# Patient Record
Sex: FEMALE | Race: BLACK OR AFRICAN AMERICAN | NOT HISPANIC OR LATINO | Employment: OTHER | URBAN - METROPOLITAN AREA
[De-identification: names, ages, dates, MRNs, and addresses within clinical notes are randomized per-mention and may not be internally consistent; named-entity substitution may affect disease eponyms.]

---

## 2017-08-03 ENCOUNTER — APPOINTMENT (OUTPATIENT)
Dept: GENERAL RADIOLOGY | Facility: HOSPITAL | Age: 67
End: 2017-08-03

## 2017-08-03 ENCOUNTER — HOSPITAL ENCOUNTER (INPATIENT)
Facility: HOSPITAL | Age: 67
LOS: 9 days | Discharge: NURSING, REHAB OR LONG-TERM CARE | End: 2017-08-12
Attending: EMERGENCY MEDICINE | Admitting: INTERNAL MEDICINE

## 2017-08-03 DIAGNOSIS — N30.00 ACUTE CYSTITIS WITHOUT HEMATURIA: ICD-10-CM

## 2017-08-03 DIAGNOSIS — D64.9 SYMPTOMATIC ANEMIA: ICD-10-CM

## 2017-08-03 DIAGNOSIS — E66.01 MORBID OBESITY DUE TO EXCESS CALORIES (HCC): ICD-10-CM

## 2017-08-03 DIAGNOSIS — N19 RENAL FAILURE: Primary | ICD-10-CM

## 2017-08-03 DIAGNOSIS — R62.7 ADULT FAILURE TO THRIVE SYNDROME: ICD-10-CM

## 2017-08-03 DIAGNOSIS — Z74.09 IMPAIRED FUNCTIONAL MOBILITY, BALANCE, GAIT, AND ENDURANCE: ICD-10-CM

## 2017-08-03 PROBLEM — I50.9 ACUTE ON CHRONIC HEART FAILURE (HCC): Status: ACTIVE | Noted: 2017-08-03

## 2017-08-03 PROBLEM — I10 HYPERTENSION: Status: ACTIVE | Noted: 2017-08-03

## 2017-08-03 PROBLEM — J45.909 ASTHMA: Status: ACTIVE | Noted: 2017-08-03

## 2017-08-03 PROBLEM — E11.9 DIABETES MELLITUS (HCC): Status: ACTIVE | Noted: 2017-08-03

## 2017-08-03 LAB
ABO GROUP BLD: NORMAL
ABO GROUP BLD: NORMAL
ALBUMIN SERPL-MCNC: 3.2 G/DL (ref 3.2–4.8)
ALBUMIN/GLOB SERPL: 0.7 G/DL (ref 1.5–2.5)
ALP SERPL-CCNC: 104 U/L (ref 25–100)
ALT SERPL W P-5'-P-CCNC: 15 U/L (ref 7–40)
ANION GAP SERPL CALCULATED.3IONS-SCNC: 7 MMOL/L (ref 3–11)
AST SERPL-CCNC: 13 U/L (ref 0–33)
BACTERIA UR QL AUTO: ABNORMAL /HPF
BASOPHILS # BLD AUTO: 0.03 10*3/MM3 (ref 0–0.2)
BASOPHILS NFR BLD AUTO: 0.3 % (ref 0–1)
BILIRUB SERPL-MCNC: 0.2 MG/DL (ref 0.3–1.2)
BILIRUB UR QL STRIP: NEGATIVE
BLD GP AB SCN SERPL QL: NEGATIVE
BNP SERPL-MCNC: 284 PG/ML (ref 0–100)
BUN BLD-MCNC: 58 MG/DL (ref 9–23)
BUN/CREAT SERPL: 15.7 (ref 7–25)
CALCIUM SPEC-SCNC: 8.9 MG/DL (ref 8.7–10.4)
CHLORIDE SERPL-SCNC: 101 MMOL/L (ref 99–109)
CLARITY UR: ABNORMAL
CO2 SERPL-SCNC: 29 MMOL/L (ref 20–31)
COLOR UR: YELLOW
CREAT BLD-MCNC: 3.7 MG/DL (ref 0.6–1.3)
CREAT UR-MCNC: 32.6 MG/DL
DEPRECATED RDW RBC AUTO: 51.3 FL (ref 37–54)
DEVELOPER EXPIRATION DATE: ABNORMAL
DEVELOPER LOT NUMBER: ABNORMAL
EOSINOPHIL # BLD AUTO: 0.27 10*3/MM3 (ref 0–0.3)
EOSINOPHIL NFR BLD AUTO: 2.6 % (ref 0–3)
ERYTHROCYTE [DISTWIDTH] IN BLOOD BY AUTOMATED COUNT: 15 % (ref 11.3–14.5)
EXPIRATION DATE: ABNORMAL
FECAL OCCULT BLOOD SCREEN, POC: POSITIVE
FERRITIN SERPL-MCNC: 299 NG/ML (ref 10–291)
GFR SERPL CREATININE-BSD FRML MDRD: 15 ML/MIN/1.73
GLOBULIN UR ELPH-MCNC: 4.6 GM/DL
GLUCOSE BLD-MCNC: 154 MG/DL (ref 70–100)
GLUCOSE UR STRIP-MCNC: NEGATIVE MG/DL
HBA1C MFR BLD: 6 % (ref 4.8–5.6)
HCT VFR BLD AUTO: 24.3 % (ref 34.5–44)
HGB BLD-MCNC: 7.3 G/DL (ref 11.5–15.5)
HGB UR QL STRIP.AUTO: ABNORMAL
HOLD SPECIMEN: NORMAL
HOLD SPECIMEN: NORMAL
HYALINE CASTS UR QL AUTO: ABNORMAL /LPF
IMM GRANULOCYTES # BLD: 0.03 10*3/MM3 (ref 0–0.03)
IMM GRANULOCYTES NFR BLD: 0.3 % (ref 0–0.6)
INR PPP: 0.95
IRON 24H UR-MRATE: 14 MCG/DL (ref 50–175)
IRON SATN MFR SERPL: 6 % (ref 15–50)
KETONES UR QL STRIP: NEGATIVE
LEUKOCYTE ESTERASE UR QL STRIP.AUTO: ABNORMAL
LYMPHOCYTES # BLD AUTO: 1.76 10*3/MM3 (ref 0.6–4.8)
LYMPHOCYTES NFR BLD AUTO: 16.7 % (ref 24–44)
Lab: ABNORMAL
MCH RBC QN AUTO: 27.8 PG (ref 27–31)
MCHC RBC AUTO-ENTMCNC: 30 G/DL (ref 32–36)
MCV RBC AUTO: 92.4 FL (ref 80–99)
MONOCYTES # BLD AUTO: 0.63 10*3/MM3 (ref 0–1)
MONOCYTES NFR BLD AUTO: 6 % (ref 0–12)
NEGATIVE CONTROL: NEGATIVE
NEUTROPHILS # BLD AUTO: 7.84 10*3/MM3 (ref 1.5–8.3)
NEUTROPHILS NFR BLD AUTO: 74.1 % (ref 41–71)
NITRITE UR QL STRIP: NEGATIVE
PH UR STRIP.AUTO: 7.5 [PH] (ref 5–8)
PLATELET # BLD AUTO: 497 10*3/MM3 (ref 150–450)
PMV BLD AUTO: 9.3 FL (ref 6–12)
POSITIVE CONTROL: POSITIVE
POTASSIUM BLD-SCNC: 4.5 MMOL/L (ref 3.5–5.5)
PROT SERPL-MCNC: 7.8 G/DL (ref 5.7–8.2)
PROT UR QL STRIP: ABNORMAL
PROTHROMBIN TIME: 10.3 SECONDS (ref 9.6–11.5)
RBC # BLD AUTO: 2.63 10*6/MM3 (ref 3.89–5.14)
RBC # UR: ABNORMAL /HPF
REF LAB TEST METHOD: ABNORMAL
RETICS/RBC NFR AUTO: 1.63 % (ref 0.5–1.5)
RH BLD: POSITIVE
RH BLD: POSITIVE
SODIUM BLD-SCNC: 137 MMOL/L (ref 132–146)
SODIUM UR-SCNC: 102 MMOL/L (ref 30–90)
SP GR UR STRIP: 1.01 (ref 1–1.03)
SQUAMOUS #/AREA URNS HPF: ABNORMAL /HPF
TIBC SERPL-MCNC: 230 MCG/DL (ref 250–450)
UROBILINOGEN UR QL STRIP: ABNORMAL
WBC NRBC COR # BLD: 10.56 10*3/MM3 (ref 3.5–10.8)
WBC UR QL AUTO: ABNORMAL /HPF
WHOLE BLOOD HOLD SPECIMEN: NORMAL
WHOLE BLOOD HOLD SPECIMEN: NORMAL

## 2017-08-03 PROCEDURE — 87186 SC STD MICRODIL/AGAR DIL: CPT | Performed by: EMERGENCY MEDICINE

## 2017-08-03 PROCEDURE — 71010 HC CHEST PA OR AP: CPT

## 2017-08-03 PROCEDURE — 85045 AUTOMATED RETICULOCYTE COUNT: CPT | Performed by: NURSE PRACTITIONER

## 2017-08-03 PROCEDURE — 84156 ASSAY OF PROTEIN URINE: CPT | Performed by: INTERNAL MEDICINE

## 2017-08-03 PROCEDURE — 81001 URINALYSIS AUTO W/SCOPE: CPT | Performed by: EMERGENCY MEDICINE

## 2017-08-03 PROCEDURE — 25010000002 HYDROMORPHONE PER 4 MG: Performed by: EMERGENCY MEDICINE

## 2017-08-03 PROCEDURE — 83880 ASSAY OF NATRIURETIC PEPTIDE: CPT | Performed by: EMERGENCY MEDICINE

## 2017-08-03 PROCEDURE — 85025 COMPLETE CBC W/AUTO DIFF WBC: CPT | Performed by: EMERGENCY MEDICINE

## 2017-08-03 PROCEDURE — 94760 N-INVAS EAR/PLS OXIMETRY 1: CPT

## 2017-08-03 PROCEDURE — 86901 BLOOD TYPING SEROLOGIC RH(D): CPT | Performed by: EMERGENCY MEDICINE

## 2017-08-03 PROCEDURE — 87086 URINE CULTURE/COLONY COUNT: CPT | Performed by: EMERGENCY MEDICINE

## 2017-08-03 PROCEDURE — 83036 HEMOGLOBIN GLYCOSYLATED A1C: CPT | Performed by: NURSE PRACTITIONER

## 2017-08-03 PROCEDURE — 87077 CULTURE AEROBIC IDENTIFY: CPT | Performed by: EMERGENCY MEDICINE

## 2017-08-03 PROCEDURE — 83550 IRON BINDING TEST: CPT | Performed by: NURSE PRACTITIONER

## 2017-08-03 PROCEDURE — 84300 ASSAY OF URINE SODIUM: CPT | Performed by: NURSE PRACTITIONER

## 2017-08-03 PROCEDURE — P9016 RBC LEUKOCYTES REDUCED: HCPCS

## 2017-08-03 PROCEDURE — 25010000002 ONDANSETRON PER 1 MG: Performed by: EMERGENCY MEDICINE

## 2017-08-03 PROCEDURE — 94640 AIRWAY INHALATION TREATMENT: CPT

## 2017-08-03 PROCEDURE — 93005 ELECTROCARDIOGRAM TRACING: CPT | Performed by: EMERGENCY MEDICINE

## 2017-08-03 PROCEDURE — 81003 URINALYSIS AUTO W/O SCOPE: CPT | Performed by: EMERGENCY MEDICINE

## 2017-08-03 PROCEDURE — 99223 1ST HOSP IP/OBS HIGH 75: CPT | Performed by: INTERNAL MEDICINE

## 2017-08-03 PROCEDURE — 83540 ASSAY OF IRON: CPT | Performed by: NURSE PRACTITIONER

## 2017-08-03 PROCEDURE — 86900 BLOOD TYPING SEROLOGIC ABO: CPT

## 2017-08-03 PROCEDURE — 86901 BLOOD TYPING SEROLOGIC RH(D): CPT

## 2017-08-03 PROCEDURE — 25010000002 CEFTRIAXONE PER 250 MG: Performed by: EMERGENCY MEDICINE

## 2017-08-03 PROCEDURE — 82728 ASSAY OF FERRITIN: CPT | Performed by: NURSE PRACTITIONER

## 2017-08-03 PROCEDURE — 85610 PROTHROMBIN TIME: CPT | Performed by: EMERGENCY MEDICINE

## 2017-08-03 PROCEDURE — 82570 ASSAY OF URINE CREATININE: CPT | Performed by: NURSE PRACTITIONER

## 2017-08-03 PROCEDURE — 86920 COMPATIBILITY TEST SPIN: CPT

## 2017-08-03 PROCEDURE — 80053 COMPREHEN METABOLIC PANEL: CPT | Performed by: EMERGENCY MEDICINE

## 2017-08-03 PROCEDURE — 99285 EMERGENCY DEPT VISIT HI MDM: CPT

## 2017-08-03 PROCEDURE — 86900 BLOOD TYPING SEROLOGIC ABO: CPT | Performed by: EMERGENCY MEDICINE

## 2017-08-03 PROCEDURE — 36415 COLL VENOUS BLD VENIPUNCTURE: CPT

## 2017-08-03 PROCEDURE — 36430 TRANSFUSION BLD/BLD COMPNT: CPT

## 2017-08-03 PROCEDURE — 86850 RBC ANTIBODY SCREEN: CPT | Performed by: EMERGENCY MEDICINE

## 2017-08-03 RX ORDER — SODIUM CHLORIDE 0.9 % (FLUSH) 0.9 %
10 SYRINGE (ML) INJECTION AS NEEDED
Status: DISCONTINUED | OUTPATIENT
Start: 2017-08-03 | End: 2017-08-12 | Stop reason: HOSPADM

## 2017-08-03 RX ORDER — TRIAMTERENE AND HYDROCHLOROTHIAZIDE 37.5; 25 MG/1; MG/1
1 TABLET ORAL DAILY
Status: ON HOLD | COMMUNITY
End: 2017-08-04

## 2017-08-03 RX ORDER — DIAZEPAM 2 MG/1
2 TABLET ORAL 2 TIMES DAILY PRN
Status: ON HOLD | COMMUNITY
End: 2017-08-04

## 2017-08-03 RX ORDER — HYDRALAZINE HYDROCHLORIDE 10 MG/1
25 TABLET, FILM COATED ORAL 3 TIMES DAILY
Status: DISCONTINUED | OUTPATIENT
Start: 2017-08-03 | End: 2017-08-08

## 2017-08-03 RX ORDER — HYDROMORPHONE HYDROCHLORIDE 1 MG/ML
0.25 INJECTION, SOLUTION INTRAMUSCULAR; INTRAVENOUS; SUBCUTANEOUS ONCE
Status: COMPLETED | OUTPATIENT
Start: 2017-08-03 | End: 2017-08-03

## 2017-08-03 RX ORDER — CLONIDINE HYDROCHLORIDE 0.1 MG/1
0.1 TABLET ORAL ONCE
Status: COMPLETED | OUTPATIENT
Start: 2017-08-03 | End: 2017-08-03

## 2017-08-03 RX ORDER — SODIUM CHLORIDE 0.9 % (FLUSH) 0.9 %
1-10 SYRINGE (ML) INJECTION AS NEEDED
Status: DISCONTINUED | OUTPATIENT
Start: 2017-08-03 | End: 2017-08-12 | Stop reason: HOSPADM

## 2017-08-03 RX ORDER — ATORVASTATIN CALCIUM 10 MG/1
10 TABLET, FILM COATED ORAL NIGHTLY
COMMUNITY
End: 2019-10-02 | Stop reason: HOSPADM

## 2017-08-03 RX ORDER — FUROSEMIDE 40 MG/1
40 TABLET ORAL DAILY
Status: DISCONTINUED | OUTPATIENT
Start: 2017-08-03 | End: 2017-08-03

## 2017-08-03 RX ORDER — BISACODYL 5 MG/1
5 TABLET, DELAYED RELEASE ORAL DAILY PRN
Status: DISCONTINUED | OUTPATIENT
Start: 2017-08-03 | End: 2017-08-12 | Stop reason: HOSPADM

## 2017-08-03 RX ORDER — ATORVASTATIN CALCIUM 10 MG/1
10 TABLET, FILM COATED ORAL DAILY
Status: DISCONTINUED | OUTPATIENT
Start: 2017-08-03 | End: 2017-08-12 | Stop reason: HOSPADM

## 2017-08-03 RX ORDER — ISOSORBIDE MONONITRATE 60 MG/1
60 TABLET, EXTENDED RELEASE ORAL DAILY
Status: DISCONTINUED | OUTPATIENT
Start: 2017-08-03 | End: 2017-08-12 | Stop reason: HOSPADM

## 2017-08-03 RX ORDER — IPRATROPIUM BROMIDE AND ALBUTEROL SULFATE 2.5; .5 MG/3ML; MG/3ML
3 SOLUTION RESPIRATORY (INHALATION) ONCE
Status: COMPLETED | OUTPATIENT
Start: 2017-08-03 | End: 2017-08-03

## 2017-08-03 RX ORDER — LISINOPRIL 40 MG/1
40 TABLET ORAL DAILY
Status: ON HOLD | COMMUNITY
End: 2017-08-04

## 2017-08-03 RX ORDER — BISACODYL 10 MG
10 SUPPOSITORY, RECTAL RECTAL DAILY PRN
Status: DISCONTINUED | OUTPATIENT
Start: 2017-08-03 | End: 2017-08-12 | Stop reason: HOSPADM

## 2017-08-03 RX ORDER — TRAMADOL HYDROCHLORIDE 50 MG/1
100 TABLET ORAL EVERY 12 HOURS PRN
COMMUNITY
End: 2017-08-12 | Stop reason: HOSPADM

## 2017-08-03 RX ORDER — HYDRALAZINE HYDROCHLORIDE 25 MG/1
25 TABLET, FILM COATED ORAL 3 TIMES DAILY
COMMUNITY
End: 2017-08-12 | Stop reason: HOSPADM

## 2017-08-03 RX ORDER — ALBUTEROL SULFATE 2.5 MG/3ML
2.5 SOLUTION RESPIRATORY (INHALATION) EVERY 4 HOURS PRN
COMMUNITY
End: 2017-08-03

## 2017-08-03 RX ORDER — AMLODIPINE BESYLATE 10 MG/1
10 TABLET ORAL DAILY
COMMUNITY
End: 2017-08-12 | Stop reason: HOSPADM

## 2017-08-03 RX ORDER — CEFTRIAXONE SODIUM 1 G/50ML
1 INJECTION, SOLUTION INTRAVENOUS ONCE
Status: COMPLETED | OUTPATIENT
Start: 2017-08-03 | End: 2017-08-03

## 2017-08-03 RX ORDER — BUDESONIDE AND FORMOTEROL FUMARATE DIHYDRATE 80; 4.5 UG/1; UG/1
2 AEROSOL RESPIRATORY (INHALATION)
Status: DISCONTINUED | OUTPATIENT
Start: 2017-08-03 | End: 2017-08-12 | Stop reason: HOSPADM

## 2017-08-03 RX ORDER — LORAZEPAM 1 MG/1
1 TABLET ORAL EVERY 6 HOURS PRN
Status: DISCONTINUED | OUTPATIENT
Start: 2017-08-03 | End: 2017-08-04

## 2017-08-03 RX ORDER — AMLODIPINE BESYLATE 5 MG/1
10 TABLET ORAL DAILY
Status: DISCONTINUED | OUTPATIENT
Start: 2017-08-03 | End: 2017-08-11

## 2017-08-03 RX ORDER — FUROSEMIDE 40 MG/1
40 TABLET ORAL 2 TIMES DAILY
Status: DISCONTINUED | OUTPATIENT
Start: 2017-08-03 | End: 2017-08-12 | Stop reason: HOSPADM

## 2017-08-03 RX ORDER — HYDROCODONE BITARTRATE AND ACETAMINOPHEN 5; 325 MG/1; MG/1
1 TABLET ORAL EVERY 4 HOURS PRN
Status: DISCONTINUED | OUTPATIENT
Start: 2017-08-03 | End: 2017-08-12 | Stop reason: HOSPADM

## 2017-08-03 RX ORDER — ALBUTEROL SULFATE 90 UG/1
2 AEROSOL, METERED RESPIRATORY (INHALATION) EVERY 4 HOURS PRN
COMMUNITY
End: 2021-02-17 | Stop reason: HOSPADM

## 2017-08-03 RX ORDER — ONDANSETRON 4 MG/1
4 TABLET, FILM COATED ORAL EVERY 6 HOURS PRN
Status: DISCONTINUED | OUTPATIENT
Start: 2017-08-03 | End: 2017-08-12 | Stop reason: HOSPADM

## 2017-08-03 RX ORDER — ONDANSETRON 2 MG/ML
4 INJECTION INTRAMUSCULAR; INTRAVENOUS ONCE
Status: COMPLETED | OUTPATIENT
Start: 2017-08-03 | End: 2017-08-03

## 2017-08-03 RX ORDER — DIAZEPAM 2 MG/1
2 TABLET ORAL 2 TIMES DAILY PRN
Status: DISCONTINUED | OUTPATIENT
Start: 2017-08-03 | End: 2017-08-10

## 2017-08-03 RX ORDER — ISOSORBIDE MONONITRATE 30 MG/1
30 TABLET, EXTENDED RELEASE ORAL DAILY
Status: DISCONTINUED | OUTPATIENT
Start: 2017-08-03 | End: 2017-08-03

## 2017-08-03 RX ORDER — ISOSORBIDE MONONITRATE 30 MG/1
30 TABLET, EXTENDED RELEASE ORAL DAILY
COMMUNITY
End: 2017-08-12 | Stop reason: HOSPADM

## 2017-08-03 RX ORDER — ONDANSETRON 2 MG/ML
4 INJECTION INTRAMUSCULAR; INTRAVENOUS EVERY 6 HOURS PRN
Status: DISCONTINUED | OUTPATIENT
Start: 2017-08-03 | End: 2017-08-12 | Stop reason: HOSPADM

## 2017-08-03 RX ORDER — ACETAMINOPHEN 325 MG/1
650 TABLET ORAL EVERY 4 HOURS PRN
Status: DISCONTINUED | OUTPATIENT
Start: 2017-08-03 | End: 2017-08-12 | Stop reason: HOSPADM

## 2017-08-03 RX ADMIN — HYDROMORPHONE HYDROCHLORIDE 0.25 MG: 1 INJECTION, SOLUTION INTRAMUSCULAR; INTRAVENOUS; SUBCUTANEOUS at 16:25

## 2017-08-03 RX ADMIN — CLONIDINE HYDROCHLORIDE 0.1 MG: 0.1 TABLET ORAL at 16:20

## 2017-08-03 RX ADMIN — AMLODIPINE BESYLATE 10 MG: 10 TABLET ORAL at 23:41

## 2017-08-03 RX ADMIN — HYDRALAZINE HYDROCHLORIDE 25 MG: 25 TABLET ORAL at 23:42

## 2017-08-03 RX ADMIN — CEFTRIAXONE SODIUM 1 G: 1 INJECTION, SOLUTION INTRAVENOUS at 16:27

## 2017-08-03 RX ADMIN — ONDANSETRON 4 MG: 2 INJECTION INTRAMUSCULAR; INTRAVENOUS at 16:22

## 2017-08-03 RX ADMIN — FUROSEMIDE 40 MG: 40 TABLET ORAL at 23:41

## 2017-08-03 RX ADMIN — ISOSORBIDE MONONITRATE 60 MG: 60 TABLET, EXTENDED RELEASE ORAL at 23:41

## 2017-08-03 RX ADMIN — HYDROCODONE BITARTRATE AND ACETAMINOPHEN 1 TABLET: 5; 325 TABLET ORAL at 23:42

## 2017-08-03 RX ADMIN — ATORVASTATIN CALCIUM 10 MG: 10 TABLET, FILM COATED ORAL at 23:41

## 2017-08-03 RX ADMIN — IPRATROPIUM BROMIDE AND ALBUTEROL SULFATE 3 ML: .5; 3 SOLUTION RESPIRATORY (INHALATION) at 16:43

## 2017-08-03 NOTE — ED PROVIDER NOTES
Subjective   HPI Comments: Joy Bueno is a 67 y.o.female from [place], [place] who was previous living independently at home. She reports she is able to go out to the store when using a buggy to get around. She is not on any home oxygen and does not use a CPAP. She does have a history of asthma and has used asthma inhalers at home for management. She presents to the ED with c/o an abnormal lab finding. She was seen at [facility] about two weeks ago with an asthma exacerbation. She was admitted to the hospital and during the admission she developed difficulty moving her left lower extremity. This was thought to have been related to arthritis. She was then discharged to a rehabilitation facility and has remained there since. So far, she has only been getting around via a wheelchair. Today she had labs drawn at the facility, showing that she is anemic. She was referred to the ED for further evaluation of this. She also c/o fluid retention and loose diarrhea x 3 days but denies bloody stools, hematuria, history of CHF, kidney disease, previous endoscopies, or any other complaints at this time.       Patient is a 67 y.o. female presenting with general illness.   History provided by:  Patient  Illness   Location:  Abnormal lab findings   Quality:  Anemia  Severity:  Moderate  Onset quality:  Gradual  Duration: Discovered today   Timing:  Constant  Progression:  Unchanged  Chronicity:  New  Context:  She has been staying at a rehab facility after being admitted to [facility] with an asthma exacerbation and arthritis. Today she had labs drawn at the facility, showing that she is anemic. She was referred to the ED for further evaluation of this.  Relieved by:  None tried  Worsened by:  Nothing  Ineffective treatments:  None tried  Associated symptoms: diarrhea    Diarrhea:     Quality:  Watery    Severity:  Moderate    Duration:  3 days    Timing:  Intermittent    Progression:  Unchanged      Review of  Systems   Constitutional:        Anemia per abnormal lab findings.    Cardiovascular: Positive for leg swelling (fluid retention ).   Gastrointestinal: Positive for diarrhea. Negative for anal bleeding and blood in stool.   Genitourinary: Negative for hematuria.   All other systems reviewed and are negative.      Past Medical History:   Diagnosis Date   • Anemia    • Asthma    • Diabetes mellitus    • Hypertension      1:28 PM  She has no records of old visits here at [facility].   She was recently seen over at .     No Known Allergies    Past Surgical History:   Procedure Laterality Date   •  SECTION     • HERNIA REPAIR         History reviewed. No pertinent family history.    Social History     Social History   • Marital status:      Spouse name: N/A   • Number of children: N/A   • Years of education: N/A     Social History Main Topics   • Smoking status: Former Smoker   • Smokeless tobacco: None   • Alcohol use No   • Drug use: No   • Sexual activity: Not Asked     Other Topics Concern   • None     Social History Narrative    lIVES ALONE- CURRENTLY IN REHAB FOR ARTHRITIS         Objective   Physical Exam   Constitutional: She is oriented to person, place, and time. She appears well-developed and well-nourished. No distress.   Hirsut       HENT:   Nose: Nose normal.   Mouth/Throat: Oropharynx is clear and moist.   Periorbital edema.    Eyes: Conjunctivae and EOM are normal. Pupils are equal, round, and reactive to light. No scleral icterus.   Neck: Normal range of motion. Neck supple.   No carotid bruits.    Cardiovascular: Regular rhythm, normal heart sounds and intact distal pulses.  Tachycardia present.    No murmur heard.  Pulmonary/Chest: Effort normal. No respiratory distress. She has wheezes (Scattered bilateral wheezes ).   Abdominal: Soft. Bowel sounds are normal. She exhibits no mass. There is no tenderness. There is no rebound and no guarding.   Super morbidly obese.    Genitourinary:  Rectal exam shows guaiac positive stool (per the nurses rectal exam ).   Musculoskeletal: Normal range of motion. She exhibits edema.   Couple freckles on the palm of her right hand, old per the patient. Good capillary refill. Brony anasarca from her waist down to her toes. She has sore ankles with mild synovitis that is greater on the left than the right. The soles of her feet are covered in petechiae or purpura in what appears to be the dermal layer. 3/4 pulses.   Lymphadenopathy:     She has no cervical adenopathy.   Neurological: She is alert and oriented to person, place, and time.   Moderate global weakness. Speech, hearing, and vision preserved, Tongue midline.    Skin: Skin is warm and dry.   Psychiatric: She has a normal mood and affect. Her behavior is normal.   Nursing note and vitals reviewed.      Critical Care  Performed by: EDGAR MALAVE  Authorized by: EDGAR MALAVE   Total critical care time: 35 minutes  Critical care was necessary to treat or prevent imminent or life-threatening deterioration of the following conditions: renal failure (sypmtomatic anemia).  Critical care was time spent personally by me on the following activities: obtaining history from patient or surrogate, pulse oximetry, development of treatment plan with patient or surrogate, ordering and performing treatments and interventions, re-evaluation of patient's condition, review of old charts, ordering and review of laboratory studies, evaluation of patient's response to treatment, discussions with consultants, examination of patient and ordering and review of radiographic studies.               ED Course  ED Course     Recent Results (from the past 24 hour(s))   Comprehensive Metabolic Panel    Collection Time: 08/03/17  1:38 PM   Result Value Ref Range    Glucose 154 (H) 70 - 100 mg/dL    BUN 58 (H) 9 - 23 mg/dL    Creatinine 3.70 (H) 0.60 - 1.30 mg/dL    Sodium 137 132 - 146 mmol/L    Potassium 4.5 3.5 - 5.5 mmol/L     Chloride 101 99 - 109 mmol/L    CO2 29.0 20.0 - 31.0 mmol/L    Calcium 8.9 8.7 - 10.4 mg/dL    Total Protein 7.8 5.7 - 8.2 g/dL    Albumin 3.20 3.20 - 4.80 g/dL    ALT (SGPT) 15 7 - 40 U/L    AST (SGOT) 13 0 - 33 U/L    Alkaline Phosphatase 104 (H) 25 - 100 U/L    Total Bilirubin 0.2 (L) 0.3 - 1.2 mg/dL    eGFR  African Amer 15 (L) >60 mL/min/1.73    Globulin 4.6 gm/dL    A/G Ratio 0.7 (L) 1.5 - 2.5 g/dL    BUN/Creatinine Ratio 15.7 7.0 - 25.0    Anion Gap 7.0 3.0 - 11.0 mmol/L   Protime-INR    Collection Time: 08/03/17  1:38 PM   Result Value Ref Range    Protime 10.3 9.6 - 11.5 Seconds    INR 0.95    BNP    Collection Time: 08/03/17  1:38 PM   Result Value Ref Range    .0 (H) 0.0 - 100.0 pg/mL   CBC Auto Differential    Collection Time: 08/03/17  1:38 PM   Result Value Ref Range    WBC 10.56 3.50 - 10.80 10*3/mm3    RBC 2.63 (L) 3.89 - 5.14 10*6/mm3    Hemoglobin 7.3 (L) 11.5 - 15.5 g/dL    Hematocrit 24.3 (L) 34.5 - 44.0 %    MCV 92.4 80.0 - 99.0 fL    MCH 27.8 27.0 - 31.0 pg    MCHC 30.0 (L) 32.0 - 36.0 g/dL    RDW 15.0 (H) 11.3 - 14.5 %    RDW-SD 51.3 37.0 - 54.0 fl    MPV 9.3 6.0 - 12.0 fL    Platelets 497 (H) 150 - 450 10*3/mm3    Neutrophil % 74.1 (H) 41.0 - 71.0 %    Lymphocyte % 16.7 (L) 24.0 - 44.0 %    Monocyte % 6.0 0.0 - 12.0 %    Eosinophil % 2.6 0.0 - 3.0 %    Basophil % 0.3 0.0 - 1.0 %    Immature Grans % 0.3 0.0 - 0.6 %    Neutrophils, Absolute 7.84 1.50 - 8.30 10*3/mm3    Lymphocytes, Absolute 1.76 0.60 - 4.80 10*3/mm3    Monocytes, Absolute 0.63 0.00 - 1.00 10*3/mm3    Eosinophils, Absolute 0.27 0.00 - 0.30 10*3/mm3    Basophils, Absolute 0.03 0.00 - 0.20 10*3/mm3    Immature Grans, Absolute 0.03 0.00 - 0.03 10*3/mm3   Light Blue Top    Collection Time: 08/03/17  1:38 PM   Result Value Ref Range    Extra Tube hold for add-on    Green Top (Gel)    Collection Time: 08/03/17  1:38 PM   Result Value Ref Range    Extra Tube Hold for add-ons.    Lavender Top    Collection Time: 08/03/17  1:38 PM    Result Value Ref Range    Extra Tube hold for add-on    Gold Top - SST    Collection Time: 08/03/17  1:38 PM   Result Value Ref Range    Extra Tube Hold for add-ons.    Type & Screen    Collection Time: 08/03/17  2:02 PM   Result Value Ref Range    ABO Type B     RH type Positive     Antibody Screen Negative    Urinalysis With / Culture If Indicated    Collection Time: 08/03/17  2:30 PM   Result Value Ref Range    Color, UA Yellow Yellow, Straw    Appearance, UA Cloudy (A) Clear    pH, UA 7.5 5.0 - 8.0    Specific Gravity, UA 1.010 1.001 - 1.030    Glucose, UA Negative Negative    Ketones, UA Negative Negative    Bilirubin, UA Negative Negative    Blood, UA Trace (A) Negative    Protein,  mg/dL (2+) (A) Negative    Leuk Esterase, UA Large (3+) (A) Negative    Nitrite, UA Negative Negative    Urobilinogen, UA 0.2 E.U./dL 0.2 - 1.0 E.U./dL   Urinalysis, Microscopic Only    Collection Time: 08/03/17  2:30 PM   Result Value Ref Range    RBC, UA 0-2 None Seen, 0-2 /HPF    WBC, UA Too Numerous to Count (A) None Seen /HPF    Bacteria, UA None Seen None Seen, Trace /HPF    Squamous Epithelial Cells, UA None Seen None Seen, 0-2 /HPF    Hyaline Casts, UA 0-6 0 - 6 /LPF    Methodology Manual Light Microscopy    POCT Occult Blood, stool    Collection Time: 08/03/17  2:37 PM   Result Value Ref Range    Fecal Occult Blood Positive (A) Negative    Lot Number [id number]     Expiration Date 3-20     DEVELOPER LOT NUMBER [id number]     DEVELOPER EXPIRATION DATE 2020-5     Positive Control Positive Positive    Negative Control Negative Negative     Note: In addition to lab results from this visit, the labs listed above may include labs taken at another facility or during a different encounter within the last 24 hours. Please correlate lab times with ED admission and discharge times for further clarification of the services performed during this visit.    XR Chest 1 View    (Results Pending)     Vitals:    08/03/17 1530 08/03/17 1531  "08/03/17 1600 08/03/17 1601   BP: 174/84  152/69    Pulse:  99  94   Resp:       Temp:       TempSrc:       SpO2:  96%  97%   Weight:       Height:         Medications   sodium chloride 0.9 % flush 10 mL (not administered)   cefTRIAXone (ROCEPHIN) IVPB 1 g (1 g Intravenous New Bag 8/3/17 1627)   ipratropium-albuterol (DUO-NEB) nebulizer solution 3 mL (not administered)   CloNIDine (CATAPRES) tablet 0.1 mg (0.1 mg Oral Given 8/3/17 1620)   HYDROmorphone (DILAUDID) injection 0.25 mg (0.25 mg Intravenous Given 8/3/17 1625)   ondansetron (ZOFRAN) injection 4 mg (4 mg Intravenous Given 8/3/17 1622)     ECG/EMG Results (last 24 hours)     Procedure Component Value Units Date/Time    ECG 12 Lead [[id number]] Collected:  08/03/17 1353     Updated:  08/03/17 1402                          MDM  Number of Diagnoses or Management Options  Acute cystitis without hematuria:   Adult failure to thrive syndrome:   Morbid obesity due to excess calories:   Renal failure:   Symptomatic anemia:   Diagnosis management comments:       I reviewed all available studies at the bedside with the patient and her brother.  This is an unfortunate soul who up until July apparently was living independently and able to get the store she wrote a buggy and able to get around room [place] the house but then she had a \"asthma attack\" is really declined since.  Now at her rehabilitation facility they use a Latisha lift and move her and she has not been able to walk.    She presents today to our institution with declining hemoglobin and apparently worsening renal function.  She also has guaiac-positive stool but is not had a karolyn GI bleed.  She never remembers getting upper or lower endoscopy.  Her anemia is probably multifactorial.    This point I think the best course of action is to gently transfuse the patient 1 unit of packed cells admit her for serial hemoglobins.  She may also need involvement regarding her renal failure.  I did discuss with her the " underlying fact that her obesity is severe and losing weight going forward would really help improve her prognosis long-term and she understands this.    I spoke Dr. Abel, on-call hospital medicine, and he'll admit the patient.    All are agreeable with the plan       Amount and/or Complexity of Data Reviewed  Clinical lab tests: reviewed  Tests in the radiology section of CPT®: reviewed  Tests in the medicine section of CPT®: reviewed        Final diagnoses:   Renal failure   Symptomatic anemia   Acute cystitis without hematuria   Morbid obesity due to excess calories   Adult failure to thrive syndrome   EMR Dragon/Transcription disclaimer:   Much of this encounter note is an electronic transcription/translation of spoken language to printed text. The electronic translation of spoken language may permit erroneous, or at times, nonsensical words or phrases to be inadvertently transcribed; Although I have reviewed the note for such errors, some may still exist.       Documentation assistance provided by abelardo Foster.  Information recorded by the alliee was done at my direction and has been verified and validated by me.     Aleshia Foster  08/03/17 1534       Aleshia Foster  08/03/17 1539       Micheal Guzman MD  08/03/17 2242

## 2017-08-03 NOTE — H&P
"    [facility]  HISTORY AND PHYSICAL    Primary Care Physician: Trevon Delarosa MD    Subjective     Chief Complaint:  Low blood count and diarrhea    History of Present Illness:     Mrs. Bueno is a 67 year old morbidly obese female with a history of hypertension, diabetes, osteoarthritis, asthma and anxiety.  She presented to [facility] ED today from her rehab facility with complaints of abnormal labs.  The patient has recently been admitted at [facility] and treated for an asthma exacerbation.  At that time, she was told that her kidney function labs were elevated.  She states that she thinks she saw a kidney doctor during her hospitalization, but isn't sure.  She has never had a history of kidney disease in the past.  She was discharged from [facility] and sent to rehab (she is unsure which one), where she has been for approximately 2 weeks.  The patient states that she was trying really hard with therapy and was just getting strong enough to start taking a couple of steps when she began having diarrhea about 4 days ago.  She was unable to see the stool and didn't know what color it was.  She doesn't think she has been on any antibiotics recently.  She states that she has had cramping with her diarrhea.  She has never had any type of endoscopic procedure and doesn't want \"unless it is absolutely necessary\".  In the ED, she was noted to have a creatinine of 3.2 and a hemoglobin/hematocrit of 7.3/24.3.  She was referred for admission to hospital medicine services for evaluation of her symptomatic anemia.      Review of Systems   Constitutional: Positive for fatigue. Negative for chills and fever.   Eyes: Negative.    Respiratory: Positive for shortness of breath and wheezing (occasional \"when i get panicked\").    Cardiovascular: Positive for leg swelling. Negative for chest pain and palpitations.   Gastrointestinal: Positive for abdominal pain, blood in stool " "(unsure) and diarrhea. Negative for nausea and vomiting.   Endocrine: Negative for polyuria.   Genitourinary: Negative for dysuria, frequency, hematuria, pelvic pain, urgency and vaginal bleeding.   Neurological: Positive for weakness (generalized) and headaches (this morning). Negative for syncope.   Psychiatric/Behavioral: The patient is nervous/anxious.       Otherwise complete ROS performed and negative except as mentioned in the HPI.    Past Medical History:   Diagnosis Date   • Anemia    • Anxiety    • Asthma    • Diabetes mellitus    • Hypertension    • Morbid obesity    • Osteoarthritis        Past Surgical History:   Procedure Laterality Date   •  SECTION     • HERNIA REPAIR         History reviewed. No pertinent family history.    Social History     Social History   • Marital status:      Spouse name: N/A   • Number of children: N/A   • Years of education: N/A     Occupational History   • Not on file.     Social History Main Topics   • Smoking status: Former Smoker   • Smokeless tobacco: Not on file   • Alcohol use No   • Drug use: No   • Sexual activity: Not on file     Other Topics Concern   • Not on file     Social History Narrative    lIVES ALONE- CURRENTLY IN REHAB FOR ARTHRITIS       Medications:    (Not in a hospital admission)    Allergies:  No Known Allergies      Objective     Physical Exam:  Vital Signs: /73  Pulse 92  Temp 99.1 °F (37.3 °C) (Oral)   Resp 20  Ht 63\" (160 cm)  Wt (!) 385 lb (175 kg)  SpO2 100%  BMI 68.2 kg/m2  Physical Exam    General: Resting on stretcher, chronically ill, no family at bedside.    HEENT: Normocephalic, mucous membranes dry, pupils equal  Neck: Supple, trachea midline  Cardiovascular: tachycardic, S1-S2, no murmur  Respiratory: Clear to auscultation bilaterally, decreased in bases, even unlabored breathing  Abdomen: Soft, nontender, nondistended, bowel sounds +  Skin: Clean, dry, intact, no lesions or sores  Extremities: SUTTON, " Symmetrical, pulses +, no edema noted  Neuro: Alert, oriented ×4, appropriate and cooperative,  and pedal pushes equal, sensation intact      Results Reviewed:    Results from last 7 days  Lab Units 08/03/17  1338   WBC 10*3/mm3 10.56   HEMOGLOBIN g/dL 7.3*   PLATELETS 10*3/mm3 497*       Results from last 7 days  Lab Units 08/03/17  1338   SODIUM mmol/L 137   POTASSIUM mmol/L 4.5   CO2 mmol/L 29.0   CREATININE mg/dL 3.70*   GLUCOSE mg/dL 154*   CALCIUM mg/dL 8.9       I have personally reviewed and interpreted available lab data, radiology studies and ECG obtained at time of admission.     Assessment / Plan     Problem List:   Hospital Problem List     * (Principal)Symptomatic anemia    Hypertension    Renal failure    Overview Signed 8/3/2017  4:58 PM by SUELLEN Richey     Unclear whether this is a chronic problem.           Asthma    Diabetes mellitus    Morbid obesity    Anxiety          Assessment/Plan:      Symptomatic Anemia-  -Type and Cross and transfuse 1 units PRBCs  - Anemia labs, including ferritin, iron, TIBC, retic count.  - AM labs    Renal Failure, unclear chronicity-  - Nephrology consult  - urine studies  - Renal function panel in am.   - hold nephrotoxic agents    Hypertension-  -patient takes at least 6 antihypertensives currently.   -Hold lisinopril and triamterene/hctz  -continue norvasc, hydralazine, isosorbide  -monitor    Asthma-  -PRN albuterol nebs     Diabetes Mellitus-  -hold home metformin  -hgb a1c  -basal insulin with correction insulin ACHS    Morbid obesity with debility-  -PT, OT consult  -will likely need to go back to rehab.    Anxiety-  -continue home valium  -PRN ativan      DVT prophylaxis:  TEDS/SCDS  Code Status:  Conditional Code  Admission Status: Patient will be admitted to INPATIENT status due to the need for care which can only be reasonably provided in an hospital setting such as aggressive/expedited ancillary services and/or consultation services and the  necessity for IV medications, close physician monitoring and/or the possible need for procedures.  In such, I feel patient’s risk for adverse outcomes and need for care warrant INPATIENT evaluation and predict the patient’s care encounter to likely last beyond 2 midnights.       SUELLEN Richey 08/03/17 5:53 PM      Brief Attending Note       I have seen and examined the patient, performing an independent face-to-face diagnostic evaluation with plan of care reviewed and developed with the advanced practice clinician (APC).      Brief Summary Statement/HPI:   67 year old morbidly obese female sent from her nursing facility after she was found to be anemic there. She had a complicated stay at [facility] several weeks ago at which time she was diagnosed with CKD and told to get a renal biopsy at some point however she never did. She was staying at a nursing facility and apparently doing well until her h/h was checked today and found to be decreased from her last known baseline. She denies blood loss.    Attending Physical Exam:  Gen-no acute distress, chronically ill, morbidly obese  HEENT-atraumatic, normocephalic, PERRLA, EOMI, moist mucous membranes present  CV-RRR, S1 S2 normal, no m/r/g  Resp-CTAB, no wheezes or rales; normal respiratory effort  Abd-soft, NT, ND, +BS; no rebound or guarding  Ext-large extremities  Skin-warm, dry, no rashes or lesions noted  Neuro-A&Ox3, CN II-XII grossly intact; equal strength in upper and lower extremities; no focal deficits  Psych-appropriate mood and behavior    CXR personally reviewed with cardiomegaly. Agree with interpretation.    Brief Assessment/Plan :    66 y/o female admitted from SNF due to symptomatic anemia found to also have kidney disease and mildly decompensated heart failure.    --The patient is very complex and chronically ill given her propensity to not seek medical care. It is likely from the information I have available at this point that her renal  function is at baseline and that she likely has chronic kidney disease from HTN and DM2. Will ask NAL to see patient in am and have ordered urine studies. Have asked nursing to obtain records from [facility] to see what else may have been performed there.   --She has mildly decompensated heart failure, likely diastolic. Will again order records from [facility] to see if echo was ordered there. Will stop HCTZ and start PO lasix. Increase BP control.  --Her anemia is likely multifactorial due to her renal disease, chronic diseases, and ? Blood loss. Iron studies have been ordered and patient is receiving 1 unit of blood in ED. She will likely need oral iron upon d/c and perhaps epo as well. She adamantly refuses scope at this time but she will consider it as she has never had screening colonoscopy in past.    See above for further detailed assessment and plan developed with APC which I have reviewed and/or edited.    I believe this patient meets INPATIENT status due to the need for care which can only be reasonably provided in an hospital setting such as aggressive/expedited ancillary services and/or consultation services and the necessity for IV medications, close physician monitoring and/or the possible need for procedures.  In such, I feel patient’s risk for adverse outcomes and need for care warrant INPATIENT evaluation and predict the patient’s care encounter to likely last beyond 2 midnights.      Natali Abel II, DO  08/03/17  7:19 PM

## 2017-08-04 ENCOUNTER — APPOINTMENT (OUTPATIENT)
Dept: ULTRASOUND IMAGING | Facility: HOSPITAL | Age: 67
End: 2017-08-04

## 2017-08-04 LAB
ABO + RH BLD: NORMAL
BH BB BLOOD EXPIRATION DATE: NORMAL
BH BB BLOOD TYPE BARCODE: 7300
BH BB DISPENSE STATUS: NORMAL
BH BB PRODUCT CODE: NORMAL
BH BB UNIT NUMBER: NORMAL
CROSSMATCH INTERPRETATION: NORMAL
GLUCOSE BLDC GLUCOMTR-MCNC: 141 MG/DL (ref 70–130)
GLUCOSE BLDC GLUCOMTR-MCNC: 164 MG/DL (ref 70–130)
PROT UR-MCNC: 151 MG/DL (ref 1–14)
UNIT  ABO: NORMAL
UNIT  RH: NORMAL

## 2017-08-04 PROCEDURE — 82962 GLUCOSE BLOOD TEST: CPT

## 2017-08-04 PROCEDURE — 97162 PT EVAL MOD COMPLEX 30 MIN: CPT

## 2017-08-04 PROCEDURE — 99233 SBSQ HOSP IP/OBS HIGH 50: CPT | Performed by: INTERNAL MEDICINE

## 2017-08-04 PROCEDURE — 97166 OT EVAL MOD COMPLEX 45 MIN: CPT

## 2017-08-04 PROCEDURE — 94640 AIRWAY INHALATION TREATMENT: CPT

## 2017-08-04 PROCEDURE — 25010000002 CEFTRIAXONE PER 250 MG: Performed by: INTERNAL MEDICINE

## 2017-08-04 PROCEDURE — 94799 UNLISTED PULMONARY SVC/PX: CPT

## 2017-08-04 PROCEDURE — 63710000001 INSULIN LISPRO (HUMAN) PER 5 UNITS: Performed by: INTERNAL MEDICINE

## 2017-08-04 PROCEDURE — 25010000002 NA FERRIC GLUC CPLX PER 12.5 MG: Performed by: INTERNAL MEDICINE

## 2017-08-04 PROCEDURE — 76775 US EXAM ABDO BACK WALL LIM: CPT

## 2017-08-04 PROCEDURE — 97530 THERAPEUTIC ACTIVITIES: CPT

## 2017-08-04 PROCEDURE — 94760 N-INVAS EAR/PLS OXIMETRY 1: CPT

## 2017-08-04 RX ORDER — ACETAMINOPHEN 325 MG/1
650 TABLET ORAL EVERY 6 HOURS PRN
COMMUNITY
End: 2021-02-17 | Stop reason: HOSPADM

## 2017-08-04 RX ORDER — DEXTROSE MONOHYDRATE 25 G/50ML
25 INJECTION, SOLUTION INTRAVENOUS
Status: DISCONTINUED | OUTPATIENT
Start: 2017-08-04 | End: 2017-08-12 | Stop reason: HOSPADM

## 2017-08-04 RX ORDER — DIAZEPAM 2 MG/1
1 TABLET ORAL 3 TIMES DAILY PRN
COMMUNITY
End: 2017-08-12 | Stop reason: HOSPADM

## 2017-08-04 RX ORDER — SIMETHICONE 80 MG
80 TABLET,CHEWABLE ORAL EVERY 6 HOURS
COMMUNITY
End: 2019-04-30 | Stop reason: HOSPADM

## 2017-08-04 RX ORDER — MAGNESIUM HYDROXIDE/ALUMINUM HYDROXICE/SIMETHICONE 120; 1200; 1200 MG/30ML; MG/30ML; MG/30ML
30 SUSPENSION ORAL AS NEEDED
COMMUNITY
End: 2017-09-07 | Stop reason: HOSPADM

## 2017-08-04 RX ORDER — CEFTRIAXONE SODIUM 1 G/50ML
1 INJECTION, SOLUTION INTRAVENOUS EVERY 24 HOURS
Status: COMPLETED | OUTPATIENT
Start: 2017-08-04 | End: 2017-08-09

## 2017-08-04 RX ORDER — POLYETHYLENE GLYCOL 3350 17 G/17G
17 POWDER, FOR SOLUTION ORAL 2 TIMES DAILY
COMMUNITY
End: 2017-10-12 | Stop reason: HOSPADM

## 2017-08-04 RX ORDER — DOCUSATE SODIUM 100 MG/1
100 CAPSULE, LIQUID FILLED ORAL DAILY
COMMUNITY
End: 2017-10-12 | Stop reason: HOSPADM

## 2017-08-04 RX ORDER — INSULIN GLARGINE 100 [IU]/ML
15 INJECTION, SOLUTION SUBCUTANEOUS NIGHTLY
COMMUNITY
End: 2017-08-12 | Stop reason: HOSPADM

## 2017-08-04 RX ORDER — NICOTINE POLACRILEX 4 MG
15 LOZENGE BUCCAL
Status: DISCONTINUED | OUTPATIENT
Start: 2017-08-04 | End: 2017-08-12 | Stop reason: HOSPADM

## 2017-08-04 RX ORDER — FUROSEMIDE 40 MG/1
40 TABLET ORAL 2 TIMES DAILY
Status: ON HOLD | COMMUNITY
End: 2018-08-06

## 2017-08-04 RX ADMIN — ALBUTEROL SULFATE 2.5 MG: 2.5 SOLUTION RESPIRATORY (INHALATION) at 09:21

## 2017-08-04 RX ADMIN — HYDROCODONE BITARTRATE AND ACETAMINOPHEN 1 TABLET: 5; 325 TABLET ORAL at 13:48

## 2017-08-04 RX ADMIN — HYDRALAZINE HYDROCHLORIDE 25 MG: 25 TABLET ORAL at 05:43

## 2017-08-04 RX ADMIN — SODIUM CHLORIDE 125 MG: 9 INJECTION, SOLUTION INTRAVENOUS at 14:31

## 2017-08-04 RX ADMIN — ACETAMINOPHEN 650 MG: 325 TABLET, FILM COATED ORAL at 01:32

## 2017-08-04 RX ADMIN — CEFTRIAXONE SODIUM 1 G: 1 INJECTION, SOLUTION INTRAVENOUS at 14:30

## 2017-08-04 RX ADMIN — HYDROCODONE BITARTRATE AND ACETAMINOPHEN 1 TABLET: 5; 325 TABLET ORAL at 10:03

## 2017-08-04 RX ADMIN — INSULIN LISPRO 2 UNITS: 100 INJECTION, SOLUTION INTRAVENOUS; SUBCUTANEOUS at 18:14

## 2017-08-04 RX ADMIN — HYDRALAZINE HYDROCHLORIDE 25 MG: 25 TABLET ORAL at 13:48

## 2017-08-04 RX ADMIN — BUDESONIDE AND FORMOTEROL FUMARATE DIHYDRATE 2 PUFF: 80; 4.5 AEROSOL RESPIRATORY (INHALATION) at 18:56

## 2017-08-04 RX ADMIN — ALBUTEROL SULFATE 2.5 MG: 2.5 SOLUTION RESPIRATORY (INHALATION) at 18:56

## 2017-08-04 RX ADMIN — FUROSEMIDE 40 MG: 40 TABLET ORAL at 08:41

## 2017-08-04 RX ADMIN — BUDESONIDE AND FORMOTEROL FUMARATE DIHYDRATE 2 PUFF: 80; 4.5 AEROSOL RESPIRATORY (INHALATION) at 08:13

## 2017-08-04 RX ADMIN — HYDROCODONE BITARTRATE AND ACETAMINOPHEN 1 TABLET: 5; 325 TABLET ORAL at 22:07

## 2017-08-04 RX ADMIN — HYDRALAZINE HYDROCHLORIDE 25 MG: 25 TABLET ORAL at 21:54

## 2017-08-04 RX ADMIN — DIAZEPAM 2 MG: 2 TABLET ORAL at 13:11

## 2017-08-04 RX ADMIN — HYDROCODONE BITARTRATE AND ACETAMINOPHEN 1 TABLET: 5; 325 TABLET ORAL at 05:43

## 2017-08-04 RX ADMIN — FUROSEMIDE 40 MG: 40 TABLET ORAL at 18:14

## 2017-08-04 NOTE — PLAN OF CARE
Problem: Patient Care Overview (Adult)  Goal: Plan of Care Review  Outcome: Ongoing (interventions implemented as appropriate)    08/04/17 1059   Coping/Psychosocial Response Interventions   Plan Of Care Reviewed With patient   Outcome Evaluation   Outcome Summary/Follow up Plan Pt declining OOB/EOB activity d/t LE pain. Pt completed 1 set x 10 BUE AROM. Pt with mild confusion, decreased strength, and decline in ADL performance. Pt will benefit from OT to advance pt toward PLOF with self care. Recommend SNF for rehab upon d/c.          Problem: Inpatient Occupational Therapy  Goal: Bed Mobility Goal LTG- OT  Outcome: Ongoing (interventions implemented as appropriate)    08/04/17 1059   Bed Mobility OT LTG   Bed Mobility OT LTG, Date Established 08/04/17   Bed Mobility OT LTG, Time to Achieve by discharge   Bed Mobility OT LTG, Activity Type supine to sit/sit to supine   Bed Mobility OT LTG, [place] Level moderate assist (50% patient effort);2 person assist required   Bed Mobility OT LTG, Assist Device bed rails       Goal: Transfer Training Goal 1 LTG- OT  Outcome: Ongoing (interventions implemented as appropriate)    08/04/17 1059   Transfer Training OT LTG   Transfer Training OT LTG, Date Established 08/04/17   Transfer Training OT LTG, Time to Achieve by discharge   Transfer Training OT LTG, Activity Type sit to stand/stand to sit;bed to chair /chair to bed;toilet   Transfer Training OT LTG, [place] Level moderate assist (50% patient effort);2 person assist required   Transfer Training OT LTG, Assist Device walker, rolling       Goal: Strength Goal LTG- OT  Outcome: Ongoing (interventions implemented as appropriate)    08/04/17 1059   Strength OT LTG   Strength Goal OT LTG, Date Established 08/04/17   Strength Goal OT LTG, Time to Achieve by discharge   Strength Goal OT LTG, Measure to Achieve Pt will complete 2 sets x 15 BUE AROM as needed to support ADLs       Goal: Dynamic Sitting Balance Goal LTG-  OT  Outcome: Ongoing (interventions implemented as appropriate)    08/04/17 1059   Dynamic Sitting Balance OT LTG   Dynamic Sitting Balance OT LTG, Date Established 08/04/17   Dynamic Sitting Balance OT LTG, Time to Achieve by discharge   Dynamic Sitting Balance OT LTG, Pepin Level contact guard assist   Dynamic Sitting Balance OT LTG, Assist Device UE Support       Goal: Grooming Goal LTG- OT  Outcome: Ongoing (interventions implemented as appropriate)    08/04/17 1059   Grooming OT LTG   Grooming Goal OT LTG, Date Established 08/04/17   Grooming Goal OT LTG, Time to Achieve by discharge   Grooming Goal OT LTG, Activity Type Pt will wash face and brush teeth after setup   Grooming Goal OT LTG, Pepin Level set up required

## 2017-08-04 NOTE — PLAN OF CARE
Problem: Patient Care Overview (Adult)  Goal: Plan of Care Review  Outcome: Ongoing (interventions implemented as appropriate)    08/04/17 1103   Coping/Psychosocial Response Interventions   Plan Of Care Reviewed With patient   Outcome Evaluation   Outcome Summary/Follow up Plan Pt declines mobility this date, allowing for bed level assessment only. She presents with confusion, decreased strength and ROM with Pain in BLE L>R. Pt would benefit from SNF level care upon d.c.         Problem: Inpatient Physical Therapy  Goal: Bed Mobility Goal LTG- PT  Outcome: Ongoing (interventions implemented as appropriate)    08/04/17 1103   Bed Mobility PT LTG   Bed Mobility PT LTG, Date Established 08/04/17   Bed Mobility PT LTG, Time to Achieve 2 wks   Bed Mobility PT LTG, Activity Type supine to sit/sit to supine   Bed Mobility PT LTG, Hand Level moderate assist (50% patient effort);2 person assist required       Goal: Transfer Training Goal 1 LTG- PT  Outcome: Ongoing (interventions implemented as appropriate)    08/04/17 1103   Transfer Training PT LTG   Transfer Training PT LTG, Date Established 08/04/17   Transfer Training PT LTG, Time to Achieve 2 wks   Transfer Training PT LTG, Activity Type sit to stand/stand to sit   Transfer Training PT LTG, Hand Level moderate assist (50% patient effort);2 person assist required   Transfer Training PT LTG, Assist Device walker, rolling       Goal: Gait Training Goal LTG- PT  Outcome: Ongoing (interventions implemented as appropriate)    08/04/17 1103   Gait Training PT LTG   Gait Training Goal PT LTG, Date Established 08/04/17   Gait Training Goal PT LTG, Time to Achieve 2 wks   Gait Training Goal PT LTG, Hand Level moderate assist (50% patient effort);2 person assist required   Gait Training Goal PT LTG, Assist Device walker, rolling   Gait Training Goal PT LTG, Distance to Achieve 10

## 2017-08-04 NOTE — PROGRESS NOTES
Discharge Planning Assessment  [facility]     Patient Name: Joy Bueno  MRN: [id number]  Today's Date: 8/4/2017    Admit Date: 8/3/2017          Discharge Needs Assessment       08/04/17 1017    Living Environment    Lives With alone    Living Arrangements house    Transportation Available car;van, wheelchair accessible    Living Environment Comment Pt. lives alone in her home in [place].  Pt. stated she typically drives herself in her personal vehicle.    Living Environment    Provides Primary Care For no one    Quality Of Family Relationships supportive    Able to Return to Prior Living Arrangements yes    Living Arrangement Comments Pt. wishes to return to rehab at [facility], and then ultimately wants to return to her home.    Discharge Needs Assessment    Readmission Within The Last 30 Days no previous admission in last 30 days;other (see comments)   Pt. was at [facility] 2 weeks ago    Equipment Currently Used at Home walker, standard    Discharge Facility/Level Of Care Needs nursing facility, skilled    Current Discharge Risk physical impairment    Discharge Disposition skilled nursing facility    Discharge Contact Information if Applicable [phone]    Discharge Planning Comments Pt. was transferred to [facility] after being at [facility] for approximately 2 weeks.  Pt. plans to return to rehab when medically ready for discharge.  Pt. may need help with transportation, but this will need to be clarified closer to discharge date.  Pt. did not have home health services and has a walker at her home, but stated she doesn't use it.            Discharge Plan       08/04/17 1026    Case Management/Social Work Plan    Plan Initial Discharge Planning    Patient/Family In Agreement With Plan yes    Additional Comments CM met with pt. at bedside.  Pt. seemed anxious and stated she felt like she had just had a panic attack.  Pt. will return to rehab at the time of discharge.   CM will follow up with [facility] regarding pt's ability to return for rehab services.  CM will continue to follow for discharge planning.        Discharge Placement     No information found                Demographic Summary       08/04/17 1011    Referral Information    Admission Type inpatient    Referral Source admission list    Record Reviewed other (see comments)    Referral Information Comments Return to rehab    Contact Information    Permission Granted to Share Information With facility     Comments Tawanna Dorado [phone]    Primary Care Physician Information    Name Was seen by Trevon Delarosa in [place], but recently transfered offices.  Pt. cannot recall physician's name.            Functional Status       08/04/17 1016    Employment/Financial    Employment/Finance Comments Pt. has insurance and prescription coverage with Medicare and [employer].  She uses [facility] pharmacy.            Psychosocial     None            Abuse/Neglect     None            Legal     None            Substance Abuse     None            Patient Forms     None          ROGELIO Becker

## 2017-08-04 NOTE — PROGRESS NOTES
[facility] Medicine Services  INPATIENT PROGRESS NOTE    Date of Admission: 8/3/2017  Length of Stay: 1  Primary Care Physician: Trevon Delarosa MD    Subjective   CC: anemia, renal failure  HPI:    Feels a little weak. Left leg painful (chronic)  A little anxious when waking up this am  Denies melena or brbpr      Review Of Systems:   Review of Systems   Constitutional: Positive for fatigue.   HENT: Negative.    Eyes: Negative.    Respiratory: Negative.    Gastrointestinal: Negative.    Endocrine: Negative.    Genitourinary: Negative.    Musculoskeletal: Positive for arthralgias.   Allergic/Immunologic: Negative.    Neurological: Positive for weakness.   Hematological: Negative.    Psychiatric/Behavioral: Negative.      Late entry for ROS obtained 8/4/17      Objective      Temp:  [98 °F (36.7 °C)-98.8 °F (37.1 °C)] 98.1 °F (36.7 °C)  Heart Rate:  [] 96  Resp:  [16-20] 20  BP: (132-185)/(62-91) 159/81  Physical Exam  Gen-no acute distress, non toxic, in bed  HEENT-NCAT, oropharynx clear  CV-RRR, S1 S2 normal, no m/r/g  Resp-CTAB, no wheezes, rhonchi or rales  Abd-soft, non-tender, non-distended, normo active bowel sounds; moribidly obese  Ext-1+ LE edema bilat  Neuro-alert and oriented x 3, speech clear, moves all extremities   Psych-normal affect   Skin- No rash on exposed UE or LE bilaterally      Results Review:    I have reviewed the labs, radiology results and diagnostic studies.      Results from last 7 days  Lab Units 08/03/17  1338   WBC 10*3/mm3 10.56   HEMOGLOBIN g/dL 7.3*   PLATELETS 10*3/mm3 497*       Results from last 7 days  Lab Units 08/03/17  1338   SODIUM mmol/L 137   POTASSIUM mmol/L 4.5   CHLORIDE mmol/L 101   CO2 mmol/L 29.0   BUN mg/dL 58*   CREATININE mg/dL 3.70*   GLUCOSE mg/dL 154*   CALCIUM mg/dL 8.9       Culture Data: Cultures:    Urine Culture   Date Value Ref Range Status   08/03/2017 >100,000 CFU/mL Gram Negative Bacilli (A)  Preliminary        Radiology Data:     I have reviewed the medications.    amLODIPine 10 mg Oral Daily   atorvastatin 10 mg Oral Daily   budesonide-formoterol 2 puff Inhalation BID - RT   ceftriaxone 1 g Intravenous Q24H   furosemide 40 mg Oral BID   hydrALAZINE 25 mg Oral TID   isosorbide mononitrate 60 mg Oral Daily   Pharmacy Meds to Bed Consult  Does not apply Daily         Assessment/Plan     Problem List  Hospital Problem List     * (Principal)Symptomatic anemia    Asthma    Diabetes mellitus    Hypertension    Morbid obesity    Renal failure    Overview Signed 8/3/2017  4:58 PM by SUELLEN Richey     Unclear whether this is a chronic problem.           Anxiety    Acute on chronic heart failure               Assessment/Plan:    NATALIO on CKD  - baseline unclear, but patient recently seen at [facility] with cr elevated to 4.6 (baseline reported 1.5)  - proteinuria  - nephrology evaluation, avoid nephrotoxins  - Discussed with Nephrology    Anemia  - chronicity unclear, do not have labs from OSH- will obtain (as well as pcp records)  - patient denies melena or brbpr, no prior endoscopy  - iron replacement per renal  - unclear if secondary to CKD vs GI loss  - will need colonoscopy +/- EGD IP vs OP, discussed with GI   - cbc am    DMII    Morbid obesity    Left leg pain and difficulty ambulating  - per OSH dc summary, duplex LE negative  - continue PT/OT  - OOBTC    UTI (POA)  - rocephin, follow cultures    VTE proph: mechanical, mobilize (no heparin at present due to anemia of unclear source).    Discharge Planning: I expect patient to be discharged to home vs rehab in 2-3 days.    Hilton Robledo MD   08/04/17   1:09 PM

## 2017-08-04 NOTE — PROGRESS NOTES
Continued Stay Note  [facility]     Patient Name: Joy Bueno  MRN: [id number]  Today's Date: 8/4/2017    Admit Date: 8/3/2017          Discharge Plan       08/04/17 1516    Case Management/Social Work Plan    Plan discharge planning    Patient/Family In Agreement With Plan yes    Additional Comments CM spoke with [facility] liaRenee kaufman.  Pt. will have a bed available when she is discharged and can return to [facility].  Ms. Nunes requested to be called ([phone]) and updated when pt is being discharged.              Discharge Codes     None            ROGELIO Becker

## 2017-08-04 NOTE — THERAPY EVALUATION
Acute Care - Occupational Therapy Initial Evaluation  [facility]     Patient Name: Joy Bueno  : 1950  MRN: [id number]  Today's Date: 2017  Onset of Illness/Injury or Date of Surgery Date: 17  Date of Referral to OT: 17  Referring Physician: SUELLEN Fischer    Admit Date: 8/3/2017       ICD-10-CM ICD-9-CM   1. Renal failure N19 586   2. Symptomatic anemia D64.9 285.9   3. Acute cystitis without hematuria N30.00 595.0   4. Morbid obesity due to excess calories E66.01 278.01   5. Adult failure to thrive syndrome R62.7 783.7   6. Impaired functional mobility, balance, gait, and endurance Z74.09 V49.89     Patient Active Problem List   Diagnosis   • Asthma   • Diabetes mellitus   • Hypertension   • Symptomatic anemia   • Morbid obesity   • Renal failure   • Anxiety   • Acute on chronic heart failure     Past Medical History:   Diagnosis Date   • Anemia    • Anxiety    • Asthma    • Diabetes mellitus    • Hypertension    • Morbid obesity    • Osteoarthritis      Past Surgical History:   Procedure Laterality Date   •  SECTION     • HERNIA REPAIR            OT ASSESSMENT FLOWSHEET (last 72 hours)      OT Evaluation       17 1020 17 1018 17 1017 17 0100       Rehab Evaluation    Document Type evaluation  -EH evaluation  -AC       Subjective Information no complaints;agree to therapy  - complains of;pain   declined EOB and OOB activity d/t pain  -AC       Patient Effort, Rehab Treatment  poor  -AC       Symptoms Noted During/After Treatment none  -EH        Symptoms Noted Comment pt stated legs continued to have pain  -        General Information    Patient Profile Review yes  -EH yes  -AC       Onset of Illness/Injury or Date of Surgery Date 17  -EH 17  -AC       Referring Physician SUELLEN Fischer  -EH SUELLEN Fischer  -ROSA       General Observations Pt in fowlers. Oximeter pulled off.  -EH Pt recived supine in bed.   -AC       Pertinent History Of  Current Problem Pt presents to hospital from outside rehab facility with low with low blood count, diarrhea, renal failure  - Pt admit with low blood count and diarrhea x 4 days; diagnosed with renal failure.  Recent admit to [facility] with asthma exacerbation and was discharged to rehab.   -       Precautions/Limitations fall precautions  - fall precautions  -       Prior Level of Function independent:   prior to july 4th  - independent:;all household mobility;community mobility;ADL's;driving   independent prior to July per pt  -AC       Equipment Currently Used at Home none   per pt report  -EH none   per pt report  -AC walker, standard  -EE none  -     Plans/Goals Discussed With patient;agreed upon  - patient  -AC       Risks Reviewed patient:;increased discomfort  - patient:;LOB;increased discomfort  -       Benefits Reviewed patient:;improve function;increase independence  - patient:;improve function;increase independence;increase strength;increase balance;increase knowledge  -       Barriers to Rehab cognitive status;ineffective coping  - --   in rehab prior to admit, mild confusion today  -       Living Environment    Lives With alone  - alone  - alone  -EE alone  -     Living Arrangements house  - house  -AC house  -EE house  -     Home Accessibility no concerns  - stairs to enter home;tub/shower is not walk in  -  no concerns  -     Number of Stairs to Enter Home  1  -AC       Stair Railings at Home    none  -     Type of Financial/Environmental Concern    none  -     Transportation Available   car;van, wheelchair accessible  - family or friend will provide;van, wheelchair accessible  -     Living Environment Comment   Pt. lives alone in her home in [place] [place].  Pt. stated she typically drives herself in her personal vehicle.  -EE      Clinical Impression    Date of Referral to OT  08/03/17  -AC       OT Diagnosis  ADL decline  -AC        Patient/Family Goals Statement  Pt did not state  -       Criteria for Skilled Therapeutic Interventions Met  yes;treatment indicated  -       Rehab Potential  good, to achieve stated therapy goals  -       Therapy Frequency  daily  -       Anticipated Discharge Disposition  skilled nursing facility  -       Functional Level Prior    Ambulation    3-->assistive equipment and person  -HM     Transferring    3-->assistive equipment and person  -HM     Toileting    3-->assistive equipment and person  -HM     Bathing    3-->assistive equipment and person  -HM     Dressing    3-->assistive equipment and person  -HM     Eating    3-->assistive equipment and person  -HM     Communication    0-->understands/communicates without difficulty  -     Swallowing    0-->swallows foods/liquids without difficulty  -     Prior Functional Level Comment    pt independent   -     Pain Assessment    Pain Assessment 0-10  -EH 0-10  -AC       Pain Score 10  -EH 10  -AC       Post Pain Score 10  -EH 10  -AC       Pain Type Chronic pain  -        Pain Location Leg  - Leg  -       Pain Orientation Right;Left  - Right;Left  -       Pain Intervention(s) Repositioned   notified RN  - Repositioned   notified   -       Vision Assessment/Intervention    Visual Impairment WFL  - WFL  -       Cognitive Assessment/Intervention    Current Cognitive/Communication Assessment impaired  - impaired  -AC       Orientation Status oriented x 4  - oriented to;oriented x 4  -       Follows Commands/Answers Questions 75% of the time;able to follow single-step instructions;needs cueing;needs increased time;needs repetition  - 75% of the time;needs cueing;needs repetition  -       Personal Safety mild impairment  - mild impairment  -       Personal Safety Interventions fall prevention program maintained  - fall prevention program maintained;gait belt;nonskid shoes/slippers when out of bed  -       ROM (Range of  Motion)    General ROM  no range of motion deficits identified  -       General ROM Detail RLE: DF/PF limited x 50%, Knee ROM WFL, hip ROM, WFL.   LLE: PF/DF lacking 50%, knee extension intact, pt does not allow flexion and screams with hip rotation in attempt to start with less painful motins.  -        MMT (Manual Muscle Testing)    General MMT Assessment  upper extremity strength deficits identified  -       General MMT Assessment Detail DF, GTE, BF 4+/5; remaining not tested 2/2 pt pain  -EH 3+/5  -       Bed Mobility, Assessment/Treatment    Bed Mobility, Comment deferred; Pt refuses and tears up when encouraged.  - pt declined and began to tear up when encouraged to sit EOB  -       Transfer Assessment/Treatment    Transfer, Comment  pt declined  -       Therapy Exercises    Bilateral Lower Extremities ankle pumps/circles;10 reps   educated on QS and GS  -        Bilateral Upper Extremity  AROM:;10 reps;hand pumps;elbow flexion/extension;pronation/supination;shoulder extension/flexion;shoulder horizontal abd/add  -       Sensory Assessment/Intervention    Light Touch LLE;RLE   states BLE feet itch when she needs to be bathed.  -        LLE Light Touch WNL  -        RLE Light Touch WNL  -        General Therapy Interventions    Planned Therapy Interventions  ADL retraining;balance training;bed mobility training;strengthening;transfer training  -       Positioning and Restraints    Pre-Treatment Position in bed  - in bed  -       Post Treatment Position bed  - bed  -AC       In Bed fowlers;call light within reach;encouraged to call for assist;notified [facility]  - fowlers;call light within reach  -         User Key  (r) = Recorded By, (t) = Taken By, (c) = Cosigned By    Initials Name Effective Dates    AC Mirna Orellana, OT 06/23/15 -      Justa Guerra, PT 06/19/15 -      Belgica Torres, MSW 03/14/16 -      Jaimie Pérez, RN 02/22/17 -            Occupational Therapy  Education     Title: PT OT SLP Therapies (Active)     Topic: Occupational Therapy (Active)     Point: ADL training (Active)    Description: Instruct learner(s) on proper safety adaptation and remediation techniques during self care or transfers.   Instruct in proper use of assistive devices.    Learning Progress Summary    Learner Readiness Method Response Comment Documented by Status   Patient Acceptance E NR Benefits of activity ( pt agreeable to in bed activity); role of OT  08/04/17 1058 Active                      User Key     Initials Effective Dates Name Provider Type Discipline     06/23/15 -  Mirna Orellana OT Occupational Therapist OT                  OT Recommendation and Plan  Anticipated Discharge Disposition: skilled nursing facility  Planned Therapy Interventions: ADL retraining, balance training, bed mobility training, strengthening, transfer training  Therapy Frequency: daily  Plan of Care Review  Plan Of Care Reviewed With: patient  Outcome Summary/Follow up Plan: Pt declining OOB/EOB activity d/t LE pain.  Pt completed 1 set x 10 BUE AROM. Pt with mild confusion, decreased strength, and decline in ADL performance. Pt will benefit from OT to advance pt  toward PLOF with self care.  Recommend SNF for rehab upon d/c.           OT Goals       08/04/17 1059          Bed Mobility OT LTG    Bed Mobility OT LTG, Date Established 08/04/17  -      Bed Mobility OT LTG, Time to Achieve by discharge  -      Bed Mobility OT LTG, Activity Type supine to sit/sit to supine  -      Bed Mobility OT LTG, [place] Level moderate assist (50% patient effort);2 person assist required  -      Bed Mobility OT LTG, Assist Device bed rails  -      Transfer Training OT LTG    Transfer Training OT LTG, Date Established 08/04/17  -      Transfer Training OT LTG, Time to Achieve by discharge  -      Transfer Training OT LTG, Activity Type sit to stand/stand to sit;bed to chair /chair to bed;toilet  -       Transfer Training OT LTG, [place] Level moderate assist (50% patient effort);2 person assist required  -AC      Transfer Training OT LTG, Assist Device walker, rolling  -AC      Strength OT LTG    Strength Goal OT LTG, Date Established 08/04/17  -AC      Strength Goal OT LTG, Time to Achieve by discharge  -AC      Strength Goal OT LTG, Measure to Achieve Pt will complete 2 sets x 15 BUE AROM as needed to support ADLs  -AC      Dynamic Sitting Balance OT LTG    Dynamic Sitting Balance OT LTG, Date Established 08/04/17  -AC      Dynamic Sitting Balance OT LTG, Time to Achieve by discharge  -AC      Dynamic Sitting Balance OT LTG, [place] Level contact guard assist  -AC      Dynamic Sitting Balance OT LTG, Assist Device UE Support  -AC      Grooming OT LTG    Grooming Goal OT LTG, Date Established 08/04/17  -AC      Grooming Goal OT LTG, Time to Achieve by discharge  -AC      Grooming Goal OT LTG, Activity Type Pt will wash face and brush teeth after setup  -AC      Grooming Goal OT LTG, [place] Level set up required  -AC        User Key  (r) = Recorded By, (t) = Taken By, (c) = Cosigned By    Initials Name Provider Type    AC Mirna Orellana, OT Occupational Therapist                Outcome Measures       08/04/17 1020 08/04/17 1018       How much help from another person do you currently need...    Turning from your back to your side while in flat bed without using bedrails? 2  -EH      Moving from lying on back to sitting on the side of a flat bed without bedrails? 2  -EH      Moving to and from a bed to a chair (including a wheelchair)? 2  -EH      Standing up from a chair using your arms (e.g., wheelchair, bedside chair)? 2  -EH      Climbing 3-5 steps with a railing? 1  -EH      To walk in hospital room? 2  -EH      AM-PAC 6 Clicks Score 11  -EH      How much help from another is currently needed...    Putting on and taking off regular lower body clothing?  1  -AC     Bathing (including washing,  rinsing, and drying)  1  -AC     Toileting (which includes using toilet bed pan or urinal)  2  -AC     Putting on and taking off regular upper body clothing  2  -AC     Taking care of personal grooming (such as brushing teeth)  2  -AC     Eating meals  3  -AC     Score  11  -AC     Functional Assessment    Outcome Measure Options AM-PAC 6 Clicks Basic Mobility (PT)  - AM-PAC 6 Clicks Daily Activity (OT)  -AC       User Key  (r) = Recorded By, (t) = Taken By, (c) = Cosigned By    Initials Name Provider Type    AC Mirna Orellana, OT Occupational Therapist     Justa Guerra, PT Physical Therapist          Time Calculation:   OT Start Time: 1018    Therapy Charges for Today     Code Description Service Date Service Provider Modifiers Qty    05131125499  OT EVAL MOD COMPLEXITY 3 8/4/2017 Mirna Orellana OT GO 1    [id number]  OT THERAPEUTIC ACT EA 15 MIN 8/4/2017 Mirna Orellana OT GO 1               Mirna Orellana OT  8/4/2017

## 2017-08-04 NOTE — CONSULTS
NAL Consult Note    Joy Bueno  1950  [id number]    Date of Admit:  8/3/2017    Date of Consult: 2017        Requesting Provider: No ref. provider found  Evaluating Physician: Rocky Nova MD        Reason for Consultation:  NATALIO AND CKD.  History of present illness:    Patient is a 67 y.o.  Yr old female WITH H/O NATALIO AND CKD SEEN BY [facility] AT [facility] BETWEEN 2014-2017. CREAT 3.7 HERE. WAS 4.18 17, 3.2 17, 1.5 2017. APPARENTLY THEY STOPPED ACE AND METFORMIN BUT SHE GOT BACK ON THEM AT REHAB. W/U BY [facility] INCLUDED 24 HR URINE PROTEIN 14 GRAMS AND A NEG SPEP AND UIEP. THEY WERE CONSIDERING DOING A RENAL BX BUT SHE APPARENTLY GOT SENT TO REHAB BEFORE THIS COULD BE DONE. NO NSAIA USE AS FAR AS I CAN TELL. REBECANT IS A POOR HISTORIAN.       Past Medical History:   Diagnosis Date   • Anemia    • Anxiety    • Asthma    • Diabetes mellitus    • Hypertension    • Morbid obesity    • Osteoarthritis        Past Surgical History:   Procedure Laterality Date   •  SECTION     • HERNIA REPAIR         Social History     Social History   • Marital status:      Spouse name: N/A   • Number of children: N/A   • Years of education: N/A     Social History Main Topics   • Smoking status: Former Smoker   • Smokeless tobacco: None   • Alcohol use No   • Drug use: No   • Sexual activity: Not Asked     Other Topics Concern   • None     Social History Narrative    lIVES ALONE- CURRENTLY IN REHAB FOR ARTHRITIS       family history is not on file. NO FH OF RENAL DZ.    No Known Allergies    Medication:    Current Facility-Administered Medications:   •  acetaminophen (TYLENOL) tablet 650 mg, 650 mg, Oral, Q4H PRN, Mary Amado, APRN, 650 mg at 17 0132  •  albuterol (PROVENTIL) nebulizer solution 0.5% 2.5 mg/0.5mL, 2.5 mg, Nebulization, Q6H PRN, Mary Vermillion, APRN, 2.5 mg at 17 0921  •  amLODIPine (NORVASC) tablet 10 mg, 10 mg, Oral, Daily, Mary Amado, APRN, 10 mg at  08/03/17 2341  •  atorvastatin (LIPITOR) tablet 10 mg, 10 mg, Oral, Daily, Mary Cannon, APRN, 10 mg at 08/03/17 2341  •  bisacodyl (DULCOLAX) EC tablet 5 mg, 5 mg, Oral, Daily PRN, Mary Amado, APRN  •  bisacodyl (DULCOLAX) suppository 10 mg, 10 mg, Rectal, Daily PRN, Mary Cannon, APRN  •  budesonide-formoterol (SYMBICORT) 80-4.5 MCG/ACT inhaler 2 puff, 2 puff, Inhalation, BID - RT, Mary Amado, APRN, 2 puff at 08/04/17 0813  •  diazePAM (VALIUM) tablet 2 mg, 2 mg, Oral, BID PRN, Mary Cannon, APRN  •  furosemide (LASIX) tablet 40 mg, 40 mg, Oral, BID, Natali M Colten II, DO, 40 mg at 08/04/17 0841  •  hydrALAZINE (APRESOLINE) tablet 25 mg, 25 mg, Oral, TID, Mary Cannon, APRN, 25 mg at 08/04/17 0543  •  HYDROcodone-acetaminophen (NORCO) 5-325 MG per tablet 1 tablet, 1 tablet, Oral, Q4H PRN, Mary Amado, APRN, 1 tablet at 08/04/17 1003  •  isosorbide mononitrate (IMDUR) 24 hr tablet 60 mg, 60 mg, Oral, Daily, Natali M Colten II, DO, 60 mg at 08/03/17 2341  •  LORazepam (ATIVAN) tablet 1 mg, 1 mg, Oral, Q6H PRN, Mary Cannon, APRN  •  ondansetron (ZOFRAN) tablet 4 mg, 4 mg, Oral, Q6H PRN **OR** ondansetron (ZOFRAN) injection 4 mg, 4 mg, Intravenous, Q6H PRN, Mary Amado, APRN  •  Pharmacy Meds to Bed Consult, , Does not apply, Daily, Bijal Zarate [facility]  •  sodium chloride 0.9 % flush 1-10 mL, 1-10 mL, Intravenous, PRN, Mary Amado, APRN  •  Insert peripheral IV, , , Once **AND** sodium chloride 0.9 % flush 10 mL, 10 mL, Intravenous, PRN, Micheal Guzman MD    Prescriptions Prior to Admission   Medication Sig Dispense Refill Last Dose   • albuterol (PROVENTIL HFA;VENTOLIN HFA) 108 (90 BASE) MCG/ACT inhaler Inhale 2 puffs Every 4 (Four) Hours As Needed for Wheezing.      • amLODIPine (NORVASC) 10 MG tablet Take 10 mg by mouth Daily.      • atorvastatin (LIPITOR) 10 MG tablet Take 10 mg by mouth Daily.      • diazePAM (VALIUM) 2 MG tablet Take 2 mg by mouth 2 (Two) Times a Day As Needed for Anxiety.      • Fluticasone  Furoate-Vilanterol (BREO ELLIPTA) 200-25 MCG/INH aerosol powder  Inhale.      • hydrALAZINE (APRESOLINE) 25 MG tablet Take 25 mg by mouth 3 (Three) Times a Day.      • insulin aspart (novoLOG) 100 UNIT/ML injection Inject 15 Units under the skin 3 (Three) Times a Day Before Meals.      • Insulin Detemir (LEVEMIR FLEXTOUCH SC) Inject 45 Units under the skin Every Night.      • isosorbide mononitrate (IMDUR) 30 MG 24 hr tablet Take 30 mg by mouth Daily.      • lisinopril (PRINIVIL,ZESTRIL) 40 MG tablet Take 40 mg by mouth Daily.      • metFORMIN (GLUCOPHAGE) 1000 MG tablet Take 1,000 mg by mouth 2 (Two) Times a Day With Meals.      • traMADol (ULTRAM) 50 MG tablet Take  mg by mouth Every 8 (Eight) Hours As Needed for Moderate Pain (4-6).      • triamterene-hydrochlorothiazide (MAXZIDE-25) 37.5-25 MG per tablet Take 1 tablet by mouth Daily.          Review of Systems:    Constitutional-- No Fever, chills or sweats. No significant change in weight. + FATIGUE.  Eye-- no diplopia, no conjunctivitis  ENT-- No new hearing or throat complaints.  No epistaxis or oral sores. No odynophagia or dysphagia. + headache. NO photophobia or neck stiffness.  CV-- No chest pain, palpitations, soa, or edema  Resp-- + SOB/cough/WHEEZING.NO Hemoptysis  GI- No nausea/vomiting. + diarrhea.  No hematochezia, melena, or hematemesis. + ABD PAIN.  -- No dysuria, hematuria, or flank pain. No lower tract obstructive symptoms  Skin-- No rash, warm and dry  Lymph- no painful or swollen lymph nodes in neck/axilla or groin.   Heme- No active bruising or bleeding; no Hx of DVT or PE.  MS-- + swelling & pain in the joints  Neuro-- No acute focal weakness or numbness in the arms or legs.  No seizures. + WEAKNESS  Psych--+ anxiety + depression  Endo--No cold or heat intolerance.  No polyuria, polydipsia, or polyphagia    Full review of systems reviewed and negative otherwise for acute complaints    Physical Exam:   Vital Signs   Blood pressure  "159/81, pulse 96, temperature 98.1 °F (36.7 °C), temperature source Oral, resp. rate 20, height 64\" (162.6 cm), weight 273 lb 4.8 oz (124 kg), SpO2 94 %.     GENERAL: Awake and alert, in no acute distress.   HEENT: Normocephalic, atraumatic.  PER.  No conjunctivitis. No icterus. Oropharynx clear without evidence of thrush or exudate. No evidence of peridontal disease.    NECK: Supple without nuchal rigidity. No mass.  LYMPH: No painful cervical, axillary or inguinal lymphadenopathy.  HEART: RRR; No murmur, rubs, gallops. No bruits in neck, abdomen, or groins, no edema  LUNGS: Normal respiratory effort. Nonlabored. No dullness.  No crepitus.  + wheezing. NO rales OR rhonchi.  ABDOMEN: Soft, nontender, nondistended. Positive bowel sounds. No rebound or guarding. NO mass or HSM. VERY OBESE  JOINTS:  Full range of motion, no redness or tenderness.  EXT:  No cyanosis OR clubbing.  1+ edema.  :  BLADDER NOT DISTENDED. NO CVA TENDERNESS.  SKIN: Warm and dry without rash  NEURO: Oriented to PPT. No focal neurological deficits. Strength equal bilateral  PSYCHIATRIC: Normal insight and judgement. Cooperative with PE    Laboratory Data    Results from last 7 days  Lab Units 08/03/17  1338   HEMOGLOBIN g/dL 7.3*   HEMATOCRIT % 24.3*       Results from last 7 days  Lab Units 08/03/17  1338   SODIUM mmol/L 137   POTASSIUM mmol/L 4.5   CHLORIDE mmol/L 101   CO2 mmol/L 29.0   BUN mg/dL 58*   CREATININE mg/dL 3.70*   CALCIUM mg/dL 8.9   ALBUMIN g/dL 3.20       Results from last 7 days  Lab Units 08/03/17  1338   GLUCOSE mg/dL 154*       Results from last 7 days  Lab Units 08/03/17  1430   COLOR UA  Yellow   CLARITY UA  Cloudy*   PH, URINE  7.5   SPECIFIC GRAVITY, URINE  1.010   GLUCOSE UA  Negative   KETONES UA  Negative   BILIRUBIN UA  Negative   PROTEIN UA  100 mg/dL (2+)*   BLOOD UA  Trace*   LEUKOCYTES UA  Large (3+)*   NITRITE UA  Negative       Results from last 7 days  Lab Units 08/03/17  1338   ALK PHOS U/L 104* "   BILIRUBIN mg/dL 0.2*   ALT (SGPT) U/L 15   AST (SGOT) U/L 13     Estimated Creatinine Clearance: 19.5 mL/min (by C-G formula based on Cr of 3.7).    Radiology:          Impression: NATALIO ? ETIOLOGY. GFR IS ACTUALLY ALITTLE BETTER THAN IT WAS A COUPLE OF WEEKS AGO. CKD WITH NEPHROTIC RANGE PROTEINURIA. ? ETIOLOGY. ? DM NEPHROPATHY.ANEMIA WITH LOW FE. OBESITY. UTI.      PLAN: Thank you for asking us to see Joy Bueno, I recommend the following:AGREE WITH HOLDING ACE AND METFORMIN ALONG WITH MAXZIDE. RENAL U/S. P/C RATIO. URINE EOS. CPK. [place]. ANCA. IV FE. WILL FOLLOW. ? NEED FOR RENAL BX WHICH WOULD BE DIFFICULT WITH HER SIZE.        Rocky Nova MD  8/4/2017  12:52 PM

## 2017-08-04 NOTE — PROGRESS NOTES
Continued Stay Note  [facility]     Patient Name: Joy Bueno  MRN: [id number]  Today's Date: 8/4/2017    Admit Date: 8/3/2017          Discharge Plan       08/04/17 1621    Case Management/Social Work Plan    Plan discharge plan    Patient/Family In Agreement With Plan yes    Additional Comments CM and pt discussed discharge planning.  Pt. understands she will be able to return to [facility] at discharge.  Pt. has requested she return via ambulance/[facility], which will need to be arranged.  Per hospitalist note, pt. may be discharged on Sunday or Monday.  Case managment will need to fax discharge summary to [facility] at [phone] and RN will need to call report to [phone].      08/04/17 1516    Case Management/Social Work Plan    Plan discharge planning    Patient/Family In Agreement With Plan yes    Additional Comments CM spoke with Renee Tapia.  Pt. will have a bed available when she is discharged and can return to [facility].  Ms. Nunes requested to be called ([phone]) and updated when pt is being discharged.              Discharge Codes     None            ROGELIO Becker

## 2017-08-04 NOTE — THERAPY EVALUATION
Acute Care - Physical Therapy Initial Evaluation  [facility]     Patient Name: Joy Bueno  : 1950  MRN: [id number]  Today's Date: 2017   Onset of Illness/Injury or Date of Surgery Date: 17  Date of Referral to PT: 17  Referring Physician: SUELLEN Fischer      Admit Date: 8/3/2017     Visit Dx:    ICD-10-CM ICD-9-CM   1. Renal failure N19 586   2. Symptomatic anemia D64.9 285.9   3. Acute cystitis without hematuria N30.00 595.0   4. Morbid obesity due to excess calories E66.01 278.01   5. Adult failure to thrive syndrome R62.7 783.7   6. Impaired functional mobility, balance, gait, and endurance Z74.09 V49.89     Patient Active Problem List   Diagnosis   • Asthma   • Diabetes mellitus   • Hypertension   • Symptomatic anemia   • Morbid obesity   • Renal failure   • Anxiety   • Acute on chronic heart failure     Past Medical History:   Diagnosis Date   • Anemia    • Anxiety    • Asthma    • Diabetes mellitus    • Hypertension    • Morbid obesity    • Osteoarthritis      Past Surgical History:   Procedure Laterality Date   •  SECTION     • HERNIA REPAIR            PT ASSESSMENT (last 72 hours)      PT Evaluation       17 1020 17 1018    Rehab Evaluation    Document Type evaluation  - evaluation  -AC    Subjective Information no complaints;agree to therapy  - complains of;pain   declined EOB and OOB activity d/t pain  -AC    Patient Effort, Rehab Treatment  poor  -AC    Symptoms Noted During/After Treatment none  -EH     Symptoms Noted Comment pt stated legs continued to have pain  -     General Information    Patient Profile Review yes  -EH yes  -AC    Onset of Illness/Injury or Date of Surgery Date 17  -EH 17  -AC    Referring Physician SUELLEN Fischer  - SUELLEN Fischer  -ROSA    General Observations Pt in fowlers. Oximeter pulled off.  -EH Pt recived supine in bed.   -AC    Pertinent History Of Current Problem Pt presents to hospital from outside rehab  facility with low with low blood count, diarrhea, renal failure  - Pt admit with low blood count and diarrhea x 4 days; diagnosed with renal failure.  Recent admit to [facility] with asthma exacerbation and was discharged to rehab.   -    Precautions/Limitations fall precautions  - fall precautions  -    Prior Level of Function independent:   prior to july 4th  - independent:;all household mobility;community mobility;ADL's;driving   independent prior to July per pt  -    Equipment Currently Used at Home none   per pt report  - none   per pt report  -    Plans/Goals Discussed With patient;agreed upon  - patient  -AC    Risks Reviewed patient:;increased discomfort  - patient:;LOB;increased discomfort  -    Benefits Reviewed patient:;improve function;increase independence  - patient:;improve function;increase independence;increase strength;increase balance;increase knowledge  -    Barriers to Rehab cognitive status;ineffective coping  - --   in rehab prior to admit, mild confusion today  -    Living Environment    Lives With alone  - alone  -    Living Arrangements [place]  - house  -    Home Accessibility no concerns  - stairs to enter home;tub/shower is not walk in  -    Number of Stairs to Enter Home  1  -AC    Clinical Impression    Date of Referral to PT 08/03/17  -     PT Diagnosis decreased functional mobility, decreased independence with mobility  -     Patient/Family Goals Statement pt wanted to wiggle only today; get OOB over weekend  -     Rehab Potential fair, will monitor progress closely  -     Pain Assessment    Pain Assessment 0-10  - 0-10  -AC    Pain Score 10  -EH 10  -AC    Post Pain Score 10  - 10  -AC    Pain Type Chronic pain  -     Pain Location Leg  - Leg  -    Pain Orientation Right;Left  - Right;Left  -    Pain Intervention(s) Repositioned   notified RN  - Repositioned   notified RN  -AC    Vision Assessment/Intervention     Visual Impairment WFL  -EH WFL  -AC    Cognitive Assessment/Intervention    Current Cognitive/Communication Assessment impaired  - impaired  -AC    Orientation Status oriented x 4  -EH oriented to;oriented x 4  -    Follows Commands/Answers Questions 75% of the time;able to follow single-step instructions;needs cueing;needs increased time;needs repetition  - 75% of the time;needs cueing;needs repetition  -    Personal Safety mild impairment  - mild impairment  -    Personal Safety Interventions fall prevention program maintained  - fall prevention program maintained;gait belt;nonskid shoes/slippers when out of bed  -    ROM (Range of Motion)    General ROM  no range of motion deficits identified  -    General ROM Detail RLE: DF/PF limited x 50%, Knee ROM WFL, hip ROM, WFL.   LLE: PF/DF lacking 50%, knee extension intact, pt does not allow flexion and screams with hip rotation in attempt to start with less painful motins.  -     MMT (Manual Muscle Testing)    General MMT Assessment  upper extremity strength deficits identified  -    General MMT Assessment Detail DF, GTE, BF 4+/5; remaining not tested 2/2 pt pain  - 3+/5  -    Bed Mobility, Assessment/Treatment    Bed Mobility, Comment deferred; Pt refuses and tears up when encouraged.  - pt declined and began to tear up when encouraged to sit EOB  -    Transfer Assessment/Treatment    Transfer, Comment  pt declined  -    Therapy Exercises    Bilateral Lower Extremities ankle pumps/circles;10 reps   educated on QS and GS  -     Bilateral Upper Extremity  AROM:;10 reps;hand pumps;elbow flexion/extension;pronation/supination;shoulder extension/flexion;shoulder horizontal abd/add  -    Sensory Assessment/Intervention    Light Touch LLE;RLE   states BLE feet itch when she needs to be bathed.  -     LLE Light Touch WNL  -EH     RLE Light Touch WNL  -     Positioning and Restraints    Pre-Treatment Position in bed  - in bed  -     Post Treatment Position bed  -EH bed  -AC    In Bed fowlers;call light within reach;encouraged to call for assist;notified nsg  - fowlers;call light within reach  -AC      08/04/17 1017 08/04/17 0100    General Information    Equipment Currently Used at Home walker, standard  -EE none  -HM    Living Environment    Lives With alone  -EE alone  -HM    Living Arrangements house  -EE house  -HM    Home Accessibility  no concerns  -HM    Stair Railings at Home  none  -HM    Type of Financial/Environmental Concern  none  -HM    Transportation Available car;van, wheelchair accessible  -EE family or friend will provide;van, wheelchair accessible  -HM    Living Environment Comment Pt. lives alone in her home in [place].  Pt. stated she typically drives herself in her personal vehicle.  -EE       User Key  (r) = Recorded By, (t) = Taken By, (c) = Cosigned By    Initials Name Provider Type     Mirna Orellana, OT Occupational Therapist     Justa Guerra, PT Physical Therapist     Belgica Torres, MSW      Jaimie Pérez, RN Registered Nurse          Physical Therapy Education     Title: PT OT SLP Therapies (Active)     Topic: Physical Therapy (Active)     Point: Precautions (Active)    Learning Progress Summary    Learner Readiness Method Response Comment Documented by Status   Patient Acceptance E NR Pt educated on POC and need for OOB mobility to prevent functional decline, PNA. Pt continues to refuse mobility assessment  08/04/17 1101 Active                      User Key     Initials Effective Dates Name Provider Type Discipline     06/19/15 -  Justa Guerra, PT Physical Therapist PT                PT Recommendation and Plan  Anticipated Discharge Disposition: skilled nursing facility  PT Frequency: daily  Plan of Care Review  Plan Of Care Reviewed With: patient  Outcome Summary/Follow up Plan: Pt declines mobility this date, allowing for bed level assessment only. She presents  with confusion, decreased strength and ROM with Pain in BLE L>R. Pt would benefit from SNF level care  upon d.c,          IP PT Goals       08/04/17 1103          Bed Mobility PT LTG    Bed Mobility PT LTG, Date Established 08/04/17  -EH      Bed Mobility PT LTG, Time to Achieve 2 wks  -EH      Bed Mobility PT LTG, Activity Type supine to sit/sit to supine  -EH      Bed Mobility PT LTG, [place] Level moderate assist (50% patient effort);2 person assist required  -EH      Transfer Training PT LTG    Transfer Training PT LTG, Date Established 08/04/17  -EH      Transfer Training PT LTG, Time to Achieve 2 wks  -EH      Transfer Training PT LTG, Activity Type sit to stand/stand to sit  -EH      Transfer Training PT LTG, [place] Level moderate assist (50% patient effort);2 person assist required  -EH      Transfer Training PT LTG, Assist Device walker, rolling  -EH      Gait Training PT LTG    Gait Training Goal PT LTG, Date Established 08/04/17  -EH      Gait Training Goal PT LTG, Time to Achieve 2 wks  -EH      Gait Training Goal PT LTG, [place] Level moderate assist (50% patient effort);2 person assist required  -EH      Gait Training Goal PT LTG, Assist Device walker, rolling  -EH      Gait Training Goal PT LTG, Distance to Achieve 10  -EH        User Key  (r) = Recorded By, (t) = Taken By, (c) = Cosigned By    Initials Name Provider Type    EH Justa Guerra, PT Physical Therapist                Outcome Measures       08/04/17 1020 08/04/17 1018       How much help from another person do you currently need...    Turning from your back to your side while in flat bed without using bedrails? 2  -EH      Moving from lying on back to sitting on the side of a flat bed without bedrails? 2  -EH      Moving to and from a bed to a chair (including a wheelchair)? 2  -EH      Standing up from a chair using your arms (e.g., wheelchair, bedside chair)? 2  -EH      Climbing 3-5 steps with a railing? 1  -EH       To walk in hospital room? 2  -EH      AM-PAC 6 Clicks Score 11  -      How much help from another is currently needed...    Putting on and taking off regular lower body clothing?  1  -AC     Bathing (including washing, rinsing, and drying)  1  -AC     Toileting (which includes using toilet bed pan or urinal)  2  -AC     Putting on and taking off regular upper body clothing  2  -AC     Taking care of personal grooming (such as brushing teeth)  2  -AC     Eating meals  3  -AC     Score  11  -AC     Functional Assessment    Outcome Measure Options AM-PAC 6 Clicks Basic Mobility (PT)  - AM-PAC 6 Clicks Daily Activity (OT)  -       User Key  (r) = Recorded By, (t) = Taken By, (c) = Cosigned By    Initials Name Provider Type    AC Mirna Orellana, OT Occupational Therapist     Justa Guerra, PT Physical Therapist           Time Calculation:         PT Charges       08/04/17 1103          Time Calculation    Start Time 1020  -      PT Received On 08/04/17  -      PT Goal Re-Cert Due Date 08/14/17  -      Time Calculation- PT    Total Timed Code Minutes- PT 0 minute(s)  -        User Key  (r) = Recorded By, (t) = Taken By, (c) = Cosigned By    Initials Name Provider Type     Justa Guerra, PT Physical Therapist          Therapy Charges for Today     Code Description Service Date Service Provider Modifiers Qty    [id number]  PT EVAL MOD COMPLEXITY 4 8/4/2017 Justa Guerra, PT GP 1          PT G-Codes  Outcome Measure Options: AM-PAC 6 Clicks Basic Mobility (PT)      Justa Guerra, PT  8/4/2017

## 2017-08-05 LAB
ALBUMIN SERPL-MCNC: 3.2 G/DL (ref 3.2–4.8)
ANION GAP SERPL CALCULATED.3IONS-SCNC: 5 MMOL/L (ref 3–11)
BACTERIA SPEC AEROBE CULT: ABNORMAL
BASOPHILS # BLD AUTO: 0.02 10*3/MM3 (ref 0–0.2)
BASOPHILS NFR BLD AUTO: 0.2 % (ref 0–1)
BUN BLD-MCNC: 59 MG/DL (ref 9–23)
BUN/CREAT SERPL: 16.4 (ref 7–25)
CALCIUM SPEC-SCNC: 8.8 MG/DL (ref 8.7–10.4)
CHLORIDE SERPL-SCNC: 98 MMOL/L (ref 99–109)
CK SERPL-CCNC: 42 U/L (ref 26–174)
CO2 SERPL-SCNC: 33 MMOL/L (ref 20–31)
CREAT BLD-MCNC: 3.6 MG/DL (ref 0.6–1.3)
DEPRECATED RDW RBC AUTO: 51 FL (ref 37–54)
EOSINOPHIL # BLD AUTO: 0.3 10*3/MM3 (ref 0–0.3)
EOSINOPHIL NFR BLD AUTO: 3.5 % (ref 0–3)
ERYTHROCYTE [DISTWIDTH] IN BLOOD BY AUTOMATED COUNT: 15.2 % (ref 11.3–14.5)
GFR SERPL CREATININE-BSD FRML MDRD: 15 ML/MIN/1.73
GLUCOSE BLD-MCNC: 174 MG/DL (ref 70–100)
GLUCOSE BLDC GLUCOMTR-MCNC: 127 MG/DL (ref 70–130)
GLUCOSE BLDC GLUCOMTR-MCNC: 180 MG/DL (ref 70–130)
GLUCOSE BLDC GLUCOMTR-MCNC: 186 MG/DL (ref 70–130)
GLUCOSE BLDC GLUCOMTR-MCNC: 188 MG/DL (ref 70–130)
GLUCOSE BLDC GLUCOMTR-MCNC: 225 MG/DL (ref 70–130)
HCT VFR BLD AUTO: 25.3 % (ref 34.5–44)
HGB BLD-MCNC: 7.8 G/DL (ref 11.5–15.5)
IMM GRANULOCYTES # BLD: 0.04 10*3/MM3 (ref 0–0.03)
IMM GRANULOCYTES NFR BLD: 0.5 % (ref 0–0.6)
INR PPP: 0.96
LYMPHOCYTES # BLD AUTO: 1.42 10*3/MM3 (ref 0.6–4.8)
LYMPHOCYTES NFR BLD AUTO: 16.4 % (ref 24–44)
MCH RBC QN AUTO: 28 PG (ref 27–31)
MCHC RBC AUTO-ENTMCNC: 30.8 G/DL (ref 32–36)
MCV RBC AUTO: 90.7 FL (ref 80–99)
MONOCYTES # BLD AUTO: 0.6 10*3/MM3 (ref 0–1)
MONOCYTES NFR BLD AUTO: 6.9 % (ref 0–12)
NEUTROPHILS # BLD AUTO: 6.28 10*3/MM3 (ref 1.5–8.3)
NEUTROPHILS NFR BLD AUTO: 72.5 % (ref 41–71)
PHOSPHATE SERPL-MCNC: 5.8 MG/DL (ref 2.4–5.1)
PLATELET # BLD AUTO: 414 10*3/MM3 (ref 150–450)
PMV BLD AUTO: 9.3 FL (ref 6–12)
POTASSIUM BLD-SCNC: 4.5 MMOL/L (ref 3.5–5.5)
PROTHROMBIN TIME: 10.4 SECONDS (ref 9.6–11.5)
RBC # BLD AUTO: 2.79 10*6/MM3 (ref 3.89–5.14)
SODIUM BLD-SCNC: 136 MMOL/L (ref 132–146)
WBC NRBC COR # BLD: 8.66 10*3/MM3 (ref 3.5–10.8)

## 2017-08-05 PROCEDURE — 86256 FLUORESCENT ANTIBODY TITER: CPT | Performed by: INTERNAL MEDICINE

## 2017-08-05 PROCEDURE — 94799 UNLISTED PULMONARY SVC/PX: CPT

## 2017-08-05 PROCEDURE — 82962 GLUCOSE BLOOD TEST: CPT

## 2017-08-05 PROCEDURE — 85610 PROTHROMBIN TIME: CPT | Performed by: INTERNAL MEDICINE

## 2017-08-05 PROCEDURE — 83520 IMMUNOASSAY QUANT NOS NONAB: CPT | Performed by: INTERNAL MEDICINE

## 2017-08-05 PROCEDURE — 80069 RENAL FUNCTION PANEL: CPT | Performed by: INTERNAL MEDICINE

## 2017-08-05 PROCEDURE — 25010000002 CEFTRIAXONE PER 250 MG: Performed by: INTERNAL MEDICINE

## 2017-08-05 PROCEDURE — 86038 ANTINUCLEAR ANTIBODIES: CPT | Performed by: INTERNAL MEDICINE

## 2017-08-05 PROCEDURE — 94640 AIRWAY INHALATION TREATMENT: CPT

## 2017-08-05 PROCEDURE — 99233 SBSQ HOSP IP/OBS HIGH 50: CPT | Performed by: INTERNAL MEDICINE

## 2017-08-05 PROCEDURE — 85025 COMPLETE CBC W/AUTO DIFF WBC: CPT | Performed by: INTERNAL MEDICINE

## 2017-08-05 PROCEDURE — 82550 ASSAY OF CK (CPK): CPT | Performed by: INTERNAL MEDICINE

## 2017-08-05 PROCEDURE — 25010000002 NA FERRIC GLUC CPLX PER 12.5 MG: Performed by: INTERNAL MEDICINE

## 2017-08-05 RX ORDER — SACCHAROMYCES BOULARDII 250 MG
250 CAPSULE ORAL 2 TIMES DAILY
Status: DISCONTINUED | OUTPATIENT
Start: 2017-08-05 | End: 2017-08-12 | Stop reason: HOSPADM

## 2017-08-05 RX ORDER — SENNA AND DOCUSATE SODIUM 50; 8.6 MG/1; MG/1
2 TABLET, FILM COATED ORAL NIGHTLY
Status: DISCONTINUED | OUTPATIENT
Start: 2017-08-05 | End: 2017-08-12 | Stop reason: HOSPADM

## 2017-08-05 RX ADMIN — ISOSORBIDE MONONITRATE 60 MG: 60 TABLET, EXTENDED RELEASE ORAL at 08:25

## 2017-08-05 RX ADMIN — Medication 2 TABLET: at 20:42

## 2017-08-05 RX ADMIN — CEFTRIAXONE SODIUM 1 G: 1 INJECTION, SOLUTION INTRAVENOUS at 14:35

## 2017-08-05 RX ADMIN — HYDROCODONE BITARTRATE AND ACETAMINOPHEN 1 TABLET: 5; 325 TABLET ORAL at 05:42

## 2017-08-05 RX ADMIN — BUDESONIDE AND FORMOTEROL FUMARATE DIHYDRATE 2 PUFF: 80; 4.5 AEROSOL RESPIRATORY (INHALATION) at 21:02

## 2017-08-05 RX ADMIN — FUROSEMIDE 40 MG: 40 TABLET ORAL at 08:26

## 2017-08-05 RX ADMIN — BUDESONIDE AND FORMOTEROL FUMARATE DIHYDRATE 2 PUFF: 80; 4.5 AEROSOL RESPIRATORY (INHALATION) at 09:41

## 2017-08-05 RX ADMIN — SODIUM CHLORIDE 125 MG: 9 INJECTION, SOLUTION INTRAVENOUS at 08:26

## 2017-08-05 RX ADMIN — INSULIN LISPRO 2 UNITS: 100 INJECTION, SOLUTION INTRAVENOUS; SUBCUTANEOUS at 12:04

## 2017-08-05 RX ADMIN — HYDROCODONE BITARTRATE AND ACETAMINOPHEN 1 TABLET: 5; 325 TABLET ORAL at 12:04

## 2017-08-05 RX ADMIN — FUROSEMIDE 40 MG: 40 TABLET ORAL at 17:10

## 2017-08-05 RX ADMIN — HYDRALAZINE HYDROCHLORIDE 25 MG: 25 TABLET ORAL at 05:41

## 2017-08-05 RX ADMIN — HYDRALAZINE HYDROCHLORIDE 25 MG: 25 TABLET ORAL at 14:35

## 2017-08-05 RX ADMIN — HYDROCODONE BITARTRATE AND ACETAMINOPHEN 1 TABLET: 5; 325 TABLET ORAL at 20:42

## 2017-08-05 RX ADMIN — DIAZEPAM 2 MG: 2 TABLET ORAL at 09:14

## 2017-08-05 RX ADMIN — HYDROCODONE BITARTRATE AND ACETAMINOPHEN 1 TABLET: 5; 325 TABLET ORAL at 16:24

## 2017-08-05 RX ADMIN — AMLODIPINE BESYLATE 10 MG: 10 TABLET ORAL at 08:26

## 2017-08-05 RX ADMIN — ATORVASTATIN CALCIUM 10 MG: 10 TABLET, FILM COATED ORAL at 08:26

## 2017-08-05 RX ADMIN — HYDRALAZINE HYDROCHLORIDE 25 MG: 25 TABLET ORAL at 20:42

## 2017-08-05 RX ADMIN — Medication 250 MG: at 14:35

## 2017-08-05 RX ADMIN — BISACODYL 5 MG: 5 TABLET, COATED ORAL at 09:14

## 2017-08-05 RX ADMIN — INSULIN LISPRO 2 UNITS: 100 INJECTION, SOLUTION INTRAVENOUS; SUBCUTANEOUS at 22:54

## 2017-08-05 RX ADMIN — INSULIN LISPRO 2 UNITS: 100 INJECTION, SOLUTION INTRAVENOUS; SUBCUTANEOUS at 17:10

## 2017-08-05 NOTE — PLAN OF CARE
Problem: Skin Integrity Impairment, Risk/Actual (Adult)  Goal: Identify Related Risk Factors and Signs and Symptoms  Outcome: Outcome(s) achieved Date Met:  08/05/17  Goal: Skin Integrity/Wound Healing  Outcome: Ongoing (interventions implemented as appropriate)    08/05/17 1429   Skin Integrity Impairment, Risk/Actual (Adult)   Skin Integrity/Wound Healing making progress toward outcome

## 2017-08-05 NOTE — PROGRESS NOTES
[facility] Medicine Services  INPATIENT PROGRESS NOTE    Date of Admission: 8/3/2017  Length of Stay: 2  Primary Care Physician: Trevon Delarosa MD    Subjective   CC: anemia, renal failure  HPI:    Still feels weak in general, worked with PT yesterday  A little anxious when waking up early this morning, but this has been a chronic condition for the patient.  No melena or loose stool, actually feels constipated      Review Of Systems:   Review of Systems   Constitutional: Positive for fatigue.   HENT: Negative.    Eyes: Negative.    Respiratory: Negative.    Cardiovascular: Negative.    Gastrointestinal: Negative.    Endocrine: Negative.    Genitourinary: Negative.    Musculoskeletal: Positive for arthralgias.   Allergic/Immunologic: Negative.    Neurological: Positive for weakness.   Hematological: Negative.    Psychiatric/Behavioral: The patient is nervous/anxious.          Objective      Temp:  [97.3 °F (36.3 °C)] 97.3 °F (36.3 °C)  Heart Rate:  [76-88] 88  Resp:  [16-20] 18  BP: (160-177)/(79-97) 168/82  Physical Exam     Gen-no acute distress, non toxic, lying in bed  HEENT-NCAT, oropharynx clear  CV-RRR, S1 S2 normal, no m/r/g  Resp-CTAB, no wheezes, rhonchi or rales  Abd-soft, non-tender, non-distended, normo active bowel sounds; moribidly obese  Ext-1+ LE edema bilat unchanged from yesterday  Neuro-alert and oriented x 3, speech clear, moves all extremities   Psych-normal affect   Skin- No rash on exposed UE or LE bilaterally      Results Review:    I have reviewed the labs, radiology results and diagnostic studies.      Results from last 7 days  Lab Units 08/05/17  1102   WBC 10*3/mm3 8.66   HEMOGLOBIN g/dL 7.8*   PLATELETS 10*3/mm3 414       Results from last 7 days  Lab Units 08/03/17  1338   SODIUM mmol/L 137   POTASSIUM mmol/L 4.5   CHLORIDE mmol/L 101   CO2 mmol/L 29.0   BUN mg/dL 58*   CREATININE mg/dL 3.70*   GLUCOSE mg/dL 154*   CALCIUM mg/dL 8.9       Culture Data:  Cultures:    Urine Culture   Date Value Ref Range Status   08/03/2017 >100,000 CFU/mL Gram Negative Bacilli (A)  Preliminary       Radiology Data:     I have reviewed the medications.    amLODIPine 10 mg Oral Daily   atorvastatin 10 mg Oral Daily   budesonide-formoterol 2 puff Inhalation BID - RT   ceftriaxone 1 g Intravenous Q24H   ferric gluconate (FERRLECIT) IVPB 125 mg Intravenous Daily   furosemide 40 mg Oral BID   hydrALAZINE 25 mg Oral TID   insulin lispro 0-7 Units Subcutaneous 4x Daily AC & at Bedtime   isosorbide mononitrate 60 mg Oral Daily   Pharmacy Meds to Bed Consult  Does not apply Daily         Assessment/Plan     Problem List  Hospital Problem List     * (Principal)Symptomatic anemia    Asthma    Diabetes mellitus    Hypertension    Morbid obesity    Renal failure    Overview Signed 8/3/2017  4:58 PM by SUELLEN Richey     Unclear whether this is a chronic problem.           Anxiety    Acute on chronic heart failure               Assessment/Plan:    NATALIO on CKD  - baseline unclear, but patient recently seen at [facility] with cr elevated to 4.6 (baseline reported 1.5)  - labs pending this am  - proteinuria  - nephrology evaluation, avoid nephrotoxins  - check renal panel am    Anemia  - chronicity unclear, do not have labs from OSH- will obtain (as well as pcp records)  - patient denies melena or brbpr, no prior endoscopy  - iv iron replacement while hospitalized  - unclear if secondary to CKD vs GI loss  - will need colonoscopy +/- EGD IP vs OP, discussed with GI   - check cbc am    DMII  - a1c 6.0    Morbid obesity    Left leg pain and difficulty ambulating  - per OSH dc summary, duplex LE negative  - continue PT/OT  - OOBTC    UTI (POA)  - rocephin, follow cultures - gram neg rods  - add probiotics    Constipation  - stool softener    VTE proph: mechanical, mobilize (no heparin at present due to anemia of unclear source).    Discussed with Nephrology Dr Nova    Discharge Planning: I expect  patient to be discharged to home vs rehab in 2-3 days.    Hilton Robledo MD   08/05/17   11:24 AM

## 2017-08-05 NOTE — PROGRESS NOTES
"   LOS: 2 days    Patient Care Team:  Trevon Delarosa MD as PCP - General    Reason For Visit:  F/U NATALIO ON CKD  Subjective           Review of Systems:    Pulm: No soa   CV:  No CP      Objective       amLODIPine 10 mg Oral Daily   atorvastatin 10 mg Oral Daily   budesonide-formoterol 2 puff Inhalation BID - RT   ceftriaxone 1 g Intravenous Q24H   ferric gluconate (FERRLECIT) IVPB 125 mg Intravenous Daily   furosemide 40 mg Oral BID   hydrALAZINE 25 mg Oral TID   insulin lispro 0-7 Units Subcutaneous 4x Daily AC & at Bedtime   isosorbide mononitrate 60 mg Oral Daily   Pharmacy Meds to Bed Consult  Does not apply Daily            Vital Signs:  Blood pressure 168/82, pulse 88, temperature 97.3 °F (36.3 °C), temperature source Oral, resp. rate 18, height 64\" (162.6 cm), weight (!) 387 lb 3.2 oz (176 kg), SpO2 97 %.    Flowsheet Rows         First Filed Value    Admission Height  63\" (160 cm) Documented at 08/03/2017 1256    Admission Weight  (!)  385 lb (175 kg) Documented at 08/03/2017 1256          08/04 0701 - 08/05 0700  In: 700 [P.O.:600]  Out: -     Physical Exam:    General Appearance: NAD, alert and cooperative, Ox3  Eyes: PER, conjunctivae and sclerae normal, no icterus  Lungs: respirations regular and unlabored, no crepitus, clear to auscultation  Heart/CV: regular rhythm & normal rate, no murmur, no gallop, no rub and TR edema  Abdomen: not distended, soft, non-tender, no masses,  bowel sounds present. OBESE  Skin: No rash, Warm and dry    Radiology: RENAL U/S PENDING.            Labs:  URINE EOS. CPK. SEROLOGY PENDING. P/C RATIO 1480.    Results from last 7 days  Lab Units 08/03/17  1338   WBC 10*3/mm3 10.56   HEMOGLOBIN g/dL 7.3*   HEMATOCRIT % 24.3*   PLATELETS 10*3/mm3 497*       Results from last 7 days  Lab Units 08/03/17  1338   SODIUM mmol/L 137   POTASSIUM mmol/L 4.5   CHLORIDE mmol/L 101   CO2 mmol/L 29.0   BUN mg/dL 58*   CREATININE mg/dL 3.70*   CALCIUM mg/dL 8.9   ALBUMIN g/dL 3.20 "       Results from last 7 days  Lab Units 08/03/17  1338   GLUCOSE mg/dL 154*         Results from last 7 days  Lab Units 08/03/17  1338   ALK PHOS U/L 104*   BILIRUBIN mg/dL 0.2*   ALT (SGPT) U/L 15   AST (SGOT) U/L 13             Results from last 7 days  Lab Units 08/03/17  1430   COLOR UA  Yellow   CLARITY UA  Cloudy*   PH, URINE  7.5   SPECIFIC GRAVITY, URINE  1.010   GLUCOSE UA  Negative   KETONES UA  Negative   BILIRUBIN UA  Negative   PROTEIN UA  100 mg/dL (2+)*   BLOOD UA  Trace*   LEUKOCYTES UA  Large (3+)*   NITRITE UA  Negative       Estimated Creatinine Clearance: 24.3 mL/min (by C-G formula based on Cr of 3.7).      Assessment     Principal Problem:    Symptomatic anemia  Active Problems:    Asthma    Diabetes mellitus    Hypertension    Morbid obesity    Renal failure    Anxiety    Acute on chronic heart failure            Impression: NATALIO. CKD. NO LAB WORK DONE THE PAST 2 DAYS.            Recommendations: AWAIT LAB AND W/U. ? NEED FOR RENAL BX.      Rocky Nova MD  08/05/17  10:52 AM

## 2017-08-06 LAB
ALBUMIN SERPL-MCNC: 3.1 G/DL (ref 3.2–4.8)
ANION GAP SERPL CALCULATED.3IONS-SCNC: 7 MMOL/L (ref 3–11)
BUN BLD-MCNC: 58 MG/DL (ref 9–23)
BUN/CREAT SERPL: 16.1 (ref 7–25)
CALCIUM SPEC-SCNC: 8.7 MG/DL (ref 8.7–10.4)
CHLORIDE SERPL-SCNC: 100 MMOL/L (ref 99–109)
CO2 SERPL-SCNC: 30 MMOL/L (ref 20–31)
CREAT BLD-MCNC: 3.6 MG/DL (ref 0.6–1.3)
DEPRECATED RDW RBC AUTO: 49.3 FL (ref 37–54)
ERYTHROCYTE [DISTWIDTH] IN BLOOD BY AUTOMATED COUNT: 15.1 % (ref 11.3–14.5)
GFR SERPL CREATININE-BSD FRML MDRD: 15 ML/MIN/1.73
GLUCOSE BLD-MCNC: 107 MG/DL (ref 70–100)
GLUCOSE BLDC GLUCOMTR-MCNC: 130 MG/DL (ref 70–130)
GLUCOSE BLDC GLUCOMTR-MCNC: 136 MG/DL (ref 70–130)
GLUCOSE BLDC GLUCOMTR-MCNC: 187 MG/DL (ref 70–130)
GLUCOSE BLDC GLUCOMTR-MCNC: 213 MG/DL (ref 70–130)
HCT VFR BLD AUTO: 24.2 % (ref 34.5–44)
HGB BLD-MCNC: 7.5 G/DL (ref 11.5–15.5)
MCH RBC QN AUTO: 27.6 PG (ref 27–31)
MCHC RBC AUTO-ENTMCNC: 31 G/DL (ref 32–36)
MCV RBC AUTO: 89 FL (ref 80–99)
PHOSPHATE SERPL-MCNC: 5.7 MG/DL (ref 2.4–5.1)
PLATELET # BLD AUTO: 418 10*3/MM3 (ref 150–450)
PMV BLD AUTO: 9.2 FL (ref 6–12)
POTASSIUM BLD-SCNC: 4.4 MMOL/L (ref 3.5–5.5)
RBC # BLD AUTO: 2.72 10*6/MM3 (ref 3.89–5.14)
SODIUM BLD-SCNC: 137 MMOL/L (ref 132–146)
WBC NRBC COR # BLD: 8.23 10*3/MM3 (ref 3.5–10.8)

## 2017-08-06 PROCEDURE — 94799 UNLISTED PULMONARY SVC/PX: CPT

## 2017-08-06 PROCEDURE — 25010000002 HEPARIN (PORCINE) PER 1000 UNITS: Performed by: INTERNAL MEDICINE

## 2017-08-06 PROCEDURE — 85027 COMPLETE CBC AUTOMATED: CPT | Performed by: INTERNAL MEDICINE

## 2017-08-06 PROCEDURE — 82962 GLUCOSE BLOOD TEST: CPT

## 2017-08-06 PROCEDURE — 25010000002 CEFTRIAXONE PER 250 MG: Performed by: INTERNAL MEDICINE

## 2017-08-06 PROCEDURE — 99233 SBSQ HOSP IP/OBS HIGH 50: CPT | Performed by: INTERNAL MEDICINE

## 2017-08-06 PROCEDURE — 25010000002 NA FERRIC GLUC CPLX PER 12.5 MG: Performed by: INTERNAL MEDICINE

## 2017-08-06 PROCEDURE — 80069 RENAL FUNCTION PANEL: CPT | Performed by: INTERNAL MEDICINE

## 2017-08-06 RX ORDER — HEPARIN SODIUM 5000 [USP'U]/ML
5000 INJECTION, SOLUTION INTRAVENOUS; SUBCUTANEOUS EVERY 12 HOURS SCHEDULED
Status: DISCONTINUED | OUTPATIENT
Start: 2017-08-06 | End: 2017-08-12 | Stop reason: HOSPADM

## 2017-08-06 RX ADMIN — BUDESONIDE AND FORMOTEROL FUMARATE DIHYDRATE 2 PUFF: 80; 4.5 AEROSOL RESPIRATORY (INHALATION) at 09:48

## 2017-08-06 RX ADMIN — DIAZEPAM 2 MG: 2 TABLET ORAL at 10:38

## 2017-08-06 RX ADMIN — HYDROCODONE BITARTRATE AND ACETAMINOPHEN 1 TABLET: 5; 325 TABLET ORAL at 00:55

## 2017-08-06 RX ADMIN — HYDRALAZINE HYDROCHLORIDE 25 MG: 25 TABLET ORAL at 15:26

## 2017-08-06 RX ADMIN — HEPARIN SODIUM 5000 UNITS: 5000 INJECTION, SOLUTION INTRAVENOUS; SUBCUTANEOUS at 20:48

## 2017-08-06 RX ADMIN — BUDESONIDE AND FORMOTEROL FUMARATE DIHYDRATE 2 PUFF: 80; 4.5 AEROSOL RESPIRATORY (INHALATION) at 18:58

## 2017-08-06 RX ADMIN — BISACODYL 5 MG: 5 TABLET, COATED ORAL at 00:52

## 2017-08-06 RX ADMIN — ATORVASTATIN CALCIUM 10 MG: 10 TABLET, FILM COATED ORAL at 08:26

## 2017-08-06 RX ADMIN — DIAZEPAM 2 MG: 2 TABLET ORAL at 20:49

## 2017-08-06 RX ADMIN — FUROSEMIDE 40 MG: 40 TABLET ORAL at 18:03

## 2017-08-06 RX ADMIN — Medication 2 TABLET: at 20:49

## 2017-08-06 RX ADMIN — ISOSORBIDE MONONITRATE 60 MG: 60 TABLET, EXTENDED RELEASE ORAL at 08:26

## 2017-08-06 RX ADMIN — FUROSEMIDE 40 MG: 40 TABLET ORAL at 08:26

## 2017-08-06 RX ADMIN — HYDROCODONE BITARTRATE AND ACETAMINOPHEN 1 TABLET: 5; 325 TABLET ORAL at 06:21

## 2017-08-06 RX ADMIN — HEPARIN SODIUM 5000 UNITS: 5000 INJECTION, SOLUTION INTRAVENOUS; SUBCUTANEOUS at 15:33

## 2017-08-06 RX ADMIN — Medication 250 MG: at 18:03

## 2017-08-06 RX ADMIN — AMLODIPINE BESYLATE 10 MG: 10 TABLET ORAL at 08:26

## 2017-08-06 RX ADMIN — INSULIN LISPRO 2 UNITS: 100 INJECTION, SOLUTION INTRAVENOUS; SUBCUTANEOUS at 20:49

## 2017-08-06 RX ADMIN — SODIUM CHLORIDE 125 MG: 9 INJECTION, SOLUTION INTRAVENOUS at 08:37

## 2017-08-06 RX ADMIN — INSULIN LISPRO 3 UNITS: 100 INJECTION, SOLUTION INTRAVENOUS; SUBCUTANEOUS at 12:15

## 2017-08-06 RX ADMIN — HYDRALAZINE HYDROCHLORIDE 25 MG: 25 TABLET ORAL at 20:49

## 2017-08-06 RX ADMIN — Medication 5 MG: at 21:16

## 2017-08-06 RX ADMIN — Medication 250 MG: at 08:26

## 2017-08-06 RX ADMIN — CEFTRIAXONE SODIUM 1 G: 1 INJECTION, SOLUTION INTRAVENOUS at 15:26

## 2017-08-06 RX ADMIN — HYDRALAZINE HYDROCHLORIDE 25 MG: 25 TABLET ORAL at 05:46

## 2017-08-06 RX ADMIN — BISACODYL 10 MG: 10 SUPPOSITORY RECTAL at 09:42

## 2017-08-06 RX ADMIN — HYDROCODONE BITARTRATE AND ACETAMINOPHEN 1 TABLET: 5; 325 TABLET ORAL at 20:58

## 2017-08-06 RX ADMIN — HYDROCODONE BITARTRATE AND ACETAMINOPHEN 1 TABLET: 5; 325 TABLET ORAL at 11:38

## 2017-08-06 NOTE — PROGRESS NOTES
"   LOS: 3 days    Patient Care Team:  Trevon Delarosa MD as PCP - General    Reason For Visit:  F/U NATALIO ON CKD.  Subjective           Review of Systems:    Pulm: No soa   CV:  No CP      Objective       amLODIPine 10 mg Oral Daily   atorvastatin 10 mg Oral Daily   budesonide-formoterol 2 puff Inhalation BID - RT   ceftriaxone 1 g Intravenous Q24H   ferric gluconate (FERRLECIT) IVPB 125 mg Intravenous Daily   furosemide 40 mg Oral BID   hydrALAZINE 25 mg Oral TID   insulin lispro 0-7 Units Subcutaneous 4x Daily AC & at Bedtime   isosorbide mononitrate 60 mg Oral Daily   Pharmacy Meds to Bed Consult  Does not apply Daily   saccharomyces boulardii 250 mg Oral BID   sennosides-docusate sodium 2 tablet Oral Nightly            Vital Signs:  Blood pressure 163/74, pulse 90, temperature 97.5 °F (36.4 °C), temperature source Oral, resp. rate 16, height 64\" (162.6 cm), weight (!) 333 lb (151 kg), SpO2 93 %.    Flowsheet Rows         First Filed Value    Admission Height  63\" (160 cm) Documented at 08/03/2017 1256    Admission Weight  (!)  385 lb (175 kg) Documented at 08/03/2017 1256          08/05 0701 - 08/06 0700  In: 120 [P.O.:120]  Out: -     Physical Exam:    General Appearance: NAD, alert and cooperative, Ox3  Eyes: PER, conjunctivae and sclerae normal, no icterus  Lungs: respirations regular and unlabored, no crepitus, clear to auscultation  Heart/CV: regular rhythm & normal rate, no murmur, no gallop, no rub and 1+ edema  Abdomen: not distended, soft, non-tender, no masses,  bowel sounds present. OBESE.  Skin: No rash, Warm and dry    Radiology: RENAL U/S UNREMARKABLE.            Labs: CPK 42. [place] AND ANCA PENDING.    Results from last 7 days  Lab Units 08/06/17  0519 08/05/17  1102 08/03/17  1338   WBC 10*3/mm3 8.23 8.66 10.56   HEMOGLOBIN g/dL 7.5* 7.8* 7.3*   HEMATOCRIT % 24.2* 25.3* 24.3*   PLATELETS 10*3/mm3 418 414 497*       Results from last 7 days  Lab Units 08/06/17  0519 08/05/17  1102 08/03/17  1338 " "  SODIUM mmol/L 137 136 137   POTASSIUM mmol/L 4.4 4.5 4.5   CHLORIDE mmol/L 100 98* 101   CO2 mmol/L 30.0 33.0* 29.0   BUN mg/dL 58* 59* 58*   CREATININE mg/dL 3.60* 3.60* 3.70*   CALCIUM mg/dL 8.7 8.8 8.9   PHOSPHORUS mg/dL 5.7* 5.8*  --    ALBUMIN g/dL 3.10* 3.20 3.20       Results from last 7 days  Lab Units 08/06/17  0519   GLUCOSE mg/dL 107*         Results from last 7 days  Lab Units 08/03/17  1338   ALK PHOS U/L 104*   BILIRUBIN mg/dL 0.2*   ALT (SGPT) U/L 15   AST (SGOT) U/L 13             Results from last 7 days  Lab Units 08/03/17  1430   COLOR UA  Yellow   CLARITY UA  Cloudy*   PH, URINE  7.5   SPECIFIC GRAVITY, URINE  1.010   GLUCOSE UA  Negative   KETONES UA  Negative   BILIRUBIN UA  Negative   PROTEIN UA  100 mg/dL (2+)*   BLOOD UA  Trace*   LEUKOCYTES UA  Large (3+)*   NITRITE UA  Negative       Estimated Creatinine Clearance: 22.6 mL/min (by C-G formula based on Cr of 3.6).      Assessment     Principal Problem:    Symptomatic anemia  Active Problems:    Asthma    Diabetes mellitus    Hypertension    Morbid obesity    Renal failure    Anxiety    Acute on chronic heart failure            Impression: NATALIO WITH GFR \" STUCK \". PROTEINURIA IMPROVED FROM WHAT IT WAS AT [facility] LAST MONTH. ANEMIA ON IV FE.            Recommendations: AWAIT PENDING STUDIES. ? NEED FOR A RENAL BX.      Rocky Nova MD  08/06/17  9:37 AM        "

## 2017-08-06 NOTE — PROGRESS NOTES
[facility] Medicine Services  INPATIENT PROGRESS NOTE    Date of Admission: 8/3/2017  Length of Stay: 3  Primary Care Physician: Trevon Delarosa MD    Subjective   CC: anemia, renal failure  HPI:    Had BM after a suppository this am, some back pain after getting into bed. Patient has not yet walked (but did transfer to chair yesterday).      Review Of Systems:   Review of Systems   Constitutional: Positive for fatigue.   HENT: Negative.    Eyes: Negative.    Respiratory: Negative.    Cardiovascular: Negative.    Gastrointestinal: Negative.    Endocrine: Negative.    Genitourinary: Negative.    Musculoskeletal: Positive for arthralgias.   Allergic/Immunologic: Negative.    Neurological: Positive for weakness.   Hematological: Negative.    Psychiatric/Behavioral: The patient is nervous/anxious.          Objective      Temp:  [97.5 °F (36.4 °C)] 97.5 °F (36.4 °C)  Heart Rate:  [] 102  Resp:  [16-20] 17  BP: (152-182)/(73-89) 163/74  Physical Exam     Gen-no acute distress, lying in bed  HEENT-NCAT, oropharynx clear  CV-RRR, S1 S2 normal, no m/r/g  Resp-CTAB, no wheezes, rhonchi or rales  Abd-soft, non-tender, non-distended, normo active bowel sounds; moribidly obese  Ext-1+ LE edema bilaterally  Neuro-alert and oriented x 3, speech clear, moves all extremities   Psych-normal affect   Skin- No rash on exposed UE or LE bilaterally      Results Review:    I have reviewed the labs, radiology results and diagnostic studies.      Results from last 7 days  Lab Units 08/06/17  0519   WBC 10*3/mm3 8.23   HEMOGLOBIN g/dL 7.5*   PLATELETS 10*3/mm3 418       Results from last 7 days  Lab Units 08/06/17  0519   SODIUM mmol/L 137   POTASSIUM mmol/L 4.4   CHLORIDE mmol/L 100   CO2 mmol/L 30.0   BUN mg/dL 58*   CREATININE mg/dL 3.60*   GLUCOSE mg/dL 107*   CALCIUM mg/dL 8.7       Culture Data: Cultures:    Urine Culture   Date Value Ref Range Status   08/03/2017 >100,000 CFU/mL Gram Negative  Bacilli (A)  Preliminary       Radiology Data:     I have reviewed the medications.    amLODIPine 10 mg Oral Daily   atorvastatin 10 mg Oral Daily   budesonide-formoterol 2 puff Inhalation BID - RT   ceftriaxone 1 g Intravenous Q24H   ferric gluconate (FERRLECIT) IVPB 125 mg Intravenous Daily   furosemide 40 mg Oral BID   hydrALAZINE 25 mg Oral TID   insulin lispro 0-7 Units Subcutaneous 4x Daily AC & at Bedtime   isosorbide mononitrate 60 mg Oral Daily   Pharmacy Meds to Bed Consult  Does not apply Daily   saccharomyces boulardii 250 mg Oral BID   sennosides-docusate sodium 2 tablet Oral Nightly         Assessment/Plan     Problem List  Hospital Problem List     * (Principal)Symptomatic anemia    Asthma    Diabetes mellitus    Hypertension    Morbid obesity    Renal failure    Overview Signed 8/3/2017  4:58 PM by SUELLEN Richey     Unclear whether this is a chronic problem.           Anxiety    Acute on chronic heart failure               Assessment/Plan:    NATALIO on CKD  - baseline unclear, but patient recently seen at [facility] with cr elevated to 4.6 (baseline reported 1.5)  - labs pending this am  - proteinuria  - nephrology evaluation, avoid nephrotoxins  - check renal panel am  - possible renal biopsy?    Anemia  - chronicity unclear, do not have labs from OSH- will obtain (as well as pcp records)  - patient denies melena or brbpr, no prior endoscopy  - iv iron replacement while hospitalized  - unclear if secondary to CKD vs GI loss  - will need colonoscopy +/- EGD IP vs OP, discussed with GI   - patient says she would not want to do colonoscopy currently, would like to feel stronger first  - check cbc in am    DMII  - a1c 6.0    Morbid obesity    Left leg pain and difficulty ambulating  - per OSH dc summary, duplex LE negative  - continue PT/OT  - OOBTC    UTI (POA), Ecoli  - continue rocephin  - probiotics    Constipation  - stool softener  - bm today    VTE proph: mechanical, will add q12h subq heparin  as patient at increased risk of VTE given her relative immobility at present and monitor hemoglobin levels due to her anemia.    Discharge Planning: I expect patient to be discharged to home vs rehab in 2-3 days.    Hilton Robledo MD   08/06/17   12:32 PM

## 2017-08-06 NOTE — PLAN OF CARE
Problem: Skin Integrity Impairment, Risk/Actual (Adult)  Goal: Skin Integrity/Wound Healing  Outcome: Ongoing (interventions implemented as appropriate)    08/06/17 4761   Skin Integrity Impairment, Risk/Actual (Adult)   Skin Integrity/Wound Healing making progress toward outcome

## 2017-08-07 LAB
ALBUMIN SERPL-MCNC: 3.1 G/DL (ref 3.2–4.8)
ANA SER QL: NEGATIVE
ANION GAP SERPL CALCULATED.3IONS-SCNC: 11 MMOL/L (ref 3–11)
BUN BLD-MCNC: 57 MG/DL (ref 9–23)
BUN/CREAT SERPL: 15.8 (ref 7–25)
CALCIUM SPEC-SCNC: 8.6 MG/DL (ref 8.7–10.4)
CHLORIDE SERPL-SCNC: 102 MMOL/L (ref 99–109)
CO2 SERPL-SCNC: 28 MMOL/L (ref 20–31)
CREAT BLD-MCNC: 3.6 MG/DL (ref 0.6–1.3)
DEPRECATED RDW RBC AUTO: 50 FL (ref 37–54)
ERYTHROCYTE [DISTWIDTH] IN BLOOD BY AUTOMATED COUNT: 15.1 % (ref 11.3–14.5)
GFR SERPL CREATININE-BSD FRML MDRD: 15 ML/MIN/1.73
GLUCOSE BLD-MCNC: 138 MG/DL (ref 70–100)
GLUCOSE BLDC GLUCOMTR-MCNC: 134 MG/DL (ref 70–130)
GLUCOSE BLDC GLUCOMTR-MCNC: 171 MG/DL (ref 70–130)
GLUCOSE BLDC GLUCOMTR-MCNC: 176 MG/DL (ref 70–130)
GLUCOSE BLDC GLUCOMTR-MCNC: 184 MG/DL (ref 70–130)
HCT VFR BLD AUTO: 25.5 % (ref 34.5–44)
HGB BLD-MCNC: 7.6 G/DL (ref 11.5–15.5)
MCH RBC QN AUTO: 27 PG (ref 27–31)
MCHC RBC AUTO-ENTMCNC: 29.8 G/DL (ref 32–36)
MCV RBC AUTO: 90.4 FL (ref 80–99)
PHOSPHATE SERPL-MCNC: 5.4 MG/DL (ref 2.4–5.1)
PLATELET # BLD AUTO: 425 10*3/MM3 (ref 150–450)
PMV BLD AUTO: 9.4 FL (ref 6–12)
POTASSIUM BLD-SCNC: 4.1 MMOL/L (ref 3.5–5.5)
RBC # BLD AUTO: 2.82 10*6/MM3 (ref 3.89–5.14)
SODIUM BLD-SCNC: 141 MMOL/L (ref 132–146)
WBC NRBC COR # BLD: 7.78 10*3/MM3 (ref 3.5–10.8)

## 2017-08-07 PROCEDURE — 99232 SBSQ HOSP IP/OBS MODERATE 35: CPT | Performed by: INTERNAL MEDICINE

## 2017-08-07 PROCEDURE — 82962 GLUCOSE BLOOD TEST: CPT

## 2017-08-07 PROCEDURE — 85027 COMPLETE CBC AUTOMATED: CPT | Performed by: INTERNAL MEDICINE

## 2017-08-07 PROCEDURE — 97530 THERAPEUTIC ACTIVITIES: CPT

## 2017-08-07 PROCEDURE — 25010000002 HEPARIN (PORCINE) PER 1000 UNITS: Performed by: INTERNAL MEDICINE

## 2017-08-07 PROCEDURE — 25010000002 NA FERRIC GLUC CPLX PER 12.5 MG: Performed by: INTERNAL MEDICINE

## 2017-08-07 PROCEDURE — 80069 RENAL FUNCTION PANEL: CPT | Performed by: INTERNAL MEDICINE

## 2017-08-07 PROCEDURE — 97110 THERAPEUTIC EXERCISES: CPT

## 2017-08-07 PROCEDURE — 25010000002 CEFTRIAXONE PER 250 MG: Performed by: INTERNAL MEDICINE

## 2017-08-07 PROCEDURE — 94799 UNLISTED PULMONARY SVC/PX: CPT

## 2017-08-07 RX ADMIN — HYDRALAZINE HYDROCHLORIDE 25 MG: 25 TABLET ORAL at 05:22

## 2017-08-07 RX ADMIN — HYDROCODONE BITARTRATE AND ACETAMINOPHEN 1 TABLET: 5; 325 TABLET ORAL at 20:20

## 2017-08-07 RX ADMIN — Medication 250 MG: at 08:48

## 2017-08-07 RX ADMIN — SODIUM CHLORIDE 125 MG: 9 INJECTION, SOLUTION INTRAVENOUS at 08:48

## 2017-08-07 RX ADMIN — Medication 2 TABLET: at 20:21

## 2017-08-07 RX ADMIN — BUDESONIDE AND FORMOTEROL FUMARATE DIHYDRATE 2 PUFF: 80; 4.5 AEROSOL RESPIRATORY (INHALATION) at 20:14

## 2017-08-07 RX ADMIN — INSULIN LISPRO 2 UNITS: 100 INJECTION, SOLUTION INTRAVENOUS; SUBCUTANEOUS at 12:25

## 2017-08-07 RX ADMIN — HEPARIN SODIUM 5000 UNITS: 5000 INJECTION, SOLUTION INTRAVENOUS; SUBCUTANEOUS at 20:20

## 2017-08-07 RX ADMIN — Medication 5 MG: at 22:28

## 2017-08-07 RX ADMIN — INSULIN LISPRO 2 UNITS: 100 INJECTION, SOLUTION INTRAVENOUS; SUBCUTANEOUS at 18:43

## 2017-08-07 RX ADMIN — DIAZEPAM 2 MG: 2 TABLET ORAL at 22:28

## 2017-08-07 RX ADMIN — ALBUTEROL SULFATE 2.5 MG: 2.5 SOLUTION RESPIRATORY (INHALATION) at 00:40

## 2017-08-07 RX ADMIN — HYDRALAZINE HYDROCHLORIDE 25 MG: 25 TABLET ORAL at 20:21

## 2017-08-07 RX ADMIN — FUROSEMIDE 40 MG: 40 TABLET ORAL at 18:43

## 2017-08-07 RX ADMIN — Medication 250 MG: at 18:44

## 2017-08-07 RX ADMIN — ISOSORBIDE MONONITRATE 60 MG: 60 TABLET, EXTENDED RELEASE ORAL at 08:48

## 2017-08-07 RX ADMIN — HYDRALAZINE HYDROCHLORIDE 25 MG: 25 TABLET ORAL at 14:14

## 2017-08-07 RX ADMIN — INSULIN LISPRO 2 UNITS: 100 INJECTION, SOLUTION INTRAVENOUS; SUBCUTANEOUS at 20:20

## 2017-08-07 RX ADMIN — HYDROCODONE BITARTRATE AND ACETAMINOPHEN 1 TABLET: 5; 325 TABLET ORAL at 08:49

## 2017-08-07 RX ADMIN — CEFTRIAXONE SODIUM 1 G: 1 INJECTION, SOLUTION INTRAVENOUS at 16:13

## 2017-08-07 RX ADMIN — FUROSEMIDE 40 MG: 40 TABLET ORAL at 08:48

## 2017-08-07 RX ADMIN — ATORVASTATIN CALCIUM 10 MG: 10 TABLET, FILM COATED ORAL at 08:48

## 2017-08-07 RX ADMIN — DIAZEPAM 2 MG: 2 TABLET ORAL at 14:14

## 2017-08-07 RX ADMIN — HEPARIN SODIUM 5000 UNITS: 5000 INJECTION, SOLUTION INTRAVENOUS; SUBCUTANEOUS at 08:49

## 2017-08-07 RX ADMIN — AMLODIPINE BESYLATE 10 MG: 10 TABLET ORAL at 08:48

## 2017-08-07 RX ADMIN — BUDESONIDE AND FORMOTEROL FUMARATE DIHYDRATE 2 PUFF: 80; 4.5 AEROSOL RESPIRATORY (INHALATION) at 09:36

## 2017-08-07 NOTE — THERAPY TREATMENT NOTE
Acute Care - Physical Therapy Treatment Note  [facility]     Patient Name: Joy Bueno  : 1950  MRN: [id number]  Today's Date: 2017  Onset of Illness/Injury or Date of Surgery Date: 17  Date of Referral to PT: 17  Referring Physician: SUELLEN Fischer    Admit Date: 8/3/2017    Visit Dx:    ICD-10-CM ICD-9-CM   1. Renal failure N19 586   2. Symptomatic anemia D64.9 285.9   3. Acute cystitis without hematuria N30.00 595.0   4. Morbid obesity due to excess calories E66.01 278.01   5. Adult failure to thrive syndrome R62.7 783.7   6. Impaired functional mobility, balance, gait, and endurance Z74.09 V49.89     Patient Active Problem List   Diagnosis   • Asthma   • Diabetes mellitus   • Hypertension   • Symptomatic anemia   • Morbid obesity   • Renal failure   • Anxiety   • Acute on chronic heart failure               Adult Rehabilitation Note       17 0802          Rehab Assessment/Intervention    Discipline physical therapy assistant  -UD      Document Type therapy note (daily note)  -UD      Subjective Information agree to therapy;complains of;weakness  -UD      Patient Effort, Rehab Treatment adequate  -UD      Symptoms Noted During/After Treatment increased pain;fatigue  -UD      Precautions/Limitations fall precautions  -UD      Specific Treatment Considerations gets panic attacks  -UD      Recorded by [UD] Yazmin Good PTA      Vital Signs    O2 Delivery Post Treatment room air  -UD      Pre Patient Position Supine  -UD      Intra Patient Position Sitting  -UD      Post Patient Position Sitting  -UD      Recorded by [UD] Yazmin Good PTA      Pain Assessment    Pain Assessment No/denies pain  -UD      Pain Score 0  -UD      Post Pain Score 8  -UD      Pain Type Chronic pain  -UD      Pain Location Knee  -UD      Pain Orientation Left  -UD      Pain Intervention(s) Repositioned  -UD      Recorded by [UD] Yazmin Good PTA      Cognitive Assessment/Intervention    Orientation Status  oriented x 4  -UD      Follows Commands/Answers Questions 100% of the time  -UD      Personal Safety Interventions fall prevention program maintained  -UD      Recorded by [UD] Yazmin Good PTA      Bed Mobility, Assessment/Treatment    Bed Mobility, Roll Left, [place] minimum assist (75% patient effort)  -UD      Bed Mobility, Roll Right, [place] minimum assist (75% patient effort)  -UD      Bed Mob, Supine to Sit, [place] not tested   pt refused to try  -UD      Recorded by [UD] Yazmin Good PTA      Transfer Assessment/Treatment    Transfers, Bed-Chair [place] unable to perform  -UD      Transfers, Bed-Chair-Bed, Assist Device mechanical lift  -UD      Transfers, Sit-Stand [place] unable to perform  -UD      Transfers, Stand-Sit [place] dependent (less than 25% patient effort)  -UD      Recorded by [UD] Yazmin Good PTA      Gait Assessment/Treatment    Gait, [place] Level not appropriate to assess  -UD      Recorded by [UD] Yazmin Good PTA      Therapy Exercises    Bilateral Lower Extremities AROM:;10 reps;sitting;supine  -UD      Recorded by [UD] Yazmin Good PTA      Positioning and Restraints    Pre-Treatment Position in bed  -UD      Post Treatment Position chair  -UD      In Chair notified nsg;reclined;sitting;call light within reach;exit alarm on;legs elevated  -UD      Recorded by [UD] Yazmin Good PTA        User Key  (r) = Recorded By, (t) = Taken By, (c) = Cosigned By    Initials Name Effective Dates    UD Yazmin Good PTA 06/22/15 -                 IP PT Goals       08/07/17 0853 08/04/17 1103       Bed Mobility PT LTG    Bed Mobility PT LTG, Date Established  08/04/17  -     Bed Mobility PT LTG, Time to Achieve  2 wks  -     Bed Mobility PT LTG, Activity Type  supine to sit/sit to supine  -     Bed Mobility PT LTG, [place] Level  moderate assist (50% patient effort);2 person assist required  -     Bed Mobility PT LTG, Outcome goal ongoing  -UD       Transfer Training PT LTG    Transfer Training PT LTG, Date Established  08/04/17  -EH     Transfer Training PT LTG, Time to Achieve  2 wks  -EH     Transfer Training PT LTG, Activity Type  sit to stand/stand to sit  -EH     Transfer Training PT LTG, [place] Level  moderate assist (50% patient effort);2 person assist required  -EH     Transfer Training PT LTG, Assist Device  walker, rolling  -EH     Transfer Training PT LTG, Outcome goal ongoing  -UD      Gait Training PT LTG    Gait Training Goal PT LTG, Date Established  08/04/17  -EH     Gait Training Goal PT LTG, Time to Achieve  2 wks  -EH     Gait Training Goal PT LTG, [place] Level  moderate assist (50% patient effort);2 person assist required  -EH     Gait Training Goal PT LTG, Assist Device  walker, rolling  -EH     Gait Training Goal PT LTG, Distance to Achieve  10  -EH     Gait Training Goal PT LTG, Outcome goal ongoing  -UD        User Key  (r) = Recorded By, (t) = Taken By, (c) = Cosigned By    Initials Name Provider Type    ADRIANE Good, PTA Physical Therapy Assistant     Justa Guerra, PT Physical Therapist          Physical Therapy Education     Title: PT OT SLP Therapies (Active)     Topic: Physical Therapy (Done)     Point: Mobility training (Done)    Learning Progress Summary    Learner Readiness Method Response Comment Documented by Status   Patient Acceptance VENESSA FRANKLIN,NR   08/07/17 0853 Done               Point: Home exercise program (Done)    Learning Progress Summary    Learner Readiness Method Response Comment Documented by Status   Patient Acceptance VENESSA FRANKLIN,JOVITA   08/07/17 0853 Done               Point: Body mechanics (Done)    Learning Progress Summary    Learner Readiness Method Response Comment Documented by Status   Patient Acceptance VENESSA FRANKLINNR   08/07/17 0853 Done               Point: Precautions (Done)    Learning Progress Summary    Learner Readiness Method Response Comment Documented by Status   Patient  Acceptance E,D JOVITA MOSES   08/07/17 0853 Done    Acceptance E NR Pt educated on POC and need for OOB mobility to prevent functional decline, PNA. Pt continues to refuse mobility assessment  08/04/17 1101 Active                      User Key     Initials Effective Dates Name Provider Type Discipline     06/22/15 -  Yazmin Good, PTA Physical Therapy Assistant PT     06/19/15 -  Justa Guerra, PT Physical Therapist PT                    PT Recommendation and Plan  Anticipated Discharge Disposition: skilled nursing facility  PT Frequency: daily  Plan of Care Review  Plan Of Care Reviewed With: patient  Progress: progress towards functional goals is fair  Outcome Summary/Follow up Plan: pt gets very anxious,tried to keep her calm.refused to try sitting on side of bed.oob via lift.attempted standing unable to .will need rehab          Outcome Measures       08/07/17 0802 08/04/17 1020 08/04/17 1018    How much help from another person do you currently need...    Turning from your back to your side while in flat bed without using bedrails? 3  -UD 2  -EH     Moving from lying on back to sitting on the side of a flat bed without bedrails? 1  -UD 2  -EH     Moving to and from a bed to a chair (including a wheelchair)? 1  -UD 2  -EH     Standing up from a chair using your arms (e.g., wheelchair, bedside chair)? 1  -UD 2  -EH     Climbing 3-5 steps with a railing? 1  -UD 1  -EH     To walk in hospital room? 1  -UD 2  -EH     AM-PAC 6 Clicks Score 8  -UD 11  -EH     How much help from another is currently needed...    Putting on and taking off regular lower body clothing?   1  -AC    Bathing (including washing, rinsing, and drying)   1  -AC    Toileting (which includes using toilet bed pan or urinal)   2  -AC    Putting on and taking off regular upper body clothing   2  -AC    Taking care of personal grooming (such as brushing teeth)   2  -AC    Eating meals   3  -AC    Score   11  -AC    Functional Assessment     Outcome Measure Options  AM-PAC 6 Clicks Basic Mobility (PT)  - AM-PAC 6 Clicks Daily Activity (OT)  -      User Key  (r) = Recorded By, (t) = Taken By, (c) = Cosigned By    Initials Name Provider Type    AC Mirna Orellana, OT Occupational Therapist    ADRIANE Good PTA Physical Therapy Assistant     Justa Guerra, PT Physical Therapist           Time Calculation:         PT Charges       08/07/17 0856          Time Calculation    PT Received On 08/07/17  -      PT Goal Re-Cert Due Date 08/14/17  -      Time Calculation- PT    Total Timed Code Minutes- PT 18 minute(s)  -        User Key  (r) = Recorded By, (t) = Taken By, (c) = Cosigned By    Initials Name Provider Type    ADRIANE Good PTA Physical Therapy Assistant          Therapy Charges for Today     Code Description Service Date Service Provider Modifiers Qty    [id number] HC PT THER PROC EA 15 MIN 8/7/2017 Yazmin Good PTA GP 1          PT G-Codes  Outcome Measure Options: AM-PAC 6 Clicks Basic Mobility (PT)    Yazmin Good PTA  8/7/2017

## 2017-08-07 NOTE — PLAN OF CARE
Problem: Patient Care Overview (Adult)  Goal: Plan of Care Review  Outcome: Ongoing (interventions implemented as appropriate)    08/07/17 1043   Coping/Psychosocial Response Interventions   Plan Of Care Reviewed With patient   Outcome Evaluation   Outcome Summary/Follow up Plan Pt limited by pain, fatigue and anxiety. Pt declined EOB activity; lifted to chair, and attempted 2 stand x 2, but pt unable to clear bottom from chair. Pt completed 1 set 15 reps UE AROM given multiple cues to stay on task.          Problem: Inpatient Occupational Therapy  Goal: Bed Mobility Goal LTG- OT  Outcome: Ongoing (interventions implemented as appropriate)    08/04/17 1059 08/07/17 1043   Bed Mobility OT LTG   Bed Mobility OT LTG, Date Established 08/04/17 --    Bed Mobility OT LTG, Time to Achieve by discharge --    Bed Mobility OT LTG, Activity Type supine to sit/sit to supine --    Bed Mobility OT LTG, [place] Level moderate assist (50% patient effort);2 person assist required --    Bed Mobility OT LTG, Assist Device bed rails --    Bed Mobility OT LTG, Outcome --  goal ongoing       Goal: Transfer Training Goal 1 LTG- OT  Outcome: Ongoing (interventions implemented as appropriate)    08/04/17 1059 08/07/17 1043   Transfer Training OT LTG   Transfer Training OT LTG, Date Established 08/04/17 --    Transfer Training OT LTG, Time to Achieve by discharge --    Transfer Training OT LTG, Activity Type sit to stand/stand to sit;bed to chair /chair to bed;toilet --    Transfer Training OT LTG, [place] Level moderate assist (50% patient effort);2 person assist required --    Transfer Training OT LTG, Assist Device walker, rolling --    Transfer Training OT LTG, Outcome --  goal ongoing  (unable to perform)       Goal: Strength Goal LTG- OT  Outcome: Ongoing (interventions implemented as appropriate)    08/07/17 1043   Strength OT LTG   Strength Goal OT LTG, Outcome goal ongoing  (completed 1 set x 15)       Goal: Dynamic  Sitting Balance Goal LTG- OT  Outcome: Ongoing (interventions implemented as appropriate)    08/04/17 1059 08/07/17 1043   Dynamic Sitting Balance OT LTG   Dynamic Sitting Balance OT LTG, Date Established 08/04/17 --    Dynamic Sitting Balance OT LTG, Time to Achieve by discharge --    Dynamic Sitting Balance OT LTG, [place] contact guard assist --    Dynamic Sitting Balance OT LTG, Assist Device UE Support --    Dynamic Sitting Balance OT LTG, Outcome --  goal ongoing       Goal: Grooming Goal LTG- OT  Outcome: Ongoing (interventions implemented as appropriate)    08/07/17 1043   Grooming OT LTG   Grooming Goal OT LTG, Outcome goal partially met  (met for brushing teeth)

## 2017-08-07 NOTE — THERAPY TREATMENT NOTE
Acute Care - Occupational Therapy Treatment Note  [facility]     Patient Name: Joy Bueno  : 1950  MRN: [id number]  Today's Date: 2017  Onset of Illness/Injury or Date of Surgery Date: 17  Date of Referral to OT: 17  Referring Physician: SUELLEN Fischer      Admit Date: 8/3/2017    Visit Dx:     ICD-10-CM ICD-9-CM   1. Renal failure N19 586   2. Symptomatic anemia D64.9 285.9   3. Acute cystitis without hematuria N30.00 595.0   4. Morbid obesity due to excess calories E66.01 278.01   5. Adult failure to thrive syndrome R62.7 783.7   6. Impaired functional mobility, balance, gait, and endurance Z74.09 V49.89     Patient Active Problem List   Diagnosis   • Asthma   • Diabetes mellitus   • Hypertension   • Symptomatic anemia   • Morbid obesity   • Renal failure   • Anxiety   • Acute on chronic heart failure             Adult Rehabilitation Note       17 0803 17 0802       Rehab Assessment/Intervention    Discipline occupational therapist  -AC physical therapy assistant  -UD     Document Type therapy note (daily note)  -AC therapy note (daily note)  -UD     Subjective Information agree to therapy;complains of;weakness   anxiety  -AC agree to therapy;complains of;weakness  -UD     Patient Effort, Rehab Treatment fair  -AC adequate  -UD     Symptoms Noted During/After Treatment increased pain;fatigue  -AC increased pain;fatigue  -UD     Precautions/Limitations fall precautions  -AC fall precautions  -UD     Specific Treatment Considerations pt reports she has axiety attacks, and pt showing signs of anxiety and reporting anxiety throughtout tx  -AC gets panic attacks  -UD     Recorded by [AC] Mirna Orellana, OT [UD] Yazmin Good PTA     Vital Signs    O2 Delivery Post Treatment  room air  -UD     Pre Patient Position  Supine  -UD     Intra Patient Position  Sitting  -UD     Post Patient Position  Sitting  -UD     Recorded by  [UD] Yazmin Good PTA     Pain Assessment    Pain  Assessment 0-10  -AC No/denies pain  -UD     Pain Score 0  -AC 0  -UD     Post Pain Score 8  -AC 8  -UD     Pain Type Chronic pain  -AC Chronic pain  -UD     Pain Location Knee  -AC Knee  -UD     Pain Orientation Left  -AC Left  -UD     Pain Intervention(s) Repositioned  -AC Repositioned  -UD     Recorded by [AC] Mirna Orellana, OT [UD] Yazmin Good PTA     Cognitive Assessment/Intervention    Current Cognitive/Communication Assessment --   mild confusion  -AC      Orientation Status oriented x 4  -AC oriented x 4  -UD     Follows Commands/Answers Questions 100% of the time  -% of the time  -UD     Personal Safety mild impairment  -AC      Personal Safety Interventions fall prevention program maintained;gait belt;nonskid shoes/slippers when out of bed  -AC fall prevention program maintained  -UD     Recorded by [AC] Mirna Orellana, OT [UD] Yazmin Good PTA     Bed Mobility, Assessment/Treatment    Bed Mobility, Roll Left, [place] minimum assist (75% patient effort)  -AC minimum assist (75% patient effort)  -UD     Bed Mobility, Roll Right, [place] minimum assist (75% patient effort)  -AC minimum assist (75% patient effort)  -UD     Bed Mob, Supine to Sit, [place]  not tested   pt refused to try  -UD     Bed Mobility, Comment rolled for sling placment; pt declined to sit EOB  -AC      Recorded by [AC] Mirna Orellana, OT [UD] Yazmin Good, SHANA     Transfer Assessment/Treatment    Transfers, Bed-Chair [place] dependent (less than 25% patient effort)  -AC unable to perform  -UD     Transfers, Bed-Chair-Bed, Assist Device mechanical lift  -AC mechanical lift  -UD     Transfers, Sit-Stand [place] unable to perform  -AC unable to perform  -UD     Transfers, Stand-Sit [place] dependent (less than 25% patient effort)  -AC dependent (less than 25% patient effort)  -UD     Transfer, Comment 2 attempts sSTS, pt unable to clear bottom from chair  -AC      Recorded by [AC] Mirna Orellana, OT [UD]  Yazmin Good PTA     Gait Assessment/Treatment    Gait, [place] Level  not appropriate to assess  -UD     Recorded by  [UD] Yazmin Good PTA     Functional Mobility    Functional Mobility- Ind. Level unable to perform  -AC      Recorded by [AC] Mirna Orellana OT      Grooming Assessment/Training    Grooming Assess/Train, Position sitting  -AC      Grooming Assess/Train, Indepen Level set up required  -AC      Grooming Assess/Train, Comment brush teeth  -AC      Recorded by [AC] Mirna Orellana OT      Therapy Exercises    Bilateral Lower Extremities  AROM:;10 reps;sitting;supine  -UD     Bilateral Upper Extremity AROM:;15 reps;elbow flexion/extension;hand pumps;shoulder extension/flexion;shoulder horizontal abd/add   multiple cues to complete reps  -AC      Recorded by [AC] Mirna Orellana OT [UD] Yazmin Good PTA     Positioning and Restraints    Pre-Treatment Position in bed  -AC in bed  -UD     Post Treatment Position chair  -AC chair  -UD     In Chair reclined;call light within reach;encouraged to call for assist;exit alarm on;on mechanical lift sling  -AC notified nsg;reclined;sitting;call light within reach;exit alarm on;legs elevated  -UD     Recorded by [AC] Mirna Orellana, OT [UD] Yazmin Good PTA       User Key  (r) = Recorded By, (t) = Taken By, (c) = Cosigned By    Initials Name Effective Dates     Mirna Orellana OT 06/23/15 -     UD Yazmin Good PTA 06/22/15 -                 OT Goals       08/07/17 1043 08/04/17 1059       Bed Mobility OT LTG    Bed Mobility OT LTG, Date Established  08/04/17  -AC     Bed Mobility OT LTG, Time to Achieve  by discharge  -AC     Bed Mobility OT LTG, Activity Type  supine to sit/sit to supine  -AC     Bed Mobility OT LTG, [place] Level  moderate assist (50% patient effort);2 person assist required  -AC     Bed Mobility OT LTG, Assist Device  bed rails  -AC     Bed Mobility OT LTG, Outcome goal ongoing  -AC      Transfer Training OT LTG    Transfer Training  OT LTG, Date Established  08/04/17  -AC     Transfer Training OT LTG, Time to Achieve  by discharge  -AC     Transfer Training OT LTG, Activity Type  sit to stand/stand to sit;bed to chair /chair to bed;toilet  -AC     Transfer Training OT LTG, [place] Level  moderate assist (50% patient effort);2 person assist required  -AC     Transfer Training OT LTG, Assist Device  walker, rolling  -AC     Transfer Training OT LTG, Outcome goal ongoing   unable to perform  -AC      Strength OT LTG    Strength Goal OT LTG, Date Established  08/04/17  -AC     Strength Goal OT LTG, Time to Achieve  by discharge  -AC     Strength Goal OT LTG, Measure to Achieve  Pt will complete 2 sets x 15 BUE AROM as needed to support ADLs  -AC     Strength Goal OT LTG, Outcome goal ongoing   completed 1 set x 15  -AC      Dynamic Sitting Balance OT LTG    Dynamic Sitting Balance OT LTG, Date Established  08/04/17  -AC     Dynamic Sitting Balance OT LTG, Time to Achieve  by discharge  -AC     Dynamic Sitting Balance OT LTG, [place] Level  contact guard assist  -AC     Dynamic Sitting Balance OT LTG, Assist Device  UE Support  -AC     Dynamic Sitting Balance OT LTG, Outcome goal ongoing  -AC      Grooming OT LTG    Grooming Goal OT LTG, Date Established  08/04/17  -AC     Grooming Goal OT LTG, Time to Achieve  by discharge  -AC     Grooming Goal OT LTG, Activity Type  Pt will wash face and brush teeth after setup  -AC     Grooming Goal OT LTG, [place] Level  set up required  -AC     Grooming Goal OT LTG, Outcome goal partially met   met for brushing teeth  -AC        User Key  (r) = Recorded By, (t) = Taken By, (c) = Cosigned By    Initials Name Provider Type    ROSA Orellana OT Occupational Therapist          Occupational Therapy Education     Title: PT OT SLP Therapies (Active)     Topic: Occupational Therapy (Active)     Point: ADL training (Active)    Description: Instruct learner(s) on proper safety adaptation and  remediation techniques during self care or transfers.   Instruct in proper use of assistive devices.    Learning Progress Summary    Learner Readiness Method Response Comment Documented by Status   Patient Acceptance E NR benefits of actiivty, relaxation techniques for anxiety  08/07/17 1042 Active    Acceptance E NR Benefits of activity ( pt agreeable to in bed activity); role of OT  08/04/17 1058 Active                      User Key     Initials Effective Dates Name Provider Type Discipline     06/23/15 -  Mirna Orellana, OT Occupational Therapist OT                  OT Recommendation and Plan  Anticipated Discharge Disposition: skilled nursing facility  Planned Therapy Interventions: ADL retraining, balance training, bed mobility training, strengthening, transfer training  Therapy Frequency: daily  Plan of Care Review  Plan Of Care Reviewed With: patient  Outcome Summary/Follow up Plan: Pt limited by pain, fatigue and anxiety.  Pt declined EOB activity; lifted to chair, and attempted 2 stand x 2, but pt unable to clear bottom from chair. Pt completed 1 set 15 reps UE AROM given multiple cues to stay on task.          Outcome Measures       08/07/17 0803 08/07/17 0802       How much help from another person do you currently need...    Turning from your back to your side while in flat bed without using bedrails?  3  -UD     Moving from lying on back to sitting on the side of a flat bed without bedrails?  1  -UD     Moving to and from a bed to a chair (including a wheelchair)?  1  -UD     Standing up from a chair using your arms (e.g., wheelchair, bedside chair)?  1  -UD     Climbing 3-5 steps with a railing?  1  -UD     To walk in hospital room?  1  -UD     AM-PAC 6 Clicks Score  8  -UD     How much help from another is currently needed...    Putting on and taking off regular lower body clothing? 1  -AC      Bathing (including washing, rinsing, and drying) 1  -AC      Toileting (which includes using toilet bed  pan or urinal) 1  -AC      Putting on and taking off regular upper body clothing 2  -AC      Taking care of personal grooming (such as brushing teeth) 3  -AC      Eating meals 3  -AC      Score 11  -      Functional Assessment    Outcome Measure Options AM-PAC 6 Clicks Daily Activity (OT)  -AC        User Key  (r) = Recorded By, (t) = Taken By, (c) = Cosigned By    Initials Name Provider Type     Mirna Orellana OT Occupational Therapist    ADRIANE Good, PTA Physical Therapy Assistant           Time Calculation:         Time Calculation- OT       08/07/17 1049          Time Calculation- OT    OT Start Time 0803  -      Total Timed Code Minutes- OT 15 minute(s)  -      OT Received On 08/07/17  -      OT Goal Re-Cert Due Date 08/14/17  -        User Key  (r) = Recorded By, (t) = Taken By, (c) = Cosigned By    Initials Name Provider Type     Mirna Orellana OT Occupational Therapist           Therapy Charges for Today     Code Description Service Date Service Provider Modifiers Qty    [id number]  OT THERAPEUTIC ACT EA 15 MIN 8/7/2017 Mirna Orellana OT GO 1               Mirna Orellana OT  8/7/2017

## 2017-08-07 NOTE — PROGRESS NOTES
[facility] Medicine Services  INPATIENT PROGRESS NOTE    Date of Admission: 8/3/2017  Length of Stay: 4  Primary Care Physician: Trevon Delarosa MD    Subjective   CC: anemia, renal failure  HPI:    Patient says she had a panic attack this morning at 5am, better now. Denies pain. Didn't really get out of bed yesterday, I encouraged her to work with PT/OT today.      Review Of Systems:   Review of Systems   Constitutional: Positive for fatigue.   HENT: Negative.    Eyes: Negative.    Respiratory: Negative.    Cardiovascular: Negative.    Gastrointestinal: Negative.    Endocrine: Negative.    Genitourinary: Negative.    Musculoskeletal: Positive for arthralgias.   Allergic/Immunologic: Negative.    Neurological: Positive for weakness.   Hematological: Negative.    Psychiatric/Behavioral: The patient is nervous/anxious.          Objective      Temp:  [97.5 °F (36.4 °C)-98.2 °F (36.8 °C)] 98.2 °F (36.8 °C)  Heart Rate:  [] 87  Resp:  [17-22] 22  BP: (140-177)/(68-87) 157/77  Physical Exam     Gen-no acute distress, lying in bed  HEENT-NCAT, oropharynx clear  CV-RRR, S1 S2 normal, no m/r/g  Resp-CTAB, no wheezes, rhonchi or rales  Abd-soft non tender, non distended, normal bowel sounds, morbidly obese  Ext-1+ LE edema bilaterally  Neuro-alert and oriented x 3, speech clear, moves all extremities   Psych-normal affect   Skin- No rash on exposed UE or LE bilaterally      Results Review:    I have reviewed the labs, radiology results and diagnostic studies.      Results from last 7 days  Lab Units 08/07/17  0332   WBC 10*3/mm3 7.78   HEMOGLOBIN g/dL 7.6*   PLATELETS 10*3/mm3 425       Results from last 7 days  Lab Units 08/07/17  0332   SODIUM mmol/L 141   POTASSIUM mmol/L 4.1   CHLORIDE mmol/L 102   CO2 mmol/L 28.0   BUN mg/dL 57*   CREATININE mg/dL 3.60*   GLUCOSE mg/dL 138*   CALCIUM mg/dL 8.6*       Culture Data: Cultures:    Urine Culture   Date Value Ref Range Status   08/03/2017  >100,000 CFU/mL Gram Negative Bacilli (A)  Preliminary       Radiology Data:     I have reviewed the medications.    amLODIPine 10 mg Oral Daily   atorvastatin 10 mg Oral Daily   budesonide-formoterol 2 puff Inhalation BID - RT   ceftriaxone 1 g Intravenous Q24H   ferric gluconate (FERRLECIT) IVPB 125 mg Intravenous Daily   furosemide 40 mg Oral BID   heparin (porcine) 5,000 Units Subcutaneous Q12H   hydrALAZINE 25 mg Oral TID   insulin lispro 0-7 Units Subcutaneous 4x Daily AC & at Bedtime   isosorbide mononitrate 60 mg Oral Daily   Pharmacy Meds to Bed Consult  Does not apply Daily   saccharomyces boulardii 250 mg Oral BID   sennosides-docusate sodium 2 tablet Oral Nightly         Assessment/Plan     Problem List  Hospital Problem List     * (Principal)Symptomatic anemia    Asthma    Diabetes mellitus    Hypertension    Morbid obesity    Renal failure    Overview Signed 8/3/2017  4:58 PM by SUELLEN Richey     Unclear whether this is a chronic problem.           Anxiety    Acute on chronic heart failure               Assessment/Plan:    NATALIO on CKD  - baseline unclear, but patient recently seen at [facility] with cr elevated to 4.6 (baseline reported 1.5)  - labs pending this am  - proteinuria  - nephrology evaluation, avoid nephrotoxins  - check renal panel am  - possible renal biopsy?    Anemia  - chronicity unclear, will check PCP and  records for prior hemoglobin levels  - patient denies melena or brbpr, no prior endoscopy  - iv iron replacement while hospitalized  - unclear if secondary to CKD vs GI loss  - will need colonoscopy +/- EGD IP vs OP, discussed with GI   - patient says she would not want to do colonoscopy currently, would like to feel stronger first  - check cbc in am    DMII  - a1c 6.0    Morbid obesity    Left leg pain and difficulty ambulating  - per OSH dc summary, duplex LE negative  - continue PT/OT  - OOBTC    UTI (POA), Ecoli  - continue rocephin  - probiotics    Constipation  - stool  softener      VTE proph: mechanical, will add q12h subq heparin as patient at increased risk of VTE given her relative immobility at present and monitor hemoglobin levels due to her anemia.    Discharge Planning: I expect patient to be discharged to home vs rehab in 2-3 days.    Hilton Robledo MD   08/07/17   7:19 AM

## 2017-08-07 NOTE — PROGRESS NOTES
"   LOS: 4 days    Patient Care Team:  Trevon Delarosa MD as PCP - General    Reason For Visit:  F/U NATALIO ON CKD.  Subjective     No acute issues. Panic attack earlier today.      Review of Systems:    Pulm: No soa   CV:  No CP      Objective       amLODIPine 10 mg Oral Daily   atorvastatin 10 mg Oral Daily   budesonide-formoterol 2 puff Inhalation BID - RT   ceftriaxone 1 g Intravenous Q24H   ferric gluconate (FERRLECIT) IVPB 125 mg Intravenous Daily   furosemide 40 mg Oral BID   heparin (porcine) 5,000 Units Subcutaneous Q12H   hydrALAZINE 25 mg Oral TID   insulin lispro 0-7 Units Subcutaneous 4x Daily AC & at Bedtime   isosorbide mononitrate 60 mg Oral Daily   Pharmacy Meds to Bed Consult  Does not apply Daily   saccharomyces boulardii 250 mg Oral BID   sennosides-docusate sodium 2 tablet Oral Nightly            Vital Signs:  Blood pressure 174/87, pulse 91, temperature 98.1 °F (36.7 °C), temperature source Oral, resp. rate 18, height 64\" (162.6 cm), weight (!) 332 lb 9 oz (151 kg), SpO2 94 %.    Flowsheet Rows         First Filed Value    Admission Height  63\" (160 cm) Documented at 08/03/2017 1256    Admission Weight  (!)  385 lb (175 kg) Documented at 08/03/2017 1256          08/06 0701 - 08/07 0700  In: 110   Out: -     Physical Exam:    General Appearance: NAD, alert and cooperative, Ox3  Eyes: PER, conjunctivae and sclerae normal, no icterus  Lungs: respirations regular and unlabored, no crepitus, clear to auscultation  Heart/CV: regular rhythm & normal rate, no murmur, no gallop, no rub and 1+ edema  Abdomen: not distended, soft, non-tender, no masses,  bowel sounds present. OBESE.  Skin: No rash, Warm and dry    Radiology: RENAL U/S UNREMARKABLE.            Labs: CPK 42. [place] AND ANCA PENDING.    Results from last 7 days  Lab Units 08/07/17  0332 08/06/17  0519 08/05/17  1102   WBC 10*3/mm3 7.78 8.23 8.66   HEMOGLOBIN g/dL 7.6* 7.5* 7.8*   HEMATOCRIT % 25.5* 24.2* 25.3*   PLATELETS 10*3/mm3 425 418 414 " "      Results from last 7 days  Lab Units 08/07/17  0332 08/06/17  0519 08/05/17  1102 08/03/17  1338   SODIUM mmol/L 141 137 136 137   POTASSIUM mmol/L 4.1 4.4 4.5 4.5   CHLORIDE mmol/L 102 100 98* 101   CO2 mmol/L 28.0 30.0 33.0* 29.0   BUN mg/dL 57* 58* 59* 58*   CREATININE mg/dL 3.60* 3.60* 3.60* 3.70*   CALCIUM mg/dL 8.6* 8.7 8.8 8.9   PHOSPHORUS mg/dL 5.4* 5.7* 5.8*  --    ALBUMIN g/dL 3.10* 3.10* 3.20 3.20       Results from last 7 days  Lab Units 08/07/17  0332   GLUCOSE mg/dL 138*         Results from last 7 days  Lab Units 08/03/17  1338   ALK PHOS U/L 104*   BILIRUBIN mg/dL 0.2*   ALT (SGPT) U/L 15   AST (SGOT) U/L 13             Results from last 7 days  Lab Units 08/03/17  1430   COLOR UA  Yellow   CLARITY UA  Cloudy*   PH, URINE  7.5   SPECIFIC GRAVITY, URINE  1.010   GLUCOSE UA  Negative   KETONES UA  Negative   BILIRUBIN UA  Negative   PROTEIN UA  100 mg/dL (2+)*   BLOOD UA  Trace*   LEUKOCYTES UA  Large (3+)*   NITRITE UA  Negative       Estimated Creatinine Clearance: 22.6 mL/min (by C-G formula based on Cr of 3.6).    FINDINGS: The right kidney measures 12.9 cm in length. There are simple  cysts, the largest which measures 2.1 cm. The left kidney measures 12.7  cm in length. There is a small simple cyst related to the left kidney  measuring 1.7 cm. There is a second smaller 1.4 cm cyst.      IMPRESSION:  The kidneys are of normal size. There are simple cyst  but  no solid renal mass, stone or obstruction.        Assessment     Principal Problem:    Symptomatic anemia  Active Problems:    Asthma    Diabetes mellitus    Hypertension    Morbid obesity    Renal failure    Anxiety    Acute on chronic heart failure      Impression:   NATALIO WITH GFR \" STUCK \".   PROTEINURIA IMPROVED FROM WHAT IT WAS AT [facility] LAST MONTH. P/C ration 4.6. [place] negative.   ANEMIA ON IV FE.    Recommendations: AWAIT PENDING STUDIES. If no improvement tomorrow will plan for renal biopsy on Wednesday. Discussed with patient " regarding biopsy - she is not sure but will consider and let us know. Will revisit this again tomorrow.         Jackson Welch MD  08/07/17  12:26 PM

## 2017-08-07 NOTE — PLAN OF CARE
Problem: Patient Care Overview (Adult)  Goal: Plan of Care Review  Outcome: Ongoing (interventions implemented as appropriate)    08/07/17 0408 08/07/17 0514   Coping/Psychosocial Response Interventions   Plan Of Care Reviewed With patient --    Outcome Evaluation   Outcome Summary/Follow up Plan --  VSS, Pt c/o left hip and knee pain, medicated per orders. C/o SOA, possibly related to anxiety. Medicated per orders. Woke from sleep scared and calling out. Relaxation techniques promoted. No further issues or concerns.   Patient Care Overview   Progress --  progress towards functional goals is fair       Goal: Discharge Needs Assessment  Outcome: Ongoing (interventions implemented as appropriate)    Problem: Skin Integrity Impairment, Risk/Actual (Adult)  Goal: Skin Integrity/Wound Healing  Outcome: Ongoing (interventions implemented as appropriate)

## 2017-08-07 NOTE — PROGRESS NOTES
Continued Stay Note  [facility]     Patient Name: Joy Bueno  MRN: [id number]  Today's Date: 8/7/2017    Admit Date: 8/3/2017          Discharge Plan       08/07/17 0918    Case Management/Social Work Plan    Plan discharge plan    Patient/Family In Agreement With Plan yes    Additional Comments Spoke with Heather Nunes(liason for [facility]) on unit, and they can take pt back to [facility] when medically ready for discharge. CM will cont to follow.              Discharge Codes     None            Lucy Winkler, RN

## 2017-08-07 NOTE — PLAN OF CARE
Problem: Patient Care Overview (Adult)  Goal: Plan of Care Review  Outcome: Ongoing (interventions implemented as appropriate)    08/07/17 0853   Coping/Psychosocial Response Interventions   Plan Of Care Reviewed With patient   Outcome Evaluation   Outcome Summary/Follow up Plan pt gets very anxious,tried to keep her calm.refused to try sitting on side of bed.oob via lift.attempted standing unable to .will need rehab   Patient Care Overview   Progress progress towards functional goals is fair         Problem: Inpatient Physical Therapy  Goal: Bed Mobility Goal LTG- PT  Outcome: Ongoing (interventions implemented as appropriate)    08/04/17 1103 08/07/17 0853   Bed Mobility PT LTG   Bed Mobility PT LTG, Date Established 08/04/17 --    Bed Mobility PT LTG, Time to Achieve 2 wks --    Bed Mobility PT LTG, Activity Type supine to sit/sit to supine --    Bed Mobility PT LTG, [place] Level moderate assist (50% patient effort);2 person assist required --    Bed Mobility PT LTG, Outcome --  goal ongoing       Goal: Transfer Training Goal 1 LTG- PT  Outcome: Ongoing (interventions implemented as appropriate)    08/04/17 1103 08/07/17 0853   Transfer Training PT LTG   Transfer Training PT LTG, Date Established 08/04/17 --    Transfer Training PT LTG, Time to Achieve 2 wks --    Transfer Training PT LTG, Activity Type sit to stand/stand to sit --    Transfer Training PT LTG, [place] Level moderate assist (50% patient effort);2 person assist required --    Transfer Training PT LTG, Assist Device walker, rolling --    Transfer Training PT LTG, Outcome --  goal ongoing       Goal: Gait Training Goal LTG- PT  Outcome: Ongoing (interventions implemented as appropriate)    08/04/17 1103 08/07/17 0853   Gait Training PT LTG   Gait Training Goal PT LTG, Date Established 08/04/17 --    Gait Training Goal PT LTG, Time to Achieve 2 wks --    Gait Training Goal PT LTG, [place] Level moderate assist (50% patient effort);2  person assist required --    Gait Training Goal PT LTG, Assist Device walker, rolling --    Gait Training Goal PT LTG, Distance to Achieve 10 --    Gait Training Goal PT LTG, Outcome --  goal ongoing

## 2017-08-08 LAB
ALBUMIN SERPL-MCNC: 3.1 G/DL (ref 3.2–4.8)
ANION GAP SERPL CALCULATED.3IONS-SCNC: 6 MMOL/L (ref 3–11)
BUN BLD-MCNC: 37 MG/DL (ref 9–23)
BUN/CREAT SERPL: 13.7 (ref 7–25)
C-ANCA TITR SER IF: ABNORMAL TITER
CALCIUM SPEC-SCNC: 8.4 MG/DL (ref 8.7–10.4)
CHLORIDE SERPL-SCNC: 104 MMOL/L (ref 99–109)
CO2 SERPL-SCNC: 30 MMOL/L (ref 20–31)
CREAT BLD-MCNC: 2.7 MG/DL (ref 0.6–1.3)
DEPRECATED RDW RBC AUTO: 48.5 FL (ref 37–54)
ERYTHROCYTE [DISTWIDTH] IN BLOOD BY AUTOMATED COUNT: 15 % (ref 11.3–14.5)
GFR SERPL CREATININE-BSD FRML MDRD: 21 ML/MIN/1.73
GLUCOSE BLD-MCNC: 147 MG/DL (ref 70–100)
GLUCOSE BLDC GLUCOMTR-MCNC: 119 MG/DL (ref 70–130)
GLUCOSE BLDC GLUCOMTR-MCNC: 153 MG/DL (ref 70–130)
GLUCOSE BLDC GLUCOMTR-MCNC: 180 MG/DL (ref 70–130)
GLUCOSE BLDC GLUCOMTR-MCNC: 205 MG/DL (ref 70–130)
HCT VFR BLD AUTO: 24.3 % (ref 34.5–44)
HGB BLD-MCNC: 7.5 G/DL (ref 11.5–15.5)
MCH RBC QN AUTO: 27.4 PG (ref 27–31)
MCHC RBC AUTO-ENTMCNC: 30.9 G/DL (ref 32–36)
MCV RBC AUTO: 88.7 FL (ref 80–99)
MYELOPEROXIDASE AB SER-ACNC: <9 U/ML (ref 0–9)
P-ANCA ATYPICAL TITR SER IF: ABNORMAL TITER
P-ANCA TITR SER IF: ABNORMAL TITER
PHOSPHATE SERPL-MCNC: 4.6 MG/DL (ref 2.4–5.1)
PLATELET # BLD AUTO: 402 10*3/MM3 (ref 150–450)
PMV BLD AUTO: 9.1 FL (ref 6–12)
POTASSIUM BLD-SCNC: 4.2 MMOL/L (ref 3.5–5.5)
PROTEINASE3 AB SER IA-ACNC: <3.5 U/ML (ref 0–3.5)
RBC # BLD AUTO: 2.74 10*6/MM3 (ref 3.89–5.14)
SODIUM BLD-SCNC: 140 MMOL/L (ref 132–146)
WBC NRBC COR # BLD: 8.08 10*3/MM3 (ref 3.5–10.8)

## 2017-08-08 PROCEDURE — 85027 COMPLETE CBC AUTOMATED: CPT | Performed by: INTERNAL MEDICINE

## 2017-08-08 PROCEDURE — 94640 AIRWAY INHALATION TREATMENT: CPT

## 2017-08-08 PROCEDURE — 82962 GLUCOSE BLOOD TEST: CPT

## 2017-08-08 PROCEDURE — 80069 RENAL FUNCTION PANEL: CPT | Performed by: INTERNAL MEDICINE

## 2017-08-08 PROCEDURE — 94799 UNLISTED PULMONARY SVC/PX: CPT

## 2017-08-08 PROCEDURE — 25010000002 NA FERRIC GLUC CPLX PER 12.5 MG: Performed by: INTERNAL MEDICINE

## 2017-08-08 PROCEDURE — 99232 SBSQ HOSP IP/OBS MODERATE 35: CPT | Performed by: NURSE PRACTITIONER

## 2017-08-08 PROCEDURE — 25010000002 HEPARIN (PORCINE) PER 1000 UNITS: Performed by: INTERNAL MEDICINE

## 2017-08-08 PROCEDURE — 25010000002 CEFTRIAXONE PER 250 MG: Performed by: INTERNAL MEDICINE

## 2017-08-08 PROCEDURE — 94760 N-INVAS EAR/PLS OXIMETRY 1: CPT

## 2017-08-08 RX ORDER — HYDRALAZINE HYDROCHLORIDE 50 MG/1
50 TABLET, FILM COATED ORAL 3 TIMES DAILY
Status: DISCONTINUED | OUTPATIENT
Start: 2017-08-08 | End: 2017-08-12 | Stop reason: HOSPADM

## 2017-08-08 RX ADMIN — HYDRALAZINE HYDROCHLORIDE 50 MG: 50 TABLET ORAL at 20:41

## 2017-08-08 RX ADMIN — HYDROCODONE BITARTRATE AND ACETAMINOPHEN 1 TABLET: 5; 325 TABLET ORAL at 03:25

## 2017-08-08 RX ADMIN — INSULIN LISPRO 3 UNITS: 100 INJECTION, SOLUTION INTRAVENOUS; SUBCUTANEOUS at 17:03

## 2017-08-08 RX ADMIN — CEFTRIAXONE SODIUM 1 G: 1 INJECTION, SOLUTION INTRAVENOUS at 14:26

## 2017-08-08 RX ADMIN — FUROSEMIDE 40 MG: 40 TABLET ORAL at 17:04

## 2017-08-08 RX ADMIN — Medication 250 MG: at 08:32

## 2017-08-08 RX ADMIN — HYDROCODONE BITARTRATE AND ACETAMINOPHEN 1 TABLET: 5; 325 TABLET ORAL at 08:43

## 2017-08-08 RX ADMIN — BUDESONIDE AND FORMOTEROL FUMARATE DIHYDRATE 2 PUFF: 80; 4.5 AEROSOL RESPIRATORY (INHALATION) at 08:51

## 2017-08-08 RX ADMIN — HYDROCODONE BITARTRATE AND ACETAMINOPHEN 1 TABLET: 5; 325 TABLET ORAL at 12:25

## 2017-08-08 RX ADMIN — INSULIN LISPRO 2 UNITS: 100 INJECTION, SOLUTION INTRAVENOUS; SUBCUTANEOUS at 20:40

## 2017-08-08 RX ADMIN — HYDRALAZINE HYDROCHLORIDE 25 MG: 25 TABLET ORAL at 05:45

## 2017-08-08 RX ADMIN — Medication 2 TABLET: at 20:41

## 2017-08-08 RX ADMIN — INSULIN LISPRO 2 UNITS: 100 INJECTION, SOLUTION INTRAVENOUS; SUBCUTANEOUS at 12:26

## 2017-08-08 RX ADMIN — SODIUM CHLORIDE 125 MG: 9 INJECTION, SOLUTION INTRAVENOUS at 08:31

## 2017-08-08 RX ADMIN — BUDESONIDE AND FORMOTEROL FUMARATE DIHYDRATE 2 PUFF: 80; 4.5 AEROSOL RESPIRATORY (INHALATION) at 21:04

## 2017-08-08 RX ADMIN — HYDROCODONE BITARTRATE AND ACETAMINOPHEN 1 TABLET: 5; 325 TABLET ORAL at 22:04

## 2017-08-08 RX ADMIN — HYDRALAZINE HYDROCHLORIDE 50 MG: 50 TABLET ORAL at 14:26

## 2017-08-08 RX ADMIN — HEPARIN SODIUM 5000 UNITS: 5000 INJECTION, SOLUTION INTRAVENOUS; SUBCUTANEOUS at 20:40

## 2017-08-08 RX ADMIN — AMLODIPINE BESYLATE 10 MG: 10 TABLET ORAL at 08:32

## 2017-08-08 RX ADMIN — Medication 250 MG: at 17:04

## 2017-08-08 RX ADMIN — ISOSORBIDE MONONITRATE 60 MG: 60 TABLET, EXTENDED RELEASE ORAL at 08:32

## 2017-08-08 RX ADMIN — ATORVASTATIN CALCIUM 10 MG: 10 TABLET, FILM COATED ORAL at 08:32

## 2017-08-08 RX ADMIN — FUROSEMIDE 40 MG: 40 TABLET ORAL at 08:32

## 2017-08-08 RX ADMIN — HEPARIN SODIUM 5000 UNITS: 5000 INJECTION, SOLUTION INTRAVENOUS; SUBCUTANEOUS at 08:32

## 2017-08-08 NOTE — PROGRESS NOTES
[facility] Medicine Services  INPATIENT PROGRESS NOTE    Date of Admission: 8/3/2017  Length of Stay: 5  Primary Care Physician: Trevon Delarosa MD    Subjective   CC: FU anemia, renal failure  HPI:    Patient resting comfortably in chair this afternoon.  States that she has a rash on her face that is itchy and somewhat painful.  States that she has sensitive skin and she thinks she used soap on her face.        Review Of Systems:   Review of Systems   Constitutional: Positive for fatigue.   HENT: Negative.    Eyes: Negative.    Respiratory: Negative.    Cardiovascular: Negative.    Gastrointestinal: Negative.    Endocrine: Negative.    Genitourinary: Negative.    Musculoskeletal: Positive for arthralgias.   Allergic/Immunologic: Negative.    Neurological: Positive for weakness.   Hematological: Negative.    Psychiatric/Behavioral: The patient is nervous/anxious.          Objective      Temp:  [98.2 °F (36.8 °C)-98.4 °F (36.9 °C)] 98.2 °F (36.8 °C)  Heart Rate:  [87-92] 90  Resp:  [16] 16  BP: (164-185)/(85-95) 164/88  Physical Exam     General: Resting in chair, NAD, no family at bedside.  HEENT: Normocephalic, mucous membranes moist, pupils equal  Neck: Supple, trachea midline  Cardiovascular: Regular rate and rhythm, S1-S2, no murmur  Respiratory: Clear to auscultation bilaterally, decreased in bases, even unlabored breathing on nasal cannula  Abdomen: Soft, nontender, nondistended, bowel sounds +, obese  Skin: Clean, dry, intact, no lesions or sores  Extremities: SUTTON, Symmetrical, pulses +, 2+ pitting edema  Neuro: Alert, oriented ×4, appropriate and cooperative,  and pedal pushes equal, sensation intact        Results Review:    I have reviewed the labs, radiology results and diagnostic studies.      Results from last 7 days  Lab Units 08/08/17  0354   WBC 10*3/mm3 8.08   HEMOGLOBIN g/dL 7.5*   PLATELETS 10*3/mm3 402       Results from last 7 days  Lab Units 08/08/17  0902    SODIUM mmol/L 140   POTASSIUM mmol/L 4.2   CHLORIDE mmol/L 104   CO2 mmol/L 30.0   BUN mg/dL 37*   CREATININE mg/dL 2.70*   GLUCOSE mg/dL 147*   CALCIUM mg/dL 8.4*       Culture Data: Cultures:    Urine Culture   Date Value Ref Range Status   08/03/2017 >100,000 CFU/mL Gram Negative Bacilli (A)  Preliminary       Radiology Data:     I have reviewed the medications.    amLODIPine 10 mg Oral Daily   atorvastatin 10 mg Oral Daily   budesonide-formoterol 2 puff Inhalation BID - RT   ceftriaxone 1 g Intravenous Q24H   ferric gluconate (FERRLECIT) IVPB 125 mg Intravenous Daily   furosemide 40 mg Oral BID   heparin (porcine) 5,000 Units Subcutaneous Q12H   hydrALAZINE 50 mg Oral TID   insulin lispro 0-7 Units Subcutaneous 4x Daily AC & at Bedtime   isosorbide mononitrate 60 mg Oral Daily   Pharmacy Meds to Bed Consult  Does not apply Daily   saccharomyces boulardii 250 mg Oral BID   sennosides-docusate sodium 2 tablet Oral Nightly         Assessment/Plan     Problem List  Hospital Problem List     * (Principal)Symptomatic anemia    Hypertension    Renal failure    Overview Signed 8/3/2017  4:58 PM by SUELLEN Richey     Unclear whether this is a chronic problem.           Asthma    Diabetes mellitus    Morbid obesity    Anxiety    Acute on chronic heart failure               Assessment/Plan:    NATALIO on CKD  - baseline unclear, but patient recently seen at [facility] with cr elevated to 4.6 (baseline reported 1.5)  - creatinine improved today.  Hold off on renal biopsy for now per nephrology.   - proteinuria  - avoid nephrotoxins      Anemia  - chronicity unclear, will check PCP and  records for prior hemoglobin levels  - patient denies melena or brbpr, no prior endoscopy  - iv iron replacement while hospitalized  - unclear if secondary to CKD vs GI loss  - will need colonoscopy +/- EGD IP vs OP, discussed with GI   - patient says she would not want to do colonoscopy currently, would like to feel stronger  first      DMII  - a1c 6.0    Morbid obesity    Left leg pain and difficulty ambulating  - per OSH dc summary, duplex LE negative  - continue PT/OT  - OOBTC    UTI (POA), Ecoli  - continue rocephin, currently on day 5  - probiotics    Constipation  - stool softener      VTE proph: SQ heparin  Discharge Planning: I expect patient to be discharged back to [facility] once medically ready.     SUELLEN Richey   08/08/17   3:19 PM

## 2017-08-08 NOTE — PROGRESS NOTES
Continued Stay Note  [facility]     Patient Name: Joy Bueno  MRN: [id number]  Today's Date: 8/8/2017    Admit Date: 8/3/2017          Discharge Plan     Consent obtained for the participation in the [facility]. Ca Allred RN                Discharge Codes     None            Ca Allred RN

## 2017-08-08 NOTE — PROGRESS NOTES
"   LOS: 5 days    Patient Care Team:  Trevon Delarosa MD as PCP - General    Reason For Visit:  F/U NATALIO ON CKD.  Subjective     No acute issues. No new complaints.       Review of Systems:    Pulm: No soa   CV:  No CP      Objective       amLODIPine 10 mg Oral Daily   atorvastatin 10 mg Oral Daily   budesonide-formoterol 2 puff Inhalation BID - RT   ceftriaxone 1 g Intravenous Q24H   ferric gluconate (FERRLECIT) IVPB 125 mg Intravenous Daily   furosemide 40 mg Oral BID   heparin (porcine) 5,000 Units Subcutaneous Q12H   hydrALAZINE 25 mg Oral TID   insulin lispro 0-7 Units Subcutaneous 4x Daily AC & at Bedtime   isosorbide mononitrate 60 mg Oral Daily   Pharmacy Meds to Bed Consult  Does not apply Daily   saccharomyces boulardii 250 mg Oral BID   sennosides-docusate sodium 2 tablet Oral Nightly            Vital Signs:  Blood pressure 164/88, pulse 90, temperature 98.2 °F (36.8 °C), temperature source Oral, resp. rate 16, height 64\" (162.6 cm), weight (!) 333 lb 11.2 oz (151 kg), SpO2 97 %.    Flowsheet Rows         First Filed Value    Admission Height  63\" (160 cm) Documented at 08/03/2017 1256    Admission Weight  (!)  385 lb (175 kg) Documented at 08/03/2017 1256          08/07 0701 - 08/08 0700  In: 770 [P.O.:770]  Out: -     Physical Exam:    General Appearance: NAD, alert and cooperative, Ox3  Eyes: PER, conjunctivae and sclerae normal, no icterus  Lungs: respirations regular and unlabored, no crepitus, clear to auscultation  Heart/CV: regular rhythm & normal rate, no murmur, no gallop, no rub and 1+ edema  Abdomen: not distended, soft, non-tender, no masses,  bowel sounds present. OBESE.  Skin: No rash, Warm and dry    Radiology: RENAL U/S UNREMARKABLE.            Labs: CPK 42. AUSTIN AND ANCA PENDING.    Results from last 7 days  Lab Units 08/08/17  0354 08/07/17  0332 08/06/17  0519   WBC 10*3/mm3 8.08 7.78 8.23   HEMOGLOBIN g/dL 7.5* 7.6* 7.5*   HEMATOCRIT % 24.3* 25.5* 24.2*   PLATELETS 10*3/mm3 402 425 " 418       Results from last 7 days  Lab Units 08/08/17  0902 08/07/17  0332 08/06/17  0519 08/05/17  1102   SODIUM mmol/L 140 141 137 136   POTASSIUM mmol/L 4.2 4.1 4.4 4.5   CHLORIDE mmol/L 104 102 100 98*   CO2 mmol/L 30.0 28.0 30.0 33.0*   BUN mg/dL 37* 57* 58* 59*   CREATININE mg/dL 2.70* 3.60* 3.60* 3.60*   CALCIUM mg/dL 8.4* 8.6* 8.7 8.8   PHOSPHORUS mg/dL 4.6 5.4* 5.7* 5.8*   ALBUMIN g/dL 3.10* 3.10* 3.10* 3.20       Results from last 7 days  Lab Units 08/08/17  0902   GLUCOSE mg/dL 147*         Results from last 7 days  Lab Units 08/03/17  1338   ALK PHOS U/L 104*   BILIRUBIN mg/dL 0.2*   ALT (SGPT) U/L 15   AST (SGOT) U/L 13             Results from last 7 days  Lab Units 08/03/17  1430   COLOR UA  Yellow   CLARITY UA  Cloudy*   PH, URINE  7.5   SPECIFIC GRAVITY, URINE  1.010   GLUCOSE UA  Negative   KETONES UA  Negative   BILIRUBIN UA  Negative   PROTEIN UA  100 mg/dL (2+)*   BLOOD UA  Trace*   LEUKOCYTES UA  Large (3+)*   NITRITE UA  Negative       Estimated Creatinine Clearance: 30.2 mL/min (by C-G formula based on Cr of 2.7).    FINDINGS: The right kidney measures 12.9 cm in length. There are simple  cysts, the largest which measures 2.1 cm. The left kidney measures 12.7  cm in length. There is a small simple cyst related to the left kidney  measuring 1.7 cm. There is a second smaller 1.4 cm cyst.      IMPRESSION:  The kidneys are of normal size. There are simple cyst  but  no solid renal mass, stone or obstruction.        Assessment     Principal Problem:    Symptomatic anemia  Active Problems:    Asthma    Diabetes mellitus    Hypertension    Morbid obesity    Renal failure    Anxiety    Acute on chronic heart failure      Impression:   NATALIO - non oliguric - Scr improved from 3.6 to 2.7 today.   PROTEINURIA IMPROVED FROM WHAT IT WAS AT [facility] LAST MONTH. P/C ration 4.6. AUSTIN negative.   ANEMIA ON IV FE.    Recommendations:   AWAIT PENDING STUDIES.  Monitor I/O   Avoid nephrotoxic agents  Continue with  diuresis.   Monitor H/H  Will hold on to renal biopsy for now.   Will increase hydralazine dose.     Jackson Welch MD  08/08/17  11:30 AM

## 2017-08-08 NOTE — PLAN OF CARE
Problem: Patient Care Overview (Adult)  Goal: Plan of Care Review    08/07/17 2020   Coping/Psychosocial Response Interventions   Plan Of Care Reviewed With patient   Patient Care Overview   Progress progress toward functional goals as expected

## 2017-08-08 NOTE — PLAN OF CARE
Problem: Patient Care Overview (Adult)  Goal: Plan of Care Review  Outcome: Ongoing (interventions implemented as appropriate)    08/07/17 2020 08/07/17 2206 08/08/17 0424   Coping/Psychosocial Response Interventions   Plan Of Care Reviewed With --  patient --    Outcome Evaluation   Outcome Summary/Follow up Plan --  --  VSS, pt c/o leg and hip pain and anxious feeling. Medicated per orders. Has rested well between care. No further issues or concerns.    Patient Care Overview   Progress progress toward functional goals as expected --  --        Goal: Discharge Needs Assessment  Outcome: Ongoing (interventions implemented as appropriate)    Problem: Skin Integrity Impairment, Risk/Actual (Adult)  Goal: Skin Integrity/Wound Healing  Outcome: Ongoing (interventions implemented as appropriate)    Problem: Pressure Ulcer Risk (Saurav Scale) (Adult,Obstetrics,Pediatric)  Goal: Identify Related Risk Factors and Signs and Symptoms  Outcome: Outcome(s) achieved Date Met:  08/08/17  Goal: Skin Integrity  Outcome: Ongoing (interventions implemented as appropriate)

## 2017-08-08 NOTE — PROGRESS NOTES
Adult Nutrition  Assessment/PES    Patient Name:  Joy Bueno  YOB: 1950  MRN: [id number]  Admit Date:  8/3/2017    Assessment Date:  8/8/2017        Reason for Assessment       08/08/17 0912    Reason for Assessment    Time Spent (min) 5            Data Eval/ Notes       08/08/17 0912     Notes    Note Noted pt. on 1500 mlsl fluid restricition                            Problem/Interventions:                      Nutrition Prescription       08/08/17 0913    Nutrition Prescription PO    Common Modifiers Fluid Restriction    Fluid Restriction mL per Tray --   240 mls. per tray, per discussion with nsg.              Electronically signed by:  Nancy Lal RD  08/08/17 9:13 AM

## 2017-08-08 NOTE — PLAN OF CARE
Problem: Skin Integrity Impairment, Risk/Actual (Adult)  Goal: Skin Integrity/Wound Healing    08/07/17 2020   Skin Integrity Impairment, Risk/Actual (Adult)   Skin Integrity/Wound Healing making progress toward out       08/07/17 2020   Skin Integrity Impairment, Risk/Actual (Adult)   Skin Integrity/Wound Healing making progress toward outcome   come

## 2017-08-08 NOTE — PLAN OF CARE
Problem: Patient Care Overview (Adult)  Goal: Plan of Care Review  Outcome: Ongoing (interventions implemented as appropriate)    08/08/17 1542   Coping/Psychosocial Response Interventions   Plan Of Care Reviewed With patient   Outcome Evaluation   Outcome Summary/Follow up Plan pt tolerating diet, up in chair for most of the day. 2L NC / room air. No compliants.    Patient Care Overview   Progress improving

## 2017-08-09 LAB
ALBUMIN SERPL-MCNC: 3 G/DL (ref 3.2–4.8)
ANION GAP SERPL CALCULATED.3IONS-SCNC: 6 MMOL/L (ref 3–11)
BASOPHILS # BLD AUTO: 0.02 10*3/MM3 (ref 0–0.2)
BASOPHILS NFR BLD AUTO: 0.3 % (ref 0–1)
BUN BLD-MCNC: 59 MG/DL (ref 9–23)
BUN/CREAT SERPL: 17.4 (ref 7–25)
CALCIUM SPEC-SCNC: 8.6 MG/DL (ref 8.7–10.4)
CHLORIDE SERPL-SCNC: 105 MMOL/L (ref 99–109)
CO2 SERPL-SCNC: 29 MMOL/L (ref 20–31)
CREAT BLD-MCNC: 3.4 MG/DL (ref 0.6–1.3)
DEPRECATED RDW RBC AUTO: 51.7 FL (ref 37–54)
EOSINOPHIL # BLD AUTO: 0.23 10*3/MM3 (ref 0–0.3)
EOSINOPHIL NFR BLD AUTO: 3.1 % (ref 0–3)
ERYTHROCYTE [DISTWIDTH] IN BLOOD BY AUTOMATED COUNT: 15.4 % (ref 11.3–14.5)
GFR SERPL CREATININE-BSD FRML MDRD: 16 ML/MIN/1.73
GLUCOSE BLD-MCNC: 145 MG/DL (ref 70–100)
GLUCOSE BLDC GLUCOMTR-MCNC: 141 MG/DL (ref 70–130)
GLUCOSE BLDC GLUCOMTR-MCNC: 143 MG/DL (ref 70–130)
GLUCOSE BLDC GLUCOMTR-MCNC: 170 MG/DL (ref 70–130)
GLUCOSE BLDC GLUCOMTR-MCNC: 185 MG/DL (ref 70–130)
HCT VFR BLD AUTO: 24.7 % (ref 34.5–44)
HGB BLD-MCNC: 7.4 G/DL (ref 11.5–15.5)
IMM GRANULOCYTES # BLD: 0.1 10*3/MM3 (ref 0–0.03)
IMM GRANULOCYTES NFR BLD: 1.4 % (ref 0–0.6)
LYMPHOCYTES # BLD AUTO: 1.64 10*3/MM3 (ref 0.6–4.8)
LYMPHOCYTES NFR BLD AUTO: 22.2 % (ref 24–44)
MCH RBC QN AUTO: 27.5 PG (ref 27–31)
MCHC RBC AUTO-ENTMCNC: 30 G/DL (ref 32–36)
MCV RBC AUTO: 91.8 FL (ref 80–99)
MONOCYTES # BLD AUTO: 0.54 10*3/MM3 (ref 0–1)
MONOCYTES NFR BLD AUTO: 7.3 % (ref 0–12)
NEUTROPHILS # BLD AUTO: 4.87 10*3/MM3 (ref 1.5–8.3)
NEUTROPHILS NFR BLD AUTO: 65.7 % (ref 41–71)
PHOSPHATE SERPL-MCNC: 5.6 MG/DL (ref 2.4–5.1)
PLATELET # BLD AUTO: 361 10*3/MM3 (ref 150–450)
PMV BLD AUTO: 9.3 FL (ref 6–12)
POTASSIUM BLD-SCNC: 4.2 MMOL/L (ref 3.5–5.5)
RBC # BLD AUTO: 2.69 10*6/MM3 (ref 3.89–5.14)
SODIUM BLD-SCNC: 140 MMOL/L (ref 132–146)
WBC NRBC COR # BLD: 7.4 10*3/MM3 (ref 3.5–10.8)

## 2017-08-09 PROCEDURE — 94799 UNLISTED PULMONARY SVC/PX: CPT

## 2017-08-09 PROCEDURE — 93010 ELECTROCARDIOGRAM REPORT: CPT | Performed by: INTERNAL MEDICINE

## 2017-08-09 PROCEDURE — 85025 COMPLETE CBC W/AUTO DIFF WBC: CPT | Performed by: NURSE PRACTITIONER

## 2017-08-09 PROCEDURE — 94640 AIRWAY INHALATION TREATMENT: CPT

## 2017-08-09 PROCEDURE — 97110 THERAPEUTIC EXERCISES: CPT

## 2017-08-09 PROCEDURE — 25010000002 HEPARIN (PORCINE) PER 1000 UNITS: Performed by: INTERNAL MEDICINE

## 2017-08-09 PROCEDURE — 80069 RENAL FUNCTION PANEL: CPT | Performed by: NURSE PRACTITIONER

## 2017-08-09 PROCEDURE — 82962 GLUCOSE BLOOD TEST: CPT

## 2017-08-09 PROCEDURE — 25010000002 NA FERRIC GLUC CPLX PER 12.5 MG: Performed by: INTERNAL MEDICINE

## 2017-08-09 PROCEDURE — 25010000002 CEFTRIAXONE PER 250 MG: Performed by: HOSPITALIST

## 2017-08-09 PROCEDURE — 94760 N-INVAS EAR/PLS OXIMETRY 1: CPT

## 2017-08-09 PROCEDURE — 93005 ELECTROCARDIOGRAM TRACING: CPT | Performed by: NURSE PRACTITIONER

## 2017-08-09 RX ORDER — SODIUM CHLORIDE 0.9 % (FLUSH) 0.9 %
1-10 SYRINGE (ML) INJECTION AS NEEDED
Status: DISCONTINUED | OUTPATIENT
Start: 2017-08-09 | End: 2017-08-12 | Stop reason: HOSPADM

## 2017-08-09 RX ADMIN — BUDESONIDE AND FORMOTEROL FUMARATE DIHYDRATE 2 PUFF: 80; 4.5 AEROSOL RESPIRATORY (INHALATION) at 07:46

## 2017-08-09 RX ADMIN — BISACODYL 5 MG: 5 TABLET, COATED ORAL at 18:03

## 2017-08-09 RX ADMIN — BUDESONIDE AND FORMOTEROL FUMARATE DIHYDRATE 2 PUFF: 80; 4.5 AEROSOL RESPIRATORY (INHALATION) at 21:13

## 2017-08-09 RX ADMIN — Medication 250 MG: at 08:56

## 2017-08-09 RX ADMIN — HYDRALAZINE HYDROCHLORIDE 50 MG: 50 TABLET ORAL at 05:51

## 2017-08-09 RX ADMIN — SODIUM CHLORIDE 125 MG: 9 INJECTION, SOLUTION INTRAVENOUS at 08:57

## 2017-08-09 RX ADMIN — HYDRALAZINE HYDROCHLORIDE 50 MG: 50 TABLET ORAL at 14:08

## 2017-08-09 RX ADMIN — HYDROCODONE BITARTRATE AND ACETAMINOPHEN 1 TABLET: 5; 325 TABLET ORAL at 10:07

## 2017-08-09 RX ADMIN — FUROSEMIDE 40 MG: 40 TABLET ORAL at 08:56

## 2017-08-09 RX ADMIN — CEFTRIAXONE SODIUM 1 G: 1 INJECTION, SOLUTION INTRAVENOUS at 14:08

## 2017-08-09 RX ADMIN — Medication 2 TABLET: at 20:37

## 2017-08-09 RX ADMIN — INSULIN LISPRO 2 UNITS: 100 INJECTION, SOLUTION INTRAVENOUS; SUBCUTANEOUS at 11:49

## 2017-08-09 RX ADMIN — HEPARIN SODIUM 5000 UNITS: 5000 INJECTION, SOLUTION INTRAVENOUS; SUBCUTANEOUS at 08:56

## 2017-08-09 RX ADMIN — DIAZEPAM 2 MG: 2 TABLET ORAL at 01:25

## 2017-08-09 RX ADMIN — ATORVASTATIN CALCIUM 10 MG: 10 TABLET, FILM COATED ORAL at 08:56

## 2017-08-09 RX ADMIN — BISACODYL 10 MG: 10 SUPPOSITORY RECTAL at 15:24

## 2017-08-09 RX ADMIN — FUROSEMIDE 40 MG: 40 TABLET ORAL at 17:14

## 2017-08-09 RX ADMIN — Medication 250 MG: at 17:14

## 2017-08-09 RX ADMIN — HYDROCODONE BITARTRATE AND ACETAMINOPHEN 1 TABLET: 5; 325 TABLET ORAL at 22:15

## 2017-08-09 RX ADMIN — INSULIN LISPRO 2 UNITS: 100 INJECTION, SOLUTION INTRAVENOUS; SUBCUTANEOUS at 17:14

## 2017-08-09 RX ADMIN — ISOSORBIDE MONONITRATE 60 MG: 60 TABLET, EXTENDED RELEASE ORAL at 08:56

## 2017-08-09 RX ADMIN — HYDRALAZINE HYDROCHLORIDE 50 MG: 50 TABLET ORAL at 20:38

## 2017-08-09 RX ADMIN — DIAZEPAM 2 MG: 2 TABLET ORAL at 20:38

## 2017-08-09 RX ADMIN — AMLODIPINE BESYLATE 10 MG: 10 TABLET ORAL at 08:56

## 2017-08-09 RX ADMIN — HEPARIN SODIUM 5000 UNITS: 5000 INJECTION, SOLUTION INTRAVENOUS; SUBCUTANEOUS at 20:37

## 2017-08-09 RX ADMIN — MAGNESIUM HYDROXIDE 10 ML: 2400 SUSPENSION ORAL at 18:02

## 2017-08-09 NOTE — NURSING NOTE
Spoke with ADILSON Person-patient was in bariatric bed yesterday that broke. Patient was transferred to regular floor bed while waiting for new bed. When new bed arrived, patient refused transfer. No other concerns noted by RN. MATT will sign-off for now. Please notify for any new concerns.

## 2017-08-09 NOTE — PROGRESS NOTES
Continued Stay Note  [facility]     Patient Name: Joy Bueno  MRN: [id number]  Today's Date: 8/9/2017    Admit Date: 8/3/2017          Discharge Plan       08/09/17 0958    Case Management/Social Work Plan    Plan transportation    Patient/Family In Agreement With Plan yes    Additional Comments Spoke with primary RN and pt not medically ready for dischrge today. Ambulance rescheduled for Thurs.8/10 at 3pm if pt medically ready for discharge. Heather from [facility] aware pt not medically ready for discharge today. CM will cont to follow,              Discharge Codes     None            Lucy Winkler RN

## 2017-08-09 NOTE — PLAN OF CARE
Problem: Patient Care Overview (Adult)  Goal: Plan of Care Review  Outcome: Ongoing (interventions implemented as appropriate)    08/09/17 1614   Coping/Psychosocial Response Interventions   Plan Of Care Reviewed With patient   Patient Care Overview   Progress progress toward functional goals as expected       Goal: Adult Individualization and Mutuality  Outcome: Ongoing (interventions implemented as appropriate)    Problem: Skin Integrity Impairment, Risk/Actual (Adult)  Goal: Skin Integrity/Wound Healing  Outcome: Ongoing (interventions implemented as appropriate)    08/09/17 1614   Skin Integrity Impairment, Risk/Actual (Adult)   Skin Integrity/Wound Healing making progress toward outcome         Problem: Pressure Ulcer Risk (Saurav Scale) (Adult,Obstetrics,Pediatric)  Goal: Skin Integrity  Outcome: Ongoing (interventions implemented as appropriate)    08/09/17 1614   Pressure Ulcer Risk (Saurav Scale) (Adult,Obstetrics,Pediatric)   Skin Integrity making progress toward outcome

## 2017-08-09 NOTE — PLAN OF CARE
Problem: Patient Care Overview (Adult)  Goal: Plan of Care Review  Outcome: Ongoing (interventions implemented as appropriate)    08/09/17 0100 08/09/17 0508   Coping/Psychosocial Response Interventions   Plan Of Care Reviewed With patient --    Outcome Evaluation   Outcome Summary/Follow up Plan --  VSS, abnormal tele change, SUELLEN Wills notifed. Pt asymptomatic. EKG ordered and completed. Pt rested well between care. Reported mild anxiety, medicated per orders. No further issues or concerns.   Patient Care Overview   Progress --  progress toward functional goals as expected       Goal: Discharge Needs Assessment  Outcome: Ongoing (interventions implemented as appropriate)    Problem: Skin Integrity Impairment, Risk/Actual (Adult)  Goal: Skin Integrity/Wound Healing  Outcome: Ongoing (interventions implemented as appropriate)    Problem: Pressure Ulcer Risk (Saurav Scale) (Adult,Obstetrics,Pediatric)  Goal: Skin Integrity  Outcome: Ongoing (interventions implemented as appropriate)

## 2017-08-09 NOTE — NURSING NOTE
When analyzing pt telemetry, there were changes that appeared as poss R on T. Tash KEN notified with 12 lead results. Images faxed to her with no new orders at this time. Pt VSS, asymptomatic, no other issues.

## 2017-08-09 NOTE — PROGRESS NOTES
"   LOS: 6 days    Patient Care Team:  Trevon Delarosa MD as PCP - General    Reason For Visit:  F/U NATALIO ON CKD.  Subjective     No acute issues. No new complaints. Does get panic attacks    Review of Systems:    Pulm: No soa   CV:  No CP      Objective       amLODIPine 10 mg Oral Daily   atorvastatin 10 mg Oral Daily   budesonide-formoterol 2 puff Inhalation BID - RT   ferric gluconate (FERRLECIT) IVPB 125 mg Intravenous Daily   furosemide 40 mg Oral BID   heparin (porcine) 5,000 Units Subcutaneous Q12H   hydrALAZINE 50 mg Oral TID   insulin lispro 0-7 Units Subcutaneous 4x Daily AC & at Bedtime   isosorbide mononitrate 60 mg Oral Daily   Pharmacy Meds to Bed Consult  Does not apply Daily   saccharomyces boulardii 250 mg Oral BID   sennosides-docusate sodium 2 tablet Oral Nightly            Vital Signs:  Blood pressure 142/66, pulse 83, temperature 98.5 °F (36.9 °C), temperature source Oral, resp. rate 16, height 64\" (162.6 cm), weight (!) 332 lb 1 oz (151 kg), SpO2 100 %.    Flowsheet Rows         First Filed Value    Admission Height  63\" (160 cm) Documented at 08/03/2017 1256    Admission Weight  (!)  385 lb (175 kg) Documented at 08/03/2017 1256          08/08 0701 - 08/09 0700  In: 280 [P.O.:120]  Out: -     Physical Exam:    General Appearance: NAD, alert and cooperative, Ox3  Eyes: PER, conjunctivae and sclerae normal, no icterus  Lungs: respirations regular and unlabored, no crepitus, clear to auscultation  Heart/CV: regular rhythm & normal rate, no murmur, no gallop, no rub and 1+ edema  Abdomen: not distended, soft, non-tender, no masses,  bowel sounds present. OBESE.  Skin: No rash, Warm and dry    Radiology: RENAL U/S UNREMARKABLE.            Labs: CPK 42. AUSTIN AND ANCA PENDING.    Results from last 7 days  Lab Units 08/09/17  0347 08/08/17  0354 08/07/17  0332   WBC 10*3/mm3 7.40 8.08 7.78   HEMOGLOBIN g/dL 7.4* 7.5* 7.6*   HEMATOCRIT % 24.7* 24.3* 25.5*   PLATELETS 10*3/mm3 361 402 425       Results " from last 7 days  Lab Units 08/09/17  0347 08/08/17  0902 08/07/17  0332 08/06/17  0519   SODIUM mmol/L 140 140 141 137   POTASSIUM mmol/L 4.2 4.2 4.1 4.4   CHLORIDE mmol/L 105 104 102 100   CO2 mmol/L 29.0 30.0 28.0 30.0   BUN mg/dL 59* 37* 57* 58*   CREATININE mg/dL 3.40* 2.70* 3.60* 3.60*   CALCIUM mg/dL 8.6* 8.4* 8.6* 8.7   PHOSPHORUS mg/dL 5.6* 4.6 5.4* 5.7*   ALBUMIN g/dL 3.00* 3.10* 3.10* 3.10*       Results from last 7 days  Lab Units 08/09/17  0347   GLUCOSE mg/dL 145*         Results from last 7 days  Lab Units 08/03/17  1338   ALK PHOS U/L 104*   BILIRUBIN mg/dL 0.2*   ALT (SGPT) U/L 15   AST (SGOT) U/L 13             Results from last 7 days  Lab Units 08/03/17  1430   COLOR UA  Yellow   CLARITY UA  Cloudy*   PH, URINE  7.5   SPECIFIC GRAVITY, URINE  1.010   GLUCOSE UA  Negative   KETONES UA  Negative   BILIRUBIN UA  Negative   PROTEIN UA  100 mg/dL (2+)*   BLOOD UA  Trace*   LEUKOCYTES UA  Large (3+)*   NITRITE UA  Negative       Estimated Creatinine Clearance: 23.9 mL/min (by C-G formula based on Cr of 3.4).    FINDINGS: The right kidney measures 12.9 cm in length. There are simple  cysts, the largest which measures 2.1 cm. The left kidney measures 12.7  cm in length. There is a small simple cyst related to the left kidney  measuring 1.7 cm. There is a second smaller 1.4 cm cyst.      IMPRESSION:  The kidneys are of normal size. There are simple cyst  but  no solid renal mass, stone or obstruction.        Assessment     Principal Problem:    Symptomatic anemia  Active Problems:    Asthma    Diabetes mellitus    Hypertension    Morbid obesity    Renal failure    Anxiety    Acute on chronic heart failure      Impression:   NATALIO - non oliguric - Scr improved from 3.6 to 2.7 today.   PROTEINURIA IMPROVED FROM WHAT IT WAS AT [facility] LAST MONTH. P/C ration 4.6. AUSTIN negative. ANCA -negative.    ANEMIA ON IV FE.    Recommendations:   Monitor I/O   Avoid nephrotoxic agents  Continue with diuresis.   Monitor  H/H  Will proceed with renal biopsy. NPO at midnight. Hold heparin SUB q midnight.         Jackson Welch MD  08/09/17  3:11 PM

## 2017-08-09 NOTE — PLAN OF CARE
Problem: Patient Care Overview (Adult)  Goal: Plan of Care Review  Outcome: Ongoing (interventions implemented as appropriate)    08/09/17 1031   Coping/Psychosocial Response Interventions   Plan Of Care Reviewed With patient   Outcome Evaluation   Outcome Summary/Follow up Plan Pt is having difficulty moving LE's and has increased pain with movement. L ankle / lower leg painful and swolen.          Problem: Inpatient Physical Therapy  Goal: Bed Mobility Goal LTG- PT  Outcome: Ongoing (interventions implemented as appropriate)    08/09/17 1031   Bed Mobility PT LTG   Bed Mobility PT LTG, Outcome goal ongoing       Goal: Transfer Training Goal 1 LTG- PT  Outcome: Ongoing (interventions implemented as appropriate)    08/09/17 1031   Transfer Training PT LTG   Transfer Training PT LTG, Outcome goal ongoing       Goal: Gait Training Goal LTG- PT  Outcome: Ongoing (interventions implemented as appropriate)    08/09/17 1031   Gait Training PT LTG   Gait Training Goal PT LTG, Outcome goal ongoing

## 2017-08-09 NOTE — THERAPY TREATMENT NOTE
Acute Care - Physical Therapy Treatment Note  [facility]     Patient Name: Joy Bueno  : 1950  MRN: [id number]  Today's Date: 2017  Onset of Illness/Injury or Date of Surgery Date: 17  Date of Referral to PT: 17  Referring Physician: SUELLEN Fischer    Admit Date: 8/3/2017    Visit Dx:    ICD-10-CM ICD-9-CM   1. Renal failure N19 586   2. Symptomatic anemia D64.9 285.9   3. Acute cystitis without hematuria N30.00 595.0   4. Morbid obesity due to excess calories E66.01 278.01   5. Adult failure to thrive syndrome R62.7 783.7   6. Impaired functional mobility, balance, gait, and endurance Z74.09 V49.89     Patient Active Problem List   Diagnosis   • Asthma   • Diabetes mellitus   • Hypertension   • Symptomatic anemia   • Morbid obesity   • Renal failure   • Anxiety   • Acute on chronic heart failure               Adult Rehabilitation Note       17 0940 17 0803 17 0802    Rehab Assessment/Intervention    Discipline physical therapist  -BD occupational therapist  -AC physical therapy assistant  -UD    Document Type therapy note (daily note)  -BD therapy note (daily note)  -AC therapy note (daily note)  -UD    Subjective Information agree to therapy  -BD agree to therapy;complains of;weakness   anxiety  -AC agree to therapy;complains of;weakness  -UD    Patient Effort, Rehab Treatment fair  -BD fair  -AC adequate  -UD    Symptoms Noted During/After Treatment increased pain;fatigue  -BD increased pain;fatigue  -AC increased pain;fatigue  -UD    Precautions/Limitations fall precautions  -BD fall precautions  -AC fall precautions  -UD    Specific Treatment Considerations Pt is weak and has difficulty moving LE's.  -BD pt reports she has axiety attacks, and pt showing signs of anxiety and reporting anxiety throughtout tx  -AC gets panic attacks  -UD    Patient Response to Treatment Pt c/o pain after tx  -BD      Recorded by [BD] Cha Enriquez, PT [AC] Mirna Orellana, OT [UD]  Yazmin Good, SHANA    Vital Signs    Pre Systolic BP Rehab 170  -BD      Pre Treatment Diastolic BP 83  -BD      Post Systolic BP Rehab 169   Reported BP to RN  -BD      Post Treatment Diastolic BP 82  -BD      Pretreatment Heart Rate (beats/min) 88  -BD      Pre SpO2 (%) 97  -BD      O2 Delivery Pre Treatment room air  -BD      O2 Delivery Intra Treatment room air  -BD      O2 Delivery Post Treatment room air  -BD  room air  -UD    Pre Patient Position Sitting  -BD  Supine  -UD    Intra Patient Position   Sitting  -UD    Post Patient Position Sitting  -BD  Sitting  -UD    Recorded by [BD] Cha Enriquez, PT  [UD] Yazmin Good PTA    Pain Assessment    Pain Assessment 0-10  -BD 0-10  -AC No/denies pain  -UD    Pain Score 6  -BD 0  -AC 0  -UD    Post Pain Score 6  -BD 8  -AC 8  -UD    Pain Type Chronic pain  -BD Chronic pain  -AC Chronic pain  -UD    Pain Location Knee   pt c/o pain in L proximal hip, knee and foot   -BD Knee  -AC Knee  -UD    Pain Orientation Left  -BD Left  -AC Left  -UD    Pain Intervention(s) Repositioned   PROM and AAROM  -BD Repositioned  -AC Repositioned  -UD    Response to Interventions improved  -BD      Recorded by [BD] Cha Enriquez, PT [AC] Mirna Orellana, OT [UD] Yazmin Good, SHANA    Cognitive Assessment/Intervention    Current Cognitive/Communication Assessment functional  -BD --   mild confusion  -AC     Orientation Status oriented x 4  -BD oriented x 4  -AC oriented x 4  -UD    Follows Commands/Answers Questions able to follow single-step instructions;100% of the time  -% of the time  -% of the time  -UD    Personal Safety WNL/WFL  -BD mild impairment  -AC     Personal Safety Interventions fall prevention program maintained  -BD fall prevention program maintained;gait belt;nonskid shoes/slippers when out of bed  -AC fall prevention program maintained  -UD    Recorded by [BD] Cha Enriquez, PT [AC] Mirna Orellana, OT [UD] Yazmin Good, SHANA    Bed Mobility,  Assessment/Treatment    Bed Mobility, Roll Left, [place]  minimum assist (75% patient effort)  -AC minimum assist (75% patient effort)  -UD    Bed Mobility, Roll Right, [place]  minimum assist (75% patient effort)  -AC minimum assist (75% patient effort)  -UD    Bed Mob, Supine to Sit, [place]   not tested   pt refused to try  -UD    Bed Mobility, Comment Pt up in chair  -BD rolled for sling placment; pt declined to sit EOB  -AC     Recorded by [BD] Cha Enriquez, PT [AC] Mirna Orellana, OT [UD] Yazmin Good, PTA    Transfer Assessment/Treatment    Transfers, Bed-Chair [place]  dependent (less than 25% patient effort)  -AC unable to perform  -UD    Transfers, Bed-Chair-Bed, Assist Device  mechanical lift  -AC mechanical lift  -UD    Transfers, Sit-Stand [place]  unable to perform  -AC unable to perform  -UD    Transfers, Stand-Sit [place]  dependent (less than 25% patient effort)  -AC dependent (less than 25% patient effort)  -UD    Transfer, Comment Pt received therapy exercises in chair. Upright sitting and reclined.  -BD 2 attempts sSTS, pt unable to clear bottom from chair  -AC     Recorded by [BD] Cha Enriquez, PT [AC] Mirna Orellana, OT [UD] Yazmin Good, PTA    Gait Assessment/Treatment    Gait, [place] Level unable to perform  -BD  not appropriate to assess  -UD    Recorded by [BD] Cha Enriquez, PT  [UD] Yazmin Good, PTA    Functional Mobility    Functional Mobility- Ind. Level unable to perform  -BD unable to perform  -AC     Recorded by [BD] Cha Enriquez, PT [AC] Mirna Orellana, OT     Grooming Assessment/Training    Grooming Assess/Train, Position  sitting  -AC     Grooming Assess/Train, [place] Level  set up required  -AC     Grooming Assess/Train, Comment  brush teeth  -AC     Recorded by  [AC] Mirna Orellana, OT     Therapy Exercises    Bilateral Lower Extremities AROM:;ankle pumps/circles;glut sets;sitting;AAROM:;hip ER;hip IR;knee flexion;quad  sets   PT explained to pt pressure relief movements while in chair.  -BD  AROM:;10 reps;sitting;supine  -UD    Bilateral Upper Extremity AAROM:;shoulder abduction/adduction;shoulder extension/flexion  -BD AROM:;15 reps;elbow flexion/extension;hand pumps;shoulder extension/flexion;shoulder horizontal abd/add   multiple cues to complete reps  -AC     Recorded by [BD] Cha Enriquez, PT [AC] Mirna Orellana, OT [UD] Yazmin Good PTA    Positioning and Restraints    Pre-Treatment Position sitting in chair/recliner  -BD in bed  -AC in bed  -UD    Post Treatment Position chair  -BD chair  -AC chair  -UD    In Chair notified nsg;reclined;call light within reach;encouraged to call for assist;exit alarm on;with other staff;waffle cushion;on mechanical lift sling;legs elevated;heels elevated  -BD reclined;call light within reach;encouraged to call for assist;exit alarm on;on mechanical lift sling  -AC notified nsg;reclined;sitting;call light within reach;exit alarm on;legs elevated  -UD    Recorded by [BD] Cha Enriquez, PT [AC] Mirna Orellana, OT [UD] Yazmin Good PTA      User Key  (r) = Recorded By, (t) = Taken By, (c) = Cosigned By    Initials Name Effective Dates    AC Mirna Orellana, OT 06/23/15 -     UD Yazmin Good, PTA 06/22/15 -     BD Cha Enriquez, PT 06/13/16 -                 IP PT Goals       08/09/17 1031 08/07/17 0853 08/04/17 1103    Bed Mobility PT LTG    Bed Mobility PT LTG, Date Established   08/04/17  -    Bed Mobility PT LTG, Time to Achieve   2 wks  -EH    Bed Mobility PT LTG, Activity Type   supine to sit/sit to supine  -    Bed Mobility PT LTG, [place] Level   moderate assist (50% patient effort);2 person assist required  -    Bed Mobility PT LTG, Outcome goal ongoing  -BD goal ongoing  -UD     Transfer Training PT LTG    Transfer Training PT LTG, Date Established   08/04/17  -    Transfer Training PT LTG, Time to Achieve   2 wks  -EH    Transfer Training PT LTG, Activity  Type   sit to stand/stand to sit  -EH    Transfer Training PT LTG, [place] Level   moderate assist (50% patient effort);2 person assist required  -EH    Transfer Training PT LTG, Assist Device   walker, rolling  -EH    Transfer Training PT LTG, Outcome goal ongoing  -BD goal ongoing  -UD     Gait Training PT LTG    Gait Training Goal PT LTG, Date Established   08/04/17  -EH    Gait Training Goal PT LTG, Time to Achieve   2 wks  -EH    Gait Training Goal PT LTG, [place] Level   moderate assist (50% patient effort);2 person assist required  -EH    Gait Training Goal PT LTG, Assist Device   walker, rolling  -EH    Gait Training Goal PT LTG, Distance to Achieve   10  -EH    Gait Training Goal PT LTG, Outcome goal ongoing  -BD goal ongoing  -UD       User Key  (r) = Recorded By, (t) = Taken By, (c) = Cosigned By    Initials Name Provider Type    ADRIANE Good, PTA Physical Therapy Assistant     Justa Guerra, PT Physical Therapist    BD Cha Enriquez, PT Physical Therapist          Physical Therapy Education     Title: PT OT SLP Therapies (Active)     Topic: Physical Therapy (Active)     Point: Mobility training (Active)    Learning Progress Summary    Learner Readiness Method Response Comment Documented by Status   Patient Acceptance E,D NR   08/09/17 1030 Active    Acceptance E,D VU,NR   08/07/17 0853 Done               Point: Home exercise program (Active)    Learning Progress Summary    Learner Readiness Method Response Comment Documented by Status   Patient Acceptance E,D NR   08/09/17 1030 Active    Acceptance E,D VU,NR   08/07/17 0853 Done               Point: Body mechanics (Active)    Learning Progress Summary    Learner Readiness Method Response Comment Documented by Status   Patient Acceptance E,D NR   08/09/17 1030 Active    Acceptance E,D VU,NR   08/07/17 0853 Done               Point: Precautions (Active)    Learning Progress Summary    Learner Readiness Method Response  Comment Documented by Status   Patient Acceptance E,D NR   08/09/17 1030 Active    Acceptance E,D VU,NR   08/07/17 0853 Done    Acceptance E NR Pt educated on POC and need for OOB mobility to prevent functional decline, PNA. Pt continues to refuse mobility assessment  08/04/17 1101 Active                      User Key     Initials Effective Dates Name Provider Type Discipline     06/22/15 -  Yazmin Good, PTA Physical Therapy Assistant PT     06/19/15 -  Justa Guerra, PT Physical Therapist PT     06/13/16 -  hCa Enriquez, PT Physical Therapist PT                    PT Recommendation and Plan  Anticipated Discharge Disposition: skilled nursing facility  PT Frequency: daily  Plan of Care Review  Plan Of Care Reviewed With: patient  Outcome Summary/Follow up Plan: Pt is having difficulty moving LE's and has increased pain with movement. L ankle / lower leg painful and swolen.           Outcome Measures       08/09/17 0940 08/07/17 0803 08/07/17 0802    How much help from another person do you currently need...    Turning from your back to your side while in flat bed without using bedrails? 2  -BD  3  -UD    Moving from lying on back to sitting on the side of a flat bed without bedrails? 2  -BD  1  -UD    Moving to and from a bed to a chair (including a wheelchair)? 1  -BD  1  -UD    Standing up from a chair using your arms (e.g., wheelchair, bedside chair)? 1  -BD  1  -UD    Climbing 3-5 steps with a railing? 1  -BD  1  -UD    To walk in hospital room? 1  -BD  1  -UD    AM-PAC 6 Clicks Score 8  -BD  8  -UD    How much help from another is currently needed...    Putting on and taking off regular lower body clothing?  1  -AC     Bathing (including washing, rinsing, and drying)  1  -AC     Toileting (which includes using toilet bed pan or urinal)  1  -AC     Putting on and taking off regular upper body clothing  2  -AC     Taking care of personal grooming (such as brushing teeth)  3  -AC     Eating  meals  3  -AC     Score  11  -AC     Functional Assessment    Outcome Measure Options AM-PAC 6 Clicks Basic Mobility (PT)  -BD AM-PAC 6 Clicks Daily Activity (OT)  -AC       User Key  (r) = Recorded By, (t) = Taken By, (c) = Cosigned By    Initials Name Provider Type    AC Mirna Orellana, OT Occupational Therapist    ADRIANE Good, PTA Physical Therapy Assistant    FARHEEN Enriquez, PT Physical Therapist           Time Calculation:         PT Charges       08/09/17 1035          Time Calculation    Start Time 0940  -BD      PT Received On 08/09/17  -BD      PT Goal Re-Cert Due Date 08/14/17  -FARHEEN      Time Calculation- PT    Total Timed Code Minutes- PT 30 minute(s)  -BD        User Key  (r) = Recorded By, (t) = Taken By, (c) = Cosigned By    Initials Name Provider Type    FARHEEN Enriquez, PT Physical Therapist          Therapy Charges for Today     Code Description Service Date Service Provider Modifiers Qty    62738713293 HC PT THER PROC EA 15 MIN 8/9/2017 Cha Enriquez, PT GP 2          PT G-Codes  Outcome Measure Options: AM-PAC 6 Clicks Basic Mobility (PT)    Cha Enriquez, PT  8/9/2017

## 2017-08-10 ENCOUNTER — APPOINTMENT (OUTPATIENT)
Dept: CT IMAGING | Facility: HOSPITAL | Age: 67
End: 2017-08-10

## 2017-08-10 LAB
ABO GROUP BLD: NORMAL
ALBUMIN SERPL-MCNC: 3.3 G/DL (ref 3.2–4.8)
ANION GAP SERPL CALCULATED.3IONS-SCNC: 8 MMOL/L (ref 3–11)
APTT PPP: 33.4 SECONDS (ref 24–31)
BLD GP AB SCN SERPL QL: NEGATIVE
BUN BLD-MCNC: 58 MG/DL (ref 9–23)
BUN/CREAT SERPL: 18.7 (ref 7–25)
CALCIUM SPEC-SCNC: 8.9 MG/DL (ref 8.7–10.4)
CHLORIDE SERPL-SCNC: 105 MMOL/L (ref 99–109)
CLOSURE TME COLL+ADP BLD: 107 SECONDS (ref 54–123)
CLOSURE TME COLL+EPINEP BLD: 85 SECONDS (ref 72–192)
CO2 SERPL-SCNC: 28 MMOL/L (ref 20–31)
CREAT BLD-MCNC: 3.1 MG/DL (ref 0.6–1.3)
DEPRECATED RDW RBC AUTO: 49.7 FL (ref 37–54)
ERYTHROCYTE [DISTWIDTH] IN BLOOD BY AUTOMATED COUNT: 15.3 % (ref 11.3–14.5)
GFR SERPL CREATININE-BSD FRML MDRD: 18 ML/MIN/1.73
GLUCOSE BLD-MCNC: 140 MG/DL (ref 70–100)
GLUCOSE BLDC GLUCOMTR-MCNC: 123 MG/DL (ref 70–130)
GLUCOSE BLDC GLUCOMTR-MCNC: 130 MG/DL (ref 70–130)
GLUCOSE BLDC GLUCOMTR-MCNC: 137 MG/DL (ref 70–130)
GLUCOSE BLDC GLUCOMTR-MCNC: 217 MG/DL (ref 70–130)
HCT VFR BLD AUTO: 24.3 % (ref 34.5–44)
HCT VFR BLD AUTO: 24.6 % (ref 34.5–44)
HGB BLD-MCNC: 7.6 G/DL (ref 11.5–15.5)
INR PPP: 0.98
MCH RBC QN AUTO: 27.6 PG (ref 27–31)
MCHC RBC AUTO-ENTMCNC: 30.9 G/DL (ref 32–36)
MCV RBC AUTO: 89.5 FL (ref 80–99)
PHOSPHATE SERPL-MCNC: 5 MG/DL (ref 2.4–5.1)
PLATELET # BLD AUTO: 381 10*3/MM3 (ref 150–450)
PMV BLD AUTO: 9.1 FL (ref 6–12)
POTASSIUM BLD-SCNC: 4.2 MMOL/L (ref 3.5–5.5)
PROTHROMBIN TIME: 10.7 SECONDS (ref 9.6–11.5)
RBC # BLD AUTO: 2.75 10*6/MM3 (ref 3.89–5.14)
RH BLD: POSITIVE
SODIUM BLD-SCNC: 141 MMOL/L (ref 132–146)
WBC NRBC COR # BLD: 10.83 10*3/MM3 (ref 3.5–10.8)

## 2017-08-10 PROCEDURE — 0TB13ZX EXCISION OF LEFT KIDNEY, PERCUTANEOUS APPROACH, DIAGNOSTIC: ICD-10-PCS | Performed by: RADIOLOGY

## 2017-08-10 PROCEDURE — 80069 RENAL FUNCTION PANEL: CPT | Performed by: INTERNAL MEDICINE

## 2017-08-10 PROCEDURE — 85576 BLOOD PLATELET AGGREGATION: CPT | Performed by: INTERNAL MEDICINE

## 2017-08-10 PROCEDURE — 86901 BLOOD TYPING SEROLOGIC RH(D): CPT | Performed by: INTERNAL MEDICINE

## 2017-08-10 PROCEDURE — 25010000002 MEPERIDINE PER 100 MG: Performed by: INTERNAL MEDICINE

## 2017-08-10 PROCEDURE — 88350 IMFLUOR EA ADDL 1ANTB STN PX: CPT | Performed by: INTERNAL MEDICINE

## 2017-08-10 PROCEDURE — 25010000002 MORPHINE SULFATE (PF) 2 MG/ML SOLUTION: Performed by: INTERNAL MEDICINE

## 2017-08-10 PROCEDURE — 88346 IMFLUOR 1ST 1ANTB STAIN PX: CPT | Performed by: INTERNAL MEDICINE

## 2017-08-10 PROCEDURE — 88313 SPECIAL STAINS GROUP 2: CPT | Performed by: INTERNAL MEDICINE

## 2017-08-10 PROCEDURE — 85610 PROTHROMBIN TIME: CPT | Performed by: INTERNAL MEDICINE

## 2017-08-10 PROCEDURE — 94799 UNLISTED PULMONARY SVC/PX: CPT

## 2017-08-10 PROCEDURE — 25010000002 HEPARIN (PORCINE) PER 1000 UNITS: Performed by: INTERNAL MEDICINE

## 2017-08-10 PROCEDURE — 99233 SBSQ HOSP IP/OBS HIGH 50: CPT | Performed by: NURSE PRACTITIONER

## 2017-08-10 PROCEDURE — 25010000002 PROMETHAZINE PER 50 MG: Performed by: INTERNAL MEDICINE

## 2017-08-10 PROCEDURE — 86900 BLOOD TYPING SEROLOGIC ABO: CPT | Performed by: INTERNAL MEDICINE

## 2017-08-10 PROCEDURE — 77012 CT SCAN FOR NEEDLE BIOPSY: CPT

## 2017-08-10 PROCEDURE — 94640 AIRWAY INHALATION TREATMENT: CPT

## 2017-08-10 PROCEDURE — 85730 THROMBOPLASTIN TIME PARTIAL: CPT | Performed by: INTERNAL MEDICINE

## 2017-08-10 PROCEDURE — 85014 HEMATOCRIT: CPT | Performed by: INTERNAL MEDICINE

## 2017-08-10 PROCEDURE — 88348 ELECTRON MICROSCOPY DX: CPT | Performed by: INTERNAL MEDICINE

## 2017-08-10 PROCEDURE — 86850 RBC ANTIBODY SCREEN: CPT | Performed by: INTERNAL MEDICINE

## 2017-08-10 PROCEDURE — 82962 GLUCOSE BLOOD TEST: CPT

## 2017-08-10 PROCEDURE — 85027 COMPLETE CBC AUTOMATED: CPT | Performed by: INTERNAL MEDICINE

## 2017-08-10 PROCEDURE — 25010000002 NA FERRIC GLUC CPLX PER 12.5 MG: Performed by: INTERNAL MEDICINE

## 2017-08-10 PROCEDURE — 88305 TISSUE EXAM BY PATHOLOGIST: CPT | Performed by: INTERNAL MEDICINE

## 2017-08-10 RX ORDER — ESCITALOPRAM OXALATE 20 MG/1
10 TABLET ORAL DAILY
Status: DISCONTINUED | OUTPATIENT
Start: 2017-08-10 | End: 2017-08-12 | Stop reason: HOSPADM

## 2017-08-10 RX ORDER — ATROPINE SULFATE 0.4 MG/ML
0.4 AMPUL (ML) INJECTION ONCE
Status: COMPLETED | OUTPATIENT
Start: 2017-08-10 | End: 2017-08-10

## 2017-08-10 RX ORDER — MORPHINE SULFATE 2 MG/ML
2 INJECTION, SOLUTION INTRAMUSCULAR; INTRAVENOUS EVERY 4 HOURS PRN
Status: DISCONTINUED | OUTPATIENT
Start: 2017-08-10 | End: 2017-08-12 | Stop reason: HOSPADM

## 2017-08-10 RX ORDER — CLONAZEPAM 0.5 MG/1
0.5 TABLET ORAL 2 TIMES DAILY PRN
Status: DISCONTINUED | OUTPATIENT
Start: 2017-08-10 | End: 2017-08-11

## 2017-08-10 RX ORDER — PROMETHAZINE HYDROCHLORIDE 25 MG/ML
12.5 INJECTION, SOLUTION INTRAMUSCULAR; INTRAVENOUS ONCE
Status: COMPLETED | OUTPATIENT
Start: 2017-08-10 | End: 2017-08-10

## 2017-08-10 RX ORDER — MEPERIDINE HYDROCHLORIDE 25 MG/ML
25 INJECTION INTRAMUSCULAR; INTRAVENOUS; SUBCUTANEOUS ONCE AS NEEDED
Status: COMPLETED | OUTPATIENT
Start: 2017-08-10 | End: 2017-08-10

## 2017-08-10 RX ORDER — LIDOCAINE HYDROCHLORIDE 10 MG/ML
10 INJECTION, SOLUTION INFILTRATION; PERINEURAL ONCE
Status: COMPLETED | OUTPATIENT
Start: 2017-08-10 | End: 2017-08-10

## 2017-08-10 RX ADMIN — INSULIN LISPRO 3 UNITS: 100 INJECTION, SOLUTION INTRAVENOUS; SUBCUTANEOUS at 21:38

## 2017-08-10 RX ADMIN — ISOSORBIDE MONONITRATE 60 MG: 60 TABLET, EXTENDED RELEASE ORAL at 08:19

## 2017-08-10 RX ADMIN — HYDROCORTISONE 2.5%: 25 CREAM TOPICAL at 17:46

## 2017-08-10 RX ADMIN — MORPHINE SULFATE 2 MG: 2 INJECTION, SOLUTION INTRAMUSCULAR; INTRAVENOUS at 08:19

## 2017-08-10 RX ADMIN — CLONAZEPAM 0.5 MG: 0.5 TABLET ORAL at 12:59

## 2017-08-10 RX ADMIN — MEPERIDINE HYDROCHLORIDE 25 MG: 25 INJECTION, SOLUTION INTRAMUSCULAR; INTRAVENOUS; SUBCUTANEOUS at 13:00

## 2017-08-10 RX ADMIN — Medication 250 MG: at 08:19

## 2017-08-10 RX ADMIN — BUDESONIDE AND FORMOTEROL FUMARATE DIHYDRATE 2 PUFF: 80; 4.5 AEROSOL RESPIRATORY (INHALATION) at 09:19

## 2017-08-10 RX ADMIN — ATROPINE SULFATE 0.4 MG: 0.4 INJECTION, SOLUTION INTRAMUSCULAR; INTRAVENOUS; SUBCUTANEOUS at 13:00

## 2017-08-10 RX ADMIN — HYDRALAZINE HYDROCHLORIDE 50 MG: 50 TABLET ORAL at 12:59

## 2017-08-10 RX ADMIN — HYDRALAZINE HYDROCHLORIDE 50 MG: 50 TABLET ORAL at 21:38

## 2017-08-10 RX ADMIN — LIDOCAINE HYDROCHLORIDE 10 ML: 10 INJECTION, SOLUTION INFILTRATION; PERINEURAL at 14:20

## 2017-08-10 RX ADMIN — Medication 250 MG: at 17:46

## 2017-08-10 RX ADMIN — FUROSEMIDE 40 MG: 40 TABLET ORAL at 17:46

## 2017-08-10 RX ADMIN — HYDROCORTISONE 2.5%: 25 CREAM TOPICAL at 13:00

## 2017-08-10 RX ADMIN — MORPHINE SULFATE 2 MG: 2 INJECTION, SOLUTION INTRAMUSCULAR; INTRAVENOUS at 04:10

## 2017-08-10 RX ADMIN — HEPARIN SODIUM 5000 UNITS: 5000 INJECTION, SOLUTION INTRAVENOUS; SUBCUTANEOUS at 21:38

## 2017-08-10 RX ADMIN — Medication 2 TABLET: at 21:38

## 2017-08-10 RX ADMIN — ESCITALOPRAM OXALATE 10 MG: 20 TABLET ORAL at 17:46

## 2017-08-10 RX ADMIN — AMLODIPINE BESYLATE 10 MG: 10 TABLET ORAL at 08:20

## 2017-08-10 RX ADMIN — PROMETHAZINE HYDROCHLORIDE 12.5 MG: 25 INJECTION INTRAMUSCULAR; INTRAVENOUS at 12:59

## 2017-08-10 RX ADMIN — ATORVASTATIN CALCIUM 10 MG: 10 TABLET, FILM COATED ORAL at 08:19

## 2017-08-10 RX ADMIN — HYDROCODONE BITARTRATE AND ACETAMINOPHEN 1 TABLET: 5; 325 TABLET ORAL at 21:38

## 2017-08-10 RX ADMIN — BUDESONIDE AND FORMOTEROL FUMARATE DIHYDRATE 2 PUFF: 80; 4.5 AEROSOL RESPIRATORY (INHALATION) at 19:33

## 2017-08-10 RX ADMIN — SODIUM CHLORIDE 125 MG: 9 INJECTION, SOLUTION INTRAVENOUS at 08:19

## 2017-08-10 NOTE — PLAN OF CARE
Problem: Patient Care Overview (Adult)  Goal: Plan of Care Review  Outcome: Ongoing (interventions implemented as appropriate)  Goal: Adult Individualization and Mutuality  Outcome: Ongoing (interventions implemented as appropriate)  Goal: Discharge Needs Assessment  Outcome: Ongoing (interventions implemented as appropriate)    Problem: Skin Integrity Impairment, Risk/Actual (Adult)  Goal: Skin Integrity/Wound Healing  Outcome: Ongoing (interventions implemented as appropriate)    Problem: Pressure Ulcer Risk (Saurav Scale) (Adult,Obstetrics,Pediatric)  Goal: Skin Integrity  Outcome: Ongoing (interventions implemented as appropriate)

## 2017-08-10 NOTE — PROGRESS NOTES
[facility] Medicine Services  INPATIENT PROGRESS NOTE    Date of Admission: 8/3/2017  Length of Stay: 7  Primary Care Physician: Trevon Delarosa MD    Subjective   CC: FU anemia, renal failure  HPI:    Patient resting in bed with brother at bedside this morning around noon.  Anxious about her renal biopsy that is scheduled today.  States that she has been having panic attacks recently and her valium hasn't been helping.        Review Of Systems:   Review of Systems   Constitutional: Positive for fatigue.   HENT: Negative.    Eyes: Negative.    Respiratory: Negative.    Cardiovascular: Negative.    Gastrointestinal: Negative.    Endocrine: Negative.    Genitourinary: Negative.    Musculoskeletal: Positive for arthralgias.   Allergic/Immunologic: Negative.    Neurological: Positive for weakness.   Hematological: Negative.    Psychiatric/Behavioral: The patient is nervous/anxious.          Objective      Temp:  [98.4 °F (36.9 °C)-98.6 °F (37 °C)] 98.6 °F (37 °C)  Heart Rate:  [91-92] 91  Resp:  [16-18] 17  BP: (142-178)/(66-81) 161/75  Physical Exam     General: Resting in bed, NAD, brother at bedside.  HEENT: Normocephalic, mucous membranes moist, pupils equal  Neck: Supple, trachea midline  Cardiovascular: Regular rate and rhythm, S1-S2, no murmur  Respiratory: decreased in bases, even unlabored breathing on nasal cannula  Abdomen: Soft, nontender, nondistended, bowel sounds +, obese  Skin: Clean, dry, intact, no lesions or sores  Extremities: SUTTON, Symmetrical, pulses +, 2+ pitting edema  Neuro: Alert but anxious, oriented ×4, appropriate and cooperative    Results Review:    I have reviewed the labs, radiology results and diagnostic studies.      Results from last 7 days  Lab Units 08/10/17  0451   WBC 10*3/mm3 10.83*   HEMOGLOBIN g/dL 7.6*   PLATELETS 10*3/mm3 381       Results from last 7 days  Lab Units 08/10/17  0455   SODIUM mmol/L 141   POTASSIUM mmol/L 4.2   CHLORIDE mmol/L 105    CO2 mmol/L 28.0   BUN mg/dL 58*   CREATININE mg/dL 3.10*   GLUCOSE mg/dL 140*   CALCIUM mg/dL 8.9       Culture Data: Cultures:    Urine Culture   Date Value Ref Range Status   08/03/2017 >100,000 CFU/mL Gram Negative Bacilli (A)  Preliminary       Radiology Data:     I have reviewed the medications.    amLODIPine 10 mg Oral Daily   atorvastatin 10 mg Oral Daily   budesonide-formoterol 2 puff Inhalation BID - RT   escitalopram 10 mg Oral Daily   ferric gluconate (FERRLECIT) IVPB 125 mg Intravenous Daily   furosemide 40 mg Oral BID   heparin (porcine) 5,000 Units Subcutaneous Q12H   hydrALAZINE 50 mg Oral TID   hydrocortisone  Rectal BID   insulin lispro 0-7 Units Subcutaneous 4x Daily AC & at Bedtime   isosorbide mononitrate 60 mg Oral Daily   Pharmacy Meds to Bed Consult  Does not apply Daily   saccharomyces boulardii 250 mg Oral BID   sennosides-docusate sodium 2 tablet Oral Nightly         Assessment/Plan     Problem List  Hospital Problem List     * (Principal)Symptomatic anemia    Hypertension    Renal failure    Overview Signed 8/3/2017  4:58 PM by SUELLEN Richey     Unclear whether this is a chronic problem.           Asthma    Diabetes mellitus    Morbid obesity    Anxiety    Acute on chronic heart failure               Assessment/Plan:    NATALIO on CKD  - baseline unclear, but patient recently seen at [facility] with cr elevated to 4.6 (baseline reported 1.5)  - creatinine back up today.  Awaiting renal biopsy around 2 pm today  - proteinuria  - avoid nephrotoxins    Anxiety-  -will start lexapro 10 mg daily  -switch from valium to klonopin BID PRN    Anemia  - chronicity unclear, will check PCP and  records for prior hemoglobin levels  - patient denies melena or brbpr, no prior endoscopy  - iv iron replacement while hospitalized  - unclear if secondary to CKD vs GI loss  - will need colonoscopy +/- EGD IP vs OP, discussed with GI   - patient says she would not want to do colonoscopy currently, would  like to feel stronger first      DMII  - a1c 6.0  -continue correction, adequate control    Morbid obesity    Left leg pain and difficulty ambulating  - per OSH dc summary, duplex LE negative  - continue PT/OT  - OOBTC    UTI (POA), Ecoli  - s/p rocephin x 7 days  - probiotics  -Urine culture + for e-coli    Constipation  - stool softener      VTE proph: SQ heparin  Discharge Planning: I expect patient to be discharged back to [facility] once medically ready.     Mary Fischer, SUELLEN   08/10/17   1:49 PM

## 2017-08-10 NOTE — PROGRESS NOTES
Adult Nutrition  Assessment/PES    Patient Name:  Joy Bueno  YOB: 1950  MRN: [id number]  Admit Date:  8/3/2017    Assessment Date:  8/10/2017        Reason for Assessment       08/10/17 1223    Reason for Assessment    Reason For Assessment/Visit length of stay    Time Spent (min) 10                        Nutrition Prescription Ordered       08/10/17 1224    Nutrition Prescription PO    Current PO Diet NPO            Evaluation of Received Nutrient/Fluid Intake       08/10/17 1224    PO Evaluation    Number of Meals 10    % PO Intake 80   prior to NPO status today              Problem/Interventions:        Problem 1       08/10/17 1224    Nutrition Diagnoses Problem 1    Problem 1 Nutrition Appropriate for Condition at this Time    Etiology (related to) Factors Affecting Nutrition    Appetite Good    Signs/Symptoms (evidenced by) PO Intake    Percent (%) intake recorded 80 %   prior to NPO status today    Over number of meals 10                    Intervention Goal       08/10/17 1225    Intervention Goal    General Nutrition support treatment            Nutrition Intervention       08/10/17 1225    Nutrition Intervention    RD/Tech Action Follow Tx progress              Education/Evaluation       08/10/17 1225    Monitor/Evaluation    Monitor Per protocol            Electronically signed by:  Nancy Lal RD  08/10/17 12:25 PM

## 2017-08-10 NOTE — PLAN OF CARE
Problem: Patient Care Overview (Adult)  Goal: Plan of Care Review  Outcome: Ongoing (interventions implemented as appropriate)    08/10/17 0508   Coping/Psychosocial Response Interventions   Plan Of Care Reviewed With patient   Patient Care Overview   Progress no change       Goal: Adult Individualization and Mutuality  Outcome: Ongoing (interventions implemented as appropriate)    08/09/17 1614   Individualization   Patient Specific Preferences Pt stays very cold, needs a lot of covers. Pt sometimes gets very anxious, taught relaxation techniques.    Patient Specific Goals Pt hopeful her BUN and creatinine will start going back down.    Patient Specific Interventions Help with relaxation techniques. Explain POC in a way pt can understand.        Goal: Discharge Needs Assessment  Outcome: Ongoing (interventions implemented as appropriate)    08/04/17 1017 08/04/17 1020 08/06/17 0311   Discharge Needs Assessment   Concerns To Be Addressed --  --  discharge planning concerns   Readmission Within The Last 30 Days no previous admission in last 30 days;other (see comments)  (Pt. was at [facility] 2 weeks ago) --  --    Discharge Facility/Level Of Care Needs nursing facility, skilled --  --    Current Discharge Risk physical impairment --  --    Discharge Disposition --  --  still a patient   Discharge Planning Comments Pt. was transferred to [facility] after being at [facility] for approximately 2 weeks. Pt. plans to return to rehab when medically ready for discharge. Pt. may need help with transportation, but this will need to be clarified closer to discharge date. Pt. did not have home health services and has a walker at her home, but stated she doesn't use it. --  --    Living Environment   Transportation Available car;van, wheelchair accessible --  --    Self-Care   Equipment Currently Used at Home --  none  (per pt report) --          Problem: Skin Integrity Impairment, Risk/Actual (Adult)  Goal: Skin  Integrity/Wound Healing  Outcome: Ongoing (interventions implemented as appropriate)    08/10/17 0508   Skin Integrity Impairment, Risk/Actual (Adult)   Skin Integrity/Wound Healing making progress toward outcome         Problem: Pressure Ulcer Risk (Saurav Scale) (Adult,Obstetrics,Pediatric)  Goal: Skin Integrity  Outcome: Ongoing (interventions implemented as appropriate)    08/10/17 0508   Pressure Ulcer Risk (Saurav Scale) (Adult,Obstetrics,Pediatric)   Skin Integrity making progress toward outcome

## 2017-08-10 NOTE — PROGRESS NOTES
Continued Stay Note  [facility]     Patient Name: Joy Bueno  MRN: [id number]  Today's Date: 8/10/2017    Admit Date: 8/3/2017          Discharge Plan       08/10/17 1219    Case Management/Social Work Plan    Plan discharge plan    Patient/Family In Agreement With Plan yes    Additional Comments Spoke with Dr Ackerman and pt not medically ready for discharge today. Ambulance rescheduled from [facility] to [facility] tomorrow, 8/11 if pt medically ready for discharge. Heather Nunes liashae for [facility] aware pt not medically ready for discharge today. CM will cont to follow.              Discharge Codes     None        Expected Discharge Date and Time     Expected Discharge Date Expected Discharge Time    Aug 12, 2017             Lucy Winkler RN

## 2017-08-10 NOTE — PROCEDURES
Kidney biopsy was performed  Timeout done  Patient wanted us to proceed with a biopsy.  CT scan located the kidney 17 cm below the skin  Aseptic precautions were made  Lidocaine 1% was infused locally  #11 blade incision made less than a centimeter  2 passes made  2 pieces taken out  Pressure applied  Bandage applied  Postbiopsy instruction given to the patient.  Bedrest enforced  RN informed of the biopsy and orders.  Postbiopsy CT scan was done and seen  No significant bleeding was noted  Patient tolerated the procedure well

## 2017-08-10 NOTE — NURSING NOTE
Pt tolerated procedure well, report called to nurse on 5H.  Pt. Returned to room per hospital transport.

## 2017-08-11 LAB
ANION GAP SERPL CALCULATED.3IONS-SCNC: 5 MMOL/L (ref 3–11)
BUN BLD-MCNC: 55 MG/DL (ref 9–23)
BUN/CREAT SERPL: 19.6 (ref 7–25)
CALCIUM SPEC-SCNC: 8.8 MG/DL (ref 8.7–10.4)
CHLORIDE SERPL-SCNC: 104 MMOL/L (ref 99–109)
CO2 SERPL-SCNC: 29 MMOL/L (ref 20–31)
CREAT BLD-MCNC: 2.8 MG/DL (ref 0.6–1.3)
GFR SERPL CREATININE-BSD FRML MDRD: 20 ML/MIN/1.73
GLUCOSE BLD-MCNC: 106 MG/DL (ref 70–100)
GLUCOSE BLDC GLUCOMTR-MCNC: 134 MG/DL (ref 70–130)
GLUCOSE BLDC GLUCOMTR-MCNC: 145 MG/DL (ref 70–130)
GLUCOSE BLDC GLUCOMTR-MCNC: 148 MG/DL (ref 70–130)
GLUCOSE BLDC GLUCOMTR-MCNC: 175 MG/DL (ref 70–130)
HCT VFR BLD AUTO: 24.2 % (ref 34.5–44)
POTASSIUM BLD-SCNC: 4.2 MMOL/L (ref 3.5–5.5)
SODIUM BLD-SCNC: 138 MMOL/L (ref 132–146)

## 2017-08-11 PROCEDURE — 85014 HEMATOCRIT: CPT | Performed by: INTERNAL MEDICINE

## 2017-08-11 PROCEDURE — 94760 N-INVAS EAR/PLS OXIMETRY 1: CPT

## 2017-08-11 PROCEDURE — 99231 SBSQ HOSP IP/OBS SF/LOW 25: CPT | Performed by: NURSE PRACTITIONER

## 2017-08-11 PROCEDURE — 25010000002 NA FERRIC GLUC CPLX PER 12.5 MG: Performed by: INTERNAL MEDICINE

## 2017-08-11 PROCEDURE — 82962 GLUCOSE BLOOD TEST: CPT

## 2017-08-11 PROCEDURE — 94799 UNLISTED PULMONARY SVC/PX: CPT

## 2017-08-11 PROCEDURE — 25010000002 HEPARIN (PORCINE) PER 1000 UNITS: Performed by: INTERNAL MEDICINE

## 2017-08-11 PROCEDURE — 80048 BASIC METABOLIC PNL TOTAL CA: CPT | Performed by: INTERNAL MEDICINE

## 2017-08-11 PROCEDURE — 94640 AIRWAY INHALATION TREATMENT: CPT

## 2017-08-11 PROCEDURE — 25010000002 ONDANSETRON PER 1 MG: Performed by: NURSE PRACTITIONER

## 2017-08-11 RX ORDER — AMLODIPINE BESYLATE 5 MG/1
5 TABLET ORAL DAILY
Status: DISCONTINUED | OUTPATIENT
Start: 2017-08-12 | End: 2017-08-12 | Stop reason: HOSPADM

## 2017-08-11 RX ORDER — CLONAZEPAM 0.5 MG/1
0.25 TABLET ORAL 2 TIMES DAILY PRN
Status: DISCONTINUED | OUTPATIENT
Start: 2017-08-11 | End: 2017-08-12 | Stop reason: HOSPADM

## 2017-08-11 RX ADMIN — HEPARIN SODIUM 5000 UNITS: 5000 INJECTION, SOLUTION INTRAVENOUS; SUBCUTANEOUS at 09:01

## 2017-08-11 RX ADMIN — ESCITALOPRAM OXALATE 10 MG: 20 TABLET ORAL at 09:01

## 2017-08-11 RX ADMIN — HYDRALAZINE HYDROCHLORIDE 50 MG: 50 TABLET ORAL at 06:21

## 2017-08-11 RX ADMIN — Medication 250 MG: at 17:31

## 2017-08-11 RX ADMIN — AMLODIPINE BESYLATE 10 MG: 10 TABLET ORAL at 09:01

## 2017-08-11 RX ADMIN — HEPARIN SODIUM 5000 UNITS: 5000 INJECTION, SOLUTION INTRAVENOUS; SUBCUTANEOUS at 21:43

## 2017-08-11 RX ADMIN — BUDESONIDE AND FORMOTEROL FUMARATE DIHYDRATE 2 PUFF: 80; 4.5 AEROSOL RESPIRATORY (INHALATION) at 07:11

## 2017-08-11 RX ADMIN — FUROSEMIDE 40 MG: 40 TABLET ORAL at 09:01

## 2017-08-11 RX ADMIN — ONDANSETRON 4 MG: 2 INJECTION INTRAMUSCULAR; INTRAVENOUS at 12:09

## 2017-08-11 RX ADMIN — SODIUM CHLORIDE 125 MG: 9 INJECTION, SOLUTION INTRAVENOUS at 09:01

## 2017-08-11 RX ADMIN — Medication 250 MG: at 09:01

## 2017-08-11 RX ADMIN — FUROSEMIDE 40 MG: 40 TABLET ORAL at 17:31

## 2017-08-11 RX ADMIN — BUDESONIDE AND FORMOTEROL FUMARATE DIHYDRATE 2 PUFF: 80; 4.5 AEROSOL RESPIRATORY (INHALATION) at 20:07

## 2017-08-11 RX ADMIN — Medication 2 TABLET: at 21:44

## 2017-08-11 RX ADMIN — MAGNESIUM HYDROXIDE 10 ML: 2400 SUSPENSION ORAL at 21:44

## 2017-08-11 RX ADMIN — HYDROCODONE BITARTRATE AND ACETAMINOPHEN 1 TABLET: 5; 325 TABLET ORAL at 12:09

## 2017-08-11 RX ADMIN — ONDANSETRON 4 MG: 2 INJECTION INTRAMUSCULAR; INTRAVENOUS at 03:53

## 2017-08-11 RX ADMIN — ISOSORBIDE MONONITRATE 60 MG: 60 TABLET, EXTENDED RELEASE ORAL at 09:01

## 2017-08-11 RX ADMIN — ATORVASTATIN CALCIUM 10 MG: 10 TABLET, FILM COATED ORAL at 09:01

## 2017-08-11 RX ADMIN — INSULIN LISPRO 2 UNITS: 100 INJECTION, SOLUTION INTRAVENOUS; SUBCUTANEOUS at 21:43

## 2017-08-11 RX ADMIN — HYDRALAZINE HYDROCHLORIDE 50 MG: 50 TABLET ORAL at 21:44

## 2017-08-11 RX ADMIN — CLONAZEPAM 0.25 MG: 0.5 TABLET ORAL at 21:44

## 2017-08-11 RX ADMIN — HYDRALAZINE HYDROCHLORIDE 50 MG: 50 TABLET ORAL at 14:47

## 2017-08-11 NOTE — PROGRESS NOTES
Continued Stay Note  [facility]     Patient Name: Joy Bueno  MRN: [id number]  Today's Date: 8/11/2017    Admit Date: 8/3/2017          Discharge Plan       08/11/17 1425    Case Management/Social Work Plan    Plan discharge plan    Patient/Family In Agreement With Plan yes    Additional Comments Pt is aware and agreeable to discharge plan.  CM will cont to follow.      08/11/17 1414    Case Management/Social Work Plan    Plan discharge plan    Patient/Family In Agreement With Plan yes    Additional Comments Spoke with APRN and pt not medically ready for discharge today, possibly tomorrow. Spoke with Heather from [facility] and they can accept pt tomorrow if medically ready for discharge. Ambulance for transportation changed to tomorrow, Sat, 8/12 at 2pm to transport pt from [facility] to [facility]. If medically ready for discharge over weekend, nurse will need to call report to [facility] at [phone]. Fax discharge summary to [phone]. Pt is agreeable to discharge plan. Ambulance form on chartlet.  Nurse will need to notify [facility]  at [phone] if discharge date changes. CM will cont to follow.              Discharge Codes     None        Expected Discharge Date and Time     Expected Discharge Date Expected Discharge Time    Aug 12, 2017             Lucy Winkler RN

## 2017-08-11 NOTE — PLAN OF CARE
Problem: Patient Care Overview (Adult)  Goal: Plan of Care Review  Outcome: Ongoing (interventions implemented as appropriate)    08/11/17 0311   Coping/Psychosocial Response Interventions   Plan Of Care Reviewed With patient   Patient Care Overview   Progress no change       Goal: Discharge Needs Assessment  Outcome: Ongoing (interventions implemented as appropriate)    Problem: Skin Integrity Impairment, Risk/Actual (Adult)  Goal: Skin Integrity/Wound Healing  Outcome: Ongoing (interventions implemented as appropriate)    08/11/17 0311   Skin Integrity Impairment, Risk/Actual (Adult)   Skin Integrity/Wound Healing making progress toward outcome

## 2017-08-11 NOTE — PROGRESS NOTES
[facility] Medicine Services  INPATIENT PROGRESS NOTE    Date of Admission: 8/3/2017  Length of Stay: 8  Primary Care Physician: Trevon Delarosa MD    Subjective   CC: FU anemia, renal failure  HPI:    Patient seen this morning around 0930 this morning.  Nursing staff at bedside.  States that she is a little drowsy today but feels like the klonopin has helped her anxiety.  Slept well overnight.  Renal biopsy went well.          Review Of Systems:   Review of Systems   Constitutional: Positive for fatigue.   HENT: Negative.    Eyes: Negative.    Respiratory: Negative.    Cardiovascular: Negative.    Gastrointestinal: Negative.    Endocrine: Negative.    Genitourinary: Negative.    Musculoskeletal: Positive for arthralgias.   Allergic/Immunologic: Negative.    Neurological: Positive for weakness.   Hematological: Negative.    Psychiatric/Behavioral: The patient is nervous/anxious (improving).          Objective      Temp:  [97.7 °F (36.5 °C)-98 °F (36.7 °C)] 97.7 °F (36.5 °C)  Heart Rate:  [77-90] 77  Resp:  [18-20] 18  BP: (140-146)/(64-69) 140/64  Physical Exam     General: Resting in bed, NAD, nursing staff at bedside.   HEENT: Normocephalic, mucous membranes moist, pupils equal  Neck: Supple, trachea midline  Cardiovascular: Regular rate and rhythm, S1-S2, no murmur  Respiratory: decreased in bases, even unlabored breathing on nasal cannula  Abdomen: Soft, nontender, nondistended, bowel sounds +, obese  Skin: Clean, dry, intact, no lesions or sores  Extremities: SUTTON, Symmetrical, pulses +, 2+ pitting edema (improving)  Neuro: Alert oriented ×4, appropriate and cooperative, anxiety improved    Results Review:    I have reviewed the labs, radiology results and diagnostic studies.      Results from last 7 days  Lab Units 08/10/17  0451   WBC 10*3/mm3 10.83*   HEMOGLOBIN g/dL 7.6*   PLATELETS 10*3/mm3 381       Results from last 7 days  Lab Units 08/11/17  0402   SODIUM mmol/L 138    POTASSIUM mmol/L 4.2   CHLORIDE mmol/L 104   CO2 mmol/L 29.0   BUN mg/dL 55*   CREATININE mg/dL 2.80*   GLUCOSE mg/dL 106*   CALCIUM mg/dL 8.8       Culture Data: Cultures:    Urine Culture   Date Value Ref Range Status   08/03/2017 >100,000 CFU/mL Gram Negative Bacilli (A)  Preliminary       Radiology Data:     I have reviewed the medications.    [START ON 8/12/2017] amLODIPine 5 mg Oral Daily   atorvastatin 10 mg Oral Daily   budesonide-formoterol 2 puff Inhalation BID - RT   escitalopram 10 mg Oral Daily   furosemide 40 mg Oral BID   heparin (porcine) 5,000 Units Subcutaneous Q12H   hydrALAZINE 50 mg Oral TID   hydrocortisone  Rectal BID   insulin lispro 0-7 Units Subcutaneous 4x Daily AC & at Bedtime   isosorbide mononitrate 60 mg Oral Daily   Pharmacy Meds to Bed Consult  Does not apply Daily   saccharomyces boulardii 250 mg Oral BID   sennosides-docusate sodium 2 tablet Oral Nightly         Assessment/Plan     Problem List  Hospital Problem List     * (Principal)Symptomatic anemia    Hypertension    Renal failure    Overview Signed 8/3/2017  4:58 PM by SUELLEN Richey     Unclear whether this is a chronic problem.           Asthma    Diabetes mellitus    Morbid obesity    Anxiety    Acute on chronic heart failure               Assessment/Plan:    NATALIO on CKD  - baseline unclear, but patient recently seen at [facility] with cr elevated to 4.6 (baseline reported 1.5)  - creatinine still bouncing from high 2 to low 3 range  - proteinuria  - avoid nephrotoxins  - renal biopsy yesterday- path pending    Anxiety-  -will start lexapro 10 mg daily  -switch from valium to klonopin BID PRN    Anemia  - chronicity unclear, will check PCP and  records for prior hemoglobin levels  - patient denies melena or brbpr, no prior endoscopy  - iv iron replacement while hospitalized  - unclear if secondary to CKD vs GI loss  - will need colonoscopy +/- EGD IP vs OP, discussed with GI   - patient says she would not want to do  colonoscopy currently, would like to feel stronger first    HTN-  -nephrology decreased amlodipine due to her pedal edema  -increase hydralazine to 75 mg TID if needed    DMII  - a1c 6.0  -continue correction, adequate control    Morbid obesity    Left leg pain and difficulty ambulating  - per OSH dc summary, duplex LE negative  - continue PT/OT  - OOBTC    UTI (POA), Ecoli- resolved  - s/p rocephin x 7 days  - probiotics  -Urine culture + for e-coli    Constipation  - stool softener      VTE proph: SQ heparin  Discharge Planning: I expect patient to be discharged back to [facility] once medically ready, hopefully tomorrow if okay with Dr. Welch.    Mary Fischer, APRN   08/11/17   3:38 PM

## 2017-08-11 NOTE — PROGRESS NOTES
"   LOS: 8 days    Patient Care Team:  Trevno Delarosa MD as PCP - General    Reason For Visit:  F/U NATALIO ON CKD.  Subjective     No acute issues. No new complaints. Does get panic attacks    Review of Systems:    Pulm: No soa   CV:  No CP      Objective       amLODIPine 10 mg Oral Daily   atorvastatin 10 mg Oral Daily   budesonide-formoterol 2 puff Inhalation BID - RT   escitalopram 10 mg Oral Daily   ferric gluconate (FERRLECIT) IVPB 125 mg Intravenous Daily   furosemide 40 mg Oral BID   heparin (porcine) 5,000 Units Subcutaneous Q12H   hydrALAZINE 50 mg Oral TID   hydrocortisone  Rectal BID   insulin lispro 0-7 Units Subcutaneous 4x Daily AC & at Bedtime   isosorbide mononitrate 60 mg Oral Daily   Pharmacy Meds to Bed Consult  Does not apply Daily   saccharomyces boulardii 250 mg Oral BID   sennosides-docusate sodium 2 tablet Oral Nightly            Vital Signs:  Blood pressure 146/67, pulse 86, temperature 97.7 °F (36.5 °C), temperature source Oral, resp. rate 18, height 64\" (162.6 cm), weight (!) 329 lb 11.2 oz (150 kg), SpO2 100 %.    Flowsheet Rows         First Filed Value    Admission Height  63\" (160 cm) Documented at 08/03/2017 1256    Admission Weight  (!)  385 lb (175 kg) Documented at 08/03/2017 1256               Physical Exam:    General Appearance: NAD, alert and cooperative, Ox3  Eyes: PER, conjunctivae and sclerae normal, no icterus  Lungs: respirations regular and unlabored, no crepitus, clear to auscultation  Heart/CV: regular rhythm & normal rate, no murmur, no gallop, no rub and 1+ edema  Abdomen: not distended, soft, non-tender, no masses,  bowel sounds present. OBESE.  Skin: No rash, Warm and dry    Radiology: RENAL U/S UNREMARKABLE.            Labs: CPK 42. AUSTIN AND ANCA PENDING.    Results from last 7 days  Lab Units 08/11/17  0402 08/10/17  1557 08/10/17  0451 08/09/17  0347 08/08/17  0354   WBC 10*3/mm3  --   --  10.83* 7.40 8.08   HEMOGLOBIN g/dL  --   --  7.6* 7.4* 7.5*   HEMATOCRIT % " 24.2* 24.3* 24.6* 24.7* 24.3*   PLATELETS 10*3/mm3  --   --  381 361 402       Results from last 7 days  Lab Units 08/11/17  0402 08/10/17  0455 08/09/17  0347 08/08/17  0902 08/07/17  0332   SODIUM mmol/L 138 141 140 140 141   POTASSIUM mmol/L 4.2 4.2 4.2 4.2 4.1   CHLORIDE mmol/L 104 105 105 104 102   CO2 mmol/L 29.0 28.0 29.0 30.0 28.0   BUN mg/dL 55* 58* 59* 37* 57*   CREATININE mg/dL 2.80* 3.10* 3.40* 2.70* 3.60*   CALCIUM mg/dL 8.8 8.9 8.6* 8.4* 8.6*   PHOSPHORUS mg/dL  --  5.0 5.6* 4.6 5.4*   ALBUMIN g/dL  --  3.30 3.00* 3.10* 3.10*       Results from last 7 days  Lab Units 08/11/17  0402   GLUCOSE mg/dL 106*                       Estimated Creatinine Clearance: 29 mL/min (by C-G formula based on Cr of 2.8).    FINDINGS: The right kidney measures 12.9 cm in length. There are simple  cysts, the largest which measures 2.1 cm. The left kidney measures 12.7  cm in length. There is a small simple cyst related to the left kidney  measuring 1.7 cm. There is a second smaller 1.4 cm cyst.      IMPRESSION:  The kidneys are of normal size. There are simple cyst  but  no solid renal mass, stone or obstruction.        Assessment     Principal Problem:    Symptomatic anemia  Active Problems:    Asthma    Diabetes mellitus    Hypertension    Morbid obesity    Renal failure    Anxiety    Acute on chronic heart failure      Impression:   NATALIO - non oliguric - Scr improved from 3.1 to 2.8 today.   PROTEINURIA IMPROVED FROM WHAT IT WAS AT [facility] LAST MONTH. P/C ration 4.6. AUSTIN negative. ANCA -negative.    ANEMIA- stable IV FE- completed.     Recommendations:   Monitor I/O   Avoid nephrotoxic agents  Continue with diuresis.   Monitor H/H  Biopsy done yesterday. Will follow up with result.    Will decrease dose of amlodipine - pedal edema. If needed will increase hydralazine to 75 mg po q tid.         Jackson Welch MD  08/11/17  8:51 AM

## 2017-08-11 NOTE — PROGRESS NOTES
Continued Stay Note  [facility]     Patient Name: Joy Bueno  MRN: [id number]  Today's Date: 8/11/2017    Admit Date: 8/3/2017          Discharge Plan       08/11/17 1414    Case Management/Social Work Plan    Plan discharge plan    Patient/Family In Agreement With Plan yes    Additional Comments Spoke with APRN and pt not medically ready for discharge today, possibly tomorrow,8/1 Spoke with Heather from [facility] and they can accept pt tomorrow or Sunday if medically ready for discharge. Ambulance for transportation changed to tomorrow, Sat, 8/12 at 2pm to transport pt from [facility] to [facility]. If medically ready for discharge over weekend, nurse will need to call [facility] [phone]. Fax discharge summary to [phone]. Pt is agreeable to discharge plan. Ambulance form on chartlet.  Nurse will need to notify [facility]  at [phone] if discharge date changes. CM will cont to follow.              Discharge Codes     None        Expected Discharge Date and Time     Expected Discharge Date Expected Discharge Time    Aug 12, 2017             Lucy Winkler RN

## 2017-08-12 VITALS
SYSTOLIC BLOOD PRESSURE: 133 MMHG | TEMPERATURE: 97.8 F | RESPIRATION RATE: 18 BRPM | BODY MASS INDEX: 50.02 KG/M2 | OXYGEN SATURATION: 100 % | HEIGHT: 64 IN | HEART RATE: 81 BPM | WEIGHT: 293 LBS | DIASTOLIC BLOOD PRESSURE: 68 MMHG

## 2017-08-12 PROBLEM — I50.9 ACUTE ON CHRONIC HEART FAILURE (HCC): Status: RESOLVED | Noted: 2017-08-03 | Resolved: 2017-08-12

## 2017-08-12 PROBLEM — D64.9 SYMPTOMATIC ANEMIA: Status: RESOLVED | Noted: 2017-08-03 | Resolved: 2017-08-12

## 2017-08-12 LAB
25(OH)D3 SERPL-MCNC: 14.1 NG/ML
ALBUMIN SERPL-MCNC: 3 G/DL (ref 3.2–4.8)
ANION GAP SERPL CALCULATED.3IONS-SCNC: 8 MMOL/L (ref 3–11)
BUN BLD-MCNC: 66 MG/DL (ref 9–23)
BUN/CREAT SERPL: 22 (ref 7–25)
CALCIUM SPEC-SCNC: 8.4 MG/DL (ref 8.7–10.4)
CHLORIDE SERPL-SCNC: 103 MMOL/L (ref 99–109)
CO2 SERPL-SCNC: 28 MMOL/L (ref 20–31)
CREAT BLD-MCNC: 3 MG/DL (ref 0.6–1.3)
GFR SERPL CREATININE-BSD FRML MDRD: 19 ML/MIN/1.73
GLUCOSE BLD-MCNC: 181 MG/DL (ref 70–100)
GLUCOSE BLDC GLUCOMTR-MCNC: 141 MG/DL (ref 70–130)
GLUCOSE BLDC GLUCOMTR-MCNC: 162 MG/DL (ref 70–130)
GLUCOSE BLDC GLUCOMTR-MCNC: 172 MG/DL (ref 70–130)
PHOSPHATE SERPL-MCNC: 5.8 MG/DL (ref 2.4–5.1)
POTASSIUM BLD-SCNC: 4.9 MMOL/L (ref 3.5–5.5)
SODIUM BLD-SCNC: 139 MMOL/L (ref 132–146)

## 2017-08-12 PROCEDURE — 99239 HOSP IP/OBS DSCHRG MGMT >30: CPT | Performed by: NURSE PRACTITIONER

## 2017-08-12 PROCEDURE — 80069 RENAL FUNCTION PANEL: CPT | Performed by: INTERNAL MEDICINE

## 2017-08-12 PROCEDURE — 25010000002 HEPARIN (PORCINE) PER 1000 UNITS: Performed by: INTERNAL MEDICINE

## 2017-08-12 PROCEDURE — 94799 UNLISTED PULMONARY SVC/PX: CPT

## 2017-08-12 PROCEDURE — 94640 AIRWAY INHALATION TREATMENT: CPT

## 2017-08-12 PROCEDURE — 83970 ASSAY OF PARATHORMONE: CPT | Performed by: INTERNAL MEDICINE

## 2017-08-12 PROCEDURE — 82306 VITAMIN D 25 HYDROXY: CPT | Performed by: INTERNAL MEDICINE

## 2017-08-12 PROCEDURE — 82962 GLUCOSE BLOOD TEST: CPT

## 2017-08-12 RX ORDER — ISOSORBIDE MONONITRATE 60 MG/1
60 TABLET, EXTENDED RELEASE ORAL DAILY
Start: 2017-08-12 | End: 2018-08-06 | Stop reason: HOSPADM

## 2017-08-12 RX ORDER — SACCHAROMYCES BOULARDII 250 MG
250 CAPSULE ORAL 2 TIMES DAILY
Start: 2017-08-12 | End: 2019-09-01

## 2017-08-12 RX ORDER — HYDRALAZINE HYDROCHLORIDE 50 MG/1
50 TABLET, FILM COATED ORAL 3 TIMES DAILY
Status: ON HOLD
Start: 2017-08-12 | End: 2017-08-30

## 2017-08-12 RX ORDER — HYDROCODONE BITARTRATE AND ACETAMINOPHEN 5; 325 MG/1; MG/1
1 TABLET ORAL EVERY 4 HOURS PRN
Qty: 12 TABLET | Refills: 0 | Status: SHIPPED | OUTPATIENT
Start: 2017-08-12 | End: 2017-08-15

## 2017-08-12 RX ORDER — ESCITALOPRAM OXALATE 10 MG/1
10 TABLET ORAL DAILY
Start: 2017-08-12 | End: 2018-08-06 | Stop reason: HOSPADM

## 2017-08-12 RX ORDER — AMLODIPINE BESYLATE 5 MG/1
5 TABLET ORAL DAILY
Start: 2017-08-12 | End: 2017-11-10 | Stop reason: HOSPADM

## 2017-08-12 RX ORDER — CLONAZEPAM 0.5 MG/1
0.25 TABLET ORAL 2 TIMES DAILY PRN
Qty: 6 TABLET | Refills: 0 | Status: SHIPPED | OUTPATIENT
Start: 2017-08-12 | End: 2017-08-15

## 2017-08-12 RX ADMIN — ISOSORBIDE MONONITRATE 60 MG: 60 TABLET, EXTENDED RELEASE ORAL at 08:34

## 2017-08-12 RX ADMIN — BUDESONIDE AND FORMOTEROL FUMARATE DIHYDRATE 2 PUFF: 80; 4.5 AEROSOL RESPIRATORY (INHALATION) at 09:03

## 2017-08-12 RX ADMIN — CLONAZEPAM 0.25 MG: 0.5 TABLET ORAL at 08:38

## 2017-08-12 RX ADMIN — BISACODYL 5 MG: 5 TABLET, COATED ORAL at 11:35

## 2017-08-12 RX ADMIN — HYDRALAZINE HYDROCHLORIDE 50 MG: 50 TABLET ORAL at 16:03

## 2017-08-12 RX ADMIN — INSULIN LISPRO 2 UNITS: 100 INJECTION, SOLUTION INTRAVENOUS; SUBCUTANEOUS at 16:13

## 2017-08-12 RX ADMIN — INSULIN LISPRO 2 UNITS: 100 INJECTION, SOLUTION INTRAVENOUS; SUBCUTANEOUS at 11:50

## 2017-08-12 RX ADMIN — HYDROCORTISONE 2.5%: 25 CREAM TOPICAL at 10:52

## 2017-08-12 RX ADMIN — HEPARIN SODIUM 5000 UNITS: 5000 INJECTION, SOLUTION INTRAVENOUS; SUBCUTANEOUS at 08:34

## 2017-08-12 RX ADMIN — MAGNESIUM HYDROXIDE 10 ML: 2400 SUSPENSION ORAL at 11:35

## 2017-08-12 RX ADMIN — Medication 250 MG: at 08:33

## 2017-08-12 RX ADMIN — FUROSEMIDE 40 MG: 40 TABLET ORAL at 08:34

## 2017-08-12 RX ADMIN — ESCITALOPRAM OXALATE 10 MG: 20 TABLET ORAL at 08:34

## 2017-08-12 RX ADMIN — AMLODIPINE BESYLATE 5 MG: 5 TABLET ORAL at 08:34

## 2017-08-12 RX ADMIN — HYDROCODONE BITARTRATE AND ACETAMINOPHEN 1 TABLET: 5; 325 TABLET ORAL at 06:21

## 2017-08-12 RX ADMIN — ATORVASTATIN CALCIUM 10 MG: 10 TABLET, FILM COATED ORAL at 08:34

## 2017-08-12 RX ADMIN — HYDRALAZINE HYDROCHLORIDE 50 MG: 50 TABLET ORAL at 06:21

## 2017-08-12 NOTE — PLAN OF CARE
Problem: Patient Care Overview (Adult)  Goal: Plan of Care Review  Outcome: Ongoing (interventions implemented as appropriate)    08/12/17 0514   Coping/Psychosocial Response Interventions   Plan Of Care Reviewed With patient   Patient Care Overview   Progress no change       Goal: Discharge Needs Assessment  Outcome: Ongoing (interventions implemented as appropriate)    08/12/17 0514   Discharge Needs Assessment   Discharge Facility/Level Of Care Needs nursing facility, skilled         Problem: Skin Integrity Impairment, Risk/Actual (Adult)  Goal: Skin Integrity/Wound Healing  Outcome: Ongoing (interventions implemented as appropriate)    08/12/17 0514   Skin Integrity Impairment, Risk/Actual (Adult)   Skin Integrity/Wound Healing making progress toward outcome

## 2017-08-12 NOTE — PROGRESS NOTES
"   LOS: 9 days    Patient Care Team:  Trevon Delarosa MD as PCP - General    Reason For Visit:  F/U NATALIO ON CKD.  Subjective     No acute issues. No new complaints. Does get panic attacks    Review of Systems:    Pulm: No soa   CV:  No CP      Objective       amLODIPine 5 mg Oral Daily   atorvastatin 10 mg Oral Daily   budesonide-formoterol 2 puff Inhalation BID - RT   escitalopram 10 mg Oral Daily   furosemide 40 mg Oral BID   heparin (porcine) 5,000 Units Subcutaneous Q12H   hydrALAZINE 50 mg Oral TID   hydrocortisone  Rectal BID   insulin lispro 0-7 Units Subcutaneous 4x Daily AC & at Bedtime   isosorbide mononitrate 60 mg Oral Daily   Pharmacy Meds to Bed Consult  Does not apply Daily   saccharomyces boulardii 250 mg Oral BID   sennosides-docusate sodium 2 tablet Oral Nightly            Vital Signs:  Blood pressure 132/68, pulse 74, temperature 97.8 °F (36.6 °C), temperature source Oral, resp. rate 18, height 64\" (162.6 cm), weight (!) 330 lb 4.8 oz (150 kg), SpO2 100 %.    Flowsheet Rows         First Filed Value    Admission Height  63\" (160 cm) Documented at 08/03/2017 1256    Admission Weight  (!)  385 lb (175 kg) Documented at 08/03/2017 1256          08/11 0701 - 08/12 0700  In: 600 [P.O.:600]  Out: -     Physical Exam:    General Appearance: NAD, alert and cooperative, Ox3  Eyes: PER, conjunctivae and sclerae normal, no icterus  Lungs: respirations regular and unlabored, no crepitus, clear to auscultation  Heart/CV: regular rhythm & normal rate, no murmur, no gallop, no rub and 1+ edema  Abdomen: not distended, soft, non-tender, no masses,  bowel sounds present. OBESE.  Skin: No rash, Warm and dry    Radiology: RENAL U/S UNREMARKABLE.            Labs: CPK 42. AUSTIN AND ANCA PENDING.    Results from last 7 days  Lab Units 08/11/17  0402 08/10/17  1557 08/10/17  0451 08/09/17  0347 08/08/17  0354   WBC 10*3/mm3  --   --  10.83* 7.40 8.08   HEMOGLOBIN g/dL  --   --  7.6* 7.4* 7.5*   HEMATOCRIT % 24.2* 24.3* " 24.6* 24.7* 24.3*   PLATELETS 10*3/mm3  --   --  381 361 402       Results from last 7 days  Lab Units 08/11/17  0402 08/10/17  0455 08/09/17  0347 08/08/17  0902 08/07/17  0332   SODIUM mmol/L 138 141 140 140 141   POTASSIUM mmol/L 4.2 4.2 4.2 4.2 4.1   CHLORIDE mmol/L 104 105 105 104 102   CO2 mmol/L 29.0 28.0 29.0 30.0 28.0   BUN mg/dL 55* 58* 59* 37* 57*   CREATININE mg/dL 2.80* 3.10* 3.40* 2.70* 3.60*   CALCIUM mg/dL 8.8 8.9 8.6* 8.4* 8.6*   PHOSPHORUS mg/dL  --  5.0 5.6* 4.6 5.4*   ALBUMIN g/dL  --  3.30 3.00* 3.10* 3.10*       Results from last 7 days  Lab Units 08/11/17  0402   GLUCOSE mg/dL 106*                       Estimated Creatinine Clearance: 29 mL/min (by C-G formula based on Cr of 2.8).    FINDINGS: The right kidney measures 12.9 cm in length. There are simple  cysts, the largest which measures 2.1 cm. The left kidney measures 12.7  cm in length. There is a small simple cyst related to the left kidney  measuring 1.7 cm. There is a second smaller 1.4 cm cyst.      IMPRESSION:  The kidneys are of normal size. There are simple cyst  but  no solid renal mass, stone or obstruction.        Assessment     Principal Problem:    Symptomatic anemia  Active Problems:    Asthma    Diabetes mellitus    Hypertension    Morbid obesity    Renal failure    Anxiety    Acute on chronic heart failure      Impression:   NATALIO - non oliguric - Scr improved from 3.1 to 2.8 today.   CKD secondary to Diabetic nephropathy. Stage 4.   PROTEINURIA  P/C ration 4.6. AUSTIN negative. ANCA -negative. Biopsy - Moderate to Sever Diabetic nephropathy with chronic interstitial fibrosis.   ANEMIA- stable IV FE- completed.     Recommendations:   Monitor I/O   Avoid nephrotoxic agents  Continue with diuresis.   Monitor H/H  Biopsy done yesterday. Will follow up with result.    Will decrease dose of amlodipine - pedal edema. If needed will increase hydralazine to 75 mg po q tid.     Check labs - If Scr is below 3.2 - can be discharged. Follow  up with [facility] in 2 -3 weeks with repeat renal function panel.         Jackson Welch MD  08/12/17  10:11 AM

## 2017-08-12 NOTE — DISCHARGE PLACEMENT REQUEST
"MALGORZATA ACEVEDO, RN    [phone]          Pam Loyd (67 y.o. Female)     Date of Birth Social Security Number Address Home Phone MRN    1950  [address] [id number] [phone] [id number]    Latter day Marital Status          None        Admission Date Admission Type Admitting Provider Attending Provider Department, Room/Bed    8/3/17 Emergency Yonis Lees MD [place], Yonis Hunter MD [facility] 5H, S572/1    Discharge Date Discharge Disposition Discharge Destination         Rehab Facility or Unit (DC - External)             Attending Provider: Yonis Lees MD     Allergies:  No Known Allergies    Isolation:  None   Infection:  None   Code Status:  Conditional    Ht:  64\" (162.6 cm)   Wt:  330 lb 4.8 oz (150 kg)    Admission Cmt:  None   Principal Problem:  Symptomatic anemia [D64.9]                 Active Insurance as of 8/3/2017     Primary Coverage     Payor Plan Insurance Group Employer/Plan Group    MEDICARE MEDICARE A & B      Payor Plan Address Payor Plan Phone Number Effective From Effective To    [address] [phone] 5/1/2016     [place] [address]       Subscriber Name Subscriber Birth Date Member ID       PAM LOYD 1950 [id number]           Secondary Coverage     Payor Plan Insurance Group Employer/Plan Group    [place]      Payor Plan Address Payor Plan Phone Number Effective From Effective To    [address] [address]  5/1/2016     [facility] [place] [address]       Subscriber Name Subscriber Birth Date Member ID       PAM LOYD 1950 [id number]                 Emergency Contacts      (Rel.) Home Phone Work Phone Mobile Phone    Tessie Dorado (Daughter) [phone] -- --               Discharge Summary      SUELLEN Farmer at 8/12/2017 11:45 AM              [facility]  DISCHARGE SUMMARY       Date of " Admission: 8/3/2017  Date of Discharge:  8/12/2017  Primary Care Physician: Trevon Delarosa MD  Consulting Physician(s)     Provider Relationship    Rocky Nova MD Consulting Physician        Discharge Diagnoses:  Active Hospital Problems (** Indicates Principal Problem)    Diagnosis Date Noted   • Asthma [J45.909] 08/03/2017   • Diabetes mellitus [E11.9] 08/03/2017   • Hypertension [I10] 08/03/2017   • Renal failure [N19] 08/03/2017     Unclear whether this is a chronic problem.       • Morbid obesity [E66.01]    • Anxiety [F41.9]       Resolved Hospital Problems    Diagnosis Date Noted Date Resolved   • **Symptomatic anemia [D64.9] 08/03/2017 08/12/2017   • Acute on chronic heart failure [I50.9] 08/03/2017 08/12/2017     Presenting Problem/History of Present Illness  Renal failure [N19]     Chief Complaint on Day of Discharge: anemia, renal failure    History of Present Illness on Day of Discharge:   Patient lying in bed without any new complaints or issues.  States a little drowsy this morning but feels like the klonopin has helped her anxiety.  Ready to go to rehab today.  Denies chest pain, shortness of breath or abdominal pain.  Reports no BM since Wednesday, does not want to take a suppository due to her hemorrhoids.      Hospital Course  Patient is a 67 y.o.morbidly obese female with past medical history of hypertension, diabetes, osteoarthritis, asthma and anxiety.  She presented to [facility] ED from her rehab facility with complaints of abnormal labs.  The patient had a recent admission at [facility] and treated for an asthma exacerbation.  At that time, she was told that her kidney function labs were elevated.  She reports no prior history of kidney disease.  She was just discharged from [facility] to the rehabilitation facility where she has been for approximately 2 weeks.  Patient developed diarrhea at rehab.  In the ED, patient was noted to have a creatinine of 3.2  and H/H of 7.3/24.3.  She was admitted to the hospital medicine service for further evaluation and management for the following problems:    NATALIO on CKD  - baseline unclear, but patient recently seen at [facility] with cr elevated to 4.6 (baseline reported 1.5)  - creatinine still bouncing from high 2 to low 3 range (3.0 on day of discharge)  - proteinuria  - avoid nephrotoxins  - renal biopsy 8/10/17- path pending  - follow up with [facility] in 2 weeks with renal function panel     Anxiety  - started lexapro 10 mg daily  - switch from valium to klonopin BID PRN     Anemia  - chronicity unclear  - patient denies melena or brbpr, no prior endoscopy  - iv iron replacement while hospitalized  - unclear if secondary to CKD vs GI loss  - will need colonoscopy +/- EGD IP vs OP, discussed with GI   - patient says she would not want to do colonoscopy currently, would like to feel stronger first, defer to PCP to follow up     HTN  - nephrology decreased amlodipine due to her pedal edema  - increase hydralazine to 75 mg TID if needed     DMII  - a1c 6.0  - continue correction, adequate control on ss only, re-evaluate upon discharge from rehab     Left leg pain and difficulty ambulating  - per OSH dc summary, duplex LE negative  - continue PT/OT  - OOBTC  - norco PRN     UTI (POA), Ecoli- resolved  - s/p rocephin x 7 days  - probiotics    Constipation  - stool softener    Patient is medically stable and ready for transfer to [facility] for further rehabilitation.  Patient seen and cleared for discharge by nephrology today.  She is to follow up with [facility] in 2 weeks for kidney biopsy results.  Discharge plans and instructions were reviewed with patient and she verbalized an understanding.      Procedures Performed:  Kidney biopsy    08/10 2848 Note By: Irineo Latham MD    Consults:   Consults     Date and Time Order Name Status Description    8/3/2017 1918 Inpatient Consult to Nephrology          Pertinent Test  "Results:    Results from last 7 days  Lab Units 08/11/17  0402 08/10/17  1557 08/10/17  0451 08/09/17  0347 08/08/17  0354   WBC 10*3/mm3  --   --  10.83* 7.40 8.08   HEMOGLOBIN g/dL  --   --  7.6* 7.4* 7.5*   HEMATOCRIT % 24.2* 24.3* 24.6* 24.7* 24.3*   PLATELETS 10*3/mm3  --   --  381 361 402       Results from last 7 days  Lab Units 08/12/17  1026 08/11/17  0402 08/10/17  0455   SODIUM mmol/L 139 138 141   POTASSIUM mmol/L 4.9 4.2 4.2   CHLORIDE mmol/L 103 104 105   CO2 mmol/L 28.0 29.0 28.0   BUN mg/dL 66* 55* 58*   CREATININE mg/dL 3.00* 2.80* 3.10*   GLUCOSE mg/dL 181* 106* 140*   CALCIUM mg/dL 8.4* 8.8 8.9     Imaging Results (last 72 hours)     Procedure Component Value Units Date/Time    CT Needle Biopsy Kidney [[id number]] Collected:  08/10/17 1653     Updated:  08/11/17 1613    Narrative:       EXAMINATION: CT-GUIDED NEEDLE BIOPSY KIDNEY-08/10/2017:      INDICATION: Nephrotic range proteinuria and NATALIO; N19-Unspecified kidney  failure; D64.9-Anemia, unspecified; N30.00-Acute cystitis without  hematuria; E66.01-Morbid (severe) obesity due to excess calories;  R62.7-Adult failure to thrive; Z74.09-Other reduced mobility.         TECHNIQUE: Limited helical CT of the abdomen for the purposes of  CT-guided biopsy performed by Dr. Paz.     The radiation dose reduction device was turned on for each scan per the  ALARA (As Low as Reasonably Achievable) protocol.     COMPARISON: Ultrasound renal dated 08/04/2017.     FINDINGS: CT-guided renal biopsy of the left kidney from left posterior  approach performed by Dr. Paz. The dictation for the purposes of  documentation of CT fluoroscopy usage.       Impression:       CT-guided renal biopsy.     D:  08/10/2017  E:  08/10/2017           This report was finalized on 8/11/2017 4:11 PM by Dr. Ashu Romero.           Condition on Discharge:  stable    Physical Exam on Discharge:/68  Pulse 74  Temp 97.8 °F (36.6 °C) (Oral)   Resp 18  Ht 64\" (162.6 cm)  " Wt (!) 330 lb 4.8 oz (150 kg)  SpO2 100%  BMI 56.7 kg/m2  Physical Exam  General: Resting in bed, NAD  HEENT: Normocephalic, mucous membranes moist, pupils equal  Neck: Supple, trachea midline  Cardiovascular: Regular rate and rhythm, S1-S2, no murmur  Respiratory: decreased in bases, even unlabored breathing on nasal cannula at 2 liters  Abdomen: Soft, nontender, nondistended, + bowel sounds, obese  Skin: Clean, dry, intact, no lesions or sores  Extremities: SUTTON, Symmetrical, pulses +, 2+ pitting edema (improving)  Neuro: Alert oriented ×4, appropriate and cooperative, anxiety improved    Discharge Disposition  Rehab Facility or Unit (DC - External)    Discharge Medications   Joy Bueno   Home Medication Instructions JESSICA:[id number]    Printed on:08/12/17 4429   Medication Information                      acetaminophen (TYLENOL) 325 MG tablet  Take 650 mg by mouth Every 4 (Four) Hours As Needed for Mild Pain (1-3).             albuterol (PROVENTIL HFA;VENTOLIN HFA) 108 (90 BASE) MCG/ACT inhaler  Inhale 2 puffs Every 4 (Four) Hours As Needed for Wheezing.             aluminum-magnesium hydroxide-simethicone (MAALOX/MYLANTA) 200-200-20 MG/5ML suspension  Take 30 mL by mouth As Needed for Indigestion or Heartburn (For upset stomach).             amLODIPine (NORVASC) 5 MG tablet  Take 1 tablet by mouth Daily.             atorvastatin (LIPITOR) 10 MG tablet  Take 10 mg by mouth Every Night.             clonazePAM (KlonoPIN) 0.5 MG tablet  Take 0.5 tablets by mouth 2 (Two) Times a Day As Needed for Anxiety for up to 3 days.             docusate sodium (COLACE) 100 MG capsule  Take 100 mg by mouth Daily. Hold for loose stool             escitalopram (LEXAPRO) 10 MG tablet  Take 1 tablet by mouth Daily.             furosemide (LASIX) 40 MG tablet  Take 40 mg by mouth 2 (Two) Times a Day.             hydrALAZINE (APRESOLINE) 50 MG tablet  Take 1 tablet by mouth 3 (Three) Times a Day.              HYDROcodone-acetaminophen (NORCO) 5-325 MG per tablet  Take 1 tablet by mouth Every 4 (Four) Hours As Needed for Moderate Pain (4-6) for up to 3 days.             insulin lispro (humaLOG) 100 UNIT/ML injection  Inject 0-7 Units under the skin 4 (Four) Times a Day Before Meals & at Bedtime.             isosorbide mononitrate (IMDUR) 60 MG 24 hr tablet  Take 1 tablet by mouth Daily.             magnesium hydroxide (MILK OF MAGNESIA) 400 MG/5ML suspension  Take 10 mL by mouth Daily As Needed for Constipation.             mometasone-formoterol (DULERA 200) 200-5 MCG/ACT inhaler  Inhale 2 puffs 2 (Two) Times a Day.             Multiple Vitamins-Minerals (MULTIVITAMIN ADULTS PO)  Take 1 tablet by mouth Daily.             polyethylene glycol (MIRALAX) packet  Take 17 g by mouth Daily As Needed.             saccharomyces boulardii (FLORASTOR) 250 MG capsule  Take 1 capsule by mouth 2 (Two) Times a Day.             simethicone (MYLICON) 80 MG chewable tablet  Chew 80 mg Every 6 (Six) Hours As Needed for Flatulence.               Discharge Diet:   Diet Instructions     Diet: Regular, Consistent Carbohydrate, Cardiac, Renal       Discharge Diet:   Regular  Consistent Carbohydrate  Cardiac  Renal                  Discharge Care Plan / Instructions:    Activity at Discharge:   Activity Instructions     Activity as Tolerated       Up with assistance  Fall precautions               Follow-up Appointments  No future appointments.  Additional Instructions for the Follow-ups that You Need to Schedule     Discharge Follow-up with PCP    As directed    Follow Up Details:  within 1 week of discharge from rehab       Discharge Follow-up with Specialty    As directed    Specialty:  NAL   Follow Up:  2 Weeks               Test Results Pending at Discharge   Order Current Status    PTH, Intact In process    Tissue Pathology Exam In process           SUELLEN Farmer 08/12/17 12:32 PM    Time: Discharge 45 min    Please note that  portions of this note may have been completed with a voice recognition program. Efforts were made to edit the dictations, but occasionally words are mistranscribed.       Electronically signed by SUELLEN Farmer at 8/12/2017 12:37 PM

## 2017-08-12 NOTE — DISCHARGE SUMMARY
[facility] Medicine Services  DISCHARGE SUMMARY       Date of Admission: 8/3/2017  Date of Discharge:  8/12/2017  Primary Care Physician: Trevon Delarosa MD  Consulting Physician(s)     Provider Relationship    Rocky Nova MD Consulting Physician        Discharge Diagnoses:  Active Hospital Problems (** Indicates Principal Problem)    Diagnosis Date Noted   • Asthma [J45.909] 08/03/2017   • Diabetes mellitus [E11.9] 08/03/2017   • Hypertension [I10] 08/03/2017   • Renal failure [N19] 08/03/2017     Unclear whether this is a chronic problem.       • Morbid obesity [E66.01]    • Anxiety [F41.9]       Resolved Hospital Problems    Diagnosis Date Noted Date Resolved   • **Symptomatic anemia [D64.9] 08/03/2017 08/12/2017   • Acute on chronic heart failure [I50.9] 08/03/2017 08/12/2017     Presenting Problem/History of Present Illness  Renal failure [N19]     Chief Complaint on Day of Discharge: anemia, renal failure    History of Present Illness on Day of Discharge:   Patient lying in bed without any new complaints or issues.  States a little drowsy this morning but feels like the klonopin has helped her anxiety.  Ready to go to rehab today.  Denies chest pain, shortness of breath or abdominal pain.  Reports no BM since Wednesday, does not want to take a suppository due to her hemorrhoids.      Hospital Course  Patient is a 67 y.o.morbidly obese female with past medical history of hypertension, diabetes, osteoarthritis, asthma and anxiety.  She presented to [facility] ED from her rehab facility with complaints of abnormal labs.  The patient had a recent admission at [facility] and treated for an asthma exacerbation.  At that time, she was told that her kidney function labs were elevated.  She reports no prior history of kidney disease.  She was just discharged from [facility] to the rehabilitation facility where she has been for approximately 2 weeks.  Patient  developed diarrhea at rehab.  In the ED, patient was noted to have a creatinine of 3.2 and H/H of 7.3/24.3.  She was admitted to the hospital medicine service for further evaluation and management for the following problems:    NATALIO on CKD  - baseline unclear, but patient recently seen at [facility] with cr elevated to 4.6 (baseline reported 1.5)  - creatinine still bouncing from high 2 to low 3 range (3.0 on day of discharge)  - proteinuria  - avoid nephrotoxins  - renal biopsy 8/10/17- path pending  - follow up with [facility] in 2 weeks with renal function panel     Anxiety  - started lexapro 10 mg daily  - switch from valium to klonopin BID PRN     Anemia  - chronicity unclear  - patient denies melena or brbpr, no prior endoscopy  - iv iron replacement while hospitalized  - unclear if secondary to CKD vs GI loss  - will need colonoscopy +/- EGD IP vs OP, discussed with GI   - patient says she would not want to do colonoscopy currently, would like to feel stronger first, defer to PCP to follow up     HTN  - nephrology decreased amlodipine due to her pedal edema  - increase hydralazine to 75 mg TID if needed     DMII  - a1c 6.0  - continue correction, adequate control on ss only, re-evaluate upon discharge from rehab     Left leg pain and difficulty ambulating  - per OSH dc summary, duplex LE negative  - continue PT/OT  - OOBTC  - norco PRN     UTI (POA), Ecoli- resolved  - s/p rocephin x 7 days  - probiotics    Constipation  - stool softener    Patient is medically stable and ready for transfer to [facility] for further rehabilitation.  Patient seen and cleared for discharge by nephrology today.  She is to follow up with [facility] in 2 weeks for kidney biopsy results.  Discharge plans and instructions were reviewed with patient and she verbalized an understanding.      Procedures Performed:  Kidney biopsy    08/10 1448 Note By: Irineo Latham MD    Consults:   Consults     Date and Time Order Name Status  Description    8/3/2017 1918 Inpatient Consult to Nephrology          Pertinent Test Results:    Results from last 7 days  Lab Units 08/11/17  0402 08/10/17  1557 08/10/17  0451 08/09/17  0347 08/08/17  0354   WBC 10*3/mm3  --   --  10.83* 7.40 8.08   HEMOGLOBIN g/dL  --   --  7.6* 7.4* 7.5*   HEMATOCRIT % 24.2* 24.3* 24.6* 24.7* 24.3*   PLATELETS 10*3/mm3  --   --  381 361 402       Results from last 7 days  Lab Units 08/12/17  1026 08/11/17  0402 08/10/17  0455   SODIUM mmol/L 139 138 141   POTASSIUM mmol/L 4.9 4.2 4.2   CHLORIDE mmol/L 103 104 105   CO2 mmol/L 28.0 29.0 28.0   BUN mg/dL 66* 55* 58*   CREATININE mg/dL 3.00* 2.80* 3.10*   GLUCOSE mg/dL 181* 106* 140*   CALCIUM mg/dL 8.4* 8.8 8.9     Imaging Results (last 72 hours)     Procedure Component Value Units Date/Time    CT Needle Biopsy Kidney [[id number]] Collected:  08/10/17 1653     Updated:  08/11/17 1613    Narrative:       EXAMINATION: CT-GUIDED NEEDLE BIOPSY KIDNEY-08/10/2017:      INDICATION: Nephrotic range proteinuria and NATALIO; N19-Unspecified kidney  failure; D64.9-Anemia, unspecified; N30.00-Acute cystitis without  hematuria; E66.01-Morbid (severe) obesity due to excess calories;  R62.7-Adult failure to thrive; Z74.09-Other reduced mobility.         TECHNIQUE: Limited helical CT of the abdomen for the purposes of  CT-guided biopsy performed by Dr. Paz.     The radiation dose reduction device was turned on for each scan per the  ALARA (As Low as Reasonably Achievable) protocol.     COMPARISON: Ultrasound renal dated 08/04/2017.     FINDINGS: CT-guided renal biopsy of the left kidney from left posterior  approach performed by Dr. Paz. The dictation for the purposes of  documentation of CT fluoroscopy usage.       Impression:       CT-guided renal biopsy.     D:  08/10/2017  E:  08/10/2017           This report was finalized on 8/11/2017 4:11 PM by Dr. Ashu Romero.           Condition on Discharge:  stable    Physical Exam on Discharge:BP  "132/68  Pulse 74  Temp 97.8 °F (36.6 °C) (Oral)   Resp 18  Ht 64\" (162.6 cm)  Wt (!) 330 lb 4.8 oz (150 kg)  SpO2 100%  BMI 56.7 kg/m2  Physical Exam  General: Resting in bed, NAD  HEENT: Normocephalic, mucous membranes moist, pupils equal  Neck: Supple, trachea midline  Cardiovascular: Regular rate and rhythm, S1-S2, no murmur  Respiratory: decreased in bases, even unlabored breathing on nasal cannula at 2 liters  Abdomen: Soft, nontender, nondistended, + bowel sounds, obese  Skin: Clean, dry, intact, no lesions or sores  Extremities: SUTTON, Symmetrical, pulses +, 2+ pitting edema (improving)  Neuro: Alert oriented ×4, appropriate and cooperative, anxiety improved    Discharge Disposition  Rehab Facility or Unit (DC - External)    Discharge Medications   Joy Bueno   Home Medication Instructions JESSICA:[id number]    Printed on:08/12/17 1232   Medication Information                      acetaminophen (TYLENOL) 325 MG tablet  Take 650 mg by mouth Every 4 (Four) Hours As Needed for Mild Pain (1-3).             albuterol (PROVENTIL HFA;VENTOLIN HFA) 108 (90 BASE) MCG/ACT inhaler  Inhale 2 puffs Every 4 (Four) Hours As Needed for Wheezing.             aluminum-magnesium hydroxide-simethicone (MAALOX/MYLANTA) 200-200-20 MG/5ML suspension  Take 30 mL by mouth As Needed for Indigestion or Heartburn (For upset stomach).             amLODIPine (NORVASC) 5 MG tablet  Take 1 tablet by mouth Daily.             atorvastatin (LIPITOR) 10 MG tablet  Take 10 mg by mouth Every Night.             clonazePAM (KlonoPIN) 0.5 MG tablet  Take 0.5 tablets by mouth 2 (Two) Times a Day As Needed for Anxiety for up to 3 days.             docusate sodium (COLACE) 100 MG capsule  Take 100 mg by mouth Daily. Hold for loose stool             escitalopram (LEXAPRO) 10 MG tablet  Take 1 tablet by mouth Daily.             furosemide (LASIX) 40 MG tablet  Take 40 mg by mouth 2 (Two) Times a Day.             hydrALAZINE (APRESOLINE) 50 MG " tablet  Take 1 tablet by mouth 3 (Three) Times a Day.             HYDROcodone-acetaminophen (NORCO) 5-325 MG per tablet  Take 1 tablet by mouth Every 4 (Four) Hours As Needed for Moderate Pain (4-6) for up to 3 days.             insulin lispro (humaLOG) 100 UNIT/ML injection  Inject 0-7 Units under the skin 4 (Four) Times a Day Before Meals & at Bedtime.             isosorbide mononitrate (IMDUR) 60 MG 24 hr tablet  Take 1 tablet by mouth Daily.             magnesium hydroxide (MILK OF MAGNESIA) 400 MG/5ML suspension  Take 10 mL by mouth Daily As Needed for Constipation.             mometasone-formoterol (DULERA 200) 200-5 MCG/ACT inhaler  Inhale 2 puffs 2 (Two) Times a Day.             Multiple Vitamins-Minerals (MULTIVITAMIN ADULTS PO)  Take 1 tablet by mouth Daily.             polyethylene glycol (MIRALAX) packet  Take 17 g by mouth Daily As Needed.             saccharomyces boulardii (FLORASTOR) 250 MG capsule  Take 1 capsule by mouth 2 (Two) Times a Day.             simethicone (MYLICON) 80 MG chewable tablet  Chew 80 mg Every 6 (Six) Hours As Needed for Flatulence.               Discharge Diet:   Diet Instructions     Diet: Regular, Consistent Carbohydrate, Cardiac, Renal       Discharge Diet:   Regular  Consistent Carbohydrate  Cardiac  Renal                  Discharge Care Plan / Instructions:    Activity at Discharge:   Activity Instructions     Activity as Tolerated       Up with assistance  Fall precautions               Follow-up Appointments  No future appointments.  Additional Instructions for the Follow-ups that You Need to Schedule     Discharge Follow-up with PCP    As directed    Follow Up Details:  within 1 week of discharge from rehab       Discharge Follow-up with Specialty    As directed    Specialty:  NAL   Follow Up:  2 Weeks               Test Results Pending at Discharge   Order Current Status    PTH, Intact In process    Tissue Pathology Exam In process           Krystin Grace, APRN  08/12/17 12:32 PM    Time: Discharge 45 min    Please note that portions of this note may have been completed with a voice recognition program. Efforts were made to edit the dictations, but occasionally words are mistranscribed.

## 2017-08-14 LAB — PTH-INTACT SERPL-MCNC: 125 PG/ML (ref 15–65)

## 2017-08-14 NOTE — THERAPY DISCHARGE NOTE
Acute Care - Occupational Therapy Discharge Summary  [facility]     Patient Name: Joy Bueno  : 1950  MRN: [id number]    Today's Date: 2017  Onset of Illness/Injury or Date of Surgery Date: 17    Date of Referral to OT: 17  Referring Physician: SUELLEN Fischer      Admit Date: 8/3/2017        OT Recommendation and Plan    Visit Dx:    ICD-10-CM ICD-9-CM   1. Renal failure N19 586   2. Symptomatic anemia D64.9 285.9   3. Acute cystitis without hematuria N30.00 595.0   4. Morbid obesity due to excess calories E66.01 278.01   5. Adult failure to thrive syndrome R62.7 783.7   6. Impaired functional mobility, balance, gait, and endurance Z74.09 V49.89                     OT Goals       17 1043 17 1059       Bed Mobility OT LTG    Bed Mobility OT LTG, Date Established  17  -AC     Bed Mobility OT LTG, Time to Achieve  by discharge  -AC     Bed Mobility OT LTG, Activity Type  supine to sit/sit to supine  -AC     Bed Mobility OT LTG, [place] Level  moderate assist (50% patient effort);2 person assist required  -AC     Bed Mobility OT LTG, Assist Device  bed rails  -AC     Bed Mobility OT LTG, Outcome goal ongoing  -AC      Transfer Training OT LTG    Transfer Training OT LTG, Date Established  17  -AC     Transfer Training OT LTG, Time to Achieve  by discharge  -AC     Transfer Training OT LTG, Activity Type  sit to stand/stand to sit;bed to chair /chair to bed;toilet  -AC     Transfer Training OT LTG, [place] Level  moderate assist (50% patient effort);2 person assist required  -AC     Transfer Training OT LTG, Assist Device  walker, rolling  -AC     Transfer Training OT LTG, Outcome goal ongoing   unable to perform  -AC      Strength OT LTG    Strength Goal OT LTG, Date Established  17  -AC     Strength Goal OT LTG, Time to Achieve  by discharge  -AC     Strength Goal OT LTG, Measure to Achieve  Pt will complete 2 sets x 15 BUE AROM as needed to support  ADLs  -AC     Strength Goal OT LTG, Outcome goal ongoing   completed 1 set x 15  -AC      Dynamic Sitting Balance OT LTG    Dynamic Sitting Balance OT LTG, Date Established  08/04/17  -AC     Dynamic Sitting Balance OT LTG, Time to Achieve  by discharge  -AC     Dynamic Sitting Balance OT LTG, [place] Level  contact guard assist  -AC     Dynamic Sitting Balance OT LTG, Assist Device  UE Support  -AC     Dynamic Sitting Balance OT LTG, Outcome goal ongoing  -AC      Grooming OT LTG    Grooming Goal OT LTG, Date Established  08/04/17  -AC     Grooming Goal OT LTG, Time to Achieve  by discharge  -AC     Grooming Goal OT LTG, Activity Type  Pt will wash face and brush teeth after setup  -AC     Grooming Goal OT LTG, [place] Level  set up required  -AC     Grooming Goal OT LTG, Outcome goal partially met   met for brushing teeth  -AC        User Key  (r) = Recorded By, (t) = Taken By, (c) = Cosigned By    Initials Name Provider Type    ROSA Orellana, OT Occupational Therapist                  OT Discharge Summary  Reason for Discharge: Discharge from facility  Outcomes Achieved: Refer to plan of care for updates on goals achieved  Discharge Destination: Inpatient rehabilitation facility      Danii Mendoza OT  8/14/2017

## 2017-08-17 LAB
LAB AP CASE REPORT: NORMAL
Lab: NORMAL
PATH REPORT.FINAL DX SPEC: NORMAL

## 2017-08-23 ENCOUNTER — HOSPITAL ENCOUNTER (INPATIENT)
Facility: HOSPITAL | Age: 67
LOS: 6 days | Discharge: NURSING, REHAB OR LONG-TERM CARE | End: 2017-08-30
Attending: EMERGENCY MEDICINE | Admitting: INTERNAL MEDICINE

## 2017-08-23 DIAGNOSIS — R53.1 GENERALIZED WEAKNESS: Primary | ICD-10-CM

## 2017-08-23 DIAGNOSIS — D64.9 SEVERE ANEMIA: ICD-10-CM

## 2017-08-23 DIAGNOSIS — Z74.09 IMPAIRED FUNCTIONAL MOBILITY, BALANCE, GAIT, AND ENDURANCE: ICD-10-CM

## 2017-08-23 DIAGNOSIS — Z74.09 IMPAIRED MOBILITY AND ADLS: ICD-10-CM

## 2017-08-23 DIAGNOSIS — Z78.9 IMPAIRED MOBILITY AND ADLS: ICD-10-CM

## 2017-08-23 DIAGNOSIS — N18.9 CHRONIC KIDNEY DISEASE, UNSPECIFIED STAGE: ICD-10-CM

## 2017-08-23 LAB
ABO GROUP BLD: NORMAL
ALBUMIN SERPL-MCNC: 3.2 G/DL (ref 3.2–4.8)
ALBUMIN/GLOB SERPL: 0.7 G/DL (ref 1.5–2.5)
ALP SERPL-CCNC: 102 U/L (ref 25–100)
ALT SERPL W P-5'-P-CCNC: 14 U/L (ref 7–40)
ANION GAP SERPL CALCULATED.3IONS-SCNC: 0 MMOL/L (ref 3–11)
AST SERPL-CCNC: 25 U/L (ref 0–33)
BACTERIA UR QL AUTO: ABNORMAL /HPF
BASOPHILS # BLD AUTO: 0.02 10*3/MM3 (ref 0–0.2)
BASOPHILS NFR BLD AUTO: 0.2 % (ref 0–1)
BILIRUB SERPL-MCNC: 0.2 MG/DL (ref 0.3–1.2)
BILIRUB UR QL STRIP: NEGATIVE
BLD GP AB SCN SERPL QL: NEGATIVE
BUN BLD-MCNC: 61 MG/DL (ref 9–23)
BUN/CREAT SERPL: 25.4 (ref 7–25)
CALCIUM SPEC-SCNC: 8.8 MG/DL (ref 8.7–10.4)
CHLORIDE SERPL-SCNC: 106 MMOL/L (ref 99–109)
CLARITY UR: ABNORMAL
CO2 SERPL-SCNC: 34 MMOL/L (ref 20–31)
COLOR UR: YELLOW
CREAT BLD-MCNC: 2.4 MG/DL (ref 0.6–1.3)
DEPRECATED RDW RBC AUTO: 53.6 FL (ref 37–54)
DEVELOPER EXPIRATION DATE: NORMAL
DEVELOPER LOT NUMBER: NORMAL
EOSINOPHIL # BLD AUTO: 0.16 10*3/MM3 (ref 0–0.3)
EOSINOPHIL NFR BLD AUTO: 1.6 % (ref 0–3)
ERYTHROCYTE [DISTWIDTH] IN BLOOD BY AUTOMATED COUNT: 15.8 % (ref 11.3–14.5)
EXPIRATION DATE: NORMAL
FECAL OCCULT BLOOD SCREEN, POC: NEGATIVE
GFR SERPL CREATININE-BSD FRML MDRD: 24 ML/MIN/1.73
GLOBULIN UR ELPH-MCNC: 4.3 GM/DL
GLUCOSE BLD-MCNC: 153 MG/DL (ref 70–100)
GLUCOSE UR STRIP-MCNC: NEGATIVE MG/DL
HCT VFR BLD AUTO: 24.8 % (ref 34.5–44)
HGB BLD-MCNC: 7.6 G/DL (ref 11.5–15.5)
HGB UR QL STRIP.AUTO: NEGATIVE
HOLD SPECIMEN: NORMAL
HYALINE CASTS UR QL AUTO: ABNORMAL /LPF
IMM GRANULOCYTES # BLD: 0.05 10*3/MM3 (ref 0–0.03)
IMM GRANULOCYTES NFR BLD: 0.5 % (ref 0–0.6)
INR PPP: 0.95
KETONES UR QL STRIP: NEGATIVE
LEUKOCYTE ESTERASE UR QL STRIP.AUTO: NEGATIVE
LYMPHOCYTES # BLD AUTO: 1.91 10*3/MM3 (ref 0.6–4.8)
LYMPHOCYTES NFR BLD AUTO: 18.6 % (ref 24–44)
Lab: NORMAL
MCH RBC QN AUTO: 27.9 PG (ref 27–31)
MCHC RBC AUTO-ENTMCNC: 30.6 G/DL (ref 32–36)
MCV RBC AUTO: 91.2 FL (ref 80–99)
MONOCYTES # BLD AUTO: 0.54 10*3/MM3 (ref 0–1)
MONOCYTES NFR BLD AUTO: 5.3 % (ref 0–12)
NEGATIVE CONTROL: NEGATIVE
NEUTROPHILS # BLD AUTO: 7.58 10*3/MM3 (ref 1.5–8.3)
NEUTROPHILS NFR BLD AUTO: 73.8 % (ref 41–71)
NITRITE UR QL STRIP: NEGATIVE
PH UR STRIP.AUTO: 5.5 [PH] (ref 5–8)
PLATELET # BLD AUTO: 283 10*3/MM3 (ref 150–450)
PMV BLD AUTO: 9.5 FL (ref 6–12)
POSITIVE CONTROL: POSITIVE
POTASSIUM BLD-SCNC: 4.6 MMOL/L (ref 3.5–5.5)
PROT SERPL-MCNC: 7.5 G/DL (ref 5.7–8.2)
PROT UR QL STRIP: ABNORMAL
PROTHROMBIN TIME: 10.3 SECONDS (ref 9.6–11.5)
RBC # BLD AUTO: 2.72 10*6/MM3 (ref 3.89–5.14)
RBC # UR: ABNORMAL /HPF
REF LAB TEST METHOD: ABNORMAL
RH BLD: POSITIVE
SODIUM BLD-SCNC: 140 MMOL/L (ref 132–146)
SP GR UR STRIP: 1.02 (ref 1–1.03)
SQUAMOUS #/AREA URNS HPF: ABNORMAL /HPF
UROBILINOGEN UR QL STRIP: ABNORMAL
WBC NRBC COR # BLD: 10.26 10*3/MM3 (ref 3.5–10.8)
WBC UR QL AUTO: ABNORMAL /HPF
WHOLE BLOOD HOLD SPECIMEN: NORMAL
WHOLE BLOOD HOLD SPECIMEN: NORMAL

## 2017-08-23 PROCEDURE — 82607 VITAMIN B-12: CPT | Performed by: NURSE PRACTITIONER

## 2017-08-23 PROCEDURE — 86900 BLOOD TYPING SEROLOGIC ABO: CPT | Performed by: EMERGENCY MEDICINE

## 2017-08-23 PROCEDURE — 99285 EMERGENCY DEPT VISIT HI MDM: CPT

## 2017-08-23 PROCEDURE — 82746 ASSAY OF FOLIC ACID SERUM: CPT | Performed by: NURSE PRACTITIONER

## 2017-08-23 PROCEDURE — 83550 IRON BINDING TEST: CPT | Performed by: NURSE PRACTITIONER

## 2017-08-23 PROCEDURE — 86850 RBC ANTIBODY SCREEN: CPT | Performed by: EMERGENCY MEDICINE

## 2017-08-23 PROCEDURE — 86920 COMPATIBILITY TEST SPIN: CPT

## 2017-08-23 PROCEDURE — 81001 URINALYSIS AUTO W/SCOPE: CPT | Performed by: EMERGENCY MEDICINE

## 2017-08-23 PROCEDURE — G0378 HOSPITAL OBSERVATION PER HR: HCPCS

## 2017-08-23 PROCEDURE — 85025 COMPLETE CBC W/AUTO DIFF WBC: CPT | Performed by: EMERGENCY MEDICINE

## 2017-08-23 PROCEDURE — 85610 PROTHROMBIN TIME: CPT | Performed by: EMERGENCY MEDICINE

## 2017-08-23 PROCEDURE — 86923 COMPATIBILITY TEST ELECTRIC: CPT

## 2017-08-23 PROCEDURE — 86901 BLOOD TYPING SEROLOGIC RH(D): CPT | Performed by: EMERGENCY MEDICINE

## 2017-08-23 PROCEDURE — 82728 ASSAY OF FERRITIN: CPT | Performed by: NURSE PRACTITIONER

## 2017-08-23 PROCEDURE — 83540 ASSAY OF IRON: CPT | Performed by: NURSE PRACTITIONER

## 2017-08-23 PROCEDURE — 80053 COMPREHEN METABOLIC PANEL: CPT | Performed by: EMERGENCY MEDICINE

## 2017-08-23 RX ORDER — SODIUM CHLORIDE 0.9 % (FLUSH) 0.9 %
10 SYRINGE (ML) INJECTION AS NEEDED
Status: DISCONTINUED | OUTPATIENT
Start: 2017-08-23 | End: 2017-08-30 | Stop reason: HOSPADM

## 2017-08-24 PROBLEM — D64.9 ANEMIA: Status: ACTIVE | Noted: 2017-08-24

## 2017-08-24 PROBLEM — D64.9 SEVERE ANEMIA: Status: ACTIVE | Noted: 2017-08-23

## 2017-08-24 LAB
ABO GROUP BLD: NORMAL
ANION GAP SERPL CALCULATED.3IONS-SCNC: 6 MMOL/L (ref 3–11)
BASOPHILS # BLD AUTO: 0.02 10*3/MM3 (ref 0–0.2)
BASOPHILS NFR BLD AUTO: 0.2 % (ref 0–1)
BLD GP AB SCN SERPL QL: NEGATIVE
BUN BLD-MCNC: 62 MG/DL (ref 9–23)
BUN/CREAT SERPL: 28.2 (ref 7–25)
CALCIUM SPEC-SCNC: 8.3 MG/DL (ref 8.7–10.4)
CHLORIDE SERPL-SCNC: 104 MMOL/L (ref 99–109)
CO2 SERPL-SCNC: 31 MMOL/L (ref 20–31)
CREAT BLD-MCNC: 2.2 MG/DL (ref 0.6–1.3)
CYTOLOGIST CVX/VAG CYTO: NORMAL
DEPRECATED RDW RBC AUTO: 54.9 FL (ref 37–54)
EOSINOPHIL # BLD AUTO: 0.23 10*3/MM3 (ref 0–0.3)
EOSINOPHIL NFR BLD AUTO: 2.5 % (ref 0–3)
ERYTHROCYTE [DISTWIDTH] IN BLOOD BY AUTOMATED COUNT: 16.1 % (ref 11.3–14.5)
FERRITIN SERPL-MCNC: 468 NG/ML (ref 10–291)
FOLATE SERPL-MCNC: 9.53 NG/ML (ref 3.2–20)
GFR SERPL CREATININE-BSD FRML MDRD: 27 ML/MIN/1.73
GLUCOSE BLD-MCNC: 179 MG/DL (ref 70–100)
GLUCOSE BLDC GLUCOMTR-MCNC: 121 MG/DL (ref 70–130)
GLUCOSE BLDC GLUCOMTR-MCNC: 157 MG/DL (ref 70–130)
GLUCOSE BLDC GLUCOMTR-MCNC: 162 MG/DL (ref 70–130)
GLUCOSE BLDC GLUCOMTR-MCNC: 220 MG/DL (ref 70–130)
HCT VFR BLD AUTO: 24.2 % (ref 34.5–44)
HGB BLD-MCNC: 7.1 G/DL (ref 11.5–15.5)
IMM GRANULOCYTES # BLD: 0.02 10*3/MM3 (ref 0–0.03)
IMM GRANULOCYTES NFR BLD: 0.2 % (ref 0–0.6)
IRON 24H UR-MRATE: 29 MCG/DL (ref 50–175)
IRON 24H UR-MRATE: 29 MCG/DL (ref 50–175)
IRON SATN MFR SERPL: 14 % (ref 15–50)
LYMPHOCYTES # BLD AUTO: 1.88 10*3/MM3 (ref 0.6–4.8)
LYMPHOCYTES NFR BLD AUTO: 20.8 % (ref 24–44)
MCH RBC QN AUTO: 27.1 PG (ref 27–31)
MCHC RBC AUTO-ENTMCNC: 29.3 G/DL (ref 32–36)
MCV RBC AUTO: 92.4 FL (ref 80–99)
MONOCYTES # BLD AUTO: 0.53 10*3/MM3 (ref 0–1)
MONOCYTES NFR BLD AUTO: 5.9 % (ref 0–12)
NEUTROPHILS # BLD AUTO: 6.36 10*3/MM3 (ref 1.5–8.3)
NEUTROPHILS NFR BLD AUTO: 70.4 % (ref 41–71)
PATH INTERP BLD-IMP: NORMAL
PLAT MORPH BLD: NORMAL
PLATELET # BLD AUTO: 263 10*3/MM3 (ref 150–450)
PMV BLD AUTO: 9.6 FL (ref 6–12)
POTASSIUM BLD-SCNC: 4.1 MMOL/L (ref 3.5–5.5)
RBC # BLD AUTO: 2.62 10*6/MM3 (ref 3.89–5.14)
RBC MORPH BLD: NORMAL
RETICS/RBC NFR AUTO: 2.07 % (ref 0.5–1.5)
RH BLD: POSITIVE
SODIUM BLD-SCNC: 141 MMOL/L (ref 132–146)
TIBC SERPL-MCNC: 208 MCG/DL (ref 250–450)
VIT B12 BLD-MCNC: 891 PG/ML (ref 211–911)
WBC MORPH BLD: NORMAL
WBC NRBC COR # BLD: 9.04 10*3/MM3 (ref 3.5–10.8)

## 2017-08-24 PROCEDURE — 85025 COMPLETE CBC W/AUTO DIFF WBC: CPT | Performed by: NURSE PRACTITIONER

## 2017-08-24 PROCEDURE — 86900 BLOOD TYPING SEROLOGIC ABO: CPT | Performed by: NURSE PRACTITIONER

## 2017-08-24 PROCEDURE — 82962 GLUCOSE BLOOD TEST: CPT

## 2017-08-24 PROCEDURE — 99220 PR INITIAL OBSERVATION CARE/DAY 70 MINUTES: CPT | Performed by: NURSE PRACTITIONER

## 2017-08-24 PROCEDURE — 80048 BASIC METABOLIC PNL TOTAL CA: CPT | Performed by: NURSE PRACTITIONER

## 2017-08-24 PROCEDURE — 94799 UNLISTED PULMONARY SVC/PX: CPT

## 2017-08-24 PROCEDURE — 86850 RBC ANTIBODY SCREEN: CPT | Performed by: NURSE PRACTITIONER

## 2017-08-24 PROCEDURE — 99221 1ST HOSP IP/OBS SF/LOW 40: CPT | Performed by: INTERNAL MEDICINE

## 2017-08-24 PROCEDURE — 85007 BL SMEAR W/DIFF WBC COUNT: CPT | Performed by: NURSE PRACTITIONER

## 2017-08-24 PROCEDURE — 86901 BLOOD TYPING SEROLOGIC RH(D): CPT | Performed by: NURSE PRACTITIONER

## 2017-08-24 PROCEDURE — 85014 HEMATOCRIT: CPT | Performed by: NURSE PRACTITIONER

## 2017-08-24 PROCEDURE — 63710000001 INSULIN LISPRO (HUMAN) PER 5 UNITS: Performed by: NURSE PRACTITIONER

## 2017-08-24 PROCEDURE — 82608 B-12 BINDING CAPACITY: CPT | Performed by: NURSE PRACTITIONER

## 2017-08-24 PROCEDURE — 94640 AIRWAY INHALATION TREATMENT: CPT

## 2017-08-24 PROCEDURE — 85060 BLOOD SMEAR INTERPRETATION: CPT | Performed by: NURSE PRACTITIONER

## 2017-08-24 PROCEDURE — 99225 PR SBSQ OBSERVATION CARE/DAY 25 MINUTES: CPT | Performed by: NURSE PRACTITIONER

## 2017-08-24 PROCEDURE — 85045 AUTOMATED RETICULOCYTE COUNT: CPT | Performed by: NURSE PRACTITIONER

## 2017-08-24 PROCEDURE — 83010 ASSAY OF HAPTOGLOBIN QUANT: CPT | Performed by: NURSE PRACTITIONER

## 2017-08-24 PROCEDURE — 94760 N-INVAS EAR/PLS OXIMETRY 1: CPT

## 2017-08-24 PROCEDURE — 82747 ASSAY OF FOLIC ACID RBC: CPT | Performed by: NURSE PRACTITIONER

## 2017-08-24 RX ORDER — DEXTROSE MONOHYDRATE 25 G/50ML
25 INJECTION, SOLUTION INTRAVENOUS
Status: DISCONTINUED | OUTPATIENT
Start: 2017-08-24 | End: 2017-08-30 | Stop reason: HOSPADM

## 2017-08-24 RX ORDER — BUDESONIDE AND FORMOTEROL FUMARATE DIHYDRATE 160; 4.5 UG/1; UG/1
2 AEROSOL RESPIRATORY (INHALATION)
Status: DISCONTINUED | OUTPATIENT
Start: 2017-08-24 | End: 2017-08-30 | Stop reason: HOSPADM

## 2017-08-24 RX ORDER — LACTULOSE 10 G/15ML
20 SOLUTION ORAL NIGHTLY
COMMUNITY
End: 2017-10-12 | Stop reason: HOSPADM

## 2017-08-24 RX ORDER — POLYETHYLENE GLYCOL 3350 17 G/17G
17 POWDER, FOR SOLUTION ORAL DAILY
Status: DISCONTINUED | OUTPATIENT
Start: 2017-08-24 | End: 2017-08-30 | Stop reason: HOSPADM

## 2017-08-24 RX ORDER — NUT.TX.GLUCOSE INTOLERANCE,SOY
30 BAR ORAL 2 TIMES DAILY
COMMUNITY
End: 2017-08-30 | Stop reason: HOSPADM

## 2017-08-24 RX ORDER — ESCITALOPRAM OXALATE 10 MG/1
10 TABLET ORAL DAILY
Status: DISCONTINUED | OUTPATIENT
Start: 2017-08-24 | End: 2017-08-30 | Stop reason: HOSPADM

## 2017-08-24 RX ORDER — HYDROCODONE BITARTRATE AND ACETAMINOPHEN 5; 325 MG/1; MG/1
1 TABLET ORAL EVERY 4 HOURS PRN
Status: ON HOLD | COMMUNITY
End: 2017-10-12

## 2017-08-24 RX ORDER — ACETAMINOPHEN 325 MG/1
650 TABLET ORAL EVERY 6 HOURS PRN
Status: DISCONTINUED | OUTPATIENT
Start: 2017-08-24 | End: 2017-08-30 | Stop reason: HOSPADM

## 2017-08-24 RX ORDER — HYDRALAZINE HYDROCHLORIDE 25 MG/1
25 TABLET, FILM COATED ORAL EVERY 8 HOURS SCHEDULED
Status: DISCONTINUED | OUTPATIENT
Start: 2017-08-24 | End: 2017-08-28

## 2017-08-24 RX ORDER — MULTIPLE VITAMINS W/ MINERALS TAB 9MG-400MCG
1 TAB ORAL DAILY
Status: DISCONTINUED | OUTPATIENT
Start: 2017-08-24 | End: 2017-08-30 | Stop reason: HOSPADM

## 2017-08-24 RX ORDER — ATORVASTATIN CALCIUM 10 MG/1
10 TABLET, FILM COATED ORAL NIGHTLY
Status: DISCONTINUED | OUTPATIENT
Start: 2017-08-24 | End: 2017-08-30 | Stop reason: HOSPADM

## 2017-08-24 RX ORDER — CLONAZEPAM 0.5 MG/1
0.25 TABLET ORAL EVERY 12 HOURS PRN
Status: DISCONTINUED | OUTPATIENT
Start: 2017-08-24 | End: 2017-08-30 | Stop reason: HOSPADM

## 2017-08-24 RX ORDER — CLONAZEPAM 0.5 MG/1
0.25 TABLET ORAL 2 TIMES DAILY PRN
Status: ON HOLD | COMMUNITY
End: 2017-10-12

## 2017-08-24 RX ORDER — SODIUM CHLORIDE 0.9 % (FLUSH) 0.9 %
1-10 SYRINGE (ML) INJECTION AS NEEDED
Status: DISCONTINUED | OUTPATIENT
Start: 2017-08-24 | End: 2017-08-30 | Stop reason: HOSPADM

## 2017-08-24 RX ORDER — ISOSORBIDE MONONITRATE 60 MG/1
60 TABLET, EXTENDED RELEASE ORAL DAILY
Status: DISCONTINUED | OUTPATIENT
Start: 2017-08-24 | End: 2017-08-30 | Stop reason: HOSPADM

## 2017-08-24 RX ORDER — NICOTINE POLACRILEX 4 MG
15 LOZENGE BUCCAL
Status: DISCONTINUED | OUTPATIENT
Start: 2017-08-24 | End: 2017-08-30 | Stop reason: HOSPADM

## 2017-08-24 RX ORDER — SACCHAROMYCES BOULARDII 250 MG
250 CAPSULE ORAL 2 TIMES DAILY
Status: DISCONTINUED | OUTPATIENT
Start: 2017-08-24 | End: 2017-08-30 | Stop reason: HOSPADM

## 2017-08-24 RX ORDER — CAPSAICIN 0.75 MG/G
1 CREAM TOPICAL 3 TIMES DAILY
COMMUNITY
End: 2017-08-30 | Stop reason: HOSPADM

## 2017-08-24 RX ORDER — HYDROCODONE BITARTRATE AND ACETAMINOPHEN 5; 325 MG/1; MG/1
1 TABLET ORAL EVERY 6 HOURS PRN
Status: DISCONTINUED | OUTPATIENT
Start: 2017-08-24 | End: 2017-08-30 | Stop reason: HOSPADM

## 2017-08-24 RX ORDER — DOCUSATE SODIUM 100 MG/1
100 CAPSULE, LIQUID FILLED ORAL DAILY
Status: DISCONTINUED | OUTPATIENT
Start: 2017-08-24 | End: 2017-08-30 | Stop reason: HOSPADM

## 2017-08-24 RX ORDER — AMLODIPINE BESYLATE 5 MG/1
5 TABLET ORAL DAILY
Status: DISCONTINUED | OUTPATIENT
Start: 2017-08-24 | End: 2017-08-30 | Stop reason: HOSPADM

## 2017-08-24 RX ORDER — FUROSEMIDE 40 MG/1
40 TABLET ORAL 2 TIMES DAILY
Status: DISCONTINUED | OUTPATIENT
Start: 2017-08-24 | End: 2017-08-30 | Stop reason: HOSPADM

## 2017-08-24 RX ORDER — LACTULOSE 10 G/15ML
20 SOLUTION ORAL NIGHTLY
Status: DISCONTINUED | OUTPATIENT
Start: 2017-08-24 | End: 2017-08-30 | Stop reason: HOSPADM

## 2017-08-24 RX ADMIN — Medication 250 MG: at 08:54

## 2017-08-24 RX ADMIN — LACTULOSE 20 G: 10 SOLUTION ORAL at 21:39

## 2017-08-24 RX ADMIN — POLYETHYLENE GLYCOL 3350 17 G: 17 POWDER, FOR SOLUTION ORAL at 08:54

## 2017-08-24 RX ADMIN — FUROSEMIDE 40 MG: 40 TABLET ORAL at 18:36

## 2017-08-24 RX ADMIN — INSULIN LISPRO 2 UNITS: 100 INJECTION, SOLUTION INTRAVENOUS; SUBCUTANEOUS at 13:15

## 2017-08-24 RX ADMIN — ISOSORBIDE MONONITRATE 60 MG: 60 TABLET, EXTENDED RELEASE ORAL at 08:54

## 2017-08-24 RX ADMIN — CLONAZEPAM 0.25 MG: 0.5 TABLET ORAL at 21:42

## 2017-08-24 RX ADMIN — CLONAZEPAM 0.25 MG: 0.5 TABLET ORAL at 03:08

## 2017-08-24 RX ADMIN — FUROSEMIDE 40 MG: 40 TABLET ORAL at 08:54

## 2017-08-24 RX ADMIN — HYDROCODONE BITARTRATE AND ACETAMINOPHEN 1 TABLET: 5; 325 TABLET ORAL at 21:42

## 2017-08-24 RX ADMIN — INSULIN LISPRO 2 UNITS: 100 INJECTION, SOLUTION INTRAVENOUS; SUBCUTANEOUS at 08:54

## 2017-08-24 RX ADMIN — DOCUSATE SODIUM 100 MG: 100 CAPSULE, LIQUID FILLED ORAL at 08:54

## 2017-08-24 RX ADMIN — ATORVASTATIN CALCIUM 10 MG: 10 TABLET, FILM COATED ORAL at 21:39

## 2017-08-24 RX ADMIN — HYDRALAZINE HYDROCHLORIDE 25 MG: 25 TABLET ORAL at 13:15

## 2017-08-24 RX ADMIN — ESCITALOPRAM OXALATE 10 MG: 10 TABLET ORAL at 08:54

## 2017-08-24 RX ADMIN — AMLODIPINE BESYLATE 5 MG: 5 TABLET ORAL at 08:54

## 2017-08-24 RX ADMIN — MULTIPLE VITAMINS W/ MINERALS TAB 1 TABLET: TAB ORAL at 08:54

## 2017-08-24 RX ADMIN — INSULIN LISPRO 3 UNITS: 100 INJECTION, SOLUTION INTRAVENOUS; SUBCUTANEOUS at 21:43

## 2017-08-24 RX ADMIN — HYDROCODONE BITARTRATE AND ACETAMINOPHEN 1 TABLET: 5; 325 TABLET ORAL at 03:08

## 2017-08-24 RX ADMIN — HYDRALAZINE HYDROCHLORIDE 25 MG: 25 TABLET ORAL at 06:53

## 2017-08-24 RX ADMIN — HYDRALAZINE HYDROCHLORIDE 25 MG: 25 TABLET ORAL at 21:39

## 2017-08-24 RX ADMIN — BUDESONIDE AND FORMOTEROL FUMARATE DIHYDRATE 2 PUFF: 160; 4.5 AEROSOL RESPIRATORY (INHALATION) at 19:09

## 2017-08-24 RX ADMIN — HYDROCODONE BITARTRATE AND ACETAMINOPHEN 1 TABLET: 5; 325 TABLET ORAL at 12:04

## 2017-08-24 RX ADMIN — BUDESONIDE AND FORMOTEROL FUMARATE DIHYDRATE 2 PUFF: 160; 4.5 AEROSOL RESPIRATORY (INHALATION) at 07:48

## 2017-08-24 RX ADMIN — Medication 250 MG: at 18:36

## 2017-08-25 ENCOUNTER — ANESTHESIA EVENT (OUTPATIENT)
Dept: GASTROENTEROLOGY | Facility: HOSPITAL | Age: 67
End: 2017-08-25

## 2017-08-25 ENCOUNTER — ANESTHESIA (OUTPATIENT)
Dept: GASTROENTEROLOGY | Facility: HOSPITAL | Age: 67
End: 2017-08-25

## 2017-08-25 LAB
ANION GAP SERPL CALCULATED.3IONS-SCNC: 6 MMOL/L (ref 3–11)
BASOPHILS # BLD AUTO: 0.01 10*3/MM3 (ref 0–0.2)
BASOPHILS NFR BLD AUTO: 0.1 % (ref 0–1)
BUN BLD-MCNC: 68 MG/DL (ref 9–23)
BUN/CREAT SERPL: 30.9 (ref 7–25)
CALCIUM SPEC-SCNC: 8.8 MG/DL (ref 8.7–10.4)
CHLORIDE SERPL-SCNC: 103 MMOL/L (ref 99–109)
CO2 SERPL-SCNC: 32 MMOL/L (ref 20–31)
CREAT BLD-MCNC: 2.2 MG/DL (ref 0.6–1.3)
DEPRECATED RDW RBC AUTO: 54.2 FL (ref 37–54)
EOSINOPHIL # BLD AUTO: 0.26 10*3/MM3 (ref 0–0.3)
EOSINOPHIL NFR BLD AUTO: 3.5 % (ref 0–3)
ERYTHROCYTE [DISTWIDTH] IN BLOOD BY AUTOMATED COUNT: 15.9 % (ref 11.3–14.5)
FOLATE BLD-MCNC: 321.1 NG/ML
FOLATE RBC-MCNC: 1473 NG/ML
GFR SERPL CREATININE-BSD FRML MDRD: 27 ML/MIN/1.73
GLUCOSE BLD-MCNC: 122 MG/DL (ref 70–100)
GLUCOSE BLDC GLUCOMTR-MCNC: 127 MG/DL (ref 70–130)
GLUCOSE BLDC GLUCOMTR-MCNC: 174 MG/DL (ref 70–130)
GLUCOSE BLDC GLUCOMTR-MCNC: 201 MG/DL (ref 70–130)
GLUCOSE BLDC GLUCOMTR-MCNC: 234 MG/DL (ref 70–130)
HAPTOGLOB SERPL-MCNC: 442 MG/DL (ref 34–200)
HCT VFR BLD AUTO: 21.8 % (ref 34–46.6)
HCT VFR BLD AUTO: 22.4 % (ref 34.5–44)
HCT VFR BLD AUTO: 26.5 % (ref 34.5–44)
HGB BLD-MCNC: 6.6 G/DL (ref 11.5–15.5)
HGB BLD-MCNC: 8 G/DL (ref 11.5–15.5)
IMM GRANULOCYTES # BLD: 0.03 10*3/MM3 (ref 0–0.03)
IMM GRANULOCYTES NFR BLD: 0.4 % (ref 0–0.6)
LYMPHOCYTES # BLD AUTO: 2.04 10*3/MM3 (ref 0.6–4.8)
LYMPHOCYTES NFR BLD AUTO: 27.5 % (ref 24–44)
MCH RBC QN AUTO: 27.3 PG (ref 27–31)
MCHC RBC AUTO-ENTMCNC: 29.5 G/DL (ref 32–36)
MCV RBC AUTO: 92.6 FL (ref 80–99)
MONOCYTES # BLD AUTO: 0.51 10*3/MM3 (ref 0–1)
MONOCYTES NFR BLD AUTO: 6.9 % (ref 0–12)
NEUTROPHILS # BLD AUTO: 4.58 10*3/MM3 (ref 1.5–8.3)
NEUTROPHILS NFR BLD AUTO: 61.6 % (ref 41–71)
PLATELET # BLD AUTO: 287 10*3/MM3 (ref 150–450)
PMV BLD AUTO: 9.6 FL (ref 6–12)
POTASSIUM BLD-SCNC: 4.2 MMOL/L (ref 3.5–5.5)
RBC # BLD AUTO: 2.42 10*6/MM3 (ref 3.89–5.14)
SODIUM BLD-SCNC: 141 MMOL/L (ref 132–146)
WBC NRBC COR # BLD: 7.43 10*3/MM3 (ref 3.5–10.8)

## 2017-08-25 PROCEDURE — 36430 TRANSFUSION BLD/BLD COMPNT: CPT

## 2017-08-25 PROCEDURE — 99232 SBSQ HOSP IP/OBS MODERATE 35: CPT | Performed by: INTERNAL MEDICINE

## 2017-08-25 PROCEDURE — 88305 TISSUE EXAM BY PATHOLOGIST: CPT | Performed by: INTERNAL MEDICINE

## 2017-08-25 PROCEDURE — 25010000002 PROPOFOL 1000 MG/ML EMULSION: Performed by: NURSE ANESTHETIST, CERTIFIED REGISTERED

## 2017-08-25 PROCEDURE — G8979 MOBILITY GOAL STATUS: HCPCS

## 2017-08-25 PROCEDURE — 97163 PT EVAL HIGH COMPLEX 45 MIN: CPT

## 2017-08-25 PROCEDURE — 88342 IMHCHEM/IMCYTCHM 1ST ANTB: CPT | Performed by: INTERNAL MEDICINE

## 2017-08-25 PROCEDURE — 97167 OT EVAL HIGH COMPLEX 60 MIN: CPT

## 2017-08-25 PROCEDURE — 82962 GLUCOSE BLOOD TEST: CPT

## 2017-08-25 PROCEDURE — 80048 BASIC METABOLIC PNL TOTAL CA: CPT | Performed by: NURSE PRACTITIONER

## 2017-08-25 PROCEDURE — 85025 COMPLETE CBC W/AUTO DIFF WBC: CPT | Performed by: NURSE PRACTITIONER

## 2017-08-25 PROCEDURE — 97530 THERAPEUTIC ACTIVITIES: CPT

## 2017-08-25 PROCEDURE — 85018 HEMOGLOBIN: CPT | Performed by: INTERNAL MEDICINE

## 2017-08-25 PROCEDURE — G8978 MOBILITY CURRENT STATUS: HCPCS

## 2017-08-25 PROCEDURE — P9016 RBC LEUKOCYTES REDUCED: HCPCS

## 2017-08-25 PROCEDURE — 0DB68ZX EXCISION OF STOMACH, VIA NATURAL OR ARTIFICIAL OPENING ENDOSCOPIC, DIAGNOSTIC: ICD-10-PCS | Performed by: INTERNAL MEDICINE

## 2017-08-25 PROCEDURE — 86900 BLOOD TYPING SEROLOGIC ABO: CPT

## 2017-08-25 PROCEDURE — 85014 HEMATOCRIT: CPT | Performed by: INTERNAL MEDICINE

## 2017-08-25 PROCEDURE — 94640 AIRWAY INHALATION TREATMENT: CPT

## 2017-08-25 PROCEDURE — 94799 UNLISTED PULMONARY SVC/PX: CPT

## 2017-08-25 PROCEDURE — 97110 THERAPEUTIC EXERCISES: CPT

## 2017-08-25 RX ORDER — FAMOTIDINE 20 MG/1
20 TABLET, FILM COATED ORAL 2 TIMES DAILY
Status: DISCONTINUED | OUTPATIENT
Start: 2017-08-25 | End: 2017-08-30 | Stop reason: HOSPADM

## 2017-08-25 RX ORDER — FAMOTIDINE 20 MG/1
20 TABLET, FILM COATED ORAL ONCE
Status: DISCONTINUED | OUTPATIENT
Start: 2017-08-25 | End: 2017-08-25 | Stop reason: HOSPADM

## 2017-08-25 RX ORDER — LIDOCAINE HYDROCHLORIDE 10 MG/ML
0.5 INJECTION, SOLUTION EPIDURAL; INFILTRATION; INTRACAUDAL; PERINEURAL ONCE AS NEEDED
Status: DISCONTINUED | OUTPATIENT
Start: 2017-08-25 | End: 2017-08-25 | Stop reason: HOSPADM

## 2017-08-25 RX ORDER — PROMETHAZINE HYDROCHLORIDE 25 MG/ML
6.25 INJECTION, SOLUTION INTRAMUSCULAR; INTRAVENOUS ONCE AS NEEDED
Status: DISCONTINUED | OUTPATIENT
Start: 2017-08-25 | End: 2017-08-25 | Stop reason: HOSPADM

## 2017-08-25 RX ORDER — SODIUM CHLORIDE 0.9 % (FLUSH) 0.9 %
1-10 SYRINGE (ML) INJECTION AS NEEDED
Status: DISCONTINUED | OUTPATIENT
Start: 2017-08-25 | End: 2017-08-25 | Stop reason: HOSPADM

## 2017-08-25 RX ORDER — FENTANYL CITRATE 50 UG/ML
50 INJECTION, SOLUTION INTRAMUSCULAR; INTRAVENOUS
Status: DISCONTINUED | OUTPATIENT
Start: 2017-08-25 | End: 2017-08-25 | Stop reason: HOSPADM

## 2017-08-25 RX ORDER — SODIUM CHLORIDE, SODIUM LACTATE, POTASSIUM CHLORIDE, CALCIUM CHLORIDE 600; 310; 30; 20 MG/100ML; MG/100ML; MG/100ML; MG/100ML
9 INJECTION, SOLUTION INTRAVENOUS CONTINUOUS
Status: DISCONTINUED | OUTPATIENT
Start: 2017-08-25 | End: 2017-08-25

## 2017-08-25 RX ORDER — ONDANSETRON 2 MG/ML
4 INJECTION INTRAMUSCULAR; INTRAVENOUS ONCE AS NEEDED
Status: DISCONTINUED | OUTPATIENT
Start: 2017-08-25 | End: 2017-08-25 | Stop reason: HOSPADM

## 2017-08-25 RX ORDER — HYDROMORPHONE HYDROCHLORIDE 1 MG/ML
0.5 INJECTION, SOLUTION INTRAMUSCULAR; INTRAVENOUS; SUBCUTANEOUS
Status: DISCONTINUED | OUTPATIENT
Start: 2017-08-25 | End: 2017-08-25 | Stop reason: HOSPADM

## 2017-08-25 RX ORDER — PROMETHAZINE HYDROCHLORIDE 25 MG/1
25 TABLET ORAL ONCE AS NEEDED
Status: DISCONTINUED | OUTPATIENT
Start: 2017-08-25 | End: 2017-08-25 | Stop reason: HOSPADM

## 2017-08-25 RX ORDER — FAMOTIDINE 10 MG/ML
20 INJECTION, SOLUTION INTRAVENOUS ONCE
Status: COMPLETED | OUTPATIENT
Start: 2017-08-25 | End: 2017-08-25

## 2017-08-25 RX ORDER — PROMETHAZINE HYDROCHLORIDE 25 MG/1
25 SUPPOSITORY RECTAL ONCE AS NEEDED
Status: DISCONTINUED | OUTPATIENT
Start: 2017-08-25 | End: 2017-08-25 | Stop reason: HOSPADM

## 2017-08-25 RX ORDER — IPRATROPIUM BROMIDE AND ALBUTEROL SULFATE 2.5; .5 MG/3ML; MG/3ML
3 SOLUTION RESPIRATORY (INHALATION) ONCE AS NEEDED
Status: DISCONTINUED | OUTPATIENT
Start: 2017-08-25 | End: 2017-08-25 | Stop reason: HOSPADM

## 2017-08-25 RX ADMIN — Medication 250 MG: at 17:33

## 2017-08-25 RX ADMIN — HYDRALAZINE HYDROCHLORIDE 25 MG: 25 TABLET ORAL at 05:53

## 2017-08-25 RX ADMIN — INSULIN LISPRO 2 UNITS: 100 INJECTION, SOLUTION INTRAVENOUS; SUBCUTANEOUS at 13:20

## 2017-08-25 RX ADMIN — ISOSORBIDE MONONITRATE 60 MG: 60 TABLET, EXTENDED RELEASE ORAL at 10:34

## 2017-08-25 RX ADMIN — HYDROCODONE BITARTRATE AND ACETAMINOPHEN 1 TABLET: 5; 325 TABLET ORAL at 13:17

## 2017-08-25 RX ADMIN — FAMOTIDINE 20 MG: 10 INJECTION, SOLUTION INTRAVENOUS at 09:01

## 2017-08-25 RX ADMIN — HYDRALAZINE HYDROCHLORIDE 25 MG: 25 TABLET ORAL at 20:28

## 2017-08-25 RX ADMIN — POLYETHYLENE GLYCOL 3350 17 G: 17 POWDER, FOR SOLUTION ORAL at 10:33

## 2017-08-25 RX ADMIN — CLONAZEPAM 0.25 MG: 0.5 TABLET ORAL at 20:33

## 2017-08-25 RX ADMIN — LACTULOSE 20 G: 10 SOLUTION ORAL at 20:28

## 2017-08-25 RX ADMIN — FUROSEMIDE 40 MG: 40 TABLET ORAL at 17:33

## 2017-08-25 RX ADMIN — MULTIPLE VITAMINS W/ MINERALS TAB 1 TABLET: TAB ORAL at 10:32

## 2017-08-25 RX ADMIN — AMLODIPINE BESYLATE 5 MG: 5 TABLET ORAL at 10:33

## 2017-08-25 RX ADMIN — INSULIN LISPRO 3 UNITS: 100 INJECTION, SOLUTION INTRAVENOUS; SUBCUTANEOUS at 20:28

## 2017-08-25 RX ADMIN — BUDESONIDE AND FORMOTEROL FUMARATE DIHYDRATE 2 PUFF: 160; 4.5 AEROSOL RESPIRATORY (INHALATION) at 08:11

## 2017-08-25 RX ADMIN — BUDESONIDE AND FORMOTEROL FUMARATE DIHYDRATE 2 PUFF: 160; 4.5 AEROSOL RESPIRATORY (INHALATION) at 20:10

## 2017-08-25 RX ADMIN — FUROSEMIDE 40 MG: 40 TABLET ORAL at 10:33

## 2017-08-25 RX ADMIN — ESCITALOPRAM OXALATE 10 MG: 10 TABLET ORAL at 10:34

## 2017-08-25 RX ADMIN — HYDRALAZINE HYDROCHLORIDE 25 MG: 25 TABLET ORAL at 13:20

## 2017-08-25 RX ADMIN — INSULIN LISPRO 3 UNITS: 100 INJECTION, SOLUTION INTRAVENOUS; SUBCUTANEOUS at 17:34

## 2017-08-25 RX ADMIN — Medication 250 MG: at 10:34

## 2017-08-25 RX ADMIN — FAMOTIDINE 20 MG: 20 TABLET ORAL at 17:37

## 2017-08-25 RX ADMIN — DOCUSATE SODIUM 100 MG: 100 CAPSULE, LIQUID FILLED ORAL at 10:35

## 2017-08-25 RX ADMIN — PROPOFOL 100 MCG/KG/MIN: 10 INJECTION, EMULSION INTRAVENOUS at 09:19

## 2017-08-25 RX ADMIN — ATORVASTATIN CALCIUM 10 MG: 10 TABLET, FILM COATED ORAL at 20:28

## 2017-08-26 PROBLEM — D64.9 SEVERE ANEMIA: Status: ACTIVE | Noted: 2017-08-23

## 2017-08-26 LAB
ABO + RH BLD: NORMAL
ABO + RH BLD: NORMAL
ANION GAP SERPL CALCULATED.3IONS-SCNC: 6 MMOL/L (ref 3–11)
BH BB BLOOD EXPIRATION DATE: NORMAL
BH BB BLOOD EXPIRATION DATE: NORMAL
BH BB BLOOD TYPE BARCODE: 7300
BH BB BLOOD TYPE BARCODE: 7300
BH BB DISPENSE STATUS: NORMAL
BH BB DISPENSE STATUS: NORMAL
BH BB PRODUCT CODE: NORMAL
BH BB PRODUCT CODE: NORMAL
BH BB UNIT NUMBER: NORMAL
BH BB UNIT NUMBER: NORMAL
BUN BLD-MCNC: 63 MG/DL (ref 9–23)
BUN/CREAT SERPL: 30 (ref 7–25)
CALCIUM SPEC-SCNC: 8.7 MG/DL (ref 8.7–10.4)
CHLORIDE SERPL-SCNC: 104 MMOL/L (ref 99–109)
CO2 SERPL-SCNC: 30 MMOL/L (ref 20–31)
CREAT BLD-MCNC: 2.1 MG/DL (ref 0.6–1.3)
CROSSMATCH INTERPRETATION: NORMAL
DEPRECATED RDW RBC AUTO: 54.7 FL (ref 37–54)
ERYTHROCYTE [DISTWIDTH] IN BLOOD BY AUTOMATED COUNT: 15.9 % (ref 11.3–14.5)
GFR SERPL CREATININE-BSD FRML MDRD: 29 ML/MIN/1.73
GLUCOSE BLD-MCNC: 140 MG/DL (ref 70–100)
GLUCOSE BLDC GLUCOMTR-MCNC: 141 MG/DL (ref 70–130)
GLUCOSE BLDC GLUCOMTR-MCNC: 143 MG/DL (ref 70–130)
GLUCOSE BLDC GLUCOMTR-MCNC: 177 MG/DL (ref 70–130)
GLUCOSE BLDC GLUCOMTR-MCNC: 185 MG/DL (ref 70–130)
HCT VFR BLD AUTO: 26.9 % (ref 34.5–44)
HGB BLD-MCNC: 8 G/DL (ref 11.5–15.5)
MCH RBC QN AUTO: 27.8 PG (ref 27–31)
MCHC RBC AUTO-ENTMCNC: 29.7 G/DL (ref 32–36)
MCV RBC AUTO: 93.4 FL (ref 80–99)
PLATELET # BLD AUTO: 286 10*3/MM3 (ref 150–450)
PMV BLD AUTO: 9.6 FL (ref 6–12)
POTASSIUM BLD-SCNC: 4.4 MMOL/L (ref 3.5–5.5)
RBC # BLD AUTO: 2.88 10*6/MM3 (ref 3.89–5.14)
SODIUM BLD-SCNC: 140 MMOL/L (ref 132–146)
UNIT  ABO: NORMAL
UNIT  ABO: NORMAL
UNIT  RH: NORMAL
UNIT  RH: NORMAL
WBC NRBC COR # BLD: 7.84 10*3/MM3 (ref 3.5–10.8)

## 2017-08-26 PROCEDURE — 94640 AIRWAY INHALATION TREATMENT: CPT

## 2017-08-26 PROCEDURE — 80048 BASIC METABOLIC PNL TOTAL CA: CPT | Performed by: INTERNAL MEDICINE

## 2017-08-26 PROCEDURE — 94799 UNLISTED PULMONARY SVC/PX: CPT

## 2017-08-26 PROCEDURE — 25010000002 ONDANSETRON PER 1 MG: Performed by: INTERNAL MEDICINE

## 2017-08-26 PROCEDURE — 99231 SBSQ HOSP IP/OBS SF/LOW 25: CPT | Performed by: INTERNAL MEDICINE

## 2017-08-26 PROCEDURE — 85027 COMPLETE CBC AUTOMATED: CPT | Performed by: INTERNAL MEDICINE

## 2017-08-26 PROCEDURE — 82962 GLUCOSE BLOOD TEST: CPT

## 2017-08-26 RX ORDER — ONDANSETRON 2 MG/ML
4 INJECTION INTRAMUSCULAR; INTRAVENOUS EVERY 6 HOURS PRN
Status: DISCONTINUED | OUTPATIENT
Start: 2017-08-26 | End: 2017-08-30 | Stop reason: HOSPADM

## 2017-08-26 RX ORDER — CALCIUM CARBONATE 200(500)MG
2 TABLET,CHEWABLE ORAL 2 TIMES DAILY PRN
Status: DISCONTINUED | OUTPATIENT
Start: 2017-08-26 | End: 2017-08-30 | Stop reason: HOSPADM

## 2017-08-26 RX ADMIN — BUDESONIDE AND FORMOTEROL FUMARATE DIHYDRATE 2 PUFF: 160; 4.5 AEROSOL RESPIRATORY (INHALATION) at 19:56

## 2017-08-26 RX ADMIN — ISOSORBIDE MONONITRATE 60 MG: 60 TABLET, EXTENDED RELEASE ORAL at 09:17

## 2017-08-26 RX ADMIN — POLYETHYLENE GLYCOL 3350 17 G: 17 POWDER, FOR SOLUTION ORAL at 09:17

## 2017-08-26 RX ADMIN — HYDRALAZINE HYDROCHLORIDE 25 MG: 25 TABLET ORAL at 13:19

## 2017-08-26 RX ADMIN — HYDROCODONE BITARTRATE AND ACETAMINOPHEN 1 TABLET: 5; 325 TABLET ORAL at 13:19

## 2017-08-26 RX ADMIN — FAMOTIDINE 20 MG: 20 TABLET ORAL at 17:07

## 2017-08-26 RX ADMIN — AMLODIPINE BESYLATE 5 MG: 5 TABLET ORAL at 09:17

## 2017-08-26 RX ADMIN — ONDANSETRON 4 MG: 2 INJECTION INTRAMUSCULAR; INTRAVENOUS at 21:16

## 2017-08-26 RX ADMIN — DOCUSATE SODIUM 100 MG: 100 CAPSULE, LIQUID FILLED ORAL at 09:17

## 2017-08-26 RX ADMIN — MULTIPLE VITAMINS W/ MINERALS TAB 1 TABLET: TAB ORAL at 09:17

## 2017-08-26 RX ADMIN — FUROSEMIDE 40 MG: 40 TABLET ORAL at 09:17

## 2017-08-26 RX ADMIN — LACTULOSE 20 G: 10 SOLUTION ORAL at 20:25

## 2017-08-26 RX ADMIN — INSULIN LISPRO 2 UNITS: 100 INJECTION, SOLUTION INTRAVENOUS; SUBCUTANEOUS at 17:07

## 2017-08-26 RX ADMIN — FUROSEMIDE 40 MG: 40 TABLET ORAL at 17:07

## 2017-08-26 RX ADMIN — HYDRALAZINE HYDROCHLORIDE 25 MG: 25 TABLET ORAL at 05:45

## 2017-08-26 RX ADMIN — ESCITALOPRAM OXALATE 10 MG: 10 TABLET ORAL at 09:17

## 2017-08-26 RX ADMIN — ACETAMINOPHEN 650 MG: 325 TABLET, FILM COATED ORAL at 03:34

## 2017-08-26 RX ADMIN — Medication 250 MG: at 09:17

## 2017-08-26 RX ADMIN — FAMOTIDINE 20 MG: 20 TABLET ORAL at 09:17

## 2017-08-26 RX ADMIN — ATORVASTATIN CALCIUM 10 MG: 10 TABLET, FILM COATED ORAL at 20:25

## 2017-08-26 RX ADMIN — CLONAZEPAM 0.25 MG: 0.5 TABLET ORAL at 21:16

## 2017-08-26 RX ADMIN — Medication 250 MG: at 17:07

## 2017-08-26 RX ADMIN — BUDESONIDE AND FORMOTEROL FUMARATE DIHYDRATE 2 PUFF: 160; 4.5 AEROSOL RESPIRATORY (INHALATION) at 08:45

## 2017-08-26 RX ADMIN — INSULIN LISPRO 2 UNITS: 100 INJECTION, SOLUTION INTRAVENOUS; SUBCUTANEOUS at 13:19

## 2017-08-26 RX ADMIN — HYDRALAZINE HYDROCHLORIDE 25 MG: 25 TABLET ORAL at 21:51

## 2017-08-27 LAB
DEPRECATED RDW RBC AUTO: 54.8 FL (ref 37–54)
ERYTHROCYTE [DISTWIDTH] IN BLOOD BY AUTOMATED COUNT: 16.2 % (ref 11.3–14.5)
GLUCOSE BLDC GLUCOMTR-MCNC: 104 MG/DL (ref 70–130)
GLUCOSE BLDC GLUCOMTR-MCNC: 111 MG/DL (ref 70–130)
GLUCOSE BLDC GLUCOMTR-MCNC: 162 MG/DL (ref 70–130)
GLUCOSE BLDC GLUCOMTR-MCNC: 219 MG/DL (ref 70–130)
HCT VFR BLD AUTO: 25.4 % (ref 34.5–44)
HGB BLD-MCNC: 7.6 G/DL (ref 11.5–15.5)
MCH RBC QN AUTO: 27.9 PG (ref 27–31)
MCHC RBC AUTO-ENTMCNC: 29.9 G/DL (ref 32–36)
MCV RBC AUTO: 93.4 FL (ref 80–99)
PLATELET # BLD AUTO: 311 10*3/MM3 (ref 150–450)
PMV BLD AUTO: 9.6 FL (ref 6–12)
RBC # BLD AUTO: 2.72 10*6/MM3 (ref 3.89–5.14)
WBC NRBC COR # BLD: 9.2 10*3/MM3 (ref 3.5–10.8)

## 2017-08-27 PROCEDURE — 82962 GLUCOSE BLOOD TEST: CPT

## 2017-08-27 PROCEDURE — 94799 UNLISTED PULMONARY SVC/PX: CPT

## 2017-08-27 PROCEDURE — 85027 COMPLETE CBC AUTOMATED: CPT | Performed by: INTERNAL MEDICINE

## 2017-08-27 PROCEDURE — 99232 SBSQ HOSP IP/OBS MODERATE 35: CPT | Performed by: INTERNAL MEDICINE

## 2017-08-27 PROCEDURE — 94640 AIRWAY INHALATION TREATMENT: CPT

## 2017-08-27 PROCEDURE — 94760 N-INVAS EAR/PLS OXIMETRY 1: CPT

## 2017-08-27 PROCEDURE — 63710000001 INSULIN LISPRO (HUMAN) PER 5 UNITS: Performed by: INTERNAL MEDICINE

## 2017-08-27 PROCEDURE — 25010000002 ONDANSETRON PER 1 MG: Performed by: INTERNAL MEDICINE

## 2017-08-27 RX ADMIN — MULTIPLE VITAMINS W/ MINERALS TAB 1 TABLET: TAB ORAL at 08:37

## 2017-08-27 RX ADMIN — ATORVASTATIN CALCIUM 10 MG: 10 TABLET, FILM COATED ORAL at 21:54

## 2017-08-27 RX ADMIN — INSULIN LISPRO 4 UNITS: 100 INJECTION, SOLUTION INTRAVENOUS; SUBCUTANEOUS at 12:12

## 2017-08-27 RX ADMIN — POLYETHYLENE GLYCOL 3350 17 G: 17 POWDER, FOR SOLUTION ORAL at 08:37

## 2017-08-27 RX ADMIN — ISOSORBIDE MONONITRATE 60 MG: 60 TABLET, EXTENDED RELEASE ORAL at 08:36

## 2017-08-27 RX ADMIN — LACTULOSE 20 G: 10 SOLUTION ORAL at 21:54

## 2017-08-27 RX ADMIN — INSULIN LISPRO 4 UNITS: 100 INJECTION, SOLUTION INTRAVENOUS; SUBCUTANEOUS at 17:08

## 2017-08-27 RX ADMIN — FUROSEMIDE 40 MG: 40 TABLET ORAL at 08:36

## 2017-08-27 RX ADMIN — HYDRALAZINE HYDROCHLORIDE 25 MG: 25 TABLET ORAL at 06:31

## 2017-08-27 RX ADMIN — BUDESONIDE AND FORMOTEROL FUMARATE DIHYDRATE 2 PUFF: 160; 4.5 AEROSOL RESPIRATORY (INHALATION) at 08:23

## 2017-08-27 RX ADMIN — ESCITALOPRAM OXALATE 10 MG: 10 TABLET ORAL at 08:37

## 2017-08-27 RX ADMIN — INSULIN LISPRO 4 UNITS: 100 INJECTION, SOLUTION INTRAVENOUS; SUBCUTANEOUS at 08:36

## 2017-08-27 RX ADMIN — Medication 250 MG: at 17:03

## 2017-08-27 RX ADMIN — HYDROCODONE BITARTRATE AND ACETAMINOPHEN 1 TABLET: 5; 325 TABLET ORAL at 09:27

## 2017-08-27 RX ADMIN — DOCUSATE SODIUM 100 MG: 100 CAPSULE, LIQUID FILLED ORAL at 08:37

## 2017-08-27 RX ADMIN — POLYETHYLENE GLYCOL-3350 AND ELECTROLYTES 2000 ML: 236; 6.74; 5.86; 2.97; 22.74 POWDER, FOR SOLUTION ORAL at 17:07

## 2017-08-27 RX ADMIN — BUDESONIDE AND FORMOTEROL FUMARATE DIHYDRATE 2 PUFF: 160; 4.5 AEROSOL RESPIRATORY (INHALATION) at 18:53

## 2017-08-27 RX ADMIN — Medication 250 MG: at 08:37

## 2017-08-27 RX ADMIN — HYDRALAZINE HYDROCHLORIDE 25 MG: 25 TABLET ORAL at 14:17

## 2017-08-27 RX ADMIN — FAMOTIDINE 20 MG: 20 TABLET ORAL at 17:03

## 2017-08-27 RX ADMIN — HYDROCODONE BITARTRATE AND ACETAMINOPHEN 1 TABLET: 5; 325 TABLET ORAL at 17:03

## 2017-08-27 RX ADMIN — CLONAZEPAM 0.25 MG: 0.5 TABLET ORAL at 21:56

## 2017-08-27 RX ADMIN — FUROSEMIDE 40 MG: 40 TABLET ORAL at 17:03

## 2017-08-27 RX ADMIN — HYDRALAZINE HYDROCHLORIDE 25 MG: 25 TABLET ORAL at 21:54

## 2017-08-27 RX ADMIN — FAMOTIDINE 20 MG: 20 TABLET ORAL at 08:37

## 2017-08-27 RX ADMIN — ONDANSETRON 4 MG: 2 INJECTION INTRAMUSCULAR; INTRAVENOUS at 04:12

## 2017-08-27 RX ADMIN — AMLODIPINE BESYLATE 5 MG: 5 TABLET ORAL at 08:37

## 2017-08-28 PROBLEM — N18.9 CKD (CHRONIC KIDNEY DISEASE): Status: ACTIVE | Noted: 2017-08-28

## 2017-08-28 PROBLEM — K29.70 GASTRITIS: Status: ACTIVE | Noted: 2017-08-28

## 2017-08-28 PROBLEM — R53.83 FATIGUE: Status: ACTIVE | Noted: 2017-08-23

## 2017-08-28 LAB
DEPRECATED RDW RBC AUTO: 53.6 FL (ref 37–54)
ERYTHROCYTE [DISTWIDTH] IN BLOOD BY AUTOMATED COUNT: 15.7 % (ref 11.3–14.5)
GLUCOSE BLDC GLUCOMTR-MCNC: 112 MG/DL (ref 70–130)
GLUCOSE BLDC GLUCOMTR-MCNC: 130 MG/DL (ref 70–130)
GLUCOSE BLDC GLUCOMTR-MCNC: 169 MG/DL (ref 70–130)
GLUCOSE BLDC GLUCOMTR-MCNC: 223 MG/DL (ref 70–130)
HCT VFR BLD AUTO: 25.2 % (ref 34.5–44)
HGB BLD-MCNC: 7.6 G/DL (ref 11.5–15.5)
MCH RBC QN AUTO: 27.9 PG (ref 27–31)
MCHC RBC AUTO-ENTMCNC: 30.2 G/DL (ref 32–36)
MCV RBC AUTO: 92.6 FL (ref 80–99)
PLATELET # BLD AUTO: 310 10*3/MM3 (ref 150–450)
PMV BLD AUTO: 9.3 FL (ref 6–12)
RBC # BLD AUTO: 2.72 10*6/MM3 (ref 3.89–5.14)
VIT B12 USB SERPL-MCNC: 1737 PG/ML (ref 725–2045)
WBC NRBC COR # BLD: 8.58 10*3/MM3 (ref 3.5–10.8)

## 2017-08-28 PROCEDURE — 94760 N-INVAS EAR/PLS OXIMETRY 1: CPT

## 2017-08-28 PROCEDURE — 97110 THERAPEUTIC EXERCISES: CPT

## 2017-08-28 PROCEDURE — 99233 SBSQ HOSP IP/OBS HIGH 50: CPT | Performed by: INTERNAL MEDICINE

## 2017-08-28 PROCEDURE — 25010000002 ONDANSETRON PER 1 MG: Performed by: INTERNAL MEDICINE

## 2017-08-28 PROCEDURE — 97530 THERAPEUTIC ACTIVITIES: CPT

## 2017-08-28 PROCEDURE — 85027 COMPLETE CBC AUTOMATED: CPT | Performed by: INTERNAL MEDICINE

## 2017-08-28 PROCEDURE — 82962 GLUCOSE BLOOD TEST: CPT

## 2017-08-28 PROCEDURE — 94799 UNLISTED PULMONARY SVC/PX: CPT

## 2017-08-28 PROCEDURE — 25010000002 NA FERRIC GLUC CPLX PER 12.5 MG: Performed by: INTERNAL MEDICINE

## 2017-08-28 PROCEDURE — 94640 AIRWAY INHALATION TREATMENT: CPT

## 2017-08-28 RX ORDER — HYDRALAZINE HYDROCHLORIDE 50 MG/1
50 TABLET, FILM COATED ORAL EVERY 8 HOURS SCHEDULED
Status: DISCONTINUED | OUTPATIENT
Start: 2017-08-28 | End: 2017-08-30

## 2017-08-28 RX ORDER — LUBIPROSTONE 24 UG/1
24 CAPSULE ORAL ONCE
Status: COMPLETED | OUTPATIENT
Start: 2017-08-28 | End: 2017-08-28

## 2017-08-28 RX ORDER — MAGNESIUM CARB/ALUMINUM HYDROX 105-160MG
296 TABLET,CHEWABLE ORAL ONCE
Status: COMPLETED | OUTPATIENT
Start: 2017-08-29 | End: 2017-08-29

## 2017-08-28 RX ORDER — MAGNESIUM CARB/ALUMINUM HYDROX 105-160MG
296 TABLET,CHEWABLE ORAL ONCE
Status: COMPLETED | OUTPATIENT
Start: 2017-08-28 | End: 2017-08-28

## 2017-08-28 RX ADMIN — FUROSEMIDE 40 MG: 40 TABLET ORAL at 08:36

## 2017-08-28 RX ADMIN — Medication 296 ML: at 17:29

## 2017-08-28 RX ADMIN — SODIUM CHLORIDE 250 MG: 9 INJECTION, SOLUTION INTRAVENOUS at 13:52

## 2017-08-28 RX ADMIN — MULTIPLE VITAMINS W/ MINERALS TAB 1 TABLET: TAB ORAL at 08:36

## 2017-08-28 RX ADMIN — POLYETHYLENE GLYCOL 3350, SODIUM SULFATE ANHYDROUS, SODIUM BICARBONATE, SODIUM CHLORIDE, POTASSIUM CHLORIDE 2000 ML: 236; 22.74; 6.74; 5.86; 2.97 POWDER, FOR SOLUTION ORAL at 05:34

## 2017-08-28 RX ADMIN — FAMOTIDINE 20 MG: 20 TABLET ORAL at 17:30

## 2017-08-28 RX ADMIN — Medication 250 MG: at 17:30

## 2017-08-28 RX ADMIN — ISOSORBIDE MONONITRATE 60 MG: 60 TABLET, EXTENDED RELEASE ORAL at 08:36

## 2017-08-28 RX ADMIN — FUROSEMIDE 40 MG: 40 TABLET ORAL at 17:30

## 2017-08-28 RX ADMIN — AMLODIPINE BESYLATE 5 MG: 5 TABLET ORAL at 08:36

## 2017-08-28 RX ADMIN — BUDESONIDE AND FORMOTEROL FUMARATE DIHYDRATE 2 PUFF: 160; 4.5 AEROSOL RESPIRATORY (INHALATION) at 20:30

## 2017-08-28 RX ADMIN — ACETAMINOPHEN 650 MG: 325 TABLET, FILM COATED ORAL at 19:58

## 2017-08-28 RX ADMIN — ESCITALOPRAM OXALATE 10 MG: 10 TABLET ORAL at 08:36

## 2017-08-28 RX ADMIN — DOCUSATE SODIUM 100 MG: 100 CAPSULE, LIQUID FILLED ORAL at 08:36

## 2017-08-28 RX ADMIN — BUDESONIDE AND FORMOTEROL FUMARATE DIHYDRATE 2 PUFF: 160; 4.5 AEROSOL RESPIRATORY (INHALATION) at 08:41

## 2017-08-28 RX ADMIN — ACETAMINOPHEN 650 MG: 325 TABLET, FILM COATED ORAL at 01:15

## 2017-08-28 RX ADMIN — INSULIN LISPRO 4 UNITS: 100 INJECTION, SOLUTION INTRAVENOUS; SUBCUTANEOUS at 17:30

## 2017-08-28 RX ADMIN — HYDRALAZINE HYDROCHLORIDE 25 MG: 25 TABLET ORAL at 05:34

## 2017-08-28 RX ADMIN — FAMOTIDINE 20 MG: 20 TABLET ORAL at 08:35

## 2017-08-28 RX ADMIN — HYDROCODONE BITARTRATE AND ACETAMINOPHEN 1 TABLET: 5; 325 TABLET ORAL at 18:03

## 2017-08-28 RX ADMIN — ATORVASTATIN CALCIUM 10 MG: 10 TABLET, FILM COATED ORAL at 20:00

## 2017-08-28 RX ADMIN — POLYETHYLENE GLYCOL 3350 17 G: 17 POWDER, FOR SOLUTION ORAL at 08:35

## 2017-08-28 RX ADMIN — LUBIPROSTONE 24 MCG: 24 CAPSULE, GELATIN COATED ORAL at 17:30

## 2017-08-28 RX ADMIN — ONDANSETRON 4 MG: 2 INJECTION INTRAMUSCULAR; INTRAVENOUS at 19:58

## 2017-08-28 RX ADMIN — HYDRALAZINE HYDROCHLORIDE 50 MG: 50 TABLET ORAL at 21:55

## 2017-08-28 RX ADMIN — HYDROCODONE BITARTRATE AND ACETAMINOPHEN 1 TABLET: 5; 325 TABLET ORAL at 02:53

## 2017-08-28 RX ADMIN — CLONAZEPAM 0.25 MG: 0.5 TABLET ORAL at 21:55

## 2017-08-28 RX ADMIN — HYDRALAZINE HYDROCHLORIDE 50 MG: 50 TABLET ORAL at 13:48

## 2017-08-28 RX ADMIN — Medication 250 MG: at 08:36

## 2017-08-29 ENCOUNTER — APPOINTMENT (OUTPATIENT)
Dept: GENERAL RADIOLOGY | Facility: HOSPITAL | Age: 67
End: 2017-08-29
Attending: INTERNAL MEDICINE

## 2017-08-29 ENCOUNTER — ANESTHESIA (OUTPATIENT)
Dept: GASTROENTEROLOGY | Facility: HOSPITAL | Age: 67
End: 2017-08-29

## 2017-08-29 ENCOUNTER — ANESTHESIA EVENT (OUTPATIENT)
Dept: GASTROENTEROLOGY | Facility: HOSPITAL | Age: 67
End: 2017-08-29

## 2017-08-29 LAB
ANION GAP SERPL CALCULATED.3IONS-SCNC: 5 MMOL/L (ref 3–11)
BUN BLD-MCNC: 57 MG/DL (ref 9–23)
BUN/CREAT SERPL: 28.5 (ref 7–25)
CALCIUM SPEC-SCNC: 8.5 MG/DL (ref 8.7–10.4)
CHLORIDE SERPL-SCNC: 101 MMOL/L (ref 99–109)
CK SERPL-CCNC: 31 U/L (ref 26–174)
CO2 SERPL-SCNC: 35 MMOL/L (ref 20–31)
CREAT BLD-MCNC: 2 MG/DL (ref 0.6–1.3)
DEPRECATED RDW RBC AUTO: 54.5 FL (ref 37–54)
ERYTHROCYTE [DISTWIDTH] IN BLOOD BY AUTOMATED COUNT: 16 % (ref 11.3–14.5)
GFR SERPL CREATININE-BSD FRML MDRD: 30 ML/MIN/1.73
GLUCOSE BLD-MCNC: 95 MG/DL (ref 70–100)
GLUCOSE BLDC GLUCOMTR-MCNC: 113 MG/DL (ref 70–130)
GLUCOSE BLDC GLUCOMTR-MCNC: 165 MG/DL (ref 70–130)
GLUCOSE BLDC GLUCOMTR-MCNC: 213 MG/DL (ref 70–130)
HCT VFR BLD AUTO: 24.9 % (ref 34.5–44)
HGB BLD-MCNC: 7.8 G/DL (ref 11.5–15.5)
MCH RBC QN AUTO: 29.2 PG (ref 27–31)
MCHC RBC AUTO-ENTMCNC: 31.3 G/DL (ref 32–36)
MCV RBC AUTO: 93.3 FL (ref 80–99)
PHOSPHATE SERPL-MCNC: 4.8 MG/DL (ref 2.4–5.1)
PLATELET # BLD AUTO: 301 10*3/MM3 (ref 150–450)
PMV BLD AUTO: 9.6 FL (ref 6–12)
POTASSIUM BLD-SCNC: 4.4 MMOL/L (ref 3.5–5.5)
RBC # BLD AUTO: 2.67 10*6/MM3 (ref 3.89–5.14)
SODIUM BLD-SCNC: 141 MMOL/L (ref 132–146)
WBC NRBC COR # BLD: 7.61 10*3/MM3 (ref 3.5–10.8)

## 2017-08-29 PROCEDURE — 25010000002 PROPOFOL 10 MG/ML EMULSION: Performed by: NURSE ANESTHETIST, CERTIFIED REGISTERED

## 2017-08-29 PROCEDURE — 80048 BASIC METABOLIC PNL TOTAL CA: CPT | Performed by: INTERNAL MEDICINE

## 2017-08-29 PROCEDURE — 25010000002 NA FERRIC GLUC CPLX PER 12.5 MG: Performed by: INTERNAL MEDICINE

## 2017-08-29 PROCEDURE — 74270 X-RAY XM COLON 1CNTRST STD: CPT

## 2017-08-29 PROCEDURE — 84100 ASSAY OF PHOSPHORUS: CPT | Performed by: INTERNAL MEDICINE

## 2017-08-29 PROCEDURE — 85027 COMPLETE CBC AUTOMATED: CPT | Performed by: INTERNAL MEDICINE

## 2017-08-29 PROCEDURE — 0DJD8ZZ INSPECTION OF LOWER INTESTINAL TRACT, VIA NATURAL OR ARTIFICIAL OPENING ENDOSCOPIC: ICD-10-PCS | Performed by: INTERNAL MEDICINE

## 2017-08-29 PROCEDURE — 99232 SBSQ HOSP IP/OBS MODERATE 35: CPT | Performed by: NURSE PRACTITIONER

## 2017-08-29 PROCEDURE — 82550 ASSAY OF CK (CPK): CPT | Performed by: INTERNAL MEDICINE

## 2017-08-29 PROCEDURE — 82962 GLUCOSE BLOOD TEST: CPT

## 2017-08-29 PROCEDURE — 94799 UNLISTED PULMONARY SVC/PX: CPT

## 2017-08-29 RX ORDER — LIDOCAINE HYDROCHLORIDE 10 MG/ML
0.5 INJECTION, SOLUTION EPIDURAL; INFILTRATION; INTRACAUDAL; PERINEURAL ONCE AS NEEDED
Status: DISCONTINUED | OUTPATIENT
Start: 2017-08-29 | End: 2017-08-30 | Stop reason: HOSPADM

## 2017-08-29 RX ORDER — PROPOFOL 10 MG/ML
VIAL (ML) INTRAVENOUS CONTINUOUS PRN
Status: DISCONTINUED | OUTPATIENT
Start: 2017-08-29 | End: 2017-08-29 | Stop reason: SURG

## 2017-08-29 RX ORDER — FENTANYL CITRATE 50 UG/ML
25 INJECTION, SOLUTION INTRAMUSCULAR; INTRAVENOUS
Status: DISCONTINUED | OUTPATIENT
Start: 2017-08-29 | End: 2017-08-29 | Stop reason: HOSPADM

## 2017-08-29 RX ORDER — FAMOTIDINE 20 MG/1
20 TABLET, FILM COATED ORAL ONCE
Status: DISCONTINUED | OUTPATIENT
Start: 2017-08-29 | End: 2017-08-30 | Stop reason: HOSPADM

## 2017-08-29 RX ORDER — SODIUM CHLORIDE, SODIUM LACTATE, POTASSIUM CHLORIDE, CALCIUM CHLORIDE 600; 310; 30; 20 MG/100ML; MG/100ML; MG/100ML; MG/100ML
9 INJECTION, SOLUTION INTRAVENOUS CONTINUOUS
Status: DISCONTINUED | OUTPATIENT
Start: 2017-08-29 | End: 2017-08-30 | Stop reason: HOSPADM

## 2017-08-29 RX ORDER — LIDOCAINE HYDROCHLORIDE 10 MG/ML
INJECTION, SOLUTION INFILTRATION; PERINEURAL AS NEEDED
Status: DISCONTINUED | OUTPATIENT
Start: 2017-08-29 | End: 2017-08-29 | Stop reason: SURG

## 2017-08-29 RX ORDER — ONDANSETRON 2 MG/ML
4 INJECTION INTRAMUSCULAR; INTRAVENOUS ONCE AS NEEDED
Status: DISCONTINUED | OUTPATIENT
Start: 2017-08-29 | End: 2017-08-29 | Stop reason: HOSPADM

## 2017-08-29 RX ORDER — FAMOTIDINE 10 MG/ML
20 INJECTION, SOLUTION INTRAVENOUS ONCE
Status: DISCONTINUED | OUTPATIENT
Start: 2017-08-29 | End: 2017-08-30 | Stop reason: HOSPADM

## 2017-08-29 RX ORDER — PROPOFOL 10 MG/ML
VIAL (ML) INTRAVENOUS AS NEEDED
Status: DISCONTINUED | OUTPATIENT
Start: 2017-08-29 | End: 2017-08-29 | Stop reason: SURG

## 2017-08-29 RX ORDER — SODIUM CHLORIDE 0.9 % (FLUSH) 0.9 %
1-10 SYRINGE (ML) INJECTION AS NEEDED
Status: DISCONTINUED | OUTPATIENT
Start: 2017-08-29 | End: 2017-08-30 | Stop reason: HOSPADM

## 2017-08-29 RX ORDER — SODIUM CHLORIDE 9 MG/ML
INJECTION, SOLUTION INTRAVENOUS CONTINUOUS PRN
Status: DISCONTINUED | OUTPATIENT
Start: 2017-08-29 | End: 2017-08-29 | Stop reason: SURG

## 2017-08-29 RX ADMIN — ISOSORBIDE MONONITRATE 60 MG: 60 TABLET, EXTENDED RELEASE ORAL at 14:17

## 2017-08-29 RX ADMIN — SODIUM CHLORIDE: 9 INJECTION, SOLUTION INTRAVENOUS at 07:58

## 2017-08-29 RX ADMIN — ESCITALOPRAM OXALATE 10 MG: 10 TABLET ORAL at 14:17

## 2017-08-29 RX ADMIN — HYDRALAZINE HYDROCHLORIDE 50 MG: 50 TABLET ORAL at 05:14

## 2017-08-29 RX ADMIN — CLONAZEPAM 0.25 MG: 0.5 TABLET ORAL at 20:17

## 2017-08-29 RX ADMIN — HYDROCODONE BITARTRATE AND ACETAMINOPHEN 1 TABLET: 5; 325 TABLET ORAL at 14:17

## 2017-08-29 RX ADMIN — Medication 296 ML: at 05:15

## 2017-08-29 RX ADMIN — INSULIN LISPRO 4 UNITS: 100 INJECTION, SOLUTION INTRAVENOUS; SUBCUTANEOUS at 17:39

## 2017-08-29 RX ADMIN — HYDRALAZINE HYDROCHLORIDE 50 MG: 50 TABLET ORAL at 20:17

## 2017-08-29 RX ADMIN — PROPOFOL 140 MCG/KG/MIN: 10 INJECTION, EMULSION INTRAVENOUS at 08:04

## 2017-08-29 RX ADMIN — PROPOFOL 50 MG: 10 INJECTION, EMULSION INTRAVENOUS at 08:04

## 2017-08-29 RX ADMIN — LIDOCAINE HYDROCHLORIDE 50 MG: 10 INJECTION, SOLUTION INFILTRATION; PERINEURAL at 08:04

## 2017-08-29 RX ADMIN — SODIUM CHLORIDE 250 MG: 9 INJECTION, SOLUTION INTRAVENOUS at 14:16

## 2017-08-29 RX ADMIN — FAMOTIDINE 20 MG: 20 TABLET ORAL at 17:38

## 2017-08-29 RX ADMIN — Medication 250 MG: at 17:38

## 2017-08-29 RX ADMIN — HYDROCODONE BITARTRATE AND ACETAMINOPHEN 1 TABLET: 5; 325 TABLET ORAL at 03:10

## 2017-08-29 RX ADMIN — ATORVASTATIN CALCIUM 10 MG: 10 TABLET, FILM COATED ORAL at 20:17

## 2017-08-29 RX ADMIN — BUDESONIDE AND FORMOTEROL FUMARATE DIHYDRATE 2 PUFF: 160; 4.5 AEROSOL RESPIRATORY (INHALATION) at 20:35

## 2017-08-29 RX ADMIN — FUROSEMIDE 40 MG: 40 TABLET ORAL at 17:38

## 2017-08-29 RX ADMIN — HYDROCODONE BITARTRATE AND ACETAMINOPHEN 1 TABLET: 5; 325 TABLET ORAL at 21:34

## 2017-08-29 RX ADMIN — BARIUM SULFATE 500 ML: 1.05 SUSPENSION ORAL; RECTAL at 11:44

## 2017-08-29 RX ADMIN — HYDRALAZINE HYDROCHLORIDE 50 MG: 50 TABLET ORAL at 14:17

## 2017-08-29 NOTE — ANESTHESIA POSTPROCEDURE EVALUATION
Patient: Joy Bueno    Procedure Summary     Date Anesthesia Start Anesthesia Stop Room / Location    08/29/17 0758 0907  [facility]       Procedure Diagnosis Surgeon Provider    COLONOSCOPY (N/A ) Severe anemia  (Severe anemia [D64.9]) Mark I Brunner, MD Gregory Marta, MD          Anesthesia Type: general  Last vitals  BP        Temp        Pulse       Resp        SpO2          Post Anesthesia Care and Evaluation    Patient location during evaluation: PACU  Patient participation: complete - patient participated  Level of consciousness: awake and alert  Pain score: 0  Pain management: adequate  Airway patency: patent  Anesthetic complications: No anesthetic complications  PONV Status: none  Cardiovascular status: hemodynamically stable and acceptable  Respiratory status: nonlabored ventilation, acceptable and nasal cannula  Hydration status: acceptable    Comments: Pt transported to PACU with O2 via nasal cannula at 4 L/MIN. Vital signs stable.  Pt shows no signs of distress.  Report given to PACU RN.

## 2017-08-29 NOTE — ANESTHESIA PREPROCEDURE EVALUATION
Anesthesia Evaluation     Patient summary reviewed and Nursing notes reviewed   no history of anesthetic complications:  NPO Solid Status: > 8 hours  NPO Liquid Status: > 8 hours     Airway   Mallampati: II  TM distance: >3 FB  Neck ROM: full  no difficulty expected  Dental - normal exam     Pulmonary - normal exam    breath sounds clear to auscultation  (+) asthma,   Cardiovascular - normal exam    ECG reviewed  Rhythm: regular  Rate: normal    (+) hypertension,       Neuro/Psych- negative ROS  GI/Hepatic/Renal/Endo    (+) morbid obesity, renal disease CRI, diabetes mellitus type 2,     ROS Comment: GI bleed    Musculoskeletal     Abdominal    Substance History      OB/GYN          Other   (+) arthritis                                     Anesthesia Plan    ASA 3     general     intravenous induction   Anesthetic plan and risks discussed with patient.    Plan discussed with CRNA.

## 2017-08-30 VITALS
SYSTOLIC BLOOD PRESSURE: 178 MMHG | TEMPERATURE: 97.5 F | BODY MASS INDEX: 50.02 KG/M2 | WEIGHT: 293 LBS | HEART RATE: 83 BPM | RESPIRATION RATE: 17 BRPM | OXYGEN SATURATION: 100 % | HEIGHT: 64 IN | DIASTOLIC BLOOD PRESSURE: 93 MMHG

## 2017-08-30 LAB
ANION GAP SERPL CALCULATED.3IONS-SCNC: 6 MMOL/L (ref 3–11)
BUN BLD-MCNC: 57 MG/DL (ref 9–23)
BUN/CREAT SERPL: 25.9 (ref 7–25)
CALCIUM SPEC-SCNC: 8.6 MG/DL (ref 8.7–10.4)
CHLORIDE SERPL-SCNC: 101 MMOL/L (ref 99–109)
CO2 SERPL-SCNC: 32 MMOL/L (ref 20–31)
CREAT BLD-MCNC: 2.2 MG/DL (ref 0.6–1.3)
GFR SERPL CREATININE-BSD FRML MDRD: 27 ML/MIN/1.73
GLUCOSE BLD-MCNC: 115 MG/DL (ref 70–100)
GLUCOSE BLDC GLUCOMTR-MCNC: 132 MG/DL (ref 70–130)
GLUCOSE BLDC GLUCOMTR-MCNC: 138 MG/DL (ref 70–130)
HCT VFR BLD AUTO: 25.9 % (ref 34.5–44)
HGB BLD-MCNC: 7.9 G/DL (ref 11.5–15.5)
POTASSIUM BLD-SCNC: 4.1 MMOL/L (ref 3.5–5.5)
SODIUM BLD-SCNC: 139 MMOL/L (ref 132–146)

## 2017-08-30 PROCEDURE — 25010000002 NA FERRIC GLUC CPLX PER 12.5 MG: Performed by: INTERNAL MEDICINE

## 2017-08-30 PROCEDURE — 94640 AIRWAY INHALATION TREATMENT: CPT

## 2017-08-30 PROCEDURE — 97530 THERAPEUTIC ACTIVITIES: CPT

## 2017-08-30 PROCEDURE — 85018 HEMOGLOBIN: CPT | Performed by: INTERNAL MEDICINE

## 2017-08-30 PROCEDURE — 99239 HOSP IP/OBS DSCHRG MGMT >30: CPT | Performed by: NURSE PRACTITIONER

## 2017-08-30 PROCEDURE — 85014 HEMATOCRIT: CPT | Performed by: INTERNAL MEDICINE

## 2017-08-30 PROCEDURE — 80048 BASIC METABOLIC PNL TOTAL CA: CPT | Performed by: NURSE PRACTITIONER

## 2017-08-30 PROCEDURE — 82962 GLUCOSE BLOOD TEST: CPT

## 2017-08-30 PROCEDURE — 97110 THERAPEUTIC EXERCISES: CPT

## 2017-08-30 PROCEDURE — 94799 UNLISTED PULMONARY SVC/PX: CPT

## 2017-08-30 RX ORDER — HYDRALAZINE HYDROCHLORIDE 50 MG/1
75 TABLET, FILM COATED ORAL 3 TIMES DAILY
Start: 2017-08-30 | End: 2017-11-10 | Stop reason: HOSPADM

## 2017-08-30 RX ADMIN — Medication 250 MG: at 09:18

## 2017-08-30 RX ADMIN — ISOSORBIDE MONONITRATE 60 MG: 60 TABLET, EXTENDED RELEASE ORAL at 09:16

## 2017-08-30 RX ADMIN — MULTIPLE VITAMINS W/ MINERALS TAB 1 TABLET: TAB ORAL at 09:16

## 2017-08-30 RX ADMIN — BUDESONIDE AND FORMOTEROL FUMARATE DIHYDRATE 2 PUFF: 160; 4.5 AEROSOL RESPIRATORY (INHALATION) at 10:16

## 2017-08-30 RX ADMIN — INSULIN LISPRO 4 UNITS: 100 INJECTION, SOLUTION INTRAVENOUS; SUBCUTANEOUS at 09:20

## 2017-08-30 RX ADMIN — DOCUSATE SODIUM 100 MG: 100 CAPSULE, LIQUID FILLED ORAL at 09:18

## 2017-08-30 RX ADMIN — HYDRALAZINE HYDROCHLORIDE 75 MG: 25 TABLET ORAL at 13:49

## 2017-08-30 RX ADMIN — POLYETHYLENE GLYCOL 3350 17 G: 17 POWDER, FOR SOLUTION ORAL at 09:18

## 2017-08-30 RX ADMIN — INSULIN LISPRO 4 UNITS: 100 INJECTION, SOLUTION INTRAVENOUS; SUBCUTANEOUS at 12:16

## 2017-08-30 RX ADMIN — HYDROCODONE BITARTRATE AND ACETAMINOPHEN 1 TABLET: 5; 325 TABLET ORAL at 05:27

## 2017-08-30 RX ADMIN — AMLODIPINE BESYLATE 5 MG: 5 TABLET ORAL at 09:15

## 2017-08-30 RX ADMIN — ESCITALOPRAM OXALATE 10 MG: 10 TABLET ORAL at 09:18

## 2017-08-30 RX ADMIN — HYDRALAZINE HYDROCHLORIDE 50 MG: 50 TABLET ORAL at 05:27

## 2017-08-30 RX ADMIN — FAMOTIDINE 20 MG: 20 TABLET ORAL at 09:18

## 2017-08-30 RX ADMIN — FUROSEMIDE 40 MG: 40 TABLET ORAL at 09:15

## 2017-08-30 RX ADMIN — SODIUM CHLORIDE 250 MG: 9 INJECTION, SOLUTION INTRAVENOUS at 09:20

## 2017-09-02 ENCOUNTER — HOSPITAL ENCOUNTER (INPATIENT)
Facility: HOSPITAL | Age: 67
LOS: 5 days | Discharge: NURSING, REHAB OR LONG-TERM CARE | End: 2017-09-07
Attending: EMERGENCY MEDICINE | Admitting: INTERNAL MEDICINE

## 2017-09-02 ENCOUNTER — APPOINTMENT (OUTPATIENT)
Dept: GENERAL RADIOLOGY | Facility: HOSPITAL | Age: 67
End: 2017-09-02

## 2017-09-02 ENCOUNTER — APPOINTMENT (OUTPATIENT)
Dept: CT IMAGING | Facility: HOSPITAL | Age: 67
End: 2017-09-02

## 2017-09-02 DIAGNOSIS — R91.8 LUNG INFILTRATE: ICD-10-CM

## 2017-09-02 DIAGNOSIS — R06.00 DYSPNEA, UNSPECIFIED TYPE: ICD-10-CM

## 2017-09-02 DIAGNOSIS — J90 PLEURAL EFFUSION, RIGHT: Primary | ICD-10-CM

## 2017-09-02 DIAGNOSIS — D64.9 ANEMIA, UNSPECIFIED TYPE: ICD-10-CM

## 2017-09-02 PROBLEM — J18.9 PNEUMONIA: Status: ACTIVE | Noted: 2017-09-02

## 2017-09-02 PROBLEM — J96.21 ACUTE ON CHRONIC RESPIRATORY FAILURE WITH HYPOXIA (HCC): Status: ACTIVE | Noted: 2017-09-02

## 2017-09-02 PROBLEM — D72.829 LEUKOCYTOSIS: Status: ACTIVE | Noted: 2017-09-02

## 2017-09-02 LAB
ALBUMIN SERPL-MCNC: 3.3 G/DL (ref 3.2–4.8)
ALBUMIN/GLOB SERPL: 0.8 G/DL (ref 1.5–2.5)
ALP SERPL-CCNC: 123 U/L (ref 25–100)
ALT SERPL W P-5'-P-CCNC: 34 U/L (ref 7–40)
ANION GAP SERPL CALCULATED.3IONS-SCNC: 9 MMOL/L (ref 3–11)
AST SERPL-CCNC: 39 U/L (ref 0–33)
BASOPHILS # BLD AUTO: 0.02 10*3/MM3 (ref 0–0.2)
BASOPHILS NFR BLD AUTO: 0.2 % (ref 0–1)
BILIRUB SERPL-MCNC: 0.3 MG/DL (ref 0.3–1.2)
BNP SERPL-MCNC: 440 PG/ML (ref 0–100)
BUN BLD-MCNC: 50 MG/DL (ref 9–23)
BUN/CREAT SERPL: 23.8 (ref 7–25)
CALCIUM SPEC-SCNC: 8.9 MG/DL (ref 8.7–10.4)
CHLORIDE SERPL-SCNC: 100 MMOL/L (ref 99–109)
CO2 SERPL-SCNC: 31 MMOL/L (ref 20–31)
CREAT BLD-MCNC: 2.1 MG/DL (ref 0.6–1.3)
D-LACTATE SERPL-SCNC: 0.6 MMOL/L (ref 0.5–2)
DEPRECATED RDW RBC AUTO: 57 FL (ref 37–54)
EOSINOPHIL # BLD AUTO: 0.13 10*3/MM3 (ref 0–0.3)
EOSINOPHIL NFR BLD AUTO: 1 % (ref 0–3)
ERYTHROCYTE [DISTWIDTH] IN BLOOD BY AUTOMATED COUNT: 16.6 % (ref 11.3–14.5)
GFR SERPL CREATININE-BSD FRML MDRD: 29 ML/MIN/1.73
GLOBULIN UR ELPH-MCNC: 4.3 GM/DL
GLUCOSE BLD-MCNC: 197 MG/DL (ref 70–100)
GLUCOSE BLDC GLUCOMTR-MCNC: 173 MG/DL (ref 70–130)
HCT VFR BLD AUTO: 28.5 % (ref 34.5–44)
HGB BLD-MCNC: 8.3 G/DL (ref 11.5–15.5)
HOLD SPECIMEN: NORMAL
IMM GRANULOCYTES # BLD: 0.1 10*3/MM3 (ref 0–0.03)
IMM GRANULOCYTES NFR BLD: 0.8 % (ref 0–0.6)
LYMPHOCYTES # BLD AUTO: 1.26 10*3/MM3 (ref 0.6–4.8)
LYMPHOCYTES NFR BLD AUTO: 9.9 % (ref 24–44)
MCH RBC QN AUTO: 27.5 PG (ref 27–31)
MCHC RBC AUTO-ENTMCNC: 29.1 G/DL (ref 32–36)
MCV RBC AUTO: 94.4 FL (ref 80–99)
MONOCYTES # BLD AUTO: 0.48 10*3/MM3 (ref 0–1)
MONOCYTES NFR BLD AUTO: 3.8 % (ref 0–12)
NEUTROPHILS # BLD AUTO: 10.77 10*3/MM3 (ref 1.5–8.3)
NEUTROPHILS NFR BLD AUTO: 84.3 % (ref 41–71)
PLATELET # BLD AUTO: 315 10*3/MM3 (ref 150–450)
PMV BLD AUTO: 9.5 FL (ref 6–12)
POTASSIUM BLD-SCNC: 4.3 MMOL/L (ref 3.5–5.5)
PROT SERPL-MCNC: 7.6 G/DL (ref 5.7–8.2)
RBC # BLD AUTO: 3.02 10*6/MM3 (ref 3.89–5.14)
SODIUM BLD-SCNC: 140 MMOL/L (ref 132–146)
TROPONIN I SERPL-MCNC: 0.01 NG/ML (ref 0–0.07)
TROPONIN I SERPL-MCNC: 0.02 NG/ML (ref 0–0.07)
WBC NRBC COR # BLD: 12.76 10*3/MM3 (ref 3.5–10.8)
WHOLE BLOOD HOLD SPECIMEN: NORMAL
WHOLE BLOOD HOLD SPECIMEN: NORMAL

## 2017-09-02 PROCEDURE — 36415 COLL VENOUS BLD VENIPUNCTURE: CPT

## 2017-09-02 PROCEDURE — 25010000002 HEPARIN (PORCINE) PER 1000 UNITS: Performed by: NURSE PRACTITIONER

## 2017-09-02 PROCEDURE — 94799 UNLISTED PULMONARY SVC/PX: CPT

## 2017-09-02 PROCEDURE — 25010000002 LEVOFLOXACIN PER 250 MG: Performed by: EMERGENCY MEDICINE

## 2017-09-02 PROCEDURE — 99285 EMERGENCY DEPT VISIT HI MDM: CPT

## 2017-09-02 PROCEDURE — 94760 N-INVAS EAR/PLS OXIMETRY 1: CPT

## 2017-09-02 PROCEDURE — 25010000002 FUROSEMIDE PER 20 MG: Performed by: NURSE PRACTITIONER

## 2017-09-02 PROCEDURE — 25010000002 PIPERACILLIN-TAZOBACTAM: Performed by: EMERGENCY MEDICINE

## 2017-09-02 PROCEDURE — 82962 GLUCOSE BLOOD TEST: CPT

## 2017-09-02 PROCEDURE — 71250 CT THORAX DX C-: CPT

## 2017-09-02 PROCEDURE — 63710000001 INSULIN LISPRO (HUMAN) PER 5 UNITS: Performed by: NURSE PRACTITIONER

## 2017-09-02 PROCEDURE — 83605 ASSAY OF LACTIC ACID: CPT | Performed by: EMERGENCY MEDICINE

## 2017-09-02 PROCEDURE — 83880 ASSAY OF NATRIURETIC PEPTIDE: CPT | Performed by: EMERGENCY MEDICINE

## 2017-09-02 PROCEDURE — 84484 ASSAY OF TROPONIN QUANT: CPT

## 2017-09-02 PROCEDURE — 25010000002 FUROSEMIDE PER 20 MG: Performed by: EMERGENCY MEDICINE

## 2017-09-02 PROCEDURE — 93005 ELECTROCARDIOGRAM TRACING: CPT | Performed by: EMERGENCY MEDICINE

## 2017-09-02 PROCEDURE — 85025 COMPLETE CBC W/AUTO DIFF WBC: CPT | Performed by: EMERGENCY MEDICINE

## 2017-09-02 PROCEDURE — 87040 BLOOD CULTURE FOR BACTERIA: CPT | Performed by: EMERGENCY MEDICINE

## 2017-09-02 PROCEDURE — 94640 AIRWAY INHALATION TREATMENT: CPT

## 2017-09-02 PROCEDURE — 71010 HC CHEST PA OR AP: CPT

## 2017-09-02 PROCEDURE — 99223 1ST HOSP IP/OBS HIGH 75: CPT | Performed by: NURSE PRACTITIONER

## 2017-09-02 PROCEDURE — 80053 COMPREHEN METABOLIC PANEL: CPT | Performed by: EMERGENCY MEDICINE

## 2017-09-02 RX ORDER — FUROSEMIDE 10 MG/ML
20 INJECTION INTRAMUSCULAR; INTRAVENOUS ONCE
Status: COMPLETED | OUTPATIENT
Start: 2017-09-02 | End: 2017-09-02

## 2017-09-02 RX ORDER — SACCHAROMYCES BOULARDII 250 MG
250 CAPSULE ORAL 2 TIMES DAILY
Status: DISCONTINUED | OUTPATIENT
Start: 2017-09-02 | End: 2017-09-07 | Stop reason: HOSPADM

## 2017-09-02 RX ORDER — ATORVASTATIN CALCIUM 10 MG/1
10 TABLET, FILM COATED ORAL NIGHTLY
Status: DISCONTINUED | OUTPATIENT
Start: 2017-09-02 | End: 2017-09-07 | Stop reason: HOSPADM

## 2017-09-02 RX ORDER — HEPARIN SODIUM 5000 [USP'U]/ML
5000 INJECTION, SOLUTION INTRAVENOUS; SUBCUTANEOUS EVERY 8 HOURS SCHEDULED
Status: DISCONTINUED | OUTPATIENT
Start: 2017-09-02 | End: 2017-09-07 | Stop reason: HOSPADM

## 2017-09-02 RX ORDER — HYDROCODONE BITARTRATE AND ACETAMINOPHEN 5; 325 MG/1; MG/1
1 TABLET ORAL EVERY 4 HOURS PRN
Status: DISCONTINUED | OUTPATIENT
Start: 2017-09-02 | End: 2017-09-07 | Stop reason: HOSPADM

## 2017-09-02 RX ORDER — AMLODIPINE BESYLATE 5 MG/1
5 TABLET ORAL DAILY
Status: DISCONTINUED | OUTPATIENT
Start: 2017-09-02 | End: 2017-09-07 | Stop reason: HOSPADM

## 2017-09-02 RX ORDER — BUDESONIDE AND FORMOTEROL FUMARATE DIHYDRATE 160; 4.5 UG/1; UG/1
2 AEROSOL RESPIRATORY (INHALATION)
Status: DISCONTINUED | OUTPATIENT
Start: 2017-09-02 | End: 2017-09-07 | Stop reason: HOSPADM

## 2017-09-02 RX ORDER — DOCUSATE SODIUM 100 MG/1
100 CAPSULE, LIQUID FILLED ORAL DAILY
Status: DISCONTINUED | OUTPATIENT
Start: 2017-09-02 | End: 2017-09-07 | Stop reason: HOSPADM

## 2017-09-02 RX ORDER — LACTULOSE 10 G/15ML
20 SOLUTION ORAL NIGHTLY
Status: DISCONTINUED | OUTPATIENT
Start: 2017-09-02 | End: 2017-09-07 | Stop reason: HOSPADM

## 2017-09-02 RX ORDER — ESCITALOPRAM OXALATE 10 MG/1
10 TABLET ORAL DAILY
Status: DISCONTINUED | OUTPATIENT
Start: 2017-09-02 | End: 2017-09-07 | Stop reason: HOSPADM

## 2017-09-02 RX ORDER — FUROSEMIDE 10 MG/ML
40 INJECTION INTRAMUSCULAR; INTRAVENOUS EVERY 12 HOURS
Status: DISCONTINUED | OUTPATIENT
Start: 2017-09-02 | End: 2017-09-06

## 2017-09-02 RX ORDER — NICOTINE POLACRILEX 4 MG
15 LOZENGE BUCCAL
Status: DISCONTINUED | OUTPATIENT
Start: 2017-09-02 | End: 2017-09-07 | Stop reason: HOSPADM

## 2017-09-02 RX ORDER — ISOSORBIDE MONONITRATE 60 MG/1
60 TABLET, EXTENDED RELEASE ORAL DAILY
Status: DISCONTINUED | OUTPATIENT
Start: 2017-09-02 | End: 2017-09-07 | Stop reason: HOSPADM

## 2017-09-02 RX ORDER — LEVOFLOXACIN 5 MG/ML
750 INJECTION, SOLUTION INTRAVENOUS ONCE
Status: COMPLETED | OUTPATIENT
Start: 2017-09-02 | End: 2017-09-02

## 2017-09-02 RX ORDER — IPRATROPIUM BROMIDE AND ALBUTEROL SULFATE 2.5; .5 MG/3ML; MG/3ML
3 SOLUTION RESPIRATORY (INHALATION) ONCE
Status: COMPLETED | OUTPATIENT
Start: 2017-09-02 | End: 2017-09-02

## 2017-09-02 RX ORDER — SODIUM CHLORIDE 0.9 % (FLUSH) 0.9 %
1-10 SYRINGE (ML) INJECTION AS NEEDED
Status: DISCONTINUED | OUTPATIENT
Start: 2017-09-02 | End: 2017-09-07 | Stop reason: HOSPADM

## 2017-09-02 RX ORDER — SIMETHICONE 80 MG
80 TABLET,CHEWABLE ORAL EVERY 6 HOURS PRN
Status: DISCONTINUED | OUTPATIENT
Start: 2017-09-02 | End: 2017-09-07 | Stop reason: HOSPADM

## 2017-09-02 RX ORDER — DEXTROSE MONOHYDRATE 25 G/50ML
25 INJECTION, SOLUTION INTRAVENOUS
Status: DISCONTINUED | OUTPATIENT
Start: 2017-09-02 | End: 2017-09-07 | Stop reason: HOSPADM

## 2017-09-02 RX ORDER — LEVOFLOXACIN 5 MG/ML
750 INJECTION, SOLUTION INTRAVENOUS
Status: DISCONTINUED | OUTPATIENT
Start: 2017-09-04 | End: 2017-09-06

## 2017-09-02 RX ORDER — SODIUM CHLORIDE 0.9 % (FLUSH) 0.9 %
10 SYRINGE (ML) INJECTION AS NEEDED
Status: DISCONTINUED | OUTPATIENT
Start: 2017-09-02 | End: 2017-09-07 | Stop reason: HOSPADM

## 2017-09-02 RX ORDER — CLONAZEPAM 0.5 MG/1
0.25 TABLET ORAL 2 TIMES DAILY PRN
Status: DISCONTINUED | OUTPATIENT
Start: 2017-09-02 | End: 2017-09-07 | Stop reason: HOSPADM

## 2017-09-02 RX ADMIN — FUROSEMIDE 40 MG: 10 INJECTION, SOLUTION INTRAMUSCULAR; INTRAVENOUS at 20:32

## 2017-09-02 RX ADMIN — BUDESONIDE AND FORMOTEROL FUMARATE DIHYDRATE 2 PUFF: 160; 4.5 AEROSOL RESPIRATORY (INHALATION) at 20:20

## 2017-09-02 RX ADMIN — HEPARIN SODIUM 5000 UNITS: 5000 INJECTION, SOLUTION INTRAVENOUS; SUBCUTANEOUS at 20:30

## 2017-09-02 RX ADMIN — TAZOBACTAM SODIUM AND PIPERACILLIN SODIUM 4.5 G: .5; 4 INJECTION, POWDER, LYOPHILIZED, FOR SOLUTION INTRAVENOUS at 16:29

## 2017-09-02 RX ADMIN — AMLODIPINE BESYLATE 5 MG: 5 TABLET ORAL at 18:49

## 2017-09-02 RX ADMIN — Medication 250 MG: at 18:47

## 2017-09-02 RX ADMIN — ATORVASTATIN CALCIUM 10 MG: 10 TABLET, FILM COATED ORAL at 20:28

## 2017-09-02 RX ADMIN — LEVOFLOXACIN 750 MG: 5 INJECTION, SOLUTION INTRAVENOUS at 17:08

## 2017-09-02 RX ADMIN — ISOSORBIDE MONONITRATE 60 MG: 60 TABLET, EXTENDED RELEASE ORAL at 18:47

## 2017-09-02 RX ADMIN — HYDROCODONE BITARTRATE AND ACETAMINOPHEN 1 TABLET: 5; 325 TABLET ORAL at 22:05

## 2017-09-02 RX ADMIN — HYDRALAZINE HYDROCHLORIDE 75 MG: 25 TABLET ORAL at 20:31

## 2017-09-02 RX ADMIN — IPRATROPIUM BROMIDE AND ALBUTEROL SULFATE 3 ML: .5; 3 SOLUTION RESPIRATORY (INHALATION) at 12:26

## 2017-09-02 RX ADMIN — CLONAZEPAM 0.25 MG: 0.5 TABLET ORAL at 20:29

## 2017-09-02 RX ADMIN — FUROSEMIDE 20 MG: 10 INJECTION, SOLUTION INTRAMUSCULAR; INTRAVENOUS at 14:38

## 2017-09-02 RX ADMIN — INSULIN LISPRO 2 UNITS: 100 INJECTION, SOLUTION INTRAVENOUS; SUBCUTANEOUS at 20:46

## 2017-09-02 NOTE — PROGRESS NOTES
Pharmacy consulted to dose Zosyn for an upper respiratory tract infection.      Results from last 7 days     Lab Units 09/02/17  1027 08/30/17  0743 08/29/17  0535   CREATININE mg/dL 2.10* 2.20* 2.00*     Estimated Creatinine Clearance: 39.2 mL/min (by C-G formula based on Cr of 2.1).      Plan:    1. Will start the patient on Zosyn 2.25g IV q6h.   2. Pharmacy will continue to follow and adjust dose based on renal function, microbiology, and clinical status of the patient.    Liyah Ricks, PharmD  9/2/2017  5:32 PM

## 2017-09-02 NOTE — H&P
[facility] Medicine Services  HISTORY AND PHYSICAL    Primary Care Physician: Trevon Delarosa MD    Subjective     Chief Complaint:  SOA    History of Present Illness:   Patient is a 67-year-old -American female with past medical history of hypertension, diabetes, osteoarthritis, asthma, renal failure, and anxiety.  She's had 2 recent hospitalizations in August.  She was admitted from 8/3-8/12/17 with renal failure and status post renal biopsy.  She was discharged to rehabilitation with continued follow-up with nephrology.  During that hospitalization, offered colonoscopy for further workup of anemia and patient declined at that time.  She was also treated with Rocephin for Escherichia coli UTI.  Patient was admitted from rehabilitation on 8/23-8/30/17 with increased weakness.  During that hospitalization, patient continued to have anemia requiring blood and iron transfusion.  Patient had EGD on 8/25/17 that showed reactive gastropathy with minimal chronic gastritis.  Carlos george had a colonoscopy on 8/28 that showed scattered diverticula and grade 1 internal hemorrhoids.  Nephrology followed during both hospitalizations and patient was discharged to [facility] for rehabilitation.  Patient reports feeling well  will at rehabilitation until 0300 this morning she felt short of breath and attributed to a panic attack.  When she woke up this morning and continued to feel significantly short of breath she asked them to call EMS because she knew it wasn't a panic attack.  Patient was transported to [facility] ED.  Patient does wear home oxygen at reported 4 L although patient was unable to tell me how much oxygen she wears.  Patient also complains of productive cough with whitish, clear phlegm.  She denies chest pain, abdominal pain, or palpitations.  She reports no difficulty urinating and had BM today and denies noting any blood.  She does wear depends and is incontinent of urine.  She  reports at rehabilitation she has been able to stand only but has not been able to walk due to fatigue and weakness.  Evaluation in ED showed WBC 12.76.  Creatinine 2.10 which appears to be her baseline.  H/H was 8.3/28.5.  .  Troponin levels normal.  CT chest showed sizable right chest effusion with consolidative disease in mid and lower lung zones.  Small left base effusion.  Mild interstitial groundglass disease.  Four chamber cardiomegaly without pericardial effusion.  Patient was given IV antibiotics for possible pneumonia.  Patient will be admitted by the hospital medicine service for further evaluation and management.    Review of Systems   Constitutional: Positive for fatigue. Negative for appetite change, chills and fever.   HENT: Negative.    Eyes: Negative.    Respiratory: Positive for cough and shortness of breath. Negative for chest tightness and wheezing.    Cardiovascular: Positive for leg swelling. Negative for chest pain and palpitations.   Gastrointestinal: Negative for abdominal pain, blood in stool, constipation, diarrhea, nausea and vomiting.   Endocrine: Negative.    Genitourinary: Negative.    Musculoskeletal: Positive for gait problem (secondary to weakness).   Skin: Negative.    Neurological: Positive for weakness. Negative for dizziness and light-headedness.   Hematological: Negative.    Psychiatric/Behavioral: Negative.       Otherwise complete ROS performed and negative except as mentioned in the HPI.    Past Medical History:   Diagnosis Date   • Anemia    • Anxiety    • Asthma    • Diabetes mellitus    • Hypertension    • Morbid obesity    • Osteoarthritis        Past Surgical History:   Procedure Laterality Date   •  SECTION     • COLONOSCOPY N/A 2017    Procedure: COLONOSCOPY;  Surgeon: Mark I Brunner, MD;  Location: [facility] ENDOSCOPY;  Service:    • ENDOSCOPY N/A 2017    Procedure: ESOPHAGOGASTRODUODENOSCOPY ;  Surgeon: Velasquez Call MD;  Location: [facility]  "ENDOSCOPY;  Service:    • HERNIA REPAIR         History reviewed. No pertinent family history.    Social History     Social History   • Marital status:      Spouse name: N/A   • Number of children: N/A   • Years of education: N/A     Occupational History   • Not on file.     Social History Main Topics   • Smoking status: Former Smoker   • Smokeless tobacco: Not on file   • Alcohol use No   • Drug use: No   • Sexual activity: No     Other Topics Concern   • Not on file     Social History Narrative    lIVES ALONE- CURRENTLY IN REHAB FOR ARTHRITIS       Medications:    (Not in a hospital admission)    Allergies:  No Known Allergies      Objective     Physical Exam:  Vital Signs: /79  Pulse 87  Temp 97.5 °F (36.4 °C) (Oral)   Resp 24  Ht 65\" (165.1 cm)  Wt (!) 330 lb (150 kg)  SpO2 100%  BMI 54.91 kg/m2  Physical Exam  Gen-no acute distress  HEENT-atraumatic, normocephalic, PERRLA, EOMI, moist mucous membranes present  CV-RRR, S1 S2 normal, no m/r/g  Resp-Decreased bases anteriorly, unable to assess posteriorly due to noncompliance with patient,  normal respiratory effort, oxygen at 2 liters NC  Abd-soft, obese, NT, ND, +BS; no rebound or guarding  Ext-1-2+ edema BLE, hands puffy  Skin-warm, dry, no rashes or lesions noted  Neuro-A&Ox3, CN II-XII grossly intact; equal strength in upper and lower extremities; no focal deficits  Psych-appropriate mood and behavior      Results Reviewed:    Results from last 7 days  Lab Units 09/02/17  1027   WBC 10*3/mm3 12.76*   HEMOGLOBIN g/dL 8.3*   PLATELETS 10*3/mm3 315       Results from last 7 days  Lab Units 09/02/17  1027   SODIUM mmol/L 140   POTASSIUM mmol/L 4.3   CO2 mmol/L 31.0   CREATININE mg/dL 2.10*   GLUCOSE mg/dL 197*   CALCIUM mg/dL 8.9     Imaging Results (last 24 hours)     Procedure Component Value Units Date/Time    XR Chest 1 View [[id number]] Updated:  09/02/17 1006    CT Chest Without Contrast [[id number]] Collected:  09/02/17 1346     Updated: "  09/02/17 1403    Narrative:       EXAMINATION: CT CHEST WO CONTRAST-      INDICATION: dyspnea         TECHNIQUE:      CT data set of the chest mediastinum was performed without intravenous  contrast.     The radiation dose reduction device was turned on for each scan per the  ALARA (As Low as Reasonably Achievable) protocol.     COMPARISON: NONE     FINDINGS:   1. There is a sizable right chest effusion. There is a small left base  effusion.  2. Four-chamber cardiomegaly is noted without pericardial effusion.        3. The patient is morbidly obese. There is consolidative airspace  opacity right mid and lower lung.  4. Images into the upper abdomen are nonrevealing but distorted because  of the patient's size.  5. Extended window datasets demonstrate mild groundglass disease with  linear consolidation in both mid and lower lung zones.             Impression:          Marked obesity.     Sizable right chest effusion with consolidative disease mid and lower  lung zones. Small left base effusion. Mild interstitial groundglass  disease. Four-chamber cardiomegaly without pericardial effusion. No  definite mediastinal mass.        This report was finalized on 9/2/2017 2:01 PM by Dr. Paramjit Sibley MD.           I have personally reviewed and interpreted available lab data, radiology studies and ECG obtained at time of admission.     Assessment / Plan     Problem List:   Hospital Problem List     * (Principal)Pleural effusion, right    Leukocytosis    Possible pneumonia of RML/RLL    Acute on chronic respiratory failure with hypoxia    Diabetes mellitus    Hypertension    Morbid obesity    Anxiety    Anemia    Overview Addendum 8/28/2017 11:03 AM by Piyush Stokes MD     Iron deficient         CKD (chronic kidney disease) Stage 3    Overview Signed 8/28/2017 11:03 AM by Piyush Stokes MD     Followed by nephrology associates (improving)               Assessment:  Patient is a 67 year old  female  with several recent hospitalizations this past month for renal failure, anemia, and weakness.  She presents to [facility] ED today from [facility] with c/o SOA.  She was discharge to rehab on 8/30.      Plan:    --BC pending  --IV Lasix in ED, continue 40 mg IV BID  --strict I/O  --continue IV Zosyn and Levaquin  --Duo neb PRN  --ECHO ordered  --oxygen PRN  --FSBG w/SSI  --AM labs    DVT prophylaxis:  Heparin, SCDs/TEDs  Code Status: Full  Admission Status: Patient will be admitted to [facility].     Krystin Grace, APRN 09/02/17 3:47 PM      Brief Attending Admission Note     I have seen and examined the patient, performing an independent face-to-face diagnostic evaluation with plan of care reviewed and developed with the advanced practice clinician (APC).      Brief Summary Statement/HPI:   66 yo F with hx of HTN, DM2, CKD Stage 3 due to diabetic nephropathy, and anxiety who presents from rehab facility due to SOA and hypoxia. Recently discharged from [facility] on 8/30. She felt like she was having a panic attack this morning but continued to feel SOA prompting EMS to be called. Reportedly O2 sats were in the 70's. Reportedly wears home O2 at baseline. Complains of a cough productive of some whitish sputum. Denies any chest pain, palpitations, worsening LE edema, N/V/D, abd pain. In the ER, O2 sats recorded at 89 on RA however she was tachypneic at 33. Workup revealed a large right pleural effusion with evidence of consolidation in the mid and lower lung zones and a small left pleural effusion. WBC 12.76 and BNP elevated at 440. She was given Zosyn, Levaquin, and Lasix and admitted for further management. Patient denies any prior hx of CHF or volume overload.       Attending Physical Exam:  Constitutional:obese female in no acute distress, awake, alert  Eyes: PERRLA, sclerae anicteric, no conjunctival injection  HENT: NCAT, mucous membranes moist  Neck: supple, no thyromegaly, no lymphadenopathy, trachea midline  Respiratory:  diminished bilaterally, more so on the right side, no wheezes or rales noted, nonlabored respirations   Cardiovascular: RRR, no murmurs, rubs, or gallops, palpable pedal pulses bilaterally  Gastrointestinal: Positive bowel sounds, soft, nontender, nondistended  Musculoskeletal: 1-2+ pitting BLE edema, no clubbing or cyanosis to bilateral lower extremities  Psychiatric: oriented x 3, appropriate affect, cooperative  Neurologic: Strength symmetric in all extremities, Cranial Nerves grossly intact to confrontation, speech clear  Derm: no rashes      Brief Assessment/Plan:  See above for further detailed assessment and plan developed with APC which I have reviewed and/or edited.    66 yo F with hx of multiple recent hospitalizations due to renal failure, anemia, and weakness presents from [facility] due to SOA and hypoxia.     PLAN:  --Continue empiric ATBx for possible RML/RLL pneumonia. At higher risk due to recent hospitalizations. Follow cultures.   --Echo ordered to assess EF. Will continue on IV Lasix. Monitor I&O and daily weights.   --PT/OT consult for ongoing weakness. Will definitely need to go back to inpatient rehab at discharge.       I believe this patient meets INPATIENT status due to the need for care which can only be reasonably provided in an hospital setting such as aggressive/expedited ancillary services and/or consultation services, the necessity for IV medications, close physician monitoring and/or the possible need for procedures.  In such, I feel patient’s risk for adverse outcomes and need for care warrant INPATIENT evaluation and predict the patient’s care encounter to likely last beyond 2 midnights.      Kelli Worrell MD  09/02/17  5:31 PM

## 2017-09-02 NOTE — ED PROVIDER NOTES
Subjective   HPI Comments: Joy Bueno is a 67 y.o.female with hx of DM, HTN, obesity, renal failure, and anemia who presents to the ED with complaints of shortness of breath with onset at 0300 today. She denies fevers or any other complaints at this time. EMS reports that she is on 4L of home O2 and she is holding at 97 percent. She states that she is on breathing treatments but denies any dialysis treatments.     Patient is a 67 y.o. female presenting with shortness of breath.   History provided by:  Patient and EMS personnel  Shortness of Breath   Severity:  Moderate  Onset quality:  Sudden  Duration: Onset at 0300 today.  Timing:  Constant  Progression:  Unable to specify  Relieved by:  None tried  Worsened by:  Nothing  Ineffective treatments:  None tried  Associated symptoms: no chest pain, no fever and no wheezing    Risk factors: obesity    Risk factors comment:  DM, HTN, renal failure, and anemia      Review of Systems   Constitutional: Negative for chills and fever.   Respiratory: Positive for shortness of breath. Negative for wheezing.    Cardiovascular: Negative for chest pain.   All other systems reviewed and are negative.      Past Medical History:   Diagnosis Date   • Anemia    • Anxiety    • Asthma    • Diabetes mellitus    • Hypertension    • Morbid obesity    • Osteoarthritis        No Known Allergies    Past Surgical History:   Procedure Laterality Date   •  SECTION     • COLONOSCOPY N/A 2017    Procedure: COLONOSCOPY;  Surgeon: Mark I Brunner, MD;  Location:  [facility];  Service:    • ENDOSCOPY N/A 2017    Procedure: ESOPHAGOGASTRODUODENOSCOPY ;  Surgeon: Velasquez Call MD;  Location:  [facility];  Service:    • HERNIA REPAIR         History reviewed. No pertinent family history.    Social History     Social History   • Marital status:      Spouse name: N/A   • Number of children: N/A   • Years of education: N/A     Social History Main Topics   • Smoking  status: Former Smoker   • Smokeless tobacco: None   • Alcohol use No   • Drug use: No   • Sexual activity: No     Other Topics Concern   • None     Social History Narrative    lIVES ALONE- CURRENTLY IN REHAB FOR ARTHRITIS         Objective   Physical Exam   Constitutional: She is oriented to person, place, and time. She appears well-developed and well-nourished. No distress.   HENT:   Head: Normocephalic.   Nose: Nose normal.   Eyes: Conjunctivae are normal. No scleral icterus.   Neck: Normal range of motion. Neck supple.   Cardiovascular: Normal rate, regular rhythm and normal heart sounds.    No murmur heard.  Pulmonary/Chest: She exhibits no tenderness.   Diminished breath sounds in lower half lung field diffusely. Pt is non-compliant or unable to take deep inspirations. Mild excretory muscle use. Mostly with expiratory phase increased over inspiratory phase.    Musculoskeletal: Normal range of motion. She exhibits edema.   3+ pitting pedal and pre-tiba edema.   Neurological: She is alert and oriented to person, place, and time.   Skin: Skin is warm and dry.   Psychiatric: Her behavior is normal.   Nursing note and vitals reviewed.      Procedures         ED Course  ED Course   Comment By Time   Paged , Hospitalist for admission.-LEELA Rodrigez 09/02 1448   Spoke to Dr. Worrell, Hospitalist who agrees with plan after discussing tool telemetry.-LEELA Rodrigez 09/02 1459   Updating patient on his findings. Pt states that she is able to breath much better now.-LEELA Rodrigez 09/02 1519       Recent Results (from the past 24 hour(s))   Comprehensive Metabolic Panel    Collection Time: 09/02/17 10:27 AM   Result Value Ref Range    Glucose 197 (H) 70 - 100 mg/dL    BUN 50 (H) 9 - 23 mg/dL    Creatinine 2.10 (H) 0.60 - 1.30 mg/dL    Sodium 140 132 - 146 mmol/L    Potassium 4.3 3.5 - 5.5 mmol/L    Chloride 100 99 - 109 mmol/L    CO2 31.0 20.0 - 31.0 mmol/L    Calcium 8.9 8.7 - 10.4 mg/dL     Total Protein 7.6 5.7 - 8.2 g/dL    Albumin 3.30 3.20 - 4.80 g/dL    ALT (SGPT) 34 7 - 40 U/L    AST (SGOT) 39 (H) 0 - 33 U/L    Alkaline Phosphatase 123 (H) 25 - 100 U/L    Total Bilirubin 0.3 0.3 - 1.2 mg/dL    eGFR  African Amer 29 (L) >60 mL/min/1.73    Globulin 4.3 gm/dL    A/G Ratio 0.8 (L) 1.5 - 2.5 g/dL    BUN/Creatinine Ratio 23.8 7.0 - 25.0    Anion Gap 9.0 3.0 - 11.0 mmol/L   BNP    Collection Time: 09/02/17 10:27 AM   Result Value Ref Range    .0 (H) 0.0 - 100.0 pg/mL   Light Blue Top    Collection Time: 09/02/17 10:27 AM   Result Value Ref Range    Extra Tube hold for add-on    Green Top (Gel)    Collection Time: 09/02/17 10:27 AM   Result Value Ref Range    Extra Tube Hold for add-ons.    Lavender Top    Collection Time: 09/02/17 10:27 AM   Result Value Ref Range    Extra Tube hold for add-on    CBC Auto Differential    Collection Time: 09/02/17 10:27 AM   Result Value Ref Range    WBC 12.76 (H) 3.50 - 10.80 10*3/mm3    RBC 3.02 (L) 3.89 - 5.14 10*6/mm3    Hemoglobin 8.3 (L) 11.5 - 15.5 g/dL    Hematocrit 28.5 (L) 34.5 - 44.0 %    MCV 94.4 80.0 - 99.0 fL    MCH 27.5 27.0 - 31.0 pg    MCHC 29.1 (L) 32.0 - 36.0 g/dL    RDW 16.6 (H) 11.3 - 14.5 %    RDW-SD 57.0 (H) 37.0 - 54.0 fl    MPV 9.5 6.0 - 12.0 fL    Platelets 315 150 - 450 10*3/mm3    Neutrophil % 84.3 (H) 41.0 - 71.0 %    Lymphocyte % 9.9 (L) 24.0 - 44.0 %    Monocyte % 3.8 0.0 - 12.0 %    Eosinophil % 1.0 0.0 - 3.0 %    Basophil % 0.2 0.0 - 1.0 %    Immature Grans % 0.8 (H) 0.0 - 0.6 %    Neutrophils, Absolute 10.77 (H) 1.50 - 8.30 10*3/mm3    Lymphocytes, Absolute 1.26 0.60 - 4.80 10*3/mm3    Monocytes, Absolute 0.48 0.00 - 1.00 10*3/mm3    Eosinophils, Absolute 0.13 0.00 - 0.30 10*3/mm3    Basophils, Absolute 0.02 0.00 - 0.20 10*3/mm3    Immature Grans, Absolute 0.10 (H) 0.00 - 0.03 10*3/mm3   Lactic Acid, Plasma    Collection Time: 09/02/17 10:27 AM   Result Value Ref Range    Lactate 0.6 0.5 - 2.0 mmol/L   POC Troponin, Rapid     Collection Time: 09/02/17 10:40 AM   Result Value Ref Range    Troponin I 0.01 0.00 - 0.07 ng/mL   POC Troponin, Rapid    Collection Time: 09/02/17  1:23 PM   Result Value Ref Range    Troponin I 0.02 0.00 - 0.07 ng/mL     Note: In addition to lab results from this visit, the labs listed above may include labs taken at another facility or during a different encounter within the last 24 hours. Please correlate lab times with ED admission and discharge times for further clarification of the services performed during this visit.    CT Chest Without Contrast   Final Result       Marked obesity.       Sizable right chest effusion with consolidative disease mid and lower   lung zones. Small left base effusion. Mild interstitial groundglass   disease. Four-chamber cardiomegaly without pericardial effusion. No   definite mediastinal mass.           This report was finalized on 9/2/2017 2:01 PM by Dr. Paramjit Sibley MD.          XR Chest 1 View    (Results Pending)     Vitals:    09/02/17 1349 09/02/17 1430 09/02/17 1530 09/02/17 1550   BP:  161/79 168/80    BP Location:       Pulse:    85   Resp: 24   24   Temp:       TempSrc:       SpO2: 100% 100% 100%    Weight:       Height:         Medications   sodium chloride 0.9 % flush 10 mL (not administered)   piperacillin-tazobactam (ZOSYN) 4.5 g/100 mL 0.9% NS IVPB (mbp) (not administered)   levoFLOXacin (LEVAQUIN) 750 mg/150 mL D5W (premix) (LEVAQUIN) 750 mg (not administered)   ipratropium-albuterol (DUO-NEB) nebulizer solution 3 mL (3 mL Nebulization Given 9/2/17 1226)   furosemide (LASIX) injection 20 mg (20 mg Intravenous Given 9/2/17 1438)     ECG/EMG Results (last 24 hours)     Procedure Component Value Units Date/Time    ECG 12 Lead [[id number]] Collected:  09/02/17 0938     Updated:  09/02/17 0939    ECG 12 Lead [[id number]] Collected:  09/02/17 1318     Updated:  09/02/17 1318                    MDM    Final diagnoses:   Pleural effusion, right   Lung infiltrate    Dyspnea, unspecified type   Anemia, unspecified type       Documentation assistance provided by abelardo Mederos.  Information recorded by the abelardo was done at my direction and has been verified and validated by me.     Rosa M Rodrigez  09/02/17 1108       Rosa M Rodrigez  09/02/17 1451       Velasquez Tavarez MD  09/02/17 9411

## 2017-09-03 ENCOUNTER — APPOINTMENT (OUTPATIENT)
Dept: CARDIOLOGY | Facility: HOSPITAL | Age: 67
End: 2017-09-03

## 2017-09-03 LAB
ANION GAP SERPL CALCULATED.3IONS-SCNC: 5 MMOL/L (ref 3–11)
BASOPHILS # BLD AUTO: 0.03 10*3/MM3 (ref 0–0.2)
BASOPHILS NFR BLD AUTO: 0.3 % (ref 0–1)
BH CV ECHO MEAS - AO ROOT AREA (BSA CORRECTED): 1.3
BH CV ECHO MEAS - AO ROOT AREA: 7.5 CM^2
BH CV ECHO MEAS - AO ROOT DIAM: 3.1 CM
BH CV ECHO MEAS - BSA(HAYCOCK): 2.7 M^2
BH CV ECHO MEAS - BSA: 2.4 M^2
BH CV ECHO MEAS - BZI_BMI: 54.9 KILOGRAMS/M^2
BH CV ECHO MEAS - BZI_METRIC_HEIGHT: 165.1 CM
BH CV ECHO MEAS - BZI_METRIC_WEIGHT: 149.7 KG
BH CV ECHO MEAS - CONTRAST EF (2CH): 65.7 ML/M^2
BH CV ECHO MEAS - CONTRAST EF 4CH: 79.1 ML/M^2
BH CV ECHO MEAS - EDV(CUBED): 100.5 ML
BH CV ECHO MEAS - EDV(MOD-SP2): 99 ML
BH CV ECHO MEAS - EDV(MOD-SP4): 129 ML
BH CV ECHO MEAS - EDV(TEICH): 99.8 ML
BH CV ECHO MEAS - EF(CUBED): 65.9 %
BH CV ECHO MEAS - EF(MOD-SP2): 65.7 %
BH CV ECHO MEAS - EF(MOD-SP4): 79.1 %
BH CV ECHO MEAS - EF(TEICH): 57.4 %
BH CV ECHO MEAS - ESV(CUBED): 34.3 ML
BH CV ECHO MEAS - ESV(MOD-SP2): 34 ML
BH CV ECHO MEAS - ESV(MOD-SP4): 27 ML
BH CV ECHO MEAS - ESV(TEICH): 42.5 ML
BH CV ECHO MEAS - FS: 30.1 %
BH CV ECHO MEAS - IVS/LVPW: 0.99
BH CV ECHO MEAS - IVSD: 1.3 CM
BH CV ECHO MEAS - LA DIMENSION: 4.3 CM
BH CV ECHO MEAS - LA/AO: 1.4
BH CV ECHO MEAS - LAT PEAK E' VEL: 8.2 CM/SEC
BH CV ECHO MEAS - LV DIASTOLIC VOL/BSA (35-75): 52.7 ML/M^2
BH CV ECHO MEAS - LV MASS(C)D: 224.9 GRAMS
BH CV ECHO MEAS - LV MASS(C)DI: 91.9 GRAMS/M^2
BH CV ECHO MEAS - LV SYSTOLIC VOL/BSA (12-30): 11 ML/M^2
BH CV ECHO MEAS - LVIDD: 4.7 CM
BH CV ECHO MEAS - LVIDS: 3.3 CM
BH CV ECHO MEAS - LVLD AP2: 8 CM
BH CV ECHO MEAS - LVLD AP4: 8.2 CM
BH CV ECHO MEAS - LVLS AP2: 6.4 CM
BH CV ECHO MEAS - LVLS AP4: 6.4 CM
BH CV ECHO MEAS - LVPWD: 1.3 CM
BH CV ECHO MEAS - MED PEAK E' VEL: 9.1 CM/SEC
BH CV ECHO MEAS - MV A MAX VEL: 105 CM/SEC
BH CV ECHO MEAS - MV E MAX VEL: 121 CM/SEC
BH CV ECHO MEAS - MV E/A: 1.2
BH CV ECHO MEAS - PA ACC SLOPE: 1001 CM/SEC^2
BH CV ECHO MEAS - PA ACC TIME: 0.12 SEC
BH CV ECHO MEAS - PA PR(ACCEL): 23.7 MMHG
BH CV ECHO MEAS - RAP SYSTOLE: 3 MMHG
BH CV ECHO MEAS - RVSP: 37 MMHG
BH CV ECHO MEAS - SI(CUBED): 27.1 ML/M^2
BH CV ECHO MEAS - SI(MOD-SP2): 26.6 ML/M^2
BH CV ECHO MEAS - SI(MOD-SP4): 41.7 ML/M^2
BH CV ECHO MEAS - SI(TEICH): 23.4 ML/M^2
BH CV ECHO MEAS - SV(CUBED): 66.2 ML
BH CV ECHO MEAS - SV(MOD-SP2): 65 ML
BH CV ECHO MEAS - SV(MOD-SP4): 102 ML
BH CV ECHO MEAS - SV(TEICH): 57.3 ML
BH CV ECHO MEAS - TAPSE (>1.6): 2.9 CM2
BH CV ECHO MEAS - TR MAX VEL: 344 CM/SEC
BH CV VAS BP RIGHT ARM: NORMAL MMHG
BH CV XLRA - RV BASE: 4 CM
BH CV XLRA - RV LENGTH: 9 CM
BH CV XLRA - RV MID: 2.9 CM
BH CV XLRA - TDI S': 15.7 CM/SEC
BUN BLD-MCNC: 48 MG/DL (ref 9–23)
BUN/CREAT SERPL: 24 (ref 7–25)
CALCIUM SPEC-SCNC: 8.6 MG/DL (ref 8.7–10.4)
CHLORIDE SERPL-SCNC: 102 MMOL/L (ref 99–109)
CO2 SERPL-SCNC: 33 MMOL/L (ref 20–31)
CREAT BLD-MCNC: 2 MG/DL (ref 0.6–1.3)
DEPRECATED RDW RBC AUTO: 55.2 FL (ref 37–54)
EOSINOPHIL # BLD AUTO: 0.19 10*3/MM3 (ref 0–0.3)
EOSINOPHIL NFR BLD AUTO: 2 % (ref 0–3)
ERYTHROCYTE [DISTWIDTH] IN BLOOD BY AUTOMATED COUNT: 16.4 % (ref 11.3–14.5)
GFR SERPL CREATININE-BSD FRML MDRD: 30 ML/MIN/1.73
GLUCOSE BLD-MCNC: 107 MG/DL (ref 70–100)
GLUCOSE BLDC GLUCOMTR-MCNC: 110 MG/DL (ref 70–130)
GLUCOSE BLDC GLUCOMTR-MCNC: 134 MG/DL (ref 70–130)
GLUCOSE BLDC GLUCOMTR-MCNC: 173 MG/DL (ref 70–130)
GLUCOSE BLDC GLUCOMTR-MCNC: 201 MG/DL (ref 70–130)
HCT VFR BLD AUTO: 24.1 % (ref 34.5–44)
HGB BLD-MCNC: 7.3 G/DL (ref 11.5–15.5)
IMM GRANULOCYTES # BLD: 0.03 10*3/MM3 (ref 0–0.03)
IMM GRANULOCYTES NFR BLD: 0.3 % (ref 0–0.6)
LYMPHOCYTES # BLD AUTO: 2 10*3/MM3 (ref 0.6–4.8)
LYMPHOCYTES NFR BLD AUTO: 21.3 % (ref 24–44)
MCH RBC QN AUTO: 28.2 PG (ref 27–31)
MCHC RBC AUTO-ENTMCNC: 30.3 G/DL (ref 32–36)
MCV RBC AUTO: 93.1 FL (ref 80–99)
MONOCYTES # BLD AUTO: 0.56 10*3/MM3 (ref 0–1)
MONOCYTES NFR BLD AUTO: 6 % (ref 0–12)
NEUTROPHILS # BLD AUTO: 6.6 10*3/MM3 (ref 1.5–8.3)
NEUTROPHILS NFR BLD AUTO: 70.1 % (ref 41–71)
PLATELET # BLD AUTO: 276 10*3/MM3 (ref 150–450)
PMV BLD AUTO: 9.2 FL (ref 6–12)
POTASSIUM BLD-SCNC: 4.6 MMOL/L (ref 3.5–5.5)
RBC # BLD AUTO: 2.59 10*6/MM3 (ref 3.89–5.14)
SODIUM BLD-SCNC: 140 MMOL/L (ref 132–146)
WBC NRBC COR # BLD: 9.41 10*3/MM3 (ref 3.5–10.8)

## 2017-09-03 PROCEDURE — 82962 GLUCOSE BLOOD TEST: CPT

## 2017-09-03 PROCEDURE — 87449 NOS EACH ORGANISM AG IA: CPT | Performed by: INTERNAL MEDICINE

## 2017-09-03 PROCEDURE — C8929 TTE W OR WO FOL WCON,DOPPLER: HCPCS

## 2017-09-03 PROCEDURE — 25010000002 SULFUR HEXAFLUORIDE MICROSPH 60.7-25 MG RECONSTITUTED SUSPENSION: Performed by: INTERNAL MEDICINE

## 2017-09-03 PROCEDURE — 94640 AIRWAY INHALATION TREATMENT: CPT

## 2017-09-03 PROCEDURE — 25010000002 FUROSEMIDE PER 20 MG: Performed by: NURSE PRACTITIONER

## 2017-09-03 PROCEDURE — 94799 UNLISTED PULMONARY SVC/PX: CPT

## 2017-09-03 PROCEDURE — 25010000002 PIPERACILLIN SOD-TAZOBACTAM PER 1 G: Performed by: HOSPITALIST

## 2017-09-03 PROCEDURE — 25010000002 PIPERACILLIN-TAZOBACTAM

## 2017-09-03 PROCEDURE — 99232 SBSQ HOSP IP/OBS MODERATE 35: CPT | Performed by: INTERNAL MEDICINE

## 2017-09-03 PROCEDURE — 94760 N-INVAS EAR/PLS OXIMETRY 1: CPT

## 2017-09-03 PROCEDURE — 25010000002 HEPARIN (PORCINE) PER 1000 UNITS: Performed by: NURSE PRACTITIONER

## 2017-09-03 PROCEDURE — 85025 COMPLETE CBC W/AUTO DIFF WBC: CPT | Performed by: NURSE PRACTITIONER

## 2017-09-03 PROCEDURE — 80048 BASIC METABOLIC PNL TOTAL CA: CPT | Performed by: NURSE PRACTITIONER

## 2017-09-03 RX ORDER — IPRATROPIUM BROMIDE AND ALBUTEROL SULFATE 2.5; .5 MG/3ML; MG/3ML
3 SOLUTION RESPIRATORY (INHALATION) EVERY 4 HOURS PRN
Status: DISCONTINUED | OUTPATIENT
Start: 2017-09-03 | End: 2017-09-07 | Stop reason: HOSPADM

## 2017-09-03 RX ADMIN — SIMETHICONE CHEW TAB 80 MG 80 MG: 80 TABLET ORAL at 17:22

## 2017-09-03 RX ADMIN — TAZOBACTAM SODIUM AND PIPERACILLIN SODIUM 2.25 G: 250; 2 INJECTION, SOLUTION INTRAVENOUS at 05:05

## 2017-09-03 RX ADMIN — INSULIN LISPRO 3 UNITS: 100 INJECTION, SOLUTION INTRAVENOUS; SUBCUTANEOUS at 21:21

## 2017-09-03 RX ADMIN — HYDRALAZINE HYDROCHLORIDE 75 MG: 25 TABLET ORAL at 17:16

## 2017-09-03 RX ADMIN — HEPARIN SODIUM 5000 UNITS: 5000 INJECTION, SOLUTION INTRAVENOUS; SUBCUTANEOUS at 15:03

## 2017-09-03 RX ADMIN — BUDESONIDE AND FORMOTEROL FUMARATE DIHYDRATE 2 PUFF: 160; 4.5 AEROSOL RESPIRATORY (INHALATION) at 08:13

## 2017-09-03 RX ADMIN — FUROSEMIDE 40 MG: 10 INJECTION, SOLUTION INTRAMUSCULAR; INTRAVENOUS at 09:55

## 2017-09-03 RX ADMIN — PIPERACILLIN SODIUM,TAZOBACTAM SODIUM 3.38 G: 3; .375 INJECTION, POWDER, FOR SOLUTION INTRAVENOUS at 23:56

## 2017-09-03 RX ADMIN — INSULIN LISPRO 2 UNITS: 100 INJECTION, SOLUTION INTRAVENOUS; SUBCUTANEOUS at 12:46

## 2017-09-03 RX ADMIN — BUDESONIDE AND FORMOTEROL FUMARATE DIHYDRATE 2 PUFF: 160; 4.5 AEROSOL RESPIRATORY (INHALATION) at 19:31

## 2017-09-03 RX ADMIN — AMLODIPINE BESYLATE 5 MG: 5 TABLET ORAL at 09:55

## 2017-09-03 RX ADMIN — HYDROCODONE BITARTRATE AND ACETAMINOPHEN 1 TABLET: 5; 325 TABLET ORAL at 12:41

## 2017-09-03 RX ADMIN — DOCUSATE SODIUM 100 MG: 100 CAPSULE, LIQUID FILLED ORAL at 09:56

## 2017-09-03 RX ADMIN — HYDRALAZINE HYDROCHLORIDE 75 MG: 25 TABLET ORAL at 21:18

## 2017-09-03 RX ADMIN — TAZOBACTAM SODIUM AND PIPERACILLIN SODIUM 2.25 G: 250; 2 INJECTION, SOLUTION INTRAVENOUS at 00:04

## 2017-09-03 RX ADMIN — HEPARIN SODIUM 5000 UNITS: 5000 INJECTION, SOLUTION INTRAVENOUS; SUBCUTANEOUS at 21:17

## 2017-09-03 RX ADMIN — Medication 250 MG: at 17:16

## 2017-09-03 RX ADMIN — PIPERACILLIN SODIUM,TAZOBACTAM SODIUM 3.38 G: 3; .375 INJECTION, POWDER, FOR SOLUTION INTRAVENOUS at 17:22

## 2017-09-03 RX ADMIN — FUROSEMIDE 40 MG: 10 INJECTION, SOLUTION INTRAMUSCULAR; INTRAVENOUS at 21:18

## 2017-09-03 RX ADMIN — CLONAZEPAM 0.25 MG: 0.5 TABLET ORAL at 22:00

## 2017-09-03 RX ADMIN — IPRATROPIUM BROMIDE AND ALBUTEROL SULFATE 3 ML: .5; 3 SOLUTION RESPIRATORY (INHALATION) at 19:34

## 2017-09-03 RX ADMIN — CLONAZEPAM 0.25 MG: 0.5 TABLET ORAL at 10:13

## 2017-09-03 RX ADMIN — IPRATROPIUM BROMIDE AND ALBUTEROL SULFATE 3 ML: .5; 3 SOLUTION RESPIRATORY (INHALATION) at 14:54

## 2017-09-03 RX ADMIN — ESCITALOPRAM OXALATE 10 MG: 10 TABLET ORAL at 09:56

## 2017-09-03 RX ADMIN — PIPERACILLIN SODIUM,TAZOBACTAM SODIUM 3.38 G: 3; .375 INJECTION, POWDER, FOR SOLUTION INTRAVENOUS at 12:41

## 2017-09-03 RX ADMIN — SULFUR HEXAFLUORIDE 3 ML: KIT at 11:30

## 2017-09-03 RX ADMIN — ISOSORBIDE MONONITRATE 60 MG: 60 TABLET, EXTENDED RELEASE ORAL at 09:56

## 2017-09-03 RX ADMIN — HYDROCODONE BITARTRATE AND ACETAMINOPHEN 1 TABLET: 5; 325 TABLET ORAL at 05:05

## 2017-09-03 RX ADMIN — ATORVASTATIN CALCIUM 10 MG: 10 TABLET, FILM COATED ORAL at 21:17

## 2017-09-03 RX ADMIN — HYDRALAZINE HYDROCHLORIDE 75 MG: 25 TABLET ORAL at 09:55

## 2017-09-03 RX ADMIN — HEPARIN SODIUM 5000 UNITS: 5000 INJECTION, SOLUTION INTRAVENOUS; SUBCUTANEOUS at 05:05

## 2017-09-03 NOTE — PLAN OF CARE
Problem: Patient Care Overview (Adult)  Goal: Plan of Care Review  Outcome: Ongoing (interventions implemented as appropriate)    09/02/17 0950   Coping/Psychosocial Response Interventions   Plan Of Care Reviewed With patient   Outcome Evaluation   Outcome Summary/Follow up Plan Patient admitted to floor at 16:35 from ED.

## 2017-09-03 NOTE — PLAN OF CARE
Problem: Patient Care Overview (Adult)  Goal: Plan of Care Review  Outcome: Ongoing (interventions implemented as appropriate)    09/03/17 0408   Coping/Psychosocial Response Interventions   Plan Of Care Reviewed With patient   Outcome Evaluation   Outcome Summary/Follow up Plan VSS. pt rested well overnight. needs constant encouragement to reposition in the bed.    Patient Care Overview   Progress no change         Problem: Fall Risk (Adult)  Goal: Identify Related Risk Factors and Signs and Symptoms  Outcome: Outcome(s) achieved Date Met:  09/03/17  Goal: Absence of Falls  Outcome: Ongoing (interventions implemented as appropriate)    Problem: Respiratory Insufficiency (Adult)  Goal: Identify Related Risk Factors and Signs and Symptoms  Outcome: Outcome(s) achieved Date Met:  09/03/17  Goal: Acid/Base Balance  Outcome: Ongoing (interventions implemented as appropriate)  Goal: Effective Ventilation  Outcome: Ongoing (interventions implemented as appropriate)    Problem: Skin Integrity Impairment, Risk/Actual (Adult)  Goal: Identify Related Risk Factors and Signs and Symptoms  Outcome: Outcome(s) achieved Date Met:  09/03/17  Goal: Skin Integrity/Wound Healing  Outcome: Ongoing (interventions implemented as appropriate)

## 2017-09-04 LAB
ANION GAP SERPL CALCULATED.3IONS-SCNC: 6 MMOL/L (ref 3–11)
BASOPHILS # BLD AUTO: 0.03 10*3/MM3 (ref 0–0.2)
BASOPHILS NFR BLD AUTO: 0.3 % (ref 0–1)
BUN BLD-MCNC: 48 MG/DL (ref 9–23)
BUN/CREAT SERPL: 22.9 (ref 7–25)
CALCIUM SPEC-SCNC: 8.5 MG/DL (ref 8.7–10.4)
CHLORIDE SERPL-SCNC: 102 MMOL/L (ref 99–109)
CO2 SERPL-SCNC: 33 MMOL/L (ref 20–31)
CREAT BLD-MCNC: 2.1 MG/DL (ref 0.6–1.3)
DEPRECATED RDW RBC AUTO: 57.9 FL (ref 37–54)
EOSINOPHIL # BLD AUTO: 0.17 10*3/MM3 (ref 0–0.3)
EOSINOPHIL NFR BLD AUTO: 1.9 % (ref 0–3)
ERYTHROCYTE [DISTWIDTH] IN BLOOD BY AUTOMATED COUNT: 16.8 % (ref 11.3–14.5)
GFR SERPL CREATININE-BSD FRML MDRD: 29 ML/MIN/1.73
GLUCOSE BLD-MCNC: 115 MG/DL (ref 70–100)
GLUCOSE BLDC GLUCOMTR-MCNC: 123 MG/DL (ref 70–130)
GLUCOSE BLDC GLUCOMTR-MCNC: 134 MG/DL (ref 70–130)
GLUCOSE BLDC GLUCOMTR-MCNC: 153 MG/DL (ref 70–130)
GLUCOSE BLDC GLUCOMTR-MCNC: 171 MG/DL (ref 70–130)
HCT VFR BLD AUTO: 24.2 % (ref 34.5–44)
HGB BLD-MCNC: 7.3 G/DL (ref 11.5–15.5)
IMM GRANULOCYTES # BLD: 0.03 10*3/MM3 (ref 0–0.03)
IMM GRANULOCYTES NFR BLD: 0.3 % (ref 0–0.6)
LYMPHOCYTES # BLD AUTO: 1.66 10*3/MM3 (ref 0.6–4.8)
LYMPHOCYTES NFR BLD AUTO: 18.2 % (ref 24–44)
MAGNESIUM SERPL-MCNC: 2.7 MG/DL (ref 1.3–2.7)
MCH RBC QN AUTO: 28.5 PG (ref 27–31)
MCHC RBC AUTO-ENTMCNC: 30.2 G/DL (ref 32–36)
MCV RBC AUTO: 94.5 FL (ref 80–99)
MONOCYTES # BLD AUTO: 0.74 10*3/MM3 (ref 0–1)
MONOCYTES NFR BLD AUTO: 8.1 % (ref 0–12)
NEUTROPHILS # BLD AUTO: 6.47 10*3/MM3 (ref 1.5–8.3)
NEUTROPHILS NFR BLD AUTO: 71.2 % (ref 41–71)
PLATELET # BLD AUTO: 262 10*3/MM3 (ref 150–450)
PMV BLD AUTO: 9.3 FL (ref 6–12)
POTASSIUM BLD-SCNC: 4.4 MMOL/L (ref 3.5–5.5)
RBC # BLD AUTO: 2.56 10*6/MM3 (ref 3.89–5.14)
SODIUM BLD-SCNC: 141 MMOL/L (ref 132–146)
WBC NRBC COR # BLD: 9.1 10*3/MM3 (ref 3.5–10.8)

## 2017-09-04 PROCEDURE — 94799 UNLISTED PULMONARY SVC/PX: CPT

## 2017-09-04 PROCEDURE — 25010000002 LEVOFLOXACIN PER 250 MG: Performed by: NURSE PRACTITIONER

## 2017-09-04 PROCEDURE — 99232 SBSQ HOSP IP/OBS MODERATE 35: CPT | Performed by: INTERNAL MEDICINE

## 2017-09-04 PROCEDURE — 25010000002 HEPARIN (PORCINE) PER 1000 UNITS: Performed by: NURSE PRACTITIONER

## 2017-09-04 PROCEDURE — 94640 AIRWAY INHALATION TREATMENT: CPT

## 2017-09-04 PROCEDURE — 85025 COMPLETE CBC W/AUTO DIFF WBC: CPT | Performed by: NURSE PRACTITIONER

## 2017-09-04 PROCEDURE — 82962 GLUCOSE BLOOD TEST: CPT

## 2017-09-04 PROCEDURE — 80048 BASIC METABOLIC PNL TOTAL CA: CPT

## 2017-09-04 PROCEDURE — 25010000002 PIPERACILLIN-TAZOBACTAM

## 2017-09-04 PROCEDURE — 83735 ASSAY OF MAGNESIUM: CPT

## 2017-09-04 PROCEDURE — 25010000002 FUROSEMIDE PER 20 MG: Performed by: NURSE PRACTITIONER

## 2017-09-04 RX ORDER — ONDANSETRON 4 MG/1
4 TABLET, FILM COATED ORAL ONCE
Status: COMPLETED | OUTPATIENT
Start: 2017-09-04 | End: 2017-09-04

## 2017-09-04 RX ADMIN — HEPARIN SODIUM 5000 UNITS: 5000 INJECTION, SOLUTION INTRAVENOUS; SUBCUTANEOUS at 06:02

## 2017-09-04 RX ADMIN — INSULIN LISPRO 2 UNITS: 100 INJECTION, SOLUTION INTRAVENOUS; SUBCUTANEOUS at 18:15

## 2017-09-04 RX ADMIN — IPRATROPIUM BROMIDE AND ALBUTEROL SULFATE 3 ML: .5; 3 SOLUTION RESPIRATORY (INHALATION) at 07:46

## 2017-09-04 RX ADMIN — LEVOFLOXACIN 750 MG: 750 INJECTION, SOLUTION INTRAVENOUS at 16:12

## 2017-09-04 RX ADMIN — ATORVASTATIN CALCIUM 10 MG: 10 TABLET, FILM COATED ORAL at 21:43

## 2017-09-04 RX ADMIN — ONDANSETRON 4 MG: 4 TABLET, FILM COATED ORAL at 10:07

## 2017-09-04 RX ADMIN — ESCITALOPRAM OXALATE 10 MG: 10 TABLET ORAL at 08:45

## 2017-09-04 RX ADMIN — FUROSEMIDE 40 MG: 10 INJECTION, SOLUTION INTRAMUSCULAR; INTRAVENOUS at 08:45

## 2017-09-04 RX ADMIN — FUROSEMIDE 40 MG: 10 INJECTION, SOLUTION INTRAMUSCULAR; INTRAVENOUS at 21:43

## 2017-09-04 RX ADMIN — CLONAZEPAM 0.25 MG: 0.5 TABLET ORAL at 08:51

## 2017-09-04 RX ADMIN — DOCUSATE SODIUM 100 MG: 100 CAPSULE, LIQUID FILLED ORAL at 08:45

## 2017-09-04 RX ADMIN — PIPERACILLIN SODIUM,TAZOBACTAM SODIUM 3.38 G: 3; .375 INJECTION, POWDER, FOR SOLUTION INTRAVENOUS at 06:02

## 2017-09-04 RX ADMIN — INSULIN LISPRO 2 UNITS: 100 INJECTION, SOLUTION INTRAVENOUS; SUBCUTANEOUS at 21:47

## 2017-09-04 RX ADMIN — PIPERACILLIN SODIUM,TAZOBACTAM SODIUM 3.38 G: 3; .375 INJECTION, POWDER, FOR SOLUTION INTRAVENOUS at 12:27

## 2017-09-04 RX ADMIN — HYDROCODONE BITARTRATE AND ACETAMINOPHEN 1 TABLET: 5; 325 TABLET ORAL at 14:29

## 2017-09-04 RX ADMIN — LACTULOSE 20 G: 10 SOLUTION ORAL at 21:43

## 2017-09-04 RX ADMIN — PIPERACILLIN SODIUM,TAZOBACTAM SODIUM 3.38 G: 3; .375 INJECTION, POWDER, FOR SOLUTION INTRAVENOUS at 17:25

## 2017-09-04 RX ADMIN — HYDRALAZINE HYDROCHLORIDE 75 MG: 25 TABLET ORAL at 21:44

## 2017-09-04 RX ADMIN — SIMETHICONE CHEW TAB 80 MG 80 MG: 80 TABLET ORAL at 06:36

## 2017-09-04 RX ADMIN — HEPARIN SODIUM 5000 UNITS: 5000 INJECTION, SOLUTION INTRAVENOUS; SUBCUTANEOUS at 14:29

## 2017-09-04 RX ADMIN — HYDROCODONE BITARTRATE AND ACETAMINOPHEN 1 TABLET: 5; 325 TABLET ORAL at 22:32

## 2017-09-04 RX ADMIN — HEPARIN SODIUM 5000 UNITS: 5000 INJECTION, SOLUTION INTRAVENOUS; SUBCUTANEOUS at 21:44

## 2017-09-04 RX ADMIN — Medication 250 MG: at 17:25

## 2017-09-04 RX ADMIN — IPRATROPIUM BROMIDE AND ALBUTEROL SULFATE 3 ML: .5; 3 SOLUTION RESPIRATORY (INHALATION) at 20:45

## 2017-09-04 RX ADMIN — ISOSORBIDE MONONITRATE 60 MG: 60 TABLET, EXTENDED RELEASE ORAL at 08:45

## 2017-09-04 RX ADMIN — BUDESONIDE AND FORMOTEROL FUMARATE DIHYDRATE 2 PUFF: 160; 4.5 AEROSOL RESPIRATORY (INHALATION) at 20:45

## 2017-09-04 RX ADMIN — HYDRALAZINE HYDROCHLORIDE 75 MG: 25 TABLET ORAL at 08:45

## 2017-09-04 RX ADMIN — PIPERACILLIN SODIUM,TAZOBACTAM SODIUM 3.38 G: 3; .375 INJECTION, POWDER, FOR SOLUTION INTRAVENOUS at 23:32

## 2017-09-04 RX ADMIN — HYDROCODONE BITARTRATE AND ACETAMINOPHEN 1 TABLET: 5; 325 TABLET ORAL at 01:27

## 2017-09-04 RX ADMIN — Medication 250 MG: at 08:45

## 2017-09-04 RX ADMIN — BUDESONIDE AND FORMOTEROL FUMARATE DIHYDRATE 2 PUFF: 160; 4.5 AEROSOL RESPIRATORY (INHALATION) at 07:49

## 2017-09-04 RX ADMIN — CLONAZEPAM 0.25 MG: 0.5 TABLET ORAL at 21:44

## 2017-09-04 RX ADMIN — AMLODIPINE BESYLATE 5 MG: 5 TABLET ORAL at 08:46

## 2017-09-04 NOTE — PLAN OF CARE
Problem: Patient Care Overview (Adult)  Goal: Plan of Care Review  Outcome: Ongoing (interventions implemented as appropriate)    Problem: Fall Risk (Adult)  Goal: Absence of Falls  Outcome: Ongoing (interventions implemented as appropriate)    Problem: Respiratory Insufficiency (Adult)  Goal: Acid/Base Balance  Outcome: Ongoing (interventions implemented as appropriate)  Goal: Effective Ventilation  Outcome: Ongoing (interventions implemented as appropriate)    Problem: Skin Integrity Impairment, Risk/Actual (Adult)  Goal: Skin Integrity/Wound Healing  Outcome: Ongoing (interventions implemented as appropriate)

## 2017-09-04 NOTE — PROGRESS NOTES
[facility] Medicine Services  INPATIENT PROGRESS NOTE    Date of Admission: 9/2/2017  Length of Stay: 1  Primary Care Physician: Trevon Delarosa MD    Subjective   CC: F/U SOA  HPI:  Continues to be SOA with cough productive of clear sputum.  She states the cough is about at baseline.  States that the nebs help with SOA.      Review Of Systems:   Review of Systems  CV: No chest pain or palpitations  PULM: Persistent SOA, cough    Objective      Temp:  [97.5 °F (36.4 °C)-97.9 °F (36.6 °C)] 97.8 °F (36.6 °C)  Heart Rate:  [72-84] 84  Resp:  [18-20] 18  BP: (138-168)/(62-80) 168/80  Physical Exam  Constitutional: Morbidly obese female, no acute distress, awake, alert, laying in bed  HENT: NCAT, mucous membranes moist  Respiratory: Breathing nonlabored.  Decreased breath sounds at bases bilaterally.   Cardiovascular: RRR, no murmurs, rubs, or gallops  Gastrointestinal: Positive bowel sounds, soft, nontender, nondistended  Musculoskeletal: 2+ pitting LE edema bilaterally  Psychiatric: oriented x 3, appropriate affect, cooperative  Neurologic: Moves all extremities equally, CN grossly in tact to confrontation, speech is clear  Derm: no rashes    Results Review:    I have reviewed the labs, radiology results and diagnostic studies.      Results from last 7 days  Lab Units 09/03/17  0829   WBC 10*3/mm3 9.41   HEMOGLOBIN g/dL 7.3*   PLATELETS 10*3/mm3 276       Results from last 7 days  Lab Units 09/03/17  0829   SODIUM mmol/L 140   POTASSIUM mmol/L 4.6   CHLORIDE mmol/L 102   CO2 mmol/L 33.0*   BUN mg/dL 48*   CREATININE mg/dL 2.00*   GLUCOSE mg/dL 107*   CALCIUM mg/dL 8.6*       Culture Data: Cultures:    Blood Culture   Date Value Ref Range Status   09/02/2017 No growth at 24 hours  Preliminary   09/02/2017 No growth at 24 hours  Preliminary       Radiology Data:   Imaging Results (last 72 hours)     Procedure Component Value Units Date/Time    CT Chest Without Contrast [[id number]] Collected:   09/02/17 1346     Updated:  09/02/17 1403    Narrative:       EXAMINATION: CT CHEST WO CONTRAST-      INDICATION: dyspnea         TECHNIQUE:      CT data set of the chest mediastinum was performed without intravenous  contrast.     The radiation dose reduction device was turned on for each scan per the  ALARA (As Low as Reasonably Achievable) protocol.     COMPARISON: NONE     FINDINGS:   1. There is a sizable right chest effusion. There is a small left base  effusion.  2. Four-chamber cardiomegaly is noted without pericardial effusion.        3. The patient is morbidly obese. There is consolidative airspace  opacity right mid and lower lung.  4. Images into the upper abdomen are nonrevealing but distorted because  of the patient's size.  5. Extended window datasets demonstrate mild groundglass disease with  linear consolidation in both mid and lower lung zones.             Impression:          Marked obesity.     Sizable right chest effusion with consolidative disease mid and lower  lung zones. Small left base effusion. Mild interstitial groundglass  disease. Four-chamber cardiomegaly without pericardial effusion. No  definite mediastinal mass.        This report was finalized on 9/2/2017 2:01 PM by Dr. Paramjit Sibley MD.       XR Chest 1 View [[id number]] Collected:  09/02/17 1845     Updated:  09/02/17 1851    Narrative:       EXAMINATION: XR CHEST, SINGLE VIEW - 09/02/2017     INDICATION: Shortness of air.     COMPARISON: None.     FINDINGS:   1. There is cardiomegaly with diffuse vascular congestive edema  throughout both lungs. Mild coalescent opacities are seen at the bases  with trace effusions.           Impression:       Radiographic findings suggest cardiac failure, pulmonary  vascular and interstitial edema and progressive findings when compared  to 08/03/2017.     DICTATED:     09/02/2017  EDITED:         09/02/2017     This report was finalized on 9/2/2017 6:49 PM by Dr. Paramjit Sibley MD.              I have reviewed the medications.    Assessment/Plan     Problem List  Hospital Problem List     * (Principal)Pleural effusion, right    Diabetes mellitus    Hypertension    Morbid obesity    Anxiety    Anemia    Overview Addendum 8/28/2017 11:03 AM by Piyush Stokes MD     Iron deficient         CKD (chronic kidney disease) Stage 3    Overview Signed 8/28/2017 11:03 AM by Piyush Stokes MD     Followed by nephrology associates (improving)         Leukocytosis    Possible pneumonia of RML/RLL    Acute on chronic respiratory failure with hypoxia           Assessment/Plan:    1. Acute on chronic hypoxic respiratory failure, right pleural effusion  -CHF vs. Pneumonia  -CT chest shows RML/RLL consolidation along with elevated WBC at presentation is suggestive of pneumonia, however four-chamber cardiac dilation, vascular congestion on X-ray and peripheral edema favor a cardiogenic contribution  -Echocardiogram with normal LVEF, LVH  -Continue diuresis today with I/O monitoring, daily weights and consider repeat CXR after adequate trial of diuresis to re-evaluate effusion size  -Continue workup/treatment of pneumonia with Zosyn and levofloxacin to cover healthcare-acquired organisms.  Sputum culture, blood culture, S. pneumo and legionella urinary antigens pending  -May need diagnostic/therapeutic thoracentesis if effusion persists despite above    2. CKD III  -Monitor renal function with diuresis    3. DM2  -SSI    DVT prophylaxis: SQ heparin  Discharge Planning: I expect patient to be discharged TBD.    Liyah Chen MD   09/03/17   10:03 PM

## 2017-09-04 NOTE — PLAN OF CARE
"Problem: Patient Care Overview (Adult)  Goal: Plan of Care Review  Outcome: Ongoing (interventions implemented as appropriate)    09/04/17 0318   Coping/Psychosocial Response Interventions   Plan Of Care Reviewed With patient   Outcome Evaluation   Outcome Summary/Follow up Plan VSS. pt stated \"feels better\" still weak. bariatric bed ordered.   Patient Care Overview   Progress improving         Problem: Fall Risk (Adult)  Goal: Absence of Falls  Outcome: Ongoing (interventions implemented as appropriate)    Problem: Respiratory Insufficiency (Adult)  Goal: Acid/Base Balance  Outcome: Ongoing (interventions implemented as appropriate)  Goal: Effective Ventilation  Outcome: Ongoing (interventions implemented as appropriate)    Problem: Skin Integrity Impairment, Risk/Actual (Adult)  Goal: Skin Integrity/Wound Healing  Outcome: Ongoing (interventions implemented as appropriate)      "

## 2017-09-04 NOTE — PROGRESS NOTES
Discharge Planning Assessment  [facility]     Patient Name: Joy Bueno  MRN: [id number]  Today's Date: 9/4/2017    Admit Date: 9/2/2017          Discharge Needs Assessment       09/04/17 1430    Living Environment    Lives With facility resident    Living Arrangements residential facility    Transportation Available ambulance    Living Environment    Provides Primary Care For no one    Primary Care Provided By other (see comments)   facility    Quality Of Family Relationships supportive    Able to Return to Prior Living Arrangements yes    Discharge Needs Assessment    Concerns To Be Addressed denies needs/concerns at this time    Readmission Within The Last 30 Days current reason for admission unrelated to previous admission    Outpatient/Agency/Support Group Needs skilled nursing facility (specify)    Community Agency Name(S) [facility]    Anticipated Changes Related to Illness none    Equipment Currently Used at Home walker, rolling;glucometer;oxygen    Equipment Needed After Discharge none    Discharge Facility/Level Of Care Needs nursing facility, skilled    Discharge Disposition skilled nursing facility    Discharge Contact Information if Applicable [phone]    Discharge Planning Comments Back to [facility]            Discharge Plan       09/04/17 1432    Case Management/Social Work Plan    Plan Back to [facility]    Patient/Family In Agreement With Plan yes    Additional Comments CM spoke with Ms. Bueno at bedside. She states she has been at [facility] and she wishes to return there upon being discharged from the Hospital. Ms. Bueno is fairly dependent in her mobility and ADL's. She requires continuous oxygen and she has recently only been able to transport via EMS. CM to contact [facility] on 9/5 to confirm bed and ability to return to facility upon medical readiness of discharge.         Discharge Placement     No information found                Demographic Summary       09/04/17  1427    Referral Information    Admission Type inpatient    Arrived From nursing facility    Referral Source admission list    Record Reviewed clinical discipline documentation;history and physical;medical record    Contact Information    Permission Granted to Share Information With     Primary Care Physician Information    Name sangita haro            Functional Status       09/04/17 1428    Functional Status Current    Ambulation 3-->assistive equipment and person    Transferring 3-->assistive equipment and person    Toileting 3-->assistive equipment and person    Bathing 4-->completely dependent    Dressing 4-->completely dependent    Eating 0-->independent    Communication 0-->understands/communicates without difficulty    Swallowing (if score 2 or more for any item, consult Rehab Services) 0-->swallows foods/liquids without difficulty    Change in Functional Status Since Onset of Current Illness/Injury yes    Functional Status Prior    Ambulation 3-->assistive equipment and person    Transferring 3-->assistive equipment and person    Toileting 3-->assistive equipment and person    Bathing 3-->assistive equipment and person    Dressing 3-->assistive equipment and person    Eating 0-->independent    Communication 0-->understands/communicates without difficulty    Swallowing 0-->swallows foods/liquids without difficulty    IADL    Medications assistive person    Meal Preparation assistive person    Housekeeping assistive person    Laundry assistive person    Shopping assistive person    Oral Care assistive person    Activity Tolerance    Current Activity Limitations none    Usual Activity Tolerance poor    Current Activity Tolerance poor    Cognitive/Perceptual/Developmental    Current Mental Status/Cognitive Functioning no deficits noted    Recent Changes in Mental Status/Cognitive Functioning no changes    Employment/Financial    Financial Concerns none   has medicare a/b and aetna insurance with  no recent changes to coverage            Psychosocial     None            Abuse/Neglect     None            Legal     None            Substance Abuse     None            Patient Forms     None          Karissa Trent RN

## 2017-09-05 ENCOUNTER — APPOINTMENT (OUTPATIENT)
Dept: GENERAL RADIOLOGY | Facility: HOSPITAL | Age: 67
End: 2017-09-05

## 2017-09-05 LAB
ANION GAP SERPL CALCULATED.3IONS-SCNC: 8 MMOL/L (ref 3–11)
BUN BLD-MCNC: 51 MG/DL (ref 9–23)
BUN/CREAT SERPL: 24.3 (ref 7–25)
CALCIUM SPEC-SCNC: 8.5 MG/DL (ref 8.7–10.4)
CHLORIDE SERPL-SCNC: 100 MMOL/L (ref 99–109)
CO2 SERPL-SCNC: 32 MMOL/L (ref 20–31)
CREAT BLD-MCNC: 2.1 MG/DL (ref 0.6–1.3)
GFR SERPL CREATININE-BSD FRML MDRD: 29 ML/MIN/1.73
GLUCOSE BLD-MCNC: 87 MG/DL (ref 70–100)
GLUCOSE BLDC GLUCOMTR-MCNC: 103 MG/DL (ref 70–130)
GLUCOSE BLDC GLUCOMTR-MCNC: 108 MG/DL (ref 70–130)
GLUCOSE BLDC GLUCOMTR-MCNC: 110 MG/DL (ref 70–130)
GLUCOSE BLDC GLUCOMTR-MCNC: 142 MG/DL (ref 70–130)
POTASSIUM BLD-SCNC: 4.2 MMOL/L (ref 3.5–5.5)
SODIUM BLD-SCNC: 140 MMOL/L (ref 132–146)

## 2017-09-05 PROCEDURE — 94799 UNLISTED PULMONARY SVC/PX: CPT

## 2017-09-05 PROCEDURE — 25010000002 PIPERACILLIN-TAZOBACTAM

## 2017-09-05 PROCEDURE — 94640 AIRWAY INHALATION TREATMENT: CPT

## 2017-09-05 PROCEDURE — 82962 GLUCOSE BLOOD TEST: CPT

## 2017-09-05 PROCEDURE — 94760 N-INVAS EAR/PLS OXIMETRY 1: CPT

## 2017-09-05 PROCEDURE — 25010000002 HEPARIN (PORCINE) PER 1000 UNITS: Performed by: NURSE PRACTITIONER

## 2017-09-05 PROCEDURE — 99232 SBSQ HOSP IP/OBS MODERATE 35: CPT | Performed by: INTERNAL MEDICINE

## 2017-09-05 PROCEDURE — 71010 HC CHEST PA OR AP: CPT

## 2017-09-05 PROCEDURE — 80048 BASIC METABOLIC PNL TOTAL CA: CPT | Performed by: INTERNAL MEDICINE

## 2017-09-05 PROCEDURE — 25010000002 FUROSEMIDE PER 20 MG: Performed by: NURSE PRACTITIONER

## 2017-09-05 RX ORDER — POLYETHYLENE GLYCOL 3350 17 G/17G
17 POWDER, FOR SOLUTION ORAL DAILY
Status: DISCONTINUED | OUTPATIENT
Start: 2017-09-05 | End: 2017-09-07 | Stop reason: HOSPADM

## 2017-09-05 RX ADMIN — HYDROCODONE BITARTRATE AND ACETAMINOPHEN 1 TABLET: 5; 325 TABLET ORAL at 17:14

## 2017-09-05 RX ADMIN — HEPARIN SODIUM 5000 UNITS: 5000 INJECTION, SOLUTION INTRAVENOUS; SUBCUTANEOUS at 20:01

## 2017-09-05 RX ADMIN — CLONAZEPAM 0.25 MG: 0.5 TABLET ORAL at 08:13

## 2017-09-05 RX ADMIN — HYDROCODONE BITARTRATE AND ACETAMINOPHEN 1 TABLET: 5; 325 TABLET ORAL at 07:50

## 2017-09-05 RX ADMIN — BUDESONIDE AND FORMOTEROL FUMARATE DIHYDRATE 2 PUFF: 160; 4.5 AEROSOL RESPIRATORY (INHALATION) at 20:40

## 2017-09-05 RX ADMIN — PIPERACILLIN SODIUM,TAZOBACTAM SODIUM 3.38 G: 3; .375 INJECTION, POWDER, FOR SOLUTION INTRAVENOUS at 06:02

## 2017-09-05 RX ADMIN — AMLODIPINE BESYLATE 5 MG: 5 TABLET ORAL at 08:13

## 2017-09-05 RX ADMIN — FUROSEMIDE 40 MG: 10 INJECTION, SOLUTION INTRAMUSCULAR; INTRAVENOUS at 20:01

## 2017-09-05 RX ADMIN — HEPARIN SODIUM 5000 UNITS: 5000 INJECTION, SOLUTION INTRAVENOUS; SUBCUTANEOUS at 06:02

## 2017-09-05 RX ADMIN — CLONAZEPAM 0.25 MG: 0.5 TABLET ORAL at 20:02

## 2017-09-05 RX ADMIN — BUDESONIDE AND FORMOTEROL FUMARATE DIHYDRATE 2 PUFF: 160; 4.5 AEROSOL RESPIRATORY (INHALATION) at 08:41

## 2017-09-05 RX ADMIN — Medication 250 MG: at 17:14

## 2017-09-05 RX ADMIN — HYDRALAZINE HYDROCHLORIDE 75 MG: 25 TABLET ORAL at 17:14

## 2017-09-05 RX ADMIN — HYDRALAZINE HYDROCHLORIDE 75 MG: 25 TABLET ORAL at 08:13

## 2017-09-05 RX ADMIN — HYDROCODONE BITARTRATE AND ACETAMINOPHEN 1 TABLET: 5; 325 TABLET ORAL at 12:53

## 2017-09-05 RX ADMIN — IPRATROPIUM BROMIDE AND ALBUTEROL SULFATE 3 ML: .5; 3 SOLUTION RESPIRATORY (INHALATION) at 20:40

## 2017-09-05 RX ADMIN — DOCUSATE SODIUM 100 MG: 100 CAPSULE, LIQUID FILLED ORAL at 08:13

## 2017-09-05 RX ADMIN — LACTULOSE 20 G: 10 SOLUTION ORAL at 20:09

## 2017-09-05 RX ADMIN — HYDROCODONE BITARTRATE AND ACETAMINOPHEN 1 TABLET: 5; 325 TABLET ORAL at 21:29

## 2017-09-05 RX ADMIN — ATORVASTATIN CALCIUM 10 MG: 10 TABLET, FILM COATED ORAL at 20:02

## 2017-09-05 RX ADMIN — SIMETHICONE CHEW TAB 80 MG 80 MG: 80 TABLET ORAL at 19:44

## 2017-09-05 RX ADMIN — POLYETHYLENE GLYCOL 3350 17 G: 17 POWDER, FOR SOLUTION ORAL at 17:15

## 2017-09-05 RX ADMIN — ISOSORBIDE MONONITRATE 60 MG: 60 TABLET, EXTENDED RELEASE ORAL at 08:13

## 2017-09-05 RX ADMIN — HYDRALAZINE HYDROCHLORIDE 75 MG: 25 TABLET ORAL at 20:01

## 2017-09-05 RX ADMIN — FUROSEMIDE 40 MG: 10 INJECTION, SOLUTION INTRAMUSCULAR; INTRAVENOUS at 08:12

## 2017-09-05 RX ADMIN — SIMETHICONE CHEW TAB 80 MG 80 MG: 80 TABLET ORAL at 04:39

## 2017-09-05 RX ADMIN — IPRATROPIUM BROMIDE AND ALBUTEROL SULFATE 3 ML: .5; 3 SOLUTION RESPIRATORY (INHALATION) at 08:42

## 2017-09-05 RX ADMIN — SIMETHICONE CHEW TAB 80 MG 80 MG: 80 TABLET ORAL at 08:16

## 2017-09-05 RX ADMIN — PIPERACILLIN SODIUM,TAZOBACTAM SODIUM 3.38 G: 3; .375 INJECTION, POWDER, FOR SOLUTION INTRAVENOUS at 11:55

## 2017-09-05 RX ADMIN — ESCITALOPRAM OXALATE 10 MG: 10 TABLET ORAL at 08:13

## 2017-09-05 RX ADMIN — HEPARIN SODIUM 5000 UNITS: 5000 INJECTION, SOLUTION INTRAVENOUS; SUBCUTANEOUS at 14:21

## 2017-09-05 RX ADMIN — Medication 250 MG: at 08:13

## 2017-09-05 NOTE — PLAN OF CARE
Problem: Patient Care Overview (Adult)  Goal: Plan of Care Review  Outcome: Ongoing (interventions implemented as appropriate)  Goal: Adult Individualization and Mutuality  Outcome: Ongoing (interventions implemented as appropriate)    Problem: Fall Risk (Adult)  Goal: Absence of Falls  Outcome: Ongoing (interventions implemented as appropriate)    Problem: Respiratory Insufficiency (Adult)  Goal: Acid/Base Balance  Outcome: Ongoing (interventions implemented as appropriate)  Goal: Effective Ventilation  Outcome: Ongoing (interventions implemented as appropriate)    Problem: Skin Integrity Impairment, Risk/Actual (Adult)  Goal: Skin Integrity/Wound Healing  Outcome: Ongoing (interventions implemented as appropriate)

## 2017-09-05 NOTE — PROGRESS NOTES
[facility] Medicine Services  INPATIENT PROGRESS NOTE    Date of Admission: 9/2/2017  Length of Stay: 2  Primary Care Physician: Trevon Delarosa MD    Subjective   CC: F/U SOA  HPI:  SOA has improved today as has lower extremity edema.  She is feeling a lot better than yesterday.      Review Of Systems:   Review of Systems  CV: No chest pain or palpitations  PULM: Improved SOA    Objective      Temp:  [97.9 °F (36.6 °C)] 97.9 °F (36.6 °C)  Heart Rate:  [71-85] 85  Resp:  [16-20] 16  BP: (111-162)/(46-79) 128/65  Physical Exam  Constitutional: Morbidly obese female, no acute distress, awake, alert, sitting up in bed eating dinner  HENT: NCAT, mucous membranes moist  Respiratory: Breathing nonlabored.  Decreased breath sounds at bases bilaterally.   Cardiovascular: RRR, no murmurs, rubs, or gallops  Gastrointestinal: Positive bowel sounds, soft, nontender, nondistended  Musculoskeletal: 1+ pitting LE edema bilaterally with skin wrinkling  Psychiatric: oriented x 3, appropriate affect, cooperative  Neurologic: Moves all extremities equally, CN grossly in tact to confrontation, speech is clear  Derm: no rashes    Results Review:    I have reviewed the labs, radiology results and diagnostic studies.      Results from last 7 days  Lab Units 09/04/17  0434   WBC 10*3/mm3 9.10   HEMOGLOBIN g/dL 7.3*   PLATELETS 10*3/mm3 262       Results from last 7 days  Lab Units 09/04/17  0434   SODIUM mmol/L 141   POTASSIUM mmol/L 4.4   CHLORIDE mmol/L 102   CO2 mmol/L 33.0*   BUN mg/dL 48*   CREATININE mg/dL 2.10*   GLUCOSE mg/dL 115*   CALCIUM mg/dL 8.5*       Culture Data: Cultures:    Blood Culture   Date Value Ref Range Status   09/02/2017 No growth at 24 hours  Preliminary   09/02/2017 No growth at 24 hours  Preliminary       Radiology Data:   Imaging Results (last 72 hours)     Procedure Component Value Units Date/Time    CT Chest Without Contrast [[id number]] Collected:  09/02/17 1346     Updated:   09/02/17 1403    Narrative:       EXAMINATION: CT CHEST WO CONTRAST-      INDICATION: dyspnea         TECHNIQUE:      CT data set of the chest mediastinum was performed without intravenous  contrast.     The radiation dose reduction device was turned on for each scan per the  ALARA (As Low as Reasonably Achievable) protocol.     COMPARISON: NONE     FINDINGS:   1. There is a sizable right chest effusion. There is a small left base  effusion.  2. Four-chamber cardiomegaly is noted without pericardial effusion.        3. The patient is morbidly obese. There is consolidative airspace  opacity right mid and lower lung.  4. Images into the upper abdomen are nonrevealing but distorted because  of the patient's size.  5. Extended window datasets demonstrate mild groundglass disease with  linear consolidation in both mid and lower lung zones.             Impression:          Marked obesity.     Sizable right chest effusion with consolidative disease mid and lower  lung zones. Small left base effusion. Mild interstitial groundglass  disease. Four-chamber cardiomegaly without pericardial effusion. No  definite mediastinal mass.        This report was finalized on 9/2/2017 2:01 PM by Dr. Paramjit Sibley MD.       XR Chest 1 View [[id number]] Collected:  09/02/17 1845     Updated:  09/02/17 1851    Narrative:       EXAMINATION: XR CHEST, SINGLE VIEW - 09/02/2017     INDICATION: Shortness of air.     COMPARISON: None.     FINDINGS:   1. There is cardiomegaly with diffuse vascular congestive edema  throughout both lungs. Mild coalescent opacities are seen at the bases  with trace effusions.           Impression:       Radiographic findings suggest cardiac failure, pulmonary  vascular and interstitial edema and progressive findings when compared  to 08/03/2017.     DICTATED:     09/02/2017  EDITED:         09/02/2017     This report was finalized on 9/2/2017 6:49 PM by Dr. Paramjit Sibley MD.             I have reviewed the  medications.    Assessment/Plan     Problem List  Hospital Problem List     * (Principal)Pleural effusion, right    Diabetes mellitus    Hypertension    Morbid obesity    Anxiety    Anemia    Overview Addendum 8/28/2017 11:03 AM by Piyush Stokes MD     Iron deficient         CKD (chronic kidney disease) Stage 3    Overview Signed 8/28/2017 11:03 AM by Piyush Stokes MD     Followed by nephrology associates (improving)         Leukocytosis    Possible pneumonia of RML/RLL    Acute on chronic respiratory failure with hypoxia           Assessment/Plan:    1. Acute on chronic hypoxic respiratory failure, right pleural effusion secondary to acute on chronic diastolic CHF and bacterial pneumonia  -CT chest shows RML/RLL consolidation along with elevated WBC at presentation is suggestive of pneumonia, however four-chamber cardiac dilation, vascular congestion on X-ray and peripheral edema favor a cardiogenic contribution  -Echocardiogram with normal LVEF, LVH  -Continue diuresis today as she is improving with I/O monitoring, daily weights and repeat CXR tomorrow am to re-evaluate effusion size  -Continue workup/treatment of pneumonia with Zosyn and levofloxacin to cover healthcare-acquired organisms.  Sputum culture, blood culture, S. pneumo and legionella urinary antigens pending.  May de-escalate to levofloxacin if she continues to improve clinically and there is no culture data.  -May need diagnostic/therapeutic thoracentesis if effusion persists despite above but this would be difficult due to body habitus    2. CKD III  -Monitor renal function with diuresis    3. DM2  -SSI    DVT prophylaxis: SQ heparin  Discharge Planning: I expect patient to be discharged in 2-3 days.    Liyah Chen MD   09/04/17   10:00 PM

## 2017-09-05 NOTE — PROGRESS NOTES
[facility] Medicine Services  INPATIENT PROGRESS NOTE    Date of Admission: 9/2/2017  Length of Stay: 3  Primary Care Physician: Trevon Delarosa MD    Subjective   CC: F/U SOA  HPI:  Lower extremity edema continues to improve.  She has pain today due to her arthritis.    Review Of Systems:   Review of Systems  CV: No chest pain or palpitations  PULM: Improved SOA    Objective      Temp:  [97.8 °F (36.6 °C)-98.4 °F (36.9 °C)] 97.8 °F (36.6 °C)  Heart Rate:  [75-85] 82  Resp:  [16-20] 20  BP: (128-159)/(65-73) 159/71  Physical Exam  Constitutional: Morbidly obese female, mild distress 2/2 musculoskeletal pain, awake, alert  HENT: NCAT, mucous membranes moist  Respiratory: Breathing nonlabored.  Decreased breath sounds at bases bilaterally.   Cardiovascular: RRR  Gastrointestinal: Abdomen nondistended  Musculoskeletal: 1+ pitting LE edema bilaterally with skin wrinkling-improved  Psychiatric: oriented x 3, appropriate affect, cooperative  Neurologic: Moves all extremities equally, CN grossly in tact to confrontation, speech is clear  Derm: no rashes    Results Review:    I have reviewed the labs, radiology results and diagnostic studies.      Results from last 7 days  Lab Units 09/04/17  0434   WBC 10*3/mm3 9.10   HEMOGLOBIN g/dL 7.3*   PLATELETS 10*3/mm3 262       Results from last 7 days  Lab Units 09/05/17  0453   SODIUM mmol/L 140   POTASSIUM mmol/L 4.2   CHLORIDE mmol/L 100   CO2 mmol/L 32.0*   BUN mg/dL 51*   CREATININE mg/dL 2.10*   GLUCOSE mg/dL 87   CALCIUM mg/dL 8.5*       Culture Data: Cultures:    Blood Culture   Date Value Ref Range Status   09/02/2017 No growth at 24 hours  Preliminary   09/02/2017 No growth at 24 hours  Preliminary       Radiology Data:   Imaging Results (last 24 hours)     Procedure Component Value Units Date/Time    XR Chest 1 View [[id number]] Collected:  09/05/17 0840     Updated:  09/05/17 1121    Narrative:       EXAMINATION: XR CHEST 1 VW-       INDICATION: Evaluate pleural effusion; T29-Ivfvhix effusion, not  elsewhere classified; R91.8-Other nonspecific abnormal finding of the  lung field; R06.00-Dyspnea, unspecified; D64.9-Anemia, unspecified.      COMPARISON: 09/02/2017.     FINDINGS: Portable chest reveals airspace disease worsening in the right  lung base in the interval. Lung volumes are low. Heart is enlarged.  Increased pulmonary vascularity bilaterally. Degenerative change is seen  within the spine.           Impression:       Airspace disease worsening within the right lung base.  Degenerative change is seen within the spine.     D:  09/05/2017  E:  09/05/2017     This report was finalized on 9/5/2017 11:19 AM by Dr. Rosalie Kline MD.           I have reviewed the medications.    Assessment/Plan     Problem List  Hospital Problem List     * (Principal)Pleural effusion, right    Diabetes mellitus    Hypertension    Morbid obesity    Anxiety    Anemia    Overview Addendum 8/28/2017 11:03 AM by Piyush Stokes MD     Iron deficient         CKD (chronic kidney disease) Stage 3    Overview Signed 8/28/2017 11:03 AM by Piyush Stokes MD     Followed by nephrology associates (improving)         Leukocytosis    Possible pneumonia of RML/RLL    Acute on chronic respiratory failure with hypoxia           Assessment/Plan:    1. Acute on chronic hypoxic respiratory failure, right pleural effusion secondary to acute on chronic diastolic CHF and bacterial pneumonia  -CT chest shows RML/RLL consolidation along with elevated WBC at presentation is suggestive of pneumonia, however four-chamber cardiac dilation, vascular congestion on X-ray and peripheral edema favor a cardiogenic contribution  -Echocardiogram with normal LVEF, LVH  -Continue diuresis today as she is improving with I/O monitoring, daily weights  -De-escalate antibiotics to PO levofloxacin given her continued clinical improvement, plan for 7 days total (through 9/8/17)  -Sputum  culture, blood culture, S. pneumo and legionella urinary antigens pending.    2. CKD III  -Monitor renal function with diuresis    3. DM2  -SSI    DVT prophylaxis: SQ heparin  Discharge Planning: I expect patient to be discharged in 2-3 days.    Liyah Chen MD   09/05/17   4:45 PM

## 2017-09-05 NOTE — PROGRESS NOTES
Continued Stay Note  [facility]     Patient Name: Joy Bueno  MRN: [id number]  Today's Date: 9/5/2017    Admit Date: 9/2/2017          Discharge Plan       09/05/17 1346    Case Management/Social Work Plan    Plan [facility]    Patient/Family In Agreement With Plan yes    Additional Comments Spoke with Heather with [facility] and patient was there receiving skilled rehab.  Pt would like to return to [place] at discharge.  Per eHather with [facility], pt may return back to a skilled bed at [facility] when medically ready.  Will need transportation arranged via ambulance.  Please advise CM when patient is medically ready.  CM to follow, ext. [phone].              Discharge Codes     None        Expected Discharge Date and Time     Expected Discharge Date Expected Discharge Time    Sep 7, 2017             Mar Brock RN

## 2017-09-05 NOTE — NURSING NOTE
Specialty bed ordered for patient due to high risk for skin breakdown. WOCN will f/u and please call with any further issues or concerns. Skin interventions in place and RN Bhavna aware of POC.

## 2017-09-06 ENCOUNTER — APPOINTMENT (OUTPATIENT)
Dept: CT IMAGING | Facility: HOSPITAL | Age: 67
End: 2017-09-06

## 2017-09-06 LAB
ANION GAP SERPL CALCULATED.3IONS-SCNC: 4 MMOL/L (ref 3–11)
APTT PPP: 26.7 SECONDS (ref 24–31)
BACTERIA FLD CULT: NORMAL
BUN BLD-MCNC: 50 MG/DL (ref 9–23)
BUN/CREAT SERPL: 22.7 (ref 7–25)
CALCIUM SPEC-SCNC: 8.5 MG/DL (ref 8.7–10.4)
CHLORIDE SERPL-SCNC: 100 MMOL/L (ref 99–109)
CO2 SERPL-SCNC: 34 MMOL/L (ref 20–31)
CREAT BLD-MCNC: 2.2 MG/DL (ref 0.6–1.3)
GFR SERPL CREATININE-BSD FRML MDRD: 27 ML/MIN/1.73
GLUCOSE BLD-MCNC: 102 MG/DL (ref 70–100)
GLUCOSE BLDC GLUCOMTR-MCNC: 108 MG/DL (ref 70–130)
GLUCOSE BLDC GLUCOMTR-MCNC: 109 MG/DL (ref 70–130)
GLUCOSE BLDC GLUCOMTR-MCNC: 142 MG/DL (ref 70–130)
GLUCOSE BLDC GLUCOMTR-MCNC: 156 MG/DL (ref 70–130)
INR PPP: 1.06
L PNEUMO1 AG UR QL IA: NEGATIVE
LDH SERPL-CCNC: 162 U/L (ref 120–246)
Lab: NORMAL
ORGANISM ID: NORMAL
POTASSIUM BLD-SCNC: 4 MMOL/L (ref 3.5–5.5)
PROT SERPL-MCNC: 7 G/DL (ref 5.7–8.2)
PROTHROMBIN TIME: 11.6 SECONDS (ref 9.6–11.5)
S PNEUM AG SPEC QL LA: NEGATIVE
SODIUM BLD-SCNC: 138 MMOL/L (ref 132–146)
SPECIMEN SOURCE: NORMAL

## 2017-09-06 PROCEDURE — 94799 UNLISTED PULMONARY SVC/PX: CPT

## 2017-09-06 PROCEDURE — 25010000002 HEPARIN (PORCINE) PER 1000 UNITS: Performed by: NURSE PRACTITIONER

## 2017-09-06 PROCEDURE — 25010000002 FUROSEMIDE PER 20 MG: Performed by: NURSE PRACTITIONER

## 2017-09-06 PROCEDURE — 83615 LACTATE (LD) (LDH) ENZYME: CPT | Performed by: INTERNAL MEDICINE

## 2017-09-06 PROCEDURE — 85730 THROMBOPLASTIN TIME PARTIAL: CPT | Performed by: RADIOLOGY

## 2017-09-06 PROCEDURE — 75989 ABSCESS DRAINAGE UNDER X-RAY: CPT

## 2017-09-06 PROCEDURE — 84155 ASSAY OF PROTEIN SERUM: CPT | Performed by: INTERNAL MEDICINE

## 2017-09-06 PROCEDURE — 94640 AIRWAY INHALATION TREATMENT: CPT

## 2017-09-06 PROCEDURE — 82962 GLUCOSE BLOOD TEST: CPT

## 2017-09-06 PROCEDURE — 85610 PROTHROMBIN TIME: CPT | Performed by: RADIOLOGY

## 2017-09-06 PROCEDURE — BW24ZZZ COMPUTERIZED TOMOGRAPHY (CT SCAN) OF CHEST AND ABDOMEN: ICD-10-PCS | Performed by: RADIOLOGY

## 2017-09-06 PROCEDURE — 99232 SBSQ HOSP IP/OBS MODERATE 35: CPT | Performed by: INTERNAL MEDICINE

## 2017-09-06 PROCEDURE — 25010000002 LEVOFLOXACIN PER 250 MG: Performed by: NURSE PRACTITIONER

## 2017-09-06 PROCEDURE — 80048 BASIC METABOLIC PNL TOTAL CA: CPT | Performed by: INTERNAL MEDICINE

## 2017-09-06 RX ORDER — LEVOFLOXACIN 750 MG/1
750 TABLET ORAL EVERY OTHER DAY
Status: DISCONTINUED | OUTPATIENT
Start: 2017-09-08 | End: 2017-09-07 | Stop reason: HOSPADM

## 2017-09-06 RX ADMIN — INSULIN LISPRO 2 UNITS: 100 INJECTION, SOLUTION INTRAVENOUS; SUBCUTANEOUS at 16:33

## 2017-09-06 RX ADMIN — HEPARIN SODIUM 5000 UNITS: 5000 INJECTION, SOLUTION INTRAVENOUS; SUBCUTANEOUS at 14:25

## 2017-09-06 RX ADMIN — DOCUSATE SODIUM 100 MG: 100 CAPSULE, LIQUID FILLED ORAL at 09:39

## 2017-09-06 RX ADMIN — ESCITALOPRAM OXALATE 10 MG: 10 TABLET ORAL at 09:39

## 2017-09-06 RX ADMIN — HEPARIN SODIUM 5000 UNITS: 5000 INJECTION, SOLUTION INTRAVENOUS; SUBCUTANEOUS at 20:13

## 2017-09-06 RX ADMIN — HYDRALAZINE HYDROCHLORIDE 75 MG: 25 TABLET ORAL at 09:39

## 2017-09-06 RX ADMIN — IPRATROPIUM BROMIDE AND ALBUTEROL SULFATE 3 ML: .5; 3 SOLUTION RESPIRATORY (INHALATION) at 19:33

## 2017-09-06 RX ADMIN — HYDRALAZINE HYDROCHLORIDE 75 MG: 25 TABLET ORAL at 16:27

## 2017-09-06 RX ADMIN — CLONAZEPAM 0.25 MG: 0.5 TABLET ORAL at 09:48

## 2017-09-06 RX ADMIN — FUROSEMIDE 40 MG: 10 INJECTION, SOLUTION INTRAMUSCULAR; INTRAVENOUS at 09:39

## 2017-09-06 RX ADMIN — ISOSORBIDE MONONITRATE 60 MG: 60 TABLET, EXTENDED RELEASE ORAL at 09:39

## 2017-09-06 RX ADMIN — HYDRALAZINE HYDROCHLORIDE 75 MG: 25 TABLET ORAL at 20:13

## 2017-09-06 RX ADMIN — BUDESONIDE AND FORMOTEROL FUMARATE DIHYDRATE 2 PUFF: 160; 4.5 AEROSOL RESPIRATORY (INHALATION) at 19:33

## 2017-09-06 RX ADMIN — HYDROCODONE BITARTRATE AND ACETAMINOPHEN 1 TABLET: 5; 325 TABLET ORAL at 01:46

## 2017-09-06 RX ADMIN — HEPARIN SODIUM 5000 UNITS: 5000 INJECTION, SOLUTION INTRAVENOUS; SUBCUTANEOUS at 05:31

## 2017-09-06 RX ADMIN — Medication 250 MG: at 09:39

## 2017-09-06 RX ADMIN — LEVOFLOXACIN 750 MG: 750 INJECTION, SOLUTION INTRAVENOUS at 16:28

## 2017-09-06 RX ADMIN — BUDESONIDE AND FORMOTEROL FUMARATE DIHYDRATE 2 PUFF: 160; 4.5 AEROSOL RESPIRATORY (INHALATION) at 08:07

## 2017-09-06 RX ADMIN — SIMETHICONE CHEW TAB 80 MG 80 MG: 80 TABLET ORAL at 22:14

## 2017-09-06 RX ADMIN — ATORVASTATIN CALCIUM 10 MG: 10 TABLET, FILM COATED ORAL at 20:13

## 2017-09-06 RX ADMIN — AMLODIPINE BESYLATE 5 MG: 5 TABLET ORAL at 09:39

## 2017-09-06 RX ADMIN — HYDROCODONE BITARTRATE AND ACETAMINOPHEN 1 TABLET: 5; 325 TABLET ORAL at 14:25

## 2017-09-06 RX ADMIN — FUROSEMIDE 40 MG: 10 INJECTION, SOLUTION INTRAMUSCULAR; INTRAVENOUS at 20:13

## 2017-09-06 RX ADMIN — HYDROCODONE BITARTRATE AND ACETAMINOPHEN 1 TABLET: 5; 325 TABLET ORAL at 20:19

## 2017-09-06 RX ADMIN — IPRATROPIUM BROMIDE AND ALBUTEROL SULFATE 3 ML: .5; 3 SOLUTION RESPIRATORY (INHALATION) at 08:04

## 2017-09-06 RX ADMIN — CLONAZEPAM 0.25 MG: 0.5 TABLET ORAL at 20:20

## 2017-09-06 RX ADMIN — Medication 250 MG: at 18:40

## 2017-09-06 NOTE — NURSING NOTE
Received pt to CT3. Pt arrived on 7L NC. Pt lifted to procedure table, monitors applied. Answered pt questions regarding procedure.

## 2017-09-06 NOTE — PLAN OF CARE
Problem: Patient Care Overview (Adult)  Goal: Plan of Care Review  Outcome: Ongoing (interventions implemented as appropriate)    09/06/17 0350   Coping/Psychosocial Response Interventions   Plan Of Care Reviewed With patient   Outcome Evaluation   Outcome Summary/Follow up Plan pain at times during night, bm x1, vss, weak,    Patient Care Overview   Progress progress towards functional goals is fair

## 2017-09-06 NOTE — PROGRESS NOTES
Continued Stay Note  [facility]     Patient Name: Joy Bueno  MRN: [id number]  Today's Date: 9/6/2017    Admit Date: 9/2/2017          Discharge Plan       09/06/17 1157    Case Management/Social Work Plan    Plan Return to [facility]    Patient/Family In Agreement With Plan yes    Additional Comments Plan remains to transfer patient back to a skilled bed at [facility].  Tentative transfer arranged for tomorrow-9/7.  CM arranged transfer back to [facility] via [facility] ambulance for 9/7/17 @ 1200 pm.  Will need transfer summary and hard scripts to send with patient.  CM will continue to follow and assist with discharge needs, ext. [phone].              Discharge Codes     None        Expected Discharge Date and Time     Expected Discharge Date Expected Discharge Time    Sep 7, 2017             Mar Brock RN

## 2017-09-06 NOTE — PROGRESS NOTES
Continued Stay Note  [facility]     Patient Name: Joy Bueno  MRN: [id number]  Today's Date: 9/6/2017    Admit Date: 9/2/2017          Discharge Plan   Consent obtained for the participation in the [facility] Transitions Program. Ca Allred RN        09/06/17 1152    Case Management/Social Work Plan    Plan Return to [facility]    Patient/Family In Agreement With Plan yes    Additional Comments Plan remains to transfer patient back to a skilled bed at [facility].  Tentative transfer arranged for tomorrow-9/7.  CM arranged transfer back to [facility] via [facility] ambulance for 9/7/17 @ 1200 pm.  Will need transfer summary and hard scripts to send with patient.  CM will continue to follow and assist with discharge needs, ext. [phone].              Discharge Codes     None        Expected Discharge Date and Time     Expected Discharge Date Expected Discharge Time    Sep 7, 2017             Ca Allred RN

## 2017-09-06 NOTE — PLAN OF CARE
Problem: Patient Care Overview (Adult)  Goal: Plan of Care Review  Outcome: Ongoing (interventions implemented as appropriate)    09/06/17 0196   Coping/Psychosocial Response Interventions   Plan Of Care Reviewed With patient   Outcome Evaluation   Outcome Summary/Follow up Plan Pt was unable to have thoracentesis today. She continues with IV antibiotics and current course of therapy.   Patient Care Overview   Progress no change         Problem: Fall Risk (Adult)  Goal: Absence of Falls  Outcome: Ongoing (interventions implemented as appropriate)    Problem: Respiratory Insufficiency (Adult)  Goal: Acid/Base Balance  Outcome: Ongoing (interventions implemented as appropriate)  Goal: Effective Ventilation  Outcome: Ongoing (interventions implemented as appropriate)    Problem: Skin Integrity Impairment, Risk/Actual (Adult)  Goal: Skin Integrity/Wound Healing  Outcome: Ongoing (interventions implemented as appropriate)

## 2017-09-06 NOTE — NURSING NOTE
Pt scanned, Pt very anxious, BP will not take. Pt will be returned to her room per Dr. Kline. Dr. Kline to speak with pt doctor.  Pt transferred back to her bed, report called to pt nurse on 4H, pt returned to room via hospital transport.

## 2017-09-06 NOTE — NURSING NOTE
I called lab and spoke to phlebotomist for the [facility]. The labs were ordered stat at 8am and have still not been collected. She was going to check on them and obtain if they have not been collected.

## 2017-09-07 VITALS
OXYGEN SATURATION: 98 % | SYSTOLIC BLOOD PRESSURE: 160 MMHG | DIASTOLIC BLOOD PRESSURE: 76 MMHG | HEIGHT: 65 IN | RESPIRATION RATE: 20 BRPM | TEMPERATURE: 99.2 F | BODY MASS INDEX: 48.82 KG/M2 | WEIGHT: 293 LBS | HEART RATE: 93 BPM

## 2017-09-07 PROBLEM — N19 RENAL FAILURE: Status: RESOLVED | Noted: 2017-08-03 | Resolved: 2017-09-07

## 2017-09-07 PROBLEM — D72.829 LEUKOCYTOSIS: Status: RESOLVED | Noted: 2017-09-02 | Resolved: 2017-09-07

## 2017-09-07 PROBLEM — J96.21 ACUTE ON CHRONIC RESPIRATORY FAILURE WITH HYPOXIA (HCC): Status: RESOLVED | Noted: 2017-09-02 | Resolved: 2017-09-07

## 2017-09-07 LAB
ANION GAP SERPL CALCULATED.3IONS-SCNC: 9 MMOL/L (ref 3–11)
BACTERIA SPEC AEROBE CULT: NORMAL
BACTERIA SPEC AEROBE CULT: NORMAL
BUN BLD-MCNC: 48 MG/DL (ref 9–23)
BUN/CREAT SERPL: 21.8 (ref 7–25)
CALCIUM SPEC-SCNC: 8.7 MG/DL (ref 8.7–10.4)
CHLORIDE SERPL-SCNC: 98 MMOL/L (ref 99–109)
CO2 SERPL-SCNC: 31 MMOL/L (ref 20–31)
CREAT BLD-MCNC: 2.2 MG/DL (ref 0.6–1.3)
DEPRECATED RDW RBC AUTO: 55.6 FL (ref 37–54)
ERYTHROCYTE [DISTWIDTH] IN BLOOD BY AUTOMATED COUNT: 16.6 % (ref 11.3–14.5)
GFR SERPL CREATININE-BSD FRML MDRD: 27 ML/MIN/1.73
GLUCOSE BLD-MCNC: 85 MG/DL (ref 70–100)
GLUCOSE BLDC GLUCOMTR-MCNC: 100 MG/DL (ref 70–130)
GLUCOSE BLDC GLUCOMTR-MCNC: 157 MG/DL (ref 70–130)
HCT VFR BLD AUTO: 23.8 % (ref 34.5–44)
HGB BLD-MCNC: 7.2 G/DL (ref 11.5–15.5)
MCH RBC QN AUTO: 27.6 PG (ref 27–31)
MCHC RBC AUTO-ENTMCNC: 30.3 G/DL (ref 32–36)
MCV RBC AUTO: 91.2 FL (ref 80–99)
PLATELET # BLD AUTO: 237 10*3/MM3 (ref 150–450)
PMV BLD AUTO: 9.5 FL (ref 6–12)
POTASSIUM BLD-SCNC: 3.7 MMOL/L (ref 3.5–5.5)
RBC # BLD AUTO: 2.61 10*6/MM3 (ref 3.89–5.14)
SODIUM BLD-SCNC: 138 MMOL/L (ref 132–146)
WBC NRBC COR # BLD: 9.66 10*3/MM3 (ref 3.5–10.8)

## 2017-09-07 PROCEDURE — 94799 UNLISTED PULMONARY SVC/PX: CPT

## 2017-09-07 PROCEDURE — 94640 AIRWAY INHALATION TREATMENT: CPT

## 2017-09-07 PROCEDURE — 85027 COMPLETE CBC AUTOMATED: CPT | Performed by: INTERNAL MEDICINE

## 2017-09-07 PROCEDURE — 94760 N-INVAS EAR/PLS OXIMETRY 1: CPT

## 2017-09-07 PROCEDURE — 80048 BASIC METABOLIC PNL TOTAL CA: CPT | Performed by: INTERNAL MEDICINE

## 2017-09-07 PROCEDURE — 82962 GLUCOSE BLOOD TEST: CPT

## 2017-09-07 PROCEDURE — 25010000002 HEPARIN (PORCINE) PER 1000 UNITS: Performed by: NURSE PRACTITIONER

## 2017-09-07 RX ORDER — LEVOFLOXACIN 750 MG/1
750 TABLET ORAL EVERY OTHER DAY
Qty: 2 TABLET | Refills: 0
Start: 2017-09-08 | End: 2017-09-11

## 2017-09-07 RX ADMIN — CLONAZEPAM 0.25 MG: 0.5 TABLET ORAL at 09:45

## 2017-09-07 RX ADMIN — ISOSORBIDE MONONITRATE 60 MG: 60 TABLET, EXTENDED RELEASE ORAL at 09:35

## 2017-09-07 RX ADMIN — DOCUSATE SODIUM 100 MG: 100 CAPSULE, LIQUID FILLED ORAL at 09:34

## 2017-09-07 RX ADMIN — HYDROCODONE BITARTRATE AND ACETAMINOPHEN 1 TABLET: 5; 325 TABLET ORAL at 02:02

## 2017-09-07 RX ADMIN — INSULIN LISPRO 2 UNITS: 100 INJECTION, SOLUTION INTRAVENOUS; SUBCUTANEOUS at 11:57

## 2017-09-07 RX ADMIN — ESCITALOPRAM OXALATE 10 MG: 10 TABLET ORAL at 09:34

## 2017-09-07 RX ADMIN — IPRATROPIUM BROMIDE AND ALBUTEROL SULFATE 3 ML: .5; 3 SOLUTION RESPIRATORY (INHALATION) at 09:10

## 2017-09-07 RX ADMIN — HYDRALAZINE HYDROCHLORIDE 75 MG: 25 TABLET ORAL at 09:35

## 2017-09-07 RX ADMIN — HEPARIN SODIUM 5000 UNITS: 5000 INJECTION, SOLUTION INTRAVENOUS; SUBCUTANEOUS at 05:48

## 2017-09-07 RX ADMIN — BUDESONIDE AND FORMOTEROL FUMARATE DIHYDRATE 2 PUFF: 160; 4.5 AEROSOL RESPIRATORY (INHALATION) at 09:16

## 2017-09-07 RX ADMIN — POLYETHYLENE GLYCOL 3350 17 G: 17 POWDER, FOR SOLUTION ORAL at 09:34

## 2017-09-07 RX ADMIN — AMLODIPINE BESYLATE 5 MG: 5 TABLET ORAL at 09:35

## 2017-09-07 RX ADMIN — Medication 250 MG: at 09:34

## 2017-09-07 RX ADMIN — HYDROCODONE BITARTRATE AND ACETAMINOPHEN 1 TABLET: 5; 325 TABLET ORAL at 05:48

## 2017-09-07 RX ADMIN — SIMETHICONE CHEW TAB 80 MG 80 MG: 80 TABLET ORAL at 13:00

## 2017-09-07 NOTE — DISCHARGE SUMMARY
[facility] Medicine Services  DISCHARGE SUMMARY       Date of Admission: 9/2/2017  Date of Discharge:  9/7/2017  Primary Care Physician: Trevon Delarosa MD  Consulting Physician(s)          None           Discharge Diagnoses:  Active Hospital Problems (** Indicates Principal Problem)    Diagnosis Date Noted   • **Pleural effusion, right [J90] 09/02/2017   • Possible pneumonia of RML/RLL [J18.9] 09/02/2017   • CKD (chronic kidney disease) Stage 3 [N18.9] 08/28/2017     Followed by [facility] (improving)     • Anemia [D64.9] 08/23/2017     Iron deficient     • Diabetes mellitus [E11.9] 08/03/2017   • Hypertension [I10] 08/03/2017   • Morbid obesity [E66.01]    • Anxiety [F41.9]       Resolved Hospital Problems    Diagnosis Date Noted Date Resolved   • Leukocytosis [D72.829] 09/02/2017 09/07/2017   • Acute on chronic respiratory failure with hypoxia [J96.21] 09/02/2017 09/07/2017       Presenting Problem/History of Present Illness  Pleural effusion, right [J90]     Chief Complaint on Day of Discharge:     History of Present Illness on Day of Discharge:     Hospital Course  Patient is a 67-year-old -American female with past medical history of hypertension, diabetes, osteoarthritis, asthma, renal failure, and anxiety.  She's had 2 recent hospitalizations in August.  She was admitted from 8/3-8/12/17 with renal failure and status post renal biopsy.  She was discharged to rehabilitation with continued follow-up with nephrology.  During that hospitalization, offered colonoscopy for further workup of anemia and patient declined at that time.  She was also treated with Rocephin for Escherichia coli UTI.  Patient was admitted from rehabilitation on 8/23-8/30/17 with increased weakness.  During that hospitalization, patient continued to have anemia requiring blood and iron transfusion.  Patient had EGD on 8/25/17 that showed reactive gastropathy with minimal chronic gastritis.   Carlos george had a colonoscopy on 8/28 that showed scattered diverticula and grade 1 internal hemorrhoids.  Nephrology followed during both hospitalizations and patient was discharged to [facility] for rehabilitation.  Patient reports feeling well  will at rehabilitation until 0300 this morning she felt short of breath and attributed to a panic attack.  When she woke up this morning and continued to feel significantly short of breath she asked them to call EMS because she knew it wasn't a panic attack.  Patient was transported to [facility] ED.  Patient does wear home oxygen at reported 4 L although patient was unable to tell me how much oxygen she wears.  Patient also complains of productive cough with whitish, clear phlegm.  She denies chest pain, abdominal pain, or palpitations.  She reports no difficulty urinating and had BM today and denies noting any blood.  She does wear depends and is incontinent of urine.  She reports at rehabilitation she has been able to stand only but has not been able to walk due to fatigue and weakness.  Evaluation in ED showed WBC 12.76.  Creatinine 2.10 which appears to be her baseline.  H/H was 8.3/28.5.  .  Troponin levels normal.  CT chest showed sizable right chest effusion with consolidative disease in mid and lower lung zones.  Small left base effusion.  Mild interstitial groundglass disease.  Four chamber cardiomegaly without pericardial effusion.  Patient was given IV antibiotics for possible pneumonia.  Patient will be admitted by the hospital medicine service for further evaluation and management.    Acute on chronic hypoxic respiratory failure  - improved, currently on 2 liters NC  - secondary to possible pneumonia, volume overload, pleural effusion, asthma.  - change back to oral diuretics  - nebs prn    Possible RML/RLL pneumonia  - continue levaquin to complete 10 days  - attempted thoracentesis but unable to obtain due to body habitus  - cultures no growth to date  -  "suggest repeat CT chest without contrast in one month.    CKDIII  - creatinine near baseline  - avoid NSAIDS or other nephrotoxins  - follow up [facility] in two weeks (may already have an appointment from prior discharge)  - suggest weekly BMP while at rehab    Anemia  - iron deficient and secondary to CKD  - received IV iron last admission, may need EPO  - follow up [facility] 2 weeks  - suggest weekly CBC while at rehab.    Pleural effusion  - restart diuretics, continue antibiotics (levaquin) to complete 10 days therapy  - repeat CT chest one month    Morbid Obesity    Generalized weakness  - PT/OT/Mobilize    Patient needs PCP in [place]. Suggest referral to a new PCP to be scheduled one week post rehab discharge.      Procedures Performed         Consults:   Consults     Date and Time Order Name Status Description    8/27/2017 1735 Inpatient Consult to Nephrology Completed     8/24/2017 1018 Inpatient Consult to Gastroenterology Completed           Pertinent Test Results:    Condition on Discharge:  fair    Physical Exam on Discharge:/81  Pulse 94  Temp 99.2 °F (37.3 °C) (Oral)   Resp 20  Ht 65\" (165.1 cm)  Wt (!) 389 lb 1.6 oz (176 kg)  SpO2 98%  BMI 64.75 kg/m2  Physical Exam  Gen-no acute distress, non toxic, in bed  HEENT-NCAT, oropharynx clear  CV-RRR, S1 S2 normal, no m/r/g  Resp-grossly clear bilaterally, no wheezes  Abd-soft, non-tender, non-distended, normo active bowel sounds, morbidly obese  Ext-No lower extremity cyanosis, clubbing but 1+ edema bilaterally  Neuro-alert and oriented x 3, speech clear, moves all extremities (wiggles toes on request)  Psych-flat affect   Skin- No rash on exposed UE or LE bilaterally      Discharge Disposition  Rehab Facility or Unit (DC - External)    Discharge Medications   Joy Bueno   Home Medication Instructions JESSICA:[id number]    Printed on:09/07/17 113   Medication Information                      acetaminophen " (TYLENOL) 325 MG tablet  Take 650 mg by mouth Every 4 (Four) Hours As Needed for Mild Pain (1-3).             albuterol (PROVENTIL HFA;VENTOLIN HFA) 108 (90 BASE) MCG/ACT inhaler  Inhale 2 puffs Every 4 (Four) Hours As Needed for Wheezing.             amLODIPine (NORVASC) 5 MG tablet  Take 1 tablet by mouth Daily.             atorvastatin (LIPITOR) 10 MG tablet  Take 10 mg by mouth Every Night.             Cholecalciferol (VITAMIN D3) 85105 units tablet  Take 50,000 Units by mouth Every 7 (Seven) Days.             clonazePAM (KlonoPIN) 0.5 MG tablet  Take 0.25 mg by mouth 2 (Two) Times a Day As Needed for Seizures.             docusate sodium (COLACE) 100 MG capsule  Take 100 mg by mouth Daily. Hold for loose stool             escitalopram (LEXAPRO) 10 MG tablet  Take 1 tablet by mouth Daily.             furosemide (LASIX) 40 MG tablet  Take 40 mg by mouth 2 (Two) Times a Day.             hydrALAZINE (APRESOLINE) 50 MG tablet  Take 1.5 tablets by mouth 3 (Three) Times a Day.             HYDROcodone-acetaminophen (NORCO) 5-325 MG per tablet  Take 1 tablet by mouth Every 4 (Four) Hours As Needed.             insulin lispro (humaLOG) 100 UNIT/ML injection  Inject 0-7 Units under the skin 4 (Four) Times a Day Before Meals & at Bedtime.             isosorbide mononitrate (IMDUR) 60 MG 24 hr tablet  Take 1 tablet by mouth Daily.             lactulose (CHRONULAC) 10 GM/15ML solution  Take 20 g by mouth Every Night.             levoFLOXacin (LEVAQUIN) 750 MG tablet  Take 1 tablet by mouth Every Other Day for 2 doses. Indications: Pneumonia             mometasone-formoterol (DULERA 200) 200-5 MCG/ACT inhaler  Inhale 2 puffs 2 (Two) Times a Day.             Multiple Vitamins-Minerals (MULTIVITAMIN ADULTS PO)  Take 1 tablet by mouth Daily.             phenylephrine-shark liver oil-mineral oil-petrolatum (PREPARATION H) 0.25-3-14-71.9 % rectal ointment  Insert  into the rectum 3 (Three) Times a Day As Needed for Hemorrhoids.              polyethylene glycol (MIRALAX) packet  Take 17 g by mouth 2 (Two) Times a Day.             saccharomyces boulardii (FLORASTOR) 250 MG capsule  Take 1 capsule by mouth 2 (Two) Times a Day.             simethicone (MYLICON) 80 MG chewable tablet  Chew 80 mg Every 6 (Six) Hours As Needed for Flatulence.                 Discharge Diet:     Discharge Care Plan / Instructions:    Activity at Discharge:     Follow-up Appointments  No future appointments.  Additional Instructions for the Follow-ups that You Need to Schedule     Discharge Follow-up with PCP    As directed    Follow Up Details:  Needs new PCP here in [place]. suggest arranging follow up one week post discharge from rehab       Discharge Follow-up with Specialty    As directed    Specialty:  [facility]   Follow Up:  2 Weeks   Follow Up Details:  [facility] 2 weeks                 Test Results Pending at Discharge   Order Current Status    Blood Culture Preliminary result    Blood Culture Preliminary result           Hilton Robledo MD 09/07/17 11:38 AM    Time: Discharge 40 min    Please note that portions of this note may have been completed with a voice recognition program. Efforts were made to edit the dictations, but occasionally words are mistranscribed.

## 2017-09-07 NOTE — PROGRESS NOTES
Continued Stay Note  [facility]     Patient Name: Joy Bueno  MRN: [id number]  Today's Date: 9/7/2017    Admit Date: 9/2/2017          Discharge Plan       09/07/17 1137    Case Management/Social Work Plan    Plan Return to [facility]    Patient/Family In Agreement With Plan yes    Additional Comments Per Dr. Robledo, pt is medically ready to return back to [facility] today.  Ambulance arranged via [facility].  Nurse to call report to [phone].  Please send transfer summary and all hard scripts with patient.  Please call ext. [phone] with any questions.               Discharge Codes       09/07/17 1139    Discharge Codes    Discharge Codes 83  R- To skilled nursing facility        Expected Discharge Date and Time     Expected Discharge Date Expected Discharge Time    Sep 7, 2017             Mar Brock RN

## 2017-09-07 NOTE — PLAN OF CARE
Problem: Patient Care Overview (Adult)  Goal: Plan of Care Review  Outcome: Ongoing (interventions implemented as appropriate)    09/07/17 0512   Coping/Psychosocial Response Interventions   Plan Of Care Reviewed With patient   Patient Care Overview   Progress improving         Problem: Fall Risk (Adult)  Goal: Absence of Falls  Outcome: Ongoing (interventions implemented as appropriate)    Problem: Respiratory Insufficiency (Adult)  Goal: Acid/Base Balance  Outcome: Ongoing (interventions implemented as appropriate)  Goal: Effective Ventilation  Outcome: Ongoing (interventions implemented as appropriate)    Problem: Skin Integrity Impairment, Risk/Actual (Adult)  Goal: Skin Integrity/Wound Healing  Outcome: Ongoing (interventions implemented as appropriate)

## 2017-09-07 NOTE — PROGRESS NOTES
[facility] Medicine Services  INPATIENT PROGRESS NOTE    Date of Admission: 9/2/2017  Length of Stay: 4  Primary Care Physician: Trevon Delarosa MD    Subjective   CC: F/U SOA  HPI:  She feels much better.  Breathing and edema have improved.  She is not coughing much up.      Review Of Systems:   Review of Systems  CV: No chest pain or palpitations  PULM: Improved SOA    Objective      Temp:  [98.1 °F (36.7 °C)-98.9 °F (37.2 °C)] 98.9 °F (37.2 °C)  Heart Rate:  [] 97  Resp:  [16-22] 18  BP: (144-175)/() 150/66  Physical Exam  Constitutional: Morbidly obese female, NAD, awake, alert, sitting up in bed  HENT: NCAT, mucous membranes moist  Respiratory: Breathing nonlabored.  Decreased breath sounds at bases bilaterally.   Cardiovascular: RRR  Gastrointestinal: Abdomen nondistended  Musculoskeletal: 1+ pitting LE edema bilaterally with skin wrinkling-improved  Psychiatric: oriented x 3, appropriate affect, cooperative  Neurologic: Moves all extremities equally, CN grossly in tact to confrontation, speech is clear  Derm: no rashes    Results Review:    I have reviewed the labs, radiology results and diagnostic studies.      Results from last 7 days  Lab Units 09/04/17  0434   WBC 10*3/mm3 9.10   HEMOGLOBIN g/dL 7.3*   PLATELETS 10*3/mm3 262       Results from last 7 days  Lab Units 09/06/17  0440   SODIUM mmol/L 138   POTASSIUM mmol/L 4.0   CHLORIDE mmol/L 100   CO2 mmol/L 34.0*   BUN mg/dL 50*   CREATININE mg/dL 2.20*   GLUCOSE mg/dL 102*   CALCIUM mg/dL 8.5*       Culture Data: Cultures:    Radiology Data:   Imaging Results (last 24 hours)     Procedure Component Value Units Date/Time    CT Guided Thoracentesis Right [[id number]] Collected:  09/06/17 1446     Updated:  09/06/17 1631    Narrative:       EXAMINATION: CT-GUIDED THORACENTESIS, RIGHT-09/06/2017:      INDICATION: New right sided pleural effusion; Z08-Zfmzfmt effusion, not  elsewhere classified; R91.8-Other nonspecific  abnormal finding of lung  field; R06.00-Dyspnea, unspecified; D64.9-Anemia, unspecified, right  pleural effusion.     TECHNIQUE: Limited CT imaging was obtained of the chest for evaluation  of right pleural effusion.     The radiation dose reduction device was turned on for each scan per the  ALARA (As Low as Reasonably Achievable) protocol.     COMPARISON: NONE.     FINDINGS: There is a small-to-moderate sized right pleural effusion with  extensive atelectatic changes seen within the right mid and lower lung  field. Due to patient body habitus, the CT-guided thoracentesis could  not be performed. There is a tiny left pleural effusion present. Minimal  atelectatic changes seen within the left lung base.       Impression:       Limited evaluation of the chest revealing a  small-to-moderate sized right pleural effusion with consolidation  suggesting compensatory atelectasis within the right mid and lower lung  field. Tiny left pleural effusion present.     D:  09/06/2017  E:  09/06/2017           This report was finalized on 9/6/2017 4:29 PM by Dr. Rosalie Kline MD.           I have reviewed the medications.    Assessment/Plan     Problem List  Hospital Problem List     * (Principal)Pleural effusion, right    Diabetes mellitus    Hypertension    Morbid obesity    Anxiety    Anemia    Overview Addendum 8/28/2017 11:03 AM by Piyush Stokes MD     Iron deficient         CKD (chronic kidney disease) Stage 3    Overview Signed 8/28/2017 11:03 AM by Piyush Stokes MD     Followed by [facility] (improving)         Leukocytosis    Possible pneumonia of RML/RLL    Acute on chronic respiratory failure with hypoxia           Assessment/Plan:    1. Acute on chronic hypoxic respiratory failure, right pleural effusion secondary to acute on chronic diastolic CHF and bacterial pneumonia  -CT chest shows RML/RLL consolidation along with elevated WBC at presentation is suggestive of pneumonia, however  four-chamber cardiac dilation, vascular congestion on X-ray and peripheral edema favor a cardiogenic contribution  -Echocardiogram with normal LVEF, LVH  -Improved with diuresis  -Antibiotics de-escalated to PO levofloxacin given her continued clinical improvement, plan for 7 days total (through 9/8/17)  -Sputum culture, blood culture, S. pneumo and legionella urinary antigens pending.  -Attempt to drain pleural effusion today unsuccessful due to patient body habitus.  I have reviewed the CT scan with pulmonology and given her body habitus and size of effusion, the risks of bedside paracentesis likely outweigh benefits at this time.  Will plan to complete treatment for pneumonia and re-evaluate for resolution of effusion with repeat CT chest in 1 month.    2. CKD III  -Monitor renal function with diuresis    3. DM2  -SSI    DVT prophylaxis: SQ heparin  Discharge Planning: I expect patient to be discharged back to rehab in 1-2 days.    Liyah Chen MD   09/06/17   8:27 PM

## 2017-10-04 ENCOUNTER — APPOINTMENT (OUTPATIENT)
Dept: GENERAL RADIOLOGY | Facility: HOSPITAL | Age: 67
End: 2017-10-04

## 2017-10-04 ENCOUNTER — HOSPITAL ENCOUNTER (INPATIENT)
Facility: HOSPITAL | Age: 67
LOS: 8 days | Discharge: NURSING, REHAB OR LONG-TERM CARE | End: 2017-10-12
Attending: EMERGENCY MEDICINE | Admitting: INTERNAL MEDICINE

## 2017-10-04 ENCOUNTER — APPOINTMENT (OUTPATIENT)
Dept: NUCLEAR MEDICINE | Facility: HOSPITAL | Age: 67
End: 2017-10-04

## 2017-10-04 DIAGNOSIS — R53.1 GENERALIZED WEAKNESS: ICD-10-CM

## 2017-10-04 DIAGNOSIS — Z74.09 IMPAIRED FUNCTIONAL MOBILITY, BALANCE, GAIT, AND ENDURANCE: ICD-10-CM

## 2017-10-04 DIAGNOSIS — N18.9 CHRONIC KIDNEY DISEASE, UNSPECIFIED CKD STAGE: ICD-10-CM

## 2017-10-04 DIAGNOSIS — D64.9 CHRONIC ANEMIA: ICD-10-CM

## 2017-10-04 DIAGNOSIS — A04.72 CLOSTRIDIUM DIFFICILE DIARRHEA: ICD-10-CM

## 2017-10-04 DIAGNOSIS — N39.0 URINARY TRACT INFECTION ASSOCIATED WITH INDWELLING URETHRAL CATHETER, INITIAL ENCOUNTER (HCC): Primary | ICD-10-CM

## 2017-10-04 DIAGNOSIS — E66.01 MORBID OBESITY WITH BODY MASS INDEX OF 50.0-59.9 IN ADULT (HCC): ICD-10-CM

## 2017-10-04 DIAGNOSIS — Z74.09 IMPAIRED MOBILITY AND ADLS: ICD-10-CM

## 2017-10-04 DIAGNOSIS — Z78.9 IMPAIRED MOBILITY AND ADLS: ICD-10-CM

## 2017-10-04 DIAGNOSIS — Z79.4 TYPE 2 DIABETES MELLITUS WITH COMPLICATION, WITH LONG-TERM CURRENT USE OF INSULIN (HCC): ICD-10-CM

## 2017-10-04 DIAGNOSIS — N18.4 CHRONIC RENAL FAILURE, STAGE 4 (SEVERE) (HCC): ICD-10-CM

## 2017-10-04 DIAGNOSIS — E11.8 TYPE 2 DIABETES MELLITUS WITH COMPLICATION, WITH LONG-TERM CURRENT USE OF INSULIN (HCC): ICD-10-CM

## 2017-10-04 DIAGNOSIS — R19.7 DIARRHEA IN ADULT PATIENT: ICD-10-CM

## 2017-10-04 DIAGNOSIS — M79.604 BILATERAL LEG PAIN: ICD-10-CM

## 2017-10-04 DIAGNOSIS — R62.7 ADULT FAILURE TO THRIVE SYNDROME: ICD-10-CM

## 2017-10-04 DIAGNOSIS — M79.605 BILATERAL LEG PAIN: ICD-10-CM

## 2017-10-04 DIAGNOSIS — E66.01 MORBID OBESITY (HCC): ICD-10-CM

## 2017-10-04 DIAGNOSIS — T83.511A URINARY TRACT INFECTION ASSOCIATED WITH INDWELLING URETHRAL CATHETER, INITIAL ENCOUNTER (HCC): Primary | ICD-10-CM

## 2017-10-04 PROBLEM — E87.6 HYPOKALEMIA: Status: ACTIVE | Noted: 2017-10-04

## 2017-10-04 PROBLEM — N30.01 ACUTE CYSTITIS WITH HEMATURIA: Status: ACTIVE | Noted: 2017-10-04

## 2017-10-04 PROBLEM — E83.42 HYPOMAGNESEMIA: Status: ACTIVE | Noted: 2017-10-04

## 2017-10-04 LAB
ABO GROUP BLD: NORMAL
ALBUMIN SERPL-MCNC: 3.5 G/DL (ref 3.2–4.8)
ALBUMIN/GLOB SERPL: 0.8 G/DL (ref 1.5–2.5)
ALP SERPL-CCNC: 103 U/L (ref 25–100)
ALT SERPL W P-5'-P-CCNC: 7 U/L (ref 7–40)
AMORPH URATE CRY URNS QL MICRO: ABNORMAL /HPF
ANION GAP SERPL CALCULATED.3IONS-SCNC: 9 MMOL/L (ref 3–11)
AST SERPL-CCNC: 16 U/L (ref 0–33)
BACTERIA UR QL AUTO: ABNORMAL /HPF
BASOPHILS # BLD AUTO: 0.01 10*3/MM3 (ref 0–0.2)
BASOPHILS NFR BLD AUTO: 0.1 % (ref 0–1)
BILIRUB SERPL-MCNC: 0.6 MG/DL (ref 0.3–1.2)
BILIRUB UR QL STRIP: NEGATIVE
BLD GP AB SCN SERPL QL: NEGATIVE
BNP SERPL-MCNC: 366 PG/ML (ref 0–100)
BUN BLD-MCNC: 40 MG/DL (ref 9–23)
BUN/CREAT SERPL: 26.7 (ref 7–25)
CALCIUM SPEC-SCNC: 8.7 MG/DL (ref 8.7–10.4)
CHLORIDE SERPL-SCNC: 98 MMOL/L (ref 99–109)
CLARITY UR: ABNORMAL
CO2 SERPL-SCNC: 31 MMOL/L (ref 20–31)
COLOR UR: YELLOW
CREAT BLD-MCNC: 1.5 MG/DL (ref 0.6–1.3)
D DIMER PPP FEU-MCNC: 7.5 MG/L (FEU) (ref 0–0.5)
D-LACTATE SERPL-SCNC: 0.8 MMOL/L (ref 0.5–2)
DEPRECATED RDW RBC AUTO: 52.9 FL (ref 37–54)
EOSINOPHIL # BLD AUTO: 0.1 10*3/MM3 (ref 0–0.3)
EOSINOPHIL NFR BLD AUTO: 0.6 % (ref 0–3)
ERYTHROCYTE [DISTWIDTH] IN BLOOD BY AUTOMATED COUNT: 16.3 % (ref 11.3–14.5)
GFR SERPL CREATININE-BSD FRML MDRD: 42 ML/MIN/1.73
GLOBULIN UR ELPH-MCNC: 4.3 GM/DL
GLUCOSE BLD-MCNC: 149 MG/DL (ref 70–100)
GLUCOSE BLDC GLUCOMTR-MCNC: 192 MG/DL (ref 70–130)
GLUCOSE UR STRIP-MCNC: ABNORMAL MG/DL
HCT VFR BLD AUTO: 24.7 % (ref 34.5–44)
HGB BLD-MCNC: 7.8 G/DL (ref 11.5–15.5)
HGB UR QL STRIP.AUTO: ABNORMAL
HOLD SPECIMEN: NORMAL
HOLD SPECIMEN: NORMAL
IMM GRANULOCYTES # BLD: 0.06 10*3/MM3 (ref 0–0.03)
IMM GRANULOCYTES NFR BLD: 0.4 % (ref 0–0.6)
KETONES UR QL STRIP: NEGATIVE
LEUKOCYTE ESTERASE UR QL STRIP.AUTO: ABNORMAL
LYMPHOCYTES # BLD AUTO: 1.65 10*3/MM3 (ref 0.6–4.8)
LYMPHOCYTES NFR BLD AUTO: 10.4 % (ref 24–44)
MAGNESIUM SERPL-MCNC: 1.2 MG/DL (ref 1.3–2.7)
MCH RBC QN AUTO: 27.8 PG (ref 27–31)
MCHC RBC AUTO-ENTMCNC: 31.6 G/DL (ref 32–36)
MCV RBC AUTO: 87.9 FL (ref 80–99)
MONOCYTES # BLD AUTO: 0.75 10*3/MM3 (ref 0–1)
MONOCYTES NFR BLD AUTO: 4.7 % (ref 0–12)
NEUTROPHILS # BLD AUTO: 13.22 10*3/MM3 (ref 1.5–8.3)
NEUTROPHILS NFR BLD AUTO: 83.8 % (ref 41–71)
NITRITE UR QL STRIP: NEGATIVE
PH UR STRIP.AUTO: 5.5 [PH] (ref 5–8)
PLATELET # BLD AUTO: 328 10*3/MM3 (ref 150–450)
PMV BLD AUTO: 9.2 FL (ref 6–12)
POTASSIUM BLD-SCNC: 3 MMOL/L (ref 3.5–5.5)
PROT SERPL-MCNC: 7.8 G/DL (ref 5.7–8.2)
PROT UR QL STRIP: ABNORMAL
RBC # BLD AUTO: 2.81 10*6/MM3 (ref 3.89–5.14)
RBC # UR: ABNORMAL /HPF
REF LAB TEST METHOD: ABNORMAL
RH BLD: POSITIVE
SODIUM BLD-SCNC: 138 MMOL/L (ref 132–146)
SP GR UR STRIP: 1.02 (ref 1–1.03)
SQUAMOUS #/AREA URNS HPF: ABNORMAL /HPF
TROPONIN I SERPL-MCNC: 0.02 NG/ML (ref 0–0.07)
UROBILINOGEN UR QL STRIP: ABNORMAL
WBC NRBC COR # BLD: 15.79 10*3/MM3 (ref 3.5–10.8)
WBC UR QL AUTO: ABNORMAL /HPF
WHOLE BLOOD HOLD SPECIMEN: NORMAL
WHOLE BLOOD HOLD SPECIMEN: NORMAL

## 2017-10-04 PROCEDURE — 86900 BLOOD TYPING SEROLOGIC ABO: CPT | Performed by: EMERGENCY MEDICINE

## 2017-10-04 PROCEDURE — 87040 BLOOD CULTURE FOR BACTERIA: CPT | Performed by: EMERGENCY MEDICINE

## 2017-10-04 PROCEDURE — 81001 URINALYSIS AUTO W/SCOPE: CPT | Performed by: EMERGENCY MEDICINE

## 2017-10-04 PROCEDURE — 25010000002 PIPERACILLIN-TAZOBACTAM: Performed by: EMERGENCY MEDICINE

## 2017-10-04 PROCEDURE — 87077 CULTURE AEROBIC IDENTIFY: CPT | Performed by: EMERGENCY MEDICINE

## 2017-10-04 PROCEDURE — 80053 COMPREHEN METABOLIC PANEL: CPT | Performed by: EMERGENCY MEDICINE

## 2017-10-04 PROCEDURE — 83880 ASSAY OF NATRIURETIC PEPTIDE: CPT | Performed by: EMERGENCY MEDICINE

## 2017-10-04 PROCEDURE — 78582 LUNG VENTILAT&PERFUS IMAGING: CPT

## 2017-10-04 PROCEDURE — 84484 ASSAY OF TROPONIN QUANT: CPT

## 2017-10-04 PROCEDURE — 82962 GLUCOSE BLOOD TEST: CPT

## 2017-10-04 PROCEDURE — 87086 URINE CULTURE/COLONY COUNT: CPT | Performed by: EMERGENCY MEDICINE

## 2017-10-04 PROCEDURE — 85379 FIBRIN DEGRADATION QUANT: CPT | Performed by: EMERGENCY MEDICINE

## 2017-10-04 PROCEDURE — 87150 DNA/RNA AMPLIFIED PROBE: CPT | Performed by: EMERGENCY MEDICINE

## 2017-10-04 PROCEDURE — 63710000001 INSULIN LISPRO (HUMAN) PER 5 UNITS: Performed by: NURSE PRACTITIONER

## 2017-10-04 PROCEDURE — 93005 ELECTROCARDIOGRAM TRACING: CPT | Performed by: EMERGENCY MEDICINE

## 2017-10-04 PROCEDURE — 25010000002 HEPARIN (PORCINE) PER 1000 UNITS: Performed by: NURSE PRACTITIONER

## 2017-10-04 PROCEDURE — 87186 SC STD MICRODIL/AGAR DIL: CPT | Performed by: EMERGENCY MEDICINE

## 2017-10-04 PROCEDURE — 86923 COMPATIBILITY TEST ELECTRIC: CPT

## 2017-10-04 PROCEDURE — 86850 RBC ANTIBODY SCREEN: CPT | Performed by: EMERGENCY MEDICINE

## 2017-10-04 PROCEDURE — 99222 1ST HOSP IP/OBS MODERATE 55: CPT | Performed by: FAMILY MEDICINE

## 2017-10-04 PROCEDURE — 25010000002 CEFTRIAXONE PER 250 MG: Performed by: EMERGENCY MEDICINE

## 2017-10-04 PROCEDURE — 85025 COMPLETE CBC W/AUTO DIFF WBC: CPT | Performed by: EMERGENCY MEDICINE

## 2017-10-04 PROCEDURE — 87147 CULTURE TYPE IMMUNOLOGIC: CPT | Performed by: EMERGENCY MEDICINE

## 2017-10-04 PROCEDURE — 99285 EMERGENCY DEPT VISIT HI MDM: CPT

## 2017-10-04 PROCEDURE — 71010 HC CHEST PA OR AP: CPT

## 2017-10-04 PROCEDURE — 25010000002 VANCOMYCIN HCL IN NACL 2-0.9 GM/500ML-% SOLUTION: Performed by: EMERGENCY MEDICINE

## 2017-10-04 PROCEDURE — 86901 BLOOD TYPING SEROLOGIC RH(D): CPT | Performed by: EMERGENCY MEDICINE

## 2017-10-04 PROCEDURE — 83605 ASSAY OF LACTIC ACID: CPT | Performed by: EMERGENCY MEDICINE

## 2017-10-04 PROCEDURE — P9612 CATHETERIZE FOR URINE SPEC: HCPCS

## 2017-10-04 PROCEDURE — 83735 ASSAY OF MAGNESIUM: CPT | Performed by: EMERGENCY MEDICINE

## 2017-10-04 RX ORDER — ONDANSETRON 4 MG/1
4 TABLET, FILM COATED ORAL EVERY 6 HOURS PRN
Status: DISCONTINUED | OUTPATIENT
Start: 2017-10-04 | End: 2017-10-12 | Stop reason: HOSPADM

## 2017-10-04 RX ORDER — DEXTROSE MONOHYDRATE 25 G/50ML
25 INJECTION, SOLUTION INTRAVENOUS
Status: DISCONTINUED | OUTPATIENT
Start: 2017-10-04 | End: 2017-10-12 | Stop reason: HOSPADM

## 2017-10-04 RX ORDER — POTASSIUM CHLORIDE 1.5 G/1.77G
40 POWDER, FOR SOLUTION ORAL AS NEEDED
Status: DISCONTINUED | OUTPATIENT
Start: 2017-10-04 | End: 2017-10-12 | Stop reason: HOSPADM

## 2017-10-04 RX ORDER — ACETAMINOPHEN 325 MG/1
650 TABLET ORAL EVERY 4 HOURS PRN
Status: DISCONTINUED | OUTPATIENT
Start: 2017-10-04 | End: 2017-10-12 | Stop reason: HOSPADM

## 2017-10-04 RX ORDER — MAGNESIUM SULFATE HEPTAHYDRATE 40 MG/ML
2 INJECTION, SOLUTION INTRAVENOUS AS NEEDED
Status: DISCONTINUED | OUTPATIENT
Start: 2017-10-04 | End: 2017-10-12 | Stop reason: HOSPADM

## 2017-10-04 RX ORDER — POTASSIUM CHLORIDE 750 MG/1
40 CAPSULE, EXTENDED RELEASE ORAL AS NEEDED
Status: DISCONTINUED | OUTPATIENT
Start: 2017-10-04 | End: 2017-10-12 | Stop reason: HOSPADM

## 2017-10-04 RX ORDER — AMLODIPINE BESYLATE 5 MG/1
5 TABLET ORAL DAILY
Status: DISCONTINUED | OUTPATIENT
Start: 2017-10-05 | End: 2017-10-12 | Stop reason: HOSPADM

## 2017-10-04 RX ORDER — MULTIPLE VITAMINS W/ MINERALS TAB 9MG-400MCG
1 TAB ORAL DAILY
Status: DISCONTINUED | OUTPATIENT
Start: 2017-10-05 | End: 2017-10-12 | Stop reason: HOSPADM

## 2017-10-04 RX ORDER — ONDANSETRON 2 MG/ML
4 INJECTION INTRAMUSCULAR; INTRAVENOUS EVERY 6 HOURS PRN
Status: DISCONTINUED | OUTPATIENT
Start: 2017-10-04 | End: 2017-10-12 | Stop reason: HOSPADM

## 2017-10-04 RX ORDER — SODIUM CHLORIDE 0.9 % (FLUSH) 0.9 %
10 SYRINGE (ML) INJECTION AS NEEDED
Status: DISCONTINUED | OUTPATIENT
Start: 2017-10-04 | End: 2017-10-05

## 2017-10-04 RX ORDER — POTASSIUM CHLORIDE 7.45 MG/ML
10 INJECTION INTRAVENOUS
Status: DISCONTINUED | OUTPATIENT
Start: 2017-10-04 | End: 2017-10-12 | Stop reason: HOSPADM

## 2017-10-04 RX ORDER — SODIUM CHLORIDE 0.9 % (FLUSH) 0.9 %
1-10 SYRINGE (ML) INJECTION AS NEEDED
Status: DISCONTINUED | OUTPATIENT
Start: 2017-10-04 | End: 2017-10-12 | Stop reason: HOSPADM

## 2017-10-04 RX ORDER — SODIUM CHLORIDE AND POTASSIUM CHLORIDE 150; 900 MG/100ML; MG/100ML
100 INJECTION, SOLUTION INTRAVENOUS CONTINUOUS
Status: DISCONTINUED | OUTPATIENT
Start: 2017-10-04 | End: 2017-10-04

## 2017-10-04 RX ORDER — MAGNESIUM SULFATE HEPTAHYDRATE 40 MG/ML
4 INJECTION, SOLUTION INTRAVENOUS AS NEEDED
Status: DISCONTINUED | OUTPATIENT
Start: 2017-10-04 | End: 2017-10-12 | Stop reason: HOSPADM

## 2017-10-04 RX ORDER — ATORVASTATIN CALCIUM 10 MG/1
10 TABLET, FILM COATED ORAL NIGHTLY
Status: DISCONTINUED | OUTPATIENT
Start: 2017-10-04 | End: 2017-10-12 | Stop reason: HOSPADM

## 2017-10-04 RX ORDER — SIMETHICONE 80 MG
80 TABLET,CHEWABLE ORAL EVERY 6 HOURS PRN
Status: DISCONTINUED | OUTPATIENT
Start: 2017-10-04 | End: 2017-10-12 | Stop reason: HOSPADM

## 2017-10-04 RX ORDER — ISOSORBIDE MONONITRATE 60 MG/1
60 TABLET, EXTENDED RELEASE ORAL DAILY
Status: DISCONTINUED | OUTPATIENT
Start: 2017-10-05 | End: 2017-10-12 | Stop reason: HOSPADM

## 2017-10-04 RX ORDER — MAGNESIUM SULFATE 1 G/100ML
1 INJECTION INTRAVENOUS AS NEEDED
Status: DISCONTINUED | OUTPATIENT
Start: 2017-10-04 | End: 2017-10-12 | Stop reason: HOSPADM

## 2017-10-04 RX ORDER — SACCHAROMYCES BOULARDII 250 MG
250 CAPSULE ORAL 2 TIMES DAILY
Status: DISCONTINUED | OUTPATIENT
Start: 2017-10-04 | End: 2017-10-12

## 2017-10-04 RX ORDER — NICOTINE POLACRILEX 4 MG
15 LOZENGE BUCCAL
Status: DISCONTINUED | OUTPATIENT
Start: 2017-10-04 | End: 2017-10-12 | Stop reason: HOSPADM

## 2017-10-04 RX ORDER — HEPARIN SODIUM 5000 [USP'U]/ML
5000 INJECTION, SOLUTION INTRAVENOUS; SUBCUTANEOUS EVERY 8 HOURS SCHEDULED
Status: DISCONTINUED | OUTPATIENT
Start: 2017-10-04 | End: 2017-10-12 | Stop reason: HOSPADM

## 2017-10-04 RX ORDER — ESCITALOPRAM OXALATE 10 MG/1
10 TABLET ORAL DAILY
Status: DISCONTINUED | OUTPATIENT
Start: 2017-10-05 | End: 2017-10-12 | Stop reason: HOSPADM

## 2017-10-04 RX ORDER — CLONAZEPAM 0.5 MG/1
0.25 TABLET ORAL 2 TIMES DAILY PRN
Status: DISCONTINUED | OUTPATIENT
Start: 2017-10-04 | End: 2017-10-12 | Stop reason: HOSPADM

## 2017-10-04 RX ORDER — VANCOMYCIN 2 GRAM/500 ML IN 0.9 % SODIUM CHLORIDE INTRAVENOUS
2000 ONCE
Status: COMPLETED | OUTPATIENT
Start: 2017-10-04 | End: 2017-10-04

## 2017-10-04 RX ORDER — CEFTRIAXONE SODIUM 1 G/50ML
1 INJECTION, SOLUTION INTRAVENOUS ONCE
Status: COMPLETED | OUTPATIENT
Start: 2017-10-04 | End: 2017-10-04

## 2017-10-04 RX ORDER — HYDROCODONE BITARTRATE AND ACETAMINOPHEN 5; 325 MG/1; MG/1
1 TABLET ORAL EVERY 4 HOURS PRN
Status: DISCONTINUED | OUTPATIENT
Start: 2017-10-04 | End: 2017-10-12 | Stop reason: HOSPADM

## 2017-10-04 RX ADMIN — HYDRALAZINE HYDROCHLORIDE 75 MG: 25 TABLET ORAL at 21:33

## 2017-10-04 RX ADMIN — ATORVASTATIN CALCIUM 10 MG: 10 TABLET, FILM COATED ORAL at 21:33

## 2017-10-04 RX ADMIN — TAZOBACTAM SODIUM AND PIPERACILLIN SODIUM 4.5 G: .5; 4 INJECTION, POWDER, LYOPHILIZED, FOR SOLUTION INTRAVENOUS at 16:53

## 2017-10-04 RX ADMIN — HYDROCODONE BITARTATE AND ACETAMINOPHEN 1 TABLET: 5; 325 TABLET ORAL at 22:20

## 2017-10-04 RX ADMIN — INSULIN LISPRO 2 UNITS: 100 INJECTION, SOLUTION INTRAVENOUS; SUBCUTANEOUS at 21:34

## 2017-10-04 RX ADMIN — Medication 250 MG: at 21:33

## 2017-10-04 RX ADMIN — Medication 125 MG: at 17:11

## 2017-10-04 RX ADMIN — SODIUM CHLORIDE 4770 ML: 9 INJECTION, SOLUTION INTRAVENOUS at 15:53

## 2017-10-04 RX ADMIN — CEFTRIAXONE SODIUM 1 G: 1 INJECTION, SOLUTION INTRAVENOUS at 14:18

## 2017-10-04 RX ADMIN — POTASSIUM CHLORIDE 40 MEQ: 750 CAPSULE, EXTENDED RELEASE ORAL at 21:33

## 2017-10-04 RX ADMIN — HEPARIN SODIUM 5000 UNITS: 5000 INJECTION, SOLUTION INTRAVENOUS; SUBCUTANEOUS at 22:39

## 2017-10-04 RX ADMIN — Medication 2000 MG: at 15:58

## 2017-10-04 NOTE — PROGRESS NOTES
Pharmacy note vancomycin  Vancomycin for uti; target trough: 10-15  Patient age: 67; weight: 159 kg; height: 65 in; scr: 1.5 mg/dl; est crcl: 55-60 ml/min  Patient received vancomycin 2000 mg(dose 13 mg/kg) in er this afternoon  Will start patient on 1500 mg(10 mg/kg) iv daily starting 10/5 @ noon  Will check vancomycin trough prior to third total dose on Friday(10/6)  Micheal Costa [facility]

## 2017-10-04 NOTE — H&P
[facility] Medicine Services  HISTORY AND PHYSICAL    Primary Care Physician: Trevon Delarosa MD    Subjective     Chief Complaint:  Diarrhea, weakness, fatigue    History of Present Illness:     Ms. Bueno is a 67 AA female with pmh significant for  hypertension, diabetes, osteoarthritis, asthma, renal failure, and anxiety.  She's had 3 recent hospitalizations in August.  She was admitted from 8/3-8/12/17 with renal failure and status post renal biopsy.  She was discharged to rehabilitation with continued follow-up with nephrology.  During that hospitalization, offered colonoscopy for further workup of anemia and patient declined at that time.  She was also treated with Rocephin for Escherichia coli UTI.  Patient was admitted from rehabilitation on 8/23-8/30/17 with increased weakness.  During that hospitalization, patient continued to have anemia requiring blood and iron transfusion.  Patient had EGD on 8/25/17 that showed reactive gastropathy with minimal chronic gastritis.  Carlos george had a colonoscopy on 8/28 that showed scattered diverticula and grade 1 internal hemorrhoids.  Nephrology followed during both hospitalizations and patient was discharged to [facility] for rehabilitation. Is readmitted from 9/2/2017 through 9/7/2017 with right middle lobe/right lower lobe pneumonia and right pleural effusion with acute on chronic respiratory failure and hypoxia.  She was given Levaquin therapy and was discharged back to [facility] on oral antibiotics.  Patient was released approximately 1 week ago and states during this time at home she's become progressively weak with increasing lower extremity pain and edema.    Patient states that she worked with physical therapy and occupational therapy while at skilled nursing facility however was not extremely mobile during this time.  Since returning home, she's had help with daily activities and has not left her recliner in approximately  7 days.  Patient states she is having to wear depends because she is unable to walk to the bathroom due to weakness and pain in the lower extremities.  She states left lower extremity is significantly worse than the right.  Patient is having trouble standing due to pain and weakness.  She has not had any falls.  Denies fever, chills, sweats.  Patient states she is slightly more short of breath in usual which is not terribly noticeable at rest however does notice upon standing from recliner.  Patient denies dysuria, frequency, abdominal pain, nausea or vomiting but states she's noticed several episodes of watery diarrhea since 2017.    ED evaluation reveals troponin 0.02, , potassium 3.0, creatinine 1.5 (previous 2.2), magnesium 1.2, d-dimer 7.5, WBCs 15.7, hemoglobin 7.8, hematocrit 24.7.  Chest x-ray stable from last admission, positive UTI with hematuria.  Patient to be admitted to hospital medicine service for further management.    Review of Systems   Constitutional: Positive for activity change and fatigue. Negative for fever.   HENT: Negative.    Eyes: Negative.    Respiratory: Positive for shortness of breath. Negative for cough and wheezing.    Cardiovascular: Positive for leg swelling. Negative for chest pain and palpitations.   Gastrointestinal: Positive for diarrhea. Negative for abdominal pain, nausea and vomiting.   Endocrine: Negative.    Genitourinary: Negative for decreased urine volume, difficulty urinating and dysuria.   Musculoskeletal: Positive for gait problem and myalgias.   Skin: Negative.    Neurological: Positive for weakness.   Psychiatric/Behavioral: Negative.       Otherwise complete 10 system ROS performed and negative except as mentioned in the HPI.    Past Medical History:   Diagnosis Date   • Anemia    • Anxiety    • Asthma    • Diabetes mellitus    • Hypertension    • Morbid obesity    • Osteoarthritis        Past Surgical History:   Procedure Laterality Date   •   "SECTION     • COLONOSCOPY N/A 8/29/2017    Procedure: COLONOSCOPY;  Surgeon: Mark I Brunner, MD;  Location:  [facility];  Service:    • ENDOSCOPY N/A 8/25/2017    Procedure: ESOPHAGOGASTRODUODENOSCOPY ;  Surgeon: Velasquez Call MD;  Location:  [facility];  Service:    • HERNIA REPAIR         History reviewed. No pertinent family history.    Social History     Social History   • Marital status:      Spouse name: N/A   • Number of children: N/A   • Years of education: N/A     Occupational History   • Not on file.     Social History Main Topics   • Smoking status: Former Smoker   • Smokeless tobacco: Not on file   • Alcohol use No   • Drug use: No   • Sexual activity: No     Other Topics Concern   • Not on file     Social History Narrative    lIVES ALONE.  PATIENT STATES SHE HAS HOME HEALTH COME IN.       Medications:    (Not in a hospital admission)    Allergies:  No Known Allergies      Objective     Physical Exam:  Vital Signs: /61  Pulse 96  Temp 97.5 °F (36.4 °C) (Oral)   Resp 24  Ht 65\" (165.1 cm)  Wt (!) 350 lb (159 kg)  SpO2 96%  BMI 58.24 kg/m2  Physical Exam   Constitutional: She is oriented to person, place, and time. She appears well-developed and well-nourished. No distress.   HENT:   Head: Normocephalic and atraumatic.   Dry mucous membranes   Eyes: Pupils are equal, round, and reactive to light. No scleral icterus.   Neck: Normal range of motion. No JVD present.   Cardiovascular: Normal rate, regular rhythm, normal heart sounds and intact distal pulses.    Pulmonary/Chest: Effort normal. No respiratory distress. She has no wheezes. She has rales (bilateral bases).   Abdominal: Soft. Bowel sounds are normal. She exhibits no distension. There is no tenderness. There is no guarding.   Musculoskeletal: Normal range of motion. She exhibits edema (bilateral pitting edema 2+- asymmetric calves with left being larger) and tenderness.   Neurological: She is alert and oriented to " person, place, and time.   Skin: Skin is warm and dry. No erythema.   Psychiatric: She has a normal mood and affect. Her behavior is normal. Judgment and thought content normal.           Results Reviewed:    Results from last 7 days  Lab Units 10/04/17  1338   WBC 10*3/mm3 15.79*   HEMOGLOBIN g/dL 7.8*   PLATELETS 10*3/mm3 328       Results from last 7 days  Lab Units 10/04/17  1338   SODIUM mmol/L 138   POTASSIUM mmol/L 3.0*   CO2 mmol/L 31.0   CREATININE mg/dL 1.50*   GLUCOSE mg/dL 149*   CALCIUM mg/dL 8.7       I have personally reviewed and interpreted available lab data, radiology studies and ECG obtained at time of admission.     Assessment / Plan     Problem List:   Hospital Problem List     * (Principal)Acute cystitis with hematuria    Fatigue    Leukocytosis    Diarrhea    Bilateral leg pain    Hypokalemia    Hypomagnesemia    Asthma    Diabetes mellitus    Hypertension    Morbid obesity    Anxiety    Anemia    Overview Addendum 8/28/2017 11:03 AM by Piyush Stokes MD     Iron deficient         CKD (chronic kidney disease) Stage 3    Overview Signed 8/28/2017 11:03 AM by Piyush Stokes MD     Followed by nephrology associates (improving)         Gastritis    Overview Signed 8/28/2017 11:05 AM by Piyush Stokes MD     Biopsy negative for h.pylori         Urinary tract infection associated with indwelling urethral catheter          Assessment:    Plan:  UTI  -- continue vanc/ zosyn started in ED  -- continue mendieta for incontinence (functional) with diarrhea  -- blood and urine cx pending    Diarrhea  -- recent history of several antibiotics over last 3 months  -- cover for c diff with oral vanc qid emperically  -- stool studies pending    Weakness/ fatigue/ bilateral leg pain  -- increasing fatigue. On review, renal function improved as well as h/h that is stable  -- suspect weakness fatigue r/t acute infectious etiology  -- patient not left recliner since returning home from [facility]  over 1 week ago and fairly immobile at SNF     Check bilateral Venous duplex and VQ scan for ongoing soa (slightly worse than baseline)    Electrolyte derrangement  -- replete and recheck    Accelerated HTN  -- continue home meds, hold lasix in interim for rehydration    Chronic anemia- iron deficient  -- monitor, suspect dilutional drop with IVFs  -- obtain type and screen. Transfuse <7 or symptomatic    T2 DM  -- ssi qid with fsbs  -- check a1c  -- not used insulin since discharge from SNF as has no glucometer at home (lost). Consult CM for supplies    CKD  -- followed by [facility]  -- improvement since prior admissions, monitor      DVT prophylaxis:heparin sq  Code Status: conditional  Admission Status: Patient will be admitted to [facility]     Sabra Guerra, APRN 10/04/17 5:29 PM          I seen and evaluated the patient.  I've performed a independent history and physical examination.  I have reviewed the above assessment and plan which I agree with.    Fernandez Noe, DO  9:31 PM  10/4/17

## 2017-10-04 NOTE — ED PROVIDER NOTES
"Subjective   HPI Comments: Joy Bueno is a pleasant, 67 y.o.female who presents to the ED with c/o a three month history of worsening fatigue. She has not been out of her house to buy groceries since the start of her decline in July. She still lives at home by herself with occasional help from her family. She came to the ED for evaluation when she began feeling significantly worse than baseline last night. Upon arrival, the patient reports that she has had two or three episodes of watery diarrhea a day since 09/28/2017. She also notes difficulty ambulating secondary to leg pain. She is on home oxygen as needed. She was on CPAP but discontinued use \"a long time ago\". She denies abdominal pain, rhinorrhea, cough, or any other acute complaints at this time.    Patient is a 67 y.o. female presenting with fatigue.   History provided by:  Patient  Fatigue   Location:  General  Severity:  Moderate  Onset quality:  Gradual  Duration:  3 months  Timing:  Constant  Progression:  Worsening  Chronicity:  New  Context:   She has not been out of her house to buy groceries since the start of her decline in July. She still lives at home by herself with occasional help from her family. She came to the ED for evaluation when she began feeling significantly worse than baseline last night  Relieved by:  Nothing  Worsened by:  Nothing  Ineffective treatments:  None tried  Associated symptoms: diarrhea and fatigue    Associated symptoms: no abdominal pain, no chest pain, no congestion, no cough, no fever, no headaches, no nausea, no rhinorrhea, no shortness of breath, no sore throat and no vomiting    Diarrhea:     Quality:  Watery    Number of occurrences:  2 or 3 per day    Severity:  Moderate    Duration:  6 days    Timing:  Intermittent    Progression:  Unchanged  Fatigue:     Severity:  Moderate    Duration:  3 months    Timing:  Constant    Progression:  Worsening      Review of Systems   Constitutional: Positive for activity " change (decreased) and fatigue. Negative for chills and fever.   HENT: Negative for congestion, rhinorrhea, sore throat and trouble swallowing.    Respiratory: Negative for cough and shortness of breath.    Cardiovascular: Negative for chest pain.   Gastrointestinal: Positive for diarrhea. Negative for abdominal pain, nausea and vomiting.   Musculoskeletal: Positive for gait problem. Negative for back pain and neck pain.   Neurological: Negative for dizziness, weakness and headaches.   All other systems reviewed and are negative.      Past Medical History:   Diagnosis Date   • Anemia    • Anxiety    • Asthma    • Diabetes mellitus    • Hypertension    • Morbid obesity    • Osteoarthritis      Discharge Summary from 09/07/2017:    History of Present Illness on Day of Discharge:      Hospital Course  Patient is a 67-year-old -American female with past medical history of hypertension, diabetes, osteoarthritis, asthma, renal failure, and anxiety.  She's had 2 recent hospitalizations in August.  She was admitted from 8/3-8/12/17 with renal failure and status post renal biopsy.  She was discharged to rehabilitation with continued follow-up with nephrology.  During that hospitalization, offered colonoscopy for further workup of anemia and patient declined at that time.  She was also treated with Rocephin for Escherichia coli UTI.  Patient was admitted from rehabilitation on 8/23-8/30/17 with increased weakness.  During that hospitalization, patient continued to have anemia requiring blood and iron transfusion.  Patient had EGD on 8/25/17 that showed reactive gastropathy with minimal chronic gastritis.  Sh ulcere had a colonoscopy on 8/28 that showed scattered diverticula and grade 1 internal hemorrhoids.  Nephrology followed during both hospitalizations and patient was discharged to [facility] for rehabilitation.  Patient reports feeling well  will at rehabilitation until 0300 this morning she felt short of  breath and attributed to a panic attack.  When she woke up this morning and continued to feel significantly short of breath she asked them to call EMS because she knew it wasn't a panic attack.  Patient was transported to [facility] ED.  Patient does wear home oxygen at reported 4 L although patient was unable to tell me how much oxygen she wears.  Patient also complains of productive cough with whitish, clear phlegm.  She denies chest pain, abdominal pain, or palpitations.  She reports no difficulty urinating and had BM today and denies noting any blood.  She does wear depends and is incontinent of urine.  She reports at rehabilitation she has been able to stand only but has not been able to walk due to fatigue and weakness.  Evaluation in ED showed WBC 12.76.  Creatinine 2.10 which appears to be her baseline.  H/H was 8.3/28.5.  .  Troponin levels normal.  CT chest showed sizable right chest effusion with consolidative disease in mid and lower lung zones.  Small left base effusion.  Mild interstitial groundglass disease.  Four chamber cardiomegaly without pericardial effusion.  Patient was given IV antibiotics for possible pneumonia.  Patient will be admitted by the hospital medicine service for further evaluation and management.     Acute on chronic hypoxic respiratory failure  - improved, currently on 2 liters NC  - secondary to possible pneumonia, volume overload, pleural effusion, asthma.  - change back to oral diuretics  - nebs prn     Possible RML/RLL pneumonia  - continue levaquin to complete 10 days  - attempted thoracentesis but unable to obtain due to body habitus  - cultures no growth to date  - suggest repeat CT chest without contrast in one month.     CKDIII  - creatinine near baseline  - avoid NSAIDS or other nephrotoxins  - [facility] two weeks (may already have an appointment from prior discharge)  - suggest weekly BMP while at rehab     Anemia  - iron deficient and  "secondary to CKD  - received IV iron last admission, may need EPO  - [facility]  - suggest weekly CBC while at rehab.     Pleural effusion  - restart diuretics, continue antibiotics (levaquin) to complete 10 days therapy  - repeat CT chest one month     Morbid Obesity     Generalized weakness  - PT/OT/Mobilize     Patient needs PCP in [place]. Suggest referral to a new PCP to be scheduled one week post rehab discharge.        Procedures Performed           Consults:   Consults     Date and Time Order Name Status Description     2017 1735 Inpatient Consult to Nephrology Completed       2017 1018 Inpatient Consult to Gastroenterology Completed              Pertinent Test Results:     Condition on Discharge:  fair     Physical Exam on Discharge:/81  Pulse 94  Temp 99.2 °F (37.3 °C) (Oral)   Resp 20  Ht 65\" (165.1 cm)  Wt (!) 389 lb 1.6 oz (176 kg)  SpO2 98%  BMI 64.75 kg/m2  Physical Exam  Gen-no acute distress, non toxic, in bed  HEENT-NCAT, oropharynx clear  CV-RRR, S1 S2 normal, no m/r/g  Resp-grossly clear bilaterally, no wheezes  Abd-soft, non-tender, non-distended, normo active bowel sounds, morbidly obese  Ext-No lower extremity cyanosis, clubbing but 1+ edema bilaterally  Neuro-alert and oriented x 3, speech clear, moves all extremities (wiggles toes on request)  Psych-flat affect   Skin- No rash on exposed UE or LE bilaterally      No Known Allergies    Past Surgical History:   Procedure Laterality Date   •  SECTION     • COLONOSCOPY N/A 2017    Procedure: COLONOSCOPY;  Surgeon: Mark I Brunner, MD;  Location:  [facility];  Service:    • ENDOSCOPY N/A 2017    Procedure: ESOPHAGOGASTRODUODENOSCOPY ;  Surgeon: Velasquez Call MD;  Location:  [facility];  Service:    • HERNIA REPAIR         History reviewed. No pertinent family history.    Social History     Social History   • Marital status:      Spouse name: N/A   • Number " of children: N/A   • Years of education: N/A     Social History Main Topics   • Smoking status: Former Smoker   • Smokeless tobacco: None   • Alcohol use No   • Drug use: No   • Sexual activity: No     Other Topics Concern   • None     Social History Narrative    lIVES ALONE.  PATIENT STATES SHE HAS HOME HEALTH COME IN.         Objective   Physical Exam   Constitutional: She is oriented to person, place, and time. She appears well-developed and well-nourished. No distress.   HENT:   Head: Normocephalic and atraumatic.   Nose: Nose normal.   Mouth/Throat: Oropharynx is clear and moist.   Eyes: Conjunctivae are normal. No scleral icterus.   Neck: Normal range of motion. Neck supple.   Cardiovascular: Normal rate, regular rhythm and normal heart sounds.    No murmur heard.  Distant heart sounds.   Pulmonary/Chest: Effort normal. No respiratory distress. She has decreased breath sounds. She has no wheezes. She has no rales.   Abdominal: Soft. Bowel sounds are normal. She exhibits no mass. There is no tenderness. There is no rebound and no guarding.   Morbidly obese. Abdomen is soft and non-tender diffusely. Bowel sounds are normal in all four quadrants. No guarding or rebound.   Musculoskeletal: Normal range of motion. She exhibits edema.   Large legs with edema. No evidence of cellulitis   Neurological: She is alert and oriented to person, place, and time.   Global weakness.   Skin: Skin is warm and dry. No erythema.   Psychiatric: She has a normal mood and affect. Her behavior is normal.   Nursing note and vitals reviewed.      Procedures         ED Course  ED Course   Comment By Time   Dr. Guzman discussed the case in detail with the hospitalist who will admit. Valeria Tello 10/04 1529     Recent Results (from the past 24 hour(s))   POC Glucose Fingerstick    Collection Time: 10/08/17  7:35 AM   Result Value Ref Range    Glucose 120 70 - 130 mg/dL   Basic Metabolic Panel    Collection Time: 10/08/17  8:34 AM    Result Value Ref Range    Glucose 122 (H) 70 - 100 mg/dL    BUN 42 (H) 9 - 23 mg/dL    Creatinine 2.10 (H) 0.60 - 1.30 mg/dL    Sodium 139 132 - 146 mmol/L    Potassium 4.0 3.5 - 5.5 mmol/L    Chloride 106 99 - 109 mmol/L    CO2 26.0 20.0 - 31.0 mmol/L    Calcium 8.3 (L) 8.7 - 10.4 mg/dL    eGFR  African Amer 29 (L) >60 mL/min/1.73    BUN/Creatinine Ratio 20.0 7.0 - 25.0    Anion Gap 7.0 3.0 - 11.0 mmol/L   Hemoglobin & Hematocrit, Blood    Collection Time: 10/08/17  8:34 AM   Result Value Ref Range    Hemoglobin 8.0 (L) 11.5 - 15.5 g/dL    Hematocrit 25.9 (L) 34.5 - 44.0 %   POC Glucose Fingerstick    Collection Time: 10/08/17 11:37 AM   Result Value Ref Range    Glucose 180 (H) 70 - 130 mg/dL   Urinalysis With / Culture If Indicated - Urine, Clean Catch    Collection Time: 10/08/17 12:01 PM   Result Value Ref Range    Color, UA Yellow Yellow, Straw    Appearance, UA Cloudy (A) Clear    pH, UA 5.5 5.0 - 8.0    Specific Gravity, UA 1.019 1.001 - 1.030    Glucose, UA Negative Negative    Ketones, UA Negative Negative    Bilirubin, UA Negative Negative    Blood, UA Negative Negative    Protein, UA >=300 mg/dL (3+) (A) Negative    Leuk Esterase, UA Negative Negative    Nitrite, UA Negative Negative    Urobilinogen, UA 0.2 E.U./dL 0.2 - 1.0 E.U./dL   Sodium, Urine, Random - Urine, Clean Catch    Collection Time: 10/08/17 12:01 PM   Result Value Ref Range    Sodium, Urine 46 30 - 90 mmol/L   Creatinine, Urine, Random - Urine, Clean Catch    Collection Time: 10/08/17 12:01 PM   Result Value Ref Range    Creatinine, Urine 63.1 mg/dL   Urinalysis, Microscopic Only - Urine, Clean Catch    Collection Time: 10/08/17 12:01 PM   Result Value Ref Range    RBC, UA 0-2 None Seen, 0-2 /HPF    WBC, UA 13-20 (A) None Seen /HPF    Bacteria, UA None Seen None Seen, Trace /HPF    Squamous Epithelial Cells, UA 3-6 (A) None Seen, 0-2 /HPF    Hyaline Casts, UA 0-6 0 - 6 /LPF    Coarse Granular Casts, UA 3-6 None Seen /LPF    Methodology  Manual Light Microscopy    POC Glucose Fingerstick    Collection Time: 10/08/17  4:29 PM   Result Value Ref Range    Glucose 167 (H) 70 - 130 mg/dL   POC Glucose Fingerstick    Collection Time: 10/08/17  9:09 PM   Result Value Ref Range    Glucose 173 (H) 70 - 130 mg/dL     Note: In addition to lab results from this visit, the labs listed above may include labs taken at another facility or during a different encounter within the last 24 hours. Please correlate lab times with ED admission and discharge times for further clarification of the services performed during this visit.    NM Lung Ventilation Perfusion   Final Result   Abnormal     Unremarkable study without evidence of pulmonary embolism.                 THIS DOCUMENT HAS BEEN ELECTRONICALLY SIGNED BY LETHA CAMPOVERDE MD      XR Chest 1 View   Final Result   Cardiomegaly with bibasilar airspace disease, slightly   improved when compared with 09/05/2017.       D:  10/04/2017   E:  10/04/2017       This report was finalized on 10/4/2017 3:40 PM by Dr. Nate Smith MD.            Vitals:    10/08/17 1531 10/08/17 1629 10/08/17 1933 10/08/17 1950   BP:  176/86  152/70   BP Location:  Right arm  Right arm   Patient Position:  Lying  Lying   Pulse: 86 88  93   Resp: 20 20 20 20   Temp:  98.1 °F (36.7 °C)  98.1 °F (36.7 °C)   TempSrc:  Temporal Artery   Oral   SpO2: 99% 98%  92%   Weight:       Height:         Medications   sodium chloride 0.9 % bolus 4,770 mL (4,770 mL Intravenous Handoff 10/4/17 1720)   acetaminophen (TYLENOL) tablet 650 mg (not administered)   amLODIPine (NORVASC) tablet 5 mg (5 mg Oral Given 10/8/17 0933)   atorvastatin (LIPITOR) tablet 10 mg (10 mg Oral Given 10/8/17 2118)   clonazePAM (KlonoPIN) tablet 0.25 mg (0.25 mg Oral Given 10/8/17 2121)   escitalopram (LEXAPRO) tablet 10 mg (10 mg Oral Given 10/8/17 0933)   hydrALAZINE (APRESOLINE) tablet 75 mg (75 mg Oral Given 10/8/17 2119)   HYDROcodone-acetaminophen (NORCO) 5-325 MG per tablet 1 tablet  (1 tablet Oral Given 10/8/17 2122)   isosorbide mononitrate (IMDUR) 24 hr tablet 60 mg (60 mg Oral Given 10/8/17 0933)   multivitamin with minerals 1 tablet (1 tablet Oral Given 10/8/17 0932)   phenylephrine-shark liver oil-mineral oil-petrolatum (PREPARATION H) 0.25-3-14-71.9 % rectal ointment (not administered)   saccharomyces boulardii (FLORASTOR) capsule 250 mg (250 mg Oral Given 10/8/17 1707)   simethicone (MYLICON) chewable tablet 80 mg (80 mg Oral Given 10/6/17 1703)   dextrose (GLUTOSE) oral gel 15 g (not administered)   dextrose (D50W) solution 25 g (not administered)   glucagon (GLUCAGEN) injection 1 mg (not administered)   sodium chloride 0.9 % flush 1-10 mL (not administered)   heparin (porcine) 5000 UNIT/ML injection 5,000 Units (5,000 Units Subcutaneous Given 10/8/17 2118)   potassium chloride (MICRO-K) CR capsule 40 mEq (40 mEq Oral Given 10/6/17 0024)     Or   potassium chloride (KLOR-CON) packet 40 mEq ( Oral Not Given:  See Alt 10/6/17 0024)     Or   potassium chloride 10 mEq in 100 mL IVPB ( Intravenous Not Given:  See Alt 10/6/17 0024)   magnesium sulfate 4 gram infusion- Mg less than or equal to 1 mg/dL ( Intravenous Not Given:  See Alt 10/6/17 0930)     Or   magnesium sulfate 3 gram infusion (1gm x 3) - Mg 1.1 - 1.5 mg/dL ( Intravenous Not Given:  See Alt 10/6/17 0930)     Or   Magnesium Sulfate 2 gram infusion- Mg 1.6 - 1.9 mg/dL (2 g Intravenous New Bag 10/6/17 0930)   insulin lispro (humaLOG) injection 0-7 Units (2 Units Subcutaneous Given 10/8/17 2122)   ondansetron (ZOFRAN) tablet 4 mg (not administered)     Or   ondansetron (ZOFRAN) injection 4 mg (not administered)   vancomycin oral solution 125 mg (125 mg Oral Given 10/8/17 1707)   cefTRIAXone (ROCEPHIN) IVPB 1 g (1 g Intravenous New Bag 10/8/17 4406)   castor oil-balsam peru (VENELEX) ointment ( Topical Given 10/8/17 3792)   pantoprazole (PROTONIX) EC tablet 40 mg (40 mg Oral Given 10/8/17 0972)   ipratropium-albuterol (DUO-NEB)  nebulizer solution 3 mL (3 mL Nebulization Given 10/8/17 1933)   albuterol (PROVENTIL) nebulizer solution 0.5% 2.5 mg/0.5mL (2.5 mg Nebulization Given 10/8/17 0025)   budesonide-formoterol (SYMBICORT) 160-4.5 MCG/ACT inhaler 2 puff ( Inhalation Canceled Entry 10/8/17 2130)   sodium chloride 0.9 % infusion (100 mL/hr Intravenous New Bag 10/8/17 0956)   cefTRIAXone (ROCEPHIN) IVPB 1 g (0 g Intravenous Stopped 10/4/17 1502)   vancomycin oral solution 125 mg (125 mg Oral Given 10/4/17 1711)   piperacillin-tazobactam (ZOSYN) 4.5 g/100 mL 0.9% NS IVPB (mbp) (4.5 g Intravenous New Bag 10/4/17 1653)   vancomycin IVPB 2000 mg in 0.9% Sodium Chloride (premix) 500 mL (2,000 mg Intravenous Handoff 10/4/17 1720)   Morphine sulfate (PF) injection 2 mg (2 mg Intravenous Given 10/7/17 1037)     ECG/EMG Results (last 24 hours)     Procedure Component Value Units Date/Time    ECG 12 Lead [[id number]] Collected:  10/04/17 1348     Updated:  10/04/17 1352                       MDM  Number of Diagnoses or Management Options  Adult failure to thrive syndrome:   Chronic anemia:   Chronic renal failure, stage 4 (severe):   Diarrhea in adult patient:   Morbid obesity with body mass index of 50.0-59.9 in adult:   Urinary tract infection associated with indwelling urethral catheter, initial encounter:   Diagnosis management comments:       I have reviewed all available studies at the bedside with the patient.  This is an unfortunate soul to is not too old but really is declined precipitously in the past 2 months.  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  She tells me she was not admitted in [place] and recent memory.    He been noncompliant with her CPAP at home and her weight is been increasing is probably a combination of super morbid obesity and multiple health problems is lead to her decline.    Currently it appears  her biggest issue for catheter associated UTI with early sepsis.  She is a 15,000 white count.  She has chronic renal failure as well though this is a bit better than her baseline.  She main remains profoundly anemic but this is also chronic for as well to live type and screen the patient set suspect her hemoglobin will drop with hydration and she may need transfusion.    I've started on broad-spectrum IV antibiotics to cover drug-resistant organisms.  She's also had 3 or 4 episodes of diarrhea today and I worry about C. difficile toxicity with her she is benign abdomen currently.  I will give her one dose of oral vancomycin pending her stool studies.    I think she'll need admission the hospital and probably need placement with hopefully getting her back is condition she can live on her own independently.    I spoke Dr. Sadler, on-call hospital medicine, and he'll admit the patient.    All are agreeable with the plan       Amount and/or Complexity of Data Reviewed  Clinical lab tests: reviewed  Tests in the radiology section of CPT®: reviewed  Tests in the medicine section of CPT®: reviewed  Decide to obtain previous medical records or to obtain history from someone other than the patient: yes        Final diagnoses:   Urinary tract infection associated with indwelling urethral catheter, initial encounter   Chronic renal failure, stage 4 (severe)   Morbid obesity with body mass index of 50.0-59.9 in adult   Chronic anemia   Diarrhea in adult patient   Adult failure to thrive syndrome       Documentation assistance provided by abelardo Tello.  Information recorded by the abelardo was done at my direction and has been verified and validated by me.     Valeria Tello  10/04/17 1529       Valeria Tello  10/04/17 1532       Micheal Guzman MD  10/08/17 7126

## 2017-10-05 ENCOUNTER — APPOINTMENT (OUTPATIENT)
Dept: CARDIOLOGY | Facility: HOSPITAL | Age: 67
End: 2017-10-05

## 2017-10-05 LAB
ALBUMIN SERPL-MCNC: 2.7 G/DL (ref 3.2–4.8)
ALBUMIN/GLOB SERPL: 0.8 G/DL (ref 1.5–2.5)
ALP SERPL-CCNC: 78 U/L (ref 25–100)
ALT SERPL W P-5'-P-CCNC: 9 U/L (ref 7–40)
ANION GAP SERPL CALCULATED.3IONS-SCNC: 9 MMOL/L (ref 3–11)
AST SERPL-CCNC: 11 U/L (ref 0–33)
BACTERIA BLD CULT: ABNORMAL
BASOPHILS # BLD AUTO: 0.01 10*3/MM3 (ref 0–0.2)
BASOPHILS NFR BLD AUTO: 0.1 % (ref 0–1)
BH CV ECHO MEAS - BSA(HAYCOCK): 2.8 M^2
BH CV ECHO MEAS - BSA: 2.5 M^2
BH CV ECHO MEAS - BZI_BMI: 54.2 KILOGRAMS/M^2
BH CV ECHO MEAS - BZI_METRIC_HEIGHT: 167.6 CM
BH CV ECHO MEAS - BZI_METRIC_WEIGHT: 152.4 KG
BH CV LOWER VASCULAR LEFT COMMON FEMORAL AUGMENT: NORMAL
BH CV LOWER VASCULAR LEFT COMMON FEMORAL COMPRESS: NORMAL
BH CV LOWER VASCULAR LEFT COMMON FEMORAL PHASIC: NORMAL
BH CV LOWER VASCULAR LEFT COMMON FEMORAL SPONT: NORMAL
BH CV LOWER VASCULAR LEFT DISTAL FEMORAL AUGMENT: NORMAL
BH CV LOWER VASCULAR LEFT DISTAL FEMORAL PHASIC: NORMAL
BH CV LOWER VASCULAR LEFT DISTAL FEMORAL SPONT: NORMAL
BH CV LOWER VASCULAR LEFT GREATER SAPH AK COMPRESS: NORMAL
BH CV LOWER VASCULAR LEFT GREATER SAPH BK COMPRESS: NORMAL
BH CV LOWER VASCULAR LEFT LESSER SAPH COMPRESS: NORMAL
BH CV LOWER VASCULAR LEFT MID FEMORAL AUGMENT: NORMAL
BH CV LOWER VASCULAR LEFT MID FEMORAL COMPRESS: NORMAL
BH CV LOWER VASCULAR LEFT MID FEMORAL PHASIC: NORMAL
BH CV LOWER VASCULAR LEFT MID FEMORAL SPONT: NORMAL
BH CV LOWER VASCULAR LEFT PERONEAL AUGMENT: NORMAL
BH CV LOWER VASCULAR LEFT POPLITEAL AUGMENT: NORMAL
BH CV LOWER VASCULAR LEFT POPLITEAL COMPRESS: NORMAL
BH CV LOWER VASCULAR LEFT POPLITEAL PHASIC: NORMAL
BH CV LOWER VASCULAR LEFT POPLITEAL SPONT: NORMAL
BH CV LOWER VASCULAR LEFT POSTERIOR TIBIAL AUGMENT: NORMAL
BH CV LOWER VASCULAR LEFT PROXIMAL FEMORAL AUGMENT: NORMAL
BH CV LOWER VASCULAR LEFT PROXIMAL FEMORAL COMPRESS: NORMAL
BH CV LOWER VASCULAR LEFT PROXIMAL FEMORAL PHASIC: NORMAL
BH CV LOWER VASCULAR LEFT PROXIMAL FEMORAL SPONT: NORMAL
BH CV LOWER VASCULAR LEFT SAPHENOFEMORAL JUNCTION AUGMENT: NORMAL
BH CV LOWER VASCULAR LEFT SAPHENOFEMORAL JUNCTION COMPRESS: NORMAL
BH CV LOWER VASCULAR LEFT SAPHENOFEMORAL JUNCTION PHASIC: NORMAL
BH CV LOWER VASCULAR LEFT SAPHENOFEMORAL JUNCTION SPONT: NORMAL
BH CV LOWER VASCULAR RIGHT COMMON FEMORAL AUGMENT: NORMAL
BH CV LOWER VASCULAR RIGHT COMMON FEMORAL COMPRESS: NORMAL
BH CV LOWER VASCULAR RIGHT COMMON FEMORAL PHASIC: NORMAL
BH CV LOWER VASCULAR RIGHT COMMON FEMORAL SPONT: NORMAL
BH CV LOWER VASCULAR RIGHT DISTAL FEMORAL AUGMENT: NORMAL
BH CV LOWER VASCULAR RIGHT DISTAL FEMORAL PHASIC: NORMAL
BH CV LOWER VASCULAR RIGHT DISTAL FEMORAL SPONT: NORMAL
BH CV LOWER VASCULAR RIGHT GASTRONEMIUS COMPRESS: NORMAL
BH CV LOWER VASCULAR RIGHT GREATER SAPH AK COMPRESS: NORMAL
BH CV LOWER VASCULAR RIGHT GREATER SAPH BK COMPRESS: NORMAL
BH CV LOWER VASCULAR RIGHT LESSER SAPH COMPRESS: NORMAL
BH CV LOWER VASCULAR RIGHT MID FEMORAL AUGMENT: NORMAL
BH CV LOWER VASCULAR RIGHT MID FEMORAL COMPRESS: NORMAL
BH CV LOWER VASCULAR RIGHT MID FEMORAL PHASIC: NORMAL
BH CV LOWER VASCULAR RIGHT MID FEMORAL SPONT: NORMAL
BH CV LOWER VASCULAR RIGHT PERONEAL AUGMENT: NORMAL
BH CV LOWER VASCULAR RIGHT POPLITEAL AUGMENT: NORMAL
BH CV LOWER VASCULAR RIGHT POPLITEAL COMPETENT: NORMAL
BH CV LOWER VASCULAR RIGHT POPLITEAL COMPRESS: NORMAL
BH CV LOWER VASCULAR RIGHT POPLITEAL PHASIC: NORMAL
BH CV LOWER VASCULAR RIGHT POPLITEAL SPONT: NORMAL
BH CV LOWER VASCULAR RIGHT POSTERIOR TIBIAL AUGMENT: NORMAL
BH CV LOWER VASCULAR RIGHT PROXIMAL FEMORAL AUGMENT: NORMAL
BH CV LOWER VASCULAR RIGHT PROXIMAL FEMORAL COMPRESS: NORMAL
BH CV LOWER VASCULAR RIGHT PROXIMAL FEMORAL PHASIC: NORMAL
BH CV LOWER VASCULAR RIGHT PROXIMAL FEMORAL SPONT: NORMAL
BH CV LOWER VASCULAR RIGHT SAPHENOFEMORAL JUNCTION AUGMENT: NORMAL
BH CV LOWER VASCULAR RIGHT SAPHENOFEMORAL JUNCTION COMPRESS: NORMAL
BH CV LOWER VASCULAR RIGHT SAPHENOFEMORAL JUNCTION PHASIC: NORMAL
BH CV LOWER VASCULAR RIGHT SAPHENOFEMORAL JUNCTION SPONT: NORMAL
BILIRUB SERPL-MCNC: 0.4 MG/DL (ref 0.3–1.2)
BUN BLD-MCNC: 39 MG/DL (ref 9–23)
BUN/CREAT SERPL: 22.9 (ref 7–25)
C DIFF TOX GENS STL QL NAA+PROBE: DETECTED
CA-I SERPL ISE-MCNC: 1.08 MMOL/L (ref 1.12–1.32)
CALCIUM SPEC-SCNC: 7.5 MG/DL (ref 8.7–10.4)
CHLORIDE SERPL-SCNC: 102 MMOL/L (ref 99–109)
CO2 SERPL-SCNC: 30 MMOL/L (ref 20–31)
CREAT BLD-MCNC: 1.7 MG/DL (ref 0.6–1.3)
DEPRECATED RDW RBC AUTO: 53.8 FL (ref 37–54)
EOSINOPHIL # BLD AUTO: 0.21 10*3/MM3 (ref 0–0.3)
EOSINOPHIL NFR BLD AUTO: 1.7 % (ref 0–3)
ERYTHROCYTE [DISTWIDTH] IN BLOOD BY AUTOMATED COUNT: 16.6 % (ref 11.3–14.5)
GFR SERPL CREATININE-BSD FRML MDRD: 36 ML/MIN/1.73
GLOBULIN UR ELPH-MCNC: 3.5 GM/DL
GLUCOSE BLD-MCNC: 95 MG/DL (ref 70–100)
GLUCOSE BLDC GLUCOMTR-MCNC: 131 MG/DL (ref 70–130)
GLUCOSE BLDC GLUCOMTR-MCNC: 158 MG/DL (ref 70–130)
GLUCOSE BLDC GLUCOMTR-MCNC: 167 MG/DL (ref 70–130)
GLUCOSE BLDC GLUCOMTR-MCNC: 313 MG/DL (ref 70–130)
HBA1C MFR BLD: 5.4 % (ref 4.8–5.6)
HCT VFR BLD AUTO: 20.7 % (ref 34.5–44)
HEMOCCULT STL QL: NEGATIVE
HGB BLD-MCNC: 6.9 G/DL (ref 11.5–15.5)
IMM GRANULOCYTES # BLD: 0.06 10*3/MM3 (ref 0–0.03)
IMM GRANULOCYTES NFR BLD: 0.5 % (ref 0–0.6)
LYMPHOCYTES # BLD AUTO: 1.95 10*3/MM3 (ref 0.6–4.8)
LYMPHOCYTES NFR BLD AUTO: 15.9 % (ref 24–44)
MAGNESIUM SERPL-MCNC: 1.1 MG/DL (ref 1.3–2.7)
MCH RBC QN AUTO: 29.5 PG (ref 27–31)
MCHC RBC AUTO-ENTMCNC: 33.3 G/DL (ref 32–36)
MCV RBC AUTO: 88.5 FL (ref 80–99)
MONOCYTES # BLD AUTO: 0.63 10*3/MM3 (ref 0–1)
MONOCYTES NFR BLD AUTO: 5.1 % (ref 0–12)
NEUTROPHILS # BLD AUTO: 9.43 10*3/MM3 (ref 1.5–8.3)
NEUTROPHILS NFR BLD AUTO: 76.7 % (ref 41–71)
PHOSPHATE SERPL-MCNC: 2.9 MG/DL (ref 2.4–5.1)
PLATELET # BLD AUTO: 299 10*3/MM3 (ref 150–450)
PMV BLD AUTO: 9.3 FL (ref 6–12)
POTASSIUM BLD-SCNC: 2.8 MMOL/L (ref 3.5–5.5)
POTASSIUM BLD-SCNC: 3.1 MMOL/L (ref 3.5–5.5)
PROT SERPL-MCNC: 6.2 G/DL (ref 5.7–8.2)
RBC # BLD AUTO: 2.34 10*6/MM3 (ref 3.89–5.14)
SODIUM BLD-SCNC: 141 MMOL/L (ref 132–146)
TSH SERPL DL<=0.05 MIU/L-ACNC: 4.49 MIU/ML (ref 0.35–5.35)
WBC NRBC COR # BLD: 12.29 10*3/MM3 (ref 3.5–10.8)
WBC STL QL MICRO: NORMAL

## 2017-10-05 PROCEDURE — 25010000002 MAGNESIUM SULFATE IN D5W 1G/100ML (PREMIX) 1-5 GM/100ML-% SOLUTION: Performed by: NURSE PRACTITIONER

## 2017-10-05 PROCEDURE — 25010000002 CEFTRIAXONE PER 250 MG: Performed by: INTERNAL MEDICINE

## 2017-10-05 PROCEDURE — 84100 ASSAY OF PHOSPHORUS: CPT | Performed by: NURSE PRACTITIONER

## 2017-10-05 PROCEDURE — 85025 COMPLETE CBC W/AUTO DIFF WBC: CPT | Performed by: NURSE PRACTITIONER

## 2017-10-05 PROCEDURE — 87045 FECES CULTURE AEROBIC BACT: CPT | Performed by: EMERGENCY MEDICINE

## 2017-10-05 PROCEDURE — 82330 ASSAY OF CALCIUM: CPT | Performed by: NURSE PRACTITIONER

## 2017-10-05 PROCEDURE — 83735 ASSAY OF MAGNESIUM: CPT | Performed by: NURSE PRACTITIONER

## 2017-10-05 PROCEDURE — 82962 GLUCOSE BLOOD TEST: CPT

## 2017-10-05 PROCEDURE — 97530 THERAPEUTIC ACTIVITIES: CPT

## 2017-10-05 PROCEDURE — P9016 RBC LEUKOCYTES REDUCED: HCPCS

## 2017-10-05 PROCEDURE — 25010000002 HEPARIN (PORCINE) PER 1000 UNITS: Performed by: NURSE PRACTITIONER

## 2017-10-05 PROCEDURE — 99233 SBSQ HOSP IP/OBS HIGH 50: CPT | Performed by: INTERNAL MEDICINE

## 2017-10-05 PROCEDURE — 97110 THERAPEUTIC EXERCISES: CPT

## 2017-10-05 PROCEDURE — 82272 OCCULT BLD FECES 1-3 TESTS: CPT | Performed by: EMERGENCY MEDICINE

## 2017-10-05 PROCEDURE — 87046 STOOL CULTR AEROBIC BACT EA: CPT | Performed by: EMERGENCY MEDICINE

## 2017-10-05 PROCEDURE — 84132 ASSAY OF SERUM POTASSIUM: CPT | Performed by: INTERNAL MEDICINE

## 2017-10-05 PROCEDURE — 36430 TRANSFUSION BLD/BLD COMPNT: CPT

## 2017-10-05 PROCEDURE — 84443 ASSAY THYROID STIM HORMONE: CPT | Performed by: NURSE PRACTITIONER

## 2017-10-05 PROCEDURE — 86900 BLOOD TYPING SEROLOGIC ABO: CPT

## 2017-10-05 PROCEDURE — 97166 OT EVAL MOD COMPLEX 45 MIN: CPT

## 2017-10-05 PROCEDURE — 93970 EXTREMITY STUDY: CPT | Performed by: INTERNAL MEDICINE

## 2017-10-05 PROCEDURE — 80053 COMPREHEN METABOLIC PANEL: CPT | Performed by: NURSE PRACTITIONER

## 2017-10-05 PROCEDURE — 97162 PT EVAL MOD COMPLEX 30 MIN: CPT

## 2017-10-05 PROCEDURE — 83036 HEMOGLOBIN GLYCOSYLATED A1C: CPT | Performed by: NURSE PRACTITIONER

## 2017-10-05 PROCEDURE — 87493 C DIFF AMPLIFIED PROBE: CPT | Performed by: EMERGENCY MEDICINE

## 2017-10-05 PROCEDURE — 93970 EXTREMITY STUDY: CPT

## 2017-10-05 PROCEDURE — 87205 SMEAR GRAM STAIN: CPT | Performed by: EMERGENCY MEDICINE

## 2017-10-05 RX ORDER — CEFTRIAXONE SODIUM 1 G/50ML
1 INJECTION, SOLUTION INTRAVENOUS
Status: DISCONTINUED | OUTPATIENT
Start: 2017-10-05 | End: 2017-10-09

## 2017-10-05 RX ORDER — CASTOR OIL AND BALSAM, PERU 788; 87 MG/G; MG/G
OINTMENT TOPICAL EVERY 12 HOURS SCHEDULED
Status: DISCONTINUED | OUTPATIENT
Start: 2017-10-05 | End: 2017-10-12 | Stop reason: HOSPADM

## 2017-10-05 RX ADMIN — POTASSIUM CHLORIDE 40 MEQ: 750 CAPSULE, EXTENDED RELEASE ORAL at 15:18

## 2017-10-05 RX ADMIN — ATORVASTATIN CALCIUM 10 MG: 10 TABLET, FILM COATED ORAL at 20:22

## 2017-10-05 RX ADMIN — Medication 125 MG: at 11:21

## 2017-10-05 RX ADMIN — POTASSIUM CHLORIDE 40 MEQ: 750 CAPSULE, EXTENDED RELEASE ORAL at 20:21

## 2017-10-05 RX ADMIN — Medication 250 MG: at 09:04

## 2017-10-05 RX ADMIN — CEFTRIAXONE SODIUM 1 G: 1 INJECTION, SOLUTION INTRAVENOUS at 11:24

## 2017-10-05 RX ADMIN — CASTOR OIL AND BALSAM, PERU: 788; 87 OINTMENT TOPICAL at 11:20

## 2017-10-05 RX ADMIN — INSULIN LISPRO 5 UNITS: 100 INJECTION, SOLUTION INTRAVENOUS; SUBCUTANEOUS at 16:55

## 2017-10-05 RX ADMIN — CASTOR OIL AND BALSAM, PERU: 788; 87 OINTMENT TOPICAL at 20:19

## 2017-10-05 RX ADMIN — HYDROCODONE BITARTATE AND ACETAMINOPHEN 1 TABLET: 5; 325 TABLET ORAL at 09:10

## 2017-10-05 RX ADMIN — Medication 125 MG: at 18:10

## 2017-10-05 RX ADMIN — HYDRALAZINE HYDROCHLORIDE 75 MG: 25 TABLET ORAL at 09:03

## 2017-10-05 RX ADMIN — ISOSORBIDE MONONITRATE 60 MG: 60 TABLET, EXTENDED RELEASE ORAL at 09:04

## 2017-10-05 RX ADMIN — SIMETHICONE CHEW TAB 80 MG 80 MG: 80 TABLET ORAL at 20:22

## 2017-10-05 RX ADMIN — MULTIPLE VITAMINS W/ MINERALS TAB 1 TABLET: TAB ORAL at 09:03

## 2017-10-05 RX ADMIN — HEPARIN SODIUM 5000 UNITS: 5000 INJECTION, SOLUTION INTRAVENOUS; SUBCUTANEOUS at 20:15

## 2017-10-05 RX ADMIN — HYDROCODONE BITARTATE AND ACETAMINOPHEN 1 TABLET: 5; 325 TABLET ORAL at 15:19

## 2017-10-05 RX ADMIN — HEPARIN SODIUM 5000 UNITS: 5000 INJECTION, SOLUTION INTRAVENOUS; SUBCUTANEOUS at 06:29

## 2017-10-05 RX ADMIN — INSULIN LISPRO 2 UNITS: 100 INJECTION, SOLUTION INTRAVENOUS; SUBCUTANEOUS at 09:05

## 2017-10-05 RX ADMIN — Medication 125 MG: at 06:29

## 2017-10-05 RX ADMIN — CLONAZEPAM 0.25 MG: 0.5 TABLET ORAL at 00:48

## 2017-10-05 RX ADMIN — ESCITALOPRAM OXALATE 10 MG: 10 TABLET ORAL at 09:04

## 2017-10-05 RX ADMIN — POTASSIUM CHLORIDE 40 MEQ: 750 CAPSULE, EXTENDED RELEASE ORAL at 06:28

## 2017-10-05 RX ADMIN — POTASSIUM CHLORIDE 40 MEQ: 750 CAPSULE, EXTENDED RELEASE ORAL at 00:50

## 2017-10-05 RX ADMIN — MAGNESIUM SULFATE HEPTAHYDRATE 1 G: 1 INJECTION, SOLUTION INTRAVENOUS at 13:24

## 2017-10-05 RX ADMIN — Medication 250 MG: at 18:10

## 2017-10-05 RX ADMIN — MAGNESIUM SULFATE HEPTAHYDRATE 1 G: 1 INJECTION, SOLUTION INTRAVENOUS at 20:21

## 2017-10-05 RX ADMIN — Medication 125 MG: at 00:49

## 2017-10-05 RX ADMIN — MAGNESIUM SULFATE HEPTAHYDRATE 1 G: 1 INJECTION, SOLUTION INTRAVENOUS at 16:54

## 2017-10-05 RX ADMIN — HEPARIN SODIUM 5000 UNITS: 5000 INJECTION, SOLUTION INTRAVENOUS; SUBCUTANEOUS at 20:22

## 2017-10-05 RX ADMIN — AMLODIPINE BESYLATE 5 MG: 5 TABLET ORAL at 09:04

## 2017-10-05 RX ADMIN — HYDROCODONE BITARTATE AND ACETAMINOPHEN 1 TABLET: 5; 325 TABLET ORAL at 20:22

## 2017-10-05 RX ADMIN — CLONAZEPAM 0.25 MG: 0.5 TABLET ORAL at 20:22

## 2017-10-05 RX ADMIN — INSULIN LISPRO 2 UNITS: 100 INJECTION, SOLUTION INTRAVENOUS; SUBCUTANEOUS at 13:31

## 2017-10-05 RX ADMIN — HEPARIN SODIUM 5000 UNITS: 5000 INJECTION, SOLUTION INTRAVENOUS; SUBCUTANEOUS at 15:19

## 2017-10-05 NOTE — PLAN OF CARE
Problem: Patient Care Overview (Adult)  Goal: Plan of Care Review  Outcome: Ongoing (interventions implemented as appropriate)    10/05/17 1151   Coping/Psychosocial Response Interventions   Plan Of Care Reviewed With patient   Patient Care Overview   Progress progress towards functional goals is fair   Outcome Evaluation   Outcome Summary/Follow up Plan Pt. demonstrating evolving symptoms of weakness, pain and limited activity tolerance due to recent PNA, renal failure and anemia with current acute cystitis with hematuria, anemia and diarrhea. Pt. appears with some confusion switiching from one year to next in one sentence. Per pt. she left AMA from SNF question accuracy. Pt. does not appear able to care for self and if does not improve significantly cognitvely and physically soon would recommend ECF.         Problem: Inpatient Occupational Therapy  Goal: Bed Mobility Goal LTG- OT  Outcome: Ongoing (interventions implemented as appropriate)    10/05/17 1151   Bed Mobility OT LTG   Bed Mobility OT LTG, Date Established 10/05/17   Bed Mobility OT LTG, Time to Achieve 2 wks   Bed Mobility OT LTG, Activity Type roll left/roll right   Bed Mobility OT LTG, [place] Level maximum assist (25% patient effort);2 person assist required   Bed Mobility OT LTG, Assist Device bed rails   Bed Mobility OT LTG, Outcome goal ongoing       Goal: Transfer Training Goal 1 LTG- OT  Outcome: Ongoing (interventions implemented as appropriate)    10/05/17 1151   Transfer Training OT LTG   Transfer Training OT LTG, Date Established 10/05/17   Transfer Training OT LTG, Time to Achieve 2 wks   Transfer Training OT LTG, Activity Type sit to stand/stand to sit   Transfer Training OT LTG, [place] Level maximum assist (25% patient effort);2 person assist required   Transfer Training OT LTG, Assist Device walker, rolling  (vs UE support or other AD)   Transfer Training OT LTG, Outcome goal ongoing       Goal: Strength Goal STG-  OT  Outcome: Ongoing (interventions implemented as appropriate)    10/05/17 1151   Strength OT STG   Strength Goal OT STG, Date Established 10/05/17   Strength Goal OT STG, Time to Achieve 1 wk   Strength Goal OT STG, Measure to Achieve Pt. complete daily AROM/T-band exer. to support ADL and mobility indep.   Strength Goal OT STG, Outcome      10/05/17 1151   Strength OT STG   Strength Goal OT STG, Date Established 10/05/17   Strength Goal OT STG, Time to Achieve 1 wk   Strength Goal OT STG, Measure to Achieve Pt. complete each session AROM/T-band exer. to support ADL and mobility indep.   Strength Goal OT STG, Outcome goal ongoing   goal ongoing       Goal: Dynamic Sitting Balance Goal STG- OT  Outcome: Ongoing (interventions implemented as appropriate)    10/05/17 1151   Dynamic Sitting Balance OT STG   Dynamic Sitting Balance OT STG, Date Established 10/05/17   Dynamic Sitting Balance OT STG, Time to Achieve 1 wk   Dynamic Sitting Balance OT STG, [place] Level contact guard assist   Dynamic Sitting Balance OT STG, Assist Device UE Support   Dynamic Sitting Balance OT STG, Additional Goal 10 min   Dynamic Sitting Balance OT STG, Outcome goal ongoing       Goal: Static Standing Balance Goal LTG- OT  Outcome: Ongoing (interventions implemented as appropriate)    10/05/17 1151   Static Standing Balance OT LTG   Static Standing Balance OT LTG, Date Established 10/05/17   Static Standing Balance OT LTG, Time to Achieve 2 wks   Static Standing Balance OT LTG, [place] Level maximum assist (25% patient effort);2 person assist required   Static Standing Balance OT LTG, Assist Device UE Support;assistive device   Static Standing Balance OT LTG, Additional Goal 10 sec   Static Standing Balance OT LTG, Outcome goal ongoing       Goal: Grooming Goal STG- OT  Outcome: Ongoing (interventions implemented as appropriate)    10/05/17 1151   Grooming OT STG   Grooming Goal OT STG, Date Established 10/05/17   Grooming Goal  OT STG, Time to Achieve 1 wk   Grooming Goal OT STG, Activity Type wash face and hands, brush teeth,   Grooming Goal OT STG, [place] Level supervision required;set up required   Grooming Goal OT STG, Position sitting, edge of bed;sitting in chair   Grooming Goal OT STG, Outcome goal ongoing

## 2017-10-05 NOTE — PLAN OF CARE
Problem: Pressure Ulcer Risk (Saurav Scale) (Adult,Obstetrics,Pediatric)  Goal: Identify Related Risk Factors and Signs and Symptoms  Outcome: Ongoing (interventions implemented as appropriate)    10/05/17 0754   Pressure Ulcer Risk (Saurav Scale)   Related Risk Factors (Pressure Ulcer Risk (Saurav Scale)) body weight extremes;mobility impaired

## 2017-10-05 NOTE — PROGRESS NOTES
Discharge Planning Assessment  [facility]     Patient Name: Joy Bueno  MRN: [id number]  Today's Date: 10/5/2017    Admit Date: 10/4/2017          Discharge Needs Assessment       10/05/17 1509    Living Environment    Lives With alone    Living Arrangements house    Transportation Available family or friend will provide;car    Living Environment    Quality Of Family Relationships stressful    Able to Return to Prior Living Arrangements other (see comments)    Living Arrangement Comments --   She lives in her own home and she was requiring assistance from her neighbor  to obtain food and get care at home.    Discharge Needs Assessment    Discharge Facility/Level Of Care Needs nursing facility, basic            Discharge Plan       10/05/17 1511    Case Management/Social Work Plan    Plan Social work spoke with Ms. Bueno for an assessment and she said that she lives alone at her home in [place].  She has lived at her home for 15 years.  Adult protection is involved in her case and there are concerns for self neglect.  She left [facility] against medical advise. She was given a listing of nursing facilities in [place]. Raghavendra Moncada with adult protection is follow her. She states she wants to go live with a friend in [place].  She said she does not want her daughter to be contacted or involved in her care at this time.    Patient/Family In Agreement With Plan yes        Discharge Placement     No information found        Expected Discharge Date and Time     Expected Discharge Date Expected Discharge Time    Oct 9, 2017               Demographic Summary       10/05/17 1501    Primary Care Physician Information    Name Trevon Delarosa            Functional Status       10/05/17 1507    Functional Status Current    Ambulation 1-->assistive equipment    Transferring 1-->assistive equipment    Toileting 2-->assistive person    Bathing 2-->assistive person    Dressing 2-->assistive person    Eating  0-->independent    Communication 0-->understands/communicates without difficulty    Swallowing (if score 2 or more for any item, consult Rehab Services) 0-->swallows foods/liquids without difficulty    Change in Functional Status Since Onset of Current Illness/Injury no    Functional Status Prior    Ambulation 1-->assistive equipment    Transferring 1-->assistive equipment    Toileting 2-->assistive person    Bathing 2-->assistive person    Dressing 2-->assistive person    Eating 0-->independent    IADL    Medications assistive person    Meal Preparation assistive person    Housekeeping assistive person    Laundry assistive person    Shopping assistive person    Oral Care assistive person    Activity Tolerance    Usual Activity Tolerance fair    Current Activity Tolerance fair    Cognitive/Perceptual/Developmental    Current Mental Status/Cognitive Functioning no deficits noted    Recent Changes in Mental Status/Cognitive Functioning no changes    Developmental Stage (Eriksson's Stages of Development) Stage 8 (65 years-death/Late Adulthood) Integrity vs. Despair    Employment/Financial    Financial Concerns unable to meet basic needs            Psychosocial     None            Abuse/Neglect     None            Legal     None            Substance Abuse     None            Patient Forms     None          ROGELIO Monsivais

## 2017-10-05 NOTE — PLAN OF CARE
Problem: Patient Care Overview (Adult)  Goal: Plan of Care Review  Outcome: Ongoing (interventions implemented as appropriate)    10/05/17 0323   Coping/Psychosocial Response Interventions   Plan Of Care Reviewed With patient   Patient Care Overview   Progress no change   Outcome Evaluation   Outcome Summary/Follow up Plan Pain management effective. No falls. Comfort maintained throughout night.          Problem: Pain, Acute (Adult)  Goal: Identify Related Risk Factors and Signs and Symptoms  Outcome: Ongoing (interventions implemented as appropriate)    Problem: Fall Risk (Adult)  Goal: Identify Related Risk Factors and Signs and Symptoms  Outcome: Ongoing (interventions implemented as appropriate)

## 2017-10-05 NOTE — PLAN OF CARE
Problem: Patient Care Overview (Adult)  Goal: Plan of Care Review  Outcome: Ongoing (interventions implemented as appropriate)    10/05/17 1115   Coping/Psychosocial Response Interventions   Plan Of Care Reviewed With patient   Patient Care Overview   Progress progress toward functional goals is gradual   Outcome Evaluation   Outcome Summary/Follow up Plan Presents w/ evolving sympt, incl. UTI/cystitis w/ hematuria, low HGB (6.9) which may req. transfusion, adult FTT, weakness/neuropathy/ immobil. x 1 wk s/p d/c AMA from SNF, impaired skin integrity LE'S req.WOC intervention, ARF w/ edema all extrem, & impaired cognition/inability to focus on task; poor tolerance of LE ROM exer, & refused bed mobil./EOB sitting d/t pain; once pt moves to lift room, will attempt lift transf to chair & pre-gt activ. ;recommend placement, d/t inability to care for self at home & impaired cognition (lives alone)          Problem: Inpatient Physical Therapy  Goal: Bed Mobility Goal LTG- PT  Outcome: Ongoing (interventions implemented as appropriate)    10/05/17 1115   Bed Mobility PT LTG   Bed Mobility PT LTG, Date Established 10/05/17   Bed Mobility PT LTG, Time to Achieve 2 wks   Bed Mobility PT LTG, Activity Type all bed mobility   Bed Mobility PT LTG, [place] Level maximum assist (25% patient effort);2 person assist required       Goal: Transfer Training Goal 1 LTG- PT  Outcome: Ongoing (interventions implemented as appropriate)    10/05/17 1115   Transfer Training PT LTG   Transfer Training PT LTG, Date Established 10/05/17   Transfer Training PT LTG, Time to Achieve 2 wks   Transfer Training PT LTG, Activity Type sit to stand/stand to sit   Transfer Training PT LTG, [place] Level maximum assist (25% patient effort);2 person assist required  (stable VS)   Transfer Training PT LTG, Assist Device walker, rolling       Goal: Static Sitting Balance Goal STG- PT  Outcome: Ongoing (interventions implemented as appropriate)     10/05/17 1115   Static Sitting Balance PT STG   Static Sitting Balance PT STG, Date Established 10/05/17   Static Sitting Balance PT STG, Time to Achieve 1 wk   Static Sitting Balance PT STG, [place] Level moderate assist (50% patient effort)       Goal: Dynamic Sitting Balance Goal LTG- PT  Outcome: Ongoing (interventions implemented as appropriate)    10/05/17 1115   Dynamic Sitting Balance PT LTG   Dynamic Sitting Balance PT LTG, Date Established 10/05/17   Dynamic Sitting Balance PT LTG, Time to Achieve 2 wks   Dynamic Sitting Balance PT LTG, [place] Level moderate assist (50% patient effort);2 person assist required         10/05/17 1115   Dynamic Sitting Balance PT LTG   Dynamic Sitting Balance PT LTG, Date Established 10/05/17   Dynamic Sitting Balance PT LTG, Time to Achieve 2 wks   Dynamic Sitting Balance PT LTG, [place] Level moderate assist (50% patient effort);2 person assist required       Goal: Static Standing Balance Goal LTG- PT  Outcome: Ongoing (interventions implemented as appropriate)    10/05/17 1115   Static Standing Balance PT LTG   Static Standing Balance PT LTG, Date Established 10/05/17   Static Standing Balance PT LTG, Time to Achieve 2 wks   Static Standing Balance PT LTG, [place] Level maximum assist (25% patient effort);2 person assist required   Static Standing Balance PT LTG, Assist Device assistive Device

## 2017-10-05 NOTE — THERAPY EVALUATION
Acute Care - Occupational Therapy Initial Evaluation   [place]     Patient Name: Joy Bueno  : 1950  MRN: [id number]  Today's Date: 10/5/2017  Onset of Illness/Injury or Date of Surgery Date: 10/04/17  Date of Referral to OT: 10/04/17  Referring Physician: SUELLEN Guerra    Admit Date: 10/4/2017       ICD-10-CM ICD-9-CM   1. Urinary tract infection associated with indwelling urethral catheter, initial encounter T83.511A 996.64    N39.0 599.0   2. Chronic renal failure, stage 4 (severe) N18.4 585.4   3. Morbid obesity with body mass index of 50.0-59.9 in adult E66.01 278.01    Z68.43 V85.43   4. Chronic anemia D64.9 285.9   5. Diarrhea in adult patient R19.7 787.91   6. Adult failure to thrive syndrome R62.7 783.7   7. Impaired mobility and ADLs Z74.09 799.89     Patient Active Problem List   Diagnosis   • Asthma   • Diabetes mellitus   • Hypertension   • Symptomatic anemia   • Morbid obesity   • Anxiety   • Fatigue   • Anemia   • CKD (chronic kidney disease) Stage 3   • Gastritis   • Pleural effusion, right   • Leukocytosis   • Possible pneumonia of RML/RLL   • Acute cystitis with hematuria   • Hypokalemia   • Hypomagnesemia   • Diarrhea   • Urinary tract infection associated with indwelling urethral catheter   • Bilateral leg pain     Past Medical History:   Diagnosis Date   • Anemia    • Anxiety    • Asthma    • Diabetes mellitus    • Hypertension    • Morbid obesity    • Osteoarthritis      Past Surgical History:   Procedure Laterality Date   •  SECTION     • COLONOSCOPY N/A 2017    Procedure: COLONOSCOPY;  Surgeon: Mark I Brunner, MD;  Location:  [facility];  Service:    • ENDOSCOPY N/A 2017    Procedure: ESOPHAGOGASTRODUODENOSCOPY ;  Surgeon: Velasquez Call MD;  Location:  [facility];  Service:    • HERNIA REPAIR            OT ASSESSMENT FLOWSHEET (last 72 hours)      OT Evaluation       10/05/17 1015 10/04/17 2130 10/04/17 1700          Rehab Evaluation    Document  Type evaluation  -MITALI        Subjective Information agree to therapy;complains of;fatigue;pain;weakness  -MITALI        Patient Effort, Rehab Treatment adequate  -MITALI        Symptoms Noted During/After Treatment increased pain  -MITALI        Symptoms Noted Comment increased pain if touch or moved LLE especially foot.  Pt. not allowing LE's to be moved.  -MITALI        General Information    Patient Profile Review yes  -MITALI        Onset of Illness/Injury or Date of Surgery Date 10/04/17  -MITALI        Referring Physician SUELLEN Guerra  -MITALI        General Observations Pt. partially sitting up in bed.  Pt. with 02 in nose, but on room air 100%.  Specialty bed outside room for pt. to be moved to.  IV in R breast.  -MITALI        Pertinent History Of Current Problem Pt. with 3 admits in August.  8-3 to 8-17 for renal failure and to rehab.  Pt. with anemia and at first refused any testing.  Pt. then with EGD 8/25 and showed gastropathy and colonscopy showed diverticula and grade 1 internl hemorrhoids.  Pt. readmtted 9-2 to 9-7 for PNA and pleural effusions and back to [facility].  Per pt. she left there AMA and to home in which she has been stuck in her recliner for 7 days wearing brief unable to get to bathroom due to weakness, pain in LE's and L>R SOA with activity.  Pt. found with acute cystitis and hematuia, anemia and diarrhea ruling out c-diff.    -MITALI        Precautions/Limitations fall precautions;other (see comments)   spore precuations, limited skin integrity, pain LE min mvmt  -MITALI        Prior Level of Function independent:;feeding;grooming;dependent:;dressing;bathing;home management;cooking;cleaning;driving;shopping;gait;max assist:;transfer;bed mobility   pt. limited historian so question accuracy  -MITALI        Equipment Currently Used at Home walker, rolling;glucometer;oxygen;commode   limited historian so question accuracy.  -MITALI  walker, rolling;glucometer;oxygen  -KT      Plans/Goals Discussed With patient;agreed upon  -MITALI         Risks Reviewed patient:;increased discomfort;change in vital signs;lines disloged  -MITALI        Benefits Reviewed patient:;improve function;increase independence;increase strength;increase balance;increase knowledge  -MITALI        Barriers to Rehab medically complex;previous functional deficit;cognitive status;physical barrier   pt. does not appears to make rational dicisions.  -MITALI        Living Environment    Lives With alone  -MITALI  alone  -KT      Living Arrangements --   pt. recently home from [facility] per pt  -MITALI  residential facility  -KT      Home Accessibility --   unable to determine home setup from pt.  -MITALI  no concerns  -KT      Stair Railings at Home   none  -KT      Type of Financial/Environmental Concern   none  -KT      Transportation Available   ambulance  -KT      Living Environment Comment per pt. she could not get out of her chair and neighbor and grandtrs 16 yo and 18 yo were helping her get food, roll and change brief and wash up as could.  Did not appears safe setting for pt.  -MITALI        Clinical Impression    Date of Referral to OT 10/04/17  -MITALI        OT Diagnosis impaired ADL and mobility indep due to recent renal failure, PNA and anemia and now currently with anemia diarrhea (possible c-diff) and acute cystitis  with hematuria with weakness.   -MITALI        Patient/Family Goals Statement Pt. wants to be able to go home with friends and family helping.  -MITALI        Criteria for Skilled Therapeutic Interventions Met yes;treatment indicated  -MITALI        Rehab Potential fair, will monitor progress closely   Pt. with pain, confusion and poor decision making.  -MITALI        Therapy Frequency daily  -MITALI        Anticipated Equipment Needs At Discharge --   TBD closer to discharge if appropriate.  -MITALI        Anticipated Discharge Disposition long term acute care facility   unless significant improvement made in mobility  -MITALI        Functional Level Prior    Ambulation   3-->assistive equipment and person   -KT      Transferring   3-->assistive equipment and person  -KT      Toileting   2-->assistive person  -KT      Bathing   2-->assistive person  -KT      Dressing   2-->assistive person  -KT      Eating   0-->independent  -KT      Communication   0-->understands/communicates without difficulty  -KT      Swallowing   0-->swallows foods/liquids without difficulty  -KT      Prior Functional Level Comment    none  -KT      Vital Signs    Pre Systolic BP Rehab --   vitals stable throughout session with systolic BP in 130's  -MITALI        Pre SpO2 (%) 100  -MITALI        O2 Delivery Pre Treatment room air  -MITALI        Post SpO2 (%) 100  -MITALI        O2 Delivery Post Treatment room air  -MITALI        Pre Patient Position Supine  -MITALI        Intra Patient Position Supine  -MITALI        Post Patient Position Supine  -MITALI        Pain Assessment    Pain Assessment 0-10  -MITALI        Pain Score 4   LE's L >R, up to 10/10 if moves LE's  -MITALI        Post Pain Score 4  -MITALI        Pain Type Chronic pain  -MITALI        Pain Location Leg  -MITALI        Pain Orientation Right;Left  -MITALI        Pain Intervention(s) Ambulation/increased activity;Repositioned  -MITALI        Response to Interventions tolerated  -MITALI        Vision Assessment/Intervention    Visual Impairment Comment appears functional with pt. locating items tray and lap and attending to therapist.  Not formally assessed.  -MITALI        Cognitive Assessment/Intervention    Current Cognitive/Communication Assessment impaired   scattered thoughts jumping year to year,   -MITALI        Orientation Status oriented to;person  -MITALI        Follows Commands/Answers Questions 100% of the time;able to follow single-step instructions;needs cueing;needs increased time;needs repetition  -MITALI        Personal Safety moderate impairment;decreased insight to deficits;unaware of consequences of deficits;unaware of cognitive deficits  -MITALI        Personal Safety Interventions fall prevention program maintained;gait belt;nonskid  shoes/slippers when out of bed   if up  -MITALI        Short/Long Term Memory --   at least mild to mod impairement.  -MITALI        ROM (Range of Motion)    General ROM upper extremity range of motion deficits identified  -MITALI        General ROM Detail See PT LE, R shoulder only to 90 degrees PROM and hand 75% finger flexion R.  -MITALI        MMT (Manual Muscle Testing)    General MMT Assessment upper extremity strength deficits identified  -MITALI        General MMT Assessment Detail Pt. with approx 4 to 4+/5 strength weaker proximally.  See PT LE.  -MITALI        Muscle Tone Assessment    Muscle Tone Assessment  Bilateral Lower Extremities  -PH       Bilateral Lower Extremities Muscle Tone Assessment  mildly decreased tone  -PH       Bed Mobility, Assessment/Treatment    Bed Mobility, Comment Pt. refusing any bed mobility due to causes LE pain up to 10/10 and due to weakness.  -MITALI        Transfer Assessment/Treatment    Transfer, Comment Pt. declined due to pain and weakness. HgH only 6.9 pt to get blood.  -MITALI        ADL Assessment/Intervention    Additional Documentation Self-Feeding Assessment/Training (Group)  -MITALI        Lower Body Dressing Assessment/Training    LB Dressing Assess/Train, Comment professional judement pt. max to dep with bathing , dressing and toileting.  -MITALI        Self-Feeding Assessment/Training    Self-Feeding Assess/Train, Comment Pt. able to get cup from table and bring to mouth, but shaky and extra effort.  -MITALI        Motor Skills/Interventions    Additional Documentation Fine Motor Coordination Training (Group)  -MITALI        Therapy Exercises    Bilateral Upper Extremity AROM:;10 reps;supine;elbow flexion/extension;shoulder abduction/adduction;shoulder extension/flexion;shoulder horizontal abd/add;shoulder protraction/retraction;shoulder rolls/shrugs  -MITALI        BUE Resistance manual resistance- minimal   biceps and triceps  -MITALI        Positioning and Restraints    Pre-Treatment Position in bed  -MITALI         Post Treatment Position bed  -MITALI        In Bed supine;call light within reach;encouraged to call for assist;exit alarm on  -MITALI          User Key  (r) = Recorded By, (t) = Taken By, (c) = Cosigned By    Initials Name Effective Dates    MITALI Cristy Julianalban Holliday, OT 06/22/15 -     KT Keara Sullivan, RN 06/16/16 -     PH Joselin Conde RN 08/17/17 -            Occupational Therapy Education     Title: PT OT SLP Therapies (Done)     Topic: Occupational Therapy (Done)     Point: ADL training (Done)    Description: Instruct learner(s) on proper safety adaptation and remediation techniques during self care or transfers.   Instruct in proper use of assistive devices.    Learning Progress Summary    Learner Readiness Method Response Comment Documented by Status   Patient Acceptance E,D VU,NR UE ROM exer, role OT, reason for consult, benefits of activity MITALI 10/05/17 1149 Done               Point: Home exercise program (Done)    Description: Instruct learner(s) on appropriate technique for monitoring, assisting and/or progressing therapeutic exercises/activities.    Learning Progress Summary    Learner Readiness Method Response Comment Documented by Status   Patient Acceptance E,D VU,NR UE ROM exer, role OT, reason for consult, benefits of activity MITALI 10/05/17 1149 Done               Point: Precautions (Done)    Description: Instruct learner(s) on prescribed precautions during self-care and functional transfers.    Learning Progress Summary    Learner Readiness Method Response Comment Documented by Status   Patient Acceptance E,D VU,NR UE ROM exer, role OT, reason for consult, benefits of activity MITALI 10/05/17 1149 Done               Point: Body mechanics (Done)    Description: Instruct learner(s) on proper positioning and spine alignment during self-care, functional mobility activities and/or exercises.    Learning Progress Summary    Learner Readiness Method Response Comment Documented by Status   Patient Acceptance E,D VU,NR UE  ROM exer, role OT, reason for consult, benefits of activity  10/05/17 1149 Done                      User Key     Initials Effective Dates Name Provider Type Discipline     06/22/15 -  Cristy Holliday, OT Occupational Therapist OT                  OT Recommendation and Plan  Anticipated Equipment Needs At Discharge:  (TBD closer to discharge if appropriate.)  Anticipated Discharge Disposition: long term acute care facility (unless significant improvement made in mobility)  Therapy Frequency: daily  Plan of Care Review  Plan Of Care Reviewed With: patient  Progress: progress towards functional goals is fair  Outcome Summary/Follow up Plan: Pt. demonstrating evolving symptoms of weakness, pain and limited activity tolerance due to recent PNA, renal failure and anemia with current acute cystitis with hematuria, anemia and diarrhea.  Pt. appears with some confusion switiching from one year to next in one sentence.  Per pt. she left AMA from SNF question accuracy. Pt. does not appear able to care for self and if does not improve significantly cognitvely and physically soon would recommend ECF.          OT Goals       10/05/17 1151          Bed Mobility OT LTG    Bed Mobility OT LTG, Date Established 10/05/17  -MITALI      Bed Mobility OT LTG, Time to Achieve 2 wks  -MITALI      Bed Mobility OT LTG, Activity Type roll left/roll right  -MITALI      Bed Mobility OT LTG, [place] Level maximum assist (25% patient effort);2 person assist required  -MITALI      Bed Mobility OT LTG, Assist Device bed rails  -MITALI      Bed Mobility OT LTG, Outcome goal ongoing  -MITALI      Transfer Training OT LTG    Transfer Training OT LTG, Date Established 10/05/17  -MITALI      Transfer Training OT LTG, Time to Achieve 2 wks  -MITALI      Transfer Training OT LTG, Activity Type sit to stand/stand to sit  -MITALI      Transfer Training OT LTG, [place] Level maximum assist (25% patient effort);2 person assist required  -MITALI      Transfer Training OT LTG, Assist  Device walker, rolling   vs UE support or other AD  -MITALI      Transfer Training OT LTG, Outcome goal ongoing  -MITALI      Strength OT STG    Strength Goal OT STG, Date Established 10/05/17  -MITALI      Strength Goal OT STG, Time to Achieve 1 wk  -MITALI      Strength Goal OT STG, Measure to Achieve Pt. complete each session AROM/T-band exer.  to support ADL and mobility indep.  -MITALI      Strength Goal OT STG, Outcome goal ongoing  -MITALI      Dynamic Sitting Balance OT STG    Dynamic Sitting Balance OT STG, Date Established 10/05/17  -MITALI      Dynamic Sitting Balance OT STG, Time to Achieve 1 wk  -MITALI      Dynamic Sitting Balance OT STG, [place] Level contact guard assist  -MITALI      Dynamic Sitting Balance OT STG, Assist Device UE Support  -MITALI      Dynamic Sitting Balance OT STG, Additional Goal 10 min  -MITALI      Dynamic Sitting Balance OT STG, Outcome goal ongoing  -MITALI      Static Standing Balance OT LTG    Static Standing Balance OT LTG, Date Established 10/05/17  -MITALI      Static Standing Balance OT LTG, Time to Achieve 2 wks  -MITALI      Static Standing Balance OT LTG, [place] Level maximum assist (25% patient effort);2 person assist required  -MITALI      Static Standing Balance OT LTG, Assist Device UE Support;assistive device  -MITALI      Static Standing Balance OT LTG, Additional Goal 10 sec  -MITALI      Static Standing Balance OT LTG, Outcome goal ongoing  -MITALI      Grooming OT STG    Grooming Goal OT STG, Date Established 10/05/17  -MITALI      Grooming Goal OT STG, Time to Achieve 1 wk  -MITALI      Grooming Goal OT STG, Activity Type wash face and hands, brush teeth,  -MITALI      Grooming Goal OT STG, [place] Level supervision required;set up required  -MITALI      Grooming Goal OT STG, Position sitting, edge of bed;sitting in chair  -MITALI      Grooming Goal OT STG, Outcome goal ongoing  -MITALI        User Key  (r) = Recorded By, (t) = Taken By, (c) = Cosigned By    Initials Name Provider Type    MITALI Holliday, OT Occupational Therapist                 Outcome Measures       10/05/17 1015          How much help from another is currently needed...    Putting on and taking off regular lower body clothing? 1  -MITALI      Bathing (including washing, rinsing, and drying) 1  -MITALI      Toileting (which includes using toilet bed pan or urinal) 1  -MITALI      Putting on and taking off regular upper body clothing 2  -MITALI      Taking care of personal grooming (such as brushing teeth) 3  -MITALI      Eating meals 3  -MITALI      Score 11  -MITALI      Functional Assessment    Outcome Measure Options AM-PAC 6 Clicks Daily Activity (OT)  -MITALI        User Key  (r) = Recorded By, (t) = Taken By, (c) = Cosigned By    Initials Name Provider Type    MITALI Holliday OT Occupational Therapist          Time Calculation:   OT Start Time: 1015    Therapy Charges for Today     Code Description Service Date Service Provider Modifiers Qty    [id number]  OT EVAL MOD COMPLEXITY 4 10/5/2017 Cristy Holliday OT GO 1    [id number]  OT THERAPEUTIC ACT EA 15 MIN 10/5/2017 Cristy Holliday OT GO 1               Cristy Holliday OT  10/5/2017

## 2017-10-05 NOTE — THERAPY EVALUATION
Acute Care - Physical Therapy Initial Evaluation   [place]     Patient Name: Joy Bueno  : 1950  MRN: [id number]  Today's Date: 10/5/2017   Onset of Illness/Injury or Date of Surgery Date: 10/04/17  Date of Referral to PT: 10/04/17  Referring Physician: SUELLEN Guerra      Admit Date: 10/4/2017     Visit Dx:    ICD-10-CM ICD-9-CM   1. Urinary tract infection associated with indwelling urethral catheter, initial encounter T83.511A 996.64    N39.0 599.0   2. Chronic renal failure, stage 4 (severe) N18.4 585.4   3. Morbid obesity with body mass index of 50.0-59.9 in adult E66.01 278.01    Z68.43 V85.43   4. Chronic anemia D64.9 285.9   5. Diarrhea in adult patient R19.7 787.91   6. Adult failure to thrive syndrome R62.7 783.7   7. Impaired mobility and ADLs Z74.09 799.89   8. Impaired functional mobility, balance, gait, and endurance Z74.09 V49.89     Patient Active Problem List   Diagnosis   • Asthma   • Diabetes mellitus   • Hypertension   • Symptomatic anemia   • Morbid obesity   • Anxiety   • Fatigue   • Anemia   • CKD (chronic kidney disease) Stage 3   • Gastritis   • Pleural effusion, right   • Leukocytosis   • Possible pneumonia of RML/RLL   • Acute cystitis with hematuria   • Hypokalemia   • Hypomagnesemia   • Diarrhea   • Urinary tract infection associated with indwelling urethral catheter   • Bilateral leg pain     Past Medical History:   Diagnosis Date   • Anemia    • Anxiety    • Asthma    • Diabetes mellitus    • Hypertension    • Morbid obesity    • Osteoarthritis      Past Surgical History:   Procedure Laterality Date   •  SECTION     • COLONOSCOPY N/A 2017    Procedure: COLONOSCOPY;  Surgeon: Mark I Brunner, MD;  Location:  [facility];  Service:    • ENDOSCOPY N/A 2017    Procedure: ESOPHAGOGASTRODUODENOSCOPY ;  Surgeon: Velasquez Call MD;  Location:  [facility];  Service:    • HERNIA REPAIR            PT ASSESSMENT (last 72 hours)      PT Evaluation        10/05/17 1015 10/05/17 1013    Rehab Evaluation    Document Type evaluation  -MITALI evaluation  -DM    Subjective Information agree to therapy;complains of;fatigue;pain;weakness  -MITALI agree to therapy;complains of;weakness;fatigue;pain;swelling   2attempts;WOCnsgRx.(crying out entireRx);pt.ref.rolling/EOB  -DM    Patient Effort, Rehab Treatment adequate  -MITALI fair  -DM    Symptoms Noted During/After Treatment increased pain  -MITALI increased pain  -DM    Symptoms Noted Comment increased pain if touch or moved LLE especially foot.  Pt. not allowing LE's to be moved.  -MITALI crying out if legs/feet touched or sheet moved,charmaine.LLE;incr.conf. w/ rambling speech/divergence to mult unrelated topics; per nsg,talks about 5 things at once; will put on wait list for lift room; specialty bed in hi (pt to switch from standard bed)  -DM    General Information    Patient Profile Review yes  -MITALI other (see comments)  -DM    Onset of Illness/Injury or Date of Surgery Date 10/04/17  -MITALI 10/04/17  -DM    Referring Physician SUELLEN Guerra APRN  -CHRIS    General Observations Pt. partially sitting up in bed.  Pt. with 02 in nose, but on room air 100%.  Specialty bed outside room for pt. to be moved to.  IV in R breast.  -MITALI Semi-shafer's in bed;tele;mendieta;IV in R breast;o2 cannula in nose but o2 turned off (sats 100%); B LE's resting on pillows (hips in ER);noted swelling in all extrem (cait.edema in feet); waff bjorn.  -DM    Pertinent History Of Current Problem Pt. with 3 admits in August.  8-3 to 8-17 for renal failure and to rehab.  Pt. with anemia and at first refused any testing.  Pt. then with EGD 8/25 and showed gastropathy and colonscopy showed diverticula and grade 1 internl hemorrhoids.  Pt. readmtted 9-2 to 9-7 for PNA and pleural effusions and back to [facility].  Per pt. she left there AMA and to home in which she has been stuck in her recliner for 7 days wearing brief unable to get to bathroom due to weakness, pain in LE's  and L>R SOA with activity.  Pt. found with acute cystitis and hematuia, anemia and diarrhea ruling out c-diff.    -MITALI h/o 3 adm.Aug.(8-3 to 8-17) for RF & to rehab; dx w/ anemia & init ref. diag.tests, then had EGD -25 (gastropathy) & colonoscopy showed diverticula & grade 1 int. hemorrhoids;hosp 9-2 to 9-7 w/ PNA & pleur.eff; sent back to [place] for rehab; per pt, left there AMA for home, & found sitting in recliner for 7-day stretch, wearing brief & unable to amb to BR d/t SOB, WEAKNESS, & leg pain; in ER, DX w/acute cystitis w/ hematuria, CRF, Adult FTT, anemia (HGB 6.9) & diarrhea (spore precaut; r/o ? C diff); ? P.E. r/o'd; CXR: CARDIOMEG. w/ bibasilar dz;H/O asthma, OA  -DM    Precautions/Limitations fall precautions;other (see comments)   spore precuations, limited skin integrity, pain LE min mvmt  -MITALI fall precautions;other (see comments)   sporePrecaut; impaired skin integrity;severe pain/neurop.LEs  -DM    Prior Level of Function independent:;feeding;grooming;dependent:;dressing;bathing;home management;cooking;cleaning;driving;shopping;gait;max assist:;transfer;bed mobility   pt. limited historian so question accuracy  -MITALI independent:;feeding;grooming;max assist:;transfer;bed mobility;dependent:;ADL's;dressing;bathing;home management;cooking;cleaning;driving;shopping;gait   poor historian(?accuracy);[facility]  -DM    Equipment Currently Used at Home walker, rolling;glucometer;oxygen;commode   limited historian so question accuracy.  -MITALI commode;walker, rolling;glucometer;oxygen   poor historian  -DM    Plans/Goals Discussed With patient;agreed upon  -MITALI patient;agreed upon  -DM    Risks Reviewed patient:;increased discomfort;change in vital signs;lines disloged  -MITALI patient:;increased discomfort;change in vital signs;lines disloged  -DM    Benefits Reviewed patient:;improve function;increase independence;increase strength;increase balance;increase knowledge  -MITALI patient:;improve  function;increase independence;increase strength;increase balance;increase knowledge  -DM    Barriers to Rehab medically complex;previous functional deficit;cognitive status;physical barrier   pt. does not appears to make rational dicisions.  -MITALI medically complex;previous functional deficit;cognitive status;physical barrier;family issues   conf.;inaccur.Historian;irrational reasoning;conflict w/Dtr  -DM    Living Environment    Lives With alone  -MITALI alone  -DM    Living Ar[facility]ements --   [facility]  [facility]   just d/[facility]'d [facility]  -DM    Home Accessibility --   unable to determine home setup from pt.  -MITALI --   Poor historian  -DM    Type of Financial/Environmental Concern  none  -DM    Transportation Available  ambulance  -DM    Living Environment Comment per pt. she could not get out of her chair and neighbor and grandtrs 18 yo and 18 yo were helping her get food, roll and change brief and wash up as could.  Did not appears safe setting for pt.  -MITALI per pt, neighbor would pray w/ her & along w/ 2 grddtrs (17 & 19) would wash pt, help turn in chair to change brief, obtain food, & asst PRN  -DM    Clinical Impression    Date of Referral to PT  10/04/17  -DM    PT Diagnosis  impaired funct mobil  -DM    Criteria for Skilled Therapeutic Interventions Met  yes;treatment indicated  -DM    Pathology/Pathophysiology Noted (Describe Specifically for Each System)  genitourinary  -DM    Impairments Found (describe specific impairments)  gait, locomotion, and balance  -DM    Rehab Potential  fair, will monitor progress closely  -DM    Vital Signs    Pre Systolic BP Rehab --   vitals stable throughout session with systolic BP in 130's  -MITALI 123  -DM    Pre Treatment Diastolic BP  54  -DM    Post Systolic BP Rehab  136  -DM    Post Treatment Diastolic BP  62  -DM    Pretreatment Heart Rate (beats/min)  96  -DM    Intratreatment Heart Rate (beats/min)  98  -DM    Posttreatment  Heart Rate (beats/min)  99  -DM    Pre SpO2 (%) 100  -MITALI 100  -DM    O2 Delivery Pre Treatment room air  -MITALI room air  -DM    Post SpO2 (%) 100  -MITALI 100  -DM    O2 Delivery Post Treatment room air  -MITALI room air  -DM    Pre Patient Position Supine  -MITALI Supine  -DM    Intra Patient Position Supine  -MITALI     Post Patient Position Supine  -MITALI Supine  -DM    Pain Assessment    Pain Assessment 0-10  -MITALI 0-10  -DM    Pain Score 4   LE's L >R, up to 10/10 if moves LE's  -MITALI 4  -DM    Post Pain Score 4  -MITALI 4   10/10 during rx.when LE'S touched/moved   -DM    Pain Type Chronic pain  -MITALI Chronic pain  -DM    Pain Location Leg  -MITALI Leg  -DM    Pain Orientation Right;Left  -MITALI Right;Left   L > R  -DM    Pain Descriptors  Aching  -DM    Pain Frequency  Constant/continuous  -DM    Patient's Stated Pain Goal  No pain  -DM    Pain Intervention(s) Ambulation/increased activity;Repositioned  -MITALI Ambulation/increased activity   Pain eased once ther ex finished  -DM    Response to Interventions tolerated  -MITALI poor tolerance  -DM    Vision Assessment/Intervention    Visual Impairment Comment appears functional with pt. locating items tray and lap and attending to therapist.  Not formally assessed.  -MITALI     Cognitive Assessment/Intervention    Current Cognitive/Communication Assessment impaired   scattered thoughts jumping year to year,   -MITALI impaired   rambling/divergent verbalizations  -DM    Orientation Status oriented to;person  -MITALI oriented to;person  -DM    Follows Commands/Answers Questions 100% of the time;able to follow single-step instructions;needs cueing;needs increased time;needs repetition  -MITALI 100% of the time;able to follow single-step instructions;needs cueing;needs increased time;needs repetition  -DM    Personal Safety moderate impairment;decreased insight to deficits;unaware of consequences of deficits;unaware of cognitive deficits  -MITALI moderate impairment;decreased awareness, need for assist;decreased awareness, need  "for safety;decreased insight to deficits  -DM    Personal Safety Interventions fall prevention program maintained;gait belt;nonskid shoes/slippers when out of bed   if up  -MITALI fall prevention program maintained  -DM    Short/Long Term Memory --   at least mild to mod impairement.  -MITALI     ROM (Range of Motion)    General ROM upper extremity range of motion deficits identified  -MITALI lower extremity range of motion deficits identified   ref. to allow testing hips/knees;Bank[place] hoang.(L by 90%,R 80%  -DM    General ROM Detail See PT LE, R shoulder only to 90 degrees PROM and hand 75% finger flexion R.  -MITALI     MMT (Manual Muscle Testing)    General MMT Assessment upper extremity strength deficits identified  -MITALI lower extremity strength deficits identified   ref.formal testing of hips/knees;[place]ankle 1/5; Rankle 1+/5  -DM    General MMT Assessment Detail Pt. with approx 4 to 4+/5 strength weaker proximally.  See PT LE.  -MITALI     Bed Mobility, Assessment/Treatment    Bed Mobility, Comment Pt. refusing any bed mobility due to causes LE pain up to 10/10 and due to weakness.  -MITALI ref. bed mob. d/t pain  -DM    Transfer Assessment/Treatment    Transfer, Comment Pt. declined due to pain and weakness. HgH only 6.9 pt to get blood.  -MITALI ref.  -DM    Gait Assessment/Treatment    Gait, [place] Level  not appropriate to assess  -DM    Motor Skills/Interventions    Additional Documentation Fine Motor Coordination Training (Group)  -MITALI     Therapy Exercises    Bilateral Lower Extremities  AROM:;10 reps;supine;ankle pumps/circles   refA/AROMankles(\"don't touch me;let me do it\");decl.hip/knee  -DM    Bilateral Upper Extremity AROM:;10 reps;supine;elbow flexion/extension;shoulder abduction/adduction;shoulder extension/flexion;shoulder horizontal abd/add;shoulder protraction/retraction;shoulder rolls/shrugs  -MITALI     BUE Resistance manual resistance- minimal   biceps and triceps  -MITALI     Positioning and Restraints    Pre-Treatment " Position in bed  -MITALI in bed  -DM    Post Treatment Position bed  -MITALI bed  -DM    In Bed supine;call light within reach;encouraged to call for assist;exit alarm on  -MITALI notified nsg;supine;call light within reach;encouraged to call for assist   pillows under LE's;req.ice,sode,straw;consult w/NSGre;mobil  -DM      10/05/17 0945 10/05/17 0830    Muscle Tone Assessment    Muscle Tone Assessment Bilateral Lower Extremities  -WA (r) JBA (t) WA (c) Bilateral Lower Extremities  -AC    Bilateral Lower Extremities Muscle Tone Assessment moderately decreased tone  -WA (r) JBA (t) WA (c) mildly decreased tone  -AC      10/04/17 2130 10/04/17 1700    General Information    Equipment Currently Used at Home  walker, rolling;glucometer;oxygen  -KT    Living Environment    Lives With  alone  -KT    Living Arrangements  residential facility  -KT    Home Accessibility  no concerns  -KT    Stair Railings at Home  none  -KT    Type of Financial/Environmental Concern  none  -KT    Transportation Available  ambulance  -KT    Muscle Tone Assessment    Muscle Tone Assessment Bilateral Lower Extremities  -PH     Bilateral Lower Extremities Muscle Tone Assessment mildly decreased tone  -PH       User Key  (r) = Recorded By, (t) = Taken By, (c) = Cosigned By    Initials Name Provider Type    MITALI Holliday, OT Occupational Therapist    CHRSI Pearce, PT Physical Therapist    KT Keara Sullivan, RN Registered Nurse    CHAUNCEY Garrett, Nursing Student Nursing Student    AC Rolanda Orellana, RN Registered Nurse    PH Joselin Conde, RN Registered Nurse    JULIETTE Castaneda, RN, Nursing Instructor Registered Nurse          Physical Therapy Education     Title: PT OT SLP Therapies (Active)     Topic: Physical Therapy (Active)     Point: Mobility training (Active)    Learning Progress Summary    Learner Readiness Method Response Comment Documented by Status   Patient Acceptance E,D NR  DM 10/05/17 1113 Active                Point: Home exercise program (Active)    Learning Progress Summary    Learner Readiness Method Response Comment Documented by Status   Patient Acceptance E,D NR  DM 10/05/17 1113 Active               Point: Body mechanics (Active)    Learning Progress Summary    Learner Readiness Method Response Comment Documented by Status   Patient Acceptance E,D NR  DM 10/05/17 1113 Active               Point: Precautions (Active)    Learning Progress Summary    Learner Readiness Method Response Comment Documented by Status   Patient Acceptance E,D NR  DM 10/05/17 1113 Active                      User Key     Initials Effective Dates Name Provider Type Discipline    DM 06/19/15 -  Nancy Pearce, PT Physical Therapist PT                PT Recommendation and Plan  Anticipated Discharge Disposition: placement for care  PT Frequency: daily  Plan of Care Review  Plan Of Care Reviewed With: patient  Progress: progress toward functional goals is gradual  Outcome Summary/Follow up Plan: Presents w/ evolving sympt, incl. UTI/cystitis w/ hematuria, low HGB (6.9) which may req. transfusion, adult FTT, weakness/neuropathy/ immobil. x 1 wk  s/p d/c AMA from SNF,  impaired skin integrity LE'S req.WOC intervention, ARF w/ edema all extrem, & impaired cognition/inability to focus on task; poor tolerance of  LE ROM exer, & refused bed mobil./EOB sitting d/t pain; once pt moves to lift room, will attempt lift transf to chair & pre-gt activ. ;recommend placement, d/t inability to care for self at home & impaired cognition (lives alone)             IP PT Goals       10/05/17 1115          Bed Mobility PT LTG    Bed Mobility PT LTG, Date Established 10/05/17  -DM      Bed Mobility PT LTG, Time to Achieve 2 wks  -DM      Bed Mobility PT LTG, Activity Type all bed mobility  -DM      Bed Mobility PT LTG, [place] Level maximum assist (25% patient effort);2 person assist required  -DM      Transfer Training PT LTG    Transfer Training PT LTG, Date  Established 10/05/17  -DM      Transfer Training PT LTG, Time to Achieve 2 wks  -DM      Transfer Training PT LTG, Activity Type sit to stand/stand to sit  -DM      Transfer Training PT LTG, [place] Level maximum assist (25% patient effort);2 person assist required   stable VS  -DM      Transfer Training PT LTG, Assist Device walker, rolling  -DM      Static Sitting Balance PT STG    Static Sitting Balance PT STG, Date Established 10/05/17  -DM      Static Sitting Balance PT STG, Time to Achieve 1 wk  -DM      Static Sitting Balance PT STG, [place] Level moderate assist (50% patient effort)  -DM      Dynamic Sitting Balance PT LTG    Dynamic Sitting Balance PT LTG, Date Established 10/05/17  -DM      Dynamic Sitting Balance PT LTG, Time to Achieve 2 wks  -DM      Dynamic Sitting Balance PT LTG, [place] Level moderate assist (50% patient effort);2 person assist required  -DM      Static Standing Balance PT LTG    Static Standing Balance PT LTG, Date Established 10/05/17  -DM      Static Standing Balance PT LTG, Time to Achieve 2 wks  -DM      Static Standing Balance PT LTG, [place] Level maximum assist (25% patient effort);2 person assist required  -DM      Static Standing Balance PT LTG, Assist Device assistive Device  -DM        User Key  (r) = Recorded By, (t) = Taken By, (c) = Cosigned By    Initials Name Provider Type    DM Nancy Pearce, PT Physical Therapist                Outcome Measures       10/05/17 1015 10/05/17 1013       How much help from another person do you currently need...    Turning from your back to your side while in flat bed without using bedrails?  1  -DM     Moving from lying on back to sitting on the side of a flat bed without bedrails?  1  -DM     Moving to and from a bed to a chair (including a wheelchair)?  1  -DM     Standing up from a chair using your arms (e.g., wheelchair, bedside chair)?  1  -DM     Climbing 3-5 steps with a railing?  1  -DM     To walk in  hospital room?  1  -DM     AM-PAC 6 Clicks Score  6  -DM     How much help from another is currently needed...    Putting on and taking off regular lower body clothing? 1  -MITALI      Bathing (including washing, rinsing, and drying) 1  -MITALI      Toileting (which includes using toilet bed pan or urinal) 1  -MITALI      Putting on and taking off regular upper body clothing 2  -MITALI      Taking care of personal grooming (such as brushing teeth) 3  -MITALI      Eating meals 3  -MITALI      Score 11  -MITALI      Functional Assessment    Outcome Measure Options AM-PAC 6 Clicks Daily Activity (OT)  -MITALI AM-PAC 6 Clicks Basic Mobility (PT)  -DM       User Key  (r) = Recorded By, (t) = Taken By, (c) = Cosigned By    Initials Name Provider Type    MITALI Holliday, OT Occupational Therapist    CHRIS Pearce, PT Physical Therapist           Time Calculation:         PT Charges       10/05/17 1115          Time Calculation    Start Time 1013  -DM      PT Received On 10/05/17  -DM      PT Goal Re-Cert Due Date 10/15/17  -DM      Time Calculation- PT    Total Timed Code Minutes- PT 17 minute(s)  -DM        User Key  (r) = Recorded By, (t) = Taken By, (c) = Cosigned By    Initials Name Provider Type    CHRIS Pearce, PT Physical Therapist          Therapy Charges for Today     Code Description Service Date Service Provider Modifiers Qty    95259643470 HC PT EVAL MOD COMPLEXITY 3 10/5/2017 Nancy Pearce, PT GP 1    57317680733 HC PT THER PROC EA 15 MIN 10/5/2017 Nancy Pearce, PT GP 1          PT G-Codes  Outcome Measure Options: AM-PAC 6 Clicks Daily Activity (OT)      Nancy Pearce, PT  10/5/2017

## 2017-10-05 NOTE — PROGRESS NOTES
"      HOSPITALIST DAILY PROGRESS NOTE    Chief Complaint: f/u for weakness, diarrhea,     Subjective   SUBJECTIVE/OVERNIGHT EVENTS   No acute events overnight, patient states that she is sore, does endorse generalized weakness, diarrhea still present    Review of Systems:  Gen-no fevers, no chills  CV-no chest pain, no palpitations  Resp-no cough, no dyspnea  GI-no N/V/D, no abd pain    Otherwise complete ROS is negative except as mentioned in the HPI.    Objective   OBJECTIVE   I have reviewed the vital signs.  /54  Pulse 100  Temp 99.4 °F (37.4 °C) (Oral)   Resp 20  Ht 66\" (167.6 cm)  Wt (!) 336 lb 6 oz (153 kg)  SpO2 100%  BMI 54.29 kg/m2    Physical Exam:  Gen-no acute distress, laying in bed  CV-RRR, S1 S2 normal, no m/r/g  Resp-non labored respirations, poor exam sec to body habitus  Abd-Obese, soft, NT, ND, +BS  Ext-bilateral LE edema  Neuro-A&Ox3, no focal deficits  Psych-appropriate mood and affect    Results:  I have reviewed the labs, culture data, radiology results, and diagnostic studies.    Results from last 7 days  Lab Units 10/05/17  0429 10/04/17  1338   WBC 10*3/mm3 12.29* 15.79*   HEMOGLOBIN g/dL 6.9* 7.8*   HEMATOCRIT % 20.7* 24.7*   PLATELETS 10*3/mm3 299 328       Results from last 7 days  Lab Units 10/05/17  0429   SODIUM mmol/L 141   POTASSIUM mmol/L 2.8*   CHLORIDE mmol/L 102   CO2 mmol/L 30.0   BUN mg/dL 39*   CREATININE mg/dL 1.70*   GLUCOSE mg/dL 95   CALCIUM mg/dL 7.5*     Culture Data:  Cultures:    Blood Culture   Date Value Ref Range Status   10/04/2017 No growth at less than 24 hours  Preliminary   10/04/2017 No growth at less than 24 hours  Preliminary     Radiology Results:  Imaging Results (last 24 hours)     Procedure Component Value Units Date/Time    XR Chest 1 View [[id number]] Collected:  10/04/17 1529     Updated:  10/04/17 1542    Narrative:       EXAMINATION: XR CHEST 1 VW-10/04/2017:      INDICATION: Weak/Dizzy/AMS, triage protocol.      COMPARISON: " 09/05/2017.     FINDINGS: The heart is large. There is patchy bibasilar airspace  disease. Aeration of the lungs has, however, improved slightly when  compared with 09/05/2017 and there are no new abnormalities.           Impression:       Cardiomegaly with bibasilar airspace disease, slightly  improved when compared with 09/05/2017.     D:  10/04/2017  E:  10/04/2017     This report was finalized on 10/4/2017 3:40 PM by Dr. Nate Smith MD.       NM Lung Ventilation Perfusion [[id number]]  (Abnormal) Collected:  10/04/17 1654     Updated:  10/04/17 1930    Narrative:       EXAM:    NM Lung Perfusion and Ventilation Scan    CLINICAL HISTORY:    67 years old, female; Anemia, unspecified; Morbid (severe) obesity due to   excess calories; Chronic kidney disease, stage 4 (severe); Urinary tract   infection, site not specified; Diarrhea, unspecified; Adult failure to thrive;   Body mass index (bmi) 50-59.9 , adult; Infection and inflammatory reaction due   to indwelling urethral catheter, initial encounter; Signs and symptoms;   Shortness of breath; Additional info: SOA, immobility elevated d dimer    TECHNIQUE:    Nuclear Medicine ventilation and perfusion images of the lungs were obtained   in multiple projections following inhalation of 19.50 mCi Xenon-133 gas and   injection of 5.04 mCi Tc99m MAA.    COMPARISON:    CR - XR CHEST 1 VW 10/4/2017 1:51:04 PM    FINDINGS:    Normal ventilation bilaterally with adequate washout and no significant areas   of trapping. Normal and homogeneous uptake of radiotracer throughout the lungs   on ventilation and perfusion scans.       Incidental note is made of marked cardiomegaly concordant with the chest   radiograph.    Impression:         Unremarkable study without evidence of pulmonary embolism.            THIS DOCUMENT HAS BEEN ELECTRONICALLY SIGNED BY LETHA CAMPOVERDE MD        I have reviewed the medications.    Assessment/Plan   ASSESSMENT/PLAN    Principal Problem:    Acute  cystitis with hematuria  Active Problems:    Asthma    Diabetes mellitus    Hypertension    Morbid obesity    Anxiety    Fatigue    Anemia    CKD (chronic kidney disease) Stage 3    Gastritis    Leukocytosis    Hypokalemia    Hypomagnesemia    Diarrhea    Urinary tract infection associated with indwelling urethral catheter    Bilateral leg pain    67 yof, morbidly oneset and chronically ill with multiple hospitalizations here this past month, she has a hx of CKD, HTN,  T2DM,recurrent UTI, anxiety. She presents to [facility] from home with generalized weakness/fatigue and diarrhea, currently admitted for UTI    Plan  - Patient has been on her recliner for 7 days due to weakness, etiology suspect just functional decline  - etiology of diarrhea unknown, currently r/o c diff, started on PO Vanc empirically  - urine suggestive of UTI with hematuria, now, s/p rocephin, vanc, zosyn, will  Just continue rocehone for now, d/c the rest, f/u cultures (grew ecoli on 8/3)    though d-dimer is elevated, V/Q scan is unremarkable, patient is not hypoxic, thus PE suspicon now low.  - suspected blood loss anemia,?hematuria,  Hg 6.9 this morning, she is s/p EGD 8/25 and C-scope 8/28 with no bleeding stigmata, continue to trend h&h q8hr will transfuse 2 units  - monitor and replete electrolytes  - continue ssi aA1C 5.4%  - renal function worse, resume IVF  - resume home rx  - PT/OT    Dispo: TBD, patient will likely need rehab again (had previously gone to [facility])    Paula Silver MD  10/05/17  9:29 AM

## 2017-10-06 LAB
ABO + RH BLD: NORMAL
ABO + RH BLD: NORMAL
ANION GAP SERPL CALCULATED.3IONS-SCNC: 5 MMOL/L (ref 3–11)
BACTERIA SPEC AEROBE CULT: NORMAL
BH BB BLOOD EXPIRATION DATE: NORMAL
BH BB BLOOD EXPIRATION DATE: NORMAL
BH BB BLOOD TYPE BARCODE: 7300
BH BB BLOOD TYPE BARCODE: 7300
BH BB DISPENSE STATUS: NORMAL
BH BB DISPENSE STATUS: NORMAL
BH BB PRODUCT CODE: NORMAL
BH BB PRODUCT CODE: NORMAL
BH BB UNIT NUMBER: NORMAL
BH BB UNIT NUMBER: NORMAL
BUN BLD-MCNC: 42 MG/DL (ref 9–23)
BUN/CREAT SERPL: 22.1 (ref 7–25)
CALCIUM SPEC-SCNC: 7.6 MG/DL (ref 8.7–10.4)
CHLORIDE SERPL-SCNC: 105 MMOL/L (ref 99–109)
CO2 SERPL-SCNC: 28 MMOL/L (ref 20–31)
CREAT BLD-MCNC: 1.9 MG/DL (ref 0.6–1.3)
DEPRECATED RDW RBC AUTO: 53.8 FL (ref 37–54)
ERYTHROCYTE [DISTWIDTH] IN BLOOD BY AUTOMATED COUNT: 16.6 % (ref 11.3–14.5)
GFR SERPL CREATININE-BSD FRML MDRD: 32 ML/MIN/1.73
GLUCOSE BLD-MCNC: 100 MG/DL (ref 70–100)
GLUCOSE BLDC GLUCOMTR-MCNC: 129 MG/DL (ref 70–130)
GLUCOSE BLDC GLUCOMTR-MCNC: 189 MG/DL (ref 70–130)
GLUCOSE BLDC GLUCOMTR-MCNC: 198 MG/DL (ref 70–130)
GLUCOSE BLDC GLUCOMTR-MCNC: 96 MG/DL (ref 70–130)
HCT VFR BLD AUTO: 24.9 % (ref 34.5–44)
HCT VFR BLD AUTO: 25.2 % (ref 34.5–44)
HCT VFR BLD AUTO: 26.1 % (ref 34.5–44)
HGB BLD-MCNC: 7.8 G/DL (ref 11.5–15.5)
HGB BLD-MCNC: 7.9 G/DL (ref 11.5–15.5)
HGB BLD-MCNC: 8.4 G/DL (ref 11.5–15.5)
MAGNESIUM SERPL-MCNC: 1.6 MG/DL (ref 1.3–2.7)
MCH RBC QN AUTO: 27.8 PG (ref 27–31)
MCHC RBC AUTO-ENTMCNC: 31.3 G/DL (ref 32–36)
MCV RBC AUTO: 88.6 FL (ref 80–99)
PLATELET # BLD AUTO: 276 10*3/MM3 (ref 150–450)
PMV BLD AUTO: 9.4 FL (ref 6–12)
POTASSIUM BLD-SCNC: 3.9 MMOL/L (ref 3.5–5.5)
RBC # BLD AUTO: 2.81 10*6/MM3 (ref 3.89–5.14)
SODIUM BLD-SCNC: 138 MMOL/L (ref 132–146)
UNIT  ABO: NORMAL
UNIT  ABO: NORMAL
UNIT  RH: NORMAL
UNIT  RH: NORMAL
WBC NRBC COR # BLD: 9.94 10*3/MM3 (ref 3.5–10.8)

## 2017-10-06 PROCEDURE — 85014 HEMATOCRIT: CPT | Performed by: INTERNAL MEDICINE

## 2017-10-06 PROCEDURE — 83735 ASSAY OF MAGNESIUM: CPT | Performed by: INTERNAL MEDICINE

## 2017-10-06 PROCEDURE — 82962 GLUCOSE BLOOD TEST: CPT

## 2017-10-06 PROCEDURE — 94640 AIRWAY INHALATION TREATMENT: CPT

## 2017-10-06 PROCEDURE — 99232 SBSQ HOSP IP/OBS MODERATE 35: CPT | Performed by: INTERNAL MEDICINE

## 2017-10-06 PROCEDURE — 94799 UNLISTED PULMONARY SVC/PX: CPT

## 2017-10-06 PROCEDURE — 25010000002 HEPARIN (PORCINE) PER 1000 UNITS: Performed by: NURSE PRACTITIONER

## 2017-10-06 PROCEDURE — 94760 N-INVAS EAR/PLS OXIMETRY 1: CPT

## 2017-10-06 PROCEDURE — 85027 COMPLETE CBC AUTOMATED: CPT | Performed by: INTERNAL MEDICINE

## 2017-10-06 PROCEDURE — 80048 BASIC METABOLIC PNL TOTAL CA: CPT | Performed by: INTERNAL MEDICINE

## 2017-10-06 PROCEDURE — 25010000002 MAGNESIUM SULFATE 2 GM/50ML SOLUTION: Performed by: NURSE PRACTITIONER

## 2017-10-06 PROCEDURE — 85018 HEMOGLOBIN: CPT | Performed by: INTERNAL MEDICINE

## 2017-10-06 PROCEDURE — 25010000002 CEFTRIAXONE PER 250 MG: Performed by: INTERNAL MEDICINE

## 2017-10-06 PROCEDURE — 97110 THERAPEUTIC EXERCISES: CPT

## 2017-10-06 RX ORDER — SODIUM CHLORIDE 9 MG/ML
75 INJECTION, SOLUTION INTRAVENOUS CONTINUOUS
Status: DISCONTINUED | OUTPATIENT
Start: 2017-10-06 | End: 2017-10-07

## 2017-10-06 RX ORDER — PANTOPRAZOLE SODIUM 40 MG/1
40 TABLET, DELAYED RELEASE ORAL
Status: DISCONTINUED | OUTPATIENT
Start: 2017-10-07 | End: 2017-10-12

## 2017-10-06 RX ADMIN — POTASSIUM CHLORIDE 40 MEQ: 750 CAPSULE, EXTENDED RELEASE ORAL at 00:24

## 2017-10-06 RX ADMIN — ALBUTEROL SULFATE 2.5 MG: 2.5 SOLUTION RESPIRATORY (INHALATION) at 18:23

## 2017-10-06 RX ADMIN — HYDRALAZINE HYDROCHLORIDE 75 MG: 25 TABLET ORAL at 21:04

## 2017-10-06 RX ADMIN — ATORVASTATIN CALCIUM 10 MG: 10 TABLET, FILM COATED ORAL at 21:04

## 2017-10-06 RX ADMIN — SODIUM CHLORIDE 75 ML/HR: 9 INJECTION, SOLUTION INTRAVENOUS at 21:04

## 2017-10-06 RX ADMIN — MAGNESIUM SULFATE HEPTAHYDRATE 2 G: 40 INJECTION, SOLUTION INTRAVENOUS at 09:30

## 2017-10-06 RX ADMIN — CASTOR OIL AND BALSAM, PERU: 788; 87 OINTMENT TOPICAL at 22:09

## 2017-10-06 RX ADMIN — CASTOR OIL AND BALSAM, PERU: 788; 87 OINTMENT TOPICAL at 09:24

## 2017-10-06 RX ADMIN — MULTIPLE VITAMINS W/ MINERALS TAB 1 TABLET: TAB ORAL at 09:23

## 2017-10-06 RX ADMIN — Medication 125 MG: at 00:24

## 2017-10-06 RX ADMIN — HYDROCODONE BITARTATE AND ACETAMINOPHEN 1 TABLET: 5; 325 TABLET ORAL at 02:55

## 2017-10-06 RX ADMIN — Medication 125 MG: at 23:49

## 2017-10-06 RX ADMIN — HYDROCODONE BITARTATE AND ACETAMINOPHEN 1 TABLET: 5; 325 TABLET ORAL at 17:02

## 2017-10-06 RX ADMIN — Medication 125 MG: at 05:35

## 2017-10-06 RX ADMIN — INSULIN LISPRO 2 UNITS: 100 INJECTION, SOLUTION INTRAVENOUS; SUBCUTANEOUS at 22:09

## 2017-10-06 RX ADMIN — CLONAZEPAM 0.25 MG: 0.5 TABLET ORAL at 22:09

## 2017-10-06 RX ADMIN — HYDRALAZINE HYDROCHLORIDE 75 MG: 25 TABLET ORAL at 17:01

## 2017-10-06 RX ADMIN — SIMETHICONE CHEW TAB 80 MG 80 MG: 80 TABLET ORAL at 17:03

## 2017-10-06 RX ADMIN — Medication 250 MG: at 09:23

## 2017-10-06 RX ADMIN — Medication 125 MG: at 17:01

## 2017-10-06 RX ADMIN — HYDROCODONE BITARTATE AND ACETAMINOPHEN 1 TABLET: 5; 325 TABLET ORAL at 21:04

## 2017-10-06 RX ADMIN — Medication 125 MG: at 12:45

## 2017-10-06 RX ADMIN — HEPARIN SODIUM 5000 UNITS: 5000 INJECTION, SOLUTION INTRAVENOUS; SUBCUTANEOUS at 21:04

## 2017-10-06 RX ADMIN — Medication 250 MG: at 17:02

## 2017-10-06 RX ADMIN — SODIUM CHLORIDE 75 ML/HR: 9 INJECTION, SOLUTION INTRAVENOUS at 12:44

## 2017-10-06 RX ADMIN — HEPARIN SODIUM 5000 UNITS: 5000 INJECTION, SOLUTION INTRAVENOUS; SUBCUTANEOUS at 05:34

## 2017-10-06 RX ADMIN — CEFTRIAXONE SODIUM 1 G: 1 INJECTION, SOLUTION INTRAVENOUS at 09:30

## 2017-10-06 RX ADMIN — HYDROCODONE BITARTATE AND ACETAMINOPHEN 1 TABLET: 5; 325 TABLET ORAL at 09:29

## 2017-10-06 RX ADMIN — HEPARIN SODIUM 5000 UNITS: 5000 INJECTION, SOLUTION INTRAVENOUS; SUBCUTANEOUS at 17:02

## 2017-10-06 NOTE — PLAN OF CARE
Problem: Patient Care Overview (Adult)  Goal: Plan of Care Review  Outcome: Ongoing (interventions implemented as appropriate)    10/05/17 1151 10/05/17 1800   Coping/Psychosocial Response Interventions   Plan Of Care Reviewed With --  patient   Patient Care Overview   Progress progress towards functional goals is fair --    Outcome Evaluation   Outcome Summary/Follow up Plan Pt. demonstrating evolving symptoms of weakness, pain and limited activity tolerance due to recent PNA, renal failure and anemia with current acute cystitis with hematuria, anemia and diarrhea. Pt. appears with some confusion switiching from one year to next in one sentence. Per pt. she left AMA from SNF question accuracy. Pt. does not appear able to care for self and if does not improve significantly cognitvely and physically soon would recommend ECF. --          Problem: Pain, Acute (Adult)  Goal: Identify Related Risk Factors and Signs and Symptoms  Outcome: Ongoing (interventions implemented as appropriate)    Problem: Fall Risk (Adult)  Goal: Identify Related Risk Factors and Signs and Symptoms  Outcome: Ongoing (interventions implemented as appropriate)    Problem: Pressure Ulcer Risk (Saurav Scale) (Adult,Obstetrics,Pediatric)  Goal: Identify Related Risk Factors and Signs and Symptoms  Outcome: Ongoing (interventions implemented as appropriate)    Problem: Skin Integrity Impairment, Risk/Actual (Adult)  Goal: Identify Related Risk Factors and Signs and Symptoms  Outcome: Ongoing (interventions implemented as appropriate)

## 2017-10-06 NOTE — THERAPY TREATMENT NOTE
Acute Care - Physical Therapy Treatment Note  [facility]     Patient Name: Joy Bueno  : 1950  MRN: [id number]  Today's Date: 10/6/2017  Onset of Illness/Injury or Date of Surgery Date: 10/04/17  Date of Referral to PT: 10/04/17  Referring Physician: SUELLEN Guerra    Admit Date: 10/4/2017    Visit Dx:    ICD-10-CM ICD-9-CM   1. Urinary tract infection associated with indwelling urethral catheter, initial encounter T83.511A 996.64    N39.0 599.0   2. Chronic renal failure, stage 4 (severe) N18.4 585.4   3. Morbid obesity with body mass index of 50.0-59.9 in adult E66.01 278.01    Z68.43 V85.43   4. Chronic anemia D64.9 285.9   5. Diarrhea in adult patient R19.7 787.91   6. Adult failure to thrive syndrome R62.7 783.7   7. Impaired mobility and ADLs Z74.09 799.89   8. Impaired functional mobility, balance, gait, and endurance Z74.09 V49.89     Patient Active Problem List   Diagnosis   • Asthma   • Diabetes mellitus   • Hypertension   • Symptomatic anemia   • Morbid obesity   • Anxiety   • Fatigue   • Anemia   • CKD (chronic kidney disease) Stage 3   • Gastritis   • Pleural effusion, right   • Leukocytosis   • Possible pneumonia of RML/RLL   • Acute cystitis with hematuria   • Hypokalemia   • Hypomagnesemia   • Diarrhea   • Urinary tract infection associated with indwelling urethral catheter   • Bilateral leg pain               Adult Rehabilitation Note       10/06/17 0850          Rehab Assessment/Intervention    Discipline physical therapy assistant  -UD      Document Type therapy note (daily note)  -UD      Subjective Information agree to therapy;complains of;weakness;pain  -UD      Patient Effort, Rehab Treatment fair  -UD      Symptoms Noted During/After Treatment increased pain  -UD      Symptoms Noted Comment --   to touch le's  -UD      Precautions/Limitations fall precautions  -UD      Recorded by [UD] Yazmin Good PTA      Vital Signs    O2 Delivery Post Treatment room air  -UD      Pre Patient  Position Supine  -UD      Intra Patient Position Supine  -UD      Post Patient Position Supine  -UD      Recorded by [UD] Yazmin Good PTA      Pain Assessment    Pain Assessment Ferrell-Hernández FACES  -UD      Ferrell-Hernández FACES Pain Rating 4  -UD      Pain Score 6  -UD      Post Pain Score 6  -UD      Pain Type Chronic pain  -UD      Pain Location Leg  -UD      Pain Orientation Right;Left  -UD      Pain Intervention(s) Repositioned  -UD      Recorded by [UD] Yazmin Good PTA      Cognitive Assessment/Intervention    Orientation Status oriented to;person  -UD      Follows Commands/Answers Questions 75% of the time  -UD      Personal Safety Interventions fall prevention program maintained  -UD      Recorded by [UD] Yazmin Good PTA      Bed Mobility, Assessment/Treatment    Bed Mob, Supine to Sit, [place] unable to perform  -UD      Recorded by [UD] Yazmin Good PTA      Transfer Assessment/Treatment    Transfers, Bed-Chair [place] dependent (less than 25% patient effort)  -UD      Recorded by [UD] Yazmin Good PTA      Gait Assessment/Treatment    Gait, [place] Level not appropriate to assess  -UD      Recorded by [UD] Yazmin Good PTA      Therapy Exercises    Bilateral Lower Extremities supine;PROM:;10 reps  -UD      Bilateral Upper Extremity AROM:;10 reps;supine  -UD      Recorded by [UD] Yazmin Good PTA      Positioning and Restraints    Pre-Treatment Position in bed  -UD      Post Treatment Position bed  -UD      In Bed notified nsg;supine;call light within reach;exit alarm on;side rails up x2  -UD      Recorded by [UD] Yazmin Good PTA        User Key  (r) = Recorded By, (t) = Taken By, (c) = Cosigned By    Initials Name Effective Dates    UD Yazmin Good PTA 06/22/15 -                 IP PT Goals       10/06/17 0933 10/05/17 1115       Bed Mobility PT LTG    Bed Mobility PT LTG, Date Established  10/05/17  -DM     Bed Mobility PT LTG, Time to Achieve  2 wks  -DM     Bed Mobility PT LTG,  Activity Type  all bed mobility  -DM     Bed Mobility PT LTG, [place] Level  maximum assist (25% patient effort);2 person assist required  -DM     Bed Mobility PT LTG, Outcome goal ongoing  -UD      Transfer Training PT LTG    Transfer Training PT LTG, Date Established  10/05/17  -DM     Transfer Training PT LTG, Time to Achieve  2 wks  -DM     Transfer Training PT LTG, Activity Type  sit to stand/stand to sit  -DM     Transfer Training PT LTG, [place] Level  maximum assist (25% patient effort);2 person assist required   stable VS  -DM     Transfer Training PT LTG, Assist Device  walker, rolling  -DM     Transfer Training PT LTG, Outcome goal ongoing  -UD      Static Sitting Balance PT STG    Static Sitting Balance PT STG, Date Established  10/05/17  -DM     Static Sitting Balance PT STG, Time to Achieve  1 wk  -DM     Static Sitting Balance PT STG, [place] Level  moderate assist (50% patient effort)  -DM     Static Sitting Balance PT STG, Outcome goal ongoing  -UD      Dynamic Sitting Balance PT LTG    Dynamic Sitting Balance PT LTG, Date Established  10/05/17  -DM     Dynamic Sitting Balance PT LTG, Time to Achieve  2 wks  -DM     Dynamic Sitting Balance PT LTG, [place] Level  moderate assist (50% patient effort);2 person assist required  -DM     Dynamic Sitting Balance PT LTG, Outcome goal ongoing  -UD      Static Standing Balance PT LTG    Static Standing Balance PT LTG, Date Established  10/05/17  -DM     Static Standing Balance PT LTG, Time to Achieve  2 wks  -DM     Static Standing Balance PT LTG, [place] Level  maximum assist (25% patient effort);2 person assist required  -DM     Static Standing Balance PT LTG, Assist Device  assistive Device  -DM     Static Standing Balance PT LTG, Outcome goal ongoing  -UD        User Key  (r) = Recorded By, (t) = Taken By, (c) = Cosigned By    Initials Name Provider Type    ADRIANE Good, PTA Physical Therapy Assistant    DM Nancy Pearce, PT  Physical Therapist          Physical Therapy Education     Title: PT OT SLP Therapies (Active)     Topic: Physical Therapy (Active)     Point: Mobility training (Active)    Learning Progress Summary    Learner Readiness Method Response Comment Documented by Status   Patient Acceptance E,D NR  UD 10/06/17 0933 Active    Acceptance E,D NR  DM 10/05/17 1113 Active               Point: Home exercise program (Active)    Learning Progress Summary    Learner Readiness Method Response Comment Documented by Status   Patient Acceptance E,D NR  UD 10/06/17 0933 Active    Acceptance E,D NR  DM 10/05/17 1113 Active               Point: Body mechanics (Active)    Learning Progress Summary    Learner Readiness Method Response Comment Documented by Status   Patient Acceptance E,D NR  UD 10/06/17 0933 Active    Acceptance E,D NR  DM 10/05/17 1113 Active               Point: Precautions (Active)    Learning Progress Summary    Learner Readiness Method Response Comment Documented by Status   Patient Acceptance E,D NR  UD 10/06/17 0933 Active    Acceptance E,D NR  DM 10/05/17 1113 Active                      User Key     Initials Effective Dates Name Provider Type Discipline     06/22/15 -  Yazmin Good PTA Physical Therapy Assistant PT    DM 06/19/15 -  Nancy Pearce PT Physical Therapist PT                    PT Recommendation and Plan  Anticipated Discharge Disposition: placement for care  PT Frequency: daily  Plan of Care Review  Plan Of Care Reviewed With: patient  Progress: no change  Outcome Summary/Follow up Plan: pt sore to touch le's.only able to tolerate minim.rom.did assist. with ue's.will need lift oob          Outcome Measures       10/06/17 0850 10/05/17 1015 10/05/17 1013    How much help from another person do you currently need...    Turning from your back to your side while in flat bed without using bedrails? 1  -UD  1  -DM    Moving from lying on back to sitting on the side of a flat bed without bedrails? 1   -UD  1  -DM    Moving to and from a bed to a chair (including a wheelchair)? 1  -UD  1  -DM    Standing up from a chair using your arms (e.g., wheelchair, bedside chair)? 1  -UD  1  -DM    Climbing 3-5 steps with a railing? 1  -UD  1  -DM    To walk in hospital room? 1  -UD  1  -DM    AM-PAC 6 Clicks Score 6  -UD  6  -DM    How much help from another is currently needed...    Putting on and taking off regular lower body clothing?  1  -MITALI     Bathing (including washing, rinsing, and drying)  1  -MITALI     Toileting (which includes using toilet bed pan or urinal)  1  -MITALI     Putting on and taking off regular upper body clothing  2  -MITALI     Taking care of personal grooming (such as brushing teeth)  3  -MITALI     Eating meals  3  -MITALI     Score  11  -MITALI     Functional Assessment    Outcome Measure Options  AM-PAC 6 Clicks Daily Activity (OT)  -MITALI AM-PAC 6 Clicks Basic Mobility (PT)  -DM      User Key  (r) = Recorded By, (t) = Taken By, (c) = Cosigned By    Initials Name Provider Type    MITALI Holliday, OT Occupational Therapist    ADRIANE Good PTA Physical Therapy Assistant    CHRIS Pearce, PT Physical Therapist           Time Calculation:         PT Charges       10/06/17 0935          Time Calculation    PT Received On 10/06/17  -      PT Goal Re-Cert Due Date 10/15/17  -UD      Time Calculation- PT    Total Timed Code Minutes- PT 15 minute(s)  -        User Key  (r) = Recorded By, (t) = Taken By, (c) = Cosigned By    Initials Name Provider Type    ADRIANE Good PTA Physical Therapy Assistant          Therapy Charges for Today     Code Description Service Date Service Provider Modifiers Qty    91543292134 HC PT THER PROC EA 15 MIN 10/6/2017 Yazmin Good PTA GP 1          PT G-Codes  Outcome Measure Options: AM-PAC 6 Clicks Daily Activity (OT)    Yazmin Good PTA  10/6/2017

## 2017-10-06 NOTE — PLAN OF CARE
Problem: Patient Care Overview (Adult)  Goal: Plan of Care Review  Outcome: Ongoing (interventions implemented as appropriate)    10/06/17 7479   Coping/Psychosocial Response Interventions   Plan Of Care Reviewed With patient   Patient Care Overview   Progress no change   Outcome Evaluation   Outcome Summary/Follow up Plan Patient transfered to lift room 473 to assist in mobility. Speciality bed obtained and now in use to prevent skin issues.          Problem: Pain, Acute (Adult)  Goal: Identify Related Risk Factors and Signs and Symptoms  Outcome: Ongoing (interventions implemented as appropriate)  Goal: Acceptable Pain Control/Comfort Level  Outcome: Ongoing (interventions implemented as appropriate)    Problem: Fall Risk (Adult)  Goal: Identify Related Risk Factors and Signs and Symptoms  Outcome: Ongoing (interventions implemented as appropriate)  Goal: Absence of Falls  Outcome: Ongoing (interventions implemented as appropriate)    Problem: Pressure Ulcer Risk (Saurav Scale) (Adult,Obstetrics,Pediatric)  Goal: Identify Related Risk Factors and Signs and Symptoms  Outcome: Ongoing (interventions implemented as appropriate)  Goal: Skin Integrity  Outcome: Ongoing (interventions implemented as appropriate)    Problem: Skin Integrity Impairment, Risk/Actual (Adult)  Goal: Identify Related Risk Factors and Signs and Symptoms  Outcome: Ongoing (interventions implemented as appropriate)  Goal: Skin Integrity/Wound Healing  Outcome: Ongoing (interventions implemented as appropriate)

## 2017-10-06 NOTE — PROGRESS NOTES
"      HOSPITALIST DAILY PROGRESS NOTE    Chief Complaint: f/u weakness and diarrhea    Subjective   SUBJECTIVE/OVERNIGHT EVENTS   No acute events overnight, patient laying in bed, eating lunch, she does endorse some heart burn    Review of Systems:  Gen-no fevers, no chills  CV-no chest pain, no palpitations  Resp-no cough, no dyspnea  GI-no N/V/D, no abd pain    Otherwise complete ROS is negative except as mentioned in the HPI.    Objective   OBJECTIVE   I have reviewed the vital signs.  /54 (BP Location: Left arm, Patient Position: Lying)  Pulse 90  Temp 97.8 °F (36.6 °C) (Oral)   Resp 18  Ht 66\" (167.6 cm)  Wt (!) 336 lb 6 oz (153 kg)  SpO2 97%  BMI 54.29 kg/m2    Physical Exam:  Gen-no acute distress, laying in bed comfortably  CV-RRR, S1 S2 normal, no m/r/g  Resp-CTAB, no wheezes, poor exam sec to body habitus  Abd-obese, soft, NT, ND, +BS  Ext-BLE  Edema, with chronic venous stasis changes  Neuro-A&Ox3, no focal deficits  Psych-appropriate mood and affect    Results:  I have reviewed the labs, culture data, radiology results, and diagnostic studies.      Results from last 7 days  Lab Units 10/06/17  0409 10/06/17  0009 10/05/17  0429 10/04/17  1338   WBC 10*3/mm3 9.94  --  12.29* 15.79*   HEMOGLOBIN g/dL 7.8* 7.9* 6.9* 7.8*   HEMATOCRIT % 24.9* 25.2* 20.7* 24.7*   PLATELETS 10*3/mm3 276  --  299 328       Results from last 7 days  Lab Units 10/06/17  0409   SODIUM mmol/L 138   POTASSIUM mmol/L 3.9   CHLORIDE mmol/L 105   CO2 mmol/L 28.0   BUN mg/dL 42*   CREATININE mg/dL 1.90*   GLUCOSE mg/dL 100   CALCIUM mg/dL 7.6*       Culture Data:  Cultures:    Blood Culture   Date Value Ref Range Status   10/04/2017 No growth at 24 hours  Preliminary   10/04/2017 Abnormal Stain (A)  Preliminary     Urine Culture   Date Value Ref Range Status   10/04/2017 Culture in progress  Preliminary     I have reviewed the medications.    Assessment/Plan   ASSESSMENT/PLAN    Principal Problem:    Acute cystitis with " hematuria  Active Problems:    Asthma    Diabetes mellitus    Hypertension    Morbid obesity    Anxiety    Fatigue    Anemia    CKD (chronic kidney disease) Stage 3    Gastritis    Leukocytosis    Hypokalemia    Hypomagnesemia    Diarrhea    Urinary tract infection associated with indwelling urethral catheter    Bilateral leg pain    67 yof, morbidly oneset and chronically ill with multiple hospitalizations here this past month, she has a hx of CKD, HTN,  T2DM,recurrent UTI, anxiety. She presents to [facility] from home with generalized weakness/fatigue and diarrhea, currently admitted for UTI     Plan  - Patient has been on her recliner for 7 days due to weakness, etiology suspect just functional decline  - Patients diarrhea sec to c diff, continue PO Vanc  - urine suggestive of UTI with hematuria, now, s/p rocephin, vanc, zosyn, will  Just continue rocephin, f/u cultures (grew ecoli on 8/3)    though d-dimer is elevated, V/Q scan is unremarkable, patient is not hypoxic, thus PE suspicon now low.  - suspected blood loss anemia,?hematuria,  Hg 6.9 10/5, she is s/p EGD 8/25 and C-scope 8/28 with no bleeding stigmata, continue to trend h&h q8hr s/p 2 units PRBC.  - monitor and replete electrolytes  - continue ssi , A1C 5.4%  - renal function worse but stable, resume IVF NS @75ml/hr  - resume home rx  - PT/OT     Dispo: TBD, patient will likely need rehab again (had previously gone to [facility])    Paula Silver MD  10/06/17  12:59 PM

## 2017-10-06 NOTE — PLAN OF CARE
Problem: Patient Care Overview (Adult)  Goal: Plan of Care Review  Outcome: Ongoing (interventions implemented as appropriate)    10/06/17 0933   Coping/Psychosocial Response Interventions   Plan Of Care Reviewed With patient   Patient Care Overview   Progress no change   Outcome Evaluation   Outcome Summary/Follow up Plan pt sore to touch le's.only able to tolerate minim.rom.did assist. with ue's.will need lift oob         Problem: Inpatient Physical Therapy  Goal: Bed Mobility Goal LTG- PT  Outcome: Ongoing (interventions implemented as appropriate)    10/05/17 1115 10/06/17 0933   Bed Mobility PT LTG   Bed Mobility PT LTG, Date Established 10/05/17 --    Bed Mobility PT LTG, Time to Achieve 2 wks --    Bed Mobility PT LTG, Activity Type all bed mobility --    Bed Mobility PT LTG, [place] Level maximum assist (25% patient effort);2 person assist required --    Bed Mobility PT LTG, Outcome --  goal ongoing       Goal: Transfer Training Goal 1 LTG- PT  Outcome: Ongoing (interventions implemented as appropriate)    10/05/17 1115 10/06/17 0933   Transfer Training PT LTG   Transfer Training PT LTG, Date Established 10/05/17 --    Transfer Training PT LTG, Time to Achieve 2 wks --    Transfer Training PT LTG, Activity Type sit to stand/stand to sit --    Transfer Training PT LTG, [place] Level maximum assist (25% patient effort);2 person assist required  (stable VS) --    Transfer Training PT LTG, Assist Device walker, rolling --    Transfer Training PT LTG, Outcome --  goal ongoing       Goal: Static Sitting Balance Goal STG- PT  Outcome: Ongoing (interventions implemented as appropriate)    10/05/17 1115 10/06/17 0933   Static Sitting Balance PT STG   Static Sitting Balance PT STG, Date Established 10/05/17 --    Static Sitting Balance PT STG, Time to Achieve 1 wk --    Static Sitting Balance PT STG, [place] Level moderate assist (50% patient effort) --    Static Sitting Balance PT STG, Outcome --  goal  ongoing       Goal: Dynamic Sitting Balance Goal LTG- PT  Outcome: Ongoing (interventions implemented as appropriate)    10/05/17 1115 10/06/17 0933   Dynamic Sitting Balance PT LTG   Dynamic Sitting Balance PT LTG, Date Established 10/05/17 --    Dynamic Sitting Balance PT LTG, Time to Achieve 2 wks --    Dynamic Sitting Balance PT LTG, [place] Level moderate assist (50% patient effort);2 person assist required --    Dynamic Sitting Balance PT LTG, Outcome --  goal ongoing       Goal: Static Standing Balance Goal LTG- PT  Outcome: Ongoing (interventions implemented as appropriate)    10/05/17 1115 10/06/17 0933   Static Standing Balance PT LTG   Static Standing Balance PT LTG, Date Established 10/05/17 --    Static Standing Balance PT LTG, Time to Achieve 2 wks --    Static Standing Balance PT LTG, [place] Level maximum assist (25% patient effort);2 person assist required --    Static Standing Balance PT LTG, Assist Device assistive Device --    Static Standing Balance PT LTG, Outcome --  goal ongoing

## 2017-10-07 LAB
ANION GAP SERPL CALCULATED.3IONS-SCNC: 8 MMOL/L (ref 3–11)
BACTERIA SPEC AEROBE CULT: ABNORMAL
BACTERIA SPEC AEROBE CULT: ABNORMAL
BUN BLD-MCNC: 44 MG/DL (ref 9–23)
BUN/CREAT SERPL: 23.2 (ref 7–25)
CALCIUM SPEC-SCNC: 7.7 MG/DL (ref 8.7–10.4)
CHLORIDE SERPL-SCNC: 108 MMOL/L (ref 99–109)
CO2 SERPL-SCNC: 24 MMOL/L (ref 20–31)
CREAT BLD-MCNC: 1.9 MG/DL (ref 0.6–1.3)
GFR SERPL CREATININE-BSD FRML MDRD: 32 ML/MIN/1.73
GLUCOSE BLD-MCNC: 140 MG/DL (ref 70–100)
GLUCOSE BLDC GLUCOMTR-MCNC: 121 MG/DL (ref 70–130)
GLUCOSE BLDC GLUCOMTR-MCNC: 159 MG/DL (ref 70–130)
GLUCOSE BLDC GLUCOMTR-MCNC: 173 MG/DL (ref 70–130)
GLUCOSE BLDC GLUCOMTR-MCNC: 186 MG/DL (ref 70–130)
HCT VFR BLD AUTO: 26.4 % (ref 34.5–44)
HGB BLD-MCNC: 8.2 G/DL (ref 11.5–15.5)
MAGNESIUM SERPL-MCNC: 1.9 MG/DL (ref 1.3–2.7)
POTASSIUM BLD-SCNC: 4.3 MMOL/L (ref 3.5–5.5)
SODIUM BLD-SCNC: 140 MMOL/L (ref 132–146)

## 2017-10-07 PROCEDURE — 83735 ASSAY OF MAGNESIUM: CPT | Performed by: INTERNAL MEDICINE

## 2017-10-07 PROCEDURE — 94799 UNLISTED PULMONARY SVC/PX: CPT

## 2017-10-07 PROCEDURE — 25010000002 CEFTRIAXONE PER 250 MG: Performed by: INTERNAL MEDICINE

## 2017-10-07 PROCEDURE — 94760 N-INVAS EAR/PLS OXIMETRY 1: CPT

## 2017-10-07 PROCEDURE — 80048 BASIC METABOLIC PNL TOTAL CA: CPT | Performed by: INTERNAL MEDICINE

## 2017-10-07 PROCEDURE — 25010000002 MORPHINE SULFATE (PF) 2 MG/ML SOLUTION: Performed by: INTERNAL MEDICINE

## 2017-10-07 PROCEDURE — 99232 SBSQ HOSP IP/OBS MODERATE 35: CPT | Performed by: INTERNAL MEDICINE

## 2017-10-07 PROCEDURE — 82962 GLUCOSE BLOOD TEST: CPT

## 2017-10-07 PROCEDURE — 25010000002 HEPARIN (PORCINE) PER 1000 UNITS: Performed by: NURSE PRACTITIONER

## 2017-10-07 PROCEDURE — 94640 AIRWAY INHALATION TREATMENT: CPT

## 2017-10-07 RX ORDER — BUDESONIDE AND FORMOTEROL FUMARATE DIHYDRATE 160; 4.5 UG/1; UG/1
2 AEROSOL RESPIRATORY (INHALATION)
Status: DISCONTINUED | OUTPATIENT
Start: 2017-10-08 | End: 2017-10-12 | Stop reason: HOSPADM

## 2017-10-07 RX ORDER — IPRATROPIUM BROMIDE AND ALBUTEROL SULFATE 2.5; .5 MG/3ML; MG/3ML
3 SOLUTION RESPIRATORY (INHALATION)
Status: DISCONTINUED | OUTPATIENT
Start: 2017-10-07 | End: 2017-10-12 | Stop reason: HOSPADM

## 2017-10-07 RX ORDER — MORPHINE SULFATE 2 MG/ML
2 INJECTION, SOLUTION INTRAMUSCULAR; INTRAVENOUS ONCE
Status: COMPLETED | OUTPATIENT
Start: 2017-10-07 | End: 2017-10-07

## 2017-10-07 RX ADMIN — Medication 125 MG: at 17:25

## 2017-10-07 RX ADMIN — Medication 125 MG: at 12:19

## 2017-10-07 RX ADMIN — INSULIN LISPRO 2 UNITS: 100 INJECTION, SOLUTION INTRAVENOUS; SUBCUTANEOUS at 17:25

## 2017-10-07 RX ADMIN — ATORVASTATIN CALCIUM 10 MG: 10 TABLET, FILM COATED ORAL at 21:16

## 2017-10-07 RX ADMIN — HEPARIN SODIUM 5000 UNITS: 5000 INJECTION, SOLUTION INTRAVENOUS; SUBCUTANEOUS at 13:54

## 2017-10-07 RX ADMIN — ALBUTEROL SULFATE 2.5 MG: 2.5 SOLUTION RESPIRATORY (INHALATION) at 09:49

## 2017-10-07 RX ADMIN — PANTOPRAZOLE SODIUM 40 MG: 40 TABLET, DELAYED RELEASE ORAL at 05:44

## 2017-10-07 RX ADMIN — HYDROCODONE BITARTATE AND ACETAMINOPHEN 1 TABLET: 5; 325 TABLET ORAL at 09:38

## 2017-10-07 RX ADMIN — CLONAZEPAM 0.25 MG: 0.5 TABLET ORAL at 21:16

## 2017-10-07 RX ADMIN — MORPHINE SULFATE 2 MG: 2 INJECTION, SOLUTION INTRAMUSCULAR; INTRAVENOUS at 10:37

## 2017-10-07 RX ADMIN — HYDRALAZINE HYDROCHLORIDE 75 MG: 25 TABLET ORAL at 09:38

## 2017-10-07 RX ADMIN — HYDRALAZINE HYDROCHLORIDE 75 MG: 25 TABLET ORAL at 21:16

## 2017-10-07 RX ADMIN — HEPARIN SODIUM 5000 UNITS: 5000 INJECTION, SOLUTION INTRAVENOUS; SUBCUTANEOUS at 21:16

## 2017-10-07 RX ADMIN — Medication 250 MG: at 17:25

## 2017-10-07 RX ADMIN — IPRATROPIUM BROMIDE AND ALBUTEROL SULFATE 3 ML: .5; 3 SOLUTION RESPIRATORY (INHALATION) at 13:00

## 2017-10-07 RX ADMIN — CEFTRIAXONE SODIUM 1 G: 1 INJECTION, SOLUTION INTRAVENOUS at 09:39

## 2017-10-07 RX ADMIN — Medication 250 MG: at 09:41

## 2017-10-07 RX ADMIN — HYDROCODONE BITARTATE AND ACETAMINOPHEN 1 TABLET: 5; 325 TABLET ORAL at 21:17

## 2017-10-07 RX ADMIN — Medication 125 MG: at 05:44

## 2017-10-07 RX ADMIN — HYDROCODONE BITARTATE AND ACETAMINOPHEN 1 TABLET: 5; 325 TABLET ORAL at 05:44

## 2017-10-07 RX ADMIN — HEPARIN SODIUM 5000 UNITS: 5000 INJECTION, SOLUTION INTRAVENOUS; SUBCUTANEOUS at 05:44

## 2017-10-07 RX ADMIN — CASTOR OIL AND BALSAM, PERU: 788; 87 OINTMENT TOPICAL at 09:40

## 2017-10-07 RX ADMIN — HYDRALAZINE HYDROCHLORIDE 75 MG: 25 TABLET ORAL at 17:25

## 2017-10-07 RX ADMIN — IPRATROPIUM BROMIDE AND ALBUTEROL SULFATE 3 ML: .5; 3 SOLUTION RESPIRATORY (INHALATION) at 16:42

## 2017-10-07 RX ADMIN — MULTIPLE VITAMINS W/ MINERALS TAB 1 TABLET: TAB ORAL at 09:38

## 2017-10-07 RX ADMIN — AMLODIPINE BESYLATE 5 MG: 5 TABLET ORAL at 09:38

## 2017-10-07 RX ADMIN — CASTOR OIL AND BALSAM, PERU: 788; 87 OINTMENT TOPICAL at 21:18

## 2017-10-07 RX ADMIN — ALBUTEROL SULFATE 2.5 MG: 2.5 SOLUTION RESPIRATORY (INHALATION) at 02:41

## 2017-10-07 RX ADMIN — ISOSORBIDE MONONITRATE 60 MG: 60 TABLET, EXTENDED RELEASE ORAL at 09:37

## 2017-10-07 RX ADMIN — INSULIN LISPRO 2 UNITS: 100 INJECTION, SOLUTION INTRAVENOUS; SUBCUTANEOUS at 21:17

## 2017-10-07 RX ADMIN — IPRATROPIUM BROMIDE AND ALBUTEROL SULFATE 3 ML: .5; 3 SOLUTION RESPIRATORY (INHALATION) at 19:21

## 2017-10-07 NOTE — PROGRESS NOTES
"      HOSPITALIST DAILY PROGRESS NOTE    Chief Complaint f/u weakness and diarrhea    Subjective   SUBJECTIVE/OVERNIGHT EVENTS   Patient states that she is feeling more SOA, denies any CP, no  n/v    Review of Systems:  Gen-no fevers, no chills  CV-no chest pain, no palpitations  Resp-+ cough, + dyspnea  GI-no N/V/D, no abd pain    Otherwise complete ROS is negative except as mentioned in the HPI.    Objective   OBJECTIVE   I have reviewed the vital signs.  /77 (BP Location: Right arm, Patient Position: Lying)  Pulse 95  Temp 98.3 °F (36.8 °C) (Oral)   Resp 20  Ht 66\" (167.6 cm)  Wt (!) 336 lb 6 oz (153 kg)  SpO2 99%  BMI 54.29 kg/m2    Physical Exam:  Gen-no acute distress, uncomfortable   CV-RRR, S1 S2 normal, no m/r/g  Resp-mod labored respirations, with wheezes, poor exam sec to body habitus.  Abd-obese, soft, NT, ND, +BS  Ext-BLE edema,  Neuro-A&Ox3, no focal deficits  Psych-appropriate mood and affect    Results:  I have reviewed the labs, culture data      Results from last 7 days  Lab Units 10/06/17  2341 10/06/17  1745 10/06/17  0409  10/05/17  0429 10/04/17  1338   WBC 10*3/mm3  --   --  9.94  --  12.29* 15.79*   HEMOGLOBIN g/dL 8.2* 8.4* 7.8*  < > 6.9* 7.8*   HEMATOCRIT % 26.4* 26.1* 24.9*  < > 20.7* 24.7*   PLATELETS 10*3/mm3  --   --  276  --  299 328   < > = values in this interval not displayed.    Results from last 7 days  Lab Units 10/07/17  0850   SODIUM mmol/L 140   POTASSIUM mmol/L 4.3   CHLORIDE mmol/L 108   CO2 mmol/L 24.0   BUN mg/dL 44*   CREATININE mg/dL 1.90*   GLUCOSE mg/dL 140*   CALCIUM mg/dL 7.7*     Culture Data:  Cultures:    Blood Culture   Date Value Ref Range Status   10/04/2017 No growth at 2 days  Preliminary   10/04/2017 Staphylococcus, coagulase negative (A)  Preliminary     Comment:     Suggests contamination  Susceptibility will not be done unless Doctor notifies Lab       Urine Culture   Date Value Ref Range Status   10/04/2017 40,000-50,000 CFU/mL Enterococcus " species (A)  Preliminary     Stool Culture   Date Value Ref Range Status   10/05/2017   Final    No Salmonella, Shigella, Campylobacter, E. coli O157, or Vibrio isolated     I have reviewed the medications.    Assessment/Plan   ASSESSMENT/PLAN    Principal Problem:    Acute cystitis with hematuria  Active Problems:    Asthma    Diabetes mellitus    Hypertension    Morbid obesity    Anxiety    Fatigue    Anemia    CKD (chronic kidney disease) Stage 3    Gastritis    Leukocytosis    Hypokalemia    Hypomagnesemia    Diarrhea    Urinary tract infection associated with indwelling urethral catheter    Bilateral leg pain    67 yof, morbidly oneset and chronically ill with multiple hospitalizations here this past month, she has a hx of CKD, HTN,  T2DM,recurrent UTI, anxiety. She presents to [facility] from home with generalized weakness/fatigue and diarrhea, currently admitted for UTI      Plan  - Urine suggestive of UTI with hematuria, continue rocephin, f/u cultures (grew ecoli on 8/3)   - Though d-dimer is elevated, V/Q scan is unremarkable, patient is not hypoxic, thus PE suspicon now low. Likely has OHS, will give duonebs.  - Patients diarrhea sec to c diff, continue PO Vanc  - Patient has been on her recliner for 7 days due to weakness, etiology suspect just functional decline  - Suspected blood loss anemia, source unknown, ?hematuria,  Hg 6.9 10/5, now stable  she is s/p EGD 8/25 and C-scope 8/28 with no bleeding stigmata, continue to trend h&h q8hr s/p 2 units PRBC.  - monitor and replete electrolytes  - continue ssi , A1C 5.4%  - renal function worse but stable, resume IVF NS @75ml/hr  - resume home rx  - PT/OT      Dispo: TBD, patient will likely need rehab again (had previously gone to [facility])    Paula Silver MD  10/07/17  10:17 AM

## 2017-10-07 NOTE — PLAN OF CARE
Problem: Patient Care Overview (Adult)  Goal: Plan of Care Review  Outcome: Ongoing (interventions implemented as appropriate)    10/07/17 2508   Coping/Psychosocial Response Interventions   Plan Of Care Reviewed With patient   Patient Care Overview   Progress no change   Outcome Evaluation   Outcome Summary/Follow up Plan vss. Patient unable to tolerate exercise and movement          Problem: Fall Risk (Adult)  Goal: Identify Related Risk Factors and Signs and Symptoms  Outcome: Ongoing (interventions implemented as appropriate)  Goal: Absence of Falls  Outcome: Ongoing (interventions implemented as appropriate)

## 2017-10-08 LAB
ANION GAP SERPL CALCULATED.3IONS-SCNC: 7 MMOL/L (ref 3–11)
BACTERIA UR QL AUTO: ABNORMAL /HPF
BILIRUB UR QL STRIP: NEGATIVE
BUN BLD-MCNC: 42 MG/DL (ref 9–23)
BUN/CREAT SERPL: 20 (ref 7–25)
CALCIUM SPEC-SCNC: 8.3 MG/DL (ref 8.7–10.4)
CHLORIDE SERPL-SCNC: 106 MMOL/L (ref 99–109)
CLARITY UR: ABNORMAL
CO2 SERPL-SCNC: 26 MMOL/L (ref 20–31)
COARSE GRAN CASTS URNS QL MICRO: ABNORMAL /LPF
COLOR UR: YELLOW
CREAT BLD-MCNC: 2.1 MG/DL (ref 0.6–1.3)
CREAT UR-MCNC: 63.1 MG/DL
GFR SERPL CREATININE-BSD FRML MDRD: 29 ML/MIN/1.73
GLUCOSE BLD-MCNC: 122 MG/DL (ref 70–100)
GLUCOSE BLDC GLUCOMTR-MCNC: 120 MG/DL (ref 70–130)
GLUCOSE BLDC GLUCOMTR-MCNC: 167 MG/DL (ref 70–130)
GLUCOSE BLDC GLUCOMTR-MCNC: 173 MG/DL (ref 70–130)
GLUCOSE BLDC GLUCOMTR-MCNC: 180 MG/DL (ref 70–130)
GLUCOSE UR STRIP-MCNC: NEGATIVE MG/DL
HCT VFR BLD AUTO: 25.9 % (ref 34.5–44)
HGB BLD-MCNC: 8 G/DL (ref 11.5–15.5)
HGB UR QL STRIP.AUTO: NEGATIVE
HYALINE CASTS UR QL AUTO: ABNORMAL /LPF
KETONES UR QL STRIP: NEGATIVE
LEUKOCYTE ESTERASE UR QL STRIP.AUTO: NEGATIVE
NITRITE UR QL STRIP: NEGATIVE
PH UR STRIP.AUTO: 5.5 [PH] (ref 5–8)
POTASSIUM BLD-SCNC: 4 MMOL/L (ref 3.5–5.5)
PROT UR QL STRIP: ABNORMAL
RBC # UR: ABNORMAL /HPF
REF LAB TEST METHOD: ABNORMAL
SODIUM BLD-SCNC: 139 MMOL/L (ref 132–146)
SODIUM UR-SCNC: 46 MMOL/L (ref 30–90)
SP GR UR STRIP: 1.02 (ref 1–1.03)
SQUAMOUS #/AREA URNS HPF: ABNORMAL /HPF
UROBILINOGEN UR QL STRIP: ABNORMAL
WBC UR QL AUTO: ABNORMAL /HPF

## 2017-10-08 PROCEDURE — 81001 URINALYSIS AUTO W/SCOPE: CPT | Performed by: INTERNAL MEDICINE

## 2017-10-08 PROCEDURE — 80048 BASIC METABOLIC PNL TOTAL CA: CPT | Performed by: INTERNAL MEDICINE

## 2017-10-08 PROCEDURE — 94640 AIRWAY INHALATION TREATMENT: CPT

## 2017-10-08 PROCEDURE — 25010000002 HEPARIN (PORCINE) PER 1000 UNITS: Performed by: NURSE PRACTITIONER

## 2017-10-08 PROCEDURE — 82962 GLUCOSE BLOOD TEST: CPT

## 2017-10-08 PROCEDURE — 25010000002 CEFTRIAXONE PER 250 MG: Performed by: INTERNAL MEDICINE

## 2017-10-08 PROCEDURE — 85014 HEMATOCRIT: CPT | Performed by: INTERNAL MEDICINE

## 2017-10-08 PROCEDURE — 85018 HEMOGLOBIN: CPT | Performed by: INTERNAL MEDICINE

## 2017-10-08 PROCEDURE — 84300 ASSAY OF URINE SODIUM: CPT | Performed by: INTERNAL MEDICINE

## 2017-10-08 PROCEDURE — 87086 URINE CULTURE/COLONY COUNT: CPT | Performed by: INTERNAL MEDICINE

## 2017-10-08 PROCEDURE — 94799 UNLISTED PULMONARY SVC/PX: CPT

## 2017-10-08 PROCEDURE — 94760 N-INVAS EAR/PLS OXIMETRY 1: CPT

## 2017-10-08 PROCEDURE — 99232 SBSQ HOSP IP/OBS MODERATE 35: CPT | Performed by: INTERNAL MEDICINE

## 2017-10-08 PROCEDURE — 63710000001 INSULIN LISPRO (HUMAN) PER 5 UNITS: Performed by: NURSE PRACTITIONER

## 2017-10-08 PROCEDURE — 82570 ASSAY OF URINE CREATININE: CPT | Performed by: INTERNAL MEDICINE

## 2017-10-08 RX ORDER — SODIUM CHLORIDE 9 MG/ML
75 INJECTION, SOLUTION INTRAVENOUS CONTINUOUS
Status: DISCONTINUED | OUTPATIENT
Start: 2017-10-08 | End: 2017-10-10

## 2017-10-08 RX ADMIN — ALBUTEROL SULFATE 2.5 MG: 2.5 SOLUTION RESPIRATORY (INHALATION) at 00:25

## 2017-10-08 RX ADMIN — AMLODIPINE BESYLATE 5 MG: 5 TABLET ORAL at 09:33

## 2017-10-08 RX ADMIN — HYDROCODONE BITARTATE AND ACETAMINOPHEN 1 TABLET: 5; 325 TABLET ORAL at 09:33

## 2017-10-08 RX ADMIN — ATORVASTATIN CALCIUM 10 MG: 10 TABLET, FILM COATED ORAL at 21:18

## 2017-10-08 RX ADMIN — Medication 125 MG: at 05:30

## 2017-10-08 RX ADMIN — IPRATROPIUM BROMIDE AND ALBUTEROL SULFATE 3 ML: .5; 3 SOLUTION RESPIRATORY (INHALATION) at 12:46

## 2017-10-08 RX ADMIN — HYDROCODONE BITARTATE AND ACETAMINOPHEN 1 TABLET: 5; 325 TABLET ORAL at 21:22

## 2017-10-08 RX ADMIN — SODIUM CHLORIDE 100 ML/HR: 9 INJECTION, SOLUTION INTRAVENOUS at 09:56

## 2017-10-08 RX ADMIN — BUDESONIDE AND FORMOTEROL FUMARATE DIHYDRATE 2 PUFF: 160; 4.5 AEROSOL RESPIRATORY (INHALATION) at 19:33

## 2017-10-08 RX ADMIN — IPRATROPIUM BROMIDE AND ALBUTEROL SULFATE 3 ML: .5; 3 SOLUTION RESPIRATORY (INHALATION) at 15:31

## 2017-10-08 RX ADMIN — Medication 125 MG: at 17:07

## 2017-10-08 RX ADMIN — PANTOPRAZOLE SODIUM 40 MG: 40 TABLET, DELAYED RELEASE ORAL at 05:29

## 2017-10-08 RX ADMIN — CASTOR OIL AND BALSAM, PERU: 788; 87 OINTMENT TOPICAL at 21:28

## 2017-10-08 RX ADMIN — ISOSORBIDE MONONITRATE 60 MG: 60 TABLET, EXTENDED RELEASE ORAL at 09:33

## 2017-10-08 RX ADMIN — HYDROCODONE BITARTATE AND ACETAMINOPHEN 1 TABLET: 5; 325 TABLET ORAL at 04:06

## 2017-10-08 RX ADMIN — Medication 250 MG: at 09:33

## 2017-10-08 RX ADMIN — HYDRALAZINE HYDROCHLORIDE 75 MG: 25 TABLET ORAL at 21:19

## 2017-10-08 RX ADMIN — HEPARIN SODIUM 5000 UNITS: 5000 INJECTION, SOLUTION INTRAVENOUS; SUBCUTANEOUS at 05:29

## 2017-10-08 RX ADMIN — ALBUTEROL SULFATE 2.5 MG: 2.5 SOLUTION RESPIRATORY (INHALATION) at 23:31

## 2017-10-08 RX ADMIN — IPRATROPIUM BROMIDE AND ALBUTEROL SULFATE 3 ML: .5; 3 SOLUTION RESPIRATORY (INHALATION) at 19:33

## 2017-10-08 RX ADMIN — HEPARIN SODIUM 5000 UNITS: 5000 INJECTION, SOLUTION INTRAVENOUS; SUBCUTANEOUS at 21:18

## 2017-10-08 RX ADMIN — Medication 125 MG: at 00:16

## 2017-10-08 RX ADMIN — HYDRALAZINE HYDROCHLORIDE 75 MG: 25 TABLET ORAL at 09:33

## 2017-10-08 RX ADMIN — INSULIN LISPRO 2 UNITS: 100 INJECTION, SOLUTION INTRAVENOUS; SUBCUTANEOUS at 12:38

## 2017-10-08 RX ADMIN — CASTOR OIL AND BALSAM, PERU: 788; 87 OINTMENT TOPICAL at 09:34

## 2017-10-08 RX ADMIN — ESCITALOPRAM OXALATE 10 MG: 10 TABLET ORAL at 09:33

## 2017-10-08 RX ADMIN — INSULIN LISPRO 2 UNITS: 100 INJECTION, SOLUTION INTRAVENOUS; SUBCUTANEOUS at 16:57

## 2017-10-08 RX ADMIN — HYDRALAZINE HYDROCHLORIDE 75 MG: 25 TABLET ORAL at 16:56

## 2017-10-08 RX ADMIN — CEFTRIAXONE SODIUM 1 G: 1 INJECTION, SOLUTION INTRAVENOUS at 09:34

## 2017-10-08 RX ADMIN — IPRATROPIUM BROMIDE AND ALBUTEROL SULFATE 3 ML: .5; 3 SOLUTION RESPIRATORY (INHALATION) at 07:36

## 2017-10-08 RX ADMIN — Medication 250 MG: at 17:07

## 2017-10-08 RX ADMIN — HYDROCODONE BITARTATE AND ACETAMINOPHEN 1 TABLET: 5; 325 TABLET ORAL at 17:07

## 2017-10-08 RX ADMIN — HEPARIN SODIUM 5000 UNITS: 5000 INJECTION, SOLUTION INTRAVENOUS; SUBCUTANEOUS at 14:33

## 2017-10-08 RX ADMIN — Medication 125 MG: at 12:38

## 2017-10-08 RX ADMIN — MULTIPLE VITAMINS W/ MINERALS TAB 1 TABLET: TAB ORAL at 09:32

## 2017-10-08 RX ADMIN — BUDESONIDE AND FORMOTEROL FUMARATE DIHYDRATE 2 PUFF: 160; 4.5 AEROSOL RESPIRATORY (INHALATION) at 07:36

## 2017-10-08 RX ADMIN — INSULIN LISPRO 2 UNITS: 100 INJECTION, SOLUTION INTRAVENOUS; SUBCUTANEOUS at 21:22

## 2017-10-08 RX ADMIN — CLONAZEPAM 0.25 MG: 0.5 TABLET ORAL at 21:21

## 2017-10-08 NOTE — PLAN OF CARE
Problem: Patient Care Overview (Adult)  Goal: Plan of Care Review  Outcome: Ongoing (interventions implemented as appropriate)    10/07/17 1645 10/08/17 0830   Coping/Psychosocial Response Interventions   Plan Of Care Reviewed With --  patient   Patient Care Overview   Progress no change --    Outcome Evaluation   Outcome Summary/Follow up Plan vss. Patient unable to tolerate exercise and movement  --          Problem: Pain, Acute (Adult)  Goal: Identify Related Risk Factors and Signs and Symptoms  Outcome: Ongoing (interventions implemented as appropriate)  Goal: Acceptable Pain Control/Comfort Level  Outcome: Ongoing (interventions implemented as appropriate)    Problem: Fall Risk (Adult)  Goal: Identify Related Risk Factors and Signs and Symptoms  Outcome: Ongoing (interventions implemented as appropriate)  Goal: Absence of Falls  Outcome: Ongoing (interventions implemented as appropriate)    Problem: Pressure Ulcer Risk (Saurav Scale) (Adult,Obstetrics,Pediatric)  Goal: Identify Related Risk Factors and Signs and Symptoms  Outcome: Ongoing (interventions implemented as appropriate)  Goal: Skin Integrity  Outcome: Ongoing (interventions implemented as appropriate)    Problem: Skin Integrity Impairment, Risk/Actual (Adult)  Goal: Skin Integrity/Wound Healing  Outcome: Ongoing (interventions implemented as appropriate)

## 2017-10-08 NOTE — SIGNIFICANT NOTE
RN called to report pt anxious. Was using max PRN duoneb before and wanting. SOA but sats upper 90s on same 2L.    Duoneb was changed to QID scheduled. Will leave this as the anticholinergic new. Add extra albuterol PRN.    Does have asthma, hx tobacco. On an LABA+ICS higher dose at home which didn't get continued and we do not stock dulera, entered in higher dose symbicort to replace with first dose now.    RN to do teaching with pt on all.    Please f/u as needed.

## 2017-10-08 NOTE — PROGRESS NOTES
"      HOSPITALIST DAILY PROGRESS NOTE    Chief Complaint: f/u weakness and diarrhea    Subjective   SUBJECTIVE/OVERNIGHT EVENTS   No acute events overnight, patient states that she had a better night, breathing is improved, she does endorse a cough, diarrhea is better    Review of Systems:  Gen-no fevers, no chills  CV-no chest pain, no palpitations  Resp-+ cough, no dyspnea  GI-no N/V/D, no abd pain    Otherwise complete ROS is negative except as mentioned in the HPI.    Objective   OBJECTIVE   I have reviewed the vital signs.  /92  Pulse 90  Temp 98.8 °F (37.1 °C) (Oral)   Resp 20  Ht 66\" (167.6 cm)  Wt (!) 336 lb 6 oz (153 kg)  SpO2 99%  BMI 54.29 kg/m2    Physical Exam:  Gen-no acute distress, seated in bed comfortable  CV-RRR, S1 S2 normal, no m/r/g  Resp-CTAB, no wheezes  Abd-obese,, soft, NT, ND, +BS  Ext-BLE edema, with chronic venous stasis changes  Neuro-A&Ox3, no focal deficits  Psych-appropriate mood and affect    Results:  I have reviewed the labs, culture data.    Results from last 7 days  Lab Units 10/08/17  0834 10/06/17  2341 10/06/17  1745 10/06/17  0409  10/05/17  0429 10/04/17  1338   WBC 10*3/mm3  --   --   --  9.94  --  12.29* 15.79*   HEMOGLOBIN g/dL 8.0* 8.2* 8.4* 7.8*  < > 6.9* 7.8*   HEMATOCRIT % 25.9* 26.4* 26.1* 24.9*  < > 20.7* 24.7*   PLATELETS 10*3/mm3  --   --   --  276  --  299 328   < > = values in this interval not displayed.    Results from last 7 days  Lab Units 10/08/17  0834   SODIUM mmol/L 139   POTASSIUM mmol/L 4.0   CHLORIDE mmol/L 106   CO2 mmol/L 26.0   BUN mg/dL 42*   CREATININE mg/dL 2.10*   GLUCOSE mg/dL 122*   CALCIUM mg/dL 8.3*     Culture Data:  Cultures:    Blood Culture   Date Value Ref Range Status   10/04/2017 No growth at 3 days  Preliminary   10/04/2017 Staphylococcus, coagulase negative (A)  Preliminary     Comment:     Suggests contamination  Susceptibility will not be done unless Doctor notifies Lab       Urine Culture   Date Value Ref Range " Status   10/04/2017 40,000-50,000 CFU/mL Enterococcus faecalis (A)  Final     Stool Culture   Date Value Ref Range Status   10/05/2017   Final    No Salmonella, Shigella, Campylobacter, E. coli O157, or Vibrio isolated     I have reviewed the medications.    Assessment/Plan   ASSESSMENT/PLAN    Principal Problem:    Acute cystitis with hematuria  Active Problems:    Asthma    Diabetes mellitus    Hypertension    Morbid obesity    Anxiety    Fatigue    Anemia    CKD (chronic kidney disease) Stage 3    Gastritis    Leukocytosis    Hypokalemia    Hypomagnesemia    Diarrhea    Urinary tract infection associated with indwelling urethral catheter    Bilateral leg pain    67 yof, morbidly obese and chronically ill with multiple hospitalizations here this past month, she has a hx of CKD, HTN,  T2DM,recurrent UTIs, anxiety. She presents to [facility] from home with generalized weakness/fatigue and diarrhea, currently admitted for UTI and anemia      Plan  - Urine suggestive of UTI with hematuria, continue rocephin, f/u cultures (grew ecoli on 8/3)   - Though d-dimer is elevated, V/Q scan is unremarkable, patient is not hypoxic, thus PE suspicon now low. Likely has OHS, will give duonebs continue O2 supp  - Patients diarrhea is sec to c diff, continue PO Vanc  - Patient has been on her recliner for ~7 days due to weakness, etiology suspect just functional decline  - Suspected blood loss anemia, source unknown, ?hematuria,  Hg 6.9 10/5, now stable  she is s/p EGD 8/25 and C-scope 8/28 with no bleeding stigmata, continue to trend h&h , s/p 2 units PRBC.  - monitor and replete electrolytes  - continue ssi , A1C 5.4%  - renal function worse, resume IVF NS @ 100ml/hr, will resend UA, urine lytes, then consider renal consult  - resume home rx  - PT/OT      Dispo: TBD, patient will likely need rehab again (had previously gone to [facility]), CM following     Paula Silver MD  10/08/17  9:41 AM

## 2017-10-09 PROBLEM — R53.1 GENERALIZED WEAKNESS: Status: ACTIVE | Noted: 2017-10-09

## 2017-10-09 PROBLEM — A04.72 CLOSTRIDIUM DIFFICILE DIARRHEA: Status: ACTIVE | Noted: 2017-10-04

## 2017-10-09 LAB
ANION GAP SERPL CALCULATED.3IONS-SCNC: 5 MMOL/L (ref 3–11)
BACTERIA SPEC AEROBE CULT: NORMAL
BUN BLD-MCNC: 40 MG/DL (ref 9–23)
BUN/CREAT SERPL: 20 (ref 7–25)
CALCIUM SPEC-SCNC: 8 MG/DL (ref 8.7–10.4)
CHLORIDE SERPL-SCNC: 112 MMOL/L (ref 99–109)
CO2 SERPL-SCNC: 26 MMOL/L (ref 20–31)
CREAT BLD-MCNC: 2 MG/DL (ref 0.6–1.3)
GFR SERPL CREATININE-BSD FRML MDRD: 30 ML/MIN/1.73
GLUCOSE BLD-MCNC: 161 MG/DL (ref 70–100)
GLUCOSE BLDC GLUCOMTR-MCNC: 124 MG/DL (ref 70–130)
GLUCOSE BLDC GLUCOMTR-MCNC: 156 MG/DL (ref 70–130)
GLUCOSE BLDC GLUCOMTR-MCNC: 162 MG/DL (ref 70–130)
GLUCOSE BLDC GLUCOMTR-MCNC: 172 MG/DL (ref 70–130)
HCT VFR BLD AUTO: 26 % (ref 34.5–44)
HGB BLD-MCNC: 8 G/DL (ref 11.5–15.5)
POTASSIUM BLD-SCNC: 4.3 MMOL/L (ref 3.5–5.5)
SODIUM BLD-SCNC: 143 MMOL/L (ref 132–146)

## 2017-10-09 PROCEDURE — 85014 HEMATOCRIT: CPT | Performed by: INTERNAL MEDICINE

## 2017-10-09 PROCEDURE — 25010000002 CEFTRIAXONE PER 250 MG: Performed by: INTERNAL MEDICINE

## 2017-10-09 PROCEDURE — 94799 UNLISTED PULMONARY SVC/PX: CPT

## 2017-10-09 PROCEDURE — 80048 BASIC METABOLIC PNL TOTAL CA: CPT | Performed by: HOSPITALIST

## 2017-10-09 PROCEDURE — 25010000002 ONDANSETRON PER 1 MG: Performed by: NURSE PRACTITIONER

## 2017-10-09 PROCEDURE — 82962 GLUCOSE BLOOD TEST: CPT

## 2017-10-09 PROCEDURE — 25010000002 HEPARIN (PORCINE) PER 1000 UNITS: Performed by: NURSE PRACTITIONER

## 2017-10-09 PROCEDURE — 94760 N-INVAS EAR/PLS OXIMETRY 1: CPT

## 2017-10-09 PROCEDURE — 97530 THERAPEUTIC ACTIVITIES: CPT

## 2017-10-09 PROCEDURE — 97110 THERAPEUTIC EXERCISES: CPT

## 2017-10-09 PROCEDURE — 99233 SBSQ HOSP IP/OBS HIGH 50: CPT | Performed by: HOSPITALIST

## 2017-10-09 PROCEDURE — 85018 HEMOGLOBIN: CPT | Performed by: INTERNAL MEDICINE

## 2017-10-09 RX ORDER — AMPICILLIN 500 MG/1
500 CAPSULE ORAL EVERY 12 HOURS SCHEDULED
Status: COMPLETED | OUTPATIENT
Start: 2017-10-09 | End: 2017-10-12

## 2017-10-09 RX ADMIN — AMLODIPINE BESYLATE 5 MG: 5 TABLET ORAL at 09:40

## 2017-10-09 RX ADMIN — ONDANSETRON 4 MG: 2 INJECTION INTRAMUSCULAR; INTRAVENOUS at 09:38

## 2017-10-09 RX ADMIN — INSULIN LISPRO 2 UNITS: 100 INJECTION, SOLUTION INTRAVENOUS; SUBCUTANEOUS at 12:57

## 2017-10-09 RX ADMIN — ISOSORBIDE MONONITRATE 60 MG: 60 TABLET, EXTENDED RELEASE ORAL at 09:39

## 2017-10-09 RX ADMIN — ATORVASTATIN CALCIUM 10 MG: 10 TABLET, FILM COATED ORAL at 21:19

## 2017-10-09 RX ADMIN — IPRATROPIUM BROMIDE AND ALBUTEROL SULFATE 3 ML: .5; 3 SOLUTION RESPIRATORY (INHALATION) at 07:34

## 2017-10-09 RX ADMIN — INSULIN LISPRO 2 UNITS: 100 INJECTION, SOLUTION INTRAVENOUS; SUBCUTANEOUS at 17:08

## 2017-10-09 RX ADMIN — CEFTRIAXONE SODIUM 1 G: 1 INJECTION, SOLUTION INTRAVENOUS at 09:39

## 2017-10-09 RX ADMIN — HYDRALAZINE HYDROCHLORIDE 75 MG: 25 TABLET ORAL at 09:40

## 2017-10-09 RX ADMIN — IPRATROPIUM BROMIDE AND ALBUTEROL SULFATE 3 ML: .5; 3 SOLUTION RESPIRATORY (INHALATION) at 19:26

## 2017-10-09 RX ADMIN — Medication 125 MG: at 12:58

## 2017-10-09 RX ADMIN — HYDRALAZINE HYDROCHLORIDE 75 MG: 25 TABLET ORAL at 15:04

## 2017-10-09 RX ADMIN — BUDESONIDE AND FORMOTEROL FUMARATE DIHYDRATE 2 PUFF: 160; 4.5 AEROSOL RESPIRATORY (INHALATION) at 07:36

## 2017-10-09 RX ADMIN — AMPICILLIN 500 MG: 500 CAPSULE ORAL at 15:04

## 2017-10-09 RX ADMIN — IPRATROPIUM BROMIDE AND ALBUTEROL SULFATE 3 ML: .5; 3 SOLUTION RESPIRATORY (INHALATION) at 16:33

## 2017-10-09 RX ADMIN — BUDESONIDE AND FORMOTEROL FUMARATE DIHYDRATE 2 PUFF: 160; 4.5 AEROSOL RESPIRATORY (INHALATION) at 19:27

## 2017-10-09 RX ADMIN — SODIUM CHLORIDE 75 ML/HR: 9 INJECTION, SOLUTION INTRAVENOUS at 17:06

## 2017-10-09 RX ADMIN — IPRATROPIUM BROMIDE AND ALBUTEROL SULFATE 3 ML: .5; 3 SOLUTION RESPIRATORY (INHALATION) at 12:29

## 2017-10-09 RX ADMIN — CLONAZEPAM 0.25 MG: 0.5 TABLET ORAL at 21:18

## 2017-10-09 RX ADMIN — ONDANSETRON 4 MG: 2 INJECTION INTRAMUSCULAR; INTRAVENOUS at 18:54

## 2017-10-09 RX ADMIN — HYDROCODONE BITARTATE AND ACETAMINOPHEN 1 TABLET: 5; 325 TABLET ORAL at 05:48

## 2017-10-09 RX ADMIN — Medication 125 MG: at 17:08

## 2017-10-09 RX ADMIN — CASTOR OIL AND BALSAM, PERU: 788; 87 OINTMENT TOPICAL at 22:04

## 2017-10-09 RX ADMIN — HYDRALAZINE HYDROCHLORIDE 75 MG: 25 TABLET ORAL at 21:18

## 2017-10-09 RX ADMIN — MULTIPLE VITAMINS W/ MINERALS TAB 1 TABLET: TAB ORAL at 09:38

## 2017-10-09 RX ADMIN — HYDROCODONE BITARTATE AND ACETAMINOPHEN 1 TABLET: 5; 325 TABLET ORAL at 21:18

## 2017-10-09 RX ADMIN — PANTOPRAZOLE SODIUM 40 MG: 40 TABLET, DELAYED RELEASE ORAL at 05:48

## 2017-10-09 RX ADMIN — Medication 250 MG: at 09:41

## 2017-10-09 RX ADMIN — HEPARIN SODIUM 5000 UNITS: 5000 INJECTION, SOLUTION INTRAVENOUS; SUBCUTANEOUS at 05:48

## 2017-10-09 RX ADMIN — SIMETHICONE CHEW TAB 80 MG 80 MG: 80 TABLET ORAL at 21:18

## 2017-10-09 RX ADMIN — HEPARIN SODIUM 5000 UNITS: 5000 INJECTION, SOLUTION INTRAVENOUS; SUBCUTANEOUS at 15:03

## 2017-10-09 RX ADMIN — Medication 125 MG: at 05:48

## 2017-10-09 RX ADMIN — HEPARIN SODIUM 5000 UNITS: 5000 INJECTION, SOLUTION INTRAVENOUS; SUBCUTANEOUS at 21:17

## 2017-10-09 RX ADMIN — CASTOR OIL AND BALSAM, PERU: 788; 87 OINTMENT TOPICAL at 09:38

## 2017-10-09 RX ADMIN — Medication 125 MG: at 00:43

## 2017-10-09 RX ADMIN — Medication 250 MG: at 17:09

## 2017-10-09 RX ADMIN — INSULIN LISPRO 2 UNITS: 100 INJECTION, SOLUTION INTRAVENOUS; SUBCUTANEOUS at 21:17

## 2017-10-09 RX ADMIN — ESCITALOPRAM OXALATE 10 MG: 10 TABLET ORAL at 09:38

## 2017-10-09 NOTE — PLAN OF CARE
Problem: Patient Care Overview (Adult)  Goal: Plan of Care Review  Outcome: Ongoing (interventions implemented as appropriate)    10/09/17 0458   Coping/Psychosocial Response Interventions   Plan Of Care Reviewed With patient   Outcome Evaluation   Outcome Summary/Follow up Plan Patient unable to tolerate physical activity, vital signs stable overnight.         Problem: Pain, Acute (Adult)  Goal: Identify Related Risk Factors and Signs and Symptoms  Outcome: Ongoing (interventions implemented as appropriate)    10/09/17 0458   Pain, Acute   Related Risk Factors (Acute Pain) patient perception;persistent pain   Signs and Symptoms (Acute Pain) verbalization of pain descriptors         Problem: Fall Risk (Adult)  Goal: Identify Related Risk Factors and Signs and Symptoms  Outcome: Ongoing (interventions implemented as appropriate)    10/09/17 0458   Fall Risk   Fall Risk: Related Risk Factors age-related changes   Fall Risk: Signs and Symptoms presence of risk factors         Problem: Pressure Ulcer Risk (Saurav Scale) (Adult,Obstetrics,Pediatric)  Goal: Identify Related Risk Factors and Signs and Symptoms  Outcome: Ongoing (interventions implemented as appropriate)    10/09/17 0458   Pressure Ulcer Risk (Saurav Scale)   Related Risk Factors (Pressure Ulcer Risk (Saurav Scale)) body weight extremes;hospitalization prolonged;mobility impaired         Problem: Skin Integrity Impairment, Risk/Actual (Adult)  Goal: Identify Related Risk Factors and Signs and Symptoms  Outcome: Ongoing (interventions implemented as appropriate)    10/09/17 0458   Skin Integrity Impairment, Risk/Actual   Skin Integrity Impairment, Risk/Actual: Related Risk Factors fluid/nutrition status;immobility;infection/disease process;tissue perfusion impaired   Signs and Symptoms (Skin Integrity Impairment) edema;erythema nonblanchable;bulla/blister/vesicle;inflammation

## 2017-10-09 NOTE — THERAPY TREATMENT NOTE
Acute Care - Physical Therapy Treatment Note  [facility]     Patient Name: Joy Bueno  : 1950  MRN: [id number]  Today's Date: 10/9/2017  Onset of Illness/Injury or Date of Surgery Date: 10/04/17  Date of Referral to PT: 10/04/17  Referring Physician: SUELLEN Guerra    Admit Date: 10/4/2017    Visit Dx:    ICD-10-CM ICD-9-CM   1. Urinary tract infection associated with indwelling urethral catheter, initial encounter T83.511A 996.64    N39.0 599.0   2. Chronic renal failure, stage 4 (severe) N18.4 585.4   3. Morbid obesity with body mass index of 50.0-59.9 in adult E66.01 278.01    Z68.43 V85.43   4. Chronic anemia D64.9 285.9   5. Diarrhea in adult patient R19.7 787.91   6. Adult failure to thrive syndrome R62.7 783.7   7. Impaired mobility and ADLs Z74.09 799.89   8. Impaired functional mobility, balance, gait, and endurance Z74.09 V49.89     Patient Active Problem List   Diagnosis   • Asthma   • Diabetes mellitus   • Hypertension   • Symptomatic anemia   • Morbid obesity   • Anxiety   • Anemia   • CKD (chronic kidney disease) Stage 3   • Gastritis   • Pleural effusion, right   • Leukocytosis   • Possible pneumonia of RML/RLL   • Hypokalemia   • Hypomagnesemia   • Clostridium difficile diarrhea   • Urinary tract infection associated with indwelling urethral catheter   • Bilateral leg pain   • Generalized weakness               Adult Rehabilitation Note       10/09/17 1600 10/09/17 1520       Rehab Assessment/Intervention    Discipline physical therapist  -EH occupational therapist  -TA     Document Type therapy note (daily note)  - therapy note (daily note)  -TA     Subjective Information agree to therapy;complains of;weakness  - agree to therapy;complains of;weakness  -TA     Patient Effort, Rehab Treatment good  -EH good  -TA     Symptoms Noted During/After Treatment fatigue  - fatigue  -TA     Precautions/Limitations fall precautions;other (see comments)   spore precuations; impaired skin  integrity  -EH fall precautions;other (see comments)   spore precautions, impaired skin integrity  -TA     Specific Treatment Considerations PAINFUL BEHIND KNEES R>L; requires you to SLOWLY LOWER LES when lowering leg rest of chair.  -EH      Recorded by [EH] Justa Guerra, PT [TA] Nate Bhardwaj, OT     Vital Signs    Pre Systolic BP Rehab 197  -  -TA     Pre Treatment Diastolic BP 86  -EH 86  -TA     Post Systolic BP Rehab 198  -  -TA     Post Treatment Diastolic BP 97  -EH 97  -TA     Pretreatment Heart Rate (beats/min) 96  -EH      Intratreatment Heart Rate (beats/min) 98  -EH      Posttreatment Heart Rate (beats/min) 99  -EH      Pre SpO2 (%) 100  -  -TA     O2 Delivery Pre Treatment supplemental O2  -EH supplemental O2  -TA     Intra SpO2 (%) 98  -EH 89  -TA     O2 Delivery Intra Treatment room air  -EH room air  -TA     Post SpO2 (%) 97  -EH 97  -TA     O2 Delivery Post Treatment supplemental O2  -EH supplemental O2  -TA     Pre Patient Position Supine  -EH Supine  -TA     Intra Patient Position Sitting  -EH Sitting  -TA     Post Patient Position Sitting  -EH Sitting  -TA     Recorded by [EH] Justa Guerra, PT [TA] Nate Bhardwaj, OT     Pain Assessment    Pain Assessment Ferrell-Hernández FACES  -EH Ferrell-Baker FACES  -TA     Ferrell-Hernández FACES Pain Rating 6  - 6   With R knee flexion  -TA     Pain Score 6  -EH 6  -TA     Post Pain Score 2  -EH 2  -TA     Pain Type Chronic pain  -EH Chronic pain  -TA     Pain Location Knee  -EH Knee  -TA     Pain Orientation Right  -EH Right  -TA     Pain Intervention(s) Repositioned;Ambulation/increased activity  -EH Repositioned;Ambulation/increased activity  -TA     Response to Interventions improved with positioning  -EH improved with positioning  -TA     Recorded by [EH] Justa Guerra, PT [TA] Nate Bhardwaj, OT     Cognitive Assessment/Intervention    Current Cognitive/Communication Assessment functional  -EH functional  -TA      Orientation Status oriented to;person;place;situation  - oriented to;person;place;situation  -TA     Follows Commands/Answers Questions 100% of the time;able to follow single-step instructions;needs cueing;needs increased time  - 100% of the time;able to follow single-step instructions;needs cueing;needs increased time;needs repetition  -TA     Personal Safety decreased awareness, need for assist;decreased awareness, need for safety;mild impairment  -EH moderate impairment  -TA     Personal Safety Interventions fall prevention program maintained;gait belt;nonskid shoes/slippers when out of bed  - fall prevention program maintained;gait belt;muscle strengthening facilitated;nonskid shoes/slippers when out of bed;supervised activity  -TA     Recorded by [EH] Justa Guerra, PT [TA] Nate Bhardwaj OT     Bed Mobility, Assessment/Treatment    Bed Mobility, Assistive Device bed rails;draw sheet  - bed rails;draw sheet  -TA     Bed Mobility, Roll Left, [place] maximum assist (25% patient effort);2 person assist required  -EH maximum assist (25% patient effort);2 person assist required;verbal cues required  -TA     Bed Mobility, Roll Right, [place] maximum assist (25% patient effort);2 person assist required  -EH maximum assist (25% patient effort);2 person assist required;verbal cues required  -TA     Bed Mobility, Scoot/Bridge, [place] dependent (less than 25% patient effort);2 person assist required  -EH dependent (less than 25% patient effort);2 person assist required  -TA     Bed Mobility, Comment lifted to chair for sitting balance.  - Lift to chair for unsupported sitting activities  -TA     Recorded by [EH] Justa Guerra, PT [TA] Nate Bhardwaj, OT     Transfer Assessment/Treatment    Transfers, Bed-Chair [place] dependent (less than 25% patient effort)  -EH dependent (less than 25% patient effort);2 person assist required  -TA     Transfers, Bed-Chair-Bed, Assist  Device mechanical lift  - mechanical lift  -TA     Transfer, Comment pt unable to tolerate attempt of sit to stand 2/2 R knee pain  -EH Unable to tolerate attempt d/t R knee pain  -TA     Recorded by [] Justa Guerra, PT [TA] Nate Bhardwaj OT     Lower Body Dressing Assessment/Training    LB Dressing Assess/Train, Clothing Type  donning:;slipper socks  -TA     LB Dressing Assess/Train, [place]  dependent (less than 25% patient effort)  -TA     Recorded by  [TA] Nate Bhardwaj OT     Grooming Assessment/Training    Grooming Assess/Train, Position  sitting;other (see comments)   unsupported sitting  -TA     Grooming Assess/Train, Indepen Level  verbal cues required;contact guard assist;set up required  -TA     Grooming Assess/Train, Impairments  strength decreased;impaired balance;decreased flexibility  -TA     Grooming Assess/Train, Comment  Pt able to sit at front of chair, with feet supported only and completed wash/dry face/neck/hands  -TA     Recorded by  [TA] Nate Bhardwaj OT     Motor Skills/Interventions    Additional Documentation  Balance Skills Training (Group)  -TA     Recorded by  [TA] Nate Bhardwaj, SANG     Balance Skills Training    Sitting-Level of Assistance --   unsupported sitting, static, then with UE tasks by OT  - Contact guard  -TA     Sitting-Balance Support Feet supported   1 min UE supported; 1 minute UE unsupported  - Feet supported  -     Sitting-Balance Activities Lateral lean;Forward lean;Reaching for objects;Trunk control activities;Reaching across midline  - Forward lean;Lateral lean;Reaching for objects;Trunk control activities   tolerated 2 trials of 1 minute each trial  -TA     Sitting # of Minutes 2   sitting rest break between two one-minute bouts.   - 2  -TA     Recorded by [] Justa Guerra, PT [TA] Nate Bhardwaj, SANG     Therapy Exercises    Bilateral Lower Extremities AROM:;10 reps;supine;ankle pumps/circles;hip ER;hip IR   -      Bilateral Upper Extremity  AROM:;10 reps;supine;elbow flexion/extension;hand pumps;shoulder extension/flexion;shoulder horizontal abd/add  -TA     Recorded by [EH] Justa Guerra, PT [TA] Nate Bhardwaj OT     Positioning and Restraints    Pre-Treatment Position in bed  -EH in bed  -TA     Post Treatment Position chair  - chair  -TA     In Chair notified nsg;reclined;call light within reach;encouraged to call for assist;waffle cushion;with other staff;on mechanical lift sling;legs elevated;with family/caregiver  - notified nsg;reclined;call light within reach;encouraged to call for assist;with other staff;waffle cushion;on mechanical lift sling;legs elevated  -TA     Recorded by [EH] Justa Guerra, PT [TA] Nate Bhardwaj OT       User Key  (r) = Recorded By, (t) = Taken By, (c) = Cosigned By    Initials Name Effective Dates     Justa Guerra, PT 06/19/15 -     TA Nate Bhardwaj OT 03/14/16 -                 IP PT Goals       10/09/17 1643 10/06/17 0933 10/05/17 1115    Bed Mobility PT LTG    Bed Mobility PT LTG, Date Established   10/05/17  -DM    Bed Mobility PT LTG, Time to Achieve   2 wks  -DM    Bed Mobility PT LTG, Activity Type   all bed mobility  -DM    Bed Mobility PT LTG, [place] Level   maximum assist (25% patient effort);2 person assist required  -DM    Bed Mobility PT LTG, Outcome goal ongoing  [facility] goal ongoing  -     Transfer Training PT LTG    Transfer Training PT LTG, Date Established   10/05/17  -DM    Transfer Training PT LTG, Time to Achieve   2 wks  -DM    Transfer Training PT LTG, Activity Type   sit to stand/stand to sit  -DM    Transfer Training PT LTG, [place] Level   maximum assist (25% patient effort);2 person assist required   stable VS  -DM    Transfer Training PT LTG, Assist Device   walker, rolling  -DM    Transfer Training PT LTG, Outcome goal ongoing  - goal ongoing  -     Static Sitting Balance PT STG    Static Sitting  Balance PT STG, Date Established   10/05/17  -DM    Static Sitting Balance PT STG, Time to Achieve   1 wk  -DM    Static Sitting Balance PT STG, [place] Level   moderate assist (50% patient effort)  -DM    Static Sitting Balance PT STG, Outcome goal ongoing  - goal ongoing  -     Dynamic Sitting Balance PT LTG    Dynamic Sitting Balance PT LTG, Date Established   10/05/17  -DM    Dynamic Sitting Balance PT LTG, Time to Achieve   2 wks  -DM    Dynamic Sitting Balance PT LTG, [place] Level   moderate assist (50% patient effort);2 person assist required  -DM    Dynamic Sitting Balance PT LTG, Outcome goal [facility] goal ongoing  -     Static Standing Balance PT LTG    Static Standing Balance PT LTG, Date Established   10/05/17  -DM    Static Standing Balance PT LTG, Time to Achieve   2 wks  -DM    Static Standing Balance PT LTG, [place] Level   maximum assist (25% patient effort);2 person assist required  -DM    Static Standing Balance PT LTG, Assist Device   assistive Device  -DM    Static Standing Balance PT LTG, Outcome goal [facility] goal ongoing  -       User Key  (r) = Recorded By, (t) = Taken By, (c) = Cosigned By    Initials Name Provider Type    UD Yazmin Good, PTA Physical Therapy Assistant     Justa Guerra, PT Physical Therapist    DM Nancy Pearce, PT Physical Therapist          Physical Therapy Education     Title: PT OT SLP Therapies (Active)     Topic: Physical Therapy (Active)     Point: Mobility training (Active)    Learning Progress Summary    Learner Readiness Method Response Comment Documented by Status   Patient Acceptance E NR   10/09/17 1643 Active    Acceptance E,D NR  UD 10/06/17 0933 Active    Acceptance E,D NR  DM 10/05/17 1113 Active               Point: Home exercise program (Active)    Learning Progress Summary    Learner Readiness Method Response Comment Documented by Status   Patient Acceptance E NR   10/09/17 1643 Active    Acceptance E,D NR    10/06/17 0933 Active    Acceptance E,D NR   10/05/17 1113 Active               Point: Body mechanics (Active)    Learning Progress Summary    Learner Readiness Method Response Comment Documented by Status   Patient Acceptance E NR   10/09/17 1643 Active    Acceptance E,D NR   10/06/17 0933 Active    Acceptance E,D NR  DM 10/05/17 1113 Active               Point: Precautions (Active)    Learning Progress Summary    Learner Readiness Method Response Comment Documented by Status   Patient Acceptance E NR   10/09/17 1643 Active    Acceptance E,D NR   10/06/17 0933 Active    Acceptance E,D NR   10/05/17 1113 Active                      User Key     Initials Effective Dates Name Provider Type Discipline     06/22/15 -  Yazmin Good, PTA Physical Therapy Assistant PT     06/19/15 -  Justa Guerra, PT Physical Therapist PT     06/19/15 -  Nancy Pearce, PT Physical Therapist PT                    PT Recommendation and Plan  Anticipated Discharge Disposition: placement for care  PT Frequency: daily  Plan of Care Review  Plan Of Care Reviewed With: patient  Progress: improving  Outcome Summary/Follow up Plan: Pt assisted to chair via lift system and performed 2 one-minute bouts of unsupported sitting. Ther ex in bed.          Outcome Measures       10/09/17 1525 10/09/17 1520       How much help from another person do you currently need...    Turning from your back to your side while in flat bed without using bedrails? 1  -EH      Moving from lying on back to sitting on the side of a flat bed without bedrails? 1  -EH      Moving to and from a bed to a chair (including a wheelchair)? 1  -EH      Standing up from a chair using your arms (e.g., wheelchair, bedside chair)? 1  -EH      Climbing 3-5 steps with a railing? 1  -EH      To walk in hospital room? 1  -EH      AM-PAC 6 Clicks Score 6  -EH      How much help from another is currently needed...    Putting on and taking off regular lower body  clothing?  1  -TA     Bathing (including washing, rinsing, and drying)  1  -TA     Toileting (which includes using toilet bed pan or urinal)  1  -TA     Putting on and taking off regular upper body clothing  2  -TA     Taking care of personal grooming (such as brushing teeth)  4  -TA     Eating meals  4  -TA     Score  13  -TA     Functional Assessment    Outcome Measure Options AM-PAC 6 Clicks Basic Mobility (PT)  - AM-PAC 6 Clicks Daily Activity (OT)  -TA       User Key  (r) = Recorded By, (t) = Taken By, (c) = Cosigned By    Initials Name Provider Type     Justa Guerra, PT Physical Therapist    TA Nate Bhardwaj, OT Occupational Therapist           Time Calculation:         PT Charges       10/09/17 1645          Time Calculation    Start Time 1525  -      PT Received On 10/09/17  -      PT Goal Re-Cert Due Date 10/15/17  -      Time Calculation- PT    Total Timed Code Minutes- PT 16 minute(s)  -        User Key  (r) = Recorded By, (t) = Taken By, (c) = Cosigned By    Initials Name Provider Type     Justa Guerra, PT Physical Therapist          Therapy Charges for Today     Code Description Service Date Service Provider Modifiers Qty    [id number] HC PT THER PROC EA 15 MIN 10/9/2017 Justa Guerra, PT GP 1          PT G-Codes  Outcome Measure Options: AM-PAC 6 Clicks Basic Mobility (PT)    Justa Guerra, PT  10/9/2017

## 2017-10-09 NOTE — PLAN OF CARE
Problem: Patient Care Overview (Adult)  Goal: Plan of Care Review  Outcome: Ongoing (interventions implemented as appropriate)  Goal: Adult Individualization and Mutuality  Outcome: Ongoing (interventions implemented as appropriate)    10/09/17 1125   Mutuality/Individual Preferences   What Anxieties, Fears or Concerns Do You Have About Your Health or Care? not being able to get better   What Questions Do You Have About Your Health or Care? medications that we reviewed at bedside   Individualization   Patient Specific Goals to be able to function at maximum capacity   Patient Specific Interventions encourage maximum activity         Problem: Pain, Acute (Adult)  Goal: Identify Related Risk Factors and Signs and Symptoms  Outcome: Ongoing (interventions implemented as appropriate)  Goal: Acceptable Pain Control/Comfort Level  Outcome: Ongoing (interventions implemented as appropriate)    Problem: Fall Risk (Adult)  Goal: Identify Related Risk Factors and Signs and Symptoms  Outcome: Ongoing (interventions implemented as appropriate)  Goal: Absence of Falls  Outcome: Ongoing (interventions implemented as appropriate)    Problem: Pressure Ulcer Risk (Saurav Scale) (Adult,Obstetrics,Pediatric)  Goal: Identify Related Risk Factors and Signs and Symptoms  Outcome: Ongoing (interventions implemented as appropriate)  Goal: Skin Integrity  Outcome: Ongoing (interventions implemented as appropriate)    Problem: Skin Integrity Impairment, Risk/Actual (Adult)  Goal: Identify Related Risk Factors and Signs and Symptoms  Outcome: Ongoing (interventions implemented as appropriate)  Goal: Skin Integrity/Wound Healing  Outcome: Ongoing (interventions implemented as appropriate)

## 2017-10-09 NOTE — PLAN OF CARE
Problem: Patient Care Overview (Adult)  Goal: Plan of Care Review  Outcome: Ongoing (interventions implemented as appropriate)    10/09/17 1618   Coping/Psychosocial Response Interventions   Plan Of Care Reviewed With patient   Patient Care Overview   Progress improving   Outcome Evaluation   Outcome Summary/Follow up Plan VSS, mech lift to chair for dynamic/static sitting activites; tolerated 1 minute x 2 trials of dynamic sitting activites, BUE therex, wash face/neck/hands with set up/CGA; progressing toward fxl goals. Continue per OT POC         Problem: Inpatient Occupational Therapy  Goal: Bed Mobility Goal LTG- OT  Outcome: Ongoing (interventions implemented as appropriate)    10/09/17 1618   Bed Mobility OT LTG   Bed Mobility OT LTG, Outcome goal partially met  (Progressing; Max A x 2, cont' for consistency)       Goal: Transfer Training Goal 1 LTG- OT  Outcome: Ongoing (interventions implemented as appropriate)    10/05/17 1151 10/09/17 1618   Transfer Training OT LTG   Transfer Training OT LTG, Date Established 10/05/17 --    Transfer Training OT LTG, Time to Achieve 2 wks --    Transfer Training OT LTG, Activity Type sit to stand/stand to sit --    Transfer Training OT LTG, [place] maximum assist (25% patient effort);2 person assist required --    Transfer Training OT LTG, Assist Device walker, rolling  (vs UE support or other AD) --    Transfer Training OT LTG, Outcome --  goal ongoing       Goal: Strength Goal STG- OT  Outcome: Ongoing (interventions implemented as appropriate)    10/05/17 1151 10/09/17 1618   Strength OT STG   Strength Goal OT STG, Date Established 10/05/17 --    Strength Goal OT STG, Time to Achieve 1 wk --    Strength Goal OT STG, Measure to Achieve Pt. complete each session AROM/T-band exer. to support ADL and mobility indep. --    Strength Goal OT STG, Outcome --  goal ongoing  (Progressing with HEP)       Goal: Dynamic Sitting Balance Goal STG- OT  Outcome: Ongoing  (interventions implemented as appropriate)    10/05/17 1151 10/09/17 1618   Dynamic Sitting Balance OT STG   Dynamic Sitting Balance OT STG, Date Established 10/05/17 --    Dynamic Sitting Balance OT STG, Time to Achieve 1 wk --    Dynamic Sitting Balance OT STG, [place] Level contact guard assist --    Dynamic Sitting Balance OT STG, Assist Device UE Support --    Dynamic Sitting Balance OT STG, Additional Goal 10 min --    Dynamic Sitting Balance OT STG, Outcome --  goal ongoing  (Progressing; CGA for 2 trials, 1 minute each)       Goal: Static Standing Balance Goal LTG- OT  Outcome: Ongoing (interventions implemented as appropriate)    10/05/17 1151 10/09/17 1618   Static Standing Balance OT LTG   Static Standing Balance OT LTG, Date Established 10/05/17 --    Static Standing Balance OT LTG, Time to Achieve 2 wks --    Static Standing Balance OT LTG, [place] Level maximum assist (25% patient effort);2 person assist required --    Static Standing Balance OT LTG, Assist Device UE Support;assistive device --    Static Standing Balance OT LTG, Additional Goal 10 sec --    Static Standing Balance OT LTG, Outcome --  goal ongoing       Goal: Grooming Goal STG- OT  Outcome: Outcome(s) achieved Date Met:  10/09/17    10/05/17 1151 10/09/17 1618   Grooming OT STG   Grooming Goal OT STG, Date Established 10/05/17 --    Grooming Goal OT STG, Time to Achieve 1 wk --    Grooming Goal OT STG, Activity Type wash face and hands, brush teeth, --    Grooming Goal OT STG, [place] Level supervision required;set up required --    Grooming Goal OT STG, Position sitting, edge of bed;sitting in chair --    Grooming Goal OT STG, Outcome --  goal met

## 2017-10-09 NOTE — PLAN OF CARE
Problem: Patient Care Overview (Adult)  Goal: Plan of Care Review  Outcome: Ongoing (interventions implemented as appropriate)    10/09/17 1643   Coping/Psychosocial Response Interventions   Plan Of Care Reviewed With patient   Patient Care Overview   Progress improving   Outcome Evaluation   Outcome Summary/Follow up Plan Pt assisted to chair via lift system and performed 2 one-minute bouts of unsupported sitting. Ther ex in bed.         Problem: Inpatient Physical Therapy  Goal: Bed Mobility Goal LTG- PT  Outcome: Ongoing (interventions implemented as appropriate)    10/05/17 1115 10/09/17 1643   Bed Mobility PT LTG   Bed Mobility PT LTG, Date Established 10/05/17 --    Bed Mobility PT LTG, Time to Achieve 2 wks --    Bed Mobility PT LTG, Activity Type all bed mobility --    Bed Mobility PT LTG, [place] Level maximum assist (25% patient effort);2 person assist required --    Bed Mobility PT LTG, Outcome --  goal ongoing       Goal: Transfer Training Goal 1 LTG- PT  Outcome: Ongoing (interventions implemented as appropriate)    10/05/17 1115 10/09/17 1643   Transfer Training PT LTG   Transfer Training PT LTG, Date Established 10/05/17 --    Transfer Training PT LTG, Time to Achieve 2 wks --    Transfer Training PT LTG, Activity Type sit to stand/stand to sit --    Transfer Training PT LTG, [place] Level maximum assist (25% patient effort);2 person assist required  (stable VS) --    Transfer Training PT LTG, Assist Device walker, rolling --    Transfer Training PT LTG, Outcome --  goal ongoing       Goal: Static Sitting Balance Goal STG- PT  Outcome: Ongoing (interventions implemented as appropriate)    10/05/17 1115 10/09/17 1643   Static Sitting Balance PT STG   Static Sitting Balance PT STG, Date Established 10/05/17 --    Static Sitting Balance PT STG, Time to Achieve 1 wk --    Static Sitting Balance PT STG, [place] Level moderate assist (50% patient effort) --    Static Sitting Balance PT STG,  Outcome --  goal ongoing       Goal: Dynamic Sitting Balance Goal LTG- PT  Outcome: Ongoing (interventions implemented as appropriate)    10/05/17 1115 10/09/17 1643   Dynamic Sitting Balance PT LTG   Dynamic Sitting Balance PT LTG, Date Established 10/05/17 --    Dynamic Sitting Balance PT LTG, Time to Achieve 2 wks --    Dynamic Sitting Balance PT LTG, [place] Level moderate assist (50% patient effort);2 person assist required --    Dynamic Sitting Balance PT LTG, Outcome --  goal ongoing       Goal: Static Standing Balance Goal LTG- PT  Outcome: Ongoing (interventions implemented as appropriate)    10/05/17 1115 10/09/17 1643   Static Standing Balance PT LTG   Static Standing Balance PT LTG, Date Established 10/05/17 --    Static Standing Balance PT LTG, Time to Achieve 2 wks --    Static Standing Balance PT LTG, [place] Level maximum assist (25% patient effort);2 person assist required --    Static Standing Balance PT LTG, Assist Device assistive Device --    Static Standing Balance PT LTG, Outcome --  goal ongoing

## 2017-10-09 NOTE — PROGRESS NOTES
Continued Stay Note  [facility]     Patient Name: Joy Bueno  MRN: [id number]  Today's Date: 10/9/2017    Admit Date: 10/4/2017          Discharge Plan       10/09/17 1458    Case Management/Social Work Plan    Plan skilled nursing facility    Patient/Family In Agreement With Plan yes    Additional Comments I spoke with Ms. Bueno at the bedside. She states that she is now agreeable to rehab. Mrs. Bueno was at [facility] previously and left against medical advice. APS is following for self neglect since she was at home alone and not able to care for herself. I faxed referrals to facilities in [place] and surrounding [facility] in search of a skilled possible long term bed.              Discharge Codes     None        Expected Discharge Date and Time     Expected Discharge Date Expected Discharge Time    Oct 12, 2017             Darryl Shahid

## 2017-10-09 NOTE — DISCHARGE PLACEMENT REQUEST
"Case management [phone]  Needs skilled bed, likely long term placement.  Pam Loyd (67 y.o. Female)     Date of Birth Social Security Number Address Home Phone MRN    1950  [id number] [phone] [id number]    Gnosticist Marital Status          None        Admission Date Admission Type Admitting Provider Attending Provider Department, Room/Bed    10/4/17 Emergency Kelli Worrell MD Reddy, Mayuri V, MD [facility] 4H, S473/1    Discharge Date Discharge Disposition Discharge Destination                      Attending Provider: Kelli JIMENES MD     Allergies:  No Known Allergies    Isolation:  Spore   Infection:  C.difficile (10/05/17)   Code Status:  Conditional    Ht:  66\" (167.6 cm)   Wt:  336 lb 6 oz (153 kg)    Admission Cmt:  None   Principal Problem:  Generalized weakness [R53.1]                 Active Insurance as of 10/4/2017     Primary Coverage     Payor Plan Insurance Group Employer/Plan Group    MEDICARE MEDICARE A & B      Payor Plan Address Payor Plan Phone Number Effective From Effective To    [address] [phone] 5/1/2016     [place] [address]       Subscriber Name Subscriber Birth Date Member ID       PAM LOYD 1950 [id number]           Secondary Coverage     Payor Plan Insurance Group Employer/Plan Group    [place]      Payor Plan Address Payor Plan Phone Number Effective From Effective To    [address] [address]  5/1/2016     [place] [address]       Subscriber Name Subscriber Birth Date Member ID       PAM LOYD 1950 [id number]                 Emergency Contacts      (Rel.) Home Phone Work Phone Mobile Phone    Tessie Dorado (Daughter) [phone] --               History & Physical      Fernandez Noe DO at 10/4/2017  5:27 PM              [facility] Medicine Services  HISTORY AND PHYSICAL    Primary " Care Physician: Trevon Delarosa MD    Subjective     Chief Complaint:  Diarrhea, weakness, fatigue    History of Present Illness:     Ms. Bueno is a 67 AA female with pmh significant for  hypertension, diabetes, osteoarthritis, asthma, renal failure, and anxiety.  She's had 3 recent hospitalizations in August.  She was admitted from 8/3-8/12/17 with renal failure and status post renal biopsy.  She was discharged to rehabilitation with continued follow-up with nephrology.  During that hospitalization, offered colonoscopy for further workup of anemia and patient declined at that time.  She was also treated with Rocephin for Escherichia coli UTI.  Patient was admitted from rehabilitation on 8/23-8/30/17 with increased weakness.  During that hospitalization, patient continued to have anemia requiring blood and iron transfusion.  Patient had EGD on 8/25/17 that showed reactive gastropathy with minimal chronic gastritis.  Carlos george had a colonoscopy on 8/28 that showed scattered diverticula and grade 1 internal hemorrhoids.  Nephrology followed during both hospitalizations and patient was discharged to [facility] for rehabilitation. Is readmitted from 9/2/2017 through 9/7/2017 with right middle lobe/right lower lobe pneumonia and right pleural effusion with acute on chronic respiratory failure and hypoxia.  She was given Levaquin therapy and was discharged back to [facility] on oral antibiotics.  Patient was released approximately 1 week ago and states during this time at home she's become progressively weak with increasing lower extremity pain and edema.    Patient states that she worked with physical therapy and occupational therapy while at skilled nursing facility however was not extremely mobile during this time.  Since returning home, she's had help with daily activities and has not left her recliner in approximately 7 days.  Patient states she is having to wear depends because she is unable to walk to  the bathroom due to weakness and pain in the lower extremities.  She states left lower extremity is significantly worse than the right.  Patient is having trouble standing due to pain and weakness.  She has not had any falls.  Denies fever, chills, sweats.  Patient states she is slightly more short of breath in usual which is not terribly noticeable at rest however does notice upon standing from recliner.  Patient denies dysuria, frequency, abdominal pain, nausea or vomiting but states she's noticed several episodes of watery diarrhea since 2017.    ED evaluation reveals troponin 0.02, , potassium 3.0, creatinine 1.5 (previous 2.2), magnesium 1.2, d-dimer 7.5, WBCs 15.7, hemoglobin 7.8, hematocrit 24.7.  Chest x-ray stable from last admission, positive UTI with hematuria.  Patient to be admitted to hospital medicine service for further management.    Review of Systems   Constitutional: Positive for activity change and fatigue. Negative for fever.   HENT: Negative.    Eyes: Negative.    Respiratory: Positive for shortness of breath. Negative for cough and wheezing.    Cardiovascular: Positive for leg swelling. Negative for chest pain and palpitations.   Gastrointestinal: Positive for diarrhea. Negative for abdominal pain, nausea and vomiting.   Endocrine: Negative.    Genitourinary: Negative for decreased urine volume, difficulty urinating and dysuria.   Musculoskeletal: Positive for gait problem and myalgias.   Skin: Negative.    Neurological: Positive for weakness.   Psychiatric/Behavioral: Negative.       Otherwise complete 10 system ROS performed and negative except as mentioned in the HPI.    Past Medical History:   Diagnosis Date   • Anemia    • Anxiety    • Asthma    • Diabetes mellitus    • Hypertension    • Morbid obesity    • Osteoarthritis        Past Surgical History:   Procedure Laterality Date   •  SECTION     • COLONOSCOPY N/A 2017    Procedure: COLONOSCOPY;  Surgeon: Velasquez HERNÁNDEZ  "Brunner, MD;  Location:  [facility];  Service:    • ENDOSCOPY N/A 8/25/2017    Procedure: ESOPHAGOGASTRODUODENOSCOPY ;  Surgeon: Velasquez Call MD;  Location:  [facility];  Service:    • HERNIA REPAIR         History reviewed. No pertinent family history.    Social History     Social History   • Marital status:      Spouse name: N/A   • Number of children: N/A   • Years of education: N/A     Occupational History   • Not on file.     Social History Main Topics   • Smoking status: Former Smoker   • Smokeless tobacco: Not on file   • Alcohol use No   • Drug use: No   • Sexual activity: No     Other Topics Concern   • Not on file     Social History Narrative    lIVES ALONE.  PATIENT STATES SHE HAS HOME HEALTH COME IN.       Medications:    (Not in a hospital admission)    Allergies:  No Known Allergies      Objective     Physical Exam:  Vital Signs: /61  Pulse 96  Temp 97.5 °F (36.4 °C) (Oral)   Resp 24  Ht 65\" (165.1 cm)  Wt (!) 350 lb (159 kg)  SpO2 96%  BMI 58.24 kg/m2  Physical Exam   Constitutional: She is oriented to person, place, and time. She appears well-developed and well-nourished. No distress.   HENT:   Head: Normocephalic and atraumatic.   Dry mucous membranes   Eyes: Pupils are equal, round, and reactive to light. No scleral icterus.   Neck: Normal range of motion. No JVD present.   Cardiovascular: Normal rate, regular rhythm, normal heart sounds and intact distal pulses.    Pulmonary/Chest: Effort normal. No respiratory distress. She has no wheezes. She has rales (bilateral bases).   Abdominal: Soft. Bowel sounds are normal. She exhibits no distension. There is no tenderness. There is no guarding.   Musculoskeletal: Normal range of motion. She exhibits edema (bilateral pitting edema 2+- asymmetric calves with left being larger) and tenderness.   Neurological: She is alert and oriented to person, place, and time.   Skin: Skin is warm and dry. No erythema.   Psychiatric: She " has a normal mood and affect. Her behavior is normal. Judgment and thought content normal.           Results Reviewed:    Results from last 7 days  Lab Units 10/04/17  1338   WBC 10*3/mm3 15.79*   HEMOGLOBIN g/dL 7.8*   PLATELETS 10*3/mm3 328       Results from last 7 days  Lab Units 10/04/17  1338   SODIUM mmol/L 138   POTASSIUM mmol/L 3.0*   CO2 mmol/L 31.0   CREATININE mg/dL 1.50*   GLUCOSE mg/dL 149*   CALCIUM mg/dL 8.7       I have personally reviewed and interpreted available lab data, radiology studies and ECG obtained at time of admission.     Assessment / Plan     Problem List:   Hospital Problem List     * (Principal)Acute cystitis with hematuria    Fatigue    Leukocytosis    Diarrhea    Bilateral leg pain    Hypokalemia    Hypomagnesemia    Asthma    Diabetes mellitus    Hypertension    Morbid obesity    Anxiety    Anemia    Overview Addendum 8/28/2017 11:03 AM by Piyush Stokes MD     Iron deficient         CKD (chronic kidney disease) Stage 3    Overview Signed 8/28/2017 11:03 AM by Piyush Stokes MD     Followed by nephrology associates (improving)         Gastritis    Overview Signed 8/28/2017 11:05 AM by Piyush Stokes MD     Biopsy negative for h.pylori         Urinary tract infection associated with indwelling urethral catheter          Assessment:    Plan:  UTI  -- continue vanc/ zosyn started in ED  -- continue mendieta for incontinence (functional) with diarrhea  -- blood and urine cx pending    Diarrhea  -- recent history of several antibiotics over last 3 months  -- cover for c diff with oral vanc qid emperically  -- stool studies pending    Weakness/ fatigue/ bilateral leg pain  -- increasing fatigue. On review, renal function improved as well as h/h that is stable  -- suspect weakness fatigue r/t acute infectious etiology  -- patient not left recliner since returning home from [facility] over 1 week ago and fairly immobile at [facility]     Check bilateral Venous duplex and VQ scan  "for ongoing soa (slightly worse than baseline)    Electrolyte derrangement  -- replete and recheck    Accelerated HTN  -- continue home meds, hold lasix in interim for rehydration    Chronic anemia- iron deficient  -- monitor, suspect dilutional drop with IVFs  -- obtain type and screen. Transfuse <7 or symptomatic    T2 DM  -- ssi qid with fsbs  -- check a1c  -- not used insulin since discharge from SNF as has no glucometer at home (lost). Consult CM for supplies    CKD  -- followed by [facility]  -- improvement since prior admissions, monitor      DVT prophylaxis:heparin sq  Code Status: conditional  Admission Status: Patient will be admitted to [facility]     Sabra Guerra, APRN 10/04/17 5:29 PM          I seen and evaluated the patient.  I've performed a independent history and physical examination.  I have reviewed the above assessment and plan which I agree with.    Fernandez Noe DO  9:31 PM  10/4/17     Electronically signed by Fernandez Noe DO at 10/4/2017  9:31 PM           Physician Progress Notes (most recent note)      Kelli JIMENES MD at 10/9/2017 12:46 PM  Version 1 of 1               HOSPITALIST DAILY PROGRESS NOTE    Chief Complaint: f/u weakness and diarrhea    Subjective   SUBJECTIVE/OVERNIGHT EVENTS   Patient seen this morning. Laying in bed, no major complaints. Says she has been to a few facilities before, states she would be agreeable to rehab at discharge but ultimately wants to go back to living alone at her home. She denies any chest pain or SOA.     Review of Systems:  Gen-no fevers, no chills  CV-no chest pain, no palpitations  Resp-no cough, no dyspnea  GI-no N/V/D, no abd pain    Otherwise complete ROS is negative except as mentioned in the HPI.    Objective   OBJECTIVE   I have reviewed the vital signs.  /86  Pulse 93  Temp 98.5 °F (36.9 °C)  Resp 20  Ht 66\" (167.6 cm)  Wt (!) 336 lb 6 oz (153 kg)  SpO2 98%  BMI 54.29 kg/m2    Physical Exam:  Gen-no acute distress, " laying in bed  CV-RRR, S1 S2 normal, no m/r/g  Resp-CTAB, no wheezes  Abd-soft, NT, ND, +BS  Ext-BLE edema, chronic venous stasis changes  Neuro-A&Ox3, no focal deficits  Psych-appropriate mood but somewhat flat    Results:  I have reviewed the labs, culture data.    Results from last 7 days  Lab Units 10/09/17  0907 10/08/17  0834 10/06/17  2341  10/06/17  0409  10/05/17  0429 10/04/17  1338   WBC 10*3/mm3  --   --   --   --  9.94  --  12.29* 15.79*   HEMOGLOBIN g/dL 8.0* 8.0* 8.2*  < > 7.8*  < > 6.9* 7.8*   HEMATOCRIT % 26.0* 25.9* 26.4*  < > 24.9*  < > 20.7* 24.7*   PLATELETS 10*3/mm3  --   --   --   --  276  --  299 328   < > = values in this interval not displayed.    Results from last 7 days  Lab Units 10/09/17  0907   SODIUM mmol/L 143   POTASSIUM mmol/L 4.3   CHLORIDE mmol/L 112*   CO2 mmol/L 26.0   BUN mg/dL 40*   CREATININE mg/dL 2.00*   GLUCOSE mg/dL 161*   CALCIUM mg/dL 8.0*     Culture Data:  Cultures:    Blood Culture   Date Value Ref Range Status   10/04/2017 No growth at 3 days  Preliminary   10/04/2017 Staphylococcus, coagulase negative (A)  Preliminary     Comment:     Suggests contamination  Susceptibility will not be done unless Doctor notifies Lab       Urine Culture   Date Value Ref Range Status   10/04/2017 40,000-50,000 CFU/mL Enterococcus faecalis (A)  Final     Stool Culture   Date Value Ref Range Status   10/05/2017   Final    No Salmonella, Shigella, Campylobacter, E. coli O157, or Vibrio isolated     I have reviewed the medications.    Assessment/Plan   ASSESSMENT/PLAN    Principal Problem:    Generalized weakness  Active Problems:    Leukocytosis    Clostridium difficile diarrhea    Urinary tract infection associated with indwelling urethral catheter    Asthma    Diabetes mellitus    Hypertension    Morbid obesity    Anxiety    Anemia    CKD (chronic kidney disease) Stage 3    Hypokalemia    Hypomagnesemia    Bilateral leg pain    67 yof, morbidly obese and chronically ill with multiple  hospitalizations here this past month, she has a hx of CKD, HTN,  T2DM,recurrent UTIs, anxiety. She presents to [facility] from home with generalized weakness/fatigue and diarrhea, currently admitted for UTI and C.diff, along with anemia.       Plan  - Urine suggestive of UTI with hematuria, has been on Rocephin, however urine culture with E. Faecalis which Rocephin does not cover. Discussed with Pharmacy, will treat with Ampicillin x 3 days, especially due to low colony count and active C.diff infection.  - Though D-dimer is elevated, V/Q scan is unremarkable, patient is not hypoxic, thus PE suspicon now low. Likely has OHS. Continue Duonebs, continue O2 PRN.  - Continue po Vanc x 14 days total for C.diff (stop date 10/18/17).  - Suspected blood loss anemia, source unknown, ?hematuria, Hb 6.9 on 10/5, now stable s/p 2 units PRBC. She is s/p EGD  and C-scope  with no bleeding stigmata, H&H remain stable. Negative FOBT on admission. Monitor.   - Continue gentle fluids. Renal function stable today. Her baseline Cr appears to be around 2.0.  - PT/OT following, recommend placement.       Dispo: TBD--CM following, APS involved with patient, has left [facility] in the past     Kelli Worrell MD  10/09/17  12:46 PM             Electronically signed by Kelli JIMENES MD at 10/9/2017 12:59 PM           Physical Therapy Notes (most recent note)      Yazmin Good PTA at 10/6/2017  9:36 AM  Version 1 of 1         Acute Care - Physical Therapy Treatment Note  [facility]     Patient Name: Joy Bueno  : 1950  MRN: [id number]  Today's Date: 10/6/2017  Onset of Illness/Injury or Date of Surgery Date: 10/04/17  Date of Referral to PT: 10/04/17  Referring Physician: SUELLEN Guerra    Admit Date: 10/4/2017    Visit Dx:    ICD-10-CM ICD-9-CM   1. Urinary tract infection associated with indwelling urethral catheter, initial encounter T83.511A 996.64    N39.0 599.0   2. Chronic renal failure, stage 4 (severe)  N18.4 585.4   3. Morbid obesity with body mass index of 50.0-59.9 in adult E66.01 278.01    Z68.43 V85.43   4. Chronic anemia D64.9 285.9   5. Diarrhea in adult patient R19.7 787.91   6. Adult failure to thrive syndrome R62.7 783.7   7. Impaired mobility and ADLs Z74.09 799.89   8. Impaired functional mobility, balance, gait, and endurance Z74.09 V49.89     Patient Active Problem List   Diagnosis   • Asthma   • Diabetes mellitus   • Hypertension   • Symptomatic anemia   • Morbid obesity   • Anxiety   • Fatigue   • Anemia   • CKD (chronic kidney disease) Stage 3   • Gastritis   • Pleural effusion, right   • Leukocytosis   • Possible pneumonia of RML/RLL   • Acute cystitis with hematuria   • Hypokalemia   • Hypomagnesemia   • Diarrhea   • Urinary tract infection associated with indwelling urethral catheter   • Bilateral leg pain               Adult Rehabilitation Note       10/06/17 0850          Rehab Assessment/Intervention    Discipline physical therapy assistant  -UD      Document Type therapy note (daily note)  -UD      Subjective Information agree to therapy;complains of;weakness;pain  -UD      Patient Effort, Rehab Treatment fair  -UD      Symptoms Noted During/After Treatment increased pain  -UD      Symptoms Noted Comment --   to touch le's  -UD      Precautions/Limitations fall precautions  -UD      Recorded by [UD] Yazmin Good PTA      Vital Signs    O2 Delivery Post Treatment room air  -UD      Pre Patient Position Supine  -UD      Intra Patient Position Supine  -UD      Post Patient Position Supine  -UD      Recorded by [UD] Yazmin Good PTA      Pain Assessment    Pain Assessment Ferrell-Hernández FACES  -UD      Ferrell-Hernández FACES Pain Rating 4  -UD      Pain Score 6  -UD      Post Pain Score 6  -UD      Pain Type Chronic pain  -UD      Pain Location Leg  -UD      Pain Orientation Right;Left  -UD      Pain Intervention(s) Repositioned  -UD      Recorded by [UD] Yazmin Good PTA      Cognitive  Assessment/Intervention    Orientation Status oriented to;person  -UD      Follows Commands/Answers Questions 75% of the time  -UD      Personal Safety Interventions fall prevention program maintained  -UD      Recorded by [UD] Yazmin Good PTA      Bed Mobility, Assessment/Treatment    Bed Mob, Supine to Sit, Kendall unable to perform  -UD      Recorded by [UD] Yazmin Good PTA      Transfer Assessment/Treatment    Transfers, Bed-Chair Kendall dependent (less than 25% patient effort)  -UD      Recorded by [UD] Yazmin Good PTA      Gait Assessment/Treatment    Gait, Kendall Level not appropriate to assess  -UD      Recorded by [UD] Yazmin Good PTA      Therapy Exercises    Bilateral Lower Extremities supine;PROM:;10 reps  -UD      Bilateral Upper Extremity AROM:;10 reps;supine  -UD      Recorded by [UD] Yazmin Good PTA      Positioning and Restraints    Pre-Treatment Position in bed  -UD      Post Treatment Position bed  -UD      In Bed notified nsg;supine;call light within reach;exit alarm on;side rails up x2  -UD      Recorded by [UD] Yazmni Good PTA        User Key  (r) = Recorded By, (t) = Taken By, (c) = Cosigned By    Initials Name Effective Dates    UD Yazmin Good PTA 06/22/15 -                 IP PT Goals       10/06/17 0933 10/05/17 1115       Bed Mobility PT LTG    Bed Mobility PT LTG, Date Established  10/05/17  -DM     Bed Mobility PT LTG, Time to Achieve  2 wks  -DM     Bed Mobility PT LTG, Activity Type  all bed mobility  -DM     Bed Mobility PT LTG, Kendall Level  maximum assist (25% patient effort);2 person assist required  -DM     Bed Mobility PT LTG, Outcome goal ongoing  -UD      Transfer Training PT LTG    Transfer Training PT LTG, Date Established  10/05/17  -DM     Transfer Training PT LTG, Time to Achieve  2 wks  -DM     Transfer Training PT LTG, Activity Type  sit to stand/stand to sit  -DM     Transfer Training PT LTG, Kendall Level  maximum assist (25%  patient effort);2 person assist required   stable VS  -DM     Transfer Training PT LTG, Assist Device  walker, rolling  -DM     Transfer Training PT LTG, Outcome goal ongoing  -UD      Static Sitting Balance PT STG    Static Sitting Balance PT STG, Date Established  10/05/17  -DM     Static Sitting Balance PT STG, Time to Achieve  1 wk  -DM     Static Sitting Balance PT STG, Harrietta Level  moderate assist (50% patient effort)  -DM     Static Sitting Balance PT STG, Outcome goal ongoing  -UD      Dynamic Sitting Balance PT LTG    Dynamic Sitting Balance PT LTG, Date Established  10/05/17  -DM     Dynamic Sitting Balance PT LTG, Time to Achieve  2 wks  -DM     Dynamic Sitting Balance PT LTG, Harrietta Level  moderate assist (50% patient effort);2 person assist required  -DM     Dynamic Sitting Balance PT LTG, Outcome goal ongoing  -UD      Static Standing Balance PT LTG    Static Standing Balance PT LTG, Date Established  10/05/17  -DM     Static Standing Balance PT LTG, Time to Achieve  2 wks  -DM     Static Standing Balance PT LTG, Harrietta Level  maximum assist (25% patient effort);2 person assist required  -DM     Static Standing Balance PT LTG, Assist Device  assistive Device  -DM     Static Standing Balance PT LTG, Outcome goal ongoing  -UD        User Key  (r) = Recorded By, (t) = Taken By, (c) = Cosigned By    Initials Name Provider Type    ADRIANE Good PTA Physical Therapy Assistant    CHRIS Pearce, PT Physical Therapist          Physical Therapy Education     Title: PT OT SLP Therapies (Active)     Topic: Physical Therapy (Active)     Point: Mobility training (Active)    Learning Progress Summary    Learner Readiness Method Response Comment Documented by Status   Patient Acceptance E,D NR  UD 10/06/17 0933 Active    Acceptance E,D NR  DM 10/05/17 1113 Active               Point: Home exercise program (Active)    Learning Progress Summary    Learner Readiness Method Response Comment  Documented by Status   Patient Acceptance E,D NR  UD 10/06/17 0933 Active    Acceptance E,D NR  DM 10/05/17 1113 Active               Point: Body mechanics (Active)    Learning Progress Summary    Learner Readiness Method Response Comment Documented by Status   Patient Acceptance E,D NR  UD 10/06/17 0933 Active    Acceptance E,D NR  DM 10/05/17 1113 Active               Point: Precautions (Active)    Learning Progress Summary    Learner Readiness Method Response Comment Documented by Status   Patient Acceptance E,D NR  UD 10/06/17 0933 Active    Acceptance E,D NR  DM 10/05/17 1113 Active                      User Key     Initials Effective Dates Name Provider Type Discipline    UD 06/22/15 -  Yazmin Good, PTA Physical Therapy Assistant PT     06/19/15 -  Nancy Pearce, PT Physical Therapist PT                    PT Recommendation and Plan  Anticipated Discharge Disposition: placement for care  PT Frequency: daily  Plan of Care Review  Plan Of Care Reviewed With: patient  Progress: no change  Outcome Summary/Follow up Plan: pt sore to touch le's.only able to tolerate minim.rom.did assist. with ue's.will need lift oob          Outcome Measures       10/06/17 0850 10/05/17 1015 10/05/17 1013    How much help from another person do you currently need...    Turning from your back to your side while in flat bed without using bedrails? 1  -UD  1  -DM    Moving from lying on back to sitting on the side of a flat bed without bedrails? 1  -UD  1  -DM    Moving to and from a bed to a chair (including a wheelchair)? 1  -UD  1  -DM    Standing up from a chair using your arms (e.g., wheelchair, bedside chair)? 1  -UD  1  -DM    Climbing 3-5 steps with a railing? 1  -UD  1  -DM    To walk in hospital room? 1  -UD  1  -DM    AM-PAC 6 Clicks Score 6  -UD  6  -DM    How much help from another is currently needed...    Putting on and taking off regular lower body clothing?  1  -MITALI     Bathing (including washing, rinsing, and  drying)  1  -MITALI     Toileting (which includes using toilet bed pan or urinal)  1  -MITALI     Putting on and taking off regular upper body clothing  2  -MITALI     Taking care of personal grooming (such as brushing teeth)  3  -MITALI     Eating meals  3  -MITALI     Score  11  -MITALI     Functional Assessment    Outcome Measure Options  AM-PAC 6 Clicks Daily Activity (OT)  -MITALI AM-PAC 6 Clicks Basic Mobility (PT)  -DM      User Key  (r) = Recorded By, (t) = Taken By, (c) = Cosigned By    Initials Name Provider Type    MITALI Holliday, OT Occupational Therapist    ADRIANE Good PTA Physical Therapy Assistant    CHRIS Pearce, PT Physical Therapist           Time Calculation:         PT Charges       10/06/17 0912          Time Calculation    PT Received On 10/06/17  -      PT Goal Re-Cert Due Date 10/15/17  -      Time Calculation- PT    Total Timed Code Minutes- PT 15 minute(s)  -        User Key  (r) = Recorded By, (t) = Taken By, (c) = Cosigned By    Initials Name Provider Type    ADRIANE Good PTA Physical Therapy Assistant          Therapy Charges for Today     Code Description Service Date Service Provider Modifiers Qty    87492110139 HC PT THER PROC EA 15 MIN 10/6/2017 Yazmin Good PTA GP 1          PT G-Codes  Outcome Measure Options: AM-PAC 6 Clicks Daily Activity (OT)    Yazmin Good PTA  10/6/2017            Electronically signed by Yazmin Good PTA at 10/6/2017  9:36 AM           Occupational Therapy Notes (most recent note)      Cristy Holliday, SANG at 10/5/2017 12:00 PM  Version 1 of 1         Acute Care - Occupational Therapy Initial Evaluation  [facility]     Patient Name: Joy Bueno  : 1950  MRN: [id number]  Today's Date: 10/5/2017  Onset of Illness/Injury or Date of Surgery Date: 10/04/17  Date of Referral to OT: 10/04/17  Referring Physician: SUELLEN Guerra    Admit Date: 10/4/2017       ICD-10-CM ICD-9-CM   1. Urinary tract infection associated with indwelling urethral catheter,  initial encounter T83.511A 996.64    N39.0 599.0   2. Chronic renal failure, stage 4 (severe) N18.4 585.4   3. Morbid obesity with body mass index of 50.0-59.9 in adult E66.01 278.01    Z68.43 V85.43   4. Chronic anemia D64.9 285.9   5. Diarrhea in adult patient R19.7 787.91   6. Adult failure to thrive syndrome R62.7 783.7   7. Impaired mobility and ADLs Z74.09 799.89     Patient Active Problem List   Diagnosis   • Asthma   • Diabetes mellitus   • Hypertension   • Symptomatic anemia   • Morbid obesity   • Anxiety   • Fatigue   • Anemia   • CKD (chronic kidney disease) Stage 3   • Gastritis   • Pleural effusion, right   • Leukocytosis   • Possible pneumonia of RML/RLL   • Acute cystitis with hematuria   • Hypokalemia   • Hypomagnesemia   • Diarrhea   • Urinary tract infection associated with indwelling urethral catheter   • Bilateral leg pain     Past Medical History:   Diagnosis Date   • Anemia    • Anxiety    • Asthma    • Diabetes mellitus    • Hypertension    • Morbid obesity    • Osteoarthritis      Past Surgical History:   Procedure Laterality Date   •  SECTION     • COLONOSCOPY N/A 2017    Procedure: COLONOSCOPY;  Surgeon: Mark I Brunner, MD;  Location:  [facility];  Service:    • ENDOSCOPY N/A 2017    Procedure: ESOPHAGOGASTRODUODENOSCOPY ;  Surgeon: Velasquez Call MD;  Location:  [facility];  Service:    • HERNIA REPAIR            OT ASSESSMENT FLOWSHEET (last 72 hours)      OT Evaluation       10/05/17 1015 10/04/17 2130 10/04/17 1700          Rehab Evaluation    Document Type evaluation  -MITALI        Subjective Information agree to therapy;complains of;fatigue;pain;weakness  -MITALI        Patient Effort, Rehab Treatment adequate  -MITALI        Symptoms Noted During/After Treatment increased pain  -MITALI        Symptoms Noted Comment increased pain if touch or moved LLE especially foot.  Pt. not allowing LE's to be moved.  -MITALI        General Information    Patient Profile Review yes  -MITALI         Onset of Illness/Injury or Date of Surgery Date 10/04/17  -MITALI        Referring Physician SUELLEN Guerra  -MITALI        General Observations Pt. partially sitting up in bed.  Pt. with 02 in nose, but on room air 100%.  Specialty bed outside room for pt. to be moved to.  IV in R breast.  -MITALI        Pertinent History Of Current Problem Pt. with 3 admits in August.  8-3 to 8-17 for renal failure and to rehab.  Pt. with anemia and at first refused any testing.  Pt. then with EGD 8/25 and showed gastropathy and colonscopy showed diverticula and grade 1 internl hemorrhoids.  Pt. readmtted 9-2 to 9-7 for PNA and pleural effusions and back to [facility].  Per pt. she left there AMA and to home in which she has been stuck in her recliner for 7 days wearing brief unable to get to bathroom due to weakness, pain in LE's and L>R SOA with activity.  Pt. found with acute cystitis and hematuia, anemia and diarrhea ruling out c-diff.    -MITALI        Precautions/Limitations fall precautions;other (see comments)   spore precuations, limited skin integrity, pain LE min mvmt  -MITALI        Prior Level of Function independent:;feeding;grooming;dependent:;dressing;bathing;home management;cooking;cleaning;driving;shopping;gait;max assist:;transfer;bed mobility   pt. limited historian so question accuracy  -MITALI        Equipment Currently Used at Home walker, rolling;glucometer;oxygen;commode   limited historian so question accuracy.  -MITALI  walker, rolling;glucometer;oxygen  -KT      Plans/Goals Discussed With patient;agreed upon  -MITALI        Risks Reviewed patient:;increased discomfort;change in vital signs;lines disloged  -MITALI        Benefits Reviewed patient:;improve function;increase independence;increase strength;increase balance;increase knowledge  -MITALI        Barriers to Rehab medically complex;previous functional deficit;cognitive status;physical barrier   pt. does not appears to make rational dicisions.  -MITALI        Living Environment    Lives  With alone  -MITALI  alone  -KT      Living Arrangements --   pt. recently home from [facility] per pt  -MITALI  residential facility  -KT      Home Accessibility --   unable to determine home setup from pt.  -MITALI  no concerns  -KT      Stair Railings at Home   none  -KT      Type of Financial/Environmental Concern   none  -KT      Transportation Available   ambulance  -KT      Living Environment Comment per pt. she could not get out of her chair and neighbor and grandtrs 18 yo and 18 yo were helping her get food, roll and change brief and wash up as could.  Did not appears safe setting for pt.  -MITALI        Clinical Impression    Date of Referral to OT 10/04/17  -MITALI        OT Diagnosis impaired ADL and mobility indep due to recent renal failure, PNA and anemia and now currently with anemia diarrhea (possible c-diff) and acute cystitis  with hematuria with weakness.   -MITALI        Patient/Family Goals Statement Pt. wants to be able to go home with friends and family helping.  -MITALI        Criteria for Skilled Therapeutic Interventions Met yes;treatment indicated  -MITALI        Rehab Potential fair, will monitor progress closely   Pt. with pain, confusion and poor decision making.  -MITALI        Therapy Frequency daily  -MITALI        Anticipated Equipment Needs At Discharge --   TBD closer to discharge if appropriate.  -MITALI        Anticipated Discharge Disposition long term acute care facility   unless significant improvement made in mobility  -MITALI        Functional Level Prior    Ambulation   3-->assistive equipment and person  -KT      Transferring   3-->assistive equipment and person  -KT      Toileting   2-->assistive person  -KT      Bathing   2-->assistive person  -KT      Dressing   2-->assistive person  -KT      Eating   0-->independent  -KT      Communication   0-->understands/communicates without difficulty  -KT      Swallowing   0-->swallows foods/liquids without difficulty  -KT      Prior Functional Level Comment    none   -KT      Vital Signs    Pre Systolic BP Rehab --   vitals stable throughout session with systolic BP in 130's  -MITALI        Pre SpO2 (%) 100  -MITALI        O2 Delivery Pre Treatment room air  -MITALI        Post SpO2 (%) 100  -MITALI        O2 Delivery Post Treatment room air  -MITALI        Pre Patient Position Supine  -MITALI        Intra Patient Position Supine  -MITALI        Post Patient Position Supine  -MITALI        Pain Assessment    Pain Assessment 0-10  -MITALI        Pain Score 4   LE's L >R, up to 10/10 if moves LE's  -MITALI        Post Pain Score 4  -MITALI        Pain Type Chronic pain  -MITALI        Pain Location Leg  -MITALI        Pain Orientation Right;Left  -MITALI        Pain Intervention(s) Ambulation/increased activity;Repositioned  -MITALI        Response to Interventions tolerated  -MITALI        Vision Assessment/Intervention    Visual Impairment Comment appears functional with pt. locating items tray and lap and attending to therapist.  Not formally assessed.  -MITALI        Cognitive Assessment/Intervention    Current Cognitive/Communication Assessment impaired   scattered thoughts jumping year to year,   -MITALI        Orientation Status oriented to;person  -MITALI        Follows Commands/Answers Questions 100% of the time;able to follow single-step instructions;needs cueing;needs increased time;needs repetition  -MITALI        Personal Safety moderate impairment;decreased insight to deficits;unaware of consequences of deficits;unaware of cognitive deficits  -MITALI        Personal Safety Interventions fall prevention program maintained;gait belt;nonskid shoes/slippers when out of bed   if up  -MITALI        Short/Long Term Memory --   at least mild to mod impairement.  -MITALI        ROM (Range of Motion)    General ROM upper extremity range of motion deficits identified  -MITALI        General ROM Detail See PT LE, R shoulder only to 90 degrees PROM and hand 75% finger flexion R.  -MITALI        MMT (Manual Muscle Testing)    General MMT Assessment upper extremity strength  deficits identified  -MITALI        General MMT Assessment Detail Pt. with approx 4 to 4+/5 strength weaker proximally.  See PT LE.  -MITALI        Muscle Tone Assessment    Muscle Tone Assessment  Bilateral Lower Extremities  -PH       Bilateral Lower Extremities Muscle Tone Assessment  mildly decreased tone  -PH       Bed Mobility, Assessment/Treatment    Bed Mobility, Comment Pt. refusing any bed mobility due to causes LE pain up to 10/10 and due to weakness.  -MITALI        Transfer Assessment/Treatment    Transfer, Comment Pt. declined due to pain and weakness. HgH only 6.9 pt to get blood.  -MITALI        ADL Assessment/Intervention    Additional Documentation Self-Feeding Assessment/Training (Group)  -MITALI        Lower Body Dressing Assessment/Training    LB Dressing Assess/Train, Comment professional judement pt. max to dep with bathing , dressing and toileting.  -MITALI        Self-Feeding Assessment/Training    Self-Feeding Assess/Train, Comment Pt. able to get cup from table and bring to mouth, but shaky and extra effort.  -MITALI        Motor Skills/Interventions    Additional Documentation Fine Motor Coordination Training (Group)  -MITALI        Therapy Exercises    Bilateral Upper Extremity AROM:;10 reps;supine;elbow flexion/extension;shoulder abduction/adduction;shoulder extension/flexion;shoulder horizontal abd/add;shoulder protraction/retraction;shoulder rolls/shrugs  -MITALI        BUE Resistance manual resistance- minimal   biceps and triceps  -MITALI        Positioning and Restraints    Pre-Treatment Position in bed  -MITALI        Post Treatment Position bed  -MITALI        In Bed supine;call light within reach;encouraged to call for assist;exit alarm on  -MITALI          User Key  (r) = Recorded By, (t) = Taken By, (c) = Cosigned By    Initials Name Effective Dates    MITLAI Cristy Holliday OT 06/22/15 -     KT Keara Sullivan RN 06/16/16 -     PH Joselin Conde RN 08/17/17 -            Occupational Therapy Education     Title: PT OT SLP  Therapies (Done)     Topic: Occupational Therapy (Done)     Point: ADL training (Done)    Description: Instruct learner(s) on proper safety adaptation and remediation techniques during self care or transfers.   Instruct in proper use of assistive devices.    Learning Progress Summary    Learner Readiness Method Response Comment Documented by Status   Patient Acceptance E,D VU,NR UE ROM exer, role OT, reason for consult, benefits of activity MITALI 10/05/17 1149 Done               Point: Home exercise program (Done)    Description: Instruct learner(s) on appropriate technique for monitoring, assisting and/or progressing therapeutic exercises/activities.    Learning Progress Summary    Learner Readiness Method Response Comment Documented by Status   Patient Acceptance E,D VU,NR UE ROM exer, role OT, reason for consult, benefits of activity MITALI 10/05/17 1149 Done               Point: Precautions (Done)    Description: Instruct learner(s) on prescribed precautions during self-care and functional transfers.    Learning Progress Summary    Learner Readiness Method Response Comment Documented by Status   Patient Acceptance E,D VU,NR UE ROM exer, role OT, reason for consult, benefits of activity MITALI 10/05/17 1149 Done               Point: Body mechanics (Done)    Description: Instruct learner(s) on proper positioning and spine alignment during self-care, functional mobility activities and/or exercises.    Learning Progress Summary    Learner Readiness Method Response Comment Documented by Status   Patient Acceptance E,D VU,NR UE ROM exer, role OT, reason for consult, benefits of activity MITALI 10/05/17 1149 Done                      User Key     Initials Effective Dates Name Provider Type Discipline     06/22/15 -  Cristy Holliday OT Occupational Therapist OT                  OT Recommendation and Plan  Anticipated Equipment Needs At Discharge:  (TBD closer to discharge if appropriate.)  Anticipated Discharge Disposition: long  term acute care facility (unless significant improvement made in mobility)  Therapy Frequency: daily  Plan of Care Review  Plan Of Care Reviewed With: patient  Progress: progress towards functional goals is fair  Outcome Summary/Follow up Plan: Pt. demonstrating evolving symptoms of weakness, pain and limited activity tolerance due to recent PNA, renal failure and anemia with current acute cystitis with hematuria, anemia and diarrhea.  Pt. appears with some confusion switiching from one year to next in one sentence.  Per pt. she left AMA from SNF question accuracy. Pt. does not appear able to care for self and if does not improve significantly cognitvely and physically soon would recommend ECF.          OT Goals       10/05/17 1151          Bed Mobility OT LTG    Bed Mobility OT LTG, Date Established 10/05/17  -MITALI      Bed Mobility OT LTG, Time to Achieve 2 wks  -MITALI      Bed Mobility OT LTG, Activity Type roll left/roll right  -MITALI      Bed Mobility OT LTG, [place] Level maximum assist (25% patient effort);2 person assist required  -MITALI      Bed Mobility OT LTG, Assist Device bed rails  -MITALI      Bed Mobility OT LTG, Outcome goal ongoing  -MITALI      Transfer Training OT LTG    Transfer Training OT LTG, Date Established 10/05/17  -MITALI      Transfer Training OT LTG, Time to Achieve 2 wks  -MITALI      Transfer Training OT LTG, Activity Type sit to stand/stand to sit  -MITALI      Transfer Training OT LTG, [place] Level maximum assist (25% patient effort);2 person assist required  -MITALI      Transfer Training OT LTG, Assist Device walker, rolling   vs UE support or other AD  -MITALI      Transfer Training OT LTG, Outcome goal ongoing  -MITALI      Strength OT STG    Strength Goal OT STG, Date Established 10/05/17  -MITALI      Strength Goal OT STG, Time to Achieve 1 wk  -MITALI      Strength Goal OT STG, Measure to Achieve Pt. complete each session AROM/T-band exer.  to support ADL and mobility indep.  -MITALI      Strength Goal OT STG, Outcome  goal ongoing  -MITALI      Dynamic Sitting Balance OT STG    Dynamic Sitting Balance OT STG, Date Established 10/05/17  -MITALI      Dynamic Sitting Balance OT STG, Time to Achieve 1 wk  -MITALI      Dynamic Sitting Balance OT STG, [place] Level contact guard assist  -MITALI      Dynamic Sitting Balance OT STG, Assist Device UE Support  -MITALI      Dynamic Sitting Balance OT STG, Additional Goal 10 min  -MITALI      Dynamic Sitting Balance OT STG, Outcome goal ongoing  -MITALI      Static Standing Balance OT LTG    Static Standing Balance OT LTG, Date Established 10/05/17  -MITALI      Static Standing Balance OT LTG, Time to Achieve 2 wks  -MITALI      Static Standing Balance OT LTG, [place] Level maximum assist (25% patient effort);2 person assist required  -MITALI      Static Standing Balance OT LTG, Assist Device UE Support;assistive device  -MITALI      Static Standing Balance OT LTG, Additional Goal 10 sec  -MITALI      Static Standing Balance OT LTG, Outcome goal ongoing  -MITALI      Grooming OT STG    Grooming Goal OT STG, Date Established 10/05/17  -MITALI      Grooming Goal OT STG, Time to Achieve 1 wk  -MITALI      Grooming Goal OT STG, Activity Type wash face and hands, brush teeth,  -MITALI      Grooming Goal OT STG, [place] Level supervision required;set up required  -MITALI      Grooming Goal OT STG, Position sitting, edge of bed;sitting in chair  -MITALI      Grooming Goal OT STG, Outcome goal ongoing  -MITALI        User Key  (r) = Recorded By, (t) = Taken By, (c) = Cosigned By    Initials Name Provider Type    MITALI Holliday, OT Occupational Therapist                Outcome Measures       10/05/17 1015          How much help from another is currently needed...    Putting on and taking off regular lower body clothing? 1  -MITALI      Bathing (including washing, rinsing, and drying) 1  -MITALI      Toileting (which includes using toilet bed pan or urinal) 1  -MITALI      Putting on and taking off regular upper body clothing 2  -MITALI      Taking care of personal grooming  (such as brushing teeth) 3  -MITALI      Eating meals 3  -MITALI      Score 11  -MITALI      Functional Assessment    Outcome Measure Options AM-PAC 6 Clicks Daily Activity (OT)  -MITALI        User Key  (r) = Recorded By, (t) = Taken By, (c) = Cosigned By    Initials Name Provider Type    MITALI Holliday OT Occupational Therapist          Time Calculation:   OT Start Time: 1015    Therapy Charges for Today     Code Description Service Date Service Provider Modifiers Qty    49741488761  OT EVAL MOD COMPLEXITY 4 10/5/2017 Cristy Holliday OT GO 1    [id number]  OT THERAPEUTIC ACT EA 15 MIN 10/5/2017 Cristy Holliday OT GO 1               Cristy Holliday OT  10/5/2017     Electronically signed by Cristy Holliday OT at 10/5/2017 12:00 PM

## 2017-10-09 NOTE — THERAPY TREATMENT NOTE
Acute Care - Occupational Therapy Treatment Note  [facility]     Patient Name: Joy Bueno  : 1950  MRN: [id number]  Today's Date: 10/9/2017  Onset of Illness/Injury or Date of Surgery Date: 10/04/17  Date of Referral to OT: 10/04/17  Referring Physician: SUELLEN Guerra      Admit Date: 10/4/2017    Visit Dx:     ICD-10-CM ICD-9-CM   1. Urinary tract infection associated with indwelling urethral catheter, initial encounter T83.511A 996.64    N39.0 599.0   2. Chronic renal failure, stage 4 (severe) N18.4 585.4   3. Morbid obesity with body mass index of 50.0-59.9 in adult E66.01 278.01    Z68.43 V85.43   4. Chronic anemia D64.9 285.9   5. Diarrhea in adult patient R19.7 787.91   6. Adult failure to thrive syndrome R62.7 783.7   7. Impaired mobility and ADLs Z74.09 799.89   8. Impaired functional mobility, balance, gait, and endurance Z74.09 V49.89     Patient Active Problem List   Diagnosis   • Asthma   • Diabetes mellitus   • Hypertension   • Symptomatic anemia   • Morbid obesity   • Anxiety   • Anemia   • CKD (chronic kidney disease) Stage 3   • Gastritis   • Pleural effusion, right   • Leukocytosis   • Possible pneumonia of RML/RLL   • Hypokalemia   • Hypomagnesemia   • Clostridium difficile diarrhea   • Urinary tract infection associated with indwelling urethral catheter   • Bilateral leg pain   • Generalized weakness             Adult Rehabilitation Note       10/09/17 1520          Rehab Assessment/Intervention    Discipline occupational therapist  -TA      Document Type therapy note (daily note)  -TA      Subjective Information agree to therapy;complains of;weakness  -TA      Patient Effort, Rehab Treatment good  -TA      Symptoms Noted During/After Treatment fatigue  -TA      Precautions/Limitations fall precautions;other (see comments)   spore precautions, impaired skin integrity  -TA      Recorded by [TA] Nate Bhardwaj OT      Vital Signs    Pre Systolic BP Rehab 197  -TA      Pre  Treatment Diastolic BP 86  -TA      Post Systolic BP Rehab 198  -TA      Post Treatment Diastolic BP 97  -TA      Pre SpO2 (%) 100  -TA      O2 Delivery Pre Treatment supplemental O2  -TA      Intra SpO2 (%) 89  -TA      O2 Delivery Intra Treatment room air  -TA      Post SpO2 (%) 97  -TA      O2 Delivery Post Treatment supplemental O2  -TA      Pre Patient Position Supine  -TA      Intra Patient Position Sitting  -TA      Post Patient Position Sitting  -TA      Recorded by [TA] Nate Bhardwaj OT      Pain Assessment    Pain Assessment Ferrell-Hernández FACES  -TA      Ferrell-Hernández FACES Pain Rating 6   With R knee flexion  -TA      Pain Score 6  -TA      Post Pain Score 2  -TA      Pain Type Chronic pain  -TA      Pain Location Knee  -TA      Pain Orientation Right  -TA      Pain Intervention(s) Repositioned;Ambulation/increased activity  -TA      Response to Interventions improved with positioning  -TA      Recorded by [TA] Nate Bhardwaj OT      Cognitive Assessment/Intervention    Current Cognitive/Communication Assessment functional  -TA      Orientation Status oriented to;person;place;situation  -TA      Follows Commands/Answers Questions 100% of the time;able to follow single-step instructions;needs cueing;needs increased time;needs repetition  -TA      Personal Safety moderate impairment  -TA      Personal Safety Interventions fall prevention program maintained;gait belt;muscle strengthening facilitated;nonskid shoes/slippers when out of bed;supervised activity  -TA      Recorded by [TA] Nate Bhardwaj OT      Bed Mobility, Assessment/Treatment    Bed Mobility, Assistive Device bed rails;draw sheet  -TA      Bed Mobility, Roll Left, [place] maximum assist (25% patient effort);2 person assist required;verbal cues required  -TA      Bed Mobility, Roll Right, [place] maximum assist (25% patient effort);2 person assist required;verbal cues required  -TA      Bed Mobility, Scoot/Bridge,  [place] dependent (less than 25% patient effort);2 person assist required  -TA      Bed Mobility, Comment Lift to chair for unsupported sitting activities  -TA      Recorded by [TA] Nate Bhardwaj OT      Transfer Assessment/Treatment    Transfers, Bed-Chair [place] dependent (less than 25% patient effort);2 person assist required  -TA      Transfers, Bed-Chair-Bed, Assist Device mechanical lift  -TA      Transfer, Comment Unable to tolerate attempt d/t R knee pain  -TA      Recorded by [TA] Nate Bhardwaj OT      Lower Body Dressing Assessment/Training    LB Dressing Assess/Train, Clothing Type donning:;slipper socks  -TA      LB Dressing Assess/Train, [place] dependent (less than 25% patient effort)  -TA      Recorded by [TA] Nate Bhardwaj OT      Grooming Assessment/Training    Grooming Assess/Train, Position sitting;other (see comments)   unsupported sitting  -TA      Grooming Assess/Train, Indepen Level verbal cues required;contact guard assist;set up required  -TA      Grooming Assess/Train, Impairments strength decreased;impaired balance;decreased flexibility  -TA      Grooming Assess/Train, Comment Pt able to sit at front of chair, with feet supported only and completed wash/dry face/neck/hands  -TA      Recorded by [TA] Nate Bhardwaj OT      Motor Skills/Interventions    Additional Documentation Balance Skills Training (Group)  -TA      Recorded by [TA] Nate Bhardwaj OT      Balance Skills Training    Sitting-Level of Assistance Contact guard  -TA      Sitting-Balance Support Feet supported  -TA      Sitting-Balance Activities Forward lean;Lateral lean;Reaching for objects;Trunk control activities   tolerated 2 trials of 1 minute each trial  -TA      Sitting # of Minutes 2  -TA      Recorded by [TA] Nate Bhardwaj OT      Therapy Exercises    Bilateral Upper Extremity AROM:;10 reps;supine;elbow flexion/extension;hand pumps;shoulder extension/flexion;shoulder  horizontal abd/add  -TA      Recorded by [TA] Nate Bhardwaj OT      Positioning and Restraints    Pre-Treatment Position in bed  -TA      Post Treatment Position chair  -TA      In Chair notified nsg;reclined;call light within reach;encouraged to call for assist;with other staff;waffle cushion;on mechanical lift sling;legs elevated  -TA      Recorded by [TA] Nate Bhardwaj OT        User Key  (r) = Recorded By, (t) = Taken By, (c) = Cosigned By    Initials Name Effective Dates    TA Nate Bhardwaj OT 03/14/16 -                 OT Goals       10/09/17 1618 10/05/17 1151       Bed Mobility OT LTG    Bed Mobility OT LTG, Date Established  10/05/17  -MITALI     Bed Mobility OT LTG, Time to Achieve  2 wks  -MITALI     Bed Mobility OT LTG, Activity Type  roll left/roll right  -MITALI     Bed Mobility OT LTG, [place] Level  maximum assist (25% patient effort);2 person assist required  -MITALI     Bed Mobility OT LTG, Assist Device  bed rails  -MITALI     Bed Mobility OT LTG, Outcome goal partially met   Progressing; Max A x 2, cont' for consistency  -TA goal ongoing  -MITALI     Transfer Training OT LTG    Transfer Training OT LTG, Date Established  10/05/17  -MITALI     Transfer Training OT LTG, Time to Achieve  2 wks  -MITALI     Transfer Training OT LTG, Activity Type  sit to stand/stand to sit  -MITALI     Transfer Training OT LTG, [place] Level  maximum assist (25% patient effort);2 person assist required  -MITALI     Transfer Training OT LTG, Assist Device  walker, rolling   vs UE support or other AD  -MITALI     Transfer Training OT LTG, Outcome goal ongoing  -TA goal ongoing  -MITALI     Strength OT STG    Strength Goal OT STG, Date Established  10/05/17  -MITALI     Strength Goal OT STG, Time to Achieve  1 wk  -MITALI     Strength Goal OT STG, Measure to Achieve  Pt. complete each session AROM/T-band exer.  to support ADL and mobility indep.  -MITALI     Strength Goal OT STG, Outcome goal ongoing   Progressing with HEP  -TA goal ongoing  -MITALI      Dynamic Sitting Balance OT STG    Dynamic Sitting Balance OT STG, Date Established  10/05/17  -MITALI     Dynamic Sitting Balance OT STG, Time to Achieve  1 wk  -MITALI     Dynamic Sitting Balance OT STG, [place] Level  contact guard assist  -MITALI     Dynamic Sitting Balance OT STG, Assist Device  UE Support  -MITALI     Dynamic Sitting Balance OT STG, Additional Goal  10 min  -MITALI     Dynamic Sitting Balance OT STG, Outcome goal ongoing   Progressing; CGA for 2 trials, 1 minute each  -TA goal ongoing  -MITALI     Static Standing Balance OT LTG    Static Standing Balance OT LTG, Date Established  10/05/17  -MITALI     Static Standing Balance OT LTG, Time to Achieve  2 wks  -MITALI     Static Standing Balance OT LTG, [place] Level  maximum assist (25% patient effort);2 person assist required  -MITALI     Static Standing Balance OT LTG, Assist Device  UE Support;assistive device  -MITALI     Static Standing Balance OT LTG, Additional Goal  10 sec  -MITALI     Static Standing Balance OT LTG, Outcome goal ongoing  -TA goal ongoing  -MITALI     Grooming OT STG    Grooming Goal OT STG, Date Established  10/05/17  -MITALI     Grooming Goal OT STG, Time to Achieve  1 wk  -MITALI     Grooming Goal OT STG, Activity Type  wash face and hands, brush teeth,  -MITALI     Grooming Goal OT STG, [place] Level  supervision required;set up required  -MITALI     Grooming Goal OT STG, Position  sitting, edge of bed;sitting in chair  -MITALI     Grooming Goal OT STG, Outcome goal met  -TA goal ongoing  -MITALI       User Key  (r) = Recorded By, (t) = Taken By, (c) = Cosigned By    Initials Name Provider Type    MITALI Holliday, OT Occupational Therapist    MOSES Bhardwaj, OT Occupational Therapist          Occupational Therapy Education     Title: PT OT SLP Therapies (Active)     Topic: Occupational Therapy (Active)     Point: ADL training (Active)    Description: Instruct learner(s) on proper safety adaptation and remediation techniques during self care or transfers.   Instruct  in proper use of assistive devices.    Learning Progress Summary    Learner Readiness Method Response Comment Documented by Status   Patient Acceptance E,D VU,NR UE ROM exer, role OT, reason for consult, benefits of activity MITALI 10/05/17 1149 Done    Acceptance E,D NR  DM 10/05/17 1113 Active               Point: Home exercise program (Done)    Description: Instruct learner(s) on appropriate technique for monitoring, assisting and/or progressing therapeutic exercises/activities.    Learning Progress Summary    Learner Readiness Method Response Comment Documented by Status   Patient Acceptance E,D VU,NR Body mechanics with bed mobility, trunk flexion for upright sitting in chair; BUE HEP; adaptive breathing. TA 10/09/17 1617 Done    Acceptance E,D VU,NR UE ROM exer, role OT, reason for consult, benefits of activity MITALI 10/05/17 1149 Done    Acceptance E,D NR  DM 10/05/17 1113 Active               Point: Precautions (Active)    Description: Instruct learner(s) on prescribed precautions during self-care and functional transfers.    Learning Progress Summary    Learner Readiness Method Response Comment Documented by Status   Patient Acceptance E,D VU,NR UE ROM exer, role OT, reason for consult, benefits of activity MITALI 10/05/17 1149 Done    Acceptance E,D NR  DM 10/05/17 1113 Active               Point: Body mechanics (Done)    Description: Instruct learner(s) on proper positioning and spine alignment during self-care, functional mobility activities and/or exercises.    Learning Progress Summary    Learner Readiness Method Response Comment Documented by Status   Patient Acceptance E,D VU,NR Body mechanics with bed mobility, trunk flexion for upright sitting in chair; BUE HEP; adaptive breathing. TA 10/09/17 1617 Done    Acceptance E,D VU,NR UE ROM exer, role OT, reason for consult, benefits of activity MITALI 10/05/17 1149 Done    Acceptance E,D NR  DM 10/05/17 1113 Active                      User Key     Initials Effective  Dates Name Provider Type Discipline    MITALI 06/22/15 -  Cristy Holliday, OT Occupational Therapist OT    DM 06/19/15 -  Nancy Pearce, PT Physical Therapist PT    MOSES 03/14/16 -  Nate Bhardwaj, OT Occupational Therapist OT                  OT Recommendation and Plan  Anticipated Equipment Needs At Discharge:  (TBD closer to discharge if appropriate.)  Anticipated Discharge Disposition: long term acute care facility (unless significant improvement made in mobility)  Therapy Frequency: daily  Plan of Care Review  Plan Of Care Reviewed With: patient  Progress: improving  Outcome Summary/Follow up Plan: VSS, mech lift to chair for dynamic/static sitting activites; tolerated 1 minute x 2 trials of dynamic sitting activites, BUE therex, wash face/neck/hands with set up/CGA; progressing toward fxl goals. Continue per OT POC        Outcome Measures       10/09/17 1520          How much help from another is currently needed...    Putting on and taking off regular lower body clothing? 1  -TA      Bathing (including washing, rinsing, and drying) 1  -TA      Toileting (which includes using toilet bed pan or urinal) 1  -TA      Putting on and taking off regular upper body clothing 2  -TA      Taking care of personal grooming (such as brushing teeth) 4  -TA      Eating meals 4  -TA      Score 13  -TA      Functional Assessment    Outcome Measure Options AM-PAC 6 Clicks Daily Activity (OT)  -TA        User Key  (r) = Recorded By, (t) = Taken By, (c) = Cosigned By    Initials Name Provider Type    TA Nate Bhardwaj OT Occupational Therapist           Time Calculation:         Time Calculation- OT       10/09/17 1622          Time Calculation- OT    OT Start Time 1520   ttc 16 minutes  -TA      Total Timed Code Minutes- OT 16 minute(s)  -TA      OT Received On 10/09/17  -TA      OT Goal Re-Cert Due Date 10/15/17  -TA        User Key  (r) = Recorded By, (t) = Taken By, (c) = Cosigned By    Initials Name Provider Type    MOSES  Nate Bhardwaj OT Occupational Therapist           Therapy Charges for Today     Code Description Service Date Service Provider Modifiers Qty    [id number]  OT THERAPEUTIC ACT EA 15 MIN 10/9/2017 Nate Bhardwaj OT GO 1               Nate Bhardwaj OT  10/9/2017

## 2017-10-09 NOTE — THERAPY TREATMENT NOTE
Acute Care - Physical Therapy Treatment Note  [facility]     Patient Name: Joy Bueno  : 1950  MRN: [id number]  Today's Date: 10/9/2017  Onset of Illness/Injury or Date of Surgery Date: 10/04/17  Date of Referral to PT: 10/04/17  Referring Physician: SUELLEN Guerra    Admit Date: 10/4/2017    Visit Dx:    ICD-10-CM ICD-9-CM   1. Urinary tract infection associated with indwelling urethral catheter, initial encounter T83.511A 996.64    N39.0 599.0   2. Chronic renal failure, stage 4 (severe) N18.4 585.4   3. Morbid obesity with body mass index of 50.0-59.9 in adult E66.01 278.01    Z68.43 V85.43   4. Chronic anemia D64.9 285.9   5. Diarrhea in adult patient R19.7 787.91   6. Adult failure to thrive syndrome R62.7 783.7   7. Impaired mobility and ADLs Z74.09 799.89   8. Impaired functional mobility, balance, gait, and endurance Z74.09 V49.89     Patient Active Problem List   Diagnosis   • Asthma   • Diabetes mellitus   • Hypertension   • Symptomatic anemia   • Morbid obesity   • Anxiety   • Anemia   • CKD (chronic kidney disease) Stage 3   • Gastritis   • Pleural effusion, right   • Leukocytosis   • Possible pneumonia of RML/RLL   • Hypokalemia   • Hypomagnesemia   • Clostridium difficile diarrhea   • Urinary tract infection associated with indwelling urethral catheter   • Bilateral leg pain   • Generalized weakness               Adult Rehabilitation Note       10/09/17 1525 10/09/17 1520       Rehab Assessment/Intervention    Discipline physical therapist  - occupational therapist  -TA     Document Type therapy note (daily note)  - therapy note (daily note)  -TA     Subjective Information agree to therapy;complains of;weakness  - agree to therapy;complains of;weakness  -TA     Patient Effort, Rehab Treatment good  -EH good  -TA     Symptoms Noted During/After Treatment fatigue  - fatigue  -TA     Precautions/Limitations fall precautions;other (see comments)   spore precuations; impaired skin  integrity  -EH fall precautions;other (see comments)   spore precautions, impaired skin integrity  -TA     Specific Treatment Considerations PAINFUL BEHIND KNEES R>L; requires you to SLOWLY LOWER LES when lowering leg rest of chair.  -EH      Recorded by [EH] Justa Guerra, PT [TA] Nate Bhardwaj, OT     Vital Signs    Pre Systolic BP Rehab 197  -  -TA     Pre Treatment Diastolic BP 86  -EH 86  -TA     Post Systolic BP Rehab 198  -  -TA     Post Treatment Diastolic BP 97  -EH 97  -TA     Pretreatment Heart Rate (beats/min) 96  -EH      Intratreatment Heart Rate (beats/min) 98  -EH      Posttreatment Heart Rate (beats/min) 99  -EH      Pre SpO2 (%) 100  -  -TA     O2 Delivery Pre Treatment supplemental O2  -EH supplemental O2  -TA     Intra SpO2 (%) 98  -EH 89  -TA     O2 Delivery Intra Treatment room air  -EH room air  -TA     Post SpO2 (%) 97  -EH 97  -TA     O2 Delivery Post Treatment supplemental O2  -EH supplemental O2  -TA     Pre Patient Position Supine  -EH Supine  -TA     Intra Patient Position Sitting  -EH Sitting  -TA     Post Patient Position Sitting  -EH Sitting  -TA     Recorded by [EH] Justa Guerra, PT [TA] Nate Bhardwaj, OT     Pain Assessment    Pain Assessment Ferrell-Hernández FACES  -EH Ferrell-Baker FACES  -TA     Ferrell-Hernández FACES Pain Rating 6  - 6   With R knee flexion  -TA     Pain Score 6  -EH 6  -TA     Post Pain Score 2  -EH 2  -TA     Pain Type Chronic pain  -EH Chronic pain  -TA     Pain Location Knee  -EH Knee  -TA     Pain Orientation Right  -EH Right  -TA     Pain Intervention(s) Repositioned;Ambulation/increased activity  -EH Repositioned;Ambulation/increased activity  -TA     Response to Interventions improved with positioning  -EH improved with positioning  -TA     Recorded by [EH] Justa Guerra, PT [TA] Nate Bhardwaj, OT     Cognitive Assessment/Intervention    Current Cognitive/Communication Assessment functional  -EH functional  -TA      Orientation Status oriented to;person;place;situation  - oriented to;person;place;situation  -TA     Follows Commands/Answers Questions 100% of the time;able to follow single-step instructions;needs cueing;needs increased time  - 100% of the time;able to follow single-step instructions;needs cueing;needs increased time;needs repetition  -TA     Personal Safety decreased awareness, need for assist;decreased awareness, need for safety;mild impairment  -EH moderate impairment  -TA     Personal Safety Interventions fall prevention program maintained;gait belt;nonskid shoes/slippers when out of bed  - fall prevention program maintained;gait belt;muscle strengthening facilitated;nonskid shoes/slippers when out of bed;supervised activity  -TA     Recorded by [EH] Justa Guerra, PT [TA] Nate Bhardwaj OT     Bed Mobility, Assessment/Treatment    Bed Mobility, Assistive Device bed rails;draw sheet  - bed rails;draw sheet  -TA     Bed Mobility, Roll Left, [place]aines maximum assist (25% patient effort);2 person assist required  -EH maximum assist (25% patient effort);2 person assist required;verbal cues required  -TA     Bed Mobility, Roll Right, Gaines maximum assist (25% patient effort);2 person assist required  -EH maximum assist (25% patient effort);2 person assist required;verbal cues required  -TA     Bed Mobility, Scoot/Bridge, Gaines dependent (less than 25% patient effort);2 person assist required  -EH dependent (less than 25% patient effort);2 person assist required  -TA     Bed Mobility, Comment lifted to chair for sitting balance.  - Lift to chair for unsupported sitting activities  -TA     Recorded by [EH] Justa Guerra, PT [TA] Nate Bhardwaj, OT     Transfer Assessment/Treatment    Transfers, Bed-Chair Gaines dependent (less than 25% patient effort)  -EH dependent (less than 25% patient effort);2 person assist required  -TA     Transfers, Bed-Chair-Bed, Assist  Device mechanical lift  - mechanical lift  -TA     Transfer, Comment pt unable to tolerate attempt of sit to stand 2/2 R knee pain  -EH Unable to tolerate attempt d/t R knee pain  -TA     Recorded by [] Justa Guerra, PT [TA] Nate Bhardwaj OT     Lower Body Dressing Assessment/Training    LB Dressing Assess/Train, Clothing Type  donning:;slipper socks  -TA     LB Dressing Assess/Train, [place]  dependent (less than 25% patient effort)  -TA     Recorded by  [TA] Nate Bhardwaj OT     Grooming Assessment/Training    Grooming Assess/Train, Position  sitting;other (see comments)   unsupported sitting  -TA     Grooming Assess/Train, Indepen Level  verbal cues required;contact guard assist;set up required  -TA     Grooming Assess/Train, Impairments  strength decreased;impaired balance;decreased flexibility  -TA     Grooming Assess/Train, Comment  Pt able to sit at front of chair, with feet supported only and completed wash/dry face/neck/hands  -TA     Recorded by  [TA] Nate Bhardwaj OT     Motor Skills/Interventions    Additional Documentation  Balance Skills Training (Group)  -TA     Recorded by  [TA] Nate Bhardwaj, SANG     Balance Skills Training    Sitting-Level of Assistance --   unsupported sitting, static, then with UE tasks by OT  - Contact guard  -TA     Sitting-Balance Support Feet supported   1 min UE supported; 1 minute UE unsupported  - Feet supported  -     Sitting-Balance Activities Lateral lean;Forward lean;Reaching for objects;Trunk control activities;Reaching across midline  - Forward lean;Lateral lean;Reaching for objects;Trunk control activities   tolerated 2 trials of 1 minute each trial  -TA     Sitting # of Minutes 2   sitting rest break between two one-minute bouts.   - 2  -TA     Recorded by [] Justa Guerra, PT [TA] Nate Bhardwaj, SANG     Therapy Exercises    Bilateral Lower Extremities AROM:;10 reps;supine;ankle pumps/circles;hip ER;hip IR   -      Bilateral Upper Extremity  AROM:;10 reps;supine;elbow flexion/extension;hand pumps;shoulder extension/flexion;shoulder horizontal abd/add  -TA     Recorded by [EH] Justa Guerra, PT [TA] Nate Bhardwaj OT     Positioning and Restraints    Pre-Treatment Position in bed  -EH in bed  -TA     Post Treatment Position chair  - chair  -TA     In Chair notified nsg;reclined;call light within reach;encouraged to call for assist;waffle cushion;with other staff;on mechanical lift sling;legs elevated;with family/caregiver  - notified nsg;reclined;call light within reach;encouraged to call for assist;with other staff;waffle cushion;on mechanical lift sling;legs elevated  -TA     Recorded by [EH] Justa Guerra, PT [TA] Nate Bhardwaj OT       User Key  (r) = Recorded By, (t) = Taken By, (c) = Cosigned By    Initials Name Effective Dates     Justa Guerra, PT 06/19/15 -     TA Nate Bhardwaj OT 03/14/16 -                 IP PT Goals       10/09/17 1643 10/06/17 0933 10/05/17 1115    Bed Mobility PT LTG    Bed Mobility PT LTG, Date Established   10/05/17  -DM    Bed Mobility PT LTG, Time to Achieve   2 wks  -DM    Bed Mobility PT LTG, Activity Type   all bed mobility  -DM    Bed Mobility PT LTG, [place] Level   maximum assist (25% patient effort);2 person assist required  -DM    Bed Mobility PT LTG, Outcome goal ongoing  [facility] goal ongoing  -     Transfer Training PT LTG    Transfer Training PT LTG, Date Established   10/05/17  -DM    Transfer Training PT LTG, Time to Achieve   2 wks  -DM    Transfer Training PT LTG, Activity Type   sit to stand/stand to sit  -DM    Transfer Training PT LTG, [place] Level   maximum assist (25% patient effort);2 person assist required   stable VS  -DM    Transfer Training PT LTG, Assist Device   walker, rolling  -DM    Transfer Training PT LTG, Outcome goal ongoing  - goal ongoing  -     Static Sitting Balance PT STG    Static Sitting  Balance PT STG, Date Established   10/05/17  -DM    Static Sitting Balance PT STG, Time to Achieve   1 wk  -DM    Static Sitting Balance PT STG, [place] Level   moderate assist (50% patient effort)  -DM    Static Sitting Balance PT STG, Outcome goal ongoing  - goal ongoing  -     Dynamic Sitting Balance PT LTG    Dynamic Sitting Balance PT LTG, Date Established   10/05/17  -DM    Dynamic Sitting Balance PT LTG, Time to Achieve   2 wks  -DM    Dynamic Sitting Balance PT LTG, [place] Level   moderate assist (50% patient effort);2 person assist required  -DM    Dynamic Sitting Balance PT LTG, Outcome goal [facility] goal ongoing  -     Static Standing Balance PT LTG    Static Standing Balance PT LTG, Date Established   10/05/17  -DM    Static Standing Balance PT LTG, Time to Achieve   2 wks  -DM    Static Standing Balance PT LTG, [place] Level   maximum assist (25% patient effort);2 person assist required  -DM    Static Standing Balance PT LTG, Assist Device   assistive Device  -DM    Static Standing Balance PT LTG, Outcome goal [facility] goal ongoing  -       User Key  (r) = Recorded By, (t) = Taken By, (c) = Cosigned By    Initials Name Provider Type    UD Yazmin Good, PTA Physical Therapy Assistant     Justa Guerra, PT Physical Therapist    DM Nancy Pearce, PT Physical Therapist          Physical Therapy Education     Title: PT OT SLP Therapies (Active)     Topic: Physical Therapy (Active)     Point: Mobility training (Active)    Learning Progress Summary    Learner Readiness Method Response Comment Documented by Status   Patient Acceptance E NR   10/09/17 1643 Active    Acceptance E,D NR  UD 10/06/17 0933 Active    Acceptance E,D NR  DM 10/05/17 1113 Active               Point: Home exercise program (Active)    Learning Progress Summary    Learner Readiness Method Response Comment Documented by Status   Patient Acceptance E NR   10/09/17 1643 Active    Acceptance E,D NR    10/06/17 0933 Active    Acceptance E,D NR   10/05/17 1113 Active               Point: Body mechanics (Active)    Learning Progress Summary    Learner Readiness Method Response Comment Documented by Status   Patient Acceptance E NR   10/09/17 1643 Active    Acceptance E,D NR   10/06/17 0933 Active    Acceptance E,D NR  DM 10/05/17 1113 Active               Point: Precautions (Active)    Learning Progress Summary    Learner Readiness Method Response Comment Documented by Status   Patient Acceptance E NR   10/09/17 1643 Active    Acceptance E,D NR   10/06/17 0933 Active    Acceptance E,D NR   10/05/17 1113 Active                      User Key     Initials Effective Dates Name Provider Type Discipline     06/22/15 -  Yazmin Good, PTA Physical Therapy Assistant PT     06/19/15 -  Justa Guerra, PT Physical Therapist PT     06/19/15 -  Nancy Pearce, PT Physical Therapist PT                    PT Recommendation and Plan  Anticipated Discharge Disposition: placement for care  PT Frequency: daily  Plan of Care Review  Plan Of Care Reviewed With: patient  Progress: improving  Outcome Summary/Follow up Plan: Pt assisted to chair via lift system and performed 2 one-minute bouts of unsupported sitting. Ther ex in bed.          Outcome Measures       10/09/17 1525 10/09/17 1520       How much help from another person do you currently need...    Turning from your back to your side while in flat bed without using bedrails? 1  -EH      Moving from lying on back to sitting on the side of a flat bed without bedrails? 1  -EH      Moving to and from a bed to a chair (including a wheelchair)? 1  -EH      Standing up from a chair using your arms (e.g., wheelchair, bedside chair)? 1  -EH      Climbing 3-5 steps with a railing? 1  -EH      To walk in hospital room? 1  -EH      AM-PAC 6 Clicks Score 6  -EH      How much help from another is currently needed...    Putting on and taking off regular lower body  clothing?  1  -TA     Bathing (including washing, rinsing, and drying)  1  -TA     Toileting (which includes using toilet bed pan or urinal)  1  -TA     Putting on and taking off regular upper body clothing  2  -TA     Taking care of personal grooming (such as brushing teeth)  4  -TA     Eating meals  4  -TA     Score  13  -TA     Functional Assessment    Outcome Measure Options AM-PAC 6 Clicks Basic Mobility (PT)  - AM-PAC 6 Clicks Daily Activity (OT)  -TA       User Key  (r) = Recorded By, (t) = Taken By, (c) = Cosigned By    Initials Name Provider Type     Justa Guerra, PT Physical Therapist    TA Nate Bhardwaj, OT Occupational Therapist           Time Calculation:         PT Charges       10/09/17 1645          Time Calculation    Start Time 1525  -      PT Received On 10/09/17  -      PT Goal Re-Cert Due Date 10/15/17  -      Time Calculation- PT    Total Timed Code Minutes- PT 16 minute(s)  -        User Key  (r) = Recorded By, (t) = Taken By, (c) = Cosigned By    Initials Name Provider Type     Justa Guerra, PT Physical Therapist          Therapy Charges for Today     Code Description Service Date Service Provider Modifiers Qty    12603901496 HC PT THER PROC EA 15 MIN 10/9/2017 Justa Guerra, PT GP 1          PT G-Codes  Outcome Measure Options: AM-PAC 6 Clicks Basic Mobility (PT)    Justa Guerra, PT  10/9/2017

## 2017-10-09 NOTE — PROGRESS NOTES
"      HOSPITALIST DAILY PROGRESS NOTE    Chief Complaint: f/u weakness and diarrhea    Subjective   SUBJECTIVE/OVERNIGHT EVENTS   Patient seen this morning. Laying in bed, no major complaints. Says she has been to a few facilities before, states she would be agreeable to rehab at discharge but ultimately wants to go back to living alone at her home. She denies any chest pain or SOA.     Review of Systems:  Gen-no fevers, no chills  CV-no chest pain, no palpitations  Resp-no cough, no dyspnea  GI-no N/V/D, no abd pain    Otherwise complete ROS is negative except as mentioned in the HPI.    Objective   OBJECTIVE   I have reviewed the vital signs.  /86  Pulse 93  Temp 98.5 °F (36.9 °C)  Resp 20  Ht 66\" (167.6 cm)  Wt (!) 336 lb 6 oz (153 kg)  SpO2 98%  BMI 54.29 kg/m2    Physical Exam:  Gen-no acute distress, laying in bed  CV-RRR, S1 S2 normal, no m/r/g  Resp-CTAB, no wheezes  Abd-soft, NT, ND, +BS  Ext-BLE edema, chronic venous stasis changes  Neuro-A&Ox3, no focal deficits  Psych-appropriate mood but somewhat flat    Results:  I have reviewed the labs, culture data.    Results from last 7 days  Lab Units 10/09/17  0907 10/08/17  0834 10/06/17  2341  10/06/17  0409  10/05/17  0429 10/04/17  1338   WBC 10*3/mm3  --   --   --   --  9.94  --  12.29* 15.79*   HEMOGLOBIN g/dL 8.0* 8.0* 8.2*  < > 7.8*  < > 6.9* 7.8*   HEMATOCRIT % 26.0* 25.9* 26.4*  < > 24.9*  < > 20.7* 24.7*   PLATELETS 10*3/mm3  --   --   --   --  276  --  299 328   < > = values in this interval not displayed.    Results from last 7 days  Lab Units 10/09/17  0907   SODIUM mmol/L 143   POTASSIUM mmol/L 4.3   CHLORIDE mmol/L 112*   CO2 mmol/L 26.0   BUN mg/dL 40*   CREATININE mg/dL 2.00*   GLUCOSE mg/dL 161*   CALCIUM mg/dL 8.0*     Culture Data:  Cultures:    Blood Culture   Date Value Ref Range Status   10/04/2017 No growth at 3 days  Preliminary   10/04/2017 Staphylococcus, coagulase negative (A)  Preliminary     Comment:     Suggests " contamination  Susceptibility will not be done unless Doctor notifies Lab       Urine Culture   Date Value Ref Range Status   10/04/2017 40,000-50,000 CFU/mL Enterococcus faecalis (A)  Final     Stool Culture   Date Value Ref Range Status   10/05/2017   Final    No Salmonella, Shigella, Campylobacter, E. coli O157, or Vibrio isolated     I have reviewed the medications.    Assessment/Plan   ASSESSMENT/PLAN    Principal Problem:    Generalized weakness  Active Problems:    Leukocytosis    Clostridium difficile diarrhea    Urinary tract infection associated with indwelling urethral catheter    Asthma    Diabetes mellitus    Hypertension    Morbid obesity    Anxiety    Anemia    CKD (chronic kidney disease) Stage 3    Hypokalemia    Hypomagnesemia    Bilateral leg pain    67 yof, morbidly obese and chronically ill with multiple hospitalizations here this past month, she has a hx of CKD, HTN,  T2DM,recurrent UTIs, anxiety. She presents to [facility] from home with generalized weakness/fatigue and diarrhea, currently admitted for UTI and C.diff, along with anemia.       Plan  - Urine suggestive of UTI with hematuria, has been on Rocephin, however urine culture with E. Faecalis which Rocephin does not cover. Discussed with Pharmacy, will treat with Ampicillin x 3 days, especially due to low colony count and active C.diff infection.  - Though D-dimer is elevated, V/Q scan is unremarkable, patient is not hypoxic, thus PE suspicon now low. Likely has OHS. Continue Duonebs, continue O2 PRN.  - Continue po Vanc x 14 days total for C.diff (stop date 10/18/17).  - Suspected blood loss anemia, source unknown, ?hematuria, Hb 6.9 on 10/5, now stable s/p 2 units PRBC. She is s/p EGD 8/25 and C-scope 8/28 with no bleeding stigmata, H&H remain stable. Negative FOBT on admission. Monitor.   - Continue gentle fluids. Renal function stable today. Her baseline Cr appears to be around 2.0.  - PT/OT following, recommend placement.       Dispo:  TBD--CM following, APS involved with patient, has left [facility] in the past     Kelli Worrell MD  10/09/17  12:46 PM

## 2017-10-10 LAB
ANION GAP SERPL CALCULATED.3IONS-SCNC: 8 MMOL/L (ref 3–11)
BACTERIA SPEC AEROBE CULT: ABNORMAL
BACTERIA SPEC AEROBE CULT: ABNORMAL
BACTERIA SPEC AEROBE CULT: NORMAL
BUN BLD-MCNC: 43 MG/DL (ref 9–23)
BUN/CREAT SERPL: 21.5 (ref 7–25)
CALCIUM SPEC-SCNC: 8.1 MG/DL (ref 8.7–10.4)
CHLORIDE SERPL-SCNC: 110 MMOL/L (ref 99–109)
CO2 SERPL-SCNC: 25 MMOL/L (ref 20–31)
CREAT BLD-MCNC: 2 MG/DL (ref 0.6–1.3)
DEPRECATED RDW RBC AUTO: 55.5 FL (ref 37–54)
ERYTHROCYTE [DISTWIDTH] IN BLOOD BY AUTOMATED COUNT: 16.5 % (ref 11.3–14.5)
GFR SERPL CREATININE-BSD FRML MDRD: 30 ML/MIN/1.73
GLUCOSE BLD-MCNC: 131 MG/DL (ref 70–100)
GLUCOSE BLDC GLUCOMTR-MCNC: 127 MG/DL (ref 70–130)
GLUCOSE BLDC GLUCOMTR-MCNC: 129 MG/DL (ref 70–130)
GLUCOSE BLDC GLUCOMTR-MCNC: 136 MG/DL (ref 70–130)
GLUCOSE BLDC GLUCOMTR-MCNC: 191 MG/DL (ref 70–130)
GRAM STN SPEC: ABNORMAL
HCT VFR BLD AUTO: 25.5 % (ref 34.5–44)
HGB BLD-MCNC: 7.7 G/DL (ref 11.5–15.5)
ISOLATED FROM: ABNORMAL
MCH RBC QN AUTO: 28 PG (ref 27–31)
MCHC RBC AUTO-ENTMCNC: 30.2 G/DL (ref 32–36)
MCV RBC AUTO: 92.7 FL (ref 80–99)
PLATELET # BLD AUTO: 343 10*3/MM3 (ref 150–450)
PMV BLD AUTO: 9.1 FL (ref 6–12)
POTASSIUM BLD-SCNC: 4.3 MMOL/L (ref 3.5–5.5)
RBC # BLD AUTO: 2.75 10*6/MM3 (ref 3.89–5.14)
SODIUM BLD-SCNC: 143 MMOL/L (ref 132–146)
WBC NRBC COR # BLD: 8.54 10*3/MM3 (ref 3.5–10.8)

## 2017-10-10 PROCEDURE — 82962 GLUCOSE BLOOD TEST: CPT

## 2017-10-10 PROCEDURE — 94799 UNLISTED PULMONARY SVC/PX: CPT

## 2017-10-10 PROCEDURE — 94640 AIRWAY INHALATION TREATMENT: CPT

## 2017-10-10 PROCEDURE — 94760 N-INVAS EAR/PLS OXIMETRY 1: CPT

## 2017-10-10 PROCEDURE — 25010000002 HEPARIN (PORCINE) PER 1000 UNITS: Performed by: NURSE PRACTITIONER

## 2017-10-10 PROCEDURE — 80048 BASIC METABOLIC PNL TOTAL CA: CPT | Performed by: HOSPITALIST

## 2017-10-10 PROCEDURE — 99232 SBSQ HOSP IP/OBS MODERATE 35: CPT | Performed by: NURSE PRACTITIONER

## 2017-10-10 PROCEDURE — 25010000002 ONDANSETRON PER 1 MG: Performed by: NURSE PRACTITIONER

## 2017-10-10 PROCEDURE — 85027 COMPLETE CBC AUTOMATED: CPT | Performed by: HOSPITALIST

## 2017-10-10 RX ADMIN — CASTOR OIL AND BALSAM, PERU: 788; 87 OINTMENT TOPICAL at 08:34

## 2017-10-10 RX ADMIN — BUDESONIDE AND FORMOTEROL FUMARATE DIHYDRATE 2 PUFF: 160; 4.5 AEROSOL RESPIRATORY (INHALATION) at 20:39

## 2017-10-10 RX ADMIN — HYDROCODONE BITARTATE AND ACETAMINOPHEN 1 TABLET: 5; 325 TABLET ORAL at 08:44

## 2017-10-10 RX ADMIN — AMPICILLIN 500 MG: 500 CAPSULE ORAL at 08:33

## 2017-10-10 RX ADMIN — AMPICILLIN 500 MG: 500 CAPSULE ORAL at 01:03

## 2017-10-10 RX ADMIN — MICONAZOLE NITRATE: 2 CREAM TOPICAL at 23:00

## 2017-10-10 RX ADMIN — MICONAZOLE NITRATE: 2 CREAM TOPICAL at 14:22

## 2017-10-10 RX ADMIN — Medication 125 MG: at 05:22

## 2017-10-10 RX ADMIN — Medication 125 MG: at 17:27

## 2017-10-10 RX ADMIN — ISOSORBIDE MONONITRATE 60 MG: 60 TABLET, EXTENDED RELEASE ORAL at 08:33

## 2017-10-10 RX ADMIN — HYDRALAZINE HYDROCHLORIDE 75 MG: 25 TABLET ORAL at 17:27

## 2017-10-10 RX ADMIN — IPRATROPIUM BROMIDE AND ALBUTEROL SULFATE 3 ML: .5; 3 SOLUTION RESPIRATORY (INHALATION) at 09:15

## 2017-10-10 RX ADMIN — IPRATROPIUM BROMIDE AND ALBUTEROL SULFATE 3 ML: .5; 3 SOLUTION RESPIRATORY (INHALATION) at 12:55

## 2017-10-10 RX ADMIN — INSULIN LISPRO 0 UNITS: 100 INJECTION, SOLUTION INTRAVENOUS; SUBCUTANEOUS at 20:52

## 2017-10-10 RX ADMIN — IPRATROPIUM BROMIDE AND ALBUTEROL SULFATE 3 ML: .5; 3 SOLUTION RESPIRATORY (INHALATION) at 20:39

## 2017-10-10 RX ADMIN — HEPARIN SODIUM 5000 UNITS: 5000 INJECTION, SOLUTION INTRAVENOUS; SUBCUTANEOUS at 20:51

## 2017-10-10 RX ADMIN — HYDROCODONE BITARTATE AND ACETAMINOPHEN 1 TABLET: 5; 325 TABLET ORAL at 14:22

## 2017-10-10 RX ADMIN — MULTIPLE VITAMINS W/ MINERALS TAB 1 TABLET: TAB ORAL at 08:33

## 2017-10-10 RX ADMIN — HYDRALAZINE HYDROCHLORIDE 75 MG: 25 TABLET ORAL at 08:33

## 2017-10-10 RX ADMIN — Medication 125 MG: at 11:03

## 2017-10-10 RX ADMIN — CASTOR OIL AND BALSAM, PERU: 788; 87 OINTMENT TOPICAL at 22:59

## 2017-10-10 RX ADMIN — HYDRALAZINE HYDROCHLORIDE 75 MG: 25 TABLET ORAL at 20:51

## 2017-10-10 RX ADMIN — ONDANSETRON 4 MG: 2 INJECTION INTRAMUSCULAR; INTRAVENOUS at 08:44

## 2017-10-10 RX ADMIN — HEPARIN SODIUM 5000 UNITS: 5000 INJECTION, SOLUTION INTRAVENOUS; SUBCUTANEOUS at 05:21

## 2017-10-10 RX ADMIN — HYDROCODONE BITARTATE AND ACETAMINOPHEN 1 TABLET: 5; 325 TABLET ORAL at 04:24

## 2017-10-10 RX ADMIN — Medication 250 MG: at 17:27

## 2017-10-10 RX ADMIN — Medication 250 MG: at 08:33

## 2017-10-10 RX ADMIN — HEPARIN SODIUM 5000 UNITS: 5000 INJECTION, SOLUTION INTRAVENOUS; SUBCUTANEOUS at 14:22

## 2017-10-10 RX ADMIN — BUDESONIDE AND FORMOTEROL FUMARATE DIHYDRATE 2 PUFF: 160; 4.5 AEROSOL RESPIRATORY (INHALATION) at 09:15

## 2017-10-10 RX ADMIN — ESCITALOPRAM OXALATE 10 MG: 10 TABLET ORAL at 08:33

## 2017-10-10 RX ADMIN — AMLODIPINE BESYLATE 5 MG: 5 TABLET ORAL at 08:33

## 2017-10-10 RX ADMIN — Medication 125 MG: at 23:58

## 2017-10-10 RX ADMIN — IPRATROPIUM BROMIDE AND ALBUTEROL SULFATE 3 ML: .5; 3 SOLUTION RESPIRATORY (INHALATION) at 16:36

## 2017-10-10 RX ADMIN — SIMETHICONE CHEW TAB 80 MG 80 MG: 80 TABLET ORAL at 20:51

## 2017-10-10 RX ADMIN — AMPICILLIN 500 MG: 500 CAPSULE ORAL at 21:21

## 2017-10-10 RX ADMIN — Medication 125 MG: at 00:30

## 2017-10-10 RX ADMIN — HYDROCODONE BITARTATE AND ACETAMINOPHEN 1 TABLET: 5; 325 TABLET ORAL at 20:52

## 2017-10-10 RX ADMIN — CLONAZEPAM 0.25 MG: 0.5 TABLET ORAL at 20:51

## 2017-10-10 RX ADMIN — ATORVASTATIN CALCIUM 10 MG: 10 TABLET, FILM COATED ORAL at 20:51

## 2017-10-10 RX ADMIN — PANTOPRAZOLE SODIUM 40 MG: 40 TABLET, DELAYED RELEASE ORAL at 05:21

## 2017-10-10 NOTE — PROGRESS NOTES
Continued Stay Note  [facility]     Patient Name: Joy Bueno  MRN: [id number]  Today's Date: 10/10/2017    Admit Date: 10/4/2017          Discharge Plan       10/10/17 1439    Case Management/Social Work Plan    Plan [place]    Patient/Family In Agreement With Plan yes    Additional Comments Per [facility], they can offer Ms. Bueno a skilled bed with a long term option if needed. I discussed this at the bedside with [facility] and Mrs. Bueno. Mrs. Bueno is agreeable to this transfer, however she is very honest in stating that if she doesn't feel good she is not working with therapy. [facility] explained that she will have to work with therapy if she wants her insurance to pay for it. Mrs. Bueno states that she is agreeable if she is medically ready. An ambulance has been scheduled to transpor 10-11 @ 1200 if medically ready. Nurse to call report to [phone].              Discharge Codes       10/10/17 1438    Discharge Codes    Discharge Codes 83  R- To skilled nursing facility        Expected Discharge Date and Time     Expected Discharge Date Expected Discharge Time    Oct 18, 2017             Darryl Shahid

## 2017-10-10 NOTE — PLAN OF CARE
Problem: Patient Care Overview (Adult)  Goal: Plan of Care Review  Outcome: Ongoing (interventions implemented as appropriate)    10/10/17 5403   Coping/Psychosocial Response Interventions   Plan Of Care Reviewed With patient   Patient Care Overview   Progress no change   Outcome Evaluation   Outcome Summary/Follow up Plan Patient still doing very minimal activity. Pretty much bedfast and turn q 2 hours. Having pain in legs, despite PRNS. Blood pressure high, on 2l NC. Awaiting placement , may go tomorrow.          Problem: Pain, Acute (Adult)  Goal: Acceptable Pain Control/Comfort Level  Outcome: Ongoing (interventions implemented as appropriate)    Problem: Fall Risk (Adult)  Goal: Absence of Falls  Outcome: Ongoing (interventions implemented as appropriate)    Problem: Pressure Ulcer Risk (Saurav Scale) (Adult,Obstetrics,Pediatric)  Goal: Skin Integrity  Outcome: Ongoing (interventions implemented as appropriate)    Problem: Infection, Risk/Actual (Adult)  Goal: Infection Prevention/Resolution  Outcome: Ongoing (interventions implemented as appropriate)

## 2017-10-10 NOTE — PLAN OF CARE
Problem: Pain, Acute (Adult)  Goal: Identify Related Risk Factors and Signs and Symptoms  Outcome: Outcome(s) achieved Date Met:  10/10/17    Problem: Fall Risk (Adult)  Goal: Identify Related Risk Factors and Signs and Symptoms  Outcome: Outcome(s) achieved Date Met:  10/10/17    Problem: Infection, Risk/Actual (Adult)  Goal: Identify Related Risk Factors and Signs and Symptoms  Outcome: Outcome(s) achieved Date Met:  10/10/17

## 2017-10-10 NOTE — PLAN OF CARE
Problem: Patient Care Overview (Adult)  Goal: Plan of Care Review  Outcome: Ongoing (interventions implemented as appropriate)    10/10/17 1025   Coping/Psychosocial Response Interventions   Plan Of Care Reviewed With patient   Patient Care Overview   Progress improving   Outcome Evaluation   Outcome Summary/Follow up Plan Pt seen for friction, shear and yeast on perineal, groin, and coccyx/ gluteal area and stage two pressure injury. Venelex and micotin cream ordered for yeast and friction/shear. Pt is on a bariatric dolphin mattress and RN Jammie aware of POC. Encouraged to use a sling/ life under patien to assist with friction/ shear.

## 2017-10-10 NOTE — PROGRESS NOTES
"      HOSPITALIST DAILY PROGRESS NOTE    Chief Complaint: f/u weakness and diarrhea    Subjective   SUBJECTIVE/OVERNIGHT EVENTS     HPI: Patient resting in bed this morning.  States that she isn't feeling well today but is vague about her complaints.  She really wants to be able to return home on her own eventually.  She is upset with her brother because he doesn't think she is going to be able to go back to her apartment.      Review of Systems:  Gen-no fevers, no chills  CV-no chest pain, no palpitations  Resp-no cough, no dyspnea  GI-no N/V/D, no abd pain  Psych- mildly depressed.    Otherwise complete ROS is negative except as mentioned in the HPI.    Objective   OBJECTIVE   I have reviewed the vital signs.  /82  Pulse 91  Temp 97.9 °F (36.6 °C)  Resp 18  Ht 66\" (167.6 cm)  Wt (!) 336 lb 6 oz (153 kg)  SpO2 97%  BMI 54.29 kg/m2    Physical Exam:    General: Resting in bed, NAD, no family at bedside.   HEENT: Normocephalic, mucous membranes moist, pupils equal  Neck: Supple, trachea midline  Cardiovascular: Regular rate and rhythm, S1-S2, no murmur  Respiratory: Clear to auscultation bilaterally, even unlabored breathing on room air  Abdomen: Soft, nontender, nondistended, bowel sounds +  Skin: Clean, dry, intact, no lesions or sores  Extremities: chronic venous stasis changes with BLE edema  Neuro: Alert, oriented ×4, appropriate and cooperative      Results:  I have reviewed the labs, culture data.    Results from last 7 days  Lab Units 10/10/17  0503 10/09/17  0907 10/08/17  0834  10/06/17  0409  10/05/17  0429   WBC 10*3/mm3 8.54  --   --   --  9.94  --  12.29*   HEMOGLOBIN g/dL 7.7* 8.0* 8.0*  < > 7.8*  < > 6.9*   HEMATOCRIT % 25.5* 26.0* 25.9*  < > 24.9*  < > 20.7*   PLATELETS 10*3/mm3 343  --   --   --  276  --  299   < > = values in this interval not displayed.    Results from last 7 days  Lab Units 10/10/17  0503   SODIUM mmol/L 143   POTASSIUM mmol/L 4.3   CHLORIDE mmol/L 110*   CO2 mmol/L " 25.0   BUN mg/dL 43*   CREATININE mg/dL 2.00*   GLUCOSE mg/dL 131*   CALCIUM mg/dL 8.1*     Culture Data:  Cultures:    Blood Culture   Date Value Ref Range Status   10/04/2017 No growth at 3 days  Preliminary   10/04/2017 Staphylococcus, coagulase negative (A)  Preliminary     Comment:     Suggests contamination  Susceptibility will not be done unless Doctor notifies Lab       Urine Culture   Date Value Ref Range Status   10/04/2017 40,000-50,000 CFU/mL Enterococcus faecalis (A)  Final     Stool Culture   Date Value Ref Range Status   10/05/2017   Final    No Salmonella, Shigella, Campylobacter, E. coli O157, or Vibrio isolated     I have reviewed the medications.    Assessment/Plan   ASSESSMENT/PLAN    Principal Problem:    Generalized weakness  Active Problems:    Hypertension    Asthma    Diabetes mellitus    Morbid obesity    Anxiety    Anemia    CKD (chronic kidney disease) Stage 3    Leukocytosis    Hypokalemia    Hypomagnesemia    Clostridium difficile diarrhea    Urinary tract infection associated with indwelling urethral catheter    Bilateral leg pain    67 yof, morbidly obese and chronically ill with multiple hospitalizations here this past month, she has a hx of CKD, HTN,  T2DM,recurrent UTIs, anxiety. She presents to [facility] from home with generalized weakness/fatigue and diarrhea, currently admitted for UTI and C.diff, along with anemia.       Plan  - UTI- culture positive for E. Faecalis.  Rocephin switched to oral ampicillin x 3 days.    - Though D-dimer is elevated, V/Q scan is unremarkable, patient is not hypoxic, thus PE suspicon now low. Continue Duonebs, continue O2 PRN.  - Continue po Vanc x 14 days total for C.diff (stop date 10/18/17).  - Suspected blood loss anemia, source unknown, ?hematuria, Hb 6.9 on 10/5, now stable s/p 2 units PRBC. She is s/p EGD 8/25 and C-scope 8/28 with no bleeding stigmata, H&H remain stable. Negative FOBT on admission. Monitor.   - Will discontinue IVF today. Renal  function stable today. Her baseline Cr appears to be around 2.0.  - PT/OT following, recommend placement.       Dispo: TBD--CM following, APS involved with patient, has left [facility] in the past. She is willing to go to rehab at discharge but her ultimate goal is to live independently again.       Mary Fischer, SUELLEN  10/10/17  12:11 PM

## 2017-10-10 NOTE — PLAN OF CARE
Problem: Patient Care Overview (Adult)  Goal: Plan of Care Review  Outcome: Ongoing (interventions implemented as appropriate)    10/09/17 1643 10/09/17 2130 10/10/17 0229   Coping/Psychosocial Response Interventions   Plan Of Care Reviewed With --  patient --    Patient Care Overview   Progress improving --  --    Outcome Evaluation   Outcome Summary/Follow up Plan --  --  Patient in bed at rest. Pain management effective. Good I&O. Continues contact percautions. Assist with repositioined every 2 hours and as needed. No falls noted.          Problem: Pain, Acute (Adult)  Goal: Acceptable Pain Control/Comfort Level  Outcome: Ongoing (interventions implemented as appropriate)    Problem: Fall Risk (Adult)  Goal: Absence of Falls  Outcome: Ongoing (interventions implemented as appropriate)    Problem: Pressure Ulcer Risk (Saurav Scale) (Adult,Obstetrics,Pediatric)  Goal: Skin Integrity  Outcome: Ongoing (interventions implemented as appropriate)    Problem: Skin Integrity Impairment, Risk/Actual (Adult)  Goal: Identify Related Risk Factors and Signs and Symptoms  Outcome: Ongoing (interventions implemented as appropriate)

## 2017-10-11 LAB
GLUCOSE BLDC GLUCOMTR-MCNC: 126 MG/DL (ref 70–130)
GLUCOSE BLDC GLUCOMTR-MCNC: 127 MG/DL (ref 70–130)
GLUCOSE BLDC GLUCOMTR-MCNC: 137 MG/DL (ref 70–130)

## 2017-10-11 PROCEDURE — 94640 AIRWAY INHALATION TREATMENT: CPT

## 2017-10-11 PROCEDURE — 25010000002 HEPARIN (PORCINE) PER 1000 UNITS: Performed by: NURSE PRACTITIONER

## 2017-10-11 PROCEDURE — 90674 CCIIV4 VAC NO PRSV 0.5 ML IM: CPT | Performed by: HOSPITALIST

## 2017-10-11 PROCEDURE — G0008 ADMIN INFLUENZA VIRUS VAC: HCPCS | Performed by: HOSPITALIST

## 2017-10-11 PROCEDURE — 94799 UNLISTED PULMONARY SVC/PX: CPT

## 2017-10-11 PROCEDURE — 94760 N-INVAS EAR/PLS OXIMETRY 1: CPT

## 2017-10-11 PROCEDURE — 99239 HOSP IP/OBS DSCHRG MGMT >30: CPT | Performed by: NURSE PRACTITIONER

## 2017-10-11 PROCEDURE — 25010000002 INFLUENZA VAC SPLIT QUAD 0.5 ML SUSPENSION PREFILLED SYRINGE: Performed by: HOSPITALIST

## 2017-10-11 PROCEDURE — 82962 GLUCOSE BLOOD TEST: CPT

## 2017-10-11 PROCEDURE — 97110 THERAPEUTIC EXERCISES: CPT

## 2017-10-11 RX ORDER — IPRATROPIUM BROMIDE AND ALBUTEROL SULFATE 2.5; .5 MG/3ML; MG/3ML
3 SOLUTION RESPIRATORY (INHALATION)
Qty: 360 ML | Refills: 0 | Status: CANCELLED
Start: 2017-10-11

## 2017-10-11 RX ORDER — CLONAZEPAM 0.5 MG/1
0.25 TABLET ORAL 2 TIMES DAILY PRN
Qty: 6 TABLET | Refills: 0 | Status: CANCELLED | OUTPATIENT
Start: 2017-10-11

## 2017-10-11 RX ORDER — CASTOR OIL AND BALSAM, PERU 788; 87 MG/G; MG/G
1 OINTMENT TOPICAL EVERY 12 HOURS SCHEDULED
Qty: 5 G | Refills: 0 | Status: CANCELLED
Start: 2017-10-11

## 2017-10-11 RX ORDER — AMPICILLIN 500 MG/1
500 CAPSULE ORAL EVERY 12 HOURS SCHEDULED
Qty: 2 CAPSULE | Refills: 0 | Status: CANCELLED
Start: 2017-10-11 | End: 2017-10-12

## 2017-10-11 RX ORDER — HYDROCODONE BITARTRATE AND ACETAMINOPHEN 5; 325 MG/1; MG/1
1 TABLET ORAL EVERY 4 HOURS PRN
Qty: 18 TABLET | Refills: 0 | Status: CANCELLED | OUTPATIENT
Start: 2017-10-11

## 2017-10-11 RX ADMIN — Medication 125 MG: at 17:34

## 2017-10-11 RX ADMIN — AMLODIPINE BESYLATE 5 MG: 5 TABLET ORAL at 09:42

## 2017-10-11 RX ADMIN — HEPARIN SODIUM 5000 UNITS: 5000 INJECTION, SOLUTION INTRAVENOUS; SUBCUTANEOUS at 17:00

## 2017-10-11 RX ADMIN — ESCITALOPRAM OXALATE 10 MG: 10 TABLET ORAL at 09:44

## 2017-10-11 RX ADMIN — PANTOPRAZOLE SODIUM 40 MG: 40 TABLET, DELAYED RELEASE ORAL at 05:42

## 2017-10-11 RX ADMIN — HYDRALAZINE HYDROCHLORIDE 75 MG: 25 TABLET ORAL at 22:25

## 2017-10-11 RX ADMIN — BUDESONIDE AND FORMOTEROL FUMARATE DIHYDRATE 2 PUFF: 160; 4.5 AEROSOL RESPIRATORY (INHALATION) at 20:55

## 2017-10-11 RX ADMIN — HYDRALAZINE HYDROCHLORIDE 75 MG: 25 TABLET ORAL at 09:44

## 2017-10-11 RX ADMIN — Medication 125 MG: at 05:43

## 2017-10-11 RX ADMIN — AMPICILLIN 500 MG: 500 CAPSULE ORAL at 09:57

## 2017-10-11 RX ADMIN — INSULIN LISPRO 2 UNITS: 100 INJECTION, SOLUTION INTRAVENOUS; SUBCUTANEOUS at 09:42

## 2017-10-11 RX ADMIN — CASTOR OIL AND BALSAM, PERU: 788; 87 OINTMENT TOPICAL at 09:46

## 2017-10-11 RX ADMIN — IPRATROPIUM BROMIDE AND ALBUTEROL SULFATE 3 ML: .5; 3 SOLUTION RESPIRATORY (INHALATION) at 20:52

## 2017-10-11 RX ADMIN — BUDESONIDE AND FORMOTEROL FUMARATE DIHYDRATE 2 PUFF: 160; 4.5 AEROSOL RESPIRATORY (INHALATION) at 07:43

## 2017-10-11 RX ADMIN — Medication 250 MG: at 17:30

## 2017-10-11 RX ADMIN — IPRATROPIUM BROMIDE AND ALBUTEROL SULFATE 3 ML: .5; 3 SOLUTION RESPIRATORY (INHALATION) at 12:45

## 2017-10-11 RX ADMIN — ISOSORBIDE MONONITRATE 60 MG: 60 TABLET, EXTENDED RELEASE ORAL at 09:43

## 2017-10-11 RX ADMIN — HEPARIN SODIUM 5000 UNITS: 5000 INJECTION, SOLUTION INTRAVENOUS; SUBCUTANEOUS at 22:27

## 2017-10-11 RX ADMIN — IPRATROPIUM BROMIDE AND ALBUTEROL SULFATE 3 ML: .5; 3 SOLUTION RESPIRATORY (INHALATION) at 07:42

## 2017-10-11 RX ADMIN — HYDRALAZINE HYDROCHLORIDE 75 MG: 25 TABLET ORAL at 17:30

## 2017-10-11 RX ADMIN — Medication 125 MG: at 12:32

## 2017-10-11 RX ADMIN — ATORVASTATIN CALCIUM 10 MG: 10 TABLET, FILM COATED ORAL at 22:26

## 2017-10-11 RX ADMIN — Medication 250 MG: at 09:43

## 2017-10-11 RX ADMIN — INFLUENZA VIRUS VACCINE 0.5 ML: 15; 15; 15; 15 SUSPENSION INTRAMUSCULAR at 17:51

## 2017-10-11 RX ADMIN — MICONAZOLE NITRATE: 2 CREAM TOPICAL at 09:45

## 2017-10-11 RX ADMIN — SIMETHICONE CHEW TAB 80 MG 80 MG: 80 TABLET ORAL at 12:31

## 2017-10-11 RX ADMIN — HYDROCODONE BITARTATE AND ACETAMINOPHEN 1 TABLET: 5; 325 TABLET ORAL at 04:43

## 2017-10-11 RX ADMIN — CLONAZEPAM 0.25 MG: 0.5 TABLET ORAL at 22:25

## 2017-10-11 RX ADMIN — MULTIPLE VITAMINS W/ MINERALS TAB 1 TABLET: TAB ORAL at 09:44

## 2017-10-11 RX ADMIN — CLONAZEPAM 0.25 MG: 0.5 TABLET ORAL at 09:57

## 2017-10-11 RX ADMIN — HEPARIN SODIUM 5000 UNITS: 5000 INJECTION, SOLUTION INTRAVENOUS; SUBCUTANEOUS at 05:42

## 2017-10-11 NOTE — DISCHARGE PLACEMENT REQUEST
"Joy Loyd (67 y.o. Female)     Date of Birth Social Security Number Address Home Phone MRN    1950  [address] [id number] [phone] [id number]    [employer] Marital Status          None        Admission Date Admission Type Admitting Provider Attending Provider Department, Room/Bed    10/4/17 Emergency Kelli Worrell MD Reddy, Mayuri V, MD [address], [address]    Discharge Date Discharge Disposition Discharge Destination                      Attending Provider: Kelli JIMENES MD     Allergies:  No Known Allergies    Isolation:  Spore   Infection:  C.difficile (10/05/17)   Code Status:  Conditional    Ht:  66\" (167.6 cm)   Wt:  336 lb 6 oz (153 kg)    Admission Cmt:  None   Principal Problem:  Generalized weakness [R53.1]                 Active Insurance as of 10/4/2017     Primary Coverage     Payor Plan Insurance Group Employer/Plan Group    MEDICARE MEDICARE A & B      Payor Plan Address Payor Plan Phone Number Effective From Effective To    [address] [phone] 5/1/2016     [place] [address]       Subscriber Name Subscriber Birth Date Member ID       JOY LOYD 1950 [id number]           Secondary Coverage     Payor Plan Insurance Group Employer/Plan Group    [place]      Payor Plan Address Payor Plan Phone Number Effective From Effective To    [address] [address]  5/1/2016     [place] [address]       Subscriber Name Subscriber Birth Date Member ID       JOY LOYD 1950 [id number]                 Emergency Contacts      (Rel.) Home Phone Work Phone Mobile Phone    Tessie Dorado (Daughter) [phone] --            Hospital Medications (active)       Dose Frequency Start End    acetaminophen (TYLENOL) tablet 650 mg 650 mg Every 4 Hours PRN 10/4/2017     Sig - Route: Take 2 tablets by mouth Every 4 (Four) Hours As Needed for Mild Pain . - Oral    albuterol " (PROVENTIL) nebulizer solution 0.5% 2.5 mg/0.5mL 2.5 mg 4 Times Daily PRN 10/7/2017     Sig - Route: Take 0.5 mL by nebulization 4 (Four) Times a Day As Needed for Wheezing or Shortness of Air. - Nebulization    amLODIPine (NORVASC) tablet 5 mg 5 mg Daily 10/5/2017     Sig - Route: Take 1 tablet by mouth Daily. - Oral    ampicillin (PRINCIPEN) capsule 500 mg 500 mg Every 12 Hours Scheduled 10/9/2017 10/12/2017    Sig - Route: Take 1 capsule by mouth Every 12 (Twelve) Hours. - Oral    atorvastatin (LIPITOR) tablet 10 mg 10 mg Nightly 10/4/2017     Sig - Route: Take 1 tablet by mouth Every Night. - Oral    budesonide-formoterol (SYMBICORT) 160-4.5 MCG/ACT inhaler 2 puff 2 puff 2 Times Daily - RT 10/8/2017     Sig - Route: Inhale 2 puffs 2 (Two) Times a Day. - Inhalation    castor oil-balsam peru (VENELEX) ointment  Every 12 Hours Scheduled 10/5/2017     Sig - Route: Apply  topically Every 12 (Twelve) Hours. - Topical    Cosign for Ordering: Accepted by Paula Silver MD on 10/5/2017 12:03 PM    clonazePAM (KlonoPIN) tablet 0.25 mg 0.25 mg 2 Times Daily PRN 10/4/2017     Sig - Route: Take 0.5 tablets by mouth 2 (Two) Times a Day As Needed for Seizures. - Oral    dextrose (D50W) solution 25 g 25 g Every 15 Minutes PRN 10/4/2017     Sig - Route: Infuse 50 mL into a venous catheter Every 15 (Fifteen) Minutes As Needed for Low Blood Sugar (Blood Sugar Less Than 70, Patient Has IV Access - Unresponsive, NPO or Unable To Safely Swallow). - Intravenous    dextrose (GLUTOSE) oral gel 15 g 15 g Every 15 Minutes PRN 10/4/2017     Sig - Route: Take 15 g by mouth Every 15 (Fifteen) Minutes As Needed for Low Blood Sugar (Blood Sugar Less Than 70, Patient Alert, Is Not NPO & Can Safely Swallow). - Oral    escitalopram (LEXAPRO) tablet 10 mg 10 mg Daily 10/5/2017     Sig - Route: Take 1 tablet by mouth Daily. - Oral    glucagon (GLUCAGEN) injection 1 mg 1 mg Every 15 Minutes PRN 10/4/2017     Sig - Route: Inject 1 mg under the skin  "Every 15 (Fifteen) Minutes As Needed (Blood Glucose Less Than 70 - Patient Without IV Access - Unresponsive, NPO or Unable To Safely Swallow). - Subcutaneous    heparin (porcine) 5000 UNIT/ML injection 5,000 Units 5,000 Units Every 8 Hours Scheduled 10/4/2017     Sig - Route: Inject 1 mL under the skin Every 8 (Eight) Hours. - Subcutaneous    hydrALAZINE (APRESOLINE) tablet 75 mg 75 mg 3 Times Daily 10/4/2017     Sig - Route: Take 75 mg by mouth 3 (Three) Times a Day. - Oral    HYDROcodone-acetaminophen (NORCO) 5-325 MG per tablet 1 tablet 1 tablet Every 4 Hours PRN 10/4/2017     Sig - Route: Take 1 tablet by mouth Every 4 (Four) Hours As Needed for Moderate Pain . - Oral    influenza vac split quad (FLUZONE,FLUARIX,AFLURIA) injection 0.5 mL 0.5 mL During Hospitalization 10/11/2017     Sig - Route: Inject 0.5 mL into the shoulder, thigh, or buttocks During Hospitalization for Immunization. - Intramuscular    insulin lispro (humaLOG) injection 0-7 Units 0-7 Units 4 Times Daily Before Meals & Nightly 10/4/2017     Sig - Route: Inject 0-7 Units under the skin 4 (Four) Times a Day Before Meals & at Bedtime. - Subcutaneous    ipratropium-albuterol (DUO-NEB) nebulizer solution 3 mL 3 mL 4 Times Daily - RT 10/7/2017     Sig - Route: Take 3 mL by nebulization 4 (Four) Times a Day. - Nebulization    isosorbide mononitrate (IMDUR) 24 hr tablet 60 mg 60 mg Daily 10/5/2017     Sig - Route: Take 1 tablet by mouth Daily. - Oral    Magnesium Sulfate 2 gram infusion- Mg 1.6 - 1.9 mg/dL 2 g As Needed 10/4/2017     Sig - Route: Infuse 50 mL into a venous catheter As Needed (Mg 1.6 - 1.9 mg/dL). - Intravenous    Linked Group 1:  \"Or\" Linked Group Details        magnesium sulfate 3 gram infusion (1gm x 3) - Mg 1.1 - 1.5 mg/dL 1 g As Needed 10/4/2017     Sig - Route: Infuse 100 mL into a venous catheter As Needed (Mg 1.1 - 1.5 mg/dL). - Intravenous    Linked Group 1:  \"Or\" Linked Group Details        magnesium sulfate 4 gram infusion- " "Mg less than or equal to 1 mg/dL 4 g As Needed 10/4/2017     Sig - Route: Infuse 100 mL into a venous catheter As Needed (Mg less than or equal to 1 mg/dL). - Intravenous    Linked Group 1:  \"Or\" Linked Group Details        miconazole (MICOTIN) 2 % cream  Every 12 Hours Scheduled 10/10/2017     Sig - Route: Apply  topically Every 12 (Twelve) Hours. - Topical    Cosign for Ordering: Accepted by Kelli JIMENES MD on 10/10/2017  3:47 PM    multivitamin with minerals 1 tablet 1 tablet Daily 10/5/2017     Sig - Route: Take 1 tablet by mouth Daily. - Oral    ondansetron (ZOFRAN) injection 4 mg 4 mg Every 6 Hours PRN 10/4/2017     Sig - Route: Infuse 2 mL into a venous catheter Every 6 (Six) Hours As Needed for Nausea or Vomiting. - Intravenous    Linked Group 2:  \"Or\" Linked Group Details        ondansetron (ZOFRAN) tablet 4 mg 4 mg Every 6 Hours PRN 10/4/2017     Sig - Route: Take 1 tablet by mouth Every 6 (Six) Hours As Needed for Nausea or Vomiting. - Oral    Linked Group 2:  \"Or\" Linked Group Details        pantoprazole (PROTONIX) EC tablet 40 mg 40 mg Every Early Morning 10/7/2017     Sig - Route: Take 1 tablet by mouth Every Morning. - Oral    phenylephrine-shark liver oil-mineral oil-petrolatum (PREPARATION H) 0.25-3-14-71.9 % rectal ointment  3 Times Daily PRN 10/4/2017     Sig - Route: Insert  into the rectum 3 (Three) Times a Day As Needed for Hemorrhoids. - Rectal    potassium chloride (KLOR-CON) packet 40 mEq 40 mEq As Needed 10/4/2017     Sig - Route: Take 40 mEq by mouth As Needed (potassium replacement, see admin instructions). - Oral    Linked Group 3:  \"Or\" Linked Group Details        potassium chloride (MICRO-K) CR capsule 40 mEq 40 mEq As Needed 10/4/2017     Sig - Route: Take 4 capsules by mouth As Needed (potassium replacement.  see admin instructions). - Oral    Linked Group 3:  \"Or\" Linked Group Details        potassium chloride 10 mEq in 100 mL IVPB 10 mEq Every 1 Hour PRN 10/4/2017     Sig - " "Route: Infuse 100 mL into a venous catheter Every 1 (One) Hour As Needed (potassium protocol PERIPHERAL - see admin instructions). - Intravenous    Linked Group 3:  \"Or\" Linked Group Details        saccharomyces boulardii (FLORASTOR) capsule 250 mg 250 mg 2 Times Daily 10/4/2017     Sig - Route: Take 1 capsule by mouth 2 (Two) Times a Day. - Oral    simethicone (MYLICON) chewable tablet 80 mg 80 mg Every 6 Hours PRN 10/4/2017     Sig - Route: Chew 1 tablet Every 6 (Six) Hours As Needed for Flatulence. - Oral    sodium chloride 0.9 % flush 1-10 mL 1-10 mL As Needed 10/4/2017     Sig - Route: Infuse 1-10 mL into a venous catheter As Needed for Line Care. - Intravenous    vancomycin oral solution 125 mg 125 mg Every 6 Hours Scheduled 10/4/2017     Sig - Route: Take 2.5 mL by mouth Every 6 (Six) Hours. - Oral    sodium chloride 0.9 % infusion (Discontinued) 75 mL/hr Continuous 10/8/2017 10/10/2017    Sig - Route: Infuse 75 mL/hr into a venous catheter Continuous. - Intravenous          "

## 2017-10-11 NOTE — PROGRESS NOTES
Adult Nutrition  Assessment/PES    Patient Name:  Joy Bueno  YOB: 1950  MRN: [id number]  Admit Date:  10/4/2017    Assessment Date:  10/11/2017    Comments:            Reason for Assessment       10/11/17 0800    Reason for Assessment    Reason For Assessment/Visit length of stay    Time Spent (min) 5                              Problem/Interventions:        Problem 1       10/11/17 0800    Nutrition Diagnoses Problem 1    Problem 1 Nutrition Appropriate for Condition at this Time    Signs/Symptoms (evidenced by) PO Intake                          Education/Evaluation       10/11/17 0800    Monitor/Evaluation    Monitor Per protocol        Electronically signed by:  Ashleigh Fang RD  10/11/17 8:00 AM

## 2017-10-11 NOTE — PLAN OF CARE
Problem: Patient Care Overview (Adult)  Goal: Plan of Care Review  Outcome: Ongoing (interventions implemented as appropriate)    10/11/17 1549   Coping/Psychosocial Response Interventions   Plan Of Care Reviewed With patient   Patient Care Overview   Progress progress toward functional goals is gradual   Outcome Evaluation   Outcome Summary/Follow up Plan Pt is cooperative with PT and participates in sitting EOB. Pt still dependent with all bed mobility with no t/f attempted. Nursing home/24-7 care recommended at this time, however pt verbalizes she plans to go home with help of a neighbor. Spoke with CM regarding concern for pt's unsafe d/c home.         Problem: Inpatient Physical Therapy  Goal: Bed Mobility Goal LTG- PT  Outcome: Ongoing (interventions implemented as appropriate)    10/05/17 1115 10/11/17 1549   Bed Mobility PT LTG   Bed Mobility PT LTG, Date Established 10/05/17 --    Bed Mobility PT LTG, Time to Achieve 2 wks --    Bed Mobility PT LTG, Activity Type all bed mobility --    Bed Mobility PT LTG, [place] Level maximum assist (25% patient effort);2 person assist required --    Bed Mobility PT LTG, Outcome --  goal ongoing       Goal: Transfer Training Goal 1 LTG- PT  Outcome: Ongoing (interventions implemented as appropriate)    10/05/17 1115 10/11/17 1549   Transfer Training PT LTG   Transfer Training PT LTG, Date Established 10/05/17 --    Transfer Training PT LTG, Time to Achieve 2 wks --    Transfer Training PT LTG, Activity Type sit to stand/stand to sit --    Transfer Training PT LTG, [place] Level maximum assist (25% patient effort);2 person assist required  (stable VS) --    Transfer Training PT LTG, Assist Device walker, rolling --    Transfer Training PT LTG, Outcome --  goal ongoing       Goal: Static Sitting Balance Goal STG- PT  Outcome: Outcome(s) achieved Date Met:  10/11/17    10/05/17 1115 10/11/17 1549   Static Sitting Balance PT STG   Static Sitting Balance PT STG, Date  Established 10/05/17 --    Static Sitting Balance PT STG, Time to Achieve 1 wk --    Static Sitting Balance PT STG, [place] Level moderate assist (50% patient effort) --    Static Sitting Balance PT STG, Outcome --  goal met       Goal: Dynamic Sitting Balance Goal LTG- PT  Outcome: Ongoing (interventions implemented as appropriate)    10/05/17 1115 10/11/17 1549   Dynamic Sitting Balance PT LTG   Dynamic Sitting Balance PT LTG, Date Established 10/05/17 --    Dynamic Sitting Balance PT LTG, Time to Achieve 2 wks --    Dynamic Sitting Balance PT LTG, [place] Level moderate assist (50% patient effort);2 person assist required --    Dynamic Sitting Balance PT LTG, Outcome --  goal partially met       Goal: Static Standing Balance Goal LTG- PT  Outcome: Ongoing (interventions implemented as appropriate)    10/05/17 1115 10/11/17 1549   Static Standing Balance PT LTG   Static Standing Balance PT LTG, Date Established 10/05/17 --    Static Standing Balance PT LTG, Time to Achieve 2 wks --    Static Standing Balance PT LTG, [place] Level maximum assist (25% patient effort);2 person assist required --    Static Standing Balance PT LTG, Assist Device assistive Device --    Static Standing Balance PT LTG, Outcome --  goal ongoing

## 2017-10-11 NOTE — PLAN OF CARE
Problem: Patient Care Overview (Adult)  Goal: Plan of Care Review  Outcome: Ongoing (interventions implemented as appropriate)    10/10/17 1756 10/10/17 2120 10/11/17 0259   Coping/Psychosocial Response Interventions   Plan Of Care Reviewed With --  patient --    Patient Care Overview   Progress no change --  --    Outcome Evaluation   Outcome Summary/Follow up Plan --  --  Good I&O. pain mangment effective. Mininmal activity. requires extesive assit with ADLs. Turned and repositioined every 2 hours and PRN.          Problem: Pain, Acute (Adult)  Goal: Acceptable Pain Control/Comfort Level  Outcome: Ongoing (interventions implemented as appropriate)    Problem: Fall Risk (Adult)  Goal: Absence of Falls  Outcome: Ongoing (interventions implemented as appropriate)    Problem: Infection, Risk/Actual (Adult)  Goal: Infection Prevention/Resolution  Outcome: Ongoing (interventions implemented as appropriate)

## 2017-10-11 NOTE — PROGRESS NOTES
Continued Stay Note  [facility]     Patient Name: Joy Bueno  MRN: [id number]  Today's Date: 10/11/2017    Admit Date: 10/4/2017          Discharge Plan       10/11/17 1329    Case Management/Social Work Plan    Plan Social work called Raghavendra Moncada with adult protective services and faxed records to him again.  The patient had been agreeing to go to rehab and today she changed her mind and decided she wanted to go home instead.  Social work explained this to Raghavendra Moncada with adult protection and he was going to call her daughter as well.    Patient/Family In Agreement With Plan yes      10/11/17 1317    Case Management/Social Work Plan    Plan home with home health    Patient/Family In Agreement With Plan yes    Additional Comments Per Saloni at [facility], Mrs. Bueno does have skilled days left however she is into her copay days. She is requesting that Mrs. Bueno work with their financial department to apply for long term Medicaid so that Medicaid will pay the additional copay for skilled services there. I discussed this with Mrs. Bueno at the bedside. She stated that she will not do this. She states that  [facility] is trying to trick her into staying long term. I discussed with Mrs. Bueno what she wants to do when her skilled days are gone if she cannot mobilize on her own. She stated that she refuses to go anywhere but home. We discussed the risks of going home alone which include safety issues, risks of getting sicker, risks of rehospitalizations that could possibly lead to death. She refuses to discuss any other option other than home. The ambulance has been rescheduled to transport Mrs. Bueno home today at 1700. She is requesting home health care. She has no preference in agency. I called [facility] to [facility]. They will follow her with skilled nursing, PT, OT, and . I have notified Mar with  of Mrs. Bueno decision to return home. She is  going to notify APS. Mrs. Bueno will need a hospital bed. Referral called to [facility]. They will deliver one to Mrs. Bueno home once she is discharged. Mrs. Bueon is going to need oral vancomycin. I have called referral to Graciela with [facility]. THey are going to deliver her supply to the bedside today.      10/11/17 1313    Case Management/Social Work Plan    Plan home with home health              Discharge Codes       10/11/17 1317    Discharge Codes    Discharge Codes 86  R- To home health [facility] org        Expected Discharge Date and Time     Expected Discharge Date Expected Discharge Time    Oct 11, 2017             ROGELIO Monsivais

## 2017-10-11 NOTE — DISCHARGE PLACEMENT REQUEST
"Joy Loyd (67 y.o. Female)     Date of Birth Social Security Number Address Home Phone MRN    [id number]  [id number] [id number] [phone] [id number]    Yarsani Marital Status          None        Admission Date Admission Type Admitting Provider Attending Provider Department, Room/Bed    10/4/17 Emergency Kelli Worrell MD Reddy, Mayuri V, MD [facility] 4H, S473/1    Discharge Date Discharge Disposition Discharge Destination         [facility] [place]             Attending Provider: Kelli JIMENES MD     Allergies:  No Known Allergies    Isolation:  Spore   Infection:  C.difficile (10/05/17)   Code Status:  Conditional    Ht:  66\" (167.6 cm)   Wt:  336 lb 6 oz (153 kg)    Admission Cmt:  None   Principal Problem:  Generalized weakness [R53.1]                 Active Insurance as of 10/4/2017     Primary Coverage     Payor Plan Insurance Group Employer/Plan Group    MEDICARE MEDICARE A & B      Payor Plan Address Payor Plan Phone Number Effective From Effective To    [address] [phone] 5/1/2016     [place] [address]       Subscriber Name Subscriber Birth Date Member ID       JOY LOYD 1950 [id number]           Secondary Coverage     Payor Plan Insurance Group Employer/Plan Group    [place]      Payor Plan Address Payor Plan Phone Number Effective From Effective To    [address] [address]  5/1/2016     [place] [address]       Subscriber Name Subscriber Birth Date Member ID       JOY LOYD 1950 [id number]                 Emergency Contacts      (Rel.) Home Phone Work Phone Mobile Phone    Tessie Dorado (Daughter) [phone] --        Hospital Bed [[id number]] (Order [id number])   General Supply    Date: 10/11/2017   Department: [address]   Ordering/Authorizing: Kelli JIMENES MD           Order History  Outpatient       Date/Time Action Taken " User Additional Information       10/11/17 1313 Sign Darryl Shahid Ordering Mode: Verbal with readback           Order Details        Frequency Duration Priority Order Class       None None Routine Hospital Performed           Start Date/Time        Start Date       10/11/17           Order Questions        Question Answer Comment       Equipment:  Hospital Bed, Semi-Electirc w/ Mattress & w/ Rails        Length of Need (99 Months = Lifetime) 99 Months = Lifetime        Note:  Custom values to enter length of need for DME           Process Instructions        By signing this order, the Authorizing Provider is attesting that they have completed a face-to-face evaluation of the patient, to determine their need for this equipment, in the last six months. If additional providers completed this evaluation, please enter their names in the second question below.              Verbal Order Info        Action Created on Order Mode Entered by Responsible Provider Signed by Signed on       Ordering 10/11/17 1313 Verbal with readback Darryl JIMENES MD             Source Order Set / Preference List        Preference List       AMB SUPPLIES [1416]           Clinical Indications           ICD-10-CM ICD-9-CM       Morbid obesity with body mass index of 50.0-59.9 in adult     E66.01  Z68.43 278.01  V85.43       Adult failure to thrive syndrome     R62.7 783.7       Impaired mobility and ADLs     Z74.09 799.89       Type 2 diabetes mellitus with complication, with long-term current use of insulin     E11.8  Z79.4 250.90  V58.67       Morbid obesity     E66.01 278.01       Bilateral leg pain     M79.604  M79.605 729.5       Generalized weakness     R53.1 780.79           Reprint Order Requisition        HOSPITAL BED (Order #[id number]) on 10/11/17         Encounter-Level Cardiology Documents:        There are no encounter-level cardiology documents.         Encounter        View Encounter         Order  Provider Info            Office phone Pager E-mail       Ordering User Darryl Shahid -- -- --       Authorizing Provider Kelli JIMENES [phone] -- --       Attending Provider Kelli JIMENES [phone] -- --       Entered By Darryl Shahid -- -- --       Ordering Provider Kelli JIMENES [phone] -- --           Linked Charges        No charges linked to this procedure         Order Transmittal Tracking        HOSPITAL BED (Order #[id number]) on 10/11/17         Authorized by:  Kelli Worrell MD  (NPI: [id number])                    Discharge Summary      SUELLEN Richey at 10/11/2017  8:52 AM              [facility]  DISCHARGE SUMMARY       Date of Admission: 10/4/2017  Date of Discharge:  10/11/2017  Primary Care Physician: Trevon Delarosa MD  Consulting Physician(s)             None            Discharge Diagnoses:  Active Hospital Problems (** Indicates Principal Problem)    Diagnosis Date Noted   • **Generalized weakness [R53.1] 10/09/2017   • Hypertension [I10] 08/03/2017     Priority: Medium   • Hypokalemia [E87.6] 10/04/2017   • Hypomagnesemia [E83.42] 10/04/2017   • Clostridium difficile diarrhea [A04.72] 10/04/2017   • Urinary tract infection associated with indwelling urethral catheter [T83.511A, N39.0] 10/04/2017   • Bilateral leg pain [M79.604, M79.605] 10/04/2017   • Leukocytosis [D72.829] 09/02/2017   • CKD (chronic kidney disease) Stage 3 [N18.9] 08/28/2017     Followed by nephrology associates (improving)     • Anemia [D64.9] 08/23/2017     Iron deficient     • Asthma [J45.909] 08/03/2017   • Diabetes mellitus [E11.9] 08/03/2017   • Anxiety [F41.9]    • Morbid obesity [E66.01]       Resolved Hospital Problems    Diagnosis Date Noted Date Resolved   No resolved problems to display.       Presenting Problem/History of Present Illness  Urinary tract infection associated with indwelling urethral catheter, initial encounter  "[T83.511A, N39.0]     Chief Complaint on Day of Discharge: FU UTI, C Diff    History of Present Illness on Day of Discharge:   Patient resting comfortably in bed this morning with nursing staff at bedside.  She feels much better this morning than she has been feeling.  She adamantly refuses long term placement and if she is unable to go to rehab, she chooses to go home with home health, even with very little family support.  \"If I go home and die, that is my business, not yours\".     Hospital Course  Patient is a 67 y.o. -American female with a past medical history significant for hypertension, diabetes, osteoarthritis, asthma, renal failure, and anxiety.  The patient has had 3 recent hospitalizations in August.  She was admitted from 8/8 to 8/12/2017 with renal failure status post renal biopsy.  She was discharged to rehabilitation and continued to follow-up with nephrology and colonoscopy was offered during that hospitalization as further workup for her anemia which was declined by the patient.  She was also treated with Rocephin for an Escherichia coli UTI.  On 8/23 she was readmitted to [facility] from rehabilitation with increased weakness and stayed until 8/30/2017.  During that hospitalization, the patient continued to have anemia requiring blood transfusion as well as iron transfusion.  The patient consented to an EGD on 8/25/2017 which showed reactive gastropathy with minimal chronic gastritis.  She also had a colonoscopy on 8/28/2017 that showed scattered diverticula and grade 1 internal hemorrhoids.  She was discharged to [facility] from that hospitalization for rehabilitation.  She was then readmitted from September 2 to September 7, 2017 with a right middle lobe/right lower lobe pneumonia and a right pleural effusion with acute on chronic respiratory failure and hypoxia.  She was given Levaquin therapy and was discharged back to [facility] on oral antibiotics.  The patient was " released approximately one week prior to her presentation back at [facility].  She reported that during her week home she became progressively weak with increasing lower extremity pain and edema.  Evaluation in the emergency department revealed a urinalysis significant for urinary tract infection.  Her chest x-ray was stable from her last admission she was noted to have a hemoglobin of 7.8 and a hematocrit of 24.7.  The patient was admitted to Hospital medicine services for further evaluation and treatment.  She was also noted to have an electrolyte derangement and did have electrolyte replacement.  The patient also complained of diarrhea during this hospitalization.  She has been on several antibiotics over the past 3 months.  Stool for C. difficile PCR did come back positive and she was started on oral vancomycin 4 times daily.  She complained of increasing fatigue and weakness as well as bilateral leg pain.  A bilateral venous duplex and a V/Q scan were both performed to check for DVT/PE which were negative.  Her fatigue is likely related to her urinary tract infection.  Her renal function is now back to her baseline creatinine of about 2.  The patient's hemoglobin remained stable.  The patient had been treated for urinary tract infection with vancomycin and Zosyn when she was admitted through the emergency department.  Her urine culture grew out 40,000-50,000 CFU per mL of enterococcus faecalis and she was switched to oral ampicillin ×3 days.  She will receive her last dose of ampicillin prior to her discharge home  The patient was seen by physical therapy as well as occupational therapy during this hospitalization.  Mobility has been a big issue for her.  She is agreeable to skilled nursing for rehab but she has very few skilled days left.  She is unwilling to be placed in a long term bed and she refuses to go to a facility as a medicaid pending, with the possibility of being long term.  She would  rather go home and take her chances.  She has been informed of the risks of going home alone in her current weakened state but she is adamant that she would rather go home than to a long term bed.  We will order a hospital bed for the patient due to her chronic debilitated state.  She requires the use of a hospital bed due to being unable to reposition in bed.  She also has diabetes and chronic leg pain and requires leg elevation.         Procedures Performed         Consults:   Consults     No orders found from 9/5/2017 to 10/5/2017.          Pertinent Test Results:  Lab Results   Component Value Date    WBC 8.54 10/10/2017    HGB 7.7 (L) 10/10/2017    HCT 25.5 (L) 10/10/2017    MCV 92.7 10/10/2017     10/10/2017     Lab Results   Component Value Date    GLUCOSE 131 (H) 10/10/2017    CALCIUM 8.1 (L) 10/10/2017     10/10/2017    K 4.3 10/10/2017    CO2 25.0 10/10/2017     (H) 10/10/2017    BUN 43 (H) 10/10/2017    CREATININE 2.00 (H) 10/10/2017    EGFRIFAFRI 30 (L) 10/10/2017    BCR 21.5 10/10/2017    ANIONGAP 8.0 10/10/2017     Imaging Results (last 7 days)     Procedure Component Value Units Date/Time    XR Chest 1 View [[id number]] Collected:  10/04/17 1529     Updated:  10/04/17 1542    Narrative:       EXAMINATION: XR CHEST 1 VW-10/04/2017:      INDICATION: Weak/Dizzy/AMS, triage protocol.      COMPARISON: 09/05/2017.     FINDINGS: The heart is large. There is patchy bibasilar airspace  disease. Aeration of the lungs has, however, improved slightly when  compared with 09/05/2017 and there are no new abnormalities.           Impression:       Cardiomegaly with bibasilar airspace disease, slightly  improved when compared with 09/05/2017.     D:  10/04/2017  E:  10/04/2017     This report was finalized on 10/4/2017 3:40 PM by Dr. Nate Smith MD.       NM Lung Ventilation Perfusion [[id number]]  (Abnormal) Collected:  10/04/17 1654     Updated:  10/04/17 1930    Narrative:       EXAM:    NM Lung  Perfusion and Ventilation Scan    CLINICAL HISTORY:    67 years old, female; Anemia, unspecified; Morbid (severe) obesity due to   excess calories; Chronic kidney disease, stage 4 (severe); Urinary tract   infection, site not specified; Diarrhea, unspecified; Adult failure to thrive;   Body mass index (bmi) 50-59.9 , adult; Infection and inflammatory reaction due   to indwelling urethral catheter, initial encounter; Signs and symptoms;   Shortness of breath; Additional info: SOA, immobility elevated d dimer    TECHNIQUE:    Nuclear Medicine ventilation and perfusion images of the lungs were obtained   in multiple projections following inhalation of 19.50 mCi Xenon-133 gas and   injection of 5.04 mCi Tc99m MAA.    COMPARISON:    CR - XR CHEST 1 VW 10/4/2017 1:51:04 PM    FINDINGS:    Normal ventilation bilaterally with adequate washout and no significant areas   of trapping. Normal and homogeneous uptake of radiotracer throughout the lungs   on ventilation and perfusion scans.       Incidental note is made of marked cardiomegaly concordant with the chest   radiograph.    Impression:         Unremarkable study without evidence of pulmonary embolism.            THIS DOCUMENT HAS BEEN ELECTRONICALLY SIGNED BY LETHA CAMPOVERDE MD        Cultures:  Blood Culture   Date Value Ref Range Status   10/04/2017 No growth at 5 days  Final   10/04/2017 Staphylococcus, coagulase negative (A)  Final     Comment:     Suggests contamination  Susceptibility will not be done unless Doctor notifies Lab       Urine Culture   Date Value Ref Range Status   10/08/2017 No growth at 2 days  Final   10/04/2017 40,000-50,000 CFU/mL Enterococcus faecalis (A)  Final     Stool Culture   Date Value Ref Range Status   10/05/2017   Final    No Salmonella, Shigella, Campylobacter, E. coli O157, or Vibrio isolated         Condition on Discharge:  Stable    Physical Exam on Discharge:BP (!) 188/82 (BP Location: Right arm, Patient Position: Lying)  Pulse 101  " Temp 98 °F (36.7 °C) (Oral)   Resp 18  Ht 66\" (167.6 cm)  Wt (!) 336 lb 6 oz (153 kg)  SpO2 (!) 85%  BMI 54.29 kg/m2  Physical Exam    General: resting in bed, NAD, no family at bedside.   HEENT: Normocephalic, mucous membranes moist, pupils equal  Neck: Supple, trachea midline  Cardiovascular: Regular rate and rhythm, S1-S2, no murmur  Respiratory: Clear to auscultation bilaterally, even unlabored breathing on room air  Abdomen: Soft, nontender, obese, bowel sounds +  Skin: Clean, dry, intact, no lesions or sores  Extremities: chronic venous stasis changes with BLE edema  Neuro: Alert, oriented ×4, appropriate and cooperative    Discharge Disposition      Discharge Medications   MylesJoy   Home Medication Instructions JESSICA:[id number]    Printed on:10/11/17 9403   Medication Information                      acetaminophen (TYLENOL) 325 MG tablet  Take 650 mg by mouth Every 4 (Four) Hours As Needed for Mild Pain (1-3).             albuterol (PROVENTIL HFA;VENTOLIN HFA) 108 (90 BASE) MCG/ACT inhaler  Inhale 2 puffs Every 4 (Four) Hours As Needed for Wheezing.             amLODIPine (NORVASC) 5 MG tablet  Take 1 tablet by mouth Daily.             atorvastatin (LIPITOR) 10 MG tablet  Take 10 mg by mouth Every Night.             Cholecalciferol (VITAMIN D3) 18768 units tablet  Take 50,000 Units by mouth Every 7 (Seven) Days.             clonazePAM (KlonoPIN) 0.5 MG tablet  Take 0.25 mg by mouth 2 (Two) Times a Day As Needed for Seizures.             escitalopram (LEXAPRO) 10 MG tablet  Take 1 tablet by mouth Daily.             furosemide (LASIX) 40 MG tablet  Take 40 mg by mouth 2 (Two) Times a Day.             hydrALAZINE (APRESOLINE) 50 MG tablet  Take 1.5 tablets by mouth 3 (Three) Times a Day.             HYDROcodone-acetaminophen (NORCO) 5-325 MG per tablet  Take 1 tablet by mouth Every 4 (Four) Hours As Needed.             insulin lispro (humaLOG) 100 UNIT/ML injection  Inject 0-7 Units under the " skin 4 (Four) Times a Day Before Meals & at Bedtime.             isosorbide mononitrate (IMDUR) 60 MG 24 hr tablet  Take 1 tablet by mouth Daily.             mometasone-formoterol (DULERA 200) 200-5 MCG/ACT inhaler  Inhale 2 puffs 2 (Two) Times a Day.             Multiple Vitamins-Minerals (MULTIVITAMIN ADULTS PO)  Take 1 tablet by mouth Daily.             phenylephrine-shark liver oil-mineral oil-petrolatum (PREPARATION H) 0.25-3-14-71.9 % rectal ointment  Insert  into the rectum 3 (Three) Times a Day As Needed for Hemorrhoids.             saccharomyces boulardii (FLORASTOR) 250 MG capsule  Take 1 capsule by mouth 2 (Two) Times a Day.             simethicone (MYLICON) 80 MG chewable tablet  Chew 80 mg Every 6 (Six) Hours As Needed for Flatulence.             vancomycin 50 MG/ML solution oral solution  Take 2.5 mL by mouth Every 6 (Six) Hours for 7 days. Indications: Clostridium Difficile Infection                 Discharge Diet:   Diet Instructions     Diet: Cardiac, Consistent Carbohydrate; Thin       Discharge Diet:   Cardiac  Consistent Carbohydrate      Fluid Consistency:  Thin                 Discharge Care Plan / Instructions:    Activity at Discharge:   Activity Instructions     Activity as Tolerated                     Follow-up Appointments  No future appointments.  Additional Instructions for the Follow-ups that You Need to Schedule     Discharge Follow-up with PCP    As directed    Follow Up Details:  1-2 weeks                 Test Results Pending at Discharge       SUELLEN Richey 10/11/17 1:09 PM    Time: Discharge 45 min    Please note that portions of this note may have been completed with a voice recognition program. Efforts were made to edit the dictations, but occasionally words are mistranscribed.         Electronically signed by SUELLEN Richey at 10/11/2017  1:22 PM

## 2017-10-11 NOTE — PROGRESS NOTES
Continued Stay Note  [facility]     Patient Name: Joy Bueno  MRN: [id number]  Today's Date: 10/11/2017    Admit Date: 10/4/2017          Discharge Plan       10/11/17 1317    Case Management/Social Work Plan    Plan home with home health    Patient/Family In Agreement With Plan yes    Additional Comments Per Saloni at [facility], Mrs. Bueno does have skilled days left however she is into her copay days. She is requesting that Mrs. Bueno work with their financial department to apply for long term Medicaid so that Medicaid will pay the additional copay for skilled services there. I discussed this with Mrs. Bueno at the bedside. She stated that she will not do this. She states that  [facility] is trying to trick her into staying long term. I discussed with Mrs. Bueno what she wants to do when her skilled days are gone if she cannot mobilize on her own. She stated that she refuses to go anywhere but home. We discussed the risks of going home alone which include safety issues, risks of getting sicker, risks of rehospitalizations that could possibly lead to death.   She refuses to discuss any other option other than home.     The ambulance has been rescheduled to transport Mrs. Bueno home today at 1700. She is requesting home health care. She has no preference in agency. I called referral to [facility]. They will follow her with skilled nursing, PT, OT, and . I have notified Mar with  of Mrs. Bueno decision to return home. She is going to notify [facility].    Mrs. Bueno will need a hospital bed. Referral called to [facility]. They will deliver one to Mrs. Bueno home once she is discharged. Mrs. Bueno is also going to need oral vancomycin. I have called referral to Graciela with [facility]. They are going to deliver her supply to the bedside today.    Mrs. Bueno' daughter Tessie has contacted the RN Helen and requested that I return her  call. I discussed this with Mrs. Bueno' and she does not want anyone to contact her daughter or give permission to discuss her discharge plan with her.       10/11/17 1313    Case Management/Social Work Plan    Plan home with home health              Discharge Codes       10/11/17 1317    Discharge Codes    Discharge Codes 86  R- To home health [facility]        Expected Discharge Date and Time     Expected Discharge Date Expected Discharge Time    Oct 11, 2017             Darryl Shahid

## 2017-10-11 NOTE — DISCHARGE SUMMARY
"    [facility] Medicine Services  DISCHARGE SUMMARY       Date of Admission: 10/4/2017  Date of Discharge:  10/11/2017  Primary Care Physician: Trevon Delarosa MD  Consulting Physician(s)             None            Discharge Diagnoses:  Active Hospital Problems (** Indicates Principal Problem)    Diagnosis Date Noted   • **Generalized weakness [R53.1] 10/09/2017   • Hypertension [I10] 08/03/2017     Priority: Medium   • Hypokalemia [E87.6] 10/04/2017   • Hypomagnesemia [E83.42] 10/04/2017   • Clostridium difficile diarrhea [A04.72] 10/04/2017   • Urinary tract infection associated with indwelling urethral catheter [T83.511A, N39.0] 10/04/2017   • Bilateral leg pain [M79.604, M79.605] 10/04/2017   • Leukocytosis [D72.829] 09/02/2017   • CKD (chronic kidney disease) Stage 3 [N18.9] 08/28/2017     Followed by nephrology associates (improving)     • Anemia [D64.9] 08/23/2017     Iron deficient     • Asthma [J45.909] 08/03/2017   • Diabetes mellitus [E11.9] 08/03/2017   • Anxiety [F41.9]    • Morbid obesity [E66.01]       Resolved Hospital Problems    Diagnosis Date Noted Date Resolved   No resolved problems to display.       Presenting Problem/History of Present Illness  Urinary tract infection associated with indwelling urethral catheter, initial encounter [T83.511A, N39.0]     Chief Complaint on Day of Discharge: FU UTI, C Diff    History of Present Illness on Day of Discharge:   Patient resting comfortably in bed this morning with nursing staff at bedside.  She feels much better this morning than she has been feeling.  She adamantly refuses long term placement and if she is unable to go to rehab, she chooses to go home with home health, even with very little family support.  \"If I go home and die, that is my business, not yours\".     Hospital Course  Patient is a 67 y.o. -American female with a past medical history significant for hypertension, diabetes, osteoarthritis, asthma, renal " failure, and anxiety.  The patient has had 3 recent hospitalizations in August.  She was admitted from 8/8 to 8/12/2017 with renal failure status post renal biopsy.  She was discharged to rehabilitation and continued to follow-up with nephrology and colonoscopy was offered during that hospitalization as further workup for her anemia which was declined by the patient.  She was also treated with Rocephin for an Escherichia coli UTI.  On 8/23 she was readmitted to [facility] from rehabilitation with increased weakness and stayed until 8/30/2017.  During that hospitalization, the patient continued to have anemia requiring blood transfusion as well as iron transfusion.  The patient consented to an EGD on 8/25/2017 which showed reactive gastropathy with minimal chronic gastritis.  She also had a colonoscopy on 8/28/2017 that showed scattered diverticula and grade 1 internal hemorrhoids.  She was discharged to [facility] from that hospitalization for rehabilitation.  She was then readmitted from September 2 to September 7, 2017 with a right middle lobe/right lower lobe pneumonia and a right pleural effusion with acute on chronic respiratory failure and hypoxia.  She was given Levaquin therapy and was discharged back to [facility] on oral antibiotics.  The patient was released approximately one week prior to her presentation back at [facility].  She reported that during her week home she became progressively weak with increasing lower extremity pain and edema.  Evaluation in the emergency department revealed a urinalysis significant for urinary tract infection.  Her chest x-ray was stable from her last admission she was noted to have a hemoglobin of 7.8 and a hematocrit of 24.7.  The patient was admitted to Hospital medicine services for further evaluation and treatment.  She was also noted to have an electrolyte derangement and did have electrolyte replacement.  The patient also complained  of diarrhea during this hospitalization.  She has been on several antibiotics over the past 3 months.  Stool for C. difficile PCR did come back positive and she was started on oral vancomycin 4 times daily.  She complained of increasing fatigue and weakness as well as bilateral leg pain.  A bilateral venous duplex and a V/Q scan were both performed to check for DVT/PE which were negative.  Her fatigue is likely related to her urinary tract infection.  Her renal function is now back to her baseline creatinine of about 2.  The patient's hemoglobin remained stable.  The patient had been treated for urinary tract infection with vancomycin and Zosyn when she was admitted through the emergency department.  Her urine culture grew out 40,000-50,000 CFU per mL of enterococcus faecalis and she was switched to oral ampicillin ×3 days.  She will receive her last dose of ampicillin prior to her discharge home  The patient was seen by physical therapy as well as occupational therapy during this hospitalization.  Mobility has been a big issue for her.  She is agreeable to skilled nursing for rehab but she has very few skilled days left.  She is unwilling to be placed in a long term bed and she refuses to go to a facility as a medicaid pending, with the possibility of being long term.  She would rather go home and take her chances.  She has been informed of the risks of going home alone in her current weakened state but she is adamant that she would rather go home than to a long term bed.  We will order a hospital bed for the patient due to her chronic debilitated state.  She requires the use of a hospital bed due to being unable to reposition in bed.  She also has diabetes and chronic leg pain and requires leg elevation.         Procedures Performed         Consults:   Consults     No orders found from 9/5/2017 to 10/5/2017.          Pertinent Test Results:  Lab Results   Component Value Date    WBC 8.54 10/10/2017    HGB 7.7 (L)  10/10/2017    HCT 25.5 (L) 10/10/2017    MCV 92.7 10/10/2017     10/10/2017     Lab Results   Component Value Date    GLUCOSE 131 (H) 10/10/2017    CALCIUM 8.1 (L) 10/10/2017     10/10/2017    K 4.3 10/10/2017    CO2 25.0 10/10/2017     (H) 10/10/2017    BUN 43 (H) 10/10/2017    CREATININE 2.00 (H) 10/10/2017    EGFRIFAFRI 30 (L) 10/10/2017    BCR 21.5 10/10/2017    ANIONGAP 8.0 10/10/2017     Imaging Results (last 7 days)     Procedure Component Value Units Date/Time    XR Chest 1 View [[id number]] Collected:  10/04/17 1529     Updated:  10/04/17 1542    Narrative:       EXAMINATION: XR CHEST 1 VW-10/04/2017:      INDICATION: Weak/Dizzy/AMS, triage protocol.      COMPARISON: 09/05/2017.     FINDINGS: The heart is large. There is patchy bibasilar airspace  disease. Aeration of the lungs has, however, improved slightly when  compared with 09/05/2017 and there are no new abnormalities.           Impression:       Cardiomegaly with bibasilar airspace disease, slightly  improved when compared with 09/05/2017.     D:  10/04/2017  E:  10/04/2017     This report was finalized on 10/4/2017 3:40 PM by Dr. Nate Smith MD.       NM Lung Ventilation Perfusion [[id number]]  (Abnormal) Collected:  10/04/17 1654     Updated:  10/04/17 1930    Narrative:       EXAM:    NM Lung Perfusion and Ventilation Scan    CLINICAL HISTORY:    67 years old, female; Anemia, unspecified; Morbid (severe) obesity due to   excess calories; Chronic kidney disease, stage 4 (severe); Urinary tract   infection, site not specified; Diarrhea, unspecified; Adult failure to thrive;   Body mass index (bmi) 50-59.9 , adult; Infection and inflammatory reaction due   to indwelling urethral catheter, initial encounter; Signs and symptoms;   Shortness of breath; Additional info: SOA, immobility elevated d dimer    TECHNIQUE:    Nuclear Medicine ventilation and perfusion images of the lungs were obtained   in multiple projections following  "inhalation of 19.50 mCi Xenon-133 gas and   injection of 5.04 mCi Tc99m MAA.    COMPARISON:    CR - XR CHEST 1 VW 10/4/2017 1:51:04 PM    FINDINGS:    Normal ventilation bilaterally with adequate washout and no significant areas   of trapping. Normal and homogeneous uptake of radiotracer throughout the lungs   on ventilation and perfusion scans.       Incidental note is made of marked cardiomegaly concordant with the chest   radiograph.    Impression:         Unremarkable study without evidence of pulmonary embolism.            THIS DOCUMENT HAS BEEN ELECTRONICALLY SIGNED BY LETHA CAMPOVERDE MD        Cultures:  Blood Culture   Date Value Ref Range Status   10/04/2017 No growth at 5 days  Final   10/04/2017 Staphylococcus, coagulase negative (A)  Final     Comment:     Suggests contamination  Susceptibility will not be done unless Doctor notifies Lab       Urine Culture   Date Value Ref Range Status   10/08/2017 No growth at 2 days  Final   10/04/2017 40,000-50,000 CFU/mL Enterococcus faecalis (A)  Final     Stool Culture   Date Value Ref Range Status   10/05/2017   Final    No Salmonella, Shigella, Campylobacter, E. coli O157, or Vibrio isolated         Condition on Discharge:  Stable    Physical Exam on Discharge:BP (!) 188/82 (BP Location: Right arm, Patient Position: Lying)  Pulse 101  Temp 98 °F (36.7 °C) (Oral)   Resp 18  Ht 66\" (167.6 cm)  Wt (!) 336 lb 6 oz (153 kg)  SpO2 (!) 85%  BMI 54.29 kg/m2  Physical Exam    General: resting in bed, NAD, no family at bedside.   HEENT: Normocephalic, mucous membranes moist, pupils equal  Neck: Supple, trachea midline  Cardiovascular: Regular rate and rhythm, S1-S2, no murmur  Respiratory: Clear to auscultation bilaterally, even unlabored breathing on room air  Abdomen: Soft, nontender, obese, bowel sounds +  Skin: Clean, dry, intact, no lesions or sores  Extremities: chronic venous stasis changes with BLE edema  Neuro: Alert, oriented ×4, appropriate and " cooperative    Discharge Disposition      Discharge Medications   Joy Bueno   Home Medication Instructions JESSICA:[id number]    Printed on:10/11/17 5198   Medication Information                      acetaminophen (TYLENOL) 325 MG tablet  Take 650 mg by mouth Every 4 (Four) Hours As Needed for Mild Pain (1-3).             albuterol (PROVENTIL HFA;VENTOLIN HFA) 108 (90 BASE) MCG/ACT inhaler  Inhale 2 puffs Every 4 (Four) Hours As Needed for Wheezing.             amLODIPine (NORVASC) 5 MG tablet  Take 1 tablet by mouth Daily.             atorvastatin (LIPITOR) 10 MG tablet  Take 10 mg by mouth Every Night.             Cholecalciferol (VITAMIN D3) 12312 units tablet  Take 50,000 Units by mouth Every 7 (Seven) Days.             clonazePAM (KlonoPIN) 0.5 MG tablet  Take 0.25 mg by mouth 2 (Two) Times a Day As Needed for Seizures.             escitalopram (LEXAPRO) 10 MG tablet  Take 1 tablet by mouth Daily.             furosemide (LASIX) 40 MG tablet  Take 40 mg by mouth 2 (Two) Times a Day.             hydrALAZINE (APRESOLINE) 50 MG tablet  Take 1.5 tablets by mouth 3 (Three) Times a Day.             HYDROcodone-acetaminophen (NORCO) 5-325 MG per tablet  Take 1 tablet by mouth Every 4 (Four) Hours As Needed.             insulin lispro (humaLOG) 100 UNIT/ML injection  Inject 0-7 Units under the skin 4 (Four) Times a Day Before Meals & at Bedtime.             isosorbide mononitrate (IMDUR) 60 MG 24 hr tablet  Take 1 tablet by mouth Daily.             mometasone-formoterol (DULERA 200) 200-5 MCG/ACT inhaler  Inhale 2 puffs 2 (Two) Times a Day.             Multiple Vitamins-Minerals (MULTIVITAMIN ADULTS PO)  Take 1 tablet by mouth Daily.             phenylephrine-shark liver oil-mineral oil-petrolatum (PREPARATION H) 0.25-3-14-71.9 % rectal ointment  Insert  into the rectum 3 (Three) Times a Day As Needed for Hemorrhoids.             saccharomyces boulardii (FLORASTOR) 250 MG capsule  Take 1 capsule by mouth 2 (Two)  Times a Day.             simethicone (MYLICON) 80 MG chewable tablet  Chew 80 mg Every 6 (Six) Hours As Needed for Flatulence.             vancomycin 50 MG/ML solution oral solution  Take 2.5 mL by mouth Every 6 (Six) Hours for 7 days. Indications: Clostridium Difficile Infection                 Discharge Diet:   Diet Instructions     Diet: Cardiac, Consistent Carbohydrate; Thin       Discharge Diet:   Cardiac  Consistent Carbohydrate      Fluid Consistency:  Thin                 Discharge Care Plan / Instructions:    Activity at Discharge:   Activity Instructions     Activity as Tolerated                     Follow-up Appointments  No future appointments.  Additional Instructions for the Follow-ups that You Need to Schedule     Discharge Follow-up with PCP    As directed    Follow Up Details:  1-2 weeks                 Test Results Pending at Discharge       SUELLEN Richey 10/11/17 1:09 PM    Time: Discharge 45 min    Please note that portions of this note may have been completed with a voice recognition program. Efforts were made to edit the dictations, but occasionally words are mistranscribed.

## 2017-10-11 NOTE — THERAPY TREATMENT NOTE
Acute Care - Physical Therapy Treatment Note  [facility]     Patient Name: Joy Bueno  : 1950  MRN: [id number]  Today's Date: 10/11/2017  Onset of Illness/Injury or Date of Surgery Date: 10/04/17  Date of Referral to PT: 10/04/17  Referring Physician: SUELLEN Guerra    Admit Date: 10/4/2017    Visit Dx:    ICD-10-CM ICD-9-CM   1. Urinary tract infection associated with indwelling urethral catheter, initial encounter T83.511A 996.64    N39.0 599.0   2. Chronic renal failure, stage 4 (severe) N18.4 585.4   3. Morbid obesity with body mass index of 50.0-59.9 in adult E66.01 278.01    Z68.43 V85.43   4. Chronic anemia D64.9 285.9   5. Diarrhea in adult patient R19.7 787.91   6. Adult failure to thrive syndrome R62.7 783.7   7. Impaired mobility and ADLs Z74.09 799.89   8. Impaired functional mobility, balance, gait, and endurance Z74.09 V49.89   9. Type 2 diabetes mellitus with complication, with long-term current use of insulin E11.8 250.90    Z79.4 V58.67   10. Morbid obesity E66.01 278.01   11. Bilateral leg pain M79.604 729.5    M79.605    12. Generalized weakness R53.1 780.79   13. Chronic kidney disease, unspecified CKD stage N18.9 585.9   14. Clostridium difficile diarrhea A04.72 008.45     Patient Active Problem List   Diagnosis   • Asthma   • Diabetes mellitus   • Hypertension   • Symptomatic anemia   • Morbid obesity   • Anxiety   • Anemia   • CKD (chronic kidney disease) Stage 3   • Gastritis   • Pleural effusion, right   • Leukocytosis   • Possible pneumonia of RML/RLL   • Hypokalemia   • Hypomagnesemia   • Clostridium difficile diarrhea   • Urinary tract infection associated with indwelling urethral catheter   • Bilateral leg pain   • Generalized weakness               Adult Rehabilitation Note       10/11/17 1509 10/09/17 1600 10/09/17 1520    Rehab Assessment/Intervention    Discipline (P)  physical therapist  -TS physical therapist  -EH occupational therapist  -TA    Document Type (P)   therapy note (daily note)  -TS therapy note (daily note)  - therapy note (daily note)  -TA    Subjective Information (P)  agree to therapy;no complaints  -TS agree to therapy;complains of;weakness  - agree to therapy;complains of;weakness  -TA    Patient Effort, Rehab Treatment (P)  good  -TS good  -EH good  -TA    Symptoms Noted During/After Treatment (P)  shortness of breath;fatigue  -TS fatigue  -EH fatigue  -TA    Precautions/Limitations  fall precautions;other (see comments)   spore precuations; impaired skin integrity  -EH fall precautions;other (see comments)   spore precautions, impaired skin integrity  -TA    Specific Treatment Considerations  PAINFUL BEHIND KNEES R>L; requires you to SLOWLY LOWER LES when lowering leg rest of chair.  -EH     Recorded by [TS] Gogo Hargrove V, PT Student [EH] Justa Guerra, PT [TA] Nate Bhardwaj, OT    Vital Signs    Pre Systolic BP Rehab  197  -  -TA    Pre Treatment Diastolic BP  86  -EH 86  -TA    Post Systolic BP Rehab  198  -  -TA    Post Treatment Diastolic BP  97  -EH 97  -TA    Pretreatment Heart Rate (beats/min) (P)  108  -TS 96  -EH     Intratreatment Heart Rate (beats/min)  98  -EH     Posttreatment Heart Rate (beats/min)  99  -EH     Pre SpO2 (%)  100  -  -TA    O2 Delivery Pre Treatment  supplemental O2  -EH supplemental O2  -TA    Intra SpO2 (%)  98  -EH 89  -TA    O2 Delivery Intra Treatment  room air  -EH room air  -TA    Post SpO2 (%)  97  -EH 97  -TA    O2 Delivery Post Treatment  supplemental O2  -EH supplemental O2  -TA    Pre Patient Position (P)  Supine  -TS Supine  -EH Supine  -TA    Intra Patient Position (P)  Sitting  -TS Sitting  -EH Sitting  -TA    Post Patient Position (P)  Supine  -TS Sitting  -EH Sitting  -TA    Recorded by [TS] Gogo Hargrove V, PT Student [EH] Justa Guerra, PT [TA] Nate Bhardwaj, OT    Pain Assessment    Pain Assessment (P)  Ferrell-Baker FACES  -TS Ferrell-Baker FACES  -EH Ferrell-Hernández FACES   -TA    Ferrell-Baker FACES Pain Rating (P)  4  -TS 6  -EH 6   With R knee flexion  -TA    Pain Score (P)  5  -TS 6  -EH 6  -TA    Post Pain Score  2  -EH 2  -TA    Pain Type (P)  Chronic pain  -TS Chronic pain  -EH Chronic pain  -TA    Pain Location (P)  Knee  -TS Knee  -EH Knee  -TA    Pain Orientation (P)  Right  -TS Right  -EH Right  -TA    Pain Intervention(s) (P)  Repositioned  -TS Repositioned;Ambulation/increased activity  -EH Repositioned;Ambulation/increased activity  -TA    Response to Interventions (P)  tolerated  -TS improved with positioning  -EH improved with positioning  -TA    Recorded by [TS] Gogo Hargrove V, PT Student [EH] Justa Guerra, PT [TA] Nate Bhardwaj OT    Vision Assessment/Intervention    Visual Impairment (P)  WFL  -TS      Recorded by [TS] Gogo Hargrove V, PT Student      Cognitive Assessment/Intervention    Current Cognitive/Communication Assessment (P)  functional  -TS functional  -EH functional  -TA    Orientation Status (P)  oriented x 4  -TS oriented to;person;place;situation  -EH oriented to;person;place;situation  -TA    Follows Commands/Answers Questions (P)  100% of the time  -% of the time;able to follow single-step instructions;needs cueing;needs increased time  -% of the time;able to follow single-step instructions;needs cueing;needs increased time;needs repetition  -TA    Personal Safety (P)  mild impairment;decreased insight to deficits  -TS decreased awareness, need for assist;decreased awareness, need for safety;mild impairment  -EH moderate impairment  -TA    Personal Safety Interventions (P)  fall prevention program maintained  -TS fall prevention program maintained;gait belt;nonskid shoes/slippers when out of bed  -EH fall prevention program maintained;gait belt;muscle strengthening facilitated;nonskid shoes/slippers when out of bed;supervised activity  -TA    Recorded by [TS] Gogo Hargrove V, PT Student [EH] Justa Guerra, PT [TA] Nate TIRADO  SANG Bhardwaj    Bed Mobility, Assessment/Treatment    Bed Mobility, Assistive Device (P)  bed rails;head of bed elevated;draw sheet  -TS bed rails;draw sheet  -EH bed rails;draw sheet  -TA    Bed Mobility, Roll Left, [place]  maximum assist (25% patient effort);2 person assist required  -EH maximum assist (25% patient effort);2 person assist required;verbal cues required  -TA    Bed Mobility, Roll Right, [place]  maximum assist (25% patient effort);2 person assist required  -EH maximum assist (25% patient effort);2 person assist required;verbal cues required  -TA    Bed Mobility, Scoot/Bridge, [place] (P)  dependent (less than 25% patient effort);2 person assist required  -TS dependent (less than 25% patient effort);2 person assist required  -EH dependent (less than 25% patient effort);2 person assist required  -TA    Bed Mob, Supine to Sit, [place] (P)  dependent (less than 25% patient effort);2 person assist required  -TS      Bed Mob, Sit to Supine, [place] (P)  dependent (less than 25% patient effort);2 person assist required  -TS      Bed Mobility, Safety Issues (P)  decreased use of arms for pushing/pulling;decreased use of legs for bridging/pushing;impaired trunk control for bed mobility  -TS      Bed Mobility, Impairments (P)  ROM decreased;strength decreased;pain  -TS      Bed Mobility, Comment (P)  pt dependent to sit up EOB.   -TS lifted to chair for sitting balance.  -EH Lift to chair for unsupported sitting activities  -TA    Recorded by [TS] Gogo Hargrove V PT Student [EH] Justa Guerra PT [TA] Nate Bhardwaj OT    Transfer Assessment/Treatment    Transfers, Bed-Chair [place] (P)  --   not attempted at this time  -TS dependent (less than 25% patient effort)  -EH dependent (less than 25% patient effort);2 person assist required  -TA    Transfers, Bed-Chair-Bed, Assist Device  mechanical lift  -EH mechanical lift  -TA    Transfer, Comment  pt unable to tolerate  attempt of sit to stand 2/2 R knee pain  - Unable to tolerate attempt d/t R knee pain  -TA    Recorded by [TS] Gogo Hargrove V, PT Student [EH] Justa Guerra, PT [TA] Nate Bhardwaj OT    Gait Assessment/Treatment    Gait, [place] Level (P)  unable to perform  -TS      Recorded by [TS] Gogo Hargrove V, PT Student      Lower Body Dressing Assessment/Training    LB Dressing Assess/Train, Clothing Type   donning:;slipper socks  -TA    LB Dressing Assess/Train, [place]   dependent (less than 25% patient effort)  -TA    Recorded by   [TA] Nate Bhardwaj OT    Grooming Assessment/Training    Grooming Assess/Train, Position   sitting;other (see comments)   unsupported sitting  -TA    Grooming Assess/Train, Indepen Level   verbal cues required;contact guard assist;set up required  -TA    Grooming Assess/Train, Impairments   strength decreased;impaired balance;decreased flexibility  -TA    Grooming Assess/Train, Comment   Pt able to sit at front of chair, with feet supported only and completed wash/dry face/neck/hands  -TA    Recorded by   [TA] Nate Bhardwaj OT    Motor Skills/Interventions    Additional Documentation   Balance Skills Training (Group)  -TA    Recorded by   [TA] Nate Bhardwaj OT    Balance Skills Training    Sitting-Level of Assistance (P)  Minimum assistance  -TS --   unsupported sitting, static, then with UE tasks by OT  - Contact guard  -TA    Sitting-Balance Support (P)  Feet supported;Right upper extremity supported  -TS Feet supported   1 min UE supported; 1 minute UE unsupported  - Feet supported  -TA    Sitting-Balance Activities (P)  Trunk control activities  -TS Lateral lean;Forward lean;Reaching for objects;Trunk control activities;Reaching across midline  - Forward lean;Lateral lean;Reaching for objects;Trunk control activities   tolerated 2 trials of 1 minute each trial  -TA    Sitting # of Minutes (P)  5  -TS 2   sitting rest break between two  one-minute bouts.   - 2  -TA    Recorded by [TS] Gogo Hargrove V, PT Student [EH] Justa Guerra, PT [TA] Nate Bhardwaj OT    Therapy Exercises    Bilateral Lower Extremities (P)  AAROM:;10 reps;supine;ankle pumps/circles;heel slides;hip IR;hip ER   Active assist with heel slides  -TS AROM:;10 reps;supine;ankle pumps/circles;hip ER;hip IR  -EH     Bilateral Upper Extremity   AROM:;10 reps;supine;elbow flexion/extension;hand pumps;shoulder extension/flexion;shoulder horizontal abd/add  -TA    Recorded by [TS] Gogo Hargrove V, PT Student [EH] Justa Guerra, PT [TA] Nate Bhardwaj OT    Positioning and Restraints    Pre-Treatment Position (P)  in bed  -TS in bed  -EH in bed  -TA    Post Treatment Position (P)  bed  -TS chair  -EH chair  -TA    In Bed (P)  supine;call light within reach;encouraged to call for assist  -TS      In Chair  notified nsg;reclined;call light within reach;encouraged to call for assist;waffle cushion;with other staff;on mechanical lift sling;legs elevated;with family/caregiver  -EH notified nsg;reclined;call light within reach;encouraged to call for assist;with other staff;waffle cushion;on mechanical lift sling;legs elevated  -TA    Recorded by [TS] Gogo Hargrove V, PT Student [EH] Justa Guerra, PT [TA] Nate Bhardwaj OT      User Key  (r) = Recorded By, (t) = Taken By, (c) = Cosigned By    Initials Name Effective Dates     Justa Guerra, PT 06/19/15 -     TA Nate Bhardwaj OT 03/14/16 -     TS Gogo Hargrove V, PT Student 07/31/17 -                 IP PT Goals       10/11/17 1549 10/09/17 1643 10/06/17 0933    Bed Mobility PT LTG    Bed Mobility PT LTG, Outcome (P)  goal ongoing  -TS goal ongoing  - goal ongoing  -UD    Transfer Training PT LTG    Transfer Training PT LTG, Outcome (P)  goal ongoing  -TS goal ongoing  - goal ongoing  -UD    Static Sitting Balance PT STG    Static Sitting Balance PT STG, Outcome (P)  goal met  -TS goal ongoing  -EH  goal ongoing  -UD    Dynamic Sitting Balance PT LTG    Dynamic Sitting Balance PT LTG, Outcome (P)  goal partially met  -TS goal ongoing  -EH goal ongoing  -UD    Static Standing Balance PT LTG    Static Standing Balance PT LTG, Outcome (P)  goal ongoing  -TS goal ongoing  -EH goal ongoing  -UD      10/05/17 1115          Bed Mobility PT LTG    Bed Mobility PT LTG, Date Established 10/05/17  -DM      Bed Mobility PT LTG, Time to Achieve 2 wks  -DM      Bed Mobility PT LTG, Activity Type all bed mobility  -DM      Bed Mobility PT LTG, [place] Level maximum assist (25% patient effort);2 person assist required  -DM      Transfer Training PT LTG    Transfer Training PT LTG, Date Established 10/05/17  -DM      Transfer Training PT LTG, Time to Achieve 2 wks  -DM      Transfer Training PT LTG, Activity Type sit to stand/stand to sit  -DM      Transfer Training PT LTG, [place] Level maximum assist (25% patient effort);2 person assist required   stable VS  -DM      Transfer Training PT LTG, Assist Device walker, rolling  -DM      Static Sitting Balance PT STG    Static Sitting Balance PT STG, Date Established 10/05/17  -DM      Static Sitting Balance PT STG, Time to Achieve 1 wk  -DM      Static Sitting Balance PT STG, [place] Level moderate assist (50% patient effort)  -DM      Dynamic Sitting Balance PT LTG    Dynamic Sitting Balance PT LTG, Date Established 10/05/17  -DM      Dynamic Sitting Balance PT LTG, Time to Achieve 2 wks  -DM      Dynamic Sitting Balance PT LTG, [place] Level moderate assist (50% patient effort);2 person assist required  -DM      Static Standing Balance PT LTG    Static Standing Balance PT LTG, Date Established 10/05/17  -DM      Static Standing Balance PT LTG, Time to Achieve 2 wks  -DM      Static Standing Balance PT LTG, [place] Level maximum assist (25% patient effort);2 person assist required  -DM      Static Standing Balance PT LTG, Assist Device assistive Device   -DM        User Key  (r) = Recorded By, (t) = Taken By, (c) = Cosigned By    Initials Name Provider Type    UD Yazmin Good, PTA Physical Therapy Assistant     Justa Guerra, PT Physical Therapist    CHRIS Pearce, PT Physical Therapist    TS Gogo Hargrove V, PT Student PT Student          Physical Therapy Education     Title: PT OT SLP Therapies (Active)     Topic: Physical Therapy (Active)     Point: Mobility training (Active)    Learning Progress Summary    Learner Readiness Method Response Comment Documented by Status   Patient Acceptance E NR Discussed POC with pt and safety issues with going home. Pt also educated in benefits to bed exercise.  10/11/17 1548 Active    Acceptance E NR   10/09/17 1643 Active    Acceptance E,D NR   10/06/17 0933 Active    Acceptance E,D NR  DM 10/05/17 1113 Active               Point: Home exercise program (Active)    Learning Progress Summary    Learner Readiness Method Response Comment Documented by Status   Patient Acceptance E NR Discussed POC with pt and safety issues with going home. Pt also educated in benefits to bed exercise.  10/11/17 1548 Active    Acceptance E NR   10/09/17 1643 Active    Acceptance E,D NR   10/06/17 0933 Active    Acceptance E,D NR  DM 10/05/17 1113 Active               Point: Body mechanics (Active)    Learning Progress Summary    Learner Readiness Method Response Comment Documented by Status   Patient Acceptance E NR Discussed POC with pt and safety issues with going home. Pt also educated in benefits to bed exercise.  10/11/17 1548 Active    Acceptance E NR   10/09/17 1643 Active    Acceptance E,D NR   10/06/17 0933 Active    Acceptance E,D NR  DM 10/05/17 1113 Active               Point: Precautions (Active)    Learning Progress Summary    Learner Readiness Method Response Comment Documented by Status   Patient Acceptance E NR Discussed POC with pt and safety issues with going home. Pt also educated in benefits to bed  exercise.  10/11/17 1548 Active    Acceptance E NR   10/09/17 1643 Active    Acceptance E,D NR  UD 10/06/17 0933 Active    Acceptance E,D NR   10/05/17 1113 Active                      User Key     Initials Effective Dates Name Provider Type Discipline     06/22/15 -  Yazmin Good, PTA Physical Therapy Assistant PT     06/19/15 -  Justa Guerra, PT Physical Therapist PT     06/19/15 -  Nancy Pearce, PT Physical Therapist PT     07/31/17 -  Gogo Hargrove V, PT Student PT Student PT                    PT Recommendation and Plan  Anticipated Discharge Disposition: placement for care  PT Frequency: daily  Plan of Care Review  Plan Of Care Reviewed With: (P) patient  Progress: (P) progress toward functional goals is gradual  Outcome Summary/Follow up Plan: (P) Pt is cooperative with PT and participates in sitting EOB. Pt still dependent with all bed mobility and t/f's not attempted.  Nursing home or 24/7 care recommended at this time, however pt verbalizes she plans to go home with help of a neighbor. Spoke with CM regarding concern for pt's unsafe d/c home.          Outcome Measures       10/11/17 1509 10/09/17 1525 10/09/17 1520    How much help from another person do you currently need...    Turning from your back to your side while in flat bed without using bedrails? (P)  1  -TS 1  -EH     Moving from lying on back to sitting on the side of a flat bed without bedrails? (P)  1  -TS 1  -EH     Moving to and from a bed to a chair (including a wheelchair)? (P)  1  -TS 1  -EH     Standing up from a chair using your arms (e.g., wheelchair, bedside chair)? (P)  1  -TS 1  -EH     Climbing 3-5 steps with a railing? (P)  1  -TS 1  -EH     To walk in hospital room? (P)  1  -TS 1  -EH     AM-PAC 6 Clicks Score (P)  6  -TS 6  -EH     How much help from another is currently needed...    Putting on and taking off regular lower body clothing?   1  -TA    Bathing (including washing, rinsing, and drying)   1  -TA     Toileting (which includes using toilet bed pan or urinal)   1  -TA    Putting on and taking off regular upper body clothing   2  -TA    Taking care of personal grooming (such as brushing teeth)   4  -TA    Eating meals   4  -TA    Score   13  -TA    Functional Assessment    Outcome Measure Options (P)  AM-PAC 6 Clicks Basic Mobility (PT)  -TS AM-PAC 6 Clicks Basic Mobility (PT)  - AM-PAC 6 Clicks Daily Activity (OT)  -TA      User Key  (r) = Recorded By, (t) = Taken By, (c) = Cosigned By    Initials Name Provider Type     Justa Guerra, PT Physical Therapist    TA Nate Bhardwaj, OT Occupational Therapist    TS Gogo Hargrove V, PT Student PT Student           Time Calculation:         PT Charges       10/11/17 1509          Time Calculation    Start Time (P)  1509  -TS      PT Received On (P)  10/11/17  -TS      PT Goal Re-Cert Due Date (P)  10/15/17  -TS      Time Calculation- PT    Total Timed Code Minutes- PT (P)  23 minute(s)  -TS        User Key  (r) = Recorded By, (t) = Taken By, (c) = Cosigned By    Initials Name Provider Type    TS Gogo Hargrove V, PT Student PT Student          Therapy Charges for Today     Code Description Service Date Service Provider Modifiers Qty    [id number] HC PT THER PROC EA 15 MIN 10/11/2017 Gogo Hargrove V, PT Student GP 2          PT G-Codes  Outcome Measure Options: (P) AM-PAC 6 Clicks Basic Mobility (PT)    Gogo Hargrove PT Student  10/11/2017

## 2017-10-12 VITALS
HEIGHT: 66 IN | TEMPERATURE: 98.4 F | OXYGEN SATURATION: 85 % | DIASTOLIC BLOOD PRESSURE: 78 MMHG | BODY MASS INDEX: 47.09 KG/M2 | SYSTOLIC BLOOD PRESSURE: 168 MMHG | RESPIRATION RATE: 20 BRPM | WEIGHT: 293 LBS | HEART RATE: 87 BPM

## 2017-10-12 PROBLEM — E87.6 HYPOKALEMIA: Status: RESOLVED | Noted: 2017-10-04 | Resolved: 2017-10-12

## 2017-10-12 PROBLEM — E83.42 HYPOMAGNESEMIA: Status: RESOLVED | Noted: 2017-10-04 | Resolved: 2017-10-12

## 2017-10-12 PROBLEM — D72.829 LEUKOCYTOSIS: Status: RESOLVED | Noted: 2017-09-02 | Resolved: 2017-10-12

## 2017-10-12 LAB
GLUCOSE BLDC GLUCOMTR-MCNC: 106 MG/DL (ref 70–130)
GLUCOSE BLDC GLUCOMTR-MCNC: 136 MG/DL (ref 70–130)
GLUCOSE BLDC GLUCOMTR-MCNC: 143 MG/DL (ref 70–130)

## 2017-10-12 PROCEDURE — 94799 UNLISTED PULMONARY SVC/PX: CPT

## 2017-10-12 PROCEDURE — 94640 AIRWAY INHALATION TREATMENT: CPT

## 2017-10-12 PROCEDURE — 25010000002 HEPARIN (PORCINE) PER 1000 UNITS: Performed by: NURSE PRACTITIONER

## 2017-10-12 PROCEDURE — 82962 GLUCOSE BLOOD TEST: CPT

## 2017-10-12 RX ORDER — CLONAZEPAM 0.5 MG/1
0.25 TABLET ORAL 2 TIMES DAILY PRN
Qty: 6 TABLET | Refills: 0 | Status: SHIPPED | OUTPATIENT
Start: 2017-10-12 | End: 2017-11-10 | Stop reason: HOSPADM

## 2017-10-12 RX ORDER — HYDROCODONE BITARTRATE AND ACETAMINOPHEN 5; 325 MG/1; MG/1
1 TABLET ORAL EVERY 4 HOURS PRN
Qty: 18 TABLET | Refills: 0 | Status: ON HOLD | OUTPATIENT
Start: 2017-10-12 | End: 2017-11-10

## 2017-10-12 RX ORDER — FAMOTIDINE 20 MG/1
20 TABLET, FILM COATED ORAL 2 TIMES DAILY
Status: DISCONTINUED | OUTPATIENT
Start: 2017-10-13 | End: 2017-10-12 | Stop reason: HOSPADM

## 2017-10-12 RX ORDER — SACCHAROMYCES BOULARDII 250 MG
250 CAPSULE ORAL 2 TIMES DAILY
Status: DISCONTINUED | OUTPATIENT
Start: 2017-10-12 | End: 2017-10-12 | Stop reason: HOSPADM

## 2017-10-12 RX ADMIN — AMLODIPINE BESYLATE 5 MG: 5 TABLET ORAL at 09:04

## 2017-10-12 RX ADMIN — BUDESONIDE AND FORMOTEROL FUMARATE DIHYDRATE 2 PUFF: 160; 4.5 AEROSOL RESPIRATORY (INHALATION) at 07:08

## 2017-10-12 RX ADMIN — Medication 250 MG: at 09:05

## 2017-10-12 RX ADMIN — CLONAZEPAM 0.25 MG: 0.5 TABLET ORAL at 09:05

## 2017-10-12 RX ADMIN — HYDRALAZINE HYDROCHLORIDE 75 MG: 25 TABLET ORAL at 16:46

## 2017-10-12 RX ADMIN — HYDROCODONE BITARTATE AND ACETAMINOPHEN 1 TABLET: 5; 325 TABLET ORAL at 09:04

## 2017-10-12 RX ADMIN — IPRATROPIUM BROMIDE AND ALBUTEROL SULFATE 3 ML: .5; 3 SOLUTION RESPIRATORY (INHALATION) at 07:07

## 2017-10-12 RX ADMIN — Medication 125 MG: at 17:02

## 2017-10-12 RX ADMIN — PANTOPRAZOLE SODIUM 40 MG: 40 TABLET, DELAYED RELEASE ORAL at 06:18

## 2017-10-12 RX ADMIN — HYDROCODONE BITARTATE AND ACETAMINOPHEN 1 TABLET: 5; 325 TABLET ORAL at 16:47

## 2017-10-12 RX ADMIN — Medication 125 MG: at 00:38

## 2017-10-12 RX ADMIN — HEPARIN SODIUM 5000 UNITS: 5000 INJECTION, SOLUTION INTRAVENOUS; SUBCUTANEOUS at 06:18

## 2017-10-12 RX ADMIN — HYDRALAZINE HYDROCHLORIDE 75 MG: 25 TABLET ORAL at 09:05

## 2017-10-12 RX ADMIN — ESCITALOPRAM OXALATE 10 MG: 10 TABLET ORAL at 09:05

## 2017-10-12 RX ADMIN — AMPICILLIN 500 MG: 500 CAPSULE ORAL at 00:35

## 2017-10-12 RX ADMIN — IPRATROPIUM BROMIDE AND ALBUTEROL SULFATE 3 ML: .5; 3 SOLUTION RESPIRATORY (INHALATION) at 13:00

## 2017-10-12 RX ADMIN — ISOSORBIDE MONONITRATE 60 MG: 60 TABLET, EXTENDED RELEASE ORAL at 09:05

## 2017-10-12 RX ADMIN — Medication 125 MG: at 06:18

## 2017-10-12 RX ADMIN — MULTIPLE VITAMINS W/ MINERALS TAB 1 TABLET: TAB ORAL at 09:05

## 2017-10-12 NOTE — PLAN OF CARE
Problem: Pressure Ulcer Risk (Saurav Scale) (Adult,Obstetrics,Pediatric)  Goal: Skin Integrity  Outcome: Ongoing (interventions implemented as appropriate)

## 2017-10-12 NOTE — PROGRESS NOTES
Continued Stay Note  [facility]     Patient Name: Joy Bueno  MRN: [id number]  Today's Date: 10/12/2017    Admit Date: 10/4/2017          Discharge Plan       10/12/17 1140    Case Management/Social Work Plan    Additional Comments Pt has once again been accepted to [facility] for today, 10/12/17. Ambulance has been arranged for 1800. Report can be called to [phone] and transfer summary with scripts can be faxed to [phone]. Pt is in agreement with plan.    Final Note    [facility] Note [facility]      10/12/17 1028    Case Management/Social Work Plan    Additional Comments Cm has spoken with pt who now reports she has changed her mind and believes she needs rehab prior to returning home. CM has contacted [facility] and requested they look at pt again. CM to follow.              Discharge Codes     None        Expected Discharge Date and Time     Expected Discharge Date Expected Discharge Time    Oct 11, 2017             Lisa Palacio RN
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
## 2017-10-12 NOTE — PROGRESS NOTES
"Continued Stay Note  [facility]     Patient Name: Joy Bueno  MRN: [id number]  Today's Date: 10/12/2017    Admit Date: 10/4/2017          Discharge Plan     Consent obtained yesterday for the participation in the [facility] Transitions Program although she stated \"I am going home.\" Ca Allred RN                Discharge Codes     None        Expected Discharge Date and Time     Expected Discharge Date Expected Discharge Time    Oct 11, 2017             Ca Allred RN    "

## 2017-10-12 NOTE — PROGRESS NOTES
Continued Stay Note  [facility]     Patient Name: Joy Bueno  MRN: [id number]  Today's Date: 10/12/2017    Admit Date: 10/4/2017          Discharge Plan       10/12/17 1028    Case Management/Social Work Plan    Additional Comments Cm has spoken with pt who now reports she has changed her mind and believes she needs rehab prior to returning home. CM has contacted [facility] and requested they look at pt again. CM to follow.              Discharge Codes     None        Expected Discharge Date and Time     Expected Discharge Date Expected Discharge Time    Oct 11, 2017             Lisa Palacio RN

## 2017-10-12 NOTE — PROGRESS NOTES
[facility] Medicine Services  DISCHARGE SUMMARY       Date of Admission: 10/4/2017  Date of Discharge:  10/12/2017  Primary Care Physician: Trevon Delarosa MD  Consulting Physician(s)             None            Discharge Diagnoses:  Active Hospital Problems (** Indicates Principal Problem)    Diagnosis Date Noted   • **Generalized weakness [R53.1] 10/09/2017   • Clostridium difficile diarrhea [A04.72] 10/04/2017   • Urinary tract infection associated with indwelling urethral catheter [T83.511A, N39.0] 10/04/2017   • Bilateral leg pain [M79.604, M79.605] 10/04/2017   • CKD (chronic kidney disease) Stage 3 [N18.9] 08/28/2017     Followed by nephrology associates ([facility])     • Anemia [D64.9] 08/23/2017     Iron deficient     • Asthma [J45.909] 08/03/2017   • Diabetes mellitus [E11.9] 08/03/2017   • Hypertension [I10] 08/03/2017   • Anxiety [F41.9]    • Morbid obesity [E66.01]       Resolved Hospital Problems    Diagnosis Date Noted Date Resolved   • Hypokalemia [E87.6] 10/04/2017 10/12/2017   • Hypomagnesemia [E83.42] 10/04/2017 10/12/2017   • Leukocytosis [D72.829] 09/02/2017 10/12/2017       Presenting Problem/History of Present Illness  Urinary tract infection associated with indwelling urethral catheter, initial encounter [T83.511A, N39.0]     Chief Complaint on Day of Discharge:   Hospital follow up for UTI    History of Present Illness on Day of Discharge:   Didn't sleep well last night, worried about dc plans  Large diarrhea BM this am    Hospital Course  Patient is a 67 y.o. -American female with a past medical history significant for hypertension, diabetes, osteoarthritis, asthma, renal failure, and anxiety.  The patient has had 3 recent hospitalizations in August.  She was admitted from 8/8 to 8/12/2017 with renal failure status post renal biopsy.  She was discharged to rehabilitation and continued to follow-up with nephrology and colonoscopy was offered during that  hospitalization as further workup for her anemia which was declined by the patient.  She was also treated with Rocephin for an Escherichia coli UTI.  On 8/23 she was readmitted to [facility] from rehabilitation with increased weakness and stayed until 8/30/2017.  During that hospitalization, the patient continued to have anemia requiring blood transfusion as well as iron transfusion.  The patient consented to an EGD on 8/25/2017 which showed reactive gastropathy with minimal chronic gastritis.  She also had a colonoscopy on 8/28/2017 that showed scattered diverticula and grade 1 internal hemorrhoids.  She was discharged to [facility] from that hospitalization for rehabilitation.  She was then readmitted from September 2 to September 7, 2017 with a right middle lobe/right lower lobe pneumonia and a right pleural effusion with acute on chronic respiratory failure and hypoxia.  She was given Levaquin therapy and was discharged back to [facility] on oral antibiotics.  The patient was released approximately one week prior to her presentation back at [facility].  She reported that during her week home she became progressively weak with increasing lower extremity pain and edema.  Evaluation in the emergency department revealed a urinalysis significant for urinary tract infection.  Her chest x-ray was stable from her last admission she was noted to have a hemoglobin of 7.8 and a hematocrit of 24.7.  The patient was admitted to Hospital medicine services for further evaluation and treatment.  She was also noted to have an electrolyte derangement and did have electrolyte replacement.  The patient also complained of diarrhea during this hospitalization.  She has been on several antibiotics over the past 3 months.  Stool for C. difficile PCR did come back positive and she was started on oral vancomycin 4 times daily.  She complained of increasing fatigue and weakness as well as bilateral leg  pain.  A bilateral venous duplex and a V/Q scan were both performed to check for DVT/PE which were negative.  Her fatigue is likely related to her urinary tract infection.  Her renal function is now back to her baseline creatinine of about 2.  The patient's hemoglobin remained stable.  The patient had been treated for urinary tract infection with vancomycin and Zosyn when she was admitted through the emergency department.  Her urine culture grew out 40,000-50,000 CFU per mL of enterococcus faecalis and she was switched to oral ampicillin ×3 days.  She will receive her last dose of ampicillin prior to her discharge home  The patient was seen by physical therapy as well as occupational therapy during this hospitalization.  Mobility has been a big issue for her.  She is agreeable to skilled nursing for rehab but she has very few skilled days left.  She is unwilling to be placed in a long term bed and she refuses to go to a facility as a medicaid pending, with the possibility of being long term.  She would rather go home and take her chances.  She has been informed of the risks of going home alone in her current weakened state but she is adamant that she would rather go home than to a long term bed.  We will order a hospital bed for the patient due to her chronic debilitated state.  She requires the use of a hospital bed due to being unable to reposition in bed.  She also has diabetes and chronic leg pain and requires leg elevation.         Please note that patient was stable for dc home with  on 10.11.17, but when ambulance arrived she could not confirm that there would be anyone at her home to help, therefore dc was cancelled.  She has had no changes overnight and is now agreeable for transfer to [place] on 10.12.17    Procedures Performed      Consults:   Consults     No orders found from 9/5/2017 to 10/5/2017.          Pertinent Test Results:  No new from dc summary done on previous date    Condition on  "Discharge:  stable    Physical Exam on Discharge:/78  Pulse 87  Temp 98.4 °F (36.9 °C) (Oral)   Resp 20  Ht 66\" (167.6 cm)  Wt (!) 336 lb 6 oz (153 kg)  SpO2 (!) 85%  BMI 54.29 kg/m2  Physical Exam   Constitutional: She is oriented to person, place, and time. She appears well-developed and well-nourished. No distress.   HENT:   Head: Normocephalic.   Neck: Neck supple.   Cardiovascular: Normal rate.  Exam reveals no gallop and no friction rub.    No murmur heard.  Pulmonary/Chest: Effort normal. No respiratory distress.   Abdominal: Soft. Bowel sounds are normal. There is no tenderness.   obese   Musculoskeletal: She exhibits edema.   Neurological: She is alert and oriented to person, place, and time.   Skin: Skin is warm and dry.   Psychiatric: She has a normal mood and affect. Her behavior is normal.   Vitals reviewed.        Discharge Disposition  Rehab Facility or Unit (DC - External)    Discharge Medications   Joy Bueno   Home Medication Instructions JESSICA:[id number]    Printed on:10/12/17 1604   Medication Information                      acetaminophen (TYLENOL) 325 MG tablet  Take 650 mg by mouth Every 4 (Four) Hours As Needed for Mild Pain (1-3).             albuterol (PROVENTIL HFA;VENTOLIN HFA) 108 (90 BASE) MCG/ACT inhaler  Inhale 2 puffs Every 4 (Four) Hours As Needed for Wheezing.             amLODIPine (NORVASC) 5 MG tablet  Take 1 tablet by mouth Daily.             atorvastatin (LIPITOR) 10 MG tablet  Take 10 mg by mouth Every Night.             Cholecalciferol (VITAMIN D3) 17265 units tablet  Take 50,000 Units by mouth Every 7 (Seven) Days.             clonazePAM (KlonoPIN) 0.5 MG tablet  Take 0.5 tablets by mouth 2 (Two) Times a Day As Needed for Seizures.             escitalopram (LEXAPRO) 10 MG tablet  Take 1 tablet by mouth Daily.             furosemide (LASIX) 40 MG tablet  Take 40 mg by mouth 2 (Two) Times a Day.             hydrALAZINE (APRESOLINE) 50 MG tablet  Take 1.5 " tablets by mouth 3 (Three) Times a Day.             HYDROcodone-acetaminophen (NORCO) 5-325 MG per tablet  Take 1 tablet by mouth Every 4 (Four) Hours As Needed for Moderate Pain .             insulin lispro (humaLOG) 100 UNIT/ML injection  Inject 0-7 Units under the skin 4 (Four) Times a Day Before Meals & at Bedtime.             isosorbide mononitrate (IMDUR) 60 MG 24 hr tablet  Take 1 tablet by mouth Daily.             mometasone-formoterol (DULERA 200) 200-5 MCG/ACT inhaler  Inhale 2 puffs 2 (Two) Times a Day.             Multiple Vitamins-Minerals (MULTIVITAMIN ADULTS PO)  Take 1 tablet by mouth Daily.             phenylephrine-shark liver oil-mineral oil-petrolatum (PREPARATION H) 0.25-3-14-71.9 % rectal ointment  Insert  into the rectum 3 (Three) Times a Day As Needed for Hemorrhoids.             saccharomyces boulardii (FLORASTOR) 250 MG capsule  Take 1 capsule by mouth 2 (Two) Times a Day.             simethicone (MYLICON) 80 MG chewable tablet  Chew 80 mg Every 6 (Six) Hours As Needed for Flatulence.             vancomycin 50 MG/ML solution oral solution  Take 2.5 mL by mouth Every 6 (Six) Hours for 7 days. Indications: Clostridium Difficile Infection                 Discharge Diet:   Diet Instructions     Diet: Cardiac, Consistent Carbohydrate; Thin       Discharge Diet:   Cardiac  Consistent Carbohydrate      Fluid Consistency:  Thin                 Discharge Care Plan / Instructions:    Activity at Discharge:   Activity Instructions     Activity as Tolerated                     Follow-up Appointments  No future appointments.      Test Results Pending at Discharge       SUELLEN Aponte 10/12/17 4:01 PM    Time: Discharge 25 min    Please note that portions of this note may have been completed with a voice recognition program. Efforts were made to edit the dictations, but occasionally words are mistranscribed.

## 2017-10-12 NOTE — PLAN OF CARE
Problem: Patient Care Overview (Adult)  Goal: Plan of Care Review  Outcome: Ongoing (interventions implemented as appropriate)    10/12/17 1640   Coping/Psychosocial Response Interventions   Plan Of Care Reviewed With patient   Patient Care Overview   Progress no change   Outcome Evaluation   Outcome Summary/Follow up Plan Participates in care. eager to go to [place] at 1800         Problem: Pain, Acute (Adult)  Goal: Acceptable Pain Control/Comfort Level  Outcome: Ongoing (interventions implemented as appropriate)    Problem: Fall Risk (Adult)  Goal: Absence of Falls  Outcome: Ongoing (interventions implemented as appropriate)    Problem: Infection, Risk/Actual (Adult)  Goal: Infection Prevention/Resolution  Outcome: Ongoing (interventions implemented as appropriate)

## 2017-10-17 LAB
CYTO UR: NORMAL
LAB AP CASE REPORT: NORMAL
LAB AP CLINICAL INFORMATION: NORMAL
Lab: NORMAL
PATH REPORT.ADDENDUM SPEC: NORMAL
PATH REPORT.FINAL DX SPEC: NORMAL
PATH REPORT.GROSS SPEC: NORMAL

## 2017-11-03 ENCOUNTER — HOSPITAL ENCOUNTER (INPATIENT)
Facility: HOSPITAL | Age: 67
LOS: 3 days | Discharge: HOME | End: 2017-11-10
Attending: EMERGENCY MEDICINE | Admitting: INTERNAL MEDICINE

## 2017-11-03 DIAGNOSIS — R53.81 PHYSICAL DECONDITIONING: ICD-10-CM

## 2017-11-03 DIAGNOSIS — R53.1 GENERALIZED WEAKNESS: ICD-10-CM

## 2017-11-03 DIAGNOSIS — E66.01 MORBID OBESITY (HCC): ICD-10-CM

## 2017-11-03 DIAGNOSIS — Z78.9 IMPAIRED MOBILITY AND ADLS: ICD-10-CM

## 2017-11-03 DIAGNOSIS — R10.9 ABDOMINAL PAIN, UNSPECIFIED ABDOMINAL LOCATION: Primary | ICD-10-CM

## 2017-11-03 DIAGNOSIS — Z74.09 IMPAIRED FUNCTIONAL MOBILITY, BALANCE, GAIT, AND ENDURANCE: ICD-10-CM

## 2017-11-03 DIAGNOSIS — Z74.09 IMPAIRED MOBILITY AND ADLS: ICD-10-CM

## 2017-11-03 DIAGNOSIS — R19.7 DIARRHEA, UNSPECIFIED TYPE: ICD-10-CM

## 2017-11-03 DIAGNOSIS — F41.9 ANXIETY: ICD-10-CM

## 2017-11-03 DIAGNOSIS — I50.9 CHRONIC HEART FAILURE, UNSPECIFIED HEART FAILURE TYPE (HCC): Chronic | ICD-10-CM

## 2017-11-03 PROCEDURE — 99285 EMERGENCY DEPT VISIT HI MDM: CPT

## 2017-11-03 RX ORDER — SODIUM CHLORIDE 0.9 % (FLUSH) 0.9 %
10 SYRINGE (ML) INJECTION AS NEEDED
Status: DISCONTINUED | OUTPATIENT
Start: 2017-11-03 | End: 2017-11-10 | Stop reason: HOSPADM

## 2017-11-04 ENCOUNTER — APPOINTMENT (OUTPATIENT)
Dept: CT IMAGING | Facility: HOSPITAL | Age: 67
End: 2017-11-04

## 2017-11-04 PROBLEM — E11.9 DIABETES MELLITUS TYPE 2, INSULIN DEPENDENT (HCC): Chronic | Status: ACTIVE | Noted: 2017-11-04

## 2017-11-04 PROBLEM — R10.9 ABDOMINAL PAIN: Status: ACTIVE | Noted: 2017-11-04

## 2017-11-04 PROBLEM — Z79.4 DIABETES MELLITUS TYPE 2, INSULIN DEPENDENT (HCC): Chronic | Status: ACTIVE | Noted: 2017-11-04

## 2017-11-04 PROBLEM — I50.9 CHRONIC HEART FAILURE (HCC): Chronic | Status: ACTIVE | Noted: 2017-11-04

## 2017-11-04 LAB
ALBUMIN SERPL-MCNC: 3.5 G/DL (ref 3.2–4.8)
ALBUMIN/GLOB SERPL: 0.9 G/DL (ref 1.5–2.5)
ALP SERPL-CCNC: 100 U/L (ref 25–100)
ALT SERPL W P-5'-P-CCNC: 13 U/L (ref 7–40)
ANION GAP SERPL CALCULATED.3IONS-SCNC: 8 MMOL/L (ref 3–11)
AST SERPL-CCNC: 16 U/L (ref 0–33)
BACTERIA UR QL AUTO: ABNORMAL /HPF
BASOPHILS # BLD AUTO: 0.02 10*3/MM3 (ref 0–0.2)
BASOPHILS NFR BLD AUTO: 0.2 % (ref 0–1)
BILIRUB SERPL-MCNC: 0.5 MG/DL (ref 0.3–1.2)
BILIRUB UR QL STRIP: NEGATIVE
BUN BLD-MCNC: 41 MG/DL (ref 9–23)
BUN/CREAT SERPL: 19.5 (ref 7–25)
C DIFF TOX GENS STL QL NAA+PROBE: NOT DETECTED
CALCIUM SPEC-SCNC: 8.7 MG/DL (ref 8.7–10.4)
CHLORIDE SERPL-SCNC: 108 MMOL/L (ref 99–109)
CLARITY UR: ABNORMAL
CO2 SERPL-SCNC: 29 MMOL/L (ref 20–31)
COLOR UR: YELLOW
CREAT BLD-MCNC: 2.1 MG/DL (ref 0.6–1.3)
DEPRECATED RDW RBC AUTO: 56.5 FL (ref 37–54)
EOSINOPHIL # BLD AUTO: 0.39 10*3/MM3 (ref 0–0.3)
EOSINOPHIL NFR BLD AUTO: 3.1 % (ref 0–3)
ERYTHROCYTE [DISTWIDTH] IN BLOOD BY AUTOMATED COUNT: 16.6 % (ref 11.3–14.5)
GFR SERPL CREATININE-BSD FRML MDRD: 29 ML/MIN/1.73
GLOBULIN UR ELPH-MCNC: 3.7 GM/DL
GLUCOSE BLD-MCNC: 152 MG/DL (ref 70–100)
GLUCOSE BLDC GLUCOMTR-MCNC: 110 MG/DL (ref 70–130)
GLUCOSE BLDC GLUCOMTR-MCNC: 176 MG/DL (ref 70–130)
GLUCOSE UR STRIP-MCNC: NEGATIVE MG/DL
HCT VFR BLD AUTO: 31.4 % (ref 34.5–44)
HGB BLD-MCNC: 9.7 G/DL (ref 11.5–15.5)
HGB UR QL STRIP.AUTO: ABNORMAL
HOLD SPECIMEN: NORMAL
HYALINE CASTS UR QL AUTO: ABNORMAL /LPF
IMM GRANULOCYTES # BLD: 0.06 10*3/MM3 (ref 0–0.03)
IMM GRANULOCYTES NFR BLD: 0.5 % (ref 0–0.6)
KETONES UR QL STRIP: NEGATIVE
LEUKOCYTE ESTERASE UR QL STRIP.AUTO: NEGATIVE
LIPASE SERPL-CCNC: 49 U/L (ref 6–51)
LYMPHOCYTES # BLD AUTO: 0.96 10*3/MM3 (ref 0.6–4.8)
LYMPHOCYTES NFR BLD AUTO: 7.7 % (ref 24–44)
MCH RBC QN AUTO: 28.7 PG (ref 27–31)
MCHC RBC AUTO-ENTMCNC: 30.9 G/DL (ref 32–36)
MCV RBC AUTO: 92.9 FL (ref 80–99)
MONOCYTES # BLD AUTO: 0.38 10*3/MM3 (ref 0–1)
MONOCYTES NFR BLD AUTO: 3.1 % (ref 0–12)
NEUTROPHILS # BLD AUTO: 10.64 10*3/MM3 (ref 1.5–8.3)
NEUTROPHILS NFR BLD AUTO: 85.4 % (ref 41–71)
NITRITE UR QL STRIP: NEGATIVE
PH UR STRIP.AUTO: 5.5 [PH] (ref 5–8)
PLATELET # BLD AUTO: 277 10*3/MM3 (ref 150–450)
PMV BLD AUTO: 9.6 FL (ref 6–12)
POTASSIUM BLD-SCNC: 3.2 MMOL/L (ref 3.5–5.5)
PROT SERPL-MCNC: 7.2 G/DL (ref 5.7–8.2)
PROT UR QL STRIP: ABNORMAL
RBC # BLD AUTO: 3.38 10*6/MM3 (ref 3.89–5.14)
RBC # UR: ABNORMAL /HPF
REF LAB TEST METHOD: ABNORMAL
SODIUM BLD-SCNC: 145 MMOL/L (ref 132–146)
SP GR UR STRIP: 1.01 (ref 1–1.03)
SQUAMOUS #/AREA URNS HPF: ABNORMAL /HPF
UROBILINOGEN UR QL STRIP: ABNORMAL
WBC NRBC COR # BLD: 12.45 10*3/MM3 (ref 3.5–10.8)
WBC UR QL AUTO: ABNORMAL /HPF
WHOLE BLOOD HOLD SPECIMEN: NORMAL
WHOLE BLOOD HOLD SPECIMEN: NORMAL

## 2017-11-04 PROCEDURE — G0378 HOSPITAL OBSERVATION PER HR: HCPCS

## 2017-11-04 PROCEDURE — 81001 URINALYSIS AUTO W/SCOPE: CPT | Performed by: EMERGENCY MEDICINE

## 2017-11-04 PROCEDURE — 87493 C DIFF AMPLIFIED PROBE: CPT | Performed by: EMERGENCY MEDICINE

## 2017-11-04 PROCEDURE — 71250 CT THORAX DX C-: CPT

## 2017-11-04 PROCEDURE — 94640 AIRWAY INHALATION TREATMENT: CPT

## 2017-11-04 PROCEDURE — 99220 PR INITIAL OBSERVATION CARE/DAY 70 MINUTES: CPT | Performed by: INTERNAL MEDICINE

## 2017-11-04 PROCEDURE — 25010000002 HEPARIN (PORCINE) PER 1000 UNITS: Performed by: NURSE PRACTITIONER

## 2017-11-04 PROCEDURE — 74176 CT ABD & PELVIS W/O CONTRAST: CPT

## 2017-11-04 PROCEDURE — 94799 UNLISTED PULMONARY SVC/PX: CPT

## 2017-11-04 PROCEDURE — 85025 COMPLETE CBC W/AUTO DIFF WBC: CPT | Performed by: EMERGENCY MEDICINE

## 2017-11-04 PROCEDURE — 63710000001 INSULIN LISPRO (HUMAN) PER 5 UNITS: Performed by: NURSE PRACTITIONER

## 2017-11-04 PROCEDURE — 83690 ASSAY OF LIPASE: CPT | Performed by: EMERGENCY MEDICINE

## 2017-11-04 PROCEDURE — 96372 THER/PROPH/DIAG INJ SC/IM: CPT

## 2017-11-04 PROCEDURE — 80053 COMPREHEN METABOLIC PANEL: CPT | Performed by: EMERGENCY MEDICINE

## 2017-11-04 PROCEDURE — 82962 GLUCOSE BLOOD TEST: CPT

## 2017-11-04 PROCEDURE — 87086 URINE CULTURE/COLONY COUNT: CPT | Performed by: EMERGENCY MEDICINE

## 2017-11-04 RX ORDER — BUDESONIDE AND FORMOTEROL FUMARATE DIHYDRATE 160; 4.5 UG/1; UG/1
2 AEROSOL RESPIRATORY (INHALATION)
Status: DISCONTINUED | OUTPATIENT
Start: 2017-11-04 | End: 2017-11-10 | Stop reason: HOSPADM

## 2017-11-04 RX ORDER — ESCITALOPRAM OXALATE 10 MG/1
10 TABLET ORAL DAILY
Status: DISCONTINUED | OUTPATIENT
Start: 2017-11-04 | End: 2017-11-10 | Stop reason: HOSPADM

## 2017-11-04 RX ORDER — NICOTINE POLACRILEX 4 MG
15 LOZENGE BUCCAL
Status: DISCONTINUED | OUTPATIENT
Start: 2017-11-04 | End: 2017-11-10 | Stop reason: HOSPADM

## 2017-11-04 RX ORDER — HYDROCODONE BITARTRATE AND ACETAMINOPHEN 5; 325 MG/1; MG/1
1 TABLET ORAL EVERY 4 HOURS PRN
Status: DISCONTINUED | OUTPATIENT
Start: 2017-11-04 | End: 2017-11-10 | Stop reason: HOSPADM

## 2017-11-04 RX ORDER — IPRATROPIUM BROMIDE AND ALBUTEROL SULFATE 2.5; .5 MG/3ML; MG/3ML
3 SOLUTION RESPIRATORY (INHALATION) EVERY 4 HOURS PRN
Status: DISCONTINUED | OUTPATIENT
Start: 2017-11-04 | End: 2017-11-10 | Stop reason: HOSPADM

## 2017-11-04 RX ORDER — HEPARIN SODIUM 5000 [USP'U]/ML
5000 INJECTION, SOLUTION INTRAVENOUS; SUBCUTANEOUS EVERY 12 HOURS SCHEDULED
Status: DISCONTINUED | OUTPATIENT
Start: 2017-11-04 | End: 2017-11-10 | Stop reason: HOSPADM

## 2017-11-04 RX ORDER — HYDRALAZINE HYDROCHLORIDE 20 MG/ML
20 INJECTION INTRAMUSCULAR; INTRAVENOUS EVERY 6 HOURS PRN
Status: DISCONTINUED | OUTPATIENT
Start: 2017-11-04 | End: 2017-11-10 | Stop reason: HOSPADM

## 2017-11-04 RX ORDER — ACETAMINOPHEN 325 MG/1
650 TABLET ORAL EVERY 4 HOURS PRN
Status: DISCONTINUED | OUTPATIENT
Start: 2017-11-04 | End: 2017-11-10 | Stop reason: HOSPADM

## 2017-11-04 RX ORDER — SIMETHICONE 80 MG
80 TABLET,CHEWABLE ORAL EVERY 6 HOURS PRN
Status: DISCONTINUED | OUTPATIENT
Start: 2017-11-04 | End: 2017-11-10 | Stop reason: HOSPADM

## 2017-11-04 RX ORDER — ATORVASTATIN CALCIUM 10 MG/1
10 TABLET, FILM COATED ORAL NIGHTLY
Status: DISCONTINUED | OUTPATIENT
Start: 2017-11-04 | End: 2017-11-10 | Stop reason: HOSPADM

## 2017-11-04 RX ORDER — ISOSORBIDE MONONITRATE 60 MG/1
60 TABLET, EXTENDED RELEASE ORAL DAILY
Status: DISCONTINUED | OUTPATIENT
Start: 2017-11-04 | End: 2017-11-10 | Stop reason: HOSPADM

## 2017-11-04 RX ORDER — SACCHAROMYCES BOULARDII 250 MG
250 CAPSULE ORAL 2 TIMES DAILY
Status: DISCONTINUED | OUTPATIENT
Start: 2017-11-04 | End: 2017-11-10 | Stop reason: HOSPADM

## 2017-11-04 RX ORDER — SODIUM CHLORIDE 0.9 % (FLUSH) 0.9 %
1-10 SYRINGE (ML) INJECTION AS NEEDED
Status: DISCONTINUED | OUTPATIENT
Start: 2017-11-04 | End: 2017-11-10 | Stop reason: HOSPADM

## 2017-11-04 RX ORDER — DEXTROSE MONOHYDRATE 25 G/50ML
25 INJECTION, SOLUTION INTRAVENOUS
Status: DISCONTINUED | OUTPATIENT
Start: 2017-11-04 | End: 2017-11-10 | Stop reason: HOSPADM

## 2017-11-04 RX ORDER — CASTOR OIL AND BALSAM, PERU 788; 87 MG/G; MG/G
OINTMENT TOPICAL EVERY 12 HOURS SCHEDULED
Status: DISCONTINUED | OUTPATIENT
Start: 2017-11-04 | End: 2017-11-10 | Stop reason: HOSPADM

## 2017-11-04 RX ORDER — CALCIUM CARBONATE 200(500)MG
2 TABLET,CHEWABLE ORAL 2 TIMES DAILY PRN
Status: DISCONTINUED | OUTPATIENT
Start: 2017-11-04 | End: 2017-11-06

## 2017-11-04 RX ORDER — AMLODIPINE BESYLATE 5 MG/1
5 TABLET ORAL DAILY
Status: DISCONTINUED | OUTPATIENT
Start: 2017-11-04 | End: 2017-11-05

## 2017-11-04 RX ORDER — CLONAZEPAM 0.5 MG/1
0.25 TABLET ORAL 2 TIMES DAILY PRN
Status: DISCONTINUED | OUTPATIENT
Start: 2017-11-04 | End: 2017-11-10 | Stop reason: HOSPADM

## 2017-11-04 RX ORDER — FUROSEMIDE 40 MG/1
40 TABLET ORAL 2 TIMES DAILY
Status: DISCONTINUED | OUTPATIENT
Start: 2017-11-04 | End: 2017-11-10 | Stop reason: HOSPADM

## 2017-11-04 RX ORDER — MULTIPLE VITAMINS W/ MINERALS TAB 9MG-400MCG
1 TAB ORAL DAILY
Status: DISCONTINUED | OUTPATIENT
Start: 2017-11-04 | End: 2017-11-10 | Stop reason: HOSPADM

## 2017-11-04 RX ADMIN — HEPARIN SODIUM 5000 UNITS: 5000 INJECTION, SOLUTION INTRAVENOUS; SUBCUTANEOUS at 11:54

## 2017-11-04 RX ADMIN — MICONAZOLE NITRATE: 20 POWDER TOPICAL at 21:26

## 2017-11-04 RX ADMIN — ATORVASTATIN CALCIUM 10 MG: 10 TABLET, FILM COATED ORAL at 21:13

## 2017-11-04 RX ADMIN — HYDRALAZINE HYDROCHLORIDE 75 MG: 25 TABLET ORAL at 18:00

## 2017-11-04 RX ADMIN — HYDROCODONE BITARTATE AND ACETAMINOPHEN 1 TABLET: 5; 325 TABLET ORAL at 21:13

## 2017-11-04 RX ADMIN — Medication 250 MG: at 18:01

## 2017-11-04 RX ADMIN — FUROSEMIDE 40 MG: 40 TABLET ORAL at 18:01

## 2017-11-04 RX ADMIN — Medication 250 MG: at 11:54

## 2017-11-04 RX ADMIN — MULTIPLE VITAMINS W/ MINERALS TAB 1 TABLET: TAB ORAL at 11:53

## 2017-11-04 RX ADMIN — BUDESONIDE AND FORMOTEROL FUMARATE DIHYDRATE 2 PUFF: 160; 4.5 AEROSOL RESPIRATORY (INHALATION) at 20:43

## 2017-11-04 RX ADMIN — IPRATROPIUM BROMIDE AND ALBUTEROL SULFATE 3 ML: .5; 3 SOLUTION RESPIRATORY (INHALATION) at 20:43

## 2017-11-04 RX ADMIN — HYDRALAZINE HYDROCHLORIDE 75 MG: 25 TABLET ORAL at 11:52

## 2017-11-04 RX ADMIN — HEPARIN SODIUM 5000 UNITS: 5000 INJECTION, SOLUTION INTRAVENOUS; SUBCUTANEOUS at 21:13

## 2017-11-04 RX ADMIN — AMLODIPINE BESYLATE 5 MG: 5 TABLET ORAL at 11:50

## 2017-11-04 RX ADMIN — HYDRALAZINE HYDROCHLORIDE 75 MG: 25 TABLET ORAL at 21:13

## 2017-11-04 RX ADMIN — FUROSEMIDE 40 MG: 40 TABLET ORAL at 11:51

## 2017-11-04 RX ADMIN — ESCITALOPRAM OXALATE 10 MG: 10 TABLET ORAL at 11:52

## 2017-11-04 RX ADMIN — CASTOR OIL AND BALSAM, PERU: 788; 87 OINTMENT TOPICAL at 21:25

## 2017-11-04 RX ADMIN — BUDESONIDE AND FORMOTEROL FUMARATE DIHYDRATE 2 PUFF: 160; 4.5 AEROSOL RESPIRATORY (INHALATION) at 12:25

## 2017-11-04 RX ADMIN — ISOSORBIDE MONONITRATE 60 MG: 60 TABLET, EXTENDED RELEASE ORAL at 11:51

## 2017-11-04 NOTE — H&P
[facility] Medicine Services  HISTORY AND PHYSICAL    Patient Name: Joy Bueno  : 1950  MRN: [id number]  Primary Care Physician: Trevon Delarosa MD    Subjective   Subjective     Chief Complaint:  Abdominal pain    HPI:  Joy Bueno is a 67 y.o. female who was recently admitted to the hospital for evaluation of urinary tract infection.  Patient was discharged for rehabilitation Center the day, reports developing abdominal pain in the lower anterior abdomen.  Upon presentation here she continues to have the pain.  Patient had a large bowel movement prior to be evaluated the patient, she reports that led to complete resolution of pain.  She denies chest pain or difficult breathing.  No fever.  Stool is loose in appearance with heidi consistency. No watery diarrhea has been present.  No other acute complaints.  Upon further discussion with the patient about her social situation it seems the patient does not have good social support. I am concerned she is unable to walk and perform activities of daily living, and primary family member, daughter, admits she is not going to be able to help with these activities either.   She was hospitalized here from 10/4/17 to 10/11/17 due to UTI related to mendieta cath and D Diff. She was DC'd to rehab after refusing to go to a SNF. She just finished the allowed number of rehab days and although she still required significant assistance to get out of bed and tend to her ADL's she went home.   She has had a couple of bowel movements since arrival, the second one being much looser. C diff is pending which was ordered by ED. She did complete treatment.  Otherwise she does not have any new findings on her labs. Her CKD appears stable with a creatinine of 2.1.  Given her mobility issues and no help at home along with the abdominal pain. She has been referred for admission with hospital medicine      Review of Systems   Constitutional: Positive for  "activity change. Negative for chills, diaphoresis and unexpected weight change.   HENT: Negative.    Eyes: Negative.    Respiratory: Positive for cough and shortness of breath.    Cardiovascular: Positive for leg swelling. Negative for chest pain and palpitations.   Gastrointestinal: Positive for abdominal pain, constipation and diarrhea. Negative for blood in stool and vomiting.   Endocrine: Negative.    Genitourinary: Positive for difficulty urinating.   Musculoskeletal: Positive for arthralgias and gait problem.       (consider inserting and editing \".LEXROS\" if pertinent positives and negatives are not already listed in HPI)    Otherwise 10-system ROS reviewed and is negative except as mentioned in the HPI.    Personal History     Past Medical History:   Diagnosis Date   • Anemia    • Anxiety    • Asthma    • Diabetes mellitus    • Hypertension    • Morbid obesity    • Osteoarthritis        Past Surgical History:   Procedure Laterality Date   •  SECTION     • COLONOSCOPY N/A 2017    Procedure: COLONOSCOPY;  Surgeon: Mark I Brunner, MD;  Location:  [facility];  Service:    • ENDOSCOPY N/A 2017    Procedure: ESOPHAGOGASTRODUODENOSCOPY ;  Surgeon: Velasquez Call MD;  Location:  [facility];  Service:    • HERNIA REPAIR         Family History: family history is not on file.     Social History:  reports that she has quit smoking. She does not have any smokeless tobacco history on file. She reports that she does not drink alcohol or use illicit drugs.    Medications:    (Not in a hospital admission)    No Known Allergies    Objective   Objective     Vital Signs:   Temp:  [98.5 °F (36.9 °C)] 98.5 °F (36.9 °C)  Heart Rate:  [92] 92  Resp:  [18] 18  BP: (202)/(103) 202/103        Physical Exam   Constitutional: She is oriented to person, place, and time.   Morbidly obese woman laying in bed. Difficulty moving her extremities. In NAD   HENT:   Head: Normocephalic and atraumatic.   Eyes: EOM are " "normal. Pupils are equal, round, and reactive to light.   Neck: Normal range of motion. Neck supple. No JVD present. No tracheal deviation present. No thyromegaly present.   Cardiovascular: Normal rate, regular rhythm and normal heart sounds.  Exam reveals no friction rub.    No murmur heard.  Pulmonary/Chest: Effort normal.   Coarse breat sounds on left. Weak cough.   Musculoskeletal: She exhibits edema (2 plus pitting bilateral legs.). She exhibits no tenderness or deformity.   Neurological: She is alert and oriented to person, place, and time. No cranial nerve deficit.   Skin:   Multiple fissured areas in pannus   Psychiatric: She has a normal mood and affect.   Poor insight. Expresses rather unrealistic expectations of how she is going to get around better.    Nursing note reviewed.      (consider inserting and editing \".LEXEXAMHP\" for qualifying comprehensive exam for Level 3 billing)    Results Reviewed:  I have personally reviewed current lab, radiology, and data and agree.      Results from last 7 days  Lab Units 11/04/17  0025   WBC 10*3/mm3 12.45*   HEMOGLOBIN g/dL 9.7*   HEMATOCRIT % 31.4*   PLATELETS 10*3/mm3 277       Results from last 7 days  Lab Units 11/04/17  0026   SODIUM mmol/L 145   POTASSIUM mmol/L 3.2*   CHLORIDE mmol/L 108   CO2 mmol/L 29.0   BUN mg/dL 41*   CREATININE mg/dL 2.10*   GLUCOSE mg/dL 152*   CALCIUM mg/dL 8.7   ALT (SGPT) U/L 13   AST (SGOT) U/L 16     No results found for: BNP  No results found for: PHART  Imaging Results (last 24 hours)     Procedure Component Value Units Date/Time    CT Abdomen Pelvis Without Contrast [[id number]] Collected:  11/04/17 0041     Updated:  11/04/17 0117    Narrative:       EXAM:    CT Abdomen and Pelvis Without Intravenous Contrast    CLINICAL HISTORY:    67 years old, female; Pain; Abdominal pain; Patient HX: No prior abd imaging   at this facility; Additional info: Lower abdominal pain    TECHNIQUE:    Axial computed tomography images of the " abdomen and pelvis without   intravenous contrast.  All CT scans at this facility use one or more dose   reduction techniques, viz.: automated exposure control; ma/kV adjustment per   patient size (including targeted exams where dose is matched to indication;   i.e. head); or iterative reconstruction technique.    Coronal reformatted images were created and reviewed.    COMPARISON:    CT NEEDLE BIOPSY KIDNEY 2017-08-10 14:00    FINDINGS:    Limitations:  Evaluation of the soft tissues is somewhat limited by lack of   intravenous contrast.  Partial exclusion of the abdominal wall due to patient   body habitus.    Lower thorax:  Partially visualized moderate to large right pleural effusion.    Cardiomegaly.  Trace left pleural effusion.  Basal atelectasis.     ABDOMEN:    Liver:  Unremarkable.    Gallbladder and bile ducts:  Unremarkable.  No calcified stones.  No ductal   dilation.    Pancreas:  Unremarkable.  No ductal dilation.    Spleen:  Unremarkable.  No splenomegaly.    Adrenals:  Unremarkable.  No mass.    Kidneys and ureters:  Bilateral renal cysts.  Possible small hyperdense right   renal cyst.  No obstructing stones.  No hydronephrosis.    Stomach and bowel:  Overall moderate colonic stool.  Prominent stool within   the sigmoid colon and rectal vault.  No obstruction.  No mucosal thickening.    Appendix:  No findings to suggest acute appendicitis.     PELVIS:    Bladder:  Unremarkable.  No stones.    Reproductive:  Possible uterine fibroids.     ABDOMEN and PELVIS:    Intraperitoneal space:  Fluid containing lower abdominal ventral hernia.  No   free air.    Bones/joints:  Mild degenerative disc disease is noted within the visualized   spine.  Mild bilateral hip DJD.  No acute fracture.  No dislocation.    Soft tissues:  Partially visualized body wall edema.    Vasculature:  Mild atherosclerosis.  No abdominal aortic aneurysm.    Lymph nodes:  Unremarkable.  No enlarged lymph nodes.      Impression:        1.  Fluid containing lower abdominal ventral hernia.  2.  Partially visualized moderate to large right pleural effusion.  3.  Overall moderate colonic stool.  Prominent stool within the sigmoid colon   and rectal vault.    THIS DOCUMENT HAS BEEN ELECTRONICALLY SIGNED BY ROSALIND JAQUEZ MD        Results for orders placed during the hospital encounter of 09/02/17   Adult Transthoracic Echo Complete    Narrative · Left ventricular wall thickness is consistent with mild concentric   hypertrophy.  · Left atrial cavity size is mildly dilated.  · Mild mitral valve regurgitation is present  · calcification of the aortic valve  · Mild tricuspid valve regurgitation is present.          Assessment/Plan   Assessment / Plan     Hospital Problem List     * (Principal)Abdominal pain    Morbid obesity    Anemia    Overview Addendum 8/28/2017 11:03 AM by Piyush Stokes MD     Iron deficient         CKD (chronic kidney disease) Stage 3    Overview Signed 8/28/2017 11:03 AM by Piyush Stokes MD     Followed by nephrology associates (improving)         Diabetes mellitus type 2, insulin dependent (Chronic)    Chronic heart failure (Chronic)            Assessment & Plan:  1. Abdominal pain: Somewhat improved after BM. Will continue with regular meds. Further workup if needed, but  No evidence of acute problem now. c diff ordered by ED given the recent dx and treatment.   2. Morbid Obesity with multiple complications and mobility/self care issues. Patient encouraged to reconsider SNF placement since she is obviously can not manage on her own at this point and her daughter works too much to be able to be there to help. Case management to see. PT/OT.  3. Anemia: hx of Fe defiency and probably chronic disease.  4. CKD appears stable around stage III. \  5. Diabetes 2. Insulin dependent. AC/HS fsbg. Does not require much insulin.  6. Chronic heart failure. 2 plus pitting edema. Appears fairly stable. Continue same med regimen.    DC  Planning: Case management to follow. Difficult situation as patient has failed with just a minimum of time at home to manage ADL's.  DVT prophylaxis: renal dose heparin.     CODE STATUS:  Prior full    Admission Status:  I believe this patient meets obs (insert and edit “.LEXOBS” or “.LEXINPT”, add any supporting concerns here to justify INPATIENT status, for OBS patient no discussion is required)    Maddi VBrenden Dodge, APRN   11/04/17   4:31 AM      PHYSICIAN NOTE(ADDENDUM)     PM HX  Morbidly obese  Has been in and out of the hospital since July.  Recent admission in October with UTI, C. difficile, transferred to rehabilitation, was just discharged on 11/3/2017 to home, but poor social support at home.  -Limited mobility.  Mood disorder and anxiety.  Chronic pedal edema on Lasix.  Hypertension/hyperlipidemia.  DM 2.  -Chronic renal insufficiency  Chronic anemia-patient had EGD done on 8/17 which shows chronic gastritis but she refused colonoscopy.     HPI  67-year-old female who presented back to ER secondary to complain of lower abdominal pain and episode of loose BM, CT abdomen showed constipation, C. difficile still pending.  -Her UA is negative, her chest x-ray shows some right basal effusion-somewhat chronic has been treated with antibiotics for pneumonia in past, but doesn't appear to be clearing.  Her last echo done in September 2017 shows ejection fraction of 79% no mention of diastolic dysfunction.  -Complains of chronic cough no worsening.  On exam significant pedal edema but that is chronic as per patient, no abdominal tenderness elicited good bowel sounds, heart S1-S2 regular no murmur.     A/P  -Case management consult since patient does not want to go to rehabilitation and would like to go home but does not have good social support.  -Follow-up C. difficile results.  -CT chest to evaluate right pleural effusion.  -Her chronic conditions are stable did not change much.  -Rest assessment and plan as per  NP's note.     I have independently seen and examined the patient with ARNP and the note above reflects my changes and contributions. I have discussed with findings, diagnosis and plans with the patient and family.      Isabel Nava MD 11/04/17 5:20 AM

## 2017-11-04 NOTE — PROGRESS NOTES
"IM update note:    66 yo F with history of recent CAUTI and C diff infection, recently finished PO Vanc course, sent to rehab, and recently discharged from rehab, and brought back due to abd pain with diarrhea. No fever or chills. Still very weak and requires significant assistance with ADL, but pt reported that her daughter doesn't believe her that she's \"ill.\" Currently denies any other symptoms. Appears chronically ill, but nontoxic.  SW/CM and PT/OT to evaluate. Pt will likely need long term placement if daughter cannot care for her at home. Stool for C diff already ordered and results are pending.  "

## 2017-11-04 NOTE — PROGRESS NOTES
Pharmacy asked to review current medications for possible causes of urinary retention.  Only one medication,Lexapro, has a < 1% chance of causing urinary retention.  Thank you.  LALITHA Sinha.Ph.  11/4/2017  2:24 PM

## 2017-11-04 NOTE — NURSING NOTE
Patient seen for shear/ friction and yeast in skin folds, perineal area and groin area. Interdry and antifungal powder ordered for these area and venelex for perinea and thigh/ gluteal wound which is epithelializing. A [facility] bariatric bed ordered at 1700. CWOCN will s/o and please reconsult for any further needs or concerns.

## 2017-11-04 NOTE — ED PROVIDER NOTES
Subjective   HPI Comments: 67-year-old female who was recently admitted to the hospital for evaluation of urinary tract infection.  Patient was discharged for rehabilitation Center the day, reports developing abdominal pain in the lower anterior abdomen.  Upon presentation here she continues to have the pain.  Patient had a large bowel movement prior to be evaluated the patient, she reports that led to complete resolution of pain.  She denies chest pain or difficult breathing.  No fever.  Stool is loose in appearance with heidi consistency. No watery diarrhea has been present.  No other acute complaints.  Upon further discussion with the patient about her social situation it seems the patient does not have good social support. I am concerned she is unable to walk and perform activities of daily living, and primary family member, daughter, admits she is not going to be able to help with these activities either.     Patient is a 67 y.o. female presenting with abdominal pain.   Abdominal Pain   Pain location:  Suprapubic  Pain quality: cramping    Pain radiates to:  Does not radiate  Pain severity:  Moderate  Onset quality:  Sudden  Duration:  1 day  Timing:  Constant  Progression:  Resolved  Chronicity:  New  Context: recent illness (Just released from rehabilitation)    Context: not alcohol use, not awakening from sleep and not suspicious food intake    Relieved by:  Nothing  Worsened by:  Nothing  Ineffective treatments:  None tried  Associated symptoms: constipation    Associated symptoms: no chest pain, no chills, no cough, no diarrhea, no dysuria, no fatigue, no fever, no nausea, no shortness of breath, no sore throat and no vomiting    Risk factors: being elderly, obesity and recent hospitalization    Risk factors: no alcohol abuse and has not had multiple surgeries        Review of Systems   Constitutional: Negative for chills, fatigue and fever.   HENT: Negative for congestion, ear pain, postnasal drip, sinus  pressure and sore throat.    Eyes: Negative for pain, redness and visual disturbance.   Respiratory: Negative for cough, chest tightness and shortness of breath.    Cardiovascular: Negative for chest pain, palpitations and leg swelling.   Gastrointestinal: Positive for abdominal pain and constipation. Negative for anal bleeding, blood in stool, diarrhea, nausea and vomiting.   Endocrine: Negative for polydipsia and polyuria.   Genitourinary: Negative for difficulty urinating, dysuria, frequency and urgency.   Musculoskeletal: Negative for arthralgias, back pain and neck pain.   Skin: Negative for pallor and rash.   Allergic/Immunologic: Negative for environmental allergies and immunocompromised state.   Neurological: Negative for dizziness, weakness and headaches.   Hematological: Negative for adenopathy.   Psychiatric/Behavioral: Negative for confusion, self-injury and suicidal ideas. The patient is not nervous/anxious.    All other systems reviewed and are negative.      Past Medical History:   Diagnosis Date   • Anemia    • Anxiety    • Asthma    • Diabetes mellitus    • Hypertension    • Morbid obesity    • Osteoarthritis        No Known Allergies    Past Surgical History:   Procedure Laterality Date   •  SECTION     • COLONOSCOPY N/A 2017    Procedure: COLONOSCOPY;  Surgeon: Mark I Brunner, MD;  Location:  [facility];  Service:    • ENDOSCOPY N/A 2017    Procedure: ESOPHAGOGASTRODUODENOSCOPY ;  Surgeon: Velasquez Call MD;  Location:  [facility];  Service:    • HERNIA REPAIR         History reviewed. No pertinent family history.    Social History     Social History   • Marital status:      Spouse name: N/A   • Number of children: N/A   • Years of education: N/A     Social History Main Topics   • Smoking status: Former Smoker   • Smokeless tobacco: None   • Alcohol use No   • Drug use: No   • Sexual activity: No     Other Topics Concern   • None     Social History Narrative     lIVES ALONE.  PATIENT STATES SHE HAS HOME HEALTH COME IN.           Objective   Physical Exam   Constitutional: She is oriented to person, place, and time. She appears well-developed and well-nourished.  Non-toxic appearance. No distress.   HENT:   Head: Normocephalic and atraumatic.   Right Ear: External ear normal.   Left Ear: External ear normal.   Nose: Nose normal.   Eyes: EOM and lids are normal. Pupils are equal, round, and reactive to light.   Neck: Normal range of motion. Neck supple. No tracheal deviation present.   Cardiovascular: Normal rate, regular rhythm and normal heart sounds.  Exam reveals no gallop, no friction rub and no decreased pulses.    No murmur heard.  Pulmonary/Chest: Effort normal and breath sounds normal. No respiratory distress. She has no decreased breath sounds. She has no wheezes. She has no rhonchi. She has no rales.   Abdominal: Soft. Normal appearance and bowel sounds are normal. There is no tenderness. There is no rebound and no guarding.   Musculoskeletal: Normal range of motion. She exhibits no deformity.   Lymphadenopathy:     She has no cervical adenopathy.   Neurological: She is alert and oriented to person, place, and time. She has normal strength. No cranial nerve deficit or sensory deficit.   Skin: Skin is warm and dry. No rash noted. She is not diaphoretic.   Psychiatric: She has a normal mood and affect. Her speech is normal and behavior is normal. Judgment and thought content normal. Cognition and memory are normal.   Nursing note and vitals reviewed.      Procedures         ED Course  ED Course                  MDM  Number of Diagnoses or Management Options  Abdominal pain, unspecified abdominal location: new and requires workup  Diarrhea, unspecified type: new and requires workup  Physical deconditioning: new and requires workup  Diagnosis management comments: Patient's abdominal pain has resolved after the passage of a bowel movement.  The stool appeared to be a  heidi consistency, not watery.  C. difficile testing is pending.    Patient's laboratory evaluation shows increasing white blood cell count, and patient reports the current stool is consistent with what she had previously when she was C. difficile positive.    CT scan of the abdomen shows a ventral fluid containing hernia.  Also shows right pleural effusion.  All these findings are considered chronic, and previous imaging was used for comparison.    Due to questionable C. difficile positive stool, and significant physical deconditioning which has lead to the patient having the inability to take care of herself at home I have discussed the patient with the hospitalist for admission.    Dr. Nava to admit.        Amount and/or Complexity of Data Reviewed  Clinical lab tests: ordered and reviewed  Tests in the radiology section of CPT®: ordered and reviewed  Decide to obtain previous medical records or to obtain history from someone other than the patient: yes  Obtain history from someone other than the patient: yes  Review and summarize past medical records: yes  Discuss the patient with other providers: yes  Independent visualization of images, tracings, or specimens: yes    Patient Progress  Patient progress: stable      Final diagnoses:   Abdominal pain, unspecified abdominal location   Diarrhea, unspecified type   Physical deconditioning            Alexx Prieto MD  11/04/17 0301

## 2017-11-05 LAB
GLUCOSE BLDC GLUCOMTR-MCNC: 131 MG/DL (ref 70–130)
GLUCOSE BLDC GLUCOMTR-MCNC: 149 MG/DL (ref 70–130)
GLUCOSE BLDC GLUCOMTR-MCNC: 157 MG/DL (ref 70–130)
GLUCOSE BLDC GLUCOMTR-MCNC: 181 MG/DL (ref 70–130)
GLUCOSE BLDC GLUCOMTR-MCNC: 208 MG/DL (ref 70–130)

## 2017-11-05 PROCEDURE — 99226 PR SBSQ OBSERVATION CARE/DAY 35 MINUTES: CPT | Performed by: HOSPITALIST

## 2017-11-05 PROCEDURE — 94760 N-INVAS EAR/PLS OXIMETRY 1: CPT

## 2017-11-05 PROCEDURE — 94799 UNLISTED PULMONARY SVC/PX: CPT

## 2017-11-05 PROCEDURE — G0378 HOSPITAL OBSERVATION PER HR: HCPCS

## 2017-11-05 PROCEDURE — 94640 AIRWAY INHALATION TREATMENT: CPT

## 2017-11-05 PROCEDURE — 63710000001 INSULIN LISPRO (HUMAN) PER 5 UNITS: Performed by: FAMILY MEDICINE

## 2017-11-05 PROCEDURE — 82962 GLUCOSE BLOOD TEST: CPT

## 2017-11-05 PROCEDURE — 25010000002 HEPARIN (PORCINE) PER 1000 UNITS: Performed by: NURSE PRACTITIONER

## 2017-11-05 RX ORDER — AMLODIPINE BESYLATE 10 MG/1
10 TABLET ORAL DAILY
Status: DISCONTINUED | OUTPATIENT
Start: 2017-11-05 | End: 2017-11-10 | Stop reason: HOSPADM

## 2017-11-05 RX ORDER — SENNA AND DOCUSATE SODIUM 50; 8.6 MG/1; MG/1
2 TABLET, FILM COATED ORAL NIGHTLY
Status: DISCONTINUED | OUTPATIENT
Start: 2017-11-05 | End: 2017-11-10 | Stop reason: HOSPADM

## 2017-11-05 RX ORDER — BUMETANIDE 0.25 MG/ML
1 INJECTION INTRAMUSCULAR; INTRAVENOUS DAILY
Status: DISCONTINUED | OUTPATIENT
Start: 2017-11-05 | End: 2017-11-06

## 2017-11-05 RX ORDER — BISACODYL 10 MG
10 SUPPOSITORY, RECTAL RECTAL DAILY PRN
Status: DISCONTINUED | OUTPATIENT
Start: 2017-11-05 | End: 2017-11-10 | Stop reason: HOSPADM

## 2017-11-05 RX ADMIN — HEPARIN SODIUM 5000 UNITS: 5000 INJECTION, SOLUTION INTRAVENOUS; SUBCUTANEOUS at 20:17

## 2017-11-05 RX ADMIN — BUMETANIDE 1 MG: 0.25 INJECTION INTRAMUSCULAR; INTRAVENOUS at 17:55

## 2017-11-05 RX ADMIN — MICONAZOLE NITRATE: 20 POWDER TOPICAL at 09:16

## 2017-11-05 RX ADMIN — ISOSORBIDE MONONITRATE 60 MG: 60 TABLET, EXTENDED RELEASE ORAL at 09:15

## 2017-11-05 RX ADMIN — FUROSEMIDE 40 MG: 40 TABLET ORAL at 09:15

## 2017-11-05 RX ADMIN — Medication 250 MG: at 09:15

## 2017-11-05 RX ADMIN — IPRATROPIUM BROMIDE AND ALBUTEROL SULFATE 3 ML: .5; 3 SOLUTION RESPIRATORY (INHALATION) at 01:26

## 2017-11-05 RX ADMIN — FUROSEMIDE 40 MG: 40 TABLET ORAL at 17:55

## 2017-11-05 RX ADMIN — BUDESONIDE AND FORMOTEROL FUMARATE DIHYDRATE 2 PUFF: 160; 4.5 AEROSOL RESPIRATORY (INHALATION) at 21:20

## 2017-11-05 RX ADMIN — HYDROCODONE BITARTATE AND ACETAMINOPHEN 1 TABLET: 5; 325 TABLET ORAL at 13:06

## 2017-11-05 RX ADMIN — BUDESONIDE AND FORMOTEROL FUMARATE DIHYDRATE 2 PUFF: 160; 4.5 AEROSOL RESPIRATORY (INHALATION) at 08:27

## 2017-11-05 RX ADMIN — MICONAZOLE NITRATE: 20 POWDER TOPICAL at 21:00

## 2017-11-05 RX ADMIN — INSULIN LISPRO 2 UNITS: 100 INJECTION, SOLUTION INTRAVENOUS; SUBCUTANEOUS at 22:55

## 2017-11-05 RX ADMIN — HYDRALAZINE HYDROCHLORIDE 75 MG: 25 TABLET ORAL at 17:54

## 2017-11-05 RX ADMIN — HYDRALAZINE HYDROCHLORIDE 75 MG: 25 TABLET ORAL at 09:15

## 2017-11-05 RX ADMIN — HEPARIN SODIUM 5000 UNITS: 5000 INJECTION, SOLUTION INTRAVENOUS; SUBCUTANEOUS at 09:17

## 2017-11-05 RX ADMIN — HYDROCODONE BITARTATE AND ACETAMINOPHEN 1 TABLET: 5; 325 TABLET ORAL at 23:33

## 2017-11-05 RX ADMIN — CLONAZEPAM 0.25 MG: 0.5 TABLET ORAL at 09:13

## 2017-11-05 RX ADMIN — MULTIPLE VITAMINS W/ MINERALS TAB 1 TABLET: TAB ORAL at 09:14

## 2017-11-05 RX ADMIN — CASTOR OIL AND BALSAM, PERU: 788; 87 OINTMENT TOPICAL at 09:16

## 2017-11-05 RX ADMIN — INSULIN LISPRO 2 UNITS: 100 INJECTION, SOLUTION INTRAVENOUS; SUBCUTANEOUS at 12:57

## 2017-11-05 RX ADMIN — Medication 250 MG: at 17:55

## 2017-11-05 RX ADMIN — ATORVASTATIN CALCIUM 10 MG: 10 TABLET, FILM COATED ORAL at 20:17

## 2017-11-05 RX ADMIN — IPRATROPIUM BROMIDE AND ALBUTEROL SULFATE 3 ML: .5; 3 SOLUTION RESPIRATORY (INHALATION) at 08:27

## 2017-11-05 RX ADMIN — ESCITALOPRAM OXALATE 10 MG: 10 TABLET ORAL at 09:15

## 2017-11-05 RX ADMIN — IPRATROPIUM BROMIDE AND ALBUTEROL SULFATE 3 ML: .5; 3 SOLUTION RESPIRATORY (INHALATION) at 21:20

## 2017-11-05 RX ADMIN — HYDRALAZINE HYDROCHLORIDE 75 MG: 25 TABLET ORAL at 20:17

## 2017-11-05 RX ADMIN — AMLODIPINE BESYLATE 10 MG: 10 TABLET ORAL at 09:15

## 2017-11-05 RX ADMIN — CASTOR OIL AND BALSAM, PERU: 788; 87 OINTMENT TOPICAL at 21:00

## 2017-11-05 NOTE — PLAN OF CARE
Problem: Infection, Risk/Actual (Adult)  Goal: Identify Related Risk Factors and Signs and Symptoms  Outcome: Ongoing (interventions implemented as appropriate)    11/04/17 0604   Infection, Risk/Actual   Infection, Risk/Actual: Related Risk Factors chronic illness/condition;poor personal hygiene;malnutrition;skin integrity impairment       Goal: Infection Prevention/Resolution  Outcome: Ongoing (interventions implemented as appropriate)    Problem: Patient Care Overview (Adult)  Goal: Plan of Care Review  Outcome: Ongoing (interventions implemented as appropriate)  Goal: Adult Individualization and Mutuality  Outcome: Ongoing (interventions implemented as appropriate)    Problem: Fall Risk (Adult)  Goal: Identify Related Risk Factors and Signs and Symptoms  Outcome: Ongoing (interventions implemented as appropriate)  Goal: Absence of Falls  Outcome: Ongoing (interventions implemented as appropriate)    Problem: Skin Integrity Impairment, Risk/Actual (Adult)  Goal: Skin Integrity/Wound Healing  Outcome: Ongoing (interventions implemented as appropriate)    Problem: Pressure Ulcer Risk (Saurav Scale) (Adult,Obstetrics,Pediatric)  Goal: Identify Related Risk Factors and Signs and Symptoms  Outcome: Ongoing (interventions implemented as appropriate)  Goal: Skin Integrity  Outcome: Outcome(s) achieved Date Met:  11/05/17

## 2017-11-05 NOTE — PROGRESS NOTES
"    [facility] Medicine Services  PROGRESS NOTE    Patient Name: Joy Bueno  : 1950  MRN: [id number]    Date of Admission: 11/3/2017  Length of Stay: 0  Primary Care Physician: Trevon Delarosa MD    Subjective   Subjective     CC:  \"I don't feel good,\" but not able to specify/provide any further information.    HPI:  No acute events O/N. Pt is sitting up in bed, c/o \"not feel good.\" Denies CP or palpitations. Having some SOA, but not worse than yesterday, + fatigue, + abd pain, no further diarrhea, no fever or chills. + LE edema.    Review of Systems    Otherwise ROS is negative except as mentioned in the HPI.    Objective   Objective     Vital Signs:   Temp:  [97.9 °F (36.6 °C)-98.8 °F (37.1 °C)] 97.9 °F (36.6 °C)  Heart Rate:  [78-84] 78  Resp:  [16-18] 16  BP: (151-161)/(71-81) 161/81        Physical Exam:  Constitutional: No acute distress, awake, alert  Eyes: PERRLA, sclerae anicteric, no conjunctival injection  HENT: NCAT, mucous membranes moist  Neck: Supple, trachea midline  Respiratory: Clear to auscultation bilaterally, mildly decreased at bases,  nonlabored respirations   Cardiovascular: RRR, no murmurs, rubs, or gallops  Gastrointestinal: Obese, Positive bowel sounds, soft, nontender, nondistended  Musculoskeletal: 2+ LE edema, no clubbing or cyanosis to extremities  Psychiatric: Appropriate affect, cooperative  Neurologic: Oriented x 3,  No facial asymmetry, speech clear  Skin: No rashes      Results Reviewed:  I have personally reviewed current lab, radiology, and data and agree.      Results from last 7 days  Lab Units 17  0025   WBC 10*3/mm3 12.45*   HEMOGLOBIN g/dL 9.7*   HEMATOCRIT % 31.4*   PLATELETS 10*3/mm3 277       Results from last 7 days  Lab Units 17  0026   SODIUM mmol/L 145   POTASSIUM mmol/L 3.2*   CHLORIDE mmol/L 108   CO2 mmol/L 29.0   BUN mg/dL 41*   CREATININE mg/dL 2.10*   GLUCOSE mg/dL 152*   CALCIUM mg/dL 8.7   ALT (SGPT) U/L 13 "   AST (SGOT) U/L 16     No results found for: BNP  No results found for: PHART    Microbiology Results Abnormal     Procedure Component Value - Date/Time    Urine Culture - Urine, Urine, Clean Catch [[id number]]  (Normal) Collected:  11/04/17 0215    Lab Status:  Preliminary result Specimen:  Urine from Urine, Catheter Updated:  11/05/17 0636     Urine Culture No growth    Clostridium Difficile Toxin - Stool, Per Rectum [[id number]] Collected:  11/04/17 0819    Lab Status:  Final result Specimen:  Stool from Per Rectum Updated:  11/04/17 1052    Narrative:       The following orders were created for panel order Clostridium Difficile Toxin - Stool, Per Rectum.  Procedure                               Abnormality         Status                     ---------                               -----------         ------                     Clostridium Difficile To...[[id number]]  Normal              Final result                 Please view results for these tests on the individual orders.    Clostridium Difficile Toxin, PCR - Stool, Per Rectum [[id number]]  (Normal) Collected:  11/04/17 0819    Lab Status:  Final result Specimen:  Stool from Per Rectum Updated:  11/04/17 1052     C. Difficile Toxins by PCR Not Detected    Narrative:         Performance characteristics of test not established for patients <2 years of age.  Negative for Toxigenic C. Difficile          Imaging Results (last 24 hours)     ** No results found for the last 24 hours. **        Results for orders placed during the hospital encounter of 09/02/17   Adult Transthoracic Echo Complete    Narrative · Left ventricular wall thickness is consistent with mild concentric   hypertrophy.  · Left atrial cavity size is mildly dilated.  · Mild mitral valve regurgitation is present  · calcification of the aortic valve  · Mild tricuspid valve regurgitation is present.          I have reviewed the medications.    Assessment/Plan   Assessment / Plan     Hospital Problem  List     * (Principal)Abdominal pain    Asthma    Hypertension    Morbid obesity    Anxiety    Anemia    Overview Addendum 8/28/2017 11:03 AM by Piyush Stokes MD     Iron deficient         CKD (chronic kidney disease) Stage 3    Overview Signed 8/28/2017 11:03 AM by Piyush Stokes MD     Followed by nephrology associates (improving)         Generalized weakness    Diabetes mellitus type 2, insulin dependent (Chronic)    Chronic heart failure (Chronic)             Brief Hospital Course to date:  66 yo F with history of recent CAUTI and C diff infection, recently finished PO Vanc course, sent to rehab, and recently discharged from rehab, been home few days, and brought back due to abd pain with diarrhea, but no BM recorded here after 2 days of hospitalization. No fever or chills. CT C/A/P showed moderate stool retention, as well as pleural effusion, which has improved when compared to previous exam. Still very weak and requires significant assistance with ADL.      Assessment & Plan:  HTN  - suboptimal at this time  - increase Norvasc, plus PRN med    Asthma  - no exac    Urinary retention/history of CAUTI  - etiology unclear, appreciate pharm assistance with reviewing poss medication culprit  - repeat UA neg    Recent C diff infection/abd pain/stool retention  - no diarrhea here, but pt reported she had some at home  - complete 14 day course of PO Vanc, will not repeat lab  - Bowel care PRN    DM2  - A1C 5.4, poss poor prognostic indicator vs false low due to chronic anemia  - SSI    Anxiety  - SSRI    Chronic DHF  - diurese and monitor strict I&O    CKD  - base creatinine 1.5-2.2  - monitor    Anemia  - chronic and stable    Morbid obesity  - multifactorial    Gen weakness/deconditioning  - multifactorial  PT/OT consulted  - requires assistance with ADL  - will need CM/SW's assistance, not sure if daughter will be able to care for pt. Recently discharged from rehab and may have ran out of short term stay  days. Pt is interested in rehab. Discussed poss long term placement if pt not able to go to rehab and daughter unable to care for her, but she is not interested at this time.       DVT Prophylaxis:  Hep sq    CODE STATUS: Full Code    Disposition: TBD    Matthew Ackerman MD  11/05/17  2:17 PM

## 2017-11-06 LAB
ANION GAP SERPL CALCULATED.3IONS-SCNC: 7 MMOL/L (ref 3–11)
BACTERIA SPEC AEROBE CULT: NORMAL
BUN BLD-MCNC: 47 MG/DL (ref 9–23)
BUN/CREAT SERPL: 19.6 (ref 7–25)
CALCIUM SPEC-SCNC: 7.8 MG/DL (ref 8.7–10.4)
CHLORIDE SERPL-SCNC: 108 MMOL/L (ref 99–109)
CO2 SERPL-SCNC: 28 MMOL/L (ref 20–31)
CREAT BLD-MCNC: 2.4 MG/DL (ref 0.6–1.3)
GFR SERPL CREATININE-BSD FRML MDRD: 24 ML/MIN/1.73
GLUCOSE BLD-MCNC: 94 MG/DL (ref 70–100)
GLUCOSE BLDC GLUCOMTR-MCNC: 100 MG/DL (ref 70–130)
GLUCOSE BLDC GLUCOMTR-MCNC: 133 MG/DL (ref 70–130)
GLUCOSE BLDC GLUCOMTR-MCNC: 187 MG/DL (ref 70–130)
GLUCOSE BLDC GLUCOMTR-MCNC: 204 MG/DL (ref 70–130)
POTASSIUM BLD-SCNC: 3.1 MMOL/L (ref 3.5–5.5)
SODIUM BLD-SCNC: 143 MMOL/L (ref 132–146)

## 2017-11-06 PROCEDURE — G0378 HOSPITAL OBSERVATION PER HR: HCPCS

## 2017-11-06 PROCEDURE — G8982 BODY POS GOAL STATUS: HCPCS

## 2017-11-06 PROCEDURE — 94799 UNLISTED PULMONARY SVC/PX: CPT

## 2017-11-06 PROCEDURE — G8981 BODY POS CURRENT STATUS: HCPCS

## 2017-11-06 PROCEDURE — G8988 SELF CARE GOAL STATUS: HCPCS

## 2017-11-06 PROCEDURE — G8987 SELF CARE CURRENT STATUS: HCPCS

## 2017-11-06 PROCEDURE — 82962 GLUCOSE BLOOD TEST: CPT

## 2017-11-06 PROCEDURE — 97162 PT EVAL MOD COMPLEX 30 MIN: CPT

## 2017-11-06 PROCEDURE — 80048 BASIC METABOLIC PNL TOTAL CA: CPT | Performed by: HOSPITALIST

## 2017-11-06 PROCEDURE — 99226 PR SBSQ OBSERVATION CARE/DAY 35 MINUTES: CPT | Performed by: HOSPITALIST

## 2017-11-06 PROCEDURE — 97165 OT EVAL LOW COMPLEX 30 MIN: CPT

## 2017-11-06 PROCEDURE — 25010000002 HEPARIN (PORCINE) PER 1000 UNITS: Performed by: NURSE PRACTITIONER

## 2017-11-06 PROCEDURE — 94640 AIRWAY INHALATION TREATMENT: CPT

## 2017-11-06 RX ORDER — HYDRALAZINE HYDROCHLORIDE 50 MG/1
100 TABLET, FILM COATED ORAL 3 TIMES DAILY
Status: DISCONTINUED | OUTPATIENT
Start: 2017-11-06 | End: 2017-11-10 | Stop reason: HOSPADM

## 2017-11-06 RX ORDER — CALCIUM CARBONATE 200(500)MG
3 TABLET,CHEWABLE ORAL 3 TIMES DAILY PRN
Status: DISCONTINUED | OUTPATIENT
Start: 2017-11-06 | End: 2017-11-10 | Stop reason: HOSPADM

## 2017-11-06 RX ORDER — POTASSIUM CHLORIDE 1.5 G/1.77G
40 POWDER, FOR SOLUTION ORAL AS NEEDED
Status: DISCONTINUED | OUTPATIENT
Start: 2017-11-06 | End: 2017-11-10 | Stop reason: HOSPADM

## 2017-11-06 RX ORDER — POTASSIUM CHLORIDE 750 MG/1
40 CAPSULE, EXTENDED RELEASE ORAL AS NEEDED
Status: DISCONTINUED | OUTPATIENT
Start: 2017-11-06 | End: 2017-11-10 | Stop reason: HOSPADM

## 2017-11-06 RX ADMIN — Medication 250 MG: at 09:40

## 2017-11-06 RX ADMIN — Medication 2 TABLET: at 15:12

## 2017-11-06 RX ADMIN — POTASSIUM CHLORIDE 40 MEQ: 750 CAPSULE, EXTENDED RELEASE ORAL at 20:28

## 2017-11-06 RX ADMIN — ISOSORBIDE MONONITRATE 60 MG: 60 TABLET, EXTENDED RELEASE ORAL at 09:39

## 2017-11-06 RX ADMIN — CASTOR OIL AND BALSAM, PERU: 788; 87 OINTMENT TOPICAL at 21:17

## 2017-11-06 RX ADMIN — ATORVASTATIN CALCIUM 10 MG: 10 TABLET, FILM COATED ORAL at 20:28

## 2017-11-06 RX ADMIN — BUDESONIDE AND FORMOTEROL FUMARATE DIHYDRATE 2 PUFF: 160; 4.5 AEROSOL RESPIRATORY (INHALATION) at 09:09

## 2017-11-06 RX ADMIN — BUDESONIDE AND FORMOTEROL FUMARATE DIHYDRATE 2 PUFF: 160; 4.5 AEROSOL RESPIRATORY (INHALATION) at 20:57

## 2017-11-06 RX ADMIN — HEPARIN SODIUM 5000 UNITS: 5000 INJECTION, SOLUTION INTRAVENOUS; SUBCUTANEOUS at 20:40

## 2017-11-06 RX ADMIN — HYDROCODONE BITARTATE AND ACETAMINOPHEN 1 TABLET: 5; 325 TABLET ORAL at 04:03

## 2017-11-06 RX ADMIN — HYDRALAZINE HYDROCHLORIDE 75 MG: 25 TABLET ORAL at 09:40

## 2017-11-06 RX ADMIN — POTASSIUM CHLORIDE 40 MEQ: 750 CAPSULE, EXTENDED RELEASE ORAL at 09:40

## 2017-11-06 RX ADMIN — MICONAZOLE NITRATE: 20 POWDER TOPICAL at 21:17

## 2017-11-06 RX ADMIN — AMLODIPINE BESYLATE 10 MG: 10 TABLET ORAL at 09:40

## 2017-11-06 RX ADMIN — HYDROCODONE BITARTATE AND ACETAMINOPHEN 1 TABLET: 5; 325 TABLET ORAL at 09:40

## 2017-11-06 RX ADMIN — HEPARIN SODIUM 5000 UNITS: 5000 INJECTION, SOLUTION INTRAVENOUS; SUBCUTANEOUS at 09:39

## 2017-11-06 RX ADMIN — HYDRALAZINE HYDROCHLORIDE 100 MG: 25 TABLET ORAL at 20:29

## 2017-11-06 RX ADMIN — HYDROCODONE BITARTATE AND ACETAMINOPHEN 1 TABLET: 5; 325 TABLET ORAL at 20:28

## 2017-11-06 RX ADMIN — ESCITALOPRAM OXALATE 10 MG: 10 TABLET ORAL at 09:40

## 2017-11-06 RX ADMIN — INSULIN LISPRO 2 UNITS: 100 INJECTION, SOLUTION INTRAVENOUS; SUBCUTANEOUS at 20:40

## 2017-11-06 RX ADMIN — MICONAZOLE NITRATE: 20 POWDER TOPICAL at 09:39

## 2017-11-06 RX ADMIN — SIMETHICONE CHEW TAB 80 MG 80 MG: 80 TABLET ORAL at 03:58

## 2017-11-06 RX ADMIN — MULTIPLE VITAMINS W/ MINERALS TAB 1 TABLET: TAB ORAL at 09:39

## 2017-11-06 RX ADMIN — CASTOR OIL AND BALSAM, PERU: 788; 87 OINTMENT TOPICAL at 09:41

## 2017-11-06 RX ADMIN — HYDRALAZINE HYDROCHLORIDE 75 MG: 25 TABLET ORAL at 15:08

## 2017-11-06 RX ADMIN — Medication 250 MG: at 17:41

## 2017-11-06 RX ADMIN — FUROSEMIDE 40 MG: 40 TABLET ORAL at 09:40

## 2017-11-06 RX ADMIN — IPRATROPIUM BROMIDE AND ALBUTEROL SULFATE 3 ML: .5; 3 SOLUTION RESPIRATORY (INHALATION) at 20:57

## 2017-11-06 RX ADMIN — FUROSEMIDE 40 MG: 40 TABLET ORAL at 17:40

## 2017-11-06 RX ADMIN — INSULIN LISPRO 3 UNITS: 100 INJECTION, SOLUTION INTRAVENOUS; SUBCUTANEOUS at 17:41

## 2017-11-06 RX ADMIN — CLONAZEPAM 0.25 MG: 0.5 TABLET ORAL at 20:39

## 2017-11-06 NOTE — THERAPY EVALUATION
Acute Care - Physical Therapy Initial Evaluation   [facility]     Patient Name: Joy Bueno  : 1950  MRN: [id number]  Today's Date: 2017   Onset of Illness/Injury or Date of Surgery Date: 17  Date of Referral to PT: 17  Referring Physician: SUELLEN Dodge      Admit Date: 11/3/2017     Visit Dx:    ICD-10-CM ICD-9-CM   1. Abdominal pain, unspecified abdominal location R10.9 789.00   2. Diarrhea, unspecified type R19.7 787.91   3. Physical deconditioning R53.81 799.3   4. Impaired functional mobility, balance, gait, and endurance Z74.09 V49.89   5. Impaired mobility and ADLs Z74.09 799.89     Patient Active Problem List   Diagnosis   • Asthma   • Diabetes mellitus   • Hypertension   • Symptomatic anemia   • Morbid obesity   • Anxiety   • Anemia   • CKD (chronic kidney disease) Stage 3   • Gastritis   • Pleural effusion, right   • Possible pneumonia of RML/RLL   • Clostridium difficile diarrhea   • Urinary tract infection associated with indwelling urethral catheter   • Bilateral leg pain   • Generalized weakness   • Abdominal pain   • Diabetes mellitus type 2, insulin dependent   • Chronic heart failure     Past Medical History:   Diagnosis Date   • Anemia    • Anxiety    • Asthma    • Diabetes mellitus    • Hypertension    • Morbid obesity    • Osteoarthritis      Past Surgical History:   Procedure Laterality Date   •  SECTION     • COLONOSCOPY N/A 2017    Procedure: COLONOSCOPY;  Surgeon: Mark I Brunner, MD;  Location:  [facility];  Service:    • ENDOSCOPY N/A 2017    Procedure: ESOPHAGOGASTRODUODENOSCOPY ;  Surgeon: Velasquez Call MD;  Location:  [facility];  Service:    • HERNIA REPAIR            PT ASSESSMENT (last 72 hours)      PT Evaluation       17 1019 17 1515    Rehab Evaluation    Document Type evaluation  -KM     Subjective Information agree to therapy  -KM     Patient Effort, Rehab Treatment adequate  -KM     General Information     Patient Profile Review yes  -KM     Onset of Illness/Injury or Date of Surgery Date 11/03/17  -KM     Referring Physician SUELLEN Dodge  -KM     Pertinent History Of Current Problem Admitted with abdominal pain and diarrhea, + UTI and C diff. Recent [facility] admit 10/4-10/11 and then to SNF for rehab. Recently d/c'd home   -KM     Precautions/Limitations --   morbidly obese  -KM     Prior Level of Function dependent:;gait;transfer;ADL's   sleeping in recliner  -KM     Equipment Currently Used at Home walker, standard;power chair, (recliner lift);wheelchair  -KM     Barriers to Rehab previous functional deficit   working on sts at rehab, not mobile at d/c home  -KM     Living Environment    Lives With alone  -KM     Living Arrangements house  -KM     Home Accessibility no concerns  -KM     Transportation Available  ambulance  -KB    Living Environment Comment --   grandtrs assisting to clean in chair, could not get up  -KM     Clinical Impression    Date of Referral to PT 11/04/17  -KM     PT Diagnosis impaired mobility  -KM     Patient/Family Goals Statement improve function  -KM     Criteria for Skilled Therapeutic Interventions Met yes  -KM     Rehab Potential fair, will monitor progress closely  -KM     Pain Assessment    Pain Assessment No/denies pain  -KM     Cognitive Assessment/Intervention    Current Cognitive/Communication Assessment functional  -KM     Orientation Status oriented x 4  -KM     Follows Commands/Answers Questions able to follow single-step instructions;100% of the time  -KM     Personal Safety WNL/WFL  -KM     Personal Safety Interventions fall prevention program maintained  -KM     ROM (Range of Motion)    General ROM lower extremity range of motion deficits identified   l/e end range limitations due to obesity  -KM     MMT (Manual Muscle Testing)    General MMT Assessment lower extremity strength deficits identified   3-/5 grossly b l/es  -KM     Bed Mobility, Assessment/Treatment    Bed  Mobility, Assistive Device bed rails;draw sheet  -KM     Bed Mobility, Roll Left, [place] dependent (less than 25% patient effort)  -KM     Bed Mobility, Roll Right, [place] dependent (less than 25% patient effort)  -KM     Bed Mobility, Safety Issues decreased use of arms for pushing/pulling;decreased use of legs for bridging/pushing  -KM     Bed Mobility, Comment --   can reach for rails, cannot rotate trunk or hips  -KM     Transfer Assessment/Treatment    Transfers, Bed-Chair [place] not appropriate to assess   pt requires lift use, no bariatric chair in room  -KM     Positioning and Restraints    Pre-Treatment Position in bed  -KM     Post Treatment Position bed  -KM     In Bed notified nsg;supine;call light within reach;encouraged to call for assist  -KM       11/04/17 1500       General Information    Equipment Currently Used at Home wheelchair;oxygen;walker, standard;glucometer  -TH     Living Environment    Lives With alone  -TH     Living Arrangements house  -TH     Home Accessibility other (see comments)   unable to care for self at   -TH     Stair Railings at Home none  -TH     Type of Financial/Environmental Concern none  -TH     Transportation Available family or friend will provide;car  -TH       User Key  (r) = Recorded By, (t) = Taken By, (c) = Cosigned By    Initials Name Provider Type     Siobhan Miller, PT Physical Therapist    CHAVA Ortiz, RN Registered Nurse     Rosa Isela Schulte, RN Registered Nurse          Physical Therapy Education     Title: PT OT SLP Therapies (Active)     Topic: Physical Therapy (Done)     Point: Mobility training (Done)    Learning Progress Summary    Learner Readiness Method Response Comment Documented by Status   Patient Acceptance E VU discussed plan of care  11/06/17 1141 Done               Point: Home exercise program (Done)    Learning Progress Summary    Learner Readiness Method Response Comment Documented by Status   Patient  Acceptance E VU discussed plan of care KM 11/06/17 1141 Done               Point: Body mechanics (Done)    Learning Progress Summary    Learner Readiness Method Response Comment Documented by Status   Patient Acceptance E VU discussed plan of care KM 11/06/17 1141 Done               Point: Precautions (Done)    Learning Progress Summary    Learner Readiness Method Response Comment Documented by Status   Patient Acceptance E VU discussed plan of care KM 11/06/17 1141 Done                      User Key     Initials Effective Dates Name Provider Type Discipline     06/19/15 -  Siobhan Miller PT Physical Therapist PT                PT Recommendation and Plan  Anticipated Discharge Disposition: skilled nursing facility  Plan of Care Review  Plan Of Care Reviewed With: patient  Outcome Summary/Follow up Plan: Pt presents with severely limited ability to mobilize, morbid obesity, recent rehab stay and d/c home. Recommend placement for long term rehab. Will require lift oob to bariatric chair to attempt sit to stand progression          IP PT Goals       11/06/17 1137          Bed Mobility PT LTG    Bed Mobility PT LTG, Date Established 11/06/17  -KM      Bed Mobility PT LTG, Time to Achieve 2 wks  -KM      Bed Mobility PT LTG, Activity Type roll left/roll right  -KM      Bed Mobility PT LTG, [place] Level moderate assist (50% patient effort);2 person assist required  -KM      Transfer Training PT LTG    Transfer Training PT LTG, Date Established 11/06/17  -KM      Transfer Training PT LTG, Time to Achieve 2 wks  -KM      Transfer Training PT LTG, Activity Type sit to stand/stand to sit  -KM      Transfer Training PT LTG, [place] Level moderate assist (50% patient effort);2 person assist required  -KM        User Key  (r) = Recorded By, (t) = Taken By, (c) = Cosigned By    Initials Name Provider Type     Siobhan Miller, FRANCOISE Physical Therapist                Outcome Measures       11/06/17 1019           How much help from another person do you currently need...    Turning from your back to your side while in flat bed without using bedrails? 1  -KM      Moving from lying on back to sitting on the side of a flat bed without bedrails? 1  -KM      Moving to and from a bed to a chair (including a wheelchair)? 1  -KM      Standing up from a chair using your arms (e.g., wheelchair, bedside chair)? 1  -KM      Climbing 3-5 steps with a railing? 1  -KM      To walk in hospital room? 1  -KM      AM-PAC 6 Clicks Score 6  -KM      Functional Assessment    Outcome Measure Options AM-PAC 6 Clicks Basic Mobility (PT)  -KM        User Key  (r) = Recorded By, (t) = Taken By, (c) = Cosigned By    Initials Name Provider Type    SERGEI Miller PT Physical Therapist           Time Calculation:         PT Charges       11/06/17 1136          Time Calculation    Start Time 1019  -KM      PT Received On 11/06/17  -KM      PT Goal Re-Cert Due Date 11/16/17  -KM        User Key  (r) = Recorded By, (t) = Taken By, (c) = Cosigned By    Initials Name Provider Type    SERGEI Miller PT Physical Therapist          Therapy Charges for Today     Code Description Service Date Service Provider Modifiers Qty    [id number]  PT EVAL MOD COMPLEXITY 4 11/6/2017 Siobhan Miller, PT GP 1    [id number]  PT NG MAIN POS CURRENT 11/6/2017 Siobhan Miller, PT GP, CN 1    [id number]  PT [facility] MAIN POS PROJECTED 11/6/2017 Siobhan Miller, PT GP, CL 1          PT G-Codes  Outcome Measure Options: AM-PAC 6 Clicks Basic Mobility (PT)  Score: 6  Functional Limitation: Changing and maintaining body position  Changing and Maintaining Body Position Current Status (): 100 percent impaired, limited or restricted  Changing and Maintaining Body Position Goal Status (): At least 60 percent but less than 80 percent impaired, limited or restricted      Siobhan Miller, PT  11/6/2017

## 2017-11-06 NOTE — PLAN OF CARE
Problem: Patient Care Overview (Adult)  Goal: Plan of Care Review  Outcome: Ongoing (interventions implemented as appropriate)    11/06/17 1137   Coping/Psychosocial Response Interventions   Plan Of Care Reviewed With patient   Outcome Evaluation   Outcome Summary/Follow up Plan Pt presents with severely limited ability to mobilize, morbid obesity, recent rehab stay and d/c home. Recommend placement for long term rehab. Will require lift oob to bariatric chair to attempt sit to stand progression         Problem: Inpatient Physical Therapy  Goal: Bed Mobility Goal LTG- PT  Outcome: Ongoing (interventions implemented as appropriate)    11/06/17 1137   Bed Mobility PT LTG   Bed Mobility PT LTG, Date Established 11/06/17   Bed Mobility PT LTG, Time to Achieve 2 wks   Bed Mobility PT LTG, Activity Type roll left/roll right   Bed Mobility PT LTG, [place] Level moderate assist (50% patient effort);2 person assist required       Goal: Transfer Training Goal 1 LTG- PT  Outcome: Ongoing (interventions implemented as appropriate)    11/06/17 1137   Transfer Training PT LTG   Transfer Training PT LTG, Date Established 11/06/17   Transfer Training PT LTG, Time to Achieve 2 wks   Transfer Training PT LTG, Activity Type sit to stand/stand to sit   Transfer Training PT LTG, [place] Level moderate assist (50% patient effort);2 person assist required

## 2017-11-06 NOTE — PLAN OF CARE
Problem: Patient Care Overview (Adult)  Goal: Plan of Care Review  Outcome: Ongoing (interventions implemented as appropriate)    11/06/17 1316   Coping/Psychosocial Response Interventions   Plan Of Care Reviewed With patient   Outcome Evaluation   Outcome Summary/Follow up Plan OT initial evaluation completed. Pt presents with decreased independence with ADL's and mobility. Pt will need lift and bariatric chair for OOB. Recommend extended care for placement at d/c.         Problem: Inpatient Occupational Therapy  Goal: Bed Mobility Goal LTG- OT  Outcome: Ongoing (interventions implemented as appropriate)    11/06/17 1316   Bed Mobility OT LTG   Bed Mobility OT LTG, Date Established 11/06/17   Bed Mobility OT LTG, Time to Achieve 1 wk   Bed Mobility OT LTG, Activity Type all bed mobility   Bed Mobility OT LTG, [place] Level maximum assist (25% patient effort);2 person assist required       Goal: Transfer Training Goal 1 LTG- OT  Outcome: Ongoing (interventions implemented as appropriate)    11/06/17 1316   Transfer Training OT LTG   Transfer Training OT LTG, Date Established 11/06/17   Transfer Training OT LTG, Time to Achieve 1 wk   Transfer Training OT LTG, Activity Type sit to stand/stand to sit   Transfer Training OT LTG, [place] Level maximum assist (25% patient effort);2 person assist required   Transfer Training OT LTG, Assist Device other (see comments)  (with appropriate AD)       Goal: Strength Goal LTG- OT  Outcome: Ongoing (interventions implemented as appropriate)    11/06/17 1316   Strength OT LTG   Strength Goal OT LTG, Date Established 11/06/17   Strength Goal OT LTG, Time to Achieve 1 wk   Strength Goal OT LTG, Functional Goal Pt to increase B UE strength by 1/2 muscle grade to support ADL independence.       Goal: Grooming Goal LTG- OT  Outcome: Ongoing (interventions implemented as appropriate)    11/06/17 1316   Grooming OT LTG   Grooming Goal OT LTG, Date Established 11/06/17    Grooming Goal OT LTG, Time to Achieve 1 wk   Grooming Goal OT LTG, Activity Type simple grooming   Grooming Goal OT LTG, [place] Level set up required

## 2017-11-06 NOTE — PLAN OF CARE
Problem: Infection, Risk/Actual (Adult)  Goal: Identify Related Risk Factors and Signs and Symptoms  Outcome: Ongoing (interventions implemented as appropriate)    11/04/17 0604   Infection, Risk/Actual   Infection, Risk/Actual: Related Risk Factors chronic illness/condition;poor personal hygiene;malnutrition;skin integrity impairment       Goal: Infection Prevention/Resolution  Outcome: Ongoing (interventions implemented as appropriate)    Problem: Patient Care Overview (Adult)  Goal: Plan of Care Review  Outcome: Ongoing (interventions implemented as appropriate)  Goal: Adult Individualization and Mutuality  Outcome: Ongoing (interventions implemented as appropriate)  Goal: Discharge Needs Assessment  Outcome: Ongoing (interventions implemented as appropriate)    Problem: Fall Risk (Adult)  Goal: Identify Related Risk Factors and Signs and Symptoms  Outcome: Ongoing (interventions implemented as appropriate)  Goal: Absence of Falls  Outcome: Ongoing (interventions implemented as appropriate)    Problem: Skin Integrity Impairment, Risk/Actual (Adult)  Goal: Identify Related Risk Factors and Signs and Symptoms  Outcome: Ongoing (interventions implemented as appropriate)  Goal: Skin Integrity/Wound Healing  Outcome: Ongoing (interventions implemented as appropriate)    Problem: Pressure Ulcer Risk (Saurav Scale) (Adult,Obstetrics,Pediatric)  Goal: Identify Related Risk Factors and Signs and Symptoms  Outcome: Ongoing (interventions implemented as appropriate)

## 2017-11-06 NOTE — PROGRESS NOTES
Discharge Planning Assessment  [facility]     Patient Name: Joy Bueno  MRN: [id number]  Today's Date: 11/6/2017    Admit Date: 11/3/2017          Discharge Needs Assessment       11/06/17 1236    Living Environment    Lives With alone    Living Arrangements house    Home Accessibility no concerns    Transportation Available car;family or friend will provide    Living Environment    Provides Primary Care For no one    Quality Of Family Relationships supportive    Discharge Needs Assessment    Equipment Currently Used at Home hospital bed;lift device            Discharge Plan       11/06/17 1236    Case Management/Social Work Plan    Plan TBD    Patient/Family In Agreement With Plan yes    Additional Comments SW spoke with pt at bedside. Pt reports she came from home but has been to rehab and was not home long. Pt reports she would really llike to go home with home health and does not really want to go back to a facility. Pt reports she wants to discuss options with her daughter and then let SW know. SW also spoke with pt's daughter as pt reports SW can speak with her daughter. TREVOR called Tessie, pt's daughter. Tessie reports pt had been at [facility] and was just discharged home with home health and a hospital bed and lift. Tessie reports the lift device broke as it was not a bariatric lift. tessie also reports pt had a hospital bed from [facility] but then refused it. Tessie reports her and her boyfriend help pt out a lot but Tessie works full time and cannot be there all of the time. TREVOR discussed options for home health as well as private pay sitters, rehab and long term care. Tessie is going to speak with pt and they will let SW know the decision.     Final Note    Final Note SW is following        Discharge Placement     No information found                Demographic Summary       11/06/17 1222    Referral Information    Reason For Consult discharge planning            Functional Status       11/06/17 1235     Functional Status Prior    Ambulation 3-->assistive equipment and person    Transferring 3-->assistive equipment and person    Toileting 3-->assistive equipment and person    Bathing 3-->assistive equipment and person    Dressing 3-->assistive equipment and person    Eating 0-->independent    IADL    Medications independent    Meal Preparation assistive person    Housekeeping assistive person    Laundry assistive person    Shopping assistive person    Oral Care independent            Psychosocial     None            Abuse/Neglect     None            Legal     None            Substance Abuse     None            Patient Forms     None          ROGELIO Maloney

## 2017-11-06 NOTE — THERAPY EVALUATION
Acute Care - Occupational Therapy Initial Evaluation   [place]     Patient Name: Joy Bueno  : 1950  MRN: [id number]  Today's Date: 2017  Onset of Illness/Injury or Date of Surgery Date: 17  Date of Referral to OT: 17  Referring Physician: SUELLEN Dodge    Admit Date: 11/3/2017       ICD-10-CM ICD-9-CM   1. Abdominal pain, unspecified abdominal location R10.9 789.00   2. Diarrhea, unspecified type R19.7 787.91   3. Physical deconditioning R53.81 799.3   4. Impaired functional mobility, balance, gait, and endurance Z74.09 V49.89   5. Impaired mobility and ADLs Z74.09 799.89     Patient Active Problem List   Diagnosis   • Asthma   • Diabetes mellitus   • Hypertension   • Symptomatic anemia   • Morbid obesity   • Anxiety   • Anemia   • CKD (chronic kidney disease) Stage 3   • Gastritis   • Pleural effusion, right   • Possible pneumonia of RML/RLL   • Clostridium difficile diarrhea   • Urinary tract infection associated with indwelling urethral catheter   • Bilateral leg pain   • Generalized weakness   • Abdominal pain   • Diabetes mellitus type 2, insulin dependent   • Chronic heart failure     Past Medical History:   Diagnosis Date   • Anemia    • Anxiety    • Asthma    • Diabetes mellitus    • Hypertension    • Morbid obesity    • Osteoarthritis      Past Surgical History:   Procedure Laterality Date   •  SECTION     • COLONOSCOPY N/A 2017    Procedure: COLONOSCOPY;  Surgeon: Mark I Brunner, MD;  Location:  [facility];  Service:    • ENDOSCOPY N/A 2017    Procedure: ESOPHAGOGASTRODUODENOSCOPY ;  Surgeon: Velasquez Call MD;  Location:  [facility];  Service:    • HERNIA REPAIR            OT ASSESSMENT FLOWSHEET (last 72 hours)      OT Evaluation       17 1236 17 1235 17 1019 17 1016 17 1515    Rehab Evaluation    Document Type   evaluation  -KM evaluation  -JR     Subjective Information   agree to therapy  -KM no  complaints;agree to therapy  -JR     Patient Effort, Rehab Treatment   adequate  -KM adequate  -JR     Symptoms Noted During/After Treatment    none  -JR     General Information    Patient Profile Review   yes  -KM yes  -JR     Onset of Illness/Injury or Date of Surgery Date   11/03/17  -KM 11/03/17  -JR     Referring Physician   SUELLEN Dodge  -SUELLEN Cisneros  -     Pertinent History Of Current Problem   Admitted with abdominal pain and diarrhea, + UTI and C diff. Recent [facility] admit 10/4-10/11 and then to SNF for rehab. Recently d/c'd home   -KM Pt with recent admit for UTI. Pt d/c'd to rehab center and subsequently home despite requiring significant assist to get OOB and complete ADL's. Pt now admitted with abdominal pain and diarrhea.  -JR     Precautions/Limitations   --   morbidly obese  -KM fall precautions;oxygen therapy device and L/min   spore, morbid obesity  -JR     Prior Level of Function   dependent:;gait;transfer;ADL's   sleeping in recliner  -KM dependent:;gait;transfer;mod assist:;dressing;bathing  -JR     Equipment Currently Used at Home hospital bed;lift device  -BG  walker, standard;power chair, (recliner lift);wheelchair  -KM oxygen;wheelchair;walker, standard;power chair, (recliner lift)  -JR     Plans/Goals Discussed With    patient;agreed upon  -JR     Risks Reviewed    patient:;increased discomfort  -JR     Benefits Reviewed    patient:;improve function;increase independence  -JR     Barriers to Rehab   previous functional deficit   working on sts at rehab, not mobile at d/c home  -KM previous functional deficit  -JR     Living Environment    Lives With alone  -BG  alone  -KM alone  -JR     Living Arrangements house  -BG  house  -KM house  -JR     Home Accessibility no concerns  -BG  no concerns  -KM no concerns  -JR     Transportation Available car;family or friend will provide  -BG    ambulance  -KB    Living Environment Comment   --   grandtrs assisting to clean in chair, could not get  up  -KM Pt reports working on sit to stand at rehab center prior to d/c home. Pt reports sleeping in her recliner at home and could complete UB ADL's, but required assist for LB ADL's prior to admit.  -JR     Clinical Impression    Date of Referral to OT    11/04/17  -JR     OT Diagnosis    Decreased independence with ADL's and mobility  -JR     Patient/Family Goals Statement    Return home  -JR     Criteria for Skilled Therapeutic Interventions Met    yes;treatment indicated  -JR     Rehab Potential    fair, will monitor progress closely  -JR     Therapy Frequency    daily  -JR     Anticipated Equipment Needs At Discharge    bedside commode;hospital bed;tub bench  -JR     Anticipated Discharge Disposition    extended care facility  -JR     Functional Level Prior    Ambulation  3-->assistive equipment and person  -BG       Transferring  3-->assistive equipment and person  -BG       Toileting  3-->assistive equipment and person  -BG       Bathing  3-->assistive equipment and person  -BG       Dressing  3-->assistive equipment and person  -BG       Eating  0-->independent  -BG       Vital Signs    Pre Systolic BP Rehab    148  -JR     Pre Treatment Diastolic BP    70  -JR     Post Systolic BP Rehab    165  -JR     Post Treatment Diastolic BP    74  -JR     Pretreatment Heart Rate (beats/min)    80  -JR     Posttreatment Heart Rate (beats/min)    77  -JR     Pre Patient Position    Supine  -JR     Intra Patient Position    Supine  -JR     Post Patient Position    Supine  -JR     Pain Assessment    Pain Assessment   No/denies pain  -KM 0-10  -JR     Pain Score    0  -JR     Post Pain Score    0  -JR     Cognitive Assessment/Intervention    Current Cognitive/Communication Assessment   functional  -KM functional  -JR     Orientation Status   oriented x 4  -KM oriented x 4  -JR     Follows Commands/Answers Questions   able to follow single-step instructions;100% of the time  -% of the time;able to follow single-step  instructions  -JR     Personal Safety   WNL/WFL  -KM mild impairment;decreased awareness, need for assist;decreased awareness, need for safety;decreased insight to deficits  -JR     Personal Safety Interventions   fall prevention program maintained  -KM      ROM (Range of Motion)    General ROM   lower extremity range of motion deficits identified   l/e end range limitations due to obesity  -KM no range of motion deficits identified  -JR     MMT (Manual Muscle Testing)    General MMT Assessment   lower extremity strength deficits identified   3-/5 grossly b l/es  -KM upper extremity strength deficits identified  -JR     General MMT Assessment Detail    B UE functionally 4-/5  -JR     Bed Mobility, Assessment/Treatment    Bed Mobility, Assistive Device   bed rails;draw sheet  -KM bed rails;draw sheet  -JR     Bed Mobility, Roll Left, [place]   dependent (less than 25% patient effort)  -KM dependent (less than 25% patient effort);2 person assist required  -JR     Bed Mobility, Roll Right, [place]   dependent (less than 25% patient effort)  -KM dependent (less than 25% patient effort);2 person assist required  -JR     Bed Mobility, Safety Issues   decreased use of arms for pushing/pulling;decreased use of legs for bridging/pushing  -KM decreased use of arms for pushing/pulling;decreased use of legs for bridging/pushing  -JR     Bed Mobility, Impairments    decreased flexibility;strength decreased  -JR     Bed Mobility, Comment   --   can reach for rails, cannot rotate trunk or hips  -KM Pt able to reach for bedrails, unable to rotate trunk or hips for bed mobility  -JR     Transfer Assessment/Treatment    Transfers, Bed-Chair [place]   not appropriate to assess   pt requires lift use, no bariatric chair in room  -KM not appropriate to assess  -JR     Functional Mobility    Functional Mobility- Ind. Level    not appropriate to assess  -JR     Sensory Assessment/Intervention    Light Touch    LUE;RUE  -JR      LUE Light Touch    WNL  -JR     RUE Light Touch    WNL  -JR     General Therapy Interventions    Planned Therapy Interventions    ADL retraining;bed mobility training;strengthening;adaptive equipment training;transfer training  -JR     Positioning and Restraints    Pre-Treatment Position   in bed  -KM in bed  -JR     Post Treatment Position   bed  -KM bed  -JR     In Bed   notified nsg;supine;call light within reach;encouraged to call for assist  -KM notified nsg;supine;call light within reach;encouraged to call for assist  -JR       11/04/17 1500                General Information    Equipment Currently Used at Home wheelchair;oxygen;walker, standard;glucometer  -TH        Living Environment    Lives With alone  -TH        Living Arrangements house  -TH        Home Accessibility other (see comments)   unable to care for self at   -TH        Stair Railings at Home none  -TH        Type of Financial/Environmental Concern none  -TH        Transportation Available family or friend will provide;car  -TH        Functional Level Prior    Ambulation 4-->completely dependent  -TH        Transferring 4-->completely dependent  -TH        Toileting 4-->completely dependent  -TH        Bathing 4-->completely dependent  -TH        Dressing 4-->completely dependent  -TH        Eating 0-->independent  -TH        Communication 0-->understands/communicates without difficulty  -TH        Swallowing 0-->swallows foods/liquids without difficulty  -TH        Prior Functional Level Comment unable to walk at current time  -TH          User Key  (r) = Recorded By, (t) = Taken By, (c) = Cosigned By    Initials Name Effective Dates     Siobhan Miller, PT 06/19/15 -     JR Danii Mendoza, OT 06/22/15 -     KB Wilbert Ortiz, RN 06/16/16 -      Reina Lester, MSW 07/18/16 -     TH Rosa Isela Schulte, ADILSON 06/16/16 -            Occupational Therapy Education     Title: PT OT SLP Therapies (Active)     Topic: Occupational Therapy  (Active)     Point: ADL training (Active)    Description: Instruct learner(s) on proper safety adaptation and remediation techniques during self care or transfers.   Instruct in proper use of assistive devices.    Learning Progress Summary    Learner Readiness Method Response Comment Documented by Status   Patient Acceptance E NR Educated pt on role of therapy and benefits of activity.  11/06/17 1315 Active                      User Key     Initials Effective Dates Name Provider Type Discipline     06/22/15 -  Danii Mendoza, OT Occupational Therapist OT                  OT Recommendation and Plan  Anticipated Equipment Needs At Discharge: bedside commode, hospital bed, tub bench  Anticipated Discharge Disposition: extended care facility  Planned Therapy Interventions: ADL retraining, bed mobility training, strengthening, adaptive equipment training, transfer training  Therapy Frequency: daily  Plan of Care Review  Plan Of Care Reviewed With: patient  Outcome Summary/Follow up Plan: OT initial evaluation completed. Pt presents with decreased independence with ADL's and mobility. Pt will need lift and bariatric chair for OOB. Recommend extended care for placement at d/c.          OT Goals       11/06/17 1316          Bed Mobility OT LTG    Bed Mobility OT LTG, Date Established 11/06/17  -      Bed Mobility OT LTG, Time to Achieve 1 wk  -JR      Bed Mobility OT LTG, Activity Type all bed mobility  -      Bed Mobility OT LTG, [place] Level maximum assist (25% patient effort);2 person assist required  -JR      Transfer Training OT LTG    Transfer Training OT LTG, Date Established 11/06/17  -      Transfer Training OT LTG, Time to Achieve 1 wk  -JR      Transfer Training OT LTG, Activity Type sit to stand/stand to sit  -JR      Transfer Training OT LTG, [place] Level maximum assist (25% patient effort);2 person assist required  -JR      Transfer Training OT LTG, Assist Device other (see comments)    with appropriate AD  -JR      Strength OT LTG    Strength Goal OT LTG, Date Established 11/06/17  -JR      Strength Goal OT LTG, Time to Achieve 1 wk  -JR      Strength Goal OT LTG, Functional Goal Pt to increase B UE strength by 1/2 muscle grade to support ADL independence.  -JR      Grooming OT LTG    Grooming Goal OT LTG, Date Established 11/06/17  -JR      Grooming Goal OT LTG, Time to Achieve 1 wk  -JR      Grooming Goal OT LTG, Activity Type simple grooming  -JR      Grooming Goal OT LTG, [place] Level set up required  -JR        User Key  (r) = Recorded By, (t) = Taken By, (c) = Cosigned By    Initials Name Provider Type    JR Danii Mendoza, OT Occupational Therapist                Outcome Measures       11/06/17 1019 11/06/17 1016       How much help from another person do you currently need...    Turning from your back to your side while in flat bed without using bedrails? 1  -KM      Moving from lying on back to sitting on the side of a flat bed without bedrails? 1  -KM      Moving to and from a bed to a chair (including a wheelchair)? 1  -KM      Standing up from a chair using your arms (e.g., wheelchair, bedside chair)? 1  -KM      Climbing 3-5 steps with a railing? 1  -KM      To walk in hospital room? 1  -KM      AM-PAC 6 Clicks Score 6  -KM      How much help from another is currently needed...    Putting on and taking off regular lower body clothing?  1  -JR     Bathing (including washing, rinsing, and drying)  1  -JR     Toileting (which includes using toilet bed pan or urinal)  1  -JR     Putting on and taking off regular upper body clothing  2  -JR     Taking care of personal grooming (such as brushing teeth)  2  -JR     Eating meals  3  -JR     Score  10  -JR     Functional Assessment    Outcome Measure Options AM-PAC 6 Clicks Basic Mobility (PT)  -KM AM-PAC 6 Clicks Daily Activity (OT)  -JR       User Key  (r) = Recorded By, (t) = Taken By, (c) = Cosigned By    Initials Name Provider  Type    SERGEI Miller, PT Physical Therapist    JR Danii Mendoza, OT Occupational Therapist          Time Calculation:   OT Start Time: 1016    Therapy Charges for Today     Code Description Service Date Service Provider Modifiers Qty    13361540197 HC OT EVAL LOW COMPLEXITY 4 11/6/2017 Danii Mendoza, OT GO 1    07141434240 HC OT SELFCARE CURRENT 11/6/2017 Danii Mendoza, OT GO, CL 1    33886015472 HC OT SELFCARE PROJECTED 11/6/2017 Danii Mendoza, OT GO, CK 1          OT G-codes  OT Functional Scales Options: AM-PAC 6 Clicks Daily Activity (OT)  Functional Limitation: Self care  Self Care Current Status (): At least 60 percent but less than 80 percent impaired, limited or restricted  Self Care Goal Status (): At least 40 percent but less than 60 percent impaired, limited or restricted    Danii Mendoza, OT  11/6/2017

## 2017-11-07 LAB
ANION GAP SERPL CALCULATED.3IONS-SCNC: 6 MMOL/L (ref 3–11)
BUN BLD-MCNC: 50 MG/DL (ref 9–23)
BUN/CREAT SERPL: 20 (ref 7–25)
CALCIUM SPEC-SCNC: 7.7 MG/DL (ref 8.7–10.4)
CHLORIDE SERPL-SCNC: 107 MMOL/L (ref 99–109)
CO2 SERPL-SCNC: 29 MMOL/L (ref 20–31)
CREAT BLD-MCNC: 2.5 MG/DL (ref 0.6–1.3)
GFR SERPL CREATININE-BSD FRML MDRD: 23 ML/MIN/1.73
GLUCOSE BLD-MCNC: 113 MG/DL (ref 70–100)
GLUCOSE BLDC GLUCOMTR-MCNC: 132 MG/DL (ref 70–130)
GLUCOSE BLDC GLUCOMTR-MCNC: 132 MG/DL (ref 70–130)
GLUCOSE BLDC GLUCOMTR-MCNC: 140 MG/DL (ref 70–130)
GLUCOSE BLDC GLUCOMTR-MCNC: 191 MG/DL (ref 70–130)
POTASSIUM BLD-SCNC: 3.8 MMOL/L (ref 3.5–5.5)
SODIUM BLD-SCNC: 142 MMOL/L (ref 132–146)

## 2017-11-07 PROCEDURE — 25010000002 HEPARIN (PORCINE) PER 1000 UNITS: Performed by: NURSE PRACTITIONER

## 2017-11-07 PROCEDURE — 94799 UNLISTED PULMONARY SVC/PX: CPT

## 2017-11-07 PROCEDURE — 94760 N-INVAS EAR/PLS OXIMETRY 1: CPT

## 2017-11-07 PROCEDURE — 80048 BASIC METABOLIC PNL TOTAL CA: CPT | Performed by: HOSPITALIST

## 2017-11-07 PROCEDURE — 82962 GLUCOSE BLOOD TEST: CPT

## 2017-11-07 RX ORDER — SODIUM CHLORIDE 9 MG/ML
75 INJECTION, SOLUTION INTRAVENOUS CONTINUOUS
Status: DISCONTINUED | OUTPATIENT
Start: 2017-11-07 | End: 2017-11-08

## 2017-11-07 RX ADMIN — FUROSEMIDE 40 MG: 40 TABLET ORAL at 08:30

## 2017-11-07 RX ADMIN — HEPARIN SODIUM 5000 UNITS: 5000 INJECTION, SOLUTION INTRAVENOUS; SUBCUTANEOUS at 08:31

## 2017-11-07 RX ADMIN — CASTOR OIL AND BALSAM, PERU: 788; 87 OINTMENT TOPICAL at 21:00

## 2017-11-07 RX ADMIN — POTASSIUM CHLORIDE 40 MEQ: 750 CAPSULE, EXTENDED RELEASE ORAL at 01:11

## 2017-11-07 RX ADMIN — SODIUM CHLORIDE 75 ML/HR: 9 INJECTION, SOLUTION INTRAVENOUS at 15:45

## 2017-11-07 RX ADMIN — ESCITALOPRAM OXALATE 10 MG: 10 TABLET ORAL at 08:30

## 2017-11-07 RX ADMIN — HYDRALAZINE HYDROCHLORIDE 100 MG: 25 TABLET ORAL at 21:14

## 2017-11-07 RX ADMIN — FUROSEMIDE 40 MG: 40 TABLET ORAL at 17:32

## 2017-11-07 RX ADMIN — Medication 3 TABLET: at 06:59

## 2017-11-07 RX ADMIN — MULTIPLE VITAMINS W/ MINERALS TAB 1 TABLET: TAB ORAL at 08:30

## 2017-11-07 RX ADMIN — BUDESONIDE AND FORMOTEROL FUMARATE DIHYDRATE 2 PUFF: 160; 4.5 AEROSOL RESPIRATORY (INHALATION) at 21:17

## 2017-11-07 RX ADMIN — HYDRALAZINE HYDROCHLORIDE 100 MG: 25 TABLET ORAL at 08:31

## 2017-11-07 RX ADMIN — AMLODIPINE BESYLATE 10 MG: 10 TABLET ORAL at 08:31

## 2017-11-07 RX ADMIN — ISOSORBIDE MONONITRATE 60 MG: 60 TABLET, EXTENDED RELEASE ORAL at 08:31

## 2017-11-07 RX ADMIN — HEPARIN SODIUM 5000 UNITS: 5000 INJECTION, SOLUTION INTRAVENOUS; SUBCUTANEOUS at 21:13

## 2017-11-07 RX ADMIN — CLONAZEPAM 0.25 MG: 0.5 TABLET ORAL at 21:28

## 2017-11-07 RX ADMIN — Medication 250 MG: at 17:32

## 2017-11-07 RX ADMIN — BUDESONIDE AND FORMOTEROL FUMARATE DIHYDRATE 2 PUFF: 160; 4.5 AEROSOL RESPIRATORY (INHALATION) at 07:20

## 2017-11-07 RX ADMIN — HYDROCODONE BITARTATE AND ACETAMINOPHEN 1 TABLET: 5; 325 TABLET ORAL at 13:03

## 2017-11-07 RX ADMIN — HYDROCODONE BITARTATE AND ACETAMINOPHEN 1 TABLET: 5; 325 TABLET ORAL at 01:10

## 2017-11-07 RX ADMIN — Medication 250 MG: at 08:30

## 2017-11-07 RX ADMIN — MICONAZOLE NITRATE: 20 POWDER TOPICAL at 08:32

## 2017-11-07 RX ADMIN — CASTOR OIL AND BALSAM, PERU: 788; 87 OINTMENT TOPICAL at 08:31

## 2017-11-07 RX ADMIN — INSULIN LISPRO 2 UNITS: 100 INJECTION, SOLUTION INTRAVENOUS; SUBCUTANEOUS at 21:13

## 2017-11-07 RX ADMIN — IPRATROPIUM BROMIDE AND ALBUTEROL SULFATE 3 ML: .5; 3 SOLUTION RESPIRATORY (INHALATION) at 07:19

## 2017-11-07 RX ADMIN — ATORVASTATIN CALCIUM 10 MG: 10 TABLET, FILM COATED ORAL at 21:13

## 2017-11-07 RX ADMIN — IPRATROPIUM BROMIDE AND ALBUTEROL SULFATE 3 ML: .5; 3 SOLUTION RESPIRATORY (INHALATION) at 21:17

## 2017-11-07 RX ADMIN — MICONAZOLE NITRATE: 20 POWDER TOPICAL at 21:00

## 2017-11-07 RX ADMIN — Medication 3 TABLET: at 10:57

## 2017-11-07 RX ADMIN — HYDRALAZINE HYDROCHLORIDE 100 MG: 25 TABLET ORAL at 15:41

## 2017-11-07 NOTE — PLAN OF CARE
Problem: Infection, Risk/Actual (Adult)  Goal: Identify Related Risk Factors and Signs and Symptoms  Outcome: Ongoing (interventions implemented as appropriate)  Goal: Infection Prevention/Resolution  Outcome: Ongoing (interventions implemented as appropriate)    Problem: Fall Risk (Adult)  Goal: Identify Related Risk Factors and Signs and Symptoms  Outcome: Ongoing (interventions implemented as appropriate)  Goal: Absence of Falls  Outcome: Ongoing (interventions implemented as appropriate)

## 2017-11-07 NOTE — PROGRESS NOTES
[facility]  PROGRESS NOTE    Patient Name: Joy Bueno  : 1950  MRN: [id number]    Date of Admission: 11/3/2017  Length of Stay: 0  Primary Care Physician: Trevon Delarosa MD    Subjective   Subjective     CC:  No new, only stating that she does not feel well.    HPI:  No acute events O/N. No new complaints. Afebrile.    Review of Systems  Otherwise ROS is negative except as mentioned in the HPI.    Objective   Objective     Vital Signs:   Temp:  [97.5 °F (36.4 °C)-98.5 °F (36.9 °C)] 98.4 °F (36.9 °C)  Heart Rate:  [73-89] 81  Resp:  [16-22] 22  BP: (148-183)/(66-89) 164/79        Physical Exam:  Constitutional: No acute distress, awake, alert  Eyes: PERRLA, sclerae anicteric, no conjunctival injection  HENT: NCAT, mucous membranes moist  Neck: Supple, trachea midline  Respiratory: Clear to auscultation bilaterally, mildly decreased at bases,  nonlabored respirations   Cardiovascular: RRR, no murmurs, rubs, or gallops  Gastrointestinal: Obese, Positive bowel sounds, soft, nontender, nondistended  Musculoskeletal: 2+ LE edema, no clubbing or cyanosis to extremities  Psychiatric: Appropriate affect, cooperative  Neurologic: Oriented x 3,  No facial asymmetry, speech clear  Skin: No rashes    Results Reviewed:  I have personally reviewed current lab, radiology, and data and agree.      Results from last 7 days  Lab Units 17  0025   WBC 10*3/mm3 12.45*   HEMOGLOBIN g/dL 9.7*   HEMATOCRIT % 31.4*   PLATELETS 10*3/mm3 277       Results from last 7 days  Lab Units 17  0648 17  0026   SODIUM mmol/L 143 145   POTASSIUM mmol/L 3.1* 3.2*   CHLORIDE mmol/L 108 108   CO2 mmol/L 28.0 29.0   BUN mg/dL 47* 41*   CREATININE mg/dL 2.40* 2.10*   GLUCOSE mg/dL 94 152*   CALCIUM mg/dL 7.8* 8.7   ALT (SGPT) U/L  --  13   AST (SGOT) U/L  --  16     No results found for: BNP  No results found for: PHART    Microbiology Results Abnormal     Procedure Component  Value - Date/Time    Urine Culture - Urine, Urine, Clean Catch [[id number]]  (Normal) Collected:  11/04/17 0215    Lab Status:  Final result Specimen:  Urine from Urine, Catheter Updated:  11/06/17 0808     Urine Culture No growth at 2 days    Clostridium Difficile Toxin - Stool, Per Rectum [[id number]] Collected:  11/04/17 0819    Lab Status:  Final result Specimen:  Stool from Per Rectum Updated:  11/04/17 1052    Narrative:       The following orders were created for panel order Clostridium Difficile Toxin - Stool, Per Rectum.  Procedure                               Abnormality         Status                     ---------                               -----------         ------                     Clostridium Difficile To...[[id number]]  Normal              Final result                 Please view results for these tests on the individual orders.    Clostridium Difficile Toxin, PCR - Stool, Per Rectum [[id number]]  (Normal) Collected:  11/04/17 0819    Lab Status:  Final result Specimen:  Stool from Per Rectum Updated:  11/04/17 1052     C. Difficile Toxins by PCR Not Detected    Narrative:         Performance characteristics of test not established for patients <2 years of age.  Negative for Toxigenic C. Difficile          Imaging Results (last 24 hours)     ** No results found for the last 24 hours. **        Results for orders placed during the hospital encounter of 09/02/17   Adult Transthoracic Echo Complete    Narrative · Left ventricular wall thickness is consistent with mild concentric   hypertrophy.  · Left atrial cavity size is mildly dilated.  · Mild mitral valve regurgitation is present  · calcification of the aortic valve  · Mild tricuspid valve regurgitation is present.          I have reviewed the medications.    Assessment/Plan   Assessment / Plan     Hospital Problem List     * (Principal)Abdominal pain    Asthma    Hypertension    Morbid obesity    Anxiety    Anemia    Overview Addendum 8/28/2017  11:03 AM by Piyush Stokes MD     Iron deficient         CKD (chronic kidney disease) Stage 3    Overview Signed 8/28/2017 11:03 AM by Piyush Stokes MD     Followed by nephrology associates (improving)         Generalized weakness    Diabetes mellitus type 2, insulin dependent (Chronic)    Chronic heart failure (Chronic)        Brief Hospital Course to date:  66 yo F with history of recent CAUTI and C diff infection, recently finished PO Vanc course, sent to rehab, and recently discharged from rehab, been home few days, and brought back due to abd pain with diarrhea, but no BM recorded here after 2 days of hospitalization. No fever or chills. CT C/A/P showed moderate stool retention, as well as pleural effusion, which has improved when compared to previous exam. Still very weak and requires significant assistance with ADL.        Assessment & Plan:  HTN  - suboptimal at this time  - adjusting meds     Asthma  - no exac     Urinary retention/history of CAUTI  - etiology unclear, appreciate pharm assistance with reviewing poss medication culprit  - repeat UA neg     Recent C diff infection/abd pain/stool retention  - no diarrhea here, but pt reported she had some at home  - complete 14 day course of PO Vanc, will not repeat lab  - Bowel care PRN     DM2  - A1C 5.4, poss poor prognostic indicator vs false low due to chronic anemia  - SSI     Anxiety  - SSRI     Chronic DHF  - Creatinine bumped, so hold diuretic for now  - monitor strict I&O     CKD  - base creatinine 1.5-2.2  - monitor     Anemia  - chronic and stable     Morbid obesity  - multifactorial     Gen weakness/deconditioning  - multifactorial  - PT/OT consulted  - requires assistance with ADL  - will need CM/SW's assistance, not sure if daughter will be able to care for pt.      DVT Prophylaxis:  Hep sq     CODE STATUS: Full Code     Disposition: TBD, planning for home. Pt stated initially that she would want to go back to rehab, but now wants to  go home. She is medically stable, but has chronic problems that are also stable. Her main issue is mobility. She is unable to care for herself and would require assistance with ADL. CM has discussed with dtr and pt, DC planning in progress. Hopefully can DC home in tthe next 1-2 days with HH.    Matthew Ackerman MD  11/06/17  7:12 PM

## 2017-11-07 NOTE — PROGRESS NOTES
[facility] Medicine Services  PROGRESS NOTE    Patient Name: Joy Bueno  : 1950  MRN: [id number]    Date of Admission: 11/3/2017  Length of Stay: 0  Primary Care Physician: Trevon Delarosa MD    Subjective   Subjective     CC:  Abd pain    HPI:  C/o abd pain, no N/V. No diarrhea/no BM. No CP or SOA. No fever or chills.    Review of Systems  Otherwise ROS is negative except as mentioned in the HPI.    Objective   Objective     Vital Signs:   Temp:  [98.2 °F (36.8 °C)-98.6 °F (37 °C)] 98.2 °F (36.8 °C)  Heart Rate:  [74-84] 83  Resp:  [16-18] 18  BP: (136-167)/(66-93) 167/93        Physical Exam:  Constitutional: No acute distress, awake, alert  Eyes: PERRLA, sclerae anicteric, no conjunctival injection  HENT: NCAT, mucous membranes moist  Neck: Supple, trachea midline  Respiratory: Clear to auscultation bilaterally, mildly decreased at bases,  nonlabored respirations   Cardiovascular: RRR, no murmurs, rubs, or gallops  Gastrointestinal: Obese, Positive bowel sounds, soft, nontender, nondistended  Musculoskeletal: Trace LE edema, no clubbing or cyanosis to extremities  Psychiatric: Appropriate affect, cooperative  Neurologic: Oriented x 3,  No facial asymmetry, speech clear  Skin: No rashes    Results Reviewed:  I have personally reviewed current lab, radiology, and data and agree.      Results from last 7 days  Lab Units 17  0025   WBC 10*3/mm3 12.45*   HEMOGLOBIN g/dL 9.7*   HEMATOCRIT % 31.4*   PLATELETS 10*3/mm3 277       Results from last 7 days  Lab Units 17  0505 17  0648 17  0026   SODIUM mmol/L 142 143 145   POTASSIUM mmol/L 3.8 3.1* 3.2*   CHLORIDE mmol/L 107 108 108   CO2 mmol/L 29.0 28.0 29.0   BUN mg/dL 50* 47* 41*   CREATININE mg/dL 2.50* 2.40* 2.10*   GLUCOSE mg/dL 113* 94 152*   CALCIUM mg/dL 7.7* 7.8* 8.7   ALT (SGPT) U/L  --   --  13   AST (SGOT) U/L  --   --  16     No results found for: BNP  No results found for:  PHART    Microbiology Results Abnormal     Procedure Component Value - Date/Time    Urine Culture - Urine, Urine, Clean Catch [[id number]]  (Normal) Collected:  11/04/17 0215    Lab Status:  Final result Specimen:  Urine from Urine, Catheter Updated:  11/06/17 0808     Urine Culture No growth at 2 days    Clostridium Difficile Toxin - Stool, Per Rectum [[id number]] Collected:  11/04/17 0819    Lab Status:  Final result Specimen:  Stool from Per Rectum Updated:  11/04/17 1052    Narrative:       The following orders were created for panel order Clostridium Difficile Toxin - Stool, Per Rectum.  Procedure                               Abnormality         Status                     ---------                               -----------         ------                     Clostridium Difficile To...[[id number]]  Normal              Final result                 Please view results for these tests on the individual orders.    Clostridium Difficile Toxin, PCR - Stool, Per Rectum [[id number]]  (Normal) Collected:  11/04/17 0819    Lab Status:  Final result Specimen:  Stool from Per Rectum Updated:  11/04/17 1052     C. Difficile Toxins by PCR Not Detected    Narrative:         Performance characteristics of test not established for patients <2 years of age.  Negative for Toxigenic C. Difficile          Imaging Results (last 24 hours)     ** No results found for the last 24 hours. **        Results for orders placed during the hospital encounter of 09/02/17   Adult Transthoracic Echo Complete    Narrative · Left ventricular wall thickness is consistent with mild concentric   hypertrophy.  · Left atrial cavity size is mildly dilated.  · Mild mitral valve regurgitation is present  · calcification of the aortic valve  · Mild tricuspid valve regurgitation is present.          I have reviewed the medications.    Assessment/Plan   Assessment / Plan     Hospital Problem List     * (Principal)Abdominal pain    Asthma    Hypertension     Morbid obesity    Anxiety    Anemia    Overview Addendum 8/28/2017 11:03 AM by Piyush Stokes MD     Iron deficient         CKD (chronic kidney disease) Stage 3    Overview Signed 8/28/2017 11:03 AM by Piyush Stokes MD     Followed by nephrology associates (improving)         Generalized weakness    Diabetes mellitus type 2, insulin dependent (Chronic)    Chronic heart failure (Chronic)           Brief Hospital Course to date:  68 yo F with history of recent CAUTI and C diff infection, recently finished PO Vanc course, sent to rehab, and recently discharged from rehab, been home few days, and brought back due to abd pain with diarrhea, but no BM recorded here after 2 days of hospitalization. No fever or chills. CT C/A/P showed moderate stool retention, as well as pleural effusion, which has improved when compared to previous exam. Still very weak and requires significant assistance with ADL.          Assessment & Plan:  HTN  - suboptimal at this time, but improved some, poss due to some pain/discomfort  - cont with current meds for now      Asthma  - no exac      Urinary retention/history of CAUTI  - etiology unclear, appreciate pharm assistance with reviewing poss medication culprit  - repeat UA neg      Recent C diff infection/abd pain/stool retention  - no diarrhea here, but pt reported she had some at home  - complete 14 day course of PO Vanc, will not repeat lab  - Bowel care PRN      DM2  - A1C 5.4, poss poor prognostic indicator vs false low due to chronic anemia  - SSI      Anxiety  - SSRI      Chronic DHF  - Creatinine bumped, so hold diuretic for now  - monitor strict I&O      CKD  - base creatinine 1.5-2.2  - slight bump  In creatinine, poss secondary to dehydration and meds SE  - will give gentle IVF and recheck BMP in am      Anemia  - chronic and stable      Morbid obesity  - multifactorial      Gen weakness/deconditioning  - multifactorial  - PT/OT consulted  - requires assistance with  ADL  - will need CM/SW's assistance, CM working on home needs/equipments. SW made APS report for self neglect.      DVT Prophylaxis:  Hep sq      CODE STATUS: Full Code      Disposition: Home tomorrow if BP and creatinine stable. Hopefully medical equipments can be delivered to home prior to patient's discharge tomorrow. She required significant assistance. She assured me that she will be fine and that her daughter and daughter's boyfriend will help her at home.       Matthew Ackerman MD  11/07/17  4:18 PM

## 2017-11-07 NOTE — PROGRESS NOTES
Continued Stay Note  [facility]     Patient Name: Joy Bueno  MRN: [id number]  Today's Date: 11/7/2017    Admit Date: 11/3/2017          Discharge Plan       11/07/17 1001    Case Management/Social Work Plan    Additional Comments Pt also reports she would like to discharge tomorrow as her daughter can be home and help get everything taken care of.      11/07/17 0946    Case Management/Social Work Plan    Additional Comments TREVOR spoke with pt at bedside. Pt reports she would like to go home with home health. SW had spoken with [facility] and pt is a new pt with them and will need a resume HH order. Pt rpeorts she wants to continue [facility] HH and go home. Pt reports she has spoken with her daughter, Tessie about it. Pt reports she has a hospital bed and a lift that was broken and [employer] took it back.. SW will make sure pt can get a lift to accomodate her at home and make sure she still has the hospital bed. Pt also has oxygen at home through [employer] as well.TREVOR called [employer] [phone], and spoke with a supervisor, Onel. Onel reports that the Lift as well as hospital bed was ordered on November 2nd and delivered. Onel reports the lhospital bed is bariatric bed and the lift accomodates pt's up to 450 pounds. Onel reports that on Saturday [employer] was caled by family reporting th elift was broken. Onel reports they went out to take care of it in patient's home and the lift was no broken and Onel even tested it. Onel reports pt's daughter reported it didn't lift pt high enough and Onel reports you cannot get a lift any higher. Pt's family refused to use it and [employer] took it back and now requesting the hospital bed be taken away. Hospital bed still currently in pt's home and if pt agreeable they can redeliver the lift. Pt's family had requested a bigger lift even though the lift they provided accomodates 450 pounds. Onel reports bariatric lifts are pay out of pocket and  they are between $2,000-$4,000. SW will speak with pt and pt's daughter again as Onel reports they can redilver the lift when pt agreeable.               Discharge Codes     None            ROGELIO Maloney

## 2017-11-07 NOTE — PROGRESS NOTES
Continued Stay Note  [facility]     Patient Name: Joy Bueno  MRN: [id number]  Today's Date: 11/7/2017    Admit Date: 11/3/2017          Discharge Plan       11/07/17 1545    Case Management/Social Work Plan    Additional Comments TREVOR faxed resume HH orders to [facility] and called them to let them know pt is discharging tomorrow. TREVOR also made an APS report for self neglect and report number [id number].      11/07/17 1512    Case Management/Social Work Plan    Additional Comments TREVOR spoke with pt at bedside and pt is agreeable to get the lift delivered back to her from [employer]. TREVOR checked with Other DME companies and they report that the gerardo lift for home is pretty standard and will be the same no matter what company used. Pt reports her daughter or someone will be at her home tomorrow but she will need transportation. TREVOR set up an ambulance as pt sometimes uses Wheels but does not have her wheelchair with her to be able transport with wheels. TREVOR set up ambulance for transport home tomorrow at 3:00pm. TREVOR has discussed pt with CM supervisor as pt does not have a caregiver at home and she is dependent with ADL's. TREVOR also discussed with pt again about rehab or long term and pt refuses and wants to go home with home health. TREVOR spoke with pt multiple times today and pt is adamant about going home and reports someone will be at her house to help her when she is discharged. TREVOR called [facility] at [facility] as pt had discharged from their facility before being discharged home with home health. Saloni reports that pt had used all of her Medicare Skilled Days and they had discussed private pay as well as long term/medicaid and pt refused both. Pt continues to refuse and daughter had reported that pt had APS report made on her but APS repotrs pt is in her right mind and able to make own decisions. SW will make another APS report as pt needs more care at home as her daughter works 4 12 hour shifts a week and pt does not have  other support. SW sent resume home health order to [facility] and ambulance arranged for 3:00pm tomorrow. TREVOR also called [employer] and reports pt will be discharging home tomorrow and they will need to bring the [employer] lift back to pt's home. [facility] reports they will get the lift back to pt's home.                Discharge Codes     None            ROGELIO Maloney

## 2017-11-07 NOTE — PLAN OF CARE
Problem: Infection, Risk/Actual (Adult)  Goal: Identify Related Risk Factors and Signs and Symptoms  Outcome: Ongoing (interventions implemented as appropriate)    Problem: Patient Care Overview (Adult)  Goal: Plan of Care Review  Outcome: Ongoing (interventions implemented as appropriate)    Problem: Fall Risk (Adult)  Goal: Identify Related Risk Factors and Signs and Symptoms  Outcome: Ongoing (interventions implemented as appropriate)    Problem: Skin Integrity Impairment, Risk/Actual (Adult)  Goal: Identify Related Risk Factors and Signs and Symptoms  Outcome: Ongoing (interventions implemented as appropriate)    Problem: Pressure Ulcer Risk (Saurav Scale) (Adult,Obstetrics,Pediatric)  Goal: Identify Related Risk Factors and Signs and Symptoms  Outcome: Ongoing (interventions implemented as appropriate)

## 2017-11-07 NOTE — PROGRESS NOTES
Continued Stay Note  [facility]     Patient Name: Joy Bueno  MRN: [id number]  Today's Date: 11/7/2017    Admit Date: 11/3/2017          Discharge Plan       11/07/17 1512    Case Management/Social Work Plan    Additional Comments TREVOR spoke with pt at bedside and pt is agreeable to get the lift delivered back to her from [employer]. TREVOR checked with Other DME companies and they report that the latisha lift for home is pretty standard and will be the same no matter what company used. Pt reports her daughter or someone will be at her home tomorrow but she will need transportation. SW set up an ambulance as pt sometimes uses Wheels but does not have her wheelchair with her to be able transport with wheels. TREVOR set up ambulance for transport home tomorrow at 3:00pm. TREVOR has discussed pt with CM supervisor as pt does not have a caregiver at home and she is dependent with ADL's. TREVOR also discussed with pt again about rehab or long term and pt refuses and wants to go home with home health. TREVOR spoke with pt multiple times today and pt is adamant about going home and reports someone will be at her house to help her when she is discharged. TREVOR called [facility]oni at [facility] as pt had discharged from their facility before being discharged home with home health. Saloni reports that pt had used all of her Medicare Skilled Days and they had discussed private pay as well as long term/medicaid and pt refused both. Pt continues to refuse and daughter had reported that pt had APS report made on her but APS repotrs pt is in her right mind and able to make own decisions. TREVOR will make another APS report as pt needs more care at home as her daughter works 4 12 hour shifts a week and pt does not have other support. TREVOR sent resume home health order to [facility] and ambulance arranged for 3:00pm tomorrow. TREVOR also called [employer] and reports pt will be discharging home tomorrow and they will need to bring the Latisha lift back to pt's home. [facility]  reports they will get the lift back to pt's home.                Discharge Codes     None            ROGELIO Maloney

## 2017-11-07 NOTE — DISCHARGE PLACEMENT REQUEST
"Pam Loyd (67 y.o. Female)     Date of Birth Social Security Number Address Home Phone MRN    1950  [address] [id number] [phone] [id number]    Denominational Marital Status          None        Admission Date Admission Type Admitting Provider Attending Provider Department, Room/Bed    11/3/17 Emergency Matthew Ackerman MD Khamvanthong, Phonekeo, MD [facility] 6B, N624/1    Discharge Date Discharge Disposition Discharge Destination                      Attending Provider: Matthew Ackerman MD     Allergies:  No Known Allergies    Isolation:  Spore   Infection:  C.difficile (10/05/17)   Code Status:  FULL    Ht:  66\" (167.6 cm)   Wt:  336 lb (152 kg)    Admission Cmt:  None   Principal Problem:  Abdominal pain [R10.9]                 Active Insurance as of 11/3/2017     Primary Coverage     Payor Plan Insurance Group Employer/Plan Group    MEDICARE MEDICARE A & B      Payor Plan Address Payor Plan Phone Number Effective From Effective To    [address] [phone] 2016     [place] [address]       Subscriber Name Subscriber Birth Date Member ID       PAM LOYD 1950 [id number]           Secondary Coverage     Payor Plan Insurance Group Employer/Plan Group    [employer]      Payor Plan Address Payor Plan Phone Number Effective From Effective To    [address] [address]  [address]     [place] [address]       Subscriber Name Subscriber Birth Date Member ID       PAM LOYD 1950 [id number]                 Emergency Contacts      (Rel.) Home Phone Work Phone Mobile Phone    Tessie Dorado (Daughter) [phone] --               History & Physical      Isabel Nava MD at 2017  4:31 AM              [facility] Medicine Services  HISTORY AND PHYSICAL    Patient Name: Pam Loyd  : 1950  MRN: [id number]  Primary Care " Physician: Trevon Delarosa MD    Subjective   Subjective     Chief Complaint:  Abdominal pain    HPI:  Joy Bueno is a 67 y.o. female who was recently admitted to the hospital for evaluation of urinary tract infection.  Patient was discharged for rehabilitation Center the day, reports developing abdominal pain in the lower anterior abdomen.  Upon presentation here she continues to have the pain.  Patient had a large bowel movement prior to be evaluated the patient, she reports that led to complete resolution of pain.  She denies chest pain or difficult breathing.  No fever.  Stool is loose in appearance with heidi consistency. No watery diarrhea has been present.  No other acute complaints.  Upon further discussion with the patient about her social situation it seems the patient does not have good social support. I am concerned she is unable to walk and perform activities of daily living, and primary family member, daughter, admits she is not going to be able to help with these activities either.   She was hospitalized here from 10/4/17 to 10/11/17 due to UTI related to mendieta cath and D Diff. She was DC'd to rehab after refusing to go to a SNF. She just finished the allowed number of rehab days and although she still required significant assistance to get out of bed and tend to her ADL's she went home.   She has had a couple of bowel movements since arrival, the second one being much looser. C diff is pending which was ordered by ED. She did complete treatment.  Otherwise she does not have any new findings on her labs. Her CKD appears stable with a creatinine of 2.1.  Given her mobility issues and no help at home along with the abdominal pain. She has been referred for admission with hospital medicine      Review of Systems   Constitutional: Positive for activity change. Negative for chills, diaphoresis and unexpected weight change.   HENT: Negative.    Eyes: Negative.    Respiratory: Positive for cough and  "shortness of breath.    Cardiovascular: Positive for leg swelling. Negative for chest pain and palpitations.   Gastrointestinal: Positive for abdominal pain, constipation and diarrhea. Negative for blood in stool and vomiting.   Endocrine: Negative.    Genitourinary: Positive for difficulty urinating.   Musculoskeletal: Positive for arthralgias and gait problem.       (consider inserting and editing \".LEXROS\" if pertinent positives and negatives are not already listed in HPI)    Otherwise 10-system ROS reviewed and is negative except as mentioned in the HPI.    Personal History     Past Medical History:   Diagnosis Date   • Anemia    • Anxiety    • Asthma    • Diabetes mellitus    • Hypertension    • Morbid obesity    • Osteoarthritis        Past Surgical History:   Procedure Laterality Date   •  SECTION     • COLONOSCOPY N/A 2017    Procedure: COLONOSCOPY;  Surgeon: Mark I Brunner, MD;  Location:  [facility];  Service:    • ENDOSCOPY N/A 2017    Procedure: ESOPHAGOGASTRODUODENOSCOPY ;  Surgeon: Velasquez Call MD;  Location:  [facility];  Service:    • HERNIA REPAIR         Family History: family history is not on file.     Social History:  reports that she has quit smoking. She does not have any smokeless tobacco history on file. She reports that she does not drink alcohol or use illicit drugs.    Medications:    (Not in a hospital admission)    No Known Allergies    Objective   Objective     Vital Signs:   Temp:  [98.5 °F (36.9 °C)] 98.5 °F (36.9 °C)  Heart Rate:  [92] 92  Resp:  [18] 18  BP: (202)/(103) 202/103        Physical Exam   Constitutional: She is oriented to person, place, and time.   Morbidly obese woman laying in bed. Difficulty moving her extremities. In NAD   HENT:   Head: Normocephalic and atraumatic.   Eyes: EOM are normal. Pupils are equal, round, and reactive to light.   Neck: Normal range of motion. Neck supple. No JVD present. No tracheal deviation present. No " "thyromegaly present.   Cardiovascular: Normal rate, regular rhythm and normal heart sounds.  Exam reveals no friction rub.    No murmur heard.  Pulmonary/Chest: Effort normal.   Coarse breat sounds on left. Weak cough.   Musculoskeletal: She exhibits edema (2 plus pitting bilateral legs.). She exhibits no tenderness or deformity.   Neurological: She is alert and oriented to person, place, and time. No cranial nerve deficit.   Skin:   Multiple fissured areas in pannus   Psychiatric: She has a normal mood and affect.   Poor insight. Expresses rather unrealistic expectations of how she is going to get around better.    Nursing note reviewed.      (consider inserting and editing \".LEXEXAMHP\" for qualifying comprehensive exam for Level 3 billing)    Results Reviewed:  I have personally reviewed current lab, radiology, and data and agree.      Results from last 7 days  Lab Units 11/04/17  0025   WBC 10*3/mm3 12.45*   HEMOGLOBIN g/dL 9.7*   HEMATOCRIT % 31.4*   PLATELETS 10*3/mm3 277       Results from last 7 days  Lab Units 11/04/17  0026   SODIUM mmol/L 145   POTASSIUM mmol/L 3.2*   CHLORIDE mmol/L 108   CO2 mmol/L 29.0   BUN mg/dL 41*   CREATININE mg/dL 2.10*   GLUCOSE mg/dL 152*   CALCIUM mg/dL 8.7   ALT (SGPT) U/L 13   AST (SGOT) U/L 16     No results found for: BNP  No results found for: PHART  Imaging Results (last 24 hours)     Procedure Component Value Units Date/Time    CT Abdomen Pelvis Without Contrast [[id number]] Collected:  11/04/17 0041     Updated:  11/04/17 0117    Narrative:       EXAM:    CT Abdomen and Pelvis Without Intravenous Contrast    CLINICAL HISTORY:    67 years old, female; Pain; Abdominal pain; Patient HX: No prior abd imaging   at this facility; Additional info: Lower abdominal pain    TECHNIQUE:    Axial computed tomography images of the abdomen and pelvis without   intravenous contrast.  All CT scans at this facility use one or more dose   reduction techniques, viz.: automated exposure " control; ma/kV adjustment per   patient size (including targeted exams where dose is matched to indication;   i.e. head); or iterative reconstruction technique.    Coronal reformatted images were created and reviewed.    COMPARISON:    CT NEEDLE BIOPSY KIDNEY 2017-08-10 14:00    FINDINGS:    Limitations:  Evaluation of the soft tissues is somewhat limited by lack of   intravenous contrast.  Partial exclusion of the abdominal wall due to patient   body habitus.    Lower thorax:  Partially visualized moderate to large right pleural effusion.    Cardiomegaly.  Trace left pleural effusion.  Basal atelectasis.     ABDOMEN:    Liver:  Unremarkable.    Gallbladder and bile ducts:  Unremarkable.  No calcified stones.  No ductal   dilation.    Pancreas:  Unremarkable.  No ductal dilation.    Spleen:  Unremarkable.  No splenomegaly.    Adrenals:  Unremarkable.  No mass.    Kidneys and ureters:  Bilateral renal cysts.  Possible small hyperdense right   renal cyst.  No obstructing stones.  No hydronephrosis.    Stomach and bowel:  Overall moderate colonic stool.  Prominent stool within   the sigmoid colon and rectal vault.  No obstruction.  No mucosal thickening.    Appendix:  No findings to suggest acute appendicitis.     PELVIS:    Bladder:  Unremarkable.  No stones.    Reproductive:  Possible uterine fibroids.     ABDOMEN and PELVIS:    Intraperitoneal space:  Fluid containing lower abdominal ventral hernia.  No   free air.    Bones/joints:  Mild degenerative disc disease is noted within the visualized   spine.  Mild bilateral hip DJD.  No acute fracture.  No dislocation.    Soft tissues:  Partially visualized body wall edema.    Vasculature:  Mild atherosclerosis.  No abdominal aortic aneurysm.    Lymph nodes:  Unremarkable.  No enlarged lymph nodes.      Impression:       1.  Fluid containing lower abdominal ventral hernia.  2.  Partially visualized moderate to large right pleural effusion.  3.  Overall moderate colonic  stool.  Prominent stool within the sigmoid colon   and rectal vault.    THIS DOCUMENT HAS BEEN ELECTRONICALLY SIGNED BY ROSALIND JAQUEZ MD        Results for orders placed during the hospital encounter of 09/02/17   Adult Transthoracic Echo Complete    Narrative · Left ventricular wall thickness is consistent with mild concentric   hypertrophy.  · Left atrial cavity size is mildly dilated.  · Mild mitral valve regurgitation is present  · calcification of the aortic valve  · Mild tricuspid valve regurgitation is present.          Assessment/Plan   Assessment / Plan     Hospital Problem List     * (Principal)Abdominal pain    Morbid obesity    Anemia    Overview Addendum 8/28/2017 11:03 AM by Piyush Stokes MD     Iron deficient         CKD (chronic kidney disease) Stage 3    Overview Signed 8/28/2017 11:03 AM by Piyush Stokes MD     Followed by nephrology associates (improving)         Diabetes mellitus type 2, insulin dependent (Chronic)    Chronic heart failure (Chronic)            Assessment & Plan:  1. Abdominal pain: Somewhat improved after BM. Will continue with regular meds. Further workup if needed, but  No evidence of acute problem now. c diff ordered by ED given the recent dx and treatment.   2. Morbid Obesity with multiple complications and mobility/self care issues. Patient encouraged to reconsider SNF placement since she is obviously can not manage on her own at this point and her daughter works too much to be able to be there to help. Case management to see. PT/OT.  3. Anemia: hx of Fe defiency and probably chronic disease.  4. CKD appears stable around stage III. \  5. Diabetes 2. Insulin dependent. AC/HS fsbg. Does not require much insulin.  6. Chronic heart failure. 2 plus pitting edema. Appears fairly stable. Continue same med regimen.    DC Planning: Case management to follow. Difficult situation as patient has failed with just a minimum of time at home to manage ADL's.  DVT prophylaxis:  renal dose heparin.     CODE STATUS:  Prior full    Admission Status:  I believe this patient meets obs (insert and edit “.LEXOBS” or “.LEXINPT”, add any supporting concerns here to justify INPATIENT status, for OBS patient no discussion is required)    Maddi VBrenden Dodge, SUELLEN   11/04/17   4:31 AM      PHYSICIAN NOTE(ADDENDUM)     PM HX  Morbidly obese  Has been in and out of the hospital since July.  Recent admission in October with UTI, C. difficile, transferred to rehabilitation, was just discharged on 11/3/2017 to home, but poor social support at home.  -Limited mobility.  Mood disorder and anxiety.  Chronic pedal edema on Lasix.  Hypertension/hyperlipidemia.  DM 2.  -Chronic renal insufficiency  Chronic anemia-patient had EGD done on 8/17 which shows chronic gastritis but she refused colonoscopy.     HPI  67-year-old female who presented back to ER secondary to complain of lower abdominal pain and episode of loose BM, CT abdomen showed constipation, C. difficile still pending.  -Her UA is negative, her chest x-ray shows some right basal effusion-somewhat chronic has been treated with antibiotics for pneumonia in past, but doesn't appear to be clearing.  Her last echo done in September 2017 shows ejection fraction of 79% no mention of diastolic dysfunction.  -Complains of chronic cough no worsening.  On exam significant pedal edema but that is chronic as per patient, no abdominal tenderness elicited good bowel sounds, heart S1-S2 regular no murmur.     A/P  -Case management consult since patient does not want to go to rehabilitation and would like to go home but does not have good social support.  -Follow-up C. difficile results.  -CT chest to evaluate right pleural effusion.  -Her chronic conditions are stable did not change much.  -Rest assessment and plan as per NP's note.     I have independently seen and examined the patient with ARNP and the note above reflects my changes and contributions. I have  discussed with findings, diagnosis and plans with the patient and family.      Isabel Nava MD 11/04/17 5:20 AM         Electronically signed by Isabel Nava MD at 11/4/2017  7:14 AM        Ambulatory Referral to Home Health [[id number]] (Order [id number])   Outpatient Referral    Date: 11/7/2017   Department: [address]   Ordering/Authorizing: Matthew Ackerman MD           Order History  Outpatient       Date/Time Action Taken User Additional Information       11/07/17 1512 Sign Edyta Yo RN Ordering Mode: Telephone with readback           Order Details        Frequency Duration Priority Order Class       None None Routine Internal Referral           Start Date/Time        Start Date       11/07/17           Order Questions        Question Answer Comment       Face to Face Visit Date 11/7/2017        Follow-up Provider for Plan of Care? I treated the patient in an acute care facility and will not continue treatment after discharge.        Follow-up Provider ALBANIA RODRIGUEZ        Reason/Clinical Findings abdominal pain        Describe mobility limitations that make leaving home difficult generalized weakness, impaired physical mobility, anxiety, asthma, impaired functional mobility/ADL's        Nursing/Therapeutic Services Requested Physical Therapy         Occupational Therapy         Skilled Nursing        Skilled nursing orders: Medication education         Telehealth         Cardiopulmonary assessments         CHF management        PT orders: Therapeutic exercise         Gait Training         Transfer training         Strengthening         Home safety assessment        Weight Bearing Status As Tolerated        Occupational orders: Activities of daily living         Energy conservation         Strengthening         Home safety assessment            Verbal Order Info        Action Created on Order Mode Entered by Responsible Provider Signed by Signed on       Ordering 11/07/17  1512 Telephone with readback ADILSON Conley MD             Source Order Set / Preference List        Preference List       AMB FAM REFERRALS [[id number]]           Clinical Indications           ICD-10-CM ICD-9-CM       Abdominal pain, unspecified abdominal location  - Primary     R10.9 789.00       Physical deconditioning     R53.81 799.3       Impaired functional mobility, balance, gait, and endurance     Z74.09 V49.89       Impaired mobility and ADLs     Z74.09 799.89       Morbid obesity     E66.01 278.01       Anxiety     F41.9 300.00       Generalized weakness     R53.1 780.79       Chronic heart failure, unspecified heart failure type     I50.9 428.9           Reprint Order Requisition        AMB REFERRAL TO HOME HEALTH (Order #[id number]) on 11/7/17         Encounter-Level Cardiology Documents:        There are no encounter-level cardiology documents.         Encounter        View Encounter         Order Provider Info            Office phone Pager E-mail       Ordering User Edyta Yo RN -- -- --       Authorizing Provider Matthew Ackerman [phone] -- --       Attending Provider Matthew Ackerman [phone] -- --       Entered By Edyta Yo RN -- -- --       Ordering Provider Matthew Ackerman [phone] -- --           Linked Charges        No charges linked to this procedure         Order Transmittal Tracking        AMB REFERRAL TO HOME HEALTH (Order #[id number]) on 11/7/17         Authorized by:  Matthew Ackerman MD  (NPI: [id number])

## 2017-11-08 LAB
ANION GAP SERPL CALCULATED.3IONS-SCNC: 7 MMOL/L (ref 3–11)
BUN BLD-MCNC: 53 MG/DL (ref 9–23)
BUN/CREAT SERPL: 23 (ref 7–25)
CALCIUM SPEC-SCNC: 8 MG/DL (ref 8.7–10.4)
CHLORIDE SERPL-SCNC: 109 MMOL/L (ref 99–109)
CO2 SERPL-SCNC: 26 MMOL/L (ref 20–31)
CREAT BLD-MCNC: 2.3 MG/DL (ref 0.6–1.3)
GFR SERPL CREATININE-BSD FRML MDRD: 26 ML/MIN/1.73
GLUCOSE BLD-MCNC: 112 MG/DL (ref 70–100)
GLUCOSE BLDC GLUCOMTR-MCNC: 124 MG/DL (ref 70–130)
GLUCOSE BLDC GLUCOMTR-MCNC: 145 MG/DL (ref 70–130)
GLUCOSE BLDC GLUCOMTR-MCNC: 145 MG/DL (ref 70–130)
GLUCOSE BLDC GLUCOMTR-MCNC: 151 MG/DL (ref 70–130)
POTASSIUM BLD-SCNC: 3.8 MMOL/L (ref 3.5–5.5)
SODIUM BLD-SCNC: 142 MMOL/L (ref 132–146)

## 2017-11-08 PROCEDURE — 25010000002 HEPARIN (PORCINE) PER 1000 UNITS: Performed by: NURSE PRACTITIONER

## 2017-11-08 PROCEDURE — 80048 BASIC METABOLIC PNL TOTAL CA: CPT | Performed by: HOSPITALIST

## 2017-11-08 PROCEDURE — 94799 UNLISTED PULMONARY SVC/PX: CPT

## 2017-11-08 PROCEDURE — 97110 THERAPEUTIC EXERCISES: CPT

## 2017-11-08 PROCEDURE — 82962 GLUCOSE BLOOD TEST: CPT

## 2017-11-08 PROCEDURE — 94640 AIRWAY INHALATION TREATMENT: CPT

## 2017-11-08 PROCEDURE — 94760 N-INVAS EAR/PLS OXIMETRY 1: CPT

## 2017-11-08 RX ADMIN — ATORVASTATIN CALCIUM 10 MG: 10 TABLET, FILM COATED ORAL at 22:57

## 2017-11-08 RX ADMIN — IPRATROPIUM BROMIDE AND ALBUTEROL SULFATE 3 ML: .5; 3 SOLUTION RESPIRATORY (INHALATION) at 19:07

## 2017-11-08 RX ADMIN — MICONAZOLE NITRATE: 20 POWDER TOPICAL at 22:54

## 2017-11-08 RX ADMIN — Medication 250 MG: at 08:55

## 2017-11-08 RX ADMIN — INSULIN LISPRO 2 UNITS: 100 INJECTION, SOLUTION INTRAVENOUS; SUBCUTANEOUS at 22:54

## 2017-11-08 RX ADMIN — HYDRALAZINE HYDROCHLORIDE 100 MG: 25 TABLET ORAL at 17:31

## 2017-11-08 RX ADMIN — HEPARIN SODIUM 5000 UNITS: 5000 INJECTION, SOLUTION INTRAVENOUS; SUBCUTANEOUS at 08:56

## 2017-11-08 RX ADMIN — HYDROCODONE BITARTATE AND ACETAMINOPHEN 1 TABLET: 5; 325 TABLET ORAL at 00:05

## 2017-11-08 RX ADMIN — BUDESONIDE AND FORMOTEROL FUMARATE DIHYDRATE 2 PUFF: 160; 4.5 AEROSOL RESPIRATORY (INHALATION) at 09:00

## 2017-11-08 RX ADMIN — Medication 3 TABLET: at 06:31

## 2017-11-08 RX ADMIN — HYDRALAZINE HYDROCHLORIDE 100 MG: 25 TABLET ORAL at 08:54

## 2017-11-08 RX ADMIN — FUROSEMIDE 40 MG: 40 TABLET ORAL at 17:31

## 2017-11-08 RX ADMIN — MICONAZOLE NITRATE: 20 POWDER TOPICAL at 08:56

## 2017-11-08 RX ADMIN — SODIUM CHLORIDE 75 ML/HR: 9 INJECTION, SOLUTION INTRAVENOUS at 05:03

## 2017-11-08 RX ADMIN — CASTOR OIL AND BALSAM, PERU: 788; 87 OINTMENT TOPICAL at 22:54

## 2017-11-08 RX ADMIN — CASTOR OIL AND BALSAM, PERU: 788; 87 OINTMENT TOPICAL at 08:56

## 2017-11-08 RX ADMIN — ISOSORBIDE MONONITRATE 60 MG: 60 TABLET, EXTENDED RELEASE ORAL at 08:55

## 2017-11-08 RX ADMIN — ESCITALOPRAM OXALATE 10 MG: 10 TABLET ORAL at 08:55

## 2017-11-08 RX ADMIN — MULTIPLE VITAMINS W/ MINERALS TAB 1 TABLET: TAB ORAL at 08:54

## 2017-11-08 RX ADMIN — HYDRALAZINE HYDROCHLORIDE 100 MG: 25 TABLET ORAL at 22:55

## 2017-11-08 RX ADMIN — ACETAMINOPHEN 650 MG: 325 TABLET ORAL at 22:57

## 2017-11-08 RX ADMIN — FUROSEMIDE 40 MG: 40 TABLET ORAL at 08:54

## 2017-11-08 RX ADMIN — HEPARIN SODIUM 5000 UNITS: 5000 INJECTION, SOLUTION INTRAVENOUS; SUBCUTANEOUS at 22:58

## 2017-11-08 RX ADMIN — BUDESONIDE AND FORMOTEROL FUMARATE DIHYDRATE 2 PUFF: 160; 4.5 AEROSOL RESPIRATORY (INHALATION) at 19:07

## 2017-11-08 RX ADMIN — Medication 250 MG: at 17:31

## 2017-11-08 RX ADMIN — AMLODIPINE BESYLATE 10 MG: 10 TABLET ORAL at 08:54

## 2017-11-08 RX ADMIN — IPRATROPIUM BROMIDE AND ALBUTEROL SULFATE 3 ML: .5; 3 SOLUTION RESPIRATORY (INHALATION) at 09:00

## 2017-11-08 NOTE — THERAPY TREATMENT NOTE
Acute Care - Physical Therapy Treatment Note  [facility]     Patient Name: Joy Bueno  : 1950  MRN: [id number]  Today's Date: 2017  Onset of Illness/Injury or Date of Surgery Date: 17  Date of Referral to PT: 17  Referring Physician: SUELLEN Dodge    Admit Date: 11/3/2017    Visit Dx:    ICD-10-CM ICD-9-CM   1. Abdominal pain, unspecified abdominal location R10.9 789.00   2. Diarrhea, unspecified type R19.7 787.91   3. Physical deconditioning R53.81 799.3   4. Impaired functional mobility, balance, gait, and endurance Z74.09 V49.89   5. Impaired mobility and ADLs Z74.09 799.89   6. Morbid obesity E66.01 278.01   7. Anxiety F41.9 300.00   8. Generalized weakness R53.1 780.79   9. Chronic heart failure, unspecified heart failure type I50.9 428.9     Patient Active Problem List   Diagnosis   • Asthma   • Diabetes mellitus   • Hypertension   • Symptomatic anemia   • Morbid obesity   • Anxiety   • Anemia   • CKD (chronic kidney disease) Stage 3   • Gastritis   • Pleural effusion, right   • Possible pneumonia of RML/RLL   • Clostridium difficile diarrhea   • Urinary tract infection associated with indwelling urethral catheter   • Bilateral leg pain   • Generalized weakness   • Abdominal pain   • Diabetes mellitus type 2, insulin dependent   • Chronic heart failure               Adult Rehabilitation Note       17 1400          Rehab Assessment/Intervention    Discipline physical therapist  -MB      Document Type therapy note (daily note)  -MB      Subjective Information agree to therapy  -MB      Patient Effort, Rehab Treatment good  -MB      Symptoms Noted During/After Treatment none  -MB      Precautions/Limitations fall precautions;oxygen therapy device and L/min   spore precautions, morbidly obese  -MB      Recorded by [MB] Jillian Szymanski, PT      Positioning and Restraints    Pre-Treatment Position in bed  -MB      Post Treatment Position bed  -MB      In Bed notified  nsg;supine;call light within reach;encouraged to call for assist  -MB      Recorded by [MB] Jillian Szymanski, PT        User Key  (r) = Recorded By, (t) = Taken By, (c) = Cosigned By    Initials Name Effective Dates    STEPHANIE Szymanski, PT 03/14/16 -                 IP PT Goals       11/08/17 1432 11/06/17 1137       Bed Mobility PT LTG    Bed Mobility PT LTG, Date Established  11/06/17  -KM     Bed Mobility PT LTG, Time to Achieve  2 wks  -KM     Bed Mobility PT LTG, Activity Type  roll left/roll right  -KM     Bed Mobility PT LTG, [place] Level  moderate assist (50% patient effort);2 person assist required  -KM     Bed Mobility PT LTG, Outcome goal ongoing  -MB      Transfer Training PT LTG    Transfer Training PT LTG, Date Established  11/06/17  -KM     Transfer Training PT LTG, Time to Achieve  2 wks  -KM     Transfer Training PT LTG, Activity Type  sit to stand/stand to sit  -KM     Transfer Training PT LTG, [place] Level  moderate assist (50% patient effort);2 person assist required  -KM     Transfer Training PT LTG, Outcome goal ongoing  -MB        User Key  (r) = Recorded By, (t) = Taken By, (c) = Cosigned By    Initials Name Provider Type    SERGEI Miller, PT Physical Therapist    STEPHANIE Szymanski, PT Physical Therapist          Physical Therapy Education     Title: PT OT SLP Therapies (Active)     Topic: Physical Therapy (Done)     Point: Mobility training (Done)    Learning Progress Summary    Learner Readiness Method Response Comment Documented by Status   Patient Acceptance E,D VU,NR POC progression, HEP, D/C planning, benefits of therapy/OOB activity MB 11/08/17 1432 Done    Acceptance E VU discussed plan of care  11/06/17 1141 Done               Point: Home exercise program (Done)    Learning Progress Summary    Learner Readiness Method Response Comment Documented by Status   Patient Acceptance E,D VU,NR POC progression, HEP, D/C planning, benefits of therapy/OOB  activity MB 11/08/17 1432 Done    Acceptance E VU discussed plan of care  11/06/17 1141 Done               Point: Body mechanics (Done)    Learning Progress Summary    Learner Readiness Method Response Comment Documented by Status   Patient Acceptance E,D VU,NR POC progression, HEP, D/C planning, benefits of therapy/OOB activity MB 11/08/17 1432 Done    Acceptance E VU discussed plan of care  11/06/17 1141 Done               Point: Precautions (Done)    Learning Progress Summary    Learner Readiness Method Response Comment Documented by Status   Patient Acceptance E,D VU,NR POC progression, HEP, D/C planning, benefits of therapy/OOB activity MB 11/08/17 1432 Done    Acceptance E VU discussed plan of care  11/06/17 1141 Done                      User Key     Initials Effective Dates Name Provider Type Discipline     06/19/15 -  Siobhan Miller, PT Physical Therapist PT    MB 03/14/16 -  iJllian Szyamnski, PT Physical Therapist PT                    PT Recommendation and Plan  Anticipated Discharge Disposition: skilled nursing facility  Plan of Care Review  Plan Of Care Reviewed With: patient  Progress: progress toward functional goals as expected  Outcome Summary/Follow up Plan: Pt. w/ good effort, participated in B LE TherEx in supine w/ AAROM.   Pt. continues to require dep A for bed mob.  Recommend cont PT per POC.          Outcome Measures       11/08/17 1400 11/06/17 1019 11/06/17 1016    How much help from another person do you currently need...    Turning from your back to your side while in flat bed without using bedrails? 1  -MB 1  -KM     Moving from lying on back to sitting on the side of a flat bed without bedrails? 1  -MB 1  -KM     Moving to and from a bed to a chair (including a wheelchair)? 1  -MB 1  -KM     Standing up from a chair using your arms (e.g., wheelchair, bedside chair)? 1  -MB 1  -KM     Climbing 3-5 steps with a railing? 1  -MB 1  -KM     To walk in hospital room? 1  -MB 1   -KM     AM-PAC 6 Clicks Score 6  -MB 6  -KM     How much help from another is currently needed...    Putting on and taking off regular lower body clothing?   1  -JR    Bathing (including washing, rinsing, and drying)   1  -JR    Toileting (which includes using toilet bed pan or urinal)   1  -JR    Putting on and taking off regular upper body clothing   2  -JR    Taking care of personal grooming (such as brushing teeth)   2  -JR    Eating meals   3  -JR    Score   10  -JR    Functional Assessment    Outcome Measure Options AM-PAC 6 Clicks Basic Mobility (PT)  -MB AM-PAC 6 Clicks Basic Mobility (PT)  -KM AM-PAC 6 Clicks Daily Activity (OT)  -JR      User Key  (r) = Recorded By, (t) = Taken By, (c) = Cosigned By    Initials Name Provider Type    SERGEI Miller, PT Physical Therapist    JR Danii Mendoza, OT Occupational Therapist    STEPHANIE Szymanski, PT Physical Therapist           Time Calculation:         PT Charges       11/08/17 1435          Time Calculation    Start Time 1400  -MB      PT Received On 11/08/17  -MB      PT Goal Re-Cert Due Date 11/16/17  -MB      Time Calculation- PT    Total Timed Code Minutes- PT 25 minute(s)  -MB        User Key  (r) = Recorded By, (t) = Taken By, (c) = Cosigned By    Initials Name Provider Type    STEPHANIE Szymanski, PT Physical Therapist          Therapy Charges for Today     Code Description Service Date Service Provider Modifiers Qty    09772657225 HC PT THER PROC EA 15 MIN 11/8/2017 Jillian Szymanski, PT GP 2          PT G-Codes  Outcome Measure Options: AM-PAC 6 Clicks Basic Mobility (PT)  Score: 6  Functional Limitation: Changing and maintaining body position  Changing and Maintaining Body Position Current Status (): 100 percent impaired, limited or restricted  Changing and Maintaining Body Position Goal Status (): At least 60 percent but less than 80 percent impaired, limited or restricted    Jillian Szymanski, PT  11/8/2017

## 2017-11-08 NOTE — NURSING NOTE
Patient IV needs to be restarted per hospital protocol. Patient refused. Stated she is going home tomorrow and she is not being stuck again.

## 2017-11-08 NOTE — PROGRESS NOTES
"    [facility] Medicine Services  PROGRESS NOTE    Patient Name: Joy Bueno  : 1950  MRN: [id number]    Date of Admission: 11/3/2017  Length of Stay: 1  Primary Care Physician: Trevon Delarosa MD    Subjective   Subjective     CC:  \"Dont feel good\"    HPI:  No new complaints, just don't feel well overall and requires assistance with all aspects of ADL.    Review of Systems  Otherwise ROS is negative for any acute, except as mentioned in the HPI previously.    Objective   Objective     Vital Signs:   Temp:  [97.1 °F (36.2 °C)-98 °F (36.7 °C)] 97.5 °F (36.4 °C)  Heart Rate:  [79-85] 79  Resp:  [16-18] 18  BP: (147-176)/(65-86) 147/65        Physical Exam:  Constitutional: No acute distress, awake, alert  Eyes: PERRLA, sclerae anicteric, no conjunctival injection  HENT: NCAT, mucous membranes moist  Neck: Supple, trachea midline  Respiratory: Food air flow in upper lungs, decreased BS at bases, no wheezing, mild rattling at upper air way, nonlabored respirations   Cardiovascular: RRR, no murmurs, rubs, or gallops  Gastrointestinal: Obese, Positive bowel sounds, soft, nontender, nondistended  Musculoskeletal: Chronic LE edema with trace pitting, no clubbing or cyanosis to extremities  Psychiatric: Appropriate affect, cooperative  Neurologic: No facial asymmetry, speech clear  Skin: No rashes    Results Reviewed:  I have personally reviewed current lab, radiology, and data and agree.      Results from last 7 days  Lab Units 17  0025   WBC 10*3/mm3 12.45*   HEMOGLOBIN g/dL 9.7*   HEMATOCRIT % 31.4*   PLATELETS 10*3/mm3 277       Results from last 7 days  Lab Units 17  0451 17  0505 17  0648 17  0026   SODIUM mmol/L 142 142 143 145   POTASSIUM mmol/L 3.8 3.8 3.1* 3.2*   CHLORIDE mmol/L 109 107 108 108   CO2 mmol/L 26.0 29.0 28.0 29.0   BUN mg/dL 53* 50* 47* 41*   CREATININE mg/dL 2.30* 2.50* 2.40* 2.10*   GLUCOSE mg/dL 112* 113* 94 152*   CALCIUM mg/dL " 8.0* 7.7* 7.8* 8.7   ALT (SGPT) U/L  --   --   --  13   AST (SGOT) U/L  --   --   --  16     No results found for: BNP  No results found for: PHART    Microbiology Results Abnormal     Procedure Component Value - Date/Time    Urine Culture - Urine, Urine, Clean Catch [[id number]]  (Normal) Collected:  11/04/17 0215    Lab Status:  Final result Specimen:  Urine from Urine, Catheter Updated:  11/06/17 0808     Urine Culture No growth at 2 days    Clostridium Difficile Toxin - Stool, Per Rectum [[id number]] Collected:  11/04/17 0819    Lab Status:  Final result Specimen:  Stool from Per Rectum Updated:  11/04/17 1052    Narrative:       The following orders were created for panel order Clostridium Difficile Toxin - Stool, Per Rectum.  Procedure                               Abnormality         Status                     ---------                               -----------         ------                     Clostridium Difficile To...[[id number]]  Normal              Final result                 Please view results for these tests on the individual orders.    Clostridium Difficile Toxin, PCR - Stool, Per Rectum [[id number]]  (Normal) Collected:  11/04/17 0819    Lab Status:  Final result Specimen:  Stool from Per Rectum Updated:  11/04/17 1052     C. Difficile Toxins by PCR Not Detected    Narrative:         Performance characteristics of test not established for patients <2 years of age.  Negative for Toxigenic C. Difficile          Imaging Results (last 24 hours)     ** No results found for the last 24 hours. **        Results for orders placed during the hospital encounter of 09/02/17   Adult Transthoracic Echo Complete    Narrative · Left ventricular wall thickness is consistent with mild concentric   hypertrophy.  · Left atrial cavity size is mildly dilated.  · Mild mitral valve regurgitation is present  · calcification of the aortic valve  · Mild tricuspid valve regurgitation is present.          I have reviewed the  medications.    Assessment/Plan   Assessment / Plan     Hospital Problem List     * (Principal)Abdominal pain    Asthma    Hypertension    Morbid obesity    Anxiety    Anemia    Overview Addendum 8/28/2017 11:03 AM by Piyush Stokes MD     Iron deficient         CKD (chronic kidney disease) Stage 3    Overview Signed 8/28/2017 11:03 AM by Piyush Stokes MD     Followed by nephrology associates (improving)         Generalized weakness    Diabetes mellitus type 2, insulin dependent (Chronic)    Chronic heart failure (Chronic)            Brief Hospital Course to date:  68 yo F with history of recent CAUTI and C diff infection, recently finished PO Vanc course, sent to rehab, and recently discharged from rehab, been home few days, and brought back due to abd pain with diarrhea, but no BM recorded here after 2 days of hospitalization. No fever or chills. CT C/A/P showed moderate stool retention, as well as pleural effusion, which has improved when compared to previous exam. Still very weak and requires significant assistance with ADL.          Assessment & Plan:  HTN  - suboptimal at this time, but improved some, poss due to some pain/discomfort  - cont with current meds for now      Asthma  - no exac      Urinary retention/history of CAUTI  - etiology unclear, appreciate pharm assistance with reviewing poss medication culprit  - repeat UA neg      Recent C diff infection/abd pain/stool retention  - no diarrhea here, but pt reported she had some at home  - complete 14 day course of PO Vanc, will not repeat lab  - Bowel care PRN      DM2  - A1C 5.4, poss poor prognostic indicator vs false low due to chronic anemia  - SSI      Anxiety  - SSRI      Chronic DHF  - Creatinine bumped, so hold diuretic for now  - monitor strict I&O      CKD  - base creatinine 1.5-2.2      Anemia  - chronic and stable      Morbid obesity  - multifactorial      Gen weakness/deconditioning  - multifactorial  - PT/OT consulted  - requires  assistance with all aspects of ADL at this time  - will need CM/SW's assistance, CM working on home needs/equipments. SW made APS report for self neglect.      DVT Prophylaxis:  Hep sq      CODE STATUS: Full Code      Disposition: Home tomorrow? Was supposed to go today, but no one is at home to received pt. She is morbidly obese and is debilitated/bedbound. Had been to rehab before and does not want to go back. Daughter works 12 hr/day x 4 day/wk, so I don't think she will be able to care for pt much. I discussed this with pt, but she insisted that someone will be able to take care of her. I have not spoken to anyone and CM only spoke with daughter. Pt reported that dtr's boyfriend and neighbor will be able to help her, but I don't have confirmation from them . SW has filed case with APS for self neglect. I've also talked about possibility of needing to go to NH since she is severely debilitated at this time, but she declined. She is at high risks for complications/physical injury upon returning home. I would not be surprise if she is re-admitted within 1-2 weeks. Will re-assess again tomorrow to try an ensure safe discharge as much as possible.    Matthew Ackerman MD  11/08/17  3:00 PM

## 2017-11-08 NOTE — PROGRESS NOTES
Continued Stay Note  [facility]     Patient Name: Joy Bueno  MRN: [id number]  Today's Date: 11/8/2017    Admit Date: 11/3/2017          Discharge Plan       11/08/17 1511    Case Management/Social Work Plan    Additional Comments SW had also cancelled the ambulance for pt today SW will reschedule when pt ready for discharge.       11/08/17 1504    Case Management/Social Work Plan    Additional Comments Pt reports she does not have a primary care physician. Pt agreeable and TREVOR discussed with CM supervisor about [facility]. TREVOR assisted pt in contacting [facility] and filed out application online for pt to be contacted to be set up with [facility]. TREVOR also was contacted by pt's APS worker after having made a report. Raghavendra Coral [phone] reports that he opened the case a while ago but nothing has been able to be done as pt is able to make her own decisions. Raghavendra reports he would like to come do a safety assessment on pt before she leaves the hospital and will try ot come see pt tomorrow afternoon.               Discharge Codes     None        Expected Discharge Date and Time     Expected Discharge Date Expected Discharge Time    Nov 8, 2017             ROGELIO Maloney

## 2017-11-08 NOTE — PLAN OF CARE
Problem: Patient Care Overview (Adult)  Goal: Plan of Care Review  Outcome: Ongoing (interventions implemented as appropriate)    11/08/17 1432   Coping/Psychosocial Response Interventions   Plan Of Care Reviewed With patient   Patient Care Overview   Progress progress toward functional goals as expected   Outcome Evaluation   Outcome Summary/Follow up Plan Pt. w/ good effort, participated in B LE TherEx in supine w/ AAROM. Pt. continues to require dep A for bed mob. Recommend cont PT per POC.         Problem: Inpatient Physical Therapy  Goal: Bed Mobility Goal LTG- PT  Outcome: Ongoing (interventions implemented as appropriate)    11/06/17 1137 11/08/17 1432   Bed Mobility PT LTG   Bed Mobility PT LTG, Date Established 11/06/17 --    Bed Mobility PT LTG, Time to Achieve 2 wks --    Bed Mobility PT LTG, Activity Type roll left/roll right --    Bed Mobility PT LTG, [place] Level moderate assist (50% patient effort);2 person assist required --    Bed Mobility PT LTG, Outcome --  goal ongoing       Goal: Transfer Training Goal 1 LTG- PT  Outcome: Ongoing (interventions implemented as appropriate)    11/06/17 1137 11/08/17 1432   Transfer Training PT LTG   Transfer Training PT LTG, Date Established 11/06/17 --    Transfer Training PT LTG, Time to Achieve 2 wks --    Transfer Training PT LTG, Activity Type sit to stand/stand to sit --    Transfer Training PT LTG, [place] Level moderate assist (50% patient effort);2 person assist required --    Transfer Training PT LTG, Outcome --  goal ongoing

## 2017-11-08 NOTE — PROGRESS NOTES
Continued Stay Note  [facility]     Patient Name: Joy Bueno  MRN: [id number]  Today's Date: 11/8/2017    Admit Date: 11/3/2017          Discharge Plan       11/08/17 0905    Case Management/Social Work Plan    Additional Comments SW spoke with pt at bedside as pt requested to speak with SW. Pt reports that no one will be home today for her to get in her house. SW also spoke with physician and physician reports that someone will have to be home in order to ensure the safest discharge plan that we are able.              Discharge Codes     None        Expected Discharge Date and Time     Expected Discharge Date Expected Discharge Time    Nov 8, 2017             ROGELIO Maloney

## 2017-11-09 LAB
ANION GAP SERPL CALCULATED.3IONS-SCNC: 7 MMOL/L (ref 3–11)
BUN BLD-MCNC: 51 MG/DL (ref 9–23)
BUN/CREAT SERPL: 21.3 (ref 7–25)
CALCIUM SPEC-SCNC: 8.3 MG/DL (ref 8.7–10.4)
CHLORIDE SERPL-SCNC: 111 MMOL/L (ref 99–109)
CO2 SERPL-SCNC: 28 MMOL/L (ref 20–31)
CREAT BLD-MCNC: 2.4 MG/DL (ref 0.6–1.3)
GFR SERPL CREATININE-BSD FRML MDRD: 24 ML/MIN/1.73
GLUCOSE BLD-MCNC: 109 MG/DL (ref 70–100)
GLUCOSE BLDC GLUCOMTR-MCNC: 109 MG/DL (ref 70–130)
GLUCOSE BLDC GLUCOMTR-MCNC: 111 MG/DL (ref 70–130)
GLUCOSE BLDC GLUCOMTR-MCNC: 141 MG/DL (ref 70–130)
GLUCOSE BLDC GLUCOMTR-MCNC: 161 MG/DL (ref 70–130)
POTASSIUM BLD-SCNC: 3.8 MMOL/L (ref 3.5–5.5)
SODIUM BLD-SCNC: 146 MMOL/L (ref 132–146)

## 2017-11-09 PROCEDURE — 25010000002 HEPARIN (PORCINE) PER 1000 UNITS: Performed by: NURSE PRACTITIONER

## 2017-11-09 PROCEDURE — 94640 AIRWAY INHALATION TREATMENT: CPT

## 2017-11-09 PROCEDURE — 82962 GLUCOSE BLOOD TEST: CPT

## 2017-11-09 PROCEDURE — 25010000002 ONDANSETRON PER 1 MG: Performed by: INTERNAL MEDICINE

## 2017-11-09 PROCEDURE — 80048 BASIC METABOLIC PNL TOTAL CA: CPT | Performed by: HOSPITALIST

## 2017-11-09 RX ORDER — ONDANSETRON 2 MG/ML
4 INJECTION INTRAMUSCULAR; INTRAVENOUS EVERY 6 HOURS PRN
Status: DISCONTINUED | OUTPATIENT
Start: 2017-11-09 | End: 2017-11-10 | Stop reason: HOSPADM

## 2017-11-09 RX ADMIN — HYDRALAZINE HYDROCHLORIDE 100 MG: 25 TABLET ORAL at 20:10

## 2017-11-09 RX ADMIN — MICONAZOLE NITRATE: 20 POWDER TOPICAL at 20:10

## 2017-11-09 RX ADMIN — Medication 250 MG: at 17:38

## 2017-11-09 RX ADMIN — ONDANSETRON 4 MG: 2 INJECTION INTRAMUSCULAR; INTRAVENOUS at 09:42

## 2017-11-09 RX ADMIN — MICONAZOLE NITRATE: 20 POWDER TOPICAL at 10:51

## 2017-11-09 RX ADMIN — CLONAZEPAM 0.25 MG: 0.5 TABLET ORAL at 20:11

## 2017-11-09 RX ADMIN — HYDRALAZINE HYDROCHLORIDE 100 MG: 25 TABLET ORAL at 17:38

## 2017-11-09 RX ADMIN — HYDROCODONE BITARTATE AND ACETAMINOPHEN 1 TABLET: 5; 325 TABLET ORAL at 17:38

## 2017-11-09 RX ADMIN — BUDESONIDE AND FORMOTEROL FUMARATE DIHYDRATE 2 PUFF: 160; 4.5 AEROSOL RESPIRATORY (INHALATION) at 07:54

## 2017-11-09 RX ADMIN — HYDROCODONE BITARTATE AND ACETAMINOPHEN 1 TABLET: 5; 325 TABLET ORAL at 10:51

## 2017-11-09 RX ADMIN — ESCITALOPRAM OXALATE 10 MG: 10 TABLET ORAL at 10:51

## 2017-11-09 RX ADMIN — CLONAZEPAM 0.25 MG: 0.5 TABLET ORAL at 08:31

## 2017-11-09 RX ADMIN — FUROSEMIDE 40 MG: 40 TABLET ORAL at 10:50

## 2017-11-09 RX ADMIN — AMLODIPINE BESYLATE 10 MG: 10 TABLET ORAL at 10:50

## 2017-11-09 RX ADMIN — MULTIPLE VITAMINS W/ MINERALS TAB 1 TABLET: TAB ORAL at 10:50

## 2017-11-09 RX ADMIN — Medication 250 MG: at 10:51

## 2017-11-09 RX ADMIN — ONDANSETRON 4 MG: 2 INJECTION INTRAMUSCULAR; INTRAVENOUS at 17:38

## 2017-11-09 RX ADMIN — CASTOR OIL AND BALSAM, PERU: 788; 87 OINTMENT TOPICAL at 10:51

## 2017-11-09 RX ADMIN — ISOSORBIDE MONONITRATE 60 MG: 60 TABLET, EXTENDED RELEASE ORAL at 10:51

## 2017-11-09 RX ADMIN — INSULIN LISPRO 2 UNITS: 100 INJECTION, SOLUTION INTRAVENOUS; SUBCUTANEOUS at 20:09

## 2017-11-09 RX ADMIN — HEPARIN SODIUM 5000 UNITS: 5000 INJECTION, SOLUTION INTRAVENOUS; SUBCUTANEOUS at 20:11

## 2017-11-09 RX ADMIN — HEPARIN SODIUM 5000 UNITS: 5000 INJECTION, SOLUTION INTRAVENOUS; SUBCUTANEOUS at 10:52

## 2017-11-09 RX ADMIN — SIMETHICONE CHEW TAB 80 MG 80 MG: 80 TABLET ORAL at 17:38

## 2017-11-09 RX ADMIN — CASTOR OIL AND BALSAM, PERU: 788; 87 OINTMENT TOPICAL at 20:10

## 2017-11-09 RX ADMIN — ACETAMINOPHEN 650 MG: 325 TABLET ORAL at 20:11

## 2017-11-09 RX ADMIN — Medication 3 TABLET: at 04:37

## 2017-11-09 RX ADMIN — SIMETHICONE CHEW TAB 80 MG 80 MG: 80 TABLET ORAL at 08:15

## 2017-11-09 RX ADMIN — ATORVASTATIN CALCIUM 10 MG: 10 TABLET, FILM COATED ORAL at 20:10

## 2017-11-09 RX ADMIN — CLONAZEPAM 0.25 MG: 0.5 TABLET ORAL at 00:35

## 2017-11-09 RX ADMIN — HYDRALAZINE HYDROCHLORIDE 100 MG: 25 TABLET ORAL at 10:49

## 2017-11-09 RX ADMIN — FUROSEMIDE 40 MG: 40 TABLET ORAL at 17:38

## 2017-11-09 NOTE — PROGRESS NOTES
Continued Stay Note  [facility]     Patient Name: Joy Bueno  MRN: [id number]  Today's Date: 11/9/2017    Admit Date: 11/3/2017          Discharge Plan       11/09/17 0951    Case Management/Social Work Plan    Additional Comments SW was called by pt's daughter, Tessie. Tessie reports she works until tonight at 7:00pm but is off all day tomorrow. Pt's daughter has to call [employer] to coordinate being there and having the lift redelivered and pt's daughter taught how to use it. [employer] will be closed by the time pt's daughter able to get there tonight. Pt's daughter reports she can be at pt's house and coordinate with [employer] tomorrow morning.               Discharge Codes     None        Expected Discharge Date and Time     Expected Discharge Date Expected Discharge Time    Nov 8, 2017             ROGELIO Maloney

## 2017-11-09 NOTE — PLAN OF CARE
Problem: Infection, Risk/Actual (Adult)  Goal: Infection Prevention/Resolution  Outcome: Ongoing (interventions implemented as appropriate)    Problem: Patient Care Overview (Adult)  Goal: Plan of Care Review  Outcome: Ongoing (interventions implemented as appropriate)    11/09/17 0327   Coping/Psychosocial Response Interventions   Plan Of Care Reviewed With patient   Patient Care Overview   Progress progress toward functional goals as expected   Outcome Evaluation   Outcome Summary/Follow up Plan Patient to go home tomorrow if family able to facilitate discharge. Agent from APS to come do a safety inspection before DC. No acute events over night. As of 11/9/17 at 0328.         Problem: Fall Risk (Adult)  Goal: Absence of Falls  Outcome: Ongoing (interventions implemented as appropriate)    Problem: Skin Integrity Impairment, Risk/Actual (Adult)  Goal: Skin Integrity/Wound Healing  Outcome: Ongoing (interventions implemented as appropriate)

## 2017-11-09 NOTE — PROGRESS NOTES
Continued Stay Note  [facility]     Patient Name: Joy Bueno  MRN: [id number]  Today's Date: 11/9/2017    Admit Date: 11/3/2017          Discharge Plan       11/09/17 1614    Case Management/Social Work Plan    Additional Comments TREVOR spoke with [employer] and they can deliver the lift back to pt's home with daughter there tomorrow. [employer] scheduled to deliver lift around 10:00am. TREVOR set up  ambulance for pt to discharge home. Ambulance scheduled for 11:00am tomorrow with [facility]. TREVOR called and spoke with daughter and updated her on this. Tessie reports she will beat pt's home. TREVOR also spoke with Berandette at [facility] and they are aware pt is discharging tomorrow and will get pt on the schedule.               Discharge Codes     None        Expected Discharge Date and Time     Expected Discharge Date Expected Discharge Time    Nov 8, 2017             ROGELIO Maloney

## 2017-11-09 NOTE — PROGRESS NOTES
[facility] Medicine Services  PROGRESS NOTE    Patient Name: Joy Bueno  : 1950  MRN: [id number]    Date of Admission: 11/3/2017  Length of Stay: 2  Primary Care Physician: Trevon Delarosa MD    Subjective   Subjective     CC: f/u for abd pain and diarrhea    HPI: No acute events overnight, patient states that she is having some abdominal discomfort, otherwise she is without any new complain.    Review of Systems  Gen- No fevers, chills  CV- No chest pain, palpitations  Resp- No cough, dyspnea  GI- No N/V/D, abd pain    Otherwise ROS is negative except as mentioned in the HPI.    Objective   Objective     Vital Signs:   Temp:  [97 °F (36.1 °C)-98.4 °F (36.9 °C)] 97.6 °F (36.4 °C)  Heart Rate:  [81-90] 81  Resp:  [18-20] 18  BP: (147-169)/(64-78) 151/76      Physical Exam:  Constitutional: chronically ill non-toxic appearing lady in no acute distress, awake, alert  HENT: NCAT, mucous membranes moist  Respiratory: non-labored respirations, coarse BS, respiratory effort normal   Cardiovascular: RRR, no murmurs, rubs, or gallops, palpable pedal pulses bilaterally  Gastrointestinal: Obese, positive bowel sounds, soft, nontender, nondistended  Musculoskeletal: bilateral LE edema  Psychiatric: Appropriate affect, cooperative  Neurologic: Oriented x 3, strength symmetric in all extremities, Cranial Nerves grossly intact to confrontation, speech clear  Skin: No rashes    Results Reviewed:  I have personally reviewed current lab, radiology, and data and agree.    Results from last 7 days  Lab Units 17  0025   WBC 10*3/mm3 12.45*   HEMOGLOBIN g/dL 9.7*   HEMATOCRIT % 31.4*   PLATELETS 10*3/mm3 277       Results from last 7 days  Lab Units 17  0516 17  0451 17  0505  17  0026   SODIUM mmol/L 146 142 142  < > 145   POTASSIUM mmol/L 3.8 3.8 3.8  < > 3.2*   CHLORIDE mmol/L 111* 109 107  < > 108   CO2 mmol/L 28.0 26.0 29.0  < > 29.0   BUN mg/dL 51* 53* 50*  <  > 41*   CREATININE mg/dL 2.40* 2.30* 2.50*  < > 2.10*   GLUCOSE mg/dL 109* 112* 113*  < > 152*   CALCIUM mg/dL 8.3* 8.0* 7.7*  < > 8.7   ALT (SGPT) U/L  --   --   --   --  13   AST (SGOT) U/L  --   --   --   --  16   < > = values in this interval not displayed.  No results found for: BNP  No results found for: PHART    Microbiology Results Abnormal     Procedure Component Value - Date/Time    Urine Culture - Urine, Urine, Clean Catch [[id number]]  (Normal) Collected:  11/04/17 0215    Lab Status:  Final result Specimen:  Urine from Urine, Catheter Updated:  11/06/17 0808     Urine Culture No growth at 2 days    Clostridium Difficile Toxin - Stool, Per Rectum [[id number]] Collected:  11/04/17 0819    Lab Status:  Final result Specimen:  Stool from Per Rectum Updated:  11/04/17 1052    Narrative:       The following orders were created for panel order Clostridium Difficile Toxin - Stool, Per Rectum.  Procedure                               Abnormality         Status                     ---------                               -----------         ------                     Clostridium Difficile To...[[id number]]  Normal              Final result                 Please view results for these tests on the individual orders.    Clostridium Difficile Toxin, PCR - Stool, Per Rectum [[id number]]  (Normal) Collected:  11/04/17 0819    Lab Status:  Final result Specimen:  Stool from Per Rectum Updated:  11/04/17 1052     C. Difficile Toxins by PCR Not Detected    Narrative:         Performance characteristics of test not established for patients <2 years of age.  Negative for Toxigenic C. Difficile          Imaging Results (last 24 hours)     ** No results found for the last 24 hours. **        Results for orders placed during the hospital encounter of 09/02/17   Adult Transthoracic Echo Complete    Narrative · Left ventricular wall thickness is consistent with mild concentric   hypertrophy.  · Left atrial cavity size is mildly  dilated.  · Mild mitral valve regurgitation is present  · calcification of the aortic valve  · Mild tricuspid valve regurgitation is present.          I have reviewed the medications.    Assessment/Plan   Assessment / Plan     Hospital Problem List     * (Principal)Abdominal pain    Asthma    Hypertension    Morbid obesity    Anxiety    Anemia    Overview Addendum 8/28/2017 11:03 AM by Piyush Stokes MD     Iron deficient         CKD (chronic kidney disease) Stage 3    Overview Signed 8/28/2017 11:03 AM by Piyush Stokes MD     Followed by nephrology associates (improving)         Generalized weakness    Diabetes mellitus type 2, insulin dependent (Chronic)    Chronic heart failure (Chronic)         Brief Hospital Course to date:  Joy Bueno is a 67 y.o. female with history of recent CAUTI and C diff infection, recently finished PO Vanc course, sent to rehab, and recently discharged from rehab, been home few days, and brought back due to abd pain with diarrhea, but no BM recorded here after 2 days of hospitalization. No fever or chills. CT C/A/P showed moderate stool retention, as well as pleural effusion, which has improved when compared to previous exam. Still very weak and requires significant assistance with ADL.    Assessment & Plan:  HTN  - suboptimal at this time, but improved some, poss due to some pain/discomfort  - cont with current meds for now      Asthma  - no exac      Urinary retention/history of CAUTI  - etiology unclear, appreciate pharm assistance with reviewing poss medication culprit  - repeat UA neg      Recent C diff infection/abd pain/stool retention  - no diarrhea here, but pt reported she had some at home  - complete 14 day course of PO Vanc, will not repeat lab  - Bowel care PRN      DM2  - A1C 5.4, poss poor prognostic indicator vs false low due to chronic anemia  - SSI      Anxiety  - SSRI      Chronic DHF  - Creatinine bumped, so hold diuretic for now  - monitor strict  I&O      CKD  - base creatinine 1.5-2.2      Anemia  - chronic and stable      Morbid obesity  - multifactorial      Gen weakness/deconditioning  - multifactorial  - PT/OT consulted  - requires assistance with all aspects of ADL at this time  - will need CM/SW's assistance, CM working on home needs/equipments. SW made APS report for self neglect.      DVT Prophylaxis:  Hep sq      CODE STATUS: Full Code      Disposition: difficult disposition, was scheduled for d/c 11/8, but no one is at home to received pt. She is morbidly obese and is debilitated/bedbound. Had been to rehab before and does not want to go back. Daughter works 12 hr/day x 4 day/wk, so I don't think she will be able to care for pt much. I discussed this with pt, but she insisted that someone will be able to take care of her. I have not spoken to anyone and CM only spoke with daughter. Pt reported that dtr's boyfriend and neighbor will be able to help her, but I don't have confirmation from them . SW has filed case with APS for self neglect. I've also talked about possibility of needing to go to NH since she is severely debilitated at this time, but she declined. She is at high risks for complications/physical injury upon returning home. I would not be surprised if she is re-admitted within 1-2 weeks. Will continue to re-assess for a safe d/c. Per CM likely d/c in AM.    Paula Silver MD  11/09/17  12:23 PM

## 2017-11-10 VITALS
TEMPERATURE: 97.5 F | BODY MASS INDEX: 47.09 KG/M2 | DIASTOLIC BLOOD PRESSURE: 86 MMHG | HEART RATE: 76 BPM | OXYGEN SATURATION: 100 % | RESPIRATION RATE: 16 BRPM | SYSTOLIC BLOOD PRESSURE: 134 MMHG | WEIGHT: 293 LBS | HEIGHT: 66 IN

## 2017-11-10 LAB — GLUCOSE BLDC GLUCOMTR-MCNC: 114 MG/DL (ref 70–130)

## 2017-11-10 PROCEDURE — 94640 AIRWAY INHALATION TREATMENT: CPT

## 2017-11-10 PROCEDURE — 82962 GLUCOSE BLOOD TEST: CPT

## 2017-11-10 PROCEDURE — 94799 UNLISTED PULMONARY SVC/PX: CPT

## 2017-11-10 PROCEDURE — 25010000002 ONDANSETRON PER 1 MG: Performed by: INTERNAL MEDICINE

## 2017-11-10 PROCEDURE — 99239 HOSP IP/OBS DSCHRG MGMT >30: CPT | Performed by: NURSE PRACTITIONER

## 2017-11-10 RX ORDER — AMLODIPINE BESYLATE 10 MG/1
10 TABLET ORAL DAILY
Qty: 30 TABLET | Refills: 0 | Status: SHIPPED | OUTPATIENT
Start: 2017-11-10 | End: 2018-08-06 | Stop reason: HOSPADM

## 2017-11-10 RX ORDER — CASTOR OIL AND BALSAM, PERU 788; 87 MG/G; MG/G
1 OINTMENT TOPICAL EVERY 12 HOURS SCHEDULED
Start: 2017-11-10 | End: 2019-02-11

## 2017-11-10 RX ORDER — HYDRALAZINE HYDROCHLORIDE 100 MG/1
100 TABLET, FILM COATED ORAL 3 TIMES DAILY
Qty: 90 TABLET | Refills: 0 | Status: ON HOLD | OUTPATIENT
Start: 2017-11-10 | End: 2019-02-20 | Stop reason: SDUPTHER

## 2017-11-10 RX ORDER — HYDROCODONE BITARTRATE AND ACETAMINOPHEN 5; 325 MG/1; MG/1
1 TABLET ORAL EVERY 8 HOURS PRN
Qty: 6 TABLET | Refills: 0 | Status: SHIPPED | OUTPATIENT
Start: 2017-11-10 | End: 2018-08-06 | Stop reason: HOSPADM

## 2017-11-10 RX ORDER — CLONAZEPAM 0.5 MG/1
0.25 TABLET ORAL 2 TIMES DAILY PRN
Qty: 6 TABLET | Refills: 0 | Status: SHIPPED | OUTPATIENT
Start: 2017-11-10 | End: 2018-08-06 | Stop reason: HOSPADM

## 2017-11-10 RX ADMIN — ISOSORBIDE MONONITRATE 60 MG: 60 TABLET, EXTENDED RELEASE ORAL at 10:18

## 2017-11-10 RX ADMIN — BUDESONIDE AND FORMOTEROL FUMARATE DIHYDRATE 2 PUFF: 160; 4.5 AEROSOL RESPIRATORY (INHALATION) at 09:24

## 2017-11-10 RX ADMIN — MICONAZOLE NITRATE: 20 POWDER TOPICAL at 10:20

## 2017-11-10 RX ADMIN — ESCITALOPRAM OXALATE 10 MG: 10 TABLET ORAL at 10:18

## 2017-11-10 RX ADMIN — CASTOR OIL AND BALSAM, PERU: 788; 87 OINTMENT TOPICAL at 10:20

## 2017-11-10 RX ADMIN — IPRATROPIUM BROMIDE AND ALBUTEROL SULFATE 3 ML: .5; 3 SOLUTION RESPIRATORY (INHALATION) at 09:24

## 2017-11-10 RX ADMIN — MULTIPLE VITAMINS W/ MINERALS TAB 1 TABLET: TAB ORAL at 10:18

## 2017-11-10 RX ADMIN — ONDANSETRON 4 MG: 2 INJECTION INTRAMUSCULAR; INTRAVENOUS at 10:19

## 2017-11-10 RX ADMIN — Medication 250 MG: at 10:19

## 2017-11-10 RX ADMIN — HYDROCODONE BITARTATE AND ACETAMINOPHEN 1 TABLET: 5; 325 TABLET ORAL at 04:55

## 2017-11-10 RX ADMIN — FUROSEMIDE 40 MG: 40 TABLET ORAL at 10:18

## 2017-11-10 RX ADMIN — HYDROCODONE BITARTATE AND ACETAMINOPHEN 1 TABLET: 5; 325 TABLET ORAL at 10:19

## 2017-11-10 RX ADMIN — AMLODIPINE BESYLATE 10 MG: 10 TABLET ORAL at 10:19

## 2017-11-10 RX ADMIN — HYDRALAZINE HYDROCHLORIDE 100 MG: 25 TABLET ORAL at 10:19

## 2017-11-10 NOTE — PLAN OF CARE
Problem: Infection, Risk/Actual (Adult)  Goal: Infection Prevention/Resolution  Outcome: Ongoing (interventions implemented as appropriate)    Problem: Fall Risk (Adult)  Goal: Absence of Falls  Outcome: Ongoing (interventions implemented as appropriate)    Problem: Skin Integrity Impairment, Risk/Actual (Adult)  Goal: Skin Integrity/Wound Healing  Outcome: Ongoing (interventions implemented as appropriate)

## 2017-11-10 NOTE — DISCHARGE SUMMARY
[facility] Medicine Services  DISCHARGE SUMMARY    Patient Name: Joy Bueno  : 1950  MRN: [id number]    Date of Admission: 11/3/2017  Date of Discharge:    Length of Stay: 3  Primary Care Physician: Trevon Delarosa MD    Consults     No orders found from 10/5/2017 to 2017.        Hospital Course     Presenting Problem:   Abdominal pain, unspecified abdominal location [R10.9]  Abdominal pain, unspecified abdominal location [R10.9]    Active Hospital Problems (** Indicates Principal Problem)    Diagnosis Date Noted   • **Abdominal pain [R10.9] 2017   • Diabetes mellitus type 2, insulin dependent [E11.9, Z79.4] 2017   • Chronic heart failure [I50.9] 2017   • Generalized weakness [R53.1] 10/09/2017   • CKD (chronic kidney disease) Stage 3 [N18.9] 2017   • Anemia [D64.9] 2017   • Asthma [J45.909] 2017   • Hypertension [I10] 2017   • Morbid obesity [E66.01]    • Anxiety [F41.9]       Resolved Hospital Problems    Diagnosis Date Noted Date Resolved   No resolved problems to display.          Hospital Course:  Joy Bueno is a 67 y.o. female with recent history of a CVA UTI and C. difficile infection who recently finished a course of oral vancomycin and was sent to rehabilitation.  She was recently discharged from rehabilitation and had been home a few days and presented back to the hospital due to abdominal pain with diarrhea.  However patient did not have diarrhea here in the hospital.  Repeat C. difficile was negative.  She did have a CT scan of her abdomen pelvis that showed moderate stool retention as well as a pleural effusion which was improved when compared to previous exam.  She has been given a bowel regimen and again has had no diarrhea this hospitalization.  Her blood pressure was elevated and Norvasc was increased and hydralazine was added.  She has had generalized weakness and deconditioning.  Physical therapy  recommended rehabilitation however patient has refused.  Social work did make a APS report for self-neglect.  However APS states that nothing can be done as patient is able to make her own decisions.     She is morbidly obese and debilitated and bedbound.  Her daughter works 12 hour days 4 days a week so there is been concerned about the ability for her to care for patient.  This is been discussed at length with patient that she is has insisted that someone BLE to help take care of her.  Patient reports her daughter's boyfriend and neighbor will be able to help her.  Again nursing home and rehabilitation were discussed and patient refuses.  She is at high risk for complications/physical injury upon returning home. She has a high risk for readmission.    Case management has arranged for a lift to be delivered to patient's house.  She has also been set up with home health.           Day of Discharge     HPI: Has intermittent abdominal pain that is tolerable comes and goes.  Further diarrhea.  Wants to go home.      Review of Systems   Gen-no fevers, no chills  CV-no chest pain, no palpitations  Resp-no cough, no dyspnea  GI-no N/V/D,       Otherwise ROS is negative except as mentioned in the HPI.    Vital Signs:   Temp:  [97 °F (36.1 °C)-97.9 °F (36.6 °C)] 97.5 °F (36.4 °C)  Heart Rate:  [74-78] 76  Resp:  [16-18] 16  BP: (127-137)/(64-86) 134/86     Physical Exam:  Gen-no acute distress, resting in bed  CV-RRR, S1 S2 normal, no m/r/g  Resp-CTAB, no wheezes  Abd-soft, NT, ND, +BS  Ext-2+ pitting edema BLE  Neuro-A&Ox3, no focal deficits  Psych-appropriate mood      Pertinent  and/or Most Recent Results         Results from last 7 days  Lab Units 11/09/17  0516 11/08/17  0451 11/07/17  0505 11/06/17  0648 11/04/17  0026 11/04/17  0025   WBC 10*3/mm3  --   --   --   --   --  12.45*   HEMOGLOBIN g/dL  --   --   --   --   --  9.7*   HEMATOCRIT %  --   --   --   --   --  31.4*   PLATELETS 10*3/mm3  --   --   --   --   --   277   SODIUM mmol/L 146 142 142 143 145  --    POTASSIUM mmol/L 3.8 3.8 3.8 3.1* 3.2*  --    CHLORIDE mmol/L 111* 109 107 108 108  --    CO2 mmol/L 28.0 26.0 29.0 28.0 29.0  --    BUN mg/dL 51* 53* 50* 47* 41*  --    CREATININE mg/dL 2.40* 2.30* 2.50* 2.40* 2.10*  --    GLUCOSE mg/dL 109* 112* 113* 94 152*  --    CALCIUM mg/dL 8.3* 8.0* 7.7* 7.8* 8.7  --        Results from last 7 days  Lab Units 11/04/17  0026   BILIRUBIN mg/dL 0.5   ALK PHOS U/L 100   ALT (SGPT) U/L 13   AST (SGOT) U/L 16           Brief Urine Lab Results  (Last result in the past 365 days)      Color   Clarity   Blood   Leuk Est   Nitrite   Protein   CREAT   Urine HCG        11/04/17 0215 Yellow Cloudy(A) Small (1+)(A) Negative Negative >=300 mg/dL (3+)(A)               Microbiology Results Abnormal     Procedure Component Value - Date/Time    Urine Culture - Urine, Urine, Clean Catch [[id number]]  (Normal) Collected:  11/04/17 0215    Lab Status:  Final result Specimen:  Urine from Urine, Catheter Updated:  11/06/17 0808     Urine Culture No growth at 2 days    Clostridium Difficile Toxin - Stool, Per Rectum [[id number]] Collected:  11/04/17 0819    Lab Status:  Final result Specimen:  Stool from Per Rectum Updated:  11/04/17 1052    Narrative:       The following orders were created for panel order Clostridium Difficile Toxin - Stool, Per Rectum.  Procedure                               Abnormality         Status                     ---------                               -----------         ------                     Clostridium Difficile To...[[id number]]  Normal              Final result                 Please view results for these tests on the individual orders.    Clostridium Difficile Toxin, PCR - Stool, Per Rectum [[id number]]  (Normal) Collected:  11/04/17 0819    Lab Status:  Final result Specimen:  Stool from Per Rectum Updated:  11/04/17 1052     C. Difficile Toxins by PCR Not Detected    Narrative:         Performance characteristics  of test not established for patients <2 years of age.  Negative for Toxigenic C. Difficile          Imaging Results (all)     Procedure Component Value Units Date/Time    CT Abdomen Pelvis Without Contrast [[id number]] Collected:  11/04/17 0041     Updated:  11/04/17 0117    Narrative:       EXAM:    CT Abdomen and Pelvis Without Intravenous Contrast    CLINICAL HISTORY:    67 years old, female; Pain; Abdominal pain; Patient HX: No prior abd imaging   at this facility; Additional info: Lower abdominal pain    TECHNIQUE:    Axial computed tomography images of the abdomen and pelvis without   intravenous contrast.  All CT scans at this facility use one or more dose   reduction techniques, viz.: automated exposure control; ma/kV adjustment per   patient size (including targeted exams where dose is matched to indication;   i.e. head); or iterative reconstruction technique.    Coronal reformatted images were created and reviewed.    COMPARISON:    CT NEEDLE BIOPSY KIDNEY 2017-08-10 14:00    FINDINGS:    Limitations:  Evaluation of the soft tissues is somewhat limited by lack of   intravenous contrast.  Partial exclusion of the abdominal wall due to patient   body habitus.    Lower thorax:  Partially visualized moderate to large right pleural effusion.    Cardiomegaly.  Trace left pleural effusion.  Basal atelectasis.     ABDOMEN:    Liver:  Unremarkable.    Gallbladder and bile ducts:  Unremarkable.  No calcified stones.  No ductal   dilation.    Pancreas:  Unremarkable.  No ductal dilation.    Spleen:  Unremarkable.  No splenomegaly.    Adrenals:  Unremarkable.  No mass.    Kidneys and ureters:  Bilateral renal cysts.  Possible small hyperdense right   renal cyst.  No obstructing stones.  No hydronephrosis.    Stomach and bowel:  Overall moderate colonic stool.  Prominent stool within   the sigmoid colon and rectal vault.  No obstruction.  No mucosal thickening.    Appendix:  No findings to suggest acute  appendicitis.     PELVIS:    Bladder:  Unremarkable.  No stones.    Reproductive:  Possible uterine fibroids.     ABDOMEN and PELVIS:    Intraperitoneal space:  Fluid containing lower abdominal ventral hernia.  No   free air.    Bones/joints:  Mild degenerative disc disease is noted within the visualized   spine.  Mild bilateral hip DJD.  No acute fracture.  No dislocation.    Soft tissues:  Partially visualized body wall edema.    Vasculature:  Mild atherosclerosis.  No abdominal aortic aneurysm.    Lymph nodes:  Unremarkable.  No enlarged lymph nodes.      Impression:       1.  Fluid containing lower abdominal ventral hernia.  2.  Partially visualized moderate to large right pleural effusion.  3.  Overall moderate colonic stool.  Prominent stool within the sigmoid colon   and rectal vault.    THIS DOCUMENT HAS BEEN ELECTRONICALLY SIGNED BY ROSALIND JAQUEZ MD    CT Chest Without Contrast [[id number]] Collected:  11/04/17 0528     Updated:  11/04/17 0650    Narrative:       EXAM:    CT Chest Without Intravenous Contrast    EXAM DATE/TIME:    11/4/2017 5:28 AM    CLINICAL HISTORY:    67 years old, female; Unspecified abdominal pain; Diarrhea, unspecified;   Other malaise; Signs and symptoms; Dyspnea; Additional info: Rh base   pnm/effusion evaluation    TECHNIQUE:    Axial computed tomography images of the chest without intravenous contrast.    All CT scans at this facility use one or more dose reduction techniques, viz.:   automated exposure control; ma/kV adjustment per patient size (including   targeted exams where dose is matched to indication; i.e. head); or iterative   reconstruction technique.    Coronal reformatted images were created and reviewed.    COMPARISON:    CT CHEST WO CONTRAST 2017-09-02 12:49    FINDINGS:    Lungs:  Mild subpleural atelectasis right lower lobe. Calcified granuloma   right lower lobe.    Pleural space:  Edema left anterior chest wall. Small left and moderate right   pleural effusions.   No pneumothorax.    Heart:  Unremarkable.  No cardiomegaly.  No significant pericardial effusion.    Bones/joints:  Unremarkable.  No acute fracture.  No dislocation.    Soft tissues:  Unremarkable.    Vasculature:  Unremarkable.  No thoracic aortic aneurysm.    Lymph nodes:  Calcified right hilar and azygoesophageal recess nodes.      Impression:         Bilateral pleural effusions and mild compressive atelectasis right lower   lobe. Slight decreased right pleural effusion.    THIS DOCUMENT HAS BEEN ELECTRONICALLY SIGNED BY YANIV WILSON MD          Results for orders placed during the hospital encounter of 09/02/17   Adult Transthoracic Echo Complete    Narrative · Left ventricular wall thickness is consistent with mild concentric   hypertrophy.  · Left atrial cavity size is mildly dilated.  · Mild mitral valve regurgitation is present  · calcification of the aortic valve  · Mild tricuspid valve regurgitation is present.            Discharge Details      Myles Joy   Home Medication Instructions JESSICA:[id number]    Printed on:11/10/17 1013   Medication Information                      acetaminophen (TYLENOL) 325 MG tablet  Take 650 mg by mouth Every 4 (Four) Hours As Needed for Mild Pain (1-3).             albuterol (PROVENTIL HFA;VENTOLIN HFA) 108 (90 BASE) MCG/ACT inhaler  Inhale 2 puffs Every 4 (Four) Hours As Needed for Wheezing.             amLODIPine (NORVASC) 10 MG tablet  Take 1 tablet by mouth Daily.             atorvastatin (LIPITOR) 10 MG tablet  Take 10 mg by mouth Every Night.             castor oil-balsam peru (VENELEX) ointment  Apply 1 application topically Every 12 (Twelve) Hours.             Cholecalciferol (VITAMIN D3) 47062 units tablet  Take 50,000 Units by mouth Every 7 (Seven) Days.             clonazePAM (KlonoPIN) 0.5 MG tablet  Take 0.5 tablets by mouth 2 (Two) Times a Day As Needed for Anxiety.             escitalopram (LEXAPRO) 10 MG tablet  Take 1 tablet by mouth Daily.              furosemide (LASIX) 40 MG tablet  Take 40 mg by mouth 2 (Two) Times a Day.             hydrALAZINE (APRESOLINE) 100 MG tablet  Take 1 tablet by mouth 3 (Three) Times a Day.             HYDROcodone-acetaminophen (NORCO) 5-325 MG per tablet  Take 1 tablet by mouth Every 8 (Eight) Hours As Needed for Moderate Pain .             isosorbide mononitrate (IMDUR) 60 MG 24 hr tablet  Take 1 tablet by mouth Daily.             miconazole (MICOTIN) 2 % powder  Apply  topically Every 12 (Twelve) Hours.             mometasone-formoterol (DULERA 200) 200-5 MCG/ACT inhaler  Inhale 2 puffs 2 (Two) Times a Day.             Multiple Vitamins-Minerals (MULTIVITAMIN ADULTS PO)  Take 1 tablet by mouth Daily.             phenylephrine-shark liver oil-mineral oil-petrolatum (PREPARATION H) 0.25-3-14-71.9 % rectal ointment  Insert  into the rectum 3 (Three) Times a Day As Needed for Hemorrhoids.             saccharomyces boulardii (FLORASTOR) 250 MG capsule  Take 1 capsule by mouth 2 (Two) Times a Day.             simethicone (MYLICON) 80 MG chewable tablet  Chew 80 mg Every 6 (Six) Hours As Needed for Flatulence.                   Discharge Disposition:  Home with home health  Discharge Diet: cardiac, diabetic    Discharge Activity:  As tolerated      No future appointments.    Additional Instructions for the Follow-ups that You Need to Schedule     Ambulatory Referral to Home Health    As directed    Face to Face Visit Date:  11/7/2017   Follow-up Provider for Plan of Care?:  I treated the patient in an acute care facility and will not continue treatment after discharge.   Follow-up Provider:  ALBANIA RODRIGUEZ   Reason/Clinical Findings:  abdominal pain   Describe mobility limitations that make leaving home difficult:  generalized weakness, impaired physical mobility, anxiety, asthma, impaired functional mobility/ADL's   Nursing/Therapeutic Services Requested:   Physical Therapy  Occupational Therapy  Skilled Nursing      Skilled  nursing orders:   Medication education  Telehealth  Cardiopulmonary assessments  CHF management      PT orders:   Therapeutic exercise  Gait Training  Transfer training  Strengthening  Home safety assessment      Weight Bearing Status:  As Tolerated   Occupational orders:   Activities of daily living  Energy conservation  Strengthening  Home safety assessment          Discharge Follow-up with PCP    As directed    Follow Up Details:  PCP in a few days                 Time Spent on Discharge: 40 minutes    SUELLEN Granado  11/10/17  10:13 AM

## 2017-12-07 ENCOUNTER — LAB REQUISITION (OUTPATIENT)
Dept: LAB | Facility: HOSPITAL | Age: 67
End: 2017-12-07

## 2017-12-07 DIAGNOSIS — Z00.00 ROUTINE GENERAL MEDICAL EXAMINATION AT A HEALTH CARE FACILITY: ICD-10-CM

## 2017-12-07 LAB
ALBUMIN SERPL-MCNC: 2.7 G/DL (ref 3.2–4.8)
ALBUMIN/GLOB SERPL: 1.1 G/DL (ref 1.5–2.5)
ALP SERPL-CCNC: 81 U/L (ref 25–100)
ALT SERPL W P-5'-P-CCNC: 11 U/L (ref 7–40)
ANION GAP SERPL CALCULATED.3IONS-SCNC: 4 MMOL/L (ref 3–11)
AST SERPL-CCNC: 13 U/L (ref 0–33)
BASOPHILS # BLD AUTO: 0.01 10*3/MM3 (ref 0–0.2)
BASOPHILS NFR BLD AUTO: 0.1 % (ref 0–1)
BILIRUB SERPL-MCNC: 0.4 MG/DL (ref 0.3–1.2)
BUN BLD-MCNC: 57 MG/DL (ref 9–23)
BUN/CREAT SERPL: 23.8 (ref 7–25)
CALCIUM SPEC-SCNC: 8.2 MG/DL (ref 8.7–10.4)
CHLORIDE SERPL-SCNC: 107 MMOL/L (ref 99–109)
CO2 SERPL-SCNC: 33 MMOL/L (ref 20–31)
CREAT BLD-MCNC: 2.4 MG/DL (ref 0.6–1.3)
DEPRECATED RDW RBC AUTO: 51.8 FL (ref 37–54)
EOSINOPHIL # BLD AUTO: 0.36 10*3/MM3 (ref 0–0.3)
EOSINOPHIL NFR BLD AUTO: 4.2 % (ref 0–3)
ERYTHROCYTE [DISTWIDTH] IN BLOOD BY AUTOMATED COUNT: 14.6 % (ref 11.3–14.5)
GFR SERPL CREATININE-BSD FRML MDRD: 24 ML/MIN/1.73
GLOBULIN UR ELPH-MCNC: 2.5 GM/DL
GLUCOSE BLD-MCNC: 111 MG/DL (ref 70–100)
HCT VFR BLD AUTO: 29.4 % (ref 34.5–44)
HGB BLD-MCNC: 9 G/DL (ref 11.5–15.5)
IMM GRANULOCYTES # BLD: 0.02 10*3/MM3 (ref 0–0.03)
IMM GRANULOCYTES NFR BLD: 0.2 % (ref 0–0.6)
LYMPHOCYTES # BLD AUTO: 1.42 10*3/MM3 (ref 0.6–4.8)
LYMPHOCYTES NFR BLD AUTO: 16.7 % (ref 24–44)
MCH RBC QN AUTO: 29.6 PG (ref 27–31)
MCHC RBC AUTO-ENTMCNC: 30.6 G/DL (ref 32–36)
MCV RBC AUTO: 96.7 FL (ref 80–99)
MONOCYTES # BLD AUTO: 0.67 10*3/MM3 (ref 0–1)
MONOCYTES NFR BLD AUTO: 7.9 % (ref 0–12)
NEUTROPHILS # BLD AUTO: 6.04 10*3/MM3 (ref 1.5–8.3)
NEUTROPHILS NFR BLD AUTO: 70.9 % (ref 41–71)
PLATELET # BLD AUTO: 184 10*3/MM3 (ref 150–450)
PMV BLD AUTO: 10.4 FL (ref 6–12)
POTASSIUM BLD-SCNC: 3.6 MMOL/L (ref 3.5–5.5)
PROT SERPL-MCNC: 5.2 G/DL (ref 5.7–8.2)
RBC # BLD AUTO: 3.04 10*6/MM3 (ref 3.89–5.14)
SODIUM BLD-SCNC: 144 MMOL/L (ref 132–146)
WBC NRBC COR # BLD: 8.52 10*3/MM3 (ref 3.5–10.8)

## 2017-12-07 PROCEDURE — 85025 COMPLETE CBC W/AUTO DIFF WBC: CPT

## 2017-12-07 PROCEDURE — 80053 COMPREHEN METABOLIC PANEL: CPT

## 2018-08-02 ENCOUNTER — HOSPITAL ENCOUNTER (INPATIENT)
Facility: HOSPITAL | Age: 68
LOS: 4 days | Discharge: NURSING, REHAB OR LONG-TERM CARE | End: 2018-08-06
Attending: EMERGENCY MEDICINE | Admitting: HOSPITALIST

## 2018-08-02 DIAGNOSIS — R53.83 OTHER FATIGUE: ICD-10-CM

## 2018-08-02 DIAGNOSIS — N18.4 ACUTE RENAL FAILURE SUPERIMPOSED ON STAGE 4 CHRONIC KIDNEY DISEASE, UNSPECIFIED ACUTE RENAL FAILURE TYPE (HCC): ICD-10-CM

## 2018-08-02 DIAGNOSIS — D63.1 ANEMIA IN STAGE 4 CHRONIC KIDNEY DISEASE (HCC): Primary | ICD-10-CM

## 2018-08-02 DIAGNOSIS — N17.9 ACUTE RENAL FAILURE SUPERIMPOSED ON STAGE 4 CHRONIC KIDNEY DISEASE, UNSPECIFIED ACUTE RENAL FAILURE TYPE (HCC): ICD-10-CM

## 2018-08-02 DIAGNOSIS — N18.4 ANEMIA IN STAGE 4 CHRONIC KIDNEY DISEASE (HCC): Primary | ICD-10-CM

## 2018-08-02 DIAGNOSIS — R53.1 GENERALIZED WEAKNESS: ICD-10-CM

## 2018-08-02 LAB
ABO GROUP BLD: NORMAL
ALBUMIN SERPL-MCNC: 3.83 G/DL (ref 3.2–4.8)
ALBUMIN/GLOB SERPL: 0.9 G/DL (ref 1.5–2.5)
ALP SERPL-CCNC: 110 U/L (ref 25–100)
ALT SERPL W P-5'-P-CCNC: 11 U/L (ref 7–40)
ANION GAP SERPL CALCULATED.3IONS-SCNC: 11 MMOL/L (ref 3–11)
APTT PPP: 27.5 SECONDS (ref 24–31)
AST SERPL-CCNC: 9 U/L (ref 0–33)
BASOPHILS # BLD AUTO: 0.02 10*3/MM3 (ref 0–0.2)
BASOPHILS NFR BLD AUTO: 0.2 % (ref 0–1)
BILIRUB SERPL-MCNC: 0.3 MG/DL (ref 0.3–1.2)
BLD GP AB SCN SERPL QL: NEGATIVE
BUN BLD-MCNC: 116 MG/DL (ref 9–23)
BUN/CREAT SERPL: 30.4 (ref 7–25)
CALCIUM SPEC-SCNC: 9.2 MG/DL (ref 8.7–10.4)
CHLORIDE SERPL-SCNC: 106 MMOL/L (ref 99–109)
CO2 SERPL-SCNC: 26 MMOL/L (ref 20–31)
CREAT BLD-MCNC: 3.82 MG/DL (ref 0.6–1.3)
DEPRECATED RDW RBC AUTO: 47.5 FL (ref 37–54)
DEVELOPER EXPIRATION DATE: NORMAL
DEVELOPER LOT NUMBER: NORMAL
EOSINOPHIL # BLD AUTO: 0.65 10*3/MM3 (ref 0–0.3)
EOSINOPHIL NFR BLD AUTO: 5.6 % (ref 0–3)
ERYTHROCYTE [DISTWIDTH] IN BLOOD BY AUTOMATED COUNT: 13.5 % (ref 11.3–14.5)
EXPIRATION DATE: NORMAL
FECAL OCCULT BLOOD SCREEN, POC: NEGATIVE
GFR SERPL CREATININE-BSD FRML MDRD: 14 ML/MIN/1.73
GLOBULIN UR ELPH-MCNC: 4.1 GM/DL
GLUCOSE BLD-MCNC: 164 MG/DL (ref 70–100)
HCT VFR BLD AUTO: 23.8 % (ref 34.5–44)
HCT VFR BLD AUTO: 25.5 % (ref 34.5–44)
HGB BLD-MCNC: 7.6 G/DL (ref 11.5–15.5)
HGB BLD-MCNC: 8.3 G/DL (ref 11.5–15.5)
IMM GRANULOCYTES # BLD: 0.14 10*3/MM3 (ref 0–0.03)
IMM GRANULOCYTES NFR BLD: 1.2 % (ref 0–0.6)
INR PPP: 0.97 (ref 0.91–1.09)
LYMPHOCYTES # BLD AUTO: 2.06 10*3/MM3 (ref 0.6–4.8)
LYMPHOCYTES NFR BLD AUTO: 17.8 % (ref 24–44)
Lab: NORMAL
MCH RBC QN AUTO: 31.7 PG (ref 27–31)
MCHC RBC AUTO-ENTMCNC: 32.5 G/DL (ref 32–36)
MCV RBC AUTO: 97.3 FL (ref 80–99)
MONOCYTES # BLD AUTO: 0.42 10*3/MM3 (ref 0–1)
MONOCYTES NFR BLD AUTO: 3.6 % (ref 0–12)
NEGATIVE CONTROL: NEGATIVE
NEUTROPHILS # BLD AUTO: 8.3 10*3/MM3 (ref 1.5–8.3)
NEUTROPHILS NFR BLD AUTO: 71.6 % (ref 41–71)
PLATELET # BLD AUTO: 270 10*3/MM3 (ref 150–450)
PMV BLD AUTO: 9.7 FL (ref 6–12)
POSITIVE CONTROL: POSITIVE
POTASSIUM BLD-SCNC: 3.6 MMOL/L (ref 3.5–5.5)
PROT SERPL-MCNC: 7.9 G/DL (ref 5.7–8.2)
PROTHROMBIN TIME: 10.2 SECONDS (ref 9.6–11.5)
RBC # BLD AUTO: 2.62 10*6/MM3 (ref 3.89–5.14)
RH BLD: POSITIVE
SODIUM BLD-SCNC: 143 MMOL/L (ref 132–146)
T&S EXPIRATION DATE: NORMAL
WBC NRBC COR # BLD: 11.59 10*3/MM3 (ref 3.5–10.8)

## 2018-08-02 PROCEDURE — 86923 COMPATIBILITY TEST ELECTRIC: CPT

## 2018-08-02 PROCEDURE — 82270 OCCULT BLOOD FECES: CPT | Performed by: PHYSICIAN ASSISTANT

## 2018-08-02 PROCEDURE — 99285 EMERGENCY DEPT VISIT HI MDM: CPT

## 2018-08-02 PROCEDURE — 85025 COMPLETE CBC W/AUTO DIFF WBC: CPT | Performed by: PHYSICIAN ASSISTANT

## 2018-08-02 PROCEDURE — 86901 BLOOD TYPING SEROLOGIC RH(D): CPT | Performed by: PHYSICIAN ASSISTANT

## 2018-08-02 PROCEDURE — 80053 COMPREHEN METABOLIC PANEL: CPT | Performed by: PHYSICIAN ASSISTANT

## 2018-08-02 PROCEDURE — 85730 THROMBOPLASTIN TIME PARTIAL: CPT | Performed by: PHYSICIAN ASSISTANT

## 2018-08-02 PROCEDURE — 85610 PROTHROMBIN TIME: CPT | Performed by: PHYSICIAN ASSISTANT

## 2018-08-02 PROCEDURE — 86900 BLOOD TYPING SEROLOGIC ABO: CPT | Performed by: PHYSICIAN ASSISTANT

## 2018-08-02 PROCEDURE — 86850 RBC ANTIBODY SCREEN: CPT | Performed by: PHYSICIAN ASSISTANT

## 2018-08-02 PROCEDURE — 85018 HEMOGLOBIN: CPT | Performed by: PHYSICIAN ASSISTANT

## 2018-08-02 PROCEDURE — 85014 HEMATOCRIT: CPT | Performed by: PHYSICIAN ASSISTANT

## 2018-08-02 RX ORDER — METOLAZONE 5 MG/1
5 TABLET ORAL DAILY
COMMUNITY
End: 2018-08-06 | Stop reason: HOSPADM

## 2018-08-02 RX ORDER — PROMETHAZINE HYDROCHLORIDE 25 MG/1
25 TABLET ORAL EVERY 6 HOURS PRN
COMMUNITY
End: 2019-04-05

## 2018-08-02 RX ORDER — MULTIVIT WITH MINERALS/LUTEIN
250 TABLET ORAL DAILY
COMMUNITY
End: 2018-10-23 | Stop reason: HOSPADM

## 2018-08-02 RX ORDER — GLIPIZIDE 5 MG/1
5 TABLET, FILM COATED, EXTENDED RELEASE ORAL DAILY
COMMUNITY
End: 2018-08-06 | Stop reason: HOSPADM

## 2018-08-02 RX ORDER — FERROUS SULFATE 325(65) MG
325 TABLET ORAL DAILY
COMMUNITY
End: 2018-10-23 | Stop reason: HOSPADM

## 2018-08-02 RX ORDER — BUMETANIDE 2 MG/1
2 TABLET ORAL 2 TIMES DAILY
COMMUNITY
End: 2018-08-06 | Stop reason: HOSPADM

## 2018-08-02 RX ORDER — LORATADINE 10 MG/1
10 TABLET ORAL DAILY
COMMUNITY
End: 2019-02-11

## 2018-08-02 RX ORDER — HYDRALAZINE HYDROCHLORIDE 25 MG/1
25 TABLET, FILM COATED ORAL 3 TIMES DAILY
COMMUNITY
End: 2018-08-06 | Stop reason: HOSPADM

## 2018-08-02 RX ORDER — SODIUM CHLORIDE 0.9 % (FLUSH) 0.9 %
10 SYRINGE (ML) INJECTION AS NEEDED
Status: DISCONTINUED | OUTPATIENT
Start: 2018-08-02 | End: 2018-08-06 | Stop reason: HOSPADM

## 2018-08-02 RX ORDER — UREA 10 %
3 LOTION (ML) TOPICAL NIGHTLY
Status: ON HOLD | COMMUNITY
End: 2018-10-23

## 2018-08-02 RX ORDER — POTASSIUM CHLORIDE 20 MEQ/1
20 TABLET, EXTENDED RELEASE ORAL DAILY
COMMUNITY
End: 2018-10-23 | Stop reason: HOSPADM

## 2018-08-02 RX ORDER — CARVEDILOL 25 MG/1
25 TABLET ORAL 2 TIMES DAILY WITH MEALS
COMMUNITY
End: 2020-07-08 | Stop reason: HOSPADM

## 2018-08-02 RX ORDER — POLYETHYLENE GLYCOL 3350 17 G/17G
17 POWDER, FOR SOLUTION ORAL DAILY PRN
COMMUNITY
End: 2019-04-27

## 2018-08-02 RX ORDER — RISPERIDONE 0.25 MG/1
0.12 TABLET ORAL
COMMUNITY
End: 2019-02-11

## 2018-08-02 RX ORDER — ISOSORBIDE MONONITRATE 30 MG/1
30 TABLET, EXTENDED RELEASE ORAL DAILY
COMMUNITY
End: 2019-10-02 | Stop reason: HOSPADM

## 2018-08-02 RX ORDER — CALCITRIOL 0.25 UG/1
0.25 CAPSULE, LIQUID FILLED ORAL DAILY
COMMUNITY
End: 2021-02-17 | Stop reason: HOSPADM

## 2018-08-02 RX ORDER — PANTOPRAZOLE SODIUM 40 MG/1
40 TABLET, DELAYED RELEASE ORAL DAILY
COMMUNITY
End: 2021-02-17 | Stop reason: HOSPADM

## 2018-08-02 RX ADMIN — Medication 10 ML: at 18:59

## 2018-08-02 RX ADMIN — SODIUM CHLORIDE 1000 ML: 9 INJECTION, SOLUTION INTRAVENOUS at 20:09

## 2018-08-02 NOTE — ED PROVIDER NOTES
"Subjective   Joy Bueno is a 68 y.o. female who presents to the ED for low H&H per her nursing home ([facility]) today.   Her H&H was 7.3/22.3.   This facility additionally notes elevated CREAT/BUN levels and feels the pt needs transfusion.   The patient herself notes PMHx of DM, chronic renal disease, and an unspecified heart condition. She currently complains only of generalized fatigue, while denying abdominal pain, melena, dysuria, or any other acute complaints at this time.     Mrs. Bueno does mention that her H&H levels have been low in the past (approximately one year ago). Additionally, she denies any use of blood thinners and notes that she only uses Tylenol for pain relief as advised by her PCP.   She has seen a nephrologist \"about a year ago\" but can't recall his name.            History provided by:  Patient  Illness   Quality:  Abnormal lab (low H&H, elevated CREAT/BUN)  Severity:  Moderate  Timing:  Constant  Chronicity:  Recurrent  Context:  Pt is referred to ED from [facility] following low H&H levels for a blood transfusion.  Associated symptoms: fatigue    Associated symptoms: no abdominal pain        Review of Systems   Constitutional: Positive for fatigue.   Gastrointestinal: Negative for abdominal pain and blood in stool.   Genitourinary: Negative for dysuria.   All other systems reviewed and are negative.      Past Medical History:   Diagnosis Date   • Anemia    • Anxiety    • Asthma    • Diabetes mellitus (CMS/HCC)    • Hypertension    • Morbid obesity (CMS/HCC)    • Osteoarthritis        Allergies   Allergen Reactions   • Geodon [Ziprasidone Hcl] Unknown (See Comments)     PT DOESN'T REMEMBER   • Haldol [Haloperidol Lactate] Unknown (See Comments)     PT DOESN'T REMEMBER   • Seroquel [Quetiapine Fumarate] Unknown (See Comments)     PT DOESN'T REMEMBER       Past Surgical History:   Procedure Laterality Date   •  SECTION     • COLONOSCOPY N/A 2017    Procedure: " COLONOSCOPY;  Surgeon: Mark I Brunner, MD;  Location:  [facility];  Service:    • ENDOSCOPY N/A 8/25/2017    Procedure: ESOPHAGOGASTRODUODENOSCOPY ;  Surgeon: Velasquez Call MD;  Location:  [facility];  Service:    • HERNIA REPAIR         History reviewed. No pertinent family history.    Social History     Social History   • Marital status:      Social History Main Topics   • Smoking status: Former Smoker   • Alcohol use No   • Drug use: No   • Sexual activity: No     Other Topics Concern   • Not on file     Social History Narrative    lIVES ALONE.  PATIENT STATES SHE HAS HOME HEALTH COME IN.         Objective   Physical Exam   Constitutional: She is oriented to person, place, and time. She appears well-developed and well-nourished. No distress.   HENT:   Head: Normocephalic and atraumatic.   Right Ear: External ear normal.   Left Ear: External ear normal.   Nose: Nose normal.   Eyes: Pupils are equal, round, and reactive to light. Conjunctivae and EOM are normal. No scleral icterus.   Neck: Normal range of motion. Neck supple.   Cardiovascular: Normal rate, regular rhythm and normal heart sounds.    No murmur heard.  Pulmonary/Chest: Effort normal and breath sounds normal. No respiratory distress.   Lungs CTAB.   Abdominal: Soft. Bowel sounds are normal. There is no tenderness.   Musculoskeletal: Normal range of motion. She exhibits edema (Trace BLE edema). She exhibits no deformity.   Neurological: She is alert and oriented to person, place, and time.   Skin: Skin is warm and dry.   Psychiatric: She has a normal mood and affect. Her behavior is normal.   Nursing note and vitals reviewed.      Procedures         ED Course   Pt has an H&H of 8.3/25.5 which appears to be chronic.  I hydrated with a liter of saline and rechecked an H&H and it is now 7.6/23.8.  She is guaic negative on her rectal exam.  Her renal functions are worse than her baseline now at 116/3.82.  She has not seen a nephrologist  since last year and can't recall the name of her last nephrologist.  I spoke with Dr. Guzman who felt she should probably come in for admission.  Discussed with Dr. Dimas (hospitalist) who agrees to admit to [facility].  Recent Results (from the past 24 hour(s))   Comprehensive Metabolic Panel    Collection Time: 08/02/18  6:58 PM   Result Value Ref Range    Glucose 164 (H) 70 - 100 mg/dL     (H) 9 - 23 mg/dL    Creatinine 3.82 (H) 0.60 - 1.30 mg/dL    Sodium 143 132 - 146 mmol/L    Potassium 3.6 3.5 - 5.5 mmol/L    Chloride 106 99 - 109 mmol/L    CO2 26.0 20.0 - 31.0 mmol/L    Calcium 9.2 8.7 - 10.4 mg/dL    Total Protein 7.9 5.7 - 8.2 g/dL    Albumin 3.83 3.20 - 4.80 g/dL    ALT (SGPT) 11 7 - 40 U/L    AST (SGOT) 9 0 - 33 U/L    Alkaline Phosphatase 110 (H) 25 - 100 U/L    Total Bilirubin 0.3 0.3 - 1.2 mg/dL    eGFR  African Amer 14 (L) >60 mL/min/1.73    Globulin 4.1 gm/dL    A/G Ratio 0.9 (L) 1.5 - 2.5 g/dL    BUN/Creatinine Ratio 30.4 (H) 7.0 - 25.0    Anion Gap 11.0 3.0 - 11.0 mmol/L   Protime-INR    Collection Time: 08/02/18  6:58 PM   Result Value Ref Range    Protime 10.2 9.6 - 11.5 Seconds    INR 0.97 0.91 - 1.09   aPTT    Collection Time: 08/02/18  6:58 PM   Result Value Ref Range    PTT 27.5 24.0 - 31.0 seconds   Type & Screen    Collection Time: 08/02/18  6:58 PM   Result Value Ref Range    ABO Type B     RH type Positive     Antibody Screen Negative     T&S Expiration Date 8/5/2018 11:59:59 PM    CBC Auto Differential    Collection Time: 08/02/18  6:58 PM   Result Value Ref Range    WBC 11.59 (H) 3.50 - 10.80 10*3/mm3    RBC 2.62 (L) 3.89 - 5.14 10*6/mm3    Hemoglobin 8.3 (L) 11.5 - 15.5 g/dL    Hematocrit 25.5 (L) 34.5 - 44.0 %    MCV 97.3 80.0 - 99.0 fL    MCH 31.7 (H) 27.0 - 31.0 pg    MCHC 32.5 32.0 - 36.0 g/dL    RDW 13.5 11.3 - 14.5 %    RDW-SD 47.5 37.0 - 54.0 fl    MPV 9.7 6.0 - 12.0 fL    Platelets 270 150 - 450 10*3/mm3    Neutrophil % 71.6 (H) 41.0 - 71.0 %    Lymphocyte % 17.8 (L) 24.0  - 44.0 %    Monocyte % 3.6 0.0 - 12.0 %    Eosinophil % 5.6 (H) 0.0 - 3.0 %    Basophil % 0.2 0.0 - 1.0 %    Immature Grans % 1.2 (H) 0.0 - 0.6 %    Neutrophils, Absolute 8.30 1.50 - 8.30 10*3/mm3    Lymphocytes, Absolute 2.06 0.60 - 4.80 10*3/mm3    Monocytes, Absolute 0.42 0.00 - 1.00 10*3/mm3    Eosinophils, Absolute 0.65 (H) 0.00 - 0.30 10*3/mm3    Basophils, Absolute 0.02 0.00 - 0.20 10*3/mm3    Immature Grans, Absolute 0.14 (H) 0.00 - 0.03 10*3/mm3   POCT Occult Blood, stool    Collection Time: 08/02/18  8:11 PM   Result Value Ref Range    Fecal Occult Blood Negative Negative    Lot Number [id number]     Expiration Date 1-21     DEVELOPER LOT NUMBER [id number]     DEVELOPER EXPIRATION DATE 2,021-7     Positive Control Positive Positive    Negative Control Negative Negative   Hemoglobin & Hematocrit, Blood    Collection Time: 08/02/18  9:47 PM   Result Value Ref Range    Hemoglobin 7.6 (L) 11.5 - 15.5 g/dL    Hematocrit 23.8 (L) 34.5 - 44.0 %     Note: In addition to lab results from this visit, the labs listed above may include labs taken at another facility or during a different encounter within the last 24 hours. Please correlate lab times with ED admission and discharge times for further clarification of the services performed during this visit.    No orders to display     Vitals:    08/02/18 1900 08/02/18 1930 08/02/18 2000 08/02/18 2200   BP: 146/76 156/77 161/82 180/93   Pulse: 71 71 71 64   Resp: 16      Temp:       TempSrc:       SpO2: 97% 98% 98% 99%   Weight:       Height:         Medications   sodium chloride 0.9 % flush 10 mL (10 mL Intravenous Given 8/2/18 1859)   sodium chloride 0.9 % bolus 1,000 mL (0 mL Intravenous Stopped 8/2/18 2130)     ECG/EMG Results (last 24 hours)     ** No results found for the last 24 hours. **            Recent Results (from the past 24 hour(s))   Comprehensive Metabolic Panel    Collection Time: 08/02/18  6:58 PM   Result Value Ref Range    Glucose 164 (H) 70 - 100 mg/dL      (H) 9 - 23 mg/dL    Creatinine 3.82 (H) 0.60 - 1.30 mg/dL    Sodium 143 132 - 146 mmol/L    Potassium 3.6 3.5 - 5.5 mmol/L    Chloride 106 99 - 109 mmol/L    CO2 26.0 20.0 - 31.0 mmol/L    Calcium 9.2 8.7 - 10.4 mg/dL    Total Protein 7.9 5.7 - 8.2 g/dL    Albumin 3.83 3.20 - 4.80 g/dL    ALT (SGPT) 11 7 - 40 U/L    AST (SGOT) 9 0 - 33 U/L    Alkaline Phosphatase 110 (H) 25 - 100 U/L    Total Bilirubin 0.3 0.3 - 1.2 mg/dL    eGFR  African Amer 14 (L) >60 mL/min/1.73    Globulin 4.1 gm/dL    A/G Ratio 0.9 (L) 1.5 - 2.5 g/dL    BUN/Creatinine Ratio 30.4 (H) 7.0 - 25.0    Anion Gap 11.0 3.0 - 11.0 mmol/L   Protime-INR    Collection Time: 08/02/18  6:58 PM   Result Value Ref Range    Protime 10.2 9.6 - 11.5 Seconds    INR 0.97 0.91 - 1.09   aPTT    Collection Time: 08/02/18  6:58 PM   Result Value Ref Range    PTT 27.5 24.0 - 31.0 seconds   Type & Screen    Collection Time: 08/02/18  6:58 PM   Result Value Ref Range    ABO Type B     RH type Positive     Antibody Screen Negative     T&S Expiration Date 8/5/2018 11:59:59 PM    CBC Auto Differential    Collection Time: 08/02/18  6:58 PM   Result Value Ref Range    WBC 11.59 (H) 3.50 - 10.80 10*3/mm3    RBC 2.62 (L) 3.89 - 5.14 10*6/mm3    Hemoglobin 8.3 (L) 11.5 - 15.5 g/dL    Hematocrit 25.5 (L) 34.5 - 44.0 %    MCV 97.3 80.0 - 99.0 fL    MCH 31.7 (H) 27.0 - 31.0 pg    MCHC 32.5 32.0 - 36.0 g/dL    RDW 13.5 11.3 - 14.5 %    RDW-SD 47.5 37.0 - 54.0 fl    MPV 9.7 6.0 - 12.0 fL    Platelets 270 150 - 450 10*3/mm3    Neutrophil % 71.6 (H) 41.0 - 71.0 %    Lymphocyte % 17.8 (L) 24.0 - 44.0 %    Monocyte % 3.6 0.0 - 12.0 %    Eosinophil % 5.6 (H) 0.0 - 3.0 %    Basophil % 0.2 0.0 - 1.0 %    Immature Grans % 1.2 (H) 0.0 - 0.6 %    Neutrophils, Absolute 8.30 1.50 - 8.30 10*3/mm3    Lymphocytes, Absolute 2.06 0.60 - 4.80 10*3/mm3    Monocytes, Absolute 0.42 0.00 - 1.00 10*3/mm3    Eosinophils, Absolute 0.65 (H) 0.00 - 0.30 10*3/mm3    Basophils, Absolute 0.02 0.00 -  0.20 10*3/mm3    Immature Grans, Absolute 0.14 (H) 0.00 - 0.03 10*3/mm3   POCT Occult Blood, stool    Collection Time: 08/02/18  8:11 PM   Result Value Ref Range    Fecal Occult Blood Negative Negative    Lot Number [id number]     Expiration Date 1-21     DEVELOPER LOT NUMBER [id number]     DEVELOPER EXPIRATION DATE 2,021-7     Positive Control Positive Positive    Negative Control Negative Negative   Hemoglobin & Hematocrit, Blood    Collection Time: 08/02/18  9:47 PM   Result Value Ref Range    Hemoglobin 7.6 (L) 11.5 - 15.5 g/dL    Hematocrit 23.8 (L) 34.5 - 44.0 %     Note: In addition to lab results from this visit, the labs listed above may include labs taken at another facility or during a different encounter within the last 24 hours. Please correlate lab times with ED admission and discharge times for further clarification of the services performed during this visit.    No orders to display     Vitals:    08/02/18 1900 08/02/18 1930 08/02/18 2000 08/02/18 2200   BP: 146/76 156/77 161/82 180/93   Pulse: 71 71 71 64   Resp: 16      Temp:       TempSrc:       SpO2: 97% 98% 98% 99%   Weight:       Height:         Medications   sodium chloride 0.9 % flush 10 mL (10 mL Intravenous Given 8/2/18 1859)   sodium chloride 0.9 % bolus 1,000 mL (0 mL Intravenous Stopped 8/2/18 2130)     ECG/EMG Results (last 24 hours)     ** No results found for the last 24 hours. **                      [facility]    Final diagnoses:   Anemia in stage 4 chronic kidney disease (CMS/HCC)   Acute renal failure superimposed on stage 4 chronic kidney disease, unspecified acute renal failure type (CMS/HCC)   Generalized weakness   Other fatigue       Documentation assistance provided by abelardo Priest.  Information recorded by the abelardo was done at my direction and has been verified and validated by me.     Saurav Priest  08/02/18 7519       Saurav Priest  08/02/18 0591       Jimi Ricks, PA  08/02/18 8986

## 2018-08-03 PROBLEM — I10 HYPERTENSION: Chronic | Status: ACTIVE | Noted: 2017-08-03

## 2018-08-03 PROBLEM — Z87.09 HISTORY OF RESPIRATORY FAILURE: Status: ACTIVE | Noted: 2018-08-03

## 2018-08-03 PROBLEM — Z86.19 HISTORY OF CLOSTRIDIUM DIFFICILE INFECTION: Status: ACTIVE | Noted: 2018-08-03

## 2018-08-03 PROBLEM — Z87.440 HISTORY OF UTI: Status: ACTIVE | Noted: 2018-08-03

## 2018-08-03 PROBLEM — B35.0 TINEA CAPITIS: Status: ACTIVE | Noted: 2018-08-03

## 2018-08-03 PROBLEM — E11.9 DIABETES MELLITUS, TYPE II (HCC): Chronic | Status: ACTIVE | Noted: 2018-08-03

## 2018-08-03 LAB
ANION GAP SERPL CALCULATED.3IONS-SCNC: 12 MMOL/L (ref 3–11)
BACTERIA UR QL AUTO: ABNORMAL /HPF
BASOPHILS # BLD AUTO: 0.03 10*3/MM3 (ref 0–0.2)
BASOPHILS NFR BLD AUTO: 0.2 % (ref 0–1)
BILIRUB UR QL STRIP: NEGATIVE
BUN BLD-MCNC: 114 MG/DL (ref 9–23)
BUN/CREAT SERPL: 31.8 (ref 7–25)
CALCIUM SPEC-SCNC: 8.2 MG/DL (ref 8.7–10.4)
CHLORIDE SERPL-SCNC: 107 MMOL/L (ref 99–109)
CLARITY UR: ABNORMAL
CO2 SERPL-SCNC: 24 MMOL/L (ref 20–31)
COLOR UR: YELLOW
CREAT BLD-MCNC: 3.58 MG/DL (ref 0.6–1.3)
CREAT UR-MCNC: 67.5 MG/DL
DEPRECATED RDW RBC AUTO: 47 FL (ref 37–54)
EOSINOPHIL # BLD AUTO: 0.61 10*3/MM3 (ref 0–0.3)
EOSINOPHIL NFR BLD AUTO: 5 % (ref 0–3)
ERYTHROCYTE [DISTWIDTH] IN BLOOD BY AUTOMATED COUNT: 13.5 % (ref 11.3–14.5)
FERRITIN SERPL-MCNC: 930 NG/ML (ref 10–291)
FOLATE SERPL-MCNC: 7.65 NG/ML (ref 3.2–20)
GFR SERPL CREATININE-BSD FRML MDRD: 15 ML/MIN/1.73
GLUCOSE BLD-MCNC: 162 MG/DL (ref 70–100)
GLUCOSE BLDC GLUCOMTR-MCNC: 215 MG/DL (ref 70–130)
GLUCOSE UR STRIP-MCNC: NEGATIVE MG/DL
HBA1C MFR BLD: 6.8 % (ref 4.8–5.6)
HCT VFR BLD AUTO: 21.9 % (ref 34.5–44)
HCT VFR BLD AUTO: 22.7 % (ref 34.5–44)
HCT VFR BLD AUTO: 23 % (ref 34.5–44)
HGB BLD-MCNC: 6.9 G/DL (ref 11.5–15.5)
HGB BLD-MCNC: 7.2 G/DL (ref 11.5–15.5)
HGB BLD-MCNC: 7.2 G/DL (ref 11.5–15.5)
HGB UR QL STRIP.AUTO: ABNORMAL
HYALINE CASTS UR QL AUTO: ABNORMAL /LPF
IMM GRANULOCYTES # BLD: 0.12 10*3/MM3 (ref 0–0.03)
IMM GRANULOCYTES NFR BLD: 1 % (ref 0–0.6)
IRON 24H UR-MRATE: 64 MCG/DL (ref 50–175)
IRON SATN MFR SERPL: 30 % (ref 15–50)
KETONES UR QL STRIP: NEGATIVE
LEUKOCYTE ESTERASE UR QL STRIP.AUTO: ABNORMAL
LYMPHOCYTES # BLD AUTO: 2.36 10*3/MM3 (ref 0.6–4.8)
LYMPHOCYTES NFR BLD AUTO: 19.4 % (ref 24–44)
MCH RBC QN AUTO: 30.6 PG (ref 27–31)
MCHC RBC AUTO-ENTMCNC: 31.7 G/DL (ref 32–36)
MCV RBC AUTO: 96.6 FL (ref 80–99)
MONOCYTES # BLD AUTO: 0.85 10*3/MM3 (ref 0–1)
MONOCYTES NFR BLD AUTO: 7 % (ref 0–12)
NEUTROPHILS # BLD AUTO: 8.34 10*3/MM3 (ref 1.5–8.3)
NEUTROPHILS NFR BLD AUTO: 68.4 % (ref 41–71)
NITRITE UR QL STRIP: NEGATIVE
PH UR STRIP.AUTO: 6 [PH] (ref 5–8)
PLATELET # BLD AUTO: 262 10*3/MM3 (ref 150–450)
PMV BLD AUTO: 10 FL (ref 6–12)
POTASSIUM BLD-SCNC: 3.5 MMOL/L (ref 3.5–5.5)
PROT UR QL STRIP: ABNORMAL
RBC # BLD AUTO: 2.35 10*6/MM3 (ref 3.89–5.14)
RBC # UR: ABNORMAL /HPF
REF LAB TEST METHOD: ABNORMAL
RETICS/RBC NFR AUTO: 3.48 % (ref 0.5–1.5)
SODIUM BLD-SCNC: 143 MMOL/L (ref 132–146)
SODIUM UR-SCNC: 37 MMOL/L (ref 30–90)
SP GR UR STRIP: 1.01 (ref 1–1.03)
SQUAMOUS #/AREA URNS HPF: ABNORMAL /HPF
TIBC SERPL-MCNC: 216 MCG/DL (ref 250–450)
UROBILINOGEN UR QL STRIP: ABNORMAL
VIT B12 BLD-MCNC: 724 PG/ML (ref 211–911)
WBC NRBC COR # BLD: 12.19 10*3/MM3 (ref 3.5–10.8)
WBC UR QL AUTO: ABNORMAL /HPF

## 2018-08-03 PROCEDURE — 83036 HEMOGLOBIN GLYCOSYLATED A1C: CPT | Performed by: NURSE PRACTITIONER

## 2018-08-03 PROCEDURE — 87186 SC STD MICRODIL/AGAR DIL: CPT | Performed by: NURSE PRACTITIONER

## 2018-08-03 PROCEDURE — 82570 ASSAY OF URINE CREATININE: CPT | Performed by: NURSE PRACTITIONER

## 2018-08-03 PROCEDURE — 83540 ASSAY OF IRON: CPT | Performed by: NURSE PRACTITIONER

## 2018-08-03 PROCEDURE — 87086 URINE CULTURE/COLONY COUNT: CPT | Performed by: NURSE PRACTITIONER

## 2018-08-03 PROCEDURE — 84300 ASSAY OF URINE SODIUM: CPT | Performed by: NURSE PRACTITIONER

## 2018-08-03 PROCEDURE — 85014 HEMATOCRIT: CPT | Performed by: NURSE PRACTITIONER

## 2018-08-03 PROCEDURE — 82728 ASSAY OF FERRITIN: CPT | Performed by: NURSE PRACTITIONER

## 2018-08-03 PROCEDURE — 97162 PT EVAL MOD COMPLEX 30 MIN: CPT

## 2018-08-03 PROCEDURE — 81001 URINALYSIS AUTO W/SCOPE: CPT | Performed by: NURSE PRACTITIONER

## 2018-08-03 PROCEDURE — 80048 BASIC METABOLIC PNL TOTAL CA: CPT | Performed by: NURSE PRACTITIONER

## 2018-08-03 PROCEDURE — 85018 HEMOGLOBIN: CPT | Performed by: NURSE PRACTITIONER

## 2018-08-03 PROCEDURE — 87077 CULTURE AEROBIC IDENTIFY: CPT | Performed by: NURSE PRACTITIONER

## 2018-08-03 PROCEDURE — 85045 AUTOMATED RETICULOCYTE COUNT: CPT | Performed by: NURSE PRACTITIONER

## 2018-08-03 PROCEDURE — 99223 1ST HOSP IP/OBS HIGH 75: CPT | Performed by: INTERNAL MEDICINE

## 2018-08-03 PROCEDURE — 63510000001 EPOETIN ALFA PER 1000 UNITS: Performed by: INTERNAL MEDICINE

## 2018-08-03 PROCEDURE — 82746 ASSAY OF FOLIC ACID SERUM: CPT | Performed by: NURSE PRACTITIONER

## 2018-08-03 PROCEDURE — 82962 GLUCOSE BLOOD TEST: CPT

## 2018-08-03 PROCEDURE — 99221 1ST HOSP IP/OBS SF/LOW 40: CPT | Performed by: PHYSICIAN ASSISTANT

## 2018-08-03 PROCEDURE — 83550 IRON BINDING TEST: CPT | Performed by: NURSE PRACTITIONER

## 2018-08-03 PROCEDURE — 82607 VITAMIN B-12: CPT | Performed by: NURSE PRACTITIONER

## 2018-08-03 PROCEDURE — 85025 COMPLETE CBC W/AUTO DIFF WBC: CPT | Performed by: NURSE PRACTITIONER

## 2018-08-03 RX ORDER — ATORVASTATIN CALCIUM 10 MG/1
10 TABLET, FILM COATED ORAL NIGHTLY
Status: DISCONTINUED | OUTPATIENT
Start: 2018-08-03 | End: 2018-08-06 | Stop reason: HOSPADM

## 2018-08-03 RX ORDER — RISPERIDONE 0.25 MG/1
0.12 TABLET ORAL NIGHTLY
Status: DISCONTINUED | OUTPATIENT
Start: 2018-08-03 | End: 2018-08-06 | Stop reason: HOSPADM

## 2018-08-03 RX ORDER — CETIRIZINE HYDROCHLORIDE 10 MG/1
10 TABLET ORAL DAILY
Status: DISCONTINUED | OUTPATIENT
Start: 2018-08-03 | End: 2018-08-06 | Stop reason: HOSPADM

## 2018-08-03 RX ORDER — HYDRALAZINE HYDROCHLORIDE 25 MG/1
25 TABLET, FILM COATED ORAL EVERY 8 HOURS
Status: DISCONTINUED | OUTPATIENT
Start: 2018-08-03 | End: 2018-08-06 | Stop reason: HOSPADM

## 2018-08-03 RX ORDER — PANTOPRAZOLE SODIUM 40 MG/1
40 TABLET, DELAYED RELEASE ORAL EVERY MORNING
Status: DISCONTINUED | OUTPATIENT
Start: 2018-08-03 | End: 2018-08-03

## 2018-08-03 RX ORDER — FAMOTIDINE 20 MG/1
20 TABLET, FILM COATED ORAL DAILY PRN
Status: DISCONTINUED | OUTPATIENT
Start: 2018-08-03 | End: 2018-08-06 | Stop reason: HOSPADM

## 2018-08-03 RX ORDER — BISACODYL 5 MG/1
5 TABLET, DELAYED RELEASE ORAL DAILY PRN
Status: DISCONTINUED | OUTPATIENT
Start: 2018-08-03 | End: 2018-08-06 | Stop reason: HOSPADM

## 2018-08-03 RX ORDER — PANTOPRAZOLE SODIUM 40 MG/1
40 TABLET, DELAYED RELEASE ORAL
Status: DISCONTINUED | OUTPATIENT
Start: 2018-08-03 | End: 2018-08-06 | Stop reason: HOSPADM

## 2018-08-03 RX ORDER — ISOSORBIDE MONONITRATE 30 MG/1
30 TABLET, EXTENDED RELEASE ORAL DAILY
Status: DISCONTINUED | OUTPATIENT
Start: 2018-08-03 | End: 2018-08-06 | Stop reason: HOSPADM

## 2018-08-03 RX ORDER — AMLODIPINE BESYLATE 5 MG/1
5 TABLET ORAL DAILY
Status: DISCONTINUED | OUTPATIENT
Start: 2018-08-03 | End: 2018-08-06 | Stop reason: HOSPADM

## 2018-08-03 RX ORDER — CLONAZEPAM 0.5 MG/1
0.25 TABLET ORAL DAILY PRN
Status: DISCONTINUED | OUTPATIENT
Start: 2018-08-03 | End: 2018-08-06 | Stop reason: HOSPADM

## 2018-08-03 RX ORDER — SELENIUM SULFIDE 2.5 MG/100ML
LOTION TOPICAL 2 TIMES WEEKLY
Status: DISCONTINUED | OUTPATIENT
Start: 2018-08-06 | End: 2018-08-06 | Stop reason: HOSPADM

## 2018-08-03 RX ORDER — CARVEDILOL 12.5 MG/1
25 TABLET ORAL 2 TIMES DAILY WITH MEALS
Status: DISCONTINUED | OUTPATIENT
Start: 2018-08-03 | End: 2018-08-06 | Stop reason: HOSPADM

## 2018-08-03 RX ORDER — PROMETHAZINE HYDROCHLORIDE 25 MG/1
25 TABLET ORAL EVERY 6 HOURS PRN
Status: DISCONTINUED | OUTPATIENT
Start: 2018-08-03 | End: 2018-08-06 | Stop reason: HOSPADM

## 2018-08-03 RX ORDER — CALCITRIOL 0.25 UG/1
0.25 CAPSULE, LIQUID FILLED ORAL DAILY
Status: DISCONTINUED | OUTPATIENT
Start: 2018-08-03 | End: 2018-08-04

## 2018-08-03 RX ORDER — ASCORBIC ACID 500 MG
250 TABLET ORAL DAILY
Status: DISCONTINUED | OUTPATIENT
Start: 2018-08-03 | End: 2018-08-06 | Stop reason: HOSPADM

## 2018-08-03 RX ORDER — FERROUS SULFATE 325(65) MG
325 TABLET ORAL DAILY
Status: DISCONTINUED | OUTPATIENT
Start: 2018-08-03 | End: 2018-08-06 | Stop reason: HOSPADM

## 2018-08-03 RX ORDER — SODIUM CHLORIDE 9 MG/ML
50 INJECTION, SOLUTION INTRAVENOUS CONTINUOUS
Status: ACTIVE | OUTPATIENT
Start: 2018-08-03 | End: 2018-08-03

## 2018-08-03 RX ORDER — SODIUM CHLORIDE 0.9 % (FLUSH) 0.9 %
1-10 SYRINGE (ML) INJECTION AS NEEDED
Status: DISCONTINUED | OUTPATIENT
Start: 2018-08-03 | End: 2018-08-06 | Stop reason: HOSPADM

## 2018-08-03 RX ORDER — ACETAMINOPHEN 325 MG/1
650 TABLET ORAL EVERY 4 HOURS PRN
Status: DISCONTINUED | OUTPATIENT
Start: 2018-08-03 | End: 2018-08-06 | Stop reason: HOSPADM

## 2018-08-03 RX ADMIN — SERTRALINE HYDROCHLORIDE 50 MG: 50 TABLET ORAL at 07:52

## 2018-08-03 RX ADMIN — ISOSORBIDE MONONITRATE 30 MG: 30 TABLET, EXTENDED RELEASE ORAL at 07:51

## 2018-08-03 RX ADMIN — PANTOPRAZOLE SODIUM 40 MG: 40 TABLET, DELAYED RELEASE ORAL at 05:12

## 2018-08-03 RX ADMIN — ATORVASTATIN CALCIUM 10 MG: 10 TABLET, FILM COATED ORAL at 02:22

## 2018-08-03 RX ADMIN — SODIUM CHLORIDE 50 ML/HR: 9 INJECTION, SOLUTION INTRAVENOUS at 03:17

## 2018-08-03 RX ADMIN — AMLODIPINE BESYLATE 5 MG: 5 TABLET ORAL at 07:52

## 2018-08-03 RX ADMIN — ATORVASTATIN CALCIUM 10 MG: 10 TABLET, FILM COATED ORAL at 21:24

## 2018-08-03 RX ADMIN — HYDRALAZINE HYDROCHLORIDE 25 MG: 25 TABLET ORAL at 21:24

## 2018-08-03 RX ADMIN — CETIRIZINE HYDROCHLORIDE 10 MG: 10 TABLET, FILM COATED ORAL at 07:52

## 2018-08-03 RX ADMIN — Medication 325 MG: at 07:51

## 2018-08-03 RX ADMIN — OXYCODONE HYDROCHLORIDE AND ACETAMINOPHEN 250 MG: 500 TABLET ORAL at 07:52

## 2018-08-03 RX ADMIN — RISPERIDONE 0.12 MG: 0.25 TABLET ORAL at 02:22

## 2018-08-03 RX ADMIN — HYDRALAZINE HYDROCHLORIDE 25 MG: 25 TABLET ORAL at 15:44

## 2018-08-03 RX ADMIN — RISPERIDONE 0.12 MG: 0.25 TABLET ORAL at 21:24

## 2018-08-03 RX ADMIN — CARVEDILOL 25 MG: 12.5 TABLET, FILM COATED ORAL at 07:52

## 2018-08-03 RX ADMIN — EPOETIN ALFA 20000 UNITS: 20000 SOLUTION INTRAVENOUS; SUBCUTANEOUS at 17:44

## 2018-08-03 RX ADMIN — CARVEDILOL 25 MG: 12.5 TABLET, FILM COATED ORAL at 21:24

## 2018-08-03 RX ADMIN — HYDRALAZINE HYDROCHLORIDE 25 MG: 25 TABLET ORAL at 05:12

## 2018-08-03 NOTE — THERAPY EVALUATION
Acute Care - Physical Therapy Initial Evaluation  [facility]     Patient Name: Joy Bueno  : 1950  MRN: [id number]  Today's Date: 8/3/2018   Onset of Illness/Injury or Date of Surgery: 18  Date of Referral to PT: 18  Referring Physician: SUELLEN Pate      Admit Date: 2018    Visit Dx:     ICD-10-CM ICD-9-CM   1. Anemia in stage 4 chronic kidney disease (CMS/[facility]) N18.4 285.21    D63.1 585.4   2. Acute renal failure superimposed on stage 4 chronic kidney disease, unspecified acute renal failure type (CMS/[facility]) N17.9 584.9    N18.4 585.4   3. Generalized weakness R53.1 780.79   4. Other fatigue R53.83 780.79     Patient Active Problem List   Diagnosis   • Asthma   • Hypertension   • Symptomatic anemia   • Morbid obesity (CMS/[facility])   • Renal failure   • Anxiety   • Anemia   • CKD (chronic kidney disease) Stage 3   • Gastritis   • Pleural effusion, right   • Possible pneumonia of RML/RLL   • Clostridium difficile diarrhea   • Urinary tract infection associated with indwelling urethral catheter (CMS/[facility])   • Bilateral leg pain   • Generalized weakness   • Abdominal pain   • Chronic heart failure (CMS/[facility])   • Anemia in stage 4 chronic kidney disease (CMS/[facility])   • NATALIO (acute kidney injury) (CMS/[facility])   • CKD (chronic kidney disease) stage 4, GFR 15-29 ml/min (CMS/[facility])   • Diabetes mellitus, type II (CMS/[facility])   • History of UTI   • History of Clostridium difficile infection   • Tinea capitis   • History of respiratory failure, since off home oxygen     Past Medical History:   Diagnosis Date   • Anemia    • Anxiety    • Asthma    • Chronic kidney disease    • Diabetes mellitus (CMS/[facility])    • Diabetes mellitus, type II (CMS/[facility]) 8/3/2018   • History of Clostridium difficile infection 8/3/2018   • History of respiratory failure, since off home oxygen 8/3/2018   • History of UTI 8/3/2018   • Hypertension    • Morbid obesity (CMS/[facility])    • Osteoarthritis    • Tinea capitis 8/3/2018     Past Surgical  History:   Procedure Laterality Date   •  SECTION     • COLONOSCOPY N/A 2017    Procedure: COLONOSCOPY;  Surgeon: Mark I Brunner, MD;  Location:  [facility];  Service:    • ENDOSCOPY N/A 2017    Procedure: ESOPHAGOGASTRODUODENOSCOPY ;  Surgeon: Velasquez Call MD;  Location:  [facility];  Service:    • HERNIA REPAIR          PT ASSESSMENT (last 12 hours)      Physical Therapy Evaluation     Row Name 18 1027          PT Evaluation Time/Intention    Subjective Information no complaints  -     Document Type evaluation  -     Mode of Treatment individual therapy;physical therapy  -     Comment Pt initially declined eval earlier; RN requested PT return 2/2 pt more agreeable to t/f, and t/f needed at this time to go to lift room.  -     Row Name 18 1027          General Information    Patient Profile Reviewed? yes  -     Onset of Illness/Injury or Date of Surgery 18  -     Referring Physician SUELLEN Pate  -     Patient Observations alert;poorly cooperative  -     Patient/Family Observations Pt sitting at EOB with RN with CGA and pillows behind back. RN techs present as well.  -     Prior Level of Function max assist:;transfer;gait   2 months agoat rehab; recently at LTC  -     Pertinent History of Current Functional Problem Pt [facility] ED [facility] with low H and H. Recently dependent with ADLs other than feeding. Dependent bed mobility during last acute care visit.   -     Existing Precautions/Restrictions other (see comments)   obese/  -EH     Risks Reviewed patient:;LOB;dizziness;increased discomfort;change in vital signs  -     Benefits Reviewed patient:;improve function;increase independence  -     Barriers to Rehab previous functional deficit;medically complex   motivation  -     Row Name 18 1027          Relationship/Environment    Lives With facility resident  -     Row Name 18 1027          Resource/Environmental  Concerns    Current Living Arrangements residential facility  -[facility] Name 08/03/18 1027          Cognitive Assessment/Intervention- PT/OT    Orientation Status (Cognition) oriented x 4  -     Follows Commands (Cognition) 50-74% accuracy  -     Safety Deficit (Cognitive) safety precautions awareness;safety precautions follow-through/compliance  -[facility] Name 08/03/18 1027          Safety Issues, Functional Mobility    Safety Issues Affecting Function (Mobility) ability to follow commands;at risk behavior observed;safety precaution awareness;safety precautions follow-through/compliance  -     Impairments Affecting Function (Mobility) endurance/activity tolerance;strength;range of motion (ROM)  -EH     Row Name 08/03/18 1027          Bed Mobility Assessment/Treatment    Comment (Bed Mobility) Pt on EOB when PT returned  -EH     Row Name 08/03/18 1027          Transfer Assessment/Treatment    Transfer Assessment/Treatment stand pivot/stand step transfer  -     Comment (Transfers) Assist of 3. Pt cued for setup with assist, cued for hand placement. Stands at EOB with MAX A x2 but requires more assist on follow through of turn and sits prior to being fully turned to chair and requires assists for shifting into chair.  -EH     Row Name 08/03/18 1027          Stand Pivot/Stand Step Transfer    Stand Pivot/Stand Step [place] maximum assist (25% patient effort);other (see comments)   MAX A x 3. 2 people in front of pt, 1 behind.  -EH     Row Name 08/03/18 1027          Gait/Stairs Assessment/Training    Comment (Gait/Stairs) not appropriate this date  -[facility] Name 08/03/18 1027          General ROM    GENERAL ROM COMMENTS BLE WFL  -[facility] Name 08/03/18 1027          General Assessment (Manual Muscle Testing)    Comment, General Manual Muscle Testing (MMT) Assessment BLE functionally 3-/5  -EH     Row Name 08/03/18 1027          Motor Assessment/Intervention    Additional Documentation Therapeutic  Exercise (Group);Balance (Group)  -[facility] Name 08/03/18 1027          Balance    Balance static sitting balance;static standing balance  -EH     Row Name 08/03/18 1027          Static Sitting Balance    Level of [place] (Unsupported Sitting, Static Balance) minimal assist, 75% patient effort  -     Comment (Supported Sitting, Static Balance) with pillows pt's back behind for support  -EH     Row Name 08/03/18 1027          Static Standing Balance    Level of [place] (Supported Standing, Static Balance) maximal assist, 25 to 49% patient effort  -     Time Able to Maintain Position (Supported Standing, Static Balance) less than 15 seconds  -[facility] Name 08/03/18 1027          Coping    Observed Emotional State apprehensive  -EH     Row Name 08/03/18 1027          Physical Therapy Clinical Impression    Date of Referral to PT 08/02/18  -     PT Diagnosis (PT Clinical Impression) decreased strength, functional mobility, endurance  -     Criteria for Skilled Interventions Met (PT Clinical Impression) yes;treatment indicated  -     Rehab Potential (PT Clinical Summary) fair, will monitor progress closely  -EH     Row Name 08/03/18 1027          Vital Signs    Intra Systolic BP Rehab --   stable and WNL at EOB when taken by RN.  -     Pre Patient Position Sitting  -     Intra Patient Position Sitting  -     Post Patient Position Sitting  -EH     Row Name 08/03/18 1027          Physical Therapy Goals    Bed Mobility Goal Selection (PT) bed mobility, PT goal 1  -     Transfer Goal Selection (PT) transfer, PT goal 1  -EH     Row Name 08/03/18 1027          Bed Mobility Goal 1 (PT)    Activity/Assistive Device (Bed Mobility Goal 1, PT) sit to supine/supine to sit  -     [place] Level/Cues Needed (Bed Mobility Goal 1, PT) moderate assist (50-74% patient effort)  -     Time Frame (Bed Mobility Goal 1, PT) long term goal (LTG);10 days  -EH     Row Name 08/03/18 1027          Transfer  Goal 1 (PT)    Activity/Assistive Device (Transfer Goal 1, PT) sit-to-stand/stand-to-sit;bed-to-chair/chair-to-bed;walker, rolling  -     [place] Level/Cues Needed (Transfer Goal 1, PT) moderate assist (50-74% patient effort)  -     Time Frame (Transfer Goal 1, PT) long term goal (LTG);10 days  -       User Key  (r) = Recorded By, (t) = Taken By, (c) = Cosigned By    Initials Name Provider Type     Justa Guerra PT Physical Therapist          Physical Therapy Education     Title: PT OT SLP Therapies (Active)     Topic: Physical Therapy (Active)     Point: Mobility training (Done)    Learning Progress Summary     Learner Status Readiness Method Response Comment Documented by    Patient Done Acceptance E ,[facility] 08/03/18 1122          Point: Body mechanics (Done)    Learning Progress Summary     Learner Status Readiness Method Response Comment Documented by    Patient Done Acceptance E ,[facility] 08/03/18 1122          Point: Precautions (Done)    Learning Progress Summary     Learner Status Readiness Method Response Comment Documented by    Patient Done Acceptance E ,[facility] 08/03/18 1122                      User Key     Initials Effective Dates Name Provider Type Discipline     06/19/15 -  Justa Guerra, PT Physical Therapist PT                PT Recommendation and Plan  Anticipated Discharge Disposition (PT): skilled nursing facility  Therapy Frequency (PT Clinical Impression): daily  Outcome Summary/Treatment Plan (PT)  Anticipated Discharge Disposition (PT): skilled nursing facility  Plan of Care Reviewed With: patient  Outcome Summary: Pt reports baseline of independent bed mobility and recent progression to taking babysteps in rehab in April with RWx. Pt has been at [facility] recently with dependence for all ADLs per chart. Pt is assisted over to chair with assist of three this date and sat prior to completing turn. Pt will require rehab for improvement of functional mobility. May be  limited by pt motivation.          Outcome Measures     Row Name 08/03/18 1027             How much help from another person do you currently need...    Turning from your back to your side while in flat bed without using bedrails? 2  -EH      Moving from lying on back to sitting on the side of a flat bed without bedrails? 2  -EH      Moving to and from a bed to a chair (including a wheelchair)? 1  -EH      Standing up from a chair using your arms (e.g., wheelchair, bedside chair)? 2  -EH      Climbing 3-5 steps with a railing? 1  -EH      To walk in hospital room? 1  -EH      AM-PAC 6 Clicks Score 9  -         Functional Assessment    Outcome Measure Options AM-PAC 6 Clicks Basic Mobility (PT)  -        User Key  (r) = Recorded By, (t) = Taken By, (c) = Cosigned By    Initials Name Provider Type     Justa Guerra PT Physical Therapist           Time Calculation:         PT Charges     Row Name 08/03/18 1307             Time Calculation    Start Time 1027  -      PT Received On 08/03/18  -      PT Goal Re-Cert Due Date 08/13/18  -        User Key  (r) = Recorded By, (t) = Taken By, (c) = Cosigned By    Initials Name Provider Type     Justa Guerra PT Physical Therapist        Therapy Suggested Charges     Code   Minutes Charges    None           Therapy Charges for Today     Code Description Service Date Service Provider Modifiers Qty    [id number]  PT EVAL MOD COMPLEXITY 4 8/3/2018 Justa Guerra PT GP 1          PT G-Codes  Outcome Measure Options: AM-PAC 6 Clicks Basic Mobility (PT)      Justa Guerra PT  8/3/2018

## 2018-08-03 NOTE — CONSULTS
NAL Consult Note    Joy Bueno  1950  [id number]    Date of Admit:  2018    Date of Consult: 8/3/2018        Requesting Provider: No ref. provider found  Evaluating Physician: Rocky Nova MD        Reason for Consultation:  NATALIO ON CKD.  History of present illness:    Patient is a 68 y.o.  Yr old female KNOWN TO [facility] WITH STAGE 4 CKD ( BX-PROVEN DM NEPHROPATHY AND LAST SEEN IN OUR OFFICE ON 2017. ) AND ANEMIA SENT HERE FROM THE NH BECAUSE OF ABNORMAL LABS AND WE ARE ASKED TO SEE BECAUSE OF WORSENING RENAL FXN. CREAT: 3.58 8/3//18, 3.82 18, 3.7 3/2018, 2.4 2017, 2.0 2017. -114 PAST 24 HRS. HGB 8.3 YESTERDAY AND 7.2 TODAY. FERRITIN 930. FE SAT 30%.    Past Medical History:   Diagnosis Date   • Anemia    • Anxiety    • Asthma    • Chronic kidney disease    • Diabetes mellitus (CMS/[facility])    • Diabetes mellitus, type II (CMS/[facility]) 8/3/2018   • History of Clostridium difficile infection 8/3/2018   • History of respiratory failure, since off home oxygen 8/3/2018   • History of UTI 8/3/2018   • Hypertension    • Morbid obesity (CMS/[facility])    • Osteoarthritis    • Tinea capitis 8/3/2018       Past Surgical History:   Procedure Laterality Date   •  SECTION     • COLONOSCOPY N/A 2017    Procedure: COLONOSCOPY;  Surgeon: Mark I Brunner, MD;  Location:  [facility];  Service:    • ENDOSCOPY N/A 2017    Procedure: ESOPHAGOGASTRODUODENOSCOPY ;  Surgeon: Velasquez Call MD;  Location:  [facility];  Service:    • HERNIA REPAIR         Social History     Social History   • Marital status:      Social History Main Topics   • Smoking status: Former Smoker     Packs/day: 0.25      Comment: unknown when quit, maybe last year   • Alcohol use No   • Drug use: No   • Sexual activity: No     Other Topics Concern   • Not on file     Social History Narrative    Mrs. Bueno is a 68 year old AA  female. She lives alone in [place] but has been in rehab for some  time. She has a daughter. She does not have an advanced directive.       family history includes Cardiomyopathy in her mother; Diabetes in her father; Stroke in her father. NO FH OF RENAL DZ.    Allergies   Allergen Reactions   • Geodon [Ziprasidone Hcl] Unknown (See Comments)     PT DOESN'T REMEMBER   • Haldol [Haloperidol Lactate] Unknown (See Comments)     PT DOESN'T REMEMBER   • Seroquel [Quetiapine Fumarate] Unknown (See Comments)     PT DOESN'T REMEMBER       Medication:    Current Facility-Administered Medications:   •  acetaminophen (TYLENOL) tablet 650 mg, 650 mg, Oral, Q4H PRN, Tash Pate, APRN  •  amLODIPine (NORVASC) tablet 5 mg, 5 mg, Oral, Daily, Tash Pate, APRN, 5 mg at 08/03/18 0752  •  atorvastatin (LIPITOR) tablet 10 mg, 10 mg, Oral, Nightly, Tash Pate, APRN, 10 mg at 08/03/18 0222  •  bisacodyl (DULCOLAX) EC tablet 5 mg, 5 mg, Oral, Daily PRN, Tash Pate, APRN  •  calcitriol (ROCALTROL) capsule 0.25 mcg, 0.25 mcg, Oral, Daily, Tash Pate, APRN  •  carvedilol (COREG) tablet 25 mg, 25 mg, Oral, BID With Meals, Tash Pate, APRN, 25 mg at 08/03/18 0752  •  cetirizine (zyrTEC) tablet 10 mg, 10 mg, Oral, Daily, Tash Pate, APRN, 10 mg at 08/03/18 0752  •  clonazePAM (KlonoPIN) tablet 0.25 mg, 0.25 mg, Oral, Daily PRN, Tash Pate, APRN  •  epoetin porter (EPOGEN,PROCRIT) injection 20,000 Units, 20,000 Units, Subcutaneous, Once per day on Mon Wed Fri, Rocky Nova MD  •  famotidine (PEPCID) tablet 20 mg, 20 mg, Oral, Daily PRN, Tash Pate W, APRN  •  ferrous sulfate tablet 325 mg, 325 mg, Oral, Daily, Tash Pate, APRN, 325 mg at 08/03/18 0751  •  hydrALAZINE (APRESOLINE) tablet 25 mg, 25 mg, Oral, Q8H, Tash Pate APRN, 25 mg at 08/03/18 0512  •  isosorbide mononitrate (IMDUR) 24 hr tablet 30 mg, 30 mg, Oral, Daily, Tash Pate APRN, 30 mg at 08/03/18 0751  •  pantoprazole  (PROTONIX) EC tablet 40 mg, 40 mg, Oral, Q AM, Andrez Orantes, RPH, 40 mg at 08/03/18 0512  •  promethazine (PHENERGAN) tablet 25 mg, 25 mg, Oral, Q6H PRN, Tash Pate APRN  •  risperiDONE (risperDAL) tablet 0.125 mg, 0.125 mg, Oral, Nightly, Tash Pate APRN, 0.125 mg at 08/03/18 0222  •  [START ON 8/6/2018] selenium sulfide (SELSUN) 2.5 % shampoo, , Topical, Once per day on Mon Thu, Tash Pate APRN  •  sertraline (ZOLOFT) tablet 50 mg, 50 mg, Oral, Daily, Tash Pate APRN, 50 mg at 08/03/18 0752  •  sodium chloride 0.9 % flush 1-10 mL, 1-10 mL, Intravenous, PRN, Tash Pate APRN  •  Insert peripheral IV, , , Once **AND** sodium chloride 0.9 % flush 10 mL, 10 mL, Intravenous, PRN, Jimi Ricks, PA, 10 mL at 08/02/18 1859  •  vitamin C (ASCORBIC ACID) tablet 250 mg, 250 mg, Oral, Daily, Tash Pate APRN, 250 mg at 08/03/18 0752    Prescriptions Prior to Admission   Medication Sig Dispense Refill Last Dose   • acetaminophen (TYLENOL) 325 MG tablet Take 650 mg by mouth Every 4 (Four) Hours As Needed for Mild Pain (1-3).      • albuterol (PROVENTIL HFA;VENTOLIN HFA) 108 (90 BASE) MCG/ACT inhaler Inhale 2 puffs Every 4 (Four) Hours As Needed for Wheezing.      • amLODIPine (NORVASC) 10 MG tablet Take 1 tablet by mouth Daily. (Patient taking differently: Take 5 mg by mouth Daily.) 30 tablet 0    • atorvastatin (LIPITOR) 10 MG tablet Take 10 mg by mouth Every Night.      • bumetanide (BUMEX) 2 MG tablet Take 2 mg by mouth 2 (Two) Times a Day.      • calcitriol (ROCALTROL) 0.25 MCG capsule Take 0.25 mcg by mouth Daily.      • carvedilol (COREG) 25 MG tablet Take 25 mg by mouth 2 (Two) Times a Day With Meals.      • clonazePAM (KlonoPIN) 0.5 MG tablet Take 0.5 tablets by mouth 2 (Two) Times a Day As Needed for Anxiety. (Patient taking differently: Take 0.25 mg by mouth Daily.) 6 tablet 0    • ferrous sulfate 325 (65 FE) MG tablet Take 325 mg by mouth  Daily.      • glipiZIDE (GLUCOTROL XL) 5 MG ER tablet Take 5 mg by mouth Daily.      • hydrALAZINE (APRESOLINE) 25 MG tablet Take 25 mg by mouth 3 (Three) Times a Day.      • isosorbide mononitrate (IMDUR) 30 MG 24 hr tablet Take 30 mg by mouth Daily.      • loratadine (CLARITIN) 10 MG tablet Take 10 mg by mouth Daily.      • melatonin 1 MG tablet Take 3 mg by mouth Every Night.      • metOLazone (ZAROXOLYN) 5 MG tablet Take 5 mg by mouth Daily.      • NITROGLYCERIN SL Place 0.4 mg under the tongue. PRN CHEST PAIN      • pantoprazole (PROTONIX) 40 MG EC tablet Take 40 mg by mouth Daily.      • phenylephrine-shark liver oil-mineral oil-petrolatum (PREPARATION H) 0.25-3-14-71.9 % rectal ointment Insert  into the rectum 3 (Three) Times a Day As Needed for Hemorrhoids.      • polyethylene glycol (MIRALAX) packet Take 17 g by mouth Daily.      • potassium chloride (K-DUR,KLOR-CON) 20 MEQ CR tablet Take 20 mEq by mouth Daily.      • promethazine (PHENERGAN) 25 MG tablet Take 25 mg by mouth Every 6 (Six) Hours As Needed for Nausea or Vomiting.      • risperiDONE (risperDAL) 0.25 MG tablet Take 0.125 mg by mouth every night at bedtime.      • sertraline (ZOLOFT) 50 MG tablet Take 50 mg by mouth Daily.      • vitamin C (ASCORBIC ACID) 250 MG tablet Take 250 mg by mouth Daily.      • castor oil-balsam peru (VENELEX) ointment Apply 1 application topically Every 12 (Twelve) Hours.      • Cholecalciferol (VITAMIN D3) 05817 units tablet Take 50,000 Units by mouth Every 7 (Seven) Days.      • escitalopram (LEXAPRO) 10 MG tablet Take 1 tablet by mouth Daily.      • furosemide (LASIX) 40 MG tablet Take 40 mg by mouth 2 (Two) Times a Day.      • hydrALAZINE (APRESOLINE) 100 MG tablet Take 1 tablet by mouth 3 (Three) Times a Day. 90 tablet 0    • HYDROcodone-acetaminophen (NORCO) 5-325 MG per tablet Take 1 tablet by mouth Every 8 (Eight) Hours As Needed for Moderate Pain . 6 tablet 0    • isosorbide mononitrate (IMDUR) 60 MG 24 hr  "tablet Take 1 tablet by mouth Daily.      • miconazole (MICOTIN) 2 % powder Apply  topically Every 12 (Twelve) Hours. 30 g 0    • mometasone-formoterol (DULERA 200) 200-5 MCG/ACT inhaler Inhale 2 puffs 2 (Two) Times a Day.      • Multiple Vitamins-Minerals (MULTIVITAMIN ADULTS PO) Take 1 tablet by mouth Daily.      • saccharomyces boulardii (FLORASTOR) 250 MG capsule Take 1 capsule by mouth 2 (Two) Times a Day.      • simethicone (MYLICON) 80 MG chewable tablet Chew 80 mg Every 6 (Six) Hours As Needed for Flatulence.          Review of Systems:    Constitutional-- No Fever, chills or sweats. No significant change in weight. + FATIGUE/WEAKNESS.   Eye-- no diplopia, no conjunctivitis  ENT-- No new hearing or throat complaints.  No epistaxis or oral sores. No odynophagia or dysphagia. No headache, photophobia or neck stiffness.  CV-- No chest pain, palpitations, soa. + CHRONIC EDEMA ON DIURETICS.   Resp-- No SOB/cough/Hemoptysis  GI- No nausea, vomiting, or diarrhea.  No hematochezia, melena, or hematemesis.  -- No dysuria, hematuria, or flank pain. No lower tract obstructive symptoms  Skin-- No rash, warm and dry  Lymph- no painful or swollen lymph nodes in neck/axilla or groin.   Heme- No active bruising or bleeding; no Hx of DVT or PE.  MS-- no swelling or pain in the joints  Neuro-- No acute focal weakness or numbness in the arms or legs.  No seizures. + CHRONIC DEBILITATION.  Psych--No anxiety or depression  Endo--No cold or heat intolerance.  No polyuria, polydipsia, or polyphagia    Full review of systems reviewed and negative otherwise for acute complaints    Physical Exam:   Vital Signs   Blood pressure 143/61, pulse 76, temperature 98.8 °F (37.1 °C), resp. rate 20, height 162.6 cm (64\"), weight 131 kg (288 lb 9.6 oz), SpO2 98 %.     GENERAL: Awake and alert, in no acute distress.   HEENT: Normocephalic, atraumatic.  PER.  No conjunctivitis. No icterus. Oropharynx clear without evidence of thrush or exudate. " No evidence of peridontal disease.    NECK: Supple without nuchal rigidity. No mass.  LYMPH: No painful cervical, axillary or inguinal lymphadenopathy.  HEART: RRR; No murmur, rubs, gallops. No bruits in neck, abdomen, or groins, no edema  LUNGS: Normal respiratory effort. Nonlabored. No dullness.  No crepitus.  Clear to auscultation bilaterally without wheezing, rales, rhonchi.  ABDOMEN: Soft, nontender, nondistended. Positive bowel sounds. No rebound or guarding. NO mass or HSM. OBESE.  JOINTS:  Full range of motion, no redness or tenderness.  EXT:  No cyanosis, clubbing or edema.  :  BLADDER NOT DISTENDED.  SKIN: Warm and dry without rash  NEURO: Oriented to PPT. No focal neurological deficits. Strength equal bilateral  PSYCHIATRIC: Normal insight and judgement. Cooperative with PE    Laboratory Data    Results from last 7 days  Lab Units 08/03/18  0417 08/02/18  2147 08/02/18  1858   HEMOGLOBIN g/dL 7.2* 7.6* 8.3*   HEMATOCRIT % 22.7* 23.8* 25.5*       Results from last 7 days  Lab Units 08/03/18  0417 08/02/18  1858   SODIUM mmol/L 143 143   POTASSIUM mmol/L 3.5 3.6   CHLORIDE mmol/L 107 106   CO2 mmol/L 24.0 26.0   BUN mg/dL 114* 116*   CREATININE mg/dL 3.58* 3.82*   CALCIUM mg/dL 8.2* 9.2   ALBUMIN g/dL  --  3.83       Results from last 7 days  Lab Units 08/03/18  0417   GLUCOSE mg/dL 162*           Results from last 7 days  Lab Units 08/02/18  1858   ALK PHOS U/L 110*   BILIRUBIN mg/dL 0.3   ALT (SGPT) U/L 11   AST (SGOT) U/L 9     Estimated Creatinine Clearance: 20.5 mL/min (A) (by C-G formula based on SCr of 3.58 mg/dL (H)).    Radiology:      Impression: NATALIO ON STAGE 4 CKD ( FROM DM NEPHROPATHY ). NATALIO LIKELY FROM DIURESIS. HIGH BUN ? FROM GI BLOOD LOSS. ANEMIA WITH ADEQUATE FE STORES.      PLAN: Thank you for asking us to see Joy Bueno, I recommend the following: START EPO BUT PROBABLY CANNOT GET IN THE NH. WATCH GFR. MAY NEED TO START RRT SOON.       Rocky Nova MD  8/3/2018  1:22  PM

## 2018-08-03 NOTE — CONSULTS
[facility] Consult    Referring Provider: Charisse Roy DO   PCP: Provider, No Known    Reason for Consultation: Anemia    Chief complaint:  Weakness, fatigue     History of present illness:    Joy Bueno is a 68 y.o. female who is admitted with symptomatic anemia.  She has a history of CKD stage IV and chronic anemia.   She reports increased fatigue and generalized weakness over the last week.  Labs revealed worsening renal function (Cr 3.58, ) and worsening anemia (H&H 7.2 and 22.7).  She denies melena, hematochezia or hematemesis.  She does voice intermittent nausea and ~ weekly vomiting for six months.  She had an EGD/Colonoscopy in August 2017 for anemia that showed gastritis, scattered diverticula and internal hemorrhoids.  Ascending colon was not reached due to body habitus and torturous colon.  Barium enema unremarkable.      Allergies:  Geodon [ziprasidone hcl]; Haldol [haloperidol lactate]; and Seroquel [quetiapine fumarate]    Scheduled Meds:    amLODIPine 5 mg Oral Daily   atorvastatin 10 mg Oral Nightly   calcitriol 0.25 mcg Oral Daily   carvedilol 25 mg Oral BID With Meals   cetirizine 10 mg Oral Daily   ferrous sulfate 325 mg Oral Daily   hydrALAZINE 25 mg Oral Q8H   isosorbide mononitrate 30 mg Oral Daily   pantoprazole 40 mg Oral Q AM   risperiDONE 0.125 mg Oral Nightly   [START ON 8/6/2018] selenium sulfide  Topical Once per day on Mon Thu   sertraline 50 mg Oral Daily   vitamin C 250 mg Oral Daily        Infusions:    sodium chloride 50 mL/hr Last Rate: 50 mL/hr (08/03/18 0749)       PRN Meds:  •  acetaminophen  •  bisacodyl  •  clonazePAM  •  famotidine  •  promethazine  •  sodium chloride  •  Insert peripheral IV **AND** sodium chloride    Home Meds:  Prescriptions Prior to Admission   Medication Sig Dispense Refill Last Dose   • acetaminophen (TYLENOL) 325 MG tablet Take 650 mg by mouth Every 4 (Four) Hours As Needed for Mild Pain (1-3).      • albuterol (PROVENTIL  HFA;VENTOLIN HFA) 108 (90 BASE) MCG/ACT inhaler Inhale 2 puffs Every 4 (Four) Hours As Needed for Wheezing.      • amLODIPine (NORVASC) 10 MG tablet Take 1 tablet by mouth Daily. (Patient taking differently: Take 5 mg by mouth Daily.) 30 tablet 0    • atorvastatin (LIPITOR) 10 MG tablet Take 10 mg by mouth Every Night.      • bumetanide (BUMEX) 2 MG tablet Take 2 mg by mouth 2 (Two) Times a Day.      • calcitriol (ROCALTROL) 0.25 MCG capsule Take 0.25 mcg by mouth Daily.      • carvedilol (COREG) 25 MG tablet Take 25 mg by mouth 2 (Two) Times a Day With Meals.      • clonazePAM (KlonoPIN) 0.5 MG tablet Take 0.5 tablets by mouth 2 (Two) Times a Day As Needed for Anxiety. (Patient taking differently: Take 0.25 mg by mouth Daily.) 6 tablet 0    • ferrous sulfate 325 (65 FE) MG tablet Take 325 mg by mouth Daily.      • glipiZIDE (GLUCOTROL XL) 5 MG ER tablet Take 5 mg by mouth Daily.      • hydrALAZINE (APRESOLINE) 25 MG tablet Take 25 mg by mouth 3 (Three) Times a Day.      • isosorbide mononitrate (IMDUR) 30 MG 24 hr tablet Take 30 mg by mouth Daily.      • loratadine (CLARITIN) 10 MG tablet Take 10 mg by mouth Daily.      • melatonin 1 MG tablet Take 3 mg by mouth Every Night.      • metOLazone (ZAROXOLYN) 5 MG tablet Take 5 mg by mouth Daily.      • NITROGLYCERIN SL Place 0.4 mg under the tongue. PRN CHEST PAIN      • pantoprazole (PROTONIX) 40 MG EC tablet Take 40 mg by mouth Daily.      • phenylephrine-shark liver oil-mineral oil-petrolatum (PREPARATION H) 0.25-3-14-71.9 % rectal ointment Insert  into the rectum 3 (Three) Times a Day As Needed for Hemorrhoids.      • polyethylene glycol (MIRALAX) packet Take 17 g by mouth Daily.      • potassium chloride (K-DUR,KLOR-CON) 20 MEQ CR tablet Take 20 mEq by mouth Daily.      • promethazine (PHENERGAN) 25 MG tablet Take 25 mg by mouth Every 6 (Six) Hours As Needed for Nausea or Vomiting.      • risperiDONE (risperDAL) 0.25 MG tablet Take 0.125 mg by mouth every night at  bedtime.      • sertraline (ZOLOFT) 50 MG tablet Take 50 mg by mouth Daily.      • vitamin C (ASCORBIC ACID) 250 MG tablet Take 250 mg by mouth Daily.      • castor oil-balsam peru (VENELEX) ointment Apply 1 application topically Every 12 (Twelve) Hours.      • Cholecalciferol (VITAMIN D3) 13945 units tablet Take 50,000 Units by mouth Every 7 (Seven) Days.      • escitalopram (LEXAPRO) 10 MG tablet Take 1 tablet by mouth Daily.      • furosemide (LASIX) 40 MG tablet Take 40 mg by mouth 2 (Two) Times a Day.      • hydrALAZINE (APRESOLINE) 100 MG tablet Take 1 tablet by mouth 3 (Three) Times a Day. 90 tablet 0    • HYDROcodone-acetaminophen (NORCO) 5-325 MG per tablet Take 1 tablet by mouth Every 8 (Eight) Hours As Needed for Moderate Pain . 6 tablet 0    • isosorbide mononitrate (IMDUR) 60 MG 24 hr tablet Take 1 tablet by mouth Daily.      • miconazole (MICOTIN) 2 % powder Apply  topically Every 12 (Twelve) Hours. 30 g 0    • mometasone-formoterol (DULERA 200) 200-5 MCG/ACT inhaler Inhale 2 puffs 2 (Two) Times a Day.      • Multiple Vitamins-Minerals (MULTIVITAMIN ADULTS PO) Take 1 tablet by mouth Daily.      • saccharomyces boulardii (FLORASTOR) 250 MG capsule Take 1 capsule by mouth 2 (Two) Times a Day.      • simethicone (MYLICON) 80 MG chewable tablet Chew 80 mg Every 6 (Six) Hours As Needed for Flatulence.          ROS: Review of Systems   Constitutional: Positive for activity change and fatigue. Negative for chills and fever.   HENT: Negative for trouble swallowing and voice change.    Eyes: Negative.    Respiratory: Positive for shortness of breath.    Cardiovascular: Negative.    Gastrointestinal: Positive for nausea and vomiting. Negative for abdominal pain, constipation and diarrhea.   Endocrine: Negative.    Genitourinary: Negative.    Musculoskeletal: Negative.    Skin: Negative.    Neurological: Positive for dizziness and weakness.   Hematological: Negative.    Psychiatric/Behavioral: Negative.   "      PAST MED HX:  Past Medical History:   Diagnosis Date   • Anemia    • Anxiety    • Asthma    • Chronic kidney disease    • Diabetes mellitus (CMS/HCC)    • Diabetes mellitus, type II (CMS/HCC) 8/3/2018   • History of Clostridium difficile infection 8/3/2018   • History of respiratory failure, since off home oxygen 8/3/2018   • History of UTI 8/3/2018   • Hypertension    • Morbid obesity (CMS/[facility])    • Osteoarthritis    • Tinea capitis 8/3/2018       PAST SURG HX:  Past Surgical History:   Procedure Laterality Date   •  SECTION     • COLONOSCOPY N/A 2017    Procedure: COLONOSCOPY;  Surgeon: Mark I Brunner, MD;  Location:  [facility];  Service:    • ENDOSCOPY N/A 2017    Procedure: ESOPHAGOGASTRODUODENOSCOPY ;  Surgeon: Velasquez Call MD;  Location:  [facility];  Service:    • HERNIA REPAIR         FAM HX:  Family History   Problem Relation Age of Onset   • Cardiomyopathy Mother    • Stroke Father    • Diabetes Father        SOC HX:  Social History     Social History   • Marital status:      Spouse name: N/A   • Number of children: N/A   • Years of education: N/A     Occupational History   • Not on file.     Social History Main Topics   • Smoking status: Former Smoker     Packs/day: 0.25   • Smokeless tobacco: Not on file      Comment: unknown when quit, maybe last year   • Alcohol use No   • Drug use: No   • Sexual activity: No     Other Topics Concern   • Not on file     Social History Narrative    Mrs. Bueno is a 68 year old AA  female. She lives alone in [place] but has been in rehab for some time. She has a daughter. She does not have an advanced directive.       PHYSICAL EXAM  /61   Pulse 76   Temp 98.8 °F (37.1 °C)   Resp 20   Ht 162.6 cm (64\")   Wt 131 kg (288 lb 9.6 oz)   SpO2 98%   BMI 49.54 kg/m²   Wt Readings from Last 3 Encounters:   18 131 kg (288 lb 9.6 oz)   17 (!) 152 kg (336 lb)   10/04/17 (!) 153 kg (336 lb 6 oz)   ,body " mass index is 49.54 kg/m².  Physical Exam   Constitutional: She is oriented to person, place, and time.   HENT:   Head: Normocephalic and atraumatic.   Eyes: No scleral icterus.   Neck: Normal range of motion.   Cardiovascular: Normal rate and regular rhythm.    Pulmonary/Chest: Effort normal. No respiratory distress.   Abdominal: Soft. Bowel sounds are normal.   Obese   Musculoskeletal: She exhibits edema.   Neurological: She is alert and oriented to person, place, and time.   Skin: Skin is warm and dry.   Psychiatric: She has a normal mood and affect. Her behavior is normal.   Nursing note and vitals reviewed.      Results Review:   I reviewed the patient's new clinical results.    Lab Results   Component Value Date    WBC 12.19 (H) 08/03/2018    HGB 7.2 (L) 08/03/2018    HGB 7.6 (L) 08/02/2018    HGB 8.3 (L) 08/02/2018    HCT 22.7 (L) 08/03/2018    MCV 96.6 08/03/2018     08/03/2018       Lab Results   Component Value Date    INR 0.97 08/02/2018    INR 1.06 09/06/2017    INR 0.95 08/23/2017       Lab Results   Component Value Date    GLUCOSE 162 (H) 08/03/2018     (H) 08/03/2018    CREATININE 3.58 (H) 08/03/2018    EGFRIFAFRI 15 (L) 08/03/2018    BCR 31.8 (H) 08/03/2018    CO2 24.0 08/03/2018    CALCIUM 8.2 (L) 08/03/2018    ALBUMIN 3.83 08/02/2018    ALKPHOS 110 (H) 08/02/2018    BILITOT 0.3 08/02/2018    ALT 11 08/02/2018    AST 9 08/02/2018     Iron 64.   Ferritin 930.  Iron saturation 30%.  TIBC 216.    Folate 7.65  Reticulocyte 3.48 %  Vitamin B12 724      ASSESSMENTS/PLANS    1. Symptomatic anemia, normocytic   2. CKD stage IV-V   3. Nausea and vomiting, chronic, possibly due to uremia    Patient denies melena or hematochezia.  Stools are heme negative.  Suspect anemia of chronic disease.    >>> Recommend Epogen  >>> Defer repeat EGD/Colon unless overt bleeding develops     I discussed the patients findings and my recommendations with patient    RAMSEY Mccormick  08/03/18  9:50 AM

## 2018-08-03 NOTE — SIGNIFICANT NOTE
Clarified an O2 sat in 70s documented, appears erroneous from in ED. RN reports no hypoxia, on room air. Will monitor.    Also note pt very concerned about her scalp issues and balding - appears tinea capitus. Add selsun shampoo - WILL DEFER PO r/t avoid some for renal, and the griseofulvin might tear up her stomach and risk>benefit. [place] should f/u on this chronic issue. Will tend more to her acute needs at this time.

## 2018-08-03 NOTE — PLAN OF CARE
Problem: Patient Care Overview  Goal: Plan of Care Review  Outcome: Ongoing (interventions implemented as appropriate)   08/03/18 1119   Coping/Psychosocial   Plan of Care Reviewed With patient   OTHER   Outcome Summary Pt reports baseline of independent bed mobility and recent progression to taking babysteps in rehab in April with RWx. Pt has been at LTC recently with dependence for all ADLs per chart. Pt is assisted over to chair with assist of three this date and sat prior to completing turn. Pt will require rehab for improvement of functional mobility. May be limited by pt motivation.

## 2018-08-03 NOTE — H&P
[facility] Medicine Services  HISTORY AND PHYSICAL    Patient Name: Joy Bueno  : 1950  MRN: [id number]  Primary Care Physician: Provider, No Known; no PCP, currently managed by [facility] provider  Nephrology: [facility]    Subjective   Subjective     Chief Complaint:  Abnormal lab    HPI:  Joy Bueno is a 68 y.o. female who presented to [facility] ED by EMS from her rehab facility at [place] for abnormal lab. Her 18 rehab h/h is found to be 7.3/22.2, which is down from their prior comparison 3/7/18 at 9.2/28.1. She was noted to have worsening creatinine but her lab is unchanged from those same dates (today Cr/BUN 3.9/115, and in March 3.) - she has chronic CKD IV and follows with [facility]. She was admitted here at [facility] 2017 and comparisons to then were a Cr 2.4, and hgb 9-9.7. Unknown exact onset. Constant. Moderate. She appears dehydrated. Ms. Bueno states she is on diuretic and fluid restriction for edema.    Joy has been alert and oriented which is her baseline. She states she has been at rehab since January. She was hospitalized at [facility] five times in a 3 month period in 2017, with heavy concerns and even APS consult r/t self neglect, and unable to care for herself living alone. She is debilitated, and dependent on ADLs except for feeding. Ms. Bueno thinks she did go home since she was here last, and may have been to [facility], then to a rehab in [facility], and then to [place] in January.    Her last EGD+colonoscopy was here at [facility] 17 and 17, found to have symptomatic anemia. She had refused endoscopy initially and was tx with IV iron and PRBCs, and then did agree on that admission (non-erosive gastritis, a few diverticula, could not reach ascending colon). It was discussed to f/u with nephrology, consider epoetin. She was also on eliquis at some point for unknown reasons and it was d/c.    She denies any known bleeding. She is being  admitted to [facility] for further evaluation and treatment.    Review of Systems   Constitutional: Positive for fatigue. Negative for activity change, appetite change, chills, diaphoresis and fever.   HENT: Negative for trouble swallowing.    Respiratory: Negative for apnea, cough, shortness of breath and wheezing.    Cardiovascular: Positive for leg swelling. Negative for chest pain and palpitations.        Chronic generalized edema/legs, improves with diuretics.   Gastrointestinal: Negative for abdominal pain, blood in stool, constipation, diarrhea, nausea and vomiting.   Genitourinary: Negative for dysuria and hematuria.        Denies dark or odorous urine.   Musculoskeletal: Negative for myalgias.   Skin: Negative for color change, pallor, rash and wound.        States bumps on her head and hair loss, chronic/gradual.   Allergic/Immunologic: Negative for immunocompromised state.   Neurological: Positive for weakness. Negative for dizziness and syncope.   Psychiatric/Behavioral: Negative for confusion.        Otherwise 10-system ROS reviewed and is negative except as mentioned in the HPI.    Personal History     Past Medical History:   Diagnosis Date   • Anemia    • Anxiety    • Asthma    • Chronic kidney disease    • Diabetes mellitus (CMS/[facility])    • Diabetes mellitus, type II (CMS/HCC) 8/3/2018   • History of Clostridium difficile infection 8/3/2018   • History of UTI 8/3/2018   • Hypertension    • Morbid obesity (CMS/HCC)    • Osteoarthritis        Past Surgical History:   Procedure Laterality Date   •  SECTION     • COLONOSCOPY N/A 2017    Procedure: COLONOSCOPY;  Surgeon: Mark I Brunner, MD;  Location:  [facility];  Service:    • ENDOSCOPY N/A 2017    Procedure: ESOPHAGOGASTRODUODENOSCOPY ;  Surgeon: Velasquez Call MD;  Location:  [facility];  Service:    • HERNIA REPAIR         Family History: family history includes Cardiomyopathy in her mother; Diabetes in her father; Stroke in her  father.     Social History:  reports that she has quit smoking. She smoked 0.25 packs per day. She does not have any smokeless tobacco history on file. She reports that she does not drink alcohol or use drugs.  Social History     Social History Narrative    Mrs. Bueno is a 68 year old AA  female. She lives alone in [place] but has been in rehab for some time. She has a daughter. She does not have an advanced directive.       Medications:    No current facility-administered medications on file prior to encounter.      Current Outpatient Prescriptions on File Prior to Encounter   Medication Sig Dispense Refill   • acetaminophen (TYLENOL) 325 MG tablet Take 650 mg by mouth Every 4 (Four) Hours As Needed for Mild Pain (1-3).     • albuterol (PROVENTIL HFA;VENTOLIN HFA) 108 (90 BASE) MCG/ACT inhaler Inhale 2 puffs Every 4 (Four) Hours As Needed for Wheezing.     • amLODIPine (NORVASC) 10 MG tablet Take 1 tablet by mouth Daily. (Patient taking differently: Take 5 mg by mouth Daily.) 30 tablet 0   • atorvastatin (LIPITOR) 10 MG tablet Take 10 mg by mouth Every Night.     • clonazePAM (KlonoPIN) 0.5 MG tablet Take 0.5 tablets by mouth 2 (Two) Times a Day As Needed for Anxiety. 6 tablet 0   • phenylephrine-shark liver oil-mineral oil-petrolatum (PREPARATION H) 0.25-3-14-71.9 % rectal ointment Insert  into the rectum 3 (Three) Times a Day As Needed for Hemorrhoids.     • castor oil-balsam peru (VENELEX) ointment Apply 1 application topically Every 12 (Twelve) Hours.     • Cholecalciferol (VITAMIN D3) 61958 units tablet Take 50,000 Units by mouth Every 7 (Seven) Days.     • escitalopram (LEXAPRO) 10 MG tablet Take 1 tablet by mouth Daily.     • furosemide (LASIX) 40 MG tablet Take 40 mg by mouth 2 (Two) Times a Day.     • hydrALAZINE (APRESOLINE) 100 MG tablet Take 1 tablet by mouth 3 (Three) Times a Day. 90 tablet 0   • HYDROcodone-acetaminophen (NORCO) 5-325 MG per tablet Take 1 tablet by mouth Every 8 (Eight)  Hours As Needed for Moderate Pain . 6 tablet 0   • isosorbide mononitrate (IMDUR) 60 MG 24 hr tablet Take 1 tablet by mouth Daily.     • miconazole (MICOTIN) 2 % powder Apply  topically Every 12 (Twelve) Hours. 30 g 0   • mometasone-formoterol (DULERA 200) 200-5 MCG/ACT inhaler Inhale 2 puffs 2 (Two) Times a Day.     • Multiple Vitamins-Minerals (MULTIVITAMIN ADULTS PO) Take 1 tablet by mouth Daily.     • saccharomyces boulardii (FLORASTOR) 250 MG capsule Take 1 capsule by mouth 2 (Two) Times a Day.     • simethicone (MYLICON) 80 MG chewable tablet Chew 80 mg Every 6 (Six) Hours As Needed for Flatulence.         Allergies   Allergen Reactions   • Geodon [Ziprasidone Hcl] Unknown (See Comments)     PT DOESN'T REMEMBER   • Haldol [Haloperidol Lactate] Unknown (See Comments)     PT DOESN'T REMEMBER   • Seroquel [Quetiapine Fumarate] Unknown (See Comments)     PT DOESN'T REMEMBER       Objective   Objective     Vital Signs:   Temp:  [98.3 °F (36.8 °C)] 98.3 °F (36.8 °C)  Heart Rate:  [64-79] 76  Resp:  [16-18] 18  BP: (127-180)/(65-93) 146/65        Physical Exam   Constitutional: No acute distress, awake, alert  Eyes: PERRLA, sclerae anicteric, no conjunctival injection  HENT: NCAT, mucous membranes moist  Neck: Supple, no thyromegaly, no lymphadenopathy, trachea midline  Respiratory: Clear to auscultation bilaterally, nonlabored respirations   Cardiovascular: RRR, no murmurs, rubs, or gallops, palpable pedal pulses bilaterally  Gastrointestinal: Positive bowel sounds, soft, nontender, nondistended  Musculoskeletal:  bilateral ankle edema, no clubbing or cyanosis to extremities  Psychiatric: Appropriate affect, cooperative  Neurologic: Oriented x 3, strength symmetric in all extremities, Cranial Nerves grossly intact to confrontation, speech clear  Skin: No rashes      Results Reviewed:  I have personally reviewed current lab, radiology, and data and agree.    Lab Results (last 24 hours)     Procedure Component Value  Units Date/Time    CBC & Differential [[id number]] Collected:  08/02/18 1858    Specimen:  Blood Updated:  08/02/18 1919    Narrative:       The following orders were created for panel order CBC & Differential.  Procedure                               Abnormality         Status                     ---------                               -----------         ------                     CBC Auto Differential[[id number]]        Abnormal            Final result                 Please view results for these tests on the individual orders.    Comprehensive Metabolic Panel [[id number]]  (Abnormal) Collected:  08/02/18 1858    Specimen:  Blood Updated:  08/02/18 1937     Glucose 164 (H) mg/dL       (H) mg/dL      Creatinine 3.82 (H) mg/dL      Sodium 143 mmol/L      Potassium 3.6 mmol/L      Chloride 106 mmol/L      CO2 26.0 mmol/L      Calcium 9.2 mg/dL      Total Protein 7.9 g/dL      Albumin 3.83 g/dL      ALT (SGPT) 11 U/L      AST (SGOT) 9 U/L      Alkaline Phosphatase 110 (H) U/L      Total Bilirubin 0.3 mg/dL      eGFR  African Amer 14 (L) mL/min/1.73      Globulin 4.1 gm/dL      A/G Ratio 0.9 (L) g/dL      BUN/Creatinine Ratio 30.4 (H)     Anion Gap 11.0 mmol/L     Narrative:       National Kidney [employer] Guidelines    Stage     Description        GFR  1         Normal or High     90+  2         Mild decrease      60-89  3         Moderate decrease  30-59  4         Severe decrease    15-29  5         Kidney failure     <15    Protime-INR [[id number]]  (Normal) Collected:  08/02/18 1858    Specimen:  Blood Updated:  08/02/18 1941     Protime 10.2 Seconds      INR 0.97    aPTT [[id number]]  (Normal) Collected:  08/02/18 1858    Specimen:  Blood Updated:  08/02/18 1941     PTT 27.5 seconds     Narrative:       PTT = The equivalent PTT values for the therapeutic range of heparin levels at 0.3 to 0.5 U/ml are 55 to 70 seconds.    CBC Auto Differential [[id number]]  (Abnormal) Collected:  08/02/18 1858    Specimen:   Blood Updated:  08/02/18 1919     WBC 11.59 (H) 10*3/mm3      RBC 2.62 (L) 10*6/mm3      Hemoglobin 8.3 (L) g/dL      Hematocrit 25.5 (L) %      MCV 97.3 fL      MCH 31.7 (H) pg      MCHC 32.5 g/dL      RDW 13.5 %      RDW-SD 47.5 fl      MPV 9.7 fL      Platelets 270 10*3/mm3      Neutrophil % 71.6 (H) %      Lymphocyte % 17.8 (L) %      Monocyte % 3.6 %      Eosinophil % 5.6 (H) %      Basophil % 0.2 %      Immature Grans % 1.2 (H) %      Neutrophils, Absolute 8.30 10*3/mm3      Lymphocytes, Absolute 2.06 10*3/mm3      Monocytes, Absolute 0.42 10*3/mm3      Eosinophils, Absolute 0.65 (H) 10*3/mm3      Basophils, Absolute 0.02 10*3/mm3      Immature Grans, Absolute 0.14 (H) 10*3/mm3     POCT Occult Blood, stool [[id number]]  (Normal) Collected:  08/02/18 2011    Specimen:  Stool from Per Rectum Updated:  08/02/18 2012     Fecal Occult Blood Negative     Lot Number [id number]     Expiration Date 1-21     DEVELOPER LOT NUMBER [id number]     DEVELOPER EXPIRATION DATE 2,021-7     Positive Control Positive     Negative Control Negative    Hemoglobin & Hematocrit, Blood [[id number]]  (Abnormal) Collected:  08/02/18 2147    Specimen:  Blood from Arm, Right Updated:  08/02/18 2156     Hemoglobin 7.6 (L) g/dL      Hematocrit 23.8 (L) %           Estimated Creatinine Clearance: 19.2 mL/min (A) (by C-G formula based on SCr of 3.82 mg/dL (H)).  Brief Urine Lab Results  (Last result in the past 365 days)      Color   Clarity   Blood   Leuk Est   Nitrite   Protein   CREAT   Urine HCG        11/04/17 0215 Yellow Cloudy(A) Small (1+)(A) Negative Negative >=300 mg/dL (3+)(A)             No results found for: BNP  Imaging Results (last 24 hours)     ** No results found for the last 24 hours. **        Results for orders placed during the hospital encounter of 09/02/17   Adult Transthoracic Echo Complete    Narrative · Left ventricular wall thickness is consistent with mild concentric   hypertrophy.  · Left atrial cavity size is mildly  "dilated.  · Mild mitral valve regurgitation is present  · calcification of the aortic valve  · Mild tricuspid valve regurgitation is present.          Assessment/Plan   Assessment / Plan     Hospital Problem List     * (Principal)Anemia    Overview Addendum 8/28/2017 11:03 AM by Piyush Stokes MD     Iron deficient         Hypertension (Chronic)    Morbid obesity (CMS/[facility]) (Chronic)    Renal failure    Overview Addendum 8/24/2017  2:20 AM by Esperanza Villegas APRN     Renal Biopsy 08/10/17: Diabetic neuropathy, mild to moderate atherosclerosis            Anemia in stage 4 chronic kidney disease (CMS/HCC)    NATALIO (acute kidney injury) (CMS/[facility])    CKD (chronic kidney disease) stage 4, GFR 15-29 ml/min (CMS/[facility])    Diabetes mellitus, type II (CMS/[facility]) (Chronic)    History of UTI    History of Clostridium difficile infection            Assessment & Plan:    1. Consult NAL. Cr is unchanged from rehab comparison in March, BUN slightly more, unclear if any GIB component, consider NATALIO on CKD IV vs ESRD.     2. Occult was negative, when here 8/2017 with anemia only 1/3 occult was positive and overall EGD+colonoscopy without explanation. On iron supplement. Consider need for EPO.     3. Denies any hx CHF and ECHO 9/2017 unrevealing, on bumex 2 mg PO BID and metolazone 5 mg PO daily and a fluid restriction (1500 ml daily). On coreg, amlodipine, hydralazine, isosorbide. On a daily PPI for GERD.     4. Will d/c her oral diabetes medication for renal and inpatient, and change to SSI. A1c 5/3/18 6.2. On statin.    5. Suspect she finished her rehab days and is at [facility] for LTC in medicaid bed. Consult CM/SW for discharge planning. Needs placement if not for chronic debility. Morbidly obese, chronic fatigue/weakness.    6. Denies any psych hx. Anxiety hx in chart. On risperidal HS, klonopin daily, and allergies of chart to geodon/seroquel/haldol with unknown reactions - \"aren't those for crazy people?... I don't think I've " "ever taken those.\"    7. Hx recurrent UTIs, will check UA. Hx c diff last fall 2017, denies any recent diarrhea.    DVT prophylaxis: Scuds, hold pharmacological with anemia workup.    CODE STATUS:  Full code / full support, no advanced directives, one daughter is next of kin.  There are no questions and answers to display.       Admission Status:  I believe this patient meets INPATIENT status due to the need for care which can only be reasonably provided in an hospital setting such as aggressive/expedited ancillary services and/or consultation services, the necessity for IV medications, close physician monitoring and/or the possible need for procedures.  In such, I feel patient’s risk for adverse outcomes and need for care warrant INPATIENT evaluation and predict the patient’s care encounter to likely last beyond 2 midnights.        Brief Attending Admission Attestation     I have seen and examined the patient, performing an independent face-to-face diagnostic evaluation with plan of care reviewed and developed with the advanced practice clinician (APC).      Brief Summary Statement/HPI:   Joy Bueno is a 68 y.o. female  with a past medical history of CK D, anemia of chronic disease, diabetes mellitus and hypertension who presents to the ED after abnormal laboratory data was obtained at [facility] today.  The patient states that she has been feeling weak and somewhat decreased from baseline for over a week.  She states that she has been on a fluid restricted diet and feels she is dehydrated.  She denies cough, fever, chest pain, difficulty breathing, hematochezia, melena, hematemesis.  She reports that she is eating a moderate diet.  She was seen by nephrologist approximately 1 year ago per her report for she had a kidney biopsy and results were indeterminate.    Attending Physical Exam:  Constitutional: No acute distress, awake, alert pleasant  Eyes: PERRLA, sclerae anicteric, no conjunctival " injection  HENT: NCAT, mucous membranes dry  Neck: Supple, no thyromegaly, no lymphadenopathy, trachea midline  Respiratory: Moderate aeration  Cardiovascular: RRR, no murmurs, rubs, or gallops, palpable pedal pulses bilaterally  Gastrointestinal: Positive bowel sounds, soft, nontender, nondistended  Musculoskeletal: bilateral ankle edema, no clubbing or cyanosis to extremities  Psychiatric: Appropriate affect, cooperative  Neurologic: Oriented x 3, strength symmetric in all extremities, Cranial Nerves grossly intact to confrontation, speech clear  Skin: No rashes        Brief Assessment/Plan :  See above for further detailed assessment and plan developed with APC which I have reviewed and/or edited.      Electronically signed by Charisse Roy DO, 08/03/18, 1:57 AM.           Electronically signed by Charisse Roy DO, 08/02/18, 11:41 PM.

## 2018-08-03 NOTE — PLAN OF CARE
Problem: Fall Risk (Adult)  Goal: Identify Related Risk Factors and Signs and Symptoms  Outcome: Ongoing (interventions implemented as appropriate)   08/03/18 0327   Fall Risk (Adult)   Related Risk Factors (Fall Risk) fatigue/slow reaction;gait/mobility problems;history of falls;environment unfamiliar   Signs and Symptoms (Fall Risk) presence of risk factors     Goal: Absence of Fall  Outcome: Ongoing (interventions implemented as appropriate)   08/03/18 0327   Fall Risk (Adult)   Absence of Fall making progress toward outcome

## 2018-08-03 NOTE — PLAN OF CARE
Problem: Skin Injury Risk (Adult)  Intervention: Promote/Optimize Nutrition   08/03/18 1849   Nutrition Interventions   Oral Nutrition Promotion physical activity promoted     Intervention: Prevent/Manage Excess Moisture   08/03/18 1849   Hygiene Care   Perineal Care absorbent pad changed   Bathing/Skin Care linen changed   Skin Interventions   Skin Protection skin-to-skin areas padded     Intervention: Maintain Head of Bed Elevation Less Than 30 Degrees as Tolerated   08/03/18 1849   Positioning   Head of Bed (HOB) HOB at 30-45 degrees     Intervention: Prevent/Minimize Shear/Friction Injuries   08/03/18 1849   Skin Interventions   Pressure Reduction Devices pressure-redistributing mattress utilized   Positioning   Positioning/Transfer Devices pillows     Intervention: Prevent or Minimize Pressure   08/03/18 1849   Skin Interventions   Pressure Reduction Techniques frequent weight shift encouraged;sit time limited to 2 hours;weight shift assistance provided   Positioning   Body Position supine, legs elevated       Goal: Skin Health and Integrity  Outcome: Ongoing (interventions implemented as appropriate)   08/03/18 1849   Skin Injury Risk (Adult)   Skin Health and Integrity making progress toward outcome

## 2018-08-03 NOTE — PROGRESS NOTES
Discharge Planning Assessment  [facility]     Patient Name: Joy Bueno  MRN: [id number]  Today's Date: 8/3/2018    Admit Date: 8/2/2018          Discharge Needs Assessment     Row Name 08/03/18 1223       Living Environment    Lives With facility resident    Current Living Arrangements residential facility    Primary Care Provided by other (see comments)    Provides Primary Care For no one, unable/limited ability to care for self    Family Caregiver if Needed child(balaji), adult       Transition Planning    Patient/Family Anticipates Transition to long term care facility       Discharge Needs Assessment    Concerns to be Addressed discharge planning            Discharge Plan     Row Name 08/03/18 1218       Plan    Plan [facility]    Plan Comments CM has attempted to see patient twice but she has been off the floor.  Attempted to call daughter for discharge planning but there was no answer.  I left a voicemail with my extension.  I have spoken with Elen at [facility] and she spoke with her  and confirmed that patient may return to her long term care bed over the weekend if medically ready.  Will need to contact them at [phone] if patient returns this weekend.  CM will assess for transportation needs.          Destination     No service coordination in this encounter.      Durable Medical Equipment     No service coordination in this encounter.      Dialysis/Infusion     No service coordination in this encounter.      Home Medical Care     No service coordination in this encounter.      Social Care     No service coordination in this encounter.                Demographic Summary     Row Name 08/03/18 1221       General Information    Admission Type inpatient    Arrived From long term care    Referral Source physician    Reason for Consult discharge planning            Functional Status    No documentation.           Psychosocial    No documentation.           Abuse/Neglect    No  documentation.           Legal    No documentation.           Substance Abuse    No documentation.           Patient Forms    No documentation.         Edyta Maciel

## 2018-08-04 LAB
ALBUMIN SERPL-MCNC: 3.05 G/DL (ref 3.2–4.8)
ANION GAP SERPL CALCULATED.3IONS-SCNC: 15 MMOL/L (ref 3–11)
BUN BLD-MCNC: 112 MG/DL (ref 9–23)
BUN/CREAT SERPL: 27.9 (ref 7–25)
CALCIUM SPEC-SCNC: 7.8 MG/DL (ref 8.7–10.4)
CHLORIDE SERPL-SCNC: 107 MMOL/L (ref 99–109)
CO2 SERPL-SCNC: 22 MMOL/L (ref 20–31)
CREAT BLD-MCNC: 4.02 MG/DL (ref 0.6–1.3)
DEPRECATED RDW RBC AUTO: 47.3 FL (ref 37–54)
ERYTHROCYTE [DISTWIDTH] IN BLOOD BY AUTOMATED COUNT: 13.5 % (ref 11.3–14.5)
GFR SERPL CREATININE-BSD FRML MDRD: 13 ML/MIN/1.73
GLUCOSE BLD-MCNC: 178 MG/DL (ref 70–100)
GLUCOSE BLDC GLUCOMTR-MCNC: 148 MG/DL (ref 70–130)
GLUCOSE BLDC GLUCOMTR-MCNC: 202 MG/DL (ref 70–130)
GLUCOSE BLDC GLUCOMTR-MCNC: 235 MG/DL (ref 70–130)
GLUCOSE BLDC GLUCOMTR-MCNC: 247 MG/DL (ref 70–130)
HCT VFR BLD AUTO: 21.4 % (ref 34.5–44)
HCT VFR BLD AUTO: 25.8 % (ref 34.5–44)
HGB BLD-MCNC: 6.8 G/DL (ref 11.5–15.5)
HGB BLD-MCNC: 8.3 G/DL (ref 11.5–15.5)
MCH RBC QN AUTO: 30.6 PG (ref 27–31)
MCHC RBC AUTO-ENTMCNC: 31.8 G/DL (ref 32–36)
MCV RBC AUTO: 96.4 FL (ref 80–99)
PHOSPHATE SERPL-MCNC: 6.7 MG/DL (ref 2.4–5.1)
PLATELET # BLD AUTO: 230 10*3/MM3 (ref 150–450)
PMV BLD AUTO: 9.6 FL (ref 6–12)
POTASSIUM BLD-SCNC: 3.5 MMOL/L (ref 3.5–5.5)
RBC # BLD AUTO: 2.22 10*6/MM3 (ref 3.89–5.14)
SODIUM BLD-SCNC: 144 MMOL/L (ref 132–146)
WBC NRBC COR # BLD: 11.32 10*3/MM3 (ref 3.5–10.8)

## 2018-08-04 PROCEDURE — 25010000002 CEFTRIAXONE PER 250 MG: Performed by: HOSPITALIST

## 2018-08-04 PROCEDURE — 85014 HEMATOCRIT: CPT | Performed by: HOSPITALIST

## 2018-08-04 PROCEDURE — 63710000001 INSULIN LISPRO (HUMAN) PER 5 UNITS: Performed by: HOSPITALIST

## 2018-08-04 PROCEDURE — 99233 SBSQ HOSP IP/OBS HIGH 50: CPT | Performed by: HOSPITALIST

## 2018-08-04 PROCEDURE — 86900 BLOOD TYPING SEROLOGIC ABO: CPT

## 2018-08-04 PROCEDURE — 85018 HEMOGLOBIN: CPT | Performed by: HOSPITALIST

## 2018-08-04 PROCEDURE — P9016 RBC LEUKOCYTES REDUCED: HCPCS

## 2018-08-04 PROCEDURE — 36430 TRANSFUSION BLD/BLD COMPNT: CPT

## 2018-08-04 PROCEDURE — 80069 RENAL FUNCTION PANEL: CPT | Performed by: INTERNAL MEDICINE

## 2018-08-04 PROCEDURE — 99232 SBSQ HOSP IP/OBS MODERATE 35: CPT | Performed by: INTERNAL MEDICINE

## 2018-08-04 PROCEDURE — 82962 GLUCOSE BLOOD TEST: CPT

## 2018-08-04 PROCEDURE — 85027 COMPLETE CBC AUTOMATED: CPT | Performed by: HOSPITALIST

## 2018-08-04 RX ORDER — CALCITRIOL 0.25 UG/1
0.25 CAPSULE, LIQUID FILLED ORAL DAILY
Status: DISCONTINUED | OUTPATIENT
Start: 2018-08-04 | End: 2018-08-06 | Stop reason: HOSPADM

## 2018-08-04 RX ORDER — SACCHAROMYCES BOULARDII 250 MG
250 CAPSULE ORAL 2 TIMES DAILY
Status: DISCONTINUED | OUTPATIENT
Start: 2018-08-04 | End: 2018-08-06 | Stop reason: HOSPADM

## 2018-08-04 RX ORDER — CEFTRIAXONE SODIUM 1 G/50ML
1 INJECTION, SOLUTION INTRAVENOUS EVERY 24 HOURS
Status: DISCONTINUED | OUTPATIENT
Start: 2018-08-04 | End: 2018-08-05

## 2018-08-04 RX ORDER — DEXTROSE MONOHYDRATE 25 G/50ML
25 INJECTION, SOLUTION INTRAVENOUS
Status: DISCONTINUED | OUTPATIENT
Start: 2018-08-04 | End: 2018-08-06 | Stop reason: HOSPADM

## 2018-08-04 RX ORDER — FUROSEMIDE 10 MG/ML
40 INJECTION INTRAMUSCULAR; INTRAVENOUS ONCE
Status: DISCONTINUED | OUTPATIENT
Start: 2018-08-04 | End: 2018-08-04

## 2018-08-04 RX ORDER — NICOTINE POLACRILEX 4 MG
15 LOZENGE BUCCAL
Status: DISCONTINUED | OUTPATIENT
Start: 2018-08-04 | End: 2018-08-06 | Stop reason: HOSPADM

## 2018-08-04 RX ADMIN — Medication 325 MG: at 09:21

## 2018-08-04 RX ADMIN — CARVEDILOL 25 MG: 12.5 TABLET, FILM COATED ORAL at 09:21

## 2018-08-04 RX ADMIN — CLONAZEPAM 0.25 MG: 0.5 TABLET ORAL at 20:45

## 2018-08-04 RX ADMIN — CETIRIZINE HYDROCHLORIDE 10 MG: 10 TABLET, FILM COATED ORAL at 09:20

## 2018-08-04 RX ADMIN — HYDRALAZINE HYDROCHLORIDE 25 MG: 25 TABLET ORAL at 20:47

## 2018-08-04 RX ADMIN — AMLODIPINE BESYLATE 5 MG: 5 TABLET ORAL at 09:20

## 2018-08-04 RX ADMIN — INSULIN LISPRO 3 UNITS: 100 INJECTION, SOLUTION INTRAVENOUS; SUBCUTANEOUS at 20:45

## 2018-08-04 RX ADMIN — SERTRALINE HYDROCHLORIDE 50 MG: 50 TABLET ORAL at 09:21

## 2018-08-04 RX ADMIN — Medication 250 MG: at 20:44

## 2018-08-04 RX ADMIN — RISPERIDONE 0.12 MG: 0.25 TABLET ORAL at 20:44

## 2018-08-04 RX ADMIN — CEFTRIAXONE SODIUM 1 G: 1 INJECTION, SOLUTION INTRAVENOUS at 12:36

## 2018-08-04 RX ADMIN — ISOSORBIDE MONONITRATE 30 MG: 30 TABLET, EXTENDED RELEASE ORAL at 09:21

## 2018-08-04 RX ADMIN — OXYCODONE HYDROCHLORIDE AND ACETAMINOPHEN 250 MG: 500 TABLET ORAL at 09:21

## 2018-08-04 RX ADMIN — PANTOPRAZOLE SODIUM 40 MG: 40 TABLET, DELAYED RELEASE ORAL at 05:36

## 2018-08-04 RX ADMIN — Medication 250 MG: at 12:36

## 2018-08-04 RX ADMIN — HYDRALAZINE HYDROCHLORIDE 25 MG: 25 TABLET ORAL at 05:36

## 2018-08-04 RX ADMIN — ATORVASTATIN CALCIUM 10 MG: 10 TABLET, FILM COATED ORAL at 20:44

## 2018-08-04 RX ADMIN — CARVEDILOL 25 MG: 12.5 TABLET, FILM COATED ORAL at 18:14

## 2018-08-04 NOTE — PROGRESS NOTES
"GI Daily Progress Note  Subjective:    Chief Complaint:  F/u anemia    The patient has no further nausea. Is eating. No signs of GI bleeding.    Objective:    /73   Pulse 75   Temp 97.4 °F (36.3 °C) (Oral)   Resp 16   Ht 162.6 cm (64\")   Wt 135 kg (296 lb 15.4 oz)   SpO2 100%   BMI 50.97 kg/m²     Physical Exam   Constitutional: She is oriented to person, place, and time. She appears well-developed.   Appears chronically ill   HENT:   Head: Normocephalic.   Mouth/Throat: No oropharyngeal exudate.   Eyes: Conjunctivae are normal. No scleral icterus.   Neck: Normal range of motion.   Cardiovascular: Normal rate.    Pulmonary/Chest: Effort normal and breath sounds normal. No respiratory distress. She has no wheezes. She has no rales.   Abdominal: Soft. Bowel sounds are normal. There is no tenderness.   Obesity limits exam for organomegaly or masses.   Musculoskeletal: She exhibits no edema.   Neurological: She is alert and oriented to person, place, and time.   Skin: Skin is warm and dry. Capillary refill takes less than 2 seconds. No rash noted.   Psychiatric: She has a normal mood and affect. Her behavior is normal.   Nursing note and vitals reviewed.      Lab  Lab Results   Component Value Date    WBC 11.32 (H) 08/04/2018    HGB 8.3 (L) 08/04/2018    HGB 6.8 (L) 08/04/2018    HGB 6.9 (L) 08/03/2018    MCV 96.4 08/04/2018     08/04/2018    INR 0.97 08/02/2018    INR 1.06 09/06/2017    INR 0.95 08/23/2017    INR 0.98 08/10/2017    INR 0.96 08/05/2017       Lab Results   Component Value Date    GLUCOSE 178 (H) 08/04/2018     (H) 08/04/2018    CREATININE 4.02 (H) 08/04/2018    EGFRIFAFRI 13 (L) 08/04/2018    BCR 27.9 (H) 08/04/2018    CO2 22.0 08/04/2018    CALCIUM 7.8 (L) 08/04/2018    ALBUMIN 3.05 (L) 08/04/2018    ALKPHOS 110 (H) 08/02/2018    BILITOT 0.3 08/02/2018    ALT 11 08/02/2018    AST 9 08/02/2018       Assessment:    1) Chronic normocytic anemia. Iron stores adequate and retic " count appropriately elevated. Blood loss would be suggested, however, she has no clinical signs of such. She has appropriate rise in Hgb after transfusion.  2) N/V, improved.    Plan:    Defer endoscopy at this time.  If signs of GI bleeding or acute drop in Hgb, would repeat EGD and place a small bowel capsule camera endoscopically.    Mark I. Brunner, MD  08/04/18  10:45 AM

## 2018-08-04 NOTE — PROGRESS NOTES
[facility] Medicine Services  PROGRESS NOTE    Patient Name: Joy Bueno  : 1950  MRN: [id number]    Date of Admission: 2018  Length of Stay: 2  Primary Care Physician: Provider, No Known    Subjective   Subjective     CC:  Anemia    HPI:  Denies bleeding. No reported blood lose. Some urinary hesitancy. No f/c. Denies n/v. Denies dyspnea. No other issues.    Review of Systems    Otherwise ROS is negative except as mentioned in the HPI.    Objective   Objective     Vital Signs:   Temp:  [97.4 °F (36.3 °C)-98.3 °F (36.8 °C)] 97.4 °F (36.3 °C)  Heart Rate:  [66-77] 75  Resp:  [16-20] 16  BP: (125-163)/(50-76) 150/73        Physical Exam:  NAD, alert and oriented, sitting up in bed  OP clear, MMM  PERRL  Neck supple  No LAD  RRR  Decreased at bases otherwise clear  +BS, ND, NT  SUTTON  No c/c  No rashes  Normal affect    Results Reviewed:  I have personally reviewed current lab, radiology, and data and agree.      Results from last 7 days  Lab Units 18  0948 18  1858   WBC 10*3/mm3  --  11.32*  --   --  12.19*  --  11.59*   HEMOGLOBIN g/dL 8.3* 6.8* 6.9*  < > 7.2*  < > 8.3*   HEMATOCRIT % 25.8* 21.4* 21.9*  < > 22.7*  < > 25.5*   PLATELETS 10*3/mm3  --  230  --   --  262  --  270   INR   --   --   --   --   --   --  0.97   < > = values in this interval not displayed.    Results from last 7 days  Lab Units 18  1858   SODIUM mmol/L 144 143 143   POTASSIUM mmol/L 3.5 3.5 3.6   CHLORIDE mmol/L 107 107 106   CO2 mmol/L 22.0 24.0 26.0   BUN mg/dL 112* 114* 116*   CREATININE mg/dL 4.02* 3.58* 3.82*   GLUCOSE mg/dL 178* 162* 164*   CALCIUM mg/dL 7.8* 8.2* 9.2   ALT (SGPT) U/L  --   --  11   AST (SGOT) U/L  --   --  9     Estimated Creatinine Clearance: 18.6 mL/min (A) (by C-G formula based on SCr of 4.02 mg/dL (H)).  No results found for: BNP    Microbiology Results Abnormal     None           Imaging Results (last 24 hours)     ** No results found for the last 24 hours. **        Results for orders placed during the hospital encounter of 09/02/17   Adult Transthoracic Echo Complete    Narrative · Left ventricular wall thickness is consistent with mild concentric   hypertrophy.  · Left atrial cavity size is mildly dilated.  · Mild mitral valve regurgitation is present  · calcification of the aortic valve  · Mild tricuspid valve regurgitation is present.          I have reviewed the medications.    Assessment/Plan   Assessment / Plan     Hospital Problem List     * (Principal)Anemia    Overview Addendum 8/28/2017 11:03 AM by Piyush Stokes MD     Iron deficient         Hypertension (Chronic)    Morbid obesity (CMS/HCC) (Chronic)    Renal failure    Overview Addendum 8/24/2017  2:20 AM by Esperanza Villegas APRN     Renal Biopsy 08/10/17: Diabetic neuropathy, mild to moderate atherosclerosis            Anemia in stage 4 chronic kidney disease (CMS/HCC)    NATALIO (acute kidney injury) (CMS/[facility])    CKD (chronic kidney disease) stage 4, GFR 15-29 ml/min (CMS/[facility])    Diabetes mellitus, type II (CMS/[facility]) (Chronic)    History of UTI    History of Clostridium difficile infection    Tinea capitis    History of respiratory failure, since off home oxygen             Brief Hospital Course to date:  Joy Bueno is a 68 y.o. female here with acute on chronic anemia, and NATALIO/CKD.      Assessment & Plan:  Anemia  --no obvious GI blood loss  --good response to tranfusion  --possibly AOCD, but decent retic count  --monitor  --if drops, consider EGD/Pill cam  NATALIO/CKD  --likely needs dialysis per NAL  --patient refuses  --monitor  UTI  --Rocephin  DM  HTN  Obesity  -  Monitor hemoglobin, renal function, and urine culture. If stable/stabilizes, back to NH tomorrow.    DVT Prophylaxis:  SCDS only    CODE STATUS:   Code Status and Medical Interventions:   Ordered at: 08/03/18 0159     Level Of Support Discussed With:     Patient     Code Status:    CPR     Medical Interventions (Level of Support Prior to Arrest):    Full       Disposition: I expect the patient to be discharged in 1-2 days.      Electronically signed by Valentino Conway MD, 08/04/18, 10:43 AM.

## 2018-08-04 NOTE — PROGRESS NOTES
Continued Stay Note  [facility]     Patient Name: Joy Bueno  MRN: [id number]  Today's Date: 8/4/2018    Admit Date: 8/2/2018          Discharge Plan     Row Name 08/04/18 1049       Plan    Plan Comments I resheduled the ambulance for 1400 on Sunday 8-5-18 if ready.              Discharge Codes    No documentation.           Lizz Ennis RN

## 2018-08-04 NOTE — PROGRESS NOTES
"   LOS: 2 days    Patient Care Team:  Provider, No Known as PCP - General    Reason For Visit:  NATALIO ON STAGE 4 CKD. PROBABLE ESRD.  Subjective           Review of Systems:    Pulm: No soa   CV:  No CP      Objective       amLODIPine 5 mg Oral Daily   atorvastatin 10 mg Oral Nightly   calcitriol 0.25 mcg Oral Daily   carvedilol 25 mg Oral BID With Meals   ceftriaxone 1 g Intravenous Q24H   cetirizine 10 mg Oral Daily   epoetin porter 20,000 Units Subcutaneous Once per day on Mon Wed Fri   ferrous sulfate 325 mg Oral Daily   hydrALAZINE 25 mg Oral Q8H   isosorbide mononitrate 30 mg Oral Daily   pantoprazole 40 mg Oral Q AM   risperiDONE 0.125 mg Oral Nightly   [START ON 8/6/2018] selenium sulfide  Topical Once per day on Mon Thu   sertraline 50 mg Oral Daily   vitamin C 250 mg Oral Daily            Vital Signs:  Blood pressure 150/73, pulse 75, temperature 97.4 °F (36.3 °C), temperature source Oral, resp. rate 16, height 162.6 cm (64\"), weight 135 kg (296 lb 15.4 oz), SpO2 100 %.    Flowsheet Rows      First Filed Value   Admission Height  162.6 cm (64\") Documented at 08/02/2018 1749   Admission Weight  124 kg (273 lb) Documented at 08/02/2018 1744          08/03 0701 - 08/04 0700  In: 480 [P.O.:480]  Out: 700 [Urine:700]    Physical Exam:    General Appearance: NAD, alert and cooperative, Ox3  Eyes: PER, conjunctivae and sclerae normal, no icterus  Lungs: respirations regular and unlabored, no crepitus, clear to auscultation  Heart/CV: regular rhythm & normal rate, no murmur, no gallop, no rub and TR edema  Abdomen: not distended, soft, non-tender, no masses,  bowel sounds present  Skin: No rash, Warm and dry    Radiology:            Labs:    Results from last 7 days  Lab Units 08/04/18  0948 08/04/18  0210 08/03/18  2212  08/03/18  0417  08/02/18  1858   WBC 10*3/mm3  --  11.32*  --   --  12.19*  --  11.59*   HEMOGLOBIN g/dL 8.3* 6.8* 6.9*  < > 7.2*  < > 8.3*   HEMATOCRIT % 25.8* 21.4* 21.9*  < > 22.7*  < > 25.5* "   PLATELETS 10*3/mm3  --  230  --   --  262  --  270   < > = values in this interval not displayed.    Results from last 7 days  Lab Units 08/04/18  0210 08/03/18  0417 08/02/18  1858   SODIUM mmol/L 144 143 143   POTASSIUM mmol/L 3.5 3.5 3.6   CHLORIDE mmol/L 107 107 106   CO2 mmol/L 22.0 24.0 26.0   BUN mg/dL 112* 114* 116*   CREATININE mg/dL 4.02* 3.58* 3.82*   CALCIUM mg/dL 7.8* 8.2* 9.2   PHOSPHORUS mg/dL 6.7*  --   --    ALBUMIN g/dL 3.05*  --  3.83       Results from last 7 days  Lab Units 08/04/18  0210   GLUCOSE mg/dL 178*         Results from last 7 days  Lab Units 08/02/18  1858   ALK PHOS U/L 110*   BILIRUBIN mg/dL 0.3   ALT (SGPT) U/L 11   AST (SGOT) U/L 9             Results from last 7 days  Lab Units 08/03/18  1016   COLOR UA  Yellow   CLARITY UA  Turbid*   PH, URINE  6.0   SPECIFIC GRAVITY, URINE  1.011   GLUCOSE UA  Negative   KETONES UA  Negative   BILIRUBIN UA  Negative   PROTEIN UA  >=300 mg/dL (3+)*   BLOOD UA  Trace*   LEUKOCYTES UA  Large (3+)*   NITRITE UA  Negative       Estimated Creatinine Clearance: 18.6 mL/min (A) (by C-G formula based on SCr of 4.02 mg/dL (H)).      Assessment     Principal Problem:    Anemia  Active Problems:    Hypertension    Morbid obesity (CMS/[facility])    Renal failure    Anemia in stage 4 chronic kidney disease (CMS/[facility])    NATALIO (acute kidney injury) (CMS/[facility])    CKD (chronic kidney disease) stage 4, GFR 15-29 ml/min (CMS/[facility])    Diabetes mellitus, type II (CMS/[facility])    History of UTI    History of Clostridium difficile infection    Tinea capitis    History of respiratory failure, since off home oxygen            Impression: NATALIO ON STAGE 4 CKD ( BX PROVEN DM NEPHROPATHY ). THIS IS PROBABLY ESRD. GFR 13. HIGH BUN. ANEMIA.              Recommendations: NEEDS TO GET A TUNNELED HD CATH AND AVF AND START DIALYSIS SOON. PATIENT WILL CONSIDER AND LET US KNOW.      Rocky Nova MD  08/04/18  10:39 AM

## 2018-08-04 NOTE — PLAN OF CARE
Problem: Fall Risk (Adult)  Goal: Identify Related Risk Factors and Signs and Symptoms  Outcome: Ongoing (interventions implemented as appropriate)   08/03/18 0327   Fall Risk (Adult)   Related Risk Factors (Fall Risk) fatigue/slow reaction;gait/mobility problems;history of falls;environment unfamiliar   Signs and Symptoms (Fall Risk) presence of risk factors     Goal: Absence of Fall  Outcome: Ongoing (interventions implemented as appropriate)   08/03/18 0327   Fall Risk (Adult)   Absence of Fall making progress toward outcome       Problem: Patient Care Overview  Goal: Plan of Care Review  Outcome: Ongoing (interventions implemented as appropriate)   08/03/18 1119 08/03/18 2000   Coping/Psychosocial   Plan of Care Reviewed With --  patient   OTHER   Outcome Summary Pt reports baseline of independent bed mobility and recent progression to taking babysteps in rehab in April with RWx. Pt has been at LT recently with dependence for all ADLs per chart. Pt is assisted over to chair with assist of three this date and sat prior to completing turn. Pt will require rehab for improvement of functional mobility. May be limited by pt motivation. --        Problem: Skin Injury Risk (Adult)  Goal: Skin Health and Integrity  Outcome: Ongoing (interventions implemented as appropriate)   08/03/18 0506   Skin Injury Risk (Adult)   Skin Health and Integrity making progress toward outcome

## 2018-08-05 LAB
ABO + RH BLD: NORMAL
ALBUMIN SERPL-MCNC: 3.27 G/DL (ref 3.2–4.8)
ANION GAP SERPL CALCULATED.3IONS-SCNC: 11 MMOL/L (ref 3–11)
BACTERIA SPEC AEROBE CULT: ABNORMAL
BH BB BLOOD EXPIRATION DATE: NORMAL
BH BB BLOOD TYPE BARCODE: 7300
BH BB DISPENSE STATUS: NORMAL
BH BB PRODUCT CODE: NORMAL
BH BB UNIT NUMBER: NORMAL
BUN BLD-MCNC: 114 MG/DL (ref 9–23)
BUN/CREAT SERPL: 29.5 (ref 7–25)
CALCIUM SPEC-SCNC: 8 MG/DL (ref 8.7–10.4)
CHLORIDE SERPL-SCNC: 108 MMOL/L (ref 99–109)
CO2 SERPL-SCNC: 23 MMOL/L (ref 20–31)
CREAT BLD-MCNC: 3.87 MG/DL (ref 0.6–1.3)
GFR SERPL CREATININE-BSD FRML MDRD: 14 ML/MIN/1.73
GLUCOSE BLD-MCNC: 133 MG/DL (ref 70–100)
GLUCOSE BLDC GLUCOMTR-MCNC: 139 MG/DL (ref 70–130)
GLUCOSE BLDC GLUCOMTR-MCNC: 190 MG/DL (ref 70–130)
GLUCOSE BLDC GLUCOMTR-MCNC: 233 MG/DL (ref 70–130)
GLUCOSE BLDC GLUCOMTR-MCNC: 243 MG/DL (ref 70–130)
HCT VFR BLD AUTO: 24.1 % (ref 34.5–44)
HGB BLD-MCNC: 8 G/DL (ref 11.5–15.5)
INR PPP: 0.98 (ref 0.91–1.09)
PHOSPHATE SERPL-MCNC: 7.2 MG/DL (ref 2.4–5.1)
POTASSIUM BLD-SCNC: 3.3 MMOL/L (ref 3.5–5.5)
PROTHROMBIN TIME: 10.3 SECONDS (ref 9.6–11.5)
SODIUM BLD-SCNC: 142 MMOL/L (ref 132–146)
UNIT  ABO: NORMAL
UNIT  RH: NORMAL

## 2018-08-05 PROCEDURE — 85018 HEMOGLOBIN: CPT | Performed by: HOSPITALIST

## 2018-08-05 PROCEDURE — 82962 GLUCOSE BLOOD TEST: CPT

## 2018-08-05 PROCEDURE — 80069 RENAL FUNCTION PANEL: CPT | Performed by: INTERNAL MEDICINE

## 2018-08-05 PROCEDURE — 85014 HEMATOCRIT: CPT | Performed by: HOSPITALIST

## 2018-08-05 PROCEDURE — 85610 PROTHROMBIN TIME: CPT | Performed by: INTERNAL MEDICINE

## 2018-08-05 PROCEDURE — 99233 SBSQ HOSP IP/OBS HIGH 50: CPT | Performed by: HOSPITALIST

## 2018-08-05 PROCEDURE — 25010000002 ERTAPENEM PER 500 MG: Performed by: HOSPITALIST

## 2018-08-05 RX ORDER — DIAPER,BRIEF,INFANT-TODD,DISP
EACH MISCELLANEOUS EVERY 12 HOURS SCHEDULED
Status: DISCONTINUED | OUTPATIENT
Start: 2018-08-05 | End: 2018-08-06 | Stop reason: HOSPADM

## 2018-08-05 RX ORDER — POTASSIUM CHLORIDE 750 MG/1
40 CAPSULE, EXTENDED RELEASE ORAL ONCE
Status: COMPLETED | OUTPATIENT
Start: 2018-08-05 | End: 2018-08-05

## 2018-08-05 RX ADMIN — Medication 250 MG: at 09:00

## 2018-08-05 RX ADMIN — PANTOPRAZOLE SODIUM 40 MG: 40 TABLET, DELAYED RELEASE ORAL at 06:24

## 2018-08-05 RX ADMIN — INSULIN LISPRO 2 UNITS: 100 INJECTION, SOLUTION INTRAVENOUS; SUBCUTANEOUS at 21:38

## 2018-08-05 RX ADMIN — HYDROCORTISONE: 10 CREAM TOPICAL at 02:15

## 2018-08-05 RX ADMIN — INSULIN LISPRO 3 UNITS: 100 INJECTION, SOLUTION INTRAVENOUS; SUBCUTANEOUS at 18:49

## 2018-08-05 RX ADMIN — CLONAZEPAM 0.25 MG: 0.5 TABLET ORAL at 21:37

## 2018-08-05 RX ADMIN — AMLODIPINE BESYLATE 5 MG: 5 TABLET ORAL at 09:00

## 2018-08-05 RX ADMIN — HYDRALAZINE HYDROCHLORIDE 25 MG: 25 TABLET ORAL at 06:24

## 2018-08-05 RX ADMIN — HYDRALAZINE HYDROCHLORIDE 25 MG: 25 TABLET ORAL at 21:38

## 2018-08-05 RX ADMIN — HYDRALAZINE HYDROCHLORIDE 25 MG: 25 TABLET ORAL at 15:14

## 2018-08-05 RX ADMIN — HYDROCORTISONE: 10 CREAM TOPICAL at 21:42

## 2018-08-05 RX ADMIN — INSULIN LISPRO 3 UNITS: 100 INJECTION, SOLUTION INTRAVENOUS; SUBCUTANEOUS at 12:29

## 2018-08-05 RX ADMIN — CARVEDILOL 25 MG: 12.5 TABLET, FILM COATED ORAL at 18:49

## 2018-08-05 RX ADMIN — ACETAMINOPHEN 650 MG: 325 TABLET, FILM COATED ORAL at 00:30

## 2018-08-05 RX ADMIN — CETIRIZINE HYDROCHLORIDE 10 MG: 10 TABLET, FILM COATED ORAL at 09:00

## 2018-08-05 RX ADMIN — Medication 325 MG: at 09:00

## 2018-08-05 RX ADMIN — RISPERIDONE 0.12 MG: 0.25 TABLET ORAL at 21:38

## 2018-08-05 RX ADMIN — ISOSORBIDE MONONITRATE 30 MG: 30 TABLET, EXTENDED RELEASE ORAL at 09:00

## 2018-08-05 RX ADMIN — SODIUM CHLORIDE 500 MG: 9 INJECTION, SOLUTION INTRAVENOUS at 09:08

## 2018-08-05 RX ADMIN — CARVEDILOL 25 MG: 12.5 TABLET, FILM COATED ORAL at 08:59

## 2018-08-05 RX ADMIN — OXYCODONE HYDROCHLORIDE AND ACETAMINOPHEN 250 MG: 500 TABLET ORAL at 09:00

## 2018-08-05 RX ADMIN — CALCITRIOL 0.25 MCG: 0.25 CAPSULE ORAL at 09:00

## 2018-08-05 RX ADMIN — ATORVASTATIN CALCIUM 10 MG: 10 TABLET, FILM COATED ORAL at 21:38

## 2018-08-05 RX ADMIN — HYDROCORTISONE: 10 CREAM TOPICAL at 09:01

## 2018-08-05 RX ADMIN — Medication 250 MG: at 21:00

## 2018-08-05 RX ADMIN — SERTRALINE HYDROCHLORIDE 50 MG: 50 TABLET ORAL at 08:59

## 2018-08-05 RX ADMIN — POTASSIUM CHLORIDE 40 MEQ: 750 CAPSULE, EXTENDED RELEASE ORAL at 12:30

## 2018-08-05 NOTE — PROGRESS NOTES
"   LOS: 3 days    Patient Care Team:  Provider, No Known as PCP - General    Reason For Visit:  F/U STAGE 5 CKD  Subjective           Review of Systems:    Pulm: No soa   CV:  No CP      Objective       amLODIPine 5 mg Oral Daily   atorvastatin 10 mg Oral Nightly   calcitriol 0.25 mcg Oral Daily   carvedilol 25 mg Oral BID With Meals   cetirizine 10 mg Oral Daily   epoetin porter 20,000 Units Subcutaneous Once per day on Mon Wed Fri   ertapenem 500 mg Intravenous Q24H   ferrous sulfate 325 mg Oral Daily   hydrALAZINE 25 mg Oral Q8H   hydrocortisone  Topical Q12H   insulin lispro 0-7 Units Subcutaneous 4x Daily With Meals & Nightly   isosorbide mononitrate 30 mg Oral Daily   pantoprazole 40 mg Oral Q AM   potassium chloride 40 mEq Oral Once   risperiDONE 0.125 mg Oral Nightly   saccharomyces boulardii 250 mg Oral BID   [START ON 8/6/2018] selenium sulfide  Topical Once per day on Mon Thu   sertraline 50 mg Oral Daily   vitamin C 250 mg Oral Daily            Vital Signs:  Blood pressure 161/76, pulse 80, temperature 98.1 °F (36.7 °C), temperature source Oral, resp. rate 18, height 162.6 cm (64\"), weight 135 kg (296 lb 15.4 oz), SpO2 100 %.    Flowsheet Rows      First Filed Value   Admission Height  162.6 cm (64\") Documented at 08/02/2018 1749   Admission Weight  124 kg (273 lb) Documented at 08/02/2018 1744          08/04 0701 - 08/05 0700  In: 360   Out: 1200 [Urine:1200]    Physical Exam:    General Appearance: NAD, alert and cooperative, Ox3  Eyes: PER, conjunctivae and sclerae normal, no icterus  Lungs: respirations regular and unlabored, no crepitus, clear to auscultation  Heart/CV: regular rhythm & normal rate, no murmur, no gallop, no rub and TR edema  Abdomen: not distended, soft, non-tender, no masses,  bowel sounds present. OBESE.  Skin: No rash, Warm and dry    Radiology:            Labs:    Results from last 7 days  Lab Units 08/05/18  0507 08/04/18  0948 08/04/18  0210  08/03/18  0417  08/02/18  1858   WBC " 10*3/mm3  --   --  11.32*  --  12.19*  --  11.59*   HEMOGLOBIN g/dL 8.0* 8.3* 6.8*  < > 7.2*  < > 8.3*   HEMATOCRIT % 24.1* 25.8* 21.4*  < > 22.7*  < > 25.5*   PLATELETS 10*3/mm3  --   --  230  --  262  --  270   < > = values in this interval not displayed.    Results from last 7 days  Lab Units 08/05/18  0507 08/04/18  0210 08/03/18  0417 08/02/18  1858   SODIUM mmol/L 142 144 143 143   POTASSIUM mmol/L 3.3* 3.5 3.5 3.6   CHLORIDE mmol/L 108 107 107 106   CO2 mmol/L 23.0 22.0 24.0 26.0   BUN mg/dL 114* 112* 114* 116*   CREATININE mg/dL 3.87* 4.02* 3.58* 3.82*   CALCIUM mg/dL 8.0* 7.8* 8.2* 9.2   PHOSPHORUS mg/dL 7.2* 6.7*  --   --    ALBUMIN g/dL 3.27 3.05*  --  3.83       Results from last 7 days  Lab Units 08/05/18  0507   GLUCOSE mg/dL 133*         Results from last 7 days  Lab Units 08/02/18  1858   ALK PHOS U/L 110*   BILIRUBIN mg/dL 0.3   ALT (SGPT) U/L 11   AST (SGOT) U/L 9             Results from last 7 days  Lab Units 08/03/18  1016   COLOR UA  Yellow   CLARITY UA  Turbid*   PH, URINE  6.0   SPECIFIC GRAVITY, URINE  1.011   GLUCOSE UA  Negative   KETONES UA  Negative   BILIRUBIN UA  Negative   PROTEIN UA  >=300 mg/dL (3+)*   BLOOD UA  Trace*   LEUKOCYTES UA  Large (3+)*   NITRITE UA  Negative       Estimated Creatinine Clearance: 19.3 mL/min (A) (by C-G formula based on SCr of 3.87 mg/dL (H)).      Assessment     Principal Problem:    Anemia  Active Problems:    Hypertension    Morbid obesity (CMS/[facility])    Renal failure    Anemia in stage 4 chronic kidney disease (CMS/[facility])    NATALIO (acute kidney injury) (CMS/[facility])    CKD (chronic kidney disease) stage 4, GFR 15-29 ml/min (CMS/HCC)    Diabetes mellitus, type II (CMS/HCC)    History of UTI    History of Clostridium difficile infection    Tinea capitis    History of respiratory failure, since off home oxygen            Impression: STAGE 5 CKD. SHE DOES NOT WANT DIALYSIS RIGHT NOW. EXPLAINED THE POTENTIAL DANGERS OF WAITING TO START RRT TO THE PATIENT.  HYPOKALEMIA. ANEMIA.             Recommendations: SUPPLEMENT K.      Rocky Nova MD  08/05/18  10:09 AM

## 2018-08-05 NOTE — PROGRESS NOTES
Continued Stay Note  [facility]     Patient Name: Joy Bueno  MRN: [id number]  Today's Date: 8/5/2018    Admit Date: 8/2/2018          Discharge Plan     Row Name 08/05/18 1202       Plan    Plan Comments I rescheduled the ambulance for 1400 on Monday 8-6-18 if ready to return to the SNF.              Discharge Codes    No documentation.           Lizz Ennis RN

## 2018-08-05 NOTE — PLAN OF CARE
Problem: Fall Risk (Adult)  Goal: Identify Related Risk Factors and Signs and Symptoms  Outcome: Ongoing (interventions implemented as appropriate)   08/05/18 1600   Fall Risk (Adult)   Related Risk Factors (Fall Risk) fatigue/slow reaction;gait/mobility problems;environment unfamiliar   Signs and Symptoms (Fall Risk) presence of risk factors     Goal: Absence of Fall  Outcome: Ongoing (interventions implemented as appropriate)   08/05/18 1600   Fall Risk (Adult)   Absence of Fall making progress toward outcome       Problem: Patient Care Overview  Goal: Plan of Care Review  Outcome: Ongoing (interventions implemented as appropriate)   08/05/18 1600   Coping/Psychosocial   Plan of Care Reviewed With patient   Plan of Care Review   Progress improving     Goal: Discharge Needs Assessment  Outcome: Ongoing (interventions implemented as appropriate)   08/03/18 0004 08/05/18 1600   Discharge Needs Assessment   Readmission Within the Last 30 Days --  no previous admission in last 30 days   Patient/Family Anticipated Services at Transition --  ;rehabilitation services   Disability   Equipment Currently Used at Home wheelchair;walker, standard;shower chair --      Goal: Interprofessional Rounds/Family Conf  Outcome: Ongoing (interventions implemented as appropriate)      Problem: Skin Injury Risk (Adult)  Goal: Identify Related Risk Factors and Signs and Symptoms  Outcome: Ongoing (interventions implemented as appropriate)   08/05/18 1600   Skin Injury Risk (Adult)   Related Risk Factors (Skin Injury Risk) body weight extremes;mobility impaired;infection     Goal: Skin Health and Integrity  Outcome: Ongoing (interventions implemented as appropriate)   08/05/18 1600   Skin Injury Risk (Adult)   Skin Health and Integrity making progress toward outcome       Problem: Infection, Risk/Actual (Adult)  Goal: Identify Related Risk Factors and Signs and Symptoms  Outcome: Ongoing (interventions implemented as  appropriate)   08/05/18 1600   Infection, Risk/Actual (Adult)   Related Risk Factors (Infection, Risk/Actual) poor personal hygiene   Signs and Symptoms (Infection, Risk/Actual) cultures positive;weakness     Goal: Infection Prevention/Resolution  Outcome: Ongoing (interventions implemented as appropriate)

## 2018-08-05 NOTE — PROGRESS NOTES
[facility] Medicine Services  PROGRESS NOTE    Patient Name: Joy Bueno  : 1950  MRN: [id number]    Date of Admission: 2018  Length of Stay: 3  Primary Care Physician: Provider, No Known    Subjective   Subjective     CC:  Anemia    HPI:    Denies bleeding. No reported blood loss. Notes hemorrhoid discomfort. No dysuria. No dyspnea. No f/c.     Review of Systems    Otherwise ROS is negative except as mentioned in the HPI.    Objective   Objective     Vital Signs:   Temp:  [98.1 °F (36.7 °C)-98.5 °F (36.9 °C)] 98.1 °F (36.7 °C)  Heart Rate:  [70-75] 75  Resp:  [16-20] 18  BP: (123-157)/(62-74) 143/74        Physical Exam:  NAD, alert and oriented, sitting up in bed, eating breakfast  OP clear, MMM  PERRL  Neck supple  No LAD  RRR  Decreased at bases otherwise clear  +BS, ND, NT  SUTTON  No c/c  No rashes  Normal affect    Results Reviewed:  I have personally reviewed current lab, radiology, and data and agree.      Results from last 7 days  Lab Units 1848 18  1858   WBC 10*3/mm3  --   --  11.32*  --  12.19*  --  11.59*   HEMOGLOBIN g/dL 8.0* 8.3* 6.8*  < > 7.2*  < > 8.3*   HEMATOCRIT % 24.1* 25.8* 21.4*  < > 22.7*  < > 25.5*   PLATELETS 10*3/mm3  --   --  230  --  262  --  270   INR  0.98  --   --   --   --   --  0.97   < > = values in this interval not displayed.    Results from last 7 days  Lab Units 18  1858   SODIUM mmol/L 142 144 143 143   POTASSIUM mmol/L 3.3* 3.5 3.5 3.6   CHLORIDE mmol/L 108 107 107 106   CO2 mmol/L 23.0 22.0 24.0 26.0   BUN mg/dL 114* 112* 114* 116*   CREATININE mg/dL 3.87* 4.02* 3.58* 3.82*   GLUCOSE mg/dL 133* 178* 162* 164*   CALCIUM mg/dL 8.0* 7.8* 8.2* 9.2   ALT (SGPT) U/L  --   --   --  11   AST (SGOT) U/L  --   --   --  9     Estimated Creatinine Clearance: 19.3 mL/min (A) (by C-G formula based on SCr of 3.87 mg/dL (H)).  No results  found for: BNP    Microbiology Results Abnormal     Procedure Component Value - Date/Time    Urine Culture - Urine, [[id number]]  (Abnormal)  (Susceptibility) Collected:  08/03/18 1016    Lab Status:  Preliminary result Specimen:  Urine Updated:  08/05/18 0818     Urine Culture >100,000 CFU/mL Klebsiella pneumoniae ESBL (C)     Comment:   Consider infectious disease consult.  Susceptibility results may not correlate to clinical outcomes.       Susceptibility      Klebsiella pneumoniae ESBL     AL (Preliminary)     Ciprofloxacin >2 ug/ml Resistant     Ertapenem <=1 ug/ml Susceptible     Gentamicin <=4 ug/ml Susceptible     Levofloxacin >4 ug/ml Resistant     Meropenem <=1 ug/ml Susceptible     Nitrofurantoin 64 ug/ml Intermediate     Piperacillin + Tazobactam <=16 ug/ml Susceptible     Tetracycline >8 ug/ml Resistant     Tobramycin 8 ug/ml Intermediate     Trimethoprim + Sulfamethoxazole >2/38 ug/ml Resistant                          Imaging Results (last 24 hours)     ** No results found for the last 24 hours. **        Results for orders placed during the hospital encounter of 09/02/17   Adult Transthoracic Echo Complete    Narrative · Left ventricular wall thickness is consistent with mild concentric   hypertrophy.  · Left atrial cavity size is mildly dilated.  · Mild mitral valve regurgitation is present  · calcification of the aortic valve  · Mild tricuspid valve regurgitation is present.          I have reviewed the medications.    Assessment/Plan   Assessment / Plan     Hospital Problem List     * (Principal)Anemia    Overview Addendum 8/28/2017 11:03 AM by Piyush Stokes MD     Iron deficient         Hypertension (Chronic)    Morbid obesity (CMS/HCC) (Chronic)    Renal failure    Overview Addendum 8/24/2017  2:20 AM by Esperanza Villegas APRN     Renal Biopsy 08/10/17: Diabetic neuropathy, mild to moderate atherosclerosis            Anemia in stage 4 chronic kidney disease (CMS/HCC)    NATALIO (acute kidney  injury) (CMS/[facility])    CKD (chronic kidney disease) stage 4, GFR 15-29 ml/min (CMS/[facility])    Diabetes mellitus, type II (CMS/[facility]) (Chronic)    History of UTI    History of Clostridium difficile infection    Tinea capitis    History of respiratory failure, since off home oxygen             Brief Hospital Course to date:  Joy Bueno is a 68 y.o. female here with acute on chronic anemia, and NATALIO/CKD.      Assessment & Plan:  Anemia  --no obvious GI blood loss  --good response to tranfusion  --possibly AOCD, but decent retic count  --monitor  --if drops, consider EGD/Pill cam, per GI  NATALIO/CKD  --likely needs dialysis per NAL, although not immediately  --patient refuses at this time  --monitor  UTI  --ESBL, change to Invanz  DM  HTN  Obesity  -  Monitor hemoglobin, renal function, and urine culture. If stable/stabilizes, back to NH tomorrow.    DVT Prophylaxis:  SCDS only    CODE STATUS:   Code Status and Medical Interventions:   Ordered at: 08/03/18 0159     Level Of Support Discussed With:    Patient     Code Status:    CPR     Medical Interventions (Level of Support Prior to Arrest):    Full       Disposition: I expect the patient to be discharged in 1-2 days.      Electronically signed by Valentino Conway MD, 08/05/18, 8:22 AM.

## 2018-08-06 VITALS
OXYGEN SATURATION: 100 % | HEART RATE: 74 BPM | RESPIRATION RATE: 18 BRPM | WEIGHT: 293 LBS | DIASTOLIC BLOOD PRESSURE: 75 MMHG | HEIGHT: 64 IN | BODY MASS INDEX: 50.02 KG/M2 | SYSTOLIC BLOOD PRESSURE: 140 MMHG | TEMPERATURE: 98.1 F

## 2018-08-06 PROBLEM — N17.9 AKI (ACUTE KIDNEY INJURY) (HCC): Status: RESOLVED | Noted: 2018-08-02 | Resolved: 2018-08-06

## 2018-08-06 LAB
ANION GAP SERPL CALCULATED.3IONS-SCNC: 16 MMOL/L (ref 3–11)
BUN BLD-MCNC: 101 MG/DL (ref 9–23)
BUN/CREAT SERPL: 26.2 (ref 7–25)
CALCIUM SPEC-SCNC: 8.4 MG/DL (ref 8.7–10.4)
CHLORIDE SERPL-SCNC: 109 MMOL/L (ref 99–109)
CO2 SERPL-SCNC: 19 MMOL/L (ref 20–31)
CREAT BLD-MCNC: 3.86 MG/DL (ref 0.6–1.3)
DEPRECATED RDW RBC AUTO: 58.4 FL (ref 37–54)
ERYTHROCYTE [DISTWIDTH] IN BLOOD BY AUTOMATED COUNT: 16.8 % (ref 11.3–14.5)
GFR SERPL CREATININE-BSD FRML MDRD: 14 ML/MIN/1.73
GLUCOSE BLD-MCNC: 157 MG/DL (ref 70–100)
GLUCOSE BLDC GLUCOMTR-MCNC: 180 MG/DL (ref 70–130)
GLUCOSE BLDC GLUCOMTR-MCNC: 227 MG/DL (ref 70–130)
HCT VFR BLD AUTO: 25.3 % (ref 34.5–44)
HGB BLD-MCNC: 8 G/DL (ref 11.5–15.5)
MAGNESIUM SERPL-MCNC: 1.2 MG/DL (ref 1.3–2.7)
MCH RBC QN AUTO: 29.9 PG (ref 27–31)
MCHC RBC AUTO-ENTMCNC: 31.6 G/DL (ref 32–36)
MCV RBC AUTO: 94.4 FL (ref 80–99)
PLATELET # BLD AUTO: 238 10*3/MM3 (ref 150–450)
PMV BLD AUTO: 9.9 FL (ref 6–12)
POTASSIUM BLD-SCNC: 3.7 MMOL/L (ref 3.5–5.5)
RBC # BLD AUTO: 2.68 10*6/MM3 (ref 3.89–5.14)
SODIUM BLD-SCNC: 144 MMOL/L (ref 132–146)
WBC NRBC COR # BLD: 12.62 10*3/MM3 (ref 3.5–10.8)

## 2018-08-06 PROCEDURE — 25010000002 ERTAPENEM PER 500 MG: Performed by: HOSPITALIST

## 2018-08-06 PROCEDURE — 85027 COMPLETE CBC AUTOMATED: CPT | Performed by: HOSPITALIST

## 2018-08-06 PROCEDURE — 82962 GLUCOSE BLOOD TEST: CPT

## 2018-08-06 PROCEDURE — 83735 ASSAY OF MAGNESIUM: CPT | Performed by: INTERNAL MEDICINE

## 2018-08-06 PROCEDURE — 99239 HOSP IP/OBS DSCHRG MGMT >30: CPT | Performed by: HOSPITALIST

## 2018-08-06 PROCEDURE — 25010000002 MAGNESIUM SULFATE IN D5W 1G/100ML (PREMIX) 1-5 GM/100ML-% SOLUTION: Performed by: HOSPITALIST

## 2018-08-06 PROCEDURE — 63510000001 EPOETIN ALFA PER 1000 UNITS: Performed by: INTERNAL MEDICINE

## 2018-08-06 PROCEDURE — 80048 BASIC METABOLIC PNL TOTAL CA: CPT | Performed by: HOSPITALIST

## 2018-08-06 RX ORDER — ERTAPENEM 1 G/1
500 INJECTION, POWDER, LYOPHILIZED, FOR SOLUTION INTRAMUSCULAR; INTRAVENOUS DAILY
Qty: 6 EACH | Refills: 0 | Status: SHIPPED | OUTPATIENT
Start: 2018-08-06 | End: 2018-10-14

## 2018-08-06 RX ORDER — MAGNESIUM SULFATE HEPTAHYDRATE 40 MG/ML
4 INJECTION, SOLUTION INTRAVENOUS AS NEEDED
Status: DISCONTINUED | OUTPATIENT
Start: 2018-08-06 | End: 2018-08-06 | Stop reason: HOSPADM

## 2018-08-06 RX ORDER — ERTAPENEM 1 G/1
500 INJECTION, POWDER, LYOPHILIZED, FOR SOLUTION INTRAMUSCULAR; INTRAVENOUS DAILY
Qty: 6 EACH | Refills: 0
Start: 2018-08-06 | End: 2018-08-06

## 2018-08-06 RX ORDER — FUROSEMIDE 40 MG/1
40 TABLET ORAL DAILY PRN
Start: 2018-08-06 | End: 2018-10-23 | Stop reason: HOSPADM

## 2018-08-06 RX ORDER — MAGNESIUM SULFATE HEPTAHYDRATE 40 MG/ML
2 INJECTION, SOLUTION INTRAVENOUS AS NEEDED
Status: DISCONTINUED | OUTPATIENT
Start: 2018-08-06 | End: 2018-08-06 | Stop reason: HOSPADM

## 2018-08-06 RX ORDER — AMLODIPINE BESYLATE 5 MG/1
5 TABLET ORAL DAILY
Start: 2018-08-07 | End: 2018-10-14

## 2018-08-06 RX ORDER — MAGNESIUM SULFATE 1 G/100ML
1 INJECTION INTRAVENOUS AS NEEDED
Status: DISCONTINUED | OUTPATIENT
Start: 2018-08-06 | End: 2018-08-06 | Stop reason: HOSPADM

## 2018-08-06 RX ADMIN — MAGNESIUM SULFATE HEPTAHYDRATE 1 G: 1 INJECTION, SOLUTION INTRAVENOUS at 13:07

## 2018-08-06 RX ADMIN — PANTOPRAZOLE SODIUM 40 MG: 40 TABLET, DELAYED RELEASE ORAL at 05:11

## 2018-08-06 RX ADMIN — CARVEDILOL 25 MG: 12.5 TABLET, FILM COATED ORAL at 09:45

## 2018-08-06 RX ADMIN — HYDROCORTISONE: 10 CREAM TOPICAL at 09:47

## 2018-08-06 RX ADMIN — OXYCODONE HYDROCHLORIDE AND ACETAMINOPHEN 250 MG: 500 TABLET ORAL at 09:45

## 2018-08-06 RX ADMIN — INSULIN LISPRO 3 UNITS: 100 INJECTION, SOLUTION INTRAVENOUS; SUBCUTANEOUS at 13:08

## 2018-08-06 RX ADMIN — AMLODIPINE BESYLATE 5 MG: 5 TABLET ORAL at 09:46

## 2018-08-06 RX ADMIN — CETIRIZINE HYDROCHLORIDE 10 MG: 10 TABLET, FILM COATED ORAL at 09:45

## 2018-08-06 RX ADMIN — SERTRALINE HYDROCHLORIDE 50 MG: 50 TABLET ORAL at 09:46

## 2018-08-06 RX ADMIN — EPOETIN ALFA 20000 UNITS: 20000 SOLUTION INTRAVENOUS; SUBCUTANEOUS at 10:38

## 2018-08-06 RX ADMIN — CALCITRIOL 0.25 MCG: 0.25 CAPSULE ORAL at 09:47

## 2018-08-06 RX ADMIN — INSULIN LISPRO 2 UNITS: 100 INJECTION, SOLUTION INTRAVENOUS; SUBCUTANEOUS at 09:47

## 2018-08-06 RX ADMIN — HYDRALAZINE HYDROCHLORIDE 25 MG: 25 TABLET ORAL at 13:08

## 2018-08-06 RX ADMIN — Medication 250 MG: at 09:46

## 2018-08-06 RX ADMIN — Medication 325 MG: at 09:45

## 2018-08-06 RX ADMIN — ISOSORBIDE MONONITRATE 30 MG: 30 TABLET, EXTENDED RELEASE ORAL at 09:45

## 2018-08-06 RX ADMIN — SODIUM CHLORIDE 500 MG: 9 INJECTION, SOLUTION INTRAVENOUS at 10:38

## 2018-08-06 RX ADMIN — HYDRALAZINE HYDROCHLORIDE 25 MG: 25 TABLET ORAL at 05:11

## 2018-08-06 NOTE — PROGRESS NOTES
"   LOS: 4 days    Patient Care Team:  Provider, No Known as PCP - General    Reason For Visit:  F/U STAGE 5 CKD  Subjective           Review of Systems:    Pulm: No soa   CV:  No CP      Objective       amLODIPine 5 mg Oral Daily   atorvastatin 10 mg Oral Nightly   calcitriol 0.25 mcg Oral Daily   carvedilol 25 mg Oral BID With Meals   cetirizine 10 mg Oral Daily   epoetin porter 20,000 Units Subcutaneous Once per day on Mon Wed Fri   ertapenem 500 mg Intravenous Q24H   ferrous sulfate 325 mg Oral Daily   hydrALAZINE 25 mg Oral Q8H   hydrocortisone  Topical Q12H   insulin lispro 0-7 Units Subcutaneous 4x Daily With Meals & Nightly   isosorbide mononitrate 30 mg Oral Daily   pantoprazole 40 mg Oral Q AM   risperiDONE 0.125 mg Oral Nightly   saccharomyces boulardii 250 mg Oral BID   selenium sulfide  Topical Once per day on Mon Thu   sertraline 50 mg Oral Daily   vitamin C 250 mg Oral Daily            Vital Signs:  Blood pressure 140/75, pulse 74, temperature 98.1 °F (36.7 °C), temperature source Oral, resp. rate 18, height 162.6 cm (64\"), weight 135 kg (296 lb 15.4 oz), SpO2 100 %.    Flowsheet Rows      First Filed Value   Admission Height  162.6 cm (64\") Documented at 08/02/2018 1749   Admission Weight  124 kg (273 lb) Documented at 08/02/2018 1744          08/05 0701 - 08/06 0700  In: 360 [P.O.:360]  Out: 1550 [Urine:1550]    Physical Exam:    General Appearance: NAD, alert and cooperative, Ox3  Eyes: PER, conjunctivae and sclerae normal, no icterus  Lungs: respirations regular and unlabored, no crepitus, clear to auscultation  Heart/CV: regular rhythm & normal rate, no murmur, no gallop, no rub and no edema  Abdomen: not distended, soft, non-tender, no masses,  bowel sounds present. OBESE  Skin: No rash, Warm and dry    Radiology:            Labs:    Results from last 7 days  Lab Units 08/06/18  0637 08/05/18  0507 08/04/18  0948 08/04/18  0210  08/03/18  0417   WBC 10*3/mm3 12.62*  --   --  11.32*  --  12.19* "   HEMOGLOBIN g/dL 8.0* 8.0* 8.3* 6.8*  < > 7.2*   HEMATOCRIT % 25.3* 24.1* 25.8* 21.4*  < > 22.7*   PLATELETS 10*3/mm3 238  --   --  230  --  262   < > = values in this interval not displayed.    Results from last 7 days  Lab Units 08/06/18  0637 08/05/18  0507 08/04/18  0210 08/03/18  0417 08/02/18  1858   SODIUM mmol/L 144 142 144 143 143   POTASSIUM mmol/L 3.7 3.3* 3.5 3.5 3.6   CHLORIDE mmol/L 109 108 107 107 106   CO2 mmol/L 19.0* 23.0 22.0 24.0 26.0   BUN mg/dL 101* 114* 112* 114* 116*   CREATININE mg/dL 3.86* 3.87* 4.02* 3.58* 3.82*   CALCIUM mg/dL 8.4* 8.0* 7.8* 8.2* 9.2   PHOSPHORUS mg/dL  --  7.2* 6.7*  --   --    MAGNESIUM mg/dL 1.2*  --   --   --   --    ALBUMIN g/dL  --  3.27 3.05*  --  3.83       Results from last 7 days  Lab Units 08/06/18  0637   GLUCOSE mg/dL 157*         Results from last 7 days  Lab Units 08/02/18  1858   ALK PHOS U/L 110*   BILIRUBIN mg/dL 0.3   ALT (SGPT) U/L 11   AST (SGOT) U/L 9             Results from last 7 days  Lab Units 08/03/18  1016   COLOR UA  Yellow   CLARITY UA  Turbid*   PH, URINE  6.0   SPECIFIC GRAVITY, URINE  1.011   GLUCOSE UA  Negative   KETONES UA  Negative   BILIRUBIN UA  Negative   PROTEIN UA  >=300 mg/dL (3+)*   BLOOD UA  Trace*   LEUKOCYTES UA  Large (3+)*   NITRITE UA  Negative       Estimated Creatinine Clearance: 19.4 mL/min (A) (by C-G formula based on SCr of 3.86 mg/dL (H)).      Assessment     Principal Problem:    Anemia  Active Problems:    Hypertension    Morbid obesity (CMS/[facility])    Renal failure    UTI (urinary tract infection)    Anemia in stage 4 chronic kidney disease (CMS/[facility])    CKD (chronic kidney disease) stage 4, GFR 15-29 ml/min (CMS/HCC)    Diabetes mellitus, type II (CMS/[facility])    History of UTI    History of Clostridium difficile infection    Tinea capitis    History of respiratory failure, since off home oxygen            Impression: STAGE 5 CKD. STABLE GFR. BX-PROVEN DM NEPHROPATHY. ANEMIA IS STABLE. PATIENT NOT SURE IF SHE WANTS TO  PURSUE DIALYSIS.            Recommendations: TO NH TODAY. F/U WITH [facility] ARRANGED.       Rocky Nova MD  08/06/18  1:13 PM

## 2018-08-06 NOTE — DISCHARGE PLACEMENT REQUEST
"Pam Bueno  (68 y.o. Female)     IM med order (script sent w patient):  ertapenem 500 mg IM    500 mg, IM Every 24 Hours, for 6 days            Date of Birth Social Security Number Address Home Phone MRN    1950  [address] [address] [phone] [id number]    Yarsani Marital Status          None        Admission Date Admission Type Admitting Provider Attending Provider Department, Room/Bed    18 Emergency Valentino Conway MD Russell, Marc P, MD [facility] 5G, S553/1    Discharge Date Discharge Disposition Discharge Destination         Skilled Nursing Facility (DC - External)              Attending Provider:  Valentino Conway MD    Allergies:  Geodon [Ziprasidone Hcl], Haldol [Haloperidol Lactate], Seroquel [Quetiapine Fumarate]    Isolation:  Contact   Infection:  ESBL Klebsiella (18)   Code Status:  CPR    Ht:  162.6 cm (64\")   Wt:  135 kg (296 lb 15.4 oz)    Admission Cmt:  None   Principal Problem:  Anemia [D64.9] More...                 Active Insurance as of 2018     Primary Coverage     Payor Plan Insurance Group Employer/Plan Group    MEDICARE MEDICARE A & B      Payor Plan Address Payor Plan Phone Number Effective From Effective To    [address] [phone] 2016     [facility] [address]       Subscriber Name Subscriber Birth Date Member ID       PAM BUENO 1950 [id number]                 Emergency Contacts      (Rel.) Home Phone Work Phone Mobile Phone    Tessie Dorado (Daughter) [phone] --            Insurance Information                MEDICARE/MEDICARE A & B Phone: [phone]    Subscriber: Myles Pam Subscriber#: [id number]    Group#:  Precert#:                Discharge Summary      Valentino Conway MD at 2018 12:23 PM              [facility]  DISCHARGE SUMMARY    Patient Name: Pam Bueno  : 1950  MRN: " [id number]    Date of Admission: 8/2/2018  Date of Discharge:  8/6/2018  Primary Care Physician: Provider, No Known    Consults     Date and Time Order Name Status Description    8/3/2018 0159 Inpatient Gastroenterology Consult Completed     8/3/2018 0159 Inpatient Nephrology Consult Completed         Hospital Course     Presenting Problem:   Anemia in stage 4 chronic kidney disease (CMS/[facility]) [N18.4, D63.1]    Active Hospital Problems    Diagnosis Date Noted   • **Anemia [D64.9] 08/23/2017   • CKD (chronic kidney disease) stage 4, GFR 15-29 ml/min (CMS/[facility]) [N18.4] 08/02/2018     Priority: High   • UTI (urinary tract infection) [N39.0] 10/04/2017     Priority: High   • Diabetes mellitus, type II (CMS/[facility]) [E11.9] 08/03/2018   • History of UTI [Z87.440] 08/03/2018   • History of Clostridium difficile infection [Z86.19] 08/03/2018   • Tinea capitis [B35.0] 08/03/2018   • History of respiratory failure, since off home oxygen [Z87.09] 08/03/2018   • Anemia in stage 4 chronic kidney disease (CMS/[facility]) [N18.4, D63.1] 08/02/2018   • Renal failure [N19] 08/03/2017   • Hypertension [I10] 08/03/2017   • Morbid obesity (CMS/[facility]) [E66.01]       Resolved Hospital Problems    Diagnosis Date Noted Date Resolved   • NATALIO (acute kidney injury) (CMS/[facility]) [N17.9] 08/02/2018 08/06/2018          Hospital Course:    Joy Bueno is a 68 year old resident of [facility] that was admitted for abnormal labs. She was found to be increasingly anemic and her renal function has declined. She presented with an elevated BUN and concerns for a GI bleed were raised, however stool occult blood testing was negative. She was evaluated by GI and they recommended observation for now. An EGD in the past revealed gastritis and a colonoscopy in the past revealed scattered diverticular disease.  She was transfused and her hemoglobin remained relatively stable. She declines further endoscopy study at this time, but per GI she may benefit from repeat  future EGD with capsule endoscopy placement at that time. She did receive a dose of procrit. Her iron studies and reticulocyte count are seemingly adequate despite her anemia. At this time no definitive cause of her anemia was found, and we will arrange outpatient GI follow up.    The patient was found to have acute kidney disease superimposed on chronic kidney disease. She was followed by nephrology associates and was felt to have progression of her chronic kidney disease and felt that she would likely require dialysis in the near future. She declines dialysis at this time but will follow up with [facility] in 2-3 weeks.    The patient was found to have ESBL Klebsiella urinary tract infection on admission. She continues to produce urine in the setting of her CKD. She is on Invanz and will need to complete a course of Invanz, IM as written.    The patient has chronic DHF. She is seemingly compensated at this time. We have revised her diuretic regimen. She is otherwise on an appropriate regimen for congestive heart failure.              Day of Discharge     HPI:   She denies dyspnea. She denies pain. She denies shortness of breath. No n/v. She is tolerating PO. She denies dysuria. No diarrhea. No bleeding.    Review of Systems    Otherwise ROS is negative except as mentioned in the HPI.    Vital Signs:   Temp:  [98.1 °F (36.7 °C)] 98.1 °F (36.7 °C)  Heart Rate:  [67-78] 74  Resp:  [18] 18  BP: (129-153)/(59-85) 140/75     Physical Exam:  NAD, alert and oriented, sitting up in bed, eating breakfast  OP clear, MMM  PERRL  Neck supple  No LAD  RRR  Decreased at bases otherwise clear  +BS, ND, NT  SUTTON  No c/c  No rashes  Normal affect    Pertinent  and/or Most Recent Results       Results from last 7 days  Lab Units 08/06/18  0637 08/05/18  0507 08/04/18  0948 08/04/18  0210 08/03/18  2212 08/03/18  1422 08/03/18  0417  08/02/18  1858   WBC 10*3/mm3 12.62*  --   --  11.32*  --   --  12.19*  --  11.59*   HEMOGLOBIN g/dL 8.0*  8.0* 8.3* 6.8* 6.9* 7.2* 7.2*  < > 8.3*   HEMATOCRIT % 25.3* 24.1* 25.8* 21.4* 21.9* 23.0* 22.7*  < > 25.5*   PLATELETS 10*3/mm3 238  --   --  230  --   --  262  --  270   SODIUM mmol/L 144 142  --  144  --   --  143  --  143   POTASSIUM mmol/L 3.7 3.3*  --  3.5  --   --  3.5  --  3.6   CHLORIDE mmol/L 109 108  --  107  --   --  107  --  106   CO2 mmol/L 19.0* 23.0  --  22.0  --   --  24.0  --  26.0   BUN mg/dL 101* 114*  --  112*  --   --  114*  --  116*   CREATININE mg/dL 3.86* 3.87*  --  4.02*  --   --  3.58*  --  3.82*   GLUCOSE mg/dL 157* 133*  --  178*  --   --  162*  --  164*   CALCIUM mg/dL 8.4* 8.0*  --  7.8*  --   --  8.2*  --  9.2   < > = values in this interval not displayed.    Results from last 7 days  Lab Units 08/05/18  0507 08/02/18  1858   BILIRUBIN mg/dL  --  0.3   ALK PHOS U/L  --  110*   ALT (SGPT) U/L  --  11   AST (SGOT) U/L  --  9   PROTIME Seconds 10.3 10.2   INR  0.98 0.97   APTT seconds  --  27.5           Invalid input(s): TG, LDLCALC, LDLREALC    Results from last 7 days  Lab Units 08/03/18  0417   HEMOGLOBIN A1C % 6.80*     Brief Urine Lab Results  (Last result in the past 365 days)      Color   Clarity   Blood   Leuk Est   Nitrite   Protein   CREAT   Urine HCG        08/03/18 1016 Yellow Turbid(A) Trace(A) Large (3+)(A) Negative >=300 mg/dL (3+)(A)               Microbiology Results Abnormal     Procedure Component Value - Date/Time    Urine Culture - Urine, [[id number]]  (Abnormal)  (Susceptibility) Collected:  08/03/18 1016    Lab Status:  Final result Specimen:  Urine Updated:  08/05/18 1411     Urine Culture >100,000 CFU/mL Klebsiella pneumoniae ESBL (C)     Comment:   Consider infectious disease consult.  Susceptibility results may not correlate to clinical outcomes.       Susceptibility      Klebsiella pneumoniae ESBL     AL     Ciprofloxacin >2 ug/ml Resistant     Ertapenem <=1 ug/ml Susceptible     Gentamicin <=4 ug/ml Susceptible     Levofloxacin >4 ug/ml Resistant      Meropenem <=1 ug/ml Susceptible     Nitrofurantoin 64 ug/ml Intermediate     Piperacillin + Tazobactam <=16 ug/ml Susceptible     Tetracycline >8 ug/ml Resistant     Tobramycin 8 ug/ml Intermediate     Trimethoprim + Sulfamethoxazole >2/38 ug/ml Resistant                          Imaging Results (all)     None          Results for orders placed during the hospital encounter of 10/04/17   Duplex Venous Lower Extremity - Bilateral CAR    Narrative · Normal bilateral lower extremity venous duplex scan.          Results for orders placed during the hospital encounter of 10/04/17   Duplex Venous Lower Extremity - Bilateral CAR    Narrative · Normal bilateral lower extremity venous duplex scan.          Results for orders placed during the hospital encounter of 09/02/17   Adult Transthoracic Echo Complete    Narrative · Left ventricular wall thickness is consistent with mild concentric   hypertrophy.  · Left atrial cavity size is mildly dilated.  · Mild mitral valve regurgitation is present  · calcification of the aortic valve  · Mild tricuspid valve regurgitation is present.            Discharge Details        Discharge Medications      New Medications      Instructions Start Date   ertapenem 500 mg IM   500 mg, IM Every 24 Hours, for 6 days      insulin lispro 100 UNIT/ML injection  Commonly known as:  humaLOG   0-7 Units, Subcutaneous, 4 Times Daily With Meals & Nightly         Changes to Medications      Instructions Start Date   amLODIPine 5 MG tablet  Commonly known as:  NORVASC  What changed:  · medication strength  · how much to take   5 mg, Oral, Daily      furosemide 40 MG tablet  Commonly known as:  LASIX  What changed:  · when to take this  · reasons to take this   40 mg, Oral, Daily PRN      hydrALAZINE 100 MG tablet  Commonly known as:  APRESOLINE  What changed:  Another medication with the same name was removed. Continue taking this medication, and follow the directions you see here.   100 mg, Oral, 3 Times  Daily      isosorbide mononitrate 30 MG 24 hr tablet  Commonly known as:  IMDUR  What changed:  Another medication with the same name was removed. Continue taking this medication, and follow the directions you see here.   30 mg, Oral, Daily         Continue These Medications      Instructions Start Date   acetaminophen 325 MG tablet  Commonly known as:  TYLENOL   650 mg, Oral, Every 4 Hours PRN      albuterol 108 (90 Base) MCG/ACT inhaler  Commonly known as:  PROVENTIL HFA;VENTOLIN HFA   2 puffs, Inhalation, Every 4 Hours PRN      atorvastatin 10 MG tablet  Commonly known as:  LIPITOR   10 mg, Oral, Nightly      calcitriol 0.25 MCG capsule  Commonly known as:  ROCALTROL   0.25 mcg, Oral, Daily      carvedilol 25 MG tablet  Commonly known as:  COREG   25 mg, Oral, 2 Times Daily With Meals      castor oil-balsam peru ointment   1 application, Topical, Every 12 Hours Scheduled      ferrous sulfate 325 (65 FE) MG tablet   325 mg, Oral, Daily      loratadine 10 MG tablet  Commonly known as:  CLARITIN   10 mg, Oral, Daily      melatonin 1 MG tablet   3 mg, Oral, Nightly      miconazole 2 % powder  Commonly known as:  MICOTIN   Topical, Every 12 Hours Scheduled      mometasone-formoterol 200-5 MCG/ACT inhaler  Commonly known as:  DULERA 200   2 puffs, Inhalation, 2 Times Daily - RT      MULTIVITAMIN ADULTS PO   1 tablet, Oral, Daily      NITROGLYCERIN SL   0.4 mg, Sublingual, PRN CHEST PAIN       pantoprazole 40 MG EC tablet  Commonly known as:  PROTONIX   40 mg, Oral, Daily      phenylephrine-shark liver oil-mineral oil-petrolatum 0.25-3-14-71.9 % rectal ointment  Commonly known as:  PREPARATION H   Rectal, 3 Times Daily PRN      polyethylene glycol packet  Commonly known as:  MIRALAX   17 g, Oral, Daily      potassium chloride 20 MEQ CR tablet  Commonly known as:  K-DUR,KLOR-CON   20 mEq, Oral, Daily      promethazine 25 MG tablet  Commonly known as:  PHENERGAN   25 mg, Oral, Every 6 Hours PRN      risperiDONE 0.25 MG  tablet  Commonly known as:  risperDAL   0.125 mg, Oral, Every Night at Bedtime      saccharomyces boulardii 250 MG capsule  Commonly known as:  FLORASTOR   250 mg, Oral, 2 Times Daily      sertraline 50 MG tablet  Commonly known as:  ZOLOFT   50 mg, Oral, Daily      simethicone 80 MG chewable tablet  Commonly known as:  MYLICON   80 mg, Oral, Every 6 Hours PRN      vitamin C 250 MG tablet  Commonly known as:  ASCORBIC ACID   250 mg, Oral, Daily      Vitamin D3 17198 units tablet   50,000 Units, Oral, Every 7 Days         Stop These Medications    bumetanide 2 MG tablet  Commonly known as:  BUMEX     clonazePAM 0.5 MG tablet  Commonly known as:  KlonoPIN     escitalopram 10 MG tablet  Commonly known as:  LEXAPRO     glipiZIDE 5 MG ER tablet  Commonly known as:  GLUCOTROL XL     HYDROcodone-acetaminophen 5-325 MG per tablet  Commonly known as:  NORCO     metOLazone 5 MG tablet  Commonly known as:  ZAROXOLYN              Discharge Disposition:  Skilled Nursing Facility (DC - External)    Discharge Diet:  Diet Instructions     Diet: Consistent Carbohydrate       Discharge Diet:  Consistent Carbohydrate          Discharge Activity:   Activity Instructions     Activity as Tolerated       Up in chair daily, ambulate as tolerated daily            Code Status/Level of Support:  Code Status and Medical Interventions:   Ordered at: 08/03/18 0159     Level Of Support Discussed With:    Patient     Code Status:    CPR     Medical Interventions (Level of Support Prior to Arrest):    Full       Future Appointments  Date Time Provider Department Center   9/5/2018 10:30 AM Dilip Krause MD [facility] None       Additional Instructions for the Follow-ups that You Need to Schedule     Discharge Follow-up with PCP    As directed      Currently Documented PCP:  Provider, No Known  PCP Phone Number:  None    Follow Up Details:  2 weeks         Discharge Follow-up with Specified Provider: [facility] in 3-4 weeks    As  directed      To:  [facility] in 3-4 weeks         Discharge Follow-up with Specified Provider: VANE Krause in 4 weeks, may benefit from EGD    As directed      To:  VANE Krause in 4 weeks, may benefit from EGD               Time Spent on Discharge:  40 minutes    Electronically signed by Valentino Conway MD, 08/06/18, 12:23 PM.        Electronically signed by Valentino Conway MD at 8/6/2018  1:17 PM

## 2018-08-06 NOTE — DISCHARGE SUMMARY
[facility] Medicine Services  DISCHARGE SUMMARY    Patient Name: Joy Bueno  : 1950  MRN: [id number]    Date of Admission: 2018  Date of Discharge:  2018  Primary Care Physician: Provider, No Known    Consults     Date and Time Order Name Status Description    8/3/2018 0159 Inpatient Gastroenterology Consult Completed     8/3/2018 0159 Inpatient Nephrology Consult Completed         Hospital Course     Presenting Problem:   Anemia in stage 4 chronic kidney disease (CMS/[facility]) [N18.4, D63.1]    Active Hospital Problems    Diagnosis Date Noted   • **Anemia [D64.9] 2017   • CKD (chronic kidney disease) stage 4, GFR 15-29 ml/min (CMS/[facility]) [N18.4] 2018     Priority: High   • UTI (urinary tract infection) [N39.0] 10/04/2017     Priority: High   • Diabetes mellitus, type II (CMS/[facility]) [E11.9] 2018   • History of UTI [Z87.440] 2018   • History of Clostridium difficile infection [Z86.19] 2018   • Tinea capitis [B35.0] 2018   • History of respiratory failure, since off home oxygen [Z87.09] 2018   • Anemia in stage 4 chronic kidney disease (CMS/HCC) [N18.4, D63.1] 2018   • Renal failure [N19] 2017   • Hypertension [I10] 2017   • Morbid obesity (CMS/[facility]) [E66.01]       Resolved Hospital Problems    Diagnosis Date Noted Date Resolved   • NATALIO (acute kidney injury) (CMS/[facility]) [N17.9] 2018          Hospital Course:    Joy Bueno is a 68 year old resident of [facility] that was admitted for abnormal labs. She was found to be increasingly anemic and her renal function has declined. She presented with an elevated BUN and concerns for a GI bleed were raised, however stool occult blood testing was negative. She was evaluated by GI and they recommended observation for now. An EGD in the past revealed gastritis and a colonoscopy in the past revealed scattered diverticular disease.  She was transfused and her  hemoglobin remained relatively stable. She declines further endoscopy study at this time, but per GI she may benefit from repeat future EGD with capsule endoscopy placement at that time. She did receive a dose of procrit. Her iron studies and reticulocyte count are seemingly adequate despite her anemia. At this time no definitive cause of her anemia was found, and we will arrange outpatient GI follow up.    The patient was found to have acute kidney disease superimposed on chronic kidney disease. She was followed by [facility]rology associates and was felt to have progression of her chronic kidney disease and felt that she would likely require dialysis in the near future. She declines dialysis at this time but will follow up with [facility] in 2-3 weeks.    The patient was found to have ESBL Klebsiella urinary tract infection on admission. She continues to produce urine in the setting of her CKD. She is on Invanz and will need to complete a course of Invanz, IM as written.    The patient has chronic DHF. She is seemingly compensated at this time. We have revised her diuretic regimen. She is otherwise on an appropriate regimen for congestive heart failure.              Day of Discharge     HPI:   She denies dyspnea. She denies pain. She denies shortness of breath. No n/v. She is tolerating PO. She denies dysuria. No diarrhea. No bleeding.    Review of Systems    Otherwise ROS is negative except as mentioned in the HPI.    Vital Signs:   Temp:  [98.1 °F (36.7 °C)] 98.1 °F (36.7 °C)  Heart Rate:  [67-78] 74  Resp:  [18] 18  BP: (129-153)/(59-85) 140/75     Physical Exam:  NAD, alert and oriented, sitting up in bed, eating breakfast  OP clear, MMM  PERRL  Neck supple  No LAD  RRR  Decreased at bases otherwise clear  +BS, ND, NT  SUTTON  No c/c  No rashes  Normal affect    Pertinent  and/or Most Recent Results       Results from last 7 days  Lab Units 08/06/18  0637 08/05/18  0507 08/04/18  0948 08/04/18  0210 08/03/18  9242  08/03/18  1422 08/03/18  0417  08/02/18  1858   WBC 10*3/mm3 12.62*  --   --  11.32*  --   --  12.19*  --  11.59*   HEMOGLOBIN g/dL 8.0* 8.0* 8.3* 6.8* 6.9* 7.2* 7.2*  < > 8.3*   HEMATOCRIT % 25.3* 24.1* 25.8* 21.4* 21.9* 23.0* 22.7*  < > 25.5*   PLATELETS 10*3/mm3 238  --   --  230  --   --  262  --  270   SODIUM mmol/L 144 142  --  144  --   --  143  --  143   POTASSIUM mmol/L 3.7 3.3*  --  3.5  --   --  3.5  --  3.6   CHLORIDE mmol/L 109 108  --  107  --   --  107  --  106   CO2 mmol/L 19.0* 23.0  --  22.0  --   --  24.0  --  26.0   BUN mg/dL 101* 114*  --  112*  --   --  114*  --  116*   CREATININE mg/dL 3.86* 3.87*  --  4.02*  --   --  3.58*  --  3.82*   GLUCOSE mg/dL 157* 133*  --  178*  --   --  162*  --  164*   CALCIUM mg/dL 8.4* 8.0*  --  7.8*  --   --  8.2*  --  9.2   < > = values in this interval not displayed.    Results from last 7 days  Lab Units 08/05/18  0507 08/02/18  1858   BILIRUBIN mg/dL  --  0.3   ALK PHOS U/L  --  110*   ALT (SGPT) U/L  --  11   AST (SGOT) U/L  --  9   PROTIME Seconds 10.3 10.2   INR  0.98 0.97   APTT seconds  --  27.5           Invalid input(s): TG, LDLCALC, LDLREALC    Results from last 7 days  Lab Units 08/03/18  0417   HEMOGLOBIN A1C % 6.80*     Brief Urine Lab Results  (Last result in the past 365 days)      Color   Clarity   Blood   Leuk Est   Nitrite   Protein   CREAT   Urine HCG        08/03/18 1016 Yellow Turbid(A) Trace(A) Large (3+)(A) Negative >=300 mg/dL (3+)(A)               Microbiology Results Abnormal     Procedure Component Value - Date/Time    Urine Culture - Urine, [[id number]]  (Abnormal)  (Susceptibility) Collected:  08/03/18 1016    Lab Status:  Final result Specimen:  Urine Updated:  08/05/18 1411     Urine Culture >100,000 CFU/mL Klebsiella pneumoniae ESBL (C)     Comment:   Consider infectious disease consult.  Susceptibility results may not correlate to clinical outcomes.       Susceptibility      Klebsiella pneumoniae ESBL     AL     Ciprofloxacin >2  ug/ml Resistant     Ertapenem <=1 ug/ml Susceptible     Gentamicin <=4 ug/ml Susceptible     Levofloxacin >4 ug/ml Resistant     Meropenem <=1 ug/ml Susceptible     Nitrofurantoin 64 ug/ml Intermediate     Piperacillin + Tazobactam <=16 ug/ml Susceptible     Tetracycline >8 ug/ml Resistant     Tobramycin 8 ug/ml Intermediate     Trimethoprim + Sulfamethoxazole >2/38 ug/ml Resistant                          Imaging Results (all)     None          Results for orders placed during the hospital encounter of 10/04/17   Duplex Venous Lower Extremity - Bilateral CAR    Narrative · Normal bilateral lower extremity venous duplex scan.          Results for orders placed during the hospital encounter of 10/04/17   Duplex Venous Lower Extremity - Bilateral CAR    Narrative · Normal bilateral lower extremity venous duplex scan.          Results for orders placed during the hospital encounter of 09/02/17   Adult Transthoracic Echo Complete    Narrative · Left ventricular wall thickness is consistent with mild concentric   hypertrophy.  · Left atrial cavity size is mildly dilated.  · Mild mitral valve regurgitation is present  · calcification of the aortic valve  · Mild tricuspid valve regurgitation is present.            Discharge Details        Discharge Medications      New Medications      Instructions Start Date   ertapenem 500 mg IM   500 mg, IM Every 24 Hours, for 6 days      insulin lispro 100 UNIT/ML injection  Commonly known as:  humaLOG   0-7 Units, Subcutaneous, 4 Times Daily With Meals & Nightly         Changes to Medications      Instructions Start Date   amLODIPine 5 MG tablet  Commonly known as:  NORVASC  What changed:  · medication strength  · how much to take   5 mg, Oral, Daily      furosemide 40 MG tablet  Commonly known as:  LASIX  What changed:  · when to take this  · reasons to take this   40 mg, Oral, Daily PRN      hydrALAZINE 100 MG tablet  Commonly known as:  APRESOLINE  What changed:  Another medication  with the same name was removed. Continue taking this medication, and follow the directions you see here.   100 mg, Oral, 3 Times Daily      isosorbide mononitrate 30 MG 24 hr tablet  Commonly known as:  IMDUR  What changed:  Another medication with the same name was removed. Continue taking this medication, and follow the directions you see here.   30 mg, Oral, Daily         Continue These Medications      Instructions Start Date   acetaminophen 325 MG tablet  Commonly known as:  TYLENOL   650 mg, Oral, Every 4 Hours PRN      albuterol 108 (90 Base) MCG/ACT inhaler  Commonly known as:  PROVENTIL HFA;VENTOLIN HFA   2 puffs, Inhalation, Every 4 Hours PRN      atorvastatin 10 MG tablet  Commonly known as:  LIPITOR   10 mg, Oral, Nightly      calcitriol 0.25 MCG capsule  Commonly known as:  ROCALTROL   0.25 mcg, Oral, Daily      carvedilol 25 MG tablet  Commonly known as:  COREG   25 mg, Oral, 2 Times Daily With Meals      castor oil-balsam peru ointment   1 application, Topical, Every 12 Hours Scheduled      ferrous sulfate 325 (65 FE) MG tablet   325 mg, Oral, Daily      loratadine 10 MG tablet  Commonly known as:  CLARITIN   10 mg, Oral, Daily      melatonin 1 MG tablet   3 mg, Oral, Nightly      miconazole 2 % powder  Commonly known as:  MICOTIN   Topical, Every 12 Hours Scheduled      mometasone-formoterol 200-5 MCG/ACT inhaler  Commonly known as:  DULERA 200   2 puffs, Inhalation, 2 Times Daily - RT      MULTIVITAMIN ADULTS PO   1 tablet, Oral, Daily      NITROGLYCERIN SL   0.4 mg, Sublingual, PRN CHEST PAIN       pantoprazole 40 MG EC tablet  Commonly known as:  PROTONIX   40 mg, Oral, Daily      phenylephrine-shark liver oil-mineral oil-petrolatum 0.25-3-14-71.9 % rectal ointment  Commonly known as:  PREPARATION H   Rectal, 3 Times Daily PRN      polyethylene glycol packet  Commonly known as:  MIRALAX   17 g, Oral, Daily      potassium chloride 20 MEQ CR tablet  Commonly known as:  K-DUR,KLOR-CON   20 mEq, Oral,  Daily      promethazine 25 MG tablet  Commonly known as:  PHENERGAN   25 mg, Oral, Every 6 Hours PRN      risperiDONE 0.25 MG tablet  Commonly known as:  risperDAL   0.125 mg, Oral, Every Night at Bedtime      saccharomyces boulardii 250 MG capsule  Commonly known as:  FLORASTOR   250 mg, Oral, 2 Times Daily      sertraline 50 MG tablet  Commonly known as:  ZOLOFT   50 mg, Oral, Daily      simethicone 80 MG chewable tablet  Commonly known as:  MYLICON   80 mg, Oral, Every 6 Hours PRN      vitamin C 250 MG tablet  Commonly known as:  ASCORBIC ACID   250 mg, Oral, Daily      Vitamin D3 13945 units tablet   50,000 Units, Oral, Every 7 Days         Stop These Medications    bumetanide 2 MG tablet  Commonly known as:  BUMEX     clonazePAM 0.5 MG tablet  Commonly known as:  KlonoPIN     escitalopram 10 MG tablet  Commonly known as:  LEXAPRO     glipiZIDE 5 MG ER tablet  Commonly known as:  GLUCOTROL XL     HYDROcodone-acetaminophen 5-325 MG per tablet  Commonly known as:  NORCO     metOLazone 5 MG tablet  Commonly known as:  ZAROXOLYN              Discharge Disposition:  Skilled Nursing Facility (DC - External)    Discharge Diet:  Diet Instructions     Diet: Consistent Carbohydrate       Discharge Diet:  Consistent Carbohydrate          Discharge Activity:   Activity Instructions     Activity as Tolerated       Up in chair daily, ambulate as tolerated daily            Code Status/Level of Support:  Code Status and Medical Interventions:   Ordered at: 08/03/18 0159     Level Of Support Discussed With:    Patient     Code Status:    CPR     Medical Interventions (Level of Support Prior to Arrest):    Full       Future Appointments  Date Time Provider Department Center   9/5/2018 10:30 AM Dilip Krause MD [facility] None       Additional Instructions for the Follow-ups that You Need to Schedule     Discharge Follow-up with PCP    As directed      Currently Documented PCP:  Provider, No Known  PCP Phone Number:  None     Follow Up Details:  2 weeks         Discharge Follow-up with Specified Provider: [facility] in 3-4 weeks    As directed      To:  [facility] in 3-4 weeks         Discharge Follow-up with Specified Provider: VANE Krause in 4 weeks, may benefit from EGD    As directed      To:  VANE Krause in 4 weeks, may benefit from EGD               Time Spent on Discharge:  40 minutes    Electronically signed by Valentino Conway MD, 08/06/18, 12:23 PM.

## 2018-08-06 NOTE — DISCHARGE PLACEMENT REQUEST
"Pam Loyd  (68 y.o. Female)     IV order:  ertapenem 500 mg in sodium chloride 0.9 % 50 mL IVPB    500 mg, Intravenous, Every 24 Hours, for 6 days         Date of Birth Social Security Number Address Home Phone MRN    1950  [address]  [address]  [place] [id number] [phone] [id number]    Islam Marital Status          None        Admission Date Admission Type Admitting Provider Attending Provider Department, Room/Bed    18 Emergency Valentino Conway MD Russell, Marc P, MD [facility] 5G, S553/1    Discharge Date Discharge Disposition Discharge Destination                       Attending Provider:  Valentino Conway MD    Allergies:  Geodon [Ziprasidone Hcl], Haldol [Haloperidol Lactate], Seroquel [Quetiapine Fumarate]    Isolation:  Contact   Infection:  ESBL Klebsiella (18)   Code Status:  CPR    Ht:  162.6 cm (64\")   Wt:  135 kg (296 lb 15.4 oz)    Admission Cmt:  None   Principal Problem:  Anemia [D64.9] More...                 Active Insurance as of 2018     Primary Coverage     Payor Plan Insurance Group Employer/Plan Group    MEDICARE MEDICARE A & B      Payor Plan Address Payor Plan Phone Number Effective From Effective To    [address] [phone] 2016     [place] [id number]       Subscriber Name Subscriber Birth Date Member ID       PAM LOYD 1950 [id number]                 Emergency Contacts      (Rel.) Home Phone Work Phone Mobile Phone    Tessie Dorado (Daughter) [phone] --            Insurance Information                MEDICARE/MEDICARE A & B Phone: [phone]    Subscriber: Pam Loyd Subscriber#: [id number]    Group#:  Precert#:              History & Physical      Charisse Roy DO at 2018 11:41 PM              [facility]  HISTORY AND PHYSICAL    Patient Name: Pam Loyd  : 1950  MRN: [id number]  Primary Care " Physician: Provider, No Known; no PCP, currently managed by [facility] rehab provider  Nephrology: [facility]    Subjective   Subjective     Chief Complaint:  Abnormal lab    HPI:  Joy Bueno is a 68 y.o. female who presented to [facility] ED by EMS from her rehab facility at [place] for abnormal lab. Her 8/2/18 rehab h/h is found to be 7.3/22.2, which is down from their prior comparison 3/7/18 at 9.2/28.1. She was noted to have worsening creatinine but her lab is unchanged from those same dates (today Cr/BUN 3.9/115, and in March 3.7/92) - she has chronic CKD IV and follows with [facility]. She was admitted here at [facility] 11/2017 and comparisons to then were a Cr 2.4, and hgb 9-9.7. Unknown exact onset. Constant. Moderate. She appears dehydrated. Ms. Bueno states she is on diuretic and fluid restriction for edema.    Joy has been alert and oriented which is her baseline. She states she has been at rehab since January. She was hospitalized at [facility] five times in a 3 month period in fall 2017, with heavy concerns and even APS consult r/t self neglect, and unable to care for herself living alone. She is debilitated, and dependent on ADLs except for feeding. Ms. Bueno thinks she did go home since she was here last, and may have been to [facility], then to a rehab in [facility], and then to [facility] in January.    Her last EGD+colonoscopy was here at [facility] 8/25/17 and 8/29/17, found to have symptomatic anemia. She had refused endoscopy initially and was tx with IV iron and PRBCs, and then did agree on that admission (non-erosive gastritis, a few diverticula, could not reach ascending colon). It was discussed to f/u with nephrology, consider epoetin. She was also on eliquis at some point for unknown reasons and it was d/c.    She denies any known bleeding. She is being admitted to [facility] for further evaluation and treatment.    Review of Systems   Constitutional: Positive for fatigue. Negative for activity change, appetite change,  chills, diaphoresis and fever.   HENT: Negative for trouble swallowing.    Respiratory: Negative for apnea, cough, shortness of breath and wheezing.    Cardiovascular: Positive for leg swelling. Negative for chest pain and palpitations.        Chronic generalized edema/legs, improves with diuretics.   Gastrointestinal: Negative for abdominal pain, blood in stool, constipation, diarrhea, nausea and vomiting.   Genitourinary: Negative for dysuria and hematuria.        Denies dark or odorous urine.   Musculoskeletal: Negative for myalgias.   Skin: Negative for color change, pallor, rash and wound.        States bumps on her head and hair loss, chronic/gradual.   Allergic/Immunologic: Negative for immunocompromised state.   Neurological: Positive for weakness. Negative for dizziness and syncope.   Psychiatric/Behavioral: Negative for confusion.        Otherwise 10-system ROS reviewed and is negative except as mentioned in the HPI.    Personal History     Past Medical History:   Diagnosis Date   • Anemia    • Anxiety    • Asthma    • Chronic kidney disease    • Diabetes mellitus (CMS/[facility])    • Diabetes mellitus, type II (CMS/[facility]) 8/3/2018   • History of Clostridium difficile infection 8/3/2018   • History of UTI 8/3/2018   • Hypertension    • Morbid obesity (CMS/[facility])    • Osteoarthritis        Past Surgical History:   Procedure Laterality Date   •  SECTION     • COLONOSCOPY N/A 2017    Procedure: COLONOSCOPY;  Surgeon: Mark I Brunner, MD;  Location:  [facility];  Service:    • ENDOSCOPY N/A 2017    Procedure: ESOPHAGOGASTRODUODENOSCOPY ;  Surgeon: Velasquez Call MD;  Location:  [facility];  Service:    • HERNIA REPAIR         Family History: family history includes Cardiomyopathy in her mother; Diabetes in her father; Stroke in her father.     Social History:  reports that she has quit smoking. She smoked 0.25 packs per day. She does not have any smokeless tobacco history on file. She reports  that she does not drink alcohol or use drugs.  Social History     Social History Narrative    Mrs. Bueno is a 68 year old AA  female. She lives alone in [place] but has been in rehab for some time. She has a daughter. She does not have an advanced directive.       Medications:    No current facility-administered medications on file prior to encounter.      Current Outpatient Prescriptions on File Prior to Encounter   Medication Sig Dispense Refill   • acetaminophen (TYLENOL) 325 MG tablet Take 650 mg by mouth Every 4 (Four) Hours As Needed for Mild Pain (1-3).     • albuterol (PROVENTIL HFA;VENTOLIN HFA) 108 (90 BASE) MCG/ACT inhaler Inhale 2 puffs Every 4 (Four) Hours As Needed for Wheezing.     • amLODIPine (NORVASC) 10 MG tablet Take 1 tablet by mouth Daily. (Patient taking differently: Take 5 mg by mouth Daily.) 30 tablet 0   • atorvastatin (LIPITOR) 10 MG tablet Take 10 mg by mouth Every Night.     • clonazePAM (KlonoPIN) 0.5 MG tablet Take 0.5 tablets by mouth 2 (Two) Times a Day As Needed for Anxiety. 6 tablet 0   • phenylephrine-shark liver oil-mineral oil-petrolatum (PREPARATION H) 0.25-3-14-71.9 % rectal ointment Insert  into the rectum 3 (Three) Times a Day As Needed for Hemorrhoids.     • castor oil-balsam peru (VENELEX) ointment Apply 1 application topically Every 12 (Twelve) Hours.     • Cholecalciferol (VITAMIN D3) 31522 units tablet Take 50,000 Units by mouth Every 7 (Seven) Days.     • escitalopram (LEXAPRO) 10 MG tablet Take 1 tablet by mouth Daily.     • furosemide (LASIX) 40 MG tablet Take 40 mg by mouth 2 (Two) Times a Day.     • hydrALAZINE (APRESOLINE) 100 MG tablet Take 1 tablet by mouth 3 (Three) Times a Day. 90 tablet 0   • HYDROcodone-acetaminophen (NORCO) 5-325 MG per tablet Take 1 tablet by mouth Every 8 (Eight) Hours As Needed for Moderate Pain . 6 tablet 0   • isosorbide mononitrate (IMDUR) 60 MG 24 hr tablet Take 1 tablet by mouth Daily.     • miconazole (MICOTIN) 2 %  powder Apply  topically Every 12 (Twelve) Hours. 30 g 0   • mometasone-formoterol (DULERA 200) 200-5 MCG/ACT inhaler Inhale 2 puffs 2 (Two) Times a Day.     • Multiple Vitamins-Minerals (MULTIVITAMIN ADULTS PO) Take 1 tablet by mouth Daily.     • saccharomyces boulardii (FLORASTOR) 250 MG capsule Take 1 capsule by mouth 2 (Two) Times a Day.     • simethicone (MYLICON) 80 MG chewable tablet Chew 80 mg Every 6 (Six) Hours As Needed for Flatulence.         Allergies   Allergen Reactions   • Geodon [Ziprasidone Hcl] Unknown (See Comments)     PT DOESN'T REMEMBER   • Haldol [Haloperidol Lactate] Unknown (See Comments)     PT DOESN'T REMEMBER   • Seroquel [Quetiapine Fumarate] Unknown (See Comments)     PT DOESN'T REMEMBER       Objective   Objective     Vital Signs:   Temp:  [98.3 °F (36.8 °C)] 98.3 °F (36.8 °C)  Heart Rate:  [64-79] 76  Resp:  [16-18] 18  BP: (127-180)/(65-93) 146/65        Physical Exam   Constitutional: No acute distress, awake, alert  Eyes: PERRLA, sclerae anicteric, no conjunctival injection  HENT: NCAT, mucous membranes moist  Neck: Supple, no thyromegaly, no lymphadenopathy, trachea midline  Respiratory: Clear to auscultation bilaterally, nonlabored respirations   Cardiovascular: RRR, no murmurs, rubs, or gallops, palpable pedal pulses bilaterally  Gastrointestinal: Positive bowel sounds, soft, nontender, nondistended  Musculoskeletal:  bilateral ankle edema, no clubbing or cyanosis to extremities  Psychiatric: Appropriate affect, cooperative  Neurologic: Oriented x 3, strength symmetric in all extremities, Cranial Nerves grossly intact to confrontation, speech clear  Skin: No rashes      Results Reviewed:  I have personally reviewed current lab, radiology, and data and agree.    Lab Results (last 24 hours)     Procedure Component Value Units Date/Time    CBC & Differential [[id number]] Collected:  08/02/18 1858    Specimen:  Blood Updated:  08/02/18 1919    Narrative:       The following orders  were created for panel order CBC & Differential.  Procedure                               Abnormality         Status                     ---------                               -----------         ------                     CBC Auto Differential[[id number]]        Abnormal            Final result                 Please view results for these tests on the individual orders.    Comprehensive Metabolic Panel [[id number]]  (Abnormal) Collected:  08/02/18 1858    Specimen:  Blood Updated:  08/02/18 1937     Glucose 164 (H) mg/dL       (H) mg/dL      Creatinine 3.82 (H) mg/dL      Sodium 143 mmol/L      Potassium 3.6 mmol/L      Chloride 106 mmol/L      CO2 26.0 mmol/L      Calcium 9.2 mg/dL      Total Protein 7.9 g/dL      Albumin 3.83 g/dL      ALT (SGPT) 11 U/L      AST (SGOT) 9 U/L      Alkaline Phosphatase 110 (H) U/L      Total Bilirubin 0.3 mg/dL      eGFR  African Amer 14 (L) mL/min/1.73      Globulin 4.1 gm/dL      A/G Ratio 0.9 (L) g/dL      BUN/Creatinine Ratio 30.4 (H)     Anion Gap 11.0 mmol/L     Narrative:       National Kidney Foundation Guidelines    Stage     Description        GFR  1         Normal or High     90+  2         Mild decrease      60-89  3         Moderate decrease  30-59  4         Severe decrease    15-29  5         Kidney failure     <15    Protime-INR [[id number]]  (Normal) Collected:  08/02/18 1858    Specimen:  Blood Updated:  08/02/18 1941     Protime 10.2 Seconds      INR 0.97    aPTT [[id number]]  (Normal) Collected:  08/02/18 1858    Specimen:  Blood Updated:  08/02/18 1941     PTT 27.5 seconds     Narrative:       PTT = The equivalent PTT values for the therapeutic range of heparin levels at 0.3 to 0.5 U/ml are 55 to 70 seconds.    CBC Auto Differential [[id number]]  (Abnormal) Collected:  08/02/18 1858    Specimen:  Blood Updated:  08/02/18 1919     WBC 11.59 (H) 10*3/mm3      RBC 2.62 (L) 10*6/mm3      Hemoglobin 8.3 (L) g/dL      Hematocrit 25.5 (L) %      MCV 97.3 fL       MCH 31.7 (H) pg      MCHC 32.5 g/dL      RDW 13.5 %      RDW-SD 47.5 fl      MPV 9.7 fL      Platelets 270 10*3/mm3      Neutrophil % 71.6 (H) %      Lymphocyte % 17.8 (L) %      Monocyte % 3.6 %      Eosinophil % 5.6 (H) %      Basophil % 0.2 %      Immature Grans % 1.2 (H) %      Neutrophils, Absolute 8.30 10*3/mm3      Lymphocytes, Absolute 2.06 10*3/mm3      Monocytes, Absolute 0.42 10*3/mm3      Eosinophils, Absolute 0.65 (H) 10*3/mm3      Basophils, Absolute 0.02 10*3/mm3      Immature Grans, Absolute 0.14 (H) 10*3/mm3     POCT Occult Blood, stool [[id number]]  (Normal) Collected:  08/02/18 2011    Specimen:  Stool from Per Rectum Updated:  08/02/18 2012     Fecal Occult Blood Negative     Lot Number [id number]     Expiration Date 1-21     DEVELOPER LOT NUMBER [id number]     DEVELOPER EXPIRATION DATE 2,021-7     Positive Control Positive     Negative Control Negative    Hemoglobin & Hematocrit, Blood [[id number]]  (Abnormal) Collected:  08/02/18 2147    Specimen:  Blood from Arm, Right Updated:  08/02/18 2156     Hemoglobin 7.6 (L) g/dL      Hematocrit 23.8 (L) %           Estimated Creatinine Clearance: 19.2 mL/min (A) (by C-G formula based on SCr of 3.82 mg/dL (H)).  Brief Urine Lab Results  (Last result in the past 365 days)      Color   Clarity   Blood   Leuk Est   Nitrite   Protein   CREAT   Urine HCG        11/04/17 0215 Yellow Cloudy(A) Small (1+)(A) Negative Negative >=300 mg/dL (3+)(A)             No results found for: BNP  Imaging Results (last 24 hours)     ** No results found for the last 24 hours. **        Results for orders placed during the hospital encounter of 09/02/17   Adult Transthoracic Echo Complete    Narrative · Left ventricular wall thickness is consistent with mild concentric   hypertrophy.  · Left atrial cavity size is mildly dilated.  · Mild mitral valve regurgitation is present  · calcification of the aortic valve  · Mild tricuspid valve regurgitation is present.          Assessment/Plan  "  Assessment / Plan     Hospital Problem List     * (Principal)Anemia    Overview Addendum 8/28/2017 11:03 AM by Piyush Stokes MD     Iron deficient         Hypertension (Chronic)    Morbid obesity (CMS/[facility]) (Chronic)    Renal failure    Overview Addendum 8/24/2017  2:20 AM by Esperanza Villegas APRN     Renal Biopsy 08/10/17: Diabetic neuropathy, mild to moderate atherosclerosis            Anemia in stage 4 chronic kidney disease (CMS/[facility])    NATALIO (acute kidney injury) (CMS/HCC)    CKD (chronic kidney disease) stage 4, GFR 15-29 ml/min (CMS/[facility])    Diabetes mellitus, type II (CMS/[facility]) (Chronic)    History of UTI    History of Clostridium difficile infection            Assessment & Plan:    1. Consult NAL. Cr is unchanged from rehab comparison in March, BUN slightly more, unclear if any GIB component, consider NATALIO on CKD IV vs ESRD.     2. Occult was negative, when here 8/2017 with anemia only 1/3 occult was positive and overall EGD+colonoscopy without explanation. On iron supplement. Consider need for EPO.     3. Denies any hx CHF and ECHO 9/2017 unrevealing, on bumex 2 mg PO BID and metolazone 5 mg PO daily and a fluid restriction (1500 ml daily). On coreg, amlodipine, hydralazine, isosorbide. On a daily PPI for GERD.     4. Will d/c her oral diabetes medication for renal and inpatient, and change to SSI. A1c 5/3/18 6.2. On statin.    5. Suspect she finished her rehab days and is at [facility] for LTC in medicaid bed. Consult CM/SW for discharge planning. Needs placement if not for chronic debility. Morbidly obese, chronic fatigue/weakness.    6. Denies any psych hx. Anxiety hx in chart. On risperidal HS, klonopin daily, and allergies of chart to geodon/seroquel/haldol with unknown reactions - \"aren't those for crazy people?... I don't think I've ever taken those.\"    7. Hx recurrent UTIs, will check UA. Hx c diff last fall 2017, denies any recent diarrhea.    DVT prophylaxis: Scuds, hold pharmacological with " anemia workup.    CODE STATUS:  Full code / full support, no advanced directives, one daughter is next of kin.  There are no questions and answers to display.       Admission Status:  I believe this patient meets INPATIENT status due to the need for care which can only be reasonably provided in an hospital setting such as aggressive/expedited ancillary services and/or consultation services, the necessity for IV medications, close physician monitoring and/or the possible need for procedures.  In such, I feel patient’s risk for adverse outcomes and need for care warrant INPATIENT evaluation and predict the patient’s care encounter to likely last beyond 2 midnights.        Brief Attending Admission Attestation     I have seen and examined the patient, performing an independent face-to-face diagnostic evaluation with plan of care reviewed and developed with the advanced practice clinician (APC).      Brief Summary Statement/HPI:   Joy Bueno is a 68 y.o. female  with a past medical history of CK D, anemia of chronic disease, diabetes mellitus and hypertension who presents to the ED after abnormal laboratory data was obtained at skilled nursing [facility] today.  The patient states that she has been feeling weak and somewhat decreased from baseline for over a week.  She states that she has been on a fluid restricted diet and feels she is dehydrated.  She denies cough, fever, chest pain, difficulty breathing, hematochezia, melena, hematemesis.  She reports that she is eating a moderate diet.  She was seen by nephrologist approximately 1 year ago per her report for she had a kidney biopsy and results were indeterminate.    Attending Physical Exam:  Constitutional: No acute distress, awake, alert pleasant  Eyes: PERRLA, sclerae anicteric, no conjunctival injection  HENT: NCAT, mucous membranes dry  Neck: Supple, no thyromegaly, no lymphadenopathy, trachea midline  Respiratory: Moderate aeration  Cardiovascular: RRR, no  murmurs, rubs, or gallops, palpable pedal pulses bilaterally  Gastrointestinal: Positive bowel sounds, soft, nontender, nondistended  Musculoskeletal: bilateral ankle edema, no clubbing or cyanosis to extremities  Psychiatric: Appropriate affect, cooperative  Neurologic: Oriented x 3, strength symmetric in all extremities, Cranial Nerves grossly intact to confrontation, speech clear  Skin: No rashes        Brief Assessment/Plan :  See above for further detailed assessment and plan developed with APC which I have reviewed and/or edited.      Electronically signed by Charisse Roy DO, 08/03/18, 1:57 AM.           Electronically signed by Charisse Roy DO, 08/02/18, 11:41 PM.          Electronically signed by Charisse Roy DO at 8/3/2018  2:03 AM       Physician Progress Notes (last 24 hours) (Notes from 8/5/2018 12:40 PM through 8/6/2018 12:40 PM)     No notes of this type exist for this encounter.        Physical Therapy Notes (last 24 hours) (Notes from 8/5/2018 12:40 PM through 8/6/2018 12:40 PM)     No notes of this type exist for this encounter.        Occupational Therapy Notes (last 24 hours) (Notes from 8/5/2018 12:40 PM through 8/6/2018 12:40 PM)     No notes of this type exist for this encounter.

## 2018-08-06 NOTE — PLAN OF CARE
Problem: Fall Risk (Adult)  Goal: Identify Related Risk Factors and Signs and Symptoms  Outcome: Ongoing (interventions implemented as appropriate)    Goal: Absence of Fall  Outcome: Ongoing (interventions implemented as appropriate)      Problem: Patient Care Overview  Goal: Plan of Care Review  Outcome: Ongoing (interventions implemented as appropriate)    Goal: Individualization and Mutuality  Outcome: Ongoing (interventions implemented as appropriate)    Goal: Discharge Needs Assessment  Outcome: Ongoing (interventions implemented as appropriate)    Goal: Interprofessional Rounds/Family Conf  Outcome: Ongoing (interventions implemented as appropriate)      Problem: Skin Injury Risk (Adult)  Goal: Identify Related Risk Factors and Signs and Symptoms  Outcome: Ongoing (interventions implemented as appropriate)    Goal: Skin Health and Integrity  Outcome: Ongoing (interventions implemented as appropriate)      Problem: Infection, Risk/Actual (Adult)  Goal: Identify Related Risk Factors and Signs and Symptoms  Outcome: Ongoing (interventions implemented as appropriate)    Goal: Infection Prevention/Resolution  Outcome: Ongoing (interventions implemented as appropriate)

## 2018-08-06 NOTE — PROGRESS NOTES
Case Management Discharge Note  Final Note: Pt is being transferred to [facility] today via [facility] at 1600 (note time change from previous note). Pt IM Invanz has been approved by DON at [facility] and will be filled at [facility] as stated by Keshawn MURRAY in admissions for [facility]. CM spoke w Dr Conway who will include a hard script for this. RN needs to send a copy of the hard script for Invanz with this patient at d/c. CM called Renetta pt RN and let her know she needed to send this w patient. RN can call report to [phone] and CM will fax d/c summary to [phone] as requested. [facility] form is updated and in chartlet. No other needs at this time. CM will follow through d/c.         Destination     No service has been selected for the patient.      Durable Medical Equipment     No service has been selected for the patient.      Dialysis/Infusion     No service has been selected for the patient.      Home Medical Care     No service has been selected for the patient.      Social Care     No service has been selected for the patient.             Final Discharge Disposition Code: 03 - skilled nursing facility (SNF)

## 2018-08-07 NOTE — PROGRESS NOTES
Continued Stay Note  [facility]     Patient Name: Joy Bueno  MRN: [id number]  Today's Date: 8/7/2018    Admit Date: 8/2/2018    Consent obtained for the participation in the [facility]itions Program. Ca Allred RN        Discharge Plan    No documentation.             Discharge Codes    No documentation.       Expected Discharge Date and Time     Expected Discharge Date Expected Discharge Time    Aug 6, 2018             Ca Allred RN

## 2018-08-31 DIAGNOSIS — Z12.11 SCREENING FOR COLON CANCER: Primary | ICD-10-CM

## 2018-08-31 RX ORDER — SODIUM, POTASSIUM,MAG SULFATES 17.5-3.13G
1 SOLUTION, RECONSTITUTED, ORAL ORAL TAKE AS DIRECTED
Qty: 2 BOTTLE | Refills: 0 | Status: SHIPPED | OUTPATIENT
Start: 2018-08-31 | End: 2018-08-31

## 2018-09-12 PROBLEM — D63.1 ANEMIA OF CHRONIC RENAL FAILURE, STAGE 5 (HCC): Status: ACTIVE | Noted: 2018-09-12

## 2018-09-12 PROBLEM — N18.5 ANEMIA OF CHRONIC RENAL FAILURE, STAGE 5 (HCC): Status: ACTIVE | Noted: 2018-09-12

## 2018-09-14 ENCOUNTER — HOSPITAL ENCOUNTER (EMERGENCY)
Facility: HOSPITAL | Age: 68
Discharge: HOME | End: 2018-09-14
Attending: EMERGENCY MEDICINE | Admitting: EMERGENCY MEDICINE

## 2018-09-14 VITALS
HEART RATE: 67 BPM | RESPIRATION RATE: 18 BRPM | WEIGHT: 250 LBS | OXYGEN SATURATION: 100 % | SYSTOLIC BLOOD PRESSURE: 154 MMHG | BODY MASS INDEX: 42.68 KG/M2 | DIASTOLIC BLOOD PRESSURE: 68 MMHG | HEIGHT: 64 IN | TEMPERATURE: 97.8 F

## 2018-09-14 DIAGNOSIS — D63.8 ANEMIA OF CHRONIC DISEASE: Primary | ICD-10-CM

## 2018-09-14 LAB
ABO GROUP BLD: NORMAL
ALBUMIN SERPL-MCNC: 3.48 G/DL (ref 3.2–4.8)
ALBUMIN/GLOB SERPL: 1.2 G/DL (ref 1.5–2.5)
ALP SERPL-CCNC: 88 U/L (ref 25–100)
ALT SERPL W P-5'-P-CCNC: 8 U/L (ref 7–40)
ANION GAP SERPL CALCULATED.3IONS-SCNC: 7 MMOL/L (ref 3–11)
AST SERPL-CCNC: 10 U/L (ref 0–33)
BASOPHILS # BLD AUTO: 0.03 10*3/MM3 (ref 0–0.2)
BASOPHILS NFR BLD AUTO: 0.3 % (ref 0–1)
BILIRUB SERPL-MCNC: 0.2 MG/DL (ref 0.3–1.2)
BLD GP AB SCN SERPL QL: NEGATIVE
BUN BLD-MCNC: 68 MG/DL (ref 9–23)
BUN/CREAT SERPL: 18.8 (ref 7–25)
CALCIUM SPEC-SCNC: 8.5 MG/DL (ref 8.7–10.4)
CHLORIDE SERPL-SCNC: 113 MMOL/L (ref 99–109)
CO2 SERPL-SCNC: 20 MMOL/L (ref 20–31)
CREAT BLD-MCNC: 3.61 MG/DL (ref 0.6–1.3)
DEPRECATED RDW RBC AUTO: 58 FL (ref 37–54)
EOSINOPHIL # BLD AUTO: 0.3 10*3/MM3 (ref 0–0.3)
EOSINOPHIL NFR BLD AUTO: 2.7 % (ref 0–3)
ERYTHROCYTE [DISTWIDTH] IN BLOOD BY AUTOMATED COUNT: 15.9 % (ref 11.3–14.5)
GFR SERPL CREATININE-BSD FRML MDRD: 15 ML/MIN/1.73
GLOBULIN UR ELPH-MCNC: 3 GM/DL
GLUCOSE BLD-MCNC: 111 MG/DL (ref 70–100)
HCT VFR BLD AUTO: 24.1 % (ref 34.5–44)
HGB BLD-MCNC: 7.4 G/DL (ref 11.5–15.5)
HOLD SPECIMEN: NORMAL
HOLD SPECIMEN: NORMAL
IMM GRANULOCYTES # BLD: 0.12 10*3/MM3 (ref 0–0.03)
IMM GRANULOCYTES NFR BLD: 1.1 % (ref 0–0.6)
LYMPHOCYTES # BLD AUTO: 1.95 10*3/MM3 (ref 0.6–4.8)
LYMPHOCYTES NFR BLD AUTO: 17.6 % (ref 24–44)
MCH RBC QN AUTO: 30.2 PG (ref 27–31)
MCHC RBC AUTO-ENTMCNC: 30.7 G/DL (ref 32–36)
MCV RBC AUTO: 98.4 FL (ref 80–99)
MONOCYTES # BLD AUTO: 0.69 10*3/MM3 (ref 0–1)
MONOCYTES NFR BLD AUTO: 6.2 % (ref 0–12)
NEUTROPHILS # BLD AUTO: 8.1 10*3/MM3 (ref 1.5–8.3)
NEUTROPHILS NFR BLD AUTO: 73.2 % (ref 41–71)
PLATELET # BLD AUTO: 243 10*3/MM3 (ref 150–450)
PMV BLD AUTO: 9.6 FL (ref 6–12)
POTASSIUM BLD-SCNC: 5 MMOL/L (ref 3.5–5.5)
PROT SERPL-MCNC: 6.5 G/DL (ref 5.7–8.2)
RBC # BLD AUTO: 2.45 10*6/MM3 (ref 3.89–5.14)
RH BLD: POSITIVE
SODIUM BLD-SCNC: 140 MMOL/L (ref 132–146)
T&S EXPIRATION DATE: NORMAL
WBC NRBC COR # BLD: 11.07 10*3/MM3 (ref 3.5–10.8)
WHOLE BLOOD HOLD SPECIMEN: NORMAL
WHOLE BLOOD HOLD SPECIMEN: NORMAL

## 2018-09-14 PROCEDURE — 86900 BLOOD TYPING SEROLOGIC ABO: CPT | Performed by: EMERGENCY MEDICINE

## 2018-09-14 PROCEDURE — 80053 COMPREHEN METABOLIC PANEL: CPT | Performed by: EMERGENCY MEDICINE

## 2018-09-14 PROCEDURE — 85025 COMPLETE CBC W/AUTO DIFF WBC: CPT | Performed by: EMERGENCY MEDICINE

## 2018-09-14 PROCEDURE — 86901 BLOOD TYPING SEROLOGIC RH(D): CPT | Performed by: EMERGENCY MEDICINE

## 2018-09-14 PROCEDURE — 86850 RBC ANTIBODY SCREEN: CPT | Performed by: EMERGENCY MEDICINE

## 2018-09-14 PROCEDURE — 36415 COLL VENOUS BLD VENIPUNCTURE: CPT

## 2018-09-14 PROCEDURE — 99284 EMERGENCY DEPT VISIT MOD MDM: CPT

## 2018-09-14 RX ORDER — SODIUM CHLORIDE 0.9 % (FLUSH) 0.9 %
10 SYRINGE (ML) INJECTION AS NEEDED
Status: DISCONTINUED | OUTPATIENT
Start: 2018-09-14 | End: 2018-09-15 | Stop reason: HOSPADM

## 2018-09-14 NOTE — DISCHARGE INSTRUCTIONS
Your level was 7.4 today which is above the hospital threshold for transfusion.  Continue following your levels with your doctor.  Return to ED for shortness of breath, low blood pressure, active bleeding or any concerning changes.

## 2018-10-14 ENCOUNTER — APPOINTMENT (OUTPATIENT)
Dept: GENERAL RADIOLOGY | Facility: HOSPITAL | Age: 68
End: 2018-10-14

## 2018-10-14 ENCOUNTER — APPOINTMENT (OUTPATIENT)
Dept: CT IMAGING | Facility: HOSPITAL | Age: 68
End: 2018-10-14

## 2018-10-14 ENCOUNTER — HOSPITAL ENCOUNTER (INPATIENT)
Facility: HOSPITAL | Age: 68
LOS: 9 days | Discharge: NURSING, REHAB OR LONG-TERM CARE | End: 2018-10-23
Attending: EMERGENCY MEDICINE | Admitting: SURGERY

## 2018-10-14 DIAGNOSIS — Z86.19 HISTORY OF ESBL KLEBSIELLA PNEUMONIAE INFECTION: ICD-10-CM

## 2018-10-14 DIAGNOSIS — N17.9 ACUTE RENAL FAILURE SUPERIMPOSED ON CHRONIC KIDNEY DISEASE, UNSPECIFIED CKD STAGE, UNSPECIFIED ACUTE RENAL FAILURE TYPE (HCC): ICD-10-CM

## 2018-10-14 DIAGNOSIS — Z78.9 IMPAIRED MOBILITY AND ADLS: ICD-10-CM

## 2018-10-14 DIAGNOSIS — D63.8 ANEMIA OF CHRONIC DISEASE: ICD-10-CM

## 2018-10-14 DIAGNOSIS — N39.0 ACUTE UTI: Primary | ICD-10-CM

## 2018-10-14 DIAGNOSIS — Z74.09 IMPAIRED MOBILITY AND ADLS: ICD-10-CM

## 2018-10-14 DIAGNOSIS — N18.9 ACUTE RENAL FAILURE SUPERIMPOSED ON CHRONIC KIDNEY DISEASE, UNSPECIFIED CKD STAGE, UNSPECIFIED ACUTE RENAL FAILURE TYPE (HCC): ICD-10-CM

## 2018-10-14 DIAGNOSIS — Z74.09 IMPAIRED FUNCTIONAL MOBILITY, BALANCE, GAIT, AND ENDURANCE: ICD-10-CM

## 2018-10-14 PROBLEM — A41.9 SEPSIS (HCC): Status: ACTIVE | Noted: 2018-10-14

## 2018-10-14 PROBLEM — G93.41 METABOLIC ENCEPHALOPATHY: Status: ACTIVE | Noted: 2018-10-14

## 2018-10-14 PROBLEM — E87.5 HYPERKALEMIA: Status: ACTIVE | Noted: 2018-10-14

## 2018-10-14 PROBLEM — R65.20 SEVERE SEPSIS (HCC): Status: ACTIVE | Noted: 2018-10-14

## 2018-10-14 PROBLEM — A41.9 SEVERE SEPSIS (HCC): Status: ACTIVE | Noted: 2018-10-14

## 2018-10-14 LAB
ABO GROUP BLD: NORMAL
ALBUMIN SERPL-MCNC: 3.37 G/DL (ref 3.2–4.8)
ALBUMIN/GLOB SERPL: 1.1 G/DL (ref 1.5–2.5)
ALP SERPL-CCNC: 86 U/L (ref 25–100)
ALT SERPL W P-5'-P-CCNC: 14 U/L (ref 7–40)
ANION GAP SERPL CALCULATED.3IONS-SCNC: 7 MMOL/L (ref 3–11)
ARTERIAL PATENCY WRIST A: ABNORMAL
AST SERPL-CCNC: 15 U/L (ref 0–33)
ATMOSPHERIC PRESS: ABNORMAL MMHG
BACTERIA UR QL AUTO: ABNORMAL /HPF
BASE EXCESS BLDA CALC-SCNC: -9.3 MMOL/L (ref 0–2)
BASOPHILS # BLD AUTO: 0.04 10*3/MM3 (ref 0–0.2)
BASOPHILS NFR BLD AUTO: 0.3 % (ref 0–1)
BDY SITE: ABNORMAL
BILIRUB SERPL-MCNC: 0.2 MG/DL (ref 0.3–1.2)
BILIRUB UR QL STRIP: NEGATIVE
BLD GP AB SCN SERPL QL: NEGATIVE
BNP SERPL-MCNC: 682 PG/ML (ref 0–100)
BUN BLD-MCNC: 73 MG/DL (ref 9–23)
BUN/CREAT SERPL: 17 (ref 7–25)
CALCIUM SPEC-SCNC: 8.7 MG/DL (ref 8.7–10.4)
CHLORIDE SERPL-SCNC: 119 MMOL/L (ref 99–109)
CLARITY UR: ABNORMAL
CO2 BLDA-SCNC: 19.7 MMOL/L (ref 22–33)
CO2 SERPL-SCNC: 18 MMOL/L (ref 20–31)
COARSE GRAN CASTS URNS QL MICRO: ABNORMAL /LPF
COHGB MFR BLD: 1.3 % (ref 0–2)
COLOR UR: YELLOW
CREAT BLD-MCNC: 4.29 MG/DL (ref 0.6–1.3)
DEPRECATED RDW RBC AUTO: 55 FL (ref 37–54)
EOSINOPHIL # BLD AUTO: 0.19 10*3/MM3 (ref 0–0.3)
EOSINOPHIL NFR BLD AUTO: 1.3 % (ref 0–3)
ERYTHROCYTE [DISTWIDTH] IN BLOOD BY AUTOMATED COUNT: 15.2 % (ref 11.3–14.5)
GFR SERPL CREATININE-BSD FRML MDRD: 12 ML/MIN/1.73
GLOBULIN UR ELPH-MCNC: 3.1 GM/DL
GLUCOSE BLD-MCNC: 96 MG/DL (ref 70–100)
GLUCOSE BLDC GLUCOMTR-MCNC: 146 MG/DL (ref 70–130)
GLUCOSE UR STRIP-MCNC: NEGATIVE MG/DL
HCO3 BLDA-SCNC: 18.2 MMOL/L (ref 20–26)
HCT VFR BLD AUTO: 23.1 % (ref 34.5–44)
HCT VFR BLD CALC: 20.3 %
HGB BLD-MCNC: 7 G/DL (ref 11.5–15.5)
HGB BLDA-MCNC: 6.6 G/DL (ref 14–18)
HGB UR QL STRIP.AUTO: ABNORMAL
HOROWITZ INDEX BLD+IHG-RTO: 28 %
HYALINE CASTS UR QL AUTO: ABNORMAL /LPF
IMM GRANULOCYTES # BLD: 0.25 10*3/MM3 (ref 0–0.03)
IMM GRANULOCYTES NFR BLD: 1.8 % (ref 0–0.6)
KETONES UR QL STRIP: NEGATIVE
LEUKOCYTE ESTERASE UR QL STRIP.AUTO: ABNORMAL
LYMPHOCYTES # BLD AUTO: 1.28 10*3/MM3 (ref 0.6–4.8)
LYMPHOCYTES NFR BLD AUTO: 9 % (ref 24–44)
MCH RBC QN AUTO: 30.3 PG (ref 27–31)
MCHC RBC AUTO-ENTMCNC: 30.3 G/DL (ref 32–36)
MCV RBC AUTO: 100 FL (ref 80–99)
METHGB BLD QL: 1.2 % (ref 0–1.5)
MODALITY: ABNORMAL
MONOCYTES # BLD AUTO: 0.88 10*3/MM3 (ref 0–1)
MONOCYTES NFR BLD AUTO: 6.2 % (ref 0–12)
NEUTROPHILS # BLD AUTO: 11.81 10*3/MM3 (ref 1.5–8.3)
NEUTROPHILS NFR BLD AUTO: 83.2 % (ref 41–71)
NITRITE UR QL STRIP: NEGATIVE
OXYHGB MFR BLDV: 96.1 % (ref 94–99)
PCO2 BLDA: 48.1 MM HG (ref 35–45)
PH BLDA: 7.19 PH UNITS (ref 7.35–7.45)
PH UR STRIP.AUTO: 6 [PH] (ref 5–8)
PLATELET # BLD AUTO: 238 10*3/MM3 (ref 150–450)
PMV BLD AUTO: 9.9 FL (ref 6–12)
PO2 BLDA: 127 MM HG (ref 83–108)
POTASSIUM BLD-SCNC: 5.9 MMOL/L (ref 3.5–5.5)
PROT SERPL-MCNC: 6.5 G/DL (ref 5.7–8.2)
PROT UR QL STRIP: ABNORMAL
RBC # BLD AUTO: 2.31 10*6/MM3 (ref 3.89–5.14)
RBC # UR: ABNORMAL /HPF
REF LAB TEST METHOD: ABNORMAL
RH BLD: POSITIVE
SODIUM BLD-SCNC: 144 MMOL/L (ref 132–146)
SP GR UR STRIP: 1.02 (ref 1–1.03)
SQUAMOUS #/AREA URNS HPF: ABNORMAL /HPF
T&S EXPIRATION DATE: NORMAL
TROPONIN I SERPL-MCNC: 0 NG/ML (ref 0–0.07)
UROBILINOGEN UR QL STRIP: ABNORMAL
WBC NRBC COR # BLD: 14.2 10*3/MM3 (ref 3.5–10.8)
WBC UR QL AUTO: ABNORMAL /HPF

## 2018-10-14 PROCEDURE — 80053 COMPREHEN METABOLIC PANEL: CPT | Performed by: PHYSICIAN ASSISTANT

## 2018-10-14 PROCEDURE — 71046 X-RAY EXAM CHEST 2 VIEWS: CPT

## 2018-10-14 PROCEDURE — 36415 COLL VENOUS BLD VENIPUNCTURE: CPT

## 2018-10-14 PROCEDURE — 25010000002 HEPARIN (PORCINE) PER 1000 UNITS: Performed by: NURSE PRACTITIONER

## 2018-10-14 PROCEDURE — 36430 TRANSFUSION BLD/BLD COMPNT: CPT

## 2018-10-14 PROCEDURE — 94640 AIRWAY INHALATION TREATMENT: CPT

## 2018-10-14 PROCEDURE — 87040 BLOOD CULTURE FOR BACTERIA: CPT | Performed by: PHYSICIAN ASSISTANT

## 2018-10-14 PROCEDURE — 99223 1ST HOSP IP/OBS HIGH 75: CPT | Performed by: HOSPITALIST

## 2018-10-14 PROCEDURE — 86923 COMPATIBILITY TEST ELECTRIC: CPT

## 2018-10-14 PROCEDURE — 81001 URINALYSIS AUTO W/SCOPE: CPT | Performed by: PHYSICIAN ASSISTANT

## 2018-10-14 PROCEDURE — 82962 GLUCOSE BLOOD TEST: CPT

## 2018-10-14 PROCEDURE — 86850 RBC ANTIBODY SCREEN: CPT | Performed by: PHYSICIAN ASSISTANT

## 2018-10-14 PROCEDURE — 36600 WITHDRAWAL OF ARTERIAL BLOOD: CPT | Performed by: NURSE PRACTITIONER

## 2018-10-14 PROCEDURE — 83880 ASSAY OF NATRIURETIC PEPTIDE: CPT | Performed by: PHYSICIAN ASSISTANT

## 2018-10-14 PROCEDURE — 93005 ELECTROCARDIOGRAM TRACING: CPT | Performed by: PHYSICIAN ASSISTANT

## 2018-10-14 PROCEDURE — 25010000002 ERTAPENEM PER 500 MG: Performed by: PHYSICIAN ASSISTANT

## 2018-10-14 PROCEDURE — 87077 CULTURE AEROBIC IDENTIFY: CPT | Performed by: FAMILY MEDICINE

## 2018-10-14 PROCEDURE — 86900 BLOOD TYPING SEROLOGIC ABO: CPT

## 2018-10-14 PROCEDURE — 85025 COMPLETE CBC W/AUTO DIFF WBC: CPT | Performed by: PHYSICIAN ASSISTANT

## 2018-10-14 PROCEDURE — P9016 RBC LEUKOCYTES REDUCED: HCPCS

## 2018-10-14 PROCEDURE — 70450 CT HEAD/BRAIN W/O DYE: CPT

## 2018-10-14 PROCEDURE — 86900 BLOOD TYPING SEROLOGIC ABO: CPT | Performed by: PHYSICIAN ASSISTANT

## 2018-10-14 PROCEDURE — 86901 BLOOD TYPING SEROLOGIC RH(D): CPT | Performed by: PHYSICIAN ASSISTANT

## 2018-10-14 PROCEDURE — 99285 EMERGENCY DEPT VISIT HI MDM: CPT

## 2018-10-14 PROCEDURE — 84484 ASSAY OF TROPONIN QUANT: CPT

## 2018-10-14 PROCEDURE — 82805 BLOOD GASES W/O2 SATURATION: CPT | Performed by: NURSE PRACTITIONER

## 2018-10-14 PROCEDURE — 94660 CPAP INITIATION&MGMT: CPT

## 2018-10-14 PROCEDURE — 87086 URINE CULTURE/COLONY COUNT: CPT | Performed by: FAMILY MEDICINE

## 2018-10-14 PROCEDURE — 94799 UNLISTED PULMONARY SVC/PX: CPT

## 2018-10-14 PROCEDURE — 87186 SC STD MICRODIL/AGAR DIL: CPT | Performed by: FAMILY MEDICINE

## 2018-10-14 RX ORDER — BUDESONIDE AND FORMOTEROL FUMARATE DIHYDRATE 160; 4.5 UG/1; UG/1
2 AEROSOL RESPIRATORY (INHALATION)
Status: DISCONTINUED | OUTPATIENT
Start: 2018-10-14 | End: 2018-10-23 | Stop reason: HOSPADM

## 2018-10-14 RX ORDER — PANTOPRAZOLE SODIUM 40 MG/1
40 TABLET, DELAYED RELEASE ORAL DAILY
Status: CANCELLED | OUTPATIENT
Start: 2018-10-14

## 2018-10-14 RX ORDER — SACCHAROMYCES BOULARDII 250 MG
250 CAPSULE ORAL DAILY
Status: CANCELLED | OUTPATIENT
Start: 2018-10-14

## 2018-10-14 RX ORDER — SACCHAROMYCES BOULARDII 250 MG
250 CAPSULE ORAL 2 TIMES DAILY
Status: DISCONTINUED | OUTPATIENT
Start: 2018-10-14 | End: 2018-10-23 | Stop reason: HOSPADM

## 2018-10-14 RX ORDER — SODIUM CHLORIDE 9 MG/ML
125 INJECTION, SOLUTION INTRAVENOUS CONTINUOUS
Status: DISCONTINUED | OUTPATIENT
Start: 2018-10-14 | End: 2018-10-14

## 2018-10-14 RX ORDER — NICOTINE POLACRILEX 4 MG
15 LOZENGE BUCCAL
Status: DISCONTINUED | OUTPATIENT
Start: 2018-10-14 | End: 2018-10-23 | Stop reason: HOSPADM

## 2018-10-14 RX ORDER — TRAZODONE HYDROCHLORIDE 50 MG/1
25 TABLET ORAL NIGHTLY PRN
COMMUNITY
End: 2018-10-23 | Stop reason: HOSPADM

## 2018-10-14 RX ORDER — CALCITRIOL 0.25 UG/1
0.25 CAPSULE, LIQUID FILLED ORAL DAILY
Status: CANCELLED | OUTPATIENT
Start: 2018-10-14

## 2018-10-14 RX ORDER — ALBUTEROL SULFATE 90 UG/1
2 AEROSOL, METERED RESPIRATORY (INHALATION) EVERY 4 HOURS PRN
Status: CANCELLED | OUTPATIENT
Start: 2018-10-14

## 2018-10-14 RX ORDER — FUROSEMIDE 10 MG/ML
40 INJECTION INTRAMUSCULAR; INTRAVENOUS ONCE
Status: COMPLETED | OUTPATIENT
Start: 2018-10-14 | End: 2018-10-15

## 2018-10-14 RX ORDER — CARVEDILOL 12.5 MG/1
25 TABLET ORAL 2 TIMES DAILY WITH MEALS
Status: DISCONTINUED | OUTPATIENT
Start: 2018-10-15 | End: 2018-10-23 | Stop reason: HOSPADM

## 2018-10-14 RX ORDER — HEPARIN SODIUM 5000 [USP'U]/ML
5000 INJECTION, SOLUTION INTRAVENOUS; SUBCUTANEOUS EVERY 12 HOURS SCHEDULED
Status: DISCONTINUED | OUTPATIENT
Start: 2018-10-14 | End: 2018-10-23 | Stop reason: HOSPADM

## 2018-10-14 RX ORDER — POLYETHYLENE GLYCOL 3350 17 G/17G
17 POWDER, FOR SOLUTION ORAL DAILY
Status: CANCELLED | OUTPATIENT
Start: 2018-10-14

## 2018-10-14 RX ORDER — ACETAMINOPHEN 325 MG/1
650 TABLET ORAL EVERY 4 HOURS PRN
Status: CANCELLED | OUTPATIENT
Start: 2018-10-14

## 2018-10-14 RX ORDER — DEXTROSE MONOHYDRATE 25 G/50ML
25 INJECTION, SOLUTION INTRAVENOUS
Status: DISCONTINUED | OUTPATIENT
Start: 2018-10-14 | End: 2018-10-23 | Stop reason: HOSPADM

## 2018-10-14 RX ORDER — CETIRIZINE HYDROCHLORIDE 10 MG/1
10 TABLET ORAL DAILY
Status: CANCELLED | OUTPATIENT
Start: 2018-10-14

## 2018-10-14 RX ORDER — SODIUM CHLORIDE 0.9 % (FLUSH) 0.9 %
3-10 SYRINGE (ML) INJECTION AS NEEDED
Status: DISCONTINUED | OUTPATIENT
Start: 2018-10-14 | End: 2018-10-23 | Stop reason: HOSPADM

## 2018-10-14 RX ORDER — SODIUM CHLORIDE 0.9 % (FLUSH) 0.9 %
3 SYRINGE (ML) INJECTION EVERY 12 HOURS SCHEDULED
Status: DISCONTINUED | OUTPATIENT
Start: 2018-10-14 | End: 2018-10-23 | Stop reason: HOSPADM

## 2018-10-14 RX ORDER — HYDRALAZINE HYDROCHLORIDE 25 MG/1
100 TABLET, FILM COATED ORAL 3 TIMES DAILY
Status: CANCELLED | OUTPATIENT
Start: 2018-10-14

## 2018-10-14 RX ORDER — ATORVASTATIN CALCIUM 10 MG/1
10 TABLET, FILM COATED ORAL NIGHTLY
Status: CANCELLED | OUTPATIENT
Start: 2018-10-14

## 2018-10-14 RX ORDER — BUDESONIDE AND FORMOTEROL FUMARATE DIHYDRATE 160; 4.5 UG/1; UG/1
2 AEROSOL RESPIRATORY (INHALATION)
Status: CANCELLED | OUTPATIENT
Start: 2018-10-14

## 2018-10-14 RX ORDER — ISOSORBIDE MONONITRATE 30 MG/1
30 TABLET, EXTENDED RELEASE ORAL DAILY
Status: DISCONTINUED | OUTPATIENT
Start: 2018-10-15 | End: 2018-10-23 | Stop reason: HOSPADM

## 2018-10-14 RX ORDER — SODIUM CHLORIDE 0.9 % (FLUSH) 0.9 %
3-10 SYRINGE (ML) INJECTION AS NEEDED
Status: DISCONTINUED | OUTPATIENT
Start: 2018-10-14 | End: 2018-10-14 | Stop reason: SDUPTHER

## 2018-10-14 RX ORDER — CARVEDILOL 12.5 MG/1
25 TABLET ORAL 2 TIMES DAILY WITH MEALS
Status: CANCELLED | OUTPATIENT
Start: 2018-10-14

## 2018-10-14 RX ORDER — DEXTROSE MONOHYDRATE 25 G/50ML
25 INJECTION, SOLUTION INTRAVENOUS
Status: CANCELLED | OUTPATIENT
Start: 2018-10-14

## 2018-10-14 RX ORDER — AMLODIPINE BESYLATE 10 MG/1
10 TABLET ORAL DAILY
Status: CANCELLED | OUTPATIENT
Start: 2018-10-14

## 2018-10-14 RX ORDER — HEPARIN SODIUM 5000 [USP'U]/ML
5000 INJECTION, SOLUTION INTRAVENOUS; SUBCUTANEOUS EVERY 12 HOURS SCHEDULED
Status: CANCELLED | OUTPATIENT
Start: 2018-10-14

## 2018-10-14 RX ORDER — AMLODIPINE BESYLATE 10 MG/1
10 TABLET ORAL NIGHTLY
COMMUNITY
End: 2019-04-30 | Stop reason: HOSPADM

## 2018-10-14 RX ORDER — SODIUM CHLORIDE 9 MG/ML
50 INJECTION, SOLUTION INTRAVENOUS CONTINUOUS
Status: DISCONTINUED | OUTPATIENT
Start: 2018-10-14 | End: 2018-10-14

## 2018-10-14 RX ORDER — PANTOPRAZOLE SODIUM 40 MG/1
40 TABLET, DELAYED RELEASE ORAL
Status: DISCONTINUED | OUTPATIENT
Start: 2018-10-15 | End: 2018-10-23 | Stop reason: HOSPADM

## 2018-10-14 RX ORDER — AMLODIPINE BESYLATE 10 MG/1
10 TABLET ORAL DAILY
Status: DISCONTINUED | OUTPATIENT
Start: 2018-10-15 | End: 2018-10-23 | Stop reason: HOSPADM

## 2018-10-14 RX ORDER — ISOSORBIDE MONONITRATE 30 MG/1
30 TABLET, EXTENDED RELEASE ORAL DAILY
Status: CANCELLED | OUTPATIENT
Start: 2018-10-14

## 2018-10-14 RX ORDER — ACETAMINOPHEN 325 MG/1
650 TABLET ORAL EVERY 4 HOURS PRN
Status: DISCONTINUED | OUTPATIENT
Start: 2018-10-14 | End: 2018-10-23 | Stop reason: HOSPADM

## 2018-10-14 RX ORDER — HYDRALAZINE HYDROCHLORIDE 50 MG/1
100 TABLET, FILM COATED ORAL 3 TIMES DAILY
Status: DISCONTINUED | OUTPATIENT
Start: 2018-10-15 | End: 2018-10-23 | Stop reason: HOSPADM

## 2018-10-14 RX ORDER — ONDANSETRON 4 MG/1
4 TABLET, FILM COATED ORAL EVERY 6 HOURS PRN
Status: DISCONTINUED | OUTPATIENT
Start: 2018-10-14 | End: 2018-10-23 | Stop reason: HOSPADM

## 2018-10-14 RX ORDER — SODIUM CHLORIDE 0.9 % (FLUSH) 0.9 %
3 SYRINGE (ML) INJECTION EVERY 12 HOURS SCHEDULED
Status: DISCONTINUED | OUTPATIENT
Start: 2018-10-14 | End: 2018-10-14 | Stop reason: SDUPTHER

## 2018-10-14 RX ORDER — ONDANSETRON 2 MG/ML
4 INJECTION INTRAMUSCULAR; INTRAVENOUS EVERY 6 HOURS PRN
Status: DISCONTINUED | OUTPATIENT
Start: 2018-10-14 | End: 2018-10-23 | Stop reason: HOSPADM

## 2018-10-14 RX ORDER — NICOTINE POLACRILEX 4 MG
15 LOZENGE BUCCAL
Status: CANCELLED | OUTPATIENT
Start: 2018-10-14

## 2018-10-14 RX ORDER — ATORVASTATIN CALCIUM 10 MG/1
10 TABLET, FILM COATED ORAL NIGHTLY
Status: DISCONTINUED | OUTPATIENT
Start: 2018-10-14 | End: 2018-10-23 | Stop reason: HOSPADM

## 2018-10-14 RX ORDER — GLIPIZIDE 5 MG/1
5 TABLET, FILM COATED, EXTENDED RELEASE ORAL DAILY
COMMUNITY
End: 2018-10-23 | Stop reason: HOSPADM

## 2018-10-14 RX ORDER — FERROUS SULFATE 325(65) MG
325 TABLET ORAL DAILY
Status: CANCELLED | OUTPATIENT
Start: 2018-10-14

## 2018-10-14 RX ADMIN — HEPARIN SODIUM 5000 UNITS: 5000 INJECTION, SOLUTION INTRAVENOUS; SUBCUTANEOUS at 21:47

## 2018-10-14 RX ADMIN — BUDESONIDE AND FORMOTEROL FUMARATE DIHYDRATE 2 PUFF: 160; 4.5 AEROSOL RESPIRATORY (INHALATION) at 21:33

## 2018-10-14 RX ADMIN — Medication 250 MG: at 21:38

## 2018-10-14 RX ADMIN — ERTAPENEM SODIUM 1 G: 1 INJECTION, POWDER, LYOPHILIZED, FOR SOLUTION INTRAMUSCULAR; INTRAVENOUS at 16:31

## 2018-10-14 RX ADMIN — SODIUM CHLORIDE 50 ML/HR: 9 INJECTION, SOLUTION INTRAVENOUS at 18:57

## 2018-10-14 RX ADMIN — SODIUM CHLORIDE 500 ML: 9 INJECTION, SOLUTION INTRAVENOUS at 15:45

## 2018-10-14 NOTE — H&P
[facility] Medicine Services  HISTORY AND PHYSICAL    Patient Name: Joy Bueno  : 1950  MRN: [id number]  Primary Care Physician: Provider, No Known  Date of admission: 10/14/2018      Subjective   Subjective     Chief Complaint:  Lethargy    HPI:  Joy Bueno is a 68 y.o. female resident of [facility] with PMH of CK D, DM, HTN, DHF, and anemia who was brought to ED by EMS for lethargy.  SNF staff indicate that the patient has had progressive worsening of confusion and become more lethargic over the last 5 days at the facility with decreased oral intake, and becoming very hard to arouse today.  Patient was recently hospitalized from  here at [facility] for anemia and ESBL UTI for which patient was treated with Invanz and received a blood transfusion, Procrit and started on oral iron.  In ED found to have acute cystitis with positive urinalysis and patient was started on IV Invanz.  Chest x-ray unremarkable, and CT head with no acute intracranial abnormalities.  Will be admitted hospital medicine for further evaluation and treatment.    Review of Systems   Unable to assess due to AMS    Otherwise 10-system ROS reviewed and is negative except as mentioned in the HPI.    Personal History     Past Medical History:   Diagnosis Date   • Anemia    • Anxiety    • Asthma    • Chronic kidney disease    • Diabetes mellitus (CMS/[facility])    • Diabetes mellitus, type II (CMS/[facility]) 8/3/2018   • History of Clostridium difficile infection 8/3/2018   • History of respiratory failure, since off home oxygen 8/3/2018   • History of UTI 8/3/2018   • Hypertension    • Morbid obesity (CMS/[facility])    • Osteoarthritis    • Tinea capitis 8/3/2018       Past Surgical History:   Procedure Laterality Date   •  SECTION     • COLONOSCOPY N/A 2017    Procedure: COLONOSCOPY;  Surgeon: Mark I Brunner, MD;  Location: [facility] ENDOSCOPY;  Service:    • ENDOSCOPY N/A 2017     Procedure: ESOPHAGOGASTRODUODENOSCOPY ;  Surgeon: Velasquez Call MD;  Location: [facility];  Service:    • HERNIA REPAIR         Family History: family history includes Cardiomyopathy in her mother; Diabetes in her father; Stroke in her father.     Social History:  reports that she has quit smoking. She smoked 0.25 packs per day. She does not have any smokeless tobacco history on file. She reports that she does not drink alcohol or use drugs.  Social History     Social History Narrative    Mrs. Bueno is a 68 year old AA  female. She lives alone in [place] but has been in rehab for some time. She has a daughter. She does not have an advanced directive.       Medications:  Available home medication information reviewed     Allergies   Allergen Reactions   • Geodon [Ziprasidone Hcl] Unknown (See Comments)     PT DOESN'T REMEMBER   • Haldol [Haloperidol Lactate] Unknown (See Comments)     PT DOESN'T REMEMBER   • Seroquel [Quetiapine Fumarate] Unknown (See Comments)     PT DOESN'T REMEMBER       Objective   Objective     Vital Signs:   Temp:  [97.4 °F (36.3 °C)-98.2 °F (36.8 °C)] 98.1 °F (36.7 °C)  Heart Rate:  [57-77] 69  Resp:  [17-20] 17  BP: (136-164)/(55-92) 164/63        Physical Exam   Constitutional: No acute distress, lethargic / dozing off during conversation, having to stimulate constantly to get verbal response/ drooling on self; morbidly obese  Eyes: PERRLA, sclerae anicteric, no conjunctival injection  HENT: NCAT, mucous membranes moist  Neck: Supple, no thyromegaly, no lymphadenopathy, trachea midline  Respiratory: Clear to auscultation bilaterally, nonlabored respirations   Cardiovascular: RRR, no murmurs, rubs, or gallops, palpable pedal pulses bilaterally  Gastrointestinal: Positive bowel sounds, soft, nontender, nondistended; obese  Musculoskeletal: No bilateral ankle edema, no clubbing or cyanosis to extremities  Psychiatric: cooperative when awake   Neurologic: Oriented x 3, SUTTON,  Cranial Nerves grossly intact to confrontation, speech clear  Skin: No rashes     Results Reviewed:  I have personally reviewed current lab, radiology, and data and agree.      Results from last 7 days  Lab Units 10/14/18  1329   WBC 10*3/mm3 14.20*   HEMOGLOBIN g/dL 7.0*   HEMATOCRIT % 23.1*   PLATELETS 10*3/mm3 238       Results from last 7 days  Lab Units 10/14/18  1329   SODIUM mmol/L 144   POTASSIUM mmol/L 5.9*   CHLORIDE mmol/L 119*   CO2 mmol/L 18.0*   BUN mg/dL 73*   CREATININE mg/dL 4.29*   GLUCOSE mg/dL 96   CALCIUM mg/dL 8.7   ALT (SGPT) U/L 14   AST (SGOT) U/L 15     Estimated Creatinine Clearance: 17.8 mL/min (A) (by C-G formula based on SCr of 4.29 mg/dL (H)).  Brief Urine Lab Results  (Last result in the past 365 days)      Color   Clarity   Blood   Leuk Est   Nitrite   Protein   CREAT   Urine HCG        10/14/18 1340 Yellow Turbid(A) Small (1+)(A) Large (3+)(A) Negative 100 mg/dL (2+)(A)             BNP   Date Value Ref Range Status   10/14/2018 682.0 (H) 0.0 - 100.0 pg/mL Final     Comment:     Results may be falsely decreased if patient taking Biotin.     Imaging Results (last 24 hours)     Procedure Component Value Units Date/Time    XR Chest 2 View [[id number]] Collected:  10/14/18 1443     Updated:  10/14/18 1653    Narrative:          EXAMINATION: XR CHEST 2 VW - 10/14/2018     INDICATION: Lethargy.      COMPARISON: None.     FINDINGS: Two-view chest reveals the heart to be enlarged. Underlying  chronic changes seen throughout the lung fields bilaterally. No evidence  of acute parenchymal disease. Pulmonary vascularity is pronounced. No  focal parenchymal opacification present. Degenerative change is seen  within the spine.           Impression:       Low lung volumes with chronic changes seen within the lung  fields, enlargement heart and no evidence of acute parenchymal disease.     DICTATED:   10/14/2018  EDITED/ls :   10/14/2018      This report was finalized on 10/14/2018 4:51 PM by   Rosalie Kline MD.       CT Head Without Contrast [[id number]] Collected:  10/14/18 1441     Updated:  10/14/18 1652    Narrative:       EXAMINATION: CT HEAD WO CONTRAST - 10/12/2018     INDICATION: Lethargy, weakness.     TECHNIQUE: Multiple axial CT imaging is obtained of the head from skull  base to skull vertex without the administration of intravenous contrast.     The radiation dose reduction device was turned on for each scan per the  ALARA (As Low as Reasonably Achievable) protocol.     COMPARISON: None.     FINDINGS: The parenchyma is grossly unremarkable. Some low-density area  is seen in the periventricular and subcortical white matter suggesting  chronic small vessel ischemic change. There is no hemorrhage or  hydrocephalus. No mass, mass effect or midline shift. Bony structures  reveal no evidence of osseous abnormality. There is mucosal thickening  of the left maxillary sinus and ethmoid air cells. Globes and orbits are  intact. The mastoid air cells are patent.       Impression:       No acute intracranial abnormality with chronic changes seen  within the brain.     DICTATED:   10/14/2018  EDITED/ls :   10/14/2018      This report was finalized on 10/14/2018 4:50 PM by Dr. Rosalie Kline MD.           Results for orders placed during the hospital encounter of 09/02/17   Adult Transthoracic Echo Complete    Narrative · Left ventricular wall thickness is consistent with mild concentric   hypertrophy.  · Left atrial cavity size is mildly dilated.  · Mild mitral valve regurgitation is present  · calcification of the aortic valve  · Mild tricuspid valve regurgitation is present.          Assessment/Plan   Assessment / Plan     Active Hospital Problems    Diagnosis   • Hyperkalemia   • Metabolic encephalopathy   • Anemia of chronic renal failure, stage 5 (CMS/[facility])   • Diabetes mellitus, type II (CMS/[facility])   • CKD (chronic kidney disease) stage 4, GFR 15-29 ml/min (CMS/[facility])   • Chronic heart failure  (CMS/[facility])   • UTI (urinary tract infection)   • Leukocytosis   • Hypertension         Assessment & Plan:  SEVERE SEPSIS FROM UTI WITH METABOLIC & RESP ACIDOSIS, ENCEPHALOPATHY AND NATALIO ON CKD, AS WELL AS WORSENING CHRONIC ANEMIA AND POSS MILD DECOMPENSATED DHF. GIVEN ADVANCED AGE AND COMPLEX MEDICAL HISTORY, PT IS AT RISK FOR COMPLICATIONS. SHE HAS HISTORY OF PRIOR C DIFF INFECTION AND ESBL UTI. SHE HAS NO DIARRHEA AT THIS TIME. SHE'S BEEN STARTED ON INVANCE IN THE ED AND CULTURES ARE IN PROGRESS. WILL CONT WITH CURRENT ABX TO COVER FOR ESBL. WILL TRANSFUSE 1 UNIT PRBC AND DIURESE AFTER. CHECK FOBT AND ANEMIA W/U AGAIN, PRIOR EGD AND C'SCOPE NEG. CONSULT NEPHROLOGY AND ID IN AM, POSS PROGRESSION TO ESRD. NEBS AND OXYGEN PRN. PT NONCOMPLIANT WITH CPAP. MILD HYPERKALEMIA WITH NO SIGNIFICANT EKG CHANGES, HOPEFULLY RESOLVE WITH ABX/BLOOD. WILL MONITOR. PT/OT/CM TO SEE FOR DC PLANNING/CHRONIC PHYSICAL DECONDITIONING.    Lethargy  --Likely could be multifactorial, given patient's chronic renal failure with acute renal insufficiency, possible undiagnosed sleep apnea, probable UTI  --CT head unremarkable, chest x-ray unremarkable  --Will obtain ABG, with visible signs of apnea on assessment we'll order CPAP      UTI  --Recent history of ESBL Klebsiella UTI in August 2018 treated with Invanz  --Continue Invanz at this time and culture urine    AK I on CK D  --Likely due to dehydration, nephrotoxic meds  --Will treat with gentle IV fluid hydration and monitor renal function  --Nephrology consultation in a.m.  --Labs in a.m.    Leukocytosis  --Likely due to UTI  --Continue to monitor Will obtain CBC in a.m.    Hyperkalemia  --Likely due to renal insufficiency and potassium supplementation daily  --hold daily potassium supplements and repeat BMP in a.m. to reevaluate potassium    DHF  -- Stable, continue medical management    Hypertension  --Continue home meds  --Hold nephrotoxic meds    Diabetes  --Hold oral diabetic  medications will start on low-dose sliding scale insulin and adjust as needed  --Accu-Cheks     Anemia  --H&H appears stable at 7.0/ 23.1  --Has decreased from 8.0 and 25.3 since previous transfusion in August 2018 without any overt signs or symptoms of blood loss  --Previous workup was unrevealing as anemia was still of unknown etiology possibly likely due to chronic disease  --Continue iron supplement      DVT prophylaxis: Hep    CODE STATUS:    Code Status and Medical Interventions:   Ordered at: 10/14/18 1942     Level Of Support Discussed With:    Patient     Code Status:    CPR     Medical Interventions (Level of Support Prior to Arrest):    Full       Admission Status:  I believe this patient meets INPATIENT status due to the need for care which can only be reasonably provided in an hospital setting such as aggressive/expedited ancillary services and/or consultation services, the necessity for IV medications, close physician monitoring and/or the possible need for procedures.  In such, I feel patient’s risk for adverse outcomes and need for care warrant INPATIENT evaluation and predict the patient’s care encounter to likely last beyond 2 midnights.         Electronically signed by SUELLEN Reyes, 10/14/18, 5:25 PM.        Brief Attending Admission Attestation     I have seen and examined the patient, performing an independent face-to-face diagnostic evaluation with plan of care reviewed and developed with the advanced practice clinician (APC).      Brief Summary Statement/HPI:   Joy Bueno is a 68 y.o. female who resides at [facility], brought in by EMS for evaluation of AMS/somnolence, which had been progressive over the last several days. She has complex history that includes morbid obesity, OAS (noncompliant with CPAP), CKD4, DHF, DM2, HTN, asthma, chronic anemia-likely from chronic disease, prior ESBL Klebsiella UTI, and remote C diff infection. Pt was very lethargic on arrival, but by  the time I saw her, she was alert and eating. She reports no hematochezia or melena. No diarrhea. No dysuria. No fever or chills. Family at bedside.    W/U in ED revealed worsening anemia, NATALIO on CKD4, UTI, and hyperkalemia. BNP was also elevated. ABG revealed signs of metabolic and resp acidosis.      Attending Physical Exam:  General Assessment: No acute cardiopulmonary distress. Well developed and well nourished. Mildly toxic.    HEENT: NCAT, PERRL, MM moist    Neck: Supple    CVS: RRR, S1S2 normal    Resp: Decreased BS bilat, + coarse crackles bilat, no wheezing, resp non-labored.    Abd: soft, NT, ND, normal BS, no guarding or peritoneal signs    Ext: No edema, both calves are symmetric and NTTP    Neuro: Alert, face symmetric, speech clear, SUTTON    Skin: W/D/I. No rash.    Psych: Affect is appropriate      Brief Assessment/Plan :  See above for further detailed assessment and plan developed with APC which I have reviewed and/or edited.      Electronically signed by Matthew Ackerman MD, 10/14/18, 7:42 PM.

## 2018-10-14 NOTE — PLAN OF CARE
Problem: Patient Care Overview  Goal: Plan of Care Review  Outcome: Ongoing (interventions implemented as appropriate)   10/14/18 1800   Coping/Psychosocial   Plan of Care Reviewed With patient;daughter   Plan of Care Review   Progress no change   OTHER   Outcome Summary Pt from ED, living in [facility], abnormal labs     Goal: Individualization and Mutuality  Outcome: Ongoing (interventions implemented as appropriate)   10/14/18 1800   Individualization   Patient Specific Goals (Include Timeframe) No falls or injury   Patient Specific Interventions Call light within reach, bed/chair alarm activated,      Goal: Interprofessional Rounds/Family Conf  Outcome: Ongoing (interventions implemented as appropriate)   10/14/18 1800   Interdisciplinary Rounds/Family Conf   Participants nursing;patient       Problem: Fall Risk (Adult)  Goal: Identify Related Risk Factors and Signs and Symptoms  Outcome: Ongoing (interventions implemented as appropriate)   10/14/18 1800   Fall Risk (Adult)   Related Risk Factors (Fall Risk) environment unfamiliar   Signs and Symptoms (Fall Risk) presence of risk factors     Goal: Absence of Fall  Outcome: Ongoing (interventions implemented as appropriate)   10/14/18 1800   Fall Risk (Adult)   Absence of Fall making progress toward outcome       Problem: Skin Injury Risk (Adult)  Goal: Identify Related Risk Factors and Signs and Symptoms  Outcome: Ongoing (interventions implemented as appropriate)   10/14/18 1800   Skin Injury Risk (Adult)   Related Risk Factors (Skin Injury Risk) mobility impaired     Goal: Skin Health and Integrity  Outcome: Ongoing (interventions implemented as appropriate)   10/14/18 1800   Skin Injury Risk (Adult)   Skin Health and Integrity making progress toward outcome

## 2018-10-14 NOTE — ED PROVIDER NOTES
Subjective   Patient was sent via EMS back [facility] for increasing lethargy.  The patient is arousable she states she's been more tired over the past 4-5 days.  She's had no nausea no vomiting no blood in her stools.  She states she has a history of chronic anemia.  Patient reports that she's had to be transfused a couple times with the last Sunday sent her over here she did not meet criteria and did not need to be transfused.  Patient's history is somewhat limited by her lethargy.  She does deny nausea or vomiting associated with this.  Patient has no other complaints        History provided by:  Patient, EMS personnel and nursing home  History limited by:  Mental status change   used: No    Weakness - Generalized   Severity:  Moderate  Onset quality:  Gradual  Duration:  5 days  Timing:  Constant  Progression:  Worsening  Chronicity:  Recurrent  Context comment:  Patient is a nursing home patient.  Relieved by:  Nothing  Worsened by:  Nothing  Ineffective treatments:  None tried  Associated symptoms: no abdominal pain, no cough, no diarrhea, no fever, no frequency, no nausea, no seizures, no urgency and no vomiting    Risk factors: anemia, congestive heart failure and diabetes        Review of Systems   Unable to perform ROS: Mental status change   Constitutional: Negative for fever.   Respiratory: Negative for cough.    Gastrointestinal: Negative for abdominal pain, diarrhea, nausea and vomiting.   Genitourinary: Negative for frequency and urgency.   Neurological: Negative for seizures.       Past Medical History:   Diagnosis Date   • Anemia    • Anxiety    • Asthma    • Chronic kidney disease    • Diabetes mellitus (CMS/[facility])    • Diabetes mellitus, type II (CMS/[facility]) 8/3/2018   • History of Clostridium difficile infection 8/3/2018   • History of respiratory failure, since off home oxygen 8/3/2018   • History of UTI 8/3/2018   • Hypertension    • Morbid obesity (CMS/[facility])    •  Osteoarthritis    • Tinea capitis 8/3/2018       Allergies   Allergen Reactions   • Geodon [Ziprasidone Hcl] Unknown (See Comments)     PT DOESN'T REMEMBER   • Haldol [Haloperidol Lactate] Unknown (See Comments)     PT DOESN'T REMEMBER   • Seroquel [Quetiapine Fumarate] Unknown (See Comments)     PT DOESN'T REMEMBER       Past Surgical History:   Procedure Laterality Date   •  SECTION     • COLONOSCOPY N/A 2017    Procedure: COLONOSCOPY;  Surgeon: Mark I Brunner, MD;  Location:  [facility];  Service:    • ENDOSCOPY N/A 2017    Procedure: ESOPHAGOGASTRODUODENOSCOPY ;  Surgeon: Velasquez Call MD;  Location:  [facility];  Service:    • HERNIA REPAIR         Family History   Problem Relation Age of Onset   • Cardiomyopathy Mother    • Stroke Father    • Diabetes Father        Social History     Social History   • Marital status:      Social History Main Topics   • Smoking status: Former Smoker     Packs/day: 0.25      Comment: unknown when quit, maybe last year   • Alcohol use No   • Drug use: No   • Sexual activity: No     Other Topics Concern   • Not on file     Social History Narrative    Mrs. Bueno is a 68 year old AA  female. She lives alone in [place] but has been in rehab for some time. She has a daughter. She does not have an advanced directive.           Objective   Physical Exam   Constitutional: She is oriented to person, place, and time.   Morbidly obese Lethargic but arouses to voice and to mild physical stimulation.  She knew person place and time.  She Also sleep during the history taking.   HENT:   Head: Normocephalic and atraumatic.   Nose: Nose normal.   Mouth/Throat: Oropharynx is clear and moist.   Eyes: Conjunctivae are normal. No scleral icterus.   Neck: Normal range of motion. No thyromegaly present.   Cardiovascular: Normal rate, regular rhythm and normal heart sounds.  Exam reveals no gallop and no friction rub.    No murmur  heard.  Pulmonary/Chest: Effort normal and breath sounds normal. No respiratory distress. She has no wheezes. She has no rales. She exhibits no tenderness.   Abdominal: Soft. Bowel sounds are normal. She exhibits no distension. There is no tenderness. There is no rebound and no guarding. No hernia.   Musculoskeletal: Normal range of motion.   Lymphadenopathy:     She has no cervical adenopathy.   Neurological: She is alert and oriented to person, place, and time. She has normal reflexes. She displays normal reflexes. No cranial nerve deficit. Coordination normal.   Skin: Skin is warm and dry. Capillary refill takes less than 2 seconds.   Psychiatric: Judgment and thought content normal.   Lethargic   Nursing note and vitals reviewed.      Procedures           ED Course  ED Course as of Oct 16 2316   Sun Oct 14, 2018   1556 Check on the patient a couple times with some she's deep asleep to be aroused she's drooling that she is able to wake up and speak.  Advised her the positive UTI been admitted to the hospital she has slight progression of her anemia.  I spoke to Dr. Noe he is agreed to admit the patient.  [MITALI]   1557 Patient was started on ertapenem due to her.  History of ESBL this back in August.  [MITALI]      ED Course User Index  [MITALI] Raheel Brunson PA                  MDM  Number of Diagnoses or Management Options  Acute renal failure superimposed on chronic kidney disease, unspecified CKD stage, unspecified acute renal failure type (CMS/HCC): new and requires workup  Acute UTI: new and requires workup  Anemia of chronic disease: established and worsening  History of ESBL Klebsiella pneumoniae infection: new and requires workup     Amount and/or Complexity of Data Reviewed  Clinical lab tests: reviewed and ordered  Tests in the radiology section of CPT®: reviewed and ordered  Tests in the medicine section of CPT®: ordered and reviewed  Decide to obtain previous medical records or to obtain history from  someone other than the patient: yes  Discuss the patient with other providers: yes    Patient Progress  Patient progress: stable        Final diagnoses:   Acute UTI   History of ESBL Klebsiella pneumoniae infection   Acute renal failure superimposed on chronic kidney disease, unspecified CKD stage, unspecified acute renal failure type (CMS/[facility])   Anemia of chronic disease            Raheel Brunson PA  10/16/18 3956

## 2018-10-15 LAB
ABO + RH BLD: NORMAL
ANION GAP SERPL CALCULATED.3IONS-SCNC: 9 MMOL/L (ref 3–11)
BASOPHILS # BLD MANUAL: 0 10*3/MM3 (ref 0–0.2)
BASOPHILS NFR BLD AUTO: 0 % (ref 0–1)
BH BB BLOOD EXPIRATION DATE: NORMAL
BH BB BLOOD TYPE BARCODE: 7300
BH BB DISPENSE STATUS: NORMAL
BH BB PRODUCT CODE: NORMAL
BH BB UNIT NUMBER: NORMAL
BUN BLD-MCNC: 77 MG/DL (ref 9–23)
BUN/CREAT SERPL: 17.8 (ref 7–25)
CALCIUM SPEC-SCNC: 8.2 MG/DL (ref 8.7–10.4)
CHLORIDE SERPL-SCNC: 115 MMOL/L (ref 99–109)
CO2 SERPL-SCNC: 20 MMOL/L (ref 20–31)
CREAT BLD-MCNC: 4.33 MG/DL (ref 0.6–1.3)
CYTOLOGIST CVX/VAG CYTO: NORMAL
DEPRECATED RDW RBC AUTO: 62.2 FL (ref 37–54)
EOSINOPHIL # BLD MANUAL: 0.35 10*3/MM3 (ref 0.1–0.3)
EOSINOPHIL NFR BLD MANUAL: 3 % (ref 0–3)
ERYTHROCYTE [DISTWIDTH] IN BLOOD BY AUTOMATED COUNT: 17.1 % (ref 11.3–14.5)
FERRITIN SERPL-MCNC: 804 NG/ML (ref 10–291)
FOLATE SERPL-MCNC: 17.22 NG/ML (ref 3.2–20)
GFR SERPL CREATININE-BSD FRML MDRD: 12 ML/MIN/1.73
GLUCOSE BLD-MCNC: 82 MG/DL (ref 70–100)
GLUCOSE BLDC GLUCOMTR-MCNC: 120 MG/DL (ref 70–130)
GLUCOSE BLDC GLUCOMTR-MCNC: 202 MG/DL (ref 70–130)
GLUCOSE BLDC GLUCOMTR-MCNC: 216 MG/DL (ref 70–130)
GLUCOSE BLDC GLUCOMTR-MCNC: 91 MG/DL (ref 70–130)
GLUCOSE BLDC GLUCOMTR-MCNC: 95 MG/DL (ref 70–130)
HBA1C MFR BLD: 5.4 % (ref 4.8–5.6)
HCT VFR BLD AUTO: 23.8 % (ref 34.5–44)
HGB BLD-MCNC: 5.1 G/DL (ref 11.5–15.5)
IRON 24H UR-MRATE: 91 MCG/DL (ref 50–175)
IRON SATN MFR SERPL: 54 % (ref 15–50)
LYMPHOCYTES # BLD MANUAL: 0.83 10*3/MM3 (ref 0.6–4.8)
LYMPHOCYTES NFR BLD MANUAL: 7 % (ref 24–44)
LYMPHOCYTES NFR BLD MANUAL: 8 % (ref 0–12)
MAGNESIUM SERPL-MCNC: 1.3 MG/DL (ref 1.3–2.7)
MCH RBC QN AUTO: 21.5 PG (ref 27–31)
MCHC RBC AUTO-ENTMCNC: 21.4 G/DL (ref 32–36)
MCV RBC AUTO: 100.4 FL (ref 80–99)
MONOCYTES # BLD AUTO: 0.94 10*3/MM3 (ref 0–1)
NEUTROPHILS # BLD AUTO: 9.67 10*3/MM3 (ref 1.5–8.3)
NEUTROPHILS NFR BLD MANUAL: 72 % (ref 41–71)
NEUTS BAND NFR BLD MANUAL: 10 % (ref 0–5)
PATH INTERP BLD-IMP: NORMAL
PLAT MORPH BLD: NORMAL
PLATELET # BLD AUTO: 194 10*3/MM3 (ref 150–450)
PMV BLD AUTO: 10.8 FL (ref 6–12)
POTASSIUM BLD-SCNC: 5.8 MMOL/L (ref 3.5–5.5)
RBC # BLD AUTO: 2.37 10*6/MM3 (ref 3.89–5.14)
RETICS/RBC NFR AUTO: 4.36 % (ref 0.5–1.5)
ROULEAUX BLD QL SMEAR: ABNORMAL
SODIUM BLD-SCNC: 144 MMOL/L (ref 132–146)
TIBC SERPL-MCNC: 168 MCG/DL (ref 250–450)
UNIT  ABO: NORMAL
UNIT  RH: NORMAL
VIT B12 BLD-MCNC: 918 PG/ML (ref 211–911)
WBC NRBC COR # BLD: 11.79 10*3/MM3 (ref 3.5–10.8)
WBC OBSERVATIONS: ABNORMAL

## 2018-10-15 PROCEDURE — 94799 UNLISTED PULMONARY SVC/PX: CPT

## 2018-10-15 PROCEDURE — 80048 BASIC METABOLIC PNL TOTAL CA: CPT | Performed by: NURSE PRACTITIONER

## 2018-10-15 PROCEDURE — 85045 AUTOMATED RETICULOCYTE COUNT: CPT | Performed by: HOSPITALIST

## 2018-10-15 PROCEDURE — 83540 ASSAY OF IRON: CPT | Performed by: HOSPITALIST

## 2018-10-15 PROCEDURE — 85060 BLOOD SMEAR INTERPRETATION: CPT | Performed by: NURSE PRACTITIONER

## 2018-10-15 PROCEDURE — 83735 ASSAY OF MAGNESIUM: CPT | Performed by: NURSE PRACTITIONER

## 2018-10-15 PROCEDURE — 63510000001 EPOETIN ALFA PER 1000 UNITS: Performed by: INTERNAL MEDICINE

## 2018-10-15 PROCEDURE — 82746 ASSAY OF FOLIC ACID SERUM: CPT | Performed by: HOSPITALIST

## 2018-10-15 PROCEDURE — 63710000001 INSULIN LISPRO (HUMAN) PER 5 UNITS: Performed by: NURSE PRACTITIONER

## 2018-10-15 PROCEDURE — 82607 VITAMIN B-12: CPT | Performed by: HOSPITALIST

## 2018-10-15 PROCEDURE — 83550 IRON BINDING TEST: CPT | Performed by: HOSPITALIST

## 2018-10-15 PROCEDURE — P9016 RBC LEUKOCYTES REDUCED: HCPCS

## 2018-10-15 PROCEDURE — 86900 BLOOD TYPING SEROLOGIC ABO: CPT

## 2018-10-15 PROCEDURE — 85025 COMPLETE CBC W/AUTO DIFF WBC: CPT | Performed by: NURSE PRACTITIONER

## 2018-10-15 PROCEDURE — 97162 PT EVAL MOD COMPLEX 30 MIN: CPT

## 2018-10-15 PROCEDURE — 85007 BL SMEAR W/DIFF WBC COUNT: CPT | Performed by: NURSE PRACTITIONER

## 2018-10-15 PROCEDURE — 82962 GLUCOSE BLOOD TEST: CPT

## 2018-10-15 PROCEDURE — 99233 SBSQ HOSP IP/OBS HIGH 50: CPT | Performed by: INTERNAL MEDICINE

## 2018-10-15 PROCEDURE — 25010000002 ERTAPENEM PER 500 MG: Performed by: INTERNAL MEDICINE

## 2018-10-15 PROCEDURE — 25010000002 HEPARIN (PORCINE) PER 1000 UNITS: Performed by: NURSE PRACTITIONER

## 2018-10-15 PROCEDURE — 36430 TRANSFUSION BLD/BLD COMPNT: CPT

## 2018-10-15 PROCEDURE — 94640 AIRWAY INHALATION TREATMENT: CPT

## 2018-10-15 PROCEDURE — 25010000002 FUROSEMIDE PER 20 MG: Performed by: HOSPITALIST

## 2018-10-15 PROCEDURE — 83036 HEMOGLOBIN GLYCOSYLATED A1C: CPT | Performed by: NURSE PRACTITIONER

## 2018-10-15 PROCEDURE — 25010000002 MAGNESIUM SULFATE 2 GM/50ML SOLUTION: Performed by: INTERNAL MEDICINE

## 2018-10-15 PROCEDURE — 94760 N-INVAS EAR/PLS OXIMETRY 1: CPT

## 2018-10-15 PROCEDURE — 82728 ASSAY OF FERRITIN: CPT | Performed by: HOSPITALIST

## 2018-10-15 RX ORDER — SODIUM POLYSTYRENE SULFONATE 15 G/60ML
30 SUSPENSION ORAL; RECTAL ONCE
Status: DISCONTINUED | OUTPATIENT
Start: 2018-10-15 | End: 2018-10-15

## 2018-10-15 RX ORDER — SODIUM BICARBONATE 650 MG/1
1300 TABLET ORAL 2 TIMES DAILY
Status: DISCONTINUED | OUTPATIENT
Start: 2018-10-15 | End: 2018-10-23 | Stop reason: HOSPADM

## 2018-10-15 RX ORDER — MAGNESIUM SULFATE HEPTAHYDRATE 40 MG/ML
2 INJECTION, SOLUTION INTRAVENOUS ONCE
Status: COMPLETED | OUTPATIENT
Start: 2018-10-15 | End: 2018-10-15

## 2018-10-15 RX ADMIN — HEPARIN SODIUM 5000 UNITS: 5000 INJECTION, SOLUTION INTRAVENOUS; SUBCUTANEOUS at 20:00

## 2018-10-15 RX ADMIN — ERTAPENEM SODIUM 500 MG: 1 INJECTION, POWDER, LYOPHILIZED, FOR SOLUTION INTRAMUSCULAR; INTRAVENOUS at 18:08

## 2018-10-15 RX ADMIN — HYDRALAZINE HYDROCHLORIDE 100 MG: 50 TABLET, FILM COATED ORAL at 16:48

## 2018-10-15 RX ADMIN — INSULIN LISPRO 3 UNITS: 100 INJECTION, SOLUTION INTRAVENOUS; SUBCUTANEOUS at 18:08

## 2018-10-15 RX ADMIN — EPOETIN ALFA 20000 UNITS: 20000 SOLUTION INTRAVENOUS; SUBCUTANEOUS at 16:49

## 2018-10-15 RX ADMIN — BUDESONIDE AND FORMOTEROL FUMARATE DIHYDRATE 2 PUFF: 160; 4.5 AEROSOL RESPIRATORY (INHALATION) at 21:16

## 2018-10-15 RX ADMIN — FUROSEMIDE 40 MG: 10 INJECTION, SOLUTION INTRAMUSCULAR; INTRAVENOUS at 01:30

## 2018-10-15 RX ADMIN — ACETAMINOPHEN 650 MG: 325 TABLET ORAL at 19:58

## 2018-10-15 RX ADMIN — CARVEDILOL 25 MG: 12.5 TABLET, FILM COATED ORAL at 18:08

## 2018-10-15 RX ADMIN — BUDESONIDE AND FORMOTEROL FUMARATE DIHYDRATE 2 PUFF: 160; 4.5 AEROSOL RESPIRATORY (INHALATION) at 09:07

## 2018-10-15 RX ADMIN — Medication 3 ML: at 19:55

## 2018-10-15 RX ADMIN — Medication 3 ML: at 08:06

## 2018-10-15 RX ADMIN — SODIUM BICARBONATE 650 MG TABLET 1300 MG: at 19:56

## 2018-10-15 RX ADMIN — HYDRALAZINE HYDROCHLORIDE 100 MG: 50 TABLET, FILM COATED ORAL at 19:56

## 2018-10-15 RX ADMIN — ATORVASTATIN CALCIUM 10 MG: 10 TABLET, FILM COATED ORAL at 19:56

## 2018-10-15 RX ADMIN — HEPARIN SODIUM 5000 UNITS: 5000 INJECTION, SOLUTION INTRAVENOUS; SUBCUTANEOUS at 08:07

## 2018-10-15 RX ADMIN — Medication 250 MG: at 19:56

## 2018-10-15 RX ADMIN — MAGNESIUM SULFATE IN WATER 2 G: 40 INJECTION, SOLUTION INTRAVENOUS at 14:35

## 2018-10-15 RX ADMIN — INSULIN LISPRO 3 UNITS: 100 INJECTION, SOLUTION INTRAVENOUS; SUBCUTANEOUS at 19:58

## 2018-10-15 NOTE — PROGRESS NOTES
[facility] Medicine Services  PROGRESS NOTE    Patient Name: Joy Bueno  : 1950  MRN: [id number]    Date of Admission: 10/14/2018  Length of Stay: 1  Primary Care Physician: Provider, No Known    Subjective   Subjective     CC: f/u sepsis    HPI:Patient remains lethargic, arouses easily but drifts back to sleep. Does endorse some chills.    Review of Systems  UTO complete ros sec to mental status    Objective   Objective     Vital Signs:   Temp:  [96.2 °F (35.7 °C)-98.2 °F (36.8 °C)] 96.2 °F (35.7 °C)  Heart Rate:  [50-77] 68  Resp:  [15-22] 15  BP: (136-164)/(55-96) 154/65  FiO2 (%):  [24 %] 24 %        Physical Exam:  Constitutional: No acute distress, lethargic  HENT: NCAT, mucous membranes moist  Respiratory: Clear to auscultation bilaterally, respiratory effort normal   Cardiovascular: RRR, no murmurs, rubs, or gallops, palpable pedal pulses bilaterally  Gastrointestinal: Positive bowel sounds, soft, nontender, nondistended  Musculoskeletal: No bilateral ankle edema  Psychiatric: flat affect, cooperative  Neurologic: EDWIN  Skin: No rashes    Results Reviewed:  I have personally reviewed current lab, radiology, and data and agree.      Results from last 7 days  Lab Units 10/15/18  0633 10/14/18  1329   WBC 10*3/mm3 11.79* 14.20*   HEMOGLOBIN g/dL 5.1* 7.0*   HEMATOCRIT % 23.8* 23.1*   PLATELETS 10*3/mm3 194 238       Results from last 7 days  Lab Units 10/15/18  0625 10/14/18  1329   SODIUM mmol/L 144 144   POTASSIUM mmol/L 5.8* 5.9*   CHLORIDE mmol/L 115* 119*   CO2 mmol/L 20.0 18.0*   BUN mg/dL 77* 73*   CREATININE mg/dL 4.33* 4.29*   GLUCOSE mg/dL 82 96   CALCIUM mg/dL 8.2* 8.7   ALT (SGPT) U/L  --  14   AST (SGOT) U/L  --  15     Estimated Creatinine Clearance: 17.6 mL/min (A) (by C-G formula based on SCr of 4.33 mg/dL (H)).  BNP   Date Value Ref Range Status   10/14/2018 682.0 (H) 0.0 - 100.0 pg/mL Final     Comment:     Results may be falsely decreased if patient  taking Biotin.       Microbiology Results Abnormal     Procedure Component Value - Date/Time    Urine Culture - Urine, [[id number]]  (Abnormal) Collected:  10/14/18 1340    Lab Status:  Preliminary result Specimen:  Urine from Urine, Catheter Updated:  10/15/18 0719     Urine Culture >100,000 CFU/mL Gram Negative Bacilli (A)    Blood Culture - Blood, [[id number]]  (Normal) Collected:  10/14/18 1606    Lab Status:  Preliminary result Specimen:  Blood from Arm, Right Updated:  10/15/18 0431     Blood Culture No growth at less than 24 hours    Blood Culture - Blood, [[id number]]  (Normal) Collected:  10/14/18 1606    Lab Status:  Preliminary result Specimen:  Blood from Arm, Left Updated:  10/15/18 0431     Blood Culture No growth at less than 24 hours          Imaging Results (last 24 hours)     Procedure Component Value Units Date/Time    XR Chest 2 View [[id number]] Collected:  10/14/18 1443     Updated:  10/14/18 1653    Narrative:          EXAMINATION: XR CHEST 2 VW - 10/14/2018     INDICATION: Lethargy.      COMPARISON: None.     FINDINGS: Two-view chest reveals the heart to be enlarged. Underlying  chronic changes seen throughout the lung fields bilaterally. No evidence  of acute parenchymal disease. Pulmonary vascularity is pronounced. No  focal parenchymal opacification present. Degenerative change is seen  within the spine.           Impression:       Low lung volumes with chronic changes seen within the lung  fields, enlargement heart and no evidence of acute parenchymal disease.     DICTATED:   10/14/2018  EDITED/ls :   10/14/2018      This report was finalized on 10/14/2018 4:51 PM by Dr. Rosalie Kline MD.       CT Head Without Contrast [[id number]] Collected:  10/14/18 1441     Updated:  10/14/18 1652    Narrative:       EXAMINATION: CT HEAD WO CONTRAST - 10/12/2018     INDICATION: Lethargy, weakness.     TECHNIQUE: Multiple axial CT imaging is obtained of the head from skull  base to skull vertex  without the administration of intravenous contrast.     The radiation dose reduction device was turned on for each scan per the  ALARA (As Low as Reasonably Achievable) protocol.     COMPARISON: None.     FINDINGS: The parenchyma is grossly unremarkable. Some low-density area  is seen in the periventricular and subcortical white matter suggesting  chronic small vessel ischemic change. There is no hemorrhage or  hydrocephalus. No mass, mass effect or midline shift. Bony structures  reveal no evidence of osseous abnormality. There is mucosal thickening  of the left maxillary sinus and ethmoid air cells. Globes and orbits are  intact. The mastoid air cells are patent.       Impression:       No acute intracranial abnormality with chronic changes seen  within the brain.     DICTATED:   10/14/2018  EDITED/ls :   10/14/2018      This report was finalized on 10/14/2018 4:50 PM by Dr. Rosalie Kline MD.           Results for orders placed during the hospital encounter of 09/02/17   Adult Transthoracic Echo Complete    Narrative · Left ventricular wall thickness is consistent with mild concentric   hypertrophy.  · Left atrial cavity size is mildly dilated.  · Mild mitral valve regurgitation is present  · calcification of the aortic valve  · Mild tricuspid valve regurgitation is present.          I have reviewed the medications.      amLODIPine 10 mg Oral Daily   atorvastatin 10 mg Oral Nightly   budesonide-formoterol 2 puff Inhalation BID - RT   carvedilol 25 mg Oral BID With Meals   ertapenem 1 g Intravenous Q24H   heparin (porcine) 5,000 Units Subcutaneous Q12H   hydrALAZINE 100 mg Oral TID   insulin lispro 0-7 Units Subcutaneous 4x Daily With Meals & Nightly   isosorbide mononitrate 30 mg Oral Daily   pantoprazole 40 mg Oral Q AM   polyethylene glycol 17 g Oral Daily   saccharomyces boulardii 250 mg Oral BID   sertraline 50 mg Oral Daily   sodium chloride 3 mL Intravenous Q12H         Assessment/Plan   Assessment /  Plan     Active Hospital Problems    Diagnosis   • Hyperkalemia   • Metabolic encephalopathy   • Severe sepsis (CMS/[facility])   • Sepsis (CMS/[facility])   • Anemia of chronic renal failure, stage 5 (CMS/[facility])   • Diabetes mellitus, type II (CMS/[facility])   • CKD (chronic kidney disease) stage 4, GFR 15-29 ml/min (CMS/[facility])   • Chronic heart failure (CMS/[facility])   • UTI (urinary tract infection)   • Leukocytosis   • Hypertension        Brief Hospital Course to date:  Joy Bueno is a 68 y.o. female past medical history of type 2 diabetes, significant stage IV, chronic heart failure, hypertension.  Presents to [facility] performed, which place with complaints for 5 days of lethargy, decreased by mouth intake, difficult to arouse.  Here patient has been admitted with sepsis and acute metabolic encephalopathy likely secondary to UTI.    Assessment & Plan:  - Severe sepsis likely secondary to UTI, continue IVF, f/u blood cultures currently on Invanz, ID has been consulted  - Acute encephalopathy, likely multifactorial sec to uremia vs sepsis  - Hx of ESBL UTI continue abx as above, f/u urine cultures  - NATALIO on CKD stage 4, with metabolic and respiratory acidosis baseline Cr unknown, suspect ATN sec to sepsis, renal consulted.  - Suspected decompensated HF with elevated BNP and hypoxia, continue O2, diuresis will be limited with her renal failure, will await renal consult  - Previously well controlled T2DM AC1 6.8% (8/3) continue ssi  - Anemia, no si/sx of acute blood loss, suspect ACD, continue to monitor,s/p 1unit PRBC transfuse if Hg<7, f/u iron studies  - Complex case    DVT Prophylaxis:  [facility]    CODE STATUS:   Code Status and Medical Interventions:   Ordered at: 10/14/18 1942     Level Of Support Discussed With:    Patient     Code Status:    CPR     Medical Interventions (Level of Support Prior to Arrest):    Full       Disposition: TBD    Electronically signed by Paula Silver MD, 10/15/18, 8:36 AM.

## 2018-10-15 NOTE — PLAN OF CARE
Problem: Patient Care Overview  Goal: Plan of Care Review  Outcome: Ongoing (interventions implemented as appropriate)   10/15/18 1312   Coping/Psychosocial   Plan of Care Reviewed With patient   OTHER   Outcome Summary PT initial evaluation completed for pt presenting with increased confusion, lethargy, decreased strength, and a decline in functional mobility. Pt required depA x2 for scooting to HOB. Pt declined any EOB activity. Pt's decreased independence warrants PT skilled care. Recommend D/C to SNF.

## 2018-10-15 NOTE — PLAN OF CARE
Problem: Patient Care Overview  Goal: Plan of Care Review  Outcome: Ongoing (interventions implemented as appropriate)   10/15/18 1512   Coping/Psychosocial   Plan of Care Reviewed With patient;daughter   Plan of Care Review   Progress no change       Problem: Fall Risk (Adult)  Goal: Identify Related Risk Factors and Signs and Symptoms  Outcome: Ongoing (interventions implemented as appropriate)   10/15/18 1512   Fall Risk (Adult)   Related Risk Factors (Fall Risk) gait/mobility problems;history of falls   Signs and Symptoms (Fall Risk) presence of risk factors       Problem: Skin Injury Risk (Adult)  Goal: Identify Related Risk Factors and Signs and Symptoms  Outcome: Ongoing (interventions implemented as appropriate)   10/15/18 1512   Skin Injury Risk (Adult)   Related Risk Factors (Skin Injury Risk) cognitive impairment;edema;infection;mobility impaired

## 2018-10-15 NOTE — THERAPY EVALUATION
Acute Care - Physical Therapy Initial Evaluation  [facility]     Patient Name: Joy Bueno  : 1950  MRN: [id number]  Today's Date: 10/15/2018   Onset of Illness/Injury or Date of Surgery: 10/14/18  Date of Referral to PT: 10/14/18  Referring Physician: MD Meka      Admit Date: 10/14/2018    Visit Dx:     ICD-10-CM ICD-9-CM   1. Acute UTI N39.0 599.0   2. History of ESBL Klebsiella pneumoniae infection Z86.19 V12.09   3. Acute renal failure superimposed on chronic kidney disease, unspecified CKD stage, unspecified acute renal failure type (CMS/HCC) N17.9 584.9    N18.9 585.9   4. Anemia of chronic disease D63.8 285.29   5. Impaired functional mobility, balance, gait, and endurance Z74.09 V49.89     Patient Active Problem List   Diagnosis   • Asthma   • Hypertension   • Symptomatic anemia   • Morbid obesity (CMS/[facility])   • Renal failure   • Anxiety   • Anemia   • CKD (chronic kidney disease) Stage 3   • Gastritis   • Pleural effusion, right   • Leukocytosis   • Possible pneumonia of RML/RLL   • Clostridium difficile diarrhea   • UTI (urinary tract infection)   • Bilateral leg pain   • Generalized weakness   • Abdominal pain   • Chronic heart failure (CMS/[facility])   • Anemia in stage 4 chronic kidney disease (CMS/HCC)   • CKD (chronic kidney disease) stage 4, GFR 15-29 ml/min (CMS/[facility])   • Diabetes mellitus, type II (CMS/[facility])   • History of UTI   • History of Clostridium difficile infection   • Tinea capitis   • History of respiratory failure, since off home oxygen   • Anemia of chronic renal failure, stage 5 (CMS/HCC)   • Hyperkalemia   • Metabolic encephalopathy   • Severe sepsis (CMS/[facility])   • Sepsis (CMS/[facility])     Past Medical History:   Diagnosis Date   • Anemia    • Anxiety    • Asthma    • Chronic kidney disease    • Diabetes mellitus (CMS/[facility])    • Diabetes mellitus, type II (CMS/HCC) 8/3/2018   • History of Clostridium difficile infection 8/3/2018   • History of respiratory failure, since off  home oxygen 8/3/2018   • History of UTI 8/3/2018   • Hypertension    • Morbid obesity (CMS/HCC)    • Osteoarthritis    • Tinea capitis 8/3/2018     Past Surgical History:   Procedure Laterality Date   •  SECTION     • COLONOSCOPY N/A 2017    Procedure: COLONOSCOPY;  Surgeon: Mark I Brunner, MD;  Location:  [facility];  Service:    • ENDOSCOPY N/A 2017    Procedure: ESOPHAGOGASTRODUODENOSCOPY ;  Surgeon: Velasquez Call MD;  Location:  [facility];  Service:    • HERNIA REPAIR          PT ASSESSMENT (last 12 hours)      Physical Therapy Evaluation     Row Name 10/15/18 1312          PT Evaluation Time/Intention    Subjective Information complains of;weakness;fatigue  -KR     Document Type evaluation  -KR     Mode of Treatment physical therapy  -KR     Patient Effort poor  -KR     Symptoms Noted During/After Treatment fatigue;shortness of breath  -KR     Comment increased lethargy throughout session  -KR     Row Name 10/15/18 1312          General Information    Patient Profile Reviewed? yes  -KR     Onset of Illness/Injury or Date of Surgery 10/14/18  -KR     Referring Physician MD Meka  -KR     Patient Observations agree to therapy;lethargic  -KR     Prior Level of Function --   pt poor historian; unable to answer  -KR     Equipment Currently Used at Home lift device;wheelchair  -KR     Pertinent History of Current Functional Problem Pt [facility] to the ED due to lethargy. Staff reports progressive worsening of increased confusion and lethargy over past five days and has been very hard to arouse. In ED pt found to have acute cystitis with positive urinalysis.   -KR     Existing Precautions/Restrictions fall;oxygen therapy device and L/min;other (see comments)   increased confusion  -KR     Risks Reviewed patient:;LOB;dizziness;increased discomfort;change in vital signs  -KR     Benefits Reviewed patient:;improve function;increase independence;increase  strength;increase balance  -KR     Barriers to Rehab medically complex;previous functional deficit;cognitive status  -KR     Row Name 10/15/18 1312          Relationship/Environment    Lives With facility resident  -KR     Row Name 10/15/18 1312          Resource/Environmental Concerns    Current Living Arrangements extended care facility  -KR     Resource/Environmental Concerns none  -KR     Row Name 10/15/18 1312          Cognitive Assessment/Interventions    Additional Documentation Cognitive Assessment/Intervention (Group)  -KR     Row Name 10/15/18 1312          Cognitive Assessment/Intervention- PT/OT    Affect/Mental Status (Cognitive) confused;low arousal/lethargic  -KR     Orientation Status (Cognition) oriented to;person  -KR     Follows Commands (Cognition) follows one step commands;50-74% accuracy;physical/tactile prompts required;repetition of directions required;verbal cues/prompting required  -KR     Cognitive Function (Cognitive) safety deficit  -KR     Safety Deficit (Cognitive) moderate deficit;ability to follow commands;awareness of need for assistance;insight into deficits/self awareness;safety precautions awareness;safety precautions follow-through/compliance  -KR     Personal Safety Interventions fall prevention program maintained;muscle strengthening facilitated;supervised activity  -KR     Row Name 10/15/18 1312          Safety Issues, Functional Mobility    Safety Issues Affecting Function (Mobility) ability to follow commands;awareness of need for assistance;insight into deficits/self awareness;safety precaution awareness;safety precautions follow-through/compliance  -KR     Impairments Affecting Function (Mobility) cognition;endurance/activity tolerance;shortness of breath;strength  -KR     Row Name 10/15/18 1312          Bed Mobility Assessment/Treatment    Bed Mobility Assessment/Treatment scooting/bridging  -KR     Scooting/Bridging [place] (Bed Mobility) dependent (less than 25%  patient effort);2 person assist;verbal cues  -KR     Bed Mobility, Safety Issues cognitive deficits limit understanding;decreased use of arms for pushing/pulling;decreased use of legs for bridging/pushing  -KR     Assistive Device (Bed Mobility) draw sheet  -KR     Comment (Bed Mobility) Pt unable to assist with scooting to HOB; pt declined any EOB activity due to increased fatigue. Pt had difficulty keeping eyes open or staying alert during session.   -     Row Name 10/15/18 1312          Transfer Assessment/Treatment    Comment (Transfers) Transfers deferred. Not appropriate to assess.   -     Row Name 10/15/18 1312          Gait/Stairs Assessment/Training    Comment (Gait/Stairs) Ambulation deferred. Not appropriate to assess. Pt reports being non-ambulatory at baseline.   -     Row Name 10/15/18 1312          General ROM    GENERAL ROM COMMENTS Kevin hip flexion and knee flexion/extension impaired 25%; kevin ankle DF WFL   -[facility] Name 10/15/18 1312          MMT (Manual Muscle Testing)    General MMT Comments Kevin ankle DF grossly 2+/5; kevin hip flex/knee ext grossly 2/5  -     Row Name 10/15/18 1312          Sensory Assessment/Intervention    Sensory General Assessment no sensation deficits identified  -     Row Name 10/15/18 1312          Pain Assessment    Additional Documentation Pain Scale: Numbers Pre/Post-Treatment (Group)  -[facility] Name 10/15/18 1312          Pain Scale: Numbers Pre/Post-Treatment    Pain Scale: Numbers, Pretreatment 0/10 - no pain  -     Pain Scale: Numbers, Post-Treatment 0/10 - no pain  -     Row Name 10/15/18 1312          Plan of Care Review    Plan of Care Reviewed With patient  -[facility] Name 10/15/18 1312          Physical Therapy Clinical Impression    Date of Referral to PT 10/14/18  -KR     PT Diagnosis (PT Clinical Impression) impaired functional mobility  -KR     Patient/Family Goals Statement (PT Clinical Impression) return to PLOF  -     Criteria for  Skilled Interventions Met (PT Clinical Impression) yes;treatment indicated  -KR     Rehab Potential (PT Clinical Summary) fair, will monitor progress closely  -KR     Care Plan Review (PT) evaluation/treatment results reviewed;care plan/treatment goals reviewed;patient/other agree to care plan  -KR     Row Name 10/15/18 1312          Vital Signs    Pre Systolic BP Rehab 131  -KR     Pre Treatment Diastolic BP 62  -KR     Pretreatment Heart Rate (beats/min) 74  -KR     Posttreatment Heart Rate (beats/min) 70  -KR     Pre SpO2 (%) 98  -KR     O2 Delivery Pre Treatment supplemental O2  -KR     Post SpO2 (%) 100  -KR     O2 Delivery Post Treatment supplemental O2  -KR     Pre Patient Position Supine  -KR     Intra Patient Position Supine  -KR     Post Patient Position Supine  -KR     Row Name 10/15/18 1312          Physical Therapy Goals    Bed Mobility Goal Selection (PT) bed mobility, PT goal 1  -KR     Transfer Goal Selection (PT) transfer, PT goal 1  -KR     Balance Goal Selection (PT) balance, PT goal 1  -KR     Additional Documentation Balance Goal Selection (PT) (Row)  -KR     Row Name 10/15/18 1312          Bed Mobility Goal 1 (PT)    Activity/Assistive Device (Bed Mobility Goal 1, PT) sit to supine;supine to sit  -KR     [place] Level/Cues Needed (Bed Mobility Goal 1, PT) moderate assist (50-74% patient effort)  -KR     Time Frame (Bed Mobility Goal 1, PT) 2 weeks  -KR     Progress/Outcomes (Bed Mobility Goal 1, PT) goal ongoing  -KR     Row Name 10/15/18 1312          Transfer Goal 1 (PT)    Activity/Assistive Device (Transfer Goal 1, PT) sit-to-stand/stand-to-sit;bed-to-chair/chair-to-bed  -KR     [place] Level/Cues Needed (Transfer Goal 1, PT) maximum assist (25-49% patient effort)  -KR     Time Frame (Transfer Goal 1, PT) 2 weeks  -KR     Progress/Outcome (Transfer Goal 1, PT) goal ongoing  -KR     Row Name 10/15/18 1312          Balance Goal 1 (PT)    Activity/Assistive Device (Balance Goal 1,  PT) sitting, static  -KR     [place] Level/Cues Needed (Balance Goal 1, PT) supervision required  -KR     Time Frame (Balance Goal 1, PT) 2 weeks  -KR     Progress/Outcomes (Balance Goal 1, PT) goal ongoing  -KR     Row Name 10/15/18 1312          Positioning and Restraints    Pre-Treatment Position in bed  -KR     Post Treatment Position bed  -KR     In Bed notified nsg;supine;call light within reach;encouraged to call for assist;side rails up x3;RUE elevated;LUE elevated  -KR     Row Name 10/15/18 1312          Living Environment    Home Accessibility other (see comments)   no concerns  -KR       User Key  (r) = Recorded By, (t) = Taken By, (c) = Cosigned By    Initials Name Provider Type    Tessa Cevallos PT Physical Therapist          Physical Therapy Education     Title: PT OT SLP Therapies (Active)     Topic: Physical Therapy (Active)     Point: Mobility training (Active)    Learning Progress Summary     Learner Status Readiness Method Response Comment Documented by    Patient Active Acceptance E NR  KR 10/15/18 1312          Point: Home exercise program (Active)    Learning Progress Summary     Learner Status Readiness Method Response Comment Documented by    Patient Active Acceptance E NR  KR 10/15/18 1312          Point: Body mechanics (Active)    Learning Progress Summary     Learner Status Readiness Method Response Comment Documented by    Patient Active Acceptance E NR  KR 10/15/18 1312          Point: Precautions (Active)    Learning Progress Summary     Learner Status Readiness Method Response Comment Documented by    Patient Active Acceptance E NR  KR 10/15/18 1312                      User Key     Initials Effective Dates Name Provider Type Discipline    KR 04/03/18 -  Tessa Napier PT Physical Therapist PT                PT Recommendation and Plan  Anticipated Discharge Disposition (PT): skilled nursing facility  Planned Therapy Interventions (PT Eval): balance training, bed mobility  training, strengthening, transfer training  Therapy Frequency (PT Clinical Impression): daily  Outcome Summary/Treatment Plan (PT)  Anticipated Discharge Disposition (PT): skilled nursing facility  Plan of Care Reviewed With: patient  Outcome Summary: PT initial evaluation completed for pt presenting with increased confusion, lethargy, decreased strength, and a decline in functional mobility. Pt required depA x2 for scooting to HOB. Pt declined any EOB activity. Pt's decreased independence warrants PT skilled care. Recommend D/C to SNF.           Outcome Measures     Row Name 10/15/18 1312             How much help from another person do you currently need...    Turning from your back to your side while in flat bed without using bedrails? 2  -KR      Moving from lying on back to sitting on the side of a flat bed without bedrails? 1  -KR      Moving to and from a bed to a chair (including a wheelchair)? 1  -KR      Standing up from a chair using your arms (e.g., wheelchair, bedside chair)? 1  -KR      Climbing 3-5 steps with a railing? 1  -KR      To walk in hospital room? 1  -KR      AM-PAC 6 Clicks Score 7  -KR         Functional Assessment    Outcome Measure Options AM-PAC 6 Clicks Basic Mobility (PT)  -KR        User Key  (r) = Recorded By, (t) = Taken By, (c) = Cosigned By    Initials Name Provider Type    Tessa Cevallos PT Physical Therapist           Time Calculation:         PT Charges     Row Name 10/15/18 1312             Time Calculation    Start Time 1312  -KR      PT Received On 10/15/18  -KR      PT Goal Re-Cert Due Date 10/25/18  -KR        User Key  (r) = Recorded By, (t) = Taken By, (c) = Cosigned By    Initials Name Provider Type    Tessa Cevallos, PT Physical Therapist        Therapy Suggested Charges     Code   Minutes Charges    None           Therapy Charges for Today     Code Description Service Date Service Provider Modifiers Qty    98773950650 HC PT EVAL MOD COMPLEXITY 4 10/15/2018  Tessa Napier, PT GP 1    91001619383 HC PT THER SUPP EA 15 MIN 10/15/2018 Tessa Napier, PT GP 2          PT G-Codes  Outcome Measure Options: AM-PAC 6 Clicks Basic Mobility (PT)  AM-PAC 6 Clicks Score: 7      Bettye Napier, FRANCOISE  10/15/2018

## 2018-10-15 NOTE — CONSULTS
INFECTIOUS DISEASE CONSULT/INITIAL HOSPITAL VISIT    Joy Bueno  1950  [id number]    Date of Consult: 10/15/2018    Admission Date: 10/14/2018      Requesting Provider: No ref. provider found  Evaluating Physician: Velasquez Olivo MD    Reason for Consultation:  ESBL Ecoli  UTI      History of present illness:    Patient is a 68 y.o. female seen today for a UTI.  She was brought from her nursing home with increasing lethargy, and  confusion over the past several days.  She was hospitalized here in August and treated with Invanz for an ESBL Ecoli UTI.  On admission, her urinalysis had too  Numerous to count white cells, and urine culture now with Gram negative rods . She is hypothermic,  With a leukocytosis of 14, 000,  hgb of 7.0, Scr 4.29. Ertapenem was initiated and we were consulted for evaluation and treatment.      Past Medical History:   Diagnosis Date   • Anemia    • Anxiety    • Asthma    • Chronic kidney disease    • Diabetes mellitus (CMS/[facility])    • Diabetes mellitus, type II (CMS/[facility]) 8/3/2018   • History of Clostridium difficile infection 8/3/2018   • History of respiratory failure, since off home oxygen 8/3/2018   • History of UTI 8/3/2018   • Hypertension    • Morbid obesity (CMS/[facility])    • Osteoarthritis    • Tinea capitis 8/3/2018       Past Surgical History:   Procedure Laterality Date   •  SECTION     • COLONOSCOPY N/A 2017    Procedure: COLONOSCOPY;  Surgeon: Mark I Brunner, MD;  Location:  [facility];  Service:    • ENDOSCOPY N/A 2017    Procedure: ESOPHAGOGASTRODUODENOSCOPY ;  Surgeon: Velasquez Call MD;  Location:  [facility];  Service:    • HERNIA REPAIR         Family History   Problem Relation Age of Onset   • Cardiomyopathy Mother    • Stroke Father    • Diabetes Father        Social History     Social History   • Marital status:      Spouse name: N/A   • Number of children: N/A   • Years of education: N/A     Occupational History   • Not on  file.     Social History Main Topics   • Smoking status: Former Smoker     Packs/day: 0.25   • Smokeless tobacco: Not on file      Comment: unknown when quit, maybe last year   • Alcohol use No   • Drug use: No   • Sexual activity: No     Other Topics Concern   • Not on file     Social History Narrative    Mrs. Bueno is a 68 year old AA  female. She lives alone in [place] but has been in rehab for some time. She has a daughter. She does not have an advanced directive.       Allergies   Allergen Reactions   • Geodon [Ziprasidone Hcl] Unknown (See Comments)     PT DOESN'T REMEMBER   • Haldol [Haloperidol Lactate] Unknown (See Comments)     PT DOESN'T REMEMBER   • Seroquel [Quetiapine Fumarate] Unknown (See Comments)     PT DOESN'T REMEMBER         Medication:    Current Facility-Administered Medications:   •  acetaminophen (TYLENOL) tablet 650 mg, 650 mg, Oral, Q4H PRN, Doris Heard, SUELLEN  •  amLODIPine (NORVASC) tablet 10 mg, 10 mg, Oral, Daily, Doris Heard APRN, Stopped at 10/15/18 0810  •  atorvastatin (LIPITOR) tablet 10 mg, 10 mg, Oral, Nightly, Doris Heard APRN  •  budesonide-formoterol (SYMBICORT) 160-4.5 MCG/ACT inhaler 2 puff, 2 puff, Inhalation, BID - RT, Doris Heard APRN, 2 puff at 10/15/18 0907  •  carvedilol (COREG) tablet 25 mg, 25 mg, Oral, BID With Meals, Doris Heard APRN, Stopped at 10/15/18 0738  •  dextrose (D50W) 25 g/ 50mL Intravenous Solution 25 g, 25 g, Intravenous, Q15 Min PRN, Doris Heard APRN  •  dextrose (GLUTOSE) oral gel 15 g, 15 g, Oral, Q15 Min PRN, Doris Heard APRN  •  ertapenem (INVanz) 500 mg in sodium chloride 0.9 % 50 mL IVPB, 500 mg, Intravenous, Q24H, Velasquez Olivo MD  •  glucagon (human recombinant) (GLUCAGEN DIAGNOSTIC) injection 1 mg, 1 mg, Subcutaneous, PRN, Félix, Doris L, APRN  •  heparin (porcine) 5000 UNIT/ML injection 5,000 Units, 5,000 Units, Subcutaneous, Q12H, Doris Heard, APRN, 5,000 Units at 10/15/18  0807  •  hydrALAZINE (APRESOLINE) tablet 100 mg, 100 mg, Oral, TID, Doris Heard APRN  •  insulin lispro (humaLOG) injection 0-7 Units, 0-7 Units, Subcutaneous, 4x Daily With Meals & Nightly, Doris Heard APRN  •  isosorbide mononitrate (IMDUR) 24 hr tablet 30 mg, 30 mg, Oral, Daily, Doris Heard APRN  •  ondansetron (ZOFRAN) tablet 4 mg, 4 mg, Oral, Q6H PRN **OR** ondansetron (ZOFRAN) injection 4 mg, 4 mg, Intravenous, Q6H PRN, Doris Heard APRN  •  pantoprazole (PROTONIX) EC tablet 40 mg, 40 mg, Oral, Q AM, Doris Heard APRN  •  polyethylene glycol 3350 powder (packet), 17 g, Oral, Daily, Doris Heard APRN  •  saccharomyces boulardii (FLORASTOR) capsule 250 mg, 250 mg, Oral, BID, Doris Heard APRN, Stopped at 10/15/18 0812  •  sertraline (ZOLOFT) tablet 50 mg, 50 mg, Oral, Daily, Doris Heard APRN, Stopped at 10/15/18 0812  •  sodium chloride 0.9 % flush 3 mL, 3 mL, Intravenous, Q12H, Doris Heard APRN, 3 mL at 10/15/18 0806  •  sodium chloride 0.9 % flush 3-10 mL, 3-10 mL, Intravenous, PRN, Doris Heard APRN    Antibiotics:  Anti-Infectives     Ordered     Dose/Rate Route Frequency Start Stop    10/15/18 0920  ertapenem (INVanz) 500 mg in sodium chloride 0.9 % 50 mL IVPB     Ordering Provider:  Velasquez Olivo MD    500 mg  100 mL/hr over 30 Minutes Intravenous Every 24 Hours 10/15/18 1800 10/20/18 1759    10/14/18 1512  ertapenem (INVanz) 1 g/100 mL 0.9% NS VTB (mbp)     Ordering Provider:  Raheel Brunson PA    1 g  over 30 Minutes Intravenous Once 10/14/18 1514 10/14/18 1701            Review of Systems:  No reliable ROS from patient     Physical Exam:   Vital Signs  Temp (24hrs), Av.4 °F (36.3 °C), Min:96.2 °F (35.7 °C), Max:98.2 °F (36.8 °C)    Temp  Min: 96.2 °F (35.7 °C)  Max: 98.2 °F (36.8 °C)  BP  Min: 136/58  Max: 164/63  Pulse  Min: 50  Max: 91  Resp  Min: 15  Max: 22  SpO2  Min: 84 %  Max: 100 %    GENERAL: drowsy, will open eyes to voice  , in no acute distress.   HEENT: Normocephalic, atraumatic.  PERRL. EOMI. No conjunctival injection. No icterus. Oropharynx clear without evidence of thrush or exudate. No evidence of peridontal disease.    NECK: Supple without nuchal rigidity. No mass.  LYMPH: No cervical, axillary or inguinal lymphadenopathy.  HEART: RRR; No murmur, rubs, gallops.   LUNGS:  Few rhonchi   ABDOMEN: Soft, nontender,firm,  bowel sounds quiet  No rebound or guarding. NO mass or HSM.  EXT:  No cyanosis, clubbing or edema. No cord.  : Genitalia generally unremarkable. purewick in place   MSK: FROM without joint effusions noted arms/legs.    SKIN: Warm and dry  Scalp eczema,   Neuro:  Opens eyes to voice.  She does not follow commands.  She moves her extremities.       Laboratory Data      Results from last 7 days  Lab Units 10/15/18  0633 10/14/18  1329   WBC 10*3/mm3 11.79* 14.20*   HEMOGLOBIN g/dL 5.1* 7.0*   HEMATOCRIT % 23.8* 23.1*   PLATELETS 10*3/mm3 194 238       Results from last 7 days  Lab Units 10/15/18  0625   SODIUM mmol/L 144   POTASSIUM mmol/L 5.8*   CHLORIDE mmol/L 115*   CO2 mmol/L 20.0   BUN mg/dL 77*   CREATININE mg/dL 4.33*   GLUCOSE mg/dL 82   CALCIUM mg/dL 8.2*       Results from last 7 days  Lab Units 10/14/18  1329   ALK PHOS U/L 86   BILIRUBIN mg/dL 0.2*   ALT (SGPT) U/L 14   AST (SGOT) U/L 15           UA  TNTC WBCS              Estimated Creatinine Clearance: 17.6 mL/min (A) (by C-G formula based on SCr of 4.33 mg/dL (H)).      Microbiology:  Blood Culture   Date Value Ref Range Status   10/14/2018 No growth at less than 24 hours  Preliminary   10/14/2018 No growth at less than 24 hours  Preliminary                    Urine Culture   Date Value Ref Range Status   10/14/2018 >100,000 CFU/mL Gram Negative Bacilli (A)  Preliminary              Radiology:  Imaging Results (last 72 hours)     Procedure Component Value Units Date/Time    XR Chest 2 View [[id number]] Collected:  10/14/18 1443     Updated:  10/14/18  1653    Narrative:          EXAMINATION: XR CHEST 2 VW - 10/14/2018     INDICATION: Lethargy.      COMPARISON: None.     FINDINGS: Two-view chest reveals the heart to be enlarged. Underlying  chronic changes seen throughout the lung fields bilaterally. No evidence  of acute parenchymal disease. Pulmonary vascularity is pronounced. No  focal parenchymal opacification present. Degenerative change is seen  within the spine.           Impression:       Low lung volumes with chronic changes seen within the lung  fields, enlargement heart and no evidence of acute parenchymal disease.     DICTATED:   10/14/2018  EDITED/ls :   10/14/2018      This report was finalized on 10/14/2018 4:51 PM by Dr. Rosalie Kline MD.       CT Head Without Contrast [[id number]] Collected:  10/14/18 1441     Updated:  10/14/18 1652    Narrative:       EXAMINATION: CT HEAD WO CONTRAST - 10/12/2018     INDICATION: Lethargy, weakness.     TECHNIQUE: Multiple axial CT imaging is obtained of the head from skull  base to skull vertex without the administration of intravenous contrast.     The radiation dose reduction device was turned on for each scan per the  ALARA (As Low as Reasonably Achievable) protocol.     COMPARISON: None.     FINDINGS: The parenchyma is grossly unremarkable. Some low-density area  is seen in the periventricular and subcortical white matter suggesting  chronic small vessel ischemic change. There is no hemorrhage or  hydrocephalus. No mass, mass effect or midline shift. Bony structures  reveal no evidence of osseous abnormality. There is mucosal thickening  of the left maxillary sinus and ethmoid air cells. Globes and orbits are  intact. The mastoid air cells are patent.       Impression:       No acute intracranial abnormality with chronic changes seen  within the brain.     DICTATED:   10/14/2018  EDITED/ls :   10/14/2018      This report was finalized on 10/14/2018 4:50 PM by Dr. Rosalie Kline MD.                Impression:   1.  Pyuria/ gram negative UTI, with prior cultures of ESBL Ecoli in August.  I will leave her on Invanz but her Invanz dosing will need to be reduced to account for her renal dysfunction.  2.  Anemia-severe.   3.  Acute/chronic renal failure/ATN   4.  History of Cdiff-she will remain at risk for relapse of C. difficile colitis due to the need to treat her UTI.  5.  Diabetes Mellitus, type 2-this places her at increased risk for recurrent infections  6.  Encephalopathy-toxic metabolic-this may be secondary to her UTI    PLAN/RECOMMENDATIONS:   Thank you for asking us to see Joy Bueno, I recommend the followin. Blood cultures  2. Change  Invanz to 500mg IV daily    3. Urine culture     Dr. Olivo has obtained the history, performed the physical exam and formulated the above treatment plan.       Velasquez Olivo MD  10/15/2018  11:47 AM

## 2018-10-15 NOTE — CONSULTS
NAL Consult Note    Joy Bueno  1950  [id number]    Date of Admit:  10/14/2018    Date of Consult: 10/15/2018        Requesting Provider: No ref. provider found  Evaluating Physician: Rocky Nova MD        Reason for Consultation:  STAGE 5 CKD.  History of present illness:    Patient is a 68 y.o.  Yr old female KNOWN TO [facility] WITH STAGE 5 CKD SECONDARY TO BX-PROVEN DM NEPHROPATHY, DM, OBESITY, HTN, ANEMIA PRESENTS WITH AMS, WEAKNESS, UTI AND DECREASED ORAL INTAKE. NOTED TO HAVE WORSENING RENAL FXN. WE SAW HER IN THE OFFICE 6 WEEKS AGO AND REFFERED HER TO DR LANDEROS TO EVALUATE FOR CREATION OF AN AVF. SHE DID NOT GO.AT THAT TIME BUN/CREAT /3.86.    Past Medical History:   Diagnosis Date   • Anemia    • Anxiety    • Asthma    • Chronic kidney disease    • Diabetes mellitus (CMS/[facility])    • Diabetes mellitus, type II (CMS/[facility]) 8/3/2018   • History of Clostridium difficile infection 8/3/2018   • History of respiratory failure, since off home oxygen 8/3/2018   • History of UTI 8/3/2018   • Hypertension    • Morbid obesity (CMS/[facility])    • Osteoarthritis    • Tinea capitis 8/3/2018       Past Surgical History:   Procedure Laterality Date   •  SECTION     • COLONOSCOPY N/A 2017    Procedure: COLONOSCOPY;  Surgeon: Mark I Brunner, MD;  Location:  [facility];  Service:    • ENDOSCOPY N/A 2017    Procedure: ESOPHAGOGASTRODUODENOSCOPY ;  Surgeon: Velasquez Call MD;  Location:  [facility];  Service:    • HERNIA REPAIR         Social History     Social History   • Marital status:      Social History Main Topics   • Smoking status: Former Smoker     Packs/day: 0.25      Comment: unknown when quit, maybe last year   • Alcohol use No   • Drug use: No   • Sexual activity: No     Other Topics Concern   • Not on file     Social History Narrative    Mrs. Bueno is a 68 year old AA  female. She lives alone in [place] but has been in rehab for some time. She has a  daughter. She does not have an advanced directive.       family history includes Cardiomyopathy in her mother; Diabetes in her father; Stroke in her father.    Allergies   Allergen Reactions   • Geodon [Ziprasidone Hcl] Unknown (See Comments)     PT DOESN'T REMEMBER   • Haldol [Haloperidol Lactate] Unknown (See Comments)     PT DOESN'T REMEMBER   • Seroquel [Quetiapine Fumarate] Unknown (See Comments)     PT DOESN'T REMEMBER       Medication:    Current Facility-Administered Medications:   •  acetaminophen (TYLENOL) tablet 650 mg, 650 mg, Oral, Q4H PRN, Doris Heard APRN  •  amLODIPine (NORVASC) tablet 10 mg, 10 mg, Oral, Daily, Doris Heard APRN, Stopped at 10/15/18 0810  •  atorvastatin (LIPITOR) tablet 10 mg, 10 mg, Oral, Nightly, Doris Heard APRN  •  budesonide-formoterol (SYMBICORT) 160-4.5 MCG/ACT inhaler 2 puff, 2 puff, Inhalation, BID - RT, Doris Heard APRN, 2 puff at 10/15/18 0907  •  carvedilol (COREG) tablet 25 mg, 25 mg, Oral, BID With Meals, Doris Heard APRN, Stopped at 10/15/18 0738  •  dextrose (D50W) 25 g/ 50mL Intravenous Solution 25 g, 25 g, Intravenous, Q15 Min PRN, Doirs Heard APRN  •  dextrose (GLUTOSE) oral gel 15 g, 15 g, Oral, Q15 Min PRN, Doris Heard APRN  •  epoetin porter (EPOGEN,PROCRIT) injection 20,000 Units, 20,000 Units, Subcutaneous, Once per day on Mon Wed Fri, Rocky Nova MD  •  ertapenem (INVanz) 500 mg in sodium chloride 0.9 % 50 mL IVPB, 500 mg, Intravenous, Q24H, Velasquez Olivo MD  •  glucagon (human recombinant) (GLUCAGEN DIAGNOSTIC) injection 1 mg, 1 mg, Subcutaneous, PRN, Doris Heard APRN  •  heparin (porcine) 5000 UNIT/ML injection 5,000 Units, 5,000 Units, Subcutaneous, Q12H, Doris Heard APRN, 5,000 Units at 10/15/18 0807  •  hydrALAZINE (APRESOLINE) tablet 100 mg, 100 mg, Oral, TID, Doris Heard APRN  •  insulin lispro (humaLOG) injection 0-7 Units, 0-7 Units, Subcutaneous, 4x Daily With Meals &  Nightly, Doris Heard APRN  •  isosorbide mononitrate (IMDUR) 24 hr tablet 30 mg, 30 mg, Oral, Daily, Doris Heard APRN  •  magnesium sulfate 2g/50 mL (PREMIX) infusion, 2 g, Intravenous, Once, Paula Silver MD, Last Rate: 25 mL/hr at 10/15/18 1435, 2 g at 10/15/18 1435  •  ondansetron (ZOFRAN) tablet 4 mg, 4 mg, Oral, Q6H PRN **OR** ondansetron (ZOFRAN) injection 4 mg, 4 mg, Intravenous, Q6H PRN, Doris Heard APRN  •  pantoprazole (PROTONIX) EC tablet 40 mg, 40 mg, Oral, Q AM, Doris Heard APRN  •  polyethylene glycol 3350 powder (packet), 17 g, Oral, Daily, Doris Heard APRN  •  saccharomyces boulardii (FLORASTOR) capsule 250 mg, 250 mg, Oral, BID, Doris Heard APRN, Stopped at 10/15/18 0812  •  sertraline (ZOLOFT) tablet 50 mg, 50 mg, Oral, Daily, Doris Heard APRN, Stopped at 10/15/18 0812  •  sodium bicarbonate tablet 1,300 mg, 1,300 mg, Oral, BID, Rocky Nova MD  •  sodium chloride 0.9 % flush 3 mL, 3 mL, Intravenous, Q12H, Doris Heard APRN, 3 mL at 10/15/18 0806  •  sodium chloride 0.9 % flush 3-10 mL, 3-10 mL, Intravenous, PRN, Doris Heard APRN  •  sodium polystyrene (KAYEXALATE) 15 GM/60ML suspension 30 g, 30 g, Oral, Once, Rocky Nova MD    Prescriptions Prior to Admission   Medication Sig Dispense Refill Last Dose   • acetaminophen (TYLENOL) 325 MG tablet Take 650 mg by mouth Every 4 (Four) Hours As Needed for Mild Pain (1-3).      • albuterol (PROVENTIL HFA;VENTOLIN HFA) 108 (90 BASE) MCG/ACT inhaler Inhale 2 puffs Every 4 (Four) Hours As Needed for Wheezing.      • amLODIPine (NORVASC) 10 MG tablet Take 10 mg by mouth Daily.      • atorvastatin (LIPITOR) 10 MG tablet Take 10 mg by mouth Every Night.      • calcitriol (ROCALTROL) 0.25 MCG capsule Take 0.25 mcg by mouth Daily.      • carvedilol (COREG) 25 MG tablet Take 25 mg by mouth 2 (Two) Times a Day With Meals.      • Cholecalciferol (VITAMIN D3) 97591 units tablet Take 50,000  Units by mouth Every 7 (Seven) Days.      • ferrous sulfate 325 (65 FE) MG tablet Take 325 mg by mouth Daily.      • furosemide (LASIX) 40 MG tablet Take 1 tablet by mouth Daily As Needed (edema/weight gain).      • glipiZIDE (GLUCOTROL XL) 5 MG ER tablet Take 5 mg by mouth Daily.      • hydrALAZINE (APRESOLINE) 100 MG tablet Take 1 tablet by mouth 3 (Three) Times a Day. 90 tablet 0    • isosorbide mononitrate (IMDUR) 30 MG 24 hr tablet Take 30 mg by mouth Daily.      • loratadine (CLARITIN) 10 MG tablet Take 10 mg by mouth Daily.      • melatonin 1 MG tablet Take 3 mg by mouth Every Night.      • mometasone-formoterol (DULERA 200) 200-5 MCG/ACT inhaler Inhale 2 puffs 2 (Two) Times a Day.      • Multiple Vitamins-Minerals (MULTIVITAMIN ADULTS PO) Take 1 tablet by mouth Daily.      • NITROGLYCERIN SL Place 0.4 mg under the tongue. PRN CHEST PAIN      • pantoprazole (PROTONIX) 40 MG EC tablet Take 40 mg by mouth Daily.      • polyethylene glycol (MIRALAX) packet Take 17 g by mouth Daily.      • potassium chloride (K-DUR,KLOR-CON) 20 MEQ CR tablet Take 20 mEq by mouth Daily.      • promethazine (PHENERGAN) 25 MG tablet Take 25 mg by mouth Every 6 (Six) Hours As Needed for Nausea or Vomiting.      • risperiDONE (risperDAL) 0.25 MG tablet Take 0.125 mg by mouth every night at bedtime.      • saccharomyces boulardii (FLORASTOR) 250 MG capsule Take 1 capsule by mouth 2 (Two) Times a Day.      • sertraline (ZOLOFT) 50 MG tablet Take 50 mg by mouth Daily.      • simethicone (MYLICON) 80 MG chewable tablet Chew 80 mg Every 6 (Six) Hours As Needed for Flatulence.      • traZODone (DESYREL) 25 MG half tablet Take 25 mg by mouth At Night As Needed for Sleep.      • vitamin C (ASCORBIC ACID) 250 MG tablet Take 250 mg by mouth Daily.      • castor oil-balsam peru (VENELEX) ointment Apply 1 application topically Every 12 (Twelve) Hours.      • miconazole (MICOTIN) 2 % powder Apply  topically Every 12 (Twelve) Hours. 30 g 0   "      Review of Systems:    Constitutional-- No Fever, chills or sweats. No significant change in weight  Eye-- no diplopia, no conjunctivitis  ENT-- No new hearing or throat complaints.  No epistaxis or oral sores. No odynophagia or dysphagia. No headache, photophobia or neck stiffness.  CV-- No chest pain, palpitations, soa, or edema  Resp-- No SOB/cough/Hemoptysis  GI- No nausea, vomiting, or diarrhea.  No hematochezia, melena, or hematemesis. POOR ORAL INTAKE.  -- No dysuria, hematuria, or flank pain. No lower tract obstructive symptoms  Skin-- No rash, warm and dry  Lymph- no painful or swollen lymph nodes in neck/axilla or groin.   Heme- No active bruising or bleeding; no Hx of DVT or PE.  MS-- no swelling or pain in the joints  Neuro-- No acute focal weakness or numbness in the arms or legs.  No seizures. SOME CONFUSION. GEN WEAKNESS.   Psych--No anxiety or depression  Endo--No cold or heat intolerance.  No polyuria, polydipsia, or polyphagia    Full review of systems reviewed and negative otherwise for acute complaints    Physical Exam:   Vital Signs   Blood pressure 131/62, pulse 71, temperature 96.5 °F (35.8 °C), temperature source Oral, resp. rate 18, height 165.1 cm (65\"), weight 136 kg (300 lb), SpO2 100 %.     GENERAL: Awake and alert, in no acute distress.   HEENT: Normocephalic, atraumatic.  PER.  No conjunctivitis. No icterus. Oropharynx clear without evidence of thrush or exudate. No evidence of peridontal disease.    NECK: Supple without nuchal rigidity. No mass.  LYMPH: No painful cervical, axillary or inguinal lymphadenopathy.  HEART: RRR; No murmur, rubs, gallops. No bruits in neck, abdomen, or groins. TR-1+ edema  LUNGS: Normal respiratory effort. Nonlabored. No dullness.  No crepitus.  Clear to auscultation bilaterally without wheezing, rales, rhonchi.  ABDOMEN: Soft, nontender, nondistended. Positive bowel sounds. No rebound or guarding. NO mass or HSM.  JOINTS:  Full range of motion, no " redness or tenderness.  EXT:  No cyanosis/clubbing.  :  BLADDER NOT DISTENDED.  SKIN: Warm and dry without rash  NEURO: Oriented to PPT. No focal neurological deficits. Strength equal bilateral  PSYCHIATRIC: Cooperative with PE    Laboratory Data: FERRITIN 804. FE SAT 54%.    Results from last 7 days  Lab Units 10/15/18  0633 10/14/18  1329   HEMOGLOBIN g/dL 5.1* 7.0*   HEMATOCRIT % 23.8* 23.1*       Results from last 7 days  Lab Units 10/15/18  0625 10/14/18  1329   SODIUM mmol/L 144 144   POTASSIUM mmol/L 5.8* 5.9*   CHLORIDE mmol/L 115* 119*   CO2 mmol/L 20.0 18.0*   BUN mg/dL 77* 73*   CREATININE mg/dL 4.33* 4.29*   CALCIUM mg/dL 8.2* 8.7   MAGNESIUM mg/dL 1.3  --    ALBUMIN g/dL  --  3.37       Results from last 7 days  Lab Units 10/15/18  0625   GLUCOSE mg/dL 82       Results from last 7 days  Lab Units 10/14/18  1340   COLOR UA  Yellow   CLARITY UA  Turbid*   PH, URINE  6.0   SPECIFIC GRAVITY, URINE  1.020   GLUCOSE UA  Negative   KETONES UA  Negative   BILIRUBIN UA  Negative   PROTEIN UA  100 mg/dL (2+)*   BLOOD UA  Small (1+)*   LEUKOCYTES UA  Large (3+)*   NITRITE UA  Negative       Results from last 7 days  Lab Units 10/14/18  1329   ALK PHOS U/L 86   BILIRUBIN mg/dL 0.2*   ALT (SGPT) U/L 14   AST (SGOT) U/L 15     Estimated Creatinine Clearance: 17.6 mL/min (A) (by C-G formula based on SCr of 4.33 mg/dL (H)).    Radiology:    Impression: STAGE 5 CKD. PATIENT NEEDS TO START DIALYSIS SOON. PATIENT IS AGREEABLE. HYPERKALEMIA. ANEMIA.      PLAN: Thank you for asking us to see Joy Bueno, I recommend the following: NA BICARB. VELTASSA. EPO. ASK SURGERY TO SEE ABOUT PLACING A TUNNELED HD CATH AND CREATION OF AN AVF. START DIALYSIS WHEN TUNNELED CATH IN PLACE.       Rocky Nova MD  10/15/2018  2:50 PM

## 2018-10-15 NOTE — PROGRESS NOTES
Continued Stay Note  [facility]     Patient Name: Joy Bueno  MRN: [id number]  Today's Date: 10/15/2018    Admit Date: 10/14/2018    Consent obtained for the participation in the [facility] Transitions Program. Ca Allred RN        Discharge Plan     Row Name 10/15/18 1411       Plan    Plan [facility]    Patient/Family in Agreement with Plan yes    Plan Comments Spoke with pt at bedside. Pt arrived from [facility] and reports she plans to discharge back there. Pt reports she has a wheelchair that she uses at [facility]. Pt explained that she may need ambulance transportation back to [facility] when she is ready. SW called [facility] [phone] and left a voicemail with admissions staff. SW awaiting call back from [facility] staff.     Final Discharge Disposition Code 64 - nursing facility under Medicaid              Discharge Codes    No documentation.           Ca Allred RN

## 2018-10-15 NOTE — PROGRESS NOTES
Discharge Planning Assessment  [facility]     Patient Name: Joy Bueno  MRN: [id number]  Today's Date: 10/15/2018    Admit Date: 10/14/2018          Discharge Needs Assessment     Row Name 10/15/18 1409       Living Environment    Lives With facility resident   [facility]    Current Living Arrangements extended care facility    Primary Care Provided by other (see comments)   Long term [facility]    Provides Primary Care For no one, unable/limited ability to care for self    Able to Return to Prior Arrangements yes       Resource/Environmental Concerns    Resource/Environmental Concerns none       Transition Planning    Patient/Family Anticipates Transition to long term care facility    Patient/Family Anticipated Services at Transition skilled nursing    Transportation Anticipated --   Ambulance       Discharge Needs Assessment    Readmission Within the Last 30 Days no previous admission in last 30 days    Concerns to be Addressed no discharge needs identified    Equipment Currently Used at Home wheelchair    Anticipated Changes Related to Illness none    Equipment Needed After Discharge none            Discharge Plan     Row Name 10/15/18 1411       Plan    Plan [facility]    Patient/Family in Agreement with Plan yes    Plan Comments Spoke with pt at bedside. Pt arrived from [facility] and reports she plans to discharge back there. Pt reports she has a wheelchair that she uses at [facility]. Pt explained that she may need ambulance transportation back to [facility] when she is ready. SW called [facility] [phone] and left a voicemail with admissions staff. SW awaiting call back from [facility] staff.     Final Discharge Disposition Code 64 - nursing facility under Medicaid        Destination     No service coordination in this encounter.      Durable Medical Equipment     No service coordination in this encounter.      Dialysis/Infusion     No service coordination in this encounter.      Home Medical Care     No  service coordination in this encounter.      Social Care     No service coordination in this encounter.                Demographic Summary     Row Name 10/15/18 1408       General Information    Arrived From [facility]     Reason for Consult discharge planning            Functional Status     Row Name 10/15/18 1409       Functional Status, IADL    Medications completely dependent    Meal Preparation completely dependent    Housekeeping completely dependent    Laundry completely dependent    Shopping completely dependent            Psychosocial    No documentation.           Abuse/Neglect    No documentation.           Legal    No documentation.           Substance Abuse    No documentation.           Patient Forms    No documentation.         ROGELIO Maloney

## 2018-10-16 ENCOUNTER — ANESTHESIA EVENT (OUTPATIENT)
Dept: PERIOP | Facility: HOSPITAL | Age: 68
End: 2018-10-16

## 2018-10-16 LAB
ALBUMIN SERPL-MCNC: 3.12 G/DL (ref 3.2–4.8)
ANION GAP SERPL CALCULATED.3IONS-SCNC: 8 MMOL/L (ref 3–11)
BACTERIA SPEC AEROBE CULT: ABNORMAL
BASOPHILS # BLD AUTO: 0.02 10*3/MM3 (ref 0–0.2)
BASOPHILS NFR BLD AUTO: 0.2 % (ref 0–1)
BUN BLD-MCNC: 78 MG/DL (ref 9–23)
BUN/CREAT SERPL: 17.6 (ref 7–25)
CALCIUM SPEC-SCNC: 8.2 MG/DL (ref 8.7–10.4)
CHLORIDE SERPL-SCNC: 115 MMOL/L (ref 99–109)
CO2 SERPL-SCNC: 20 MMOL/L (ref 20–31)
CREAT BLD-MCNC: 4.42 MG/DL (ref 0.6–1.3)
DEPRECATED RDW RBC AUTO: 60.7 FL (ref 37–54)
EOSINOPHIL # BLD AUTO: 0.22 10*3/MM3 (ref 0–0.3)
EOSINOPHIL NFR BLD AUTO: 1.8 % (ref 0–3)
ERYTHROCYTE [DISTWIDTH] IN BLOOD BY AUTOMATED COUNT: 16.9 % (ref 11.3–14.5)
GFR SERPL CREATININE-BSD FRML MDRD: 12 ML/MIN/1.73
GLUCOSE BLD-MCNC: 147 MG/DL (ref 70–100)
GLUCOSE BLDC GLUCOMTR-MCNC: 123 MG/DL (ref 70–130)
GLUCOSE BLDC GLUCOMTR-MCNC: 124 MG/DL (ref 70–130)
GLUCOSE BLDC GLUCOMTR-MCNC: 162 MG/DL (ref 70–130)
GLUCOSE BLDC GLUCOMTR-MCNC: 190 MG/DL (ref 70–130)
HAV IGM SERPL QL IA: NORMAL
HBV CORE IGM SERPL QL IA: NORMAL
HBV SURFACE AG SERPL QL IA: NORMAL
HCT VFR BLD AUTO: 22.8 % (ref 34.5–44)
HCT VFR BLD AUTO: 22.9 % (ref 34.5–44)
HCV AB SER DONR QL: NORMAL
HGB BLD-MCNC: 6.8 G/DL (ref 11.5–15.5)
HGB BLD-MCNC: 6.9 G/DL (ref 11.5–15.5)
IMM GRANULOCYTES # BLD: 0.17 10*3/MM3 (ref 0–0.03)
IMM GRANULOCYTES NFR BLD: 1.4 % (ref 0–0.6)
LYMPHOCYTES # BLD AUTO: 1.51 10*3/MM3 (ref 0.6–4.8)
LYMPHOCYTES NFR BLD AUTO: 12.1 % (ref 24–44)
MAGNESIUM SERPL-MCNC: 1.5 MG/DL (ref 1.3–2.7)
MCH RBC QN AUTO: 30 PG (ref 27–31)
MCHC RBC AUTO-ENTMCNC: 30.3 G/DL (ref 32–36)
MCV RBC AUTO: 99.1 FL (ref 80–99)
MONOCYTES # BLD AUTO: 0.86 10*3/MM3 (ref 0–1)
MONOCYTES NFR BLD AUTO: 6.9 % (ref 0–12)
NEUTROPHILS # BLD AUTO: 9.85 10*3/MM3 (ref 1.5–8.3)
NEUTROPHILS NFR BLD AUTO: 79 % (ref 41–71)
PHOSPHATE SERPL-MCNC: 6.4 MG/DL (ref 2.4–5.1)
PLATELET # BLD AUTO: 210 10*3/MM3 (ref 150–450)
PMV BLD AUTO: 10.1 FL (ref 6–12)
POTASSIUM BLD-SCNC: 5.6 MMOL/L (ref 3.5–5.5)
RBC # BLD AUTO: 2.3 10*6/MM3 (ref 3.89–5.14)
SODIUM BLD-SCNC: 143 MMOL/L (ref 132–146)
WBC NRBC COR # BLD: 12.46 10*3/MM3 (ref 3.5–10.8)

## 2018-10-16 PROCEDURE — 86900 BLOOD TYPING SEROLOGIC ABO: CPT

## 2018-10-16 PROCEDURE — P9016 RBC LEUKOCYTES REDUCED: HCPCS

## 2018-10-16 PROCEDURE — 85025 COMPLETE CBC W/AUTO DIFF WBC: CPT | Performed by: INTERNAL MEDICINE

## 2018-10-16 PROCEDURE — 94660 CPAP INITIATION&MGMT: CPT

## 2018-10-16 PROCEDURE — 36430 TRANSFUSION BLD/BLD COMPNT: CPT

## 2018-10-16 PROCEDURE — 80069 RENAL FUNCTION PANEL: CPT | Performed by: INTERNAL MEDICINE

## 2018-10-16 PROCEDURE — 97166 OT EVAL MOD COMPLEX 45 MIN: CPT

## 2018-10-16 PROCEDURE — 82962 GLUCOSE BLOOD TEST: CPT

## 2018-10-16 PROCEDURE — 85014 HEMATOCRIT: CPT | Performed by: INTERNAL MEDICINE

## 2018-10-16 PROCEDURE — 94799 UNLISTED PULMONARY SVC/PX: CPT

## 2018-10-16 PROCEDURE — 94640 AIRWAY INHALATION TREATMENT: CPT

## 2018-10-16 PROCEDURE — 25010000002 ERTAPENEM PER 500 MG: Performed by: INTERNAL MEDICINE

## 2018-10-16 PROCEDURE — 63710000001 INSULIN LISPRO (HUMAN) PER 5 UNITS: Performed by: NURSE PRACTITIONER

## 2018-10-16 PROCEDURE — 25010000002 HEPARIN (PORCINE) PER 1000 UNITS: Performed by: NURSE PRACTITIONER

## 2018-10-16 PROCEDURE — 83735 ASSAY OF MAGNESIUM: CPT | Performed by: INTERNAL MEDICINE

## 2018-10-16 PROCEDURE — 80074 ACUTE HEPATITIS PANEL: CPT | Performed by: INTERNAL MEDICINE

## 2018-10-16 PROCEDURE — 99233 SBSQ HOSP IP/OBS HIGH 50: CPT | Performed by: INTERNAL MEDICINE

## 2018-10-16 PROCEDURE — 85018 HEMOGLOBIN: CPT | Performed by: INTERNAL MEDICINE

## 2018-10-16 RX ORDER — TRAMADOL HYDROCHLORIDE 50 MG/1
25 TABLET ORAL ONCE
Status: COMPLETED | OUTPATIENT
Start: 2018-10-16 | End: 2018-10-16

## 2018-10-16 RX ORDER — SODIUM POLYSTYRENE SULFONATE 15 G/60ML
30 SUSPENSION ORAL; RECTAL ONCE
Status: DISCONTINUED | OUTPATIENT
Start: 2018-10-16 | End: 2018-10-16 | Stop reason: CLARIF

## 2018-10-16 RX ORDER — FAMOTIDINE 10 MG/ML
20 INJECTION, SOLUTION INTRAVENOUS ONCE
Status: CANCELLED | OUTPATIENT
Start: 2018-10-16 | End: 2018-10-16

## 2018-10-16 RX ADMIN — BUDESONIDE AND FORMOTEROL FUMARATE DIHYDRATE 2 PUFF: 160; 4.5 AEROSOL RESPIRATORY (INHALATION) at 07:07

## 2018-10-16 RX ADMIN — ATORVASTATIN CALCIUM 10 MG: 10 TABLET, FILM COATED ORAL at 20:51

## 2018-10-16 RX ADMIN — HEPARIN SODIUM 5000 UNITS: 5000 INJECTION, SOLUTION INTRAVENOUS; SUBCUTANEOUS at 08:09

## 2018-10-16 RX ADMIN — Medication 3 ML: at 20:57

## 2018-10-16 RX ADMIN — HYDRALAZINE HYDROCHLORIDE 100 MG: 50 TABLET, FILM COATED ORAL at 20:55

## 2018-10-16 RX ADMIN — SERTRALINE HYDROCHLORIDE 50 MG: 50 TABLET ORAL at 08:08

## 2018-10-16 RX ADMIN — Medication 250 MG: at 20:52

## 2018-10-16 RX ADMIN — ERTAPENEM SODIUM 500 MG: 1 INJECTION, POWDER, LYOPHILIZED, FOR SOLUTION INTRAMUSCULAR; INTRAVENOUS at 17:30

## 2018-10-16 RX ADMIN — ISOSORBIDE MONONITRATE 30 MG: 30 TABLET, EXTENDED RELEASE ORAL at 08:09

## 2018-10-16 RX ADMIN — SODIUM BICARBONATE 650 MG TABLET 1300 MG: at 08:08

## 2018-10-16 RX ADMIN — ACETAMINOPHEN 650 MG: 325 TABLET ORAL at 13:32

## 2018-10-16 RX ADMIN — INSULIN LISPRO 2 UNITS: 100 INJECTION, SOLUTION INTRAVENOUS; SUBCUTANEOUS at 17:31

## 2018-10-16 RX ADMIN — SODIUM BICARBONATE 650 MG TABLET 1300 MG: at 20:52

## 2018-10-16 RX ADMIN — PANTOPRAZOLE SODIUM 40 MG: 40 TABLET, DELAYED RELEASE ORAL at 08:08

## 2018-10-16 RX ADMIN — HEPARIN SODIUM 5000 UNITS: 5000 INJECTION, SOLUTION INTRAVENOUS; SUBCUTANEOUS at 20:55

## 2018-10-16 RX ADMIN — CARVEDILOL 25 MG: 12.5 TABLET, FILM COATED ORAL at 08:08

## 2018-10-16 RX ADMIN — AMLODIPINE BESYLATE 10 MG: 10 TABLET ORAL at 08:08

## 2018-10-16 RX ADMIN — TRAMADOL HYDROCHLORIDE 25 MG: 50 TABLET, FILM COATED ORAL at 04:26

## 2018-10-16 RX ADMIN — BUDESONIDE AND FORMOTEROL FUMARATE DIHYDRATE 2 PUFF: 160; 4.5 AEROSOL RESPIRATORY (INHALATION) at 20:40

## 2018-10-16 RX ADMIN — Medication 250 MG: at 08:09

## 2018-10-16 RX ADMIN — PATIROMER 1 PACKET: 8.4 POWDER, FOR SUSPENSION ORAL at 13:55

## 2018-10-16 RX ADMIN — ACETAMINOPHEN 650 MG: 325 TABLET ORAL at 01:40

## 2018-10-16 RX ADMIN — INSULIN LISPRO 2 UNITS: 100 INJECTION, SOLUTION INTRAVENOUS; SUBCUTANEOUS at 08:13

## 2018-10-16 RX ADMIN — HYDRALAZINE HYDROCHLORIDE 100 MG: 50 TABLET, FILM COATED ORAL at 08:08

## 2018-10-16 NOTE — PROGRESS NOTES
INFECTIOUS DISEASE PROGRESS NOTE    Joy Bueno  1950  [id number]      Admission Date: 10/14/2018      Requesting Provider: No ref. provider found  Evaluating Physician: Velasquez Olivo MD    Reason for Consultation:  ESBL Ecoli  UTI      History of present illness:    10/15/18: Patient is a 68 y.o. female seen today for a UTI.  She was brought from her nursing home with increasing lethargy, and  confusion over the past several days.  She was hospitalized here in August and treated with Invanz for an ESBL Ecoli UTI.  On admission, her urinalysis had too  Numerous to count white cells, and urine culture now with Gram negative rods . She is hypothermic,  With a leukocytosis of 14, 000,  hgb of 7.0, Scr 4.29. Ertapenem was initiated and we were consulted for evaluation and treatment.    10/16/18: She denies nausea and vomiting.  She has decreased dyspnea.  She is scheduled for hemodialysis access by Dr. Calderón.  I discussed her situation with Dr. Calderón today.    Past Medical History:   Diagnosis Date   • Anemia    • Anxiety    • Asthma    • Chronic kidney disease    • Diabetes mellitus (CMS/formerly [facility])    • Diabetes mellitus, type II (CMS/formerly [facility]) 8/3/2018   • History of Clostridium difficile infection 8/3/2018   • History of respiratory failure, since off home oxygen 8/3/2018   • History of UTI 8/3/2018   • Hypertension    • Morbid obesity (CMS/formerly [facility])    • Osteoarthritis    • Tinea capitis 8/3/2018       Past Surgical History:   Procedure Laterality Date   •  SECTION     • COLONOSCOPY N/A 2017    Procedure: COLONOSCOPY;  Surgeon: Mark I Brunner, MD;  Location:  [facility];  Service:    • ENDOSCOPY N/A 2017    Procedure: ESOPHAGOGASTRODUODENOSCOPY ;  Surgeon: Velasquez Call MD;  Location:  [facility];  Service:    • HERNIA REPAIR         Family History   Problem Relation Age of Onset   • Cardiomyopathy Mother    • Stroke Father    • Diabetes Father        Social History     Social History    • Marital status:      Spouse name: N/A   • Number of children: N/A   • Years of education: N/A     Occupational History   • Not on file.     Social History Main Topics   • Smoking status: Former Smoker     Packs/day: 0.25   • Smokeless tobacco: Not on file      Comment: unknown when quit, maybe last year   • Alcohol use No   • Drug use: No   • Sexual activity: No     Other Topics Concern   • Not on file     Social History Narrative    Mrs. Bueno is a 68 year old AA  female. She lives alone in [place] but has been in rehab for some time. She has a daughter. She does not have an advanced directive.       Allergies   Allergen Reactions   • Geodon [Ziprasidone Hcl] Unknown (See Comments)     PT DOESN'T REMEMBER   • Haldol [Haloperidol Lactate] Unknown (See Comments)     PT DOESN'T REMEMBER   • Seroquel [Quetiapine Fumarate] Unknown (See Comments)     PT DOESN'T REMEMBER         Medication:    Current Facility-Administered Medications:   •  acetaminophen (TYLENOL) tablet 650 mg, 650 mg, Oral, Q4H PRN, Doris Heard, APRN, 650 mg at 10/16/18 1332  •  amLODIPine (NORVASC) tablet 10 mg, 10 mg, Oral, Daily, Doris Heard, APRN, 10 mg at 10/16/18 0808  •  atorvastatin (LIPITOR) tablet 10 mg, 10 mg, Oral, Nightly, Doris Heard, APRN, 10 mg at 10/15/18 1956  •  budesonide-formoterol (SYMBICORT) 160-4.5 MCG/ACT inhaler 2 puff, 2 puff, Inhalation, BID - RT, Doris Heard, APRN, 2 puff at 10/16/18 0707  •  carvedilol (COREG) tablet 25 mg, 25 mg, Oral, BID With Meals, Doris Heard, APRN, 25 mg at 10/16/18 0808  •  dextrose (D50W) 25 g/ 50mL Intravenous Solution 25 g, 25 g, Intravenous, Q15 Min PRN, Doris Heard, APRN  •  dextrose (GLUTOSE) oral gel 15 g, 15 g, Oral, Q15 Min PRN, Dorsi Heard, APRN  •  epoetin porter (EPOGEN,PROCRIT) injection 20,000 Units, 20,000 Units, Subcutaneous, Once per day on Mon Wed Fri, [place], Rocky Bustamante MD, 20,000 Units at 10/15/18 5139  •  ertapenem  (INVanz) 500 mg in sodium chloride 0.9 % 50 mL IVPB, 500 mg, Intravenous, Q24H, Velasquez Olivo MD, Last Rate: 100 mL/hr at 10/15/18 1808, 500 mg at 10/15/18 1808  •  glucagon (human recombinant) (GLUCAGEN DIAGNOSTIC) injection 1 mg, 1 mg, Subcutaneous, PRN, Doris Heard, APRN  •  heparin (porcine) 5000 UNIT/ML injection 5,000 Units, 5,000 Units, Subcutaneous, Q12H, Doris Heard APRN, 5,000 Units at 10/16/18 0809  •  hydrALAZINE (APRESOLINE) tablet 100 mg, 100 mg, Oral, TID, Doris Heard APRN, 100 mg at 10/16/18 0808  •  Influenza Vac Subunit Quad (FLUCELVAX) injection 0.5 mL, 0.5 mL, Intramuscular, During Hospitalization, Paula Silver MD  •  insulin lispro (humaLOG) injection 0-7 Units, 0-7 Units, Subcutaneous, 4x Daily With Meals & Nightly, Doris Heard APRN, 2 Units at 10/16/18 0813  •  isosorbide mononitrate (IMDUR) 24 hr tablet 30 mg, 30 mg, Oral, Daily, Doris Heard APRN, 30 mg at 10/16/18 0809  •  ondansetron (ZOFRAN) tablet 4 mg, 4 mg, Oral, Q6H PRN **OR** ondansetron (ZOFRAN) injection 4 mg, 4 mg, Intravenous, Q6H PRN, Doris Heard APRGISEL  •  pantoprazole (PROTONIX) EC tablet 40 mg, 40 mg, Oral, Q AM, Doris Heard APRN, 40 mg at 10/16/18 0808  •  Patiromer Sorbitex Calcium pack 1 packet, 16.8 g, Oral, Once, Chaparrita Olmedo, [facility], Stopped at 10/15/18 1632  •  polyethylene glycol 3350 powder (packet), 17 g, Oral, Daily, Doris Heard APRGISEL  •  saccharomyces boulardii (FLORASTOR) capsule 250 mg, 250 mg, Oral, BID, Doris Heard APRN, 250 mg at 10/16/18 0809  •  sertraline (ZOLOFT) tablet 50 mg, 50 mg, Oral, Daily, Doris Heard APRN, 50 mg at 10/16/18 0808  •  sodium bicarbonate tablet 1,300 mg, 1,300 mg, Oral, BID, Rokcy Nova MD, 1,300 mg at 10/16/18 0808  •  sodium chloride 0.9 % flush 3 mL, 3 mL, Intravenous, Q12H, Doris Heard APRN, 3 mL at 10/15/18 1955  •  sodium chloride 0.9 % flush 3-10 mL, 3-10 mL, Intravenous, PRN, Félix, Doris L,  APRN    Antibiotics:  Anti-Infectives     Ordered     Dose/Rate Route Frequency Start Stop    10/15/18 0920  ertapenem (INVanz) 500 mg in sodium chloride 0.9 % 50 mL IVPB     Ordering Provider:  Velasquez Olivo MD    500 mg  100 mL/hr over 30 Minutes Intravenous Every 24 Hours 10/15/18 1800 10/20/18 1759    10/14/18 1512  ertapenem (INVanz) 1 g/100 mL 0.9% NS VTB (mbp)     Ordering Provider:  Raheel Brunson PA    1 g  over 30 Minutes Intravenous Once 10/14/18 1514 10/14/18 1701            Review of Systems:  No reliable ROS from patient     Physical Exam:   Vital Signs  Temp (24hrs), Av.5 °F (36.4 °C), Min:97 °F (36.1 °C), Max:98.6 °F (37 °C)    Temp  Min: 97 °F (36.1 °C)  Max: 98.6 °F (37 °C)  BP  Min: 105/44  Max: 156/64  Pulse  Min: 67  Max: 78  Resp  Min: 18  Max: 20  SpO2  Min: 94 %  Max: 100 %    GENERAL: Or alert and responsive.  She is in no acute distress..   HEENT: Normocephalic, atraumatic.  PERRL. EOMI. No conjunctival injection. No icterus. Oropharynx clear without evidence of thrush or exudate.   NECK: Supple without nuchal rigidity. No mass.  LYMPH: No cervical, axillary or inguinal lymphadenopathy.  HEART: RRR; No murmur, rubs, gallops.   LUNGS:  Few rhonchi   ABDOMEN: Soft, nontender,firm,  bowel sounds quiet  No rebound or guarding. NO mass or HSM.  EXT:  No cyanosis, clubbing or edema. No cord.  : Genitalia generally unremarkable. purewick in place   MSK: FROM without joint effusions noted arms/legs.    SKIN: Warm and dry  Scalp eczema,   Neuro: She is more alert and responsive but is confused.    Laboratory Data      Results from last 7 days  Lab Units 10/16/18  0802 10/15/18  0633 10/14/18  1329   WBC 10*3/mm3 12.46* 11.79* 14.20*   HEMOGLOBIN g/dL 6.9* 5.1* 7.0*   HEMATOCRIT % 22.8* 23.8* 23.1*   PLATELETS 10*3/mm3 210 194 238       Results from last 7 days  Lab Units 10/16/18  0802   SODIUM mmol/L 143   POTASSIUM mmol/L 5.6*   CHLORIDE mmol/L 115*   CO2 mmol/L 20.0   BUN mg/dL  78*   CREATININE mg/dL 4.42*   GLUCOSE mg/dL 147*   CALCIUM mg/dL 8.2*       Results from last 7 days  Lab Units 10/14/18  1329   ALK PHOS U/L 86   BILIRUBIN mg/dL 0.2*   ALT (SGPT) U/L 14   AST (SGOT) U/L 15           UA  TNTC WBCS              Estimated Creatinine Clearance: 17.3 mL/min (A) (by C-G formula based on SCr of 4.42 mg/dL (H)).      Microbiology:  Blood Culture   Date Value Ref Range Status   10/14/2018 No growth at less than 24 hours  Preliminary   10/14/2018 No growth at less than 24 hours  Preliminary                    Urine Culture   Date Value Ref Range Status   10/14/2018 >100,000 CFU/mL Gram Negative Bacilli (A)  Preliminary      Urine culture is growing ESBL Klebsiella        Radiology:  Imaging Results (last 72 hours)     Procedure Component Value Units Date/Time    XR Chest 2 View [[id number]] Collected:  10/14/18 1443     Updated:  10/14/18 1653    Narrative:          EXAMINATION: XR CHEST 2 VW - 10/14/2018     INDICATION: Lethargy.      COMPARISON: None.     FINDINGS: Two-view chest reveals the heart to be enlarged. Underlying  chronic changes seen throughout the lung fields bilaterally. No evidence  of acute parenchymal disease. Pulmonary vascularity is pronounced. No  focal parenchymal opacification present. Degenerative change is seen  within the spine.           Impression:       Low lung volumes with chronic changes seen within the lung  fields, enlargement heart and no evidence of acute parenchymal disease.     DICTATED:   10/14/2018  EDITED/ls :   10/14/2018      This report was finalized on 10/14/2018 4:51 PM by Dr. Rosalie Kline MD.       CT Head Without Contrast [[id number]] Collected:  10/14/18 1441     Updated:  10/14/18 1652    Narrative:       EXAMINATION: CT HEAD WO CONTRAST - 10/12/2018     INDICATION: Lethargy, weakness.     TECHNIQUE: Multiple axial CT imaging is obtained of the head from skull  base to skull vertex without the administration of intravenous contrast.      The radiation dose reduction device was turned on for each scan per the  ALARA (As Low as Reasonably Achievable) protocol.     COMPARISON: None.     FINDINGS: The parenchyma is grossly unremarkable. Some low-density area  is seen in the periventricular and subcortical white matter suggesting  chronic small vessel ischemic change. There is no hemorrhage or  hydrocephalus. No mass, mass effect or midline shift. Bony structures  reveal no evidence of osseous abnormality. There is mucosal thickening  of the left maxillary sinus and ethmoid air cells. Globes and orbits are  intact. The mastoid air cells are patent.       Impression:       No acute intracranial abnormality with chronic changes seen  within the brain.     DICTATED:   10/14/2018  EDITED/ls :   10/14/2018      This report was finalized on 10/14/2018 4:50 PM by Dr. Rosalie Kilne MD.               Impression:   1.  ESBL Klebsiella UTI-I will continue her on Invanz therapy.    2.  Anemia-severe.   3.  Acute/chronic renal failure/ATN   4.  History of Cdiff-she will remain at risk for relapse of C. difficile colitis due to the need to treat her UTI.  5.  Diabetes Mellitus, type 2-this places her at increased risk for recurrent infections  6.  Encephalopathy-toxic metabolic-this may be secondary to her UTI    PLAN/RECOMMENDATIONS:   1.  Continue intravenous Invanz   2.  Dialysis access-per Dr. Stephane Olivo MD  10/16/2018  4:34 PM

## 2018-10-16 NOTE — THERAPY EVALUATION
Acute Care - Occupational Therapy Initial Evaluation  [facility]     Patient Name: Joy Bueno  : 1950  MRN: [id number]  Today's Date: 10/16/2018  Onset of Illness/Injury or Date of Surgery: 10/14/18  Date of Referral to OT: 10/14/18  Referring Physician: ethan    Admit Date: 10/14/2018       ICD-10-CM ICD-9-CM   1. Acute UTI N39.0 599.0   2. History of ESBL Klebsiella pneumoniae infection Z86.19 V12.09   3. Acute renal failure superimposed on chronic kidney disease, unspecified CKD stage, unspecified acute renal failure type (CMS/HCC) N17.9 584.9    N18.9 585.9   4. Anemia of chronic disease D63.8 285.29   5. Impaired functional mobility, balance, gait, and endurance Z74.09 V49.89   6. Impaired mobility and ADLs Z74.09 799.89     Patient Active Problem List   Diagnosis   • Asthma   • Hypertension   • Symptomatic anemia   • Morbid obesity (CMS/[facility])   • Renal failure   • Anxiety   • Anemia   • CKD (chronic kidney disease) Stage 3   • Gastritis   • Pleural effusion, right   • Leukocytosis   • Possible pneumonia of RML/RLL   • Clostridium difficile diarrhea   • UTI (urinary tract infection)   • Bilateral leg pain   • Generalized weakness   • Abdominal pain   • Chronic heart failure (CMS/[facility])   • Anemia in stage 4 chronic kidney disease (CMS/HCC)   • CKD (chronic kidney disease) stage 4, GFR 15-29 ml/min (CMS/[facility])   • Diabetes mellitus, type II (CMS/[facility])   • History of UTI   • History of Clostridium difficile infection   • Tinea capitis   • History of respiratory failure, since off home oxygen   • Anemia of chronic renal failure, stage 5 (CMS/[facility])   • Hyperkalemia   • Metabolic encephalopathy   • Severe sepsis (CMS/[facility])   • Sepsis (CMS/[facility])     Past Medical History:   Diagnosis Date   • Anemia    • Anxiety    • Asthma    • Chronic kidney disease    • Diabetes mellitus (CMS/[facility])    • Diabetes mellitus, type II (CMS/HCC) 8/3/2018   • History of Clostridium difficile infection 8/3/2018   • History of  respiratory failure, since off home oxygen 8/3/2018   • History of UTI 8/3/2018   • Hypertension    • Morbid obesity (CMS/HCC)    • Osteoarthritis    • Tinea capitis 8/3/2018     Past Surgical History:   Procedure Laterality Date   •  SECTION     • COLONOSCOPY N/A 2017    Procedure: COLONOSCOPY;  Surgeon: Mark I Brunner, MD;  Location:  [facility];  Service:    • ENDOSCOPY N/A 2017    Procedure: ESOPHAGOGASTRODUODENOSCOPY ;  Surgeon: Velasquez Call MD;  Location:  [facility];  Service:    • HERNIA REPAIR            OT ASSESSMENT FLOWSHEET (last 72 hours)      Occupational Therapy Evaluation     Row Name 10/16/18 1135                   OT Evaluation Time/Intention    Subjective Information complains of;weakness;fatigue;pain  -TB        Document Type evaluation  -TB        Mode of Treatment occupational therapy;individual therapy  -TB        Patient Effort fair  -TB        Symptoms Noted During/After Treatment fatigue;increased pain  -TB           General Information    Patient Profile Reviewed? yes  -TB        Onset of Illness/Injury or Date of Surgery 10/14/18  -TB        Referring Physician ethan  -TB        Patient Observations alert;cooperative;agree to therapy  -TB        Equipment Currently Used at Home lift device;wheelchair  -TB        Pertinent History of Current Functional Problem Pt to ED from SNF with c/o lethargy. Staff reports worsening confusion and lethargy x5 days. Pt admitted with acute UTI, NATALIO and metabolic encephalopathy; h/o CKD stage 4-5  -TB        Existing Precautions/Restrictions fall;oxygen therapy device and L/min  -TB        Risks Reviewed patient:;LOB;increased discomfort;lines disloged  -TB        Benefits Reviewed patient:;improve function;increase strength;increase knowledge  -TB        Barriers to Rehab medically complex;previous functional deficit  -TB           Relationship/Environment    Lives With facility resident  -TB        Concerns About  "Impact on Relationships Pt's goal is to return to her home after STR at SNF; reports having [employer] family to help  -TB           Resource/Environmental Concerns    Current Living Arrangements extended care facility  -TB           Cognitive Assessment/Intervention- PT/OT    Affect/Mental Status (Cognitive) WFL  -TB        Orientation Status (Cognition) oriented to;person;place;situation  -TB        Follows Commands (Cognition) follows one step commands;verbal cues/prompting required;repetition of directions required;75-90% accuracy  -TB        Safety Deficit (Cognitive) insight into deficits/self awareness  -TB        Personal Safety Interventions fall prevention program maintained  -TB           Safety Issues, Functional Mobility    Safety Issues Affecting Function (Mobility) insight into deficits/self awareness  -TB        Impairments Affecting Function (Mobility) endurance/activity tolerance;pain;strength;range of motion (ROM)  -TB        Comment, Safety Issues/Impairments (Mobility) Limited mobility since January  -TB           Bed Mobility Assessment/Treatment    Bed Mobility Assessment/Treatment rolling left;rolling right  -TB        Rolling Left [place] (Bed Mobility) maximum assist (25% patient effort)  -TB        Rolling Right [place] (Bed Mobility) maximum assist (25% patient effort)  -TB        Bed Mobility, Safety Issues decreased use of arms for pushing/pulling;decreased use of legs for bridging/pushing  -TB        Assistive Device (Bed Mobility) draw sheet;bed rails  -TB           Functional Mobility    Functional Mobility- Comment Pt non-ambulatory at baseline/since January  -TB           Transfer Assessment/Treatment    Comment (Transfers) Pt declined EOB/OOB - \"I had a rough night\"  -TB           ADL Assessment/Intervention    BADL Assessment/Intervention grooming;feeding;lower body dressing;bathing  -TB           Bathing Assessment/Intervention    Comment (Bathing) Max A at baseline  -TB   "         Lower Body Dressing Assessment/Training    Comment (Lower Body Dressing) Dependent at baseline  -TB           Grooming Assessment/Training    [place] Level (Grooming) set up;wash face, hands  -TB        Grooming Position sitting up in bed  -TB           Self-Feeding Assessment/Training    [place] Level (Feeding) independent;feeding skills;liquids to mouth  -TB        Position (Self-Feeding) sitting up in bed  -TB           BADL Safety/Performance    Impairments, BADL Safety/Performance endurance/activity tolerance;pain;strength;range of motion  -TB           General ROM    GENERAL ROM COMMENTS B Shoulder deficits  -TB           MMT (Manual Muscle Testing)    General MMT Comments B Shoulders 2+,3-/5  -TB           Sensory Assessment/Intervention    Sensory General Assessment no sensation deficits identified   B UE intact  -TB           Positioning and Restraints    Pre-Treatment Position in bed  -TB        Post Treatment Position bed  -TB        In Bed notified nsg;supine;call light within reach;encouraged to call for assist;exit alarm on  -TB           Pain Assessment    Additional Documentation Pain Scale: Word Pre/Post-Treatment (Group)  -TB           Pain Scale: Numbers Pre/Post-Treatment    Pain Location - Side Right  -TB        Pain Location - Orientation upper  -TB        Pain Location extremity   above IV site  -TB        Pain Intervention(s) Repositioned  -TB           Pain Scale: Word Pre/Post-Treatment    Pain: Word Scale, Pretreatment 4 - moderate pain  -TB        Pain: Word Scale, Post-Treatment 6 - moderate-severe pain  -TB        Pre/Post Treatment Pain Comment RN notified  -TB           Coping    Observed Emotional State accepting;calm;cooperative  -TB        Verbalized Emotional State acceptance;hopefulness  -TB           Plan of Care Review    Plan of Care Reviewed With patient  -TB           Clinical Impression (OT)    Date of Referral to OT 10/14/18  -TB        OT Diagnosis  "Impaired mobility and ADL  -TB        Patient/Family Goals Statement (OT Eval) Pt goal is to be able to return home after STR  -TB        Criteria for Skilled Therapeutic Interventions Met (OT Eval) yes;treatment indicated  -TB        Rehab Potential (OT Eval) fair, will monitor progress closely  -TB        Therapy Frequency (OT Eval) 3 times/wk  -TB        Care Plan Review (OT) evaluation/treatment results reviewed;care plan/treatment goals reviewed;patient/other agree to care plan  -TB        Anticipated Equipment Needs at Discharge (OT) --   None  -TB        Anticipated Discharge Disposition (OT) skilled nursing facility   Return to SNF  -TB        Patient/Family Concerns, Anticipated Discharge Disposition (OT) Pt wants daily therapy at d/c - Wants MD to write for \"OOB daily\"  -TB           Vital Signs    Pre Systolic BP Rehab --   RN cleared OT  -TB        Post SpO2 (%) 98  -TB        O2 Delivery Post Treatment supplemental O2  -TB        Pre Patient Position Supine  -TB        Intra Patient Position Side Lying  -TB        Post Patient Position Supine  -TB           Planned OT Interventions    Planned Therapy Interventions (OT Eval) strengthening exercise;transfer/mobility retraining;occupation/activity based interventions  -TB        Strengthening Exercise HEP  -TB           OT Goals    Transfer Goal Selection (OT) transfer, OT goal 1  -TB        Grooming Goal Selection (OT) grooming, OT goal 1  -TB        Strength Goal Selection (OT) strength, OT goal 1  -TB           Transfer Goal 1 (OT)    Activity/Assistive Device (Transfer Goal 1, OT) sit-to-stand/stand-to-sit;walker, rolling   to support transfer training/BSC  -TB        [place] Level/Cues Needed (Transfer Goal 1, OT) moderate assist (50-74% patient effort)   2 person assist; specialty bed - sling to chair for STS   -TB        Time Frame (Transfer Goal 1, OT) by discharge  -TB        Barriers (Transfers Goal 1, OT) strength  -TB        " Progress/Outcome (Transfer Goal 1, OT) goal ongoing  -TB           Grooming Goal 1 (OT)    Activity/Device (Grooming Goal 1, OT) wash face, hands;oral care;hair care   UIC  -TB        [place] (Grooming Goal 1, OT) set-up required;verbal cues required  -TB        Time Frame (Grooming Goal 1, OT) by discharge  -TB        Barriers (Grooming Goal 1, OT) strength, ROM  -TB        Progress/Outcome (Grooming Goal 1, OT) goal ongoing  -TB           Strength Goal 1 (OT)    Strength Goal 1 (OT) Pt completes daily Yellow theraband HEP 3x10-12 reps to support ADLs  -TB        Time Frame (Strength Goal 1, OT) by discharge  -TB        Barriers (Strength Goal 1, OT) strength  -TB        Progress/Outcome (Strength Goal 1, OT) goal ongoing  -TB           Living Environment    Home Accessibility --   No concerns at SNF  -TB          User Key  (r) = Recorded By, (t) = Taken By, (c) = Cosigned By    Initials Name Effective Dates    Aranza Cristobal, OT 06/08/18 -            Occupational Therapy Education     Title: PT OT SLP Therapies (Active)     Topic: Occupational Therapy (Done)     Point: ADL training (Done)     Description: Instruct learner(s) on proper safety adaptation and remediation techniques during self care or transfers.   Instruct in proper use of assistive devices.   Learning Progress Summary     Learner Status Readiness Method Response Comment Documented by    Patient Done Acceptance E VU,NR Education initiated for benefits of OOB activity/therapy, role of OT, and HEP TB 10/16/18 2346          Point: Home exercise program (Done)     Description: Instruct learner(s) on appropriate technique for monitoring, assisting and/or progressing therapeutic exercises/activities.   Learning Progress Summary     Learner Status Readiness Method Response Comment Documented by    Patient Done Acceptance E VU,NR Education initiated for benefits of OOB activity/therapy, role of OT, and HEP TB 10/16/18 6579                    "   User Key     Initials Effective Dates Name Provider Type Discipline    TB 06/08/18 -  Aranza Manzano, OT Occupational Therapist OT                  OT Recommendation and Plan  Outcome Summary/Treatment Plan (OT)  Anticipated Equipment Needs at Discharge (OT):  (None)  Anticipated Discharge Disposition (OT): skilled nursing facility (Return to SNF)  Patient/Family Concerns, Anticipated Discharge Disposition (OT): (S) Pt wants daily therapy at d/c - Wants MD to write for \"OOB daily\"  Planned Therapy Interventions (OT Eval): strengthening exercise, transfer/mobility retraining, occupation/activity based interventions  Therapy Frequency (OT Eval): 3 times/wk  Plan of Care Review  Plan of Care Reviewed With: patient  Plan of Care Reviewed With: patient  Outcome Summary: OT IE completed. Pt alert, Ox3 and cooperative with bed level activity.  Pt reports new onset R UE pain at IV site - RN and MD aware. Max A rolling in bed. Declined EOB/OOB. Dependent for LB ADLs. Max A bathing. Set-up grooming and self-feeding. OT to follow. D/C plan is return to SNF.          Outcome Measures     Row Name 10/16/18 1135 10/15/18 1312          How much help from another person do you currently need...    Turning from your back to your side while in flat bed without using bedrails?  -- 2  -KR     Moving from lying on back to sitting on the side of a flat bed without bedrails?  -- 1  -KR     Moving to and from a bed to a chair (including a wheelchair)?  -- 1  -KR     Standing up from a chair using your arms (e.g., wheelchair, bedside chair)?  -- 1  -KR     Climbing 3-5 steps with a railing?  -- 1  -KR     To walk in hospital room?  -- 1  -KR     AM-PAC 6 Clicks Score  -- 7  -KR        How much help from another is currently needed...    Putting on and taking off regular lower body clothing? 1  -TB  --     Bathing (including washing, rinsing, and drying) 2  -TB  --     Toileting (which includes using toilet bed pan or urinal) 1  " -TB  --     Putting on and taking off regular upper body clothing 2  -TB  --     Taking care of personal grooming (such as brushing teeth) 3  -TB  --     Eating meals 3  -TB  --     Score 12  -TB  --        Functional Assessment    Outcome Measure Options AM-PAC 6 Clicks Daily Activity (OT)  -TB AM-PAC 6 Clicks Basic Mobility (PT)  -KR       User Key  (r) = Recorded By, (t) = Taken By, (c) = Cosigned By    Initials Name Provider Type    TB Aranza Manzano OT Occupational Therapist    KR Tessa Napier, PT Physical Therapist          Time Calculation:         Time Calculation- OT     Row Name 10/16/18 1438             Time Calculation- OT    OT Start Time 1135  -TB      OT Received On 10/16/18  -TB      OT Goal Re-Cert Due Date 10/26/18  -TB        User Key  (r) = Recorded By, (t) = Taken By, (c) = Cosigned By    Initials Name Provider Type     Aranza Manzano OT Occupational Therapist        Therapy Suggested Charges     Code   Minutes Charges    None           Therapy Charges for Today     Code Description Service Date Service Provider Modifiers Qty    [id number]  OT EVAL MOD COMPLEXITY 3 10/16/2018 Aranza Manzano OT GO 1               Aranza Manzano OT  10/16/2018

## 2018-10-16 NOTE — PROGRESS NOTES
Continued Stay Note  [facility]     Patient Name: Joy Bueno  MRN: [id number]  Today's Date: 10/16/2018    Admit Date: 10/14/2018          Discharge Plan     Row Name 10/16/18 1131       Plan    Plan Comments TREVOR spoke with admission staff at [facility]. Staff at [facility] report pt does not currently have a bed hold, however pt will be able to come back to their facility when medically stable. Staff at [facility] request updated clinicals faxed tomorrow to [phone]. TREVOR will fax [facility] updated clinicals tomorrow.               Discharge Codes    No documentation.           ROGELIO Maloney

## 2018-10-16 NOTE — PROGRESS NOTES
[facility] Medicine Services  PROGRESS NOTE    Patient Name: Joy Bueno  : 1950  MRN: [id number]    Date of Admission: 10/14/2018  Length of Stay: 2  Primary Care Physician: Provider, No Known    Subjective   Subjective     CC: f/u sepsis/UTI and NATALIO    HPI:No acute events overnight, patient is awake and alert, however does endorse pain      Review of Systems  Gen- No fevers, chills  CV- No chest pain, palpitations  Resp- No cough, dyspnea  GI- No N/V/D, abd pain    Otherwise ROS is negative except as mentioned in the HPI.    Objective   Objective     Vital Signs:   Temp:  [96.5 °F (35.8 °C)-98 °F (36.7 °C)] 98 °F (36.7 °C)  Heart Rate:  [71-78] 71  Resp:  [18-20] 20  BP: (117-156)/(53-78) 117/77  FiO2 (%):  [24 %] 24 %        Physical Exam:  Constitutional: Chronically ill lady, in no acute distress, awake, alert  HENT: NCAT, mucous membranes moist, dry  Respiratory: Non-labored respirations, coarse BS, no wheezes,respiratory effort normal   Cardiovascular: RRR, no murmurs, rubs, or gallops, palpable pedal pulses bilaterally  Gastrointestinal: obese, positive bowel sounds, soft, nontender, nondistended  Musculoskeletal: No bilateral ankle edema  Psychiatric: Appropriate affect, cooperative  Neurologic: Oriented x 3, no focal deficits  Skin: No rashes    Results Reviewed:  I have personally reviewed current lab, radiology, and data and agree.      Results from last 7 days  Lab Units 10/16/18  0802 10/15/18  0633 10/14/18  1329   WBC 10*3/mm3 12.46* 11.79* 14.20*   HEMOGLOBIN g/dL 6.9* 5.1* 7.0*   HEMATOCRIT % 22.8* 23.8* 23.1*   PLATELETS 10*3/mm3 210 194 238       Results from last 7 days  Lab Units 10/16/18  0802 10/15/18  0625 10/14/18  1329   SODIUM mmol/L 143 144 144   POTASSIUM mmol/L 5.6* 5.8* 5.9*   CHLORIDE mmol/L 115* 115* 119*   CO2 mmol/L 20.0 20.0 18.0*   BUN mg/dL 78* 77* 73*   CREATININE mg/dL 4.42* 4.33* 4.29*   GLUCOSE mg/dL 147* 82 96   CALCIUM mg/dL 8.2*  8.2* 8.7   ALT (SGPT) U/L  --   --  14   AST (SGOT) U/L  --   --  15     Estimated Creatinine Clearance: 17.3 mL/min (A) (by C-G formula based on SCr of 4.42 mg/dL (H)).  BNP   Date Value Ref Range Status   10/14/2018 682.0 (H) 0.0 - 100.0 pg/mL Final     Comment:     Results may be falsely decreased if patient taking Biotin.       Microbiology Results Abnormal     Procedure Component Value - Date/Time    Blood Culture - Blood, [[id number]]  (Normal) Collected:  10/14/18 1606    Lab Status:  Preliminary result Specimen:  Blood from Arm, Left Updated:  10/15/18 1631     Blood Culture No growth at 24 hours    Blood Culture - Blood, [[id number]]  (Normal) Collected:  10/14/18 1606    Lab Status:  Preliminary result Specimen:  Blood from Arm, Right Updated:  10/15/18 1631     Blood Culture No growth at 24 hours    Urine Culture - Urine, [[id number]]  (Abnormal) Collected:  10/14/18 1340    Lab Status:  Preliminary result Specimen:  Urine from Urine, Catheter Updated:  10/15/18 0719     Urine Culture >100,000 CFU/mL Gram Negative Bacilli (A)          Imaging Results (last 24 hours)     ** No results found for the last 24 hours. **        Results for orders placed during the hospital encounter of 09/02/17   Adult Transthoracic Echo Complete    Narrative · Left ventricular wall thickness is consistent with mild concentric   hypertrophy.  · Left atrial cavity size is mildly dilated.  · Mild mitral valve regurgitation is present  · calcification of the aortic valve  · Mild tricuspid valve regurgitation is present.          I have reviewed the medications.      amLODIPine 10 mg Oral Daily   atorvastatin 10 mg Oral Nightly   budesonide-formoterol 2 puff Inhalation BID - RT   carvedilol 25 mg Oral BID With Meals   epoetin porter 20,000 Units Subcutaneous Once per day on Mon Wed Fri   ertapenem 500 mg Intravenous Q24H   heparin (porcine) 5,000 Units Subcutaneous Q12H   hydrALAZINE 100 mg Oral TID   insulin lispro 0-7 Units  Subcutaneous 4x Daily With Meals & Nightly   isosorbide mononitrate 30 mg Oral Daily   pantoprazole 40 mg Oral Q AM   Patiromer Sorbitex Calcium 16.8 g Oral Once   polyethylene glycol 17 g Oral Daily   saccharomyces boulardii 250 mg Oral BID   sertraline 50 mg Oral Daily   sodium bicarbonate 1,300 mg Oral BID   sodium chloride 3 mL Intravenous Q12H         Assessment/Plan   Assessment / Plan     Active Hospital Problems    Diagnosis   • Hyperkalemia   • Metabolic encephalopathy   • Severe sepsis (CMS/HCC)   • Sepsis (CMS/HCC)   • Anemia of chronic renal failure, stage 5 (CMS/HCC)   • Diabetes mellitus, type II (CMS/HCC)   • CKD (chronic kidney disease) stage 4, GFR 15-29 ml/min (CMS/[facility])   • Chronic heart failure (CMS/[facility])   • UTI (urinary tract infection)   • Leukocytosis   • Hypertension        Brief Hospital Course to date:  Joy Bueno is a 68 y.o. female past medical history of type 2 diabetes, significant stage IV, chronic heart failure, hypertension.  Presents to [facility] performed, which place with complaints for 5 days of lethargy, decreased by mouth intake, difficult to arouse.  Here patient has been admitted with sepsis and acute metabolic encephalopathy likely secondary to UTI.     Assessment & Plan:  - Severe sepsis likely secondary to UTI, continue IVF, f/u blood cultures currently on Invanz, ID has been consulted  - Acute encephalopathy, likely multifactorial sec to uremia vs sepsis, improving  - Hx of ESBL UTI continue abx as above, f/u urine cultures  - NATALIO on CKD stage 4, with metabolic and respiratory acidosis baseline Cr unknown, suspect ATN sec to sepsis, renal consulted, plan for tunneled cath placement and AVF creation for HD,  - Suspected decompensated HF with elevated BNP and hypoxia, continue O2, diuresis will be limited with her renal failure, will await renal consult  - Previously well controlled T2DM AC1 6.8% (8/3) continue ssi  - Anemia worse, no si/sx of acute blood loss, suspect ACD,  continue to monitor, s/p 3 unit PRBC transfuse Hg 6.9 this AM, transfuse 1 unit, trend  - Complex case     DVT Prophylaxis:  SQ    CODE STATUS:   Code Status and Medical Interventions:   Ordered at: 10/14/18 1942     Level Of Support Discussed With:    Patient     Code Status:    CPR     Medical Interventions (Level of Support Prior to Arrest):    Full       Disposition: TBD      Electronically signed by Paula Silver MD, 10/16/18, 10:02 AM.

## 2018-10-16 NOTE — PLAN OF CARE
Problem: Anemia (Adult)  Goal: Symptom Improvement  Outcome: Ongoing (interventions implemented as appropriate)

## 2018-10-16 NOTE — PLAN OF CARE
Problem: Patient Care Overview  Goal: Plan of Care Review  Outcome: Ongoing (interventions implemented as appropriate)   10/16/18 5969   Coping/Psychosocial   Plan of Care Reviewed With patient   OTHER   Outcome Summary OT IE completed. Pt alert, Ox3 and cooperative with bed level activity. Pt reports new onset R UE pain at IV site - RN and MD aware. Max A rolling in bed. Declined EOB/OOB. Dependent for LB ADLs. Max A bathing. Set-up grooming and self-feeding. OT to follow. D/C plan is return to SNF.

## 2018-10-16 NOTE — PLAN OF CARE
Problem: Patient Care Overview  Goal: Plan of Care Review  Outcome: Ongoing (interventions implemented as appropriate)   10/16/18 0214   Coping/Psychosocial   Plan of Care Reviewed With patient   Plan of Care Review   Progress no change       Problem: Fall Risk (Adult)  Goal: Absence of Fall  Outcome: Ongoing (interventions implemented as appropriate)   10/16/18 0214   Fall Risk (Adult)   Absence of Fall making progress toward outcome       Problem: Skin Injury Risk (Adult)  Goal: Skin Health and Integrity  Outcome: Ongoing (interventions implemented as appropriate)   10/16/18 0214   Skin Injury Risk (Adult)   Skin Health and Integrity making progress toward outcome

## 2018-10-16 NOTE — PLAN OF CARE
Problem: Anemia (Adult)  Goal: Identify Related Risk Factors and Signs and Symptoms  Outcome: Ongoing (interventions implemented as appropriate)   10/16/18 0211   Anemia (Adult)   Related Risk Factors (Anemia) chronic illness   Signs and Symptoms (Anemia) fatigue

## 2018-10-16 NOTE — PLAN OF CARE
Problem: Patient Care Overview  Goal: Plan of Care Review  Outcome: Ongoing (interventions implemented as appropriate)   10/16/18 1341   Coping/Psychosocial   Plan of Care Reviewed With patient   Plan of Care Review   Progress improving   OTHER   Outcome Summary Pt frequently seeks out staff. C/o frequent pain in various places. One unit of PRBC infused. Purewick remains in place.        Problem: Fall Risk (Adult)  Goal: Identify Related Risk Factors and Signs and Symptoms  Outcome: Ongoing (interventions implemented as appropriate)   10/16/18 1341   Fall Risk (Adult)   Related Risk Factors (Fall Risk) gait/mobility problems;environment unfamiliar   Signs and Symptoms (Fall Risk) presence of risk factors       Problem: Skin Injury Risk (Adult)  Goal: Identify Related Risk Factors and Signs and Symptoms  Outcome: Ongoing (interventions implemented as appropriate)   10/16/18 1341   Skin Injury Risk (Adult)   Related Risk Factors (Skin Injury Risk) body weight extremes;infection;moisture       Problem: Anemia (Adult)  Goal: Identify Related Risk Factors and Signs and Symptoms  Outcome: Ongoing (interventions implemented as appropriate)   10/16/18 1341   Anemia (Adult)   Related Risk Factors (Anemia) chronic illness   Signs and Symptoms (Anemia) fatigue

## 2018-10-16 NOTE — PROGRESS NOTES
"   LOS: 2 days    Patient Care Team:  Provider, No Known as PCP - General    Reason For Visit:  F/U STAGE 5 CKD.  Subjective           Review of Systems:    Pulm: No soa   CV:  No CP      Objective       amLODIPine 10 mg Oral Daily   atorvastatin 10 mg Oral Nightly   budesonide-formoterol 2 puff Inhalation BID - RT   carvedilol 25 mg Oral BID With Meals   epoetin porter 20,000 Units Subcutaneous Once per day on Mon Wed Fri   ertapenem 500 mg Intravenous Q24H   heparin (porcine) 5,000 Units Subcutaneous Q12H   hydrALAZINE 100 mg Oral TID   insulin lispro 0-7 Units Subcutaneous 4x Daily With Meals & Nightly   isosorbide mononitrate 30 mg Oral Daily   pantoprazole 40 mg Oral Q AM   Patiromer Sorbitex Calcium 16.8 g Oral Once   polyethylene glycol 17 g Oral Daily   saccharomyces boulardii 250 mg Oral BID   sertraline 50 mg Oral Daily   sodium bicarbonate 1,300 mg Oral BID   sodium chloride 3 mL Intravenous Q12H   sodium polystyrene 30 g Oral Once            Vital Signs:  Blood pressure 117/77, pulse 71, temperature 98 °F (36.7 °C), temperature source Oral, resp. rate 20, height 165.1 cm (65\"), weight 136 kg (300 lb), SpO2 99 %.    Flowsheet Rows      First Filed Value   Admission Height  165.1 cm (65\") Documented at 10/14/2018 1215   Admission Weight  136 kg (300 lb) Documented at 10/14/2018 1215          10/15 0701 - 10/16 0700  In: 1580 [P.O.:960]  Out: 800 [Urine:800]    Physical Exam:    General Appearance: NAD, alert and cooperative, Ox3  Eyes: PER, conjunctivae and sclerae normal, no icterus  Lungs: respirations regular and unlabored, no crepitus, clear to auscultation  Heart/CV: regular rhythm & normal rate, no murmur, no gallop, no rub and TR edema  Abdomen: not distended, soft, non-tender, no masses,  bowel sounds present  Skin: No rash, Warm and dry    Radiology:            Labs:    Results from last 7 days  Lab Units 10/16/18  0802 10/15/18  0633 10/14/18  1329   WBC 10*3/mm3 12.46* 11.79* 14.20*   HEMOGLOBIN " g/dL 6.9* 5.1* 7.0*   HEMATOCRIT % 22.8* 23.8* 23.1*   PLATELETS 10*3/mm3 210 194 238       Results from last 7 days  Lab Units 10/16/18  0802 10/15/18  0625 10/14/18  1329   SODIUM mmol/L 143 144 144   POTASSIUM mmol/L 5.6* 5.8* 5.9*   CHLORIDE mmol/L 115* 115* 119*   CO2 mmol/L 20.0 20.0 18.0*   BUN mg/dL 78* 77* 73*   CREATININE mg/dL 4.42* 4.33* 4.29*   CALCIUM mg/dL 8.2* 8.2* 8.7   PHOSPHORUS mg/dL 6.4*  --   --    MAGNESIUM mg/dL 1.5 1.3  --    ALBUMIN g/dL 3.12*  --  3.37       Results from last 7 days  Lab Units 10/16/18  0802   GLUCOSE mg/dL 147*         Results from last 7 days  Lab Units 10/14/18  1329   ALK PHOS U/L 86   BILIRUBIN mg/dL 0.2*   ALT (SGPT) U/L 14   AST (SGOT) U/L 15       Results from last 7 days  Lab Units 10/14/18  1851   PH, ARTERIAL pH units 7.186*   PO2 ART mm Hg 127.0*   PCO2, ARTERIAL mm Hg 48.1*   HCO3 ART mmol/L 18.2*         Results from last 7 days  Lab Units 10/14/18  1340   COLOR UA  Yellow   CLARITY UA  Turbid*   PH, URINE  6.0   SPECIFIC GRAVITY, URINE  1.020   GLUCOSE UA  Negative   KETONES UA  Negative   BILIRUBIN UA  Negative   PROTEIN UA  100 mg/dL (2+)*   BLOOD UA  Small (1+)*   LEUKOCYTES UA  Large (3+)*   NITRITE UA  Negative       Estimated Creatinine Clearance: 17.3 mL/min (A) (by C-G formula based on SCr of 4.42 mg/dL (H)).      Assessment       Hypertension    Leukocytosis    UTI (urinary tract infection)    Chronic heart failure (CMS/[facility])    CKD (chronic kidney disease) stage 4, GFR 15-29 ml/min (CMS/[facility])    Diabetes mellitus, type II (CMS/[facility])    Anemia of chronic renal failure, stage 5 (CMS/[facility])    Hyperkalemia    Metabolic encephalopathy    Severe sepsis (CMS/[facility])    Sepsis (CMS/[facility])            Impression: STAGE 5 CKD. ANEMIA. HYPERKALEMIA.             Recommendations: TUNNELED HD CATH SOON AND START DIALYSIS.      Rocky Nova MD  10/16/18  11:46 AM

## 2018-10-17 ENCOUNTER — APPOINTMENT (OUTPATIENT)
Dept: GENERAL RADIOLOGY | Facility: HOSPITAL | Age: 68
End: 2018-10-17

## 2018-10-17 ENCOUNTER — ANESTHESIA (OUTPATIENT)
Dept: PERIOP | Facility: HOSPITAL | Age: 68
End: 2018-10-17

## 2018-10-17 LAB
ABO + RH BLD: NORMAL
BH BB BLOOD EXPIRATION DATE: NORMAL
BH BB BLOOD TYPE BARCODE: 7300
BH BB DISPENSE STATUS: NORMAL
BH BB PRODUCT CODE: NORMAL
BH BB UNIT NUMBER: NORMAL
GLUCOSE BLDC GLUCOMTR-MCNC: 102 MG/DL (ref 70–130)
GLUCOSE BLDC GLUCOMTR-MCNC: 120 MG/DL (ref 70–130)
GLUCOSE BLDC GLUCOMTR-MCNC: 121 MG/DL (ref 70–130)
GLUCOSE BLDC GLUCOMTR-MCNC: 138 MG/DL (ref 70–130)
HCT VFR BLD AUTO: 29 % (ref 34.5–44)
HGB BLD-MCNC: 9 G/DL (ref 11.5–15.5)
UNIT  ABO: NORMAL
UNIT  RH: NORMAL

## 2018-10-17 PROCEDURE — 25010000002 HEPARIN (PORCINE) PER 1000 UNITS: Performed by: SURGERY

## 2018-10-17 PROCEDURE — 94799 UNLISTED PULMONARY SVC/PX: CPT

## 2018-10-17 PROCEDURE — C1750 CATH, HEMODIALYSIS,LONG-TERM: HCPCS | Performed by: SURGERY

## 2018-10-17 PROCEDURE — B518ZZA FLUOROSCOPY OF SUPERIOR VENA CAVA, GUIDANCE: ICD-10-PCS | Performed by: SURGERY

## 2018-10-17 PROCEDURE — 82962 GLUCOSE BLOOD TEST: CPT

## 2018-10-17 PROCEDURE — P9016 RBC LEUKOCYTES REDUCED: HCPCS

## 2018-10-17 PROCEDURE — 86900 BLOOD TYPING SEROLOGIC ABO: CPT

## 2018-10-17 PROCEDURE — 76000 FLUOROSCOPY <1 HR PHYS/QHP: CPT

## 2018-10-17 PROCEDURE — 71045 X-RAY EXAM CHEST 1 VIEW: CPT

## 2018-10-17 PROCEDURE — 0JH63XZ INSERTION OF TUNNELED VASCULAR ACCESS DEVICE INTO CHEST SUBCUTANEOUS TISSUE AND FASCIA, PERCUTANEOUS APPROACH: ICD-10-PCS | Performed by: SURGERY

## 2018-10-17 PROCEDURE — 94640 AIRWAY INHALATION TREATMENT: CPT

## 2018-10-17 PROCEDURE — 25010000002 HEPARIN (PORCINE) PER 1000 UNITS: Performed by: NURSE PRACTITIONER

## 2018-10-17 PROCEDURE — 36430 TRANSFUSION BLD/BLD COMPNT: CPT

## 2018-10-17 PROCEDURE — 85018 HEMOGLOBIN: CPT | Performed by: NURSE PRACTITIONER

## 2018-10-17 PROCEDURE — 02HV33Z INSERTION OF INFUSION DEVICE INTO SUPERIOR VENA CAVA, PERCUTANEOUS APPROACH: ICD-10-PCS | Performed by: SURGERY

## 2018-10-17 PROCEDURE — 25010000002 PROPOFOL 10 MG/ML EMULSION: Performed by: ANESTHESIOLOGY

## 2018-10-17 PROCEDURE — 85014 HEMATOCRIT: CPT | Performed by: NURSE PRACTITIONER

## 2018-10-17 PROCEDURE — 99233 SBSQ HOSP IP/OBS HIGH 50: CPT | Performed by: INTERNAL MEDICINE

## 2018-10-17 RX ORDER — SODIUM CHLORIDE 0.9 % (FLUSH) 0.9 %
3-10 SYRINGE (ML) INJECTION AS NEEDED
Status: DISCONTINUED | OUTPATIENT
Start: 2018-10-17 | End: 2018-10-17 | Stop reason: HOSPADM

## 2018-10-17 RX ORDER — LIDOCAINE HYDROCHLORIDE 10 MG/ML
0.5 INJECTION, SOLUTION EPIDURAL; INFILTRATION; INTRACAUDAL; PERINEURAL ONCE AS NEEDED
Status: DISCONTINUED | OUTPATIENT
Start: 2018-10-17 | End: 2018-10-17 | Stop reason: HOSPADM

## 2018-10-17 RX ORDER — PROPOFOL 10 MG/ML
VIAL (ML) INTRAVENOUS CONTINUOUS PRN
Status: DISCONTINUED | OUTPATIENT
Start: 2018-10-17 | End: 2018-10-17 | Stop reason: SURG

## 2018-10-17 RX ORDER — SODIUM CHLORIDE, SODIUM LACTATE, POTASSIUM CHLORIDE, CALCIUM CHLORIDE 600; 310; 30; 20 MG/100ML; MG/100ML; MG/100ML; MG/100ML
9 INJECTION, SOLUTION INTRAVENOUS CONTINUOUS
Status: DISCONTINUED | OUTPATIENT
Start: 2018-10-17 | End: 2018-10-23 | Stop reason: HOSPADM

## 2018-10-17 RX ORDER — ALBUMIN (HUMAN) 12.5 G/50ML
12.5 SOLUTION INTRAVENOUS AS NEEDED
Status: ACTIVE | OUTPATIENT
Start: 2018-10-18 | End: 2018-10-18

## 2018-10-17 RX ORDER — FENTANYL CITRATE 50 UG/ML
50 INJECTION, SOLUTION INTRAMUSCULAR; INTRAVENOUS
Status: DISCONTINUED | OUTPATIENT
Start: 2018-10-17 | End: 2018-10-17 | Stop reason: HOSPADM

## 2018-10-17 RX ORDER — SODIUM CHLORIDE 9 MG/ML
INJECTION, SOLUTION INTRAVENOUS CONTINUOUS PRN
Status: DISCONTINUED | OUTPATIENT
Start: 2018-10-17 | End: 2018-10-17 | Stop reason: SURG

## 2018-10-17 RX ORDER — SODIUM CHLORIDE 0.9 % (FLUSH) 0.9 %
3 SYRINGE (ML) INJECTION EVERY 12 HOURS SCHEDULED
Status: DISCONTINUED | OUTPATIENT
Start: 2018-10-17 | End: 2018-10-17 | Stop reason: HOSPADM

## 2018-10-17 RX ORDER — BUPIVACAINE HYDROCHLORIDE 5 MG/ML
INJECTION, SOLUTION EPIDURAL; INTRACAUDAL AS NEEDED
Status: DISCONTINUED | OUTPATIENT
Start: 2018-10-17 | End: 2018-10-17 | Stop reason: HOSPADM

## 2018-10-17 RX ORDER — HYDROMORPHONE HYDROCHLORIDE 1 MG/ML
0.5 INJECTION, SOLUTION INTRAMUSCULAR; INTRAVENOUS; SUBCUTANEOUS
Status: DISCONTINUED | OUTPATIENT
Start: 2018-10-17 | End: 2018-10-17 | Stop reason: HOSPADM

## 2018-10-17 RX ADMIN — ATORVASTATIN CALCIUM 10 MG: 10 TABLET, FILM COATED ORAL at 21:37

## 2018-10-17 RX ADMIN — SODIUM CHLORIDE: 9 INJECTION, SOLUTION INTRAVENOUS at 17:58

## 2018-10-17 RX ADMIN — Medication 3 ML: at 21:38

## 2018-10-17 RX ADMIN — BUDESONIDE AND FORMOTEROL FUMARATE DIHYDRATE 2 PUFF: 160; 4.5 AEROSOL RESPIRATORY (INHALATION) at 09:36

## 2018-10-17 RX ADMIN — PROPOFOL 50 MCG/KG/MIN: 10 INJECTION, EMULSION INTRAVENOUS at 18:09

## 2018-10-17 RX ADMIN — HYDRALAZINE HYDROCHLORIDE 100 MG: 50 TABLET, FILM COATED ORAL at 09:10

## 2018-10-17 RX ADMIN — ACETAMINOPHEN 650 MG: 325 TABLET ORAL at 23:07

## 2018-10-17 RX ADMIN — PANTOPRAZOLE SODIUM 40 MG: 40 TABLET, DELAYED RELEASE ORAL at 05:57

## 2018-10-17 RX ADMIN — ISOSORBIDE MONONITRATE 30 MG: 30 TABLET, EXTENDED RELEASE ORAL at 09:10

## 2018-10-17 RX ADMIN — Medication 250 MG: at 21:37

## 2018-10-17 RX ADMIN — HEPARIN SODIUM 5000 UNITS: 5000 INJECTION, SOLUTION INTRAVENOUS; SUBCUTANEOUS at 09:11

## 2018-10-17 RX ADMIN — BUDESONIDE AND FORMOTEROL FUMARATE DIHYDRATE 2 PUFF: 160; 4.5 AEROSOL RESPIRATORY (INHALATION) at 21:16

## 2018-10-17 RX ADMIN — Medication 250 MG: at 09:10

## 2018-10-17 RX ADMIN — SERTRALINE HYDROCHLORIDE 50 MG: 50 TABLET ORAL at 09:10

## 2018-10-17 RX ADMIN — HEPARIN SODIUM 5000 UNITS: 5000 INJECTION, SOLUTION INTRAVENOUS; SUBCUTANEOUS at 21:37

## 2018-10-17 RX ADMIN — HYDRALAZINE HYDROCHLORIDE 100 MG: 50 TABLET, FILM COATED ORAL at 21:38

## 2018-10-17 RX ADMIN — SODIUM BICARBONATE 650 MG TABLET 1300 MG: at 21:37

## 2018-10-17 RX ADMIN — AMLODIPINE BESYLATE 10 MG: 10 TABLET ORAL at 09:10

## 2018-10-17 RX ADMIN — SODIUM BICARBONATE 650 MG TABLET 1300 MG: at 09:10

## 2018-10-17 NOTE — ANESTHESIA PREPROCEDURE EVALUATION
Anesthesia Evaluation     Patient summary reviewed and Nursing notes reviewed                Airway   Mallampati: III  TM distance: >3 FB  Neck ROM: full  Possible difficult intubation  Dental - normal exam     Pulmonary - normal exam   (+) asthma,   Cardiovascular - normal exam    (+) hypertension,       Neuro/Psych  (+) psychiatric history,     GI/Hepatic/Renal/Endo    (+) morbid obesity,  renal disease ARF and CRI, diabetes mellitus,     Musculoskeletal     Abdominal  - normal exam    Bowel sounds: normal.   Substance History - negative use     OB/GYN negative ob/gyn ROS         Other   (+) arthritis       Other Comment: HCT 29                Anesthesia Plan    ASA 4     MAC     intravenous induction   Anesthetic plan, all risks, benefits, and alternatives have been provided, discussed and informed consent has been obtained with: patient.    Plan discussed with CRNA.

## 2018-10-17 NOTE — PROGRESS NOTES
[facility] Medicine Services  PROGRESS NOTE    Patient Name: Joy Bueno  : 1950  MRN: [id number]    Date of Admission: 10/14/2018  Length of Stay: 3  Primary Care Physician: Provider, No Known    Subjective   Subjective     CC: f/u for sepsis and NATALIO    HPI: No acute events overnight.  Patient said that she slept okay.  Does endorse some right arm pain.  Shortness of air is improved.      Review of Systems  Gen- No fevers, chills  CV- No chest pain, palpitations  Resp- No cough, dyspnea  GI- No N/V/D, abd pain    Otherwise ROS is negative except as mentioned in the HPI.    Objective   Objective     Vital Signs:   Temp:  [97 °F (36.1 °C)-98.6 °F (37 °C)] 97.1 °F (36.2 °C)  Heart Rate:  [60-75] 72  Resp:  [16-20] 16  BP: (103-159)/(44-67) 149/67     Physical Exam:  Constitutional: Chronically ill lady, in no acute distress, awake, alert  HENT: NCAT, mucous membranes dry.  Respiratory: Nonlabored respirations, no rhonchi no wheezes.   Cardiovascular: RRR, no murmurs, rubs, or gallops, palpable pedal pulses bilaterally  Gastrointestinal: Obese, positive bowel sounds, soft, nontender, nondistended  Musculoskeletal: No bilateral ankle edema  Psychiatric: Appropriate affect, cooperative  Neurologic: Oriented x 3, no focal deficits  Skin: No rashes    Results Reviewed:  I have personally reviewed current lab, radiology, and data and agree.      Results from last 7 days  Lab Units 10/17/18  0543 10/16/18  1936 10/16/18  0802 10/15/18  0633 10/14/18  1329   WBC 10*3/mm3  --   --  12.46* 11.79* 14.20*   HEMOGLOBIN g/dL 9.0* 6.8* 6.9* 5.1* 7.0*   HEMATOCRIT % 29.0* 22.9* 22.8* 23.8* 23.1*   PLATELETS 10*3/mm3  --   --  210 194 238       Results from last 7 days  Lab Units 10/16/18  0802 10/15/18  0625 10/14/18  1329   SODIUM mmol/L 143 144 144   POTASSIUM mmol/L 5.6* 5.8* 5.9*   CHLORIDE mmol/L 115* 115* 119*   CO2 mmol/L 20.0 20.0 18.0*   BUN mg/dL 78* 77* 73*   CREATININE mg/dL 4.42*  4.33* 4.29*   GLUCOSE mg/dL 147* 82 96   CALCIUM mg/dL 8.2* 8.2* 8.7   ALT (SGPT) U/L  --   --  14   AST (SGOT) U/L  --   --  15     Estimated Creatinine Clearance: 17.3 mL/min (A) (by C-G formula based on SCr of 4.42 mg/dL (H)).  BNP   Date Value Ref Range Status   10/14/2018 682.0 (H) 0.0 - 100.0 pg/mL Final     Comment:     Results may be falsely decreased if patient taking Biotin.       Microbiology Results Abnormal     Procedure Component Value - Date/Time    Blood Culture - Blood, [[id number]]  (Normal) Collected:  10/14/18 1606    Lab Status:  Preliminary result Specimen:  Blood from Arm, Right Updated:  10/16/18 1631     Blood Culture No growth at 2 days    Blood Culture - Blood, [[id number]]  (Normal) Collected:  10/14/18 1606    Lab Status:  Preliminary result Specimen:  Blood from Arm, Left Updated:  10/16/18 1631     Blood Culture No growth at 2 days    Urine Culture - Urine, [[id number]]  (Abnormal)  (Susceptibility) Collected:  10/14/18 1340    Lab Status:  Final result Specimen:  Urine from Urine, Catheter Updated:  10/16/18 1403     Urine Culture >100,000 CFU/mL Klebsiella pneumoniae ESBL (C)     Comment:   Consider infectious disease consult.  Susceptibility results may not correlate to clinical outcomes.       Susceptibility      Klebsiella pneumoniae ESBL     AL     Ciprofloxacin >2 ug/ml Resistant     Ertapenem <=1 ug/ml Susceptible     Gentamicin <=4 ug/ml Susceptible     Levofloxacin >4 ug/ml Resistant     Meropenem <=1 ug/ml Susceptible     Nitrofurantoin 64 ug/ml Intermediate     Piperacillin + Tazobactam <=16 ug/ml Susceptible     Tetracycline >8 ug/ml Resistant     Tobramycin >8 ug/ml Resistant     Trimethoprim + Sulfamethoxazole >2/38 ug/ml Resistant                        Results for orders placed during the hospital encounter of 09/02/17   Adult Transthoracic Echo Complete    Narrative · Left ventricular wall thickness is consistent with mild concentric   hypertrophy.  · Left atrial cavity  size is mildly dilated.  · Mild mitral valve regurgitation is present  · calcification of the aortic valve  · Mild tricuspid valve regurgitation is present.          I have reviewed the medications.      amLODIPine 10 mg Oral Daily   atorvastatin 10 mg Oral Nightly   budesonide-formoterol 2 puff Inhalation BID - RT   carvedilol 25 mg Oral BID With Meals   epoetin porter 20,000 Units Subcutaneous Once per day on Mon Wed Fri   ertapenem 500 mg Intravenous Q24H   heparin (porcine) 5,000 Units Subcutaneous Q12H   hydrALAZINE 100 mg Oral TID   insulin lispro 0-7 Units Subcutaneous 4x Daily With Meals & Nightly   isosorbide mononitrate 30 mg Oral Daily   pantoprazole 40 mg Oral Q AM   Patiromer Sorbitex Calcium 16.8 g Oral Once   polyethylene glycol 17 g Oral Daily   saccharomyces boulardii 250 mg Oral BID   sertraline 50 mg Oral Daily   sodium bicarbonate 1,300 mg Oral BID   sodium chloride 3 mL Intravenous Q12H     Assessment/Plan   Assessment / Plan     Active Hospital Problems    Diagnosis   • Hyperkalemia   • Metabolic encephalopathy   • Severe sepsis (CMS/HCC)   • Sepsis (CMS/[facility])   • Anemia of chronic renal failure, stage 5 (CMS/HCC)   • Diabetes mellitus, type II (CMS/HCC)   • CKD (chronic kidney disease) stage 4, GFR 15-29 ml/min (CMS/HCC)   • Chronic heart failure (CMS/HCC)   • UTI (urinary tract infection)   • Leukocytosis   • Hypertension     Brief Hospital Course to date:  Joy Bueno is a 68 y.o. female past medical history of type 2 diabetes, significant stage IV, chronic heart failure, hypertension.  Presents to [facility], which place with complaints for 5 days of lethargy, decreased by mouth intake, difficult to arouse.  Here patient has been admitted with sepsis and acute metabolic encephalopathy likely secondary to UTI.     Assessment & Plan:  - Severe sepsis likely secondary to ESBL UTI, continue IVF, f/u blood cultures currently on Invanz, ID following  - Acute encephalopathy resolving, likely  multifactorial sec to uremia vs sepsis, improving  - NATALIO on CKD stage 4, with metabolic and respiratory acidosis baseline Cr unknown, suspect ATN sec to sepsis, renal consulted, plan for tunneled cath placement today and AVF creation for HD,  - Suspected decompensated HF with elevated BNP and hypoxia, continue O2, diuresis will be limited with her renal failure, will await renal consult  - Previously well controlled T2DM AC1 6.8% (8/3) continue ssi  - Anemia worse, no si/sx of acute blood loss, suspect ACD, continue to monitor, s/p 3 unit PRBC transfuse Hg 6.9 this AM, transfuse 1 unit, trend  - Complex case     DVT Prophylaxis:  [facility]    CODE STATUS:   Code Status and Medical Interventions:   Ordered at: 10/14/18 1942     Level Of Support Discussed With:    Patient     Code Status:    CPR     Medical Interventions (Level of Support Prior to Arrest):    Full     Disposition: TBD    Electronically signed by Paula Silver MD, 10/17/18, 10:09 AM.

## 2018-10-17 NOTE — PLAN OF CARE
Problem: Fall Risk (Adult)  Goal: Identify Related Risk Factors and Signs and Symptoms  Outcome: Ongoing (interventions implemented as appropriate)   10/17/18 7026   Fall Risk (Adult)   Related Risk Factors (Fall Risk) age-related changes;confusion/agitation;fatigue/slow reaction;fear of falling

## 2018-10-17 NOTE — ANESTHESIA POSTPROCEDURE EVALUATION
Patient: Joy Bueno    Procedure Summary     Date:  10/17/18 Room / Location:   [facility] /  [facility]    Anesthesia Start:  1758 Anesthesia Stop:  1833    Procedure:  HEMODIALYSIS CATHETER INSERTION (N/A ) Diagnosis:      Surgeon:  Carlos Enrique Calderón MD Provider:  Daquan Whitney MD    Anesthesia Type:  MAC ASA Status:  4          Anesthesia Type: MAC  Last vitals  BP   130/64 (10/17/18 1607)   Temp   97.6 °F (36.4 °C) (10/17/18 1208)   Pulse   74 (10/17/18 1607)   Resp   14 (10/17/18 1607)     SpO2   98 % (10/17/18 1608)     Post Anesthesia Care and Evaluation    Patient location during evaluation: PACU  Patient participation: complete - patient participated  Level of consciousness: awake and alert  Pain score: 0  Pain management: adequate  Airway patency: patent  Anesthetic complications: No anesthetic complications  PONV Status: none  Cardiovascular status: hemodynamically stable and acceptable  Respiratory status: nonlabored ventilation, acceptable and nasal cannula  Hydration status: acceptable

## 2018-10-17 NOTE — PROGRESS NOTES
INFECTIOUS DISEASE PROGRESS NOTE    Joy Bueno  1950  [id number]      Admission Date: 10/14/2018      Requesting Provider: No ref. provider found  Evaluating Physician: Velasquez Olivo MD    Reason for Consultation:  ESBL Ecoli  UTI      History of present illness:    10/15/18: Patient is a 68 y.o. female seen today for a UTI.  She was brought from her nursing home with increasing lethargy, and  confusion over the past several days.  She was hospitalized here in August and treated with Invanz for an ESBL Ecoli UTI.  On admission, her urinalysis had too  Numerous to count white cells, and urine culture now with Gram negative rods . She is hypothermic,  With a leukocytosis of 14, 000,  hgb of 7.0, Scr 4.29. Ertapenem was initiated and we were consulted for evaluation and treatment.    10/16/18: She denies nausea and vomiting.  She has decreased dyspnea.  She is scheduled for hemodialysis access by Dr. Calderón.  I discussed her situation with Dr. Calderón today.  10/17/18:  She is now on nasal cannula.  She will  Have her dialysis access later today.  Remains afebrile.     Past Medical History:   Diagnosis Date   • Anemia    • Anxiety    • Asthma    • Chronic kidney disease    • Diabetes mellitus (CMS/[facility])    • Diabetes mellitus, type II (CMS/[facility]) 8/3/2018   • History of Clostridium difficile infection 8/3/2018   • History of respiratory failure, since off home oxygen 8/3/2018   • History of UTI 8/3/2018   • Hypertension    • Morbid obesity (CMS/[facility])    • Osteoarthritis    • Tinea capitis 8/3/2018       Past Surgical History:   Procedure Laterality Date   •  SECTION     • COLONOSCOPY N/A 2017    Procedure: COLONOSCOPY;  Surgeon: Mark I Brunner, MD;  Location:  [facility] END[facility];  Service:    • ENDOSCOPY N/A 2017    Procedure: ESOPHAGOGASTRODUODENOSCOPY ;  Surgeon: Velasquez Call MD;  Location:  [facility] ENDOSCOPY;  Service:    • HERNIA REPAIR         Family History   Problem Relation Age of  Onset   • Cardiomyopathy Mother    • Stroke Father    • Diabetes Father        Social History     Social History   • Marital status:      Spouse name: N/A   • Number of children: N/A   • Years of education: N/A     Occupational History   • Not on file.     Social History Main Topics   • Smoking status: Former Smoker     Packs/day: 0.25   • Smokeless tobacco: Not on file      Comment: unknown when quit, maybe last year   • Alcohol use No   • Drug use: No   • Sexual activity: No     Other Topics Concern   • Not on file     Social History Narrative    Mrs. Bueno is a 68 year old AA  female. She lives alone in [place] but has been in rehab for some time. She has a daughter. She does not have an advanced directive.       Allergies   Allergen Reactions   • Geodon [Ziprasidone Hcl] Unknown (See Comments)     PT DOESN'T REMEMBER   • Haldol [Haloperidol Lactate] Unknown (See Comments)     PT DOESN'T REMEMBER   • Seroquel [Quetiapine Fumarate] Unknown (See Comments)     PT DOESN'T REMEMBER         Medication:    Current Facility-Administered Medications:   •  [MAR Hold] acetaminophen (TYLENOL) tablet 650 mg, 650 mg, Oral, Q4H PRN, Doris Heard APRN, 650 mg at 10/16/18 1332  •  amLODIPine (NORVASC) tablet 10 mg, 10 mg, Oral, Daily, Doris Heard APRN, 10 mg at 10/17/18 0910  •  [MAR Hold] atorvastatin (LIPITOR) tablet 10 mg, 10 mg, Oral, Nightly, Doris Heard, APRN, 10 mg at 10/16/18 2051  •  [MAR Hold] budesonide-formoterol (SYMBICORT) 160-4.5 MCG/ACT inhaler 2 puff, 2 puff, Inhalation, BID - RT, Doris Heard APRN, 2 puff at 10/17/18 0936  •  carvedilol (COREG) tablet 25 mg, 25 mg, Oral, BID With Meals, Doris Heard APRN, Stopped at 10/17/18 0911  •  [MAR Hold] dextrose (D50W) 25 g/ 50mL Intravenous Solution 25 g, 25 g, Intravenous, Q15 Min PRN, Doris Heard APRN  •  [MAR Hold] dextrose (GLUTOSE) oral gel 15 g, 15 g, Oral, Q15 Min PRN, Doris Heard, SUELLEN  •  [MAR Hold]  epoetin porter (EPOGEN,PROCRIT) injection 20,000 Units, 20,000 Units, Subcutaneous, Once per day on Mon Wed Fri, Rocky Nova MD, Stopped at 10/17/18 0911  •  [MAR Hold] ertapenem (INVanz) 500 mg in sodium chloride 0.9 % 50 mL IVPB, 500 mg, Intravenous, Q24H, Velasquez Olivo MD, Last Rate: 100 mL/hr at 10/16/18 1730, 500 mg at 10/16/18 1730  •  [MAR Hold] glucagon (human recombinant) (GLUCAGEN DIAGNOSTIC) injection 1 mg, 1 mg, Subcutaneous, PRN, Doris Heard, APRN  •  [MAR Hold] heparin (porcine) 5000 UNIT/ML injection 5,000 Units, 5,000 Units, Subcutaneous, Q12H, Doris Heard, APRN, 5,000 Units at 10/17/18 0911  •  hydrALAZINE (APRESOLINE) tablet 100 mg, 100 mg, Oral, TID, Doris Heard APRN, 100 mg at 10/17/18 0910  •  [MAR Hold] Influenza Vac Subunit Quad (FLUCELVAX) injection 0.5 mL, 0.5 mL, Intramuscular, During Hospitalization, Paula Silver MD  •  [MAR Hold] insulin lispro (humaLOG) injection 0-7 Units, 0-7 Units, Subcutaneous, 4x Daily With Meals & Nightly, Doris Heard, APRN, 2 Units at 10/16/18 1731  •  [MAR Hold] isosorbide mononitrate (IMDUR) 24 hr tablet 30 mg, 30 mg, Oral, Daily, Doris Heard, APRN, 30 mg at 10/17/18 0910  •  lactated ringers infusion, 9 mL/hr, Intravenous, Continuous, Jesus Benavides MD  •  lidocaine PF 1% (XYLOCAINE) injection 0.5 mL, 0.5 mL, Injection, Once PRN, Jesus Benavides MD  •  [MAR Hold] ondansetron (ZOFRAN) tablet 4 mg, 4 mg, Oral, Q6H PRN **OR** [MAR Hold] ondansetron (ZOFRAN) injection 4 mg, 4 mg, Intravenous, Q6H PRN, Doris Heard, SUELLEN  •  [MAR Hold] pantoprazole (PROTONIX) EC tablet 40 mg, 40 mg, Oral, Q AM, Doris Heard APRN, 40 mg at 10/17/18 0557  •  [MAR Hold] Patiromer Sorbitex Calcium pack 1 packet, 16.8 g, Oral, Once, Chaparrita Olmedo, [facility], Stopped at 10/15/18 1632  •  [MAR Hold] polyethylene glycol 3350 powder (packet), 17 g, Oral, Daily, Doris Heard APRN, Stopped at 10/17/18 0912  •  [MAR Hold]  saccharomyces boulardii (FLORASTOR) capsule 250 mg, 250 mg, Oral, BID, Doris Heard, APRN, 250 mg at 10/17/18 0910  •  [MAR Hold] sertraline (ZOLOFT) tablet 50 mg, 50 mg, Oral, Daily, Doris Heard APRN, 50 mg at 10/17/18 0910  •  [MAR Hold] sodium bicarbonate tablet 1,300 mg, 1,300 mg, Oral, BID, Rocky Nova MD, 1,300 mg at 10/17/18 0910  •  [MAR Hold] sodium chloride 0.9 % flush 3 mL, 3 mL, Intravenous, Q12H, Doris Heard APRN, 3 mL at 10/16/18 2057  •  sodium chloride 0.9 % flush 3 mL, 3 mL, Intravenous, Q12H, Jesus Benavides MD  •  [MAR Hold] sodium chloride 0.9 % flush 3-10 mL, 3-10 mL, Intravenous, PRN, Doris Heard APRN  •  sodium chloride 0.9 % flush 3-10 mL, 3-10 mL, Intravenous, PRN, Jesus Benavides MD    Antibiotics:  Anti-Infectives     Ordered     Dose/Rate Route Frequency Start Stop    10/15/18 0920  [MAR Hold]  ertapenem (INVanz) 500 mg in sodium chloride 0.9 % 50 mL IVPB     (MAR Hold since 10/17/18 1604)   Ordering Provider:  Velasquez Olivo MD    500 mg  100 mL/hr over 30 Minutes Intravenous Every 24 Hours 10/15/18 1800 10/20/18 1759    10/14/18 1512  ertapenem (INVanz) 1 g/100 mL 0.9% NS VTB (mbp)     Ordering Provider:  Raheel Brunson PA    1 g  over 30 Minutes Intravenous Once 10/14/18 1514 10/14/18 1701            Review of Systems:  No reliable ROS from patient     Physical Exam:   Vital Signs  Temp (24hrs), Av.7 °F (36.5 °C), Min:97.1 °F (36.2 °C), Max:98.1 °F (36.7 °C)    Temp  Min: 97.1 °F (36.2 °C)  Max: 98.1 °F (36.7 °C)  BP  Min: 103/44  Max: 159/67  Pulse  Min: 68  Max: 77  Resp  Min: 14  Max: 20  SpO2  Min: 94 %  Max: 100 %    GENERAL drowsy .  She is in no acute distress..   HEENT: Normocephalic, atraumatic.  PERRL. EOMI. No conjunctival injection. No icterus. Oropharynx clear without evidence of thrush or exudate.   NECK: Supple without nuchal rigidity. No mass.  LYMPH: No cervical, axillary or inguinal lymphadenopathy.  HEART: RRR; No  murmur, rubs, gallops.   LUNGS:  Few rhonchi  Anteriorly   ABDOMEN: Soft, nontender,firm,  bowel sounds quiet  No rebound or guarding. NO mass or HSM.  EXT:  No cyanosis, clubbing or edema. No cord.  : Genitalia generally unremarkable. purewick in place   MSK: FROM without joint effusions noted arms/legs.    SKIN: Warm and dry  Scalp eczema,   Neuro: drowsy but is confused.    Laboratory Data      Results from last 7 days  Lab Units 10/17/18  0543 10/16/18  1936 10/16/18  0802 10/15/18  0633 10/14/18  1329   WBC 10*3/mm3  --   --  12.46* 11.79* 14.20*   HEMOGLOBIN g/dL 9.0* 6.8* 6.9* 5.1* 7.0*   HEMATOCRIT % 29.0* 22.9* 22.8* 23.8* 23.1*   PLATELETS 10*3/mm3  --   --  210 194 238       Results from last 7 days  Lab Units 10/16/18  0802   SODIUM mmol/L 143   POTASSIUM mmol/L 5.6*   CHLORIDE mmol/L 115*   CO2 mmol/L 20.0   BUN mg/dL 78*   CREATININE mg/dL 4.42*   GLUCOSE mg/dL 147*   CALCIUM mg/dL 8.2*       Results from last 7 days  Lab Units 10/14/18  1329   ALK PHOS U/L 86   BILIRUBIN mg/dL 0.2*   ALT (SGPT) U/L 14   AST (SGOT) U/L 15           UA  TNTC WBCS              Estimated Creatinine Clearance: 17.3 mL/min (A) (by C-G formula based on SCr of 4.42 mg/dL (H)).      Microbiology:  Blood Culture   Date Value Ref Range Status   10/14/2018 No growth at less than 24 hours  Preliminary   10/14/2018 No growth at less than 24 hours  Preliminary                    Urine Culture   Date Value Ref Range Status   10/14/2018 >100,000 CFU/mL Gram Negative Bacilli (A)  Preliminary      Urine culture is growing ESBL Klebsiella        Radiology:  Imaging Results (last 72 hours)     Procedure Component Value Units Date/Time    XR Chest 2 View [[id number]] Collected:  10/14/18 1443     Updated:  10/14/18 1653    Narrative:          EXAMINATION: XR CHEST 2 VW - 10/14/2018     INDICATION: Lethargy.      COMPARISON: None.     FINDINGS: Two-view chest reveals the heart to be enlarged. Underlying  chronic changes seen throughout the  lung fields bilaterally. No evidence  of acute parenchymal disease. Pulmonary vascularity is pronounced. No  focal parenchymal opacification present. Degenerative change is seen  within the spine.           Impression:       Low lung volumes with chronic changes seen within the lung  fields, enlargement heart and no evidence of acute parenchymal disease.     DICTATED:   10/14/2018  EDITED/ls :   10/14/2018      This report was finalized on 10/14/2018 4:51 PM by Dr. Rosalie Kline MD.       CT Head Without Contrast [[id number]] Collected:  10/14/18 1441     Updated:  10/14/18 1652    Narrative:       EXAMINATION: CT HEAD WO CONTRAST - 10/12/2018     INDICATION: Lethargy, weakness.     TECHNIQUE: Multiple axial CT imaging is obtained of the head from skull  base to skull vertex without the administration of intravenous contrast.     The radiation dose reduction device was turned on for each scan per the  ALARA (As Low as Reasonably Achievable) protocol.     COMPARISON: None.     FINDINGS: The parenchyma is grossly unremarkable. Some low-density area  is seen in the periventricular and subcortical white matter suggesting  chronic small vessel ischemic change. There is no hemorrhage or  hydrocephalus. No mass, mass effect or midline shift. Bony structures  reveal no evidence of osseous abnormality. There is mucosal thickening  of the left maxillary sinus and ethmoid air cells. Globes and orbits are  intact. The mastoid air cells are patent.       Impression:       No acute intracranial abnormality with chronic changes seen  within the brain.     DICTATED:   10/14/2018  EDITED/ls :   10/14/2018      This report was finalized on 10/14/2018 4:50 PM by Dr. Rosalie Kline MD.               Impression:   1.  ESBL Klebsiella UTI-I will continue her on Invanz therapy.    2.  Anemia-severe.   3.  Acute/chronic renal failure/ATN   4.  History of Cdiff-she will remain at risk for relapse of C. difficile colitis due to the  need to treat her UTI.  5.  Diabetes Mellitus, type 2-this places her at increased risk for recurrent infections  6.  Encephalopathy-toxic metabolic-this may be secondary to her UTI    PLAN/RECOMMENDATIONS:   1.  Continue intravenous Invanz   2.  Dialysis access-per Dr. Stephane Olivo has obtained the history, performed the physical exam and formulated the above treatment plan.     Velasquez Olivo MD  10/17/2018  5:53 PM

## 2018-10-17 NOTE — PLAN OF CARE
Problem: Patient Care Overview  Goal: Plan of Care Review  Outcome: Ongoing (interventions implemented as appropriate)   10/17/18 1816   Coping/Psychosocial   Plan of Care Reviewed With patient   Plan of Care Review   Progress improving   OTHER   Outcome Summary Pt remained drowsy throughout the day. NPO for tunnel cath placement. VSS.        Problem: Fall Risk (Adult)  Goal: Identify Related Risk Factors and Signs and Symptoms  Outcome: Ongoing (interventions implemented as appropriate)   10/17/18 1816   Fall Risk (Adult)   Related Risk Factors (Fall Risk) fatigue/slow reaction;gait/mobility problems   Signs and Symptoms (Fall Risk) presence of risk factors       Problem: Skin Injury Risk (Adult)  Goal: Identify Related Risk Factors and Signs and Symptoms  Outcome: Ongoing (interventions implemented as appropriate)   10/17/18 1816   Skin Injury Risk (Adult)   Related Risk Factors (Skin Injury Risk) body weight extremes;edema;moisture;tissue perfusion altered

## 2018-10-17 NOTE — OP NOTE
General Surgery Operative Note    Joy Bueno  [id number]  1950    Date of Surgery:  10/17/2018 7:41 PM    Pre-op Diagnosis: Chronic Renal failure    Post-op Diagnosis: Chronic Renal failure      Procedure:  Tunneled dialysis catheter, 23 cm Palindrome, right internal jugular vein                       Guidance with fluoroscopy and ultrasound  Procedure(s):  HEMODIALYSIS CATHETER INSERTION    Surgeon(s):  Carlos Enrique Calderón MD    Anesthesia: Monitor Anesthesia Care    Surgeon: Carlos Enrique Calderón MD    Fluids: 50 mL    Estimated Blood Loss: Less than 25 mL    Findings and interpretation fluoroscopy:   #1  The guidewire is in the right internal jugular vein   #2  The guidewire is in the superior vena cava   #3  Serial dilation over the wire   #4  The catheter tip is at the level of the cavo atrial junction    Complications: None apparent    History:   68-year-old lady with chronic renal failure requiring the institution of dialysis requires vascular access.       I had a discussion regarding the risks and benefits of hemodialysis catheter placement, including but not limited to: bleeding, infection, injury to adjacent viscera (the carotid artery, lung, myocardium), nonfunction, and medical issues from a cardiopulmonary standpoint.  After informed consent the patient was taken to the operating room.    Procedure:     The patient was placed in the supine position on the operating table and conscious sedation was administered by anesthesia.  A timeout was observed.      The patient's neck was interrogated with ultrasound and the right internal jugular vein was easily compressible without intraluminal defect.  The patient's neck was then prepped and draped in the standard sterile fashion.      I accessed the internal jugular vein under direct ultrasound guidance after anesthetizing the skin.  I placed my 0.035 guidewire through the introducer needle into the internal jugular vein and under fluoroscopic  guidance maneuvered this into the superior vena cava.  I then serially dilated over the wire leaving a peel-away sheath in the superior vena cava.  The catheter was then tunneled appropriately.  The tip of the catheter was positioned at the level of the cavoatrial junction.  The catheter flushed well and was instilled with heparinized saline.  The catheter was then sewn into place, the nick in the neck was closed with 4-0 Monocryl,  the catheter exit site was then covered with a chlorhexidine impregnated Tegaderm, and a sterile dressing was placed on the nick in the neck.       The patient tolerated the procedure well and was transferred back to the PACU in stable condition.  A stat portable chest x-ray will be obtained.    Carlos Enrique Calderón MD     Date: 10/17/2018  Time: 7:41 PM

## 2018-10-17 NOTE — PROGRESS NOTES
I have reviewed the chart, my prior clinic notes, appropriate imaging, and labs.  I have again discussed the risks and benefits of tunneled dialysis catheter placement with fluoroscopy and ultrasound with the patient.  All of her questions have been answered.  They understand and wish to proceed with intervention.

## 2018-10-17 NOTE — DISCHARGE PLACEMENT REQUEST
"Pam Loyd  (68 y.o. Female)     Date of Birth Social Security Number Address Home Phone MRN    1950  [address] [facility]  [address]  [place] [address] [phone] [id number]    Nondenominational Marital Status          None        Admission Date Admission Type Admitting Provider Attending Provider Department, Room/Bed    10/14/18 Emergency Paula Silver MD Opi[place], [place], MD [address], [address]/1    Discharge Date Discharge Disposition Discharge Destination                       Attending Provider:  Paula Silver MD    Allergies:  Geodon [Ziprasidone Hcl], Haldol [Haloperidol Lactate], Seroquel [Quetiapine Fumarate]    Isolation:  Contact   Infection:  ESBL Klebsiella (08/05/18)   Code Status:  CPR    Ht:  165.1 cm (65\")   Wt:  136 kg (300 lb)    Admission Cmt:  None   Principal Problem:  None                Active Insurance as of 10/14/2018     Primary Coverage     Payor Plan Insurance Group Employer/Plan Group    MEDICARE MEDICARE A & B      Payor Plan Address Payor Plan Phone Number Effective From Effective To    [address] [phone] 5/1/2016     [facility] [id number]       Subscriber Name Subscriber Birth Date Member ID       PAM LOYD 1950 [id number]           Secondary Coverage     Payor Plan Insurance Group Employer/Plan Group    [place]      Payor Plan Address Payor Plan Phone Number Effective From Effective To    [address] [phone] 8/2/2018     [facility] [id number]       Subscriber Name Subscriber Birth Date Member ID       PAM LOYD 1950 [id number]                 Emergency Contacts      (Rel.) Home Phone Work Phone Mobile Phone    Tessie Dorado (Daughter) [phone] --               Physician Progress Notes (last 72 hours) (Notes from 10/14/2018 12:46 PM through 10/17/2018 12:46 PM)      Lizz Clark APRN at 10/17/2018 12:00 PM          INFECTIOUS DISEASE PROGRESS " NOTE    Joy Bueno  1950  [id number]      Admission Date: 10/14/2018      Requesting Provider: No ref. provider found  Evaluating Physician: SUELLEN Chambers    Reason for Consultation:  ESBL Ecoli  UTI      History of present illness:    10/15/18: Patient is a 68 y.o. female seen today for a UTI.  She was brought from her nursing home with increasing lethargy, and  confusion over the past several days.  She was hospitalized here in August and treated with Invanz for an ESBL Ecoli UTI.  On admission, her urinalysis had too  Numerous to count white cells, and urine culture now with Gram negative rods . She is hypothermic,  With a leukocytosis of 14, 000,  hgb of 7.0, Scr 4.29. Ertapenem was initiated and we were consulted for evaluation and treatment.    10/16/18: She denies nausea and vomiting.  She has decreased dyspnea.  She is scheduled for hemodialysis access by Dr. Calderón.  I discussed her situation with Dr. Calderón today.  10/17/18:  She is now on nasal cannula.  She will  Have her dialysis access later today.  Remains afebrile.     Past Medical History:   Diagnosis Date   • Anemia    • Anxiety    • Asthma    • Chronic kidney disease    • Diabetes mellitus (CMS/[facility])    • Diabetes mellitus, type II (CMS/[facility]) 8/3/2018   • History of Clostridium difficile infection 8/3/2018   • History of respiratory failure, since off home oxygen 8/3/2018   • History of UTI 8/3/2018   • Hypertension    • Morbid obesity (CMS/[facility])    • Osteoarthritis    • Tinea capitis 8/3/2018       Past Surgical History:   Procedure Laterality Date   •  SECTION     • COLONOSCOPY N/A 2017    Procedure: COLONOSCOPY;  Surgeon: Mark I Brunner, MD;  Location:  [facility];  Service:    • ENDOSCOPY N/A 2017    Procedure: ESOPHAGOGASTRODUODENOSCOPY ;  Surgeon: Velasquez Call MD;  Location:  [facility];  Service:    • HERNIA REPAIR         Family History   Problem Relation Age of Onset   • Cardiomyopathy  Mother    • Stroke Father    • Diabetes Father        Social History     Social History   • Marital status:      Spouse name: N/A   • Number of children: N/A   • Years of education: N/A     Occupational History   • Not on file.     Social History Main Topics   • Smoking status: Former Smoker     Packs/day: 0.25   • Smokeless tobacco: Not on file      Comment: unknown when quit, maybe last year   • Alcohol use No   • Drug use: No   • Sexual activity: No     Other Topics Concern   • Not on file     Social History Narrative    Mrs. Bueno is a 68 year old AA  female. She lives alone in [place] but has been in rehab for some time. She has a daughter. She does not have an advanced directive.       Allergies   Allergen Reactions   • Geodon [Ziprasidone Hcl] Unknown (See Comments)     PT DOESN'T REMEMBER   • Haldol [Haloperidol Lactate] Unknown (See Comments)     PT DOESN'T REMEMBER   • Seroquel [Quetiapine Fumarate] Unknown (See Comments)     PT DOESN'T REMEMBER         Medication:    Current Facility-Administered Medications:   •  acetaminophen (TYLENOL) tablet 650 mg, 650 mg, Oral, Q4H PRN, Doris Heard, APRN, 650 mg at 10/16/18 1332  •  amLODIPine (NORVASC) tablet 10 mg, 10 mg, Oral, Daily, Doris Heard, APRN, 10 mg at 10/17/18 0910  •  atorvastatin (LIPITOR) tablet 10 mg, 10 mg, Oral, Nightly, Doris Heard, APRN, 10 mg at 10/16/18 2051  •  budesonide-formoterol (SYMBICORT) 160-4.5 MCG/ACT inhaler 2 puff, 2 puff, Inhalation, BID - RT, Doris Heard, APRN, 2 puff at 10/17/18 0936  •  carvedilol (COREG) tablet 25 mg, 25 mg, Oral, BID With Meals, Doris Heard APRN, Stopped at 10/17/18 0911  •  dextrose (D50W) 25 g/ 50mL Intravenous Solution 25 g, 25 g, Intravenous, Q15 Min PRN, Doris Heard, APRN  •  dextrose (GLUTOSE) oral gel 15 g, 15 g, Oral, Q15 Min PRN, Doris Heard, APRN  •  epoetin porter (EPOGEN,PROCRIT) injection 20,000 Units, 20,000 Units, Subcutaneous, Once per day  on Mon Wed Fri, Rocky Nova MD, Stopped at 10/17/18 0911  •  ertapenem (INVanz) 500 mg in sodium chloride 0.9 % 50 mL IVPB, 500 mg, Intravenous, Q24H, Velasquez Olivo MD, Last Rate: 100 mL/hr at 10/16/18 1730, 500 mg at 10/16/18 1730  •  glucagon (human recombinant) (GLUCAGEN DIAGNOSTIC) injection 1 mg, 1 mg, Subcutaneous, PRN, Doris Heard APRN  •  heparin (porcine) 5000 UNIT/ML injection 5,000 Units, 5,000 Units, Subcutaneous, Q12H, Doris Heard APRGISEL, 5,000 Units at 10/17/18 0911  •  hydrALAZINE (APRESOLINE) tablet 100 mg, 100 mg, Oral, TID, Doris Heard APRN, 100 mg at 10/17/18 0910  •  Influenza Vac Subunit Quad (FLUCELVAX) injection 0.5 mL, 0.5 mL, Intramuscular, During Hospitalization, Paula Silver MD  •  insulin lispro (humaLOG) injection 0-7 Units, 0-7 Units, Subcutaneous, 4x Daily With Meals & Nightly, Doris Heard APRN, 2 Units at 10/16/18 1731  •  isosorbide mononitrate (IMDUR) 24 hr tablet 30 mg, 30 mg, Oral, Daily, Doris Heard APRN, 30 mg at 10/17/18 0910  •  ondansetron (ZOFRAN) tablet 4 mg, 4 mg, Oral, Q6H PRN **OR** ondansetron (ZOFRAN) injection 4 mg, 4 mg, Intravenous, Q6H PRN, Doris Heard APRN  •  pantoprazole (PROTONIX) EC tablet 40 mg, 40 mg, Oral, Q AM, Doris Heard APRN, 40 mg at 10/17/18 0557  •  Patiromer Sorbitex Calcium pack 1 packet, 16.8 g, Oral, Once, Chaparrita Olmedo, [facility], Stopped at 10/15/18 1632  •  polyethylene glycol 3350 powder (packet), 17 g, Oral, Daily, Doris Heard APRN, Stopped at 10/17/18 0912  •  saccharomyces boulardii (FLORASTOR) capsule 250 mg, 250 mg, Oral, BID, Doris Heard APRN, 250 mg at 10/17/18 0910  •  sertraline (ZOLOFT) tablet 50 mg, 50 mg, Oral, Daily, Doris Heard APRN, 50 mg at 10/17/18 0910  •  sodium bicarbonate tablet 1,300 mg, 1,300 mg, Oral, BID, Rocky Nova MD, 1,300 mg at 10/17/18 0910  •  sodium chloride 0.9 % flush 3 mL, 3 mL, Intravenous, Q12H, Doris Heard,  APRN, 3 mL at 10/16/18 2057  •  sodium chloride 0.9 % flush 3-10 mL, 3-10 mL, Intravenous, PRN, Doris Heard, SUELLEN    Antibiotics:  Anti-Infectives     Ordered     Dose/Rate Route Frequency Start Stop    10/15/18 0920  ertapenem (INVanz) 500 mg in sodium chloride 0.9 % 50 mL IVPB     Ordering Provider:  Velasquez Olivo MD    500 mg  100 mL/hr over 30 Minutes Intravenous Every 24 Hours 10/15/18 1800 10/20/18 1759    10/14/18 1512  ertapenem (INVanz) 1 g/100 mL 0.9% NS VTB (mbp)     Ordering Provider:  Raheel Brunson PA    1 g  over 30 Minutes Intravenous Once 10/14/18 1514 10/14/18 1701            Review of Systems:  No reliable ROS from patient     Physical Exam:   Vital Signs  Temp (24hrs), Av.6 °F (36.4 °C), Min:97 °F (36.1 °C), Max:98.1 °F (36.7 °C)    Temp  Min: 97 °F (36.1 °C)  Max: 98.1 °F (36.7 °C)  BP  Min: 103/44  Max: 159/67  Pulse  Min: 60  Max: 75  Resp  Min: 16  Max: 20  SpO2  Min: 94 %  Max: 100 %    GENERAL drowsy .  She is in no acute distress..   HEENT: Normocephalic, atraumatic.  PERRL. EOMI. No conjunctival injection. No icterus. Oropharynx clear without evidence of thrush or exudate.   NECK: Supple without nuchal rigidity. No mass.  LYMPH: No cervical, axillary or inguinal lymphadenopathy.  HEART: RRR; No murmur, rubs, gallops.   LUNGS:  Few rhonchi  Anteriorly   ABDOMEN: Soft, nontender,firm,  bowel sounds quiet  No rebound or guarding. NO mass or HSM.  EXT:  No cyanosis, clubbing or edema. No cord.  : Genitalia generally unremarkable. purewick in place   MSK: FROM without joint effusions noted arms/legs.    SKIN: Warm and dry  Scalp eczema,   Neuro: drowsy but is confused.    Laboratory Data      Results from last 7 days  Lab Units 10/17/18  0543 10/16/18  1936 10/16/18  0802 10/15/18  0633 10/14/18  1329   WBC 10*3/mm3  --   --  12.46* 11.79* 14.20*   HEMOGLOBIN g/dL 9.0* 6.8* 6.9* 5.1* 7.0*   HEMATOCRIT % 29.0* 22.9* 22.8* 23.8* 23.1*   PLATELETS 10*3/mm3  --   --  210 194  238       Results from last 7 days  Lab Units 10/16/18  0802   SODIUM mmol/L 143   POTASSIUM mmol/L 5.6*   CHLORIDE mmol/L 115*   CO2 mmol/L 20.0   BUN mg/dL 78*   CREATININE mg/dL 4.42*   GLUCOSE mg/dL 147*   CALCIUM mg/dL 8.2*       Results from last 7 days  Lab Units 10/14/18  1329   ALK PHOS U/L 86   BILIRUBIN mg/dL 0.2*   ALT (SGPT) U/L 14   AST (SGOT) U/L 15           UA  TNTC WBCS              Estimated Creatinine Clearance: 17.3 mL/min (A) (by C-G formula based on SCr of 4.42 mg/dL (H)).      Microbiology:  Blood Culture   Date Value Ref Range Status   10/14/2018 No growth at less than 24 hours  Preliminary   10/14/2018 No growth at less than 24 hours  Preliminary                    Urine Culture   Date Value Ref Range Status   10/14/2018 >100,000 CFU/mL Gram Negative Bacilli (A)  Preliminary      Urine culture is growing ESBL Klebsiella        Radiology:  Imaging Results (last 72 hours)     Procedure Component Value Units Date/Time    XR Chest 2 View [[id number]] Collected:  10/14/18 1443     Updated:  10/14/18 1653    Narrative:          EXAMINATION: XR CHEST 2 VW - 10/14/2018     INDICATION: Lethargy.      COMPARISON: None.     FINDINGS: Two-view chest reveals the heart to be enlarged. Underlying  chronic changes seen throughout the lung fields bilaterally. No evidence  of acute parenchymal disease. Pulmonary vascularity is pronounced. No  focal parenchymal opacification present. Degenerative change is seen  within the spine.           Impression:       Low lung volumes with chronic changes seen within the lung  fields, enlargement heart and no evidence of acute parenchymal disease.     DICTATED:   10/14/2018  EDITED/ls :   10/14/2018      This report was finalized on 10/14/2018 4:51 PM by Dr. Rosalie Kline MD.       CT Head Without Contrast [[id number]] Collected:  10/14/18 1441     Updated:  10/14/18 1652    Narrative:       EXAMINATION: CT HEAD WO CONTRAST - 10/12/2018     INDICATION: Lethargy,  weakness.     TECHNIQUE: Multiple axial CT imaging is obtained of the head from skull  base to skull vertex without the administration of intravenous contrast.     The radiation dose reduction device was turned on for each scan per the  ALARA (As Low as Reasonably Achievable) protocol.     COMPARISON: None.     FINDINGS: The parenchyma is grossly unremarkable. Some low-density area  is seen in the periventricular and subcortical white matter suggesting  chronic small vessel ischemic change. There is no hemorrhage or  hydrocephalus. No mass, mass effect or midline shift. Bony structures  reveal no evidence of osseous abnormality. There is mucosal thickening  of the left maxillary sinus and ethmoid air cells. Globes and orbits are  intact. The mastoid air cells are patent.       Impression:       No acute intracranial abnormality with chronic changes seen  within the brain.     DICTATED:   10/14/2018  EDITED/ls :   10/14/2018      This report was finalized on 10/14/2018 4:50 PM by Dr. Rosalie Kline MD.               Impression:   1.  ESBL Klebsiella UTI-I will continue her on Invanz therapy.    2.  Anemia-severe.   3.  Acute/chronic renal failure/ATN   4.  History of Cdiff-she will remain at risk for relapse of C. difficile colitis due to the need to treat her UTI.  5.  Diabetes Mellitus, type 2-this places her at increased risk for recurrent infections  6.  Encephalopathy-toxic metabolic-this may be secondary to her UTI    PLAN/RECOMMENDATIONS:   1.  Continue intravenous Invanz   2.  Dialysis access-per Dr. Stephane Olivo has obtained the history, performed the physical exam and formulated the above treatment plan.     SUELLEN Chambers  10/17/2018  12:00 PM                  Electronically signed by Lizz Clark APRN at 10/17/2018 12:03 PM     Paula Silver MD at 10/17/2018  9:40 AM              [facility]  PROGRESS NOTE    Patient Name: Joy  Myles  : 1950  MRN: [id number]    Date of Admission: 10/14/2018  Length of Stay: 3  Primary Care Physician: Provider, No Known    Subjective   Subjective     CC: f/u for sepsis and NATALIO    HPI: No acute events overnight.  Patient said that she slept okay.  Does endorse some right arm pain.  Shortness of air is improved.      Review of Systems  Gen- No fevers, chills  CV- No chest pain, palpitations  Resp- No cough, dyspnea  GI- No N/V/D, abd pain    Otherwise ROS is negative except as mentioned in the HPI.    Objective   Objective     Vital Signs:   Temp:  [97 °F (36.1 °C)-98.6 °F (37 °C)] 97.1 °F (36.2 °C)  Heart Rate:  [60-75] 72  Resp:  [16-20] 16  BP: (103-159)/(44-67) 149/67     Physical Exam:  Constitutional: Chronically ill lady, in no acute distress, awake, alert  HENT: NCAT, mucous membranes dry.  Respiratory: Nonlabored respirations, no rhonchi no wheezes.   Cardiovascular: RRR, no murmurs, rubs, or gallops, palpable pedal pulses bilaterally  Gastrointestinal: Obese, positive bowel sounds, soft, nontender, nondistended  Musculoskeletal: No bilateral ankle edema  Psychiatric: Appropriate affect, cooperative  Neurologic: Oriented x 3, no focal deficits  Skin: No rashes    Results Reviewed:  I have personally reviewed current lab, radiology, and data and agree.      Results from last 7 days  Lab Units 10/17/18  0543 10/16/18  1936 10/16/18  0802 10/15/18  0633 10/14/18  1329   WBC 10*3/mm3  --   --  12.46* 11.79* 14.20*   HEMOGLOBIN g/dL 9.0* 6.8* 6.9* 5.1* 7.0*   HEMATOCRIT % 29.0* 22.9* 22.8* 23.8* 23.1*   PLATELETS 10*3/mm3  --   --  210 194 238       Results from last 7 days  Lab Units 10/16/18  0802 10/15/18  0625 10/14/18  1329   SODIUM mmol/L 143 144 144   POTASSIUM mmol/L 5.6* 5.8* 5.9*   CHLORIDE mmol/L 115* 115* 119*   CO2 mmol/L 20.0 20.0 18.0*   BUN mg/dL 78* 77* 73*   CREATININE mg/dL 4.42* 4.33* 4.29*   GLUCOSE mg/dL 147* 82 96   CALCIUM mg/dL 8.2* 8.2* 8.7   ALT (SGPT) U/L  --   --   14   AST (SGOT) U/L  --   --  15     Estimated Creatinine Clearance: 17.3 mL/min (A) (by C-G formula based on SCr of 4.42 mg/dL (H)).  BNP   Date Value Ref Range Status   10/14/2018 682.0 (H) 0.0 - 100.0 pg/mL Final     Comment:     Results may be falsely decreased if patient taking Biotin.       Microbiology Results Abnormal     Procedure Component Value - Date/Time    Blood Culture - Blood, [[id number]]  (Normal) Collected:  10/14/18 1606    Lab Status:  Preliminary result Specimen:  Blood from Arm, Right Updated:  10/16/18 1631     Blood Culture No growth at 2 days    Blood Culture - Blood, [[id number]]  (Normal) Collected:  10/14/18 1606    Lab Status:  Preliminary result Specimen:  Blood from Arm, Left Updated:  10/16/18 1631     Blood Culture No growth at 2 days    Urine Culture - Urine, [[id number]]  (Abnormal)  (Susceptibility) Collected:  10/14/18 1340    Lab Status:  Final result Specimen:  Urine from Urine, Catheter Updated:  10/16/18 1403     Urine Culture >100,000 CFU/mL Klebsiella pneumoniae ESBL (C)     Comment:   Consider infectious disease consult.  Susceptibility results may not correlate to clinical outcomes.       Susceptibility      Klebsiella pneumoniae ESBL     AL     Ciprofloxacin >2 ug/ml Resistant     Ertapenem <=1 ug/ml Susceptible     Gentamicin <=4 ug/ml Susceptible     Levofloxacin >4 ug/ml Resistant     Meropenem <=1 ug/ml Susceptible     Nitrofurantoin 64 ug/ml Intermediate     Piperacillin + Tazobactam <=16 ug/ml Susceptible     Tetracycline >8 ug/ml Resistant     Tobramycin >8 ug/ml Resistant     Trimethoprim + Sulfamethoxazole >2/38 ug/ml Resistant                        Results for orders placed during the hospital encounter of 09/02/17   Adult Transthoracic Echo Complete    Narrative · Left ventricular wall thickness is consistent with mild concentric   hypertrophy.  · Left atrial cavity size is mildly dilated.  · Mild mitral valve regurgitation is present  · calcification of the  aortic valve  · Mild tricuspid valve regurgitation is present.          I have reviewed the medications.      amLODIPine 10 mg Oral Daily   atorvastatin 10 mg Oral Nightly   budesonide-formoterol 2 puff Inhalation BID - RT   carvedilol 25 mg Oral BID With Meals   epoetin porter 20,000 Units Subcutaneous Once per day on Mon Wed Fri   ertapenem 500 mg Intravenous Q24H   heparin (porcine) 5,000 Units Subcutaneous Q12H   hydrALAZINE 100 mg Oral TID   insulin lispro 0-7 Units Subcutaneous 4x Daily With Meals & Nightly   isosorbide mononitrate 30 mg Oral Daily   pantoprazole 40 mg Oral Q AM   Patiromer Sorbitex Calcium 16.8 g Oral Once   polyethylene glycol 17 g Oral Daily   saccharomyces boulardii 250 mg Oral BID   sertraline 50 mg Oral Daily   sodium bicarbonate 1,300 mg Oral BID   sodium chloride 3 mL Intravenous Q12H     Assessment/Plan   Assessment / Plan     Active Hospital Problems    Diagnosis   • Hyperkalemia   • Metabolic encephalopathy   • Severe sepsis (CMS/[facility])   • Sepsis (CMS/[facility])   • Anemia of chronic renal failure, stage 5 (CMS/[facility])   • Diabetes mellitus, type II (CMS/[facility])   • CKD (chronic kidney disease) stage 4, GFR 15-29 ml/min (CMS/[facility])   • Chronic heart failure (CMS/[facility])   • UTI (urinary tract infection)   • Leukocytosis   • Hypertension     Brief Hospital Course to date:  Joy Bueno is a 68 y.o. female past medical history of type 2 diabetes, significant stage IV, chronic heart failure, hypertension.  Presents to [facility], which [facility] with complaints for 5 days of lethargy, decreased by mouth intake, difficult to arouse.  Here patient has been admitted with sepsis and acute metabolic encephalopathy likely secondary to UTI.     Assessment & Plan:  - Severe sepsis likely secondary to ESBL UTI, continue IVF, f/u blood cultures currently on Invanz, ID following  - Acute encephalopathy resolving, likely multifactorial sec to uremia vs sepsis, improving  - NATALIO on CKD stage 4, with metabolic and  respiratory acidosis baseline Cr unknown, suspect ATN sec to sepsis, renal consulted, plan for tunneled cath placement today and AVF creation for HD,  - Suspected decompensated HF with elevated BNP and hypoxia, continue O2, diuresis will be limited with her renal failure, will await renal consult  - Previously well controlled T2DM AC1 6.8% (8/3) continue ssi  - Anemia worse, no si/sx of acute blood loss, suspect ACD, continue to monitor, s/p 3 unit PRBC transfuse Hg 6.9 this AM, transfuse 1 unit, trend  - Complex case     DVT Prophylaxis:  [facility]    CODE STATUS:   Code Status and Medical Interventions:   Ordered at: 10/14/18 1942     Level Of Support Discussed With:    Patient     Code Status:    CPR     Medical Interventions (Level of Support Prior to Arrest):    Full     Disposition: TBD    Electronically signed by Paula Silver MD, 10/17/18, 10:09 AM.      Electronically signed by Paula Silver MD at 10/17/2018 10:11 AM     Velasquez Olivo MD at 10/16/2018  4:34 PM          INFECTIOUS DISEASE PROGRESS NOTE    Joy Myles  1950  [id number]      Admission Date: 10/14/2018      Requesting Provider: No ref. provider found  Evaluating Physician: Velasquez Olivo MD    Reason for Consultation:  ESBL Ecoli  UTI      History of present illness:    10/15/18: Patient is a 68 y.o. female seen today for a UTI.  She was brought from her nursing home with increasing lethargy, and  confusion over the past several days.  She was hospitalized here in August and treated with Invanz for an ESBL Ecoli UTI.  On admission, her urinalysis had too  Numerous to count white cells, and urine culture now with Gram negative rods . She is hypothermic,  With a leukocytosis of 14, 000,  hgb of 7.0, Scr 4.29. Ertapenem was initiated and we were consulted for evaluation and treatment.    10/16/18: She denies nausea and vomiting.  She has decreased dyspnea.  She is scheduled for hemodialysis access by Dr. Calderón.  I discussed her  situation with Dr. Calderón today.    Past Medical History:   Diagnosis Date   • Anemia    • Anxiety    • Asthma    • Chronic kidney disease    • Diabetes mellitus (CMS/[facility])    • Diabetes mellitus, type II (CMS/[facility]) 8/3/2018   • History of Clostridium difficile infection 8/3/2018   • History of respiratory failure, since off home oxygen 8/3/2018   • History of UTI 8/3/2018   • Hypertension    • Morbid obesity (CMS/[facility])    • Osteoarthritis    • Tinea capitis 8/3/2018       Past Surgical History:   Procedure Laterality Date   •  SECTION     • COLONOSCOPY N/A 2017    Procedure: COLONOSCOPY;  Surgeon: Mark I Brunner, MD;  Location:  [facility];  Service:    • ENDOSCOPY N/A 2017    Procedure: ESOPHAGOGASTRODUODENOSCOPY ;  Surgeon: Velasquez Call MD;  Location:  [facility];  Service:    • HERNIA REPAIR         Family History   Problem Relation Age of Onset   • Cardiomyopathy Mother    • Stroke Father    • Diabetes Father        Social History     Social History   • Marital status:      Spouse name: N/A   • Number of children: N/A   • Years of education: N/A     Occupational History   • Not on file.     Social History Main Topics   • Smoking status: Former Smoker     Packs/day: 0.25   • Smokeless tobacco: Not on file      Comment: unknown when quit, maybe last year   • Alcohol use No   • Drug use: No   • Sexual activity: No     Other Topics Concern   • Not on file     Social History Narrative    Mrs. Bueno is a 68 year old AA  female. She lives alone in [place] but has been in rehab for some time. She has a daughter. She does not have an advanced directive.       Allergies   Allergen Reactions   • Geodon [Ziprasidone Hcl] Unknown (See Comments)     PT DOESN'T REMEMBER   • Haldol [Haloperidol Lactate] Unknown (See Comments)     PT DOESN'T REMEMBER   • Seroquel [Quetiapine Fumarate] Unknown (See Comments)     PT DOESN'T REMEMBER         Medication:    Current  Facility-Administered Medications:   •  acetaminophen (TYLENOL) tablet 650 mg, 650 mg, Oral, Q4H PRN, Doris Heard APRN, 650 mg at 10/16/18 1332  •  amLODIPine (NORVASC) tablet 10 mg, 10 mg, Oral, Daily, Doris Heard APRN, 10 mg at 10/16/18 0808  •  atorvastatin (LIPITOR) tablet 10 mg, 10 mg, Oral, Nightly, Doris Heard APRN, 10 mg at 10/15/18 1956  •  budesonide-formoterol (SYMBICORT) 160-4.5 MCG/ACT inhaler 2 puff, 2 puff, Inhalation, BID - RT, Doris Heard APRN, 2 puff at 10/16/18 0707  •  carvedilol (COREG) tablet 25 mg, 25 mg, Oral, BID With Meals, Doris Heard APRN, 25 mg at 10/16/18 0808  •  dextrose (D50W) 25 g/ 50mL Intravenous Solution 25 g, 25 g, Intravenous, Q15 Min PRN, Doris Heard APRN  •  dextrose (GLUTOSE) oral gel 15 g, 15 g, Oral, Q15 Min PRN, Doris Heard APRGISEL  •  epoetin porter (EPOGEN,PROCRIT) injection 20,000 Units, 20,000 Units, Subcutaneous, Once per day on Mon Wed Fri, Rocky Nova MD, 20,000 Units at 10/15/18 1649  •  ertapenem (INVanz) 500 mg in sodium chloride 0.9 % 50 mL IVPB, 500 mg, Intravenous, Q24H, Velasquez Olivo MD, Last Rate: 100 mL/hr at 10/15/18 1808, 500 mg at 10/15/18 1808  •  glucagon (human recombinant) (GLUCAGEN DIAGNOSTIC) injection 1 mg, 1 mg, Subcutaneous, PRN, Doris Heard APRN  •  heparin (porcine) 5000 UNIT/ML injection 5,000 Units, 5,000 Units, Subcutaneous, Q12H, Doris Heard APRN, 5,000 Units at 10/16/18 0809  •  hydrALAZINE (APRESOLINE) tablet 100 mg, 100 mg, Oral, TID, Doris Heard APRN, 100 mg at 10/16/18 0808  •  Influenza Vac Subunit Quad (FLUCELVAX) injection 0.5 mL, 0.5 mL, Intramuscular, During Hospitalization, Paula Silver MD  •  insulin lispro (humaLOG) injection 0-7 Units, 0-7 Units, Subcutaneous, 4x Daily With Meals & Nightly, Doris Heard APRN, 2 Units at 10/16/18 0813  •  isosorbide mononitrate (IMDUR) 24 hr tablet 30 mg, 30 mg, Oral, Daily, Doris Heard APRN, 30 mg at  10/16/18 0809  •  ondansetron (ZOFRAN) tablet 4 mg, 4 mg, Oral, Q6H PRN **OR** ondansetron (ZOFRAN) injection 4 mg, 4 mg, Intravenous, Q6H PRN, Doris Heard APRN  •  pantoprazole (PROTONIX) EC tablet 40 mg, 40 mg, Oral, Q AM, Doris Heard APRN, 40 mg at 10/16/18 0808  •  Patiromer Sorbitex Calcium pack 1 packet, 16.8 g, Oral, Once, Chaparrita Olmedo [facility], Stopped at 10/15/18 1632  •  polyethylene glycol 3350 powder (packet), 17 g, Oral, Daily, Doris Heard APRN  •  saccharomyces boulardii (FLORASTOR) capsule 250 mg, 250 mg, Oral, BID, Doris Heard APRN, 250 mg at 10/16/18 0809  •  sertraline (ZOLOFT) tablet 50 mg, 50 mg, Oral, Daily, Doris Heard APRN, 50 mg at 10/16/18 0808  •  sodium bicarbonate tablet 1,300 mg, 1,300 mg, Oral, BID, Rocky Nova MD, 1,300 mg at 10/16/18 0808  •  sodium chloride 0.9 % flush 3 mL, 3 mL, Intravenous, Q12H, Doris Heard APRN, 3 mL at 10/15/18 1955  •  sodium chloride 0.9 % flush 3-10 mL, 3-10 mL, Intravenous, PRN, Doris Heard APRN    Antibiotics:  Anti-Infectives     Ordered     Dose/Rate Route Frequency Start Stop    10/15/18 0920  ertapenem (INVanz) 500 mg in sodium chloride 0.9 % 50 mL IVPB     Ordering Provider:  Velasquez Olivo MD    500 mg  100 mL/hr over 30 Minutes Intravenous Every 24 Hours 10/15/18 1800 10/20/18 1759    10/14/18 1512  ertapenem (INVanz) 1 g/100 mL 0.9% NS VTB (mbp)     Ordering Provider:  Raheel Brunson PA    1 g  over 30 Minutes Intravenous Once 10/14/18 1514 10/14/18 1701            Review of Systems:  No reliable ROS from patient     Physical Exam:   Vital Signs  Temp (24hrs), Av.5 °F (36.4 °C), Min:97 °F (36.1 °C), Max:98.6 °F (37 °C)    Temp  Min: 97 °F (36.1 °C)  Max: 98.6 °F (37 °C)  BP  Min: 105/44  Max: 156/64  Pulse  Min: 67  Max: 78  Resp  Min: 18  Max: 20  SpO2  Min: 94 %  Max: 100 %    GENERAL: Or alert and responsive.  She is in no acute distress..   HEENT: Normocephalic, atraumatic.   PERRL. EOMI. No conjunctival injection. No icterus. Oropharynx clear without evidence of thrush or exudate.   NECK: Supple without nuchal rigidity. No mass.  LYMPH: No cervical, axillary or inguinal lymphadenopathy.  HEART: RRR; No murmur, rubs, gallops.   LUNGS:  Few rhonchi   ABDOMEN: Soft, nontender,firm,  bowel sounds quiet  No rebound or guarding. NO mass or HSM.  EXT:  No cyanosis, clubbing or edema. No cord.  : Genitalia generally unremarkable. purewick in place   MSK: FROM without joint effusions noted arms/legs.    SKIN: Warm and dry  Scalp eczema,   Neuro: She is more alert and responsive but is confused.    Laboratory Data      Results from last 7 days  Lab Units 10/16/18  0802 10/15/18  0633 10/14/18  1329   WBC 10*3/mm3 12.46* 11.79* 14.20*   HEMOGLOBIN g/dL 6.9* 5.1* 7.0*   HEMATOCRIT % 22.8* 23.8* 23.1*   PLATELETS 10*3/mm3 210 194 238       Results from last 7 days  Lab Units 10/16/18  0802   SODIUM mmol/L 143   POTASSIUM mmol/L 5.6*   CHLORIDE mmol/L 115*   CO2 mmol/L 20.0   BUN mg/dL 78*   CREATININE mg/dL 4.42*   GLUCOSE mg/dL 147*   CALCIUM mg/dL 8.2*       Results from last 7 days  Lab Units 10/14/18  1329   ALK PHOS U/L 86   BILIRUBIN mg/dL 0.2*   ALT (SGPT) U/L 14   AST (SGOT) U/L 15           UA  TNTC WBCS              Estimated Creatinine Clearance: 17.3 mL/min (A) (by C-G formula based on SCr of 4.42 mg/dL (H)).      Microbiology:  Blood Culture   Date Value Ref Range Status   10/14/2018 No growth at less than 24 hours  Preliminary   10/14/2018 No growth at less than 24 hours  Preliminary                    Urine Culture   Date Value Ref Range Status   10/14/2018 >100,000 CFU/mL Gram Negative Bacilli (A)  Preliminary      Urine culture is growing ESBL Klebsiella        Radiology:  Imaging Results (last 72 hours)     Procedure Component Value Units Date/Time    XR Chest 2 View [[id number]] Collected:  10/14/18 1443     Updated:  10/14/18 1653    Narrative:          EXAMINATION: XR CHEST 2  VW - 10/14/2018     INDICATION: Lethargy.      COMPARISON: None.     FINDINGS: Two-view chest reveals the heart to be enlarged. Underlying  chronic changes seen throughout the lung fields bilaterally. No evidence  of acute parenchymal disease. Pulmonary vascularity is pronounced. No  focal parenchymal opacification present. Degenerative change is seen  within the spine.           Impression:       Low lung volumes with chronic changes seen within the lung  fields, enlargement heart and no evidence of acute parenchymal disease.     DICTATED:   10/14/2018  EDITED/ls :   10/14/2018      This report was finalized on 10/14/2018 4:51 PM by Dr. Rosalie Kline MD.       CT Head Without Contrast [[id number]] Collected:  10/14/18 1441     Updated:  10/14/18 1652    Narrative:       EXAMINATION: CT HEAD WO CONTRAST - 10/12/2018     INDICATION: Lethargy, weakness.     TECHNIQUE: Multiple axial CT imaging is obtained of the head from skull  base to skull vertex without the administration of intravenous contrast.     The radiation dose reduction device was turned on for each scan per the  ALARA (As Low as Reasonably Achievable) protocol.     COMPARISON: None.     FINDINGS: The parenchyma is grossly unremarkable. Some low-density area  is seen in the periventricular and subcortical white matter suggesting  chronic small vessel ischemic change. There is no hemorrhage or  hydrocephalus. No mass, mass effect or midline shift. Bony structures  reveal no evidence of osseous abnormality. There is mucosal thickening  of the left maxillary sinus and ethmoid air cells. Globes and orbits are  intact. The mastoid air cells are patent.       Impression:       No acute intracranial abnormality with chronic changes seen  within the brain.     DICTATED:   10/14/2018  EDITED/ls :   10/14/2018      This report was finalized on 10/14/2018 4:50 PM by Dr. Rosalie Kline MD.               Impression:   1.  ESBL Klebsiella UTI-I will continue  "her on Invanz therapy.    2.  Anemia-severe.   3.  Acute/chronic renal failure/ATN   4.  History of Cdiff-she will remain at risk for relapse of C. difficile colitis due to the need to treat her UTI.  5.  Diabetes Mellitus, type 2-this places her at increased risk for recurrent infections  6.  Encephalopathy-toxic metabolic-this may be secondary to her UTI    PLAN/RECOMMENDATIONS:   1.  Continue intravenous Invanz   2.  Dialysis access-per Dr. Stephane Olivo MD  10/16/2018  4:34 PM                  Electronically signed by Velasquez Olivo MD at 10/16/2018  4:37 PM     Rocky Nova MD at 10/16/2018 11:45 AM             LOS: 2 days    Patient Care Team:  Provider, No Known as PCP - General    Reason For Visit:  F/U STAGE 5 CKD.  Subjective           Review of Systems:    Pulm: No soa   CV:  No CP      Objective       amLODIPine 10 mg Oral Daily   atorvastatin 10 mg Oral Nightly   budesonide-formoterol 2 puff Inhalation BID - RT   carvedilol 25 mg Oral BID With Meals   epoetin porter 20,000 Units Subcutaneous Once per day on Mon Wed Fri   ertapenem 500 mg Intravenous Q24H   heparin (porcine) 5,000 Units Subcutaneous Q12H   hydrALAZINE 100 mg Oral TID   insulin lispro 0-7 Units Subcutaneous 4x Daily With Meals & Nightly   isosorbide mononitrate 30 mg Oral Daily   pantoprazole 40 mg Oral Q AM   Patiromer Sorbitex Calcium 16.8 g Oral Once   polyethylene glycol 17 g Oral Daily   saccharomyces boulardii 250 mg Oral BID   sertraline 50 mg Oral Daily   sodium bicarbonate 1,300 mg Oral BID   sodium chloride 3 mL Intravenous Q12H   sodium polystyrene 30 g Oral Once            Vital Signs:  Blood pressure 117/77, pulse 71, temperature 98 °F (36.7 °C), temperature source Oral, resp. rate 20, height 165.1 cm (65\"), weight 136 kg (300 lb), SpO2 99 %.    Flowsheet Rows      First Filed Value   Admission Height  165.1 cm (65\") Documented at 10/14/2018 1215   Admission Weight  136 kg (300 lb) Documented at " 10/14/2018 1215          10/15 0701 - 10/16 0700  In: 1580 [P.O.:960]  Out: 800 [Urine:800]    Physical Exam:    General Appearance: NAD, alert and cooperative, Ox3  Eyes: PER, conjunctivae and sclerae normal, no icterus  Lungs: respirations regular and unlabored, no crepitus, clear to auscultation  Heart/CV: regular rhythm & normal rate, no murmur, no gallop, no rub and TR edema  Abdomen: not distended, soft, non-tender, no masses,  bowel sounds present  Skin: No rash, Warm and dry    Radiology:            Labs:    Results from last 7 days  Lab Units 10/16/18  0802 10/15/18  0633 10/14/18  1329   WBC 10*3/mm3 12.46* 11.79* 14.20*   HEMOGLOBIN g/dL 6.9* 5.1* 7.0*   HEMATOCRIT % 22.8* 23.8* 23.1*   PLATELETS 10*3/mm3 210 194 238       Results from last 7 days  Lab Units 10/16/18  0802 10/15/18  0625 10/14/18  1329   SODIUM mmol/L 143 144 144   POTASSIUM mmol/L 5.6* 5.8* 5.9*   CHLORIDE mmol/L 115* 115* 119*   CO2 mmol/L 20.0 20.0 18.0*   BUN mg/dL 78* 77* 73*   CREATININE mg/dL 4.42* 4.33* 4.29*   CALCIUM mg/dL 8.2* 8.2* 8.7   PHOSPHORUS mg/dL 6.4*  --   --    MAGNESIUM mg/dL 1.5 1.3  --    ALBUMIN g/dL 3.12*  --  3.37       Results from last 7 days  Lab Units 10/16/18  0802   GLUCOSE mg/dL 147*         Results from last 7 days  Lab Units 10/14/18  1329   ALK PHOS U/L 86   BILIRUBIN mg/dL 0.2*   ALT (SGPT) U/L 14   AST (SGOT) U/L 15       Results from last 7 days  Lab Units 10/14/18  1851   PH, ARTERIAL pH units 7.186*   PO2 ART mm Hg 127.0*   PCO2, ARTERIAL mm Hg 48.1*   HCO3 ART mmol/L 18.2*         Results from last 7 days  Lab Units 10/14/18  1340   COLOR UA  Yellow   CLARITY UA  Turbid*   PH, URINE  6.0   SPECIFIC GRAVITY, URINE  1.020   GLUCOSE UA  Negative   KETONES UA  Negative   BILIRUBIN UA  Negative   PROTEIN UA  100 mg/dL (2+)*   BLOOD UA  Small (1+)*   LEUKOCYTES UA  Large (3+)*   NITRITE UA  Negative       Estimated Creatinine Clearance: 17.3 mL/min (A) (by C-G formula based on SCr of 4.42 mg/dL  (H)).      Assessment       Hypertension    Leukocytosis    UTI (urinary tract infection)    Chronic heart failure (CMS/[facility])    CKD (chronic kidney disease) stage 4, GFR 15-29 ml/min (CMS/[facility])    Diabetes mellitus, type II (CMS/[facility])    Anemia of chronic renal failure, stage 5 (CMS/[facility])    Hyperkalemia    Metabolic encephalopathy    Severe sepsis (CMS/[facility])    Sepsis (CMS/[facility])            Impression: STAGE 5 CKD. ANEMIA. HYPERKALEMIA.             Recommendations: TUNNELED HD CATH SOON AND START DIALYSIS.      Rocky Nova MD  10/16/18  11:46 AM          Electronically signed by Rocky Nova MD at 10/16/2018 11:48 AM     Paula Silver MD at 10/16/2018 10:02 AM              [facility] Medicine Services  PROGRESS NOTE    Patient Name: Joy Bueno  : 1950  MRN: [id number]    Date of Admission: 10/14/2018  Length of Stay: 2  Primary Care Physician: Provider, No Known    Subjective   Subjective     CC: f/u sepsis/UTI and NATALIO    HPI:No acute events overnight, patient is awake and alert, however does endorse pain      Review of Systems  Gen- No fevers, chills  CV- No chest pain, palpitations  Resp- No cough, dyspnea  GI- No N/V/D, abd pain    Otherwise ROS is negative except as mentioned in the HPI.    Objective   Objective     Vital Signs:   Temp:  [96.5 °F (35.8 °C)-98 °F (36.7 °C)] 98 °F (36.7 °C)  Heart Rate:  [71-78] 71  Resp:  [18-20] 20  BP: (117-156)/(53-78) 117/77  FiO2 (%):  [24 %] 24 %        Physical Exam:  Constitutional: Chronically ill lady, in no acute distress, awake, alert  HENT: NCAT, mucous membranes moist, dry  Respiratory: Non-labored respirations, coarse BS, no wheezes,respiratory effort normal   Cardiovascular: RRR, no murmurs, rubs, or gallops, palpable pedal pulses bilaterally  Gastrointestinal: obese, positive bowel sounds, soft, nontender, nondistended  Musculoskeletal: No bilateral ankle edema  Psychiatric: Appropriate affect,  cooperative  Neurologic: Oriented x 3, no focal deficits  Skin: No rashes    Results Reviewed:  I have personally reviewed current lab, radiology, and data and agree.      Results from last 7 days  Lab Units 10/16/18  0802 10/15/18  0633 10/14/18  1329   WBC 10*3/mm3 12.46* 11.79* 14.20*   HEMOGLOBIN g/dL 6.9* 5.1* 7.0*   HEMATOCRIT % 22.8* 23.8* 23.1*   PLATELETS 10*3/mm3 210 194 238       Results from last 7 days  Lab Units 10/16/18  0802 10/15/18  0625 10/14/18  1329   SODIUM mmol/L 143 144 144   POTASSIUM mmol/L 5.6* 5.8* 5.9*   CHLORIDE mmol/L 115* 115* 119*   CO2 mmol/L 20.0 20.0 18.0*   BUN mg/dL 78* 77* 73*   CREATININE mg/dL 4.42* 4.33* 4.29*   GLUCOSE mg/dL 147* 82 96   CALCIUM mg/dL 8.2* 8.2* 8.7   ALT (SGPT) U/L  --   --  14   AST (SGOT) U/L  --   --  15     Estimated Creatinine Clearance: 17.3 mL/min (A) (by C-G formula based on SCr of 4.42 mg/dL (H)).  BNP   Date Value Ref Range Status   10/14/2018 682.0 (H) 0.0 - 100.0 pg/mL Final     Comment:     Results may be falsely decreased if patient taking Biotin.       Microbiology Results Abnormal     Procedure Component Value - Date/Time    Blood Culture - Blood, [[id number]]  (Normal) Collected:  10/14/18 1606    Lab Status:  Preliminary result Specimen:  Blood from Arm, Left Updated:  10/15/18 1631     Blood Culture No growth at 24 hours    Blood Culture - Blood, [[id number]]  (Normal) Collected:  10/14/18 1606    Lab Status:  Preliminary result Specimen:  Blood from Arm, Right Updated:  10/15/18 1631     Blood Culture No growth at 24 hours    Urine Culture - Urine, [[id number]]  (Abnormal) Collected:  10/14/18 1340    Lab Status:  Preliminary result Specimen:  Urine from Urine, Catheter Updated:  10/15/18 0719     Urine Culture >100,000 CFU/mL Gram Negative Bacilli (A)          Imaging Results (last 24 hours)     ** No results found for the last 24 hours. **        Results for orders placed during the hospital encounter of 09/02/17   Adult Transthoracic  Echo Complete    Narrative · Left ventricular wall thickness is consistent with mild concentric   hypertrophy.  · Left atrial cavity size is mildly dilated.  · Mild mitral valve regurgitation is present  · calcification of the aortic valve  · Mild tricuspid valve regurgitation is present.          I have reviewed the medications.      amLODIPine 10 mg Oral Daily   atorvastatin 10 mg Oral Nightly   budesonide-formoterol 2 puff Inhalation BID - RT   carvedilol 25 mg Oral BID With Meals   epoetin porter 20,000 Units Subcutaneous Once per day on Mon Wed Fri   ertapenem 500 mg Intravenous Q24H   heparin (porcine) 5,000 Units Subcutaneous Q12H   hydrALAZINE 100 mg Oral TID   insulin lispro 0-7 Units Subcutaneous 4x Daily With Meals & Nightly   isosorbide mononitrate 30 mg Oral Daily   pantoprazole 40 mg Oral Q AM   Patiromer Sorbitex Calcium 16.8 g Oral Once   polyethylene glycol 17 g Oral Daily   saccharomyces boulardii 250 mg Oral BID   sertraline 50 mg Oral Daily   sodium bicarbonate 1,300 mg Oral BID   sodium chloride 3 mL Intravenous Q12H         Assessment/Plan   Assessment / Plan     Active Hospital Problems    Diagnosis   • Hyperkalemia   • Metabolic encephalopathy   • Severe sepsis (CMS/HCC)   • Sepsis (CMS/HCC)   • Anemia of chronic renal failure, stage 5 (CMS/HCC)   • Diabetes mellitus, type II (CMS/HCC)   • CKD (chronic kidney disease) stage 4, GFR 15-29 ml/min (CMS/HCC)   • Chronic heart failure (CMS/HCC)   • UTI (urinary tract infection)   • Leukocytosis   • Hypertension        Brief Hospital Course to date:  Joy Bueon is a 68 y.o. female past medical history of type 2 diabetes, significant stage IV, chronic heart failure, hypertension.  Presents to [facility], which place with complaints for 5 days of lethargy, decreased by mouth intake, difficult to arouse.  Here patient has been admitted with sepsis and acute metabolic encephalopathy likely secondary to UTI.     Assessment & Plan:  - Severe sepsis  likely secondary to UTI, continue IVF, f/u blood cultures currently on Invanz, ID has been consulted  - Acute encephalopathy, likely multifactorial sec to uremia vs sepsis, improving  - Hx of ESBL UTI continue abx as above, f/u urine cultures  - NATALIO on CKD stage 4, with metabolic and respiratory acidosis baseline Cr unknown, suspect ATN sec to sepsis, renal consulted, plan for tunneled cath placement and AVF creation for HD,  - Suspected decompensated HF with elevated BNP and hypoxia, continue O2, diuresis will be limited with her renal failure, will await renal consult  - Previously well controlled T2DM AC1 6.8% (8/3) continue ssi  - Anemia worse, no si/sx of acute blood loss, suspect ACD, continue to monitor, s/p 3 unit PRBC transfuse Hg 6.9 this AM, transfuse 1 unit, trend  - Complex case     DVT Prophylaxis:  [facility]    CODE STATUS:   Code Status and Medical Interventions:   Ordered at: 10/14/18 1942     Level Of Support Discussed With:    Patient     Code Status:    CPR     Medical Interventions (Level of Support Prior to Arrest):    Full       Disposition: TBD      Electronically signed by Paula Silver MD, 10/16/18, 10:02 AM.      Electronically signed by Paula Silver MD at 10/16/2018 10:10 AM     Paula Silver MD at 10/15/2018  8:20 AM              [facility] Medicine Services  PROGRESS NOTE    Patient Name: Joy Bueno  : 1950  MRN: [id number]    Date of Admission: 10/14/2018  Length of Stay: 1  Primary Care Physician: Provider, No Known    Subjective   Subjective     CC: f/u sepsis    HPI:Patient remains lethargic, arouses easily but drifts back to sleep. Does endorse some chills.    Review of Systems  UTO complete ros sec to mental status    Objective   Objective     Vital Signs:   Temp:  [96.2 °F (35.7 °C)-98.2 °F (36.8 °C)] 96.2 °F (35.7 °C)  Heart Rate:  [50-77] 68  Resp:  [15-22] 15  BP: (136-164)/(55-96) 154/65  FiO2 (%):  [24 %] 24 %        Physical  Exam:  Constitutional: No acute distress, lethargic  HENT: NCAT, mucous membranes moist  Respiratory: Clear to auscultation bilaterally, respiratory effort normal   Cardiovascular: RRR, no murmurs, rubs, or gallops, palpable pedal pulses bilaterally  Gastrointestinal: Positive bowel sounds, soft, nontender, nondistended  Musculoskeletal: No bilateral ankle edema  Psychiatric: flat affect, cooperative  Neurologic: EDWIN  Skin: No rashes    Results Reviewed:  I have personally reviewed current lab, radiology, and data and agree.      Results from last 7 days  Lab Units 10/15/18  0633 10/14/18  1329   WBC 10*3/mm3 11.79* 14.20*   HEMOGLOBIN g/dL 5.1* 7.0*   HEMATOCRIT % 23.8* 23.1*   PLATELETS 10*3/mm3 194 238       Results from last 7 days  Lab Units 10/15/18  0625 10/14/18  1329   SODIUM mmol/L 144 144   POTASSIUM mmol/L 5.8* 5.9*   CHLORIDE mmol/L 115* 119*   CO2 mmol/L 20.0 18.0*   BUN mg/dL 77* 73*   CREATININE mg/dL 4.33* 4.29*   GLUCOSE mg/dL 82 96   CALCIUM mg/dL 8.2* 8.7   ALT (SGPT) U/L  --  14   AST (SGOT) U/L  --  15     Estimated Creatinine Clearance: 17.6 mL/min (A) (by C-G formula based on SCr of 4.33 mg/dL (H)).  BNP   Date Value Ref Range Status   10/14/2018 682.0 (H) 0.0 - 100.0 pg/mL Final     Comment:     Results may be falsely decreased if patient taking Biotin.       Microbiology Results Abnormal     Procedure Component Value - Date/Time    Urine Culture - Urine, [[id number]]  (Abnormal) Collected:  10/14/18 1340    Lab Status:  Preliminary result Specimen:  Urine from Urine, Catheter Updated:  10/15/18 0719     Urine Culture >100,000 CFU/mL Gram Negative Bacilli (A)    Blood Culture - Blood, [[id number]]  (Normal) Collected:  10/14/18 1606    Lab Status:  Preliminary result Specimen:  Blood from Arm, Right Updated:  10/15/18 0431     Blood Culture No growth at less than 24 hours    Blood Culture - Blood, [[id number]]  (Normal) Collected:  10/14/18 6629    Lab Status:  Preliminary result Specimen:   Blood from Arm, Left Updated:  10/15/18 0431     Blood Culture No growth at less than 24 hours          Imaging Results (last 24 hours)     Procedure Component Value Units Date/Time    XR Chest 2 View [[id number]] Collected:  10/14/18 1443     Updated:  10/14/18 1653    Narrative:          EXAMINATION: XR CHEST 2 VW - 10/14/2018     INDICATION: Lethargy.      COMPARISON: None.     FINDINGS: Two-view chest reveals the heart to be enlarged. Underlying  chronic changes seen throughout the lung fields bilaterally. No evidence  of acute parenchymal disease. Pulmonary vascularity is pronounced. No  focal parenchymal opacification present. Degenerative change is seen  within the spine.           Impression:       Low lung volumes with chronic changes seen within the lung  fields, enlargement heart and no evidence of acute parenchymal disease.     DICTATED:   10/14/2018  EDITED/ls :   10/14/2018      This report was finalized on 10/14/2018 4:51 PM by Dr. Rosalie Kline MD.       CT Head Without Contrast [[id number]] Collected:  10/14/18 1441     Updated:  10/14/18 1652    Narrative:       EXAMINATION: CT HEAD WO CONTRAST - 10/12/2018     INDICATION: Lethargy, weakness.     TECHNIQUE: Multiple axial CT imaging is obtained of the head from skull  base to skull vertex without the administration of intravenous contrast.     The radiation dose reduction device was turned on for each scan per the  ALARA (As Low as Reasonably Achievable) protocol.     COMPARISON: None.     FINDINGS: The parenchyma is grossly unremarkable. Some low-density area  is seen in the periventricular and subcortical white matter suggesting  chronic small vessel ischemic change. There is no hemorrhage or  hydrocephalus. No mass, mass effect or midline shift. Bony structures  reveal no evidence of osseous abnormality. There is mucosal thickening  of the left maxillary sinus and ethmoid air cells. Globes and orbits are  intact. The mastoid air cells are  patent.       Impression:       No acute intracranial abnormality with chronic changes seen  within the brain.     DICTATED:   10/14/2018  EDITED/ls :   10/14/2018      This report was finalized on 10/14/2018 4:50 PM by Dr. Rosalie Kline MD.           Results for orders placed during the hospital encounter of 09/02/17   Adult Transthoracic Echo Complete    Narrative · Left ventricular wall thickness is consistent with mild concentric   hypertrophy.  · Left atrial cavity size is mildly dilated.  · Mild mitral valve regurgitation is present  · calcification of the aortic valve  · Mild tricuspid valve regurgitation is present.          I have reviewed the medications.      amLODIPine 10 mg Oral Daily   atorvastatin 10 mg Oral Nightly   budesonide-formoterol 2 puff Inhalation BID - RT   carvedilol 25 mg Oral BID With Meals   ertapenem 1 g Intravenous Q24H   heparin (porcine) 5,000 Units Subcutaneous Q12H   hydrALAZINE 100 mg Oral TID   insulin lispro 0-7 Units Subcutaneous 4x Daily With Meals & Nightly   isosorbide mononitrate 30 mg Oral Daily   pantoprazole 40 mg Oral Q AM   polyethylene glycol 17 g Oral Daily   saccharomyces boulardii 250 mg Oral BID   sertraline 50 mg Oral Daily   sodium chloride 3 mL Intravenous Q12H         Assessment/Plan   Assessment / Plan     Active Hospital Problems    Diagnosis   • Hyperkalemia   • Metabolic encephalopathy   • Severe sepsis (CMS/HCC)   • Sepsis (CMS/HCC)   • Anemia of chronic renal failure, stage 5 (CMS/HCC)   • Diabetes mellitus, type II (CMS/HCC)   • CKD (chronic kidney disease) stage 4, GFR 15-29 ml/min (CMS/HCC)   • Chronic heart failure (CMS/HCC)   • UTI (urinary tract infection)   • Leukocytosis   • Hypertension        Brief Hospital Course to date:  Joy Bueno is a 68 y.o. female past medical history of type 2 diabetes, significant stage IV, chronic heart failure, hypertension.  Presents to [facility], which place with complaints for 5 days of lethargy,  decreased by mouth intake, difficult to arouse.  Here patient has been admitted with sepsis and acute metabolic encephalopathy likely secondary to UTI.    Assessment & Plan:  - Severe sepsis likely secondary to UTI, continue IVF, f/u blood cultures currently on Invanz, ID has been consulted  - Acute encephalopathy, likely multifactorial sec to uremia vs sepsis  - Hx of ESBL UTI continue abx as above, f/u urine cultures  - NATALIO on CKD stage 4, with metabolic and respiratory acidosis baseline Cr unknown, suspect ATN sec to sepsis, renal consulted.  - Suspected decompensated HF with elevated BNP and hypoxia, continue O2, diuresis will be limited with her renal failure, will await renal consult  - Previously well controlled T2DM AC1 6.8% (8/3) continue ssi  - Anemia, no si/sx of acute blood loss, suspect ACD, continue to monitor,s/p 1unit PRBC transfuse if Hg<7, f/u iron studies  - Complex case    DVT Prophylaxis:  [facility]    CODE STATUS:   Code Status and Medical Interventions:   Ordered at: 10/14/18 1942     Level Of Support Discussed With:    Patient     Code Status:    CPR     Medical Interventions (Level of Support Prior to Arrest):    Full       Disposition: TBD    Electronically signed by Paula Silver MD, 10/15/18, 8:36 AM.      Electronically signed by Paula Silver MD at 10/15/2018  8:38 AM

## 2018-10-17 NOTE — PROGRESS NOTES
"   LOS: 3 days    Patient Care Team:  Provider, No Known as PCP - General    Reason For Visit:  F/U ESRD.  Subjective           Review of Systems:    Pulm: No soa   CV:  No CP      Objective       amLODIPine 10 mg Oral Daily   atorvastatin 10 mg Oral Nightly   budesonide-formoterol 2 puff Inhalation BID - RT   carvedilol 25 mg Oral BID With Meals   epoetin porter 20,000 Units Subcutaneous Once per day on Mon Wed Fri   ertapenem 500 mg Intravenous Q24H   heparin (porcine) 5,000 Units Subcutaneous Q12H   hydrALAZINE 100 mg Oral TID   insulin lispro 0-7 Units Subcutaneous 4x Daily With Meals & Nightly   isosorbide mononitrate 30 mg Oral Daily   pantoprazole 40 mg Oral Q AM   Patiromer Sorbitex Calcium 16.8 g Oral Once   polyethylene glycol 17 g Oral Daily   saccharomyces boulardii 250 mg Oral BID   sertraline 50 mg Oral Daily   sodium bicarbonate 1,300 mg Oral BID   sodium chloride 3 mL Intravenous Q12H            Vital Signs:  Blood pressure 125/58, pulse 77, temperature 97.6 °F (36.4 °C), temperature source Oral, resp. rate 16, height 165.1 cm (65\"), weight 136 kg (300 lb), SpO2 99 %.    Flowsheet Rows      First Filed Value   Admission Height  165.1 cm (65\") Documented at 10/14/2018 1215   Admission Weight  136 kg (300 lb) Documented at 10/14/2018 1215          10/16 0701 - 10/17 0700  In: 1899 [P.O.:1037]  Out: 1000 [Urine:1000]    Physical Exam:    General Appearance: NAD, alert and cooperative, Ox3  Eyes: PER, conjunctivae and sclerae normal, no icterus  Lungs: respirations regular and unlabored, no crepitus, clear to auscultation  Heart/CV: regular rhythm & normal rate, no murmur, no gallop, no rub and TR. edema  Abdomen: not distended, soft, non-tender, no masses,  bowel sounds present  Skin: No rash, Warm and dry    Radiology:            Labs:    Results from last 7 days  Lab Units 10/17/18  0543 10/16/18  1936 10/16/18  0802 10/15/18  0633 10/14/18  1329   WBC 10*3/mm3  --   --  12.46* 11.79* 14.20* "   HEMOGLOBIN g/dL 9.0* 6.8* 6.9* 5.1* 7.0*   HEMATOCRIT % 29.0* 22.9* 22.8* 23.8* 23.1*   PLATELETS 10*3/mm3  --   --  210 194 238       Results from last 7 days  Lab Units 10/16/18  0802 10/15/18  0625 10/14/18  1329   SODIUM mmol/L 143 144 144   POTASSIUM mmol/L 5.6* 5.8* 5.9*   CHLORIDE mmol/L 115* 115* 119*   CO2 mmol/L 20.0 20.0 18.0*   BUN mg/dL 78* 77* 73*   CREATININE mg/dL 4.42* 4.33* 4.29*   CALCIUM mg/dL 8.2* 8.2* 8.7   PHOSPHORUS mg/dL 6.4*  --   --    MAGNESIUM mg/dL 1.5 1.3  --    ALBUMIN g/dL 3.12*  --  3.37       Results from last 7 days  Lab Units 10/16/18  0802   GLUCOSE mg/dL 147*         Results from last 7 days  Lab Units 10/14/18  1329   ALK PHOS U/L 86   BILIRUBIN mg/dL 0.2*   ALT (SGPT) U/L 14   AST (SGOT) U/L 15       Results from last 7 days  Lab Units 10/14/18  1851   PH, ARTERIAL pH units 7.186*   PO2 ART mm Hg 127.0*   PCO2, ARTERIAL mm Hg 48.1*   HCO3 ART mmol/L 18.2*         Results from last 7 days  Lab Units 10/14/18  1340   COLOR UA  Yellow   CLARITY UA  Turbid*   PH, URINE  6.0   SPECIFIC GRAVITY, URINE  1.020   GLUCOSE UA  Negative   KETONES UA  Negative   BILIRUBIN UA  Negative   PROTEIN UA  100 mg/dL (2+)*   BLOOD UA  Small (1+)*   LEUKOCYTES UA  Large (3+)*   NITRITE UA  Negative       Estimated Creatinine Clearance: 17.3 mL/min (A) (by C-G formula based on SCr of 4.42 mg/dL (H)).      Assessment       Hypertension    Leukocytosis    UTI (urinary tract infection)    Chronic heart failure (CMS/[facility])    CKD (chronic kidney disease) stage 4, GFR 15-29 ml/min (CMS/[facility])    Diabetes mellitus, type II (CMS/[facility])    Anemia of chronic renal failure, stage 5 (CMS/[facility])    Hyperkalemia    Metabolic encephalopathy    Severe sepsis (CMS/HCC)    Sepsis (CMS/HCC)            Impression: ESRD. ANEMIA.            Recommendations: TUNNELED HD TODAY. 1ST HD TX 10/18/18.      Rocky Nova MD  10/17/18  3:26 PM

## 2018-10-17 NOTE — PROGRESS NOTES
Continued Stay Note  [facility]     Patient Name: Joy Bueno  MRN: [id number]  Today's Date: 10/17/2018    Admit Date: 10/14/2018          Discharge Plan     Row Name 10/17/18 1247       Plan    Plan Comments TREVOR faxed updated clinicals to [facility]. TREVOR continues to follow for discharge needs.              Discharge Codes    No documentation.           ROGELIO Maloney

## 2018-10-18 ENCOUNTER — APPOINTMENT (OUTPATIENT)
Dept: NEPHROLOGY | Facility: HOSPITAL | Age: 68
End: 2018-10-18
Attending: SURGERY

## 2018-10-18 PROBLEM — N18.6 ESRD (END STAGE RENAL DISEASE) (HCC): Status: ACTIVE | Noted: 2017-08-03

## 2018-10-18 LAB
ABO + RH BLD: NORMAL
ABO + RH BLD: NORMAL
ALBUMIN SERPL-MCNC: 3.2 G/DL (ref 3.2–4.8)
ALP SERPL-CCNC: 81 U/L (ref 25–100)
ALT SERPL W P-5'-P-CCNC: 13 U/L (ref 7–40)
ANION GAP SERPL CALCULATED.3IONS-SCNC: 9 MMOL/L (ref 3–11)
AST SERPL-CCNC: 12 U/L (ref 0–33)
BH BB BLOOD EXPIRATION DATE: NORMAL
BH BB BLOOD EXPIRATION DATE: NORMAL
BH BB BLOOD TYPE BARCODE: 7300
BH BB BLOOD TYPE BARCODE: 7300
BH BB DISPENSE STATUS: NORMAL
BH BB DISPENSE STATUS: NORMAL
BH BB PRODUCT CODE: NORMAL
BH BB PRODUCT CODE: NORMAL
BH BB UNIT NUMBER: NORMAL
BH BB UNIT NUMBER: NORMAL
BILIRUB CONJ SERPL-MCNC: 0.1 MG/DL (ref 0–0.2)
BILIRUB INDIRECT SERPL-MCNC: 0.2 MG/DL (ref 0.1–1.1)
BILIRUB SERPL-MCNC: 0.3 MG/DL (ref 0.3–1.2)
BUN BLD-MCNC: 78 MG/DL (ref 9–23)
BUN/CREAT SERPL: 17.3 (ref 7–25)
CALCIUM SPEC-SCNC: 8.7 MG/DL (ref 8.7–10.4)
CHLORIDE SERPL-SCNC: 113 MMOL/L (ref 99–109)
CO2 SERPL-SCNC: 20 MMOL/L (ref 20–31)
CREAT BLD-MCNC: 4.5 MG/DL (ref 0.6–1.3)
GFR SERPL CREATININE-BSD FRML MDRD: 12 ML/MIN/1.73
GLUCOSE BLD-MCNC: 110 MG/DL (ref 70–100)
GLUCOSE BLDC GLUCOMTR-MCNC: 107 MG/DL (ref 70–130)
GLUCOSE BLDC GLUCOMTR-MCNC: 121 MG/DL (ref 70–130)
GLUCOSE BLDC GLUCOMTR-MCNC: 162 MG/DL (ref 70–130)
GLUCOSE BLDC GLUCOMTR-MCNC: 176 MG/DL (ref 70–130)
GLUCOSE BLDC GLUCOMTR-MCNC: 184 MG/DL (ref 70–130)
PHOSPHATE SERPL-MCNC: 5.8 MG/DL (ref 2.4–5.1)
POTASSIUM BLD-SCNC: 5 MMOL/L (ref 3.5–5.5)
PROT SERPL-MCNC: 6.2 G/DL (ref 5.7–8.2)
SODIUM BLD-SCNC: 142 MMOL/L (ref 132–146)
UNIT  ABO: NORMAL
UNIT  ABO: NORMAL
UNIT  RH: NORMAL
UNIT  RH: NORMAL

## 2018-10-18 PROCEDURE — 5A1D70Z PERFORMANCE OF URINARY FILTRATION, INTERMITTENT, LESS THAN 6 HOURS PER DAY: ICD-10-PCS | Performed by: INTERNAL MEDICINE

## 2018-10-18 PROCEDURE — 84100 ASSAY OF PHOSPHORUS: CPT | Performed by: INTERNAL MEDICINE

## 2018-10-18 PROCEDURE — 94799 UNLISTED PULMONARY SVC/PX: CPT

## 2018-10-18 PROCEDURE — 82962 GLUCOSE BLOOD TEST: CPT

## 2018-10-18 PROCEDURE — 25010000002 ERTAPENEM PER 500 MG: Performed by: INTERNAL MEDICINE

## 2018-10-18 PROCEDURE — 80076 HEPATIC FUNCTION PANEL: CPT | Performed by: INTERNAL MEDICINE

## 2018-10-18 PROCEDURE — 80048 BASIC METABOLIC PNL TOTAL CA: CPT | Performed by: INTERNAL MEDICINE

## 2018-10-18 PROCEDURE — 25010000002 HEPARIN (PORCINE) PER 1000 UNITS: Performed by: NURSE PRACTITIONER

## 2018-10-18 PROCEDURE — 99233 SBSQ HOSP IP/OBS HIGH 50: CPT | Performed by: INTERNAL MEDICINE

## 2018-10-18 RX ORDER — ALBUMIN (HUMAN) 12.5 G/50ML
25 SOLUTION INTRAVENOUS AS NEEDED
Status: ACTIVE | OUTPATIENT
Start: 2018-10-18 | End: 2018-10-19

## 2018-10-18 RX ADMIN — HYDRALAZINE HYDROCHLORIDE 100 MG: 50 TABLET, FILM COATED ORAL at 20:40

## 2018-10-18 RX ADMIN — ACETAMINOPHEN 650 MG: 325 TABLET ORAL at 22:52

## 2018-10-18 RX ADMIN — Medication 3 ML: at 21:22

## 2018-10-18 RX ADMIN — HYDRALAZINE HYDROCHLORIDE 100 MG: 50 TABLET, FILM COATED ORAL at 17:21

## 2018-10-18 RX ADMIN — INSULIN LISPRO 2 UNITS: 100 INJECTION, SOLUTION INTRAVENOUS; SUBCUTANEOUS at 18:19

## 2018-10-18 RX ADMIN — ERTAPENEM SODIUM 500 MG: 1 INJECTION, POWDER, LYOPHILIZED, FOR SOLUTION INTRAMUSCULAR; INTRAVENOUS at 17:21

## 2018-10-18 RX ADMIN — CARVEDILOL 25 MG: 12.5 TABLET, FILM COATED ORAL at 09:42

## 2018-10-18 RX ADMIN — ATORVASTATIN CALCIUM 10 MG: 10 TABLET, FILM COATED ORAL at 20:39

## 2018-10-18 RX ADMIN — SODIUM BICARBONATE 650 MG TABLET 1300 MG: at 20:39

## 2018-10-18 RX ADMIN — HYDROCORTISONE 2.5%: 25 CREAM TOPICAL at 23:41

## 2018-10-18 RX ADMIN — HYDRALAZINE HYDROCHLORIDE 100 MG: 50 TABLET, FILM COATED ORAL at 09:41

## 2018-10-18 RX ADMIN — CARVEDILOL 25 MG: 12.5 TABLET, FILM COATED ORAL at 17:21

## 2018-10-18 RX ADMIN — Medication 250 MG: at 20:39

## 2018-10-18 RX ADMIN — SERTRALINE HYDROCHLORIDE 50 MG: 50 TABLET ORAL at 09:41

## 2018-10-18 RX ADMIN — PANTOPRAZOLE SODIUM 40 MG: 40 TABLET, DELAYED RELEASE ORAL at 06:07

## 2018-10-18 RX ADMIN — Medication 3 ML: at 09:48

## 2018-10-18 RX ADMIN — INSULIN LISPRO 2 UNITS: 100 INJECTION, SOLUTION INTRAVENOUS; SUBCUTANEOUS at 20:38

## 2018-10-18 RX ADMIN — Medication 250 MG: at 09:42

## 2018-10-18 RX ADMIN — PANTOPRAZOLE SODIUM 40 MG: 40 TABLET, DELAYED RELEASE ORAL at 09:02

## 2018-10-18 RX ADMIN — SODIUM BICARBONATE 650 MG TABLET 1300 MG: at 09:41

## 2018-10-18 RX ADMIN — HEPARIN SODIUM 5000 UNITS: 5000 INJECTION, SOLUTION INTRAVENOUS; SUBCUTANEOUS at 09:41

## 2018-10-18 RX ADMIN — BUDESONIDE AND FORMOTEROL FUMARATE DIHYDRATE 2 PUFF: 160; 4.5 AEROSOL RESPIRATORY (INHALATION) at 18:57

## 2018-10-18 RX ADMIN — AMLODIPINE BESYLATE 10 MG: 10 TABLET ORAL at 09:41

## 2018-10-18 RX ADMIN — ISOSORBIDE MONONITRATE 30 MG: 30 TABLET, EXTENDED RELEASE ORAL at 09:42

## 2018-10-18 RX ADMIN — HEPARIN SODIUM 5000 UNITS: 5000 INJECTION, SOLUTION INTRAVENOUS; SUBCUTANEOUS at 20:40

## 2018-10-18 NOTE — PLAN OF CARE
Problem: Patient Care Overview  Goal: Plan of Care Review  Outcome: Ongoing (interventions implemented as appropriate)   10/18/18 1709   Coping/Psychosocial   Plan of Care Reviewed With patient   Plan of Care Review   Progress improving   OTHER   Outcome Summary patient is isolation precautions. had dialysis today. will go again tomorrow. vss      Goal: Individualization and Mutuality  Outcome: Ongoing (interventions implemented as appropriate)   10/14/18 1800   Individualization   Patient Specific Interventions Call light within reach, bed/chair alarm activated,        Problem: Fall Risk (Adult)  Goal: Identify Related Risk Factors and Signs and Symptoms  Outcome: Ongoing (interventions implemented as appropriate)   10/17/18 1816   Fall Risk (Adult)   Related Risk Factors (Fall Risk) fatigue/slow reaction;gait/mobility problems   Signs and Symptoms (Fall Risk) presence of risk factors     Goal: Absence of Fall  Outcome: Ongoing (interventions implemented as appropriate)   10/18/18 1709   Fall Risk (Adult)   Absence of Fall making progress toward outcome       Problem: Skin Injury Risk (Adult)  Goal: Identify Related Risk Factors and Signs and Symptoms  Outcome: Ongoing (interventions implemented as appropriate)   10/18/18 0421   Skin Injury Risk (Adult)   Related Risk Factors (Skin Injury Risk) advanced age;body weight extremes;mobility impaired     Goal: Skin Health and Integrity  Outcome: Ongoing (interventions implemented as appropriate)   10/18/18 1709   Skin Injury Risk (Adult)   Skin Health and Integrity making progress toward outcome       Problem: Anemia (Adult)  Goal: Identify Related Risk Factors and Signs and Symptoms  Outcome: Ongoing (interventions implemented as appropriate)   10/16/18 1341   Anemia (Adult)   Related Risk Factors (Anemia) chronic illness   Signs and Symptoms (Anemia) fatigue     Goal: Symptom Improvement  Outcome: Ongoing (interventions implemented as appropriate)   10/18/18 1709    Anemia (Adult)   Symptom Improvement making progress toward outcome       Problem: Hemodialysis (Adult)  Goal: Signs and Symptoms of Listed Potential Problems Will be Absent, Minimized or Managed (Hemodialysis)  Outcome: Ongoing (interventions implemented as appropriate)   10/18/18 8787   Goal/Outcome Evaluation   Problems Assessed (Hemodialysis) all   Problems Present (Hemodialysis) situational response

## 2018-10-18 NOTE — PROGRESS NOTES
Continued Stay Note  [facility]     Patient Name: Joy Bueno  MRN: [id number]  Today's Date: 10/18/2018    Admit Date: 10/14/2018          Discharge Plan     Row Name 10/18/18 1239       Plan    Plan Comments SW received consult to arrange outpatient hemodialysis. SW spoke with pt's daughter who reports she would like it arranged for pt at [facility]. Pt's daughter, Tessie, also reports that she would like pt to go to [facility] for rehab and then to long term placement. Will need PT and OT to see and work with pt. TREVOR called referral to Sal[facility] at [facility]. TREVOR then Called [facility] [phone] to initiate outpatient dialysis. TREVOR initiated outpatient dialysis with [facility] admissions staff and then faxed the needed paperwork to [facility]. SW awaiting call back from assigned [facility] .               Discharge Codes    No documentation.           ROGELIO Malonye

## 2018-10-18 NOTE — PROGRESS NOTES
INFECTIOUS DISEASE PROGRESS NOTE    Joy Bueno  1950  [id number]      Admission Date: 10/14/2018      Requesting Provider: No ref. provider found  Evaluating Physician: Velasquez Olivo MD    Reason for Consultation:  ESBL Ecoli  UTI      History of present illness:    10/15/18: Patient is a 68 y.o. female seen today for a UTI.  She was brought from her nursing home with increasing lethargy, and  confusion over the past several days.  She was hospitalized here in August and treated with Invanz for an ESBL Ecoli UTI.  On admission, her urinalysis had too  Numerous to count white cells, and urine culture now with Gram negative rods . She is hypothermic,  With a leukocytosis of 14, 000,  hgb of 7.0, Scr 4.29. Ertapenem was initiated and we were consulted for evaluation and treatment.    10/16/18: She denies nausea and vomiting.  She has decreased dyspnea.  She is scheduled for hemodialysis access by Dr. Calderón.  I discussed her situation with Dr. Calderón today.  10/17/18:  She is now on nasal cannula.  She will  Have her dialysis access later today.  Remains afebrile.   10/18/18: She denies dysuria.  The nursing staff indicates she had approximately 4-5 100 cc of urine out over the day.  She underwent dialysis access placement yesterday.  She has remained afebrile.    Past Medical History:   Diagnosis Date   • Anemia    • Anxiety    • Asthma    • Chronic kidney disease    • Diabetes mellitus (CMS/[facility])    • Diabetes mellitus, type II (CMS/[facility]) 8/3/2018   • History of Clostridium difficile infection 8/3/2018   • History of respiratory failure, since off home oxygen 8/3/2018   • History of UTI 8/3/2018   • Hypertension    • Morbid obesity (CMS/[facility])    • Osteoarthritis    • Tinea capitis 8/3/2018       Past Surgical History:   Procedure Laterality Date   •  SECTION     • COLONOSCOPY N/A 2017    Procedure: COLONOSCOPY;  Surgeon: Mark I Brunner, MD;  Location: [facility] ENDOSCOPY;  Service:    •  ENDOSCOPY N/A 8/25/2017    Procedure: ESOPHAGOGASTRODUODENOSCOPY ;  Surgeon: Velasquez Call MD;  Location: [facility] ENDOSCOPY;  Service:    • HERNIA REPAIR     • INSERTION HEMODIALYSIS CATHETER Right 10/17/2018    Procedure: HEMODIALYSIS CATHETER INSERTION;  Surgeon: Carlos Enrique Calderón MD;  Location: [facility] OR;  Service: General       Family History   Problem Relation Age of Onset   • Cardiomyopathy Mother    • Stroke Father    • Diabetes Father        Social History     Social History   • Marital status:      Spouse name: N/A   • Number of children: N/A   • Years of education: N/A     Occupational History   • Not on file.     Social History Main Topics   • Smoking status: Former Smoker     Packs/day: 0.25   • Smokeless tobacco: Not on file      Comment: unknown when quit, maybe last year   • Alcohol use No   • Drug use: No   • Sexual activity: No     Other Topics Concern   • Not on file     Social History Narrative    Mrs. Bueno is a 68 year old AA  female. She lives alone in [place] but has been in rehab for some time. She has a daughter. She does not have an advanced directive.       Allergies   Allergen Reactions   • Geodon [Ziprasidone Hcl] Unknown (See Comments)     PT DOESN'T REMEMBER   • Haldol [Haloperidol Lactate] Unknown (See Comments)     PT DOESN'T REMEMBER   • Seroquel [Quetiapine Fumarate] Unknown (See Comments)     PT DOESN'T REMEMBER         Medication:    Current Facility-Administered Medications:   •  acetaminophen (TYLENOL) tablet 650 mg, 650 mg, Oral, Q4H PRN, Doris Heard, APRN, 650 mg at 10/17/18 2307  •  albumin human 25 % IV SOLN 12.5 g, 12.5 g, Intravenous, PRN, Rocky Nova MD  •  albumin human 25 % IV SOLN 25 g, 25 g, Intravenous, PRN, Irineo Latham MD  •  amLODIPine (NORVASC) tablet 10 mg, 10 mg, Oral, Daily, Doris Heard APRN, 10 mg at 10/18/18 0941  •  atorvastatin (LIPITOR) tablet 10 mg, 10 mg, Oral, Nightly, Doris Heard APRN,  10 mg at 10/17/18 2137  •  budesonide-formoterol (SYMBICORT) 160-4.5 MCG/ACT inhaler 2 puff, 2 puff, Inhalation, BID - RT, Doris Heard APRN, 2 puff at 10/18/18 1857  •  carvedilol (COREG) tablet 25 mg, 25 mg, Oral, BID With Meals, Doris Heard APRN, 25 mg at 10/18/18 1721  •  dextrose (D50W) 25 g/ 50mL Intravenous Solution 25 g, 25 g, Intravenous, Q15 Min PRN, Doris Heard APRN  •  dextrose (GLUTOSE) oral gel 15 g, 15 g, Oral, Q15 Min PRN, Doris Heard APRN  •  epoetin porter (EPOGEN,PROCRIT) injection 20,000 Units, 20,000 Units, Subcutaneous, Once per day on Mon Wed Fri, Rocky Nova MD, Stopped at 10/17/18 0911  •  glucagon (human recombinant) (GLUCAGEN DIAGNOSTIC) injection 1 mg, 1 mg, Subcutaneous, PRN, Doris Heard APRN  •  heparin (porcine) 5000 UNIT/ML injection 5,000 Units, 5,000 Units, Subcutaneous, Q12H, Doris Heard APRN, 5,000 Units at 10/18/18 0941  •  hydrALAZINE (APRESOLINE) tablet 100 mg, 100 mg, Oral, TID, Doris Heard APRN, 100 mg at 10/18/18 1721  •  Influenza Vac Subunit Quad (FLUCELVAX) injection 0.5 mL, 0.5 mL, Intramuscular, During Hospitalization, Paula Silver MD  •  insulin lispro (humaLOG) injection 0-7 Units, 0-7 Units, Subcutaneous, 4x Daily With Meals & Nightly, Doris Heard APRN, 2 Units at 10/18/18 1819  •  isosorbide mononitrate (IMDUR) 24 hr tablet 30 mg, 30 mg, Oral, Daily, Doris Heard APRN, 30 mg at 10/18/18 0942  •  lactated ringers infusion, 9 mL/hr, Intravenous, Continuous, Jesus Benavides MD, Stopped at 10/17/18 2001  •  ondansetron (ZOFRAN) tablet 4 mg, 4 mg, Oral, Q6H PRN **OR** ondansetron (ZOFRAN) injection 4 mg, 4 mg, Intravenous, Q6H PRN, Doris Heard APRN  •  pantoprazole (PROTONIX) EC tablet 40 mg, 40 mg, Oral, Q AM, Doris Heard APRN, 40 mg at 10/18/18 0902  •  Patiromer Sorbitex Calcium pack 1 packet, 16.8 g, Oral, Once, Chaparrita Olmedo, [facility], Stopped at 10/15/18 1632  •  polyethylene glycol 3350  powder (packet), 17 g, Oral, Daily, Doris Heard APRN, Stopped at 10/17/18 0912  •  saccharomyces boulardii (FLORASTOR) capsule 250 mg, 250 mg, Oral, BID, Doris Heard APRN, 250 mg at 10/18/18 0942  •  sertraline (ZOLOFT) tablet 50 mg, 50 mg, Oral, Daily, Doris Heard APRN, 50 mg at 10/18/18 0941  •  sodium bicarbonate tablet 1,300 mg, 1,300 mg, Oral, BID, Rocky Nova MD, 1,300 mg at 10/18/18 0941  •  sodium chloride 0.9 % flush 3 mL, 3 mL, Intravenous, Q12H, Doris Heard APRN, 3 mL at 10/18/18 0948  •  sodium chloride 0.9 % flush 3-10 mL, 3-10 mL, Intravenous, PRN, Doris Heard APRN    Antibiotics:  Anti-Infectives     Ordered     Dose/Rate Route Frequency Start Stop    10/14/18 1512  ertapenem (INVanz) 1 g/100 mL 0.9% NS VTB (mbp)     Ordering Provider:  Raheel Brunson PA    1 g  over 30 Minutes Intravenous Once 10/14/18 1514 10/14/18 1701            Review of Systems:  No reliable ROS from patient     Physical Exam:   Vital Signs  Temp (24hrs), Av.5 °F (36.4 °C), Min:96.7 °F (35.9 °C), Max:98.4 °F (36.9 °C)    Temp  Min: 96.7 °F (35.9 °C)  Max: 98.4 °F (36.9 °C)  BP  Min: 132/56  Max: 199/87  Pulse  Min: 59  Max: 78  Resp  Min: 18  Max: 21  SpO2  Min: 94 %  Max: 99 %    GENERAL drowsy .  She is in no acute distress..   HEENT: Normocephalic, atraumatic.  PERRL. EOMI. No conjunctival injection. No icterus. Oropharynx clear without evidence of thrush or exudate.   NECK: Supple without nuchal rigidity. No mass.  LYMPH: No cervical, axillary or inguinal lymphadenopathy.  HEART: RRR; No murmur, rubs, gallops.   LUNGS:  Few rhonchi  Anteriorly   ABDOMEN: Soft, nontender,firm,  bowel sounds quiet  No rebound or guarding. NO mass or HSM.  EXT:  No cyanosis, clubbing or edema. No cord.  : Genitalia generally unremarkable. purewick in place   MSK: FROM without joint effusions noted arms/legs.    SKIN: Warm and dry  Scalp eczema,   Neuro: drowsy but is  confused.    Laboratory Data      Results from last 7 days  Lab Units 10/17/18  0543 10/16/18  1936 10/16/18  0802 10/15/18  0633 10/14/18  1329   WBC 10*3/mm3  --   --  12.46* 11.79* 14.20*   HEMOGLOBIN g/dL 9.0* 6.8* 6.9* 5.1* 7.0*   HEMATOCRIT % 29.0* 22.9* 22.8* 23.8* 23.1*   PLATELETS 10*3/mm3  --   --  210 194 238       Results from last 7 days  Lab Units 10/18/18  0638   SODIUM mmol/L 142   POTASSIUM mmol/L 5.0   CHLORIDE mmol/L 113*   CO2 mmol/L 20.0   BUN mg/dL 78*   CREATININE mg/dL 4.50*   GLUCOSE mg/dL 110*   CALCIUM mg/dL 8.7       Results from last 7 days  Lab Units 10/18/18  0638   ALK PHOS U/L 81   BILIRUBIN mg/dL 0.3   BILIRUBIN DIRECT mg/dL 0.1   ALT (SGPT) U/L 13   AST (SGOT) U/L 12           UA  TNTC WBCS              Estimated Creatinine Clearance: 17 mL/min (A) (by C-G formula based on SCr of 4.5 mg/dL (H)).      Microbiology:  Blood Culture   Date Value Ref Range Status   10/14/2018 No growth at less than 24 hours  Preliminary   10/14/2018 No growth at less than 24 hours  Preliminary                    Urine Culture   Date Value Ref Range Status   10/14/2018 >100,000 CFU/mL Gram Negative Bacilli (A)  Preliminary      Urine culture is growing ESBL Klebsiella        Radiology:  Imaging Results (last 72 hours)     Procedure Component Value Units Date/Time    XR Chest 2 View [[id number]] Collected:  10/14/18 1443     Updated:  10/14/18 1653    Narrative:          EXAMINATION: XR CHEST 2 VW - 10/14/2018     INDICATION: Lethargy.      COMPARISON: None.     FINDINGS: Two-view chest reveals the heart to be enlarged. Underlying  chronic changes seen throughout the lung fields bilaterally. No evidence  of acute parenchymal disease. Pulmonary vascularity is pronounced. No  focal parenchymal opacification present. Degenerative change is seen  within the spine.           Impression:       Low lung volumes with chronic changes seen within the lung  fields, enlargement heart and no evidence of acute  parenchymal disease.     DICTATED:   10/14/2018  EDITED/ls :   10/14/2018      This report was finalized on 10/14/2018 4:51 PM by Dr. Rosalie Kline MD.       CT Head Without Contrast [[id number]] Collected:  10/14/18 1441     Updated:  10/14/18 1652    Narrative:       EXAMINATION: CT HEAD WO CONTRAST - 10/12/2018     INDICATION: Lethargy, weakness.     TECHNIQUE: Multiple axial CT imaging is obtained of the head from skull  base to skull vertex without the administration of intravenous contrast.     The radiation dose reduction device was turned on for each scan per the  ALARA (As Low as Reasonably Achievable) protocol.     COMPARISON: None.     FINDINGS: The parenchyma is grossly unremarkable. Some low-density area  is seen in the periventricular and subcortical white matter suggesting  chronic small vessel ischemic change. There is no hemorrhage or  hydrocephalus. No mass, mass effect or midline shift. Bony structures  reveal no evidence of osseous abnormality. There is mucosal thickening  of the left maxillary sinus and ethmoid air cells. Globes and orbits are  intact. The mastoid air cells are patent.       Impression:       No acute intracranial abnormality with chronic changes seen  within the brain.     DICTATED:   10/14/2018  EDITED/ls :   10/14/2018      This report was finalized on 10/14/2018 4:50 PM by Dr. Rosalie Kline MD.               Impression:   1.  ESBL Klebsiella UTI-I will discontinue her Invanz therapy.   2.  Anemia-severe.   3.  Acute/chronic renal failure/ATN   4.  History of Cdiff-she will remain at risk for relapse of C. difficile colitis due to the need to treat her UTI.  5.  Diabetes Mellitus, type 2-this places her at increased risk for recurrent infections  6.  Encephalopathy-toxic metabolic-this may be secondary to her UTI    PLAN/RECOMMENDATIONS:   1.  Discontinue Invanz  2.  Discharge soon    Velasquez Olivo MD  10/18/2018  7:50 PM

## 2018-10-18 NOTE — PROGRESS NOTES
"   LOS: 4 days    Patient Care Team:  Provider, No Known as PCP - General    Reason For Visit:  Diabetic nephropathy biopsy proven, stage V CK D, no requiring dialysis.  Tunnel catheter place 10/17/2018.  Dialysis started 10/18/2018, patient tolerating well  Subjective   Dialysis in progress.        Review of Systems:    Patient denies shortness of breath, chest pain, dysuria, hematuria, nausea, vomiting.        Objective       amLODIPine 10 mg Oral Daily   atorvastatin 10 mg Oral Nightly   budesonide-formoterol 2 puff Inhalation BID - RT   carvedilol 25 mg Oral BID With Meals   epoetin porter 20,000 Units Subcutaneous Once per day on Mon Wed Fri   ertapenem 500 mg Intravenous Q24H   heparin (porcine) 5,000 Units Subcutaneous Q12H   hydrALAZINE 100 mg Oral TID   insulin lispro 0-7 Units Subcutaneous 4x Daily With Meals & Nightly   isosorbide mononitrate 30 mg Oral Daily   pantoprazole 40 mg Oral Q AM   Patiromer Sorbitex Calcium 16.8 g Oral Once   polyethylene glycol 17 g Oral Daily   saccharomyces boulardii 250 mg Oral BID   sertraline 50 mg Oral Daily   sodium bicarbonate 1,300 mg Oral BID   sodium chloride 3 mL Intravenous Q12H       lactated ringers 9 mL/hr Last Rate: Stopped (10/17/18 2001)         Vital Signs:  Blood pressure (!) 184/80, pulse 71, temperature 97.4 °F (36.3 °C), resp. rate 20, height 165.1 cm (65\"), weight 136 kg (300 lb), SpO2 99 %.    Flowsheet Rows      First Filed Value   Admission Height  165.1 cm (65\") Documented at 10/14/2018 1215   Admission Weight  136 kg (300 lb) Documented at 10/14/2018 1215          10/17 0701 - 10/18 0700  In: 500 [P.O.:400; I.V.:100]  Out: 300 [Urine:300]    Physical Exam:  General Appearance: Alert, oriented, no obvious distress.  Eyes: PER, EOMI.  Neck: Supple no JVD.  Lungs: Clear auscultation, no rales rhonchi's, equal chest movement, nonlabored.  Heart: No gallop, murmur, rub, RRR.  Abdomen: Soft, nontender, positive bowel sounds, no organomegaly.  Extremities: " No edema, no cyanosis.  Neuro: No focal deficit, moving all extremities, alert oriented×3  Neck: Tunnel catheter right side noted.  No sinus infection or bleeding        Labs:    Results from last 7 days  Lab Units 10/17/18  0543 10/16/18  1936 10/16/18  0802 10/15/18  0633 10/14/18  1329   WBC 10*3/mm3  --   --  12.46* 11.79* 14.20*   HEMOGLOBIN g/dL 9.0* 6.8* 6.9* 5.1* 7.0*   HEMATOCRIT % 29.0* 22.9* 22.8* 23.8* 23.1*   PLATELETS 10*3/mm3  --   --  210 194 238       Results from last 7 days  Lab Units 10/18/18  0638 10/16/18  0802 10/15/18  0625 10/14/18  1329   SODIUM mmol/L 142 143 144 144   POTASSIUM mmol/L 5.0 5.6* 5.8* 5.9*   CHLORIDE mmol/L 113* 115* 115* 119*   CO2 mmol/L 20.0 20.0 20.0 18.0*   BUN mg/dL 78* 78* 77* 73*   CREATININE mg/dL 4.50* 4.42* 4.33* 4.29*   CALCIUM mg/dL 8.7 8.2* 8.2* 8.7   PHOSPHORUS mg/dL 5.8* 6.4*  --   --    MAGNESIUM mg/dL  --  1.5 1.3  --    ALBUMIN g/dL 3.20 3.12*  --  3.37       Results from last 7 days  Lab Units 10/18/18  0638   GLUCOSE mg/dL 110*         Results from last 7 days  Lab Units 10/18/18  0638   ALK PHOS U/L 81   BILIRUBIN mg/dL 0.3   BILIRUBIN DIRECT mg/dL 0.1   ALT (SGPT) U/L 13   AST (SGOT) U/L 12       Results from last 7 days  Lab Units 10/14/18  1851   PH, ARTERIAL pH units 7.186*   PO2 ART mm Hg 127.0*   PCO2, ARTERIAL mm Hg 48.1*   HCO3 ART mmol/L 18.2*         Results from last 7 days  Lab Units 10/14/18  1340   COLOR UA  Yellow   CLARITY UA  Turbid*   PH, URINE  6.0   SPECIFIC GRAVITY, URINE  1.020   GLUCOSE UA  Negative   KETONES UA  Negative   BILIRUBIN UA  Negative   PROTEIN UA  100 mg/dL (2+)*   BLOOD UA  Small (1+)*   LEUKOCYTES UA  Large (3+)*   NITRITE UA  Negative       Estimated Creatinine Clearance: 17 mL/min (A) (by C-G formula based on SCr of 4.5 mg/dL (H)).      Assessment      1.  Diabetic nephropathy biopsy proven: Now end-stage renal disease requiring dialysis.  2.  Hyperkalemia.  Corrected with dialysis.  3.  Anemia of chronic  disease.  4.  Diabetes type 2.  5.  UTI: Severe sepsis,.  6.  Metabolic encephalopathy improving.    Plan dialysis again tomorrow.  We will try to get outpatient dialysis.  Home with outpatient dialysis is arranged.           Irineo Latham MD  10/18/18  8:50 AM

## 2018-10-18 NOTE — PROGRESS NOTES
[facility] Medicine Services  PROGRESS NOTE    Patient Name: Joy Bueno  : 1950  MRN: [id number]    Date of Admission: 10/14/2018  Length of Stay: 4  Primary Care Physician: Provider, No Known    Subjective   Subjective     CC:sepsis    HPI:No acute events overnight, patient is s/p HD this morning, feeling much better.    Review of Systems  Gen- No fevers, chills  CV- No chest pain, palpitations  Resp- No cough, dyspnea  GI- No N/V/D, abd pain    Otherwise ROS is negative except as mentioned in the HPI.    Objective   Objective     Vital Signs:   Temp:  [96.7 °F (35.9 °C)-98.7 °F (37.1 °C)] 97.4 °F (36.3 °C)  Heart Rate:  [59-78] 78  Resp:  [14-20] 20  BP: (114-199)/(55-87) 199/87       Physical Exam:  Constitutional: chronically ill female, in no acute distress, awake, alert  HENT: NCAT, mucous membranes moist  Respiratory: non labored respirations, no wheezes or rhonchi   Cardiovascular: RRR, no murmurs, rubs, or gallops, palpable pedal pulses bilaterally  Gastrointestinal: Obese, positive bowel sounds, soft, nontender, nondistended  Musculoskeletal: No bilateral ankle edema  Psychiatric: Appropriate affect, cooperative  Neurologic: Oriented x 3, no focal deficits  Skin: No rashes    Results Reviewed:  I have personally reviewed current lab, radiology, and data and agree.      Results from last 7 days  Lab Units 10/17/18  0543 10/16/18  1936 10/16/18  0802 10/15/18  0633 10/14/18  1329   WBC 10*3/mm3  --   --  12.46* 11.79* 14.20*   HEMOGLOBIN g/dL 9.0* 6.8* 6.9* 5.1* 7.0*   HEMATOCRIT % 29.0* 22.9* 22.8* 23.8* 23.1*   PLATELETS 10*3/mm3  --   --  210 194 238       Results from last 7 days  Lab Units 10/18/18  0638 10/16/18  0802 10/15/18  0625 10/14/18  1329   SODIUM mmol/L 142 143 144 144   POTASSIUM mmol/L 5.0 5.6* 5.8* 5.9*   CHLORIDE mmol/L 113* 115* 115* 119*   CO2 mmol/L 20.0 20.0 20.0 18.0*   BUN mg/dL 78* 78* 77* 73*   CREATININE mg/dL 4.50* 4.42* 4.33* 4.29*    GLUCOSE mg/dL 110* 147* 82 96   CALCIUM mg/dL 8.7 8.2* 8.2* 8.7   ALT (SGPT) U/L 13  --   --  14   AST (SGOT) U/L 12  --   --  15     Estimated Creatinine Clearance: 17 mL/min (A) (by C-G formula based on SCr of 4.5 mg/dL (H)).  No results found for: BNP    Microbiology Results Abnormal     Procedure Component Value - Date/Time    Blood Culture - Blood, [[id number]]  (Normal) Collected:  10/14/18 1606    Lab Status:  Preliminary result Specimen:  Blood from Arm, Left Updated:  10/17/18 1630     Blood Culture No growth at 3 days    Blood Culture - Blood, [[id number]]  (Normal) Collected:  10/14/18 1606    Lab Status:  Preliminary result Specimen:  Blood from Arm, Right Updated:  10/17/18 1630     Blood Culture No growth at 3 days    Urine Culture - Urine, [[id number]]  (Abnormal)  (Susceptibility) Collected:  10/14/18 1340    Lab Status:  Final result Specimen:  Urine from Urine, Catheter Updated:  10/16/18 1403     Urine Culture >100,000 CFU/mL Klebsiella pneumoniae ESBL (C)     Comment:   Consider infectious disease consult.  Susceptibility results may not correlate to clinical outcomes.       Susceptibility      Klebsiella pneumoniae ESBL     AL     Ciprofloxacin >2 ug/ml Resistant     Ertapenem <=1 ug/ml Susceptible     Gentamicin <=4 ug/ml Susceptible     Levofloxacin >4 ug/ml Resistant     Meropenem <=1 ug/ml Susceptible     Nitrofurantoin 64 ug/ml Intermediate     Piperacillin + Tazobactam <=16 ug/ml Susceptible     Tetracycline >8 ug/ml Resistant     Tobramycin >8 ug/ml Resistant     Trimethoprim + Sulfamethoxazole >2/38 ug/ml Resistant                          Imaging Results (last 24 hours)     Procedure Component Value Units Date/Time    FL C Arm During Surgery [[id number]] Collected:  10/18/18 0839     Updated:  10/18/18 0853    Narrative:       EXAMINATION: FL C ARM DURING SURGERY- 10/17/2018     INDICATION: Hemodialysis Catheter Insertion; N39.0-Urinary tract  infection, site not specified;  Z86.19-Personal history of other  infectious and parasitic diseases; N17.9-Acute kidney failure,  unspecified; N18.9-Chronic kidney disease, unspecified; D63.8-Anemia in  other chronic diseases classified elsewhere; Z74.09-Other reduced  mobility       TECHNIQUE: Intraoperative fluoroscopy for improved localization and  treatment planning      COMPARISON: Chest x-ray 10/14/2018     FINDINGS: Intraoperative fluoroscopy with total fluoroscopic time usage  21 seconds and 4 representative images saved during right-sided internal  jugular approach tunneled dialysis catheter placement.       Impression:       Intraoperative fluoroscopy utilized during right-sided  tunneled dialysis catheter placement.     D:  10/18/2018  E:  10/18/2018         This report was finalized on 10/18/2018 8:51 AM by Dr. Ashu Romero.       XR Chest 1 View [[id number]] Collected:  10/17/18 1948     Updated:  10/17/18 2141    Narrative:       EXAM:    XR Chest, 1 View     EXAM DATE/TIME:    10/17/2018 7:48 PM     CLINICAL HISTORY:    68 years old, female; Anemia in other chronic diseases classified elsewhere;   Acute kidney failure, unspecified; Chronic kidney disease, unspecified; Urinary   tract infection, site Not specified; Other reduced mobility; Other reduced   mobility; Personal history of other infectious and parasitic diseases; Device   placement; Other: Rij line placement     TECHNIQUE:    XR of the chest, 1 view.     COMPARISON:    CR XR CHEST 2 VW 10/14/2018 2:24 PM. CT Chest 11/04/2017, images requested for   retrieval from archive for comparison, initially off line. Report available.    FINDINGS:    Tubes, catheters and devices:  Right jugular double-lumen catheter placed with   distal tip near SVC-RA junction. Impression    Lungs:  Small bilateral suprahilar nodular densities or pulmonary vessels seen   on end, present on the left on prior study, but now apparent on right since   10/14/2018.  CT Chest 11/04/2017 no obvious nodules  there.  If desired,   followup CT could rule out true nodularity versus prominence of pulmonary   vessels. Mildly increased interstitial markings of lungs may represent mild   interstitial edema.    Pleural space:  Mild biapical pleural thickening/scarring. No significant   pleural effusion. No pneumothorax.    Heart/Mediastinum:  Mild cardiomegaly. No acute mediastinal abnormality   compared to prior study.    Bones/joints:  Degenerative changes of spine. Mild scoliosis.    Other findings:  Larger body habitus obscure some resolution.       Impression:       1. Small bilateral superhilar nodularity versus slightly prominent pulmonary   vessels. See above.   2. Mild bilateral interstitial markings, possibly edema.   3. Mild cardiomegaly.     THIS DOCUMENT HAS BEEN ELECTRONICALLY SIGNED BY MAYO Crowe for orders placed during the hospital encounter of 09/02/17   Adult Transthoracic Echo Complete    Narrative · Left ventricular wall thickness is consistent with mild concentric   hypertrophy.  · Left atrial cavity size is mildly dilated.  · Mild mitral valve regurgitation is present  · calcification of the aortic valve  · Mild tricuspid valve regurgitation is present.          I have reviewed the medications.      amLODIPine 10 mg Oral Daily   atorvastatin 10 mg Oral Nightly   budesonide-formoterol 2 puff Inhalation BID - RT   carvedilol 25 mg Oral BID With Meals   epoetin porter 20,000 Units Subcutaneous Once per day on Mon Wed Fri   ertapenem 500 mg Intravenous Q24H   heparin (porcine) 5,000 Units Subcutaneous Q12H   hydrALAZINE 100 mg Oral TID   insulin lispro 0-7 Units Subcutaneous 4x Daily With Meals & Nightly   isosorbide mononitrate 30 mg Oral Daily   pantoprazole 40 mg Oral Q AM   Patiromer Sorbitex Calcium 16.8 g Oral Once   polyethylene glycol 17 g Oral Daily   saccharomyces boulardii 250 mg Oral BID   sertraline 50 mg Oral Daily   sodium bicarbonate 1,300 mg Oral BID   sodium chloride 3  mL Intravenous Q12H         Assessment/Plan   Assessment / Plan     Active Hospital Problems    Diagnosis   • **Severe sepsis (CMS/[facility])   • Hyperkalemia   • Metabolic encephalopathy   • Anemia of chronic renal failure, stage 5 (CMS/[facility])   • Diabetes mellitus, type II (CMS/[facility])   • Chronic heart failure (CMS/[facility])   • UTI (urinary tract infection)   • Leukocytosis   • Hypertension   • ESRD (end stage renal disease) (CMS/[facility])     Renal Biopsy 08/10/17: Diabetic neuropathy, mild to moderate atherosclerosis           Brief Hospital Course to date:  Joy Bueno is a 68 y.o. female past medical history of type 2 diabetes, significant stage IV, chronic heart failure, hypertension.  Presents to [facility] performed, which place with complaints for 5 days of lethargy, decreased by mouth intake, difficult to arouse.  Here patient has been admitted with sepsis and acute metabolic encephalopathy likely secondary to UTI.     Assessment & Plan:  - Severe sepsis likely secondary to ESBL UTI improving, continue IVF, f/u blood cultures currently on Invanz, ID following  - Acute encephalopathy resolving, likely multifactorial sec to uremia vs sepsis, improving  - ESRD likely sec to hypertensive nephropathy she is s/p tunneled cath placement 10/17, renal following starting HD today, needs AVF creation.  - Suspected decompensated HF with elevated BNP and hypoxia, continue O2, diuresis will be limited with her renal failure, will await renal consult  - Previously well controlled T2DM AC1 6.8% (8/3), repeat is 5.4% suspect these are falsely low sec to multiple blood transfusions, continue ssi  - Anemia stable, no si/sx of acute blood loss, suspect ACD, continue to monitor, s/p 3 unit PRBC transfuse, continue to monitor  - Repeat labs in AM, cbc, bmp     DVT Prophylaxis:  [facility]    CODE STATUS:   Code Status and Medical Interventions:   Ordered at: 10/14/18 1942     Level Of Support Discussed With:    Patient     Code Status:    CPR     Medical  Interventions (Level of Support Prior to Arrest):    Full       Disposition: TBD    Electronically signed by Paula Silver MD, 10/18/18, 9:32 AM.

## 2018-10-18 NOTE — PLAN OF CARE
Problem: Skin Injury Risk (Adult)  Goal: Identify Related Risk Factors and Signs and Symptoms  Outcome: Ongoing (interventions implemented as appropriate)  Pt. Complains of pain in RUE, elevated and added heat pad to arm to help with comfort.

## 2018-10-18 NOTE — DISCHARGE PLACEMENT REQUEST
"Pam Loyd  (68 y.o. Female)     Date of Birth Social Security Number Address Home Phone MRN    1950  [address]  [address]  [place] [address] [phone] [id number]    Sikh Marital Status          None        Admission Date Admission Type Admitting Provider Attending Provider Department, Room/Bed    10/14/18 Emergency Paula Silver MD Opii, [place], MD [facility] 2F, S204/1    Discharge Date Discharge Disposition Discharge Destination                       Attending Provider:  Paula Silver MD    Allergies:  Geodon [Ziprasidone Hcl], Haldol [Haloperidol Lactate], Seroquel [Quetiapine Fumarate]    Isolation:  Contact   Infection:  ESBL Klebsiella (08/05/18)   Code Status:  CPR    Ht:  165.1 cm (65\")   Wt:  136 kg (300 lb)    Admission Cmt:  None   Principal Problem:  Severe sepsis (CMS/[facility]) [A41.9,R65.20]                 Active Insurance as of 10/14/2018     Primary Coverage     Payor Plan Insurance Group Employer/Plan Group    MEDICARE MEDICARE A & B      Payor Plan Address Payor Plan Phone Number Effective From Effective To    [address] [phone] 5/1/2016     [facility] [id number]       Subscriber Name Subscriber Birth Date Member ID       PAM LOYD 1950 [id number]           Secondary Coverage     Payor Plan Insurance Group Employer/Plan Group    [place]      Payor Plan Address Payor Plan Phone Number Effective From Effective To    [address] [phone] 8/2/2018     [facility] [id number]       Subscriber Name Subscriber Birth Date Member ID       PAM LOYD 1950 [id number]                 Emergency Contacts      (Rel.) Home Phone Work Phone Mobile Phone    Tessie Dorado (Daughter) [phone] --               History & Physical      Matthew Ackerman MD at 10/14/2018  5:25 PM              [facility] Medicine Services  HISTORY AND " PHYSICAL    Patient Name: Joy Bueno  : 1950  MRN: [id number]  Primary Care Physician: Provider, No Known  Date of admission: 10/14/2018      Subjective   Subjective     Chief Complaint:  Lethargy    HPI:  Joy Bueno is a 68 y.o. female resident of [facility] with PMH of CK D, DM, HTN, DHF, and anemia who was brought to ED by EMS for lethargy.  SNF staff indicate that the patient has had progressive worsening of confusion and become more lethargic over the last 5 days at the facility with decreased oral intake, and becoming very hard to arouse today.  Patient was recently hospitalized from  here at [facility] for anemia and ESBL UTI for which patient was treated with Invanz and received a blood transfusion, Procrit and started on oral iron.  In ED found to have acute cystitis with positive urinalysis and patient was started on IV Invanz.  Chest x-ray unremarkable, and CT head with no acute intracranial abnormalities.  Will be admitted hospital medicine for further evaluation and treatment.    Review of Systems   Unable to assess due to AMS    Otherwise 10-system ROS reviewed and is negative except as mentioned in the HPI.    Personal History     Past Medical History:   Diagnosis Date   • Anemia    • Anxiety    • Asthma    • Chronic kidney disease    • Diabetes mellitus (CMS/[facility])    • Diabetes mellitus, type II (CMS/HCC) 8/3/2018   • History of Clostridium difficile infection 8/3/2018   • History of respiratory failure, since off home oxygen 8/3/2018   • History of UTI 8/3/2018   • Hypertension    • Morbid obesity (CMS/[facility])    • Osteoarthritis    • Tinea capitis 8/3/2018       Past Surgical History:   Procedure Laterality Date   •  SECTION     • COLONOSCOPY N/A 2017    Procedure: COLONOSCOPY;  Surgeon: Mark I Brunner, MD;  [facility];  Service:    • ENDOSCOPY N/A 2017    Procedure: ESOPHAGOGASTRODUODENOSCOPY ;  Surgeon: Velasquez Call MD;   Location: [facility];  Service:    • HERNIA REPAIR         Family History: family history includes Cardiomyopathy in her mother; Diabetes in her father; Stroke in her father.     Social History:  reports that she has quit smoking. She smoked 0.25 packs per day. She does not have any smokeless tobacco history on file. She reports that she does not drink alcohol or use drugs.  Social History     Social History Narrative    Mrs. Bueno is a 68 year old AA  female. She lives alone in [place] but has been in rehab for some time. She has a daughter. She does not have an advanced directive.       Medications:  Available home medication information reviewed     Allergies   Allergen Reactions   • Geodon [Ziprasidone Hcl] Unknown (See Comments)     PT DOESN'T REMEMBER   • Haldol [Haloperidol Lactate] Unknown (See Comments)     PT DOESN'T REMEMBER   • Seroquel [Quetiapine Fumarate] Unknown (See Comments)     PT DOESN'T REMEMBER       Objective   Objective     Vital Signs:   Temp:  [97.4 °F (36.3 °C)-98.2 °F (36.8 °C)] 98.1 °F (36.7 °C)  Heart Rate:  [57-77] 69  Resp:  [17-20] 17  BP: (136-164)/(55-92) 164/63        Physical Exam   Constitutional: No acute distress, lethargic / dozing off during conversation, having to stimulate constantly to get verbal response/ drooling on self; morbidly obese  Eyes: PERRLA, sclerae anicteric, no conjunctival injection  HENT: NCAT, mucous membranes moist  Neck: Supple, no thyromegaly, no lymphadenopathy, trachea midline  Respiratory: Clear to auscultation bilaterally, nonlabored respirations   Cardiovascular: RRR, no murmurs, rubs, or gallops, palpable pedal pulses bilaterally  Gastrointestinal: Positive bowel sounds, soft, nontender, nondistended; obese  Musculoskeletal: No bilateral ankle edema, no clubbing or cyanosis to extremities  Psychiatric: cooperative when awake   Neurologic: Oriented x 3, SUTTON, Cranial Nerves grossly intact to confrontation, speech clear  Skin: No  ajith     Results Reviewed:  I have personally reviewed current lab, radiology, and data and agree.      Results from last 7 days  Lab Units 10/14/18  1329   WBC 10*3/mm3 14.20*   HEMOGLOBIN g/dL 7.0*   HEMATOCRIT % 23.1*   PLATELETS 10*3/mm3 238       Results from last 7 days  Lab Units 10/14/18  1329   SODIUM mmol/L 144   POTASSIUM mmol/L 5.9*   CHLORIDE mmol/L 119*   CO2 mmol/L 18.0*   BUN mg/dL 73*   CREATININE mg/dL 4.29*   GLUCOSE mg/dL 96   CALCIUM mg/dL 8.7   ALT (SGPT) U/L 14   AST (SGOT) U/L 15     Estimated Creatinine Clearance: 17.8 mL/min (A) (by C-G formula based on SCr of 4.29 mg/dL (H)).  Brief Urine Lab Results  (Last result in the past 365 days)      Color   Clarity   Blood   Leuk Est   Nitrite   Protein   CREAT   Urine HCG        10/14/18 1340 Yellow Turbid(A) Small (1+)(A) Large (3+)(A) Negative 100 mg/dL (2+)(A)             BNP   Date Value Ref Range Status   10/14/2018 682.0 (H) 0.0 - 100.0 pg/mL Final     Comment:     Results may be falsely decreased if patient taking Biotin.     Imaging Results (last 24 hours)     Procedure Component Value Units Date/Time    XR Chest 2 View [[id number]] Collected:  10/14/18 1443     Updated:  10/14/18 1653    Narrative:          EXAMINATION: XR CHEST 2 VW - 10/14/2018     INDICATION: Lethargy.      COMPARISON: None.     FINDINGS: Two-view chest reveals the heart to be enlarged. Underlying  chronic changes seen throughout the lung fields bilaterally. No evidence  of acute parenchymal disease. Pulmonary vascularity is pronounced. No  focal parenchymal opacification present. Degenerative change is seen  within the spine.           Impression:       Low lung volumes with chronic changes seen within the lung  fields, enlargement heart and no evidence of acute parenchymal disease.     DICTATED:   10/14/2018  EDITED/ls :   10/14/2018      This report was finalized on 10/14/2018 4:51 PM by Dr. Rosalie Kline MD.       CT Head Without Contrast [[id number]]  Collected:  10/14/18 1441     Updated:  10/14/18 1652    Narrative:       EXAMINATION: CT HEAD WO CONTRAST - 10/12/2018     INDICATION: Lethargy, weakness.     TECHNIQUE: Multiple axial CT imaging is obtained of the head from skull  base to skull vertex without the administration of intravenous contrast.     The radiation dose reduction device was turned on for each scan per the  ALARA (As Low as Reasonably Achievable) protocol.     COMPARISON: None.     FINDINGS: The parenchyma is grossly unremarkable. Some low-density area  is seen in the periventricular and subcortical white matter suggesting  chronic small vessel ischemic change. There is no hemorrhage or  hydrocephalus. No mass, mass effect or midline shift. Bony structures  reveal no evidence of osseous abnormality. There is mucosal thickening  of the left maxillary sinus and ethmoid air cells. Globes and orbits are  intact. The mastoid air cells are patent.       Impression:       No acute intracranial abnormality with chronic changes seen  within the brain.     DICTATED:   10/14/2018  EDITED/ls :   10/14/2018      This report was finalized on 10/14/2018 4:50 PM by Dr. Rosalie Kline MD.           Results for orders placed during the hospital encounter of 09/02/17   Adult Transthoracic Echo Complete    Narrative · Left ventricular wall thickness is consistent with mild concentric   hypertrophy.  · Left atrial cavity size is mildly dilated.  · Mild mitral valve regurgitation is present  · calcification of the aortic valve  · Mild tricuspid valve regurgitation is present.          Assessment/Plan   Assessment / Plan     Active Hospital Problems    Diagnosis   • Hyperkalemia   • Metabolic encephalopathy   • Anemia of chronic renal failure, stage 5 (CMS/HCC)   • Diabetes mellitus, type II (CMS/HCC)   • CKD (chronic kidney disease) stage 4, GFR 15-29 ml/min (CMS/HCC)   • Chronic heart failure (CMS/HCC)   • UTI (urinary tract infection)   • Leukocytosis   •  Hypertension         Assessment & Plan:  SEVERE SEPSIS FROM UTI WITH METABOLIC & RESP ACIDOSIS, ENCEPHALOPATHY AND NATALIO ON CKD, AS WELL AS WORSENING CHRONIC ANEMIA AND POSS MILD DECOMPENSATED DHF. GIVEN ADVANCED AGE AND COMPLEX MEDICAL HISTORY, PT IS AT RISK FOR COMPLICATIONS. SHE HAS HISTORY OF PRIOR C DIFF INFECTION AND ESBL UTI. SHE HAS NO DIARRHEA AT THIS TIME. SHE'S BEEN STARTED ON INVANCE IN THE ED AND CULTURES ARE IN PROGRESS. WILL CONT WITH CURRENT ABX TO COVER FOR ESBL. WILL TRANSFUSE 1 UNIT PRBC AND DIURESE AFTER. CHECK FOBT AND ANEMIA W/U AGAIN, PRIOR EGD AND C'SCOPE NEG. CONSULT NEPHROLOGY AND ID IN AM, POSS PROGRESSION TO ESRD. NEBS AND OXYGEN PRN. PT NONCOMPLIANT WITH CPAP. MILD HYPERKALEMIA WITH NO SIGNIFICANT EKG CHANGES, HOPEFULLY RESOLVE WITH ABX/BLOOD. WILL MONITOR. PT/OT/CM TO SEE FOR DC PLANNING/CHRONIC PHYSICAL DECONDITIONING.    Lethargy  --Likely could be multifactorial, given patient's chronic renal failure with acute renal insufficiency, possible undiagnosed sleep apnea, probable UTI  --CT head unremarkable, chest x-ray unremarkable  --Will obtain ABG, with visible signs of apnea on assessment we'll order CPAP      UTI  --Recent history of ESBL Klebsiella UTI in August 2018 treated with Invanz  --Continue Invanz at this time and culture urine    AK I on CK D  --Likely due to dehydration, nephrotoxic meds  --Will treat with gentle IV fluid hydration and monitor renal function  --Nephrology consultation in a.m.  --Labs in a.m.    Leukocytosis  --Likely due to UTI  --Continue to monitor Will obtain CBC in a.m.    Hyperkalemia  --Likely due to renal insufficiency and potassium supplementation daily  --hold daily potassium supplements and repeat BMP in a.m. to reevaluate potassium    DHF  -- Stable, continue medical management    Hypertension  --Continue home meds  --Hold nephrotoxic meds    Diabetes  --Hold oral diabetic medications will start on low-dose sliding scale insulin and adjust as  needed  --Accu-Cheks     Anemia  --H&H appears stable at 7.0/ 23.1  --Has decreased from 8.0 and 25.3 since previous transfusion in August 2018 without any overt signs or symptoms of blood loss  --Previous workup was unrevealing as anemia was still of unknown etiology possibly likely due to chronic disease  --Continue iron supplement      DVT prophylaxis: Hep    CODE STATUS:    Code Status and Medical Interventions:   Ordered at: 10/14/18 1942     Level Of Support Discussed With:    Patient     Code Status:    CPR     Medical Interventions (Level of Support Prior to Arrest):    Full       Admission Status:  I believe this patient meets INPATIENT status due to the need for care which can only be reasonably provided in an hospital setting such as aggressive/expedited ancillary services and/or consultation services, the necessity for IV medications, close physician monitoring and/or the possible need for procedures.  In such, I feel patient’s risk for adverse outcomes and need for care warrant INPATIENT evaluation and predict the patient’s care encounter to likely last beyond 2 midnights.         Electronically signed by SUELLEN Reyes, 10/14/18, 5:25 PM.        Brief Attending Admission Attestation     I have seen and examined the patient, performing an independent face-to-face diagnostic evaluation with plan of care reviewed and developed with the advanced practice clinician (APC).      Brief Summary Statement/HPI:   Joy Bueno is a 68 y.o. female who resides at [facility], brought in by EMS for evaluation of AMS/somnolence, which had been progressive over the last several days. She has complex history that includes morbid obesity, OAS (noncompliant with CPAP), CKD4, DHF, DM2, HTN, asthma, chronic anemia-likely from chronic disease, prior ESBL Klebsiella UTI, and remote C diff infection. Pt was very lethargic on arrival, but by the time I saw her, she was alert and eating. She reports no hematochezia  or melena. No diarrhea. No dysuria. No fever or chills. Family at bedside.    W/U in ED revealed worsening anemia, NATALIO on CKD4, UTI, and hyperkalemia. BNP was also elevated. ABG revealed signs of metabolic and resp acidosis.      Attending Physical Exam:  General Assessment: No acute cardiopulmonary distress. Well developed and well nourished. Mildly toxic.    HEENT: NCAT, PERRL, MM moist    Neck: Supple    CVS: RRR, S1S2 normal    Resp: Decreased BS bilat, + coarse crackles bilat, no wheezing, resp non-labored.    Abd: soft, NT, ND, normal BS, no guarding or peritoneal signs    Ext: No edema, both calves are symmetric and NTTP    Neuro: Alert, face symmetric, speech clear, SUTTON    Skin: W/D/I. No rash.    Psych: Affect is appropriate      Brief Assessment/Plan :  See above for further detailed assessment and plan developed with APC which I have reviewed and/or edited.      Electronically signed by Matthew Ackerman MD, 10/14/18, 7:42 PM.           Electronically signed by Matthew Ackerman MD at 10/14/2018  8:12 PM       Prior to Admission Medications     Prescriptions Last Dose Informant Patient Reported? Taking?    acetaminophen (TYLENOL) 325 MG tablet  Medication Bottle Yes Yes    Take 650 mg by mouth Every 4 (Four) Hours As Needed for Mild Pain (1-3).    albuterol (PROVENTIL HFA;VENTOLIN HFA) 108 (90 BASE) MCG/ACT inhaler   Yes Yes    Inhale 2 puffs Every 4 (Four) Hours As Needed for Wheezing.    amLODIPine (NORVASC) 10 MG tablet   Yes Yes    Take 10 mg by mouth Daily.    atorvastatin (LIPITOR) 10 MG tablet  Medication Bottle Yes Yes    Take 10 mg by mouth Every Night.    calcitriol (ROCALTROL) 0.25 MCG capsule   Yes Yes    Take 0.25 mcg by mouth Daily.    carvedilol (COREG) 25 MG tablet 10/13/2018  Yes Yes    Take 25 mg by mouth 2 (Two) Times a Day With Meals.    Cholecalciferol (VITAMIN D3) 11062 units tablet   Yes Yes    Take 50,000 Units by mouth Every 7 (Seven) Days.    ferrous sulfate 325 (65  FE) MG tablet   Yes Yes    Take 325 mg by mouth Daily.    furosemide (LASIX) 40 MG tablet   No Yes    Take 1 tablet by mouth Daily As Needed (edema/weight gain).    glipiZIDE (GLUCOTROL XL) 5 MG ER tablet   Yes Yes    Take 5 mg by mouth Daily.    hydrALAZINE (APRESOLINE) 100 MG tablet   No Yes    Take 1 tablet by mouth 3 (Three) Times a Day.    isosorbide mononitrate (IMDUR) 30 MG 24 hr tablet   Yes Yes    Take 30 mg by mouth Daily.    loratadine (CLARITIN) 10 MG tablet   Yes Yes    Take 10 mg by mouth Daily.    melatonin 1 MG tablet   Yes Yes    Take 3 mg by mouth Every Night.    mometasone-formoterol (DULERA 200) 200-5 MCG/ACT inhaler  Medication Bottle Yes Yes    Inhale 2 puffs 2 (Two) Times a Day.    Multiple Vitamins-Minerals (MULTIVITAMIN ADULTS PO)  Medication Bottle Yes Yes    Take 1 tablet by mouth Daily.    NITROGLYCERIN SL   Yes Yes    Place 0.4 mg under the tongue. PRN CHEST PAIN    pantoprazole (PROTONIX) 40 MG EC tablet   Yes Yes    Take 40 mg by mouth Daily.    polyethylene glycol (MIRALAX) packet   Yes Yes    Take 17 g by mouth Daily.    potassium chloride (K-DUR,KLOR-CON) 20 MEQ CR tablet   Yes Yes    Take 20 mEq by mouth Daily.    promethazine (PHENERGAN) 25 MG tablet   Yes Yes    Take 25 mg by mouth Every 6 (Six) Hours As Needed for Nausea or Vomiting.    risperiDONE (risperDAL) 0.25 MG tablet   Yes Yes    Take 0.125 mg by mouth every night at bedtime.    saccharomyces boulardii (FLORASTOR) 250 MG capsule   No Yes    Take 1 capsule by mouth 2 (Two) Times a Day.    sertraline (ZOLOFT) 50 MG tablet   Yes Yes    Take 50 mg by mouth Daily.    simethicone (MYLICON) 80 MG chewable tablet  Medication Bottle Yes Yes    Chew 80 mg Every 6 (Six) Hours As Needed for Flatulence.    traZODone (DESYREL) 25 MG half tablet   Yes Yes    Take 25 mg by mouth At Night As Needed for Sleep.    vitamin C (ASCORBIC ACID) 250 MG tablet   Yes Yes    Take 250 mg by mouth Daily.    castor oil-balsam peru (VENELEX) ointment    No No    Apply 1 application topically Every 12 (Twelve) Hours.    miconazole (MICOTIN) 2 % powder   No No    Apply  topically Every 12 (Twelve) Hours.            Lab Results (last 24 hours)     Procedure Component Value Units Date/Time    POC Glucose Once [[id number]]  (Normal) Collected:  10/18/18 1132    Specimen:  Blood Updated:  10/18/18 1145     Glucose 121 mg/dL     Narrative:       Meter: [id number] : [id number] Adriano Koroma    POC Glucose Once [[id number]]  (Normal) Collected:  10/18/18 0735    Specimen:  Blood Updated:  10/18/18 0737     Glucose 107 mg/dL     Narrative:       Meter: [id number] : [id number] Javier Nogueramariana    Hepatic Function Panel [[id number]]  (Normal) Collected:  10/18/18 0638    Specimen:  Blood Updated:  10/18/18 0720     Total Protein 6.2 g/dL      Albumin 3.20 g/dL      ALT (SGPT) 13 U/L      AST (SGOT) 12 U/L      Alkaline Phosphatase 81 U/L      Total Bilirubin 0.3 mg/dL      Bilirubin, Direct 0.1 mg/dL      Bilirubin, Indirect 0.2 mg/dL     Phosphorus [[id number]]  (Abnormal) Collected:  10/18/18 0638    Specimen:  Blood Updated:  10/18/18 0719     Phosphorus 5.8 (H) mg/dL     Basic Metabolic Panel [[id number]]  (Abnormal) Collected:  10/18/18 0638    Specimen:  Blood Updated:  10/18/18 0719     Glucose 110 (H) mg/dL      BUN 78 (H) mg/dL      Creatinine 4.50 (H) mg/dL      Sodium 142 mmol/L      Potassium 5.0 mmol/L      Chloride 113 (H) mmol/L      CO2 20.0 mmol/L      Calcium 8.7 mg/dL      eGFR  African Amer 12 (L) mL/min/1.73      BUN/Creatinine Ratio 17.3     Anion Gap 9.0 mmol/L     Narrative:       National Kid[employer] Guidelines    Stage     Description        GFR  1         Normal or High     90+  2         Mild decrease      60-89  3         Moderate decrease  30-59  4         Severe decrease    15-29  5         Kidney failure     <15    The MDRD GFR formula is only valid for adults with stable renal function between ages 18 and 70.    POC Glucose Once  [[id number]]  (Abnormal) Collected:  10/17/18 2114    Specimen:  Blood Updated:  10/17/18 2126     Glucose 138 (H) mg/dL     Narrative:       Meter: [id number] : [id number] Vickie Person    Blood Culture - Blood, [[id number]]  (Normal) Collected:  10/14/18 1606    Specimen:  Blood from Arm, Left Updated:  10/17/18 1630     Blood Culture No growth at 3 days    Blood Culture - Blood, [[id number]]  (Normal) Collected:  10/14/18 1606    Specimen:  Blood from Arm, Right Updated:  10/17/18 1630     Blood Culture No growth at 3 days           Physician Progress Notes (last 72 hours) (Notes from 10/15/2018  4:28 PM through 10/18/2018  4:28 PM)      Paula Silver MD at 10/18/2018  9:32 AM              [facility] Medicine Services  PROGRESS NOTE    Patient Name: Joy Bueno  : 1950  MRN: [id number]    Date of Admission: 10/14/2018  Length of Stay: 4  Primary Care Physician: Provider, No Known    Subjective   Subjective     CC:sepsis    HPI:No acute events overnight, patient is s/p HD this morning, feeling much better.    Review of Systems  Gen- No fevers, chills  CV- No chest pain, palpitations  Resp- No cough, dyspnea  GI- No N/V/D, abd pain    Otherwise ROS is negative except as mentioned in the HPI.    Objective   Objective     Vital Signs:   Temp:  [96.7 °F (35.9 °C)-98.7 °F (37.1 °C)] 97.4 °F (36.3 °C)  Heart Rate:  [59-78] 78  Resp:  [14-20] 20  BP: (114-199)/(55-87) 199/87       Physical Exam:  Constitutional: chronically ill female, in no acute distress, awake, alert  HENT: NCAT, mucous membranes moist  Respiratory: non labored respirations, no wheezes or rhonchi   Cardiovascular: RRR, no murmurs, rubs, or gallops, palpable pedal pulses bilaterally  Gastrointestinal: Obese, positive bowel sounds, soft, nontender, nondistended  Musculoskeletal: No bilateral ankle edema  Psychiatric: Appropriate affect, cooperative  Neurologic: Oriented x 3, no focal deficits  Skin: No  ajith    Results Reviewed:  I have personally reviewed current lab, radiology, and data and agree.      Results from last 7 days  Lab Units 10/17/18  0543 10/16/18  1936 10/16/18  0802 10/15/18  0633 10/14/18  1329   WBC 10*3/mm3  --   --  12.46* 11.79* 14.20*   HEMOGLOBIN g/dL 9.0* 6.8* 6.9* 5.1* 7.0*   HEMATOCRIT % 29.0* 22.9* 22.8* 23.8* 23.1*   PLATELETS 10*3/mm3  --   --  210 194 238       Results from last 7 days  Lab Units 10/18/18  0638 10/16/18  0802 10/15/18  0625 10/14/18  1329   SODIUM mmol/L 142 143 144 144   POTASSIUM mmol/L 5.0 5.6* 5.8* 5.9*   CHLORIDE mmol/L 113* 115* 115* 119*   CO2 mmol/L 20.0 20.0 20.0 18.0*   BUN mg/dL 78* 78* 77* 73*   CREATININE mg/dL 4.50* 4.42* 4.33* 4.29*   GLUCOSE mg/dL 110* 147* 82 96   CALCIUM mg/dL 8.7 8.2* 8.2* 8.7   ALT (SGPT) U/L 13  --   --  14   AST (SGOT) U/L 12  --   --  15     Estimated Creatinine Clearance: 17 mL/min (A) (by C-G formula based on SCr of 4.5 mg/dL (H)).  No results found for: BNP    Microbiology Results Abnormal     Procedure Component Value - Date/Time    Blood Culture - Blood, [[id number]]  (Normal) Collected:  10/14/18 1606    Lab Status:  Preliminary result Specimen:  Blood from Arm, Left Updated:  10/17/18 1630     Blood Culture No growth at 3 days    Blood Culture - Blood, [[id number]]  (Normal) Collected:  10/14/18 1606    Lab Status:  Preliminary result Specimen:  Blood from Arm, Right Updated:  10/17/18 1630     Blood Culture No growth at 3 days    Urine Culture - Urine, [[id number]]  (Abnormal)  (Susceptibility) Collected:  10/14/18 1340    Lab Status:  Final result Specimen:  Urine from Urine, Catheter Updated:  10/16/18 1400     Urine Culture >100,000 CFU/mL Klebsiella pneumoniae ESBL (C)     Comment:   Consider infectious disease consult.  Susceptibility results may not correlate to clinical outcomes.       Susceptibility      Klebsiella pneumoniae ESBL     AL     Ciprofloxacin >2 ug/ml Resistant     Ertapenem <=1 ug/ml Susceptible      Gentamicin <=4 ug/ml Susceptible     Levofloxacin >4 ug/ml Resistant     Meropenem <=1 ug/ml Susceptible     Nitrofurantoin 64 ug/ml Intermediate     Piperacillin + Tazobactam <=16 ug/ml Susceptible     Tetracycline >8 ug/ml Resistant     Tobramycin >8 ug/ml Resistant     Trimethoprim + Sulfamethoxazole >2/38 ug/ml Resistant                          Imaging Results (last 24 hours)     Procedure Component Value Units Date/Time    FL C Arm During Surgery [[id number]] Collected:  10/18/18 0839     Updated:  10/18/18 0853    Narrative:       EXAMINATION: FL C ARM DURING SURGERY- 10/17/2018     INDICATION: Hemodialysis Catheter Insertion; N39.0-Urinary tract  infection, site not specified; Z86.19-Personal history of other  infectious and parasitic diseases; N17.9-Acute kidney failure,  unspecified; N18.9-Chronic kidney disease, unspecified; D63.8-Anemia in  other chronic diseases classified elsewhere; Z74.09-Other reduced  mobility       TECHNIQUE: Intraoperative fluoroscopy for improved localization and  treatment planning      COMPARISON: Chest x-ray 10/14/2018     FINDINGS: Intraoperative fluoroscopy with total fluoroscopic time usage  21 seconds and 4 representative images saved during right-sided internal  jugular approach tunneled dialysis catheter placement.       Impression:       Intraoperative fluoroscopy utilized during right-sided  tunneled dialysis catheter placement.     D:  10/18/2018  E:  10/18/2018         This report was finalized on 10/18/2018 8:51 AM by Dr. Ashu Romero.       XR Chest 1 View [[id number]] Collected:  10/17/18 1948     Updated:  10/17/18 2141    Narrative:       EXAM:    XR Chest, 1 View     EXAM DATE/TIME:    10/17/2018 7:48 PM     CLINICAL HISTORY:    68 years old, female; Anemia in other chronic diseases classified elsewhere;   Acute kidney failure, unspecified; Chronic kidney disease, unspecified; Urinary   tract infection, site Not specified; Other reduced mobility; Other reduced    mobility; Personal history of other infectious and parasitic diseases; Device   placement; Other: Rij line placement     TECHNIQUE:    XR of the chest, 1 view.     COMPARISON:    CR XR CHEST 2 VW 10/14/2018 2:24 PM. CT Chest 11/04/2017, images requested for   retrieval from archive for comparison, initially off line. Report available.    FINDINGS:    Tubes, catheters and devices:  Right jugular double-lumen catheter placed with   distal tip near SVC-RA junction. Impression    Lungs:  Small bilateral suprahilar nodular densities or pulmonary vessels seen   on end, present on the left on prior study, but now apparent on right since   10/14/2018.  CT Chest 11/04/2017 no obvious nodules there.  If desired,   followup CT could rule out true nodularity versus prominence of pulmonary   vessels. Mildly increased interstitial markings of lungs may represent mild   interstitial edema.    Pleural space:  Mild biapical pleural thickening/scarring. No significant   pleural effusion. No pneumothorax.    Heart/Mediastinum:  Mild cardiomegaly. No acute mediastinal abnormality   compared to prior study.    Bones/joints:  Degenerative changes of spine. Mild scoliosis.    Other findings:  Larger body habitus obscure some resolution.       Impression:       1. Small bilateral superhilar nodularity versus slightly prominent pulmonary   vessels. See above.   2. Mild bilateral interstitial markings, possibly edema.   3. Mild cardiomegaly.     THIS DOCUMENT HAS BEEN ELECTRONICALLY SIGNED BY MAYO ELDER MD        Results for orders placed during the hospital encounter of 09/02/17   Adult Transthoracic Echo Complete    Narrative · Left ventricular wall thickness is consistent with mild concentric   hypertrophy.  · Left atrial cavity size is mildly dilated.  · Mild mitral valve regurgitation is present  · calcification of the aortic valve  · Mild tricuspid valve regurgitation is present.          I have reviewed the  medications.      amLODIPine 10 mg Oral Daily   atorvastatin 10 mg Oral Nightly   budesonide-formoterol 2 puff Inhalation BID - RT   carvedilol 25 mg Oral BID With Meals   epoetin porter 20,000 Units Subcutaneous Once per day on Mon Wed Fri   ertapenem 500 mg Intravenous Q24H   heparin (porcine) 5,000 Units Subcutaneous Q12H   hydrALAZINE 100 mg Oral TID   insulin lispro 0-7 Units Subcutaneous 4x Daily With Meals & Nightly   isosorbide mononitrate 30 mg Oral Daily   pantoprazole 40 mg Oral Q AM   Patiromer Sorbitex Calcium 16.8 g Oral Once   polyethylene glycol 17 g Oral Daily   saccharomyces boulardii 250 mg Oral BID   sertraline 50 mg Oral Daily   sodium bicarbonate 1,300 mg Oral BID   sodium chloride 3 mL Intravenous Q12H         Assessment/Plan   Assessment / Plan     Active Hospital Problems    Diagnosis   • **Severe sepsis (CMS/[facility])   • Hyperkalemia   • Metabolic encephalopathy   • Anemia of chronic renal failure, stage 5 (CMS/[facility])   • Diabetes mellitus, type II (CMS/[facility])   • Chronic heart failure (CMS/[facility])   • UTI (urinary tract infection)   • Leukocytosis   • Hypertension   • ESRD (end stage renal disease) (CMS/[facility])     Renal Biopsy 08/10/17: Diabetic neuropathy, mild to moderate atherosclerosis           Brief Hospital Course to date:  Joy Bueno is a 68 y.o. female past medical history of type 2 diabetes, significant stage IV, chronic heart failure, hypertension.  Presents to [facility], which [facility] with complaints for 5 days of lethargy, decreased by mouth intake, difficult to arouse.  Here patient has been admitted with sepsis and acute metabolic encephalopathy likely secondary to UTI.     Assessment & Plan:  - Severe sepsis likely secondary to ESBL UTI improving, continue IVF, f/u blood cultures currently on Invanz, ID following  - Acute encephalopathy resolving, likely multifactorial sec to uremia vs sepsis, improving  - ESRD likely sec to hypertensive nephropathy she is s/p tunneled cath  placement 10/17, renal following starting HD today, needs AVF creation.  - Suspected decompensated HF with elevated BNP and hypoxia, continue O2, diuresis will be limited with her renal failure, will await renal consult  - Previously well controlled T2DM AC1 6.8% (8/3), repeat is 5.4% suspect these are falsely low sec to multiple blood transfusions, continue ssi  - Anemia stable, no si/sx of acute blood loss, suspect ACD, continue to monitor, s/p 3 unit PRBC transfuse, continue to monitor  - Repeat labs in AM, cbc, bmp     DVT Prophylaxis:  SQH    CODE STATUS:   Code Status and Medical Interventions:   Ordered at: 10/14/18 1942     Level Of Support Discussed With:    Patient     Code Status:    CPR     Medical Interventions (Level of Support Prior to Arrest):    Full       Disposition: TBD    Electronically signed by Paula Silver MD, 10/18/18, 9:32 AM.      Electronically signed by Paula Silver MD at 10/18/2018  9:33 AM     Irineo Latham MD at 10/18/2018  8:50 AM             LOS: 4 days    Patient Care Team:  Provider, No Known as PCP - General    Reason For Visit:  Diabetic nephropathy biopsy proven, stage V CK D, no requiring dialysis.  Tunnel catheter place 10/17/2018.  Dialysis started 10/18/2018, patient tolerating well  Subjective   Dialysis in progress.        Review of Systems:    Patient denies shortness of breath, chest pain, dysuria, hematuria, nausea, vomiting.        Objective       amLODIPine 10 mg Oral Daily   atorvastatin 10 mg Oral Nightly   budesonide-formoterol 2 puff Inhalation BID - RT   carvedilol 25 mg Oral BID With Meals   epoetin porter 20,000 Units Subcutaneous Once per day on Mon Wed Fri   ertapenem 500 mg Intravenous Q24H   heparin (porcine) 5,000 Units Subcutaneous Q12H   hydrALAZINE 100 mg Oral TID   insulin lispro 0-7 Units Subcutaneous 4x Daily With Meals & Nightly   isosorbide mononitrate 30 mg Oral Daily   pantoprazole 40 mg Oral Q AM   Patiromer Sorbitex Calcium 16.8 g Oral  "Once   polyethylene glycol 17 g Oral Daily   saccharomyces boulardii 250 mg Oral BID   sertraline 50 mg Oral Daily   sodium bicarbonate 1,300 mg Oral BID   sodium chloride 3 mL Intravenous Q12H       lactated ringers 9 mL/hr Last Rate: Stopped (10/17/18 2001)         Vital Signs:  Blood pressure (!) 184/80, pulse 71, temperature 97.4 °F (36.3 °C), resp. rate 20, height 165.1 cm (65\"), weight 136 kg (300 lb), SpO2 99 %.    Flowsheet Rows      First Filed Value   Admission Height  165.1 cm (65\") Documented at 10/14/2018 1215   Admission Weight  136 kg (300 lb) Documented at 10/14/2018 1215          10/17 0701 - 10/18 0700  In: 500 [P.O.:400; I.V.:100]  Out: 300 [Urine:300]    Physical Exam:  General Appearance: Alert, oriented, no obvious distress.  Eyes: PER, EOMI.  Neck: Supple no JVD.  Lungs: Clear auscultation, no rales rhonchi's, equal chest movement, nonlabored.  Heart: No gallop, murmur, rub, RRR.  Abdomen: Soft, nontender, positive bowel sounds, no organomegaly.  Extremities: No edema, no cyanosis.  Neuro: No focal deficit, moving all extremities, alert oriented×3  Neck: Tunnel catheter right side noted.  No sinus infection or bleeding        Labs:    Results from last 7 days  Lab Units 10/17/18  0543 10/16/18  1936 10/16/18  0802 10/15/18  0633 10/14/18  1329   WBC 10*3/mm3  --   --  12.46* 11.79* 14.20*   HEMOGLOBIN g/dL 9.0* 6.8* 6.9* 5.1* 7.0*   HEMATOCRIT % 29.0* 22.9* 22.8* 23.8* 23.1*   PLATELETS 10*3/mm3  --   --  210 194 238       Results from last 7 days  Lab Units 10/18/18  0638 10/16/18  0802 10/15/18  0625 10/14/18  1329   SODIUM mmol/L 142 143 144 144   POTASSIUM mmol/L 5.0 5.6* 5.8* 5.9*   CHLORIDE mmol/L 113* 115* 115* 119*   CO2 mmol/L 20.0 20.0 20.0 18.0*   BUN mg/dL 78* 78* 77* 73*   CREATININE mg/dL 4.50* 4.42* 4.33* 4.29*   CALCIUM mg/dL 8.7 8.2* 8.2* 8.7   PHOSPHORUS mg/dL 5.8* 6.4*  --   --    MAGNESIUM mg/dL  --  1.5 1.3  --    ALBUMIN g/dL 3.20 3.12*  --  3.37       Results from last 7 " days  Lab Units 10/18/18  0638   GLUCOSE mg/dL 110*         Results from last 7 days  Lab Units 10/18/18  0638   ALK PHOS U/L 81   BILIRUBIN mg/dL 0.3   BILIRUBIN DIRECT mg/dL 0.1   ALT (SGPT) U/L 13   AST (SGOT) U/L 12       Results from last 7 days  Lab Units 10/14/18  1851   PH, ARTERIAL pH units 7.186*   PO2 ART mm Hg 127.0*   PCO2, ARTERIAL mm Hg 48.1*   HCO3 ART mmol/L 18.2*         Results from last 7 days  Lab Units 10/14/18  1340   COLOR UA  Yellow   CLARITY UA  Turbid*   PH, URINE  6.0   SPECIFIC GRAVITY, URINE  1.020   GLUCOSE UA  Negative   KETONES UA  Negative   BILIRUBIN UA  Negative   PROTEIN UA  100 mg/dL (2+)*   BLOOD UA  Small (1+)*   LEUKOCYTES UA  Large (3+)*   NITRITE UA  Negative       Estimated Creatinine Clearance: 17 mL/min (A) (by C-G formula based on SCr of 4.5 mg/dL (H)).      Assessment      1.  Diabetic nephropathy biopsy proven: Now end-stage renal disease requiring dialysis.  2.  Hyperkalemia.  Corrected with dialysis.  3.  Anemia of chronic disease.  4.  Diabetes type 2.  5.  UTI: Severe sepsis,.  6.  Metabolic encephalopathy improving.    Plan dialysis again tomorrow.  We will try to get outpatient dialysis.  Home with outpatient dialysis is arranged.           Irineo Latham MD  10/18/18  8:50 AM          Electronically signed by Irineo Latham MD at 10/18/2018  8:54 AM     Carlos Enrique Calderón MD at 10/17/2018  5:31 PM        I have reviewed the chart, my prior clinic notes, appropriate imaging, and labs.  I have again discussed the risks and benefits of tunneled dialysis catheter placement with fluoroscopy and ultrasound with the patient.  All of her questions have been answered.  They understand and wish to proceed with intervention.    Electronically signed by Carlos Enrique Calderón MD at 10/17/2018  5:33 PM     Rocky Nova MD at 10/17/2018  3:26 PM             LOS: 3 days    Patient Care Team:  Provider, No Known as PCP - General    Reason For  "Visit:  F/U ESRD.  Subjective           Review of Systems:    Pulm: No soa   CV:  No CP      Objective       amLODIPine 10 mg Oral Daily   atorvastatin 10 mg Oral Nightly   budesonide-formoterol 2 puff Inhalation BID - RT   carvedilol 25 mg Oral BID With Meals   epoetin porter 20,000 Units Subcutaneous Once per day on Mon Wed Fri   ertapenem 500 mg Intravenous Q24H   heparin (porcine) 5,000 Units Subcutaneous Q12H   hydrALAZINE 100 mg Oral TID   insulin lispro 0-7 Units Subcutaneous 4x Daily With Meals & Nightly   isosorbide mononitrate 30 mg Oral Daily   pantoprazole 40 mg Oral Q AM   Patiromer Sorbitex Calcium 16.8 g Oral Once   polyethylene glycol 17 g Oral Daily   saccharomyces boulardii 250 mg Oral BID   sertraline 50 mg Oral Daily   sodium bicarbonate 1,300 mg Oral BID   sodium chloride 3 mL Intravenous Q12H            Vital Signs:  Blood pressure 125/58, pulse 77, temperature 97.6 °F (36.4 °C), temperature source Oral, resp. rate 16, height 165.1 cm (65\"), weight 136 kg (300 lb), SpO2 99 %.    Flowsheet Rows      First Filed Value   Admission Height  165.1 cm (65\") Documented at 10/14/2018 1215   Admission Weight  136 kg (300 lb) Documented at 10/14/2018 1215          10/16 0701 - 10/17 0700  In: 1899 [P.O.:1037]  Out: 1000 [Urine:1000]    Physical Exam:    General Appearance: NAD, alert and cooperative, Ox3  Eyes: PER, conjunctivae and sclerae normal, no icterus  Lungs: respirations regular and unlabored, no crepitus, clear to auscultation  Heart/CV: regular rhythm & normal rate, no murmur, no gallop, no rub and TR. edema  Abdomen: not distended, soft, non-tender, no masses,  bowel sounds present  Skin: No rash, Warm and dry    Radiology:            Labs:    Results from last 7 days  Lab Units 10/17/18  0543 10/16/18  1936 10/16/18  0802 10/15/18  0633 10/14/18  1329   WBC 10*3/mm3  --   --  12.46* 11.79* 14.20*   HEMOGLOBIN g/dL 9.0* 6.8* 6.9* 5.1* 7.0*   HEMATOCRIT % 29.0* 22.9* 22.8* 23.8* 23.1*   PLATELETS " 10*3/mm3  --   --  210 194 238       Results from last 7 days  Lab Units 10/16/18  0802 10/15/18  0625 10/14/18  1329   SODIUM mmol/L 143 144 144   POTASSIUM mmol/L 5.6* 5.8* 5.9*   CHLORIDE mmol/L 115* 115* 119*   CO2 mmol/L 20.0 20.0 18.0*   BUN mg/dL 78* 77* 73*   CREATININE mg/dL 4.42* 4.33* 4.29*   CALCIUM mg/dL 8.2* 8.2* 8.7   PHOSPHORUS mg/dL 6.4*  --   --    MAGNESIUM mg/dL 1.5 1.3  --    ALBUMIN g/dL 3.12*  --  3.37       Results from last 7 days  Lab Units 10/16/18  0802   GLUCOSE mg/dL 147*         Results from last 7 days  Lab Units 10/14/18  1329   ALK PHOS U/L 86   BILIRUBIN mg/dL 0.2*   ALT (SGPT) U/L 14   AST (SGOT) U/L 15       Results from last 7 days  Lab Units 10/14/18  1851   PH, ARTERIAL pH units 7.186*   PO2 ART mm Hg 127.0*   PCO2, ARTERIAL mm Hg 48.1*   HCO3 ART mmol/L 18.2*         Results from last 7 days  Lab Units 10/14/18  1340   COLOR UA  Yellow   CLARITY UA  Turbid*   PH, URINE  6.0   SPECIFIC GRAVITY, URINE  1.020   GLUCOSE UA  Negative   KETONES UA  Negative   BILIRUBIN UA  Negative   PROTEIN UA  100 mg/dL (2+)*   BLOOD UA  Small (1+)*   LEUKOCYTES UA  Large (3+)*   NITRITE UA  Negative       Estimated Creatinine Clearance: 17.3 mL/min (A) (by C-G formula based on SCr of 4.42 mg/dL (H)).      Assessment       Hypertension    Leukocytosis    UTI (urinary tract infection)    Chronic heart failure (CMS/HCC)    CKD (chronic kidney disease) stage 4, GFR 15-29 ml/min (CMS/HCC)    Diabetes mellitus, type II (CMS/HCC)    Anemia of chronic renal failure, stage 5 (CMS/HCC)    Hyperkalemia    Metabolic encephalopathy    Severe sepsis (CMS/HCC)    Sepsis (CMS/HCC)            Impression: ESRD. ANEMIA.            Recommendations: TUNNELED HD TODAY. 1ST HD TX 10/18/18.      Rocky Nova MD  10/17/18  3:26 PM          Electronically signed by Rocky Nova MD at 10/17/2018  3:28 PM     Velasquez Olivo MD at 10/17/2018 12:00 PM          INFECTIOUS DISEASE PROGRESS  NOTE    Joy Bueno  1950  [id number]      Admission Date: 10/14/2018      Requesting Provider: No ref. provider found  Evaluating Physician: Velasquez Olivo MD    Reason for Consultation:  ESBL Ecoli  UTI      History of present illness:    10/15/18: Patient is a 68 y.o. female seen today for a UTI.  She was brought from her nursing home with increasing lethargy, and  confusion over the past several days.  She was hospitalized here in August and treated with Invanz for an ESBL Ecoli UTI.  On admission, her urinalysis had too  Numerous to count white cells, and urine culture now with Gram negative rods . She is hypothermic,  With a leukocytosis of 14, 000,  hgb of 7.0, Scr 4.29. Ertapenem was initiated and we were consulted for evaluation and treatment.    10/16/18: She denies nausea and vomiting.  She has decreased dyspnea.  She is scheduled for hemodialysis access by Dr. Calderón.  I discussed her situation with Dr. Calderón today.  10/17/18:  She is now on nasal cannula.  She will  Have her dialysis access later today.  Remains afebrile.     Past Medical History:   Diagnosis Date   • Anemia    • Anxiety    • Asthma    • Chronic kidney disease    • Diabetes mellitus (CMS/[facility])    • Diabetes mellitus, type II (CMS/[facility]) 8/3/2018   • History of Clostridium difficile infection 8/3/2018   • History of respiratory failure, since off home oxygen 8/3/2018   • History of UTI 8/3/2018   • Hypertension    • Morbid obesity (CMS/[facility])    • Osteoarthritis    • Tinea capitis 8/3/2018       Past Surgical History:   Procedure Laterality Date   •  SECTION     • COLONOSCOPY N/A 2017    Procedure: COLONOSCOPY;  Surgeon: Mark I Brunner, MD;  Location:  [facility] END[facility]COPY;  Service:    • ENDOSCOPY N/A 2017    Procedure: ESOPHAGOGASTRODUODENOSCOPY ;  Surgeon: Velsaquez Call MD;  Location:  [facility] ENDOSCOPY;  Service:    • HERNIA REPAIR         Family History   Problem Relation Age of Onset   • Cardiomyopathy  Mother    • Stroke Father    • Diabetes Father        Social History     Social History   • Marital status:      Spouse name: N/A   • Number of children: N/A   • Years of education: N/A     Occupational History   • Not on file.     Social History Main Topics   • Smoking status: Former Smoker     Packs/day: 0.25   • Smokeless tobacco: Not on file      Comment: unknown when quit, maybe last year   • Alcohol use No   • Drug use: No   • Sexual activity: No     Other Topics Concern   • Not on file     Social History Narrative    Mrs. Bueno is a 68 year old AA  female. She lives alone in [place] but has been in rehab for some time. She has a daughter. She does not have an advanced directive.       Allergies   Allergen Reactions   • Geodon [Ziprasidone Hcl] Unknown (See Comments)     PT DOESN'T REMEMBER   • Haldol [Haloperidol Lactate] Unknown (See Comments)     PT DOESN'T REMEMBER   • Seroquel [Quetiapine Fumarate] Unknown (See Comments)     PT DOESN'T REMEMBER         Medication:    Current Facility-Administered Medications:   •  [MAR Hold] acetaminophen (TYLENOL) tablet 650 mg, 650 mg, Oral, Q4H PRN, Doris Heard, APRN, 650 mg at 10/16/18 1332  •  amLODIPine (NORVASC) tablet 10 mg, 10 mg, Oral, Daily, Doris Heard APRN, 10 mg at 10/17/18 0910  •  [MAR Hold] atorvastatin (LIPITOR) tablet 10 mg, 10 mg, Oral, Nightly, Doris Heard, APRN, 10 mg at 10/16/18 2051  •  [MAR Hold] budesonide-formoterol (SYMBICORT) 160-4.5 MCG/ACT inhaler 2 puff, 2 puff, Inhalation, BID - RT, Doris Heard APRN, 2 puff at 10/17/18 0936  •  carvedilol (COREG) tablet 25 mg, 25 mg, Oral, BID With Meals, Doris Heard APRGISEL, Stopped at 10/17/18 0911  •  [MAR Hold] dextrose (D50W) 25 g/ 50mL Intravenous Solution 25 g, 25 g, Intravenous, Q15 Min PRN, Doris Heard APRN  •  [MAR Hold] dextrose (GLUTOSE) oral gel 15 g, 15 g, Oral, Q15 Min PRN, Doris Heard, SUELLEN  •  [MAR Hold] epoetin porter (EPOGEN,PROCRIT)  injection 20,000 Units, 20,000 Units, Subcutaneous, Once per day on Mon Wed Fri, Rocky Nova MD, Stopped at 10/17/18 0911  •  [MAR Hold] ertapenem (INVanz) 500 mg in sodium chloride 0.9 % 50 mL IVPB, 500 mg, Intravenous, Q24H, Velasquez Olivo MD, Last Rate: 100 mL/hr at 10/16/18 1730, 500 mg at 10/16/18 1730  •  [MAR Hold] glucagon (human recombinant) (GLUCAGEN DIAGNOSTIC) injection 1 mg, 1 mg, Subcutaneous, PRN, Doris Heard APRN  •  [MAR Hold] heparin (porcine) 5000 UNIT/ML injection 5,000 Units, 5,000 Units, Subcutaneous, Q12H, Doris Heard APRN, 5,000 Units at 10/17/18 0911  •  hydrALAZINE (APRESOLINE) tablet 100 mg, 100 mg, Oral, TID, Doris Heard APRN, 100 mg at 10/17/18 0910  •  [MAR Hold] Influenza Vac Subunit Quad (FLUCELVAX) injection 0.5 mL, 0.5 mL, Intramuscular, During Hospitalization, Paula Silver MD  •  [MAR Hold] insulin lispro (humaLOG) injection 0-7 Units, 0-7 Units, Subcutaneous, 4x Daily With Meals & Nightly, Doris Heard APRN, 2 Units at 10/16/18 1731  •  [MAR Hold] isosorbide mononitrate (IMDUR) 24 hr tablet 30 mg, 30 mg, Oral, Daily, Doris Heard APRN, 30 mg at 10/17/18 0910  •  lactated ringers infusion, 9 mL/hr, Intravenous, Continuous, Jesus Benavides MD  •  lidocaine PF 1% (XYLOCAINE) injection 0.5 mL, 0.5 mL, Injection, Once PRN, Jesus Benavides MD  •  [MAR Hold] ondansetron (ZOFRAN) tablet 4 mg, 4 mg, Oral, Q6H PRN **OR** [MAR Hold] ondansetron (ZOFRAN) injection 4 mg, 4 mg, Intravenous, Q6H PRN, Doris Heard APRGISEL  •  [MAR Hold] pantoprazole (PROTONIX) EC tablet 40 mg, 40 mg, Oral, Q AM, Doris Heard APRN, 40 mg at 10/17/18 0557  •  [MAR Hold] Patiromer Sorbitex Calcium pack 1 packet, 16.8 g, Oral, Once, Chaparrita Olmedo, [facility], Stopped at 10/15/18 1632  •  [MAR Hold] polyethylene glycol 3350 powder (packet), 17 g, Oral, Daily, Doris Heard APRN, Stopped at 10/17/18 0912  •  [MAR Hold] saccharomyces boulardii (FLORASTOR)  capsule 250 mg, 250 mg, Oral, BID, Doris Heard APRN, 250 mg at 10/17/18 0910  •  [MAR Hold] sertraline (ZOLOFT) tablet 50 mg, 50 mg, Oral, Daily, Doris Heard APRN, 50 mg at 10/17/18 0910  •  [MAR Hold] sodium bicarbonate tablet 1,300 mg, 1,300 mg, Oral, BID, Rocky Nova MD, 1,300 mg at 10/17/18 0910  •  [MAR Hold] sodium chloride 0.9 % flush 3 mL, 3 mL, Intravenous, Q12H, Doris Heard APRN, 3 mL at 10/16/18 2057  •  sodium chloride 0.9 % flush 3 mL, 3 mL, Intravenous, Q12H, Jesus Benavides MD  •  [MAR Hold] sodium chloride 0.9 % flush 3-10 mL, 3-10 mL, Intravenous, PRN, Doris Heard APRN  •  sodium chloride 0.9 % flush 3-10 mL, 3-10 mL, Intravenous, PRN, Jesus Benavides MD    Antibiotics:  Anti-Infectives     Ordered     Dose/Rate Route Frequency Start Stop    10/15/18 0920  [MAR Hold]  ertapenem (INVanz) 500 mg in sodium chloride 0.9 % 50 mL IVPB     (MAR Hold since 10/17/18 1604)   Ordering Provider:  Velasquez Olivo MD    500 mg  100 mL/hr over 30 Minutes Intravenous Every 24 Hours 10/15/18 1800 10/20/18 1759    10/14/18 1512  ertapenem (INVanz) 1 g/100 mL 0.9% NS VTB (mbp)     Ordering Provider:  Raheel Brunson PA    1 g  over 30 Minutes Intravenous Once 10/14/18 1514 10/14/18 1701            Review of Systems:  No reliable ROS from patient     Physical Exam:   Vital Signs  Temp (24hrs), Av.7 °F (36.5 °C), Min:97.1 °F (36.2 °C), Max:98.1 °F (36.7 °C)    Temp  Min: 97.1 °F (36.2 °C)  Max: 98.1 °F (36.7 °C)  BP  Min: 103/44  Max: 159/67  Pulse  Min: 68  Max: 77  Resp  Min: 14  Max: 20  SpO2  Min: 94 %  Max: 100 %    GENERAL drowsy .  She is in no acute distress..   HEENT: Normocephalic, atraumatic.  PERRL. EOMI. No conjunctival injection. No icterus. Oropharynx clear without evidence of thrush or exudate.   NECK: Supple without nuchal rigidity. No mass.  LYMPH: No cervical, axillary or inguinal lymphadenopathy.  HEART: RRR; No murmur, rubs, gallops.   LUNGS:   Few rhonchi  Anteriorly   ABDOMEN: Soft, nontender,firm,  bowel sounds quiet  No rebound or guarding. NO mass or HSM.  EXT:  No cyanosis, clubbing or edema. No cord.  : Genitalia generally unremarkable. purewick in place   MSK: FROM without joint effusions noted arms/legs.    SKIN: Warm and dry  Scalp eczema,   Neuro: drowsy but is confused.    Laboratory Data      Results from last 7 days  Lab Units 10/17/18  0543 10/16/18  1936 10/16/18  0802 10/15/18  0633 10/14/18  1329   WBC 10*3/mm3  --   --  12.46* 11.79* 14.20*   HEMOGLOBIN g/dL 9.0* 6.8* 6.9* 5.1* 7.0*   HEMATOCRIT % 29.0* 22.9* 22.8* 23.8* 23.1*   PLATELETS 10*3/mm3  --   --  210 194 238       Results from last 7 days  Lab Units 10/16/18  0802   SODIUM mmol/L 143   POTASSIUM mmol/L 5.6*   CHLORIDE mmol/L 115*   CO2 mmol/L 20.0   BUN mg/dL 78*   CREATININE mg/dL 4.42*   GLUCOSE mg/dL 147*   CALCIUM mg/dL 8.2*       Results from last 7 days  Lab Units 10/14/18  1329   ALK PHOS U/L 86   BILIRUBIN mg/dL 0.2*   ALT (SGPT) U/L 14   AST (SGOT) U/L 15           UA  TNTC WBCS              Estimated Creatinine Clearance: 17.3 mL/min (A) (by C-G formula based on SCr of 4.42 mg/dL (H)).      Microbiology:  Blood Culture   Date Value Ref Range Status   10/14/2018 No growth at less than 24 hours  Preliminary   10/14/2018 No growth at less than 24 hours  Preliminary                    Urine Culture   Date Value Ref Range Status   10/14/2018 >100,000 CFU/mL Gram Negative Bacilli (A)  Preliminary      Urine culture is growing ESBL Klebsiella        Radiology:  Imaging Results (last 72 hours)     Procedure Component Value Units Date/Time    XR Chest 2 View [[id number]] Collected:  10/14/18 1443     Updated:  10/14/18 1653    Narrative:          EXAMINATION: XR CHEST 2 VW - 10/14/2018     INDICATION: Lethargy.      COMPARISON: None.     FINDINGS: Two-view chest reveals the heart to be enlarged. Underlying  chronic changes seen throughout the lung fields bilaterally. No  evidence  of acute parenchymal disease. Pulmonary vascularity is pronounced. No  focal parenchymal opacification present. Degenerative change is seen  within the spine.           Impression:       Low lung volumes with chronic changes seen within the lung  fields, enlargement heart and no evidence of acute parenchymal disease.     DICTATED:   10/14/2018  EDITED/ls :   10/14/2018      This report was finalized on 10/14/2018 4:51 PM by Dr. Rosalie Kline MD.       CT Head Without Contrast [[id number]] Collected:  10/14/18 1441     Updated:  10/14/18 1652    Narrative:       EXAMINATION: CT HEAD WO CONTRAST - 10/12/2018     INDICATION: Lethargy, weakness.     TECHNIQUE: Multiple axial CT imaging is obtained of the head from skull  base to skull vertex without the administration of intravenous contrast.     The radiation dose reduction device was turned on for each scan per the  ALARA (As Low as Reasonably Achievable) protocol.     COMPARISON: None.     FINDINGS: The parenchyma is grossly unremarkable. Some low-density area  is seen in the periventricular and subcortical white matter suggesting  chronic small vessel ischemic change. There is no hemorrhage or  hydrocephalus. No mass, mass effect or midline shift. Bony structures  reveal no evidence of osseous abnormality. There is mucosal thickening  of the left maxillary sinus and ethmoid air cells. Globes and orbits are  intact. The mastoid air cells are patent.       Impression:       No acute intracranial abnormality with chronic changes seen  within the brain.     DICTATED:   10/14/2018  EDITED/ls :   10/14/2018      This report was finalized on 10/14/2018 4:50 PM by Dr. Rosalie Kline MD.               Impression:   1.  ESBL Klebsiella UTI-I will continue her on Invanz therapy.    2.  Anemia-severe.   3.  Acute/chronic renal failure/ATN   4.  History of Cdiff-she will remain at risk for relapse of C. difficile colitis due to the need to treat her UTI.  5.   Diabetes Mellitus, type 2-this places her at increased risk for recurrent infections  6.  Encephalopathy-toxic metabolic-this may be secondary to her UTI    PLAN/RECOMMENDATIONS:   1.  Continue intravenous Invanz   2.  Dialysis access-per Dr. Stephane Olivo has obtained the history, performed the physical exam and formulated the above treatment plan.     Velasquez Olivo MD  10/17/2018  5:53 PM                  Electronically signed by Velasquez Olivo MD at 10/17/2018  5:53 PM     Paula Silver MD at 10/17/2018  9:40 AM              [facility] Medicine Services  PROGRESS NOTE    Patient Name: Joy Bueno  : 1950  MRN: [id number]    Date of Admission: 10/14/2018  Length of Stay: 3  Primary Care Physician: Provider, No Known    Subjective   Subjective     CC: f/u for sepsis and NATALIO    HPI: No acute events overnight.  Patient said that she slept okay.  Does endorse some right arm pain.  Shortness of air is improved.      Review of Systems  Gen- No fevers, chills  CV- No chest pain, palpitations  Resp- No cough, dyspnea  GI- No N/V/D, abd pain    Otherwise ROS is negative except as mentioned in the HPI.    Objective   Objective     Vital Signs:   Temp:  [97 °F (36.1 °C)-98.6 °F (37 °C)] 97.1 °F (36.2 °C)  Heart Rate:  [60-75] 72  Resp:  [16-20] 16  BP: (103-159)/(44-67) 149/67     Physical Exam:  Constitutional: Chronically ill lady, in no acute distress, awake, alert  HENT: NCAT, mucous membranes dry.  Respiratory: Nonlabored respirations, no rhonchi no wheezes.   Cardiovascular: RRR, no murmurs, rubs, or gallops, palpable pedal pulses bilaterally  Gastrointestinal: Obese, positive bowel sounds, soft, nontender, nondistended  Musculoskeletal: No bilateral ankle edema  Psychiatric: Appropriate affect, cooperative  Neurologic: Oriented x 3, no focal deficits  Skin: No rashes    Results Reviewed:  I have personally reviewed current lab, radiology, and data and  agree.      Results from last 7 days  Lab Units 10/17/18  0543 10/16/18  1936 10/16/18  0802 10/15/18  0633 10/14/18  1329   WBC 10*3/mm3  --   --  12.46* 11.79* 14.20*   HEMOGLOBIN g/dL 9.0* 6.8* 6.9* 5.1* 7.0*   HEMATOCRIT % 29.0* 22.9* 22.8* 23.8* 23.1*   PLATELETS 10*3/mm3  --   --  210 194 238       Results from last 7 days  Lab Units 10/16/18  0802 10/15/18  0625 10/14/18  1329   SODIUM mmol/L 143 144 144   POTASSIUM mmol/L 5.6* 5.8* 5.9*   CHLORIDE mmol/L 115* 115* 119*   CO2 mmol/L 20.0 20.0 18.0*   BUN mg/dL 78* 77* 73*   CREATININE mg/dL 4.42* 4.33* 4.29*   GLUCOSE mg/dL 147* 82 96   CALCIUM mg/dL 8.2* 8.2* 8.7   ALT (SGPT) U/L  --   --  14   AST (SGOT) U/L  --   --  15     Estimated Creatinine Clearance: 17.3 mL/min (A) (by C-G formula based on SCr of 4.42 mg/dL (H)).  BNP   Date Value Ref Range Status   10/14/2018 682.0 (H) 0.0 - 100.0 pg/mL Final     Comment:     Results may be falsely decreased if patient taking Biotin.       Microbiology Results Abnormal     Procedure Component Value - Date/Time    Blood Culture - Blood, [[id number]]  (Normal) Collected:  10/14/18 1606    Lab Status:  Preliminary result Specimen:  Blood from Arm, Right Updated:  10/16/18 1631     Blood Culture No growth at 2 days    Blood Culture - Blood, [[id number]]  (Normal) Collected:  10/14/18 1606    Lab Status:  Preliminary result Specimen:  Blood from Arm, Left Updated:  10/16/18 1631     Blood Culture No growth at 2 days    Urine Culture - Urine, [[id number]]  (Abnormal)  (Susceptibility) Collected:  10/14/18 1340    Lab Status:  Final result Specimen:  Urine from Urine, Catheter Updated:  10/16/18 1403     Urine Culture >100,000 CFU/mL Klebsiella pneumoniae ESBL (C)     Comment:   Consider infectious disease consult.  Susceptibility results may not correlate to clinical outcomes.       Susceptibility      Klebsiella pneumoniae ESBL     AL     Ciprofloxacin >2 ug/ml Resistant     Ertapenem <=1 ug/ml Susceptible     Gentamicin  <=4 ug/ml Susceptible     Levofloxacin >4 ug/ml Resistant     Meropenem <=1 ug/ml Susceptible     Nitrofurantoin 64 ug/ml Intermediate     Piperacillin + Tazobactam <=16 ug/ml Susceptible     Tetracycline >8 ug/ml Resistant     Tobramycin >8 ug/ml Resistant     Trimethoprim + Sulfamethoxazole >2/38 ug/ml Resistant                        Results for orders placed during the hospital encounter of 09/02/17   Adult Transthoracic Echo Complete    Narrative · Left ventricular wall thickness is consistent with mild concentric   hypertrophy.  · Left atrial cavity size is mildly dilated.  · Mild mitral valve regurgitation is present  · calcification of the aortic valve  · Mild tricuspid valve regurgitation is present.          I have reviewed the medications.      amLODIPine 10 mg Oral Daily   atorvastatin 10 mg Oral Nightly   budesonide-formoterol 2 puff Inhalation BID - RT   carvedilol 25 mg Oral BID With Meals   epoetin porter 20,000 Units Subcutaneous Once per day on Mon Wed Fri   ertapenem 500 mg Intravenous Q24H   heparin (porcine) 5,000 Units Subcutaneous Q12H   hydrALAZINE 100 mg Oral TID   insulin lispro 0-7 Units Subcutaneous 4x Daily With Meals & Nightly   isosorbide mononitrate 30 mg Oral Daily   pantoprazole 40 mg Oral Q AM   Patiromer Sorbitex Calcium 16.8 g Oral Once   polyethylene glycol 17 g Oral Daily   saccharomyces boulardii 250 mg Oral BID   sertraline 50 mg Oral Daily   sodium bicarbonate 1,300 mg Oral BID   sodium chloride 3 mL Intravenous Q12H     Assessment/Plan   Assessment / Plan     Active Hospital Problems    Diagnosis   • Hyperkalemia   • Metabolic encephalopathy   • Severe sepsis (CMS/[facility])   • Sepsis (CMS/[facility])   • Anemia of chronic renal failure, stage 5 (CMS/[facility])   • Diabetes mellitus, type II (CMS/[facility])   • CKD (chronic kidney disease) stage 4, GFR 15-29 ml/min (CMS/[facility])   • Chronic heart failure (CMS/[facility])   • UTI (urinary tract infection)   • Leukocytosis   • Hypertension     [facility]  Course to date:  Joy Bueno is a 68 y.o. female past medical history of type 2 diabetes, significant stage IV, chronic heart failure, hypertension.  Presents to [facility], which place with complaints for 5 days of lethargy, decreased by mouth intake, difficult to arouse.  Here patient has been admitted with sepsis and acute metabolic encephalopathy likely secondary to UTI.     Assessment & Plan:  - Severe sepsis likely secondary to ESBL UTI, continue IVF, f/u blood cultures currently on Invanz, ID following  - Acute encephalopathy resolving, likely multifactorial sec to uremia vs sepsis, improving  - NATALIO on CKD stage 4, with metabolic and respiratory acidosis baseline Cr unknown, suspect ATN sec to sepsis, renal consulted, plan for tunneled cath placement today and AVF creation for HD,  - Suspected decompensated HF with elevated BNP and hypoxia, continue O2, diuresis will be limited with her renal failure, will await renal consult  - Previously well controlled T2DM AC1 6.8% (8/3) continue ssi  - Anemia worse, no si/sx of acute blood loss, suspect ACD, continue to monitor, s/p 3 unit PRBC transfuse Hg 6.9 this AM, transfuse 1 unit, trend  - Complex case     DVT Prophylaxis:  [facility]    CODE STATUS:   Code Status and Medical Interventions:   Ordered at: 10/14/18 1942     Level Of Support Discussed With:    Patient     Code Status:    CPR     Medical Interventions (Level of Support Prior to Arrest):    Full     Disposition: TBD    Electronically signed by Paula Silver MD, 10/17/18, 10:09 AM.      Electronically signed by aPula Silver MD at 10/17/2018 10:11 AM     Velasquez Olivo MD at 10/16/2018  4:34 PM          INFECTIOUS DISEASE PROGRESS NOTE    Joy Bueno  1950  [id number]      Admission Date: 10/14/2018      Requesting Provider: No ref. provider found  Evaluating Physician: Velasquez Olivo MD    Reason for Consultation:  ESBL Ecoli  UTI      History of present illness:    10/15/18:  Patient is a 68 y.o. female seen today for a UTI.  She was brought from her nursing home with increasing lethargy, and  confusion over the past several days.  She was hospitalized here in August and treated with Invanz for an ESBL Ecoli UTI.  On admission, her urinalysis had too  Numerous to count white cells, and urine culture now with Gram negative rods . She is hypothermic,  With a leukocytosis of 14, 000,  hgb of 7.0, Scr 4.29. Ertapenem was initiated and we were consulted for evaluation and treatment.    10/16/18: She denies nausea and vomiting.  She has decreased dyspnea.  She is scheduled for hemodialysis access by Dr. Calderón.  I discussed her situation with Dr. Calderón today.    Past Medical History:   Diagnosis Date   • Anemia    • Anxiety    • Asthma    • Chronic kidney disease    • Diabetes mellitus (CMS/[facility])    • Diabetes mellitus, type II (CMS/[facility]) 8/3/2018   • History of Clostridium difficile infection 8/3/2018   • History of respiratory failure, since off home oxygen 8/3/2018   • History of UTI 8/3/2018   • Hypertension    • Morbid obesity (CMS/[facility])    • Osteoarthritis    • Tinea capitis 8/3/2018       Past Surgical History:   Procedure Laterality Date   •  SECTION     • COLONOSCOPY N/A 2017    Procedure: COLONOSCOPY;  Surgeon: Mark I Brunner, MD;  Location:  [facility];  Service:    • ENDOSCOPY N/A 2017    Procedure: ESOPHAGOGASTRODUODENOSCOPY ;  Surgeon: Velasquez Call MD;  Location:  [facility];  Service:    • HERNIA REPAIR         Family History   Problem Relation Age of Onset   • Cardiomyopathy Mother    • Stroke Father    • Diabetes Father        Social History     Social History   • Marital status:      Spouse name: N/A   • Number of children: N/A   • Years of education: N/A     Occupational History   • Not on file.     Social History Main Topics   • Smoking status: Former Smoker     Packs/day: 0.25   • Smokeless tobacco: Not on file      Comment: unknown  when quit, maybe last year   • Alcohol use No   • Drug use: No   • Sexual activity: No     Other Topics Concern   • Not on file     Social History Narrative    Mrs. Bueno is a 68 year old AA  female. She lives alone in [place] but has been in rehab for some time. She has a daughter. She does not have an advanced directive.       Allergies   Allergen Reactions   • Geodon [Ziprasidone Hcl] Unknown (See Comments)     PT DOESN'T REMEMBER   • Haldol [Haloperidol Lactate] Unknown (See Comments)     PT DOESN'T REMEMBER   • Seroquel [Quetiapine Fumarate] Unknown (See Comments)     PT DOESN'T REMEMBER         Medication:    Current Facility-Administered Medications:   •  acetaminophen (TYLENOL) tablet 650 mg, 650 mg, Oral, Q4H PRN, Doris Heard APRN, 650 mg at 10/16/18 1332  •  amLODIPine (NORVASC) tablet 10 mg, 10 mg, Oral, Daily, Doris Heard APRN, 10 mg at 10/16/18 0808  •  atorvastatin (LIPITOR) tablet 10 mg, 10 mg, Oral, Nightly, Doris Heard APRN, 10 mg at 10/15/18 1956  •  budesonide-formoterol (SYMBICORT) 160-4.5 MCG/ACT inhaler 2 puff, 2 puff, Inhalation, BID - RT, Doris Heard APRN, 2 puff at 10/16/18 0707  •  carvedilol (COREG) tablet 25 mg, 25 mg, Oral, BID With Meals, Doris Heard APRN, 25 mg at 10/16/18 0808  •  dextrose (D50W) 25 g/ 50mL Intravenous Solution 25 g, 25 g, Intravenous, Q15 Min PRN, Doris Heard APRN  •  dextrose (GLUTOSE) oral gel 15 g, 15 g, Oral, Q15 Min PRN, Doris Heard APRN  •  epoetin porter (EPOGEN,PROCRIT) injection 20,000 Units, 20,000 Units, Subcutaneous, Once per day on Mon Wed Fri, Rocky Nova MD, 20,000 Units at 10/15/18 1649  •  ertapenem (INVanz) 500 mg in sodium chloride 0.9 % 50 mL IVPB, 500 mg, Intravenous, Q24H, Velasquez Olivo MD, Last Rate: 100 mL/hr at 10/15/18 1808, 500 mg at 10/15/18 1808  •  glucagon (human recombinant) (GLUCAGEN DIAGNOSTIC) injection 1 mg, 1 mg, Subcutaneous, PRN, Doris Heard, APRN  •   heparin (porcine) 5000 UNIT/ML injection 5,000 Units, 5,000 Units, Subcutaneous, Q12H, Doris Heard APRN, 5,000 Units at 10/16/18 0809  •  hydrALAZINE (APRESOLINE) tablet 100 mg, 100 mg, Oral, TID, Doris Heard APRN, 100 mg at 10/16/18 0808  •  Influenza Vac Subunit Quad (FLUCELVAX) injection 0.5 mL, 0.5 mL, Intramuscular, During Hospitalization, Paula Silver MD  •  insulin lispro (humaLOG) injection 0-7 Units, 0-7 Units, Subcutaneous, 4x Daily With Meals & Nightly, Doris Heard APRN, 2 Units at 10/16/18 0813  •  isosorbide mononitrate (IMDUR) 24 hr tablet 30 mg, 30 mg, Oral, Daily, Doris Heard APRN, 30 mg at 10/16/18 0809  •  ondansetron (ZOFRAN) tablet 4 mg, 4 mg, Oral, Q6H PRN **OR** ondansetron (ZOFRAN) injection 4 mg, 4 mg, Intravenous, Q6H PRN, Doris Heard APRN  •  pantoprazole (PROTONIX) EC tablet 40 mg, 40 mg, Oral, Q AM, Doris Heard APRN, 40 mg at 10/16/18 0808  •  Patiromer Sorbitex Calcium pack 1 packet, 16.8 g, Oral, Once, Chaparrita Olmedo, [facility], Stopped at 10/15/18 1632  •  polyethylene glycol 3350 powder (packet), 17 g, Oral, Daily, Doris Heard APRN  •  saccharomyces boulardii (FLORASTOR) capsule 250 mg, 250 mg, Oral, BID, Doris Heard APRN, 250 mg at 10/16/18 0809  •  sertraline (ZOLOFT) tablet 50 mg, 50 mg, Oral, Daily, Doris Heard APRN, 50 mg at 10/16/18 0808  •  sodium bicarbonate tablet 1,300 mg, 1,300 mg, Oral, BID, Rocky Nova MD, 1,300 mg at 10/16/18 0808  •  sodium chloride 0.9 % flush 3 mL, 3 mL, Intravenous, Q12H, Doris Heard, APRN, 3 mL at 10/15/18 1955  •  sodium chloride 0.9 % flush 3-10 mL, 3-10 mL, Intravenous, PRN, Doris Heard, APRN    Antibiotics:  Anti-Infectives     Ordered     Dose/Rate Route Frequency Start Stop    10/15/18 0920  ertapenem (INVanz) 500 mg in sodium chloride 0.9 % 50 mL IVPB     Ordering Provider:  Velasquez Olivo MD    500 mg  100 mL/hr over 30 Minutes Intravenous Every 24 Hours 10/15/18  1800 10/20/18 1759    10/14/18 1512  ertapenem (INVanz) 1 g/100 mL 0.9% NS VTB (mbp)     Ordering Provider:  Raheel Brunson PA    1 g  over 30 Minutes Intravenous Once 10/14/18 1514 10/14/18 1701            Review of Systems:  No reliable ROS from patient     Physical Exam:   Vital Signs  Temp (24hrs), Av.5 °F (36.4 °C), Min:97 °F (36.1 °C), Max:98.6 °F (37 °C)    Temp  Min: 97 °F (36.1 °C)  Max: 98.6 °F (37 °C)  BP  Min: 105/44  Max: 156/64  Pulse  Min: 67  Max: 78  Resp  Min: 18  Max: 20  SpO2  Min: 94 %  Max: 100 %    GENERAL: Or alert and responsive.  She is in no acute distress..   HEENT: Normocephalic, atraumatic.  PERRL. EOMI. No conjunctival injection. No icterus. Oropharynx clear without evidence of thrush or exudate.   NECK: Supple without nuchal rigidity. No mass.  LYMPH: No cervical, axillary or inguinal lymphadenopathy.  HEART: RRR; No murmur, rubs, gallops.   LUNGS:  Few rhonchi   ABDOMEN: Soft, nontender,firm,  bowel sounds quiet  No rebound or guarding. NO mass or HSM.  EXT:  No cyanosis, clubbing or edema. No cord.  : Genitalia generally unremarkable. purewick in place   MSK: FROM without joint effusions noted arms/legs.    SKIN: Warm and dry  Scalp eczema,   Neuro: She is more alert and responsive but is confused.    Laboratory Data      Results from last 7 days  Lab Units 10/16/18  0802 10/15/18  0633 10/14/18  1329   WBC 10*3/mm3 12.46* 11.79* 14.20*   HEMOGLOBIN g/dL 6.9* 5.1* 7.0*   HEMATOCRIT % 22.8* 23.8* 23.1*   PLATELETS 10*3/mm3 210 194 238       Results from last 7 days  Lab Units 10/16/18  0802   SODIUM mmol/L 143   POTASSIUM mmol/L 5.6*   CHLORIDE mmol/L 115*   CO2 mmol/L 20.0   BUN mg/dL 78*   CREATININE mg/dL 4.42*   GLUCOSE mg/dL 147*   CALCIUM mg/dL 8.2*       Results from last 7 days  Lab Units 10/14/18  1329   ALK PHOS U/L 86   BILIRUBIN mg/dL 0.2*   ALT (SGPT) U/L 14   AST (SGOT) U/L 15           UA  TNTC WBCS              Estimated Creatinine Clearance: 17.3 mL/min  (A) (by C-G formula based on SCr of 4.42 mg/dL (H)).      Microbiology:  Blood Culture   Date Value Ref Range Status   10/14/2018 No growth at less than 24 hours  Preliminary   10/14/2018 No growth at less than 24 hours  Preliminary                    Urine Culture   Date Value Ref Range Status   10/14/2018 >100,000 CFU/mL Gram Negative Bacilli (A)  Preliminary      Urine culture is growing ESBL Klebsiella        Radiology:  Imaging Results (last 72 hours)     Procedure Component Value Units Date/Time    XR Chest 2 View [[id number]] Collected:  10/14/18 1443     Updated:  10/14/18 1653    Narrative:          EXAMINATION: XR CHEST 2 VW - 10/14/2018     INDICATION: Lethargy.      COMPARISON: None.     FINDINGS: Two-view chest reveals the heart to be enlarged. Underlying  chronic changes seen throughout the lung fields bilaterally. No evidence  of acute parenchymal disease. Pulmonary vascularity is pronounced. No  focal parenchymal opacification present. Degenerative change is seen  within the spine.           Impression:       Low lung volumes with chronic changes seen within the lung  fields, enlargement heart and no evidence of acute parenchymal disease.     DICTATED:   10/14/2018  EDITED/ls :   10/14/2018      This report was finalized on 10/14/2018 4:51 PM by Dr. Rosalie Kline MD.       CT Head Without Contrast [[id number]] Collected:  10/14/18 1441     Updated:  10/14/18 1652    Narrative:       EXAMINATION: CT HEAD WO CONTRAST - 10/12/2018     INDICATION: Lethargy, weakness.     TECHNIQUE: Multiple axial CT imaging is obtained of the head from skull  base to skull vertex without the administration of intravenous contrast.     The radiation dose reduction device was turned on for each scan per the  ALARA (As Low as Reasonably Achievable) protocol.     COMPARISON: None.     FINDINGS: The parenchyma is grossly unremarkable. Some low-density area  is seen in the periventricular and subcortical white matter  suggesting  chronic small vessel ischemic change. There is no hemorrhage or  hydrocephalus. No mass, mass effect or midline shift. Bony structures  reveal no evidence of osseous abnormality. There is mucosal thickening  of the left maxillary sinus and ethmoid air cells. Globes and orbits are  intact. The mastoid air cells are patent.       Impression:       No acute intracranial abnormality with chronic changes seen  within the brain.     DICTATED:   10/14/2018  EDITED/ls :   10/14/2018      This report was finalized on 10/14/2018 4:50 PM by Dr. Rosalie Kline MD.               Impression:   1.  ESBL Klebsiella UTI-I will continue her on Invanz therapy.    2.  Anemia-severe.   3.  Acute/chronic renal failure/ATN   4.  History of Cdiff-she will remain at risk for relapse of C. difficile colitis due to the need to treat her UTI.  5.  Diabetes Mellitus, type 2-this places her at increased risk for recurrent infections  6.  Encephalopathy-toxic metabolic-this may be secondary to her UTI    PLAN/RECOMMENDATIONS:   1.  Continue intravenous Invanz   2.  Dialysis access-per Dr. Stephane Olivo MD  10/16/2018  4:34 PM                  Electronically signed by Velasquez Olivo MD at 10/16/2018  4:37 PM     Rocky Nova MD at 10/16/2018 11:45 AM             LOS: 2 days    Patient Care Team:  Provider, No Known as PCP - General    Reason For Visit:  F/U STAGE 5 CKD.  Subjective           Review of Systems:    Pulm: No soa   CV:  No CP      Objective       amLODIPine 10 mg Oral Daily   atorvastatin 10 mg Oral Nightly   budesonide-formoterol 2 puff Inhalation BID - RT   carvedilol 25 mg Oral BID With Meals   epoetin porter 20,000 Units Subcutaneous Once per day on Mon Wed Fri   ertapenem 500 mg Intravenous Q24H   heparin (porcine) 5,000 Units Subcutaneous Q12H   hydrALAZINE 100 mg Oral TID   insulin lispro 0-7 Units Subcutaneous 4x Daily With Meals & Nightly   isosorbide mononitrate 30 mg Oral Daily  "  pantoprazole 40 mg Oral Q AM   Patiromer Sorbitex Calcium 16.8 g Oral Once   polyethylene glycol 17 g Oral Daily   saccharomyces boulardii 250 mg Oral BID   sertraline 50 mg Oral Daily   sodium bicarbonate 1,300 mg Oral BID   sodium chloride 3 mL Intravenous Q12H   sodium polystyrene 30 g Oral Once            Vital Signs:  Blood pressure 117/77, pulse 71, temperature 98 °F (36.7 °C), temperature source Oral, resp. rate 20, height 165.1 cm (65\"), weight 136 kg (300 lb), SpO2 99 %.    Flowsheet Rows      First Filed Value   Admission Height  165.1 cm (65\") Documented at 10/14/2018 1215   Admission Weight  136 kg (300 lb) Documented at 10/14/2018 1215          10/15 0701 - 10/16 0700  In: 1580 [P.O.:960]  Out: 800 [Urine:800]    Physical Exam:    General Appearance: NAD, alert and cooperative, Ox3  Eyes: PER, conjunctivae and sclerae normal, no icterus  Lungs: respirations regular and unlabored, no crepitus, clear to auscultation  Heart/CV: regular rhythm & normal rate, no murmur, no gallop, no rub and TR edema  Abdomen: not distended, soft, non-tender, no masses,  bowel sounds present  Skin: No rash, Warm and dry    Radiology:            Labs:    Results from last 7 days  Lab Units 10/16/18  0802 10/15/18  0633 10/14/18  1329   WBC 10*3/mm3 12.46* 11.79* 14.20*   HEMOGLOBIN g/dL 6.9* 5.1* 7.0*   HEMATOCRIT % 22.8* 23.8* 23.1*   PLATELETS 10*3/mm3 210 194 238       Results from last 7 days  Lab Units 10/16/18  0802 10/15/18  0625 10/14/18  1329   SODIUM mmol/L 143 144 144   POTASSIUM mmol/L 5.6* 5.8* 5.9*   CHLORIDE mmol/L 115* 115* 119*   CO2 mmol/L 20.0 20.0 18.0*   BUN mg/dL 78* 77* 73*   CREATININE mg/dL 4.42* 4.33* 4.29*   CALCIUM mg/dL 8.2* 8.2* 8.7   PHOSPHORUS mg/dL 6.4*  --   --    MAGNESIUM mg/dL 1.5 1.3  --    ALBUMIN g/dL 3.12*  --  3.37       Results from last 7 days  Lab Units 10/16/18  0802   GLUCOSE mg/dL 147*         Results from last 7 days  Lab Units 10/14/18  1329   ALK PHOS U/L 86   BILIRUBIN " mg/dL 0.2*   ALT (SGPT) U/L 14   AST (SGOT) U/L 15       Results from last 7 days  Lab Units 10/14/18  1851   PH, ARTERIAL pH units 7.186*   PO2 ART mm Hg 127.0*   PCO2, ARTERIAL mm Hg 48.1*   HCO3 ART mmol/L 18.2*         Results from last 7 days  Lab Units 10/14/18  1340   COLOR UA  Yellow   CLARITY UA  Turbid*   PH, URINE  6.0   SPECIFIC GRAVITY, URINE  1.020   GLUCOSE UA  Negative   KETONES UA  Negative   BILIRUBIN UA  Negative   PROTEIN UA  100 mg/dL (2+)*   BLOOD UA  Small (1+)*   LEUKOCYTES UA  Large (3+)*   NITRITE UA  Negative       Estimated Creatinine Clearance: 17.3 mL/min (A) (by C-G formula based on SCr of 4.42 mg/dL (H)).      Assessment       Hypertension    Leukocytosis    UTI (urinary tract infection)    Chronic heart failure (CMS/[facility])    CKD (chronic kidney disease) stage 4, GFR 15-29 ml/min (CMS/[facility])    Diabetes mellitus, type II (CMS/[facility])    Anemia of chronic renal failure, stage 5 (CMS/[facility])    Hyperkalemia    Metabolic encephalopathy    Severe sepsis (CMS/[facility])    Sepsis (CMS/[facility])            Impression: STAGE 5 CKD. ANEMIA. HYPERKALEMIA.             Recommendations: TUNNELED HD CATH SOON AND START DIALYSIS.      Rocky Nova MD  10/16/18  11:46 AM          Electronically signed by Rocky Nova MD at 10/16/2018 11:48 AM     Paula Silver MD at 10/16/2018 10:02 AM              [facility] Medicine Services  PROGRESS NOTE    Patient Name: Joy Bueno  : 1950  MRN: [id number]    Date of Admission: 10/14/2018  Length of Stay: 2  Primary Care Physician: Provider, No Known    Subjective   Subjective     CC: f/u sepsis/UTI and NATALIO    HPI:No acute events overnight, patient is awake and alert, however does endorse pain      Review of Systems  Gen- No fevers, chills  CV- No chest pain, palpitations  Resp- No cough, dyspnea  GI- No N/V/D, abd pain    Otherwise ROS is negative except as mentioned in the HPI.    Objective   Objective     Vital Signs:    Temp:  [96.5 °F (35.8 °C)-98 °F (36.7 °C)] 98 °F (36.7 °C)  Heart Rate:  [71-78] 71  Resp:  [18-20] 20  BP: (117-156)/(53-78) 117/77  FiO2 (%):  [24 %] 24 %        Physical Exam:  Constitutional: Chronically ill lady, in no acute distress, awake, alert  HENT: NCAT, mucous membranes moist, dry  Respiratory: Non-labored respirations, coarse BS, no wheezes,respiratory effort normal   Cardiovascular: RRR, no murmurs, rubs, or gallops, palpable pedal pulses bilaterally  Gastrointestinal: obese, positive bowel sounds, soft, nontender, nondistended  Musculoskeletal: No bilateral ankle edema  Psychiatric: Appropriate affect, cooperative  Neurologic: Oriented x 3, no focal deficits  Skin: No rashes    Results Reviewed:  I have personally reviewed current lab, radiology, and data and agree.      Results from last 7 days  Lab Units 10/16/18  0802 10/15/18  0633 10/14/18  1329   WBC 10*3/mm3 12.46* 11.79* 14.20*   HEMOGLOBIN g/dL 6.9* 5.1* 7.0*   HEMATOCRIT % 22.8* 23.8* 23.1*   PLATELETS 10*3/mm3 210 194 238       Results from last 7 days  Lab Units 10/16/18  0802 10/15/18  0625 10/14/18  1329   SODIUM mmol/L 143 144 144   POTASSIUM mmol/L 5.6* 5.8* 5.9*   CHLORIDE mmol/L 115* 115* 119*   CO2 mmol/L 20.0 20.0 18.0*   BUN mg/dL 78* 77* 73*   CREATININE mg/dL 4.42* 4.33* 4.29*   GLUCOSE mg/dL 147* 82 96   CALCIUM mg/dL 8.2* 8.2* 8.7   ALT (SGPT) U/L  --   --  14   AST (SGOT) U/L  --   --  15     Estimated Creatinine Clearance: 17.3 mL/min (A) (by C-G formula based on SCr of 4.42 mg/dL (H)).  BNP   Date Value Ref Range Status   10/14/2018 682.0 (H) 0.0 - 100.0 pg/mL Final     Comment:     Results may be falsely decreased if patient taking Biotin.       Microbiology Results Abnormal     Procedure Component Value - Date/Time    Blood Culture - Blood, [[id number]]  (Normal) Collected:  10/14/18 1606    Lab Status:  Preliminary result Specimen:  Blood from Arm, Left Updated:  10/15/18 1631     Blood Culture No growth at 24 hours     Blood Culture - Blood, [[id number]]  (Normal) Collected:  10/14/18 1606    Lab Status:  Preliminary result Specimen:  Blood from Arm, Right Updated:  10/15/18 1631     Blood Culture No growth at 24 hours    Urine Culture - Urine, [[id number]]  (Abnormal) Collected:  10/14/18 1340    Lab Status:  Preliminary result Specimen:  Urine from Urine, Catheter Updated:  10/15/18 0719     Urine Culture >100,000 CFU/mL Gram Negative Bacilli (A)          Imaging Results (last 24 hours)     ** No results found for the last 24 hours. **        Results for orders placed during the hospital encounter of 09/02/17   Adult Transthoracic Echo Complete    Narrative · Left ventricular wall thickness is consistent with mild concentric   hypertrophy.  · Left atrial cavity size is mildly dilated.  · Mild mitral valve regurgitation is present  · calcification of the aortic valve  · Mild tricuspid valve regurgitation is present.          I have reviewed the medications.      amLODIPine 10 mg Oral Daily   atorvastatin 10 mg Oral Nightly   budesonide-formoterol 2 puff Inhalation BID - RT   carvedilol 25 mg Oral BID With Meals   epoetin porter 20,000 Units Subcutaneous Once per day on Mon Wed Fri   ertapenem 500 mg Intravenous Q24H   heparin (porcine) 5,000 Units Subcutaneous Q12H   hydrALAZINE 100 mg Oral TID   insulin lispro 0-7 Units Subcutaneous 4x Daily With Meals & Nightly   isosorbide mononitrate 30 mg Oral Daily   pantoprazole 40 mg Oral Q AM   Patiromer Sorbitex Calcium 16.8 g Oral Once   polyethylene glycol 17 g Oral Daily   saccharomyces boulardii 250 mg Oral BID   sertraline 50 mg Oral Daily   sodium bicarbonate 1,300 mg Oral BID   sodium chloride 3 mL Intravenous Q12H         Assessment/Plan   Assessment / Plan     Active Hospital Problems    Diagnosis   • Hyperkalemia   • Metabolic encephalopathy   • Severe sepsis (CMS/HCC)   • Sepsis (CMS/HCC)   • Anemia of chronic renal failure, stage 5 (CMS/HCC)   • Diabetes mellitus, type II  (CMS/[facility])   • CKD (chronic kidney disease) stage 4, GFR 15-29 ml/min (CMS/[facility])   • Chronic heart failure (CMS/[facility])   • UTI (urinary tract infection)   • Leukocytosis   • Hypertension        Brief Hospital Course to date:  Joy Bueno is a 68 y.o. female past medical history of type 2 diabetes, significant stage IV, chronic heart failure, hypertension.  Presents to [facility], which place with complaints for 5 days of lethargy, decreased by mouth intake, difficult to arouse.  Here patient has been admitted with sepsis and acute metabolic encephalopathy likely secondary to UTI.     Assessment & Plan:  - Severe sepsis likely secondary to UTI, continue IVF, f/u blood cultures currently on Invanz, ID has been consulted  - Acute encephalopathy, likely multifactorial sec to uremia vs sepsis, improving  - Hx of ESBL UTI continue abx as above, f/u urine cultures  - NATALIO on CKD stage 4, with metabolic and respiratory acidosis baseline Cr unknown, suspect ATN sec to sepsis, renal consulted, plan for tunneled cath placement and AVF creation for HD,  - Suspected decompensated HF with elevated BNP and hypoxia, continue O2, diuresis will be limited with her renal failure, will await renal consult  - Previously well controlled T2DM AC1 6.8% (8/3) continue ssi  - Anemia worse, no si/sx of acute blood loss, suspect ACD, continue to monitor, s/p 3 unit PRBC transfuse Hg 6.9 this AM, transfuse 1 unit, trend  - Complex case     DVT Prophylaxis:  [facility]    CODE STATUS:   Code Status and Medical Interventions:   Ordered at: 10/14/18 1942     Level Of Support Discussed With:    Patient     Code Status:    CPR     Medical Interventions (Level of Support Prior to Arrest):    Full       Disposition: TBD      Electronically signed by Paula Silver MD, 10/16/18, 10:02 AM.      Electronically signed by Paula Silver MD at 10/16/2018 10:10 AM

## 2018-10-19 ENCOUNTER — APPOINTMENT (OUTPATIENT)
Dept: NEPHROLOGY | Facility: HOSPITAL | Age: 68
End: 2018-10-19
Attending: INTERNAL MEDICINE

## 2018-10-19 LAB
ALBUMIN SERPL-MCNC: 3.09 G/DL (ref 3.2–4.8)
ANION GAP SERPL CALCULATED.3IONS-SCNC: 9 MMOL/L (ref 3–11)
BACTERIA SPEC AEROBE CULT: NORMAL
BACTERIA SPEC AEROBE CULT: NORMAL
BASOPHILS # BLD AUTO: 0.02 10*3/MM3 (ref 0–0.2)
BASOPHILS NFR BLD AUTO: 0.2 % (ref 0–1)
BUN BLD-MCNC: 56 MG/DL (ref 9–23)
BUN/CREAT SERPL: 16.8 (ref 7–25)
CALCIUM SPEC-SCNC: 8.6 MG/DL (ref 8.7–10.4)
CHLORIDE SERPL-SCNC: 111 MMOL/L (ref 99–109)
CO2 SERPL-SCNC: 22 MMOL/L (ref 20–31)
CREAT BLD-MCNC: 3.34 MG/DL (ref 0.6–1.3)
DEPRECATED RDW RBC AUTO: 57.3 FL (ref 37–54)
EOSINOPHIL # BLD AUTO: 0.23 10*3/MM3 (ref 0–0.3)
EOSINOPHIL NFR BLD AUTO: 1.8 % (ref 0–3)
ERYTHROCYTE [DISTWIDTH] IN BLOOD BY AUTOMATED COUNT: 16.7 % (ref 11.3–14.5)
GFR SERPL CREATININE-BSD FRML MDRD: 17 ML/MIN/1.73
GLUCOSE BLD-MCNC: 103 MG/DL (ref 70–100)
GLUCOSE BLDC GLUCOMTR-MCNC: 104 MG/DL (ref 70–130)
GLUCOSE BLDC GLUCOMTR-MCNC: 115 MG/DL (ref 70–130)
GLUCOSE BLDC GLUCOMTR-MCNC: 117 MG/DL (ref 70–130)
GLUCOSE BLDC GLUCOMTR-MCNC: 191 MG/DL (ref 70–130)
HCT VFR BLD AUTO: 27.3 % (ref 34.5–44)
HGB BLD-MCNC: 8.6 G/DL (ref 11.5–15.5)
IMM GRANULOCYTES # BLD: 0.37 10*3/MM3 (ref 0–0.03)
IMM GRANULOCYTES NFR BLD: 2.9 % (ref 0–0.6)
LYMPHOCYTES # BLD AUTO: 1.54 10*3/MM3 (ref 0.6–4.8)
LYMPHOCYTES NFR BLD AUTO: 11.9 % (ref 24–44)
MCH RBC QN AUTO: 29.8 PG (ref 27–31)
MCHC RBC AUTO-ENTMCNC: 31.5 G/DL (ref 32–36)
MCV RBC AUTO: 94.5 FL (ref 80–99)
MONOCYTES # BLD AUTO: 1.07 10*3/MM3 (ref 0–1)
MONOCYTES NFR BLD AUTO: 8.2 % (ref 0–12)
NEUTROPHILS # BLD AUTO: 10.11 10*3/MM3 (ref 1.5–8.3)
NEUTROPHILS NFR BLD AUTO: 77.9 % (ref 41–71)
PHOSPHATE SERPL-MCNC: 4.1 MG/DL (ref 2.4–5.1)
PLATELET # BLD AUTO: 205 10*3/MM3 (ref 150–450)
PMV BLD AUTO: 10.6 FL (ref 6–12)
POTASSIUM BLD-SCNC: 4 MMOL/L (ref 3.5–5.5)
RBC # BLD AUTO: 2.89 10*6/MM3 (ref 3.89–5.14)
SODIUM BLD-SCNC: 142 MMOL/L (ref 132–146)
WBC NRBC COR # BLD: 12.97 10*3/MM3 (ref 3.5–10.8)

## 2018-10-19 PROCEDURE — 99233 SBSQ HOSP IP/OBS HIGH 50: CPT | Performed by: INTERNAL MEDICINE

## 2018-10-19 PROCEDURE — 25010000002 HEPARIN (PORCINE) PER 1000 UNITS: Performed by: NURSE PRACTITIONER

## 2018-10-19 PROCEDURE — 63510000001 EPOETIN ALFA PER 1000 UNITS: Performed by: INTERNAL MEDICINE

## 2018-10-19 PROCEDURE — 82962 GLUCOSE BLOOD TEST: CPT

## 2018-10-19 PROCEDURE — 94799 UNLISTED PULMONARY SVC/PX: CPT

## 2018-10-19 PROCEDURE — 97530 THERAPEUTIC ACTIVITIES: CPT

## 2018-10-19 PROCEDURE — 80069 RENAL FUNCTION PANEL: CPT | Performed by: INTERNAL MEDICINE

## 2018-10-19 PROCEDURE — 97110 THERAPEUTIC EXERCISES: CPT

## 2018-10-19 PROCEDURE — 25010000002 HEPARIN (PORCINE) PER 1000 UNITS: Performed by: INTERNAL MEDICINE

## 2018-10-19 PROCEDURE — 5A1D70Z PERFORMANCE OF URINARY FILTRATION, INTERMITTENT, LESS THAN 6 HOURS PER DAY: ICD-10-PCS | Performed by: INTERNAL MEDICINE

## 2018-10-19 PROCEDURE — 85025 COMPLETE CBC W/AUTO DIFF WBC: CPT | Performed by: INTERNAL MEDICINE

## 2018-10-19 RX ORDER — HEPARIN SODIUM 1000 [USP'U]/ML
1900 INJECTION, SOLUTION INTRAVENOUS; SUBCUTANEOUS AS NEEDED
Status: DISCONTINUED | OUTPATIENT
Start: 2018-10-19 | End: 2018-10-23 | Stop reason: HOSPADM

## 2018-10-19 RX ORDER — ALBUMIN (HUMAN) 12.5 G/50ML
12.5 SOLUTION INTRAVENOUS AS NEEDED
Status: ACTIVE | OUTPATIENT
Start: 2018-10-20 | End: 2018-10-20

## 2018-10-19 RX ADMIN — CARVEDILOL 25 MG: 12.5 TABLET, FILM COATED ORAL at 17:21

## 2018-10-19 RX ADMIN — HYDRALAZINE HYDROCHLORIDE 100 MG: 50 TABLET, FILM COATED ORAL at 17:21

## 2018-10-19 RX ADMIN — HEPARIN SODIUM 5000 UNITS: 5000 INJECTION, SOLUTION INTRAVENOUS; SUBCUTANEOUS at 09:07

## 2018-10-19 RX ADMIN — CARVEDILOL 25 MG: 12.5 TABLET, FILM COATED ORAL at 11:29

## 2018-10-19 RX ADMIN — PANTOPRAZOLE SODIUM 40 MG: 40 TABLET, DELAYED RELEASE ORAL at 09:07

## 2018-10-19 RX ADMIN — ATORVASTATIN CALCIUM 10 MG: 10 TABLET, FILM COATED ORAL at 20:35

## 2018-10-19 RX ADMIN — SERTRALINE HYDROCHLORIDE 50 MG: 50 TABLET ORAL at 09:07

## 2018-10-19 RX ADMIN — INSULIN LISPRO 2 UNITS: 100 INJECTION, SOLUTION INTRAVENOUS; SUBCUTANEOUS at 20:42

## 2018-10-19 RX ADMIN — POLYETHYLENE GLYCOL 3350 17 G: 17 POWDER, FOR SOLUTION ORAL at 09:07

## 2018-10-19 RX ADMIN — MINERAL OIL, PETROLATUM, PHENYLEPHRINE HCL: 14; 74.9; .25 OINTMENT RECTAL at 22:28

## 2018-10-19 RX ADMIN — SODIUM BICARBONATE 650 MG TABLET 1300 MG: at 09:07

## 2018-10-19 RX ADMIN — Medication 250 MG: at 09:07

## 2018-10-19 RX ADMIN — Medication 250 MG: at 20:35

## 2018-10-19 RX ADMIN — SODIUM BICARBONATE 650 MG TABLET 1300 MG: at 20:35

## 2018-10-19 RX ADMIN — HEPARIN SODIUM 1900 UNITS: 1000 INJECTION, SOLUTION INTRAVENOUS; SUBCUTANEOUS at 10:25

## 2018-10-19 RX ADMIN — HEPARIN SODIUM 5000 UNITS: 5000 INJECTION, SOLUTION INTRAVENOUS; SUBCUTANEOUS at 20:35

## 2018-10-19 RX ADMIN — AMLODIPINE BESYLATE 10 MG: 10 TABLET ORAL at 09:07

## 2018-10-19 RX ADMIN — MINERAL OIL, PETROLATUM, PHENYLEPHRINE HCL: 14; 74.9; .25 OINTMENT RECTAL at 16:21

## 2018-10-19 RX ADMIN — BUDESONIDE AND FORMOTEROL FUMARATE DIHYDRATE 2 PUFF: 160; 4.5 AEROSOL RESPIRATORY (INHALATION) at 20:03

## 2018-10-19 RX ADMIN — ISOSORBIDE MONONITRATE 30 MG: 30 TABLET, EXTENDED RELEASE ORAL at 09:07

## 2018-10-19 RX ADMIN — HYDRALAZINE HYDROCHLORIDE 100 MG: 50 TABLET, FILM COATED ORAL at 09:07

## 2018-10-19 RX ADMIN — EPOETIN ALFA 20000 UNITS: 20000 SOLUTION INTRAVENOUS; SUBCUTANEOUS at 09:07

## 2018-10-19 RX ADMIN — HYDRALAZINE HYDROCHLORIDE 100 MG: 50 TABLET, FILM COATED ORAL at 20:35

## 2018-10-19 NOTE — PLAN OF CARE
Problem: Patient Care Overview  Goal: Plan of Care Review  Outcome: Ongoing (interventions implemented as appropriate)   10/19/18 5599   Coping/Psychosocial   Plan of Care Reviewed With patient   OTHER   Outcome Summary Pt seen bedside due to recent pain and txfr. Attended to some basic ADL's and limited exercise. Continue OT frequency.

## 2018-10-19 NOTE — PROGRESS NOTES
INFECTIOUS DISEASE PROGRESS NOTE    Joy Bueno  1950  [id number]      Admission Date: 10/14/2018      Requesting Provider: No ref. provider found  Evaluating Physician: Velasquez Olivo MD    Reason for Consultation:  ESBL Ecoli  UTI      History of present illness:    10/15/18: Patient is a 68 y.o. female seen today for a UTI.  She was brought from her nursing home with increasing lethargy, and  confusion over the past several days.  She was hospitalized here in August and treated with Invanz for an ESBL Ecoli UTI.  On admission, her urinalysis had too  Numerous to count white cells, and urine culture now with Gram negative rods . She is hypothermic,  With a leukocytosis of 14, 000,  hgb of 7.0, Scr 4.29. Ertapenem was initiated and we were consulted for evaluation and treatment.    10/16/18: She denies nausea and vomiting.  She has decreased dyspnea.  She is scheduled for hemodialysis access by Dr. Calderón.  I discussed her situation with Dr. Calderón today.  10/17/18:  She is now on nasal cannula.  She will  Have her dialysis access later today.  Remains afebrile.   10/18/18: She denies dysuria.  The nursing staff indicates she had approximately 4-5 100 cc of urine out over the day.  She underwent dialysis access placement yesterday.  She has remained afebrile.  10/19/18:  She is currently on HD.  She is more alert this am.  Complaining of hemorrhoids.  She remains afebrile.     Past Medical History:   Diagnosis Date   • Anemia    • Anxiety    • Asthma    • Chronic kidney disease    • Diabetes mellitus (CMS/[facility])    • Diabetes mellitus, type II (CMS/[facility]) 8/3/2018   • History of Clostridium difficile infection 8/3/2018   • History of respiratory failure, since off home oxygen 8/3/2018   • History of UTI 8/3/2018   • Hypertension    • Morbid obesity (CMS/[facility])    • Osteoarthritis    • Tinea capitis 8/3/2018       Past Surgical History:   Procedure Laterality Date   •  SECTION     • COLONOSCOPY N/A  8/29/2017    Procedure: COLONOSCOPY;  Surgeon: Mark I Brunner, MD;  Location:  [facility];  Service:    • ENDOSCOPY N/A 8/25/2017    Procedure: ESOPHAGOGASTRODUODENOSCOPY ;  Surgeon: Velasquez Call MD;  Location:  [facility];  Service:    • HERNIA REPAIR     • INSERTION HEMODIALYSIS CATHETER Right 10/17/2018    Procedure: HEMODIALYSIS CATHETER INSERTION;  Surgeon: Carlos Enrique Calderón MD;  Location:  [facility];  Service: General       Family History   Problem Relation Age of Onset   • Cardiomyopathy Mother    • Stroke Father    • Diabetes Father        Social History     Social History   • Marital status:      Spouse name: N/A   • Number of children: N/A   • Years of education: N/A     Occupational History   • Not on file.     Social History Main Topics   • Smoking status: Former Smoker     Packs/day: 0.25   • Smokeless tobacco: Not on file      Comment: unknown when quit, maybe last year   • Alcohol use No   • Drug use: No   • Sexual activity: No     Other Topics Concern   • Not on file     Social History Narrative    Mrs. Bueno is a 68 year old AA  female. She lives alone in [place] but has been in rehab for some time. She has a daughter. She does not have an advanced directive.       Allergies   Allergen Reactions   • Geodon [Ziprasidone Hcl] Unknown (See Comments)     PT DOESN'T REMEMBER   • Haldol [Haloperidol Lactate] Unknown (See Comments)     PT DOESN'T REMEMBER   • Seroquel [Quetiapine Fumarate] Unknown (See Comments)     PT DOESN'T REMEMBER         Medication:    Current Facility-Administered Medications:   •  acetaminophen (TYLENOL) tablet 650 mg, 650 mg, Oral, Q4H PRN, Doris Heard, APRN, 650 mg at 10/18/18 2485  •  [START ON 10/20/2018] albumin human 25 % IV SOLN 12.5 g, 12.5 g, Intravenous, PRN, Irineo Latham MD  •  albumin human 25 % IV SOLN 25 g, 25 g, Intravenous, PRN, Irineo Latham MD  •  amLODIPine (NORVASC) tablet 10 mg, 10 mg, Oral, Daily,  Doris Heard APRN, 10 mg at 10/19/18 0907  •  atorvastatin (LIPITOR) tablet 10 mg, 10 mg, Oral, Nightly, Doris Heard APRN, 10 mg at 10/18/18 2039  •  budesonide-formoterol (SYMBICORT) 160-4.5 MCG/ACT inhaler 2 puff, 2 puff, Inhalation, BID - RT, Doris Heard APRN, 2 puff at 10/18/18 1857  •  carvedilol (COREG) tablet 25 mg, 25 mg, Oral, BID With Meals, Doris Heard APRN, 25 mg at 10/19/18 1129  •  dextrose (D50W) 25 g/ 50mL Intravenous Solution 25 g, 25 g, Intravenous, Q15 Min PRN, Doris Heard APRN  •  dextrose (GLUTOSE) oral gel 15 g, 15 g, Oral, Q15 Min PRN, Doris Heard APRN  •  epoetin porter (EPOGEN,PROCRIT) injection 20,000 Units, 20,000 Units, Subcutaneous, Once per day on Mon Wed Fri, Rocky Nova MD, 20,000 Units at 10/19/18 0907  •  glucagon (human recombinant) (GLUCAGEN DIAGNOSTIC) injection 1 mg, 1 mg, Subcutaneous, PRN, Doris Heard APRN  •  heparin (porcine) 5000 UNIT/ML injection 5,000 Units, 5,000 Units, Subcutaneous, Q12H, Doris Heard APRN, 5,000 Units at 10/19/18 0907  •  heparin (porcine) injection 1,900 Units, 1,900 Units, Intracatheter, PRN, Irineo Latham MD, 1,900 Units at 10/19/18 1025  •  hydrALAZINE (APRESOLINE) tablet 100 mg, 100 mg, Oral, TID, Doris Heard APRN, 100 mg at 10/19/18 0907  •  hydrocortisone (ANUSOL-HC) 2.5 % rectal cream, , Rectal, BID PRN, Tash Pate APRN  •  Influenza Vac Subunit Quad (FLUCELVAX) injection 0.5 mL, 0.5 mL, Intramuscular, During Hospitalization, Paula Silver MD  •  insulin lispro (humaLOG) injection 0-7 Units, 0-7 Units, Subcutaneous, 4x Daily With Meals & Nightly, Doris Heard APRN, 2 Units at 10/18/18 2038  •  isosorbide mononitrate (IMDUR) 24 hr tablet 30 mg, 30 mg, Oral, Daily, Doris Heard APRN, 30 mg at 10/19/18 0907  •  lactated ringers infusion, 9 mL/hr, Intravenous, Continuous, Jesus Benavides MD, Stopped at 10/17/18 2001  •  ondansetron (ZOFRAN) tablet 4 mg, 4  mg, Oral, Q6H PRN **OR** ondansetron (ZOFRAN) injection 4 mg, 4 mg, Intravenous, Q6H PRN, Doris Heard APRN  •  pantoprazole (PROTONIX) EC tablet 40 mg, 40 mg, Oral, Q AM, Doris Heard APRN, 40 mg at 10/19/18 09  •  phenylephrine-mineral oil-petrolatum 0.25-14-74.9 % hemorhoidal ointment, , Rectal, TID PRN, Sara Krause MD  •  polyethylene glycol 3350 powder (packet), 17 g, Oral, Daily, Doris Heard APRN, 17 g at 10/19/18 0907  •  saccharomyces boulardii (FLORASTOR) capsule 250 mg, 250 mg, Oral, BID, Doris Heard APRN, 250 mg at 10/19/18 0907  •  sertraline (ZOLOFT) tablet 50 mg, 50 mg, Oral, Daily, Doris Heard APRN, 50 mg at 10/19/18 0907  •  sodium bicarbonate tablet 1,300 mg, 1,300 mg, Oral, BID, Rocky Nova MD, 1,300 mg at 10/19/18 0907  •  sodium chloride 0.9 % flush 3 mL, 3 mL, Intravenous, Q12H, Doris Heard APRN, 3 mL at 10/18/18 2122  •  sodium chloride 0.9 % flush 3-10 mL, 3-10 mL, Intravenous, PRN, Doris Heard APRN    Antibiotics:  Anti-Infectives     Ordered     Dose/Rate Route Frequency Start Stop    10/14/18 151  ertapenem (INVanz) 1 g/100 mL 0.9% NS VTB (mbp)     Ordering Provider:  Raheel Brunson PA    1 g  over 30 Minutes Intravenous Once 10/14/18 1514 10/14/18 1701            Review of Systems:  No reliable ROS from patient     Physical Exam:   Vital Signs  Temp (24hrs), Av.5 °F (36.4 °C), Min:96 °F (35.6 °C), Max:98.1 °F (36.7 °C)    Temp  Min: 96 °F (35.6 °C)  Max: 98.1 °F (36.7 °C)  BP  Min: 141/67  Max: 207/87  Pulse  Min: 69  Max: 109  Resp  Min: 20  Max: 21  SpO2  Min: 98 %  Max: 99 %    GENERAL alert .  She is in no acute distress..   HEENT: Normocephalic, atraumatic.  PERRL. EOMI. No conjunctival injection. No icterus. Oropharynx clear without evidence of thrush or exudate.   NECK: Supple without nuchal rigidity. No mass.  LYMPH: No cervical, axillary or inguinal lymphadenopathy.  HEART: RRR; No murmur, rubs, gallops.    LUNGS:  Few rhonchi  Anteriorly   ABDOMEN: Soft, nontender,firm,  bowel sounds quiet  No rebound or guarding. NO mass or HSM.  EXT:  No cyanosis, clubbing or edema. No cord.  : Genitalia generally unremarkable. purewick in place   MSK: FROM without joint effusions noted arms/legs.    SKIN: Warm and dry  Scalp eczema,   Neuro:  Alert  Right Rian site ok.    Laboratory Data      Results from last 7 days  Lab Units 10/19/18  0659 10/17/18  0543 10/16/18  1936 10/16/18  0802 10/15/18  0633   WBC 10*3/mm3 12.97*  --   --  12.46* 11.79*   HEMOGLOBIN g/dL 8.6* 9.0* 6.8* 6.9* 5.1*   HEMATOCRIT % 27.3* 29.0* 22.9* 22.8* 23.8*   PLATELETS 10*3/mm3 205  --   --  210 194       Results from last 7 days  Lab Units 10/19/18  0659   SODIUM mmol/L 142   POTASSIUM mmol/L 4.0   CHLORIDE mmol/L 111*   CO2 mmol/L 22.0   BUN mg/dL 56*   CREATININE mg/dL 3.34*   GLUCOSE mg/dL 103*   CALCIUM mg/dL 8.6*       Results from last 7 days  Lab Units 10/18/18  0638   ALK PHOS U/L 81   BILIRUBIN mg/dL 0.3   BILIRUBIN DIRECT mg/dL 0.1   ALT (SGPT) U/L 13   AST (SGOT) U/L 12           UA  TNTC WBCS              Estimated Creatinine Clearance: 22.9 mL/min (A) (by C-G formula based on SCr of 3.34 mg/dL (H)).      Microbiology:  Blood Culture   Date Value Ref Range Status   10/14/2018 No growth at less than 24 hours  Preliminary   10/14/2018 No growth at less than 24 hours  Preliminary                    Urine Culture   Date Value Ref Range Status   10/14/2018 >100,000 CFU/mL Gram Negative Bacilli (A)  Preliminary      Urine culture is growing ESBL Klebsiella        Radiology:  Imaging Results (last 72 hours)     Procedure Component Value Units Date/Time    XR Chest 2 View [[id number]] Collected:  10/14/18 1443     Updated:  10/14/18 1653    Narrative:          EXAMINATION: XR CHEST 2 VW - 10/14/2018     INDICATION: Lethargy.      COMPARISON: None.     FINDINGS: Two-view chest reveals the heart to be enlarged. Underlying  chronic changes seen  throughout the lung fields bilaterally. No evidence  of acute parenchymal disease. Pulmonary vascularity is pronounced. No  focal parenchymal opacification present. Degenerative change is seen  within the spine.           Impression:       Low lung volumes with chronic changes seen within the lung  fields, enlargement heart and no evidence of acute parenchymal disease.     DICTATED:   10/14/2018  EDITED/ls :   10/14/2018      This report was finalized on 10/14/2018 4:51 PM by Dr. Rosalie Kline MD.       CT Head Without Contrast [[id number]] Collected:  10/14/18 1441     Updated:  10/14/18 1652    Narrative:       EXAMINATION: CT HEAD WO CONTRAST - 10/12/2018     INDICATION: Lethargy, weakness.     TECHNIQUE: Multiple axial CT imaging is obtained of the head from skull  base to skull vertex without the administration of intravenous contrast.     The radiation dose reduction device was turned on for each scan per the  ALARA (As Low as Reasonably Achievable) protocol.     COMPARISON: None.     FINDINGS: The parenchyma is grossly unremarkable. Some low-density area  is seen in the periventricular and subcortical white matter suggesting  chronic small vessel ischemic change. There is no hemorrhage or  hydrocephalus. No mass, mass effect or midline shift. Bony structures  reveal no evidence of osseous abnormality. There is mucosal thickening  of the left maxillary sinus and ethmoid air cells. Globes and orbits are  intact. The mastoid air cells are patent.       Impression:       No acute intracranial abnormality with chronic changes seen  within the brain.     DICTATED:   10/14/2018  EDITED/ls :   10/14/2018      This report was finalized on 10/14/2018 4:50 PM by Dr. Rosalie Kline MD.               Impression:   1.  ESBL Klebsiella UTI   2.  Anemia-severe.   3.  Acute/chronic renal failure/ATN   4.  History of Cdiff-she will remain at risk for relapse of C. difficile colitis due to the need to treat her  UTI.  5.  Diabetes Mellitus, type 2-this places her at increased risk for recurrent infections  6.  Encephalopathy-toxic metabolic-this may be secondary to her UTI    PLAN/RECOMMENDATIONS:   1. Monitor off antibiotics   2. Discharge soon      Dr. Olivo has obtained the history, performed the physical exam and formulated the above treatment plan.     Velasquez Olivo MD  10/19/2018  4:28 PM

## 2018-10-19 NOTE — PROGRESS NOTES
I have reviewed the chart, appropriate imaging, and labs.  I have again discussed the risks and benefits of left upper extremity forearm loop arteriovenous fistula creation, brachiobrachial position with 5-6 mm artegraft with the patient.  All of their questions have been answered.  She understands the risks and benefits of the procedure, including but not limited to: bleeding, infection, injury to adjacent viscera (nerves, vessels, muscle), permanent deficits, a steal syndrome, non-maturation, chronic pain, need for re-intervention, and medical issues from a cardiopulmonary and deep venous thrombosis standpoint.  She understands and wishes to proceed.  I'm trying to schedule this for Monday afternoon if the OR apparently has availability and will let me scheduled an inpatient for an operation.  I am waiting to hear back from inpatient scheduling for theoperating room regarding this patient.

## 2018-10-19 NOTE — PROGRESS NOTES
"                  Clinical Nutrition     Nutrition Assessment  Reason for Visit:   [facility]      Patient Name: Joy Bueno  YOB: 1950  MRN: [id number]  Date of Encounter: 10/19/18 3:10 PM  Admission date: 10/14/2018        Nutrition Assessment   Assessment       Admission diagnosis   Severe sepsis (CMS/HCC)  Metabolic encephalopathy  UTI      Applicable diagnosis/PMH  Hypertension  NATALIO/ ESRD (end stage renal disease) (CMS/[facility])  HD   Leukocytosis   UTI (urinary tract infection)    Chronic heart failure (CMS/HCC)    Diabetes mellitus, type II (CMS/[facility])    Anemia of chronic renal failure, stage 5 (CMS/[facility])    Hyperkalemia        PMH: She  has a past medical history of Anemia; Anxiety; Asthma; Chronic kidney disease; Diabetes mellitus (CMS/[facility]); Diabetes mellitus, type II (CMS/HCC) (8/3/2018); History of Clostridium difficile infection (8/3/2018); History of respiratory failure, since off home oxygen (8/3/2018); History of UTI (8/3/2018); Hypertension; Morbid obesity (CMS/[facility]); Osteoarthritis; and Tinea capitis (8/3/2018).   PSxH: She  has a past surgical history that includes Hernia repair;  section; Esophagogastroduodenoscopy (N/A, 2017); Colonoscopy (N/A, 2017); and Hemodialysis Catheter (Right, 10/17/2018).       Reported/Observed/Food/Nutrition Related History:      HD today. Pt states she had been eating okay however it has been less in the past couple days due to not feeling well. Pt agreeable to nutrition supplements.       Anthropometrics     Height: 165.1 cm (65\")  Last filed wt: Weight: 136 kg (300 lb) (10/14/18 1215)   no method listed    BMI: BMI (Calculated): 49.9  Obese Class III extreme obesity: > or equal to 40kg/m2    Ideal Body Weight (IBW) (kg): 57.29      Labs reviewed       Results from last 7 days  Lab Units 10/19/18  0659 10/18/18  0638 10/16/18  0802 10/15/18  0625 10/14/18  1329   GLUCOSE mg/dL 103* 110* 147* 82 96   BUN mg/dL 56* 78* 78* 77* 73*   CREATININE " mg/dL 3.34* 4.50* 4.42* 4.33* 4.29*   SODIUM mmol/L 142 142 143 144 144   CHLORIDE mmol/L 111* 113* 115* 115* 119*   POTASSIUM mmol/L 4.0 5.0 5.6* 5.8* 5.9*   PHOSPHORUS mg/dL 4.1 5.8* 6.4*  --   --    MAGNESIUM mg/dL  --   --  1.5 1.3  --    ALT (SGPT) U/L  --  13  --   --  14       Results from last 7 days  Lab Units 10/19/18  0659 10/18/18  0638 10/16/18  0802   ALBUMIN g/dL 3.09* 3.20 3.12*           Results from last 7 days  Lab Units 10/19/18  1131 10/19/18  0720 10/18/18  2157 10/18/18  2017 10/18/18  1809 10/18/18  1132   GLUCOSE mg/dL 115 104 162* 176* 184* 121       Lab Results  Lab Value Date/Time   HGBA1C 5.40 10/15/2018 0625   HGBA1C 6.80 (H) 08/03/2018 0417         Medications reviewed       Current Nutrition Prescription     PO: Diet Regular; Consistent Carbohydrate      Intake: 57% x 6 meals           Nutrition Diagnosis     10/19  Problem Inadequate oral intake    Etiology Poor appetite    Signs/Symptoms 57% x 6 meals        Nutrition Intervention   1.  Follow treatment progress, Care plan reviewed  2. Supplement provided Boost GC BID      Goal:   General: Nutrition support treatment  PO: Increase intake    Monitoring/Evaluation:   Per protocol, I&O, PO intake, Supplement intake      Will Continue to follow per protocol      Swapna Somers RDN, LD  Time Spent: 30min

## 2018-10-19 NOTE — THERAPY TREATMENT NOTE
Acute Care - Physical Therapy Treatment Note  [facility]     Patient Name: Joy Bueno  : 1950  MRN: [id number]  Today's Date: 10/19/2018  Onset of Illness/Injury or Date of Surgery: 10/14/18  Date of Referral to PT: 10/14/18  Referring Physician: ethan    Admit Date: 10/14/2018    Visit Dx:    ICD-10-CM ICD-9-CM   1. Acute UTI N39.0 599.0   2. History of ESBL Klebsiella pneumoniae infection Z86.19 V12.09   3. Acute renal failure superimposed on chronic kidney disease, unspecified CKD stage, unspecified acute renal failure type (CMS/HCC) N17.9 584.9    N18.9 585.9   4. Anemia of chronic disease D63.8 285.29   5. Impaired functional mobility, balance, gait, and endurance Z74.09 V49.89   6. Impaired mobility and ADLs Z74.09 799.89     Patient Active Problem List   Diagnosis   • Asthma   • Hypertension   • Symptomatic anemia   • Morbid obesity (CMS/[facility])   • ESRD (end stage renal disease) (CMS/[facility])   • Anxiety   • Anemia   • CKD (chronic kidney disease) Stage 3   • Gastritis   • Pleural effusion, right   • Leukocytosis   • Possible pneumonia of RML/RLL   • Clostridium difficile diarrhea   • UTI (urinary tract infection)   • Bilateral leg pain   • Generalized weakness   • Abdominal pain   • Chronic heart failure (CMS/[facility])   • Anemia in stage 4 chronic kidney disease (CMS/[facility])   • CKD (chronic kidney disease) stage 4, GFR 15-29 ml/min (CMS/[facility])   • Diabetes mellitus, type II (CMS/[facility])   • History of UTI   • History of Clostridium difficile infection   • Tinea capitis   • History of respiratory failure, since off home oxygen   • Anemia of chronic renal failure, stage 5 (CMS/HCC)   • Hyperkalemia   • Metabolic encephalopathy   • Severe sepsis (CMS/[facility])   • Sepsis (CMS/[facility])       Therapy Treatment          Rehabilitation Treatment Summary     Row Name 10/19/18 1415 10/19/18 1330          Treatment Time/Intention    Discipline physical therapist  -MITALI occupational therapist  -AN     Document Type therapy  note (daily note)  -MITALI therapy note (daily note)  -AN     Subjective Information complains of;weakness  -MITALI complains of;pain  -AN     Mode of Treatment physical therapy  -MITALI occupational therapy  -AN     Patient/Family Observations  -- pt. supine in specialty bed  -AN     Care Plan Review care plan/treatment goals reviewed;risks/benefits reviewed;patient/other agree to care plan  -MITALI care plan/treatment goals reviewed;risks/benefits reviewed  -AN     Therapy Frequency (PT Clinical Impression) daily  -MITALI  --     Patient Effort adequate  -MITALI good  -AN     Comment  -- RN reports pt just back to bed, but ok to work bedside  -AN     Existing Precautions/Restrictions fall;oxygen therapy device and L/min  -MITALI  --     Recorded by [MITALI] Dianna Jiang, PT 10/19/18 1604 [AN] Sasha Dover, OT 10/19/18 1428     Row Name 10/19/18 1415 10/19/18 1330          Cognitive Assessment/Intervention- PT/OT    Affect/Mental Status (Cognitive) WFL  -MITALI WFL  -AN     Orientation Status (Cognition) oriented to;person;place  -MITALI oriented to;person;place  -AN     Follows Commands (Cognition) follows one step commands;over 90% accuracy  -MITALI follows one step commands;75-90% accuracy  -AN     Cognitive Function (Cognitive) safety deficit  -MITALI  --     Safety Deficit (Cognitive) awareness of need for assistance;insight into deficits/self awareness;safety precautions awareness;safety precautions follow-through/compliance  -MITALI  --     Personal Safety Interventions fall prevention program maintained;gait belt;nonskid shoes/slippers when out of bed  -MITALI  --     Recorded by [MITALI] Dianna Jiang, PT 10/19/18 1604 [AN] Sasha Dover, OT 10/19/18 1428     Row Name 10/19/18 1415             Safety Issues, Functional Mobility    Safety Issues Affecting Function (Mobility) awareness of need for assistance;insight into deficits/self awareness;safety precaution awareness;safety precautions follow-through/compliance  -MITALI      Impairments Affecting  Function (Mobility) endurance/activity tolerance;pain;strength;range of motion (ROM)  -MITALI      Recorded by [MITALI] Dianna Jiang, PT 10/19/18 1604      Row Name 10/19/18 1415 10/19/18 1330          Bed Mobility Assessment/Treatment    Rolling Left [place] (Bed Mobility) maximum assist (25% patient effort);2 person assist  -MITALI  --     Rolling Right [place] (Bed Mobility) maximum assist (25% patient effort);2 person assist  -MITALI  --     Scooting/Bridging [place] (Bed Mobility) dependent (less than 25% patient effort)  -MITALI  --     Bed Mobility, Safety Issues decreased use of arms for pushing/pulling;decreased use of legs for bridging/pushing  -MITALI  --     Assistive Device (Bed Mobility) draw sheet  -MITALI  --     Comment (Bed Mobility)  -- deferred  -AN     Recorded by [MITALI] Dianna Jiang, PT 10/19/18 1604 [AN] Sasha Dover, OT 10/19/18 1428     Row Name 10/19/18 1415 10/19/18 1330          Transfer Assessment/Treatment    Comment (Transfers) deferred patient just back to bed per RN  -MITALI deferred  -AN     Recorded by [MITALI] Dianna Jiang, PT 10/19/18 1604 [AN] Sasha Dover, OT 10/19/18 1428     Row Name 10/19/18 1415             Gait/Stairs Assessment/Training    Comment (Gait/Stairs) patient is non ambulatory  -MITALI      Recorded by [MITALI] Dianna Jiang, PT 10/19/18 1604      Row Name 10/19/18 1330             Grooming Assessment/Training    [place] Level (Grooming) wash face, hands;oral care regimen;minimum assist (75% patient effort)  -AN      Grooming Position sitting up in bed  -AN      Recorded by [AN] Sasha Dover, OT 10/19/18 1428      Row Name 10/19/18 1415 10/19/18 1330          Motor Skills Assessment/Interventions    Additional Documentation Therapeutic Exercise Interventions (Group)  -MITALI Therapeutic Exercise (Group)  -AN     Recorded by [MITALI] Dianna Jiang, PT 10/19/18 1604 [AN] Sasha Dover, OT 10/19/18 1428     Row Name 10/19/18 1415 10/19/18 1330          Therapeutic  Exercise    Therapeutic Exercise supine, upper extremities;supine, lower extremities  -MITALI  --     Additional Documentation Therapeutic Exercise (Row)  -MITALI  --     07170 - PT Therapeutic Exercise Minutes 14  -MITALI  --     90927 - OT Therapeutic Activity Minutes  -- 8  -AN     Recorded by [MITALI] Dianna Jiang, PT 10/19/18 1604 [AN] Sasha Dover, OT 10/19/18 1428     Row Name 10/19/18 1415             Upper Extremity Supine Therapeutic Exercise    Performed, Supine Upper Extremity (Therapeutic Exercise) shoulder flexion/extension;shoulder abduction/adduction;elbow flexion/extension  -MITALI      Exercise Type, Supine Upper Extremity (Therapeutic Exercise) AAROM (active assistive range of motion)  -MITALI      Sets/Reps Detail, Supine Upper Extremity (Therapeutic Exercise) 1/10  -MITALI      Recorded by [MITALI] Dianna Jiang, PT 10/19/18 1604      Row Name 10/19/18 1415             Lower Extremity Supine Therapeutic Exercise    Performed, Supine Lower Extremity (Therapeutic Exercise) hip flexion/extension;hip abduction/adduction;knee flexion/extension;ankle dorsiflexion/plantarflexion;ankle pumps;heel slides  -MITALI      Exercise Type, Supine Lower Extremity (Therapeutic Exercise) AAROM (active assistive range of motion)  -MITALI      Sets/Reps Detail, Supine Lower Extremity (Therapeutic Exercise) 1/10  -MITALI      Recorded by [MITALI] Dianna Jiang, PT 10/19/18 1604      Row Name 10/19/18 1330             Therapeutic Exercise    Upper Extremity Range of Motion (Therapeutic Exercise) elbow flexion/extension, bilateral;forearm supination/pronation, bilateral;wrist flexion/extension, bilateral;shoulder horizontal abduction/adduction, bilateral  -AN      Sets/Reps (Therapeutic Exercise) 1/8-10  -AN      Recorded by [AN] Sasha Dover, OT 10/19/18 1428      Row Name 10/19/18 1415 10/19/18 1330          Positioning and Restraints    Pre-Treatment Position in bed  -MITALI in bed  -AN     Post Treatment Position bed  -MITALI bed  -AN     In Bed  notified nsg;supine;call light within reach;exit alarm on  -MITALI supine;call light within reach;encouraged to call for assist  -AN     Recorded by [MITALI] Dianna Jiang, PT 10/19/18 1604 [AN] Sasha Dover, OT 10/19/18 1428     Row Name 10/19/18 1415 10/19/18 1330          Pain Scale: Numbers Pre/Post-Treatment    Pain Scale: Numbers, Pretreatment 0/10 - no pain  -MITALI 6/10  -AN     Pain Scale: Numbers, Post-Treatment 3/10  -MITALI 6/10  -AN     Pain Location - Side Right  -MITALI Bilateral  -AN     Pain Location - Orientation upper  -MITALI  --     Pain Location extremity  -MITALI perineum  -AN     Pain Intervention(s) Repositioned  -MITALI --   notified RN  -AN     Recorded by [MITALI] Dianna Jiang, PT 10/19/18 1604 [AN] Sasha Dover, OT 10/19/18 1428     Row Name                Wound 10/17/18 1855 Other (See comments) chest incision    Wound - Properties Group Date first assessed: 10/17/18 [SS] Time first assessed: 1855 [SS] Side: Other (See comments) [SS] Location: chest [SS] Type: incision [SS] Recorded by:  [SS] Donna Dejesus RN 10/17/18 1855    Row Name 10/19/18 1415             Coping    Observed Emotional State cooperative  -MITALI      Verbalized Emotional State acceptance  -MITALI      Recorded by [MITALI] Dianna Jaing, PT 10/19/18 1604      Row Name 10/19/18 1415 10/19/18 1330          Plan of Care Review    Plan of Care Reviewed With patient  -MITALI patient  -AN     Recorded by [MITALI] Dianna Jiang, PT 10/19/18 1604 [AN] Sasha Dover, OT 10/19/18 1428     Row Name 10/19/18 1330             Outcome Summary/Treatment Plan (OT)    Daily Summary of Progress (OT) progress toward functional goals as expected  -AN      Recorded by [AN] Sasha Dover, OT 10/19/18 1428      Row Name 10/19/18 1415             Outcome Summary/Treatment Plan (PT)    Daily Summary of Progress (PT) progress toward functional goals is gradual  -MITALI      Anticipated Discharge Disposition (PT) skilled nursing facility  -MITALI      Recorded by [MITALI]  Dianna Jiang, PT 10/19/18 1604        User Key  (r) = Recorded By, (t) = Taken By, (c) = Cosigned By    Initials Name Effective Dates Discipline    MITALI Dianna Jiang, PT 06/19/15 -  PT    Sasha Fry, OT 06/22/15 -  OT    Donna Justice, RN 01/15/18 -  --          Wound 10/17/18 1855 Other (See comments) chest incision (Active)   Dressing Appearance intact;dried drainage 10/19/2018 10:59 AM   Closure Adhesive bandage 10/19/2018 10:59 AM   Drainage Amount scant 10/18/2018  8:00 PM   Dressing Care, Wound antimicrobial agent applied 10/18/2018  8:00 PM             Physical Therapy Education     Title: PT OT SLP Therapies (Active)     Topic: Physical Therapy (Active)     Point: Mobility training (Active)    Learning Progress Summary     Learner Status Readiness Method Response Comment Documented by    Patient Active Acceptance E NR  MITALI 10/19/18 1415     Active Acceptance E NR  KR 10/15/18 1312          Point: Home exercise program (Active)    Learning Progress Summary     Learner Status Readiness Method Response Comment Documented by    Patient Active Acceptance E NR  MITALI 10/19/18 1415     Active Acceptance E NR  KR 10/15/18 1312          Point: Body mechanics (Active)    Learning Progress Summary     Learner Status Readiness Method Response Comment Documented by    Patient Active Acceptance E NR  MITALI 10/19/18 1415     Active Acceptance E NR  KR 10/15/18 1312          Point: Precautions (Active)    Learning Progress Summary     Learner Status Readiness Method Response Comment Documented by    Patient Active Acceptance E NR  MITALI 10/19/18 1415     Active Acceptance E NR  KR 10/15/18 1312                      User Key     Initials Effective Dates Name Provider Type Discipline    MITALI 06/19/15 -  Dianna Jiang, PT Physical Therapist PT    KR 04/03/18 -  Tessa aNpier, PT Physical Therapist PT                    PT Recommendation and Plan  Anticipated Discharge Disposition (PT): skilled nursing  facility  Therapy Frequency (PT Clinical Impression): daily  Outcome Summary/Treatment Plan (PT)  Daily Summary of Progress (PT): progress toward functional goals is gradual  Anticipated Discharge Disposition (PT): skilled nursing facility  Plan of Care Reviewed With: patient  Outcome Summary: patient seen in bed do to fatigue from dialysis she performed all UE and LE exercise with assist           Outcome Measures     Row Name 10/19/18 1415 10/19/18 1330          How much help from another person do you currently need...    Turning from your back to your side while in flat bed without using bedrails? 2  -MITALI  --     Moving from lying on back to sitting on the side of a flat bed without bedrails? 1  -MITALI  --     Moving to and from a bed to a chair (including a wheelchair)? 1  -MITALI  --     Standing up from a chair using your arms (e.g., wheelchair, bedside chair)? 1  -MITALI  --     Climbing 3-5 steps with a railing? 1  -MITALI  --     To walk in hospital room? 1  -MITALI  --     AM-PAC 6 Clicks Score 7  -MITALI  --        How much help from another is currently needed...    Putting on and taking off regular lower body clothing?  -- 1  -AN     Bathing (including washing, rinsing, and drying)  -- 2  -AN     Toileting (which includes using toilet bed pan or urinal)  -- 1  -AN     Putting on and taking off regular upper body clothing  -- 2  -AN     Taking care of personal grooming (such as brushing teeth)  -- 3  -AN     Eating meals  -- 3  -AN     Score  -- 12  -AN       User Key  (r) = Recorded By, (t) = Taken By, (c) = Cosigned By    Initials Name Provider Type    Dianna Mendoza, PT Physical Therapist    Sasha Fry, OT Occupational Therapist           Time Calculation:         PT Charges     Row Name 10/19/18 1415             Time Calculation    Start Time 1415  -MITALI      PT Received On 10/19/18  -MITALI      PT Goal Re-Cert Due Date 10/25/18  -MITALI         Time Calculation- PT    Total Timed Code Minutes- PT 14 minute(s)  -MITALI          Timed Charges    89661 - PT Therapeutic Exercise Minutes 14  -MITALI        User Key  (r) = Recorded By, (t) = Taken By, (c) = Cosigned By    Initials Name Provider Type    Dianna Mendoza, PT Physical Therapist        Therapy Suggested Charges     Code   Minutes Charges    89191 (CPT®) Hc Pt Neuromusc Re Education Ea 15 Min      23924 (CPT®) Hc Pt Ther Proc Ea 15 Min 14 1    88472 (CPT®) Hc Gait Training Ea 15 Min      16099 (CPT®) Hc Pt Therapeutic Act Ea 15 Min      30276 (CPT®) Hc Pt Manual Therapy Ea 15 Min      98670 (CPT®) Hc Pt Iontophoresis Ea 15 Min      21215 (CPT®) Hc Pt Elec Stim Ea-Per 15 Min      30954 (CPT®) Hc Pt Ultrasound Ea 15 Min      90541 (CPT®) Hc Pt Self Care/Mgmt/Train Ea 15 Min      68360 (CPT®) Hc Pt Prosthetic (S) Train Initial Encounter, Each 15 Min      91199 (CPT®) Hc Pt Orthotic(S)/Prosthetic(S) Encounter, Each 15 Min      18507 (CPT®) Hc Orthotic(S) Mgmt/Train Initial Encounter, Each 15min      Total  14 1        Therapy Charges for Today     Code Description Service Date Service Provider Modifiers Qty    01336150117 HC PT THER PROC EA 15 MIN 10/19/2018 Dianna Jiang, PT GP 1          PT G-Codes  Outcome Measure Options: AM-PAC 6 Clicks Daily Activity (OT)  AM-PAC 6 Clicks Score: 7  Score: 12    Dianna Jiang, PT  10/19/2018

## 2018-10-19 NOTE — PLAN OF CARE
Problem: Patient Care Overview  Goal: Plan of Care Review  Outcome: Ongoing (interventions implemented as appropriate)   10/19/18 0147   Coping/Psychosocial   Plan of Care Reviewed With patient   Plan of Care Review   Progress improving   OTHER   Outcome Summary Patient had multiple BMs, complains of hemorrhoid pain, hydrocortisone cream applied, dialysis scheduled for this AM.     Goal: Individualization and Mutuality  Outcome: Ongoing (interventions implemented as appropriate)   10/19/18 0147   Individualization   Patient Specific Interventions Call light within reach       Problem: Fall Risk (Adult)  Goal: Identify Related Risk Factors and Signs and Symptoms  Outcome: Ongoing (interventions implemented as appropriate)   10/19/18 0147   Fall Risk (Adult)   Related Risk Factors (Fall Risk) gait/mobility problems;bladder function altered   Signs and Symptoms (Fall Risk) presence of risk factors     Goal: Absence of Fall  Outcome: Ongoing (interventions implemented as appropriate)   10/19/18 0147   Fall Risk (Adult)   Absence of Fall making progress toward outcome       Problem: Skin Injury Risk (Adult)  Goal: Identify Related Risk Factors and Signs and Symptoms  Outcome: Ongoing (interventions implemented as appropriate)   10/19/18 0147   Skin Injury Risk (Adult)   Related Risk Factors (Skin Injury Risk) body weight extremes;mobility impaired     Goal: Skin Health and Integrity  Outcome: Ongoing (interventions implemented as appropriate)   10/19/18 0147   Skin Injury Risk (Adult)   Skin Health and Integrity making progress toward outcome       Problem: Anemia (Adult)  Goal: Identify Related Risk Factors and Signs and Symptoms  Outcome: Ongoing (interventions implemented as appropriate)   10/19/18 0147   Anemia (Adult)   Related Risk Factors (Anemia) chronic illness   Signs and Symptoms (Anemia) fatigue     Goal: Symptom Improvement  Outcome: Ongoing (interventions implemented as appropriate)   10/19/18 0147   Anemia  (Adult)   Symptom Improvement making progress toward outcome       Problem: Hemodialysis (Adult)  Goal: Signs and Symptoms of Listed Potential Problems Will be Absent, Minimized or Managed (Hemodialysis)  Outcome: Ongoing (interventions implemented as appropriate)   10/19/18 0147   Goal/Outcome Evaluation   Problems Assessed (Hemodialysis) all   Problems Present (Hemodialysis) situational response

## 2018-10-19 NOTE — PROGRESS NOTES
Continued Stay Note  [facility]     Patient Name: Joy Bueno  MRN: [id number]  Today's Date: 10/19/2018    Admit Date: 10/14/2018          Discharge Plan     Row Name 10/19/18 1630       Plan    Plan SNF     Patient/Family in Agreement with Plan yes    Plan Comments TREVOR spoke with Saloni with [facility]. Saloni reports that they don't have a bed this weekend but will keep pt on the list for next week and follow up on Monday with TREVOR. TREVOR then called [employer] to verify pt has transportation with them [phone]. Pt is elligible and set up with [employer] [facility] so would be able ot use to get to and from dialysis from SNF. TREVOR followed up with [facility] [phone]. Pt's [facility]  has been assigned and it is [place]. TREVOR spoke with [place] who reports she is working on it and will fax pt's chair time to start on Wednesday 10/24 as soon as she can.    Final Discharge Disposition Code 03 - skilled nursing facility (SNF)              Discharge Codes    No documentation.           ROGELIO Maloney

## 2018-10-19 NOTE — PLAN OF CARE
Problem: Patient Care Overview  Goal: Plan of Care Review  Outcome: Ongoing (interventions implemented as appropriate)   10/19/18 5709   Coping/Psychosocial   Plan of Care Reviewed With patient   OTHER   Outcome Summary patient seen in bed do to fatigue from dialysis she performed all UE and LE exercise with assist

## 2018-10-19 NOTE — THERAPY TREATMENT NOTE
Acute Care - Occupational Therapy Treatment Note  [facility]     Patient Name: Joy Bueno  : 1950  MRN: [id number]  Today's Date: 10/19/2018  Onset of Illness/Injury or Date of Surgery: 10/14/18  Date of Referral to OT: 10/14/18  Referring Physician: ethan    Admit Date: 10/14/2018       ICD-10-CM ICD-9-CM   1. Acute UTI N39.0 599.0   2. History of ESBL Klebsiella pneumoniae infection Z86.19 V12.09   3. Acute renal failure superimposed on chronic kidney disease, unspecified CKD stage, unspecified acute renal failure type (CMS/[facility]) N17.9 584.9    N18.9 585.9   4. Anemia of chronic disease D63.8 285.29   5. Impaired functional mobility, balance, gait, and endurance Z74.09 V49.89   6. Impaired mobility and ADLs Z74.09 799.89     Patient Active Problem List   Diagnosis   • Asthma   • Hypertension   • Symptomatic anemia   • Morbid obesity (CMS/[facility])   • ESRD (end stage renal disease) (CMS/[facility])   • Anxiety   • Anemia   • CKD (chronic kidney disease) Stage 3   • Gastritis   • Pleural effusion, right   • Leukocytosis   • Possible pneumonia of RML/RLL   • Clostridium difficile diarrhea   • UTI (urinary tract infection)   • Bilateral leg pain   • Generalized weakness   • Abdominal pain   • Chronic heart failure (CMS/[facility])   • Anemia in stage 4 chronic kidney disease (CMS/[facility])   • CKD (chronic kidney disease) stage 4, GFR 15-29 ml/min (CMS/[facility])   • Diabetes mellitus, type II (CMS/[facility])   • History of UTI   • History of Clostridium difficile infection   • Tinea capitis   • History of respiratory failure, since off home oxygen   • Anemia of chronic renal failure, stage 5 (CMS/[facility])   • Hyperkalemia   • Metabolic encephalopathy   • Severe sepsis (CMS/[facility])   • Sepsis (CMS/[facility])     Past Medical History:   Diagnosis Date   • Anemia    • Anxiety    • Asthma    • Chronic kidney disease    • Diabetes mellitus (CMS/[facility])    • Diabetes mellitus, type II (CMS/[facility]) 8/3/2018   • History of Clostridium difficile infection  8/3/2018   • History of respiratory failure, since off home oxygen 8/3/2018   • History of UTI 8/3/2018   • Hypertension    • Morbid obesity (CMS/HCC)    • Osteoarthritis    • Tinea capitis 8/3/2018     Past Surgical History:   Procedure Laterality Date   •  SECTION     • COLONOSCOPY N/A 2017    Procedure: COLONOSCOPY;  Surgeon: Mark I Brunner, MD;  Location:  [facility];  Service:    • ENDOSCOPY N/A 2017    Procedure: ESOPHAGOGASTRODUODENOSCOPY ;  Surgeon: Velasquez Call MD;  Location:  [facility];  Service:    • HERNIA REPAIR     • INSERTION HEMODIALYSIS CATHETER Right 10/17/2018    Procedure: HEMODIALYSIS CATHETER INSERTION;  Surgeon: Carlos Enrique Calderón MD;  Location:  [facility];  Service: General       Therapy Treatment          Rehabilitation Treatment Summary     Row Name 10/19/18 1330             Treatment Time/Intention    Discipline occupational therapist  -AN      Document Type therapy note (daily note)  -AN      Subjective Information complains of;pain  -AN      Mode of Treatment occupational therapy  -AN      Patient/Family Observations pt. supine in specialty bed  -AN      Care Plan Review care plan/treatment goals reviewed;risks/benefits reviewed  -AN      Patient Effort good  -AN      Comment RN reports pt just back to bed, but ok to work bedside  -AN      Recorded by [AN] Sasha Dover OT 10/19/18 1428      Row Name 10/19/18 1330             Cognitive Assessment/Intervention- PT/OT    Affect/Mental Status (Cognitive) WFL  -AN      Orientation Status (Cognition) oriented to;person;place  -AN      Follows Commands (Cognition) follows one step commands;75-90% accuracy  -AN      Recorded by [AN] Sasha Dover OT 10/19/18 1428      Row Name 10/19/18 1330             Bed Mobility Assessment/Treatment    Comment (Bed Mobility) deferred  -AN      Recorded by [AN] Sasha Dover OT 10/19/18 142      Row Name 10/19/18 133             Transfer Assessment/Treatment     Comment (Transfers) deferred  -AN      Recorded by [AN] Sasha Dover, OT 10/19/18 1428      Row Name 10/19/18 1330             Grooming Assessment/Training    [place] Level (Grooming) wash face, hands;oral care regimen;minimum assist (75% patient effort)  -AN      Grooming Position sitting up in bed  -AN      Recorded by [AN] Sasha Dover, OT 10/19/18 1428      Row Name 10/19/18 1330             Motor Skills Assessment/Interventions    Additional Documentation Therapeutic Exercise (Group)  -AN      Recorded by [AN] Sasha Dover, OT 10/19/18 1428      Row Name 10/19/18 1330             Therapeutic Exercise    85607 - OT Therapeutic Activity Minutes 8  -AN      Recorded by [AN] Sasha Dover, OT 10/19/18 1428      Row Name 10/19/18 1330             Therapeutic Exercise    Upper Extremity Range of Motion (Therapeutic Exercise) elbow flexion/extension, bilateral;forearm supination/pronation, bilateral;wrist flexion/extension, bilateral;shoulder horizontal abduction/adduction, bilateral  -AN      Sets/Reps (Therapeutic Exercise) 1/8-10  -AN      Recorded by [AN] Sasha Dover, OT 10/19/18 1428      Row Name 10/19/18 1330             Positioning and Restraints    Pre-Treatment Position in bed  -AN      Post Treatment Position bed  -AN      In Bed supine;call light within reach;encouraged to call for assist  -AN      Recorded by [AN] Sasha Dover, OT 10/19/18 1428      Row Name 10/19/18 1330             Pain Scale: Numbers Pre/Post-Treatment    Pain Scale: Numbers, Pretreatment 6/10  -AN      Pain Scale: Numbers, Post-Treatment 6/10  -AN      Pain Location - Side Bilateral  -AN      Pain Location perineum  -AN      Pain Intervention(s) --   notified RN  -AN      Recorded by [AN] Sasha Dover, OT 10/19/18 1428      Row Name                Wound 10/17/18 1855 Other (See comments) chest incision    Wound - Properties Group Date first assessed: 10/17/18 [SS] Time first assessed: 1855 [SS] Side: Other (See  comments) [SS] Location: chest [SS] Type: incision [SS] Recorded by:  [SS] Donna Dejesus RN 10/17/18 1855    Row Name 10/19/18 1330             Plan of Care Review    Plan of Care Reviewed With patient  -AN      Recorded by [AN] Sasha Dover OT 10/19/18 1428      Row Name 10/19/18 1330             Outcome Summary/Treatment Plan (OT)    Daily Summary of Progress (OT) progress toward functional goals as expected  -AN      Recorded by [AN] Sasha Dover OT 10/19/18 1428        User Key  (r) = Recorded By, (t) = Taken By, (c) = Cosigned By    Initials Name Effective Dates Discipline    AN Sasha Dover OT 06/22/15 -  OT    Donna Justice RN 01/15/18 -  --        Wound 10/17/18 1855 Other (See comments) chest incision (Active)   Dressing Appearance intact;dried drainage 10/19/2018 10:59 AM   Closure Adhesive bandage 10/19/2018 10:59 AM   Drainage Amount scant 10/18/2018  8:00 PM   Dressing Care, Wound antimicrobial agent applied 10/18/2018  8:00 PM         Occupational Therapy Education     Title: PT OT SLP Therapies (Active)     Topic: Occupational Therapy (Done)     Point: ADL training (Done)     Description: Instruct learner(s) on proper safety adaptation and remediation techniques during self care or transfers.   Instruct in proper use of assistive devices.   Learning Progress Summary     Learner Status Readiness Method Response Comment Documented by    Patient Done Acceptance E JOVITA MOSES Educated on benefits of therapeutic ex and activities. AN 10/19/18 1429     Done Acceptance E JOVITA MOSES Education initiated for benefits of OOB activity/therapy, role of OT, and HEP TB 10/16/18 1435          Point: Home exercise program (Done)     Description: Instruct learner(s) on appropriate technique for monitoring, assisting and/or progressing therapeutic exercises/activities.   Learning Progress Summary     Learner Status Readiness Method Response Comment Documented by    Patient Done Acceptance E AURELIANR  Educated on benefits of therapeutic ex and activities. AN 10/19/18 1429     Done Acceptance E VU,NR Education initiated for benefits of OOB activity/therapy, role of OT, and HEP TB 10/16/18 1435                      User Key     Initials Effective Dates Name Provider Type Discipline    TB 06/08/18 -  Aranza Manzano, OT Occupational Therapist OT    AN 06/22/15 -  Sasha Dover, OT Occupational Therapist OT                OT Recommendation and Plan  Outcome Summary/Treatment Plan (OT)  Daily Summary of Progress (OT): progress toward functional goals as expected  Daily Summary of Progress (OT): progress toward functional goals as expected  Plan of Care Review  Plan of Care Reviewed With: patient  Plan of Care Reviewed With: patient  Outcome Summary: Pt seen bedside due to recent pain and txfr. Attended to some basic ADL's and limited exercise. Continue OT frequency.         Outcome Measures     Row Name 10/19/18 1330             How much help from another is currently needed...    Putting on and taking off regular lower body clothing? 1  -AN      Bathing (including washing, rinsing, and drying) 2  -AN      Toileting (which includes using toilet bed pan or urinal) 1  -AN      Putting on and taking off regular upper body clothing 2  -AN      Taking care of personal grooming (such as brushing teeth) 3  -AN      Eating meals 3  -AN      Score 12  -AN        User Key  (r) = Recorded By, (t) = Taken By, (c) = Cosigned By    Initials Name Provider Type    AN Sasha Dover, OT Occupational Therapist           Time Calculation:         Time Calculation- OT     Row Name 10/19/18 1431 10/19/18 1330          Time Calculation- OT    OT Start Time 1330  -AN  --     Total Timed Code Minutes- OT 8 minute(s)  -AN  --     OT Received On 10/19/18  -AN  --     OT Goal Re-Cert Due Date 10/26/18  -AN  --        Timed Charges    88472 - OT Therapeutic Activity Minutes  -- 8  -AN       User Key  (r) = Recorded By, (t) = Taken By,  (c) = Cosigned By    Initials Name Provider Type    Sasha Fry OT Occupational Therapist           Therapy Suggested Charges     Code   Minutes Charges    87666 (CPT®) Hc Ot Neuromusc Re Education Ea 15 Min      86372 (CPT®) Hc Ot Ther Proc Ea 15 Min      10817 (CPT®) Hc Ot Therapeutic Act Ea 15 Min 8 1    03456 (CPT®) Hc Ot Manual Therapy Ea 15 Min      80294 (CPT®) Hc Ot Iontophoresis Ea 15 Min      36955 (CPT®) Hc Ot Elec Stim Ea-Per 15 Min      64156 (CPT®) Hc Ot Ultrasound Ea 15 Min      32308 (CPT®) Hc Ot Self Care/Mgmt/Train Ea 15 Min      Total  8 1        Therapy Charges for Today     Code Description Service Date Service Provider Modifiers Qty    05889763790 HC OT THERAPEUTIC ACT EA 15 MIN 10/19/2018 Sasha Dover OT GO 1               Sasha Dover OT  10/19/2018

## 2018-10-19 NOTE — PROGRESS NOTES
"   LOS: 5 days    Patient Care Team:  Provider, No Known as PCP - General    Reason For Visit:  Diabetic nephropathy biopsy proven, stage V CK D, no requiring dialysis.  Tunnel catheter place 10/17/2018.  Dialysis started 10/18/2018, patient tolerating well  Subjective   Patient seen on dialysis.  Tolerating well.        Review of Systems:    Patient denies shortness of breath, chest pain, dysuria, hematuria, nausea, vomiting.        Objective       amLODIPine 10 mg Oral Daily   atorvastatin 10 mg Oral Nightly   budesonide-formoterol 2 puff Inhalation BID - RT   carvedilol 25 mg Oral BID With Meals   epoetin porter 20,000 Units Subcutaneous Once per day on Mon Wed Fri   heparin (porcine) 5,000 Units Subcutaneous Q12H   hydrALAZINE 100 mg Oral TID   insulin lispro 0-7 Units Subcutaneous 4x Daily With Meals & Nightly   isosorbide mononitrate 30 mg Oral Daily   pantoprazole 40 mg Oral Q AM   Patiromer Sorbitex Calcium 16.8 g Oral Once   polyethylene glycol 17 g Oral Daily   saccharomyces boulardii 250 mg Oral BID   sertraline 50 mg Oral Daily   sodium bicarbonate 1,300 mg Oral BID   sodium chloride 3 mL Intravenous Q12H       lactated ringers 9 mL/hr Last Rate: Stopped (10/17/18 2001)         Vital Signs:  Blood pressure (!) 181/87, pulse 69, temperature 98.1 °F (36.7 °C), temperature source Oral, resp. rate 20, height 165.1 cm (65\"), weight 136 kg (300 lb), SpO2 99 %.    Flowsheet Rows      First Filed Value   Admission Height  165.1 cm (65\") Documented at 10/14/2018 1215   Admission Weight  136 kg (300 lb) Documented at 10/14/2018 1215          10/18 0701 - 10/19 0700  In: 740 [P.O.:740]  Out: 3160 [Urine:850]    Physical Exam:  General Appearance: Alert, oriented, no obvious distress.  Eyes: PER, EOMI.  Neck: Supple no JVD.  Lungs: Clear auscultation, no rales rhonchi's, equal chest movement, nonlabored.  Heart: No gallop, murmur, rub, RRR.  Abdomen: Soft, nontender, positive bowel sounds, no " organomegaly.  Extremities: positive edema , no cyanosis.  Neuro: No focal deficit, moving all extremities, alert oriented×3  Neck: Tunnel catheter right side noted.  No sign of  infection or bleeding        Labs:    Results from last 7 days  Lab Units 10/19/18  0659 10/17/18  0543 10/16/18  1936 10/16/18  0802 10/15/18  0633   WBC 10*3/mm3 12.97*  --   --  12.46* 11.79*   HEMOGLOBIN g/dL 8.6* 9.0* 6.8* 6.9* 5.1*   HEMATOCRIT % 27.3* 29.0* 22.9* 22.8* 23.8*   PLATELETS 10*3/mm3 205  --   --  210 194       Results from last 7 days  Lab Units 10/19/18  0659 10/18/18  0638 10/16/18  0802 10/15/18  0625 10/14/18  1329   SODIUM mmol/L 142 142 143 144 144   POTASSIUM mmol/L 4.0 5.0 5.6* 5.8* 5.9*   CHLORIDE mmol/L 111* 113* 115* 115* 119*   CO2 mmol/L 22.0 20.0 20.0 20.0 18.0*   BUN mg/dL 56* 78* 78* 77* 73*   CREATININE mg/dL 3.34* 4.50* 4.42* 4.33* 4.29*   CALCIUM mg/dL 8.6* 8.7 8.2* 8.2* 8.7   PHOSPHORUS mg/dL 4.1 5.8* 6.4*  --   --    MAGNESIUM mg/dL  --   --  1.5 1.3  --    ALBUMIN g/dL 3.09* 3.20 3.12*  --  3.37       Results from last 7 days  Lab Units 10/19/18  0659   GLUCOSE mg/dL 103*         Results from last 7 days  Lab Units 10/18/18  0638   ALK PHOS U/L 81   BILIRUBIN mg/dL 0.3   BILIRUBIN DIRECT mg/dL 0.1   ALT (SGPT) U/L 13   AST (SGOT) U/L 12       Results from last 7 days  Lab Units 10/14/18  1851   PH, ARTERIAL pH units 7.186*   PO2 ART mm Hg 127.0*   PCO2, ARTERIAL mm Hg 48.1*   HCO3 ART mmol/L 18.2*         Results from last 7 days  Lab Units 10/14/18  1340   COLOR UA  Yellow   CLARITY UA  Turbid*   PH, URINE  6.0   SPECIFIC GRAVITY, URINE  1.020   GLUCOSE UA  Negative   KETONES UA  Negative   BILIRUBIN UA  Negative   PROTEIN UA  100 mg/dL (2+)*   BLOOD UA  Small (1+)*   LEUKOCYTES UA  Large (3+)*   NITRITE UA  Negative       Estimated Creatinine Clearance: 22.9 mL/min (A) (by C-G formula based on SCr of 3.34 mg/dL (H)).      Assessment      1.  Diabetic nephropathy biopsy proven: Now end-stage renal  disease requiring dialysis.  2.  Hyperkalemia.  Corrected with dialysis.  3.  Anemia of chronic disease.  4.  Diabetes type 2.  5.  UTI: Severe sepsis,.  6.  Metabolic encephalopathy improving.  7.volume overload    Plan dialysis again tomorrow.  We will try to get outpatient dialysis.  Home with outpatient dialysis is arranged.           Irineo Latham MD  10/19/18  8:43 AM

## 2018-10-19 NOTE — PROGRESS NOTES
"    [facility] Medicine Services  PROGRESS NOTE    Patient Name: Joy Bueno  : 1950  MRN: [id number]    Date of Admission: 10/14/2018  Length of Stay: 5  Primary Care Physician: Provider, No Known    Subjective   Subjective     CC: sepsis    HPI:  Pt doing well this am, she was seen immediately after HD. Complains of hemorrhoidal pain (unalleviated by topicals ordered overnight) and \"head cold\"/nasal congestion.  Otherwise she is doing well.     Review of Systems  Gen- No fevers, chills  CV- No chest pain, palpitations  Resp- No cough, dyspnea  GI- No N/V/D, abd pain    Otherwise ROS is negative except as mentioned in the HPI.    Objective   Objective     Vital Signs:   Temp:  [96 °F (35.6 °C)-98.1 °F (36.7 °C)] 97.6 °F (36.4 °C)  Heart Rate:  [] 73  Resp:  [20-21] 20  BP: (141-207)/(62-96) 145/72       Physical Exam:  Constitutional: chronically ill female, in no acute distress, awake, alert  HENT: NCAT, mucous membranes moist  Respiratory: non labored respirations, no wheezes or rhonchi   Cardiovascular: RRR, no murmurs, rubs, or gallops, palpable pedal pulses bilaterally  Gastrointestinal: Obese, positive bowel sounds, soft, nontender, nondistended  Musculoskeletal: No bilateral ankle edema  Psychiatric: Appropriate affect, cooperative  Neurologic: Oriented x 3, no focal deficits  Skin: No rashes    Results Reviewed:  I have personally reviewed current lab, radiology, and data and agree.      Results from last 7 days  Lab Units 10/19/18  0659 10/17/18  0543 10/16/18  1936 10/16/18  0802 10/15/18  0633   WBC 10*3/mm3 12.97*  --   --  12.46* 11.79*   HEMOGLOBIN g/dL 8.6* 9.0* 6.8* 6.9* 5.1*   HEMATOCRIT % 27.3* 29.0* 22.9* 22.8* 23.8*   PLATELETS 10*3/mm3 205  --   --  210 194       Results from last 7 days  Lab Units 10/19/18  0659 10/18/18  0638 10/16/18  0802  10/14/18  1329   SODIUM mmol/L 142 142 143  < > 144   POTASSIUM mmol/L 4.0 5.0 5.6*  < > 5.9*   CHLORIDE mmol/L " 111* 113* 115*  < > 119*   CO2 mmol/L 22.0 20.0 20.0  < > 18.0*   BUN mg/dL 56* 78* 78*  < > 73*   CREATININE mg/dL 3.34* 4.50* 4.42*  < > 4.29*   GLUCOSE mg/dL 103* 110* 147*  < > 96   CALCIUM mg/dL 8.6* 8.7 8.2*  < > 8.7   ALT (SGPT) U/L  --  13  --   --  14   AST (SGOT) U/L  --  12  --   --  15   < > = values in this interval not displayed.  Estimated Creatinine Clearance: 22.9 mL/min (A) (by C-G formula based on SCr of 3.34 mg/dL (H)).  No results found for: BNP    Microbiology Results Abnormal     Procedure Component Value - Date/Time    Blood Culture - Blood, [[id number]]  (Normal) Collected:  10/14/18 1606    Lab Status:  Preliminary result Specimen:  Blood from Arm, Right Updated:  10/18/18 1630     Blood Culture No growth at 4 days    Blood Culture - Blood, [[id number]]  (Normal) Collected:  10/14/18 1606    Lab Status:  Preliminary result Specimen:  Blood from Arm, Left Updated:  10/18/18 1630     Blood Culture No growth at 4 days    Urine Culture - Urine, [[id number]]  (Abnormal)  (Susceptibility) Collected:  10/14/18 1340    Lab Status:  Final result Specimen:  Urine from Urine, Catheter Updated:  10/16/18 1403     Urine Culture >100,000 CFU/mL Klebsiella pneumoniae ESBL (C)     Comment:   Consider infectious disease consult.  Susceptibility results may not correlate to clinical outcomes.       Susceptibility      Klebsiella pneumoniae ESBL     AL     Ciprofloxacin >2 ug/ml Resistant     Ertapenem <=1 ug/ml Susceptible     Gentamicin <=4 ug/ml Susceptible     Levofloxacin >4 ug/ml Resistant     Meropenem <=1 ug/ml Susceptible     Nitrofurantoin 64 ug/ml Intermediate     Piperacillin + Tazobactam <=16 ug/ml Susceptible     Tetracycline >8 ug/ml Resistant     Tobramycin >8 ug/ml Resistant     Trimethoprim + Sulfamethoxazole >2/38 ug/ml Resistant                          Imaging Results (last 24 hours)     ** No results found for the last 24 hours. **        Results for orders placed during the hospital  encounter of 09/02/17   Adult Transthoracic Echo Complete    Narrative · Left ventricular wall thickness is consistent with mild concentric   hypertrophy.  · Left atrial cavity size is mildly dilated.  · Mild mitral valve regurgitation is present  · calcification of the aortic valve  · Mild tricuspid valve regurgitation is present.          I have reviewed the medications.      amLODIPine 10 mg Oral Daily   atorvastatin 10 mg Oral Nightly   budesonide-formoterol 2 puff Inhalation BID - RT   carvedilol 25 mg Oral BID With Meals   epoetin porter 20,000 Units Subcutaneous Once per day on Mon Wed Fri   heparin (porcine) 5,000 Units Subcutaneous Q12H   hydrALAZINE 100 mg Oral TID   insulin lispro 0-7 Units Subcutaneous 4x Daily With Meals & Nightly   isosorbide mononitrate 30 mg Oral Daily   pantoprazole 40 mg Oral Q AM   Patiromer Sorbitex Calcium 16.8 g Oral Once   polyethylene glycol 17 g Oral Daily   saccharomyces boulardii 250 mg Oral BID   sertraline 50 mg Oral Daily   sodium bicarbonate 1,300 mg Oral BID   sodium chloride 3 mL Intravenous Q12H         Assessment/Plan   Assessment / Plan     Active Hospital Problems    Diagnosis   • **Severe sepsis (CMS/HCC)   • Hyperkalemia   • Metabolic encephalopathy   • Anemia of chronic renal failure, stage 5 (CMS/HCC)   • Diabetes mellitus, type II (CMS/HCC)   • Chronic heart failure (CMS/HCC)   • UTI (urinary tract infection)   • Leukocytosis   • Hypertension   • ESRD (end stage renal disease) (CMS/HCC)     Renal Biopsy 08/10/17: Diabetic neuropathy, mild to moderate atherosclerosis           Brief Hospital Course to date:  Joy Bueno is a 68 y.o. female past medical history of type 2 diabetes, significant stage IV, chronic heart failure, hypertension.  Presents to [facility], which place with complaints for 5 days of lethargy, decreased by mouth intake, difficult to arouse.  Here patient has been admitted with sepsis and acute metabolic encephalopathy likely secondary  to UTI.     Assessment & Plan:  - Severe sepsis (POA)  likely secondary to ESBL Klebsiella UTI improving, blood cultures NGTD.  Monitor off ABX per ID (has completed Invanz)   - Acute encephalopathy- resolving, likely multifactorial sec to uremia vs sepsis  - ESRD likely sec to hypertensive nephropathy she is s/p tunneled cath placement 10/17, renal following started HD, needs AVF creation.  - Suspected decompensated HF with elevated BNP and hypoxia, continue O2. HD alleviating volume overload.  Next HD tomorrow am.   - Previously well controlled T2DM AC1 6.8% (8/3), repeat is 5.4% suspect these are falsely low sec to multiple blood transfusions, continue ssi  - Anemia stable, no si/sx of acute blood loss, suspect ACD, continue to monitor, s/p 3 unit PRBC transfuse, continue to monitor  - Repeat labs in AM, cbc, bmp     DVT Prophylaxis:  [facility]    CODE STATUS:   Code Status and Medical Interventions:   Ordered at: 10/14/18 1942     Level Of Support Discussed With:    Patient     Code Status:    CPR     Medical Interventions (Level of Support Prior to Arrest):    Full       Disposition: Home after HD tomorrow? Per NAL, outpatient HD has been arranged.     Electronically signed by Sara Krause MD, 10/19/18, 12:26 PM.

## 2018-10-20 ENCOUNTER — APPOINTMENT (OUTPATIENT)
Dept: NEPHROLOGY | Facility: HOSPITAL | Age: 68
End: 2018-10-20
Attending: INTERNAL MEDICINE

## 2018-10-20 LAB
ANION GAP SERPL CALCULATED.3IONS-SCNC: 6 MMOL/L (ref 3–11)
BUN BLD-MCNC: 39 MG/DL (ref 9–23)
BUN/CREAT SERPL: 15.8 (ref 7–25)
CALCIUM SPEC-SCNC: 8.4 MG/DL (ref 8.7–10.4)
CHLORIDE SERPL-SCNC: 110 MMOL/L (ref 99–109)
CO2 SERPL-SCNC: 25 MMOL/L (ref 20–31)
CREAT BLD-MCNC: 2.47 MG/DL (ref 0.6–1.3)
DEPRECATED RDW RBC AUTO: 56 FL (ref 37–54)
ERYTHROCYTE [DISTWIDTH] IN BLOOD BY AUTOMATED COUNT: 16.3 % (ref 11.3–14.5)
GFR SERPL CREATININE-BSD FRML MDRD: 24 ML/MIN/1.73
GLUCOSE BLD-MCNC: 141 MG/DL (ref 70–100)
GLUCOSE BLDC GLUCOMTR-MCNC: 130 MG/DL (ref 70–130)
GLUCOSE BLDC GLUCOMTR-MCNC: 156 MG/DL (ref 70–130)
GLUCOSE BLDC GLUCOMTR-MCNC: 286 MG/DL (ref 70–130)
GLUCOSE BLDC GLUCOMTR-MCNC: 98 MG/DL (ref 70–130)
HCT VFR BLD AUTO: 27 % (ref 34.5–44)
HGB BLD-MCNC: 8.5 G/DL (ref 11.5–15.5)
MCH RBC QN AUTO: 29.6 PG (ref 27–31)
MCHC RBC AUTO-ENTMCNC: 31.5 G/DL (ref 32–36)
MCV RBC AUTO: 94.1 FL (ref 80–99)
PLATELET # BLD AUTO: 197 10*3/MM3 (ref 150–450)
PMV BLD AUTO: 10.4 FL (ref 6–12)
POTASSIUM BLD-SCNC: 3.6 MMOL/L (ref 3.5–5.5)
RBC # BLD AUTO: 2.87 10*6/MM3 (ref 3.89–5.14)
SODIUM BLD-SCNC: 141 MMOL/L (ref 132–146)
TROPONIN I SERPL-MCNC: 0.02 NG/ML
WBC NRBC COR # BLD: 13.95 10*3/MM3 (ref 3.5–10.8)

## 2018-10-20 PROCEDURE — 84484 ASSAY OF TROPONIN QUANT: CPT | Performed by: NURSE PRACTITIONER

## 2018-10-20 PROCEDURE — 99232 SBSQ HOSP IP/OBS MODERATE 35: CPT | Performed by: NURSE PRACTITIONER

## 2018-10-20 PROCEDURE — 5A1D70Z PERFORMANCE OF URINARY FILTRATION, INTERMITTENT, LESS THAN 6 HOURS PER DAY: ICD-10-PCS | Performed by: INTERNAL MEDICINE

## 2018-10-20 PROCEDURE — 25010000002 HEPARIN (PORCINE) PER 1000 UNITS: Performed by: NURSE PRACTITIONER

## 2018-10-20 PROCEDURE — 94799 UNLISTED PULMONARY SVC/PX: CPT

## 2018-10-20 PROCEDURE — 94760 N-INVAS EAR/PLS OXIMETRY 1: CPT

## 2018-10-20 PROCEDURE — 25010000002 HEPARIN (PORCINE) PER 1000 UNITS: Performed by: INTERNAL MEDICINE

## 2018-10-20 PROCEDURE — 93010 ELECTROCARDIOGRAM REPORT: CPT | Performed by: INTERNAL MEDICINE

## 2018-10-20 PROCEDURE — 85027 COMPLETE CBC AUTOMATED: CPT | Performed by: INTERNAL MEDICINE

## 2018-10-20 PROCEDURE — 94640 AIRWAY INHALATION TREATMENT: CPT

## 2018-10-20 PROCEDURE — 93005 ELECTROCARDIOGRAM TRACING: CPT | Performed by: NURSE PRACTITIONER

## 2018-10-20 PROCEDURE — 80048 BASIC METABOLIC PNL TOTAL CA: CPT | Performed by: INTERNAL MEDICINE

## 2018-10-20 PROCEDURE — 82962 GLUCOSE BLOOD TEST: CPT

## 2018-10-20 RX ADMIN — SODIUM BICARBONATE 650 MG TABLET 1300 MG: at 20:51

## 2018-10-20 RX ADMIN — HEPARIN SODIUM 5000 UNITS: 5000 INJECTION, SOLUTION INTRAVENOUS; SUBCUTANEOUS at 09:31

## 2018-10-20 RX ADMIN — HEPARIN SODIUM 5000 UNITS: 5000 INJECTION, SOLUTION INTRAVENOUS; SUBCUTANEOUS at 20:51

## 2018-10-20 RX ADMIN — HYDRALAZINE HYDROCHLORIDE 100 MG: 50 TABLET, FILM COATED ORAL at 16:22

## 2018-10-20 RX ADMIN — CARVEDILOL 25 MG: 12.5 TABLET, FILM COATED ORAL at 20:10

## 2018-10-20 RX ADMIN — HEPARIN SODIUM 1900 UNITS: 1000 INJECTION, SOLUTION INTRAVENOUS; SUBCUTANEOUS at 09:33

## 2018-10-20 RX ADMIN — POLYETHYLENE GLYCOL 3350 17 G: 17 POWDER, FOR SOLUTION ORAL at 09:31

## 2018-10-20 RX ADMIN — PANTOPRAZOLE SODIUM 40 MG: 40 TABLET, DELAYED RELEASE ORAL at 06:23

## 2018-10-20 RX ADMIN — ISOSORBIDE MONONITRATE 30 MG: 30 TABLET, EXTENDED RELEASE ORAL at 09:32

## 2018-10-20 RX ADMIN — ATORVASTATIN CALCIUM 10 MG: 10 TABLET, FILM COATED ORAL at 20:51

## 2018-10-20 RX ADMIN — SODIUM BICARBONATE 650 MG TABLET 1300 MG: at 09:32

## 2018-10-20 RX ADMIN — HYDRALAZINE HYDROCHLORIDE 100 MG: 50 TABLET, FILM COATED ORAL at 20:51

## 2018-10-20 RX ADMIN — Medication 250 MG: at 09:34

## 2018-10-20 RX ADMIN — INSULIN LISPRO 2 UNITS: 100 INJECTION, SOLUTION INTRAVENOUS; SUBCUTANEOUS at 12:27

## 2018-10-20 RX ADMIN — BUDESONIDE AND FORMOTEROL FUMARATE DIHYDRATE 2 PUFF: 160; 4.5 AEROSOL RESPIRATORY (INHALATION) at 19:40

## 2018-10-20 RX ADMIN — HYDRALAZINE HYDROCHLORIDE 100 MG: 50 TABLET, FILM COATED ORAL at 09:33

## 2018-10-20 RX ADMIN — Medication 250 MG: at 20:51

## 2018-10-20 RX ADMIN — SERTRALINE HYDROCHLORIDE 50 MG: 50 TABLET ORAL at 09:32

## 2018-10-20 RX ADMIN — BUDESONIDE AND FORMOTEROL FUMARATE DIHYDRATE 2 PUFF: 160; 4.5 AEROSOL RESPIRATORY (INHALATION) at 11:54

## 2018-10-20 RX ADMIN — ACETAMINOPHEN 650 MG: 325 TABLET ORAL at 23:12

## 2018-10-20 RX ADMIN — AMLODIPINE BESYLATE 10 MG: 10 TABLET ORAL at 09:33

## 2018-10-20 RX ADMIN — CARVEDILOL 25 MG: 12.5 TABLET, FILM COATED ORAL at 09:32

## 2018-10-20 RX ADMIN — INSULIN LISPRO 4 UNITS: 100 INJECTION, SOLUTION INTRAVENOUS; SUBCUTANEOUS at 17:51

## 2018-10-20 NOTE — PLAN OF CARE
Problem: Patient Care Overview  Goal: Plan of Care Review  Outcome: Ongoing (interventions implemented as appropriate)   10/20/18 0140   Coping/Psychosocial   Plan of Care Reviewed With patient   Plan of Care Review   Progress improving   OTHER   Outcome Summary Pt RA, continues to complain of hemorrhoid pain, pt occasionally confused, reoriented, Pt will have dialysis this AM     Goal: Individualization and Mutuality  Outcome: Ongoing (interventions implemented as appropriate)   10/20/18 0140   Individualization   Patient Specific Interventions Monitor pain q2hr and prn, ensure call light within reach.       Problem: Fall Risk (Adult)  Goal: Identify Related Risk Factors and Signs and Symptoms  Outcome: Ongoing (interventions implemented as appropriate)   10/20/18 0140   Fall Risk (Adult)   Related Risk Factors (Fall Risk) gait/mobility problems   Signs and Symptoms (Fall Risk) presence of risk factors     Goal: Absence of Fall  Outcome: Ongoing (interventions implemented as appropriate)   10/20/18 0140   Fall Risk (Adult)   Absence of Fall making progress toward outcome       Problem: Skin Injury Risk (Adult)  Goal: Identify Related Risk Factors and Signs and Symptoms  Outcome: Ongoing (interventions implemented as appropriate)   10/20/18 0140   Skin Injury Risk (Adult)   Related Risk Factors (Skin Injury Risk) body weight extremes     Goal: Skin Health and Integrity  Outcome: Ongoing (interventions implemented as appropriate)   10/20/18 0140   Skin Injury Risk (Adult)   Skin Health and Integrity making progress toward outcome       Problem: Anemia (Adult)  Goal: Identify Related Risk Factors and Signs and Symptoms  Outcome: Ongoing (interventions implemented as appropriate)   10/20/18 0140   Anemia (Adult)   Related Risk Factors (Anemia) chronic illness   Signs and Symptoms (Anemia) fatigue     Goal: Symptom Improvement  Outcome: Ongoing (interventions implemented as appropriate)   10/20/18 0140   Anemia (Adult)    Symptom Improvement making progress toward outcome       Problem: Hemodialysis (Adult)  Goal: Signs and Symptoms of Listed Potential Problems Will be Absent, Minimized or Managed (Hemodialysis)  Outcome: Ongoing (interventions implemented as appropriate)   10/20/18 0140   Goal/Outcome Evaluation   Problems Assessed (Hemodialysis) all   Problems Present (Hemodialysis) situational response

## 2018-10-20 NOTE — PROGRESS NOTES
[facility] Medicine Services  PROGRESS NOTE    Patient Name: Joy Bueno  : 1950  MRN: [id number]    Date of Admission: 10/14/2018  Length of Stay: 6  Primary Care Physician: Provider, No Known    Subjective   Subjective     CC: Hospital follow up for sepsis    HPI:    Resting comfortably in bed in dialysis this morning.  No overnight issues.  States that she is really sleepy this morning.  Ate a decent amount of her breakfast.       Review of Systems  Gen- No fevers, chills  CV- No chest pain, palpitations  Resp- No cough, dyspnea  GI- No N/V/D, abd pain    Otherwise ROS is negative except as mentioned in the HPI.    Objective   Objective     Vital Signs:   Temp:  [96 °F (35.6 °C)-98 °F (36.7 °C)] 96.7 °F (35.9 °C)  Heart Rate:  [] 67  Resp:  [20-22] 20  BP: (139-207)/(70-92) 139/71       Physical Exam:  Constitutional: chronically ill female, in no acute distress, asleep but arouses to voice, resting in bed.   HENT: NCAT, mucous membranes moist  Respiratory: non labored respirations, no wheezes or rhonchi on room air  Cardiovascular: RRR, no murmurs, rubs, or gallops, palpable pedal pulses bilaterally  Gastrointestinal: Obese, positive bowel sounds, soft, nontender, nondistended  Musculoskeletal: No bilateral ankle edema  Psychiatric: Appropriate affect, cooperative  Neurologic: Oriented x 3, no focal deficits  Skin: No rashes    Results Reviewed:  I have personally reviewed current lab, radiology, and data and agree.      Results from last 7 days  Lab Units 10/20/18  0236 10/19/18  0659 10/17/18  0543  10/16/18  0802   WBC 10*3/mm3 13.95* 12.97*  --   --  12.46*   HEMOGLOBIN g/dL 8.5* 8.6* 9.0*  < > 6.9*   HEMATOCRIT % 27.0* 27.3* 29.0*  < > 22.8*   PLATELETS 10*3/mm3 197 205  --   --  210   < > = values in this interval not displayed.    Results from last 7 days  Lab Units 10/20/18  0236 10/19/18  0659 10/18/18  0638  10/14/18  1329   SODIUM mmol/L 141 142 142  < >  144   POTASSIUM mmol/L 3.6 4.0 5.0  < > 5.9*   CHLORIDE mmol/L 110* 111* 113*  < > 119*   CO2 mmol/L 25.0 22.0 20.0  < > 18.0*   BUN mg/dL 39* 56* 78*  < > 73*   CREATININE mg/dL 2.47* 3.34* 4.50*  < > 4.29*   GLUCOSE mg/dL 141* 103* 110*  < > 96   CALCIUM mg/dL 8.4* 8.6* 8.7  < > 8.7   ALT (SGPT) U/L  --   --  13  --  14   AST (SGOT) U/L  --   --  12  --  15   TROPONIN I ng/mL 0.019  --   --   --   --    < > = values in this interval not displayed.  Estimated Creatinine Clearance: 30.9 mL/min (A) (by C-G formula based on SCr of 2.47 mg/dL (H)).  No results found for: BNP    Microbiology Results Abnormal     Procedure Component Value - Date/Time    Blood Culture - Blood, [[id number]]  (Normal) Collected:  10/14/18 1606    Lab Status:  Final result Specimen:  Blood from Arm, Right Updated:  10/19/18 1630     Blood Culture No growth at 5 days    Blood Culture - Blood, [[id number]]  (Normal) Collected:  10/14/18 1606    Lab Status:  Final result Specimen:  Blood from Arm, Left Updated:  10/19/18 1630     Blood Culture No growth at 5 days    Urine Culture - Urine, [[id number]]  (Abnormal)  (Susceptibility) Collected:  10/14/18 1340    Lab Status:  Final result Specimen:  Urine from Urine, Catheter Updated:  10/16/18 1403     Urine Culture >100,000 CFU/mL Klebsiella pneumoniae ESBL (C)     Comment:   Consider infectious disease consult.  Susceptibility results may not correlate to clinical outcomes.       Susceptibility      Klebsiella pneumoniae ESBL     AL     Ciprofloxacin >2 ug/ml Resistant     Ertapenem <=1 ug/ml Susceptible     Gentamicin <=4 ug/ml Susceptible     Levofloxacin >4 ug/ml Resistant     Meropenem <=1 ug/ml Susceptible     Nitrofurantoin 64 ug/ml Intermediate     Piperacillin + Tazobactam <=16 ug/ml Susceptible     Tetracycline >8 ug/ml Resistant     Tobramycin >8 ug/ml Resistant     Trimethoprim + Sulfamethoxazole >2/38 ug/ml Resistant                          Imaging Results (last 24 hours)     ** No  results found for the last 24 hours. **        Results for orders placed during the hospital encounter of 09/02/17   Adult Transthoracic Echo Complete    Narrative · Left ventricular wall thickness is consistent with mild concentric   hypertrophy.  · Left atrial cavity size is mildly dilated.  · Mild mitral valve regurgitation is present  · calcification of the aortic valve  · Mild tricuspid valve regurgitation is present.          I have reviewed the medications.      amLODIPine 10 mg Oral Daily   atorvastatin 10 mg Oral Nightly   budesonide-formoterol 2 puff Inhalation BID - RT   carvedilol 25 mg Oral BID With Meals   epoetin porter 20,000 Units Subcutaneous Once per day on Mon Wed Fri   heparin (porcine) 5,000 Units Subcutaneous Q12H   hydrALAZINE 100 mg Oral TID   insulin lispro 0-7 Units Subcutaneous 4x Daily With Meals & Nightly   isosorbide mononitrate 30 mg Oral Daily   pantoprazole 40 mg Oral Q AM   polyethylene glycol 17 g Oral Daily   saccharomyces boulardii 250 mg Oral BID   sertraline 50 mg Oral Daily   sodium bicarbonate 1,300 mg Oral BID   sodium chloride 3 mL Intravenous Q12H         Assessment/Plan   Assessment / Plan     Active Hospital Problems    Diagnosis   • **Severe sepsis (CMS/[facility])   • Hypertension   • ESRD (end stage renal disease) (CMS/[facility])     Renal Biopsy 08/10/17: Diabetic neuropathy, mild to moderate atherosclerosis        • Hyperkalemia   • Metabolic encephalopathy   • Anemia of chronic renal failure, stage 5 (CMS/[facility])   • Diabetes mellitus, type II (CMS/[facility])   • Chronic heart failure (CMS/[facility])   • UTI (urinary tract infection)   • Leukocytosis      Brief Hospital Course to date:  Joy Bueno is a 68 y.o. female past medical history of type 2 diabetes, significant stage IV, chronic heart failure, hypertension.  Presents to [facility], which place with complaints for 5 days of lethargy, decreased by mouth intake, difficult to arouse.  Here patient has been admitted with sepsis and  acute metabolic encephalopathy likely secondary to UTI.     Assessment & Plan:  - Severe sepsis (POA)  likely secondary to ESBL Klebsiella UTI improving, blood cultures NGTD.  Monitor off ABX per ID (has completed Invanz)   - Acute encephalopathy- appears resolved, likely multifactorial sec to uremia vs sepsis  - ESRD likely sec to hypertensive nephropathy she is s/p tunneled cath placement 10/17, renal following started HD, needs AVF creation.  - Suspected decompensated HF with elevated BNP and hypoxia, continue O2. HD alleviating volume overload.    - Previously well controlled T2DM AC1 6.8% (8/3), repeat is 5.4% suspect these are falsely low sec to multiple blood transfusions, continue ssi  - Anemia stable, no si/sx of acute blood loss, suspect ACD, continue to monitor, s/p 3 unit PRBC transfuse, continue to monitor     DVT Prophylaxis:  [facility]    CODE STATUS:   Code Status and Medical Interventions:   Ordered at: 10/14/18 1942     Level Of Support Discussed With:    Patient     Code Status:    CPR     Medical Interventions (Level of Support Prior to Arrest):    Full       Disposition: Transferring to [facility], likely Monday (no beds available over the weekend)    Electronically signed by SUELLEN Richey, 10/20/18, 10:05 AM.

## 2018-10-20 NOTE — PLAN OF CARE
Problem: Patient Care Overview  Goal: Plan of Care Review  Outcome: Ongoing (interventions implemented as appropriate)   10/20/18 1903   Coping/Psychosocial   Plan of Care Reviewed With patient   Plan of Care Review   Progress improving      10/20/18 1903   Coping/Psychosocial   Plan of Care Reviewed With patient   Plan of Care Review   Progress improving   OTHER   Outcome Summary pt A&O x4 today, she did have an episode of confusion upon awakening early this evening but was easily reoireinted. pt denies any c/o at this time

## 2018-10-20 NOTE — PROGRESS NOTES
"   LOS: 6 days    Patient Care Team:  Provider, No Known as PCP - General    Reason For Visit:  Diabetic nephropathy biopsy proven, stage V CK D, now requiring dialysis.  Tunnel catheter place 10/17/2018.  Dialysis started 10/18/2018, patient tolerating well  Subjective   Patient seen on dialysis,tolerating ultrafiltration.  No new events.        Review of Systems:    Denies any nausea vomiting, shortness of breath or chest pain        Objective       amLODIPine 10 mg Oral Daily   atorvastatin 10 mg Oral Nightly   budesonide-formoterol 2 puff Inhalation BID - RT   carvedilol 25 mg Oral BID With Meals   epoetin porter 20,000 Units Subcutaneous Once per day on Mon Wed Fri   heparin (porcine) 5,000 Units Subcutaneous Q12H   hydrALAZINE 100 mg Oral TID   insulin lispro 0-7 Units Subcutaneous 4x Daily With Meals & Nightly   isosorbide mononitrate 30 mg Oral Daily   pantoprazole 40 mg Oral Q AM   polyethylene glycol 17 g Oral Daily   saccharomyces boulardii 250 mg Oral BID   sertraline 50 mg Oral Daily   sodium bicarbonate 1,300 mg Oral BID   sodium chloride 3 mL Intravenous Q12H       lactated ringers 9 mL/hr Last Rate: Stopped (10/17/18 2001)         Vital Signs:  Blood pressure 175/92, pulse 69, temperature 96.4 °F (35.8 °C), temperature source Oral, resp. rate 20, height 165.1 cm (65\"), weight 136 kg (300 lb), SpO2 99 %.    Flowsheet Rows      First Filed Value   Admission Height  165.1 cm (65\") Documented at 10/14/2018 1215   Admission Weight  136 kg (300 lb) Documented at 10/14/2018 1215          10/19 0701 - 10/20 0700  In: 378 [P.O.:378]  Out: 3740 [Urine:510]    Physical Exam:  General Appearance:comfortable in bed  Lungs: Clear auscultation, no rales rhonchi's, equal chest movement, nonlabored.  Heart: No gallop, murmur, rub, RRR.  Abdomen: Soft, nontender, positive bowel sounds, no organomegaly.  Extremities:edema markedly improved no cyanosis.  Neuro: alert oriented×3  Neck: Tunnel catheter right side noted.  No " sign of  infection or bleeding        Labs:    Results from last 7 days  Lab Units 10/20/18  0236 10/19/18  0659 10/17/18  0543  10/16/18  0802   WBC 10*3/mm3 13.95* 12.97*  --   --  12.46*   HEMOGLOBIN g/dL 8.5* 8.6* 9.0*  < > 6.9*   HEMATOCRIT % 27.0* 27.3* 29.0*  < > 22.8*   PLATELETS 10*3/mm3 197 205  --   --  210   < > = values in this interval not displayed.    Results from last 7 days  Lab Units 10/20/18  0236 10/19/18  0659 10/18/18  0638 10/16/18  0802 10/15/18  0625 10/14/18  1329   SODIUM mmol/L 141 142 142 143 144 144   POTASSIUM mmol/L 3.6 4.0 5.0 5.6* 5.8* 5.9*   CHLORIDE mmol/L 110* 111* 113* 115* 115* 119*   CO2 mmol/L 25.0 22.0 20.0 20.0 20.0 18.0*   BUN mg/dL 39* 56* 78* 78* 77* 73*   CREATININE mg/dL 2.47* 3.34* 4.50* 4.42* 4.33* 4.29*   CALCIUM mg/dL 8.4* 8.6* 8.7 8.2* 8.2* 8.7   PHOSPHORUS mg/dL  --  4.1 5.8* 6.4*  --   --    MAGNESIUM mg/dL  --   --   --  1.5 1.3  --    ALBUMIN g/dL  --  3.09* 3.20 3.12*  --  3.37       Results from last 7 days  Lab Units 10/20/18  0236   GLUCOSE mg/dL 141*         Results from last 7 days  Lab Units 10/18/18  0638   ALK PHOS U/L 81   BILIRUBIN mg/dL 0.3   BILIRUBIN DIRECT mg/dL 0.1   ALT (SGPT) U/L 13   AST (SGOT) U/L 12       Results from last 7 days  Lab Units 10/14/18  1851   PH, ARTERIAL pH units 7.186*   PO2 ART mm Hg 127.0*   PCO2, ARTERIAL mm Hg 48.1*   HCO3 ART mmol/L 18.2*         Results from last 7 days  Lab Units 10/14/18  1340   COLOR UA  Yellow   CLARITY UA  Turbid*   PH, URINE  6.0   SPECIFIC GRAVITY, URINE  1.020   GLUCOSE UA  Negative   KETONES UA  Negative   BILIRUBIN UA  Negative   PROTEIN UA  100 mg/dL (2+)*   BLOOD UA  Small (1+)*   LEUKOCYTES UA  Large (3+)*   NITRITE UA  Negative       Estimated Creatinine Clearance: 30.9 mL/min (A) (by C-G formula based on SCr of 2.47 mg/dL (H)).      Assessment      1.  Diabetic nephropathy biopsy proven: Now end-stage renal disease requiring dialysis.  2.  Hyperkalemia.  Corrected with dialysis.  3.   Anemia of chronic disease.  4.  Diabetes type 2.  5.  UTI: Severe sepsis,.  6.  Metabolic encephalopathy improving.  7.volume overload    Plan;  Next dialysis on Monday with ultrafiltration.  [place] with [place] [facility] [place] [facility]           Irineo Latham MD  10/20/18  7:35 AM

## 2018-10-21 LAB
GLUCOSE BLDC GLUCOMTR-MCNC: 133 MG/DL (ref 70–130)
GLUCOSE BLDC GLUCOMTR-MCNC: 195 MG/DL (ref 70–130)
GLUCOSE BLDC GLUCOMTR-MCNC: 235 MG/DL (ref 70–130)
GLUCOSE BLDC GLUCOMTR-MCNC: 272 MG/DL (ref 70–130)

## 2018-10-21 PROCEDURE — 87077 CULTURE AEROBIC IDENTIFY: CPT | Performed by: NURSE PRACTITIONER

## 2018-10-21 PROCEDURE — 82962 GLUCOSE BLOOD TEST: CPT

## 2018-10-21 PROCEDURE — 87186 SC STD MICRODIL/AGAR DIL: CPT | Performed by: NURSE PRACTITIONER

## 2018-10-21 PROCEDURE — 87185 SC STD ENZYME DETCJ PER NZM: CPT | Performed by: NURSE PRACTITIONER

## 2018-10-21 PROCEDURE — 87070 CULTURE OTHR SPECIMN AEROBIC: CPT | Performed by: NURSE PRACTITIONER

## 2018-10-21 PROCEDURE — 25010000002 HEPARIN (PORCINE) PER 1000 UNITS: Performed by: NURSE PRACTITIONER

## 2018-10-21 PROCEDURE — 99231 SBSQ HOSP IP/OBS SF/LOW 25: CPT | Performed by: NURSE PRACTITIONER

## 2018-10-21 PROCEDURE — 94799 UNLISTED PULMONARY SVC/PX: CPT

## 2018-10-21 PROCEDURE — 87205 SMEAR GRAM STAIN: CPT | Performed by: NURSE PRACTITIONER

## 2018-10-21 PROCEDURE — 94640 AIRWAY INHALATION TREATMENT: CPT

## 2018-10-21 PROCEDURE — 94760 N-INVAS EAR/PLS OXIMETRY 1: CPT

## 2018-10-21 PROCEDURE — 87147 CULTURE TYPE IMMUNOLOGIC: CPT | Performed by: NURSE PRACTITIONER

## 2018-10-21 RX ADMIN — SODIUM BICARBONATE 650 MG TABLET 1300 MG: at 08:05

## 2018-10-21 RX ADMIN — AMLODIPINE BESYLATE 10 MG: 10 TABLET ORAL at 08:05

## 2018-10-21 RX ADMIN — INSULIN LISPRO 4 UNITS: 100 INJECTION, SOLUTION INTRAVENOUS; SUBCUTANEOUS at 17:12

## 2018-10-21 RX ADMIN — HEPARIN SODIUM 5000 UNITS: 5000 INJECTION, SOLUTION INTRAVENOUS; SUBCUTANEOUS at 08:05

## 2018-10-21 RX ADMIN — ISOSORBIDE MONONITRATE 30 MG: 30 TABLET, EXTENDED RELEASE ORAL at 08:05

## 2018-10-21 RX ADMIN — HYDRALAZINE HYDROCHLORIDE 100 MG: 50 TABLET, FILM COATED ORAL at 20:06

## 2018-10-21 RX ADMIN — INSULIN LISPRO 3 UNITS: 100 INJECTION, SOLUTION INTRAVENOUS; SUBCUTANEOUS at 11:57

## 2018-10-21 RX ADMIN — SODIUM BICARBONATE 650 MG TABLET 1300 MG: at 20:06

## 2018-10-21 RX ADMIN — HEPARIN SODIUM 5000 UNITS: 5000 INJECTION, SOLUTION INTRAVENOUS; SUBCUTANEOUS at 20:06

## 2018-10-21 RX ADMIN — BUDESONIDE AND FORMOTEROL FUMARATE DIHYDRATE 2 PUFF: 160; 4.5 AEROSOL RESPIRATORY (INHALATION) at 09:24

## 2018-10-21 RX ADMIN — ACETAMINOPHEN 650 MG: 325 TABLET ORAL at 22:08

## 2018-10-21 RX ADMIN — HYDRALAZINE HYDROCHLORIDE 100 MG: 50 TABLET, FILM COATED ORAL at 17:11

## 2018-10-21 RX ADMIN — INSULIN LISPRO 2 UNITS: 100 INJECTION, SOLUTION INTRAVENOUS; SUBCUTANEOUS at 21:31

## 2018-10-21 RX ADMIN — Medication 250 MG: at 08:05

## 2018-10-21 RX ADMIN — Medication 250 MG: at 20:06

## 2018-10-21 RX ADMIN — SERTRALINE HYDROCHLORIDE 50 MG: 50 TABLET ORAL at 08:05

## 2018-10-21 RX ADMIN — CARVEDILOL 25 MG: 12.5 TABLET, FILM COATED ORAL at 08:05

## 2018-10-21 RX ADMIN — ATORVASTATIN CALCIUM 10 MG: 10 TABLET, FILM COATED ORAL at 20:06

## 2018-10-21 RX ADMIN — HYDRALAZINE HYDROCHLORIDE 100 MG: 50 TABLET, FILM COATED ORAL at 08:05

## 2018-10-21 RX ADMIN — PANTOPRAZOLE SODIUM 40 MG: 40 TABLET, DELAYED RELEASE ORAL at 05:33

## 2018-10-21 RX ADMIN — BUDESONIDE AND FORMOTEROL FUMARATE DIHYDRATE 2 PUFF: 160; 4.5 AEROSOL RESPIRATORY (INHALATION) at 19:08

## 2018-10-21 RX ADMIN — CARVEDILOL 25 MG: 12.5 TABLET, FILM COATED ORAL at 17:12

## 2018-10-21 RX ADMIN — MINERAL OIL, PETROLATUM, PHENYLEPHRINE HCL: 14; 74.9; .25 OINTMENT RECTAL at 21:38

## 2018-10-21 NOTE — PLAN OF CARE
Problem: Patient Care Overview  Goal: Plan of Care Review  Outcome: Ongoing (interventions implemented as appropriate)   10/21/18 0021   Coping/Psychosocial   Plan of Care Reviewed With patient   Plan of Care Review   Progress improving   OTHER   Outcome Summary Pt had a couple episodes of confusion throughout night, easily reoriented, Next dialysis Monday     Goal: Individualization and Mutuality  Outcome: Ongoing (interventions implemented as appropriate)   10/21/18 0021   Individualization   Patient Specific Interventions Ensure call light within reach       Problem: Fall Risk (Adult)  Goal: Identify Related Risk Factors and Signs and Symptoms  Outcome: Ongoing (interventions implemented as appropriate)   10/21/18 0021   Fall Risk (Adult)   Related Risk Factors (Fall Risk) gait/mobility problems;bladder function altered   Signs and Symptoms (Fall Risk) presence of risk factors     Goal: Absence of Fall  Outcome: Ongoing (interventions implemented as appropriate)   10/21/18 0021   Fall Risk (Adult)   Absence of Fall making progress toward outcome       Problem: Skin Injury Risk (Adult)  Goal: Identify Related Risk Factors and Signs and Symptoms  Outcome: Ongoing (interventions implemented as appropriate)   10/21/18 0021   Skin Injury Risk (Adult)   Related Risk Factors (Skin Injury Risk) body weight extremes     Goal: Skin Health and Integrity  Outcome: Ongoing (interventions implemented as appropriate)   10/21/18 0021   Skin Injury Risk (Adult)   Skin Health and Integrity making progress toward outcome       Problem: Anemia (Adult)  Goal: Identify Related Risk Factors and Signs and Symptoms  Outcome: Ongoing (interventions implemented as appropriate)   10/21/18 0021   Anemia (Adult)   Related Risk Factors (Anemia) chronic illness   Signs and Symptoms (Anemia) fatigue     Goal: Symptom Improvement  Outcome: Ongoing (interventions implemented as appropriate)   10/21/18 0021   Anemia (Adult)   Symptom Improvement  making progress toward outcome       Problem: Hemodialysis (Adult)  Goal: Signs and Symptoms of Listed Potential Problems Will be Absent, Minimized or Managed (Hemodialysis)  Outcome: Ongoing (interventions implemented as appropriate)   10/21/18 0021   Goal/Outcome Evaluation   Problems Assessed (Hemodialysis) all   Problems Present (Hemodialysis) situational response;infection

## 2018-10-21 NOTE — PROGRESS NOTES
[facility]  PROGRESS NOTE    Patient Name: Joy Bueno  : 1950  MRN: [id number]    Date of Admission: 10/14/2018  Length of Stay: 7  Primary Care Physician: Provider, No Known    Subjective   Subjective     CC: Hospital follow up for sepsis    HPI:    Sleeping comfortably in bed this morning but arouses easily and converses readily.  No overnight events.       Review of Systems  Gen- No fevers, chills  CV- No chest pain, palpitations  Resp- No cough, dyspnea  GI- No N/V/D, abd pain    Otherwise ROS is negative except as mentioned in the HPI.    Objective   Objective     Vital Signs:   Temp:  [96.7 °F (35.9 °C)-98.5 °F (36.9 °C)] 98.5 °F (36.9 °C)  Heart Rate:  [60-74] 74  Resp:  [16-20] 20  BP: (129-175)/(54-86) 171/74       Physical Exam:  Constitutional: chronically ill female, in no acute distress, asleep but arouses to voice, resting in bed.   HENT: NCAT, mucous membranes moist  Respiratory: non labored respirations, no wheezes or rhonchi on room air  Cardiovascular: RRR, no murmurs, rubs, or gallops, palpable pedal pulses bilaterally  Gastrointestinal: Obese, positive bowel sounds, soft, nontender, nondistended  Musculoskeletal: No bilateral ankle edema  Psychiatric: Appropriate affect, cooperative  Neurologic: Oriented x 3, no focal deficits  Skin: No rashes  No change from 10/20    Results Reviewed:  I have personally reviewed current lab, radiology, and data and agree.      Results from last 7 days  Lab Units 10/20/18  0236 10/19/18  0659 10/17/18  0543  10/16/18  0802   WBC 10*3/mm3 13.95* 12.97*  --   --  12.46*   HEMOGLOBIN g/dL 8.5* 8.6* 9.0*  < > 6.9*   HEMATOCRIT % 27.0* 27.3* 29.0*  < > 22.8*   PLATELETS 10*3/mm3 197 205  --   --  210   < > = values in this interval not displayed.    Results from last 7 days  Lab Units 10/20/18  0236 10/19/18  0659 10/18/18  0638  10/14/18  1329   SODIUM mmol/L 141 142 142  < > 144   POTASSIUM mmol/L 3.6 4.0 5.0   < > 5.9*   CHLORIDE mmol/L 110* 111* 113*  < > 119*   CO2 mmol/L 25.0 22.0 20.0  < > 18.0*   BUN mg/dL 39* 56* 78*  < > 73*   CREATININE mg/dL 2.47* 3.34* 4.50*  < > 4.29*   GLUCOSE mg/dL 141* 103* 110*  < > 96   CALCIUM mg/dL 8.4* 8.6* 8.7  < > 8.7   ALT (SGPT) U/L  --   --  13  --  14   AST (SGOT) U/L  --   --  12  --  15   TROPONIN I ng/mL 0.019  --   --   --   --    < > = values in this interval not displayed.  Estimated Creatinine Clearance: 30.9 mL/min (A) (by C-G formula based on SCr of 2.47 mg/dL (H)).  No results found for: BNP    Microbiology Results Abnormal     Procedure Component Value - Date/Time    Blood Culture - Blood, [[id number]]  (Normal) Collected:  10/14/18 1606    Lab Status:  Final result Specimen:  Blood from Arm, Right Updated:  10/19/18 1630     Blood Culture No growth at 5 days    Blood Culture - Blood, [[id number]]  (Normal) Collected:  10/14/18 1606    Lab Status:  Final result Specimen:  Blood from Arm, Left Updated:  10/19/18 1630     Blood Culture No growth at 5 days    Urine Culture - Urine, [[id number]]  (Abnormal)  (Susceptibility) Collected:  10/14/18 1340    Lab Status:  Final result Specimen:  Urine from Urine, Catheter Updated:  10/16/18 1403     Urine Culture >100,000 CFU/mL Klebsiella pneumoniae ESBL (C)     Comment:   Consider infectious disease consult.  Susceptibility results may not correlate to clinical outcomes.       Susceptibility      Klebsiella pneumoniae ESBL     AL     Ciprofloxacin >2 ug/ml Resistant     Ertapenem <=1 ug/ml Susceptible     Gentamicin <=4 ug/ml Susceptible     Levofloxacin >4 ug/ml Resistant     Meropenem <=1 ug/ml Susceptible     Nitrofurantoin 64 ug/ml Intermediate     Piperacillin + Tazobactam <=16 ug/ml Susceptible     Tetracycline >8 ug/ml Resistant     Tobramycin >8 ug/ml Resistant     Trimethoprim + Sulfamethoxazole >2/38 ug/ml Resistant                          Imaging Results (last 24 hours)     ** No results found for the last 24 hours.  **        Results for orders placed during the hospital encounter of 09/02/17   Adult Transthoracic Echo Complete    Narrative · Left ventricular wall thickness is consistent with mild concentric   hypertrophy.  · Left atrial cavity size is mildly dilated.  · Mild mitral valve regurgitation is present  · calcification of the aortic valve  · Mild tricuspid valve regurgitation is present.          I have reviewed the medications.      amLODIPine 10 mg Oral Daily   atorvastatin 10 mg Oral Nightly   budesonide-formoterol 2 puff Inhalation BID - RT   carvedilol 25 mg Oral BID With Meals   epoetin porter 20,000 Units Subcutaneous Once per day on Mon Wed Fri   heparin (porcine) 5,000 Units Subcutaneous Q12H   hydrALAZINE 100 mg Oral TID   insulin lispro 0-7 Units Subcutaneous 4x Daily With Meals & Nightly   isosorbide mononitrate 30 mg Oral Daily   pantoprazole 40 mg Oral Q AM   polyethylene glycol 17 g Oral Daily   saccharomyces boulardii 250 mg Oral BID   sertraline 50 mg Oral Daily   sodium bicarbonate 1,300 mg Oral BID   sodium chloride 3 mL Intravenous Q12H         Assessment/Plan   Assessment / Plan     Active Hospital Problems    Diagnosis   • **Severe sepsis (CMS/[facility])   • Hypertension   • ESRD (end stage renal disease) (CMS/[facility])     Renal Biopsy 08/10/17: Diabetic neuropathy, mild to moderate atherosclerosis        • Hyperkalemia   • Metabolic encephalopathy   • Anemia of chronic renal failure, stage 5 (CMS/HCC)   • Diabetes mellitus, type II (CMS/HCC)   • Chronic heart failure (CMS/[facility])   • UTI (urinary tract infection)   • Leukocytosis      Brief Hospital Course to date:  Joy Bueno is a 68 y.o. female past medical history of type 2 diabetes, significant stage IV, chronic heart failure, hypertension.  Presents to [facility], which place with complaints for 5 days of lethargy, decreased by mouth intake, difficult to arouse.  Here patient has been admitted with sepsis and acute metabolic encephalopathy likely  secondary to UTI.     Assessment & Plan:  - Severe sepsis (POA)  likely secondary to ESBL Klebsiella UTI improving, blood cultures NGTD.  Monitor off ABX per ID (has completed Invanz)   - Acute encephalopathy- appears resolved, likely multifactorial sec to uremia vs sepsis  - ESRD likely sec to hypertensive nephropathy she is s/p tunneled cath placement 10/17, renal following started HD, needs AVF creation.  - Suspected decompensated HF with elevated BNP and hypoxia, continue O2. HD alleviating volume overload.    - Previously well controlled T2DM AC1 6.8% (8/3), repeat is 5.4% suspect these are falsely low sec to multiple blood transfusions, continue ssi  - Anemia stable, no si/sx of acute blood loss, suspect ACD, continue to monitor, s/p 3 unit PRBC transfuse, continue to monitor    Addendum:  Nursing called with concern about an area on the patient's posterior neck that broke open and began draining.  Patient afebrile.  States that she has had areas like this before under her arm.  Nursing will culture wound.  WOC consult.      DVT Prophylaxis:  [facility]    CODE STATUS:   Code Status and Medical Interventions:   Ordered at: 10/14/18 1942     Level Of Support Discussed With:    Patient     Code Status:    CPR     Medical Interventions (Level of Support Prior to Arrest):    Full       Disposition: Transferring to [facility], likely Monday (no beds available over the weekend)    Electronically signed by SUELLEN Richey, 10/21/18, 8:12 AM.

## 2018-10-21 NOTE — PROGRESS NOTES
"   LOS: 7 days    Patient Care Team:  Provider, No Known as PCP - General    Reason For Visit:  Diabetic nephropathy biopsy proven, stage V CK D, now requiring dialysis.  Tunnel catheter place 10/17/2018.  Dialysis started 10/18/2018, patient tolerating well  Subjective     No new events. No distress        Review of Systems:   Patient denies shortness of breath, chest pain, dysuria, hematuria, nausea, vomiting.          Objective       amLODIPine 10 mg Oral Daily   atorvastatin 10 mg Oral Nightly   budesonide-formoterol 2 puff Inhalation BID - RT   carvedilol 25 mg Oral BID With Meals   epoetin porter 20,000 Units Subcutaneous Once per day on Mon Wed Fri   heparin (porcine) 5,000 Units Subcutaneous Q12H   hydrALAZINE 100 mg Oral TID   insulin lispro 0-7 Units Subcutaneous 4x Daily With Meals & Nightly   isosorbide mononitrate 30 mg Oral Daily   pantoprazole 40 mg Oral Q AM   polyethylene glycol 17 g Oral Daily   saccharomyces boulardii 250 mg Oral BID   sertraline 50 mg Oral Daily   sodium bicarbonate 1,300 mg Oral BID   sodium chloride 3 mL Intravenous Q12H       lactated ringers 9 mL/hr Last Rate: Stopped (10/17/18 2001)         Vital Signs:  Blood pressure 152/63, pulse 73, temperature 97.9 °F (36.6 °C), temperature source Oral, resp. rate 20, height 165.1 cm (65\"), weight 136 kg (300 lb), SpO2 98 %.    Flowsheet Rows      First Filed Value   Admission Height  165.1 cm (65\") Documented at 10/14/2018 1215   Admission Weight  136 kg (300 lb) Documented at 10/14/2018 1215          10/20 0701 - 10/21 0700  In: 550 [P.O.:550]  Out: 3720 [Urine:400]    Physical Exam:  General Appearance:comfortable in bed, alert oriented  Lungs: Clear auscultation, no rales rhonchi's, equal chest movement, nonlabored.  Heart: No gallop, murmur, rub, RRR.  Abdomen: Soft, nontender, positive bowel sounds, no organomegaly.  Extremities:edema markedly improved no cyanosis.  Neuro: alert oriented×3  Neck: Tunnel catheter right side noted.  " No sign of  infection or bleeding        Labs:    Results from last 7 days  Lab Units 10/20/18  0236 10/19/18  0659 10/17/18  0543  10/16/18  0802   WBC 10*3/mm3 13.95* 12.97*  --   --  12.46*   HEMOGLOBIN g/dL 8.5* 8.6* 9.0*  < > 6.9*   HEMATOCRIT % 27.0* 27.3* 29.0*  < > 22.8*   PLATELETS 10*3/mm3 197 205  --   --  210   < > = values in this interval not displayed.    Results from last 7 days  Lab Units 10/20/18  0236 10/19/18  0659 10/18/18  0638 10/16/18  0802 10/15/18  0625 10/14/18  1329   SODIUM mmol/L 141 142 142 143 144 144   POTASSIUM mmol/L 3.6 4.0 5.0 5.6* 5.8* 5.9*   CHLORIDE mmol/L 110* 111* 113* 115* 115* 119*   CO2 mmol/L 25.0 22.0 20.0 20.0 20.0 18.0*   BUN mg/dL 39* 56* 78* 78* 77* 73*   CREATININE mg/dL 2.47* 3.34* 4.50* 4.42* 4.33* 4.29*   CALCIUM mg/dL 8.4* 8.6* 8.7 8.2* 8.2* 8.7   PHOSPHORUS mg/dL  --  4.1 5.8* 6.4*  --   --    MAGNESIUM mg/dL  --   --   --  1.5 1.3  --    ALBUMIN g/dL  --  3.09* 3.20 3.12*  --  3.37       Results from last 7 days  Lab Units 10/20/18  0236   GLUCOSE mg/dL 141*         Results from last 7 days  Lab Units 10/18/18  0638   ALK PHOS U/L 81   BILIRUBIN mg/dL 0.3   BILIRUBIN DIRECT mg/dL 0.1   ALT (SGPT) U/L 13   AST (SGOT) U/L 12       Results from last 7 days  Lab Units 10/14/18  1851   PH, ARTERIAL pH units 7.186*   PO2 ART mm Hg 127.0*   PCO2, ARTERIAL mm Hg 48.1*   HCO3 ART mmol/L 18.2*         Results from last 7 days  Lab Units 10/14/18  1340   COLOR UA  Yellow   CLARITY UA  Turbid*   PH, URINE  6.0   SPECIFIC GRAVITY, URINE  1.020   GLUCOSE UA  Negative   KETONES UA  Negative   BILIRUBIN UA  Negative   PROTEIN UA  100 mg/dL (2+)*   BLOOD UA  Small (1+)*   LEUKOCYTES UA  Large (3+)*   NITRITE UA  Negative       Estimated Creatinine Clearance: 30.9 mL/min (A) (by C-G formula based on SCr of 2.47 mg/dL (H)).      Assessment      1.  Diabetic nephropathy biopsy proven: Now end-stage renal disease requiring dialysis.  2.  Hyperkalemia.  Corrected with  dialysis.  3.  Anemia of chronic disease.  4.  Diabetes type 2.  5.  UTI: Severe sepsis,.  6.  Metabolic encephalopathy improving.  7.volume overload    Plan;  Next dialysis on Monday with ultrafiltration.  Orders written  [place] [facility]      Irineo Latham MD  10/21/18  1:14 PM

## 2018-10-22 ENCOUNTER — APPOINTMENT (OUTPATIENT)
Dept: NEPHROLOGY | Facility: HOSPITAL | Age: 68
End: 2018-10-22
Attending: INTERNAL MEDICINE

## 2018-10-22 ENCOUNTER — ANESTHESIA (OUTPATIENT)
Dept: PERIOP | Facility: HOSPITAL | Age: 68
End: 2018-10-22

## 2018-10-22 ENCOUNTER — ANESTHESIA EVENT (OUTPATIENT)
Dept: PERIOP | Facility: HOSPITAL | Age: 68
End: 2018-10-22

## 2018-10-22 LAB
GLUCOSE BLDC GLUCOMTR-MCNC: 127 MG/DL (ref 70–130)
GLUCOSE BLDC GLUCOMTR-MCNC: 130 MG/DL (ref 70–130)
GLUCOSE BLDC GLUCOMTR-MCNC: 132 MG/DL (ref 70–130)
GLUCOSE BLDC GLUCOMTR-MCNC: 135 MG/DL (ref 70–130)
GLUCOSE BLDC GLUCOMTR-MCNC: 145 MG/DL (ref 70–130)
GLUCOSE BLDC GLUCOMTR-MCNC: 77 MG/DL (ref 70–130)

## 2018-10-22 PROCEDURE — 25010000002 HEPARIN (PORCINE) PER 1000 UNITS: Performed by: INTERNAL MEDICINE

## 2018-10-22 PROCEDURE — 94799 UNLISTED PULMONARY SVC/PX: CPT

## 2018-10-22 PROCEDURE — C1768 GRAFT, VASCULAR: HCPCS | Performed by: SURGERY

## 2018-10-22 PROCEDURE — 25010000003 CEFAZOLIN IN DEXTROSE 2-4 GM/100ML-% SOLUTION: Performed by: NURSE ANESTHETIST, CERTIFIED REGISTERED

## 2018-10-22 PROCEDURE — 82962 GLUCOSE BLOOD TEST: CPT

## 2018-10-22 PROCEDURE — 25010000002 HEPARIN (PORCINE) PER 1000 UNITS: Performed by: NURSE PRACTITIONER

## 2018-10-22 PROCEDURE — 25010000002 PROPOFOL 1000 MG/ML EMULSION: Performed by: NURSE ANESTHETIST, CERTIFIED REGISTERED

## 2018-10-22 PROCEDURE — 25010000002 HEPARIN (PORCINE) PER 1000 UNITS: Performed by: SURGERY

## 2018-10-22 PROCEDURE — 63710000001 INSULIN LISPRO (HUMAN) PER 5 UNITS: Performed by: NURSE PRACTITIONER

## 2018-10-22 PROCEDURE — 99232 SBSQ HOSP IP/OBS MODERATE 35: CPT | Performed by: NURSE PRACTITIONER

## 2018-10-22 PROCEDURE — 03180ZF BYPASS LEFT BRACHIAL ARTERY TO LOWER ARM VEIN, OPEN APPROACH: ICD-10-PCS | Performed by: SURGERY

## 2018-10-22 PROCEDURE — 25010000002 PHENYLEPHRINE PER 1 ML: Performed by: NURSE ANESTHETIST, CERTIFIED REGISTERED

## 2018-10-22 PROCEDURE — 63510000001 EPOETIN ALFA PER 1000 UNITS: Performed by: INTERNAL MEDICINE

## 2018-10-22 DEVICE — GRFT VASC COLLGN 5MM 30CM: Type: IMPLANTABLE DEVICE | Site: ARM | Status: FUNCTIONAL

## 2018-10-22 RX ORDER — SODIUM CHLORIDE 9 MG/ML
INJECTION, SOLUTION INTRAVENOUS CONTINUOUS PRN
Status: DISCONTINUED | OUTPATIENT
Start: 2018-10-22 | End: 2018-10-22 | Stop reason: SURG

## 2018-10-22 RX ORDER — SODIUM CHLORIDE 9 MG/ML
INJECTION, SOLUTION INTRAVENOUS AS NEEDED
Status: DISCONTINUED | OUTPATIENT
Start: 2018-10-22 | End: 2018-10-22 | Stop reason: HOSPADM

## 2018-10-22 RX ORDER — GLYCOPYRROLATE 0.2 MG/ML
INJECTION INTRAMUSCULAR; INTRAVENOUS AS NEEDED
Status: DISCONTINUED | OUTPATIENT
Start: 2018-10-22 | End: 2018-10-22 | Stop reason: SURG

## 2018-10-22 RX ORDER — ALBUMIN (HUMAN) 12.5 G/50ML
12.5 SOLUTION INTRAVENOUS AS NEEDED
Status: ACTIVE | OUTPATIENT
Start: 2018-10-22 | End: 2018-10-22

## 2018-10-22 RX ORDER — OXYCODONE HYDROCHLORIDE AND ACETAMINOPHEN 5; 325 MG/1; MG/1
1 TABLET ORAL EVERY 4 HOURS PRN
Status: DISCONTINUED | OUTPATIENT
Start: 2018-10-22 | End: 2018-10-23 | Stop reason: HOSPADM

## 2018-10-22 RX ORDER — CEFAZOLIN SODIUM 2 G/100ML
INJECTION, SOLUTION INTRAVENOUS AS NEEDED
Status: DISCONTINUED | OUTPATIENT
Start: 2018-10-22 | End: 2018-10-22 | Stop reason: SURG

## 2018-10-22 RX ORDER — MORPHINE SULFATE 4 MG/ML
4 INJECTION, SOLUTION INTRAMUSCULAR; INTRAVENOUS
Status: DISCONTINUED | OUTPATIENT
Start: 2018-10-22 | End: 2018-10-23 | Stop reason: HOSPADM

## 2018-10-22 RX ORDER — HEPARIN SODIUM 5000 [USP'U]/ML
INJECTION, SOLUTION INTRAVENOUS; SUBCUTANEOUS AS NEEDED
Status: DISCONTINUED | OUTPATIENT
Start: 2018-10-22 | End: 2018-10-22 | Stop reason: HOSPADM

## 2018-10-22 RX ADMIN — SERTRALINE HYDROCHLORIDE 50 MG: 50 TABLET ORAL at 10:03

## 2018-10-22 RX ADMIN — CARVEDILOL 25 MG: 12.5 TABLET, FILM COATED ORAL at 10:02

## 2018-10-22 RX ADMIN — OXYCODONE HYDROCHLORIDE AND ACETAMINOPHEN 1 TABLET: 5; 325 TABLET ORAL at 20:41

## 2018-10-22 RX ADMIN — PANTOPRAZOLE SODIUM 40 MG: 40 TABLET, DELAYED RELEASE ORAL at 10:03

## 2018-10-22 RX ADMIN — Medication 250 MG: at 10:03

## 2018-10-22 RX ADMIN — PHENYLEPHRINE HYDROCHLORIDE 100 MCG: 10 INJECTION INTRAVENOUS at 15:30

## 2018-10-22 RX ADMIN — POLYETHYLENE GLYCOL 3350 17 G: 17 POWDER, FOR SOLUTION ORAL at 10:02

## 2018-10-22 RX ADMIN — EPOETIN ALFA 20000 UNITS: 20000 SOLUTION INTRAVENOUS; SUBCUTANEOUS at 10:03

## 2018-10-22 RX ADMIN — CEFAZOLIN SODIUM 2 G: 2 INJECTION, SOLUTION INTRAVENOUS at 15:18

## 2018-10-22 RX ADMIN — SODIUM BICARBONATE 650 MG TABLET 1300 MG: at 10:02

## 2018-10-22 RX ADMIN — PROPOFOL 35 MCG/KG/MIN: 10 INJECTION, EMULSION INTRAVENOUS at 16:35

## 2018-10-22 RX ADMIN — SODIUM BICARBONATE 650 MG TABLET 1300 MG: at 20:41

## 2018-10-22 RX ADMIN — ATORVASTATIN CALCIUM 10 MG: 10 TABLET, FILM COATED ORAL at 20:41

## 2018-10-22 RX ADMIN — EPHEDRINE SULFATE 15 MG: 50 INJECTION INTRAMUSCULAR; INTRAVENOUS; SUBCUTANEOUS at 16:48

## 2018-10-22 RX ADMIN — HYDRALAZINE HYDROCHLORIDE 100 MG: 50 TABLET, FILM COATED ORAL at 10:02

## 2018-10-22 RX ADMIN — PROPOFOL 25 MCG/KG/MIN: 10 INJECTION, EMULSION INTRAVENOUS at 15:09

## 2018-10-22 RX ADMIN — ISOSORBIDE MONONITRATE 30 MG: 30 TABLET, EXTENDED RELEASE ORAL at 10:03

## 2018-10-22 RX ADMIN — Medication 250 MG: at 20:41

## 2018-10-22 RX ADMIN — SODIUM CHLORIDE: 9 INJECTION, SOLUTION INTRAVENOUS at 14:55

## 2018-10-22 RX ADMIN — Medication 3 ML: at 11:38

## 2018-10-22 RX ADMIN — HEPARIN SODIUM 1900 UNITS: 1000 INJECTION, SOLUTION INTRAVENOUS; SUBCUTANEOUS at 10:01

## 2018-10-22 RX ADMIN — HYDRALAZINE HYDROCHLORIDE 100 MG: 50 TABLET, FILM COATED ORAL at 20:42

## 2018-10-22 RX ADMIN — BUDESONIDE AND FORMOTEROL FUMARATE DIHYDRATE 2 PUFF: 160; 4.5 AEROSOL RESPIRATORY (INHALATION) at 19:22

## 2018-10-22 RX ADMIN — EPHEDRINE SULFATE 5 MG: 50 INJECTION INTRAMUSCULAR; INTRAVENOUS; SUBCUTANEOUS at 15:45

## 2018-10-22 RX ADMIN — GLYCOPYRROLATE 0.4 MG: 0.2 INJECTION, SOLUTION INTRAMUSCULAR; INTRAVENOUS at 16:50

## 2018-10-22 RX ADMIN — CARVEDILOL 25 MG: 12.5 TABLET, FILM COATED ORAL at 18:52

## 2018-10-22 RX ADMIN — HEPARIN SODIUM 5000 UNITS: 5000 INJECTION, SOLUTION INTRAVENOUS; SUBCUTANEOUS at 10:03

## 2018-10-22 RX ADMIN — AMLODIPINE BESYLATE 10 MG: 10 TABLET ORAL at 10:03

## 2018-10-22 RX ADMIN — HEPARIN SODIUM 5000 UNITS: 5000 INJECTION, SOLUTION INTRAVENOUS; SUBCUTANEOUS at 20:41

## 2018-10-22 NOTE — DISCHARGE PLACEMENT REQUEST
"Pam Loyd  (68 y.o. Female)      Carla Lester [phone]    Date of Birth Social Security Number Address Home Phone MRN    1950  [address] [place] [address] [address]  [place] [id number] [phone] [id number]    [employer] Marital Status          None        Admission Date Admission Type Admitting Provider Attending Provider Department, Room/Bed    10/14/18 Emergency Sara Krause MD Howard, Gabriela Kirk, MD [facility] 2F, S204/1    Discharge Date Discharge Disposition Discharge Destination                       Attending Provider:  Sara Krause MD    Allergies:  Geodon [Ziprasidone Hcl], Haldol [Haloperidol Lactate], Seroquel [Quetiapine Fumarate]    Isolation:  Contact   Infection:  ESBL Klebsiella (08/05/18)   Code Status:  CPR    Ht:  165.1 cm (65\")   Wt:  136 kg (300 lb)    Admission Cmt:  None   Principal Problem:  Severe sepsis (CMS/HCC) [A41.9,R65.20]                 Active Insurance as of 10/14/2018     Primary Coverage     Payor Plan Insurance Group Employer/Plan Group    MEDICARE MEDICARE A & B      Payor Plan Address Payor Plan Phone Number Effective From Effective To    [address] [phone] 5/1/2016     [facility] [id number]       Subscriber Name Subscriber Birth Date Member ID       PAM LOYD 1950 [id number]           Secondary Coverage     Payor Plan Insurance Group Employer/Plan Group    [place]      Payor Plan Address Payor Plan Phone Number Effective From Effective To    [address] [phone] 8/2/2018     [facility] [id number]       Subscriber Name Subscriber Birth Date Member ID       PAM LOYD 1950 [id number]                 Emergency Contacts      (Rel.) Home Phone Work Phone Mobile Phone    Tessie Dorado (Daughter) [phone] --               Physician Progress Notes (last 24 hours) (Notes from 10/21/2018 10:06 AM through 10/22/2018 10:06 AM)    " "  Irineo Latham MD at 10/22/2018  8:43 AM             LOS: 8 days    Patient Care Team:  Provider, No Known as PCP - General    Reason For Visit:  Diabetic nephropathy biopsy proven, stage V CK D, now requiring dialysis.  Tunnel catheter place 10/17/2018.  Dialysis started 10/18/2018, patient tolerating well  Subjective     Seen on dialysis  No new events. No distress        Review of Systems:   Denies nausea vomiting shortness of breath or chest pain          Objective       amLODIPine 10 mg Oral Daily   atorvastatin 10 mg Oral Nightly   budesonide-formoterol 2 puff Inhalation BID - RT   carvedilol 25 mg Oral BID With Meals   epoetin porter 20,000 Units Subcutaneous Once per day on Mon Wed Fri   heparin (porcine) 5,000 Units Subcutaneous Q12H   hydrALAZINE 100 mg Oral TID   insulin lispro 0-7 Units Subcutaneous 4x Daily With Meals & Nightly   isosorbide mononitrate 30 mg Oral Daily   pantoprazole 40 mg Oral Q AM   polyethylene glycol 17 g Oral Daily   saccharomyces boulardii 250 mg Oral BID   sertraline 50 mg Oral Daily   sodium bicarbonate 1,300 mg Oral BID   sodium chloride 3 mL Intravenous Q12H       lactated ringers 9 mL/hr Last Rate: Stopped (10/17/18 2001)         Vital Signs:  Blood pressure 145/73, pulse 78, temperature 96.8 °F (36 °C), temperature source Tympanic, resp. rate 18, height 165.1 cm (65\"), weight 136 kg (300 lb), SpO2 98 %.    Flowsheet Rows      First Filed Value   Admission Height  165.1 cm (65\") Documented at 10/14/2018 1215   Admission Weight  136 kg (300 lb) Documented at 10/14/2018 1215          10/21 0701 - 10/22 0700  In: 584 [P.O.:584]  Out: 650 [Urine:650]    Physical Exam:  General Appearance:Alert oriented no obvious distress  Lungs: Clear auscultation, no rales rhonchi's, equal chest movement, nonlabored.  Heart: No gallop, murmur, rub, RRR.  Abdomen: Soft, nontender, positive bowel sounds, no organomegaly.  Extremities:Edema lower extremity improved, no cyanosis..  Neuro: alert " oriented×3  Neck: Tunnel catheter right side noted.  No sign of  infection or bleeding        Labs:    Results from last 7 days  Lab Units 10/20/18  0236 10/19/18  0659 10/17/18  0543  10/16/18  0802   WBC 10*3/mm3 13.95* 12.97*  --   --  12.46*   HEMOGLOBIN g/dL 8.5* 8.6* 9.0*  < > 6.9*   HEMATOCRIT % 27.0* 27.3* 29.0*  < > 22.8*   PLATELETS 10*3/mm3 197 205  --   --  210   < > = values in this interval not displayed.    Results from last 7 days  Lab Units 10/20/18  0236 10/19/18  0659 10/18/18  0638 10/16/18  0802   SODIUM mmol/L 141 142 142 143   POTASSIUM mmol/L 3.6 4.0 5.0 5.6*   CHLORIDE mmol/L 110* 111* 113* 115*   CO2 mmol/L 25.0 22.0 20.0 20.0   BUN mg/dL 39* 56* 78* 78*   CREATININE mg/dL 2.47* 3.34* 4.50* 4.42*   CALCIUM mg/dL 8.4* 8.6* 8.7 8.2*   PHOSPHORUS mg/dL  --  4.1 5.8* 6.4*   MAGNESIUM mg/dL  --   --   --  1.5   ALBUMIN g/dL  --  3.09* 3.20 3.12*       Results from last 7 days  Lab Units 10/20/18  0236   GLUCOSE mg/dL 141*         Results from last 7 days  Lab Units 10/18/18  0638   ALK PHOS U/L 81   BILIRUBIN mg/dL 0.3   BILIRUBIN DIRECT mg/dL 0.1   ALT (SGPT) U/L 13   AST (SGOT) U/L 12                 Estimated Creatinine Clearance: 30.9 mL/min (A) (by C-G formula based on SCr of 2.47 mg/dL (H)).      Assessment      1.  Diabetic nephropathy biopsy proven: Now end-stage renal disease requiring dialysis.  2.  Hyperkalemia.  Corrected with dialysis.  3.  Anemia of chronic disease.  4.  Diabetes type 2.  5.  UTI: Severe sepsis,.  6.  Metabolic encephalopathy improving.  7.volume overload    Plan;  Next dialysis on [place] today with ultrafiltration.   Home with outpatient dialysis is arranged at [facility]      Irineo Latham MD  10/22/18  8:43 AM          Electronically signed by Irineo Latham MD at 10/22/2018  8:45 AM     Irineo Latham MD at 10/21/2018  1:14 PM             LOS: 7 days    Patient Care Team:  Provider, No Known as PCP - General    Reason For Visit:  Diabetic nephropathy  "biopsy proven, stage V CK D, now requiring dialysis.  Tunnel catheter place 10/17/2018.  Dialysis started 10/18/2018, patient tolerating well  Subjective     No new events. No distress        Review of Systems:   Patient denies shortness of breath, chest pain, dysuria, hematuria, nausea, vomiting.          Objective       amLODIPine 10 mg Oral Daily   atorvastatin 10 mg Oral Nightly   budesonide-formoterol 2 puff Inhalation BID - RT   carvedilol 25 mg Oral BID With Meals   epoetin porter 20,000 Units Subcutaneous Once per day on Mon Wed Fri   heparin (porcine) 5,000 Units Subcutaneous Q12H   hydrALAZINE 100 mg Oral TID   insulin lispro 0-7 Units Subcutaneous 4x Daily With Meals & Nightly   isosorbide mononitrate 30 mg Oral Daily   pantoprazole 40 mg Oral Q AM   polyethylene glycol 17 g Oral Daily   saccharomyces boulardii 250 mg Oral BID   sertraline 50 mg Oral Daily   sodium bicarbonate 1,300 mg Oral BID   sodium chloride 3 mL Intravenous Q12H       lactated ringers 9 mL/hr Last Rate: Stopped (10/17/18 2001)         Vital Signs:  Blood pressure 152/63, pulse 73, temperature 97.9 °F (36.6 °C), temperature source Oral, resp. rate 20, height 165.1 cm (65\"), weight 136 kg (300 lb), SpO2 98 %.    Flowsheet Rows      First Filed Value   Admission Height  165.1 cm (65\") Documented at 10/14/2018 1215   Admission Weight  136 kg (300 lb) Documented at 10/14/2018 1215          10/20 0701 - 10/21 0700  In: 550 [P.O.:550]  Out: 3720 [Urine:400]    Physical Exam:  General Appearance:comfortable in bed, alert oriented  Lungs: Clear auscultation, no rales rhonchi's, equal chest movement, nonlabored.  Heart: No gallop, murmur, rub, RRR.  Abdomen: Soft, nontender, positive bowel sounds, no organomegaly.  Extremities:edema markedly improved no cyanosis.  Neuro: alert oriented×3  Neck: Tunnel catheter right side noted.  No sign of  infection or bleeding        Labs:    Results from last 7 days  Lab Units 10/20/18  0236 10/19/18  0659 " 10/17/18  0543  10/16/18  0802   WBC 10*3/mm3 13.95* 12.97*  --   --  12.46*   HEMOGLOBIN g/dL 8.5* 8.6* 9.0*  < > 6.9*   HEMATOCRIT % 27.0* 27.3* 29.0*  < > 22.8*   PLATELETS 10*3/mm3 197 205  --   --  210   < > = values in this interval not displayed.    Results from last 7 days  Lab Units 10/20/18  0236 10/19/18  0659 10/18/18  0638 10/16/18  0802 10/15/18  0625 10/14/18  1329   SODIUM mmol/L 141 142 142 143 144 144   POTASSIUM mmol/L 3.6 4.0 5.0 5.6* 5.8* 5.9*   CHLORIDE mmol/L 110* 111* 113* 115* 115* 119*   CO2 mmol/L 25.0 22.0 20.0 20.0 20.0 18.0*   BUN mg/dL 39* 56* 78* 78* 77* 73*   CREATININE mg/dL 2.47* 3.34* 4.50* 4.42* 4.33* 4.29*   CALCIUM mg/dL 8.4* 8.6* 8.7 8.2* 8.2* 8.7   PHOSPHORUS mg/dL  --  4.1 5.8* 6.4*  --   --    MAGNESIUM mg/dL  --   --   --  1.5 1.3  --    ALBUMIN g/dL  --  3.09* 3.20 3.12*  --  3.37       Results from last 7 days  Lab Units 10/20/18  0236   GLUCOSE mg/dL 141*         Results from last 7 days  Lab Units 10/18/18  0638   ALK PHOS U/L 81   BILIRUBIN mg/dL 0.3   BILIRUBIN DIRECT mg/dL 0.1   ALT (SGPT) U/L 13   AST (SGOT) U/L 12       Results from last 7 days  Lab Units 10/14/18  1851   PH, ARTERIAL pH units 7.186*   PO2 ART mm Hg 127.0*   PCO2, ARTERIAL mm Hg 48.1*   HCO3 ART mmol/L 18.2*         Results from last 7 days  Lab Units 10/14/18  1340   COLOR UA  Yellow   CLARITY UA  Turbid*   PH, URINE  6.0   SPECIFIC GRAVITY, URINE  1.020   GLUCOSE UA  Negative   KETONES UA  Negative   BILIRUBIN UA  Negative   PROTEIN UA  100 mg/dL (2+)*   BLOOD UA  Small (1+)*   LEUKOCYTES UA  Large (3+)*   NITRITE UA  Negative       Estimated Creatinine Clearance: 30.9 mL/min (A) (by C-G formula based on SCr of 2.47 mg/dL (H)).      Assessment      1.  Diabetic nephropathy biopsy proven: Now end-stage renal disease requiring dialysis.  2.  Hyperkalemia.  Corrected with dialysis.  3.  Anemia of chronic disease.  4.  Diabetes type 2.  5.  UTI: Severe sepsis,.  6.  Metabolic encephalopathy  improving.  7.volume overload    Plan;  Next dialysis on Monday with ultrafiltration.  Orders written  Home with outpatient dialysis is arranged at [facility] unit      Irineo Latham MD  10/21/18  1:14 PM          Electronically signed by Irineo Latham MD at 10/21/2018  1:20 PM          Physical Therapy Notes (last 72 hours) (Notes from 10/19/2018 10:06 AM through 10/22/2018 10:06 AM)      Dianna Jiang PT at 10/19/2018  2:15 PM  Version 1 of 1         Acute Care - Physical Therapy Treatment Note  [facility]     Patient Name: Joy Bueno  : 1950  MRN: [id number]  Today's Date: 10/19/2018  Onset of Illness/Injury or Date of Surgery: 10/14/18  Date of Referral to PT: 10/14/18  Referring Physician: ethan    Admit Date: 10/14/2018    Visit Dx:    ICD-10-CM ICD-9-CM   1. Acute UTI N39.0 599.0   2. History of ESBL Klebsiella pneumoniae infection Z86.19 V12.09   3. Acute renal failure superimposed on chronic kidney disease, unspecified CKD stage, unspecified acute renal failure type (CMS/[facility]) N17.9 584.9    N18.9 585.9   4. Anemia of chronic disease D63.8 285.29   5. Impaired functional mobility, balance, gait, and endurance Z74.09 V49.89   6. Impaired mobility and ADLs Z74.09 799.89     Patient Active Problem List   Diagnosis   • Asthma   • Hypertension   • Symptomatic anemia   • Morbid obesity (CMS/[facility])   • ESRD (end stage renal disease) (CMS/[facility])   • Anxiety   • Anemia   • CKD (chronic kidney disease) Stage 3   • Gastritis   • Pleural effusion, right   • Leukocytosis   • Possible pneumonia of RML/RLL   • Clostridium difficile diarrhea   • UTI (urinary tract infection)   • Bilateral leg pain   • Generalized weakness   • Abdominal pain   • Chronic heart failure (CMS/[facility])   • Anemia in stage 4 chronic kidney disease (CMS/[facility])   • CKD (chronic kidney disease) stage 4, GFR 15-29 ml/min (CMS/[facility])   • Diabetes mellitus, type II (CMS/[facility])   • History of UTI   • History of Clostridium difficile  infection   • Tinea capitis   • History of respiratory failure, since off home oxygen   • Anemia of chronic renal failure, stage 5 (CMS/[facility])   • Hyperkalemia   • Metabolic encephalopathy   • Severe sepsis (CMS/[facility])   • Sepsis (CMS/[facility])       Therapy Treatment          Rehabilitation Treatment Summary     Row Name 10/19/18 1415 10/19/18 1330          Treatment Time/Intention    Discipline physical therapist  -MITALI occupational therapist  -AN     Document Type therapy note (daily note)  -MITALI therapy note (daily note)  -AN     Subjective Information complains of;weakness  -MITALI complains of;pain  -AN     Mode of Treatment physical therapy  -MITALI occupational therapy  -AN     Patient/Family Observations  -- pt. supine in specialty bed  -AN     Care Plan Review care plan/treatment goals reviewed;risks/benefits reviewed;patient/other agree to care plan  -MITALI care plan/treatment goals reviewed;risks/benefits reviewed  -AN     Therapy Frequency (PT Clinical Impression) daily  -MITALI  --     Patient Effort adequate  -MITALI good  -AN     Comment  -- RN reports pt just back to bed, but ok to work bedside  -AN     Existing Precautions/Restrictions fall;oxygen therapy device and L/min  -MITALI  --     Recorded by [MITALI] Dianna Jiang, PT 10/19/18 1604 [AN] Sasha Dover, OT 10/19/18 1428     Row Name 10/19/18 1415 10/19/18 1330          Cognitive Assessment/Intervention- PT/OT    Affect/Mental Status (Cognitive) WFL  -MITALI WFL  -AN     Orientation Status (Cognition) oriented to;person;place  -MITALI oriented to;person;place  -AN     Follows Commands (Cognition) follows one step commands;over 90% accuracy  -MITALI follows one step commands;75-90% accuracy  -AN     Cognitive Function (Cognitive) safety deficit  -MITALI  --     Safety Deficit (Cognitive) awareness of need for assistance;insight into deficits/self awareness;safety precautions awareness;safety precautions follow-through/compliance  -MITALI  --     Personal Safety Interventions fall prevention  program maintained;gait belt;nonskid shoes/slippers when out of bed  -MITALI  --     Recorded by [MITALI] Dianna Jiang, PT 10/19/18 1604 [AN] Sasha Dover, OT 10/19/18 1428     Row Name 10/19/18 1415             Safety Issues, Functional Mobility    Safety Issues Affecting Function (Mobility) awareness of need for assistance;insight into deficits/self awareness;safety precaution awareness;safety precautions follow-through/compliance  -MITALI      Impairments Affecting Function (Mobility) endurance/activity tolerance;pain;strength;range of motion (ROM)  -MITALI      Recorded by [MITALI] Dianna Jiang, PT 10/19/18 1604      Row Name 10/19/18 1415 10/19/18 1330          Bed Mobility Assessment/Treatment    Rolling Left [place] (Bed Mobility) maximum assist (25% patient effort);2 person assist  -MITALI  --     Rolling Right [place] (Bed Mobility) maximum assist (25% patient effort);2 person assist  -MITALI  --     Scooting/Bridging [place] (Bed Mobility) dependent (less than 25% patient effort)  -MITALI  --     Bed Mobility, Safety Issues decreased use of arms for pushing/pulling;decreased use of legs for bridging/pushing  -MITALI  --     Assistive Device (Bed Mobility) draw sheet  -MITALI  --     Comment (Bed Mobility)  -- deferred  -AN     Recorded by [MITALI] Dianna Jiang, PT 10/19/18 1604 [AN] Sasha Dover, OT 10/19/18 1428     Row Name 10/19/18 1415 10/19/18 1330          Transfer Assessment/Treatment    Comment (Transfers) deferred patient just back to bed per RN  -MITALI deferred  -AN     Recorded by [MITALI] Dianna Jiang, PT 10/19/18 1604 [AN] Sasha Dover, OT 10/19/18 1428     Row Name 10/19/18 1415             Gait/Stairs Assessment/Training    Comment (Gait/Stairs) patient is non ambulatory  -MITALI      Recorded by [MITALI] Dianna Jiang, PT 10/19/18 1604      Row Name 10/19/18 1330             Grooming Assessment/Training    [place] Level (Grooming) wash face, hands;oral care regimen;minimum assist (75% patient  effort)  -AN      Grooming Position sitting up in bed  -AN      Recorded by [AN] Sasha Dover, OT 10/19/18 1428      Row Name 10/19/18 1415 10/19/18 1330          Motor Skills Assessment/Interventions    Additional Documentation Therapeutic Exercise Interventions (Group)  -MITALI Therapeutic Exercise (Group)  -AN     Recorded by [MITALI] Dianna Jiang, PT 10/19/18 1604 [AN] Sasha Dover, OT 10/19/18 1428     Row Name 10/19/18 1415 10/19/18 1330          Therapeutic Exercise    Therapeutic Exercise supine, upper extremities;supine, lower extremities  -MITALI  --     Additional Documentation Therapeutic Exercise (Row)  -MITALI  --     78857 - PT Therapeutic Exercise Minutes 14  -MITALI  --     69598 - OT Therapeutic Activity Minutes  -- 8  -AN     Recorded by [MITALI] Dianna Jiang, PT 10/19/18 1604 [AN] Sasha Dover, OT 10/19/18 1428     Row Name 10/19/18 1415             Upper Extremity Supine Therapeutic Exercise    Performed, Supine Upper Extremity (Therapeutic Exercise) shoulder flexion/extension;shoulder abduction/adduction;elbow flexion/extension  -MITALI      Exercise Type, Supine Upper Extremity (Therapeutic Exercise) AAROM (active assistive range of motion)  -MITALI      Sets/Reps Detail, Supine Upper Extremity (Therapeutic Exercise) 1/10  -MITALI      Recorded by [MITALI] Dianna Jiang, PT 10/19/18 1604      Row Name 10/19/18 1415             Lower Extremity Supine Therapeutic Exercise    Performed, Supine Lower Extremity (Therapeutic Exercise) hip flexion/extension;hip abduction/adduction;knee flexion/extension;ankle dorsiflexion/plantarflexion;ankle pumps;heel slides  -MITALI      Exercise Type, Supine Lower Extremity (Therapeutic Exercise) AAROM (active assistive range of motion)  -MITALI      Sets/Reps Detail, Supine Lower Extremity (Therapeutic Exercise) 1/10  -MITALI      Recorded by [MITALI] Dianna Jiang, PT 10/19/18 1604      Row Name 10/19/18 1330             Therapeutic Exercise    Upper Extremity Range of Motion (Therapeutic  Exercise) elbow flexion/extension, bilateral;forearm supination/pronation, bilateral;wrist flexion/extension, bilateral;shoulder horizontal abduction/adduction, bilateral  -AN      Sets/Reps (Therapeutic Exercise) 1/8-10  -AN      Recorded by [AN] Sasha Dover, OT 10/19/18 1428      Row Name 10/19/18 1415 10/19/18 1330          Positioning and Restraints    Pre-Treatment Position in bed  -MITALI in bed  -AN     Post Treatment Position bed  -MITALI bed  -AN     In Bed notified nsg;supine;call light within reach;exit alarm on  -MITALI supine;call light within reach;encouraged to call for assist  -AN     Recorded by [MITALI] Dianna Jiang, PT 10/19/18 1604 [AN] Sasha Dover, OT 10/19/18 1428     Row Name 10/19/18 1415 10/19/18 1330          Pain Scale: Numbers Pre/Post-Treatment    Pain Scale: Numbers, Pretreatment 0/10 - no pain  -MITALI 6/10  -AN     Pain Scale: Numbers, Post-Treatment 3/10  -MITALI 6/10  -AN     Pain Location - Side Right  -MITALI Bilateral  -AN     Pain Location - Orientation upper  -MITALI  --     Pain Location extremity  -MITALI perineum  -AN     Pain Intervention(s) Repositioned  -MITALI --   notified RN  -AN     Recorded by [MITALI] Dianna Jiang, PT 10/19/18 1604 [AN] Sasha Dover, OT 10/19/18 1428     Row Name                Wound 10/17/18 1855 Other (See comments) chest incision    Wound - Properties Group Date first assessed: 10/17/18 [SS] Time first assessed: 1855 [SS] Side: Other (See comments) [SS] Location: chest [SS] Type: incision [SS] Recorded by:  [SS] Donna Dejesus RN 10/17/18 1855    Row Name 10/19/18 1415             Coping    Observed Emotional State cooperative  -MITALI      Verbalized Emotional State acceptance  -MITALI      Recorded by [MITALI] Dianna Jiang, PT 10/19/18 1604      Row Name 10/19/18 1415 10/19/18 1330          Plan of Care Review    Plan of Care Reviewed With patient  -MITALI patient  -AN     Recorded by [MITALI] Dianna Jiang, PT 10/19/18 1604 [AN] Sasha Dover, OT 10/19/18 1429      Row Name 10/19/18 1330             Outcome Summary/Treatment Plan (OT)    Daily Summary of Progress (OT) progress toward functional goals as expected  -AN      Recorded by [AN] Sasha Dover, OT 10/19/18 1428      Row Name 10/19/18 1415             Outcome Summary/Treatment Plan (PT)    Daily Summary of Progress (PT) progress toward functional goals is gradual  -MITALI      Anticipated Discharge Disposition (PT) skilled nursing facility  -MITALI      Recorded by [MITALI] Dianna Jiang, PT 10/19/18 1604        User Key  (r) = Recorded By, (t) = Taken By, (c) = Cosigned By    Initials Name Effective Dates Discipline    MITALI Dianna Jiang, PT 06/19/15 -  PT    AN Sasha Dover, OT 06/22/15 -  OT    SS Donna Dejesus RN 01/15/18 -  --          Wound 10/17/18 1855 Other (See comments) chest incision (Active)   Dressing Appearance intact;dried drainage 10/19/2018 10:59 AM   Closure Adhesive bandage 10/19/2018 10:59 AM   Drainage Amount scant 10/18/2018  8:00 PM   Dressing Care, Wound antimicrobial agent applied 10/18/2018  8:00 PM             Physical Therapy Education     Title: PT OT SLP Therapies (Active)     Topic: Physical Therapy (Active)     Point: Mobility training (Active)    Learning Progress Summary     Learner Status Readiness Method Response Comment Documented by    Patient Active Acceptance E NR  MITALI 10/19/18 1415     Active Acceptance E NR  KR 10/15/18 1312          Point: Home exercise program (Active)    Learning Progress Summary     Learner Status Readiness Method Response Comment Documented by    Patient Active Acceptance E NR  MITALI 10/19/18 1415     Active Acceptance E NR  KR 10/15/18 1312          Point: Body mechanics (Active)    Learning Progress Summary     Learner Status Readiness Method Response Comment Documented by    Patient Active Acceptance E NR  MITALI 10/19/18 1415     Active Acceptance E NR  KR 10/15/18 1312          Point: Precautions (Active)    Learning Progress Summary     Learner  Status Readiness Method Response Comment Documented by    Patient Active Acceptance E NR  MITALI 10/19/18 1415     Active Acceptance E NR  KR 10/15/18 1312                      User Key     Initials Effective Dates Name Provider Type Discipline    MITALI 06/19/15 -  Dianna Jiang, PT Physical Therapist PT    KR 04/03/18 -  Tessa Napier, PT Physical Therapist PT                    PT Recommendation and Plan  Anticipated Discharge Disposition (PT): skilled nursing facility  Therapy Frequency (PT Clinical Impression): daily  Outcome Summary/Treatment Plan (PT)  Daily Summary of Progress (PT): progress toward functional goals is gradual  Anticipated Discharge Disposition (PT): skilled nursing facility  Plan of Care Reviewed With: patient  Outcome Summary: patient seen in bed do to fatigue from dialysis she performed all UE and LE exercise with assist           Outcome Measures     Row Name 10/19/18 1415 10/19/18 1330          How much help from another person do you currently need...    Turning from your back to your side while in flat bed without using bedrails? 2  -MITALI  --     Moving from lying on back to sitting on the side of a flat bed without bedrails? 1  -MITALI  --     Moving to and from a bed to a chair (including a wheelchair)? 1  -MITALI  --     Standing up from a chair using your arms (e.g., wheelchair, bedside chair)? 1  -MITALI  --     Climbing 3-5 steps with a railing? 1  -MITALI  --     To walk in hospital room? 1  -MITALI  --     AM-PAC 6 Clicks Score 7  -MITALI  --        How much help from another is currently needed...    Putting on and taking off regular lower body clothing?  -- 1  -AN     Bathing (including washing, rinsing, and drying)  -- 2  -AN     Toileting (which includes using toilet bed pan or urinal)  -- 1  -AN     Putting on and taking off regular upper body clothing  -- 2  -AN     Taking care of personal grooming (such as brushing teeth)  -- 3  -AN     Eating meals  -- 3  -AN     Score  -- 12  -AN       User Key   (r) = Recorded By, (t) = Taken By, (c) = Cosigned By    Initials Name Provider Type    Dianna Mendoza PT Physical Therapist    Sasha Fry, OT Occupational Therapist           Time Calculation:         PT Charges     Row Name 10/19/18 1415             Time Calculation    Start Time 1415  -MITALI      PT Received On 10/19/18  -MITALI      PT Goal Re-Cert Due Date 10/25/18  -MITALI         Time Calculation- PT    Total Timed Code Minutes- PT 14 minute(s)  -MITALI         Timed Charges    72085 - PT Therapeutic Exercise Minutes 14  -MITALI        User Key  (r) = Recorded By, (t) = Taken By, (c) = Cosigned By    Initials Name Provider Type    Dianna Mendoza, PT Physical Therapist        Therapy Suggested Charges     Code   Minutes Charges    13786 (CPT®) Hc Pt Neuromusc Re Education Ea 15 Min      36784 (CPT®) Hc Pt Ther Proc Ea 15 Min 14 1    58288 (CPT®) Hc Gait Training Ea 15 Min      48009 (CPT®) Hc Pt Therapeutic Act Ea 15 Min      04827 (CPT®) Hc Pt Manual Therapy Ea 15 Min      03144 (CPT®) Hc Pt Iontophoresis Ea 15 Min      07519 (CPT®) Hc Pt Elec Stim Ea-Per 15 Min      72570 (CPT®) Hc Pt Ultrasound Ea 15 Min      68557 (CPT®) Hc Pt Self Care/Mgmt/Train Ea 15 Min      99178 (CPT®) Hc Pt Prosthetic (S) Train Initial Encounter, Each 15 Min      99905 (CPT®) Hc Pt Orthotic(S)/Prosthetic(S) Encounter, Each 15 Min      54506 (CPT®) Hc Orthotic(S) Mgmt/Train Initial Encounter, Each 15min      Total  14 1        Therapy Charges for Today     Code Description Service Date Service Provider Modifiers Qty    36206493125 HC PT THER PROC EA 15 MIN 10/19/2018 Dianna Jiang, PT GP 1          PT G-Codes  Outcome Measure Options: AM-PAC 6 Clicks Daily Activity (OT)  AM-PAC 6 Clicks Score: 7  Score: 12    Dianna Jiang PT  10/19/2018         Electronically signed by Dianna Jiang PT at 10/19/2018  4:06 PM     Dianna Jiang PT at 10/19/2018  4:05 PM  Version 1 of 1         Problem: Patient Care  Overview  Goal: Plan of Care Review  Outcome: Ongoing (interventions implemented as appropriate)   10/19/18 1415   Coping/Psychosocial   Plan of Care Reviewed With patient   OTHER   Outcome Summary patient seen in bed do to fatigue from dialysis she performed all UE and LE exercise with assist            Electronically signed by Dianna Jiang PT at 10/19/2018  4:05 PM          Occupational Therapy Notes (most recent note)      Sasha Dover, OT at 10/19/2018  2:32 PM          Acute Care - Occupational Therapy Treatment Note   [place]     Patient Name: Joy Bueno  : 1950  MRN: [id number]  Today's Date: 10/19/2018  Onset of Illness/Injury or Date of Surgery: 10/14/18  Date of Referral to OT: 10/14/18  Referring Physician: ethan    Admit Date: 10/14/2018       ICD-10-CM ICD-9-CM   1. Acute UTI N39.0 599.0   2. History of ESBL Klebsiella pneumoniae infection Z86.19 V12.09   3. Acute renal failure superimposed on chronic kidney disease, unspecified CKD stage, unspecified acute renal failure type (CMS/[facility]) N17.9 584.9    N18.9 585.9   4. Anemia of chronic disease D63.8 285.29   5. Impaired functional mobility, balance, gait, and endurance Z74.09 V49.89   6. Impaired mobility and ADLs Z74.09 799.89     Patient Active Problem List   Diagnosis   • Asthma   • Hypertension   • Symptomatic anemia   • Morbid obesity (CMS/[facility])   • ESRD (end stage renal disease) (CMS/[facility])   • Anxiety   • Anemia   • CKD (chronic kidney disease) Stage 3   • Gastritis   • Pleural effusion, right   • Leukocytosis   • Possible pneumonia of RML/RLL   • Clostridium difficile diarrhea   • UTI (urinary tract infection)   • Bilateral leg pain   • Generalized weakness   • Abdominal pain   • Chronic heart failure (CMS/[facility])   • Anemia in stage 4 chronic kidney disease (CMS/[facility])   • CKD (chronic kidney disease) stage 4, GFR 15-29 ml/min (CMS/[facility])   • Diabetes mellitus, type II (CMS/[facility])   • History of UTI   • History of  Clostridium difficile infection   • Tinea capitis   • History of respiratory failure, since off home oxygen   • Anemia of chronic renal failure, stage 5 (CMS/[facility])   • Hyperkalemia   • Metabolic encephalopathy   • Severe sepsis (CMS/[facility])   • Sepsis (CMS/[facility])     Past Medical History:   Diagnosis Date   • Anemia    • Anxiety    • Asthma    • Chronic kidney disease    • Diabetes mellitus (CMS/[facility])    • Diabetes mellitus, type II (CMS/[facility]) 8/3/2018   • History of Clostridium difficile infection 8/3/2018   • History of respiratory failure, since off home oxygen 8/3/2018   • History of UTI 8/3/2018   • Hypertension    • Morbid obesity (CMS/[facility])    • Osteoarthritis    • Tinea capitis 8/3/2018     Past Surgical History:   Procedure Laterality Date   •  SECTION     • COLONOSCOPY N/A 2017    Procedure: COLONOSCOPY;  Surgeon: Mark I Brunner, MD;  Location:  [facility];  Service:    • ENDOSCOPY N/A 2017    Procedure: ESOPHAGOGASTRODUODENOSCOPY ;  Surgeon: Velasquez Call MD;  Location:  [facility];  Service:    • HERNIA REPAIR     • INSERTION HEMODIALYSIS CATHETER Right 10/17/2018    Procedure: HEMODIALYSIS CATHETER INSERTION;  Surgeon: Carlos Enrique Calderón MD;  Location:  [facility];  Service: General       Therapy Treatment          Rehabilitation Treatment Summary     Row Name 10/19/18 3805             Treatment Time/Intention    Discipline occupational therapist  -AN      Document Type therapy note (daily note)  -AN      Subjective Information complains of;pain  -AN      Mode of Treatment occupational therapy  -AN      Patient/Family Observations pt. supine in specialty bed  -AN      Care Plan Review care plan/treatment goals reviewed;risks/benefits reviewed  -AN      Patient Effort good  -AN      Comment RN reports pt just back to bed, but ok to work bedside  -AN      Recorded by [AN] Sasha Dover OT 10/19/18 9752      Row Name 10/19/18 5897             Cognitive Assessment/Intervention-  PT/OT    Affect/Mental Status (Cognitive) WFL  -AN      Orientation Status (Cognition) oriented to;person;place  -AN      Follows Commands (Cognition) follows one step commands;75-90% accuracy  -AN      Recorded by [AN] Sasha Dover OT 10/19/18 1428      Row Name 10/19/18 1330             Bed Mobility Assessment/Treatment    Comment (Bed Mobility) deferred  -AN      Recorded by [AN] Sasha Dover OT 10/19/18 1428      Row Name 10/19/18 1330             Transfer Assessment/Treatment    Comment (Transfers) deferred  -AN      Recorded by [AN] Sasha Dover, OT 10/19/18 1428      Row Name 10/19/18 1330             Grooming Assessment/Training    [place] Level (Grooming) wash face, hands;oral care regimen;minimum assist (75% patient effort)  -AN      Grooming Position sitting up in bed  -AN      Recorded by [AN] Sasha Dover OT 10/19/18 1428      Row Name 10/19/18 1330             Motor Skills Assessment/Interventions    Additional Documentation Therapeutic Exercise (Group)  -AN      Recorded by [AN] Sasha Dover OT 10/19/18 1428      Row Name 10/19/18 1330             Therapeutic Exercise    64447 - OT Therapeutic Activity Minutes 8  -AN      Recorded by [AN] Sasha Dover OT 10/19/18 1428      Row Name 10/19/18 1330             Therapeutic Exercise    Upper Extremity Range of Motion (Therapeutic Exercise) elbow flexion/extension, bilateral;forearm supination/pronation, bilateral;wrist flexion/extension, bilateral;shoulder horizontal abduction/adduction, bilateral  -AN      Sets/Reps (Therapeutic Exercise) 1/8-10  -AN      Recorded by [AN] Sasha Dover OT 10/19/18 1428      Row Name 10/19/18 1330             Positioning and Restraints    Pre-Treatment Position in bed  -AN      Post Treatment Position bed  -AN      In Bed supine;call light within reach;encouraged to call for assist  -AN      Recorded by [AN] Sasha Dover OT 10/19/18 1428      Row Name 10/19/18 1330             Pain Scale: Numbers  Pre/Post-Treatment    Pain Scale: Numbers, Pretreatment 6/10  -AN      Pain Scale: Numbers, Post-Treatment 6/10  -AN      Pain Location - Side Bilateral  -AN      Pain Location perineum  -AN      Pain Intervention(s) --   notified RN  -AN      Recorded by [AN] Sasha Dover, OT 10/19/18 1428      Row Name                Wound 10/17/18 1855 Other (See comments) chest incision    Wound - Properties Group Date first assessed: 10/17/18 [SS] Time first assessed: 1855 [SS] Side: Other (See comments) [SS] Location: chest [SS] Type: incision [SS] Recorded by:  [SS] Donna Dejesus RN 10/17/18 1855    Row Name 10/19/18 1330             Plan of Care Review    Plan of Care Reviewed With patient  -AN      Recorded by [AN] Sasha Dover OT 10/19/18 1428      Row Name 10/19/18 1330             Outcome Summary/Treatment Plan (OT)    Daily Summary of Progress (OT) progress toward functional goals as expected  -AN      Recorded by [AN] Sasha Dover, OT 10/19/18 1428        User Key  (r) = Recorded By, (t) = Taken By, (c) = Cosigned By    Initials Name Effective Dates Discipline    AN Sasha Dover, OT 06/22/15 -  OT    SS Donna Dejesus RN 01/15/18 -  --        Wound 10/17/18 1855 Other (See comments) chest incision (Active)   Dressing Appearance intact;dried drainage 10/19/2018 10:59 AM   Closure Adhesive bandage 10/19/2018 10:59 AM   Drainage Amount scant 10/18/2018  8:00 PM   Dressing Care, Wound antimicrobial agent applied 10/18/2018  8:00 PM         Occupational Therapy Education     Title: PT OT SLP Therapies (Active)     Topic: Occupational Therapy (Done)     Point: ADL training (Done)     Description: Instruct learner(s) on proper safety adaptation and remediation techniques during self care or transfers.   Instruct in proper use of assistive devices.   Learning Progress Summary     Learner Status Readiness Method Response Comment Documented by    Patient Done Acceptance E VU,NR Educated on benefits of  therapeutic ex and activities. AN 10/19/18 1429     Done Acceptance E VU,NR Education initiated for benefits of OOB activity/therapy, role of OT, and HEP  10/16/18 1439          Point: Home exercise program (Done)     Description: Instruct learner(s) on appropriate technique for monitoring, assisting and/or progressing therapeutic exercises/activities.   Learning Progress Summary     Learner Status Readiness Method Response Comment Documented by    Patient Done Acceptance E VU,NR Educated on benefits of therapeutic ex and activities. AN 10/19/18 1429     Done Acceptance E VU,NR Education initiated for benefits of OOB activity/therapy, role of OT, and HEP  10/16/18 1435                      User Key     Initials Effective Dates Name Provider Type Discipline     06/08/18 -  Aranza Manzano, OT Occupational Therapist OT     06/22/15 -  Sasha Dover OT Occupational Therapist OT                OT Recommendation and Plan  Outcome Summary/Treatment Plan (OT)  Daily Summary of Progress (OT): progress toward functional goals as expected  Daily Summary of Progress (OT): progress toward functional goals as expected  Plan of Care Review  Plan of Care Reviewed With: patient  Plan of Care Reviewed With: patient  Outcome Summary: Pt seen bedside due to recent pain and txfr. Attended to some basic ADL's and limited exercise. Continue OT frequency.         Outcome Measures     Row Name 10/19/18 4957             How much help from another is currently needed...    Putting on and taking off regular lower body clothing? 1  -AN      Bathing (including washing, rinsing, and drying) 2  -AN      Toileting (which includes using toilet bed pan or urinal) 1  -AN      Putting on and taking off regular upper body clothing 2  -AN      Taking care of personal grooming (such as brushing teeth) 3  -AN      Eating meals 3  -AN      Score 12  -AN        User Key  (r) = Recorded By, (t) = Taken By, (c) = Cosigned By    Initials Name  Provider Type    Sasha Fry OT Occupational Therapist           Time Calculation:         Time Calculation- OT     Row Name 10/19/18 1431 10/19/18 1330          Time Calculation- OT    OT Start Time 1330  -AN  --     Total Timed Code Minutes- OT 8 minute(s)  -AN  --     OT Received On 10/19/18  -AN  --     OT Goal Re-Cert Due Date 10/26/18  -AN  --        Timed Charges    24998 - OT Therapeutic Activity Minutes  -- 8  -AN       User Key  (r) = Recorded By, (t) = Taken By, (c) = Cosigned By    Initials Name Provider Type    Sasha Fry OT Occupational Therapist           Therapy Suggested Charges     Code   Minutes Charges    12882 (CPT®) Hc Ot Neuromusc Re Education Ea 15 Min      73493 (CPT®) Hc Ot Ther Proc Ea 15 Min      15419 (CPT®) Hc Ot Therapeutic Act Ea 15 Min 8 1    85891 (CPT®) Hc Ot Manual Therapy Ea 15 Min      46622 (CPT®) Hc Ot Iontophoresis Ea 15 Min      69520 (CPT®) Hc Ot Elec Stim Ea-Per 15 Min      50112 (CPT®) Hc Ot Ultrasound Ea 15 Min      85257 (CPT®) Hc Ot Self Care/Mgmt/Train Ea 15 Min      Total  8 1        Therapy Charges for Today     Code Description Service Date Service Provider Modifiers Qty    5419519 HC OT THERAPEUTIC ACT EA 15 MIN 10/19/2018 Sasha Dover OT GO 1               Sasha Dover OT  10/19/2018    Electronically signed by Sasha Dover OT at 10/19/2018  2:32 PM

## 2018-10-22 NOTE — ANESTHESIA POSTPROCEDURE EVALUATION
Patient: Joy Bueno    Procedure Summary     Date:  10/22/18 Room / Location:   [facility] /  [facility]    Anesthesia Start:  1455 Anesthesia Stop:      Procedure:  LEFT UPPER EXTREMITY ARTERIOVENOUS FISTULA CREATION (Left ) Diagnosis:      Surgeon:  Carlos Enrique Calderón MD Provider:  Shayne Patel MD    Anesthesia Type:  regional, MAC ASA Status:  3          Anesthesia Type: regional, MAC  Last vitals  BP   101/58 (10/22/18 1747)   Temp   97.6 °F (36.4 °C) (10/22/18 1747)   Pulse   80 (10/22/18 1747)   Resp   20 (10/22/18 1747)     SpO2   100 % (10/22/18 1747)     Post Anesthesia Care and Evaluation    Patient location during evaluation: PACU  Patient participation: complete - patient participated  Level of consciousness: awake  Pain score: 0  Pain management: adequate  Airway patency: patent  Anesthetic complications: No anesthetic complications    Cardiovascular status: stable  Respiratory status: acceptable, nasal cannula and spontaneous ventilation  Hydration status: stable    Comments: Pt transferred to PACU with O2. Vital signs stable. Report to PACU RN and care accepted.

## 2018-10-22 NOTE — PROGRESS NOTES
INFECTIOUS DISEASE PROGRESS NOTE    Joy Bueno  1950  [id number]      Admission Date: 10/14/2018      Requesting Provider: No ref. provider found  Evaluating Physician: SUELLEN Chambers    Reason for Consultation:  ESBL Ecoli  UTI      History of present illness:    10/15/18: Patient is a 68 y.o. female seen today for a UTI.  She was brought from her nursing home with increasing lethargy, and  confusion over the past several days.  She was hospitalized here in August and treated with Invanz for an ESBL Ecoli UTI.  On admission, her urinalysis had too  Numerous to count white cells, and urine culture now with Gram negative rods . She is hypothermic,  With a leukocytosis of 14, 000,  hgb of 7.0, Scr 4.29. Ertapenem was initiated and we were consulted for evaluation and treatment.    10/16/18: She denies nausea and vomiting.  She has decreased dyspnea.  She is scheduled for hemodialysis access by Dr. Calderón.  I discussed her situation with Dr. Calderón today.  10/17/18:  She is now on nasal cannula.  She will  Have her dialysis access later today.  Remains afebrile.   10/18/18: She denies dysuria.  The nursing staff indicates she had approximately 4–5 100 cc of urine out over the day.  She underwent dialysis access placement yesterday.  She has remained afebrile.  10/19/18:  She is currently on HD.  She is more alert this am.  Complaining of hemorrhoids.  She remains afebrile.   10/22/18:  She thinks she is going back to the nursing home tomorrow.  She denies n/v/d.     Past Medical History:   Diagnosis Date   • Anemia    • Anxiety    • Asthma    • Chronic kidney disease    • Diabetes mellitus (CMS/[facility])    • Diabetes mellitus, type II (CMS/[facility]) 8/3/2018   • History of Clostridium difficile infection 8/3/2018   • History of respiratory failure, since off home oxygen 8/3/2018   • History of UTI 8/3/2018   • Hypertension    • Morbid obesity (CMS/[facility])    • Osteoarthritis    • Tinea capitis 8/3/2018        Past Surgical History:   Procedure Laterality Date   •  SECTION     • COLONOSCOPY N/A 2017    Procedure: COLONOSCOPY;  Surgeon: Mark I Brunner, MD;  Location:  [facility];  Service:    • ENDOSCOPY N/A 2017    Procedure: ESOPHAGOGASTRODUODENOSCOPY ;  Surgeon: Velasquez Call MD;  Location:  [facility];  Service:    • HERNIA REPAIR     • INSERTION HEMODIALYSIS CATHETER Right 10/17/2018    Procedure: HEMODIALYSIS CATHETER INSERTION;  Surgeon: Carlos Enrique Calderón MD;  Location:  [facility];  Service: General       Family History   Problem Relation Age of Onset   • Cardiomyopathy Mother    • Stroke Father    • Diabetes Father        Social History     Social History   • Marital status:      Spouse name: N/A   • Number of children: N/A   • Years of education: N/A     Occupational History   • Not on file.     Social History Main Topics   • Smoking status: Former Smoker     Packs/day: 0.25   • Smokeless tobacco: Not on file      Comment: unknown when quit, maybe last year   • Alcohol use No   • Drug use: No   • Sexual activity: No     Other Topics Concern   • Not on file     Social History Narrative    Mrs. Bueno is a 68 year old AA  female. She lives alone in [place] but has been in rehab for some time. She has a daughter. She does not have an advanced directive.       Allergies   Allergen Reactions   • Geodon [Ziprasidone Hcl] Unknown (See Comments)     PT DOESN'T REMEMBER   • Haldol [Haloperidol Lactate] Unknown (See Comments)     PT DOESN'T REMEMBER   • Seroquel [Quetiapine Fumarate] Unknown (See Comments)     PT DOESN'T REMEMBER         Medication:    Current Facility-Administered Medications:   •  acetaminophen (TYLENOL) tablet 650 mg, 650 mg, Oral, Q4H PRN, Doris Heard, APRN, 650 mg at 10/21/18 2208  •  amLODIPine (NORVASC) tablet 10 mg, 10 mg, Oral, Daily, Doris Heard, APRN, 10 mg at 10/21/18 0805  •  atorvastatin (LIPITOR) tablet 10 mg, 10 mg, Oral,  Nightly, Doris Heard APRN, 10 mg at 10/21/18 2006  •  budesonide-formoterol (SYMBICORT) 160-4.5 MCG/ACT inhaler 2 puff, 2 puff, Inhalation, BID - RT, Doris Heard APRN, 2 puff at 10/21/18 1908  •  carvedilol (COREG) tablet 25 mg, 25 mg, Oral, BID With Meals, Doris Heard APRN, 25 mg at 10/21/18 1712  •  dextrose (D50W) 25 g/ 50mL Intravenous Solution 25 g, 25 g, Intravenous, Q15 Min PRN, Doris Heard APRN  •  dextrose (GLUTOSE) oral gel 15 g, 15 g, Oral, Q15 Min PRN, Doris Heard APRN  •  epoetin porter (EPOGEN,PROCRIT) injection 20,000 Units, 20,000 Units, Subcutaneous, Once per day on Mon Wed Fri, Rocky Nova MD, 20,000 Units at 10/19/18 0907  •  glucagon (human recombinant) (GLUCAGEN DIAGNOSTIC) injection 1 mg, 1 mg, Subcutaneous, PRN, Doris Heard APRN  •  heparin (porcine) 5000 UNIT/ML injection 5,000 Units, 5,000 Units, Subcutaneous, Q12H, Doris Heard APRN, 5,000 Units at 10/21/18 2006  •  heparin (porcine) injection 1,900 Units, 1,900 Units, Intracatheter, PRN, Irineo Latham MD, 1,900 Units at 10/20/18 0933  •  hydrALAZINE (APRESOLINE) tablet 100 mg, 100 mg, Oral, TID, Doris Heard APRN, 100 mg at 10/21/18 2006  •  hydrocortisone (ANUSOL-HC) 2.5 % rectal cream, , Rectal, BID PRN, Tash Pate APRN  •  Influenza Vac Subunit Quad (FLUCELVAX) injection 0.5 mL, 0.5 mL, Intramuscular, During Hospitalization, Paula Silver MD  •  insulin lispro (humaLOG) injection 0-7 Units, 0-7 Units, Subcutaneous, 4x Daily With Meals & Nightly, Doris Heard APRN, 2 Units at 10/21/18 2131  •  isosorbide mononitrate (IMDUR) 24 hr tablet 30 mg, 30 mg, Oral, Daily, Doris Heard APRN, 30 mg at 10/21/18 0805  •  lactated ringers infusion, 9 mL/hr, Intravenous, Continuous, Jesus Benavides MD, Stopped at 10/17/18 2001  •  ondansetron (ZOFRAN) tablet 4 mg, 4 mg, Oral, Q6H PRN **OR** ondansetron (ZOFRAN) injection 4 mg, 4 mg, Intravenous, Q6H PRN, Félix,  SUELLEN Churchill  •  pantoprazole (PROTONIX) EC tablet 40 mg, 40 mg, Oral, Q AM, Doris Heard APRN, 40 mg at 10/21/18 0533  •  phenylephrine-mineral oil-petrolatum 0.25-14-74.9 % hemorhoidal ointment, , Rectal, TID PRN, Sara Krause MD  •  polyethylene glycol 3350 powder (packet), 17 g, Oral, Daily, Doris Heard APRN, 17 g at 10/20/18 0931  •  saccharomyces boulardii (FLORASTOR) capsule 250 mg, 250 mg, Oral, BID, Doris Heard APRN, 250 mg at 10/21/18 2006  •  sertraline (ZOLOFT) tablet 50 mg, 50 mg, Oral, Daily, Doris Heard APRN, 50 mg at 10/21/18 0805  •  sodium bicarbonate tablet 1,300 mg, 1,300 mg, Oral, BID, Rocky Nova MD, 1,300 mg at 10/21/18 2006  •  sodium chloride 0.9 % flush 3 mL, 3 mL, Intravenous, Q12H, Doris Heard APRN, 3 mL at 10/18/18 2122  •  sodium chloride 0.9 % flush 3-10 mL, 3-10 mL, Intravenous, PRN, Doris Heard APRN    Antibiotics:  Anti-Infectives     Ordered     Dose/Rate Route Frequency Start Stop    10/14/18 1512  ertapenem (INVanz) 1 g/100 mL 0.9% NS VTB (mbp)     Ordering Provider:  Raheel Brunson PA    1 g  over 30 Minutes Intravenous Once 10/14/18 1514 10/14/18 1701            Review of Systems:  No reliable ROS from patient     Physical Exam:   Vital Signs  Temp (24hrs), Av.7 °F (36.5 °C), Min:96.8 °F (36 °C), Max:98.5 °F (36.9 °C)    Temp  Min: 96.8 °F (36 °C)  Max: 98.5 °F (36.9 °C)  BP  Min: 149/74  Max: 171/74  Pulse  Min: 68  Max: 77  Resp  Min: 18  Max: 20  SpO2  Min: 95 %  Max: 98 %    GENERAL alert .  She is in no acute distress..   HEENT: Normocephalic, atraumatic.  PERRL. EOMI. No conjunctival injection. No icterus. Oropharynx clear without evidence of thrush or exudate.   NECK: Supple without nuchal rigidity. No mass.  LYMPH: No cervical, axillary or inguinal lymphadenopathy.  HEART: RRR; No murmur, rubs, gallops.   LUNGS: fairly clear   ABDOMEN: Soft, nontender,firm,  bowel sounds quiet  No rebound or guarding.  NO mass or HSM.  EXT:  No cyanosis, clubbing or edema. No cord.  : Genitalia generally unremarkable. purewick in place   MSK: FROM without joint effusions noted arms/legs.    SKIN: Warm and dry  Scalp eczema,   Neuro:  Alert  Right Rian site ok.    Laboratory Data      Results from last 7 days  Lab Units 10/20/18  0236 10/19/18  0659 10/17/18  0543  10/16/18  0802   WBC 10*3/mm3 13.95* 12.97*  --   --  12.46*   HEMOGLOBIN g/dL 8.5* 8.6* 9.0*  < > 6.9*   HEMATOCRIT % 27.0* 27.3* 29.0*  < > 22.8*   PLATELETS 10*3/mm3 197 205  --   --  210   < > = values in this interval not displayed.    Results from last 7 days  Lab Units 10/20/18  0236   SODIUM mmol/L 141   POTASSIUM mmol/L 3.6   CHLORIDE mmol/L 110*   CO2 mmol/L 25.0   BUN mg/dL 39*   CREATININE mg/dL 2.47*   GLUCOSE mg/dL 141*   CALCIUM mg/dL 8.4*       Results from last 7 days  Lab Units 10/18/18  0638   ALK PHOS U/L 81   BILIRUBIN mg/dL 0.3   BILIRUBIN DIRECT mg/dL 0.1   ALT (SGPT) U/L 13   AST (SGOT) U/L 12           UA  TNTC WBCS              Estimated Creatinine Clearance: 30.9 mL/min (A) (by C-G formula based on SCr of 2.47 mg/dL (H)).      Microbiology:  Blood Culture   Date Value Ref Range Status   10/14/2018 No growth at less than 24 hours  Preliminary   10/14/2018 No growth at less than 24 hours  Preliminary                    Urine Culture   Date Value Ref Range Status   10/14/2018 >100,000 CFU/mL Gram Negative Bacilli (A)  Preliminary      Urine culture is growing ESBL Klebsiella        Radiology:  Imaging Results (last 72 hours)     Procedure Component Value Units Date/Time    XR Chest 2 View [[id number]] Collected:  10/14/18 1443     Updated:  10/14/18 1653    Narrative:          EXAMINATION: XR CHEST 2 VW - 10/14/2018     INDICATION: Lethargy.      COMPARISON: None.     FINDINGS: Two-view chest reveals the heart to be enlarged. Underlying  chronic changes seen throughout the lung fields bilaterally. No evidence  of acute parenchymal disease.  Pulmonary vascularity is pronounced. No  focal parenchymal opacification present. Degenerative change is seen  within the spine.           Impression:       Low lung volumes with chronic changes seen within the lung  fields, enlargement heart and no evidence of acute parenchymal disease.     DICTATED:   10/14/2018  EDITED/ls :   10/14/2018      This report was finalized on 10/14/2018 4:51 PM by Dr. Rosalie Kline MD.       CT Head Without Contrast [[id number]] Collected:  10/14/18 1441     Updated:  10/14/18 1652    Narrative:       EXAMINATION: CT HEAD WO CONTRAST - 10/12/2018     INDICATION: Lethargy, weakness.     TECHNIQUE: Multiple axial CT imaging is obtained of the head from skull  base to skull vertex without the administration of intravenous contrast.     The radiation dose reduction device was turned on for each scan per the  ALARA (As Low as Reasonably Achievable) protocol.     COMPARISON: None.     FINDINGS: The parenchyma is grossly unremarkable. Some low-density area  is seen in the periventricular and subcortical white matter suggesting  chronic small vessel ischemic change. There is no hemorrhage or  hydrocephalus. No mass, mass effect or midline shift. Bony structures  reveal no evidence of osseous abnormality. There is mucosal thickening  of the left maxillary sinus and ethmoid air cells. Globes and orbits are  intact. The mastoid air cells are patent.       Impression:       No acute intracranial abnormality with chronic changes seen  within the brain.     DICTATED:   10/14/2018  EDITED/ls :   10/14/2018      This report was finalized on 10/14/2018 4:50 PM by Dr. Rosalie Kline MD.               Impression:   1.  ESBL Klebsiella UTI   2.  Anemia–severe.   3.  Acute/chronic renal failure/ATN   4.  History of Cdiff–she will remain at risk for relapse of C. difficile colitis due to the need to treat her UTI.  5.  Diabetes Mellitus, type 2–this places her at increased risk for recurrent  infections  6.  Encephalopathy–toxic metabolic–this may be secondary to her UTI    PLAN/RECOMMENDATIONS:   1. Monitor off antibiotics   2. Discharge soon      Dr. Olivo has obtained the history, performed the physical exam and formulated the above treatment plan.     SUELLEN Chambers  10/22/2018  7:33 AM

## 2018-10-22 NOTE — PROGRESS NOTES
[facility] Medicine Services  PROGRESS NOTE    Patient Name: Joy Bueno  : 1950  MRN: [id number]    Date of Admission: 10/14/2018  Length of Stay: 8  Primary Care Physician: Provider, No Known    Subjective   Subjective     CC: Hospital follow up for sepsis    HPI:    Patient resting in bed in dialysis this morning.  Dr. Calderón is planning for AV fistula placement this afternoon.        Review of Systems  Gen- No fevers, chills  CV- No chest pain, palpitations  Resp- No cough, dyspnea  GI- No N/V/D, abd pain    Otherwise ROS is negative except as mentioned in the HPI.    Objective   Objective     Vital Signs:   Temp:  [96.8 °F (36 °C)-97.9 °F (36.6 °C)] 97.9 °F (36.6 °C)  Heart Rate:  [61-78] 65  Resp:  [18-20] 20  BP: (120-165)/(57-77) 134/57       Physical Exam:  Constitutional: chronically ill female, in no acute distress, asleep but arouses to voice, resting in bed.   HENT: NCAT, mucous membranes moist  Respiratory: non labored respirations, no wheezes or rhonchi on room air  Cardiovascular: RRR, no murmurs, rubs, or gallops, palpable pedal pulses bilaterally  Gastrointestinal: Obese, positive bowel sounds, soft, nontender, nondistended  Musculoskeletal: No bilateral ankle edema  Psychiatric: Appropriate affect, cooperative  Neurologic: Oriented x 3, no focal deficits  Skin: No rashes, posterior neck/upper back lesion- no erythema or drainage noted.       Results Reviewed:  I have personally reviewed current lab, radiology, and data and agree.      Results from last 7 days  Lab Units 10/20/18  0236 10/19/18  0659 10/17/18  0543  10/16/18  0802   WBC 10*3/mm3 13.95* 12.97*  --   --  12.46*   HEMOGLOBIN g/dL 8.5* 8.6* 9.0*  < > 6.9*   HEMATOCRIT % 27.0* 27.3* 29.0*  < > 22.8*   PLATELETS 10*3/mm3 197 205  --   --  210   < > = values in this interval not displayed.    Results from last 7 days  Lab Units 10/20/18  0236 10/19/18  0659 10/18/18  0638   SODIUM mmol/L 141 142 142    POTASSIUM mmol/L 3.6 4.0 5.0   CHLORIDE mmol/L 110* 111* 113*   CO2 mmol/L 25.0 22.0 20.0   BUN mg/dL 39* 56* 78*   CREATININE mg/dL 2.47* 3.34* 4.50*   GLUCOSE mg/dL 141* 103* 110*   CALCIUM mg/dL 8.4* 8.6* 8.7   ALT (SGPT) U/L  --   --  13   AST (SGOT) U/L  --   --  12   TROPONIN I ng/mL 0.019  --   --      Estimated Creatinine Clearance: 30.9 mL/min (A) (by C-G formula based on SCr of 2.47 mg/dL (H)).  No results found for: BNP    Microbiology Results Abnormal     Procedure Component Value - Date/Time    Wound Culture - Wound, Back, Upper [[id number]] Collected:  10/21/18 1201    Lab Status:  Preliminary result Specimen:  Wound from Back, Upper Updated:  10/21/18 2211     Gram Stain Result Moderate (3+) WBCs seen      No organisms seen    Blood Culture - Blood, [[id number]]  (Normal) Collected:  10/14/18 1606    Lab Status:  Final result Specimen:  Blood from Arm, Right Updated:  10/19/18 1630     Blood Culture No growth at 5 days    Blood Culture - Blood, [[id number]]  (Normal) Collected:  10/14/18 1606    Lab Status:  Final result Specimen:  Blood from Arm, Left Updated:  10/19/18 1630     Blood Culture No growth at 5 days    Urine Culture - Urine, [[id number]]  (Abnormal)  (Susceptibility) Collected:  10/14/18 1340    Lab Status:  Final result Specimen:  Urine from Urine, Catheter Updated:  10/16/18 1403     Urine Culture >100,000 CFU/mL Klebsiella pneumoniae ESBL (C)     Comment:   Consider infectious disease consult.  Susceptibility results may not correlate to clinical outcomes.       Susceptibility      Klebsiella pneumoniae ESBL     AL     Ciprofloxacin >2 ug/ml Resistant     Ertapenem <=1 ug/ml Susceptible     Gentamicin <=4 ug/ml Susceptible     Levofloxacin >4 ug/ml Resistant     Meropenem <=1 ug/ml Susceptible     Nitrofurantoin 64 ug/ml Intermediate     Piperacillin + Tazobactam <=16 ug/ml Susceptible     Tetracycline >8 ug/ml Resistant     Tobramycin >8 ug/ml Resistant     Trimethoprim +  Sulfamethoxazole >2/38 ug/ml Resistant                          Imaging Results (last 24 hours)     ** No results found for the last 24 hours. **        Results for orders placed during the hospital encounter of 09/02/17   Adult Transthoracic Echo Complete    Narrative · Left ventricular wall thickness is consistent with mild concentric   hypertrophy.  · Left atrial cavity size is mildly dilated.  · Mild mitral valve regurgitation is present  · calcification of the aortic valve  · Mild tricuspid valve regurgitation is present.          I have reviewed the medications.      amLODIPine 10 mg Oral Daily   [MAR Hold] atorvastatin 10 mg Oral Nightly   [MAR Hold] budesonide-formoterol 2 puff Inhalation BID - RT   carvedilol 25 mg Oral BID With Meals   [MAR Hold] epoetin porter 20,000 Units Subcutaneous Once per day on Mon Wed Fri   [MAR Hold] heparin (porcine) 5,000 Units Subcutaneous Q12H   hydrALAZINE 100 mg Oral TID   [MAR Hold] insulin lispro 0-7 Units Subcutaneous 4x Daily With Meals & Nightly   [MAR Hold] isosorbide mononitrate 30 mg Oral Daily   [MAR Hold] pantoprazole 40 mg Oral Q AM   [MAR Hold] polyethylene glycol 17 g Oral Daily   [MAR Hold] saccharomyces boulardii 250 mg Oral BID   [MAR Hold] sertraline 50 mg Oral Daily   [MAR Hold] sodium bicarbonate 1,300 mg Oral BID   [MAR Hold] sodium chloride 3 mL Intravenous Q12H         Assessment/Plan   Assessment / Plan     Active Hospital Problems    Diagnosis   • **Severe sepsis (CMS/[facility])   • Hypertension   • ESRD (end stage renal disease) (CMS/[facility])     Renal Biopsy 08/10/17: Diabetic neuropathy, mild to moderate atherosclerosis        • Hyperkalemia   • Metabolic encephalopathy   • Anemia of chronic renal failure, stage 5 (CMS/[facility])   • Diabetes mellitus, type II (CMS/[facility])   • Chronic heart failure (CMS/[facility])   • UTI (urinary tract infection)   • Leukocytosis      Brief Hospital Course to date:  Joy Bueno is a 68 y.o. female past medical history of type 2  diabetes, significant stage IV, chronic heart failure, hypertension.  Presents to [facility], which place with complaints for 5 days of lethargy, decreased by mouth intake, difficult to arouse.  Here patient has been admitted with sepsis and acute metabolic encephalopathy likely secondary to UTI.     Assessment & Plan:  - Severe sepsis (POA)  likely secondary to ESBL Klebsiella UTI improving, blood cultures NGTD.  Monitor off ABX per ID (has completed Invanz)   - Acute encephalopathy- appears resolved, likely multifactorial sec to uremia vs sepsis  - ESRD likely sec to hypertensive nephropathy she is s/p tunneled cath placement 10/17, renal following started HD.  AV fistula this afternoon.   - Suspected decompensated HF with elevated BNP and hypoxia, continue O2. HD alleviating volume overload.    - Previously well controlled T2DM AC1 6.8% (8/3), repeat is 5.4% suspect these are falsely low sec to multiple blood transfusions, continue ssi  - Anemia stable, no si/sx of acute blood loss, suspect ACD, continue to monitor, s/p 3 unit PRBC transfuse, continue to monitor  - Posterior neck/upper back lesion- no longer draining.  Patient afebrile with no signs of infection.  WOC consult.  Will monitor.  Patient has had these lesions in her axillary area in the past.       DVT Prophylaxis:  [facility]    CODE STATUS:   Code Status and Medical Interventions:   Ordered at: 10/14/18 1942     Level Of Support Discussed With:    Patient     Code Status:    CPR     Medical Interventions (Level of Support Prior to Arrest):    Full       Disposition: Transferring to SNF once placement arranged.     Electronically signed by SUELLEN Richey, 10/22/18, 1:55 PM.

## 2018-10-22 NOTE — PROGRESS NOTES
Continued Stay Note  [facility]     Patient Name: Joy Bueno  MRN: [id number]  Today's Date: 10/22/2018    Admit Date: 10/14/2018          Discharge Plan     Row Name 10/22/18 1433       Plan    Plan Comments TREVOR received chair time for pt to start outpatient dialysis with [facility] on [facility]. Pt to start outpatient dialysis Wednesday 10/24 at 7:00am chair time. TREVOR faxed updated clinicals to [facility] and spoke with admissions staff who report they will check with DON to see if still able to take pt and accomodate her needs. SW awaiting call back from [facility]. TREVOR followed up with Saloni at [facility] who reports they are following pt, however do not have nay current beds for pt. TREVOR spoke with pt's daughter over the phone and she reports she is agreeable to pt discharging back to [facility] place if [facility] does not have a bed right now. TREVOR set up wheels transportation for pt to go to dialysis Wednesday morning from [facility]. Wheels will pick pt up from [facility] Wednesday Morning between 6 and 6:30am and take pt to [facility] location [facility].               Discharge Codes    No documentation.           ROGELIO Maloney

## 2018-10-22 NOTE — OP NOTE
General Surgery Operative Note    Joy Bueno  [id number]  1950    Date of Surgery:  10/22/2018 5:35 PM    Pre-op Diagnosis: Chronic  Renal failure    Post-op Diagnosis: Chronic Renal failure    Procedure: Arteriovenous fistula creation, brachiobrachial forearm loop graft, left                       Artegraft 5 mm conduit    Procedure(s):  LEFT UPPER EXTREMITY ARTERIOVENOUS FISTULA CREATION    Surgeon: Carlos Enrique Calderón MD    Assistant: None    Anesthesia: Monitor Anesthesia Care    Fluids: 100 mL of crystalloid    Estimated Blood Loss: Less than 100 mL    Urine Voided: Not recorded     Drains: None    Implant: 5 mm Artegraft    Findings: The brachial artery was adequate of size with minimal calcifications.                   The brachial outflow vein was small.                   There was a noted palpable thrill in the outflow vein.    Complications: None apparent.    History:   68-year-old lady with chronic renal failure and requires long-term hemodialysis access.  She is currently on dialysis via a tunneled dialysis catheter.      I had a discussion regarding the risks and benefits of arteriovenous fistula creation, including but not limited to: bleeding, infection, injury to adjacent viscera (vessels and nerves), permanent neurovascular or neuromuscular deficits, a steal syndrome, non-maturation, chronic pain, need for re-intervention, distal arterial embolization, and medical issues from a cardiopulmonary and deep venous thrombosis standpoint.  All their questions were answered and they wish to proceed.     Procedure:      After informed consent the patient was taken to the operating room and placed in the supine position on the operating table.  Adequate cardiopulmonary monitoring was placed by anesthesia, the left upper extremity was prepped and draped in the standard sterile fashion, and Ioban was placed on the skin, and a timeout was observed.     A transverse incision just distal to the left  antecubital crease was created transversely.  I dissected down to the brachial artery sheath which was opened longitudinally.  The brachial artery had 2 paired brachial veins.  The brachial veins were dissected free from the artery itself, the medial vein was of larger size than its lateral counterpart.  The artery was minimally calcified. The artery was dissected circumferentially from the surrounding tissue and encircled with a vessel loop.  I felt the vessels were adequate for creation of a forearm loop graft, brachiobrachial with 5 mm Artegraft.  The subcutaneous tunnel was made with a Hector tunneler.  A counterincision in the distal forearm was used.  The Artegraft was marked to maintain orientation.  There was adequate laxity for the arterial and venous anastomosis.  I began with the venous anastomosis.  The vein was clamped proximally and distally, the venotomy was made with an 11 blade scalpel which was extended appropriately with Haywood scissors to match the 5 mm Artegraft opening.  In an end to side fashion using a running 6-0 Prolene the venous anastomosis was performed.  I copiously irrigated the area with heparinized saline, appropriately de-aired the system, released all clamps and tied the anastomosis down under venous pressure.  The conduit was instilled with heparinized saline and the distal end was then clamped.  I then went to the arterial anastomosis.  The brachial artery was clamped proximally and distally, the arteriotomy was created with an 11 blade scalpel, and this was extended appropriately with Haywood scissors.  Again, in an end-to-side fashion the arterial anastomosis was performed with a running 6-0 Prolene.  Appropriate de-airing maneuvers were performed.  All clamps were then removed and the arterial anastomosis was tied down under full systemic arterial pressure.  There was a reasonable thrill in the graft.  She had a palpable radial artery.  Meticulous hemostasis was obtained.  The  incisions were then closed with interrupted 0 Vicryl and the skin run with a 4-0 subcuticular Monocryl.  Skin glue was placed on the incisions.  This was then covered with Telfa Tegaderm.     Sterile dressings were placed on the wound.  All lap and needle counts were correct at the end of the procedure ×2.  The patient was then transferred to the recovery room in stable condition.    Carlos Enrique Calderón MD     Date: 10/22/2018  Time: 5:35 PM

## 2018-10-22 NOTE — PLAN OF CARE
"Problem: Patient Care Overview  Goal: Plan of Care Review  Outcome: Ongoing (interventions implemented as appropriate)   10/22/18 1220   Coping/Psychosocial   Plan of Care Reviewed With patient   Plan of Care Review   Progress no change   OTHER   Outcome Summary Patient consult for wound on back of neck-patient states it has been there for a \"few months\"-drains occasionally-appears to be cyst-no drainage noted at present-area cleaned with sterile normal saline-covered with xeroform gauze and foam dressing. Patient on bariatric dolphin bed. Is at risk for skin breakdown-all interventions in place and implemented. WOC will continue to observe.        Problem: Skin Injury Risk (Adult)  Goal: Identify Related Risk Factors and Signs and Symptoms  Outcome: Ongoing (interventions implemented as appropriate)   10/22/18 1220   Skin Injury Risk (Adult)   Related Risk Factors (Skin Injury Risk) body weight extremes;cognitive impairment;mobility impaired;moisture     Goal: Skin Health and Integrity   10/22/18 1220   Skin Injury Risk (Adult)   Skin Health and Integrity making progress toward outcome         "

## 2018-10-22 NOTE — PLAN OF CARE
Problem: Patient Care Overview  Goal: Plan of Care Review  Outcome: Ongoing (interventions implemented as appropriate)   10/22/18 0324   Coping/Psychosocial   Plan of Care Reviewed With patient   Plan of Care Review   Progress improving   OTHER   Outcome Summary Pt A&O *4 throughout night, Dialysis this AM, AV fistula placement today     Goal: Individualization and Mutuality  Outcome: Ongoing (interventions implemented as appropriate)   10/22/18 0324   Individualization   Patient Specific Interventions Ensure call lights within reach       Problem: Fall Risk (Adult)  Goal: Identify Related Risk Factors and Signs and Symptoms  Outcome: Ongoing (interventions implemented as appropriate)   10/22/18 0324   Fall Risk (Adult)   Related Risk Factors (Fall Risk) gait/mobility problems;bladder function altered   Signs and Symptoms (Fall Risk) presence of risk factors     Goal: Absence of Fall  Outcome: Ongoing (interventions implemented as appropriate)   10/22/18 0324   Fall Risk (Adult)   Absence of Fall making progress toward outcome       Problem: Skin Injury Risk (Adult)  Goal: Identify Related Risk Factors and Signs and Symptoms  Outcome: Ongoing (interventions implemented as appropriate)   10/22/18 0324   Skin Injury Risk (Adult)   Related Risk Factors (Skin Injury Risk) body weight extremes     Goal: Skin Health and Integrity  Outcome: Ongoing (interventions implemented as appropriate)   10/22/18 0324   Skin Injury Risk (Adult)   Skin Health and Integrity making progress toward outcome       Problem: Anemia (Adult)  Goal: Identify Related Risk Factors and Signs and Symptoms  Outcome: Ongoing (interventions implemented as appropriate)   10/22/18 0324   Anemia (Adult)   Related Risk Factors (Anemia) chronic illness   Signs and Symptoms (Anemia) fatigue     Goal: Symptom Improvement  Outcome: Ongoing (interventions implemented as appropriate)   10/22/18 0324   Anemia (Adult)   Symptom Improvement making progress toward  outcome       Problem: Hemodialysis (Adult)  Goal: Signs and Symptoms of Listed Potential Problems Will be Absent, Minimized or Managed (Hemodialysis)  Outcome: Ongoing (interventions implemented as appropriate)   10/22/18 0324   Goal/Outcome Evaluation   Problems Assessed (Hemodialysis) all   Problems Present (Hemodialysis) situational response;infection

## 2018-10-22 NOTE — ANESTHESIA PROCEDURE NOTES
Peripheral Block      Patient location during procedure: pre-op  Start time: 10/22/2018 1:40 PM  Stop time: 10/22/2018 1:50 PM  Reason for block: at surgeon's request and post-op pain management  Performed by  Anesthesiologist: PARTH LINK  Preanesthetic Checklist  Completed: patient identified, site marked, surgical consent, pre-op evaluation, timeout performed, IV checked and monitors and equipment checked  Prep:  Pt Position: supine  Sterile barriers:cap, gloves, mask and sterile barriers  Prep: ChloraPrep  Patient monitoring: blood pressure monitoring, continuous pulse oximetry and EKG  Procedure  Sedation:yes    Guidance:ultrasound guided  Images:still images obtained    Laterality:left  Block Type:supraclavicular  Injection Technique:single-shot  Needle Type:echogenic and short-bevel  Needle Gauge:20 G    Medications  Local Injected:ropivacaine 0.5% Local Amount Injected:30mL  Post Assessment  Injection Assessment: negative aspiration for heme, no paresthesia on injection and incremental injection  Patient Tolerance:comfortable throughout block  Complications:no  Additional Notes  Procedure:                 The pt was placed in semifowlers position with a slight tilt of the thorax contralateral to the insertion site.  The Insertion Site was prepped and draped in sterile fashion.  The pt was anesthetized with  IV Sedation( see meds) and  skin and cutaneous tissue where infiltrated and anesthetized with 1% Lidocaine 3 mls via a 25g needle.  Utilizing ultrasound guidance, a BBraun  Contiplex needle was advanced in-plane in a lateral to medial direction.  Hydro dissection of tissue was achieved with Normal saline. Major vessels(supraclavicular artery) and the pleura where visualized as the brachial plexus was approached at the level immediately adjacent and superior to the clavicle. LA spread was visualized and injection was made incrementally every 5 mls with aspiration. Injection pressure was normal or  little, there was no intraneural injection, no vascular injection. Thank You.

## 2018-10-22 NOTE — PROGRESS NOTES
I have reviewed the chart, my prior clinic note, appropriate imaging, and labs.  I have again discussed the risks and benefits of AV fistula creation with artegraft, left forearm loop with the patient.  All of her questions have been answered.  She understands and wishes to proceed with fistula creation.

## 2018-10-22 NOTE — PROGRESS NOTES
INFECTIOUS DISEASE PROGRESS NOTE    Joy Bueno  1950  [id number]      Admission Date: 10/14/2018      Requesting Provider: No ref. provider found  Evaluating Physician: Velasquez Olivo MD    Reason for Consultation:  ESBL Ecoli  UTI      History of present illness:    10/15/18: Patient is a 68 y.o. female seen today for a UTI.  She was brought from her nursing home with increasing lethargy, and  confusion over the past several days.  She was hospitalized here in August and treated with Invanz for an ESBL Ecoli UTI.  On admission, her urinalysis had too  Numerous to count white cells, and urine culture now with Gram negative rods . She is hypothermic,  With a leukocytosis of 14, 000,  hgb of 7.0, Scr 4.29. Ertapenem was initiated and we were consulted for evaluation and treatment.    10/16/18: She denies nausea and vomiting.  She has decreased dyspnea.  She is scheduled for hemodialysis access by Dr. Calderón.  I discussed her situation with Dr. Calderón today.  10/17/18:  She is now on nasal cannula.  She will  Have her dialysis access later today.  Remains afebrile.   10/18/18: She denies dysuria.  The nursing staff indicates she had approximately 4-5 100 cc of urine out over the day.  She underwent dialysis access placement yesterday.  She has remained afebrile.  10/19/18:  She is currently on HD.  She is more alert this am.  Complaining of hemorrhoids.  She remains afebrile.   10/22/18: She remains afebrile.  She has a long-standing cystic lesion on her cervical back region.  This has intermittently drained.    Past Medical History:   Diagnosis Date   • Anemia    • Anxiety    • Asthma    • Chronic kidney disease    • Diabetes mellitus (CMS/[facility])    • Diabetes mellitus, type II (CMS/[facility]) 8/3/2018   • History of Clostridium difficile infection 8/3/2018   • History of respiratory failure, since off home oxygen 8/3/2018   • History of UTI 8/3/2018   • Hypertension    • Morbid obesity (CMS/[facility])    •  Osteoarthritis    • Tinea capitis 8/3/2018       Past Surgical History:   Procedure Laterality Date   •  SECTION     • COLONOSCOPY N/A 2017    Procedure: COLONOSCOPY;  Surgeon: Mark I Brunner, MD;  Location:  [facility];  Service:    • ENDOSCOPY N/A 2017    Procedure: ESOPHAGOGASTRODUODENOSCOPY ;  Surgeon: Velasquez Call MD;  Location:  [facility];  Service:    • HERNIA REPAIR     • INSERTION HEMODIALYSIS CATHETER Right 10/17/2018    Procedure: HEMODIALYSIS CATHETER INSERTION;  Surgeon: Carlos Enrique Calderón MD;  Location:  [facility];  Service: General       Family History   Problem Relation Age of Onset   • Cardiomyopathy Mother    • Stroke Father    • Diabetes Father        Social History     Social History   • Marital status:      Spouse name: N/A   • Number of children: N/A   • Years of education: N/A     Occupational History   • Not on file.     Social History Main Topics   • Smoking status: Former Smoker     Packs/day: 0.25   • Smokeless tobacco: Not on file      Comment: unknown when quit, maybe last year   • Alcohol use No   • Drug use: No   • Sexual activity: No     Other Topics Concern   • Not on file     Social History Narrative    Mrs. Bueno is a 68 year old AA  female. She lives alone in [place] but has been in rehab for some time. She has a daughter. She does not have an advanced directive.       Allergies   Allergen Reactions   • Geodon [Ziprasidone Hcl] Unknown (See Comments)     PT DOESN'T REMEMBER   • Haldol [Haloperidol Lactate] Unknown (See Comments)     PT DOESN'T REMEMBER   • Seroquel [Quetiapine Fumarate] Unknown (See Comments)     PT DOESN'T REMEMBER         Medication:    Current Facility-Administered Medications:   •  acetaminophen (TYLENOL) tablet 650 mg, 650 mg, Oral, Q4H PRN, Doris Heard, APRN, 650 mg at 10/21/18 2208  •  albumin human 25 % IV SOLN 12.5 g, 12.5 g, Intravenous, PRN, Irineo Latham MD  •  amLODIPine (NORVASC)  tablet 10 mg, 10 mg, Oral, Daily, Doris Heard APRN, 10 mg at 10/22/18 1003  •  atorvastatin (LIPITOR) tablet 10 mg, 10 mg, Oral, Nightly, Doris Heard APRN, 10 mg at 10/21/18 2006  •  budesonide-formoterol (SYMBICORT) 160-4.5 MCG/ACT inhaler 2 puff, 2 puff, Inhalation, BID - RT, Doris Heard APRN, 2 puff at 10/21/18 1908  •  carvedilol (COREG) tablet 25 mg, 25 mg, Oral, BID With Meals, Doris Heard APRN, 25 mg at 10/22/18 1002  •  dextrose (D50W) 25 g/ 50mL Intravenous Solution 25 g, 25 g, Intravenous, Q15 Min PRN, Doris Heard APRN  •  dextrose (GLUTOSE) oral gel 15 g, 15 g, Oral, Q15 Min PRN, Doris Heard APRN  •  epoetin porter (EPOGEN,PROCRIT) injection 20,000 Units, 20,000 Units, Subcutaneous, Once per day on Mon Wed Fri, Rocky Nova MD, 20,000 Units at 10/22/18 1003  •  glucagon (human recombinant) (GLUCAGEN DIAGNOSTIC) injection 1 mg, 1 mg, Subcutaneous, PRN, Doris Heard APRN  •  heparin (porcine) 5000 UNIT/ML injection 5,000 Units, 5,000 Units, Subcutaneous, Q12H, Doris Heard APRN, 5,000 Units at 10/22/18 1003  •  heparin (porcine) injection 1,900 Units, 1,900 Units, Intracatheter, PRN, Irineo Latham MD, 1,900 Units at 10/22/18 1001  •  hydrALAZINE (APRESOLINE) tablet 100 mg, 100 mg, Oral, TID, Doris Heard APRN, 100 mg at 10/22/18 1002  •  hydrocortisone (ANUSOL-HC) 2.5 % rectal cream, , Rectal, BID PRN, Tash Pate APRN  •  Influenza Vac Subunit Quad (FLUCELVAX) injection 0.5 mL, 0.5 mL, Intramuscular, During Hospitalization, Paula Silver MD  •  insulin lispro (humaLOG) injection 0-7 Units, 0-7 Units, Subcutaneous, 4x Daily With Meals & Nightly, Doris Heard APRN, 2 Units at 10/21/18 2131  •  isosorbide mononitrate (IMDUR) 24 hr tablet 30 mg, 30 mg, Oral, Daily, Doris Heard APRN, 30 mg at 10/22/18 1003  •  lactated ringers infusion, 9 mL/hr, Intravenous, Continuous, Jesus Benavides MD, Stopped at 10/17/18 2001  •   ondansetron (ZOFRAN) tablet 4 mg, 4 mg, Oral, Q6H PRN **OR** ondansetron (ZOFRAN) injection 4 mg, 4 mg, Intravenous, Q6H PRN, Doris Heard APRN  •  pantoprazole (PROTONIX) EC tablet 40 mg, 40 mg, Oral, Q AM, Doris Heard APRN, 40 mg at 10/22/18 1003  •  phenylephrine-mineral oil-petrolatum 0.25-14-74.9 % hemorhoidal ointment, , Rectal, TID PRN, Sara Krause MD  •  polyethylene glycol 3350 powder (packet), 17 g, Oral, Daily, Doris Heard APRN, 17 g at 10/22/18 1002  •  saccharomyces boulardii (FLORASTOR) capsule 250 mg, 250 mg, Oral, BID, Doris Heard APRN, 250 mg at 10/22/18 1003  •  sertraline (ZOLOFT) tablet 50 mg, 50 mg, Oral, Daily, Doris Heard APRN, 50 mg at 10/22/18 1003  •  sodium bicarbonate tablet 1,300 mg, 1,300 mg, Oral, BID, Rocky Nova MD, 1,300 mg at 10/22/18 1002  •  sodium chloride 0.9 % flush 3 mL, 3 mL, Intravenous, Q12H, Doris Heard APRN, 3 mL at 10/18/18 2122  •  sodium chloride 0.9 % flush 3-10 mL, 3-10 mL, Intravenous, PRN, Doris Heard APRGISEL    Antibiotics:  Anti-Infectives     Ordered     Dose/Rate Route Frequency Start Stop    10/14/18 1512  ertapenem (INVanz) 1 g/100 mL 0.9% NS VTB (mbp)     Ordering Provider:  Raheel Brunson PA    1 g  over 30 Minutes Intravenous Once 10/14/18 1514 10/14/18 1701            Review of Systems:  No reliable ROS from patient     Physical Exam:   Vital Signs  Temp (24hrs), Av.5 °F (36.4 °C), Min:96.8 °F (36 °C), Max:97.9 °F (36.6 °C)    Temp  Min: 96.8 °F (36 °C)  Max: 97.9 °F (36.6 °C)  BP  Min: 120/61  Max: 165/72  Pulse  Min: 61  Max: 78  Resp  Min: 18  Max: 20  SpO2  Min: 95 %  Max: 98 %    GENERAL alert .  She is in no acute distress..   HEENT: Normocephalic, atraumatic.  PERRL. EOMI. No conjunctival injection. No icterus. Oropharynx clear without evidence of thrush or exudate.   NECK: Supple without nuchal rigidity. No mass.  LYMPH: No cervical, axillary or inguinal  lymphadenopathy.  HEART: RRR; No murmur, rubs, gallops.   LUNGS:  Few rhonchi  Anteriorly   ABDOMEN: Soft, nontender,firm,  bowel sounds quiet  No rebound or guarding. NO mass or HSM.  EXT:  No cyanosis, clubbing or edema. No cord.  : Genitalia generally unremarkable. purewick in place   MSK: FROM without joint effusions noted arms/legs.    SKIN: Warm and dry  Scalp eczema,   Neuro:  Alert and oriented ×3  Right Rian site ok.  Back: She has a 4 centimeter diameter fluctuant lesion over her mid cervical back region without associated erythema.  There is no active drainage.    Laboratory Data      Results from last 7 days  Lab Units 10/20/18  0236 10/19/18  0659 10/17/18  0543  10/16/18  0802   WBC 10*3/mm3 13.95* 12.97*  --   --  12.46*   HEMOGLOBIN g/dL 8.5* 8.6* 9.0*  < > 6.9*   HEMATOCRIT % 27.0* 27.3* 29.0*  < > 22.8*   PLATELETS 10*3/mm3 197 205  --   --  210   < > = values in this interval not displayed.    Results from last 7 days  Lab Units 10/20/18  0236   SODIUM mmol/L 141   POTASSIUM mmol/L 3.6   CHLORIDE mmol/L 110*   CO2 mmol/L 25.0   BUN mg/dL 39*   CREATININE mg/dL 2.47*   GLUCOSE mg/dL 141*   CALCIUM mg/dL 8.4*       Results from last 7 days  Lab Units 10/18/18  0638   ALK PHOS U/L 81   BILIRUBIN mg/dL 0.3   BILIRUBIN DIRECT mg/dL 0.1   ALT (SGPT) U/L 13   AST (SGOT) U/L 12           UA  TNTC WBCS              Estimated Creatinine Clearance: 30.9 mL/min (A) (by C-G formula based on SCr of 2.47 mg/dL (H)).      Microbiology:  Blood Culture   Date Value Ref Range Status   10/14/2018 No growth at less than 24 hours  Preliminary   10/14/2018 No growth at less than 24 hours  Preliminary                    Urine Culture   Date Value Ref Range Status   10/14/2018 >100,000 CFU/mL Gram Negative Bacilli (A)  Preliminary      Urine culture is growing ESBL Klebsiella        Radiology:  Imaging Results (last 72 hours)     Procedure Component Value Units Date/Time    XR Chest 2 View [[id number]] Collected:   10/14/18 1443     Updated:  10/14/18 1653    Narrative:          EXAMINATION: XR CHEST 2 VW - 10/14/2018     INDICATION: Lethargy.      COMPARISON: None.     FINDINGS: Two-view chest reveals the heart to be enlarged. Underlying  chronic changes seen throughout the lung fields bilaterally. No evidence  of acute parenchymal disease. Pulmonary vascularity is pronounced. No  focal parenchymal opacification present. Degenerative change is seen  within the spine.           Impression:       Low lung volumes with chronic changes seen within the lung  fields, enlargement heart and no evidence of acute parenchymal disease.     DICTATED:   10/14/2018  EDITED/ls :   10/14/2018      This report was finalized on 10/14/2018 4:51 PM by Dr. Rosalie Kline MD.       CT Head Without Contrast [[id number]] Collected:  10/14/18 1441     Updated:  10/14/18 1652    Narrative:       EXAMINATION: CT HEAD WO CONTRAST - 10/12/2018     INDICATION: Lethargy, weakness.     TECHNIQUE: Multiple axial CT imaging is obtained of the head from skull  base to skull vertex without the administration of intravenous contrast.     The radiation dose reduction device was turned on for each scan per the  ALARA (As Low as Reasonably Achievable) protocol.     COMPARISON: None.     FINDINGS: The parenchyma is grossly unremarkable. Some low-density area  is seen in the periventricular and subcortical white matter suggesting  chronic small vessel ischemic change. There is no hemorrhage or  hydrocephalus. No mass, mass effect or midline shift. Bony structures  reveal no evidence of osseous abnormality. There is mucosal thickening  of the left maxillary sinus and ethmoid air cells. Globes and orbits are  intact. The mastoid air cells are patent.       Impression:       No acute intracranial abnormality with chronic changes seen  within the brain.     DICTATED:   10/14/2018  EDITED/ls :   10/14/2018      This report was finalized on 10/14/2018 4:50 PM by   Rosalie Kline MD.               Impression:   1.  ESBL Klebsiella UTI- status post Invanz therapy   2.  Anemia-severe.   3.  Acute/chronic renal failure/ATN   4.  History of Cdiff-she will remain at risk for relapse of C. difficile colitis due to the need to treat her UTI.  5.  Diabetes Mellitus, type 2-this places her at increased risk for recurrent infections  6.  Encephalopathy-toxic metabolic-this may be secondary to her UTI  7.  Cervical back cyst-this appears to be a sebaceous cyst.  She has no activity limitation.  If she develops inflammation, she will require an I&D of this area.  I discussed this with the nursing staff.    PLAN/RECOMMENDATIONS:   1. Monitor off antibiotics   2. Discharge soon      Dr. Olivo has obtained the history, performed the physical exam and formulated the above treatment plan.     Velasquez Olivo MD  10/22/2018  12:22 PM

## 2018-10-22 NOTE — ANESTHESIA PREPROCEDURE EVALUATION
Anesthesia Evaluation     Patient summary reviewed and Nursing notes reviewed   NPO Solid Status: > 8 hours  NPO Liquid Status: > 2 hours           Airway   Mallampati: III  TM distance: >3 FB  Neck ROM: full  Difficult intubation highly probable  Dental    (+) poor dentition    Pulmonary    (+) pneumonia resolved , pleural effusion, asthma, decreased breath sounds,   Cardiovascular   Exercise tolerance: poor (<4 METS)    Rhythm: regular  Rate: normal    (+) hypertension well controlled, CHF,     ROS comment: EF 79%  MR    Neuro/Psych  (+) psychiatric history Anxiety,     GI/Hepatic/Renal/Endo    (+) morbid obesity,  renal disease ESRD, diabetes mellitus,   (-)  obesity    Musculoskeletal     Abdominal   (+) obese,     Abdomen: soft.   Substance History      OB/GYN          Other   (+) blood dyscrasia, arthritis     ROS/Med Hx Other: ANEMIA                  Anesthesia Plan    ASA 3     regional and MAC     intravenous induction   Anesthetic plan, all risks, benefits, and alternatives have been provided, discussed and informed consent has been obtained with: patient.    Plan discussed with CRNA.

## 2018-10-22 NOTE — PROGRESS NOTES
"   LOS: 8 days    Patient Care Team:  Provider, No Known as PCP - General    Reason For Visit:  Diabetic nephropathy biopsy proven, stage V CK D, now requiring dialysis.  Tunnel catheter place 10/17/2018.  Dialysis started 10/18/2018, patient tolerating well  Subjective     Seen on dialysis  No new events. No distress        Review of Systems:   Denies nausea vomiting shortness of breath or chest pain          Objective       amLODIPine 10 mg Oral Daily   atorvastatin 10 mg Oral Nightly   budesonide-formoterol 2 puff Inhalation BID - RT   carvedilol 25 mg Oral BID With Meals   epoetin porter 20,000 Units Subcutaneous Once per day on Mon Wed Fri   heparin (porcine) 5,000 Units Subcutaneous Q12H   hydrALAZINE 100 mg Oral TID   insulin lispro 0-7 Units Subcutaneous 4x Daily With Meals & Nightly   isosorbide mononitrate 30 mg Oral Daily   pantoprazole 40 mg Oral Q AM   polyethylene glycol 17 g Oral Daily   saccharomyces boulardii 250 mg Oral BID   sertraline 50 mg Oral Daily   sodium bicarbonate 1,300 mg Oral BID   sodium chloride 3 mL Intravenous Q12H       lactated ringers 9 mL/hr Last Rate: Stopped (10/17/18 2001)         Vital Signs:  Blood pressure 145/73, pulse 78, temperature 96.8 °F (36 °C), temperature source Tympanic, resp. rate 18, height 165.1 cm (65\"), weight 136 kg (300 lb), SpO2 98 %.    Flowsheet Rows      First Filed Value   Admission Height  165.1 cm (65\") Documented at 10/14/2018 1215   Admission Weight  136 kg (300 lb) Documented at 10/14/2018 1215          10/21 0701 - 10/22 0700  In: 584 [P.O.:584]  Out: 650 [Urine:650]    Physical Exam:  General Appearance:Alert oriented no obvious distress  Lungs: Clear auscultation, no rales rhonchi's, equal chest movement, nonlabored.  Heart: No gallop, murmur, rub, RRR.  Abdomen: Soft, nontender, positive bowel sounds, no organomegaly.  Extremities:Edema lower extremity improved, no cyanosis..  Neuro: alert oriented×3  Neck: Tunnel catheter right side noted.  No " sign of  infection or bleeding        Labs:    Results from last 7 days  Lab Units 10/20/18  0236 10/19/18  0659 10/17/18  0543  10/16/18  0802   WBC 10*3/mm3 13.95* 12.97*  --   --  12.46*   HEMOGLOBIN g/dL 8.5* 8.6* 9.0*  < > 6.9*   HEMATOCRIT % 27.0* 27.3* 29.0*  < > 22.8*   PLATELETS 10*3/mm3 197 205  --   --  210   < > = values in this interval not displayed.    Results from last 7 days  Lab Units 10/20/18  0236 10/19/18  0659 10/18/18  0638 10/16/18  0802   SODIUM mmol/L 141 142 142 143   POTASSIUM mmol/L 3.6 4.0 5.0 5.6*   CHLORIDE mmol/L 110* 111* 113* 115*   CO2 mmol/L 25.0 22.0 20.0 20.0   BUN mg/dL 39* 56* 78* 78*   CREATININE mg/dL 2.47* 3.34* 4.50* 4.42*   CALCIUM mg/dL 8.4* 8.6* 8.7 8.2*   PHOSPHORUS mg/dL  --  4.1 5.8* 6.4*   MAGNESIUM mg/dL  --   --   --  1.5   ALBUMIN g/dL  --  3.09* 3.20 3.12*       Results from last 7 days  Lab Units 10/20/18  0236   GLUCOSE mg/dL 141*         Results from last 7 days  Lab Units 10/18/18  0638   ALK PHOS U/L 81   BILIRUBIN mg/dL 0.3   BILIRUBIN DIRECT mg/dL 0.1   ALT (SGPT) U/L 13   AST (SGOT) U/L 12                 Estimated Creatinine Clearance: 30.9 mL/min (A) (by C-G formula based on SCr of 2.47 mg/dL (H)).      Assessment      1.  Diabetic nephropathy biopsy proven: Now end-stage renal disease requiring dialysis.  2.  Hyperkalemia.  Corrected with dialysis.  3.  Anemia of chronic disease.  4.  Diabetes type 2.  5.  UTI: Severe sepsis,.  6.  Metabolic encephalopathy improving.  7.volume overload    Plan;  Next dialysis on [facility] today with ultrafiltration.   Home with outpatient dialysis is arranged at [facility] unit      Irineo Latham MD  10/22/18  8:43 AM

## 2018-10-23 VITALS
HEIGHT: 65 IN | HEART RATE: 70 BPM | RESPIRATION RATE: 20 BRPM | OXYGEN SATURATION: 94 % | DIASTOLIC BLOOD PRESSURE: 54 MMHG | TEMPERATURE: 97.7 F | BODY MASS INDEX: 48.82 KG/M2 | SYSTOLIC BLOOD PRESSURE: 130 MMHG | WEIGHT: 293 LBS

## 2018-10-23 PROBLEM — D64.9 SYMPTOMATIC ANEMIA: Status: RESOLVED | Noted: 2017-08-03 | Resolved: 2018-10-23

## 2018-10-23 PROBLEM — I50.33 ACUTE ON CHRONIC DIASTOLIC HEART FAILURE (HCC): Status: ACTIVE | Noted: 2017-11-04

## 2018-10-23 PROBLEM — Z87.09 HISTORY OF RESPIRATORY FAILURE: Status: RESOLVED | Noted: 2018-08-03 | Resolved: 2018-10-23

## 2018-10-23 PROBLEM — A41.9 SEPSIS (HCC): Status: RESOLVED | Noted: 2018-10-14 | Resolved: 2018-10-23

## 2018-10-23 PROBLEM — D72.829 LEUKOCYTOSIS: Status: RESOLVED | Noted: 2017-09-02 | Resolved: 2018-10-23

## 2018-10-23 PROBLEM — N18.4 ANEMIA IN STAGE 4 CHRONIC KIDNEY DISEASE (HCC): Status: RESOLVED | Noted: 2018-08-02 | Resolved: 2018-10-23

## 2018-10-23 PROBLEM — N18.4 CKD (CHRONIC KIDNEY DISEASE) STAGE 4, GFR 15-29 ML/MIN (HCC): Status: RESOLVED | Noted: 2018-08-02 | Resolved: 2018-10-23

## 2018-10-23 PROBLEM — I50.32 CHRONIC DIASTOLIC HEART FAILURE (HCC): Status: ACTIVE | Noted: 2018-10-23

## 2018-10-23 PROBLEM — D63.1 ANEMIA IN STAGE 4 CHRONIC KIDNEY DISEASE (HCC): Status: RESOLVED | Noted: 2018-08-02 | Resolved: 2018-10-23

## 2018-10-23 PROBLEM — N39.0 UTI (URINARY TRACT INFECTION): Status: RESOLVED | Noted: 2017-10-04 | Resolved: 2018-10-23

## 2018-10-23 PROBLEM — J18.9 PNEUMONIA: Status: RESOLVED | Noted: 2017-09-02 | Resolved: 2018-10-23

## 2018-10-23 PROBLEM — R65.20 SEVERE SEPSIS (HCC): Status: RESOLVED | Noted: 2018-10-14 | Resolved: 2018-10-23

## 2018-10-23 PROBLEM — J90 PLEURAL EFFUSION, RIGHT: Status: RESOLVED | Noted: 2017-09-02 | Resolved: 2018-10-23

## 2018-10-23 PROBLEM — K29.70 GASTRITIS: Status: RESOLVED | Noted: 2017-08-28 | Resolved: 2018-10-23

## 2018-10-23 PROBLEM — A41.9 SEVERE SEPSIS (HCC): Status: RESOLVED | Noted: 2018-10-14 | Resolved: 2018-10-23

## 2018-10-23 PROBLEM — N18.9 CKD (CHRONIC KIDNEY DISEASE): Status: RESOLVED | Noted: 2017-08-28 | Resolved: 2018-10-23

## 2018-10-23 PROBLEM — I50.33 ACUTE ON CHRONIC DIASTOLIC HEART FAILURE (HCC): Status: RESOLVED | Noted: 2017-11-04 | Resolved: 2018-10-23

## 2018-10-23 PROBLEM — E87.5 HYPERKALEMIA: Status: RESOLVED | Noted: 2018-10-14 | Resolved: 2018-10-23

## 2018-10-23 PROBLEM — G93.41 METABOLIC ENCEPHALOPATHY: Status: RESOLVED | Noted: 2018-10-14 | Resolved: 2018-10-23

## 2018-10-23 PROBLEM — A04.72 CLOSTRIDIUM DIFFICILE DIARRHEA: Status: RESOLVED | Noted: 2017-10-04 | Resolved: 2018-10-23

## 2018-10-23 LAB
ALBUMIN SERPL-MCNC: 2.93 G/DL (ref 3.2–4.8)
ANION GAP SERPL CALCULATED.3IONS-SCNC: 8 MMOL/L (ref 3–11)
BUN BLD-MCNC: 36 MG/DL (ref 9–23)
BUN/CREAT SERPL: 14.2 (ref 7–25)
CALCIUM SPEC-SCNC: 8.3 MG/DL (ref 8.7–10.4)
CHLORIDE SERPL-SCNC: 105 MMOL/L (ref 99–109)
CO2 SERPL-SCNC: 24 MMOL/L (ref 20–31)
CREAT BLD-MCNC: 2.53 MG/DL (ref 0.6–1.3)
DEPRECATED RDW RBC AUTO: 58.1 FL (ref 37–54)
ERYTHROCYTE [DISTWIDTH] IN BLOOD BY AUTOMATED COUNT: 16.7 % (ref 11.3–14.5)
GFR SERPL CREATININE-BSD FRML MDRD: 23 ML/MIN/1.73
GLUCOSE BLD-MCNC: 138 MG/DL (ref 70–100)
GLUCOSE BLDC GLUCOMTR-MCNC: 152 MG/DL (ref 70–130)
GLUCOSE BLDC GLUCOMTR-MCNC: 221 MG/DL (ref 70–130)
HCT VFR BLD AUTO: 28.5 % (ref 34.5–44)
HGB BLD-MCNC: 8.7 G/DL (ref 11.5–15.5)
MCH RBC QN AUTO: 29.8 PG (ref 27–31)
MCHC RBC AUTO-ENTMCNC: 30.5 G/DL (ref 32–36)
MCV RBC AUTO: 97.6 FL (ref 80–99)
PHOSPHATE SERPL-MCNC: 3.5 MG/DL (ref 2.4–5.1)
PLATELET # BLD AUTO: 219 10*3/MM3 (ref 150–450)
PMV BLD AUTO: 10.9 FL (ref 6–12)
POTASSIUM BLD-SCNC: 4.5 MMOL/L (ref 3.5–5.5)
RBC # BLD AUTO: 2.92 10*6/MM3 (ref 3.89–5.14)
SODIUM BLD-SCNC: 137 MMOL/L (ref 132–146)
WBC NRBC COR # BLD: 14.93 10*3/MM3 (ref 3.5–10.8)

## 2018-10-23 PROCEDURE — 99239 HOSP IP/OBS DSCHRG MGMT >30: CPT | Performed by: PHYSICIAN ASSISTANT

## 2018-10-23 PROCEDURE — 94799 UNLISTED PULMONARY SVC/PX: CPT

## 2018-10-23 PROCEDURE — 80069 RENAL FUNCTION PANEL: CPT | Performed by: INTERNAL MEDICINE

## 2018-10-23 PROCEDURE — 94760 N-INVAS EAR/PLS OXIMETRY 1: CPT

## 2018-10-23 PROCEDURE — 97110 THERAPEUTIC EXERCISES: CPT

## 2018-10-23 PROCEDURE — 82962 GLUCOSE BLOOD TEST: CPT

## 2018-10-23 PROCEDURE — 85027 COMPLETE CBC AUTOMATED: CPT | Performed by: INTERNAL MEDICINE

## 2018-10-23 PROCEDURE — 25010000002 HEPARIN (PORCINE) PER 1000 UNITS: Performed by: NURSE PRACTITIONER

## 2018-10-23 PROCEDURE — 94640 AIRWAY INHALATION TREATMENT: CPT

## 2018-10-23 RX ORDER — HEPARIN SODIUM 1000 [USP'U]/ML
1900 INJECTION, SOLUTION INTRAVENOUS; SUBCUTANEOUS AS NEEDED
Start: 2018-10-23 | End: 2019-02-11

## 2018-10-23 RX ORDER — ALBUMIN (HUMAN) 12.5 G/50ML
12.5 SOLUTION INTRAVENOUS AS NEEDED
Status: CANCELLED | OUTPATIENT
Start: 2018-10-24 | End: 2018-10-24

## 2018-10-23 RX ORDER — OXYCODONE HYDROCHLORIDE AND ACETAMINOPHEN 5; 325 MG/1; MG/1
1 TABLET ORAL EVERY 6 HOURS PRN
Qty: 6 TABLET | Refills: 0 | Status: SHIPPED | OUTPATIENT
Start: 2018-10-23 | End: 2018-10-28

## 2018-10-23 RX ORDER — UREA 10 %
3 LOTION (ML) TOPICAL NIGHTLY
Start: 2018-10-23 | End: 2019-04-05

## 2018-10-23 RX ADMIN — SERTRALINE HYDROCHLORIDE 50 MG: 50 TABLET ORAL at 09:33

## 2018-10-23 RX ADMIN — HYDRALAZINE HYDROCHLORIDE 100 MG: 50 TABLET, FILM COATED ORAL at 09:33

## 2018-10-23 RX ADMIN — PANTOPRAZOLE SODIUM 40 MG: 40 TABLET, DELAYED RELEASE ORAL at 08:48

## 2018-10-23 RX ADMIN — HEPARIN SODIUM 5000 UNITS: 5000 INJECTION, SOLUTION INTRAVENOUS; SUBCUTANEOUS at 09:33

## 2018-10-23 RX ADMIN — Medication 250 MG: at 09:33

## 2018-10-23 RX ADMIN — BUDESONIDE AND FORMOTEROL FUMARATE DIHYDRATE 2 PUFF: 160; 4.5 AEROSOL RESPIRATORY (INHALATION) at 07:24

## 2018-10-23 RX ADMIN — CARVEDILOL 25 MG: 12.5 TABLET, FILM COATED ORAL at 08:48

## 2018-10-23 RX ADMIN — Medication 3 ML: at 09:33

## 2018-10-23 RX ADMIN — SODIUM BICARBONATE 650 MG TABLET 1300 MG: at 09:33

## 2018-10-23 RX ADMIN — ISOSORBIDE MONONITRATE 30 MG: 30 TABLET, EXTENDED RELEASE ORAL at 09:33

## 2018-10-23 RX ADMIN — AMLODIPINE BESYLATE 10 MG: 10 TABLET ORAL at 09:32

## 2018-10-23 NOTE — THERAPY DISCHARGE NOTE
Acute Care - Occupational Therapy Discharge Summary  [facility]     Patient Name: Joy Bueno  : 1950  MRN: [id number]    Today's Date: 10/23/2018  Onset of Illness/Injury or Date of Surgery: 10/14/18    Date of Referral to OT: 10/14/18  Referring Physician: ethan      Admit Date: 10/14/2018        OT Recommendation and Plan    Visit Dx:    ICD-10-CM ICD-9-CM   1. Acute UTI N39.0 599.0   2. History of ESBL Klebsiella pneumoniae infection Z86.19 V12.09   3. Acute renal failure superimposed on chronic kidney disease, unspecified CKD stage, unspecified acute renal failure type (CMS/[facility]) N17.9 584.9    N18.9 585.9   4. Anemia of chronic disease D63.8 285.29   5. Impaired functional mobility, balance, gait, and endurance Z74.09 V49.89   6. Impaired mobility and ADLs Z74.09 799.89               Time Calculation- OT     Row Name 10/23/18 1050             Time Calculation- OT    OT Start Time 1050  -MITALI      OT Received On 10/23/18  -MITALI      OT Goal Re-Cert Due Date 10/26/18  -         Timed Charges    60640 - OT Therapeutic Exercise Minutes 12  -MITALI        User Key  (r) = Recorded By, (t) = Taken By, (c) = Cosigned By    Initials Name Provider Type    Cristy Alcantar, OT Occupational Therapist                  OT Rehab Goals     Row Name 10/23/18 1050             Transfer Goal 1 (OT)    Progress/Outcome (Transfer Goal 1, OT) goal not met;discharged from facility  -MITALI         Grooming Goal 1 (OT)    Progress/Outcome (Grooming Goal 1, OT) goal not met;discharged from facility   min level  -MITALI         Strength Goal 1 (OT)    Progress/Outcome (Strength Goal 1, OT) goal not met   could only tolerate one set RUE  -MITALI        User Key  (r) = Recorded By, (t) = Taken By, (c) = Cosigned By    Initials Name Provider Type Discipline    Cristy Alcantar, OT Occupational Therapist OT                Outcome Measures     Row Name 10/23/18 1050             How much help from another is currently needed...     Putting on and taking off regular lower body clothing? 1  -MITALI      Bathing (including washing, rinsing, and drying) 2  -MITALI      Toileting (which includes using toilet bed pan or urinal) 1  -MITALI      Putting on and taking off regular upper body clothing 2  -MITALI      Taking care of personal grooming (such as brushing teeth) 3  -MITALI      Eating meals 3  -MITALI      Score 12  -MITALI         Functional Assessment    Outcome Measure Options AM-PAC 6 Clicks Daily Activity (OT)  -MITALI        User Key  (r) = Recorded By, (t) = Taken By, (c) = Cosigned By    Initials Name Provider Type    Cristy Alcantar OT Occupational Therapist          Therapy Suggested Charges     Code   Minutes Charges    04522 (CPT®) Hc Ot Neuromusc Re Education Ea 15 Min      37699 (CPT®) Hc Ot Ther Proc Ea 15 Min 12 1    15132 (CPT®) Hc Ot Therapeutic Act Ea 15 Min      90103 (CPT®) Hc Ot Manual Therapy Ea 15 Min      89826 (CPT®) Hc Ot Iontophoresis Ea 15 Min      05771 (CPT®) Hc Ot Elec Stim Ea-Per 15 Min      98522 (CPT®) Hc Ot Ultrasound Ea 15 Min      66159 (CPT®) Hc Ot Self Care/Mgmt/Train Ea 15 Min      Total  12 1          Therapy Charges for Today     Code Description Service Date Service Provider Modifiers Qty    70169773880 HC OT THER PROC EA 15 MIN 10/23/2018 Cristy Holliday OT GO 1          OT Discharge Summary  Reason for Discharge: Discharge from facility  Outcomes Achieved: Other (slow progress to goals)  Discharge Destination: [facility]      Cristy Holliday OT  10/23/2018

## 2018-10-23 NOTE — PROGRESS NOTES
INFECTIOUS DISEASE PROGRESS NOTE    Joy Bueno  1950  [id number]      Admission Date: 10/14/2018      Requesting Provider: No ref. provider found  Evaluating Physician: Velasquez Olivo MD    Reason for Consultation:  ESBL Ecoli  UTI      History of present illness:    10/15/18: Patient is a 68 y.o. female seen today for a UTI.  She was brought from her nursing home with increasing lethargy, and  confusion over the past several days.  She was hospitalized here in August and treated with Invanz for an ESBL Ecoli UTI.  On admission, her urinalysis had too  Numerous to count white cells, and urine culture now with Gram negative rods . She is hypothermic,  With a leukocytosis of 14, 000,  hgb of 7.0, Scr 4.29. Ertapenem was initiated and we were consulted for evaluation and treatment.    10/16/18: She denies nausea and vomiting.  She has decreased dyspnea.  She is scheduled for hemodialysis access by Dr. Calderón.  I discussed her situation with Dr. Calderón today.  10/17/18:  She is now on nasal cannula.  She will  Have her dialysis access later today.  Remains afebrile.   10/18/18: She denies dysuria.  The nursing staff indicates she had approximately 4-5 100 cc of urine out over the day.  She underwent dialysis access placement yesterday.  She has remained afebrile.  10/19/18:  She is currently on HD.  She is more alert this am.  Complaining of hemorrhoids.  She remains afebrile.   10/22/18: She remains afebrile.  She has a long-standing cystic lesion on her cervical back region.  This has intermittently drained.  10/23/18: She has no new complaints today.  She denies increased back pain.  She denies fever, chills, and sweats.    Past Medical History:   Diagnosis Date   • Anemia    • Anxiety    • Asthma    • Chronic kidney disease    • Diabetes mellitus (CMS/[facility])    • Diabetes mellitus, type II (CMS/[facility]) 8/3/2018   • History of Clostridium difficile infection 8/3/2018   • History of respiratory failure,  since off home oxygen 8/3/2018   • History of UTI 8/3/2018   • Hypertension    • Morbid obesity (CMS/HCC)    • Osteoarthritis    • Tinea capitis 8/3/2018       Past Surgical History:   Procedure Laterality Date   • ARTERIOVENOUS FISTULA/SHUNT SURGERY Left 10/22/2018    Procedure: LEFT UPPER EXTREMITY ARTERIOVENOUS FISTULA CREATION;  Surgeon: Carlos Enrique Calderón MD;  Location:  [facility];  Service: Vascular   •  SECTION     • COLONOSCOPY N/A 2017    Procedure: COLONOSCOPY;  Surgeon: Mark I Brunner, MD;  Location:  [facility];  Service:    • ENDOSCOPY N/A 2017    Procedure: ESOPHAGOGASTRODUODENOSCOPY ;  Surgeon: Velasquez Call MD;  Location:  [facility];  Service:    • HERNIA REPAIR     • INSERTION HEMODIALYSIS CATHETER Right 10/17/2018    Procedure: HEMODIALYSIS CATHETER INSERTION;  Surgeon: Carlos Enrique Calderón MD;  Location:  [facility];  Service: General       Family History   Problem Relation Age of Onset   • Cardiomyopathy Mother    • Stroke Father    • Diabetes Father        Social History     Social History   • Marital status:      Spouse name: N/A   • Number of children: N/A   • Years of education: N/A     Occupational History   • Not on file.     Social History Main Topics   • Smoking status: Former Smoker     Packs/day: 0.25   • Smokeless tobacco: Not on file      Comment: unknown when quit, maybe last year   • Alcohol use No   • Drug use: No   • Sexual activity: No     Other Topics Concern   • Not on file     Social History Narrative    Mrs. Bueno is a 68 year old AA  female. She lives alone in [place] but has been in rehab for some time. She has a daughter. She does not have an advanced directive.       Allergies   Allergen Reactions   • Geodon [Ziprasidone Hcl] Unknown (See Comments)     PT DOESN'T REMEMBER   • Haldol [Haloperidol Lactate] Unknown (See Comments)     PT DOESN'T REMEMBER   • Seroquel [Quetiapine Fumarate] Unknown (See Comments)     PT  DOESN'T REMEMBER         Medication:  No current facility-administered medications for this encounter.     Current Outpatient Prescriptions:   •  acetaminophen (TYLENOL) 325 MG tablet, Take 650 mg by mouth Every 4 (Four) Hours As Needed for Mild Pain (1-3)., Disp: , Rfl:   •  albuterol (PROVENTIL HFA;VENTOLIN HFA) 108 (90 BASE) MCG/ACT inhaler, Inhale 2 puffs Every 4 (Four) Hours As Needed for Wheezing., Disp: , Rfl:   •  amLODIPine (NORVASC) 10 MG tablet, Take 10 mg by mouth Daily., Disp: , Rfl:   •  atorvastatin (LIPITOR) 10 MG tablet, Take 10 mg by mouth Every Night., Disp: , Rfl:   •  calcitriol (ROCALTROL) 0.25 MCG capsule, Take 0.25 mcg by mouth Daily., Disp: , Rfl:   •  carvedilol (COREG) 25 MG tablet, Take 25 mg by mouth 2 (Two) Times a Day With Meals., Disp: , Rfl:   •  Cholecalciferol (VITAMIN D3) 74288 units tablet, Take 50,000 Units by mouth Every 7 (Seven) Days., Disp: , Rfl:   •  hydrALAZINE (APRESOLINE) 100 MG tablet, Take 1 tablet by mouth 3 (Three) Times a Day., Disp: 90 tablet, Rfl: 0  •  isosorbide mononitrate (IMDUR) 30 MG 24 hr tablet, Take 30 mg by mouth Daily., Disp: , Rfl:   •  loratadine (CLARITIN) 10 MG tablet, Take 10 mg by mouth Daily., Disp: , Rfl:   •  mometasone-formoterol (DULERA 200) 200-5 MCG/ACT inhaler, Inhale 2 puffs 2 (Two) Times a Day., Disp: , Rfl:   •  Multiple Vitamins-Minerals (MULTIVITAMIN ADULTS PO), Take 1 tablet by mouth Daily., Disp: , Rfl:   •  NITROGLYCERIN SL, Place 0.4 mg under the tongue. PRN CHEST PAIN, Disp: , Rfl:   •  pantoprazole (PROTONIX) 40 MG EC tablet, Take 40 mg by mouth Daily., Disp: , Rfl:   •  polyethylene glycol (MIRALAX) packet, Take 17 g by mouth Daily., Disp: , Rfl:   •  promethazine (PHENERGAN) 25 MG tablet, Take 25 mg by mouth Every 6 (Six) Hours As Needed for Nausea or Vomiting., Disp: , Rfl:   •  risperiDONE (risperDAL) 0.25 MG tablet, Take 0.125 mg by mouth every night at bedtime., Disp: , Rfl:   •  saccharomyces boulardii (FLORASTOR) 250 MG  capsule, Take 1 capsule by mouth 2 (Two) Times a Day., Disp: , Rfl:   •  sertraline (ZOLOFT) 50 MG tablet, Take 50 mg by mouth Daily., Disp: , Rfl:   •  simethicone (MYLICON) 80 MG chewable tablet, Chew 80 mg Every 6 (Six) Hours As Needed for Flatulence., Disp: , Rfl:   •  castor oil-balsam peru (VENELEX) ointment, Apply 1 application topically Every 12 (Twelve) Hours., Disp: , Rfl:   •  Heparin Sodium, Porcine, (HEPARIN, PORCINE,) 1000 UNIT/ML injection, 1.9 mL by Intracatheter route As Needed (For Dialysis Catheter Care.)., Disp: , Rfl:   •  hydrocortisone (ANUSOL-HC) 2.5 % rectal cream, Insert  into the rectum 2 (Two) Times a Day As Needed for Hemorrhoids., Disp: , Rfl:   •  melatonin 1 MG tablet, Take 3 tablets by mouth Every Night., Disp: , Rfl:   •  miconazole (MICOTIN) 2 % powder, Apply  topically Every 12 (Twelve) Hours., Disp: 30 g, Rfl: 0  •  oxyCODONE-acetaminophen (PERCOCET) 5-325 MG per tablet, Take 1 tablet by mouth Every 6 (Six) Hours As Needed for Moderate Pain  for up to 5 days., Disp: 6 tablet, Rfl: 0  •  phenylephrine-mineral oil-petrolatum 0.25-14-74.9 % ointment hemorrhoidal ointment, Insert 1 application into the rectum 3 (Three) Times a Day As Needed (hemorrhoids)., Disp: , Rfl:     Antibiotics:  Anti-Infectives     Ordered     Dose/Rate Route Frequency Start Stop    10/14/18 151  ertapenem (INVanz) 1 g/100 mL 0.9% NS VTB (mbp)     Ordering Provider:  Raheel Brunson PA    1 g  over 30 Minutes Intravenous Once 10/14/18 1514 10/14/18 1701            Review of Systems:  No reliable ROS from patient     Physical Exam:   Vital Signs  Temp (24hrs), Av.1 °F (36.7 °C), Min:97.7 °F (36.5 °C), Max:98.5 °F (36.9 °C)    Temp  Min: 97.7 °F (36.5 °C)  Max: 98.5 °F (36.9 °C)  BP  Min: 125/90  Max: 186/72  Pulse  Min: 65  Max: 73  Resp  Min: 18  Max: 20  SpO2  Min: 94 %  Max: 100 %    GENERAL alert .  She is in no acute distress..   HEENT: Normocephalic, atraumatic.  PERRL. EOMI. No conjunctival  injection. No icterus. Oropharynx clear without evidence of thrush or exudate.   NECK: Supple without nuchal rigidity. No mass.  LYMPH: No cervical, axillary or inguinal lymphadenopathy.  HEART: RRR; No murmur, rubs, gallops.   LUNGS:  Few rhonchi  Anteriorly   ABDOMEN: Soft, nontender,firm,  bowel sounds quiet  No rebound or guarding. NO mass or HSM.  EXT:  No cyanosis, clubbing or edema. No cord.  : Genitalia generally unremarkable. purewick in place   MSK: FROM without joint effusions noted arms/legs.    SKIN: Warm and dry  Scalp eczema,   Neuro:  Alert and oriented ×3  Right Rian site ok.  Back: She has a 4 centimeter diameter fluctuant lesion over her mid cervical back region without associated erythema.  There is no active drainage.    Laboratory Data      Results from last 7 days  Lab Units 10/23/18  0513 10/20/18  0236 10/19/18  0659   WBC 10*3/mm3 14.93* 13.95* 12.97*   HEMOGLOBIN g/dL 8.7* 8.5* 8.6*   HEMATOCRIT % 28.5* 27.0* 27.3*   PLATELETS 10*3/mm3 219 197 205       Results from last 7 days  Lab Units 10/23/18  0514   SODIUM mmol/L 137   POTASSIUM mmol/L 4.5   CHLORIDE mmol/L 105   CO2 mmol/L 24.0   BUN mg/dL 36*   CREATININE mg/dL 2.53*   GLUCOSE mg/dL 138*   CALCIUM mg/dL 8.3*       Results from last 7 days  Lab Units 10/18/18  0638   ALK PHOS U/L 81   BILIRUBIN mg/dL 0.3   BILIRUBIN DIRECT mg/dL 0.1   ALT (SGPT) U/L 13   AST (SGOT) U/L 12           UA  TNTC WBCS              Estimated Creatinine Clearance: 30.2 mL/min (A) (by C-G formula based on SCr of 2.53 mg/dL (H)).      Microbiology:  Blood Culture   Date Value Ref Range Status   10/14/2018 No growth at less than 24 hours  Preliminary   10/14/2018 No growth at less than 24 hours  Preliminary                    Urine Culture   Date Value Ref Range Status   10/14/2018 >100,000 CFU/mL Gram Negative Bacilli (A)  Preliminary      Urine culture is growing ESBL Klebsiella        Radiology:  Imaging Results (last 72 hours)     Procedure Component  Value Units Date/Time    XR Chest 2 View [[id number]] Collected:  10/14/18 1443     Updated:  10/14/18 1653    Narrative:          EXAMINATION: XR CHEST 2 VW - 10/14/2018     INDICATION: Lethargy.      COMPARISON: None.     FINDINGS: Two-view chest reveals the heart to be enlarged. Underlying  chronic changes seen throughout the lung fields bilaterally. No evidence  of acute parenchymal disease. Pulmonary vascularity is pronounced. No  focal parenchymal opacification present. Degenerative change is seen  within the spine.           Impression:       Low lung volumes with chronic changes seen within the lung  fields, enlargement heart and no evidence of acute parenchymal disease.     DICTATED:   10/14/2018  EDITED/ls :   10/14/2018      This report was finalized on 10/14/2018 4:51 PM by Dr. Rosalie Kline MD.       CT Head Without Contrast [[id number]] Collected:  10/14/18 1441     Updated:  10/14/18 1652    Narrative:       EXAMINATION: CT HEAD WO CONTRAST - 10/12/2018     INDICATION: Lethargy, weakness.     TECHNIQUE: Multiple axial CT imaging is obtained of the head from skull  base to skull vertex without the administration of intravenous contrast.     The radiation dose reduction device was turned on for each scan per the  ALARA (As Low as Reasonably Achievable) protocol.     COMPARISON: None.     FINDINGS: The parenchyma is grossly unremarkable. Some low-density area  is seen in the periventricular and subcortical white matter suggesting  chronic small vessel ischemic change. There is no hemorrhage or  hydrocephalus. No mass, mass effect or midline shift. Bony structures  reveal no evidence of osseous abnormality. There is mucosal thickening  of the left maxillary sinus and ethmoid air cells. Globes and orbits are  intact. The mastoid air cells are patent.       Impression:       No acute intracranial abnormality with chronic changes seen  within the brain.     DICTATED:   10/14/2018  EDITED/ls :    10/14/2018      This report was finalized on 10/14/2018 4:50 PM by Dr. Rosalie Kline MD.               Impression:   1.  ESBL Klebsiella UTI- status post Invanz therapy   2.  Anemia-severe.   3.  Acute/chronic renal failure/ATN   4.  History of Cdiff-she will remain at risk for relapse of C. difficile colitis due to the need to treat her UTI.  5.  Diabetes Mellitus, type 2-this places her at increased risk for recurrent infections  6.  Encephalopathy-toxic metabolic-this may be secondary to her UTI  7.  Cervical back cyst-this appears to be a sebaceous cyst.  She has no activity limitation.  If she develops inflammation, she will require an I&D of this area.        PLAN/RECOMMENDATIONS:   1. Monitor off antibiotics   2. Discharge soon      Dr. Olivo has obtained the history, performed the physical exam and formulated the above treatment plan.     Velasquez Olivo MD  10/23/2018  6:47 PM

## 2018-10-23 NOTE — DISCHARGE PLACEMENT REQUEST
"Pam Loyd  (68 y.o. Female)     Date of Birth Social Security Number Address Home Phone MRN    1950  [address] [facility]  [address]  [place] [id number] [phone] [id number]    [employer] Marital Status          None        Admission Date Admission Type Admitting Provider Attending Provider Department, Room/Bed    10/14/18 Emergency Sara Krause MD Howard, Gabriela Kirk, MD [address], S[address]/1    Discharge Date Discharge Disposition Discharge Destination         Skilled Nursing Facility (DC - External)              Attending Provider:  Sara Krause MD    Allergies:  Geodon [Ziprasidone Hcl], Haldol [Haloperidol Lactate], Seroquel [Quetiapine Fumarate]    Isolation:  Contact   Infection:  ESBL Klebsiella (08/05/18)   Code Status:  CPR    Ht:  165.1 cm (65\")   Wt:  136 kg (300 lb)    Admission Cmt:  None   Principal Problem:  Severe sepsis (CMS/HCC) [A41.9,R65.20]                 Active Insurance as of 10/14/2018     Primary Coverage     Payor Plan Insurance Group Employer/Plan Group    MEDICARE MEDICARE A & B      Payor Plan Address Payor Plan Phone Number Effective From Effective To    [address] [phone] 5/1/2016     [facility] [id number]       Subscriber Name Subscriber Birth Date Member ID       PAM LOYD 1950 [id number]           Secondary Coverage     Payor Plan Insurance Group Employer/Plan Group    [place]      Payor Plan Address Payor Plan Phone Number Effective From Effective To    [address] [phone] 8/2/2018     [place] [address]       Subscriber Name Subscriber Birth Date Member ID       PAM LOYD 1950 [id number]                 Emergency Contacts      (Rel.) Home Phone Work Phone Mobile Phone    Tessie Dorado (Daughter) [phone] --               Discharge Summary      Mallory Zaragoza PA-C at 10/23/2018  9:25 AM              [facility] " [facility] Medicine Services  DISCHARGE SUMMARY    Patient Name: Joy Bueno  : 1950  MRN: [id number]    Date of Admission: 10/14/2018  Date of Discharge:  10/23/2018  Primary Care Physician: Provider, No Known    Consults     Date and Time Order Name Status Description    10/15/2018 1444 Inpatient General Surgery Consult Completed     10/14/2018 1914 Inpatient Infectious Diseases Consult Completed     10/14/2018 1812 Inpatient Nephrology Consult Completed         Hospital Course     Presenting Problem:   Sepsis (CMS/[facility]) [A41.9]    Active Hospital Problems    Diagnosis Date Noted   • Chronic diastolic heart failure (CMS/HCC) [I50.32] 10/23/2018   • Anemia of chronic renal failure, stage 5 (CMS/[facility]) [N18.5, D63.1] 2018   • Diabetes mellitus, type II (CMS/[facility]) [E11.9] 2018   • History of Clostridium difficile infection [Z86.19] 2018   • Generalized weakness [R53.1] 10/09/2017   • Hypertension [I10] 2017   • ESRD (end stage renal disease) (CMS/[facility]) [N18.6] 2017   • Morbid obesity (CMS/[facility]) [E66.01]       Resolved Hospital Problems    Diagnosis Date Noted Date Resolved   • **Severe sepsis (CMS/HCC) [A41.9, R65.20] 10/14/2018 10/23/2018   • Hyperkalemia [E87.5] 10/14/2018 10/23/2018   • Metabolic encephalopathy [G93.41] 10/14/2018 10/23/2018   • Acute on chronic diastolic heart failure (CMS/HCC) [I50.33] 2017 10/23/2018   • UTI (urinary tract infection) [N39.0] 10/04/2017 10/23/2018   • Leukocytosis [D72.829] 2017 10/23/2018      Hospital Course:  Ms. Joy Bueno is a 69yo female resident of [facility] with PMH significant for HTN, diastolic heart failure, DMII, prior C. Diff infection, CKD IV (secondary to diabetic nephropathy) and anemia of chronic disease. She was last admitted to [facility] -2018 for worsening BUN/creatinine and Hgb. She was found to have gastritis. She received PRBCs and procrit. No definitive cause of her  anemia was found and she was to follow up with GI outpatient but did not attend the appointment. She was also found to have ESBL Klebsiella UTI and was treated with Invanz.     She returned to [facility] ED on 10/14/18 for evaluation of a 5 day history of progressive confusion and lethargy. ED evaluation revealed WBcs 11.79 with 72% neutrophils. UA consistent with UTI. Hgb 5.1/Hct 17.1. Creatinine 4.33 and potassium 5.8. BNP elevated to 682 and patient hypoxia. ABG revealed respiratory and metabolic acidosis. She was admitted to the hospital medicine service. She was started on IV invanz and transfused 1 unit PRBCs. Nephrology and ID were consulted.     Dr. Velasquez Olivo of [facility] evaluated the patient. She was treated for ESBL Klebsiella UTI with Invanz. Blood cultures negative at 5 days. Encephalopathy has resolved.   Anemia has improved. She received a total of 4 units PRBCs during her hospitalization.     Dr. Nova of nephrology recommended dialysis. The patient was agreeable and a tunneled catheter was placed on 10/17 by Dr. Calderón. She has tolerated dialysis well. Dr. Calderón created AV fistula on 10/22/18. No immediate post-operative complications. Outpatient dialysis has been arranged with [facility] MWF @ 7:00am chair time. [facility] will transport between 6-6:30am.     Ms. Bueno is improved. She will return to [facility] in stable condition on 10/23/2018.   Follow up with Dr. Calderón in 3 weeks.     Day of Discharge     HPI:   Laying in bed, she is doing well. Concerned that she hasn't peed yet today. Offered bladder scan to evaluate bladder contents but also educated her that she may not make much urine with dialysis. Asked how much fluid was taken off during dialysis yesterday. No chest pain or dyspnea. AV fistula hurts but overall, pain is controlled.     Review of Systems  Gen- No fevers, chills  CV- No chest pain, palpitations  Resp- No cough, dyspnea  GI-  No N/V/D, abd pain    Otherwise ROS is negative except as mentioned in the HPI.    Vital Signs:   Temp:  [97.5 °F (36.4 °C)-98.5 °F (36.9 °C)] 98.5 °F (36.9 °C)  Heart Rate:  [65-80] 72  Resp:  [18-20] 18  BP: (101-186)/(56-90) 143/58     Physical Exam:  Constitutional: No acute distress, awake, alert. Morbidly obese, chronically-ill appearing.   HENT: NCAT, mucous membranes moist  Respiratory: Clear to auscultation bilaterally, respiratory effort normal   Cardiovascular: RRR, no murmurs, rubs, or gallops, palpable pedal pulses bilaterally  Gastrointestinal: Positive bowel sounds, soft, nontender, nondistended  Musculoskeletal: No bilateral ankle edema  Psychiatric: Appropriate affect, cooperative  Neurologic: Oriented x 3, strength symmetric in all extremities, Cranial Nerves grossly intact to confrontation, speech clear  Skin: No rashes. AV fistula wound is dressed, not removed for exam. No surrounding erythema or induration.     Pertinent  and/or Most Recent Results       Results from last 7 days  Lab Units 10/23/18  0514 10/23/18  0513 10/20/18  0236 10/19/18  0659 10/18/18  0638 10/17/18  0543 10/16/18  1936   WBC 10*3/mm3  --  14.93* 13.95* 12.97*  --   --   --    HEMOGLOBIN g/dL  --  8.7* 8.5* 8.6*  --  9.0* 6.8*   HEMATOCRIT %  --  28.5* 27.0* 27.3*  --  29.0* 22.9*   PLATELETS 10*3/mm3  --  219 197 205  --   --   --    SODIUM mmol/L 137  --  141 142 142  --   --    POTASSIUM mmol/L 4.5  --  3.6 4.0 5.0  --   --    CHLORIDE mmol/L 105  --  110* 111* 113*  --   --    CO2 mmol/L 24.0  --  25.0 22.0 20.0  --   --    BUN mg/dL 36*  --  39* 56* 78*  --   --    CREATININE mg/dL 2.53*  --  2.47* 3.34* 4.50*  --   --    GLUCOSE mg/dL 138*  --  141* 103* 110*  --   --    CALCIUM mg/dL 8.3*  --  8.4* 8.6* 8.7  --   --        Results from last 7 days  Lab Units 10/18/18  0638   BILIRUBIN mg/dL 0.3   ALK PHOS U/L 81   ALT (SGPT) U/L 13   AST (SGOT) U/L 12       Results from last 7 days  Lab Units 10/20/18  023    TROPONIN I ng/mL 0.019     Brief Urine Lab Results  (Last result in the past 365 days)      Color   Clarity   Blood   Leuk Est   Nitrite   Protein   CREAT   Urine HCG        10/14/18 1340 Yellow Turbid(A) Small (1+)(A) Large (3+)(A) Negative 100 mg/dL (2+)(A)             Microbiology Results Abnormal     Procedure Component Value - Date/Time    Wound Culture - Wound, Back, Upper [[id number]] Collected:  10/21/18 1201    Lab Status:  Preliminary result Specimen:  Wound from Back, Upper Updated:  10/21/18 2211     Gram Stain Result Moderate (3+) WBCs seen      No organisms seen    Blood Culture - Blood, [[id number]]  (Normal) Collected:  10/14/18 1606    Lab Status:  Final result Specimen:  Blood from Arm, Right Updated:  10/19/18 1630     Blood Culture No growth at 5 days    Blood Culture - Blood, [[id number]]  (Normal) Collected:  10/14/18 1606    Lab Status:  Final result Specimen:  Blood from Arm, Left Updated:  10/19/18 1630     Blood Culture No growth at 5 days    Urine Culture - Urine, [[id number]]  (Abnormal)  (Susceptibility) Collected:  10/14/18 1340    Lab Status:  Final result Specimen:  Urine from Urine, Catheter Updated:  10/16/18 1403     Urine Culture >100,000 CFU/mL Klebsiella pneumoniae ESBL (C)     Comment:   Consider infectious disease consult.  Susceptibility results may not correlate to clinical outcomes.       Susceptibility      Klebsiella pneumoniae ESBL     AL     Ciprofloxacin >2 ug/ml Resistant     Ertapenem <=1 ug/ml Susceptible     Gentamicin <=4 ug/ml Susceptible     Levofloxacin >4 ug/ml Resistant     Meropenem <=1 ug/ml Susceptible     Nitrofurantoin 64 ug/ml Intermediate     Piperacillin + Tazobactam <=16 ug/ml Susceptible     Tetracycline >8 ug/ml Resistant     Tobramycin >8 ug/ml Resistant     Trimethoprim + Sulfamethoxazole >2/38 ug/ml Resistant                        Imaging Results (all)     Procedure Component Value Units Date/Time    FL C Arm During Surgery [[id number]]  Collected:  10/18/18 0839     Updated:  10/18/18 0853    Narrative:       EXAMINATION: FL C ARM DURING SURGERY- 10/17/2018     INDICATION: Hemodialysis Catheter Insertion; N39.0-Urinary tract  infection, site not specified; Z86.19-Personal history of other  infectious and parasitic diseases; N17.9-Acute kidney failure,  unspecified; N18.9-Chronic kidney disease, unspecified; D63.8-Anemia in  other chronic diseases classified elsewhere; Z74.09-Other reduced  mobility       TECHNIQUE: Intraoperative fluoroscopy for improved localization and  treatment planning      COMPARISON: Chest x-ray 10/14/2018     FINDINGS: Intraoperative fluoroscopy with total fluoroscopic time usage  21 seconds and 4 representative images saved during right-sided internal  jugular approach tunneled dialysis catheter placement.       Impression:       Intraoperative fluoroscopy utilized during right-sided  tunneled dialysis catheter placement.     D:  10/18/2018  E:  10/18/2018         This report was finalized on 10/18/2018 8:51 AM by Dr. Ashu Romero.       XR Chest 1 View [[id number]] Collected:  10/17/18 1948     Updated:  10/17/18 2141    Narrative:       EXAM:    XR Chest, 1 View     EXAM DATE/TIME:    10/17/2018 7:48 PM     CLINICAL HISTORY:    68 years old, female; Anemia in other chronic diseases classified elsewhere;   Acute kidney failure, unspecified; Chronic kidney disease, unspecified; Urinary   tract infection, site Not specified; Other reduced mobility; Other reduced   mobility; Personal history of other infectious and parasitic diseases; Device   placement; Other: Rij line placement     TECHNIQUE:    XR of the chest, 1 view.     COMPARISON:    CR XR CHEST 2 VW 10/14/2018 2:24 PM. CT Chest 11/04/2017, images requested for   retrieval from archive for comparison, initially off line. Report available.    FINDINGS:    Tubes, catheters and devices:  Right jugular double-lumen catheter placed with   distal tip near SVC-RA junction.  Impression    Lungs:  Small bilateral suprahilar nodular densities or pulmonary vessels seen   on end, present on the left on prior study, but now apparent on right since   10/14/2018.  CT Chest 11/04/2017 no obvious nodules there.  If desired,   followup CT could rule out true nodularity versus prominence of pulmonary   vessels. Mildly increased interstitial markings of lungs may represent mild   interstitial edema.    Pleural space:  Mild biapical pleural thickening/scarring. No significant   pleural effusion. No pneumothorax.    Heart/Mediastinum:  Mild cardiomegaly. No acute mediastinal abnormality   compared to prior study.    Bones/joints:  Degenerative changes of spine. Mild scoliosis.    Other findings:  Larger body habitus obscure some resolution.       Impression:       1. Small bilateral superhilar nodularity versus slightly prominent pulmonary   vessels. See above.   2. Mild bilateral interstitial markings, possibly edema.   3. Mild cardiomegaly.     THIS DOCUMENT HAS BEEN ELECTRONICALLY SIGNED BY MAYO ELDER MD    XR Chest 2 View [[id number]] Collected:  10/14/18 1443     Updated:  10/14/18 1653    Narrative:          EXAMINATION: XR CHEST 2 VW - 10/14/2018     INDICATION: Lethargy.      COMPARISON: None.     FINDINGS: Two-view chest reveals the heart to be enlarged. Underlying  chronic changes seen throughout the lung fields bilaterally. No evidence  of acute parenchymal disease. Pulmonary vascularity is pronounced. No  focal parenchymal opacification present. Degenerative change is seen  within the spine.           Impression:       Low lung volumes with chronic changes seen within the lung  fields, enlargement heart and no evidence of acute parenchymal disease.     DICTATED:   10/14/2018  EDITED/ls :   10/14/2018      This report was finalized on 10/14/2018 4:51 PM by Dr. Rosalie Kline MD.       CT Head Without Contrast [[id number]] Collected:  10/14/18 1441     Updated:  10/14/18 1655     Narrative:       EXAMINATION: CT HEAD WO CONTRAST - 10/12/2018     INDICATION: Lethargy, weakness.     TECHNIQUE: Multiple axial CT imaging is obtained of the head from skull  base to skull vertex without the administration of intravenous contrast.     The radiation dose reduction device was turned on for each scan per the  ALARA (As Low as Reasonably Achievable) protocol.     COMPARISON: None.     FINDINGS: The parenchyma is grossly unremarkable. Some low-density area  is seen in the periventricular and subcortical white matter suggesting  chronic small vessel ischemic change. There is no hemorrhage or  hydrocephalus. No mass, mass effect or midline shift. Bony structures  reveal no evidence of osseous abnormality. There is mucosal thickening  of the left maxillary sinus and ethmoid air cells. Globes and orbits are  intact. The mastoid air cells are patent.       Impression:       No acute intracranial abnormality with chronic changes seen  within the brain.     DICTATED:   10/14/2018  EDITED/ls :   10/14/2018      This report was finalized on 10/14/2018 4:50 PM by Dr. Rosalie Kline MD.           Results for orders placed during the hospital encounter of 10/04/17   Duplex Venous Lower Extremity - Bilateral CAR    Narrative · Normal bilateral lower extremity venous duplex scan.        Results for orders placed during the hospital encounter of 09/02/17   Adult Transthoracic Echo Complete    Narrative · Left ventricular wall thickness is consistent with mild concentric   hypertrophy.  · Left atrial cavity size is mildly dilated.  · Mild mitral valve regurgitation is present  · calcification of the aortic valve  · Mild tricuspid valve regurgitation is present.         Order Current Status    Wound Culture - Wound, Back, Upper Preliminary result        Discharge Details        Discharge Medications      New Medications      Instructions Start Date   heparin (porcine) 1000 UNIT/ML injection   1,900 Units,  Intracatheter, As Needed      hydrocortisone 2.5 % rectal cream  Commonly known as:  ANUSOL-HC   Rectal, 2 Times Daily PRN      oxyCODONE-acetaminophen 5-325 MG per tablet  Commonly known as:  PERCOCET   1 tablet, Oral, Every 6 Hours PRN      phenylephrine-mineral oil-petrolatum 0.25-14-74.9 % ointment hemorrhoidal ointment   1 application, Rectal, 3 Times Daily PRN         Continue These Medications      Instructions Start Date   acetaminophen 325 MG tablet  Commonly known as:  TYLENOL   650 mg, Oral, Every 4 Hours PRN      albuterol 108 (90 Base) MCG/ACT inhaler  Commonly known as:  PROVENTIL HFA;VENTOLIN HFA   2 puffs, Inhalation, Every 4 Hours PRN      amLODIPine 10 MG tablet  Commonly known as:  NORVASC   10 mg, Oral, Daily      atorvastatin 10 MG tablet  Commonly known as:  LIPITOR   10 mg, Oral, Nightly      calcitriol 0.25 MCG capsule  Commonly known as:  ROCALTROL   0.25 mcg, Oral, Daily      carvedilol 25 MG tablet  Commonly known as:  COREG   25 mg, Oral, 2 Times Daily With Meals      castor oil-balsam peru ointment   1 application, Topical, Every 12 Hours Scheduled      hydrALAZINE 100 MG tablet  Commonly known as:  APRESOLINE   100 mg, Oral, 3 Times Daily      isosorbide mononitrate 30 MG 24 hr tablet  Commonly known as:  IMDUR   30 mg, Oral, Daily      loratadine 10 MG tablet  Commonly known as:  CLARITIN   10 mg, Oral, Daily      melatonin 1 MG tablet   3 mg, Oral, Nightly      miconazole 2 % powder  Commonly known as:  MICOTIN   Topical, Every 12 Hours Scheduled      mometasone-formoterol 200-5 MCG/ACT inhaler  Commonly known as:  DULERA 200   2 puffs, Inhalation, 2 Times Daily - RT      MULTIVITAMIN ADULTS PO   1 tablet, Oral, Daily      NITROGLYCERIN SL   0.4 mg, Sublingual, PRN CHEST PAIN       pantoprazole 40 MG EC tablet  Commonly known as:  PROTONIX   40 mg, Oral, Daily      polyethylene glycol packet  Commonly known as:  MIRALAX   17 g, Oral, Daily      promethazine 25 MG tablet  Commonly  known as:  PHENERGAN   25 mg, Oral, Every 6 Hours PRN      risperiDONE 0.25 MG tablet  Commonly known as:  risperDAL   0.125 mg, Oral, Every Night at Bedtime      saccharomyces boulardii 250 MG capsule  Commonly known as:  FLORASTOR   250 mg, Oral, 2 Times Daily      sertraline 50 MG tablet  Commonly known as:  ZOLOFT   50 mg, Oral, Daily      simethicone 80 MG chewable tablet  Commonly known as:  MYLICON   80 mg, Oral, Every 6 Hours PRN      Vitamin D3 68218 units tablet   50,000 Units, Oral, Every 7 Days         Stop These Medications    ferrous sulfate 325 (65 FE) MG tablet     furosemide 40 MG tablet  Commonly known as:  LASIX     glipiZIDE 5 MG ER tablet  Commonly known as:  GLUCOTROL XL     potassium chloride 20 MEQ CR tablet  Commonly known as:  K-DUR,KLOR-CON     traZODone 25 MG half tablet  Commonly known as:  DESYREL     vitamin C 250 MG tablet  Commonly known as:  ASCORBIC ACID          Discharge Disposition:  Skilled Nursing Facility (DC - External)    Discharge Diet:  Regular diet. Cardiac, consistent CHO.  Fluid restriction (1000mL 7a-7p, 500mL 7p-7a) = total 1500mL/24H    Discharge Activity:   Activity Instructions     Activity as Tolerated           Code Status/Level of Support:  Code Status and Medical Interventions:   Ordered at: 10/14/18 1942     Level Of Support Discussed With:    Patient     Code Status:    CPR     Medical Interventions (Level of Support Prior to Arrest):    Full     No future appointments.    Additional Instructions for the Follow-ups that You Need to Schedule     Discharge Follow-up with Specified Provider: Follow up with Dr. Calderón in 3 weeks    As directed      To:  Follow up with Dr. Calderón in 3 weeks             Time Spent on Discharge:  50 minutes    Electronically signed by Mallory Zaragoza PA-C, 10/23/18, 11:10 AM.              Electronically signed by Mallory Zaragoza PA-C at 10/23/2018 11:14 AM

## 2018-10-23 NOTE — PROGRESS NOTES
"   LOS: 9 days    Patient Care Team:  Provider, No Known as PCP - General    Reason For Visit:  F/U ESRD.  Subjective           Review of Systems:    Pulm: No soa   CV:  No CP      Objective       amLODIPine 10 mg Oral Daily   atorvastatin 10 mg Oral Nightly   budesonide-formoterol 2 puff Inhalation BID - RT   carvedilol 25 mg Oral BID With Meals   epoetin porter 20,000 Units Subcutaneous Once per day on Mon Wed Fri   heparin (porcine) 5,000 Units Subcutaneous Q12H   hydrALAZINE 100 mg Oral TID   insulin lispro 0-7 Units Subcutaneous 4x Daily With Meals & Nightly   insulin lispro 0-7 Units Subcutaneous 4x Daily With Meals & Nightly   isosorbide mononitrate 30 mg Oral Daily   pantoprazole 40 mg Oral Q AM   polyethylene glycol 17 g Oral Daily   saccharomyces boulardii 250 mg Oral BID   sertraline 50 mg Oral Daily   sodium bicarbonate 1,300 mg Oral BID   sodium chloride 3 mL Intravenous Q12H       lactated ringers 9 mL/hr Last Rate: Stopped (10/17/18 2001)         Vital Signs:  Blood pressure 130/54, pulse 70, temperature 97.7 °F (36.5 °C), temperature source Oral, resp. rate 20, height 165.1 cm (65\"), weight 136 kg (300 lb), SpO2 100 %.    Flowsheet Rows      First Filed Value   Admission Height  165.1 cm (65\") Documented at 10/14/2018 1215   Admission Weight  136 kg (300 lb) Documented at 10/14/2018 1215          10/22 0701 - 10/23 0700  In: 360 [P.O.:240; I.V.:120]  Out: 4130     Physical Exam:    General Appearance: NAD, alert and cooperative, Ox3  Eyes: PER, conjunctivae and sclerae normal, no icterus  Lungs: respirations regular and unlabored, no crepitus, clear to auscultation  Heart/CV: regular rhythm & normal rate, no murmur, no gallop, no rub and no edema  Abdomen: not distended, soft, non-tender, no masses,  bowel sounds present  Skin: No rash, Warm and dry. TUNNELED HD CATH. AVF LUE + THRILL.    Radiology:            Labs:    Results from last 7 days  Lab Units 10/23/18  0513 10/20/18  0236 10/19/18  0659 "   WBC 10*3/mm3 14.93* 13.95* 12.97*   HEMOGLOBIN g/dL 8.7* 8.5* 8.6*   HEMATOCRIT % 28.5* 27.0* 27.3*   PLATELETS 10*3/mm3 219 197 205       Results from last 7 days  Lab Units 10/23/18  0514 10/20/18  0236 10/19/18  0659 10/18/18  0638   SODIUM mmol/L 137 141 142 142   POTASSIUM mmol/L 4.5 3.6 4.0 5.0   CHLORIDE mmol/L 105 110* 111* 113*   CO2 mmol/L 24.0 25.0 22.0 20.0   BUN mg/dL 36* 39* 56* 78*   CREATININE mg/dL 2.53* 2.47* 3.34* 4.50*   CALCIUM mg/dL 8.3* 8.4* 8.6* 8.7   PHOSPHORUS mg/dL 3.5  --  4.1 5.8*   ALBUMIN g/dL 2.93*  --  3.09* 3.20       Results from last 7 days  Lab Units 10/23/18  0514   GLUCOSE mg/dL 138*         Results from last 7 days  Lab Units 10/18/18  0638   ALK PHOS U/L 81   BILIRUBIN mg/dL 0.3   BILIRUBIN DIRECT mg/dL 0.1   ALT (SGPT) U/L 13   AST (SGOT) U/L 12                 Estimated Creatinine Clearance: 30.2 mL/min (A) (by C-G formula based on SCr of 2.53 mg/dL (H)).      Assessment       Hypertension    Morbid obesity (CMS/HCC)    ESRD (end stage renal disease) (CMS/[facility])    Generalized weakness    Diabetes mellitus, type II (CMS/[facility])    History of Clostridium difficile infection    Anemia of chronic renal failure, stage 5 (CMS/HCC)    Chronic diastolic heart failure (CMS/HCC)            Impression: ESRD. ANEMIA.          Recommendations: HOME SOON OK WITH RENAL.      Rocky Nova MD  10/23/18  12:07 PM

## 2018-10-23 NOTE — PROGRESS NOTES
Continued Stay Note  [facility]     Patient Name: Joy Bueno  MRN: [id number]  Today's Date: 10/23/2018    Admit Date: 10/14/2018          Discharge Plan     Row Name 10/23/18 0951       Plan    Plan Comments TREVOR followed up with Keshawn at [facility] in admissions. Keshawn reports they can accept pt back and pt has a bed today. TREVOR updated PA. TREVOR updated pt at bedside and pt agreeable. Pt reports she isgoing to need an ambulance as she has her wheelchair at [facility] so wouldn't be able to use wheels. TREVOR called [facility] and scheduled an ambulance for 1:00pm. TREVOR called and left a voicemail to update pt's daughter. [facility] will need a discharge summary and hard scripts for any controlled medications. TREVOR will fax discharge summary to [phone]. Number to call report is [phone] and pt is going to the [facility] unit. Pt has also been set up for tomorrow for outpatient dialysis at [facility] to start at 7:00am. TREVOR arranged for wheels transportation for dialysis tomorrow to [facility] from [facility]. Wheels will pick pt up at [facility] tomorrow between 6:00-6:30am and take to [facility].                Discharge Codes    No documentation.           ROGELIO Maloney

## 2018-10-23 NOTE — PROGRESS NOTES
"General Surgery Post op Follow Up Note    Subjective:   No new complaints.    /58 (BP Location: Left leg, Patient Position: Lying)   Pulse 72   Temp 98.5 °F (36.9 °C) (Oral)   Resp 18   Ht 165.1 cm (65\")   Wt 136 kg (300 lb)   SpO2 100%   BMI 49.92 kg/m²     General Appearance:  in no acute distress  LUE: thrill in the graft noted, incisions dressed.    CBC    Results from last 7 days  Lab Units 10/23/18  0513   WBC 10*3/mm3 14.93*   HEMOGLOBIN g/dL 8.7*   HEMATOCRIT % 28.5*   PLATELETS 10*3/mm3 219       CMP/BMP    Results from last 7 days  Lab Units 10/23/18  0514  10/18/18  0638   SODIUM mmol/L 137  < > 142   POTASSIUM mmol/L 4.5  < > 5.0   CHLORIDE mmol/L 105  < > 113*   CO2 mmol/L 24.0  < > 20.0   BUN mg/dL 36*  < > 78*   CREATININE mg/dL 2.53*  < > 4.50*   CALCIUM mg/dL 8.3*  < > 8.7   BILIRUBIN mg/dL  --   --  0.3   ALK PHOS U/L  --   --  81   ALT (SGPT) U/L  --   --  13   AST (SGOT) U/L  --   --  12   GLUCOSE mg/dL 138*  < > 110*   < > = values in this interval not displayed.      ASSESSMENT/PLAN:    ESRD  Remove the LUE dressings tomorrow.  May sponge bathe as needed.    Follow up in 3 weeks, [phone], [facility].    Carlos Enrique Calderón MD  10/23/2018  9:26 AM  "

## 2018-10-23 NOTE — DISCHARGE SUMMARY
[facility] Hospital Medicine Services  DISCHARGE SUMMARY    Patient Name: Joy Bueno  : 1950  MRN: [id number]    Date of Admission: 10/14/2018  Date of Discharge:  10/23/2018  Primary Care Physician: Provider, No Known    Consults     Date and Time Order Name Status Description    10/15/2018 1444 Inpatient General Surgery Consult Completed     10/14/2018 1914 Inpatient Infectious Diseases Consult Completed     10/14/2018 1812 Inpatient Nephrology Consult Completed         Hospital Course     Presenting Problem:   Sepsis (CMS/HCC) [A41.9]    Active Hospital Problems    Diagnosis Date Noted   • Chronic diastolic heart failure (CMS/HCC) [I50.32] 10/23/2018   • Anemia of chronic renal failure, stage 5 (CMS/HCC) [N18.5, D63.1] 2018   • Diabetes mellitus, type II (CMS/HCC) [E11.9] 2018   • History of Clostridium difficile infection [Z86.19] 2018   • Generalized weakness [R53.1] 10/09/2017   • Hypertension [I10] 2017   • ESRD (end stage renal disease) (CMS/HCC) [N18.6] 2017   • Morbid obesity (CMS/HCC) [E66.01]       Resolved Hospital Problems    Diagnosis Date Noted Date Resolved   • **Severe sepsis (CMS/HCC) [A41.9, R65.20] 10/14/2018 10/23/2018   • Hyperkalemia [E87.5] 10/14/2018 10/23/2018   • Metabolic encephalopathy [G93.41] 10/14/2018 10/23/2018   • Acute on chronic diastolic heart failure (CMS/HCC) [I50.33] 2017 10/23/2018   • UTI (urinary tract infection) [N39.0] 10/04/2017 10/23/2018   • Leukocytosis [D72.829] 2017 10/23/2018      Hospital Course:  Ms. Joy Bueno is a 69yo female resident of [facility] with PMH significant for HTN, diastolic heart failure, DMII, prior C. Diff infection, CKD IV (secondary to diabetic nephropathy) and anemia of chronic disease. She was last admitted to [facility] -2018 for worsening BUN/creatinine and Hgb. She was found to have gastritis. She received PRBCs and procrit. No  definitive cause of her anemia was found and she was to follow up with GI outpatient but did not attend the appointment. She was also found to have ESBL Klebsiella UTI and was treated with Invanz.     She returned to [facility] ED on 10/14/18 for evaluation of a 5 day history of progressive confusion and lethargy. ED evaluation revealed WBcs 11.79 with 72% neutrophils. UA consistent with UTI. Hgb 5.1/Hct 17.1. Creatinine 4.33 and potassium 5.8. BNP elevated to 682 and patient hypoxia. ABG revealed respiratory and metabolic acidosis. She was admitted to the hospital medicine service. She was started on IV invanz and transfused 1 unit PRBCs. Nephrology and ID were consulted.     Dr. Velasquez Olivo of [facility] evaluated the patient. She was treated for ESBL Klebsiella UTI with Invanz. Blood cultures negative at 5 days. Encephalopathy has resolved.   Anemia has improved. She received a total of 4 units PRBCs during her hospitalization.     Dr. Nova of nephrology recommended dialysis. The patient was agreeable and a tunneled catheter was placed on 10/17 by Dr. Calderón. She has tolerated dialysis well. Dr. Calderón created AV fistula on 10/22/18. No immediate post-operative complications. Outpatient dialysis has been arranged with [facility] ([facility] MWF @ 7:00am chair time. Wheels will transport between 6-6:30am.     Ms. Bueno is improved. She will return to [facility] in stable condition on 10/23/2018.   Follow up with Dr. Calderón in 3 weeks.     Day of Discharge     HPI:   Laying in bed, she is doing well. Concerned that she hasn't peed yet today. Offered bladder scan to evaluate bladder contents but also educated her that she may not make much urine with dialysis. Asked how much fluid was taken off during dialysis yesterday. No chest pain or dyspnea. AV fistula hurts but overall, pain is controlled.     Review of Systems  Gen- No fevers, chills  CV- No chest pain, palpitations  Resp-  No cough, dyspnea  GI- No N/V/D, abd pain    Otherwise ROS is negative except as mentioned in the HPI.    Vital Signs:   Temp:  [97.5 °F (36.4 °C)-98.5 °F (36.9 °C)] 98.5 °F (36.9 °C)  Heart Rate:  [65-80] 72  Resp:  [18-20] 18  BP: (101-186)/(56-90) 143/58     Physical Exam:  Constitutional: No acute distress, awake, alert. Morbidly obese, chronically-ill appearing.   HENT: NCAT, mucous membranes moist  Respiratory: Clear to auscultation bilaterally, respiratory effort normal   Cardiovascular: RRR, no murmurs, rubs, or gallops, palpable pedal pulses bilaterally  Gastrointestinal: Positive bowel sounds, soft, nontender, nondistended  Musculoskeletal: No bilateral ankle edema  Psychiatric: Appropriate affect, cooperative  Neurologic: Oriented x 3, strength symmetric in all extremities, Cranial Nerves grossly intact to confrontation, speech clear  Skin: No rashes. AV fistula wound is dressed, not removed for exam. No surrounding erythema or induration.     Pertinent  and/or Most Recent Results       Results from last 7 days  Lab Units 10/23/18  0514 10/23/18  0513 10/20/18  0236 10/19/18  0659 10/18/18  0638 10/17/18  0543 10/16/18  1936   WBC 10*3/mm3  --  14.93* 13.95* 12.97*  --   --   --    HEMOGLOBIN g/dL  --  8.7* 8.5* 8.6*  --  9.0* 6.8*   HEMATOCRIT %  --  28.5* 27.0* 27.3*  --  29.0* 22.9*   PLATELETS 10*3/mm3  --  219 197 205  --   --   --    SODIUM mmol/L 137  --  141 142 142  --   --    POTASSIUM mmol/L 4.5  --  3.6 4.0 5.0  --   --    CHLORIDE mmol/L 105  --  110* 111* 113*  --   --    CO2 mmol/L 24.0  --  25.0 22.0 20.0  --   --    BUN mg/dL 36*  --  39* 56* 78*  --   --    CREATININE mg/dL 2.53*  --  2.47* 3.34* 4.50*  --   --    GLUCOSE mg/dL 138*  --  141* 103* 110*  --   --    CALCIUM mg/dL 8.3*  --  8.4* 8.6* 8.7  --   --        Results from last 7 days  Lab Units 10/18/18  0638   BILIRUBIN mg/dL 0.3   ALK PHOS U/L 81   ALT (SGPT) U/L 13   AST (SGOT) U/L 12       Results from last 7 days  Lab Units  10/20/18  0236   TROPONIN I ng/mL 0.019     Brief Urine Lab Results  (Last result in the past 365 days)      Color   Clarity   Blood   Leuk Est   Nitrite   Protein   CREAT   Urine HCG        10/14/18 1340 Yellow Turbid(A) Small (1+)(A) Large (3+)(A) Negative 100 mg/dL (2+)(A)             Microbiology Results Abnormal     Procedure Component Value - Date/Time    Wound Culture - Wound, Back, Upper [[id number]] Collected:  10/21/18 1201    Lab Status:  Preliminary result Specimen:  Wound from Back, Upper Updated:  10/21/18 2211     Gram Stain Result Moderate (3+) WBCs seen      No organisms seen    Blood Culture - Blood, [[id number]]  (Normal) Collected:  10/14/18 1606    Lab Status:  Final result Specimen:  Blood from Arm, Right Updated:  10/19/18 1630     Blood Culture No growth at 5 days    Blood Culture - Blood, [[id number]]  (Normal) Collected:  10/14/18 1606    Lab Status:  Final result Specimen:  Blood from Arm, Left Updated:  10/19/18 1630     Blood Culture No growth at 5 days    Urine Culture - Urine, [[id number]]  (Abnormal)  (Susceptibility) Collected:  10/14/18 1340    Lab Status:  Final result Specimen:  Urine from Urine, Catheter Updated:  10/16/18 1403     Urine Culture >100,000 CFU/mL Klebsiella pneumoniae ESBL (C)     Comment:   Consider infectious disease consult.  Susceptibility results may not correlate to clinical outcomes.       Susceptibility      Klebsiella pneumoniae ESBL     AL     Ciprofloxacin >2 ug/ml Resistant     Ertapenem <=1 ug/ml Susceptible     Gentamicin <=4 ug/ml Susceptible     Levofloxacin >4 ug/ml Resistant     Meropenem <=1 ug/ml Susceptible     Nitrofurantoin 64 ug/ml Intermediate     Piperacillin + Tazobactam <=16 ug/ml Susceptible     Tetracycline >8 ug/ml Resistant     Tobramycin >8 ug/ml Resistant     Trimethoprim + Sulfamethoxazole >2/38 ug/ml Resistant                        Imaging Results (all)     Procedure Component Value Units Date/Time    FL C Arm During Surgery  [[id number]] Collected:  10/18/18 0839     Updated:  10/18/18 0853    Narrative:       EXAMINATION: FL C ARM DURING SURGERY- 10/17/2018     INDICATION: Hemodialysis Catheter Insertion; N39.0-Urinary tract  infection, site not specified; Z86.19-Personal history of other  infectious and parasitic diseases; N17.9-Acute kidney failure,  unspecified; N18.9-Chronic kidney disease, unspecified; D63.8-Anemia in  other chronic diseases classified elsewhere; Z74.09-Other reduced  mobility       TECHNIQUE: Intraoperative fluoroscopy for improved localization and  treatment planning      COMPARISON: Chest x-ray 10/14/2018     FINDINGS: Intraoperative fluoroscopy with total fluoroscopic time usage  21 seconds and 4 representative images saved during right-sided internal  jugular approach tunneled dialysis catheter placement.       Impression:       Intraoperative fluoroscopy utilized during right-sided  tunneled dialysis catheter placement.     D:  10/18/2018  E:  10/18/2018         This report was finalized on 10/18/2018 8:51 AM by Dr. Ashu Romero.       XR Chest 1 View [[id number]] Collected:  10/17/18 1948     Updated:  10/17/18 2141    Narrative:       EXAM:    XR Chest, 1 View     EXAM DATE/TIME:    10/17/2018 7:48 PM     CLINICAL HISTORY:    68 years old, female; Anemia in other chronic diseases classified elsewhere;   Acute kidney failure, unspecified; Chronic kidney disease, unspecified; Urinary   tract infection, site Not specified; Other reduced mobility; Other reduced   mobility; Personal history of other infectious and parasitic diseases; Device   placement; Other: Rij line placement     TECHNIQUE:    XR of the chest, 1 view.     COMPARISON:    CR XR CHEST 2 VW 10/14/2018 2:24 PM. CT Chest 11/04/2017, images requested for   retrieval from archive for comparison, initially off line. Report available.    FINDINGS:    Tubes, catheters and devices:  Right jugular double-lumen catheter placed with   distal tip near SVC-RA  junction. Impression    Lungs:  Small bilateral suprahilar nodular densities or pulmonary vessels seen   on end, present on the left on prior study, but now apparent on right since   10/14/2018.  CT Chest 11/04/2017 no obvious nodules there.  If desired,   followup CT could rule out true nodularity versus prominence of pulmonary   vessels. Mildly increased interstitial markings of lungs may represent mild   interstitial edema.    Pleural space:  Mild biapical pleural thickening/scarring. No significant   pleural effusion. No pneumothorax.    Heart/Mediastinum:  Mild cardiomegaly. No acute mediastinal abnormality   compared to prior study.    Bones/joints:  Degenerative changes of spine. Mild scoliosis.    Other findings:  Larger body habitus obscure some resolution.       Impression:       1. Small bilateral superhilar nodularity versus slightly prominent pulmonary   vessels. See above.   2. Mild bilateral interstitial markings, possibly edema.   3. Mild cardiomegaly.     THIS DOCUMENT HAS BEEN ELECTRONICALLY SIGNED BY MAYO ELDER MD    XR Chest 2 View [[id number]] Collected:  10/14/18 1443     Updated:  10/14/18 1653    Narrative:          EXAMINATION: XR CHEST 2 VW - 10/14/2018     INDICATION: Lethargy.      COMPARISON: None.     FINDINGS: Two-view chest reveals the heart to be enlarged. Underlying  chronic changes seen throughout the lung fields bilaterally. No evidence  of acute parenchymal disease. Pulmonary vascularity is pronounced. No  focal parenchymal opacification present. Degenerative change is seen  within the spine.           Impression:       Low lung volumes with chronic changes seen within the lung  fields, enlargement heart and no evidence of acute parenchymal disease.     DICTATED:   10/14/2018  EDITED/ls :   10/14/2018      This report was finalized on 10/14/2018 4:51 PM by Dr. Rosalie Kline MD.       CT Head Without Contrast [[id number]] Collected:  10/14/18 1441     Updated:  10/14/18  1652    Narrative:       EXAMINATION: CT HEAD WO CONTRAST - 10/12/2018     INDICATION: Lethargy, weakness.     TECHNIQUE: Multiple axial CT imaging is obtained of the head from skull  base to skull vertex without the administration of intravenous contrast.     The radiation dose reduction device was turned on for each scan per the  ALARA (As Low as Reasonably Achievable) protocol.     COMPARISON: None.     FINDINGS: The parenchyma is grossly unremarkable. Some low-density area  is seen in the periventricular and subcortical white matter suggesting  chronic small vessel ischemic change. There is no hemorrhage or  hydrocephalus. No mass, mass effect or midline shift. Bony structures  reveal no evidence of osseous abnormality. There is mucosal thickening  of the left maxillary sinus and ethmoid air cells. Globes and orbits are  intact. The mastoid air cells are patent.       Impression:       No acute intracranial abnormality with chronic changes seen  within the brain.     DICTATED:   10/14/2018  EDITED/ls :   10/14/2018      This report was finalized on 10/14/2018 4:50 PM by Dr. Rosalie Kline MD.           Results for orders placed during the hospital encounter of 10/04/17   Duplex Venous Lower Extremity - Bilateral CAR    Narrative · Normal bilateral lower extremity venous duplex scan.        Results for orders placed during the hospital encounter of 09/02/17   Adult Transthoracic Echo Complete    Narrative · Left ventricular wall thickness is consistent with mild concentric   hypertrophy.  · Left atrial cavity size is mildly dilated.  · Mild mitral valve regurgitation is present  · calcification of the aortic valve  · Mild tricuspid valve regurgitation is present.         Order Current Status    Wound Culture - Wound, Back, Upper Preliminary result        Discharge Details        Discharge Medications      New Medications      Instructions Start Date   heparin (porcine) 1000 UNIT/ML injection   1,900 Units,  Intracatheter, As Needed      hydrocortisone 2.5 % rectal cream  Commonly known as:  ANUSOL-HC   Rectal, 2 Times Daily PRN      oxyCODONE-acetaminophen 5-325 MG per tablet  Commonly known as:  PERCOCET   1 tablet, Oral, Every 6 Hours PRN      phenylephrine-mineral oil-petrolatum 0.25-14-74.9 % ointment hemorrhoidal ointment   1 application, Rectal, 3 Times Daily PRN         Continue These Medications      Instructions Start Date   acetaminophen 325 MG tablet  Commonly known as:  TYLENOL   650 mg, Oral, Every 4 Hours PRN      albuterol 108 (90 Base) MCG/ACT inhaler  Commonly known as:  PROVENTIL HFA;VENTOLIN HFA   2 puffs, Inhalation, Every 4 Hours PRN      amLODIPine 10 MG tablet  Commonly known as:  NORVASC   10 mg, Oral, Daily      atorvastatin 10 MG tablet  Commonly known as:  LIPITOR   10 mg, Oral, Nightly      calcitriol 0.25 MCG capsule  Commonly known as:  ROCALTROL   0.25 mcg, Oral, Daily      carvedilol 25 MG tablet  Commonly known as:  COREG   25 mg, Oral, 2 Times Daily With Meals      castor oil-balsam peru ointment   1 application, Topical, Every 12 Hours Scheduled      hydrALAZINE 100 MG tablet  Commonly known as:  APRESOLINE   100 mg, Oral, 3 Times Daily      isosorbide mononitrate 30 MG 24 hr tablet  Commonly known as:  IMDUR   30 mg, Oral, Daily      loratadine 10 MG tablet  Commonly known as:  CLARITIN   10 mg, Oral, Daily      melatonin 1 MG tablet   3 mg, Oral, Nightly      miconazole 2 % powder  Commonly known as:  MICOTIN   Topical, Every 12 Hours Scheduled      mometasone-formoterol 200-5 MCG/ACT inhaler  Commonly known as:  DULERA 200   2 puffs, Inhalation, 2 Times Daily - RT      MULTIVITAMIN ADULTS PO   1 tablet, Oral, Daily      NITROGLYCERIN SL   0.4 mg, Sublingual, PRN CHEST PAIN       pantoprazole 40 MG EC tablet  Commonly known as:  PROTONIX   40 mg, Oral, Daily      polyethylene glycol packet  Commonly known as:  MIRALAX   17 g, Oral, Daily      promethazine 25 MG tablet  Commonly  known as:  PHENERGAN   25 mg, Oral, Every 6 Hours PRN      risperiDONE 0.25 MG tablet  Commonly known as:  risperDAL   0.125 mg, Oral, Every Night at Bedtime      saccharomyces boulardii 250 MG capsule  Commonly known as:  FLORASTOR   250 mg, Oral, 2 Times Daily      sertraline 50 MG tablet  Commonly known as:  ZOLOFT   50 mg, Oral, Daily      simethicone 80 MG chewable tablet  Commonly known as:  MYLICON   80 mg, Oral, Every 6 Hours PRN      Vitamin D3 93503 units tablet   50,000 Units, Oral, Every 7 Days         Stop These Medications    ferrous sulfate 325 (65 FE) MG tablet     furosemide 40 MG tablet  Commonly known as:  LASIX     glipiZIDE 5 MG ER tablet  Commonly known as:  GLUCOTROL XL     potassium chloride 20 MEQ CR tablet  Commonly known as:  K-DUR,KLOR-CON     traZODone 25 MG half tablet  Commonly known as:  DESYREL     vitamin C 250 MG tablet  Commonly known as:  ASCORBIC ACID          Discharge Disposition:  Skilled Nursing Facility (DC - External)    Discharge Diet:  Regular diet. Cardiac, consistent CHO.  Fluid restriction (1000mL 7a-7p, 500mL 7p-7a) = total 1500mL/24H    Discharge Activity:   Activity Instructions     Activity as Tolerated           Code Status/Level of Support:  Code Status and Medical Interventions:   Ordered at: 10/14/18 1942     Level Of Support Discussed With:    Patient     Code Status:    CPR     Medical Interventions (Level of Support Prior to Arrest):    Full     No future appointments.    Additional Instructions for the Follow-ups that You Need to Schedule     Discharge Follow-up with Specified Provider: Follow up with Dr. Calderón in 3 weeks    As directed      To:  Follow up with Dr. Calderón in 3 weeks             Time Spent on Discharge:  50 minutes    Electronically signed by Mallory Zaragoza PA-C, 10/23/18, 11:10 AM.

## 2018-10-23 NOTE — PLAN OF CARE
Problem: Patient Care Overview  Goal: Plan of Care Review  Outcome: Ongoing (interventions implemented as appropriate)   10/23/18 4131   Coping/Psychosocial   Plan of Care Reviewed With patient   Plan of Care Review   Progress no change   OTHER   Outcome Summary Pt resting well overnight, AV graft placed in left FA yesterday afternoon, per report pt had nerve block and unable to move arm, having numbness and tingling in left arm, AV graft with thrill and bruit noted, +2 left radial pulse, arm warm to touch.

## 2018-10-23 NOTE — PLAN OF CARE
Problem: Patient Care Overview  Goal: Plan of Care Review  Outcome: Ongoing (interventions implemented as appropriate)   10/23/18 1050   Coping/Psychosocial   Plan of Care Reviewed With patient   Plan of Care Review   Progress no change   OTHER   Outcome Summary Pt. with limited participation due to BUE pain from AV fistula and IV placement. Pt. did participated ther. exercise RUE and L limited and BLE. Pt. declined up to chair or ADL task. Plans to SNF vs LTC today.

## 2018-10-23 NOTE — PROGRESS NOTES
Case Management Discharge Note    Final Note: TREVOR faxed dischareg summary to [facility]. Number to call report is [phone] and pt going to [facility]. Pt ambulance scheduled for transportation to [facility] with [facility] today at 1:00pm and PCS form in the chart. Outpatient dialysis and transportation has been arranged to start tomorrow morning at [facility] and wheels will be taking pt. TREVOR also spoke with staff at [facility] and confirmed pt to start tomorrow morning at 7:00am.     Destination - Selection Complete     Service Request Status Selected Specialties Address Phone Number Fax Number    [facility] Selected Skilled Nursing Facility [address], located within [facility] [address] [phone]      Durable Medical Equipment     No service has been selected for the patient.      Dialysis/Infusion - Selection Complete     Service Request Status Selected Specialties Address Phone Number Fax Number    [facility] Selected Dialysis [address], [facility] [address] [phone]      Home Medical Care     No service has been selected for the patient.      Social Care     No service has been selected for the patient.             Final Discharge Disposition Code: 03 - skilled nursing facility (SNF)

## 2018-10-26 LAB
BACTERIA SPEC AEROBE CULT: ABNORMAL
BACTERIA SPEC AEROBE CULT: ABNORMAL
GRAM STN SPEC: ABNORMAL
GRAM STN SPEC: ABNORMAL

## 2019-02-11 ENCOUNTER — APPOINTMENT (OUTPATIENT)
Dept: CT IMAGING | Facility: HOSPITAL | Age: 69
End: 2019-02-11

## 2019-02-11 ENCOUNTER — HOSPITAL ENCOUNTER (INPATIENT)
Facility: HOSPITAL | Age: 69
LOS: 8 days | Discharge: NURSING, REHAB OR LONG-TERM CARE | End: 2019-02-20
Attending: EMERGENCY MEDICINE | Admitting: INTERNAL MEDICINE

## 2019-02-11 ENCOUNTER — APPOINTMENT (OUTPATIENT)
Dept: GENERAL RADIOLOGY | Facility: HOSPITAL | Age: 69
End: 2019-02-11

## 2019-02-11 DIAGNOSIS — A41.9 SEPSIS, DUE TO UNSPECIFIED ORGANISM: Primary | ICD-10-CM

## 2019-02-11 PROBLEM — N39.0 UTI (URINARY TRACT INFECTION): Status: ACTIVE | Noted: 2019-02-11

## 2019-02-11 LAB
ALBUMIN SERPL-MCNC: 3.75 G/DL (ref 3.2–4.8)
ALBUMIN/GLOB SERPL: 1.2 G/DL (ref 1.5–2.5)
ALP SERPL-CCNC: 104 U/L (ref 25–100)
ALT SERPL W P-5'-P-CCNC: 8 U/L (ref 7–40)
ANION GAP SERPL CALCULATED.3IONS-SCNC: 7 MMOL/L (ref 3–11)
AST SERPL-CCNC: 21 U/L (ref 0–33)
BACTERIA UR QL AUTO: ABNORMAL /HPF
BASOPHILS # BLD MANUAL: 0.29 10*3/MM3 (ref 0–0.2)
BASOPHILS NFR BLD AUTO: 1 % (ref 0–1)
BILIRUB SERPL-MCNC: 0.5 MG/DL (ref 0.3–1.2)
BILIRUB UR QL STRIP: NEGATIVE
BNP SERPL-MCNC: 184 PG/ML (ref 0–100)
BUN BLD-MCNC: 28 MG/DL (ref 9–23)
BUN/CREAT SERPL: 13.5 (ref 7–25)
CALCIUM SPEC-SCNC: 8.8 MG/DL (ref 8.7–10.4)
CHLORIDE SERPL-SCNC: 98 MMOL/L (ref 99–109)
CLARITY UR: ABNORMAL
CO2 SERPL-SCNC: 29 MMOL/L (ref 20–31)
COLOR UR: YELLOW
CREAT BLD-MCNC: 2.07 MG/DL (ref 0.6–1.3)
D-LACTATE SERPL-SCNC: 1.4 MMOL/L (ref 0.5–2)
DEPRECATED RDW RBC AUTO: 63.6 FL (ref 37–54)
EOSINOPHIL # BLD MANUAL: 0 10*3/MM3 (ref 0.1–0.3)
EOSINOPHIL NFR BLD MANUAL: 0 % (ref 0–3)
ERYTHROCYTE [DISTWIDTH] IN BLOOD BY AUTOMATED COUNT: 17.8 % (ref 11.3–14.5)
GFR SERPL CREATININE-BSD FRML MDRD: 29 ML/MIN/1.73
GLOBULIN UR ELPH-MCNC: 3.2 GM/DL
GLUCOSE BLD-MCNC: 156 MG/DL (ref 70–100)
GLUCOSE BLDC GLUCOMTR-MCNC: 194 MG/DL (ref 70–130)
GLUCOSE UR STRIP-MCNC: NEGATIVE MG/DL
GRAN CASTS URNS QL MICRO: ABNORMAL /LPF
HBA1C MFR BLD: 6 % (ref 4.8–5.6)
HCT VFR BLD AUTO: 34.7 % (ref 34.5–44)
HGB BLD-MCNC: 10.3 G/DL (ref 11.5–15.5)
HGB UR QL STRIP.AUTO: ABNORMAL
HOLD SPECIMEN: NORMAL
HOLD SPECIMEN: NORMAL
HYALINE CASTS UR QL AUTO: ABNORMAL /LPF
INR PPP: 1.06 (ref 0.85–1.16)
KETONES UR QL STRIP: NEGATIVE
LEUKOCYTE ESTERASE UR QL STRIP.AUTO: ABNORMAL
LIPASE SERPL-CCNC: 43 U/L (ref 6–51)
LYMPHOCYTES # BLD MANUAL: 2.86 10*3/MM3 (ref 0.6–4.8)
LYMPHOCYTES NFR BLD MANUAL: 10 % (ref 0–12)
LYMPHOCYTES NFR BLD MANUAL: 10 % (ref 24–44)
MAGNESIUM SERPL-MCNC: 1.5 MG/DL (ref 1.3–2.7)
MAGNESIUM SERPL-MCNC: 1.6 MG/DL (ref 1.3–2.7)
MCH RBC QN AUTO: 29.3 PG (ref 27–31)
MCHC RBC AUTO-ENTMCNC: 29.7 G/DL (ref 32–36)
MCV RBC AUTO: 98.6 FL (ref 80–99)
METAMYELOCYTES NFR BLD MANUAL: 3 % (ref 0–0)
MONOCYTES # BLD AUTO: 2.86 10*3/MM3 (ref 0–1)
NEUTROPHILS # BLD AUTO: 21.77 10*3/MM3 (ref 1.5–8.3)
NEUTROPHILS NFR BLD MANUAL: 52 % (ref 41–71)
NEUTS BAND NFR BLD MANUAL: 24 % (ref 0–5)
NITRITE UR QL STRIP: NEGATIVE
PH UR STRIP.AUTO: 5.5 [PH] (ref 5–8)
PLAT MORPH BLD: NORMAL
PLATELET # BLD AUTO: 348 10*3/MM3 (ref 150–450)
PMV BLD AUTO: 10.3 FL (ref 6–12)
POLYCHROMASIA BLD QL SMEAR: ABNORMAL
POTASSIUM BLD-SCNC: 3.8 MMOL/L (ref 3.5–5.5)
PROCALCITONIN SERPL-MCNC: 0.5 NG/ML
PROT SERPL-MCNC: 6.9 G/DL (ref 5.7–8.2)
PROT UR QL STRIP: ABNORMAL
PROTHROMBIN TIME: 13.3 SECONDS (ref 11.2–14.3)
RBC # BLD AUTO: 3.52 10*6/MM3 (ref 3.89–5.14)
RBC # UR: ABNORMAL /HPF
REF LAB TEST METHOD: ABNORMAL
ROULEAUX BLD QL SMEAR: ABNORMAL
SODIUM BLD-SCNC: 134 MMOL/L (ref 132–146)
SP GR UR STRIP: 1.02 (ref 1–1.03)
SQUAMOUS #/AREA URNS HPF: ABNORMAL /HPF
TROPONIN I SERPL-MCNC: 0 NG/ML (ref 0–0.07)
TROPONIN I SERPL-MCNC: 0 NG/ML (ref 0–0.07)
TROPONIN I SERPL-MCNC: <0.006 NG/ML
TSH SERPL DL<=0.05 MIU/L-ACNC: 4.95 MIU/ML (ref 0.35–5.35)
URATE CRY URNS QL MICRO: ABNORMAL /HPF
UROBILINOGEN UR QL STRIP: ABNORMAL
WBC MORPH BLD: NORMAL
WBC NRBC COR # BLD: 28.64 10*3/MM3 (ref 3.5–10.8)
WBC UR QL AUTO: ABNORMAL /HPF
WHOLE BLOOD HOLD SPECIMEN: NORMAL
WHOLE BLOOD HOLD SPECIMEN: NORMAL

## 2019-02-11 PROCEDURE — 83690 ASSAY OF LIPASE: CPT | Performed by: EMERGENCY MEDICINE

## 2019-02-11 PROCEDURE — 84484 ASSAY OF TROPONIN QUANT: CPT | Performed by: INTERNAL MEDICINE

## 2019-02-11 PROCEDURE — 71045 X-RAY EXAM CHEST 1 VIEW: CPT

## 2019-02-11 PROCEDURE — G0378 HOSPITAL OBSERVATION PER HR: HCPCS

## 2019-02-11 PROCEDURE — 83880 ASSAY OF NATRIURETIC PEPTIDE: CPT | Performed by: EMERGENCY MEDICINE

## 2019-02-11 PROCEDURE — 85610 PROTHROMBIN TIME: CPT | Performed by: INTERNAL MEDICINE

## 2019-02-11 PROCEDURE — 83735 ASSAY OF MAGNESIUM: CPT | Performed by: EMERGENCY MEDICINE

## 2019-02-11 PROCEDURE — 93005 ELECTROCARDIOGRAM TRACING: CPT | Performed by: EMERGENCY MEDICINE

## 2019-02-11 PROCEDURE — 84443 ASSAY THYROID STIM HORMONE: CPT | Performed by: NURSE PRACTITIONER

## 2019-02-11 PROCEDURE — 85007 BL SMEAR W/DIFF WBC COUNT: CPT | Performed by: EMERGENCY MEDICINE

## 2019-02-11 PROCEDURE — 80053 COMPREHEN METABOLIC PANEL: CPT | Performed by: EMERGENCY MEDICINE

## 2019-02-11 PROCEDURE — 87086 URINE CULTURE/COLONY COUNT: CPT | Performed by: INTERNAL MEDICINE

## 2019-02-11 PROCEDURE — 99220 PR INITIAL OBSERVATION CARE/DAY 70 MINUTES: CPT | Performed by: INTERNAL MEDICINE

## 2019-02-11 PROCEDURE — 84145 PROCALCITONIN (PCT): CPT | Performed by: EMERGENCY MEDICINE

## 2019-02-11 PROCEDURE — 94640 AIRWAY INHALATION TREATMENT: CPT

## 2019-02-11 PROCEDURE — 87077 CULTURE AEROBIC IDENTIFY: CPT | Performed by: INTERNAL MEDICINE

## 2019-02-11 PROCEDURE — 99285 EMERGENCY DEPT VISIT HI MDM: CPT

## 2019-02-11 PROCEDURE — 85520 HEPARIN ASSAY: CPT

## 2019-02-11 PROCEDURE — 85025 COMPLETE CBC W/AUTO DIFF WBC: CPT

## 2019-02-11 PROCEDURE — 74176 CT ABD & PELVIS W/O CONTRAST: CPT

## 2019-02-11 PROCEDURE — 84484 ASSAY OF TROPONIN QUANT: CPT

## 2019-02-11 PROCEDURE — 81001 URINALYSIS AUTO W/SCOPE: CPT | Performed by: EMERGENCY MEDICINE

## 2019-02-11 PROCEDURE — 83036 HEMOGLOBIN GLYCOSYLATED A1C: CPT | Performed by: NURSE PRACTITIONER

## 2019-02-11 PROCEDURE — 83605 ASSAY OF LACTIC ACID: CPT | Performed by: EMERGENCY MEDICINE

## 2019-02-11 PROCEDURE — 87040 BLOOD CULTURE FOR BACTERIA: CPT | Performed by: EMERGENCY MEDICINE

## 2019-02-11 PROCEDURE — 82962 GLUCOSE BLOOD TEST: CPT

## 2019-02-11 PROCEDURE — 83735 ASSAY OF MAGNESIUM: CPT | Performed by: NURSE PRACTITIONER

## 2019-02-11 PROCEDURE — 87186 SC STD MICRODIL/AGAR DIL: CPT | Performed by: INTERNAL MEDICINE

## 2019-02-11 PROCEDURE — 85730 THROMBOPLASTIN TIME PARTIAL: CPT

## 2019-02-11 PROCEDURE — 25010000002 ERTAPENEM PER 500 MG: Performed by: EMERGENCY MEDICINE

## 2019-02-11 PROCEDURE — 25010000002 HEPARIN (PORCINE) PER 1000 UNITS: Performed by: NURSE PRACTITIONER

## 2019-02-11 PROCEDURE — 85025 COMPLETE CBC W/AUTO DIFF WBC: CPT | Performed by: EMERGENCY MEDICINE

## 2019-02-11 RX ORDER — ISOSORBIDE MONONITRATE 30 MG/1
30 TABLET, EXTENDED RELEASE ORAL DAILY
Status: DISCONTINUED | OUTPATIENT
Start: 2019-02-11 | End: 2019-02-20 | Stop reason: HOSPADM

## 2019-02-11 RX ORDER — ACETAMINOPHEN 650 MG/1
650 SUPPOSITORY RECTAL EVERY 4 HOURS PRN
Status: DISCONTINUED | OUTPATIENT
Start: 2019-02-11 | End: 2019-02-20 | Stop reason: HOSPADM

## 2019-02-11 RX ORDER — RISPERIDONE 0.25 MG/1
0.25 TABLET ORAL NIGHTLY PRN
COMMUNITY
End: 2019-04-05

## 2019-02-11 RX ORDER — SEVELAMER HYDROCHLORIDE 800 MG/1
800 TABLET, FILM COATED ORAL
Status: DISCONTINUED | OUTPATIENT
Start: 2019-02-12 | End: 2019-02-20 | Stop reason: HOSPADM

## 2019-02-11 RX ORDER — NICOTINE POLACRILEX 4 MG
15 LOZENGE BUCCAL
Status: DISCONTINUED | OUTPATIENT
Start: 2019-02-11 | End: 2019-02-20 | Stop reason: HOSPADM

## 2019-02-11 RX ORDER — BISACODYL 10 MG
10 SUPPOSITORY, RECTAL RECTAL DAILY PRN
Status: DISCONTINUED | OUTPATIENT
Start: 2019-02-11 | End: 2019-02-20 | Stop reason: HOSPADM

## 2019-02-11 RX ORDER — ATORVASTATIN CALCIUM 10 MG/1
10 TABLET, FILM COATED ORAL NIGHTLY
Status: DISCONTINUED | OUTPATIENT
Start: 2019-02-11 | End: 2019-02-20 | Stop reason: HOSPADM

## 2019-02-11 RX ORDER — MULTIPLE VITAMINS W/ MINERALS TAB 9MG-400MCG
1 TAB ORAL DAILY
Status: DISCONTINUED | OUTPATIENT
Start: 2019-02-11 | End: 2019-02-20 | Stop reason: HOSPADM

## 2019-02-11 RX ORDER — SODIUM CHLORIDE 0.9 % (FLUSH) 0.9 %
10 SYRINGE (ML) INJECTION AS NEEDED
Status: DISCONTINUED | OUTPATIENT
Start: 2019-02-11 | End: 2019-02-11

## 2019-02-11 RX ORDER — CALCIUM CARBONATE 200(500)MG
2 TABLET,CHEWABLE ORAL 2 TIMES DAILY PRN
Status: DISCONTINUED | OUTPATIENT
Start: 2019-02-11 | End: 2019-02-20 | Stop reason: HOSPADM

## 2019-02-11 RX ORDER — WARFARIN SODIUM 2 MG/1
2 TABLET ORAL EVERY EVENING
COMMUNITY
End: 2019-02-20 | Stop reason: HOSPADM

## 2019-02-11 RX ORDER — SENNA AND DOCUSATE SODIUM 50; 8.6 MG/1; MG/1
2 TABLET, FILM COATED ORAL 2 TIMES DAILY
Status: DISCONTINUED | OUTPATIENT
Start: 2019-02-11 | End: 2019-02-18

## 2019-02-11 RX ORDER — ONDANSETRON 2 MG/ML
4 INJECTION INTRAMUSCULAR; INTRAVENOUS EVERY 6 HOURS PRN
Status: DISCONTINUED | OUTPATIENT
Start: 2019-02-11 | End: 2019-02-20 | Stop reason: HOSPADM

## 2019-02-11 RX ORDER — SODIUM CHLORIDE 0.9 % (FLUSH) 0.9 %
3-10 SYRINGE (ML) INJECTION AS NEEDED
Status: DISCONTINUED | OUTPATIENT
Start: 2019-02-11 | End: 2019-02-20 | Stop reason: HOSPADM

## 2019-02-11 RX ORDER — DEXTROSE MONOHYDRATE 25 G/50ML
25 INJECTION, SOLUTION INTRAVENOUS
Status: DISCONTINUED | OUTPATIENT
Start: 2019-02-11 | End: 2019-02-20 | Stop reason: HOSPADM

## 2019-02-11 RX ORDER — SEVELAMER CARBONATE 800 MG/1
800 TABLET, FILM COATED ORAL
COMMUNITY
End: 2021-02-17 | Stop reason: HOSPADM

## 2019-02-11 RX ORDER — AMLODIPINE BESYLATE 10 MG/1
10 TABLET ORAL DAILY
Status: DISCONTINUED | OUTPATIENT
Start: 2019-02-11 | End: 2019-02-20 | Stop reason: HOSPADM

## 2019-02-11 RX ORDER — HYDROCORTISONE ACETATE 25 MG/1
25 SUPPOSITORY RECTAL 2 TIMES DAILY
Status: DISCONTINUED | OUTPATIENT
Start: 2019-02-11 | End: 2019-02-16

## 2019-02-11 RX ORDER — BUDESONIDE AND FORMOTEROL FUMARATE DIHYDRATE 160; 4.5 UG/1; UG/1
2 AEROSOL RESPIRATORY (INHALATION)
Status: DISCONTINUED | OUTPATIENT
Start: 2019-02-11 | End: 2019-02-20 | Stop reason: HOSPADM

## 2019-02-11 RX ORDER — SODIUM CHLORIDE 0.9 % (FLUSH) 0.9 %
3 SYRINGE (ML) INJECTION EVERY 12 HOURS SCHEDULED
Status: DISCONTINUED | OUTPATIENT
Start: 2019-02-11 | End: 2019-02-20 | Stop reason: HOSPADM

## 2019-02-11 RX ORDER — ONDANSETRON 4 MG/1
4 TABLET, FILM COATED ORAL EVERY 6 HOURS PRN
Status: DISCONTINUED | OUTPATIENT
Start: 2019-02-11 | End: 2019-02-20 | Stop reason: HOSPADM

## 2019-02-11 RX ORDER — HYDRALAZINE HYDROCHLORIDE 50 MG/1
100 TABLET, FILM COATED ORAL EVERY 8 HOURS SCHEDULED
Status: DISCONTINUED | OUTPATIENT
Start: 2019-02-11 | End: 2019-02-20 | Stop reason: HOSPADM

## 2019-02-11 RX ORDER — CETIRIZINE HYDROCHLORIDE 10 MG/1
10 TABLET ORAL DAILY
COMMUNITY
End: 2019-04-30 | Stop reason: HOSPADM

## 2019-02-11 RX ORDER — CALCITRIOL 0.25 UG/1
0.25 CAPSULE, LIQUID FILLED ORAL DAILY
Status: DISCONTINUED | OUTPATIENT
Start: 2019-02-11 | End: 2019-02-20 | Stop reason: HOSPADM

## 2019-02-11 RX ORDER — CARVEDILOL 12.5 MG/1
25 TABLET ORAL 2 TIMES DAILY WITH MEALS
Status: DISCONTINUED | OUTPATIENT
Start: 2019-02-11 | End: 2019-02-20 | Stop reason: HOSPADM

## 2019-02-11 RX ORDER — HEPARIN SODIUM 5000 [USP'U]/ML
5000 INJECTION, SOLUTION INTRAVENOUS; SUBCUTANEOUS EVERY 12 HOURS SCHEDULED
Status: DISCONTINUED | OUTPATIENT
Start: 2019-02-11 | End: 2019-02-11 | Stop reason: ALTCHOICE

## 2019-02-11 RX ORDER — PANTOPRAZOLE SODIUM 40 MG/1
40 TABLET, DELAYED RELEASE ORAL
Status: DISCONTINUED | OUTPATIENT
Start: 2019-02-12 | End: 2019-02-20 | Stop reason: HOSPADM

## 2019-02-11 RX ORDER — CETIRIZINE HYDROCHLORIDE 10 MG/1
10 TABLET ORAL DAILY
Status: DISCONTINUED | OUTPATIENT
Start: 2019-02-11 | End: 2019-02-20 | Stop reason: HOSPADM

## 2019-02-11 RX ORDER — SIMETHICONE 80 MG
80 TABLET,CHEWABLE ORAL EVERY 6 HOURS PRN
Status: DISCONTINUED | OUTPATIENT
Start: 2019-02-11 | End: 2019-02-20 | Stop reason: HOSPADM

## 2019-02-11 RX ORDER — ACETAMINOPHEN 325 MG/1
650 TABLET ORAL EVERY 4 HOURS PRN
Status: DISCONTINUED | OUTPATIENT
Start: 2019-02-11 | End: 2019-02-20 | Stop reason: HOSPADM

## 2019-02-11 RX ADMIN — ATORVASTATIN CALCIUM 10 MG: 10 TABLET, FILM COATED ORAL at 22:48

## 2019-02-11 RX ADMIN — BUDESONIDE AND FORMOTEROL FUMARATE DIHYDRATE 2 PUFF: 160; 4.5 AEROSOL RESPIRATORY (INHALATION) at 22:40

## 2019-02-11 RX ADMIN — HEPARIN SODIUM 5000 UNITS: 5000 INJECTION, SOLUTION INTRAVENOUS; SUBCUTANEOUS at 22:48

## 2019-02-11 RX ADMIN — HYDRALAZINE HYDROCHLORIDE 100 MG: 50 TABLET, FILM COATED ORAL at 22:48

## 2019-02-11 RX ADMIN — ERTAPENEM SODIUM 1 G: 1 INJECTION, POWDER, LYOPHILIZED, FOR SOLUTION INTRAMUSCULAR; INTRAVENOUS at 16:37

## 2019-02-12 LAB
ANION GAP SERPL CALCULATED.3IONS-SCNC: 8 MMOL/L (ref 3–11)
APTT PPP: 34 SECONDS (ref 85–120)
BASOPHILS # BLD AUTO: 0.03 10*3/MM3 (ref 0–0.2)
BASOPHILS # BLD AUTO: 0.03 10*3/MM3 (ref 0–0.2)
BASOPHILS NFR BLD AUTO: 0.2 % (ref 0–1)
BASOPHILS NFR BLD AUTO: 0.2 % (ref 0–1)
BUN BLD-MCNC: 38 MG/DL (ref 9–23)
BUN/CREAT SERPL: 13.2 (ref 7–25)
CALCIUM SPEC-SCNC: 8.4 MG/DL (ref 8.7–10.4)
CHLORIDE SERPL-SCNC: 100 MMOL/L (ref 99–109)
CO2 SERPL-SCNC: 27 MMOL/L (ref 20–31)
CREAT BLD-MCNC: 2.87 MG/DL (ref 0.6–1.3)
DEPRECATED RDW RBC AUTO: 65.4 FL (ref 37–54)
DEPRECATED RDW RBC AUTO: 66.7 FL (ref 37–54)
EOSINOPHIL # BLD AUTO: 0.29 10*3/MM3 (ref 0–0.3)
EOSINOPHIL # BLD AUTO: 0.31 10*3/MM3 (ref 0–0.3)
EOSINOPHIL NFR BLD AUTO: 1.5 % (ref 0–3)
EOSINOPHIL NFR BLD AUTO: 1.8 % (ref 0–3)
ERYTHROCYTE [DISTWIDTH] IN BLOOD BY AUTOMATED COUNT: 18.1 % (ref 11.3–14.5)
ERYTHROCYTE [DISTWIDTH] IN BLOOD BY AUTOMATED COUNT: 18.4 % (ref 11.3–14.5)
GFR SERPL CREATININE-BSD FRML MDRD: 20 ML/MIN/1.73
GLUCOSE BLD-MCNC: 211 MG/DL (ref 70–100)
GLUCOSE BLDC GLUCOMTR-MCNC: 196 MG/DL (ref 70–130)
GLUCOSE BLDC GLUCOMTR-MCNC: 197 MG/DL (ref 70–130)
GLUCOSE BLDC GLUCOMTR-MCNC: 210 MG/DL (ref 70–130)
GLUCOSE BLDC GLUCOMTR-MCNC: 219 MG/DL (ref 70–130)
GLUCOSE BLDC GLUCOMTR-MCNC: 233 MG/DL (ref 70–130)
HCT VFR BLD AUTO: 31.8 % (ref 34.5–44)
HCT VFR BLD AUTO: 33.5 % (ref 34.5–44)
HGB BLD-MCNC: 9.2 G/DL (ref 11.5–15.5)
HGB BLD-MCNC: 9.7 G/DL (ref 11.5–15.5)
IMM GRANULOCYTES # BLD AUTO: 0.15 10*3/MM3 (ref 0–0.05)
IMM GRANULOCYTES # BLD AUTO: 0.19 10*3/MM3 (ref 0–0.05)
IMM GRANULOCYTES NFR BLD AUTO: 0.9 % (ref 0–0.6)
IMM GRANULOCYTES NFR BLD AUTO: 1 % (ref 0–0.6)
INR PPP: 1.09 (ref 0.85–1.16)
LYMPHOCYTES # BLD AUTO: 2.19 10*3/MM3 (ref 0.6–4.8)
LYMPHOCYTES # BLD AUTO: 2.37 10*3/MM3 (ref 0.6–4.8)
LYMPHOCYTES NFR BLD AUTO: 11 % (ref 24–44)
LYMPHOCYTES NFR BLD AUTO: 14.1 % (ref 24–44)
MCH RBC QN AUTO: 29.1 PG (ref 27–31)
MCH RBC QN AUTO: 29.3 PG (ref 27–31)
MCHC RBC AUTO-ENTMCNC: 28.9 G/DL (ref 32–36)
MCHC RBC AUTO-ENTMCNC: 29 G/DL (ref 32–36)
MCV RBC AUTO: 100.6 FL (ref 80–99)
MCV RBC AUTO: 101.2 FL (ref 80–99)
MONOCYTES # BLD AUTO: 0.66 10*3/MM3 (ref 0–1)
MONOCYTES # BLD AUTO: 0.84 10*3/MM3 (ref 0–1)
MONOCYTES NFR BLD AUTO: 3.9 % (ref 0–12)
MONOCYTES NFR BLD AUTO: 4.2 % (ref 0–12)
NEUTROPHILS # BLD AUTO: 13.44 10*3/MM3 (ref 1.5–8.3)
NEUTROPHILS # BLD AUTO: 16.65 10*3/MM3 (ref 1.5–8.3)
NEUTROPHILS NFR BLD AUTO: 80 % (ref 41–71)
NEUTROPHILS NFR BLD AUTO: 83.1 % (ref 41–71)
PLATELET # BLD AUTO: 333 10*3/MM3 (ref 150–450)
PLATELET # BLD AUTO: 342 10*3/MM3 (ref 150–450)
PMV BLD AUTO: 10.3 FL (ref 6–12)
PMV BLD AUTO: 10.5 FL (ref 6–12)
POTASSIUM BLD-SCNC: 3.9 MMOL/L (ref 3.5–5.5)
PROTHROMBIN TIME: 13.6 SECONDS (ref 11.2–14.3)
RBC # BLD AUTO: 3.16 10*6/MM3 (ref 3.89–5.14)
RBC # BLD AUTO: 3.31 10*6/MM3 (ref 3.89–5.14)
SODIUM BLD-SCNC: 135 MMOL/L (ref 132–146)
TROPONIN I SERPL-MCNC: <0.006 NG/ML
UFH PPP CHRO-ACNC: 0.1 IU/ML (ref 0.3–0.7)
UFH PPP CHRO-ACNC: 0.35 IU/ML (ref 0.3–0.7)
UFH PPP CHRO-ACNC: 0.46 IU/ML (ref 0.3–0.7)
WBC NRBC COR # BLD: 16.81 10*3/MM3 (ref 3.5–10.8)
WBC NRBC COR # BLD: 20 10*3/MM3 (ref 3.5–10.8)

## 2019-02-12 PROCEDURE — 25010000002 ERTAPENEM PER 500 MG: Performed by: INTERNAL MEDICINE

## 2019-02-12 PROCEDURE — 80048 BASIC METABOLIC PNL TOTAL CA: CPT | Performed by: NURSE PRACTITIONER

## 2019-02-12 PROCEDURE — 84484 ASSAY OF TROPONIN QUANT: CPT | Performed by: INTERNAL MEDICINE

## 2019-02-12 PROCEDURE — 99226 PR SBSQ OBSERVATION CARE/DAY 35 MINUTES: CPT | Performed by: INTERNAL MEDICINE

## 2019-02-12 PROCEDURE — 94799 UNLISTED PULMONARY SVC/PX: CPT

## 2019-02-12 PROCEDURE — 25010000002 HEPARIN (PORCINE) IN NACL 25000-0.45 UT/250ML-% SOLUTION

## 2019-02-12 PROCEDURE — 82962 GLUCOSE BLOOD TEST: CPT

## 2019-02-12 PROCEDURE — 85520 HEPARIN ASSAY: CPT

## 2019-02-12 PROCEDURE — G0378 HOSPITAL OBSERVATION PER HR: HCPCS

## 2019-02-12 PROCEDURE — 85610 PROTHROMBIN TIME: CPT | Performed by: INTERNAL MEDICINE

## 2019-02-12 PROCEDURE — 63710000001 INSULIN LISPRO (HUMAN) PER 5 UNITS: Performed by: NURSE PRACTITIONER

## 2019-02-12 PROCEDURE — 85025 COMPLETE CBC W/AUTO DIFF WBC: CPT | Performed by: NURSE PRACTITIONER

## 2019-02-12 RX ORDER — WARFARIN SODIUM 4 MG/1
4 TABLET ORAL
Status: DISCONTINUED | OUTPATIENT
Start: 2019-02-12 | End: 2019-02-13

## 2019-02-12 RX ORDER — WARFARIN SODIUM 5 MG/1
5 TABLET ORAL
Status: COMPLETED | OUTPATIENT
Start: 2019-02-12 | End: 2019-02-12

## 2019-02-12 RX ORDER — WARFARIN SODIUM 5 MG/1
5 TABLET ORAL
Status: DISCONTINUED | OUTPATIENT
Start: 2019-02-12 | End: 2019-02-12

## 2019-02-12 RX ORDER — WARFARIN SODIUM 4 MG/1
4 TABLET ORAL
Status: DISCONTINUED | OUTPATIENT
Start: 2019-02-12 | End: 2019-02-12

## 2019-02-12 RX ADMIN — CARVEDILOL 25 MG: 12.5 TABLET, FILM COATED ORAL at 08:40

## 2019-02-12 RX ADMIN — INSULIN LISPRO 3 UNITS: 100 INJECTION, SOLUTION INTRAVENOUS; SUBCUTANEOUS at 17:04

## 2019-02-12 RX ADMIN — SODIUM CHLORIDE, PRESERVATIVE FREE 3 ML: 5 INJECTION INTRAVENOUS at 08:40

## 2019-02-12 RX ADMIN — HYDRALAZINE HYDROCHLORIDE 100 MG: 50 TABLET, FILM COATED ORAL at 06:22

## 2019-02-12 RX ADMIN — HYDROCORTISONE ACETATE 25 MG: 25 SUPPOSITORY RECTAL at 20:52

## 2019-02-12 RX ADMIN — ACETAMINOPHEN 650 MG: 325 TABLET ORAL at 20:50

## 2019-02-12 RX ADMIN — BUDESONIDE AND FORMOTEROL FUMARATE DIHYDRATE 2 PUFF: 160; 4.5 AEROSOL RESPIRATORY (INHALATION) at 10:13

## 2019-02-12 RX ADMIN — HEPARIN SODIUM 11.3 UNITS/KG/HR: 10000 INJECTION, SOLUTION INTRAVENOUS at 17:04

## 2019-02-12 RX ADMIN — HYDROCORTISONE ACETATE 25 MG: 25 SUPPOSITORY RECTAL at 00:20

## 2019-02-12 RX ADMIN — WARFARIN SODIUM 4 MG: 4 TABLET ORAL at 17:04

## 2019-02-12 RX ADMIN — SENNOSIDES AND DOCUSATE SODIUM 2 TABLET: 8.6; 5 TABLET ORAL at 08:40

## 2019-02-12 RX ADMIN — ISOSORBIDE MONONITRATE 30 MG: 30 TABLET, EXTENDED RELEASE ORAL at 08:40

## 2019-02-12 RX ADMIN — ATORVASTATIN CALCIUM 10 MG: 10 TABLET, FILM COATED ORAL at 20:50

## 2019-02-12 RX ADMIN — CETIRIZINE HYDROCHLORIDE 10 MG: 10 TABLET, FILM COATED ORAL at 08:40

## 2019-02-12 RX ADMIN — SERTRALINE HYDROCHLORIDE 50 MG: 50 TABLET ORAL at 08:40

## 2019-02-12 RX ADMIN — INSULIN LISPRO 2 UNITS: 100 INJECTION, SOLUTION INTRAVENOUS; SUBCUTANEOUS at 00:49

## 2019-02-12 RX ADMIN — CARVEDILOL 25 MG: 12.5 TABLET, FILM COATED ORAL at 20:50

## 2019-02-12 RX ADMIN — SODIUM CHLORIDE, PRESERVATIVE FREE 3 ML: 5 INJECTION INTRAVENOUS at 20:53

## 2019-02-12 RX ADMIN — MULTIPLE VITAMINS W/ MINERALS TAB 1 TABLET: TAB ORAL at 08:40

## 2019-02-12 RX ADMIN — WARFARIN SODIUM 5 MG: 5 TABLET ORAL at 01:04

## 2019-02-12 RX ADMIN — RENAGEL 800 MG: 800 TABLET ORAL at 17:58

## 2019-02-12 RX ADMIN — AMLODIPINE BESYLATE 10 MG: 10 TABLET ORAL at 08:40

## 2019-02-12 RX ADMIN — HYDROCORTISONE ACETATE 25 MG: 25 SUPPOSITORY RECTAL at 08:39

## 2019-02-12 RX ADMIN — HYDRALAZINE HYDROCHLORIDE 100 MG: 50 TABLET, FILM COATED ORAL at 20:50

## 2019-02-12 RX ADMIN — BUDESONIDE AND FORMOTEROL FUMARATE DIHYDRATE 2 PUFF: 160; 4.5 AEROSOL RESPIRATORY (INHALATION) at 21:58

## 2019-02-12 RX ADMIN — PANTOPRAZOLE SODIUM 40 MG: 40 TABLET, DELAYED RELEASE ORAL at 06:23

## 2019-02-12 RX ADMIN — ERTAPENEM SODIUM 500 MG: 1 INJECTION, POWDER, LYOPHILIZED, FOR SOLUTION INTRAMUSCULAR; INTRAVENOUS at 17:05

## 2019-02-12 RX ADMIN — INSULIN LISPRO 2 UNITS: 100 INJECTION, SOLUTION INTRAVENOUS; SUBCUTANEOUS at 20:52

## 2019-02-12 RX ADMIN — INSULIN LISPRO 3 UNITS: 100 INJECTION, SOLUTION INTRAVENOUS; SUBCUTANEOUS at 11:34

## 2019-02-12 RX ADMIN — CALCITRIOL 0.25 MCG: 0.25 CAPSULE ORAL at 08:40

## 2019-02-12 RX ADMIN — HEPARIN SODIUM 11.3 UNITS/KG/HR: 10000 INJECTION, SOLUTION INTRAVENOUS at 00:42

## 2019-02-12 RX ADMIN — RENAGEL 800 MG: 800 TABLET ORAL at 11:35

## 2019-02-12 RX ADMIN — HYDRALAZINE HYDROCHLORIDE 100 MG: 50 TABLET, FILM COATED ORAL at 16:07

## 2019-02-12 RX ADMIN — INSULIN LISPRO 3 UNITS: 100 INJECTION, SOLUTION INTRAVENOUS; SUBCUTANEOUS at 08:39

## 2019-02-12 RX ADMIN — SENNOSIDES AND DOCUSATE SODIUM 2 TABLET: 8.6; 5 TABLET ORAL at 20:50

## 2019-02-13 ENCOUNTER — APPOINTMENT (OUTPATIENT)
Dept: GENERAL RADIOLOGY | Facility: HOSPITAL | Age: 69
End: 2019-02-13

## 2019-02-13 ENCOUNTER — APPOINTMENT (OUTPATIENT)
Dept: NEPHROLOGY | Facility: HOSPITAL | Age: 69
End: 2019-02-13

## 2019-02-13 PROBLEM — A41.9 SEPSIS (HCC): Status: ACTIVE | Noted: 2019-02-13

## 2019-02-13 LAB
ANION GAP SERPL CALCULATED.3IONS-SCNC: 8 MMOL/L (ref 3–11)
BACTERIA SPEC AEROBE CULT: ABNORMAL
BUN BLD-MCNC: 51 MG/DL (ref 9–23)
BUN/CREAT SERPL: 13.1 (ref 7–25)
CALCIUM SPEC-SCNC: 8.6 MG/DL (ref 8.7–10.4)
CHLORIDE SERPL-SCNC: 104 MMOL/L (ref 99–109)
CO2 SERPL-SCNC: 25 MMOL/L (ref 20–31)
CREAT BLD-MCNC: 3.9 MG/DL (ref 0.6–1.3)
DEPRECATED RDW RBC AUTO: 68.3 FL (ref 37–54)
ERYTHROCYTE [DISTWIDTH] IN BLOOD BY AUTOMATED COUNT: 18.7 % (ref 11.3–14.5)
FERRITIN SERPL-MCNC: 721 NG/ML (ref 10–291)
GFR SERPL CREATININE-BSD FRML MDRD: 14 ML/MIN/1.73
GLUCOSE BLD-MCNC: 191 MG/DL (ref 70–100)
GLUCOSE BLDC GLUCOMTR-MCNC: 147 MG/DL (ref 70–130)
GLUCOSE BLDC GLUCOMTR-MCNC: 169 MG/DL (ref 70–130)
GLUCOSE BLDC GLUCOMTR-MCNC: 229 MG/DL (ref 70–130)
GLUCOSE BLDC GLUCOMTR-MCNC: 239 MG/DL (ref 70–130)
HCT VFR BLD AUTO: 32.9 % (ref 34.5–44)
HGB BLD-MCNC: 9.5 G/DL (ref 11.5–15.5)
INR PPP: 1.07 (ref 0.85–1.16)
IRON 24H UR-MRATE: 64 MCG/DL (ref 50–175)
IRON SATN MFR SERPL: 36 % (ref 15–50)
MCH RBC QN AUTO: 29.4 PG (ref 27–31)
MCHC RBC AUTO-ENTMCNC: 28.9 G/DL (ref 32–36)
MCV RBC AUTO: 101.9 FL (ref 80–99)
PLATELET # BLD AUTO: 315 10*3/MM3 (ref 150–450)
PMV BLD AUTO: 10.2 FL (ref 6–12)
POTASSIUM BLD-SCNC: 4.4 MMOL/L (ref 3.5–5.5)
PROTHROMBIN TIME: 13.4 SECONDS (ref 11.2–14.3)
RBC # BLD AUTO: 3.23 10*6/MM3 (ref 3.89–5.14)
SODIUM BLD-SCNC: 137 MMOL/L (ref 132–146)
TIBC SERPL-MCNC: 176 MCG/DL (ref 250–450)
UFH PPP CHRO-ACNC: 0.17 IU/ML (ref 0.3–0.7)
UFH PPP CHRO-ACNC: 0.43 IU/ML (ref 0.3–0.7)
UFH PPP CHRO-ACNC: 1.09 IU/ML (ref 0.3–0.7)
WBC NRBC COR # BLD: 12.07 10*3/MM3 (ref 3.5–10.8)

## 2019-02-13 PROCEDURE — 87186 SC STD MICRODIL/AGAR DIL: CPT | Performed by: INTERNAL MEDICINE

## 2019-02-13 PROCEDURE — 87070 CULTURE OTHR SPECIMN AEROBIC: CPT | Performed by: INTERNAL MEDICINE

## 2019-02-13 PROCEDURE — 87077 CULTURE AEROBIC IDENTIFY: CPT | Performed by: INTERNAL MEDICINE

## 2019-02-13 PROCEDURE — 25010000002 HEPARIN (PORCINE) PER 1000 UNITS

## 2019-02-13 PROCEDURE — 83550 IRON BINDING TEST: CPT | Performed by: INTERNAL MEDICINE

## 2019-02-13 PROCEDURE — 82962 GLUCOSE BLOOD TEST: CPT

## 2019-02-13 PROCEDURE — 85027 COMPLETE CBC AUTOMATED: CPT | Performed by: INTERNAL MEDICINE

## 2019-02-13 PROCEDURE — 85520 HEPARIN ASSAY: CPT

## 2019-02-13 PROCEDURE — 80048 BASIC METABOLIC PNL TOTAL CA: CPT | Performed by: INTERNAL MEDICINE

## 2019-02-13 PROCEDURE — 82728 ASSAY OF FERRITIN: CPT | Performed by: INTERNAL MEDICINE

## 2019-02-13 PROCEDURE — 87075 CULTR BACTERIA EXCEPT BLOOD: CPT | Performed by: INTERNAL MEDICINE

## 2019-02-13 PROCEDURE — 25010000002 ERTAPENEM PER 500 MG: Performed by: INTERNAL MEDICINE

## 2019-02-13 PROCEDURE — 85610 PROTHROMBIN TIME: CPT | Performed by: INTERNAL MEDICINE

## 2019-02-13 PROCEDURE — 63710000001 INSULIN LISPRO (HUMAN) PER 5 UNITS: Performed by: INTERNAL MEDICINE

## 2019-02-13 PROCEDURE — 94760 N-INVAS EAR/PLS OXIMETRY 1: CPT

## 2019-02-13 PROCEDURE — 83540 ASSAY OF IRON: CPT | Performed by: INTERNAL MEDICINE

## 2019-02-13 PROCEDURE — 25010000002 HEPARIN (PORCINE) PER 1000 UNITS: Performed by: INTERNAL MEDICINE

## 2019-02-13 PROCEDURE — 5A1D70Z PERFORMANCE OF URINARY FILTRATION, INTERMITTENT, LESS THAN 6 HOURS PER DAY: ICD-10-PCS | Performed by: INTERNAL MEDICINE

## 2019-02-13 PROCEDURE — 87205 SMEAR GRAM STAIN: CPT | Performed by: INTERNAL MEDICINE

## 2019-02-13 PROCEDURE — 94640 AIRWAY INHALATION TREATMENT: CPT

## 2019-02-13 PROCEDURE — 87147 CULTURE TYPE IMMUNOLOGIC: CPT | Performed by: INTERNAL MEDICINE

## 2019-02-13 PROCEDURE — 94799 UNLISTED PULMONARY SVC/PX: CPT

## 2019-02-13 PROCEDURE — 71045 X-RAY EXAM CHEST 1 VIEW: CPT

## 2019-02-13 RX ORDER — IPRATROPIUM BROMIDE AND ALBUTEROL SULFATE 2.5; .5 MG/3ML; MG/3ML
3 SOLUTION RESPIRATORY (INHALATION) EVERY 4 HOURS PRN
Status: DISCONTINUED | OUTPATIENT
Start: 2019-02-13 | End: 2019-02-20 | Stop reason: HOSPADM

## 2019-02-13 RX ORDER — HEPARIN SODIUM 1000 [USP'U]/ML
1800 INJECTION, SOLUTION INTRAVENOUS; SUBCUTANEOUS AS NEEDED
Status: DISCONTINUED | OUTPATIENT
Start: 2019-02-13 | End: 2019-02-20 | Stop reason: HOSPADM

## 2019-02-13 RX ORDER — HEPARIN SODIUM 1000 [USP'U]/ML
5000 INJECTION, SOLUTION INTRAVENOUS; SUBCUTANEOUS ONCE
Status: COMPLETED | OUTPATIENT
Start: 2019-02-13 | End: 2019-02-13

## 2019-02-13 RX ORDER — ALBUMIN (HUMAN) 12.5 G/50ML
12.5 SOLUTION INTRAVENOUS AS NEEDED
Status: ACTIVE | OUTPATIENT
Start: 2019-02-13 | End: 2019-02-13

## 2019-02-13 RX ORDER — IPRATROPIUM BROMIDE AND ALBUTEROL SULFATE 2.5; .5 MG/3ML; MG/3ML
3 SOLUTION RESPIRATORY (INHALATION)
Status: DISCONTINUED | OUTPATIENT
Start: 2019-02-13 | End: 2019-02-20 | Stop reason: HOSPADM

## 2019-02-13 RX ORDER — WARFARIN SODIUM 5 MG/1
5 TABLET ORAL
Status: DISCONTINUED | OUTPATIENT
Start: 2019-02-13 | End: 2019-02-14

## 2019-02-13 RX ADMIN — PANTOPRAZOLE SODIUM 40 MG: 40 TABLET, DELAYED RELEASE ORAL at 10:30

## 2019-02-13 RX ADMIN — CETIRIZINE HYDROCHLORIDE 10 MG: 10 TABLET, FILM COATED ORAL at 10:30

## 2019-02-13 RX ADMIN — SENNOSIDES AND DOCUSATE SODIUM 2 TABLET: 8.6; 5 TABLET ORAL at 10:29

## 2019-02-13 RX ADMIN — ISOSORBIDE MONONITRATE 30 MG: 30 TABLET, EXTENDED RELEASE ORAL at 10:30

## 2019-02-13 RX ADMIN — HYDROCORTISONE ACETATE 25 MG: 25 SUPPOSITORY RECTAL at 23:05

## 2019-02-13 RX ADMIN — ACETAMINOPHEN 650 MG: 325 TABLET ORAL at 20:05

## 2019-02-13 RX ADMIN — HEPARIN SODIUM 1800 UNITS/HR: 1000 INJECTION, SOLUTION INTRAVENOUS; SUBCUTANEOUS at 10:33

## 2019-02-13 RX ADMIN — RENAGEL 800 MG: 800 TABLET ORAL at 12:08

## 2019-02-13 RX ADMIN — IPRATROPIUM BROMIDE AND ALBUTEROL SULFATE 3 ML: 2.5; .5 SOLUTION RESPIRATORY (INHALATION) at 02:15

## 2019-02-13 RX ADMIN — ERTAPENEM SODIUM 500 MG: 1 INJECTION, POWDER, LYOPHILIZED, FOR SOLUTION INTRAMUSCULAR; INTRAVENOUS at 18:21

## 2019-02-13 RX ADMIN — HYDRALAZINE HYDROCHLORIDE 100 MG: 50 TABLET, FILM COATED ORAL at 10:30

## 2019-02-13 RX ADMIN — CARVEDILOL 25 MG: 12.5 TABLET, FILM COATED ORAL at 18:00

## 2019-02-13 RX ADMIN — CALCITRIOL 0.25 MCG: 0.25 CAPSULE ORAL at 10:31

## 2019-02-13 RX ADMIN — HEPARIN SODIUM 5000 UNITS: 1000 INJECTION, SOLUTION INTRAVENOUS; SUBCUTANEOUS at 07:54

## 2019-02-13 RX ADMIN — SENNOSIDES AND DOCUSATE SODIUM 2 TABLET: 8.6; 5 TABLET ORAL at 20:05

## 2019-02-13 RX ADMIN — HYDRALAZINE HYDROCHLORIDE 100 MG: 50 TABLET, FILM COATED ORAL at 23:04

## 2019-02-13 RX ADMIN — HYDRALAZINE HYDROCHLORIDE 100 MG: 50 TABLET, FILM COATED ORAL at 20:05

## 2019-02-13 RX ADMIN — BUDESONIDE AND FORMOTEROL FUMARATE DIHYDRATE 2 PUFF: 160; 4.5 AEROSOL RESPIRATORY (INHALATION) at 19:58

## 2019-02-13 RX ADMIN — INSULIN LISPRO 2 UNITS: 100 INJECTION, SOLUTION INTRAVENOUS; SUBCUTANEOUS at 12:08

## 2019-02-13 RX ADMIN — MULTIPLE VITAMINS W/ MINERALS TAB 1 TABLET: TAB ORAL at 10:30

## 2019-02-13 RX ADMIN — IPRATROPIUM BROMIDE AND ALBUTEROL SULFATE 3 ML: 2.5; .5 SOLUTION RESPIRATORY (INHALATION) at 16:44

## 2019-02-13 RX ADMIN — ATORVASTATIN CALCIUM 10 MG: 10 TABLET, FILM COATED ORAL at 20:05

## 2019-02-13 RX ADMIN — IPRATROPIUM BROMIDE AND ALBUTEROL SULFATE 3 ML: 2.5; .5 SOLUTION RESPIRATORY (INHALATION) at 19:58

## 2019-02-13 RX ADMIN — AMLODIPINE BESYLATE 10 MG: 10 TABLET ORAL at 10:30

## 2019-02-13 RX ADMIN — INSULIN LISPRO 2 UNITS: 100 INJECTION, SOLUTION INTRAVENOUS; SUBCUTANEOUS at 18:22

## 2019-02-13 RX ADMIN — WARFARIN SODIUM 5 MG: 5 TABLET ORAL at 18:22

## 2019-02-13 RX ADMIN — SERTRALINE HYDROCHLORIDE 50 MG: 50 TABLET ORAL at 10:29

## 2019-02-13 RX ADMIN — RENAGEL 800 MG: 800 TABLET ORAL at 07:59

## 2019-02-13 RX ADMIN — HYDRALAZINE HYDROCHLORIDE 100 MG: 50 TABLET, FILM COATED ORAL at 18:22

## 2019-02-13 RX ADMIN — RENAGEL 800 MG: 800 TABLET ORAL at 18:21

## 2019-02-13 RX ADMIN — INSULIN LISPRO 3 UNITS: 100 INJECTION, SOLUTION INTRAVENOUS; SUBCUTANEOUS at 18:22

## 2019-02-13 RX ADMIN — INSULIN LISPRO 3 UNITS: 100 INJECTION, SOLUTION INTRAVENOUS; SUBCUTANEOUS at 20:06

## 2019-02-13 RX ADMIN — POLYETHYLENE GLYCOL 3350 17 G: 17 POWDER, FOR SOLUTION ORAL at 10:33

## 2019-02-13 RX ADMIN — CARVEDILOL 25 MG: 12.5 TABLET, FILM COATED ORAL at 10:31

## 2019-02-14 LAB
ALBUMIN SERPL-MCNC: 3.36 G/DL (ref 3.2–4.8)
ANION GAP SERPL CALCULATED.3IONS-SCNC: 7 MMOL/L (ref 3–11)
BUN BLD-MCNC: 36 MG/DL (ref 9–23)
BUN/CREAT SERPL: 11.8 (ref 7–25)
CALCIUM SPEC-SCNC: 9 MG/DL (ref 8.7–10.4)
CHLORIDE SERPL-SCNC: 103 MMOL/L (ref 99–109)
CO2 SERPL-SCNC: 27 MMOL/L (ref 20–31)
CREAT BLD-MCNC: 3.05 MG/DL (ref 0.6–1.3)
DEPRECATED RDW RBC AUTO: 68.1 FL (ref 37–54)
ERYTHROCYTE [DISTWIDTH] IN BLOOD BY AUTOMATED COUNT: 19.1 % (ref 11.3–14.5)
GFR SERPL CREATININE-BSD FRML MDRD: 19 ML/MIN/1.73
GLUCOSE BLD-MCNC: 234 MG/DL (ref 70–100)
GLUCOSE BLDC GLUCOMTR-MCNC: 148 MG/DL (ref 70–130)
GLUCOSE BLDC GLUCOMTR-MCNC: 175 MG/DL (ref 70–130)
GLUCOSE BLDC GLUCOMTR-MCNC: 193 MG/DL (ref 70–130)
GLUCOSE BLDC GLUCOMTR-MCNC: 241 MG/DL (ref 70–130)
HCT VFR BLD AUTO: 34.5 % (ref 34.5–44)
HGB BLD-MCNC: 10 G/DL (ref 11.5–15.5)
INR PPP: 1.13 (ref 0.85–1.16)
MCH RBC QN AUTO: 29.2 PG (ref 27–31)
MCHC RBC AUTO-ENTMCNC: 29 G/DL (ref 32–36)
MCV RBC AUTO: 100.6 FL (ref 80–99)
PHOSPHATE SERPL-MCNC: 3.1 MG/DL (ref 2.4–5.1)
PLATELET # BLD AUTO: 330 10*3/MM3 (ref 150–450)
PMV BLD AUTO: 10.2 FL (ref 6–12)
POTASSIUM BLD-SCNC: 4 MMOL/L (ref 3.5–5.5)
PROTHROMBIN TIME: 13.9 SECONDS (ref 11.2–14.3)
RBC # BLD AUTO: 3.43 10*6/MM3 (ref 3.89–5.14)
SODIUM BLD-SCNC: 137 MMOL/L (ref 132–146)
UFH PPP CHRO-ACNC: 0.47 IU/ML (ref 0.3–0.7)
UFH PPP CHRO-ACNC: 0.5 IU/ML (ref 0.3–0.7)
WBC NRBC COR # BLD: 12.03 10*3/MM3 (ref 3.5–10.8)

## 2019-02-14 PROCEDURE — 94799 UNLISTED PULMONARY SVC/PX: CPT

## 2019-02-14 PROCEDURE — 25010000002 ONDANSETRON PER 1 MG: Performed by: NURSE PRACTITIONER

## 2019-02-14 PROCEDURE — 85027 COMPLETE CBC AUTOMATED: CPT | Performed by: INTERNAL MEDICINE

## 2019-02-14 PROCEDURE — 82962 GLUCOSE BLOOD TEST: CPT

## 2019-02-14 PROCEDURE — 63710000001 INSULIN DETEMIR PER 5 UNITS: Performed by: NURSE PRACTITIONER

## 2019-02-14 PROCEDURE — 25010000002 ERTAPENEM PER 500 MG: Performed by: INTERNAL MEDICINE

## 2019-02-14 PROCEDURE — 99232 SBSQ HOSP IP/OBS MODERATE 35: CPT | Performed by: NURSE PRACTITIONER

## 2019-02-14 PROCEDURE — 25010000002 HEPARIN (PORCINE) IN NACL 25000-0.45 UT/250ML-% SOLUTION

## 2019-02-14 PROCEDURE — 85520 HEPARIN ASSAY: CPT

## 2019-02-14 PROCEDURE — 63710000001 INSULIN LISPRO (HUMAN) PER 5 UNITS: Performed by: NURSE PRACTITIONER

## 2019-02-14 PROCEDURE — 85610 PROTHROMBIN TIME: CPT | Performed by: INTERNAL MEDICINE

## 2019-02-14 PROCEDURE — 80069 RENAL FUNCTION PANEL: CPT | Performed by: INTERNAL MEDICINE

## 2019-02-14 PROCEDURE — 97162 PT EVAL MOD COMPLEX 30 MIN: CPT

## 2019-02-14 RX ORDER — WARFARIN SODIUM 7.5 MG/1
7.5 TABLET ORAL
Status: DISCONTINUED | OUTPATIENT
Start: 2019-02-14 | End: 2019-02-15

## 2019-02-14 RX ORDER — FOLIC ACID/VIT B COMPLEX AND C 0.8 MG
1 TABLET ORAL DAILY
Status: DISCONTINUED | OUTPATIENT
Start: 2019-02-14 | End: 2019-02-19

## 2019-02-14 RX ORDER — ALBUMIN (HUMAN) 12.5 G/50ML
12.5 SOLUTION INTRAVENOUS AS NEEDED
Status: CANCELLED | OUTPATIENT
Start: 2019-02-14 | End: 2019-02-15

## 2019-02-14 RX ADMIN — IPRATROPIUM BROMIDE AND ALBUTEROL SULFATE 3 ML: 2.5; .5 SOLUTION RESPIRATORY (INHALATION) at 21:08

## 2019-02-14 RX ADMIN — INSULIN LISPRO 4 UNITS: 100 INJECTION, SOLUTION INTRAVENOUS; SUBCUTANEOUS at 12:46

## 2019-02-14 RX ADMIN — INSULIN DETEMIR 5 UNITS: 100 INJECTION, SOLUTION SUBCUTANEOUS at 21:20

## 2019-02-14 RX ADMIN — INSULIN LISPRO 2 UNITS: 100 INJECTION, SOLUTION INTRAVENOUS; SUBCUTANEOUS at 18:24

## 2019-02-14 RX ADMIN — HEPARIN SODIUM 15 UNITS/KG/HR: 10000 INJECTION, SOLUTION INTRAVENOUS at 14:49

## 2019-02-14 RX ADMIN — MULTIPLE VITAMINS W/ MINERALS TAB 1 TABLET: TAB ORAL at 08:18

## 2019-02-14 RX ADMIN — IPRATROPIUM BROMIDE AND ALBUTEROL SULFATE 3 ML: 2.5; .5 SOLUTION RESPIRATORY (INHALATION) at 13:13

## 2019-02-14 RX ADMIN — INSULIN LISPRO 2 UNITS: 100 INJECTION, SOLUTION INTRAVENOUS; SUBCUTANEOUS at 21:45

## 2019-02-14 RX ADMIN — ATORVASTATIN CALCIUM 10 MG: 10 TABLET, FILM COATED ORAL at 21:20

## 2019-02-14 RX ADMIN — HYDRALAZINE HYDROCHLORIDE 100 MG: 50 TABLET, FILM COATED ORAL at 04:47

## 2019-02-14 RX ADMIN — SERTRALINE HYDROCHLORIDE 50 MG: 50 TABLET ORAL at 08:19

## 2019-02-14 RX ADMIN — ONDANSETRON 4 MG: 2 INJECTION INTRAMUSCULAR; INTRAVENOUS at 08:14

## 2019-02-14 RX ADMIN — ACETAMINOPHEN 650 MG: 325 TABLET ORAL at 21:19

## 2019-02-14 RX ADMIN — BUDESONIDE AND FORMOTEROL FUMARATE DIHYDRATE 2 PUFF: 160; 4.5 AEROSOL RESPIRATORY (INHALATION) at 21:08

## 2019-02-14 RX ADMIN — INSULIN LISPRO 4 UNITS: 100 INJECTION, SOLUTION INTRAVENOUS; SUBCUTANEOUS at 18:23

## 2019-02-14 RX ADMIN — ERTAPENEM SODIUM 500 MG: 1 INJECTION, POWDER, LYOPHILIZED, FOR SOLUTION INTRAMUSCULAR; INTRAVENOUS at 18:21

## 2019-02-14 RX ADMIN — ISOSORBIDE MONONITRATE 30 MG: 30 TABLET, EXTENDED RELEASE ORAL at 08:19

## 2019-02-14 RX ADMIN — IPRATROPIUM BROMIDE AND ALBUTEROL SULFATE 3 ML: 2.5; .5 SOLUTION RESPIRATORY (INHALATION) at 09:22

## 2019-02-14 RX ADMIN — CARVEDILOL 25 MG: 12.5 TABLET, FILM COATED ORAL at 18:22

## 2019-02-14 RX ADMIN — AMLODIPINE BESYLATE 10 MG: 10 TABLET ORAL at 08:19

## 2019-02-14 RX ADMIN — CETIRIZINE HYDROCHLORIDE 10 MG: 10 TABLET, FILM COATED ORAL at 08:18

## 2019-02-14 RX ADMIN — INSULIN LISPRO 3 UNITS: 100 INJECTION, SOLUTION INTRAVENOUS; SUBCUTANEOUS at 08:22

## 2019-02-14 RX ADMIN — SENNOSIDES AND DOCUSATE SODIUM 2 TABLET: 8.6; 5 TABLET ORAL at 21:19

## 2019-02-14 RX ADMIN — ACETAMINOPHEN 650 MG: 325 TABLET ORAL at 01:24

## 2019-02-14 RX ADMIN — INSULIN LISPRO 2 UNITS: 100 INJECTION, SOLUTION INTRAVENOUS; SUBCUTANEOUS at 08:22

## 2019-02-14 RX ADMIN — RENAGEL 800 MG: 800 TABLET ORAL at 12:45

## 2019-02-14 RX ADMIN — IPRATROPIUM BROMIDE AND ALBUTEROL SULFATE 3 ML: 2.5; .5 SOLUTION RESPIRATORY (INHALATION) at 17:28

## 2019-02-14 RX ADMIN — WARFARIN SODIUM 7.5 MG: 7.5 TABLET ORAL at 18:22

## 2019-02-14 RX ADMIN — SENNOSIDES AND DOCUSATE SODIUM 2 TABLET: 8.6; 5 TABLET ORAL at 08:19

## 2019-02-14 RX ADMIN — CARVEDILOL 25 MG: 12.5 TABLET, FILM COATED ORAL at 08:19

## 2019-02-14 RX ADMIN — HYDRALAZINE HYDROCHLORIDE 100 MG: 50 TABLET, FILM COATED ORAL at 21:20

## 2019-02-14 RX ADMIN — Medication 1 TABLET: at 14:49

## 2019-02-14 RX ADMIN — BUDESONIDE AND FORMOTEROL FUMARATE DIHYDRATE 2 PUFF: 160; 4.5 AEROSOL RESPIRATORY (INHALATION) at 09:21

## 2019-02-14 RX ADMIN — RENAGEL 800 MG: 800 TABLET ORAL at 18:22

## 2019-02-14 RX ADMIN — CALCITRIOL 0.25 MCG: 0.25 CAPSULE ORAL at 08:19

## 2019-02-14 RX ADMIN — RENAGEL 800 MG: 800 TABLET ORAL at 08:20

## 2019-02-14 RX ADMIN — HYDROCORTISONE ACETATE 25 MG: 25 SUPPOSITORY RECTAL at 21:20

## 2019-02-14 RX ADMIN — PANTOPRAZOLE SODIUM 40 MG: 40 TABLET, DELAYED RELEASE ORAL at 04:47

## 2019-02-14 RX ADMIN — HYDROCORTISONE ACETATE 25 MG: 25 SUPPOSITORY RECTAL at 14:49

## 2019-02-14 RX ADMIN — HYDRALAZINE HYDROCHLORIDE 100 MG: 50 TABLET, FILM COATED ORAL at 14:49

## 2019-02-14 RX ADMIN — SODIUM CHLORIDE, PRESERVATIVE FREE 3 ML: 5 INJECTION INTRAVENOUS at 08:16

## 2019-02-15 ENCOUNTER — APPOINTMENT (OUTPATIENT)
Dept: NEPHROLOGY | Facility: HOSPITAL | Age: 69
End: 2019-02-15

## 2019-02-15 ENCOUNTER — APPOINTMENT (OUTPATIENT)
Dept: CARDIOLOGY | Facility: HOSPITAL | Age: 69
End: 2019-02-15

## 2019-02-15 LAB
ANION GAP SERPL CALCULATED.3IONS-SCNC: 10 MMOL/L (ref 3–11)
BASOPHILS # BLD AUTO: 0.03 10*3/MM3 (ref 0–0.2)
BASOPHILS NFR BLD AUTO: 0.2 % (ref 0–1)
BUN BLD-MCNC: 51 MG/DL (ref 9–23)
BUN/CREAT SERPL: 12.7 (ref 7–25)
CALCIUM SPEC-SCNC: 9.2 MG/DL (ref 8.7–10.4)
CHLORIDE SERPL-SCNC: 101 MMOL/L (ref 99–109)
CO2 SERPL-SCNC: 24 MMOL/L (ref 20–31)
CREAT BLD-MCNC: 4.02 MG/DL (ref 0.6–1.3)
DEPRECATED RDW RBC AUTO: 69.7 FL (ref 37–54)
EOSINOPHIL # BLD AUTO: 0.37 10*3/MM3 (ref 0–0.3)
EOSINOPHIL NFR BLD AUTO: 3 % (ref 0–3)
ERYTHROCYTE [DISTWIDTH] IN BLOOD BY AUTOMATED COUNT: 19.2 % (ref 11.3–14.5)
GFR SERPL CREATININE-BSD FRML MDRD: 13 ML/MIN/1.73
GLUCOSE BLD-MCNC: 152 MG/DL (ref 70–100)
GLUCOSE BLDC GLUCOMTR-MCNC: 129 MG/DL (ref 70–130)
GLUCOSE BLDC GLUCOMTR-MCNC: 135 MG/DL (ref 70–130)
GLUCOSE BLDC GLUCOMTR-MCNC: 158 MG/DL (ref 70–130)
GLUCOSE BLDC GLUCOMTR-MCNC: 215 MG/DL (ref 70–130)
GLUCOSE BLDC GLUCOMTR-MCNC: 221 MG/DL (ref 70–130)
HCT VFR BLD AUTO: 32.7 % (ref 34.5–44)
HGB BLD-MCNC: 9.3 G/DL (ref 11.5–15.5)
IMM GRANULOCYTES # BLD AUTO: 0.16 10*3/MM3 (ref 0–0.05)
IMM GRANULOCYTES NFR BLD AUTO: 1.3 % (ref 0–0.6)
INR PPP: 1.14 (ref 0.85–1.16)
LYMPHOCYTES # BLD AUTO: 2.71 10*3/MM3 (ref 0.6–4.8)
LYMPHOCYTES NFR BLD AUTO: 22 % (ref 24–44)
MCH RBC QN AUTO: 29.4 PG (ref 27–31)
MCHC RBC AUTO-ENTMCNC: 28.4 G/DL (ref 32–36)
MCV RBC AUTO: 103.5 FL (ref 80–99)
MONOCYTES # BLD AUTO: 0.72 10*3/MM3 (ref 0–1)
MONOCYTES NFR BLD AUTO: 5.9 % (ref 0–12)
NEUTROPHILS # BLD AUTO: 8.47 10*3/MM3 (ref 1.5–8.3)
NEUTROPHILS NFR BLD AUTO: 68.9 % (ref 41–71)
PLATELET # BLD AUTO: 297 10*3/MM3 (ref 150–450)
PMV BLD AUTO: 10.2 FL (ref 6–12)
POTASSIUM BLD-SCNC: 4.7 MMOL/L (ref 3.5–5.5)
PROTHROMBIN TIME: 14.1 SECONDS (ref 11.2–14.3)
RBC # BLD AUTO: 3.16 10*6/MM3 (ref 3.89–5.14)
SODIUM BLD-SCNC: 135 MMOL/L (ref 132–146)
UFH PPP CHRO-ACNC: 0.53 IU/ML (ref 0.3–0.7)
WBC NRBC COR # BLD: 12.3 10*3/MM3 (ref 3.5–10.8)

## 2019-02-15 PROCEDURE — 80048 BASIC METABOLIC PNL TOTAL CA: CPT | Performed by: NURSE PRACTITIONER

## 2019-02-15 PROCEDURE — 85520 HEPARIN ASSAY: CPT

## 2019-02-15 PROCEDURE — 25010000002 HEPARIN (PORCINE) IN NACL 25000-0.45 UT/250ML-% SOLUTION

## 2019-02-15 PROCEDURE — 63710000001 INSULIN DETEMIR PER 5 UNITS: Performed by: NURSE PRACTITIONER

## 2019-02-15 PROCEDURE — 85025 COMPLETE CBC W/AUTO DIFF WBC: CPT

## 2019-02-15 PROCEDURE — 85610 PROTHROMBIN TIME: CPT | Performed by: INTERNAL MEDICINE

## 2019-02-15 PROCEDURE — 94799 UNLISTED PULMONARY SVC/PX: CPT

## 2019-02-15 PROCEDURE — 99232 SBSQ HOSP IP/OBS MODERATE 35: CPT | Performed by: NURSE PRACTITIONER

## 2019-02-15 PROCEDURE — 25010000002 ERTAPENEM PER 500 MG: Performed by: INTERNAL MEDICINE

## 2019-02-15 PROCEDURE — 25010000002 HEPARIN (PORCINE) PER 1000 UNITS: Performed by: INTERNAL MEDICINE

## 2019-02-15 PROCEDURE — 93971 EXTREMITY STUDY: CPT | Performed by: INTERNAL MEDICINE

## 2019-02-15 PROCEDURE — 93971 EXTREMITY STUDY: CPT

## 2019-02-15 PROCEDURE — 63710000001 INSULIN LISPRO (HUMAN) PER 5 UNITS: Performed by: NURSE PRACTITIONER

## 2019-02-15 PROCEDURE — 82962 GLUCOSE BLOOD TEST: CPT

## 2019-02-15 RX ORDER — DIAPER,BRIEF,INFANT-TODD,DISP
EACH MISCELLANEOUS EVERY 12 HOURS SCHEDULED
Status: DISCONTINUED | OUTPATIENT
Start: 2019-02-15 | End: 2019-02-20 | Stop reason: HOSPADM

## 2019-02-15 RX ORDER — WARFARIN SODIUM 5 MG/1
10 TABLET ORAL
Status: DISCONTINUED | OUTPATIENT
Start: 2019-02-15 | End: 2019-02-19

## 2019-02-15 RX ADMIN — HYDRALAZINE HYDROCHLORIDE 100 MG: 50 TABLET, FILM COATED ORAL at 16:45

## 2019-02-15 RX ADMIN — RENAGEL 800 MG: 800 TABLET ORAL at 09:45

## 2019-02-15 RX ADMIN — CALCITRIOL 0.25 MCG: 0.25 CAPSULE ORAL at 11:01

## 2019-02-15 RX ADMIN — CARVEDILOL 25 MG: 12.5 TABLET, FILM COATED ORAL at 11:03

## 2019-02-15 RX ADMIN — INSULIN LISPRO 4 UNITS: 100 INJECTION, SOLUTION INTRAVENOUS; SUBCUTANEOUS at 09:43

## 2019-02-15 RX ADMIN — CETIRIZINE HYDROCHLORIDE 10 MG: 10 TABLET, FILM COATED ORAL at 11:03

## 2019-02-15 RX ADMIN — ATORVASTATIN CALCIUM 10 MG: 10 TABLET, FILM COATED ORAL at 20:13

## 2019-02-15 RX ADMIN — RENAGEL 800 MG: 800 TABLET ORAL at 12:50

## 2019-02-15 RX ADMIN — MULTIPLE VITAMINS W/ MINERALS TAB 1 TABLET: TAB ORAL at 11:03

## 2019-02-15 RX ADMIN — PANTOPRAZOLE SODIUM 40 MG: 40 TABLET, DELAYED RELEASE ORAL at 11:02

## 2019-02-15 RX ADMIN — IPRATROPIUM BROMIDE AND ALBUTEROL SULFATE 3 ML: 2.5; .5 SOLUTION RESPIRATORY (INHALATION) at 16:14

## 2019-02-15 RX ADMIN — INSULIN LISPRO 4 UNITS: 100 INJECTION, SOLUTION INTRAVENOUS; SUBCUTANEOUS at 12:50

## 2019-02-15 RX ADMIN — INSULIN LISPRO 3 UNITS: 100 INJECTION, SOLUTION INTRAVENOUS; SUBCUTANEOUS at 18:07

## 2019-02-15 RX ADMIN — Medication 1 TABLET: at 11:02

## 2019-02-15 RX ADMIN — ACETAMINOPHEN 650 MG: 325 TABLET ORAL at 21:15

## 2019-02-15 RX ADMIN — BACITRACIN ZINC: 500 OINTMENT TOPICAL at 20:14

## 2019-02-15 RX ADMIN — SENNOSIDES AND DOCUSATE SODIUM 2 TABLET: 8.6; 5 TABLET ORAL at 11:04

## 2019-02-15 RX ADMIN — IPRATROPIUM BROMIDE AND ALBUTEROL SULFATE 3 ML: 2.5; .5 SOLUTION RESPIRATORY (INHALATION) at 13:17

## 2019-02-15 RX ADMIN — POLYETHYLENE GLYCOL 3350 17 G: 17 POWDER, FOR SOLUTION ORAL at 11:05

## 2019-02-15 RX ADMIN — ERTAPENEM SODIUM 500 MG: 1 INJECTION, POWDER, LYOPHILIZED, FOR SOLUTION INTRAMUSCULAR; INTRAVENOUS at 18:05

## 2019-02-15 RX ADMIN — ISOSORBIDE MONONITRATE 30 MG: 30 TABLET, EXTENDED RELEASE ORAL at 11:02

## 2019-02-15 RX ADMIN — HEPARIN SODIUM 12 UNITS/KG/HR: 10000 INJECTION, SOLUTION INTRAVENOUS at 23:21

## 2019-02-15 RX ADMIN — INSULIN LISPRO 4 UNITS: 100 INJECTION, SOLUTION INTRAVENOUS; SUBCUTANEOUS at 18:07

## 2019-02-15 RX ADMIN — HYDROCORTISONE ACETATE 25 MG: 25 SUPPOSITORY RECTAL at 11:05

## 2019-02-15 RX ADMIN — INSULIN LISPRO 3 UNITS: 100 INJECTION, SOLUTION INTRAVENOUS; SUBCUTANEOUS at 20:15

## 2019-02-15 RX ADMIN — AMLODIPINE BESYLATE 10 MG: 10 TABLET ORAL at 11:03

## 2019-02-15 RX ADMIN — INSULIN DETEMIR 5 UNITS: 100 INJECTION, SOLUTION SUBCUTANEOUS at 20:15

## 2019-02-15 RX ADMIN — CARVEDILOL 25 MG: 12.5 TABLET, FILM COATED ORAL at 18:05

## 2019-02-15 RX ADMIN — HYDROCORTISONE ACETATE 25 MG: 25 SUPPOSITORY RECTAL at 20:14

## 2019-02-15 RX ADMIN — RENAGEL 800 MG: 800 TABLET ORAL at 18:05

## 2019-02-15 RX ADMIN — HEPARIN SODIUM 15 UNITS/KG/HR: 10000 INJECTION, SOLUTION INTRAVENOUS at 05:27

## 2019-02-15 RX ADMIN — IPRATROPIUM BROMIDE AND ALBUTEROL SULFATE 3 ML: 2.5; .5 SOLUTION RESPIRATORY (INHALATION) at 19:56

## 2019-02-15 RX ADMIN — HYDRALAZINE HYDROCHLORIDE 100 MG: 50 TABLET, FILM COATED ORAL at 20:14

## 2019-02-15 RX ADMIN — BUDESONIDE AND FORMOTEROL FUMARATE DIHYDRATE 2 PUFF: 160; 4.5 AEROSOL RESPIRATORY (INHALATION) at 19:57

## 2019-02-15 RX ADMIN — HEPARIN SODIUM 1800 UNITS/HR: 1000 INJECTION, SOLUTION INTRAVENOUS; SUBCUTANEOUS at 11:06

## 2019-02-15 RX ADMIN — SERTRALINE HYDROCHLORIDE 50 MG: 50 TABLET ORAL at 11:02

## 2019-02-15 RX ADMIN — SENNOSIDES AND DOCUSATE SODIUM 2 TABLET: 8.6; 5 TABLET ORAL at 20:13

## 2019-02-16 LAB
BACTERIA SPEC AEROBE CULT: ABNORMAL
BACTERIA SPEC AEROBE CULT: ABNORMAL
BACTERIA SPEC AEROBE CULT: NORMAL
BACTERIA SPEC AEROBE CULT: NORMAL
BH CV UPPER VENOUS LEFT AXILLARY AUGMENT: NORMAL
BH CV UPPER VENOUS LEFT AXILLARY COMPRESS: NORMAL
BH CV UPPER VENOUS LEFT AXILLARY PHASIC: NORMAL
BH CV UPPER VENOUS LEFT AXILLARY SPONT: NORMAL
BH CV UPPER VENOUS LEFT BASILIC FOREARM COLOR: 1
BH CV UPPER VENOUS LEFT BASILIC FOREARM COMPRESS: NORMAL
BH CV UPPER VENOUS LEFT BASILIC UPPER COMPRESS: NORMAL
BH CV UPPER VENOUS LEFT BRACHIAL COLOR: 1
BH CV UPPER VENOUS LEFT BRACHIAL COMPRESS: NORMAL
BH CV UPPER VENOUS LEFT CEPHALIC FOREARM COMPRESS: NORMAL
BH CV UPPER VENOUS LEFT CEPHALIC UPPER COMPRESS: NORMAL
BH CV UPPER VENOUS LEFT INTERNAL JUGULAR AUGMENT: NORMAL
BH CV UPPER VENOUS LEFT INTERNAL JUGULAR COMPRESS: NORMAL
BH CV UPPER VENOUS LEFT INTERNAL JUGULAR PHASIC: NORMAL
BH CV UPPER VENOUS LEFT INTERNAL JUGULAR SPONT: NORMAL
BH CV UPPER VENOUS LEFT RADIAL COMPRESS: NORMAL
BH CV UPPER VENOUS LEFT SUBCLAVIAN AUGMENT: NORMAL
BH CV UPPER VENOUS LEFT SUBCLAVIAN COMPRESS: NORMAL
BH CV UPPER VENOUS LEFT SUBCLAVIAN PHASIC: NORMAL
BH CV UPPER VENOUS LEFT SUBCLAVIAN SPONT: NORMAL
BH CV UPPER VENOUS LEFT ULNAR COMPRESS: NORMAL
BH CV UPPER VENOUS RIGHT SUBCLAVIAN AUGMENT: NORMAL
BH CV UPPER VENOUS RIGHT SUBCLAVIAN COMPRESS: NORMAL
BH CV UPPER VENOUS RIGHT SUBCLAVIAN PHASIC: NORMAL
BH CV UPPER VENOUS RIGHT SUBCLAVIAN SPONT: NORMAL
GLUCOSE BLDC GLUCOMTR-MCNC: 138 MG/DL (ref 70–130)
GLUCOSE BLDC GLUCOMTR-MCNC: 146 MG/DL (ref 70–130)
GLUCOSE BLDC GLUCOMTR-MCNC: 174 MG/DL (ref 70–130)
GLUCOSE BLDC GLUCOMTR-MCNC: 207 MG/DL (ref 70–130)
GRAM STN SPEC: ABNORMAL
INR PPP: 1.24 (ref 0.85–1.16)
PROTHROMBIN TIME: 15.1 SECONDS (ref 11.2–14.3)
UFH PPP CHRO-ACNC: 0.26 IU/ML (ref 0.3–0.7)
UFH PPP CHRO-ACNC: 0.44 IU/ML (ref 0.3–0.7)

## 2019-02-16 PROCEDURE — 97530 THERAPEUTIC ACTIVITIES: CPT

## 2019-02-16 PROCEDURE — 99233 SBSQ HOSP IP/OBS HIGH 50: CPT | Performed by: INTERNAL MEDICINE

## 2019-02-16 PROCEDURE — 63710000001 INSULIN DETEMIR PER 5 UNITS: Performed by: INTERNAL MEDICINE

## 2019-02-16 PROCEDURE — 97110 THERAPEUTIC EXERCISES: CPT

## 2019-02-16 PROCEDURE — 85520 HEPARIN ASSAY: CPT

## 2019-02-16 PROCEDURE — 25010000002 HEPARIN (PORCINE) PER 1000 UNITS

## 2019-02-16 PROCEDURE — 85610 PROTHROMBIN TIME: CPT | Performed by: INTERNAL MEDICINE

## 2019-02-16 PROCEDURE — 82962 GLUCOSE BLOOD TEST: CPT

## 2019-02-16 PROCEDURE — 25010000002 HEPARIN (PORCINE) IN NACL 25000-0.45 UT/250ML-% SOLUTION

## 2019-02-16 PROCEDURE — 25010000002 ONDANSETRON PER 1 MG: Performed by: NURSE PRACTITIONER

## 2019-02-16 PROCEDURE — 94799 UNLISTED PULMONARY SVC/PX: CPT

## 2019-02-16 PROCEDURE — 63710000001 INSULIN LISPRO (HUMAN) PER 5 UNITS: Performed by: INTERNAL MEDICINE

## 2019-02-16 PROCEDURE — 25010000002 ERTAPENEM 500MG/50 ML SOLUTION: Performed by: INTERNAL MEDICINE

## 2019-02-16 RX ORDER — HYDROCORTISONE ACETATE 25 MG/1
25 SUPPOSITORY RECTAL 2 TIMES DAILY PRN
Status: DISCONTINUED | OUTPATIENT
Start: 2019-02-16 | End: 2019-02-20 | Stop reason: HOSPADM

## 2019-02-16 RX ORDER — HEPARIN SODIUM 1000 [USP'U]/ML
4000 INJECTION, SOLUTION INTRAVENOUS; SUBCUTANEOUS ONCE
Status: COMPLETED | OUTPATIENT
Start: 2019-02-16 | End: 2019-02-16

## 2019-02-16 RX ORDER — LOPERAMIDE HYDROCHLORIDE 2 MG/1
2 CAPSULE ORAL 4 TIMES DAILY PRN
Status: DISCONTINUED | OUTPATIENT
Start: 2019-02-16 | End: 2019-02-20 | Stop reason: HOSPADM

## 2019-02-16 RX ORDER — L.ACID,PARA/B.BIFIDUM/S.THERM 8B CELL
1 CAPSULE ORAL DAILY
Status: DISCONTINUED | OUTPATIENT
Start: 2019-02-16 | End: 2019-02-20 | Stop reason: HOSPADM

## 2019-02-16 RX ADMIN — IPRATROPIUM BROMIDE AND ALBUTEROL SULFATE 3 ML: 2.5; .5 SOLUTION RESPIRATORY (INHALATION) at 20:06

## 2019-02-16 RX ADMIN — ATORVASTATIN CALCIUM 10 MG: 10 TABLET, FILM COATED ORAL at 21:36

## 2019-02-16 RX ADMIN — INSULIN LISPRO 5 UNITS: 100 INJECTION, SOLUTION INTRAVENOUS; SUBCUTANEOUS at 18:49

## 2019-02-16 RX ADMIN — PANTOPRAZOLE SODIUM 40 MG: 40 TABLET, DELAYED RELEASE ORAL at 06:14

## 2019-02-16 RX ADMIN — CARVEDILOL 25 MG: 12.5 TABLET, FILM COATED ORAL at 18:48

## 2019-02-16 RX ADMIN — ERTAPENEM SODIUM 500 MG: 1 INJECTION, POWDER, LYOPHILIZED, FOR SOLUTION INTRAMUSCULAR; INTRAVENOUS at 16:00

## 2019-02-16 RX ADMIN — IPRATROPIUM BROMIDE AND ALBUTEROL SULFATE 3 ML: 2.5; .5 SOLUTION RESPIRATORY (INHALATION) at 08:20

## 2019-02-16 RX ADMIN — HYDROCORTISONE ACETATE 25 MG: 25 SUPPOSITORY RECTAL at 13:45

## 2019-02-16 RX ADMIN — IPRATROPIUM BROMIDE AND ALBUTEROL SULFATE 3 ML: 2.5; .5 SOLUTION RESPIRATORY (INHALATION) at 11:58

## 2019-02-16 RX ADMIN — LOPERAMIDE HYDROCHLORIDE 2 MG: 2 CAPSULE ORAL at 21:36

## 2019-02-16 RX ADMIN — ISOSORBIDE MONONITRATE 30 MG: 30 TABLET, EXTENDED RELEASE ORAL at 09:58

## 2019-02-16 RX ADMIN — HYDRALAZINE HYDROCHLORIDE 100 MG: 50 TABLET, FILM COATED ORAL at 14:00

## 2019-02-16 RX ADMIN — RENAGEL 800 MG: 800 TABLET ORAL at 18:48

## 2019-02-16 RX ADMIN — HEPARIN SODIUM 13.5 UNITS/KG/HR: 10000 INJECTION, SOLUTION INTRAVENOUS at 17:06

## 2019-02-16 RX ADMIN — INSULIN LISPRO 5 UNITS: 100 INJECTION, SOLUTION INTRAVENOUS; SUBCUTANEOUS at 12:28

## 2019-02-16 RX ADMIN — MULTIPLE VITAMINS W/ MINERALS TAB 1 TABLET: TAB ORAL at 09:46

## 2019-02-16 RX ADMIN — CALCITRIOL 0.25 MCG: 0.25 CAPSULE ORAL at 09:49

## 2019-02-16 RX ADMIN — WARFARIN SODIUM 10 MG: 5 TABLET ORAL at 18:48

## 2019-02-16 RX ADMIN — RENAGEL 800 MG: 800 TABLET ORAL at 12:27

## 2019-02-16 RX ADMIN — ONDANSETRON 4 MG: 2 INJECTION INTRAMUSCULAR; INTRAVENOUS at 20:57

## 2019-02-16 RX ADMIN — Medication 1 CAPSULE: at 17:42

## 2019-02-16 RX ADMIN — BACITRACIN ZINC: 500 OINTMENT TOPICAL at 09:58

## 2019-02-16 RX ADMIN — AMLODIPINE BESYLATE 10 MG: 10 TABLET ORAL at 09:46

## 2019-02-16 RX ADMIN — Medication 1 TABLET: at 09:46

## 2019-02-16 RX ADMIN — SODIUM CHLORIDE, PRESERVATIVE FREE 3 ML: 5 INJECTION INTRAVENOUS at 21:39

## 2019-02-16 RX ADMIN — SODIUM CHLORIDE, PRESERVATIVE FREE 3 ML: 5 INJECTION INTRAVENOUS at 12:29

## 2019-02-16 RX ADMIN — HYDROCORTISONE ACETATE 25 MG: 25 SUPPOSITORY RECTAL at 09:48

## 2019-02-16 RX ADMIN — PSYLLIUM HUSK 1 PACKET: 3.5 GRANULE ORAL at 21:40

## 2019-02-16 RX ADMIN — ACETAMINOPHEN 650 MG: 650 SUPPOSITORY RECTAL at 13:45

## 2019-02-16 RX ADMIN — INSULIN DETEMIR 6 UNITS: 100 INJECTION, SOLUTION SUBCUTANEOUS at 21:37

## 2019-02-16 RX ADMIN — HYDRALAZINE HYDROCHLORIDE 100 MG: 50 TABLET, FILM COATED ORAL at 06:14

## 2019-02-16 RX ADMIN — CARVEDILOL 25 MG: 12.5 TABLET, FILM COATED ORAL at 09:46

## 2019-02-16 RX ADMIN — BACITRACIN ZINC: 500 OINTMENT TOPICAL at 18:17

## 2019-02-16 RX ADMIN — BACITRACIN ZINC: 500 OINTMENT TOPICAL at 21:44

## 2019-02-16 RX ADMIN — HYDRALAZINE HYDROCHLORIDE 100 MG: 50 TABLET, FILM COATED ORAL at 21:36

## 2019-02-16 RX ADMIN — HEPARIN SODIUM 4000 UNITS: 1000 INJECTION, SOLUTION INTRAVENOUS; SUBCUTANEOUS at 07:26

## 2019-02-16 RX ADMIN — INSULIN LISPRO 3 UNITS: 100 INJECTION, SOLUTION INTRAVENOUS; SUBCUTANEOUS at 21:38

## 2019-02-16 RX ADMIN — BUDESONIDE AND FORMOTEROL FUMARATE DIHYDRATE 2 PUFF: 160; 4.5 AEROSOL RESPIRATORY (INHALATION) at 08:20

## 2019-02-16 RX ADMIN — SERTRALINE HYDROCHLORIDE 50 MG: 50 TABLET ORAL at 09:46

## 2019-02-16 RX ADMIN — CETIRIZINE HYDROCHLORIDE 10 MG: 10 TABLET, FILM COATED ORAL at 09:46

## 2019-02-16 RX ADMIN — BUDESONIDE AND FORMOTEROL FUMARATE DIHYDRATE 2 PUFF: 160; 4.5 AEROSOL RESPIRATORY (INHALATION) at 20:07

## 2019-02-16 RX ADMIN — RENAGEL 800 MG: 800 TABLET ORAL at 10:02

## 2019-02-16 RX ADMIN — INSULIN LISPRO 2 UNITS: 100 INJECTION, SOLUTION INTRAVENOUS; SUBCUTANEOUS at 12:28

## 2019-02-16 RX ADMIN — LOPERAMIDE HYDROCHLORIDE 2 MG: 2 CAPSULE ORAL at 15:28

## 2019-02-17 LAB
ANION GAP SERPL CALCULATED.3IONS-SCNC: 11 MMOL/L (ref 3–11)
BUN BLD-MCNC: 48 MG/DL (ref 9–23)
BUN/CREAT SERPL: 11.3 (ref 7–25)
CALCIUM SPEC-SCNC: 8.9 MG/DL (ref 8.7–10.4)
CHLORIDE SERPL-SCNC: 104 MMOL/L (ref 99–109)
CO2 SERPL-SCNC: 25 MMOL/L (ref 20–31)
CREAT BLD-MCNC: 4.24 MG/DL (ref 0.6–1.3)
GFR SERPL CREATININE-BSD FRML MDRD: 13 ML/MIN/1.73
GLUCOSE BLD-MCNC: 150 MG/DL (ref 70–100)
GLUCOSE BLDC GLUCOMTR-MCNC: 122 MG/DL (ref 70–130)
GLUCOSE BLDC GLUCOMTR-MCNC: 143 MG/DL (ref 70–130)
GLUCOSE BLDC GLUCOMTR-MCNC: 180 MG/DL (ref 70–130)
GLUCOSE BLDC GLUCOMTR-MCNC: 186 MG/DL (ref 70–130)
INR PPP: 1.26 (ref 0.85–1.16)
POTASSIUM BLD-SCNC: 4.6 MMOL/L (ref 3.5–5.5)
PROTHROMBIN TIME: 15.2 SECONDS (ref 11.2–14.3)
SODIUM BLD-SCNC: 140 MMOL/L (ref 132–146)
UFH PPP CHRO-ACNC: 0.55 IU/ML (ref 0.3–0.7)
UFH PPP CHRO-ACNC: 0.59 IU/ML (ref 0.3–0.7)

## 2019-02-17 PROCEDURE — 63710000001 INSULIN DETEMIR PER 5 UNITS: Performed by: INTERNAL MEDICINE

## 2019-02-17 PROCEDURE — 25010000002 ERTAPENEM 500MG/50 ML SOLUTION: Performed by: INTERNAL MEDICINE

## 2019-02-17 PROCEDURE — 85520 HEPARIN ASSAY: CPT

## 2019-02-17 PROCEDURE — 85610 PROTHROMBIN TIME: CPT | Performed by: INTERNAL MEDICINE

## 2019-02-17 PROCEDURE — 99232 SBSQ HOSP IP/OBS MODERATE 35: CPT | Performed by: INTERNAL MEDICINE

## 2019-02-17 PROCEDURE — 85025 COMPLETE CBC W/AUTO DIFF WBC: CPT

## 2019-02-17 PROCEDURE — 82962 GLUCOSE BLOOD TEST: CPT

## 2019-02-17 PROCEDURE — 94760 N-INVAS EAR/PLS OXIMETRY 1: CPT

## 2019-02-17 PROCEDURE — 80048 BASIC METABOLIC PNL TOTAL CA: CPT | Performed by: INTERNAL MEDICINE

## 2019-02-17 PROCEDURE — 25010000002 HEPARIN (PORCINE) PER 1000 UNITS

## 2019-02-17 PROCEDURE — 94799 UNLISTED PULMONARY SVC/PX: CPT

## 2019-02-17 RX ORDER — ALBUMIN (HUMAN) 12.5 G/50ML
12.5 SOLUTION INTRAVENOUS AS NEEDED
Status: CANCELLED | OUTPATIENT
Start: 2019-02-18 | End: 2019-02-18

## 2019-02-17 RX ADMIN — CALCITRIOL 0.25 MCG: 0.25 CAPSULE ORAL at 08:26

## 2019-02-17 RX ADMIN — CETIRIZINE HYDROCHLORIDE 10 MG: 10 TABLET, FILM COATED ORAL at 08:27

## 2019-02-17 RX ADMIN — WARFARIN SODIUM 10 MG: 5 TABLET ORAL at 17:45

## 2019-02-17 RX ADMIN — ISOSORBIDE MONONITRATE 30 MG: 30 TABLET, EXTENDED RELEASE ORAL at 08:28

## 2019-02-17 RX ADMIN — IPRATROPIUM BROMIDE AND ALBUTEROL SULFATE 3 ML: 2.5; .5 SOLUTION RESPIRATORY (INHALATION) at 13:49

## 2019-02-17 RX ADMIN — CARVEDILOL 25 MG: 12.5 TABLET, FILM COATED ORAL at 08:27

## 2019-02-17 RX ADMIN — IPRATROPIUM BROMIDE AND ALBUTEROL SULFATE 3 ML: 2.5; .5 SOLUTION RESPIRATORY (INHALATION) at 09:29

## 2019-02-17 RX ADMIN — CARVEDILOL 25 MG: 12.5 TABLET, FILM COATED ORAL at 17:45

## 2019-02-17 RX ADMIN — Medication 1 CAPSULE: at 08:28

## 2019-02-17 RX ADMIN — RENAGEL 800 MG: 800 TABLET ORAL at 12:26

## 2019-02-17 RX ADMIN — ERTAPENEM SODIUM 500 MG: 1 INJECTION, POWDER, LYOPHILIZED, FOR SOLUTION INTRAMUSCULAR; INTRAVENOUS at 15:25

## 2019-02-17 RX ADMIN — BUDESONIDE AND FORMOTEROL FUMARATE DIHYDRATE 2 PUFF: 160; 4.5 AEROSOL RESPIRATORY (INHALATION) at 20:25

## 2019-02-17 RX ADMIN — INSULIN LISPRO 2 UNITS: 100 INJECTION, SOLUTION INTRAVENOUS; SUBCUTANEOUS at 12:26

## 2019-02-17 RX ADMIN — INSULIN LISPRO 5 UNITS: 100 INJECTION, SOLUTION INTRAVENOUS; SUBCUTANEOUS at 08:23

## 2019-02-17 RX ADMIN — SODIUM CHLORIDE, PRESERVATIVE FREE 3 ML: 5 INJECTION INTRAVENOUS at 08:32

## 2019-02-17 RX ADMIN — RENAGEL 800 MG: 800 TABLET ORAL at 08:26

## 2019-02-17 RX ADMIN — BUDESONIDE AND FORMOTEROL FUMARATE DIHYDRATE 2 PUFF: 160; 4.5 AEROSOL RESPIRATORY (INHALATION) at 09:36

## 2019-02-17 RX ADMIN — AMLODIPINE BESYLATE 10 MG: 10 TABLET ORAL at 08:27

## 2019-02-17 RX ADMIN — HYDRALAZINE HYDROCHLORIDE 100 MG: 50 TABLET, FILM COATED ORAL at 15:24

## 2019-02-17 RX ADMIN — HYDRALAZINE HYDROCHLORIDE 100 MG: 50 TABLET, FILM COATED ORAL at 21:29

## 2019-02-17 RX ADMIN — HYDRALAZINE HYDROCHLORIDE 100 MG: 50 TABLET, FILM COATED ORAL at 05:12

## 2019-02-17 RX ADMIN — HEPARIN SODIUM 13.5 UNITS/KG/HR: 10000 INJECTION, SOLUTION INTRAVENOUS at 19:10

## 2019-02-17 RX ADMIN — LOPERAMIDE HYDROCHLORIDE 2 MG: 2 CAPSULE ORAL at 08:27

## 2019-02-17 RX ADMIN — BACITRACIN ZINC: 500 OINTMENT TOPICAL at 21:55

## 2019-02-17 RX ADMIN — HEPARIN SODIUM 13.5 UNITS/KG/HR: 10000 INJECTION, SOLUTION INTRAVENOUS at 03:54

## 2019-02-17 RX ADMIN — RENAGEL 800 MG: 800 TABLET ORAL at 17:58

## 2019-02-17 RX ADMIN — INSULIN DETEMIR 6 UNITS: 100 INJECTION, SOLUTION SUBCUTANEOUS at 21:27

## 2019-02-17 RX ADMIN — Medication 1 TABLET: at 08:28

## 2019-02-17 RX ADMIN — MULTIPLE VITAMINS W/ MINERALS TAB 1 TABLET: TAB ORAL at 08:28

## 2019-02-17 RX ADMIN — INSULIN LISPRO 5 UNITS: 100 INJECTION, SOLUTION INTRAVENOUS; SUBCUTANEOUS at 12:25

## 2019-02-17 RX ADMIN — PANTOPRAZOLE SODIUM 40 MG: 40 TABLET, DELAYED RELEASE ORAL at 05:12

## 2019-02-17 RX ADMIN — ATORVASTATIN CALCIUM 10 MG: 10 TABLET, FILM COATED ORAL at 21:29

## 2019-02-17 RX ADMIN — BACITRACIN ZINC: 500 OINTMENT TOPICAL at 08:32

## 2019-02-17 RX ADMIN — IPRATROPIUM BROMIDE AND ALBUTEROL SULFATE 3 ML: 2.5; .5 SOLUTION RESPIRATORY (INHALATION) at 20:25

## 2019-02-17 RX ADMIN — INSULIN LISPRO 2 UNITS: 100 INJECTION, SOLUTION INTRAVENOUS; SUBCUTANEOUS at 08:24

## 2019-02-17 RX ADMIN — SERTRALINE HYDROCHLORIDE 50 MG: 50 TABLET ORAL at 08:28

## 2019-02-17 RX ADMIN — INSULIN LISPRO 5 UNITS: 100 INJECTION, SOLUTION INTRAVENOUS; SUBCUTANEOUS at 17:44

## 2019-02-18 ENCOUNTER — APPOINTMENT (OUTPATIENT)
Dept: NEPHROLOGY | Facility: HOSPITAL | Age: 69
End: 2019-02-18

## 2019-02-18 LAB
BASOPHILS # BLD AUTO: 0.02 10*3/MM3 (ref 0–0.2)
BASOPHILS NFR BLD AUTO: 0.2 % (ref 0–1)
DEPRECATED RDW RBC AUTO: 71.8 FL (ref 37–54)
EOSINOPHIL # BLD AUTO: 0.35 10*3/MM3 (ref 0–0.3)
EOSINOPHIL NFR BLD AUTO: 3.3 % (ref 0–3)
ERYTHROCYTE [DISTWIDTH] IN BLOOD BY AUTOMATED COUNT: 19.4 % (ref 11.3–14.5)
GLUCOSE BLDC GLUCOMTR-MCNC: 128 MG/DL (ref 70–130)
GLUCOSE BLDC GLUCOMTR-MCNC: 140 MG/DL (ref 70–130)
GLUCOSE BLDC GLUCOMTR-MCNC: 195 MG/DL (ref 70–130)
GLUCOSE BLDC GLUCOMTR-MCNC: 231 MG/DL (ref 70–130)
HCT VFR BLD AUTO: 33.1 % (ref 34.5–44)
HGB BLD-MCNC: 9.6 G/DL (ref 11.5–15.5)
IMM GRANULOCYTES # BLD AUTO: 0.09 10*3/MM3 (ref 0–0.05)
IMM GRANULOCYTES NFR BLD AUTO: 0.8 % (ref 0–0.6)
INR PPP: 1.57 (ref 0.85–1.16)
LYMPHOCYTES # BLD AUTO: 2.4 10*3/MM3 (ref 0.6–4.8)
LYMPHOCYTES NFR BLD AUTO: 22.3 % (ref 24–44)
MCH RBC QN AUTO: 29.5 PG (ref 27–31)
MCHC RBC AUTO-ENTMCNC: 29 G/DL (ref 32–36)
MCV RBC AUTO: 101.8 FL (ref 80–99)
MONOCYTES # BLD AUTO: 0.47 10*3/MM3 (ref 0–1)
MONOCYTES NFR BLD AUTO: 4.4 % (ref 0–12)
NEUTROPHILS # BLD AUTO: 7.52 10*3/MM3 (ref 1.5–8.3)
NEUTROPHILS NFR BLD AUTO: 69.8 % (ref 41–71)
PLATELET # BLD AUTO: 247 10*3/MM3 (ref 150–450)
PMV BLD AUTO: 10.8 FL (ref 6–12)
PROTHROMBIN TIME: 18.1 SECONDS (ref 11.2–14.3)
RBC # BLD AUTO: 3.25 10*6/MM3 (ref 3.89–5.14)
UFH PPP CHRO-ACNC: 0.47 IU/ML (ref 0.3–0.7)
WBC NRBC COR # BLD: 10.76 10*3/MM3 (ref 3.5–10.8)

## 2019-02-18 PROCEDURE — 85520 HEPARIN ASSAY: CPT

## 2019-02-18 PROCEDURE — 99232 SBSQ HOSP IP/OBS MODERATE 35: CPT | Performed by: INTERNAL MEDICINE

## 2019-02-18 PROCEDURE — 82962 GLUCOSE BLOOD TEST: CPT

## 2019-02-18 PROCEDURE — P9047 ALBUMIN (HUMAN), 25%, 50ML: HCPCS | Performed by: INTERNAL MEDICINE

## 2019-02-18 PROCEDURE — 25010000002 ALBUMIN HUMAN 25% PER 50 ML: Performed by: INTERNAL MEDICINE

## 2019-02-18 PROCEDURE — 94799 UNLISTED PULMONARY SVC/PX: CPT

## 2019-02-18 PROCEDURE — 63510000001 EPOETIN ALFA PER 1000 UNITS: Performed by: INTERNAL MEDICINE

## 2019-02-18 PROCEDURE — 25010000002 HEPARIN (PORCINE) IN NACL 25000-0.45 UT/250ML-% SOLUTION

## 2019-02-18 PROCEDURE — 85610 PROTHROMBIN TIME: CPT | Performed by: INTERNAL MEDICINE

## 2019-02-18 PROCEDURE — 25010000002 HEPARIN (PORCINE) PER 1000 UNITS: Performed by: INTERNAL MEDICINE

## 2019-02-18 PROCEDURE — 25010000002 ONDANSETRON PER 1 MG: Performed by: NURSE PRACTITIONER

## 2019-02-18 PROCEDURE — 25010000002 ERTAPENEM 500MG/50 ML SOLUTION: Performed by: INTERNAL MEDICINE

## 2019-02-18 PROCEDURE — 63710000001 INSULIN DETEMIR PER 5 UNITS: Performed by: INTERNAL MEDICINE

## 2019-02-18 RX ORDER — ALBUMIN (HUMAN) 12.5 G/50ML
25 SOLUTION INTRAVENOUS AS NEEDED
Status: ACTIVE | OUTPATIENT
Start: 2019-02-18 | End: 2019-02-19

## 2019-02-18 RX ADMIN — ALBUMIN HUMAN 50 G: 0.25 SOLUTION INTRAVENOUS at 07:35

## 2019-02-18 RX ADMIN — Medication 1 TABLET: at 09:40

## 2019-02-18 RX ADMIN — ALBUMIN HUMAN 25 G: 0.25 SOLUTION INTRAVENOUS at 09:38

## 2019-02-18 RX ADMIN — HEPARIN SODIUM 13.5 UNITS/KG/HR: 10000 INJECTION, SOLUTION INTRAVENOUS at 09:09

## 2019-02-18 RX ADMIN — MULTIPLE VITAMINS W/ MINERALS TAB 1 TABLET: TAB ORAL at 09:40

## 2019-02-18 RX ADMIN — INSULIN LISPRO 3 UNITS: 100 INJECTION, SOLUTION INTRAVENOUS; SUBCUTANEOUS at 21:34

## 2019-02-18 RX ADMIN — BUDESONIDE AND FORMOTEROL FUMARATE DIHYDRATE 2 PUFF: 160; 4.5 AEROSOL RESPIRATORY (INHALATION) at 20:45

## 2019-02-18 RX ADMIN — CARVEDILOL 25 MG: 12.5 TABLET, FILM COATED ORAL at 17:04

## 2019-02-18 RX ADMIN — HYDRALAZINE HYDROCHLORIDE 100 MG: 50 TABLET, FILM COATED ORAL at 16:41

## 2019-02-18 RX ADMIN — INSULIN LISPRO 5 UNITS: 100 INJECTION, SOLUTION INTRAVENOUS; SUBCUTANEOUS at 08:40

## 2019-02-18 RX ADMIN — ISOSORBIDE MONONITRATE 30 MG: 30 TABLET, EXTENDED RELEASE ORAL at 09:40

## 2019-02-18 RX ADMIN — BUDESONIDE AND FORMOTEROL FUMARATE DIHYDRATE 2 PUFF: 160; 4.5 AEROSOL RESPIRATORY (INHALATION) at 09:39

## 2019-02-18 RX ADMIN — ONDANSETRON 4 MG: 2 INJECTION INTRAMUSCULAR; INTRAVENOUS at 01:45

## 2019-02-18 RX ADMIN — ERTAPENEM SODIUM 500 MG: 1 INJECTION, POWDER, LYOPHILIZED, FOR SOLUTION INTRAMUSCULAR; INTRAVENOUS at 16:42

## 2019-02-18 RX ADMIN — PANTOPRAZOLE SODIUM 40 MG: 40 TABLET, DELAYED RELEASE ORAL at 04:44

## 2019-02-18 RX ADMIN — IPRATROPIUM BROMIDE AND ALBUTEROL SULFATE 3 ML: 2.5; .5 SOLUTION RESPIRATORY (INHALATION) at 20:45

## 2019-02-18 RX ADMIN — HEPARIN SODIUM 13.5 UNITS/KG/HR: 10000 INJECTION, SOLUTION INTRAVENOUS at 22:30

## 2019-02-18 RX ADMIN — IPRATROPIUM BROMIDE AND ALBUTEROL SULFATE 3 ML: 2.5; .5 SOLUTION RESPIRATORY (INHALATION) at 12:43

## 2019-02-18 RX ADMIN — RENAGEL 800 MG: 800 TABLET ORAL at 12:40

## 2019-02-18 RX ADMIN — Medication 1 CAPSULE: at 09:40

## 2019-02-18 RX ADMIN — IPRATROPIUM BROMIDE AND ALBUTEROL SULFATE 3 ML: 2.5; .5 SOLUTION RESPIRATORY (INHALATION) at 09:30

## 2019-02-18 RX ADMIN — CETIRIZINE HYDROCHLORIDE 10 MG: 10 TABLET, FILM COATED ORAL at 09:40

## 2019-02-18 RX ADMIN — HYDRALAZINE HYDROCHLORIDE 100 MG: 50 TABLET, FILM COATED ORAL at 21:28

## 2019-02-18 RX ADMIN — CALCITRIOL 0.25 MCG: 0.25 CAPSULE ORAL at 09:40

## 2019-02-18 RX ADMIN — HEPARIN SODIUM 1800 UNITS/HR: 1000 INJECTION, SOLUTION INTRAVENOUS; SUBCUTANEOUS at 09:50

## 2019-02-18 RX ADMIN — WARFARIN SODIUM 10 MG: 5 TABLET ORAL at 17:04

## 2019-02-18 RX ADMIN — RENAGEL 800 MG: 800 TABLET ORAL at 17:04

## 2019-02-18 RX ADMIN — INSULIN LISPRO 5 UNITS: 100 INJECTION, SOLUTION INTRAVENOUS; SUBCUTANEOUS at 17:04

## 2019-02-18 RX ADMIN — BACITRACIN ZINC: 500 OINTMENT TOPICAL at 12:40

## 2019-02-18 RX ADMIN — SERTRALINE HYDROCHLORIDE 50 MG: 50 TABLET ORAL at 09:40

## 2019-02-18 RX ADMIN — INSULIN DETEMIR 6 UNITS: 100 INJECTION, SOLUTION SUBCUTANEOUS at 21:27

## 2019-02-18 RX ADMIN — AMLODIPINE BESYLATE 10 MG: 10 TABLET ORAL at 09:40

## 2019-02-18 RX ADMIN — ATORVASTATIN CALCIUM 10 MG: 10 TABLET, FILM COATED ORAL at 21:28

## 2019-02-18 RX ADMIN — INSULIN LISPRO 5 UNITS: 100 INJECTION, SOLUTION INTRAVENOUS; SUBCUTANEOUS at 12:40

## 2019-02-18 RX ADMIN — CALCIUM CARBONATE 2 TABLET: 500 TABLET ORAL at 03:45

## 2019-02-18 RX ADMIN — ACETAMINOPHEN 650 MG: 325 TABLET ORAL at 04:44

## 2019-02-18 RX ADMIN — INSULIN LISPRO 2 UNITS: 100 INJECTION, SOLUTION INTRAVENOUS; SUBCUTANEOUS at 17:05

## 2019-02-18 RX ADMIN — RENAGEL 800 MG: 800 TABLET ORAL at 08:40

## 2019-02-18 RX ADMIN — ERYTHROPOIETIN 10000 UNITS: 10000 INJECTION, SOLUTION INTRAVENOUS; SUBCUTANEOUS at 09:50

## 2019-02-18 RX ADMIN — BACITRACIN ZINC: 500 OINTMENT TOPICAL at 21:45

## 2019-02-18 RX ADMIN — CARVEDILOL 25 MG: 12.5 TABLET, FILM COATED ORAL at 09:40

## 2019-02-18 RX ADMIN — HEPARIN SODIUM 1800 UNITS/HR: 1000 INJECTION, SOLUTION INTRAVENOUS; SUBCUTANEOUS at 09:46

## 2019-02-19 LAB
GLUCOSE BLDC GLUCOMTR-MCNC: 103 MG/DL (ref 70–130)
GLUCOSE BLDC GLUCOMTR-MCNC: 124 MG/DL (ref 70–130)
GLUCOSE BLDC GLUCOMTR-MCNC: 160 MG/DL (ref 70–130)
GLUCOSE BLDC GLUCOMTR-MCNC: 189 MG/DL (ref 70–130)
INR PPP: 1.94 (ref 0.85–1.16)
PROTHROMBIN TIME: 21.3 SECONDS (ref 11.2–14.3)
UFH PPP CHRO-ACNC: 0.3 IU/ML (ref 0.3–0.7)

## 2019-02-19 PROCEDURE — 63710000001 INSULIN DETEMIR PER 5 UNITS: Performed by: INTERNAL MEDICINE

## 2019-02-19 PROCEDURE — 94799 UNLISTED PULMONARY SVC/PX: CPT

## 2019-02-19 PROCEDURE — 25010000002 HEPARIN (PORCINE) PER 1000 UNITS

## 2019-02-19 PROCEDURE — 82962 GLUCOSE BLOOD TEST: CPT

## 2019-02-19 PROCEDURE — 85520 HEPARIN ASSAY: CPT

## 2019-02-19 PROCEDURE — 25010000002 ONDANSETRON PER 1 MG: Performed by: NURSE PRACTITIONER

## 2019-02-19 PROCEDURE — 99232 SBSQ HOSP IP/OBS MODERATE 35: CPT | Performed by: PHYSICIAN ASSISTANT

## 2019-02-19 PROCEDURE — 85610 PROTHROMBIN TIME: CPT | Performed by: INTERNAL MEDICINE

## 2019-02-19 RX ORDER — WARFARIN SODIUM 7.5 MG/1
7.5 TABLET ORAL
Status: DISCONTINUED | OUTPATIENT
Start: 2019-02-19 | End: 2019-02-20 | Stop reason: HOSPADM

## 2019-02-19 RX ADMIN — HYDRALAZINE HYDROCHLORIDE 100 MG: 50 TABLET, FILM COATED ORAL at 15:16

## 2019-02-19 RX ADMIN — Medication 1 CAPSULE: at 08:51

## 2019-02-19 RX ADMIN — INSULIN LISPRO 5 UNITS: 100 INJECTION, SOLUTION INTRAVENOUS; SUBCUTANEOUS at 12:57

## 2019-02-19 RX ADMIN — SODIUM CHLORIDE, PRESERVATIVE FREE 3 ML: 5 INJECTION INTRAVENOUS at 08:53

## 2019-02-19 RX ADMIN — RENAGEL 800 MG: 800 TABLET ORAL at 12:59

## 2019-02-19 RX ADMIN — IPRATROPIUM BROMIDE AND ALBUTEROL SULFATE 3 ML: 2.5; .5 SOLUTION RESPIRATORY (INHALATION) at 20:20

## 2019-02-19 RX ADMIN — RENAGEL 800 MG: 800 TABLET ORAL at 08:52

## 2019-02-19 RX ADMIN — ACETAMINOPHEN 650 MG: 325 TABLET ORAL at 03:03

## 2019-02-19 RX ADMIN — HEPARIN SODIUM 13.5 UNITS/KG/HR: 10000 INJECTION, SOLUTION INTRAVENOUS at 13:01

## 2019-02-19 RX ADMIN — HYDRALAZINE HYDROCHLORIDE 100 MG: 50 TABLET, FILM COATED ORAL at 20:53

## 2019-02-19 RX ADMIN — WARFARIN SODIUM 7.5 MG: 7.5 TABLET ORAL at 17:27

## 2019-02-19 RX ADMIN — HYDRALAZINE HYDROCHLORIDE 100 MG: 50 TABLET, FILM COATED ORAL at 05:20

## 2019-02-19 RX ADMIN — INSULIN LISPRO 2 UNITS: 100 INJECTION, SOLUTION INTRAVENOUS; SUBCUTANEOUS at 12:59

## 2019-02-19 RX ADMIN — ATORVASTATIN CALCIUM 10 MG: 10 TABLET, FILM COATED ORAL at 20:53

## 2019-02-19 RX ADMIN — BACITRACIN ZINC: 500 OINTMENT TOPICAL at 08:51

## 2019-02-19 RX ADMIN — CARVEDILOL 25 MG: 12.5 TABLET, FILM COATED ORAL at 08:51

## 2019-02-19 RX ADMIN — IPRATROPIUM BROMIDE AND ALBUTEROL SULFATE 3 ML: 2.5; .5 SOLUTION RESPIRATORY (INHALATION) at 13:05

## 2019-02-19 RX ADMIN — PSYLLIUM HUSK 1 PACKET: 3.5 GRANULE ORAL at 08:51

## 2019-02-19 RX ADMIN — BUDESONIDE AND FORMOTEROL FUMARATE DIHYDRATE 2 PUFF: 160; 4.5 AEROSOL RESPIRATORY (INHALATION) at 08:55

## 2019-02-19 RX ADMIN — BUDESONIDE AND FORMOTEROL FUMARATE DIHYDRATE 2 PUFF: 160; 4.5 AEROSOL RESPIRATORY (INHALATION) at 20:20

## 2019-02-19 RX ADMIN — CETIRIZINE HYDROCHLORIDE 10 MG: 10 TABLET, FILM COATED ORAL at 08:51

## 2019-02-19 RX ADMIN — PANTOPRAZOLE SODIUM 40 MG: 40 TABLET, DELAYED RELEASE ORAL at 05:20

## 2019-02-19 RX ADMIN — PSYLLIUM HUSK 1 PACKET: 3.5 GRANULE ORAL at 17:26

## 2019-02-19 RX ADMIN — MULTIPLE VITAMINS W/ MINERALS TAB 1 TABLET: TAB ORAL at 08:51

## 2019-02-19 RX ADMIN — AMLODIPINE BESYLATE 10 MG: 10 TABLET ORAL at 09:05

## 2019-02-19 RX ADMIN — IPRATROPIUM BROMIDE AND ALBUTEROL SULFATE 3 ML: 2.5; .5 SOLUTION RESPIRATORY (INHALATION) at 15:51

## 2019-02-19 RX ADMIN — SERTRALINE HYDROCHLORIDE 50 MG: 50 TABLET ORAL at 08:51

## 2019-02-19 RX ADMIN — ISOSORBIDE MONONITRATE 30 MG: 30 TABLET, EXTENDED RELEASE ORAL at 08:51

## 2019-02-19 RX ADMIN — RENAGEL 800 MG: 800 TABLET ORAL at 17:27

## 2019-02-19 RX ADMIN — BACITRACIN ZINC: 500 OINTMENT TOPICAL at 20:53

## 2019-02-19 RX ADMIN — Medication 1 TABLET: at 08:51

## 2019-02-19 RX ADMIN — INSULIN LISPRO 5 UNITS: 100 INJECTION, SOLUTION INTRAVENOUS; SUBCUTANEOUS at 09:06

## 2019-02-19 RX ADMIN — CALCITRIOL 0.25 MCG: 0.25 CAPSULE ORAL at 08:51

## 2019-02-19 RX ADMIN — ONDANSETRON 4 MG: 2 INJECTION INTRAMUSCULAR; INTRAVENOUS at 09:10

## 2019-02-19 RX ADMIN — SODIUM CHLORIDE, PRESERVATIVE FREE 3 ML: 5 INJECTION INTRAVENOUS at 20:54

## 2019-02-19 RX ADMIN — IPRATROPIUM BROMIDE AND ALBUTEROL SULFATE 3 ML: 2.5; .5 SOLUTION RESPIRATORY (INHALATION) at 08:55

## 2019-02-19 RX ADMIN — INSULIN DETEMIR 6 UNITS: 100 INJECTION, SOLUTION SUBCUTANEOUS at 20:54

## 2019-02-19 RX ADMIN — INSULIN LISPRO 2 UNITS: 100 INJECTION, SOLUTION INTRAVENOUS; SUBCUTANEOUS at 09:06

## 2019-02-19 RX ADMIN — CARVEDILOL 25 MG: 12.5 TABLET, FILM COATED ORAL at 17:27

## 2019-02-20 ENCOUNTER — APPOINTMENT (OUTPATIENT)
Dept: NEPHROLOGY | Facility: HOSPITAL | Age: 69
End: 2019-02-20

## 2019-02-20 VITALS
HEART RATE: 79 BPM | RESPIRATION RATE: 16 BRPM | HEIGHT: 64 IN | OXYGEN SATURATION: 97 % | BODY MASS INDEX: 49.51 KG/M2 | TEMPERATURE: 97.3 F | SYSTOLIC BLOOD PRESSURE: 145 MMHG | DIASTOLIC BLOOD PRESSURE: 75 MMHG | WEIGHT: 290 LBS

## 2019-02-20 PROBLEM — Z79.4 TYPE 2 DIABETES MELLITUS WITH CHRONIC KIDNEY DISEASE ON CHRONIC DIALYSIS, WITH LONG-TERM CURRENT USE OF INSULIN (HCC): Status: ACTIVE | Noted: 2018-08-03

## 2019-02-20 PROBLEM — I82.622 ACUTE DEEP VEIN THROMBOSIS (DVT) OF BRACHIAL VEIN OF LEFT UPPER EXTREMITY (HCC): Status: ACTIVE | Noted: 2019-02-20

## 2019-02-20 PROBLEM — K59.01 SLOW TRANSIT CONSTIPATION: Status: ACTIVE | Noted: 2019-02-20

## 2019-02-20 PROBLEM — B96.89 URINARY TRACT INFECTION DUE TO EXTENDED-SPECTRUM BETA LACTAMASE (ESBL)-PRODUCING KLEBSIELLA: Status: ACTIVE | Noted: 2019-02-20

## 2019-02-20 PROBLEM — Z79.01 ANTICOAGULATED ON COUMADIN: Status: ACTIVE | Noted: 2019-02-20

## 2019-02-20 PROBLEM — E11.22 TYPE 2 DIABETES MELLITUS WITH CHRONIC KIDNEY DISEASE ON CHRONIC DIALYSIS, WITH LONG-TERM CURRENT USE OF INSULIN (HCC): Status: ACTIVE | Noted: 2018-08-03

## 2019-02-20 PROBLEM — J18.9 PNEUMONIA OF LEFT LOWER LOBE DUE TO INFECTIOUS ORGANISM: Status: RESOLVED | Noted: 2017-09-02 | Resolved: 2019-02-20

## 2019-02-20 PROBLEM — R79.1 SUBTHERAPEUTIC INTERNATIONAL NORMALIZED RATIO (INR): Status: ACTIVE | Noted: 2019-02-20

## 2019-02-20 PROBLEM — N18.6 TYPE 2 DIABETES MELLITUS WITH CHRONIC KIDNEY DISEASE ON CHRONIC DIALYSIS, WITH LONG-TERM CURRENT USE OF INSULIN (HCC): Status: ACTIVE | Noted: 2018-08-03

## 2019-02-20 PROBLEM — D72.829 LEUKOCYTOSIS: Status: RESOLVED | Noted: 2017-09-02 | Resolved: 2019-02-20

## 2019-02-20 PROBLEM — R79.1 SUBTHERAPEUTIC INTERNATIONAL NORMALIZED RATIO (INR): Status: RESOLVED | Noted: 2019-02-20 | Resolved: 2019-02-20

## 2019-02-20 PROBLEM — A41.9 SEPSIS (HCC): Status: RESOLVED | Noted: 2019-02-13 | Resolved: 2019-02-20

## 2019-02-20 PROBLEM — Z99.2 TYPE 2 DIABETES MELLITUS WITH CHRONIC KIDNEY DISEASE ON CHRONIC DIALYSIS, WITH LONG-TERM CURRENT USE OF INSULIN (HCC): Status: ACTIVE | Noted: 2018-08-03

## 2019-02-20 PROBLEM — L02.91 SOFT TISSUE ABSCESS: Status: ACTIVE | Noted: 2019-02-20

## 2019-02-20 PROBLEM — N39.0 URINARY TRACT INFECTION DUE TO EXTENDED-SPECTRUM BETA LACTAMASE (ESBL)-PRODUCING KLEBSIELLA: Status: ACTIVE | Noted: 2019-02-20

## 2019-02-20 PROBLEM — R10.9 ABDOMINAL PAIN: Status: RESOLVED | Noted: 2017-11-04 | Resolved: 2019-02-20

## 2019-02-20 LAB
ALBUMIN SERPL-MCNC: 3.5 G/DL (ref 3.2–4.8)
ANION GAP SERPL CALCULATED.3IONS-SCNC: 7 MMOL/L (ref 3–11)
BACTERIA SPEC ANAEROBE CULT: NORMAL
BUN BLD-MCNC: 56 MG/DL (ref 9–23)
BUN/CREAT SERPL: 11.8 (ref 7–25)
CALCIUM SPEC-SCNC: 9 MG/DL (ref 8.7–10.4)
CHLORIDE SERPL-SCNC: 104 MMOL/L (ref 99–109)
CO2 SERPL-SCNC: 25 MMOL/L (ref 20–31)
CREAT BLD-MCNC: 4.76 MG/DL (ref 0.6–1.3)
GFR SERPL CREATININE-BSD FRML MDRD: 11 ML/MIN/1.73
GLUCOSE BLD-MCNC: 133 MG/DL (ref 70–100)
GLUCOSE BLDC GLUCOMTR-MCNC: 114 MG/DL (ref 70–130)
GLUCOSE BLDC GLUCOMTR-MCNC: 172 MG/DL (ref 70–130)
GLUCOSE BLDC GLUCOMTR-MCNC: 182 MG/DL (ref 70–130)
INR PPP: 2.28 (ref 0.85–1.16)
PHOSPHATE SERPL-MCNC: 4.8 MG/DL (ref 2.4–5.1)
POTASSIUM BLD-SCNC: 4.8 MMOL/L (ref 3.5–5.5)
PROTHROMBIN TIME: 24.2 SECONDS (ref 11.2–14.3)
SODIUM BLD-SCNC: 136 MMOL/L (ref 132–146)
UFH PPP CHRO-ACNC: 0.34 IU/ML (ref 0.3–0.7)

## 2019-02-20 PROCEDURE — 25010000002 HEPARIN (PORCINE) PER 1000 UNITS: Performed by: INTERNAL MEDICINE

## 2019-02-20 PROCEDURE — 94799 UNLISTED PULMONARY SVC/PX: CPT

## 2019-02-20 PROCEDURE — 85610 PROTHROMBIN TIME: CPT | Performed by: INTERNAL MEDICINE

## 2019-02-20 PROCEDURE — 99239 HOSP IP/OBS DSCHRG MGMT >30: CPT | Performed by: PHYSICIAN ASSISTANT

## 2019-02-20 PROCEDURE — 82962 GLUCOSE BLOOD TEST: CPT

## 2019-02-20 PROCEDURE — 80069 RENAL FUNCTION PANEL: CPT | Performed by: INTERNAL MEDICINE

## 2019-02-20 PROCEDURE — 63510000001 EPOETIN ALFA PER 1000 UNITS: Performed by: INTERNAL MEDICINE

## 2019-02-20 PROCEDURE — 85520 HEPARIN ASSAY: CPT

## 2019-02-20 PROCEDURE — 25010000002 HEPARIN (PORCINE) PER 1000 UNITS

## 2019-02-20 PROCEDURE — 25010000002 ONDANSETRON PER 1 MG: Performed by: NURSE PRACTITIONER

## 2019-02-20 RX ORDER — ALBUMIN (HUMAN) 12.5 G/50ML
12.5 SOLUTION INTRAVENOUS AS NEEDED
Status: DISCONTINUED | OUTPATIENT
Start: 2019-02-20 | End: 2019-02-20 | Stop reason: HOSPADM

## 2019-02-20 RX ORDER — HYDRALAZINE HYDROCHLORIDE 100 MG/1
100 TABLET, FILM COATED ORAL EVERY 8 HOURS
Qty: 90 TABLET | Refills: 0
Start: 2019-02-20 | End: 2019-04-30 | Stop reason: HOSPADM

## 2019-02-20 RX ORDER — DOCUSATE SODIUM 100 MG/1
100 CAPSULE, LIQUID FILLED ORAL 2 TIMES DAILY
Start: 2019-02-20 | End: 2021-02-17 | Stop reason: HOSPADM

## 2019-02-20 RX ORDER — SENNA AND DOCUSATE SODIUM 50; 8.6 MG/1; MG/1
1 TABLET, FILM COATED ORAL 2 TIMES DAILY PRN
Start: 2019-02-20 | End: 2019-04-27

## 2019-02-20 RX ORDER — DIAPER,BRIEF,INFANT-TODD,DISP
EACH MISCELLANEOUS EVERY 12 HOURS SCHEDULED
Start: 2019-02-20 | End: 2019-04-05

## 2019-02-20 RX ORDER — WARFARIN SODIUM 7.5 MG/1
7.5 TABLET ORAL NIGHTLY
Start: 2019-02-20 | End: 2019-04-05

## 2019-02-20 RX ORDER — ALBUMIN (HUMAN) 12.5 G/50ML
25 SOLUTION INTRAVENOUS AS NEEDED
Status: DISCONTINUED | OUTPATIENT
Start: 2019-02-20 | End: 2019-02-20 | Stop reason: HOSPADM

## 2019-02-20 RX ADMIN — WARFARIN SODIUM 7.5 MG: 7.5 TABLET ORAL at 17:22

## 2019-02-20 RX ADMIN — RENAGEL 800 MG: 800 TABLET ORAL at 17:23

## 2019-02-20 RX ADMIN — IPRATROPIUM BROMIDE AND ALBUTEROL SULFATE 3 ML: 2.5; .5 SOLUTION RESPIRATORY (INHALATION) at 08:08

## 2019-02-20 RX ADMIN — CALCITRIOL 0.25 MCG: 0.25 CAPSULE ORAL at 09:33

## 2019-02-20 RX ADMIN — RENAGEL 800 MG: 800 TABLET ORAL at 09:32

## 2019-02-20 RX ADMIN — PSYLLIUM HUSK 1 PACKET: 3.5 GRANULE ORAL at 09:34

## 2019-02-20 RX ADMIN — SERTRALINE HYDROCHLORIDE 50 MG: 50 TABLET ORAL at 09:34

## 2019-02-20 RX ADMIN — ERYTHROPOIETIN 10000 UNITS: 10000 INJECTION, SOLUTION INTRAVENOUS; SUBCUTANEOUS at 09:37

## 2019-02-20 RX ADMIN — IPRATROPIUM BROMIDE AND ALBUTEROL SULFATE 3 ML: 2.5; .5 SOLUTION RESPIRATORY (INHALATION) at 16:02

## 2019-02-20 RX ADMIN — INSULIN LISPRO 2 UNITS: 100 INJECTION, SOLUTION INTRAVENOUS; SUBCUTANEOUS at 12:13

## 2019-02-20 RX ADMIN — RENAGEL 800 MG: 800 TABLET ORAL at 12:13

## 2019-02-20 RX ADMIN — MULTIPLE VITAMINS W/ MINERALS TAB 1 TABLET: TAB ORAL at 09:33

## 2019-02-20 RX ADMIN — BUDESONIDE AND FORMOTEROL FUMARATE DIHYDRATE 2 PUFF: 160; 4.5 AEROSOL RESPIRATORY (INHALATION) at 08:08

## 2019-02-20 RX ADMIN — HYDRALAZINE HYDROCHLORIDE 100 MG: 50 TABLET, FILM COATED ORAL at 13:56

## 2019-02-20 RX ADMIN — PANTOPRAZOLE SODIUM 40 MG: 40 TABLET, DELAYED RELEASE ORAL at 09:33

## 2019-02-20 RX ADMIN — HEPARIN SODIUM 1800 UNITS/HR: 1000 INJECTION, SOLUTION INTRAVENOUS; SUBCUTANEOUS at 06:29

## 2019-02-20 RX ADMIN — INSULIN LISPRO 2 UNITS: 100 INJECTION, SOLUTION INTRAVENOUS; SUBCUTANEOUS at 17:23

## 2019-02-20 RX ADMIN — HYDRALAZINE HYDROCHLORIDE 100 MG: 50 TABLET, FILM COATED ORAL at 10:13

## 2019-02-20 RX ADMIN — SODIUM CHLORIDE, PRESERVATIVE FREE 3 ML: 5 INJECTION INTRAVENOUS at 10:15

## 2019-02-20 RX ADMIN — ONDANSETRON 4 MG: 2 INJECTION INTRAMUSCULAR; INTRAVENOUS at 06:24

## 2019-02-20 RX ADMIN — HEPARIN SODIUM 13.5 UNITS/KG/HR: 10000 INJECTION, SOLUTION INTRAVENOUS at 03:25

## 2019-02-20 RX ADMIN — CETIRIZINE HYDROCHLORIDE 10 MG: 10 TABLET, FILM COATED ORAL at 09:33

## 2019-02-20 RX ADMIN — BACITRACIN ZINC: 500 OINTMENT TOPICAL at 11:56

## 2019-02-20 RX ADMIN — AMLODIPINE BESYLATE 10 MG: 10 TABLET ORAL at 10:14

## 2019-02-20 RX ADMIN — CARVEDILOL 25 MG: 12.5 TABLET, FILM COATED ORAL at 17:22

## 2019-02-20 RX ADMIN — Medication 1 CAPSULE: at 09:33

## 2019-02-23 ENCOUNTER — LAB REQUISITION (OUTPATIENT)
Dept: LAB | Facility: HOSPITAL | Age: 69
End: 2019-02-23

## 2019-02-23 DIAGNOSIS — Z00.00 ROUTINE GENERAL MEDICAL EXAMINATION AT A HEALTH CARE FACILITY: ICD-10-CM

## 2019-02-23 DIAGNOSIS — R79.89 OTHER SPECIFIED ABNORMAL FINDINGS OF BLOOD CHEMISTRY: ICD-10-CM

## 2019-02-23 LAB
INR PPP: 4.67 (ref 0.85–1.16)
PROTHROMBIN TIME: 42.3 SECONDS (ref 11.2–14.3)

## 2019-02-23 PROCEDURE — 85610 PROTHROMBIN TIME: CPT

## 2019-02-24 ENCOUNTER — LAB REQUISITION (OUTPATIENT)
Dept: LAB | Facility: HOSPITAL | Age: 69
End: 2019-02-24

## 2019-02-24 DIAGNOSIS — Z00.00 ROUTINE GENERAL MEDICAL EXAMINATION AT A HEALTH CARE FACILITY: ICD-10-CM

## 2019-02-24 LAB
INR PPP: 5 (ref 0.85–1.16)
PROTHROMBIN TIME: 44.8 SECONDS (ref 11.2–14.3)

## 2019-02-24 PROCEDURE — 85610 PROTHROMBIN TIME: CPT

## 2019-03-08 ENCOUNTER — TRANSCRIBE ORDERS (OUTPATIENT)
Dept: ADMINISTRATIVE | Facility: HOSPITAL | Age: 69
End: 2019-03-08

## 2019-04-05 ENCOUNTER — HOSPITAL ENCOUNTER (OUTPATIENT)
Facility: HOSPITAL | Age: 69
Setting detail: OBSERVATION
Discharge: NURSING, REHAB OR LONG-TERM CARE | End: 2019-04-08
Attending: EMERGENCY MEDICINE | Admitting: FAMILY MEDICINE

## 2019-04-05 ENCOUNTER — APPOINTMENT (OUTPATIENT)
Dept: GENERAL RADIOLOGY | Facility: HOSPITAL | Age: 69
End: 2019-04-05

## 2019-04-05 ENCOUNTER — APPOINTMENT (OUTPATIENT)
Dept: NEPHROLOGY | Facility: HOSPITAL | Age: 69
End: 2019-04-05

## 2019-04-05 DIAGNOSIS — Z79.01 CHRONIC ANTICOAGULATION: ICD-10-CM

## 2019-04-05 DIAGNOSIS — N18.6 CHRONIC KIDNEY DISEASE WITH END STAGE RENAL FAILURE ON DIALYSIS (HCC): ICD-10-CM

## 2019-04-05 DIAGNOSIS — J06.9 UPPER RESPIRATORY TRACT INFECTION, UNSPECIFIED TYPE: ICD-10-CM

## 2019-04-05 DIAGNOSIS — R06.02 SHORTNESS OF BREATH: Primary | ICD-10-CM

## 2019-04-05 DIAGNOSIS — L02.213 ABSCESS OF CHEST WALL: ICD-10-CM

## 2019-04-05 DIAGNOSIS — Z99.2 CHRONIC KIDNEY DISEASE WITH END STAGE RENAL FAILURE ON DIALYSIS (HCC): ICD-10-CM

## 2019-04-05 PROBLEM — R79.1 SUBTHERAPEUTIC INTERNATIONAL NORMALIZED RATIO (INR): Status: ACTIVE | Noted: 2019-04-05

## 2019-04-05 LAB
ALBUMIN SERPL-MCNC: 3.54 G/DL (ref 3.2–4.8)
ALBUMIN/GLOB SERPL: 1.1 G/DL (ref 1.5–2.5)
ALP SERPL-CCNC: 89 U/L (ref 25–100)
ALT SERPL W P-5'-P-CCNC: 13 U/L (ref 7–40)
ANION GAP SERPL CALCULATED.3IONS-SCNC: 10 MMOL/L (ref 3–11)
APTT PPP: 39.4 SECONDS (ref 24–37)
AST SERPL-CCNC: 11 U/L (ref 0–33)
BASOPHILS # BLD AUTO: 0.03 10*3/MM3 (ref 0–0.2)
BASOPHILS NFR BLD AUTO: 0.3 % (ref 0–1)
BILIRUB SERPL-MCNC: 0.3 MG/DL (ref 0.3–1.2)
BNP SERPL-MCNC: 113 PG/ML (ref 0–100)
BUN BLD-MCNC: 78 MG/DL (ref 9–23)
BUN/CREAT SERPL: 17.4 (ref 7–25)
CALCIUM SPEC-SCNC: 9.1 MG/DL (ref 8.7–10.4)
CHLORIDE SERPL-SCNC: 105 MMOL/L (ref 99–109)
CO2 SERPL-SCNC: 23 MMOL/L (ref 20–31)
CREAT BLD-MCNC: 4.49 MG/DL (ref 0.6–1.3)
D-LACTATE SERPL-SCNC: 0.9 MMOL/L (ref 0.5–2)
DEPRECATED RDW RBC AUTO: 63.2 FL (ref 37–54)
EOSINOPHIL # BLD AUTO: 0.35 10*3/MM3 (ref 0–0.3)
EOSINOPHIL NFR BLD AUTO: 3 % (ref 0–3)
ERYTHROCYTE [DISTWIDTH] IN BLOOD BY AUTOMATED COUNT: 18.3 % (ref 11.3–14.5)
FLUAV SUBTYP SPEC NAA+PROBE: NOT DETECTED
FLUBV RNA ISLT QL NAA+PROBE: NOT DETECTED
GFR SERPL CREATININE-BSD FRML MDRD: 12 ML/MIN/1.73
GLOBULIN UR ELPH-MCNC: 3.4 GM/DL
GLUCOSE BLD-MCNC: 153 MG/DL (ref 70–100)
HCT VFR BLD AUTO: 37.7 % (ref 34.5–44)
HGB BLD-MCNC: 11.4 G/DL (ref 11.5–15.5)
HOLD SPECIMEN: NORMAL
HOLD SPECIMEN: NORMAL
IMM GRANULOCYTES # BLD AUTO: 0.08 10*3/MM3 (ref 0–0.05)
IMM GRANULOCYTES NFR BLD AUTO: 0.7 % (ref 0–0.6)
INR PPP: 1.37 (ref 0.85–1.16)
INR PPP: 1.46 (ref 0.85–1.16)
LYMPHOCYTES # BLD AUTO: 1.94 10*3/MM3 (ref 0.6–4.8)
LYMPHOCYTES NFR BLD AUTO: 16.6 % (ref 24–44)
MCH RBC QN AUTO: 28.7 PG (ref 27–31)
MCHC RBC AUTO-ENTMCNC: 30.2 G/DL (ref 32–36)
MCV RBC AUTO: 95 FL (ref 80–99)
MONOCYTES # BLD AUTO: 0.59 10*3/MM3 (ref 0–1)
MONOCYTES NFR BLD AUTO: 5.1 % (ref 0–12)
NEUTROPHILS # BLD AUTO: 8.75 10*3/MM3 (ref 1.5–8.3)
NEUTROPHILS NFR BLD AUTO: 75 % (ref 41–71)
PLATELET # BLD AUTO: 289 10*3/MM3 (ref 150–450)
PMV BLD AUTO: 10.7 FL (ref 6–12)
POTASSIUM BLD-SCNC: 5.1 MMOL/L (ref 3.5–5.5)
PROT SERPL-MCNC: 6.9 G/DL (ref 5.7–8.2)
PROTHROMBIN TIME: 16.3 SECONDS (ref 11.2–14.3)
PROTHROMBIN TIME: 17.1 SECONDS (ref 11.2–14.3)
RBC # BLD AUTO: 3.97 10*6/MM3 (ref 3.89–5.14)
SODIUM BLD-SCNC: 138 MMOL/L (ref 132–146)
TROPONIN I SERPL-MCNC: 0 NG/ML (ref 0–0.07)
TROPONIN I SERPL-MCNC: 0 NG/ML (ref 0–0.07)
UFH PPP CHRO-ACNC: 0.1 IU/ML (ref 0.3–0.7)
UFH PPP CHRO-ACNC: 0.43 IU/ML (ref 0.3–0.7)
WBC NRBC COR # BLD: 11.66 10*3/MM3 (ref 3.5–10.8)
WHOLE BLOOD HOLD SPECIMEN: NORMAL
WHOLE BLOOD HOLD SPECIMEN: NORMAL

## 2019-04-05 PROCEDURE — 83880 ASSAY OF NATRIURETIC PEPTIDE: CPT | Performed by: EMERGENCY MEDICINE

## 2019-04-05 PROCEDURE — 87502 INFLUENZA DNA AMP PROBE: CPT | Performed by: EMERGENCY MEDICINE

## 2019-04-05 PROCEDURE — 94799 UNLISTED PULMONARY SVC/PX: CPT

## 2019-04-05 PROCEDURE — 83605 ASSAY OF LACTIC ACID: CPT | Performed by: EMERGENCY MEDICINE

## 2019-04-05 PROCEDURE — G0257 UNSCHED DIALYSIS ESRD PT HOS: HCPCS

## 2019-04-05 PROCEDURE — 87070 CULTURE OTHR SPECIMN AEROBIC: CPT | Performed by: EMERGENCY MEDICINE

## 2019-04-05 PROCEDURE — 25010000002 ALBUMIN HUMAN 25% PER 50 ML: Performed by: INTERNAL MEDICINE

## 2019-04-05 PROCEDURE — 94640 AIRWAY INHALATION TREATMENT: CPT

## 2019-04-05 PROCEDURE — 85025 COMPLETE CBC W/AUTO DIFF WBC: CPT | Performed by: EMERGENCY MEDICINE

## 2019-04-05 PROCEDURE — 85610 PROTHROMBIN TIME: CPT | Performed by: EMERGENCY MEDICINE

## 2019-04-05 PROCEDURE — G0378 HOSPITAL OBSERVATION PER HR: HCPCS

## 2019-04-05 PROCEDURE — 25010000002 HEPARIN (PORCINE) IN NACL 25000-0.45 UT/250ML-% SOLUTION

## 2019-04-05 PROCEDURE — P9047 ALBUMIN (HUMAN), 25%, 50ML: HCPCS | Performed by: INTERNAL MEDICINE

## 2019-04-05 PROCEDURE — 99220 PR INITIAL OBSERVATION CARE/DAY 70 MINUTES: CPT | Performed by: HOSPITALIST

## 2019-04-05 PROCEDURE — 85610 PROTHROMBIN TIME: CPT

## 2019-04-05 PROCEDURE — 96365 THER/PROPH/DIAG IV INF INIT: CPT

## 2019-04-05 PROCEDURE — 71045 X-RAY EXAM CHEST 1 VIEW: CPT

## 2019-04-05 PROCEDURE — 96376 TX/PRO/DX INJ SAME DRUG ADON: CPT

## 2019-04-05 PROCEDURE — 84484 ASSAY OF TROPONIN QUANT: CPT

## 2019-04-05 PROCEDURE — 85520 HEPARIN ASSAY: CPT

## 2019-04-05 PROCEDURE — 85730 THROMBOPLASTIN TIME PARTIAL: CPT | Performed by: EMERGENCY MEDICINE

## 2019-04-05 PROCEDURE — 99285 EMERGENCY DEPT VISIT HI MDM: CPT

## 2019-04-05 PROCEDURE — 87205 SMEAR GRAM STAIN: CPT | Performed by: EMERGENCY MEDICINE

## 2019-04-05 PROCEDURE — 87077 CULTURE AEROBIC IDENTIFY: CPT | Performed by: EMERGENCY MEDICINE

## 2019-04-05 PROCEDURE — 93005 ELECTROCARDIOGRAM TRACING: CPT | Performed by: EMERGENCY MEDICINE

## 2019-04-05 PROCEDURE — 80053 COMPREHEN METABOLIC PANEL: CPT | Performed by: EMERGENCY MEDICINE

## 2019-04-05 PROCEDURE — 87186 SC STD MICRODIL/AGAR DIL: CPT | Performed by: EMERGENCY MEDICINE

## 2019-04-05 PROCEDURE — 96366 THER/PROPH/DIAG IV INF ADDON: CPT

## 2019-04-05 PROCEDURE — 25010000002 HEPARIN (PORCINE) PER 1000 UNITS

## 2019-04-05 RX ORDER — ONDANSETRON 2 MG/ML
4 INJECTION INTRAMUSCULAR; INTRAVENOUS EVERY 6 HOURS PRN
Status: DISCONTINUED | OUTPATIENT
Start: 2019-04-05 | End: 2019-04-08 | Stop reason: HOSPADM

## 2019-04-05 RX ORDER — SACCHAROMYCES BOULARDII 250 MG
250 CAPSULE ORAL 2 TIMES DAILY
Status: DISCONTINUED | OUTPATIENT
Start: 2019-04-05 | End: 2019-04-08 | Stop reason: HOSPADM

## 2019-04-05 RX ORDER — CARVEDILOL 12.5 MG/1
25 TABLET ORAL 2 TIMES DAILY WITH MEALS
Status: DISCONTINUED | OUTPATIENT
Start: 2019-04-05 | End: 2019-04-08 | Stop reason: HOSPADM

## 2019-04-05 RX ORDER — WARFARIN SODIUM 4 MG/1
8 TABLET ORAL
COMMUNITY
End: 2019-04-30 | Stop reason: HOSPADM

## 2019-04-05 RX ORDER — ONDANSETRON 4 MG/1
4 TABLET, FILM COATED ORAL EVERY 6 HOURS PRN
Status: DISCONTINUED | OUTPATIENT
Start: 2019-04-05 | End: 2019-04-08 | Stop reason: HOSPADM

## 2019-04-05 RX ORDER — ISOSORBIDE MONONITRATE 30 MG/1
30 TABLET, EXTENDED RELEASE ORAL DAILY
Status: DISCONTINUED | OUTPATIENT
Start: 2019-04-05 | End: 2019-04-08 | Stop reason: HOSPADM

## 2019-04-05 RX ORDER — WARFARIN SODIUM 4 MG/1
8 TABLET ORAL
Status: DISCONTINUED | OUTPATIENT
Start: 2019-04-05 | End: 2019-04-08 | Stop reason: HOSPADM

## 2019-04-05 RX ORDER — IPRATROPIUM BROMIDE AND ALBUTEROL SULFATE 2.5; .5 MG/3ML; MG/3ML
3 SOLUTION RESPIRATORY (INHALATION) EVERY 6 HOURS PRN
COMMUNITY
End: 2021-02-17 | Stop reason: HOSPADM

## 2019-04-05 RX ORDER — BUPIVACAINE HYDROCHLORIDE AND EPINEPHRINE 5; 5 MG/ML; UG/ML
10 INJECTION, SOLUTION EPIDURAL; INTRACAUDAL; PERINEURAL ONCE
Status: COMPLETED | OUTPATIENT
Start: 2019-04-05 | End: 2019-04-05

## 2019-04-05 RX ORDER — AMOXICILLIN AND CLAVULANATE POTASSIUM 500; 125 MG/1; MG/1
1 TABLET, FILM COATED ORAL
Status: DISCONTINUED | OUTPATIENT
Start: 2019-04-05 | End: 2019-04-08 | Stop reason: HOSPADM

## 2019-04-05 RX ORDER — CALCITRIOL 0.25 UG/1
0.25 CAPSULE, LIQUID FILLED ORAL DAILY
Status: DISCONTINUED | OUTPATIENT
Start: 2019-04-06 | End: 2019-04-08 | Stop reason: HOSPADM

## 2019-04-05 RX ORDER — SODIUM CHLORIDE 0.9 % (FLUSH) 0.9 %
3 SYRINGE (ML) INJECTION EVERY 12 HOURS SCHEDULED
Status: DISCONTINUED | OUTPATIENT
Start: 2019-04-05 | End: 2019-04-08 | Stop reason: HOSPADM

## 2019-04-05 RX ORDER — ONDANSETRON 4 MG/1
4 TABLET, FILM COATED ORAL EVERY 8 HOURS PRN
COMMUNITY
End: 2020-10-26

## 2019-04-05 RX ORDER — HEPARIN SODIUM 1000 [USP'U]/ML
8000 INJECTION, SOLUTION INTRAVENOUS; SUBCUTANEOUS ONCE
Status: COMPLETED | OUTPATIENT
Start: 2019-04-05 | End: 2019-04-05

## 2019-04-05 RX ORDER — CLINDAMYCIN HYDROCHLORIDE 300 MG/1
300 CAPSULE ORAL 3 TIMES DAILY
COMMUNITY
Start: 2019-04-03 | End: 2019-04-08 | Stop reason: HOSPADM

## 2019-04-05 RX ORDER — ALBUMIN (HUMAN) 12.5 G/50ML
25 SOLUTION INTRAVENOUS AS NEEDED
Status: DISPENSED | OUTPATIENT
Start: 2019-04-05 | End: 2019-04-06

## 2019-04-05 RX ORDER — HEPARIN SODIUM 10000 [USP'U]/100ML
11.3 INJECTION, SOLUTION INTRAVENOUS CONTINUOUS
Status: DISCONTINUED | OUTPATIENT
Start: 2019-04-05 | End: 2019-04-05

## 2019-04-05 RX ORDER — ACETAMINOPHEN 325 MG/1
650 TABLET ORAL EVERY 4 HOURS PRN
Status: DISCONTINUED | OUTPATIENT
Start: 2019-04-05 | End: 2019-04-08 | Stop reason: HOSPADM

## 2019-04-05 RX ORDER — SIMETHICONE 80 MG
80 TABLET,CHEWABLE ORAL EVERY 6 HOURS
Status: DISCONTINUED | OUTPATIENT
Start: 2019-04-05 | End: 2019-04-08 | Stop reason: HOSPADM

## 2019-04-05 RX ORDER — WARFARIN SODIUM 7.5 MG/1
8 TABLET ORAL
Status: DISCONTINUED | OUTPATIENT
Start: 2019-04-05 | End: 2019-04-05

## 2019-04-05 RX ORDER — ATORVASTATIN CALCIUM 10 MG/1
10 TABLET, FILM COATED ORAL NIGHTLY
Status: DISCONTINUED | OUTPATIENT
Start: 2019-04-05 | End: 2019-04-08 | Stop reason: HOSPADM

## 2019-04-05 RX ORDER — HYDRALAZINE HYDROCHLORIDE 25 MG/1
100 TABLET, FILM COATED ORAL EVERY 8 HOURS SCHEDULED
Status: DISCONTINUED | OUTPATIENT
Start: 2019-04-05 | End: 2019-04-08 | Stop reason: HOSPADM

## 2019-04-05 RX ORDER — CHOLECALCIFEROL (VITAMIN D3) 125 MCG
5 CAPSULE ORAL NIGHTLY
Status: DISCONTINUED | OUTPATIENT
Start: 2019-04-05 | End: 2019-04-08 | Stop reason: HOSPADM

## 2019-04-05 RX ORDER — SODIUM CHLORIDE 0.9 % (FLUSH) 0.9 %
10 SYRINGE (ML) INJECTION AS NEEDED
Status: DISCONTINUED | OUTPATIENT
Start: 2019-04-05 | End: 2019-04-08 | Stop reason: HOSPADM

## 2019-04-05 RX ORDER — LANOLIN ALCOHOL/MO/W.PET/CERES
3 CREAM (GRAM) TOPICAL NIGHTLY
COMMUNITY
End: 2021-02-17 | Stop reason: HOSPADM

## 2019-04-05 RX ORDER — SEVELAMER CARBONATE FOR ORAL SUSPENSION 800 MG/1
800 POWDER, FOR SUSPENSION ORAL
Status: DISCONTINUED | OUTPATIENT
Start: 2019-04-05 | End: 2019-04-08 | Stop reason: HOSPADM

## 2019-04-05 RX ORDER — AMLODIPINE BESYLATE 10 MG/1
10 TABLET ORAL DAILY
Status: DISCONTINUED | OUTPATIENT
Start: 2019-04-06 | End: 2019-04-08 | Stop reason: HOSPADM

## 2019-04-05 RX ORDER — BUDESONIDE AND FORMOTEROL FUMARATE DIHYDRATE 160; 4.5 UG/1; UG/1
2 AEROSOL RESPIRATORY (INHALATION)
Status: DISCONTINUED | OUTPATIENT
Start: 2019-04-05 | End: 2019-04-08 | Stop reason: HOSPADM

## 2019-04-05 RX ORDER — PANTOPRAZOLE SODIUM 40 MG/1
40 TABLET, DELAYED RELEASE ORAL EVERY MORNING
Status: DISCONTINUED | OUTPATIENT
Start: 2019-04-05 | End: 2019-04-08 | Stop reason: HOSPADM

## 2019-04-05 RX ORDER — IPRATROPIUM BROMIDE AND ALBUTEROL SULFATE 2.5; .5 MG/3ML; MG/3ML
3 SOLUTION RESPIRATORY (INHALATION) EVERY 6 HOURS PRN
Status: DISCONTINUED | OUTPATIENT
Start: 2019-04-05 | End: 2019-04-08 | Stop reason: HOSPADM

## 2019-04-05 RX ORDER — NICOTINE POLACRILEX 4 MG
15 LOZENGE BUCCAL
COMMUNITY
End: 2019-08-01 | Stop reason: HOSPADM

## 2019-04-05 RX ORDER — SODIUM CHLORIDE 0.9 % (FLUSH) 0.9 %
3-10 SYRINGE (ML) INJECTION AS NEEDED
Status: DISCONTINUED | OUTPATIENT
Start: 2019-04-05 | End: 2019-04-08 | Stop reason: HOSPADM

## 2019-04-05 RX ORDER — MULTIVITAMIN WITH FOLIC ACID 400 MCG
1 TABLET ORAL DAILY
COMMUNITY
End: 2021-02-17 | Stop reason: HOSPADM

## 2019-04-05 RX ORDER — DOXYCYCLINE 100 MG/1
100 CAPSULE ORAL EVERY 12 HOURS SCHEDULED
Status: DISCONTINUED | OUTPATIENT
Start: 2019-04-05 | End: 2019-04-08 | Stop reason: HOSPADM

## 2019-04-05 RX ORDER — CETIRIZINE HYDROCHLORIDE 10 MG/1
10 TABLET ORAL DAILY
Status: DISCONTINUED | OUTPATIENT
Start: 2019-04-06 | End: 2019-04-08 | Stop reason: HOSPADM

## 2019-04-05 RX ORDER — IPRATROPIUM BROMIDE AND ALBUTEROL SULFATE 2.5; .5 MG/3ML; MG/3ML
3 SOLUTION RESPIRATORY (INHALATION) ONCE
Status: COMPLETED | OUTPATIENT
Start: 2019-04-05 | End: 2019-04-05

## 2019-04-05 RX ORDER — DOCUSATE SODIUM 100 MG/1
100 CAPSULE, LIQUID FILLED ORAL 2 TIMES DAILY
Status: DISCONTINUED | OUTPATIENT
Start: 2019-04-05 | End: 2019-04-08 | Stop reason: HOSPADM

## 2019-04-05 RX ADMIN — CARVEDILOL 25 MG: 12.5 TABLET, FILM COATED ORAL at 18:40

## 2019-04-05 RX ADMIN — HYDRALAZINE HYDROCHLORIDE 100 MG: 25 TABLET ORAL at 20:59

## 2019-04-05 RX ADMIN — MELATONIN TAB 5 MG 5 MG: 5 TAB at 20:58

## 2019-04-05 RX ADMIN — ISOSORBIDE MONONITRATE 30 MG: 30 TABLET, EXTENDED RELEASE ORAL at 16:50

## 2019-04-05 RX ADMIN — PANTOPRAZOLE SODIUM 40 MG: 40 TABLET, DELAYED RELEASE ORAL at 16:50

## 2019-04-05 RX ADMIN — HYDRALAZINE HYDROCHLORIDE 100 MG: 25 TABLET ORAL at 16:50

## 2019-04-05 RX ADMIN — HEPARIN SODIUM 8000 UNITS: 1000 INJECTION, SOLUTION INTRAVENOUS; SUBCUTANEOUS at 16:51

## 2019-04-05 RX ADMIN — SEVELAMER CARBONATE 0.8 G: 800 POWDER, FOR SUSPENSION ORAL at 18:40

## 2019-04-05 RX ADMIN — SIMETHICONE CHEW TAB 80 MG 80 MG: 80 TABLET ORAL at 18:40

## 2019-04-05 RX ADMIN — DOXYCYCLINE 100 MG: 100 CAPSULE ORAL at 20:58

## 2019-04-05 RX ADMIN — AMOXICILLIN AND CLAVULANATE POTASSIUM 500 MG: 500; 125 TABLET, FILM COATED ORAL at 22:35

## 2019-04-05 RX ADMIN — Medication 250 MG: at 20:58

## 2019-04-05 RX ADMIN — ALBUMIN HUMAN 50 G: 0.25 SOLUTION INTRAVENOUS at 13:46

## 2019-04-05 RX ADMIN — WARFARIN SODIUM 8 MG: 4 TABLET ORAL at 18:40

## 2019-04-05 RX ADMIN — SODIUM CHLORIDE, PRESERVATIVE FREE 3 ML: 5 INJECTION INTRAVENOUS at 20:59

## 2019-04-05 RX ADMIN — ATORVASTATIN CALCIUM 10 MG: 10 TABLET, FILM COATED ORAL at 20:58

## 2019-04-05 RX ADMIN — BUPIVACAINE HYDROCHLORIDE AND EPINEPHRINE BITARTRATE 10 ML: 5; .005 INJECTION, SOLUTION EPIDURAL; INTRACAUDAL; PERINEURAL at 10:35

## 2019-04-05 RX ADMIN — IPRATROPIUM BROMIDE AND ALBUTEROL SULFATE 3 ML: 2.5; .5 SOLUTION RESPIRATORY (INHALATION) at 07:02

## 2019-04-05 RX ADMIN — IPRATROPIUM BROMIDE AND ALBUTEROL SULFATE 3 ML: 2.5; .5 SOLUTION RESPIRATORY (INHALATION) at 23:21

## 2019-04-05 RX ADMIN — BUDESONIDE AND FORMOTEROL FUMARATE DIHYDRATE 2 PUFF: 160; 4.5 AEROSOL RESPIRATORY (INHALATION) at 20:39

## 2019-04-05 RX ADMIN — SERTRALINE HYDROCHLORIDE 50 MG: 50 TABLET ORAL at 16:50

## 2019-04-05 RX ADMIN — HEPARIN SODIUM 11.3 UNITS/KG/HR: 10000 INJECTION, SOLUTION INTRAVENOUS at 16:51

## 2019-04-05 NOTE — ED PROVIDER NOTES
Subjective   68-year-old female presents emergency department with cough and shortness of breath    Patient reports a history of cough for the last couple weeks but with increasing sputum production.  She received of course of antibiotics and was supposed to be retreated with antibiotics starting yesterday, but apparently the prescription was not transmitted so the new antibiotics were not started.  She reports cough productive of clear and yellow sputum but increased volume last night.  She reports a very mild shortness of breath.  She denies any fevers or chills.  She denies any chest pain.  She has no abdominal pain.  She denies any nausea vomiting or diarrhea.  She denies any leg pain redness or swelling or arm pain or redness.  She is nonambulatory but denies marketed shortness of breath with movement in the bed.    She does report a new bump on the skin of her chest wall that has come up over the last several days and is uncomfortable.  She denies any other skin lesions.    Patient reports missing dialysis on Monday and was supposed to dialyze this morning but is missing dialysis today as well.        History provided by:  Patient  Shortness of Breath   Severity:  Mild  Onset quality:  Gradual  Timing:  Intermittent  Progression:  Unable to specify  Chronicity:  Recurrent  Context: URI    Context: not activity and not smoke exposure    Relieved by:  Nothing  Worsened by:  Coughing  Ineffective treatments:  None tried  Associated symptoms: cough and sputum production    Associated symptoms: no abdominal pain, no chest pain, no fever, no hemoptysis, no neck pain, no sore throat, no syncope and no vomiting    Cough:     Cough characteristics:  Productive    Sputum characteristics:  Yellow and clear    Severity:  Moderate    Onset quality:  Gradual    Timing:  Intermittent    Progression:  Worsening    Chronicity:  Recurrent  Risk factors: hx of PE/DVT    Risk factors: no recent alcohol use, no hx of cancer, no  recent surgery and no tobacco use        Review of Systems   Constitutional: Negative.  Negative for chills and fever.   HENT: Negative.  Negative for rhinorrhea and sore throat.    Eyes: Negative.    Respiratory: Positive for cough, sputum production and shortness of breath. Negative for hemoptysis.    Cardiovascular: Negative.  Negative for chest pain and syncope.   Gastrointestinal: Negative.  Negative for abdominal pain, blood in stool, diarrhea, nausea and vomiting.   Genitourinary: Negative.  Negative for dysuria, hematuria and urgency.   Musculoskeletal: Negative for neck pain.   Skin:        Bump on chest   Neurological: Negative.  Negative for syncope.   All other systems reviewed and are negative.      Past Medical History:   Diagnosis Date   • Acute on chronic diastolic heart failure (CMS/HCC)    • Acute on chronic heart failure (CMS/[facility])    • Anemia    • Anxiety    • Asthma    • Chronic diastolic heart failure (CMS/[facility])    • Chronic kidney disease    • Diabetes mellitus (CMS/[facility])    • Diabetes mellitus, type II (CMS/[facility]) 8/3/2018   • History of Clostridium difficile infection 8/3/2018   • History of respiratory failure, since off home oxygen 8/3/2018   • History of UTI 8/3/2018   • Hypertension    • Morbid obesity (CMS/[facility])    • Osteoarthritis    • Tinea capitis 8/3/2018       Allergies   Allergen Reactions   • Geodon [Ziprasidone Hcl] Unknown (See Comments)     PT DOESN'T REMEMBER   • Haldol [Haloperidol Lactate] Unknown (See Comments)     PT DOESN'T REMEMBER   • Seroquel [Quetiapine Fumarate] Unknown (See Comments)     PT DOESN'T REMEMBER       Past Surgical History:   Procedure Laterality Date   • ARTERIOVENOUS FISTULA/SHUNT SURGERY Left 10/22/2018    Procedure: LEFT UPPER EXTREMITY ARTERIOVENOUS FISTULA CREATION;  Surgeon: Carlos Enrique Calderón MD;  Location: [facility];  Service: Vascular   •  SECTION     • COLONOSCOPY N/A 2017    Procedure: COLONOSCOPY;  Surgeon: Mark I Brunner, MD;   Location:  [facility];  Service:    • ENDOSCOPY N/A 8/25/2017    Procedure: ESOPHAGOGASTRODUODENOSCOPY ;  Surgeon: Velasquez Call MD;  Location:  [facility];  Service:    • HERNIA REPAIR     • INSERTION HEMODIALYSIS CATHETER Right 10/17/2018    Procedure: HEMODIALYSIS CATHETER INSERTION;  Surgeon: Carlos Enrique Calderón MD;  Location:  [facility];  Service: General       Family History   Problem Relation Age of Onset   • Cardiomyopathy Mother    • Stroke Father    • Diabetes Father        Social History     Socioeconomic History   • Marital status:      Spouse name: Not on file   • Number of children: Not on file   • Years of education: Not on file   • Highest education level: Not on file   Tobacco Use   • Smoking status: Former Smoker     Packs/day: 0.25   • Tobacco comment: unknown when quit, maybe last year   Substance and Sexual Activity   • Alcohol use: No   • Drug use: No   • Sexual activity: No   Social History Narrative    Mrs. Bueno is a 68 year old AA  female. She lives alone in [place] but has been in rehab for some time. She has a daughter. She does not have an advanced directive.           Objective   Physical Exam   Constitutional: She is oriented to person, place, and time. She appears well-developed and well-nourished. No distress.   Very pleasant lady in no distress, nontoxic appearing, conversive, without any signs of respiratory difficulty, morbidly obese   HENT:   Head: Normocephalic and atraumatic.   Nose: Nose normal.   Mouth/Throat: Oropharynx is clear and moist. No oropharyngeal exudate.   Eyes: Conjunctivae are normal. Pupils are equal, round, and reactive to light.   Neck: Normal range of motion. Neck supple.   Cardiovascular: Normal rate, regular rhythm, normal heart sounds and intact distal pulses.   No murmur heard.  Pulmonary/Chest: Effort normal and breath sounds normal. No stridor. No tachypnea. No respiratory distress. She has no wheezes. She has no  rales. She exhibits mass (Lump in the mid chest just to the right of the sternum with a small area of localized fluctuance tenderness and mild warmth). She exhibits no bony tenderness.   Moderate upper airway sounds but excellent breath sounds bilaterally.  Good breath sounds bilaterally.  No rales rhonchi wheezing or tachypnea   Abdominal: Soft. Bowel sounds are normal. She exhibits no distension. There is no tenderness. There is no rebound and no guarding.   Musculoskeletal:        Right lower leg: She exhibits no tenderness and no edema.        Left lower leg: She exhibits no tenderness and no edema.   Muscular wasting in the lower extremities with minimal movement.  Upper extremities unremarkable.  No stigmata of DVT   Lymphadenopathy:     She has no cervical adenopathy.   Neurological: She is alert and oriented to person, place, and time.   Skin: Skin is warm and dry.   Psychiatric: She has a normal mood and affect.   Nursing note and vitals reviewed.      Incision & Drainage  Date/Time: 4/5/2019 8:24 PM  Performed by: Tarun Evans MD  Authorized by: Tarun Evans MD     Consent:     Consent obtained:  Verbal    Consent given by:  Patient    Risks discussed:  Bleeding, incomplete drainage, infection and pain    Alternatives discussed:  No treatment  Location:     Type:  Abscess    Location:  Trunk    Trunk location:  Chest  Pre-procedure details:     Skin preparation:  Betadine  Anesthesia (see MAR for exact dosages):     Anesthesia method:  Local infiltration    Local anesthetic:  Bupivacaine 0.5% WITH epi  Procedure type:     Complexity:  Complex  Procedure details:     Needle aspiration: no      Incision types:  Single straight    Incision depth:  Dermal    Scalpel blade:  15    Wound management:  Probed and deloculated, irrigated with saline and extensive cleaning    Drainage:  Purulent (copious)    Wound treatment:  Wound left open and drain placed    Packing materials:  1/2 in iodoform gauze     "Amount 1/2\" iodoform:  6-8 inches  Post-procedure details:     Patient tolerance of procedure:  Tolerated well, no immediate complications  Comments:      Patient was about to have her I&D done when she went to dialysis.  I discussed I&D after dialysis and performed this at bed [address]. as agreed upon with the hospitalist.  Patient's prepped and draped in the usual sterile fashion with local anesthesia.  Copious amount of brown thick foul-smelling purulent material was obtained.  This was thoroughly lavaged with saline through an angiographed Angiocath with 6 lavage and drainage passes until the fluid was clear.  Edges were probed with no loculated area after thorough lavage.  Wound was packed with iodoform 1/2 inch gauze.  Mild discomfort which the patient tolerated very well                 ED Course  ED Course as of Apr 05 2029 Fri Apr 05, 2019   1006 Bedside ultrasound of the anterior chest wall reveals a 0.7 x 2.5 cm area of abscess.  I discussed I&D with the patient who is in agreement  [HH]   1008 Patient is reevaluated now feels better nonacute.  She reports that she makes plenty of urine.  She has never missed 2 dialysis sessions in a row but is missed Wednesday and today.  She has never gone for a week without dialysis since onset of dialysis a month ago.  I discussed her findings to date and will proceed with I&D.  [HH]   1026 Paged the hospitalist for admission.  [AS]   1026 Patient serially reevaluated.  She is not in acute pulmonary edema but cannot get dialyzed today and certainly cannot miss a week of dialysis without decompensation.  I discussed the case with Dr. Welch who believes the patient needs to be admitted and dialyzed today and will arrange that.I paged our hospitalist to facilitate admission as per nephrology.  [HH]   1043 Discussed this case with Dr. Worrell who will admit for definitive inpatient care and consultation with nephrology consultation  []      ED Course User Index  [AS] " Valeria Tello  [HH] Tarun Evans MD      Recent Results (from the past 24 hour(s))   POC Troponin, Rapid    Collection Time: 04/05/19  7:14 AM   Result Value Ref Range    Troponin I 0.00 0.00 - 0.07 ng/mL   Comprehensive Metabolic Panel    Collection Time: 04/05/19  7:17 AM   Result Value Ref Range    Glucose 153 (H) 70 - 100 mg/dL    BUN 78 (H) 9 - 23 mg/dL    Creatinine 4.49 (H) 0.60 - 1.30 mg/dL    Sodium 138 132 - 146 mmol/L    Potassium 5.1 3.5 - 5.5 mmol/L    Chloride 105 99 - 109 mmol/L    CO2 23.0 20.0 - 31.0 mmol/L    Calcium 9.1 8.7 - 10.4 mg/dL    Total Protein 6.9 5.7 - 8.2 g/dL    Albumin 3.54 3.20 - 4.80 g/dL    ALT (SGPT) 13 7 - 40 U/L    AST (SGOT) 11 0 - 33 U/L    Alkaline Phosphatase 89 25 - 100 U/L    Total Bilirubin 0.3 0.3 - 1.2 mg/dL    eGFR  African Amer 12 (L) >60 mL/min/1.73    Globulin 3.4 gm/dL    A/G Ratio 1.1 (L) 1.5 - 2.5 g/dL    BUN/Creatinine Ratio 17.4 7.0 - 25.0    Anion Gap 10.0 3.0 - 11.0 mmol/L   Protime-INR    Collection Time: 04/05/19  7:17 AM   Result Value Ref Range    Protime 16.3 (H) 11.2 - 14.3 Seconds    INR 1.37 (H) 0.85 - 1.16   aPTT    Collection Time: 04/05/19  7:17 AM   Result Value Ref Range    PTT 39.4 (H) 24.0 - 37.0 seconds   BNP    Collection Time: 04/05/19  7:17 AM   Result Value Ref Range    .0 (H) 0.0 - 100.0 pg/mL   Lactic Acid, Plasma    Collection Time: 04/05/19  7:17 AM   Result Value Ref Range    Lactate 0.9 0.5 - 2.0 mmol/L   CBC Auto Differential    Collection Time: 04/05/19  7:17 AM   Result Value Ref Range    WBC 11.66 (H) 3.50 - 10.80 10*3/mm3    RBC 3.97 3.89 - 5.14 10*6/mm3    Hemoglobin 11.4 (L) 11.5 - 15.5 g/dL    Hematocrit 37.7 34.5 - 44.0 %    MCV 95.0 80.0 - 99.0 fL    MCH 28.7 27.0 - 31.0 pg    MCHC 30.2 (L) 32.0 - 36.0 g/dL    RDW 18.3 (H) 11.3 - 14.5 %    RDW-SD 63.2 (H) 37.0 - 54.0 fl    MPV 10.7 6.0 - 12.0 fL    Platelets 289 150 - 450 10*3/mm3    Neutrophil % 75.0 (H) 41.0 - 71.0 %    Lymphocyte % 16.6 (L) 24.0 - 44.0 %     Monocyte % 5.1 0.0 - 12.0 %    Eosinophil % 3.0 0.0 - 3.0 %    Basophil % 0.3 0.0 - 1.0 %    Immature Grans % 0.7 (H) 0.0 - 0.6 %    Neutrophils, Absolute 8.75 (H) 1.50 - 8.30 10*3/mm3    Lymphocytes, Absolute 1.94 0.60 - 4.80 10*3/mm3    Monocytes, Absolute 0.59 0.00 - 1.00 10*3/mm3    Eosinophils, Absolute 0.35 (H) 0.00 - 0.30 10*3/mm3    Basophils, Absolute 0.03 0.00 - 0.20 10*3/mm3    Immature Grans, Absolute 0.08 (H) 0.00 - 0.05 10*3/mm3   Light Blue Top    Collection Time: 04/05/19  7:17 AM   Result Value Ref Range    Extra Tube hold for add-on    Green Top (Gel)    Collection Time: 04/05/19  7:17 AM   Result Value Ref Range    Extra Tube Hold for add-ons.    Lavender Top    Collection Time: 04/05/19  7:17 AM   Result Value Ref Range    Extra Tube hold for add-on    Gold Top - SST    Collection Time: 04/05/19  7:17 AM   Result Value Ref Range    Extra Tube Hold for add-ons.    Influenza A & B, RT PCR - Swab, Nasopharynx    Collection Time: 04/05/19  7:18 AM   Result Value Ref Range    Influenza A PCR Not Detected Not Detected    Influenza B PCR Not Detected Not Detected   POC Troponin, Rapid    Collection Time: 04/05/19  8:45 AM   Result Value Ref Range    Troponin I 0.00 0.00 - 0.07 ng/mL   Heparin Anti-Xa    Collection Time: 04/05/19  3:53 PM   Result Value Ref Range    Heparin Anti-Xa (UFH) 0.10 (L) 0.30 - 0.70 IU/ml   Protime-INR    Collection Time: 04/05/19  3:53 PM   Result Value Ref Range    Protime 17.1 (H) 11.2 - 14.3 Seconds    INR 1.46 (H) 0.85 - 1.16     Note: In addition to lab results from this visit, the labs listed above may include labs taken at another facility or during a different encounter within the last 24 hours. Please correlate lab times with ED admission and discharge times for further clarification of the services performed during this visit.    XR Chest 1 View   Final Result   Cardiomegaly and left basilar atelectasis.       D:  04/05/2019   E:  04/05/2019       This report was  finalized on 4/5/2019 11:45 AM by Marcus Garcia.            Vitals:    04/05/19 1400 04/05/19 1430 04/05/19 1501 04/05/19 1523   BP: 128/60 137/79 152/75    BP Location:   Right arm    Patient Position:   Lying    Pulse: 78 83 72    Resp:   20    Temp:   96.7 °F (35.9 °C) 98.2 °F (36.8 °C)   TempSrc:   Temporal Oral   SpO2:       Weight:       Height:         Medications   sodium chloride 0.9 % flush 10 mL (not administered)   Pharmacy to Dose Heparin (not administered)   Pharmacy to dose warfarin (not administered)   heparin 88058 units/250 mL (100 units/mL) in 0.45 % NaCl infusion (11.3 Units/kg/hr × 132 kg Intravenous New Bag 4/5/19 1651)   warfarin (COUMADIN) tablet 8 mg (8 mg Oral Given 4/5/19 1840)   sodium chloride 0.9 % flush 3 mL (not administered)   sodium chloride 0.9 % flush 3-10 mL (not administered)   acetaminophen (TYLENOL) tablet 650 mg (not administered)   ondansetron (ZOFRAN) tablet 4 mg (not administered)     Or   ondansetron (ZOFRAN) injection 4 mg (not administered)   albumin human 25 % IV SOLN 25 g (50 g Intravenous New Bag 4/5/19 1346)   alteplase ((CATHFLO/ACTIVASE)) injection 2 mg (not administered)   alteplase ((CATHFLO/ACTIVASE)) injection 2 mg (not administered)   amLODIPine (NORVASC) tablet 10 mg (not administered)   atorvastatin (LIPITOR) tablet 10 mg (not administered)   calcitriol (ROCALTROL) capsule 0.25 mcg (not administered)   carvedilol (COREG) tablet 25 mg (25 mg Oral Given 4/5/19 1840)   cetirizine (zyrTEC) tablet 10 mg (not administered)   docusate sodium (COLACE) capsule 100 mg (not administered)   budesonide-formoterol (SYMBICORT) 160-4.5 MCG/ACT inhaler 2 puff ( Inhalation Canceled Entry 4/5/19 1250)   hydrALAZINE (APRESOLINE) tablet 100 mg (100 mg Oral Given 4/5/19 1650)   ipratropium-albuterol (DUO-NEB) nebulizer solution 3 mL (not administered)   isosorbide mononitrate (IMDUR) 24 hr tablet 30 mg (30 mg Oral Given 4/5/19 1650)   melatonin tablet 5 mg (not administered)    pantoprazole (PROTONIX) EC tablet 40 mg (40 mg Oral Given 4/5/19 1650)   polyethylene glycol 3350 powder (packet) (not administered)   saccharomyces boulardii (FLORASTOR) capsule 250 mg (not administered)   psyllium (KONSYL) pack 1 packet (not administered)   sertraline (ZOLOFT) tablet 50 mg (50 mg Oral Given 4/5/19 1650)   sevelamer (RENVELA) packet 0.8 g (0.8 g Oral Given 4/5/19 1840)   simethicone (MYLICON) chewable tablet 80 mg (80 mg Oral Given 4/5/19 1840)   amoxicillin-clavulanate (AUGMENTIN) 500-125 MG per tablet 500 mg (not administered)   doxycycline (MONODOX) capsule 100 mg (not administered)   ipratropium-albuterol (DUO-NEB) nebulizer solution 3 mL (3 mL Nebulization Given 4/5/19 0702)   bupivacaine-EPINEPHrine PF (MARCAINE w/EPI) 0.5% -1:520442 injection 10 mL (10 mL Injection Given by Other 4/5/19 1035)   heparin (porcine) injection 8,000 Units (8,000 Units Intravenous Given 4/5/19 1651)     ECG/EMG Results (last 24 hours)     Procedure Component Value Units Date/Time    ECG 12 Lead [[id number]] Collected:  04/05/19 0630     Updated:  04/05/19 0658    Narrative:       Test Reason : soa  Blood Pressure : **/** mmHG  Vent. Rate : 074 BPM     Atrial Rate : 074 BPM     P-R Int : 200 ms          QRS Dur : 080 ms      QT Int : 414 ms       P-R-T Axes : 054 -26 000 degrees     QTc Int : 459 ms    Normal sinus rhythm  Anterolateral infarct (cited on or before 14-OCT-2018)  Abnormal ECG  When compared with ECG of 11-FEB-2019 14:25,  No significant change was found  Confirmed by BUTCH FAUST MD ([id number]) on 4/5/2019 6:58:32 AM    Referred By:  NEEL BRADEN           Confirmed By:BUTCH FAUST MD    ECG 12 Lead [[id number]] Collected:  04/05/19 0849     Updated:  04/05/19 0907        ECG 12 Lead         ECG 12 Lead   Final Result   Test Reason : soa   Blood Pressure : **/** mmHG   Vent. Rate : 074 BPM     Atrial Rate : 074 BPM      P-R Int : 200 ms          QRS Dur : 080 ms       QT Int : 414 ms       P-R-T Axes : 054 -26  000 degrees      QTc Int : 459 ms      Normal sinus rhythm   Anterolateral infarct (cited on or before 14-OCT-2018)   Abnormal ECG   When compared with ECG of 11-FEB-2019 14:25,   No significant change was found   Confirmed by BUTCH EVANS MD (80) on 4/5/2019 6:58:32 AM      Referred By:  ED MD           Confirmed By:BUTCH EVANS MD                     [facility]      Final diagnoses:   Shortness of breath   Upper respiratory tract infection, unspecified type   Abscess of chest wall   Chronic kidney disease with end stage renal failure on dialysis (CMS/[facility])   Chronic anticoagulation     Documentation assistance provided by Butch Evans MD acting as scribe for MD Omer Ferguson Audrey  04/05/19 1031       Valeria Tello  04/05/19 1221       Butch Evans MD  04/05/19 2027       Butch Evans MD  04/05/19 2029

## 2019-04-05 NOTE — H&P
[facility] Medicine Services  HISTORY AND PHYSICAL    Patient Name: Joy Bueno  : 1950  MRN: [id number]  Primary Care Physician: Provider, No Known  Date of admission: 2019      Subjective   Subjective     Chief Complaint:  Cough, congestion    HPI:  Joy Bueno is a 68 y.o. female with hx of HTN, chronic diastolic CHF, ESRD on HD (started 2018), DM2, recurrent UTI's (ESBL Klebsiella), anemia of chronic disease, LUE brachial DVT (2019, on Coumadin), and prior C.diff infection presents to the ER due to URI symptoms for the past 3 weeks. Her PCP called in an antibiotic for her yesterday but she is not sure what it was and has not picked it up yet. Cough is productive of yellow sputum. Has some mild SOA. Due to not feeling well, she missed dialysis on Wednesday and missed it again this morning. She also complains of a new bump on her right chest underneath her breast, noticed it a couple days ago and now complains of pain. Denies fevers or chills. In the ER, CXR was unremarkable. Labs showed mild leukocytosis and subtherapeutic INR 1.37. Nephrology was called and recommended admission for dialysis since she will be unable to get it again until Monday.    Of note patient had a posterior soft tissue neck abscess that drained spontaneously last admission, cultures grew Proteus mirabilis and Staph lugdunensis.    Review of Systems     Otherwise complete ROS reviewed and is negative except as mentioned in the HPI.    Personal History     Past Medical History:   Diagnosis Date   • Acute on chronic diastolic heart failure (CMS/HCC)    • Acute on chronic heart failure (CMS/HCC)    • Anemia    • Anxiety    • Asthma    • Chronic diastolic heart failure (CMS/HCC)    • Chronic kidney disease    • Diabetes mellitus (CMS/HCC)    • Diabetes mellitus, type II (CMS/HCC) 8/3/2018   • History of Clostridium difficile infection 8/3/2018   • History of respiratory failure, since  off home oxygen 8/3/2018   • History of UTI 8/3/2018   • Hypertension    • Morbid obesity (CMS/HCC)    • Osteoarthritis    • Tinea capitis 8/3/2018       Past Surgical History:   Procedure Laterality Date   • ARTERIOVENOUS FISTULA/SHUNT SURGERY Left 10/22/2018    Procedure: LEFT UPPER EXTREMITY ARTERIOVENOUS FISTULA CREATION;  Surgeon: Carlos Enrique Calderón MD;  Location:  [facility];  Service: Vascular   •  SECTION     • COLONOSCOPY N/A 2017    Procedure: COLONOSCOPY;  Surgeon: Mark I Brunner, MD;  Location:  [facility];  Service:    • ENDOSCOPY N/A 2017    Procedure: ESOPHAGOGASTRODUODENOSCOPY ;  Surgeon: Velasquez Call MD;  Location:  [facility];  Service:    • HERNIA REPAIR     • INSERTION HEMODIALYSIS CATHETER Right 10/17/2018    Procedure: HEMODIALYSIS CATHETER INSERTION;  Surgeon: Carlos Enrique Calderón MD;  Location:  [facility];  Service: General       Family History: family history includes Cardiomyopathy in her mother; Diabetes in her father; Stroke in her father. Otherwise pertinent FHx was reviewed and unremarkable.     Social History:  reports that she has quit smoking. She smoked 0.25 packs per day. She does not have any smokeless tobacco history on file. She reports that she does not drink alcohol or use drugs.  Social History     Social History Narrative    Mrs. Bueno is a 68 year old AA  female. She lives alone in [place] but has been in rehab for some time. She has a daughter. She does not have an advanced directive.       Medications:    Available home medication information reviewed.    (Not in a hospital admission)    Allergies   Allergen Reactions   • Geodon [Ziprasidone Hcl] Unknown (See Comments)     PT DOESN'T REMEMBER   • Haldol [Haloperidol Lactate] Unknown (See Comments)     PT DOESN'T REMEMBER   • Seroquel [Quetiapine Fumarate] Unknown (See Comments)     PT DOESN'T REMEMBER       Objective   Objective     Vital Signs:   Temp:  [96.5 °F (35.8  °C)-98.1 °F (36.7 °C)] 96.5 °F (35.8 °C)  Heart Rate:  [67-75] 75  Resp:  [18-20] 18  BP: (126-140)/(61-74) 136/63        Physical Exam   Gen-no acute distress, seen on dialysis  HENT-NCAT, mucous membranes moist  CV-RRR, S1 S2 normal, no m/r/g  Resp-CTAB, no wheezes or rales  Abd-soft, NT, ND, +BS  Ext-no edema  Neuro-A&Ox3, no focal deficits  Skin-no rashes; approximately 3 cm raised area just to the right of the sternum/mid chest with some fluctuance and warmth; tender to palpation  Psych-appropriate mood      Results Reviewed:  I have personally reviewed current lab, radiology, and data and agree.    Results from last 7 days   Lab Units 04/05/19  0717   WBC 10*3/mm3 11.66*   HEMOGLOBIN g/dL 11.4*   HEMATOCRIT % 37.7   PLATELETS 10*3/mm3 289   INR  1.37*     Results from last 7 days   Lab Units 04/05/19  0845 04/05/19  0717   SODIUM mmol/L  --  138   POTASSIUM mmol/L  --  5.1   CHLORIDE mmol/L  --  105   CO2 mmol/L  --  23.0   BUN mg/dL  --  78*   CREATININE mg/dL  --  4.49*   GLUCOSE mg/dL  --  153*   CALCIUM mg/dL  --  9.1   ALT (SGPT) U/L  --  13   AST (SGOT) U/L  --  11   TROPONIN I ng/mL 0.00  --      Estimated Creatinine Clearance: 16.4 mL/min (A) (by C-G formula based on SCr of 4.49 mg/dL (H)).  Brief Urine Lab Results  (Last result in the past 365 days)      Color   Clarity   Blood   Leuk Est   Nitrite   Protein   CREAT   Urine HCG        02/11/19 1417 Yellow Cloudy Trace Large (3+) Negative 100 mg/dL (2+)             BNP   Date Value Ref Range Status   04/05/2019 113.0 (H) 0.0 - 100.0 pg/mL Final     Comment:     Results may be falsely decreased if patient taking Biotin.     Imaging Results (last 24 hours)     Procedure Component Value Units Date/Time    XR Chest 1 View [[id number]] Collected:  04/05/19 0830     Updated:  04/05/19 1149    Narrative:       EXAMINATION: XR CHEST 1 VW-04/05/2019:      INDICATION: SOB.     TECHNIQUE:  Single view frontal chest.     COMPARISONS: 02/13/2019.     FINDINGS:   Unchanged tunneled right IJ dialysis catheter. Cardiomegaly;  double density overlying the cardiac silhouette probably indicates left  atrial enlargement. The left hemidiaphragm silhouette is obscured,  probably due to atelectasis. No sizable pleural effusion. No  pneumothorax.       Impression:       Cardiomegaly and left basilar atelectasis.     D:  04/05/2019  E:  04/05/2019     This report was finalized on 4/5/2019 11:45 AM by Marcus Garcia.           Results for orders placed during the hospital encounter of 09/02/17   Adult Transthoracic Echo Complete    Narrative · Left ventricular wall thickness is consistent with mild concentric   hypertrophy.  · Left atrial cavity size is mildly dilated.  · Mild mitral valve regurgitation is present  · calcification of the aortic valve  · Mild tricuspid valve regurgitation is present.          Assessment/Plan   Assessment / Plan     Active Hospital Problems    Diagnosis POA   • **URI (upper respiratory infection) [J06.9] Yes   • ESRD (end stage renal disease) (CMS/[facility]) [N18.6] Yes   • Chest wall abscess [L02.213] Yes   • Subtherapeutic international normalized ratio (INR) [R79.1] Yes   • History of deep vein thrombosis (DVT) of brachial vein of left upper extremity (CMS/[facility]) [I82.622] Yes   • Chronic diastolic heart failure (CMS/[facility]) [I50.32] Yes   • Type 2 diabetes mellitus with chronic kidney disease on chronic dialysis, with long-term current use of insulin (CMS/[facility]) [E11.22, N18.6, Z99.2, Z79.4] Not Applicable   • Essential hypertension [I10] Yes     Ms. Bueno is a 67 yo F with hx of HTN, chronic diastolic CHF, ESRD on HD (started October 2018), DM2, recurrent UTI's (ESBL Klebsiella), anemia of chronic disease, LUE brachial DVT (Feb 2019, on Coumadin), and prior C.diff infection presents to the ER due to URI symptoms for the past 3 weeks. She has missed 2 dialysis sessions due to not feeling well. She will be admitted to receive dialysis. Also found to have  subtherapeutic INR along with a small chest wall abscess.    PLAN:  --Nephrology following, dialysis started. No signs of decompensation, no pulmonary edema or respiratory distress. Continue MWF.  --Unfortunately cannot use Lovenox to bridge in ESRD, so will start heparin drip and continue Coumadin until INR >2.0. Pharmacy to dose.  --Dr. Evans plans to I&D the chest abscess. Will send for culture. I will plan to start patient on Doxycycline and Augmentin empirically to cover for the abscess as well as any lung infection, but will wait for culture to be collected.      DVT prophylaxis:  Coumadin/heparin drip    CODE STATUS:    Code Status and Medical Interventions:   Ordered at: 04/05/19 1154     Code Status:    CPR     Medical Interventions (Level of Support Prior to Arrest):    Full       Admission Status:  I believe this patient meets OBSERVATION status, however if further evaluation or treatment plans warrant, status may change.  Based upon current information, I predict patient's care encounter to be less than or equal to 2 midnights.      Electronically signed by Kelli Worrell MD, 04/05/19, 12:34 PM

## 2019-04-05 NOTE — CONSULTS
Referring Provider: Rocky Nova   Reason for Consultation: ESRD     Subjective     Chief complaint Cough and shortness of breath     History of present illness:  This is 68 year old female with PMH of ESRD on dialysis MWF schedule who presented to ED for cough and shortness of breath. She has been feeling sick and has missed 2 session of dialysis. Nephrology service has been consulted for ESRD management.      History  Past Medical History:   Diagnosis Date   • Acute on chronic diastolic heart failure (CMS/HCC)    • Acute on chronic heart failure (CMS/[facility])    • Anemia    • Anxiety    • Asthma    • Chronic diastolic heart failure (CMS/HCC)    • Chronic kidney disease    • Diabetes mellitus (CMS/[facility])    • Diabetes mellitus, type II (CMS/[facility]) 8/3/2018   • History of Clostridium difficile infection 8/3/2018   • History of respiratory failure, since off home oxygen 8/3/2018   • History of UTI 8/3/2018   • Hypertension    • Morbid obesity (CMS/[facility])    • Osteoarthritis    • Tinea capitis 8/3/2018   ,   Past Surgical History:   Procedure Laterality Date   • ARTERIOVENOUS FISTULA/SHUNT SURGERY Left 10/22/2018    Procedure: LEFT UPPER EXTREMITY ARTERIOVENOUS FISTULA CREATION;  Surgeon: Carlos Enrique Calderón MD;  Location:  [facility];  Service: Vascular   •  SECTION     • COLONOSCOPY N/A 2017    Procedure: COLONOSCOPY;  Surgeon: Mark I Brunner, MD;  Location:  [facility];  Service:    • ENDOSCOPY N/A 2017    Procedure: ESOPHAGOGASTRODUODENOSCOPY ;  Surgeon: Velasquez Call MD;  Location:  [facility];  Service:    • HERNIA REPAIR     • INSERTION HEMODIALYSIS CATHETER Right 10/17/2018    Procedure: HEMODIALYSIS CATHETER INSERTION;  Surgeon: Carlos Enrique Calderón MD;  Location:  [facility];  Service: General   ,   Family History   Problem Relation Age of Onset   • Cardiomyopathy Mother    • Stroke Father    • Diabetes Father    ,   Social History     Tobacco Use   • Smoking status: Former  Smoker     Packs/day: 0.25   • Tobacco comment: unknown when quit, maybe last year   Substance Use Topics   • Alcohol use: No   • Drug use: No   ,   (Not in a hospital admission), Scheduled Meds:    warfarin 7.5 mg Oral Daily   , Continuous Infusions:    heparin (porcine) 11.3 Units/kg/hr   Pharmacy to Dose Heparin    Pharmacy to dose warfarin    , PRN Meds:  Pharmacy to Dose Heparin  •  Pharmacy to dose warfarin  •  [COMPLETED] Insert peripheral IV **AND** sodium chloride and Allergies:  Geodon [ziprasidone hcl]; Haldol [haloperidol lactate]; and Seroquel [quetiapine fumarate]    Review of Systems  Pertinent items are noted in HPI    Objective     Vital Signs  Temp:  [98.1 °F (36.7 °C)] 98.1 °F (36.7 °C)  Heart Rate:  [67-74] 71  Resp:  [20] 20  BP: (126-140)/(61-74) 136/68    No intake/output data recorded.  No intake/output data recorded.    Physical Exam:     General Appearance:    Alert, cooperative, in no acute distress   Head:    Normocephalic, without obvious abnormality, atraumatic   Eyes:            Lids and lashes normal, conjunctivae and sclerae normal, no   icterus, no pallor, corneas clear, PERRLA           Neck:   No adenopathy, supple, trachea midline, no thyromegaly, no     carotid bruit, no JVD       Lungs:     Clear to auscultation,respirations regular, even and                   unlabored    Heart:    Regular rhythm and normal rate, normal S1 and S2, no            murmur, no gallop, no rub, no click       Abdomen:     Normal bowel sounds, no masses, no organomegaly, soft        non-tender, non-distended, no guarding, no rebound                 tenderness       Extremities:   Moves all extremities well, no edema, no cyanosis, no              redness   Pulses:   Pulses palpable and equal bilaterally   Skin:   No bleeding, bruising or rash   Lymph nodes:   No palpable adenopathy   Neurologic:   Cranial nerves 2 - 12 grossly intact, sensation intact, DTR        present and equal bilaterally        Results Review:   I reviewed the patient's new clinical results.    WBC WBC   Date Value Ref Range Status   04/05/2019 11.66 (H) 3.50 - 10.80 10*3/mm3 Final      HGB Hemoglobin   Date Value Ref Range Status   04/05/2019 11.4 (L) 11.5 - 15.5 g/dL Final      HCT Hematocrit   Date Value Ref Range Status   04/05/2019 37.7 34.5 - 44.0 % Final      Platlets No results found for: LABPLAT   MCV MCV   Date Value Ref Range Status   04/05/2019 95.0 80.0 - 99.0 fL Final          Sodium Sodium   Date Value Ref Range Status   04/05/2019 138 132 - 146 mmol/L Final      Potassium Potassium   Date Value Ref Range Status   04/05/2019 5.1 3.5 - 5.5 mmol/L Final      Chloride Chloride   Date Value Ref Range Status   04/05/2019 105 99 - 109 mmol/L Final      CO2 CO2   Date Value Ref Range Status   04/05/2019 23.0 20.0 - 31.0 mmol/L Final      BUN BUN   Date Value Ref Range Status   04/05/2019 78 (H) 9 - 23 mg/dL Final      Creatinine Creatinine   Date Value Ref Range Status   04/05/2019 4.49 (H) 0.60 - 1.30 mg/dL Final      Calcium Calcium   Date Value Ref Range Status   04/05/2019 9.1 8.7 - 10.4 mg/dL Final      PO4 No results found for: CAPO4   Albumin Albumin   Date Value Ref Range Status   04/05/2019 3.54 3.20 - 4.80 g/dL Final      Magnesium No results found for: MG   Uric Acid No results found for: URICACID           warfarin 7.5 mg Oral Daily       heparin (porcine) 11.3 Units/kg/hr   Pharmacy to Dose Heparin    Pharmacy to dose warfarin        Assessment/Plan       URI (upper respiratory infection)      1- ESRD - MWF   2- HTn - controlled.   3- Anemia of chronic disease. At target  4- CKD MBD     Plan:  - HD today. UF as tolerated.   - Renal diet   - Continue with vitamin D, calcitriol and renvela.     I discussed the patients findings and my recommendations with patient and nursing staff    Jackson Welch MD  04/05/19  @NOW

## 2019-04-06 ENCOUNTER — APPOINTMENT (OUTPATIENT)
Dept: NEPHROLOGY | Facility: HOSPITAL | Age: 69
End: 2019-04-06

## 2019-04-06 PROBLEM — N18.5 ANEMIA OF CHRONIC RENAL FAILURE, STAGE 5 (HCC): Chronic | Status: ACTIVE | Noted: 2018-09-12

## 2019-04-06 PROBLEM — Z99.2 TYPE 2 DIABETES MELLITUS WITH CHRONIC KIDNEY DISEASE ON CHRONIC DIALYSIS, WITH LONG-TERM CURRENT USE OF INSULIN (HCC): Chronic | Status: ACTIVE | Noted: 2018-08-03

## 2019-04-06 PROBLEM — N18.6 TYPE 2 DIABETES MELLITUS WITH CHRONIC KIDNEY DISEASE ON CHRONIC DIALYSIS, WITH LONG-TERM CURRENT USE OF INSULIN (HCC): Chronic | Status: ACTIVE | Noted: 2018-08-03

## 2019-04-06 PROBLEM — N18.6 ESRD (END STAGE RENAL DISEASE) (HCC): Chronic | Status: ACTIVE | Noted: 2019-04-05

## 2019-04-06 PROBLEM — E11.22 TYPE 2 DIABETES MELLITUS WITH CHRONIC KIDNEY DISEASE ON CHRONIC DIALYSIS, WITH LONG-TERM CURRENT USE OF INSULIN (HCC): Chronic | Status: ACTIVE | Noted: 2018-08-03

## 2019-04-06 PROBLEM — I82.622 ACUTE DEEP VEIN THROMBOSIS (DVT) OF BRACHIAL VEIN OF LEFT UPPER EXTREMITY (HCC): Chronic | Status: ACTIVE | Noted: 2019-02-20

## 2019-04-06 PROBLEM — I50.32 CHRONIC DIASTOLIC HEART FAILURE (HCC): Chronic | Status: ACTIVE | Noted: 2018-10-23

## 2019-04-06 PROBLEM — E87.70 VOLUME OVERLOAD: Status: ACTIVE | Noted: 2019-04-06

## 2019-04-06 PROBLEM — D63.1 ANEMIA OF CHRONIC RENAL FAILURE, STAGE 5 (HCC): Chronic | Status: ACTIVE | Noted: 2018-09-12

## 2019-04-06 PROBLEM — Z79.4 TYPE 2 DIABETES MELLITUS WITH CHRONIC KIDNEY DISEASE ON CHRONIC DIALYSIS, WITH LONG-TERM CURRENT USE OF INSULIN (HCC): Chronic | Status: ACTIVE | Noted: 2018-08-03

## 2019-04-06 LAB
ANION GAP SERPL CALCULATED.3IONS-SCNC: 10 MMOL/L (ref 3–11)
BUN BLD-MCNC: 46 MG/DL (ref 9–23)
BUN/CREAT SERPL: 13.5 (ref 7–25)
CALCIUM SPEC-SCNC: 9 MG/DL (ref 8.7–10.4)
CHLORIDE SERPL-SCNC: 104 MMOL/L (ref 99–109)
CO2 SERPL-SCNC: 23 MMOL/L (ref 20–31)
CREAT BLD-MCNC: 3.4 MG/DL (ref 0.6–1.3)
DEPRECATED RDW RBC AUTO: 64.5 FL (ref 37–54)
ERYTHROCYTE [DISTWIDTH] IN BLOOD BY AUTOMATED COUNT: 18.4 % (ref 11.3–14.5)
GFR SERPL CREATININE-BSD FRML MDRD: 16 ML/MIN/1.73
GLUCOSE BLD-MCNC: 233 MG/DL (ref 70–100)
GLUCOSE BLDC GLUCOMTR-MCNC: 151 MG/DL (ref 70–130)
GLUCOSE BLDC GLUCOMTR-MCNC: 230 MG/DL (ref 70–130)
GLUCOSE BLDC GLUCOMTR-MCNC: 231 MG/DL (ref 70–130)
GLUCOSE BLDC GLUCOMTR-MCNC: 250 MG/DL (ref 70–130)
HCT VFR BLD AUTO: 33.1 % (ref 34.5–44)
HGB BLD-MCNC: 9.9 G/DL (ref 11.5–15.5)
INR PPP: 1.6 (ref 0.85–1.16)
MCH RBC QN AUTO: 28.3 PG (ref 27–31)
MCHC RBC AUTO-ENTMCNC: 29.9 G/DL (ref 32–36)
MCV RBC AUTO: 94.6 FL (ref 80–99)
PLATELET # BLD AUTO: 279 10*3/MM3 (ref 150–450)
PMV BLD AUTO: 11 FL (ref 6–12)
POTASSIUM BLD-SCNC: 4.2 MMOL/L (ref 3.5–5.5)
PROTHROMBIN TIME: 18.3 SECONDS (ref 11.2–14.3)
RBC # BLD AUTO: 3.5 10*6/MM3 (ref 3.89–5.14)
SODIUM BLD-SCNC: 137 MMOL/L (ref 132–146)
UFH PPP CHRO-ACNC: 0.23 IU/ML (ref 0.3–0.7)
UFH PPP CHRO-ACNC: 0.46 IU/ML (ref 0.3–0.7)
UFH PPP CHRO-ACNC: 0.53 IU/ML (ref 0.3–0.7)
WBC NRBC COR # BLD: 17.05 10*3/MM3 (ref 3.5–10.8)

## 2019-04-06 PROCEDURE — 96366 THER/PROPH/DIAG IV INF ADDON: CPT

## 2019-04-06 PROCEDURE — 96376 TX/PRO/DX INJ SAME DRUG ADON: CPT

## 2019-04-06 PROCEDURE — 85610 PROTHROMBIN TIME: CPT | Performed by: HOSPITALIST

## 2019-04-06 PROCEDURE — 63710000001 INSULIN LISPRO (HUMAN) PER 5 UNITS: Performed by: FAMILY MEDICINE

## 2019-04-06 PROCEDURE — G0257 UNSCHED DIALYSIS ESRD PT HOS: HCPCS

## 2019-04-06 PROCEDURE — G0378 HOSPITAL OBSERVATION PER HR: HCPCS

## 2019-04-06 PROCEDURE — 85027 COMPLETE CBC AUTOMATED: CPT | Performed by: HOSPITALIST

## 2019-04-06 PROCEDURE — 94799 UNLISTED PULMONARY SVC/PX: CPT

## 2019-04-06 PROCEDURE — 25010000002 HEPARIN (PORCINE) PER 1000 UNITS

## 2019-04-06 PROCEDURE — 82962 GLUCOSE BLOOD TEST: CPT

## 2019-04-06 PROCEDURE — 25010000002 HEPARIN (PORCINE) IN NACL 25000-0.45 UT/250ML-% SOLUTION

## 2019-04-06 PROCEDURE — 25010000002 ALTEPLASE 2 MG RECONSTITUTED SOLUTION: Performed by: INTERNAL MEDICINE

## 2019-04-06 PROCEDURE — 85520 HEPARIN ASSAY: CPT

## 2019-04-06 PROCEDURE — 80048 BASIC METABOLIC PNL TOTAL CA: CPT | Performed by: HOSPITALIST

## 2019-04-06 PROCEDURE — 99226 PR SBSQ OBSERVATION CARE/DAY 35 MINUTES: CPT | Performed by: FAMILY MEDICINE

## 2019-04-06 RX ORDER — DEXTROSE MONOHYDRATE 25 G/50ML
25 INJECTION, SOLUTION INTRAVENOUS
Status: DISCONTINUED | OUTPATIENT
Start: 2019-04-06 | End: 2019-04-08 | Stop reason: HOSPADM

## 2019-04-06 RX ORDER — NICOTINE POLACRILEX 4 MG
15 LOZENGE BUCCAL
Status: DISCONTINUED | OUTPATIENT
Start: 2019-04-06 | End: 2019-04-08 | Stop reason: HOSPADM

## 2019-04-06 RX ORDER — HEPARIN SODIUM 1000 [USP'U]/ML
5000 INJECTION, SOLUTION INTRAVENOUS; SUBCUTANEOUS ONCE
Status: COMPLETED | OUTPATIENT
Start: 2019-04-06 | End: 2019-04-06

## 2019-04-06 RX ADMIN — HEPARIN SODIUM 13 UNITS/KG/HR: 10000 INJECTION, SOLUTION INTRAVENOUS at 08:12

## 2019-04-06 RX ADMIN — SEVELAMER CARBONATE 0.8 G: 800 POWDER, FOR SUSPENSION ORAL at 17:19

## 2019-04-06 RX ADMIN — Medication 250 MG: at 11:05

## 2019-04-06 RX ADMIN — PANTOPRAZOLE SODIUM 40 MG: 40 TABLET, DELAYED RELEASE ORAL at 05:57

## 2019-04-06 RX ADMIN — MELATONIN TAB 5 MG 5 MG: 5 TAB at 21:04

## 2019-04-06 RX ADMIN — SODIUM CHLORIDE, PRESERVATIVE FREE 3 ML: 5 INJECTION INTRAVENOUS at 21:03

## 2019-04-06 RX ADMIN — HYDRALAZINE HYDROCHLORIDE 100 MG: 25 TABLET ORAL at 05:57

## 2019-04-06 RX ADMIN — INSULIN LISPRO 6 UNITS: 100 INJECTION, SOLUTION INTRAVENOUS; SUBCUTANEOUS at 21:04

## 2019-04-06 RX ADMIN — SEVELAMER CARBONATE 0.8 G: 800 POWDER, FOR SUSPENSION ORAL at 11:55

## 2019-04-06 RX ADMIN — SIMETHICONE CHEW TAB 80 MG 80 MG: 80 TABLET ORAL at 05:56

## 2019-04-06 RX ADMIN — SIMETHICONE CHEW TAB 80 MG 80 MG: 80 TABLET ORAL at 11:55

## 2019-04-06 RX ADMIN — CARVEDILOL 25 MG: 12.5 TABLET, FILM COATED ORAL at 17:19

## 2019-04-06 RX ADMIN — Medication 250 MG: at 21:04

## 2019-04-06 RX ADMIN — CETIRIZINE HYDROCHLORIDE 10 MG: 10 TABLET, FILM COATED ORAL at 11:04

## 2019-04-06 RX ADMIN — WARFARIN SODIUM 8 MG: 4 TABLET ORAL at 17:19

## 2019-04-06 RX ADMIN — DOXYCYCLINE 100 MG: 100 CAPSULE ORAL at 21:04

## 2019-04-06 RX ADMIN — ACETAMINOPHEN 650 MG: 325 TABLET, FILM COATED ORAL at 22:40

## 2019-04-06 RX ADMIN — SIMETHICONE CHEW TAB 80 MG 80 MG: 80 TABLET ORAL at 17:19

## 2019-04-06 RX ADMIN — HEPARIN SODIUM 13 UNITS/KG/HR: 10000 INJECTION, SOLUTION INTRAVENOUS at 22:27

## 2019-04-06 RX ADMIN — ATORVASTATIN CALCIUM 10 MG: 10 TABLET, FILM COATED ORAL at 21:04

## 2019-04-06 RX ADMIN — AMOXICILLIN AND CLAVULANATE POTASSIUM 500 MG: 500; 125 TABLET, FILM COATED ORAL at 16:11

## 2019-04-06 RX ADMIN — BUDESONIDE AND FORMOTEROL FUMARATE DIHYDRATE 2 PUFF: 160; 4.5 AEROSOL RESPIRATORY (INHALATION) at 20:19

## 2019-04-06 RX ADMIN — AMLODIPINE BESYLATE 10 MG: 10 TABLET ORAL at 11:04

## 2019-04-06 RX ADMIN — SERTRALINE HYDROCHLORIDE 50 MG: 50 TABLET ORAL at 11:03

## 2019-04-06 RX ADMIN — HYDRALAZINE HYDROCHLORIDE 100 MG: 25 TABLET ORAL at 21:04

## 2019-04-06 RX ADMIN — INSULIN LISPRO 4 UNITS: 100 INJECTION, SOLUTION INTRAVENOUS; SUBCUTANEOUS at 17:18

## 2019-04-06 RX ADMIN — CARVEDILOL 25 MG: 12.5 TABLET, FILM COATED ORAL at 11:04

## 2019-04-06 RX ADMIN — PSYLLIUM HUSK 1 PACKET: 3.5 GRANULE ORAL at 11:05

## 2019-04-06 RX ADMIN — ALTEPLASE: 2.2 INJECTION, POWDER, LYOPHILIZED, FOR SOLUTION INTRAVENOUS at 11:06

## 2019-04-06 RX ADMIN — CALCITRIOL 0.25 MCG: 0.25 CAPSULE ORAL at 11:03

## 2019-04-06 RX ADMIN — ALTEPLASE: 2.2 INJECTION, POWDER, LYOPHILIZED, FOR SOLUTION INTRAVENOUS at 11:05

## 2019-04-06 RX ADMIN — HYDRALAZINE HYDROCHLORIDE 100 MG: 25 TABLET ORAL at 16:10

## 2019-04-06 RX ADMIN — HEPARIN SODIUM 5000 UNITS: 1000 INJECTION, SOLUTION INTRAVENOUS; SUBCUTANEOUS at 05:56

## 2019-04-06 RX ADMIN — ISOSORBIDE MONONITRATE 30 MG: 30 TABLET, EXTENDED RELEASE ORAL at 11:04

## 2019-04-06 RX ADMIN — DOXYCYCLINE 100 MG: 100 CAPSULE ORAL at 11:03

## 2019-04-06 NOTE — PROGRESS NOTES
HEPARIN INFUSION  Joy Bueno is a  68 y.o. female receiving heparin infusion.     Therapy for VTE  Patient Weight: 132kg  Initial Bolus (Y/N):   Y  Any Bolus (Y/N):   Y        Signs or Symptoms of Bleeding: N  VTE (PE/DVT)   Initial Bolus: 80 units/kg (Max 10,000 units)  Initial rate: 18 units/kg/hr (Max 1,500 units/hr)      (Anti-Xa) (IU/mL) Bolus Dose Stop Infusion Rate Change Repeat (Anti-Xa)     ? 0.19 80 units/kg 0 hrs Increase rate by   4 units/kg/hr 6 hrs      0.2 - 0.29 40 units/kg 0 hrs Increase rate by 2 units/kg/hr 6 hrs    0.3 - 0.7  0 0 hrs No change 6 hrs      0.71 - 0.99   0  0 hrs Decrease rate by 2 units/kg/hr 6 hrs      ? 1 0 Hold 1 hr Decrease rate by 3 units/kg/hr 6 hrs      Results from last 7 days   Lab Units 04/06/19  0443 04/05/19  1553 04/05/19  0717   INR  1.60* 1.46* 1.37*   HEMOGLOBIN g/dL 9.9*  --  11.4*   HEMATOCRIT % 33.1*  --  37.7   PLATELETS 10*3/mm3 279  --  289       Date   Time   Anti-Xa Current Rate (Unit/kg/hr) Bolus   (Units) Rate Change   (Unit/kg/hr) New Rate (Unit/kg/hr) Next   Anti-Xa Comments  Pump Check Daily   4/5 1055 pending -- 8000 +11.3 11.3 pending Spoke with ED Rn. Patient about to be transferred to dialysis. Will be started when they go to floor    4/5 2214 0.43 11.3 - - 11.3 0400 Terri Fernandez    4/6 0443 0.23 11.3 5000 +2 13 1200 [facility]  4/6/2019  5:39 AM

## 2019-04-06 NOTE — PLAN OF CARE
Problem: Fall Risk (Adult)  Goal: Absence of Fall  Outcome: Ongoing (interventions implemented as appropriate)      Problem: Patient Care Overview  Goal: Plan of Care Review  Outcome: Ongoing (interventions implemented as appropriate)   04/06/19 0351   Coping/Psychosocial   Plan of Care Reviewed With patient   Plan of Care Review   Progress no change   OTHER   Outcome Summary VSS. RA. NSR. Abcess under right breast drained by ER MD - gauze and taped. Pt. c/o SOA. Prn neb given. Plan for dialysis today. Will continue to monitor.        Problem: Skin Injury Risk (Adult)  Goal: Skin Health and Integrity  Outcome: Ongoing (interventions implemented as appropriate)

## 2019-04-06 NOTE — PROGRESS NOTES
"   LOS: 0 days    Patient Care Team:  Provider, No Known as PCP - General        Subjective     Interval History:     No acute events overnight. No new complaints.     Review of Systems:   No CP or SOA    Objective     Vital Sign Min/Max for last 24 hours  Temp  Min: 96.5 °F (35.8 °C)  Max: 98.3 °F (36.8 °C)   BP  Min: 92/48  Max: 158/72   Pulse  Min: 62  Max: 96   Resp  Min: 18  Max: 24   SpO2  Min: 96 %  Max: 100 %   No Data Recorded   No Data Recorded     Flowsheet Rows      First Filed Value   Admission Height  162.6 cm (64\") Documented at 04/05/2019 0624   Admission Weight  132 kg (290 lb) Documented at 04/05/2019 0624          No intake/output data recorded.  I/O last 3 completed shifts:  In: 800 [P.O.:600; IV Piggyback:200]  Out: 1140 [Urine:340; Other:800]    Physical Exam:     General Appearance:    Alert, cooperative, in no acute distress   Head:    Normocephalic, without obvious abnormality, atraumatic               Neck:   No adenopathy, supple, trachea midline, no thyromegaly, no     carotid bruit, no JVD       Lungs:     Clear to auscultation,respirations regular, even and                   unlabored    Heart:    Regular rhythm and normal rate, normal S1 and S2, no            murmur, no gallop, no rub, no click   Breast Exam:    Deferred   Abdomen:     Normal bowel sounds, no masses, no organomegaly, soft        non-tender, non-distended, no guarding, no rebound                 tenderness   Genitalia:    Deferred   Extremities:   Moves all extremities well, no edema, no cyanosis, no              redness               Neurologic:   No focal deficit noted.        WBC WBC   Date Value Ref Range Status   04/06/2019 17.05 (H) 3.50 - 10.80 10*3/mm3 Final   04/05/2019 11.66 (H) 3.50 - 10.80 10*3/mm3 Final      HGB Hemoglobin   Date Value Ref Range Status   04/06/2019 9.9 (L) 11.5 - 15.5 g/dL Final   04/05/2019 11.4 (L) 11.5 - 15.5 g/dL Final      HCT Hematocrit   Date Value Ref Range Status   04/06/2019 33.1 " (L) 34.5 - 44.0 % Final   04/05/2019 37.7 34.5 - 44.0 % Final      Platlets No results found for: LABPLAT   MCV MCV   Date Value Ref Range Status   04/06/2019 94.6 80.0 - 99.0 fL Final   04/05/2019 95.0 80.0 - 99.0 fL Final          Sodium Sodium   Date Value Ref Range Status   04/06/2019 137 132 - 146 mmol/L Final   04/05/2019 138 132 - 146 mmol/L Final      Potassium Potassium   Date Value Ref Range Status   04/06/2019 4.2 3.5 - 5.5 mmol/L Final   04/05/2019 5.1 3.5 - 5.5 mmol/L Final      Chloride Chloride   Date Value Ref Range Status   04/06/2019 104 99 - 109 mmol/L Final   04/05/2019 105 99 - 109 mmol/L Final      CO2 CO2   Date Value Ref Range Status   04/06/2019 23.0 20.0 - 31.0 mmol/L Final   04/05/2019 23.0 20.0 - 31.0 mmol/L Final      BUN BUN   Date Value Ref Range Status   04/06/2019 46 (H) 9 - 23 mg/dL Final   04/05/2019 78 (H) 9 - 23 mg/dL Final      Creatinine Creatinine   Date Value Ref Range Status   04/06/2019 3.40 (H) 0.60 - 1.30 mg/dL Final   04/05/2019 4.49 (H) 0.60 - 1.30 mg/dL Final      Calcium Calcium   Date Value Ref Range Status   04/06/2019 9.0 8.7 - 10.4 mg/dL Final   04/05/2019 9.1 8.7 - 10.4 mg/dL Final      PO4 No results found for: CAPO4   Albumin Albumin   Date Value Ref Range Status   04/05/2019 3.54 3.20 - 4.80 g/dL Final      Magnesium No results found for: MG   Uric Acid No results found for: URICACID        Results Review:     I reviewed the patient's new clinical results.      alteplase 2 mg Intracatheter Once in Dialysis   alteplase 2 mg Intracatheter Once in Dialysis   amLODIPine 10 mg Oral Daily   amoxicillin-clavulanate 1 tablet Oral Q24H   atorvastatin 10 mg Oral Nightly   budesonide-formoterol 2 puff Inhalation BID - RT   calcitriol 0.25 mcg Oral Daily   carvedilol 25 mg Oral BID With Meals   cetirizine 10 mg Oral Daily   docusate sodium 100 mg Oral BID   doxycycline 100 mg Oral Q12H   hydrALAZINE 100 mg Oral Q8H   isosorbide mononitrate 30 mg Oral Daily   melatonin 5  mg Oral Nightly   pantoprazole 40 mg Oral QAM   psyllium 1 packet Oral Daily   saccharomyces boulardii 250 mg Oral BID   sertraline 50 mg Oral Daily   sevelamer 800 mg Oral TID With Meals   simethicone 80 mg Oral Q6H   sodium chloride 3 mL Intravenous Q12H   warfarin 8 mg Oral Daily       heparin (porcine) 13 Units/kg/hr Last Rate: 13 Units/kg/hr (04/06/19 0812)   Pharmacy to Dose Heparin     Pharmacy to dose warfarin         Medication Review:     Assessment/Plan       URI (upper respiratory infection)    Essential hypertension    Type 2 diabetes mellitus with chronic kidney disease on chronic dialysis, with long-term current use of insulin (CMS/HCC)    Chronic diastolic heart failure (CMS/HCC)    History of deep vein thrombosis (DVT) of brachial vein of left upper extremity (CMS/HCC)    ESRD (end stage renal disease) (CMS/HCC)    Chest wall abscess    Subtherapeutic international normalized ratio (INR)      1- ESRD - MWF   2- HTn - controlled.   3- Anemia of chronic disease. At target  4- CKD MBD      Plan:  - Patient seen on dialysis. UF as tolerated.   - Renal diet      I discussed the patients findings and my recommendations with patient and nursing staff          Jackson Welch MD  04/06/19  9:03 AM

## 2019-04-06 NOTE — PLAN OF CARE
Problem: Patient Care Overview  Goal: Plan of Care Review  VSS. Normal Sinus. Room air. Heparin gtt rate is 13units/kg/hr.Pt went to dialysis this am. Got up in chair this afternoon. Will continue to monitor.

## 2019-04-06 NOTE — PROGRESS NOTES
[facility] Medicine Services  PROGRESS NOTE    Patient Name: Joy Bueno  : 1950  MRN: [id number]    Date of Admission: 2019  Length of Stay: 0  Primary Care Physician: Provider, No Known    Subjective   Subjective     CC:  Volume overload    HPI:  S/p HD last night and again this morning.  Denies SOA or complaints.  Looks well.     ROS:  Otherwise ROS is negative except as mentioned in the HPI.    Objective   Objective     Vital Signs:   Temp:  [96.7 °F (35.9 °C)-98.3 °F (36.8 °C)] 97.4 °F (36.3 °C)  Heart Rate:  [62-96] 70  Resp:  [20-24] 20  BP: (133-170)/() 170/100        Physical Exam:  Constitutional: No acute distress, awake, alert, nontoxic, obese body habitus  Respiratory: Clear to auscultation bilaterally, no crackles or rales, good effort, nonlabored respirations   Cardiovascular: RRR  Musculoskeletal: Trace peripheral edema, normal muscle tone for age  Psychiatric: Appropriate affect, good insight and judgement, cooperative  Skin: No rashes, no jaundice, no petechiae, no mottling      Results Reviewed:  I have personally reviewed current lab, radiology, and data and agree.    Results from last 7 days   Lab Units 19  1553 19  0717   WBC 10*3/mm3 17.05*  --  11.66*   HEMOGLOBIN g/dL 9.9*  --  11.4*   HEMATOCRIT % 33.1*  --  37.7   PLATELETS 10*3/mm3 279  --  289   INR  1.60* 1.46* 1.37*     Results from last 7 days   Lab Units 19  0443 19  0845 19  0717   SODIUM mmol/L 137  --  138   POTASSIUM mmol/L 4.2  --  5.1   CHLORIDE mmol/L 104  --  105   CO2 mmol/L 23.0  --  23.0   BUN mg/dL 46*  --  78*   CREATININE mg/dL 3.40*  --  4.49*   GLUCOSE mg/dL 233*  --  153*   CALCIUM mg/dL 9.0  --  9.1   ALT (SGPT) U/L  --   --  13   AST (SGOT) U/L  --   --  11   TROPONIN I ng/mL  --  0.00  --      Estimated Creatinine Clearance: 21.7 mL/min (A) (by C-G formula based on SCr of 3.4 mg/dL (H)).  BNP   Date Value Ref Range Status    04/05/2019 113.0 (H) 0.0 - 100.0 pg/mL Final     Comment:     Results may be falsely decreased if patient taking Biotin.       Microbiology Results Abnormal     Procedure Component Value - Date/Time    Wound Culture - Wound, Chest [[id number]]  (Abnormal) Collected:  04/05/19 2105    Lab Status:  Preliminary result Specimen:  Wound from Chest Updated:  04/06/19 1023     Wound Culture Light growth (2+) Gram Negative Bacilli     Gram Stain Rare (1+) WBCs seen      Few (2+) Gram negative bacilli    Influenza A & B, RT PCR - Swab, Nasopharynx [[id number]]  (Normal) Collected:  04/05/19 0718    Lab Status:  Final result Specimen:  Swab from Nasopharynx Updated:  04/05/19 0827     Influenza A PCR Not Detected     Influenza B PCR Not Detected          Imaging Results (last 24 hours)     ** No results found for the last 24 hours. **        Results for orders placed during the hospital encounter of 09/02/17   Adult Transthoracic Echo Complete    Narrative · Left ventricular wall thickness is consistent with mild concentric   hypertrophy.  · Left atrial cavity size is mildly dilated.  · Mild mitral valve regurgitation is present  · calcification of the aortic valve  · Mild tricuspid valve regurgitation is present.          I have reviewed the medications.    Current Facility-Administered Medications:   •  acetaminophen (TYLENOL) tablet 650 mg, 650 mg, Oral, Q4H PRN, Kelli Worrell MD  •  albumin human 25 % IV SOLN 25 g, 25 g, Intravenous, PRN, YuriJackson MD, 50 g at 04/05/19 1346  •  amLODIPine (NORVASC) tablet 10 mg, 10 mg, Oral, Daily, Kelli Worrell MD, 10 mg at 04/06/19 1104  •  amoxicillin-clavulanate (AUGMENTIN) 500-125 MG per tablet 500 mg, 1 tablet, Oral, Q24H, Krystin Byers MD, 500 mg at 04/05/19 2235  •  atorvastatin (LIPITOR) tablet 10 mg, 10 mg, Oral, Nightly, Kelli Worrell MD, 10 mg at 04/05/19 2058  •  budesonide-formoterol (SYMBICORT) 160-4.5 MCG/ACT inhaler 2 puff, 2 puff, Inhalation, BID -  RT, Kelli Worrell MD, 2 puff at 04/05/19 2039  •  calcitriol (ROCALTROL) capsule 0.25 mcg, 0.25 mcg, Oral, Daily, Kelli Worrell MD, 0.25 mcg at 04/06/19 1103  •  carvedilol (COREG) tablet 25 mg, 25 mg, Oral, BID With Meals, Kelli Worrell MD, 25 mg at 04/06/19 1104  •  cetirizine (zyrTEC) tablet 10 mg, 10 mg, Oral, Daily, Kelli Worrell MD, 10 mg at 04/06/19 1104  •  docusate sodium (COLACE) capsule 100 mg, 100 mg, Oral, BID, Kelli Worrell MD  •  doxycycline (MONODOX) capsule 100 mg, 100 mg, Oral, Q12H, Krystin Byers MD, 100 mg at 04/06/19 1103  •  heparin 39999 units/250 mL (100 units/mL) in 0.45 % NaCl infusion, 13 Units/kg/hr, Intravenous, Titrated, Mirna Howard, [facility], Last Rate: 17.16 mL/hr at 04/06/19 0812, 13 Units/kg/hr at 04/06/19 0812  •  hydrALAZINE (APRESOLINE) tablet 100 mg, 100 mg, Oral, Q8H, Kelli Worrell MD, 100 mg at 04/06/19 0557  •  ipratropium-albuterol (DUO-NEB) nebulizer solution 3 mL, 3 mL, Nebulization, Q6H PRN, Kelli Worrell MD, 3 mL at 04/05/19 2321  •  isosorbide mononitrate (IMDUR) 24 hr tablet 30 mg, 30 mg, Oral, Daily, Kelli Worrell MD, 30 mg at 04/06/19 1104  •  melatonin tablet 5 mg, 5 mg, Oral, Nightly, Kelli Worrell MD, 5 mg at 04/05/19 2058  •  ondansetron (ZOFRAN) tablet 4 mg, 4 mg, Oral, Q6H PRN **OR** ondansetron (ZOFRAN) injection 4 mg, 4 mg, Intravenous, Q6H PRN, Kelli Worrell MD  •  pantoprazole (PROTONIX) EC tablet 40 mg, 40 mg, Oral, QAM, Kelli Worrell MD, 40 mg at 04/06/19 0557  •  Pharmacy to Dose Heparin, , Does not apply, Continuous PRN, Kelli Worrell MD  •  Pharmacy to dose warfarin, , Does not apply, Continuous PRN, Kelli Worrell MD  •  polyethylene glycol 3350 powder (packet), 17 g, Oral, Daily PRN, Kelli Worrell MD  •  psyllium (KONSYL) pack 1 packet, 1 packet, Oral, Daily, Kelli Worrell MD, 1 packet at 04/06/19 1105  •  saccharomyces boulardii (FLORASTOR) capsule 250 mg, 250 mg, Oral, BID, Kelli Worrell MD, 250 mg at  04/06/19 1105  •  sertraline (ZOLOFT) tablet 50 mg, 50 mg, Oral, Daily, Kelli Worrell MD, 50 mg at 04/06/19 1103  •  sevelamer (RENVELA) packet 0.8 g, 800 mg, Oral, TID With Meals, Kelli Worrell MD, 0.8 g at 04/06/19 1155  •  simethicone (MYLICON) chewable tablet 80 mg, 80 mg, Oral, Q6H, Kelli Worrell MD, 80 mg at 04/06/19 1155  •  [COMPLETED] Insert peripheral IV, , , Once **AND** sodium chloride 0.9 % flush 10 mL, 10 mL, Intravenous, PRN, Tarun Evans MD  •  sodium chloride 0.9 % flush 3 mL, 3 mL, Intravenous, Q12H, Kelli Worrell MD, 3 mL at 04/05/19 2059  •  sodium chloride 0.9 % flush 3-10 mL, 3-10 mL, Intravenous, PRN, Kelli Worrell MD  •  warfarin (COUMADIN) tablet 8 mg, 8 mg, Oral, Daily, David Welch, [facility], 8 mg at 04/05/19 1840    Assessment/Plan   Assessment / Plan     Active Hospital Problems    Diagnosis POA   • **Volume overload [E87.70] Yes   • URI (upper respiratory infection) [J06.9] Yes   • ESRD (end stage renal disease) (CMS/[facility]) [N18.6] Yes   • Chest wall abscess [L02.213] Yes   • Subtherapeutic international normalized ratio (INR) [R79.1] Yes   • History of deep vein thrombosis (DVT) of brachial vein of left upper extremity (CMS/[facility]) [I82.622] Yes     Dx February 2019     • Anticoagulated on Coumadin [Z51.81, Z79.01] Not Applicable   • Chronic diastolic heart failure (CMS/[facility]) [I50.32] Yes   • Anemia of chronic renal failure, stage 5 (CMS/[facility]) [N18.5, D63.1] Yes   • Type 2 diabetes mellitus with chronic kidney disease on chronic dialysis, with long-term current use of insulin (CMS/[facility]) [E11.22, N18.6, Z99.2, Z79.4] Not Applicable   • Essential hypertension [I10] Yes   • Morbid obesity (CMS/HCC) [E66.01] Yes            Brief Hospital Course to date:  Joy Bueno is a 68 y.o. female with hx of HTN, chronic diastolic CHF, ESRD on HD (started October 2018), DM2, recurrent UTI's (ESBL Klebsiella), anemia of chronic disease, LUE brachial DVT (Feb 2019, on Coumadin), and  prior C.diff infection presents to the ER due to URI symptoms for the past 3 weeks.  Due to not feeling well, she missed dialysis on Wednesday and missed it again Friday, presenting to [facility] ED when she became progressively SOA. In the ER, CXR was unremarkable, labs showed mild leukocytosis and subtherapeutic INR 1.37. Nephrology was called and recommended admission for dialysis since she will be unable to get it again until Monday.      Volume overload due to missed HD x2  - s/p HD  - now return to normal M/W/F schedule    Subtherapeutic INR  Recent DVT dx in Feb 2019  - hep gtt bridging   - Pharm to dose    Inframammary chest wall cutaneous abscess  - s/p I&D in ED with packing in place -->  Will need to send instructions for daily packing until healed to SNF at d/c  - PO Augmentin and Doxy x 10 days    URI, likely viral   - supportive care  - if atypical will be covered by Doxy course    DVT Prophylaxis:  Hep gtt, coumadin bridging    Disposition: I expect the patient to be discharged via EMS back to SNF Monday (doubt will take her back on Sunday) after HD. In interim, will work to get INR therapeutic and perform packing to abscess.      CODE STATUS:   Code Status and Medical Interventions:   Ordered at: 04/05/19 1154     Code Status:    CPR     Medical Interventions (Level of Support Prior to Arrest):    Full           Electronically signed by Krystin Byers MD, 04/06/19, 2:16 PM.

## 2019-04-07 LAB
GLUCOSE BLDC GLUCOMTR-MCNC: 189 MG/DL (ref 70–130)
GLUCOSE BLDC GLUCOMTR-MCNC: 210 MG/DL (ref 70–130)
GLUCOSE BLDC GLUCOMTR-MCNC: 220 MG/DL (ref 70–130)
GLUCOSE BLDC GLUCOMTR-MCNC: 265 MG/DL (ref 70–130)
INR PPP: 1.74 (ref 0.85–1.16)
PROTHROMBIN TIME: 19.6 SECONDS (ref 11.2–14.3)
UFH PPP CHRO-ACNC: 0.29 IU/ML (ref 0.3–0.7)
UFH PPP CHRO-ACNC: 0.33 IU/ML (ref 0.3–0.7)
UFH PPP CHRO-ACNC: 0.65 IU/ML (ref 0.3–0.7)

## 2019-04-07 PROCEDURE — 82962 GLUCOSE BLOOD TEST: CPT

## 2019-04-07 PROCEDURE — G0378 HOSPITAL OBSERVATION PER HR: HCPCS

## 2019-04-07 PROCEDURE — 96366 THER/PROPH/DIAG IV INF ADDON: CPT

## 2019-04-07 PROCEDURE — 85610 PROTHROMBIN TIME: CPT | Performed by: HOSPITALIST

## 2019-04-07 PROCEDURE — 94799 UNLISTED PULMONARY SVC/PX: CPT

## 2019-04-07 PROCEDURE — 25010000002 HEPARIN (PORCINE) IN NACL 25000-0.45 UT/250ML-% SOLUTION

## 2019-04-07 PROCEDURE — 99225 PR SBSQ OBSERVATION CARE/DAY 25 MINUTES: CPT | Performed by: FAMILY MEDICINE

## 2019-04-07 PROCEDURE — 85520 HEPARIN ASSAY: CPT

## 2019-04-07 RX ADMIN — Medication 250 MG: at 09:25

## 2019-04-07 RX ADMIN — MELATONIN TAB 5 MG 5 MG: 5 TAB at 21:35

## 2019-04-07 RX ADMIN — INSULIN LISPRO 6 UNITS: 100 INJECTION, SOLUTION INTRAVENOUS; SUBCUTANEOUS at 13:14

## 2019-04-07 RX ADMIN — DOXYCYCLINE 100 MG: 100 CAPSULE ORAL at 21:33

## 2019-04-07 RX ADMIN — SIMETHICONE CHEW TAB 80 MG 80 MG: 80 TABLET ORAL at 18:04

## 2019-04-07 RX ADMIN — DOCUSATE SODIUM 100 MG: 100 CAPSULE, LIQUID FILLED ORAL at 09:25

## 2019-04-07 RX ADMIN — SODIUM CHLORIDE, PRESERVATIVE FREE 3 ML: 5 INJECTION INTRAVENOUS at 09:26

## 2019-04-07 RX ADMIN — HYDRALAZINE HYDROCHLORIDE 100 MG: 25 TABLET ORAL at 21:33

## 2019-04-07 RX ADMIN — PANTOPRAZOLE SODIUM 40 MG: 40 TABLET, DELAYED RELEASE ORAL at 06:01

## 2019-04-07 RX ADMIN — INSULIN LISPRO 4 UNITS: 100 INJECTION, SOLUTION INTRAVENOUS; SUBCUTANEOUS at 18:05

## 2019-04-07 RX ADMIN — PSYLLIUM HUSK 1 PACKET: 3.5 GRANULE ORAL at 09:24

## 2019-04-07 RX ADMIN — HEPARIN SODIUM 14 UNITS/KG/HR: 10000 INJECTION, SOLUTION INTRAVENOUS at 13:09

## 2019-04-07 RX ADMIN — CETIRIZINE HYDROCHLORIDE 10 MG: 10 TABLET, FILM COATED ORAL at 09:25

## 2019-04-07 RX ADMIN — AMLODIPINE BESYLATE 10 MG: 10 TABLET ORAL at 09:24

## 2019-04-07 RX ADMIN — WARFARIN SODIUM 8 MG: 4 TABLET ORAL at 18:04

## 2019-04-07 RX ADMIN — HYDRALAZINE HYDROCHLORIDE 100 MG: 25 TABLET ORAL at 06:01

## 2019-04-07 RX ADMIN — CALCITRIOL 0.25 MCG: 0.25 CAPSULE ORAL at 09:24

## 2019-04-07 RX ADMIN — DOXYCYCLINE 100 MG: 100 CAPSULE ORAL at 09:25

## 2019-04-07 RX ADMIN — SIMETHICONE CHEW TAB 80 MG 80 MG: 80 TABLET ORAL at 06:01

## 2019-04-07 RX ADMIN — SEVELAMER CARBONATE 0.8 G: 800 POWDER, FOR SUSPENSION ORAL at 13:13

## 2019-04-07 RX ADMIN — BUDESONIDE AND FORMOTEROL FUMARATE DIHYDRATE 2 PUFF: 160; 4.5 AEROSOL RESPIRATORY (INHALATION) at 06:20

## 2019-04-07 RX ADMIN — HYDRALAZINE HYDROCHLORIDE 100 MG: 25 TABLET ORAL at 13:09

## 2019-04-07 RX ADMIN — SERTRALINE HYDROCHLORIDE 50 MG: 50 TABLET ORAL at 09:25

## 2019-04-07 RX ADMIN — CARVEDILOL 25 MG: 12.5 TABLET, FILM COATED ORAL at 09:25

## 2019-04-07 RX ADMIN — CARVEDILOL 25 MG: 12.5 TABLET, FILM COATED ORAL at 18:04

## 2019-04-07 RX ADMIN — BUDESONIDE AND FORMOTEROL FUMARATE DIHYDRATE 2 PUFF: 160; 4.5 AEROSOL RESPIRATORY (INHALATION) at 19:58

## 2019-04-07 RX ADMIN — INSULIN LISPRO 4 UNITS: 100 INJECTION, SOLUTION INTRAVENOUS; SUBCUTANEOUS at 09:25

## 2019-04-07 RX ADMIN — IPRATROPIUM BROMIDE AND ALBUTEROL SULFATE 3 ML: 2.5; .5 SOLUTION RESPIRATORY (INHALATION) at 06:20

## 2019-04-07 RX ADMIN — SEVELAMER CARBONATE 0.8 G: 800 POWDER, FOR SUSPENSION ORAL at 18:05

## 2019-04-07 RX ADMIN — SODIUM CHLORIDE, PRESERVATIVE FREE 3 ML: 5 INJECTION INTRAVENOUS at 21:34

## 2019-04-07 RX ADMIN — DOCUSATE SODIUM 100 MG: 100 CAPSULE, LIQUID FILLED ORAL at 21:33

## 2019-04-07 RX ADMIN — Medication 250 MG: at 21:33

## 2019-04-07 RX ADMIN — AMOXICILLIN AND CLAVULANATE POTASSIUM 500 MG: 500; 125 TABLET, FILM COATED ORAL at 18:04

## 2019-04-07 RX ADMIN — SIMETHICONE CHEW TAB 80 MG 80 MG: 80 TABLET ORAL at 13:09

## 2019-04-07 RX ADMIN — INSULIN LISPRO 2 UNITS: 100 INJECTION, SOLUTION INTRAVENOUS; SUBCUTANEOUS at 21:35

## 2019-04-07 RX ADMIN — SIMETHICONE CHEW TAB 80 MG 80 MG: 80 TABLET ORAL at 23:15

## 2019-04-07 RX ADMIN — SIMETHICONE CHEW TAB 80 MG 80 MG: 80 TABLET ORAL at 00:09

## 2019-04-07 RX ADMIN — ATORVASTATIN CALCIUM 10 MG: 10 TABLET, FILM COATED ORAL at 21:33

## 2019-04-07 RX ADMIN — SEVELAMER CARBONATE 0.8 G: 800 POWDER, FOR SUSPENSION ORAL at 09:25

## 2019-04-07 RX ADMIN — ISOSORBIDE MONONITRATE 30 MG: 30 TABLET, EXTENDED RELEASE ORAL at 09:25

## 2019-04-07 NOTE — PROGRESS NOTES
Continued Stay Note  [facility]     Patient Name: Joy Bueno  MRN: [id number]  Today's Date: 4/7/2019    Admit Date: 4/5/2019    Discharge Plan     Row Name 04/07/19 1431       Plan    Plan  [facility]    Patient/Family in Agreement with Plan  yes    Plan Comments  Pt may leave on Monday according to MD consult.  [facility] has been arranged for 3pm on Monday 4/8/2019. PCS is in chart.  CM will need to contact [facility] and verify pt placement as well as [facility] as per previous note.      Final Discharge Disposition Code  03 - skilled nursing facility (SNF)        Discharge Codes    No documentation.             Sabra Espinoza, RN

## 2019-04-07 NOTE — PLAN OF CARE
Problem: Fall Risk (Adult)  Goal: Identify Related Risk Factors and Signs and Symptoms  Outcome: Ongoing (interventions implemented as appropriate)    Goal: Absence of Fall  Outcome: Ongoing (interventions implemented as appropriate)      Problem: Patient Care Overview  Goal: Individualization and Mutuality  Outcome: Ongoing (interventions implemented as appropriate)    Goal: Discharge Needs Assessment  Outcome: Ongoing (interventions implemented as appropriate)    Goal: Interprofessional Rounds/Family Conf  Outcome: Ongoing (interventions implemented as appropriate)      Problem: Skin Injury Risk (Adult)  Goal: Identify Related Risk Factors and Signs and Symptoms  Outcome: Ongoing (interventions implemented as appropriate)    Goal: Skin Health and Integrity  Outcome: Ongoing (interventions implemented as appropriate)

## 2019-04-07 NOTE — PLAN OF CARE
Problem: Fall Risk (Adult)  Goal: Absence of Fall  Outcome: Ongoing (interventions implemented as appropriate)      Problem: Patient Care Overview  Goal: Plan of Care Review  Outcome: Ongoing (interventions implemented as appropriate)   04/07/19 0327   Coping/Psychosocial   Plan of Care Reviewed With patient   Plan of Care Review   Progress no change   OTHER   Outcome Summary VSS. RA. NSR on monitor. Heparin gtt continued. C/o pain under breast where abcess was drained - prn tylenol given. Will continue to monitor.        Problem: Skin Injury Risk (Adult)  Goal: Skin Health and Integrity  Outcome: Ongoing (interventions implemented as appropriate)

## 2019-04-07 NOTE — PROGRESS NOTES
"   LOS: 0 days    Patient Care Team:  Provider, No Known as PCP - General        Subjective     Interval History:     No acute events overnight. No new complaints.     Review of Systems:   No CP or SOA    Objective     Vital Sign Min/Max for last 24 hours  Temp  Min: 97.4 °F (36.3 °C)  Max: 98.6 °F (37 °C)   BP  Min: 123/54  Max: 170/100   Pulse  Min: 66  Max: 86   Resp  Min: 16  Max: 20   SpO2  Min: 94 %  Max: 100 %   No Data Recorded   No Data Recorded     Flowsheet Rows      First Filed Value   Admission Height  162.6 cm (64\") Documented at 04/05/2019 0624   Admission Weight  132 kg (290 lb) Documented at 04/05/2019 0624          I/O this shift:  In: 360 [P.O.:360]  Out: -   I/O last 3 completed shifts:  In: 1630 [P.O.:1320; I.V.:310]  Out: 1120 [Urine:740; Other:380]    Physical Exam:     General Appearance:    Alert, cooperative, in no acute distress   Head:    Normocephalic, without obvious abnormality, atraumatic               Neck:   No adenopathy, supple, trachea midline, no thyromegaly, no     carotid bruit, no JVD       Lungs:     Clear to auscultation,respirations regular, even and                   unlabored    Heart:    Regular rhythm and normal rate, normal S1 and S2, no            murmur, no gallop, no rub, no click   Breast Exam:    Deferred   Abdomen:     Normal bowel sounds, no masses, no organomegaly, soft        non-tender, non-distended, no guarding, no rebound                 tenderness   Genitalia:    Deferred   Extremities:   Moves all extremities well, no edema, no cyanosis, no              redness               Neurologic:   No focal deficit noted.        WBC WBC   Date Value Ref Range Status   04/06/2019 17.05 (H) 3.50 - 10.80 10*3/mm3 Final   04/05/2019 11.66 (H) 3.50 - 10.80 10*3/mm3 Final      HGB Hemoglobin   Date Value Ref Range Status   04/06/2019 9.9 (L) 11.5 - 15.5 g/dL Final   04/05/2019 11.4 (L) 11.5 - 15.5 g/dL Final      HCT Hematocrit   Date Value Ref Range Status "   04/06/2019 33.1 (L) 34.5 - 44.0 % Final   04/05/2019 37.7 34.5 - 44.0 % Final      Platlets No results found for: LABPLAT   MCV MCV   Date Value Ref Range Status   04/06/2019 94.6 80.0 - 99.0 fL Final   04/05/2019 95.0 80.0 - 99.0 fL Final          Sodium Sodium   Date Value Ref Range Status   04/06/2019 137 132 - 146 mmol/L Final   04/05/2019 138 132 - 146 mmol/L Final      Potassium Potassium   Date Value Ref Range Status   04/06/2019 4.2 3.5 - 5.5 mmol/L Final   04/05/2019 5.1 3.5 - 5.5 mmol/L Final      Chloride Chloride   Date Value Ref Range Status   04/06/2019 104 99 - 109 mmol/L Final   04/05/2019 105 99 - 109 mmol/L Final      CO2 CO2   Date Value Ref Range Status   04/06/2019 23.0 20.0 - 31.0 mmol/L Final   04/05/2019 23.0 20.0 - 31.0 mmol/L Final      BUN BUN   Date Value Ref Range Status   04/06/2019 46 (H) 9 - 23 mg/dL Final   04/05/2019 78 (H) 9 - 23 mg/dL Final      Creatinine Creatinine   Date Value Ref Range Status   04/06/2019 3.40 (H) 0.60 - 1.30 mg/dL Final   04/05/2019 4.49 (H) 0.60 - 1.30 mg/dL Final      Calcium Calcium   Date Value Ref Range Status   04/06/2019 9.0 8.7 - 10.4 mg/dL Final   04/05/2019 9.1 8.7 - 10.4 mg/dL Final      PO4 No results found for: CAPO4   Albumin Albumin   Date Value Ref Range Status   04/05/2019 3.54 3.20 - 4.80 g/dL Final      Magnesium No results found for: MG   Uric Acid No results found for: URICACID        Results Review:     I reviewed the patient's new clinical results.      amLODIPine 10 mg Oral Daily   amoxicillin-clavulanate 1 tablet Oral Q24H   atorvastatin 10 mg Oral Nightly   budesonide-formoterol 2 puff Inhalation BID - RT   calcitriol 0.25 mcg Oral Daily   carvedilol 25 mg Oral BID With Meals   cetirizine 10 mg Oral Daily   docusate sodium 100 mg Oral BID   doxycycline 100 mg Oral Q12H   hydrALAZINE 100 mg Oral Q8H   insulin lispro 0-9 Units Subcutaneous 4x Daily With Meals & Nightly   isosorbide mononitrate 30 mg Oral Daily   melatonin 5 mg Oral  Nightly   pantoprazole 40 mg Oral QAM   psyllium 1 packet Oral Daily   saccharomyces boulardii 250 mg Oral BID   sertraline 50 mg Oral Daily   sevelamer 800 mg Oral TID With Meals   simethicone 80 mg Oral Q6H   sodium chloride 3 mL Intravenous Q12H   warfarin 8 mg Oral Daily       heparin (porcine) 14 Units/kg/hr Last Rate: 14 Units/kg/hr (04/07/19 0927)   Pharmacy to Dose Heparin     Pharmacy to dose warfarin         Medication Review:     Assessment/Plan       Volume overload    Essential hypertension    Morbid obesity (CMS/HCC)    Type 2 diabetes mellitus with chronic kidney disease on chronic dialysis, with long-term current use of insulin (CMS/[facility])    Anemia of chronic renal failure, stage 5 (CMS/[facility])    Chronic diastolic heart failure (CMS/[facility])    History of deep vein thrombosis (DVT) of brachial vein of left upper extremity (CMS/[facility])    Anticoagulated on Coumadin    URI (upper respiratory infection)    ESRD (end stage renal disease) (CMS/[facility])    Chest wall abscess    Subtherapeutic international normalized ratio (INR)      1- ESRD - MWF   2- HTn - controlled.   3- Anemia of chronic disease. At target  4- CKD MBD      Plan:  - HD per schedule.   - UF as tolerated.   - Renal diet.   - Epo with HD.      I discussed the patients findings and my recommendations with patient and nursing staff          Jackson Welch MD  04/07/19  9:33 AM

## 2019-04-07 NOTE — PROGRESS NOTES
"Pharmacy Consult  -  Warfarin  Joy Bueno is a  68 y.o. female   Height - 162.6 cm (64\")  Weight - 132 kg (290 lb)    Consulting Provider: - Dr. Worrell  Indication: - DVT  Goal INR: - 2-3  Home Regimen:   - 8mg daily     Bridge Therapy: Yes   and unfractionated heparin    Drug-Drug Interactions with current regimen:   Amoxicillin- increased risk of bleeding              Doxycycline- increased risk of bleeding     Warfarin Dosing During Admission:  Date  4/5 4/6 4/7         INR  1.37 1.6 1.74         Dose  8mg 8mg (8mg)            Education Provided: Patient will be going to SNF who will manage their warfarin     Labs:  Results from last 7 days   Lab Units 04/07/19  0538 04/06/19  0443 04/05/19  1553 04/05/19  0717   INR  1.74* 1.60* 1.46* 1.37*   APTT seconds  --   --   --  39.4*   HEMOGLOBIN g/dL  --  9.9*  --  11.4*   HEMATOCRIT %  --  33.1*  --  37.7   PLATELETS 10*3/mm3  --  279  --  289     Results from last 7 days   Lab Units 04/06/19  0443 04/05/19  0717   SODIUM mmol/L 137 138   POTASSIUM mmol/L 4.2 5.1   CHLORIDE mmol/L 104 105   CO2 mmol/L 23.0 23.0   BUN mg/dL 46* 78*   CREATININE mg/dL 3.40* 4.49*   CALCIUM mg/dL 9.0 9.1   BILIRUBIN mg/dL  --  0.3   ALK PHOS U/L  --  89   ALT (SGPT) U/L  --  13   AST (SGOT) U/L  --  11   GLUCOSE mg/dL 233* 153*     Current dietary intake: 100% intake documented in the last 24 hours  Diet Order   Procedures   • Diet Regular; Cardiac, Consistent Carbohydrate, Renal     Assessment/Plan:   1. Pharmacy to dose warfarin for indication of DVT. Patient was previously on warfarin at home taking 8mg daily. Goal INR 2-3. Bridging with unfractionated heparin.   2. INR steadily rising, but SUBtherapeutic at 1.74.   3. Continuing home dose of 8mg daily.   4. Daily PT/INR  5. Pharmacy will continue to follow.     Thanks,  David Welch, PharmD  Pharmacy Resident  4/7/2019  8:11 AM            "

## 2019-04-07 NOTE — PROGRESS NOTES
[facility] Medicine Services  PROGRESS NOTE    Patient Name: Joy Bueno  : 1950  MRN: [id number]    Date of Admission: 2019  Length of Stay: 0  Primary Care Physician: Provider, No Known    Subjective   Subjective     CC:  Volume overload    HPI:  Denies SOA or complaints.  Looks well. Occasional resolving dry cough.    ROS:  Otherwise ROS is negative except as mentioned in the HPI.    Objective   Objective     Vital Signs:   Temp:  [98.2 °F (36.8 °C)-98.6 °F (37 °C)] 98.2 °F (36.8 °C)  Heart Rate:  [67-76] 71  Resp:  [16-18] 18  BP: (123-147)/(54-66) 140/62        Physical Exam:  Constitutional: No acute distress, awake, alert, nontoxic, obese body habitus  Respiratory: Clear to auscultation bilaterally, no crackles or rales, good effort, nonlabored respirations   Cardiovascular: RRR  Musculoskeletal: Trace peripheral edema, normal muscle tone for age  Psychiatric: Appropriate affect, good insight and judgement, cooperative  Skin: inframammary abscess s/p I&D with packing      Results Reviewed:  I have personally reviewed current lab, radiology, and data and agree.    Results from last 7 days   Lab Units 19  0538 19  1553 19  0717   WBC 10*3/mm3  --  17.05*  --  11.66*   HEMOGLOBIN g/dL  --  9.9*  --  11.4*   HEMATOCRIT %  --  33.1*  --  37.7   PLATELETS 10*3/mm3  --  279  --  289   INR  1.74* 1.60* 1.46* 1.37*     Results from last 7 days   Lab Units 19  0443 19  0845 19  0717   SODIUM mmol/L 137  --  138   POTASSIUM mmol/L 4.2  --  5.1   CHLORIDE mmol/L 104  --  105   CO2 mmol/L 23.0  --  23.0   BUN mg/dL 46*  --  78*   CREATININE mg/dL 3.40*  --  4.49*   GLUCOSE mg/dL 233*  --  153*   CALCIUM mg/dL 9.0  --  9.1   ALT (SGPT) U/L  --   --  13   AST (SGOT) U/L  --   --  11   TROPONIN I ng/mL  --  0.00  --      Estimated Creatinine Clearance: 21.7 mL/min (A) (by C-G formula based on SCr of 3.4 mg/dL (H)).  BNP   Date Value  Ref Range Status   04/05/2019 113.0 (H) 0.0 - 100.0 pg/mL Final     Comment:     Results may be falsely decreased if patient taking Biotin.       Microbiology Results Abnormal     Procedure Component Value - Date/Time    Wound Culture - Wound, Chest [[id number]]  (Abnormal)  (Susceptibility) Collected:  04/05/19 2105    Lab Status:  Preliminary result Specimen:  Wound from Chest Updated:  04/07/19 0950     Wound Culture Light growth (2+) Proteus mirabilis     Gram Stain Rare (1+) WBCs seen      Few (2+) Gram negative bacilli    Susceptibility      Proteus mirabilis     AL (Preliminary)     Ampicillin Susceptible     Ampicillin + Sulbactam Susceptible     Aztreonam Susceptible     Cefepime Susceptible     Cefotaxime Susceptible     Ceftriaxone Susceptible     Cefuroxime sodium Susceptible     Ertapenem Susceptible     Gentamicin Susceptible     Levofloxacin Susceptible     Meropenem Susceptible     Piperacillin + Tazobactam Susceptible     Tobramycin Susceptible     Trimethoprim + Sulfamethoxazole Susceptible                    Influenza A & B, RT PCR - Swab, Nasopharynx [[id number]]  (Normal) Collected:  04/05/19 0718    Lab Status:  Final result Specimen:  Swab from Nasopharynx Updated:  04/05/19 0827     Influenza A PCR Not Detected     Influenza B PCR Not Detected          Imaging Results (last 24 hours)     ** No results found for the last 24 hours. **        Results for orders placed during the hospital encounter of 09/02/17   Adult Transthoracic Echo Complete    Narrative · Left ventricular wall thickness is consistent with mild concentric   hypertrophy.  · Left atrial cavity size is mildly dilated.  · Mild mitral valve regurgitation is present  · calcification of the aortic valve  · Mild tricuspid valve regurgitation is present.          I have reviewed the medications.    Current Facility-Administered Medications:   •  acetaminophen (TYLENOL) tablet 650 mg, 650 mg, Oral, Q4H PRN, Kelli Worrell MD, 650 mg  at 04/06/19 2240  •  amLODIPine (NORVASC) tablet 10 mg, 10 mg, Oral, Daily, Kelli Worrell MD, 10 mg at 04/07/19 0924  •  amoxicillin-clavulanate (AUGMENTIN) 500-125 MG per tablet 500 mg, 1 tablet, Oral, Q24H, Krystin Byers MD, 500 mg at 04/06/19 1611  •  atorvastatin (LIPITOR) tablet 10 mg, 10 mg, Oral, Nightly, Kelli Worrell MD, 10 mg at 04/06/19 2104  •  budesonide-formoterol (SYMBICORT) 160-4.5 MCG/ACT inhaler 2 puff, 2 puff, Inhalation, BID - RT, Kelli Worrell MD, 2 puff at 04/07/19 0620  •  calcitriol (ROCALTROL) capsule 0.25 mcg, 0.25 mcg, Oral, Daily, Kelli Worrell MD, 0.25 mcg at 04/07/19 0924  •  carvedilol (COREG) tablet 25 mg, 25 mg, Oral, BID With Meals, Kelli Worrell MD, 25 mg at 04/07/19 0925  •  cetirizine (zyrTEC) tablet 10 mg, 10 mg, Oral, Daily, Kelli Worrell MD, 10 mg at 04/07/19 0925  •  dextrose (D50W) 25 g/ 50mL Intravenous Solution 25 g, 25 g, Intravenous, Q15 Min PRN, Krystin Byers MD  •  dextrose (GLUTOSE) oral gel 15 g, 15 g, Oral, Q15 Min PRN, Krystin Byers MD  •  docusate sodium (COLACE) capsule 100 mg, 100 mg, Oral, BID, Kelli Worrell MD, 100 mg at 04/07/19 0925  •  doxycycline (MONODOX) capsule 100 mg, 100 mg, Oral, Q12H, Krystin Byers MD, 100 mg at 04/07/19 0925  •  glucagon (human recombinant) (GLUCAGEN DIAGNOSTIC) injection 1 mg, 1 mg, Subcutaneous, PRN, Krystin Byers MD  •  heparin 05648 units/250 mL (100 units/mL) in 0.45 % NaCl infusion, 14 Units/kg/hr, Intravenous, Titrated, David Welch, [facility], Last Rate: 18.48 mL/hr at 04/07/19 1309, 14 Units/kg/hr at 04/07/19 1309  •  hydrALAZINE (APRESOLINE) tablet 100 mg, 100 mg, Oral, Q8H, Kelil Worrell MD, 100 mg at 04/07/19 1309  •  insulin lispro (humaLOG) injection 0-9 Units, 0-9 Units, Subcutaneous, 4x Daily With Meals & Nightly, Krystin Byers MD, 6 Units at 04/07/19 1314  •  ipratropium-albuterol (DUO-NEB) nebulizer solution 3 mL, 3 mL, Nebulization, Q6H PRN, Kelli Worrell MD, 3 mL at 04/07/19 0620  •   isosorbide mononitrate (IMDUR) 24 hr tablet 30 mg, 30 mg, Oral, Daily, Kelli Worrell MD, 30 mg at 04/07/19 0925  •  melatonin tablet 5 mg, 5 mg, Oral, Nightly, Kelli Worrell MD, 5 mg at 04/06/19 2104  •  ondansetron (ZOFRAN) tablet 4 mg, 4 mg, Oral, Q6H PRN **OR** ondansetron (ZOFRAN) injection 4 mg, 4 mg, Intravenous, Q6H PRN, Kelli Worrell MD  •  pantoprazole (PROTONIX) EC tablet 40 mg, 40 mg, Oral, QAM, Kelli Worrell MD, 40 mg at 04/07/19 0601  •  Pharmacy to Dose Heparin, , Does not apply, Continuous PRN, Kelli Worrell MD  •  Pharmacy to dose warfarin, , Does not apply, Continuous PRN, Kelli Worrell MD  •  polyethylene glycol 3350 powder (packet), 17 g, Oral, Daily PRN, Kelli Worrell MD  •  psyllium (KONSYL) pack 1 packet, 1 packet, Oral, Daily, Kelli Worrell MD, 1 packet at 04/07/19 0924  •  saccharomyces boulardii (FLORASTOR) capsule 250 mg, 250 mg, Oral, BID, Kelli Worrell MD, 250 mg at 04/07/19 0925  •  sertraline (ZOLOFT) tablet 50 mg, 50 mg, Oral, Daily, Kelli Worrell MD, 50 mg at 04/07/19 0925  •  sevelamer (RENVELA) packet 0.8 g, 800 mg, Oral, TID With Meals, Kelli Worrell MD, 0.8 g at 04/07/19 1313  •  simethicone (MYLICON) chewable tablet 80 mg, 80 mg, Oral, Q6H, Kelli Worrell MD, 80 mg at 04/07/19 1309  •  [COMPLETED] Insert peripheral IV, , , Once **AND** sodium chloride 0.9 % flush 10 mL, 10 mL, Intravenous, PRN, Tarun Evans MD  •  sodium chloride 0.9 % flush 3 mL, 3 mL, Intravenous, Q12H, Kelli Worrell MD, 3 mL at 04/07/19 0926  •  sodium chloride 0.9 % flush 3-10 mL, 3-10 mL, Intravenous, PRN, Kelli Worrell MD  •  warfarin (COUMADIN) tablet 8 mg, 8 mg, Oral, Daily, David Welch, [facility], 8 mg at 04/06/19 4639    Assessment/Plan   Assessment / Plan     Active Hospital Problems    Diagnosis POA   • **Volume overload [E87.70] Yes   • URI (upper respiratory infection) [J06.9] Yes   • ESRD (end stage renal disease) (CMS/HCC) [N18.6] Yes   • Chest wall  abscess [L02.213] Yes   • Subtherapeutic international normalized ratio (INR) [R79.1] Yes   • History of deep vein thrombosis (DVT) of brachial vein of left upper extremity (CMS/[facility]) [I82.622] Yes     Dx February 2019     • Anticoagulated on Coumadin [Z51.81, Z79.01] Not Applicable   • Chronic diastolic heart failure (CMS/[facility]) [I50.32] Yes   • Anemia of chronic renal failure, stage 5 (CMS/[facility]) [N18.5, D63.1] Yes   • Type 2 diabetes mellitus with chronic kidney disease on chronic dialysis, with long-term current use of insulin (CMS/[facility]) [E11.22, N18.6, Z99.2, Z79.4] Not Applicable   • Essential hypertension [I10] Yes   • Morbid obesity (CMS/[facility]) [E66.01] Yes            Brief Hospital Course to date:  Joy Bueno is a 68 y.o. female with hx of HTN, chronic diastolic CHF, ESRD on HD (started October 2018), DM2, recurrent UTI's (ESBL Klebsiella), anemia of chronic disease, LUE brachial DVT (Feb 2019, on Coumadin), and prior C.diff infection presents to the ER due to URI symptoms for the past 3 weeks.  Due to not feeling well, she missed dialysis on Wednesday and missed it again Friday, presenting to [facility] ED when she became progressively SOA. In the ER, CXR was unremarkable, labs showed mild leukocytosis and subtherapeutic INR 1.37. Nephrology was called and recommended admission for dialysis since she will be unable to get it again until Monday.      Volume overload due to missed HD x2  - s/p HD x 2  - now return to normal M/W/F schedule    Subtherapeutic INR  Recent DVT dx in Feb 2019  - hep gtt bridging -->  Planning for d/c Monday after HD even if INR not quite 2 as it seems to be reliably increasing and Pharmacy can advise d/c dose and SNF can recheck INR in ~2 days  - Pharm to dose    Inframammary chest wall cutaneous abscess  - s/p I&D in ED with packing in place.  Will ask nursing to re-pack today -->  Will need to send instructions for daily packing until healed to SNF at d/c  - PO Augmentin and Doxy x 10  days    URI, likely viral   - supportive care  - if atypical will be covered by Doxy course    DVT Prophylaxis:  Hep gtt, coumadin bridging    Disposition: I expect the patient to be discharged via EMS back to SNF Monday after HD. In interim, will work to get INR (nearer) therapeutic and perform packing to abscess.      CODE STATUS:   Code Status and Medical Interventions:   Ordered at: 04/05/19 1154     Code Status:    CPR     Medical Interventions (Level of Support Prior to Arrest):    Full           Electronically signed by Krystin Byers MD, 04/07/19, 2:21 PM.

## 2019-04-07 NOTE — PROGRESS NOTES
HEPARIN INFUSION  Joy Bueno is a  68 y.o. female receiving heparin infusion.     Therapy for VTE  Patient Weight: 132kg  Initial Bolus (Y/N):   Y  Any Bolus (Y/N):   Y        Signs or Symptoms of Bleeding: N  VTE (PE/DVT)   Initial Bolus: 80 units/kg (Max 10,000 units)  Initial rate: 18 units/kg/hr (Max 1,500 units/hr)      (Anti-Xa) (IU/mL) Bolus Dose Stop Infusion Rate Change Repeat (Anti-Xa)     ? 0.19 80 units/kg 0 hrs Increase rate by   4 units/kg/hr 6 hrs      0.2 - 0.29 40 units/kg 0 hrs Increase rate by 2 units/kg/hr 6 hrs    0.3 - 0.7  0 0 hrs No change 6 hrs      0.71 - 0.99   0  0 hrs Decrease rate by 2 units/kg/hr 6 hrs      ? 1 0 Hold 1 hr Decrease rate by 3 units/kg/hr 6 hrs      Results from last 7 days   Lab Units 04/06/19  0443 04/05/19  1553 04/05/19  0717   INR  1.60* 1.46* 1.37*   HEMOGLOBIN g/dL 9.9*  --  11.4*   HEMATOCRIT % 33.1*  --  37.7   PLATELETS 10*3/mm3 279  --  289       Date   Time   Anti-Xa Current Rate (Unit/kg/hr) Bolus   (Units) Rate Change   (Unit/kg/hr) New Rate (Unit/kg/hr) Next   Anti-Xa Comments  Pump Check Daily   4/5 1055 pending -- 8000 +11.3 11.3 pending Spoke with ED Rn. Patient about to be transferred to dialysis. Will be started when they go to floor    4/5 2214 0.43 11.3 - - 11.3 [address]    4/6 0443 0.23 11.3 5000 +2 13 [address]   4/6 1408 0.53 13 -- -- 13 [address] Siddiqui. Pump weight and rate checked   4/6 2018 0.46 13 -- -- 13 0600 D/w ADIL[address] Fernandez    4/7 0538 0.33 13 -- +1 14 [address] Peters. Pump weight and rate verified                                                                                                                                                                           Thanks,  David Welch, PharmD  Pharmacy Resident  4/7/2019  1:59 PM

## 2019-04-08 ENCOUNTER — APPOINTMENT (OUTPATIENT)
Dept: NEPHROLOGY | Facility: HOSPITAL | Age: 69
End: 2019-04-08

## 2019-04-08 VITALS
HEART RATE: 72 BPM | BODY MASS INDEX: 50.02 KG/M2 | DIASTOLIC BLOOD PRESSURE: 66 MMHG | RESPIRATION RATE: 15 BRPM | SYSTOLIC BLOOD PRESSURE: 139 MMHG | HEIGHT: 64 IN | OXYGEN SATURATION: 98 % | WEIGHT: 293 LBS | TEMPERATURE: 96.4 F

## 2019-04-08 LAB
GLUCOSE BLDC GLUCOMTR-MCNC: 151 MG/DL (ref 70–130)
GLUCOSE BLDC GLUCOMTR-MCNC: 168 MG/DL (ref 70–130)
INR PPP: 1.97 (ref 0.85–1.16)
PROTHROMBIN TIME: 21.5 SECONDS (ref 11.2–14.3)
UFH PPP CHRO-ACNC: 0.5 IU/ML (ref 0.3–0.7)

## 2019-04-08 PROCEDURE — G0378 HOSPITAL OBSERVATION PER HR: HCPCS

## 2019-04-08 PROCEDURE — 99217 PR OBSERVATION CARE DISCHARGE MANAGEMENT: CPT | Performed by: INTERNAL MEDICINE

## 2019-04-08 PROCEDURE — 94799 UNLISTED PULMONARY SVC/PX: CPT

## 2019-04-08 PROCEDURE — 82962 GLUCOSE BLOOD TEST: CPT

## 2019-04-08 PROCEDURE — 85610 PROTHROMBIN TIME: CPT | Performed by: HOSPITALIST

## 2019-04-08 PROCEDURE — 25010000002 HEPARIN (PORCINE) PER 1000 UNITS

## 2019-04-08 PROCEDURE — 85520 HEPARIN ASSAY: CPT

## 2019-04-08 PROCEDURE — G0257 UNSCHED DIALYSIS ESRD PT HOS: HCPCS

## 2019-04-08 PROCEDURE — 63510000001 EPOETIN ALFA PER 1000 UNITS: Performed by: INTERNAL MEDICINE

## 2019-04-08 PROCEDURE — 25010000002 ALTEPLASE 2 MG RECONSTITUTED SOLUTION 1 EACH VIAL: Performed by: INTERNAL MEDICINE

## 2019-04-08 PROCEDURE — 96366 THER/PROPH/DIAG IV INF ADDON: CPT

## 2019-04-08 RX ORDER — ALBUMIN (HUMAN) 12.5 G/50ML
12.5 SOLUTION INTRAVENOUS AS NEEDED
Status: DISCONTINUED | OUTPATIENT
Start: 2019-04-08 | End: 2019-04-08 | Stop reason: SDUPTHER

## 2019-04-08 RX ORDER — ALBUMIN (HUMAN) 12.5 G/50ML
12.5 SOLUTION INTRAVENOUS AS NEEDED
Status: DISCONTINUED | OUTPATIENT
Start: 2019-04-08 | End: 2019-04-08 | Stop reason: HOSPADM

## 2019-04-08 RX ORDER — AMOXICILLIN AND CLAVULANATE POTASSIUM 500; 125 MG/1; MG/1
1 TABLET, FILM COATED ORAL
Qty: 7 TABLET | Refills: 0
Start: 2019-04-08 | End: 2019-04-15

## 2019-04-08 RX ORDER — DOXYCYCLINE 100 MG/1
100 CAPSULE ORAL EVERY 12 HOURS SCHEDULED
Qty: 4 CAPSULE | Refills: 0
Start: 2019-04-08 | End: 2019-04-10

## 2019-04-08 RX ORDER — SENNOSIDES 8.6 MG
2 TABLET ORAL 2 TIMES DAILY PRN
Status: DISCONTINUED | OUTPATIENT
Start: 2019-04-08 | End: 2019-04-08 | Stop reason: HOSPADM

## 2019-04-08 RX ADMIN — SIMETHICONE CHEW TAB 80 MG 80 MG: 80 TABLET ORAL at 11:06

## 2019-04-08 RX ADMIN — HYDRALAZINE HYDROCHLORIDE 100 MG: 25 TABLET ORAL at 13:27

## 2019-04-08 RX ADMIN — ERYTHROPOIETIN 10000 UNITS: 10000 INJECTION, SOLUTION INTRAVENOUS; SUBCUTANEOUS at 09:33

## 2019-04-08 RX ADMIN — SERTRALINE HYDROCHLORIDE 50 MG: 50 TABLET ORAL at 09:33

## 2019-04-08 RX ADMIN — ISOSORBIDE MONONITRATE 30 MG: 30 TABLET, EXTENDED RELEASE ORAL at 09:33

## 2019-04-08 RX ADMIN — PANTOPRAZOLE SODIUM 40 MG: 40 TABLET, DELAYED RELEASE ORAL at 09:34

## 2019-04-08 RX ADMIN — BUDESONIDE AND FORMOTEROL FUMARATE DIHYDRATE 2 PUFF: 160; 4.5 AEROSOL RESPIRATORY (INHALATION) at 10:54

## 2019-04-08 RX ADMIN — Medication 250 MG: at 09:34

## 2019-04-08 RX ADMIN — PSYLLIUM HUSK 1 PACKET: 3.5 GRANULE ORAL at 10:48

## 2019-04-08 RX ADMIN — AMLODIPINE BESYLATE 10 MG: 10 TABLET ORAL at 09:34

## 2019-04-08 RX ADMIN — DOXYCYCLINE 100 MG: 100 CAPSULE ORAL at 09:34

## 2019-04-08 RX ADMIN — DOCUSATE SODIUM 100 MG: 100 CAPSULE, LIQUID FILLED ORAL at 09:33

## 2019-04-08 RX ADMIN — INSULIN LISPRO 2 UNITS: 100 INJECTION, SOLUTION INTRAVENOUS; SUBCUTANEOUS at 12:23

## 2019-04-08 RX ADMIN — POLYETHYLENE GLYCOL 3350 17 G: 17 POWDER, FOR SOLUTION ORAL at 10:56

## 2019-04-08 RX ADMIN — CARVEDILOL 25 MG: 12.5 TABLET, FILM COATED ORAL at 09:33

## 2019-04-08 RX ADMIN — CALCITRIOL 0.25 MCG: 0.25 CAPSULE ORAL at 09:33

## 2019-04-08 RX ADMIN — IPRATROPIUM BROMIDE AND ALBUTEROL SULFATE 3 ML: 2.5; .5 SOLUTION RESPIRATORY (INHALATION) at 00:05

## 2019-04-08 RX ADMIN — SENNOSIDES 17.2 MG: 8.6 TABLET, FILM COATED ORAL at 11:06

## 2019-04-08 RX ADMIN — SIMETHICONE CHEW TAB 80 MG 80 MG: 80 TABLET ORAL at 09:33

## 2019-04-08 RX ADMIN — SEVELAMER CARBONATE 0.8 G: 800 POWDER, FOR SUSPENSION ORAL at 10:48

## 2019-04-08 RX ADMIN — WATER: 1 INJECTION INTRAMUSCULAR; INTRAVENOUS; SUBCUTANEOUS at 10:21

## 2019-04-08 RX ADMIN — INSULIN LISPRO 2 UNITS: 100 INJECTION, SOLUTION INTRAVENOUS; SUBCUTANEOUS at 08:05

## 2019-04-08 RX ADMIN — SODIUM CHLORIDE, PRESERVATIVE FREE 3 ML: 5 INJECTION INTRAVENOUS at 08:40

## 2019-04-08 RX ADMIN — HEPARIN SODIUM 16 UNITS/KG/HR: 10000 INJECTION, SOLUTION INTRAVENOUS at 01:41

## 2019-04-08 RX ADMIN — CETIRIZINE HYDROCHLORIDE 10 MG: 10 TABLET, FILM COATED ORAL at 09:34

## 2019-04-08 NOTE — DISCHARGE PLACEMENT REQUEST
"Pam Loyd (68 y.o. Female)     Date of Birth Social Security Number Address Home Phone MRN    1950  [address]  [address]  [place] [address] [phone] [id number]    Christianity Marital Status          None        Admission Date Admission Type Admitting Provider Attending Provider Department, Room/Bed    4/5/19 Emergency Nanda Gonzalez DO Barbato, Hayley R, DO [facility] 6A, N612/1    Discharge Date Discharge Disposition Discharge Destination                       Attending Provider:  Nanda Gonzalez DO    Allergies:  Geodon [Ziprasidone Hcl], Haldol [Haloperidol Lactate], Seroquel [Quetiapine Fumarate]    Isolation:  Contact   Infection:  ESBL Klebsiella (08/05/18)   Code Status:  CPR    Ht:  162.6 cm (64\")   Wt:  136 kg (300 lb 9.6 oz)    Admission Cmt:  None   Principal Problem:  Volume overload [E87.70]                 Active Insurance as of 4/5/2019     Primary Coverage     Payor Plan Insurance Group Employer/Plan Group    MEDICARE MEDICARE A & B      Payor Plan Address Payor Plan Phone Number Payor Plan Fax Number Effective Dates    [address] [phone]  5/1/2016 - None Entered    [facility] [id number]       Subscriber Name Subscriber Birth Date Member ID       PAM LOYD 1950 [id number]           Secondary Coverage     Payor Plan Insurance Group Employer/Plan Group    [place]      Payor Plan Address Payor Plan Phone Number Payor Plan Fax Number Effective Dates    [address] [phone]  8/2/2018 - None Entered    [place] [address]       Subscriber Name Subscriber Birth Date Member ID       PAM LOYD 1950 [id number]                 Emergency Contacts      (Rel.) Home Phone Work Phone Mobile Phone    Tessie Dorado (Daughter) [phone] -- [phone]               History & Physical      Kelli Worrell MD at 4/5/2019 11:55 AM              [facility] Medicine " Services  HISTORY AND PHYSICAL    Patient Name: Joy Bueno  : 1950  MRN: [id number]  Primary Care Physician: Provider, No Known  Date of admission: 2019      Subjective   Subjective     Chief Complaint:  Cough, congestion    HPI:  Joy Bueno is a 68 y.o. female with hx of HTN, chronic diastolic CHF, ESRD on HD (started 2018), DM2, recurrent UTI's (ESBL Klebsiella), anemia of chronic disease, LUE brachial DVT (2019, on Coumadin), and prior C.diff infection presents to the ER due to URI symptoms for the past 3 weeks. Her PCP called in an antibiotic for her yesterday but she is not sure what it was and has not picked it up yet. Cough is productive of yellow sputum. Has some mild SOA. Due to not feeling well, she missed dialysis on Wednesday and missed it again this morning. She also complains of a new bump on her right chest underneath her breast, noticed it a couple days ago and now complains of pain. Denies fevers or chills. In the ER, CXR was unremarkable. Labs showed mild leukocytosis and subtherapeutic INR 1.37. Nephrology was called and recommended admission for dialysis since she will be unable to get it again until Monday.    Of note patient had a posterior soft tissue neck abscess that drained spontaneously last admission, cultures grew Proteus mirabilis and Staph lugdunensis.    Review of Systems     Otherwise complete ROS reviewed and is negative except as mentioned in the HPI.    Personal History     Past Medical History:   Diagnosis Date   • Acute on chronic diastolic heart failure (CMS/HCC)    • Acute on chronic heart failure (CMS/HCC)    • Anemia    • Anxiety    • Asthma    • Chronic diastolic heart failure (CMS/HCC)    • Chronic kidney disease    • Diabetes mellitus (CMS/HCC)    • Diabetes mellitus, type II (CMS/HCC) 8/3/2018   • History of Clostridium difficile infection 8/3/2018   • History of respiratory failure, since off home oxygen 8/3/2018   • History of UTI  8/3/2018   • Hypertension    • Morbid obesity (CMS/HCC)    • Osteoarthritis    • Tinea capitis 8/3/2018       Past Surgical History:   Procedure Laterality Date   • ARTERIOVENOUS FISTULA/SHUNT SURGERY Left 10/22/2018    Procedure: LEFT UPPER EXTREMITY ARTERIOVENOUS FISTULA CREATION;  Surgeon: Carlos Enrique Calderón MD;  Location:  [facility];  Service: Vascular   •  SECTION     • COLONOSCOPY N/A 2017    Procedure: COLONOSCOPY;  Surgeon: Mark I Brunner, MD;  Location:  [facility];  Service:    • ENDOSCOPY N/A 2017    Procedure: ESOPHAGOGASTRODUODENOSCOPY ;  Surgeon: Velasquez Call MD;  Location:  [facility];  Service:    • HERNIA REPAIR     • INSERTION HEMODIALYSIS CATHETER Right 10/17/2018    Procedure: HEMODIALYSIS CATHETER INSERTION;  Surgeon: Carlos Enrique Calderón MD;  Location:  [facility];  Service: General       Family History: family history includes Cardiomyopathy in her mother; Diabetes in her father; Stroke in her father. Otherwise pertinent FHx was reviewed and unremarkable.     Social History:  reports that she has quit smoking. She smoked 0.25 packs per day. She does not have any smokeless tobacco history on file. She reports that she does not drink alcohol or use drugs.  Social History     Social History Narrative    Mrs. Bueno is a 68 year old AA  female. She lives alone in [place] but has been in rehab for some time. She has a daughter. She does not have an advanced directive.       Medications:    Available home medication information reviewed.    (Not in a hospital admission)    Allergies   Allergen Reactions   • Geodon [Ziprasidone Hcl] Unknown (See Comments)     PT DOESN'T REMEMBER   • Haldol [Haloperidol Lactate] Unknown (See Comments)     PT DOESN'T REMEMBER   • Seroquel [Quetiapine Fumarate] Unknown (See Comments)     PT DOESN'T REMEMBER       Objective   Objective     Vital Signs:   Temp:  [96.5 °F (35.8 °C)-98.1 °F (36.7 °C)] 96.5 °F (35.8 °C)  Heart  Rate:  [67-75] 75  Resp:  [18-20] 18  BP: (126-140)/(61-74) 136/63        Physical Exam   Gen-no acute distress, seen on dialysis  HENT-NCAT, mucous membranes moist  CV-RRR, S1 S2 normal, no m/r/g  Resp-CTAB, no wheezes or rales  Abd-soft, NT, ND, +BS  Ext-no edema  Neuro-A&Ox3, no focal deficits  Skin-no rashes; approximately 3 cm raised area just to the right of the sternum/mid chest with some fluctuance and warmth; tender to palpation  Psych-appropriate mood      Results Reviewed:  I have personally reviewed current lab, radiology, and data and agree.    Results from last 7 days   Lab Units 04/05/19  0717   WBC 10*3/mm3 11.66*   HEMOGLOBIN g/dL 11.4*   HEMATOCRIT % 37.7   PLATELETS 10*3/mm3 289   INR  1.37*     Results from last 7 days   Lab Units 04/05/19  0845 04/05/19  0717   SODIUM mmol/L  --  138   POTASSIUM mmol/L  --  5.1   CHLORIDE mmol/L  --  105   CO2 mmol/L  --  23.0   BUN mg/dL  --  78*   CREATININE mg/dL  --  4.49*   GLUCOSE mg/dL  --  153*   CALCIUM mg/dL  --  9.1   ALT (SGPT) U/L  --  13   AST (SGOT) U/L  --  11   TROPONIN I ng/mL 0.00  --      Estimated Creatinine Clearance: 16.4 mL/min (A) (by C-G formula based on SCr of 4.49 mg/dL (H)).  Brief Urine Lab Results  (Last result in the past 365 days)      Color   Clarity   Blood   Leuk Est   Nitrite   Protein   CREAT   Urine HCG        02/11/19 1417 Yellow Cloudy Trace Large (3+) Negative 100 mg/dL (2+)             BNP   Date Value Ref Range Status   04/05/2019 113.0 (H) 0.0 - 100.0 pg/mL Final     Comment:     Results may be falsely decreased if patient taking Biotin.     Imaging Results (last 24 hours)     Procedure Component Value Units Date/Time    XR Chest 1 View [[id number]] Collected:  04/05/19 0830     Updated:  04/05/19 1149    Narrative:       EXAMINATION: XR CHEST 1 VW-04/05/2019:      INDICATION: SOB.     TECHNIQUE:  Single view frontal chest.     COMPARISONS: 02/13/2019.     FINDINGS:  Unchanged tunneled right IJ dialysis catheter.  Cardiomegaly;  double density overlying the cardiac silhouette probably indicates left  atrial enlargement. The left hemidiaphragm silhouette is obscured,  probably due to atelectasis. No sizable pleural effusion. No  pneumothorax.       Impression:       Cardiomegaly and left basilar atelectasis.     D:  04/05/2019  E:  04/05/2019     This report was finalized on 4/5/2019 11:45 AM by Mracus Garcia.           Results for orders placed during the hospital encounter of 09/02/17   Adult Transthoracic Echo Complete    Narrative · Left ventricular wall thickness is consistent with mild concentric   hypertrophy.  · Left atrial cavity size is mildly dilated.  · Mild mitral valve regurgitation is present  · calcification of the aortic valve  · Mild tricuspid valve regurgitation is present.          Assessment/Plan   Assessment / Plan     Active Hospital Problems    Diagnosis POA   • **URI (upper respiratory infection) [J06.9] Yes   • ESRD (end stage renal disease) (CMS/[facility]) [N18.6] Yes   • Chest wall abscess [L02.213] Yes   • Subtherapeutic international normalized ratio (INR) [R79.1] Yes   • History of deep vein thrombosis (DVT) of brachial vein of left upper extremity (CMS/[facility]) [I82.622] Yes   • Chronic diastolic heart failure (CMS/[facility]) [I50.32] Yes   • Type 2 diabetes mellitus with chronic kidney disease on chronic dialysis, with long-term current use of insulin (CMS/[facility]) [E11.22, N18.6, Z99.2, Z79.4] Not Applicable   • Essential hypertension [I10] Yes     Ms. Bueno is a 67 yo F with hx of HTN, chronic diastolic CHF, ESRD on HD (started October 2018), DM2, recurrent UTI's (ESBL Klebsiella), anemia of chronic disease, LUE brachial DVT (Feb 2019, on Coumadin), and prior C.diff infection presents to the ER due to URI symptoms for the past 3 weeks. She has missed 2 dialysis sessions due to not feeling well. She will be admitted to receive dialysis. Also found to have subtherapeutic INR along with a small chest wall  abscess.    PLAN:  --Nephrology following, dialysis started. No signs of decompensation, no pulmonary edema or respiratory distress. Continue MWF.  --Unfortunately cannot use Lovenox to bridge in ESRD, so will start heparin drip and continue Coumadin until INR >2.0. Pharmacy to dose.  --Dr. Evans plans to I&D the chest abscess. Will send for culture. I will plan to start patient on Doxycycline and Augmentin empirically to cover for the abscess as well as any lung infection, but will wait for culture to be collected.      DVT prophylaxis:  Coumadin/heparin drip    CODE STATUS:    Code Status and Medical Interventions:   Ordered at: 04/05/19 1154     Code Status:    CPR     Medical Interventions (Level of Support Prior to Arrest):    Full       Admission Status:  I believe this patient meets OBSERVATION status, however if further evaluation or treatment plans warrant, status may change.  Based upon current information, I predict patient's care encounter to be less than or equal to 2 midnights.      Electronically signed by Kelli Worrell MD, 04/05/19, 12:34 PM            Electronically signed by Kelli Worrell MD at 4/5/2019 12:35 PM       Hospital Medications (active)       Dose Frequency Start End    acetaminophen (TYLENOL) tablet 650 mg 650 mg Every 4 Hours PRN 4/5/2019     Sig - Route: Take 2 tablets by mouth Every 4 (Four) Hours As Needed for Mild Pain . - Oral    albumin human 25 % IV SOLN 12.5 g 12.5 g As Needed 4/8/2019 4/9/2019    Sig - Route: Infuse 50 mL into a venous catheter As Needed (Hypotension During Dialysis). - Intravenous    amLODIPine (NORVASC) tablet 10 mg 10 mg Daily 4/6/2019     Sig - Route: Take 1 tablet by mouth Daily. - Oral    amoxicillin-clavulanate (AUGMENTIN) 500-125 MG per tablet 500 mg 1 tablet Every 24 Hours Scheduled 4/5/2019 4/15/2019    Sig - Route: Take 1 tablet by mouth Daily. - Oral    atorvastatin (LIPITOR) tablet 10 mg 10 mg Nightly 4/5/2019     Sig - Route: Take 1 tablet  by mouth Every Night. - Oral    budesonide-formoterol (SYMBICORT) 160-4.5 MCG/ACT inhaler 2 puff 2 puff 2 Times Daily - RT 4/5/2019     Sig - Route: Inhale 2 puffs 2 (Two) Times a Day. - Inhalation    calcitriol (ROCALTROL) capsule 0.25 mcg 0.25 mcg Daily 4/6/2019     Sig - Route: Take 1 capsule by mouth Daily. - Oral    carvedilol (COREG) tablet 25 mg 25 mg 2 Times Daily With Meals 4/5/2019     Sig - Route: Take 2 tablets by mouth 2 (Two) Times a Day With Meals. - Oral    cetirizine (zyrTEC) tablet 10 mg 10 mg Daily 4/6/2019     Sig - Route: Take 1 tablet by mouth Daily. - Oral    dextrose (D50W) 25 g/ 50mL Intravenous Solution 25 g 25 g Every 15 Minutes PRN 4/6/2019     Sig - Route: Infuse 50 mL into a venous catheter Every 15 (Fifteen) Minutes As Needed for Low Blood Sugar (Blood Sugar Less Than 70). - Intravenous    dextrose (GLUTOSE) oral gel 15 g 15 g Every 15 Minutes PRN 4/6/2019     Sig - Route: Take 15 g by mouth Every 15 (Fifteen) Minutes As Needed for Low Blood Sugar (Blood sugar less than 70). - Oral    docusate sodium (COLACE) capsule 100 mg 100 mg 2 Times Daily 4/5/2019     Sig - Route: Take 1 capsule by mouth 2 (Two) Times a Day. - Oral    doxycycline (MONODOX) capsule 100 mg 100 mg Every 12 Hours Scheduled 4/5/2019 4/10/2019    Sig - Route: Take 1 capsule by mouth Every 12 (Twelve) Hours. - Oral    epoetin porter (EPOGEN,PROCRIT) injection 10,000 Units 10,000 Units 3 Times Weekly 4/8/2019     Sig - Route: Inject 1 mL under the skin into the appropriate area as directed Once per day on Mon Wed Fri. - Subcutaneous    glucagon (human recombinant) (GLUCAGEN DIAGNOSTIC) injection 1 mg 1 mg As Needed 4/6/2019     Sig - Route: Inject 1 mg under the skin into the appropriate area as directed As Needed (Blood Glucose Less Than 70). - Subcutaneous    heparin 17481 units/250 mL (100 units/mL) in 0.45 % NaCl infusion 16 Units/kg/hr × 132 kg Titrated 4/5/2019     Sig - Route: Infuse 2,112 Units/hr into a venous  "catheter Dose Adjusted By Provider As Needed. - Intravenous    Notes to Pharmacy: Don't dispense to ED. Patient about to go to dialysis. Will have to follow and send to floor afterwards    hydrALAZINE (APRESOLINE) tablet 100 mg 100 mg Every 8 Hours Scheduled 4/5/2019     Sig - Route: Take 4 tablets by mouth Every 8 (Eight) Hours. - Oral    insulin lispro (humaLOG) injection 0-9 Units 0-9 Units 4 Times Daily With Meals & Nightly 4/6/2019     Sig - Route: Inject 0-9 Units under the skin into the appropriate area as directed 4 (Four) Times a Day With Meals & at Bedtime. - Subcutaneous    ipratropium-albuterol (DUO-NEB) nebulizer solution 3 mL 3 mL Every 6 Hours PRN 4/5/2019     Sig - Route: Take 3 mL by nebulization Every 6 (Six) Hours As Needed for Wheezing. - Nebulization    isosorbide mononitrate (IMDUR) 24 hr tablet 30 mg 30 mg Daily 4/5/2019     Sig - Route: Take 1 tablet by mouth Daily. - Oral    melatonin tablet 5 mg 5 mg Nightly 4/5/2019     Sig - Route: Take 1 tablet by mouth Every Night. - Oral    ondansetron (ZOFRAN) injection 4 mg 4 mg Every 6 Hours PRN 4/5/2019     Sig - Route: Infuse 2 mL into a venous catheter Every 6 (Six) Hours As Needed for Nausea or Vomiting. - Intravenous    Linked Group 1:  \"Or\" Linked Group Details        ondansetron (ZOFRAN) tablet 4 mg 4 mg Every 6 Hours PRN 4/5/2019     Sig - Route: Take 1 tablet by mouth Every 6 (Six) Hours As Needed for Nausea or Vomiting. - Oral    Linked Group 1:  \"Or\" Linked Group Details        pantoprazole (PROTONIX) EC tablet 40 mg 40 mg Every Morning 4/5/2019     Sig - Route: Take 1 tablet by mouth Every Morning. - Oral    Pharmacy to Dose Heparin  Continuous PRN 4/5/2019     Sig - Route: Continuous As Needed for Consult. - Does not apply    Pharmacy to dose warfarin  Continuous PRN 4/5/2019     Sig - Route: Continuous As Needed for Consult. - Does not apply    polyethylene glycol 3350 powder (packet) 17 g Daily PRN 4/5/2019     Sig - Route: Take 17 g " "by mouth Daily As Needed for Constipation. - Oral    psyllium (KONSYL) pack 1 packet 1 packet Daily 4/6/2019     Sig - Route: Take 1 packet by mouth Daily. - Oral    saccharomyces boulardii (FLORASTOR) capsule 250 mg 250 mg 2 Times Daily 4/5/2019     Sig - Route: Take 1 capsule by mouth 2 (Two) Times a Day. - Oral    sertraline (ZOLOFT) tablet 50 mg 50 mg Daily 4/5/2019     Sig - Route: Take 1 tablet by mouth Daily. - Oral    sevelamer (RENVELA) packet 0.8 g 800 mg 3 Times Daily With Meals 4/5/2019     Sig - Route: Take 1 packet by mouth 3 (Three) Times a Day With Meals. - Oral    simethicone (MYLICON) chewable tablet 80 mg 80 mg Every 6 Hours 4/5/2019     Sig - Route: Chew 1 tablet Every 6 (Six) Hours. - Oral    sodium chloride 0.9 % flush 10 mL 10 mL As Needed 4/5/2019     Sig - Route: Infuse 10 mL into a venous catheter As Needed for Line Care. - Intravenous    Linked Group 2:  \"And\" Linked Group Details        sodium chloride 0.9 % flush 3 mL 3 mL Every 12 Hours Scheduled 4/5/2019     Sig - Route: Infuse 3 mL into a venous catheter Every 12 (Twelve) Hours. - Intravenous    sodium chloride 0.9 % flush 3-10 mL 3-10 mL As Needed 4/5/2019     Sig - Route: Infuse 3-10 mL into a venous catheter As Needed for Line Care. - Intravenous    warfarin (COUMADIN) tablet 8 mg 8 mg Daily Warfarin 4/5/2019     Sig - Route: Take 2 tablets by mouth Daily. - Oral    albumin human 25 % IV SOLN 12.5 g (Discontinued) 12.5 g As Needed 4/8/2019 4/8/2019    Sig - Route: Infuse 50 mL into a venous catheter As Needed (Hypotension During Dialysis). - Intravenous    Reason for Discontinue: Duplicate order    albumin human 25 % IV SOLN 12.5 g (Discontinued) 12.5 g As Needed 4/8/2019 4/8/2019    Sig - Route: Infuse 50 mL into a venous catheter As Needed (Hypotension During Dialysis). - Intravenous    Reason for Discontinue: Duplicate order             Physician Progress Notes (last 24 hours) (Notes from 4/7/2019  9:25 AM through 4/8/2019  9:25 " "AM)      Jackson Welch MD at 4/8/2019  9:12 AM             LOS: 0 days    Patient Care Team:  Provider, No Known as PCP - General        Subjective     Interval History:     No acute events overnight. No new complaints.     Review of Systems:   No CP or SOA    Objective     Vital Sign Min/Max for last 24 hours  Temp  Min: 97 °F (36.1 °C)  Max: 98.2 °F (36.8 °C)   BP  Min: 123/61  Max: 163/80   Pulse  Min: 64  Max: 73   Resp  Min: 14  Max: 20   SpO2  Min: 97 %  Max: 98 %   No Data Recorded   Weight  Min: 136 kg (300 lb 9.6 oz)  Max: 136 kg (300 lb 9.6 oz)     Flowsheet Rows      First Filed Value   Admission Height  162.6 cm (64\") Documented at 04/05/2019 0624   Admission Weight  132 kg (290 lb) Documented at 04/05/2019 0624          No intake/output data recorded.  I/O last 3 completed shifts:  In: 1080 [P.O.:1080]  Out: 400 [Urine:400]    Physical Exam:     General Appearance:    Alert, cooperative, in no acute distress   Head:    Normocephalic, without obvious abnormality, atraumatic               Neck:   No adenopathy, supple, trachea midline, no thyromegaly, no     carotid bruit, no JVD       Lungs:     Clear to auscultation,respirations regular, even and                   unlabored    Heart:    Regular rhythm and normal rate, normal S1 and S2, no            murmur, no gallop, no rub, no click   Breast Exam:    Deferred   Abdomen:     Normal bowel sounds, no masses, no organomegaly, soft        non-tender, non-distended, no guarding, no rebound                 tenderness   Genitalia:    Deferred   Extremities:   Moves all extremities well, no edema, no cyanosis, no              redness               Neurologic:   No focal deficit noted.        WBC WBC   Date Value Ref Range Status   04/06/2019 17.05 (H) 3.50 - 10.80 10*3/mm3 Final      HGB Hemoglobin   Date Value Ref Range Status   04/06/2019 9.9 (L) 11.5 - 15.5 g/dL Final      HCT Hematocrit   Date Value Ref Range Status   04/06/2019 33.1 (L) 34.5 - " 44.0 % Final      Platlets No results found for: LABPLAT   MCV MCV   Date Value Ref Range Status   04/06/2019 94.6 80.0 - 99.0 fL Final          Sodium Sodium   Date Value Ref Range Status   04/06/2019 137 132 - 146 mmol/L Final      Potassium Potassium   Date Value Ref Range Status   04/06/2019 4.2 3.5 - 5.5 mmol/L Final      Chloride Chloride   Date Value Ref Range Status   04/06/2019 104 99 - 109 mmol/L Final      CO2 CO2   Date Value Ref Range Status   04/06/2019 23.0 20.0 - 31.0 mmol/L Final      BUN BUN   Date Value Ref Range Status   04/06/2019 46 (H) 9 - 23 mg/dL Final      Creatinine Creatinine   Date Value Ref Range Status   04/06/2019 3.40 (H) 0.60 - 1.30 mg/dL Final      Calcium Calcium   Date Value Ref Range Status   04/06/2019 9.0 8.7 - 10.4 mg/dL Final      PO4 No results found for: CAPO4   Albumin No results found for: ALBUMIN   Magnesium No results found for: MG   Uric Acid No results found for: URICACID        Results Review:     I reviewed the patient's new clinical results.      amLODIPine 10 mg Oral Daily   amoxicillin-clavulanate 1 tablet Oral Q24H   atorvastatin 10 mg Oral Nightly   budesonide-formoterol 2 puff Inhalation BID - RT   calcitriol 0.25 mcg Oral Daily   carvedilol 25 mg Oral BID With Meals   cetirizine 10 mg Oral Daily   docusate sodium 100 mg Oral BID   doxycycline 100 mg Oral Q12H   epoetin porter 10,000 Units Subcutaneous Once per day on Mon Wed Fri   hydrALAZINE 100 mg Oral Q8H   insulin lispro 0-9 Units Subcutaneous 4x Daily With Meals & Nightly   isosorbide mononitrate 30 mg Oral Daily   melatonin 5 mg Oral Nightly   pantoprazole 40 mg Oral QAM   psyllium 1 packet Oral Daily   saccharomyces boulardii 250 mg Oral BID   sertraline 50 mg Oral Daily   sevelamer 800 mg Oral TID With Meals   simethicone 80 mg Oral Q6H   sodium chloride 3 mL Intravenous Q12H   warfarin 8 mg Oral Daily       heparin (porcine) 16 Units/kg/hr Last Rate: 16 Units/kg/hr (04/08/19 0631)   Pharmacy to Dose  Heparin     Pharmacy to dose warfarin         Medication Review:     Assessment/Plan       Volume overload    Essential hypertension    Morbid obesity (CMS/[facility])    Type 2 diabetes mellitus with chronic kidney disease on chronic dialysis, with long-term current use of insulin (CMS/[facility])    Anemia of chronic renal failure, stage 5 (CMS/[facility])    Chronic diastolic heart failure (CMS/[facility])    History of deep vein thrombosis (DVT) of brachial vein of left upper extremity (CMS/[facility])    Anticoagulated on Coumadin    URI (upper respiratory infection)    ESRD (end stage renal disease) (CMS/[facility])    Chest wall abscess    Subtherapeutic international normalized ratio (INR)      1- ESRD - MWF   2- HTn - controlled.   3- Anemia of chronic disease. At target  4- CKD MBD      Plan:  - Patient seen on dialysis. UF as tolerated.    - Renal diet.   - Epo with HD.      I discussed the patients findings and my recommendations with patient and nursing staff          Jackson Welch MD  19  9:12 AM        Electronically signed by Jackson Welch MD at 2019  9:13 AM     Krystin Byers MD at 2019  2:21 PM              [facility] Medicine Services  PROGRESS NOTE    Patient Name: Joy Bueno  : 1950  MRN: [id number]    Date of Admission: 2019  Length of Stay: 0  Primary Care Physician: Provider, No Known    Subjective   Subjective     CC:  Volume overload    HPI:  Denies SOA or complaints.  Looks well. Occasional resolving dry cough.    ROS:  Otherwise ROS is negative except as mentioned in the HPI.    Objective   Objective     Vital Signs:   Temp:  [98.2 °F (36.8 °C)-98.6 °F (37 °C)] 98.2 °F (36.8 °C)  Heart Rate:  [67-76] 71  Resp:  [16-18] 18  BP: (123-147)/(54-66) 140/62        Physical Exam:  Constitutional: No acute distress, awake, alert, nontoxic, obese body habitus  Respiratory: Clear to auscultation bilaterally, no crackles or rales, good effort, nonlabored respirations    Cardiovascular: RRR  Musculoskeletal: Trace peripheral edema, normal muscle tone for age  Psychiatric: Appropriate affect, good insight and judgement, cooperative  Skin: inframammary abscess s/p I&D with packing      Results Reviewed:  I have personally reviewed current lab, radiology, and data and agree.    Results from last 7 days   Lab Units 04/07/19  0538 04/06/19  0443 04/05/19  1553 04/05/19  0717   WBC 10*3/mm3  --  17.05*  --  11.66*   HEMOGLOBIN g/dL  --  9.9*  --  11.4*   HEMATOCRIT %  --  33.1*  --  37.7   PLATELETS 10*3/mm3  --  279  --  289   INR  1.74* 1.60* 1.46* 1.37*     Results from last 7 days   Lab Units 04/06/19 0443 04/05/19  0845 04/05/19  0717   SODIUM mmol/L 137  --  138   POTASSIUM mmol/L 4.2  --  5.1   CHLORIDE mmol/L 104  --  105   CO2 mmol/L 23.0  --  23.0   BUN mg/dL 46*  --  78*   CREATININE mg/dL 3.40*  --  4.49*   GLUCOSE mg/dL 233*  --  153*   CALCIUM mg/dL 9.0  --  9.1   ALT (SGPT) U/L  --   --  13   AST (SGOT) U/L  --   --  11   TROPONIN I ng/mL  --  0.00  --      Estimated Creatinine Clearance: 21.7 mL/min (A) (by C-G formula based on SCr of 3.4 mg/dL (H)).  BNP   Date Value Ref Range Status   04/05/2019 113.0 (H) 0.0 - 100.0 pg/mL Final     Comment:     Results may be falsely decreased if patient taking Biotin.       Microbiology Results Abnormal     Procedure Component Value - Date/Time    Wound Culture - Wound, Chest [[id number]]  (Abnormal)  (Susceptibility) Collected:  04/05/19 2105    Lab Status:  Preliminary result Specimen:  Wound from Chest Updated:  04/07/19 0950     Wound Culture Light growth (2+) Proteus mirabilis     Gram Stain Rare (1+) WBCs seen      Few (2+) Gram negative bacilli    Susceptibility      Proteus mirabilis     AL (Preliminary)     Ampicillin Susceptible     Ampicillin + Sulbactam Susceptible     Aztreonam Susceptible     Cefepime Susceptible     Cefotaxime Susceptible     Ceftriaxone Susceptible     Cefuroxime sodium Susceptible     Ertapenem  Susceptible     Gentamicin Susceptible     Levofloxacin Susceptible     Meropenem Susceptible     Piperacillin + Tazobactam Susceptible     Tobramycin Susceptible     Trimethoprim + Sulfamethoxazole Susceptible                    Influenza A & B, RT PCR - Swab, Nasopharynx [[id number]]  (Normal) Collected:  04/05/19 0718    Lab Status:  Final result Specimen:  Swab from Nasopharynx Updated:  04/05/19 0827     Influenza A PCR Not Detected     Influenza B PCR Not Detected          Imaging Results (last 24 hours)     ** No results found for the last 24 hours. **        Results for orders placed during the hospital encounter of 09/02/17   Adult Transthoracic Echo Complete    Narrative · Left ventricular wall thickness is consistent with mild concentric   hypertrophy.  · Left atrial cavity size is mildly dilated.  · Mild mitral valve regurgitation is present  · calcification of the aortic valve  · Mild tricuspid valve regurgitation is present.          I have reviewed the medications.    Current Facility-Administered Medications:   •  acetaminophen (TYLENOL) tablet 650 mg, 650 mg, Oral, Q4H PRN, Kelli Worrell MD, 650 mg at 04/06/19 2240  •  amLODIPine (NORVASC) tablet 10 mg, 10 mg, Oral, Daily, Kelli Worrell MD, 10 mg at 04/07/19 0924  •  amoxicillin-clavulanate (AUGMENTIN) 500-125 MG per tablet 500 mg, 1 tablet, Oral, Q24H, Krystin Byers MD, 500 mg at 04/06/19 1611  •  atorvastatin (LIPITOR) tablet 10 mg, 10 mg, Oral, Nightly, Kelli Worrell MD, 10 mg at 04/06/19 2104  •  budesonide-formoterol (SYMBICORT) 160-4.5 MCG/ACT inhaler 2 puff, 2 puff, Inhalation, BID - RT, Kelli Worrell MD, 2 puff at 04/07/19 0620  •  calcitriol (ROCALTROL) capsule 0.25 mcg, 0.25 mcg, Oral, Daily, Kelli Worrell MD, 0.25 mcg at 04/07/19 0924  •  carvedilol (COREG) tablet 25 mg, 25 mg, Oral, BID With Meals, Kelli Worrell MD, 25 mg at 04/07/19 0925  •  cetirizine (zyrTEC) tablet 10 mg, 10 mg, Oral, Daily, Kelli Worrell MD, 10 mg  at 04/07/19 0925  •  dextrose (D50W) 25 g/ 50mL Intravenous Solution 25 g, 25 g, Intravenous, Q15 Min PRN, Krystin Byers MD  •  dextrose (GLUTOSE) oral gel 15 g, 15 g, Oral, Q15 Min PRN, Krystin Byers MD  •  docusate sodium (COLACE) capsule 100 mg, 100 mg, Oral, BID, Kelli Worrell MD, 100 mg at 04/07/19 0925  •  doxycycline (MONODOX) capsule 100 mg, 100 mg, Oral, Q12H, Krystin Byers MD, 100 mg at 04/07/19 0925  •  glucagon (human recombinant) (GLUCAGEN DIAGNOSTIC) injection 1 mg, 1 mg, Subcutaneous, PRN, Krystin Byers MD  •  heparin 19726 units/250 mL (100 units/mL) in 0.45 % NaCl infusion, 14 Units/kg/hr, Intravenous, Titrated, David Welch, [facility], Last Rate: 18.48 mL/hr at 04/07/19 1309, 14 Units/kg/hr at 04/07/19 1309  •  hydrALAZINE (APRESOLINE) tablet 100 mg, 100 mg, Oral, Q8H, Kelli Worrell MD, 100 mg at 04/07/19 1309  •  insulin lispro (humaLOG) injection 0-9 Units, 0-9 Units, Subcutaneous, 4x Daily With Meals & Nightly, Krystin Byers MD, 6 Units at 04/07/19 1314  •  ipratropium-albuterol (DUO-NEB) nebulizer solution 3 mL, 3 mL, Nebulization, Q6H PRN, Kelli Worrell MD, 3 mL at 04/07/19 0620  •  isosorbide mononitrate (IMDUR) 24 hr tablet 30 mg, 30 mg, Oral, Daily, Kelli Worrell MD, 30 mg at 04/07/19 0925  •  melatonin tablet 5 mg, 5 mg, Oral, Nightly, Kelli Worrell MD, 5 mg at 04/06/19 2104  •  ondansetron (ZOFRAN) tablet 4 mg, 4 mg, Oral, Q6H PRN **OR** ondansetron (ZOFRAN) injection 4 mg, 4 mg, Intravenous, Q6H PRN, Kelli Worrell MD  •  pantoprazole (PROTONIX) EC tablet 40 mg, 40 mg, Oral, QAM, Kelli Worrell MD, 40 mg at 04/07/19 0601  •  Pharmacy to Dose Heparin, , Does not apply, Continuous PRN, Kelli Worrell MD  •  Pharmacy to dose warfarin, , Does not apply, Continuous PRN, Kelli Worrell MD  •  polyethylene glycol 3350 powder (packet), 17 g, Oral, Daily PRN, Kelli Worrell MD  •  psyllium (KONSYL) pack 1 packet, 1 packet, Oral, Daily, Kelli Worrell MD, 1 packet at  04/07/19 0924  •  saccharomyces boulardii (FLORASTOR) capsule 250 mg, 250 mg, Oral, BID, Kelli Worrell MD, 250 mg at 04/07/19 0925  •  sertraline (ZOLOFT) tablet 50 mg, 50 mg, Oral, Daily, Kelli Worrell MD, 50 mg at 04/07/19 0925  •  sevelamer (RENVELA) packet 0.8 g, 800 mg, Oral, TID With Meals, Kelli Worrell MD, 0.8 g at 04/07/19 1313  •  simethicone (MYLICON) chewable tablet 80 mg, 80 mg, Oral, Q6H, Kelli Worrell MD, 80 mg at 04/07/19 1309  •  [COMPLETED] Insert peripheral IV, , , Once **AND** sodium chloride 0.9 % flush 10 mL, 10 mL, Intravenous, PRN, Tarun Evans MD  •  sodium chloride 0.9 % flush 3 mL, 3 mL, Intravenous, Q12H, Kelli Worrell MD, 3 mL at 04/07/19 0926  •  sodium chloride 0.9 % flush 3-10 mL, 3-10 mL, Intravenous, PRN, Kelli Worrell MD  •  warfarin (COUMADIN) tablet 8 mg, 8 mg, Oral, Daily, David Welch, [facility], 8 mg at 04/06/19 1719    Assessment/Plan   Assessment / Plan     Active Hospital Problems    Diagnosis POA   • **Volume overload [E87.70] Yes   • URI (upper respiratory infection) [J06.9] Yes   • ESRD (end stage renal disease) (CMS/[facility]) [N18.6] Yes   • Chest wall abscess [L02.213] Yes   • Subtherapeutic international normalized ratio (INR) [R79.1] Yes   • History of deep vein thrombosis (DVT) of brachial vein of left upper extremity (CMS/[facility]) [I82.622] Yes     Dx February 2019     • Anticoagulated on Coumadin [Z51.81, Z79.01] Not Applicable   • Chronic diastolic heart failure (CMS/[facility]) [I50.32] Yes   • Anemia of chronic renal failure, stage 5 (CMS/[facility]) [N18.5, D63.1] Yes   • Type 2 diabetes mellitus with chronic kidney disease on chronic dialysis, with long-term current use of insulin (CMS/[facility]) [E11.22, N18.6, Z99.2, Z79.4] Not Applicable   • Essential hypertension [I10] Yes   • Morbid obesity (CMS/[facility]) [E66.01] Yes            Brief Hospital Course to date:  Joy Bueno is a 68 y.o. female with hx of HTN, chronic diastolic CHF, ESRD on HD (started October  2018), DM2, recurrent UTI's (ESBL Klebsiella), anemia of chronic disease, LUE brachial DVT (Feb 2019, on Coumadin), and prior C.diff infection presents to the ER due to URI symptoms for the past 3 weeks.  Due to not feeling well, she missed dialysis on Wednesday and missed it again Friday, presenting to [facility] ED when she became progressively SOA. In the ER, CXR was unremarkable, labs showed mild leukocytosis and subtherapeutic INR 1.37. Nephrology was called and recommended admission for dialysis since she will be unable to get it again until Monday.      Volume overload due to missed HD x2  - s/p HD x 2  - now return to normal M/W/F schedule    Subtherapeutic INR  Recent DVT dx in Feb 2019  - hep gtt bridging -->  Planning for d/c Monday after HD even if INR not quite 2 as it seems to be reliably increasing and Pharmacy can advise d/c dose and SNF can recheck INR in ~2 days  - Pharm to dose    Inframammary chest wall cutaneous abscess  - s/p I&D in ED with packing in place.  Will ask nursing to re-pack today -->  Will need to send instructions for daily packing until healed to SNF at d/c  - PO Augmentin and Doxy x 10 days    URI, likely viral   - supportive care  - if atypical will be covered by Doxy course    DVT Prophylaxis:  Hep gtt, coumadin bridging    Disposition: I expect the patient to be discharged via EMS back to SNF Monday after HD. In interim, will work to get INR (nearer) therapeutic and perform packing to abscess.      CODE STATUS:   Code Status and Medical Interventions:   Ordered at: 04/05/19 1154     Code Status:    CPR     Medical Interventions (Level of Support Prior to Arrest):    Full           Electronically signed by Krystin Byers MD, 04/07/19, 2:21 PM.        Electronically signed by Krystin Byers MD at 4/7/2019  2:25 PM     Jackson Welch MD at 4/7/2019  9:33 AM             LOS: 0 days    Patient Care Team:  Provider, No Known as PCP - General        Subjective     Interval History:  "    No acute events overnight. No new complaints.     Review of Systems:   No CP or SOA    Objective     Vital Sign Min/Max for last 24 hours  Temp  Min: 97.4 °F (36.3 °C)  Max: 98.6 °F (37 °C)   BP  Min: 123/54  Max: 170/100   Pulse  Min: 66  Max: 86   Resp  Min: 16  Max: 20   SpO2  Min: 94 %  Max: 100 %   No Data Recorded   No Data Recorded     Flowsheet Rows      First Filed Value   Admission Height  162.6 cm (64\") Documented at 04/05/2019 0624   Admission Weight  132 kg (290 lb) Documented at 04/05/2019 0624          I/O this shift:  In: 360 [P.O.:360]  Out: -   I/O last 3 completed shifts:  In: 1630 [P.O.:1320; I.V.:310]  Out: 1120 [Urine:740; Other:380]    Physical Exam:     General Appearance:    Alert, cooperative, in no acute distress   Head:    Normocephalic, without obvious abnormality, atraumatic               Neck:   No adenopathy, supple, trachea midline, no thyromegaly, no     carotid bruit, no JVD       Lungs:     Clear to auscultation,respirations regular, even and                   unlabored    Heart:    Regular rhythm and normal rate, normal S1 and S2, no            murmur, no gallop, no rub, no click   Breast Exam:    Deferred   Abdomen:     Normal bowel sounds, no masses, no organomegaly, soft        non-tender, non-distended, no guarding, no rebound                 tenderness   Genitalia:    Deferred   Extremities:   Moves all extremities well, no edema, no cyanosis, no              redness               Neurologic:   No focal deficit noted.        WBC WBC   Date Value Ref Range Status   04/06/2019 17.05 (H) 3.50 - 10.80 10*3/mm3 Final   04/05/2019 11.66 (H) 3.50 - 10.80 10*3/mm3 Final      HGB Hemoglobin   Date Value Ref Range Status   04/06/2019 9.9 (L) 11.5 - 15.5 g/dL Final   04/05/2019 11.4 (L) 11.5 - 15.5 g/dL Final      HCT Hematocrit   Date Value Ref Range Status   04/06/2019 33.1 (L) 34.5 - 44.0 % Final   04/05/2019 37.7 34.5 - 44.0 % Final      Platlets No results found for: LABPLAT "   MCV MCV   Date Value Ref Range Status   04/06/2019 94.6 80.0 - 99.0 fL Final   04/05/2019 95.0 80.0 - 99.0 fL Final          Sodium Sodium   Date Value Ref Range Status   04/06/2019 137 132 - 146 mmol/L Final   04/05/2019 138 132 - 146 mmol/L Final      Potassium Potassium   Date Value Ref Range Status   04/06/2019 4.2 3.5 - 5.5 mmol/L Final   04/05/2019 5.1 3.5 - 5.5 mmol/L Final      Chloride Chloride   Date Value Ref Range Status   04/06/2019 104 99 - 109 mmol/L Final   04/05/2019 105 99 - 109 mmol/L Final      CO2 CO2   Date Value Ref Range Status   04/06/2019 23.0 20.0 - 31.0 mmol/L Final   04/05/2019 23.0 20.0 - 31.0 mmol/L Final      BUN BUN   Date Value Ref Range Status   04/06/2019 46 (H) 9 - 23 mg/dL Final   04/05/2019 78 (H) 9 - 23 mg/dL Final      Creatinine Creatinine   Date Value Ref Range Status   04/06/2019 3.40 (H) 0.60 - 1.30 mg/dL Final   04/05/2019 4.49 (H) 0.60 - 1.30 mg/dL Final      Calcium Calcium   Date Value Ref Range Status   04/06/2019 9.0 8.7 - 10.4 mg/dL Final   04/05/2019 9.1 8.7 - 10.4 mg/dL Final      PO4 No results found for: CAPO4   Albumin Albumin   Date Value Ref Range Status   04/05/2019 3.54 3.20 - 4.80 g/dL Final      Magnesium No results found for: MG   Uric Acid No results found for: URICACID        Results Review:     I reviewed the patient's new clinical results.      amLODIPine 10 mg Oral Daily   amoxicillin-clavulanate 1 tablet Oral Q24H   atorvastatin 10 mg Oral Nightly   budesonide-formoterol 2 puff Inhalation BID - RT   calcitriol 0.25 mcg Oral Daily   carvedilol 25 mg Oral BID With Meals   cetirizine 10 mg Oral Daily   docusate sodium 100 mg Oral BID   doxycycline 100 mg Oral Q12H   hydrALAZINE 100 mg Oral Q8H   insulin lispro 0-9 Units Subcutaneous 4x Daily With Meals & Nightly   isosorbide mononitrate 30 mg Oral Daily   melatonin 5 mg Oral Nightly   pantoprazole 40 mg Oral QAM   psyllium 1 packet Oral Daily   saccharomyces boulardii 250 mg Oral BID   sertraline  50 mg Oral Daily   sevelamer 800 mg Oral TID With Meals   simethicone 80 mg Oral Q6H   sodium chloride 3 mL Intravenous Q12H   warfarin 8 mg Oral Daily       heparin (porcine) 14 Units/kg/hr Last Rate: 14 Units/kg/hr (04/07/19 0927)   Pharmacy to Dose Heparin     Pharmacy to dose warfarin         Medication Review:     Assessment/Plan       Volume overload    Essential hypertension    Morbid obesity (CMS/[facility])    Type 2 diabetes mellitus with chronic kidney disease on chronic dialysis, with long-term current use of insulin (CMS/[facility])    Anemia of chronic renal failure, stage 5 (CMS/[facility])    Chronic diastolic heart failure (CMS/[facility])    History of deep vein thrombosis (DVT) of brachial vein of left upper extremity (CMS/[facility])    Anticoagulated on Coumadin    URI (upper respiratory infection)    ESRD (end stage renal disease) (CMS/[facility])    Chest wall abscess    Subtherapeutic international normalized ratio (INR)      1- ESRD - MWF   2- HTn - controlled.   3- Anemia of chronic disease. At target  4- CKD MBD      Plan:  - HD per schedule.   - UF as tolerated.   - Renal diet.   - Epo with HD.      I discussed the patients findings and my recommendations with patient and nursing staff          Jackson Welch MD  04/07/19  9:33 AM        Electronically signed by Jackson Welch MD at 4/7/2019 12:31 PM          Consult Notes (most recent note)      Jackson Welch MD at 4/5/2019 11:09 AM            Referring Provider: [place]   Reason for Consultation: ESRD     Subjective     Chief complaint Cough and shortness of breath     History of present illness:  This is 68 year old female with PMH of ESRD on dialysis MWF schedule who presented to ED for cough and shortness of breath. She has been feeling sick and has missed 2 session of dialysis. Nephrology service has been consulted for ESRD management.      History  Past Medical History:   Diagnosis Date   • Acute on chronic diastolic heart failure (CMS/HCC)     • Acute on chronic heart failure (CMS/[facility])    • Anemia    • Anxiety    • Asthma    • Chronic diastolic heart failure (CMS/[facility])    • Chronic kidney disease    • Diabetes mellitus (CMS/[facility])    • Diabetes mellitus, type II (CMS/[facility]) 8/3/2018   • History of Clostridium difficile infection 8/3/2018   • History of respiratory failure, since off home oxygen 8/3/2018   • History of UTI 8/3/2018   • Hypertension    • Morbid obesity (CMS/[facility])    • Osteoarthritis    • Tinea capitis 8/3/2018   ,   Past Surgical History:   Procedure Laterality Date   • ARTERIOVENOUS FISTULA/SHUNT SURGERY Left 10/22/2018    Procedure: LEFT UPPER EXTREMITY ARTERIOVENOUS FISTULA CREATION;  Surgeon: Carlos Enrique Calderón MD;  Location:  [facility];  Service: Vascular   •  SECTION     • COLONOSCOPY N/A 2017    Procedure: COLONOSCOPY;  Surgeon: Mark I Brunner, MD;  Location:  [facility];  Service:    • ENDOSCOPY N/A 2017    Procedure: ESOPHAGOGASTRODUODENOSCOPY ;  Surgeon: Velasquez Call MD;  Location:  [facility];  Service:    • HERNIA REPAIR     • INSERTION HEMODIALYSIS CATHETER Right 10/17/2018    Procedure: HEMODIALYSIS CATHETER INSERTION;  Surgeon: Carlos Enrique Calderón MD;  Location:  [facility];  Service: General   ,   Family History   Problem Relation Age of Onset   • Cardiomyopathy Mother    • Stroke Father    • Diabetes Father    ,   Social History     Tobacco Use   • Smoking status: Former Smoker     Packs/day: 0.25   • Tobacco comment: unknown when quit, maybe last year   Substance Use Topics   • Alcohol use: No   • Drug use: No   ,   (Not in a hospital admission), Scheduled Meds:    warfarin 7.5 mg Oral Daily   , Continuous Infusions:    heparin (porcine) 11.3 Units/kg/hr   Pharmacy to Dose Heparin    Pharmacy to dose warfarin    , PRN Meds:  Pharmacy to Dose Heparin  •  Pharmacy to dose warfarin  •  [COMPLETED] Insert peripheral IV **AND** sodium chloride and Allergies:  Geodon [ziprasidone hcl]; Haldol  [haloperidol lactate]; and Seroquel [quetiapine fumarate]    Review of Systems  Pertinent items are noted in HPI    Objective     Vital Signs  Temp:  [98.1 °F (36.7 °C)] 98.1 °F (36.7 °C)  Heart Rate:  [67-74] 71  Resp:  [20] 20  BP: (126-140)/(61-74) 136/68    No intake/output data recorded.  No intake/output data recorded.    Physical Exam:     General Appearance:    Alert, cooperative, in no acute distress   Head:    Normocephalic, without obvious abnormality, atraumatic   Eyes:            Lids and lashes normal, conjunctivae and sclerae normal, no   icterus, no pallor, corneas clear, PERRLA           Neck:   No adenopathy, supple, trachea midline, no thyromegaly, no     carotid bruit, no JVD       Lungs:     Clear to auscultation,respirations regular, even and                   unlabored    Heart:    Regular rhythm and normal rate, normal S1 and S2, no            murmur, no gallop, no rub, no click       Abdomen:     Normal bowel sounds, no masses, no organomegaly, soft        non-tender, non-distended, no guarding, no rebound                 tenderness       Extremities:   Moves all extremities well, no edema, no cyanosis, no              redness   Pulses:   Pulses palpable and equal bilaterally   Skin:   No bleeding, bruising or rash   Lymph nodes:   No palpable adenopathy   Neurologic:   Cranial nerves 2 - 12 grossly intact, sensation intact, DTR        present and equal bilaterally       Results Review:   I reviewed the patient's new clinical results.    WBC WBC   Date Value Ref Range Status   04/05/2019 11.66 (H) 3.50 - 10.80 10*3/mm3 Final      HGB Hemoglobin   Date Value Ref Range Status   04/05/2019 11.4 (L) 11.5 - 15.5 g/dL Final      HCT Hematocrit   Date Value Ref Range Status   04/05/2019 37.7 34.5 - 44.0 % Final      Platlets No results found for: LABPLAT   MCV MCV   Date Value Ref Range Status   04/05/2019 95.0 80.0 - 99.0 fL Final          Sodium Sodium   Date Value Ref Range Status   04/05/2019  138 132 - 146 mmol/L Final      Potassium Potassium   Date Value Ref Range Status   04/05/2019 5.1 3.5 - 5.5 mmol/L Final      Chloride Chloride   Date Value Ref Range Status   04/05/2019 105 99 - 109 mmol/L Final      CO2 CO2   Date Value Ref Range Status   04/05/2019 23.0 20.0 - 31.0 mmol/L Final      BUN BUN   Date Value Ref Range Status   04/05/2019 78 (H) 9 - 23 mg/dL Final      Creatinine Creatinine   Date Value Ref Range Status   04/05/2019 4.49 (H) 0.60 - 1.30 mg/dL Final      Calcium Calcium   Date Value Ref Range Status   04/05/2019 9.1 8.7 - 10.4 mg/dL Final      PO4 No results found for: CAPO4   Albumin Albumin   Date Value Ref Range Status   04/05/2019 3.54 3.20 - 4.80 g/dL Final      Magnesium No results found for: MG   Uric Acid No results found for: URICACID           warfarin 7.5 mg Oral Daily       heparin (porcine) 11.3 Units/kg/hr   Pharmacy to Dose Heparin    Pharmacy to dose warfarin        Assessment/Plan       URI (upper respiratory infection)      1- ESRD - MWF   2- HTn - controlled.   3- Anemia of chronic disease. At target  4- CKD MBD     Plan:  - HD today. UF as tolerated.   - Renal diet   - Continue with vitamin D, calcitriol and renvela.     I discussed the patients findings and my recommendations with patient and nursing staff    Jackson Welch MD  04/05/19  @NOW            Electronically signed by Jackson Welch MD at 4/5/2019 12:18 PM

## 2019-04-08 NOTE — PROGRESS NOTES
Case Management Discharge Note    Final Note: Pt back from Dialysis. She states she has been at [facility] doing rehab. She plans to return there to finish rehab and may transition to long term care. Called Natali at [facility] at [phone] and pt still has her same chair time for Wednesday. CM will fax d/c summary to [facility] at [phone]. Nurse to call report to [facility] at [phone] and fax summary to [phone]. Spoke to Keshawn at [facility] and pt is ok to return today if medically ready. Faxed results of chest wall cultures to her. Ambulance with [facility] was scheduled for today at 1500 with PCS in chart. CM will cont to follow.     Destination - Selection Complete      Service Provider Request Status Selected Services Address Phone Number Fax Number    [facility] Selected Skilled Nursing [address] [place] [address] [place] [id number] [phone]      Durable Medical Equipment      No service has been selected for the patient.      Dialysis/Infusion      No service has been selected for the patient.      Home Medical Care      No service has been selected for the patient.      Therapy      No service has been selected for the patient.      Community Resources      No service has been selected for the patient.             Final Discharge Disposition Code: 03 - skilled nursing facility (SNF)

## 2019-04-08 NOTE — PROGRESS NOTES
"   LOS: 0 days    Patient Care Team:  Provider, No Known as PCP - General        Subjective     Interval History:     No acute events overnight. No new complaints.     Review of Systems:   No CP or SOA    Objective     Vital Sign Min/Max for last 24 hours  Temp  Min: 97 °F (36.1 °C)  Max: 98.2 °F (36.8 °C)   BP  Min: 123/61  Max: 163/80   Pulse  Min: 64  Max: 73   Resp  Min: 14  Max: 20   SpO2  Min: 97 %  Max: 98 %   No Data Recorded   Weight  Min: 136 kg (300 lb 9.6 oz)  Max: 136 kg (300 lb 9.6 oz)     Flowsheet Rows      First Filed Value   Admission Height  162.6 cm (64\") Documented at 04/05/2019 0624   Admission Weight  132 kg (290 lb) Documented at 04/05/2019 0624          No intake/output data recorded.  I/O last 3 completed shifts:  In: 1080 [P.O.:1080]  Out: 400 [Urine:400]    Physical Exam:     General Appearance:    Alert, cooperative, in no acute distress   Head:    Normocephalic, without obvious abnormality, atraumatic               Neck:   No adenopathy, supple, trachea midline, no thyromegaly, no     carotid bruit, no JVD       Lungs:     Clear to auscultation,respirations regular, even and                   unlabored    Heart:    Regular rhythm and normal rate, normal S1 and S2, no            murmur, no gallop, no rub, no click   Breast Exam:    Deferred   Abdomen:     Normal bowel sounds, no masses, no organomegaly, soft        non-tender, non-distended, no guarding, no rebound                 tenderness   Genitalia:    Deferred   Extremities:   Moves all extremities well, no edema, no cyanosis, no              redness               Neurologic:   No focal deficit noted.        WBC WBC   Date Value Ref Range Status   04/06/2019 17.05 (H) 3.50 - 10.80 10*3/mm3 Final      HGB Hemoglobin   Date Value Ref Range Status   04/06/2019 9.9 (L) 11.5 - 15.5 g/dL Final      HCT Hematocrit   Date Value Ref Range Status   04/06/2019 33.1 (L) 34.5 - 44.0 % Final      Platlets No results found for: LABPLAT   MCV MCV "   Date Value Ref Range Status   04/06/2019 94.6 80.0 - 99.0 fL Final          Sodium Sodium   Date Value Ref Range Status   04/06/2019 137 132 - 146 mmol/L Final      Potassium Potassium   Date Value Ref Range Status   04/06/2019 4.2 3.5 - 5.5 mmol/L Final      Chloride Chloride   Date Value Ref Range Status   04/06/2019 104 99 - 109 mmol/L Final      CO2 CO2   Date Value Ref Range Status   04/06/2019 23.0 20.0 - 31.0 mmol/L Final      BUN BUN   Date Value Ref Range Status   04/06/2019 46 (H) 9 - 23 mg/dL Final      Creatinine Creatinine   Date Value Ref Range Status   04/06/2019 3.40 (H) 0.60 - 1.30 mg/dL Final      Calcium Calcium   Date Value Ref Range Status   04/06/2019 9.0 8.7 - 10.4 mg/dL Final      PO4 No results found for: CAPO4   Albumin No results found for: ALBUMIN   Magnesium No results found for: MG   Uric Acid No results found for: URICACID        Results Review:     I reviewed the patient's new clinical results.      amLODIPine 10 mg Oral Daily   amoxicillin-clavulanate 1 tablet Oral Q24H   atorvastatin 10 mg Oral Nightly   budesonide-formoterol 2 puff Inhalation BID - RT   calcitriol 0.25 mcg Oral Daily   carvedilol 25 mg Oral BID With Meals   cetirizine 10 mg Oral Daily   docusate sodium 100 mg Oral BID   doxycycline 100 mg Oral Q12H   epoetin porter 10,000 Units Subcutaneous Once per day on Mon Wed Fri   hydrALAZINE 100 mg Oral Q8H   insulin lispro 0-9 Units Subcutaneous 4x Daily With Meals & Nightly   isosorbide mononitrate 30 mg Oral Daily   melatonin 5 mg Oral Nightly   pantoprazole 40 mg Oral QAM   psyllium 1 packet Oral Daily   saccharomyces boulardii 250 mg Oral BID   sertraline 50 mg Oral Daily   sevelamer 800 mg Oral TID With Meals   simethicone 80 mg Oral Q6H   sodium chloride 3 mL Intravenous Q12H   warfarin 8 mg Oral Daily       heparin (porcine) 16 Units/kg/hr Last Rate: 16 Units/kg/hr (04/08/19 0631)   Pharmacy to Dose Heparin     Pharmacy to dose warfarin         Medication Review:      Assessment/Plan       Volume overload    Essential hypertension    Morbid obesity (CMS/HCC)    Type 2 diabetes mellitus with chronic kidney disease on chronic dialysis, with long-term current use of insulin (CMS/HCC)    Anemia of chronic renal failure, stage 5 (CMS/HCC)    Chronic diastolic heart failure (CMS/HCC)    History of deep vein thrombosis (DVT) of brachial vein of left upper extremity (CMS/HCC)    Anticoagulated on Coumadin    URI (upper respiratory infection)    ESRD (end stage renal disease) (CMS/HCC)    Chest wall abscess    Subtherapeutic international normalized ratio (INR)      1- ESRD - MWF   2- HTn - controlled.   3- Anemia of chronic disease. At target  4- CKD MBD      Plan:  - Patient seen on dialysis. UF as tolerated.    - Renal diet.   - Epo with HD.      I discussed the patients findings and my recommendations with patient and nursing staff          Jackson Welch MD  04/08/19  9:12 AM

## 2019-04-08 NOTE — DISCHARGE SUMMARY
[facility] Medicine Services  DISCHARGE SUMMARY    Patient Name: Joy Bueno  : 1950  MRN: [id number]    Date of Admission: 2019  Date of Discharge:  2019  Primary Care Physician: Provider, No Known    Consults     No orders found from 3/7/2019 to 2019.          Hospital Course     Presenting Problem:   URI (upper respiratory infection) [J06.9]    Active Hospital Problems    Diagnosis  POA   • **Volume overload [E87.70]  Yes   • URI (upper respiratory infection) [J06.9]  Yes   • ESRD (end stage renal disease) (CMS/[facility]) [N18.6]  Yes   • Chest wall abscess [L02.213]  Yes   • Subtherapeutic international normalized ratio (INR) [R79.1]  Yes   • History of deep vein thrombosis (DVT) of brachial vein of left upper extremity (CMS/[facility]) [I82.622]  Yes   • Anticoagulated on Coumadin [Z51.81, Z79.01]  Not Applicable   • Chronic diastolic heart failure (CMS/[facility]) [I50.32]  Yes   • Anemia of chronic renal failure, stage 5 (CMS/[facility]) [N18.5, D63.1]  Yes   • Type 2 diabetes mellitus with chronic kidney disease on chronic dialysis, with long-term current use of insulin (CMS/[facility]) [E11.22, N18.6, Z99.2, Z79.4]  Not Applicable   • Essential hypertension [I10]  Yes   • Morbid obesity (CMS/[facility]) [E66.01]  Yes      Resolved Hospital Problems   No resolved problems to display.          Hospital Course:  Joy Bueno is a 68 y.o. female with hx of HTN, chronic diastolic CHF, ESRD on HD (started 2018), DM2, recurrent UTI's (ESBL Klebsiella), anemia of chronic disease, LUE brachial DVT (2019, on Coumadin), and prior C.diff infection presents to the ER due to URI symptoms for the past 3 weeks.  Due to not feeling well, she missed dialysis x2. She presented to [facility] ED when she became progressively SOA. In the ER, CXR was unremarkable, labs showed mild leukocytosis and subtherapeutic INR 1.37. Nephrology was called and recommended admission for dialysis since she was unable to get it  outpatient until Monday. Patient was bridged back to therapeutic INR with heparin gtt and pharmacy has been managing. In the ED she was also noted to have a small chest wall abscess. ER provider performed I&D, patient was started on both Doxy and Augmentin, cultures grew proteus and enterococcus. She should complete 10 days of antibiotics, the wound will need to be packed daily until healed. She has mild constipation, but has been given both oral laxatives and suppository prior to discharge.    Discharge Follow Up Recommendations for labs/diagnostics:  - follow up with PCP or facility provider in 1 week  - continue coumadin 8mg daily, goal INR 2-3, please follow at SNF  - continue dialysis as already scheduled M/W/F  - daily wound packing to chest wall abscess until healed    Day of Discharge     HPI:   Patient resting in bed. No complaints other than feeling very constipated, has not had a good BM in 3 days. Patient denies chest pain, no shortness of breath, no fever/chills    Review of Systems  Gen- No fevers, chills  CV- No chest pain, palpitations  Resp- No cough, dyspnea  GI- No N/V/D, abd pain      Otherwise ROS is negative except as mentioned in the HPI.    Vital Signs:   Temp:  [96.4 °F (35.8 °C)-98.2 °F (36.8 °C)] 96.4 °F (35.8 °C)  Heart Rate:  [64-73] 66  Resp:  [14-20] 15  BP: (104-163)/(53-80) 117/62     Physical Exam:  Constitutional: No acute distress, awake, alert, morbidly obese  HENT: NCAT, mucous membranes moist  Respiratory: Clear to auscultation bilaterally, respiratory effort normal   Cardiovascular: RRR, no murmurs, rubs, or gallops, palpable pedal pulses bilaterally  Gastrointestinal: Positive bowel sounds, soft, nontender, nondistended  Musculoskeletal: No bilateral ankle edema  Psychiatric: Appropriate affect, cooperative  Neurologic: Oriented x 3, strength symmetric in all extremities, Cranial Nerves grossly intact to confrontation, speech clear  Skin: small abscess near left breast s/p  I&D with packing in place      Pertinent  and/or Most Recent Results     Results from last 7 days   Lab Units 04/06/19  0443 04/05/19  0717   WBC 10*3/mm3 17.05* 11.66*   HEMOGLOBIN g/dL 9.9* 11.4*   HEMATOCRIT % 33.1* 37.7   PLATELETS 10*3/mm3 279 289   SODIUM mmol/L 137 138   POTASSIUM mmol/L 4.2 5.1   CHLORIDE mmol/L 104 105   CO2 mmol/L 23.0 23.0   BUN mg/dL 46* 78*   CREATININE mg/dL 3.40* 4.49*   GLUCOSE mg/dL 233* 153*   CALCIUM mg/dL 9.0 9.1     Results from last 7 days   Lab Units 04/08/19  0533 04/07/19  0538 04/06/19  0443 04/05/19  1553 04/05/19  0717   BILIRUBIN mg/dL  --   --   --   --  0.3   ALK PHOS U/L  --   --   --   --  89   ALT (SGPT) U/L  --   --   --   --  13   AST (SGOT) U/L  --   --   --   --  11   PROTIME Seconds 21.5* 19.6* 18.3* 17.1* 16.3*   INR  1.97* 1.74* 1.60* 1.46* 1.37*   APTT seconds  --   --   --   --  39.4*           Invalid input(s): TG, LDLCALC, LDLREALC  Results from last 7 days   Lab Units 04/05/19  0845 04/05/19  0717   BNP pg/mL  --  113.0*   TROPONIN I ng/mL 0.00  --        Brief Urine Lab Results  (Last result in the past 365 days)      Color   Clarity   Blood   Leuk Est   Nitrite   Protein   CREAT   Urine HCG        02/11/19 1417 Yellow Cloudy Trace Large (3+) Negative 100 mg/dL (2+)               Microbiology Results Abnormal     Procedure Component Value - Date/Time    Wound Culture - Wound, Chest [[id number]]  (Abnormal)  (Susceptibility) Collected:  04/05/19 2105    Lab Status:  Preliminary result Specimen:  Wound from Chest Updated:  04/08/19 1016     Wound Culture Light growth (2+) Proteus mirabilis      Light growth (2+) Enterococcus species     Gram Stain Rare (1+) WBCs seen      Few (2+) Gram negative bacilli    Susceptibility      Proteus mirabilis     AL (Preliminary)     Ampicillin Susceptible     Ampicillin + Sulbactam Susceptible     Aztreonam Susceptible     Cefepime Susceptible     Cefotaxime Susceptible     Ceftriaxone Susceptible     Cefuroxime sodium  Susceptible     Ertapenem Susceptible     Gentamicin Susceptible     Levofloxacin Susceptible     Meropenem Susceptible     Piperacillin + Tazobactam Susceptible     Tobramycin Susceptible     Trimethoprim + Sulfamethoxazole Susceptible                    Influenza A & B, RT PCR - Swab, Nasopharynx [[id number]]  (Normal) Collected:  04/05/19 0718    Lab Status:  Final result Specimen:  Swab from Nasopharynx Updated:  04/05/19 0827     Influenza A PCR Not Detected     Influenza B PCR Not Detected          Imaging Results (all)     Procedure Component Value Units Date/Time    XR Chest 1 View [[id number]] Collected:  04/05/19 0830     Updated:  04/05/19 1149    Narrative:       EXAMINATION: XR CHEST 1 VW-04/05/2019:      INDICATION: SOB.     TECHNIQUE:  Single view frontal chest.     COMPARISONS: 02/13/2019.     FINDINGS:  Unchanged tunneled right IJ dialysis catheter. Cardiomegaly;  double density overlying the cardiac silhouette probably indicates left  atrial enlargement. The left hemidiaphragm silhouette is obscured,  probably due to atelectasis. No sizable pleural effusion. No  pneumothorax.       Impression:       Cardiomegaly and left basilar atelectasis.     D:  04/05/2019  E:  04/05/2019     This report was finalized on 4/5/2019 11:45 AM by Marcus Garcia.             Results for orders placed during the hospital encounter of 02/11/19   Duplex Venous Upper Extremity - Left CAR    Narrative · There is evidence of deep venous thrombosis in the mid and distal left   brachial vein  · There is evidence of superficial venous thrombosis involving the left   basilic vein in the forearm.          Results for orders placed during the hospital encounter of 02/11/19   Duplex Venous Upper Extremity - Left CAR    Narrative · There is evidence of deep venous thrombosis in the mid and distal left   brachial vein  · There is evidence of superficial venous thrombosis involving the left   basilic vein in the forearm.          Results  for orders placed during the hospital encounter of 09/02/17   Adult Transthoracic Echo Complete    Narrative · Left ventricular wall thickness is consistent with mild concentric   hypertrophy.  · Left atrial cavity size is mildly dilated.  · Mild mitral valve regurgitation is present  · calcification of the aortic valve  · Mild tricuspid valve regurgitation is present.           Order Current Status    Wound Culture - Wound, Chest Preliminary result        Discharge Details        Discharge Medications      New Medications      Instructions Start Date   amoxicillin-clavulanate 500-125 MG per tablet  Commonly known as:  AUGMENTIN   1 tablet, Oral, Every 24 Hours Scheduled      doxycycline 100 MG capsule  Commonly known as:  MONODOX   100 mg, Oral, Every 12 Hours Scheduled         Continue These Medications      Instructions Start Date   acetaminophen 325 MG tablet  Commonly known as:  TYLENOL   650 mg, Oral, Every 6 Hours PRN      albuterol sulfate  (90 Base) MCG/ACT inhaler  Commonly known as:  PROVENTIL HFA;VENTOLIN HFA;PROAIR HFA   2 puffs, Inhalation, Every 4 Hours PRN      amLODIPine 10 MG tablet  Commonly known as:  NORVASC   10 mg, Oral, Daily      atorvastatin 10 MG tablet  Commonly known as:  LIPITOR   10 mg, Oral, Nightly      calcitriol 0.25 MCG capsule  Commonly known as:  ROCALTROL   0.25 mcg, Oral, Daily      carvedilol 25 MG tablet  Commonly known as:  COREG   25 mg, Oral, 2 Times Daily With Meals      cetirizine 10 MG tablet  Commonly known as:  zyrTEC   10 mg, Oral, Daily      dextrose 40 % gel  Commonly known as:  GLUTOSE   15 g, Oral, Every 1 Hour PRN      docusate sodium 100 MG capsule  Commonly known as:  COLACE   100 mg, Oral, 2 Times Daily      fluticasone-salmeterol 500-50 MCG/DOSE DISKUS  Commonly known as:  ADVAIR   1 puff, Inhalation, 2 Times Daily - RT      glucagon 1 MG injection  Commonly known as:  GLUCAGEN   1 mg, Intravenous, As Needed      hydrALAZINE 100 MG tablet  Commonly  known as:  APRESOLINE   100 mg, Oral, Every 8 Hours      ipratropium-albuterol 0.5-2.5 mg/3 ml nebulizer  Commonly known as:  DUO-NEB   3 mL, Nebulization, Every 6 Hours PRN      isosorbide mononitrate 30 MG 24 hr tablet  Commonly known as:  IMDUR   30 mg, Oral, Daily      melatonin 3 MG tablet   3 mg, Oral, Nightly      ondansetron 4 MG tablet  Commonly known as:  ZOFRAN   4 mg, Oral, Every 8 Hours PRN      pantoprazole 40 MG EC tablet  Commonly known as:  PROTONIX   40 mg, Oral, Daily      polyethylene glycol packet  Commonly known as:  MIRALAX   17 g, Oral, Daily PRN      psyllium 60.3 % pack pack  Commonly known as:  KONSYL   1 packet, Oral, Daily      saccharomyces boulardii 250 MG capsule  Commonly known as:  FLORASTOR   250 mg, Oral, 2 Times Daily      sennosides-docusate sodium 8.6-50 MG tablet  Commonly known as:  SENOKOT-S   1 tablet, Oral, 2 Times Daily PRN      sertraline 50 MG tablet  Commonly known as:  ZOLOFT   50 mg, Oral, Daily      sevelamer 800 MG tablet  Commonly known as:  RENVELA   800 mg, Oral, 3 Times Daily With Meals      simethicone 80 MG chewable tablet  Commonly known as:  MYLICON   80 mg, Oral, Every 6 Hours      TAB-A-STAR tablet   1 tablet, Oral, Daily      Vitamin D3 60103 units tablet   50,000 Units, Oral, Every 7 Days, wednesday      warfarin 4 MG tablet  Commonly known as:  COUMADIN   8 mg, Oral, Daily Warfarin, 2 tabs (8mg) daily          Stop These Medications    clindamycin 300 MG capsule  Commonly known as:  CLEOCIN            Allergies   Allergen Reactions   • Geodon [Ziprasidone Hcl] Unknown (See Comments)     PT DOESN'T REMEMBER   • Haldol [Haloperidol Lactate] Unknown (See Comments)     PT DOESN'T REMEMBER   • Seroquel [Quetiapine Fumarate] Unknown (See Comments)     PT DOESN'T REMEMBER         Discharge Disposition:  Skilled Nursing Facility (DC - External)    Discharge Diet:  Diet Order   Procedures   • Diet Regular; Cardiac, Consistent Carbohydrate, Renal          Discharge Activity:   - as tolerated      CODE STATUS:    Code Status and Medical Interventions:   Ordered at: 04/05/19 1154     Code Status:    CPR     Medical Interventions (Level of Support Prior to Arrest):    Full         No future appointments.        Time Spent on Discharge: 40 minutes    Electronically signed by Nanda Gonzalez DO, 04/08/19, 1:23 PM.

## 2019-04-08 NOTE — PROGRESS NOTES
HEPARIN INFUSION  Joy Bueno is a  68 y.o. female receiving heparin infusion.     Therapy for VTE  Patient Weight: 132kg  Initial Bolus (Y/N):   Y  Any Bolus (Y/N):   Y        Signs or Symptoms of Bleeding: N  VTE (PE/DVT)   Initial Bolus: 80 units/kg (Max 10,000 units)  Initial rate: 18 units/kg/hr (Max 1,500 units/hr)      (Anti-Xa) (IU/mL) Bolus Dose Stop Infusion Rate Change Repeat (Anti-Xa)     ? 0.19 80 units/kg 0 hrs Increase rate by   4 units/kg/hr 6 hrs      0.2 - 0.29 40 units/kg 0 hrs Increase rate by 2 units/kg/hr 6 hrs    0.3 - 0.7  0 0 hrs No change 6 hrs      0.71 - 0.99   0  0 hrs Decrease rate by 2 units/kg/hr 6 hrs      ? 1 0 Hold 1 hr Decrease rate by 3 units/kg/hr 6 hrs      Results from last 7 days   Lab Units 04/08/19  0533 04/07/19  0538 04/06/19  0443  04/05/19  0717   INR  1.97* 1.74* 1.60*   < > 1.37*   HEMOGLOBIN g/dL  --   --  9.9*  --  11.4*   HEMATOCRIT %  --   --  33.1*  --  37.7   PLATELETS 10*3/mm3  --   --  279  --  289    < > = values in this interval not displayed.       Date   Time   Anti-Xa Current Rate (Unit/kg/hr) Bolus   (Units) Rate Change   (Unit/kg/hr) New Rate (Unit/kg/hr) Next   Anti-Xa Comments  Pump Check Daily   4/5 1055 pending -- 8000 +11.3 11.3 pending Spoke with ED Rn. Patient about to be transferred to dialysis. Will be started when they go to floor    4/5 2214 0.43 11.3 - - 11.3 [address]    4/6 0443 0.23 11.3 5000 +2 13 [address] Fern[address]   4/6 1408 0.53 13 -- -- 13 [address] ADILSON Siddiqui. Pump weight and rate checked   4/6 2018 0.46 13 -- -- 13 0600 D/w ADILSON Fernandez    4/7 0538 0.33 13 -- +1 14 [address] ADILSON Peters. Pump weight and rate verified    4/7 1418 0.29 14 -- +2 16 2[address] ADILSON Petres    4/7 2041 0.65 16 -- -- 16 0600 D/w ADILSON Garcia   4/8 0533 0.5 16 -- -- 16 0600 [address], verified                                                                                                                                         Thanks,  Andrez Orantes, [facility]    4/8/2019  7:07 AM

## 2019-04-08 NOTE — PLAN OF CARE
Problem: Patient Care Overview  Goal: Plan of Care Review  Outcome: Ongoing (interventions implemented as appropriate)   04/08/19 6886   Coping/Psychosocial   Plan of Care Reviewed With patient   Plan of Care Review   Progress improving   OTHER   Outcome Summary VSS, no c/o pain or discomfort this shift. Will continue to monitor

## 2019-04-08 NOTE — PROGRESS NOTES
Continued Stay Note  [facility]     Patient Name: Joy Bueno  MRN: [id number]  Today's Date: 4/8/2019    Admit Date: 4/5/2019    Discharge Plan     Row Name 04/08/19 0925       Plan    Plan  Back to [facility]     Patient/Family in Agreement with Plan  unable to assess    Plan Comments  In to see pt and she is off the floor in Dialysis. Called Keshawn at [facility] and notified her that pt has an ambulance scheduled for 3pm today to return if medically ready. Updates efaxed to her per her request. CM will speak to pt once she returns from HD. Aretha Key RN, CM ext. [phone]        Discharge Codes    No documentation.       Expected Discharge Date and Time     Expected Discharge Date Expected Discharge Time    Apr 8, 2019             Aretha Key

## 2019-04-08 NOTE — PROGRESS NOTES
"Pharmacy Consult  -  Warfarin  Joy Bueno is a  68 y.o. female   Height - 162.6 cm (64\")  Weight - (!) 136 kg (300 lb 9.6 oz)    Consulting Provider: - Dr. Worrell  Indication: - DVT  Goal INR: - 2-3  Home Regimen:   - 8mg daily     Bridge Therapy: Yes   and unfractionated heparin    Drug-Drug Interactions with current regimen:   Amoxicillin- increased risk of bleeding              Doxycycline- increased risk of bleeding     Warfarin Dosing During Admission:  Date  4/5 4/6 4/7 4/8        INR  1.37 1.6 1.74 1.97        Dose  8mg 8mg 8mg 8mg           Education Provided: Patient will be going to SNF who will manage their warfarin     Labs:  Results from last 7 days   Lab Units 04/08/19  0533 04/07/19  0538 04/06/19  0443 04/05/19  1553 04/05/19  0717   INR  1.97* 1.74* 1.60* 1.46* 1.37*   APTT seconds  --   --   --   --  39.4*   HEMOGLOBIN g/dL  --   --  9.9*  --  11.4*   HEMATOCRIT %  --   --  33.1*  --  37.7   PLATELETS 10*3/mm3  --   --  279  --  289     Results from last 7 days   Lab Units 04/06/19  0443 04/05/19  0717   SODIUM mmol/L 137 138   POTASSIUM mmol/L 4.2 5.1   CHLORIDE mmol/L 104 105   CO2 mmol/L 23.0 23.0   BUN mg/dL 46* 78*   CREATININE mg/dL 3.40* 4.49*   CALCIUM mg/dL 9.0 9.1   BILIRUBIN mg/dL  --  0.3   ALK PHOS U/L  --  89   ALT (SGPT) U/L  --  13   AST (SGOT) U/L  --  11   GLUCOSE mg/dL 233* 153*     Current dietary intake: 100% of documented meals  Diet Order   Procedures   • Diet Regular; Cardiac, Consistent Carbohydrate, Renal     Assessment/Plan:     Pharmacy to dose warfarin for indication of DVT. Admission regimen warfarin 8mg daily.   Goal INR 2-3.    4/8 INR - 1.97, increased from 1.74    Continue warfarin 8mg daily  Heparin bridge until INR >2  Monitor s/s bleeding/clotting, clinical status, dietary intake and drug-drug interactions  Follow daily PT/INR and adjust dose accordingly     Thanks,  Andrez Orantes, [facility]   4/8/2019  8:31 AM              "

## 2019-04-27 ENCOUNTER — APPOINTMENT (OUTPATIENT)
Dept: GENERAL RADIOLOGY | Facility: HOSPITAL | Age: 69
End: 2019-04-27

## 2019-04-27 ENCOUNTER — APPOINTMENT (OUTPATIENT)
Dept: CT IMAGING | Facility: HOSPITAL | Age: 69
End: 2019-04-27

## 2019-04-27 ENCOUNTER — HOSPITAL ENCOUNTER (INPATIENT)
Facility: HOSPITAL | Age: 69
LOS: 3 days | Discharge: NURSING, REHAB OR LONG-TERM CARE | End: 2019-04-30
Attending: EMERGENCY MEDICINE | Admitting: HOSPITALIST

## 2019-04-27 DIAGNOSIS — R40.0 SOMNOLENCE: ICD-10-CM

## 2019-04-27 DIAGNOSIS — D72.825 BANDEMIA: ICD-10-CM

## 2019-04-27 DIAGNOSIS — J96.02 ACUTE RESPIRATORY FAILURE WITH HYPERCAPNIA (HCC): Primary | ICD-10-CM

## 2019-04-27 DIAGNOSIS — N18.6 END STAGE RENAL DISEASE (HCC): ICD-10-CM

## 2019-04-27 PROBLEM — G93.41 ENCEPHALOPATHY, METABOLIC: Status: ACTIVE | Noted: 2019-04-27

## 2019-04-27 LAB
ALBUMIN SERPL-MCNC: 3.3 G/DL (ref 3.5–5.2)
ALP SERPL-CCNC: 106 U/L (ref 39–117)
ALT SERPL W P-5'-P-CCNC: 8 U/L (ref 1–33)
AMMONIA BLD-SCNC: 44 UMOL/L (ref 11–51)
ANION GAP SERPL CALCULATED.3IONS-SCNC: 14 MMOL/L
AST SERPL-CCNC: 11 U/L (ref 1–32)
ATMOSPHERIC PRESS: ABNORMAL MMHG
BACTERIA UR QL AUTO: ABNORMAL /HPF
BASE EXCESS BLDV CALC-SCNC: -1 MMOL/L (ref -2–2)
BASOPHILS # BLD MANUAL: 0 10*3/MM3 (ref 0–0.2)
BASOPHILS NFR BLD AUTO: 0 % (ref 0–1.5)
BDY SITE: ABNORMAL
BILIRUB CONJ SERPL-MCNC: <0.2 MG/DL (ref 0.2–0.3)
BILIRUB INDIRECT SERPL-MCNC: ABNORMAL MG/DL
BILIRUB SERPL-MCNC: 0.4 MG/DL (ref 0.2–1.2)
BILIRUB UR QL STRIP: NEGATIVE
BODY TEMPERATURE: 37 C
BUN BLD-MCNC: 49 MG/DL (ref 8–23)
BUN/CREAT SERPL: 12 (ref 7–25)
CALCIUM SPEC-SCNC: 8.8 MG/DL (ref 8.6–10.5)
CHLORIDE SERPL-SCNC: 95 MMOL/L (ref 98–107)
CK SERPL-CCNC: 27 U/L (ref 20–180)
CLARITY UR: ABNORMAL
CO2 BLDA-SCNC: 27.8 MMOL/L (ref 23–27)
CO2 SERPL-SCNC: 24 MMOL/L (ref 22–29)
COHGB MFR BLD: 1.8 %
COLOR UR: YELLOW
CREAT BLD-MCNC: 4.09 MG/DL (ref 0.57–1)
D-LACTATE SERPL-SCNC: 1.2 MMOL/L (ref 0.5–2)
DEPRECATED RDW RBC AUTO: 67.8 FL (ref 37–54)
EOSINOPHIL # BLD MANUAL: 0.84 10*3/MM3 (ref 0–0.4)
EOSINOPHIL NFR BLD MANUAL: 5 % (ref 0.3–6.2)
ERYTHROCYTE [DISTWIDTH] IN BLOOD BY AUTOMATED COUNT: 20 % (ref 12.3–15.4)
GFR SERPL CREATININE-BSD FRML MDRD: 13 ML/MIN/1.73
GFR SERPL CREATININE-BSD FRML MDRD: ABNORMAL ML/MIN/1.73
GLUCOSE BLD-MCNC: 210 MG/DL (ref 65–99)
GLUCOSE BLDC GLUCOMTR-MCNC: 120 MG/DL (ref 70–130)
GLUCOSE BLDC GLUCOMTR-MCNC: 201 MG/DL (ref 70–130)
GLUCOSE UR STRIP-MCNC: NEGATIVE MG/DL
HCO3 BLDV-SCNC: 26.1 MMOL/L (ref 22–28)
HCT VFR BLD AUTO: 36.9 % (ref 34–46.6)
HGB BLD-MCNC: 11 G/DL (ref 12–15.9)
HGB BLDA-MCNC: 10.5 G/DL (ref 14–18)
HGB UR QL STRIP.AUTO: ABNORMAL
HOLD SPECIMEN: NORMAL
HOROWITZ INDEX BLD+IHG-RTO: 21 %
HYALINE CASTS UR QL AUTO: ABNORMAL /LPF
INR PPP: 3.13 (ref 0.85–1.16)
INR PPP: 3.2 (ref 0.85–1.16)
KETONES UR QL STRIP: ABNORMAL
LEUKOCYTE ESTERASE UR QL STRIP.AUTO: ABNORMAL
LYMPHOCYTES # BLD MANUAL: 2.01 10*3/MM3 (ref 0.7–3.1)
LYMPHOCYTES NFR BLD MANUAL: 12 % (ref 19.6–45.3)
LYMPHOCYTES NFR BLD MANUAL: 5 % (ref 5–12)
MCH RBC QN AUTO: 28.6 PG (ref 26.6–33)
MCHC RBC AUTO-ENTMCNC: 29.8 G/DL (ref 31.5–35.7)
MCV RBC AUTO: 96.1 FL (ref 79–97)
METHGB BLD QL: 0.7 %
MODALITY: ABNORMAL
MONOCYTES # BLD AUTO: 0.84 10*3/MM3 (ref 0.1–0.9)
NEUTROPHILS # BLD AUTO: 13.06 10*3/MM3 (ref 1.7–7)
NEUTROPHILS NFR BLD MANUAL: 50 % (ref 42.7–76)
NEUTS BAND NFR BLD MANUAL: 28 % (ref 0–5)
NITRITE UR QL STRIP: NEGATIVE
NOTE: ABNORMAL
OXYHGB MFR BLDV: 62.5 %
PCO2 BLDV: 54.1 MM HG (ref 41–51)
PH BLDV: 7.29 PH UNITS
PH UR STRIP.AUTO: <=5 [PH] (ref 5–8)
PLAT MORPH BLD: NORMAL
PLATELET # BLD AUTO: 313 10*3/MM3 (ref 140–450)
PMV BLD AUTO: 10.6 FL (ref 6–12)
PO2 BLDV: 36 MM HG (ref 27–53)
POLYCHROMASIA BLD QL SMEAR: ABNORMAL
POTASSIUM BLD-SCNC: 5 MMOL/L (ref 3.5–5.2)
PROCALCITONIN SERPL-MCNC: 0.36 NG/ML (ref 0.1–0.25)
PROT SERPL-MCNC: 7.4 G/DL (ref 6–8.5)
PROT UR QL STRIP: ABNORMAL
PROTHROMBIN TIME: 31 SECONDS (ref 11.2–14.3)
PROTHROMBIN TIME: 31.5 SECONDS (ref 11.2–14.3)
RBC # BLD AUTO: 3.84 10*6/MM3 (ref 3.77–5.28)
RBC # UR: ABNORMAL /HPF
REF LAB TEST METHOD: ABNORMAL
SODIUM BLD-SCNC: 133 MMOL/L (ref 136–145)
SP GR UR STRIP: 1.02 (ref 1–1.03)
SQUAMOUS #/AREA URNS HPF: ABNORMAL /HPF
UROBILINOGEN UR QL STRIP: ABNORMAL
VENTILATOR MODE: ABNORMAL
WBC MORPH BLD: NORMAL
WBC NRBC COR # BLD: 16.74 10*3/MM3 (ref 3.4–10.8)
WBC UR QL AUTO: ABNORMAL /HPF
WHOLE BLOOD HOLD SPECIMEN: NORMAL
WHOLE BLOOD HOLD SPECIMEN: NORMAL
YEAST URNS QL MICRO: ABNORMAL /HPF

## 2019-04-27 PROCEDURE — 87040 BLOOD CULTURE FOR BACTERIA: CPT | Performed by: HOSPITALIST

## 2019-04-27 PROCEDURE — 80048 BASIC METABOLIC PNL TOTAL CA: CPT | Performed by: EMERGENCY MEDICINE

## 2019-04-27 PROCEDURE — 82962 GLUCOSE BLOOD TEST: CPT

## 2019-04-27 PROCEDURE — 85610 PROTHROMBIN TIME: CPT | Performed by: HOSPITALIST

## 2019-04-27 PROCEDURE — 94799 UNLISTED PULMONARY SVC/PX: CPT

## 2019-04-27 PROCEDURE — 82805 BLOOD GASES W/O2 SATURATION: CPT

## 2019-04-27 PROCEDURE — 99284 EMERGENCY DEPT VISIT MOD MDM: CPT

## 2019-04-27 PROCEDURE — 71045 X-RAY EXAM CHEST 1 VIEW: CPT

## 2019-04-27 PROCEDURE — 70450 CT HEAD/BRAIN W/O DYE: CPT

## 2019-04-27 PROCEDURE — 93005 ELECTROCARDIOGRAM TRACING: CPT

## 2019-04-27 PROCEDURE — 82820 HEMOGLOBIN-OXYGEN AFFINITY: CPT

## 2019-04-27 PROCEDURE — 84145 PROCALCITONIN (PCT): CPT | Performed by: HOSPITALIST

## 2019-04-27 PROCEDURE — 85025 COMPLETE CBC W/AUTO DIFF WBC: CPT | Performed by: EMERGENCY MEDICINE

## 2019-04-27 PROCEDURE — 25010000002 VANCOMYCIN 1 G RECONSTITUTED SOLUTION: Performed by: HOSPITALIST

## 2019-04-27 PROCEDURE — 25010000002 PIPERACILLIN-TAZOBACTAM: Performed by: HOSPITALIST

## 2019-04-27 PROCEDURE — 93005 ELECTROCARDIOGRAM TRACING: CPT | Performed by: EMERGENCY MEDICINE

## 2019-04-27 PROCEDURE — 73200 CT UPPER EXTREMITY W/O DYE: CPT

## 2019-04-27 PROCEDURE — 63710000001 INSULIN LISPRO (HUMAN) PER 5 UNITS: Performed by: HOSPITALIST

## 2019-04-27 PROCEDURE — 71250 CT THORAX DX C-: CPT

## 2019-04-27 PROCEDURE — 85007 BL SMEAR W/DIFF WBC COUNT: CPT | Performed by: EMERGENCY MEDICINE

## 2019-04-27 PROCEDURE — 81001 URINALYSIS AUTO W/SCOPE: CPT | Performed by: HOSPITALIST

## 2019-04-27 PROCEDURE — 94660 CPAP INITIATION&MGMT: CPT

## 2019-04-27 PROCEDURE — 80076 HEPATIC FUNCTION PANEL: CPT | Performed by: EMERGENCY MEDICINE

## 2019-04-27 PROCEDURE — 83605 ASSAY OF LACTIC ACID: CPT | Performed by: HOSPITALIST

## 2019-04-27 PROCEDURE — 82140 ASSAY OF AMMONIA: CPT | Performed by: EMERGENCY MEDICINE

## 2019-04-27 PROCEDURE — 74176 CT ABD & PELVIS W/O CONTRAST: CPT

## 2019-04-27 PROCEDURE — 99223 1ST HOSP IP/OBS HIGH 75: CPT | Performed by: HOSPITALIST

## 2019-04-27 PROCEDURE — 87086 URINE CULTURE/COLONY COUNT: CPT | Performed by: HOSPITALIST

## 2019-04-27 PROCEDURE — 82550 ASSAY OF CK (CPK): CPT | Performed by: HOSPITALIST

## 2019-04-27 PROCEDURE — 85060 BLOOD SMEAR INTERPRETATION: CPT | Performed by: EMERGENCY MEDICINE

## 2019-04-27 PROCEDURE — 85610 PROTHROMBIN TIME: CPT

## 2019-04-27 RX ORDER — SEVELAMER CARBONATE FOR ORAL SUSPENSION 800 MG/1
800 POWDER, FOR SUSPENSION ORAL
Status: DISCONTINUED | OUTPATIENT
Start: 2019-04-27 | End: 2019-04-30 | Stop reason: HOSPADM

## 2019-04-27 RX ORDER — PANTOPRAZOLE SODIUM 40 MG/1
40 TABLET, DELAYED RELEASE ORAL DAILY
Status: DISCONTINUED | OUTPATIENT
Start: 2019-04-28 | End: 2019-04-30 | Stop reason: HOSPADM

## 2019-04-27 RX ORDER — ACETAMINOPHEN 325 MG/1
650 TABLET ORAL EVERY 6 HOURS PRN
Status: DISCONTINUED | OUTPATIENT
Start: 2019-04-27 | End: 2019-04-30 | Stop reason: HOSPADM

## 2019-04-27 RX ORDER — SODIUM CHLORIDE 0.9 % (FLUSH) 0.9 %
10 SYRINGE (ML) INJECTION AS NEEDED
Status: DISCONTINUED | OUTPATIENT
Start: 2019-04-27 | End: 2019-04-30 | Stop reason: HOSPADM

## 2019-04-27 RX ORDER — ONDANSETRON 4 MG/1
4 TABLET, FILM COATED ORAL EVERY 6 HOURS PRN
Status: DISCONTINUED | OUTPATIENT
Start: 2019-04-27 | End: 2019-04-30 | Stop reason: HOSPADM

## 2019-04-27 RX ORDER — NICOTINE POLACRILEX 4 MG
15 LOZENGE BUCCAL
Status: DISCONTINUED | OUTPATIENT
Start: 2019-04-27 | End: 2019-04-30 | Stop reason: HOSPADM

## 2019-04-27 RX ORDER — WARFARIN SODIUM 2.5 MG/1
2.5 TABLET ORAL
Status: COMPLETED | OUTPATIENT
Start: 2019-04-27 | End: 2019-04-27

## 2019-04-27 RX ORDER — CALCITRIOL 0.25 UG/1
0.25 CAPSULE, LIQUID FILLED ORAL DAILY
Status: DISCONTINUED | OUTPATIENT
Start: 2019-04-28 | End: 2019-04-30 | Stop reason: HOSPADM

## 2019-04-27 RX ORDER — CARVEDILOL 12.5 MG/1
25 TABLET ORAL EVERY 12 HOURS SCHEDULED
Status: DISCONTINUED | OUTPATIENT
Start: 2019-04-27 | End: 2019-04-30 | Stop reason: HOSPADM

## 2019-04-27 RX ORDER — ONDANSETRON 2 MG/ML
4 INJECTION INTRAMUSCULAR; INTRAVENOUS EVERY 6 HOURS PRN
Status: DISCONTINUED | OUTPATIENT
Start: 2019-04-27 | End: 2019-04-30 | Stop reason: HOSPADM

## 2019-04-27 RX ORDER — SODIUM CHLORIDE 0.9 % (FLUSH) 0.9 %
3-10 SYRINGE (ML) INJECTION AS NEEDED
Status: DISCONTINUED | OUTPATIENT
Start: 2019-04-27 | End: 2019-04-30 | Stop reason: HOSPADM

## 2019-04-27 RX ORDER — DOCUSATE SODIUM 100 MG/1
100 CAPSULE, LIQUID FILLED ORAL 2 TIMES DAILY
Status: DISCONTINUED | OUTPATIENT
Start: 2019-04-27 | End: 2019-04-30 | Stop reason: HOSPADM

## 2019-04-27 RX ORDER — ISOSORBIDE MONONITRATE 30 MG/1
30 TABLET, EXTENDED RELEASE ORAL DAILY
Status: DISCONTINUED | OUTPATIENT
Start: 2019-04-28 | End: 2019-04-30 | Stop reason: HOSPADM

## 2019-04-27 RX ORDER — SENNA AND DOCUSATE SODIUM 50; 8.6 MG/1; MG/1
1 TABLET, FILM COATED ORAL 2 TIMES DAILY PRN
COMMUNITY
End: 2019-04-30 | Stop reason: HOSPADM

## 2019-04-27 RX ORDER — SODIUM CHLORIDE 9 MG/ML
50 INJECTION, SOLUTION INTRAVENOUS CONTINUOUS
Status: DISCONTINUED | OUTPATIENT
Start: 2019-04-27 | End: 2019-04-30 | Stop reason: HOSPADM

## 2019-04-27 RX ORDER — DEXTROSE MONOHYDRATE 25 G/50ML
25 INJECTION, SOLUTION INTRAVENOUS
Status: DISCONTINUED | OUTPATIENT
Start: 2019-04-27 | End: 2019-04-30 | Stop reason: HOSPADM

## 2019-04-27 RX ORDER — BUDESONIDE AND FORMOTEROL FUMARATE DIHYDRATE 160; 4.5 UG/1; UG/1
2 AEROSOL RESPIRATORY (INHALATION)
Status: DISCONTINUED | OUTPATIENT
Start: 2019-04-27 | End: 2019-04-30 | Stop reason: HOSPADM

## 2019-04-27 RX ORDER — WARFARIN SODIUM 4 MG/1
4 TABLET ORAL
Status: DISCONTINUED | OUTPATIENT
Start: 2019-04-27 | End: 2019-04-27

## 2019-04-27 RX ORDER — IPRATROPIUM BROMIDE AND ALBUTEROL SULFATE 2.5; .5 MG/3ML; MG/3ML
3 SOLUTION RESPIRATORY (INHALATION) EVERY 6 HOURS PRN
Status: DISCONTINUED | OUTPATIENT
Start: 2019-04-27 | End: 2019-04-30 | Stop reason: HOSPADM

## 2019-04-27 RX ORDER — SODIUM CHLORIDE 0.9 % (FLUSH) 0.9 %
3 SYRINGE (ML) INJECTION EVERY 12 HOURS SCHEDULED
Status: DISCONTINUED | OUTPATIENT
Start: 2019-04-27 | End: 2019-04-30 | Stop reason: HOSPADM

## 2019-04-27 RX ADMIN — DOCUSATE SODIUM 100 MG: 100 CAPSULE, LIQUID FILLED ORAL at 22:01

## 2019-04-27 RX ADMIN — VANCOMYCIN HYDROCHLORIDE 2500 MG: 1 INJECTION, POWDER, LYOPHILIZED, FOR SOLUTION INTRAVENOUS at 17:06

## 2019-04-27 RX ADMIN — PIPERACILLIN AND TAZOBACTAM 2.25 G: 2; .25 INJECTION, POWDER, LYOPHILIZED, FOR SOLUTION INTRAVENOUS; PARENTERAL at 22:00

## 2019-04-27 RX ADMIN — SODIUM CHLORIDE 1000 ML: 9 INJECTION, SOLUTION INTRAVENOUS at 14:42

## 2019-04-27 RX ADMIN — SEVELAMER CARBONATE 0.8 G: 800 POWDER, FOR SUSPENSION ORAL at 18:03

## 2019-04-27 RX ADMIN — SODIUM CHLORIDE 50 ML/HR: 9 INJECTION, SOLUTION INTRAVENOUS at 17:06

## 2019-04-27 RX ADMIN — WARFARIN SODIUM 2.5 MG: 2.5 TABLET ORAL at 18:03

## 2019-04-27 RX ADMIN — BUDESONIDE AND FORMOTEROL FUMARATE DIHYDRATE 2 PUFF: 160; 4.5 AEROSOL RESPIRATORY (INHALATION) at 19:55

## 2019-04-27 RX ADMIN — ACETAMINOPHEN 650 MG: 325 TABLET, FILM COATED ORAL at 22:01

## 2019-04-28 ENCOUNTER — APPOINTMENT (OUTPATIENT)
Dept: GENERAL RADIOLOGY | Facility: HOSPITAL | Age: 69
End: 2019-04-28

## 2019-04-28 LAB
ANION GAP SERPL CALCULATED.3IONS-SCNC: 14 MMOL/L
BACTERIA SPEC AEROBE CULT: NO GROWTH
BASOPHILS # BLD AUTO: 0.02 10*3/MM3 (ref 0–0.2)
BASOPHILS NFR BLD AUTO: 0.2 % (ref 0–1.5)
BUN BLD-MCNC: 60 MG/DL (ref 8–23)
BUN/CREAT SERPL: 12.5 (ref 7–25)
CALCIUM SPEC-SCNC: 8.5 MG/DL (ref 8.6–10.5)
CHLORIDE SERPL-SCNC: 100 MMOL/L (ref 98–107)
CO2 SERPL-SCNC: 20 MMOL/L (ref 22–29)
CREAT BLD-MCNC: 4.79 MG/DL (ref 0.57–1)
CYTOLOGIST CVX/VAG CYTO: NORMAL
DEPRECATED RDW RBC AUTO: 67.8 FL (ref 37–54)
EOSINOPHIL # BLD AUTO: 0.34 10*3/MM3 (ref 0–0.4)
EOSINOPHIL NFR BLD AUTO: 3.3 % (ref 0.3–6.2)
ERYTHROCYTE [DISTWIDTH] IN BLOOD BY AUTOMATED COUNT: 19.8 % (ref 12.3–15.4)
GFR SERPL CREATININE-BSD FRML MDRD: 11 ML/MIN/1.73
GFR SERPL CREATININE-BSD FRML MDRD: ABNORMAL ML/MIN/1.73
GLUCOSE BLD-MCNC: 195 MG/DL (ref 65–99)
GLUCOSE BLDC GLUCOMTR-MCNC: 169 MG/DL (ref 70–130)
GLUCOSE BLDC GLUCOMTR-MCNC: 209 MG/DL (ref 70–130)
GLUCOSE BLDC GLUCOMTR-MCNC: 237 MG/DL (ref 70–130)
GLUCOSE BLDC GLUCOMTR-MCNC: 268 MG/DL (ref 70–130)
HCT VFR BLD AUTO: 32.6 % (ref 34–46.6)
HGB BLD-MCNC: 9.3 G/DL (ref 12–15.9)
IMM GRANULOCYTES # BLD AUTO: 0.08 10*3/MM3 (ref 0–0.05)
IMM GRANULOCYTES NFR BLD AUTO: 0.8 % (ref 0–0.5)
INR PPP: 3.61 (ref 0.85–1.16)
LYMPHOCYTES # BLD AUTO: 1.77 10*3/MM3 (ref 0.7–3.1)
LYMPHOCYTES NFR BLD AUTO: 16.9 % (ref 19.6–45.3)
MCH RBC QN AUTO: 27.7 PG (ref 26.6–33)
MCHC RBC AUTO-ENTMCNC: 28.5 G/DL (ref 31.5–35.7)
MCV RBC AUTO: 97 FL (ref 79–97)
MONOCYTES # BLD AUTO: 0.49 10*3/MM3 (ref 0.1–0.9)
MONOCYTES NFR BLD AUTO: 4.7 % (ref 5–12)
NEUTROPHILS # BLD AUTO: 7.83 10*3/MM3 (ref 1.7–7)
NEUTROPHILS NFR BLD AUTO: 74.9 % (ref 42.7–76)
PATH INTERP BLD-IMP: NORMAL
PLATELET # BLD AUTO: 249 10*3/MM3 (ref 140–450)
PMV BLD AUTO: 9.4 FL (ref 6–12)
POTASSIUM BLD-SCNC: 5.1 MMOL/L (ref 3.5–5.2)
PROTHROMBIN TIME: 34.6 SECONDS (ref 11.2–14.3)
RBC # BLD AUTO: 3.36 10*6/MM3 (ref 3.77–5.28)
SODIUM BLD-SCNC: 134 MMOL/L (ref 136–145)
VANCOMYCIN SERPL-MCNC: 20.8 MCG/ML (ref 5–40)
WBC NRBC COR # BLD: 10.45 10*3/MM3 (ref 3.4–10.8)

## 2019-04-28 PROCEDURE — 94799 UNLISTED PULMONARY SVC/PX: CPT

## 2019-04-28 PROCEDURE — 63710000001 INSULIN LISPRO (HUMAN) PER 5 UNITS: Performed by: HOSPITALIST

## 2019-04-28 PROCEDURE — 80048 BASIC METABOLIC PNL TOTAL CA: CPT | Performed by: HOSPITALIST

## 2019-04-28 PROCEDURE — 71045 X-RAY EXAM CHEST 1 VIEW: CPT

## 2019-04-28 PROCEDURE — 80202 ASSAY OF VANCOMYCIN: CPT

## 2019-04-28 PROCEDURE — 94660 CPAP INITIATION&MGMT: CPT

## 2019-04-28 PROCEDURE — 25010000002 PIPERACILLIN-TAZOBACTAM: Performed by: HOSPITALIST

## 2019-04-28 PROCEDURE — 85610 PROTHROMBIN TIME: CPT | Performed by: HOSPITALIST

## 2019-04-28 PROCEDURE — 85025 COMPLETE CBC W/AUTO DIFF WBC: CPT | Performed by: HOSPITALIST

## 2019-04-28 PROCEDURE — 99233 SBSQ HOSP IP/OBS HIGH 50: CPT | Performed by: HOSPITALIST

## 2019-04-28 PROCEDURE — 82962 GLUCOSE BLOOD TEST: CPT

## 2019-04-28 RX ADMIN — SODIUM CHLORIDE, PRESERVATIVE FREE 3 ML: 5 INJECTION INTRAVENOUS at 09:31

## 2019-04-28 RX ADMIN — PIPERACILLIN AND TAZOBACTAM 2.25 G: 2; .25 INJECTION, POWDER, LYOPHILIZED, FOR SOLUTION INTRAVENOUS; PARENTERAL at 21:00

## 2019-04-28 RX ADMIN — CARVEDILOL 25 MG: 12.5 TABLET, FILM COATED ORAL at 21:01

## 2019-04-28 RX ADMIN — PANTOPRAZOLE SODIUM 40 MG: 40 TABLET, DELAYED RELEASE ORAL at 09:29

## 2019-04-28 RX ADMIN — SEVELAMER CARBONATE 0.8 G: 800 POWDER, FOR SUSPENSION ORAL at 12:32

## 2019-04-28 RX ADMIN — SODIUM CHLORIDE, PRESERVATIVE FREE 3 ML: 5 INJECTION INTRAVENOUS at 21:02

## 2019-04-28 RX ADMIN — SEVELAMER CARBONATE 0.8 G: 800 POWDER, FOR SUSPENSION ORAL at 17:45

## 2019-04-28 RX ADMIN — INSULIN LISPRO 3 UNITS: 100 INJECTION, SOLUTION INTRAVENOUS; SUBCUTANEOUS at 21:01

## 2019-04-28 RX ADMIN — BUDESONIDE AND FORMOTEROL FUMARATE DIHYDRATE 2 PUFF: 160; 4.5 AEROSOL RESPIRATORY (INHALATION) at 19:47

## 2019-04-28 RX ADMIN — PIPERACILLIN AND TAZOBACTAM 2.25 G: 2; .25 INJECTION, POWDER, LYOPHILIZED, FOR SOLUTION INTRAVENOUS; PARENTERAL at 04:25

## 2019-04-28 RX ADMIN — BUDESONIDE AND FORMOTEROL FUMARATE DIHYDRATE 2 PUFF: 160; 4.5 AEROSOL RESPIRATORY (INHALATION) at 09:22

## 2019-04-28 RX ADMIN — INSULIN LISPRO 4 UNITS: 100 INJECTION, SOLUTION INTRAVENOUS; SUBCUTANEOUS at 12:33

## 2019-04-28 RX ADMIN — CALCITRIOL 0.25 MCG: 0.25 CAPSULE ORAL at 09:29

## 2019-04-28 RX ADMIN — ISOSORBIDE MONONITRATE 30 MG: 30 TABLET, EXTENDED RELEASE ORAL at 09:29

## 2019-04-28 RX ADMIN — DOCUSATE SODIUM 100 MG: 100 CAPSULE, LIQUID FILLED ORAL at 09:29

## 2019-04-28 RX ADMIN — SERTRALINE HYDROCHLORIDE 50 MG: 50 TABLET ORAL at 09:29

## 2019-04-28 RX ADMIN — INSULIN LISPRO 3 UNITS: 100 INJECTION, SOLUTION INTRAVENOUS; SUBCUTANEOUS at 17:45

## 2019-04-28 RX ADMIN — SEVELAMER CARBONATE 0.8 G: 800 POWDER, FOR SUSPENSION ORAL at 08:47

## 2019-04-28 RX ADMIN — INSULIN LISPRO 2 UNITS: 100 INJECTION, SOLUTION INTRAVENOUS; SUBCUTANEOUS at 08:47

## 2019-04-28 RX ADMIN — DOCUSATE SODIUM 100 MG: 100 CAPSULE, LIQUID FILLED ORAL at 21:01

## 2019-04-28 RX ADMIN — CARVEDILOL 25 MG: 12.5 TABLET, FILM COATED ORAL at 09:29

## 2019-04-28 RX ADMIN — PIPERACILLIN AND TAZOBACTAM 2.25 G: 2; .25 INJECTION, POWDER, LYOPHILIZED, FOR SOLUTION INTRAVENOUS; PARENTERAL at 12:32

## 2019-04-29 ENCOUNTER — APPOINTMENT (OUTPATIENT)
Dept: NEPHROLOGY | Facility: HOSPITAL | Age: 69
End: 2019-04-29

## 2019-04-29 LAB
GLUCOSE BLDC GLUCOMTR-MCNC: 138 MG/DL (ref 70–130)
GLUCOSE BLDC GLUCOMTR-MCNC: 191 MG/DL (ref 70–130)
GLUCOSE BLDC GLUCOMTR-MCNC: 208 MG/DL (ref 70–130)
GLUCOSE BLDC GLUCOMTR-MCNC: 209 MG/DL (ref 70–130)
INR PPP: 3.48 (ref 0.85–1.16)
PROTHROMBIN TIME: 33.6 SECONDS (ref 11.2–14.3)

## 2019-04-29 PROCEDURE — 63710000001 INSULIN LISPRO (HUMAN) PER 5 UNITS: Performed by: HOSPITALIST

## 2019-04-29 PROCEDURE — 94660 CPAP INITIATION&MGMT: CPT

## 2019-04-29 PROCEDURE — 63510000001 EPOETIN ALFA PER 1000 UNITS: Performed by: INTERNAL MEDICINE

## 2019-04-29 PROCEDURE — 82962 GLUCOSE BLOOD TEST: CPT

## 2019-04-29 PROCEDURE — 85610 PROTHROMBIN TIME: CPT | Performed by: HOSPITALIST

## 2019-04-29 PROCEDURE — 5A1D70Z PERFORMANCE OF URINARY FILTRATION, INTERMITTENT, LESS THAN 6 HOURS PER DAY: ICD-10-PCS | Performed by: INTERNAL MEDICINE

## 2019-04-29 PROCEDURE — 94799 UNLISTED PULMONARY SVC/PX: CPT

## 2019-04-29 PROCEDURE — 25010000002 PIPERACILLIN-TAZOBACTAM: Performed by: HOSPITALIST

## 2019-04-29 PROCEDURE — 99233 SBSQ HOSP IP/OBS HIGH 50: CPT | Performed by: HOSPITALIST

## 2019-04-29 RX ORDER — ALBUMIN (HUMAN) 12.5 G/50ML
12.5 SOLUTION INTRAVENOUS AS NEEDED
Status: ACTIVE | OUTPATIENT
Start: 2019-04-29 | End: 2019-04-29

## 2019-04-29 RX ORDER — MIRTAZAPINE 15 MG/1
15 TABLET, FILM COATED ORAL NIGHTLY
Status: DISCONTINUED | OUTPATIENT
Start: 2019-04-29 | End: 2019-04-30 | Stop reason: HOSPADM

## 2019-04-29 RX ORDER — AMOXICILLIN AND CLAVULANATE POTASSIUM 875; 125 MG/1; MG/1
1 TABLET, FILM COATED ORAL ONCE
Status: COMPLETED | OUTPATIENT
Start: 2019-04-29 | End: 2019-04-29

## 2019-04-29 RX ORDER — AMOXICILLIN AND CLAVULANATE POTASSIUM 875; 125 MG/1; MG/1
1 TABLET, FILM COATED ORAL EVERY 12 HOURS SCHEDULED
Status: DISCONTINUED | OUTPATIENT
Start: 2019-04-29 | End: 2019-04-29

## 2019-04-29 RX ADMIN — INSULIN LISPRO 2 UNITS: 100 INJECTION, SOLUTION INTRAVENOUS; SUBCUTANEOUS at 12:12

## 2019-04-29 RX ADMIN — CARVEDILOL 25 MG: 12.5 TABLET, FILM COATED ORAL at 20:18

## 2019-04-29 RX ADMIN — INSULIN LISPRO 3 UNITS: 100 INJECTION, SOLUTION INTRAVENOUS; SUBCUTANEOUS at 20:18

## 2019-04-29 RX ADMIN — SODIUM CHLORIDE, PRESERVATIVE FREE 3 ML: 5 INJECTION INTRAVENOUS at 20:18

## 2019-04-29 RX ADMIN — SODIUM CHLORIDE 50 ML/HR: 9 INJECTION, SOLUTION INTRAVENOUS at 03:59

## 2019-04-29 RX ADMIN — CARVEDILOL 25 MG: 12.5 TABLET, FILM COATED ORAL at 09:47

## 2019-04-29 RX ADMIN — PIPERACILLIN AND TAZOBACTAM 2.25 G: 2; .25 INJECTION, POWDER, LYOPHILIZED, FOR SOLUTION INTRAVENOUS; PARENTERAL at 03:59

## 2019-04-29 RX ADMIN — PANTOPRAZOLE SODIUM 40 MG: 40 TABLET, DELAYED RELEASE ORAL at 09:48

## 2019-04-29 RX ADMIN — CALCITRIOL 0.25 MCG: 0.25 CAPSULE ORAL at 09:48

## 2019-04-29 RX ADMIN — SEVELAMER CARBONATE 0.8 G: 800 POWDER, FOR SUSPENSION ORAL at 12:12

## 2019-04-29 RX ADMIN — ERYTHROPOIETIN 20000 UNITS: 20000 INJECTION, SOLUTION INTRAVENOUS; SUBCUTANEOUS at 09:50

## 2019-04-29 RX ADMIN — SEVELAMER CARBONATE 0.8 G: 800 POWDER, FOR SUSPENSION ORAL at 17:16

## 2019-04-29 RX ADMIN — INSULIN LISPRO 3 UNITS: 100 INJECTION, SOLUTION INTRAVENOUS; SUBCUTANEOUS at 17:15

## 2019-04-29 RX ADMIN — ISOSORBIDE MONONITRATE 30 MG: 30 TABLET, EXTENDED RELEASE ORAL at 09:48

## 2019-04-29 RX ADMIN — MIRTAZAPINE 15 MG: 15 TABLET, FILM COATED ORAL at 20:18

## 2019-04-29 RX ADMIN — DOCUSATE SODIUM 100 MG: 100 CAPSULE, LIQUID FILLED ORAL at 09:48

## 2019-04-29 RX ADMIN — BUDESONIDE AND FORMOTEROL FUMARATE DIHYDRATE 2 PUFF: 160; 4.5 AEROSOL RESPIRATORY (INHALATION) at 19:50

## 2019-04-29 RX ADMIN — SERTRALINE HYDROCHLORIDE 50 MG: 50 TABLET ORAL at 09:48

## 2019-04-29 RX ADMIN — AMOXICILLIN AND CLAVULANATE POTASSIUM 1 TABLET: 875; 125 TABLET, FILM COATED ORAL at 22:51

## 2019-04-30 VITALS
OXYGEN SATURATION: 96 % | HEIGHT: 64 IN | HEART RATE: 71 BPM | WEIGHT: 288 LBS | RESPIRATION RATE: 18 BRPM | BODY MASS INDEX: 49.17 KG/M2 | TEMPERATURE: 98.2 F | DIASTOLIC BLOOD PRESSURE: 70 MMHG | SYSTOLIC BLOOD PRESSURE: 145 MMHG

## 2019-04-30 LAB
GLUCOSE BLDC GLUCOMTR-MCNC: 163 MG/DL (ref 70–130)
GLUCOSE BLDC GLUCOMTR-MCNC: 174 MG/DL (ref 70–130)
GLUCOSE BLDC GLUCOMTR-MCNC: 190 MG/DL (ref 70–130)
INR PPP: 2.65 (ref 0.85–1.16)
PROTHROMBIN TIME: 27.2 SECONDS (ref 11.2–14.3)

## 2019-04-30 PROCEDURE — 94799 UNLISTED PULMONARY SVC/PX: CPT

## 2019-04-30 PROCEDURE — 99239 HOSP IP/OBS DSCHRG MGMT >30: CPT | Performed by: HOSPITALIST

## 2019-04-30 PROCEDURE — 85610 PROTHROMBIN TIME: CPT | Performed by: HOSPITALIST

## 2019-04-30 PROCEDURE — 82962 GLUCOSE BLOOD TEST: CPT

## 2019-04-30 PROCEDURE — 94660 CPAP INITIATION&MGMT: CPT

## 2019-04-30 PROCEDURE — 63710000001 INSULIN LISPRO (HUMAN) PER 5 UNITS: Performed by: HOSPITALIST

## 2019-04-30 RX ORDER — ALBUMIN (HUMAN) 12.5 G/50ML
12.5 SOLUTION INTRAVENOUS AS NEEDED
Status: DISCONTINUED | OUTPATIENT
Start: 2019-05-01 | End: 2019-04-30 | Stop reason: HOSPADM

## 2019-04-30 RX ORDER — WARFARIN SODIUM 4 MG/1
TABLET ORAL
Qty: 1 TABLET | Refills: 0 | Status: SHIPPED | OUTPATIENT
Start: 2019-04-30 | End: 2019-07-31

## 2019-04-30 RX ORDER — WARFARIN SODIUM 4 MG/1
4 TABLET ORAL
Status: DISCONTINUED | OUTPATIENT
Start: 2019-04-30 | End: 2019-04-30 | Stop reason: HOSPADM

## 2019-04-30 RX ORDER — WARFARIN SODIUM 4 MG/1
TABLET ORAL
Qty: 1 TABLET | Refills: 0 | Status: SHIPPED | OUTPATIENT
Start: 2019-04-30 | End: 2019-04-30

## 2019-04-30 RX ADMIN — INSULIN LISPRO 2 UNITS: 100 INJECTION, SOLUTION INTRAVENOUS; SUBCUTANEOUS at 08:41

## 2019-04-30 RX ADMIN — BUDESONIDE AND FORMOTEROL FUMARATE DIHYDRATE 2 PUFF: 160; 4.5 AEROSOL RESPIRATORY (INHALATION) at 08:50

## 2019-04-30 RX ADMIN — SEVELAMER CARBONATE 0.8 G: 800 POWDER, FOR SUSPENSION ORAL at 08:42

## 2019-04-30 RX ADMIN — ISOSORBIDE MONONITRATE 30 MG: 30 TABLET, EXTENDED RELEASE ORAL at 08:42

## 2019-04-30 RX ADMIN — SEVELAMER CARBONATE 0.8 G: 800 POWDER, FOR SUSPENSION ORAL at 12:59

## 2019-04-30 RX ADMIN — INSULIN LISPRO 2 UNITS: 100 INJECTION, SOLUTION INTRAVENOUS; SUBCUTANEOUS at 12:56

## 2019-04-30 RX ADMIN — CALCITRIOL 0.25 MCG: 0.25 CAPSULE ORAL at 08:42

## 2019-04-30 RX ADMIN — PANTOPRAZOLE SODIUM 40 MG: 40 TABLET, DELAYED RELEASE ORAL at 08:42

## 2019-04-30 RX ADMIN — WARFARIN SODIUM 4 MG: 4 TABLET ORAL at 17:37

## 2019-04-30 RX ADMIN — CARVEDILOL 25 MG: 12.5 TABLET, FILM COATED ORAL at 08:42

## 2019-04-30 RX ADMIN — SERTRALINE HYDROCHLORIDE 50 MG: 50 TABLET ORAL at 08:42

## 2019-05-02 LAB
BACTERIA SPEC AEROBE CULT: NORMAL
BACTERIA SPEC AEROBE CULT: NORMAL

## 2019-05-13 ENCOUNTER — APPOINTMENT (OUTPATIENT)
Dept: PREADMISSION TESTING | Facility: HOSPITAL | Age: 69
End: 2019-05-13

## 2019-05-21 ENCOUNTER — HOSPITAL ENCOUNTER (EMERGENCY)
Facility: HOSPITAL | Age: 69
Discharge: HOME | End: 2019-05-21
Attending: EMERGENCY MEDICINE | Admitting: EMERGENCY MEDICINE

## 2019-05-21 VITALS
HEIGHT: 66 IN | WEIGHT: 293 LBS | SYSTOLIC BLOOD PRESSURE: 147 MMHG | DIASTOLIC BLOOD PRESSURE: 76 MMHG | RESPIRATION RATE: 20 BRPM | TEMPERATURE: 98.3 F | OXYGEN SATURATION: 100 % | HEART RATE: 73 BPM | BODY MASS INDEX: 47.09 KG/M2

## 2019-05-21 DIAGNOSIS — K62.89 RECTAL PAIN: ICD-10-CM

## 2019-05-21 DIAGNOSIS — K56.41 FECAL IMPACTION (HCC): Primary | ICD-10-CM

## 2019-05-21 LAB
ALBUMIN SERPL-MCNC: 3.1 G/DL (ref 3.5–5.2)
ALBUMIN/GLOB SERPL: 0.7 G/DL
ALP SERPL-CCNC: 95 U/L (ref 39–117)
ALT SERPL W P-5'-P-CCNC: 9 U/L (ref 1–33)
ANION GAP SERPL CALCULATED.3IONS-SCNC: 12 MMOL/L
AST SERPL-CCNC: 10 U/L (ref 1–32)
B-HCG UR QL: NEGATIVE
BACTERIA UR QL AUTO: ABNORMAL /HPF
BASOPHILS # BLD AUTO: 0.03 10*3/MM3 (ref 0–0.2)
BASOPHILS NFR BLD AUTO: 0.3 % (ref 0–1.5)
BILIRUB SERPL-MCNC: 0.4 MG/DL (ref 0.2–1.2)
BILIRUB UR QL STRIP: NEGATIVE
BUN BLD-MCNC: 61 MG/DL (ref 8–23)
BUN/CREAT SERPL: 11.4 (ref 7–25)
CALCIUM SPEC-SCNC: 9.2 MG/DL (ref 8.6–10.5)
CHLORIDE SERPL-SCNC: 102 MMOL/L (ref 98–107)
CLARITY UR: ABNORMAL
CO2 SERPL-SCNC: 25 MMOL/L (ref 22–29)
COLOR UR: YELLOW
CREAT BLD-MCNC: 5.36 MG/DL (ref 0.57–1)
DEPRECATED RDW RBC AUTO: 64.6 FL (ref 37–54)
EOSINOPHIL # BLD AUTO: 0.3 10*3/MM3 (ref 0–0.4)
EOSINOPHIL NFR BLD AUTO: 3.4 % (ref 0.3–6.2)
ERYTHROCYTE [DISTWIDTH] IN BLOOD BY AUTOMATED COUNT: 18.7 % (ref 12.3–15.4)
GFR SERPL CREATININE-BSD FRML MDRD: 10 ML/MIN/1.73
GFR SERPL CREATININE-BSD FRML MDRD: ABNORMAL ML/MIN/1.73
GLOBULIN UR ELPH-MCNC: 4.3 GM/DL
GLUCOSE BLD-MCNC: 251 MG/DL (ref 65–99)
GLUCOSE UR STRIP-MCNC: ABNORMAL MG/DL
GRAN CASTS URNS QL MICRO: ABNORMAL /LPF
HCT VFR BLD AUTO: 38.4 % (ref 34–46.6)
HGB BLD-MCNC: 11.2 G/DL (ref 12–15.9)
HGB UR QL STRIP.AUTO: ABNORMAL
HOLD SPECIMEN: NORMAL
HOLD SPECIMEN: NORMAL
HYALINE CASTS UR QL AUTO: ABNORMAL /LPF
IMM GRANULOCYTES # BLD AUTO: 0.05 10*3/MM3 (ref 0–0.05)
IMM GRANULOCYTES NFR BLD AUTO: 0.6 % (ref 0–0.5)
INR PPP: 1.85 (ref 0.85–1.16)
KETONES UR QL STRIP: NEGATIVE
LEUKOCYTE ESTERASE UR QL STRIP.AUTO: NEGATIVE
LIPASE SERPL-CCNC: 38 U/L (ref 13–60)
LYMPHOCYTES # BLD AUTO: 1.87 10*3/MM3 (ref 0.7–3.1)
LYMPHOCYTES NFR BLD AUTO: 21.3 % (ref 19.6–45.3)
MCH RBC QN AUTO: 28.1 PG (ref 26.6–33)
MCHC RBC AUTO-ENTMCNC: 29.2 G/DL (ref 31.5–35.7)
MCV RBC AUTO: 96.2 FL (ref 79–97)
MONOCYTES # BLD AUTO: 0.35 10*3/MM3 (ref 0.1–0.9)
MONOCYTES NFR BLD AUTO: 4 % (ref 5–12)
NEUTROPHILS # BLD AUTO: 6.21 10*3/MM3 (ref 1.7–7)
NEUTROPHILS NFR BLD AUTO: 71 % (ref 42.7–76)
NITRITE UR QL STRIP: NEGATIVE
PH UR STRIP.AUTO: 5.5 [PH] (ref 5–8)
PLAT MORPH BLD: NORMAL
PLATELET # BLD AUTO: 335 10*3/MM3 (ref 140–450)
PMV BLD AUTO: 9.9 FL (ref 6–12)
POLYCHROMASIA BLD QL SMEAR: NORMAL
POTASSIUM BLD-SCNC: 5 MMOL/L (ref 3.5–5.2)
PROT SERPL-MCNC: 7.4 G/DL (ref 6–8.5)
PROT UR QL STRIP: ABNORMAL
PROTHROMBIN TIME: 20.5 SECONDS (ref 11.2–14.3)
RBC # BLD AUTO: 3.99 10*6/MM3 (ref 3.77–5.28)
RBC # UR: ABNORMAL /HPF
REF LAB TEST METHOD: ABNORMAL
SODIUM BLD-SCNC: 139 MMOL/L (ref 136–145)
SP GR UR STRIP: 1.02 (ref 1–1.03)
SQUAMOUS #/AREA URNS HPF: ABNORMAL /HPF
UROBILINOGEN UR QL STRIP: ABNORMAL
WBC MORPH BLD: NORMAL
WBC NRBC COR # BLD: 8.76 10*3/MM3 (ref 3.4–10.8)
WBC UR QL AUTO: ABNORMAL /HPF
WHOLE BLOOD HOLD SPECIMEN: NORMAL
WHOLE BLOOD HOLD SPECIMEN: NORMAL

## 2019-05-21 PROCEDURE — 85610 PROTHROMBIN TIME: CPT | Performed by: NURSE PRACTITIONER

## 2019-05-21 PROCEDURE — 85025 COMPLETE CBC W/AUTO DIFF WBC: CPT | Performed by: NURSE PRACTITIONER

## 2019-05-21 PROCEDURE — 25010000002 MORPHINE PER 10 MG: Performed by: EMERGENCY MEDICINE

## 2019-05-21 PROCEDURE — 81025 URINE PREGNANCY TEST: CPT | Performed by: NURSE PRACTITIONER

## 2019-05-21 PROCEDURE — 83690 ASSAY OF LIPASE: CPT | Performed by: NURSE PRACTITIONER

## 2019-05-21 PROCEDURE — 96372 THER/PROPH/DIAG INJ SC/IM: CPT

## 2019-05-21 PROCEDURE — 80053 COMPREHEN METABOLIC PANEL: CPT | Performed by: NURSE PRACTITIONER

## 2019-05-21 PROCEDURE — 85007 BL SMEAR W/DIFF WBC COUNT: CPT | Performed by: NURSE PRACTITIONER

## 2019-05-21 PROCEDURE — 99284 EMERGENCY DEPT VISIT MOD MDM: CPT

## 2019-05-21 PROCEDURE — P9612 CATHETERIZE FOR URINE SPEC: HCPCS

## 2019-05-21 PROCEDURE — 81001 URINALYSIS AUTO W/SCOPE: CPT | Performed by: NURSE PRACTITIONER

## 2019-05-21 RX ORDER — SODIUM CHLORIDE 0.9 % (FLUSH) 0.9 %
10 SYRINGE (ML) INJECTION AS NEEDED
Status: DISCONTINUED | OUTPATIENT
Start: 2019-05-21 | End: 2019-05-21 | Stop reason: HOSPADM

## 2019-05-21 RX ORDER — MORPHINE SULFATE 2 MG/ML
2 INJECTION, SOLUTION INTRAMUSCULAR; INTRAVENOUS ONCE
Status: DISCONTINUED | OUTPATIENT
Start: 2019-05-21 | End: 2019-05-21

## 2019-05-21 RX ORDER — MORPHINE SULFATE 2 MG/ML
2 INJECTION, SOLUTION INTRAMUSCULAR; INTRAVENOUS ONCE
Status: COMPLETED | OUTPATIENT
Start: 2019-05-21 | End: 2019-05-21

## 2019-05-21 RX ADMIN — MORPHINE SULFATE 2 MG: 2 INJECTION, SOLUTION INTRAMUSCULAR; INTRAVENOUS at 15:55

## 2019-06-04 ENCOUNTER — APPOINTMENT (OUTPATIENT)
Dept: PREADMISSION TESTING | Facility: HOSPITAL | Age: 69
End: 2019-06-04

## 2019-06-04 VITALS — HEIGHT: 64 IN | WEIGHT: 293 LBS | BODY MASS INDEX: 50.02 KG/M2

## 2019-06-04 LAB
ALBUMIN SERPL-MCNC: 3.5 G/DL (ref 3.5–5.2)
ALBUMIN/GLOB SERPL: 0.9 G/DL
ALP SERPL-CCNC: 100 U/L (ref 39–117)
ALT SERPL W P-5'-P-CCNC: 10 U/L (ref 1–33)
ANION GAP SERPL CALCULATED.3IONS-SCNC: 13 MMOL/L
AST SERPL-CCNC: 9 U/L (ref 1–32)
BILIRUB SERPL-MCNC: 0.7 MG/DL (ref 0.2–1.2)
BUN BLD-MCNC: 34 MG/DL (ref 8–23)
BUN/CREAT SERPL: 9.7 (ref 7–25)
CALCIUM SPEC-SCNC: 9.3 MG/DL (ref 8.6–10.5)
CHLORIDE SERPL-SCNC: 95 MMOL/L (ref 98–107)
CO2 SERPL-SCNC: 27 MMOL/L (ref 22–29)
CREAT BLD-MCNC: 3.52 MG/DL (ref 0.57–1)
DEPRECATED RDW RBC AUTO: 63 FL (ref 37–54)
ERYTHROCYTE [DISTWIDTH] IN BLOOD BY AUTOMATED COUNT: 18.2 % (ref 12.3–15.4)
GFR SERPL CREATININE-BSD FRML MDRD: 16 ML/MIN/1.73
GLOBULIN UR ELPH-MCNC: 3.9 GM/DL
GLUCOSE BLD-MCNC: 211 MG/DL (ref 65–99)
GLUCOSE BLDC GLUCOMTR-MCNC: 185 MG/DL (ref 70–130)
HCT VFR BLD AUTO: 40.1 % (ref 34–46.6)
HGB BLD-MCNC: 12.1 G/DL (ref 12–15.9)
INR PPP: 1.54 (ref 0.85–1.16)
MCH RBC QN AUTO: 28.4 PG (ref 26.6–33)
MCHC RBC AUTO-ENTMCNC: 30.2 G/DL (ref 31.5–35.7)
MCV RBC AUTO: 94.1 FL (ref 79–97)
PLATELET # BLD AUTO: 238 10*3/MM3 (ref 140–450)
PMV BLD AUTO: 10.1 FL (ref 6–12)
POTASSIUM BLD-SCNC: 4.5 MMOL/L (ref 3.5–5.2)
PROT SERPL-MCNC: 7.4 G/DL (ref 6–8.5)
PROTHROMBIN TIME: 17.8 SECONDS (ref 11.2–14.3)
RBC # BLD AUTO: 4.26 10*6/MM3 (ref 3.77–5.28)
SODIUM BLD-SCNC: 135 MMOL/L (ref 136–145)
WBC NRBC COR # BLD: 10.03 10*3/MM3 (ref 3.4–10.8)

## 2019-06-04 PROCEDURE — 85610 PROTHROMBIN TIME: CPT | Performed by: SURGERY

## 2019-06-04 PROCEDURE — 80053 COMPREHEN METABOLIC PANEL: CPT | Performed by: SURGERY

## 2019-06-04 PROCEDURE — 36415 COLL VENOUS BLD VENIPUNCTURE: CPT

## 2019-06-04 PROCEDURE — 85027 COMPLETE CBC AUTOMATED: CPT | Performed by: SURGERY

## 2019-06-04 PROCEDURE — 82962 GLUCOSE BLOOD TEST: CPT

## 2019-06-04 RX ORDER — ERGOCALCIFEROL 1.25 MG/1
50000 CAPSULE ORAL WEEKLY
COMMUNITY
End: 2021-02-17 | Stop reason: HOSPADM

## 2019-06-04 NOTE — PAT
Patient to apply Chlorhexadine wipes  to surgical area (as instructed) the night before procedure and the AM of procedure. Wipes provided.    Per Anesthesia Request, patient instructed not to take their ACE/ARB medications on the AM of surgery.    EKG CLEARED BY DR. BRAUN

## 2019-06-04 NOTE — PAT
SPOKE WITH BEULAH, SHE STATED SHE WOULD INSTRUCT THE NURSING HOME ON ARRIVAL TIME FOR THE DAY OF SURGERY AND DATE TO STOP COUMADIN AS INSTRUCTED BY DR. LANDEROS.      TOLD BEULAH OF ABNORMAL INR.  PATIENT STILL TAKING HER COUMADIN AS PRESCRIBED.

## 2019-06-13 ENCOUNTER — ANESTHESIA (OUTPATIENT)
Dept: PERIOP | Facility: HOSPITAL | Age: 69
End: 2019-06-13

## 2019-06-13 ENCOUNTER — HOSPITAL ENCOUNTER (OUTPATIENT)
Facility: HOSPITAL | Age: 69
Setting detail: HOSPITAL OUTPATIENT SURGERY
Discharge: NURSING, REHAB OR LONG-TERM CARE | End: 2019-06-13
Attending: SURGERY | Admitting: SURGERY

## 2019-06-13 ENCOUNTER — ANESTHESIA EVENT (OUTPATIENT)
Dept: PERIOP | Facility: HOSPITAL | Age: 69
End: 2019-06-13

## 2019-06-13 PROCEDURE — G0463 HOSPITAL OUTPT CLINIC VISIT: HCPCS | Performed by: SURGERY

## 2019-06-13 RX ORDER — FAMOTIDINE 20 MG/1
20 TABLET, FILM COATED ORAL ONCE
Status: DISCONTINUED | OUTPATIENT
Start: 2019-06-13 | End: 2019-06-13 | Stop reason: HOSPADM

## 2019-06-13 RX ORDER — SODIUM CHLORIDE 0.9 % (FLUSH) 0.9 %
3 SYRINGE (ML) INJECTION EVERY 12 HOURS SCHEDULED
Status: DISCONTINUED | OUTPATIENT
Start: 2019-06-13 | End: 2019-06-13 | Stop reason: HOSPADM

## 2019-06-13 RX ORDER — LIDOCAINE HYDROCHLORIDE 10 MG/ML
0.5 INJECTION, SOLUTION EPIDURAL; INFILTRATION; INTRACAUDAL; PERINEURAL ONCE AS NEEDED
Status: DISCONTINUED | OUTPATIENT
Start: 2019-06-13 | End: 2019-06-13 | Stop reason: HOSPADM

## 2019-06-13 RX ORDER — SODIUM CHLORIDE, SODIUM LACTATE, POTASSIUM CHLORIDE, CALCIUM CHLORIDE 600; 310; 30; 20 MG/100ML; MG/100ML; MG/100ML; MG/100ML
9 INJECTION, SOLUTION INTRAVENOUS CONTINUOUS
Status: DISCONTINUED | OUTPATIENT
Start: 2019-06-13 | End: 2019-06-13 | Stop reason: HOSPADM

## 2019-06-13 RX ORDER — FAMOTIDINE 10 MG/ML
20 INJECTION, SOLUTION INTRAVENOUS ONCE
Status: CANCELLED | OUTPATIENT
Start: 2019-06-13 | End: 2019-06-13

## 2019-06-13 RX ORDER — SODIUM CHLORIDE 0.9 % (FLUSH) 0.9 %
3-10 SYRINGE (ML) INJECTION AS NEEDED
Status: DISCONTINUED | OUTPATIENT
Start: 2019-06-13 | End: 2019-06-13 | Stop reason: HOSPADM

## 2019-07-22 ENCOUNTER — APPOINTMENT (OUTPATIENT)
Dept: GENERAL RADIOLOGY | Facility: HOSPITAL | Age: 69
End: 2019-07-22

## 2019-07-22 ENCOUNTER — HOSPITAL ENCOUNTER (EMERGENCY)
Facility: HOSPITAL | Age: 69
Discharge: HOME | End: 2019-07-22
Attending: EMERGENCY MEDICINE | Admitting: EMERGENCY MEDICINE

## 2019-07-22 VITALS
WEIGHT: 293 LBS | SYSTOLIC BLOOD PRESSURE: 115 MMHG | HEART RATE: 60 BPM | BODY MASS INDEX: 50.02 KG/M2 | DIASTOLIC BLOOD PRESSURE: 92 MMHG | OXYGEN SATURATION: 97 % | HEIGHT: 64 IN | TEMPERATURE: 97.9 F | RESPIRATION RATE: 16 BRPM

## 2019-07-22 DIAGNOSIS — N18.6 END STAGE RENAL DISEASE (HCC): ICD-10-CM

## 2019-07-22 DIAGNOSIS — R40.0 SOMNOLENCE: ICD-10-CM

## 2019-07-22 DIAGNOSIS — R07.89 CHEST WALL PAIN: Primary | ICD-10-CM

## 2019-07-22 DIAGNOSIS — E11.65 POORLY CONTROLLED TYPE 2 DIABETES MELLITUS (HCC): ICD-10-CM

## 2019-07-22 LAB
ALBUMIN SERPL-MCNC: 3.4 G/DL (ref 3.5–5.2)
ALBUMIN/GLOB SERPL: 0.8 G/DL
ALP SERPL-CCNC: 109 U/L (ref 39–117)
ALT SERPL W P-5'-P-CCNC: 8 U/L (ref 1–33)
ANION GAP SERPL CALCULATED.3IONS-SCNC: 13 MMOL/L (ref 5–15)
ARTERIAL PATENCY WRIST A: ABNORMAL
AST SERPL-CCNC: 13 U/L (ref 1–32)
ATMOSPHERIC PRESS: ABNORMAL MMHG
BASE EXCESS BLDA CALC-SCNC: 0.3 MMOL/L (ref 0–2)
BASOPHILS # BLD AUTO: 0.04 10*3/MM3 (ref 0–0.2)
BASOPHILS NFR BLD AUTO: 0.4 % (ref 0–1.5)
BDY SITE: ABNORMAL
BILIRUB SERPL-MCNC: 0.3 MG/DL (ref 0.2–1.2)
BODY TEMPERATURE: 37 C
BUN BLD-MCNC: 52 MG/DL (ref 8–23)
BUN/CREAT SERPL: 17.4 (ref 7–25)
CALCIUM SPEC-SCNC: 8.8 MG/DL (ref 8.6–10.5)
CHLORIDE SERPL-SCNC: 97 MMOL/L (ref 98–107)
CO2 BLDA-SCNC: 27.5 MMOL/L (ref 23–27)
CO2 SERPL-SCNC: 24 MMOL/L (ref 22–29)
COHGB MFR BLD: 1.4 % (ref 0–2)
CREAT BLD-MCNC: 2.98 MG/DL (ref 0.57–1)
DEPRECATED RDW RBC AUTO: 61.4 FL (ref 37–54)
EOSINOPHIL # BLD AUTO: 0.33 10*3/MM3 (ref 0–0.4)
EOSINOPHIL NFR BLD AUTO: 3.1 % (ref 0.3–6.2)
ERYTHROCYTE [DISTWIDTH] IN BLOOD BY AUTOMATED COUNT: 17.7 % (ref 12.3–15.4)
GFR SERPL CREATININE-BSD FRML MDRD: 19 ML/MIN/1.73
GLOBULIN UR ELPH-MCNC: 4.3 GM/DL
GLUCOSE BLD-MCNC: 305 MG/DL (ref 65–99)
HCO3 BLDA-SCNC: 26.1 MMOL/L (ref 20–26)
HCT VFR BLD AUTO: 38.2 % (ref 34–46.6)
HCT VFR BLD CALC: 33 %
HGB BLD-MCNC: 11.5 G/DL (ref 12–15.9)
HGB BLDA-MCNC: 10.8 G/DL (ref 14–18)
HOLD SPECIMEN: NORMAL
HOLD SPECIMEN: NORMAL
HOROWITZ INDEX BLD+IHG-RTO: 21 %
IMM GRANULOCYTES # BLD AUTO: 0.12 10*3/MM3 (ref 0–0.05)
IMM GRANULOCYTES NFR BLD AUTO: 1.1 % (ref 0–0.5)
INR PPP: 2.68 (ref 0.85–1.16)
LIPASE SERPL-CCNC: 80 U/L (ref 13–60)
LYMPHOCYTES # BLD AUTO: 1.35 10*3/MM3 (ref 0.7–3.1)
LYMPHOCYTES NFR BLD AUTO: 12.9 % (ref 19.6–45.3)
MCH RBC QN AUTO: 29.1 PG (ref 26.6–33)
MCHC RBC AUTO-ENTMCNC: 30.1 G/DL (ref 31.5–35.7)
MCV RBC AUTO: 96.7 FL (ref 79–97)
METHGB BLD QL: 1.2 % (ref 0–1.5)
MODALITY: ABNORMAL
MONOCYTES # BLD AUTO: 0.63 10*3/MM3 (ref 0.1–0.9)
MONOCYTES NFR BLD AUTO: 6 % (ref 5–12)
NEUTROPHILS # BLD AUTO: 8.02 10*3/MM3 (ref 1.7–7)
NEUTROPHILS NFR BLD AUTO: 76.5 % (ref 42.7–76)
NOTE: ABNORMAL
NRBC BLD AUTO-RTO: 0 /100 WBC (ref 0–0.2)
NT-PROBNP SERPL-MCNC: 1910 PG/ML (ref 5–900)
OXYHGB MFR BLDV: 92.5 % (ref 94–99)
PCO2 BLDA: 46.2 MM HG (ref 35–45)
PCO2 TEMP ADJ BLD: 46.2 MM HG (ref 35–45)
PH BLDA: 7.36 PH UNITS (ref 7.35–7.45)
PH, TEMP CORRECTED: 7.36 PH UNITS
PLATELET # BLD AUTO: 300 10*3/MM3 (ref 140–450)
PMV BLD AUTO: 10.3 FL (ref 6–12)
PO2 BLDA: 75.3 MM HG (ref 83–108)
PO2 TEMP ADJ BLD: 75.3 MM HG (ref 83–108)
POTASSIUM BLD-SCNC: 4.7 MMOL/L (ref 3.5–5.2)
PROT SERPL-MCNC: 7.7 G/DL (ref 6–8.5)
PROTHROMBIN TIME: 27.4 SECONDS (ref 11.2–14.3)
RBC # BLD AUTO: 3.95 10*6/MM3 (ref 3.77–5.28)
SODIUM BLD-SCNC: 134 MMOL/L (ref 136–145)
TROPONIN T SERPL-MCNC: 0.13 NG/ML (ref 0–0.03)
TROPONIN T SERPL-MCNC: 0.14 NG/ML (ref 0–0.03)
VENTILATOR MODE: ABNORMAL
WBC NRBC COR # BLD: 10.49 10*3/MM3 (ref 3.4–10.8)
WHOLE BLOOD HOLD SPECIMEN: NORMAL
WHOLE BLOOD HOLD SPECIMEN: NORMAL

## 2019-07-22 PROCEDURE — 85025 COMPLETE CBC W/AUTO DIFF WBC: CPT | Performed by: EMERGENCY MEDICINE

## 2019-07-22 PROCEDURE — 93005 ELECTROCARDIOGRAM TRACING: CPT | Performed by: EMERGENCY MEDICINE

## 2019-07-22 PROCEDURE — 80053 COMPREHEN METABOLIC PANEL: CPT | Performed by: EMERGENCY MEDICINE

## 2019-07-22 PROCEDURE — 83880 ASSAY OF NATRIURETIC PEPTIDE: CPT | Performed by: EMERGENCY MEDICINE

## 2019-07-22 PROCEDURE — 83690 ASSAY OF LIPASE: CPT | Performed by: EMERGENCY MEDICINE

## 2019-07-22 PROCEDURE — 82805 BLOOD GASES W/O2 SATURATION: CPT

## 2019-07-22 PROCEDURE — 36600 WITHDRAWAL OF ARTERIAL BLOOD: CPT

## 2019-07-22 PROCEDURE — 93005 ELECTROCARDIOGRAM TRACING: CPT

## 2019-07-22 PROCEDURE — 71045 X-RAY EXAM CHEST 1 VIEW: CPT

## 2019-07-22 PROCEDURE — 84484 ASSAY OF TROPONIN QUANT: CPT | Performed by: EMERGENCY MEDICINE

## 2019-07-22 PROCEDURE — 85610 PROTHROMBIN TIME: CPT | Performed by: EMERGENCY MEDICINE

## 2019-07-22 PROCEDURE — 99284 EMERGENCY DEPT VISIT MOD MDM: CPT

## 2019-07-22 RX ORDER — ONDANSETRON 4 MG/1
8 TABLET, FILM COATED ORAL ONCE
Status: COMPLETED | OUTPATIENT
Start: 2019-07-22 | End: 2019-07-22

## 2019-07-22 RX ORDER — KETOROLAC TROMETHAMINE 15 MG/ML
10 INJECTION, SOLUTION INTRAMUSCULAR; INTRAVENOUS ONCE
Status: DISCONTINUED | OUTPATIENT
Start: 2019-07-22 | End: 2019-07-22 | Stop reason: HOSPADM

## 2019-07-22 RX ORDER — SODIUM CHLORIDE 0.9 % (FLUSH) 0.9 %
10 SYRINGE (ML) INJECTION AS NEEDED
Status: DISCONTINUED | OUTPATIENT
Start: 2019-07-22 | End: 2019-07-22 | Stop reason: HOSPADM

## 2019-07-22 RX ADMIN — ONDANSETRON HYDROCHLORIDE 8 MG: 4 TABLET, FILM COATED ORAL at 10:36

## 2019-07-30 ENCOUNTER — HOSPITAL ENCOUNTER (EMERGENCY)
Facility: HOSPITAL | Age: 69
Discharge: HOME | End: 2019-07-30
Attending: EMERGENCY MEDICINE | Admitting: EMERGENCY MEDICINE

## 2019-07-30 VITALS
WEIGHT: 293 LBS | HEART RATE: 71 BPM | SYSTOLIC BLOOD PRESSURE: 174 MMHG | DIASTOLIC BLOOD PRESSURE: 74 MMHG | HEIGHT: 64 IN | RESPIRATION RATE: 20 BRPM | BODY MASS INDEX: 50.02 KG/M2 | TEMPERATURE: 98.3 F | OXYGEN SATURATION: 100 %

## 2019-07-30 DIAGNOSIS — T14.8XXA BLEEDING FROM WOUND: ICD-10-CM

## 2019-07-30 DIAGNOSIS — R79.1 SUPRATHERAPEUTIC INR: Primary | ICD-10-CM

## 2019-07-30 LAB
BASOPHILS # BLD AUTO: 0.03 10*3/MM3 (ref 0–0.2)
BASOPHILS NFR BLD AUTO: 0.3 % (ref 0–1.5)
DEPRECATED RDW RBC AUTO: 62.1 FL (ref 37–54)
EOSINOPHIL # BLD AUTO: 0.35 10*3/MM3 (ref 0–0.4)
EOSINOPHIL NFR BLD AUTO: 3.8 % (ref 0.3–6.2)
ERYTHROCYTE [DISTWIDTH] IN BLOOD BY AUTOMATED COUNT: 17.4 % (ref 12.3–15.4)
HCT VFR BLD AUTO: 35 % (ref 34–46.6)
HGB BLD-MCNC: 10.6 G/DL (ref 12–15.9)
IMM GRANULOCYTES # BLD AUTO: 0.1 10*3/MM3 (ref 0–0.05)
IMM GRANULOCYTES NFR BLD AUTO: 1.1 % (ref 0–0.5)
INR PPP: 3.98 (ref 0.85–1.16)
LYMPHOCYTES # BLD AUTO: 1.76 10*3/MM3 (ref 0.7–3.1)
LYMPHOCYTES NFR BLD AUTO: 19.3 % (ref 19.6–45.3)
MCH RBC QN AUTO: 29.5 PG (ref 26.6–33)
MCHC RBC AUTO-ENTMCNC: 30.3 G/DL (ref 31.5–35.7)
MCV RBC AUTO: 97.5 FL (ref 79–97)
MONOCYTES # BLD AUTO: 0.54 10*3/MM3 (ref 0.1–0.9)
MONOCYTES NFR BLD AUTO: 5.9 % (ref 5–12)
NEUTROPHILS # BLD AUTO: 6.35 10*3/MM3 (ref 1.7–7)
NEUTROPHILS NFR BLD AUTO: 69.6 % (ref 42.7–76)
NRBC BLD AUTO-RTO: 0.2 /100 WBC (ref 0–0.2)
PLATELET # BLD AUTO: 292 10*3/MM3 (ref 140–450)
PMV BLD AUTO: 11.1 FL (ref 6–12)
PROTHROMBIN TIME: 37.4 SECONDS (ref 11.2–14.3)
RBC # BLD AUTO: 3.59 10*6/MM3 (ref 3.77–5.28)
WBC NRBC COR # BLD: 9.13 10*3/MM3 (ref 3.4–10.8)

## 2019-07-30 PROCEDURE — 85610 PROTHROMBIN TIME: CPT | Performed by: EMERGENCY MEDICINE

## 2019-07-30 PROCEDURE — 85025 COMPLETE CBC W/AUTO DIFF WBC: CPT | Performed by: EMERGENCY MEDICINE

## 2019-07-30 PROCEDURE — 99283 EMERGENCY DEPT VISIT LOW MDM: CPT

## 2019-07-31 ENCOUNTER — APPOINTMENT (OUTPATIENT)
Dept: NEPHROLOGY | Facility: HOSPITAL | Age: 69
End: 2019-07-31

## 2019-07-31 ENCOUNTER — HOSPITAL ENCOUNTER (INPATIENT)
Facility: HOSPITAL | Age: 69
LOS: 1 days | Discharge: NURSING, REHAB OR LONG-TERM CARE | End: 2019-08-01
Attending: EMERGENCY MEDICINE | Admitting: HOSPITALIST

## 2019-07-31 DIAGNOSIS — Z99.2 END STAGE RENAL DISEASE ON DIALYSIS (HCC): ICD-10-CM

## 2019-07-31 DIAGNOSIS — E87.5 HYPERKALEMIA: Primary | ICD-10-CM

## 2019-07-31 DIAGNOSIS — D68.32 WARFARIN-INDUCED COAGULOPATHY (HCC): ICD-10-CM

## 2019-07-31 DIAGNOSIS — T45.515A WARFARIN-INDUCED COAGULOPATHY (HCC): ICD-10-CM

## 2019-07-31 DIAGNOSIS — N18.6 END STAGE RENAL DISEASE ON DIALYSIS (HCC): ICD-10-CM

## 2019-07-31 DIAGNOSIS — L02.212 CUTANEOUS ABSCESS OF BACK EXCLUDING BUTTOCKS: ICD-10-CM

## 2019-07-31 PROBLEM — R79.1 SUPRATHERAPEUTIC INR: Status: ACTIVE | Noted: 2019-07-31

## 2019-07-31 PROBLEM — R53.1 GENERALIZED WEAKNESS: Status: ACTIVE | Noted: 2019-07-31

## 2019-07-31 LAB
BUN BLDA-MCNC: 120 MG/DL (ref 8–26)
CA-I BLDA-SCNC: 1.25 MMOL/L (ref 1.2–1.32)
CHLORIDE BLDA-SCNC: 105 MMOL/L (ref 98–109)
CO2 BLDA-SCNC: 22 MMOL/L (ref 24–29)
CREAT BLDA-MCNC: 6.1 MG/DL (ref 0.6–1.3)
GLUCOSE BLDC GLUCOMTR-MCNC: 163 MG/DL (ref 70–130)
GLUCOSE BLDC GLUCOMTR-MCNC: 165 MG/DL (ref 70–130)
GLUCOSE BLDC GLUCOMTR-MCNC: 301 MG/DL (ref 70–130)
HCT VFR BLDA CALC: 34 % (ref 38–51)
HGB BLDA-MCNC: 11.6 G/DL (ref 12–17)
INR PPP: 3.8 (ref 0.8–1.2)
POTASSIUM BLDA-SCNC: 5.7 MMOL/L (ref 3.5–4.9)
PROTHROMBIN TIME: 43.1 SECONDS (ref 12.8–15.2)
SODIUM BLDA-SCNC: 139 MMOL/L (ref 138–146)

## 2019-07-31 PROCEDURE — 5A1D70Z PERFORMANCE OF URINARY FILTRATION, INTERMITTENT, LESS THAN 6 HOURS PER DAY: ICD-10-PCS | Performed by: INTERNAL MEDICINE

## 2019-07-31 PROCEDURE — G0378 HOSPITAL OBSERVATION PER HR: HCPCS

## 2019-07-31 PROCEDURE — 85014 HEMATOCRIT: CPT

## 2019-07-31 PROCEDURE — 25010000002 ALBUMIN HUMAN 25% PER 50 ML: Performed by: INTERNAL MEDICINE

## 2019-07-31 PROCEDURE — 99222 1ST HOSP IP/OBS MODERATE 55: CPT | Performed by: HOSPITALIST

## 2019-07-31 PROCEDURE — 63710000001 INSULIN LISPRO (HUMAN) PER 5 UNITS: Performed by: HOSPITALIST

## 2019-07-31 PROCEDURE — 82962 GLUCOSE BLOOD TEST: CPT

## 2019-07-31 PROCEDURE — 25010000002 HEPARIN (PORCINE) PER 1000 UNITS: Performed by: INTERNAL MEDICINE

## 2019-07-31 PROCEDURE — 94799 UNLISTED PULMONARY SVC/PX: CPT

## 2019-07-31 PROCEDURE — 99284 EMERGENCY DEPT VISIT MOD MDM: CPT

## 2019-07-31 PROCEDURE — P9047 ALBUMIN (HUMAN), 25%, 50ML: HCPCS | Performed by: INTERNAL MEDICINE

## 2019-07-31 PROCEDURE — 85610 PROTHROMBIN TIME: CPT

## 2019-07-31 PROCEDURE — 80047 BASIC METABLC PNL IONIZED CA: CPT

## 2019-07-31 RX ORDER — CARVEDILOL 12.5 MG/1
25 TABLET ORAL EVERY 12 HOURS SCHEDULED
Status: DISCONTINUED | OUTPATIENT
Start: 2019-07-31 | End: 2019-08-01 | Stop reason: HOSPADM

## 2019-07-31 RX ORDER — WARFARIN SODIUM 2.5 MG/1
2.5 TABLET ORAL
COMMUNITY
End: 2019-08-01 | Stop reason: HOSPADM

## 2019-07-31 RX ORDER — CALCITRIOL 0.25 UG/1
0.25 CAPSULE, LIQUID FILLED ORAL DAILY
Status: DISCONTINUED | OUTPATIENT
Start: 2019-07-31 | End: 2019-08-01 | Stop reason: HOSPADM

## 2019-07-31 RX ORDER — ALBUMIN (HUMAN) 12.5 G/50ML
25 SOLUTION INTRAVENOUS AS NEEDED
Status: DISCONTINUED | OUTPATIENT
Start: 2019-07-31 | End: 2019-08-01 | Stop reason: HOSPADM

## 2019-07-31 RX ORDER — LIDOCAINE HYDROCHLORIDE 10 MG/ML
INJECTION, SOLUTION EPIDURAL; INFILTRATION; INTRACAUDAL; PERINEURAL
Status: DISCONTINUED
Start: 2019-07-31 | End: 2019-07-31 | Stop reason: WASHOUT

## 2019-07-31 RX ORDER — ACETAMINOPHEN 500 MG
500 TABLET ORAL EVERY 8 HOURS PRN
Status: DISCONTINUED | OUTPATIENT
Start: 2019-07-31 | End: 2019-08-01 | Stop reason: HOSPADM

## 2019-07-31 RX ORDER — WARFARIN SODIUM 4 MG/1
4 TABLET ORAL
COMMUNITY
End: 2019-08-01 | Stop reason: HOSPADM

## 2019-07-31 RX ORDER — PANTOPRAZOLE SODIUM 40 MG/1
40 TABLET, DELAYED RELEASE ORAL
Status: DISCONTINUED | OUTPATIENT
Start: 2019-08-01 | End: 2019-08-01 | Stop reason: HOSPADM

## 2019-07-31 RX ORDER — SEVELAMER CARBONATE FOR ORAL SUSPENSION 800 MG/1
800 POWDER, FOR SUSPENSION ORAL
Status: DISCONTINUED | OUTPATIENT
Start: 2019-07-31 | End: 2019-08-01 | Stop reason: HOSPADM

## 2019-07-31 RX ORDER — DEXTROSE MONOHYDRATE 25 G/50ML
25 INJECTION, SOLUTION INTRAVENOUS
Status: DISCONTINUED | OUTPATIENT
Start: 2019-07-31 | End: 2019-08-01 | Stop reason: HOSPADM

## 2019-07-31 RX ORDER — SODIUM CHLORIDE 0.9 % (FLUSH) 0.9 %
3-10 SYRINGE (ML) INJECTION AS NEEDED
Status: DISCONTINUED | OUTPATIENT
Start: 2019-07-31 | End: 2019-08-01 | Stop reason: HOSPADM

## 2019-07-31 RX ORDER — IPRATROPIUM BROMIDE AND ALBUTEROL SULFATE 2.5; .5 MG/3ML; MG/3ML
3 SOLUTION RESPIRATORY (INHALATION) EVERY 6 HOURS PRN
Status: DISCONTINUED | OUTPATIENT
Start: 2019-07-31 | End: 2019-08-01 | Stop reason: HOSPADM

## 2019-07-31 RX ORDER — SODIUM CHLORIDE 0.9 % (FLUSH) 0.9 %
3 SYRINGE (ML) INJECTION EVERY 12 HOURS SCHEDULED
Status: DISCONTINUED | OUTPATIENT
Start: 2019-07-31 | End: 2019-08-01 | Stop reason: HOSPADM

## 2019-07-31 RX ORDER — ISOSORBIDE MONONITRATE 30 MG/1
30 TABLET, EXTENDED RELEASE ORAL
Status: DISCONTINUED | OUTPATIENT
Start: 2019-07-31 | End: 2019-08-01 | Stop reason: HOSPADM

## 2019-07-31 RX ORDER — ATORVASTATIN CALCIUM 10 MG/1
10 TABLET, FILM COATED ORAL NIGHTLY
Status: DISCONTINUED | OUTPATIENT
Start: 2019-07-31 | End: 2019-08-01 | Stop reason: HOSPADM

## 2019-07-31 RX ORDER — SACCHAROMYCES BOULARDII 250 MG
250 CAPSULE ORAL 2 TIMES DAILY
Status: DISCONTINUED | OUTPATIENT
Start: 2019-07-31 | End: 2019-08-01 | Stop reason: HOSPADM

## 2019-07-31 RX ORDER — NICOTINE POLACRILEX 4 MG
15 LOZENGE BUCCAL
Status: DISCONTINUED | OUTPATIENT
Start: 2019-07-31 | End: 2019-08-01 | Stop reason: HOSPADM

## 2019-07-31 RX ORDER — LIDOCAINE HYDROCHLORIDE AND EPINEPHRINE 10; 10 MG/ML; UG/ML
10 INJECTION, SOLUTION INFILTRATION; PERINEURAL ONCE
Status: COMPLETED | OUTPATIENT
Start: 2019-07-31 | End: 2019-07-31

## 2019-07-31 RX ORDER — DOCUSATE SODIUM 100 MG/1
100 CAPSULE, LIQUID FILLED ORAL 2 TIMES DAILY
Status: DISCONTINUED | OUTPATIENT
Start: 2019-07-31 | End: 2019-08-01 | Stop reason: SDUPTHER

## 2019-07-31 RX ORDER — HEPARIN SODIUM 1000 [USP'U]/ML
1800 INJECTION, SOLUTION INTRAVENOUS; SUBCUTANEOUS AS NEEDED
Status: DISCONTINUED | OUTPATIENT
Start: 2019-07-31 | End: 2019-08-01 | Stop reason: HOSPADM

## 2019-07-31 RX ORDER — DOCUSATE SODIUM 100 MG/1
100 CAPSULE, LIQUID FILLED ORAL 2 TIMES DAILY
Status: DISCONTINUED | OUTPATIENT
Start: 2019-07-31 | End: 2019-08-01 | Stop reason: HOSPADM

## 2019-07-31 RX ADMIN — Medication 250 MG: at 20:29

## 2019-07-31 RX ADMIN — ATORVASTATIN CALCIUM 10 MG: 10 TABLET, FILM COATED ORAL at 20:30

## 2019-07-31 RX ADMIN — ALBUMIN HUMAN 25 G: 0.25 SOLUTION INTRAVENOUS at 14:17

## 2019-07-31 RX ADMIN — MINERAL OIL, PETROLATUM, PHENYLEPHRINE HCL 1 APPLICATION: 14; 74.9; .25 OINTMENT RECTAL at 21:39

## 2019-07-31 RX ADMIN — ACETAMINOPHEN 500 MG: 500 TABLET, FILM COATED ORAL at 23:16

## 2019-07-31 RX ADMIN — SERTRALINE HYDROCHLORIDE 50 MG: 50 TABLET ORAL at 18:41

## 2019-07-31 RX ADMIN — SEVELAMER CARBONATE 0.8 G: 0.8 POWDER, FOR SUSPENSION ORAL at 20:30

## 2019-07-31 RX ADMIN — INSULIN LISPRO 2 UNITS: 100 INJECTION, SOLUTION INTRAVENOUS; SUBCUTANEOUS at 20:32

## 2019-07-31 RX ADMIN — ALBUMIN HUMAN 25 G: 0.25 SOLUTION INTRAVENOUS at 14:19

## 2019-07-31 RX ADMIN — CARVEDILOL 25 MG: 12.5 TABLET, FILM COATED ORAL at 20:29

## 2019-07-31 RX ADMIN — ISOSORBIDE MONONITRATE 30 MG: 30 TABLET, EXTENDED RELEASE ORAL at 18:41

## 2019-07-31 RX ADMIN — INSULIN LISPRO 2 UNITS: 100 INJECTION, SOLUTION INTRAVENOUS; SUBCUTANEOUS at 18:41

## 2019-07-31 RX ADMIN — DOCUSATE SODIUM 100 MG: 100 CAPSULE, LIQUID FILLED ORAL at 20:30

## 2019-07-31 RX ADMIN — POLYETHYLENE GLYCOL 3350 17 G: 17 POWDER, FOR SOLUTION ORAL at 20:29

## 2019-07-31 RX ADMIN — HEPARIN SODIUM 1800 UNITS: 1000 INJECTION, SOLUTION INTRAVENOUS; SUBCUTANEOUS at 18:02

## 2019-07-31 RX ADMIN — CALCITRIOL 0.25 MCG: 0.25 CAPSULE ORAL at 18:42

## 2019-07-31 RX ADMIN — LIDOCAINE HYDROCHLORIDE,EPINEPHRINE BITARTRATE 10 ML: 10; .01 INJECTION, SOLUTION INFILTRATION; PERINEURAL at 09:39

## 2019-08-01 VITALS
WEIGHT: 293 LBS | DIASTOLIC BLOOD PRESSURE: 92 MMHG | HEART RATE: 79 BPM | TEMPERATURE: 98.3 F | BODY MASS INDEX: 50.02 KG/M2 | HEIGHT: 64 IN | RESPIRATION RATE: 18 BRPM | SYSTOLIC BLOOD PRESSURE: 142 MMHG | OXYGEN SATURATION: 97 %

## 2019-08-01 LAB
ALBUMIN SERPL-MCNC: 3.4 G/DL (ref 3.5–5.2)
ALBUMIN/GLOB SERPL: 0.9 G/DL
ALP SERPL-CCNC: 81 U/L (ref 39–117)
ALT SERPL W P-5'-P-CCNC: 5 U/L (ref 1–33)
ANION GAP SERPL CALCULATED.3IONS-SCNC: 13 MMOL/L (ref 5–15)
AST SERPL-CCNC: 8 U/L (ref 1–32)
BILIRUB SERPL-MCNC: 0.5 MG/DL (ref 0.2–1.2)
BUN BLD-MCNC: 54 MG/DL (ref 8–23)
BUN/CREAT SERPL: 13.8 (ref 7–25)
CALCIUM SPEC-SCNC: 8.8 MG/DL (ref 8.6–10.5)
CHLORIDE SERPL-SCNC: 102 MMOL/L (ref 98–107)
CO2 SERPL-SCNC: 24 MMOL/L (ref 22–29)
CREAT BLD-MCNC: 3.92 MG/DL (ref 0.57–1)
DEPRECATED RDW RBC AUTO: 63.7 FL (ref 37–54)
ERYTHROCYTE [DISTWIDTH] IN BLOOD BY AUTOMATED COUNT: 18 % (ref 12.3–15.4)
GFR SERPL CREATININE-BSD FRML MDRD: 14 ML/MIN/1.73
GFR SERPL CREATININE-BSD FRML MDRD: ABNORMAL ML/MIN/1.73
GLOBULIN UR ELPH-MCNC: 3.6 GM/DL
GLUCOSE BLD-MCNC: 315 MG/DL (ref 65–99)
GLUCOSE BLDC GLUCOMTR-MCNC: 275 MG/DL (ref 70–130)
GLUCOSE BLDC GLUCOMTR-MCNC: 281 MG/DL (ref 70–130)
HCT VFR BLD AUTO: 32.5 % (ref 34–46.6)
HGB BLD-MCNC: 9.3 G/DL (ref 12–15.9)
INR PPP: 2.42 (ref 0.85–1.16)
MCH RBC QN AUTO: 28.4 PG (ref 26.6–33)
MCHC RBC AUTO-ENTMCNC: 28.6 G/DL (ref 31.5–35.7)
MCV RBC AUTO: 99.4 FL (ref 79–97)
PLATELET # BLD AUTO: 265 10*3/MM3 (ref 140–450)
PMV BLD AUTO: 10.5 FL (ref 6–12)
POTASSIUM BLD-SCNC: 5 MMOL/L (ref 3.5–5.2)
PROT SERPL-MCNC: 7 G/DL (ref 6–8.5)
PROTHROMBIN TIME: 25.4 SECONDS (ref 11.2–14.3)
RBC # BLD AUTO: 3.27 10*6/MM3 (ref 3.77–5.28)
SODIUM BLD-SCNC: 139 MMOL/L (ref 136–145)
WBC NRBC COR # BLD: 11.5 10*3/MM3 (ref 3.4–10.8)

## 2019-08-01 PROCEDURE — 94660 CPAP INITIATION&MGMT: CPT

## 2019-08-01 PROCEDURE — 99239 HOSP IP/OBS DSCHRG MGMT >30: CPT | Performed by: HOSPITALIST

## 2019-08-01 PROCEDURE — 85610 PROTHROMBIN TIME: CPT

## 2019-08-01 PROCEDURE — 82962 GLUCOSE BLOOD TEST: CPT

## 2019-08-01 PROCEDURE — 94799 UNLISTED PULMONARY SVC/PX: CPT

## 2019-08-01 PROCEDURE — 80053 COMPREHEN METABOLIC PANEL: CPT | Performed by: HOSPITALIST

## 2019-08-01 PROCEDURE — 85027 COMPLETE CBC AUTOMATED: CPT | Performed by: HOSPITALIST

## 2019-08-01 RX ORDER — WARFARIN SODIUM 6 MG/1
TABLET ORAL
Status: ON HOLD
Start: 2019-08-01 | End: 2019-08-22

## 2019-08-01 RX ORDER — WARFARIN SODIUM 1 MG/1
6.5 TABLET ORAL NIGHTLY
Start: 2019-08-01 | End: 2019-09-01

## 2019-08-01 RX ORDER — ALBUMIN (HUMAN) 12.5 G/50ML
25 SOLUTION INTRAVENOUS AS NEEDED
Status: DISCONTINUED | OUTPATIENT
Start: 2019-08-02 | End: 2019-08-01 | Stop reason: HOSPADM

## 2019-08-01 RX ADMIN — Medication 250 MG: at 09:07

## 2019-08-01 RX ADMIN — SERTRALINE HYDROCHLORIDE 50 MG: 50 TABLET ORAL at 09:08

## 2019-08-01 RX ADMIN — SEVELAMER CARBONATE 0.8 G: 0.8 POWDER, FOR SUSPENSION ORAL at 12:26

## 2019-08-01 RX ADMIN — PANTOPRAZOLE SODIUM 40 MG: 40 TABLET, DELAYED RELEASE ORAL at 06:09

## 2019-08-01 RX ADMIN — POLYETHYLENE GLYCOL 3350 17 G: 17 POWDER, FOR SOLUTION ORAL at 12:29

## 2019-08-01 RX ADMIN — CALCITRIOL 0.25 MCG: 0.25 CAPSULE ORAL at 09:07

## 2019-08-01 RX ADMIN — ACETAMINOPHEN 500 MG: 500 TABLET, FILM COATED ORAL at 12:27

## 2019-08-01 RX ADMIN — ISOSORBIDE MONONITRATE 30 MG: 30 TABLET, EXTENDED RELEASE ORAL at 09:08

## 2019-08-01 RX ADMIN — INSULIN LISPRO 6 UNITS: 100 INJECTION, SOLUTION INTRAVENOUS; SUBCUTANEOUS at 09:08

## 2019-08-01 RX ADMIN — CARVEDILOL 25 MG: 12.5 TABLET, FILM COATED ORAL at 09:08

## 2019-08-01 RX ADMIN — DOCUSATE SODIUM 100 MG: 100 CAPSULE, LIQUID FILLED ORAL at 09:08

## 2019-08-01 RX ADMIN — SEVELAMER CARBONATE 0.8 G: 0.8 POWDER, FOR SUSPENSION ORAL at 09:07

## 2019-08-01 RX ADMIN — INSULIN LISPRO 6 UNITS: 100 INJECTION, SOLUTION INTRAVENOUS; SUBCUTANEOUS at 12:26

## 2019-08-01 NOTE — PROGRESS NOTES
"   LOS: 1 day    Patient Care Team:  Goldy Dior MD as PCP - General (Internal Medicine)    Chief Complaint:  Not feeling well.     Subjective     Interval History:     Feeling better. No acute events overnight.     Review of Systems:   No CP or SOA    Objective     Vital Sign Min/Max for last 24 hours  Temp  Min: 96.8 °F (36 °C)  Max: 98.3 °F (36.8 °C)   BP  Min: 82/67  Max: 153/95   Pulse  Min: 66  Max: 82   Resp  Min: 18  Max: 23   SpO2  Min: 89 %  Max: 100 %   No Data Recorded   Weight  Min: 138 kg (305 lb 1.6 oz)  Max: 138 kg (305 lb 1.6 oz)     Flowsheet Rows      First Filed Value   Admission Height  162.6 cm (64\") Documented at 07/31/2019 0910   Admission Weight  133 kg (294 lb) Documented at 07/31/2019 0910          I/O this shift:  In: 240 [P.O.:240]  Out: -   I/O last 3 completed shifts:  In: 200 [IV Piggyback:200]  Out: 4500 [Other:4500]    Physical Exam:     General Appearance:    Alert, cooperative, in no acute distress   Head:    Normocephalic, without obvious abnormality, atraumatic               Neck:   No adenopathy, supple, trachea midline, no thyromegaly, no     carotid bruit, no JVD       Lungs:     Clear to auscultation,respirations regular, even and                   unlabored    Heart:    Regular rhythm and normal rate, normal S1 and S2, no            murmur, no gallop, no rub, no click       Abdomen:     Normal bowel sounds, no masses, no organomegaly, soft        non-tender, non-distended, no guarding, no rebound                 tenderness       Extremities:   Moves all extremities well, no edema, no cyanosis, no              redness               Neurologic:   No focal deficit noted.        WBC WBC   Date Value Ref Range Status   08/01/2019 11.50 (H) 3.40 - 10.80 10*3/mm3 Final   07/30/2019 9.13 3.40 - 10.80 10*3/mm3 Final      HGB Hemoglobin   Date Value Ref Range Status   08/01/2019 9.3 (L) 12.0 - 15.9 g/dL Final   07/31/2019 11.6 (L) 12.0 - 17.0 g/dL Final     Comment:     " Serial Number: [id number]:  [id number]   07/30/2019 10.6 (L) 12.0 - 15.9 g/dL Final      HCT Hematocrit   Date Value Ref Range Status   08/01/2019 32.5 (L) 34.0 - 46.6 % Final   07/31/2019 34 (L) 38 - 51 % Final   07/30/2019 35.0 34.0 - 46.6 % Final      Platlets No results found for: LABPLAT   MCV MCV   Date Value Ref Range Status   08/01/2019 99.4 (H) 79.0 - 97.0 fL Final   07/30/2019 97.5 (H) 79.0 - 97.0 fL Final          Sodium Sodium   Date Value Ref Range Status   08/01/2019 139 136 - 145 mmol/L Final      Potassium Potassium   Date Value Ref Range Status   08/01/2019 5.0 3.5 - 5.2 mmol/L Final      Chloride Chloride   Date Value Ref Range Status   08/01/2019 102 98 - 107 mmol/L Final      CO2 CO2   Date Value Ref Range Status   08/01/2019 24.0 22.0 - 29.0 mmol/L Final      BUN BUN   Date Value Ref Range Status   08/01/2019 54 (H) 8 - 23 mg/dL Final      Creatinine Creatinine   Date Value Ref Range Status   08/01/2019 3.92 (H) 0.57 - 1.00 mg/dL Final   07/31/2019 6.10 (H) 0.60 - 1.30 mg/dL Final      Calcium Calcium   Date Value Ref Range Status   08/01/2019 8.8 8.6 - 10.5 mg/dL Final      PO4 No results found for: CAPO4   Albumin Albumin   Date Value Ref Range Status   08/01/2019 3.40 (L) 3.50 - 5.20 g/dL Final      Magnesium No results found for: MG   Uric Acid No results found for: URICACID        Results Review:     I reviewed the patient's new clinical results.      atorvastatin 10 mg Oral Nightly   calcitriol 0.25 mcg Oral Daily   carvedilol 25 mg Oral Q12H   docusate sodium 100 mg Oral BID   insulin lispro 0-9 Units Subcutaneous 4x Daily With Meals & Nightly   isosorbide mononitrate 30 mg Oral Q24H   milk and molasses enema 1 enema Rectal Once   pantoprazole 40 mg Oral Q AM   polyethylene glycol 17 g Oral BID   saccharomyces boulardii 250 mg Oral BID   sertraline 50 mg Oral Daily   sevelamer 800 mg Oral TID With Meals   sodium chloride 3 mL Intravenous Q12H   warfarin 6.5 mg Oral Daily       Pharmacy to  dose warfarin        Medication Review:     Assessment/Plan       Hyperkalemia    Supratherapeutic INR    Generalized weakness      1- ESRD - MWF   2- HTn - not controlled.   3- Anemia of chronic disease.  4- CKD MBD      Plan:  - HD tomorrow per schedule. UF as tolerated.   - Epo with HD  - Continue with vitamin D, calcitriol and renvela.      I discussed the patients findings and my recommendations with patient and nursing staff      Jackson Welch MD  08/01/19  12:34 PM

## 2019-08-01 NOTE — DISCHARGE SUMMARY
[facility] Medicine Services  DISCHARGE SUMMARY    Patient Name: Joy Bueno  : 1950  MRN: [id number]    Date of Admission: 2019  Date of Discharge:  2019  Primary Care Physician: Goldy Dior MD    Consults     Date and Time Order Name Status Description    2019 1348 Inpatient Nephrology Consult Completed           Hospital Course     Presenting Problem:   Hyperkalemia [E87.5]  Hyperkalemia [E87.5]    Active Hospital Problems    Diagnosis  POA   • Hyperkalemia [E87.5]  Yes   • Supratherapeutic INR [R79.1]  Yes   • Generalized weakness [R53.1]  Yes      Resolved Hospital Problems   No resolved problems to display.          Hospital Course:  Joy Bueno is a 69 y.o. female here with weakness secondary to dialysis noncompliance. She underwent dialysis here and feels better. She will resume MWF dialysis.     Her INR was elevated and her dose adjusted and she needs a repeat INR in 1-2 days.     She was constipated and given an enema.      Discharge Follow Up Recommendations for labs/diagnostics:  PT/INR in 1-2 days.    Day of Discharge     HPI:   Notes constipation. No other issues.    Review of Systems    Otherwise ROS is negative except as mentioned in the HPI.    Vital Signs:   Temp:  [96.8 °F (36 °C)-98.2 °F (36.8 °C)] 97.9 °F (36.6 °C)  Heart Rate:  [66-82] 76  Resp:  [18-23] 18  BP: ()/() 131/63     Physical Exam:  NAD, alert and oriented  OP clear, MMM  Neck supple  No LAD  RRR  CTAB  +BS, ND, NT  SUTTON  NO c/c/e    Pertinent  and/or Most Recent Results     Results from last 7 days   Lab Units 19  0502 19  1131 19  0651   WBC 10*3/mm3 11.50*  --  9.13   HEMOGLOBIN g/dL 9.3*  --  10.6*   HEMOGLOBIN, POC g/dL  --  11.6*  --    HEMATOCRIT % 32.5*  --  35.0   HEMATOCRIT POC %  --  34*  --    PLATELETS 10*3/mm3 265  --  292   SODIUM mmol/L 139  --   --    POTASSIUM mmol/L 5.0  --   --    CHLORIDE mmol/L 102  --   --    CO2  mmol/L 24.0  --   --    BUN mg/dL 54*  --   --    CREATININE mg/dL 3.92* 6.10*  --    GLUCOSE mg/dL 315*  --   --    CALCIUM mg/dL 8.8  --   --      Results from last 7 days   Lab Units 08/01/19  0502 07/31/19  1130 07/30/19  0650   BILIRUBIN mg/dL 0.5  --   --    ALK PHOS U/L 81  --   --    ALT (SGPT) U/L 5  --   --    AST (SGOT) U/L 8  --   --    PROTIME Seconds 25.4* 43.1* 37.4*   INR  2.42* 3.8* 3.98*           Invalid input(s): TG, LDLCALC, LDLREALC        Brief Urine Lab Results  (Last result in the past 365 days)      Color   Clarity   Blood   Leuk Est   Nitrite   Protein   CREAT   Urine HCG        05/21/19 1539 Yellow Cloudy Small (1+) Negative Negative >=300 mg/dL (3+)         05/21/19 1539               Negative           Microbiology Results Abnormal     None          Imaging Results (all)     None          Results for orders placed during the hospital encounter of 02/11/19   Duplex Venous Upper Extremity - Left CAR    Narrative · There is evidence of deep venous thrombosis in the mid and distal left   brachial vein  · There is evidence of superficial venous thrombosis involving the left   basilic vein in the forearm.          Results for orders placed during the hospital encounter of 02/11/19   Duplex Venous Upper Extremity - Left CAR    Narrative · There is evidence of deep venous thrombosis in the mid and distal left   brachial vein  · There is evidence of superficial venous thrombosis involving the left   basilic vein in the forearm.          Results for orders placed during the hospital encounter of 09/02/17   Adult Transthoracic Echo Complete    Narrative · Left ventricular wall thickness is consistent with mild concentric   hypertrophy.  · Left atrial cavity size is mildly dilated.  · Mild mitral valve regurgitation is present  · calcification of the aortic valve  · Mild tricuspid valve regurgitation is present.            Discharge Details        Discharge Medications      Changes to Medications       Instructions Start Date   saccharomyces boulardii 250 MG capsule  Commonly known as:  FLORASTOR  What changed:  when to take this   250 mg, Oral, 2 Times Daily      warfarin 6 MG tablet  Commonly known as:  COUMADIN  What changed:    · medication strength  · how much to take  · how to take this  · when to take this  · additional instructions  · Another medication with the same name was removed. Continue taking this medication, and follow the directions you see here.   One 6 mg po daily, with follow up PT/INR in 2 days         Continue These Medications      Instructions Start Date   acetaminophen 325 MG tablet  Commonly known as:  TYLENOL   650 mg, Oral, Every 6 Hours PRN      albuterol sulfate  (90 Base) MCG/ACT inhaler  Commonly known as:  PROVENTIL HFA;VENTOLIN HFA;PROAIR HFA   2 puffs, Inhalation, Every 4 Hours PRN      atorvastatin 10 MG tablet  Commonly known as:  LIPITOR   10 mg, Oral, Nightly      calcitriol 0.25 MCG capsule  Commonly known as:  ROCALTROL   0.25 mcg, Oral, Daily      carvedilol 25 MG tablet  Commonly known as:  COREG   25 mg, Oral, 2 Times Daily With Meals      diltiaZEM (CARDIZEM) 2% cream   Topical, 3 Times Daily PRN      docusate sodium 100 MG capsule  Commonly known as:  COLACE   100 mg, Oral, 2 Times Daily      fluticasone-salmeterol 500-50 MCG/DOSE DISKUS  Commonly known as:  ADVAIR   1 puff, Inhalation, 2 Times Daily - RT      insulin aspart 100 UNIT/ML solution pen-injector sc pen  Commonly known as:  novoLOG FLEXPEN   5 Units, Subcutaneous, 3 Times Daily With Meals      ipratropium-albuterol 0.5-2.5 mg/3 ml nebulizer  Commonly known as:  DUO-NEB   3 mL, Nebulization, Every 6 Hours PRN      isosorbide mononitrate 30 MG 24 hr tablet  Commonly known as:  IMDUR   30 mg, Oral, Daily      Lidocaine 2 % gel   Apply externally, 3 Times Daily PRN      melatonin 3 MG tablet   3 mg, Oral, Nightly      ondansetron 4 MG tablet  Commonly known as:  ZOFRAN   4 mg, Oral, Every 8 Hours  PRN      pantoprazole 40 MG EC tablet  Commonly known as:  PROTONIX   40 mg, Oral, Daily      polyethylene glycol pack packet  Commonly known as:  MIRALAX   17 g, Oral, 2 Times Daily      PREPARATION H 0.25-88.44 % suppository suppository  Generic drug:  phenylephrine-cocoa Butter   Rectal, As Needed      sertraline 50 MG tablet  Commonly known as:  ZOLOFT   50 mg, Oral, Daily      sevelamer 800 MG tablet  Commonly known as:  RENVELA   800 mg, Oral, 3 Times Daily With Meals, 0800, 1200 & 1700      TAB-A-STAR tablet   1 tablet, Oral, Daily      vitamin D 26172 units capsule capsule  Commonly known as:  ERGOCALCIFEROL   50,000 Units, Oral, Weekly, TAKES ON WEDNESDAYS          Stop These Medications    dextrose 40 % gel  Commonly known as:  GLUTOSE            Allergies   Allergen Reactions   • Geodon [Ziprasidone Hcl] Unknown (See Comments)     PT DOESN'T REMEMBER   • Haldol [Haloperidol Lactate] Unknown (See Comments)     PT DOESN'T REMEMBER   • Seroquel [Quetiapine Fumarate] Unknown (See Comments)     PT DOESN'T REMEMBER         Discharge Disposition:  Skilled Nursing Facility (DC - External)    Discharge Diet:  Diet Order   Procedures   • Diet Regular; Cardiac, Consistent Carbohydrate, Renal         Discharge Activity:   Activity Instructions     Activity as Tolerated              CODE STATUS:    Code Status and Medical Interventions:   Ordered at: 07/31/19 6011     Level Of Support Discussed With:    Patient     Code Status:    CPR     Medical Interventions (Level of Support Prior to Arrest):    Full         No future appointments.    Additional Instructions for the Follow-ups that You Need to Schedule     Discharge Follow-up with PCP   As directed       Currently Documented PCP:    Goldy Dior MD    PCP Phone Number:    [phone]     Follow Up Details:  1-2 weeks               Time Spent on Discharge:  40 minutes    Electronically signed by Valentino Conway MD, 08/01/19, 11:28 AM.

## 2019-08-01 NOTE — DISCHARGE PLACEMENT REQUEST
"MALGORZATA MARS, RN    [phone]        Pam Loyd (69 y.o. Female)     Date of Birth Social Security Number Address Home Phone MRN    1950  [address] [address] [phone] [id number]    L[employer] Marital Status          None        Admission Date Admission Type Admitting Provider Attending Provider Department, Room/Bed    7/31/19 Emergency Valentino Conway MD Russell, Marc P, MD [facility] 4G, S455/1    Discharge Date Discharge Disposition Discharge Destination         Skilled Nursing Facility (DC - External)              Attending Provider:  Valentino Conway MD    Allergies:  Geodon [Ziprasidone Hcl], Haldol [Haloperidol Lactate], Seroquel [Quetiapine Fumarate]    Isolation:  None   Infection:  None   Code Status:  CPR    Ht:  162.6 cm (64\")   Wt:  138 kg (305 lb 1.6 oz)    Admission Cmt:  None   Principal Problem:  None                Active Insurance as of 7/31/2019     Primary Coverage     Payor Plan Insurance Group Employer/Plan Group    MEDICARE MEDICARE A & B      Payor Plan Address Payor Plan Phone Number Payor Plan Fax Number Effective Dates    [address] [phone]  5/1/2016 - None Entered    [facility] [id number]       Subscriber Name Subscriber Birth Date Member ID       PAM LODY 1950 [id number]           Secondary Coverage     Payor Plan Insurance Group Employer/Plan Group    [place]      Payor Plan Address Payor Plan Phone Number Payor Plan Fax Number Effective Dates    [address] [phone]  8/2/2018 - None Entered    [place] [address]       Subscriber Name Subscriber Birth Date Member ID       PAM LOYD 1950 [id number]                 Emergency Contacts      (Rel.) Home Phone Work Phone Mobile Phone    Tessie Dorado (Daughter) [phone] -- [phone]               Discharge Summary      Valentino Conway MD at 8/1/2019 11:28 AM      "         [facility] Medicine Services  DISCHARGE SUMMARY    Patient Name: Joy Bueno  : 1950  MRN: [id number]    Date of Admission: 2019  Date of Discharge:  2019  Primary Care Physician: Goldy Dior MD    Consults     Date and Time Order Name Status Description    2019 1348 Inpatient Nephrology Consult Completed           Hospital Course     Presenting Problem:   Hyperkalemia [E87.5]  Hyperkalemia [E87.5]    Active Hospital Problems    Diagnosis  POA   • Hyperkalemia [E87.5]  Yes   • Supratherapeutic INR [R79.1]  Yes   • Generalized weakness [R53.1]  Yes      Resolved Hospital Problems   No resolved problems to display.          Hospital Course:  Joy Bueno is a 69 y.o. female here with weakness secondary to dialysis noncompliance. She underwent dialysis here and feels better. She will resume MWF dialysis.     Her INR was elevated and her dose adjusted and she needs a repeat INR in 1-2 days.     She was constipated and given an enema.      Discharge Follow Up Recommendations for labs/diagnostics:  PT/INR in 1-2 days.    Day of Discharge     HPI:   Notes constipation. No other issues.    Review of Systems    Otherwise ROS is negative except as mentioned in the HPI.    Vital Signs:   Temp:  [96.8 °F (36 °C)-98.2 °F (36.8 °C)] 97.9 °F (36.6 °C)  Heart Rate:  [66-82] 76  Resp:  [18-23] 18  BP: ()/() 131/63     Physical Exam:  NAD, alert and oriented  OP clear, MMM  Neck supple  No LAD  RRR  CTAB  +BS, ND, NT  SUTTON  NO c/c/e    Pertinent  and/or Most Recent Results     Results from last 7 days   Lab Units 19  0502 19  1131 19  0651   WBC 10*3/mm3 11.50*  --  9.13   HEMOGLOBIN g/dL 9.3*  --  10.6*   HEMOGLOBIN, POC g/dL  --  11.6*  --    HEMATOCRIT % 32.5*  --  35.0   HEMATOCRIT POC %  --  34*  --    PLATELETS 10*3/mm3 265  --  292   SODIUM mmol/L 139  --   --    POTASSIUM mmol/L 5.0  --   --    CHLORIDE mmol/L 102  --   --    CO2  mmol/L 24.0  --   --    BUN mg/dL 54*  --   --    CREATININE mg/dL 3.92* 6.10*  --    GLUCOSE mg/dL 315*  --   --    CALCIUM mg/dL 8.8  --   --      Results from last 7 days   Lab Units 08/01/19  0502 07/31/19  1130 07/30/19  0650   BILIRUBIN mg/dL 0.5  --   --    ALK PHOS U/L 81  --   --    ALT (SGPT) U/L 5  --   --    AST (SGOT) U/L 8  --   --    PROTIME Seconds 25.4* 43.1* 37.4*   INR  2.42* 3.8* 3.98*           Invalid input(s): TG, LDLCALC, LDLREALC        Brief Urine Lab Results  (Last result in the past 365 days)      Color   Clarity   Blood   Leuk Est   Nitrite   Protein   CREAT   Urine HCG        05/21/19 1539 Yellow Cloudy Small (1+) Negative Negative >=300 mg/dL (3+)         05/21/19 1539               Negative           Microbiology Results Abnormal     None          Imaging Results (all)     None          Results for orders placed during the hospital encounter of 02/11/19   Duplex Venous Upper Extremity - Left CAR    Narrative · There is evidence of deep venous thrombosis in the mid and distal left   brachial vein  · There is evidence of superficial venous thrombosis involving the left   basilic vein in the forearm.          Results for orders placed during the hospital encounter of 02/11/19   Duplex Venous Upper Extremity - Left CAR    Narrative · There is evidence of deep venous thrombosis in the mid and distal left   brachial vein  · There is evidence of superficial venous thrombosis involving the left   basilic vein in the forearm.          Results for orders placed during the hospital encounter of 09/02/17   Adult Transthoracic Echo Complete    Narrative · Left ventricular wall thickness is consistent with mild concentric   hypertrophy.  · Left atrial cavity size is mildly dilated.  · Mild mitral valve regurgitation is present  · calcification of the aortic valve  · Mild tricuspid valve regurgitation is present.            Discharge Details        Discharge Medications      Changes to Medications       Instructions Start Date   saccharomyces boulardii 250 MG capsule  Commonly known as:  FLORASTOR  What changed:  when to take this   250 mg, Oral, 2 Times Daily      warfarin 6 MG tablet  Commonly known as:  COUMADIN  What changed:    · medication strength  · how much to take  · how to take this  · when to take this  · additional instructions  · Another medication with the same name was removed. Continue taking this medication, and follow the directions you see here.   One 6 mg po daily, with follow up PT/INR in 2 days         Continue These Medications      Instructions Start Date   acetaminophen 325 MG tablet  Commonly known as:  TYLENOL   650 mg, Oral, Every 6 Hours PRN      albuterol sulfate  (90 Base) MCG/ACT inhaler  Commonly known as:  PROVENTIL HFA;VENTOLIN HFA;PROAIR HFA   2 puffs, Inhalation, Every 4 Hours PRN      atorvastatin 10 MG tablet  Commonly known as:  LIPITOR   10 mg, Oral, Nightly      calcitriol 0.25 MCG capsule  Commonly known as:  ROCALTROL   0.25 mcg, Oral, Daily      carvedilol 25 MG tablet  Commonly known as:  COREG   25 mg, Oral, 2 Times Daily With Meals      diltiaZEM (CARDIZEM) 2% cream   Topical, 3 Times Daily PRN      docusate sodium 100 MG capsule  Commonly known as:  COLACE   100 mg, Oral, 2 Times Daily      fluticasone-salmeterol 500-50 MCG/DOSE DISKUS  Commonly known as:  ADVAIR   1 puff, Inhalation, 2 Times Daily - RT      insulin aspart 100 UNIT/ML solution pen-injector sc pen  Commonly known as:  novoLOG FLEXPEN   5 Units, Subcutaneous, 3 Times Daily With Meals      ipratropium-albuterol 0.5-2.5 mg/3 ml nebulizer  Commonly known as:  DUO-NEB   3 mL, Nebulization, Every 6 Hours PRN      isosorbide mononitrate 30 MG 24 hr tablet  Commonly known as:  IMDUR   30 mg, Oral, Daily      Lidocaine 2 % gel   Apply externally, 3 Times Daily PRN      melatonin 3 MG tablet   3 mg, Oral, Nightly      ondansetron 4 MG tablet  Commonly known as:  ZOFRAN   4 mg, Oral, Every 8 Hours  PRN      pantoprazole 40 MG EC tablet  Commonly known as:  PROTONIX   40 mg, Oral, Daily      polyethylene glycol pack packet  Commonly known as:  MIRALAX   17 g, Oral, 2 Times Daily      PREPARATION H 0.25-88.44 % suppository suppository  Generic drug:  phenylephrine-cocoa Butter   Rectal, As Needed      sertraline 50 MG tablet  Commonly known as:  ZOLOFT   50 mg, Oral, Daily      sevelamer 800 MG tablet  Commonly known as:  RENVELA   800 mg, Oral, 3 Times Daily With Meals, 0800, 1200 & 1700      TAB-A-STAR tablet   1 tablet, Oral, Daily      vitamin D 26623 units capsule capsule  Commonly known as:  ERGOCALCIFEROL   50,000 Units, Oral, Weekly, TAKES ON WEDNESDAYS          Stop These Medications    dextrose 40 % gel  Commonly known as:  GLUTOSE            Allergies   Allergen Reactions   • Geodon [Ziprasidone Hcl] Unknown (See Comments)     PT DOESN'T REMEMBER   • Haldol [Haloperidol Lactate] Unknown (See Comments)     PT DOESN'T REMEMBER   • Seroquel [Quetiapine Fumarate] Unknown (See Comments)     PT DOESN'T REMEMBER         Discharge Disposition:  Skilled Nursing Facility (DC - External)    Discharge Diet:  Diet Order   Procedures   • Diet Regular; Cardiac, Consistent Carbohydrate, Renal         Discharge Activity:   Activity Instructions     Activity as Tolerated              CODE STATUS:    Code Status and Medical Interventions:   Ordered at: 07/31/19 2924     Level Of Support Discussed With:    Patient     Code Status:    CPR     Medical Interventions (Level of Support Prior to Arrest):    Full         No future appointments.    Additional Instructions for the Follow-ups that You Need to Schedule     Discharge Follow-up with PCP   As directed       Currently Documented PCP:    Goldy Dior MD    PCP Phone Number:    [phone]     Follow Up Details:  1-2 weeks               Time Spent on Discharge:  40 minutes    Electronically signed by Valentino Conway MD, 08/01/19, 11:28 AM.        Electronically  signed by Valentino Conway MD at 8/1/2019 11:32 AM

## 2019-08-20 ENCOUNTER — TRANSCRIBE ORDERS (OUTPATIENT)
Dept: LAB | Facility: HOSPITAL | Age: 69
End: 2019-08-20

## 2019-08-20 ENCOUNTER — APPOINTMENT (OUTPATIENT)
Dept: LAB | Facility: HOSPITAL | Age: 69
End: 2019-08-20

## 2019-08-20 DIAGNOSIS — Z01.818 PREOP EXAMINATION: Primary | ICD-10-CM

## 2019-08-20 LAB
ALBUMIN SERPL-MCNC: 3.5 G/DL (ref 3.5–5.2)
ALBUMIN/GLOB SERPL: 0.7 G/DL
ALP SERPL-CCNC: 102 U/L (ref 39–117)
ALT SERPL W P-5'-P-CCNC: 6 U/L (ref 1–33)
ANION GAP SERPL CALCULATED.3IONS-SCNC: 13 MMOL/L (ref 5–15)
AST SERPL-CCNC: 8 U/L (ref 1–32)
BILIRUB SERPL-MCNC: 0.5 MG/DL (ref 0.2–1.2)
BUN BLD-MCNC: 44 MG/DL (ref 8–23)
BUN/CREAT SERPL: 11.9 (ref 7–25)
CALCIUM SPEC-SCNC: 9.2 MG/DL (ref 8.6–10.5)
CHLORIDE SERPL-SCNC: 94 MMOL/L (ref 98–107)
CO2 SERPL-SCNC: 26 MMOL/L (ref 22–29)
CREAT BLD-MCNC: 3.69 MG/DL (ref 0.57–1)
DEPRECATED RDW RBC AUTO: 63.7 FL (ref 37–54)
ERYTHROCYTE [DISTWIDTH] IN BLOOD BY AUTOMATED COUNT: 17.1 % (ref 12.3–15.4)
GFR SERPL CREATININE-BSD FRML MDRD: 15 ML/MIN/1.73
GLOBULIN UR ELPH-MCNC: 4.7 GM/DL
GLUCOSE BLD-MCNC: 284 MG/DL (ref 65–99)
HCT VFR BLD AUTO: 36 % (ref 34–46.6)
HGB BLD-MCNC: 10.5 G/DL (ref 12–15.9)
MCH RBC QN AUTO: 29.1 PG (ref 26.6–33)
MCHC RBC AUTO-ENTMCNC: 29.2 G/DL (ref 31.5–35.7)
MCV RBC AUTO: 99.7 FL (ref 79–97)
PLATELET # BLD AUTO: 371 10*3/MM3 (ref 140–450)
PMV BLD AUTO: 10.4 FL (ref 6–12)
POTASSIUM BLD-SCNC: 5.1 MMOL/L (ref 3.5–5.2)
PROT SERPL-MCNC: 8.2 G/DL (ref 6–8.5)
RBC # BLD AUTO: 3.61 10*6/MM3 (ref 3.77–5.28)
SODIUM BLD-SCNC: 133 MMOL/L (ref 136–145)
WBC NRBC COR # BLD: 12.26 10*3/MM3 (ref 3.4–10.8)

## 2019-08-20 PROCEDURE — 80053 COMPREHEN METABOLIC PANEL: CPT | Performed by: SURGERY

## 2019-08-20 PROCEDURE — 36415 COLL VENOUS BLD VENIPUNCTURE: CPT | Performed by: SURGERY

## 2019-08-20 PROCEDURE — 85027 COMPLETE CBC AUTOMATED: CPT | Performed by: SURGERY

## 2019-08-22 ENCOUNTER — DOCUMENTATION (OUTPATIENT)
Dept: SOCIAL WORK | Facility: HOSPITAL | Age: 69
End: 2019-08-22

## 2019-08-22 ENCOUNTER — ANESTHESIA EVENT (OUTPATIENT)
Dept: PERIOP | Facility: HOSPITAL | Age: 69
End: 2019-08-22

## 2019-08-22 ENCOUNTER — HOSPITAL ENCOUNTER (OUTPATIENT)
Facility: HOSPITAL | Age: 69
Setting detail: HOSPITAL OUTPATIENT SURGERY
Discharge: HOME | End: 2019-08-22
Attending: SURGERY | Admitting: SURGERY

## 2019-08-22 ENCOUNTER — ANESTHESIA (OUTPATIENT)
Dept: PERIOP | Facility: HOSPITAL | Age: 69
End: 2019-08-22

## 2019-08-22 VITALS
TEMPERATURE: 97.8 F | BODY MASS INDEX: 50.02 KG/M2 | WEIGHT: 293 LBS | HEIGHT: 64 IN | OXYGEN SATURATION: 92 % | SYSTOLIC BLOOD PRESSURE: 133 MMHG | HEART RATE: 69 BPM | DIASTOLIC BLOOD PRESSURE: 60 MMHG | RESPIRATION RATE: 18 BRPM

## 2019-08-22 DIAGNOSIS — L02.91 ABSCESS: ICD-10-CM

## 2019-08-22 LAB
ALBUMIN SERPL-MCNC: 3.4 G/DL (ref 3.5–5.2)
ALBUMIN/GLOB SERPL: 0.8 G/DL
ALP SERPL-CCNC: 88 U/L (ref 39–117)
ALT SERPL W P-5'-P-CCNC: <5 U/L (ref 1–33)
ANION GAP SERPL CALCULATED.3IONS-SCNC: 13 MMOL/L (ref 5–15)
AST SERPL-CCNC: 7 U/L (ref 1–32)
BILIRUB SERPL-MCNC: 0.6 MG/DL (ref 0.2–1.2)
BUN BLD-MCNC: 51 MG/DL (ref 8–23)
BUN/CREAT SERPL: 12.9 (ref 7–25)
CALCIUM SPEC-SCNC: 9.1 MG/DL (ref 8.6–10.5)
CHLORIDE SERPL-SCNC: 93 MMOL/L (ref 98–107)
CO2 SERPL-SCNC: 28 MMOL/L (ref 22–29)
CREAT BLD-MCNC: 3.95 MG/DL (ref 0.57–1)
DEPRECATED RDW RBC AUTO: 59.7 FL (ref 37–54)
ERYTHROCYTE [DISTWIDTH] IN BLOOD BY AUTOMATED COUNT: 16.8 % (ref 12.3–15.4)
GFR SERPL CREATININE-BSD FRML MDRD: 14 ML/MIN/1.73
GFR SERPL CREATININE-BSD FRML MDRD: ABNORMAL ML/MIN/{1.73_M2}
GLOBULIN UR ELPH-MCNC: 4.3 GM/DL
GLUCOSE BLD-MCNC: 315 MG/DL (ref 65–99)
GLUCOSE BLDC GLUCOMTR-MCNC: 310 MG/DL (ref 70–130)
HCT VFR BLD AUTO: 32 % (ref 34–46.6)
HGB BLD-MCNC: 9.4 G/DL (ref 12–15.9)
INR PPP: 1.24 (ref 0.85–1.16)
MCH RBC QN AUTO: 28.4 PG (ref 26.6–33)
MCHC RBC AUTO-ENTMCNC: 29.4 G/DL (ref 31.5–35.7)
MCV RBC AUTO: 96.7 FL (ref 79–97)
PLATELET # BLD AUTO: 272 10*3/MM3 (ref 140–450)
PMV BLD AUTO: 10 FL (ref 6–12)
POTASSIUM BLD-SCNC: 4.8 MMOL/L (ref 3.5–5.2)
PROT SERPL-MCNC: 7.7 G/DL (ref 6–8.5)
PROTHROMBIN TIME: 15 SECONDS (ref 11.2–14.3)
RBC # BLD AUTO: 3.31 10*6/MM3 (ref 3.77–5.28)
SODIUM BLD-SCNC: 134 MMOL/L (ref 136–145)
WBC NRBC COR # BLD: 10.85 10*3/MM3 (ref 3.4–10.8)

## 2019-08-22 PROCEDURE — 25010000002 ONDANSETRON PER 1 MG: Performed by: NURSE ANESTHETIST, CERTIFIED REGISTERED

## 2019-08-22 PROCEDURE — 87186 SC STD MICRODIL/AGAR DIL: CPT | Performed by: SURGERY

## 2019-08-22 PROCEDURE — 87205 SMEAR GRAM STAIN: CPT | Performed by: SURGERY

## 2019-08-22 PROCEDURE — 85027 COMPLETE CBC AUTOMATED: CPT | Performed by: SURGERY

## 2019-08-22 PROCEDURE — 80053 COMPREHEN METABOLIC PANEL: CPT | Performed by: SURGERY

## 2019-08-22 PROCEDURE — 25010000002 VANCOMYCIN 10 G RECONSTITUTED SOLUTION: Performed by: SURGERY

## 2019-08-22 PROCEDURE — 85610 PROTHROMBIN TIME: CPT | Performed by: ANESTHESIOLOGY

## 2019-08-22 PROCEDURE — 25010000003 LIDOCAINE 1 % SOLUTION: Performed by: NURSE ANESTHETIST, CERTIFIED REGISTERED

## 2019-08-22 PROCEDURE — 25010000002 PROPOFOL 10 MG/ML EMULSION: Performed by: NURSE ANESTHETIST, CERTIFIED REGISTERED

## 2019-08-22 PROCEDURE — 82962 GLUCOSE BLOOD TEST: CPT

## 2019-08-22 PROCEDURE — 87070 CULTURE OTHR SPECIMN AEROBIC: CPT | Performed by: SURGERY

## 2019-08-22 PROCEDURE — 25010000002 DEXAMETHASONE PER 1 MG: Performed by: NURSE ANESTHETIST, CERTIFIED REGISTERED

## 2019-08-22 PROCEDURE — 25010000002 FENTANYL CITRATE (PF) 100 MCG/2ML SOLUTION: Performed by: NURSE ANESTHETIST, CERTIFIED REGISTERED

## 2019-08-22 PROCEDURE — 87176 TISSUE HOMOGENIZATION CULTR: CPT | Performed by: SURGERY

## 2019-08-22 PROCEDURE — 87075 CULTR BACTERIA EXCEPT BLOOD: CPT | Performed by: SURGERY

## 2019-08-22 RX ORDER — ATRACURIUM BESYLATE 10 MG/ML
INJECTION, SOLUTION INTRAVENOUS AS NEEDED
Status: DISCONTINUED | OUTPATIENT
Start: 2019-08-22 | End: 2019-08-22 | Stop reason: SURG

## 2019-08-22 RX ORDER — SODIUM CHLORIDE 9 MG/ML
9 INJECTION, SOLUTION INTRAVENOUS CONTINUOUS
Status: DISCONTINUED | OUTPATIENT
Start: 2019-08-22 | End: 2019-08-22 | Stop reason: HOSPADM

## 2019-08-22 RX ORDER — DEXAMETHASONE SODIUM PHOSPHATE 4 MG/ML
INJECTION, SOLUTION INTRA-ARTICULAR; INTRALESIONAL; INTRAMUSCULAR; INTRAVENOUS; SOFT TISSUE AS NEEDED
Status: DISCONTINUED | OUTPATIENT
Start: 2019-08-22 | End: 2019-08-22 | Stop reason: SURG

## 2019-08-22 RX ORDER — PROMETHAZINE HYDROCHLORIDE 25 MG/1
25 TABLET ORAL 3 TIMES WEEKLY
Status: ON HOLD | COMMUNITY
End: 2020-07-06 | Stop reason: SDUPTHER

## 2019-08-22 RX ORDER — SODIUM CHLORIDE 0.9 % (FLUSH) 0.9 %
3-10 SYRINGE (ML) INJECTION AS NEEDED
Status: DISCONTINUED | OUTPATIENT
Start: 2019-08-22 | End: 2019-08-22 | Stop reason: HOSPADM

## 2019-08-22 RX ORDER — MEPERIDINE HYDROCHLORIDE 25 MG/ML
12.5 INJECTION INTRAMUSCULAR; INTRAVENOUS; SUBCUTANEOUS
Status: DISCONTINUED | OUTPATIENT
Start: 2019-08-22 | End: 2019-08-22 | Stop reason: HOSPADM

## 2019-08-22 RX ORDER — SODIUM CHLORIDE 0.9 % (FLUSH) 0.9 %
3 SYRINGE (ML) INJECTION EVERY 12 HOURS SCHEDULED
Status: DISCONTINUED | OUTPATIENT
Start: 2019-08-22 | End: 2019-08-22 | Stop reason: HOSPADM

## 2019-08-22 RX ORDER — ONDANSETRON 2 MG/ML
4 INJECTION INTRAMUSCULAR; INTRAVENOUS ONCE AS NEEDED
Status: DISCONTINUED | OUTPATIENT
Start: 2019-08-22 | End: 2019-08-22 | Stop reason: HOSPADM

## 2019-08-22 RX ORDER — SODIUM CHLORIDE 9 MG/ML
INJECTION, SOLUTION INTRAVENOUS CONTINUOUS PRN
Status: DISCONTINUED | OUTPATIENT
Start: 2019-08-22 | End: 2019-08-22 | Stop reason: SURG

## 2019-08-22 RX ORDER — NALOXONE HCL 0.4 MG/ML
0.4 VIAL (ML) INJECTION AS NEEDED
Status: DISCONTINUED | OUTPATIENT
Start: 2019-08-22 | End: 2019-08-22 | Stop reason: HOSPADM

## 2019-08-22 RX ORDER — FENTANYL CITRATE 50 UG/ML
INJECTION, SOLUTION INTRAMUSCULAR; INTRAVENOUS AS NEEDED
Status: DISCONTINUED | OUTPATIENT
Start: 2019-08-22 | End: 2019-08-22 | Stop reason: SURG

## 2019-08-22 RX ORDER — CLINDAMYCIN HYDROCHLORIDE 300 MG/1
300 CAPSULE ORAL EVERY 8 HOURS
COMMUNITY
End: 2019-09-01

## 2019-08-22 RX ORDER — IPRATROPIUM BROMIDE AND ALBUTEROL SULFATE 2.5; .5 MG/3ML; MG/3ML
3 SOLUTION RESPIRATORY (INHALATION) ONCE AS NEEDED
Status: DISCONTINUED | OUTPATIENT
Start: 2019-08-22 | End: 2019-08-22 | Stop reason: HOSPADM

## 2019-08-22 RX ORDER — LABETALOL HYDROCHLORIDE 5 MG/ML
5 INJECTION, SOLUTION INTRAVENOUS
Status: DISCONTINUED | OUTPATIENT
Start: 2019-08-22 | End: 2019-08-22 | Stop reason: HOSPADM

## 2019-08-22 RX ORDER — HYDROCODONE BITARTRATE AND ACETAMINOPHEN 5; 325 MG/1; MG/1
1 TABLET ORAL EVERY 8 HOURS PRN
Qty: 15 TABLET | Refills: 0 | Status: ON HOLD | OUTPATIENT
Start: 2019-08-22 | End: 2019-10-02 | Stop reason: SDUPTHER

## 2019-08-22 RX ORDER — HYDROCODONE BITARTRATE AND ACETAMINOPHEN 5; 325 MG/1; MG/1
1 TABLET ORAL ONCE AS NEEDED
Status: COMPLETED | OUTPATIENT
Start: 2019-08-22 | End: 2019-08-22

## 2019-08-22 RX ORDER — FAMOTIDINE 10 MG/ML
20 INJECTION, SOLUTION INTRAVENOUS ONCE
Status: COMPLETED | OUTPATIENT
Start: 2019-08-22 | End: 2019-08-22

## 2019-08-22 RX ORDER — BUPIVACAINE HYDROCHLORIDE AND EPINEPHRINE 5; 5 MG/ML; UG/ML
INJECTION, SOLUTION PERINEURAL AS NEEDED
Status: DISCONTINUED | OUTPATIENT
Start: 2019-08-22 | End: 2019-08-22 | Stop reason: HOSPADM

## 2019-08-22 RX ORDER — ONDANSETRON 2 MG/ML
INJECTION INTRAMUSCULAR; INTRAVENOUS AS NEEDED
Status: DISCONTINUED | OUTPATIENT
Start: 2019-08-22 | End: 2019-08-22 | Stop reason: SURG

## 2019-08-22 RX ORDER — FENTANYL CITRATE 50 UG/ML
50 INJECTION, SOLUTION INTRAMUSCULAR; INTRAVENOUS
Status: DISCONTINUED | OUTPATIENT
Start: 2019-08-22 | End: 2019-08-22 | Stop reason: HOSPADM

## 2019-08-22 RX ORDER — MAGNESIUM HYDROXIDE 1200 MG/15ML
LIQUID ORAL AS NEEDED
Status: DISCONTINUED | OUTPATIENT
Start: 2019-08-22 | End: 2019-08-22 | Stop reason: HOSPADM

## 2019-08-22 RX ORDER — SODIUM CHLORIDE, SODIUM LACTATE, POTASSIUM CHLORIDE, CALCIUM CHLORIDE 600; 310; 30; 20 MG/100ML; MG/100ML; MG/100ML; MG/100ML
9 INJECTION, SOLUTION INTRAVENOUS CONTINUOUS
Status: DISCONTINUED | OUTPATIENT
Start: 2019-08-22 | End: 2019-08-22

## 2019-08-22 RX ORDER — PROPOFOL 10 MG/ML
VIAL (ML) INTRAVENOUS AS NEEDED
Status: DISCONTINUED | OUTPATIENT
Start: 2019-08-22 | End: 2019-08-22 | Stop reason: SURG

## 2019-08-22 RX ORDER — LIDOCAINE HYDROCHLORIDE 10 MG/ML
INJECTION, SOLUTION INFILTRATION; PERINEURAL AS NEEDED
Status: DISCONTINUED | OUTPATIENT
Start: 2019-08-22 | End: 2019-08-22 | Stop reason: SURG

## 2019-08-22 RX ORDER — LIDOCAINE HYDROCHLORIDE 10 MG/ML
0.5 INJECTION, SOLUTION EPIDURAL; INFILTRATION; INTRACAUDAL; PERINEURAL ONCE AS NEEDED
Status: COMPLETED | OUTPATIENT
Start: 2019-08-22 | End: 2019-08-22

## 2019-08-22 RX ORDER — SODIUM CHLORIDE 0.9 % (FLUSH) 0.9 %
1-10 SYRINGE (ML) INJECTION AS NEEDED
Status: DISCONTINUED | OUTPATIENT
Start: 2019-08-22 | End: 2019-08-22 | Stop reason: HOSPADM

## 2019-08-22 RX ORDER — HYDRALAZINE HYDROCHLORIDE 20 MG/ML
5 INJECTION INTRAMUSCULAR; INTRAVENOUS
Status: DISCONTINUED | OUTPATIENT
Start: 2019-08-22 | End: 2019-08-22 | Stop reason: HOSPADM

## 2019-08-22 RX ADMIN — FAMOTIDINE 20 MG: 10 INJECTION, SOLUTION INTRAVENOUS at 11:47

## 2019-08-22 RX ADMIN — HYDROCODONE BITARTRATE AND ACETAMINOPHEN 1 TABLET: 5; 325 TABLET ORAL at 14:40

## 2019-08-22 RX ADMIN — PROPOFOL 150 MG: 10 INJECTION, EMULSION INTRAVENOUS at 13:19

## 2019-08-22 RX ADMIN — SODIUM CHLORIDE: 9 INJECTION, SOLUTION INTRAVENOUS at 13:13

## 2019-08-22 RX ADMIN — LIDOCAINE HYDROCHLORIDE 0.3 ML: 10 INJECTION, SOLUTION EPIDURAL; INFILTRATION; INTRACAUDAL; PERINEURAL at 11:47

## 2019-08-22 RX ADMIN — ATRACURIUM BESYLATE 40 MG: 10 INJECTION, SOLUTION INTRAVENOUS at 13:19

## 2019-08-22 RX ADMIN — LIDOCAINE HYDROCHLORIDE 50 MG: 10 INJECTION, SOLUTION INFILTRATION; PERINEURAL at 13:19

## 2019-08-22 RX ADMIN — DEXAMETHASONE SODIUM PHOSPHATE 4 MG: 4 INJECTION, SOLUTION INTRAMUSCULAR; INTRAVENOUS at 13:45

## 2019-08-22 RX ADMIN — SODIUM CHLORIDE 9 ML/HR: 9 INJECTION, SOLUTION INTRAVENOUS at 11:52

## 2019-08-22 RX ADMIN — VANCOMYCIN HYDROCHLORIDE 2000 MG: 10 INJECTION, POWDER, LYOPHILIZED, FOR SOLUTION INTRAVENOUS at 12:23

## 2019-08-22 RX ADMIN — ONDANSETRON 4 MG: 2 INJECTION INTRAMUSCULAR; INTRAVENOUS at 13:45

## 2019-08-22 RX ADMIN — FENTANYL CITRATE 50 MCG: 50 INJECTION, SOLUTION INTRAMUSCULAR; INTRAVENOUS at 13:19

## 2019-08-22 NOTE — ANESTHESIA PROCEDURE NOTES
Airway  Urgency: elective    Airway not difficult    General Information and Staff    Patient location during procedure: OR  CRNA: Monique Ferrera CRNA    Indications and Patient Condition  Indications for airway management: airway protection    Preoxygenated: yes  MILS not maintained throughout  Mask difficulty assessment: 1 - vent by mask    Final Airway Details  Final airway type: endotracheal airway      Successful airway: ETT  Cuffed: yes   Successful intubation technique: direct laryngoscopy  Facilitating devices/methods: intubating stylet  Endotracheal tube insertion site: oral  Blade: Villegas  Blade size: 2  ETT size (mm): 7.0  Cormack-Lehane Classification: grade I - full view of glottis  Placement verified by: chest auscultation and capnometry   Measured from: lips  ETT to lips (cm): 20  Number of attempts at approach: 1    Additional Comments  Negative epigastric sounds, Breath sound equal bilaterally with symmetric chest rise and fall.  tEETH INTACT, ATRAUMATIC

## 2019-08-22 NOTE — ANESTHESIA POSTPROCEDURE EVALUATION
Patient: Joy Bueno    Procedure Summary     Date:  08/22/19 Room / Location:   [facility] / [facility]    Anesthesia Start:  1313 Anesthesia Stop:  1417    Procedure:  ABSCESS DRAINAGE X2 ON BACK (N/A Back) Diagnosis:      Surgeon:  Carlos Enrique Calderón MD Provider:  Michael Drummond MD    Anesthesia Type:  MAC ASA Status:  4          Anesthesia Type: MAC  Last vitals  BP   139/60 (08/22/19 1417)   Temp   97 °F (36.1 °C) (08/22/19 1417)   Pulse   76 (08/22/19 1417)   Resp   16 (08/22/19 1417)     SpO2   100 % (08/22/19 1417)     Post Anesthesia Care and Evaluation    Patient location during evaluation: PACU  Patient participation: complete - patient participated  Level of consciousness: awake and alert  Pain score: 0  Pain management: adequate  Airway patency: patent  Anesthetic complications: No anesthetic complications  PONV Status: none  Cardiovascular status: hemodynamically stable and acceptable  Respiratory status: nonlabored ventilation, acceptable, nasal airway, airway suctioned, spontaneous ventilation and face mask  Hydration status: acceptable    Comments: AAOx3, VSS on nonrebreather with good respiratory effort, cooperative coughing and deep breathing

## 2019-08-22 NOTE — H&P
Pre-Op H&P  Joy Bueno  [id number]  1950    Chief complaint: Abscess of back x 2    HPI:    Patient is a 69 y.o.female resident of nursing home with history of CKD on HD (m,w,f), DM2, FABRICE, DHF and DVT in who has developed and abscess x 2 on her back.  Here today to undergo drainage of back x 2 abscesses.    Review of Systems:  General ROS: negative for chills, fever or skin lesions;  No changes since last office visit  Cardiovascular ROS: no chest pain or dyspnea on exertion  Respiratory ROS: no cough, shortness of breath, or wheezing    Allergies:   Allergies   Allergen Reactions   • Geodon [Ziprasidone Hcl] Unknown (See Comments)     PT DOESN'T REMEMBER   • Haldol [Haloperidol Lactate] Unknown (See Comments)     PT DOESN'T REMEMBER   • Seroquel [Quetiapine Fumarate] Unknown (See Comments)     PT DOESN'T REMEMBER       Home Meds:    No current facility-administered medications on file prior to encounter.      Current Outpatient Medications on File Prior to Encounter   Medication Sig Dispense Refill   • acetaminophen (TYLENOL) 325 MG tablet Take 650 mg by mouth Every 6 (Six) Hours As Needed for Mild Pain .     • atorvastatin (LIPITOR) 10 MG tablet Take 10 mg by mouth Every Night.     • calcitriol (ROCALTROL) 0.25 MCG capsule Take 0.25 mcg by mouth Daily.     • carvedilol (COREG) 25 MG tablet Take 25 mg by mouth 2 (Two) Times a Day With Meals.     • clindamycin (CLEOCIN) 300 MG capsule Take 300 mg by mouth Every 8 (Eight) Hours. For 10 days     • docusate sodium (COLACE) 100 MG capsule Take 1 capsule by mouth 2 (Two) Times a Day.     • fluticasone-salmeterol (ADVAIR) 500-50 MCG/DOSE DISKUS Inhale 1 puff 2 (Two) Times a Day.     • isosorbide mononitrate (IMDUR) 30 MG 24 hr tablet Take 30 mg by mouth Daily.     • melatonin 3 MG tablet Take 3 mg by mouth Every Night.     • pantoprazole (PROTONIX) 40 MG EC tablet Take 40 mg by mouth Daily.     • polyethylene glycol (MIRALAX) pack packet Take 17 g by mouth 2  (Two) Times a Day.     • promethazine (PHENERGAN) 25 MG tablet Take 25 mg by mouth 3 (Three) Times a Week. On dialysis days (Mon Weds Fri)     • saccharomyces boulardii (FLORASTOR) 250 MG capsule Take 1 capsule by mouth 2 (Two) Times a Day. (Patient taking differently: Take 250 mg by mouth Daily.)     • sertraline (ZOLOFT) 50 MG tablet Take 50 mg by mouth Daily.     • sevelamer (RENVELA) 800 MG tablet Take 800 mg by mouth 3 (Three) Times a Day With Meals. 0800, 1200 & 1700     • albuterol (PROVENTIL HFA;VENTOLIN HFA) 108 (90 BASE) MCG/ACT inhaler Inhale 2 puffs Every 4 (Four) Hours As Needed for Wheezing.     • insulin aspart (novoLOG FLEXPEN) 100 UNIT/ML solution pen-injector sc pen Inject 5 Units under the skin into the appropriate area as directed 3 (Three) Times a Day With Meals.     • ipratropium-albuterol (DUO-NEB) 0.5-2.5 mg/3 ml nebulizer Take 3 mL by nebulization Every 6 (Six) Hours As Needed for Wheezing.     • Multiple Vitamin (TAB-A-STAR) tablet Take 1 tablet by mouth Daily.     • ondansetron (ZOFRAN) 4 MG tablet Take 4 mg by mouth Every 8 (Eight) Hours As Needed for Nausea or Vomiting.     • vitamin D (ERGOCALCIFEROL) 94735 units capsule capsule Take 50,000 Units by mouth 1 (One) Time Per Week. TAKES ON WEDNESDAYS         PMH:   Past Medical History:   Diagnosis Date   • Acute on chronic diastolic heart failure (CMS/HCC)    • Acute on chronic heart failure (CMS/HCC)    • Anemia    • Anxiety    • Asthma    • Chronic diastolic heart failure (CMS/HCC)    • Chronic kidney disease    • Diabetes mellitus (CMS/HCC)    • Diabetes mellitus, type II (CMS/HCC) 8/3/2018   • History of Clostridium difficile infection 8/3/2018   • History of respiratory failure, since off home oxygen 8/3/2018   • History of UTI 8/3/2018   • Hypertension    • Morbid obesity (CMS/HCC)    • Osteoarthritis    • Sleep apnea    • Tinea capitis 8/3/2018     PSH:    Past Surgical History:   Procedure Laterality Date   • ARTERIOVENOUS  "FISTULA/SHUNT SURGERY Left 10/22/2018    Procedure: LEFT UPPER EXTREMITY ARTERIOVENOUS FISTULA CREATION;  Surgeon: Carlos Enrique Calderón MD;  Location:  [facility];  Service: Vascular   •  SECTION     • COLONOSCOPY N/A 2017    Procedure: COLONOSCOPY;  Surgeon: Mark I Brunner, MD;  Location:  [facility];  Service:    • ENDOSCOPY N/A 2017    Procedure: ESOPHAGOGASTRODUODENOSCOPY ;  Surgeon: Velasquez Call MD;  Location:  [facility];  Service:    • HERNIA REPAIR     • INSERTION HEMODIALYSIS CATHETER Right 10/17/2018    Procedure: HEMODIALYSIS CATHETER INSERTION;  Surgeon: Carlos Enrique Calderón MD;  Location:  [facility];  Service: General     Social History:   Tobacco:   Social History     Tobacco Use   Smoking Status Former Smoker   • Packs/day: 0.25   Smokeless Tobacco Never Used   Tobacco Comment    unknown when quit, maybe last year      Alcohol:     Social History     Substance and Sexual Activity   Alcohol Use No       Vitals:           /78 (BP Location: Right arm, Patient Position: Lying)   Pulse 71   Temp 98 °F (36.7 °C) (Temporal)   Resp 20   Ht 162.6 cm (64\")   Wt (!) 138 kg (305 lb)   SpO2 98%   BMI 52.35 kg/m²     Physical Exam:  General Appearance:    Alert, cooperative, no distress, appears stated age   Head:    Normocephalic, without obvious abnormality, atraumatic   Lungs:     Clear to auscultation bilaterally, respirations unlabored    Heart:   Regular rate and rhythm, S1 and S2 normal, no murmur, rub    or gallop.  Right tayla cath    Abdomen:    Soft, non-tender.  +bowel sounds   Breast Exam:    deferred   Genitalia:    deferred   Extremities:   Extremities normal, atraumatic, no cyanosis or edema   Skin:   Skin color, texture, turgor normal, no rashes or +back abscess x 2 with dressing intact   Neurologic:   Grossly intact   Results Review  LABS:  Lab Results   Component Value Date    WBC 10.85 (H) 2019    HGB 9.4 (L) 2019    HCT 32.0 (L) " 08/22/2019    MCV 96.7 08/22/2019     08/22/2019    NEUTROABS 6.35 07/30/2019    GLUCOSE 284 (H) 08/20/2019    BUN 44 (H) 08/20/2019    CREATININE 3.69 (H) 08/20/2019    EGFRIFNONA  08/01/2019      Comment:      <15 Indicative of kidney failure.    EGFRIFAFRI 15 (L) 08/20/2019     (L) 08/20/2019    K 5.1 08/20/2019    CL 94 (L) 08/20/2019    CO2 26.0 08/20/2019    MG 1.6 02/11/2019    PHOS 4.8 02/20/2019    CALCIUM 9.2 08/20/2019    ALBUMIN 3.50 08/20/2019    AST 8 08/20/2019    ALT 6 08/20/2019    BILITOT 0.5 08/20/2019       RADIOLOGY:  Imaging Results (last 72 hours)     ** No results found for the last 72 hours. **          I reviewed the patient's new clinical results.  INR/K pending    Cancer Staging (if applicable)  Cancer Patient: __ yes _x_no __unknown; If yes, clinical stage T:__ N:__M:__, stage group or __N/A    Impression: Back abscess x 2    Plan: Drainage of back abscess x 2    Ashanti Ospina, APRN   8/22/2019   12:24 PM     I have reviewed the above note, my prior note(s), appropriate imaging, and labs.  I have again discussed the risks and benefits of abscess drainage on the back with the patient.  All of their questions have been answered.  They understand and wish to proceed with surgical intervention.    CBC  Results from last 7 days   Lab Units 08/22/19  1144   WBC 10*3/mm3 10.85*   HEMOGLOBIN g/dL 9.4*   HEMATOCRIT % 32.0*   PLATELETS 10*3/mm3 272       CMP  Results from last 7 days   Lab Units 08/20/19  1642   SODIUM mmol/L 133*   POTASSIUM mmol/L 5.1   CHLORIDE mmol/L 94*   CO2 mmol/L 26.0   BUN mg/dL 44*   CREATININE mg/dL 3.69*   CALCIUM mg/dL 9.2   BILIRUBIN mg/dL 0.5   ALK PHOS U/L 102   ALT (SGPT) U/L 6   AST (SGOT) U/L 8   GLUCOSE mg/dL 284*       Coagulation Cascade  PTT   Date Value Ref Range Status   04/05/2019 39.4 (H) 24.0 - 37.0 seconds Final     INR   Date Value Ref Range Status   08/01/2019 2.42 (H) 0.85 - 1.16 Final     Protime   Date Value Ref Range Status    08/01/2019 25.4 (H) 11.2 - 14.3 Seconds Final       No components found for: ECHO     Results for orders placed during the hospital encounter of 09/02/17   Adult Transthoracic Echo Complete    Narrative · Left ventricular wall thickness is consistent with mild concentric   hypertrophy.  · Left atrial cavity size is mildly dilated.  · Mild mitral valve regurgitation is present  · calcification of the aortic valve  · Mild tricuspid valve regurgitation is present.

## 2019-08-22 NOTE — OP NOTE
General Surgery Operative Note    Joy Bueno  [id number]  1950    Date of Surgery:  8/22/2019 2:05 PM    Pre-op Diagnosis: Back abscess x2    Post-op Diagnosis: Back abscess x2    Procedure: Incision and drainage back abscess x2    Surgeon: Carlos Enrique Calderón MD    Assistant: None    Anesthesia: Monitored Anesthesia Care    Fluids: 500 mL of crystalloid    Estimated Blood Loss: Less than 25 mL    Urine Voided: Not recorded    Specimens: Tissue and fluid for culture and sensitivity aerobic and anaerobic bacteria    Findings: 2 separate back abscesses    Complications: None apparent    History:   69-year-old morbidly obese lady has 2 open draining areas on her back.  One on the upper right and the second on the mid left.  She requires incision and drainage.  Her white blood cell count today was 10.8.  INR 1.24.     The risks and benefits of incision and drainage with postoperative local wound care were rehashed.  Our discussion included but was not limited to: bleeding, infection, injury to adjacent viscera, need for open dressing changes postoperatively, and medical issues from a cardiopulmonary and deep venous thrombosis standpoint.  All questions were answered and they understand and wish to proceed with surgical intervention.    Procedure:      After informed consent, the patient was taken to the operating room and placed in the supine position.  General anesthesia was induced, she was then placed in the right lateral decubitus position, the back was then prepped and draped in the standard sterile fashion. A timeout was observed.     I began with the abscess on the left mid back.  A fusiform incision was created excising the skin directly over the abscess.  The abscess cavity was drained and cultures taken.  The wound was copiously irrigated.  Half percent Marcaine with epinephrine was infiltrated in the tissue around the abscess cavity.  This was then packed with a Betadine soaked gauze.      The  second abscess on the right upper back was then opened in a similar fashion with a fusiform incision.  Again cultures were taken.  The wound was copiously irrigated and meticulous hemostasis obtained.  This was then packed with Betadine soaked gauze.         Dressings were placed on the wounds.  All lap and needle counts were correct at the end of the procedure ×2.  The patient was then transferred to the PACU in stable condition.    Carlos Enrique Calderón MD     Date: 8/22/2019  Time: 2:05 PM

## 2019-08-22 NOTE — PROGRESS NOTES
Continued Stay Note       Patient Name: Joy Bueno  MRN: [id number]  Today's Date: 8/22/2019    Admit Date: (Not on file)    Discharge Plan     Row Name 08/22/19 1522       Plan    Plan  Social work called [employer] and they transported the patient to the hospital today from [facility]. [employer] will provide transportation back to [facility] today.  The lift team will assist in this today so that [employer] can provide transportation back to the nursing facility [facility] today.  The patients caregiver Landon will call [employer] today    Patient/Family in Agreement with Plan  yes    Row Name 08/22/19 4690       Plan    Plan Comments  Patient is from [facility] and transported here by [employer] with her caregiver, Laura. Spoke with [facility] to confirm this information. ROGELIO Manuel, consulted to call [employer] to confirm arrangements for return to [facility] today.     Row Name 08/22/19 0007       Plan    Plan  Social work called [employer] at [phone] and they have verified that the patient came to the hospital with [employer].        Discharge Codes    No documentation.             ROGELIO Monsivais

## 2019-08-22 NOTE — ANESTHESIA PREPROCEDURE EVALUATION
Anesthesia Evaluation     NPO Solid Status: > 8 hours  NPO Liquid Status: > 6 hours           Airway   Mallampati: III  TM distance: >3 FB  Difficult intubation highly probable  Dental    (+) poor dentition        Pulmonary    (+) a smoker Former, asthma (lungs good today, rarely uses inhaler), decreased breath sounds,   Cardiovascular     Rhythm: regular  Rate: normal    (+) hypertension,   (-) pacemaker, angina, murmur, cardiac stents      Neuro/Psych  (-) seizures, TIA  GI/Hepatic/Renal/Endo    (+) obesity, morbid obesity,  renal disease (done yesterday 8/21) dialysis,   (-) liver disease    Musculoskeletal     Abdominal    Substance History      OB/GYN          Other                      Anesthesia Plan    ASA 4     MAC     intravenous induction     Plan discussed with CRNA.

## 2019-08-24 LAB
BACTERIA SPEC AEROBE CULT: ABNORMAL
BACTERIA SPEC AEROBE CULT: ABNORMAL
GRAM STN SPEC: ABNORMAL

## 2019-09-01 ENCOUNTER — APPOINTMENT (OUTPATIENT)
Dept: GENERAL RADIOLOGY | Facility: HOSPITAL | Age: 69
End: 2019-09-01

## 2019-09-01 ENCOUNTER — HOSPITAL ENCOUNTER (INPATIENT)
Facility: HOSPITAL | Age: 69
LOS: 2 days | Discharge: NURSING, REHAB OR LONG-TERM CARE | End: 2019-09-03
Attending: EMERGENCY MEDICINE | Admitting: INTERNAL MEDICINE

## 2019-09-01 DIAGNOSIS — R07.9 CHEST PAIN, UNSPECIFIED TYPE: ICD-10-CM

## 2019-09-01 DIAGNOSIS — N18.6 CHRONIC KIDNEY DISEASE WITH END STAGE RENAL FAILURE ON DIALYSIS (HCC): ICD-10-CM

## 2019-09-01 DIAGNOSIS — R77.8 ELEVATED TROPONIN: ICD-10-CM

## 2019-09-01 DIAGNOSIS — N39.0 URINARY TRACT INFECTION WITHOUT HEMATURIA, SITE UNSPECIFIED: Primary | ICD-10-CM

## 2019-09-01 DIAGNOSIS — Z99.2 CHRONIC KIDNEY DISEASE WITH END STAGE RENAL FAILURE ON DIALYSIS (HCC): ICD-10-CM

## 2019-09-01 PROBLEM — L02.212 ABSCESS OF BACK: Status: ACTIVE | Noted: 2019-02-20

## 2019-09-01 PROBLEM — A49.9 UTI (URINARY TRACT INFECTION), BACTERIAL: Status: ACTIVE | Noted: 2017-10-04

## 2019-09-01 LAB
ALBUMIN SERPL-MCNC: 3.6 G/DL (ref 3.5–5.2)
ALBUMIN/GLOB SERPL: 0.8 G/DL
ALP SERPL-CCNC: 113 U/L (ref 39–117)
ALT SERPL W P-5'-P-CCNC: 6 U/L (ref 1–33)
ANION GAP SERPL CALCULATED.3IONS-SCNC: 13 MMOL/L (ref 5–15)
ARTERIAL PATENCY WRIST A: ABNORMAL
AST SERPL-CCNC: 11 U/L (ref 1–32)
ATMOSPHERIC PRESS: ABNORMAL MM[HG]
BACTERIA SPEC ANAEROBE CULT: NORMAL
BACTERIA SPEC ANAEROBE CULT: NORMAL
BACTERIA UR QL AUTO: ABNORMAL /HPF
BASE EXCESS BLDA CALC-SCNC: 0.3 MMOL/L (ref 0–2)
BASOPHILS # BLD AUTO: 0.03 10*3/MM3 (ref 0–0.2)
BASOPHILS NFR BLD AUTO: 0.4 % (ref 0–1.5)
BDY SITE: ABNORMAL
BILIRUB SERPL-MCNC: 0.4 MG/DL (ref 0.2–1.2)
BILIRUB UR QL STRIP: NEGATIVE
BODY TEMPERATURE: 98.6 C
BUN BLD-MCNC: 42 MG/DL (ref 8–23)
BUN/CREAT SERPL: 9.5 (ref 7–25)
CALCIUM SPEC-SCNC: 9.3 MG/DL (ref 8.6–10.5)
CHLORIDE SERPL-SCNC: 95 MMOL/L (ref 98–107)
CLARITY UR: ABNORMAL
CO2 BLDA-SCNC: 27.7 MMOL/L (ref 23–27)
CO2 SERPL-SCNC: 26 MMOL/L (ref 22–29)
COARSE GRAN CASTS URNS QL MICRO: ABNORMAL /LPF
COHGB MFR BLD: 1.5 % (ref 0–2)
COLOR UR: YELLOW
CREAT BLD-MCNC: 4.41 MG/DL (ref 0.57–1)
DEPRECATED RDW RBC AUTO: 58.5 FL (ref 37–54)
EOSINOPHIL # BLD AUTO: 0.28 10*3/MM3 (ref 0–0.4)
EOSINOPHIL NFR BLD AUTO: 3.6 % (ref 0.3–6.2)
ERYTHROCYTE [DISTWIDTH] IN BLOOD BY AUTOMATED COUNT: 16.7 % (ref 12.3–15.4)
GFR SERPL CREATININE-BSD FRML MDRD: 12 ML/MIN/1.73
GFR SERPL CREATININE-BSD FRML MDRD: ABNORMAL ML/MIN/{1.73_M2}
GLOBULIN UR ELPH-MCNC: 4.4 GM/DL
GLUCOSE BLD-MCNC: 348 MG/DL (ref 65–99)
GLUCOSE BLDC GLUCOMTR-MCNC: 216 MG/DL (ref 70–130)
GLUCOSE BLDC GLUCOMTR-MCNC: 235 MG/DL (ref 70–130)
GLUCOSE UR STRIP-MCNC: ABNORMAL MG/DL
HCO3 BLDA-SCNC: 26.2 MMOL/L (ref 20–26)
HCT VFR BLD AUTO: 35 % (ref 34–46.6)
HCT VFR BLD CALC: 30.7 %
HGB BLD-MCNC: 10.3 G/DL (ref 12–15.9)
HGB BLDA-MCNC: 10 G/DL (ref 14–18)
HGB UR QL STRIP.AUTO: ABNORMAL
HOLD SPECIMEN: NORMAL
HOLD SPECIMEN: NORMAL
HOROWITZ INDEX BLD+IHG-RTO: 21 %
HYALINE CASTS UR QL AUTO: ABNORMAL /LPF
IMM GRANULOCYTES # BLD AUTO: 0.07 10*3/MM3 (ref 0–0.05)
IMM GRANULOCYTES NFR BLD AUTO: 0.9 % (ref 0–0.5)
INR PPP: 1 (ref 0.85–1.16)
KETONES UR QL STRIP: NEGATIVE
LEUKOCYTE ESTERASE UR QL STRIP.AUTO: ABNORMAL
LIPASE SERPL-CCNC: 66 U/L (ref 13–60)
LYMPHOCYTES # BLD AUTO: 1.39 10*3/MM3 (ref 0.7–3.1)
LYMPHOCYTES NFR BLD AUTO: 17.6 % (ref 19.6–45.3)
MCH RBC QN AUTO: 28.2 PG (ref 26.6–33)
MCHC RBC AUTO-ENTMCNC: 29.4 G/DL (ref 31.5–35.7)
MCV RBC AUTO: 95.9 FL (ref 79–97)
METHGB BLD QL: 1.2 % (ref 0–1.5)
MODALITY: ABNORMAL
MONOCYTES # BLD AUTO: 0.46 10*3/MM3 (ref 0.1–0.9)
MONOCYTES NFR BLD AUTO: 5.8 % (ref 5–12)
NEUTROPHILS # BLD AUTO: 5.65 10*3/MM3 (ref 1.7–7)
NEUTROPHILS NFR BLD AUTO: 71.7 % (ref 42.7–76)
NITRITE UR QL STRIP: NEGATIVE
NOTE: ABNORMAL
NRBC BLD AUTO-RTO: 0 /100 WBC (ref 0–0.2)
NT-PROBNP SERPL-MCNC: 1436 PG/ML (ref 5–900)
OXYHGB MFR BLDV: 92.4 % (ref 94–99)
PCO2 BLDA: 47.1 MM HG (ref 35–45)
PCO2 TEMP ADJ BLD: 47.1 MM HG (ref 35–45)
PH BLDA: 7.35 PH UNITS (ref 7.35–7.45)
PH UR STRIP.AUTO: 6 [PH] (ref 5–8)
PH, TEMP CORRECTED: 7.35 PH UNITS
PLATELET # BLD AUTO: 245 10*3/MM3 (ref 140–450)
PMV BLD AUTO: 10.3 FL (ref 6–12)
PO2 BLDA: 74.2 MM HG (ref 83–108)
PO2 TEMP ADJ BLD: 74.2 MM HG (ref 83–108)
POTASSIUM BLD-SCNC: 4.9 MMOL/L (ref 3.5–5.2)
PROT SERPL-MCNC: 8 G/DL (ref 6–8.5)
PROT UR QL STRIP: ABNORMAL
PROTHROMBIN TIME: 12.7 SECONDS (ref 11.2–14.3)
RBC # BLD AUTO: 3.65 10*6/MM3 (ref 3.77–5.28)
RBC # UR: ABNORMAL /HPF
REF LAB TEST METHOD: ABNORMAL
SODIUM BLD-SCNC: 134 MMOL/L (ref 136–145)
SP GR UR STRIP: 1.01 (ref 1–1.03)
SQUAMOUS #/AREA URNS HPF: ABNORMAL /HPF
TROPONIN T SERPL-MCNC: 0.14 NG/ML (ref 0–0.03)
TROPONIN T SERPL-MCNC: 0.17 NG/ML (ref 0–0.03)
UROBILINOGEN UR QL STRIP: ABNORMAL
VENTILATOR MODE: ABNORMAL
WBC NRBC COR # BLD: 7.88 10*3/MM3 (ref 3.4–10.8)
WBC UR QL AUTO: ABNORMAL /HPF
WHOLE BLOOD HOLD SPECIMEN: NORMAL
WHOLE BLOOD HOLD SPECIMEN: NORMAL

## 2019-09-01 PROCEDURE — 81001 URINALYSIS AUTO W/SCOPE: CPT | Performed by: PHYSICIAN ASSISTANT

## 2019-09-01 PROCEDURE — 87086 URINE CULTURE/COLONY COUNT: CPT | Performed by: PHYSICIAN ASSISTANT

## 2019-09-01 PROCEDURE — 71045 X-RAY EXAM CHEST 1 VIEW: CPT

## 2019-09-01 PROCEDURE — 83690 ASSAY OF LIPASE: CPT | Performed by: EMERGENCY MEDICINE

## 2019-09-01 PROCEDURE — P9612 CATHETERIZE FOR URINE SPEC: HCPCS

## 2019-09-01 PROCEDURE — 87186 SC STD MICRODIL/AGAR DIL: CPT | Performed by: PHYSICIAN ASSISTANT

## 2019-09-01 PROCEDURE — 94640 AIRWAY INHALATION TREATMENT: CPT

## 2019-09-01 PROCEDURE — 80053 COMPREHEN METABOLIC PANEL: CPT | Performed by: EMERGENCY MEDICINE

## 2019-09-01 PROCEDURE — 84484 ASSAY OF TROPONIN QUANT: CPT | Performed by: EMERGENCY MEDICINE

## 2019-09-01 PROCEDURE — 99223 1ST HOSP IP/OBS HIGH 75: CPT | Performed by: INTERNAL MEDICINE

## 2019-09-01 PROCEDURE — 93005 ELECTROCARDIOGRAM TRACING: CPT | Performed by: EMERGENCY MEDICINE

## 2019-09-01 PROCEDURE — 25010000002 ONDANSETRON PER 1 MG: Performed by: PHYSICIAN ASSISTANT

## 2019-09-01 PROCEDURE — 85025 COMPLETE CBC W/AUTO DIFF WBC: CPT | Performed by: EMERGENCY MEDICINE

## 2019-09-01 PROCEDURE — 83880 ASSAY OF NATRIURETIC PEPTIDE: CPT | Performed by: EMERGENCY MEDICINE

## 2019-09-01 PROCEDURE — 25010000002 CEFTRIAXONE PER 250 MG: Performed by: PHYSICIAN ASSISTANT

## 2019-09-01 PROCEDURE — 87088 URINE BACTERIA CULTURE: CPT | Performed by: PHYSICIAN ASSISTANT

## 2019-09-01 PROCEDURE — 36600 WITHDRAWAL OF ARTERIAL BLOOD: CPT

## 2019-09-01 PROCEDURE — 85610 PROTHROMBIN TIME: CPT | Performed by: EMERGENCY MEDICINE

## 2019-09-01 PROCEDURE — 82805 BLOOD GASES W/O2 SATURATION: CPT

## 2019-09-01 PROCEDURE — 82962 GLUCOSE BLOOD TEST: CPT

## 2019-09-01 PROCEDURE — 63710000001 INSULIN LISPRO (HUMAN) PER 5 UNITS: Performed by: INTERNAL MEDICINE

## 2019-09-01 PROCEDURE — 99285 EMERGENCY DEPT VISIT HI MDM: CPT

## 2019-09-01 RX ORDER — ACETAMINOPHEN 325 MG/1
650 TABLET ORAL EVERY 6 HOURS PRN
Status: DISCONTINUED | OUTPATIENT
Start: 2019-09-01 | End: 2019-09-03 | Stop reason: HOSPADM

## 2019-09-01 RX ORDER — SACCHAROMYCES BOULARDII 250 MG
250 CAPSULE ORAL DAILY
COMMUNITY
End: 2021-02-17 | Stop reason: HOSPADM

## 2019-09-01 RX ORDER — SODIUM CHLORIDE 0.9 % (FLUSH) 0.9 %
10 SYRINGE (ML) INJECTION EVERY 12 HOURS SCHEDULED
Status: DISCONTINUED | OUTPATIENT
Start: 2019-09-01 | End: 2019-09-03 | Stop reason: HOSPADM

## 2019-09-01 RX ORDER — CALCITRIOL 0.25 UG/1
0.25 CAPSULE, LIQUID FILLED ORAL DAILY
Status: DISCONTINUED | OUTPATIENT
Start: 2019-09-01 | End: 2019-09-03 | Stop reason: HOSPADM

## 2019-09-01 RX ORDER — HYDROCODONE BITARTRATE AND ACETAMINOPHEN 5; 325 MG/1; MG/1
1 TABLET ORAL EVERY 8 HOURS PRN
Status: DISCONTINUED | OUTPATIENT
Start: 2019-09-01 | End: 2019-09-03 | Stop reason: HOSPADM

## 2019-09-01 RX ORDER — SACCHAROMYCES BOULARDII 250 MG
250 CAPSULE ORAL DAILY
Status: DISCONTINUED | OUTPATIENT
Start: 2019-09-01 | End: 2019-09-03 | Stop reason: HOSPADM

## 2019-09-01 RX ORDER — ONDANSETRON 4 MG/1
4 TABLET, FILM COATED ORAL EVERY 8 HOURS PRN
Status: DISCONTINUED | OUTPATIENT
Start: 2019-09-01 | End: 2019-09-03 | Stop reason: HOSPADM

## 2019-09-01 RX ORDER — ATORVASTATIN CALCIUM 10 MG/1
10 TABLET, FILM COATED ORAL NIGHTLY
Status: DISCONTINUED | OUTPATIENT
Start: 2019-09-01 | End: 2019-09-03 | Stop reason: HOSPADM

## 2019-09-01 RX ORDER — PANTOPRAZOLE SODIUM 40 MG/1
40 TABLET, DELAYED RELEASE ORAL DAILY
Status: DISCONTINUED | OUTPATIENT
Start: 2019-09-01 | End: 2019-09-03 | Stop reason: HOSPADM

## 2019-09-01 RX ORDER — IPRATROPIUM BROMIDE AND ALBUTEROL SULFATE 2.5; .5 MG/3ML; MG/3ML
3 SOLUTION RESPIRATORY (INHALATION) EVERY 6 HOURS PRN
Status: DISCONTINUED | OUTPATIENT
Start: 2019-09-01 | End: 2019-09-03 | Stop reason: HOSPADM

## 2019-09-01 RX ORDER — BUDESONIDE AND FORMOTEROL FUMARATE DIHYDRATE 160; 4.5 UG/1; UG/1
2 AEROSOL RESPIRATORY (INHALATION)
Status: DISCONTINUED | OUTPATIENT
Start: 2019-09-01 | End: 2019-09-03 | Stop reason: HOSPADM

## 2019-09-01 RX ORDER — WARFARIN SODIUM 2.5 MG/1
2.5 TABLET ORAL
Status: ON HOLD | COMMUNITY
End: 2019-10-01 | Stop reason: SDUPTHER

## 2019-09-01 RX ORDER — ALBUTEROL SULFATE 90 UG/1
2 AEROSOL, METERED RESPIRATORY (INHALATION) EVERY 4 HOURS PRN
Status: DISCONTINUED | OUTPATIENT
Start: 2019-09-01 | End: 2019-09-03 | Stop reason: HOSPADM

## 2019-09-01 RX ORDER — SODIUM CHLORIDE 0.9 % (FLUSH) 0.9 %
10 SYRINGE (ML) INJECTION AS NEEDED
Status: DISCONTINUED | OUTPATIENT
Start: 2019-09-01 | End: 2019-09-03 | Stop reason: HOSPADM

## 2019-09-01 RX ORDER — CARVEDILOL 12.5 MG/1
25 TABLET ORAL 2 TIMES DAILY WITH MEALS
Status: DISCONTINUED | OUTPATIENT
Start: 2019-09-01 | End: 2019-09-03 | Stop reason: HOSPADM

## 2019-09-01 RX ORDER — PROMETHAZINE HYDROCHLORIDE 25 MG/1
25 TABLET ORAL 3 TIMES WEEKLY
Status: DISCONTINUED | OUTPATIENT
Start: 2019-09-02 | End: 2019-09-03 | Stop reason: HOSPADM

## 2019-09-01 RX ORDER — ONDANSETRON 2 MG/ML
4 INJECTION INTRAMUSCULAR; INTRAVENOUS EVERY 6 HOURS PRN
Status: DISCONTINUED | OUTPATIENT
Start: 2019-09-01 | End: 2019-09-03 | Stop reason: HOSPADM

## 2019-09-01 RX ORDER — WARFARIN SODIUM 4 MG/1
4 TABLET ORAL
Status: ON HOLD | COMMUNITY
End: 2019-10-01 | Stop reason: SDUPTHER

## 2019-09-01 RX ORDER — NICOTINE POLACRILEX 4 MG
15 LOZENGE BUCCAL
Status: DISCONTINUED | OUTPATIENT
Start: 2019-09-01 | End: 2019-09-03 | Stop reason: HOSPADM

## 2019-09-01 RX ORDER — SODIUM CHLORIDE 0.9 % (FLUSH) 0.9 %
10 SYRINGE (ML) INJECTION AS NEEDED
Status: DISCONTINUED | OUTPATIENT
Start: 2019-09-01 | End: 2019-09-03 | Stop reason: SDUPTHER

## 2019-09-01 RX ORDER — ISOSORBIDE MONONITRATE 30 MG/1
30 TABLET, EXTENDED RELEASE ORAL DAILY
Status: DISCONTINUED | OUTPATIENT
Start: 2019-09-01 | End: 2019-09-03 | Stop reason: HOSPADM

## 2019-09-01 RX ORDER — DOCUSATE SODIUM 100 MG/1
100 CAPSULE, LIQUID FILLED ORAL 2 TIMES DAILY
Status: DISCONTINUED | OUTPATIENT
Start: 2019-09-01 | End: 2019-09-03 | Stop reason: HOSPADM

## 2019-09-01 RX ORDER — SEVELAMER CARBONATE FOR ORAL SUSPENSION 800 MG/1
800 POWDER, FOR SUSPENSION ORAL
Status: DISCONTINUED | OUTPATIENT
Start: 2019-09-01 | End: 2019-09-03 | Stop reason: HOSPADM

## 2019-09-01 RX ORDER — DEXTROSE MONOHYDRATE 25 G/50ML
25 INJECTION, SOLUTION INTRAVENOUS
Status: DISCONTINUED | OUTPATIENT
Start: 2019-09-01 | End: 2019-09-03 | Stop reason: HOSPADM

## 2019-09-01 RX ADMIN — ISOSORBIDE MONONITRATE 30 MG: 30 TABLET, EXTENDED RELEASE ORAL at 15:40

## 2019-09-01 RX ADMIN — ONDANSETRON HYDROCHLORIDE 4 MG: 4 TABLET, FILM COATED ORAL at 15:40

## 2019-09-01 RX ADMIN — Medication 250 MG: at 15:40

## 2019-09-01 RX ADMIN — CARVEDILOL 25 MG: 12.5 TABLET, FILM COATED ORAL at 17:16

## 2019-09-01 RX ADMIN — PANTOPRAZOLE SODIUM 40 MG: 40 TABLET, DELAYED RELEASE ORAL at 15:40

## 2019-09-01 RX ADMIN — INSULIN LISPRO 4 UNITS: 100 INJECTION, SOLUTION INTRAVENOUS; SUBCUTANEOUS at 17:16

## 2019-09-01 RX ADMIN — SERTRALINE HYDROCHLORIDE 50 MG: 50 TABLET ORAL at 15:40

## 2019-09-01 RX ADMIN — HYDROCODONE BITARTRATE AND ACETAMINOPHEN 1 TABLET: 5; 325 TABLET ORAL at 19:35

## 2019-09-01 RX ADMIN — SEVELAMER CARBONATE 0.8 G: 0.8 POWDER, FOR SUSPENSION ORAL at 17:16

## 2019-09-01 RX ADMIN — SODIUM CHLORIDE, PRESERVATIVE FREE 10 ML: 5 INJECTION INTRAVENOUS at 21:23

## 2019-09-01 RX ADMIN — WARFARIN SODIUM 6.5 MG: 2.5 TABLET ORAL at 17:16

## 2019-09-01 RX ADMIN — CEFTRIAXONE 1 G: 1 INJECTION, POWDER, FOR SOLUTION INTRAMUSCULAR; INTRAVENOUS at 13:21

## 2019-09-01 RX ADMIN — INSULIN LISPRO 4 UNITS: 100 INJECTION, SOLUTION INTRAVENOUS; SUBCUTANEOUS at 21:17

## 2019-09-01 RX ADMIN — BUDESONIDE AND FORMOTEROL FUMARATE DIHYDRATE 2 PUFF: 160; 4.5 AEROSOL RESPIRATORY (INHALATION) at 20:11

## 2019-09-01 RX ADMIN — ONDANSETRON 4 MG: 2 INJECTION INTRAMUSCULAR; INTRAVENOUS at 19:36

## 2019-09-01 NOTE — PLAN OF CARE
Problem: Skin Injury Risk (Adult)  Goal: Identify Related Risk Factors and Signs and Symptoms   09/01/19 1847   Skin Injury Risk (Adult)   Related Risk Factors (Skin Injury Risk) body weight extremes;mobility impaired     Goal: Skin Health and Integrity  Outcome: Ongoing (interventions implemented as appropriate)   09/01/19 1847   Skin Injury Risk (Adult)   Skin Health and Integrity making progress toward outcome       Problem: Patient Care Overview  Goal: Plan of Care Review  Outcome: Ongoing (interventions implemented as appropriate)

## 2019-09-01 NOTE — PROGRESS NOTES
"Pharmacy Consult  -  Warfarin    Joy Bueno is a  69 y.o. female   Height - 162.6 cm (64\")  Weight - 136 kg (300 lb)    Consulting Provider: - Dr. Rios  Indication: - past medical history of recent LUE DVT  Goal INR: - 2 to 3  Home Regimen:   - 6.5mg daily   -     Bridge Therapy: No       Drug-Drug Interactions with current regimen:   None at this time    Warfarin Dosing During Admission:    Date  09/01           INR  1           Dose  (6.5mg)                Education Provided:    Discharge Follow up: Resident of [facility]   Following Provider -   Follow up time range or appointment -      Labs:    Results from last 7 days   Lab Units 09/01/19  0921   INR  1.00   HEMOGLOBIN g/dL 10.3*   HEMATOCRIT % 35.0   PLATELETS 10*3/mm3 245     Results from last 7 days   Lab Units 09/01/19  0921   SODIUM mmol/L 134*   POTASSIUM mmol/L 4.9   CHLORIDE mmol/L 95*   CO2 mmol/L 26.0   BUN mg/dL 42*   CREATININE mg/dL 4.41*   CALCIUM mg/dL 9.3   BILIRUBIN mg/dL 0.4   ALK PHOS U/L 113   ALT (SGPT) U/L 6   AST (SGOT) U/L 11   GLUCOSE mg/dL 348*       Current dietary intake: new admit      Assessment/Plan:   1. Start patient of prior home dose of warfarin at 6.5mg daily.  2. Draw daily PT/INR and adjust doses accordingly.   3. Monitor for drug interactions and signs and symptoms of bleeding.      Thank you  Raheel Rogers [facility]  9/1/2019  3:03 PM      "

## 2019-09-01 NOTE — ED PROVIDER NOTES
Subjective   69-year-old female resident at [facility] arrives via EMS after having an episode of low O2 saturation in the 80s at the nursing home.  The patient states that she feels short of breath and is having some chest pain since this morning.  She feels nauseated and states that she vomited once or twice this morning.  She denies any abdominal pain.  The patient denies recent fever.  She states that she is nonambulatory for probably the past year.  She has a history of insulin-dependent type 2 diabetes, end-stage renal disease on dialysis, remote history of DVT (on Coumadin), and chronic diastolic heart failure.  She gets dialysis on M-W-F and her last dialysis was Friday.  The patient is a former smoker but quit 3 years ago.  No alcohol use.  She cannot recall her PCPs name.              Review of Systems   Constitutional: Negative for fatigue and fever.   HENT: Negative for sore throat.    Respiratory: Positive for shortness of breath. Negative for cough.    Cardiovascular: Positive for chest pain. Negative for palpitations and leg swelling.   Gastrointestinal: Positive for nausea.   Genitourinary: Negative for dysuria.   Musculoskeletal: Negative for back pain.   Skin: Negative for rash.   Allergic/Immunologic: Negative for immunocompromised state.   Neurological: Negative for headaches.   Hematological: Negative.    Psychiatric/Behavioral: Negative.        Past Medical History:   Diagnosis Date   • Acute on chronic diastolic heart failure (CMS/HCC)    • Acute on chronic heart failure (CMS/HCC)    • Anemia    • Anxiety    • Asthma    • Chronic diastolic heart failure (CMS/HCC)    • Chronic kidney disease    • Diabetes mellitus (CMS/HCC)    • Diabetes mellitus, type II (CMS/HCC) 8/3/2018   • History of Clostridium difficile infection 8/3/2018   • History of respiratory failure, since off home oxygen 8/3/2018   • History of UTI 8/3/2018   • Hypertension    • Morbid obesity (CMS/HCC)    •  Osteoarthritis    • Sleep apnea    • Tinea capitis 8/3/2018       Allergies   Allergen Reactions   • Geodon [Ziprasidone Hcl] Unknown (See Comments)     PT DOESN'T REMEMBER   • Haldol [Haloperidol Lactate] Unknown (See Comments)     PT DOESN'T REMEMBER   • Seroquel [Quetiapine Fumarate] Unknown (See Comments)     PT DOESN'T REMEMBER       Past Surgical History:   Procedure Laterality Date   • ARM LESION/CYST EXCISION N/A 2019    Procedure: ABSCESS DRAINAGE X2 ON BACK;  Surgeon: Carlos Enrique Calderón MD;  Location:  [facility];  Service: General   • ARTERIOVENOUS FISTULA/SHUNT SURGERY Left 10/22/2018    Procedure: LEFT UPPER EXTREMITY ARTERIOVENOUS FISTULA CREATION;  Surgeon: Carlos Enrique Calderón MD;  Location:  [facility];  Service: Vascular   •  SECTION     • COLONOSCOPY N/A 2017    Procedure: COLONOSCOPY;  Surgeon: Mark I Brunner, MD;  Location:  [facility];  Service:    • ENDOSCOPY N/A 2017    Procedure: ESOPHAGOGASTRODUODENOSCOPY ;  Surgeon: Velasquez Call MD;  Location:  [facility];  Service:    • HERNIA REPAIR     • INSERTION HEMODIALYSIS CATHETER Right 10/17/2018    Procedure: HEMODIALYSIS CATHETER INSERTION;  Surgeon: Carlos Enrique Calderón MD;  Location:  [facility];  Service: General       Family History   Problem Relation Age of Onset   • Cardiomyopathy Mother    • Stroke Father    • Diabetes Father        Social History     Socioeconomic History   • Marital status:      Spouse name: Not on file   • Number of children: Not on file   • Years of education: Not on file   • Highest education level: Not on file   Tobacco Use   • Smoking status: Former Smoker     Packs/day: 0.25   • Smokeless tobacco: Never Used   • Tobacco comment: unknown when quit, maybe last year   Substance and Sexual Activity   • Alcohol use: No   • Drug use: No   • Sexual activity: No   Social History Narrative    Mrs. Bueno is a 68 year old AA  female. She lives alone in [place] but  has been in rehab for some time. She has a daughter. She does not have an advanced directive.           Objective   Physical Exam   Constitutional: She is oriented to person, place, and time. She appears well-developed and well-nourished. No distress.   HENT:   Head: Normocephalic.   Eyes: Pupils are equal, round, and reactive to light.   Neck: Normal range of motion.   Cardiovascular: Normal rate, regular rhythm and normal heart sounds.   Pulmonary/Chest: Effort normal and breath sounds normal.   Abdominal: Soft.   Musculoskeletal: Normal range of motion. She exhibits no edema or tenderness.   Neurological: She is alert and oriented to person, place, and time.   Skin: Skin is warm and dry.   Psychiatric: She has a normal mood and affect.       Procedures           ED Course      The pt is resting comfortably.  CXR is negative.  She has a UTI on a cath specimen with TNTC WBCs and 2+ bacteria.  Her troponins are elevated and I feel that this is likely secondary to her renal failure, although her delta did trend up a bit.  Her INR is subtherapeutic at 1.0.  I spoke with her about this and she states that she was taken off her coumadin a while back but can't tell me why.  Her vitals shows her sats in the upper 90s and she is not tachypneic or tachycardic.  No pleuritic pain.  I spoke with Dr. Simpson about the pt.  He felt that given her renal failure on dialysis, and her stable vitals, that he would not do a CTA chest but would treat the UTI and admit for continued troponin monitoring.  Pt was given Rocephin 1 g IV.              MDM      Final diagnoses:   Urinary tract infection without hematuria, site unspecified   Chronic kidney disease with end stage renal failure on dialysis (CMS/[facility])   Chest pain, unspecified type   Elevated troponin            Jimi Ricks PA  09/01/19 3661       Jimi Ricks PA  09/20/19 7268

## 2019-09-01 NOTE — CONSULTS
Referring Provider: Dr. Neel Rios  Reason for Consultation: ESRD and its complications    Subjective     Chief complaint abdominal pain nausea, vomiting and diarrhea    History of present illness:    69-year-old morbidly obese -American female history of end-stage renal disease on  through a tunnel catheter.  Patient does not have a AV graft or a fistula due to abscess in her back.  Patient presented today that she started having abdominal pain with nausea vomiting, had some diarrhea.  She was last dialyzed on Friday.  Patient is been admitted at this time for further work-up, her troponin was 0.137 and then increase to one 0.166.  Patient has a abscess I&D done by Dr. Rocha on 822 cultures have grown as Proteus, she has been treated by antibiotic.    History  Past Medical History:   Diagnosis Date   • Acute on chronic diastolic heart failure (CMS/HCC)    • Acute on chronic heart failure (CMS/HCC)    • Anemia    • Anxiety    • Asthma    • Chronic diastolic heart failure (CMS/HCC)    • Chronic kidney disease    • Diabetes mellitus (CMS/[facility])    • Diabetes mellitus, type II (CMS/HCC) 8/3/2018   • History of Clostridium difficile infection 8/3/2018   • History of respiratory failure, since off home oxygen 8/3/2018   • History of UTI 8/3/2018   • Hypertension    • Morbid obesity (CMS/[facility])    • Osteoarthritis    • Sleep apnea    • Tinea capitis 8/3/2018   ,   Past Surgical History:   Procedure Laterality Date   • ARM LESION/CYST EXCISION N/A 2019    Procedure: ABSCESS DRAINAGE X2 ON BACK;  Surgeon: Carlos Enrique Calderón MD;  Location: [facility] OR;  Service: General   • ARTERIOVENOUS FISTULA/SHUNT SURGERY Left 10/22/2018    Procedure: LEFT UPPER EXTREMITY ARTERIOVENOUS FISTULA CREATION;  Surgeon: Carlos Enrique Calderón MD;  Location:  [facility] OR;  Service: Vascular   •  SECTION     • COLONOSCOPY N/A 2017    Procedure: COLONOSCOPY;  Surgeon: Mark I Brunner, MD;  Location:   [facility];  Service:    • ENDOSCOPY N/A 8/25/2017    Procedure: ESOPHAGOGASTRODUODENOSCOPY ;  Surgeon: Velasquez Call MD;  Location:  [facility];  Service:    • HERNIA REPAIR     • INSERTION HEMODIALYSIS CATHETER Right 10/17/2018    Procedure: HEMODIALYSIS CATHETER INSERTION;  Surgeon: Carlos Enrique Calderón MD;  Location:  [facility] OR;  Service: General   ,   Family History   Problem Relation Age of Onset   • Cardiomyopathy Mother    • Stroke Father    • Diabetes Father    ,   Social History     Tobacco Use   • Smoking status: Former Smoker     Packs/day: 0.25   • Smokeless tobacco: Never Used   • Tobacco comment: unknown when quit, maybe last year   Substance Use Topics   • Alcohol use: No   • Drug use: No   ,   Medications Prior to Admission   Medication Sig Dispense Refill Last Dose   • saccharomyces boulardii (FLORASTOR) 250 MG capsule Take 250 mg by mouth Daily.      • warfarin (COUMADIN) 2.5 MG tablet Take 2.5 mg by mouth Daily. Take with 4mg for a total of 6.5mg      • warfarin (COUMADIN) 4 MG tablet Take 4 mg by mouth Daily. Take with 2.5mg for a total of 6.5mg      • acetaminophen (TYLENOL) 325 MG tablet Take 650 mg by mouth Every 6 (Six) Hours As Needed for Mild Pain .   8/21/2019 at Unknown time   • albuterol (PROVENTIL HFA;VENTOLIN HFA) 108 (90 BASE) MCG/ACT inhaler Inhale 2 puffs Every 4 (Four) Hours As Needed for Wheezing.   Unknown at Unknown time   • atorvastatin (LIPITOR) 10 MG tablet Take 10 mg by mouth Every Night.   8/21/2019 at 2100   • calcitriol (ROCALTROL) 0.25 MCG capsule Take 0.25 mcg by mouth Daily.   8/22/2019 at 0500   • carvedilol (COREG) 25 MG tablet Take 25 mg by mouth 2 (Two) Times a Day With Meals.   8/22/2019 at 0500   • diltiaZEM (CARDIZEM) 2% cream Apply  topically to the appropriate area as directed 3 (Three) Times a Day As Needed (Mix w/Lidocaine 2% oint and apply TID PRN to anal fissure).   Unknown at Unknown time   • docusate sodium (COLACE) 100 MG capsule Take 1  capsule by mouth 2 (Two) Times a Day.   8/22/2019 at 0500   • fluticasone-salmeterol (ADVAIR) 500-50 MCG/DOSE DISKUS Inhale 1 puff 2 (Two) Times a Day.   8/22/2019 at 0500   • HYDROcodone-acetaminophen (NORCO) 5-325 MG per tablet Take 1 tablet by mouth Every 8 (Eight) Hours As Needed for Moderate Pain  (Pain). 15 tablet 0    • insulin aspart (novoLOG FLEXPEN) 100 UNIT/ML solution pen-injector sc pen Inject 5 Units under the skin into the appropriate area as directed 3 (Three) Times a Day With Meals.   Unknown at Unknown time   • ipratropium-albuterol (DUO-NEB) 0.5-2.5 mg/3 ml nebulizer Take 3 mL by nebulization Every 6 (Six) Hours As Needed for Wheezing.   Unknown at Unknown time   • isosorbide mononitrate (IMDUR) 30 MG 24 hr tablet Take 30 mg by mouth Daily.   8/22/2019 at 0500   • Lidocaine 2 % gel Apply  topically 3 (Three) Times a Day As Needed (Mix w/LDiltiazem 2% cream and apply TID PRN to anal fissure).   Unknown at Unknown time   • melatonin 3 MG tablet Take 3 mg by mouth Every Night.   8/21/2019 at 2100   • Multiple Vitamin (TAB-A-STAR) tablet Take 1 tablet by mouth Daily.   Unknown at Unknown time   • ondansetron (ZOFRAN) 4 MG tablet Take 4 mg by mouth Every 8 (Eight) Hours As Needed for Nausea or Vomiting.   8/20/2019 at 1000   • pantoprazole (PROTONIX) 40 MG EC tablet Take 40 mg by mouth Daily.   8/22/2019 at 0500   • phenylephrine-cocoa Butter (PREPARATION H) 0.25-88.44 % suppository suppository Insert  into the rectum As Needed for Hemorrhoids.   Unknown at Unknown time   • polyethylene glycol (MIRALAX) pack packet Take 17 g by mouth 2 (Two) Times a Day.   8/22/2019 at 0500   • promethazine (PHENERGAN) 25 MG tablet Take 25 mg by mouth 3 (Three) Times a Week. On dialysis days (Mon Weds Fri)   8/21/2019 at 0500   • sertraline (ZOLOFT) 50 MG tablet Take 50 mg by mouth Daily.   8/22/2019 at 0500   • sevelamer (RENVELA) 800 MG tablet Take 800 mg by mouth 3 (Three) Times a Day With Meals. 0800, 1200 & 1700    8/21/2019 at 1700   • vitamin D (ERGOCALCIFEROL) 13727 units capsule capsule Take 50,000 Units by mouth 1 (One) Time Per Week. TAKES ON WEDNESDAYS   Unknown at Unknown time   , Scheduled Meds:    atorvastatin 10 mg Oral Nightly   budesonide-formoterol 2 puff Inhalation BID - RT   calcitriol 0.25 mcg Oral Daily   carvedilol 25 mg Oral BID With Meals   [START ON 9/2/2019] ceftriaxone 1 g Intravenous Q24H   docusate sodium 100 mg Oral BID   insulin lispro 0-9 Units Subcutaneous 4x Daily With Meals & Nightly   isosorbide mononitrate 30 mg Oral Daily   pantoprazole 40 mg Oral Daily   polyethylene glycol 17 g Oral BID   [START ON 9/2/2019] promethazine 25 mg Oral Once per day on Mon Wed Fri   saccharomyces boulardii 250 mg Oral Daily   sertraline 50 mg Oral Daily   sevelamer 800 mg Oral TID With Meals   sodium chloride 10 mL Intravenous Q12H   warfarin 6.5 mg Oral Daily   , Continuous Infusions:    Pharmacy to dose warfarin    , PRN Meds:  •  acetaminophen  •  albuterol sulfate HFA  •  dextrose  •  dextrose  •  glucagon (human recombinant)  •  HYDROcodone-acetaminophen  •  ipratropium-albuterol  •  ondansetron  •  Pharmacy to dose warfarin  •  sodium chloride  •  sodium chloride and Allergies:  Geodon [ziprasidone hcl]; Haldol [haloperidol lactate]; and Seroquel [quetiapine fumarate]    Review of Systems  Pertinent items are noted in HPI, all other systems reviewed and negative    Objective     Vital Signs  Temp:  [98.1 °F (36.7 °C)] 98.1 °F (36.7 °C)  Heart Rate:  [68-73] 69  Resp:  [16-18] 18  BP: (136-151)/(85-99) 136/86    I/O this shift:  In: 100 [IV Piggyback:100]  Out: -   No intake/output data recorded.    Physical Exam:  General Appearance: Alert, oriented, no obvious distress.  Neatly obese -American female,  HEENT: Atraumatic normocephalic head.  Eyes pupils reactive, EOMI, nose no bleed, oropharynx is clear tongue is moist neck is supple no JVD thyromegaly or lymph node enlargement.  Axillary lymph node  not enlarged..  Lungs: Clear auscultation, no rales rhonchi's, equal chest movement, nonlabored.  Heart: No gallop, murmur, rub, RRR.  Abdomen: Soft, nontender, positive bowel sounds, no organomegaly.  Extremities: No edema, no cyanosis.  Neuro: No focal deficit, moving all extremities, alert oriented X 3  Tunnel catheter right side noted.  Sign of infection.      Results Review:   I reviewed the patient's new clinical results.    WBC WBC   Date Value Ref Range Status   09/01/2019 7.88 3.40 - 10.80 10*3/mm3 Final      HGB Hemoglobin   Date Value Ref Range Status   09/01/2019 10.3 (L) 12.0 - 15.9 g/dL Final      HCT Hematocrit   Date Value Ref Range Status   09/01/2019 35.0 34.0 - 46.6 % Final      Platlets No results found for: LABPLAT   MCV MCV   Date Value Ref Range Status   09/01/2019 95.9 79.0 - 97.0 fL Final          Sodium Sodium   Date Value Ref Range Status   09/01/2019 134 (L) 136 - 145 mmol/L Final      Potassium Potassium   Date Value Ref Range Status   09/01/2019 4.9 3.5 - 5.2 mmol/L Final      Chloride Chloride   Date Value Ref Range Status   09/01/2019 95 (L) 98 - 107 mmol/L Final      CO2 CO2   Date Value Ref Range Status   09/01/2019 26.0 22.0 - 29.0 mmol/L Final      BUN BUN   Date Value Ref Range Status   09/01/2019 42 (H) 8 - 23 mg/dL Final      Creatinine Creatinine   Date Value Ref Range Status   09/01/2019 4.41 (H) 0.57 - 1.00 mg/dL Final      Calcium Calcium   Date Value Ref Range Status   09/01/2019 9.3 8.6 - 10.5 mg/dL Final      PO4 No results found for: CAPO4   Albumin Albumin   Date Value Ref Range Status   09/01/2019 3.60 3.50 - 5.20 g/dL Final      Magnesium No results found for: MG   Uric Acid No results found for: URICACID           atorvastatin 10 mg Oral Nightly   budesonide-formoterol 2 puff Inhalation BID - RT   calcitriol 0.25 mcg Oral Daily   carvedilol 25 mg Oral BID With Meals   ceftriaxone 1 g Intravenous Q24H   docusate sodium 100 mg Oral BID   insulin lispro 0-9 Units  Subcutaneous 4x Daily With Meals & Nightly   isosorbide mononitrate 30 mg Oral Daily   pantoprazole 40 mg Oral Daily   polyethylene glycol 17 g Oral BID   [START ON 9/2/2019] promethazine 25 mg Oral Once per day on Mon Wed Fri   saccharomyces boulardii 250 mg Oral Daily   sertraline 50 mg Oral Daily   sevelamer 800 mg Oral TID With Meals   sodium chloride 10 mL Intravenous Q12H   warfarin 6.5 mg Oral Daily       Pharmacy to dose warfarin        Assessment/Plan       Abdominal pain    Essential hypertension    Morbid obesity (CMS/HCC)    UTI (urinary tract infection), bacterial    Type 2 diabetes mellitus with chronic kidney disease on chronic dialysis, with long-term current use of insulin (CMS/HCC)    Abscess of back s/p drainage as outpatient 8/22    ESRD (end stage renal disease) (CMS/[facility])    Elevated troponin    Urinary tract infection without hematuria      1.  ESRD dialysis Monday Wednesday Friday at [facility].  Last dialyzed on Friday.  2.  Abdominal pain, nausea vomiting and diarrhea.  Patient recently has been on antibiotic.  3.  History of hypertension.  4.  Diabetes type 2.  5.  Essential hypertension.  6.  Anemia of chronic kidney disease.  7.  Secondary hyperparathyroidism treated with calcitriol.  8.  Hyperphosphatemia secondary to renal failure treated with phosphate binders.  Plan:  Dialysis orders will be given.  Iron studies will be done.  Old records will be called from the dialysis unit.  Adjust antibiotic according to ESRD.    I discussed the patients findings and my recommendations with patient and nursing staff    Irineo Latham MD  09/01/19  @NOW

## 2019-09-01 NOTE — H&P
[facility] Medicine Services  HISTORY AND PHYSICAL    Patient Name: Joy Bueno  : 1950  MRN: [id number]  Primary Care Physician: Goldy Dior MD  Date of admission: 2019      Subjective   Subjective     Chief Complaint:  abd pain    HPI:  Joy Bueno is a 69 y.o. female with a history of morbid obesity, long-term nursing home resident and essentially bedbound, end-stage renal disease on  dialysis, type 2 diabetes, prior history of DVT on Coumadin who presents to the emergency room with complaints of abdominal pain.  Patient is a relatively poor historian so is difficult to get an accurate picture, but she tells me she woke up this morning with her belly hurting.  She thinks is related to the food at her facility.  She had a normal bowel movement this morning.  No nausea or vomiting.  Had some brief chest discomfort associated with this.  She has not missed any recent dialysis sessions.    Work-up in the emergency room was significant for troponin of 0.137 and then 0.166, however these are consistent with prior values in July.  She tells me she has no history of heart disease, has had negative stress test in the past but none in our system.    She does still make urine and urinalysis was significant for large leukocytes, too numerous to count white blood cells.  No known fevers and white blood cell count is normal.  She denies any dysuria.    Of note it looks like she underwent I&D of 2 back abscesses with Dr. Rocha on .  Cultures from they are growing Proteus.  She tells me she was put on antibiotics for this but cannot tell me which ones though she thinks she is still taking them.      Review of Systems   Gen- No fevers, chills  CV- + chest pain, palpitations  Resp- No cough, dyspnea  GI- No N/V/D, + abd pain    All other systems reviewed and negative except any additional pertinent positives and negatives discussed in HPI.      Personal History     Past Medical History:   Diagnosis Date   • Acute on chronic diastolic heart failure (CMS/[facility])    • Acute on chronic heart failure (CMS/[facility])    • Anemia    • Anxiety    • Asthma    • Chronic diastolic heart failure (CMS/[facility])    • Chronic kidney disease    • Diabetes mellitus (CMS/[facility])    • Diabetes mellitus, type II (CMS/[facility]) 8/3/2018   • History of Clostridium difficile infection 8/3/2018   • History of respiratory failure, since off home oxygen 8/3/2018   • History of UTI 8/3/2018   • Hypertension    • Morbid obesity (CMS/[facility])    • Osteoarthritis    • Sleep apnea    • Tinea capitis 8/3/2018       Past Surgical History:   Procedure Laterality Date   • ARM LESION/CYST EXCISION N/A 2019    Procedure: ABSCESS DRAINAGE X2 ON BACK;  Surgeon: Carlos Enrique Calderón MD;  Location:  [facility] OR;  Service: General   • ARTERIOVENOUS FISTULA/SHUNT SURGERY Left 10/22/2018    Procedure: LEFT UPPER EXTREMITY ARTERIOVENOUS FISTULA CREATION;  Surgeon: Carlos Enrique Calderón MD;  Location:  [facility] OR;  Service: Vascular   •  SECTION     • COLONOSCOPY N/A 2017    Procedure: COLONOSCOPY;  Surgeon: Mark I Brunner, MD;  Location:  [facility];  Service:    • ENDOSCOPY N/A 2017    Procedure: ESOPHAGOGASTRODUODENOSCOPY ;  Surgeon: Velasquez Call MD;  Location:  [facility];  Service:    • HERNIA REPAIR     • INSERTION HEMODIALYSIS CATHETER Right 10/17/2018    Procedure: HEMODIALYSIS CATHETER INSERTION;  Surgeon: Carlos Enrique Calderón MD;  Location:  [facility] OR;  Service: General       Family History: family history includes Cardiomyopathy in her mother; Diabetes in her father; Stroke in her father. Otherwise pertinent FHx was reviewed and unremarkable.     Social History:  reports that she has quit smoking. She smoked 0.25 packs per day. She has never used smokeless tobacco. She reports that she does not drink alcohol or use drugs.  Social History     Social History Narrative    Mrs.  Myles is a 68 year old AA  female. She lives alone in [place] but has been in rehab for some time. She has a daughter. She does not have an advanced directive.   Long-term resident of [facility]    Medications:    Available home medication information reviewed.      Allergies   Allergen Reactions   • Geodon [Ziprasidone Hcl] Unknown (See Comments)     PT DOESN'T REMEMBER   • Haldol [Haloperidol Lactate] Unknown (See Comments)     PT DOESN'T REMEMBER   • Seroquel [Quetiapine Fumarate] Unknown (See Comments)     PT DOESN'T REMEMBER       Objective   Objective     Vital Signs:   Temp:  [98.1 °F (36.7 °C)] 98.1 °F (36.7 °C)  Heart Rate:  [68-73] 73  Resp:  [16-18] 18  BP: (136-151)/(85-99) 136/86        Physical Exam   Constitutional: Awake, alert  Eyes: PERRLA, sclerae anicteric, no conjunctival injection  HENT: NCAT, mucous membranes moist  Neck: Supple, no thyromegaly, no lymphadenopathy, trachea midline  Respiratory: Clear to auscultation bilaterally, nonlabored respirations   Cardiovascular: RRR, no murmurs, rubs, or gallops.  Dialyis catheter in right chest.  Gastrointestinal: obese, Positive bowel sounds, soft, nontender, nondistended  Musculoskeletal: 1+ bilateral ankle edema, no clubbing or cyanosis to extremities  Psychiatric: Appropriate affect, cooperative  Neurologic: Oriented x 3, no focal deficits  Skin: 2 packed wounds on back from recent I&D.  They look to be healing well.      Results Reviewed:  I have personally reviewed current lab and radiology data.    Results from last 7 days   Lab Units 09/01/19  0921   WBC 10*3/mm3 7.88   HEMOGLOBIN g/dL 10.3*   HEMATOCRIT % 35.0   PLATELETS 10*3/mm3 245   INR  1.00     Results from last 7 days   Lab Units 09/01/19  1146 09/01/19  0921   SODIUM mmol/L  --  134*   POTASSIUM mmol/L  --  4.9   CHLORIDE mmol/L  --  95*   CO2 mmol/L  --  26.0   BUN mg/dL  --  42*   CREATININE mg/dL  --  4.41*   GLUCOSE mg/dL  --  348*   CALCIUM mg/dL  --  9.3    ALT (SGPT) U/L  --  6   AST (SGOT) U/L  --  11   TROPONIN T ng/mL 0.166* 0.137*   PROBNP pg/mL  --  1,436.0*     Estimated Creatinine Clearance: 16.8 mL/min (A) (by C-G formula based on SCr of 4.41 mg/dL (H)).  Brief Urine Lab Results  (Last result in the past 365 days)      Color   Clarity   Blood   Leuk Est   Nitrite   Protein   CREAT   Urine HCG        09/01/19 1214 Yellow Turbid Moderate (2+) Large (3+) Negative >=300 mg/dL (3+)             Imaging Results (last 24 hours)     Procedure Component Value Units Date/Time    XR Chest 1 View [[id number]] Collected:  09/01/19 0945     Updated:  09/01/19 1357    Narrative:          EXAMINATION: XR CHEST, SINGLE VIEW - 09/01/2019     INDICATION: Chest pain.     COMPARISON: 07/22/2019     FINDINGS: Right IJ dialysis-type catheter is again seen with tip in the  right atrium. Patient is rotated to the left. Heart is borderline  enlarged. Vasculature is mildly cephalized. There is little if any  evidence to suggest interstitial edema. No lung consolidation, effusion  or pneumothorax is seen.           Impression:       Mild pulmonary vascular congestion. Minimal if any  interstitial edema. No other evidence of active chest disease is seen.     DICTATED:   09/01/2019  EDITED/ls :   09/01/2019            Results for orders placed during the hospital encounter of 09/02/17   Adult Transthoracic Echo Complete    Narrative · Left ventricular wall thickness is consistent with mild concentric   hypertrophy.  · Left atrial cavity size is mildly dilated.  · Mild mitral valve regurgitation is present  · calcification of the aortic valve  · Mild tricuspid valve regurgitation is present.          Assessment/Plan   Assessment / Plan     Active Hospital Problems    Diagnosis POA   • **Abdominal pain [R10.9] Yes     Priority: Medium   • Elevated troponin [R74.8] Yes   • Urinary tract infection without hematuria [N39.0] Yes   • ESRD (end stage renal disease) (CMS/HCC) [N18.6] Yes   •  Abscess of back s/p drainage as outpatient 8/22 [L02.212] Yes   • Type 2 diabetes mellitus with chronic kidney disease on chronic dialysis, with long-term current use of insulin (CMS/[facility]) [E11.22, N18.6, Z99.2, Z79.4] Not Applicable   • UTI (urinary tract infection), bacterial [N39.0, A49.9] Yes   • Essential hypertension [I10] Yes   • Morbid obesity (CMS/[facility]) [E66.01] Yes       69-year-old female with multiple medical problems presenting with vague complaints of abdominal pain and not feeling right.  Has not missed any dialysis.  Urinalysis consistent with a UTI.  Abdominal exam is not concerning.    Her abdominal pain discomfort could be related to her UTI.  Also she has been on  unknown oral antibiotics for her recent back abscesses (data deficit) which may be contributing to her symptoms.  For now we will continue Rocephin which should treat both urine infection and the Proteus in her wounds.  I will have wound care see the patient.  Patient also has some minimally elevated cardiac enzymes which I suspect is related to her end-stage renal disease and these are stable compared to values in July.  However she is also not had a ischemic evaluation in our system.  For now we will recheck a troponin in the morning.  If it rises or she has continued nonspecific abdominal pain, could consider cardiology evaluation but will hold for now.  I have notified Dr. Cristina with nephrology and he will see her and continue dialysis while inpatient.  I will place her on basal bolus sliding scale insulin and titrate as necessary.    Sounds like Coumadin was held prior to I&D of her back abscesses and not restarted back.  See no reason not to resume that now, so I will have pharmacy dose.    Back to [facility] when medically ready, anticipate 2 to 3 days.    DVT prophylaxis:  Will start coumadin back    CODE STATUS:    Code Status and Medical Interventions:   Ordered at: 09/01/19 3112     Level Of Support Discussed With:     Patient     Code Status:    CPR     Medical Interventions (Level of Support Prior to Arrest):    Full       Admission Status:  I believe this patient meets inpatient criteria for treatment of UTI with IV antibiotics, further evaluation of abdominal pain that can only be done in the hospital.  Anticipate her to be here at least 2 midnights.    Electronically signed by Neel Rios MD, 09/01/19, 2:36 PM.

## 2019-09-02 ENCOUNTER — APPOINTMENT (OUTPATIENT)
Dept: NEPHROLOGY | Facility: HOSPITAL | Age: 69
End: 2019-09-02

## 2019-09-02 LAB
ALBUMIN SERPL-MCNC: 3.5 G/DL (ref 3.5–5.2)
ANION GAP SERPL CALCULATED.3IONS-SCNC: 15 MMOL/L (ref 5–15)
BUN BLD-MCNC: 48 MG/DL (ref 8–23)
BUN/CREAT SERPL: 11.6 (ref 7–25)
CALCIUM SPEC-SCNC: 9.1 MG/DL (ref 8.6–10.5)
CHLORIDE SERPL-SCNC: 97 MMOL/L (ref 98–107)
CO2 SERPL-SCNC: 25 MMOL/L (ref 22–29)
CREAT BLD-MCNC: 4.14 MG/DL (ref 0.57–1)
DEPRECATED RDW RBC AUTO: 59.4 FL (ref 37–54)
ERYTHROCYTE [DISTWIDTH] IN BLOOD BY AUTOMATED COUNT: 16.8 % (ref 12.3–15.4)
GFR SERPL CREATININE-BSD FRML MDRD: 13 ML/MIN/1.73
GFR SERPL CREATININE-BSD FRML MDRD: ABNORMAL ML/MIN/{1.73_M2}
GLUCOSE BLD-MCNC: 248 MG/DL (ref 65–99)
GLUCOSE BLDC GLUCOMTR-MCNC: 166 MG/DL (ref 70–130)
GLUCOSE BLDC GLUCOMTR-MCNC: 199 MG/DL (ref 70–130)
GLUCOSE BLDC GLUCOMTR-MCNC: 274 MG/DL (ref 70–130)
GLUCOSE BLDC GLUCOMTR-MCNC: 313 MG/DL (ref 70–130)
HCT VFR BLD AUTO: 31.5 % (ref 34–46.6)
HGB BLD-MCNC: 9.3 G/DL (ref 12–15.9)
INR PPP: 1.06 (ref 0.85–1.16)
IRON 24H UR-MRATE: 55 MCG/DL (ref 37–145)
IRON SATN MFR SERPL: 24 % (ref 20–50)
MCH RBC QN AUTO: 28.4 PG (ref 26.6–33)
MCHC RBC AUTO-ENTMCNC: 29.5 G/DL (ref 31.5–35.7)
MCV RBC AUTO: 96.3 FL (ref 79–97)
PHOSPHATE SERPL-MCNC: 4.4 MG/DL (ref 2.5–4.5)
PLATELET # BLD AUTO: 257 10*3/MM3 (ref 140–450)
PMV BLD AUTO: 10.4 FL (ref 6–12)
POTASSIUM BLD-SCNC: 4.2 MMOL/L (ref 3.5–5.2)
PROTHROMBIN TIME: 13.3 SECONDS (ref 11.2–14.3)
RBC # BLD AUTO: 3.27 10*6/MM3 (ref 3.77–5.28)
SODIUM BLD-SCNC: 137 MMOL/L (ref 136–145)
TIBC SERPL-MCNC: 232 MCG/DL (ref 298–536)
TRANSFERRIN SERPL-MCNC: 156 MG/DL (ref 200–360)
TROPONIN T SERPL-MCNC: 0.12 NG/ML (ref 0–0.03)
WBC NRBC COR # BLD: 9.78 10*3/MM3 (ref 3.4–10.8)

## 2019-09-02 PROCEDURE — 25010000002 EPOETIN ALFA PER 1000 UNITS: Performed by: INTERNAL MEDICINE

## 2019-09-02 PROCEDURE — 84466 ASSAY OF TRANSFERRIN: CPT | Performed by: INTERNAL MEDICINE

## 2019-09-02 PROCEDURE — 80069 RENAL FUNCTION PANEL: CPT | Performed by: INTERNAL MEDICINE

## 2019-09-02 PROCEDURE — 83540 ASSAY OF IRON: CPT | Performed by: INTERNAL MEDICINE

## 2019-09-02 PROCEDURE — 85610 PROTHROMBIN TIME: CPT

## 2019-09-02 PROCEDURE — 82962 GLUCOSE BLOOD TEST: CPT

## 2019-09-02 PROCEDURE — 84484 ASSAY OF TROPONIN QUANT: CPT | Performed by: INTERNAL MEDICINE

## 2019-09-02 PROCEDURE — 25010000002 ONDANSETRON PER 1 MG: Performed by: PHYSICIAN ASSISTANT

## 2019-09-02 PROCEDURE — 99233 SBSQ HOSP IP/OBS HIGH 50: CPT | Performed by: INTERNAL MEDICINE

## 2019-09-02 PROCEDURE — 5A1D70Z PERFORMANCE OF URINARY FILTRATION, INTERMITTENT, LESS THAN 6 HOURS PER DAY: ICD-10-PCS | Performed by: INTERNAL MEDICINE

## 2019-09-02 PROCEDURE — 85027 COMPLETE CBC AUTOMATED: CPT | Performed by: INTERNAL MEDICINE

## 2019-09-02 PROCEDURE — 63710000001 PROMETHAZINE PER 25 MG: Performed by: INTERNAL MEDICINE

## 2019-09-02 PROCEDURE — 25010000002 CEFTRIAXONE PER 250 MG: Performed by: INTERNAL MEDICINE

## 2019-09-02 RX ORDER — ALBUMIN (HUMAN) 12.5 G/50ML
12.5 SOLUTION INTRAVENOUS AS NEEDED
Status: ACTIVE | OUTPATIENT
Start: 2019-09-02 | End: 2019-09-02

## 2019-09-02 RX ORDER — DIPHENHYDRAMINE HYDROCHLORIDE, ZINC ACETATE 2; .1 G/100G; G/100G
CREAM TOPICAL 3 TIMES DAILY PRN
Status: DISCONTINUED | OUTPATIENT
Start: 2019-09-02 | End: 2019-09-03 | Stop reason: HOSPADM

## 2019-09-02 RX ADMIN — INSULIN LISPRO 7 UNITS: 100 INJECTION, SOLUTION INTRAVENOUS; SUBCUTANEOUS at 21:22

## 2019-09-02 RX ADMIN — CARVEDILOL 25 MG: 12.5 TABLET, FILM COATED ORAL at 08:54

## 2019-09-02 RX ADMIN — ONDANSETRON 4 MG: 2 INJECTION INTRAMUSCULAR; INTRAVENOUS at 08:36

## 2019-09-02 RX ADMIN — INSULIN LISPRO 2 UNITS: 100 INJECTION, SOLUTION INTRAVENOUS; SUBCUTANEOUS at 12:21

## 2019-09-02 RX ADMIN — ERYTHROPOIETIN 10000 UNITS: 10000 INJECTION, SOLUTION INTRAVENOUS; SUBCUTANEOUS at 09:13

## 2019-09-02 RX ADMIN — WARFARIN SODIUM 6.5 MG: 2.5 TABLET ORAL at 17:20

## 2019-09-02 RX ADMIN — HYDROCODONE BITARTRATE AND ACETAMINOPHEN 1 TABLET: 5; 325 TABLET ORAL at 07:17

## 2019-09-02 RX ADMIN — CALCITRIOL 0.25 MCG: 0.25 CAPSULE ORAL at 08:56

## 2019-09-02 RX ADMIN — DOCUSATE SODIUM 100 MG: 100 CAPSULE, LIQUID FILLED ORAL at 08:56

## 2019-09-02 RX ADMIN — MINERAL OIL, PETROLATUM, PHENYLEPHRINE HCL: 14; 74.9; .25 OINTMENT RECTAL at 00:08

## 2019-09-02 RX ADMIN — POLYETHYLENE GLYCOL 3350 17 G: 17 POWDER, FOR SOLUTION ORAL at 21:23

## 2019-09-02 RX ADMIN — INSULIN LISPRO 6 UNITS: 100 INJECTION, SOLUTION INTRAVENOUS; SUBCUTANEOUS at 17:20

## 2019-09-02 RX ADMIN — SODIUM CHLORIDE, PRESERVATIVE FREE 10 ML: 5 INJECTION INTRAVENOUS at 21:23

## 2019-09-02 RX ADMIN — Medication 250 MG: at 08:56

## 2019-09-02 RX ADMIN — CEFTRIAXONE 1 G: 1 INJECTION, POWDER, FOR SOLUTION INTRAMUSCULAR; INTRAVENOUS at 12:22

## 2019-09-02 RX ADMIN — PROMETHAZINE HYDROCHLORIDE 25 MG: 25 TABLET ORAL at 08:56

## 2019-09-02 RX ADMIN — SODIUM CHLORIDE, PRESERVATIVE FREE 10 ML: 5 INJECTION INTRAVENOUS at 12:22

## 2019-09-02 RX ADMIN — ATORVASTATIN CALCIUM 10 MG: 10 TABLET, FILM COATED ORAL at 21:22

## 2019-09-02 RX ADMIN — SERTRALINE HYDROCHLORIDE 50 MG: 50 TABLET ORAL at 08:57

## 2019-09-02 RX ADMIN — INSULIN LISPRO 2 UNITS: 100 INJECTION, SOLUTION INTRAVENOUS; SUBCUTANEOUS at 08:30

## 2019-09-02 RX ADMIN — PANTOPRAZOLE SODIUM 40 MG: 40 TABLET, DELAYED RELEASE ORAL at 08:56

## 2019-09-02 RX ADMIN — DOCUSATE SODIUM 100 MG: 100 CAPSULE, LIQUID FILLED ORAL at 21:22

## 2019-09-02 RX ADMIN — SEVELAMER CARBONATE 0.8 G: 0.8 POWDER, FOR SUSPENSION ORAL at 17:20

## 2019-09-02 RX ADMIN — SEVELAMER CARBONATE 0.8 G: 0.8 POWDER, FOR SUSPENSION ORAL at 12:21

## 2019-09-02 RX ADMIN — ISOSORBIDE MONONITRATE 30 MG: 30 TABLET, EXTENDED RELEASE ORAL at 08:56

## 2019-09-02 RX ADMIN — ONDANSETRON 4 MG: 2 INJECTION INTRAMUSCULAR; INTRAVENOUS at 01:01

## 2019-09-02 RX ADMIN — CARVEDILOL 25 MG: 12.5 TABLET, FILM COATED ORAL at 17:20

## 2019-09-02 NOTE — PLAN OF CARE
Problem: Patient Care Overview  Goal: Plan of Care Review  Outcome: Ongoing (interventions implemented as appropriate)   09/02/19 1005   Coping/Psychosocial   Plan of Care Reviewed With patient   Plan of Care Review   Progress no change   OTHER   Outcome Summary Patient presents with s/p I&D x2 abscess per Dr. Calderón on 8/22/2019 on her left lateral back and right upper back. Wound beds look good. Left measures 1.5x3.5x0.8cm with 1.2cm of undermining at 3-7 o'clock. Right wound measures 1.5x4.5x0.6cm. Wounds cleaned and irrigated. Will pack with Hydrofiber Ag moistened with saline and secured with Optifoam. Dressing changes and wound care to be done every other day. Please see order. POC discussed with patient. Will continue to follow. Please contact if needs arise. Thanks

## 2019-09-02 NOTE — DISCHARGE PLACEMENT REQUEST
"MALGORZATA MARS, RN    [phone]          Pam Loyd (69 y.o. Female)     Date of Birth Social Security Number Address Home Phone MRN    1950  [address] [address] [phone] [id number]    Scientologist Marital Status          None        Admission Date Admission Type Admitting Provider Attending Provider Department, Room/Bed    9/1/19 Emergency Carlita Jackson DO Roesch, Lyndsey, DO [facility] 5G, S559/1    Discharge Date Discharge Disposition Discharge Destination                       Attending Provider:  Carlita Jackson DO    Allergies:  Geodon [Ziprasidone Hcl], Haldol [Haloperidol Lactate], Seroquel [Quetiapine Fumarate]    Isolation:  None   Infection:  None   Code Status:  CPR    Ht:  162.6 cm (64\")   Wt:  131 kg (288 lb)    Admission Cmt:  None   Principal Problem:  Abdominal pain [R10.9]                 Active Insurance as of 9/1/2019     Primary Coverage     Payor Plan Insurance Group Employer/Plan Group    MEDICARE MEDICARE A & B      Payor Plan Address Payor Plan Phone Number Payor Plan Fax Number Effective Dates    [address] [phone]  5/1/2016 - None Entered    [facility] [id number]       Subscriber Name Subscriber Birth Date Member ID       PAM LOYD 1950 [id number]           Secondary Coverage     Payor Plan Insurance Group Employer/Plan Group    [place]      Payor Plan Address Payor Plan Phone Number Payor Plan Fax Number Effective Dates    [address] [phone]  8/2/2018 - None Entered    [place] [address]       Subscriber Name Subscriber Birth Date Member ID       PAM LOYD 1950 [id number]                 Emergency Contacts      (Rel.) Home Phone Work Phone Mobile Phone    Tessie Dorado (Daughter) [phone] -- [phone]               History & Physical      DossettNeel MD at 9/1/2019  2:36 PM              [facility] " Hospital Medicine Services  HISTORY AND PHYSICAL    Patient Name: Joy Bueno  : 1950  MRN: [id number]  Primary Care Physician: Goldy Dior MD  Date of admission: 2019      Subjective   Subjective     Chief Complaint:  abd pain    HPI:  Joy Bueno is a 69 y.o. female with a history of morbid obesity, long-term nursing home resident and essentially bedbound, end-stage renal disease on  dialysis, type 2 diabetes, prior history of DVT on Coumadin who presents to the emergency room with complaints of abdominal pain.  Patient is a relatively poor historian so is difficult to get an accurate picture, but she tells me she woke up this morning with her belly hurting.  She thinks is related to the food at her facility.  She had a normal bowel movement this morning.  No nausea or vomiting.  Had some brief chest discomfort associated with this.  She has not missed any recent dialysis sessions.    Work-up in the emergency room was significant for troponin of 0.137 and then 0.166, however these are consistent with prior values in July.  She tells me she has no history of heart disease, has had negative stress test in the past but none in our system.    She does still make urine and urinalysis was significant for large leukocytes, too numerous to count white blood cells.  No known fevers and white blood cell count is normal.  She denies any dysuria.    Of note it looks like she underwent I&D of 2 back abscesses with Dr. Rocha on .  Cultures from they are growing Proteus.  She tells me she was put on antibiotics for this but cannot tell me which ones though she thinks she is still taking them.      Review of Systems   Gen- No fevers, chills  CV- + chest pain, palpitations  Resp- No cough, dyspnea  GI- No N/V/D, + abd pain    All other systems reviewed and negative except any additional pertinent positives and negatives discussed in HPI.     Personal History     Past Medical  History:   Diagnosis Date   • Acute on chronic diastolic heart failure (CMS/HCC)    • Acute on chronic heart failure (CMS/formerly [facility])    • Anemia    • Anxiety    • Asthma    • Chronic diastolic heart failure (CMS/HCC)    • Chronic kidney disease    • Diabetes mellitus (CMS/formerly [facility])    • Diabetes mellitus, type II (CMS/HCC) 8/3/2018   • History of Clostridium difficile infection 8/3/2018   • History of respiratory failure, since off home oxygen 8/3/2018   • History of UTI 8/3/2018   • Hypertension    • Morbid obesity (CMS/formerly [facility])    • Osteoarthritis    • Sleep apnea    • Tinea capitis 8/3/2018       Past Surgical History:   Procedure Laterality Date   • ARM LESION/CYST EXCISION N/A 2019    Procedure: ABSCESS DRAINAGE X2 ON BACK;  Surgeon: Carlos Enrique Calderón MD;  Location:  [facility];  Service: General   • ARTERIOVENOUS FISTULA/SHUNT SURGERY Left 10/22/2018    Procedure: LEFT UPPER EXTREMITY ARTERIOVENOUS FISTULA CREATION;  Surgeon: Carlos Enrique Calderón MD;  Location:  [facility];  Service: Vascular   •  SECTION     • COLONOSCOPY N/A 2017    Procedure: COLONOSCOPY;  Surgeon: Mark I Brunner, MD;  Location:  [facility];  Service:    • ENDOSCOPY N/A 2017    Procedure: ESOPHAGOGASTRODUODENOSCOPY ;  Surgeon: Velasquez Call MD;  Location:  [facility];  Service:    • HERNIA REPAIR     • INSERTION HEMODIALYSIS CATHETER Right 10/17/2018    Procedure: HEMODIALYSIS CATHETER INSERTION;  Surgeon: Carlos Enrique Calderón MD;  Location:  [facility];  Service: General       Family History: family history includes Cardiomyopathy in her mother; Diabetes in her father; Stroke in her father. Otherwise pertinent FHx was reviewed and unremarkable.     Social History:  reports that she has quit smoking. She smoked 0.25 packs per day. She has never used smokeless tobacco. She reports that she does not drink alcohol or use drugs.  Social History     Social History Narrative    Mrs. Bueno is a 68 year old AA   female. She lives alone in [place] but has been in rehab for some time. She has a daughter. She does not have an advanced directive.   Long-term resident of [facility]    Medications:    Available home medication information reviewed.      Allergies   Allergen Reactions   • Geodon [Ziprasidone Hcl] Unknown (See Comments)     PT DOESN'T REMEMBER   • Haldol [Haloperidol Lactate] Unknown (See Comments)     PT DOESN'T REMEMBER   • Seroquel [Quetiapine Fumarate] Unknown (See Comments)     PT DOESN'T REMEMBER       Objective   Objective     Vital Signs:   Temp:  [98.1 °F (36.7 °C)] 98.1 °F (36.7 °C)  Heart Rate:  [68-73] 73  Resp:  [16-18] 18  BP: (136-151)/(85-99) 136/86        Physical Exam   Constitutional: Awake, alert  Eyes: PERRLA, sclerae anicteric, no conjunctival injection  HENT: NCAT, mucous membranes moist  Neck: Supple, no thyromegaly, no lymphadenopathy, trachea midline  Respiratory: Clear to auscultation bilaterally, nonlabored respirations   Cardiovascular: RRR, no murmurs, rubs, or gallops.  Dialyis catheter in right chest.  Gastrointestinal: obese, Positive bowel sounds, soft, nontender, nondistended  Musculoskeletal: 1+ bilateral ankle edema, no clubbing or cyanosis to extremities  Psychiatric: Appropriate affect, cooperative  Neurologic: Oriented x 3, no focal deficits  Skin: 2 packed wounds on back from recent I&D.  They look to be healing well.      Results Reviewed:  I have personally reviewed current lab and radiology data.    Results from last 7 days   Lab Units 09/01/19  0921   WBC 10*3/mm3 7.88   HEMOGLOBIN g/dL 10.3*   HEMATOCRIT % 35.0   PLATELETS 10*3/mm3 245   INR  1.00     Results from last 7 days   Lab Units 09/01/19  1146 09/01/19  0921   SODIUM mmol/L  --  134*   POTASSIUM mmol/L  --  4.9   CHLORIDE mmol/L  --  95*   CO2 mmol/L  --  26.0   BUN mg/dL  --  42*   CREATININE mg/dL  --  4.41*   GLUCOSE mg/dL  --  348*   CALCIUM mg/dL  --  9.3   ALT (SGPT) U/L  --  6   AST (SGOT)  U/L  --  11   TROPONIN T ng/mL 0.166* 0.137*   PROBNP pg/mL  --  1,436.0*     Estimated Creatinine Clearance: 16.8 mL/min (A) (by C-G formula based on SCr of 4.41 mg/dL (H)).  Brief Urine Lab Results  (Last result in the past 365 days)      Color   Clarity   Blood   Leuk Est   Nitrite   Protein   CREAT   Urine HCG        09/01/19 1214 Yellow Turbid Moderate (2+) Large (3+) Negative >=300 mg/dL (3+)             Imaging Results (last 24 hours)     Procedure Component Value Units Date/Time    XR Chest 1 View [[id number]] Collected:  09/01/19 0945     Updated:  09/01/19 1357    Narrative:          EXAMINATION: XR CHEST, SINGLE VIEW - 09/01/2019     INDICATION: Chest pain.     COMPARISON: 07/22/2019     FINDINGS: Right IJ dialysis-type catheter is again seen with tip in the  right atrium. Patient is rotated to the left. Heart is borderline  enlarged. Vasculature is mildly cephalized. There is little if any  evidence to suggest interstitial edema. No lung consolidation, effusion  or pneumothorax is seen.           Impression:       Mild pulmonary vascular congestion. Minimal if any  interstitial edema. No other evidence of active chest disease is seen.     DICTATED:   09/01/2019  EDITED/ls :   09/01/2019            Results for orders placed during the hospital encounter of 09/02/17   Adult Transthoracic Echo Complete    Narrative · Left ventricular wall thickness is consistent with mild concentric   hypertrophy.  · Left atrial cavity size is mildly dilated.  · Mild mitral valve regurgitation is present  · calcification of the aortic valve  · Mild tricuspid valve regurgitation is present.          Assessment/Plan   Assessment / Plan     Active Hospital Problems    Diagnosis POA   • **Abdominal pain [R10.9] Yes     Priority: Medium   • Elevated troponin [R74.8] Yes   • Urinary tract infection without hematuria [N39.0] Yes   • ESRD (end stage renal disease) (CMS/HCC) [N18.6] Yes   • Abscess of back s/p drainage as outpatient  8/22 [L02.212] Yes   • Type 2 diabetes mellitus with chronic kidney disease on chronic dialysis, with long-term current use of insulin (CMS/[facility]) [E11.22, N18.6, Z99.2, Z79.4] Not Applicable   • UTI (urinary tract infection), bacterial [N39.0, A49.9] Yes   • Essential hypertension [I10] Yes   • Morbid obesity (CMS/[facility]) [E66.01] Yes       69-year-old female with multiple medical problems presenting with vague complaints of abdominal pain and not feeling right.  Has not missed any dialysis.  Urinalysis consistent with a UTI.  Abdominal exam is not concerning.    Her abdominal pain discomfort could be related to her UTI.  Also she has been on  unknown oral antibiotics for her recent back abscesses (data deficit) which may be contributing to her symptoms.  For now we will continue Rocephin which should treat both urine infection and the Proteus in her wounds.  I will have wound care see the patient.  Patient also has some minimally elevated cardiac enzymes which I suspect is related to her end-stage renal disease and these are stable compared to values in July.  However she is also not had a ischemic evaluation in our system.  For now we will recheck a troponin in the morning.  If it rises or she has continued nonspecific abdominal pain, could consider cardiology evaluation but will hold for now.  I have notified Dr. Cristina with nephrology and he will see her and continue dialysis while inpatient.  I will place her on basal bolus sliding scale insulin and titrate as necessary.    Sounds like Coumadin was held prior to I&D of her back abscesses and not restarted back.  See no reason not to resume that now, so I will have pharmacy dose.    Back to [facility] when medically ready, anticipate 2 to 3 days.    DVT prophylaxis:  Will start coumadin back    CODE STATUS:    Code Status and Medical Interventions:   Ordered at: 09/01/19 0971     Level Of Support Discussed With:    Patient     Code Status:    CPR     Medical  "Interventions (Level of Support Prior to Arrest):    Full       Admission Status:  I believe this patient meets inpatient criteria for treatment of UTI with IV antibiotics, further evaluation of abdominal pain that can only be done in the hospital.  Anticipate her to be here at least 2 midnights.    Electronically signed by Neel Rios MD, 09/01/19, 2:36 PM.          Electronically signed by Neel Rios MD at 9/1/2019  2:50 PM          Physician Progress Notes (last 24 hours) (Notes from 9/1/2019  9:33 AM through 9/2/2019  9:33 AM)      Irineo Latham MD at 9/2/2019  7:35 AM             LOS: 1 day    Patient Care Team:  Goldy Dior MD as PCP - General (Internal Medicine)    Chief Complaint: Gastroenteritis.  69-year-old female, ESRD dialyzed Monday Wednesday Friday through tunnel catheter, AV graft or fistula was not placed because of abscess in the back under treatment.  Patient has been on antibiotic developed nausea vomiting and diarrhea abdominal discomfort.  Patient admitted.  Subjective   Patient seen on dialysis.  Continue to have diarrhea abdominal discomfort.  Positive C. difficile infection.    Review of Systems:   Complains of abdominal discomfort, diarrhea.  Denies any shortness of breath or chest pain.    Objective     Vital Sign Min/Max for last 24 hours  Temp  Min: 96.9 °F (36.1 °C)  Max: 98.1 °F (36.7 °C)   BP  Min: 136/84  Max: 200/101   Pulse  Min: 60  Max: 102   Resp  Min: 16  Max: 20   SpO2  Min: 94 %  Max: 100 %   No Data Recorded   Weight  Min: 131 kg (288 lb)  Max: 136 kg (300 lb)     Flowsheet Rows      First Filed Value   Admission Height  162.6 cm (64\") Documented at 09/01/2019 0900   Admission Weight  136 kg (300 lb) Documented at 09/01/2019 0900          No intake/output data recorded.  I/O last 3 completed shifts:  In: 100 [IV Piggyback:100]  Out: 100 [Urine:100]    Physical Exam:  General Appearance: Alert, oriented, mild distress.  Eyes: PER, EOMI.  Neck: Supple no " JVD.  Lungs: Clear auscultation, no rales rhonchi's, equal chest movement, nonlabored.  Heart: No gallop, murmur, rub, RRR.  Abdomen: Soft, mild tenderness, positive bowel sounds, no organomegaly.  Really obese  Extremities: No edema, no cyanosis.  Neuro: No focal deficit, moving all extremities, alert oriented X 3      WBC WBC   Date Value Ref Range Status   09/02/2019 9.78 3.40 - 10.80 10*3/mm3 Final   09/01/2019 7.88 3.40 - 10.80 10*3/mm3 Final      HGB Hemoglobin   Date Value Ref Range Status   09/02/2019 9.3 (L) 12.0 - 15.9 g/dL Final   09/01/2019 10.3 (L) 12.0 - 15.9 g/dL Final      HCT Hematocrit   Date Value Ref Range Status   09/02/2019 31.5 (L) 34.0 - 46.6 % Final   09/01/2019 35.0 34.0 - 46.6 % Final      Platlets No results found for: LABPLAT   MCV MCV   Date Value Ref Range Status   09/02/2019 96.3 79.0 - 97.0 fL Final   09/01/2019 95.9 79.0 - 97.0 fL Final          Sodium Sodium   Date Value Ref Range Status   09/01/2019 134 (L) 136 - 145 mmol/L Final      Potassium Potassium   Date Value Ref Range Status   09/01/2019 4.9 3.5 - 5.2 mmol/L Final      Chloride Chloride   Date Value Ref Range Status   09/01/2019 95 (L) 98 - 107 mmol/L Final      CO2 CO2   Date Value Ref Range Status   09/01/2019 26.0 22.0 - 29.0 mmol/L Final      BUN BUN   Date Value Ref Range Status   09/01/2019 42 (H) 8 - 23 mg/dL Final      Creatinine Creatinine   Date Value Ref Range Status   09/01/2019 4.41 (H) 0.57 - 1.00 mg/dL Final      Calcium Calcium   Date Value Ref Range Status   09/01/2019 9.3 8.6 - 10.5 mg/dL Final      PO4 No results found for: CAPO4   Albumin Albumin   Date Value Ref Range Status   09/01/2019 3.60 3.50 - 5.20 g/dL Final      Magnesium No results found for: MG   Uric Acid No results found for: URICACID        Results Review:     I reviewed the patient's new clinical results.      atorvastatin 10 mg Oral Nightly   budesonide-formoterol 2 puff Inhalation BID - RT   calcitriol 0.25 mcg Oral Daily   carvedilol  25 mg Oral BID With Meals   ceftriaxone 1 g Intravenous Q24H   docusate sodium 100 mg Oral BID   insulin lispro 0-9 Units Subcutaneous 4x Daily With Meals & Nightly   isosorbide mononitrate 30 mg Oral Daily   pantoprazole 40 mg Oral Daily   polyethylene glycol 17 g Oral BID   promethazine 25 mg Oral Once per day on Mon Wed Fri   saccharomyces boulardii 250 mg Oral Daily   sertraline 50 mg Oral Daily   sevelamer 800 mg Oral TID With Meals   sodium chloride 10 mL Intravenous Q12H   warfarin 6.5 mg Oral Daily       Pharmacy to dose warfarin        Medication Review: As above seen    Assessment/Plan       Abdominal pain    Essential hypertension    Morbid obesity (CMS/[facility])    UTI (urinary tract infection), bacterial    Type 2 diabetes mellitus with chronic kidney disease on chronic dialysis, with long-term current use of insulin (CMS/[facility])    Abscess of back s/p drainage as outpatient 8/22    ESRD (end stage renal disease) (CMS/[facility])    Elevated troponin    Urinary tract infection without hematuria    1.  ESRD: Dialysis Monday Wednesday Friday.  2.  Gastroenteritis with C. difficile infection.  3.  Anemia of chronic kidney disease.  4.  Essential hypertension.  5.  Abscess of back status post drainage by Dr. Rocha.  6.  Diabetes type 2.  7.  Secondary hyperparathyroidism on calcitriol.  8.  Hyperphosphatemia secondary to ESRD on oral binders.  Plan   Dialysis Monday Wednesday Friday.  Monitor blood pressure, volume and electrolytes.  Start Procrit      Irineo Latham MD  09/02/19  7:35 AM       Electronically signed by Irineo Latham MD at 9/2/2019  7:40 AM

## 2019-09-02 NOTE — PROGRESS NOTES
"Pharmacy Consult  -  Warfarin    Joy Bueno is a  69 y.o. female   Height - 162.6 cm (64\")  Weight - 131 kg (288 lb)    Consulting Provider: - Dr. Rios  Indication: - past medical history of recent LUE DVT  Goal INR: - 2 to 3  Home Regimen:   - 6.5mg daily    Bridge Therapy: No     Drug-Drug Interactions with current regimen:  Protonix - can increase INR  Ceftriaxone - increased risk of bleeding  Zoloft - increased risk of bleeding    Warfarin Dosing During Admission:    Date  09/1 9/2          INR  1 1.06          Dose  6.5mg (6.5mg)             Education Provided: Will provide education    Discharge Follow up: Resident of [facility]   Following Provider -   Follow up time range or appointment - 3-5 days after discharge    Labs:    Results from last 7 days   Lab Units 09/02/19 0842 09/02/19 0623 09/01/19 0921   INR  1.06  --  1.00   HEMOGLOBIN g/dL  --  9.3* 10.3*   HEMATOCRIT %  --  31.5* 35.0   PLATELETS 10*3/mm3  --  257 245     Results from last 7 days   Lab Units 09/02/19 0623 09/01/19 0921   SODIUM mmol/L 137 134*   POTASSIUM mmol/L 4.2 4.9   CHLORIDE mmol/L 97* 95*   CO2 mmol/L 25.0 26.0   BUN mg/dL 48* 42*   CREATININE mg/dL 4.14* 4.41*   CALCIUM mg/dL 9.1 9.3   BILIRUBIN mg/dL  --  0.4   ALK PHOS U/L  --  113   ALT (SGPT) U/L  --  6   AST (SGOT) U/L  --  11   GLUCOSE mg/dL 248* 348*       Current dietary intake: no meals documented  Dietary Orders (From admission, onward)      Start     Ordered    09/01/19 1517  Diet Regular; Cardiac, Consistent Carbohydrate, Renal  Diet Effective Now     Question Answer Comment   Diet Texture / Consistency Regular    Common Modifiers Cardiac    Common Modifiers Consistent Carbohydrate    Common Modifiers Renal        09/01/19 1517          Assessment/Plan:   1. Pharmacy to dose Warfarin for past medical history of recent LUE DVT.   2. Patient's INR is subtherapeutic today at 1.06. Goal INR 2-3.  3. Will continue patient on current home dose of Warfarin " 6.5mg.   4. H/H - 9.3/31.5, no meals documented, no significant drug interactions currently.  5. Monitor signs/symptoms of bleeding, dietary intake, and drug-drug interactions.   6. Follow daily PT/INR and adjust dose accordingly    Pharmacy will continue to follow.     Thank you  Jose Steiner RPH  9/2/2019  3:16 PM

## 2019-09-02 NOTE — PROGRESS NOTES
[facility] Medicine Services  PROGRESS NOTE    Patient Name: Joy Bueno  : 1950  MRN: [id number]    Date of Admission: 2019  Length of Stay: 1  Primary Care Physician: Goldy Dior MD    Subjective   Subjective     CC:  abd pain     HPI:  No acute events. States she is feeling better. Upper abd pain is resolved. Lower abd pain is improved.     Review of Systems  Gen- No fevers, chills  CV- No chest pain, palpitations  Resp- No cough, dyspnea  GI- No N/V/D, abd pain    All other systems reviewed and negative except any additional pertinent positives and negatives discussed in HPI.     Objective   Objective     Vital Signs:   Temp:  [96.9 °F (36.1 °C)-98 °F (36.7 °C)] 97.8 °F (36.6 °C)  Heart Rate:  [] 73  Resp:  [17-20] 18  BP: (117-200)/() 132/71        Physical Exam:  Constitutional: No acute distress, awake, alert  HENT: NCAT, mucous membranes moist  Respiratory: + rhonchi, respiratory effort normal   Cardiovascular: RRR, no murmurs, rubs, or gallops, palpable pedal pulses bilaterally  Gastrointestinal: Positive bowel sounds, soft, mild lower abd pain   Musculoskeletal: trace bilateral ankle edema  Psychiatric: Appropriate affect, cooperative  Neurologic: Oriented x 3, strength symmetric in all extremities, Cranial Nerves grossly intact to confrontation, speech clear  Skin: No rashes    Results Reviewed:    Results from last 7 days   Lab Units 19  0842 19  0623 19  0921   WBC 10*3/mm3  --  9.78 7.88   HEMOGLOBIN g/dL  --  9.3* 10.3*   HEMATOCRIT %  --  31.5* 35.0   PLATELETS 10*3/mm3  --  257 245   INR  1.06  --  1.00     Results from last 7 days   Lab Units 19  0623 19  1146 19  0921   SODIUM mmol/L 137  --  134*   POTASSIUM mmol/L 4.2  --  4.9   CHLORIDE mmol/L 97*  --  95*   CO2 mmol/L 25.0  --  26.0   BUN mg/dL 48*  --  42*   CREATININE mg/dL 4.14*  --  4.41*   GLUCOSE mg/dL 248*  --  348*   CALCIUM mg/dL 9.1  --   9.3   ALT (SGPT) U/L  --   --  6   AST (SGOT) U/L  --   --  11   TROPONIN T ng/mL 0.125* 0.166* 0.137*   PROBNP pg/mL  --   --  1,436.0*     Estimated Creatinine Clearance: 17.5 mL/min (A) (by C-G formula based on SCr of 4.14 mg/dL (H)).    Microbiology Results Abnormal     Procedure Component Value - Date/Time    Urine Culture - Urine, Urine, Catheter [[id number]]  (Abnormal) Collected:  09/01/19 1214    Lab Status:  Preliminary result Specimen:  Urine, Catheter Updated:  09/02/19 1253     Urine Culture >100,000 CFU/mL Gram Negative Bacilli          Imaging Results (last 24 hours)     Procedure Component Value Units Date/Time    XR Chest 1 View [[id number]] Collected:  09/01/19 0945     Updated:  09/01/19 2352    Narrative:          EXAMINATION: XR CHEST, SINGLE VIEW - 09/01/2019     INDICATION: Chest pain.     COMPARISON: 07/22/2019     FINDINGS: Right IJ dialysis-type catheter is again seen with tip in the  right atrium. Patient is rotated to the left. Heart is borderline  enlarged. Vasculature is mildly cephalized. There is little if any  evidence to suggest interstitial edema. No lung consolidation, effusion  or pneumothorax is seen.           Impression:       Mild pulmonary vascular congestion. Minimal if any  interstitial edema. No other evidence of active chest disease is seen.     DICTATED:   09/01/2019  EDITED/ls :   09/01/2019      This report was finalized on 9/1/2019 11:55 PM by DR. Valentino Lo MD.             Results for orders placed during the hospital encounter of 09/02/17   Adult Transthoracic Echo Complete    Narrative · Left ventricular wall thickness is consistent with mild concentric   hypertrophy.  · Left atrial cavity size is mildly dilated.  · Mild mitral valve regurgitation is present  · calcification of the aortic valve  · Mild tricuspid valve regurgitation is present.          I have reviewed the medications:  Scheduled Meds:  atorvastatin 10 mg Oral Nightly   budesonide-formoterol 2 puff  Inhalation BID - RT   calcitriol 0.25 mcg Oral Daily   carvedilol 25 mg Oral BID With Meals   ceftriaxone 1 g Intravenous Q24H   docusate sodium 100 mg Oral BID   epoetin porter/porter-epbx 10,000 Units Subcutaneous Once per day on Mon Wed Fri   insulin lispro 0-9 Units Subcutaneous 4x Daily With Meals & Nightly   isosorbide mononitrate 30 mg Oral Daily   pantoprazole 40 mg Oral Daily   polyethylene glycol 17 g Oral BID   promethazine 25 mg Oral Once per day on Mon Wed Fri   saccharomyces boulardii 250 mg Oral Daily   sertraline 50 mg Oral Daily   sevelamer 800 mg Oral TID With Meals   sodium chloride 10 mL Intravenous Q12H   warfarin 6.5 mg Oral Daily     Continuous Infusions:  Pharmacy to dose warfarin      PRN Meds:.•  acetaminophen  •  albumin human  •  albuterol sulfate HFA  •  dextrose  •  dextrose  •  glucagon (human recombinant)  •  HYDROcodone-acetaminophen  •  ipratropium-albuterol  •  ondansetron  •  ondansetron  •  Pharmacy to dose warfarin  •  phenylephrine-mineral oil-petrolatum  •  sodium chloride  •  sodium chloride      Assessment/Plan   Assessment / Plan     Active Hospital Problems    Diagnosis  POA   • **Abdominal pain [R10.9]  Yes   • Elevated troponin [R74.8]  Yes   • Urinary tract infection without hematuria [N39.0]  Yes   • ESRD (end stage renal disease) (CMS/[facility]) [N18.6]  Yes   • Abscess of back s/p drainage as outpatient 8/22 [L02.212]  Yes   • Type 2 diabetes mellitus with chronic kidney disease on chronic dialysis, with long-term current use of insulin (CMS/[facility]) [E11.22, N18.6, Z99.2, Z79.4]  Not Applicable   • UTI (urinary tract infection), bacterial [N39.0, A49.9]  Yes   • Essential hypertension [I10]  Yes   • Morbid obesity (CMS/[facility]) [E66.01]  Yes      Resolved Hospital Problems   No resolved problems to display.        Brief Hospital Course to date:     69-year-old female with multiple medical problems presenting with vague complaints of abdominal pain and not feeling right.  Has not  missed any dialysis.  Urinalysis consistent with a UTI.  Abdominal exam is not concerning.     Acute UTI  Abd pain   Her abdominal pain discomfort could be related to her UTI.  Also she has been on  unknown oral antibiotics for her recent back abscesses (data deficit) which may be contributing to her symptoms.  Abd pain is improved     For now we will continue Rocephin which should treat both urine infection and the Proteus in her wounds.   - UCx with gram neg bacilli   - cont rocephin     Patient also has some minimally elevated cardiac enzymes   - Likely related to ESRD and diastolic heart failure   - Trop trending down  - Currently with no CP and upper abd pain is improved     ESRD  - Neph following to continue dialysis     DM   -basal bolus sliding scale insulin and titrate as necessary.     History of DVT   - Sounds like Coumadin was held prior to I&D of her back abscesses and not restarted back  - Pharmacy to dose coumadin      Back to [facility] when medically ready, anticipate 2 to 3 days.     DVT prophylaxis:  Will start coumadin back    CODE STATUS:   Code Status and Medical Interventions:   Ordered at: 09/01/19 2583     Level Of Support Discussed With:    Patient     Code Status:    CPR     Medical Interventions (Level of Support Prior to Arrest):    Full         Electronically signed by Carlita Jackson DO, 09/02/19, 2:42 PM.

## 2019-09-02 NOTE — PLAN OF CARE
Problem: Hemodialysis (Adult)  Goal: Signs and Symptoms of Listed Potential Problems Will be Absent, Minimized or Managed (Hemodialysis)   09/02/19 1006   Goal/Outcome Evaluation   Problems Assessed (Hemodialysis) all   Problems Present (Hemodialysis) fluid imbalance;electrolyte imbalance

## 2019-09-02 NOTE — PLAN OF CARE
Problem: Skin Injury Risk (Adult)  Goal: Identify Related Risk Factors and Signs and Symptoms  Outcome: Ongoing (interventions implemented as appropriate)   09/02/19 1524   Skin Injury Risk (Adult)   Related Risk Factors (Skin Injury Risk) body weight extremes;mobility impaired;moisture     Goal: Skin Health and Integrity  Outcome: Outcome(s) achieved Date Met: 09/02/19 09/02/19 1524   Skin Injury Risk (Adult)   Skin Health and Integrity making progress toward outcome       Problem: Patient Care Overview  Goal: Plan of Care Review  Outcome: Ongoing (interventions implemented as appropriate)   09/02/19 1524   Coping/Psychosocial   Plan of Care Reviewed With patient   Plan of Care Review   Progress no change   OTHER   Outcome Summary Pt a&o x4. VSS. Dialysis performed this am. Tolerated well. Pt wound care performed on abcesses on back this afternoon. Tolerated well. Pt c/o nausea this afternoon, but resolved on its own. C/o itching in right foot, lotion applied and resolved per pt. Appears to be asleep at this time and resting comfortably. Will continue to monitor        Problem: Hemodialysis (Adult)  Goal: Signs and Symptoms of Listed Potential Problems Will be Absent, Minimized or Managed (Hemodialysis)  Outcome: Ongoing (interventions implemented as appropriate)   09/02/19 1524   Goal/Outcome Evaluation   Problems Assessed (Hemodialysis) all   Problems Present (Hemodialysis) nausea and vomiting;situational response

## 2019-09-02 NOTE — PROGRESS NOTES
"   LOS: 1 day    Patient Care Team:  Goldy Dior MD as PCP - General (Internal Medicine)    Chief Complaint: Gastroenteritis.  69-year-old female, ESRD dialyzed Monday Wednesday Friday through tunnel catheter, AV graft or fistula was not placed because of abscess in the back under treatment.  Patient has been on antibiotic developed nausea vomiting and diarrhea abdominal discomfort.  Patient admitted.  Subjective   Patient seen on dialysis.  Continue to have diarrhea abdominal discomfort.  Positive C. difficile infection.    Review of Systems:   Complains of abdominal discomfort, diarrhea.  Denies any shortness of breath or chest pain.    Objective     Vital Sign Min/Max for last 24 hours  Temp  Min: 96.9 °F (36.1 °C)  Max: 98.1 °F (36.7 °C)   BP  Min: 136/84  Max: 200/101   Pulse  Min: 60  Max: 102   Resp  Min: 16  Max: 20   SpO2  Min: 94 %  Max: 100 %   No Data Recorded   Weight  Min: 131 kg (288 lb)  Max: 136 kg (300 lb)     Flowsheet Rows      First Filed Value   Admission Height  162.6 cm (64\") Documented at 09/01/2019 0900   Admission Weight  136 kg (300 lb) Documented at 09/01/2019 0900          No intake/output data recorded.  I/O last 3 completed shifts:  In: 100 [IV Piggyback:100]  Out: 100 [Urine:100]    Physical Exam:  General Appearance: Alert, oriented, mild distress.  Eyes: PER, EOMI.  Neck: Supple no JVD.  Lungs: Clear auscultation, no rales rhonchi's, equal chest movement, nonlabored.  Heart: No gallop, murmur, rub, RRR.  Abdomen: Soft, mild tenderness, positive bowel sounds, no organomegaly.  Really obese  Extremities: No edema, no cyanosis.  Neuro: No focal deficit, moving all extremities, alert oriented X 3      WBC WBC   Date Value Ref Range Status   09/02/2019 9.78 3.40 - 10.80 10*3/mm3 Final   09/01/2019 7.88 3.40 - 10.80 10*3/mm3 Final      HGB Hemoglobin   Date Value Ref Range Status   09/02/2019 9.3 (L) 12.0 - 15.9 g/dL Final   09/01/2019 10.3 (L) 12.0 - 15.9 g/dL Final      HCT " Hematocrit   Date Value Ref Range Status   09/02/2019 31.5 (L) 34.0 - 46.6 % Final   09/01/2019 35.0 34.0 - 46.6 % Final      Platlets No results found for: LABPLAT   MCV MCV   Date Value Ref Range Status   09/02/2019 96.3 79.0 - 97.0 fL Final   09/01/2019 95.9 79.0 - 97.0 fL Final          Sodium Sodium   Date Value Ref Range Status   09/01/2019 134 (L) 136 - 145 mmol/L Final      Potassium Potassium   Date Value Ref Range Status   09/01/2019 4.9 3.5 - 5.2 mmol/L Final      Chloride Chloride   Date Value Ref Range Status   09/01/2019 95 (L) 98 - 107 mmol/L Final      CO2 CO2   Date Value Ref Range Status   09/01/2019 26.0 22.0 - 29.0 mmol/L Final      BUN BUN   Date Value Ref Range Status   09/01/2019 42 (H) 8 - 23 mg/dL Final      Creatinine Creatinine   Date Value Ref Range Status   09/01/2019 4.41 (H) 0.57 - 1.00 mg/dL Final      Calcium Calcium   Date Value Ref Range Status   09/01/2019 9.3 8.6 - 10.5 mg/dL Final      PO4 No results found for: CAPO4   Albumin Albumin   Date Value Ref Range Status   09/01/2019 3.60 3.50 - 5.20 g/dL Final      Magnesium No results found for: MG   Uric Acid No results found for: URICACID        Results Review:     I reviewed the patient's new clinical results.      atorvastatin 10 mg Oral Nightly   budesonide-formoterol 2 puff Inhalation BID - RT   calcitriol 0.25 mcg Oral Daily   carvedilol 25 mg Oral BID With Meals   ceftriaxone 1 g Intravenous Q24H   docusate sodium 100 mg Oral BID   insulin lispro 0-9 Units Subcutaneous 4x Daily With Meals & Nightly   isosorbide mononitrate 30 mg Oral Daily   pantoprazole 40 mg Oral Daily   polyethylene glycol 17 g Oral BID   promethazine 25 mg Oral Once per day on Mon Wed Fri   saccharomyces boulardii 250 mg Oral Daily   sertraline 50 mg Oral Daily   sevelamer 800 mg Oral TID With Meals   sodium chloride 10 mL Intravenous Q12H   warfarin 6.5 mg Oral Daily       Pharmacy to dose warfarin        Medication Review: As above  seen    Assessment/Plan       Abdominal pain    Essential hypertension    Morbid obesity (CMS/[facility])    UTI (urinary tract infection), bacterial    Type 2 diabetes mellitus with chronic kidney disease on chronic dialysis, with long-term current use of insulin (CMS/[facility])    Abscess of back s/p drainage as outpatient 8/22    ESRD (end stage renal disease) (CMS/[facility])    Elevated troponin    Urinary tract infection without hematuria    1.  ESRD: Dialysis Monday Wednesday Friday.  2.  Gastroenteritis with C. difficile infection.  3.  Anemia of chronic kidney disease.  4.  Essential hypertension.  5.  Abscess of back status post drainage by Dr. Rocha.  6.  Diabetes type 2.  7.  Secondary hyperparathyroidism on calcitriol.  8.  Hyperphosphatemia secondary to ESRD on oral binders.  Plan   Dialysis Monday Wednesday Friday.  Monitor blood pressure, volume and electrolytes.  Start Procrit      Irineo Latham MD  09/02/19  7:35 AM

## 2019-09-02 NOTE — PROGRESS NOTES
Discharge Planning Assessment  [facility]     Patient Name: Joy Bueno  MRN: [id number]  Today's Date: 9/2/2019    Admit Date: 9/1/2019    Discharge Needs Assessment     Row Name 09/02/19 0934       Living Environment    Lives With  facility resident    Current Living Arrangements  extended care facility    Primary Care Provided by  other (see comments) FACILITY STAFF    Provides Primary Care For  no one, unable/limited ability to care for self    Family Caregiver if Needed  none    Able to Return to Prior Arrangements  yes    Living Arrangement Comments  [facility]       Transition Planning    Patient/Family Anticipates Transition to  other (see comments) ICF    Patient/Family Anticipated Services at Transition  skilled nursing    Transportation Anticipated  public transportation [employer]       Discharge Needs Assessment    Readmission Within the Last 30 Days  no previous admission in last 30 days    Concerns to be Addressed  discharge planning    Equipment Currently Used at Home  wheelchair    Anticipated Changes Related to Illness  none    Equipment Needed After Discharge  none    Outpatient/Agency/Support Group Needs  outpatient hemodialysis    Discharge Facility/Level of Care Needs  nursing facility, intermediate    Current Discharge Risk  chronically ill;dependent with mobility/activities of daily living        Discharge Plan     Row Name 09/02/19 0936       Plan    Plan  [facility]    Patient/Family in Agreement with Plan  yes    Plan Comments  Met with pt at bedside.  She resides in an ICF at [facility].  She is dependent with most ADLs.  She is mobile with a WC.  She currently dialyzes MWF at [facility]  She uses [employer] to transport.  Confirmed she has Medicare and [place] Medicaid.  LONNIE spoke with Keshawn at [facility] and confirmed that pt can return to the facility when medically ready.  LONNIE faxed clinical update today.  Will assist with transportation on day of DC.  LONNIE  will cont to follow.    Final Discharge Disposition Code  04 - intermediate care facility        Destination      Service Provider Request Status Selected Services Address Phone Number Fax Number    [facility] Accepted N/A [address], [facility] [address] [phone]      Durable Medical Equipment      No service coordination in this encounter.      Dialysis/Infusion - Selection Complete      Service Provider Request Status Selected Services Address Phone Number Fax Number    [facility] Selected Dialysis [address], [facility] [address] [phone]      Home Medical Care      No service coordination in this encounter.      Therapy      No service coordination in this encounter.      Community Resources      No service coordination in this encounter.          Demographic Summary     Row Name 09/02/19 0934       General Information    Referral Source  admission list    Reason for Consult  discharge planning    Preferred Language  English       Contact Information    Permission Granted to Share Info With      Contact Information Obtained for          Functional Status     Row Name 09/02/19 0934       Functional Status    Usual Activity Tolerance  poor       Functional Status, IADL    Medications  completely dependent    Meal Preparation  completely dependent    Housekeeping  completely dependent    Laundry  completely dependent    Shopping  completely dependent        Psychosocial    No documentation.       Abuse/Neglect    No documentation.       Legal    No documentation.       Substance Abuse    No documentation.       Patient Forms    No documentation.           Eloisa Kim, RN

## 2019-09-03 VITALS
TEMPERATURE: 98 F | SYSTOLIC BLOOD PRESSURE: 156 MMHG | RESPIRATION RATE: 16 BRPM | WEIGHT: 287.38 LBS | BODY MASS INDEX: 49.06 KG/M2 | HEART RATE: 72 BPM | HEIGHT: 64 IN | DIASTOLIC BLOOD PRESSURE: 69 MMHG | OXYGEN SATURATION: 97 %

## 2019-09-03 PROBLEM — R10.9 ABDOMINAL PAIN: Status: RESOLVED | Noted: 2017-11-04 | Resolved: 2019-09-03

## 2019-09-03 LAB
ALBUMIN SERPL-MCNC: 3.2 G/DL (ref 3.5–5.2)
ANION GAP SERPL CALCULATED.3IONS-SCNC: 14 MMOL/L (ref 5–15)
BACTERIA SPEC AEROBE CULT: ABNORMAL
BASOPHILS # BLD AUTO: 0.08 10*3/MM3 (ref 0–0.2)
BASOPHILS NFR BLD AUTO: 0.7 % (ref 0–1.5)
BUN BLD-MCNC: 40 MG/DL (ref 8–23)
BUN/CREAT SERPL: 9.6 (ref 7–25)
CALCIUM SPEC-SCNC: 8.8 MG/DL (ref 8.6–10.5)
CHLORIDE SERPL-SCNC: 97 MMOL/L (ref 98–107)
CO2 SERPL-SCNC: 23 MMOL/L (ref 22–29)
CREAT BLD-MCNC: 4.15 MG/DL (ref 0.57–1)
DEPRECATED RDW RBC AUTO: 61.5 FL (ref 37–54)
EOSINOPHIL # BLD AUTO: 0.24 10*3/MM3 (ref 0–0.4)
EOSINOPHIL NFR BLD AUTO: 2.2 % (ref 0.3–6.2)
ERYTHROCYTE [DISTWIDTH] IN BLOOD BY AUTOMATED COUNT: 17.1 % (ref 12.3–15.4)
GFR SERPL CREATININE-BSD FRML MDRD: 13 ML/MIN/1.73
GFR SERPL CREATININE-BSD FRML MDRD: ABNORMAL ML/MIN/{1.73_M2}
GLUCOSE BLD-MCNC: 332 MG/DL (ref 65–99)
GLUCOSE BLDC GLUCOMTR-MCNC: 278 MG/DL (ref 70–130)
GLUCOSE BLDC GLUCOMTR-MCNC: 301 MG/DL (ref 70–130)
GLUCOSE BLDC GLUCOMTR-MCNC: 360 MG/DL (ref 70–130)
HCT VFR BLD AUTO: 31 % (ref 34–46.6)
HGB BLD-MCNC: 8.9 G/DL (ref 12–15.9)
IMM GRANULOCYTES # BLD AUTO: 0.15 10*3/MM3 (ref 0–0.05)
IMM GRANULOCYTES NFR BLD AUTO: 1.4 % (ref 0–0.5)
INR PPP: 1.19 (ref 0.85–1.16)
LYMPHOCYTES # BLD AUTO: 2.01 10*3/MM3 (ref 0.7–3.1)
LYMPHOCYTES NFR BLD AUTO: 18.4 % (ref 19.6–45.3)
MCH RBC QN AUTO: 28 PG (ref 26.6–33)
MCHC RBC AUTO-ENTMCNC: 28.7 G/DL (ref 31.5–35.7)
MCV RBC AUTO: 97.5 FL (ref 79–97)
MONOCYTES # BLD AUTO: 0.87 10*3/MM3 (ref 0.1–0.9)
MONOCYTES NFR BLD AUTO: 8 % (ref 5–12)
NEUTROPHILS # BLD AUTO: 7.57 10*3/MM3 (ref 1.7–7)
NEUTROPHILS NFR BLD AUTO: 69.3 % (ref 42.7–76)
NRBC BLD AUTO-RTO: 0.2 /100 WBC (ref 0–0.2)
PHOSPHATE SERPL-MCNC: 4.4 MG/DL (ref 2.5–4.5)
PLATELET # BLD AUTO: 236 10*3/MM3 (ref 140–450)
PMV BLD AUTO: 11 FL (ref 6–12)
POTASSIUM BLD-SCNC: 4.7 MMOL/L (ref 3.5–5.2)
PROTHROMBIN TIME: 14.5 SECONDS (ref 11.2–14.3)
RBC # BLD AUTO: 3.18 10*6/MM3 (ref 3.77–5.28)
SODIUM BLD-SCNC: 134 MMOL/L (ref 136–145)
WBC NRBC COR # BLD: 10.92 10*3/MM3 (ref 3.4–10.8)

## 2019-09-03 PROCEDURE — 85025 COMPLETE CBC W/AUTO DIFF WBC: CPT | Performed by: INTERNAL MEDICINE

## 2019-09-03 PROCEDURE — 99239 HOSP IP/OBS DSCHRG MGMT >30: CPT | Performed by: INTERNAL MEDICINE

## 2019-09-03 PROCEDURE — 85610 PROTHROMBIN TIME: CPT

## 2019-09-03 PROCEDURE — 94799 UNLISTED PULMONARY SVC/PX: CPT

## 2019-09-03 PROCEDURE — 82962 GLUCOSE BLOOD TEST: CPT

## 2019-09-03 PROCEDURE — 80069 RENAL FUNCTION PANEL: CPT | Performed by: INTERNAL MEDICINE

## 2019-09-03 RX ORDER — CEFUROXIME AXETIL 500 MG/1
500 TABLET ORAL EVERY 12 HOURS SCHEDULED
Qty: 10 TABLET | Refills: 0 | Status: SHIPPED | OUTPATIENT
Start: 2019-09-03 | End: 2019-09-08

## 2019-09-03 RX ORDER — CEFUROXIME AXETIL 250 MG/1
500 TABLET ORAL EVERY 12 HOURS SCHEDULED
Status: DISCONTINUED | OUTPATIENT
Start: 2019-09-03 | End: 2019-09-03 | Stop reason: HOSPADM

## 2019-09-03 RX ADMIN — CARVEDILOL 25 MG: 12.5 TABLET, FILM COATED ORAL at 08:29

## 2019-09-03 RX ADMIN — BUDESONIDE AND FORMOTEROL FUMARATE DIHYDRATE 2 PUFF: 160; 4.5 AEROSOL RESPIRATORY (INHALATION) at 08:51

## 2019-09-03 RX ADMIN — CARVEDILOL 25 MG: 12.5 TABLET, FILM COATED ORAL at 16:50

## 2019-09-03 RX ADMIN — Medication 250 MG: at 08:30

## 2019-09-03 RX ADMIN — CALCITRIOL 0.25 MCG: 0.25 CAPSULE ORAL at 16:50

## 2019-09-03 RX ADMIN — SERTRALINE HYDROCHLORIDE 50 MG: 50 TABLET ORAL at 08:30

## 2019-09-03 RX ADMIN — SEVELAMER CARBONATE 0.8 G: 0.8 POWDER, FOR SUSPENSION ORAL at 11:28

## 2019-09-03 RX ADMIN — ISOSORBIDE MONONITRATE 30 MG: 30 TABLET, EXTENDED RELEASE ORAL at 08:30

## 2019-09-03 RX ADMIN — INSULIN LISPRO 6 UNITS: 100 INJECTION, SOLUTION INTRAVENOUS; SUBCUTANEOUS at 12:15

## 2019-09-03 RX ADMIN — PANTOPRAZOLE SODIUM 40 MG: 40 TABLET, DELAYED RELEASE ORAL at 08:30

## 2019-09-03 RX ADMIN — INSULIN LISPRO 8 UNITS: 100 INJECTION, SOLUTION INTRAVENOUS; SUBCUTANEOUS at 16:52

## 2019-09-03 RX ADMIN — DIPHENHYDRAMINE HYDROCHLORIDE, ZINC ACETATE: 2; .1 CREAM TOPICAL at 02:05

## 2019-09-03 RX ADMIN — INSULIN LISPRO 7 UNITS: 100 INJECTION, SOLUTION INTRAVENOUS; SUBCUTANEOUS at 08:29

## 2019-09-03 RX ADMIN — DOCUSATE SODIUM 100 MG: 100 CAPSULE, LIQUID FILLED ORAL at 08:30

## 2019-09-03 RX ADMIN — WARFARIN SODIUM 6.5 MG: 2.5 TABLET ORAL at 16:50

## 2019-09-03 NOTE — NURSING NOTE
Transfer summary faxed to [facility]. Isis from facility called to verify patient does NOT have C diff. A note was faxed yesterday from the Nephrologist staing positive C diff infection. A positive infection was present however it was in 2017. I verified this and faxed the results to [facility]. Awaiting to hear mode of transportation for transfer from [facility].

## 2019-09-03 NOTE — DISCHARGE PLACEMENT REQUEST
"Joy Bueno (69 y.o. Female)     From [place],   [phone]      Date of Birth Social Security Number Address Home Phone MRN    1950  [address] [id number] [phone] [id number]    Alevism Marital Status          None        Admission Date Admission Type Admitting Provider Attending Provider Department, Room/Bed    9/1/19 Emergency Carlita Jackson DO Roesch, Lyndsey, DO [facility] 5G, S559/1    Discharge Date Discharge Disposition Discharge Destination         Skilled Nursing Facility (DC - External)              Attending Provider:  Carlita Jackson DO    Allergies:  Geodon [Ziprasidone Hcl], Haldol [Haloperidol Lactate], Seroquel [Quetiapine Fumarate]    Isolation:  None   Infection:  None   Code Status:  CPR    Ht:  162.6 cm (64\")   Wt:  130 kg (287 lb 6 oz)    Admission Cmt:  None   Principal Problem:  Abdominal pain [R10.9]                 Active Insurance as of 9/1/2019     Primary Coverage     Payor Plan Insurance Group Employer/Plan Group    MEDICARE MEDICARE A & B      Payor Plan Address Payor Plan Phone Number Payor Plan Fax Number Effective Dates    [address] [phone]  5/1/2016 - None Entered    [facility] [id number]       Subscriber Name Subscriber Birth Date Member ID       JOY BUENO 1950 [id number]           Secondary Coverage     Payor Plan Insurance Group Employer/Plan Group    [place]      Payor Plan Address Payor Plan Phone Number Payor Plan Fax Number Effective Dates    [address] [phone]  8/2/2018 - None Entered    [facility] [id number]       Subscriber Name Subscriber Birth Date Member ID       JOY BUENO 1950 [id number]                 Emergency Contacts      (Rel.) Home Phone Work Phone Mobile Phone    Tessie Dorado (Daughter) [phone] -- [phone]                 Discharge Summary      Carlita Jackson DO at 9/3/2019 12:57 PM  "             [facility] Medicine Services  DISCHARGE SUMMARY    Patient Name: Joy Bueno  : 1950  MRN: [id number]    Date of Admission: 2019  Date of Discharge:  19  Primary Care Physician: Goldy Dior MD    Consults     Date and Time Order Name Status Description    2019 1518 Inpatient Nephrology Consult Completed           Hospital Course     Presenting Problem:   Urinary tract infection without hematuria, site unspecified [N39.0]    Active Hospital Problems    Diagnosis  POA   • Elevated troponin [R74.8]  Yes   • Urinary tract infection without hematuria [N39.0]  Yes   • ESRD (end stage renal disease) (CMS/[facility]) [N18.6]  Yes   • Abscess of back s/p drainage as outpatient  [L02.212]  Yes   • Type 2 diabetes mellitus with chronic kidney disease on chronic dialysis, with long-term current use of insulin (CMS/[facility]) [E11.22, N18.6, Z99.2, Z79.4]  Not Applicable   • UTI (urinary tract infection), bacterial [N39.0, A49.9]  Yes   • Essential hypertension [I10]  Yes   • Morbid obesity (CMS/[facility]) [E66.01]  Yes      Resolved Hospital Problems    Diagnosis Date Resolved POA   • **Abdominal pain [R10.9] 2019 Yes          Hospital Course:  69-year-old female with multiple medical problems presenting with vague complaints of abdominal pain and not feeling right.  Has not missed any dialysis.  Urinalysis consistent with a UTI.       Acute UTI  Abd pain   Her abdominal pain discomfort was likely related to UTI because improved with treatment. Pain has improved on 2nd day of admission and resolved on day of discharge   - UCx with e.coli. She was on rocephin during admission but will discharge on ceftin 500 mg BID for 5 additional days      Minimally elevated cardiac enzymes   - Likely related to ESRD and diastolic heart failure   - Trop trending down and she remained pain free       ESRD  - nephrology was consulted and she was maintained on her regular dialysis  schedule      DM   -basal bolus sliding scale insulin and titrate as necessary.  - Recommend continuing to follow at [facility]      History of DVT   - Sounds like Coumadin was held prior to I&D of her back abscesses and not restarted back  - INR 1.19 on day of discharge. She was resumed back on her home 6.5 mg daily.     Discharge Follow Up Recommendations for labs/diagnostics:  PCP 1 week  Facility provider in 2 days with repeat INR check   Continue dialysis schedule M, W, F     Day of Discharge     HPI:   No acute events. States her abd pain is resolved. Overall feeling much better. No further epigastric pain. States she feels comfortable and ready for discharge.     Review of Systems  Gen- No fevers, chills  CV- No chest pain, palpitations  Resp- No cough, dyspnea  GI- No N/V/D, abd pain    All other systems reviewed and negative except any additional pertinent positives and negatives discussed in HPI.     Vital Signs:   Temp:  [97.4 °F (36.3 °C)-98 °F (36.7 °C)] 98 °F (36.7 °C)  Heart Rate:  [69-80] 72  Resp:  [16-20] 16  BP: (116-156)/(56-69) 156/69     Physical Exam:  Constitutional: No acute distress, awake, alert; obese   HENT: NCAT, mucous membranes moist  Respiratory: Clear to auscultation bilaterally, respiratory effort normal   Cardiovascular: RRR, II/VI murmurs, no rubs, or gallops, palpable pedal pulses bilaterally  Gastrointestinal: Positive bowel sounds, soft, nontender, nondistended  Musculoskeletal: trace bilateral ankle edema  Psychiatric: Appropriate affect, cooperative  Neurologic: Oriented x 3, strength symmetric in all extremities, Cranial Nerves grossly intact to confrontation, speech clear  Skin: No rashes      Pertinent  and/or Most Recent Results     Results from last 7 days   Lab Units 09/03/19  0518 09/02/19  0623 09/01/19  0921   WBC 10*3/mm3 10.92* 9.78 7.88   HEMOGLOBIN g/dL 8.9* 9.3* 10.3*   HEMATOCRIT % 31.0* 31.5* 35.0   PLATELETS 10*3/mm3 236 257 245   SODIUM mmol/L 134* 137  134*   POTASSIUM mmol/L 4.7 4.2 4.9   CHLORIDE mmol/L 97* 97* 95*   CO2 mmol/L 23.0 25.0 26.0   BUN mg/dL 40* 48* 42*   CREATININE mg/dL 4.15* 4.14* 4.41*   GLUCOSE mg/dL 332* 248* 348*   CALCIUM mg/dL 8.8 9.1 9.3     Results from last 7 days   Lab Units 09/03/19  0518 09/02/19  0842 09/01/19  0921   BILIRUBIN mg/dL  --   --  0.4   ALK PHOS U/L  --   --  113   ALT (SGPT) U/L  --   --  6   AST (SGOT) U/L  --   --  11   PROTIME Seconds 14.5* 13.3 12.7   INR  1.19* 1.06 1.00           Invalid input(s): TG, LDLCALC, LDLREALC  Results from last 7 days   Lab Units 09/02/19  0623 09/01/19  1146 09/01/19  0921   PROBNP pg/mL  --   --  1,436.0*   TROPONIN T ng/mL 0.125* 0.166* 0.137*       Brief Urine Lab Results  (Last result in the past 365 days)      Color   Clarity   Blood   Leuk Est   Nitrite   Protein   CREAT   Urine HCG        09/01/19 1214 Yellow Turbid Moderate (2+) Large (3+) Negative >=300 mg/dL (3+)               Microbiology Results Abnormal     Procedure Component Value - Date/Time    Urine Culture - Urine, Urine, Catheter [[id number]]  (Abnormal)  (Susceptibility) Collected:  09/01/19 1214    Lab Status:  Final result Specimen:  Urine, Catheter Updated:  09/03/19 1008     Urine Culture >100,000 CFU/mL Escherichia coli    Susceptibility      Escherichia coli     AL     Ampicillin Resistant     Ampicillin + Sulbactam Resistant     Cefazolin Susceptible     Cefepime Susceptible     Ceftazidime Susceptible     Ceftriaxone Susceptible     Gentamicin Resistant     Levofloxacin Susceptible     Nitrofurantoin Susceptible     Piperacillin + Tazobactam Intermediate     Tetracycline Resistant     Trimethoprim + Sulfamethoxazole Resistant                          Imaging Results (all)     Procedure Component Value Units Date/Time    XR Chest 1 View [[id number]] Collected:  09/01/19 0945     Updated:  09/01/19 6014    Narrative:          EXAMINATION: XR CHEST, SINGLE VIEW - 09/01/2019     INDICATION: Chest pain.      COMPARISON: 07/22/2019     FINDINGS: Right IJ dialysis-type catheter is again seen with tip in the  right atrium. Patient is rotated to the left. Heart is borderline  enlarged. Vasculature is mildly cephalized. There is little if any  evidence to suggest interstitial edema. No lung consolidation, effusion  or pneumothorax is seen.           Impression:       Mild pulmonary vascular congestion. Minimal if any  interstitial edema. No other evidence of active chest disease is seen.     DICTATED:   09/01/2019  EDITED/ls :   09/01/2019      This report was finalized on 9/1/2019 11:55 PM by DR. Valentino Lo MD.             Results for orders placed during the hospital encounter of 02/11/19   Duplex Venous Upper Extremity - Left CAR    Narrative · There is evidence of deep venous thrombosis in the mid and distal left   brachial vein  · There is evidence of superficial venous thrombosis involving the left   basilic vein in the forearm.          Results for orders placed during the hospital encounter of 02/11/19   Duplex Venous Upper Extremity - Left CAR    Narrative · There is evidence of deep venous thrombosis in the mid and distal left   brachial vein  · There is evidence of superficial venous thrombosis involving the left   basilic vein in the forearm.          Results for orders placed during the hospital encounter of 09/02/17   Adult Transthoracic Echo Complete    Narrative · Left ventricular wall thickness is consistent with mild concentric   hypertrophy.  · Left atrial cavity size is mildly dilated.  · Mild mitral valve regurgitation is present  · calcification of the aortic valve  · Mild tricuspid valve regurgitation is present.            Discharge Details        Discharge Medications      New Medications      Instructions Start Date   cefuroxime 500 MG tablet  Commonly known as:  CEFTIN   500 mg, Oral, Every 12 Hours Scheduled         Continue These Medications      Instructions Start Date   acetaminophen 325 MG  tablet  Commonly known as:  TYLENOL   650 mg, Oral, Every 6 Hours PRN      albuterol sulfate  (90 Base) MCG/ACT inhaler  Commonly known as:  PROVENTIL HFA;VENTOLIN HFA;PROAIR HFA   2 puffs, Inhalation, Every 4 Hours PRN      atorvastatin 10 MG tablet  Commonly known as:  LIPITOR   10 mg, Oral, Nightly      calcitriol 0.25 MCG capsule  Commonly known as:  ROCALTROL   0.25 mcg, Oral, Daily      carvedilol 25 MG tablet  Commonly known as:  COREG   25 mg, Oral, 2 Times Daily With Meals      diltiaZEM (CARDIZEM) 2% cream   Topical, 3 Times Daily PRN      docusate sodium 100 MG capsule  Commonly known as:  COLACE   100 mg, Oral, 2 Times Daily      fluticasone-salmeterol 500-50 MCG/DOSE DISKUS  Commonly known as:  ADVAIR   1 puff, Inhalation, 2 Times Daily - RT      HYDROcodone-acetaminophen 5-325 MG per tablet  Commonly known as:  NORCO   1 tablet, Oral, Every 8 Hours PRN      insulin aspart 100 UNIT/ML solution pen-injector sc pen  Commonly known as:  novoLOG FLEXPEN   5 Units, Subcutaneous, 3 Times Daily With Meals      ipratropium-albuterol 0.5-2.5 mg/3 ml nebulizer  Commonly known as:  DUO-NEB   3 mL, Nebulization, Every 6 Hours PRN      isosorbide mononitrate 30 MG 24 hr tablet  Commonly known as:  IMDUR   30 mg, Oral, Daily      Lidocaine 2 % gel   Apply externally, 3 Times Daily PRN      melatonin 3 MG tablet   3 mg, Oral, Nightly      ondansetron 4 MG tablet  Commonly known as:  ZOFRAN   4 mg, Oral, Every 8 Hours PRN      pantoprazole 40 MG EC tablet  Commonly known as:  PROTONIX   40 mg, Oral, Daily      polyethylene glycol pack packet  Commonly known as:  MIRALAX   17 g, Oral, 2 Times Daily      PREPARATION H 0.25-88.44 % suppository suppository  Generic drug:  phenylephrine-cocoa Butter   Rectal, As Needed      promethazine 25 MG tablet  Commonly known as:  PHENERGAN   25 mg, Oral, 3 Times Weekly, On dialysis days (Mon Weds Fri)       saccharomyces boulardii 250 MG capsule  Commonly known as:   FLORASTOR   250 mg, Oral, Daily      sertraline 50 MG tablet  Commonly known as:  ZOLOFT   50 mg, Oral, Daily      sevelamer 800 MG tablet  Commonly known as:  RENVELA   800 mg, Oral, 3 Times Daily With Meals, 0800, 1200 & 1700      TAB-A-STAR tablet   1 tablet, Oral, Daily      vitamin D 11484 units capsule capsule  Commonly known as:  ERGOCALCIFEROL   50,000 Units, Oral, Weekly, TAKES ON WEDNESDAYS       warfarin 4 MG tablet  Commonly known as:  COUMADIN   4 mg, Oral, Daily Warfarin, Take with 2.5mg for a total of 6.5mg       warfarin 2.5 MG tablet  Commonly known as:  COUMADIN   2.5 mg, Oral, Daily Warfarin, Take with 4mg for a total of 6.5mg              Allergies   Allergen Reactions   • Geodon [Ziprasidone Hcl] Unknown (See Comments)     PT DOESN'T REMEMBER   • Haldol [Haloperidol Lactate] Unknown (See Comments)     PT DOESN'T REMEMBER   • Seroquel [Quetiapine Fumarate] Unknown (See Comments)     PT DOESN'T REMEMBER         Discharge Disposition:  Skilled Nursing Facility (DC - External)    Discharge Diet:  Diet Order   Procedures   • Diet Regular; Cardiac, Consistent Carbohydrate, Renal         Discharge Activity:   Activity Instructions     Activity as Tolerated              CODE STATUS:    Code Status and Medical Interventions:   Ordered at: 09/01/19 1435     Level Of Support Discussed With:    Patient     Code Status:    CPR     Medical Interventions (Level of Support Prior to Arrest):    Full         No future appointments.    Additional Instructions for the Follow-ups that You Need to Schedule     Discharge Follow-up with PCP   As directed       Currently Documented PCP:    Goldy Dior MD    PCP Phone Number:    [phone]     Follow Up Details:  PCP 1 week         Discharge Follow-up with Specified Provider: Dialysis as scheduled Monday, Wednesday, Friday   As directed      To:  Dialysis as scheduled Monday, Wednesday, Friday         Discharge Follow-up with Specified Provider: Facility  provider and INR check in 2 days   As directed      To:  Facility provider and INR check in 2 days               Time Spent on Discharge:  35 minutes    Electronically signed by Carlita Feliciano DO, 09/03/19, 12:57 PM.        Electronically signed by Carlita Feliciano DO at 9/3/2019  1:10 PM       Discharge Order (From admission, onward)    Start     Ordered    09/03/19 1254  Discharge patient  Once     Expected Discharge Date:  09/03/19    Discharge Disposition:  Skilled Nursing Facility (DC - External)    Physician of Record for Attribution - Please select from Treatment Team:  CARLITA FELICIANO [[id number]]    Review needed by CMO to determine Physician of Record:  No       Question Answer Comment   Physician of Record for Attribution - Please select from Treatment Team CARLITA FELICIANO    Review needed by CMO to determine Physician of Record No        09/03/19 1257

## 2019-09-03 NOTE — PLAN OF CARE
Problem: Patient Care Overview  Goal: Plan of Care Review  Outcome: Ongoing (interventions implemented as appropriate)   09/03/19 0200 09/03/19 0442   Coping/Psychosocial   Plan of Care Reviewed With patient --    Plan of Care Review   Progress --  no change   OTHER   Outcome Summary --  VSS. Pt sleepy, easy to arouse. No complaints of nausea or pain, pt tolerated dinner diet. C/O itchyness, resolved with PRN medication. No concerns voiced. Will continue to monitor.

## 2019-09-03 NOTE — PROGRESS NOTES
Continued Stay Note  [facility]     Patient Name: Joy Bueno  MRN: [id number]  Today's Date: 9/3/2019    Admit Date: 9/1/2019    Discharge Plan     Row Name 09/03/19 1429       Plan    Plan  WHEELS to tranport home at 1800    Patient/Family in Agreement with Plan  yes    Plan Comments  SW was notified that pt needed WHEELS arranged for transport back to [place] today.  SW called WHEELS at [phone] to arrange ride. Pt MUST be at the 1700/maternity lobby entrance by 6:00pm for WHEELS to transport back to [place].  Pt will be in her personal wheelchair for transit.  CM and staff RN are aware of plan.        Discharge Codes    No documentation.       Expected Discharge Date and Time     Expected Discharge Date Expected Discharge Time    Sep 3, 2019             ROGELIO Becker

## 2019-09-03 NOTE — PROGRESS NOTES
Case Management Discharge Note    Final Note: Per VIVIAN Villaseñor at [facility], patient can return to the facility today. Patient will transport via Wheels. LONNIE has made arrangements to  patient's personal WC for transport. Please have patient downstairs at the Maternity entrance in her own WC by 1800. Wheels will arrive between [phone]. [facility] has already received the DC summary, and RN, Rene, has called report to [phone]. Please send a copy of the DC summary/AVS and any hard scripts for controlled substances in the discharge packet. Thank you. LONNIE has notified Sujatha at [facility] of patient's DC and faxed the DC summary as well, ph. [phone], fax [phone].     Destination - Selection Complete      Service Provider Request Status Selected Services Address Phone Number Fax Number    [facility] Selected Intermediate Care [address] [address] [phone]      Durable Medical Equipment      No service has been selected for the patient.      Dialysis/Infusion - Selection Complete      Service Provider Request Status Selected Services Address Phone Number Fax Number    [facility] Selected Dialysis [address], [facility] [id number] [phone]      Home Medical Care      No service has been selected for the patient.      Therapy      No service has been selected for the patient.      Community Resources      No service has been selected for the patient.        Transportation Services  W/C Van: Other([employer])    Final Discharge Disposition Code: 04 - intermediate care facility

## 2019-09-03 NOTE — PROGRESS NOTES
"Pharmacy Consult - Warfarin    Joy Bueno is a 69 y.o. female on warfarin therapy.    Height - 162.6 cm (64\")  Weight - 130 kg (287 lb 6 oz)    Consulting Provider: Dr. Rios  Indication: recent LUE DVT  Goal INR: 2-3  Home Regimen:   -Warfarin 6.5mg daily    Bridge Therapy: No     Drug-Drug Interactions with current regimen:  Protonix - can increase INR  Ceftriaxone/Ceftin - increased risk of bleeding, may increase INR  Zoloft - increased risk of bleeding    Warfarin Dosing During Admission:    Date  9/1 9/2 9/3         INR  1.00 1.06 1.19         Dose  6.5mg 6.5mg (6.5mg)            Education Provided: Education attempted 9/2/19 but patient refused. Patient discharging back to nursing facility today.    Discharge Follow up:    Following Provider - Resident [facility]   Follow up time range or appointment - 3-5 days after discharge    Labs:    Results from last 7 days   Lab Units 09/03/19 0518 09/02/19  0842 09/02/19  0623 09/01/19  0921   INR  1.19* 1.06  --  1.00   HEMOGLOBIN g/dL 8.9*  --  9.3* 10.3*   HEMATOCRIT % 31.0*  --  31.5* 35.0   PLATELETS 10*3/mm3 236  --  257 245     Results from last 7 days   Lab Units 09/03/19 0518 09/02/19  0623 09/01/19  0921   SODIUM mmol/L 134* 137 134*   POTASSIUM mmol/L 4.7 4.2 4.9   CHLORIDE mmol/L 97* 97* 95*   CO2 mmol/L 23.0 25.0 26.0   BUN mg/dL 40* 48* 42*   CREATININE mg/dL 4.15* 4.14* 4.41*   CALCIUM mg/dL 8.8 9.1 9.3   BILIRUBIN mg/dL  --   --  0.4   ALK PHOS U/L  --   --  113   ALT (SGPT) U/L  --   --  6   AST (SGOT) U/L  --   --  11   GLUCOSE mg/dL 332* 248* 348*       Current dietary intake: ~% of documented meals  Diet Order   Procedures   • Diet Regular; Cardiac, Consistent Carbohydrate, Renal       Assessment/Plan:   1. Patient's INR is subtherapeutic today at 1.19. Will continue patient's home dose of warfarin 6.5mg daily.   2. Will follow daily PT/INR.  3. Pharmacy will continue to follow closely and adjust warfarin as needed based on drug " interactions, daily INR trend, and clinical status.    Thank you  Usama Sharma, PharmD, BCPS  9/3/2019  1:33 PM

## 2019-09-03 NOTE — PROGRESS NOTES
"   LOS: 2 days    Patient Care Team:  Goldy Dior MD as PCP - General (Internal Medicine)    Chief Complaint: Gastroenteritis.  69-year-old female, ESRD dialyzed Monday Wednesday Friday through tunnel catheter, AV graft or fistula was not placed because of abscess in the back under treatment.  Patient has been on antibiotic developed nausea vomiting and diarrhea abdominal discomfort.  Patient admitted.  Subjective   No acute events overnight. Eating lunch. No new complaints.  Positive C. difficile infection.    Review of Systems:   Complains of abdominal discomfort, diarrhea.  Denies any shortness of breath or chest pain.    Objective     Vital Sign Min/Max for last 24 hours  Temp  Min: 97.4 °F (36.3 °C)  Max: 98 °F (36.7 °C)   BP  Min: 116/56  Max: 156/69   Pulse  Min: 69  Max: 80   Resp  Min: 16  Max: 20   SpO2  Min: 95 %  Max: 97 %   No Data Recorded   Weight  Min: 130 kg (287 lb 6 oz)  Max: 130 kg (287 lb 6 oz)     Flowsheet Rows      First Filed Value   Admission Height  162.6 cm (64\") Documented at 09/01/2019 0900   Admission Weight  136 kg (300 lb) Documented at 09/01/2019 0900          I/O this shift:  In: 240 [P.O.:240]  Out: -   I/O last 3 completed shifts:  In: 240 [P.O.:240]  Out: 2420 [Urine:100; Other:2320]    Physical Exam:  General Appearance: Alert, oriented, mild distress.  Eyes: PER, EOMI.  Neck: Supple no JVD.  Lungs: Clear auscultation, no rales rhonchi's, equal chest movement, nonlabored.  Heart: No gallop, murmur, rub, RRR.  Abdomen: Soft, mild tenderness, positive bowel sounds, no organomegaly.  Really obese  Extremities: No edema, no cyanosis.  Neuro: No focal deficit, moving all extremities, alert oriented X 3      WBC WBC   Date Value Ref Range Status   09/03/2019 10.92 (H) 3.40 - 10.80 10*3/mm3 Final   09/02/2019 9.78 3.40 - 10.80 10*3/mm3 Final   09/01/2019 7.88 3.40 - 10.80 10*3/mm3 Final      HGB Hemoglobin   Date Value Ref Range Status   09/03/2019 8.9 (L) 12.0 - 15.9 g/dL " Final   09/02/2019 9.3 (L) 12.0 - 15.9 g/dL Final   09/01/2019 10.3 (L) 12.0 - 15.9 g/dL Final      HCT Hematocrit   Date Value Ref Range Status   09/03/2019 31.0 (L) 34.0 - 46.6 % Final   09/02/2019 31.5 (L) 34.0 - 46.6 % Final   09/01/2019 35.0 34.0 - 46.6 % Final      Platlets No results found for: LABPLAT   MCV MCV   Date Value Ref Range Status   09/03/2019 97.5 (H) 79.0 - 97.0 fL Final   09/02/2019 96.3 79.0 - 97.0 fL Final   09/01/2019 95.9 79.0 - 97.0 fL Final          Sodium Sodium   Date Value Ref Range Status   09/03/2019 134 (L) 136 - 145 mmol/L Final   09/02/2019 137 136 - 145 mmol/L Final   09/01/2019 134 (L) 136 - 145 mmol/L Final      Potassium Potassium   Date Value Ref Range Status   09/03/2019 4.7 3.5 - 5.2 mmol/L Final   09/02/2019 4.2 3.5 - 5.2 mmol/L Final   09/01/2019 4.9 3.5 - 5.2 mmol/L Final      Chloride Chloride   Date Value Ref Range Status   09/03/2019 97 (L) 98 - 107 mmol/L Final   09/02/2019 97 (L) 98 - 107 mmol/L Final   09/01/2019 95 (L) 98 - 107 mmol/L Final      CO2 CO2   Date Value Ref Range Status   09/03/2019 23.0 22.0 - 29.0 mmol/L Final   09/02/2019 25.0 22.0 - 29.0 mmol/L Final   09/01/2019 26.0 22.0 - 29.0 mmol/L Final      BUN BUN   Date Value Ref Range Status   09/03/2019 40 (H) 8 - 23 mg/dL Final   09/02/2019 48 (H) 8 - 23 mg/dL Final   09/01/2019 42 (H) 8 - 23 mg/dL Final      Creatinine Creatinine   Date Value Ref Range Status   09/03/2019 4.15 (H) 0.57 - 1.00 mg/dL Final   09/02/2019 4.14 (H) 0.57 - 1.00 mg/dL Final   09/01/2019 4.41 (H) 0.57 - 1.00 mg/dL Final      Calcium Calcium   Date Value Ref Range Status   09/03/2019 8.8 8.6 - 10.5 mg/dL Final   09/02/2019 9.1 8.6 - 10.5 mg/dL Final   09/01/2019 9.3 8.6 - 10.5 mg/dL Final      PO4 No results found for: CAPO4   Albumin Albumin   Date Value Ref Range Status   09/03/2019 3.20 (L) 3.50 - 5.20 g/dL Final   09/02/2019 3.50 3.50 - 5.20 g/dL Final   09/01/2019 3.60 3.50 - 5.20 g/dL Final      Magnesium No results found  for: MG   Uric Acid No results found for: URICACID        Results Review:     I reviewed the patient's new clinical results.      atorvastatin 10 mg Oral Nightly   budesonide-formoterol 2 puff Inhalation BID - RT   calcitriol 0.25 mcg Oral Daily   carvedilol 25 mg Oral BID With Meals   ceftriaxone 1 g Intravenous Q24H   docusate sodium 100 mg Oral BID   epoetin porter/porter-epbx 10,000 Units Subcutaneous Once per day on Mon Wed Fri   insulin lispro 0-9 Units Subcutaneous 4x Daily With Meals & Nightly   isosorbide mononitrate 30 mg Oral Daily   pantoprazole 40 mg Oral Daily   polyethylene glycol 17 g Oral BID   promethazine 25 mg Oral Once per day on Mon Wed Fri   saccharomyces boulardii 250 mg Oral Daily   sertraline 50 mg Oral Daily   sevelamer 800 mg Oral TID With Meals   sodium chloride 10 mL Intravenous Q12H   warfarin 6.5 mg Oral Daily       Pharmacy to dose warfarin        Medication Review: As above seen    Assessment/Plan       Abdominal pain    Essential hypertension    Morbid obesity (CMS/[facility])    UTI (urinary tract infection), bacterial    Type 2 diabetes mellitus with chronic kidney disease on chronic dialysis, with long-term current use of insulin (CMS/[facility])    Abscess of back s/p drainage as outpatient 8/22    ESRD (end stage renal disease) (CMS/[facility])    Elevated troponin    Urinary tract infection without hematuria    1.  ESRD: Dialysis Monday Wednesday Friday.  2.  Gastroenteritis with C. difficile infection.  3.  Anemia of chronic kidney disease.  4.  Essential hypertension.  5.  Abscess of back status post drainage by Dr. Rocha.  6.  Diabetes type 2.  7.  Secondary hyperparathyroidism on calcitriol.  8.  Hyperphosphatemia secondary to ESRD on oral binders.  Plan   HD per schedule.   Epo with HD.   Renal diet.       Jackson Welch MD  09/03/19  11:44 AM

## 2019-09-03 NOTE — DISCHARGE SUMMARY
[facility]  DISCHARGE SUMMARY    Patient Name: Joy Bueno  : 1950  MRN: [id number]    Date of Admission: 2019  Date of Discharge:  19  Primary Care Physician: Goldy Dior MD    Consults     Date and Time Order Name Status Description    2019 1518 Inpatient Nephrology Consult Completed           Hospital Course     Presenting Problem:   Urinary tract infection without hematuria, site unspecified [N39.0]    Active Hospital Problems    Diagnosis  POA   • Elevated troponin [R74.8]  Yes   • Urinary tract infection without hematuria [N39.0]  Yes   • ESRD (end stage renal disease) (CMS/[facility]) [N18.6]  Yes   • Abscess of back s/p drainage as outpatient  [L02.212]  Yes   • Type 2 diabetes mellitus with chronic kidney disease on chronic dialysis, with long-term current use of insulin (CMS/[facility]) [E11.22, N18.6, Z99.2, Z79.4]  Not Applicable   • UTI (urinary tract infection), bacterial [N39.0, A49.9]  Yes   • Essential hypertension [I10]  Yes   • Morbid obesity (CMS/[facility]) [E66.01]  Yes      Resolved Hospital Problems    Diagnosis Date Resolved POA   • **Abdominal pain [R10.9] 2019 Yes          Hospital Course:  69-year-old female with multiple medical problems presenting with vague complaints of abdominal pain and not feeling right.  Has not missed any dialysis.  Urinalysis consistent with a UTI.       Acute UTI  Abd pain   Her abdominal pain discomfort was likely related to UTI because improved with treatment. Pain has improved on 2nd day of admission and resolved on day of discharge   - UCx with e.coli. She was on rocephin during admission but will discharge on ceftin 500 mg BID for 5 additional days      Minimally elevated cardiac enzymes   - Likely related to ESRD and diastolic heart failure   - Trop trending down and she remained pain free       ESRD  - nephrology was consulted and she was maintained on her regular dialysis schedule       DM   -basal bolus sliding scale insulin and titrate as necessary.  - Recommend continuing to follow at [facility]      History of DVT   - Sounds like Coumadin was held prior to I&D of her back abscesses and not restarted back  - INR 1.19 on day of discharge. She was resumed back on her home 6.5 mg daily.     Discharge Follow Up Recommendations for labs/diagnostics:  PCP 1 week  Facility provider in 2 days with repeat INR check   Continue dialysis schedule M, W, F     Day of Discharge     HPI:   No acute events. States her abd pain is resolved. Overall feeling much better. No further epigastric pain. States she feels comfortable and ready for discharge.     Review of Systems  Gen- No fevers, chills  CV- No chest pain, palpitations  Resp- No cough, dyspnea  GI- No N/V/D, abd pain    All other systems reviewed and negative except any additional pertinent positives and negatives discussed in HPI.     Vital Signs:   Temp:  [97.4 °F (36.3 °C)-98 °F (36.7 °C)] 98 °F (36.7 °C)  Heart Rate:  [69-80] 72  Resp:  [16-20] 16  BP: (116-156)/(56-69) 156/69     Physical Exam:  Constitutional: No acute distress, awake, alert; obese   HENT: NCAT, mucous membranes moist  Respiratory: Clear to auscultation bilaterally, respiratory effort normal   Cardiovascular: RRR, II/VI murmurs, no rubs, or gallops, palpable pedal pulses bilaterally  Gastrointestinal: Positive bowel sounds, soft, nontender, nondistended  Musculoskeletal: trace bilateral ankle edema  Psychiatric: Appropriate affect, cooperative  Neurologic: Oriented x 3, strength symmetric in all extremities, Cranial Nerves grossly intact to confrontation, speech clear  Skin: No rashes      Pertinent  and/or Most Recent Results     Results from last 7 days   Lab Units 09/03/19  0518 09/02/19  0623 09/01/19  0921   WBC 10*3/mm3 10.92* 9.78 7.88   HEMOGLOBIN g/dL 8.9* 9.3* 10.3*   HEMATOCRIT % 31.0* 31.5* 35.0   PLATELETS 10*3/mm3 236 257 245   SODIUM mmol/L 134* 137 134*    POTASSIUM mmol/L 4.7 4.2 4.9   CHLORIDE mmol/L 97* 97* 95*   CO2 mmol/L 23.0 25.0 26.0   BUN mg/dL 40* 48* 42*   CREATININE mg/dL 4.15* 4.14* 4.41*   GLUCOSE mg/dL 332* 248* 348*   CALCIUM mg/dL 8.8 9.1 9.3     Results from last 7 days   Lab Units 09/03/19  0518 09/02/19  0842 09/01/19  0921   BILIRUBIN mg/dL  --   --  0.4   ALK PHOS U/L  --   --  113   ALT (SGPT) U/L  --   --  6   AST (SGOT) U/L  --   --  11   PROTIME Seconds 14.5* 13.3 12.7   INR  1.19* 1.06 1.00           Invalid input(s): TG, LDLCALC, LDLREALC  Results from last 7 days   Lab Units 09/02/19  0623 09/01/19  1146 09/01/19  0921   PROBNP pg/mL  --   --  1,436.0*   TROPONIN T ng/mL 0.125* 0.166* 0.137*       Brief Urine Lab Results  (Last result in the past 365 days)      Color   Clarity   Blood   Leuk Est   Nitrite   Protein   CREAT   Urine HCG        09/01/19 1214 Yellow Turbid Moderate (2+) Large (3+) Negative >=300 mg/dL (3+)               Microbiology Results Abnormal     Procedure Component Value - Date/Time    Urine Culture - Urine, Urine, Catheter [[id number]]  (Abnormal)  (Susceptibility) Collected:  09/01/19 1214    Lab Status:  Final result Specimen:  Urine, Catheter Updated:  09/03/19 1008     Urine Culture >100,000 CFU/mL Escherichia coli    Susceptibility      Escherichia coli     AL     Ampicillin Resistant     Ampicillin + Sulbactam Resistant     Cefazolin Susceptible     Cefepime Susceptible     Ceftazidime Susceptible     Ceftriaxone Susceptible     Gentamicin Resistant     Levofloxacin Susceptible     Nitrofurantoin Susceptible     Piperacillin + Tazobactam Intermediate     Tetracycline Resistant     Trimethoprim + Sulfamethoxazole Resistant                          Imaging Results (all)     Procedure Component Value Units Date/Time    XR Chest 1 View [[id number]] Collected:  09/01/19 0945     Updated:  09/01/19 2200    Narrative:          EXAMINATION: XR CHEST, SINGLE VIEW - 09/01/2019     INDICATION: Chest pain.     COMPARISON:  07/22/2019     FINDINGS: Right IJ dialysis-type catheter is again seen with tip in the  right atrium. Patient is rotated to the left. Heart is borderline  enlarged. Vasculature is mildly cephalized. There is little if any  evidence to suggest interstitial edema. No lung consolidation, effusion  or pneumothorax is seen.           Impression:       Mild pulmonary vascular congestion. Minimal if any  interstitial edema. No other evidence of active chest disease is seen.     DICTATED:   09/01/2019  EDITED/ls :   09/01/2019      This report was finalized on 9/1/2019 11:55 PM by DR. Valentino Lo MD.             Results for orders placed during the hospital encounter of 02/11/19   Duplex Venous Upper Extremity - Left CAR    Narrative · There is evidence of deep venous thrombosis in the mid and distal left   brachial vein  · There is evidence of superficial venous thrombosis involving the left   basilic vein in the forearm.          Results for orders placed during the hospital encounter of 02/11/19   Duplex Venous Upper Extremity - Left CAR    Narrative · There is evidence of deep venous thrombosis in the mid and distal left   brachial vein  · There is evidence of superficial venous thrombosis involving the left   basilic vein in the forearm.          Results for orders placed during the hospital encounter of 09/02/17   Adult Transthoracic Echo Complete    Narrative · Left ventricular wall thickness is consistent with mild concentric   hypertrophy.  · Left atrial cavity size is mildly dilated.  · Mild mitral valve regurgitation is present  · calcification of the aortic valve  · Mild tricuspid valve regurgitation is present.            Discharge Details        Discharge Medications      New Medications      Instructions Start Date   cefuroxime 500 MG tablet  Commonly known as:  CEFTIN   500 mg, Oral, Every 12 Hours Scheduled         Continue These Medications      Instructions Start Date   acetaminophen 325 MG  tablet  Commonly known as:  TYLENOL   650 mg, Oral, Every 6 Hours PRN      albuterol sulfate  (90 Base) MCG/ACT inhaler  Commonly known as:  PROVENTIL HFA;VENTOLIN HFA;PROAIR HFA   2 puffs, Inhalation, Every 4 Hours PRN      atorvastatin 10 MG tablet  Commonly known as:  LIPITOR   10 mg, Oral, Nightly      calcitriol 0.25 MCG capsule  Commonly known as:  ROCALTROL   0.25 mcg, Oral, Daily      carvedilol 25 MG tablet  Commonly known as:  COREG   25 mg, Oral, 2 Times Daily With Meals      diltiaZEM (CARDIZEM) 2% cream   Topical, 3 Times Daily PRN      docusate sodium 100 MG capsule  Commonly known as:  COLACE   100 mg, Oral, 2 Times Daily      fluticasone-salmeterol 500-50 MCG/DOSE DISKUS  Commonly known as:  ADVAIR   1 puff, Inhalation, 2 Times Daily - RT      HYDROcodone-acetaminophen 5-325 MG per tablet  Commonly known as:  NORCO   1 tablet, Oral, Every 8 Hours PRN      insulin aspart 100 UNIT/ML solution pen-injector sc pen  Commonly known as:  novoLOG FLEXPEN   5 Units, Subcutaneous, 3 Times Daily With Meals      ipratropium-albuterol 0.5-2.5 mg/3 ml nebulizer  Commonly known as:  DUO-NEB   3 mL, Nebulization, Every 6 Hours PRN      isosorbide mononitrate 30 MG 24 hr tablet  Commonly known as:  IMDUR   30 mg, Oral, Daily      Lidocaine 2 % gel   Apply externally, 3 Times Daily PRN      melatonin 3 MG tablet   3 mg, Oral, Nightly      ondansetron 4 MG tablet  Commonly known as:  ZOFRAN   4 mg, Oral, Every 8 Hours PRN      pantoprazole 40 MG EC tablet  Commonly known as:  PROTONIX   40 mg, Oral, Daily      polyethylene glycol pack packet  Commonly known as:  MIRALAX   17 g, Oral, 2 Times Daily      PREPARATION H 0.25-88.44 % suppository suppository  Generic drug:  phenylephrine-cocoa Butter   Rectal, As Needed      promethazine 25 MG tablet  Commonly known as:  PHENERGAN   25 mg, Oral, 3 Times Weekly, On dialysis days (Mon Weds Fri)       saccharomyces boulardii 250 MG capsule  Commonly known as:   FLORASTOR   250 mg, Oral, Daily      sertraline 50 MG tablet  Commonly known as:  ZOLOFT   50 mg, Oral, Daily      sevelamer 800 MG tablet  Commonly known as:  RENVELA   800 mg, Oral, 3 Times Daily With Meals, 0800, 1200 & 1700      TAB-A-STAR tablet   1 tablet, Oral, Daily      vitamin D 60957 units capsule capsule  Commonly known as:  ERGOCALCIFEROL   50,000 Units, Oral, Weekly, TAKES ON WEDNESDAYS       warfarin 4 MG tablet  Commonly known as:  COUMADIN   4 mg, Oral, Daily Warfarin, Take with 2.5mg for a total of 6.5mg       warfarin 2.5 MG tablet  Commonly known as:  COUMADIN   2.5 mg, Oral, Daily Warfarin, Take with 4mg for a total of 6.5mg              Allergies   Allergen Reactions   • Geodon [Ziprasidone Hcl] Unknown (See Comments)     PT DOESN'T REMEMBER   • Haldol [Haloperidol Lactate] Unknown (See Comments)     PT DOESN'T REMEMBER   • Seroquel [Quetiapine Fumarate] Unknown (See Comments)     PT DOESN'T REMEMBER         Discharge Disposition:  Skilled Nursing Facility (DC - External)    Discharge Diet:  Diet Order   Procedures   • Diet Regular; Cardiac, Consistent Carbohydrate, Renal         Discharge Activity:   Activity Instructions     Activity as Tolerated              CODE STATUS:    Code Status and Medical Interventions:   Ordered at: 09/01/19 1435     Level Of Support Discussed With:    Patient     Code Status:    CPR     Medical Interventions (Level of Support Prior to Arrest):    Full         No future appointments.    Additional Instructions for the Follow-ups that You Need to Schedule     Discharge Follow-up with PCP   As directed       Currently Documented PCP:    Goldy Dior MD    PCP Phone Number:    [phone]     Follow Up Details:  PCP 1 week         Discharge Follow-up with Specified Provider: Dialysis as scheduled Monday, Wednesday, Friday   As directed      To:  Dialysis as scheduled Monday, Wednesday, Friday         Discharge Follow-up with Specified Provider: Facility  provider and INR check in 2 days   As directed      To:  Facility provider and INR check in 2 days               Time Spent on Discharge:  35 minutes    Electronically signed by Carlita Jackson DO, 09/03/19, 12:57 PM.

## 2019-09-03 NOTE — NURSING NOTE
Spoke with Isis @ [facility].  will be going on site to get patients personal wheelchair so the wheels van can transport her back. Report given to Isis. Phone number given if any issues should arise when patient gets to NH.  Double checked to make sure patient does not  Need a prescription for Lortab. None needed- she was on it prior to admission.

## 2019-09-05 ENCOUNTER — LAB REQUISITION (OUTPATIENT)
Dept: LAB | Facility: HOSPITAL | Age: 69
End: 2019-09-05

## 2019-09-05 DIAGNOSIS — Z00.00 ROUTINE GENERAL MEDICAL EXAMINATION AT A HEALTH CARE FACILITY: ICD-10-CM

## 2019-09-05 LAB
INR PPP: 1.35 (ref 0.85–1.16)
PROTHROMBIN TIME: 16.1 SECONDS (ref 11.2–14.3)

## 2019-09-05 PROCEDURE — 85610 PROTHROMBIN TIME: CPT

## 2019-09-29 ENCOUNTER — APPOINTMENT (OUTPATIENT)
Dept: GENERAL RADIOLOGY | Facility: HOSPITAL | Age: 69
End: 2019-09-29

## 2019-09-29 ENCOUNTER — APPOINTMENT (OUTPATIENT)
Dept: MRI IMAGING | Facility: HOSPITAL | Age: 69
End: 2019-09-29

## 2019-09-29 ENCOUNTER — APPOINTMENT (OUTPATIENT)
Dept: CT IMAGING | Facility: HOSPITAL | Age: 69
End: 2019-09-29

## 2019-09-29 ENCOUNTER — HOSPITAL ENCOUNTER (INPATIENT)
Facility: HOSPITAL | Age: 69
LOS: 3 days | Discharge: NURSING, REHAB OR LONG-TERM CARE | End: 2019-10-02
Attending: EMERGENCY MEDICINE | Admitting: INTERNAL MEDICINE

## 2019-09-29 DIAGNOSIS — I63.9 CEREBELLAR INFARCT (HCC): Primary | ICD-10-CM

## 2019-09-29 DIAGNOSIS — Z74.09 IMPAIRED MOBILITY AND ADLS: ICD-10-CM

## 2019-09-29 DIAGNOSIS — R42 VERTIGO: ICD-10-CM

## 2019-09-29 DIAGNOSIS — Z78.9 IMPAIRED MOBILITY AND ADLS: ICD-10-CM

## 2019-09-29 DIAGNOSIS — R41.841 COGNITIVE COMMUNICATION DEFICIT: ICD-10-CM

## 2019-09-29 DIAGNOSIS — L98.491: ICD-10-CM

## 2019-09-29 DIAGNOSIS — T45.511A POISONING BY WARFARIN SODIUM, ACCIDENTAL OR UNINTENTIONAL, INITIAL ENCOUNTER: ICD-10-CM

## 2019-09-29 PROBLEM — R77.8 ELEVATED TROPONIN: Status: RESOLVED | Noted: 2019-09-01 | Resolved: 2019-09-29

## 2019-09-29 PROBLEM — S30.810A EXCORIATION OF BUTTOCK: Status: ACTIVE | Noted: 2019-09-29

## 2019-09-29 PROBLEM — R53.1 GENERALIZED WEAKNESS: Status: RESOLVED | Noted: 2019-07-31 | Resolved: 2019-09-29

## 2019-09-29 PROBLEM — J96.02 ACUTE RESPIRATORY FAILURE WITH HYPERCAPNIA (HCC): Status: RESOLVED | Noted: 2019-04-27 | Resolved: 2019-09-29

## 2019-09-29 PROBLEM — B96.89 URINARY TRACT INFECTION DUE TO EXTENDED-SPECTRUM BETA LACTAMASE (ESBL)-PRODUCING KLEBSIELLA: Status: RESOLVED | Noted: 2019-02-20 | Resolved: 2019-09-29

## 2019-09-29 PROBLEM — N39.0 URINARY TRACT INFECTION DUE TO EXTENDED-SPECTRUM BETA LACTAMASE (ESBL)-PRODUCING KLEBSIELLA: Status: RESOLVED | Noted: 2019-02-20 | Resolved: 2019-09-29

## 2019-09-29 PROBLEM — E87.70 VOLUME OVERLOAD: Status: RESOLVED | Noted: 2019-04-06 | Resolved: 2019-09-29

## 2019-09-29 PROBLEM — N39.0 URINARY TRACT INFECTION WITHOUT HEMATURIA: Status: RESOLVED | Noted: 2019-09-01 | Resolved: 2019-09-29

## 2019-09-29 PROBLEM — Z99.2 HEMODIALYSIS PATIENT (HCC): Status: ACTIVE | Noted: 2019-09-29

## 2019-09-29 PROBLEM — B35.0 TINEA CAPITIS: Status: RESOLVED | Noted: 2018-08-03 | Resolved: 2019-09-29

## 2019-09-29 PROBLEM — N39.0 UTI (URINARY TRACT INFECTION), BACTERIAL: Status: RESOLVED | Noted: 2017-10-04 | Resolved: 2019-09-29

## 2019-09-29 PROBLEM — L02.213 CHEST WALL ABSCESS: Status: RESOLVED | Noted: 2019-04-05 | Resolved: 2019-09-29

## 2019-09-29 PROBLEM — J06.9 URI (UPPER RESPIRATORY INFECTION): Status: RESOLVED | Noted: 2019-04-05 | Resolved: 2019-09-29

## 2019-09-29 PROBLEM — M79.604 BILATERAL LEG PAIN: Status: RESOLVED | Noted: 2017-10-04 | Resolved: 2019-09-29

## 2019-09-29 PROBLEM — E87.5 HYPERKALEMIA: Status: RESOLVED | Noted: 2019-07-31 | Resolved: 2019-09-29

## 2019-09-29 PROBLEM — A49.9 UTI (URINARY TRACT INFECTION), BACTERIAL: Status: RESOLVED | Noted: 2017-10-04 | Resolved: 2019-09-29

## 2019-09-29 PROBLEM — R53.1 GENERALIZED WEAKNESS: Status: RESOLVED | Noted: 2017-10-09 | Resolved: 2019-09-29

## 2019-09-29 PROBLEM — G93.41 ENCEPHALOPATHY, METABOLIC: Status: RESOLVED | Noted: 2019-04-27 | Resolved: 2019-09-29

## 2019-09-29 PROBLEM — K59.01 SLOW TRANSIT CONSTIPATION: Status: RESOLVED | Noted: 2019-02-20 | Resolved: 2019-09-29

## 2019-09-29 PROBLEM — J45.909 ASTHMA: Status: RESOLVED | Noted: 2017-08-03 | Resolved: 2019-09-29

## 2019-09-29 PROBLEM — M79.605 BILATERAL LEG PAIN: Status: RESOLVED | Noted: 2017-10-04 | Resolved: 2019-09-29

## 2019-09-29 LAB
ALBUMIN SERPL-MCNC: 3.3 G/DL (ref 3.5–5.2)
ALBUMIN/GLOB SERPL: 0.8 G/DL
ALP SERPL-CCNC: 106 U/L (ref 39–117)
ALT SERPL W P-5'-P-CCNC: 7 U/L (ref 1–33)
ANION GAP SERPL CALCULATED.3IONS-SCNC: 14 MMOL/L (ref 5–15)
AST SERPL-CCNC: 10 U/L (ref 1–32)
BASOPHILS # BLD AUTO: 0.03 10*3/MM3 (ref 0–0.2)
BASOPHILS NFR BLD AUTO: 0.5 % (ref 0–1.5)
BILIRUB SERPL-MCNC: 0.4 MG/DL (ref 0.2–1.2)
BUN BLD-MCNC: 62 MG/DL (ref 8–23)
BUN/CREAT SERPL: 14.3 (ref 7–25)
CALCIUM SPEC-SCNC: 8.5 MG/DL (ref 8.6–10.5)
CHLORIDE SERPL-SCNC: 101 MMOL/L (ref 98–107)
CO2 SERPL-SCNC: 22 MMOL/L (ref 22–29)
CREAT BLD-MCNC: 4.33 MG/DL (ref 0.57–1)
DEPRECATED RDW RBC AUTO: 69.2 FL (ref 37–54)
EOSINOPHIL # BLD AUTO: 0.27 10*3/MM3 (ref 0–0.4)
EOSINOPHIL NFR BLD AUTO: 4.3 % (ref 0.3–6.2)
ERYTHROCYTE [DISTWIDTH] IN BLOOD BY AUTOMATED COUNT: 19.4 % (ref 12.3–15.4)
GFR SERPL CREATININE-BSD FRML MDRD: 12 ML/MIN/1.73
GFR SERPL CREATININE-BSD FRML MDRD: ABNORMAL ML/MIN/{1.73_M2}
GLOBULIN UR ELPH-MCNC: 4 GM/DL
GLUCOSE BLD-MCNC: 259 MG/DL (ref 65–99)
GLUCOSE BLDC GLUCOMTR-MCNC: 217 MG/DL (ref 70–130)
HCT VFR BLD AUTO: 37.3 % (ref 34–46.6)
HGB BLD-MCNC: 10.8 G/DL (ref 12–15.9)
IMM GRANULOCYTES # BLD AUTO: 0.05 10*3/MM3 (ref 0–0.05)
IMM GRANULOCYTES NFR BLD AUTO: 0.8 % (ref 0–0.5)
INR PPP: 4.31 (ref 0.85–1.16)
LYMPHOCYTES # BLD AUTO: 1.47 10*3/MM3 (ref 0.7–3.1)
LYMPHOCYTES NFR BLD AUTO: 23.5 % (ref 19.6–45.3)
MCH RBC QN AUTO: 28.7 PG (ref 26.6–33)
MCHC RBC AUTO-ENTMCNC: 29 G/DL (ref 31.5–35.7)
MCV RBC AUTO: 99.2 FL (ref 79–97)
MONOCYTES # BLD AUTO: 0.52 10*3/MM3 (ref 0.1–0.9)
MONOCYTES NFR BLD AUTO: 8.3 % (ref 5–12)
NEUTROPHILS # BLD AUTO: 3.91 10*3/MM3 (ref 1.7–7)
NEUTROPHILS NFR BLD AUTO: 62.6 % (ref 42.7–76)
NRBC BLD AUTO-RTO: 0.3 /100 WBC (ref 0–0.2)
PLATELET # BLD AUTO: 247 10*3/MM3 (ref 140–450)
PMV BLD AUTO: 9.7 FL (ref 6–12)
POTASSIUM BLD-SCNC: 4.8 MMOL/L (ref 3.5–5.2)
PROT SERPL-MCNC: 7.3 G/DL (ref 6–8.5)
PROTHROMBIN TIME: 39.7 SECONDS (ref 11.2–14.3)
RBC # BLD AUTO: 3.76 10*6/MM3 (ref 3.77–5.28)
SODIUM BLD-SCNC: 137 MMOL/L (ref 136–145)
WBC NRBC COR # BLD: 6.25 10*3/MM3 (ref 3.4–10.8)

## 2019-09-29 PROCEDURE — 99284 EMERGENCY DEPT VISIT MOD MDM: CPT

## 2019-09-29 PROCEDURE — 99285 EMERGENCY DEPT VISIT HI MDM: CPT

## 2019-09-29 PROCEDURE — 70450 CT HEAD/BRAIN W/O DYE: CPT

## 2019-09-29 PROCEDURE — 80053 COMPREHEN METABOLIC PANEL: CPT | Performed by: PHYSICIAN ASSISTANT

## 2019-09-29 PROCEDURE — 70551 MRI BRAIN STEM W/O DYE: CPT

## 2019-09-29 PROCEDURE — 82962 GLUCOSE BLOOD TEST: CPT

## 2019-09-29 PROCEDURE — 93005 ELECTROCARDIOGRAM TRACING: CPT | Performed by: PHYSICIAN ASSISTANT

## 2019-09-29 PROCEDURE — 94799 UNLISTED PULMONARY SVC/PX: CPT

## 2019-09-29 PROCEDURE — 63710000001 INSULIN LISPRO (HUMAN) PER 5 UNITS: Performed by: NURSE PRACTITIONER

## 2019-09-29 PROCEDURE — 85025 COMPLETE CBC W/AUTO DIFF WBC: CPT | Performed by: PHYSICIAN ASSISTANT

## 2019-09-29 PROCEDURE — 99223 1ST HOSP IP/OBS HIGH 75: CPT | Performed by: HOSPITALIST

## 2019-09-29 PROCEDURE — 85610 PROTHROMBIN TIME: CPT | Performed by: PHYSICIAN ASSISTANT

## 2019-09-29 RX ORDER — SODIUM CHLORIDE 0.9 % (FLUSH) 0.9 %
10 SYRINGE (ML) INJECTION EVERY 12 HOURS SCHEDULED
Status: DISCONTINUED | OUTPATIENT
Start: 2019-09-29 | End: 2019-10-02 | Stop reason: HOSPADM

## 2019-09-29 RX ORDER — PROMETHAZINE HYDROCHLORIDE 25 MG/ML
12.5 INJECTION, SOLUTION INTRAMUSCULAR; INTRAVENOUS ONCE
Status: DISCONTINUED | OUTPATIENT
Start: 2019-09-29 | End: 2019-09-29

## 2019-09-29 RX ORDER — SERTRALINE HYDROCHLORIDE 25 MG/1
50 TABLET, FILM COATED ORAL DAILY
Status: DISCONTINUED | OUTPATIENT
Start: 2019-09-30 | End: 2019-10-02 | Stop reason: HOSPADM

## 2019-09-29 RX ORDER — CHOLECALCIFEROL (VITAMIN D3) 125 MCG
5 CAPSULE ORAL NIGHTLY
Status: DISCONTINUED | OUTPATIENT
Start: 2019-09-29 | End: 2019-10-02 | Stop reason: HOSPADM

## 2019-09-29 RX ORDER — SENNA AND DOCUSATE SODIUM 50; 8.6 MG/1; MG/1
2 TABLET, FILM COATED ORAL NIGHTLY
Status: DISCONTINUED | OUTPATIENT
Start: 2019-09-29 | End: 2019-10-02 | Stop reason: HOSPADM

## 2019-09-29 RX ORDER — SODIUM CHLORIDE 0.9 % (FLUSH) 0.9 %
10 SYRINGE (ML) INJECTION AS NEEDED
Status: DISCONTINUED | OUTPATIENT
Start: 2019-09-29 | End: 2019-10-02 | Stop reason: HOSPADM

## 2019-09-29 RX ORDER — HYDROCODONE BITARTRATE AND ACETAMINOPHEN 5; 325 MG/1; MG/1
1 TABLET ORAL EVERY 8 HOURS PRN
Status: DISCONTINUED | OUTPATIENT
Start: 2019-09-29 | End: 2019-09-30

## 2019-09-29 RX ORDER — SEVELAMER CARBONATE FOR ORAL SUSPENSION 800 MG/1
800 POWDER, FOR SUSPENSION ORAL
Status: DISCONTINUED | OUTPATIENT
Start: 2019-09-30 | End: 2019-10-02 | Stop reason: HOSPADM

## 2019-09-29 RX ORDER — BUDESONIDE AND FORMOTEROL FUMARATE DIHYDRATE 160; 4.5 UG/1; UG/1
2 AEROSOL RESPIRATORY (INHALATION)
Status: DISCONTINUED | OUTPATIENT
Start: 2019-09-29 | End: 2019-10-02 | Stop reason: HOSPADM

## 2019-09-29 RX ORDER — DOCUSATE SODIUM 100 MG/1
100 CAPSULE, LIQUID FILLED ORAL 2 TIMES DAILY
Status: DISCONTINUED | OUTPATIENT
Start: 2019-09-29 | End: 2019-09-29

## 2019-09-29 RX ORDER — ONDANSETRON 4 MG/1
4 TABLET, FILM COATED ORAL EVERY 6 HOURS PRN
Status: DISCONTINUED | OUTPATIENT
Start: 2019-09-29 | End: 2019-10-02 | Stop reason: HOSPADM

## 2019-09-29 RX ORDER — CALCITRIOL 0.25 UG/1
0.25 CAPSULE, LIQUID FILLED ORAL DAILY
Status: DISCONTINUED | OUTPATIENT
Start: 2019-09-30 | End: 2019-10-02 | Stop reason: HOSPADM

## 2019-09-29 RX ORDER — CARVEDILOL 12.5 MG/1
25 TABLET ORAL 2 TIMES DAILY WITH MEALS
Status: DISCONTINUED | OUTPATIENT
Start: 2019-09-29 | End: 2019-09-29

## 2019-09-29 RX ORDER — ACETAMINOPHEN 325 MG/1
650 TABLET ORAL EVERY 6 HOURS PRN
Status: DISCONTINUED | OUTPATIENT
Start: 2019-09-29 | End: 2019-10-02 | Stop reason: HOSPADM

## 2019-09-29 RX ORDER — PANTOPRAZOLE SODIUM 40 MG/1
40 TABLET, DELAYED RELEASE ORAL DAILY
Status: DISCONTINUED | OUTPATIENT
Start: 2019-09-30 | End: 2019-10-02 | Stop reason: HOSPADM

## 2019-09-29 RX ORDER — ATORVASTATIN CALCIUM 40 MG/1
80 TABLET, FILM COATED ORAL NIGHTLY
Status: DISCONTINUED | OUTPATIENT
Start: 2019-09-29 | End: 2019-10-02 | Stop reason: HOSPADM

## 2019-09-29 RX ORDER — ONDANSETRON 2 MG/ML
4 INJECTION INTRAMUSCULAR; INTRAVENOUS EVERY 6 HOURS PRN
Status: DISCONTINUED | OUTPATIENT
Start: 2019-09-29 | End: 2019-10-02 | Stop reason: HOSPADM

## 2019-09-29 RX ORDER — DIPHENOXYLATE HYDROCHLORIDE AND ATROPINE SULFATE 2.5; .025 MG/1; MG/1
1 TABLET ORAL DAILY
Status: DISCONTINUED | OUTPATIENT
Start: 2019-09-30 | End: 2019-10-02 | Stop reason: HOSPADM

## 2019-09-29 RX ORDER — SACCHAROMYCES BOULARDII 250 MG
250 CAPSULE ORAL DAILY
Status: DISCONTINUED | OUTPATIENT
Start: 2019-09-30 | End: 2019-10-02 | Stop reason: HOSPADM

## 2019-09-29 RX ORDER — BISACODYL 10 MG
10 SUPPOSITORY, RECTAL RECTAL DAILY PRN
Status: DISCONTINUED | OUTPATIENT
Start: 2019-09-29 | End: 2019-10-02 | Stop reason: HOSPADM

## 2019-09-29 RX ORDER — NICOTINE POLACRILEX 4 MG
15 LOZENGE BUCCAL
Status: DISCONTINUED | OUTPATIENT
Start: 2019-09-29 | End: 2019-10-02 | Stop reason: HOSPADM

## 2019-09-29 RX ORDER — DEXTROSE MONOHYDRATE 25 G/50ML
25 INJECTION, SOLUTION INTRAVENOUS
Status: DISCONTINUED | OUTPATIENT
Start: 2019-09-29 | End: 2019-10-02 | Stop reason: HOSPADM

## 2019-09-29 RX ORDER — ISOSORBIDE MONONITRATE 30 MG/1
30 TABLET, EXTENDED RELEASE ORAL DAILY
Status: DISCONTINUED | OUTPATIENT
Start: 2019-09-30 | End: 2019-09-29

## 2019-09-29 RX ORDER — IPRATROPIUM BROMIDE AND ALBUTEROL SULFATE 2.5; .5 MG/3ML; MG/3ML
3 SOLUTION RESPIRATORY (INHALATION) EVERY 6 HOURS PRN
Status: DISCONTINUED | OUTPATIENT
Start: 2019-09-29 | End: 2019-10-02 | Stop reason: HOSPADM

## 2019-09-29 RX ADMIN — HYDROCODONE BITARTRATE AND ACETAMINOPHEN 1 TABLET: 5; 325 TABLET ORAL at 22:47

## 2019-09-29 RX ADMIN — SENNOSIDES AND DOCUSATE SODIUM 2 TABLET: 8.6; 5 TABLET ORAL at 22:47

## 2019-09-29 RX ADMIN — INSULIN LISPRO 3 UNITS: 100 INJECTION, SOLUTION INTRAVENOUS; SUBCUTANEOUS at 22:48

## 2019-09-29 RX ADMIN — MELATONIN TAB 5 MG 5 MG: 5 TAB at 22:47

## 2019-09-29 RX ADMIN — ATORVASTATIN CALCIUM 80 MG: 40 TABLET, FILM COATED ORAL at 22:46

## 2019-09-29 RX ADMIN — BUDESONIDE AND FORMOTEROL FUMARATE DIHYDRATE 2 PUFF: 160; 4.5 AEROSOL RESPIRATORY (INHALATION) at 22:40

## 2019-09-29 RX ADMIN — SODIUM CHLORIDE, PRESERVATIVE FREE 10 ML: 5 INJECTION INTRAVENOUS at 22:47

## 2019-09-30 ENCOUNTER — APPOINTMENT (OUTPATIENT)
Dept: CARDIOLOGY | Facility: HOSPITAL | Age: 69
End: 2019-09-30

## 2019-09-30 ENCOUNTER — APPOINTMENT (OUTPATIENT)
Dept: NEPHROLOGY | Facility: HOSPITAL | Age: 69
End: 2019-09-30

## 2019-09-30 LAB
ANION GAP SERPL CALCULATED.3IONS-SCNC: 15 MMOL/L (ref 5–15)
APTT PPP: 58.6 SECONDS (ref 24–37)
BASOPHILS # BLD AUTO: 0.03 10*3/MM3 (ref 0–0.2)
BASOPHILS NFR BLD AUTO: 0.4 % (ref 0–1.5)
BH CV ECHO MEAS - AO ROOT AREA (BSA CORRECTED): 1.1
BH CV ECHO MEAS - AO ROOT AREA: 5 CM^2
BH CV ECHO MEAS - AO ROOT DIAM: 2.5 CM
BH CV ECHO MEAS - BSA(HAYCOCK): 2.5 M^2
BH CV ECHO MEAS - BSA: 2.3 M^2
BH CV ECHO MEAS - BZI_BMI: 49.1 KILOGRAMS/M^2
BH CV ECHO MEAS - BZI_METRIC_HEIGHT: 162.6 CM
BH CV ECHO MEAS - BZI_METRIC_WEIGHT: 129.7 KG
BH CV ECHO MEAS - EDV(CUBED): 35.4 ML
BH CV ECHO MEAS - EDV(TEICH): 43.6 ML
BH CV ECHO MEAS - EF(CUBED): 62.6 %
BH CV ECHO MEAS - EF(TEICH): 55.4 %
BH CV ECHO MEAS - ESV(CUBED): 13.2 ML
BH CV ECHO MEAS - ESV(TEICH): 19.5 ML
BH CV ECHO MEAS - FS: 27.9 %
BH CV ECHO MEAS - IVS/LVPW: 0.87
BH CV ECHO MEAS - IVSD: 1.1 CM
BH CV ECHO MEAS - LA DIMENSION: 2.6 CM
BH CV ECHO MEAS - LA/AO: 1
BH CV ECHO MEAS - LAT PEAK E' VEL: 3.6 CM/SEC
BH CV ECHO MEAS - LATERAL E/E' RATIO: 12.3
BH CV ECHO MEAS - LV MASS(C)D: 118.6 GRAMS
BH CV ECHO MEAS - LV MASS(C)DI: 52.1 GRAMS/M^2
BH CV ECHO MEAS - LVIDD: 3.3 CM
BH CV ECHO MEAS - LVIDS: 2.4 CM
BH CV ECHO MEAS - LVPWD: 1.2 CM
BH CV ECHO MEAS - MED PEAK E' VEL: 4.5 CM/SEC
BH CV ECHO MEAS - MEDIAL E/E' RATIO: 10
BH CV ECHO MEAS - MV A MAX VEL: 42.4 CM/SEC
BH CV ECHO MEAS - MV DEC SLOPE: 341 CM/SEC^2
BH CV ECHO MEAS - MV DEC TIME: 0.15 SEC
BH CV ECHO MEAS - MV E MAX VEL: 46.4 CM/SEC
BH CV ECHO MEAS - MV E/A: 1.1
BH CV ECHO MEAS - MV P1/2T MAX VEL: 65.2 CM/SEC
BH CV ECHO MEAS - MV P1/2T: 56 MSEC
BH CV ECHO MEAS - MVA P1/2T LCG: 3.4 CM^2
BH CV ECHO MEAS - MVA(P1/2T): 3.9 CM^2
BH CV ECHO MEAS - PA ACC SLOPE: 951.5 CM/SEC^2
BH CV ECHO MEAS - PA ACC TIME: 0.09 SEC
BH CV ECHO MEAS - PA PR(ACCEL): 39.8 MMHG
BH CV ECHO MEAS - RV MAX PG: 1 MMHG
BH CV ECHO MEAS - RV V1 MAX: 50.8 CM/SEC
BH CV ECHO MEAS - SI(CUBED): 9.7 ML/M^2
BH CV ECHO MEAS - SI(TEICH): 10.6 ML/M^2
BH CV ECHO MEAS - SV(CUBED): 22.2 ML
BH CV ECHO MEAS - SV(TEICH): 24.1 ML
BH CV ECHO MEAS - TAPSE (>1.6): 1.8 CM2
BH CV ECHO MEASUREMENTS AVERAGE E/E' RATIO: 11.46
BH CV XLRA - RV BASE: 4.1 CM
BH CV XLRA - RV LENGTH: 7.1 CM
BH CV XLRA - RV MID: 3.3 CM
BH CV XLRA - TDI S': 11.6 CM/SEC
BH CV XLRA MEAS LEFT CCA RATIO VEL: 64.7 CM/SEC
BH CV XLRA MEAS LEFT DIST CCA EDV: 13.8 CM/SEC
BH CV XLRA MEAS LEFT DIST CCA PSV: 65.4 CM/SEC
BH CV XLRA MEAS LEFT DIST ICA EDV: 17.7 CM/SEC
BH CV XLRA MEAS LEFT DIST ICA PSV: 64.8 CM/SEC
BH CV XLRA MEAS LEFT ICA RATIO VEL: 48.3 CM/SEC
BH CV XLRA MEAS LEFT ICA/CCA RATIO: 0.75
BH CV XLRA MEAS LEFT MID CCA EDV: 15.1 CM/SEC
BH CV XLRA MEAS LEFT MID CCA PSV: 69.8 CM/SEC
BH CV XLRA MEAS LEFT MID ICA EDV: 13.8 CM/SEC
BH CV XLRA MEAS LEFT MID ICA PSV: 48.7 CM/SEC
BH CV XLRA MEAS LEFT PROX CCA EDV: 13.8 CM/SEC
BH CV XLRA MEAS LEFT PROX CCA PSV: 69.1 CM/SEC
BH CV XLRA MEAS LEFT PROX ECA EDV: 8.6 CM/SEC
BH CV XLRA MEAS LEFT PROX ECA PSV: 77.4 CM/SEC
BH CV XLRA MEAS LEFT PROX ICA EDV: 8.6 CM/SEC
BH CV XLRA MEAS LEFT PROX ICA PSV: 38.1 CM/SEC
BH CV XLRA MEAS LEFT PROX SCLA PSV: 99.9 CM/SEC
BH CV XLRA MEAS LEFT VERTEBRAL A EDV: 7.9 CM/SEC
BH CV XLRA MEAS LEFT VERTEBRAL A PSV: 41.3 CM/SEC
BH CV XLRA MEAS RIGHT CCA RATIO VEL: 46.3 CM/SEC
BH CV XLRA MEAS RIGHT DIST CCA EDV: 9.6 CM/SEC
BH CV XLRA MEAS RIGHT DIST CCA PSV: 46.7 CM/SEC
BH CV XLRA MEAS RIGHT DIST ICA EDV: 10.9 CM/SEC
BH CV XLRA MEAS RIGHT DIST ICA PSV: 51.1 CM/SEC
BH CV XLRA MEAS RIGHT ICA RATIO VEL: 57.2 CM/SEC
BH CV XLRA MEAS RIGHT ICA/CCA RATIO: 1.2
BH CV XLRA MEAS RIGHT MID CCA EDV: 10.9 CM/SEC
BH CV XLRA MEAS RIGHT MID CCA PSV: 71.6 CM/SEC
BH CV XLRA MEAS RIGHT MID ICA EDV: 8.3 CM/SEC
BH CV XLRA MEAS RIGHT MID ICA PSV: 45.8 CM/SEC
BH CV XLRA MEAS RIGHT PROX CCA EDV: 10 CM/SEC
BH CV XLRA MEAS RIGHT PROX CCA PSV: 66.4 CM/SEC
BH CV XLRA MEAS RIGHT PROX ECA PSV: 42.3 CM/SEC
BH CV XLRA MEAS RIGHT PROX ICA EDV: 9.2 CM/SEC
BH CV XLRA MEAS RIGHT PROX ICA PSV: 57.6 CM/SEC
BH CV XLRA MEAS RIGHT PROX SCLA PSV: 49.3 CM/SEC
BH CV XLRA MEAS RIGHT VERTEBRAL A EDV: 7.9 CM/SEC
BH CV XLRA MEAS RIGHT VERTEBRAL A PSV: 48.9 CM/SEC
BUN BLD-MCNC: 64 MG/DL (ref 8–23)
BUN/CREAT SERPL: 13 (ref 7–25)
CA-I SERPL ISE-MCNC: 1.29 MMOL/L (ref 1.12–1.32)
CALCIUM SPEC-SCNC: 8.9 MG/DL (ref 8.6–10.5)
CHLORIDE SERPL-SCNC: 102 MMOL/L (ref 98–107)
CHOLEST SERPL-MCNC: 118 MG/DL (ref 0–200)
CO2 SERPL-SCNC: 22 MMOL/L (ref 22–29)
CREAT BLD-MCNC: 4.91 MG/DL (ref 0.57–1)
DEPRECATED RDW RBC AUTO: 70.1 FL (ref 37–54)
EOSINOPHIL # BLD AUTO: 0.3 10*3/MM3 (ref 0–0.4)
EOSINOPHIL NFR BLD AUTO: 3.9 % (ref 0.3–6.2)
ERYTHROCYTE [DISTWIDTH] IN BLOOD BY AUTOMATED COUNT: 19 % (ref 12.3–15.4)
GFR SERPL CREATININE-BSD FRML MDRD: 11 ML/MIN/1.73
GFR SERPL CREATININE-BSD FRML MDRD: ABNORMAL ML/MIN/{1.73_M2}
GLUCOSE BLD-MCNC: 234 MG/DL (ref 65–99)
GLUCOSE BLDC GLUCOMTR-MCNC: 102 MG/DL (ref 70–130)
GLUCOSE BLDC GLUCOMTR-MCNC: 274 MG/DL (ref 70–130)
GLUCOSE BLDC GLUCOMTR-MCNC: 296 MG/DL (ref 70–130)
GLUCOSE BLDC GLUCOMTR-MCNC: 298 MG/DL (ref 70–130)
HBA1C MFR BLD: 8.8 % (ref 4.8–5.6)
HCT VFR BLD AUTO: 38 % (ref 34–46.6)
HDLC SERPL-MCNC: 31 MG/DL (ref 40–60)
HGB BLD-MCNC: 11 G/DL (ref 12–15.9)
IMM GRANULOCYTES # BLD AUTO: 0.05 10*3/MM3 (ref 0–0.05)
IMM GRANULOCYTES NFR BLD AUTO: 0.7 % (ref 0–0.5)
INR PPP: 4.33 (ref 0.85–1.16)
LDLC SERPL CALC-MCNC: 38 MG/DL (ref 0–100)
LDLC/HDLC SERPL: 1.22 {RATIO}
LV EF 2D ECHO EST: 60 %
LYMPHOCYTES # BLD AUTO: 1.56 10*3/MM3 (ref 0.7–3.1)
LYMPHOCYTES NFR BLD AUTO: 20.4 % (ref 19.6–45.3)
MAGNESIUM SERPL-MCNC: 1.7 MG/DL (ref 1.6–2.4)
MAXIMAL PREDICTED HEART RATE: 152 BPM
MCH RBC QN AUTO: 28.6 PG (ref 26.6–33)
MCHC RBC AUTO-ENTMCNC: 28.9 G/DL (ref 31.5–35.7)
MCV RBC AUTO: 98.7 FL (ref 79–97)
MONOCYTES # BLD AUTO: 0.54 10*3/MM3 (ref 0.1–0.9)
MONOCYTES NFR BLD AUTO: 7.1 % (ref 5–12)
NEUTROPHILS # BLD AUTO: 5.15 10*3/MM3 (ref 1.7–7)
NEUTROPHILS NFR BLD AUTO: 67.5 % (ref 42.7–76)
NRBC BLD AUTO-RTO: 0 /100 WBC (ref 0–0.2)
PLATELET # BLD AUTO: 261 10*3/MM3 (ref 140–450)
PMV BLD AUTO: 10.5 FL (ref 6–12)
POTASSIUM BLD-SCNC: 4.5 MMOL/L (ref 3.5–5.2)
PROTHROMBIN TIME: 39.9 SECONDS (ref 11.2–14.3)
RBC # BLD AUTO: 3.85 10*6/MM3 (ref 3.77–5.28)
SODIUM BLD-SCNC: 139 MMOL/L (ref 136–145)
STRESS TARGET HR: 129 BPM
TRIGL SERPL-MCNC: 246 MG/DL (ref 0–150)
TSH SERPL DL<=0.05 MIU/L-ACNC: 4.25 UIU/ML (ref 0.27–4.2)
VLDLC SERPL-MCNC: 49.2 MG/DL
WBC NRBC COR # BLD: 7.63 10*3/MM3 (ref 3.4–10.8)

## 2019-09-30 PROCEDURE — 93880 EXTRACRANIAL BILAT STUDY: CPT

## 2019-09-30 PROCEDURE — 94799 UNLISTED PULMONARY SVC/PX: CPT

## 2019-09-30 PROCEDURE — 25010000002 HEPARIN (PORCINE) PER 1000 UNITS: Performed by: INTERNAL MEDICINE

## 2019-09-30 PROCEDURE — 99223 1ST HOSP IP/OBS HIGH 75: CPT | Performed by: PSYCHIATRY & NEUROLOGY

## 2019-09-30 PROCEDURE — 83036 HEMOGLOBIN GLYCOSYLATED A1C: CPT | Performed by: NURSE PRACTITIONER

## 2019-09-30 PROCEDURE — 84443 ASSAY THYROID STIM HORMONE: CPT | Performed by: NURSE PRACTITIONER

## 2019-09-30 PROCEDURE — 83735 ASSAY OF MAGNESIUM: CPT | Performed by: NURSE PRACTITIONER

## 2019-09-30 PROCEDURE — 85025 COMPLETE CBC W/AUTO DIFF WBC: CPT | Performed by: NURSE PRACTITIONER

## 2019-09-30 PROCEDURE — 92523 SPEECH SOUND LANG COMPREHEN: CPT

## 2019-09-30 PROCEDURE — 99233 SBSQ HOSP IP/OBS HIGH 50: CPT | Performed by: INTERNAL MEDICINE

## 2019-09-30 PROCEDURE — 82330 ASSAY OF CALCIUM: CPT | Performed by: NURSE PRACTITIONER

## 2019-09-30 PROCEDURE — 85610 PROTHROMBIN TIME: CPT | Performed by: NURSE PRACTITIONER

## 2019-09-30 PROCEDURE — 80061 LIPID PANEL: CPT | Performed by: NURSE PRACTITIONER

## 2019-09-30 PROCEDURE — 80048 BASIC METABOLIC PNL TOTAL CA: CPT | Performed by: NURSE PRACTITIONER

## 2019-09-30 PROCEDURE — 63710000001 INSULIN LISPRO (HUMAN) PER 5 UNITS: Performed by: NURSE PRACTITIONER

## 2019-09-30 PROCEDURE — 85730 THROMBOPLASTIN TIME PARTIAL: CPT | Performed by: NURSE PRACTITIONER

## 2019-09-30 PROCEDURE — 93005 ELECTROCARDIOGRAM TRACING: CPT | Performed by: NURSE PRACTITIONER

## 2019-09-30 PROCEDURE — 93306 TTE W/DOPPLER COMPLETE: CPT

## 2019-09-30 PROCEDURE — 82962 GLUCOSE BLOOD TEST: CPT

## 2019-09-30 PROCEDURE — 93010 ELECTROCARDIOGRAM REPORT: CPT | Performed by: INTERNAL MEDICINE

## 2019-09-30 RX ORDER — ASPIRIN 81 MG/1
81 TABLET, CHEWABLE ORAL DAILY
Status: DISCONTINUED | OUTPATIENT
Start: 2019-09-30 | End: 2019-10-02 | Stop reason: HOSPADM

## 2019-09-30 RX ORDER — HEPARIN SODIUM 1000 [USP'U]/ML
1800 INJECTION, SOLUTION INTRAVENOUS; SUBCUTANEOUS AS NEEDED
Status: DISCONTINUED | OUTPATIENT
Start: 2019-09-30 | End: 2019-10-02 | Stop reason: HOSPADM

## 2019-09-30 RX ORDER — HYDROCODONE BITARTRATE AND ACETAMINOPHEN 5; 325 MG/1; MG/1
2 TABLET ORAL EVERY 8 HOURS PRN
Status: DISCONTINUED | OUTPATIENT
Start: 2019-09-30 | End: 2019-10-02 | Stop reason: HOSPADM

## 2019-09-30 RX ORDER — ALBUMIN (HUMAN) 12.5 G/50ML
12.5 SOLUTION INTRAVENOUS AS NEEDED
Status: ACTIVE | OUTPATIENT
Start: 2019-09-30 | End: 2019-10-01

## 2019-09-30 RX ADMIN — SODIUM CHLORIDE, PRESERVATIVE FREE 10 ML: 5 INJECTION INTRAVENOUS at 22:01

## 2019-09-30 RX ADMIN — INSULIN LISPRO 4 UNITS: 100 INJECTION, SOLUTION INTRAVENOUS; SUBCUTANEOUS at 18:08

## 2019-09-30 RX ADMIN — ONDANSETRON HYDROCHLORIDE 4 MG: 4 TABLET, FILM COATED ORAL at 09:27

## 2019-09-30 RX ADMIN — INSULIN LISPRO 4 UNITS: 100 INJECTION, SOLUTION INTRAVENOUS; SUBCUTANEOUS at 22:00

## 2019-09-30 RX ADMIN — HYDROCODONE BITARTRATE AND ACETAMINOPHEN 1 TABLET: 5; 325 TABLET ORAL at 08:24

## 2019-09-30 RX ADMIN — SODIUM CHLORIDE, PRESERVATIVE FREE 10 ML: 5 INJECTION INTRAVENOUS at 08:22

## 2019-09-30 RX ADMIN — HEPARIN SODIUM 1800 UNITS: 1000 INJECTION, SOLUTION INTRAVENOUS; SUBCUTANEOUS at 13:45

## 2019-09-30 RX ADMIN — ASPIRIN 81 MG 81 MG: 81 TABLET ORAL at 18:43

## 2019-09-30 RX ADMIN — PANTOPRAZOLE SODIUM 40 MG: 40 TABLET, DELAYED RELEASE ORAL at 08:24

## 2019-09-30 RX ADMIN — HYDROCODONE BITARTRATE AND ACETAMINOPHEN 2 TABLET: 5; 325 TABLET ORAL at 22:00

## 2019-09-30 RX ADMIN — BUDESONIDE AND FORMOTEROL FUMARATE DIHYDRATE 2 PUFF: 160; 4.5 AEROSOL RESPIRATORY (INHALATION) at 20:30

## 2019-09-30 RX ADMIN — MELATONIN TAB 5 MG 5 MG: 5 TAB at 22:00

## 2019-09-30 RX ADMIN — SERTRALINE HYDROCHLORIDE 50 MG: 25 TABLET ORAL at 08:24

## 2019-09-30 RX ADMIN — INSULIN LISPRO 4 UNITS: 100 INJECTION, SOLUTION INTRAVENOUS; SUBCUTANEOUS at 09:34

## 2019-09-30 RX ADMIN — ATORVASTATIN CALCIUM 80 MG: 40 TABLET, FILM COATED ORAL at 22:00

## 2019-09-30 RX ADMIN — MULTIVITAMIN TABLET 1 TABLET: TABLET at 08:25

## 2019-09-30 RX ADMIN — SENNOSIDES AND DOCUSATE SODIUM 2 TABLET: 8.6; 5 TABLET ORAL at 22:00

## 2019-09-30 RX ADMIN — CALCITRIOL 0.25 MCG: 0.25 CAPSULE ORAL at 08:24

## 2019-09-30 RX ADMIN — Medication 250 MG: at 08:24

## 2019-10-01 LAB
ALBUMIN SERPL-MCNC: 2.8 G/DL (ref 3.5–5.2)
ALBUMIN/GLOB SERPL: 0.7 G/DL
ALP SERPL-CCNC: 121 U/L (ref 39–117)
ALT SERPL W P-5'-P-CCNC: 6 U/L (ref 1–33)
ANION GAP SERPL CALCULATED.3IONS-SCNC: 13 MMOL/L (ref 5–15)
AST SERPL-CCNC: 9 U/L (ref 1–32)
BASOPHILS # BLD AUTO: 0.05 10*3/MM3 (ref 0–0.2)
BASOPHILS NFR BLD AUTO: 0.6 % (ref 0–1.5)
BILIRUB SERPL-MCNC: 0.4 MG/DL (ref 0.2–1.2)
BUN BLD-MCNC: 43 MG/DL (ref 8–23)
BUN/CREAT SERPL: 11.5 (ref 7–25)
CALCIUM SPEC-SCNC: 8.5 MG/DL (ref 8.6–10.5)
CHLORIDE SERPL-SCNC: 103 MMOL/L (ref 98–107)
CO2 SERPL-SCNC: 22 MMOL/L (ref 22–29)
CREAT BLD-MCNC: 3.74 MG/DL (ref 0.57–1)
CRP SERPL-MCNC: 2.4 MG/DL (ref 0–0.5)
DEPRECATED RDW RBC AUTO: 70.5 FL (ref 37–54)
EOSINOPHIL # BLD AUTO: 0.31 10*3/MM3 (ref 0–0.4)
EOSINOPHIL NFR BLD AUTO: 3.7 % (ref 0.3–6.2)
ERYTHROCYTE [DISTWIDTH] IN BLOOD BY AUTOMATED COUNT: 19.2 % (ref 12.3–15.4)
ERYTHROCYTE [SEDIMENTATION RATE] IN BLOOD: 57 MM/HR (ref 0–30)
GFR SERPL CREATININE-BSD FRML MDRD: 15 ML/MIN/1.73
GLOBULIN UR ELPH-MCNC: 4.3 GM/DL
GLUCOSE BLD-MCNC: 277 MG/DL (ref 65–99)
GLUCOSE BLDC GLUCOMTR-MCNC: 247 MG/DL (ref 70–130)
GLUCOSE BLDC GLUCOMTR-MCNC: 269 MG/DL (ref 70–130)
GLUCOSE BLDC GLUCOMTR-MCNC: 325 MG/DL (ref 70–130)
GLUCOSE BLDC GLUCOMTR-MCNC: 373 MG/DL (ref 70–130)
HCT VFR BLD AUTO: 38.3 % (ref 34–46.6)
HGB BLD-MCNC: 11 G/DL (ref 12–15.9)
IMM GRANULOCYTES # BLD AUTO: 0.06 10*3/MM3 (ref 0–0.05)
IMM GRANULOCYTES NFR BLD AUTO: 0.7 % (ref 0–0.5)
INR PPP: 3.38 (ref 0.85–1.16)
LYMPHOCYTES # BLD AUTO: 2.03 10*3/MM3 (ref 0.7–3.1)
LYMPHOCYTES NFR BLD AUTO: 24.5 % (ref 19.6–45.3)
MCH RBC QN AUTO: 28.8 PG (ref 26.6–33)
MCHC RBC AUTO-ENTMCNC: 28.7 G/DL (ref 31.5–35.7)
MCV RBC AUTO: 100.3 FL (ref 79–97)
MONOCYTES # BLD AUTO: 0.6 10*3/MM3 (ref 0.1–0.9)
MONOCYTES NFR BLD AUTO: 7.2 % (ref 5–12)
NEUTROPHILS # BLD AUTO: 5.23 10*3/MM3 (ref 1.7–7)
NEUTROPHILS NFR BLD AUTO: 63.3 % (ref 42.7–76)
NRBC BLD AUTO-RTO: 0 /100 WBC (ref 0–0.2)
PLATELET # BLD AUTO: 238 10*3/MM3 (ref 140–450)
PMV BLD AUTO: 10.2 FL (ref 6–12)
POTASSIUM BLD-SCNC: 4.5 MMOL/L (ref 3.5–5.2)
PROT SERPL-MCNC: 7.1 G/DL (ref 6–8.5)
PROTHROMBIN TIME: 32.9 SECONDS (ref 11.2–14.3)
RBC # BLD AUTO: 3.82 10*6/MM3 (ref 3.77–5.28)
SODIUM BLD-SCNC: 138 MMOL/L (ref 136–145)
WBC NRBC COR # BLD: 8.28 10*3/MM3 (ref 3.4–10.8)

## 2019-10-01 PROCEDURE — 85610 PROTHROMBIN TIME: CPT | Performed by: INTERNAL MEDICINE

## 2019-10-01 PROCEDURE — 92507 TX SP LANG VOICE COMM INDIV: CPT

## 2019-10-01 PROCEDURE — 86140 C-REACTIVE PROTEIN: CPT | Performed by: INTERNAL MEDICINE

## 2019-10-01 PROCEDURE — 85025 COMPLETE CBC W/AUTO DIFF WBC: CPT | Performed by: INTERNAL MEDICINE

## 2019-10-01 PROCEDURE — 85652 RBC SED RATE AUTOMATED: CPT | Performed by: INTERNAL MEDICINE

## 2019-10-01 PROCEDURE — 99231 SBSQ HOSP IP/OBS SF/LOW 25: CPT | Performed by: PSYCHIATRY & NEUROLOGY

## 2019-10-01 PROCEDURE — 97165 OT EVAL LOW COMPLEX 30 MIN: CPT

## 2019-10-01 PROCEDURE — 94799 UNLISTED PULMONARY SVC/PX: CPT

## 2019-10-01 PROCEDURE — 63710000001 INSULIN LISPRO (HUMAN) PER 5 UNITS: Performed by: NURSE PRACTITIONER

## 2019-10-01 PROCEDURE — 80053 COMPREHEN METABOLIC PANEL: CPT | Performed by: INTERNAL MEDICINE

## 2019-10-01 PROCEDURE — 99239 HOSP IP/OBS DSCHRG MGMT >30: CPT | Performed by: INTERNAL MEDICINE

## 2019-10-01 PROCEDURE — 82962 GLUCOSE BLOOD TEST: CPT

## 2019-10-01 PROCEDURE — 25010000002 HYDRALAZINE PER 20 MG: Performed by: INTERNAL MEDICINE

## 2019-10-01 RX ORDER — CARVEDILOL 12.5 MG/1
25 TABLET ORAL 2 TIMES DAILY WITH MEALS
Status: DISCONTINUED | OUTPATIENT
Start: 2019-10-01 | End: 2019-10-02 | Stop reason: HOSPADM

## 2019-10-01 RX ORDER — WARFARIN SODIUM 2.5 MG/1
2.5 TABLET ORAL
Qty: 30 TABLET | Refills: 0 | Status: SHIPPED | OUTPATIENT
Start: 2019-10-03 | End: 2019-10-02 | Stop reason: SDUPTHER

## 2019-10-01 RX ORDER — WARFARIN SODIUM 4 MG/1
4 TABLET ORAL
Qty: 30 TABLET | Refills: 0 | Status: SHIPPED | OUTPATIENT
Start: 2019-10-03 | End: 2019-10-02 | Stop reason: SDUPTHER

## 2019-10-01 RX ORDER — MECLIZINE HCL 12.5 MG/1
12.5 TABLET ORAL 3 TIMES DAILY PRN
Status: DISCONTINUED | OUTPATIENT
Start: 2019-10-01 | End: 2019-10-02 | Stop reason: HOSPADM

## 2019-10-01 RX ORDER — ALBUMIN (HUMAN) 12.5 G/50ML
12.5 SOLUTION INTRAVENOUS AS NEEDED
Status: CANCELLED | OUTPATIENT
Start: 2019-10-01 | End: 2019-10-02

## 2019-10-01 RX ORDER — ASPIRIN 81 MG/1
81 TABLET, CHEWABLE ORAL DAILY
Qty: 30 TABLET | Refills: 0 | Status: SHIPPED | OUTPATIENT
Start: 2019-10-02 | End: 2021-02-17 | Stop reason: HOSPADM

## 2019-10-01 RX ORDER — ATORVASTATIN CALCIUM 80 MG/1
80 TABLET, FILM COATED ORAL NIGHTLY
Qty: 30 TABLET | Refills: 0 | Status: SHIPPED | OUTPATIENT
Start: 2019-10-01 | End: 2019-10-02

## 2019-10-01 RX ORDER — ISOSORBIDE MONONITRATE 30 MG/1
30 TABLET, EXTENDED RELEASE ORAL DAILY
Status: DISCONTINUED | OUTPATIENT
Start: 2019-10-01 | End: 2019-10-01

## 2019-10-01 RX ORDER — MECLIZINE HCL 12.5 MG/1
12.5 TABLET ORAL 3 TIMES DAILY PRN
Qty: 15 TABLET | Refills: 0 | Status: SHIPPED | OUTPATIENT
Start: 2019-10-01 | End: 2021-02-17 | Stop reason: HOSPADM

## 2019-10-01 RX ORDER — HYDRALAZINE HYDROCHLORIDE 20 MG/ML
10 INJECTION INTRAMUSCULAR; INTRAVENOUS ONCE
Status: COMPLETED | OUTPATIENT
Start: 2019-10-01 | End: 2019-10-01

## 2019-10-01 RX ORDER — ISOSORBIDE MONONITRATE 60 MG/1
60 TABLET, EXTENDED RELEASE ORAL DAILY
Status: DISCONTINUED | OUTPATIENT
Start: 2019-10-02 | End: 2019-10-02 | Stop reason: HOSPADM

## 2019-10-01 RX ADMIN — BUDESONIDE AND FORMOTEROL FUMARATE DIHYDRATE 2 PUFF: 160; 4.5 AEROSOL RESPIRATORY (INHALATION) at 20:30

## 2019-10-01 RX ADMIN — SEVELAMER CARBONATE 0.8 G: 800 POWDER, FOR SUSPENSION ORAL at 12:35

## 2019-10-01 RX ADMIN — ISOSORBIDE MONONITRATE 30 MG: 30 TABLET, EXTENDED RELEASE ORAL at 09:41

## 2019-10-01 RX ADMIN — SEVELAMER CARBONATE 0.8 G: 800 POWDER, FOR SUSPENSION ORAL at 19:38

## 2019-10-01 RX ADMIN — MECLIZINE HCL 12.5 MG 12.5 MG: 12.5 TABLET ORAL at 12:35

## 2019-10-01 RX ADMIN — SODIUM CHLORIDE, PRESERVATIVE FREE 10 ML: 5 INJECTION INTRAVENOUS at 09:43

## 2019-10-01 RX ADMIN — BUDESONIDE AND FORMOTEROL FUMARATE DIHYDRATE 2 PUFF: 160; 4.5 AEROSOL RESPIRATORY (INHALATION) at 07:53

## 2019-10-01 RX ADMIN — MELATONIN TAB 5 MG 5 MG: 5 TAB at 21:45

## 2019-10-01 RX ADMIN — CARVEDILOL 25 MG: 12.5 TABLET, FILM COATED ORAL at 19:36

## 2019-10-01 RX ADMIN — HYDRALAZINE HYDROCHLORIDE 10 MG: 20 INJECTION INTRAMUSCULAR; INTRAVENOUS at 13:51

## 2019-10-01 RX ADMIN — SENNOSIDES AND DOCUSATE SODIUM 2 TABLET: 8.6; 5 TABLET ORAL at 21:45

## 2019-10-01 RX ADMIN — HYDROCODONE BITARTRATE AND ACETAMINOPHEN 2 TABLET: 5; 325 TABLET ORAL at 21:44

## 2019-10-01 RX ADMIN — ASPIRIN 81 MG 81 MG: 81 TABLET ORAL at 09:41

## 2019-10-01 RX ADMIN — INSULIN LISPRO 3 UNITS: 100 INJECTION, SOLUTION INTRAVENOUS; SUBCUTANEOUS at 09:42

## 2019-10-01 RX ADMIN — IPRATROPIUM BROMIDE AND ALBUTEROL SULFATE 3 ML: 2.5; .5 SOLUTION RESPIRATORY (INHALATION) at 20:30

## 2019-10-01 RX ADMIN — CARVEDILOL 25 MG: 12.5 TABLET, FILM COATED ORAL at 09:41

## 2019-10-01 RX ADMIN — SERTRALINE HYDROCHLORIDE 50 MG: 25 TABLET ORAL at 09:42

## 2019-10-01 RX ADMIN — CALCITRIOL 0.25 MCG: 0.25 CAPSULE ORAL at 09:41

## 2019-10-01 RX ADMIN — INSULIN LISPRO 4 UNITS: 100 INJECTION, SOLUTION INTRAVENOUS; SUBCUTANEOUS at 12:28

## 2019-10-01 RX ADMIN — MULTIVITAMIN TABLET 1 TABLET: TABLET at 09:42

## 2019-10-01 RX ADMIN — ATORVASTATIN CALCIUM 80 MG: 40 TABLET, FILM COATED ORAL at 21:44

## 2019-10-01 RX ADMIN — SEVELAMER CARBONATE 0.8 G: 800 POWDER, FOR SUSPENSION ORAL at 09:42

## 2019-10-01 RX ADMIN — Medication 250 MG: at 09:41

## 2019-10-01 RX ADMIN — PANTOPRAZOLE SODIUM 40 MG: 40 TABLET, DELAYED RELEASE ORAL at 09:41

## 2019-10-01 RX ADMIN — INSULIN LISPRO 6 UNITS: 100 INJECTION, SOLUTION INTRAVENOUS; SUBCUTANEOUS at 21:45

## 2019-10-01 RX ADMIN — INSULIN LISPRO 5 UNITS: 100 INJECTION, SOLUTION INTRAVENOUS; SUBCUTANEOUS at 18:00

## 2019-10-01 NOTE — PROGRESS NOTES
Case Management Discharge Note    Final Note: Plan is back to [facility]. Wheelchair van scheduled for 13:30 today. Nurse to call report to [phone].  will fax discharge summary to facility. Please send any hard scripts with patient.    Destination - Selection Complete      Service Provider Request Status Selected Services Address Phone Number Fax Number    [facility] Selected Intermediate Care [address], [facility] [address] [phone]      Durable Medical Equipment      No service has been selected for the patient.      Dialysis/Infusion - Selection Complete      Service Provider Request Status Selected Services Address Phone Number Fax Number    [facility] Selected Dialysis [address], [facility] [id number] [phone]      Home Medical Care      No service has been selected for the patient.      Therapy      No service has been selected for the patient.      Community Resources      No service has been selected for the patient.             Final Discharge Disposition Code: 04 - intermediate care facility

## 2019-10-01 NOTE — PROGRESS NOTES
Neurology       Patient Care Team:  Goldy Dior MD as PCP - General (Internal Medicine)    Chief complaint: Cerebellar stroke    History: Patient feels back to baseline.    Infectious disease has seen the wound and feels that observation is appropriate.      Past Medical History:   Diagnosis Date   • Acute on chronic diastolic heart failure (CMS/HCC)    • Acute on chronic heart failure (CMS/HCC)    • Acute respiratory failure with hypercapnia (CMS/[facility]) 4/27/2019   • Anemia    • Anxiety    • Asthma    • Bilateral leg pain 10/4/2017   • Chest wall abscess 4/5/2019   • Chronic diastolic heart failure (CMS/[facility])    • Chronic kidney disease    • Diabetes mellitus (CMS/[facility])    • Diabetes mellitus, type II (CMS/HCC) 8/3/2018   • Elevated troponin 9/1/2019   • Encephalopathy, metabolic 4/27/2019   • Generalized weakness 7/31/2019   • History of Clostridium difficile infection 8/3/2018   • History of respiratory failure, since off home oxygen 8/3/2018   • History of UTI 8/3/2018   • Hyperkalemia 7/31/2019   • Hypertension    • Morbid obesity (CMS/[facility])    • Osteoarthritis    • Sleep apnea    • Slow transit constipation 2/20/2019   • Tinea capitis 8/3/2018   • URI (upper respiratory infection) 4/5/2019   • Urinary tract infection due to extended-spectrum beta lactamase (ESBL)-producing Klebsiella 2/20/2019   • Urinary tract infection without hematuria 9/1/2019   • UTI (urinary tract infection), bacterial 10/4/2017   • Volume overload 4/6/2019       Vital Signs   Vitals:    10/01/19 0655 10/01/19 0941 10/01/19 1102 10/01/19 1351   BP: (!) 184/93 (!) 184/93 172/92 170/90   BP Location:   Right arm    Patient Position:   Lying    Pulse: 69  69 79   Resp:   18    Temp:   97.5 °F (36.4 °C)    TempSrc:   Oral    SpO2:   100%    Weight:       Height:           Physical Exam:   General: Awake and alert              Neuro: Tearful and talkative.    Moves all extremities well.        Results Review:  Reviewed  Results from last 7  days   Lab Units 10/01/19  0514   WBC 10*3/mm3 8.28   HEMOGLOBIN g/dL 11.0*   HEMATOCRIT % 38.3   PLATELETS 10*3/mm3 238     Results from last 7 days   Lab Units 10/01/19  0514 09/30/19  0538 09/29/19  1647   SODIUM mmol/L 138 139 137   POTASSIUM mmol/L 4.5 4.5 4.8   CHLORIDE mmol/L 103 102 101   CO2 mmol/L 22.0 22.0 22.0   BUN mg/dL 43* 64* 62*   CREATININE mg/dL 3.74* 4.91* 4.33*   CALCIUM mg/dL 8.5* 8.9 8.5*   BILIRUBIN mg/dL 0.4  --  0.4   ALK PHOS U/L 121*  --  106   ALT (SGPT) U/L 6  --  7   AST (SGOT) U/L 9  --  10   GLUCOSE mg/dL 277* 234* 259*       Imaging Results (last 24 hours)     ** No results found for the last 24 hours. **          Assessment:    Remote cerebellar stroke      Transient neurologic worsening, resolved    Plan:  Okay to discharge anytime from my standpoint    Comment:  See the patient on request          I discussed the patients findings and my recommendations with patient    Steven Shah MD  10/01/19  3:17 PM

## 2019-10-01 NOTE — CONSULTS
INFECTIOUS DISEASE CONSULT/INITIAL HOSPITAL VISIT    Joy Bueno  1950  [id number]    Date of Consult: 10/1/2019    Admission Date: 9/29/2019      Requesting Provider: No ref. provider found  Evaluating Physician: Velasquez Olivo MD    Reason for Consultation: Back abscess    History of present illness:    Patient is a 69 y.o. female with ESRD, DM, chronic diastolic heart failure, brought from [facility] with complaints of dizziness.  She had cysts excised on her back, by Dr. Calderón 8/22, tissue cultures with Proteus.  She has been afebrile without leukocytosis since this admission and stable off antibiotics. We were consulted for evaluation and treatment. She denies fever,chills, night sweats.   Past Medical History:   Diagnosis Date   • Acute on chronic diastolic heart failure (CMS/HCC)    • Acute on chronic heart failure (CMS/HCC)    • Acute respiratory failure with hypercapnia (CMS/HCC) 4/27/2019   • Anemia    • Anxiety    • Asthma    • Bilateral leg pain 10/4/2017   • Chest wall abscess 4/5/2019   • Chronic diastolic heart failure (CMS/HCC)    • Chronic kidney disease    • Diabetes mellitus (CMS/HCC)    • Diabetes mellitus, type II (CMS/HCC) 8/3/2018   • Elevated troponin 9/1/2019   • Encephalopathy, metabolic 4/27/2019   • Generalized weakness 7/31/2019   • History of Clostridium difficile infection 8/3/2018   • History of respiratory failure, since off home oxygen 8/3/2018   • History of UTI 8/3/2018   • Hyperkalemia 7/31/2019   • Hypertension    • Morbid obesity (CMS/[facility])    • Osteoarthritis    • Sleep apnea    • Slow transit constipation 2/20/2019   • Tinea capitis 8/3/2018   • URI (upper respiratory infection) 4/5/2019   • Urinary tract infection due to extended-spectrum beta lactamase (ESBL)-producing Klebsiella 2/20/2019   • Urinary tract infection without hematuria 9/1/2019   • UTI (urinary tract infection), bacterial 10/4/2017   • Volume overload 4/6/2019       Past Surgical History:   Procedure  Laterality Date   • ARM LESION/CYST EXCISION N/A 2019    Procedure: ABSCESS DRAINAGE X2 ON BACK;  Surgeon: Carlos Enrique Calderón MD;  Location:  [facility];  Service: General   • ARTERIOVENOUS FISTULA/SHUNT SURGERY Left 10/22/2018    Procedure: LEFT UPPER EXTREMITY ARTERIOVENOUS FISTULA CREATION;  Surgeon: Carlos Enrique Calderón MD;  Location:  [facility];  Service: Vascular   •  SECTION     • COLONOSCOPY N/A 2017    Procedure: COLONOSCOPY;  Surgeon: Mark I Brunner, MD;  Location:  [facility];  Service:    • ENDOSCOPY N/A 2017    Procedure: ESOPHAGOGASTRODUODENOSCOPY ;  Surgeon: Velasquez Call MD;  Location:  [facility];  Service:    • HERNIA REPAIR     • INSERTION HEMODIALYSIS CATHETER Right 10/17/2018    Procedure: HEMODIALYSIS CATHETER INSERTION;  Surgeon: Carlos Enrique Calderón MD;  Location:  [facility];  Service: General       Family History   Problem Relation Age of Onset   • Cardiomyopathy Mother    • Stroke Father    • Diabetes Father        Social History     Socioeconomic History   • Marital status:      Spouse name: Not on file   • Number of children: Not on file   • Years of education: Not on file   • Highest education level: Not on file   Tobacco Use   • Smoking status: Former Smoker     Packs/day: 0.25   • Smokeless tobacco: Never Used   • Tobacco comment: unknown when quit, maybe last year   Substance and Sexual Activity   • Alcohol use: No   • Drug use: No   • Sexual activity: No   Social History Narrative    Mrs. Bueno is a 68 year old AA  female. She lives alone in [place] but has been in rehab for some time. She has a daughter. She does not have an advanced directive.       Allergies   Allergen Reactions   • Geodon [Ziprasidone Hcl] Unknown (See Comments)     PT DOESN'T REMEMBER   • Haldol [Haloperidol Lactate] Unknown (See Comments)     PT DOESN'T REMEMBER   • Seroquel [Quetiapine Fumarate] Unknown (See Comments)     PT DOESN'T REMEMBER          Medication:    Current Facility-Administered Medications:   •  acetaminophen (TYLENOL) tablet 650 mg, 650 mg, Oral, Q6H PRN, Vesta Monaco APRN  •  albumin human 25 % IV SOLN 12.5 g, 12.5 g, Intravenous, PRN, Jackson Welch MD  •  aspirin chewable tablet 81 mg, 81 mg, Oral, Daily, Steven Shah MD, 81 mg at 10/01/19 0941  •  atorvastatin (LIPITOR) tablet 80 mg, 80 mg, Oral, Nightly, Vesta Monaco APRN, 80 mg at 09/30/19 2200  •  bisacodyl (DULCOLAX) suppository 10 mg, 10 mg, Rectal, Daily PRN, Matthew Ackerman MD  •  budesonide-formoterol (SYMBICORT) 160-4.5 MCG/ACT inhaler 2 puff, 2 puff, Inhalation, BID - RT, Vesta Monaco APRN, 2 puff at 10/01/19 0753  •  calcitriol (ROCALTROL) capsule 0.25 mcg, 0.25 mcg, Oral, Daily, Vesta Monaco APRN, 0.25 mcg at 10/01/19 0941  •  carvedilol (COREG) tablet 25 mg, 25 mg, Oral, BID With Meals, Tri Gary MD, 25 mg at 10/01/19 0941  •  dextrose (D50W) 25 g/ 50mL Intravenous Solution 25 g, 25 g, Intravenous, Q15 Min PRN, Vesta Monaco APRN  •  dextrose (GLUTOSE) oral gel 15 g, 15 g, Oral, Q15 Min PRN, Vesta Monaco APRN  •  glucagon (human recombinant) (GLUCAGEN DIAGNOSTIC) injection 1 mg, 1 mg, Subcutaneous, Q15 Min PRN, Vesta Monaco APRN  •  heparin (porcine) injection 1,800 Units, 1,800 Units, Intracatheter, PRN, Jackson Welch MD, 1,800 Units at 09/30/19 1345  •  HYDROcodone-acetaminophen (NORCO) 5-325 MG per tablet 2 tablet, 2 tablet, Oral, Q8H PRN, Tri Gary MD, 2 tablet at 09/30/19 2200  •  insulin lispro (humaLOG) injection 0-7 Units, 0-7 Units, Subcutaneous, 4x Daily With Meals & Nightly, Vesta Monaco APRN, 4 Units at 10/01/19 1228  •  ipratropium-albuterol (DUO-NEB) nebulizer solution 3 mL, 3 mL, Nebulization, Q6H PRN, Vesta Monaco APRN  •  [START ON 10/2/2019] isosorbide mononitrate (IMDUR) 24 hr tablet 60 mg, 60 mg, Oral, Daily, Tri Gary MD  •  meclizine (ANTIVERT) tablet 12.5 mg, 12.5 mg, Oral,  TID PRN, Tri Gary MD, 12.5 mg at 10/01/19 1235  •  melatonin tablet 5 mg, 5 mg, Oral, Nightly, Vesta Monaco APRN, 5 mg at 09/30/19 2200  •  multivitamin (THERAGRAN) tablet 1 tablet, 1 tablet, Oral, Daily, Vesta Monaco APRN, 1 tablet at 10/01/19 0942  •  ondansetron (ZOFRAN) tablet 4 mg, 4 mg, Oral, Q6H PRN, 4 mg at 09/30/19 0927 **OR** ondansetron (ZOFRAN) injection 4 mg, 4 mg, Intravenous, Q6H PRN, Vesta Monaco APRN  •  pantoprazole (PROTONIX) EC tablet 40 mg, 40 mg, Oral, Daily, Vesta Monaco APRN, 40 mg at 10/01/19 0941  •  polyethylene glycol 3350 powder (packet), 17 g, Oral, BID PRN, Matthew Ackerman MD  •  saccharomyces boulardii (FLORASTOR) capsule 250 mg, 250 mg, Oral, Daily, Vesta Monaco APRN, 250 mg at 10/01/19 0941  •  sennosides-docusate sodium (SENOKOT-S) 8.6-50 MG tablet 2 tablet, 2 tablet, Oral, Nightly, Matthew Ackerman MD, 2 tablet at 09/30/19 2200  •  sertraline (ZOLOFT) tablet 50 mg, 50 mg, Oral, Daily, Vesta Monaco APRN, 50 mg at 10/01/19 0942  •  sevelamer (RENVELA) packet 0.8 g, 800 mg, Oral, TID With Meals, Vesta Monaco APRN, 0.8 g at 10/01/19 1235  •  sodium chloride 0.9 % flush 10 mL, 10 mL, Intravenous, Q12H, Vesta Monaco APRN, 10 mL at 10/01/19 0943  •  sodium chloride 0.9 % flush 10 mL, 10 mL, Intravenous, PRN, Vesta Monaco APRN    Antibiotics:  Anti-Infectives (From admission, onward)    None            Review of Systems:  Constitutional-- No Fever, chills or sweats.  Appetite good, and no malaise. No fatigue.  HEENT-- No new vision, hearing or throat complaints.  No epistaxis or oral sores.  Denies odynophagia or dysphagia. No headache, photophobia or neck stiffness.  CV-- No chest pain, palpitation or syncope  Resp-- No SOB/cough/Hemoptysis  GI- No nausea, vomiting, or diarrhea.  No hematochezia, melena, or hematemesis. Denies jaundice or chronic liver disease. + rectal pajn  -- No dysuria, hematuria, or flank pain.  Denies hesitancy,  urgency or flank pain.  Lymph- no swollen lymph nodes in neck/axilla or groin.   Heme- No active bruising or bleeding; no Hx of DVT or PE.  MS-- no swelling or pain in the bones or joints of arms/legs.  No new back pain.  Neuro-- No acute focal weakness or numbness in the arms or legs.  No seizures. Complains of dizziness   Skin--No rashes - 2 incisions on back       Physical Exam:   Vital Signs  Temp (24hrs), Av.9 °F (36.6 °C), Min:97.5 °F (36.4 °C), Max:98.6 °F (37 °C)    Temp  Min: 97.5 °F (36.4 °C)  Max: 98.6 °F (37 °C)  BP  Min: 149/68  Max: 187/95  Pulse  Min: 69  Max: 79  Resp  Min: 18  Max: 20  SpO2  Min: 95 %  Max: 100 %    GENERAL: Awake and alert, in no acute distress.   HEENT: Normocephalic, atraumatic.  PERRL. EOMI. No conjunctival injection. No icterus. Oropharynx clear without evidence of thrush or exudate. No evidence of peridontal disease.    NECK: Supple without nuchal rigidity. No mass.  LYMPH: No cervical, axillary or inguinal lymphadenopathy.  HEART: RRR; No murmur, rubs, gallops.   LUNGS: Clear to auscultation bilaterally without wheezing, rales, rhonchi. Normal respiratory effort. Nonlabored. No dullness.  ABDOMEN: Soft, nontender, nondistended. Positive bowel sounds. No rebound or guarding. NO mass or HSM.  EXT:  No cyanosis, clubbing or edema. No cord.  :  purewick   MSK:  No joint effusions   SKIN: Warm and dry   NEURO: Oriented to PPT  PSYCHIATRIC: Normal insight and judgement. Cooperative with PE  Left scapula incison approx 2cm with small amount of bloody drainage, no purulence, redness, right shoulder incision approx 1cm with scant bloody drainage on dressing.  There is no purulent drainage or fluctuance.    Laboratory Data    Results from last 7 days   Lab Units 10/01/19  0514 19  0538 19  1647   WBC 10*3/mm3 8.28 7.63 6.25   HEMOGLOBIN g/dL 11.0* 11.0* 10.8*   HEMATOCRIT % 38.3 38.0 37.3   PLATELETS 10*3/mm3 238 261 247     Results from last 7 days   Lab Units  10/01/19  0514   SODIUM mmol/L 138   POTASSIUM mmol/L 4.5   CHLORIDE mmol/L 103   CO2 mmol/L 22.0   BUN mg/dL 43*   CREATININE mg/dL 3.74*   GLUCOSE mg/dL 277*   CALCIUM mg/dL 8.5*     Results from last 7 days   Lab Units 10/01/19  0514   ALK PHOS U/L 121*   BILIRUBIN mg/dL 0.4   ALT (SGPT) U/L 6   AST (SGOT) U/L 9     Results from last 7 days   Lab Units 10/01/19  0514   SED RATE mm/hr 57*     Results from last 7 days   Lab Units 10/01/19  0514   CRP mg/dL 2.40*                 Estimated Creatinine Clearance: 19.3 mL/min (A) (by C-G formula based on SCr of 3.74 mg/dL (H)).      Microbiology:      8/22:  Tissue   Proteus                           Radiology:  Imaging Results (last 72 hours)     Procedure Component Value Units Date/Time    CT Head Without Contrast [[id number]] Collected:  09/29/19 1810     Updated:  09/30/19 0827    Narrative:       EXAMINATION: CT HEAD WO CONTRAST - 09/29/2019     INDICATION: Vertigo, nausea and vomiting.      TECHNIQUE: 5 mm unenhanced images through the brain.     The radiation dose reduction device was turned on for each scan per the  ALARA (As Low as Reasonably Achievable) protocol.     COMPARISON: 04/27/2019 head CT scan.     FINDINGS: History indicates vertigo x 2 days, nausea and vomiting. The  calvarium appears intact. Included paranasal sinuses and mastoids appear  clear except for an approximately 2.5 cm left maxillary sinus mucous  cyst. There is focal dense edema in the inferior right cerebellum and  much milder focal edema in the inferior left cerebellum, consistent with  recent or subacute infarcts. There is probably involvement of the  inferior cerebellar vermis as well. There appear to be a couple of  punctate old lacunar infarcts in the right basal ganglia. There is no  evidence of edema/infarct elsewhere, no evidence of hemorrhage,  hydrocephalus, mass or mass effect or abnormal extra-axial collection.        Impression:       1. Inferior cerebellar edema, right  greater than left, most likely  representing subacute infarcts. No evidence of hemorrhage.  2. Old lacunar infarct in the right basal ganglia.  3. No evidence of acute intracranial disease elsewhere.     DICTATED:   2019  EDITED/ls :   2019      This report was finalized on 2019 8:24 AM by DR. Valentino Lo MD.       MRI Brain Without Contrast [[id number]] Collected:  19     Updated:  19    Narrative:       MRI Brain WO    INDICATION:   Dizziness for the past 2 days with nausea. Evaluate for stroke.    TECHNIQUE:   MRI of the brain without IV contrast.    COMPARISON:    None available.    FINDINGS:  Diffusion-weighted images show no evidence of recent infarct. The routine brain images demonstrate encephalomalacia in the right posterior inferior cerebellar artery distribution consistent with an old infarct. There is no evidence of mass lesion  hemorrhage or edema. Generalized atrophy is seen and there are moderately severe chronic ischemic changes around the ventricles bilaterally. Extra-axial structures are unremarkable.      Impression:       Chronic infarct right inferior cerebellum. Chronic ischemic changes around the ventricles. No acute findings.    Signer Name: Ricardo Fowler MD   Signed: 2019 9:44 PM   Workstation Name: [id number]    [facility]            Impression:   1.  Infected sebaceous cyst-s/p excision , with Proteus on culture.  She is clinically improved and there is no indication for antibiotic therapy at the present time.  She has no cellulitis, no residual fluctuance to suggest residual abscess and no purulent drainage.  2.  ESRD on HD  3.  Chronic Diastolic heart failure  4.  Diabetes Mellitus, type 2    PLAN/RECOMMENDATIONS:   Thank you for asking us to see Joy Bueno, I recommend the followin.  Monitor off antibiotics  2.  Continue wound care    Dr. Olivo has obtained the history, performed the physical exam and  formulated the above treatment plan.      I discussed her situation with Dr. Gary.  She should be able to discharge tomorrow.  I will sign off    Velasquez Olivo MD  10/1/2019  6:39 PM

## 2019-10-01 NOTE — PROGRESS NOTES
"   LOS: 2 days    Patient Care Team:  Goldy Dior MD as PCP - General (Internal Medicine)    Reason For Visit:    Subjective   Patient seen/examined.       Review of Systems:    Pulm: No soa   CV:  No CP      Objective       aspirin 81 mg Oral Daily   atorvastatin 80 mg Oral Nightly   budesonide-formoterol 2 puff Inhalation BID - RT   calcitriol 0.25 mcg Oral Daily   carvedilol 25 mg Oral BID With Meals   insulin lispro 0-7 Units Subcutaneous 4x Daily With Meals & Nightly   isosorbide mononitrate 30 mg Oral Daily   melatonin 5 mg Oral Nightly   multivitamin 1 tablet Oral Daily   pantoprazole 40 mg Oral Daily   saccharomyces boulardii 250 mg Oral Daily   sennosides-docusate sodium 2 tablet Oral Nightly   sertraline 50 mg Oral Daily   sevelamer 800 mg Oral TID With Meals   sodium chloride 10 mL Intravenous Q12H            Vital Signs:  Blood pressure (!) 184/93, pulse 69, temperature 97.7 °F (36.5 °C), temperature source Oral, resp. rate 18, height 162.6 cm (64.02\"), weight 130 kg (286 lb 9.6 oz), SpO2 100 %.    Flowsheet Rows      First Filed Value   Admission Height  162.6 cm (64.02\") Documented at 09/29/2019 1539   Admission Weight  130 kg (286 lb 9.6 oz) Documented at 09/29/2019 1539          09/30 0701 - 10/01 0700  In: -   Out: 2960 [Urine:700]    Physical Exam:    Constitutional: No acute distress, awake, alert, obese   HENT: NCAT, mucous membranes moist  Respiratory: Clear to auscultation bilaterally, respiratory effort normal   Cardiovascular: RRR, no murmurs, rubs, or gallops, palpable pedal pulses bilaterally  Gastrointestinal: Positive bowel sounds, soft, nontender, nondistended  Musculoskeletal: No bilateral ankle edema R tunneled HD catheter, L failed avf  Psychiatric: Appropriate affect, cooperative  Neurologic: Oriented x 3  Radiology:      Renal Imaging:        Labs:  Results from last 7 days   Lab Units 10/01/19  0514 09/30/19  0538 09/29/19  1647   WBC 10*3/mm3 8.28 7.63 6.25   HEMOGLOBIN " g/dL 11.0* 11.0* 10.8*   HEMATOCRIT % 38.3 38.0 37.3   PLATELETS 10*3/mm3 238 261 247     Results from last 7 days   Lab Units 10/01/19  0514 09/30/19  0538 09/29/19  1647   SODIUM mmol/L 138 139 137   POTASSIUM mmol/L 4.5 4.5 4.8   CHLORIDE mmol/L 103 102 101   CO2 mmol/L 22.0 22.0 22.0   BUN mg/dL 43* 64* 62*   CREATININE mg/dL 3.74* 4.91* 4.33*   CALCIUM mg/dL 8.5* 8.9 8.5*   MAGNESIUM mg/dL  --  1.7  --    ALBUMIN g/dL 2.80*  --  3.30*     Results from last 7 days   Lab Units 10/01/19  0514   GLUCOSE mg/dL 277*       Results from last 7 days   Lab Units 10/01/19  0514   ALK PHOS U/L 121*   BILIRUBIN mg/dL 0.4   ALT (SGPT) U/L 6   AST (SGOT) U/L 9                 Estimated Creatinine Clearance: 19.3 mL/min (A) (by C-G formula based on SCr of 3.74 mg/dL (H)).      Assessment       Cerebellar infarct (CMS/[facility])    Essential hypertension    Morbid obesity (CMS/[facility])    Anxiety    Type 2 diabetes mellitus with chronic kidney disease on chronic dialysis, with long-term current use of insulin (CMS/[facility])    ESRD (end stage renal disease) (CMS/[facility])    Chronic diastolic heart failure (CMS/[facility])    Abscess of back s/p drainage as outpatient 8/22    History of deep vein thrombosis (DVT) of brachial vein of left upper extremity (CMS/[facility])    Anticoagulated on Coumadin    Supratherapeutic INR    Hemodialysis patient (CMS/[facility])    Excoriation of buttock    Dizziness            Impression:        1- ESRD - MWF   2- HTn - not controlled.   3- Anemia of chronic disease. At target  4- CKD MBD        Recommendations:   HD again in AM   Renally dose meds  UF as tolerated  Would add lisinopril to help with BP if ok with neuro      Lila Casillas DO  10/01/19  10:34 AM

## 2019-10-01 NOTE — DISCHARGE PLACEMENT REQUEST
"Pam Loyd (69 y.o. Female)   Song Marquez, RN Case Manager  [phone]    Date of Birth Social Security Number Address Home Phone MRN    1950  [address] [id number] [phone] [id number]    Orthodox Marital Status          None        Admission Date Admission Type Admitting Provider Attending Provider Department, Room/Bed    9/29/19 Emergency Tri Gary MD Anciro, Audree, MD [facility] 3F, S313/1    Discharge Date Discharge Disposition Discharge Destination                       Attending Provider:  Tri Gary MD    Allergies:  Geodon [Ziprasidone Hcl], Haldol [Haloperidol Lactate], Seroquel [Quetiapine Fumarate]    Isolation:  None   Infection:  None   Code Status:  No CPR    Ht:  162.6 cm (64.02\")   Wt:  130 kg (286 lb 9.6 oz)    Admission Cmt:  None   Principal Problem:  Cerebellar infarct (CMS/HCC) [I63.9]                 Active Insurance as of 9/29/2019     Primary Coverage     Payor Plan Insurance Group Employer/Plan Group    MEDICARE MEDICARE A & B      Payor Plan Address Payor Plan Phone Number Payor Plan Fax Number Effective Dates    [address] [phone]  5/1/2016 - None Entered    [facility] [id number]       Subscriber Name Subscriber Birth Date Member ID       PAM LOYD 1950 [id number]           Secondary Coverage     Payor Plan Insurance Group Employer/Plan Group    [place]      Payor Plan Address Payor Plan Phone Number Payor Plan Fax Number Effective Dates    [address] [phone]  8/2/2018 - None Entered    [facility] [id number]       Subscriber Name Subscriber Birth Date Member ID       PAM LOYD 1950 [id number]                 Emergency Contacts      (Rel.) Home Phone Work Phone Mobile Phone    Tessie Dorado (Daughter) [phone] -- [phone]               History & Physical      Matthew Ackerman MD at 09/29/19 1910              [facility] " "[facility] Medicine Services  HISTORY AND PHYSICAL    Patient Name: Joy Bueno  : 1950  MRN: [id number]  Primary Care Physician: Goldy Dior MD  Date of admission: 2019      Subjective   Subjective     Chief Complaint:  Dizziness and rectal pain     HPI:  Joy Bueno is a 69 y.o. female who resides at [facility] w/ a hx of T2DM, HTN, HLD, chronic DHF, ESRD on HD M/W/F, remote DVT on coumadin who presented to the ED w/ c/o dizziness and rectal pain.  Pt reports that for the past week she has had rectal and buttocks pain associated w/ a wound or excoriation. She reports a hx of hemorrhoids w/ use of \"a cream\" but reports this is a different type of discomfort.   Pt also c/o dizziness and lightheadedness that started this morning. She describes that throughout the day she has intermittently felt like the \"room was spinning\". She reports that the dizziness is worse when being turned to her right side. Pt is bed-bound.   Pt denies fever/chills, chest pain, dyspnea, cough, N/V/D, abdominal pain, dysuria, edema, syncope, confusion, unilateral weakness, slurred speech, headache, visual changes, facial droop.  Pt last received hemodialysis on Friday.   Pt evaluated in the ED. Ct scan concerning for CVA and INR elevated at 4.33. Pt admitted to the hospital medicine service for further evaluation.     Pt recently admitted from 19 through 9/3/19 2/2 UTI. Urine culture positive for E.Coli.       Review of Systems   Constitutional: Negative.    HENT: Negative.    Eyes: Negative.    Respiratory: Negative.    Cardiovascular: Negative.    Gastrointestinal: Negative.    Endocrine: Negative.    Genitourinary: Negative.    Musculoskeletal: Negative.    Skin:        Excortication and irritation- rectum and buttocks; x 1 week. Associated pain and discomfort.     Allergic/Immunologic: Negative.    Neurological: Positive for dizziness and light-headedness. Negative for " tremors, seizures, syncope, facial asymmetry, speech difficulty, weakness, numbness and headaches.        Dizziness & lightheadedness; worse when turning the the right. Onset this am.    Hematological: Negative.    Psychiatric/Behavioral: Negative.    All other systems reviewed and are negative.       All other systems reviewed and are negative.     Personal History     Past Medical History:   Diagnosis Date   • Acute on chronic diastolic heart failure (CMS/HCC)    • Acute on chronic heart failure (CMS/HCC)    • Acute respiratory failure with hypercapnia (CMS/HCC) 4/27/2019   • Anemia    • Anxiety    • Asthma    • Bilateral leg pain 10/4/2017   • Chest wall abscess 4/5/2019   • Chronic diastolic heart failure (CMS/HCC)    • Chronic kidney disease    • Diabetes mellitus (CMS/[facility])    • Diabetes mellitus, type II (CMS/HCC) 8/3/2018   • Elevated troponin 9/1/2019   • Encephalopathy, metabolic 4/27/2019   • Generalized weakness 7/31/2019   • History of Clostridium difficile infection 8/3/2018   • History of respiratory failure, since off home oxygen 8/3/2018   • History of UTI 8/3/2018   • Hyperkalemia 7/31/2019   • Hypertension    • Morbid obesity (CMS/[facility])    • Osteoarthritis    • Sleep apnea    • Slow transit constipation 2/20/2019   • Tinea capitis 8/3/2018   • URI (upper respiratory infection) 4/5/2019   • Urinary tract infection due to extended-spectrum beta lactamase (ESBL)-producing Klebsiella 2/20/2019   • Urinary tract infection without hematuria 9/1/2019   • UTI (urinary tract infection), bacterial 10/4/2017   • Volume overload 4/6/2019       Past Surgical History:   Procedure Laterality Date   • ARM LESION/CYST EXCISION N/A 8/22/2019    Procedure: ABSCESS DRAINAGE X2 ON BACK;  Surgeon: Carlos Enrique Calderón MD;  Location: [facility];  Service: General   • ARTERIOVENOUS FISTULA/SHUNT SURGERY Left 10/22/2018    Procedure: LEFT UPPER EXTREMITY ARTERIOVENOUS FISTULA CREATION;  Surgeon: Carlos Enrique Calderón,  MD;  Location:  [facility];  Service: Vascular   •  SECTION     • COLONOSCOPY N/A 2017    Procedure: COLONOSCOPY;  Surgeon: Mark I Brunner, MD;  Location:  [facility];  Service:    • ENDOSCOPY N/A 2017    Procedure: ESOPHAGOGASTRODUODENOSCOPY ;  Surgeon: Velasquez Call MD;  Location:  [facility];  Service:    • HERNIA REPAIR     • INSERTION HEMODIALYSIS CATHETER Right 10/17/2018    Procedure: HEMODIALYSIS CATHETER INSERTION;  Surgeon: Carlos Enrique Calderón MD;  Location:  [facility];  Service: General       Family History: family history includes Cardiomyopathy in her mother; Diabetes in her father; Stroke in her father. Otherwise pertinent FHx was reviewed and unremarkable.     Social History:  reports that she has quit smoking. She smoked 0.25 packs per day. She has never used smokeless tobacco. She reports that she does not drink alcohol or use drugs.  Social History     Social History Narrative    Mrs. Bueno is a 68 year old AA  female. She lives alone in [place] but has been in rehab for some time. She has a daughter. She does not have an advanced directive.       Medications:    Available home medication information reviewed.  Medications Prior to Admission   Medication Sig Dispense Refill Last Dose   • acetaminophen (TYLENOL) 325 MG tablet Take 650 mg by mouth Every 6 (Six) Hours As Needed for Mild Pain .   2019 at Unknown time   • albuterol (PROVENTIL HFA;VENTOLIN HFA) 108 (90 BASE) MCG/ACT inhaler Inhale 2 puffs Every 4 (Four) Hours As Needed for Wheezing.   Unknown at Unknown time   • atorvastatin (LIPITOR) 10 MG tablet Take 10 mg by mouth Every Night.   2019 at 2100   • calcitriol (ROCALTROL) 0.25 MCG capsule Take 0.25 mcg by mouth Daily.   2019 at 0500   • carvedilol (COREG) 25 MG tablet Take 25 mg by mouth 2 (Two) Times a Day With Meals.   2019 at 0500   • diltiaZEM (CARDIZEM) 2% cream Apply  topically to the appropriate area as directed 3  (Three) Times a Day As Needed (Mix w/Lidocaine 2% oint and apply TID PRN to anal fissure).   Unknown at Unknown time   • docusate sodium (COLACE) 100 MG capsule Take 1 capsule by mouth 2 (Two) Times a Day.   8/22/2019 at 0500   • fluticasone-salmeterol (ADVAIR) 500-50 MCG/DOSE DISKUS Inhale 1 puff 2 (Two) Times a Day.   8/22/2019 at 0500   • HYDROcodone-acetaminophen (NORCO) 5-325 MG per tablet Take 1 tablet by mouth Every 8 (Eight) Hours As Needed for Moderate Pain  (Pain). 15 tablet 0    • insulin aspart (novoLOG FLEXPEN) 100 UNIT/ML solution pen-injector sc pen Inject 5 Units under the skin into the appropriate area as directed 3 (Three) Times a Day With Meals.   Unknown at Unknown time   • ipratropium-albuterol (DUO-NEB) 0.5-2.5 mg/3 ml nebulizer Take 3 mL by nebulization Every 6 (Six) Hours As Needed for Wheezing.   Unknown at Unknown time   • isosorbide mononitrate (IMDUR) 30 MG 24 hr tablet Take 30 mg by mouth Daily.   8/22/2019 at 0500   • Lidocaine 2 % gel Apply  topically 3 (Three) Times a Day As Needed (Mix w/LDiltiazem 2% cream and apply TID PRN to anal fissure).   Unknown at Unknown time   • melatonin 3 MG tablet Take 3 mg by mouth Every Night.   8/21/2019 at 2100   • Multiple Vitamin (TAB-A-STAR) tablet Take 1 tablet by mouth Daily.   Unknown at Unknown time   • ondansetron (ZOFRAN) 4 MG tablet Take 4 mg by mouth Every 8 (Eight) Hours As Needed for Nausea or Vomiting.   8/20/2019 at 1000   • pantoprazole (PROTONIX) 40 MG EC tablet Take 40 mg by mouth Daily.   8/22/2019 at 0500   • phenylephrine-cocoa Butter (PREPARATION H) 0.25-88.44 % suppository suppository Insert  into the rectum As Needed for Hemorrhoids.   Unknown at Unknown time   • polyethylene glycol (MIRALAX) pack packet Take 17 g by mouth 2 (Two) Times a Day.   8/22/2019 at 0500   • promethazine (PHENERGAN) 25 MG tablet Take 25 mg by mouth 3 (Three) Times a Week. On dialysis days (Mon Weds Fri)   8/21/2019 at 0500   • saccharomyces boulardii  (FLORASTOR) 250 MG capsule Take 250 mg by mouth Daily.      • sertraline (ZOLOFT) 50 MG tablet Take 50 mg by mouth Daily.   8/22/2019 at 0500   • sevelamer (RENVELA) 800 MG tablet Take 800 mg by mouth 3 (Three) Times a Day With Meals. 0800, 1200 & 1700   8/21/2019 at 1700   • vitamin D (ERGOCALCIFEROL) 55293 units capsule capsule Take 50,000 Units by mouth 1 (One) Time Per Week. TAKES ON WEDNESDAYS   Unknown at Unknown time   • warfarin (COUMADIN) 2.5 MG tablet Take 2.5 mg by mouth Daily. Take with 4mg for a total of 6.5mg      • warfarin (COUMADIN) 4 MG tablet Take 4 mg by mouth Daily. Take with 2.5mg for a total of 6.5mg          Allergies   Allergen Reactions   • Geodon [Ziprasidone Hcl] Unknown (See Comments)     PT DOESN'T REMEMBER   • Haldol [Haloperidol Lactate] Unknown (See Comments)     PT DOESN'T REMEMBER   • Seroquel [Quetiapine Fumarate] Unknown (See Comments)     PT DOESN'T REMEMBER       Objective   Objective     Vital Signs:   Temp:  [98.1 °F (36.7 °C)] 98.1 °F (36.7 °C)  Heart Rate:  [67-69] 69  Resp:  [20] 20  BP: (154)/(86) 154/86   Total (NIH Stroke Scale): 0    Physical Exam   Constitutional: She is oriented to person, place, and time. She appears well-developed and well-nourished. No distress.   HENT:   Head: Normocephalic and atraumatic.   Eyes: EOM are normal. Pupils are equal, round, and reactive to light.   Neck: Normal range of motion. Neck supple.   Cardiovascular: Normal rate, regular rhythm, normal heart sounds and intact distal pulses. Exam reveals no gallop and no friction rub.   No murmur heard.  Pulmonary/Chest: Effort normal and breath sounds normal. No stridor. No respiratory distress. She has no wheezes. She has no rales. She exhibits no tenderness.   Abdominal: Soft. Bowel sounds are normal. She exhibits no distension. There is no tenderness. There is no guarding.   Musculoskeletal: Normal range of motion. She exhibits no edema, tenderness or deformity.   Neurological: She is  alert and oriented to person, place, and time.   Pt oriented x4. Follows commands, engages in conversation; speech appropriate. No focal deficits noted. Pt c/o dizziness when being turned to her right side.    Skin: Skin is warm and dry. No rash noted. She is not diaphoretic. No erythema. No pallor.   Port- secure- right chest.   Rectum and perirectal area; excoriation surrounding rectum. Scant amount of serosanguinous drainage noted. No obvious abscess or open wound.    Nursing note and vitals reviewed.       Results Reviewed:  I have personally reviewed current lab and radiology data.    Results from last 7 days   Lab Units 09/29/19  1647   WBC 10*3/mm3 6.25   HEMOGLOBIN g/dL 10.8*   HEMATOCRIT % 37.3   PLATELETS 10*3/mm3 247   INR  4.31*     Results from last 7 days   Lab Units 09/29/19  1647   SODIUM mmol/L 137   POTASSIUM mmol/L 4.8   CHLORIDE mmol/L 101   CO2 mmol/L 22.0   BUN mg/dL 62*   CREATININE mg/dL 4.33*   GLUCOSE mg/dL 259*   CALCIUM mg/dL 8.5*   ALT (SGPT) U/L 7   AST (SGOT) U/L 10     Estimated Creatinine Clearance: 16.6 mL/min (A) (by C-G formula based on SCr of 4.33 mg/dL (H)).  Brief Urine Lab Results  (Last result in the past 365 days)      Color   Clarity   Blood   Leuk Est   Nitrite   Protein   CREAT   Urine HCG        09/01/19 1214 Yellow Turbid Moderate (2+) Large (3+) Negative >=300 mg/dL (3+)             Imaging Results (last 24 hours)     Procedure Component Value Units Date/Time    CT Head Without Contrast [[id number]] Collected:  09/29/19 1810     Updated:  09/29/19 1827    Narrative:       EXAMINATION: CT HEAD WO CONTRAST - 09/29/2019     INDICATION: Vertigo, nausea and vomiting.      TECHNIQUE: 5 mm unenhanced images through the brain.     The radiation dose reduction device was turned on for each scan per the  ALARA (As Low as Reasonably Achievable) protocol.     COMPARISON: 04/27/2019 head CT scan.     FINDINGS: History indicates vertigo x 2 days, nausea and vomiting. The  calvarium  appears intact. Included paranasal sinuses and mastoids appear  clear except for an approximately 2.5 cm left maxillary sinus mucous  cyst. There is focal dense edema in the inferior right cerebellum and  much milder focal edema in the inferior left cerebellum, consistent with  recent or subacute infarcts. There is probably involvement of the  inferior cerebellar vermis as well. There appear to be a couple of  punctate old lacunar infarcts in the right basal ganglia. There is no  evidence of edema/infarct elsewhere, no evidence of hemorrhage,  hydrocephalus, mass or mass effect or abnormal extra-axial collection.        Impression:       1. Inferior cerebellar edema, right greater than left, most likely  representing subacute infarcts. No evidence of hemorrhage.  2. Old lacunar infarct in the right basal ganglia.  3. No evidence of acute intracranial disease elsewhere.     DICTATED:   09/29/2019  EDITED/ls :   09/29/2019            Results for orders placed during the hospital encounter of 09/02/17   Adult Transthoracic Echo Complete    Narrative · Left ventricular wall thickness is consistent with mild concentric   hypertrophy.  · Left atrial cavity size is mildly dilated.  · Mild mitral valve regurgitation is present  · calcification of the aortic valve  · Mild tricuspid valve regurgitation is present.          Assessment/Plan   Assessment / Plan     Active Hospital Problems    Diagnosis POA   • **Cerebellar infarct (CMS/[facility]) [I63.9] Yes   • Hemodialysis patient (CMS/[facility]) [Z99.2] Not Applicable     Monday, Wednesday, Friday      • Excoriation of buttock [S30.810A] Yes   • Dizziness [R42] Yes   • Supratherapeutic INR [R79.1] Yes   • Abscess of back s/p drainage as outpatient 8/22 [L02.212] Yes   • Anticoagulated on Coumadin [Z51.81, Z79.01] Not Applicable   • History of deep vein thrombosis (DVT) of brachial vein of left upper extremity (CMS/[facility]) [I82.622] Yes     Dx February 2019     • Chronic diastolic heart  "failure (CMS/[facility]) [I50.32] Yes   • ESRD (end stage renal disease) (CMS/[facility]) [N18.6] Yes   • Type 2 diabetes mellitus with chronic kidney disease on chronic dialysis, with long-term current use of insulin (CMS/[facility]) [E11.22, N18.6, Z99.2, Z79.4] Not Applicable   • Essential hypertension [I10] Yes   • Morbid obesity (CMS/[facility]) [E66.01] Yes   • Anxiety [F41.9] Yes       Assessment & Plan    **CVA, dizziness   -pt c/o \"dizziness and lightheadedness\"; onset this am; worse when being turned to her right side  -CT head: Inferior cerebellar edema, right greater than left, most likely  representing subacute infarcts. No evidence of hemorrhage.Old lacunar infarct in the right basal ganglia. No evidence of acute intracranial disease elsewhere.  -MRI brain pending  -ECHO ordered for tomorrow am   -bilateral carotid duplex ordered for tomorrow am   -pt/ot/speech consult in am  -bedside dysphagia screen  -high dose statin tonight  -held ASA 2/2 risk for bleeding as INR is >4  -lipid panel, ths, hem a1c, cbc, bmp, I.solo and inr- am labs  -holding routine coumadin for now; INR >4; Dr Campbell consulted by ED physician, documentation noted for poss \"Coumadin failure.\" Pt reports compliance all recommended medications. According to records here, she has had multiple instances where INR's were subtherapeutic. Neuro recommends Eliquis, but since INR is still supratherapeutic, will hold off for now; awaiting formal neuro eval.  -continuous telemetry  -baseline EKG pending  -holding antihypertensives   -Neurology to see in am    **Excoriation of buttocks  -WOC consult in am    **Supratherapeutic INR  -holding daily coumadin   -pt on anticoagulation 2/2 DVT (L brachial vein, dx'ed in Feb 2019)  -repeat INR in am  -restart coumadin or change anticoagulation per Neurology recommendations   -monitor for bleeding; H/H stable- repeat CBC in am    **T2DM, insulin dependent and had been well controlled previously  -hem a1c w/ am labs  -holding " novolog (5 units TID) for now; placed on LDSS q ac/hs  -FSBG q ac/hs    **ESRD on HD  -HD M/W/F; did get dialysis on Friday  -CR at baseline; previously 4.15 to 4.4  -UA pending (recent UTI- E. Coli on 9/1/19)  -Nephrology consulted to see in am    **HTN  -stable  -holding routine coreg 2/2 acute CVA    Chronic constipation  -last BM was yesterday, but pt reports very hard stool and pain during BM  -bowel care regimen    DVT prophylaxis:  Coumadin on hold, INR supratherapeutic    CODE STATUS:    Code Status and Medical Interventions:   Ordered at: 09/29/19 1958     Level Of Support Discussed With:    Patient     Code Status:    No CPR     Medical Interventions (Level of Support Prior to Arrest):    Full       Admission Status:  I believe this patient meets INPATIENT status due to acute-subacute CVA while on Coumadin with supratherapeutic INR, will need further monitoring and work up with inpatient neurology consultation.  I feel patient’s risk for adverse outcomes and need for care warrant INPATIENT evaluation and I predict the patient’s care encounter to likely last beyond 2 midnights.        Electronically signed by SUELLEN Li, 09/29/19, 7:10 PM.        Brief Attending Admission Attestation     I have seen and examined the patient, performing an independent face-to-face diagnostic evaluation with plan of care reviewed and developed with the advanced practice clinician (APC).      Brief Summary Statement:   Joy Bueno is a 69 y.o. female who has been residing at [facility] for little bit over a year.  She is mainly bedbound and requires assistance to get out of bed.  Her other significant past medical history includes morbid obesity, insulin-dependent type 2 diabetes, hypertension, hyperlipidemia, obstructive sleep apnea, chronic diastolic heart failure, remote left upper extremity DVT in February 2019- on Coumadin for anticoagulation, history of prior ESBL UTI, prior C. difficile  infection in August 2018, and end-stage renal disease-on hemodialysis 3 times per week, who was brought into the hospital for evaluation of rectal pain.  She reports history of chronic constipation despite being on bowel regimen daily.  She reported that her last bowel movement was yesterday, which she described stool as very hard and she had a lot of pain during her BM.  While in the ED, she also reported vertigo with associated nausea and vomiting.  She reported that her vertigo started last week, on Friday, but then his symptoms improved.  This morning, she experienced vertigo again while nursing home staff were turning her.  She denies any cough, shortness of air, chest pain, palpitations, visual changes, or dysuria.  She denies any hematochezia or melena.  She reports some right sided headache at this time.  She does not know if she has any gait instability since she does not get out of bed without assistance or ambulate.    Basic blood work in the emergency department were unremarkable. UA not performed-pt asymptomatic. EKG showed sinus rhythm without any acute ischemic changes. CT head showed edema of the cerebellum-R>L, consistent with poss subacute CVA.    emainder of detailed HPI is as noted above and has been reviewed and/or edited by me for completeness.      Attending Physical Exam:  General Assessment: No acute cardiopulmonary distress. Well developed and well nourished.    HEENT: NCAT, PERRL, MM moist    Neck: Supple, no carotid bruit bilat    CVS: RRR, S1S2 normal, no murmurs    Resp: Good air movements bilat, mild basilar rales, no wheezing, resp non-labored    Abd: Obese, soft, NT, ND, normal BS, no guarding or peritoneal signs    Ext: Trace edema, both calves are symmetric and NTTP    Neuro: CN II-XII intact, sensation intact, strength 5/5 in both UE and 4/5 in both LE,  strength symmetric and WNL.    Skin: W/D/I.     Psych: Affect is appropriate          Brief Assessment/Plan :  See above for  further detailed assessment and plan developed with APC which I have reviewed and/or edited for completeness.      Electronically signed by Matthew Ackerman MD, 09/29/19, 8:40 PM.           Electronically signed by Matthew Ackerman MD at 09/29/19 2056       Vital Signs (last day)     Date/Time   Temp   Temp src   Pulse   Resp   BP   Patient Position   SpO2    10/01/19 1102   97.5 (36.4)   Oral   69   18   172/92   Lying   100    10/01/19 0941   --   --   --   --   184/93  (Abnormal)    --   --    10/01/19 0655   --   --   69   --   184/93  (Abnormal)    --   --    10/01/19 0653   97.7 (36.5)   Oral   74   18   187/95  (Abnormal)    Lying   100    10/01/19 0422   97.5 (36.4)   Oral   78   20   164/87   Lying   100    09/30/19 2330   98.4 (36.9)   Oral   72   20   149/68   Lying   97    09/30/19 1922   97.8 (36.6)   Oral   75   18   153/71   Lying   99    09/30/19 1429   --   --   76   18   --   Lying   99    09/30/19 1427   --   --   --   --   159/94   --   --    09/30/19 1407   98.1 (36.7)   Oral   75   18   158/87   Lying   --    09/30/19 1400   --   --   75   --   147/92   --   --    09/30/19 1330   --   --   72   --   138/90   --   --    09/30/19 1300   --   --   72   --   146/79   --   --    09/30/19 1230   --   --   72   --   146/82   --   --    09/30/19 1200   --   --   75   --   187/80  (Abnormal)    --   --    09/30/19 1130   --   --   75   --   187/90  (Abnormal)    --   --    09/30/19 1100   97.1 (36.2)   Oral   75   18   179/78   Lying   --    09/30/19 1000   97 (36.1)   --   --   --   --   --   --    09/30/19 0700   97.5 (36.4)   Oral   77   18   196/91  (Abnormal)    Lying   99    09/30/19 0400   97.4 (36.3)   Oral   67   18   183/89  (Abnormal)    Lying   98              Hospital Medications (active)       Dose Frequency Start End    acetaminophen (TYLENOL) tablet 650 mg 650 mg Every 6 Hours PRN 9/29/2019     Sig - Route: Take 2 tablets by mouth Every 6 (Six) Hours As Needed for Mild Pain  . - Oral    albumin human 25 % IV SOLN 12.5 g 12.5 g As Needed 9/30/2019 10/1/2019    Sig - Route: Infuse 50 mL into a venous catheter As Needed (Hypotension During Dialysis). - Intravenous    aspirin chewable tablet 81 mg 81 mg Daily 9/30/2019     Sig - Route: Chew 1 tablet Daily. - Oral    atorvastatin (LIPITOR) tablet 80 mg 80 mg Nightly 9/29/2019     Sig - Route: Take 2 tablets by mouth Every Night. - Oral    bisacodyl (DULCOLAX) suppository 10 mg 10 mg Daily PRN 9/29/2019     Sig - Route: Insert 1 suppository into the rectum Daily As Needed for Constipation. - Rectal    budesonide-formoterol (SYMBICORT) 160-4.5 MCG/ACT inhaler 2 puff 2 puff 2 Times Daily - RT 9/29/2019     Sig - Route: Inhale 2 puffs 2 (Two) Times a Day. - Inhalation    calcitriol (ROCALTROL) capsule 0.25 mcg 0.25 mcg Daily 9/30/2019     Sig - Route: Take 1 capsule by mouth Daily. - Oral    carvedilol (COREG) tablet 25 mg 25 mg 2 Times Daily With Meals 10/1/2019     Sig - Route: Take 2 tablets by mouth 2 (Two) Times a Day With Meals. - Oral    dextrose (D50W) 25 g/ 50mL Intravenous Solution 25 g 25 g Every 15 Minutes PRN 9/29/2019     Sig - Route: Infuse 50 mL into a venous catheter Every 15 (Fifteen) Minutes As Needed for Low Blood Sugar (Blood Sugar Less Than 70). - Intravenous    dextrose (GLUTOSE) oral gel 15 g 15 g Every 15 Minutes PRN 9/29/2019     Sig - Route: Take 15 application by mouth Every 15 (Fifteen) Minutes As Needed for Low Blood Sugar (Blood sugar less than 70). - Oral    glucagon (human recombinant) (GLUCAGEN DIAGNOSTIC) injection 1 mg 1 mg Every 15 Minutes PRN 9/29/2019     Sig - Route: Inject 1 mg under the skin into the appropriate area as directed Every 15 (Fifteen) Minutes As Needed for Low Blood Sugar (Blood Glucose Less Than 70). - Subcutaneous    heparin (porcine) injection 1,800 Units 1,800 Units As Needed 9/30/2019     Sig - Route: 1.8 mL by Intracatheter route As Needed (For Dialysis Catheter Care.). - Intracatheter  "   HYDROcodone-acetaminophen (NORCO) 5-325 MG per tablet 2 tablet 2 tablet Every 8 Hours PRN 9/30/2019     Sig - Route: Take 2 tablets by mouth Every 8 (Eight) Hours As Needed for Moderate Pain  (Pain). - Oral    insulin lispro (humaLOG) injection 0-7 Units 0-7 Units 4 Times Daily With Meals & Nightly 9/29/2019     Sig - Route: Inject 0-7 Units under the skin into the appropriate area as directed 4 (Four) Times a Day With Meals & at Bedtime. - Subcutaneous    ipratropium-albuterol (DUO-NEB) nebulizer solution 3 mL 3 mL Every 6 Hours PRN 9/29/2019     Sig - Route: Take 3 mL by nebulization Every 6 (Six) Hours As Needed for Wheezing. - Nebulization    isosorbide mononitrate (IMDUR) 24 hr tablet 30 mg 30 mg Daily 10/1/2019     Sig - Route: Take 1 tablet by mouth Daily. - Oral    meclizine (ANTIVERT) tablet 12.5 mg 12.5 mg 3 Times Daily PRN 10/1/2019     Sig - Route: Take 1 tablet by mouth 3 (Three) Times a Day As Needed for dizziness. - Oral    melatonin tablet 5 mg 5 mg Nightly 9/29/2019     Sig - Route: Take 1 tablet by mouth Every Night. - Oral    multivitamin (THERAGRAN) tablet 1 tablet 1 tablet Daily 9/30/2019     Sig - Route: Take 1 tablet by mouth Daily. - Oral    ondansetron (ZOFRAN) injection 4 mg 4 mg Every 6 Hours PRN 9/29/2019     Sig - Route: Infuse 2 mL into a venous catheter Every 6 (Six) Hours As Needed for Nausea or Vomiting. - Intravenous    Linked Group 1:  \"Or\" Linked Group Details        ondansetron (ZOFRAN) tablet 4 mg 4 mg Every 6 Hours PRN 9/29/2019     Sig - Route: Take 1 tablet by mouth Every 6 (Six) Hours As Needed for Nausea or Vomiting. - Oral    Linked Group 1:  \"Or\" Linked Group Details        pantoprazole (PROTONIX) EC tablet 40 mg 40 mg Daily 9/30/2019     Sig - Route: Take 1 tablet by mouth Daily. - Oral    polyethylene glycol 3350 powder (packet) 17 g 2 Times Daily PRN 9/29/2019     Sig - Route: Take 17 g by mouth 2 (Two) Times a Day As Needed for Constipation. - Oral    saccharomyces " boulardii (FLORASTOR) capsule 250 mg 250 mg Daily 2019     Sig - Route: Take 1 capsule by mouth Daily. - Oral    sennosides-docusate sodium (SENOKOT-S) 8.6-50 MG tablet 2 tablet 2 tablet Nightly 2019     Sig - Route: Take 2 tablets by mouth Every Night. - Oral    sertraline (ZOLOFT) tablet 50 mg 50 mg Daily 2019     Sig - Route: Take 2 tablets by mouth Daily. - Oral    sevelamer (RENVELA) packet 0.8 g 800 mg 3 Times Daily With Meals 2019     Sig - Route: Take 1 packet by mouth 3 (Three) Times a Day With Meals. - Oral    sodium chloride 0.9 % flush 10 mL 10 mL Every 12 Hours Scheduled 2019     Sig - Route: Infuse 10 mL into a venous catheter Every 12 (Twelve) Hours. - Intravenous    sodium chloride 0.9 % flush 10 mL 10 mL As Needed 2019     Sig - Route: Infuse 10 mL into a venous catheter As Needed for Line Care. - Intravenous    HYDROcodone-acetaminophen (NORCO) 5-325 MG per tablet 1 tablet (Discontinued) 1 tablet Every 8 Hours PRN 2019    Sig - Route: Take 1 tablet by mouth Every 8 (Eight) Hours As Needed for Moderate Pain  (Pain). - Oral             Physician Progress Notes (last 24 hours) (Notes from 19 1115 through 10/01/19 1115)      Tri Gary MD at 19 1309              [facility] Services  PROGRESS NOTE    Patient Name: Joy Bueno  : 1950  MRN: [id number]    Date of Admission: 2019  Primary Care Physician: Goldy Dior MD    Subjective   Subjective     CC:  Back pain    HPI:  No acute events overnight. Currently having some serious low back pain that is uncontrolled while on norco. She continues to have dizziness associated with nausea and decreased appetite. Has not had emesis since Friday night.     Review of Systems  Gen- No fevers, chills  CV- No chest pain, palpitations  Resp- No cough, dyspnea    All other systems reviewed and negative.     Objective   Objective     Vital Signs:   Temp:   [97 °F (36.1 °C)-98.1 °F (36.7 °C)] 97.1 °F (36.2 °C)  Heart Rate:  [67-77] 72  Resp:  [18-20] 18  BP: (146-196)/(78-91) 146/82  Total (NIH Stroke Scale): 0     Physical Exam:  Constitutional: No acute distress, awake, alert, obese   HENT: NCAT, mucous membranes moist  Respiratory: Clear to auscultation bilaterally, respiratory effort normal   Cardiovascular: RRR, no murmurs, rubs, or gallops, palpable pedal pulses bilaterally  Gastrointestinal: Positive bowel sounds, soft, nontender, nondistended  Musculoskeletal: No bilateral ankle edema  Psychiatric: Appropriate affect, cooperative  Neurologic: Oriented x 3, Cranial Nerves grossly intact to confrontation, speech clear  Skin: per nusring has large wound on buttock with vesicles/bullae up her back, will need to assess myself     Results Reviewed:    Results from last 7 days   Lab Units 09/30/19  0538 09/29/19  1647   WBC 10*3/mm3 7.63 6.25   HEMOGLOBIN g/dL 11.0* 10.8*   HEMATOCRIT % 38.0 37.3   PLATELETS 10*3/mm3 261 247   INR  4.33* 4.31*     Results from last 7 days   Lab Units 09/30/19  0538 09/29/19  1647   SODIUM mmol/L 139 137   POTASSIUM mmol/L 4.5 4.8   CHLORIDE mmol/L 102 101   CO2 mmol/L 22.0 22.0   BUN mg/dL 64* 62*   CREATININE mg/dL 4.91* 4.33*   GLUCOSE mg/dL 234* 259*   CALCIUM mg/dL 8.9 8.5*   ALT (SGPT) U/L  --  7   AST (SGOT) U/L  --  10     Estimated Creatinine Clearance: 14.7 mL/min (A) (by C-G formula based on SCr of 4.91 mg/dL (H)).    Microbiology Results Abnormal     None          Imaging Results (last 24 hours)     Procedure Component Value Units Date/Time    CT Head Without Contrast [[id number]] Collected:  09/29/19 1810     Updated:  09/30/19 0827    Narrative:       EXAMINATION: CT HEAD WO CONTRAST - 09/29/2019     INDICATION: Vertigo, nausea and vomiting.      TECHNIQUE: 5 mm unenhanced images through the brain.     The radiation dose reduction device was turned on for each scan per the  ALARA (As Low as Reasonably Achievable)  protocol.     COMPARISON: 04/27/2019 head CT scan.     FINDINGS: History indicates vertigo x 2 days, nausea and vomiting. The  calvarium appears intact. Included paranasal sinuses and mastoids appear  clear except for an approximately 2.5 cm left maxillary sinus mucous  cyst. There is focal dense edema in the inferior right cerebellum and  much milder focal edema in the inferior left cerebellum, consistent with  recent or subacute infarcts. There is probably involvement of the  inferior cerebellar vermis as well. There appear to be a couple of  punctate old lacunar infarcts in the right basal ganglia. There is no  evidence of edema/infarct elsewhere, no evidence of hemorrhage,  hydrocephalus, mass or mass effect or abnormal extra-axial collection.        Impression:       1. Inferior cerebellar edema, right greater than left, most likely  representing subacute infarcts. No evidence of hemorrhage.  2. Old lacunar infarct in the right basal ganglia.  3. No evidence of acute intracranial disease elsewhere.     DICTATED:   09/29/2019  EDITED/ls :   09/29/2019      This report was finalized on 9/30/2019 8:24 AM by DR. Valentino Lo MD.       MRI Brain Without Contrast [[id number]] Collected:  09/29/19 2144     Updated:  09/29/19 2146    Narrative:       MRI Brain WO    INDICATION:   Dizziness for the past 2 days with nausea. Evaluate for stroke.    TECHNIQUE:   MRI of the brain without IV contrast.    COMPARISON:    None available.    FINDINGS:  Diffusion-weighted images show no evidence of recent infarct. The routine brain images demonstrate encephalomalacia in the right posterior inferior cerebellar artery distribution consistent with an old infarct. There is no evidence of mass lesion  hemorrhage or edema. Generalized atrophy is seen and there are moderately severe chronic ischemic changes around the ventricles bilaterally. Extra-axial structures are unremarkable.      Impression:       Chronic infarct right inferior  cerebellum. Chronic ischemic changes around the ventricles. No acute findings.    Signer Name: Ricardo Fowler MD   Signed: 9/29/2019 9:44 PM   Workstation Name: [id number]    [facility]          Results for orders placed during the hospital encounter of 09/02/17   Adult Transthoracic Echo Complete    Narrative · Left ventricular wall thickness is consistent with mild concentric   hypertrophy.  · Left atrial cavity size is mildly dilated.  · Mild mitral valve regurgitation is present  · calcification of the aortic valve  · Mild tricuspid valve regurgitation is present.          I have reviewed the medications:  Scheduled Meds:  aspirin 81 mg Oral Daily   atorvastatin 80 mg Oral Nightly   budesonide-formoterol 2 puff Inhalation BID - RT   calcitriol 0.25 mcg Oral Daily   insulin lispro 0-7 Units Subcutaneous 4x Daily With Meals & Nightly   melatonin 5 mg Oral Nightly   multivitamin 1 tablet Oral Daily   pantoprazole 40 mg Oral Daily   saccharomyces boulardii 250 mg Oral Daily   sennosides-docusate sodium 2 tablet Oral Nightly   sertraline 50 mg Oral Daily   sevelamer 800 mg Oral TID With Meals   sodium chloride 10 mL Intravenous Q12H     Continuous Infusions:   PRN Meds:.•  acetaminophen  •  bisacodyl  •  dextrose  •  dextrose  •  glucagon (human recombinant)  •  HYDROcodone-acetaminophen  •  ipratropium-albuterol  •  ondansetron **OR** ondansetron  •  polyethylene glycol  •  sodium chloride      Assessment/Plan   Assessment / Plan     Active Hospital Problems    Diagnosis  POA   • **Cerebellar infarct (CMS/HCC) [I63.9]  Yes   • Hemodialysis patient (CMS/HCC) [Z99.2]  Not Applicable   • Excoriation of buttock [S30.810A]  Yes   • Dizziness [R42]  Yes   • Supratherapeutic INR [R79.1]  Yes   • Abscess of back s/p drainage as outpatient 8/22 [L02.212]  Yes   • Anticoagulated on Coumadin [Z51.81, Z79.01]  Not Applicable   • History of deep vein thrombosis (DVT) of brachial vein of left upper  extremity (CMS/[facility]) [I82.622]  Yes   • Chronic diastolic heart failure (CMS/[facility]) [I50.32]  Yes   • ESRD (end stage renal disease) (CMS/[facility]) [N18.6]  Yes   • Type 2 diabetes mellitus with chronic kidney disease on chronic dialysis, with long-term current use of insulin (CMS/HCC) [E11.22, N18.6, Z99.2, Z79.4]  Not Applicable   • Essential hypertension [I10]  Yes   • Morbid obesity (CMS/[facility]) [E66.01]  Yes   • Anxiety [F41.9]  Yes      Resolved Hospital Problems   No resolved problems to display.        Brief Hospital Course to date:  Joy Bueno is a 69 y.o. 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 Shah (neurologist) & she has hx of cerebral infarct, believes symptoms are not new, but re-emerging due to current illness  -MRI brain with chronic infarct right inferior cerebellum, chronic ischemic changes around the ventricles no acute findings  -continue good blood pressure control  -echocardiogram pending  -neuro c/s-CT angiogram      Wound on back  - ID consult, does not appear overtly infected and wound may be chronic, but would appreciate their input on whether or  Not antibiotics are warranted  -wound care consult  -will continue pain control inc norco to 10 mg and morphine prn    Supratherapeutic INR  -on coumadin for hx of DVT diagnosed in 2/2019   -hold coumadin   -may need to change AC, will CTM INR daily    HTN: uncontrolled-permissive HTN     ESRD  -onHD MWF, last dialysis on Friday  -nephrology consult    T2DM: 8.8 pending continue low dose correction insulin, and monitor    DVT Prophylaxis:  Holding AC due to supratherapeutic INR    Disposition: I expect the patient to be discharged 2-3 days.    CODE STATUS:   Code Status and Medical Interventions:   Ordered at: 09/29/19 5301     Level Of Support Discussed With:    Patient     Code  Status:    No CPR     Medical Interventions (Level of Support Prior to Arrest):    Full         Electronically signed by Tri Gary MD, 19, 1:10 PM.        Electronically signed by Tri Gary MD at 19 1623       Physical Therapy Notes (last 24 hours) (Notes from 19 1115 through 10/01/19 1115)     No notes of this type exist for this encounter.           Occupational Therapy Notes (last 24 hours) (Notes from 19 1115 through 10/01/19 1115)      Sasha Dover, OT at 10/01/19 1013          Acute Care - Occupational Therapy Initial Eval/Discharge  [facility]     Patient Name: Joy Bueno  : 1950  MRN: [id number]  Today's Date: 10/1/2019  Onset of Illness/Injury or Date of Surgery: 19  Date of Referral to OT: 19         Admit Date: 2019       ICD-10-CM ICD-9-CM   1. Cerebellar infarct (CMS/[facility]) I63.9 434.91   2. Vertigo R42 780.4   3. Stage II skin ulcer, limited to breakdown of skin (CMS/[facility]) L98.491 707.9   4. Poisoning by warfarin sodium, accidental or unintentional, initial encounter T45.511A 964.2     E858.2   5. Cognitive communication deficit R41.841 799.52   6. Impaired mobility and ADLs Z74.09 799.89     Patient Active Problem List   Diagnosis   • Essential hypertension   • Morbid obesity (CMS/[facility])   • Anxiety   • Type 2 diabetes mellitus with chronic kidney disease on chronic dialysis, with long-term current use of insulin (CMS/[facility])   • History of UTI   • ESRD (end stage renal disease) (CMS/[facility])   • Chronic diastolic heart failure (CMS/[facility])   • Abscess of back s/p drainage as outpatient    • History of deep vein thrombosis (DVT) of brachial vein of left upper extremity (CMS/[facility])   • Anticoagulated on Coumadin   • Supratherapeutic INR   • Cerebellar infarct (CMS/[facility])   • Hemodialysis patient (CMS/[facility])   • Excoriation of buttock   • Dizziness     Past Medical History:   Diagnosis Date   • Acute on chronic diastolic heart failure (CMS/[facility])    •  Acute on chronic heart failure (CMS/[facility])    • Acute respiratory failure with hypercapnia (CMS/[facility]) 2019   • Anemia    • Anxiety    • Asthma    • Bilateral leg pain 10/4/2017   • Chest wall abscess 2019   • Chronic diastolic heart failure (CMS/[facility])    • Chronic kidney disease    • Diabetes mellitus (CMS/[facility])    • Diabetes mellitus, type II (CMS/[facility]) 8/3/2018   • Elevated troponin 2019   • Encephalopathy, metabolic 2019   • Generalized weakness 2019   • History of Clostridium difficile infection 8/3/2018   • History of respiratory failure, since off home oxygen 8/3/2018   • History of UTI 8/3/2018   • Hyperkalemia 2019   • Hypertension    • Morbid obesity (CMS/HCC)    • Osteoarthritis    • Sleep apnea    • Slow transit constipation 2019   • Tinea capitis 8/3/2018   • URI (upper respiratory infection) 2019   • Urinary tract infection due to extended-spectrum beta lactamase (ESBL)-producing Klebsiella 2019   • Urinary tract infection without hematuria 2019   • UTI (urinary tract infection), bacterial 10/4/2017   • Volume overload 2019     Past Surgical History:   Procedure Laterality Date   • ARM LESION/CYST EXCISION N/A 2019    Procedure: ABSCESS DRAINAGE X2 ON BACK;  Surgeon: Carlos Enrique Calderón MD;  Location:  [facility];  Service: General   • ARTERIOVENOUS FISTULA/SHUNT SURGERY Left 10/22/2018    Procedure: LEFT UPPER EXTREMITY ARTERIOVENOUS FISTULA CREATION;  Surgeon: Carlos Enrique Calderón MD;  Location:  [facility];  Service: Vascular   •  SECTION     • COLONOSCOPY N/A 2017    Procedure: COLONOSCOPY;  Surgeon: Mark I Brunner, MD;  Location:  [facility];  Service:    • ENDOSCOPY N/A 2017    Procedure: ESOPHAGOGASTRODUODENOSCOPY ;  Surgeon: Velasquez Call MD;  Location:  [facility];  Service:    • HERNIA REPAIR     • INSERTION HEMODIALYSIS CATHETER Right 10/17/2018    Procedure: HEMODIALYSIS CATHETER INSERTION;  Surgeon: Stephane  Carlos Enrique Lee MD;  Location: [facility] OR;  Service: General          OT ASSESSMENT FLOWSHEET (last 12 hours)      Occupational Therapy Evaluation     Row Name 10/01/19 0820                   OT Evaluation Time/Intention    Subjective Information  no complaints  -AN        Document Type  discharge evaluation/summary  -AN        Mode of Treatment  occupational therapy  -AN        Patient Effort  adequate  -AN           General Information    Patient Profile Reviewed?  yes  -AN        Onset of Illness/Injury or Date of Surgery  09/29/19  -AN        Patient/Family Observations  no family present  -AN        General Observations of Patient  pt supine in specialty bed, sleeping  -AN        Prior Level of Function  independent:;feeding;grooming;max assist:;dressing;bathing;dependent:;transfer  -AN        Pertinent History of Current Functional Problem  Pt to ED from ECF with dizziness and rectal pain  -AN        Existing Precautions/Restrictions  fall  -AN        Risks Reviewed  patient:;LOB;dizziness  -AN        Benefits Reviewed  patient:;improve function  -AN           Relationship/Environment    Primary Source of Support/Comfort  child(balaji)  -AN        Lives With  facility resident  -AN        Primary Roles/Responsibilities  disabled  -AN           Resource/Environmental Concerns    Current Living Arrangements  extended care facility  -AN           Cognitive Assessment/Interventions    Additional Documentation  Cognitive Assessment/Intervention (Group)  -AN           Cognitive Assessment/Intervention- PT/OT    Affect/Mental Status (Cognitive)  WFL  -AN        Orientation Status (Cognition)  oriented x 3  -AN        Personal Safety Interventions  fall prevention program maintained  -AN           Bed Mobility Assessment/Treatment    Comment (Bed Mobility)  declined  -AN           Functional Mobility    Functional Mobility- Ind. Level  not appropriate to assess  -AN           Transfer Assessment/Treatment    Comment  (Transfers)  pt declined, pt is [facility] lift for txfrs  -AN           ADL Assessment/Intervention    BADL Assessment/Intervention  grooming;feeding;toileting  -AN           Grooming Assessment/Training    Comment (Grooming)  simulated set up for wash face, hands   -AN           Self-Feeding Assessment/Training    [place] Level (Feeding)  prepare tray/open items;set up;feeding skills;conditional independence  -AN        Position (Self-Feeding)  sitting up in bed  -AN           Toileting Assessment/Training    Comment (Toileting)  pt reports she uses briefs daily, staff at [facility] change throughout the day  -AN           General ROM    GENERAL ROM COMMENTS  Kevin UE WFL  -AN           MMT (Manual Muscle Testing)    General MMT Comments  Kevin UE WFL grossly 4-/5  -AN           Motor Assessment/Interventions    Additional Documentation  Gross Motor Coordination (Group)  -AN           Gross Motor Coordination    Gross Motor Skill, Impairments Detail  WFl functional GM/FM  -AN           Sensory Assessment/Intervention    Sensory General Assessment  no sensation deficits identified  -AN        Additional Documentation  Vision Assessment/Intervention (Group)  -AN           Vision Assessment/Intervention    Visual Impairment/Limitations  WFL  -AN           Positioning and Restraints    Pre-Treatment Position  in bed  -AN        Post Treatment Position  bed  -AN        In Bed  fowlers;call light within reach;encouraged to call for assist  -AN           Pain Scale: Numbers Pre/Post-Treatment    Pain Scale: Numbers, Pretreatment  0/10 - no pain  -AN        Pain Scale: Numbers, Post-Treatment  0/10 - no pain  -AN           Wound 09/29/19 2130 medial coccyx Pressure Injury    Wound - Properties Group Date first assessed: 09/29/19  -LD Time first assessed: 2130  -LD Present on Hospital Admission: Y  -LD Orientation: medial  -LD Location: coccyx  -LD Primary Wound Type: Pressure inj  -LD Stage, Pressure Injury: Stage 2  -LD       Wound  09/29/19 2130 Right upper;posterior shoulder Other (comment)    Wound - Properties Group Date first assessed: 09/29/19  -LD Time first assessed: 2130 -LD Present on Hospital Admission: Y  -LD Side: Right  -LD Orientation: upper;posterior  -LD Location: shoulder  -LD Primary Wound Type: Other  -LD Additional Comments: BOIL  -LD       Wound 09/29/19 2130 Left medial;posterior back Other (comment)    Wound - Properties Group Date first assessed: 09/29/19  -LD Time first assessed: 2130  -LD Present on Hospital Admission: Y  -LD Side: Left  -LD Orientation: medial;posterior  -LD Location: back  -LD Primary Wound Type: Other  -LD Additional Comments: BOIL  -LD       Plan of Care Review    Plan of Care Reviewed With  patient  -AN           Clinical Impression (OT)    Date of Referral to OT  09/29/19  -AN        OT Diagnosis  No functional changes  -AN        Criteria for Skilled Therapeutic Interventions Met (OT Eval)  no problems identified which require skilled intervention  -AN        Therapy Frequency (OT Eval)  evaluation only  -AN        Care Plan Review (OT)  evaluation/treatment results reviewed  -AN        Anticipated Discharge Disposition (OT)  extended care facility  -AN           Vital Signs    Pre Systolic BP Rehab  140  -AN        Pre Treatment Diastolic BP  85  -AN           Living Environment    Home Accessibility  wheelchair accessible  -AN          User Key  (r) = Recorded By, (t) = Taken By, (c) = Cosigned By    Initials Name Effective Dates    AN Sasha Dover OT 06/22/15 -     LD Helen Ramsey RN 07/24/19 -               OT Recommendation and Plan  Outcome Summary/Treatment Plan (OT)  Anticipated Discharge Disposition (OT): extended care facility  Therapy Frequency (OT Eval): evaluation only  Plan of Care Review  Plan of Care Reviewed With: patient  Plan of Care Reviewed With: patient  Outcome Summary: Pt currently at functional baseline. Pt set up for feeding and grooming. Pt use of lift at ECF and  max assist for bathing and dressing. No further acute OT warranted at this time.          Outcome Measures     Row Name 10/01/19 0820             How much help from another is currently needed...    Putting on and taking off regular lower body clothing?  1  -AN      Bathing (including washing, rinsing, and drying)  1  -AN      Toileting (which includes using toilet bed pan or urinal)  1  -AN      Putting on and taking off regular upper body clothing  2  -AN      Taking care of personal grooming (such as brushing teeth)  3  -AN      Eating meals  3  -AN      AM-PAC 6 Clicks Score (OT)  11  -AN         Functional Assessment    Outcome [employer]  AM-PAC 6 Clicks Daily Activity (OT)  -AN        User Key  (r) = Recorded By, (t) = Taken By, (c) = Cosigned By    Initials Name Provider Type    Sasha Fry OT Occupational Therapist          Time Calculation:   Time Calculation- OT     Row Name 10/01/19 1013             Time Calculation- OT    OT Start Time  0820  -AN      Total Timed Code Minutes- OT  0 minute(s)  -AN      OT Received On  10/01/19  -AN        User Key  (r) = Recorded By, (t) = Taken By, (c) = Cosigned By    Initials Name Provider Type    Sasha Fry OT Occupational Therapist        Therapy Suggested Charges     Code   Minutes Charges    None           Therapy Charges for Today     Code Description Service Date Service Provider Modifiers Qty    71350752426  OT EVAL LOW COMPLEXITY 3 10/1/2019 Sasha Dover OT GO 1               OT Discharge Summary  Anticipated Discharge Disposition (OT): extended care facility  Reason for Discharge: At baseline function  Outcomes Achieved: Refer to plan of care for updates on goals achieved  Discharge Destination: Extended care facility - LTC    Sasha Dover OT  10/1/2019    Electronically signed by Sasha Dover OT at 10/01/19 1014     Sasha Dover OT at 10/01/19 1012          Problem: Patient Care Overview  Goal: Plan of Care Review  Outcome:  Outcome(s) achieved Date Met: 10/01/19   10/01/19 0820   Coping/Psychosocial   Plan of Care Reviewed With patient   OTHER   Outcome Summary Pt currently at functional baseline. Pt set up for feeding and grooming. Pt use of lift at ECF and max assist for bathing and dressing. No further acute OT warranted at this time.            Electronically signed by Sasha Dover OT at 10/01/19 1012       Speech Language Pathology Notes (last 24 hours) (Notes from 09/30/19 1115 through 10/01/19 1115)     No notes of this type exist for this encounter.

## 2019-10-01 NOTE — PLAN OF CARE
Problem: Patient Care Overview  Goal: Plan of Care Review  Outcome: Outcome(s) achieved Date Met: 10/01/19   10/01/19 0890   Coping/Psychosocial   Plan of Care Reviewed With patient   OTHER   Outcome Summary Pt currently at functional baseline. Pt set up for feeding and grooming. Pt use of lift at ECF and max assist for bathing and dressing. No further acute OT warranted at this time.

## 2019-10-01 NOTE — THERAPY DISCHARGE NOTE
Acute Care - Occupational Therapy Initial Eval/Discharge  [facility]     Patient Name: Joy Bueno  : 1950  MRN: [id number]  Today's Date: 10/1/2019  Onset of Illness/Injury or Date of Surgery: 19  Date of Referral to OT: 19         Admit Date: 2019       ICD-10-CM ICD-9-CM   1. Cerebellar infarct (CMS/[facility]) I63.9 434.91   2. Vertigo R42 780.4   3. Stage II skin ulcer, limited to breakdown of skin (CMS/[facility]) L98.491 707.9   4. Poisoning by warfarin sodium, accidental or unintentional, initial encounter T45.511A 964.2     E858.2   5. Cognitive communication deficit R41.841 799.52   6. Impaired mobility and ADLs Z74.09 799.89     Patient Active Problem List   Diagnosis   • Essential hypertension   • Morbid obesity (CMS/[facility])   • Anxiety   • Type 2 diabetes mellitus with chronic kidney disease on chronic dialysis, with long-term current use of insulin (CMS/[facility])   • History of UTI   • ESRD (end stage renal disease) (CMS/[facility])   • Chronic diastolic heart failure (CMS/[facility])   • Abscess of back s/p drainage as outpatient    • History of deep vein thrombosis (DVT) of brachial vein of left upper extremity (CMS/[facility])   • Anticoagulated on Coumadin   • Supratherapeutic INR   • Cerebellar infarct (CMS/[facility])   • Hemodialysis patient (CMS/[facility])   • Excoriation of buttock   • Dizziness     Past Medical History:   Diagnosis Date   • Acute on chronic diastolic heart failure (CMS/[facility])    • Acute on chronic heart failure (CMS/[facility])    • Acute respiratory failure with hypercapnia (CMS/[facility]) 2019   • Anemia    • Anxiety    • Asthma    • Bilateral leg pain 10/4/2017   • Chest wall abscess 2019   • Chronic diastolic heart failure (CMS/[facility])    • Chronic kidney disease    • Diabetes mellitus (CMS/[facility])    • Diabetes mellitus, type II (CMS/[facility]) 8/3/2018   • Elevated troponin 2019   • Encephalopathy, metabolic 2019   • Generalized weakness 2019   • History of Clostridium difficile infection  8/3/2018   • History of respiratory failure, since off home oxygen 8/3/2018   • History of UTI 8/3/2018   • Hyperkalemia 2019   • Hypertension    • Morbid obesity (CMS/[facility])    • Osteoarthritis    • Sleep apnea    • Slow transit constipation 2019   • Tinea capitis 8/3/2018   • URI (upper respiratory infection) 2019   • Urinary tract infection due to extended-spectrum beta lactamase (ESBL)-producing Klebsiella 2019   • Urinary tract infection without hematuria 2019   • UTI (urinary tract infection), bacterial 10/4/2017   • Volume overload 2019     Past Surgical History:   Procedure Laterality Date   • ARM LESION/CYST EXCISION N/A 2019    Procedure: ABSCESS DRAINAGE X2 ON BACK;  Surgeon: Carlos Enrique Calderón MD;  Location:  [facility];  Service: General   • ARTERIOVENOUS FISTULA/SHUNT SURGERY Left 10/22/2018    Procedure: LEFT UPPER EXTREMITY ARTERIOVENOUS FISTULA CREATION;  Surgeon: Carlos Enrique Calderón MD;  Location:  [facility];  Service: Vascular   •  SECTION     • COLONOSCOPY N/A 2017    Procedure: COLONOSCOPY;  Surgeon: Mark I Brunner, MD;  Location:  [facility];  Service:    • ENDOSCOPY N/A 2017    Procedure: ESOPHAGOGASTRODUODENOSCOPY ;  Surgeon: Velasquez Call MD;  Location:  [facility];  Service:    • HERNIA REPAIR     • INSERTION HEMODIALYSIS CATHETER Right 10/17/2018    Procedure: HEMODIALYSIS CATHETER INSERTION;  Surgeon: Carlos Enrique Calderón MD;  Location:  [facility];  Service: General          OT ASSESSMENT FLOWSHEET (last 12 hours)      Occupational Therapy Evaluation     Row Name 10/01/19 0820                   OT Evaluation Time/Intention    Subjective Information  no complaints  -AN        Document Type  discharge evaluation/summary  -AN        Mode of Treatment  occupational therapy  -AN        Patient Effort  adequate  -AN           General Information    Patient Profile Reviewed?  yes  -AN        Onset of Illness/Injury or Date of  Surgery  09/29/19  -AN        Patient/Family Observations  no family present  -AN        General Observations of Patient  pt supine in specialty bed, sleeping  -AN        Prior Level of Function  independent:;feeding;grooming;max assist:;dressing;bathing;dependent:;transfer  -AN        Pertinent History of Current Functional Problem  Pt to ED from F with dizziness and rectal pain  -AN        Existing Precautions/Restrictions  fall  -AN        Risks Reviewed  patient:;LOB;dizziness  -AN        Benefits Reviewed  patient:;improve function  -AN           Relationship/Environment    Primary Source of Support/Comfort  child(balaji)  -AN        Lives With  facility resident  -AN        Primary Roles/Responsibilities  disabled  -AN           Resource/Environmental Concerns    Current Living Arrangements  extended care facility  -AN           Cognitive Assessment/Interventions    Additional Documentation  Cognitive Assessment/Intervention (Group)  -AN           Cognitive Assessment/Intervention- PT/OT    Affect/Mental Status (Cognitive)  WFL  -AN        Orientation Status (Cognition)  oriented x 3  -AN        Personal Safety Interventions  fall prevention program maintained  -AN           Bed Mobility Assessment/Treatment    Comment (Bed Mobility)  declined  -AN           Functional Mobility    Functional Mobility- Ind. Level  not appropriate to assess  -AN           Transfer Assessment/Treatment    Comment (Transfers)  pt declined, pt is [facility] lift for txfrs  -AN           ADL Assessment/Intervention    BADL Assessment/Intervention  grooming;feeding;toileting  -AN           Grooming Assessment/Training    Comment (Grooming)  simulated set up for wash face, hands   -AN           Self-Feeding Assessment/Training    [place] Level (Feeding)  prepare tray/open items;set up;feeding skills;conditional independence  -AN        Position (Self-Feeding)  sitting up in bed  -AN           Toileting Assessment/Training    Comment  (Toileting)  pt reports she uses briefs daily, staff at [facility] change throughout the day  -AN           General ROM    GENERAL ROM COMMENTS  Kevin UE WFL  -AN           MMT (Manual Muscle Testing)    General MMT Comments  Kevin UE WFL grossly 4-/5  -AN           Motor Assessment/Interventions    Additional Documentation  Gross Motor Coordination (Group)  -AN           Gross Motor Coordination    Gross Motor Skill, Impairments Detail  WFl functional GM/FM  -AN           Sensory Assessment/Intervention    Sensory General Assessment  no sensation deficits identified  -AN        Additional Documentation  Vision Assessment/Intervention (Group)  -AN           Vision Assessment/Intervention    Visual Impairment/Limitations  WFL  -AN           Positioning and Restraints    Pre-Treatment Position  in bed  -AN        Post Treatment Position  bed  -AN        In Bed  fowlers;call light within reach;encouraged to call for assist  -AN           Pain Scale: Numbers Pre/Post-Treatment    Pain Scale: Numbers, Pretreatment  0/10 - no pain  -AN        Pain Scale: Numbers, Post-Treatment  0/10 - no pain  -AN           Wound 09/29/19 2130 medial coccyx Pressure Injury    Wound - Properties Group Date first assessed: 09/29/19 -LD Time first assessed: 2130 -LD Present on Hospital Admission: Y  -LD Orientation: medial  -LD Location: coccyx  -LD Primary Wound Type: Pressure inj  -LD Stage, Pressure Injury: Stage 2  -LD       Wound 09/29/19 2130 Right upper;posterior shoulder Other (comment)    Wound - Properties Group Date first assessed: 09/29/19 -LD Time first assessed: 2130 -LD Present on Hospital Admission: Y  -LD Side: Right  -LD Orientation: upper;posterior  -LD Location: shoulder  -LD Primary Wound Type: Other  -LD Additional Comments: BOIL  -LD       Wound 09/29/19 2130 Left medial;posterior back Other (comment)    Wound - Properties Group Date first assessed: 09/29/19 -LD Time first assessed: 2130  -LD Present on Hospital Admission:  Y  -LD Side: Left  -LD Orientation: medial;posterior  -LD Location: back  -LD Primary Wound Type: Other  -LD Additional Comments: BOIL  -LD       Plan of Care Review    Plan of Care Reviewed With  patient  -AN           Clinical Impression (OT)    Date of Referral to OT  09/29/19  -AN        OT Diagnosis  No functional changes  -AN        Criteria for Skilled Therapeutic Interventions Met (OT Eval)  no problems identified which require skilled intervention  -AN        Therapy Frequency (OT Eval)  evaluation only  -AN        Care Plan Review (OT)  evaluation/treatment results reviewed  -AN        Anticipated Discharge Disposition (OT)  extended care facility  -AN           Vital Signs    Pre Systolic BP Rehab  140  -AN        Pre Treatment Diastolic BP  85  -AN           Living Environment    Home Accessibility  wheelchair accessible  -AN          User Key  (r) = Recorded By, (t) = Taken By, (c) = Cosigned By    Initials Name Effective Dates    AN Sasha Dover, OT 06/22/15 -     LD Helen Ramsey RN 07/24/19 -               OT Recommendation and Plan  Outcome Summary/Treatment Plan (OT)  Anticipated Discharge Disposition (OT): extended care facility  Therapy Frequency (OT Eval): evaluation only  Plan of Care Review  Plan of Care Reviewed With: patient  Plan of Care Reviewed With: patient  Outcome Summary: Pt currently at functional baseline. Pt set up for feeding and grooming. Pt use of lift at ECF and max assist for bathing and dressing. No further acute OT warranted at this time.          Outcome Measures     Row Name 10/01/19 0820             How much help from another is currently needed...    Putting on and taking off regular lower body clothing?  1  -AN      Bathing (including washing, rinsing, and drying)  1  -AN      Toileting (which includes using toilet bed pan or urinal)  1  -AN      Putting on and taking off regular upper body clothing  2  -AN      Taking care of personal grooming (such as brushing  teeth)  3  -AN      Eating meals  3  -AN      AM-PAC 6 Clicks Score (OT)  11  -AN         Functional Assessment    Outcome Measure Options  AM-PAC 6 Clicks Daily Activity (OT)  -AN        User Key  (r) = Recorded By, (t) = Taken By, (c) = Cosigned By    Initials Name Provider Type    Sasha Fry OT Occupational Therapist          Time Calculation:   Time Calculation- OT     Row Name 10/01/19 1013             Time Calculation- OT    OT Start Time  0820  -AN      Total Timed Code Minutes- OT  0 minute(s)  -AN      OT Received On  10/01/19  -AN        User Key  (r) = Recorded By, (t) = Taken By, (c) = Cosigned By    Initials Name Provider Type    Sasha Fry OT Occupational Therapist        Therapy Suggested Charges     Code   Minutes Charges    None           Therapy Charges for Today     Code Description Service Date Service Provider Modifiers Qty    [id number]  OT EVAL LOW COMPLEXITY 3 10/1/2019 Sasha Dover OT GO 1               OT Discharge Summary  Anticipated Discharge Disposition (OT): extended care facility  Reason for Discharge: At baseline function  Outcomes Achieved: Refer to plan of care for updates on goals achieved  Discharge Destination: Extended care facility - LTC    Sasha Dover OT  10/1/2019

## 2019-10-01 NOTE — PLAN OF CARE
Problem: Patient Care Overview  Goal: Plan of Care Review  Outcome: Ongoing (interventions implemented as appropriate)   10/01/19 0436   Coping/Psychosocial   Plan of Care Reviewed With patient   Plan of Care Review   Progress no change   OTHER   Outcome Summary Pt a&o x4. VSS. NIH: 0 Incontinent of bladder. Purewick in place. Pt given bath this evening, zguard applied. Refused scuds and heel boots tonight. No other complaints at this time. Will continue to monitorl        Problem: Fall Risk (Adult)  Goal: Identify Related Risk Factors and Signs and Symptoms  Outcome: Ongoing (interventions implemented as appropriate)   10/01/19 0436   Fall Risk (Adult)   Related Risk Factors (Fall Risk) bladder function altered;fatigue/slow reaction;gait/mobility problems;neuro disease/injury;sensory deficits;sleep pattern alteration;environment unfamiliar   Signs and Symptoms (Fall Risk) presence of risk factors     Goal: Absence of Fall  Outcome: Ongoing (interventions implemented as appropriate)   10/01/19 0436   Fall Risk (Adult)   Absence of Fall achieves outcome       Problem: Skin Injury Risk (Adult)  Goal: Identify Related Risk Factors and Signs and Symptoms  Outcome: Ongoing (interventions implemented as appropriate)   10/01/19 0436   Skin Injury Risk (Adult)   Related Risk Factors (Skin Injury Risk) body weight extremes;fluid intake inadequate;mobility impaired;moisture     Goal: Skin Health and Integrity  Outcome: Ongoing (interventions implemented as appropriate)   10/01/19 0436   Skin Injury Risk (Adult)   Skin Health and Integrity making progress toward outcome       Problem: Hemodialysis (Adult)  Goal: Signs and Symptoms of Listed Potential Problems Will be Absent, Minimized or Managed (Hemodialysis)  Outcome: Ongoing (interventions implemented as appropriate)   10/01/19 0436   Goal/Outcome Evaluation   Problems Assessed (Hemodialysis) all   Problems Present (Hemodialysis) situational response;fluid imbalance

## 2019-10-01 NOTE — PLAN OF CARE
Follow-up for assessment of patients coccyx for a possible wound POA. Atempeted to see yesterday but was unable due to dialysis. Assessment performed. All areas are intact and blanching. Very mild MASD seen on perirectum. Barrier cream in place. Just continue with good general skin care and the use of barrier cream for moisture protection and management. Thanks

## 2019-10-01 NOTE — DISCHARGE PLACEMENT REQUEST
"Pam Loyd (69 y.o. Female)   Song Marquez, RN Case Manager'  [phone]    Date of Birth Social Security Number Address Home Phone MRN    1950  [address] [id number] [phone] [id number]    Tenriism Marital Status          None        Admission Date Admission Type Admitting Provider Attending Provider Department, Room/Bed    9/29/19 Emergency Tri Gary MD Anciro, Audree, MD [facility] 3F, S313/1    Discharge Date Discharge Disposition Discharge Destination         Skilled Nursing Facility (DC - External)              Attending Provider:  Tri Gary MD    Allergies:  Geodon [Ziprasidone Hcl], Haldol [Haloperidol Lactate], Seroquel [Quetiapine Fumarate]    Isolation:  None   Infection:  None   Code Status:  No CPR    Ht:  162.6 cm (64.02\")   Wt:  130 kg (286 lb 9.6 oz)    Admission Cmt:  None   Principal Problem:  Cerebellar infarct (CMS/HCC) [I63.9]                 Active Insurance as of 9/29/2019     Primary Coverage     Payor Plan Insurance Group Employer/Plan Group    MEDICARE MEDICARE A & B      Payor Plan Address Payor Plan Phone Number Payor Plan Fax Number Effective Dates    [address] [phone]  5/1/2016 - None Entered    [facility] [address]       Subscriber Name Subscriber Birth Date Member ID       PAM LOYD 1950 [id number]           Secondary Coverage     Payor Plan Insurance Group Employer/Plan Group    [place]      Payor Plan Address Payor Plan Phone Number Payor Plan Fax Number Effective Dates    [address] [phone]  8/2/2018 - None Entered    [place] [address]       Subscriber Name Subscriber Birth Date Member ID       PAM LOYD 1950 [id number]                 Emergency Contacts      (Rel.) Home Phone Work Phone Mobile Phone    Tessie Dorado (Daughter) [phone] -- [phone]                 Discharge Summary      Tri Gary MD at " 10/01/19 [facility]              [facility] Medicine Services  DISCHARGE SUMMARY    Patient Name: Joy Bueno  : 1950  MRN: [id number]    Date of Admission: 2019  Date of Discharge:  10/1/2019  Primary Care Physician: Goldy Dior MD    Consults     Date and Time Order Name Status Description    2019 1621 Inpatient Infectious Diseases Consult Completed     2019 0030 Inpatient Nephrology Consult Completed     2019 0030 Inpatient Neurology Consult Stroke Completed     2019 1518 Inpatient Nephrology Consult Completed           Hospital Course     Presenting Problem:   Cerebellar infarct (CMS/[facility]) [I63.9]    Active Hospital Problems    Diagnosis  POA   • **Cerebellar infarct (CMS/[facility]) [I63.9]  Yes   • Hemodialysis patient (CMS/HCC) [Z99.2]  Not Applicable   • Excoriation of buttock [S30.810A]  Yes   • Dizziness [R42]  Yes   • Supratherapeutic INR [R79.1]  Yes   • Abscess of back s/p drainage as outpatient  [L02.212]  Yes   • Anticoagulated on Coumadin [Z79.01]  Not Applicable   • History of deep vein thrombosis (DVT) of brachial vein of left upper extremity (CMS/HCC) [I82.622]  Yes   • Chronic diastolic heart failure (CMS/HCC) [I50.32]  Yes   • ESRD (end stage renal disease) (CMS/HCC) [N18.6]  Yes   • Type 2 diabetes mellitus with chronic kidney disease on chronic dialysis, with long-term current use of insulin (CMS/HCC) [E11.22, N18.6, Z99.2, Z79.4]  Not Applicable   • Essential hypertension [I10]  Yes   • Morbid obesity (CMS/HCC) [E66.01]  Yes   • Anxiety [F41.9]  Yes      Resolved Hospital Problems   No resolved problems to display.          Hospital Course:  Joy Bueno is a 69 y.o. female with a history of hypertension, CAD, type 2 diabetes, end-stage renal disease on HD , history of DVT on Coumadin, and history of cerebellar CVA who presented to [facility] for worsening dizziness and lightheadedness and concern for CVA. She had an  MRI brain that showed a chronic infarct of right inferior cerebellum, chronic ischemic changes around ventricles, but no acute findings of infarct. Neurology was consulted and believed her current dizziness and lightheadedness were secondary to post-stroke recrudescence. She will continue aspirin 81 mg daily and atorvastatin 80 mg daily. Meclizine as needed for dizziness. She had a supratherapeutic INR of 4.3 and her warfarin was held, she had no signs of active bleeding. She will need to hold warfarin for 2-3 more days until INR within therapeutic range for DVT. She has chronic wounds on her back, ID evaluated, no antibiotics needed. She was discharged on 10/1/2019 to return to [facility].    Discharge Follow Up Recommendations for labs/diagnostics:   Primary care doctor/doctor at [facility] in [place], Recommend PT/INR , Re: warfarin dosing for DVT treatment    Day of Discharge     HPI:   No acute events overnight. Continues to feel dizzy intermittently, denies nausea or vomiting.     Review of Systems  Gen- No fevers, chills  CV- No chest pain, palpitations  Resp- No cough, dyspnea  GI- No N/V/D, abd pain    All other systems reviewed and are negative.    Vital Signs:   Temp:  [97.5 °F (36.4 °C)-98.4 °F (36.9 °C)] 97.5 °F (36.4 °C)  Heart Rate:  [69-78] 69  Resp:  [18-20] 18  BP: (138-187)/(68-95) 172/92     Physical Exam:  Constitutional: No acute distress, awake, alert  HENT: NCAT, mucous membranes moist  Respiratory: Clear to auscultation bilaterally, respiratory effort normal   Cardiovascular: RRR, no murmurs, rubs, or gallops, palpable pedal pulses bilaterally, no lower extremity edema  Gastrointestinal: Positive bowel sounds, soft, nontender, nondistended  Psychiatric: Appropriate affect, cooperative  Neurologic: Oriented x 3, Cranial Nerves grossly intact to confrontation, speech clear  Skin: on mid left back there is an ulcer ~1x2 cm in size, without active drainage or bleeding, muscle is seen,  meplex used to cover. On right upper back there is a 1x1 cm healing wound, without active bleeding or drainage.     Pertinent  and/or Most Recent Results     Results from last 7 days   Lab Units 10/01/19  0514 09/30/19  0538 09/29/19  1647   WBC 10*3/mm3 8.28 7.63 6.25   HEMOGLOBIN g/dL 11.0* 11.0* 10.8*   HEMATOCRIT % 38.3 38.0 37.3   PLATELETS 10*3/mm3 238 261 247   SODIUM mmol/L 138 139 137   POTASSIUM mmol/L 4.5 4.5 4.8   CHLORIDE mmol/L 103 102 101   CO2 mmol/L 22.0 22.0 22.0   BUN mg/dL 43* 64* 62*   CREATININE mg/dL 3.74* 4.91* 4.33*   GLUCOSE mg/dL 277* 234* 259*   CALCIUM mg/dL 8.5* 8.9 8.5*     Results from last 7 days   Lab Units 10/01/19  0514 09/30/19  0538 09/29/19  1647   BILIRUBIN mg/dL 0.4  --  0.4   ALK PHOS U/L 121*  --  106   ALT (SGPT) U/L 6  --  7   AST (SGOT) U/L 9  --  10   PROTIME Seconds 32.9* 39.9* 39.7*   INR  3.38* 4.33* 4.31*   APTT seconds  --  58.6*  --      Results from last 7 days   Lab Units 09/30/19  0538   CHOLESTEROL mg/dL 118   TRIGLYCERIDES mg/dL 246*   HDL CHOL mg/dL 31*     Results from last 7 days   Lab Units 09/30/19  0538   TSH uIU/mL 4.250*   HEMOGLOBIN A1C % 8.80*       Brief Urine Lab Results  (Last result in the past 365 days)      Color   Clarity   Blood   Leuk Est   Nitrite   Protein   CREAT   Urine HCG        09/01/19 1214 Yellow Turbid Moderate (2+) Large (3+) Negative >=300 mg/dL (3+)               Microbiology Results Abnormal     None          Imaging Results (all)     Procedure Component Value Units Date/Time    CT Head Without Contrast [[id number]] Collected:  09/29/19 1810     Updated:  09/30/19 0827    Narrative:       EXAMINATION: CT HEAD WO CONTRAST - 09/29/2019     INDICATION: Vertigo, nausea and vomiting.      TECHNIQUE: 5 mm unenhanced images through the brain.     The radiation dose reduction device was turned on for each scan per the  ALARA (As Low as Reasonably Achievable) protocol.     COMPARISON: 04/27/2019 head CT scan.     FINDINGS: History  indicates vertigo x 2 days, nausea and vomiting. The  calvarium appears intact. Included paranasal sinuses and mastoids appear  clear except for an approximately 2.5 cm left maxillary sinus mucous  cyst. There is focal dense edema in the inferior right cerebellum and  much milder focal edema in the inferior left cerebellum, consistent with  recent or subacute infarcts. There is probably involvement of the  inferior cerebellar vermis as well. There appear to be a couple of  punctate old lacunar infarcts in the right basal ganglia. There is no  evidence of edema/infarct elsewhere, no evidence of hemorrhage,  hydrocephalus, mass or mass effect or abnormal extra-axial collection.        Impression:       1. Inferior cerebellar edema, right greater than left, most likely  representing subacute infarcts. No evidence of hemorrhage.  2. Old lacunar infarct in the right basal ganglia.  3. No evidence of acute intracranial disease elsewhere.     DICTATED:   09/29/2019  EDITED/ls :   09/29/2019      This report was finalized on 9/30/2019 8:24 AM by DR. Valentino Lo MD.       MRI Brain Without Contrast [[id number]] Collected:  09/29/19 2144     Updated:  09/29/19 2146    Narrative:       MRI Brain WO    INDICATION:   Dizziness for the past 2 days with nausea. Evaluate for stroke.    TECHNIQUE:   MRI of the brain without IV contrast.    COMPARISON:    None available.    FINDINGS:  Diffusion-weighted images show no evidence of recent infarct. The routine brain images demonstrate encephalomalacia in the right posterior inferior cerebellar artery distribution consistent with an old infarct. There is no evidence of mass lesion  hemorrhage or edema. Generalized atrophy is seen and there are moderately severe chronic ischemic changes around the ventricles bilaterally. Extra-axial structures are unremarkable.      Impression:       Chronic infarct right inferior cerebellum. Chronic ischemic changes around the ventricles. No acute  findings.    Signer Name: Ricardo Fowler MD   Signed: 9/29/2019 9:44 PM   Workstation Name: [id number]    [facility]          Results for orders placed during the hospital encounter of 09/29/19   Duplex Carotid Ultrasound CAR    Narrative · Right internal carotid artery stenosis of 0-49%.  · Left internal carotid artery stenosis of 0-49%.          Results for orders placed during the hospital encounter of 09/29/19   Duplex Carotid Ultrasound CAR    Narrative · Right internal carotid artery stenosis of 0-49%.  · Left internal carotid artery stenosis of 0-49%.          Results for orders placed during the hospital encounter of 09/29/19   Adult Transthoracic Echo Complete W/ Cont if Necessary Per Protocol    Narrative · Estimated EF = 60%.  · Left ventricular systolic function is normal.            Discharge Details        Discharge Medications      New Medications      Instructions Start Date   aspirin 81 MG chewable tablet   81 mg, Oral, Daily   Start Date:  10/2/2019     meclizine 12.5 MG tablet  Commonly known as:  ANTIVERT   12.5 mg, Oral, 3 Times Daily PRN         Changes to Medications      Instructions Start Date   atorvastatin 80 MG tablet  Commonly known as:  LIPITOR  What changed:    · medication strength  · how much to take   80 mg, Oral, Nightly      warfarin 2.5 MG tablet  Commonly known as:  COUMADIN  What changed:  These instructions start on 10/3/2019. If you are unsure what to do until then, ask your doctor or other care provider.   2.5 mg, Oral, Daily Warfarin, Take with 4mg for a total of 6.5mg    Start Date:  10/3/2019     warfarin 4 MG tablet  Commonly known as:  COUMADIN  What changed:  These instructions start on 10/3/2019. If you are unsure what to do until then, ask your doctor or other care provider.   4 mg, Oral, Daily Warfarin, Take with 2.5mg for a total of 6.5mg    Start Date:  10/3/2019        Continue These Medications      Instructions Start Date    acetaminophen 325 MG tablet  Commonly known as:  TYLENOL   650 mg, Oral, Every 6 Hours PRN      albuterol sulfate  (90 Base) MCG/ACT inhaler  Commonly known as:  PROVENTIL HFA;VENTOLIN HFA;PROAIR HFA   2 puffs, Inhalation, Every 4 Hours PRN      calcitriol 0.25 MCG capsule  Commonly known as:  ROCALTROL   0.25 mcg, Oral, Daily      carvedilol 25 MG tablet  Commonly known as:  COREG   25 mg, Oral, 2 Times Daily With Meals      diltiaZEM (CARDIZEM) 2% cream   Topical, 3 Times Daily PRN      docusate sodium 100 MG capsule  Commonly known as:  COLACE   100 mg, Oral, 2 Times Daily      fluticasone-salmeterol 500-50 MCG/DOSE DISKUS  Commonly known as:  ADVAIR   1 puff, Inhalation, 2 Times Daily - RT      HYDROcodone-acetaminophen 5-325 MG per tablet  Commonly known as:  NORCO   1 tablet, Oral, Every 8 Hours PRN      insulin aspart 100 UNIT/ML solution pen-injector sc pen  Commonly known as:  novoLOG FLEXPEN   5 Units, Subcutaneous, 3 Times Daily With Meals      ipratropium-albuterol 0.5-2.5 mg/3 ml nebulizer  Commonly known as:  DUO-NEB   3 mL, Nebulization, Every 6 Hours PRN      isosorbide mononitrate 30 MG 24 hr tablet  Commonly known as:  IMDUR   30 mg, Oral, Daily      Lidocaine 2 % gel   Apply externally, 3 Times Daily PRN      melatonin 3 MG tablet   3 mg, Oral, Nightly      ondansetron 4 MG tablet  Commonly known as:  ZOFRAN   4 mg, Oral, Every 8 Hours PRN      pantoprazole 40 MG EC tablet  Commonly known as:  PROTONIX   40 mg, Oral, Daily      polyethylene glycol pack packet  Commonly known as:  MIRALAX   17 g, Oral, 2 Times Daily      PREPARATION H 0.25-88.44 % suppository suppository  Generic drug:  phenylephrine-cocoa Butter   Rectal, As Needed      promethazine 25 MG tablet  Commonly known as:  PHENERGAN   25 mg, Oral, 3 Times Weekly, On dialysis days (Mon Weds Fri)       saccharomyces boulardii 250 MG capsule  Commonly known as:  FLORASTOR   250 mg, Oral, Daily      sertraline 50 MG tablet  Commonly  known as:  ZOLOFT   50 mg, Oral, Daily      sevelamer 800 MG tablet  Commonly known as:  RENVELA   800 mg, Oral, 3 Times Daily With Meals, 0800, 1200 & 1700      TAB-A-STAR tablet   1 tablet, Oral, Daily      vitamin D 51706 units capsule capsule  Commonly known as:  ERGOCALCIFEROL   50,000 Units, Oral, Weekly, TAKES ON WEDNESDAYS              Allergies   Allergen Reactions   • Geodon [Ziprasidone Hcl] Unknown (See Comments)     PT DOESN'T REMEMBER   • Haldol [Haloperidol Lactate] Unknown (See Comments)     PT DOESN'T REMEMBER   • Seroquel [Quetiapine Fumarate] Unknown (See Comments)     PT DOESN'T REMEMBER         Discharge Disposition:  Skilled Nursing Facility (DC - External)    Diet:  Hospital:  Diet Order   Procedures   • Diet Regular; Cardiac, Consistent Carbohydrate     Discharge:     Discharge Activity:         CODE STATUS:    Code Status and Medical Interventions:   Ordered at: 09/29/19 1958     Level Of Support Discussed With:    Patient     Code Status:    No CPR     Medical Interventions (Level of Support Prior to Arrest):    Full         Future Appointments   Date Time Provider Department Center   10/2/2019  7:00 AM DIALYSIS STATION 2  [facility]       Additional Instructions for the Follow-ups that You Need to Schedule     Discharge Follow-up with PCP   As directed       Currently Documented PCP:    Goldy Dior MD    PCP Phone Number:    [phone]     Follow Up Details:  supratherapeutic INR on discharge from the hospital, will need to restart warfarin when INR appropriate               Time Spent on Discharge:  40 minutes    Electronically signed by Tri Gary MD, 10/01/19, 11:18 AM.        Electronically signed by Tri Gary MD at 10/01/19 7437

## 2019-10-01 NOTE — THERAPY DISCHARGE NOTE
Acute Care - Speech Language Pathology /Discharge  [facility]     Patient Name: Joy Bueno  : 1950  MRN: [id number]  Today's Date: 10/1/2019         Admit Date: 2019    Visit Dx:     ICD-10-CM ICD-9-CM   1. Cerebellar infarct (CMS/[facility]) I63.9 434.91   2. Vertigo R42 780.4   3. Stage II skin ulcer, limited to breakdown of skin (CMS/[facility]) L98.491 707.9   4. Poisoning by warfarin sodium, accidental or unintentional, initial encounter T45.511A 964.2     E858.2   5. Cognitive communication deficit R41.841 799.52   6. Impaired mobility and ADLs Z74.09 799.89     Patient Active Problem List   Diagnosis   • Essential hypertension   • Morbid obesity (CMS/[facility])   • Anxiety   • Type 2 diabetes mellitus with chronic kidney disease on chronic dialysis, with long-term current use of insulin (CMS/[facility])   • History of UTI   • ESRD (end stage renal disease) (CMS/[facility])   • Chronic diastolic heart failure (CMS/[facility])   • Abscess of back s/p drainage as outpatient    • History of deep vein thrombosis (DVT) of brachial vein of left upper extremity (CMS/[facility])   • Anticoagulated on Coumadin   • Supratherapeutic INR   • Cerebellar infarct (CMS/[facility])   • Hemodialysis patient (CMS/[facility])   • Excoriation of buttock   • Dizziness        Therapy Treatment    Rehabilitation Treatment Summary     Row Name 10/01/19 1545             Treatment Time/Intention    Discipline  speech language pathologist  -      Document Type  discharge treatment  -SM      Subjective Information  no complaints  -SM      Care Plan Review  evaluation/treatment results reviewed;patient/other agree to care plan  -SM      Patient Effort  good  -SM      Recorded by [SM] Gloria Pollard, MS CCC-SLP 10/01/19 4821      Row Name 10/01/19 1542             Pain Scale: Numbers Pre/Post-Treatment    Pain Scale: Numbers, Pretreatment  0/10 - no pain  -SM      Pain Scale: Numbers, Post-Treatment  0/10 - no pain  -SM      Recorded by [SM] Gloria Pollard MS  [facility]-SLP 10/01/19 1605      Row Name                Wound 09/29/19 2130 medial coccyx Pressure Injury    Wound - Properties Group Date first assessed: 09/29/19 [LD] Time first assessed: 2130 [LD] Present on Hospital Admission: Y [LD] Orientation: medial [LD] Location: coccyx [LD] Primary Wound Type: Pressure inj [LD] Stage, Pressure Injury: Stage 2 [LD2] Recorded by:  [LD] Helen Ramsey RN 09/29/19 2328 [LD2] Helen Ramsey RN 09/30/19 0323    Row Name                Wound 09/29/19 2130 Right upper;posterior shoulder Other (comment)    Wound - Properties Group Date first assessed: 09/29/19 [LD] Time first assessed: 2130 [LD] Present on Hospital Admission: Y [LD] Side: Right [LD] Orientation: upper;posterior [LD] Location: shoulder [LD] Primary Wound Type: Other [LD] Additional Comments: BOIL [LD] Recorded by:  [LD] Helen Ramsey RN 09/29/19 2337    Row Name                Wound 09/29/19 2130 Left medial;posterior back Other (comment)    Wound - Properties Group Date first assessed: 09/29/19 [LD] Time first assessed: 2130 [LD] Present on Hospital Admission: Y [LD] Side: Left [LD] Orientation: medial;posterior [LD] Location: back [LD] Primary Wound Type: Other [LD] Additional Comments: BOIL [LD] Recorded by:  [LD] Helen Ramsey RN 09/29/19 2340    Row Name 10/01/19 1545             Outcome Summary/Treatment Plan (SLP)    Daily Summary of Progress (SLP)  prepare for discharge  -      Plan for Continued Treatment (SLP)  Pt reports back to baseline. No concerns/needs  -SM      Recorded by [SM] Gloria Pollard MS CCC-SLP 10/01/19 1605        User Key  (r) = Recorded By, (t) = Taken By, (c) = Cosigned By    Initials Name Effective Dates Discipline     Gloria Pollard, MS CCC-SLP 06/22/15 -  SLP    LD Helen Ramsey RN 07/24/19 -  Nurse          Outcome Summary  Outcome Summary/Treatment Plan (SLP)  Daily Summary of Progress (SLP): prepare for discharge (10/01/19 1545 : Gloria Pollard, MS CCC-SLP)  Plan  for Continued Treatment (SLP): Pt reports back to baseline. No concerns/needs (10/01/19 1545 : Gloria Pollard, MS [facility]-SLP)  SLP GOALS     Row Name 10/01/19 1545 09/30/19 1610          Attention Goal 1 (SLP)    Improve Attention by Goal 1 (SLP)  attending to task;complete sustained attention task;100%;independently (over 90% accuracy)  -  attending to task;complete sustained attention task;100%;independently (over 90% accuracy)  -RD (r) CW (t) RD (c)     Time Frame (Attention Goal 1, SLP)  short term goal (STG);by discharge  -SM  short term goal (STG);by discharge  -RD (r) CW (t) RD (c)     Progress/Outcomes (Attention Goal 1, SLP)  goal met  -SM  --        Reasoning Goal 1 (SLP)    Improve Reasoning Through Goal 1 (SLP)  complete basic reasoning task;100%;independently (over 90% accuracy)  -SM  complete basic reasoning task;complete analogies;100%;independently (over 90% accuracy)  -RD (r) CW (t) RD (c)     Time Frame (Reasoning Goal 1, SLP)  short term goal (STG);by discharge  -SM  short term goal (STG);by discharge  -RD (r) CW (t) RD (c)     Progress/Outcomes (Reasoning Goal 1, SLP)  goal met  -SM  --        Additional Goal 1 (SLP)    Additional Goal 1, SLP  LTG: Pt will improve cognitive-communication skills to actively participate in care w/ 100% accuracy w/o cues  -SM  LTG: Pt will improve cognitive-communication skills to actively participate in care w/ 100% accuracy w/o cues  -RD (r) CW (t) RD (c)     Time Frame (Additional Goal 1, SLP)  by discharge  -SM  by discharge  -RD (r) CW (t) RD (c)     Barriers (Additional Goal 1, SLP)  Alert, interacting without issue. Pt feels back to baseline. No further SLP needs.   -SM  --     Progress/Outcomes (Additional Goal 1, SLP)  goal met  -SM  --       User Key  (r) = Recorded By, (t) = Taken By, (c) = Cosigned By    Initials Name Provider Type    Gloria Kearney, MS CCC-SLP Speech and Language Pathologist    Libertad Simpson, MS CCC-SLP Speech and  Language Pathologist    Abbie Garcia, Speech Therapy Student Speech Therapy Student          EDUCATION  The patient has been educated in the following areas:   Cognitive Impairment Communication Impairment.    SLP Recommendation and Plan  Daily Summary of Progress (SLP): prepare for discharge     Plan for Continued Treatment (SLP): Pt reports back to baseline. No concerns/needs                        Time Calculation:   Time Calculation- SLP     Row Name 10/01/19 1608             Time Calculation- SLP    SLP Start Time  1545  -      SLP Received On  10/01/19  -        User Key  (r) = Recorded By, (t) = Taken By, (c) = Cosigned By    Initials Name Provider Type    Gloria Kearney MS CCC-SLP Speech and Language Pathologist          Therapy Charges for Today     Code Description Service Date Service Provider Modifiers Qty    [id number]  ST TREATMENT SPEECH 1 10/1/2019 Gloria Pollard MS CCC-SLP GN 1                    Gloria Pollard MS CCC-SLP  10/1/2019

## 2019-10-01 NOTE — PROGRESS NOTES
Case Management Discharge Note    Final Note: [facility] scheduled for 1400 today.     Destination - Selection Complete      Service Provider Request Status Selected Services Address Phone Number Fax Number    [facility] Selected Intermediate Care [address], [place] [id number] [phone]      Durable Medical Equipment      No service has been selected for the patient.      Dialysis/Infusion - Selection Complete      Service Provider Request Status Selected Services Address Phone Number Fax Number    [facility] Selected Dialysis [address], [place] [id number] [phone]      Home Medical Care      No service has been selected for the patient.      Therapy      No service has been selected for the patient.      Community Resources      No service has been selected for the patient.        Transportation Services  Ambulance: [facility]    Final Discharge Disposition Code: 04 - intermediate care facility

## 2019-10-01 NOTE — CONSULTS
"Patient gave permission for bedside visit. She shares that she has been living with diabetes, \"for many years\" and is currently at [facility]. She shares that her care is provided with  Monitoring and giving ordered medication. Discussed healthy eating from meals provided and when visitors may bring treats. She denies any questions, concerns or fears with her diabetes management at this time.   "

## 2019-10-01 NOTE — PROGRESS NOTES
Continued Stay Note  [facility]     Patient Name: Joy Bueno  MRN: [id number]  Today's Date: 10/1/2019    Admit Date: 9/29/2019    Discharge Plan     Row Name 10/01/19 1354       Plan    Plan  [facility]    Patient/Family in Agreement with Plan  yes    Plan Comments  [facility] is scheduled for 10/2 at 1200. CM will continue to follow.    Final Discharge Disposition Code  04 - [facility]    Row Name 10/01/19 1254       Plan    Plan  [facility]    Patient/Family in Agreement with Plan  yes    Final Note  [facility] scheduled for 1400 today.     Row Name 10/01/19 1108       Plan    Plan  [facility]    Patient/Family in Agreement with Plan  yes    Final Discharge Disposition Code  04 - [facility]    Final Note  Plan is back to [facility]. Wheelchair van scheduled for 13:30 today. Will need to be at the [address] by 13:20. Nurse to call report to [phone]. CM will fax discharge summary to facility. Please send any hard scripts with patient.        Discharge Codes    No documentation.       Expected Discharge Date and Time     Expected Discharge Date Expected Discharge Time    Oct 1, 2019             Carlos Enrique Marquez RN

## 2019-10-01 NOTE — PLAN OF CARE
Problem: Patient Care Overview  Goal: Plan of Care Review  Outcome: Ongoing (interventions implemented as appropriate)   10/01/19 8016   Coping/Psychosocial   Plan of Care Reviewed With patient   SLP treatment completed. No further SLP needs. Will sign-off. Please see note for further details and recommendations.

## 2019-10-01 NOTE — DISCHARGE SUMMARY
[facility] Medicine Services  DISCHARGE SUMMARY    Patient Name: Joy Bueno  : 1950  MRN: [id number]    Date of Admission: 2019  Date of Discharge:  10/2/2019  Primary Care Physician: Golyd Dior MD    Consults     Date and Time Order Name Status Description    2019 1621 Inpatient Infectious Diseases Consult Completed     2019 0030 Inpatient Nephrology Consult Completed     2019 0030 Inpatient Neurology Consult Stroke Completed     2019 1518 Inpatient Nephrology Consult Completed           Hospital Course     Presenting Problem:   Cerebellar infarct (CMS/[facility]) [I63.9]    Active Hospital Problems    Diagnosis  POA   • **Cerebellar infarct (CMS/[facility]) [I63.9]  Yes   • Hemodialysis patient (CMS/HCC) [Z99.2]  Not Applicable   • Excoriation of buttock [S30.810A]  Yes   • Dizziness [R42]  Yes   • Supratherapeutic INR [R79.1]  Yes   • Abscess of back s/p drainage as outpatient  [L02.212]  Yes   • Anticoagulated on Coumadin [Z79.01]  Not Applicable   • History of deep vein thrombosis (DVT) of brachial vein of left upper extremity (CMS/HCC) [I82.622]  Yes   • Chronic diastolic heart failure (CMS/HCC) [I50.32]  Yes   • ESRD (end stage renal disease) (CMS/HCC) [N18.6]  Yes   • Type 2 diabetes mellitus with chronic kidney disease on chronic dialysis, with long-term current use of insulin (CMS/HCC) [E11.22, N18.6, Z99.2, Z79.4]  Not Applicable   • Essential hypertension [I10]  Yes   • Morbid obesity (CMS/HCC) [E66.01]  Yes   • Anxiety [F41.9]  Yes      Resolved Hospital Problems   No resolved problems to display.          Hospital Course:  Joy Bueno is a 69 y.o. female with a history of hypertension, CAD, type 2 diabetes, end-stage renal disease on HD , history of DVT on Coumadin, and history of cerebellar CVA who presented to [facility] for worsening dizziness and lightheadedness and concern for CVA. She had an MRI brain that showed a  chronic infarct of right inferior cerebellum, chronic ischemic changes around ventricles, but no acute findings of infarct. Neurology was consulted and believed her current dizziness and lightheadedness were secondary to post-stroke recrudescence. She will continue aspirin 81 mg daily and atorvastatin 80 mg daily. Meclizine as needed for dizziness. She has chronic wounds on her back, ID evaluated, no antibiotics needed.    Patient's BP was elevated the day prior to discharge and imdur was increased to 60 mg daily. BP was borderline on dialysis today and if she has issues with hypotension on dialysis would consider decreasing back to 30 mg daily.     INR was therapeutic on discharge. Her home dosing was decreased to 5 mg daily as she was supra therapuetic on admission. Recommend checking INR in 3 days.      She was discharged on 10/2/2019 to return to [facility].    Discharge Follow Up Recommendations for labs/diagnostics:   Primary care doctor/doctor at [facility] in [place], Recommend PT/INR , Re: warfarin dosing for DVT treatment    Day of Discharge     HPI:   No acute events overnight. Continues to feel dizzy intermittently, denies nausea or vomiting.     Review of Systems  Gen- No fevers, chills  CV- No chest pain, palpitations  Resp- No cough, dyspnea  GI- No N/V/D, abd pain    All other systems reviewed and are negative.    Vital Signs:   Temp:  [97.5 °F (36.4 °C)-98.4 °F (36.9 °C)] 97.5 °F (36.4 °C)  Heart Rate:  [69-78] 69  Resp:  [18-20] 18  BP: (138-187)/(68-95) 172/92     Physical Exam:  Constitutional: No acute distress, awake, alert  HENT: NCAT, mucous membranes moist  Respiratory: Clear to auscultation bilaterally, respiratory effort normal   Cardiovascular: RRR, no murmurs, rubs, or gallops, palpable pedal pulses bilaterally, no lower extremity edema  Gastrointestinal: Positive bowel sounds, soft, nontender, nondistended  Psychiatric: Appropriate affect, cooperative  Neurologic: Oriented x  3, Cranial Nerves grossly intact to confrontation, speech clear  Skin: on mid left back there is an ulcer ~1x2 cm in size, without active drainage or bleeding, muscle is seen, meplex used to cover. On right upper back there is a 1x1 cm healing wound, without active bleeding or drainage.     Pertinent  and/or Most Recent Results     Results from last 7 days   Lab Units 10/01/19  0514 09/30/19  0538 09/29/19  1647   WBC 10*3/mm3 8.28 7.63 6.25   HEMOGLOBIN g/dL 11.0* 11.0* 10.8*   HEMATOCRIT % 38.3 38.0 37.3   PLATELETS 10*3/mm3 238 261 247   SODIUM mmol/L 138 139 137   POTASSIUM mmol/L 4.5 4.5 4.8   CHLORIDE mmol/L 103 102 101   CO2 mmol/L 22.0 22.0 22.0   BUN mg/dL 43* 64* 62*   CREATININE mg/dL 3.74* 4.91* 4.33*   GLUCOSE mg/dL 277* 234* 259*   CALCIUM mg/dL 8.5* 8.9 8.5*     Results from last 7 days   Lab Units 10/01/19  0514 09/30/19  0538 09/29/19  1647   BILIRUBIN mg/dL 0.4  --  0.4   ALK PHOS U/L 121*  --  106   ALT (SGPT) U/L 6  --  7   AST (SGOT) U/L 9  --  10   PROTIME Seconds 32.9* 39.9* 39.7*   INR  3.38* 4.33* 4.31*   APTT seconds  --  58.6*  --      Results from last 7 days   Lab Units 09/30/19  0538   CHOLESTEROL mg/dL 118   TRIGLYCERIDES mg/dL 246*   HDL CHOL mg/dL 31*     Results from last 7 days   Lab Units 09/30/19  0538   TSH uIU/mL 4.250*   HEMOGLOBIN A1C % 8.80*       Brief Urine Lab Results  (Last result in the past 365 days)      Color   Clarity   Blood   Leuk Est   Nitrite   Protein   CREAT   Urine HCG        09/01/19 1214 Yellow Turbid Moderate (2+) Large (3+) Negative >=300 mg/dL (3+)               Microbiology Results Abnormal     None          Imaging Results (all)     Procedure Component Value Units Date/Time    CT Head Without Contrast [[id number]] Collected:  09/29/19 1810     Updated:  09/30/19 0827    Narrative:       EXAMINATION: CT HEAD WO CONTRAST - 09/29/2019     INDICATION: Vertigo, nausea and vomiting.      TECHNIQUE: 5 mm unenhanced images through the brain.     The radiation  dose reduction device was turned on for each scan per the  ALARA (As Low as Reasonably Achievable) protocol.     COMPARISON: 04/27/2019 head CT scan.     FINDINGS: History indicates vertigo x 2 days, nausea and vomiting. The  calvarium appears intact. Included paranasal sinuses and mastoids appear  clear except for an approximately 2.5 cm left maxillary sinus mucous  cyst. There is focal dense edema in the inferior right cerebellum and  much milder focal edema in the inferior left cerebellum, consistent with  recent or subacute infarcts. There is probably involvement of the  inferior cerebellar vermis as well. There appear to be a couple of  punctate old lacunar infarcts in the right basal ganglia. There is no  evidence of edema/infarct elsewhere, no evidence of hemorrhage,  hydrocephalus, mass or mass effect or abnormal extra-axial collection.        Impression:       1. Inferior cerebellar edema, right greater than left, most likely  representing subacute infarcts. No evidence of hemorrhage.  2. Old lacunar infarct in the right basal ganglia.  3. No evidence of acute intracranial disease elsewhere.     DICTATED:   09/29/2019  EDITED/ls :   09/29/2019      This report was finalized on 9/30/2019 8:24 AM by DR. Valentino Lo MD.       MRI Brain Without Contrast [[id number]] Collected:  09/29/19 2144     Updated:  09/29/19 2146    Narrative:       MRI Brain WO    INDICATION:   Dizziness for the past 2 days with nausea. Evaluate for stroke.    TECHNIQUE:   MRI of the brain without IV contrast.    COMPARISON:    None available.    FINDINGS:  Diffusion-weighted images show no evidence of recent infarct. The routine brain images demonstrate encephalomalacia in the right posterior inferior cerebellar artery distribution consistent with an old infarct. There is no evidence of mass lesion  hemorrhage or edema. Generalized atrophy is seen and there are moderately severe chronic ischemic changes around the ventricles bilaterally.  Extra-axial structures are unremarkable.      Impression:       Chronic infarct right inferior cerebellum. Chronic ischemic changes around the ventricles. No acute findings.    Signer Name: Ricardo Fowler MD   Signed: 9/29/2019 9:44 PM   Workstation Name: [id number]    [facility]          Results for orders placed during the hospital encounter of 09/29/19   Duplex Carotid Ultrasound CAR    Narrative · Right internal carotid artery stenosis of 0-49%.  · Left internal carotid artery stenosis of 0-49%.          Results for orders placed during the hospital encounter of 09/29/19   Duplex Carotid Ultrasound CAR    Narrative · Right internal carotid artery stenosis of 0-49%.  · Left internal carotid artery stenosis of 0-49%.          Results for orders placed during the hospital encounter of 09/29/19   Adult Transthoracic Echo Complete W/ Cont if Necessary Per Protocol    Narrative · Estimated EF = 60%.  · Left ventricular systolic function is normal.            Discharge Details        Discharge Medications      New Medications      Instructions Start Date   aspirin 81 MG chewable tablet   81 mg, Oral, Daily      meclizine 12.5 MG tablet  Commonly known as:  ANTIVERT   12.5 mg, Oral, 3 Times Daily PRN         Changes to Medications      Instructions Start Date   atorvastatin 80 MG tablet  Commonly known as:  LIPITOR  What changed:    · medication strength  · how much to take   80 mg, Oral, Nightly      isosorbide mononitrate 60 MG 24 hr tablet  Commonly known as:  IMDUR  What changed:    · medication strength  · how much to take   60 mg, Oral, Daily         Continue These Medications      Instructions Start Date   acetaminophen 325 MG tablet  Commonly known as:  TYLENOL   650 mg, Oral, Every 6 Hours PRN      albuterol sulfate  (90 Base) MCG/ACT inhaler  Commonly known as:  PROVENTIL HFA;VENTOLIN HFA;PROAIR HFA   2 puffs, Inhalation, Every 4 Hours PRN      calcitriol 0.25 MCG  capsule  Commonly known as:  ROCALTROL   0.25 mcg, Oral, Daily      carvedilol 25 MG tablet  Commonly known as:  COREG   25 mg, Oral, 2 Times Daily With Meals      diltiaZEM (CARDIZEM) 2% cream   Topical, 3 Times Daily PRN      docusate sodium 100 MG capsule  Commonly known as:  COLACE   100 mg, Oral, 2 Times Daily      fluticasone-salmeterol 500-50 MCG/DOSE DISKUS  Commonly known as:  ADVAIR   1 puff, Inhalation, 2 Times Daily - RT      HYDROcodone-acetaminophen 5-325 MG per tablet  Commonly known as:  NORCO   1 tablet, Oral, Every 8 Hours PRN      insulin aspart 100 UNIT/ML solution pen-injector sc pen  Commonly known as:  novoLOG FLEXPEN   5 Units, Subcutaneous, 3 Times Daily With Meals      ipratropium-albuterol 0.5-2.5 mg/3 ml nebulizer  Commonly known as:  DUO-NEB   3 mL, Nebulization, Every 6 Hours PRN      Lidocaine 2 % gel   Apply externally, 3 Times Daily PRN      melatonin 3 MG tablet   3 mg, Oral, Nightly      ondansetron 4 MG tablet  Commonly known as:  ZOFRAN   4 mg, Oral, Every 8 Hours PRN      pantoprazole 40 MG EC tablet  Commonly known as:  PROTONIX   40 mg, Oral, Daily      polyethylene glycol pack packet  Commonly known as:  MIRALAX   17 g, Oral, 2 Times Daily      PREPARATION H 0.25-88.44 % suppository suppository  Generic drug:  phenylephrine-cocoa Butter   Rectal, As Needed      promethazine 25 MG tablet  Commonly known as:  PHENERGAN   25 mg, Oral, 3 Times Weekly, On dialysis days (Mon Weds Fri)       saccharomyces boulardii 250 MG capsule  Commonly known as:  FLORASTOR   250 mg, Oral, Daily      sertraline 50 MG tablet  Commonly known as:  ZOLOFT   50 mg, Oral, Daily      sevelamer 800 MG tablet  Commonly known as:  RENVELA   800 mg, Oral, 3 Times Daily With Meals, 0800, 1200 & 1700      TAB-A-STAR tablet   1 tablet, Oral, Daily      vitamin D 88127 units capsule capsule  Commonly known as:  ERGOCALCIFEROL   50,000 Units, Oral, Weekly, TAKES ON WEDNESDAYS       warfarin 2.5 MG  tablet  Commonly known as:  COUMADIN   2.5 mg, Oral, Daily Warfarin, Take with 4mg for a total of 6.5mg       warfarin 4 MG tablet  Commonly known as:  COUMADIN   4 mg, Oral, Daily Warfarin, Take with 2.5mg for a total of 6.5mg              Allergies   Allergen Reactions   • Geodon [Ziprasidone Hcl] Unknown (See Comments)     PT DOESN'T REMEMBER   • Haldol [Haloperidol Lactate] Unknown (See Comments)     PT DOESN'T REMEMBER   • Seroquel [Quetiapine Fumarate] Unknown (See Comments)     PT DOESN'T REMEMBER         Discharge Disposition:  Skilled Nursing Facility (DC - External)    Diet:  Hospital:  Diet Order   Procedures   • Diet Regular; Cardiac, Consistent Carbohydrate     Discharge:     Discharge Activity:         CODE STATUS:    Code Status and Medical Interventions:   Ordered at: 09/29/19 1958     Level Of Support Discussed With:    Patient     Code Status:    No CPR     Medical Interventions (Level of Support Prior to Arrest):    Full         Future Appointments   Date Time Provider Department Center   10/2/2019  7:00 AM DIALYSIS STATION [facility]  [facility]       Additional Instructions for the Follow-ups that You Need to Schedule     Discharge Follow-up with PCP   As directed       Currently Documented PCP:    Goldy Dior MD    PCP Phone Number:    [phone]     Follow Up Details:  supratherapeutic INR on discharge from the hospital, will need to restart warfarin when INR appropriate               Time Spent on Discharge:  40 minutes    Electronically signed by Tri Gary MD, 10/01/19, 11:18 AM.

## 2019-10-02 ENCOUNTER — APPOINTMENT (OUTPATIENT)
Dept: NEPHROLOGY | Facility: HOSPITAL | Age: 69
End: 2019-10-02

## 2019-10-02 VITALS
TEMPERATURE: 98.2 F | HEART RATE: 86 BPM | OXYGEN SATURATION: 100 % | SYSTOLIC BLOOD PRESSURE: 117 MMHG | BODY MASS INDEX: 48.93 KG/M2 | HEIGHT: 64 IN | RESPIRATION RATE: 18 BRPM | DIASTOLIC BLOOD PRESSURE: 66 MMHG | WEIGHT: 286.6 LBS

## 2019-10-02 PROBLEM — R79.1 SUPRATHERAPEUTIC INR: Status: RESOLVED | Noted: 2019-07-31 | Resolved: 2019-10-02

## 2019-10-02 LAB
ALBUMIN SERPL-MCNC: 2.9 G/DL (ref 3.5–5.2)
ALBUMIN/GLOB SERPL: 0.7 G/DL
ALP SERPL-CCNC: 102 U/L (ref 39–117)
ALT SERPL W P-5'-P-CCNC: 5 U/L (ref 1–33)
ANION GAP SERPL CALCULATED.3IONS-SCNC: 12 MMOL/L (ref 5–15)
AST SERPL-CCNC: 8 U/L (ref 1–32)
BASOPHILS # BLD AUTO: 0.04 10*3/MM3 (ref 0–0.2)
BASOPHILS NFR BLD AUTO: 0.5 % (ref 0–1.5)
BILIRUB SERPL-MCNC: 0.6 MG/DL (ref 0.2–1.2)
BUN BLD-MCNC: 58 MG/DL (ref 8–23)
BUN/CREAT SERPL: 12.7 (ref 7–25)
CALCIUM SPEC-SCNC: 8.6 MG/DL (ref 8.6–10.5)
CHLORIDE SERPL-SCNC: 103 MMOL/L (ref 98–107)
CO2 SERPL-SCNC: 23 MMOL/L (ref 22–29)
CREAT BLD-MCNC: 4.56 MG/DL (ref 0.57–1)
DEPRECATED RDW RBC AUTO: 70.6 FL (ref 37–54)
EOSINOPHIL # BLD AUTO: 0.32 10*3/MM3 (ref 0–0.4)
EOSINOPHIL NFR BLD AUTO: 3.9 % (ref 0.3–6.2)
ERYTHROCYTE [DISTWIDTH] IN BLOOD BY AUTOMATED COUNT: 18.7 % (ref 12.3–15.4)
GFR SERPL CREATININE-BSD FRML MDRD: 12 ML/MIN/1.73
GFR SERPL CREATININE-BSD FRML MDRD: ABNORMAL ML/MIN/{1.73_M2}
GLOBULIN UR ELPH-MCNC: 4.4 GM/DL
GLUCOSE BLD-MCNC: 207 MG/DL (ref 65–99)
GLUCOSE BLDC GLUCOMTR-MCNC: 161 MG/DL (ref 70–130)
GLUCOSE BLDC GLUCOMTR-MCNC: 250 MG/DL (ref 70–130)
HCT VFR BLD AUTO: 38.3 % (ref 34–46.6)
HGB BLD-MCNC: 11 G/DL (ref 12–15.9)
IMM GRANULOCYTES # BLD AUTO: 0.06 10*3/MM3 (ref 0–0.05)
IMM GRANULOCYTES NFR BLD AUTO: 0.7 % (ref 0–0.5)
INR PPP: 2.67 (ref 0.85–1.16)
LYMPHOCYTES # BLD AUTO: 1.94 10*3/MM3 (ref 0.7–3.1)
LYMPHOCYTES NFR BLD AUTO: 23.6 % (ref 19.6–45.3)
MCH RBC QN AUTO: 29.2 PG (ref 26.6–33)
MCHC RBC AUTO-ENTMCNC: 28.7 G/DL (ref 31.5–35.7)
MCV RBC AUTO: 101.6 FL (ref 79–97)
MONOCYTES # BLD AUTO: 0.64 10*3/MM3 (ref 0.1–0.9)
MONOCYTES NFR BLD AUTO: 7.8 % (ref 5–12)
NEUTROPHILS # BLD AUTO: 5.22 10*3/MM3 (ref 1.7–7)
NEUTROPHILS NFR BLD AUTO: 63.5 % (ref 42.7–76)
NRBC BLD AUTO-RTO: 0 /100 WBC (ref 0–0.2)
PLATELET # BLD AUTO: 230 10*3/MM3 (ref 140–450)
PMV BLD AUTO: 10.7 FL (ref 6–12)
POTASSIUM BLD-SCNC: 4.9 MMOL/L (ref 3.5–5.2)
PROT SERPL-MCNC: 7.3 G/DL (ref 6–8.5)
PROTHROMBIN TIME: 27.3 SECONDS (ref 11.2–14.3)
RBC # BLD AUTO: 3.77 10*6/MM3 (ref 3.77–5.28)
SODIUM BLD-SCNC: 138 MMOL/L (ref 136–145)
WBC NRBC COR # BLD: 8.22 10*3/MM3 (ref 3.4–10.8)

## 2019-10-02 PROCEDURE — 85025 COMPLETE CBC W/AUTO DIFF WBC: CPT | Performed by: INTERNAL MEDICINE

## 2019-10-02 PROCEDURE — 80053 COMPREHEN METABOLIC PANEL: CPT | Performed by: INTERNAL MEDICINE

## 2019-10-02 PROCEDURE — 82962 GLUCOSE BLOOD TEST: CPT

## 2019-10-02 PROCEDURE — 85610 PROTHROMBIN TIME: CPT | Performed by: INTERNAL MEDICINE

## 2019-10-02 PROCEDURE — 25010000002 HEPARIN (PORCINE) PER 1000 UNITS: Performed by: INTERNAL MEDICINE

## 2019-10-02 PROCEDURE — 63710000001 INSULIN LISPRO (HUMAN) PER 5 UNITS: Performed by: NURSE PRACTITIONER

## 2019-10-02 RX ORDER — ISOSORBIDE MONONITRATE 60 MG/1
60 TABLET, EXTENDED RELEASE ORAL DAILY
Start: 2019-10-02 | End: 2019-11-15 | Stop reason: HOSPADM

## 2019-10-02 RX ORDER — WARFARIN SODIUM 4 MG/1
4 TABLET ORAL
Qty: 30 TABLET | Refills: 0
Start: 2019-10-02 | End: 2019-10-02 | Stop reason: HOSPADM

## 2019-10-02 RX ORDER — ATORVASTATIN CALCIUM 80 MG/1
80 TABLET, FILM COATED ORAL NIGHTLY
Qty: 30 TABLET | Refills: 0
Start: 2019-10-02 | End: 2021-02-17 | Stop reason: HOSPADM

## 2019-10-02 RX ORDER — HYDROCODONE BITARTRATE AND ACETAMINOPHEN 5; 325 MG/1; MG/1
1 TABLET ORAL EVERY 8 HOURS PRN
Qty: 9 TABLET | Refills: 0 | OUTPATIENT
Start: 2019-10-02 | End: 2019-10-02 | Stop reason: SDUPTHER

## 2019-10-02 RX ORDER — HYDROCODONE BITARTRATE AND ACETAMINOPHEN 5; 325 MG/1; MG/1
1 TABLET ORAL EVERY 8 HOURS PRN
Qty: 9 TABLET | Refills: 0 | Status: SHIPPED | OUTPATIENT
Start: 2019-10-02 | End: 2020-02-15

## 2019-10-02 RX ORDER — WARFARIN SODIUM 2.5 MG/1
2.5 TABLET ORAL
Qty: 30 TABLET | Refills: 0
Start: 2019-10-02 | End: 2019-10-02 | Stop reason: SDUPTHER

## 2019-10-02 RX ORDER — WARFARIN SODIUM 2.5 MG/1
5 TABLET ORAL
Qty: 30 TABLET | Refills: 0
Start: 2019-10-02 | End: 2019-11-07

## 2019-10-02 RX ADMIN — HEPARIN SODIUM 1800 UNITS: 1000 INJECTION, SOLUTION INTRAVENOUS; SUBCUTANEOUS at 09:36

## 2019-10-02 RX ADMIN — INSULIN LISPRO 2 UNITS: 100 INJECTION, SOLUTION INTRAVENOUS; SUBCUTANEOUS at 10:45

## 2019-10-02 RX ADMIN — Medication 250 MG: at 09:39

## 2019-10-02 RX ADMIN — SODIUM CHLORIDE, PRESERVATIVE FREE 10 ML: 5 INJECTION INTRAVENOUS at 11:34

## 2019-10-02 RX ADMIN — CARVEDILOL 25 MG: 12.5 TABLET, FILM COATED ORAL at 10:02

## 2019-10-02 RX ADMIN — MULTIVITAMIN TABLET 1 TABLET: TABLET at 09:39

## 2019-10-02 RX ADMIN — BISACODYL 10 MG: 10 SUPPOSITORY RECTAL at 01:36

## 2019-10-02 RX ADMIN — ISOSORBIDE MONONITRATE 60 MG: 60 TABLET, EXTENDED RELEASE ORAL at 11:30

## 2019-10-02 RX ADMIN — CALCITRIOL 0.25 MCG: 0.25 CAPSULE ORAL at 09:39

## 2019-10-02 RX ADMIN — POLYETHYLENE GLYCOL 3350 17 G: 17 POWDER, FOR SOLUTION ORAL at 09:39

## 2019-10-02 RX ADMIN — INSULIN LISPRO 4 UNITS: 100 INJECTION, SOLUTION INTRAVENOUS; SUBCUTANEOUS at 11:33

## 2019-10-02 RX ADMIN — SEVELAMER CARBONATE 0.8 G: 800 POWDER, FOR SUSPENSION ORAL at 09:36

## 2019-10-02 RX ADMIN — ASPIRIN 81 MG 81 MG: 81 TABLET ORAL at 09:39

## 2019-10-02 RX ADMIN — PANTOPRAZOLE SODIUM 40 MG: 40 TABLET, DELAYED RELEASE ORAL at 09:37

## 2019-10-02 RX ADMIN — SERTRALINE HYDROCHLORIDE 50 MG: 25 TABLET ORAL at 11:30

## 2019-10-02 NOTE — PLAN OF CARE
Problem: Patient Care Overview  Goal: Plan of Care Review  Outcome: Ongoing (interventions implemented as appropriate)   10/01/19 1530   Plan of Care Review   Progress improving   OTHER   Outcome Summary Pt A&O x's 4. Denies pain. No s/s of distress noted. Encouraged to help with turning and pulling up in the bed. Attempted to transfer pt back to [place] as order by Dr. Gary. Nurse Isis reports they will not take the pt back with current BP of 170/90. Song Rey from [facility] and [facility] nurse notified. Dr. Gary ordered IV hydralzine 10mg now. This writer attempted to explain the EMS time had been pushed back and that pt would be monitored an additional 2 hours before returning to [place] with talking with the nurse Isis. This nurse continues to refuse the pts return. Per Song in cas4e management pt will return tomorrow at 12 pm. Dressings changed per Dr. Cooper and IV DC'd per VO from Dr. Cooper at bedside.Pt tolerated dressing change and IV removal well. Bed locked in low position, callbell in reach, continue to monitor.        Problem: Fall Risk (Adult)  Goal: Identify Related Risk Factors and Signs and Symptoms  Outcome: Ongoing (interventions implemented as appropriate)   10/01/19 1530   Fall Risk (Adult)   Related Risk Factors (Fall Risk) bladder function altered;fatigue/slow reaction;gait/mobility problems;sleep pattern alteration;environment unfamiliar   Signs and Symptoms (Fall Risk) presence of risk factors     Goal: Absence of Fall  Outcome: Ongoing (interventions implemented as appropriate)   10/01/19 1530   Fall Risk (Adult)   Absence of Fall achieves outcome       Problem: Skin Injury Risk (Adult)  Goal: Identify Related Risk Factors and Signs and Symptoms  Outcome: Ongoing (interventions implemented as appropriate)    Goal: Skin Health and Integrity  Outcome: Ongoing (interventions implemented as appropriate)      Problem: Hemodialysis (Adult)  Goal: Signs and Symptoms of Listed  Potential Problems Will be Absent, Minimized or Managed (Hemodialysis)  Outcome: Ongoing (interventions implemented as appropriate)

## 2019-10-02 NOTE — PROGRESS NOTES
[facility] Medicine Services  PROGRESS NOTE    Patient Name: Joy Bueno  : 1950  MRN: [id number]    Date of Admission: 2019  Primary Care Physician: Goldy Dior MD    Subjective   Subjective     CC:  Dizziness     HPI:  No acute events. BP better controlled on dialysis. Has BM today. Discussed discharge back to [facility] and patient feels comfortable with this plan.     Review of Systems  Gen- No fevers, chills  CV- No chest pain, palpitations  Resp- No cough, dyspnea  GI- No N/V/D, abd pain    All other systems reviewed and negative.     Objective   Objective     Vital Signs:   Temp:  [97 °F (36.1 °C)-98.6 °F (37 °C)] 97 °F (36.1 °C)  Heart Rate:  [72-91] 81  Resp:  [18-24] 18  BP: ()/(68-97) 132/78  Total (NIH Stroke Scale): 0     Physical Exam:  Constitutional: No acute distress, awake, alert  HENT: NCAT, mucous membranes moist  Respiratory: + rhonchi, respiratory effort normal   Cardiovascular: RRR, no murmurs, rubs, or gallops, palpable pedal pulses bilaterally  Gastrointestinal: Positive bowel sounds, soft, nontender, nondistended  Musculoskeletal: trace bilateral ankle edema  Psychiatric: Appropriate affect, cooperative  Neurologic: Oriented x 3, strength symmetric in all extremities, Cranial Nerves grossly intact to confrontation, speech clear  Skin: No rashes      Results Reviewed:    Results from last 7 days   Lab Units 10/02/19  0613 10/01/19  0514 09/30/19  0538   WBC 10*3/mm3 8.22 8.28 7.63   HEMOGLOBIN g/dL 11.0* 11.0* 11.0*   HEMATOCRIT % 38.3 38.3 38.0   PLATELETS 10*3/mm3 230 238 261   INR  2.67* 3.38* 4.33*     Results from last 7 days   Lab Units 10/02/19  0613 10/01/19  0514 09/30/19  0538 19  1647   SODIUM mmol/L 138 138 139 137   POTASSIUM mmol/L 4.9 4.5 4.5 4.8   CHLORIDE mmol/L 103 103 102 101   CO2 mmol/L 23.0 22.0 22.0 22.0   BUN mg/dL 58* 43* 64* 62*   CREATININE mg/dL 4.56* 3.74* 4.91* 4.33*   GLUCOSE mg/dL 207* 277*  234* 259*   CALCIUM mg/dL 8.6 8.5* 8.9 8.5*   ALT (SGPT) U/L 5 6  --  7   AST (SGOT) U/L 8 9  --  10     Estimated Creatinine Clearance: 15.8 mL/min (A) (by C-G formula based on SCr of 4.56 mg/dL (H)).    Microbiology Results Abnormal     None          Imaging Results (last 24 hours)     ** No results found for the last 24 hours. **          Results for orders placed during the hospital encounter of 09/29/19   Adult Transthoracic Echo Complete W/ Cont if Necessary Per Protocol    Narrative · Estimated EF = 60%.  · Left ventricular systolic function is normal.          I have reviewed the medications:  Scheduled Meds:  aspirin 81 mg Oral Daily   atorvastatin 80 mg Oral Nightly   budesonide-formoterol 2 puff Inhalation BID - RT   calcitriol 0.25 mcg Oral Daily   carvedilol 25 mg Oral BID With Meals   insulin lispro 0-7 Units Subcutaneous 4x Daily With Meals & Nightly   isosorbide mononitrate 60 mg Oral Daily   melatonin 5 mg Oral Nightly   multivitamin 1 tablet Oral Daily   pantoprazole 40 mg Oral Daily   saccharomyces boulardii 250 mg Oral Daily   sennosides-docusate sodium 2 tablet Oral Nightly   sertraline 50 mg Oral Daily   sevelamer 800 mg Oral TID With Meals   sodium chloride 10 mL Intravenous Q12H     Continuous Infusions:   PRN Meds:.•  acetaminophen  •  bisacodyl  •  dextrose  •  dextrose  •  glucagon (human recombinant)  •  heparin (porcine)  •  HYDROcodone-acetaminophen  •  ipratropium-albuterol  •  meclizine  •  ondansetron **OR** ondansetron  •  polyethylene glycol  •  sodium chloride      Assessment/Plan   Assessment / Plan     Active Hospital Problems    Diagnosis  POA   • **Cerebellar infarct (CMS/HCC) [I63.9]  Yes   • Hemodialysis patient (CMS/[facility]) [Z99.2]  Not Applicable   • Excoriation of buttock [S30.810A]  Yes   • Dizziness [R42]  Yes   • Abscess of back s/p drainage as outpatient 8/22 [L02.212]  Yes   • Anticoagulated on Coumadin [Z79.01]  Not Applicable   • History of deep vein thrombosis (DVT)  of brachial vein of left upper extremity (CMS/[facility]) [I82.622]  Yes   • Chronic diastolic heart failure (CMS/[facility]) [I50.32]  Yes   • ESRD (end stage renal disease) (CMS/[facility]) [N18.6]  Yes   • Type 2 diabetes mellitus with chronic kidney disease on chronic dialysis, with long-term current use of insulin (CMS/[facility]) [E11.22, N18.6, Z99.2, Z79.4]  Not Applicable   • Essential hypertension [I10]  Yes   • Morbid obesity (CMS/[facility]) [E66.01]  Yes   • Anxiety [F41.9]  Yes      Resolved Hospital Problems    Diagnosis Date Resolved POA   • Supratherapeutic INR [R79.1] 10/02/2019 Yes        Brief Hospital Course to date:  Joy Bueno is a 69 y.o. female  With hx of HTN,  obesity, T2DM, ESRD, hx of DVT on coumadin who presented with dizziness and lightheadedness     Dizziness and lightheadedness  -likely 2/2 post-stroke recrudescence, discussed with Dr. Shah (neurologist) & she has hx of cerebral infarct, believes symptoms are not new, but re-emerging due to current illness  -MRI brain with chronic infarct right inferior cerebellum, chronic ischemic changes around the ventricles no acute findings  -continue good blood pressure control  -echocardiogram reviewed   -neuro c/s-CT angiogram       Wound on back  - ID consult, does not appear overtly infected and wound may be chronic  - ID recommended no antibiotics and good wound care   -wound care consult  -will continue pain control inc norco to 10 mg and morphine prn     Supratherapeutic INR  -on coumadin for hx of DVT diagnosed in 2/2019   - Coumadin was held. Therapeutic today. Resume lower home dosing of 5 mg daily      HTN: improved control with increase in imdur. Monitor closely on dialysis      ESRD  -onHD MWF; she was dialyzed on schedule while inpatient   -nephrology consult     T2DM: 8.8 pending continue low dose correction insulin, and monitor     DVT Prophylaxis:  warfarin     CODE STATUS:   Code Status and Medical Interventions:   Ordered at: 09/29/19 1958     Level Of  Support Discussed With:    Patient     Code Status:    No CPR     Medical Interventions (Level of Support Prior to Arrest):    Full         Electronically signed by Carlita Jackson DO, 10/02/19, 11:20 AM.

## 2019-10-02 NOTE — DISCHARGE PLACEMENT REQUEST
"Pam Loyd (69 y.o. Female)   Song Marquez RN Case Management  [phone]    Date of Birth Social Security Number Address Home Phone MRN    1950  [address] [id number] [phone] [id number]    Taoist Marital Status          None        Admission Date Admission Type Admitting Provider Attending Provider Department, Room/Bed    9/29/19 Emergency Carlita Jackson DO Roesch, Lyndsey, DO [facility] 3F, S313/1    Discharge Date Discharge Disposition Discharge Destination         Skilled Nursing Facility (DC - External)              Attending Provider:  Carlita Jackson DO    Allergies:  Geodon [Ziprasidone Hcl], Haldol [Haloperidol Lactate], Seroquel [Quetiapine Fumarate]    Isolation:  None   Infection:  None   Code Status:  No CPR    Ht:  162.6 cm (64.02\")   Wt:  130 kg (286 lb 9.6 oz)    Admission Cmt:  None   Principal Problem:  Cerebellar infarct (CMS/HCC) [I63.9]                 Active Insurance as of 9/29/2019     Primary Coverage     Payor Plan Insurance Group Employer/Plan Group    MEDICARE MEDICARE A & B      Payor Plan Address Payor Plan Phone Number Payor Plan Fax Number Effective Dates    [address] [phone]  5/1/2016 - None Entered    [facility] [id number]       Subscriber Name Subscriber Birth Date Member ID       PAM LOYD 1950 [id number]           Secondary Coverage     Payor Plan Insurance Group Employer/Plan Group    [place]      Payor Plan Address Payor Plan Phone Number Payor Plan Fax Number Effective Dates    [address] [phone]  8/2/2018 - None Entered    [place] [address]       Subscriber Name Subscriber Birth Date Member ID       PAM LOYD 1950 [id number]                 Emergency Contacts      (Rel.) Home Phone Work Phone Mobile Phone    Tessie Dorado (Daughter) [phone] -- [phone]            Vital Signs (last day)     Date/Time   Temp   " Temp src   Pulse   Resp   BP   Patient Position   SpO2    10/02/19 1000   97 (36.1)   Oral   81   18   132/78   Lying   --    10/02/19 0934   --   --   87   --   98/68   --   --    10/02/19 0900   --   --   91   --   120/74   --   --    10/02/19 0830   --   --   87   --   115/79   --   --    10/02/19 0800   --   --   89   --   132/86   --   --    10/02/19 0730   --   --   78   --   145/84   --   --    10/02/19 0700   --   --   79   --   156/94   --   --    10/02/19 0630   --   --   77   --   172/94   --   --    10/02/19 0618   97 (36.1)   Oral   72   18   186/94  (Abnormal)    Lying   100    10/02/19 0401   97.9 (36.6)   Oral   77   18   175/97   Lying   98    10/01/19 2343   97.7 (36.5)   Oral   74   24   146/74   Lying   100    10/01/19 2030   --   --   76   20   --   --   --    10/01/19 1936   --   --   74   --   168/74   --   --    10/01/19 1910   97.4 (36.3)   Oral   81   24   163/82   Lying   98    10/01/19 1608   98.6 (37)   Axillary   77   18   164/76   Lying   95    10/01/19 1351   --   --   79   --   170/90   --   --    10/01/19 1102   97.5 (36.4)   Oral   69   18   172/92   Lying   100    10/01/19 0941   --   --   --   --   184/93  (Abnormal)    --   --    10/01/19 0655   --   --   69   --   184/93  (Abnormal)    --   --    10/01/19 0653   97.7 (36.5)   Oral   74   18   187/95  (Abnormal)    Lying   100    10/01/19 0422   97.5 (36.4)   Oral   78   20   164/87   Lying   100                   Discharge Summary      Carlita Jackson DO at 10/01/19 1118              [facility] Medicine Services  DISCHARGE SUMMARY    Patient Name: Joy Bueno  : 1950  MRN: [id number]    Date of Admission: 2019  Date of Discharge:  10/2/2019  Primary Care Physician: Goldy Dior MD    Consults     Date and Time Order Name Status Description    2019 1621 Inpatient Infectious Diseases Consult Completed     2019 0030 Inpatient Nephrology Consult Completed     2019 0030  Inpatient Neurology Consult Stroke Completed     9/1/2019 1518 Inpatient Nephrology Consult Completed           Hospital Course     Presenting Problem:   Cerebellar infarct (CMS/[facility]) [I63.9]    Active Hospital Problems    Diagnosis  POA   • **Cerebellar infarct (CMS/[facility]) [I63.9]  Yes   • Hemodialysis patient (CMS/HCC) [Z99.2]  Not Applicable   • Excoriation of buttock [S30.810A]  Yes   • Dizziness [R42]  Yes   • Supratherapeutic INR [R79.1]  Yes   • Abscess of back s/p drainage as outpatient 8/22 [L02.212]  Yes   • Anticoagulated on Coumadin [Z79.01]  Not Applicable   • History of deep vein thrombosis (DVT) of brachial vein of left upper extremity (CMS/HCC) [I82.622]  Yes   • Chronic diastolic heart failure (CMS/HCC) [I50.32]  Yes   • ESRD (end stage renal disease) (CMS/HCC) [N18.6]  Yes   • Type 2 diabetes mellitus with chronic kidney disease on chronic dialysis, with long-term current use of insulin (CMS/HCC) [E11.22, N18.6, Z99.2, Z79.4]  Not Applicable   • Essential hypertension [I10]  Yes   • Morbid obesity (CMS/HCC) [E66.01]  Yes   • Anxiety [F41.9]  Yes      Resolved Hospital Problems   No resolved problems to display.          Hospital Course:  Joy Bueno is a 69 y.o. female with a history of hypertension, CAD, type 2 diabetes, end-stage renal disease on HD Monday Wednesday Friday, history of DVT on Coumadin, and history of cerebellar CVA who presented to [facility] for worsening dizziness and lightheadedness and concern for CVA. She had an MRI brain that showed a chronic infarct of right inferior cerebellum, chronic ischemic changes around ventricles, but no acute findings of infarct. Neurology was consulted and believed her current dizziness and lightheadedness were secondary to post-stroke recrudescence. She will continue aspirin 81 mg daily and atorvastatin 80 mg daily. Meclizine as needed for dizziness. She has chronic wounds on her back, ID evaluated, no antibiotics needed.    Patient's BP was elevated  the day prior to discharge and imdur was increased to 60 mg daily. BP was borderline on dialysis today and if she has issues with hypotension on dialysis would consider decreasing back to 30 mg daily.     INR was therapeutic on discharge. Her home dosing was decreased to 5 mg daily as she was supra therapuetic on admission. Recommend checking INR in 3 days.      She was discharged on 10/2/2019 to return to [facility].    Discharge Follow Up Recommendations for labs/diagnostics:   Primary care doctor/doctor at [facility] in [place], Recommend PT/INR , Re: warfarin dosing for DVT treatment    Day of Discharge     HPI:   No acute events overnight. Continues to feel dizzy intermittently, denies nausea or vomiting.     Review of Systems  Gen- No fevers, chills  CV- No chest pain, palpitations  Resp- No cough, dyspnea  GI- No N/V/D, abd pain    All other systems reviewed and are negative.    Vital Signs:   Temp:  [97.5 °F (36.4 °C)-98.4 °F (36.9 °C)] 97.5 °F (36.4 °C)  Heart Rate:  [69-78] 69  Resp:  [18-20] 18  BP: (138-187)/(68-95) 172/92     Physical Exam:  Constitutional: No acute distress, awake, alert  HENT: NCAT, mucous membranes moist  Respiratory: Clear to auscultation bilaterally, respiratory effort normal   Cardiovascular: RRR, no murmurs, rubs, or gallops, palpable pedal pulses bilaterally, no lower extremity edema  Gastrointestinal: Positive bowel sounds, soft, nontender, nondistended  Psychiatric: Appropriate affect, cooperative  Neurologic: Oriented x 3, Cranial Nerves grossly intact to confrontation, speech clear  Skin: on mid left back there is an ulcer ~1x2 cm in size, without active drainage or bleeding, muscle is seen, meplex used to cover. On right upper back there is a 1x1 cm healing wound, without active bleeding or drainage.     Pertinent  and/or Most Recent Results     Results from last 7 days   Lab Units 10/01/19  0514 09/30/19  0538 09/29/19  1647   WBC 10*3/mm3 8.28 7.63 6.25    HEMOGLOBIN g/dL 11.0* 11.0* 10.8*   HEMATOCRIT % 38.3 38.0 37.3   PLATELETS 10*3/mm3 238 261 247   SODIUM mmol/L 138 139 137   POTASSIUM mmol/L 4.5 4.5 4.8   CHLORIDE mmol/L 103 102 101   CO2 mmol/L 22.0 22.0 22.0   BUN mg/dL 43* 64* 62*   CREATININE mg/dL 3.74* 4.91* 4.33*   GLUCOSE mg/dL 277* 234* 259*   CALCIUM mg/dL 8.5* 8.9 8.5*     Results from last 7 days   Lab Units 10/01/19  0514 09/30/19  0538 09/29/19  1647   BILIRUBIN mg/dL 0.4  --  0.4   ALK PHOS U/L 121*  --  106   ALT (SGPT) U/L 6  --  7   AST (SGOT) U/L 9  --  10   PROTIME Seconds 32.9* 39.9* 39.7*   INR  3.38* 4.33* 4.31*   APTT seconds  --  58.6*  --      Results from last 7 days   Lab Units 09/30/19  0538   CHOLESTEROL mg/dL 118   TRIGLYCERIDES mg/dL 246*   HDL CHOL mg/dL 31*     Results from last 7 days   Lab Units 09/30/19  0538   TSH uIU/mL 4.250*   HEMOGLOBIN A1C % 8.80*       Brief Urine Lab Results  (Last result in the past 365 days)      Color   Clarity   Blood   Leuk Est   Nitrite   Protein   CREAT   Urine HCG        09/01/19 1214 Yellow Turbid Moderate (2+) Large (3+) Negative >=300 mg/dL (3+)               Microbiology Results Abnormal     None          Imaging Results (all)     Procedure Component Value Units Date/Time    CT Head Without Contrast [[id number]] Collected:  09/29/19 1810     Updated:  09/30/19 0827    Narrative:       EXAMINATION: CT HEAD WO CONTRAST - 09/29/2019     INDICATION: Vertigo, nausea and vomiting.      TECHNIQUE: 5 mm unenhanced images through the brain.     The radiation dose reduction device was turned on for each scan per the  ALARA (As Low as Reasonably Achievable) protocol.     COMPARISON: 04/27/2019 head CT scan.     FINDINGS: History indicates vertigo x 2 days, nausea and vomiting. The  calvarium appears intact. Included paranasal sinuses and mastoids appear  clear except for an approximately 2.5 cm left maxillary sinus mucous  cyst. There is focal dense edema in the inferior right cerebellum and  much  milder focal edema in the inferior left cerebellum, consistent with  recent or subacute infarcts. There is probably involvement of the  inferior cerebellar vermis as well. There appear to be a couple of  punctate old lacunar infarcts in the right basal ganglia. There is no  evidence of edema/infarct elsewhere, no evidence of hemorrhage,  hydrocephalus, mass or mass effect or abnormal extra-axial collection.        Impression:       1. Inferior cerebellar edema, right greater than left, most likely  representing subacute infarcts. No evidence of hemorrhage.  2. Old lacunar infarct in the right basal ganglia.  3. No evidence of acute intracranial disease elsewhere.     DICTATED:   09/29/2019  EDITED/ls :   09/29/2019      This report was finalized on 9/30/2019 8:24 AM by DR. Valentino Lo MD.       MRI Brain Without Contrast [[id number]] Collected:  09/29/19 2144     Updated:  09/29/19 2146    Narrative:       MRI Brain WO    INDICATION:   Dizziness for the past 2 days with nausea. Evaluate for stroke.    TECHNIQUE:   MRI of the brain without IV contrast.    COMPARISON:    None available.    FINDINGS:  Diffusion-weighted images show no evidence of recent infarct. The routine brain images demonstrate encephalomalacia in the right posterior inferior cerebellar artery distribution consistent with an old infarct. There is no evidence of mass lesion  hemorrhage or edema. Generalized atrophy is seen and there are moderately severe chronic ischemic changes around the ventricles bilaterally. Extra-axial structures are unremarkable.      Impression:       Chronic infarct right inferior cerebellum. Chronic ischemic changes around the ventricles. No acute findings.    Signer Name: Ricardo Fowler MD   Signed: 9/29/2019 9:44 PM   Workstation Name: [id number]    [facility]          Results for orders placed during the hospital encounter of 09/29/19   Duplex Carotid Ultrasound CAR    Narrative · Right internal  carotid artery stenosis of 0-49%.  · Left internal carotid artery stenosis of 0-49%.          Results for orders placed during the hospital encounter of 09/29/19   Duplex Carotid Ultrasound CAR    Narrative · Right internal carotid artery stenosis of 0-49%.  · Left internal carotid artery stenosis of 0-49%.          Results for orders placed during the hospital encounter of 09/29/19   Adult Transthoracic Echo Complete W/ Cont if Necessary Per Protocol    Narrative · Estimated EF = 60%.  · Left ventricular systolic function is normal.            Discharge Details        Discharge Medications      New Medications      Instructions Start Date   aspirin 81 MG chewable tablet   81 mg, Oral, Daily      meclizine 12.5 MG tablet  Commonly known as:  ANTIVERT   12.5 mg, Oral, 3 Times Daily PRN         Changes to Medications      Instructions Start Date   atorvastatin 80 MG tablet  Commonly known as:  LIPITOR  What changed:    · medication strength  · how much to take   80 mg, Oral, Nightly      isosorbide mononitrate 60 MG 24 hr tablet  Commonly known as:  IMDUR  What changed:    · medication strength  · how much to take   60 mg, Oral, Daily         Continue These Medications      Instructions Start Date   acetaminophen 325 MG tablet  Commonly known as:  TYLENOL   650 mg, Oral, Every 6 Hours PRN      albuterol sulfate  (90 Base) MCG/ACT inhaler  Commonly known as:  PROVENTIL HFA;VENTOLIN HFA;PROAIR HFA   2 puffs, Inhalation, Every 4 Hours PRN      calcitriol 0.25 MCG capsule  Commonly known as:  ROCALTROL   0.25 mcg, Oral, Daily      carvedilol 25 MG tablet  Commonly known as:  COREG   25 mg, Oral, 2 Times Daily With Meals      diltiaZEM (CARDIZEM) 2% cream   Topical, 3 Times Daily PRN      docusate sodium 100 MG capsule  Commonly known as:  COLACE   100 mg, Oral, 2 Times Daily      fluticasone-salmeterol 500-50 MCG/DOSE DISKUS  Commonly known as:  ADVAIR   1 puff, Inhalation, 2 Times Daily - RT       HYDROcodone-acetaminophen 5-325 MG per tablet  Commonly known as:  NORCO   1 tablet, Oral, Every 8 Hours PRN      insulin aspart 100 UNIT/ML solution pen-injector sc pen  Commonly known as:  novoLOG FLEXPEN   5 Units, Subcutaneous, 3 Times Daily With Meals      ipratropium-albuterol 0.5-2.5 mg/3 ml nebulizer  Commonly known as:  DUO-NEB   3 mL, Nebulization, Every 6 Hours PRN      Lidocaine 2 % gel   Apply externally, 3 Times Daily PRN      melatonin 3 MG tablet   3 mg, Oral, Nightly      ondansetron 4 MG tablet  Commonly known as:  ZOFRAN   4 mg, Oral, Every 8 Hours PRN      pantoprazole 40 MG EC tablet  Commonly known as:  PROTONIX   40 mg, Oral, Daily      polyethylene glycol pack packet  Commonly known as:  MIRALAX   17 g, Oral, 2 Times Daily      PREPARATION H 0.25-88.44 % suppository suppository  Generic drug:  phenylephrine-cocoa Butter   Rectal, As Needed      promethazine 25 MG tablet  Commonly known as:  PHENERGAN   25 mg, Oral, 3 Times Weekly, On dialysis days (Mon Weds Fri)       saccharomyces boulardii 250 MG capsule  Commonly known as:  FLORASTOR   250 mg, Oral, Daily      sertraline 50 MG tablet  Commonly known as:  ZOLOFT   50 mg, Oral, Daily      sevelamer 800 MG tablet  Commonly known as:  RENVELA   800 mg, Oral, 3 Times Daily With Meals, 0800, 1200 & 1700      TAB-A-STAR tablet   1 tablet, Oral, Daily      vitamin D 03159 units capsule capsule  Commonly known as:  ERGOCALCIFEROL   50,000 Units, Oral, Weekly, TAKES ON WEDNESDAYS       warfarin 2.5 MG tablet  Commonly known as:  COUMADIN   2.5 mg, Oral, Daily Warfarin, Take with 4mg for a total of 6.5mg       warfarin 4 MG tablet  Commonly known as:  COUMADIN   4 mg, Oral, Daily Warfarin, Take with 2.5mg for a total of 6.5mg              Allergies   Allergen Reactions   • Geodon [Ziprasidone Hcl] Unknown (See Comments)     PT DOESN'T REMEMBER   • Haldol [Haloperidol Lactate] Unknown (See Comments)     PT DOESN'T REMEMBER   • Seroquel [Quetiapine  Fumarate] Unknown (See Comments)     PT DOESN'T REMEMBER         Discharge Disposition:  Skilled Nursing Facility (DC - External)    Diet:  Hospital:  Diet Order   Procedures   • Diet Regular; Cardiac, Consistent Carbohydrate     Discharge:     Discharge Activity:         CODE STATUS:    Code Status and Medical Interventions:   Ordered at: 09/29/19 1958     Level Of Support Discussed With:    Patient     Code Status:    No CPR     Medical Interventions (Level of Support Prior to Arrest):    Full         Future Appointments   Date Time Provider Department Center   10/2/2019  7:00 AM DIALYSIS STATION 2  [facility]       Additional Instructions for the Follow-ups that You Need to Schedule     Discharge Follow-up with PCP   As directed       Currently Documented PCP:    Goldy Dior MD    PCP Phone Number:    [phone]     Follow Up Details:  supratherapeutic INR on discharge from the hospital, will need to restart warfarin when INR appropriate               Time Spent on Discharge:  40 minutes    Electronically signed by Tri Gary MD, 10/01/19, 11:18 AM.        Electronically signed by Carlita Jackson DO at 10/02/19 5849

## 2019-10-02 NOTE — PROGRESS NOTES
Case Management Discharge Note    Final Note: Plan is back to [facility]. [facility] is scheduled for 1200. Nurse to call report to [phone]. CM will fax discharge summary. [facility] has been notified of discharge. Please send hard copies of scripts with the patient.    Destination - Selection Complete      Service Provider Request Status Selected Services Address Phone Number Fax Number    [facility] Selected Intermediate Care [address] , [place] [id number] [phone]      Durable Medical Equipment      No service has been selected for the patient.      Dialysis/Infusion - Selection Complete      Service Provider Request Status Selected Services Address Phone Number Fax Number    [facility] Selected Dialysis [address], [facility] [id number] [phone]      Home Medical Care      No service has been selected for the patient.      Therapy      No service has been selected for the patient.      Community Resources      No service has been selected for the patient.        Transportation Services  [facility]    Final Discharge Disposition Code: 04 - intermediate care facility

## 2019-10-02 NOTE — PLAN OF CARE
Problem: Hemodialysis (Adult)  Goal: Signs and Symptoms of Listed Potential Problems Will be Absent, Minimized or Managed (Hemodialysis)  Outcome: Ongoing (interventions implemented as appropriate)

## 2019-10-02 NOTE — PROGRESS NOTES
"   LOS: 3 days    Patient Care Team:  Goldy Dior MD as PCP - General (Internal Medicine)    Reason For Visit:    Subjective   Seen on dialysis, complaining of constipation        Review of Systems:    Pulm: No soa   CV:  No CP      Objective       aspirin 81 mg Oral Daily   atorvastatin 80 mg Oral Nightly   budesonide-formoterol 2 puff Inhalation BID - RT   calcitriol 0.25 mcg Oral Daily   carvedilol 25 mg Oral BID With Meals   insulin lispro 0-7 Units Subcutaneous 4x Daily With Meals & Nightly   isosorbide mononitrate 60 mg Oral Daily   melatonin 5 mg Oral Nightly   multivitamin 1 tablet Oral Daily   pantoprazole 40 mg Oral Daily   saccharomyces boulardii 250 mg Oral Daily   sennosides-docusate sodium 2 tablet Oral Nightly   sertraline 50 mg Oral Daily   sevelamer 800 mg Oral TID With Meals   sodium chloride 10 mL Intravenous Q12H            Vital Signs:  Blood pressure 132/86, pulse 89, temperature 97 °F (36.1 °C), temperature source Oral, resp. rate 18, height 162.6 cm (64.02\"), weight 130 kg (286 lb 9.6 oz), SpO2 100 %.    Flowsheet Rows      First Filed Value   Admission Height  162.6 cm (64.02\") Documented at 09/29/2019 1539   Admission Weight  130 kg (286 lb 9.6 oz) Documented at 09/29/2019 1539          10/01 0701 - 10/02 0700  In: 720 [P.O.:720]  Out: 900 [Urine:900]    Physical Exam:    General Appearance: NAD, alert and cooperative, Ox3  Eyes: PER, conjunctivae and sclerae normal, no icterus  Lungs: respirations regular and unlabored, no crepitus, clear to auscultation  Heart/CV: regular rhythm & normal rate, no murmur, no gallop, no rub trace edema  Abdomen: not distended, soft, non-tender, no masses,  obese  Skin: No rash, Warm and dry tunneled HD catheter    Radiology:      Renal Imaging:        Labs:  Results from last 7 days   Lab Units 10/02/19  0613 10/01/19  0514 09/30/19  0538   WBC 10*3/mm3 8.22 8.28 7.63   HEMOGLOBIN g/dL 11.0* 11.0* 11.0*   HEMATOCRIT % 38.3 38.3 38.0   PLATELETS " 10*3/mm3 230 238 261     Results from last 7 days   Lab Units 10/02/19  0613 10/01/19  0514 09/30/19  0538 09/29/19  1647   SODIUM mmol/L 138 138 139 137   POTASSIUM mmol/L 4.9 4.5 4.5 4.8   CHLORIDE mmol/L 103 103 102 101   CO2 mmol/L 23.0 22.0 22.0 22.0   BUN mg/dL 58* 43* 64* 62*   CREATININE mg/dL 4.56* 3.74* 4.91* 4.33*   CALCIUM mg/dL 8.6 8.5* 8.9 8.5*   MAGNESIUM mg/dL  --   --  1.7  --    ALBUMIN g/dL 2.90* 2.80*  --  3.30*     Results from last 7 days   Lab Units 10/02/19  0613   GLUCOSE mg/dL 207*       Results from last 7 days   Lab Units 10/02/19  0613   ALK PHOS U/L 102   BILIRUBIN mg/dL 0.6   ALT (SGPT) U/L 5   AST (SGOT) U/L 8                 Estimated Creatinine Clearance: 15.8 mL/min (A) (by C-G formula based on SCr of 4.56 mg/dL (H)).      Assessment       Cerebellar infarct (CMS/[facility])    Essential hypertension    Morbid obesity (CMS/[facility])    Anxiety    Type 2 diabetes mellitus with chronic kidney disease on chronic dialysis, with long-term current use of insulin (CMS/[facility])    ESRD (end stage renal disease) (CMS/[facility])    Chronic diastolic heart failure (CMS/[facility])    Abscess of back s/p drainage as outpatient 8/22    History of deep vein thrombosis (DVT) of brachial vein of left upper extremity (CMS/[facility])    Anticoagulated on Coumadin    Supratherapeutic INR    Hemodialysis patient (CMS/[facility])    Excoriation of buttock    Dizziness            Impression:     1- ESRD - MWF   2- HTn - running low today  3- Anemia of chronic disease. At target  4- CKD MBD        Recommendations:   HD now   Renally dose meds  UF as tolerated    Lila Casillas DO  10/02/19  8:55 AM

## 2019-10-02 NOTE — PLAN OF CARE
Problem: Patient Care Overview  Goal: Plan of Care Review  Outcome: Ongoing (interventions implemented as appropriate)   10/02/19 0615   Coping/Psychosocial   Plan of Care Reviewed With patient   Plan of Care Review   Progress improving     Goal: Individualization and Mutuality  Outcome: Ongoing (interventions implemented as appropriate)    Goal: Discharge Needs Assessment  Outcome: Ongoing (interventions implemented as appropriate)      Problem: Fall Risk (Adult)  Goal: Identify Related Risk Factors and Signs and Symptoms  Outcome: Ongoing (interventions implemented as appropriate)    Goal: Absence of Fall  Outcome: Ongoing (interventions implemented as appropriate)      Problem: Skin Injury Risk (Adult)  Goal: Identify Related Risk Factors and Signs and Symptoms  Outcome: Ongoing (interventions implemented as appropriate)    Goal: Skin Health and Integrity  Outcome: Ongoing (interventions implemented as appropriate)

## 2019-10-14 ENCOUNTER — APPOINTMENT (OUTPATIENT)
Dept: GENERAL RADIOLOGY | Facility: HOSPITAL | Age: 69
End: 2019-10-14

## 2019-10-14 ENCOUNTER — HOSPITAL ENCOUNTER (EMERGENCY)
Facility: HOSPITAL | Age: 69
Discharge: HOME | End: 2019-10-15
Attending: EMERGENCY MEDICINE | Admitting: EMERGENCY MEDICINE

## 2019-10-14 ENCOUNTER — APPOINTMENT (OUTPATIENT)
Dept: CT IMAGING | Facility: HOSPITAL | Age: 69
End: 2019-10-14

## 2019-10-14 DIAGNOSIS — R42 DIZZINESS: ICD-10-CM

## 2019-10-14 DIAGNOSIS — H53.9 VISION DISTURBANCE: ICD-10-CM

## 2019-10-14 DIAGNOSIS — R53.1 GENERALIZED WEAKNESS: Primary | ICD-10-CM

## 2019-10-14 LAB
ALBUMIN SERPL-MCNC: 3.4 G/DL (ref 3.5–5.2)
ALBUMIN/GLOB SERPL: 0.8 G/DL
ALP SERPL-CCNC: 137 U/L (ref 39–117)
ALT SERPL W P-5'-P-CCNC: 7 U/L (ref 1–33)
ANION GAP SERPL CALCULATED.3IONS-SCNC: 10 MMOL/L (ref 5–15)
AST SERPL-CCNC: 10 U/L (ref 1–32)
BACTERIA UR QL AUTO: ABNORMAL /HPF
BASOPHILS # BLD AUTO: 0.05 10*3/MM3 (ref 0–0.2)
BASOPHILS NFR BLD AUTO: 0.4 % (ref 0–1.5)
BILIRUB SERPL-MCNC: 0.3 MG/DL (ref 0.2–1.2)
BILIRUB UR QL STRIP: NEGATIVE
BUN BLD-MCNC: 33 MG/DL (ref 8–23)
BUN/CREAT SERPL: 11.3 (ref 7–25)
CALCIUM SPEC-SCNC: 8.2 MG/DL (ref 8.6–10.5)
CHLORIDE SERPL-SCNC: 97 MMOL/L (ref 98–107)
CLARITY UR: ABNORMAL
CO2 SERPL-SCNC: 24 MMOL/L (ref 22–29)
COLOR UR: YELLOW
CREAT BLD-MCNC: 2.92 MG/DL (ref 0.57–1)
DEPRECATED RDW RBC AUTO: 59.7 FL (ref 37–54)
EOSINOPHIL # BLD AUTO: 0.35 10*3/MM3 (ref 0–0.4)
EOSINOPHIL NFR BLD AUTO: 2.5 % (ref 0.3–6.2)
ERYTHROCYTE [DISTWIDTH] IN BLOOD BY AUTOMATED COUNT: 16.9 % (ref 12.3–15.4)
GFR SERPL CREATININE-BSD FRML MDRD: 19 ML/MIN/1.73
GLOBULIN UR ELPH-MCNC: 4.1 GM/DL
GLUCOSE BLD-MCNC: 329 MG/DL (ref 65–99)
GLUCOSE UR STRIP-MCNC: ABNORMAL MG/DL
HCT VFR BLD AUTO: 36.7 % (ref 34–46.6)
HGB BLD-MCNC: 10.9 G/DL (ref 12–15.9)
HGB UR QL STRIP.AUTO: ABNORMAL
HOLD SPECIMEN: NORMAL
HOLD SPECIMEN: NORMAL
HYALINE CASTS UR QL AUTO: ABNORMAL /LPF
IMM GRANULOCYTES # BLD AUTO: 0.2 10*3/MM3 (ref 0–0.05)
IMM GRANULOCYTES NFR BLD AUTO: 1.4 % (ref 0–0.5)
INR PPP: 2.88 (ref 0.85–1.16)
KETONES UR QL STRIP: ABNORMAL
LEUKOCYTE ESTERASE UR QL STRIP.AUTO: ABNORMAL
LYMPHOCYTES # BLD AUTO: 2.01 10*3/MM3 (ref 0.7–3.1)
LYMPHOCYTES NFR BLD AUTO: 14.2 % (ref 19.6–45.3)
MAGNESIUM SERPL-MCNC: 1.4 MG/DL (ref 1.6–2.4)
MCH RBC QN AUTO: 28.8 PG (ref 26.6–33)
MCHC RBC AUTO-ENTMCNC: 29.7 G/DL (ref 31.5–35.7)
MCV RBC AUTO: 97.1 FL (ref 79–97)
MONOCYTES # BLD AUTO: 0.76 10*3/MM3 (ref 0.1–0.9)
MONOCYTES NFR BLD AUTO: 5.4 % (ref 5–12)
NEUTROPHILS # BLD AUTO: 10.74 10*3/MM3 (ref 1.7–7)
NEUTROPHILS NFR BLD AUTO: 76.1 % (ref 42.7–76)
NITRITE UR QL STRIP: NEGATIVE
NRBC BLD AUTO-RTO: 0.4 /100 WBC (ref 0–0.2)
PH UR STRIP.AUTO: <=5 [PH] (ref 5–8)
PLATELET # BLD AUTO: 265 10*3/MM3 (ref 140–450)
PMV BLD AUTO: 11 FL (ref 6–12)
POTASSIUM BLD-SCNC: 4.6 MMOL/L (ref 3.5–5.2)
PROT SERPL-MCNC: 7.5 G/DL (ref 6–8.5)
PROT UR QL STRIP: ABNORMAL
PROTHROMBIN TIME: 29 SECONDS (ref 11.2–14.3)
RBC # BLD AUTO: 3.78 10*6/MM3 (ref 3.77–5.28)
RBC # UR: ABNORMAL /HPF
REF LAB TEST METHOD: ABNORMAL
SODIUM BLD-SCNC: 131 MMOL/L (ref 136–145)
SP GR UR STRIP: 1.02 (ref 1–1.03)
SQUAMOUS #/AREA URNS HPF: ABNORMAL /HPF
TROPONIN T SERPL-MCNC: 0.14 NG/ML (ref 0–0.03)
TROPONIN T SERPL-MCNC: 0.15 NG/ML (ref 0–0.03)
UROBILINOGEN UR QL STRIP: ABNORMAL
WBC NRBC COR # BLD: 14.11 10*3/MM3 (ref 3.4–10.8)
WBC UR QL AUTO: ABNORMAL /HPF
WHOLE BLOOD HOLD SPECIMEN: NORMAL
WHOLE BLOOD HOLD SPECIMEN: NORMAL

## 2019-10-14 PROCEDURE — 25010000002 DIMENHYDRINATE 50 MG/ML SOLUTION: Performed by: EMERGENCY MEDICINE

## 2019-10-14 PROCEDURE — 99284 EMERGENCY DEPT VISIT MOD MDM: CPT

## 2019-10-14 PROCEDURE — 71045 X-RAY EXAM CHEST 1 VIEW: CPT

## 2019-10-14 PROCEDURE — 85025 COMPLETE CBC W/AUTO DIFF WBC: CPT | Performed by: EMERGENCY MEDICINE

## 2019-10-14 PROCEDURE — 83735 ASSAY OF MAGNESIUM: CPT | Performed by: EMERGENCY MEDICINE

## 2019-10-14 PROCEDURE — 93005 ELECTROCARDIOGRAM TRACING: CPT

## 2019-10-14 PROCEDURE — 85610 PROTHROMBIN TIME: CPT | Performed by: EMERGENCY MEDICINE

## 2019-10-14 PROCEDURE — 70450 CT HEAD/BRAIN W/O DYE: CPT

## 2019-10-14 PROCEDURE — 80053 COMPREHEN METABOLIC PANEL: CPT | Performed by: EMERGENCY MEDICINE

## 2019-10-14 PROCEDURE — 96374 THER/PROPH/DIAG INJ IV PUSH: CPT

## 2019-10-14 PROCEDURE — 84484 ASSAY OF TROPONIN QUANT: CPT | Performed by: EMERGENCY MEDICINE

## 2019-10-14 PROCEDURE — 81001 URINALYSIS AUTO W/SCOPE: CPT | Performed by: EMERGENCY MEDICINE

## 2019-10-14 PROCEDURE — 87086 URINE CULTURE/COLONY COUNT: CPT | Performed by: EMERGENCY MEDICINE

## 2019-10-14 PROCEDURE — 93005 ELECTROCARDIOGRAM TRACING: CPT | Performed by: EMERGENCY MEDICINE

## 2019-10-14 RX ORDER — FLUTICASONE PROPIONATE 50 MCG
2 SPRAY, SUSPENSION (ML) NASAL DAILY
COMMUNITY
End: 2021-02-17 | Stop reason: HOSPADM

## 2019-10-14 RX ORDER — SODIUM CHLORIDE 0.9 % (FLUSH) 0.9 %
10 SYRINGE (ML) INJECTION AS NEEDED
Status: DISCONTINUED | OUTPATIENT
Start: 2019-10-14 | End: 2019-10-15 | Stop reason: HOSPADM

## 2019-10-14 RX ORDER — DIMENHYDRINATE 50 MG/ML
50 INJECTION, SOLUTION INTRAMUSCULAR; INTRAVENOUS ONCE
Status: COMPLETED | OUTPATIENT
Start: 2019-10-14 | End: 2019-10-14

## 2019-10-14 RX ADMIN — DIMENHYDRINATE 50 MG: 50 INJECTION, SOLUTION INTRAMUSCULAR; INTRAVENOUS at 19:27

## 2019-10-14 NOTE — ED PROVIDER NOTES
"Nahun Bueno is a 69 y.o female who presents to the ED with complaints of dizziness. She reports she began experiencing dizziness with an onset 3 weeks ago. She was seen in the ED on 09/29/2019 for similar symptoms and was diagnosed with a cerebellar infarct. She states her symptoms never fully alleviated after being discharged. She also complains of headache and a visual disturbance stating she began seeing \"black spots\" yesterday. She denies numbness, weakness, fever, nausea, vomiting, diarrhea, and speech difficulty. She has been compliant while on dialysis. She has not had any recent changes in medication. She has a past medical history of anemia, asthma, chronic diastolic heart failure, CKD, diabetes mellitus, hypertension, metabolic encephalopathy, and osteoarthritis. There are no other acute symptoms at this time.        History provided by:  Patient  Dizziness   Quality:  Vertigo  Severity:  Moderate  Onset quality:  Sudden  Duration:  3 weeks  Timing:  Constant  Progression:  Unchanged  Associated symptoms: headaches    Associated symptoms: no diarrhea, no nausea, no vomiting and no weakness        Review of Systems   Constitutional: Negative for fever.   Eyes: Positive for visual disturbance.   Gastrointestinal: Negative for diarrhea, nausea and vomiting.   Neurological: Positive for dizziness and headaches. Negative for speech difficulty, weakness and numbness.   All other systems reviewed and are negative.      Past Medical History:   Diagnosis Date   • Acute on chronic diastolic heart failure (CMS/HCC)    • Acute on chronic heart failure (CMS/HCC)    • Acute respiratory failure with hypercapnia (CMS/HCC) 4/27/2019   • Anemia    • Anxiety    • Asthma    • Bilateral leg pain 10/4/2017   • Chest wall abscess 4/5/2019   • Chronic diastolic heart failure (CMS/HCC)    • Chronic kidney disease    • Diabetes mellitus (CMS/HCC)    • Diabetes mellitus, type II (CMS/HCC) 8/3/2018   • Elevated " troponin 2019   • Encephalopathy, metabolic 2019   • Generalized weakness 2019   • History of Clostridium difficile infection 8/3/2018   • History of respiratory failure, since off home oxygen 8/3/2018   • History of UTI 8/3/2018   • Hyperkalemia 2019   • Hypertension    • Morbid obesity (CMS/HCC)    • Osteoarthritis    • Sleep apnea    • Slow transit constipation 2019   • Tinea capitis 8/3/2018   • URI (upper respiratory infection) 2019   • Urinary tract infection due to extended-spectrum beta lactamase (ESBL)-producing Klebsiella 2019   • Urinary tract infection without hematuria 2019   • UTI (urinary tract infection), bacterial 10/4/2017   • Volume overload 2019       Allergies   Allergen Reactions   • Geodon [Ziprasidone Hcl] Unknown (See Comments)     PT DOESN'T REMEMBER   • Haldol [Haloperidol Lactate] Unknown (See Comments)     PT DOESN'T REMEMBER   • Seroquel [Quetiapine Fumarate] Unknown (See Comments)     PT DOESN'T REMEMBER       Past Surgical History:   Procedure Laterality Date   • ARM LESION/CYST EXCISION N/A 2019    Procedure: ABSCESS DRAINAGE X2 ON BACK;  Surgeon: Carlos Enrique Calderón MD;  Location:  [facility];  Service: General   • ARTERIOVENOUS FISTULA/SHUNT SURGERY Left 10/22/2018    Procedure: LEFT UPPER EXTREMITY ARTERIOVENOUS FISTULA CREATION;  Surgeon: Carlos Enrique Calderón MD;  Location:  [facility];  Service: Vascular   •  SECTION     • COLONOSCOPY N/A 2017    Procedure: COLONOSCOPY;  Surgeon: Mark I Brunner, MD;  Location:  [facility];  Service:    • ENDOSCOPY N/A 2017    Procedure: ESOPHAGOGASTRODUODENOSCOPY ;  Surgeon: Velasquez Call MD;  Location:  [facility];  Service:    • HERNIA REPAIR     • INSERTION HEMODIALYSIS CATHETER Right 10/17/2018    Procedure: HEMODIALYSIS CATHETER INSERTION;  Surgeon: Carlos Enrique Calderón MD;  Location:  [facility];  Service: General       Family History   Problem Relation Age of  Onset   • Cardiomyopathy Mother    • Stroke Father    • Diabetes Father        Social History     Socioeconomic History   • Marital status:      Spouse name: Not on file   • Number of children: Not on file   • Years of education: Not on file   • Highest education level: Not on file   Tobacco Use   • Smoking status: Former Smoker     Packs/day: 0.25   • Smokeless tobacco: Never Used   • Tobacco comment: unknown when quit, maybe last year   Substance and Sexual Activity   • Alcohol use: No   • Drug use: No   • Sexual activity: No   Social History Narrative    Mrs. Bueno is a 68 year old AA  female. She lives alone in [place] but has been in rehab for some time. She has a daughter. She does not have an advanced directive.         Objective   Physical Exam   Constitutional: She is oriented to person, place, and time. She appears well-developed and well-nourished. No distress.   Obese.   HENT:   Head: Normocephalic and atraumatic.   Nose: Nose normal.   Eyes: Conjunctivae are normal. No scleral icterus.   Neck: Normal range of motion. Neck supple.   Cardiovascular: Normal rate and regular rhythm.   Murmur heard.   Systolic murmur is present with a grade of 2/6.  Pulmonary/Chest: Effort normal and breath sounds normal. No respiratory distress.   Abdominal: Soft. There is no tenderness.   Musculoskeletal: Normal range of motion. She exhibits no edema.   Neurological: She is alert and oriented to person, place, and time.   Skin: Skin is warm and dry.   Psychiatric: She has a normal mood and affect. Her behavior is normal.   Nursing note and vitals reviewed.      Procedures         ED Course     Reviewed recent visit notes. Neuro felt her cerebellar strokes were chronic.  No acute process was found. Today CT negative, UA negative (will culture) and labs c/w ESRD.  Better in ED.  Patient stable on serial rechecks.  For the eye I suspect diabetic sequelae and will refer.  Discussed findings, concerns, plan  of care, expected course, reasons to return and followup.                  MDM  Number of Diagnoses or Management Options  Dizziness:   Generalized weakness:   Vision disturbance:      Amount and/or Complexity of Data Reviewed  Clinical lab tests: reviewed and ordered  Tests in the radiology section of CPT®: reviewed and ordered  Decide to obtain previous medical records or to obtain history from someone other than the patient: yes  Review and summarize past medical records: yes  Independent visualization of images, tracings, or specimens: yes        Final diagnoses:   Generalized weakness   Dizziness   Vision disturbance       Documentation assistance provided by abelardo Paz.  Information recorded by the scribe was done at my direction and has been verified and validated by me.     Jonathon Paz  10/14/19 1858       Jonathon Paz  10/14/19 1924       Car Sharpe MD  10/14/19 2124

## 2019-10-15 VITALS
BODY MASS INDEX: 49.34 KG/M2 | OXYGEN SATURATION: 96 % | RESPIRATION RATE: 24 BRPM | HEART RATE: 78 BPM | DIASTOLIC BLOOD PRESSURE: 65 MMHG | SYSTOLIC BLOOD PRESSURE: 135 MMHG | HEIGHT: 64 IN | TEMPERATURE: 99 F | WEIGHT: 289 LBS

## 2019-10-15 LAB — BACTERIA SPEC AEROBE CULT: NO GROWTH

## 2019-11-07 ENCOUNTER — APPOINTMENT (OUTPATIENT)
Dept: PREADMISSION TESTING | Facility: HOSPITAL | Age: 69
End: 2019-11-07

## 2019-11-07 VITALS — HEIGHT: 64 IN | BODY MASS INDEX: 50.02 KG/M2 | WEIGHT: 293 LBS

## 2019-11-07 LAB
ALBUMIN SERPL-MCNC: 4.1 G/DL (ref 3.5–5.2)
ALBUMIN/GLOB SERPL: 1 G/DL
ALP SERPL-CCNC: 133 U/L (ref 39–117)
ALT SERPL W P-5'-P-CCNC: 8 U/L (ref 1–33)
ANION GAP SERPL CALCULATED.3IONS-SCNC: 17 MMOL/L (ref 5–15)
AST SERPL-CCNC: 7 U/L (ref 1–32)
BILIRUB SERPL-MCNC: 0.4 MG/DL (ref 0.2–1.2)
BUN BLD-MCNC: 75 MG/DL (ref 8–23)
BUN/CREAT SERPL: 13.7 (ref 7–25)
CALCIUM SPEC-SCNC: 8.5 MG/DL (ref 8.6–10.5)
CHLORIDE SERPL-SCNC: 101 MMOL/L (ref 98–107)
CO2 SERPL-SCNC: 19 MMOL/L (ref 22–29)
CREAT BLD-MCNC: 5.49 MG/DL (ref 0.57–1)
DEPRECATED RDW RBC AUTO: 63 FL (ref 37–54)
ERYTHROCYTE [DISTWIDTH] IN BLOOD BY AUTOMATED COUNT: 18 % (ref 12.3–15.4)
GFR SERPL CREATININE-BSD FRML MDRD: 9 ML/MIN/1.73
GFR SERPL CREATININE-BSD FRML MDRD: ABNORMAL ML/MIN/{1.73_M2}
GLOBULIN UR ELPH-MCNC: 4.3 GM/DL
GLUCOSE BLD-MCNC: 384 MG/DL (ref 65–99)
HBA1C MFR BLD: 9.8 % (ref 4.8–5.6)
HCT VFR BLD AUTO: 41.3 % (ref 34–46.6)
HGB BLD-MCNC: 11.9 G/DL (ref 12–15.9)
INR PPP: 2.93 (ref 0.85–1.16)
MCH RBC QN AUTO: 27.7 PG (ref 26.6–33)
MCHC RBC AUTO-ENTMCNC: 28.8 G/DL (ref 31.5–35.7)
MCV RBC AUTO: 96 FL (ref 79–97)
PLATELET # BLD AUTO: 256 10*3/MM3 (ref 140–450)
PMV BLD AUTO: 10.9 FL (ref 6–12)
POTASSIUM BLD-SCNC: 5.4 MMOL/L (ref 3.5–5.2)
PROT SERPL-MCNC: 8.4 G/DL (ref 6–8.5)
PROTHROMBIN TIME: 29.4 SECONDS (ref 11.2–14.3)
RBC # BLD AUTO: 4.3 10*6/MM3 (ref 3.77–5.28)
SODIUM BLD-SCNC: 137 MMOL/L (ref 136–145)
WBC NRBC COR # BLD: 8.2 10*3/MM3 (ref 3.4–10.8)

## 2019-11-07 PROCEDURE — 93010 ELECTROCARDIOGRAM REPORT: CPT | Performed by: INTERNAL MEDICINE

## 2019-11-07 PROCEDURE — 36415 COLL VENOUS BLD VENIPUNCTURE: CPT

## 2019-11-07 PROCEDURE — 80053 COMPREHEN METABOLIC PANEL: CPT | Performed by: SURGERY

## 2019-11-07 PROCEDURE — 85610 PROTHROMBIN TIME: CPT | Performed by: SURGERY

## 2019-11-07 PROCEDURE — 83036 HEMOGLOBIN GLYCOSYLATED A1C: CPT | Performed by: SURGERY

## 2019-11-07 PROCEDURE — 93005 ELECTROCARDIOGRAM TRACING: CPT

## 2019-11-07 PROCEDURE — 85027 COMPLETE CBC AUTOMATED: CPT | Performed by: SURGERY

## 2019-11-07 RX ORDER — ISOSORBIDE MONONITRATE 30 MG/1
30 TABLET, EXTENDED RELEASE ORAL DAILY
COMMUNITY
End: 2021-02-17 | Stop reason: HOSPADM

## 2019-11-07 RX ORDER — WARFARIN SODIUM 6 MG/1
6 TABLET ORAL
Status: ON HOLD | COMMUNITY
End: 2020-07-06 | Stop reason: SDUPTHER

## 2019-11-07 NOTE — PAT
ekg cleared by dr nance.       bactroban given to patient and instructions left for [facility] (nursing home pt resides) to use nose ointment night before surgery.   Pt and aid (with pt) verbalize understanding.       Dr tucker office notified since pt wasn't told when to stop her coumadin- edgard states 3 days to be off. Pt informed and message left for [facility] staff also.

## 2019-11-10 ENCOUNTER — APPOINTMENT (OUTPATIENT)
Dept: GENERAL RADIOLOGY | Facility: HOSPITAL | Age: 69
End: 2019-11-10

## 2019-11-10 ENCOUNTER — HOSPITAL ENCOUNTER (EMERGENCY)
Facility: HOSPITAL | Age: 69
Discharge: HOME | End: 2019-11-10
Attending: EMERGENCY MEDICINE | Admitting: EMERGENCY MEDICINE

## 2019-11-10 ENCOUNTER — HOSPITAL ENCOUNTER (EMERGENCY)
Facility: HOSPITAL | Age: 69
Discharge: NURSING, REHAB OR LONG-TERM CARE | End: 2019-11-10
Attending: EMERGENCY MEDICINE | Admitting: EMERGENCY MEDICINE

## 2019-11-10 VITALS
BODY MASS INDEX: 50.02 KG/M2 | RESPIRATION RATE: 18 BRPM | TEMPERATURE: 98.4 F | HEIGHT: 64 IN | WEIGHT: 293 LBS | HEART RATE: 80 BPM | OXYGEN SATURATION: 100 % | DIASTOLIC BLOOD PRESSURE: 109 MMHG | SYSTOLIC BLOOD PRESSURE: 200 MMHG

## 2019-11-10 VITALS
WEIGHT: 293 LBS | RESPIRATION RATE: 16 BRPM | HEART RATE: 70 BPM | DIASTOLIC BLOOD PRESSURE: 84 MMHG | TEMPERATURE: 97.8 F | OXYGEN SATURATION: 96 % | HEIGHT: 64 IN | BODY MASS INDEX: 50.02 KG/M2 | SYSTOLIC BLOOD PRESSURE: 169 MMHG

## 2019-11-10 DIAGNOSIS — I10 ESSENTIAL HYPERTENSION: Primary | ICD-10-CM

## 2019-11-10 DIAGNOSIS — K62.89 RECTAL PAIN: Primary | ICD-10-CM

## 2019-11-10 DIAGNOSIS — K56.41 FECAL IMPACTION (HCC): ICD-10-CM

## 2019-11-10 DIAGNOSIS — R06.02 CHRONIC SHORTNESS OF BREATH: ICD-10-CM

## 2019-11-10 LAB
ALBUMIN SERPL-MCNC: 3 G/DL (ref 3.5–5.2)
ALBUMIN/GLOB SERPL: 0.6 G/DL
ALP SERPL-CCNC: 140 U/L (ref 39–117)
ALT SERPL W P-5'-P-CCNC: 8 U/L (ref 1–33)
ANION GAP SERPL CALCULATED.3IONS-SCNC: 15 MMOL/L (ref 5–15)
AST SERPL-CCNC: 10 U/L (ref 1–32)
BASOPHILS # BLD AUTO: 0.03 10*3/MM3 (ref 0–0.2)
BASOPHILS NFR BLD AUTO: 0.5 % (ref 0–1.5)
BILIRUB SERPL-MCNC: 0.3 MG/DL (ref 0.2–1.2)
BUN BLD-MCNC: 70 MG/DL (ref 8–23)
BUN/CREAT SERPL: 13.3 (ref 7–25)
CALCIUM SPEC-SCNC: 8.5 MG/DL (ref 8.6–10.5)
CHLORIDE SERPL-SCNC: 102 MMOL/L (ref 98–107)
CO2 SERPL-SCNC: 20 MMOL/L (ref 22–29)
CREAT BLD-MCNC: 5.28 MG/DL (ref 0.57–1)
DEPRECATED RDW RBC AUTO: 62.5 FL (ref 37–54)
EOSINOPHIL # BLD AUTO: 0.41 10*3/MM3 (ref 0–0.4)
EOSINOPHIL NFR BLD AUTO: 6.5 % (ref 0.3–6.2)
ERYTHROCYTE [DISTWIDTH] IN BLOOD BY AUTOMATED COUNT: 17.6 % (ref 12.3–15.4)
GFR SERPL CREATININE-BSD FRML MDRD: 10 ML/MIN/1.73
GFR SERPL CREATININE-BSD FRML MDRD: ABNORMAL ML/MIN/{1.73_M2}
GLOBULIN UR ELPH-MCNC: 4.7 GM/DL
GLUCOSE BLD-MCNC: 305 MG/DL (ref 65–99)
HCT VFR BLD AUTO: 39.7 % (ref 34–46.6)
HGB BLD-MCNC: 11.7 G/DL (ref 12–15.9)
HOLD SPECIMEN: NORMAL
HOLD SPECIMEN: NORMAL
IMM GRANULOCYTES # BLD AUTO: 0.03 10*3/MM3 (ref 0–0.05)
IMM GRANULOCYTES NFR BLD AUTO: 0.5 % (ref 0–0.5)
LYMPHOCYTES # BLD AUTO: 1.28 10*3/MM3 (ref 0.7–3.1)
LYMPHOCYTES NFR BLD AUTO: 20.4 % (ref 19.6–45.3)
MCH RBC QN AUTO: 28.3 PG (ref 26.6–33)
MCHC RBC AUTO-ENTMCNC: 29.5 G/DL (ref 31.5–35.7)
MCV RBC AUTO: 96.1 FL (ref 79–97)
MONOCYTES # BLD AUTO: 0.53 10*3/MM3 (ref 0.1–0.9)
MONOCYTES NFR BLD AUTO: 8.4 % (ref 5–12)
NEUTROPHILS # BLD AUTO: 4 10*3/MM3 (ref 1.7–7)
NEUTROPHILS NFR BLD AUTO: 63.7 % (ref 42.7–76)
NRBC BLD AUTO-RTO: 0 /100 WBC (ref 0–0.2)
NT-PROBNP SERPL-MCNC: 3462 PG/ML (ref 5–900)
PLATELET # BLD AUTO: 227 10*3/MM3 (ref 140–450)
PMV BLD AUTO: 11.2 FL (ref 6–12)
POTASSIUM BLD-SCNC: 5.2 MMOL/L (ref 3.5–5.2)
PROT SERPL-MCNC: 7.7 G/DL (ref 6–8.5)
RBC # BLD AUTO: 4.13 10*6/MM3 (ref 3.77–5.28)
SODIUM BLD-SCNC: 137 MMOL/L (ref 136–145)
TROPONIN T SERPL-MCNC: 0.15 NG/ML (ref 0–0.03)
WBC NRBC COR # BLD: 6.28 10*3/MM3 (ref 3.4–10.8)
WHOLE BLOOD HOLD SPECIMEN: NORMAL
WHOLE BLOOD HOLD SPECIMEN: NORMAL

## 2019-11-10 PROCEDURE — 99284 EMERGENCY DEPT VISIT MOD MDM: CPT

## 2019-11-10 PROCEDURE — 83880 ASSAY OF NATRIURETIC PEPTIDE: CPT | Performed by: EMERGENCY MEDICINE

## 2019-11-10 PROCEDURE — 80053 COMPREHEN METABOLIC PANEL: CPT | Performed by: EMERGENCY MEDICINE

## 2019-11-10 PROCEDURE — 93005 ELECTROCARDIOGRAM TRACING: CPT | Performed by: EMERGENCY MEDICINE

## 2019-11-10 PROCEDURE — 71045 X-RAY EXAM CHEST 1 VIEW: CPT

## 2019-11-10 PROCEDURE — 84484 ASSAY OF TROPONIN QUANT: CPT | Performed by: EMERGENCY MEDICINE

## 2019-11-10 PROCEDURE — 85025 COMPLETE CBC W/AUTO DIFF WBC: CPT | Performed by: EMERGENCY MEDICINE

## 2019-11-10 RX ORDER — SODIUM CHLORIDE 0.9 % (FLUSH) 0.9 %
10 SYRINGE (ML) INJECTION AS NEEDED
Status: DISCONTINUED | OUTPATIENT
Start: 2019-11-10 | End: 2019-11-10 | Stop reason: HOSPADM

## 2019-11-10 NOTE — ED PROVIDER NOTES
Subjective   Ms. Joy Bueno is a 69 y.o. female who presents to the ED from [facility] with complaints of rectal pain. She reports that she has had hemorrhoid pain and bleeding for the past 2-3 week, which has become worse in the past week and prompted presentation to the ED. She also complains of intermittent coughing, shortness of breath, and sneezing for the past few weeks. She denies having a fever or chills. She has a history of HTN, DM, 2 boil removals from her back in August, and CKD on dialysis with her last dialysis being 2 days ago. However, nurse from [facility] indicates that Ms. Bueno is non-compliant with dialysis. No other acute complaints at this time.        History provided by:  Patient and nursing home  Rectal Pain   Location:  Rectal pain  Severity:  Moderate  Timing:  Constant  Progression:  Worsening  Associated symptoms: cough and shortness of breath    Associated symptoms: no abdominal pain, no chest pain, no fever, no headaches and no rash        Review of Systems   Constitutional: Negative for chills and fever.   HENT: Positive for sneezing.    Respiratory: Positive for cough and shortness of breath.    Cardiovascular: Negative for chest pain.   Gastrointestinal: Positive for constipation and rectal pain. Negative for abdominal pain.   Genitourinary: Negative for dysuria.   Musculoskeletal: Negative for back pain.   Skin: Negative for rash.   Allergic/Immunologic: Negative for immunocompromised state.   Neurological: Negative for headaches.   Hematological: Negative.    Psychiatric/Behavioral: Negative.    All other systems reviewed and are negative.      Past Medical History:   Diagnosis Date   • Acute on chronic diastolic heart failure (CMS/HCC)    • Acute on chronic heart failure (CMS/HCC)    • Acute respiratory failure with hypercapnia (CMS/HCC) 4/27/2019   • Anemia    • Anxiety    • Asthma    • Bilateral leg pain 10/4/2017   • Boil     on head    • Chest wall abscess  2019   • Chronic diastolic heart failure (CMS/HCC)    • Chronic kidney disease     stage 4   • Diabetes mellitus (CMS/HCC)     checks sugar once per day    • Diabetes mellitus, type II (CMS/HCC) 8/3/2018   • Dialysis patient (CMS/HCC)     mwf   • Dizzy    • Elevated troponin 2019   • Encephalopathy, metabolic 2019   • Generalized weakness 2019   • GERD (gastroesophageal reflux disease)    • Headache    • History of Clostridium difficile infection 2018    h/o   • History of respiratory failure, since off home oxygen 8/3/2018   • History of UTI 8/3/2018   • Hyperkalemia 2019   • Hypertension    • Morbid obesity (CMS/HCC)    • Osteoarthritis    • Sleep apnea     doesnt use machine     • Slow transit constipation 2019   • Tinea capitis 8/3/2018   • URI (upper respiratory infection) 2019   • Urinary tract infection due to extended-spectrum beta lactamase (ESBL)-producing Klebsiella 2019   • Urinary tract infection without hematuria 2019   • UTI (urinary tract infection), bacterial 10/4/2017   • Vertigo    • Volume overload 2019       Allergies   Allergen Reactions   • Geodon [Ziprasidone Hcl] Unknown (See Comments)     PT DOESN'T REMEMBER   • Haldol [Haloperidol Lactate] Unknown (See Comments)     PT DOESN'T REMEMBER   • Seroquel [Quetiapine Fumarate] Unknown (See Comments)     PT DOESN'T REMEMBER       Past Surgical History:   Procedure Laterality Date   • ARM LESION/CYST EXCISION N/A 2019    Procedure: ABSCESS DRAINAGE X2 ON BACK;  Surgeon: Carlos Enrique Calderón MD;  Location:  [facility];  Service: General   • ARTERIOVENOUS FISTULA/SHUNT SURGERY Left 10/22/2018    Procedure: LEFT UPPER EXTREMITY ARTERIOVENOUS FISTULA CREATION;  Surgeon: Carlos Enrique Calderón MD;  Location:  [facility];  Service: Vascular   • CATARACT EXTRACTION      bilat    •  SECTION     • COLONOSCOPY N/A 2017    Procedure: COLONOSCOPY;  Surgeon: Mark I Brunner, MD;  Location:   [facility];  Service:    • ENDOSCOPY N/A 2017    Procedure: ESOPHAGOGASTRODUODENOSCOPY ;  Surgeon: Velasquez Call MD;  Location:  [facility];  Service:    • HERNIA REPAIR      umbilical hernia unsure about mesh    • INSERTION HEMODIALYSIS CATHETER Right 10/17/2018    Procedure: HEMODIALYSIS CATHETER INSERTION;  Surgeon: Carlos Enrique Calderón MD;  Location:  [facility];  Service: General   • TONSILLECTOMY         Family History   Problem Relation Age of Onset   • Cardiomyopathy Mother    • Stroke Father    • Diabetes Father        Social History     Socioeconomic History   • Marital status:      Spouse name: Not on file   • Number of children: Not on file   • Years of education: Not on file   • Highest education level: Not on file   Tobacco Use   • Smoking status: Former Smoker     Packs/day: 0.05     Types: Cigarettes     Last attempt to quit:      Years since quittin.8   • Smokeless tobacco: Never Used   • Tobacco comment: smoked since high school but didnt smoke much and unsure when quit fully    Substance and Sexual Activity   • Alcohol use: No   • Drug use: No   • Sexual activity: Defer   Social History Narrative    Mrs. Bueno is a 68 year old AA  female. She lives alone in [place] but has been in rehab for some time. She has a daughter. She does not have an advanced directive.         Objective   Physical Exam   Constitutional: She is oriented to person, place, and time. She appears well-developed and well-nourished. No distress.   HENT:   Head: Normocephalic and atraumatic.   Nose: Nose normal.   Mouth/Throat: Oropharynx is clear and moist.   Eyes: Conjunctivae are normal. Pupils are equal, round, and reactive to light. No scleral icterus.   Neck: Normal range of motion. Neck supple.   Cardiovascular: Normal rate, regular rhythm, normal heart sounds and intact distal pulses.   No murmur heard.  Pulmonary/Chest: Effort normal and breath sounds normal. No respiratory  distress.   Abdominal: Soft. Bowel sounds are normal. There is no tenderness.   Genitourinary: Rectal exam shows no external hemorrhoid.   Genitourinary Comments: Patient has a large fecal impaction with soft stool. No external hemorrhoids.  No blood.   Musculoskeletal: Normal range of motion. She exhibits edema.   Patient has trace edema to her bilateral lower extremities.    Neurological: She is alert and oriented to person, place, and time.   Skin: Skin is warm and dry. She is not diaphoretic.   Psychiatric: She has a normal mood and affect. Her behavior is normal.   Nursing note and vitals reviewed.      Procedures         ED Course    The pt primarily complains of rectal pain.  Her SOA is chronic and she downplays that as the reason for being here.  Here rectal exam reveals an impaction.  I ordered warm soap suds enema.  Her labs are c/w her known hx of ESRD.  Her elevated troponin is chronic and related to the ESRD as well.  No significant pulmonary edema.  Pt had large BM after enema and states relief of rectal pain.  Will plan to d/c back to [facility] on MiraLax and have f/u as needed.    ED Course as of Nov 10 1450   Sun Nov 10, 2019   1445 RAMSEY Morrison is at bedside re-evaluating the patient, discussing lab/imaging results, and updating on plan.  [AT]   1447 Upon re-evaluation the patient tells me that the enema worked well and she is ready to return to [facility]. -HNB  [AT]      ED Course User Index  [AT] Marcia Chappell       Recent Results (from the past 24 hour(s))   Comprehensive Metabolic Panel    Collection Time: 11/10/19  1:18 PM   Result Value Ref Range    Glucose 305 (H) 65 - 99 mg/dL    BUN 70 (H) 8 - 23 mg/dL    Creatinine 5.28 (H) 0.57 - 1.00 mg/dL    Sodium 137 136 - 145 mmol/L    Potassium 5.2 3.5 - 5.2 mmol/L    Chloride 102 98 - 107 mmol/L    CO2 20.0 (L) 22.0 - 29.0 mmol/L    Calcium 8.5 (L) 8.6 - 10.5 mg/dL    Total Protein 7.7 6.0 - 8.5 g/dL    Albumin 3.00 (L) 3.50 -  5.20 g/dL    ALT (SGPT) 8 1 - 33 U/L    AST (SGOT) 10 1 - 32 U/L    Alkaline Phosphatase 140 (H) 39 - 117 U/L    Total Bilirubin 0.3 0.2 - 1.2 mg/dL    eGFR Non  Amer      eGFR  African Amer 10 (L) >60 mL/min/1.73    Globulin 4.7 gm/dL    A/G Ratio 0.6 g/dL    BUN/Creatinine Ratio 13.3 7.0 - 25.0    Anion Gap 15.0 5.0 - 15.0 mmol/L   BNP    Collection Time: 11/10/19  1:18 PM   Result Value Ref Range    proBNP 3,462.0 (H) 5.0 - 900.0 pg/mL   Troponin    Collection Time: 11/10/19  1:18 PM   Result Value Ref Range    Troponin T 0.152 (C) 0.000 - 0.030 ng/mL   Light Blue Top    Collection Time: 11/10/19  1:18 PM   Result Value Ref Range    Extra Tube hold for add-on    Green Top (Gel)    Collection Time: 11/10/19  1:18 PM   Result Value Ref Range    Extra Tube Hold for add-ons.    Lavender Top    Collection Time: 11/10/19  1:18 PM   Result Value Ref Range    Extra Tube hold for add-on    Gold Top - SST    Collection Time: 11/10/19  1:18 PM   Result Value Ref Range    Extra Tube Hold for add-ons.    CBC Auto Differential    Collection Time: 11/10/19  1:18 PM   Result Value Ref Range    WBC 6.28 3.40 - 10.80 10*3/mm3    RBC 4.13 3.77 - 5.28 10*6/mm3    Hemoglobin 11.7 (L) 12.0 - 15.9 g/dL    Hematocrit 39.7 34.0 - 46.6 %    MCV 96.1 79.0 - 97.0 fL    MCH 28.3 26.6 - 33.0 pg    MCHC 29.5 (L) 31.5 - 35.7 g/dL    RDW 17.6 (H) 12.3 - 15.4 %    RDW-SD 62.5 (H) 37.0 - 54.0 fl    MPV 11.2 6.0 - 12.0 fL    Platelets 227 140 - 450 10*3/mm3    Neutrophil % 63.7 42.7 - 76.0 %    Lymphocyte % 20.4 19.6 - 45.3 %    Monocyte % 8.4 5.0 - 12.0 %    Eosinophil % 6.5 (H) 0.3 - 6.2 %    Basophil % 0.5 0.0 - 1.5 %    Immature Grans % 0.5 0.0 - 0.5 %    Neutrophils, Absolute 4.00 1.70 - 7.00 10*3/mm3    Lymphocytes, Absolute 1.28 0.70 - 3.10 10*3/mm3    Monocytes, Absolute 0.53 0.10 - 0.90 10*3/mm3    Eosinophils, Absolute 0.41 (H) 0.00 - 0.40 10*3/mm3    Basophils, Absolute 0.03 0.00 - 0.20 10*3/mm3    Immature Grans, Absolute 0.03  "0.00 - 0.05 10*3/mm3    nRBC 0.0 0.0 - 0.2 /100 WBC     Note: In addition to lab results from this visit, the labs listed above may include labs taken at another facility or during a different encounter within the last 24 hours. Please correlate lab times with ED admission and discharge times for further clarification of the services performed during this visit.    XR Chest 1 View   Preliminary Result   Cardiomegaly with increased pulmonary vascularity and   minimal perihilar predominant pulmonary opacifications however no   pleural effusion is evident. Considerations for peribronchial   inflammatory process and/or mild central vascular congestion.                Vitals:    11/10/19 1300   BP: (!) 181/97   BP Location: Right arm   Patient Position: Lying   Pulse: 79   Resp: 18   Temp: 98.4 °F (36.9 °C)   TempSrc: Oral   SpO2: 100%   Weight: 136 kg (300 lb)   Height: 162.6 cm (64\")     Medications   sodium chloride 0.9 % flush 10 mL (not administered)     ECG/EMG Results (last 24 hours)     Procedure Component Value Units Date/Time    ECG 12 Lead [[id number]] Collected:  11/10/19 1301     Updated:  11/10/19 1302        ECG 12 Lead                       MDM    Final diagnoses:   Rectal pain   Fecal impaction (CMS/HCC)   Chronic shortness of breath       Documentation assistance provided by abelardo Chappell.  Information recorded by the scribe was done at my direction and has been verified and validated by me.     Marcia Chappell  11/10/19 1331       Jimi Ricks PA  11/10/19 1450    "

## 2019-11-10 NOTE — DISCHARGE INSTRUCTIONS
Rest.  Continue current meds.  Take MiraLax two to three times daily until bowels are moving well with soft stool.  Follow up with your PCP as needed.  Return to ER if worse.

## 2019-11-11 NOTE — ED PROVIDER NOTES
Subjective   Ms. Joy Bueno is a 69 y.o. female who presents to the ED from [facility] with complaints of hypertension. She indicates that she was seen here in the ED earlier today for a fecal impaction and was sent back to [facility] after an enema with significant relief. However, upon returning to [facility] she was found to be hypertensive so staff gave her Clonidine and Coreg before calling EMS to bring her back into our ED. Upon EMS arrival they initially got a blood pressure of 207/91 but after switching to an appropriately sized blood pressure cuff, they got a reading of 154/114 and here in the ED she has a blood pressure reading of 131/80. Her only complaint at this time is a headache. She has a history of HTN, DM, and CKD on dialysis with her last dialysis being 2 days ago. However, nurse from [facility] indicates that Ms. Bueno is non-compliant with dialysis.No other acute complaints at this time.         History provided by:  Patient and EMS personnel  Hypertension   Severity:  Moderate  Timing:  Constant  Progression:  Resolved  Notable PTA blood pressures:  207/91  Relieved by: Clonidine, Coreg.  Associated symptoms: headaches    Associated symptoms: no abdominal pain, no chest pain, no epistaxis, no fever and no shortness of breath        Review of Systems   Constitutional: Negative for fever.   HENT: Negative for nosebleeds.    Eyes: Negative for visual disturbance.   Respiratory: Negative for shortness of breath.    Cardiovascular: Negative for chest pain.   Gastrointestinal: Negative for abdominal pain.   Musculoskeletal: Negative for back pain.   Neurological: Positive for headaches.   Hematological: Negative.    Psychiatric/Behavioral: Negative.    All other systems reviewed and are negative.      Past Medical History:   Diagnosis Date   • Acute on chronic diastolic heart failure (CMS/HCC)    • Acute on chronic heart failure (CMS/HCC)    • Acute respiratory failure  with hypercapnia (CMS/HCC) 4/27/2019   • Anemia    • Anxiety    • Asthma    • Bilateral leg pain 10/4/2017   • Boil     on head    • Chest wall abscess 4/5/2019   • Chronic diastolic heart failure (CMS/HCC)    • Chronic kidney disease     stage 4   • Diabetes mellitus (CMS/HCC)     checks sugar once per day    • Diabetes mellitus, type II (CMS/HCC) 8/3/2018   • Dialysis patient (CMS/HCC)     mwf   • Dizzy    • Elevated troponin 9/1/2019   • Encephalopathy, metabolic 4/27/2019   • Generalized weakness 7/31/2019   • GERD (gastroesophageal reflux disease)    • Headache    • History of Clostridium difficile infection 08/03/2018    h/o   • History of respiratory failure, since off home oxygen 8/3/2018   • History of UTI 8/3/2018   • Hyperkalemia 7/31/2019   • Hypertension    • Morbid obesity (CMS/HCC)    • Osteoarthritis    • Sleep apnea     doesnt use machine     • Slow transit constipation 2/20/2019   • Tinea capitis 8/3/2018   • URI (upper respiratory infection) 4/5/2019   • Urinary tract infection due to extended-spectrum beta lactamase (ESBL)-producing Klebsiella 2/20/2019   • Urinary tract infection without hematuria 9/1/2019   • UTI (urinary tract infection), bacterial 10/4/2017   • Vertigo    • Volume overload 4/6/2019       Allergies   Allergen Reactions   • Geodon [Ziprasidone Hcl] Unknown (See Comments)     PT DOESN'T REMEMBER   • Haldol [Haloperidol Lactate] Unknown (See Comments)     PT DOESN'T REMEMBER   • Seroquel [Quetiapine Fumarate] Unknown (See Comments)     PT DOESN'T REMEMBER       Past Surgical History:   Procedure Laterality Date   • ARM LESION/CYST EXCISION N/A 8/22/2019    Procedure: ABSCESS DRAINAGE X2 ON BACK;  Surgeon: Carlos Enrique Calderón MD;  Location:  [facility];  Service: General   • ARTERIOVENOUS FISTULA/SHUNT SURGERY Left 10/22/2018    Procedure: LEFT UPPER EXTREMITY ARTERIOVENOUS FISTULA CREATION;  Surgeon: Carlos Enrique Calderón MD;  Location:  [facility];  Service: Vascular   •  CATARACT EXTRACTION      bilat    •  SECTION     • COLONOSCOPY N/A 2017    Procedure: COLONOSCOPY;  Surgeon: Mark I Brunner, MD;  Location:  [facility];  Service:    • ENDOSCOPY N/A 2017    Procedure: ESOPHAGOGASTRODUODENOSCOPY ;  Surgeon: Velasquez Call MD;  Location:  [facility];  Service:    • HERNIA REPAIR      umbilical hernia unsure about mesh    • INSERTION HEMODIALYSIS CATHETER Right 10/17/2018    Procedure: HEMODIALYSIS CATHETER INSERTION;  Surgeon: Carlos Enrique Calderón MD;  Location:  [facility];  Service: General   • TONSILLECTOMY         Family History   Problem Relation Age of Onset   • Cardiomyopathy Mother    • Stroke Father    • Diabetes Father        Social History     Socioeconomic History   • Marital status:      Spouse name: Not on file   • Number of children: Not on file   • Years of education: Not on file   • Highest education level: Not on file   Tobacco Use   • Smoking status: Former Smoker     Packs/day: 0.05     Types: Cigarettes     Last attempt to quit:      Years since quittin.8   • Smokeless tobacco: Never Used   • Tobacco comment: smoked since high school but didnt smoke much and unsure when quit fully    Substance and Sexual Activity   • Alcohol use: No   • Drug use: No   • Sexual activity: Defer   Social History Narrative    Mrs. Bueno is a 68 year old AA  female. She lives alone in [place] but has been in rehab for some time. She has a daughter. She does not have an advanced directive.         Objective   Physical Exam   Constitutional: She is oriented to person, place, and time. She appears well-developed and well-nourished. No distress.   HENT:   Head: Normocephalic and atraumatic.   Nose: Nose normal.   Eyes: Conjunctivae are normal. No scleral icterus.   Neck: Normal range of motion. Neck supple.   Cardiovascular: Normal rate, regular rhythm and normal heart sounds.   No murmur heard.  Pulmonary/Chest: Effort normal and  breath sounds normal. No respiratory distress.   Abdominal: Soft. Bowel sounds are normal. There is no tenderness.   Musculoskeletal: Normal range of motion.   Neurological: She is alert and oriented to person, place, and time.   Skin: Skin is warm and dry. She is not diaphoretic.   Psychiatric: She has a normal mood and affect. Her behavior is normal.   Nursing note and vitals reviewed.      Procedures         ED Course    The pt appears in no distress.  Her BP has been consistently in the 130s and 140s in the ER.  Will d/c back to [facility] to continue her meds.  ED Course as of Nov 10 2037   Sun Nov 10, 2019   1953 BP: 131/80 [RS]   2031 RAMSEY Morrison is at bedside re-evaluating the patient and updating on plan.  [AT]      ED Course User Index  [AT] Marcia Chappell  [RS] Jose Chacko MD       Recent Results (from the past 24 hour(s))   Comprehensive Metabolic Panel    Collection Time: 11/10/19  1:18 PM   Result Value Ref Range    Glucose 305 (H) 65 - 99 mg/dL    BUN 70 (H) 8 - 23 mg/dL    Creatinine 5.28 (H) 0.57 - 1.00 mg/dL    Sodium 137 136 - 145 mmol/L    Potassium 5.2 3.5 - 5.2 mmol/L    Chloride 102 98 - 107 mmol/L    CO2 20.0 (L) 22.0 - 29.0 mmol/L    Calcium 8.5 (L) 8.6 - 10.5 mg/dL    Total Protein 7.7 6.0 - 8.5 g/dL    Albumin 3.00 (L) 3.50 - 5.20 g/dL    ALT (SGPT) 8 1 - 33 U/L    AST (SGOT) 10 1 - 32 U/L    Alkaline Phosphatase 140 (H) 39 - 117 U/L    Total Bilirubin 0.3 0.2 - 1.2 mg/dL    eGFR Non  Amer      eGFR  African Amer 10 (L) >60 mL/min/1.73    Globulin 4.7 gm/dL    A/G Ratio 0.6 g/dL    BUN/Creatinine Ratio 13.3 7.0 - 25.0    Anion Gap 15.0 5.0 - 15.0 mmol/L   BNP    Collection Time: 11/10/19  1:18 PM   Result Value Ref Range    proBNP 3,462.0 (H) 5.0 - 900.0 pg/mL   Troponin    Collection Time: 11/10/19  1:18 PM   Result Value Ref Range    Troponin T 0.152 (C) 0.000 - 0.030 ng/mL   Light Blue Top    Collection Time: 11/10/19  1:18 PM   Result Value Ref Range     Extra Tube hold for add-on    Green Top (Gel)    Collection Time: 11/10/19  1:18 PM   Result Value Ref Range    Extra Tube Hold for add-ons.    Lavender Top    Collection Time: 11/10/19  1:18 PM   Result Value Ref Range    Extra Tube hold for add-on    Gold Top - SST    Collection Time: 11/10/19  1:18 PM   Result Value Ref Range    Extra Tube Hold for add-ons.    CBC Auto Differential    Collection Time: 11/10/19  1:18 PM   Result Value Ref Range    WBC 6.28 3.40 - 10.80 10*3/mm3    RBC 4.13 3.77 - 5.28 10*6/mm3    Hemoglobin 11.7 (L) 12.0 - 15.9 g/dL    Hematocrit 39.7 34.0 - 46.6 %    MCV 96.1 79.0 - 97.0 fL    MCH 28.3 26.6 - 33.0 pg    MCHC 29.5 (L) 31.5 - 35.7 g/dL    RDW 17.6 (H) 12.3 - 15.4 %    RDW-SD 62.5 (H) 37.0 - 54.0 fl    MPV 11.2 6.0 - 12.0 fL    Platelets 227 140 - 450 10*3/mm3    Neutrophil % 63.7 42.7 - 76.0 %    Lymphocyte % 20.4 19.6 - 45.3 %    Monocyte % 8.4 5.0 - 12.0 %    Eosinophil % 6.5 (H) 0.3 - 6.2 %    Basophil % 0.5 0.0 - 1.5 %    Immature Grans % 0.5 0.0 - 0.5 %    Neutrophils, Absolute 4.00 1.70 - 7.00 10*3/mm3    Lymphocytes, Absolute 1.28 0.70 - 3.10 10*3/mm3    Monocytes, Absolute 0.53 0.10 - 0.90 10*3/mm3    Eosinophils, Absolute 0.41 (H) 0.00 - 0.40 10*3/mm3    Basophils, Absolute 0.03 0.00 - 0.20 10*3/mm3    Immature Grans, Absolute 0.03 0.00 - 0.05 10*3/mm3    nRBC 0.0 0.0 - 0.2 /100 WBC     Note: In addition to lab results from this visit, the labs listed above may include labs taken at another facility or during a different encounter within the last 24 hours. Please correlate lab times with ED admission and discharge times for further clarification of the services performed during this visit.    No orders to display     Vitals:    11/1950 11/10/19 1952 11/10/19 2000 11/10/19 2030   BP: 153/79  125/92 142/65   BP Location:       Patient Position:       Pulse:  74 74 70   Resp:       Temp:       TempSrc:       SpO2: 98% 98% 99% 98%   Weight:       Height:          Medications - No data to display  ECG/EMG Results (last 24 hours)     ** No results found for the last 24 hours. **        No orders to display                 MDM    Final diagnoses:   Essential hypertension       Documentation assistance provided by abelardo Chappell.  Information recorded by the scribe was done at my direction and has been verified and validated by me.     Marcia Chappell  11/10/19 2002       Jimi Ricks, PA  11/10/19 2037

## 2019-11-15 ENCOUNTER — HOSPITAL ENCOUNTER (OUTPATIENT)
Facility: HOSPITAL | Age: 69
Setting detail: HOSPITAL OUTPATIENT SURGERY
Discharge: NURSING, REHAB OR LONG-TERM CARE | End: 2019-11-15
Attending: SURGERY | Admitting: SURGERY

## 2019-11-15 ENCOUNTER — ANESTHESIA (OUTPATIENT)
Dept: PERIOP | Facility: HOSPITAL | Age: 69
End: 2019-11-15

## 2019-11-15 ENCOUNTER — ANESTHESIA EVENT (OUTPATIENT)
Dept: PERIOP | Facility: HOSPITAL | Age: 69
End: 2019-11-15

## 2019-11-15 VITALS
DIASTOLIC BLOOD PRESSURE: 85 MMHG | HEIGHT: 64 IN | BODY MASS INDEX: 50.02 KG/M2 | SYSTOLIC BLOOD PRESSURE: 200 MMHG | TEMPERATURE: 98 F | HEART RATE: 73 BPM | OXYGEN SATURATION: 97 % | WEIGHT: 293 LBS | RESPIRATION RATE: 18 BRPM

## 2019-11-15 LAB
GLUCOSE BLDC GLUCOMTR-MCNC: 221 MG/DL (ref 70–130)
GLUCOSE BLDC GLUCOMTR-MCNC: 281 MG/DL (ref 70–130)
INR PPP: 1.39 (ref 0.85–1.16)
POTASSIUM BLD-SCNC: 4.9 MMOL/L (ref 3.5–5.2)
PROTHROMBIN TIME: 16.4 SECONDS (ref 11.2–14.3)

## 2019-11-15 PROCEDURE — 25010000003 LIDOCAINE 1 % SOLUTION: Performed by: NURSE ANESTHETIST, CERTIFIED REGISTERED

## 2019-11-15 PROCEDURE — 25010000002 VANCOMYCIN 10 G RECONSTITUTED SOLUTION: Performed by: SURGERY

## 2019-11-15 PROCEDURE — 25010000002 ONDANSETRON PER 1 MG: Performed by: NURSE ANESTHETIST, CERTIFIED REGISTERED

## 2019-11-15 PROCEDURE — 84132 ASSAY OF SERUM POTASSIUM: CPT | Performed by: ANESTHESIOLOGY

## 2019-11-15 PROCEDURE — 94640 AIRWAY INHALATION TREATMENT: CPT

## 2019-11-15 PROCEDURE — 25010000002 ROPIVACAINE PER 1 MG: Performed by: ANESTHESIOLOGY

## 2019-11-15 PROCEDURE — 82962 GLUCOSE BLOOD TEST: CPT

## 2019-11-15 PROCEDURE — 25010000002 PHENYLEPHRINE PER 1 ML: Performed by: NURSE ANESTHETIST, CERTIFIED REGISTERED

## 2019-11-15 PROCEDURE — 25010000002 HEPARIN (PORCINE) PER 1000 UNITS: Performed by: SURGERY

## 2019-11-15 PROCEDURE — 63710000001 INSULIN LISPRO (HUMAN) PER 5 UNITS: Performed by: NURSE ANESTHETIST, CERTIFIED REGISTERED

## 2019-11-15 PROCEDURE — 25010000002 FENTANYL CITRATE (PF) 100 MCG/2ML SOLUTION: Performed by: NURSE ANESTHETIST, CERTIFIED REGISTERED

## 2019-11-15 PROCEDURE — 25010000002 NEOSTIGMINE 10 MG/10ML SOLUTION: Performed by: NURSE ANESTHETIST, CERTIFIED REGISTERED

## 2019-11-15 PROCEDURE — 85610 PROTHROMBIN TIME: CPT | Performed by: SURGERY

## 2019-11-15 PROCEDURE — 25010000002 DEXAMETHASONE PER 1 MG: Performed by: NURSE ANESTHETIST, CERTIFIED REGISTERED

## 2019-11-15 PROCEDURE — 25010000002 PROPOFOL 10 MG/ML EMULSION: Performed by: NURSE ANESTHETIST, CERTIFIED REGISTERED

## 2019-11-15 PROCEDURE — 63710000001 INSULIN LISPRO (HUMAN) PER 5 UNITS

## 2019-11-15 RX ORDER — SODIUM CHLORIDE 0.9 % (FLUSH) 0.9 %
3 SYRINGE (ML) INJECTION EVERY 12 HOURS SCHEDULED
Status: DISCONTINUED | OUTPATIENT
Start: 2019-11-15 | End: 2019-11-16 | Stop reason: HOSPADM

## 2019-11-15 RX ORDER — ONDANSETRON 2 MG/ML
INJECTION INTRAMUSCULAR; INTRAVENOUS AS NEEDED
Status: DISCONTINUED | OUTPATIENT
Start: 2019-11-15 | End: 2019-11-15 | Stop reason: SURG

## 2019-11-15 RX ORDER — LIDOCAINE HYDROCHLORIDE 10 MG/ML
INJECTION, SOLUTION INFILTRATION; PERINEURAL AS NEEDED
Status: DISCONTINUED | OUTPATIENT
Start: 2019-11-15 | End: 2019-11-15 | Stop reason: SURG

## 2019-11-15 RX ORDER — NEOSTIGMINE METHYLSULFATE 1 MG/ML
INJECTION, SOLUTION INTRAVENOUS AS NEEDED
Status: DISCONTINUED | OUTPATIENT
Start: 2019-11-15 | End: 2019-11-15 | Stop reason: SURG

## 2019-11-15 RX ORDER — FENTANYL CITRATE 50 UG/ML
25 INJECTION, SOLUTION INTRAMUSCULAR; INTRAVENOUS
Status: DISCONTINUED | OUTPATIENT
Start: 2019-11-15 | End: 2019-11-16 | Stop reason: HOSPADM

## 2019-11-15 RX ORDER — NALOXONE HCL 0.4 MG/ML
0.4 VIAL (ML) INJECTION AS NEEDED
Status: DISCONTINUED | OUTPATIENT
Start: 2019-11-15 | End: 2019-11-16 | Stop reason: HOSPADM

## 2019-11-15 RX ORDER — LABETALOL HYDROCHLORIDE 5 MG/ML
5 INJECTION, SOLUTION INTRAVENOUS
Status: DISCONTINUED | OUTPATIENT
Start: 2019-11-15 | End: 2019-11-16 | Stop reason: HOSPADM

## 2019-11-15 RX ORDER — SODIUM CHLORIDE 0.9 % (FLUSH) 0.9 %
3 SYRINGE (ML) INJECTION EVERY 12 HOURS SCHEDULED
Status: DISCONTINUED | OUTPATIENT
Start: 2019-11-15 | End: 2019-11-15 | Stop reason: HOSPADM

## 2019-11-15 RX ORDER — SODIUM CHLORIDE 9 MG/ML
9 INJECTION, SOLUTION INTRAVENOUS CONTINUOUS
Status: DISCONTINUED | OUTPATIENT
Start: 2019-11-15 | End: 2019-11-16 | Stop reason: HOSPADM

## 2019-11-15 RX ORDER — ATRACURIUM BESYLATE 10 MG/ML
INJECTION, SOLUTION INTRAVENOUS AS NEEDED
Status: DISCONTINUED | OUTPATIENT
Start: 2019-11-15 | End: 2019-11-15 | Stop reason: SURG

## 2019-11-15 RX ORDER — FENTANYL CITRATE 50 UG/ML
INJECTION, SOLUTION INTRAMUSCULAR; INTRAVENOUS AS NEEDED
Status: DISCONTINUED | OUTPATIENT
Start: 2019-11-15 | End: 2019-11-15 | Stop reason: SURG

## 2019-11-15 RX ORDER — HYDROCODONE BITARTRATE AND ACETAMINOPHEN 5; 325 MG/1; MG/1
1 TABLET ORAL EVERY 8 HOURS PRN
Qty: 10 TABLET | Refills: 0 | Status: ON HOLD | OUTPATIENT
Start: 2019-11-15 | End: 2020-07-06 | Stop reason: SDUPTHER

## 2019-11-15 RX ORDER — DEXAMETHASONE SODIUM PHOSPHATE 4 MG/ML
INJECTION, SOLUTION INTRA-ARTICULAR; INTRALESIONAL; INTRAMUSCULAR; INTRAVENOUS; SOFT TISSUE AS NEEDED
Status: DISCONTINUED | OUTPATIENT
Start: 2019-11-15 | End: 2019-11-15 | Stop reason: SURG

## 2019-11-15 RX ORDER — SODIUM CHLORIDE, SODIUM LACTATE, POTASSIUM CHLORIDE, CALCIUM CHLORIDE 600; 310; 30; 20 MG/100ML; MG/100ML; MG/100ML; MG/100ML
9 INJECTION, SOLUTION INTRAVENOUS CONTINUOUS PRN
Status: DISCONTINUED | OUTPATIENT
Start: 2019-11-15 | End: 2019-11-16 | Stop reason: HOSPADM

## 2019-11-15 RX ORDER — ONDANSETRON 2 MG/ML
4 INJECTION INTRAMUSCULAR; INTRAVENOUS ONCE AS NEEDED
Status: DISCONTINUED | OUTPATIENT
Start: 2019-11-15 | End: 2019-11-16 | Stop reason: HOSPADM

## 2019-11-15 RX ORDER — PROMETHAZINE HYDROCHLORIDE 25 MG/1
25 TABLET ORAL ONCE AS NEEDED
Status: DISCONTINUED | OUTPATIENT
Start: 2019-11-15 | End: 2019-11-16 | Stop reason: HOSPADM

## 2019-11-15 RX ORDER — FAMOTIDINE 20 MG/1
20 TABLET, FILM COATED ORAL
Status: DISCONTINUED | OUTPATIENT
Start: 2019-11-15 | End: 2019-11-15 | Stop reason: HOSPADM

## 2019-11-15 RX ORDER — SODIUM CHLORIDE 0.9 % (FLUSH) 0.9 %
3-10 SYRINGE (ML) INJECTION AS NEEDED
Status: DISCONTINUED | OUTPATIENT
Start: 2019-11-15 | End: 2019-11-16 | Stop reason: HOSPADM

## 2019-11-15 RX ORDER — IPRATROPIUM BROMIDE AND ALBUTEROL SULFATE 2.5; .5 MG/3ML; MG/3ML
3 SOLUTION RESPIRATORY (INHALATION) ONCE AS NEEDED
Status: COMPLETED | OUTPATIENT
Start: 2019-11-15 | End: 2019-11-15

## 2019-11-15 RX ORDER — PROMETHAZINE HYDROCHLORIDE 25 MG/ML
6.25 INJECTION, SOLUTION INTRAMUSCULAR; INTRAVENOUS ONCE AS NEEDED
Status: DISCONTINUED | OUTPATIENT
Start: 2019-11-15 | End: 2019-11-16 | Stop reason: HOSPADM

## 2019-11-15 RX ORDER — HEPARIN SODIUM 1000 [USP'U]/ML
INJECTION, SOLUTION INTRAVENOUS; SUBCUTANEOUS AS NEEDED
Status: DISCONTINUED | OUTPATIENT
Start: 2019-11-15 | End: 2019-11-15 | Stop reason: HOSPADM

## 2019-11-15 RX ORDER — LIDOCAINE HYDROCHLORIDE 10 MG/ML
0.5 INJECTION, SOLUTION EPIDURAL; INFILTRATION; INTRACAUDAL; PERINEURAL ONCE AS NEEDED
Status: COMPLETED | OUTPATIENT
Start: 2019-11-15 | End: 2019-11-15

## 2019-11-15 RX ORDER — ESMOLOL HYDROCHLORIDE 10 MG/ML
INJECTION INTRAVENOUS AS NEEDED
Status: DISCONTINUED | OUTPATIENT
Start: 2019-11-15 | End: 2019-11-15 | Stop reason: SURG

## 2019-11-15 RX ORDER — MEPERIDINE HYDROCHLORIDE 25 MG/ML
12.5 INJECTION INTRAMUSCULAR; INTRAVENOUS; SUBCUTANEOUS
Status: DISCONTINUED | OUTPATIENT
Start: 2019-11-15 | End: 2019-11-16 | Stop reason: HOSPADM

## 2019-11-15 RX ORDER — PROPOFOL 10 MG/ML
VIAL (ML) INTRAVENOUS AS NEEDED
Status: DISCONTINUED | OUTPATIENT
Start: 2019-11-15 | End: 2019-11-15 | Stop reason: SURG

## 2019-11-15 RX ORDER — ROPIVACAINE HYDROCHLORIDE 5 MG/ML
INJECTION, SOLUTION EPIDURAL; INFILTRATION; PERINEURAL
Status: COMPLETED | OUTPATIENT
Start: 2019-11-15 | End: 2019-11-15

## 2019-11-15 RX ORDER — GLYCOPYRROLATE 0.2 MG/ML
INJECTION INTRAMUSCULAR; INTRAVENOUS AS NEEDED
Status: DISCONTINUED | OUTPATIENT
Start: 2019-11-15 | End: 2019-11-15 | Stop reason: SURG

## 2019-11-15 RX ORDER — PROMETHAZINE HYDROCHLORIDE 25 MG/1
25 SUPPOSITORY RECTAL ONCE AS NEEDED
Status: DISCONTINUED | OUTPATIENT
Start: 2019-11-15 | End: 2019-11-16 | Stop reason: HOSPADM

## 2019-11-15 RX ORDER — SODIUM CHLORIDE 0.9 % (FLUSH) 0.9 %
3-10 SYRINGE (ML) INJECTION AS NEEDED
Status: DISCONTINUED | OUTPATIENT
Start: 2019-11-15 | End: 2019-11-15 | Stop reason: HOSPADM

## 2019-11-15 RX ADMIN — FENTANYL CITRATE 25 MCG: 0.05 INJECTION, SOLUTION INTRAMUSCULAR; INTRAVENOUS at 16:25

## 2019-11-15 RX ADMIN — LABETALOL 20 MG/4 ML (5 MG/ML) INTRAVENOUS SYRINGE 5 MG: at 16:48

## 2019-11-15 RX ADMIN — LIDOCAINE HYDROCHLORIDE 0.5 ML: 10 INJECTION, SOLUTION EPIDURAL; INFILTRATION; INTRACAUDAL; PERINEURAL at 11:41

## 2019-11-15 RX ADMIN — PROPOFOL 80 MG: 10 INJECTION, EMULSION INTRAVENOUS at 15:08

## 2019-11-15 RX ADMIN — VANCOMYCIN HYDROCHLORIDE 2000 MG: 10 INJECTION, POWDER, LYOPHILIZED, FOR SOLUTION INTRAVENOUS at 11:41

## 2019-11-15 RX ADMIN — LIDOCAINE HYDROCHLORIDE 50 MG: 10 INJECTION, SOLUTION INFILTRATION; PERINEURAL at 13:33

## 2019-11-15 RX ADMIN — SODIUM CHLORIDE 9 ML/HR: 9 INJECTION, SOLUTION INTRAVENOUS at 11:41

## 2019-11-15 RX ADMIN — PROPOFOL 50 MG: 10 INJECTION, EMULSION INTRAVENOUS at 15:06

## 2019-11-15 RX ADMIN — DEXAMETHASONE SODIUM PHOSPHATE 4 MG: 4 INJECTION, SOLUTION INTRAMUSCULAR; INTRAVENOUS at 13:41

## 2019-11-15 RX ADMIN — ROPIVACAINE HYDROCHLORIDE 30 ML: 5 INJECTION, SOLUTION EPIDURAL; INFILTRATION; PERINEURAL at 12:00

## 2019-11-15 RX ADMIN — IPRATROPIUM BROMIDE AND ALBUTEROL SULFATE 3 ML: 2.5; .5 SOLUTION RESPIRATORY (INHALATION) at 17:11

## 2019-11-15 RX ADMIN — NEOSTIGMINE METHYLSULFATE 2 MG: 1 INJECTION, SOLUTION INTRAVENOUS at 15:20

## 2019-11-15 RX ADMIN — GLYCOPYRROLATE 0.4 MG: 0.2 INJECTION, SOLUTION INTRAMUSCULAR; INTRAVENOUS at 15:20

## 2019-11-15 RX ADMIN — INSULIN LISPRO 5 UNITS: 100 INJECTION, SOLUTION INTRAVENOUS; SUBCUTANEOUS at 17:01

## 2019-11-15 RX ADMIN — ESMOLOL HYDROCHLORIDE 60 MG: 10 INJECTION INTRAVENOUS at 13:31

## 2019-11-15 RX ADMIN — FENTANYL CITRATE 50 MCG: 50 INJECTION, SOLUTION INTRAMUSCULAR; INTRAVENOUS at 15:06

## 2019-11-15 RX ADMIN — LABETALOL 20 MG/4 ML (5 MG/ML) INTRAVENOUS SYRINGE 5 MG: at 16:33

## 2019-11-15 RX ADMIN — ATRACURIUM BESYLATE 40 MG: 10 INJECTION, SOLUTION INTRAVENOUS at 13:33

## 2019-11-15 RX ADMIN — PHENYLEPHRINE HYDROCHLORIDE 0.5 MCG/KG/MIN: 10 INJECTION INTRAVENOUS at 13:51

## 2019-11-15 RX ADMIN — ONDANSETRON 4 MG: 2 INJECTION INTRAMUSCULAR; INTRAVENOUS at 15:20

## 2019-11-15 RX ADMIN — PHENYLEPHRINE HYDROCHLORIDE 100 MCG: 10 INJECTION INTRAVENOUS at 13:55

## 2019-11-15 RX ADMIN — FENTANYL CITRATE 50 MCG: 50 INJECTION, SOLUTION INTRAMUSCULAR; INTRAVENOUS at 13:33

## 2019-11-15 RX ADMIN — PROPOFOL 150 MG: 10 INJECTION, EMULSION INTRAVENOUS at 13:33

## 2019-11-15 NOTE — ANESTHESIA PREPROCEDURE EVALUATION
Anesthesia Evaluation     Patient summary reviewed and Nursing notes reviewed   NPO Solid Status: > 8 hours  NPO Liquid Status: > 2 hours           Airway   Mallampati: III  TM distance: >3 FB  Neck ROM: full  Possible difficult intubation  Dental - normal exam     Pulmonary    (+) asthma,recent URI resolved, sleep apnea, decreased breath sounds,   Cardiovascular     Rhythm: regular  Rate: normal    (+) hypertension well controlled 2 medications or greater, CAD, CHF , DVT,     ROS comment: EF 60%    Neuro/Psych  (+) headaches, dizziness/light headedness, psychiatric history,     GI/Hepatic/Renal/Endo    (+) morbid obesity, GERD well controlled,  renal disease ESRD, diabetes mellitus type 2 well controlled using insulin,   (-)  obesity    Musculoskeletal     Abdominal   (+) obese,     Abdomen: soft.   Substance History      OB/GYN          Other   arthritis,                    Anesthesia Plan    ASA 3     general with block     intravenous induction     Anesthetic plan, all risks, benefits, and alternatives have been provided, discussed and informed consent has been obtained with: patient.    Plan discussed with CRNA.

## 2019-11-15 NOTE — INTERVAL H&P NOTE
[facility] Pre-op    Full history and physical note from office is up to date.  See office note attached.    · 9/30/19 TTE:  Estimated EF = 60%.  Left ventricular systolic function is normal.    Seen in [facility] ED 11/10/19 with uncontrolled HTN, fecal impaction, chronic SOA    Last HD Wednesday    There were no vitals taken for this visit.    LAB Results:  Lab Results   Component Value Date    WBC 6.28 11/10/2019    HGB 11.7 (L) 11/10/2019    HCT 39.7 11/10/2019    MCV 96.1 11/10/2019     11/10/2019    NEUTROABS 4.00 11/10/2019    GLUCOSE 305 (H) 11/10/2019    BUN 70 (H) 11/10/2019    CREATININE 5.28 (H) 11/10/2019    EGFRIFNONA  11/10/2019      Comment:      <15 Indicative of kidney failure.    EGFRIFAFRI 10 (L) 11/10/2019     11/10/2019    K 5.2 11/10/2019     11/10/2019    CO2 20.0 (L) 11/10/2019    MG 1.4 (L) 10/14/2019    PHOS 4.4 09/03/2019    CALCIUM 8.5 (L) 11/10/2019    ALBUMIN 3.00 (L) 11/10/2019    AST 10 11/10/2019    ALT 8 11/10/2019    BILITOT 0.3 11/10/2019       Cancer Staging (if applicable)  Cancer Patient: __ yes _x_no __unknown__N/A; If yes, clinical stage T:__ N:__M:__, stage group or __N/A    K+ pending    Ashanti Ospina, APRN 11/15/2019 11:22 AM     I have reviewed the above note, my prior note(s), appropriate imaging, and labs.  I have again discussed the risks and benefits of right upper extremity AV fistula creation, brachiocephalic with the patient.  All of their questions have been answered.  They understand and wish to proceed with surgical intervention. INR pending from today.    CBC  Results from last 7 days   Lab Units 11/10/19  1318   WBC 10*3/mm3 6.28   HEMOGLOBIN g/dL 11.7*   HEMATOCRIT % 39.7   PLATELETS 10*3/mm3 227       CMP  Results from last 7 days   Lab Units 11/10/19  1318   SODIUM mmol/L 137   POTASSIUM mmol/L 5.2   CHLORIDE mmol/L 102   CO2 mmol/L 20.0*   BUN mg/dL 70*   CREATININE mg/dL 5.28*   CALCIUM mg/dL 8.5*   BILIRUBIN mg/dL 0.3   ALK  PHOS U/L 140*   ALT (SGPT) U/L 8   AST (SGOT) U/L 10   GLUCOSE mg/dL 305*       Coagulation Cascade  PTT   Date Value Ref Range Status   09/30/2019 58.6 (H) 24.0 - 37.0 seconds Final     INR   Date Value Ref Range Status   11/07/2019 2.93 (H) 0.85 - 1.16 Final     Protime   Date Value Ref Range Status   11/07/2019 29.4 (H) 11.2 - 14.3 Seconds Final       No components found for: ECHO     Results for orders placed during the hospital encounter of 09/29/19   Adult Transthoracic Echo Complete W/ Cont if Necessary Per Protocol    Narrative · Estimated EF = 60%.  · Left ventricular systolic function is normal.

## 2019-11-15 NOTE — ANESTHESIA POSTPROCEDURE EVALUATION
Patient: Joy Bueno    Procedure Summary     Date:  11/15/19 Room / Location:   [facility] 04 /  [facility]    Anesthesia Start:  1320 Anesthesia Stop:      Procedure:  BRACHIOCEPHALIC AV FISTULA CREATION RIGHT (Right ) Diagnosis:      Surgeon:  Carlos Enrique Calderón MD Provider:  Shayne Patel MD    Anesthesia Type:  general with block ASA Status:  3          Anesthesia Type: general with block  Last vitals  BP   166/82 (11/15/19 1135) 158/70   Temp   98.1 °F (36.7 °C) (11/15/19 1135) 97.0   Pulse   72 (11/15/19 1135) 80   Resp   16 (11/15/19 1135)  17   SpO2   99 % (11/15/19 1135) 94%     Post Anesthesia Care and Evaluation    Patient location during evaluation: PACU  Patient participation: complete - patient participated  Level of consciousness: awake and alert  Pain score: 0  Pain management: adequate  Airway patency: patent  Anesthetic complications: No anesthetic complications  PONV Status: none  Cardiovascular status: hemodynamically stable and acceptable  Respiratory status: nonlabored ventilation, acceptable and nasal cannula  Hydration status: acceptable    Comments: Report to PACU RN @ bedside; VS stable

## 2019-11-15 NOTE — OP NOTE
General Surgery Operative Note    Joy Bueno  [id number]  1950    Date of Surgery:  11/15/2019 3:39 PM    Pre-op Diagnosis: Chronic renal failure    Post-op Diagnosis: Chronic renal failure    Procedure: Arteriovenous fistula creation, right brachiocephalic    Surgeon: Carlos Enrique Calderón MD    Assistant: None    Anesthesia: General    Fluids: 300 mL of crystalloid    Estimated Blood Loss: Less than 25 mL    Urine Voided: Not recorded     Drains: None    Implant: None    Findings: The right brachial artery was small with minimal calcification.                   The median cubital outflow vein was adequate.                   There was a noted thrill in the outflow vein.    Complications: None apparent.    History:   69-year-old lady with chronic renal failure requires a long-term institution of hemodialysis thus arteriovenous fistula creation.      I had a discussion regarding the risks and benefits of arteriovenous fistula creation, including but not limited to: bleeding, infection, injury to adjacent viscera (vessels and nerves), permanent neurovascular or neuromuscular deficits, a steal syndrome, non-maturation, chronic pain, need for re-intervention, distal arterial embolization, and medical issues from a cardiopulmonary and deep venous thrombosis standpoint.  All their questions were answered and they wish to proceed.     Procedure:      After informed consent the patient was taken to the operating room and placed in the supine position on the operating table.  Adequate cardiopulmonary monitoring was placed by anesthesia, the right upper extremity was prepped and draped in the standard sterile fashion, an ioban was placed on the skin, and a timeout was observed.     A transverse incision across the antecubital crease was created in the standard fashion.  I dissected down through the subcutaneous elements with the electrocautery Bovie.  The median cubital vein was encountered which was in continuity  with the cephalic vein.  This was relatively scarred to the surrounding tissue.  I circumferentially dissected the vein from the surrounding tissue sharply with Metzenbaums.  At this point I opened the brachial artery sheath medially. The artery was dissected circumferentially from the surrounding tissue and paired brachial veins.  I encircled the brachial artery with a vessel loop.  The artery was minimally calcified small but adequate for AV fistula creation.  The vein was circumferentially dissected free from the surrounding tissue for adequate length for a tension-free anastomosis.  I ligated the cephalic vein/median cubital vein distally as possible with a 2-0 silk tie and a vessel clip.  I instilled heparinized saline in the median cubital vein and  placed a small Diethrich clamp on the vein.  The artery was then clamped proximally and distally with bulldog clamps.  I created a venotomy with an 11 blade scalpel, extended this appropriately with Haywood scissors to match the aperture of our outflow vein, and used a 6-0 Prolene in a running fashion to perform the end to side anastomosis.  The system was appropriately de-aired and flushed.  All the clamps were then removed and the arterial anastomosis was tied down under full systemic arterial pressure.  Meticulous hemostasis was obtained.  5 mL of FloSeal was instilled into the wound.  This was then irrigated free with orthopedic irrigation.  The subcutaneous elements were reapproximated with 3-0 Vicryl.  The skin was closed with a running 4-0 subcuticular Monocryl.  Mastisol, Steri-Strips, and a coverderm were placed on the wound.     Sterile dressings were placed on the wound.  All lap and needle counts were correct at the end of the procedure ×2.  The patient was then transferred to the recovery room in stable condition.     Carlos Enrique Calderón MD     Date: 11/15/2019  Time: 3:39 PM

## 2019-11-15 NOTE — ANESTHESIA PROCEDURE NOTES
Peripheral Block      Patient reassessed immediately prior to procedure    Patient location during procedure: pre-op  Start time: 11/15/2019 11:50 AM  Stop time: 11/15/2019 11:55 AM  Reason for block: at surgeon's request and post-op pain management  Performed by  Anesthesiologist: Shayne Patel MD  Preanesthetic Checklist  Completed: patient identified, site marked, surgical consent, pre-op evaluation, timeout performed, IV checked, risks and benefits discussed and monitors and equipment checked  Prep:  Sterile barriers:cap, gloves, mask and sterile barriers  Prep: ChloraPrep  Patient monitoring: blood pressure monitoring, continuous pulse oximetry and EKG  Procedure  Sedation:yes  Performed under: local infiltration  Guidance:ultrasound guided  Images:still images obtained, printed/placed on chart    Block Type:interscalene  Injection Technique:catheter  Needle Type:Tuohy and echogenic  Needle Gauge:18 G  Resistance on Injection: none  Catheter Size:20 G (20g)    Medications Used: ropivacaine (NAROPIN) 0.5 % injection, 30 mL      Post Assessment  Injection Assessment: negative aspiration for heme, no paresthesia on injection and incremental injection  Patient Tolerance:comfortable throughout block  Complications:no  Additional Notes  Procedure:                 The pt was placed in semifowlers position with a slight tilt of the thorax contralateral to the insertion site.  The Insertion Site was prepped and draped in sterile fashion.  The pt was anesthetized with  IV Sedation( see meds) and  Skin and cutaneous tissue was infiltrated and anesthetized with 1% Lidocaine 3 mls via a 25g needle.  Utilizing ultrasound guidance, a BBraun 2 inch 18 g Contiplex echogenic touhy needle was advanced in-plane.  Hydro dissection of tissue was achieved with Normal saline. Major vessels(carotid and Internal Jugular) where visualized as the brachial plexus was approached at the approximate level of C-7/ T-1.  Cervical 5 and  Branches of Cervical 6 nerve roots where visualized and the needle tip was placed posterior at the level of C-6 roots.  LA spread was visualized and injection was made incrementally every 5 mls with aspiration. Injection pressure was normal or little, there was no intraneural injection, no vascular injection.      The BBraun 20 g wire stylet  catheter was then placed under US guidance on the posterior aspect of the Brachial Plexus.  Location of catheter was confirmed with NS injection visualized with US . The tuohy was then removed and the skin was sealed with Skin AFix at catheter insertion site.  Skin was prepped with mastisol and the labeled catheter  was secured with steristrips and a CHG tegaderm. Thank You.

## 2019-11-15 NOTE — ANESTHESIA PROCEDURE NOTES
Airway  Urgency: elective    Date/Time: 11/15/2019 1:35 PM  Airway not difficult    General Information and Staff    Patient location during procedure: OR  CRNA: Doris John CRNA    Indications and Patient Condition  Indications for airway management: airway protection    Preoxygenated: yes  MILS not maintained throughout  Mask difficulty assessment: 2 - vent by mask + OA or adjuvant +/- NMBA    Final Airway Details  Final airway type: endotracheal airway      Successful airway: ETT  Cuffed: yes   Successful intubation technique: video laryngoscopy (elective wilson )  Facilitating devices/methods: intubating stylet  Endotracheal tube insertion site: oral  Blade: Wilson  Blade size: 3  ETT size (mm): 7.5  Cormack-Lehane Classification: grade I - full view of glottis  Placement verified by: chest auscultation and capnometry   Measured from: lips  ETT/EBT  to lips (cm): 22  Number of attempts at approach: 1  Assessment: lips, teeth, and gum same as pre-op and atraumatic intubation    Additional Comments  Negative epigastric sounds, Breath sound equal bilaterally with symmetric chest rise and fall

## 2019-11-17 NOTE — PROGRESS NOTES
GARY Hayward    Nerve Cath Post Op Call    Patient Name: Joy Bueno  :  1950  MRN:  [id number]  Date of Discharge: 11/15/2019    Nerve Cath Post Op Call:    Catheter Plan:Patient called/No answer/Message left to call [facility] pain service for any questions or complaints

## 2019-11-20 NOTE — PROGRESS NOTES
GARY Hayward    Nerve Cath Post Op Call    Patient Name: Joy Bueno  :  1950  MRN:  [id number]  Date of Discharge: 11/15/2019    Nerve Cath Post Op Call:    Catheter Plan:Patient called/No answer/Message left to call [facility] pain service for any questions or complaints

## 2020-02-15 ENCOUNTER — APPOINTMENT (OUTPATIENT)
Dept: GENERAL RADIOLOGY | Facility: HOSPITAL | Age: 70
End: 2020-02-15

## 2020-02-15 ENCOUNTER — HOSPITAL ENCOUNTER (EMERGENCY)
Facility: HOSPITAL | Age: 70
Discharge: NURSING, REHAB OR LONG-TERM CARE | End: 2020-02-15
Attending: EMERGENCY MEDICINE | Admitting: EMERGENCY MEDICINE

## 2020-02-15 VITALS
RESPIRATION RATE: 20 BRPM | HEART RATE: 73 BPM | SYSTOLIC BLOOD PRESSURE: 136 MMHG | DIASTOLIC BLOOD PRESSURE: 69 MMHG | HEIGHT: 64 IN | OXYGEN SATURATION: 95 % | WEIGHT: 293 LBS | TEMPERATURE: 99.3 F | BODY MASS INDEX: 50.02 KG/M2

## 2020-02-15 DIAGNOSIS — L03.113 CELLULITIS OF ARM, RIGHT: ICD-10-CM

## 2020-02-15 DIAGNOSIS — Z92.89 HISTORY OF RENAL DIALYSIS: ICD-10-CM

## 2020-02-15 DIAGNOSIS — R73.9 HYPERGLYCEMIA: Primary | ICD-10-CM

## 2020-02-15 LAB
ALBUMIN SERPL-MCNC: 3.2 G/DL (ref 3.5–5.2)
ALBUMIN/GLOB SERPL: 0.8 G/DL
ALP SERPL-CCNC: 125 U/L (ref 39–117)
ALT SERPL W P-5'-P-CCNC: 8 U/L (ref 1–33)
ANION GAP SERPL CALCULATED.3IONS-SCNC: 13 MMOL/L (ref 5–15)
AST SERPL-CCNC: 10 U/L (ref 1–32)
BASOPHILS # BLD AUTO: 0.03 10*3/MM3 (ref 0–0.2)
BASOPHILS NFR BLD AUTO: 0.4 % (ref 0–1.5)
BILIRUB SERPL-MCNC: 0.3 MG/DL (ref 0.2–1.2)
BUN BLD-MCNC: 51 MG/DL (ref 8–23)
BUN/CREAT SERPL: 12.1 (ref 7–25)
CALCIUM SPEC-SCNC: 8.6 MG/DL (ref 8.6–10.5)
CHLORIDE SERPL-SCNC: 97 MMOL/L (ref 98–107)
CO2 SERPL-SCNC: 27 MMOL/L (ref 22–29)
CREAT BLD-MCNC: 4.22 MG/DL (ref 0.57–1)
DEPRECATED RDW RBC AUTO: 66.2 FL (ref 37–54)
EOSINOPHIL # BLD AUTO: 0.34 10*3/MM3 (ref 0–0.4)
EOSINOPHIL NFR BLD AUTO: 4.1 % (ref 0.3–6.2)
ERYTHROCYTE [DISTWIDTH] IN BLOOD BY AUTOMATED COUNT: 18 % (ref 12.3–15.4)
GFR SERPL CREATININE-BSD FRML MDRD: 13 ML/MIN/1.73
GFR SERPL CREATININE-BSD FRML MDRD: ABNORMAL ML/MIN/{1.73_M2}
GLOBULIN UR ELPH-MCNC: 4.1 GM/DL
GLUCOSE BLD-MCNC: 406 MG/DL (ref 65–99)
HCT VFR BLD AUTO: 33.7 % (ref 34–46.6)
HGB BLD-MCNC: 9.9 G/DL (ref 12–15.9)
IMM GRANULOCYTES # BLD AUTO: 0.05 10*3/MM3 (ref 0–0.05)
IMM GRANULOCYTES NFR BLD AUTO: 0.6 % (ref 0–0.5)
INR PPP: 1.71 (ref 0.85–1.16)
LYMPHOCYTES # BLD AUTO: 2.06 10*3/MM3 (ref 0.7–3.1)
LYMPHOCYTES NFR BLD AUTO: 24.6 % (ref 19.6–45.3)
MCH RBC QN AUTO: 29.6 PG (ref 26.6–33)
MCHC RBC AUTO-ENTMCNC: 29.4 G/DL (ref 31.5–35.7)
MCV RBC AUTO: 100.9 FL (ref 79–97)
MONOCYTES # BLD AUTO: 0.66 10*3/MM3 (ref 0.1–0.9)
MONOCYTES NFR BLD AUTO: 7.9 % (ref 5–12)
NEUTROPHILS # BLD AUTO: 5.23 10*3/MM3 (ref 1.7–7)
NEUTROPHILS NFR BLD AUTO: 62.4 % (ref 42.7–76)
NRBC BLD AUTO-RTO: 0 /100 WBC (ref 0–0.2)
PLATELET # BLD AUTO: 183 10*3/MM3 (ref 140–450)
PMV BLD AUTO: 10.8 FL (ref 6–12)
POTASSIUM BLD-SCNC: 4.3 MMOL/L (ref 3.5–5.2)
PROT SERPL-MCNC: 7.3 G/DL (ref 6–8.5)
PROTHROMBIN TIME: 19.3 SECONDS (ref 11.2–14.3)
RBC # BLD AUTO: 3.34 10*6/MM3 (ref 3.77–5.28)
SODIUM BLD-SCNC: 137 MMOL/L (ref 136–145)
WBC NRBC COR # BLD: 8.37 10*3/MM3 (ref 3.4–10.8)

## 2020-02-15 PROCEDURE — 73070 X-RAY EXAM OF ELBOW: CPT

## 2020-02-15 PROCEDURE — 96365 THER/PROPH/DIAG IV INF INIT: CPT

## 2020-02-15 PROCEDURE — 85610 PROTHROMBIN TIME: CPT | Performed by: EMERGENCY MEDICINE

## 2020-02-15 PROCEDURE — 99284 EMERGENCY DEPT VISIT MOD MDM: CPT

## 2020-02-15 PROCEDURE — 25010000002 VANCOMYCIN 10 G RECONSTITUTED SOLUTION: Performed by: EMERGENCY MEDICINE

## 2020-02-15 PROCEDURE — 85025 COMPLETE CBC W/AUTO DIFF WBC: CPT | Performed by: EMERGENCY MEDICINE

## 2020-02-15 PROCEDURE — 63710000001 INSULIN REGULAR HUMAN PER 5 UNITS: Performed by: EMERGENCY MEDICINE

## 2020-02-15 PROCEDURE — 80053 COMPREHEN METABOLIC PANEL: CPT | Performed by: EMERGENCY MEDICINE

## 2020-02-15 PROCEDURE — 96366 THER/PROPH/DIAG IV INF ADDON: CPT

## 2020-02-15 RX ORDER — SODIUM CHLORIDE 0.9 % (FLUSH) 0.9 %
10 SYRINGE (ML) INJECTION AS NEEDED
Status: DISCONTINUED | OUTPATIENT
Start: 2020-02-15 | End: 2020-02-15 | Stop reason: HOSPADM

## 2020-02-15 RX ORDER — OXYCODONE HYDROCHLORIDE AND ACETAMINOPHEN 5; 325 MG/1; MG/1
1 TABLET ORAL ONCE
Status: COMPLETED | OUTPATIENT
Start: 2020-02-15 | End: 2020-02-15

## 2020-02-15 RX ORDER — HYDROCODONE BITARTRATE AND ACETAMINOPHEN 5; 325 MG/1; MG/1
1 TABLET ORAL EVERY 4 HOURS PRN
Qty: 10 TABLET | Refills: 0 | Status: SHIPPED | OUTPATIENT
Start: 2020-02-15 | End: 2020-07-08 | Stop reason: HOSPADM

## 2020-02-15 RX ORDER — CEPHALEXIN 500 MG/1
500 CAPSULE ORAL 4 TIMES DAILY
Qty: 40 CAPSULE | Refills: 0 | Status: SHIPPED | OUTPATIENT
Start: 2020-02-15 | End: 2020-02-25

## 2020-02-15 RX ADMIN — OXYCODONE HYDROCHLORIDE AND ACETAMINOPHEN 1 TABLET: 5; 325 TABLET ORAL at 02:39

## 2020-02-15 RX ADMIN — INSULIN HUMAN 5 UNITS: 100 INJECTION, SOLUTION PARENTERAL at 04:58

## 2020-02-15 RX ADMIN — VANCOMYCIN HYDROCHLORIDE 2750 MG: 10 INJECTION, POWDER, LYOPHILIZED, FOR SOLUTION INTRAVENOUS at 02:39

## 2020-02-15 NOTE — ED PROVIDER NOTES
"    EMERGENCY DEPARTMENT ENCOUNTER      Pt Name: Joy Bueno  MRN: [id number]  YOB: 1950  Date of evaluation: 2/15/2020  Provider: Kirt Ley DO    CHIEF COMPLAINT       Chief Complaint   Patient presents with   • Arm Pain         HISTORY OF PRESENT ILLNESS  (Location/Symptom, Timing/Onset, Context/Setting, Quality, Duration, Modifying Factors, Severity.)   Joy Bueno is a 69 y.o. female who presents to the emergency department with complaints of right arm pain and swelling. The patient reports her discomfort has been ongoing since the technician put \"the wrong needle,\" in her arm while at dialysis two days ago.  Patient notes she has a fistula in this arm which should continue to work.  Also has a right chest port catheter.  Since then, the pain and swelling has worsened, which prompted her visit to the emergency department. Additionally, she denies any injury or trauma to cause her symptoms. She has history of a deep vein thrombosis to her left arm. There are no other complaints at this time.     Nursing notes were reviewed.    REVIEW OF SYSTEMS    (2-9 systems for level 4, 10 or more for level 5)   ROS:  General:  No fevers, no chills, no weakness  Cardiovascular:  No chest pain, no palpitations  Respiratory:  No shortness of breath, no cough, no wheezing  Gastrointestinal:  No pain, no nausea, no vomiting, no diarrhea  Musculoskeletal:  No muscle pain, no joint pain, +right arm pain  Skin:  No rash, no easy bruising  Neurologic:  No speech problems, no headache, no extremity numbness, no extremity tingling, no extremity weakness  Psychiatric:  No anxiety  Genitourinary:  No dysuria, no hematuria    Except as noted above the remainder of the review of systems was reviewed and negative.       PAST MEDICAL HISTORY     Past Medical History:   Diagnosis Date   • Acute on chronic diastolic heart failure (CMS/HCC)    • Acute on chronic heart failure (CMS/HCC)    • Acute respiratory " failure with hypercapnia (CMS/HCC) 4/27/2019   • Anemia    • Anxiety    • Asthma    • Bilateral leg pain 10/4/2017   • Boil     on head    • Chest wall abscess 4/5/2019   • CHF (congestive heart failure) (CMS/HCC)    • Chronic diastolic heart failure (CMS/HCC)    • Chronic kidney disease     stage 4   • Constipation    • Coronary artery disease    • Diabetes mellitus (CMS/HCC)     checks sugar once per day    • Diabetes mellitus, type II (CMS/HCC) 8/3/2018   • Dialysis patient (CMS/HCC)     mwf   • Dizzy    • DVT of upper extremity (deep vein thrombosis) (CMS/HCC)     left   • Elevated troponin 9/1/2019   • Encephalopathy, metabolic 4/27/2019   • Fecal impaction of rectum (CMS/HCC)    • Generalized weakness 7/31/2019   • GERD (gastroesophageal reflux disease)    • Headache    • History of Clostridium difficile infection 08/03/2018    h/o   • History of respiratory failure, since off home oxygen 8/3/2018   • History of UTI 8/3/2018   • Hyperkalemia 7/31/2019   • Hypertension    • Morbid obesity (CMS/HCC)    • Osteoarthritis    • Sleep apnea     doesnt use machine     • Slow transit constipation 2/20/2019   • Tinea capitis 8/3/2018   • URI (upper respiratory infection) 4/5/2019   • Urinary tract infection due to extended-spectrum beta lactamase (ESBL)-producing Klebsiella 2/20/2019   • Urinary tract infection without hematuria 9/1/2019   • UTI (urinary tract infection), bacterial 10/4/2017   • Vertigo    • Volume overload 4/6/2019         SURGICAL HISTORY       Past Surgical History:   Procedure Laterality Date   • ARM LESION/CYST EXCISION N/A 8/22/2019    Procedure: ABSCESS DRAINAGE X2 ON BACK;  Surgeon: Carlos Enrique Calderón MD;  Location:  [facility];  Service: General   • ARTERIOVENOUS FISTULA/SHUNT SURGERY Left 10/22/2018    Procedure: LEFT UPPER EXTREMITY ARTERIOVENOUS FISTULA CREATION;  Surgeon: Carlos Enrique Calderón MD;  Location:  [facility];  Service: Vascular   • ARTERIOVENOUS FISTULA/SHUNT SURGERY Right  11/15/2019    Procedure: BRACHIOCEPHALIC AV FISTULA CREATION RIGHT;  Surgeon: Carlos Enrique Calderón MD;  Location:  [facility];  Service: Vascular   • CATARACT EXTRACTION      bilat    •  SECTION     • COLONOSCOPY N/A 2017    Procedure: COLONOSCOPY;  Surgeon: Mark I Brunner, MD;  Location:  [facility];  Service:    • ENDOSCOPY N/A 2017    Procedure: ESOPHAGOGASTRODUODENOSCOPY ;  Surgeon: Velasquez Call MD;  Location:  [facility];  Service:    • HERNIA REPAIR      umbilical hernia unsure about mesh    • INSERTION HEMODIALYSIS CATHETER Right 10/17/2018    Procedure: HEMODIALYSIS CATHETER INSERTION;  Surgeon: Carlos Enrique Calderón MD;  Location:  [facility];  Service: General   • TONSILLECTOMY           CURRENT MEDICATIONS       Current Facility-Administered Medications:   •  insulin regular (humuLIN R,novoLIN R) injection 5 Units, 5 Units, Subcutaneous, Once, Kirt Ley DO  •  [COMPLETED] Insert peripheral IV, , , Once **AND** sodium chloride 0.9 % flush 10 mL, 10 mL, Intravenous, PRN, Kirt Ley DO  •  vancomycin 2750 mg/500 mL 0.9% NS IVPB ([facility]), 20 mg/kg, Intravenous, Once, Kirt Ley DO, 2,750 mg at 02/15/20 0239    Current Outpatient Medications:   •  acetaminophen (TYLENOL) 325 MG tablet, Take 650 mg by mouth Every 6 (Six) Hours As Needed for Mild Pain ., Disp: , Rfl:   •  albuterol (PROVENTIL HFA;VENTOLIN HFA) 108 (90 BASE) MCG/ACT inhaler, Inhale 2 puffs Every 4 (Four) Hours As Needed for Wheezing., Disp: , Rfl:   •  aspirin 81 MG chewable tablet, Chew 1 tablet Daily., Disp: 30 tablet, Rfl: 0  •  atorvastatin (LIPITOR) 80 MG tablet, Take 1 tablet by mouth Every Night., Disp: 30 tablet, Rfl: 0  •  calcitriol (ROCALTROL) 0.25 MCG capsule, Take 0.25 mcg by mouth Daily., Disp: , Rfl:   •  carvedilol (COREG) 25 MG tablet, Take 25 mg by mouth 2 (Two) Times a Day With Meals., Disp: , Rfl:   •  cephalexin (KEFLEX) 500 MG capsule, Take 1 capsule by mouth  4 (Four) Times a Day for 10 days., Disp: 40 capsule, Rfl: 0  •  Cetirizine HCl (ZYRTEC ALLERGY) 10 MG capsule, Take 10 mg by mouth Daily., Disp: , Rfl:   •  diltiaZEM (CARDIZEM) 2% cream, Apply  topically to the appropriate area as directed 3 (Three) Times a Day As Needed (Mix w/Lidocaine 2% oint and apply TID PRN to anal fissure)., Disp: , Rfl:   •  docusate sodium (COLACE) 100 MG capsule, Take 1 capsule by mouth 2 (Two) Times a Day., Disp: , Rfl:   •  fluticasone (FLONASE) 50 MCG/ACT nasal spray, 2 sprays into the nostril(s) as directed by provider Daily., Disp: , Rfl:   •  fluticasone-salmeterol (ADVAIR) 500-50 MCG/DOSE DISKUS, Inhale 1 puff 2 (Two) Times a Day., Disp: , Rfl:   •  HYDROcodone-acetaminophen (NORCO) 5-325 MG per tablet, Take 1 tablet by mouth Every 8 (Eight) Hours As Needed for Moderate Pain  (Pain)., Disp: 10 tablet, Rfl: 0  •  HYDROcodone-acetaminophen (NORCO) 5-325 MG per tablet, Take 1 tablet by mouth Every 4 (Four) Hours As Needed for Moderate Pain ., Disp: 10 tablet, Rfl: 0  •  insulin aspart (novoLOG FLEXPEN) 100 UNIT/ML solution pen-injector sc pen, Inject 5 Units under the skin into the appropriate area as directed 3 (Three) Times a Day With Meals., Disp: , Rfl:   •  ipratropium-albuterol (DUO-NEB) 0.5-2.5 mg/3 ml nebulizer, Take 3 mL by nebulization Every 6 (Six) Hours As Needed for Wheezing., Disp: , Rfl:   •  isosorbide mononitrate (IMDUR) 30 MG 24 hr tablet, Take 30 mg by mouth Daily., Disp: , Rfl:   •  Lidocaine 2 % gel, Apply  topically 3 (Three) Times a Day As Needed (Mix w/LDiltiazem 2% cream and apply TID PRN to anal fissure)., Disp: , Rfl:   •  meclizine (ANTIVERT) 12.5 MG tablet, Take 1 tablet by mouth 3 (Three) Times a Day As Needed for dizziness., Disp: 15 tablet, Rfl: 0  •  melatonin 3 MG tablet, Take 3 mg by mouth Every Night., Disp: , Rfl:   •  Multiple Vitamin (TAB-A-STAR) tablet, Take 1 tablet by mouth Daily., Disp: , Rfl:   •  neomycin-polymyxin-hydrocortisone  (CORTISPORIN) 3.5-36909-0 otic solution, Administer 3 drops into both ears 3 (Three) Times a Day., Disp: , Rfl:   •  ondansetron (ZOFRAN) 4 MG tablet, Take 4 mg by mouth Every 8 (Eight) Hours As Needed for Nausea or Vomiting., Disp: , Rfl:   •  pantoprazole (PROTONIX) 40 MG EC tablet, Take 40 mg by mouth Daily., Disp: , Rfl:   •  phenylephrine-cocoa Butter (PREPARATION H) 0.25-88.44 % suppository suppository, Insert  into the rectum As Needed for Hemorrhoids., Disp: , Rfl:   •  polyethylene glycol (MIRALAX) pack packet, Take 17 g by mouth 2 (Two) Times a Day., Disp: , Rfl:   •  promethazine (PHENERGAN) 25 MG tablet, Take 25 mg by mouth 3 (Three) Times a Week. On dialysis days (), Disp: , Rfl:   •  saccharomyces boulardii (FLORASTOR) 250 MG capsule, Take 250 mg by mouth Daily., Disp: , Rfl:   •  sertraline (ZOLOFT) 50 MG tablet, Take 50 mg by mouth Daily., Disp: , Rfl:   •  sevelamer (RENVELA) 800 MG tablet, Take 800 mg by mouth 3 (Three) Times a Day With Meals. 0500, 1200 & 1700, Disp: , Rfl:   •  vitamin D (ERGOCALCIFEROL) 31368 units capsule capsule, Take 50,000 Units by mouth 1 (One) Time Per Week. TAKES ON , Disp: , Rfl:   •  warfarin (COUMADIN) 6 MG tablet, Take 6 mg by mouth Daily. Ld 3 days before per [facility] office, Disp: , Rfl:     ALLERGIES     Geodon [ziprasidone hcl]; Haldol [haloperidol lactate]; and Seroquel [quetiapine fumarate]    FAMILY HISTORY       Family History   Problem Relation Age of Onset   • Cardiomyopathy Mother    • Stroke Father    • Diabetes Father           SOCIAL HISTORY       Social History     Socioeconomic History   • Marital status:      Spouse name: Not on file   • Number of children: Not on file   • Years of education: Not on file   • Highest education level: Not on file   Tobacco Use   • Smoking status: Former Smoker     Packs/day: 0.05     Types: Cigarettes     Last attempt to quit:      Years since quittin.1   • Smokeless tobacco: Never  "Used   • Tobacco comment: smoked since high school but didnt smoke much and unsure when quit fully    Substance and Sexual Activity   • Alcohol use: No   • Drug use: No   • Sexual activity: Defer   Social History Narrative    Mrs. Bueno is a 68 year old AA  female. She lives alone in [place] but has been in rehab for some time. She has a daughter. She does not have an advanced directive.         PHYSICAL EXAM    (up to 7 for level 4, 8 or more for level 5)     Vitals:    02/15/20 0018 02/15/20 0023   BP:  117/66   Pulse:  81   Resp:  22   Temp:  99.3 °F (37.4 °C)   TempSrc:  Oral   SpO2:  93%   Weight: 136 kg (300 lb)    Height: 162.6 cm (64\")        Physical Exam  General :Patient is awake, alert, oriented, in no acute distress, nontoxic appearing, morbidly obese  HEENT: Pupils are equally round and reactive to light, EOMI, conjunctivae clear, sclerae white, there is no injection no icterus.  Oral mucosa is moist, no exudate. Uvula is midline  Neck: Neck is supple, full range of motion, trachea midline  Cardiac: Heart regular rate, rhythm, no murmurs, rubs, or gallops  Lungs: There is no wheezing, rhonchi, or rales. There is no use of accessory muscles. Decreased breath sounds bilaterally.   Chest wall: There is no tenderness to palpation over the chest wall or over ribs  Abdomen: Abdomen is soft, nontender, nondistended. There is no firm or pulsatile masses, no rebound rigidity or guarding. Morbidly obese.   Musculoskeletal: No focal muscle deficits are appreciated. Right arm at the antecubital fossa with palpable bruit fistula. Just lateral to the antecubital fossa, there is an area of ecchymosis. Induration and discomfort with palpation to the area. No palpable loculation or drainage. Distal pulses intact.   Neuro: Motor intact, sensory intact, level of consciousness is normal  Dermatology: Skin is warm and dry. Right anterior chest port in place and is clean, dry, and intact.   Psych: Mentation is " grossly normal, cognition is grossly normal. Affect is appropriate.      DIAGNOSTIC RESULTS     EKG: All EKG's are interpreted by the Emergency Department Physician who either signs or Co-signs this chart in the absence of a cardiologist.    No orders to display       RADIOLOGY:   Non-plain film images such as CT, Ultrasound and MRI are read by the radiologist. Plain radiographic images are visualized and preliminarily interpreted by the emergency physician with the below findings:      [] Radiologist's Report Reviewed:  XR Elbow 2 View Right   Final Result   No acute findings      Signer Name: Rigoberto Shaikh MD    Signed: 2/15/2020 1:18 AM    Workstation Name: [email or web address]     [facility]            ED BEDSIDE ULTRASOUND:   Performed by ED Physician - none    LABS:    I have reviewed and interpreted all of the currently available lab results from this visit (if applicable):  Results for orders placed or performed during the hospital encounter of 02/15/20   Comprehensive Metabolic Panel   Result Value Ref Range    Glucose 406 (C) 65 - 99 mg/dL    BUN 51 (H) 8 - 23 mg/dL    Creatinine 4.22 (H) 0.57 - 1.00 mg/dL    Sodium 137 136 - 145 mmol/L    Potassium 4.3 3.5 - 5.2 mmol/L    Chloride 97 (L) 98 - 107 mmol/L    CO2 27.0 22.0 - 29.0 mmol/L    Calcium 8.6 8.6 - 10.5 mg/dL    Total Protein 7.3 6.0 - 8.5 g/dL    Albumin 3.20 (L) 3.50 - 5.20 g/dL    ALT (SGPT) 8 1 - 33 U/L    AST (SGOT) 10 1 - 32 U/L    Alkaline Phosphatase 125 (H) 39 - 117 U/L    Total Bilirubin 0.3 0.2 - 1.2 mg/dL    eGFR Non  Amer      eGFR  African Amer 13 (L) >60 mL/min/1.73    Globulin 4.1 gm/dL    A/G Ratio 0.8 g/dL    BUN/Creatinine Ratio 12.1 7.0 - 25.0    Anion Gap 13.0 5.0 - 15.0 mmol/L   Protime-INR   Result Value Ref Range    Protime 19.3 (H) 11.2 - 14.3 Seconds    INR 1.71 (H) 0.85 - 1.16   CBC Auto Differential   Result Value Ref Range    WBC 8.37 3.40 - 10.80 10*3/mm3    RBC 3.34 (L) 3.77 - 5.28 10*6/mm3     "Hemoglobin 9.9 (L) 12.0 - 15.9 g/dL    Hematocrit 33.7 (L) 34.0 - 46.6 %    .9 (H) 79.0 - 97.0 fL    MCH 29.6 26.6 - 33.0 pg    MCHC 29.4 (L) 31.5 - 35.7 g/dL    RDW 18.0 (H) 12.3 - 15.4 %    RDW-SD 66.2 (H) 37.0 - 54.0 fl    MPV 10.8 6.0 - 12.0 fL    Platelets 183 140 - 450 10*3/mm3    Neutrophil % 62.4 42.7 - 76.0 %    Lymphocyte % 24.6 19.6 - 45.3 %    Monocyte % 7.9 5.0 - 12.0 %    Eosinophil % 4.1 0.3 - 6.2 %    Basophil % 0.4 0.0 - 1.5 %    Immature Grans % 0.6 (H) 0.0 - 0.5 %    Neutrophils, Absolute 5.23 1.70 - 7.00 10*3/mm3    Lymphocytes, Absolute 2.06 0.70 - 3.10 10*3/mm3    Monocytes, Absolute 0.66 0.10 - 0.90 10*3/mm3    Eosinophils, Absolute 0.34 0.00 - 0.40 10*3/mm3    Basophils, Absolute 0.03 0.00 - 0.20 10*3/mm3    Immature Grans, Absolute 0.05 0.00 - 0.05 10*3/mm3    nRBC 0.0 0.0 - 0.2 /100 WBC        All other labs were within normal range or not returned as of this dictation.      EMERGENCY DEPARTMENT COURSE and DIFFERENTIAL DIAGNOSIS/MDM:   Vitals:    Vitals:    02/15/20 0018 02/15/20 0023   BP:  117/66   Pulse:  81   Resp:  22   Temp:  99.3 °F (37.4 °C)   TempSrc:  Oral   SpO2:  93%   Weight: 136 kg (300 lb)    Height: 162.6 cm (64\")         !    Patient with right arm, soft tissue injury she is a dialysis patient, has a fistula in the right antecubital region but still has good bruit/thrill, just lateral to this where she states she was stuck a few days ago while she was attempting to receive her dialysis, has pain overlying this area.  Distal neurovascular status is complete and intact, but there is an area of induration which is sore to touch around this region.  Concern for inflammatory, early infectious process we obtain IV, labs and imaging.  Patient was given antibiotics, pain control while in the ED. on reevaluation patient is resting comfortably.  I updated her on his results, we discussed the possible inflammatory versus infectious area around the arm which we will continue to " treat symptomatically, will start her on Keflex antibiotics, also given her hyperglycemia we did give her dose of insulin in the ED, she has sliding scale insulin at home which she should continue to use and monitor closely.  She will follow-up with her PCP, nephrology specialist for reevaluation, recheck of her arm and site to make sure it continues to improve.  She has any worsening symptoms in the meantime, she is to return back to the emergency department for reevaluation.  Patient feeling well, understands to return precautions discussed. The patient will follow-up with their PCP in 1-2 days for reevaluation.  If the patient or family members have any further concerns or patient has any worsening symptoms they will return to the ED for reevaluation.      MEDICATIONS ADMINISTERED IN ED:  Medications   sodium chloride 0.9 % flush 10 mL (has no administration in time range)   vancomycin 2750 mg/500 mL 0.9% NS IVPB ([facility]) (2,750 mg Intravenous New Bag 2/15/20 0239)   insulin regular (humuLIN R,novoLIN R) injection 5 Units (has no administration in time range)   oxyCODONE-acetaminophen (PERCOCET) 5-325 MG per tablet 1 tablet (1 tablet Oral Given 2/15/20 0239)       PROCEDURES:  Procedures    CRITICAL CARE TIME    Total Critical Care time was 0 minutes, excluding separately reportable procedures.   There was a high probability of clinically significant/life threatening deterioration in the patient's condition which required my urgent intervention.      FINAL IMPRESSION      1. Hyperglycemia    2. Cellulitis of arm, right    3. History of renal dialysis          DISPOSITION/PLAN     ED Disposition     ED Disposition Condition Comment    Discharge Stable           PATIENT REFERRED TO:  Goldy Dior MD  [address] [place] [address] [phone]    In 2 days  For wound re-check    [facility] Emergency Department  [address] [address] [phone]    If  symptoms worsen      DISCHARGE MEDICATIONS:     Medication List      START taking these medications    cephalexin 500 MG capsule  Commonly known as:  KEFLEX  Take 1 capsule by mouth 4 (Four) Times a Day for 10 days.        CHANGE how you take these medications    * HYDROcodone-acetaminophen 5-325 MG per tablet  Commonly known as:  NORCO  Take 1 tablet by mouth Every 8 (Eight) Hours As Needed for Moderate Pain    (Pain).  What changed:  Another medication with the same name was changed. Make   sure you understand how and when to take each.     * HYDROcodone-acetaminophen 5-325 MG per tablet  Commonly known as:  NORCO  Take 1 tablet by mouth Every 4 (Four) Hours As Needed for Moderate Pain .  What changed:    when to take this  reasons to take this         * This list has 2 medication(s) that are the same as other medications   prescribed for you. Read the directions carefully, and ask your doctor or   other care provider to review them with you.            CONTINUE taking these medications    acetaminophen 325 MG tablet  Commonly known as:  TYLENOL     albuterol sulfate  (90 Base) MCG/ACT inhaler  Commonly known as:  PROVENTIL HFA;VENTOLIN HFA;PROAIR HFA     aspirin 81 MG chewable tablet  Chew 1 tablet Daily.     atorvastatin 80 MG tablet  Commonly known as:  LIPITOR  Take 1 tablet by mouth Every Night.     calcitriol 0.25 MCG capsule  Commonly known as:  ROCALTROL     carvedilol 25 MG tablet  Commonly known as:  COREG     diltiaZEM (CARDIZEM) 2% cream     docusate sodium 100 MG capsule  Commonly known as:  COLACE  Take 1 capsule by mouth 2 (Two) Times a Day.     fluticasone 50 MCG/ACT nasal spray  Commonly known as:  FLONASE     fluticasone-salmeterol 500-50 MCG/DOSE DISKUS  Commonly known as:  ADVAIR     insulin aspart 100 UNIT/ML solution pen-injector sc pen  Commonly known as:  novoLOG FLEXPEN     ipratropium-albuterol 0.5-2.5 mg/3 ml nebulizer  Commonly known as:  DUO-NEB     isosorbide mononitrate 30 MG  24 hr tablet  Commonly known as:  IMDUR     Lidocaine 2 % gel     meclizine 12.5 MG tablet  Commonly known as:  ANTIVERT  Take 1 tablet by mouth 3 (Three) Times a Day As Needed for dizziness.     melatonin 3 MG tablet     neomycin-polymyxin-hydrocortisone 3.5-72784-6 otic solution  Commonly known as:  CORTISPORIN     ondansetron 4 MG tablet  Commonly known as:  ZOFRAN     pantoprazole 40 MG EC tablet  Commonly known as:  PROTONIX     polyethylene glycol pack packet  Commonly known as:  MIRALAX     PREPARATION H 0.25-88.44 % suppository suppository  Generic drug:  phenylephrine-cocoa Butter     promethazine 25 MG tablet  Commonly known as:  PHENERGAN     saccharomyces boulardii 250 MG capsule  Commonly known as:  FLORASTOR     sertraline 50 MG tablet  Commonly known as:  ZOLOFT     sevelamer 800 MG tablet  Commonly known as:  RENVELA     TAB-A-STAR tablet     vitamin D 1.25 MG (37571 UT) capsule capsule  Commonly known as:  ERGOCALCIFEROL     warfarin 6 MG tablet  Commonly known as:  COUMADIN     ZYRTEC ALLERGY 10 MG capsule  Generic drug:  Cetirizine HCl            Documentation assistance provided by Morris Redd acting as scribe for Dr. Kirt Ley.     The scribe's documentation has been prepared under my direction and personally reviewed by me in its entirety.  I confirm that the note above accurately reflects all work, treatment, procedures, and medical decision making performed by me.      Comment: Please note this report has been produced using speech recognition software.      Kirt Ley DO  Attending Emergency Physician                Morris Redd  02/15/20 0040       Kirt Ley DO  02/15/20 0358

## 2020-02-15 NOTE — DISCHARGE INSTRUCTIONS
Please take your medications as prescribed, follow your blood sugars closely, use your sliding scale insulin and adjust as needed.  Make sure you take your medications including antibiotics, keep a close watch on your right arm with your PCP and nephrology specialist to make sure the area continues to heal.  If it worsens or you have any increased pain or any further concerns, return back to the emergency department/hospital for reevaluation.

## 2020-06-29 ENCOUNTER — APPOINTMENT (OUTPATIENT)
Dept: CT IMAGING | Facility: HOSPITAL | Age: 70
End: 2020-06-29

## 2020-06-29 ENCOUNTER — APPOINTMENT (OUTPATIENT)
Dept: NEPHROLOGY | Facility: HOSPITAL | Age: 70
End: 2020-06-29

## 2020-06-29 ENCOUNTER — HOSPITAL ENCOUNTER (INPATIENT)
Facility: HOSPITAL | Age: 70
LOS: 9 days | Discharge: NURSING, REHAB OR LONG-TERM CARE | End: 2020-07-08
Attending: EMERGENCY MEDICINE | Admitting: INTERNAL MEDICINE

## 2020-06-29 DIAGNOSIS — R04.0 EPISTAXIS: Primary | ICD-10-CM

## 2020-06-29 DIAGNOSIS — S30.810A EXCORIATION OF BUTTOCK, INITIAL ENCOUNTER: ICD-10-CM

## 2020-06-29 DIAGNOSIS — R91.8 BILATERAL PULMONARY INFILTRATES ON CXR: ICD-10-CM

## 2020-06-29 DIAGNOSIS — Z99.2 ACUTE RENAL FAILURE ON DIALYSIS (HCC): ICD-10-CM

## 2020-06-29 DIAGNOSIS — N17.9 ACUTE RENAL FAILURE ON DIALYSIS (HCC): ICD-10-CM

## 2020-06-29 DIAGNOSIS — Z91.15 NONCOMPLIANCE WITH RENAL DIALYSIS: ICD-10-CM

## 2020-06-29 DIAGNOSIS — R04.2 HEMOPTYSIS: ICD-10-CM

## 2020-06-29 DIAGNOSIS — U07.1 COVID-19: ICD-10-CM

## 2020-06-29 DIAGNOSIS — R19.5 GUAIAC POSITIVE STOOLS: ICD-10-CM

## 2020-06-29 PROBLEM — K64.9 HEMORRHOIDS: Status: ACTIVE | Noted: 2020-06-29

## 2020-06-29 PROBLEM — J96.01 ACUTE RESPIRATORY FAILURE WITH HYPOXIA (HCC): Status: ACTIVE | Noted: 2017-09-02

## 2020-06-29 PROBLEM — J18.9 PNEUMONIA: Status: ACTIVE | Noted: 2020-06-29

## 2020-06-29 LAB
ALBUMIN SERPL-MCNC: 3.5 G/DL (ref 3.5–5.2)
ALBUMIN/GLOB SERPL: 0.8 G/DL
ALP SERPL-CCNC: 94 U/L (ref 39–117)
ALT SERPL W P-5'-P-CCNC: 10 U/L (ref 1–33)
ANION GAP SERPL CALCULATED.3IONS-SCNC: 15 MMOL/L (ref 5–15)
AST SERPL-CCNC: 15 U/L (ref 1–32)
B PARAPERT DNA SPEC QL NAA+PROBE: NOT DETECTED
B PERT DNA SPEC QL NAA+PROBE: NOT DETECTED
BACTERIA UR QL AUTO: ABNORMAL /HPF
BASOPHILS # BLD AUTO: 0.03 10*3/MM3 (ref 0–0.2)
BASOPHILS NFR BLD AUTO: 0.4 % (ref 0–1.5)
BILIRUB SERPL-MCNC: 0.5 MG/DL (ref 0.2–1.2)
BILIRUB UR QL STRIP: NEGATIVE
BUN BLD-MCNC: 88 MG/DL (ref 8–23)
BUN/CREAT SERPL: 12.3 (ref 7–25)
C PNEUM DNA NPH QL NAA+NON-PROBE: NOT DETECTED
CALCIUM SPEC-SCNC: 7.7 MG/DL (ref 8.6–10.5)
CHLORIDE SERPL-SCNC: 98 MMOL/L (ref 98–107)
CLARITY UR: ABNORMAL
CO2 SERPL-SCNC: 27 MMOL/L (ref 22–29)
COARSE GRAN CASTS URNS QL MICRO: ABNORMAL /LPF
COLOR UR: ABNORMAL
CREAT BLD-MCNC: 7.17 MG/DL (ref 0.57–1)
CRP SERPL-MCNC: 2.2 MG/DL (ref 0–0.5)
D-LACTATE SERPL-SCNC: 0.8 MMOL/L (ref 0.5–2)
DEPRECATED RDW RBC AUTO: 61.2 FL (ref 37–54)
DEVELOPER EXPIRATION DATE: ABNORMAL
DEVELOPER LOT NUMBER: ABNORMAL
EOSINOPHIL # BLD AUTO: 0.51 10*3/MM3 (ref 0–0.4)
EOSINOPHIL NFR BLD AUTO: 7.1 % (ref 0.3–6.2)
ERYTHROCYTE [DISTWIDTH] IN BLOOD BY AUTOMATED COUNT: 16.7 % (ref 12.3–15.4)
EXPIRATION DATE: ABNORMAL
FECAL OCCULT BLOOD SCREEN, POC: POSITIVE
FERRITIN SERPL-MCNC: 1646 NG/ML (ref 13–150)
FLUAV H1 2009 PAND RNA NPH QL NAA+PROBE: NOT DETECTED
FLUAV H1 HA GENE NPH QL NAA+PROBE: NOT DETECTED
FLUAV H3 RNA NPH QL NAA+PROBE: NOT DETECTED
FLUAV SUBTYP SPEC NAA+PROBE: NOT DETECTED
FLUBV RNA ISLT QL NAA+PROBE: NOT DETECTED
GFR SERPL CREATININE-BSD FRML MDRD: 7 ML/MIN/1.73
GFR SERPL CREATININE-BSD FRML MDRD: ABNORMAL ML/MIN/{1.73_M2}
GLOBULIN UR ELPH-MCNC: 4.3 GM/DL
GLUCOSE BLD-MCNC: 161 MG/DL (ref 65–99)
GLUCOSE BLDC GLUCOMTR-MCNC: 111 MG/DL (ref 70–130)
GLUCOSE BLDC GLUCOMTR-MCNC: 132 MG/DL (ref 70–130)
GLUCOSE UR STRIP-MCNC: ABNORMAL MG/DL
HADV DNA SPEC NAA+PROBE: NOT DETECTED
HCOV 229E RNA SPEC QL NAA+PROBE: NOT DETECTED
HCOV HKU1 RNA SPEC QL NAA+PROBE: NOT DETECTED
HCOV NL63 RNA SPEC QL NAA+PROBE: NOT DETECTED
HCOV OC43 RNA SPEC QL NAA+PROBE: NOT DETECTED
HCT VFR BLD AUTO: 26.6 % (ref 34–46.6)
HCT VFR BLD AUTO: 36.4 % (ref 34–46.6)
HGB BLD-MCNC: 10.8 G/DL (ref 12–15.9)
HGB BLD-MCNC: 7.9 G/DL (ref 12–15.9)
HGB UR QL STRIP.AUTO: ABNORMAL
HMPV RNA NPH QL NAA+NON-PROBE: NOT DETECTED
HPIV1 RNA SPEC QL NAA+PROBE: NOT DETECTED
HPIV2 RNA SPEC QL NAA+PROBE: NOT DETECTED
HPIV3 RNA NPH QL NAA+PROBE: NOT DETECTED
HPIV4 P GENE NPH QL NAA+PROBE: NOT DETECTED
HYALINE CASTS UR QL AUTO: ABNORMAL /LPF
IMM GRANULOCYTES # BLD AUTO: 0.04 10*3/MM3 (ref 0–0.05)
IMM GRANULOCYTES NFR BLD AUTO: 0.6 % (ref 0–0.5)
INR PPP: 3.97 (ref 0.85–1.16)
KETONES UR QL STRIP: NEGATIVE
LDH SERPL-CCNC: 306 U/L (ref 135–214)
LEUKOCYTE ESTERASE UR QL STRIP.AUTO: ABNORMAL
LYMPHOCYTES # BLD AUTO: 1.08 10*3/MM3 (ref 0.7–3.1)
LYMPHOCYTES NFR BLD AUTO: 15.1 % (ref 19.6–45.3)
Lab: ABNORMAL
M PNEUMO IGG SER IA-ACNC: NOT DETECTED
MCH RBC QN AUTO: 29.8 PG (ref 26.6–33)
MCHC RBC AUTO-ENTMCNC: 29.7 G/DL (ref 31.5–35.7)
MCV RBC AUTO: 100.3 FL (ref 79–97)
MONOCYTES # BLD AUTO: 0.35 10*3/MM3 (ref 0.1–0.9)
MONOCYTES NFR BLD AUTO: 4.9 % (ref 5–12)
NEGATIVE CONTROL: NEGATIVE
NEUTROPHILS # BLD AUTO: 5.14 10*3/MM3 (ref 1.7–7)
NEUTROPHILS NFR BLD AUTO: 71.9 % (ref 42.7–76)
NITRITE UR QL STRIP: NEGATIVE
NRBC BLD AUTO-RTO: 0 /100 WBC (ref 0–0.2)
NT-PROBNP SERPL-MCNC: ABNORMAL PG/ML (ref 5–900)
PH UR STRIP.AUTO: <=5 [PH] (ref 5–8)
PLATELET # BLD AUTO: 251 10*3/MM3 (ref 140–450)
PMV BLD AUTO: 10.1 FL (ref 6–12)
POSITIVE CONTROL: POSITIVE
POTASSIUM BLD-SCNC: 4.3 MMOL/L (ref 3.5–5.2)
PROCALCITONIN SERPL-MCNC: 0.32 NG/ML (ref 0.1–0.25)
PROCALCITONIN SERPL-MCNC: 0.34 NG/ML (ref 0.1–0.25)
PROT SERPL-MCNC: 7.8 G/DL (ref 6–8.5)
PROT UR QL STRIP: ABNORMAL
PROTHROMBIN TIME: 38.5 SECONDS (ref 11.5–14)
RBC # BLD AUTO: 3.63 10*6/MM3 (ref 3.77–5.28)
RBC # UR: ABNORMAL /HPF
REF LAB TEST METHOD: ABNORMAL
RHINOVIRUS RNA SPEC NAA+PROBE: NOT DETECTED
RSV RNA NPH QL NAA+NON-PROBE: NOT DETECTED
SARS-COV-2 N GENE NPH QL NAA+PROBE: DETECTED
SODIUM BLD-SCNC: 140 MMOL/L (ref 136–145)
SP GR UR STRIP: 1.02 (ref 1–1.03)
SQUAMOUS #/AREA URNS HPF: ABNORMAL /HPF
TROPONIN T SERPL-MCNC: 0.1 NG/ML (ref 0–0.03)
TROPONIN T SERPL-MCNC: 0.11 NG/ML (ref 0–0.03)
TROPONIN T SERPL-MCNC: 0.13 NG/ML (ref 0–0.03)
UROBILINOGEN UR QL STRIP: ABNORMAL
WBC NRBC COR # BLD: 7.15 10*3/MM3 (ref 3.4–10.8)
WBC UR QL AUTO: ABNORMAL /HPF

## 2020-06-29 PROCEDURE — 93005 ELECTROCARDIOGRAM TRACING: CPT | Performed by: EMERGENCY MEDICINE

## 2020-06-29 PROCEDURE — 83880 ASSAY OF NATRIURETIC PEPTIDE: CPT | Performed by: EMERGENCY MEDICINE

## 2020-06-29 PROCEDURE — 25010000002 HEPARIN (PORCINE) PER 1000 UNITS: Performed by: INTERNAL MEDICINE

## 2020-06-29 PROCEDURE — P9612 CATHETERIZE FOR URINE SPEC: HCPCS

## 2020-06-29 PROCEDURE — 87150 DNA/RNA AMPLIFIED PROBE: CPT | Performed by: INTERNAL MEDICINE

## 2020-06-29 PROCEDURE — 5A1D70Z PERFORMANCE OF URINARY FILTRATION, INTERMITTENT, LESS THAN 6 HOURS PER DAY: ICD-10-PCS | Performed by: INTERNAL MEDICINE

## 2020-06-29 PROCEDURE — 84145 PROCALCITONIN (PCT): CPT | Performed by: EMERGENCY MEDICINE

## 2020-06-29 PROCEDURE — 84484 ASSAY OF TROPONIN QUANT: CPT | Performed by: INTERNAL MEDICINE

## 2020-06-29 PROCEDURE — 25010000002 ALBUMIN HUMAN 25% PER 50 ML: Performed by: INTERNAL MEDICINE

## 2020-06-29 PROCEDURE — 85610 PROTHROMBIN TIME: CPT | Performed by: EMERGENCY MEDICINE

## 2020-06-29 PROCEDURE — 25010000002 PIPERACILLIN SOD-TAZOBACTAM PER 1 G

## 2020-06-29 PROCEDURE — 25010000002 PIPERACILLIN SOD-TAZOBACTAM PER 1 G: Performed by: INTERNAL MEDICINE

## 2020-06-29 PROCEDURE — 83605 ASSAY OF LACTIC ACID: CPT | Performed by: EMERGENCY MEDICINE

## 2020-06-29 PROCEDURE — 85014 HEMATOCRIT: CPT | Performed by: INTERNAL MEDICINE

## 2020-06-29 PROCEDURE — 87635 SARS-COV-2 COVID-19 AMP PRB: CPT | Performed by: INTERNAL MEDICINE

## 2020-06-29 PROCEDURE — 82728 ASSAY OF FERRITIN: CPT | Performed by: EMERGENCY MEDICINE

## 2020-06-29 PROCEDURE — 25010000002 VANCOMYCIN 10 G RECONSTITUTED SOLUTION: Performed by: EMERGENCY MEDICINE

## 2020-06-29 PROCEDURE — 70450 CT HEAD/BRAIN W/O DYE: CPT

## 2020-06-29 PROCEDURE — 80053 COMPREHEN METABOLIC PANEL: CPT | Performed by: EMERGENCY MEDICINE

## 2020-06-29 PROCEDURE — 86140 C-REACTIVE PROTEIN: CPT | Performed by: EMERGENCY MEDICINE

## 2020-06-29 PROCEDURE — 0100U HC BIOFIRE FILMARRAY RESP PANEL 2: CPT | Performed by: INTERNAL MEDICINE

## 2020-06-29 PROCEDURE — 87147 CULTURE TYPE IMMUNOLOGIC: CPT | Performed by: INTERNAL MEDICINE

## 2020-06-29 PROCEDURE — 82962 GLUCOSE BLOOD TEST: CPT

## 2020-06-29 PROCEDURE — 85025 COMPLETE CBC W/AUTO DIFF WBC: CPT | Performed by: EMERGENCY MEDICINE

## 2020-06-29 PROCEDURE — 82270 OCCULT BLOOD FECES: CPT | Performed by: EMERGENCY MEDICINE

## 2020-06-29 PROCEDURE — 74176 CT ABD & PELVIS W/O CONTRAST: CPT

## 2020-06-29 PROCEDURE — 99223 1ST HOSP IP/OBS HIGH 75: CPT | Performed by: INTERNAL MEDICINE

## 2020-06-29 PROCEDURE — 84484 ASSAY OF TROPONIN QUANT: CPT | Performed by: EMERGENCY MEDICINE

## 2020-06-29 PROCEDURE — 85018 HEMOGLOBIN: CPT | Performed by: INTERNAL MEDICINE

## 2020-06-29 PROCEDURE — 87040 BLOOD CULTURE FOR BACTERIA: CPT | Performed by: INTERNAL MEDICINE

## 2020-06-29 PROCEDURE — 71250 CT THORAX DX C-: CPT

## 2020-06-29 PROCEDURE — 99284 EMERGENCY DEPT VISIT MOD MDM: CPT

## 2020-06-29 PROCEDURE — 81001 URINALYSIS AUTO W/SCOPE: CPT | Performed by: EMERGENCY MEDICINE

## 2020-06-29 PROCEDURE — 83615 LACTATE (LD) (LDH) ENZYME: CPT | Performed by: EMERGENCY MEDICINE

## 2020-06-29 PROCEDURE — P9047 ALBUMIN (HUMAN), 25%, 50ML: HCPCS | Performed by: INTERNAL MEDICINE

## 2020-06-29 PROCEDURE — 94640 AIRWAY INHALATION TREATMENT: CPT

## 2020-06-29 RX ORDER — NICOTINE POLACRILEX 4 MG
15 LOZENGE BUCCAL
Status: DISCONTINUED | OUTPATIENT
Start: 2020-06-29 | End: 2020-07-08 | Stop reason: HOSPADM

## 2020-06-29 RX ORDER — ATORVASTATIN CALCIUM 40 MG/1
80 TABLET, FILM COATED ORAL NIGHTLY
Status: DISCONTINUED | OUTPATIENT
Start: 2020-06-29 | End: 2020-07-08 | Stop reason: HOSPADM

## 2020-06-29 RX ORDER — ALBUMIN (HUMAN) 12.5 G/50ML
12.5 SOLUTION INTRAVENOUS AS NEEDED
Status: DISCONTINUED | OUTPATIENT
Start: 2020-06-29 | End: 2020-06-29

## 2020-06-29 RX ORDER — ACETAMINOPHEN 325 MG/1
650 TABLET ORAL EVERY 6 HOURS PRN
Status: DISCONTINUED | OUTPATIENT
Start: 2020-06-29 | End: 2020-07-08 | Stop reason: HOSPADM

## 2020-06-29 RX ORDER — POLYETHYLENE GLYCOL 3350 17 G/17G
17 POWDER, FOR SOLUTION ORAL 2 TIMES DAILY PRN
Status: DISCONTINUED | OUTPATIENT
Start: 2020-06-29 | End: 2020-07-08 | Stop reason: HOSPADM

## 2020-06-29 RX ORDER — SACCHAROMYCES BOULARDII 250 MG
250 CAPSULE ORAL DAILY
Status: DISCONTINUED | OUTPATIENT
Start: 2020-06-29 | End: 2020-07-08 | Stop reason: HOSPADM

## 2020-06-29 RX ORDER — PANTOPRAZOLE SODIUM 40 MG/1
40 TABLET, DELAYED RELEASE ORAL
Status: DISCONTINUED | OUTPATIENT
Start: 2020-06-29 | End: 2020-07-08 | Stop reason: HOSPADM

## 2020-06-29 RX ORDER — DEXTROSE MONOHYDRATE 25 G/50ML
25 INJECTION, SOLUTION INTRAVENOUS
Status: DISCONTINUED | OUTPATIENT
Start: 2020-06-29 | End: 2020-07-08 | Stop reason: HOSPADM

## 2020-06-29 RX ORDER — ISOSORBIDE MONONITRATE 30 MG/1
30 TABLET, EXTENDED RELEASE ORAL DAILY
Status: DISCONTINUED | OUTPATIENT
Start: 2020-06-29 | End: 2020-07-08 | Stop reason: HOSPADM

## 2020-06-29 RX ORDER — BUDESONIDE AND FORMOTEROL FUMARATE DIHYDRATE 160; 4.5 UG/1; UG/1
2 AEROSOL RESPIRATORY (INHALATION)
Status: DISCONTINUED | OUTPATIENT
Start: 2020-06-29 | End: 2020-07-08 | Stop reason: HOSPADM

## 2020-06-29 RX ORDER — SODIUM CHLORIDE 0.9 % (FLUSH) 0.9 %
10 SYRINGE (ML) INJECTION AS NEEDED
Status: DISCONTINUED | OUTPATIENT
Start: 2020-06-29 | End: 2020-07-08 | Stop reason: HOSPADM

## 2020-06-29 RX ORDER — HYDROCODONE BITARTRATE AND ACETAMINOPHEN 5; 325 MG/1; MG/1
1 TABLET ORAL EVERY 8 HOURS PRN
Status: DISCONTINUED | OUTPATIENT
Start: 2020-06-29 | End: 2020-06-29

## 2020-06-29 RX ORDER — SEVELAMER CARBONATE FOR ORAL SUSPENSION 800 MG/1
800 POWDER, FOR SUSPENSION ORAL
Status: DISCONTINUED | OUTPATIENT
Start: 2020-06-29 | End: 2020-07-08 | Stop reason: HOSPADM

## 2020-06-29 RX ORDER — ASPIRIN 81 MG/1
81 TABLET, CHEWABLE ORAL DAILY
Status: DISCONTINUED | OUTPATIENT
Start: 2020-06-29 | End: 2020-07-08 | Stop reason: HOSPADM

## 2020-06-29 RX ORDER — DOCUSATE SODIUM 100 MG/1
100 CAPSULE, LIQUID FILLED ORAL 2 TIMES DAILY
Status: DISCONTINUED | OUTPATIENT
Start: 2020-06-29 | End: 2020-07-08 | Stop reason: HOSPADM

## 2020-06-29 RX ORDER — HYDROCODONE BITARTRATE AND ACETAMINOPHEN 5; 325 MG/1; MG/1
1 TABLET ORAL EVERY 4 HOURS PRN
Status: DISCONTINUED | OUTPATIENT
Start: 2020-06-29 | End: 2020-07-08 | Stop reason: HOSPADM

## 2020-06-29 RX ORDER — DOXYCYCLINE 100 MG/1
100 CAPSULE ORAL EVERY 12 HOURS SCHEDULED
Status: DISCONTINUED | OUTPATIENT
Start: 2020-06-29 | End: 2020-07-01

## 2020-06-29 RX ORDER — HEPARIN SODIUM 1000 [USP'U]/ML
1800 INJECTION, SOLUTION INTRAVENOUS; SUBCUTANEOUS AS NEEDED
Status: DISCONTINUED | OUTPATIENT
Start: 2020-06-29 | End: 2020-07-08 | Stop reason: HOSPADM

## 2020-06-29 RX ORDER — SODIUM CHLORIDE 0.9 % (FLUSH) 0.9 %
10 SYRINGE (ML) INJECTION EVERY 12 HOURS SCHEDULED
Status: DISCONTINUED | OUTPATIENT
Start: 2020-06-29 | End: 2020-07-08 | Stop reason: HOSPADM

## 2020-06-29 RX ORDER — CALCITRIOL 0.25 UG/1
0.25 CAPSULE, LIQUID FILLED ORAL DAILY
Status: DISCONTINUED | OUTPATIENT
Start: 2020-06-29 | End: 2020-07-08 | Stop reason: HOSPADM

## 2020-06-29 RX ORDER — ONDANSETRON 2 MG/ML
4 INJECTION INTRAMUSCULAR; INTRAVENOUS EVERY 6 HOURS PRN
Status: DISCONTINUED | OUTPATIENT
Start: 2020-06-29 | End: 2020-07-08 | Stop reason: HOSPADM

## 2020-06-29 RX ORDER — ALBUMIN (HUMAN) 12.5 G/50ML
50 SOLUTION INTRAVENOUS AS NEEDED
Status: COMPLETED | OUTPATIENT
Start: 2020-06-29 | End: 2020-06-29

## 2020-06-29 RX ORDER — HYDROCORTISONE 25 MG/G
CREAM TOPICAL 2 TIMES DAILY
Status: DISCONTINUED | OUTPATIENT
Start: 2020-06-29 | End: 2020-07-08 | Stop reason: HOSPADM

## 2020-06-29 RX ORDER — CARVEDILOL 12.5 MG/1
25 TABLET ORAL 2 TIMES DAILY WITH MEALS
Status: DISCONTINUED | OUTPATIENT
Start: 2020-06-29 | End: 2020-06-30

## 2020-06-29 RX ADMIN — SERTRALINE 50 MG: 50 TABLET, FILM COATED ORAL at 10:04

## 2020-06-29 RX ADMIN — SODIUM CHLORIDE, PRESERVATIVE FREE 10 ML: 5 INJECTION INTRAVENOUS at 22:00

## 2020-06-29 RX ADMIN — HYDROCODONE BITARTRATE AND ACETAMINOPHEN 1 TABLET: 5; 325 TABLET ORAL at 10:04

## 2020-06-29 RX ADMIN — SEVELAMER CARBONATE 0.8 G: 800 POWDER, FOR SUSPENSION ORAL at 11:26

## 2020-06-29 RX ADMIN — ATORVASTATIN CALCIUM 80 MG: 40 TABLET, FILM COATED ORAL at 21:59

## 2020-06-29 RX ADMIN — DOCUSATE SODIUM 100 MG: 100 CAPSULE, LIQUID FILLED ORAL at 10:03

## 2020-06-29 RX ADMIN — Medication 250 MG: at 10:07

## 2020-06-29 RX ADMIN — ACETAMINOPHEN 650 MG: 325 TABLET, FILM COATED ORAL at 15:51

## 2020-06-29 RX ADMIN — HYDROCODONE BITARTRATE AND ACETAMINOPHEN 1 TABLET: 5; 325 TABLET ORAL at 17:45

## 2020-06-29 RX ADMIN — TAZOBACTAM SODIUM AND PIPERACILLIN SODIUM 3.38 G: 375; 3 INJECTION, SOLUTION INTRAVENOUS at 22:02

## 2020-06-29 RX ADMIN — CALCITRIOL CAPSULES 0.25 MCG 0.25 MCG: 0.25 CAPSULE ORAL at 10:06

## 2020-06-29 RX ADMIN — ALBUMIN HUMAN 50 G: 0.25 SOLUTION INTRAVENOUS at 15:48

## 2020-06-29 RX ADMIN — DOXYCYCLINE 100 MG: 100 CAPSULE ORAL at 21:59

## 2020-06-29 RX ADMIN — HYDROCORTISONE: 25 CREAM TOPICAL at 22:02

## 2020-06-29 RX ADMIN — BUDESONIDE AND FORMOTEROL FUMARATE DIHYDRATE 2 PUFF: 160; 4.5 AEROSOL RESPIRATORY (INHALATION) at 19:48

## 2020-06-29 RX ADMIN — DOXYCYCLINE 100 MG: 100 CAPSULE ORAL at 10:04

## 2020-06-29 RX ADMIN — PANTOPRAZOLE SODIUM 40 MG: 40 TABLET, DELAYED RELEASE ORAL at 10:03

## 2020-06-29 RX ADMIN — SEVELAMER CARBONATE 0.8 G: 800 POWDER, FOR SUSPENSION ORAL at 10:04

## 2020-06-29 RX ADMIN — TAZOBACTAM SODIUM AND PIPERACILLIN SODIUM 4.5 G: 500; 4 INJECTION, SOLUTION INTRAVENOUS at 06:51

## 2020-06-29 RX ADMIN — HEPARIN SODIUM 1800 UNITS: 1000 INJECTION INTRAVENOUS; SUBCUTANEOUS at 18:00

## 2020-06-29 RX ADMIN — HYDROCORTISONE: 25 CREAM TOPICAL at 17:46

## 2020-06-29 RX ADMIN — ALBUMIN HUMAN 50 G: 0.25 SOLUTION INTRAVENOUS at 16:18

## 2020-06-29 RX ADMIN — VANCOMYCIN HYDROCHLORIDE 3000 MG: 10 INJECTION, POWDER, LYOPHILIZED, FOR SOLUTION INTRAVENOUS at 06:51

## 2020-06-30 LAB
ANION GAP SERPL CALCULATED.3IONS-SCNC: 15 MMOL/L (ref 5–15)
BACTERIA BLD CULT: ABNORMAL
BASOPHILS # BLD AUTO: 0.03 10*3/MM3 (ref 0–0.2)
BASOPHILS NFR BLD AUTO: 0.4 % (ref 0–1.5)
BUN SERPL-MCNC: 52 MG/DL (ref 8–23)
BUN/CREAT SERPL: 10.8 (ref 7–25)
CALCIUM SPEC-SCNC: 7.8 MG/DL (ref 8.6–10.5)
CHLORIDE SERPL-SCNC: 98 MMOL/L (ref 98–107)
CO2 SERPL-SCNC: 27 MMOL/L (ref 22–29)
CREAT SERPL-MCNC: 4.81 MG/DL (ref 0.57–1)
DEPRECATED RDW RBC AUTO: 61.6 FL (ref 37–54)
EOSINOPHIL # BLD AUTO: 0.51 10*3/MM3 (ref 0–0.4)
EOSINOPHIL NFR BLD AUTO: 7.6 % (ref 0.3–6.2)
ERYTHROCYTE [DISTWIDTH] IN BLOOD BY AUTOMATED COUNT: 16.6 % (ref 12.3–15.4)
GFR SERPL CREATININE-BSD FRML MDRD: 11 ML/MIN/1.73
GFR SERPL CREATININE-BSD FRML MDRD: ABNORMAL ML/MIN/{1.73_M2}
GLUCOSE BLDC GLUCOMTR-MCNC: 140 MG/DL (ref 70–130)
GLUCOSE BLDC GLUCOMTR-MCNC: 153 MG/DL (ref 70–130)
GLUCOSE BLDC GLUCOMTR-MCNC: 162 MG/DL (ref 70–130)
GLUCOSE BLDC GLUCOMTR-MCNC: 174 MG/DL (ref 70–130)
GLUCOSE SERPL-MCNC: 185 MG/DL (ref 65–99)
HAV IGM SERPL QL IA: NORMAL
HBV CORE IGM SERPL QL IA: NORMAL
HBV SURFACE AG SERPL QL IA: NORMAL
HCT VFR BLD AUTO: 25.5 % (ref 34–46.6)
HCT VFR BLD AUTO: 26.5 % (ref 34–46.6)
HCT VFR BLD AUTO: 26.5 % (ref 34–46.6)
HCT VFR BLD AUTO: 29.6 % (ref 34–46.6)
HCV AB SER DONR QL: NORMAL
HGB BLD-MCNC: 7.7 G/DL (ref 12–15.9)
HGB BLD-MCNC: 7.8 G/DL (ref 12–15.9)
HGB BLD-MCNC: 7.8 G/DL (ref 12–15.9)
HGB BLD-MCNC: 8.1 G/DL (ref 12–15.9)
HOLD SPECIMEN: NORMAL
IMM GRANULOCYTES # BLD AUTO: 0.02 10*3/MM3 (ref 0–0.05)
IMM GRANULOCYTES NFR BLD AUTO: 0.3 % (ref 0–0.5)
INR PPP: 4.64 (ref 0.85–1.16)
LYMPHOCYTES # BLD AUTO: 1.12 10*3/MM3 (ref 0.7–3.1)
LYMPHOCYTES NFR BLD AUTO: 16.7 % (ref 19.6–45.3)
MCH RBC QN AUTO: 29.4 PG (ref 26.6–33)
MCHC RBC AUTO-ENTMCNC: 29.4 G/DL (ref 31.5–35.7)
MCV RBC AUTO: 100 FL (ref 79–97)
MONOCYTES # BLD AUTO: 0.54 10*3/MM3 (ref 0.1–0.9)
MONOCYTES NFR BLD AUTO: 8 % (ref 5–12)
NEUTROPHILS NFR BLD AUTO: 4.5 10*3/MM3 (ref 1.7–7)
NEUTROPHILS NFR BLD AUTO: 67 % (ref 42.7–76)
NRBC BLD AUTO-RTO: 0 /100 WBC (ref 0–0.2)
PLATELET # BLD AUTO: 217 10*3/MM3 (ref 140–450)
PMV BLD AUTO: 9.6 FL (ref 6–12)
POTASSIUM SERPL-SCNC: 3.9 MMOL/L (ref 3.5–5.2)
PROTHROMBIN TIME: 43.6 SECONDS (ref 11.5–14)
RBC # BLD AUTO: 2.65 10*6/MM3 (ref 3.77–5.28)
SODIUM SERPL-SCNC: 140 MMOL/L (ref 136–145)
TROPONIN T SERPL-MCNC: 0.11 NG/ML (ref 0–0.03)
WBC # BLD AUTO: 6.72 10*3/MM3 (ref 3.4–10.8)

## 2020-06-30 PROCEDURE — 85014 HEMATOCRIT: CPT | Performed by: INTERNAL MEDICINE

## 2020-06-30 PROCEDURE — 25010000002 ONDANSETRON PER 1 MG: Performed by: INTERNAL MEDICINE

## 2020-06-30 PROCEDURE — 25010000002 ALBUMIN HUMAN 25% PER 50 ML: Performed by: INTERNAL MEDICINE

## 2020-06-30 PROCEDURE — 85018 HEMOGLOBIN: CPT | Performed by: INTERNAL MEDICINE

## 2020-06-30 PROCEDURE — 80048 BASIC METABOLIC PNL TOTAL CA: CPT | Performed by: INTERNAL MEDICINE

## 2020-06-30 PROCEDURE — 84484 ASSAY OF TROPONIN QUANT: CPT | Performed by: INTERNAL MEDICINE

## 2020-06-30 PROCEDURE — 25010000002 PIPERACILLIN SOD-TAZOBACTAM PER 1 G: Performed by: INTERNAL MEDICINE

## 2020-06-30 PROCEDURE — 82962 GLUCOSE BLOOD TEST: CPT

## 2020-06-30 PROCEDURE — 80074 ACUTE HEPATITIS PANEL: CPT | Performed by: INTERNAL MEDICINE

## 2020-06-30 PROCEDURE — 99233 SBSQ HOSP IP/OBS HIGH 50: CPT | Performed by: INTERNAL MEDICINE

## 2020-06-30 PROCEDURE — 94799 UNLISTED PULMONARY SVC/PX: CPT

## 2020-06-30 PROCEDURE — 63710000001 INSULIN LISPRO (HUMAN) PER 5 UNITS: Performed by: INTERNAL MEDICINE

## 2020-06-30 PROCEDURE — P9047 ALBUMIN (HUMAN), 25%, 50ML: HCPCS | Performed by: INTERNAL MEDICINE

## 2020-06-30 PROCEDURE — 85025 COMPLETE CBC W/AUTO DIFF WBC: CPT | Performed by: INTERNAL MEDICINE

## 2020-06-30 PROCEDURE — 85610 PROTHROMBIN TIME: CPT

## 2020-06-30 RX ORDER — CARVEDILOL 6.25 MG/1
6.25 TABLET ORAL 2 TIMES DAILY WITH MEALS
Status: DISCONTINUED | OUTPATIENT
Start: 2020-06-30 | End: 2020-07-02

## 2020-06-30 RX ORDER — ALBUMIN (HUMAN) 12.5 G/50ML
25 SOLUTION INTRAVENOUS ONCE
Status: COMPLETED | OUTPATIENT
Start: 2020-06-30 | End: 2020-06-30

## 2020-06-30 RX ORDER — NYSTATIN 100000 [USP'U]/G
POWDER TOPICAL EVERY 12 HOURS SCHEDULED
Status: DISCONTINUED | OUTPATIENT
Start: 2020-06-30 | End: 2020-07-08 | Stop reason: HOSPADM

## 2020-06-30 RX ADMIN — BUDESONIDE AND FORMOTEROL FUMARATE DIHYDRATE 2 PUFF: 160; 4.5 AEROSOL RESPIRATORY (INHALATION) at 19:59

## 2020-06-30 RX ADMIN — TAZOBACTAM SODIUM AND PIPERACILLIN SODIUM 3.38 G: 375; 3 INJECTION, SOLUTION INTRAVENOUS at 07:00

## 2020-06-30 RX ADMIN — INSULIN LISPRO 2 UNITS: 100 INJECTION, SOLUTION INTRAVENOUS; SUBCUTANEOUS at 11:26

## 2020-06-30 RX ADMIN — SODIUM CHLORIDE, PRESERVATIVE FREE 10 ML: 5 INJECTION INTRAVENOUS at 08:43

## 2020-06-30 RX ADMIN — ALBUMIN HUMAN 25 G: 0.25 SOLUTION INTRAVENOUS at 11:26

## 2020-06-30 RX ADMIN — HYDROCORTISONE: 25 CREAM TOPICAL at 08:42

## 2020-06-30 RX ADMIN — SEVELAMER CARBONATE 0.8 G: 800 POWDER, FOR SUSPENSION ORAL at 08:42

## 2020-06-30 RX ADMIN — TAZOBACTAM SODIUM AND PIPERACILLIN SODIUM 3.38 G: 375; 3 INJECTION, SOLUTION INTRAVENOUS at 17:21

## 2020-06-30 RX ADMIN — ATORVASTATIN CALCIUM 80 MG: 40 TABLET, FILM COATED ORAL at 20:56

## 2020-06-30 RX ADMIN — CARVEDILOL 25 MG: 12.5 TABLET, FILM COATED ORAL at 08:44

## 2020-06-30 RX ADMIN — SEVELAMER CARBONATE 0.8 G: 800 POWDER, FOR SUSPENSION ORAL at 11:26

## 2020-06-30 RX ADMIN — DOXYCYCLINE 100 MG: 100 CAPSULE ORAL at 20:56

## 2020-06-30 RX ADMIN — ASPIRIN 81 MG CHEWABLE TABLET 81 MG: 81 TABLET CHEWABLE at 08:44

## 2020-06-30 RX ADMIN — ONDANSETRON 4 MG: 2 INJECTION INTRAMUSCULAR; INTRAVENOUS at 20:56

## 2020-06-30 RX ADMIN — SERTRALINE 50 MG: 50 TABLET, FILM COATED ORAL at 08:44

## 2020-06-30 RX ADMIN — CALCITRIOL CAPSULES 0.25 MCG 0.25 MCG: 0.25 CAPSULE ORAL at 08:44

## 2020-06-30 RX ADMIN — PANTOPRAZOLE SODIUM 40 MG: 40 TABLET, DELAYED RELEASE ORAL at 07:00

## 2020-06-30 RX ADMIN — NYSTATIN: 100000 POWDER TOPICAL at 08:43

## 2020-06-30 RX ADMIN — SEVELAMER CARBONATE 0.8 G: 800 POWDER, FOR SUSPENSION ORAL at 17:21

## 2020-06-30 RX ADMIN — SODIUM CHLORIDE, PRESERVATIVE FREE 10 ML: 5 INJECTION INTRAVENOUS at 20:58

## 2020-06-30 RX ADMIN — HYDROCORTISONE: 25 CREAM TOPICAL at 20:58

## 2020-06-30 RX ADMIN — DOXYCYCLINE 100 MG: 100 CAPSULE ORAL at 08:44

## 2020-06-30 RX ADMIN — NYSTATIN: 100000 POWDER TOPICAL at 20:58

## 2020-06-30 RX ADMIN — ISOSORBIDE MONONITRATE 30 MG: 30 TABLET, EXTENDED RELEASE ORAL at 08:44

## 2020-06-30 RX ADMIN — Medication 250 MG: at 08:44

## 2020-06-30 RX ADMIN — BUDESONIDE AND FORMOTEROL FUMARATE DIHYDRATE 2 PUFF: 160; 4.5 AEROSOL RESPIRATORY (INHALATION) at 10:22

## 2020-06-30 RX ADMIN — INSULIN LISPRO 2 UNITS: 100 INJECTION, SOLUTION INTRAVENOUS; SUBCUTANEOUS at 17:22

## 2020-07-01 ENCOUNTER — APPOINTMENT (OUTPATIENT)
Dept: NEPHROLOGY | Facility: HOSPITAL | Age: 70
End: 2020-07-01

## 2020-07-01 PROBLEM — R04.0 EPISTAXIS: Status: RESOLVED | Noted: 2020-06-29 | Resolved: 2020-07-01

## 2020-07-01 PROBLEM — D62 ACUTE BLOOD LOSS ANEMIA: Status: ACTIVE | Noted: 2020-07-01

## 2020-07-01 PROBLEM — J12.82 PNEUMONIA DUE TO COVID-19 VIRUS: Status: ACTIVE | Noted: 2020-06-29

## 2020-07-01 LAB
ANION GAP SERPL CALCULATED.3IONS-SCNC: 10 MMOL/L (ref 5–15)
ARTERIAL PATENCY WRIST A: ABNORMAL
ATMOSPHERIC PRESS: ABNORMAL MM[HG]
BASE EXCESS BLDA CALC-SCNC: 1.4 MMOL/L (ref 0–2)
BDY SITE: ABNORMAL
BODY TEMPERATURE: 37 C
BUN SERPL-MCNC: 25 MG/DL (ref 8–23)
BUN/CREAT SERPL: 8.8 (ref 7–25)
CALCIUM SPEC-SCNC: 8.7 MG/DL (ref 8.6–10.5)
CHLORIDE SERPL-SCNC: 101 MMOL/L (ref 98–107)
CO2 BLDA-SCNC: 31.2 MMOL/L (ref 22–33)
CO2 SERPL-SCNC: 29 MMOL/L (ref 22–29)
COHGB MFR BLD: 2.2 % (ref 0–2)
CREAT SERPL-MCNC: 2.85 MG/DL (ref 0.57–1)
CRP SERPL-MCNC: 0.86 MG/DL (ref 0–0.5)
EPAP: 0
GFR SERPL CREATININE-BSD FRML MDRD: 20 ML/MIN/1.73
GLUCOSE BLDC GLUCOMTR-MCNC: 104 MG/DL (ref 70–130)
GLUCOSE BLDC GLUCOMTR-MCNC: 115 MG/DL (ref 70–130)
GLUCOSE BLDC GLUCOMTR-MCNC: 91 MG/DL (ref 70–130)
GLUCOSE SERPL-MCNC: 131 MG/DL (ref 65–99)
HCO3 BLDA-SCNC: 29.1 MMOL/L (ref 20–26)
HCT VFR BLD AUTO: 25.8 % (ref 34–46.6)
HCT VFR BLD AUTO: 26.6 % (ref 34–46.6)
HCT VFR BLD CALC: 23.4 %
HGB BLD-MCNC: 7.7 G/DL (ref 12–15.9)
HGB BLD-MCNC: 7.9 G/DL (ref 12–15.9)
HGB BLDA-MCNC: 7.6 G/DL (ref 14–18)
INHALED O2 CONCENTRATION: 28 %
INR PPP: 3.65 (ref 0.85–1.16)
IPAP: 0
METHGB BLD QL: 0.4 % (ref 0–1.5)
MODALITY: ABNORMAL
NOTE: ABNORMAL
OXYHGB MFR BLDV: 94.3 % (ref 94–99)
PAW @ PEAK INSP FLOW SETTING VENT: 0 CMH2O
PCO2 BLDA: 65.9 MM HG (ref 35–45)
PCO2 TEMP ADJ BLD: 65.9 MM HG (ref 35–45)
PH BLDA: 7.25 PH UNITS (ref 7.35–7.45)
PH, TEMP CORRECTED: 7.25 PH UNITS
PO2 BLDA: 92.8 MM HG (ref 83–108)
PO2 TEMP ADJ BLD: 92.8 MM HG (ref 83–108)
POTASSIUM SERPL-SCNC: 3.7 MMOL/L (ref 3.5–5.2)
PROTHROMBIN TIME: 36.1 SECONDS (ref 11.5–14)
SODIUM SERPL-SCNC: 140 MMOL/L (ref 136–145)
TOTAL RATE: 0 BREATHS/MINUTE

## 2020-07-01 PROCEDURE — 85610 PROTHROMBIN TIME: CPT

## 2020-07-01 PROCEDURE — 25010000002 ALBUMIN HUMAN 25% PER 50 ML: Performed by: INTERNAL MEDICINE

## 2020-07-01 PROCEDURE — 25010000002 HEPARIN (PORCINE) PER 1000 UNITS: Performed by: INTERNAL MEDICINE

## 2020-07-01 PROCEDURE — 36600 WITHDRAWAL OF ARTERIAL BLOOD: CPT

## 2020-07-01 PROCEDURE — 80048 BASIC METABOLIC PNL TOTAL CA: CPT | Performed by: INTERNAL MEDICINE

## 2020-07-01 PROCEDURE — 94799 UNLISTED PULMONARY SVC/PX: CPT

## 2020-07-01 PROCEDURE — 86140 C-REACTIVE PROTEIN: CPT | Performed by: INTERNAL MEDICINE

## 2020-07-01 PROCEDURE — 85018 HEMOGLOBIN: CPT | Performed by: INTERNAL MEDICINE

## 2020-07-01 PROCEDURE — 25010000002 ONDANSETRON PER 1 MG: Performed by: INTERNAL MEDICINE

## 2020-07-01 PROCEDURE — 85014 HEMATOCRIT: CPT | Performed by: INTERNAL MEDICINE

## 2020-07-01 PROCEDURE — 25010000002 PIPERACILLIN SOD-TAZOBACTAM PER 1 G: Performed by: INTERNAL MEDICINE

## 2020-07-01 PROCEDURE — 82962 GLUCOSE BLOOD TEST: CPT

## 2020-07-01 PROCEDURE — 82805 BLOOD GASES W/O2 SATURATION: CPT

## 2020-07-01 PROCEDURE — 94660 CPAP INITIATION&MGMT: CPT

## 2020-07-01 PROCEDURE — 99233 SBSQ HOSP IP/OBS HIGH 50: CPT | Performed by: INTERNAL MEDICINE

## 2020-07-01 PROCEDURE — P9047 ALBUMIN (HUMAN), 25%, 50ML: HCPCS | Performed by: INTERNAL MEDICINE

## 2020-07-01 PROCEDURE — 5A1D70Z PERFORMANCE OF URINARY FILTRATION, INTERMITTENT, LESS THAN 6 HOURS PER DAY: ICD-10-PCS | Performed by: INTERNAL MEDICINE

## 2020-07-01 RX ORDER — ALBUMIN (HUMAN) 12.5 G/50ML
25 SOLUTION INTRAVENOUS AS NEEDED
Status: DISPENSED | OUTPATIENT
Start: 2020-07-01 | End: 2020-07-01

## 2020-07-01 RX ORDER — ALBUMIN (HUMAN) 12.5 G/50ML
12.5 SOLUTION INTRAVENOUS AS NEEDED
Status: DISCONTINUED | OUTPATIENT
Start: 2020-07-01 | End: 2020-07-01

## 2020-07-01 RX ADMIN — DOCUSATE SODIUM 100 MG: 100 CAPSULE, LIQUID FILLED ORAL at 20:15

## 2020-07-01 RX ADMIN — SODIUM CHLORIDE, PRESERVATIVE FREE 10 ML: 5 INJECTION INTRAVENOUS at 11:15

## 2020-07-01 RX ADMIN — Medication 250 MG: at 11:14

## 2020-07-01 RX ADMIN — TAZOBACTAM SODIUM AND PIPERACILLIN SODIUM 3.38 G: 375; 3 INJECTION, SOLUTION INTRAVENOUS at 07:30

## 2020-07-01 RX ADMIN — ALBUMIN HUMAN 50 G: 0.25 SOLUTION INTRAVENOUS at 08:00

## 2020-07-01 RX ADMIN — HYDROCORTISONE: 25 CREAM TOPICAL at 20:17

## 2020-07-01 RX ADMIN — NYSTATIN 1 APPLICATION: 100000 POWDER TOPICAL at 20:17

## 2020-07-01 RX ADMIN — HEPARIN SODIUM 1800 UNITS: 1000 INJECTION INTRAVENOUS; SUBCUTANEOUS at 11:23

## 2020-07-01 RX ADMIN — PANTOPRAZOLE SODIUM 40 MG: 40 TABLET, DELAYED RELEASE ORAL at 11:14

## 2020-07-01 RX ADMIN — TAZOBACTAM SODIUM AND PIPERACILLIN SODIUM 3.38 G: 375; 3 INJECTION, SOLUTION INTRAVENOUS at 17:24

## 2020-07-01 RX ADMIN — BUDESONIDE AND FORMOTEROL FUMARATE DIHYDRATE 2 PUFF: 160; 4.5 AEROSOL RESPIRATORY (INHALATION) at 10:06

## 2020-07-01 RX ADMIN — CARVEDILOL 6.25 MG: 6.25 TABLET, FILM COATED ORAL at 20:15

## 2020-07-01 RX ADMIN — DOXYCYCLINE 100 MG: 100 CAPSULE ORAL at 11:13

## 2020-07-01 RX ADMIN — ONDANSETRON 4 MG: 2 INJECTION INTRAMUSCULAR; INTRAVENOUS at 04:46

## 2020-07-01 RX ADMIN — SEVELAMER CARBONATE 0.8 G: 800 POWDER, FOR SUSPENSION ORAL at 11:22

## 2020-07-01 RX ADMIN — ALBUMIN HUMAN 25 G: 0.25 SOLUTION INTRAVENOUS at 08:15

## 2020-07-01 RX ADMIN — ASPIRIN 81 MG CHEWABLE TABLET 81 MG: 81 TABLET CHEWABLE at 11:10

## 2020-07-01 RX ADMIN — SERTRALINE 50 MG: 50 TABLET, FILM COATED ORAL at 11:15

## 2020-07-01 RX ADMIN — ATORVASTATIN CALCIUM 80 MG: 40 TABLET, FILM COATED ORAL at 20:16

## 2020-07-01 RX ADMIN — CALCITRIOL CAPSULES 0.25 MCG 0.25 MCG: 0.25 CAPSULE ORAL at 11:12

## 2020-07-01 NOTE — PROGRESS NOTES
"Pharmacy Consult  -  Warfarin    Joy Bueno is a  70 y.o. female   Height - 162.6 cm (64\")  Weight - (!) 142 kg (313 lb 12.8 oz)    Consulting Provider: - Nanda Gonzalez DO  Indication: - Hx of DVT  Goal INR: - 2-3  Home Regimen:   - warfarin 6mg daily    Bridge Therapy: No       Drug-Drug Interactions with current regimen:   Doxycycline - increased bleeding risk              Pantoprazole - may increase INR              Zosyn - may increase bleeding risk    Warfarin Dosing During Admission:    Date  6/29 6/30 7/1         INR  3.97 4.64 3.65         Dose  0 0 0              Education Provided:    Discharge Follow up:   Following Provider - Resident [facility]   Follow up time range or appointment - 2-3 days following discharge      Labs:    Results from last 7 days   Lab Units 07/01/20  1406 07/01/20  0254 06/30/20  1615 06/30/20  0930 06/30/20  0352 06/30/20  0048 06/29/20  1711 06/29/20  0530   INR  3.65*  --   --  4.64*  --   --   --  3.97*   HEMOGLOBIN g/dL 7.7* 7.9* 7.7*  --  7.8*  7.8* 8.1* 7.9* 10.8*   HEMATOCRIT % 26.6* 25.8* 25.5*  --  26.5*  26.5* 29.6* 26.6* 36.4     Results from last 7 days   Lab Units 06/30/20  0352 06/29/20  0530   SODIUM mmol/L 140 140   POTASSIUM mmol/L 3.9 4.3   CHLORIDE mmol/L 98 98   CO2 mmol/L 27.0 27.0   BUN mg/dL 52* 88*   CREATININE mg/dL 4.81* 7.17*   CALCIUM mg/dL 7.8* 7.7*   BILIRUBIN mg/dL  --  0.5   ALK PHOS U/L  --  94   ALT (SGPT) U/L  --  10   AST (SGOT) U/L  --  15   GLUCOSE mg/dL 185* 161*       Current dietary intake:   Diet Order   Procedures    Diet Regular; Cardiac, Consistent Carbohydrate, Renal     Assessment/Plan:   Warfarin dosing for hx DVT  Goal INR: 2-3  7/1 INR - 3.65, decreased from 4.64  7/1 H/H - 7.7/26.6    With supratherapeutic INR, will continue to hold warfarin   Monitor s/s bleeding, dietary intake, clinical status and drug-drug interactions  Follow daily INR and adjust dose accordingly    Thanks  Andrez Orantes, " [facility]  7/1/2020  14:46

## 2020-07-01 NOTE — PROGRESS NOTES
"   LOS: 2 days    Patient Care Team:  Goldy Dior MD as PCP - General (Internal Medicine)    Chief Complaint: Shortness of breath COVID-19 positive  Consulted for ESRD.  70-year-old female with ESRD on hemodialysis Monday Wednesday Friday, CAD, diabetes, history of DVT on Coumadin, history of CVA cerebellar, morbid obesity, hemorrhoids with anal fissures presented with more places and headache.  She has missed her dialysis earlier.  She was recently diagnosed with COVID-19.  ET scan has shown pulmonary edema.  Subjective   Seen on HD. Appears tired and sleepy. Will check Blood gas to rule out CO2 narcosis.       Review of Systems:    Review of systems could not be obtained due to  emergent nature of case.    Objective     Vital Sign Min/Max for last 24 hours  Temp  Min: 97.2 °F (36.2 °C)  Max: 98.3 °F (36.8 °C)   BP  Min: 78/41  Max: 117/54   Pulse  Min: 59  Max: 90   Resp  Min: 16  Max: 20   SpO2  Min: 92 %  Max: 100 %   No data recorded   Weight  Min: 142 kg (313 lb 12.8 oz)  Max: 142 kg (313 lb 12.8 oz)     Flowsheet Rows      First Filed Value   Admission Height  162.6 cm (64\") Documented at 06/29/2020 0341   Admission Weight  (!) 147 kg (324 lb) Documented at 06/29/2020 0341          I/O this shift:  In: 300 [IV Piggyback:300]  Out: 2901 [Other:2900; Stool:1]  I/O last 3 completed shifts:  In: -   Out: 100 [Urine:100]    Physical Exam:  Due to COVID-19: Patient's examinations received from the staff RN and nursing assistant.  Patient is hypotensive although asymptomatic.  General Appearance: , no obvious distress.   Neck: Supple no JVD.  Lungs:Equal chest movement, nonlabored.  Heart: Regular  Abdomen: Obesity  Extremities: Positive edema per RN  Neuro: Per RN      WBC WBC   Date Value Ref Range Status   06/30/2020 6.72 3.40 - 10.80 10*3/mm3 Final   06/29/2020 7.15 3.40 - 10.80 10*3/mm3 Final      HGB Hemoglobin   Date Value Ref Range Status   07/01/2020 7.9 (L) 12.0 - 15.9 g/dL Final   06/30/2020 7.7 " (L) 12.0 - 15.9 g/dL Final   06/30/2020 7.8 (L) 12.0 - 15.9 g/dL Final   06/30/2020 7.8 (L) 12.0 - 15.9 g/dL Final   06/30/2020 8.1 (L) 12.0 - 15.9 g/dL Final   06/29/2020 7.9 (L) 12.0 - 15.9 g/dL Final   06/29/2020 10.8 (L) 12.0 - 15.9 g/dL Final      HCT Hematocrit   Date Value Ref Range Status   07/01/2020 25.8 (L) 34.0 - 46.6 % Final   06/30/2020 25.5 (L) 34.0 - 46.6 % Final   06/30/2020 26.5 (L) 34.0 - 46.6 % Final   06/30/2020 26.5 (L) 34.0 - 46.6 % Final   06/30/2020 29.6 (L) 34.0 - 46.6 % Final   06/29/2020 26.6 (L) 34.0 - 46.6 % Final   06/29/2020 36.4 34.0 - 46.6 % Final      Platlets No results found for: LABPLAT   MCV MCV   Date Value Ref Range Status   06/30/2020 100.0 (H) 79.0 - 97.0 fL Final   06/29/2020 100.3 (H) 79.0 - 97.0 fL Final          Sodium Sodium   Date Value Ref Range Status   06/30/2020 140 136 - 145 mmol/L Final   06/29/2020 140 136 - 145 mmol/L Final      Potassium Potassium   Date Value Ref Range Status   06/30/2020 3.9 3.5 - 5.2 mmol/L Final   06/29/2020 4.3 3.5 - 5.2 mmol/L Final      Chloride Chloride   Date Value Ref Range Status   06/30/2020 98 98 - 107 mmol/L Final   06/29/2020 98 98 - 107 mmol/L Final      CO2 CO2   Date Value Ref Range Status   06/30/2020 27.0 22.0 - 29.0 mmol/L Final   06/29/2020 27.0 22.0 - 29.0 mmol/L Final      BUN BUN   Date Value Ref Range Status   06/30/2020 52 (H) 8 - 23 mg/dL Final   06/29/2020 88 (H) 8 - 23 mg/dL Final      Creatinine Creatinine   Date Value Ref Range Status   06/30/2020 4.81 (H) 0.57 - 1.00 mg/dL Final   06/29/2020 7.17 (H) 0.57 - 1.00 mg/dL Final      Calcium Calcium   Date Value Ref Range Status   06/30/2020 7.8 (L) 8.6 - 10.5 mg/dL Final   06/29/2020 7.7 (L) 8.6 - 10.5 mg/dL Final      PO4 No results found for: CAPO4   Albumin Albumin   Date Value Ref Range Status   06/29/2020 3.50 3.50 - 5.20 g/dL Final      Magnesium No results found for: MG   Uric Acid No results found for: URICACID        Results Review:     I reviewed the  patient's new clinical results.      aspirin 81 mg Oral Daily   atorvastatin 80 mg Oral Nightly   budesonide-formoterol 2 puff Inhalation BID - RT   calcitriol 0.25 mcg Oral Daily   carvedilol 6.25 mg Oral BID With Meals   docusate sodium 100 mg Oral BID   doxycycline 100 mg Oral Q12H   Hydrocortisone (Perianal)  Rectal BID   insulin lispro 0-7 Units Subcutaneous TID AC   isosorbide mononitrate 30 mg Oral Daily   nystatin  Topical Q12H   pantoprazole 40 mg Oral Q AM   piperacillin-tazobactam 3.375 g Intravenous Q12H   saccharomyces boulardii 250 mg Oral Daily   sertraline 50 mg Oral Daily   sevelamer 800 mg Oral TID With Meals   sodium chloride 10 mL Intravenous Q12H   warfarin (COUMADIN) (dosing per levels)  Does not apply Daily       Pharmacy Consult        Medication Review: As above    Assessment/Plan       Volume overload    Essential hypertension    Morbid obesity (CMS/HCC)    Acute respiratory failure with hypoxia (CMS/HCC)    ESRD (end stage renal disease) (CMS/HCC)    Chronic diastolic heart failure (CMS/HCC)    History of deep vein thrombosis (DVT) of brachial vein of left upper extremity (CMS/HCC)    Anticoagulated on Coumadin    Supratherapeutic INR    Pneumonia    Epistaxis    Hemorrhoids    COVID-19 positive  1.  ESRD dialysis Monday Wednesday Friday.  2.  Hypotension: Patient has received Coreg earlier today.  3.  BMD: On phosphate binders  4.  Hypoxia: Monitor closely.  5.  Anemia of chronic kidney disease.  6.  Acute respiratory failure with hypoxia COVID-19 positive.  7.  Epistaxis with supratherapeutic INR.  Plan:  HD per [facility]e.   Transfuse at hb<7. Avoid KIRK in the setting of COVID-19 infection.   High risk patient with multiple medical problems    Bhaskar Trevino MD  07/01/20  12:06

## 2020-07-01 NOTE — PROGRESS NOTES
Continued Stay Note  [facility]     Patient Name: Joy Bueno  MRN: [id number]  Today's Date: 7/1/2020    Admit Date: 6/29/2020    Discharge Plan     Row Name 07/01/20 1349       Plan    Plan  [facility]    Patient/Family in Agreement with Plan  yes    Plan Comments  Spoke with Keshawn at [facility].  Clarified that they will be able to accept patient at discharge even though she is Covid positive.  CM will continue to follow.  Megha Tam RN [phone]    Final Discharge Disposition Code  04 - intermediate care facility        Discharge Codes    No documentation.       Expected Discharge Date and Time     Expected Discharge Date Expected Discharge Time    Jul 3, 2020             Megha Tam RN

## 2020-07-01 NOTE — CONSULTS
Joy Bueno  1950  [id number]    Date of Consult: 7/1/2020    Requesting Provider: Nanda Gonzalez DO  Evaluating Physician: Jesus Olivo MD    Chief Complaint: shortness of breath    Reason for Consultation: COVID-19 infection    In the setting of the current COVID-19 pandemic and a critical shortage and PPE, the patient was examined through the window and with telephone conference in an effort to save PPE and reduce healthcare exposure.    History of present illness:    Joy Bueno is a 70 y.o.  Yr old female with history of ESRD on HD M/W/F, coronary artery disease, diabetes mellitus type 2, history of a DVT on Coumadin, history of CVA, morbid obesity, and obstructive sleep apnea who presented to [facility] on 6/29 with complaints of nose bleeding and hemoptysis.She additionally had a new onset headache and cough the night before.  She had missed 3 dialysis sessions since the prior Friday.  She apparently had some bad hemorrhoids with pain and thus missed her dialysis sessions.  She tested positive for COVID-19 about one week prior to admission.     Since arrival, the patient has remained afebrile with a Tmax 98.3°F. Her O2 status has fluctuated between room air and 4 L nasal cannula, mostly recently on 2 L nasal cannula but was just switched to BIPAP due to some confusion and hypercapnea. Found to have a nosebleed and some anemia. Urinalysis on 6/29 showed 2 numerous to count white blood cells, large leuk esterase, negative nitrite. White blood cell count on arrival was 7.13.  Hemoglobin was low at 10.8.  ProBNP was elevated to 14,663.  Troponin was slightly elevated to 0.127.  Pro-calcitonin was 0.34. Ferritin was elevated to 1646, CRP was 2.2, and LDH was 306 on 6/29/20. One out of 2 sets of admission blood cultures is growing coag-negative staph, likely a contaminant. COVID-19 PCR was positive. Respiratory PCR panel was negative. Repeat CRP today was 0.86.    She had a CT of  her chest on 6/29 when she was in the ER and this showed bilateral infiltrates concerning for a asymmetric pulmonary edema. Imaging features could be seen with COVID-19 pneumonia. Also with trace bilateral pleural effusions and cardiomegaly. CT head without contrast was without any acute findings. CT abdomen and pelvis showed colonic diverticulosis without definitive diverticulitis, uterine enlargement with possible fibroids, bilateral renal lesions probably all cysts however incompletely characterized.    The patient has been on vancomycin, Zosyn, and doxycycline for pneumonia coverage. The ID team has been consulted in the setting of COVID-19 infection/pneumonia.     Past Medical History:   Diagnosis Date   • Acute on chronic diastolic heart failure (CMS/HCC)    • Acute on chronic heart failure (CMS/Formerly [facility])    • Acute respiratory failure with hypercapnia (CMS/Formerly [facility]) 4/27/2019   • Anemia    • Anxiety    • Asthma    • Bilateral leg pain 10/4/2017   • Boil     on head    • Chest wall abscess 4/5/2019   • CHF (congestive heart failure) (CMS/Formerly [facility])    • Chronic diastolic heart failure (CMS/Formerly [facility])    • Chronic kidney disease     stage 4   • Constipation    • Coronary artery disease    • Diabetes mellitus (CMS/Formerly [facility])     checks sugar once per day    • Diabetes mellitus, type II (CMS/Formerly [facility]) 8/3/2018   • Dialysis patient (CMS/Formerly [facility])     mwf   • Dizzy    • DVT of upper extremity (deep vein thrombosis) (CMS/Formerly [facility])     left   • Elevated troponin 9/1/2019   • Encephalopathy, metabolic 4/27/2019   • Fecal impaction of rectum (CMS/Formerly [facility])    • Generalized weakness 7/31/2019   • GERD (gastroesophageal reflux disease)    • Headache    • History of Clostridium difficile infection 08/03/2018    h/o   • History of respiratory failure, since off home oxygen 8/3/2018   • History of UTI 8/3/2018   • Hyperkalemia 7/31/2019   • Hypertension    • Morbid obesity (CMS/[facility])    • Osteoarthritis    • Sleep apnea     doesnt use machine     • Slow transit  constipation 2019   • Tinea capitis 8/3/2018   • URI (upper respiratory infection) 2019   • Urinary tract infection due to extended-spectrum beta lactamase (ESBL)-producing Klebsiella 2019   • Urinary tract infection without hematuria 2019   • UTI (urinary tract infection), bacterial 10/4/2017   • Vertigo    • Volume overload 2019       Past Surgical History:   Procedure Laterality Date   • ARM LESION/CYST EXCISION N/A 2019    Procedure: ABSCESS DRAINAGE X2 ON BACK;  Surgeon: Carlos Enrique Calderón MD;  Location: [facility] OR;  Service: General   • ARTERIOVENOUS FISTULA/SHUNT SURGERY Left 10/22/2018    Procedure: LEFT UPPER EXTREMITY ARTERIOVENOUS FISTULA CREATION;  Surgeon: Carlos Enrique Calderón MD;  Location: [facility] OR;  Service: Vascular   • ARTERIOVENOUS FISTULA/SHUNT SURGERY Right 11/15/2019    Procedure: BRACHIOCEPHALIC AV FISTULA CREATION RIGHT;  Surgeon: Carlos Enrique Calderón MD;  Location: [facility] OR;  Service: Vascular   • CATARACT EXTRACTION      bilat    •  SECTION     • COLONOSCOPY N/A 2017    Procedure: COLONOSCOPY;  Surgeon: Mark I Brunner, MD;  Location: [facility] ENDOSCOPY;  Service:    • ENDOSCOPY N/A 2017    Procedure: ESOPHAGOGASTRODUODENOSCOPY ;  Surgeon: Velasquez Call MD;  Location: [facility] ENDOSCOPY;  Service:    • HERNIA REPAIR      umbilical hernia unsure about mesh    • INSERTION HEMODIALYSIS CATHETER Right 10/17/2018    Procedure: HEMODIALYSIS CATHETER INSERTION;  Surgeon: Carlos Enrique Calderón MD;  Location: [facility] OR;  Service: General   • TONSILLECTOMY         Pediatric History   Patient Guardian Status   • Not on file     Other Topics Concern   • Not on file   Social History Narrative    Mrs. Bueno is a 68 year old AA  female. She lives alone in [place] but has been in rehab for some time. She has a daughter. She does not have an advanced directive.     Family history:  family history includes Cardiomyopathy in her mother;  Diabetes in her father; Stroke in her father.    Allergies   Allergen Reactions   • Geodon [Ziprasidone Hcl] Unknown (See Comments)     PT DOESN'T REMEMBER   • Haldol [Haloperidol Lactate] Unknown (See Comments)     PT DOESN'T REMEMBER   • Seroquel [Quetiapine Fumarate] Unknown (See Comments)     PT DOESN'T REMEMBER       Medication:  Current Facility-Administered Medications   Medication Dose Route Frequency Provider Last Rate Last Dose   • acetaminophen (TYLENOL) tablet 650 mg  650 mg Oral Q6H PRN Nanda Gonzalez DO   650 mg at 06/29/20 1551   • albumin human 25 % IV SOLN 25 g  25 g Intravenous PRN Bhaskar Trevino MD   25 g at 07/01/20 0815   • aspirin chewable tablet 81 mg  81 mg Oral Daily Nanda Gonzalez DO   81 mg at 07/01/20 1110   • atorvastatin (LIPITOR) tablet 80 mg  80 mg Oral Nightly Nanda Gonzalez DO   80 mg at 06/30/20 2056   • budesonide-formoterol (SYMBICORT) 160-4.5 MCG/ACT inhaler 2 puff  2 puff Inhalation BID - RT Nanda Gonzalez DO   2 puff at 07/01/20 1006   • calcitriol (ROCALTROL) capsule 0.25 mcg  0.25 mcg Oral Daily Nanda Gonzalez DO   0.25 mcg at 07/01/20 1112   • carvedilol (COREG) tablet 6.25 mg  6.25 mg Oral BID With Meals Irineo Latham MD       • dextrose (D50W) 25 g/ 50mL Intravenous Solution 25 g  25 g Intravenous Q15 Min PRN Nanda Gonzalez, DO       • dextrose (GLUTOSE) oral gel 15 g  15 g Oral Q15 Min PRN Nanda Gonzalez, DO       • docusate sodium (COLACE) capsule 100 mg  100 mg Oral BID Nanda Gonzalez DO   100 mg at 06/29/20 1003   • doxycycline (MONODOX) capsule 100 mg  100 mg Oral Q12H Nanda Gonzalez DO   100 mg at 07/01/20 1113   • glucagon (human recombinant) (GLUCAGEN DIAGNOSTIC) injection 1 mg  1 mg Subcutaneous Q15 Min PRN Nanda Gonzalez, DO       • heparin (porcine) injection 1,800 Units  1,800 Units Intracatheter PRN Bhaskar Trevino MD   1,800 Units at 07/01/20 1123   • HYDROcodone-acetaminophen (NORCO) 5-325 MG per tablet 1 tablet  1  tablet Oral Q4H PRN Nanda Gonzalez DO   1 tablet at 06/29/20 1745   • Hydrocortisone (Perianal) (ANUSOL-HC) 2.5 % rectal cream   Rectal BID Nanda Gonzalez DO       • insulin lispro (humaLOG) injection 0-7 Units  0-7 Units Subcutaneous TID AC Nanda Gonzalez DO   2 Units at 06/30/20 1722   • isosorbide mononitrate (IMDUR) 24 hr tablet 30 mg  30 mg Oral Daily Nanda Gonzalez DO   30 mg at 06/30/20 0844   • nystatin (MYCOSTATIN) powder   Topical Q12H Nanda Gonzalez DO       • ondansetron (ZOFRAN) injection 4 mg  4 mg Intravenous Q6H PRN Nanda Gonzalez DO   4 mg at 07/01/20 0446   • pantoprazole (PROTONIX) EC tablet 40 mg  40 mg Oral Q AM Nanda Gonzalez DO   40 mg at 07/01/20 1114   • Pharmacy to Dose - Warfarin   Does not apply Continuous PRN Nanda Gonzalez DO       • piperacillin-tazobactam (ZOSYN) 3.375 g in iso-osmotic dextrose 50 ml (premix)  3.375 g Intravenous Q12H Nanda Gonzalez DO   3.375 g at 07/01/20 1724   • polyethylene glycol (MIRALAX) packet 17 g  17 g Oral BID PRN Nanda Gonzalez DO       • saccharomyces boulardii (FLORASTOR) capsule 250 mg  250 mg Oral Daily Nanda Gonzalez DO   250 mg at 07/01/20 1114   • sertraline (ZOLOFT) tablet 50 mg  50 mg Oral Daily Nanda Gonzalez DO   50 mg at 07/01/20 1115   • sevelamer (RENVELA) packet 0.8 g  800 mg Oral TID With Meals Nanda Gonzalez DO   0.8 g at 07/01/20 1122   • sodium chloride 0.9 % flush 10 mL  10 mL Intravenous Q12H Nanda Gonzalez DO   10 mL at 07/01/20 1115   • sodium chloride 0.9 % flush 10 mL  10 mL Intravenous PRN Nanda Gonzalez DO       • warfarin (COUMADIN) (dosing per levels)   Does not apply Daily Andrez Orantes, [facility]             Infectious History:  COVID (confirmed)    Antibiotics:  Anti-Infectives (From admission, onward)    Ordered     Dose/Rate Route Frequency Start Stop    06/29/20 0811  piperacillin-tazobactam (ZOSYN) 3.375 g in iso-osmotic dextrose 50 ml (premix)     Rolanda  "Andrez BAPTISTE [facility] reviewed the order on 07/01/20 1400.   Ordering Provider:  Nanda Gonzalez DO    3.375 g  over 4 Hours Intravenous Every 12 Hours 06/29/20 1800 07/04/20 1759    06/29/20 0820  doxycycline (MONODOX) capsule 100 mg     Andrez Orantes [facility] reviewed the order on 07/01/20 1400.   Ordering Provider:  Nanda Gonzalez DO    100 mg Oral Every 12 Hours Scheduled 06/29/20 0900 07/04/20 0859    06/29/20 0537  piperacillin-tazobactam (ZOSYN) 4.5 g in iso-osmotic dextrose 100 mL IVPB (premix)     Ordering Provider:  Rocky Yanez IV, [facility]    4.5 g  over 30 Minutes Intravenous Once 06/29/20 0539 06/29/20 0721    06/29/20 0536  vancomycin 3000 mg/500 mL 0.9% NS IVPB ([facility])     Ordering Provider:  Kirt Ley DO    20 mg/kg × 147 kg  over 180 Minutes Intravenous Once 06/29/20 0538 06/29/20 0951            Review of Systems    Unable to obtain a full 12 point ROS from the patient as she is currently somnolent on BIPAP and no reliable history    Physical Exam:   Vital Signs   /62 (BP Location: Left arm, Patient Position: Sitting)   Pulse 78   Temp 97.2 °F (36.2 °C) (Axillary)   Resp 18   Ht 162.6 cm (64\")   Wt (!) 142 kg (313 lb 12.8 oz)   SpO2 99%   BMI 53.86 kg/m²     In the setting of the current COVID-19 pandemic and a critical shortage and PPE, the patient was examined through the window in an effort to save PPE and reduce healthcare exposure. Telephone conferencing attempted but the patient is current somnolent on BIPAP and cannot communicate well.      GENERAL: AAW. morbid obesity. On BIPAP. somnolent  HENT: Normocephalic, atraumatic. BIPAP mask in place  HEART: RRR on monitor  LUNGS: non-labored breathing on BIPAP  ABDOMEN: obese abdomen.   EXT:  All extremities present  :  Without Yousif catheter.  MSK: no joint effusions noted.   SKIN: no rashes noted over exposed skin.   NEURO: somnolent and no responsive. Does not wake up to the phone call.   PSYCHIATRIC: unable to " assess    Laboratory Data    Results from last 7 days   Lab Units 07/01/20  1406 07/01/20  0254 06/30/20  1615 06/30/20  0352  06/29/20  0530   WBC 10*3/mm3  --   --   --  6.72  --  7.15   HEMOGLOBIN g/dL 7.7* 7.9* 7.7* 7.8*  7.8*   < > 10.8*   HEMATOCRIT % 26.6* 25.8* 25.5* 26.5*  26.5*   < > 36.4   PLATELETS 10*3/mm3  --   --   --  217  --  251    < > = values in this interval not displayed.     Results from last 7 days   Lab Units 07/01/20  1406   SODIUM mmol/L 140   POTASSIUM mmol/L 3.7   CHLORIDE mmol/L 101   CO2 mmol/L 29.0   BUN mg/dL 25*   CREATININE mg/dL 2.85*   GLUCOSE mg/dL 131*   CALCIUM mg/dL 8.7     Results from last 7 days   Lab Units 06/29/20  0530   ALK PHOS U/L 94   BILIRUBIN mg/dL 0.5   ALT (SGPT) U/L 10   AST (SGOT) U/L 15         Results from last 7 days   Lab Units 07/01/20  1406   CRP mg/dL 0.86*       Estimated Creatinine Clearance: 26.4 mL/min (A) (by C-G formula based on SCr of 2.85 mg/dL (H)).       Microbiology:    Blood Culture   Date Value Ref Range Status   06/29/2020 No growth at 2 days  Preliminary   06/29/2020 Staphylococcus, coagulase negative (C)  Final     Comment:     Probable contaminant requires clinical correlation, susceptibility not performed unless requested by physician.         Radiology:  Ct Abdomen Pelvis Without Contrast    Result Date: 6/29/2020  1. Technically degraded exam as above. 2. Colonic diverticulosis without definite diverticulitis. No bowel obstruction is seen. 3. Uterine enlargement with probable fibroids. 4. No renal or ureteral stones. No hydronephrosis. 5. Bilateral renal lesions, probably all cysts. However, these are incompletely characterized. Consider nonemergent follow-up with renal protocol CT or MRI with and without contrast when clinically feasible. 6. Additional findings as above. Signer Name: Ricardo Pate MD  Signed: 6/29/2020 5:34 AM  Workstation Name: [facility]  [facility]    Ct Head Without  Contrast    Result Date: 6/29/2020  1. Motion degraded exam, but no definite acute findings in the brain. 2. Mucosal thickening in the ethmoid air cells and in the nasal cavity. There is also a right mastoid effusion. Signer Name: Ricardo Pate MD  Signed: 6/29/2020 5:23 AM  Workstation Name: [facility]  Radiology [facility]    Ct Chest Without Contrast    Result Date: 6/29/2020  1. Bilateral infiltrates as above concerning for asymmetric pulmonary edema. Pneumonia not completely excluded. Imaging features can be seen with COVID-19 pneumonia, although are nonspecific and can can occur with a variety of infectious and noninfectious processes. 2. Trace bilateral pleural effusions. 3. Cardiomegaly. Signer Name: Ricardo Pate MD  Signed: 6/29/2020 5:27 AM  Workstation Name: [facility]  Radiology [facility]     I independently read the patient's CT chest from 6/29/20-I agree with the above report.    Impression:   Joy Bueno is a 70 y.o.  Yr old female with history of ESRD on HD M/W/F, coronary artery disease, diabetes mellitus type 2, history of a DVT on Coumadin, history of CVA, morbid obesity, and obstructive sleep apnea who presented to [facility] on 6/29 with complaints of nose bleeding and hemoptysis. Found to have some anemia.  Also with acute hypoxic/hypercapnic respiratory failure with some signs of volume overload on imaging and significantly elevated proBNP level in the setting of missing her dialysis sessions prior to admission.  She has remained afebrile throughout her admission and although some of her inflammatory markers were elevated, her CRP is trending down.  Her O2 status has fluctuated between room air and 4 L, most recently on 2 L nasal cannula.  COVID-19 test is positive and she had apparently tested positive about one week prior to admission.      Problems:  -COVID-19 pneumonia  -Volume overload due to missed dialysis sessions  -Coag negative  staph in 1 out of 2 blood culture sets from admission-represents a contaminant  -Acute hypoxic/hypercapnic respiratory failure  -Obstructive sleep apnea/possible obesity hypoventilation syndrome  -Epistaxis and acute blood loss anemia-anticoagulation held  -ESRD on HD  -History of DVT on chronic anticoagulation with Coumadin  -Diabetes mellitus with hyperglycemia  -Hyperlipidemia  -Essential hypertension  -history of coronary artery disease  -History of CVA    PLAN: Thank you for asking us to see Jyo Bueno, I recommend the following:     -Monitor her CBC with differential, CRP, LDH, and ferritin daily for now. CRP has improved    -she is not a candidate for Remdesivir due to ESRD    -if she clinically worsens or inflammatory markers are uptrending that we could consider Decadron 6mg PO Daily x 10 days or until hypoxia improved. The downside to this would be that it would be more difficult to control her glucoses in the setting of her diabetes.    -We could additionally consider convalescent plasma through the [facility] Program if the patient declines.  We would need to give her plasma during a dialysis session as there is some risk for further fluid overload with convalescent plasma.    -doubt bacterial pneumonia based on imaging, normal white blood cell count, relatively normal procalcitonin,and no fevers- I would be okay with monitoring off antibiotics at this time. Stop doxycycline, vancomycin, and Zosyn.    I discussed her case with nursing today  I discussed her case with Dr. Briseno today.    Complex set of medical issues requiring a high level of medical decision-making.    Jesus Olivo MD  7/1/2020

## 2020-07-01 NOTE — PLAN OF CARE
Problem: Patient Care Overview  Goal: Plan of Care Review  Outcome: Ongoing (interventions implemented as appropriate)  Flowsheets (Taken 7/1/2020 0610)  Progress: no change  Plan of Care Reviewed With: patient  Note:   VSS and pt is NSR on telemetry.  She has been on room air all this shift. She has been c/o nausea with zofran given IV twice. Bowel movements still look bloody.  We will continue to monitor.

## 2020-07-01 NOTE — PROGRESS NOTES
[facility] Medicine Services  PROGRESS NOTE    Patient Name: Joy Bueno  : 1950  MRN: [id number]    Date of Admission: 2020  Primary Care Physician: Goldy Dior MD    Subjective   Subjective     CC:  Follow-up shortness of breath and COVID-19    HPI:  Patient with some somnolence today.  Feels very tired after dialysis.  Says she is tired.  Oriented to name and location but has difficulty with the date.  Breathing stable.  No other new complaints.  No further nosebleeding.    Review of Systems  No current fevers or chills  No current dysuria or hematuria  No current nausea, vomiting, or diarrhea  No current chest pain or palpitations      Objective   Objective     Vital Signs:   Temp:  [97.2 °F (36.2 °C)-98.3 °F (36.8 °C)] 97.9 °F (36.6 °C)  Heart Rate:  [59-90] 78  Resp:  [16-20] 16  BP: ()/(41-60) 108/54        Physical Exam:  Constitutional:Awake, alert  HENT: NCAT, mucous membranes moist, neck supple  Respiratory: Occasional cough, no audible wheezes, currently on supplemental oxygen, rate relatively normal  Cardiovascular: RRR, normal radial pulses  Gastrointestinal: Positive bowel sounds, soft, nontender, nondistended  Musculoskeletal: Elderly and chronically debilitated in appearance, severe morbid obesity, some lower extremity edema, BMI 53  Psychiatric: Appropriate affect, cooperative, conversational  Neurologic: No slurred speech or facial droop, follows commands, oriented to name and location  Skin: No rashes or jaundice, warm      Results Reviewed:  Results from last 7 days   Lab Units 20  0254 20  1615 20  0930 20  0352  20  0530   WBC 10*3/mm3  --   --   --  6.72  --  7.15   HEMOGLOBIN g/dL 7.9* 7.7*  --  7.8*  7.8*   < > 10.8*   HEMATOCRIT % 25.8* 25.5*  --  26.5*  26.5*   < > 36.4   PLATELETS 10*3/mm3  --   --   --  217  --  251   INR   --   --  4.64*  --   --  3.97*   PROCALCITONIN ng/mL  --   --   --   --    --  0.32*  0.34*    < > = values in this interval not displayed.     Results from last 7 days   Lab Units 06/30/20  0352 06/30/20  0048 06/29/20  1711 06/29/20  1137 06/29/20  0530   SODIUM mmol/L 140  --   --   --  140   POTASSIUM mmol/L 3.9  --   --   --  4.3   CHLORIDE mmol/L 98  --   --   --  98   CO2 mmol/L 27.0  --   --   --  27.0   BUN mg/dL 52*  --   --   --  88*   CREATININE mg/dL 4.81*  --   --   --  7.17*   GLUCOSE mg/dL 185*  --   --   --  161*   CALCIUM mg/dL 7.8*  --   --   --  7.7*   ALT (SGPT) U/L  --   --   --   --  10   AST (SGOT) U/L  --   --   --   --  15   TROPONIN T ng/mL  --  0.108* 0.103* 0.108* 0.127*   PROBNP pg/mL  --   --   --   --  14,663.0*     Estimated Creatinine Clearance: 15.6 mL/min (A) (by C-G formula based on SCr of 4.81 mg/dL (H)).    Microbiology Results Abnormal     Procedure Component Value - Date/Time    Blood Culture - Blood, Arm, Left [[id number]] Collected:  06/29/20 0650    Lab Status:  Preliminary result Specimen:  Blood from Arm, Left Updated:  07/01/20 0815     Blood Culture No growth at 2 days    Blood Culture - Blood, Arm, Left [[id number]]  (Abnormal) Collected:  06/29/20 0630    Lab Status:  Final result Specimen:  Blood from Arm, Left Updated:  07/01/20 0624     Blood Culture Staphylococcus, coagulase negative     Comment: Probable contaminant requires clinical correlation, susceptibility not performed unless requested by physician.          Isolated from Aerobic Bottle     Gram Stain Aerobic Bottle Gram positive cocci in pairs and clusters    Blood Culture ID, PCR - Blood, Arm, Left [[id number]]  (Abnormal) Collected:  06/29/20 0630    Lab Status:  Final result Specimen:  Blood from Arm, Left Updated:  06/30/20 0918     BCID, PCR Staphylococcus spp, not aureus. Identification by BCID PCR.    COVID-19, [facility], NP SWAB IN TRANSPORT MEDIA 8-12 HR TAT - Swab, Nasopharynx [[id number]]  (Abnormal) Collected:  06/29/20 0814    Lab Status:  Final result Specimen:   Swab from Nasopharynx Updated:  06/29/20 1345     COVID19 Detected    Narrative:       Fact sheet for providers: https://www.fda.gov/media/145422/download     Fact sheet for patients: https://www.fda.gov/media/655370/download    Respiratory Panel, PCR - Swab, Nasopharynx [[id number]]  (Normal) Collected:  06/29/20 0814    Lab Status:  Final result Specimen:  Swab from Nasopharynx Updated:  06/29/20 1029     ADENOVIRUS, PCR Not Detected     Coronavirus 229E Not Detected     Coronavirus HKU1 Not Detected     Coronavirus NL63 Not Detected     Coronavirus OC43 Not Detected     Human Metapneumovirus Not Detected     Human Rhinovirus/Enterovirus Not Detected     Influenza B PCR Not Detected     Parainfluenza Virus 1 Not Detected     Parainfluenza Virus 2 Not Detected     Parainfluenza Virus 3 Not Detected     Parainfluenza Virus 4 Not Detected     Bordetella pertussis pcr Not Detected     Influenza A H1 2009 PCR Not Detected     Chlamydophila pneumoniae PCR Not Detected     Mycoplasma pneumo by PCR Not Detected     Influenza A PCR Not Detected     Influenza A H3 Not Detected     Influenza A H1 Not Detected     RSV, PCR Not Detected     Bordetella parapertussis PCR Not Detected    Narrative:       The coronavirus on the RVP is NOT COVID-19 and is NOT indicative of infection with COVID-19.           Imaging Results (Last 24 Hours)     ** No results found for the last 24 hours. **          Results for orders placed during the hospital encounter of 09/29/19   Adult Transthoracic Echo Complete W/ Cont if Necessary Per Protocol    Narrative · Estimated EF = 60%.  · Left ventricular systolic function is normal.          I have reviewed the medications:  Scheduled Meds:  aspirin 81 mg Oral Daily   atorvastatin 80 mg Oral Nightly   budesonide-formoterol 2 puff Inhalation BID - RT   calcitriol 0.25 mcg Oral Daily   carvedilol 6.25 mg Oral BID With Meals   docusate sodium 100 mg Oral BID   doxycycline 100 mg Oral Q12H    Hydrocortisone (Perianal)  Rectal BID   insulin lispro 0-7 Units Subcutaneous TID AC   isosorbide mononitrate 30 mg Oral Daily   nystatin  Topical Q12H   pantoprazole 40 mg Oral Q AM   piperacillin-tazobactam 3.375 g Intravenous Q12H   saccharomyces boulardii 250 mg Oral Daily   sertraline 50 mg Oral Daily   sevelamer 800 mg Oral TID With Meals   sodium chloride 10 mL Intravenous Q12H   warfarin (COUMADIN) (dosing per levels)  Does not apply Daily     Continuous Infusions:  Pharmacy Consult      PRN Meds:.•  acetaminophen  •  albumin human  •  dextrose  •  dextrose  •  glucagon (human recombinant)  •  heparin (porcine)  •  HYDROcodone-acetaminophen  •  ondansetron  •  Pharmacy Consult  •  polyethylene glycol  •  sodium chloride    Assessment/Plan   Assessment & Plan     Active Hospital Problems    Diagnosis  POA   • **Volume overload [E87.70]  Yes   • Pneumonia [J18.9]  Yes   • Epistaxis [R04.0]  Yes   • Hemorrhoids [K64.9]  Yes   • COVID-19 positive [U07.1]  Yes   • Supratherapeutic INR [R79.1]  Yes   • Anticoagulated on Coumadin [Z79.01]  Not Applicable   • History of deep vein thrombosis (DVT) of brachial vein of left upper extremity (CMS/[facility]) [I82.622]  Yes   • Chronic diastolic heart failure (CMS/[facility]) [I50.32]  Yes   • ESRD (end stage renal disease) (CMS/[facility]) [N18.6]  Yes   • Acute respiratory failure with hypoxia (CMS/[facility]) [J96.01]  Yes   • Essential hypertension [I10]  Yes   • Morbid obesity (CMS/[facility]) [E66.01]  Yes      Resolved Hospital Problems   No resolved problems to display.        Brief Hospital Course to date:  Joy Bueno is a 70 y.o. female who is a nursing home resident of [facility] with past medical history is significant for severe morbid obesity, end-stage renal disease on hemodialysis Monday Wednesday Friday, coronary artery disease, diabetes, chronic anticoagulation for history of DVT, stroke, obstructive sleep apnea, and chronic hemorrhoids and anal fissure who presents  to [facility] with complaint of hemoptysis and headache.  She was found to have acute respiratory failure with hypoxia, volume overload secondary to multiple missed dialysis sessions, and was positive for COVID-19.    Discussion/plan: CT images scan reviewed and edema noted.  Case discussed with infectious disease consult team who will evaluate and make further determinations.  We will need to monitor to see if plasma and antiviral therapy needs to be considered as well as steroids.  There are risks versus benefits on these treatments.  Initiate BiPAP today.  ABG noted with hypercapnia.  Plan to monitor to see if this improves lethargy.  No further bleeding but continue to trend.  Repeat laboratory studies tomorrow.  Complex case.  Patient felt to be high risk due to COVID-19 and significant comorbid conditions.    Acute respiratory failure with hypoxia and hypercapnia  COVID-19 positive with pneumonia  Volume overload secondary to missed dialysis  Obstructive sleep apnea  Possible obesity hypoventilation syndrome  CT chest notable for findings consistent with pneumonia and volume overload.  Consult infectious disease to assist with COVID management  Probiotic  Consider discontinuing antibiotics if ID agreeable.  BiPAP for hypercapnia intermittently and with sleep.    Epistaxis causing hemoptysis with acute blood loss anemia  Supratherapeutic INR  Bleeding stopped, suspect epistaxis causing hemoptysis with patient coughing up blood.  Significant blood loss noted with epistaxis.  No GI bleed noted.  Rhino Rocket has been removed.  Monitor for further episodes and monitor anemia.  Transfuse if needed.    Fecal occult positives, hemorrhoids, anemia of chronic kidney disease and chronic disease:  Hemoglobin appears to be stabilizing.  Transfuse as needed.  No further bleeding noted.  Continue to monitor.    History of DVT on chronic coagulation with warfarin:  Dosing per pharmacy.  Hold initially due to  supratherapeutic INR.    Essential hypertension:  Carvedilol dose decreased.  Hold as needed.    Hyperlipidemia: Stable.  Statin.    Diabetes: Monitor glucose.  Correction insulin.    End-stage renal disease on hemodialysis: Nephrology following for dialysis.    DVT Prophylaxis: Anticoagulation    Discharge planning: Pending improvement.    CODE STATUS:   Code Status and Medical Interventions:   Ordered at: 06/29/20 0804     Level Of Support Discussed With:    Patient     Code Status:    CPR     Medical Interventions (Level of Support Prior to Arrest):    Full         Electronically signed by Trevon Briseno MD, 07/01/20, 13:55.

## 2020-07-01 NOTE — PLAN OF CARE
HD in progress.    Problem: Hemodialysis (Adult)  Goal: Signs and Symptoms of Listed Potential Problems Will be Absent, Minimized or Managed (Hemodialysis)  Outcome: Ongoing (interventions implemented as appropriate)  Flowsheets (Taken 7/1/2020 0798)  Problems Assessed (Hemodialysis): all  Problems Present (Hemodialysis): electrolyte imbalance; fluid imbalance; cardiovascular complications; situational response

## 2020-07-02 LAB
ANION GAP SERPL CALCULATED.3IONS-SCNC: 12 MMOL/L (ref 5–15)
BUN SERPL-MCNC: 33 MG/DL (ref 8–23)
BUN/CREAT SERPL: 7.6 (ref 7–25)
CALCIUM SPEC-SCNC: 9 MG/DL (ref 8.6–10.5)
CHLORIDE SERPL-SCNC: 103 MMOL/L (ref 98–107)
CO2 SERPL-SCNC: 29 MMOL/L (ref 22–29)
CREAT SERPL-MCNC: 4.34 MG/DL (ref 0.57–1)
DEPRECATED RDW RBC AUTO: 67 FL (ref 37–54)
ERYTHROCYTE [DISTWIDTH] IN BLOOD BY AUTOMATED COUNT: 16.5 % (ref 12.3–15.4)
GFR SERPL CREATININE-BSD FRML MDRD: 12 ML/MIN/1.73
GFR SERPL CREATININE-BSD FRML MDRD: ABNORMAL ML/MIN/{1.73_M2}
GLUCOSE BLDC GLUCOMTR-MCNC: 174 MG/DL (ref 70–130)
GLUCOSE BLDC GLUCOMTR-MCNC: 179 MG/DL (ref 70–130)
GLUCOSE BLDC GLUCOMTR-MCNC: 96 MG/DL (ref 70–130)
GLUCOSE SERPL-MCNC: 164 MG/DL (ref 65–99)
HCT VFR BLD AUTO: 29.6 % (ref 34–46.6)
HGB BLD-MCNC: 7.9 G/DL (ref 12–15.9)
INR PPP: 3.6 (ref 0.85–1.16)
MCH RBC QN AUTO: 29.4 PG (ref 26.6–33)
MCHC RBC AUTO-ENTMCNC: 26.7 G/DL (ref 31.5–35.7)
MCV RBC AUTO: 110 FL (ref 79–97)
PLATELET # BLD AUTO: 191 10*3/MM3 (ref 140–450)
PMV BLD AUTO: 10 FL (ref 6–12)
POTASSIUM SERPL-SCNC: 4.1 MMOL/L (ref 3.5–5.2)
PROTHROMBIN TIME: 35.7 SECONDS (ref 11.5–14)
RBC # BLD AUTO: 2.69 10*6/MM3 (ref 3.77–5.28)
SODIUM SERPL-SCNC: 144 MMOL/L (ref 136–145)
WBC # BLD AUTO: 6.46 10*3/MM3 (ref 3.4–10.8)

## 2020-07-02 PROCEDURE — 94799 UNLISTED PULMONARY SVC/PX: CPT

## 2020-07-02 PROCEDURE — 82962 GLUCOSE BLOOD TEST: CPT

## 2020-07-02 PROCEDURE — 99233 SBSQ HOSP IP/OBS HIGH 50: CPT | Performed by: INTERNAL MEDICINE

## 2020-07-02 PROCEDURE — 63710000001 INSULIN LISPRO (HUMAN) PER 5 UNITS: Performed by: INTERNAL MEDICINE

## 2020-07-02 PROCEDURE — 80048 BASIC METABOLIC PNL TOTAL CA: CPT | Performed by: INTERNAL MEDICINE

## 2020-07-02 PROCEDURE — 85027 COMPLETE CBC AUTOMATED: CPT | Performed by: INTERNAL MEDICINE

## 2020-07-02 PROCEDURE — 85610 PROTHROMBIN TIME: CPT

## 2020-07-02 RX ORDER — CARVEDILOL 3.12 MG/1
3.12 TABLET ORAL 2 TIMES DAILY WITH MEALS
Status: DISCONTINUED | OUTPATIENT
Start: 2020-07-02 | End: 2020-07-03

## 2020-07-02 RX ADMIN — NYSTATIN 1 APPLICATION: 100000 POWDER TOPICAL at 08:42

## 2020-07-02 RX ADMIN — CALCITRIOL CAPSULES 0.25 MCG 0.25 MCG: 0.25 CAPSULE ORAL at 12:17

## 2020-07-02 RX ADMIN — Medication 250 MG: at 08:41

## 2020-07-02 RX ADMIN — HYDROCORTISONE: 25 CREAM TOPICAL at 21:59

## 2020-07-02 RX ADMIN — HYDROCORTISONE 1 APPLICATION: 25 CREAM TOPICAL at 13:58

## 2020-07-02 RX ADMIN — SEVELAMER CARBONATE 0.8 G: 800 POWDER, FOR SUSPENSION ORAL at 08:41

## 2020-07-02 RX ADMIN — BUDESONIDE AND FORMOTEROL FUMARATE DIHYDRATE 2 PUFF: 160; 4.5 AEROSOL RESPIRATORY (INHALATION) at 19:27

## 2020-07-02 RX ADMIN — ATORVASTATIN CALCIUM 80 MG: 40 TABLET, FILM COATED ORAL at 21:59

## 2020-07-02 RX ADMIN — BUDESONIDE AND FORMOTEROL FUMARATE DIHYDRATE 2 PUFF: 160; 4.5 AEROSOL RESPIRATORY (INHALATION) at 09:11

## 2020-07-02 RX ADMIN — PANTOPRAZOLE SODIUM 40 MG: 40 TABLET, DELAYED RELEASE ORAL at 08:41

## 2020-07-02 RX ADMIN — SEVELAMER CARBONATE 0.8 G: 800 POWDER, FOR SUSPENSION ORAL at 12:17

## 2020-07-02 RX ADMIN — ASPIRIN 81 MG CHEWABLE TABLET 81 MG: 81 TABLET CHEWABLE at 08:41

## 2020-07-02 RX ADMIN — CARVEDILOL 3.12 MG: 3.12 TABLET, FILM COATED ORAL at 17:45

## 2020-07-02 RX ADMIN — SERTRALINE 50 MG: 50 TABLET, FILM COATED ORAL at 08:41

## 2020-07-02 RX ADMIN — INSULIN LISPRO 2 UNITS: 100 INJECTION, SOLUTION INTRAVENOUS; SUBCUTANEOUS at 17:45

## 2020-07-02 RX ADMIN — NYSTATIN: 100000 POWDER TOPICAL at 21:59

## 2020-07-02 RX ADMIN — ACETAMINOPHEN 650 MG: 325 TABLET, FILM COATED ORAL at 23:13

## 2020-07-02 RX ADMIN — INSULIN LISPRO 2 UNITS: 100 INJECTION, SOLUTION INTRAVENOUS; SUBCUTANEOUS at 12:18

## 2020-07-02 RX ADMIN — SEVELAMER CARBONATE 0.8 G: 800 POWDER, FOR SUSPENSION ORAL at 17:45

## 2020-07-02 RX ADMIN — SODIUM CHLORIDE, PRESERVATIVE FREE 10 ML: 5 INJECTION INTRAVENOUS at 22:00

## 2020-07-02 RX ADMIN — ACETAMINOPHEN 650 MG: 325 TABLET, FILM COATED ORAL at 15:35

## 2020-07-02 NOTE — PROGRESS NOTES
[facility] Medicine Services  PROGRESS NOTE    Patient Name: Joy Bueno  : 1950  MRN: [id number]    Date of Admission: 2020  Primary Care Physician: Goldy Dior MD    Subjective   Subjective     CC:  Follow-up shortness of breath and COVID-19    HPI:  Patient much more awake today.  Does note some shortness of breath but is currently on room air oxygen satting 95%.  Feels much more awake today.  Denies further nosebleeding.  Nursing notes she woke up much more in the night after significant rest but was lethargic yesterday afternoon.      Review of Systems  No current fevers or chills  No current dysuria or hematuria  No current nausea, vomiting, or diarrhea  No current chest pain or palpitations      Objective   Objective     Vital Signs:   Temp:  [97.2 °F (36.2 °C)-98.3 °F (36.8 °C)] 98 °F (36.7 °C)  Heart Rate:  [59-81] 64  Resp:  [16-20] 20  BP: ()/(41-70) 128/70        Physical Exam:  Constitutional:Awake, alert  HENT: NCAT, mucous membranes moist, neck supple  Respiratory: Minimal cough, no audible wheezes, currently on room air, rate relatively normal  Cardiovascular: RRR, normal radial pulses  Gastrointestinal: Positive bowel sounds, soft, nontender, nondistended  Musculoskeletal: Elderly and chronically debilitated in appearance, severe morbid obesity, some lower extremity edema, BMI 53  Psychiatric: Appropriate affect, cooperative, conversational  Neurologic: No slurred speech or facial droop, follows commands, oriented to name and location  Skin: No rashes or jaundice, warm      Results Reviewed:  Results from last 7 days   Lab Units 20  1406 20  0254 20  1615 20  0930 20  0352  20  0530   WBC 10*3/mm3  --   --   --   --  6.72  --  7.15   HEMOGLOBIN g/dL 7.7* 7.9* 7.7*  --  7.8*  7.8*   < > 10.8*   HEMATOCRIT % 26.6* 25.8* 25.5*  --  26.5*  26.5*   < > 36.4   PLATELETS 10*3/mm3  --   --   --   --  217  --  258    INR  3.65*  --   --  4.64*  --   --  3.97*   PROCALCITONIN ng/mL  --   --   --   --   --   --  0.32*  0.34*    < > = values in this interval not displayed.     Results from last 7 days   Lab Units 07/01/20  1406 06/30/20  0352 06/30/20  0048 06/29/20  1711 06/29/20  1137 06/29/20  0530   SODIUM mmol/L 140 140  --   --   --  140   POTASSIUM mmol/L 3.7 3.9  --   --   --  4.3   CHLORIDE mmol/L 101 98  --   --   --  98   CO2 mmol/L 29.0 27.0  --   --   --  27.0   BUN mg/dL 25* 52*  --   --   --  88*   CREATININE mg/dL 2.85* 4.81*  --   --   --  7.17*   GLUCOSE mg/dL 131* 185*  --   --   --  161*   CALCIUM mg/dL 8.7 7.8*  --   --   --  7.7*   ALT (SGPT) U/L  --   --   --   --   --  10   AST (SGOT) U/L  --   --   --   --   --  15   TROPONIN T ng/mL  --   --  0.108* 0.103* 0.108* 0.127*   PROBNP pg/mL  --   --   --   --   --  14,663.0*     Estimated Creatinine Clearance: 26.5 mL/min (A) (by C-G formula based on SCr of 2.85 mg/dL (H)).    Microbiology Results Abnormal     Procedure Component Value - Date/Time    Blood Culture - Blood, Arm, Left [[id number]] Collected:  06/29/20 0650    Lab Status:  Preliminary result Specimen:  Blood from Arm, Left Updated:  07/01/20 0815     Blood Culture No growth at 2 days    Blood Culture - Blood, Arm, Left [[id number]]  (Abnormal) Collected:  06/29/20 0630    Lab Status:  Final result Specimen:  Blood from Arm, Left Updated:  07/01/20 0624     Blood Culture Staphylococcus, coagulase negative     Comment: Probable contaminant requires clinical correlation, susceptibility not performed unless requested by physician.          Isolated from Aerobic Bottle     Gram Stain Aerobic Bottle Gram positive cocci in pairs and clusters    Blood Culture ID, PCR - Blood, Arm, Left [[id number]]  (Abnormal) Collected:  06/29/20 0630    Lab Status:  Final result Specimen:  Blood from Arm, Left Updated:  06/30/20 0918     BCID, PCR Staphylococcus spp, not aureus. Identification by BCID PCR.    COVID-19,[facility], NP SWAB IN TRANSPORT MEDIA 8-12 HR TAT - Swab, Nasopharynx [[id number]]  (Abnormal) Collected:  06/29/20 0814    Lab Status:  Final result Specimen:  Swab from Nasopharynx Updated:  06/29/20 1345     COVID19 Detected    Narrative:       Fact sheet for providers: https://www.fda.gov/media/696727/download     Fact sheet for patients: https://www.fda.gov/media/149079/download    Respiratory Panel, PCR - Swab, Nasopharynx [[id number]]  (Normal) Collected:  06/29/20 0814    Lab Status:  Final result Specimen:  Swab from Nasopharynx Updated:  06/29/20 1029     ADENOVIRUS, PCR Not Detected     Coronavirus 229E Not Detected     Coronavirus HKU1 Not Detected     Coronavirus NL63 Not Detected     Coronavirus OC43 Not Detected     Human Metapneumovirus Not Detected     Human Rhinovirus/Enterovirus Not Detected     Influenza B PCR Not Detected     Parainfluenza Virus 1 Not Detected     Parainfluenza Virus 2 Not Detected     Parainfluenza Virus 3 Not Detected     Parainfluenza Virus 4 Not Detected     Bordetella pertussis pcr Not Detected     Influenza A H1 2009 PCR Not Detected     Chlamydophila pneumoniae PCR Not Detected     Mycoplasma pneumo by PCR Not Detected     Influenza A PCR Not Detected     Influenza A H3 Not Detected     Influenza A H1 Not Detected     RSV, PCR Not Detected     Bordetella parapertussis PCR Not Detected    Narrative:       The coronavirus on the RVP is NOT COVID-19 and is NOT indicative of infection with COVID-19.           Imaging Results (Last 24 Hours)     ** No results found for the last 24 hours. **          Results for orders placed during the hospital encounter of 09/29/19   Adult Transthoracic Echo Complete W/ Cont if Necessary Per Protocol    Narrative · Estimated EF = 60%.  · Left ventricular systolic function is normal.          I have reviewed the medications:  Scheduled Meds:    aspirin 81 mg Oral Daily   atorvastatin 80 mg Oral Nightly   budesonide-formoterol 2 puff  Inhalation BID - RT   calcitriol 0.25 mcg Oral Daily   carvedilol 6.25 mg Oral BID With Meals   docusate sodium 100 mg Oral BID   Hydrocortisone (Perianal)  Rectal BID   insulin lispro 0-7 Units Subcutaneous TID AC   isosorbide mononitrate 30 mg Oral Daily   nystatin  Topical Q12H   pantoprazole 40 mg Oral Q AM   saccharomyces boulardii 250 mg Oral Daily   sertraline 50 mg Oral Daily   sevelamer 800 mg Oral TID With Meals   sodium chloride 10 mL Intravenous Q12H   warfarin (COUMADIN) (dosing per levels)  Does not apply Daily     Continuous Infusions:    Pharmacy Consult      PRN Meds:.•  acetaminophen  •  dextrose  •  dextrose  •  glucagon (human recombinant)  •  heparin (porcine)  •  HYDROcodone-acetaminophen  •  ondansetron  •  Pharmacy Consult  •  polyethylene glycol  •  sodium chloride    Assessment/Plan   Assessment & Plan     Active Hospital Problems    Diagnosis  POA   • **Pneumonia due to COVID-19 virus [U07.1, J12.89]  Yes   • Acute blood loss anemia [D62]  Yes   • Pneumonia [J18.9]  Yes   • Hemorrhoids [K64.9]  Yes   • Supratherapeutic INR [R79.1]  Yes   • Volume overload [E87.70]  Yes   • Anticoagulated on Coumadin [Z79.01]  Not Applicable   • History of deep vein thrombosis (DVT) of brachial vein of left upper extremity (CMS/HCC) [I82.622]  Yes   • Chronic diastolic heart failure (CMS/HCC) [I50.32]  Yes   • ESRD (end stage renal disease) (CMS/[facility]) [N18.6]  Yes   • Acute respiratory failure with hypoxia (CMS/[facility]) [J96.01]  Yes   • Essential hypertension [I10]  Yes   • Morbid obesity (CMS/HCC) [E66.01]  Yes      Resolved Hospital Problems    Diagnosis Date Resolved POA   • Epistaxis [R04.0] 07/01/2020 Yes        Brief Hospital Course to date:  Joy Bueno is a 70 y.o. female who is a nursing home resident of [facility] with past medical history is significant for severe morbid obesity, end-stage renal disease on hemodialysis Monday Wednesday Friday, coronary artery disease, diabetes,  chronic anticoagulation for history of DVT, stroke, obstructive sleep apnea, and chronic hemorrhoids and anal fissure who presents to [facility] with complaint of hemoptysis and headache.  She was found to have acute respiratory failure with hypoxia, volume overload secondary to multiple missed dialysis sessions, and was positive for COVID-19.    Discussion/plan: Case discussed with infectious disease yesterday.  Plan discussed with infectious disease, case management, pharmacist, and nursing on multidisciplinary rounds today.  We are monitoring to see if patient warrants dexamethasone and investigational plasma therapy.  Clinically she seems much improved this morning after lethargy yesterday afternoon.  This is likely post dialysis and due to lack of sleep and being tired from her acute illness.  Continue to monitor with telemetry and continuous pulse oximetry.  I have checked several ABGs which note hypercapnia.  I have ordered intermittent BiPAP to help with hypercapnia particular with patient is sleeping or napping.  Or if she has symptoms with shortness of breath.  Plan to follow-up with laboratory studies.  Have discussed warfarin dosing with pharmacist today and we will follow-up INR and make adjustments.  There is a chance we will restart warfarin today depending on level.  If further lethargy happens today we will consider repeat ABG.  Continue to monitor for further bleeding.  Plan to follow-up on pending hemoglobin level.  Repeat laboratory studies tomorrow.  Very complex case.  Significant rest due to comorbid conditions.       Acute respiratory failure with hypoxia and hypercapnia  COVID-19 positive with pneumonia  Volume overload secondary to missed dialysis  Obstructive sleep apnea  Possible obesity hypoventilation syndrome  CT chest notable for findings consistent with pneumonia and volume overload.  Consulted infectious disease to assist with COVID management  Probiotic  Discontinued  antibiotics  BiPAP for hypercapnia intermittently and with sleep.    Epistaxis causing hemoptysis with acute blood loss anemia  Supratherapeutic INR  Bleeding stopped, suspect epistaxis causing hemoptysis with patient coughing up blood.  Significant blood loss noted with epistaxis.  No GI bleed noted.  Rhino Rocket has been removed.  Monitor for further episodes and monitor anemia.  Transfuse if needed.    Fecal occult positives, hemorrhoids, anemia of chronic kidney disease and chronic disease:  Hemoglobin appears to be stabilizing.  Transfuse as needed.  No further bleeding noted.  Continue to monitor.    History of DVT on chronic coagulation with warfarin:  Dosing per pharmacy.  Hold initially due to supratherapeutic INR.    Essential hypertension:  Carvedilol dose decreased.  Hold as needed.    Hyperlipidemia: Stable.  Statin.    Diabetes: Monitor glucose.  Correction insulin.    End-stage renal disease on hemodialysis: Nephrology following for dialysis.    DVT Prophylaxis: Anticoagulation    Discharge planning: Pending improvement.    CODE STATUS:   Code Status and Medical Interventions:   Ordered at: 06/29/20 0804     Level Of Support Discussed With:    Patient     Code Status:    CPR     Medical Interventions (Level of Support Prior to Arrest):    Full         Electronically signed by Trevon Briseno MD, 07/02/20, 07:31.

## 2020-07-02 NOTE — PROGRESS NOTES
"   LOS: 3 days    Patient Care Team:  Goldy Dior MD as PCP - General (Internal Medicine)    Chief Complaint: Shortness of breath COVID-19 positive  Consulted for ESRD.  70-year-old female with ESRD on hemodialysis Monday Wednesday Friday, CAD, diabetes, history of DVT on Coumadin, history of CVA cerebellar, morbid obesity, hemorrhoids with anal fissures presented with more places and headache.  She has missed her dialysis earlier.  She was recently diagnosed with COVID-19.  ET scan has shown pulmonary edema.  Subjective   No new events.       Review of Systems:   Patient seen from the outside the last.  No new complaints    Objective     Vital Sign Min/Max for last 24 hours  Temp  Min: 97.2 °F (36.2 °C)  Max: 98 °F (36.7 °C)   BP  Min: 91/36  Max: 128/70   Pulse  Min: 64  Max: 78   Resp  Min: 16  Max: 20   SpO2  Min: 90 %  Max: 100 %   No data recorded   Weight  Min: 143 kg (314 lb 3.2 oz)  Max: 143 kg (314 lb 3.2 oz)     Flowsheet Rows      First Filed Value   Admission Height  162.6 cm (64\") Documented at 06/29/2020 0341   Admission Weight  (!) 147 kg (324 lb) Documented at 06/29/2020 0341          No intake/output data recorded.  I/O last 3 completed shifts:  In: 300 [IV Piggyback:300]  Out: 3101 [Urine:200; Other:2900; Stool:1]    Physical Exam:  Due to COVID-19: Patient's examinations received from the staff RN and nursing assistant.  Patient is hypotensive although asymptomatic.  General Appearance: , no obvious distress.   Neck: Supple     .  Lungs:Equal chest movement, nonlabored.  Heart: Regular  Abdomen: Obesity  Extremities: Positive edema per RN  Neuro: Per RN      WBC WBC   Date Value Ref Range Status   07/02/2020 6.46 3.40 - 10.80 10*3/mm3 Final   06/30/2020 6.72 3.40 - 10.80 10*3/mm3 Final      HGB Hemoglobin   Date Value Ref Range Status   07/02/2020 7.9 (L) 12.0 - 15.9 g/dL Final   07/01/2020 7.7 (L) 12.0 - 15.9 g/dL Final   07/01/2020 7.9 (L) 12.0 - 15.9 g/dL Final   06/30/2020 7.7 (L) " 12.0 - 15.9 g/dL Final   06/30/2020 7.8 (L) 12.0 - 15.9 g/dL Final   06/30/2020 7.8 (L) 12.0 - 15.9 g/dL Final   06/30/2020 8.1 (L) 12.0 - 15.9 g/dL Final   06/29/2020 7.9 (L) 12.0 - 15.9 g/dL Final      HCT Hematocrit   Date Value Ref Range Status   07/02/2020 29.6 (L) 34.0 - 46.6 % Final   07/01/2020 26.6 (L) 34.0 - 46.6 % Final   07/01/2020 25.8 (L) 34.0 - 46.6 % Final   06/30/2020 25.5 (L) 34.0 - 46.6 % Final   06/30/2020 26.5 (L) 34.0 - 46.6 % Final   06/30/2020 26.5 (L) 34.0 - 46.6 % Final   06/30/2020 29.6 (L) 34.0 - 46.6 % Final   06/29/2020 26.6 (L) 34.0 - 46.6 % Final      Platlets No results found for: LABPLAT   MCV MCV   Date Value Ref Range Status   07/02/2020 110.0 (H) 79.0 - 97.0 fL Final   06/30/2020 100.0 (H) 79.0 - 97.0 fL Final          Sodium Sodium   Date Value Ref Range Status   07/01/2020 140 136 - 145 mmol/L Final   06/30/2020 140 136 - 145 mmol/L Final      Potassium Potassium   Date Value Ref Range Status   07/01/2020 3.7 3.5 - 5.2 mmol/L Final   06/30/2020 3.9 3.5 - 5.2 mmol/L Final      Chloride Chloride   Date Value Ref Range Status   07/01/2020 101 98 - 107 mmol/L Final   06/30/2020 98 98 - 107 mmol/L Final      CO2 CO2   Date Value Ref Range Status   07/01/2020 29.0 22.0 - 29.0 mmol/L Final   06/30/2020 27.0 22.0 - 29.0 mmol/L Final      BUN BUN   Date Value Ref Range Status   07/01/2020 25 (H) 8 - 23 mg/dL Final   06/30/2020 52 (H) 8 - 23 mg/dL Final      Creatinine Creatinine   Date Value Ref Range Status   07/01/2020 2.85 (H) 0.57 - 1.00 mg/dL Final   06/30/2020 4.81 (H) 0.57 - 1.00 mg/dL Final      Calcium Calcium   Date Value Ref Range Status   07/01/2020 8.7 8.6 - 10.5 mg/dL Final   06/30/2020 7.8 (L) 8.6 - 10.5 mg/dL Final      PO4 No results found for: CAPO4   Albumin No results found for: ALBUMIN   Magnesium No results found for: MG   Uric Acid No results found for: URICACID        Results Review:     I reviewed the patient's new clinical results.      aspirin 81 mg Oral Daily    atorvastatin 80 mg Oral Nightly   budesonide-formoterol 2 puff Inhalation BID - RT   calcitriol 0.25 mcg Oral Daily   carvedilol 6.25 mg Oral BID With Meals   docusate sodium 100 mg Oral BID   Hydrocortisone (Perianal)  Rectal BID   insulin lispro 0-7 Units Subcutaneous TID AC   isosorbide mononitrate 30 mg Oral Daily   nystatin  Topical Q12H   pantoprazole 40 mg Oral Q AM   saccharomyces boulardii 250 mg Oral Daily   sertraline 50 mg Oral Daily   sevelamer 800 mg Oral TID With Meals   sodium chloride 10 mL Intravenous Q12H   warfarin (COUMADIN) (dosing per levels)  Does not apply Daily       Pharmacy Consult        Medication Review: As above    Assessment/Plan   1.  ESRD dialysis Monday Wednesday Friday.  2.  Hypotension: Patient has received Coreg earlier today.  3.  BMD: On phosphate binders  4.  Hypoxia: Monitor closely.  5.  Anemia of chronic kidney disease.  6.  Acute respiratory failure with hypoxia COVID-19 positive.  7.  Epistaxis with supratherapeutic INR.  Plan:  Hypotensive will decrease the carvedilol 3.125 mg twice daily  HD per MWF vale.   Transfuse at hb<7. Avoid KIRK in the setting of COVID-19 infection.   High risk patient with multiple medical problems    Irineo Latham MD  07/02/20  10:50

## 2020-07-02 NOTE — DISCHARGE PLACEMENT REQUEST
"Joy Bueno (70 y.o. Female)     Date of Birth Social Security Number Address Home Phone MRN    1950  [address] [facility]  [address]  [place] [address] [phone] [id number]    [employer] Marital Status          None        Admission Date Admission Type Admitting Provider Attending Provider Department, Room/Bed    6/29/20 Emergency Trevon Briseno MD Furlow, Stephen Matthew, MD [facility] 6A, N615/1    Discharge Date Discharge Disposition Discharge Destination                       Attending Provider:  Trevon Briseno MD    Allergies:  Geodon [Ziprasidone Hcl], Haldol [Haloperidol Lactate], Seroquel [Quetiapine Fumarate]    Isolation:  Contact Air   Infection:  COVID (confirmed) (06/29/20)   Code Status:  CPR    Ht:  162.6 cm (64\")   Wt:  143 kg (314 lb 3.2 oz)    Admission Cmt:  None   Principal Problem:  Pneumonia due to COVID-19 virus [U07.1,J12.89]                 Active Insurance as of 6/29/2020     Primary Coverage     Payor Plan Insurance Group Employer/Plan Group    MEDICARE MEDICARE A & B      Payor Plan Address Payor Plan Phone Number Payor Plan Fax Number Effective Dates    [address] [phone]  5/1/2016 - None Entered    [facility] [id number]       Subscriber Name Subscriber Birth Date Member ID       JOY BUENO 1950 [id number]           Secondary Coverage     Payor Plan Insurance Group Employer/Plan Group    [place]      Payor Plan Address Payor Plan Phone Number Payor Plan Fax Number Effective Dates    [address] [phone]  8/2/2018 - None Entered    [facility] [address]       Subscriber Name Subscriber Birth Date Member ID       JOY BUENO 1950 [id number]                 Emergency Contacts      (Rel.) Home Phone Work Phone Mobile Phone    Tessie Dorado (Daughter) [phone] -- [phone]            Insurance Information                MEDICARE/MEDICARE A & B Phone: " [phone]    Subscriber: Myles Joy Subscriber#: [id number]    Group#:  Precert#:         [place] Phone: [phone]    Subscriber: Joy Bueno Subscriber#: [id number]    Group#:  Precert#:             Facility-Administered Medications as of 2020   Medication Dose Route Frequency Provider Last Rate Last Dose   • acetaminophen (TYLENOL) tablet 650 mg  650 mg Oral Q6H PRN Nanda Gonzalez DO   650 mg at 20 1551   • [COMPLETED] albumin human 25 % IV SOLN 25 g  25 g Intravenous Once Irineo Latham MD   25 g at 20 1126   • [] albumin human 25 % IV SOLN 25 g  25 g Intravenous PRN Bhaskar Trevino MD   25 g at 20 0815   • [COMPLETED] albumin human 25 % IV SOLN 50 g  50 g Intravenous PRN Bhaskar Trevino MD   50 g at 20 1618   • aspirin chewable tablet 81 mg  81 mg Oral Daily Nanda Gonzalez DO   81 mg at 20 0841   • atorvastatin (LIPITOR) tablet 80 mg  80 mg Oral Nightly Nanda Gonzalez DO   80 mg at 20   • budesonide-formoterol (SYMBICORT) 160-4.5 MCG/ACT inhaler 2 puff  2 puff Inhalation BID - RT Nanda Gonzalez DO   2 puff at 20 0911   • calcitriol (ROCALTROL) capsule 0.25 mcg  0.25 mcg Oral Daily Nanda Gonzalez DO   0.25 mcg at 20 1217   • carvedilol (COREG) tablet 3.125 mg  3.125 mg Oral BID With Meals Irineo Latham MD       • dextrose (D50W) 25 g/ 50mL Intravenous Solution 25 g  25 g Intravenous Q15 Min PRN Nanda Gonzalez R, DO       • dextrose (GLUTOSE) oral gel 15 g  15 g Oral Q15 Min PRN Nanda Gonzalez R, DO       • docusate sodium (COLACE) capsule 100 mg  100 mg Oral BID Nanda Gonzalez DO   100 mg at 20 2015   • glucagon (human recombinant) (GLUCAGEN DIAGNOSTIC) injection 1 mg  1 mg Subcutaneous Q15 Min PRN Nanda Gonzalez DO       • heparin (porcine) injection 1,800 Units  1,800 Units Intracatheter PRN Bhaskar Trevino MD   1,800 Units at 20 1123   •  HYDROcodone-acetaminophen (NORCO) 5-325 MG per tablet 1 tablet  1 tablet Oral Q4H PRN Nanda Gonzalez DO   1 tablet at 06/29/20 1745   • Hydrocortisone (Perianal) (ANUSOL-HC) 2.5 % rectal cream   Rectal BID Nanda Gonzalez R, DO       • insulin lispro (humaLOG) injection 0-7 Units  0-7 Units Subcutaneous TID AC Nanda Gonzalez DO   2 Units at 07/02/20 1218   • isosorbide mononitrate (IMDUR) 24 hr tablet 30 mg  30 mg Oral Daily Nanda Gonzalez DO   30 mg at 06/30/20 0844   • nystatin (MYCOSTATIN) powder   Topical Q12H Nanda Gonzalez DO   1 application at 07/02/20 0842   • ondansetron (ZOFRAN) injection 4 mg  4 mg Intravenous Q6H PRN Nanda Gonzalez DO   4 mg at 07/01/20 0446   • pantoprazole (PROTONIX) EC tablet 40 mg  40 mg Oral Q AM Nanda Gonzalez DO   40 mg at 07/02/20 0841   • Pharmacy to Dose - Warfarin   Does not apply Continuous PRN Nanda Gonzalez DO       • [COMPLETED] piperacillin-tazobactam (ZOSYN) 4.5 g in iso-osmotic dextrose 100 mL IVPB (premix)  4.5 g Intravenous Once Yanez, Rocky H IV, RPH   4.5 g at 06/29/20 0651   • polyethylene glycol (MIRALAX) packet 17 g  17 g Oral BID PRN Nanda Gonzalez DO       • saccharomyces boulardii (FLORASTOR) capsule 250 mg  250 mg Oral Daily Nanda Gonzalez DO   250 mg at 07/02/20 0841   • sertraline (ZOLOFT) tablet 50 mg  50 mg Oral Daily Nanda Gonzalez DO   50 mg at 07/02/20 0841   • sevelamer (RENVELA) packet 0.8 g  800 mg Oral TID With Meals Nanda Gonzalez DO   0.8 g at 07/02/20 1217   • sodium chloride 0.9 % flush 10 mL  10 mL Intravenous Q12H Nanda Gonzalez DO   10 mL at 07/01/20 1115   • sodium chloride 0.9 % flush 10 mL  10 mL Intravenous PRN Nanda Gonzalez DO       • [COMPLETED] vancomycin 3000 mg/500 mL 0.9% NS IVPB ([facility])  20 mg/kg Intravenous Once Kirt Ley DO   3,000 mg at 06/29/20 0651   • warfarin (COUMADIN) (dosing per levels)   Does not apply Daily Andrez Orantes, [facility]           Lab Results  (last 24 hours)     Procedure Component Value Units Date/Time    POC Glucose Once [[id number]]  (Abnormal) Collected:  07/02/20 1200    Specimen:  Blood Updated:  07/02/20 1210     Glucose 174 mg/dL     Protime-INR [[id number]]  (Abnormal) Collected:  07/02/20 1032    Specimen:  Blood Updated:  07/02/20 1136     Protime 35.7 Seconds      INR 3.60    Basic Metabolic Panel [[id number]]  (Abnormal) Collected:  07/02/20 1032    Specimen:  Blood Updated:  07/02/20 1133     Glucose 164 mg/dL      BUN 33 mg/dL      Creatinine 4.34 mg/dL      Sodium 144 mmol/L      Potassium 4.1 mmol/L      Comment: Specimen hemolyzed.  Results may be affected.        Chloride 103 mmol/L      CO2 29.0 mmol/L      Calcium 9.0 mg/dL      eGFR  African Amer 12 mL/min/1.73      Comment: <15 Indicative of kidney failure.        eGFR Non  Amer --     Comment: <15 Indicative of kidney failure.        BUN/Creatinine Ratio 7.6     Anion Gap 12.0 mmol/L     Narrative:       GFR Normal >60  Chronic Kidney Disease <60  Kidney Failure <15      Blood Culture - Blood, Arm, Left [[id number]] Collected:  06/29/20 0650    Specimen:  Blood from Arm, Left Updated:  07/02/20 0815     Blood Culture No growth at 3 days    CBC (No Diff) [[id number]]  (Abnormal) Collected:  07/02/20 0522    Specimen:  Blood Updated:  07/02/20 0803     WBC 6.46 10*3/mm3      RBC 2.69 10*6/mm3      Hemoglobin 7.9 g/dL      Hematocrit 29.6 %      .0 fL      MCH 29.4 pg      MCHC 26.7 g/dL      RDW 16.5 %      RDW-SD 67.0 fl      MPV 10.0 fL      Platelets 191 10*3/mm3     POC Glucose Once [[id number]]  (Normal) Collected:  07/02/20 0734    Specimen:  Blood Updated:  07/02/20 0743     Glucose 96 mg/dL     POC Glucose Once [[id number]]  (Normal) Collected:  07/01/20 1651    Specimen:  Blood Updated:  07/01/20 1655     Glucose 104 mg/dL     Basic Metabolic Panel [[id number]]  (Abnormal) Collected:  07/01/20 1406    Specimen:  Blood Updated:  07/01/20 1453     Glucose 131 mg/dL       BUN 25 mg/dL      Creatinine 2.85 mg/dL      Sodium 140 mmol/L      Potassium 3.7 mmol/L      Chloride 101 mmol/L      CO2 29.0 mmol/L      Calcium 8.7 mg/dL      eGFR  African Amer 20 mL/min/1.73      BUN/Creatinine Ratio 8.8     Anion Gap 10.0 mmol/L     Narrative:       GFR Normal >60  Chronic Kidney Disease <60  Kidney Failure <15      C-reactive Protein [[id number]]  (Abnormal) Collected:  07/01/20 1406    Specimen:  Blood Updated:  07/01/20 1447     C-Reactive Protein 0.86 mg/dL     Protime-INR [[id number]]  (Abnormal) Collected:  07/01/20 1406    Specimen:  Blood Updated:  07/01/20 1442     Protime 36.1 Seconds      INR 3.65    Hemoglobin & Hematocrit, Blood [[id number]]  (Abnormal) Collected:  07/01/20 1406    Specimen:  Blood Updated:  07/01/20 1429     Hemoglobin 7.7 g/dL      Hematocrit 26.6 %         Orders (last 24 hrs)      Start     Ordered    07/03/20 0600  Basic Metabolic Panel  Morning Draw      07/02/20 0731    07/03/20 0000  Hemodialysis Inpatient  Once     Comments:  Call me if more fluid needs to be taken off    07/02/20 1052    07/02/20 1800  carvedilol (COREG) tablet 3.125 mg  2 Times Daily With Meals      07/02/20 1050    07/02/20 1223  DIET MESSAGE Please send some grapes if allowed thank you  Once     Comments:  Please send some grapes if allowed thank you    07/02/20 1223    07/02/20 1211  POC Glucose Once  Once      07/02/20 1200    07/02/20 0744  POC Glucose Once  Once      07/02/20 0734    07/02/20 0600  CBC (No Diff)  Morning Draw      07/01/20 1400    07/02/20 0600  Basic Metabolic Panel  Morning Draw      07/01/20 1400    07/01/20 1723  Blood Gas, Arterial  STAT      07/01/20 1723    07/01/20 1655  POC Glucose Once  Once      07/01/20 1651    07/01/20 1424  Basic Metabolic Panel  Morning Draw      06/30/20 1132    07/01/20 0742  albumin human 25 % IV SOLN 25 g  As Needed      07/01/20 0742    07/01/20 0600  Protime-INR  Daily      06/30/20 0912    06/30/20 1800  carvedilol (COREG)  tablet 6.25 mg  2 Times Daily With Meals,   Status:  Discontinued      06/30/20 1108    06/30/20 0900  nystatin (MYCOSTATIN) powder  Every 12 Hours Scheduled      06/30/20 0752    06/29/20 2100  atorvastatin (LIPITOR) tablet 80 mg  Nightly      06/29/20 0804    06/29/20 1800  piperacillin-tazobactam (ZOSYN) 3.375 g in iso-osmotic dextrose 50 ml (premix)  Every 12 Hours,   Status:  Discontinued      06/29/20 0811    06/29/20 1800  warfarin (COUMADIN) (dosing per levels)  Daily Warfarin      06/29/20 0852    06/29/20 1700  HYDROcodone-acetaminophen (NORCO) 5-325 MG per tablet 1 tablet  Every 4 Hours PRN      06/29/20 1658    06/29/20 1454  heparin (porcine) injection 1,800 Units  As Needed      06/29/20 1455    06/29/20 1130  Hydrocortisone (Perianal) (ANUSOL-HC) 2.5 % rectal cream  2 Times Daily      06/29/20 1022    06/29/20 1100  POC Glucose 4x Daily AC & at Bedtime  4 Times Daily Before Meals & at Bedtime      06/29/20 0804    06/29/20 0930  budesonide-formoterol (SYMBICORT) 160-4.5 MCG/ACT inhaler 2 puff  2 Times Daily - RT      06/29/20 0804    06/29/20 0900  aspirin chewable tablet 81 mg  Daily      06/29/20 0804    06/29/20 0900  calcitriol (ROCALTROL) capsule 0.25 mcg  Daily      06/29/20 0804    06/29/20 0900  docusate sodium (COLACE) capsule 100 mg  2 Times Daily      06/29/20 0804    06/29/20 0900  isosorbide mononitrate (IMDUR) 24 hr tablet 30 mg  Daily      06/29/20 0804    06/29/20 0900  pantoprazole (PROTONIX) EC tablet 40 mg  Every Early Morning      06/29/20 0804    06/29/20 0900  saccharomyces boulardii (FLORASTOR) capsule 250 mg  Daily      06/29/20 0804    06/29/20 0900  sertraline (ZOLOFT) tablet 50 mg  Daily      06/29/20 0804 06/29/20 0900  sevelamer (RENVELA) packet 0.8 g  3 Times Daily With Meals      06/29/20 0804 06/29/20 0900  sodium chloride 0.9 % flush 10 mL  Every 12 Hours Scheduled      06/29/20 0804 06/29/20 0900  insulin lispro (humaLOG) injection 0-7 Units  3 Times Daily  Before Meals      06/29/20 0804    06/29/20 0900  doxycycline (MONODOX) capsule 100 mg  Every 12 Hours Scheduled,   Status:  Discontinued      06/29/20 0820    06/29/20 0805  Pharmacy to Dose - Warfarin  Continuous PRN      06/29/20 0805    06/29/20 0804  ondansetron (ZOFRAN) injection 4 mg  Every 6 Hours PRN      06/29/20 0804    06/29/20 0804  dextrose (GLUTOSE) oral gel 15 g  Every 15 Minutes PRN      06/29/20 0804    06/29/20 0804  dextrose (D50W) 25 g/ 50mL Intravenous Solution 25 g  Every 15 Minutes PRN      06/29/20 0804    06/29/20 0804  glucagon (human recombinant) (GLUCAGEN DIAGNOSTIC) injection 1 mg  Every 15 Minutes PRN      06/29/20 0804    06/29/20 0804  sodium chloride 0.9 % flush 10 mL  As Needed      06/29/20 0804    06/29/20 0803  polyethylene glycol (MIRALAX) packet 17 g  2 Times Daily PRN      06/29/20 0804    06/29/20 0801  acetaminophen (TYLENOL) tablet 650 mg  Every 6 Hours PRN      06/29/20 0804    Signed and Held  albumin human 25 % IV SOLN 12.5 g  As Needed      Signed and Held                   Physician Progress Notes (last 24 hours) (Notes from 07/01/20 1402 through 07/02/20 1402)      Irineo Latham MD at 07/02/20 1050             LOS: 3 days    Patient Care Team:  Goldy Dior MD as PCP - General (Internal Medicine)    Chief Complaint: Shortness of breath COVID-19 positive  Consulted for ESRD.  70-year-old female with ESRD on hemodialysis Monday Wednesday Friday, CAD, diabetes, history of DVT on Coumadin, history of CVA cerebellar, morbid obesity, hemorrhoids with anal fissures presented with more places and headache.  She has missed her dialysis earlier.  She was recently diagnosed with COVID-19.  ET scan has shown pulmonary edema.  Subjective   No new events.       Review of Systems:   Patient seen from the outside the last.  No new complaints    Objective     Vital Sign Min/Max for last 24 hours  Temp  Min: 97.2 °F (36.2 °C)  Max: 98 °F (36.7 °C)   BP  Min: 91/36  Max:  "128/70   Pulse  Min: 64  Max: 78   Resp  Min: 16  Max: 20   SpO2  Min: 90 %  Max: 100 %   No data recorded   Weight  Min: 143 kg (314 lb 3.2 oz)  Max: 143 kg (314 lb 3.2 oz)     Flowsheet Rows      First Filed Value   Admission Height  162.6 cm (64\") Documented at 06/29/2020 0341   Admission Weight  (!) 147 kg (324 lb) Documented at 06/29/2020 0341          No intake/output data recorded.  I/O last 3 completed shifts:  In: 300 [IV Piggyback:300]  Out: 3101 [Urine:200; Other:2900; Stool:1]    Physical Exam:  Due to COVID-19: Patient's examinations received from the staff RN and nursing assistant.  Patient is hypotensive although asymptomatic.  General Appearance: , no obvious distress.   Neck: Supple     .  Lungs:Equal chest movement, nonlabored.  Heart: Regular  Abdomen: Obesity  Extremities: Positive edema per RN  Neuro: Per RN      WBC WBC   Date Value Ref Range Status   07/02/2020 6.46 3.40 - 10.80 10*3/mm3 Final   06/30/2020 6.72 3.40 - 10.80 10*3/mm3 Final      HGB Hemoglobin   Date Value Ref Range Status   07/02/2020 7.9 (L) 12.0 - 15.9 g/dL Final   07/01/2020 7.7 (L) 12.0 - 15.9 g/dL Final   07/01/2020 7.9 (L) 12.0 - 15.9 g/dL Final   06/30/2020 7.7 (L) 12.0 - 15.9 g/dL Final   06/30/2020 7.8 (L) 12.0 - 15.9 g/dL Final   06/30/2020 7.8 (L) 12.0 - 15.9 g/dL Final   06/30/2020 8.1 (L) 12.0 - 15.9 g/dL Final   06/29/2020 7.9 (L) 12.0 - 15.9 g/dL Final      HCT Hematocrit   Date Value Ref Range Status   07/02/2020 29.6 (L) 34.0 - 46.6 % Final   07/01/2020 26.6 (L) 34.0 - 46.6 % Final   07/01/2020 25.8 (L) 34.0 - 46.6 % Final   06/30/2020 25.5 (L) 34.0 - 46.6 % Final   06/30/2020 26.5 (L) 34.0 - 46.6 % Final   06/30/2020 26.5 (L) 34.0 - 46.6 % Final   06/30/2020 29.6 (L) 34.0 - 46.6 % Final   06/29/2020 26.6 (L) 34.0 - 46.6 % Final      Platlets No results found for: LABPLAT   MCV MCV   Date Value Ref Range Status   07/02/2020 110.0 (H) 79.0 - 97.0 fL Final   06/30/2020 100.0 (H) 79.0 - 97.0 fL Final    "       Sodium Sodium   Date Value Ref Range Status   07/01/2020 140 136 - 145 mmol/L Final   06/30/2020 140 136 - 145 mmol/L Final      Potassium Potassium   Date Value Ref Range Status   07/01/2020 3.7 3.5 - 5.2 mmol/L Final   06/30/2020 3.9 3.5 - 5.2 mmol/L Final      Chloride Chloride   Date Value Ref Range Status   07/01/2020 101 98 - 107 mmol/L Final   06/30/2020 98 98 - 107 mmol/L Final      CO2 CO2   Date Value Ref Range Status   07/01/2020 29.0 22.0 - 29.0 mmol/L Final   06/30/2020 27.0 22.0 - 29.0 mmol/L Final      BUN BUN   Date Value Ref Range Status   07/01/2020 25 (H) 8 - 23 mg/dL Final   06/30/2020 52 (H) 8 - 23 mg/dL Final      Creatinine Creatinine   Date Value Ref Range Status   07/01/2020 2.85 (H) 0.57 - 1.00 mg/dL Final   06/30/2020 4.81 (H) 0.57 - 1.00 mg/dL Final      Calcium Calcium   Date Value Ref Range Status   07/01/2020 8.7 8.6 - 10.5 mg/dL Final   06/30/2020 7.8 (L) 8.6 - 10.5 mg/dL Final      PO4 No results found for: CAPO4   Albumin No results found for: ALBUMIN   Magnesium No results found for: MG   Uric Acid No results found for: URICACID        Results Review:     I reviewed the patient's new clinical results.      aspirin 81 mg Oral Daily   atorvastatin 80 mg Oral Nightly   budesonide-formoterol 2 puff Inhalation BID - RT   calcitriol 0.25 mcg Oral Daily   carvedilol 6.25 mg Oral BID With Meals   docusate sodium 100 mg Oral BID   Hydrocortisone (Perianal)  Rectal BID   insulin lispro 0-7 Units Subcutaneous TID AC   isosorbide mononitrate 30 mg Oral Daily   nystatin  Topical Q12H   pantoprazole 40 mg Oral Q AM   saccharomyces boulardii 250 mg Oral Daily   sertraline 50 mg Oral Daily   sevelamer 800 mg Oral TID With Meals   sodium chloride 10 mL Intravenous Q12H   warfarin (COUMADIN) (dosing per levels)  Does not apply Daily       Pharmacy Consult        Medication Review: As above    Assessment/Plan   1.  ESRD dialysis Monday Wednesday Friday.  2.  Hypotension: Patient has received  Coreg earlier today.  3.  BMD: On phosphate binders  4.  Hypoxia: Monitor closely.  5.  Anemia of chronic kidney disease.  6.  Acute respiratory failure with hypoxia COVID-19 positive.  7.  Epistaxis with supratherapeutic INR.  Plan:  Hypotensive will decrease the carvedilol 3.125 mg twice daily  HD per MWF vale.   Transfuse at hb<7. Avoid KIRK in the setting of COVID-19 infection.   High risk patient with multiple medical problems    Irineo Latham MD  20  10:50      Electronically signed by Irineo Latham MD at 20 1119     Trevon Briseno MD at 20 0799              [facility] Medicine Services  PROGRESS NOTE    Patient Name: Joy Bueno  : 1950  MRN: [id number]    Date of Admission: 2020  Primary Care Physician: Goldy Dior MD    Subjective   Subjective     CC:  Follow-up shortness of breath and COVID-19    HPI:  Patient much more awake today.  Does note some shortness of breath but is currently on room air oxygen satting 95%.  Feels much more awake today.  Denies further nosebleeding.  Nursing notes she woke up much more in the night after significant rest but was lethargic yesterday afternoon.      Review of Systems  No current fevers or chills  No current dysuria or hematuria  No current nausea, vomiting, or diarrhea  No current chest pain or palpitations      Objective   Objective     Vital Signs:   Temp:  [97.2 °F (36.2 °C)-98.3 °F (36.8 °C)] 98 °F (36.7 °C)  Heart Rate:  [59-81] 64  Resp:  [16-20] 20  BP: ()/(41-70) 128/70        Physical Exam:  Constitutional:Awake, alert  HENT: NCAT, mucous membranes moist, neck supple  Respiratory: Minimal cough, no audible wheezes, currently on room air, rate relatively normal  Cardiovascular: RRR, normal radial pulses  Gastrointestinal: Positive bowel sounds, soft, nontender, nondistended  Musculoskeletal: Elderly and chronically debilitated in appearance, severe morbid obesity, some  lower extremity edema, BMI 53  Psychiatric: Appropriate affect, cooperative, conversational  Neurologic: No slurred speech or facial droop, follows commands, oriented to name and location  Skin: No rashes or jaundice, warm      Results Reviewed:  Results from last 7 days   Lab Units 07/01/20  1406 07/01/20  0254 06/30/20  1615 06/30/20  0930 06/30/20  0352  06/29/20  0530   WBC 10*3/mm3  --   --   --   --  6.72  --  7.15   HEMOGLOBIN g/dL 7.7* 7.9* 7.7*  --  7.8*  7.8*   < > 10.8*   HEMATOCRIT % 26.6* 25.8* 25.5*  --  26.5*  26.5*   < > 36.4   PLATELETS 10*3/mm3  --   --   --   --  217  --  251   INR  3.65*  --   --  4.64*  --   --  3.97*   PROCALCITONIN ng/mL  --   --   --   --   --   --  0.32*  0.34*    < > = values in this interval not displayed.     Results from last 7 days   Lab Units 07/01/20  1406 06/30/20  0352 06/30/20  0048 06/29/20  1711 06/29/20  1137 06/29/20  0530   SODIUM mmol/L 140 140  --   --   --  140   POTASSIUM mmol/L 3.7 3.9  --   --   --  4.3   CHLORIDE mmol/L 101 98  --   --   --  98   CO2 mmol/L 29.0 27.0  --   --   --  27.0   BUN mg/dL 25* 52*  --   --   --  88*   CREATININE mg/dL 2.85* 4.81*  --   --   --  7.17*   GLUCOSE mg/dL 131* 185*  --   --   --  161*   CALCIUM mg/dL 8.7 7.8*  --   --   --  7.7*   ALT (SGPT) U/L  --   --   --   --   --  10   AST (SGOT) U/L  --   --   --   --   --  15   TROPONIN T ng/mL  --   --  0.108* 0.103* 0.108* 0.127*   PROBNP pg/mL  --   --   --   --   --  14,663.0*     Estimated Creatinine Clearance: 26.5 mL/min (A) (by C-G formula based on SCr of 2.85 mg/dL (H)).    Microbiology Results Abnormal     Procedure Component Value - Date/Time    Blood Culture - Blood, Arm, Left [[id number]] Collected:  06/29/20 0650    Lab Status:  Preliminary result Specimen:  Blood from Arm, Left Updated:  07/01/20 0815     Blood Culture No growth at 2 days    Blood Culture - Blood, Arm, Left [[id number]]  (Abnormal) Collected:  06/29/20 0630    Lab Status:  Final result  Specimen:  Blood from Arm, Left Updated:  07/01/20 0624     Blood Culture Staphylococcus, coagulase negative     Comment: Probable contaminant requires clinical correlation, susceptibility not performed unless requested by physician.          Isolated from Aerobic Bottle     Gram Stain Aerobic Bottle Gram positive cocci in pairs and clusters    Blood Culture ID, PCR - Blood, Arm, Left [[id number]]  (Abnormal) Collected:  06/29/20 0630    Lab Status:  Final result Specimen:  Blood from Arm, Left Updated:  06/30/20 0918     BCID, PCR Staphylococcus spp, not aureus. Identification by BCID PCR.    COVID-19,[facility] IN-HOUSE, NP SWAB IN TRANSPORT MEDIA 8-12 HR TAT - Swab, Nasopharynx [[id number]]  (Abnormal) Collected:  06/29/20 0814    Lab Status:  Final result Specimen:  Swab from Nasopharynx Updated:  06/29/20 1345     COVID19 Detected    Narrative:       Fact sheet for providers: https://www.fda.gov/media/375813/download     Fact sheet for patients: https://www.fda.gov/media/053798/download    Respiratory Panel, PCR - Swab, Nasopharynx [[id number]]  (Normal) Collected:  06/29/20 0814    Lab Status:  Final result Specimen:  Swab from Nasopharynx Updated:  06/29/20 1029     ADENOVIRUS, PCR Not Detected     Coronavirus 229E Not Detected     Coronavirus HKU1 Not Detected     Coronavirus NL63 Not Detected     Coronavirus OC43 Not Detected     Human Metapneumovirus Not Detected     Human Rhinovirus/Enterovirus Not Detected     Influenza B PCR Not Detected     Parainfluenza Virus 1 Not Detected     Parainfluenza Virus 2 Not Detected     Parainfluenza Virus 3 Not Detected     Parainfluenza Virus 4 Not Detected     Bordetella pertussis pcr Not Detected     Influenza A H1 2009 PCR Not Detected     Chlamydophila pneumoniae PCR Not Detected     Mycoplasma pneumo by PCR Not Detected     Influenza A PCR Not Detected     Influenza A H3 Not Detected     Influenza A H1 Not Detected     RSV, PCR Not Detected     Bordetella parapertussis  PCR Not Detected    Narrative:       The coronavirus on the RVP is NOT COVID-19 and is NOT indicative of infection with COVID-19.           Imaging Results (Last 24 Hours)     ** No results found for the last 24 hours. **          Results for orders placed during the hospital encounter of 09/29/19   Adult Transthoracic Echo Complete W/ Cont if Necessary Per Protocol    Narrative · Estimated EF = 60%.  · Left ventricular systolic function is normal.          I have reviewed the medications:  Scheduled Meds:    aspirin 81 mg Oral Daily   atorvastatin 80 mg Oral Nightly   budesonide-formoterol 2 puff Inhalation BID - RT   calcitriol 0.25 mcg Oral Daily   carvedilol 6.25 mg Oral BID With Meals   docusate sodium 100 mg Oral BID   Hydrocortisone (Perianal)  Rectal BID   insulin lispro 0-7 Units Subcutaneous TID AC   isosorbide mononitrate 30 mg Oral Daily   nystatin  Topical Q12H   pantoprazole 40 mg Oral Q AM   saccharomyces boulardii 250 mg Oral Daily   sertraline 50 mg Oral Daily   sevelamer 800 mg Oral TID With Meals   sodium chloride 10 mL Intravenous Q12H   warfarin (COUMADIN) (dosing per levels)  Does not apply Daily     Continuous Infusions:    Pharmacy Consult      PRN Meds:.•  acetaminophen  •  dextrose  •  dextrose  •  glucagon (human recombinant)  •  heparin (porcine)  •  HYDROcodone-acetaminophen  •  ondansetron  •  Pharmacy Consult  •  polyethylene glycol  •  sodium chloride    Assessment/Plan   Assessment & Plan     Active Hospital Problems    Diagnosis  POA   • **Pneumonia due to COVID-19 virus [U07.1, J12.89]  Yes   • Acute blood loss anemia [D62]  Yes   • Pneumonia [J18.9]  Yes   • Hemorrhoids [K64.9]  Yes   • Supratherapeutic INR [R79.1]  Yes   • Volume overload [E87.70]  Yes   • Anticoagulated on Coumadin [Z79.01]  Not Applicable   • History of deep vein thrombosis (DVT) of brachial vein of left upper extremity (CMS/HCC) [I82.622]  Yes   • Chronic diastolic heart failure (CMS/HCC) [I50.32]  Yes   •  ESRD (end stage renal disease) (CMS/[facility]) [N18.6]  Yes   • Acute respiratory failure with hypoxia (CMS/[facility]) [J96.01]  Yes   • Essential hypertension [I10]  Yes   • Morbid obesity (CMS/[facility]) [E66.01]  Yes      Resolved Hospital Problems    Diagnosis Date Resolved POA   • Epistaxis [R04.0] 07/01/2020 Yes        Brief Hospital Course to date:  Joy Bueno is a 70 y.o. female who is a nursing home resident of [facility] with past medical history is significant for severe morbid obesity, end-stage renal disease on hemodialysis Monday Wednesday Friday, coronary artery disease, diabetes, chronic anticoagulation for history of DVT, stroke, obstructive sleep apnea, and chronic hemorrhoids and anal fissure who presents to [facility] with complaint of hemoptysis and headache.  She was found to have acute respiratory failure with hypoxia, volume overload secondary to multiple missed dialysis sessions, and was positive for COVID-19.    Discussion/plan: Case discussed with infectious disease yesterday.  Plan discussed with infectious disease, case management, pharmacist, and nursing on multidisciplinary rounds today.  We are monitoring to see if patient warrants dexamethasone and investigational plasma therapy.  Clinically she seems much improved this morning after lethargy yesterday afternoon.  This is likely post dialysis and due to lack of sleep and being tired from her acute illness.  Continue to monitor with telemetry and continuous pulse oximetry.  I have checked several ABGs which note hypercapnia.  I have ordered intermittent BiPAP to help with hypercapnia particular with patient is sleeping or napping.  Or if she has symptoms with shortness of breath.  Plan to follow-up with laboratory studies.  Have discussed warfarin dosing with pharmacist today and we will follow-up INR and make adjustments.  There is a chance we will restart warfarin today depending on level.  If further lethargy happens today  we will consider repeat ABG.  Continue to monitor for further bleeding.  Plan to follow-up on pending hemoglobin level.  Repeat laboratory studies tomorrow.  Very complex case.  Significant rest due to comorbid conditions.       Acute respiratory failure with hypoxia and hypercapnia  COVID-19 positive with pneumonia  Volume overload secondary to missed dialysis  Obstructive sleep apnea  Possible obesity hypoventilation syndrome  CT chest notable for findings consistent with pneumonia and volume overload.  Consulted infectious disease to assist with COVID management  Probiotic  Discontinued antibiotics  BiPAP for hypercapnia intermittently and with sleep.    Epistaxis causing hemoptysis with acute blood loss anemia  Supratherapeutic INR  Bleeding stopped, suspect epistaxis causing hemoptysis with patient coughing up blood.  Significant blood loss noted with epistaxis.  No GI bleed noted.  Rhino Rocket has been removed.  Monitor for further episodes and monitor anemia.  Transfuse if needed.    Fecal occult positives, hemorrhoids, anemia of chronic kidney disease and chronic disease:  Hemoglobin appears to be stabilizing.  Transfuse as needed.  No further bleeding noted.  Continue to monitor.    History of DVT on chronic coagulation with warfarin:  Dosing per pharmacy.  Hold initially due to supratherapeutic INR.    Essential hypertension:  Carvedilol dose decreased.  Hold as needed.    Hyperlipidemia: Stable.  Statin.    Diabetes: Monitor glucose.  Correction insulin.    End-stage renal disease on hemodialysis: Nephrology following for dialysis.    DVT Prophylaxis: Anticoagulation    Discharge planning: Pending improvement.    CODE STATUS:   Code Status and Medical Interventions:   Ordered at: 06/29/20 0804     Level Of Support Discussed With:    Patient     Code Status:    CPR     Medical Interventions (Level of Support Prior to Arrest):    Full         Electronically signed by Trevon Briseno MD, 07/02/20,  07:31.        Electronically signed by Trevon Briseno MD at 07/02/20 0735       Physical Therapy Notes (last 24 hours) (Notes from 07/01/20 1402 through 07/02/20 1402)    No notes exist for this encounter.         Occupational Therapy Notes (last 24 hours) (Notes from 07/01/20 1402 through 07/02/20 1402)    No notes exist for this encounter.         Respiratory Therapy Notes (last 24 hours) (Notes from 07/01/20 1402 through 07/02/20 1402)    No notes exist for this encounter.

## 2020-07-02 NOTE — PROGRESS NOTES
"Pharmacy Consult  -  Warfarin    Joy Bueno is a  70 y.o. female   Height - 162.6 cm (64\")  Weight - (!) 143 kg (314 lb 3.2 oz)    Consulting Provider: - Nanda Gonzalez DO  Indication: - Hx of DVT  Goal INR: - 2-3  Home Regimen:   - warfarin 6mg daily    Bridge Therapy: No       Drug-Drug Interactions with current regimen:   Doxycycline - increased bleeding risk              Pantoprazole - may increase INR              Zosyn - may increase bleeding risk    Warfarin Dosing During Admission:    Date  6/29 6/30 7/1 7/2        INR  3.97 4.64 3.65 3.6        Dose  0 0 0 0             Education Provided:    Discharge Follow up:   Following Provider -  [facility]   Follow up time range or appointment - 2-3 days following discharge      Labs:    Results from last 7 days   Lab Units 07/02/20  1032 07/02/20  0522 07/01/20  1406 07/01/20  0254 06/30/20  1615 06/30/20  0930 06/30/20  0352 06/30/20  0048 06/29/20  1711 06/29/20  0530   INR  3.60*  --  3.65*  --   --  4.64*  --   --   --  3.97*   HEMOGLOBIN g/dL  --  7.9* 7.7* 7.9* 7.7*  --  7.8*  7.8* 8.1* 7.9* 10.8*   HEMATOCRIT %  --  29.6* 26.6* 25.8* 25.5*  --  26.5*  26.5* 29.6* 26.6* 36.4     Results from last 7 days   Lab Units 07/02/20  1032 07/01/20  1406 06/30/20  0352 06/29/20  0530   SODIUM mmol/L 144 140 140 140   POTASSIUM mmol/L 4.1 3.7 3.9 4.3   CHLORIDE mmol/L 103 101 98 98   CO2 mmol/L 29.0 29.0 27.0 27.0   BUN mg/dL 33* 25* 52* 88*   CREATININE mg/dL 4.34* 2.85* 4.81* 7.17*   CALCIUM mg/dL 9.0 8.7 7.8* 7.7*   BILIRUBIN mg/dL  --   --   --  0.5   ALK PHOS U/L  --   --   --  94   ALT (SGPT) U/L  --   --   --  10   AST (SGOT) U/L  --   --   --  15   GLUCOSE mg/dL 164* 131* 185* 161*       Current dietary intake: 0% of documented meals  Diet Order   Procedures    Diet Regular; Cardiac, Consistent Carbohydrate, Renal     Assessment/Plan:   Warfarin dosing for hx DVT  Goal INR: 2-3  7/2 INR - 3.6  7/2 H/H - 7.9/29.6    With supratherapeutic " INR, will continue to hold warfarin   Monitor s/s bleeding, dietary intake, clinical status and drug-drug interactions  Follow daily INR and adjust dose accordingly    Thanks  Andrez Orantes [facility]  7/2/2020  11:40

## 2020-07-02 NOTE — PROGRESS NOTES
Discharge Planning Assessment  [facility]     Patient Name: Joy Bueno  MRN: [id number]  Today's Date: 7/2/2020    Admit Date: 6/29/2020    Discharge Needs Assessment    No documentation.       Discharge Plan     Row Name 07/02/20 1404       Plan    Plan  update    Plan Comments  Recieved pc from Keshawn @[facility]. Wanted update on pt condition and notes faxed to [facility] [phone]. Notes faxed to Keshawn        Destination - Selection Complete      Service Provider Request Status Selected Services Address Phone Number Fax Number    [facility] [address] [id number] [phone]      Durable Medical Equipment      Coordination has not been started for this encounter.      Dialysis/Infusion      Service Provider Request Status Selected Services Address Phone Number Fax Number    [facility] Selected Dialysis [address] [id number] [phone]      Home Medical Care      Coordination has not been started for this encounter.      Therapy      Coordination has not been started for this encounter.      Community Resources      Coordination has not been started for this encounter.        Expected Discharge Date and Time     Expected Discharge Date Expected Discharge Time    Jul 3, 2020         Demographic Summary    No documentation.       Functional Status    No documentation.       Psychosocial    No documentation.       Abuse/Neglect    No documentation.       Legal    No documentation.       Substance Abuse    No documentation.       Patient Forms    No documentation.           Zuleima Johnson RN

## 2020-07-03 ENCOUNTER — APPOINTMENT (OUTPATIENT)
Dept: NEPHROLOGY | Facility: HOSPITAL | Age: 70
End: 2020-07-03

## 2020-07-03 LAB
ANION GAP SERPL CALCULATED.3IONS-SCNC: 14 MMOL/L (ref 5–15)
BACTERIA SPEC AEROBE CULT: ABNORMAL
BUN SERPL-MCNC: 23 MG/DL (ref 8–23)
BUN/CREAT SERPL: 7.5 (ref 7–25)
CALCIUM SPEC-SCNC: 8.6 MG/DL (ref 8.6–10.5)
CHLORIDE SERPL-SCNC: 96 MMOL/L (ref 98–107)
CO2 SERPL-SCNC: 26 MMOL/L (ref 22–29)
CREAT SERPL-MCNC: 3.06 MG/DL (ref 0.57–1)
GFR SERPL CREATININE-BSD FRML MDRD: 18 ML/MIN/1.73
GLUCOSE BLDC GLUCOMTR-MCNC: 150 MG/DL (ref 70–130)
GLUCOSE BLDC GLUCOMTR-MCNC: 179 MG/DL (ref 70–130)
GLUCOSE BLDC GLUCOMTR-MCNC: 214 MG/DL (ref 70–130)
GLUCOSE SERPL-MCNC: 152 MG/DL (ref 65–99)
GRAM STN SPEC: ABNORMAL
GRAM STN SPEC: ABNORMAL
INR PPP: 2.18 (ref 0.85–1.16)
ISOLATED FROM: ABNORMAL
POTASSIUM SERPL-SCNC: 3.5 MMOL/L (ref 3.5–5.2)
PROTHROMBIN TIME: 23.9 SECONDS (ref 11.5–14)
SODIUM SERPL-SCNC: 136 MMOL/L (ref 136–145)

## 2020-07-03 PROCEDURE — 99233 SBSQ HOSP IP/OBS HIGH 50: CPT | Performed by: INTERNAL MEDICINE

## 2020-07-03 PROCEDURE — 94799 UNLISTED PULMONARY SVC/PX: CPT

## 2020-07-03 PROCEDURE — 63710000001 INSULIN LISPRO (HUMAN) PER 5 UNITS: Performed by: INTERNAL MEDICINE

## 2020-07-03 PROCEDURE — 94660 CPAP INITIATION&MGMT: CPT

## 2020-07-03 PROCEDURE — 5A1D70Z PERFORMANCE OF URINARY FILTRATION, INTERMITTENT, LESS THAN 6 HOURS PER DAY: ICD-10-PCS | Performed by: INTERNAL MEDICINE

## 2020-07-03 PROCEDURE — 80048 BASIC METABOLIC PNL TOTAL CA: CPT | Performed by: INTERNAL MEDICINE

## 2020-07-03 PROCEDURE — 25010000002 HEPARIN (PORCINE) PER 1000 UNITS: Performed by: INTERNAL MEDICINE

## 2020-07-03 PROCEDURE — 25010000002 ONDANSETRON PER 1 MG: Performed by: INTERNAL MEDICINE

## 2020-07-03 PROCEDURE — 82962 GLUCOSE BLOOD TEST: CPT

## 2020-07-03 PROCEDURE — 85610 PROTHROMBIN TIME: CPT

## 2020-07-03 RX ORDER — WARFARIN SODIUM 5 MG/1
5 TABLET ORAL
Status: DISCONTINUED | OUTPATIENT
Start: 2020-07-03 | End: 2020-07-05

## 2020-07-03 RX ORDER — CARVEDILOL 12.5 MG/1
12.5 TABLET ORAL 2 TIMES DAILY WITH MEALS
Status: DISCONTINUED | OUTPATIENT
Start: 2020-07-03 | End: 2020-07-08 | Stop reason: HOSPADM

## 2020-07-03 RX ADMIN — CARVEDILOL 3.12 MG: 3.12 TABLET, FILM COATED ORAL at 08:16

## 2020-07-03 RX ADMIN — HYDROCODONE BITARTRATE AND ACETAMINOPHEN 1 TABLET: 5; 325 TABLET ORAL at 03:01

## 2020-07-03 RX ADMIN — SEVELAMER CARBONATE 0.8 G: 800 POWDER, FOR SUSPENSION ORAL at 08:16

## 2020-07-03 RX ADMIN — HYDROCODONE BITARTRATE AND ACETAMINOPHEN 1 TABLET: 5; 325 TABLET ORAL at 10:30

## 2020-07-03 RX ADMIN — PANTOPRAZOLE SODIUM 40 MG: 40 TABLET, DELAYED RELEASE ORAL at 06:48

## 2020-07-03 RX ADMIN — SERTRALINE 50 MG: 50 TABLET, FILM COATED ORAL at 08:16

## 2020-07-03 RX ADMIN — NYSTATIN: 100000 POWDER TOPICAL at 11:02

## 2020-07-03 RX ADMIN — BUDESONIDE AND FORMOTEROL FUMARATE DIHYDRATE 2 PUFF: 160; 4.5 AEROSOL RESPIRATORY (INHALATION) at 07:55

## 2020-07-03 RX ADMIN — INSULIN LISPRO 2 UNITS: 100 INJECTION, SOLUTION INTRAVENOUS; SUBCUTANEOUS at 12:43

## 2020-07-03 RX ADMIN — ASPIRIN 81 MG CHEWABLE TABLET 81 MG: 81 TABLET CHEWABLE at 08:16

## 2020-07-03 RX ADMIN — CARVEDILOL 12.5 MG: 12.5 TABLET, FILM COATED ORAL at 17:56

## 2020-07-03 RX ADMIN — NYSTATIN: 100000 POWDER TOPICAL at 20:20

## 2020-07-03 RX ADMIN — ISOSORBIDE MONONITRATE 30 MG: 30 TABLET, EXTENDED RELEASE ORAL at 08:16

## 2020-07-03 RX ADMIN — CARVEDILOL 9.38 MG: 6.25 TABLET, FILM COATED ORAL at 09:27

## 2020-07-03 RX ADMIN — HEPARIN SODIUM 1800 UNITS: 1000 INJECTION INTRAVENOUS; SUBCUTANEOUS at 08:12

## 2020-07-03 RX ADMIN — HYDROCORTISONE: 25 CREAM TOPICAL at 20:20

## 2020-07-03 RX ADMIN — HYDROCODONE BITARTRATE AND ACETAMINOPHEN 1 TABLET: 5; 325 TABLET ORAL at 23:04

## 2020-07-03 RX ADMIN — Medication 250 MG: at 08:16

## 2020-07-03 RX ADMIN — INSULIN LISPRO 3 UNITS: 100 INJECTION, SOLUTION INTRAVENOUS; SUBCUTANEOUS at 17:56

## 2020-07-03 RX ADMIN — SEVELAMER CARBONATE 0.8 G: 800 POWDER, FOR SUSPENSION ORAL at 12:43

## 2020-07-03 RX ADMIN — WARFARIN SODIUM 5 MG: 5 TABLET ORAL at 17:57

## 2020-07-03 RX ADMIN — HYDROCORTISONE: 25 CREAM TOPICAL at 11:02

## 2020-07-03 RX ADMIN — ONDANSETRON 4 MG: 2 INJECTION INTRAMUSCULAR; INTRAVENOUS at 18:04

## 2020-07-03 RX ADMIN — SODIUM CHLORIDE, PRESERVATIVE FREE 10 ML: 5 INJECTION INTRAVENOUS at 20:20

## 2020-07-03 RX ADMIN — DOCUSATE SODIUM 100 MG: 100 CAPSULE, LIQUID FILLED ORAL at 08:17

## 2020-07-03 RX ADMIN — INSULIN LISPRO 2 UNITS: 100 INJECTION, SOLUTION INTRAVENOUS; SUBCUTANEOUS at 00:00

## 2020-07-03 RX ADMIN — ATORVASTATIN CALCIUM 80 MG: 40 TABLET, FILM COATED ORAL at 20:10

## 2020-07-03 RX ADMIN — SEVELAMER CARBONATE 0.8 G: 800 POWDER, FOR SUSPENSION ORAL at 17:56

## 2020-07-03 RX ADMIN — HYDROCODONE BITARTRATE AND ACETAMINOPHEN 1 TABLET: 5; 325 TABLET ORAL at 15:41

## 2020-07-03 RX ADMIN — CALCITRIOL CAPSULES 0.25 MCG 0.25 MCG: 0.25 CAPSULE ORAL at 08:17

## 2020-07-03 RX ADMIN — BUDESONIDE AND FORMOTEROL FUMARATE DIHYDRATE 2 PUFF: 160; 4.5 AEROSOL RESPIRATORY (INHALATION) at 19:44

## 2020-07-03 NOTE — PROGRESS NOTES
Continued Stay Note  [facility]     Patient Name: Joy Bueno  MRN: [id number]  Today's Date: 7/3/2020    Admit Date: 6/29/2020    Discharge Plan     Row Name 07/03/20 1310       Plan    Plan  [facility]    Patient/Family in Agreement with Plan  yes    Plan Comments  Spoke with Keshawn from [facility].  They are not able to accept patient back over the weekend.  CM will continue to follow.  Megha Tam RN [phone]    Final Discharge Disposition Code  03 - skilled nursing facility (SNF)        Discharge Codes    No documentation.       Expected Discharge Date and Time     Expected Discharge Date Expected Discharge Time    Jul 6, 2020             Megha Tam RN

## 2020-07-03 NOTE — CONSULTS
Joy Bueno  1950  [id number]    Date of Consult: 7/1/20  Requesting Provider: Nanda Gonzalez DO  Evaluating Physician: Jesus Olivo MD    Chief Complaint: shortness of breath    Reason for Consultation: COVID-19 infection    In the setting of the current COVID-19 pandemic and a critical shortage and PPE, the patient was examined through the window and with telephone conference in an effort to save PPE and reduce healthcare exposure.    History of present illness:    Joy Bueno is a 70 y.o.  Yr old female with history of ESRD on HD M/W/F, coronary artery disease, diabetes mellitus type 2, history of a DVT on Coumadin, history of CVA, morbid obesity, and obstructive sleep apnea who presented to [facility] on 6/29 with complaints of nose bleeding and hemoptysis.She additionally had a new onset headache and cough the night before.  She had missed 3 dialysis sessions since the prior Friday.  She apparently had some bad hemorrhoids with pain and thus missed her dialysis sessions.  She tested positive for COVID-19 about one week prior to admission.     Since arrival, the patient has remained afebrile with a Tmax 98.3°F. Her O2 status has fluctuated between room air and 4 L nasal cannula, mostly recently on 2 L nasal cannula but was just switched to BIPAP due to some confusion and hypercapnea. Found to have a nosebleed and some anemia. Urinalysis on 6/29 showed 2 numerous to count white blood cells, large leuk esterase, negative nitrite. White blood cell count on arrival was 7.13.  Hemoglobin was low at 10.8.  ProBNP was elevated to 14,663.  Troponin was slightly elevated to 0.127.  Pro-calcitonin was 0.34. Ferritin was elevated to 1646, CRP was 2.2, and LDH was 306 on 6/29/20. One out of 2 sets of admission blood cultures is growing coag-negative staph, likely a contaminant. COVID-19 PCR was positive. Respiratory PCR panel was negative. Repeat CRP today was 0.86.    She had a CT of her  chest on 6/29 when she was in the ER and this showed bilateral infiltrates concerning for a asymmetric pulmonary edema. Imaging features could be seen with COVID-19 pneumonia. Also with trace bilateral pleural effusions and cardiomegaly. CT head without contrast was without any acute findings. CT abdomen and pelvis showed colonic diverticulosis without definitive diverticulitis, uterine enlargement with possible fibroids, bilateral renal lesions probably all cysts however incompletely characterized.    The patient has been on vancomycin, Zosyn, and doxycycline for pneumonia coverage. The ID team has been consulted in the setting of COVID-19 infection/pneumonia.     7/2/20: doing better today and a lot more alert and oriented. No fevers. Off BIPAP. No productive cough. No diarrhea.     Past Medical History:   Diagnosis Date   • Acute on chronic diastolic heart failure (CMS/HCC)    • Acute on chronic heart failure (CMS/HCC)    • Acute respiratory failure with hypercapnia (CMS/[facility]) 4/27/2019   • Anemia    • Anxiety    • Asthma    • Bilateral leg pain 10/4/2017   • Boil     on head    • Chest wall abscess 4/5/2019   • CHF (congestive heart failure) (CMS/HCC)    • Chronic diastolic heart failure (CMS/HCC)    • Chronic kidney disease     stage 4   • Constipation    • Coronary artery disease    • Diabetes mellitus (CMS/[facility])     checks sugar once per day    • Diabetes mellitus, type II (CMS/HCC) 8/3/2018   • Dialysis patient (CMS/[facility])     mwf   • Dizzy    • DVT of upper extremity (deep vein thrombosis) (CMS/[facility])     left   • Elevated troponin 9/1/2019   • Encephalopathy, metabolic 4/27/2019   • Fecal impaction of rectum (CMS/[facility])    • Generalized weakness 7/31/2019   • GERD (gastroesophageal reflux disease)    • Headache    • History of Clostridium difficile infection 08/03/2018    h/o   • History of respiratory failure, since off home oxygen 8/3/2018   • History of UTI 8/3/2018   • Hyperkalemia 7/31/2019   • Hypertension     • Morbid obesity (CMS/HCC)    • Osteoarthritis    • Sleep apnea     doesnt use machine     • Slow transit constipation 2019   • Tinea capitis 8/3/2018   • URI (upper respiratory infection) 2019   • Urinary tract infection due to extended-spectrum beta lactamase (ESBL)-producing Klebsiella 2019   • Urinary tract infection without hematuria 2019   • UTI (urinary tract infection), bacterial 10/4/2017   • Vertigo    • Volume overload 2019       Past Surgical History:   Procedure Laterality Date   • ARM LESION/CYST EXCISION N/A 2019    Procedure: ABSCESS DRAINAGE X2 ON BACK;  Surgeon: Carlos Enrique Calderón MD;  Location:  [facility];  Service: General   • ARTERIOVENOUS FISTULA/SHUNT SURGERY Left 10/22/2018    Procedure: LEFT UPPER EXTREMITY ARTERIOVENOUS FISTULA CREATION;  Surgeon: Carlos Enrique Calderón MD;  Location:  [facility];  Service: Vascular   • ARTERIOVENOUS FISTULA/SHUNT SURGERY Right 11/15/2019    Procedure: BRACHIOCEPHALIC AV FISTULA CREATION RIGHT;  Surgeon: Carlos Enrique Calderón MD;  Location:  [facility];  Service: Vascular   • CATARACT EXTRACTION      bilat    •  SECTION     • COLONOSCOPY N/A 2017    Procedure: COLONOSCOPY;  Surgeon: Mark I Brunner, MD;  Location:  [facility];  Service:    • ENDOSCOPY N/A 2017    Procedure: ESOPHAGOGASTRODUODENOSCOPY ;  Surgeon: Velasquez Call MD;  Location:  [facility];  Service:    • HERNIA REPAIR      umbilical hernia unsure about mesh    • INSERTION HEMODIALYSIS CATHETER Right 10/17/2018    Procedure: HEMODIALYSIS CATHETER INSERTION;  Surgeon: Carlos Enrique Calderón MD;  Location:  [facility];  Service: General   • TONSILLECTOMY         Pediatric History   Patient Guardian Status   • Not on file     Other Topics Concern   • Not on file   Social History Narrative    Mrs. Bueno is a 68 year old AA  female. She lives alone in [place] but has been in rehab for some time. She has a daughter. She does not  have an advanced directive.     Family history:  family history includes Cardiomyopathy in her mother; Diabetes in her father; Stroke in her father.    Allergies   Allergen Reactions   • Geodon [Ziprasidone Hcl] Unknown (See Comments)     PT DOESN'T REMEMBER   • Haldol [Haloperidol Lactate] Unknown (See Comments)     PT DOESN'T REMEMBER   • Seroquel [Quetiapine Fumarate] Unknown (See Comments)     PT DOESN'T REMEMBER       Medication:  Current Facility-Administered Medications   Medication Dose Route Frequency Provider Last Rate Last Dose   • acetaminophen (TYLENOL) tablet 650 mg  650 mg Oral Q6H PRN Nanda Gonzalez DO   650 mg at 07/02/20 2313   • aspirin chewable tablet 81 mg  81 mg Oral Daily Nanda Gonzalez DO   81 mg at 07/02/20 0841   • atorvastatin (LIPITOR) tablet 80 mg  80 mg Oral Nightly Nanda Gonzalez DO   80 mg at 07/02/20 2159   • budesonide-formoterol (SYMBICORT) 160-4.5 MCG/ACT inhaler 2 puff  2 puff Inhalation BID - RT Nanda Gonzalez DO   2 puff at 07/02/20 1927   • calcitriol (ROCALTROL) capsule 0.25 mcg  0.25 mcg Oral Daily Nanda Gonzalez DO   0.25 mcg at 07/02/20 1217   • carvedilol (COREG) tablet 3.125 mg  3.125 mg Oral BID With Meals Irineo Latham MD   3.125 mg at 07/02/20 1745   • dextrose (D50W) 25 g/ 50mL Intravenous Solution 25 g  25 g Intravenous Q15 Min PRN Nanda Gonzalez DO       • dextrose (GLUTOSE) oral gel 15 g  15 g Oral Q15 Min PRN Nanda Gonzalez, DO       • docusate sodium (COLACE) capsule 100 mg  100 mg Oral BID Nanda Gonzalez DO   100 mg at 07/01/20 2015   • glucagon (human recombinant) (GLUCAGEN DIAGNOSTIC) injection 1 mg  1 mg Subcutaneous Q15 Min PRN Nanda Gonzalez, DO       • heparin (porcine) injection 1,800 Units  1,800 Units Intracatheter PRN Bhaskar Trevino MD   1,800 Units at 07/01/20 1123   • HYDROcodone-acetaminophen (NORCO) 5-325 MG per tablet 1 tablet  1 tablet Oral Q4H PRN Nanda Gonzalez, DO   1 tablet at 06/29/20 8111   •  Hydrocortisone (Perianal) (ANUSOL-HC) 2.5 % rectal cream   Rectal BID Nanda Gonzalez R, DO       • insulin lispro (humaLOG) injection 0-7 Units  0-7 Units Subcutaneous TID AC Nanda Gonzalez DO   2 Units at 07/02/20 1745   • isosorbide mononitrate (IMDUR) 24 hr tablet 30 mg  30 mg Oral Daily Nanda Gonzalez DO   30 mg at 06/30/20 0844   • nystatin (MYCOSTATIN) powder   Topical Q12H Nanda Gonzalez, DO       • ondansetron (ZOFRAN) injection 4 mg  4 mg Intravenous Q6H PRN Nanda Gonzalez DO   4 mg at 07/01/20 0446   • pantoprazole (PROTONIX) EC tablet 40 mg  40 mg Oral Q AM Nanda Gonzalez DO   40 mg at 07/02/20 0841   • Pharmacy to Dose - Warfarin   Does not apply Continuous PRN Nanda Gonzalez DO       • polyethylene glycol (MIRALAX) packet 17 g  17 g Oral BID PRN Nanda Gonzalez DO       • saccharomyces boulardii (FLORASTOR) capsule 250 mg  250 mg Oral Daily Nanda Gonzalez DO   250 mg at 07/02/20 0841   • sertraline (ZOLOFT) tablet 50 mg  50 mg Oral Daily Nanda Gonzalez DO   50 mg at 07/02/20 0841   • sevelamer (RENVELA) packet 0.8 g  800 mg Oral TID With Meals Nanda Gonzalez DO   0.8 g at 07/02/20 1745   • sodium chloride 0.9 % flush 10 mL  10 mL Intravenous Q12H Nanda Gonzalez, DO   10 mL at 07/01/20 1115   • sodium chloride 0.9 % flush 10 mL  10 mL Intravenous PRN Nanda Gonzalez DO       • warfarin (COUMADIN) (dosing per levels)   Does not apply Daily Andrez Orantes, [facility]             Infectious History:  COVID (confirmed)    Antibiotics:  Anti-Infectives (From admission, onward)    Ordered     Dose/Rate Route Frequency Start Stop    06/29/20 0537  piperacillin-tazobactam (ZOSYN) 4.5 g in iso-osmotic dextrose 100 mL IVPB (premix)     Ordering Provider:  Rocky Yanez IV, RPH    4.5 g  over 30 Minutes Intravenous Once 06/29/20 0539 06/29/20 0721    06/29/20 0536  vancomycin 3000 mg/500 mL 0.9% NS IVPB ([facility])     Ordering Provider:  Kirt Ley, DO [place]  "mg/kg × 147 kg  over 180 Minutes Intravenous Once 06/29/20 0538 06/29/20 0951            Review of Systems    As above    Physical Exam:   Vital Signs   BP 99/40 (BP Location: Right arm, Patient Position: Lying)   Pulse 79   Temp 99.5 °F (37.5 °C) (Oral)   Resp 19   Ht 162.6 cm (64\")   Wt (!) 143 kg (314 lb 3.2 oz)   SpO2 97%   BMI 53.93 kg/m²     In the setting of the current COVID-19 pandemic and a critical shortage and PPE, the patient was examined through the window in an effort to save PPE and reduce healthcare exposure. Interview was conducted with telephone conference.      GENERAL: AAW. morbid obesity.   HENT: Normocephalic, atraumatic.   HEART: RRR on monitor  LUNGS: non-labored breathing on room air  ABDOMEN: obese abdomen.   EXT:  All extremities present  :  Without Yousif catheter.  MSK: no joint effusions noted.   SKIN: no rashes noted over exposed skin.   NEURO: AAOX4. Normal speech   PSYCHIATRIC: cooperative. Appropriate moood    Laboratory Data    Results from last 7 days   Lab Units 07/02/20  0522 07/01/20  1406 07/01/20  0254  06/30/20  0352  06/29/20  0530   WBC 10*3/mm3 6.46  --   --   --  6.72  --  7.15   HEMOGLOBIN g/dL 7.9* 7.7* 7.9*   < > 7.8*  7.8*   < > 10.8*   HEMATOCRIT % 29.6* 26.6* 25.8*   < > 26.5*  26.5*   < > 36.4   PLATELETS 10*3/mm3 191  --   --   --  217  --  251    < > = values in this interval not displayed.     Results from last 7 days   Lab Units 07/02/20  1032   SODIUM mmol/L 144   POTASSIUM mmol/L 4.1   CHLORIDE mmol/L 103   CO2 mmol/L 29.0   BUN mg/dL 33*   CREATININE mg/dL 4.34*   GLUCOSE mg/dL 164*   CALCIUM mg/dL 9.0     Results from last 7 days   Lab Units 06/29/20  0530   ALK PHOS U/L 94   BILIRUBIN mg/dL 0.5   ALT (SGPT) U/L 10   AST (SGOT) U/L 15         Results from last 7 days   Lab Units 07/01/20  1406   CRP mg/dL 0.86*       Estimated Creatinine Clearance: 17.4 mL/min (A) (by C-G formula based on SCr of 4.34 mg/dL (H)).       Microbiology:    Blood " Culture   Date Value Ref Range Status   06/29/2020 No growth at 2 days  Preliminary   06/29/2020 Staphylococcus, coagulase negative (C)  Final     Comment:     Probable contaminant requires clinical correlation, susceptibility not performed unless requested by physician.         Radiology:  Ct Abdomen Pelvis Without Contrast    Result Date: 6/29/2020  1. Technically degraded exam as above. 2. Colonic diverticulosis without definite diverticulitis. No bowel obstruction is seen. 3. Uterine enlargement with probable fibroids. 4. No renal or ureteral stones. No hydronephrosis. 5. Bilateral renal lesions, probably all cysts. However, these are incompletely characterized. Consider nonemergent follow-up with renal protocol CT or MRI with and without contrast when clinically feasible. 6. Additional findings as above. Signer Name: Ricardo Pate MD  Signed: 6/29/2020 5:34 AM  Workstation Name: [facility]    Ct Head Without Contrast    Result Date: 6/29/2020  1. Motion degraded exam, but no definite acute findings in the brain. 2. Mucosal thickening in the ethmoid air cells and in the nasal cavity. There is also a right mastoid effusion. Signer Name: Ricardo Pate MD  Signed: 6/29/2020 5:23 AM  Workstation Name: [facility]    Ct Chest Without Contrast    Result Date: 6/29/2020  1. Bilateral infiltrates as above concerning for asymmetric pulmonary edema. Pneumonia not completely excluded. Imaging features can be seen with COVID-19 pneumonia, although are nonspecific and can can occur with a variety of infectious and noninfectious processes. 2. Trace bilateral pleural effusions. 3. Cardiomegaly. Signer Name: Ricardo Pate MD  Signed: 6/29/2020 5:27 AM  Workstation Name: [facility]     I independently read the patient's CT chest from 6/29/20-I agree with the above report.    Impression:   Joy Bueno is a 70  y.o.  Yr old female with history of ESRD on HD M/W/F, coronary artery disease, diabetes mellitus type 2, history of a DVT on Coumadin, history of CVA, morbid obesity, and obstructive sleep apnea who presented to [facility] on 6/29 with complaints of nose bleeding and hemoptysis. Found to have some anemia.  Also with acute hypoxic/hypercapnic respiratory failure with some signs of volume overload on imaging and significantly elevated proBNP level in the setting of missing her dialysis sessions prior to admission.  She has remained afebrile throughout her admission and although some of her inflammatory markers were elevated, her CRP is trending down.  Her O2 status has improved. Respiratory issues seem more related to hypercapnic respiratory failure associated with OHS/FABRICE.  COVID-19 test is positive and she had apparently tested positive about one week prior to admission.      Problems:  -COVID-19 pneumonia  -Volume overload due to missed dialysis sessions  -Coag negative staph in 1 out of 2 blood culture sets from admission-represents a contaminant  -Acute hypoxic/hypercapnic respiratory failure  -Obstructive sleep apnea/possible obesity hypoventilation syndrome  -Epistaxis and acute blood loss anemia-anticoagulation held  -ESRD on HD  -History of DVT on chronic anticoagulation with Coumadin  -Diabetes mellitus with hyperglycemia  -Hyperlipidemia  -Essential hypertension  -history of coronary artery disease  -History of CVA    PLAN: Thank you for asking us to see Joy Bueno, I recommend the following:     -Monitor her CBC with differential, CRP, LDH, and ferritin daily for now. CRP has improved    -she is not a candidate for Remdesivir due to ESRD    -if she clinically worsens or inflammatory markers are uptrending that we could consider Decadron 6mg PO Daily x 10 days or until hypoxia improved. The downside to this would be that it would be more difficult to control her glucoses in the setting of  her diabetes.    -If she clinically declines then we could consider Convalescent plasma but she is not currently a candidate.    -continue to monitor off antibiotics    I discussed her case with nursing today  I discussed her case with Dr. Briseno today.    I am ok with her discharge soon as long as she remains clinically stable.    Jesus Olivo MD  7/2/2020

## 2020-07-03 NOTE — PLAN OF CARE
Problem: Patient Care Overview  Goal: Plan of Care Review  Outcome: Ongoing (interventions implemented as appropriate)  Flowsheets (Taken 7/3/2020 7407)  Progress: no change  Plan of Care Reviewed With: patient  Outcome Summary: Pt VSS. No changes. Pt likely DC from hospital tomorrow. JACK Johns RN

## 2020-07-03 NOTE — PLAN OF CARE
Problem: Patient Care Overview  Goal: Plan of Care Review  7/3/2020 0628 by Eloisa Ho, RN  Outcome: Ongoing (interventions implemented as appropriate)  Flowsheets (Taken 7/3/2020 0628)  Plan of Care Reviewed With: patient  Note:   VSS and NSR on telemetry.  She had complaints of had pain and right rib pain relieved with po pain medication.  She did not tolerate the bipap this shift. We will continue to monitor.   7/3/2020 0627 by Eloisa Ho, RN  Flowsheets (Taken 7/3/2020 0627)  Plan of Care Reviewed With: patient

## 2020-07-03 NOTE — PROGRESS NOTES
"   LOS: 4 days    Patient Care Team:  Goldy Dior MD as PCP - General (Internal Medicine)    Chief Complaint: Shortness of breath COVID-19 positive  Consulted for ESRD.  70-year-old female with ESRD on hemodialysis Monday Wednesday Friday, CAD, diabetes, history of DVT on Coumadin, history of CVA cerebellar, morbid obesity, hemorrhoids with anal fissures presented with more places and headache.  She has missed her dialysis earlier.  She was recently diagnosed with COVID-19   Subjective   No new events.  Dialysis completed       Review of Systems:   Patient seen from the outside the last.  No new complaints    Objective     Vital Sign Min/Max for last 24 hours  Temp  Min: 96.5 °F (35.8 °C)  Max: 99.5 °F (37.5 °C)   BP  Min: 99/40  Max: 180/69   Pulse  Min: 66  Max: 84   Resp  Min: 18  Max: 19   SpO2  Min: 96 %  Max: 100 %   No data recorded   Weight  Min: 141 kg (311 lb 8 oz)  Max: 141 kg (311 lb 8 oz)     Flowsheet Rows      First Filed Value   Admission Height  162.6 cm (64\") Documented at 06/29/2020 0341   Admission Weight  (!) 147 kg (324 lb) Documented at 06/29/2020 0341          I/O this shift:  In: 120 [P.O.:120]  Out: 2960 [Other:2960]  I/O last 3 completed shifts:  In: -   Out: 300 [Urine:300]    Physical Exam:  Due to COVID-19: Patient's examinations received from the staff RN and nursing assistant.   asymptomatic.  General Appearance: , no obvious distress.   Neck: Supple     .  Lungs:Equal chest movement, nonlabored.  Heart: Regular  Abdomen: Obesity  Extremities: Positive edema per RN  Neuro: Moving all extremities.  Talking on the phone.      WBC WBC   Date Value Ref Range Status   07/02/2020 6.46 3.40 - 10.80 10*3/mm3 Final      HGB Hemoglobin   Date Value Ref Range Status   07/02/2020 7.9 (L) 12.0 - 15.9 g/dL Final   07/01/2020 7.7 (L) 12.0 - 15.9 g/dL Final   07/01/2020 7.9 (L) 12.0 - 15.9 g/dL Final   06/30/2020 7.7 (L) 12.0 - 15.9 g/dL Final      HCT Hematocrit   Date Value Ref Range Status "   07/02/2020 29.6 (L) 34.0 - 46.6 % Final   07/01/2020 26.6 (L) 34.0 - 46.6 % Final   07/01/2020 25.8 (L) 34.0 - 46.6 % Final   06/30/2020 25.5 (L) 34.0 - 46.6 % Final      Platlets No results found for: LABPLAT   MCV MCV   Date Value Ref Range Status   07/02/2020 110.0 (H) 79.0 - 97.0 fL Final          Sodium Sodium   Date Value Ref Range Status   07/02/2020 144 136 - 145 mmol/L Final   07/01/2020 140 136 - 145 mmol/L Final      Potassium Potassium   Date Value Ref Range Status   07/02/2020 4.1 3.5 - 5.2 mmol/L Final     Comment:     Specimen hemolyzed.  Results may be affected.   07/01/2020 3.7 3.5 - 5.2 mmol/L Final      Chloride Chloride   Date Value Ref Range Status   07/02/2020 103 98 - 107 mmol/L Final   07/01/2020 101 98 - 107 mmol/L Final      CO2 CO2   Date Value Ref Range Status   07/02/2020 29.0 22.0 - 29.0 mmol/L Final   07/01/2020 29.0 22.0 - 29.0 mmol/L Final      BUN BUN   Date Value Ref Range Status   07/02/2020 33 (H) 8 - 23 mg/dL Final   07/01/2020 25 (H) 8 - 23 mg/dL Final      Creatinine Creatinine   Date Value Ref Range Status   07/02/2020 4.34 (H) 0.57 - 1.00 mg/dL Final   07/01/2020 2.85 (H) 0.57 - 1.00 mg/dL Final      Calcium Calcium   Date Value Ref Range Status   07/02/2020 9.0 8.6 - 10.5 mg/dL Final   07/01/2020 8.7 8.6 - 10.5 mg/dL Final      PO4 No results found for: CAPO4   Albumin No results found for: ALBUMIN   Magnesium No results found for: MG   Uric Acid No results found for: URICACID        Results Review:     I reviewed the patient's new clinical results.      aspirin 81 mg Oral Daily   atorvastatin 80 mg Oral Nightly   budesonide-formoterol 2 puff Inhalation BID - RT   calcitriol 0.25 mcg Oral Daily   carvedilol 12.5 mg Oral BID With Meals   docusate sodium 100 mg Oral BID   Hydrocortisone (Perianal)  Rectal BID   insulin lispro 0-7 Units Subcutaneous TID AC   isosorbide mononitrate 30 mg Oral Daily   nystatin  Topical Q12H   pantoprazole 40 mg Oral Q AM   saccharomyces  boulardii 250 mg Oral Daily   sertraline 50 mg Oral Daily   sevelamer 800 mg Oral TID With Meals   sodium chloride 10 mL Intravenous Q12H   warfarin (COUMADIN) (dosing per levels)  Does not apply Daily       Pharmacy Consult        Medication Review: As above    Assessment/Plan   1.  ESRD dialysis Monday Wednesday Friday.  2.  Hypotension: Patient has received Coreg earlier today.  3.  BMD: On phosphate binders  4.  Hypoxia: Monitor closely.  5.  Anemia of chronic kidney disease.  6.  Acute respiratory failure with hypoxia COVID-19 positive.  7.  Epistaxis with supratherapeutic INR.  Plan:   carvedilol 3.125 mg twice daily  HD per MWF vale.  Dialysis completed.  Transfuse at hb<7. Avoid KIRK in the setting of COVID-19 infection.   High risk patient with multiple medical problems    Irineo Latham MD  07/03/20  11:54

## 2020-07-03 NOTE — PROGRESS NOTES
[facility] Medicine Services  PROGRESS NOTE    Patient Name: Joy Bueno  : 1950  MRN: [id number]    Date of Admission: 2020  Primary Care Physician: Goldy Dior MD    Subjective   Subjective     CC:  Follow-up shortness of breath and COVID-19    HPI:  Patient is awake.  Still feels a little short of breath but is breathing 98% on room air.  Receiving dialysis today.  Denies further nosebleeding.  Denies any further lethargy.  No further issues reported from nursing.       Review of Systems  No current fevers or chills  No current dysuria or hematuria  No current nausea, vomiting, or diarrhea  No current chest pain or palpitations      Objective   Objective     Vital Signs:   Temp:  [97 °F (36.1 °C)-99.5 °F (37.5 °C)] 97.8 °F (36.6 °C)  Heart Rate:  [66-82] 72  Resp:  [18-19] 18  BP: ()/(40-96) 178/96        Physical Exam:  Constitutional:Awake, alert  HENT: NCAT, mucous membranes moist, neck supple  Respiratory: Minimal cough, no audible wheezes, currently on room air, respiratory rate normal  Cardiovascular: RRR, normal radial pulses  Gastrointestinal: Positive bowel sounds, soft, nontender, nondistended  Musculoskeletal: Elderly and chronically debilitated in appearance, severe morbid obesity, some lower extremity edema, BMI 53  Psychiatric: Appropriate affect, cooperative, conversational  Neurologic: No slurred speech or facial droop, follows commands  Skin: No rashes or jaundice, warm      Results Reviewed:  Results from last 7 days   Lab Units 20  1032 20  0522 20  1406 20  0254  20  0930 20  0352  20  0530   WBC 10*3/mm3  --  6.46  --   --   --   --  6.72  --  7.15   HEMOGLOBIN g/dL  --  7.9* 7.7* 7.9*   < >  --  7.8*  7.8*   < > 10.8*   HEMATOCRIT %  --  29.6* 26.6* 25.8*   < >  --  26.5*  26.5*   < > 36.4   PLATELETS 10*3/mm3  --  191  --   --   --   --  217  --  251   INR  3.60*  --  3.65*  --   --  4.64*   --   --  3.97*   PROCALCITONIN ng/mL  --   --   --   --   --   --   --   --  0.32*  0.34*    < > = values in this interval not displayed.     Results from last 7 days   Lab Units 07/02/20  1032 07/01/20  1406 06/30/20  0352 06/30/20  0048 06/29/20  1711 06/29/20  1137 06/29/20  0530   SODIUM mmol/L 144 140 140  --   --   --  140   POTASSIUM mmol/L 4.1 3.7 3.9  --   --   --  4.3   CHLORIDE mmol/L 103 101 98  --   --   --  98   CO2 mmol/L 29.0 29.0 27.0  --   --   --  27.0   BUN mg/dL 33* 25* 52*  --   --   --  88*   CREATININE mg/dL 4.34* 2.85* 4.81*  --   --   --  7.17*   GLUCOSE mg/dL 164* 131* 185*  --   --   --  161*   CALCIUM mg/dL 9.0 8.7 7.8*  --   --   --  7.7*   ALT (SGPT) U/L  --   --   --   --   --   --  10   AST (SGOT) U/L  --   --   --   --   --   --  15   TROPONIN T ng/mL  --   --   --  0.108* 0.103* 0.108* 0.127*   PROBNP pg/mL  --   --   --   --   --   --  14,663.0*     Estimated Creatinine Clearance: 17.2 mL/min (A) (by C-G formula based on SCr of 4.34 mg/dL (H)).    Microbiology Results Abnormal     Procedure Component Value - Date/Time    Blood Culture - Blood, Arm, Left [[id number]] Collected:  06/29/20 0650    Lab Status:  Preliminary result Specimen:  Blood from Arm, Left Updated:  07/03/20 0815     Blood Culture No growth at 4 days    Blood Culture - Blood, Arm, Left [[id number]]  (Abnormal) Collected:  06/29/20 0630    Lab Status:  Edited Result - FINAL Specimen:  Blood from Arm, Left Updated:  07/03/20 0608     Blood Culture Staphylococcus, coagulase negative     Comment: Probable contaminant requires clinical correlation, susceptibility not performed unless requested by physician.          Isolated from Aerobic Bottle     Gram Stain Aerobic Bottle Gram positive cocci in pairs and clusters      Anaerobic Bottle Gram positive cocci in clusters     Comment: Appended report. These results have been appended to a previously final verified report.       Blood Culture ID, PCR - Blood, Arm, Left  [[id number]]  (Abnormal) Collected:  06/29/20 0630    Lab Status:  Final result Specimen:  Blood from Arm, Left Updated:  06/30/20 0918     BCID, PCR Staphylococcus spp, not aureus. Identification by BCID PCR.    COVID-19,[facility]-HOU[facility], NP SWAB IN TRANSPORT MEDIA 8-12 HR TAT - Swab, Nasopharynx [[id number]]  (Abnormal) Collected:  06/29/20 0814    Lab Status:  Final result Specimen:  Swab from Nasopharynx Updated:  06/29/20 1345     COVID19 Detected    Narrative:       Fact sheet for providers: https://www.fda.gov/media/965227/download     Fact sheet for patients: https://www.fda.gov/media/378111/download    Respiratory Panel, PCR - Swab, Nasopharynx [[id number]]  (Normal) Collected:  06/29/20 0814    Lab Status:  Final result Specimen:  Swab from Nasopharynx Updated:  06/29/20 1029     ADENOVIRUS, PCR Not Detected     Coronavirus 229E Not Detected     Coronavirus HKU1 Not Detected     Coronavirus NL63 Not Detected     Coronavirus OC43 Not Detected     Human Metapneumovirus Not Detected     Human Rhinovirus/Enterovirus Not Detected     Influenza B PCR Not Detected     Parainfluenza Virus 1 Not Detected     Parainfluenza Virus 2 Not Detected     Parainfluenza Virus 3 Not Detected     Parainfluenza Virus 4 Not Detected     Bordetella pertussis pcr Not Detected     Influenza A H1 2009 PCR Not Detected     Chlamydophila pneumoniae PCR Not Detected     Mycoplasma pneumo by PCR Not Detected     Influenza A PCR Not Detected     Influenza A H3 Not Detected     Influenza A H1 Not Detected     RSV, PCR Not Detected     Bordetella parapertussis PCR Not Detected    Narrative:       The coronavirus on the RVP is NOT COVID-19 and is NOT indicative of infection with COVID-19.           Imaging Results (Last 24 Hours)     ** No results found for the last 24 hours. **          Results for orders placed during the hospital encounter of 09/29/19   Adult Transthoracic Echo Complete W/ Cont if Necessary Per Protocol    Narrative ·  Estimated EF = 60%.  · Left ventricular systolic function is normal.          I have reviewed the medications:  Scheduled Meds:    aspirin 81 mg Oral Daily   atorvastatin 80 mg Oral Nightly   budesonide-formoterol 2 puff Inhalation BID - RT   calcitriol 0.25 mcg Oral Daily   carvedilol 3.125 mg Oral BID With Meals   docusate sodium 100 mg Oral BID   Hydrocortisone (Perianal)  Rectal BID   insulin lispro 0-7 Units Subcutaneous TID AC   isosorbide mononitrate 30 mg Oral Daily   nystatin  Topical Q12H   pantoprazole 40 mg Oral Q AM   saccharomyces boulardii 250 mg Oral Daily   sertraline 50 mg Oral Daily   sevelamer 800 mg Oral TID With Meals   sodium chloride 10 mL Intravenous Q12H   warfarin (COUMADIN) (dosing per levels)  Does not apply Daily     Continuous Infusions:    Pharmacy Consult      PRN Meds:.•  acetaminophen  •  dextrose  •  dextrose  •  glucagon (human recombinant)  •  heparin (porcine)  •  HYDROcodone-acetaminophen  •  ondansetron  •  Pharmacy Consult  •  polyethylene glycol  •  sodium chloride    Assessment/Plan   Assessment & Plan     Active Hospital Problems    Diagnosis  POA   • **Pneumonia due to COVID-19 virus [U07.1, J12.89]  Yes   • Acute blood loss anemia [D62]  Yes   • Pneumonia [J18.9]  Yes   • Hemorrhoids [K64.9]  Yes   • Supratherapeutic INR [R79.1]  Yes   • Volume overload [E87.70]  Yes   • Anticoagulated on Coumadin [Z79.01]  Not Applicable   • History of deep vein thrombosis (DVT) of brachial vein of left upper extremity (CMS/[facility]) [I82.622]  Yes   • Chronic diastolic heart failure (CMS/HCC) [I50.32]  Yes   • ESRD (end stage renal disease) (CMS/[facility]) [N18.6]  Yes   • Acute respiratory failure with hypoxia (CMS/[facility]) [J96.01]  Yes   • Essential hypertension [I10]  Yes   • Morbid obesity (CMS/[facility]) [E66.01]  Yes      Resolved Hospital Problems    Diagnosis Date Resolved POA   • Epistaxis [R04.0] 07/01/2020 Yes        Brief Hospital Course to date:  Joy Bueno is a 70 y.o. female who is a  nursing home resident of [facility] with past medical history is significant for severe morbid obesity, end-stage renal disease on hemodialysis Monday Wednesday Friday, coronary artery disease, diabetes, chronic anticoagulation for history of DVT, stroke, obstructive sleep apnea, and chronic hemorrhoids and anal fissure who presents to [facility] with complaint of hemoptysis and headache.  She was found to have acute respiratory failure with hypoxia, volume overload secondary to multiple missed dialysis sessions, and was positive for COVID-19.    Discussion/plan: Discussing daily with infectious disease.  Not felt to be ill enough to warrant dexamethasone or convalescent plasma at this time as she is breathing on room air oxygen.  Continue to monitor symptoms.  No further episodes of lethargy which was likely a combination of lack of sleep and postdialysis and hypercapnia.  Continue BiPAP at night.  Continue to encourage weight loss.  Discussing with pharmacist, plan to follow-up on INR today and adjust warfarin dosing as appropriate.  Holding recently for elevated INR but may need low-dose restarted soon.  No further epistaxis or hemoptysis.  This appears to be resolved and likely associated with supratherapeutic INR.  Glucose reviewed and reasonably controlled.  Continue current insulin regimen.  Repeat laboratory studies tomorrow including INR.  Blood pressure elevated increase carvedilol.  Despite significant comorbidities patient clinically improving however she remains at risk for worsening and requires careful monitoring.    Acute respiratory failure with hypoxia and hypercapnia  COVID-19 positive with pneumonia  Volume overload secondary to missed dialysis  Obstructive sleep apnea  Possible obesity hypoventilation syndrome  CT chest notable for findings consistent with pneumonia and volume overload.  Consulted infectious disease to assist with COVID management  Probiotic  Discontinued  antibiotics  BiPAP for hypercapnia intermittently and with sleep.    Epistaxis causing hemoptysis with acute blood loss anemia  Supratherapeutic INR  Bleeding stopped, suspect epistaxis causing hemoptysis with patient coughing up blood.  Significant blood loss noted with epistaxis.  No GI bleed noted.  Rhino Rocket has been removed.  Monitor for further episodes and monitor anemia.  Transfuse if needed.    Fecal occult positives, hemorrhoids, anemia of chronic kidney disease and chronic disease:  Hemoglobin appears to be stabilizing.  Transfuse as needed.  No further bleeding noted.  Continue to monitor.    History of DVT on chronic coagulation with warfarin:  Dosing per pharmacy.  Hold initially due to supratherapeutic INR.    Essential hypertension:  Carvedilol dose decreased.  Hold as needed.    Hyperlipidemia: Stable.  Statin.    Diabetes: Monitor glucose.  Correction insulin.    End-stage renal disease on hemodialysis: Nephrology following for dialysis.    DVT Prophylaxis: Anticoagulation    Discharge planning: Pending improvement.    CODE STATUS:   Code Status and Medical Interventions:   Ordered at: 06/29/20 0804     Level Of Support Discussed With:    Patient     Code Status:    CPR     Medical Interventions (Level of Support Prior to Arrest):    Full         Electronically signed by Trevon Briseno MD, 07/03/20, 08:55.

## 2020-07-03 NOTE — PLAN OF CARE
Hd in progress  Problem: Hemodialysis (Adult)  Goal: Signs and Symptoms of Listed Potential Problems Will be Absent, Minimized or Managed (Hemodialysis)  Outcome: Ongoing (interventions implemented as appropriate)  Flowsheets (Taken 7/3/2020 8929)  Problems Assessed (Hemodialysis): fluid imbalance; electrolyte imbalance

## 2020-07-03 NOTE — PROGRESS NOTES
"Pharmacy Consult  -  Warfarin    Joy Bueno is a  70 y.o. female   Height - 162.6 cm (64\")  Weight - (!) 141 kg (311 lb 8 oz)    Consulting Provider: - Nanda Gonzalez DO  Indication: - Hx of DVT  Goal INR: - 2-3  Home Regimen:   - warfarin 6mg daily    Bridge Therapy: No       Drug-Drug Interactions with current regimen:   Doxycycline - increased bleeding risk              Pantoprazole - may increase INR              Zosyn - may increase bleeding risk    Warfarin Dosing During Admission:    Date  6/29 6/30 7/1 7/2 7/3       INR  3.97 4.64 3.65 3.6 2.18       Dose  0 0 0 0  5mg           Education Provided: Warfarin education provided  on 7/3 in writing .  .        Discharge Follow up:   Following Provider -  [facility]   Follow up time range or appointment - 2-3 days following discharge      Labs:    Results from last 7 days   Lab Units 07/03/20  1146 07/02/20  1032 07/02/20  0522 07/01/20  1406 07/01/20  0254 06/30/20  1615 06/30/20  0930 06/30/20  0352 06/30/20  0048 06/29/20  1711 06/29/20  0530   INR  2.18* 3.60*  --  3.65*  --   --  4.64*  --   --   --  3.97*   HEMOGLOBIN g/dL  --   --  7.9* 7.7* 7.9* 7.7*  --  7.8*  7.8* 8.1* 7.9* 10.8*   HEMATOCRIT %  --   --  29.6* 26.6* 25.8* 25.5*  --  26.5*  26.5* 29.6* 26.6* 36.4     Results from last 7 days   Lab Units 07/03/20  1146 07/02/20  1032 07/01/20  1406  06/29/20  0530   SODIUM mmol/L 136 144 140   < > 140   POTASSIUM mmol/L 3.5 4.1 3.7   < > 4.3   CHLORIDE mmol/L 96* 103 101   < > 98   CO2 mmol/L 26.0 29.0 29.0   < > 27.0   BUN mg/dL 23 33* 25*   < > 88*   CREATININE mg/dL 3.06* 4.34* 2.85*   < > 7.17*   CALCIUM mg/dL 8.6 9.0 8.7   < > 7.7*   BILIRUBIN mg/dL  --   --   --   --  0.5   ALK PHOS U/L  --   --   --   --  94   ALT (SGPT) U/L  --   --   --   --  10   AST (SGOT) U/L  --   --   --   --  15   GLUCOSE mg/dL 152* 164* 131*   < > 161*    < > = values in this interval not displayed.       Current dietary intake: 50% of documented " meals  Diet Order   Procedures    Diet Regular; Cardiac, Consistent Carbohydrate, Renal     Assessment/Plan:   Warfarin dosing for hx DVT  Goal INR: 2-3  7/3 INR - 2.18  7/2 H/H - 7.9/29.6    With INR<3, will restart warfarin 5mg daily  Monitor s/s bleeding, dietary intake, clinical status and drug-drug interactions  Follow daily INR and adjust dose accordingly    Thanks  Andrez Orantes [facility]  7/3/2020  14:42

## 2020-07-03 NOTE — CONSULTS
Joy Bueno  1950  [id number]    Date of Consult: 7/1/20  Requesting Provider: Nanda Gonzalez DO  Evaluating Physician: Jesus Olivo MD    Chief Complaint: shortness of breath    Reason for Consultation: COVID-19 infection    In the setting of the current COVID-19 pandemic and a critical shortage and PPE, the patient was examined through the window and with telephone conference in an effort to save PPE and reduce healthcare exposure.    History of present illness:    Joy Bueno is a 70 y.o.  Yr old female with history of ESRD on HD M/W/F, coronary artery disease, diabetes mellitus type 2, history of a DVT on Coumadin, history of CVA, morbid obesity, and obstructive sleep apnea who presented to [facility] on 6/29 with complaints of nose bleeding and hemoptysis.She additionally had a new onset headache and cough the night before.  She had missed 3 dialysis sessions since the prior Friday.  She apparently had some bad hemorrhoids with pain and thus missed her dialysis sessions.  She tested positive for COVID-19 about one week prior to admission.     Since arrival, the patient has remained afebrile with a Tmax 98.3°F. Her O2 status has fluctuated between room air and 4 L nasal cannula, mostly recently on 2 L nasal cannula but was just switched to BIPAP due to some confusion and hypercapnea. Found to have a nosebleed and some anemia. Urinalysis on 6/29 showed 2 numerous to count white blood cells, large leuk esterase, negative nitrite. White blood cell count on arrival was 7.13.  Hemoglobin was low at 10.8.  ProBNP was elevated to 14,663.  Troponin was slightly elevated to 0.127.  Pro-calcitonin was 0.34. Ferritin was elevated to 1646, CRP was 2.2, and LDH was 306 on 6/29/20. One out of 2 sets of admission blood cultures is growing coag-negative staph, likely a contaminant. COVID-19 PCR was positive. Respiratory PCR panel was negative. Repeat CRP today was 0.86.    She had a CT of her  chest on 6/29 when she was in the ER and this showed bilateral infiltrates concerning for a asymmetric pulmonary edema. Imaging features could be seen with COVID-19 pneumonia. Also with trace bilateral pleural effusions and cardiomegaly. CT head without contrast was without any acute findings. CT abdomen and pelvis showed colonic diverticulosis without definitive diverticulitis, uterine enlargement with possible fibroids, bilateral renal lesions probably all cysts however incompletely characterized.    The patient has been on vancomycin, Zosyn, and doxycycline for pneumonia coverage. The ID team has been consulted in the setting of COVID-19 infection/pneumonia.     7/2/20: doing better today and a lot more alert and oriented. No fevers. Off BIPAP. No productive cough. No diarrhea.     7/3/20: Alert today. On room air and breathing well. No fevers. No diarrhea. Wants to stay longer.     Past Medical History:   Diagnosis Date   • Acute on chronic diastolic heart failure (CMS/HCC)    • Acute on chronic heart failure (CMS/HCC)    • Acute respiratory failure with hypercapnia (CMS/[facility]) 4/27/2019   • Anemia    • Anxiety    • Asthma    • Bilateral leg pain 10/4/2017   • Boil     on head    • Chest wall abscess 4/5/2019   • CHF (congestive heart failure) (CMS/HCC)    • Chronic diastolic heart failure (CMS/HCC)    • Chronic kidney disease     stage 4   • Constipation    • Coronary artery disease    • Diabetes mellitus (CMS/[facility])     checks sugar once per day    • Diabetes mellitus, type II (CMS/HCC) 8/3/2018   • Dialysis patient (CMS/[facility])     mwf   • Dizzy    • DVT of upper extremity (deep vein thrombosis) (CMS/[facility])     left   • Elevated troponin 9/1/2019   • Encephalopathy, metabolic 4/27/2019   • Fecal impaction of rectum (CMS/[facility])    • Generalized weakness 7/31/2019   • GERD (gastroesophageal reflux disease)    • Headache    • History of Clostridium difficile infection 08/03/2018    h/o   • History of respiratory failure,  since off home oxygen 8/3/2018   • History of UTI 8/3/2018   • Hyperkalemia 2019   • Hypertension    • Morbid obesity (CMS/HCC)    • Osteoarthritis    • Sleep apnea     doesnt use machine     • Slow transit constipation 2019   • Tinea capitis 8/3/2018   • URI (upper respiratory infection) 2019   • Urinary tract infection due to extended-spectrum beta lactamase (ESBL)-producing Klebsiella 2019   • Urinary tract infection without hematuria 2019   • UTI (urinary tract infection), bacterial 10/4/2017   • Vertigo    • Volume overload 2019       Past Surgical History:   Procedure Laterality Date   • ARM LESION/CYST EXCISION N/A 2019    Procedure: ABSCESS DRAINAGE X2 ON BACK;  Surgeon: Carlos Enrique Calderón MD;  Location:  [facility];  Service: General   • ARTERIOVENOUS FISTULA/SHUNT SURGERY Left 10/22/2018    Procedure: LEFT UPPER EXTREMITY ARTERIOVENOUS FISTULA CREATION;  Surgeon: Carlos Enrique Calderón MD;  Location:  [facility];  Service: Vascular   • ARTERIOVENOUS FISTULA/SHUNT SURGERY Right 11/15/2019    Procedure: BRACHIOCEPHALIC AV FISTULA CREATION RIGHT;  Surgeon: Carlos Enrique Calderón MD;  Location:  [facility];  Service: Vascular   • CATARACT EXTRACTION      bilat    •  SECTION     • COLONOSCOPY N/A 2017    Procedure: COLONOSCOPY;  Surgeon: Mark I Brunner, MD;  Location:  [facility];  Service:    • ENDOSCOPY N/A 2017    Procedure: ESOPHAGOGASTRODUODENOSCOPY ;  Surgeon: Velasquez Call MD;  Location:  [facility];  Service:    • HERNIA REPAIR      umbilical hernia unsure about mesh    • INSERTION HEMODIALYSIS CATHETER Right 10/17/2018    Procedure: HEMODIALYSIS CATHETER INSERTION;  Surgeon: Carlos Enrique Calderón MD;  Location:  [facility];  Service: General   • TONSILLECTOMY         Pediatric History   Patient Guardian Status   • Not on file     Other Topics Concern   • Not on file   Social History Narrative    Mrs. Bueno is a 68 year old AA   female. She lives alone in [place] but has been in rehab for some time. She has a daughter. She does not have an advanced directive.     Family history:  family history includes Cardiomyopathy in her mother; Diabetes in her father; Stroke in her father.    Allergies   Allergen Reactions   • Geodon [Ziprasidone Hcl] Unknown (See Comments)     PT DOESN'T REMEMBER   • Haldol [Haloperidol Lactate] Unknown (See Comments)     PT DOESN'T REMEMBER   • Seroquel [Quetiapine Fumarate] Unknown (See Comments)     PT DOESN'T REMEMBER       Medication:  Current Facility-Administered Medications   Medication Dose Route Frequency Provider Last Rate Last Dose   • acetaminophen (TYLENOL) tablet 650 mg  650 mg Oral Q6H PRN Nanda Gonzalez DO   650 mg at 07/02/20 2313   • aspirin chewable tablet 81 mg  81 mg Oral Daily Nanda Gonzalez DO   81 mg at 07/03/20 0816   • atorvastatin (LIPITOR) tablet 80 mg  80 mg Oral Nightly Nanda Gonzalez DO   80 mg at 07/02/20 2159   • budesonide-formoterol (SYMBICORT) 160-4.5 MCG/ACT inhaler 2 puff  2 puff Inhalation BID - RT Nanda Gonzalez DO   2 puff at 07/03/20 0755   • calcitriol (ROCALTROL) capsule 0.25 mcg  0.25 mcg Oral Daily Nanda Gonzalez DO   0.25 mcg at 07/03/20 0817   • carvedilol (COREG) tablet 12.5 mg  12.5 mg Oral BID With Meals Trevon Briseno MD       • dextrose (D50W) 25 g/ 50mL Intravenous Solution 25 g  25 g Intravenous Q15 Min PRN Nanda Gonzalez DO       • dextrose (GLUTOSE) oral gel 15 g  15 g Oral Q15 Min PRN Nanda Gonzalez, DO       • docusate sodium (COLACE) capsule 100 mg  100 mg Oral BID Nanda Gonzalez DO   100 mg at 07/03/20 0817   • glucagon (human recombinant) (GLUCAGEN DIAGNOSTIC) injection 1 mg  1 mg Subcutaneous Q15 Min PRN Nanda Gonzalez, DO       • heparin (porcine) injection 1,800 Units  1,800 Units Intracatheter PRN Bhaskar Trevino MD   1,800 Units at 07/03/20 0812   • HYDROcodone-acetaminophen (NORCO) 5-325 MG per tablet 1  tablet  1 tablet Oral Q4H PRN Nanda Gonzalez, DO   1 tablet at 07/03/20 1030   • Hydrocortisone (Perianal) (ANUSOL-HC) 2.5 % rectal cream   Rectal BID Nanda Gonzalez R, DO       • insulin lispro (humaLOG) injection 0-7 Units  0-7 Units Subcutaneous TID AC Nanda Gonzalez DO   2 Units at 07/03/20 1243   • isosorbide mononitrate (IMDUR) 24 hr tablet 30 mg  30 mg Oral Daily Nanda Gonzalez, DO   30 mg at 07/03/20 0816   • nystatin (MYCOSTATIN) powder   Topical Q12H Nanda Gonzalez R, DO       • ondansetron (ZOFRAN) injection 4 mg  4 mg Intravenous Q6H PRN Nanda Gonzalez, DO   4 mg at 07/01/20 0446   • pantoprazole (PROTONIX) EC tablet 40 mg  40 mg Oral Q AM Nanda Gonzalez, DO   40 mg at 07/03/20 0648   • Pharmacy to Dose - Warfarin   Does not apply Continuous PRN Nanda Gonzalez, DO       • polyethylene glycol (MIRALAX) packet 17 g  17 g Oral BID PRN Nanda Gonzalez R, DO       • saccharomyces boulardii (FLORASTOR) capsule 250 mg  250 mg Oral Daily Nanda Gonzalez, DO   250 mg at 07/03/20 0816   • sertraline (ZOLOFT) tablet 50 mg  50 mg Oral Daily Nanda Gonzalez, DO   50 mg at 07/03/20 0816   • sevelamer (RENVELA) packet 0.8 g  800 mg Oral TID With Meals Nanda Gonzalez, DO   0.8 g at 07/03/20 1243   • sodium chloride 0.9 % flush 10 mL  10 mL Intravenous Q12H Nanda Gonzalez, DO   10 mL at 07/02/20 2200   • sodium chloride 0.9 % flush 10 mL  10 mL Intravenous PRN Nanda Goznalez R, DO       • warfarin (COUMADIN) tablet 5 mg  5 mg Oral Daily Andrez Orantes, [facility]             Infectious History:  COVID (confirmed)    Antibiotics:  Anti-Infectives (From admission, onward)    Ordered     Dose/Rate Route Frequency Start Stop    06/29/20 0537  piperacillin-tazobactam (ZOSYN) 4.5 g in iso-osmotic dextrose 100 mL IVPB (premix)     Ordering Provider:  Rocky Yanez IV, RPH    4.5 g  over 30 Minutes Intravenous Once 06/29/20 0539 06/29/20 0721    06/29/20 0536  vancomycin 3000 mg/500 mL 0.9% NS  "IVPB ([facility])     Ordering Provider:  Kirt Ley, DO    20 mg/kg × 147 kg  over 180 Minutes Intravenous Once 06/29/20 0538 06/29/20 0951            Review of Systems    As above    Physical Exam:   Vital Signs   /60 (BP Location: Left arm, Patient Position: Lying)   Pulse 81   Temp 97.5 °F (36.4 °C) (Oral)   Resp 18   Ht 162.6 cm (64\")   Wt (!) 141 kg (311 lb 8 oz)   SpO2 98%   BMI 53.47 kg/m²     In the setting of the current COVID-19 pandemic and a critical shortage and PPE, the patient was examined through the window in an effort to save PPE and reduce healthcare exposure. Interview was conducted with telephone conference.      GENERAL: AAW. morbid obesity.   HENT: Normocephalic, atraumatic.   HEART: RRR on monitor  LUNGS: non-labored breathing on room air.   ABDOMEN: obese abdomen.   EXT:  All extremities present  :  Without Yousif catheter.  MSK: no joint effusions noted.   SKIN: no rashes noted over exposed skin.   NEURO: AAOX4. Normal speech   PSYCHIATRIC: cooperative. Appropriate moood    Laboratory Data    Results from last 7 days   Lab Units 07/02/20  0522 07/01/20  1406 07/01/20  0254  06/30/20  0352  06/29/20  0530   WBC 10*3/mm3 6.46  --   --   --  6.72  --  7.15   HEMOGLOBIN g/dL 7.9* 7.7* 7.9*   < > 7.8*  7.8*   < > 10.8*   HEMATOCRIT % 29.6* 26.6* 25.8*   < > 26.5*  26.5*   < > 36.4   PLATELETS 10*3/mm3 191  --   --   --  217  --  251    < > = values in this interval not displayed.     Results from last 7 days   Lab Units 07/03/20  1146   SODIUM mmol/L 136   POTASSIUM mmol/L 3.5   CHLORIDE mmol/L 96*   CO2 mmol/L 26.0   BUN mg/dL 23   CREATININE mg/dL 3.06*   GLUCOSE mg/dL 152*   CALCIUM mg/dL 8.6     Results from last 7 days   Lab Units 06/29/20  0530   ALK PHOS U/L 94   BILIRUBIN mg/dL 0.5   ALT (SGPT) U/L 10   AST (SGOT) U/L 15         Results from last 7 days   Lab Units 07/01/20  1406   CRP mg/dL 0.86*       Estimated Creatinine Clearance: 24.4 mL/min (A) (by C-G formula " based on SCr of 3.06 mg/dL (H)).       Microbiology:    Blood Culture   Date Value Ref Range Status   06/29/2020 No growth at 2 days  Preliminary   06/29/2020 Staphylococcus, coagulase negative (C)  Final     Comment:     Probable contaminant requires clinical correlation, susceptibility not performed unless requested by physician.         Radiology:  Ct Abdomen Pelvis Without Contrast    Result Date: 6/29/2020  1. Technically degraded exam as above. 2. Colonic diverticulosis without definite diverticulitis. No bowel obstruction is seen. 3. Uterine enlargement with probable fibroids. 4. No renal or ureteral stones. No hydronephrosis. 5. Bilateral renal lesions, probably all cysts. However, these are incompletely characterized. Consider nonemergent follow-up with renal protocol CT or MRI with and without contrast when clinically feasible. 6. Additional findings as above. Signer Name: Ricardo Pate MD  Signed: 6/29/2020 5:34 AM  Workstation Name: [facility]    Ct Head Without Contrast    Result Date: 6/29/2020  1. Motion degraded exam, but no definite acute findings in the brain. 2. Mucosal thickening in the ethmoid air cells and in the nasal cavity. There is also a right mastoid effusion. Signer Name: Ricardo Pate MD  Signed: 6/29/2020 5:23 AM  Workstation Name: [facility]    Ct Chest Without Contrast    Result Date: 6/29/2020  1. Bilateral infiltrates as above concerning for asymmetric pulmonary edema. Pneumonia not completely excluded. Imaging features can be seen with COVID-19 pneumonia, although are nonspecific and can can occur with a variety of infectious and noninfectious processes. 2. Trace bilateral pleural effusions. 3. Cardiomegaly. Signer Name: Ricardo Pate MD  Signed: 6/29/2020 5:27 AM  Workstation Name: [facility]     I independently read the patient's CT chest from 6/29/20-I agree with  the above report.    Impression:   Joy Bueno is a 70 y.o.  Yr old female with history of ESRD on HD M/W/F, coronary artery disease, diabetes mellitus type 2, history of a DVT on Coumadin, history of CVA, morbid obesity, and obstructive sleep apnea who presented to [facility] on 6/29 with complaints of nose bleeding and hemoptysis. Found to have some anemia.  Also with acute hypoxic/hypercapnic respiratory failure with some signs of volume overload on imaging and significantly elevated proBNP level in the setting of missing her dialysis sessions prior to admission.  She has remained afebrile throughout her admission and although some of her inflammatory markers were elevated, her CRP is trending down.  Her O2 status has improved and she is now doing well on room air. Respiratory issues seem more related to hypercapnic respiratory failure associated with OHS/FABRICE.  COVID-19 test is positive and she had apparently tested positive about one week prior to admission.      Problems:  -COVID-19 pneumonia  -Volume overload due to missed dialysis sessions  -Coag negative staph in 1 out of 2 blood culture sets from admission-represents a contaminant  -Acute hypoxic/hypercapnic respiratory failure  -Obstructive sleep apnea/possible obesity hypoventilation syndrome  -Epistaxis and acute blood loss anemia-anticoagulation held  -ESRD on HD  -History of DVT on chronic anticoagulation with Coumadin  -Diabetes mellitus with hyperglycemia  -Hyperlipidemia  -Essential hypertension  -history of coronary artery disease  -History of CVA    PLAN: Thank you for asking us to see Joy Bueno, I recommend the following:     -Monitor her CBC with differential, CRP, LDH, and ferritin daily for now. CRP has improved    -she is not a candidate for Remdesivir due to ESRD    -if she clinically worsens or inflammatory markers are uptrending that we could consider Decadron 6mg PO Daily x 10 days or until hypoxia improved. The  downside to this would be that it would be more difficult to control her glucoses in the setting of her diabetes.    -If she clinically declines then we could consider Convalescent plasma but she is not currently a candidate.    -continue to monitor off antibiotics    I am ok with her discharge soon as long as she remains clinically stable.    I have left our clinic number in discharge paperwork and she can call if she is more short of breath or having any fever >101F.    I will be off until 7/7/20. Please page one of my on call partners with any urgent issues    Jesus Olivo MD  7/3/2020

## 2020-07-04 LAB
BACTERIA SPEC AEROBE CULT: NORMAL
GLUCOSE BLDC GLUCOMTR-MCNC: 138 MG/DL (ref 70–130)
GLUCOSE BLDC GLUCOMTR-MCNC: 161 MG/DL (ref 70–130)
GLUCOSE BLDC GLUCOMTR-MCNC: 175 MG/DL (ref 70–130)
GLUCOSE BLDC GLUCOMTR-MCNC: 193 MG/DL (ref 70–130)
INR PPP: 1.81 (ref 0.85–1.16)
PROTHROMBIN TIME: 20.7 SECONDS (ref 11.5–14)

## 2020-07-04 PROCEDURE — 82962 GLUCOSE BLOOD TEST: CPT

## 2020-07-04 PROCEDURE — 85610 PROTHROMBIN TIME: CPT

## 2020-07-04 PROCEDURE — 99232 SBSQ HOSP IP/OBS MODERATE 35: CPT | Performed by: INTERNAL MEDICINE

## 2020-07-04 PROCEDURE — 63710000001 INSULIN LISPRO (HUMAN) PER 5 UNITS: Performed by: INTERNAL MEDICINE

## 2020-07-04 PROCEDURE — 94799 UNLISTED PULMONARY SVC/PX: CPT

## 2020-07-04 RX ADMIN — SEVELAMER CARBONATE 0.8 G: 800 POWDER, FOR SUSPENSION ORAL at 09:07

## 2020-07-04 RX ADMIN — NYSTATIN: 100000 POWDER TOPICAL at 09:07

## 2020-07-04 RX ADMIN — INSULIN LISPRO 2 UNITS: 100 INJECTION, SOLUTION INTRAVENOUS; SUBCUTANEOUS at 17:32

## 2020-07-04 RX ADMIN — PANTOPRAZOLE SODIUM 40 MG: 40 TABLET, DELAYED RELEASE ORAL at 06:43

## 2020-07-04 RX ADMIN — ATORVASTATIN CALCIUM 80 MG: 40 TABLET, FILM COATED ORAL at 22:38

## 2020-07-04 RX ADMIN — ASPIRIN 81 MG CHEWABLE TABLET 81 MG: 81 TABLET CHEWABLE at 09:07

## 2020-07-04 RX ADMIN — HYDROCORTISONE: 25 CREAM TOPICAL at 09:07

## 2020-07-04 RX ADMIN — HYDROCORTISONE: 25 CREAM TOPICAL at 22:38

## 2020-07-04 RX ADMIN — BUDESONIDE AND FORMOTEROL FUMARATE DIHYDRATE 2 PUFF: 160; 4.5 AEROSOL RESPIRATORY (INHALATION) at 19:32

## 2020-07-04 RX ADMIN — INSULIN LISPRO 2 UNITS: 100 INJECTION, SOLUTION INTRAVENOUS; SUBCUTANEOUS at 13:39

## 2020-07-04 RX ADMIN — Medication 250 MG: at 09:07

## 2020-07-04 RX ADMIN — SEVELAMER CARBONATE 0.8 G: 800 POWDER, FOR SUSPENSION ORAL at 17:32

## 2020-07-04 RX ADMIN — SODIUM CHLORIDE, PRESERVATIVE FREE 10 ML: 5 INJECTION INTRAVENOUS at 09:08

## 2020-07-04 RX ADMIN — CARVEDILOL 12.5 MG: 12.5 TABLET, FILM COATED ORAL at 17:32

## 2020-07-04 RX ADMIN — SERTRALINE 50 MG: 50 TABLET, FILM COATED ORAL at 09:07

## 2020-07-04 RX ADMIN — WARFARIN SODIUM 5 MG: 5 TABLET ORAL at 17:32

## 2020-07-04 RX ADMIN — ISOSORBIDE MONONITRATE 30 MG: 30 TABLET, EXTENDED RELEASE ORAL at 09:07

## 2020-07-04 RX ADMIN — HYDROCODONE BITARTRATE AND ACETAMINOPHEN 1 TABLET: 5; 325 TABLET ORAL at 22:38

## 2020-07-04 RX ADMIN — NYSTATIN: 100000 POWDER TOPICAL at 22:38

## 2020-07-04 RX ADMIN — CALCITRIOL CAPSULES 0.25 MCG 0.25 MCG: 0.25 CAPSULE ORAL at 09:07

## 2020-07-04 RX ADMIN — CARVEDILOL 12.5 MG: 12.5 TABLET, FILM COATED ORAL at 09:07

## 2020-07-04 RX ADMIN — SEVELAMER CARBONATE 0.8 G: 800 POWDER, FOR SUSPENSION ORAL at 12:11

## 2020-07-04 NOTE — PROGRESS NOTES
"Pharmacy Consult - Warfarin    Joy Bueno is a 70 y.o. female on warfarin therapy.    Height - 162.6 cm (64\")  Weight - (!) 141 kg (311 lb 8 oz)    Consulting Provider: Nanda Gonzalez DO  Indication: Hx of DVT  Goal INR: 2-3  Home Regimen: warfarin 6mg daily    Bridge Therapy: none    Drug-Drug Interactions with current regimen:  Pantoprazole - may increase INR    Warfarin Dosing During Admission:    Date  6/29 6/30 7/1 7/2 7/3 7/4      INR  3.97 4.64 3.65 3.6 2.18 1.81      Dose  0 0 0 0  5mg (5mg)        Education Provided: Warfarin education provided on 7/3/20 in writing.    Discharge Follow up:   Following Provider - [facility]   Follow up time range or appointment - 2-3 days following discharge    Labs:    Results from last 7 days   Lab Units 07/04/20  0955 07/03/20  1146 07/02/20  1032 07/02/20  0522 07/01/20  1406 07/01/20  0254 06/30/20  1615 06/30/20  0930 06/30/20  0352 06/30/20  0048 06/29/20  1711 06/29/20  0530   INR  1.81* 2.18* 3.60*  --  3.65*  --   --  4.64*  --   --   --  3.97*   HEMOGLOBIN g/dL  --   --   --  7.9* 7.7* 7.9* 7.7*  --  7.8*  7.8* 8.1* 7.9* 10.8*   HEMATOCRIT %  --   --   --  29.6* 26.6* 25.8* 25.5*  --  26.5*  26.5* 29.6* 26.6* 36.4     Results from last 7 days   Lab Units 07/03/20  1146 07/02/20  1032 07/01/20  1406  06/29/20  0530   SODIUM mmol/L 136 144 140   < > 140   POTASSIUM mmol/L 3.5 4.1 3.7   < > 4.3   CHLORIDE mmol/L 96* 103 101   < > 98   CO2 mmol/L 26.0 29.0 29.0   < > 27.0   BUN mg/dL 23 33* 25*   < > 88*   CREATININE mg/dL 3.06* 4.34* 2.85*   < > 7.17*   CALCIUM mg/dL 8.6 9.0 8.7   < > 7.7*   BILIRUBIN mg/dL  --   --   --   --  0.5   ALK PHOS U/L  --   --   --   --  94   ALT (SGPT) U/L  --   --   --   --  10   AST (SGOT) U/L  --   --   --   --  15   GLUCOSE mg/dL 152* 164* 131*   < > 161*    < > = values in this interval not displayed.     Current dietary intake: ~50-75% of documented meals  Diet Order   Procedures    Diet Regular; Cardiac, " Consistent Carbohydrate, Renal     Assessment/Plan:  Warfarin dosing for hx DVT  Goal INR: 2-3  7/4 INR - 1.81  7/2 H/H - 7.9/29.6    Will continue warfarin 5mg daily for now  Monitor s/s bleeding, dietary intake, clinical status and drug-drug interactions  Follow daily INR and adjust dose accordingly    Usama Sharma, PharmD, BCPS  7/4/2020  13:46

## 2020-07-04 NOTE — PROGRESS NOTES
[facility] Medicine Services  PROGRESS NOTE    Patient Name: Joy Bueno  : 1950  MRN: [id number]    Date of Admission: 2020  Primary Care Physician: Goldy Dior MD    Subjective   Subjective     CC:  Follow-up shortness of breath and COVID-19    HPI:  Improved shortness of breath.  No new symptoms.  Agrees with plan.    Review of Systems  No current fevers or chills  No current dysuria or hematuria  No current nausea, vomiting, or diarrhea  No current chest pain or palpitations      Objective   Objective     Vital Signs:   Temp:  [96.5 °F (35.8 °C)-98.1 °F (36.7 °C)] 98.1 °F (36.7 °C)  Heart Rate:  [71-84] 71  Resp:  [16-20] 16  BP: (116-178)/() 116/55        Physical Exam:  Constitutional:Awake, alert  HENT: NCAT, mucous membranes moist, neck supple  Respiratory: Minimal cough, no audible wheezes, currently on room air, respiratory rate normal  Cardiovascular: RRR, normal radial pulses  Gastrointestinal: Positive bowel sounds, soft, nontender, nondistended  Musculoskeletal: Elderly and chronically debilitated in appearance, severe morbid obesity, some lower extremity edema, BMI 53  Psychiatric: Appropriate affect, cooperative, conversational  Neurologic: No slurred speech or facial droop, follows commands  Skin: No rashes or jaundice, warm      Results Reviewed:  Results from last 7 days   Lab Units 20  1146 20  1032 20  0522 20  1406 20  0254  20  0352  20  0530   WBC 10*3/mm3  --   --  6.46  --   --   --  6.72  --  7.15   HEMOGLOBIN g/dL  --   --  7.9* 7.7* 7.9*   < > 7.8*  7.8*   < > 10.8*   HEMATOCRIT %  --   --  29.6* 26.6* 25.8*   < > 26.5*  26.5*   < > 36.4   PLATELETS 10*3/mm3  --   --  191  --   --   --  217  --  251   INR  2.18* 3.60*  --  3.65*  --    < >  --   --  3.97*   PROCALCITONIN ng/mL  --   --   --   --   --   --   --   --  0.32*  0.34*    < > = values in this interval not displayed.      Results from last 7 days   Lab Units 07/03/20  1146 07/02/20  1032 07/01/20  1406  06/30/20  0048 06/29/20  1711 06/29/20  1137 06/29/20  0530   SODIUM mmol/L 136 144 140   < >  --   --   --  140   POTASSIUM mmol/L 3.5 4.1 3.7   < >  --   --   --  4.3   CHLORIDE mmol/L 96* 103 101   < >  --   --   --  98   CO2 mmol/L 26.0 29.0 29.0   < >  --   --   --  27.0   BUN mg/dL 23 33* 25*   < >  --   --   --  88*   CREATININE mg/dL 3.06* 4.34* 2.85*   < >  --   --   --  7.17*   GLUCOSE mg/dL 152* 164* 131*   < >  --   --   --  161*   CALCIUM mg/dL 8.6 9.0 8.7   < >  --   --   --  7.7*   ALT (SGPT) U/L  --   --   --   --   --   --   --  10   AST (SGOT) U/L  --   --   --   --   --   --   --  15   TROPONIN T ng/mL  --   --   --   --  0.108* 0.103* 0.108* 0.127*   PROBNP pg/mL  --   --   --   --   --   --   --  14,663.0*    < > = values in this interval not displayed.     Estimated Creatinine Clearance: 24.4 mL/min (A) (by C-G formula based on SCr of 3.06 mg/dL (H)).    Microbiology Results Abnormal     Procedure Component Value - Date/Time    Blood Culture - Blood, Arm, Left [[id number]] Collected:  06/29/20 0650    Lab Status:  Preliminary result Specimen:  Blood from Arm, Left Updated:  07/03/20 0815     Blood Culture No growth at 4 days    Blood Culture - Blood, Arm, Left [[id number]]  (Abnormal) Collected:  06/29/20 0630    Lab Status:  Edited Result - FINAL Specimen:  Blood from Arm, Left Updated:  07/03/20 0608     Blood Culture Staphylococcus, coagulase negative     Comment: Probable contaminant requires clinical correlation, susceptibility not performed unless requested by physician.          Isolated from Aerobic Bottle     Gram Stain Aerobic Bottle Gram positive cocci in pairs and clusters      Anaerobic Bottle Gram positive cocci in clusters     Comment: Appended report. These results have been appended to a previously final verified report.       Blood Culture ID, PCR - Blood, Arm, Left [[id number]]  (Abnormal)  Collected:  06/29/20 0630    Lab Status:  Final result Specimen:  Blood from Arm, Left Updated:  06/30/20 0918     BCID, PCR Staphylococcus spp, not aureus. Identification by BCID PCR.    COVID-19,[facility], NP SWAB IN TRANSPORT MEDIA 8-12 HR TAT - Swab, Nasopharynx [[id number]]  (Abnormal) Collected:  06/29/20 0814    Lab Status:  Final result Specimen:  Swab from Nasopharynx Updated:  06/29/20 1345     COVID19 Detected    Narrative:       Fact sheet for providers: https://www.fda.gov/media/219685/download     Fact sheet for patients: https://www.fda.gov/media/041980/download    Respiratory Panel, PCR - Swab, Nasopharynx [[id number]]  (Normal) Collected:  06/29/20 0814    Lab Status:  Final result Specimen:  Swab from Nasopharynx Updated:  06/29/20 1029     ADENOVIRUS, PCR Not Detected     Coronavirus 229E Not Detected     Coronavirus HKU1 Not Detected     Coronavirus NL63 Not Detected     Coronavirus OC43 Not Detected     Human Metapneumovirus Not Detected     Human Rhinovirus/Enterovirus Not Detected     Influenza B PCR Not Detected     Parainfluenza Virus 1 Not Detected     Parainfluenza Virus 2 Not Detected     Parainfluenza Virus 3 Not Detected     Parainfluenza Virus 4 Not Detected     Bordetella pertussis pcr Not Detected     Influenza A H1 2009 PCR Not Detected     Chlamydophila pneumoniae PCR Not Detected     Mycoplasma pneumo by PCR Not Detected     Influenza A PCR Not Detected     Influenza A H3 Not Detected     Influenza A H1 Not Detected     RSV, PCR Not Detected     Bordetella parapertussis PCR Not Detected    Narrative:       The coronavirus on the RVP is NOT COVID-19 and is NOT indicative of infection with COVID-19.           Imaging Results (Last 24 Hours)     ** No results found for the last 24 hours. **          Results for orders placed during the hospital encounter of 09/29/19   Adult Transthoracic Echo Complete W/ Cont if Necessary Per Protocol    Narrative · Estimated EF = 60%.  · Left  ventricular systolic function is normal.          I have reviewed the medications:  Scheduled Meds:    aspirin 81 mg Oral Daily   atorvastatin 80 mg Oral Nightly   budesonide-formoterol 2 puff Inhalation BID - RT   calcitriol 0.25 mcg Oral Daily   carvedilol 12.5 mg Oral BID With Meals   docusate sodium 100 mg Oral BID   Hydrocortisone (Perianal)  Rectal BID   insulin lispro 0-7 Units Subcutaneous TID AC   isosorbide mononitrate 30 mg Oral Daily   nystatin  Topical Q12H   pantoprazole 40 mg Oral Q AM   saccharomyces boulardii 250 mg Oral Daily   sertraline 50 mg Oral Daily   sevelamer 800 mg Oral TID With Meals   sodium chloride 10 mL Intravenous Q12H   warfarin 5 mg Oral Daily     Continuous Infusions:    Pharmacy Consult      PRN Meds:.•  acetaminophen  •  dextrose  •  dextrose  •  glucagon (human recombinant)  •  heparin (porcine)  •  HYDROcodone-acetaminophen  •  ondansetron  •  Pharmacy Consult  •  polyethylene glycol  •  sodium chloride    Assessment/Plan   Assessment & Plan     Active Hospital Problems    Diagnosis  POA   • **Pneumonia due to COVID-19 virus [U07.1, J12.89]  Yes   • Acute blood loss anemia [D62]  Yes   • Pneumonia [J18.9]  Yes   • Hemorrhoids [K64.9]  Yes   • Supratherapeutic INR [R79.1]  Yes   • Volume overload [E87.70]  Yes   • Anticoagulated on Coumadin [Z79.01]  Not Applicable   • History of deep vein thrombosis (DVT) of brachial vein of left upper extremity (CMS/[facility]) [I82.622]  Yes   • Chronic diastolic heart failure (CMS/[facility]) [I50.32]  Yes   • ESRD (end stage renal disease) (CMS/[facility]) [N18.6]  Yes   • Acute respiratory failure with hypoxia (CMS/[facility]) [J96.01]  Yes   • Essential hypertension [I10]  Yes   • Morbid obesity (CMS/[facility]) [E66.01]  Yes      Resolved Hospital Problems    Diagnosis Date Resolved POA   • Epistaxis [R04.0] 07/01/2020 Yes        Brief Hospital Course to date:  Joy Bueno is a 70 y.o. female who is a nursing home resident of [facility] with past  medical history is significant for severe morbid obesity, end-stage renal disease on hemodialysis Monday Wednesday Friday, coronary artery disease, diabetes, chronic anticoagulation for history of DVT, stroke, obstructive sleep apnea, and chronic hemorrhoids and anal fissure who presents to [facility] with complaint of hemoptysis and headache.  She was found to have acute respiratory failure with hypoxia, volume overload secondary to multiple missed dialysis sessions, and was positive for COVID-19.    Discussion/plan: INR dropped significantly yesterday.  Warfarin restarted.  Plan to follow-up INR today and adjust as needed with the assistance of pharmacy.  Hold steroids and convalescent plasma.  Not felt to be beneficial as patient currently on room air.  Continue to encourage nighttime BiPAP.  Monitor glucose.  No further epistaxis or hemoptysis.  Transfer back to facility per case management as scheduled for Monday.   Repeat labs tomorrow.    Acute respiratory failure with hypoxia and hypercapnia  COVID-19 positive with pneumonia  Volume overload secondary to missed dialysis  Obstructive sleep apnea  Possible obesity hypoventilation syndrome  CT chest notable for findings consistent with pneumonia and volume overload.  Consulted infectious disease to assist with COVID management  Probiotic  Discontinued antibiotics  BiPAP for hypercapnia intermittently and with sleep.    Epistaxis causing hemoptysis with acute blood loss anemia  Supratherapeutic INR  Bleeding stopped, suspect epistaxis causing hemoptysis with patient coughing up blood.  Significant blood loss noted with epistaxis.  No GI bleed noted.  Rhino Rocket has been removed.  Monitor for further episodes and monitor anemia.  Transfuse if needed.    Fecal occult positives, hemorrhoids, anemia of chronic kidney disease and chronic disease:  Hemoglobin appears to be stabilizing.  Transfuse as needed.  No further bleeding noted.  Continue to  monitor.    History of DVT on chronic coagulation with warfarin:  Dosing per pharmacy.  Hold initially due to supratherapeutic INR.    Essential hypertension:  Carvedilol dose decreased.  Hold as needed.    Hyperlipidemia: Stable.  Statin.    Diabetes: Monitor glucose.  Correction insulin.    End-stage renal disease on hemodialysis: Nephrology following for dialysis.  Patient counseled on avoiding missed dialysis sessions    DVT Prophylaxis: Anticoagulation    Discharge planning: Pending improvement.    CODE STATUS:   Code Status and Medical Interventions:   Ordered at: 06/29/20 0804     Level Of Support Discussed With:    Patient     Code Status:    CPR     Medical Interventions (Level of Support Prior to Arrest):    Full         Electronically signed by Trevon Briseno MD, 07/04/20, 07:04.

## 2020-07-04 NOTE — PLAN OF CARE
Problem: Patient Care Overview  Goal: Plan of Care Review  Outcome: Ongoing (interventions implemented as appropriate)  Flowsheets (Taken 7/4/2020 1530)  Progress: no change  Plan of Care Reviewed With: patient  Outcome Summary: Pt VSS. No changes today. JACK Johns RN.

## 2020-07-05 LAB
ANION GAP SERPL CALCULATED.3IONS-SCNC: 17 MMOL/L (ref 5–15)
BUN SERPL-MCNC: 60 MG/DL (ref 8–23)
BUN/CREAT SERPL: 10.4 (ref 7–25)
CALCIUM SPEC-SCNC: 8.5 MG/DL (ref 8.6–10.5)
CHLORIDE SERPL-SCNC: 96 MMOL/L (ref 98–107)
CO2 SERPL-SCNC: 24 MMOL/L (ref 22–29)
CREAT SERPL-MCNC: 5.78 MG/DL (ref 0.57–1)
GFR SERPL CREATININE-BSD FRML MDRD: 9 ML/MIN/1.73
GFR SERPL CREATININE-BSD FRML MDRD: ABNORMAL ML/MIN/{1.73_M2}
GLUCOSE BLDC GLUCOMTR-MCNC: 126 MG/DL (ref 70–130)
GLUCOSE BLDC GLUCOMTR-MCNC: 157 MG/DL (ref 70–130)
GLUCOSE BLDC GLUCOMTR-MCNC: 173 MG/DL (ref 70–130)
GLUCOSE BLDC GLUCOMTR-MCNC: 178 MG/DL (ref 70–130)
GLUCOSE BLDC GLUCOMTR-MCNC: 191 MG/DL (ref 70–130)
GLUCOSE SERPL-MCNC: 200 MG/DL (ref 65–99)
INR PPP: 2.04 (ref 0.85–1.16)
POTASSIUM SERPL-SCNC: 4.9 MMOL/L (ref 3.5–5.2)
PROTHROMBIN TIME: 22.7 SECONDS (ref 11.5–14)
SODIUM SERPL-SCNC: 137 MMOL/L (ref 136–145)

## 2020-07-05 PROCEDURE — 85610 PROTHROMBIN TIME: CPT

## 2020-07-05 PROCEDURE — 94799 UNLISTED PULMONARY SVC/PX: CPT

## 2020-07-05 PROCEDURE — 25010000002 ONDANSETRON PER 1 MG: Performed by: INTERNAL MEDICINE

## 2020-07-05 PROCEDURE — 99232 SBSQ HOSP IP/OBS MODERATE 35: CPT | Performed by: INTERNAL MEDICINE

## 2020-07-05 PROCEDURE — 80048 BASIC METABOLIC PNL TOTAL CA: CPT | Performed by: INTERNAL MEDICINE

## 2020-07-05 PROCEDURE — 82962 GLUCOSE BLOOD TEST: CPT

## 2020-07-05 PROCEDURE — 63710000001 INSULIN LISPRO (HUMAN) PER 5 UNITS: Performed by: INTERNAL MEDICINE

## 2020-07-05 RX ORDER — WARFARIN SODIUM 4 MG/1
4 TABLET ORAL
Status: DISCONTINUED | OUTPATIENT
Start: 2020-07-05 | End: 2020-07-07

## 2020-07-05 RX ADMIN — Medication 250 MG: at 09:36

## 2020-07-05 RX ADMIN — INSULIN LISPRO 2 UNITS: 100 INJECTION, SOLUTION INTRAVENOUS; SUBCUTANEOUS at 09:43

## 2020-07-05 RX ADMIN — NYSTATIN: 100000 POWDER TOPICAL at 09:35

## 2020-07-05 RX ADMIN — HYDROCODONE BITARTRATE AND ACETAMINOPHEN 1 TABLET: 5; 325 TABLET ORAL at 21:02

## 2020-07-05 RX ADMIN — WARFARIN SODIUM 4 MG: 4 TABLET ORAL at 17:34

## 2020-07-05 RX ADMIN — SODIUM CHLORIDE, PRESERVATIVE FREE 10 ML: 5 INJECTION INTRAVENOUS at 09:35

## 2020-07-05 RX ADMIN — SODIUM CHLORIDE, PRESERVATIVE FREE 10 ML: 5 INJECTION INTRAVENOUS at 21:02

## 2020-07-05 RX ADMIN — ASPIRIN 81 MG CHEWABLE TABLET 81 MG: 81 TABLET CHEWABLE at 09:36

## 2020-07-05 RX ADMIN — DOCUSATE SODIUM 100 MG: 100 CAPSULE, LIQUID FILLED ORAL at 09:36

## 2020-07-05 RX ADMIN — DOCUSATE SODIUM 100 MG: 100 CAPSULE, LIQUID FILLED ORAL at 21:02

## 2020-07-05 RX ADMIN — HYDROCORTISONE: 25 CREAM TOPICAL at 21:01

## 2020-07-05 RX ADMIN — SEVELAMER CARBONATE 0.8 G: 800 POWDER, FOR SUSPENSION ORAL at 12:52

## 2020-07-05 RX ADMIN — ONDANSETRON 4 MG: 2 INJECTION INTRAMUSCULAR; INTRAVENOUS at 07:13

## 2020-07-05 RX ADMIN — SERTRALINE 50 MG: 50 TABLET, FILM COATED ORAL at 09:36

## 2020-07-05 RX ADMIN — SEVELAMER CARBONATE 0.8 G: 800 POWDER, FOR SUSPENSION ORAL at 17:34

## 2020-07-05 RX ADMIN — NYSTATIN: 100000 POWDER TOPICAL at 21:01

## 2020-07-05 RX ADMIN — BUDESONIDE AND FORMOTEROL FUMARATE DIHYDRATE 2 PUFF: 160; 4.5 AEROSOL RESPIRATORY (INHALATION) at 20:10

## 2020-07-05 RX ADMIN — ISOSORBIDE MONONITRATE 30 MG: 30 TABLET, EXTENDED RELEASE ORAL at 09:36

## 2020-07-05 RX ADMIN — CALCITRIOL CAPSULES 0.25 MCG 0.25 MCG: 0.25 CAPSULE ORAL at 09:36

## 2020-07-05 RX ADMIN — BUDESONIDE AND FORMOTEROL FUMARATE DIHYDRATE 2 PUFF: 160; 4.5 AEROSOL RESPIRATORY (INHALATION) at 08:24

## 2020-07-05 RX ADMIN — ATORVASTATIN CALCIUM 80 MG: 40 TABLET, FILM COATED ORAL at 21:01

## 2020-07-05 RX ADMIN — ACETAMINOPHEN 650 MG: 325 TABLET, FILM COATED ORAL at 16:57

## 2020-07-05 RX ADMIN — HYDROCORTISONE: 25 CREAM TOPICAL at 09:35

## 2020-07-05 RX ADMIN — INSULIN LISPRO 2 UNITS: 100 INJECTION, SOLUTION INTRAVENOUS; SUBCUTANEOUS at 12:52

## 2020-07-05 NOTE — PROGRESS NOTES
[facility] Medicine Services  PROGRESS NOTE    Patient Name: Joy Bueno  : 1950  MRN: [id number]    Date of Admission: 2020  Primary Care Physician: Goldy Dior MD    Subjective   Subjective     CC:  Follow-up shortness of breath and COVID-19    HPI:  No new complaints.  No new nursing issues reported.    Review of Systems  No current fevers or chills  No current dysuria or hematuria  No current nausea, vomiting, or diarrhea  No current chest pain or palpitations      Objective   Objective     Vital Signs:   Temp:  [94.4 °F (34.7 °C)-99.6 °F (37.6 °C)] 97.6 °F (36.4 °C)  Heart Rate:  [71-78] 75  Resp:  [12-18] 18  BP: (100-128)/(51-63) 100/53        Physical Exam:  Constitutional:Awake, alert  HENT: NCAT, mucous membranes moist, neck supple  Respiratory: Minimal cough, currently on room air, respiratory rate normal  Cardiovascular: RRR, normal radial pulses  Gastrointestinal: Positive bowel sounds, soft, nontender, nondistended  Musculoskeletal: Elderly and chronically debilitated in appearance, severe morbid obesity, some lower extremity edema, BMI 53  Psychiatric: Appropriate affect, cooperative, conversational  Neurologic: No slurred speech or facial droop, follows commands  Skin: No rashes or jaundice, warm      Results Reviewed:  Results from last 7 days   Lab Units 20  0525 20  0955 20  1146  20  0522 20  1406 20  0254  20  0352  20  0530   WBC 10*3/mm3  --   --   --   --  6.46  --   --   --  6.72  --  7.15   HEMOGLOBIN g/dL  --   --   --   --  7.9* 7.7* 7.9*   < > 7.8*  7.8*   < > 10.8*   HEMATOCRIT %  --   --   --   --  29.6* 26.6* 25.8*   < > 26.5*  26.5*   < > 36.4   PLATELETS 10*3/mm3  --   --   --   --  191  --   --   --  217  --  251   INR  2.04* 1.81* 2.18*   < >  --  3.65*  --    < >  --   --  3.97*   PROCALCITONIN ng/mL  --   --   --   --   --   --   --   --   --   --  0.32*  0.34*    < > = values  in this interval not displayed.     Results from last 7 days   Lab Units 07/05/20  0525 07/03/20  1146 07/02/20  1032  06/30/20  0048 06/29/20  1711 06/29/20  1137 06/29/20  0530   SODIUM mmol/L 137 136 144   < >  --   --   --  140   POTASSIUM mmol/L 4.9 3.5 4.1   < >  --   --   --  4.3   CHLORIDE mmol/L 96* 96* 103   < >  --   --   --  98   CO2 mmol/L 24.0 26.0 29.0   < >  --   --   --  27.0   BUN mg/dL 60* 23 33*   < >  --   --   --  88*   CREATININE mg/dL 5.78* 3.06* 4.34*   < >  --   --   --  7.17*   GLUCOSE mg/dL 200* 152* 164*   < >  --   --   --  161*   CALCIUM mg/dL 8.5* 8.6 9.0   < >  --   --   --  7.7*   ALT (SGPT) U/L  --   --   --   --   --   --   --  10   AST (SGOT) U/L  --   --   --   --   --   --   --  15   TROPONIN T ng/mL  --   --   --   --  0.108* 0.103* 0.108* 0.127*   PROBNP pg/mL  --   --   --   --   --   --   --  14,663.0*    < > = values in this interval not displayed.     Estimated Creatinine Clearance: 12.9 mL/min (A) (by C-G formula based on SCr of 5.78 mg/dL (H)).    Microbiology Results Abnormal     Procedure Component Value - Date/Time    Blood Culture - Blood, Arm, Left [[id number]] Collected:  06/29/20 0650    Lab Status:  Final result Specimen:  Blood from Arm, Left Updated:  07/04/20 0815     Blood Culture No growth at 5 days    Blood Culture - Blood, Arm, Left [[id number]]  (Abnormal) Collected:  06/29/20 0630    Lab Status:  Edited Result - FINAL Specimen:  Blood from Arm, Left Updated:  07/03/20 0608     Blood Culture Staphylococcus, coagulase negative     Comment: Probable contaminant requires clinical correlation, susceptibility not performed unless requested by physician.          Isolated from Aerobic Bottle     Gram Stain Aerobic Bottle Gram positive cocci in pairs and clusters      Anaerobic Bottle Gram positive cocci in clusters     Comment: Appended report. These results have been appended to a previously final verified report.       Blood Culture ID, PCR - Blood, Arm, Left  [[id number]]  (Abnormal) Collected:  06/29/20 0630    Lab Status:  Final result Specimen:  Blood from Arm, Left Updated:  06/30/20 0918     BCID, PCR Staphylococcus spp, not aureus. Identification by BCID PCR.    COVID-19,[facility], NP SWAB IN TRANSPORT MEDIA 8-12 HR TAT - Swab, Nasopharynx [[id number]]  (Abnormal) Collected:  06/29/20 0814    Lab Status:  Final result Specimen:  Swab from Nasopharynx Updated:  06/29/20 1345     COVID19 Detected    Narrative:       Fact sheet for providers: https://www.fda.gov/media/129623/download     Fact sheet for patients: https://www.fda.gov/media/418023/download    Respiratory Panel, PCR - Swab, Nasopharynx [[id number]]  (Normal) Collected:  06/29/20 0814    Lab Status:  Final result Specimen:  Swab from Nasopharynx Updated:  06/29/20 1029     ADENOVIRUS, PCR Not Detected     Coronavirus 229E Not Detected     Coronavirus HKU1 Not Detected     Coronavirus NL63 Not Detected     Coronavirus OC43 Not Detected     Human Metapneumovirus Not Detected     Human Rhinovirus/Enterovirus Not Detected     Influenza B PCR Not Detected     Parainfluenza Virus 1 Not Detected     Parainfluenza Virus 2 Not Detected     Parainfluenza Virus 3 Not Detected     Parainfluenza Virus 4 Not Detected     Bordetella pertussis pcr Not Detected     Influenza A H1 2009 PCR Not Detected     Chlamydophila pneumoniae PCR Not Detected     Mycoplasma pneumo by PCR Not Detected     Influenza A PCR Not Detected     Influenza A H3 Not Detected     Influenza A H1 Not Detected     RSV, PCR Not Detected     Bordetella parapertussis PCR Not Detected    Narrative:       The coronavirus on the RVP is NOT COVID-19 and is NOT indicative of infection with COVID-19.           Imaging Results (Last 24 Hours)     ** No results found for the last 24 hours. **          Results for orders placed during the hospital encounter of 09/29/19   Adult Transthoracic Echo Complete W/ Cont if Necessary Per Protocol    Narrative ·  Estimated EF = 60%.  · Left ventricular systolic function is normal.          I have reviewed the medications:  Scheduled Meds:    aspirin 81 mg Oral Daily   atorvastatin 80 mg Oral Nightly   budesonide-formoterol 2 puff Inhalation BID - RT   calcitriol 0.25 mcg Oral Daily   carvedilol 12.5 mg Oral BID With Meals   docusate sodium 100 mg Oral BID   Hydrocortisone (Perianal)  Rectal BID   insulin lispro 0-7 Units Subcutaneous TID AC   isosorbide mononitrate 30 mg Oral Daily   nystatin  Topical Q12H   pantoprazole 40 mg Oral Q AM   saccharomyces boulardii 250 mg Oral Daily   sertraline 50 mg Oral Daily   sevelamer 800 mg Oral TID With Meals   sodium chloride 10 mL Intravenous Q12H   warfarin 5 mg Oral Daily     Continuous Infusions:    Pharmacy Consult      PRN Meds:.•  acetaminophen  •  dextrose  •  dextrose  •  glucagon (human recombinant)  •  heparin (porcine)  •  HYDROcodone-acetaminophen  •  ondansetron  •  Pharmacy Consult  •  polyethylene glycol  •  sodium chloride    Assessment/Plan   Assessment & Plan     Active Hospital Problems    Diagnosis  POA   • **Pneumonia due to COVID-19 virus [U07.1, J12.89]  Yes   • Acute blood loss anemia [D62]  Yes   • Pneumonia [J18.9]  Yes   • Hemorrhoids [K64.9]  Yes   • Supratherapeutic INR [R79.1]  Yes   • Volume overload [E87.70]  Yes   • Anticoagulated on Coumadin [Z79.01]  Not Applicable   • History of deep vein thrombosis (DVT) of brachial vein of left upper extremity (CMS/[facility]) [I82.622]  Yes   • Chronic diastolic heart failure (CMS/[facility]) [I50.32]  Yes   • ESRD (end stage renal disease) (CMS/[facility]) [N18.6]  Yes   • Acute respiratory failure with hypoxia (CMS/[facility]) [J96.01]  Yes   • Essential hypertension [I10]  Yes   • Morbid obesity (CMS/[facility]) [E66.01]  Yes      Resolved Hospital Problems    Diagnosis Date Resolved POA   • Epistaxis [R04.0] 07/01/2020 Yes        Brief Hospital Course to date:  Joy Bueno is a 70 y.o. female who is a nursing home resident of [place]  nursing facility with past medical history is significant for severe morbid obesity, end-stage renal disease on hemodialysis Monday Wednesday Friday, coronary artery disease, diabetes, chronic anticoagulation for history of DVT, stroke, obstructive sleep apnea, and chronic hemorrhoids and anal fissure who presents to [facility] with complaint of hemoptysis and headache.  She was found to have acute respiratory failure with hypoxia, volume overload secondary to multiple missed dialysis sessions, and was positive for COVID-19.    Discussion/plan: INR back to therapeutic today.  Case discussed with pharmacist.  Will hold off on any bridge if INR slightly low based on risk versus benefit assessment.  Continue plan with transfer when possible. Hold steroids and convalescent plasma.  Not felt to be beneficial as patient currently on room air.  Continue to encourage nighttime BiPAP.  Monitor glucose.  No further epistaxis or hemoptysis.  Transfer back to facility per case management as scheduled for Monday.   Repeat labs tomorrow.    Acute respiratory failure with hypoxia and hypercapnia  COVID-19 positive with pneumonia  Volume overload secondary to missed dialysis  Obstructive sleep apnea  Possible obesity hypoventilation syndrome  CT chest notable for findings consistent with pneumonia and volume overload.  Consulted infectious disease to assist with COVID management  Probiotic  Discontinued antibiotics  BiPAP for hypercapnia intermittently and with sleep.    Epistaxis causing hemoptysis with acute blood loss anemia  Supratherapeutic INR  Bleeding stopped, suspect epistaxis causing hemoptysis with patient coughing up blood.  Significant blood loss noted with epistaxis.  No GI bleed noted.  Rhino Rocket has been removed.  Monitor for further episodes and monitor anemia.  Transfuse if needed.    Fecal occult positives, hemorrhoids, anemia of chronic kidney disease and chronic disease:  Hemoglobin appears to be  stabilizing.  Transfuse as needed.  No further bleeding noted.  Continue to monitor.    History of DVT on chronic coagulation with warfarin:  Dosing per pharmacy.  Hold initially due to supratherapeutic INR.    Essential hypertension:  Carvedilol dose decreased.  Hold as needed.    Hyperlipidemia: Stable.  Statin.    Diabetes: Monitor glucose.  Correction insulin.    End-stage renal disease on hemodialysis: Nephrology following for dialysis.  Patient counseled on avoiding missed dialysis sessions    DVT Prophylaxis: Anticoagulation    Discharge planning: Pending improvement.    CODE STATUS:   Code Status and Medical Interventions:   Ordered at: 06/29/20 0804     Level Of Support Discussed With:    Patient     Code Status:    CPR     Medical Interventions (Level of Support Prior to Arrest):    Full         Electronically signed by Trevon Briseno MD, 07/05/20, 08:18.

## 2020-07-05 NOTE — PLAN OF CARE
Problem: Patient Care Overview  Goal: Plan of Care Review  Outcome: Ongoing (interventions implemented as appropriate)  Flowsheets (Taken 7/5/2020 2320)  Progress: no change  Plan of Care Reviewed With: patient  Outcome Summary: VSS. Oxygen saturation 99% on 2L NC.

## 2020-07-05 NOTE — PROGRESS NOTES
"   LOS: 6 days    Patient Care Team:  Golyd Dior MD as PCP - General (Internal Medicine)    Chief Complaint: Shortness of breath COVID-19 positive  Consulted for ESRD.  70-year-old female with ESRD on hemodialysis Monday Wednesday Friday, CAD, diabetes, history of DVT on Coumadin, history of CVA cerebellar, morbid obesity, hemorrhoids with anal fissures presented with more places and headache.  She has missed her dialysis earlier.  She was recently diagnosed with COVID-19   Subjective   No new events.    Comfortable in bed no obvious distress sleeping       Review of Systems:   Patient seen from the outside the last.  Comfortable, no new complaints    Objective     Vital Sign Min/Max for last 24 hours  Temp  Min: 96.4 °F (35.8 °C)  Max: 99.6 °F (37.6 °C)   BP  Min: 94/42  Max: 122/51   Pulse  Min: 73  Max: 78   Resp  Min: 16  Max: 18   SpO2  Min: 85 %  Max: 100 %   No data recorded   No data recorded     Flowsheet Rows      First Filed Value   Admission Height  162.6 cm (64\") Documented at 06/29/2020 0341   Admission Weight  (!) 147 kg (324 lb) Documented at 06/29/2020 0341          No intake/output data recorded.  No intake/output data recorded.    Physical Exam:  Due to COVID-19: Patient's examinations received from the staff RN and nursing assistant.   asymptomatic.  General Appearance: Laying comfortable in bed no obvious distress.  Neck: Supple as she is moving without any problem.  Lungs:Equal chest movement, nonlabored.  Heart: Regular  Abdomen: Obesity  Extremities: Positive edema according to the RN  Neuro: Moving all extremities.       WBC No results found for: WBC   HGB No results found for: HGB   HCT No results found for: HCT   Platlets No results found for: LABPLAT   MCV No results found for: MCV       Sodium Sodium   Date Value Ref Range Status   07/05/2020 137 136 - 145 mmol/L Final   07/03/2020 136 136 - 145 mmol/L Final      Potassium Potassium   Date Value Ref Range Status   07/05/2020 4.9 " 3.5 - 5.2 mmol/L Final   07/03/2020 3.5 3.5 - 5.2 mmol/L Final      Chloride Chloride   Date Value Ref Range Status   07/05/2020 96 (L) 98 - 107 mmol/L Final   07/03/2020 96 (L) 98 - 107 mmol/L Final      CO2 CO2   Date Value Ref Range Status   07/05/2020 24.0 22.0 - 29.0 mmol/L Final   07/03/2020 26.0 22.0 - 29.0 mmol/L Final      BUN BUN   Date Value Ref Range Status   07/05/2020 60 (H) 8 - 23 mg/dL Final   07/03/2020 23 8 - 23 mg/dL Final      Creatinine Creatinine   Date Value Ref Range Status   07/05/2020 5.78 (H) 0.57 - 1.00 mg/dL Final   07/03/2020 3.06 (H) 0.57 - 1.00 mg/dL Final      Calcium Calcium   Date Value Ref Range Status   07/05/2020 8.5 (L) 8.6 - 10.5 mg/dL Final   07/03/2020 8.6 8.6 - 10.5 mg/dL Final      PO4 No results found for: CAPO4   Albumin No results found for: ALBUMIN   Magnesium No results found for: MG   Uric Acid No results found for: URICACID        Results Review:     I reviewed the patient's new clinical results.      aspirin 81 mg Oral Daily   atorvastatin 80 mg Oral Nightly   budesonide-formoterol 2 puff Inhalation BID - RT   calcitriol 0.25 mcg Oral Daily   carvedilol 12.5 mg Oral BID With Meals   docusate sodium 100 mg Oral BID   Hydrocortisone (Perianal)  Rectal BID   insulin lispro 0-7 Units Subcutaneous TID AC   isosorbide mononitrate 30 mg Oral Daily   nystatin  Topical Q12H   pantoprazole 40 mg Oral Q AM   saccharomyces boulardii 250 mg Oral Daily   sertraline 50 mg Oral Daily   sevelamer 800 mg Oral TID With Meals   sodium chloride 10 mL Intravenous Q12H   warfarin 4 mg Oral Daily       Pharmacy Consult        Medication Review: As above    Assessment/Plan   1.  ESRD dialysis Monday Wednesday Friday.  2.  Blood pressure: Monitoring closely with current medications.  3.  BMD: On phosphate binders  4.  Hypoxia: Monitor closely.  5.  Anemia of chronic kidney disease.  6.  Acute respiratory failure with hypoxia COVID-19 positive.  7.  Epistaxis with supratherapeutic  INR.  Plan:   carvedilol 12.5 mg twice daily, monitor.  HD per F vale.  Orders written.  Transfuse at hb<7. Avoid KIRK in the setting of COVID-19 infection.   High risk patient with multiple medical problems    Irineo Latham MD  07/05/20  15:56

## 2020-07-05 NOTE — PROGRESS NOTES
"Pharmacy Consult - Warfarin    Joy Bueno is a 70 y.o. female on warfarin therapy.    Height - 162.6 cm (64\")  Weight - (!) 141 kg (311 lb 8 oz)    Consulting Provider: Nanda Gonzalez DO  Indication: Hx of DVT  Goal INR: 2-3  Home Regimen: warfarin 6mg daily    Bridge Therapy: none    Drug-Drug Interactions with current regimen:  Pantoprazole - may increase INR    Warfarin Dosing During Admission:    Date  6/29 6/30 7/1 7/2 7/3 7/4 7/5     INR  3.97 4.64 3.65 3.6 2.18 1.81 2.04     Dose  0 0 0 0  5mg 5mg (4mg)       Education Provided: Warfarin education provided on 7/3/20 in writing.    Discharge Follow up:   Following Provider - Resident herrera [facility]   Follow up time range or appointment - 2-3 days following discharge    Labs:    Results from last 7 days   Lab Units 07/05/20  0525 07/04/20  0955 07/03/20  1146 07/02/20  1032 07/02/20  0522 07/01/20  1406 07/01/20  0254 06/30/20  1615 06/30/20  0930 06/30/20  0352 06/30/20  0048 06/29/20  1711 06/29/20  0530   INR  2.04* 1.81* 2.18* 3.60*  --  3.65*  --   --  4.64*  --   --   --  3.97*   HEMOGLOBIN g/dL  --   --   --   --  7.9* 7.7* 7.9* 7.7*  --  7.8*  7.8* 8.1* 7.9* 10.8*   HEMATOCRIT %  --   --   --   --  29.6* 26.6* 25.8* 25.5*  --  26.5*  26.5* 29.6* 26.6* 36.4     Results from last 7 days   Lab Units 07/05/20  0525 07/03/20  1146 07/02/20  1032  06/29/20  0530   SODIUM mmol/L 137 136 144   < > 140   POTASSIUM mmol/L 4.9 3.5 4.1   < > 4.3   CHLORIDE mmol/L 96* 96* 103   < > 98   CO2 mmol/L 24.0 26.0 29.0   < > 27.0   BUN mg/dL 60* 23 33*   < > 88*   CREATININE mg/dL 5.78* 3.06* 4.34*   < > 7.17*   CALCIUM mg/dL 8.5* 8.6 9.0   < > 7.7*   BILIRUBIN mg/dL  --   --   --   --  0.5   ALK PHOS U/L  --   --   --   --  94   ALT (SGPT) U/L  --   --   --   --  10   AST (SGOT) U/L  --   --   --   --  15   GLUCOSE mg/dL 200* 152* 164*   < > 161*    < > = values in this interval not displayed.     Current dietary intake: ~% of documented meals  Diet " Order   Procedures    Diet Regular; Cardiac, Consistent Carbohydrate, Renal     Assessment/Plan:  Warfarin dosing for hx DVT  Goal INR: 2-3  7/5 INR - 2.04  7/2 H/H - 7.9/29.6    Will reduce dose to warfarin 4mg daily for now  Monitor s/s bleeding, dietary intake, clinical status and drug-drug interactions  Follow daily INR and adjust dose accordingly    Usama Sharma, PharmD, BCPS  7/5/2020  08:47

## 2020-07-06 ENCOUNTER — APPOINTMENT (OUTPATIENT)
Dept: NEPHROLOGY | Facility: HOSPITAL | Age: 70
End: 2020-07-06

## 2020-07-06 PROBLEM — R79.1 SUPRATHERAPEUTIC INR: Status: RESOLVED | Noted: 2019-07-31 | Resolved: 2020-07-06

## 2020-07-06 PROBLEM — E87.70 VOLUME OVERLOAD: Status: RESOLVED | Noted: 2019-04-06 | Resolved: 2020-07-06

## 2020-07-06 PROBLEM — J96.01 ACUTE RESPIRATORY FAILURE WITH HYPOXIA (HCC): Status: RESOLVED | Noted: 2017-09-02 | Resolved: 2020-07-06

## 2020-07-06 LAB
DEPRECATED RDW RBC AUTO: 60.8 FL (ref 37–54)
ERYTHROCYTE [DISTWIDTH] IN BLOOD BY AUTOMATED COUNT: 16.1 % (ref 12.3–15.4)
GLUCOSE BLDC GLUCOMTR-MCNC: 105 MG/DL (ref 70–130)
GLUCOSE BLDC GLUCOMTR-MCNC: 134 MG/DL (ref 70–130)
GLUCOSE BLDC GLUCOMTR-MCNC: 163 MG/DL (ref 70–130)
GLUCOSE BLDC GLUCOMTR-MCNC: 192 MG/DL (ref 70–130)
HCT VFR BLD AUTO: 25.8 % (ref 34–46.6)
HGB BLD-MCNC: 7.4 G/DL (ref 12–15.9)
INR PPP: 2.31 (ref 0.85–1.16)
MCH RBC QN AUTO: 29.2 PG (ref 26.6–33)
MCHC RBC AUTO-ENTMCNC: 28.7 G/DL (ref 31.5–35.7)
MCV RBC AUTO: 102 FL (ref 79–97)
PLATELET # BLD AUTO: 225 10*3/MM3 (ref 140–450)
PMV BLD AUTO: 10.6 FL (ref 6–12)
PROTHROMBIN TIME: 25.1 SECONDS (ref 11.5–14)
RBC # BLD AUTO: 2.53 10*6/MM3 (ref 3.77–5.28)
SARS-COV-2 N GENE NPH QL NAA+PROBE: DETECTED
WBC # BLD AUTO: 8.08 10*3/MM3 (ref 3.4–10.8)

## 2020-07-06 PROCEDURE — 25010000002 HEPARIN (PORCINE) PER 1000 UNITS: Performed by: INTERNAL MEDICINE

## 2020-07-06 PROCEDURE — 5A1D70Z PERFORMANCE OF URINARY FILTRATION, INTERMITTENT, LESS THAN 6 HOURS PER DAY: ICD-10-PCS | Performed by: INTERNAL MEDICINE

## 2020-07-06 PROCEDURE — 85610 PROTHROMBIN TIME: CPT

## 2020-07-06 PROCEDURE — 93005 ELECTROCARDIOGRAM TRACING: CPT | Performed by: INTERNAL MEDICINE

## 2020-07-06 PROCEDURE — 25010000002 ONDANSETRON PER 1 MG: Performed by: INTERNAL MEDICINE

## 2020-07-06 PROCEDURE — 99239 HOSP IP/OBS DSCHRG MGMT >30: CPT | Performed by: INTERNAL MEDICINE

## 2020-07-06 PROCEDURE — 63710000001 INSULIN LISPRO (HUMAN) PER 5 UNITS: Performed by: INTERNAL MEDICINE

## 2020-07-06 PROCEDURE — 85027 COMPLETE CBC AUTOMATED: CPT | Performed by: INTERNAL MEDICINE

## 2020-07-06 PROCEDURE — 87635 SARS-COV-2 COVID-19 AMP PRB: CPT | Performed by: INTERNAL MEDICINE

## 2020-07-06 PROCEDURE — 25010000002 ALBUMIN HUMAN 25% PER 50 ML: Performed by: INTERNAL MEDICINE

## 2020-07-06 PROCEDURE — C9803 HOPD COVID-19 SPEC COLLECT: HCPCS | Performed by: INTERNAL MEDICINE

## 2020-07-06 PROCEDURE — 93010 ELECTROCARDIOGRAM REPORT: CPT | Performed by: INTERNAL MEDICINE

## 2020-07-06 PROCEDURE — 94799 UNLISTED PULMONARY SVC/PX: CPT

## 2020-07-06 PROCEDURE — P9047 ALBUMIN (HUMAN), 25%, 50ML: HCPCS | Performed by: INTERNAL MEDICINE

## 2020-07-06 PROCEDURE — 82962 GLUCOSE BLOOD TEST: CPT

## 2020-07-06 RX ORDER — ALBUMIN (HUMAN) 12.5 G/50ML
12.5 SOLUTION INTRAVENOUS AS NEEDED
Status: DISCONTINUED | OUTPATIENT
Start: 2020-07-06 | End: 2020-07-06 | Stop reason: SDUPTHER

## 2020-07-06 RX ORDER — WARFARIN SODIUM 5 MG/1
5 TABLET ORAL
Start: 2020-07-06 | End: 2020-07-07 | Stop reason: HOSPADM

## 2020-07-06 RX ORDER — ALBUMIN (HUMAN) 12.5 G/50ML
25 SOLUTION INTRAVENOUS ONCE
Status: COMPLETED | OUTPATIENT
Start: 2020-07-06 | End: 2020-07-06

## 2020-07-06 RX ORDER — CARVEDILOL 12.5 MG/1
12.5 TABLET ORAL 2 TIMES DAILY WITH MEALS
Start: 2020-07-06 | End: 2021-02-17 | Stop reason: HOSPADM

## 2020-07-06 RX ORDER — POLYETHYLENE GLYCOL 3350 17 G/17G
17 POWDER, FOR SOLUTION ORAL 2 TIMES DAILY PRN
Start: 2020-07-06 | End: 2021-02-17 | Stop reason: HOSPADM

## 2020-07-06 RX ORDER — ALBUMIN (HUMAN) 12.5 G/50ML
12.5 SOLUTION INTRAVENOUS AS NEEDED
Status: DISPENSED | OUTPATIENT
Start: 2020-07-06 | End: 2020-07-07

## 2020-07-06 RX ORDER — HYDROCODONE BITARTRATE AND ACETAMINOPHEN 5; 325 MG/1; MG/1
1 TABLET ORAL EVERY 8 HOURS PRN
Qty: 10 TABLET | Refills: 0 | Status: SHIPPED | OUTPATIENT
Start: 2020-07-06 | End: 2020-10-26

## 2020-07-06 RX ORDER — HYDROCORTISONE 25 MG/G
CREAM TOPICAL DAILY PRN
Start: 2020-07-06 | End: 2021-02-17 | Stop reason: HOSPADM

## 2020-07-06 RX ORDER — PROMETHAZINE HYDROCHLORIDE 25 MG/1
25 TABLET ORAL
Start: 2020-07-06 | End: 2021-02-17 | Stop reason: HOSPADM

## 2020-07-06 RX ADMIN — SODIUM CHLORIDE, PRESERVATIVE FREE 10 ML: 5 INJECTION INTRAVENOUS at 20:46

## 2020-07-06 RX ADMIN — NYSTATIN: 100000 POWDER TOPICAL at 20:45

## 2020-07-06 RX ADMIN — ISOSORBIDE MONONITRATE 30 MG: 30 TABLET, EXTENDED RELEASE ORAL at 09:19

## 2020-07-06 RX ADMIN — CALCITRIOL CAPSULES 0.25 MCG 0.25 MCG: 0.25 CAPSULE ORAL at 09:20

## 2020-07-06 RX ADMIN — ALBUMIN HUMAN 12.5 G: 0.25 SOLUTION INTRAVENOUS at 12:09

## 2020-07-06 RX ADMIN — SEVELAMER CARBONATE 0.8 G: 800 POWDER, FOR SUSPENSION ORAL at 11:55

## 2020-07-06 RX ADMIN — CARVEDILOL 12.5 MG: 12.5 TABLET, FILM COATED ORAL at 09:20

## 2020-07-06 RX ADMIN — DOCUSATE SODIUM 100 MG: 100 CAPSULE, LIQUID FILLED ORAL at 20:45

## 2020-07-06 RX ADMIN — POLYETHYLENE GLYCOL 3350 17 G: 17 POWDER, FOR SOLUTION ORAL at 17:38

## 2020-07-06 RX ADMIN — BUDESONIDE AND FORMOTEROL FUMARATE DIHYDRATE 2 PUFF: 160; 4.5 AEROSOL RESPIRATORY (INHALATION) at 19:54

## 2020-07-06 RX ADMIN — ALBUMIN HUMAN 25 G: 0.25 SOLUTION INTRAVENOUS at 13:07

## 2020-07-06 RX ADMIN — ASPIRIN 81 MG CHEWABLE TABLET 81 MG: 81 TABLET CHEWABLE at 09:19

## 2020-07-06 RX ADMIN — HYDROCORTISONE: 25 CREAM TOPICAL at 20:45

## 2020-07-06 RX ADMIN — Medication 250 MG: at 09:20

## 2020-07-06 RX ADMIN — INSULIN LISPRO 2 UNITS: 100 INJECTION, SOLUTION INTRAVENOUS; SUBCUTANEOUS at 12:06

## 2020-07-06 RX ADMIN — HEPARIN SODIUM 1800 UNITS: 1000 INJECTION INTRAVENOUS; SUBCUTANEOUS at 14:40

## 2020-07-06 RX ADMIN — ACETAMINOPHEN 650 MG: 325 TABLET, FILM COATED ORAL at 04:03

## 2020-07-06 RX ADMIN — SERTRALINE 50 MG: 50 TABLET, FILM COATED ORAL at 09:20

## 2020-07-06 RX ADMIN — DOCUSATE SODIUM 100 MG: 100 CAPSULE, LIQUID FILLED ORAL at 09:19

## 2020-07-06 RX ADMIN — CARVEDILOL 12.5 MG: 12.5 TABLET, FILM COATED ORAL at 17:25

## 2020-07-06 RX ADMIN — ONDANSETRON 4 MG: 2 INJECTION INTRAMUSCULAR; INTRAVENOUS at 01:24

## 2020-07-06 RX ADMIN — PANTOPRAZOLE SODIUM 40 MG: 40 TABLET, DELAYED RELEASE ORAL at 04:58

## 2020-07-06 RX ADMIN — ALBUMIN HUMAN 12.5 G: 0.25 SOLUTION INTRAVENOUS at 12:13

## 2020-07-06 RX ADMIN — HYDROCORTISONE: 25 CREAM TOPICAL at 09:20

## 2020-07-06 RX ADMIN — SODIUM CHLORIDE, PRESERVATIVE FREE 10 ML: 5 INJECTION INTRAVENOUS at 09:18

## 2020-07-06 RX ADMIN — ATORVASTATIN CALCIUM 80 MG: 40 TABLET, FILM COATED ORAL at 20:45

## 2020-07-06 RX ADMIN — ALBUMIN HUMAN 12.5 G: 0.25 SOLUTION INTRAVENOUS at 12:12

## 2020-07-06 RX ADMIN — NYSTATIN 1 APPLICATION: 100000 POWDER TOPICAL at 09:19

## 2020-07-06 RX ADMIN — HYDROCODONE BITARTRATE AND ACETAMINOPHEN 1 TABLET: 5; 325 TABLET ORAL at 09:20

## 2020-07-06 RX ADMIN — SEVELAMER CARBONATE 0.8 G: 800 POWDER, FOR SUSPENSION ORAL at 09:19

## 2020-07-06 RX ADMIN — BUDESONIDE AND FORMOTEROL FUMARATE DIHYDRATE 2 PUFF: 160; 4.5 AEROSOL RESPIRATORY (INHALATION) at 08:31

## 2020-07-06 RX ADMIN — SEVELAMER CARBONATE 0.8 G: 800 POWDER, FOR SUSPENSION ORAL at 17:25

## 2020-07-06 RX ADMIN — WARFARIN SODIUM 4 MG: 4 TABLET ORAL at 17:25

## 2020-07-06 RX ADMIN — HYDROCODONE BITARTRATE AND ACETAMINOPHEN 1 TABLET: 5; 325 TABLET ORAL at 04:58

## 2020-07-06 NOTE — PROGRESS NOTES
"Pharmacy Consult - Warfarin    Joy Bueno is a 70 y.o. female on warfarin therapy.    Height - 162.6 cm (64\")  Weight - (!) 141 kg (311 lb 8 oz)    Consulting Provider: Nanda Gonzalez DO  Indication: Hx of DVT  Goal INR: 2-3  Home Regimen: warfarin 6mg daily    Bridge Therapy: none    Drug-Drug Interactions with current regimen:  Pantoprazole - may increase INR    Warfarin Dosing During Admission:    Date  6/29 6/30 7/1 7/2 7/3 7/4 7/5 7/6    INR  3.97 4.64 3.65 3.6 2.18 1.81 2.04 2.31    Dose  0 0 0 0  5mg 5mg 4mg 4mg      Education Provided: Warfarin education provided on 7/3/20 in writing.    Discharge Follow up:   Following Provider - Resident herrera [facility]   Follow up time range or appointment - 2-3 days following discharge    Labs:    Results from last 7 days   Lab Units 07/06/20  0903 07/05/20  0525 07/04/20  0955 07/03/20  1146 07/02/20  1032 07/02/20  0522 07/01/20  1406 07/01/20  0254 06/30/20  1615 06/30/20  0930 06/30/20  0352 06/30/20  0048   INR  2.31* 2.04* 1.81* 2.18* 3.60*  --  3.65*  --   --  4.64*  --   --    HEMOGLOBIN g/dL 7.4*  --   --   --   --  7.9* 7.7* 7.9* 7.7*  --  7.8*  7.8* 8.1*   HEMATOCRIT % 25.8*  --   --   --   --  29.6* 26.6* 25.8* 25.5*  --  26.5*  26.5* 29.6*     Results from last 7 days   Lab Units 07/05/20  0525 07/03/20  1146 07/02/20  1032   SODIUM mmol/L 137 136 144   POTASSIUM mmol/L 4.9 3.5 4.1   CHLORIDE mmol/L 96* 96* 103   CO2 mmol/L 24.0 26.0 29.0   BUN mg/dL 60* 23 33*   CREATININE mg/dL 5.78* 3.06* 4.34*   CALCIUM mg/dL 8.5* 8.6 9.0   GLUCOSE mg/dL 200* 152* 164*     Current dietary intake: 75% of documented meals  Diet Order   Procedures    Diet Regular; Cardiac, Consistent Carbohydrate, Renal     Assessment/Plan:  Warfarin dosing for hx DVT  Goal INR: 2-3  7/6 INR - 2.31  7/6 H/H - 7.4/25.8    Continue warfarin 4mg daily   Monitor s/s bleeding, dietary intake, clinical status and drug-drug interactions  Follow daily INR and adjust dose " accordingly    Thanks  Andrez Orantes [facility]   7/6/2020  10:53

## 2020-07-06 NOTE — PROCEDURES
Dialysis notes  Acute drop in blood pressure in the 80s noted.  Case discussed with RN IV albumin given.  We will give more IV albumin at this time.  Cut down the ultrafiltration for now.  We hypotensive try to dialyze for 3 hours today.  Critical hypotensive will monitor closely.  Patient seen earlier

## 2020-07-06 NOTE — PROGRESS NOTES
Continued Stay Note  [facility]     Patient Name: Joy Bueno  MRN: [id number]  Today's Date: 7/6/2020    Admit Date: 6/29/2020    Discharge Plan     Row Name 07/06/20 1059       Plan    Plan  [facility]    Patient/Family in Agreement with Plan  yes    Plan Comments  Spoke with Keshawn at [facility].  Patient will need repeat Covid test prior to returning.  Ambulance scheduled for tomorrow, 7/7 at 1100.  Updated patient by phone.  Megha Tam RN [phone]    Final Discharge Disposition Code  03 - skilled nursing facility (SNF)        Discharge Codes    No documentation.       Expected Discharge Date and Time     Expected Discharge Date Expected Discharge Time    Jul 6, 2020             Megha Tam RN

## 2020-07-06 NOTE — DISCHARGE SUMMARY
[facility] Medicine Services  DISCHARGE SUMMARY    Patient Name: Joy Bueno  : 1950  MRN: [id number]    Date of Admission: 2020  3:34 AM  Date of Discharge:  2020  Primary Care Physician: Goldy Dior MD    Consults     Date and Time Order Name Status Description    2020 1346 Inpatient Infectious Diseases Consult Completed     2020 0655 Inpatient Nephrology Consult            Hospital Course     Presenting Problem:   Pneumonia [J18.9]  Epistaxis [R04.0]    Active Hospital Problems    Diagnosis  POA   • **Pneumonia due to COVID-19 virus [U07.1, J12.89]  Yes   • Acute blood loss anemia [D62]  Yes   • Pneumonia [J18.9]  Yes   • Bleeding hemorrhoids [K64.9]  Yes   • Anticoagulated on Coumadin [Z79.01]  Not Applicable   • History of deep vein thrombosis (DVT) of brachial vein of left upper extremity (CMS/[facility]) [I82.622]  Yes   • Chronic diastolic heart failure (CMS/[facility]) [I50.32]  Yes   • ESRD (end stage renal disease) (CMS/[facility]) [N18.6]  Yes   • Essential hypertension [I10]  Yes   • Morbid obesity (CMS/[facility]) [E66.01]  Yes      Resolved Hospital Problems    Diagnosis Date Resolved POA   • Epistaxis [R04.0] 2020 Yes   • Supratherapeutic INR [R79.1] 2020 Yes   • Volume overload [E87.70] 2020 Yes   • Acute respiratory failure with hypoxia (CMS/[facility]) [J96.01] 2020 Yes          Hospital Course:  Joy Bueno is a 70 y.o. female who is a nursing home resident of [facility] with past medical history is significant for severe morbid obesity, end-stage renal disease on hemodialysis , coronary artery disease, diabetes, chronic anticoagulation for history of DVT, stroke, obstructive sleep apnea, and chronic hemorrhoids and anal fissure who presents to [facility] with complaint of hemoptysis and headache.  She was found to have acute respiratory failure with hypoxia, volume overload secondary to multiple  missed dialysis sessions, and was positive for COVID-19.    Patient received dialysis for volume overload and hypoxia resolved.  It appears that her respiratory failure was related to volume status much more significantly than COVID-19.  She is currently breathing comfortably on room air oxygen and has minimal COVID-19 symptoms at this time.  It was determined after discussing with infectious disease that convalescent plasma and steroids were not thought to be beneficial in her case based on a risk versus benefit assessment of these experimental treatments.  She has now stabilized and is felt to be ready to transfer back to her facility where she can continue to receive dialysis and supportive care while she recovers from her hospitalization.     Acute respiratory failure with hypoxia and hypercapnia  COVID-19 positive with pneumonia  Volume overload secondary to missed dialysis  Obstructive sleep apnea  Possible obesity hypoventilation syndrome  CT chest notable for findings consistent with pneumonia and volume overload.  Consulted infectious disease to assist with COVID management  Probiotic  Discontinued antibiotics  BiPAP for hypercapnia intermittently and with sleep.     Epistaxis causing hemoptysis with acute blood loss anemia  Supratherapeutic INR  Bleeding stopped, suspect epistaxis causing hemoptysis with patient coughing up blood from the nosebleed.    All bleeding has been stopped and no further signs of bleeding for several days now.  Early in the hospital stay, significant blood loss noted with epistaxis but has resolved and hemoglobin stabilized.  No GI bleed noted.  Rhino Rocket has been removed.       Fecal occult positives, hemorrhoids, anemia of chronic kidney disease and chronic disease:  Source of bleeding was felt to be mostly due to epistaxis however patient did have some slight oozing from hemorrhoids.  She was placed on suppositories which resolved hemorrhoid symptoms.  Hemoglobin appears to  be stabilizing.  No further bleeding noted.    Recommend further outpatient work-up if patient has further issues regarding hemorrhoids or anemia.     History of DVT on chronic coagulation with warfarin:  Dosing per pharmacy.    Held initially due to supratherapeutic INR.  Warfarin has been restarted at a lower dose of 2 mg daily, recommend careful monitoring at facility.     Essential hypertension:  Carvedilol dose decreased.    Monitor blood pressure and adjust further as needed     Hyperlipidemia: Stable.  Statin.     Diabetes: Glucose monitored and patient received correction insulin     End-stage renal disease on hemodialysis: Nephrology following for dialysis.  Patient counseled on avoiding missed dialysis sessions    Probable renal cysts: CT scan below shows probable renal cyst however radiologist stated patient could consider having repeat study with renal protocol.  I explained to patient the radiologist recommendation and she agreed to discuss with her primary care at follow-up.  We will send a copy of discharge summary to patient's primary care provider so they can discuss whether patient wishes to pursue further imaging.       At the time of discharge patient was told to take all medications as prescribed, keep all follow-up appointments, and call their doctor or return to the hospital with any worsening or concerning symptoms.    Please note that dragon voice recognition software was used to create this note and that transcription errors are possible.    Addendum: Transfer delayed from 7/6 to 7/7 as facility requested repeat COVID test which was again positive.  Patient remains stable for transfer, however discharge canceled again on 7/7 due to anemia.  Warfarin decreased further today to 3 mg.  Recommend INR check tomorrow and continue hydration per facility pharmacist or provider.  Dosing discussed with [facility] pharmacist today.  I discussed his case with nephrologist who recommended to hold off on  erythropoietin despite anemia.  He is concerned that this dose of medication would not be appropriate while patient is covered positive due to risk of thrombosis.  He states that they can transfuse her during dialysis if needed however patient currently asymptomatic despite anemia.      Addendum: Transfer was delayed again from 7/6 to 7/8 as CBC came back with worsening anemia and patient received 1 unit blood transfusion.  Patient had bleeding hemorrhoids in the setting of anticoagulation which is now stopped.  Gastroenterology was consulted and recommended sitz bath 3 times daily for 1 week as well as continuing Anusol suppository.  They recommend outpatient evaluation from colorectal surgery once her COVID-19 has resolved.    Discharge Follow Up Recommendations for outpatient labs/diagnostics:  Patient will need INR check at facility on Wednesday with goal INR 2-3, recommend to adjust further per facility provider, please call with any questions otherwise.  Recommend primary care follow-up within 1 week or facility provider follow-up within 1 week.    Day of Discharge     HPI:   Breathing stable.  No longer feeling short of breath.  Awaiting dialysis today.  Requesting discharge today.    Review of Systems  No current fevers or chills  No current dysuria or hematuria  No current nausea, vomiting, or diarrhea  No current chest pain or palpitations    Vital Signs:   Temp:  [96.6 °F (35.9 °C)-98.2 °F (36.8 °C)] 96.9 °F (36.1 °C)  Heart Rate:  [68-98] 69  Resp:  [14-19] 19  BP: (128-153)/(52-75) 141/53     Physical Exam:  Constitutional:Awake, alert  HENT: NCAT, mucous membranes moist, neck supple  Respiratory: Minimal cough, currently on room air, respiratory rate normal  Cardiovascular: RRR, normal radial pulses  Gastrointestinal: Positive bowel sounds, soft, nontender, nondistended  Musculoskeletal: Elderly and chronically debilitated in appearance, severe morbid obesity, some lower extremity edema, BMI  53  Psychiatric: Appropriate affect, cooperative, conversational  Neurologic: No slurred speech or facial droop, follows commands  Skin: No rashes or jaundice, warm       Pertinent  and/or Most Recent Results     Results from last 7 days   Lab Units 07/08/20  0809 07/07/20  0547 07/06/20  0903 07/05/20  0525 07/03/20  1146 07/02/20  1032 07/02/20  0522 07/01/20  1406   WBC 10*3/mm3 7.81 7.01 8.08  --   --   --  6.46  --    HEMOGLOBIN g/dL 8.3* 6.8* 7.4*  --   --   --  7.9* 7.7*   HEMATOCRIT % 26.8* 22.6* 25.8*  --   --   --  29.6* 26.6*   PLATELETS 10*3/mm3 217 216 225  --   --   --  191  --    SODIUM mmol/L 132*  --   --  137 136 144  --  140   POTASSIUM mmol/L 5.1  --   --  4.9 3.5 4.1  --  3.7   CHLORIDE mmol/L 95*  --   --  96* 96* 103  --  101   CO2 mmol/L 24.0  --   --  24.0 26.0 29.0  --  29.0   BUN mg/dL 67*  --   --  60* 23 33*  --  25*   CREATININE mg/dL 6.06*  --   --  5.78* 3.06* 4.34*  --  2.85*   GLUCOSE mg/dL 146*  --   --  200* 152* 164*  --  131*   CALCIUM mg/dL 8.9  --   --  8.5* 8.6 9.0  --  8.7     Results from last 7 days   Lab Units 07/08/20  0809 07/07/20  0546 07/06/20  0903 07/05/20  0525 07/04/20  0955 07/03/20  1146 07/02/20  1032   PROTIME Seconds 31.1* 28.3* 25.1* 22.7* 20.7* 23.9* 35.7*   INR  3.03* 2.69* 2.31* 2.04* 1.81* 2.18* 3.60*           Invalid input(s): TG, LDLCALC, LDLREALC        Brief Urine Lab Results  (Last result in the past 365 days)      Color   Clarity   Blood   Leuk Est   Nitrite   Protein   CREAT   Urine HCG        06/29/20 0425 Red Turbid Large (3+) Large (3+) Negative >=300 mg/dL (3+)               Microbiology Results Abnormal     Procedure Component Value - Date/Time    COVID PRE-OP / PRE-PROCEDURE SCREENING ORDER (NO ISOLATION) - Swab, Nasopharynx [[id number]] Collected:  07/06/20 1300    Lab Status:  Final result Specimen:  Swab from Nasopharynx Updated:  07/06/20 1740    Narrative:       The following orders were created for panel order COVID PRE-OP /  PRE-PROCEDURE SCREENING ORDER (NO ISOLATION) - Swab, Nasopharynx.  Procedure                               Abnormality         Status                     ---------                               -----------         ------                     COVID-19,[facility]...[[id number]]  Abnormal            Final result                 Please view results for these tests on the individual orders.    COVID-19,[facility], NP SWAB IN TRANSPORT MEDIA 8-12 HR TAT - Swab, Nasopharynx [[id number]]  (Abnormal) Collected:  07/06/20 1300    Lab Status:  Final result Specimen:  Swab from Nasopharynx Updated:  07/06/20 1740     COVID19 Detected    Narrative:       Fact sheet for providers: https://www.fda.gov/media/205412/download     Fact sheet for patients: https://www.fda.gov/media/816791/download    Blood Culture - Blood, Arm, Left [[id number]] Collected:  06/29/20 0650    Lab Status:  Final result Specimen:  Blood from Arm, Left Updated:  07/04/20 0815     Blood Culture No growth at 5 days    Blood Culture - Blood, Arm, Left [[id number]]  (Abnormal) Collected:  06/29/20 0630    Lab Status:  Edited Result - FINAL Specimen:  Blood from Arm, Left Updated:  07/03/20 0608     Blood Culture Staphylococcus, coagulase negative     Comment: Probable contaminant requires clinical correlation, susceptibility not performed unless requested by physician.          Isolated from Aerobic Bottle     Gram Stain Aerobic Bottle Gram positive cocci in pairs and clusters      Anaerobic Bottle Gram positive cocci in clusters     Comment: Appended report. These results have been appended to a previously final verified report.       Blood Culture ID, PCR - Blood, Arm, Left [[id number]]  (Abnormal) Collected:  06/29/20 0630    Lab Status:  Final result Specimen:  Blood from Arm, Left Updated:  06/30/20 0918     BCID, PCR Staphylococcus spp, not aureus. Identification by BCID PCR.    COVID-19,[facility], NP SWAB IN TRANSPORT MEDIA 8-12 HR TAT - Swab,  Nasopharynx [[id number]]  (Abnormal) Collected:  06/29/20 0814    Lab Status:  Final result Specimen:  Swab from Nasopharynx Updated:  06/29/20 1345     COVID19 Detected    Narrative:       Fact sheet for providers: https://www.fda.gov/media/118030/download     Fact sheet for patients: https://www.fda.gov/media/903451/download    Respiratory Panel, PCR - Swab, Nasopharynx [[id number]]  (Normal) Collected:  06/29/20 0814    Lab Status:  Final result Specimen:  Swab from Nasopharynx Updated:  06/29/20 1029     ADENOVIRUS, PCR Not Detected     Coronavirus 229E Not Detected     Coronavirus HKU1 Not Detected     Coronavirus NL63 Not Detected     Coronavirus OC43 Not Detected     Human Metapneumovirus Not Detected     Human Rhinovirus/Enterovirus Not Detected     Influenza B PCR Not Detected     Parainfluenza Virus 1 Not Detected     Parainfluenza Virus 2 Not Detected     Parainfluenza Virus 3 Not Detected     Parainfluenza Virus 4 Not Detected     Bordetella pertussis pcr Not Detected     Influenza A H1 2009 PCR Not Detected     Chlamydophila pneumoniae PCR Not Detected     Mycoplasma pneumo by PCR Not Detected     Influenza A PCR Not Detected     Influenza A H3 Not Detected     Influenza A H1 Not Detected     RSV, PCR Not Detected     Bordetella parapertussis PCR Not Detected    Narrative:       The coronavirus on the RVP is NOT COVID-19 and is NOT indicative of infection with COVID-19.           Imaging Results (All)     Procedure Component Value Units Date/Time    CT Abdomen Pelvis Without Contrast [[id number]] Collected:  06/29/20 0534     Updated:  06/29/20 0536    Narrative:       CT Abdomen Pelvis WO    INDICATION:   Rectal bleeding and vaginal bleeding that started this morning.    TECHNIQUE:   CT of the abdomen and pelvis without IV contrast. Coronal and sagittal reconstructions were obtained.  Radiation dose reduction techniques included automated exposure control or exposure modulation based on body size. Count  of known CT and cardiac nuc  med studies performed in previous 12 months: 2.     COMPARISON:   4/27/2019    FINDINGS:  For description of findings in the lung bases, see chest CT report dictated separately. There is some artifact due to body habitus and the patient's arms. Gallbladder is within normal limits. There is atherosclerotic disease, but there is no aortic  aneurysm. There is bilateral renal cortical thinning. Numerous bilateral exophytic renal lesions are again seen. These are likely cysts but are incompletely characterized. No renal or ureteral stones are seen. There is no hydronephrosis. Remaining solid  organs are grossly normal. The uterus is enlarged. Contains some calcifications that are likely the result of fibroids. Urinary bladder is decompressed and poorly evaluated. There is colonic diverticulosis without evidence of acute diverticulitis. The  appendix is not clearly identified, but there is no evidence of acute appendicitis. No small bowel obstruction is seen. Again seen is a ventral wall hernia just above the previously placed mesh. This contains abdominal fat and some fluid, similar in  appearance to the prior study.      Impression:         1. Technically degraded exam as above.  2. Colonic diverticulosis without definite diverticulitis. No bowel obstruction is seen.  3. Uterine enlargement with probable fibroids.  4. No renal or ureteral stones. No hydronephrosis.  5. Bilateral renal lesions, probably all cysts. However, these are incompletely characterized. Consider nonemergent follow-up with renal protocol CT or MRI with and without contrast when clinically feasible.  6. Additional findings as above.          Signer Name: Ricardo Pate MD   Signed: 6/29/2020 5:34 AM   Workstation Name: [email or web address]    [facility]    CT Chest Without Contrast [[id number]] Collected:  06/29/20 0527     Updated:  06/29/20 0529    Narrative:       CT Chest WO    INDICATION:   Fever and  cough. Prior history of positive COVID 19 test.    TECHNIQUE:   CT of the thorax without IV contrast. Coronal and sagittal reconstructions were obtained.  Radiation dose reduction techniques included automated exposure control or exposure modulation based on body size. Count of known CT and cardiac nuc med studies  performed in previous 12 months: 2.     COMPARISON:   4/27/2019     FINDINGS:  The heart is enlarged. No pericardial effusion. There are trace bilateral pleural effusions. There are a few mildly enlarged mediastinal lymph nodes, but no adenopathy by size criteria is seen. Lung window images are degraded by motion. There is some  atelectasis in the lower lobes. There is also septal thickening in the lungs, right greater than left. There is patchy groundglass opacity throughout the right lung and in the left lower lung. Findings may reflect pulmonary edema, but pneumonitis is not  excluded. Imaging features can be seen with COVID-19 pneumonia, although are nonspecific and can can occur with a variety of infectious and noninfectious processes. Dialysis catheter tip is in the lower SVC. For description of findings in the upper  abdomen, see abdomen and pelvis CT report dictated separately.      Impression:         1. Bilateral infiltrates as above concerning for asymmetric pulmonary edema. Pneumonia not completely excluded. Imaging features can be seen with COVID-19 pneumonia, although are nonspecific and can can occur with a variety of infectious and  noninfectious processes.  2. Trace bilateral pleural effusions.  3. Cardiomegaly.    Signer Name: Ricardo Pate MD   Signed: 6/29/2020 5:27 AM   Workstation Name: [email or web address]    [facility]    CT Head Without Contrast [[id number]] Collected:  06/29/20 0523     Updated:  06/29/20 0525    Narrative:       CT Head WO    HISTORY:   Bleeding from the nose and ears this morning.    TECHNIQUE:   Axial unenhanced head CT with multiplanar  reformats. Radiation dose reduction techniques included automated exposure control or exposure modulation based on body size. Count of known CT and cardiac nuc med studies performed in previous 12 months: 2.     COMPARISON:   10/14/2019    FINDINGS:   Exam is motion degraded. There is generalized atrophy. Ventricular size and configuration are stable. Chronic small vessel ischemic changes are present in the white matter. Old bilateral cerebellar infarcts are present, right larger than left.  No acute  infarct or hemorrhage is seen. There is a mucous retention cyst in the left maxillary sinus. There is mucosal thickening in the nasal cavity and in the ethmoid air cells. Right mastoid effusion is present. No skull fracture. Atherosclerotic  calcifications are present in the carotid siphons.      Impression:         1. Motion degraded exam, but no definite acute findings in the brain.  2. Mucosal thickening in the ethmoid air cells and in the nasal cavity. There is also a right mastoid effusion.    Signer Name: Ricardo Pate MD   Signed: 6/29/2020 5:23 AM   Workstation Name: [email or web address]    [facility]          Results for orders placed during the hospital encounter of 09/29/19   Duplex Carotid Ultrasound CAR    Narrative · Right internal carotid artery stenosis of 0-49%.  · Left internal carotid artery stenosis of 0-49%.          Results for orders placed during the hospital encounter of 09/29/19   Duplex Carotid Ultrasound CAR    Narrative · Right internal carotid artery stenosis of 0-49%.  · Left internal carotid artery stenosis of 0-49%.          Results for orders placed during the hospital encounter of 09/29/19   Adult Transthoracic Echo Complete W/ Cont if Necessary Per Protocol    Narrative · Estimated EF = 60%.  · Left ventricular systolic function is normal.            Discharge Details        Discharge Medications      New Medications      Instructions Start Date   Hydrocortisone  (Perianal) 2.5 % rectal cream  Commonly known as:  ANUSOL-HC   Rectal, Daily PRN      PHARMACY CONSULT   Does not apply, Continuous PRN         Changes to Medications      Instructions Start Date   carvedilol 12.5 MG tablet  Commonly known as:  COREG  What changed:    medication strength  how much to take   12.5 mg, Oral, 2 Times Daily With Meals      Cetirizine HCl 10 MG capsule  Commonly known as:  ZyrTEC Allergy  What changed:    when to take this  reasons to take this   10 mg, Oral, Daily PRN      HYDROcodone-acetaminophen 5-325 MG per tablet  Commonly known as:  NORCO  What changed:  Another medication with the same name was removed. Continue taking this medication, and follow the directions you see here.   1 tablet, Oral, Every 8 Hours PRN      polyethylene glycol 17 g packet  Commonly known as:  MIRALAX  What changed:    medication strength  when to take this  reasons to take this   17 g, Oral, 2 Times Daily PRN      promethazine 25 MG tablet  Commonly known as:  PHENERGAN  What changed:    when to take this  reasons to take this   25 mg, Oral, 3 Times Weekly PRN, On dialysis days (Mon Weds Fri)       warfarin 2 MG tablet  Commonly known as:  COUMADIN  What changed:    medication strength  how much to take  when to take this  additional instructions   2 mg, Oral, Nightly         Continue These Medications      Instructions Start Date   acetaminophen 325 MG tablet  Commonly known as:  TYLENOL   650 mg, Oral, Every 6 Hours PRN      albuterol sulfate  (90 Base) MCG/ACT inhaler  Commonly known as:  PROVENTIL HFA;VENTOLIN HFA;PROAIR HFA   2 puffs, Inhalation, Every 4 Hours PRN      aspirin 81 MG chewable tablet   81 mg, Oral, Daily      atorvastatin 80 MG tablet  Commonly known as:  LIPITOR   80 mg, Oral, Nightly      calcitriol 0.25 MCG capsule  Commonly known as:  ROCALTROL   0.25 mcg, Oral, Daily      docusate sodium 100 MG capsule  Commonly known as:  Colace   100 mg, Oral, 2 Times Daily       fluticasone 50 MCG/ACT nasal spray  Commonly known as:  FLONASE   2 sprays, Nasal, Daily      fluticasone-salmeterol 500-50 MCG/DOSE DISKUS  Commonly known as:  ADVAIR   1 puff, Inhalation, 2 Times Daily - RT      insulin aspart 100 UNIT/ML solution pen-injector sc pen  Commonly known as:  novoLOG FLEXPEN   5 Units, Subcutaneous, 3 Times Daily With Meals      ipratropium-albuterol 0.5-2.5 mg/3 ml nebulizer  Commonly known as:  DUO-NEB   3 mL, Nebulization, Every 6 Hours PRN      isosorbide mononitrate 30 MG 24 hr tablet  Commonly known as:  IMDUR   30 mg, Oral, Daily      Lidocaine 2 % gel   Apply externally, 3 Times Daily PRN      meclizine 12.5 MG tablet  Commonly known as:  ANTIVERT   12.5 mg, Oral, 3 Times Daily PRN      melatonin 3 MG tablet   3 mg, Oral, Nightly      neomycin-polymyxin-hydrocortisone 3.5-97956-7 otic solution  Commonly known as:  CORTISPORIN   3 drops, Both Ears, 3 Times Daily      ondansetron 4 MG tablet  Commonly known as:  ZOFRAN   4 mg, Oral, Every 8 Hours PRN      pantoprazole 40 MG EC tablet  Commonly known as:  PROTONIX   40 mg, Oral, Daily      Preparation H 0.25-88.44 % suppository suppository  Generic drug:  phenylephrine-cocoa Butter   Rectal, As Needed      saccharomyces boulardii 250 MG capsule  Commonly known as:  FLORASTOR   250 mg, Oral, Daily      sertraline 50 MG tablet  Commonly known as:  ZOLOFT   50 mg, Oral, Daily      sevelamer 800 MG tablet  Commonly known as:  RENVELA   800 mg, Oral, 3 Times Daily With Meals, 0500, 1200 & 1700      Tab-A-Juana tablet   1 tablet, Oral, Daily      vitamin D 1.25 MG (54813 UT) capsule capsule  Commonly known as:  ERGOCALCIFEROL   50,000 Units, Oral, Weekly, TAKES ON WEDNESDAYS         Stop These Medications    diltiaZEM (CARDIZEM) 2% cream            Please also note we are recommending patient have sitz bath 3 times daily for 1 week in addition to medications as per above for recently bleeding hemorrhoids.      Allergies   Allergen  Reactions   • Geodon [Ziprasidone Hcl] Unknown (See Comments)     PT DOESN'T REMEMBER   • Haldol [Haloperidol Lactate] Unknown (See Comments)     PT DOESN'T REMEMBER   • Seroquel [Quetiapine Fumarate] Unknown (See Comments)     PT DOESN'T REMEMBER         Discharge Disposition:  Skilled Nursing Facility (DC - External)    Diet:  Hospital:  Diet Order   Procedures   • Diet Regular; Cardiac, Consistent Carbohydrate, Renal       Activity:  Activity Instructions     Up WIth Assist               CODE STATUS:    Code Status and Medical Interventions:   Ordered at: 06/29/20 0804     Level Of Support Discussed With:    Patient     Code Status:    CPR     Medical Interventions (Level of Support Prior to Arrest):    Full           Additional Instructions for the Follow-ups that You Need to Schedule     Discharge Follow-up with PCP   As directed       Currently Documented PCP:    Goldy Dior MD    PCP Phone Number:    [phone]     Follow Up Details:  1 week         Discharge Follow-up with Specialty: Colorectal surgery follow-up recommended for hemorrhoids in 1 month after your COVID-19 infection has resolved.   As directed      Specialty:  Colorectal surgery follow-up recommended for hemorrhoids in 1 month after your COVID-19 infection has resolved.         Discharge Follow-up with Specified Provider: Check INR Wednesday and adjust warfarin further as needed per facility doctor or primary care or facility pharmacist   As directed      To:  Check INR Wednesday and adjust warfarin further as needed per facility doctor or primary care or facility pharmacist                     Electronically signed by Trevon Briseno MD, 07/06/20, 7:56 AM.      Time Spent on Discharge:  I spent  33  minutes on this discharge activity which included: face-to-face encounter with the patient, reviewing the data in the system, coordination of the care with the nursing staff as well as consultants, documentation, and entering  orders.        Electronically signed by Trevon Briseno MD, 07/08/20, 11:17 AM.

## 2020-07-06 NOTE — DISCHARGE PLACEMENT REQUEST
"Pam Loyd (70 y.o. Female)     Megha Tam, RN  [phone]    Date of Birth Social Security Number Address Home Phone MRN    1950  [address] [facility] [address]  [place] [id number] [phone] [id number]    [employer] Marital Status          None        Admission Date Admission Type Admitting Provider Attending Provider Department, Room/Bed    6/29/20 Emergency Trevon Briseno MD Furlow, Stephen Matthew, MD [facility] 6A, N615/1    Discharge Date Discharge Disposition Discharge Destination         Home or Self Care              Attending Provider:  Trevon Briseno MD    Allergies:  Geodon [Ziprasidone Hcl], Haldol [Haloperidol Lactate], Seroquel [Quetiapine Fumarate]    Isolation:  Contact Air   Infection:  COVID (confirmed) (06/29/20)   Code Status:  CPR    Ht:  162.6 cm (64\")   Wt:  141 kg (311 lb 8 oz)    Admission Cmt:  None   Principal Problem:  Pneumonia due to COVID-19 virus [U07.1,J12.89]                 Active Insurance as of 6/29/2020     Primary Coverage     Payor Plan Insurance Group Employer/Plan Group    MEDICARE MEDICARE A & B      Payor Plan Address Payor Plan Phone Number Payor Plan Fax Number Effective Dates    [address] [phone]  5/1/2016 - None Entered    [facility] [address]       Subscriber Name Subscriber Birth Date Member ID       PAM LOYD 1950 [id number]           Secondary Coverage     Payor Plan Insurance Group Employer/Plan Group    [place]      Payor Plan Address Payor Plan Phone Number Payor Plan Fax Number Effective Dates    [address] [phone]  8/2/2018 - None Entered    [place] [address]       Subscriber Name Subscriber Birth Date Member ID       PAM LOYD 1950 [id number]                 Emergency Contacts      (Rel.) Home Phone Work Phone Mobile Phone    Tessie Dorado (Daughter) [phone] -- [phone]                                  "               Trevon Briseno MD   Physician   Hospitalist          Progress Notes   Signed        Date of Service:  20   Creation Time:  20                             Signed                Expand All Collapse All                      Expand widget buttonCollapse widget button        Show:Clear all      ManualTemplateCopied    Added by:          Trevon Briseno MD      [facility] for detailscustomization button                                                                                                                                                                                                                                     untitled image         [facility] Medicine Services    PROGRESS NOTE         Patient Name: Joy Bueno    : 1950    MRN: [id number]         Date of Admission: 2020    Primary Care Physician: Goldy Dior MD              Subjective            Subjective            CC:    Follow-up shortness of breath and COVID-19         HPI:    No new complaints.  No new nursing issues reported.         Review of Systems    No current fevers or chills    No current dysuria or hematuria    No current nausea, vomiting, or diarrhea    No current chest pain or palpitations                       Objective            Objective            Vital Signs:     Temp:  [94.4 °F (34.7 °C)-99.6 °F (37.6 °C)] 97.6 °F (36.4 °C)    Heart Rate:  [71-78] 75    Resp:  [12-18] 18    BP: (100-128)/(51-63) 100/53             Physical Exam:    Constitutional:Awake, alert    HENT: NCAT, mucous membranes moist, neck supple    Respiratory: Minimal cough, currently on room air, respiratory rate normal    Cardiovascular: RRR, normal radial pulses    Gastrointestinal: Positive bowel sounds, soft, nontender, nondistended    Musculoskeletal: Elderly and chronically debilitated in appearance, severe morbid obesity, some lower extremity edema, BMI  53    Psychiatric: Appropriate affect, cooperative, conversational    Neurologic: No slurred speech or facial droop, follows commands    Skin: No rashes or jaundice, warm              Results Reviewed:                     Results from last 7 days       Lab     Units     07/05/20    0525 07/04/20    0955     07/03/20    1146           07/02/20    0522     07/01/20    1406     07/01/20    0254           06/30/20    0352           06/29/20    0530       WBC     10*3/mm3      --       --       --       --      6.46      --       --       --      6.72      --      7.15       HEMOGLOBIN     g/dL      --       --       --       --      7.9*     7.7*     7.9*       < >     7.8*  7.8*       < >     10.8*       HEMATOCRIT     %      --       --       --       --      29.6*     26.6*     25.8*       < >     26.5*  26.5*       < >     36.4       PLATELETS     10*3/mm3      --       --       --       --      191      --       --       --      217      --      251       INR           2.04*     1.81*     2.18*       < >      --      3.65*      --        < >      --       --      3.97*       PROCALCITONIN     ng/mL      --       --       --       --       --       --       --       --       --       --      0.32*  0.34*        < > = values in this interval not displayed.                          Results from last 7 days       Lab     Units     07/05/20    0525     07/03/20    1146     07/02/20    1032           06/30/20    0048     06/29/20    1711     06/29/20    1137     06/29/20    0530       SODIUM     mmol/L     137     136     144       < >      --       --       --      140       POTASSIUM     mmol/L     4.9     3.5     4.1       < >      --       --       --      4.3       CHLORIDE     mmol/L     96*     96*     103       < >      --       --       --      98       CO2     mmol/L     24.0     26.0     29.0       < >      --       --       --      27.0       BUN     mg/dL     60*     23     33*       < >      --        --       --      88*       CREATININE     mg/dL     5.78*     3.06*     4.34*       < >      --       --       --      7.17*       GLUCOSE     mg/dL     200*     152*     164*       < >      --       --       --      161*       CALCIUM     mg/dL     8.5*     8.6     9.0       < >      --       --       --      7.7*       ALT (SGPT)     U/L      --       --       --       --       --       --       --      10       AST (SGOT)     U/L      --       --       --       --       --       --       --      15       TROPONIN T     ng/mL      --       --       --       --      0.108*     0.103*     0.108*     0.127*       PROBNP     pg/mL      --       --       --       --       --       --       --      14,663.0*        < > = values in this interval not displayed.            Estimated Creatinine Clearance: 12.9 mL/min (A) (by C-G formula based on SCr of 5.78 mg/dL (H)).                    Microbiology Results Abnormal                    Procedure       Component       Value       -       Date/Time               Blood Culture - Blood, Arm, Left [[id number]]     Collected:  06/29/20 0650             Lab Status:  Final result     Specimen:  Blood from Arm, Left     Updated:  07/04/20 0815                   Blood Culture     No growth at 5 days             Blood Culture - Blood, Arm, Left [[id number]]  (Abnormal)     Collected:  06/29/20 0630             Lab Status:  Edited Result - FINAL     Specimen:  Blood from Arm, Left     Updated:  07/03/20 0608                   Blood Culture     Staphylococcus, coagulase negativeCritical                       Comment:     Probable contaminant requires clinical correlation, susceptibility not performed unless requested by physician.                             Isolated from     Aerobic Bottle                   Gram Stain     Aerobic Bottle Gram positive cocci in pairs and clustersCritical                         Anaerobic Bottle Gram positive cocci in clustersCritical                        Comment:     Appended report. These results have been appended to a previously final verified report.                  Blood Culture ID, PCR - Blood, Arm, Left [[id number]]  (Abnormal)     Collected:  06/29/20 0630             Lab Status:  Final result     Specimen:  Blood from Arm, Left     Updated:  06/30/20 0918                   BCID, PCR     Staphylococcus spp, not aureus. Identification by BCID PCR.Critical             COVID-19,[facility] IN-HOUSE, NP SWAB IN TRANSPORT MEDIA 8-12 HR TAT - Swab, Nasopharynx [[id number]]  (Abnormal)     Collected:  06/29/20 0814             Lab Status:  Final result     Specimen:  Swab from Nasopharynx     Updated:  06/29/20 1345                   COVID19     DetectedCritical             Narrative:                Fact sheet for providers: https://www.fda.gov/media/626041/download          Fact sheet for patients: https://www.fda.gov/media/189197/download             Respiratory Panel, PCR - Swab, Nasopharynx [[id number]]  (Normal)     Collected:  06/29/20 0814             Lab Status:  Final result     Specimen:  Swab from Nasopharynx     Updated:  06/29/20 1029                   ADENOVIRUS, PCR     Not Detected                   Coronavirus 229E     Not Detected                   Coronavirus HKU1     Not Detected                   Coronavirus NL63     Not Detected                   Coronavirus OC43     Not Detected                   Human Metapneumovirus     Not Detected                   Human Rhinovirus/Enterovirus     Not Detected                   Influenza B PCR     Not Detected                   Parainfluenza Virus 1     Not Detected                   Parainfluenza Virus 2     Not Detected                   Parainfluenza Virus 3     Not Detected                   Parainfluenza Virus 4     Not Detected                   Bordetella pertussis pcr     Not Detected                   Influenza A H1 2009 PCR     Not Detected                   Chlamydophila pneumoniae PCR     Not  Detected                   Mycoplasma pneumo by PCR     Not Detected                   Influenza A PCR     Not Detected                   Influenza A H3     Not Detected                   Influenza A H1     Not Detected                   RSV, PCR     Not Detected                   Bordetella parapertussis PCR     Not Detected             Narrative:                The coronavirus on the RVP is NOT COVID-19 and is NOT indicative of infection with COVID-19.                                    Imaging Results (Last 24 Hours)                ** No results found for the last 24 hours. **                                  Results for orders placed during the hospital encounter of 09/29/19       Adult Transthoracic Echo Complete W/ Cont if Necessary Per Protocol             Narrative     · Estimated EF = 60%.    · Left ventricular systolic function is normal.                      I have reviewed the medications:    Scheduled Meds:           aspirin     81 mg     Oral     Daily       atorvastatin     80 mg     Oral     Nightly       budesonide-formoterol     2 puff     Inhalation     BID - RT       calcitriol     0.25 mcg     Oral     Daily       carvedilol     12.5 mg     Oral     BID With Meals       docusate sodium     100 mg     Oral     BID       Hydrocortisone (Perianal)           Rectal     BID       insulin lispro     0-7 Units     Subcutaneous     TID AC       isosorbide mononitrate     30 mg     Oral     Daily       nystatin           Topical     Q12H       pantoprazole     40 mg     Oral     Q AM       saccharomyces boulardii     250 mg     Oral     Daily       sertraline     50 mg     Oral     Daily       sevelamer     800 mg     Oral     TID With Meals       sodium chloride     10 mL     Intravenous     Q12H       warfarin     5 mg     Oral     Daily            Continuous Infusions:           Pharmacy Consult                  PRN Meds:.•  acetaminophen    •  dextrose    •  dextrose    •  glucagon (human  recombinant)    •  heparin (porcine)    •  HYDROcodone-acetaminophen    •  ondansetron    •  Pharmacy Consult    •  polyethylene glycol    •  sodium chloride                  Assessment/Plan            Assessment & Plan                    Active Hospital Problems             Diagnosis           POA       •     **Pneumonia due to COVID-19 virus [U07.1, J12.89]           Yes       •     Acute blood loss anemia [D62]           Yes       •     Pneumonia [J18.9]           Yes       •     Hemorrhoids [K64.9]           Yes       •     Supratherapeutic INR [R79.1]           Yes       •     Volume overload [E87.70]           Yes       •     Anticoagulated on Coumadin [Z79.01]           Not Applicable       •     History of deep vein thrombosis (DVT) of brachial vein of left upper extremity (CMS/[facility]) [I82.622]           Yes       •     Chronic diastolic heart failure (CMS/[facility]) [I50.32]           Yes       •     ESRD (end stage renal disease) (CMS/[facility]) [N18.6]           Yes       •     Acute respiratory failure with hypoxia (CMS/[facility]) [J96.01]           Yes       •     Essential hypertension [I10]           Yes       •     Morbid obesity (CMS/[facility]) [E66.01]           Yes               Resolved Hospital Problems             Diagnosis     Date Resolved     POA       •     Epistaxis [R04.0]     07/01/2020     Yes                 Brief Hospital Course to date:    Joy Bueno is a 70 y.o. female who is a nursing home resident of [facility] with past medical history is significant for severe morbid obesity, end-stage renal disease on hemodialysis Monday Wednesday Friday, coronary artery disease, diabetes, chronic anticoagulation for history of DVT, stroke, obstructive sleep apnea, and chronic hemorrhoids and anal fissure who presents to [facility] with complaint of hemoptysis and headache.  She was found to have acute respiratory failure with hypoxia, volume overload secondary to multiple missed dialysis  sessions, and was positive for COVID-19.         Discussion/plan: INR back to therapeutic today.  Case discussed with pharmacist.  Will hold off on any bridge if INR slightly low based on risk versus benefit assessment.  Continue plan with transfer when possible. Hold steroids and convalescent plasma.  Not felt to be beneficial as patient currently on room air.  Continue to encourage nighttime BiPAP.  Monitor glucose.  No further epistaxis or hemoptysis.  Transfer back to facility per case management as scheduled for Monday.   Repeat labs tomorrow.         Acute respiratory failure with hypoxia and hypercapnia    COVID-19 positive with pneumonia    Volume overload secondary to missed dialysis    Obstructive sleep apnea    Possible obesity hypoventilation syndrome    CT chest notable for findings consistent with pneumonia and volume overload.    Consulted infectious disease to assist with COVID management    Probiotic    Discontinued antibiotics    BiPAP for hypercapnia intermittently and with sleep.         Epistaxis causing hemoptysis with acute blood loss anemia    Supratherapeutic INR    Bleeding stopped, suspect epistaxis causing hemoptysis with patient coughing up blood.  Significant blood loss noted with epistaxis.  No GI bleed noted.  Rhino Rocket has been removed.  Monitor for further episodes and monitor anemia.  Transfuse if needed.         Fecal occult positives, hemorrhoids, anemia of chronic kidney disease and chronic disease:    Hemoglobin appears to be stabilizing.  Transfuse as needed.  No further bleeding noted.  Continue to monitor.         History of DVT on chronic coagulation with warfarin:    Dosing per pharmacy.  Hold initially due to supratherapeutic INR.         Essential hypertension:    Carvedilol dose decreased.  Hold as needed.         Hyperlipidemia: Stable.  Statin.         Diabetes: Monitor glucose.  Correction insulin.         End-stage renal disease on hemodialysis: Nephrology  following for dialysis.  Patient counseled on avoiding missed dialysis sessions         DVT Prophylaxis: Anticoagulation         Discharge planning: Pending improvement.         CODE STATUS:           Code Status and Medical Interventions:       Ordered at: 06/29/20 0804         Level Of Support Discussed With:             Patient         Code Status:             CPR         Medical Interventions (Level of Support Prior to Arrest):             Full                      Electronically signed by Trevon Briseno MD, 07/05/20, 08:18.                                               Current Facility-Administered Medications   Medication Dose Route Frequency Provider Last Rate Last Dose   • acetaminophen (TYLENOL) tablet 650 mg  650 mg Oral Q6H PRN Nanda Gonzalez DO   650 mg at 07/06/20 0403   • aspirin chewable tablet 81 mg  81 mg Oral Daily Nanda Gonzalez DO   81 mg at 07/05/20 0936   • atorvastatin (LIPITOR) tablet 80 mg  80 mg Oral Nightly Nanda Gonzalez DO   80 mg at 07/05/20 2101   • budesonide-formoterol (SYMBICORT) 160-4.5 MCG/ACT inhaler 2 puff  2 puff Inhalation BID - RT Nanda Gonzalez DO   2 puff at 07/06/20 0831   • calcitriol (ROCALTROL) capsule 0.25 mcg  0.25 mcg Oral Daily Nanda Gonzalez DO   0.25 mcg at 07/05/20 0936   • carvedilol (COREG) tablet 12.5 mg  12.5 mg Oral BID With Meals Trevno Briseno MD   12.5 mg at 07/04/20 1732   • dextrose (D50W) 25 g/ 50mL Intravenous Solution 25 g  25 g Intravenous Q15 Min PRN Nanda Gonzalez DO       • dextrose (GLUTOSE) oral gel 15 g  15 g Oral Q15 Min PRN Nanda Gonzalez, DO       • docusate sodium (COLACE) capsule 100 mg  100 mg Oral BID Nanda Gonzalez DO   100 mg at 07/05/20 2102   • glucagon (human recombinant) (GLUCAGEN DIAGNOSTIC) injection 1 mg  1 mg Subcutaneous Q15 Min PRN Nanda Gonzalez, DO       • heparin (porcine) injection 1,800 Units  1,800 Units Intracatheter PRN Bhaskar Trevino MD   1,800 Units at 07/03/20  0812   • HYDROcodone-acetaminophen (NORCO) 5-325 MG per tablet 1 tablet  1 tablet Oral Q4H PRN Nanda Gonzalez, DO   1 tablet at 07/06/20 0458   • Hydrocortisone (Perianal) (ANUSOL-HC) 2.5 % rectal cream   Rectal BID Nanda Gonzalez R, DO       • insulin lispro (humaLOG) injection 0-7 Units  0-7 Units Subcutaneous TID AC Nanda Gonzalez, DO   2 Units at 07/05/20 1252   • isosorbide mononitrate (IMDUR) 24 hr tablet 30 mg  30 mg Oral Daily Nanda Gonzalez, DO   30 mg at 07/05/20 0936   • nystatin (MYCOSTATIN) powder   Topical Q12H Nanda Gonzalez R, DO       • ondansetron (ZOFRAN) injection 4 mg  4 mg Intravenous Q6H PRN Nanda Gonzalez, DO   4 mg at 07/06/20 0124   • pantoprazole (PROTONIX) EC tablet 40 mg  40 mg Oral Q AM Nanda Gonzalez, DO   40 mg at 07/06/20 0458   • Pharmacy to Dose - Warfarin   Does not apply Continuous PRN Nanda Gonzalez R, DO       • polyethylene glycol (MIRALAX) packet 17 g  17 g Oral BID PRN Nanda Gonzalez R, DO       • saccharomyces boulardii (FLORASTOR) capsule 250 mg  250 mg Oral Daily Nanda Gonzalez R, DO   250 mg at 07/05/20 0936   • sertraline (ZOLOFT) tablet 50 mg  50 mg Oral Daily Nanda Gonzalez, DO   50 mg at 07/05/20 0936   • sevelamer (RENVELA) packet 0.8 g  800 mg Oral TID With Meals Nanda Gonzalez R, DO   0.8 g at 07/05/20 1734   • sodium chloride 0.9 % flush 10 mL  10 mL Intravenous Q12H Nanda Gonzalez, DO   10 mL at 07/05/20 2102   • sodium chloride 0.9 % flush 10 mL  10 mL Intravenous PRN Nanda Gonzalez R, DO       • warfarin (COUMADIN) tablet 4 mg  4 mg Oral Daily Usama Sharma RPH   4 mg at 07/05/20 1734

## 2020-07-06 NOTE — PLAN OF CARE
Problem: Patient Care Overview  Goal: Plan of Care Review  Outcome: Ongoing (interventions implemented as appropriate)  Flowsheets (Taken 7/6/2020 0041)  Progress: no change  Plan of Care Reviewed With: patient  Note:   VSS, except low BP during dialysis. Removed 1/2 L per report.  Pt states she feels better this evening. Covid test ( in- house) resent. Plan for discharge tomorrow. Pt has been on RA, o2 sats >90%. No SOA. Lortab given this am prn for pain. SR on the monitor.

## 2020-07-06 NOTE — PLAN OF CARE
Patient is a HD patient, dialysis (M,W,F), 2L NC, patient doesn't walk, she is up with lift, she has hemmorrhoids, she has an tayla cath right upper chest, fistula right arm no bp/sticks right arm, IV saline locked. Plan: HD today, Hopefully discharge back to [facility]. Will continue to monitor.

## 2020-07-06 NOTE — PLAN OF CARE
Problem: Hemodialysis (Adult)  Description  Prevent and manage potential problems includin. cardiovascular complications  2. electrolyte imbalance  3. fluid imbalance  4. hematologic complications  5. infection  6. nausea and vomiting  7. neurologic complications  8. process complications  9. situational response  10. vascular access complications  Goal: Signs and Symptoms of Listed Potential Problems Will be Absent, Minimized or Managed (Hemodialysis)  Description  Signs and symptoms of listed potential problems will be absent, minimized or managed by discharge/transition of care (reference Hemodialysis (Adult) CPG).  Outcome: Ongoing (interventions implemented as appropriate)  Flowsheets (Taken 2020 1155)  Problems Assessed (Hemodialysis): all  Problems Present (Hemodialysis): electrolyte imbalance; fluid imbalance; situational response; cardiovascular complications  Note:   HD today

## 2020-07-06 NOTE — PROGRESS NOTES
"   LOS: 7 days    Patient Care Team:  Goldy Dior MD as PCP - General (Internal Medicine)    Chief Complaint: Shortness of breath COVID-19 positive  Consulted for ESRD.  70-year-old female with ESRD on hemodialysis Monday Wednesday Friday, CAD, diabetes, history of DVT on Coumadin, history of CVA cerebellar, morbid obesity, hemorrhoids with anal fissures presented with more places and headache.  She has missed her dialysis earlier.  She was recently diagnosed with COVID-19   Subjective   No new events.    Comfortable in bed no obvious distress sleeping dialysis in progress.  Hypotensive on dialysis.    Review of Systems:   Patient seen from the outside the room comfortable, no new complaints    Objective     Vital Sign Min/Max for last 24 hours  Temp  Min: 96.4 °F (35.8 °C)  Max: 98.7 °F (37.1 °C)   BP  Min: 84/47  Max: 112/48   Pulse  Min: 73  Max: 81   Resp  Min: 18  Max: 20   SpO2  Min: 92 %  Max: 100 %   No data recorded   No data recorded     Flowsheet Rows      First Filed Value   Admission Height  162.6 cm (64\") Documented at 06/29/2020 0341   Admission Weight  (!) 147 kg (324 lb) Documented at 06/29/2020 0341          I/O this shift:  In: 370 [P.O.:120; IV Piggyback:250]  Out: -   I/O last 3 completed shifts:  In: 118 [P.O.:118]  Out: 0     Physical Exam:  Due to COVID-19: Patient's examinations received from the staff RN and dialysis nurse asymptomatic.  General Appearance: Laying comfortable in bed no obvious distress.  Neck: Supple as she is moving without any problem.  Lungs:Equal chest movement, nonlabored.  Heart: Regular  Abdomen: Obesity  Extremities: Positive edema according to the RN  Neuro: Moving all extremities.       WBC WBC   Date Value Ref Range Status   07/06/2020 8.08 3.40 - 10.80 10*3/mm3 Final      HGB Hemoglobin   Date Value Ref Range Status   07/06/2020 7.4 (L) 12.0 - 15.9 g/dL Final      HCT Hematocrit   Date Value Ref Range Status   07/06/2020 25.8 (L) 34.0 - 46.6 % Final    "   Platlets No results found for: LABPLAT   MCV MCV   Date Value Ref Range Status   07/06/2020 102.0 (H) 79.0 - 97.0 fL Final          Sodium Sodium   Date Value Ref Range Status   07/05/2020 137 136 - 145 mmol/L Final      Potassium Potassium   Date Value Ref Range Status   07/05/2020 4.9 3.5 - 5.2 mmol/L Final      Chloride Chloride   Date Value Ref Range Status   07/05/2020 96 (L) 98 - 107 mmol/L Final      CO2 CO2   Date Value Ref Range Status   07/05/2020 24.0 22.0 - 29.0 mmol/L Final      BUN BUN   Date Value Ref Range Status   07/05/2020 60 (H) 8 - 23 mg/dL Final      Creatinine Creatinine   Date Value Ref Range Status   07/05/2020 5.78 (H) 0.57 - 1.00 mg/dL Final      Calcium Calcium   Date Value Ref Range Status   07/05/2020 8.5 (L) 8.6 - 10.5 mg/dL Final      PO4 No results found for: CAPO4   Albumin No results found for: ALBUMIN   Magnesium No results found for: MG   Uric Acid No results found for: URICACID        Results Review:     I reviewed the patient's new clinical results.      aspirin 81 mg Oral Daily   atorvastatin 80 mg Oral Nightly   budesonide-formoterol 2 puff Inhalation BID - RT   calcitriol 0.25 mcg Oral Daily   carvedilol 12.5 mg Oral BID With Meals   docusate sodium 100 mg Oral BID   Hydrocortisone (Perianal)  Rectal BID   insulin lispro 0-7 Units Subcutaneous TID AC   isosorbide mononitrate 30 mg Oral Daily   nystatin  Topical Q12H   pantoprazole 40 mg Oral Q AM   saccharomyces boulardii 250 mg Oral Daily   sertraline 50 mg Oral Daily   sevelamer 800 mg Oral TID With Meals   sodium chloride 10 mL Intravenous Q12H   warfarin 4 mg Oral Daily       Pharmacy Consult        Medication Review: As above    Assessment/Plan   1.  ESRD dialysis Monday Wednesday Friday.  2.  Blood pressure: Monitoring closely with current medications.  3.  BMD: On phosphate binders  4.  Hypoxia: Monitor closely.  5.  Anemia of chronic kidney disease.  6.  Acute respiratory failure with hypoxia COVID-19 positive.  7.   Epistaxis with supratherapeutic INR.  Plan:  Patient received a carvedilol dose this morning blood pressure dropping on dialysis for ultrafiltration.  HD per [facility].  Orders written.  Transfuse at hb<7. Avoid KIRK in the setting of COVID-19 infection.   High risk patient with multiple medical problems    Irineo Latham MD  07/06/20  13:38

## 2020-07-07 LAB
ABO GROUP BLD: NORMAL
BLD GP AB SCN SERPL QL: NEGATIVE
DEPRECATED RDW RBC AUTO: 60.3 FL (ref 37–54)
ERYTHROCYTE [DISTWIDTH] IN BLOOD BY AUTOMATED COUNT: 16.3 % (ref 12.3–15.4)
GLUCOSE BLDC GLUCOMTR-MCNC: 134 MG/DL (ref 70–130)
GLUCOSE BLDC GLUCOMTR-MCNC: 140 MG/DL (ref 70–130)
GLUCOSE BLDC GLUCOMTR-MCNC: 161 MG/DL (ref 70–130)
GLUCOSE BLDC GLUCOMTR-MCNC: 207 MG/DL (ref 70–130)
HCT VFR BLD AUTO: 22.6 % (ref 34–46.6)
HGB BLD-MCNC: 6.8 G/DL (ref 12–15.9)
INR PPP: 2.69 (ref 0.85–1.16)
MCH RBC QN AUTO: 30.2 PG (ref 26.6–33)
MCHC RBC AUTO-ENTMCNC: 30.1 G/DL (ref 31.5–35.7)
MCV RBC AUTO: 100.4 FL (ref 79–97)
PLATELET # BLD AUTO: 216 10*3/MM3 (ref 140–450)
PMV BLD AUTO: 11.2 FL (ref 6–12)
PROTHROMBIN TIME: 28.3 SECONDS (ref 11.5–14)
RBC # BLD AUTO: 2.25 10*6/MM3 (ref 3.77–5.28)
RH BLD: POSITIVE
T&S EXPIRATION DATE: NORMAL
WBC # BLD AUTO: 7.01 10*3/MM3 (ref 3.4–10.8)

## 2020-07-07 PROCEDURE — 94799 UNLISTED PULMONARY SVC/PX: CPT

## 2020-07-07 PROCEDURE — 36430 TRANSFUSION BLD/BLD COMPNT: CPT

## 2020-07-07 PROCEDURE — 86901 BLOOD TYPING SEROLOGIC RH(D): CPT | Performed by: INTERNAL MEDICINE

## 2020-07-07 PROCEDURE — P9016 RBC LEUKOCYTES REDUCED: HCPCS

## 2020-07-07 PROCEDURE — 63710000001 INSULIN LISPRO (HUMAN) PER 5 UNITS: Performed by: INTERNAL MEDICINE

## 2020-07-07 PROCEDURE — 86850 RBC ANTIBODY SCREEN: CPT | Performed by: INTERNAL MEDICINE

## 2020-07-07 PROCEDURE — 82962 GLUCOSE BLOOD TEST: CPT

## 2020-07-07 PROCEDURE — 86923 COMPATIBILITY TEST ELECTRIC: CPT

## 2020-07-07 PROCEDURE — 25010000002 ONDANSETRON PER 1 MG: Performed by: INTERNAL MEDICINE

## 2020-07-07 PROCEDURE — 99233 SBSQ HOSP IP/OBS HIGH 50: CPT | Performed by: INTERNAL MEDICINE

## 2020-07-07 PROCEDURE — 85027 COMPLETE CBC AUTOMATED: CPT | Performed by: INTERNAL MEDICINE

## 2020-07-07 PROCEDURE — 86900 BLOOD TYPING SEROLOGIC ABO: CPT

## 2020-07-07 PROCEDURE — 86900 BLOOD TYPING SEROLOGIC ABO: CPT | Performed by: INTERNAL MEDICINE

## 2020-07-07 PROCEDURE — 85610 PROTHROMBIN TIME: CPT

## 2020-07-07 RX ORDER — WARFARIN SODIUM 3 MG/1
3 TABLET ORAL
Status: DISCONTINUED | OUTPATIENT
Start: 2020-07-07 | End: 2020-07-08

## 2020-07-07 RX ORDER — WARFARIN SODIUM 3 MG/1
3 TABLET ORAL NIGHTLY
Start: 2020-07-07 | End: 2020-07-08

## 2020-07-07 RX ORDER — BISACODYL 10 MG
10 SUPPOSITORY, RECTAL RECTAL ONCE
Status: COMPLETED | OUTPATIENT
Start: 2020-07-07 | End: 2020-07-07

## 2020-07-07 RX ORDER — ALBUMIN (HUMAN) 12.5 G/50ML
12.5 SOLUTION INTRAVENOUS AS NEEDED
Status: CANCELLED | OUTPATIENT
Start: 2020-07-08 | End: 2020-07-08

## 2020-07-07 RX ADMIN — HYDROCODONE BITARTRATE AND ACETAMINOPHEN 1 TABLET: 5; 325 TABLET ORAL at 10:26

## 2020-07-07 RX ADMIN — INSULIN LISPRO 2 UNITS: 100 INJECTION, SOLUTION INTRAVENOUS; SUBCUTANEOUS at 17:32

## 2020-07-07 RX ADMIN — NYSTATIN: 100000 POWDER TOPICAL at 20:07

## 2020-07-07 RX ADMIN — PANTOPRAZOLE SODIUM 40 MG: 40 TABLET, DELAYED RELEASE ORAL at 04:28

## 2020-07-07 RX ADMIN — WARFARIN SODIUM 3 MG: 3 TABLET ORAL at 17:32

## 2020-07-07 RX ADMIN — POLYETHYLENE GLYCOL 3350 17 G: 17 POWDER, FOR SOLUTION ORAL at 08:01

## 2020-07-07 RX ADMIN — DOCUSATE SODIUM 100 MG: 100 CAPSULE, LIQUID FILLED ORAL at 08:02

## 2020-07-07 RX ADMIN — ATORVASTATIN CALCIUM 80 MG: 40 TABLET, FILM COATED ORAL at 20:06

## 2020-07-07 RX ADMIN — Medication 250 MG: at 08:02

## 2020-07-07 RX ADMIN — ONDANSETRON 4 MG: 2 INJECTION INTRAMUSCULAR; INTRAVENOUS at 15:00

## 2020-07-07 RX ADMIN — HYDROCORTISONE: 25 CREAM TOPICAL at 20:07

## 2020-07-07 RX ADMIN — SEVELAMER CARBONATE 0.8 G: 800 POWDER, FOR SUSPENSION ORAL at 11:53

## 2020-07-07 RX ADMIN — SEVELAMER CARBONATE 0.8 G: 800 POWDER, FOR SUSPENSION ORAL at 17:32

## 2020-07-07 RX ADMIN — BUDESONIDE AND FORMOTEROL FUMARATE DIHYDRATE 2 PUFF: 160; 4.5 AEROSOL RESPIRATORY (INHALATION) at 07:37

## 2020-07-07 RX ADMIN — ISOSORBIDE MONONITRATE 30 MG: 30 TABLET, EXTENDED RELEASE ORAL at 08:02

## 2020-07-07 RX ADMIN — SERTRALINE 50 MG: 50 TABLET, FILM COATED ORAL at 08:02

## 2020-07-07 RX ADMIN — NYSTATIN: 100000 POWDER TOPICAL at 08:00

## 2020-07-07 RX ADMIN — SODIUM CHLORIDE, PRESERVATIVE FREE 10 ML: 5 INJECTION INTRAVENOUS at 08:01

## 2020-07-07 RX ADMIN — CARVEDILOL 12.5 MG: 12.5 TABLET, FILM COATED ORAL at 17:32

## 2020-07-07 RX ADMIN — HYDROCORTISONE: 25 CREAM TOPICAL at 08:00

## 2020-07-07 RX ADMIN — CALCITRIOL CAPSULES 0.25 MCG 0.25 MCG: 0.25 CAPSULE ORAL at 08:01

## 2020-07-07 RX ADMIN — INSULIN LISPRO 3 UNITS: 100 INJECTION, SOLUTION INTRAVENOUS; SUBCUTANEOUS at 11:53

## 2020-07-07 RX ADMIN — CARVEDILOL 12.5 MG: 12.5 TABLET, FILM COATED ORAL at 08:02

## 2020-07-07 RX ADMIN — ASPIRIN 81 MG CHEWABLE TABLET 81 MG: 81 TABLET CHEWABLE at 08:03

## 2020-07-07 RX ADMIN — SEVELAMER CARBONATE 0.8 G: 800 POWDER, FOR SUSPENSION ORAL at 08:02

## 2020-07-07 RX ADMIN — BISACODYL 10 MG: 10 SUPPOSITORY RECTAL at 08:02

## 2020-07-07 RX ADMIN — SODIUM CHLORIDE, PRESERVATIVE FREE 10 ML: 5 INJECTION INTRAVENOUS at 20:06

## 2020-07-07 RX ADMIN — BUDESONIDE AND FORMOTEROL FUMARATE DIHYDRATE 2 PUFF: 160; 4.5 AEROSOL RESPIRATORY (INHALATION) at 19:49

## 2020-07-07 NOTE — PROGRESS NOTES
[facility] Medicine Services  PROGRESS NOTE    Patient Name: Joy Bueno  : 1950  MRN: [id number]    Date of Admission: 2020  Primary Care Physician: Goldy Dior MD    Subjective   Subjective     CC:  Follow-up shortness of breath and COVID-19    HPI:  Feels better.  Wants to transfer today.  No further bleeding.  Feels well.    Review of Systems  No current fevers or chills  No current dysuria or hematuria  No current nausea, vomiting, or diarrhea  No current chest pain or palpitations      Objective   Objective     Vital Signs:   Temp:  [96.9 °F (36.1 °C)-98.8 °F (37.1 °C)] 98.8 °F (37.1 °C)  Heart Rate:  [73-91] 79  Resp:  [18-20] 20  BP: ()/(43-72) 142/64        Physical Exam:  Constitutional:Awake, alert  HENT: NCAT, mucous membranes moist, neck supple  Respiratory: Minimal cough, currently on room air, respiratory rate normal  Cardiovascular: RRR, normal radial pulses  Gastrointestinal: Positive bowel sounds, soft, nontender, nondistended  Musculoskeletal: Elderly and chronically debilitated in appearance, severe morbid obesity, some lower extremity edema, BMI 53  Psychiatric: Appropriate affect, cooperative, conversational  Neurologic: No slurred speech or facial droop, follows commands  Skin: No rashes or jaundice, warm      Results Reviewed:  Results from last 7 days   Lab Units 20  0546 20  0903 20  0525  20  0522 20  1406   WBC 10*3/mm3  --  8.08  --   --  6.46  --    HEMOGLOBIN g/dL  --  7.4*  --   --  7.9* 7.7*   HEMATOCRIT %  --  25.8*  --   --  29.6* 26.6*   PLATELETS 10*3/mm3  --  225  --   --  191  --    INR  2.69* 2.31* 2.04*   < >  --  3.65*    < > = values in this interval not displayed.     Results from last 7 days   Lab Units 20  0525 20  1146 20  1032   SODIUM mmol/L 137 136 144   POTASSIUM mmol/L 4.9 3.5 4.1   CHLORIDE mmol/L 96* 96* 103   CO2 mmol/L 24.0 26.0 29.0   BUN mg/dL 60* 23 33*    CREATININE mg/dL 5.78* 3.06* 4.34*   GLUCOSE mg/dL 200* 152* 164*   CALCIUM mg/dL 8.5* 8.6 9.0     Estimated Creatinine Clearance: 12.6 mL/min (A) (by C-G formula based on SCr of 5.78 mg/dL (H)).    Microbiology Results Abnormal     Procedure Component Value - Date/Time    COVID PRE-OP / PRE-PROCEDURE SCREENING ORDER (NO ISOLATION) - Swab, Nasopharynx [[id number]] Collected:  07/06/20 1300    Lab Status:  Final result Specimen:  Swab from Nasopharynx Updated:  07/06/20 1740    Narrative:       The following orders were created for panel order COVID PRE-OP / PRE-PROCEDURE SCREENING ORDER (NO ISOLATION) - Swab, Nasopharynx.  Procedure                               Abnormality         Status                     ---------                               -----------         ------                     COVID-19,[facility] IN-HOUSE...[[id number]]  Abnormal            Final result                 Please view results for these tests on the individual orders.    COVID-19,[facility]HOU[facility], NP SWAB IN TRANSPORT MEDIA 8-12 HR TAT - Swab, Nasopharynx [[id number]]  (Abnormal) Collected:  07/06/20 1300    Lab Status:  Final result Specimen:  Swab from Nasopharynx Updated:  07/06/20 1740     COVID19 Detected    Narrative:       Fact sheet for providers: https://www.fda.gov/media/665089/download     Fact sheet for patients: https://www.fda.gov/media/632406/download    Blood Culture - Blood, Arm, Left [[id number]] Collected:  06/29/20 0650    Lab Status:  Final result Specimen:  Blood from Arm, Left Updated:  07/04/20 0815     Blood Culture No growth at 5 days    Blood Culture - Blood, Arm, Left [[id number]]  (Abnormal) Collected:  06/29/20 0630    Lab Status:  Edited Result - FINAL Specimen:  Blood from Arm, Left Updated:  07/03/20 0608     Blood Culture Staphylococcus, coagulase negative     Comment: Probable contaminant requires clinical correlation, susceptibility not performed unless requested by physician.          Isolated from Aerobic  Bottle     Gram Stain Aerobic Bottle Gram positive cocci in pairs and clusters      Anaerobic Bottle Gram positive cocci in clusters     Comment: Appended report. These results have been appended to a previously final verified report.       Blood Culture ID, PCR - Blood, Arm, Left [[id number]]  (Abnormal) Collected:  06/29/20 0630    Lab Status:  Final result Specimen:  Blood from Arm, Left Updated:  06/30/20 0918     BCID, PCR Staphylococcus spp, not aureus. Identification by BCID PCR.    COVID-19,[facility]-HOU[facility], NP SWAB IN TRANSPORT MEDIA 8-12 HR TAT - Swab, Nasopharynx [[id number]]  (Abnormal) Collected:  06/29/20 0814    Lab Status:  Final result Specimen:  Swab from Nasopharynx Updated:  06/29/20 1345     COVID19 Detected    Narrative:       Fact sheet for providers: https://www.fda.gov/media/659736/download     Fact sheet for patients: https://www.fda.gov/media/916317/download    Respiratory Panel, PCR - Swab, Nasopharynx [[id number]]  (Normal) Collected:  06/29/20 0814    Lab Status:  Final result Specimen:  Swab from Nasopharynx Updated:  06/29/20 1029     ADENOVIRUS, PCR Not Detected     Coronavirus 229E Not Detected     Coronavirus HKU1 Not Detected     Coronavirus NL63 Not Detected     Coronavirus OC43 Not Detected     Human Metapneumovirus Not Detected     Human Rhinovirus/Enterovirus Not Detected     Influenza B PCR Not Detected     Parainfluenza Virus 1 Not Detected     Parainfluenza Virus 2 Not Detected     Parainfluenza Virus 3 Not Detected     Parainfluenza Virus 4 Not Detected     Bordetella pertussis pcr Not Detected     Influenza A H1 2009 PCR Not Detected     Chlamydophila pneumoniae PCR Not Detected     Mycoplasma pneumo by PCR Not Detected     Influenza A PCR Not Detected     Influenza A H3 Not Detected     Influenza A H1 Not Detected     RSV, PCR Not Detected     Bordetella parapertussis PCR Not Detected    Narrative:       The coronavirus on the RVP is NOT COVID-19 and is NOT indicative of  infection with COVID-19.           Imaging Results (Last 24 Hours)     ** No results found for the last 24 hours. **          Results for orders placed during the hospital encounter of 09/29/19   Adult Transthoracic Echo Complete W/ Cont if Necessary Per Protocol    Narrative · Estimated EF = 60%.  · Left ventricular systolic function is normal.          I have reviewed the medications:  Scheduled Meds:    aspirin 81 mg Oral Daily   atorvastatin 80 mg Oral Nightly   budesonide-formoterol 2 puff Inhalation BID - RT   calcitriol 0.25 mcg Oral Daily   carvedilol 12.5 mg Oral BID With Meals   docusate sodium 100 mg Oral BID   Hydrocortisone (Perianal)  Rectal BID   insulin lispro 0-7 Units Subcutaneous TID AC   isosorbide mononitrate 30 mg Oral Daily   nystatin  Topical Q12H   pantoprazole 40 mg Oral Q AM   saccharomyces boulardii 250 mg Oral Daily   sertraline 50 mg Oral Daily   sevelamer 800 mg Oral TID With Meals   sodium chloride 10 mL Intravenous Q12H   warfarin 3 mg Oral Daily     Continuous Infusions:    Pharmacy Consult      PRN Meds:.•  acetaminophen  •  albumin human  •  dextrose  •  dextrose  •  glucagon (human recombinant)  •  heparin (porcine)  •  HYDROcodone-acetaminophen  •  ondansetron  •  Pharmacy Consult  •  polyethylene glycol  •  sodium chloride    Assessment/Plan   Assessment & Plan     Active Hospital Problems    Diagnosis  POA   • **Pneumonia due to COVID-19 virus [U07.1, J12.89]  Yes   • Acute blood loss anemia [D62]  Yes   • Pneumonia [J18.9]  Yes   • Hemorrhoids [K64.9]  Yes   • Anticoagulated on Coumadin [Z79.01]  Not Applicable   • History of deep vein thrombosis (DVT) of brachial vein of left upper extremity (CMS/HCC) [I82.622]  Yes   • Chronic diastolic heart failure (CMS/HCC) [I50.32]  Yes   • ESRD (end stage renal disease) (CMS/[facility]) [N18.6]  Yes   • Essential hypertension [I10]  Yes   • Morbid obesity (CMS/[facility]) [E66.01]  Yes      Resolved Hospital Problems    Diagnosis Date Resolved POA    • Epistaxis [R04.0] 07/01/2020 Yes   • Supratherapeutic INR [R79.1] 07/06/2020 Yes   • Volume overload [E87.70] 07/06/2020 Yes   • Acute respiratory failure with hypoxia (CMS/HCC) [J96.01] 07/06/2020 Yes        Brief Hospital Course to date:  Joy Bueno is a 70 y.o. female who is a nursing home resident of [facility] with past medical history is significant for severe morbid obesity, end-stage renal disease on hemodialysis Monday Wednesday Friday, coronary artery disease, diabetes, chronic anticoagulation for history of DVT, stroke, obstructive sleep apnea, and chronic hemorrhoids and anal fissure who presents to [facility] with complaint of hemoptysis and headache.  She was found to have acute respiratory failure with hypoxia, volume overload secondary to multiple missed dialysis sessions, and was positive for COVID-19.    Discussion/plan: INR increased.  I discussed case with pharmacist who recommended adjust warfarin dose to 3 mg daily.  Patient much improved and breathing comfortably on room air.  Appears to be recovering rapidly from viral infection.  I spoke with nephrologist who recommended against erythropoietin for anemia right now in the setting of COVID,  Due to risk of clotting.  He states they will monitor blood counts outpatient and can transfuse in dialysis if needed.     Addendum: Discharge discharge canceled today.  Nursing noted further hemorrhoidal bleeding.  Anemia worse today.  Plan to transfuse 1 unit.  Consult gastroneurology for recommendations regarding hemorrhoids.  Trend hemoglobin.  Monitor for further bleeding.    Acute respiratory failure with hypoxia and hypercapnia  COVID-19 positive with pneumonia  Volume overload secondary to missed dialysis  Obstructive sleep apnea  Possible obesity hypoventilation syndrome  CT chest notable for findings consistent with pneumonia and volume overload.  Consulted infectious disease to assist with COVID  management  Probiotic  Discontinued antibiotics  BiPAP for hypercapnia intermittently and with sleep.    Epistaxis causing hemoptysis with acute blood loss anemia  Supratherapeutic INR  Bleeding stopped, suspect epistaxis causing hemoptysis with patient coughing up blood.  Significant blood loss noted with epistaxis.  No GI bleed noted.  Rhino Rocket has been removed.     Fecal occult positives, hemorrhoids, anemia of chronic kidney disease and chronic disease:  Hemoglobin appears to be stabilizing.  Transfuse as needed.  No further bleeding noted.      History of DVT on chronic coagulation with warfarin:  Dosing per pharmacy.  Held initially due to supratherapeutic INR.  Now restarted    Essential hypertension:  Carvedilol dose decreased.  Hold as needed.    Hyperlipidemia: Stable.  Statin.    Diabetes: Monitor glucose.  Correction insulin.    End-stage renal disease on hemodialysis: Nephrology following for dialysis.  Patient counseled on avoiding missed dialysis sessions    DVT Prophylaxis: Anticoagulation    Discharge planning: Pending improvement.    CODE STATUS:   Code Status and Medical Interventions:   Ordered at: 06/29/20 0804     Level Of Support Discussed With:    Patient     Code Status:    CPR     Medical Interventions (Level of Support Prior to Arrest):    Full         Electronically signed by Trevon Briseno MD, 07/07/20, 09:26.

## 2020-07-07 NOTE — PROGRESS NOTES
"   LOS: 8 days    Patient Care Team:  Goldy Dior MD as PCP - General (Internal Medicine)    Chief Complaint: Shortness of breath COVID-19 positive  Consulted for ESRD.  70-year-old female with ESRD on hemodialysis Monday Wednesday Friday, CAD, diabetes, history of DVT on Coumadin, history of CVA cerebellar, morbid obesity, hemorrhoids with anal fissures presented with more places and headache.  She has missed her dialysis earlier.  She was recently diagnosed with COVID-19   Subjective   No new events.  Patient's remain the same.  Comfortable no obvious distress seen from outside of the room       Review of Systems:   Patient seen from the outside the room comfortable, no new complaints according to the RN    Objective     Vital Sign Min/Max for last 24 hours  Temp  Min: 96.9 °F (36.1 °C)  Max: 98.8 °F (37.1 °C)   BP  Min: 107/57  Max: 147/57   Pulse  Min: 78  Max: 98   Resp  Min: 18  Max: 20   SpO2  Min: 91 %  Max: 100 %   No data recorded   Weight  Min: 135 kg (298 lb)  Max: 135 kg (298 lb)     Flowsheet Rows      First Filed Value   Admission Height  162.6 cm (64\") Documented at 06/29/2020 0341   Admission Weight  (!) 147 kg (324 lb) Documented at 06/29/2020 0341          No intake/output data recorded.  I/O last 3 completed shifts:  In: 920 [P.O.:360; I.V.:310; IV Piggyback:250]  Out: 1130 [Other:1130]    Physical Exam:  Due to COVID-19: Patient's examinations received from the staff RN and asymptomatic.  General Appearance: Laying comfortable in bed no obvious distress.  Morbidly obese -American female  Neck: Supple as she is moving without any problem.  Lungs:Equal chest movement, nonlabored.  Heart: Regular  Abdomen: Obesity  Extremities: Positive edema according to the RN  Neuro: Cannot be evaluated today      WBC WBC   Date Value Ref Range Status   07/07/2020 7.01 3.40 - 10.80 10*3/mm3 Final   07/06/2020 8.08 3.40 - 10.80 10*3/mm3 Final      HGB Hemoglobin   Date Value Ref Range Status "   07/07/2020 6.8 (C) 12.0 - 15.9 g/dL Final   07/06/2020 7.4 (L) 12.0 - 15.9 g/dL Final      HCT Hematocrit   Date Value Ref Range Status   07/07/2020 22.6 (L) 34.0 - 46.6 % Final   07/06/2020 25.8 (L) 34.0 - 46.6 % Final      Platlets No results found for: LABPLAT   MCV MCV   Date Value Ref Range Status   07/07/2020 100.4 (H) 79.0 - 97.0 fL Final   07/06/2020 102.0 (H) 79.0 - 97.0 fL Final          Sodium Sodium   Date Value Ref Range Status   07/05/2020 137 136 - 145 mmol/L Final      Potassium Potassium   Date Value Ref Range Status   07/05/2020 4.9 3.5 - 5.2 mmol/L Final      Chloride Chloride   Date Value Ref Range Status   07/05/2020 96 (L) 98 - 107 mmol/L Final      CO2 CO2   Date Value Ref Range Status   07/05/2020 24.0 22.0 - 29.0 mmol/L Final      BUN BUN   Date Value Ref Range Status   07/05/2020 60 (H) 8 - 23 mg/dL Final      Creatinine Creatinine   Date Value Ref Range Status   07/05/2020 5.78 (H) 0.57 - 1.00 mg/dL Final      Calcium Calcium   Date Value Ref Range Status   07/05/2020 8.5 (L) 8.6 - 10.5 mg/dL Final      PO4 No results found for: CAPO4   Albumin No results found for: ALBUMIN   Magnesium No results found for: MG   Uric Acid No results found for: URICACID        Results Review:     I reviewed the patient's new clinical results.      aspirin 81 mg Oral Daily   atorvastatin 80 mg Oral Nightly   budesonide-formoterol 2 puff Inhalation BID - RT   calcitriol 0.25 mcg Oral Daily   carvedilol 12.5 mg Oral BID With Meals   docusate sodium 100 mg Oral BID   Hydrocortisone (Perianal)  Rectal BID   insulin lispro 0-7 Units Subcutaneous TID AC   isosorbide mononitrate 30 mg Oral Daily   nystatin  Topical Q12H   pantoprazole 40 mg Oral Q AM   saccharomyces boulardii 250 mg Oral Daily   sertraline 50 mg Oral Daily   sevelamer 800 mg Oral TID With Meals   sodium chloride 10 mL Intravenous Q12H   warfarin 3 mg Oral Daily       Pharmacy Consult        Medication Review: As above    Assessment/Plan   1.   ESRD dialysis Monday Wednesday Friday.  2.  Blood pressure: Monitoring closely with current medications.  3.  BMD: On phosphate binders  4.  Hypoxia: Monitor closely.  5.  Anemia of chronic kidney disease.  6.  Acute respiratory failure with hypoxia COVID-19 positive.  7.  Epistaxis with supratherapeutic INR.  Plan:  Patient received a carvedilol dose this morning blood pressure dropping on dialysis for ultrafiltration.  [place] [facility]  Orders written.  Transfuse at hb<7.   Avoid KIRK in the setting of COVID-19 infection.   High risk patient with multiple medical problems    Irineo Latham MD  07/07/20  13:40

## 2020-07-07 NOTE — PLAN OF CARE
VSS; patient pleasant and cooperative during the evening; calling out for little things through the night- encouraged to practice self-care as much as possible; SR; RA through the shift; denied pain; continue to monitor and support the patient as we prepare her for discharge.

## 2020-07-07 NOTE — PROGRESS NOTES
"Pharmacy Consult - Warfarin    Joy Bueno is a 70 y.o. female on warfarin therapy.    Height - 162.6 cm (64\")  Weight - 135 kg (298 lb)    Consulting Provider: Nanda Gonzalez DO  Indication: Hx of DVT  Goal INR: 2-3  Home Regimen: warfarin 6mg daily    Bridge Therapy: none    Drug-Drug Interactions with current regimen:  Pantoprazole - may increase INR    Warfarin Dosing During Admission:    Date  6/29 6/30 7/1 7/2 7/3 7/4 7/5 7/6 7/7   INR  3.97 4.64 3.65 3.6 2.18 1.81 2.04 2.31 2.69   Dose  0 0 0 0  5mg 5mg 4mg 4mg 3mg     Education Provided: Warfarin education provided on 7/3/20 in writing.    Discharge Follow up:   Following Provider -  [facility]   Follow up time range or appointment - 2-3 days following discharge    Labs:    Results from last 7 days   Lab Units 07/07/20  0546 07/06/20  0903 07/05/20  0525 07/04/20  0955 07/03/20  1146 07/02/20  1032 07/02/20  0522 07/01/20  1406 07/01/20  0254 06/30/20  1615   INR  2.69* 2.31* 2.04* 1.81* 2.18* 3.60*  --  3.65*  --   --    HEMOGLOBIN g/dL  --  7.4*  --   --   --   --  7.9* 7.7* 7.9* 7.7*   HEMATOCRIT %  --  25.8*  --   --   --   --  29.6* 26.6* 25.8* 25.5*     Results from last 7 days   Lab Units 07/05/20  0525 07/03/20  1146 07/02/20  1032   SODIUM mmol/L 137 136 144   POTASSIUM mmol/L 4.9 3.5 4.1   CHLORIDE mmol/L 96* 96* 103   CO2 mmol/L 24.0 26.0 29.0   BUN mg/dL 60* 23 33*   CREATININE mg/dL 5.78* 3.06* 4.34*   CALCIUM mg/dL 8.5* 8.6 9.0   GLUCOSE mg/dL 200* 152* 164*     Current dietary intake: 0-75% of documented meals  Diet Order   Procedures    Diet Regular; Cardiac, Consistent Carbohydrate, Renal     Assessment/Plan:  Warfarin dosing for hx DVT  Goal INR: 2-3  7/7 INR - 2.69, increased from 2.31  7/6 H/H - 7.4/25.8    Decrease to warfarin 3mg daily   Monitor s/s bleeding, dietary intake, clinical status and drug-drug interactions  Follow daily INR and adjust dose accordingly    Thanks  Andrez Orantes, [facility] "   7/7/2020  08:24

## 2020-07-07 NOTE — PROGRESS NOTES
Nutrition Services    Patient Name:  Joy Bueno  YOB: 1950  MRN: [id number]  Admit Date:  6/29/2020                      Clinical Nutrition     Nutrition Assessment  Reason for Visit:   LOS      Patient Name: Joy Bueno  YOB: 1950  MRN: [id number]  Date of Encounter: 07/06/20 21:30  Admission date: 6/29/2020      Comments: Pt rpt doing well w food.      Nutrition Assessment       Hospital Problem List    Pneumonia due to COVID-19 virus    Essential hypertension    Morbid obesity (CMS/[facility])    ESRD (end stage renal disease) (CMS/[facility])    Chronic diastolic heart failure (CMS/[facility])    History of deep vein thrombosis (DVT) of brachial vein of left upper extremity (CMS/[facility])    Anticoagulated on Coumadin    Pneumonia    Hemorrhoids    Acute blood loss anemia   Volume overload    Applicable PMH/PSxH:     PMH: She  has a past medical history of Acute on chronic diastolic heart failure (CMS/[facility]), Acute on chronic heart failure (CMS/[facility]), Acute respiratory failure with hypercapnia (CMS/[facility]) (4/27/2019), Anemia, Anxiety, Asthma, Bilateral leg pain (10/4/2017), Boil, Chest wall abscess (4/5/2019), CHF (congestive heart failure) (CMS/[facility]), Chronic diastolic heart failure (CMS/[facility]), Chronic kidney disease, Constipation, Coronary artery disease, Diabetes mellitus (CMS/[facility]), Diabetes mellitus, type II (CMS/[facility]) (8/3/2018), Dialysis patient (CMS/[facility]), Dizzy, DVT of upper extremity (deep vein thrombosis) (CMS/[facility]), Elevated troponin (9/1/2019), Encephalopathy, metabolic (4/27/2019), Fecal impaction of rectum (CMS/[facility]), Generalized weakness (7/31/2019), GERD (gastroesophageal reflux disease), Headache, History of Clostridium difficile infection (08/03/2018), History of respiratory failure, since off home oxygen (8/3/2018), History of UTI (8/3/2018), Hyperkalemia (7/31/2019), Hypertension, Morbid obesity (CMS/[facility]), Osteoarthritis, Sleep apnea, Slow transit constipation (2/20/2019), Tinea capitis  "(8/3/2018), URI (upper respiratory infection) (2019), Urinary tract infection due to extended-spectrum beta lactamase (ESBL)-producing Klebsiella (2019), Urinary tract infection without hematuria (2019), UTI (urinary tract infection), bacterial (10/4/2017), Vertigo, and Volume overload (2019).   PSxH: She  has a past surgical history that includes  section; Esophagogastroduodenoscopy (N/A, 2017); Colonoscopy (N/A, 2017); Hemodialysis Catheter (Right, 10/17/2018); arteriovenous fistula/shunt surgery (Left, 10/22/2018); Arm Lesion/Cyst Excision (N/A, 2019); Cataract extraction; Hernia repair; Tonsillectomy; and arteriovenous fistula/shunt surgery (Right, 11/15/2019).     Other: Hemorrhoids w anal fissure    Reported/Observed/Food/Nutrition Related History:     By phone pt rpt doing well w food. RN to deliver menu so that pt will have reference when called for choices.      Anthropometrics     Height: 162.6 cm (64\")  Last filed wt: Weight: (!) 141 kg (311 lb 8 oz) (20 0100)  Weight Method: Bed scale    BMI: BMI (Calculated): 53.4  Obese Class III extreme obesity: > or equal to 40kg/m2    Ideal Body Weight (IBW) (kg): 55      Labs reviewed   Yes      Medications reviewed   Pertinent: insulin colace protonix probiotic coumadin remvela rocaltrol      Current Nutrition Prescription     PO: Diet Regular; Cardiac, Consistent Carbohydrate, Renal  No active supplement orders      Intake: 75% of 87 meals.      Nutrition Diagnosis       Problem No nutrition diagnosis at this time   Etiology    Signs/Symptoms          Nutrition Intervention     1.  Follow treatment progress, Care plan reviewed, Advise alternate selection, Menu provided Telephoned pt and menu given to RN to deliver to pt.      Goal:   General: Nutrition support treatment  PO: Maintain intake      Monitoring/Evaluation:   Per protocol, PO intake, Pertinent labs, Symptoms          Briseyda Rogers RD,   Time Spent: 20 " min         Electronically signed by:  Briseyda Rogers RD  07/06/20 21:29

## 2020-07-07 NOTE — PROGRESS NOTES
Consult not done.  Nothing to add apart from Sitz bath TID.    Had colonoscopy 2017 with grade 1 hemorrhoids and sigmoid diverticulosis.    Would rec refer to colorectal surgeons after COVID is resolved

## 2020-07-07 NOTE — PROGRESS NOTES
Continued Stay Note  [facility]     Patient Name: Joy Bueno  MRN: [id number]  Today's Date: 7/7/2020    Admit Date: 6/29/2020    Discharge Plan     Row Name 07/07/20 1326       Plan    Plan  [facility]    Patient/Family in Agreement with Plan  yes    Plan Comments  Discussed patient in am rounds.  Patient not medically ready for discharge.  [facility] ambulance TENTATIVELY scheduled for tomorrow, 7/8 at 1500.  Updated Keshawn at [facility].  CM will continue to follow.  Megha Tam RN [phone]    Final Discharge Disposition Code  03 - skilled nursing facility (SNF)        Discharge Codes    No documentation.       Expected Discharge Date and Time     Expected Discharge Date Expected Discharge Time    Jul 7, 2020             Megha Tam RN

## 2020-07-07 NOTE — PLAN OF CARE
Problem: Patient Care Overview  Goal: Plan of Care Review  Outcome: Ongoing (interventions implemented as appropriate)  Flowsheets  Taken 7/7/2020 1848  Progress: no change  Taken 7/7/2020 0830  Plan of Care Reviewed With: patient  Note:   VSS. RA, o2 sats upper 90's.  Pt's was scheduled to be discharged this am, drop in H/H. Pt had little bleeding while wiped. Several BM's today. Pt frequently calls out. Lortab given this am. Pt reported being nauseous once this afternoon. IV zofran given.  HD scheduled for tomorrow. Pt receiving 1 unit of pRBC.

## 2020-07-07 NOTE — PROGRESS NOTES
Case Management Discharge Note      Final Note: Patient's plan is a skilled bed at [facility] today, 7/7.  Ambulance scheduled for 1100.  Nurse to call report to [phone].  CM will fax transfer summary when available to [phone].  Please send a copy of the transfer summary and any scripts in the packet to be sent to the facility with the patient.  Megha Tam,RN x.[phone]         Destination - Selection Complete      Service Provider Request Status Selected Services Address Phone Number Fax Number    [facility] Selected Skilled Nursing [address] [id number] [phone]      Durable Medical Equipment      No service has been selected for the patient.      Dialysis/Infusion      Service Provider Request Status Selected Services Address Phone Number Fax Number    [facility] Selected Dialysis [address], [place] [id number] [phone]      Home Medical Care      No service has been selected for the patient.      Therapy      No service has been selected for the patient.      Community Resources      No service has been selected for the patient.        Transportation Services  Ambulance: [facility]    Final Discharge Disposition Code: 03 - skilled nursing facility (SNF)

## 2020-07-08 ENCOUNTER — APPOINTMENT (OUTPATIENT)
Dept: NEPHROLOGY | Facility: HOSPITAL | Age: 70
End: 2020-07-08

## 2020-07-08 VITALS
SYSTOLIC BLOOD PRESSURE: 101 MMHG | BODY MASS INDEX: 50.02 KG/M2 | RESPIRATION RATE: 18 BRPM | TEMPERATURE: 97.8 F | WEIGHT: 293 LBS | OXYGEN SATURATION: 96 % | HEIGHT: 64 IN | HEART RATE: 75 BPM | DIASTOLIC BLOOD PRESSURE: 58 MMHG

## 2020-07-08 LAB
ANION GAP SERPL CALCULATED.3IONS-SCNC: 13 MMOL/L (ref 5–15)
BH BB BLOOD EXPIRATION DATE: NORMAL
BH BB BLOOD TYPE BARCODE: 7300
BH BB DISPENSE STATUS: NORMAL
BH BB PRODUCT CODE: NORMAL
BH BB UNIT NUMBER: NORMAL
BUN SERPL-MCNC: 67 MG/DL (ref 8–23)
BUN/CREAT SERPL: 11.1 (ref 7–25)
CALCIUM SPEC-SCNC: 8.9 MG/DL (ref 8.6–10.5)
CHLORIDE SERPL-SCNC: 95 MMOL/L (ref 98–107)
CO2 SERPL-SCNC: 24 MMOL/L (ref 22–29)
CREAT SERPL-MCNC: 6.06 MG/DL (ref 0.57–1)
CROSSMATCH INTERPRETATION: NORMAL
DEPRECATED RDW RBC AUTO: 60.8 FL (ref 37–54)
ERYTHROCYTE [DISTWIDTH] IN BLOOD BY AUTOMATED COUNT: 17 % (ref 12.3–15.4)
GFR SERPL CREATININE-BSD FRML MDRD: 8 ML/MIN/1.73
GFR SERPL CREATININE-BSD FRML MDRD: ABNORMAL ML/MIN/{1.73_M2}
GLUCOSE BLDC GLUCOMTR-MCNC: 166 MG/DL (ref 70–130)
GLUCOSE SERPL-MCNC: 146 MG/DL (ref 65–99)
HCT VFR BLD AUTO: 26.8 % (ref 34–46.6)
HGB BLD-MCNC: 8.3 G/DL (ref 12–15.9)
INR PPP: 3.03 (ref 0.85–1.16)
MCH RBC QN AUTO: 30 PG (ref 26.6–33)
MCHC RBC AUTO-ENTMCNC: 31 G/DL (ref 31.5–35.7)
MCV RBC AUTO: 96.8 FL (ref 79–97)
PLATELET # BLD AUTO: 217 10*3/MM3 (ref 140–450)
PMV BLD AUTO: 10.8 FL (ref 6–12)
POTASSIUM SERPL-SCNC: 5.1 MMOL/L (ref 3.5–5.2)
PROTHROMBIN TIME: 31.1 SECONDS (ref 11.5–14)
RBC # BLD AUTO: 2.77 10*6/MM3 (ref 3.77–5.28)
SODIUM SERPL-SCNC: 132 MMOL/L (ref 136–145)
UNIT  ABO: NORMAL
UNIT  RH: NORMAL
WBC # BLD AUTO: 7.81 10*3/MM3 (ref 3.4–10.8)

## 2020-07-08 PROCEDURE — 63710000001 INSULIN LISPRO (HUMAN) PER 5 UNITS: Performed by: INTERNAL MEDICINE

## 2020-07-08 PROCEDURE — 82962 GLUCOSE BLOOD TEST: CPT

## 2020-07-08 PROCEDURE — 94799 UNLISTED PULMONARY SVC/PX: CPT

## 2020-07-08 PROCEDURE — 25010000002 ONDANSETRON PER 1 MG: Performed by: INTERNAL MEDICINE

## 2020-07-08 PROCEDURE — 80048 BASIC METABOLIC PNL TOTAL CA: CPT | Performed by: INTERNAL MEDICINE

## 2020-07-08 PROCEDURE — 85027 COMPLETE CBC AUTOMATED: CPT | Performed by: INTERNAL MEDICINE

## 2020-07-08 PROCEDURE — 5A1D70Z PERFORMANCE OF URINARY FILTRATION, INTERMITTENT, LESS THAN 6 HOURS PER DAY: ICD-10-PCS | Performed by: INTERNAL MEDICINE

## 2020-07-08 PROCEDURE — 85610 PROTHROMBIN TIME: CPT

## 2020-07-08 RX ORDER — WARFARIN SODIUM 2 MG/1
2 TABLET ORAL NIGHTLY
Start: 2020-07-08 | End: 2021-02-17 | Stop reason: HOSPADM

## 2020-07-08 RX ORDER — WARFARIN SODIUM 2 MG/1
2 TABLET ORAL
Status: DISCONTINUED | OUTPATIENT
Start: 2020-07-08 | End: 2020-07-08 | Stop reason: HOSPADM

## 2020-07-08 RX ADMIN — PANTOPRAZOLE SODIUM 40 MG: 40 TABLET, DELAYED RELEASE ORAL at 05:58

## 2020-07-08 RX ADMIN — CALCITRIOL CAPSULES 0.25 MCG 0.25 MCG: 0.25 CAPSULE ORAL at 11:43

## 2020-07-08 RX ADMIN — ASPIRIN 81 MG CHEWABLE TABLET 81 MG: 81 TABLET CHEWABLE at 12:00

## 2020-07-08 RX ADMIN — Medication 250 MG: at 11:44

## 2020-07-08 RX ADMIN — HYDROCORTISONE: 25 CREAM TOPICAL at 11:42

## 2020-07-08 RX ADMIN — ONDANSETRON 4 MG: 2 INJECTION INTRAMUSCULAR; INTRAVENOUS at 02:19

## 2020-07-08 RX ADMIN — ISOSORBIDE MONONITRATE 30 MG: 30 TABLET, EXTENDED RELEASE ORAL at 11:43

## 2020-07-08 RX ADMIN — INSULIN LISPRO 2 UNITS: 100 INJECTION, SOLUTION INTRAVENOUS; SUBCUTANEOUS at 12:36

## 2020-07-08 RX ADMIN — SERTRALINE 50 MG: 50 TABLET, FILM COATED ORAL at 11:43

## 2020-07-08 RX ADMIN — CARVEDILOL 12.5 MG: 12.5 TABLET, FILM COATED ORAL at 11:43

## 2020-07-08 RX ADMIN — SEVELAMER CARBONATE 0.8 G: 800 POWDER, FOR SUSPENSION ORAL at 11:42

## 2020-07-08 RX ADMIN — BUDESONIDE AND FORMOTEROL FUMARATE DIHYDRATE 2 PUFF: 160; 4.5 AEROSOL RESPIRATORY (INHALATION) at 08:39

## 2020-07-08 RX ADMIN — NYSTATIN: 100000 POWDER TOPICAL at 11:42

## 2020-07-08 NOTE — PROGRESS NOTES
"Pharmacy Consult - Warfarin    Joy Bueno is a 70 y.o. female on warfarin therapy.    Height - 162.6 cm (64\")  Weight - 135 kg (298 lb)    Consulting Provider: Nanda Gonzalez DO  Indication: Hx of DVT  Goal INR: 2-3  Home Regimen: warfarin 6mg daily    Bridge Therapy: none    Drug-Drug Interactions with current regimen:  Pantoprazole - may increase INR    Warfarin Dosing During Admission:    Date  6/29 6/30 7/1 7/2 7/3 7/4 7/5 7/6 7/7   INR  3.97 4.64 3.65 3.6 2.18 1.81 2.04 2.31 2.69   Dose  0 0 0 0  5mg 5mg 4mg 4mg 3mg     Date  7/8           INR  3.03           Dose  2mg             Education Provided: Warfarin education provided on 7/3/20 in writing.    Discharge Follow up:   Following Provider -  [facility]   Follow up time range or appointment - 2-3 days following discharge    Labs:    Results from last 7 days   Lab Units 07/08/20  0809 07/07/20  0547 07/07/20  0546 07/06/20  0903 07/05/20  0525 07/04/20  0955 07/03/20  1146 07/02/20  1032 07/02/20  0522 07/01/20  1406   INR  3.03*  --  2.69* 2.31* 2.04* 1.81* 2.18* 3.60*  --  3.65*   HEMOGLOBIN g/dL 8.3* 6.8*  --  7.4*  --   --   --   --  7.9* 7.7*   HEMATOCRIT % 26.8* 22.6*  --  25.8*  --   --   --   --  29.6* 26.6*     Results from last 7 days   Lab Units 07/08/20  0809 07/05/20  0525 07/03/20  1146   SODIUM mmol/L 132* 137 136   POTASSIUM mmol/L 5.1 4.9 3.5   CHLORIDE mmol/L 95* 96* 96*   CO2 mmol/L 24.0 24.0 26.0   BUN mg/dL 67* 60* 23   CREATININE mg/dL 6.06* 5.78* 3.06*   CALCIUM mg/dL 8.9 8.5* 8.6   GLUCOSE mg/dL 146* 200* 152*     Current dietary intake: 7/7 not documented   Diet Order   Procedures    Diet Regular; Cardiac, Consistent Carbohydrate, Renal     Assessment/Plan:  Warfarin dosing for hx DVT  Goal INR: 2-3  7/8 INR - 3.03, increased from 2.69  7/8 H/H - 8.3/26.8    Decrease to warfarin 2mg daily   Monitor s/s bleeding, dietary intake, clinical status and drug-drug interactions  Follow daily INR and adjust dose " accordingly    Thanks  Andrez Orantes, [facility]   7/8/2020  09:32

## 2020-07-08 NOTE — PROGRESS NOTES
[facility] Medicine Services  PROGRESS NOTE    Patient Name: Joy Bueno  : 1950  MRN: [id number]    Date of Admission: 2020  Primary Care Physician: Goldy Dior MD    Subjective   Subjective     CC:  Follow-up shortness of breath and COVID-19    HPI:  Patient feels well today.  No further hemorrhoidal bleeding.  Agrees with plan for sitz bath.  No new respiratory symptoms or other complaints.  Feels better after blood transfusion.      Review of Systems  No current fevers or chills  No current dysuria or hematuria  No current nausea, vomiting, or diarrhea  No current chest pain or palpitations      Objective   Objective     Vital Signs:   Temp:  [96.6 °F (35.9 °C)-98.2 °F (36.8 °C)] 96.9 °F (36.1 °C)  Heart Rate:  [68-98] 70  Resp:  [14-19] 19  BP: (128-153)/(52-75) 152/60        Physical Exam:  Constitutional:Awake, alert  HENT: NCAT, mucous membranes moist, neck supple  Respiratory: Minimal cough, currently on room air, respiratory rate normal  Cardiovascular: RRR, normal radial pulses  Gastrointestinal: Positive bowel sounds, soft, nontender, nondistended  Musculoskeletal: Elderly and chronically debilitated in appearance, severe morbid obesity, some lower extremity edema, BMI 53  Psychiatric: Appropriate affect, cooperative, conversational  Neurologic: No slurred speech or facial droop, follows commands  Skin: No rashes or jaundice, warm      Results Reviewed:  Results from last 7 days   Lab Units 20  0809 20  0547 20  0546 20  0903 20  0525   WBC 10*3/mm3 7.81 7.01  --  8.08  --    HEMOGLOBIN g/dL 8.3* 6.8*  --  7.4*  --    HEMATOCRIT % 26.8* 22.6*  --  25.8*  --    PLATELETS 10*3/mm3 217 216  --  225  --    INR   --   --  2.69* 2.31* 2.04*     Results from last 7 days   Lab Units 20  0525 20  1146 20  1032   SODIUM mmol/L 137 136 144   POTASSIUM mmol/L 4.9 3.5 4.1   CHLORIDE mmol/L 96* 96* 103   CO2 mmol/L 24.0  26.0 29.0   BUN mg/dL 60* 23 33*   CREATININE mg/dL 5.78* 3.06* 4.34*   GLUCOSE mg/dL 200* 152* 164*   CALCIUM mg/dL 8.5* 8.6 9.0     Estimated Creatinine Clearance: 12.6 mL/min (A) (by C-G formula based on SCr of 5.78 mg/dL (H)).    Microbiology Results Abnormal     Procedure Component Value - Date/Time    COVID PRE-OP / PRE-PROCEDURE SCREENING ORDER (NO ISOLATION) - Swab, Nasopharynx [[id number]] Collected:  07/06/20 1300    Lab Status:  Final result Specimen:  Swab from Nasopharynx Updated:  07/06/20 1740    Narrative:       The following orders were created for panel order COVID PRE-OP / PRE-PROCEDURE SCREENING ORDER (NO ISOLATION) - Swab, Nasopharynx.  Procedure                               Abnormality         Status                     ---------                               -----------         ------                     COVID-19,[facility] IN[facility]HOUSE...[[id number]]  Abnormal            Final result                 Please view results for these tests on the individual orders.    COVID-19,[facility] IN[facility]HOU[facility], NP SWAB IN TRANSPORT MEDIA 8-12 HR TAT - Swab, Nasopharynx [[id number]]  (Abnormal) Collected:  07/06/20 1300    Lab Status:  Final result Specimen:  Swab from Nasopharynx Updated:  07/06/20 1740     COVID19 Detected    Narrative:       Fact sheet for providers: https://www.fda.gov/media/982977/download     Fact sheet for patients: https://www.fda.gov/media/267235/download    Blood Culture - Blood, Arm, Left [[id number]] Collected:  06/29/20 0650    Lab Status:  Final result Specimen:  Blood from Arm, Left Updated:  07/04/20 0815     Blood Culture No growth at 5 days    Blood Culture - Blood, Arm, Left [[id number]]  (Abnormal) Collected:  06/29/20 0630    Lab Status:  Edited Result - FINAL Specimen:  Blood from Arm, Left Updated:  07/03/20 0608     Blood Culture Staphylococcus, coagulase negative     Comment: Probable contaminant requires clinical correlation, susceptibility not performed unless requested by  physician.          Isolated from Aerobic Bottle     Gram Stain Aerobic Bottle Gram positive cocci in pairs and clusters      Anaerobic Bottle Gram positive cocci in clusters     Comment: Appended report. These results have been appended to a previously final verified report.       Blood Culture ID, PCR - Blood, Arm, Left [[id number]]  (Abnormal) Collected:  06/29/20 0630    Lab Status:  Final result Specimen:  Blood from Arm, Left Updated:  06/30/20 0918     BCID, PCR Staphylococcus spp, not aureus. Identification by BCID PCR.    COVID-19,[facility]HOU[facility], NP SWAB IN TRANSPORT MEDIA 8-12 HR TAT - Swab, Nasopharynx [[id number]]  (Abnormal) Collected:  06/29/20 0814    Lab Status:  Final result Specimen:  Swab from Nasopharynx Updated:  06/29/20 1345     COVID19 Detected    Narrative:       Fact sheet for providers: https://www.fda.gov/media/910268/download     Fact sheet for patients: https://www.fda.gov/media/470774/download    Respiratory Panel, PCR - Swab, Nasopharynx [[id number]]  (Normal) Collected:  06/29/20 0814    Lab Status:  Final result Specimen:  Swab from Nasopharynx Updated:  06/29/20 1029     ADENOVIRUS, PCR Not Detected     Coronavirus 229E Not Detected     Coronavirus HKU1 Not Detected     Coronavirus NL63 Not Detected     Coronavirus OC43 Not Detected     Human Metapneumovirus Not Detected     Human Rhinovirus/Enterovirus Not Detected     Influenza B PCR Not Detected     Parainfluenza Virus 1 Not Detected     Parainfluenza Virus 2 Not Detected     Parainfluenza Virus 3 Not Detected     Parainfluenza Virus 4 Not Detected     Bordetella pertussis pcr Not Detected     Influenza A H1 2009 PCR Not Detected     Chlamydophila pneumoniae PCR Not Detected     Mycoplasma pneumo by PCR Not Detected     Influenza A PCR Not Detected     Influenza A H3 Not Detected     Influenza A H1 Not Detected     RSV, PCR Not Detected     Bordetella parapertussis PCR Not Detected    Narrative:       The coronavirus on the RVP is  NOT COVID-19 and is NOT indicative of infection with COVID-19.           Imaging Results (Last 24 Hours)     ** No results found for the last 24 hours. **          Results for orders placed during the hospital encounter of 09/29/19   Adult Transthoracic Echo Complete W/ Cont if Necessary Per Protocol    Narrative · Estimated EF = 60%.  · Left ventricular systolic function is normal.          I have reviewed the medications:  Scheduled Meds:    aspirin 81 mg Oral Daily   atorvastatin 80 mg Oral Nightly   budesonide-formoterol 2 puff Inhalation BID - RT   calcitriol 0.25 mcg Oral Daily   carvedilol 12.5 mg Oral BID With Meals   docusate sodium 100 mg Oral BID   Hydrocortisone (Perianal)  Rectal BID   insulin lispro 0-7 Units Subcutaneous TID AC   isosorbide mononitrate 30 mg Oral Daily   nystatin  Topical Q12H   pantoprazole 40 mg Oral Q AM   saccharomyces boulardii 250 mg Oral Daily   sertraline 50 mg Oral Daily   sevelamer 800 mg Oral TID With Meals   sodium chloride 10 mL Intravenous Q12H   warfarin 3 mg Oral Daily     Continuous Infusions:    Pharmacy Consult      PRN Meds:.•  acetaminophen  •  dextrose  •  dextrose  •  glucagon (human recombinant)  •  heparin (porcine)  •  HYDROcodone-acetaminophen  •  ondansetron  •  Pharmacy Consult  •  polyethylene glycol  •  sodium chloride    Assessment/Plan   Assessment & Plan     Active Hospital Problems    Diagnosis  POA   • **Pneumonia due to COVID-19 virus [U07.1, J12.89]  Yes   • Acute blood loss anemia [D62]  Yes   • Pneumonia [J18.9]  Yes   • Bleeding hemorrhoids [K64.9]  Yes   • Anticoagulated on Coumadin [Z79.01]  Not Applicable   • History of deep vein thrombosis (DVT) of brachial vein of left upper extremity (CMS/[facility]) [I82.622]  Yes   • Chronic diastolic heart failure (CMS/[facility]) [I50.32]  Yes   • ESRD (end stage renal disease) (CMS/[facility]) [N18.6]  Yes   • Essential hypertension [I10]  Yes   • Morbid obesity (CMS/[facility]) [E66.01]  Yes      Resolved Hospital Problems     Diagnosis Date Resolved POA   • Epistaxis [R04.0] 07/01/2020 Yes   • Supratherapeutic INR [R79.1] 07/06/2020 Yes   • Volume overload [E87.70] 07/06/2020 Yes   • Acute respiratory failure with hypoxia (CMS/HCC) [J96.01] 07/06/2020 Yes        Brief Hospital Course to date:  Joy Bueno is a 70 y.o. female who is a nursing home resident of [facility] with past medical history is significant for severe morbid obesity, end-stage renal disease on hemodialysis Monday Wednesday Friday, coronary artery disease, diabetes, chronic anticoagulation for history of DVT, stroke, obstructive sleep apnea, and chronic hemorrhoids and anal fissure who presents to [facility] with complaint of hemoptysis and headache.  She was found to have acute respiratory failure with hypoxia, volume overload secondary to multiple missed dialysis sessions, and was positive for COVID-19.    Discussion/plan: Awaiting INR today.  Plan for transfer back to facility.  Hemorrhoids are no longer bleeding.  Gastroenterology discussed the case over the phone with me and are recommending Sitz bath TID for 1 week with Anusol suppository.  They also recommend outpatient evaluation from colorectal surgery.  I discussed with the patient and she states she will discuss this with her primary care physician at follow-up and have him refer her.    Addendum: Also of note we have been unable to reach Dr. Goldy Dior, patient's primary care provider on the telephone however our  has left a message with his  regarding the need for follow-up imaging recommended on discharge summary per radiology.  We have also sent a copy of the discharge summary to his office to make sure he has this available.  I have reiterated the renal cyst noted on CT scan and the radiologist recommendation for follow-up to the patient again today and she again agrees to discuss this with her primary care and knows radiology recommends further  CT imaging.  She states this will not be a problem.    .Time: I spent  33 minutes on this discharge activity which included: face-to-face encounter with the patient, reviewing the data in the system, coordination of the care with the nursing staff as well as consultants, documentation, and entering orders.        Acute respiratory failure with hypoxia and hypercapnia  COVID-19 positive with pneumonia  Volume overload secondary to missed dialysis  Obstructive sleep apnea  Possible obesity hypoventilation syndrome  CT chest notable for findings consistent with pneumonia and volume overload.  Consulted infectious disease to assist with COVID management  Probiotic  Discontinued antibiotics  BiPAP for hypercapnia intermittently and with sleep.    Epistaxis causing hemoptysis with acute blood loss anemia  Supratherapeutic INR  Bleeding stopped, suspect epistaxis causing hemoptysis with patient coughing up blood.  Significant blood loss noted with epistaxis.  No GI bleed noted.  Rhino Rocket has been removed.     Fecal occult positives, hemorrhoids, anemia of chronic kidney disease and chronic disease:  Hemoglobin appears to be stabilizing.  Transfuse as needed.  No further bleeding noted.      History of DVT on chronic coagulation with warfarin:  Dosing per pharmacy.  Held initially due to supratherapeutic INR.  Now restarted    Essential hypertension:  Carvedilol dose decreased.  Hold as needed.    Hyperlipidemia: Stable.  Statin.    Diabetes: Monitor glucose.  Correction insulin.    End-stage renal disease on hemodialysis: Nephrology following for dialysis.  Patient counseled on avoiding missed dialysis sessions    DVT Prophylaxis: Anticoagulation    Discharge planning: Pending improvement.    CODE STATUS:   Code Status and Medical Interventions:   Ordered at: 06/29/20 0804     Level Of Support Discussed With:    Patient     Code Status:    CPR     Medical Interventions (Level of Support Prior to Arrest):    Full          Electronically signed by Trevon Briseno MD, 07/08/20, 08:47.

## 2020-07-08 NOTE — PLAN OF CARE
Problem: Hemodialysis (Adult)  Description  Prevent and manage potential problems includin. cardiovascular complications  2. electrolyte imbalance  3. fluid imbalance  4. hematologic complications  5. infection  6. nausea and vomiting  7. neurologic complications  8. process complications  9. situational response  10. vascular access complications  Goal: Signs and Symptoms of Listed Potential Problems Will be Absent, Minimized or Managed (Hemodialysis)  Description  Signs and symptoms of listed potential problems will be absent, minimized or managed by discharge/transition of care (reference Hemodialysis (Adult) [facility]).  Outcome: Ongoing (interventions implemented as appropriate)  Flowsheets (Taken 2020 8132)  Problems Assessed (Hemodialysis): all  Problems Present (Hemodialysis): electrolyte imbalance; fluid imbalance; hematologic complications  Note:   HD today

## 2020-07-08 NOTE — DISCHARGE PLACEMENT REQUEST
"Pam Loyd (70 y.o. Female)     Megha Tam, RN  [phone]    Date of Birth Social Security Number Address Home Phone MRN    1950  2020 [facility] [address]  [place] [id number] [phone] [id number]    Nondenominational Marital Status          None        Admission Date Admission Type Admitting Provider Attending Provider Department, Room/Bed    6/29/20 Emergency Trevon Briseno MD Furlow, Stephen Matthew, MD [facility] 6A, N615/1    Discharge Date Discharge Disposition Discharge Destination         Skilled Nursing Facility (DC - External)              Attending Provider:  Trevon Briseno MD    Allergies:  Geodon [Ziprasidone Hcl], Haldol [Haloperidol Lactate], Seroquel [Quetiapine Fumarate]    Isolation:  Contact Air   Infection:  COVID (confirmed) (06/29/20)   Code Status:  CPR    Ht:  162.6 cm (64\")   Wt:  135 kg (298 lb)    Admission Cmt:  None   Principal Problem:  Pneumonia due to COVID-19 virus [U07.1,J12.89]                 Active Insurance as of 6/29/2020     Primary Coverage     Payor Plan Insurance Group Employer/Plan Group    MEDICARE MEDICARE A & B      Payor Plan Address Payor Plan Phone Number Payor Plan Fax Number Effective Dates    [address] [phone]  5/1/2016 - None Entered    [facility] [address]       Subscriber Name Subscriber Birth Date Member ID       PAM LOYD 1950 [id number]           Secondary Coverage     Payor Plan Insurance Group Employer/Plan Group    [place]      Payor Plan Address Payor Plan Phone Number Payor Plan Fax Number Effective Dates    [address] [phone]  8/2/2018 - None Entered    [place] [address]       Subscriber Name Subscriber Birth Date Member ID       PAM LOYD 1950 [id number]                 Emergency Contacts      (Rel.) Home Phone Work Phone Mobile Phone    Tessie Dorado (Daughter) [phone] -- [phone]               "   Discharge Summary      Trevon Briseno MD at 20 0756              [facility]  DISCHARGE SUMMARY    Patient Name: Joy Bueno  : 1950  MRN: [id number]    Date of Admission: 2020  3:34 AM  Date of Discharge:  2020  Primary Care Physician: Goldy Dior MD    Consults     Date and Time Order Name Status Description    2020 1346 Inpatient Infectious Diseases Consult Completed     2020 0655 Inpatient Nephrology Consult            Hospital Course     Presenting Problem:   Pneumonia [J18.9]  Epistaxis [R04.0]    Active Hospital Problems    Diagnosis  POA   • **Pneumonia due to COVID-19 virus [U07.1, J12.89]  Yes   • Acute blood loss anemia [D62]  Yes   • Pneumonia [J18.9]  Yes   • Bleeding hemorrhoids [K64.9]  Yes   • Anticoagulated on Coumadin [Z79.01]  Not Applicable   • History of deep vein thrombosis (DVT) of brachial vein of left upper extremity (CMS/[facility]) [I82.622]  Yes   • Chronic diastolic heart failure (CMS/[facility]) [I50.32]  Yes   • ESRD (end stage renal disease) (CMS/[facility]) [N18.6]  Yes   • Essential hypertension [I10]  Yes   • Morbid obesity (CMS/[facility]) [E66.01]  Yes      Resolved Hospital Problems    Diagnosis Date Resolved POA   • Epistaxis [R04.0] 2020 Yes   • Supratherapeutic INR [R79.1] 2020 Yes   • Volume overload [E87.70] 2020 Yes   • Acute respiratory failure with hypoxia (CMS/[facility]) [J96.01] 2020 Yes          Hospital Course:  Joy Bueno is a 70 y.o. female who is a nursing home resident of [facility] with past medical history is significant for severe morbid obesity, end-stage renal disease on hemodialysis , coronary artery disease, diabetes, chronic anticoagulation for history of DVT, stroke, obstructive sleep apnea, and chronic hemorrhoids and anal fissure who presents to [facility] with complaint of hemoptysis and headache.  She was found to have  acute respiratory failure with hypoxia, volume overload secondary to multiple missed dialysis sessions, and was positive for COVID-19.    Patient received dialysis for volume overload and hypoxia resolved.  It appears that her respiratory failure was related to volume status much more significantly than COVID-19.  She is currently breathing comfortably on room air oxygen and has minimal COVID-19 symptoms at this time.  It was determined after discussing with infectious disease that convalescent plasma and steroids were not thought to be beneficial in her case based on a risk versus benefit assessment of these experimental treatments.  She has now stabilized and is felt to be ready to transfer back to her facility where she can continue to receive dialysis and supportive care while she recovers from her hospitalization.     Acute respiratory failure with hypoxia and hypercapnia  COVID-19 positive with pneumonia  Volume overload secondary to missed dialysis  Obstructive sleep apnea  Possible obesity hypoventilation syndrome  CT chest notable for findings consistent with pneumonia and volume overload.  Consulted infectious disease to assist with COVID management  Probiotic  Discontinued antibiotics  BiPAP for hypercapnia intermittently and with sleep.     Epistaxis causing hemoptysis with acute blood loss anemia  Supratherapeutic INR  Bleeding stopped, suspect epistaxis causing hemoptysis with patient coughing up blood from the nosebleed.     All bleeding has been stopped and no further signs of bleeding for several days now.  Early in the hospital stay, significant blood loss noted with epistaxis but has resolved and hemoglobin stabilized.  No GI bleed noted.  Rhino Rocket has been removed.       Fecal occult positives, hemorrhoids, anemia of chronic kidney disease and chronic disease:  Source of bleeding was felt to be mostly due to epistaxis however patient did have some slight oozing from hemorrhoids.  She was  placed on suppositories which resolved hemorrhoid symptoms.  Hemoglobin appears to be stabilizing.  No further bleeding noted.     Recommend further outpatient work-up if patient has further issues regarding hemorrhoids or anemia.     History of DVT on chronic coagulation with warfarin:  Dosing per pharmacy.     Held initially due to supratherapeutic INR.  Warfarin has been restarted at a lower dose of 2 mg daily, recommend careful monitoring at facility.     Essential hypertension:  Carvedilol dose decreased.     Monitor blood pressure and adjust further as needed     Hyperlipidemia: Stable.  Statin.     Diabetes: Glucose monitored and patient received correction insulin     End-stage renal disease on hemodialysis: Nephrology following for dialysis.  Patient counseled on avoiding missed dialysis sessions    Probable renal cysts: CT scan below shows probable renal cyst however radiologist stated patient could consider having repeat study with renal protocol.  I explained to patient the radiologist recommendation and she agreed to discuss with her primary care at follow-up.  We will send a copy of discharge summary to patient's primary care provider so they can discuss whether patient wishes to pursue further imaging.       At the time of discharge patient was told to take all medications as prescribed, keep all follow-up appointments, and call their doctor or return to the hospital with any worsening or concerning symptoms.    Please note that dragon voice recognition software was used to create this note and that transcription errors are possible.    Addendum: Transfer delayed from 7/6 to 7/7 as facility requested repeat COVID test which was again positive.  Patient remains stable for transfer, however discharge canceled again on 7/7 due to anemia.  Warfarin decreased further today to 3 mg.  Recommend INR check tomorrow and continue hydration per facility pharmacist or provider.  Dosing discussed with [facility]acist today.  I discussed his case with nephrologist who recommended to hold off on erythropoietin despite anemia.  He is concerned that this dose of medication would not be appropriate while patient is covered positive due to risk of thrombosis.  He states that they can transfuse her during dialysis if needed however patient currently asymptomatic despite anemia.      Addendum: Transfer was delayed again from 7/6 to 7/8 as CBC came back with worsening anemia and patient received 1 unit blood transfusion.  Patient had bleeding hemorrhoids in the setting of anticoagulation which is now stopped.  Gastroenterology was consulted and recommended sitz bath 3 times daily for 1 week as well as continuing Anusol suppository.  They recommend outpatient evaluation from colorectal surgery once her COVID-19 has resolved.    Discharge Follow Up Recommendations for outpatient labs/diagnostics:  Patient will need INR check at facility on Wednesday with goal INR 2-3, recommend to adjust further per facility provider, please call with any questions otherwise.  Recommend primary care follow-up within 1 week or facility provider follow-up within 1 week.    Day of Discharge     HPI:   Breathing stable.  No longer feeling short of breath.  Awaiting dialysis today.  Requesting discharge today.    Review of Systems  No current fevers or chills  No current dysuria or hematuria  No current nausea, vomiting, or diarrhea  No current chest pain or palpitations    Vital Signs:   Temp:  [96.6 °F (35.9 °C)-98.2 °F (36.8 °C)] 96.9 °F (36.1 °C)  Heart Rate:  [68-98] 69  Resp:  [14-19] 19  BP: (128-153)/(52-75) 141/53     Physical Exam:  Constitutional:Awake, alert  HENT: NCAT, mucous membranes moist, neck supple  Respiratory: Minimal cough, currently on room air, respiratory rate normal  Cardiovascular: RRR, normal radial pulses  Gastrointestinal: Positive bowel sounds, soft, nontender, nondistended  Musculoskeletal: Elderly and chronically  debilitated in appearance, severe morbid obesity, some lower extremity edema, BMI 53  Psychiatric: Appropriate affect, cooperative, conversational  Neurologic: No slurred speech or facial droop, follows commands  Skin: No rashes or jaundice, warm       Pertinent  and/or Most Recent Results     Results from last 7 days   Lab Units 07/08/20  0809 07/07/20  0547 07/06/20  0903 07/05/20  0525 07/03/20  1146 07/02/20  1032 07/02/20  0522 07/01/20  1406   WBC 10*3/mm3 7.81 7.01 8.08  --   --   --  6.46  --    HEMOGLOBIN g/dL 8.3* 6.8* 7.4*  --   --   --  7.9* 7.7*   HEMATOCRIT % 26.8* 22.6* 25.8*  --   --   --  29.6* 26.6*   PLATELETS 10*3/mm3 217 216 225  --   --   --  191  --    SODIUM mmol/L 132*  --   --  137 136 144  --  140   POTASSIUM mmol/L 5.1  --   --  4.9 3.5 4.1  --  3.7   CHLORIDE mmol/L 95*  --   --  96* 96* 103  --  101   CO2 mmol/L 24.0  --   --  24.0 26.0 29.0  --  29.0   BUN mg/dL 67*  --   --  60* 23 33*  --  25*   CREATININE mg/dL 6.06*  --   --  5.78* 3.06* 4.34*  --  2.85*   GLUCOSE mg/dL 146*  --   --  200* 152* 164*  --  131*   CALCIUM mg/dL 8.9  --   --  8.5* 8.6 9.0  --  8.7     Results from last 7 days   Lab Units 07/08/20  0809 07/07/20  0546 07/06/20  0903 07/05/20  0525 07/04/20  0955 07/03/20  1146 07/02/20  1032   PROTIME Seconds 31.1* 28.3* 25.1* 22.7* 20.7* 23.9* 35.7*   INR  3.03* 2.69* 2.31* 2.04* 1.81* 2.18* 3.60*           Invalid input(s): TG, LDLCALC, LDLREALC        Brief Urine Lab Results  (Last result in the past 365 days)      Color   Clarity   Blood   Leuk Est   Nitrite   Protein   CREAT   Urine HCG        06/29/20 0425 Red Turbid Large (3+) Large (3+) Negative >=300 mg/dL (3+)               Microbiology Results Abnormal     Procedure Component Value - Date/Time    COVID PRE-OP / PRE-PROCEDURE SCREENING ORDER (NO ISOLATION) - Swab, Nasopharynx [[id number]] Collected:  07/06/20 1300    Lab Status:  Final result Specimen:  Swab from Nasopharynx Updated:  07/06/20 3159     Narrative:       The following orders were created for panel order COVID PRE-OP / PRE-PROCEDURE SCREENING ORDER (NO ISOLATION) - Swab, Nasopharynx.  Procedure                               Abnormality         Status                     ---------                               -----------         ------                     COVID-19,[facility] IN[facility]HOUSE...[[id number]]  Abnormal            Final result                 Please view results for these tests on the individual orders.    COVID-19,[facility] IN[facility]HOUSE, NP SWAB IN TRANSPORT MEDIA 8-12 HR TAT - Swab, Nasopharynx [[id number]]  (Abnormal) Collected:  07/06/20 1300    Lab Status:  Final result Specimen:  Swab from Nasopharynx Updated:  07/06/20 1740     COVID19 Detected    Narrative:       Fact sheet for providers: https://www.fda.gov/media/292945/download     Fact sheet for patients: https://www.fda.gov/media/127473/download    Blood Culture - Blood, Arm, Left [[id number]] Collected:  06/29/20 0650    Lab Status:  Final result Specimen:  Blood from Arm, Left Updated:  07/04/20 0815     Blood Culture No growth at 5 days    Blood Culture - Blood, Arm, Left [[id number]]  (Abnormal) Collected:  06/29/20 0630    Lab Status:  Edited Result - FINAL Specimen:  Blood from Arm, Left Updated:  07/03/20 0608     Blood Culture Staphylococcus, coagulase negative     Comment: Probable contaminant requires clinical correlation, susceptibility not performed unless requested by physician.          Isolated from Aerobic Bottle     Gram Stain Aerobic Bottle Gram positive cocci in pairs and clusters      Anaerobic Bottle Gram positive cocci in clusters     Comment: Appended report. These results have been appended to a previously final verified report.       Blood Culture ID, PCR - Blood, Arm, Left [[id number]]  (Abnormal) Collected:  06/29/20 0630    Lab Status:  Final result Specimen:  Blood from Arm, Left Updated:  06/30/20 0918     BCID, PCR Staphylococcus spp, not aureus. Identification by BCID  PCR.    COVID-19,[facility]HOU[facility], NP SWAB IN TRANSPORT MEDIA 8-12 HR TAT - Swab, Nasopharynx [[id number]]  (Abnormal) Collected:  06/29/20 0814    Lab Status:  Final result Specimen:  Swab from Nasopharynx Updated:  06/29/20 1345     COVID19 Detected    Narrative:       Fact sheet for providers: https://www.fda.gov/media/839569/download     Fact sheet for patients: https://www.fda.gov/media/933352/download    Respiratory Panel, PCR - Swab, Nasopharynx [[id number]]  (Normal) Collected:  06/29/20 0814    Lab Status:  Final result Specimen:  Swab from Nasopharynx Updated:  06/29/20 1029     ADENOVIRUS, PCR Not Detected     Coronavirus 229E Not Detected     Coronavirus HKU1 Not Detected     Coronavirus NL63 Not Detected     Coronavirus OC43 Not Detected     Human Metapneumovirus Not Detected     Human Rhinovirus/Enterovirus Not Detected     Influenza B PCR Not Detected     Parainfluenza Virus 1 Not Detected     Parainfluenza Virus 2 Not Detected     Parainfluenza Virus 3 Not Detected     Parainfluenza Virus 4 Not Detected     Bordetella pertussis pcr Not Detected     Influenza A H1 2009 PCR Not Detected     Chlamydophila pneumoniae PCR Not Detected     Mycoplasma pneumo by PCR Not Detected     Influenza A PCR Not Detected     Influenza A H3 Not Detected     Influenza A H1 Not Detected     RSV, PCR Not Detected     Bordetella parapertussis PCR Not Detected    Narrative:       The coronavirus on the RVP is NOT COVID-19 and is NOT indicative of infection with COVID-19.           Imaging Results (All)     Procedure Component Value Units Date/Time    CT Abdomen Pelvis Without Contrast [[id number]] Collected:  06/29/20 0534     Updated:  06/29/20 0536    Narrative:       CT Abdomen Pelvis WO    INDICATION:   Rectal bleeding and vaginal bleeding that started this morning.    TECHNIQUE:   CT of the abdomen and pelvis without IV contrast. Coronal and sagittal reconstructions were obtained.  Radiation dose reduction techniques  included automated exposure control or exposure modulation based on body size. Count of known CT and cardiac nuc  med studies performed in previous 12 months: 2.     COMPARISON:   4/27/2019    FINDINGS:  For description of findings in the lung bases, see chest CT report dictated separately. There is some artifact due to body habitus and the patient's arms. Gallbladder is within normal limits. There is atherosclerotic disease, but there is no aortic  aneurysm. There is bilateral renal cortical thinning. Numerous bilateral exophytic renal lesions are again seen. These are likely cysts but are incompletely characterized. No renal or ureteral stones are seen. There is no hydronephrosis. Remaining solid  organs are grossly normal. The uterus is enlarged. Contains some calcifications that are likely the result of fibroids. Urinary bladder is decompressed and poorly evaluated. There is colonic diverticulosis without evidence of acute diverticulitis. The  appendix is not clearly identified, but there is no evidence of acute appendicitis. No small bowel obstruction is seen. Again seen is a ventral wall hernia just above the previously placed mesh. This contains abdominal fat and some fluid, similar in  appearance to the prior study.      Impression:         1. Technically degraded exam as above.  2. Colonic diverticulosis without definite diverticulitis. No bowel obstruction is seen.  3. Uterine enlargement with probable fibroids.  4. No renal or ureteral stones. No hydronephrosis.  5. Bilateral renal lesions, probably all cysts. However, these are incompletely characterized. Consider nonemergent follow-up with renal protocol CT or MRI with and without contrast when clinically feasible.  6. Additional findings as above.          Signer Name: Ricardo Pate MD   Signed: 6/29/2020 5:34 AM   Workstation Name: [email or web address]    [facility]    CT Chest Without Contrast [[id number]] Collected:  06/29/20 0527      Updated:  06/29/20 0529    Narrative:       CT Chest WO    INDICATION:   Fever and cough. Prior history of positive COVID 19 test.    TECHNIQUE:   CT of the thorax without IV contrast. Coronal and sagittal reconstructions were obtained.  Radiation dose reduction techniques included automated exposure control or exposure modulation based on body size. Count of known CT and cardiac nuc med studies  performed in previous 12 months: 2.     COMPARISON:   4/27/2019     FINDINGS:  The heart is enlarged. No pericardial effusion. There are trace bilateral pleural effusions. There are a few mildly enlarged mediastinal lymph nodes, but no adenopathy by size criteria is seen. Lung window images are degraded by motion. There is some  atelectasis in the lower lobes. There is also septal thickening in the lungs, right greater than left. There is patchy groundglass opacity throughout the right lung and in the left lower lung. Findings may reflect pulmonary edema, but pneumonitis is not  excluded. Imaging features can be seen with COVID-19 pneumonia, although are nonspecific and can can occur with a variety of infectious and noninfectious processes. Dialysis catheter tip is in the lower SVC. For description of findings in the upper  abdomen, see abdomen and pelvis CT report dictated separately.      Impression:         1. Bilateral infiltrates as above concerning for asymmetric pulmonary edema. Pneumonia not completely excluded. Imaging features can be seen with COVID-19 pneumonia, although are nonspecific and can can occur with a variety of infectious and  noninfectious processes.  2. Trace bilateral pleural effusions.  3. Cardiomegaly.    Signer Name: Ricardo Pate MD   Signed: 6/29/2020 5:27 AM   Workstation Name: [email or web address]    [facility]    CT Head Without Contrast [[id number]] Collected:  06/29/20 0523     Updated:  06/29/20 0525    Narrative:       CT Head WO    HISTORY:   Bleeding from the nose and  ears this morning.    TECHNIQUE:   Axial unenhanced head CT with multiplanar reformats. Radiation dose reduction techniques included automated exposure control or exposure modulation based on body size. Count of known CT and cardiac nuc med studies performed in previous 12 months: 2.     COMPARISON:   10/14/2019    FINDINGS:   Exam is motion degraded. There is generalized atrophy. Ventricular size and configuration are stable. Chronic small vessel ischemic changes are present in the white matter. Old bilateral cerebellar infarcts are present, right larger than left.  No acute  infarct or hemorrhage is seen. There is a mucous retention cyst in the left maxillary sinus. There is mucosal thickening in the nasal cavity and in the ethmoid air cells. Right mastoid effusion is present. No skull fracture. Atherosclerotic  calcifications are present in the carotid siphons.      Impression:         1. Motion degraded exam, but no definite acute findings in the brain.  2. Mucosal thickening in the ethmoid air cells and in the nasal cavity. There is also a right mastoid effusion.    Signer Name: Ricardo Pate MD   Signed: 6/29/2020 5:23 AM   Workstation Name: [email or web address]    [facility]          Results for orders placed during the hospital encounter of 09/29/19   Duplex Carotid Ultrasound CAR    Narrative · Right internal carotid artery stenosis of 0-49%.  · Left internal carotid artery stenosis of 0-49%.          Results for orders placed during the hospital encounter of 09/29/19   Duplex Carotid Ultrasound CAR    Narrative · Right internal carotid artery stenosis of 0-49%.  · Left internal carotid artery stenosis of 0-49%.          Results for orders placed during the hospital encounter of 09/29/19   Adult Transthoracic Echo Complete W/ Cont if Necessary Per Protocol    Narrative · Estimated EF = 60%.  · Left ventricular systolic function is normal.            Discharge Details        Discharge  Medications      New Medications      Instructions Start Date   Hydrocortisone (Perianal) 2.5 % rectal cream  Commonly known as:  ANUSOL-HC   Rectal, Daily PRN      PHARMACY CONSULT   Does not apply, Continuous PRN         Changes to Medications      Instructions Start Date   carvedilol 12.5 MG tablet  Commonly known as:  COREG  What changed:    · medication strength  · how much to take   12.5 mg, Oral, 2 Times Daily With Meals      Cetirizine HCl 10 MG capsule  Commonly known as:  ZyrTEC Allergy  What changed:    · when to take this  · reasons to take this   10 mg, Oral, Daily PRN      HYDROcodone-acetaminophen 5-325 MG per tablet  Commonly known as:  NORCO  What changed:  Another medication with the same name was removed. Continue taking this medication, and follow the directions you see here.   1 tablet, Oral, Every 8 Hours PRN      polyethylene glycol 17 g packet  Commonly known as:  MIRALAX  What changed:    · medication strength  · when to take this  · reasons to take this   17 g, Oral, 2 Times Daily PRN      promethazine 25 MG tablet  Commonly known as:  PHENERGAN  What changed:    · when to take this  · reasons to take this   25 mg, Oral, 3 Times Weekly PRN, On dialysis days (Mon Weds Fri)       warfarin 2 MG tablet  Commonly known as:  COUMADIN  What changed:    · medication strength  · how much to take  · when to take this  · additional instructions   2 mg, Oral, Nightly         Continue These Medications      Instructions Start Date   acetaminophen 325 MG tablet  Commonly known as:  TYLENOL   650 mg, Oral, Every 6 Hours PRN      albuterol sulfate  (90 Base) MCG/ACT inhaler  Commonly known as:  PROVENTIL HFA;VENTOLIN HFA;PROAIR HFA   2 puffs, Inhalation, Every 4 Hours PRN      aspirin 81 MG chewable tablet   81 mg, Oral, Daily      atorvastatin 80 MG tablet  Commonly known as:  LIPITOR   80 mg, Oral, Nightly      calcitriol 0.25 MCG capsule  Commonly known as:  ROCALTROL   0.25 mcg, Oral, Daily       docusate sodium 100 MG capsule  Commonly known as:  Colace   100 mg, Oral, 2 Times Daily      fluticasone 50 MCG/ACT nasal spray  Commonly known as:  FLONASE   2 sprays, Nasal, Daily      fluticasone-salmeterol 500-50 MCG/DOSE DISKUS  Commonly known as:  ADVAIR   1 puff, Inhalation, 2 Times Daily - RT      insulin aspart 100 UNIT/ML solution pen-injector sc pen  Commonly known as:  novoLOG FLEXPEN   5 Units, Subcutaneous, 3 Times Daily With Meals      ipratropium-albuterol 0.5-2.5 mg/3 ml nebulizer  Commonly known as:  DUO-NEB   3 mL, Nebulization, Every 6 Hours PRN      isosorbide mononitrate 30 MG 24 hr tablet  Commonly known as:  IMDUR   30 mg, Oral, Daily      Lidocaine 2 % gel   Apply externally, 3 Times Daily PRN      meclizine 12.5 MG tablet  Commonly known as:  ANTIVERT   12.5 mg, Oral, 3 Times Daily PRN      melatonin 3 MG tablet   3 mg, Oral, Nightly      neomycin-polymyxin-hydrocortisone 3.5-43826-1 otic solution  Commonly known as:  CORTISPORIN   3 drops, Both Ears, 3 Times Daily      ondansetron 4 MG tablet  Commonly known as:  ZOFRAN   4 mg, Oral, Every 8 Hours PRN      pantoprazole 40 MG EC tablet  Commonly known as:  PROTONIX   40 mg, Oral, Daily      Preparation H 0.25-88.44 % suppository suppository  Generic drug:  phenylephrine-cocoa Butter   Rectal, As Needed      saccharomyces boulardii 250 MG capsule  Commonly known as:  FLORASTOR   250 mg, Oral, Daily      sertraline 50 MG tablet  Commonly known as:  ZOLOFT   50 mg, Oral, Daily      sevelamer 800 MG tablet  Commonly known as:  RENVELA   800 mg, Oral, 3 Times Daily With Meals, 0500, 1200 & 1700      Tab-A-Juana tablet   1 tablet, Oral, Daily      vitamin D 1.25 MG (88524 UT) capsule capsule  Commonly known as:  ERGOCALCIFEROL   50,000 Units, Oral, Weekly, TAKES ON WEDNESDAYS         Stop These Medications    diltiaZEM (CARDIZEM) 2% cream            Please also note we are recommending patient have sitz bath 3 times daily for 1 week in addition  to medications as per above for recently bleeding hemorrhoids.      Allergies   Allergen Reactions   • Geodon [Ziprasidone Hcl] Unknown (See Comments)     PT DOESN'T REMEMBER   • Haldol [Haloperidol Lactate] Unknown (See Comments)     PT DOESN'T REMEMBER   • Seroquel [Quetiapine Fumarate] Unknown (See Comments)     PT DOESN'T REMEMBER         Discharge Disposition:  Skilled Nursing Facility (DC - External)    Diet:  Hospital:  Diet Order   Procedures   • Diet Regular; Cardiac, Consistent Carbohydrate, Renal       Activity:  Activity Instructions     Up WIth Assist               CODE STATUS:    Code Status and Medical Interventions:   Ordered at: 06/29/20 0804     Level Of Support Discussed With:    Patient     Code Status:    CPR     Medical Interventions (Level of Support Prior to Arrest):    Full           Additional Instructions for the Follow-ups that You Need to Schedule     Discharge Follow-up with PCP   As directed       Currently Documented PCP:    Goldy Dior MD    PCP Phone Number:    [phone]     Follow Up Details:  1 week         Discharge Follow-up with Specialty: Colorectal surgery follow-up recommended for hemorrhoids in 1 month after your COVID-19 infection has resolved.   As directed      Specialty:  Colorectal surgery follow-up recommended for hemorrhoids in 1 month after your COVID-19 infection has resolved.         Discharge Follow-up with Specified Provider: Check INR Wednesday and adjust warfarin further as needed per facility doctor or primary care or facility pharmacist   As directed      To:  Check INR Wednesday and adjust warfarin further as needed per facility doctor or primary care or facility pharmacist                     Electronically signed by Trevon Briseno MD, 07/06/20, 7:56 AM.      Time Spent on Discharge:  I spent  33  minutes on this discharge activity which included: face-to-face encounter with the patient, reviewing the data in the system, coordination of  the care with the nursing staff as well as consultants, documentation, and entering orders.        Electronically signed by Trevon Briseno MD, 07/08/20, 11:17 AM.          Electronically signed by Trevon Briseno MD at 07/08/20 8994

## 2020-07-08 NOTE — PLAN OF CARE
Problem: Patient Care Overview  Goal: Plan of Care Review  Outcome: Ongoing (interventions implemented as appropriate)  Flowsheets (Taken 7/8/2020 1312)  Progress: no change  Plan of Care Reviewed With: patient  Outcome Summary: Pt VSS. Remains on RA. Will DC to [facility] today. JACK Johns RN.

## 2020-07-08 NOTE — PROGRESS NOTES
"   LOS: 9 days    Patient Care Team:  Goldy Dior MD as PCP - General (Internal Medicine)    Chief Complaint: Shortness of breath COVID-19 positive  Consulted for ESRD.  70-year-old female with ESRD on hemodialysis Monday Wednesday Friday, CAD, diabetes, history of DVT on Coumadin, history of CVA cerebellar, morbid obesity, hemorrhoids with anal fissures presented with more places and headache.  She has missed her dialysis earlier.  She was recently diagnosed with COVID-19   Subjective   No new events.  Patient's remain the same.  Comfortable no obvious distress seen from outside of the room due to covid emergency       Review of Systems:   Patient seen from the outside the room comfortable, no new complaints according to the RN    Objective     Vital Sign Min/Max for last 24 hours  Temp  Min: 96.6 °F (35.9 °C)  Max: 98.2 °F (36.8 °C)   BP  Min: 101/58  Max: 153/75   Pulse  Min: 68  Max: 78   Resp  Min: 14  Max: 19   SpO2  Min: 94 %  Max: 100 %   No data recorded   No data recorded     Flowsheet Rows      First Filed Value   Admission Height  162.6 cm (64\") Documented at 06/29/2020 0341   Admission Weight  (!) 147 kg (324 lb) Documented at 06/29/2020 0341          I/O this shift:  In: 310 [I.V.:310]  Out: 3330 [Other:3330]  I/O last 3 completed shifts:  In: 300.2 [Blood:300.2]  Out: -     Physical Exam:  Due to COVID-19: Patient's examinations received from the staff RN and asymptomatic.  General Appearance: Laying comfortable in bed no obvious distress.  Morbidly obese  Neck: Supple as she is moving without any problem.  Lungs:Equal chest movement, nonlabored. Not using accessory muscles  Heart: Regular  Abdomen: not distended  Extremities: Positive edema according to the RN  Neuro: sleepy      WBC WBC   Date Value Ref Range Status   07/08/2020 7.81 3.40 - 10.80 10*3/mm3 Final   07/07/2020 7.01 3.40 - 10.80 10*3/mm3 Final   07/06/2020 8.08 3.40 - 10.80 10*3/mm3 Final      HGB Hemoglobin   Date Value Ref " Range Status   07/08/2020 8.3 (L) 12.0 - 15.9 g/dL Final   07/07/2020 6.8 (C) 12.0 - 15.9 g/dL Final   07/06/2020 7.4 (L) 12.0 - 15.9 g/dL Final      HCT Hematocrit   Date Value Ref Range Status   07/08/2020 26.8 (L) 34.0 - 46.6 % Final   07/07/2020 22.6 (L) 34.0 - 46.6 % Final   07/06/2020 25.8 (L) 34.0 - 46.6 % Final      Platlets No results found for: LABPLAT   MCV MCV   Date Value Ref Range Status   07/08/2020 96.8 79.0 - 97.0 fL Final   07/07/2020 100.4 (H) 79.0 - 97.0 fL Final   07/06/2020 102.0 (H) 79.0 - 97.0 fL Final          Sodium Sodium   Date Value Ref Range Status   07/08/2020 132 (L) 136 - 145 mmol/L Final      Potassium Potassium   Date Value Ref Range Status   07/08/2020 5.1 3.5 - 5.2 mmol/L Final      Chloride Chloride   Date Value Ref Range Status   07/08/2020 95 (L) 98 - 107 mmol/L Final      CO2 CO2   Date Value Ref Range Status   07/08/2020 24.0 22.0 - 29.0 mmol/L Final      BUN BUN   Date Value Ref Range Status   07/08/2020 67 (H) 8 - 23 mg/dL Final      Creatinine Creatinine   Date Value Ref Range Status   07/08/2020 6.06 (H) 0.57 - 1.00 mg/dL Final      Calcium Calcium   Date Value Ref Range Status   07/08/2020 8.9 8.6 - 10.5 mg/dL Final      PO4 No results found for: CAPO4   Albumin No results found for: ALBUMIN   Magnesium No results found for: MG   Uric Acid No results found for: URICACID        Results Review:     I reviewed the patient's new clinical results.      aspirin 81 mg Oral Daily   atorvastatin 80 mg Oral Nightly   budesonide-formoterol 2 puff Inhalation BID - RT   calcitriol 0.25 mcg Oral Daily   carvedilol 12.5 mg Oral BID With Meals   docusate sodium 100 mg Oral BID   Hydrocortisone (Perianal)  Rectal BID   insulin lispro 0-7 Units Subcutaneous TID AC   isosorbide mononitrate 30 mg Oral Daily   nystatin  Topical Q12H   pantoprazole 40 mg Oral Q AM   saccharomyces boulardii 250 mg Oral Daily   sertraline 50 mg Oral Daily   sevelamer 800 mg Oral TID With Meals   sodium  chloride 10 mL Intravenous Q12H   warfarin 2 mg Oral Daily       Pharmacy Consult        Medication Review: As above    Assessment/Plan   1.  ESRD dialysis Monday Wednesday Friday.  2.  Blood pressure: Monitoring closely with current medications.  3.  BMD: On phosphate binders  4.  Hypoxia: Monitor closely.  5.  Anemia of chronic kidney disease.  6.  Acute respiratory failure with hypoxia COVID-19 positive.  7.  Epistaxis with supratherapeutic INR.  Plan:  HD done this am  HD per [facility].  Orders written.  Transfuse at hb<7.   Avoid [facility] in the setting of COVID-19 infection.   High risk patient with multiple medical problems    Eloy Zapata MD  07/08/20  14:27

## 2020-07-08 NOTE — PROGRESS NOTES
Case Management Discharge Note      Final Note: Patient's plan is a skilled bed at [facility] today, 7/8.  Nurse to call report to [phone].  CM will fax transfer summary to [phone].  [facility] ambulance scheduled to transport patient at 1500.  PCS on chartlet.  Please send a copy of transfer summary and any narcatic scripts to the facility with the patient.  Notified [facility] that patient would be discharging today as well.  Transfers summary faxed to dialysis clinic at [phone].  Megha Tam, RN [phone]         Destination - Selection Complete      Service Provider Request Status Selected Services Address Phone Number Fax Number    [facility] Selected Skilled Nursing [address] [id number] [phone]      Durable Medical Equipment      No service has been selected for the patient.      Dialysis/Infusion      Service Provider Request Status Selected Services Address Phone Number Fax Number    [facility] [address] Selected Dialysis [address], [place] [id number] [phone]      Home Medical Care      No service has been selected for the patient.      Therapy      No service has been selected for the patient.      Community Resources      No service has been selected for the patient.        Transportation Services  Ambulance: [facility]    Final Discharge Disposition Code: 03 - skilled nursing facility (SNF)

## 2020-08-02 ENCOUNTER — PATIENT OUTREACH (OUTPATIENT)
Dept: CALL CENTER | Facility: HOSPITAL | Age: 70
End: 2020-08-02

## 2020-08-02 NOTE — OUTREACH NOTE
COVID-19 Plasma Donation Recruitment Note      Responses   Disposition:  Does not qualify as a candidate   Patient does not quailfy comments:  ineligible, nursing home resident, on dialysis, takes coumadin, debility          Emely Field RN    8/2/2020, 17:18

## 2020-10-06 ENCOUNTER — APPOINTMENT (OUTPATIENT)
Dept: PREADMISSION TESTING | Facility: HOSPITAL | Age: 70
End: 2020-10-06

## 2020-10-06 ENCOUNTER — CONSULT (OUTPATIENT)
Dept: CARDIOLOGY | Facility: CLINIC | Age: 70
End: 2020-10-06

## 2020-10-06 VITALS
WEIGHT: 293 LBS | SYSTOLIC BLOOD PRESSURE: 121 MMHG | HEART RATE: 73 BPM | HEIGHT: 64 IN | TEMPERATURE: 97.3 F | BODY MASS INDEX: 50.02 KG/M2 | DIASTOLIC BLOOD PRESSURE: 68 MMHG

## 2020-10-06 VITALS — BODY MASS INDEX: 50.02 KG/M2 | WEIGHT: 293 LBS | HEIGHT: 64 IN

## 2020-10-06 DIAGNOSIS — Z01.811 PRE-OP CHEST EXAM: Primary | ICD-10-CM

## 2020-10-06 LAB
ALBUMIN SERPL-MCNC: 3.5 G/DL (ref 3.5–5.2)
ALBUMIN/GLOB SERPL: 1 G/DL
ALP SERPL-CCNC: 107 U/L (ref 39–117)
ALT SERPL W P-5'-P-CCNC: 7 U/L (ref 1–33)
ANION GAP SERPL CALCULATED.3IONS-SCNC: 10 MMOL/L (ref 5–15)
AST SERPL-CCNC: 9 U/L (ref 1–32)
BILIRUB SERPL-MCNC: 0.4 MG/DL (ref 0–1.2)
BUN SERPL-MCNC: 43 MG/DL (ref 8–23)
BUN/CREAT SERPL: 8.9 (ref 7–25)
CALCIUM SPEC-SCNC: 9.2 MG/DL (ref 8.6–10.5)
CHLORIDE SERPL-SCNC: 95 MMOL/L (ref 98–107)
CO2 SERPL-SCNC: 32 MMOL/L (ref 22–29)
CREAT SERPL-MCNC: 4.83 MG/DL (ref 0.57–1)
DEPRECATED RDW RBC AUTO: 64.8 FL (ref 37–54)
ERYTHROCYTE [DISTWIDTH] IN BLOOD BY AUTOMATED COUNT: 16.8 % (ref 12.3–15.4)
GFR SERPL CREATININE-BSD FRML MDRD: 11 ML/MIN/1.73
GFR SERPL CREATININE-BSD FRML MDRD: ABNORMAL ML/MIN/{1.73_M2}
GLOBULIN UR ELPH-MCNC: 3.4 GM/DL
GLUCOSE SERPL-MCNC: 212 MG/DL (ref 65–99)
HBA1C MFR BLD: 7.3 % (ref 4.8–5.6)
HCT VFR BLD AUTO: 40.3 % (ref 34–46.6)
HGB BLD-MCNC: 11.5 G/DL (ref 12–15.9)
INR PPP: 1.56 (ref 0.85–1.16)
MCH RBC QN AUTO: 29.9 PG (ref 26.6–33)
MCHC RBC AUTO-ENTMCNC: 28.5 G/DL (ref 31.5–35.7)
MCV RBC AUTO: 104.7 FL (ref 79–97)
PLATELET # BLD AUTO: 198 10*3/MM3 (ref 140–450)
PMV BLD AUTO: 10.1 FL (ref 6–12)
POTASSIUM SERPL-SCNC: 5 MMOL/L (ref 3.5–5.2)
PROT SERPL-MCNC: 6.9 G/DL (ref 6–8.5)
PROTHROMBIN TIME: 18.4 SECONDS (ref 11.5–14)
RBC # BLD AUTO: 3.85 10*6/MM3 (ref 3.77–5.28)
SARS-COV-2 RNA RESP QL NAA+PROBE: NOT DETECTED
SODIUM SERPL-SCNC: 137 MMOL/L (ref 136–145)
WBC # BLD AUTO: 7.22 10*3/MM3 (ref 3.4–10.8)

## 2020-10-06 PROCEDURE — 87635 SARS-COV-2 COVID-19 AMP PRB: CPT | Performed by: SURGERY

## 2020-10-06 PROCEDURE — 85027 COMPLETE CBC AUTOMATED: CPT | Performed by: SURGERY

## 2020-10-06 PROCEDURE — 93010 ELECTROCARDIOGRAM REPORT: CPT | Performed by: INTERNAL MEDICINE

## 2020-10-06 PROCEDURE — 99204 OFFICE O/P NEW MOD 45 MIN: CPT | Performed by: PHYSICIAN ASSISTANT

## 2020-10-06 PROCEDURE — C9803 HOPD COVID-19 SPEC COLLECT: HCPCS

## 2020-10-06 PROCEDURE — 83036 HEMOGLOBIN GLYCOSYLATED A1C: CPT | Performed by: SURGERY

## 2020-10-06 PROCEDURE — 80053 COMPREHEN METABOLIC PANEL: CPT | Performed by: SURGERY

## 2020-10-06 PROCEDURE — 36415 COLL VENOUS BLD VENIPUNCTURE: CPT

## 2020-10-06 PROCEDURE — 93000 ELECTROCARDIOGRAM COMPLETE: CPT | Performed by: PHYSICIAN ASSISTANT

## 2020-10-06 PROCEDURE — 85610 PROTHROMBIN TIME: CPT | Performed by: SURGERY

## 2020-10-06 PROCEDURE — 93005 ELECTROCARDIOGRAM TRACING: CPT

## 2020-10-06 RX ORDER — NICOTINE POLACRILEX 4 MG
LOZENGE BUCCAL
COMMUNITY
End: 2021-02-17 | Stop reason: HOSPADM

## 2020-10-06 RX ORDER — WARFARIN SODIUM 2.5 MG/1
6.5 TABLET ORAL
COMMUNITY
End: 2021-02-17 | Stop reason: HOSPADM

## 2020-10-06 RX ORDER — LACTULOSE 10 G/15ML
30 SOLUTION ORAL 2 TIMES DAILY PRN
COMMUNITY
End: 2020-10-26

## 2020-10-06 RX ORDER — PRAZOSIN HYDROCHLORIDE 1 MG/1
1 CAPSULE ORAL NIGHTLY
COMMUNITY
End: 2021-02-17 | Stop reason: HOSPADM

## 2020-10-06 NOTE — PROGRESS NOTES
[facility] at [facility]  INITIAL OFFICE CONSULT      Joy Bueno  1950  PCP: Goldy Dior MD    SUBJECTIVE:   Joy Bueno is a 70 y.o. female seen for a consultation visit regarding the following:     Chief Complaint:   Chief Complaint   Patient presents with   • Surgical Clearance          Consultation is requested by Carlos Enrique Calderón, * for evaluation of Surgical Clearance        History:  Pleasant 70-year-old female presents to our office today for preop evaluation prior to right arm fistula revision with Dr. Garcia.  The patient is a permanent resident of [facility] home is accompanied by her caregiver.  She denies any previous cardiac history.  She has had multiple severe co-morbidities and health issues such as end-stage renal disease, diabetes, morbid obesity and she was recently hospital with COVID infection pneumonia and GI bleed.  She states she has been a nursing home for the past few months she has not had any chest pain or chest tightness is stressed and pectoris.  She is very sedentary is not active she is now unable to walk very far because she feels weak.  Her fistula needs revision EKG today was obtained there was consideration that this was abnormal from previous EKGs.  As mentioned above she denies that she is having any chest pain or chest tightness suggesting angina or worsening heart failure symptoms.  She states when she is getting dialysis and her weight fluid status is stable she feels okay.  She denies palpitations dizziness near syncope or syncope.      Cardiac PMH: (Old records have been reviewed and summarized below)  1. Abnormal EKG  a. Echocardiogram 9/30/19, Normal EF, No VHD  2. ESRD: HD M,W,F. Plan for fistula revision  3. HTN  4. Super Morbid Obesity  5. Chronic DHF  6. Remote DVT brachial vein of left upper extremity  7. Acute blood loss anemia, bleeding hemorrhoids  8. PNA, COVID-19 Positive 7/6/2020  9. DM, Insulin  dependent           Past Medical History, Past Surgical History, Family history, Social History, and Medications were all reviewed with the patient today and updated as necessary.     Current Outpatient Medications   Medication Sig Dispense Refill   • acetaminophen (TYLENOL) 325 MG tablet Take 650 mg by mouth Every 6 (Six) Hours As Needed for Mild Pain .     • albuterol (PROVENTIL HFA;VENTOLIN HFA) 108 (90 BASE) MCG/ACT inhaler Inhale 2 puffs Every 4 (Four) Hours As Needed for Wheezing.     • aspirin 81 MG chewable tablet Chew 1 tablet Daily. 30 tablet 0   • atorvastatin (LIPITOR) 80 MG tablet Take 1 tablet by mouth Every Night. 30 tablet 0   • calcitriol (ROCALTROL) 0.25 MCG capsule Take 0.25 mcg by mouth Daily.     • carvedilol (COREG) 12.5 MG tablet Take 1 tablet by mouth 2 (Two) Times a Day With Meals.     • Cetirizine HCl (ZyrTEC Allergy) 10 MG capsule Take 10 mg by mouth Daily As Needed (allergies). 30 capsule    • dextrose (GLUTOSE) 40 % gel Take  by mouth Every 1 (One) Hour As Needed for Low Blood Sugar.     • docusate sodium (COLACE) 100 MG capsule Take 1 capsule by mouth 2 (Two) Times a Day.     • fluticasone (FLONASE) 50 MCG/ACT nasal spray 2 sprays into the nostril(s) as directed by provider Daily.     • fluticasone-salmeterol (ADVAIR) 500-50 MCG/DOSE DISKUS Inhale 1 puff 2 (Two) Times a Day.     • HYDROcodone-acetaminophen (NORCO) 5-325 MG per tablet Take 1 tablet by mouth Every 8 (Eight) Hours As Needed for Moderate Pain  (Pain). 10 tablet 0   • Hydrocortisone, Perianal, (ANUSOL-HC) 2.5 % rectal cream Insert  into the rectum Daily As Needed for Hemorrhoids.     • insulin aspart (novoLOG FLEXPEN) 100 UNIT/ML solution pen-injector sc pen Inject 5 Units under the skin into the appropriate area as directed 3 (Three) Times a Day With Meals.     • insulin detemir (LEVEMIR) 100 UNIT/ML injection Inject 5 Units under the skin into the appropriate area as directed 2 (Two) Times a Day.     •  ipratropium-albuterol (DUO-NEB) 0.5-2.5 mg/3 ml nebulizer Take 3 mL by nebulization Every 6 (Six) Hours As Needed for Wheezing.     • isosorbide mononitrate (IMDUR) 30 MG 24 hr tablet Take 30 mg by mouth Daily.     • lactulose (CHRONULAC) 10 GM/15ML solution Take 30 mL by mouth 2 (Two) Times a Day As Needed.     • Lidocaine 2 % gel Apply  topically 3 (Three) Times a Day As Needed (Mix w/LDiltiazem 2% cream and apply TID PRN to anal fissure).     • meclizine (ANTIVERT) 12.5 MG tablet Take 1 tablet by mouth 3 (Three) Times a Day As Needed for dizziness. 15 tablet 0   • melatonin 3 MG tablet Take 3 mg by mouth Every Night.     • Multiple Vitamin (TAB-A-STAR) tablet Take 1 tablet by mouth Daily.     • neomycin-polymyxin-hydrocortisone (CORTISPORIN) 3.5-85664-8 otic solution Administer 3 drops into both ears 3 (Three) Times a Day.     • ondansetron (ZOFRAN) 4 MG tablet Take 4 mg by mouth Every 8 (Eight) Hours As Needed for Nausea or Vomiting.     • pantoprazole (PROTONIX) 40 MG EC tablet Take 40 mg by mouth Daily.     • PHARMACY CONSULT Continuous As Needed (2.0-3.0).     • phenylephrine-cocoa Butter (PREPARATION H) 0.25-88.44 % suppository suppository Insert  into the rectum As Needed for Hemorrhoids.     • polyethylene glycol (MIRALAX) 17 g packet Take 17 g by mouth 2 (Two) Times a Day As Needed (Constipation).     • prazosin (MINIPRESS) 1 MG capsule Take 1 mg by mouth Every Night.     • promethazine (PHENERGAN) 25 MG tablet Take 1 tablet by mouth 3 (Three) Times a Week if Needed for Nausea or Vomiting. On dialysis days (Mon Weds Fri)     • saccharomyces boulardii (FLORASTOR) 250 MG capsule Take 250 mg by mouth Daily.     • sertraline (ZOLOFT) 50 MG tablet Take 50 mg by mouth Daily.     • sevelamer (RENVELA) 800 MG tablet Take 800 mg by mouth 3 (Three) Times a Day With Meals. 0500, 1200 & 1700     • vitamin D (ERGOCALCIFEROL) 82935 units capsule capsule Take 50,000 Units by mouth 1 (One) Time Per Week. TAKES ON  WEDNESDAYS     • warfarin (COUMADIN) 2 MG tablet Take 1 tablet by mouth Every Night. Indications: history of DVT (Patient taking differently: Take 2 mg by mouth Every Night. TO EQUAL 4.5 DAILY - HOLDING 3 DAYS BEFORE SURGERY  Indications: history of DVT)     • warfarin (COUMADIN) 2.5 MG tablet Take 2.5 mg by mouth Daily. TO EQUAL 4.5 DAILY - HOLDING 3 DAYS BEFORE SURGERY       No current facility-administered medications for this visit.      Allergies   Allergen Reactions   • Geodon [Ziprasidone Hcl] Unknown (See Comments)     PT DOESN'T REMEMBER   • Haldol [Haloperidol Lactate] Unknown (See Comments)     PT DOESN'T REMEMBER   • Seroquel [Quetiapine Fumarate] Unknown (See Comments)     PT DOESN'T REMEMBER         Past Medical History:   Diagnosis Date   • Acute on chronic diastolic heart failure (CMS/[facility])    • Acute on chronic heart failure (CMS/[facility])    • Acute respiratory failure with hypercapnia (CMS/[facility]) 4/27/2019   • Anemia    • Anxiety    • Asthma    • Bilateral leg pain 10/4/2017   • Boil     on head    • Breakdown of skin tissue     ON BOTTOM - PT STATES UNSURE AND AID STATES DOESNT THINK OPEN    • Chest wall abscess 4/5/2019   • CHF (congestive heart failure) (CMS/[facility])    • Chronic diastolic heart failure (CMS/[facility])    • Chronic kidney disease     stage 4   • Constipation    • Coronary artery disease    • Diabetes mellitus (CMS/[facility])     checks sugar once per day    • Diabetes mellitus, type II (CMS/[facility]) 8/3/2018   • Dialysis patient (CMS/[facility])     mwf   • Dizzy    • Dizzy    • DVT of upper extremity (deep vein thrombosis) (CMS/[facility])     left   • Elevated troponin 9/1/2019   • Encephalopathy, metabolic 4/27/2019   • Fecal impaction of rectum (CMS/[facility])    • Generalized weakness 7/31/2019   • GERD (gastroesophageal reflux disease)    • Headache    • History of Clostridium difficile infection 08/03/2018    h/o   • History of respiratory failure, since off home oxygen 8/3/2018   • History of transfusion     NO  REACTION RECALLED    • History of UTI 8/3/2018   • Hyperkalemia 2019   • Hypertension    • Morbid obesity (CMS/[facility])    • Neuropathy    • On home O2     3l PRN NIGHTLY    • Osteoarthritis    • Sleep apnea     doesnt use machine     • Slow transit constipation 2019   • Tinea capitis 8/3/2018   • URI (upper respiratory infection) 2019   • Urinary tract infection due to extended-spectrum beta lactamase (ESBL)-producing Klebsiella 2019   • Urinary tract infection without hematuria 2019   • UTI (urinary tract infection), bacterial 10/4/2017   • Vertigo    • Volume overload 2019   • Wears glasses      Past Surgical History:   Procedure Laterality Date   • ARM LESION/CYST EXCISION N/A 2019    Procedure: ABSCESS DRAINAGE X2 ON BACK;  Surgeon: Carlos Enrique Calderón MD;  Location:  [facility];  Service: General   • ARTERIOVENOUS FISTULA/SHUNT SURGERY Left 10/22/2018    Procedure: LEFT UPPER EXTREMITY ARTERIOVENOUS FISTULA CREATION;  Surgeon: Carlos Enrique Calderón MD;  Location:  [facility];  Service: Vascular   • ARTERIOVENOUS FISTULA/SHUNT SURGERY Right 11/15/2019    Procedure: BRACHIOCEPHALIC AV FISTULA CREATION RIGHT;  Surgeon: Carlos Enrique Calderón MD;  Location:  [facility];  Service: Vascular   • CATARACT EXTRACTION      bilat    •  SECTION     • COLONOSCOPY N/A 2017    Procedure: COLONOSCOPY;  Surgeon: Mark I Brunner, MD;  Location:  [facility];  Service:    • ENDOSCOPY N/A 2017    Procedure: ESOPHAGOGASTRODUODENOSCOPY ;  Surgeon: Velasquez Call MD;  Location:  [facility];  Service:    • HERNIA REPAIR      umbilical hernia unsure about mesh    • INSERTION HEMODIALYSIS CATHETER Right 10/17/2018    Procedure: HEMODIALYSIS CATHETER INSERTION;  Surgeon: Carlos Enrique Calderón MD;  Location:  [facility];  Service: General   • TONSILLECTOMY       Family History   Problem Relation Age of Onset   • Cardiomyopathy Mother    • Stroke Father    • Diabetes Father   "    Social History     Tobacco Use   • Smoking status: Former Smoker     Packs/day: 0.05     Types: Cigarettes     Quit date:      Years since quittin.7   • Smokeless tobacco: Never Used   • Tobacco comment: smoked since high school but didnt smoke much and unsure when quit fully    Substance Use Topics   • Alcohol use: No       ROS:  Review of Symptoms:  General: no recent weight loss/gain, weakness or fatigue  Skin: no rashes, lumps, or other skin changes  HEENT: no dizziness, lightheadedness, or vision changes  Respiratory: no cough or hemoptysis  Cardiovascular: no palpitations, and tachycardia  Gastrointestinal: no black/tarry stools or diarrhea  Urinary: no change in frequency or urgency  Peripheral Vascular: no claudication or leg cramps  Musculoskeletal: no muscle or joint pain/stiffness  Psychiatric: no depression or excessive stress  Neurological: no sensory or motor loss, no syncope  Hematologic: no anemia, easy bruising or bleeding  Endocrine: no thyroid problems, nor heat or cold intolerance         PHYSICAL EXAM:   /68 (BP Location: Left arm, Patient Position: Sitting)   Pulse 73   Temp 97.3 °F (36.3 °C)   Ht 162.6 cm (64\")   Wt (!) 140 kg (309 lb) Comment: patient reported  BMI 53.04 kg/m²      Wt Readings from Last 5 Encounters:   10/06/20 (!) 140 kg (309 lb)   10/06/20 (!) 140 kg (309 lb)   20 135 kg (298 lb)   02/15/20 136 kg (300 lb)   11/15/19 136 kg (300 lb)     BP Readings from Last 5 Encounters:   10/06/20 121/68   20 101/58   02/15/20 136/69   11/15/19 (!) 200/85   11/10/19 169/84       General-Well Nourished,Morbid obesity Well developed  Eyes - PERRLA  Neck- supple, No mass  CV- regular rate and rhythm, no MRG  Lung- clear bilaterally  Abd- soft, +BS  Musc/skel - Norm strength and range of motion  Skin- warm and dry  Neuro - Alert & Oriented x 3, appropriate mood.    Medical problems and test results were reviewed with the patient today.     Results for " orders placed or performed in visit on 10/06/20   COVID-19,CEPHEID,[facility] IN-HOUSE(OR EMERGENT/ADD-ON),NP SWAB IN TRANSPORT MEDIA 3-4 HR TAT - Swab, Nasopharynx    Specimen: Nasopharynx; Swab   Result Value Ref Range    COVID19 Not Detected Not Detected - Ref. Range   Hemoglobin A1c    Specimen: Blood   Result Value Ref Range    Hemoglobin A1C 7.30 (H) 4.80 - 5.60 %   CBC (No Diff)    Specimen: Blood   Result Value Ref Range    WBC 7.22 3.40 - 10.80 10*3/mm3    RBC 3.85 3.77 - 5.28 10*6/mm3    Hemoglobin 11.5 (L) 12.0 - 15.9 g/dL    Hematocrit 40.3 34.0 - 46.6 %    .7 (H) 79.0 - 97.0 fL    MCH 29.9 26.6 - 33.0 pg    MCHC 28.5 (L) 31.5 - 35.7 g/dL    RDW 16.8 (H) 12.3 - 15.4 %    RDW-SD 64.8 (H) 37.0 - 54.0 fl    MPV 10.1 6.0 - 12.0 fL    Platelets 198 140 - 450 10*3/mm3   Comprehensive Metabolic Panel    Specimen: Blood   Result Value Ref Range    Glucose 212 (H) 65 - 99 mg/dL    BUN 43 (H) 8 - 23 mg/dL    Creatinine 4.83 (H) 0.57 - 1.00 mg/dL    Sodium 137 136 - 145 mmol/L    Potassium 5.0 3.5 - 5.2 mmol/L    Chloride 95 (L) 98 - 107 mmol/L    CO2 32.0 (H) 22.0 - 29.0 mmol/L    Calcium 9.2 8.6 - 10.5 mg/dL    Total Protein 6.9 6.0 - 8.5 g/dL    Albumin 3.50 3.50 - 5.20 g/dL    ALT (SGPT) 7 1 - 33 U/L    AST (SGOT) 9 1 - 32 U/L    Alkaline Phosphatase 107 39 - 117 U/L    Total Bilirubin 0.4 0.0 - 1.2 mg/dL    eGFR Non  Amer      eGFR  African Amer 11 (L) >60 mL/min/1.73    Globulin 3.4 gm/dL    A/G Ratio 1.0 g/dL    BUN/Creatinine Ratio 8.9 7.0 - 25.0    Anion Gap 10.0 5.0 - 15.0 mmol/L   Protime-INR    Specimen: Blood   Result Value Ref Range    Protime 18.4 (H) 11.5 - 14.0 Seconds    INR 1.56 (H) 0.85 - 1.16         Total Cholesterol   Date Value Ref Range Status   09/30/2019 118 0 - 200 mg/dL Final     HDL Cholesterol   Date Value Ref Range Status   09/30/2019 31 (L) 40 - 60 mg/dL Final     LDL Cholesterol    Date Value Ref Range Status   09/30/2019 38 0 - 100 mg/dL Final     VLDL Cholesterol   Date  Value Ref Range Status   09/30/2019 49.2 mg/dL Final       EKG:  (EKG/Tracing has been independently visualized by me and summarized below)      ECG 12 Lead    Date/Time: 10/6/2020 4:15 PM  Performed by: Rocky Israel PA  Authorized by: Rocky Israel PA   Comparison: not compared with previous ECG   Rhythm: sinus rhythm  Rate: normal  Conduction: conduction normal  QRS axis: normal  Other: no other findings  Other findings: T wave abnormality    Clinical impression: abnormal EKG  Comments: EKg unchanged from previous EKG.             ASSESSMENT   1. Abnormal EKG, unchanged from previous EKG 2019. No angina symptoms. Normal Echocardiogram 2019  2. HTN: Controlled.   3. ESRD: HD MWF  4. Super Morbid obesity  5. 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  EKG is unchanged.  She is not having angina symptoms.  She had a previous echocardiogram revealing normal LV function 2019.  Patient considered moderate cardiovascular risk to pursue fistula repair secondary to her multiple comorbidities including morbid obesity, diabetes, ESRD, and HTN.     Cardiology/Electrophysiology  10/06/20  13:25 EDT  Will Jhonatan BROWNING

## 2020-10-06 NOTE — PAT
PATIENT EKG WAS REVIEWED BY DR MIRANDA AND HE STATES PT NEEDS TO BE SEEN BY CARDIOLOGIST BEFORE BEING CLEARED FOR SURGERY-   SAMI AT DR LANDEROS OFFICE INFORMED AND STATES  TRY TO GET PT IN WITH ANYONE FOR CARDIOLOGY SINCE SURGERY IN 2 DAYS-    KAILA AT [facility] NOTIFIED AND GOT PATIENT IN WITH WILL KATTY TODAY  PM BUT WANTS PATIENT TO ARRIVE NOW TO WORK ON PAPERWORK. PATIENT AND AID (MAUREEN) VERBALIZE UNDERSTANDING. PAT TO FOLLOW UP WITH APPOINTMENT TO SEE IF PATIENT GETS CLEARED FOR SURGERY.       sami at dr landeros office informed of cardiology appointment and informed pat will let her know if pt gets cleared or cancelled.       BACTROBAN TO BE DONE IN PREOP.         PAULETTE AT [place] (FACILITY PATIENT RESIDES) INFORMED OF NEEDING COUMADIN STOPPAGE LETTER-   STATES WILL FAX OVER WHEN GET IT FINISHED.       COVID DONE IN PAT.

## 2020-10-07 ENCOUNTER — ANESTHESIA EVENT (OUTPATIENT)
Dept: PERIOP | Facility: HOSPITAL | Age: 70
End: 2020-10-07

## 2020-10-07 RX ORDER — FAMOTIDINE 10 MG/ML
20 INJECTION, SOLUTION INTRAVENOUS ONCE
Status: CANCELLED | OUTPATIENT
Start: 2020-10-07 | End: 2020-10-07

## 2020-10-07 RX ORDER — SODIUM CHLORIDE, SODIUM LACTATE, POTASSIUM CHLORIDE, CALCIUM CHLORIDE 600; 310; 30; 20 MG/100ML; MG/100ML; MG/100ML; MG/100ML
9 INJECTION, SOLUTION INTRAVENOUS CONTINUOUS
Status: CANCELLED | OUTPATIENT
Start: 2020-10-07

## 2020-10-07 NOTE — PAT
Notified Jayne Lo that patient was cleared for surgery by Greg Isarel.  Copy of note printed and placed on chart.  Chart logged to preop.

## 2020-10-08 ENCOUNTER — HOSPITAL ENCOUNTER (OUTPATIENT)
Facility: HOSPITAL | Age: 70
Setting detail: SURGERY ADMIT
Discharge: NURSING, REHAB OR LONG-TERM CARE | End: 2020-10-08
Attending: SURGERY | Admitting: SURGERY

## 2020-10-08 ENCOUNTER — ANESTHESIA (OUTPATIENT)
Dept: PERIOP | Facility: HOSPITAL | Age: 70
End: 2020-10-08

## 2020-10-08 VITALS
TEMPERATURE: 97.4 F | SYSTOLIC BLOOD PRESSURE: 172 MMHG | DIASTOLIC BLOOD PRESSURE: 75 MMHG | RESPIRATION RATE: 20 BRPM | HEART RATE: 77 BPM | OXYGEN SATURATION: 96 %

## 2020-10-08 DIAGNOSIS — N18.6 ESRD (END STAGE RENAL DISEASE) (HCC): Primary | ICD-10-CM

## 2020-10-08 LAB
GLUCOSE BLDC GLUCOMTR-MCNC: 130 MG/DL (ref 70–130)
GLUCOSE BLDC GLUCOMTR-MCNC: 163 MG/DL (ref 70–130)
POTASSIUM SERPL-SCNC: 5.2 MMOL/L (ref 3.5–5.2)

## 2020-10-08 PROCEDURE — 76942 ECHO GUIDE FOR BIOPSY: CPT | Performed by: SURGERY

## 2020-10-08 PROCEDURE — 25010000002 PHENYLEPHRINE PER 1 ML: Performed by: NURSE ANESTHETIST, CERTIFIED REGISTERED

## 2020-10-08 PROCEDURE — 25010000002 ONDANSETRON PER 1 MG: Performed by: NURSE ANESTHETIST, CERTIFIED REGISTERED

## 2020-10-08 PROCEDURE — 25010000002 FENTANYL CITRATE (PF) 100 MCG/2ML SOLUTION: Performed by: NURSE ANESTHETIST, CERTIFIED REGISTERED

## 2020-10-08 PROCEDURE — 25010000002 NEOSTIGMINE 10 MG/10ML SOLUTION: Performed by: NURSE ANESTHETIST, CERTIFIED REGISTERED

## 2020-10-08 PROCEDURE — 94799 UNLISTED PULMONARY SVC/PX: CPT

## 2020-10-08 PROCEDURE — 25010000002 PROPOFOL 10 MG/ML EMULSION: Performed by: NURSE ANESTHETIST, CERTIFIED REGISTERED

## 2020-10-08 PROCEDURE — 25010000002 HEPARIN (PORCINE) PER 1000 UNITS: Performed by: SURGERY

## 2020-10-08 PROCEDURE — 82962 GLUCOSE BLOOD TEST: CPT

## 2020-10-08 PROCEDURE — 84132 ASSAY OF SERUM POTASSIUM: CPT | Performed by: ANESTHESIOLOGY

## 2020-10-08 PROCEDURE — 25010000002 VANCOMYCIN 10 G RECONSTITUTED SOLUTION: Performed by: SURGERY

## 2020-10-08 DEVICE — FLOSEAL HEMOSTATIC MATRIX, 10ML
Type: IMPLANTABLE DEVICE | Status: FUNCTIONAL
Brand: FLOSEAL HEMOSTATIC MATRIX

## 2020-10-08 RX ORDER — ATRACURIUM BESYLATE 10 MG/ML
INJECTION, SOLUTION INTRAVENOUS AS NEEDED
Status: DISCONTINUED | OUTPATIENT
Start: 2020-10-08 | End: 2020-10-08 | Stop reason: SURG

## 2020-10-08 RX ORDER — SODIUM CHLORIDE 9 MG/ML
INJECTION, SOLUTION INTRAVENOUS CONTINUOUS PRN
Status: DISCONTINUED | OUTPATIENT
Start: 2020-10-08 | End: 2020-10-08 | Stop reason: SURG

## 2020-10-08 RX ORDER — FENTANYL CITRATE 50 UG/ML
INJECTION, SOLUTION INTRAMUSCULAR; INTRAVENOUS AS NEEDED
Status: DISCONTINUED | OUTPATIENT
Start: 2020-10-08 | End: 2020-10-08 | Stop reason: SURG

## 2020-10-08 RX ORDER — LABETALOL HYDROCHLORIDE 5 MG/ML
5 INJECTION, SOLUTION INTRAVENOUS
Status: DISCONTINUED | OUTPATIENT
Start: 2020-10-08 | End: 2020-10-08 | Stop reason: HOSPADM

## 2020-10-08 RX ORDER — HYDROCODONE BITARTRATE AND ACETAMINOPHEN 5; 325 MG/1; MG/1
1 TABLET ORAL EVERY 6 HOURS PRN
Qty: 15 TABLET | Refills: 0 | Status: SHIPPED | OUTPATIENT
Start: 2020-10-08 | End: 2021-02-17 | Stop reason: HOSPADM

## 2020-10-08 RX ORDER — IPRATROPIUM BROMIDE AND ALBUTEROL SULFATE 2.5; .5 MG/3ML; MG/3ML
3 SOLUTION RESPIRATORY (INHALATION)
Status: DISCONTINUED | OUTPATIENT
Start: 2020-10-08 | End: 2020-10-08 | Stop reason: HOSPADM

## 2020-10-08 RX ORDER — HYDRALAZINE HYDROCHLORIDE 20 MG/ML
5 INJECTION INTRAMUSCULAR; INTRAVENOUS
Status: DISCONTINUED | OUTPATIENT
Start: 2020-10-08 | End: 2020-10-08 | Stop reason: HOSPADM

## 2020-10-08 RX ORDER — ONDANSETRON 2 MG/ML
4 INJECTION INTRAMUSCULAR; INTRAVENOUS ONCE AS NEEDED
Status: DISCONTINUED | OUTPATIENT
Start: 2020-10-08 | End: 2020-10-08 | Stop reason: HOSPADM

## 2020-10-08 RX ORDER — SODIUM CHLORIDE 0.9 % (FLUSH) 0.9 %
3-10 SYRINGE (ML) INJECTION AS NEEDED
Status: DISCONTINUED | OUTPATIENT
Start: 2020-10-08 | End: 2020-10-08 | Stop reason: HOSPADM

## 2020-10-08 RX ORDER — ONDANSETRON 2 MG/ML
INJECTION INTRAMUSCULAR; INTRAVENOUS AS NEEDED
Status: DISCONTINUED | OUTPATIENT
Start: 2020-10-08 | End: 2020-10-08 | Stop reason: SURG

## 2020-10-08 RX ORDER — NALOXONE HCL 0.4 MG/ML
0.4 VIAL (ML) INJECTION AS NEEDED
Status: DISCONTINUED | OUTPATIENT
Start: 2020-10-08 | End: 2020-10-08 | Stop reason: HOSPADM

## 2020-10-08 RX ORDER — ALBUTEROL SULFATE 90 UG/1
AEROSOL, METERED RESPIRATORY (INHALATION) AS NEEDED
Status: DISCONTINUED | OUTPATIENT
Start: 2020-10-08 | End: 2020-10-08 | Stop reason: SURG

## 2020-10-08 RX ORDER — SODIUM CHLORIDE 0.9 % (FLUSH) 0.9 %
10 SYRINGE (ML) INJECTION EVERY 12 HOURS SCHEDULED
Status: DISCONTINUED | OUTPATIENT
Start: 2020-10-08 | End: 2020-10-08 | Stop reason: HOSPADM

## 2020-10-08 RX ORDER — FENTANYL CITRATE 50 UG/ML
50 INJECTION, SOLUTION INTRAMUSCULAR; INTRAVENOUS
Status: DISCONTINUED | OUTPATIENT
Start: 2020-10-08 | End: 2020-10-08 | Stop reason: HOSPADM

## 2020-10-08 RX ORDER — HYDROCODONE BITARTRATE AND ACETAMINOPHEN 5; 325 MG/1; MG/1
1 TABLET ORAL ONCE AS NEEDED
Status: DISCONTINUED | OUTPATIENT
Start: 2020-10-08 | End: 2020-10-08 | Stop reason: HOSPADM

## 2020-10-08 RX ORDER — NEOSTIGMINE METHYLSULFATE 1 MG/ML
INJECTION, SOLUTION INTRAVENOUS AS NEEDED
Status: DISCONTINUED | OUTPATIENT
Start: 2020-10-08 | End: 2020-10-08 | Stop reason: SURG

## 2020-10-08 RX ORDER — MAGNESIUM HYDROXIDE 1200 MG/15ML
LIQUID ORAL AS NEEDED
Status: DISCONTINUED | OUTPATIENT
Start: 2020-10-08 | End: 2020-10-08 | Stop reason: HOSPADM

## 2020-10-08 RX ORDER — LIDOCAINE HYDROCHLORIDE 10 MG/ML
0.5 INJECTION, SOLUTION EPIDURAL; INFILTRATION; INTRACAUDAL; PERINEURAL ONCE AS NEEDED
Status: COMPLETED | OUTPATIENT
Start: 2020-10-08 | End: 2020-10-08

## 2020-10-08 RX ORDER — FAMOTIDINE 20 MG/1
20 TABLET, FILM COATED ORAL ONCE
Status: COMPLETED | OUTPATIENT
Start: 2020-10-08 | End: 2020-10-08

## 2020-10-08 RX ORDER — PROPOFOL 10 MG/ML
VIAL (ML) INTRAVENOUS AS NEEDED
Status: DISCONTINUED | OUTPATIENT
Start: 2020-10-08 | End: 2020-10-08 | Stop reason: SURG

## 2020-10-08 RX ORDER — LIDOCAINE HYDROCHLORIDE 10 MG/ML
INJECTION, SOLUTION EPIDURAL; INFILTRATION; INTRACAUDAL; PERINEURAL AS NEEDED
Status: DISCONTINUED | OUTPATIENT
Start: 2020-10-08 | End: 2020-10-08 | Stop reason: SURG

## 2020-10-08 RX ORDER — SODIUM CHLORIDE 0.9 % (FLUSH) 0.9 %
10 SYRINGE (ML) INJECTION AS NEEDED
Status: DISCONTINUED | OUTPATIENT
Start: 2020-10-08 | End: 2020-10-08 | Stop reason: HOSPADM

## 2020-10-08 RX ORDER — GLYCOPYRROLATE 0.2 MG/ML
INJECTION INTRAMUSCULAR; INTRAVENOUS AS NEEDED
Status: DISCONTINUED | OUTPATIENT
Start: 2020-10-08 | End: 2020-10-08 | Stop reason: SURG

## 2020-10-08 RX ORDER — SODIUM CHLORIDE 9 MG/ML
9 INJECTION, SOLUTION INTRAVENOUS ONCE
Status: COMPLETED | OUTPATIENT
Start: 2020-10-08 | End: 2020-10-08

## 2020-10-08 RX ORDER — SODIUM CHLORIDE 0.9 % (FLUSH) 0.9 %
3 SYRINGE (ML) INJECTION EVERY 12 HOURS SCHEDULED
Status: DISCONTINUED | OUTPATIENT
Start: 2020-10-08 | End: 2020-10-08 | Stop reason: HOSPADM

## 2020-10-08 RX ORDER — IPRATROPIUM BROMIDE AND ALBUTEROL SULFATE 2.5; .5 MG/3ML; MG/3ML
3 SOLUTION RESPIRATORY (INHALATION) ONCE AS NEEDED
Status: DISCONTINUED | OUTPATIENT
Start: 2020-10-08 | End: 2020-10-08 | Stop reason: HOSPADM

## 2020-10-08 RX ADMIN — VANCOMYCIN HYDROCHLORIDE 2000 MG: 10 INJECTION, POWDER, LYOPHILIZED, FOR SOLUTION INTRAVENOUS at 11:36

## 2020-10-08 RX ADMIN — ONDANSETRON 4 MG: 2 INJECTION INTRAMUSCULAR; INTRAVENOUS at 14:38

## 2020-10-08 RX ADMIN — FENTANYL CITRATE 50 MCG: 50 INJECTION, SOLUTION INTRAMUSCULAR; INTRAVENOUS at 13:33

## 2020-10-08 RX ADMIN — PHENYLEPHRINE HYDROCHLORIDE 160 MCG: 10 INJECTION INTRAVENOUS at 13:46

## 2020-10-08 RX ADMIN — GLYCOPYRROLATE 0.6 MG: 0.2 INJECTION INTRAMUSCULAR; INTRAVENOUS at 14:39

## 2020-10-08 RX ADMIN — FENTANYL CITRATE 50 MCG: 50 INJECTION, SOLUTION INTRAMUSCULAR; INTRAVENOUS at 13:52

## 2020-10-08 RX ADMIN — IPRATROPIUM BROMIDE AND ALBUTEROL SULFATE 3 ML: 2.5; .5 SOLUTION RESPIRATORY (INHALATION) at 12:44

## 2020-10-08 RX ADMIN — SODIUM CHLORIDE: 9 INJECTION, SOLUTION INTRAVENOUS at 12:55

## 2020-10-08 RX ADMIN — LIDOCAINE HYDROCHLORIDE 0.2 ML: 10 INJECTION, SOLUTION EPIDURAL; INFILTRATION; INTRACAUDAL; PERINEURAL at 11:28

## 2020-10-08 RX ADMIN — PROPOFOL 150 MG: 10 INJECTION, EMULSION INTRAVENOUS at 13:08

## 2020-10-08 RX ADMIN — EPHEDRINE SULFATE 10 MG: 50 INJECTION INTRAMUSCULAR; INTRAVENOUS; SUBCUTANEOUS at 13:25

## 2020-10-08 RX ADMIN — FAMOTIDINE 20 MG: 20 TABLET, FILM COATED ORAL at 10:29

## 2020-10-08 RX ADMIN — EPHEDRINE SULFATE 10 MG: 50 INJECTION INTRAMUSCULAR; INTRAVENOUS; SUBCUTANEOUS at 14:28

## 2020-10-08 RX ADMIN — LIDOCAINE HYDROCHLORIDE 50 MG: 10 INJECTION, SOLUTION EPIDURAL; INFILTRATION; INTRACAUDAL; PERINEURAL at 13:08

## 2020-10-08 RX ADMIN — ATRACURIUM BESYLATE 50 MG: 10 INJECTION, SOLUTION INTRAVENOUS at 14:00

## 2020-10-08 RX ADMIN — EPHEDRINE SULFATE 15 MG: 50 INJECTION INTRAMUSCULAR; INTRAVENOUS; SUBCUTANEOUS at 13:31

## 2020-10-08 RX ADMIN — ALBUTEROL SULFATE 4 PUFF: 90 AEROSOL, METERED RESPIRATORY (INHALATION) at 14:49

## 2020-10-08 RX ADMIN — MUPIROCIN: 20 OINTMENT TOPICAL at 10:29

## 2020-10-08 RX ADMIN — SODIUM CHLORIDE 9 ML/HR: 9 INJECTION, SOLUTION INTRAVENOUS at 11:30

## 2020-10-08 RX ADMIN — NEOSTIGMINE 3 MG: 1 INJECTION INTRAVENOUS at 14:39

## 2020-10-08 NOTE — H&P
Pre-Op H&P  Joy Bueno  [id number]  1950    Chief complaint: swelling in the right upper extremity    HPI:    Patient is a 70 y.o.female who presents today for an evaluation of a right brachiocephalic AV fistula. She has end stage renal failure and is on dialysis M,W,F. Recently she experienced swelling in her right arm after they attempted to access her fistula and nephrology reported that her fistula was too deep. She presents today for surgical evaluation.     She notes some shortness of breath and notes a history of asthma. She does experience occasional chest pain when she gets upset. She denies chest pain with exertion.     Review of Systems:  General ROS: negative for chills, fever or skin lesions;  No changes since last office visit.  Neg for recent sick exposure  Cardiovascular ROS: no chest pain or dyspnea on exertion  Respiratory ROS: no cough, shortness of breath, or wheezing    Allergies:   Allergies   Allergen Reactions    Geodon [Ziprasidone Hcl] Unknown (See Comments)     PT DOESN'T REMEMBER    Haldol [Haloperidol Lactate] Unknown (See Comments)     PT DOESN'T REMEMBER    Seroquel [Quetiapine Fumarate] Unknown (See Comments)     PT DOESN'T REMEMBER       Home Meds:    No current facility-administered medications on file prior to encounter.      Current Outpatient Medications on File Prior to Encounter   Medication Sig Dispense Refill    albuterol (PROVENTIL HFA;VENTOLIN HFA) 108 (90 BASE) MCG/ACT inhaler Inhale 2 puffs Every 4 (Four) Hours As Needed for Wheezing.      carvedilol (COREG) 12.5 MG tablet Take 1 tablet by mouth 2 (Two) Times a Day With Meals.      acetaminophen (TYLENOL) 325 MG tablet Take 650 mg by mouth Every 6 (Six) Hours As Needed for Mild Pain .      aspirin 81 MG chewable tablet Chew 1 tablet Daily. 30 tablet 0    atorvastatin (LIPITOR) 80 MG tablet Take 1 tablet by mouth Every Night. 30 tablet 0    calcitriol (ROCALTROL) 0.25 MCG capsule Take 0.25 mcg by mouth Daily.       Cetirizine HCl (ZyrTEC Allergy) 10 MG capsule Take 10 mg by mouth Daily As Needed (allergies). 30 capsule     docusate sodium (COLACE) 100 MG capsule Take 1 capsule by mouth 2 (Two) Times a Day.      fluticasone (FLONASE) 50 MCG/ACT nasal spray 2 sprays into the nostril(s) as directed by provider Daily.      fluticasone-salmeterol (ADVAIR) 500-50 MCG/DOSE DISKUS Inhale 1 puff 2 (Two) Times a Day.      HYDROcodone-acetaminophen (NORCO) 5-325 MG per tablet Take 1 tablet by mouth Every 8 (Eight) Hours As Needed for Moderate Pain  (Pain). 10 tablet 0    Hydrocortisone, Perianal, (ANUSOL-HC) 2.5 % rectal cream Insert  into the rectum Daily As Needed for Hemorrhoids.      insulin aspart (novoLOG FLEXPEN) 100 UNIT/ML solution pen-injector sc pen Inject 5 Units under the skin into the appropriate area as directed 3 (Three) Times a Day With Meals.      ipratropium-albuterol (DUO-NEB) 0.5-2.5 mg/3 ml nebulizer Take 3 mL by nebulization Every 6 (Six) Hours As Needed for Wheezing.      isosorbide mononitrate (IMDUR) 30 MG 24 hr tablet Take 30 mg by mouth Daily.      Lidocaine 2 % gel Apply  topically 3 (Three) Times a Day As Needed (Mix w/LDiltiazem 2% cream and apply TID PRN to anal fissure).      meclizine (ANTIVERT) 12.5 MG tablet Take 1 tablet by mouth 3 (Three) Times a Day As Needed for dizziness. 15 tablet 0    melatonin 3 MG tablet Take 3 mg by mouth Every Night.      Multiple Vitamin (TAB-A-STAR) tablet Take 1 tablet by mouth Daily.      neomycin-polymyxin-hydrocortisone (CORTISPORIN) 3.5-14642-3 otic solution Administer 3 drops into both ears 3 (Three) Times a Day.      ondansetron (ZOFRAN) 4 MG tablet Take 4 mg by mouth Every 8 (Eight) Hours As Needed for Nausea or Vomiting.      pantoprazole (PROTONIX) 40 MG EC tablet Take 40 mg by mouth Daily.      PHARMACY CONSULT Continuous As Needed (2.0-3.0).      phenylephrine-cocoa Butter (PREPARATION H) 0.25-88.44 % suppository suppository Insert  into the rectum As Needed  for Hemorrhoids.      polyethylene glycol (MIRALAX) 17 g packet Take 17 g by mouth 2 (Two) Times a Day As Needed (Constipation).      promethazine (PHENERGAN) 25 MG tablet Take 1 tablet by mouth 3 (Three) Times a Week if Needed for Nausea or Vomiting. On dialysis days (Mon Weds Fri)      saccharomyces boulardii (FLORASTOR) 250 MG capsule Take 250 mg by mouth Daily.      sertraline (ZOLOFT) 50 MG tablet Take 50 mg by mouth Daily.      sevelamer (RENVELA) 800 MG tablet Take 800 mg by mouth 3 (Three) Times a Day With Meals. 0500, 1200 & 1700      vitamin D (ERGOCALCIFEROL) 54317 units capsule capsule Take 50,000 Units by mouth 1 (One) Time Per Week. TAKES ON WEDNESDAYS      warfarin (COUMADIN) 2 MG tablet Take 1 tablet by mouth Every Night. Indications: history of DVT (Patient taking differently: Take 2 mg by mouth Every Night. TO EQUAL 4.5 DAILY - HOLDING 3 DAYS BEFORE SURGERY  Indications: history of DVT)         PMH:   Past Medical History:   Diagnosis Date    Acute on chronic diastolic heart failure (CMS/[facility])     Acute on chronic heart failure (CMS/[facility])     Acute respiratory failure with hypercapnia (CMS/[facility]) 4/27/2019    Anemia     Anxiety     Asthma     Bilateral leg pain 10/4/2017    Boil     on head     Breakdown of skin tissue     ON BOTTOM - PT STATES UNSURE AND AID STATES DOESNT THINK OPEN     Chest wall abscess 4/5/2019    CHF (congestive heart failure) (CMS/[facility])     Chronic kidney disease     stage 4    Constipation     Coronary artery disease     Diabetes mellitus (CMS/[facility])     checks sugar once per day     Diabetes mellitus, type II (CMS/[facility]) 8/3/2018    Dialysis patient (CMS/[facility])     mwf    Dizzy     DVT of upper extremity (deep vein thrombosis) (CMS/[facility])     left    Elevated troponin 9/1/2019    Encephalopathy, metabolic 4/27/2019    Fecal impaction of rectum (CMS/[facility])     Generalized weakness 7/31/2019    GERD (gastroesophageal reflux disease)     History of Clostridium difficile infection 08/03/2018     h/o    History of respiratory failure, since off home oxygen 8/3/2018    History of transfusion     NO REACTION RECALLED     Hyperkalemia 2019    Hypertension     Morbid obesity (CMS/H[facility])     Neuropathy     On home O2     3l PRN NIGHTLY     Osteoarthritis     Sleep apnea     doesnt use machine      Slow transit constipation 2019    Tinea capitis 8/3/2018    URI (upper respiratory infection) 2019    Urinary tract infection due to extended-spectrum beta lactamase (ESBL)-producing Klebsiella 2019    Volume overload 2019    Wears glasses      PSH:    Past Surgical History:   Procedure Laterality Date    ARM LESION/CYST EXCISION N/A 2019    Procedure: ABSCESS DRAINAGE X2 ON BACK;  Surgeon: Carlos Enrique Calderón MD;  Location:  [facility];  Service: General    ARTERIOVENOUS FISTULA/SHUNT SURGERY Left 10/22/2018    Procedure: LEFT UPPER EXTREMITY ARTERIOVENOUS FISTULA CREATION;  Surgeon: Carlos Enrique Calderón MD;  Location:  [facility];  Service: Vascular    ARTERIOVENOUS FISTULA/SHUNT SURGERY Right 11/15/2019    Procedure: BRACHIOCEPHALIC AV FISTULA CREATION RIGHT;  Surgeon: Carlos Enrique Calderón MD;  Location:  [facility];  Service: Vascular    CATARACT EXTRACTION      bilat      SECTION      COLONOSCOPY N/A 2017    Procedure: COLONOSCOPY;  Surgeon: Mark I Brunner, MD;  Location:  [facility];  Service:     ENDOSCOPY N/A 2017    Procedure: ESOPHAGOGASTRODUODENOSCOPY ;  Surgeon: Velasquez Call MD;  Location:  [facility];  Service:     HERNIA REPAIR      umbilical hernia unsure about mesh     INSERTION HEMODIALYSIS CATHETER Right 10/17/2018    Procedure: HEMODIALYSIS CATHETER INSERTION;  Surgeon: Carlos Enrique Calderón MD;  Location:  [facility];  Service: General    TONSILLECTOMY         Social History:   Tobacco:   Social History     Tobacco Use   Smoking Status Former Smoker    Packs/day: 0.05    Types: Cigarettes    Quit date:     Years since quittin.7    Smokeless Tobacco Never Used   Tobacco Comment    smoked since high school but didnt smoke much and unsure when quit fully       Alcohol:     Social History     Substance and Sexual Activity   Alcohol Use No       Vitals:           There were no vitals taken for this visit.    Physical Exam:  General Appearance:    Alert, cooperative, no distress, appears stated age, obese in nature   Head:    Normocephalic, without obvious abnormality, atraumatic   Lungs:     Rales noted throughout all lung fields, respirations unlabored    Heart:   Regular rate and rhythm, S1 and S2 normal, no murmur, rub    or gallop           Genitalia:    deferred   Extremities:   Extremities normal, atraumatic, no cyanosis or edema   Skin:   Skin color, texture, turgor normal, no rashes or lesions   Neurologic:   Grossly intact   Results Review  LABS:  Lab Results   Component Value Date    WBC 7.22 10/06/2020    HGB 11.5 (L) 10/06/2020    HCT 40.3 10/06/2020    .7 (H) 10/06/2020     10/06/2020    NEUTROABS 4.50 06/30/2020    GLUCOSE 212 (H) 10/06/2020    BUN 43 (H) 10/06/2020    CREATININE 4.83 (H) 10/06/2020    EGFRIFNONA  10/06/2020      Comment:      <15 Indicative of kidney failure.    EGFRIFAFRI 11 (L) 10/06/2020     10/06/2020    K 5.0 10/06/2020    CL 95 (L) 10/06/2020    CO2 32.0 (H) 10/06/2020    MG 1.4 (L) 10/14/2019    PHOS 4.4 09/03/2019    CALCIUM 9.2 10/06/2020    ALBUMIN 3.50 10/06/2020    AST 9 10/06/2020    ALT 7 10/06/2020    BILITOT 0.4 10/06/2020    PTT 58.6 (H) 09/30/2019    INR 1.56 (H) 10/06/2020       RADIOLOGY:  Imaging Results (Last 72 Hours)       ** No results found for the last 72 hours. **            I reviewed the patient's new clinical results.    Impression:   1. End stage renal disease  2. Difficult fistula access    Plan:   1. Elevation of right brachiocephalic AV fistula   Patient was seen in the office and risks and benefits were discussed. Please see office note for details.      RAMSEY Dougherty   10/8/2020   10:31 EDT

## 2020-10-08 NOTE — OP NOTE
General Surgery Operative Note    Joy Bueno  [id number]  1950    Date of Surgery:  10/8/2020 15:10 EDT    Pre-op Diagnosis: Arteriovenous fistula malfunction            End-stage renal disease on hemodialysis    Post-op Diagnosis: Arteriovenous fistula malfunction              End-stage renal disease on hemodialysis    Procedure: Elevation right upper extremity brachiocephalic arteriovenous fistula, greater than 10 cm    Surgeon: Carlos Enrique Calderón MD    Circulator: Nichol Correa RN; Joie Beatty RN  Scrub Person: Joshua Ho  Orientee: Everman, April     Anesthesia: General anesthesia with right upper extremity regional block    Fluids: 200 mL    Estimated Blood Loss: Less than 25 mL    Complications: None apparent    History:   70-year-old lady with a right upper extremity brachiocephalic arteriovenous fistula which is too deep for dialysis to access for effective hemodialysis.       The risks and benefits of elevation of the right brachiocephalic arteriovenous fistula were rehashed.  Our discussion included but was not limited to: bleeding, infection, injury to adjacent viscera, fistula loss, inability to access, chronic pain, and medical issues from a cardiopulmonary and deep venous thrombosis standpoint.  All questions were answered and they understand and wish to proceed with surgical intervention.    Procedure:      After informed consent, the patient was taken to the operating room and placed in the supine position.  General anesthesia was induced, the right upper extremity was then prepped and draped in the standard sterile fashion. An ioban was placed on the skin. A timeout was observed.     Directly over the arteriovenous fistula outflow vein, the cephalic vein, I created a longitudinal incision.  I dissected down to the fistula itself with the electrocautery Bovie.  The fistula was encircled with a vessel loop.  I then followed the fistula distally toward the axilla.  This was  circumferentially dissected free from the surrounding tissues sharply with Metzenbaum scissors and the side branches ligated with silk suture.  This dissection was done for greater than 10 cm.  Meticulous hemostasis was obtained.  10 mL of FloSeal was instilled into the wound.  The subcutaneous tissue was then reapproximated posterior to the fistula outflow vein itself elevating this toward the level of the skin.  Two layers of 0 Vicryl were used to close the tissue posterior to the fistula.  A layer of 3-0 Vicryl in the subcutaneous elements anterior to the fistula was reapproximated.  The skin was closed with staples.  A coverederm was placed on the wound.         Sterile dressings were placed on the wound .  All lap and needle counts were reported as correct at the end of the procedure ×2.  The patient was then transferred to the PACU in stable condition.    Carlos Enrique Calderón MD     Date: 10/8/2020  Time: 15:10 EDT

## 2020-10-08 NOTE — ANESTHESIA PROCEDURE NOTES
Airway  Urgency: elective    Date/Time: 10/8/2020 1:10 PM  Airway not difficult    General Information and Staff    Patient location during procedure: OR  CRNA: Eloisa Chilel CRNA    Indications and Patient Condition  Indications for airway management: airway protection    Preoxygenated: yes  Mask difficulty assessment: 1 - vent by mask    Final Airway Details  Final airway type: supraglottic airway      Successful airway: I-gel  Size 4    Number of attempts at approach: 1    Additional Comments  LMA placed without difficulty, ventilation with assist, equal breath sounds and symmetric chest rise and fall

## 2020-10-08 NOTE — ANESTHESIA PROCEDURE NOTES
Airway  Urgency: elective    Date/Time: 10/8/2020 2:02 PM  Airway not difficult    General Information and Staff    Patient location during procedure: OR  CRNA: Eloisa Chilel CRNA    Indications and Patient Condition  Indications for airway management: airway protection    Preoxygenated: yes  MILS not maintained throughout  Mask difficulty assessment: 1 - vent by mask    Final Airway Details  Final airway type: endotracheal airway      Successful airway: ETT  Cuffed: yes   Successful intubation technique: direct laryngoscopy  Endotracheal tube insertion site: oral  Blade: Yuko  Blade size: 3  ETT size (mm): 7.0  Cormack-Lehane Classification: grade I - full view of glottis  Placement verified by: chest auscultation and capnometry   Measured from: lips  ETT/EBT  to lips (cm): 21  Number of attempts at approach: 1  Assessment: lips, teeth, and gum same as pre-op and atraumatic intubation    Additional Comments  Negative epigastric sounds, Breath sound equal bilaterally with symmetric chest rise and fall

## 2020-10-08 NOTE — ANESTHESIA PREPROCEDURE EVALUATION
Anesthesia Evaluation     Patient summary reviewed and Nursing notes reviewed                Airway   Mallampati: III  TM distance: >3 FB  Neck ROM: full  Possible difficult intubation  Dental - normal exam     Pulmonary - normal exam   (+) asthma,home oxygen (3L @ night), sleep apnea,   Cardiovascular - normal exam    (+) hypertension, CAD, DVT (coumadin tx; INR 1.56), hyperlipidemia,     ROS comment: Echo 9/19: normal EF, no significant valve disease  Moderate CV risk per cardiology    Neuro/Psych- negative ROS  GI/Hepatic/Renal/Endo    (+) morbid obesity, GERD,  renal disease ESRD and dialysis, diabetes mellitus,     Musculoskeletal     Abdominal  - normal exam    Bowel sounds: normal.   Substance History - negative use     OB/GYN negative ob/gyn ROS         Other   arthritis,                      Anesthesia Plan    ASA 4     general with block     intravenous induction     Anesthetic plan, all risks, benefits, and alternatives have been provided, discussed and informed consent has been obtained with: patient.    Plan discussed with CRNA.

## 2020-10-08 NOTE — PROGRESS NOTES
Continued Stay Note  [facility]     Patient Name: Joy Bueno  MRN: [id number]  Today's Date: 10/8/2020    Admit Date: 10/8/2020    Discharge Plan     Row Name 10/08/20 1621       Plan    Plan  return to [address]    Plan Comments  Pt will return to [facility]/home today.  A  is with pt and will assist in transportation.  [facility] transportation was arranged and  has called to request .  [facility] phone number is [phone].  Attendant will need to take pt to the [address] [facility] for .  No further needs are reported.        Discharge Codes    No documentation.       Expected Discharge Date and Time     Expected Discharge Date Expected Discharge Time    Oct 8, 2020             ROGELIO Becker

## 2020-10-08 NOTE — ANESTHESIA POSTPROCEDURE EVALUATION
Patient: Joy Bueno    Procedure Summary     Date: 10/08/20 Room / Location:  [facility]    Anesthesia Start: 1255 Anesthesia Stop:     Procedure: ELEVATION OF RIGHT BRACHIOCEPHALIC ARTERIOVENOUS FISTULA (Right ) Diagnosis:     Surgeon: Carlos Enrique Calderón MD Provider: Daquan Whitney MD    Anesthesia Type: general with block ASA Status: 4          Anesthesia Type: general with block    Vitals  Vitals Value Taken Time   /64 10/08/20 1529   Temp     Pulse 83 10/08/20 1533   Resp     SpO2 100 % 10/08/20 1533   Vitals shown include unvalidated device data.        Post Anesthesia Care and Evaluation    Patient location during evaluation: PACU  Patient participation: complete - patient participated  Level of consciousness: awake and alert  Pain score: 0  Pain management: adequate  Airway patency: patent  Anesthetic complications: No anesthetic complications  PONV Status: none  Cardiovascular status: hemodynamically stable and acceptable  Respiratory status: nonlabored ventilation, acceptable, face mask and spontaneous ventilation  Hydration status: acceptable    Comments: Patient transported to PACU on nonrebreather 10L breathing spontaneously. VSS in PACU. RN at bedside. T 98.2, /64, RR 12, HR 83, NuV2436%

## 2020-10-09 NOTE — DISCHARGE SUMMARY
Outpatient elevation of the right brachiocephalic AV fistula. For details refer to the operative note.

## 2020-10-26 ENCOUNTER — HOSPITAL ENCOUNTER (EMERGENCY)
Facility: HOSPITAL | Age: 70
Discharge: NURSING, REHAB OR LONG-TERM CARE | End: 2020-10-26
Attending: EMERGENCY MEDICINE | Admitting: EMERGENCY MEDICINE

## 2020-10-26 ENCOUNTER — APPOINTMENT (OUTPATIENT)
Dept: GENERAL RADIOLOGY | Facility: HOSPITAL | Age: 70
End: 2020-10-26

## 2020-10-26 VITALS
WEIGHT: 293 LBS | OXYGEN SATURATION: 97 % | DIASTOLIC BLOOD PRESSURE: 60 MMHG | HEART RATE: 71 BPM | TEMPERATURE: 98.2 F | SYSTOLIC BLOOD PRESSURE: 118 MMHG | BODY MASS INDEX: 50.02 KG/M2 | HEIGHT: 64 IN | RESPIRATION RATE: 18 BRPM

## 2020-10-26 DIAGNOSIS — R07.9 CHEST PAIN, UNSPECIFIED TYPE: Primary | ICD-10-CM

## 2020-10-26 LAB
ALBUMIN SERPL-MCNC: 3.7 G/DL (ref 3.5–5.2)
ALBUMIN/GLOB SERPL: 0.8 G/DL
ALP SERPL-CCNC: 102 U/L (ref 39–117)
ALT SERPL W P-5'-P-CCNC: 8 U/L (ref 1–33)
ANION GAP SERPL CALCULATED.3IONS-SCNC: 12 MMOL/L (ref 5–15)
AST SERPL-CCNC: 12 U/L (ref 1–32)
BASOPHILS # BLD AUTO: 0.05 10*3/MM3 (ref 0–0.2)
BASOPHILS NFR BLD AUTO: 0.5 % (ref 0–1.5)
BILIRUB SERPL-MCNC: 0.4 MG/DL (ref 0–1.2)
BUN SERPL-MCNC: 54 MG/DL (ref 8–23)
BUN/CREAT SERPL: 11.7 (ref 7–25)
CALCIUM SPEC-SCNC: 9.2 MG/DL (ref 8.6–10.5)
CHLORIDE SERPL-SCNC: 99 MMOL/L (ref 98–107)
CO2 SERPL-SCNC: 28 MMOL/L (ref 22–29)
CREAT SERPL-MCNC: 4.63 MG/DL (ref 0.57–1)
DEPRECATED RDW RBC AUTO: 61.1 FL (ref 37–54)
EOSINOPHIL # BLD AUTO: 0.37 10*3/MM3 (ref 0–0.4)
EOSINOPHIL NFR BLD AUTO: 3.4 % (ref 0.3–6.2)
ERYTHROCYTE [DISTWIDTH] IN BLOOD BY AUTOMATED COUNT: 16.1 % (ref 12.3–15.4)
GFR SERPL CREATININE-BSD FRML MDRD: 11 ML/MIN/1.73
GFR SERPL CREATININE-BSD FRML MDRD: ABNORMAL ML/MIN/{1.73_M2}
GLOBULIN UR ELPH-MCNC: 4.4 GM/DL
GLUCOSE SERPL-MCNC: 190 MG/DL (ref 65–99)
HCT VFR BLD AUTO: 39.3 % (ref 34–46.6)
HGB BLD-MCNC: 11.3 G/DL (ref 12–15.9)
HOLD SPECIMEN: NORMAL
HOLD SPECIMEN: NORMAL
IMM GRANULOCYTES # BLD AUTO: 0.25 10*3/MM3 (ref 0–0.05)
IMM GRANULOCYTES NFR BLD AUTO: 2.3 % (ref 0–0.5)
INR PPP: 1.78 (ref 0.85–1.16)
LYMPHOCYTES # BLD AUTO: 1.26 10*3/MM3 (ref 0.7–3.1)
LYMPHOCYTES NFR BLD AUTO: 11.7 % (ref 19.6–45.3)
MCH RBC QN AUTO: 29.4 PG (ref 26.6–33)
MCHC RBC AUTO-ENTMCNC: 28.8 G/DL (ref 31.5–35.7)
MCV RBC AUTO: 102.1 FL (ref 79–97)
MONOCYTES # BLD AUTO: 0.48 10*3/MM3 (ref 0.1–0.9)
MONOCYTES NFR BLD AUTO: 4.5 % (ref 5–12)
NEUTROPHILS NFR BLD AUTO: 77.6 % (ref 42.7–76)
NEUTROPHILS NFR BLD AUTO: 8.33 10*3/MM3 (ref 1.7–7)
NRBC BLD AUTO-RTO: 0 /100 WBC (ref 0–0.2)
PLATELET # BLD AUTO: 176 10*3/MM3 (ref 140–450)
PMV BLD AUTO: 11.2 FL (ref 6–12)
POTASSIUM SERPL-SCNC: 5.5 MMOL/L (ref 3.5–5.2)
PROT SERPL-MCNC: 8.1 G/DL (ref 6–8.5)
PROTHROMBIN TIME: 20.4 SECONDS (ref 11.5–14)
RBC # BLD AUTO: 3.85 10*6/MM3 (ref 3.77–5.28)
SODIUM SERPL-SCNC: 139 MMOL/L (ref 136–145)
TROPONIN T SERPL-MCNC: 0.1 NG/ML (ref 0–0.03)
TROPONIN T SERPL-MCNC: 0.1 NG/ML (ref 0–0.03)
WBC # BLD AUTO: 10.74 10*3/MM3 (ref 3.4–10.8)
WHOLE BLOOD HOLD SPECIMEN: NORMAL
WHOLE BLOOD HOLD SPECIMEN: NORMAL

## 2020-10-26 PROCEDURE — 85610 PROTHROMBIN TIME: CPT | Performed by: EMERGENCY MEDICINE

## 2020-10-26 PROCEDURE — 71045 X-RAY EXAM CHEST 1 VIEW: CPT

## 2020-10-26 PROCEDURE — 84484 ASSAY OF TROPONIN QUANT: CPT | Performed by: EMERGENCY MEDICINE

## 2020-10-26 PROCEDURE — 85025 COMPLETE CBC W/AUTO DIFF WBC: CPT | Performed by: EMERGENCY MEDICINE

## 2020-10-26 PROCEDURE — 99284 EMERGENCY DEPT VISIT MOD MDM: CPT

## 2020-10-26 PROCEDURE — 80053 COMPREHEN METABOLIC PANEL: CPT | Performed by: EMERGENCY MEDICINE

## 2020-10-26 PROCEDURE — 93005 ELECTROCARDIOGRAM TRACING: CPT | Performed by: EMERGENCY MEDICINE

## 2020-10-26 RX ORDER — SODIUM CHLORIDE 0.9 % (FLUSH) 0.9 %
10 SYRINGE (ML) INJECTION AS NEEDED
Status: DISCONTINUED | OUTPATIENT
Start: 2020-10-26 | End: 2020-10-26 | Stop reason: HOSPADM

## 2020-10-26 RX ORDER — NITROGLYCERIN 0.4 MG/1
0.4 TABLET SUBLINGUAL
Status: DISCONTINUED | OUTPATIENT
Start: 2020-10-26 | End: 2020-10-26 | Stop reason: HOSPADM

## 2020-10-26 NOTE — PROGRESS NOTES
Discharge Planning Assessment  [facility]     Patient Name: Joy Bueno  MRN: [id number]  Today's Date: 10/26/2020    Admit Date: 10/26/2020    Discharge Needs Assessment    No documentation.       Discharge Plan     Row Name 10/26/20 1409       Plan    Plan  discharge    Plan Comments  Pt needing transportation back to her NH, [address] after being seen in ER. CM called [facility] and they will come and get her and take her home. ER [facility]ha was in formed    Final Discharge Disposition Code  01 - home or self-care        Continued Care and Services - Admitted Since 10/26/2020    Coordination has not been started for this encounter.         Demographic Summary    No documentation.       Functional Status    No documentation.       Psychosocial    No documentation.       Abuse/Neglect    No documentation.       Legal    No documentation.       Substance Abuse    No documentation.       Patient Forms    No documentation.           Zuleima Johnson RN

## 2020-10-26 NOTE — ED PROVIDER NOTES
Subjective   Mrs. Bueno presents from dialysis with chest pain.  She reports that she just did not feel very good and she got up this morning.  She was about an hour into her dialysis treatment when she began having pain in her chest.  She denies any shortness of breath, cough, or fever.  She tells me the pain is still there.  She is not been able to identify any aggravating or alleviating factors.  She denies any cardiac history.  She is on Coumadin for history of DVT.  She was hospitalized a couple of weeks ago and her AV shunt was revised and elevated.      History provided by:  Patient  Chest Pain  Pain location:  Substernal area  Pain quality: dull    Pain radiates to:  Does not radiate  Pain severity:  Moderate  Onset quality:  Gradual  Timing:  Constant  Progression:  Unchanged  Chronicity: She tells me she gets this from time to time but has never been evaluated for it.  Context comment:  While receiving hemodialysis.  Relieved by:  Nothing  Worsened by:  Nothing  Associated symptoms: no abdominal pain, no back pain, no cough, no diaphoresis, no dizziness, no fever, no nausea, no shortness of breath, no vomiting and no weakness        Review of Systems   Constitutional: Negative for diaphoresis and fever.   HENT: Negative for congestion and rhinorrhea.    Respiratory: Negative for cough and shortness of breath.    Cardiovascular: Positive for chest pain. Negative for leg swelling.   Gastrointestinal: Negative for abdominal pain, nausea and vomiting.   Musculoskeletal: Negative for back pain.   Neurological: Negative for dizziness and weakness.   All other systems reviewed and are negative.      Past Medical History:   Diagnosis Date   • Acute on chronic diastolic heart failure (CMS/HCC)    • Acute on chronic heart failure (CMS/HCC)    • Acute respiratory failure with hypercapnia (CMS/HCC) 4/27/2019   • Anemia    • Anxiety    • Asthma    • Bilateral leg pain 10/4/2017   • Boil     on head    • Breakdown of  skin tissue     ON BOTTOM - PT STATES UNSURE AND AID STATES DOESNT THINK OPEN    • Chest wall abscess 4/5/2019   • CHF (congestive heart failure) (CMS/HCC)    • Chronic kidney disease     stage 4   • Constipation    • Coronary artery disease    • Diabetes mellitus (CMS/HCC)     checks sugar once per day    • Diabetes mellitus, type II (CMS/HCC) 8/3/2018   • Dialysis patient (CMS/HCC)     mwf   • Dizzy    • DVT of upper extremity (deep vein thrombosis) (CMS/HCC)     left   • Elevated troponin 9/1/2019   • Encephalopathy, metabolic 4/27/2019   • Fecal impaction of rectum (CMS/HCC)    • Generalized weakness 7/31/2019   • GERD (gastroesophageal reflux disease)    • History of Clostridium difficile infection 08/03/2018    h/o   • History of respiratory failure, since off home oxygen 8/3/2018   • History of transfusion     NO REACTION RECALLED    • Hyperkalemia 7/31/2019   • Hypertension    • Morbid obesity (CMS/HCC)    • Neuropathy    • On home O2     3l PRN NIGHTLY    • Osteoarthritis    • Sleep apnea     doesnt use machine     • Slow transit constipation 2/20/2019   • Tinea capitis 8/3/2018   • URI (upper respiratory infection) 4/5/2019   • Urinary tract infection due to extended-spectrum beta lactamase (ESBL)-producing Klebsiella 2/20/2019   • Volume overload 4/6/2019   • Wears glasses        Allergies   Allergen Reactions   • Geodon [Ziprasidone Hcl] Unknown (See Comments)     PT DOESN'T REMEMBER   • Haldol [Haloperidol Lactate] Unknown (See Comments)     PT DOESN'T REMEMBER   • Seroquel [Quetiapine Fumarate] Unknown (See Comments)     PT DOESN'T REMEMBER       Past Surgical History:   Procedure Laterality Date   • ARM LESION/CYST EXCISION N/A 8/22/2019    Procedure: ABSCESS DRAINAGE X2 ON BACK;  Surgeon: Carlos Enrique Calderón MD;  [facility];  Service: General   • ARTERIOVENOUS FISTULA/SHUNT SURGERY Left 10/22/2018    Procedure: LEFT UPPER EXTREMITY ARTERIOVENOUS FISTULA CREATION;  Surgeon: Stephane  Carlos Enrique Lee MD;  Location:  [facility];  Service: Vascular   • ARTERIOVENOUS FISTULA/SHUNT SURGERY Right 11/15/2019    Procedure: BRACHIOCEPHALIC AV FISTULA CREATION RIGHT;  Surgeon: Carlos Enrique Calderón MD;  Location:  [facility];  Service: Vascular   • ARTERIOVENOUS FISTULA/SHUNT SURGERY Right 10/8/2020    Procedure: ELEVATION OF RIGHT BRACHIOCEPHALIC ARTERIOVENOUS FISTULA;  Surgeon: Carlos Enrique Calderón MD;  Location:  [facility];  Service: Vascular;  Laterality: Right;   • CATARACT EXTRACTION      bilat    •  SECTION     • COLONOSCOPY N/A 2017    Procedure: COLONOSCOPY;  Surgeon: Mark I Brunner, MD;  Location:  [facility];  Service:    • ENDOSCOPY N/A 2017    Procedure: ESOPHAGOGASTRODUODENOSCOPY ;  Surgeon: Velasquez Call MD;  Location:  [facility];  Service:    • HERNIA REPAIR      umbilical hernia unsure about mesh    • INSERTION HEMODIALYSIS CATHETER Right 10/17/2018    Procedure: HEMODIALYSIS CATHETER INSERTION;  Surgeon: Carlos Enrique Calderón MD;  Location:  [facility];  Service: General   • TONSILLECTOMY         Family History   Problem Relation Age of Onset   • Cardiomyopathy Mother    • Stroke Father    • Diabetes Father        Social History     Socioeconomic History   • Marital status:      Spouse name: Not on file   • Number of children: Not on file   • Years of education: Not on file   • Highest education level: Not on file   Tobacco Use   • Smoking status: Former Smoker     Packs/day: 0.05     Types: Cigarettes     Quit date:      Years since quittin.8   • Smokeless tobacco: Never Used   • Tobacco comment: smoked since high school but didnt smoke much and unsure when quit fully    Substance and Sexual Activity   • Alcohol use: No   • Drug use: No   • Sexual activity: Defer   Social History Narrative    Mrs. Bueno is a 68 year old AA  female. She lives alone in [place] but has been in rehab for some time. She has a daughter. She does not  have an advanced directive.           Objective   Physical Exam  Vitals signs and nursing note reviewed.   Constitutional:       General: She is not in acute distress.     Appearance: She is obese.      Comments: She is a pleasant lady in no distress   HENT:      Head: Normocephalic and atraumatic.   Eyes:      General: No scleral icterus.     Conjunctiva/sclera: Conjunctivae normal.   Neck:      Musculoskeletal: Normal range of motion and neck supple.      Comments: No JVD is seen although exam is limited due to body habitus  Cardiovascular:      Rate and Rhythm: Normal rate and regular rhythm.      Heart sounds: No murmur. No friction rub.   Pulmonary:      Effort: Pulmonary effort is normal.      Breath sounds: Normal breath sounds. No wheezing or rales.   Abdominal:      Tenderness: There is no abdominal tenderness. There is no guarding or rebound.   Musculoskeletal:      Right lower leg: No edema.      Left lower leg: No edema.   Skin:     General: Skin is warm and dry.      Findings: No rash.   Neurological:      General: No focal deficit present.      Mental Status: She is alert and oriented to person, place, and time.      Coordination: Coordination normal.   Psychiatric:         Mood and Affect: Mood normal.         Behavior: Behavior normal.         Thought Content: Thought content normal.         Procedures           ED Course  ED Course as of Oct 26 1558   Mon Oct 26, 2020   1258 Mrs. Bueno is comfortable.  I spoke with her about findings.  The etiology of her chest pain is not clear although it lasted for almost an hour and second troponin is unchanged.  Chest x-ray does not show any problems with her catheter.  Heart score is 3    [DT]      ED Course User Index  [DT] Navi Zafar MD                                           MDM  Number of Diagnoses or Management Options  Chest pain, unspecified type: new and requires workup     Amount and/or Complexity of Data Reviewed  Clinical lab tests:  reviewed and ordered  Tests in the radiology section of CPT®: ordered and reviewed  Review and summarize past medical records: yes  Independent visualization of images, tracings, or specimens: yes    Patient Progress  Patient progress: improved      Final diagnoses:   Chest pain, unspecified type            Navi Zafar MD  10/26/20 1556

## 2020-11-03 LAB
QT INTERVAL: 428 MS
QTC INTERVAL: 452 MS

## 2020-11-04 ENCOUNTER — HOSPITAL ENCOUNTER (EMERGENCY)
Facility: HOSPITAL | Age: 70
Discharge: HOME | End: 2020-11-04
Attending: EMERGENCY MEDICINE | Admitting: EMERGENCY MEDICINE

## 2020-11-04 VITALS
OXYGEN SATURATION: 93 % | TEMPERATURE: 98.6 F | WEIGHT: 293 LBS | HEIGHT: 64 IN | RESPIRATION RATE: 20 BRPM | SYSTOLIC BLOOD PRESSURE: 161 MMHG | BODY MASS INDEX: 50.02 KG/M2 | DIASTOLIC BLOOD PRESSURE: 71 MMHG | HEART RATE: 80 BPM

## 2020-11-04 DIAGNOSIS — R11.0 NAUSEA: Primary | ICD-10-CM

## 2020-11-04 DIAGNOSIS — N18.6 ESRD (END STAGE RENAL DISEASE) (HCC): ICD-10-CM

## 2020-11-04 DIAGNOSIS — R10.13 DYSPEPSIA: ICD-10-CM

## 2020-11-04 LAB
ALBUMIN SERPL-MCNC: 3.6 G/DL (ref 3.5–5.2)
ALBUMIN/GLOB SERPL: 0.9 G/DL
ALP SERPL-CCNC: 101 U/L (ref 39–117)
ALT SERPL W P-5'-P-CCNC: 8 U/L (ref 1–33)
ANION GAP SERPL CALCULATED.3IONS-SCNC: 10 MMOL/L (ref 5–15)
AST SERPL-CCNC: 11 U/L (ref 1–32)
BASOPHILS # BLD AUTO: 0.04 10*3/MM3 (ref 0–0.2)
BASOPHILS NFR BLD AUTO: 0.5 % (ref 0–1.5)
BILIRUB SERPL-MCNC: 0.4 MG/DL (ref 0–1.2)
BUN SERPL-MCNC: 40 MG/DL (ref 8–23)
BUN/CREAT SERPL: 10 (ref 7–25)
CALCIUM SPEC-SCNC: 9.1 MG/DL (ref 8.6–10.5)
CHLORIDE SERPL-SCNC: 98 MMOL/L (ref 98–107)
CO2 SERPL-SCNC: 31 MMOL/L (ref 22–29)
CREAT SERPL-MCNC: 4.01 MG/DL (ref 0.57–1)
DEPRECATED RDW RBC AUTO: 60.6 FL (ref 37–54)
EOSINOPHIL # BLD AUTO: 0.45 10*3/MM3 (ref 0–0.4)
EOSINOPHIL NFR BLD AUTO: 5.6 % (ref 0.3–6.2)
ERYTHROCYTE [DISTWIDTH] IN BLOOD BY AUTOMATED COUNT: 16.6 % (ref 12.3–15.4)
GFR SERPL CREATININE-BSD FRML MDRD: 13 ML/MIN/1.73
GFR SERPL CREATININE-BSD FRML MDRD: ABNORMAL ML/MIN/{1.73_M2}
GLOBULIN UR ELPH-MCNC: 3.9 GM/DL
GLUCOSE SERPL-MCNC: 158 MG/DL (ref 65–99)
HCT VFR BLD AUTO: 36.3 % (ref 34–46.6)
HGB BLD-MCNC: 10.5 G/DL (ref 12–15.9)
IMM GRANULOCYTES # BLD AUTO: 0.09 10*3/MM3 (ref 0–0.05)
IMM GRANULOCYTES NFR BLD AUTO: 1.1 % (ref 0–0.5)
INR PPP: 1.68 (ref 0.85–1.16)
LIPASE SERPL-CCNC: 51 U/L (ref 13–60)
LYMPHOCYTES # BLD AUTO: 1.24 10*3/MM3 (ref 0.7–3.1)
LYMPHOCYTES NFR BLD AUTO: 15.3 % (ref 19.6–45.3)
MAGNESIUM SERPL-MCNC: 1.8 MG/DL (ref 1.6–2.4)
MCH RBC QN AUTO: 29.2 PG (ref 26.6–33)
MCHC RBC AUTO-ENTMCNC: 28.9 G/DL (ref 31.5–35.7)
MCV RBC AUTO: 100.8 FL (ref 79–97)
MONOCYTES # BLD AUTO: 0.55 10*3/MM3 (ref 0.1–0.9)
MONOCYTES NFR BLD AUTO: 6.8 % (ref 5–12)
NEUTROPHILS NFR BLD AUTO: 5.73 10*3/MM3 (ref 1.7–7)
NEUTROPHILS NFR BLD AUTO: 70.7 % (ref 42.7–76)
NRBC BLD AUTO-RTO: 0.4 /100 WBC (ref 0–0.2)
PHOSPHATE SERPL-MCNC: 3.6 MG/DL (ref 2.5–4.5)
PLATELET # BLD AUTO: 174 10*3/MM3 (ref 140–450)
PMV BLD AUTO: 10.3 FL (ref 6–12)
POTASSIUM SERPL-SCNC: 4.6 MMOL/L (ref 3.5–5.2)
PROT SERPL-MCNC: 7.5 G/DL (ref 6–8.5)
PROTHROMBIN TIME: 19.5 SECONDS (ref 11.5–14)
RBC # BLD AUTO: 3.6 10*6/MM3 (ref 3.77–5.28)
SODIUM SERPL-SCNC: 139 MMOL/L (ref 136–145)
TROPONIN T SERPL-MCNC: 0.08 NG/ML (ref 0–0.03)
TROPONIN T SERPL-MCNC: 0.09 NG/ML (ref 0–0.03)
WBC # BLD AUTO: 8.1 10*3/MM3 (ref 3.4–10.8)

## 2020-11-04 PROCEDURE — 93005 ELECTROCARDIOGRAM TRACING: CPT | Performed by: EMERGENCY MEDICINE

## 2020-11-04 PROCEDURE — 85610 PROTHROMBIN TIME: CPT | Performed by: EMERGENCY MEDICINE

## 2020-11-04 PROCEDURE — 83690 ASSAY OF LIPASE: CPT | Performed by: EMERGENCY MEDICINE

## 2020-11-04 PROCEDURE — 84100 ASSAY OF PHOSPHORUS: CPT | Performed by: EMERGENCY MEDICINE

## 2020-11-04 PROCEDURE — 96375 TX/PRO/DX INJ NEW DRUG ADDON: CPT

## 2020-11-04 PROCEDURE — 80053 COMPREHEN METABOLIC PANEL: CPT | Performed by: EMERGENCY MEDICINE

## 2020-11-04 PROCEDURE — 99284 EMERGENCY DEPT VISIT MOD MDM: CPT

## 2020-11-04 PROCEDURE — 85025 COMPLETE CBC W/AUTO DIFF WBC: CPT | Performed by: EMERGENCY MEDICINE

## 2020-11-04 PROCEDURE — 96374 THER/PROPH/DIAG INJ IV PUSH: CPT

## 2020-11-04 PROCEDURE — 25010000002 ONDANSETRON PER 1 MG: Performed by: EMERGENCY MEDICINE

## 2020-11-04 PROCEDURE — 36415 COLL VENOUS BLD VENIPUNCTURE: CPT

## 2020-11-04 PROCEDURE — 84484 ASSAY OF TROPONIN QUANT: CPT | Performed by: EMERGENCY MEDICINE

## 2020-11-04 PROCEDURE — 83735 ASSAY OF MAGNESIUM: CPT | Performed by: EMERGENCY MEDICINE

## 2020-11-04 RX ORDER — LIDOCAINE HYDROCHLORIDE 20 MG/ML
15 SOLUTION OROPHARYNGEAL ONCE
Status: COMPLETED | OUTPATIENT
Start: 2020-11-04 | End: 2020-11-04

## 2020-11-04 RX ORDER — FAMOTIDINE 20 MG/1
20 TABLET, FILM COATED ORAL DAILY
Qty: 15 TABLET | Refills: 0 | Status: SHIPPED | OUTPATIENT
Start: 2020-11-04 | End: 2021-02-17 | Stop reason: HOSPADM

## 2020-11-04 RX ORDER — ALUMINA, MAGNESIA, AND SIMETHICONE 2400; 2400; 240 MG/30ML; MG/30ML; MG/30ML
15 SUSPENSION ORAL ONCE
Status: COMPLETED | OUTPATIENT
Start: 2020-11-04 | End: 2020-11-04

## 2020-11-04 RX ORDER — ONDANSETRON 2 MG/ML
4 INJECTION INTRAMUSCULAR; INTRAVENOUS ONCE
Status: COMPLETED | OUTPATIENT
Start: 2020-11-04 | End: 2020-11-04

## 2020-11-04 RX ORDER — ONDANSETRON 4 MG/1
4 TABLET, ORALLY DISINTEGRATING ORAL EVERY 6 HOURS PRN
Qty: 15 TABLET | Refills: 0 | Status: SHIPPED | OUTPATIENT
Start: 2020-11-04 | End: 2021-02-17 | Stop reason: HOSPADM

## 2020-11-04 RX ORDER — CETIRIZINE HYDROCHLORIDE 10 MG/1
10 TABLET ORAL DAILY
COMMUNITY
End: 2021-02-17 | Stop reason: HOSPADM

## 2020-11-04 RX ORDER — SODIUM CHLORIDE 0.9 % (FLUSH) 0.9 %
10 SYRINGE (ML) INJECTION AS NEEDED
Status: DISCONTINUED | OUTPATIENT
Start: 2020-11-04 | End: 2020-11-04 | Stop reason: HOSPADM

## 2020-11-04 RX ORDER — FAMOTIDINE 10 MG/ML
20 INJECTION, SOLUTION INTRAVENOUS ONCE
Status: COMPLETED | OUTPATIENT
Start: 2020-11-04 | End: 2020-11-04

## 2020-11-04 RX ADMIN — SODIUM CHLORIDE, PRESERVATIVE FREE 10 ML: 5 INJECTION INTRAVENOUS at 10:53

## 2020-11-04 RX ADMIN — LIDOCAINE HYDROCHLORIDE 15 ML: 20 SOLUTION ORAL; TOPICAL at 10:49

## 2020-11-04 RX ADMIN — ALUMINUM HYDROXIDE, MAGNESIUM HYDROXIDE, AND DIMETHICONE 15 ML: 400; 400; 40 SUSPENSION ORAL at 10:49

## 2020-11-04 RX ADMIN — FAMOTIDINE 20 MG: 10 INJECTION INTRAVENOUS at 10:54

## 2020-11-04 RX ADMIN — ONDANSETRON 4 MG: 2 INJECTION INTRAMUSCULAR; INTRAVENOUS at 10:52

## 2020-11-04 NOTE — ED PROVIDER NOTES
Subjective   Pt presents with nausea and mild epigastric pain for the last few days.  Some generalized weakness as well.  Today the nausea increased during dialysis so her session was cut short and she was transported here.  She denies fever, vomiting, diarrhea, chest pain, dyspnea or cough.        History provided by:  Patient      Review of Systems   Constitutional: Negative for fever.   HENT: Negative for rhinorrhea and sore throat.    Respiratory: Negative for cough and shortness of breath.    Cardiovascular: Negative for chest pain.   Gastrointestinal: Positive for abdominal pain and nausea. Negative for constipation, diarrhea and vomiting.   Neurological: Positive for weakness.   All other systems reviewed and are negative.      Past Medical History:   Diagnosis Date   • Acute on chronic diastolic heart failure (CMS/[facility])    • Acute on chronic heart failure (CMS/[facility])    • Acute respiratory failure with hypercapnia (CMS/[facility]) 4/27/2019   • Anemia    • Anxiety    • Asthma    • Bilateral leg pain 10/4/2017   • Boil     on head    • Breakdown of skin tissue     ON BOTTOM - PT STATES UNSURE AND AID STATES DOESNT THINK OPEN    • Chest wall abscess 4/5/2019   • CHF (congestive heart failure) (CMS/[facility])    • Chronic kidney disease     stage 4   • Constipation    • Coronary artery disease    • Diabetes mellitus (CMS/[facility])     checks sugar once per day    • Diabetes mellitus, type II (CMS/[facility]) 8/3/2018   • Dialysis patient (CMS/[facility])     mwf   • Dizzy    • DVT of upper extremity (deep vein thrombosis) (CMS/[facility])     left   • Elevated troponin 9/1/2019   • Encephalopathy, metabolic 4/27/2019   • Fecal impaction of rectum (CMS/[facility])    • Generalized weakness 7/31/2019   • GERD (gastroesophageal reflux disease)    • History of Clostridium difficile infection 08/03/2018    h/o   • History of respiratory failure, since off home oxygen 8/3/2018   • History of transfusion     NO REACTION RECALLED    • Hyperkalemia 7/31/2019   •  Hypertension    • Morbid obesity (CMS/HCC)    • Neuropathy    • On home O2     3l PRN NIGHTLY    • Osteoarthritis    • Sleep apnea     doesnt use machine     • Slow transit constipation 2019   • Tinea capitis 8/3/2018   • URI (upper respiratory infection) 2019   • Urinary tract infection due to extended-spectrum beta lactamase (ESBL)-producing Klebsiella 2019   • Volume overload 2019   • Wears glasses        Allergies   Allergen Reactions   • Geodon [Ziprasidone Hcl] Unknown (See Comments)     PT DOESN'T REMEMBER   • Haldol [Haloperidol Lactate] Unknown (See Comments)     PT DOESN'T REMEMBER   • Seroquel [Quetiapine Fumarate] Unknown (See Comments)     PT DOESN'T REMEMBER       Past Surgical History:   Procedure Laterality Date   • ARM LESION/CYST EXCISION N/A 2019    Procedure: ABSCESS DRAINAGE X2 ON BACK;  Surgeon: Carlos Enrique Calderón MD;  Location:  [facility];  Service: General   • ARTERIOVENOUS FISTULA/SHUNT SURGERY Left 10/22/2018    Procedure: LEFT UPPER EXTREMITY ARTERIOVENOUS FISTULA CREATION;  Surgeon: Carlos Enrique Calderón MD;  Location:  [facility];  Service: Vascular   • ARTERIOVENOUS FISTULA/SHUNT SURGERY Right 11/15/2019    Procedure: BRACHIOCEPHALIC AV FISTULA CREATION RIGHT;  Surgeon: Carlos Enrique Calderón MD;  Location:  [facility];  Service: Vascular   • ARTERIOVENOUS FISTULA/SHUNT SURGERY Right 10/8/2020    Procedure: ELEVATION OF RIGHT BRACHIOCEPHALIC ARTERIOVENOUS FISTULA;  Surgeon: Carlos Enrique aClderón MD;  Location:  [facility];  Service: Vascular;  Laterality: Right;   • CATARACT EXTRACTION      bilat    •  SECTION     • COLONOSCOPY N/A 2017    Procedure: COLONOSCOPY;  Surgeon: Mark I Brunner, MD;  Location:  [facility];  Service:    • ENDOSCOPY N/A 2017    Procedure: ESOPHAGOGASTRODUODENOSCOPY ;  Surgeon: Velasquez Call MD;  Location:  [facility];  Service:    • HERNIA REPAIR      umbilical hernia unsure about mesh    • INSERTION  HEMODIALYSIS CATHETER Right 10/17/2018    Procedure: HEMODIALYSIS CATHETER INSERTION;  Surgeon: Carlos Enrique Calderón MD;  Location: [facility];  Service: General   • TONSILLECTOMY         Family History   Problem Relation Age of Onset   • Cardiomyopathy Mother    • Stroke Father    • Diabetes Father        Social History     Socioeconomic History   • Marital status:      Spouse name: Not on file   • Number of children: Not on file   • Years of education: Not on file   • Highest education level: Not on file   Tobacco Use   • Smoking status: Former Smoker     Packs/day: 0.05     Types: Cigarettes     Quit date:      Years since quittin.8   • Smokeless tobacco: Never Used   • Tobacco comment: smoked since high school but didnt smoke much and unsure when quit fully    Substance and Sexual Activity   • Alcohol use: No   • Drug use: No   • Sexual activity: Defer   Social History Narrative    Mrs. Bueno is a 68 year old AA  female. She lives alone in [place] but has been in rehab for some time. She has a daughter. She does not have an advanced directive.           Objective   Physical Exam  Vitals signs and nursing note reviewed.   Constitutional:       General: She is not in acute distress.     Appearance: Normal appearance. She is not ill-appearing.   HENT:      Head: Normocephalic and atraumatic.      Mouth/Throat:      Mouth: Mucous membranes are moist.   Eyes:      General: No scleral icterus.        Right eye: No discharge.         Left eye: No discharge.      Conjunctiva/sclera: Conjunctivae normal.   Neck:      Musculoskeletal: Normal range of motion and neck supple.   Cardiovascular:      Rate and Rhythm: Normal rate and regular rhythm.      Heart sounds: No murmur.   Pulmonary:      Effort: Pulmonary effort is normal. No respiratory distress.      Breath sounds: Normal breath sounds. No wheezing.   Abdominal:      General: Bowel sounds are normal. There is no distension.       Palpations: Abdomen is soft.      Tenderness: There is no abdominal tenderness. There is no guarding or rebound.   Musculoskeletal: Normal range of motion.         General: No swelling.   Skin:     General: Skin is warm and dry.      Findings: No rash.   Neurological:      General: No focal deficit present.      Mental Status: She is alert. Mental status is at baseline.   Psychiatric:         Mood and Affect: Mood normal.         Behavior: Behavior normal.         Thought Content: Thought content normal.         Procedures           ED Course         EKG SR 1st deg block.  Labs c/w ESRD but not metabolic emergencies.  Lipase negative.  Troponin a touch high but not uncommon in ESRD patients.  It is lower than priors and the second is lower than the first.  Her symptoms resolved with GI meds.  Patient stable on serial rechecks.  Discussed findings, concerns, plan of care, expected course, reasons to return and followup.  Provided the opportunity to ask questions.                                    MDM  Number of Diagnoses or Management Options  Dyspepsia:   ESRD (end stage renal disease) (CMS/[facility]):   Nausea:      Amount and/or Complexity of Data Reviewed  Clinical lab tests: ordered and reviewed  Decide to obtain previous medical records or to obtain history from someone other than the patient: yes  Review and summarize past medical records: yes  Independent visualization of images, tracings, or specimens: yes        Final diagnoses:   Nausea   Dyspepsia   ESRD (end stage renal disease) (CMS/[facility])            Car Sharpe MD  11/04/20 6730

## 2020-11-09 LAB
QT INTERVAL: 404 MS
QT INTERVAL: 406 MS
QTC INTERVAL: 447 MS
QTC INTERVAL: 451 MS

## 2020-12-26 ENCOUNTER — HOSPITAL ENCOUNTER (INPATIENT)
Facility: HOSPITAL | Age: 70
LOS: 53 days | Discharge: NURSING, REHAB OR LONG-TERM CARE | End: 2021-02-17
Attending: EMERGENCY MEDICINE | Admitting: INTERNAL MEDICINE

## 2020-12-26 ENCOUNTER — APPOINTMENT (OUTPATIENT)
Dept: CT IMAGING | Facility: HOSPITAL | Age: 70
End: 2020-12-26

## 2020-12-26 ENCOUNTER — APPOINTMENT (OUTPATIENT)
Dept: GENERAL RADIOLOGY | Facility: HOSPITAL | Age: 70
End: 2020-12-26

## 2020-12-26 ENCOUNTER — APPOINTMENT (OUTPATIENT)
Dept: NEPHROLOGY | Facility: HOSPITAL | Age: 70
End: 2020-12-26

## 2020-12-26 DIAGNOSIS — N39.0 URINARY TRACT INFECTION WITHOUT HEMATURIA, SITE UNSPECIFIED: ICD-10-CM

## 2020-12-26 DIAGNOSIS — F41.9 ANXIETY: ICD-10-CM

## 2020-12-26 DIAGNOSIS — R49.9 VOICE IMPAIRMENT: ICD-10-CM

## 2020-12-26 DIAGNOSIS — R40.0 SOMNOLENCE: ICD-10-CM

## 2020-12-26 DIAGNOSIS — N18.6 CHRONIC KIDNEY DISEASE WITH END STAGE RENAL FAILURE ON DIALYSIS (HCC): ICD-10-CM

## 2020-12-26 DIAGNOSIS — Z99.2 CHRONIC KIDNEY DISEASE WITH END STAGE RENAL FAILURE ON DIALYSIS (HCC): ICD-10-CM

## 2020-12-26 DIAGNOSIS — R13.13 DYSPHAGIA, PHARYNGEAL PHASE: ICD-10-CM

## 2020-12-26 DIAGNOSIS — E87.29 RESPIRATORY ACIDOSIS: ICD-10-CM

## 2020-12-26 DIAGNOSIS — J96.22 ACUTE ON CHRONIC RESPIRATORY FAILURE WITH HYPERCAPNIA (HCC): Primary | ICD-10-CM

## 2020-12-26 PROBLEM — N17.9 ACUTE ON CHRONIC RENAL FAILURE (HCC): Status: ACTIVE | Noted: 2020-12-26

## 2020-12-26 PROBLEM — N18.9 ACUTE ON CHRONIC RENAL FAILURE (HCC): Status: ACTIVE | Noted: 2020-12-26

## 2020-12-26 LAB
ALBUMIN SERPL-MCNC: 2.8 G/DL (ref 3.5–5.2)
ALBUMIN/GLOB SERPL: 0.7 G/DL
ALP SERPL-CCNC: 73 U/L (ref 39–117)
ALT SERPL W P-5'-P-CCNC: 5 U/L (ref 1–33)
AMPHET+METHAMPHET UR QL: NEGATIVE
AMPHETAMINES UR QL: NEGATIVE
ANION GAP SERPL CALCULATED.3IONS-SCNC: 16 MMOL/L (ref 5–15)
ARTERIAL PATENCY WRIST A: ABNORMAL
AST SERPL-CCNC: 7 U/L (ref 1–32)
ATMOSPHERIC PRESS: ABNORMAL MM[HG]
B PARAPERT DNA SPEC QL NAA+PROBE: NOT DETECTED
B PERT DNA SPEC QL NAA+PROBE: NOT DETECTED
BACTERIA UR QL AUTO: ABNORMAL /HPF
BARBITURATES UR QL SCN: NEGATIVE
BASE EXCESS BLDA CALC-SCNC: -0.4 MMOL/L (ref 0–2)
BASE EXCESS BLDA CALC-SCNC: 0.4 MMOL/L (ref 0–2)
BASE EXCESS BLDA CALC-SCNC: 2.1 MMOL/L (ref 0–2)
BASOPHILS # BLD AUTO: 0.03 10*3/MM3 (ref 0–0.2)
BASOPHILS NFR BLD AUTO: 0.3 % (ref 0–1.5)
BDY SITE: ABNORMAL
BDY SITE: ABNORMAL
BENZODIAZ UR QL SCN: NEGATIVE
BILIRUB SERPL-MCNC: 0.2 MG/DL (ref 0–1.2)
BILIRUB UR QL STRIP: NEGATIVE
BODY TEMPERATURE: 37 C
BUN SERPL-MCNC: 52 MG/DL (ref 8–23)
BUN/CREAT SERPL: 11.3 (ref 7–25)
BUPRENORPHINE SERPL-MCNC: NEGATIVE NG/ML
C PNEUM DNA NPH QL NAA+NON-PROBE: NOT DETECTED
CALCIUM SPEC-SCNC: 7.2 MG/DL (ref 8.6–10.5)
CANNABINOIDS SERPL QL: NEGATIVE
CHLORIDE SERPL-SCNC: 108 MMOL/L (ref 98–107)
CK SERPL-CCNC: 22 U/L (ref 20–180)
CLARITY UR: ABNORMAL
CO2 BLDA-SCNC: 28.2 MMOL/L (ref 22–33)
CO2 BLDA-SCNC: 32.2 MMOL/L (ref 22–33)
CO2 BLDA-SCNC: 32.7 MMOL/L (ref 22–33)
CO2 SERPL-SCNC: 27 MMOL/L (ref 22–29)
COCAINE UR QL: NEGATIVE
COHGB MFR BLD: 0.8 % (ref 0–2)
COHGB MFR BLD: 1.1 % (ref 0–2)
COHGB MFR BLD: 1.1 % (ref 0–2)
COLOR UR: YELLOW
CREAT SERPL-MCNC: 4.61 MG/DL (ref 0.57–1)
D-LACTATE SERPL-SCNC: 0.7 MMOL/L (ref 0.5–2)
DEPRECATED RDW RBC AUTO: 66.8 FL (ref 37–54)
EOSINOPHIL # BLD AUTO: 0.42 10*3/MM3 (ref 0–0.4)
EOSINOPHIL NFR BLD AUTO: 4.8 % (ref 0.3–6.2)
EPAP: 0
ERYTHROCYTE [DISTWIDTH] IN BLOOD BY AUTOMATED COUNT: 16.6 % (ref 12.3–15.4)
FLUAV H1 2009 PAND RNA NPH QL NAA+PROBE: NOT DETECTED
FLUAV H1 HA GENE NPH QL NAA+PROBE: NOT DETECTED
FLUAV H3 RNA NPH QL NAA+PROBE: NOT DETECTED
FLUAV SUBTYP SPEC NAA+PROBE: NOT DETECTED
FLUBV RNA ISLT QL NAA+PROBE: NOT DETECTED
GFR SERPL CREATININE-BSD FRML MDRD: 11 ML/MIN/1.73
GFR SERPL CREATININE-BSD FRML MDRD: ABNORMAL ML/MIN/{1.73_M2}
GLOBULIN UR ELPH-MCNC: 3.8 GM/DL
GLUCOSE BLDC GLUCOMTR-MCNC: 134 MG/DL (ref 70–130)
GLUCOSE BLDC GLUCOMTR-MCNC: 68 MG/DL (ref 70–130)
GLUCOSE BLDC GLUCOMTR-MCNC: 73 MG/DL (ref 70–130)
GLUCOSE SERPL-MCNC: 100 MG/DL (ref 65–99)
GLUCOSE UR STRIP-MCNC: NEGATIVE MG/DL
HADV DNA SPEC NAA+PROBE: NOT DETECTED
HCO3 BLDA-SCNC: 26.9 MMOL/L (ref 20–26)
HCO3 BLDA-SCNC: 29.7 MMOL/L (ref 20–26)
HCO3 BLDA-SCNC: 30.2 MMOL/L (ref 20–26)
HCOV 229E RNA SPEC QL NAA+PROBE: NOT DETECTED
HCOV HKU1 RNA SPEC QL NAA+PROBE: NOT DETECTED
HCOV NL63 RNA SPEC QL NAA+PROBE: NOT DETECTED
HCOV OC43 RNA SPEC QL NAA+PROBE: NOT DETECTED
HCT VFR BLD AUTO: 39.6 % (ref 34–46.6)
HCT VFR BLD CALC: 29.8 %
HCT VFR BLD CALC: 31.2 %
HCT VFR BLD CALC: 33.3 %
HGB BLD-MCNC: 11.2 G/DL (ref 12–15.9)
HGB BLDA-MCNC: 10.2 G/DL (ref 14–18)
HGB BLDA-MCNC: 10.9 G/DL (ref 14–18)
HGB BLDA-MCNC: 9.7 G/DL (ref 14–18)
HGB UR QL STRIP.AUTO: ABNORMAL
HMPV RNA NPH QL NAA+NON-PROBE: NOT DETECTED
HPIV1 RNA SPEC QL NAA+PROBE: NOT DETECTED
HPIV2 RNA SPEC QL NAA+PROBE: NOT DETECTED
HPIV3 RNA NPH QL NAA+PROBE: NOT DETECTED
HPIV4 P GENE NPH QL NAA+PROBE: NOT DETECTED
HYALINE CASTS UR QL AUTO: ABNORMAL /LPF
IMM GRANULOCYTES # BLD AUTO: 0.03 10*3/MM3 (ref 0–0.05)
IMM GRANULOCYTES NFR BLD AUTO: 0.3 % (ref 0–0.5)
INHALED O2 CONCENTRATION: 21 %
INHALED O2 CONCENTRATION: 28 %
INHALED O2 CONCENTRATION: 50 %
INR PPP: 2.24 (ref 0.85–1.16)
IPAP: 0
KETONES UR QL STRIP: ABNORMAL
LEUKOCYTE ESTERASE UR QL STRIP.AUTO: ABNORMAL
LYMPHOCYTES # BLD AUTO: 1.82 10*3/MM3 (ref 0.7–3.1)
LYMPHOCYTES NFR BLD AUTO: 20.7 % (ref 19.6–45.3)
Lab: ABNORMAL
Lab: ABNORMAL
M PNEUMO IGG SER IA-ACNC: NOT DETECTED
MAGNESIUM SERPL-MCNC: 1.8 MG/DL (ref 1.6–2.4)
MCH RBC QN AUTO: 30.8 PG (ref 26.6–33)
MCHC RBC AUTO-ENTMCNC: 28.3 G/DL (ref 31.5–35.7)
MCV RBC AUTO: 108.8 FL (ref 79–97)
METHADONE UR QL SCN: NEGATIVE
METHGB BLD QL: 0.4 % (ref 0–1.5)
METHGB BLD QL: 0.6 % (ref 0–1.5)
METHGB BLD QL: 0.6 % (ref 0–1.5)
MODALITY: ABNORMAL
MONOCYTES # BLD AUTO: 0.87 10*3/MM3 (ref 0.1–0.9)
MONOCYTES NFR BLD AUTO: 9.9 % (ref 5–12)
NEUTROPHILS NFR BLD AUTO: 5.64 10*3/MM3 (ref 1.7–7)
NEUTROPHILS NFR BLD AUTO: 64 % (ref 42.7–76)
NITRITE UR QL STRIP: NEGATIVE
NOTE: ABNORMAL
NOTIFIED BY: ABNORMAL
NOTIFIED BY: ABNORMAL
NOTIFIED WHO: ABNORMAL
NOTIFIED WHO: ABNORMAL
NRBC BLD AUTO-RTO: 0 /100 WBC (ref 0–0.2)
NT-PROBNP SERPL-MCNC: 8808 PG/ML (ref 0–900)
OPIATES UR QL: POSITIVE
OXYCODONE UR QL SCN: NEGATIVE
OXYHGB MFR BLDV: 93.3 % (ref 94–99)
OXYHGB MFR BLDV: 94.9 % (ref 94–99)
OXYHGB MFR BLDV: 97.7 % (ref 94–99)
PAW @ PEAK INSP FLOW SETTING VENT: 0 CMH2O
PCO2 BLDA: 42.2 MM HG (ref 35–45)
PCO2 BLDA: 81.3 MM HG (ref 35–45)
PCO2 BLDA: 81.7 MM HG (ref 35–45)
PCO2 TEMP ADJ BLD: 42.2 MM HG (ref 35–45)
PCO2 TEMP ADJ BLD: 81.3 MM HG (ref 35–45)
PCO2 TEMP ADJ BLD: 81.7 MM HG (ref 35–45)
PCP UR QL SCN: NEGATIVE
PH BLDA: 7.17 PH UNITS (ref 7.35–7.45)
PH BLDA: 7.18 PH UNITS (ref 7.35–7.45)
PH BLDA: 7.41 PH UNITS (ref 7.35–7.45)
PH UR STRIP.AUTO: <=5 [PH] (ref 5–8)
PH, TEMP CORRECTED: 7.17 PH UNITS
PH, TEMP CORRECTED: 7.18 PH UNITS
PH, TEMP CORRECTED: 7.41 PH UNITS
PLATELET # BLD AUTO: 266 10*3/MM3 (ref 140–450)
PMV BLD AUTO: 11.3 FL (ref 6–12)
PO2 BLDA: 119 MM HG (ref 83–108)
PO2 BLDA: 85.3 MM HG (ref 83–108)
PO2 BLDA: 98.1 MM HG (ref 83–108)
PO2 TEMP ADJ BLD: 119 MM HG (ref 83–108)
PO2 TEMP ADJ BLD: 85.3 MM HG (ref 83–108)
PO2 TEMP ADJ BLD: 98.1 MM HG (ref 83–108)
POTASSIUM SERPL-SCNC: 4.4 MMOL/L (ref 3.5–5.2)
PROCALCITONIN SERPL-MCNC: 0.22 NG/ML (ref 0–0.25)
PROPOXYPH UR QL: NEGATIVE
PROT SERPL-MCNC: 6.6 G/DL (ref 6–8.5)
PROT UR QL STRIP: ABNORMAL
PROTHROMBIN TIME: 24.5 SECONDS (ref 11.5–14)
RBC # BLD AUTO: 3.64 10*6/MM3 (ref 3.77–5.28)
RBC # UR: ABNORMAL /HPF
REF LAB TEST METHOD: ABNORMAL
RHINOVIRUS RNA SPEC NAA+PROBE: NOT DETECTED
RSV RNA NPH QL NAA+NON-PROBE: NOT DETECTED
SARS-COV-2 RNA NPH QL NAA+NON-PROBE: NOT DETECTED
SODIUM SERPL-SCNC: 151 MMOL/L (ref 136–145)
SP GR UR STRIP: 1.02 (ref 1–1.03)
SQUAMOUS #/AREA URNS HPF: ABNORMAL /HPF
TOTAL RATE: 0 BREATHS/MINUTE
TRICYCLICS UR QL SCN: NEGATIVE
TROPONIN T SERPL-MCNC: 0.08 NG/ML (ref 0–0.03)
TROPONIN T SERPL-MCNC: 0.08 NG/ML (ref 0–0.03)
TROPONIN T SERPL-MCNC: 0.09 NG/ML (ref 0–0.03)
UNIDENT CRYS URNS QL MICRO: ABNORMAL /HPF
UROBILINOGEN UR QL STRIP: ABNORMAL
VENTILATOR MODE: ABNORMAL
WBC # BLD AUTO: 8.81 10*3/MM3 (ref 3.4–10.8)
WBC CLUMPS # UR AUTO: ABNORMAL /HPF
WBC UR QL AUTO: ABNORMAL /HPF

## 2020-12-26 PROCEDURE — 87040 BLOOD CULTURE FOR BACTERIA: CPT | Performed by: PHYSICIAN ASSISTANT

## 2020-12-26 PROCEDURE — 25010000002 MIDAZOLAM PER 1 MG: Performed by: EMERGENCY MEDICINE

## 2020-12-26 PROCEDURE — 70450 CT HEAD/BRAIN W/O DYE: CPT

## 2020-12-26 PROCEDURE — 36600 WITHDRAWAL OF ARTERIAL BLOOD: CPT

## 2020-12-26 PROCEDURE — P9047 ALBUMIN (HUMAN), 25%, 50ML: HCPCS | Performed by: INTERNAL MEDICINE

## 2020-12-26 PROCEDURE — 25010000002 NALOXONE PER 1 MG: Performed by: PHYSICIAN ASSISTANT

## 2020-12-26 PROCEDURE — 25010000002 CEFTRIAXONE PER 250 MG: Performed by: PHYSICIAN ASSISTANT

## 2020-12-26 PROCEDURE — 87086 URINE CULTURE/COLONY COUNT: CPT | Performed by: PHYSICIAN ASSISTANT

## 2020-12-26 PROCEDURE — 94640 AIRWAY INHALATION TREATMENT: CPT

## 2020-12-26 PROCEDURE — 94660 CPAP INITIATION&MGMT: CPT

## 2020-12-26 PROCEDURE — 85610 PROTHROMBIN TIME: CPT | Performed by: PHYSICIAN ASSISTANT

## 2020-12-26 PROCEDURE — 25010000002 PIPERACILLIN SOD-TAZOBACTAM PER 1 G: Performed by: INTERNAL MEDICINE

## 2020-12-26 PROCEDURE — 82962 GLUCOSE BLOOD TEST: CPT

## 2020-12-26 PROCEDURE — 83050 HGB METHEMOGLOBIN QUAN: CPT

## 2020-12-26 PROCEDURE — 94799 UNLISTED PULMONARY SVC/PX: CPT

## 2020-12-26 PROCEDURE — 82805 BLOOD GASES W/O2 SATURATION: CPT

## 2020-12-26 PROCEDURE — 84484 ASSAY OF TROPONIN QUANT: CPT | Performed by: NURSE PRACTITIONER

## 2020-12-26 PROCEDURE — 81001 URINALYSIS AUTO W/SCOPE: CPT | Performed by: PHYSICIAN ASSISTANT

## 2020-12-26 PROCEDURE — 82375 ASSAY CARBOXYHB QUANT: CPT

## 2020-12-26 PROCEDURE — 83735 ASSAY OF MAGNESIUM: CPT | Performed by: PHYSICIAN ASSISTANT

## 2020-12-26 PROCEDURE — 84484 ASSAY OF TROPONIN QUANT: CPT | Performed by: PHYSICIAN ASSISTANT

## 2020-12-26 PROCEDURE — 94002 VENT MGMT INPAT INIT DAY: CPT

## 2020-12-26 PROCEDURE — 82550 ASSAY OF CK (CPK): CPT | Performed by: PHYSICIAN ASSISTANT

## 2020-12-26 PROCEDURE — 85025 COMPLETE CBC W/AUTO DIFF WBC: CPT | Performed by: PHYSICIAN ASSISTANT

## 2020-12-26 PROCEDURE — 0BH18EZ INSERTION OF ENDOTRACHEAL AIRWAY INTO TRACHEA, VIA NATURAL OR ARTIFICIAL OPENING ENDOSCOPIC: ICD-10-PCS | Performed by: EMERGENCY MEDICINE

## 2020-12-26 PROCEDURE — 94770: CPT

## 2020-12-26 PROCEDURE — 99291 CRITICAL CARE FIRST HOUR: CPT | Performed by: INTERNAL MEDICINE

## 2020-12-26 PROCEDURE — 25010000002 LORAZEPAM PER 2 MG: Performed by: INTERNAL MEDICINE

## 2020-12-26 PROCEDURE — 25010000002 MIDAZOLAM 50 MG/10ML SOLUTION: Performed by: EMERGENCY MEDICINE

## 2020-12-26 PROCEDURE — 36415 COLL VENOUS BLD VENIPUNCTURE: CPT

## 2020-12-26 PROCEDURE — 31500 INSERT EMERGENCY AIRWAY: CPT

## 2020-12-26 PROCEDURE — 25010000002 ALBUMIN HUMAN 25% PER 50 ML: Performed by: INTERNAL MEDICINE

## 2020-12-26 PROCEDURE — 80053 COMPREHEN METABOLIC PANEL: CPT | Performed by: PHYSICIAN ASSISTANT

## 2020-12-26 PROCEDURE — 99291 CRITICAL CARE FIRST HOUR: CPT

## 2020-12-26 PROCEDURE — 84145 PROCALCITONIN (PCT): CPT | Performed by: PHYSICIAN ASSISTANT

## 2020-12-26 PROCEDURE — 83880 ASSAY OF NATRIURETIC PEPTIDE: CPT | Performed by: PHYSICIAN ASSISTANT

## 2020-12-26 PROCEDURE — P9612 CATHETERIZE FOR URINE SPEC: HCPCS

## 2020-12-26 PROCEDURE — 25010000002 FENTANYL CITRATE (PF) 2500 MCG/50ML SOLUTION: Performed by: INTERNAL MEDICINE

## 2020-12-26 PROCEDURE — 71045 X-RAY EXAM CHEST 1 VIEW: CPT

## 2020-12-26 PROCEDURE — 0202U NFCT DS 22 TRGT SARS-COV-2: CPT | Performed by: PHYSICIAN ASSISTANT

## 2020-12-26 PROCEDURE — 5A1955Z RESPIRATORY VENTILATION, GREATER THAN 96 CONSECUTIVE HOURS: ICD-10-PCS | Performed by: EMERGENCY MEDICINE

## 2020-12-26 PROCEDURE — 83605 ASSAY OF LACTIC ACID: CPT | Performed by: PHYSICIAN ASSISTANT

## 2020-12-26 PROCEDURE — 80306 DRUG TEST PRSMV INSTRMNT: CPT | Performed by: EMERGENCY MEDICINE

## 2020-12-26 PROCEDURE — 74018 RADEX ABDOMEN 1 VIEW: CPT

## 2020-12-26 RX ORDER — CALCITRIOL 0.25 UG/1
0.25 CAPSULE, LIQUID FILLED ORAL DAILY
Status: DISCONTINUED | OUTPATIENT
Start: 2020-12-26 | End: 2020-12-31

## 2020-12-26 RX ORDER — CHLORHEXIDINE GLUCONATE 0.12 MG/ML
15 RINSE ORAL EVERY 12 HOURS SCHEDULED
Status: DISCONTINUED | OUTPATIENT
Start: 2020-12-26 | End: 2021-01-03

## 2020-12-26 RX ORDER — PANTOPRAZOLE SODIUM 40 MG/10ML
40 INJECTION, POWDER, LYOPHILIZED, FOR SOLUTION INTRAVENOUS
Status: DISCONTINUED | OUTPATIENT
Start: 2020-12-27 | End: 2021-01-14

## 2020-12-26 RX ORDER — ROCURONIUM BROMIDE 10 MG/ML
INJECTION, SOLUTION INTRAVENOUS
Status: COMPLETED | OUTPATIENT
Start: 2020-12-26 | End: 2020-12-26

## 2020-12-26 RX ORDER — MIDAZOLAM HYDROCHLORIDE 1 MG/ML
5 INJECTION INTRAMUSCULAR; INTRAVENOUS ONCE
Status: DISCONTINUED | OUTPATIENT
Start: 2020-12-26 | End: 2020-12-26 | Stop reason: SDUPTHER

## 2020-12-26 RX ORDER — ALBUMIN (HUMAN) 12.5 G/50ML
25 SOLUTION INTRAVENOUS AS NEEDED
Status: DISPENSED | OUTPATIENT
Start: 2020-12-26 | End: 2020-12-27

## 2020-12-26 RX ORDER — ALBUMIN (HUMAN) 12.5 G/50ML
12.5 SOLUTION INTRAVENOUS AS NEEDED
Status: DISCONTINUED | OUTPATIENT
Start: 2020-12-26 | End: 2020-12-26

## 2020-12-26 RX ORDER — NALOXONE HCL 0.4 MG/ML
0.4 VIAL (ML) INJECTION ONCE
Status: COMPLETED | OUTPATIENT
Start: 2020-12-26 | End: 2020-12-26

## 2020-12-26 RX ORDER — SODIUM CHLORIDE 0.9 % (FLUSH) 0.9 %
10 SYRINGE (ML) INJECTION EVERY 12 HOURS SCHEDULED
Status: DISCONTINUED | OUTPATIENT
Start: 2020-12-26 | End: 2021-02-17 | Stop reason: HOSPADM

## 2020-12-26 RX ORDER — LORAZEPAM 2 MG/ML
2 INJECTION INTRAMUSCULAR EVERY 4 HOURS PRN
Status: DISCONTINUED | OUTPATIENT
Start: 2020-12-26 | End: 2021-01-05

## 2020-12-26 RX ORDER — SODIUM CHLORIDE 0.9 % (FLUSH) 0.9 %
10 SYRINGE (ML) INJECTION AS NEEDED
Status: DISCONTINUED | OUTPATIENT
Start: 2020-12-26 | End: 2020-12-29

## 2020-12-26 RX ORDER — ONDANSETRON 2 MG/ML
4 INJECTION INTRAMUSCULAR; INTRAVENOUS EVERY 6 HOURS PRN
Status: DISCONTINUED | OUTPATIENT
Start: 2020-12-26 | End: 2021-02-17 | Stop reason: HOSPADM

## 2020-12-26 RX ORDER — ACETAMINOPHEN 325 MG/1
650 TABLET ORAL EVERY 6 HOURS PRN
Status: DISCONTINUED | OUTPATIENT
Start: 2020-12-26 | End: 2021-01-11 | Stop reason: ALTCHOICE

## 2020-12-26 RX ORDER — NOREPINEPHRINE BIT/0.9 % NACL 8 MG/250ML
.02-.3 INFUSION BOTTLE (ML) INTRAVENOUS
Status: DISCONTINUED | OUTPATIENT
Start: 2020-12-26 | End: 2020-12-29

## 2020-12-26 RX ORDER — DEXTROSE MONOHYDRATE 25 G/50ML
INJECTION, SOLUTION INTRAVENOUS
Status: COMPLETED
Start: 2020-12-26 | End: 2020-12-26

## 2020-12-26 RX ORDER — IPRATROPIUM BROMIDE AND ALBUTEROL SULFATE 2.5; .5 MG/3ML; MG/3ML
3 SOLUTION RESPIRATORY (INHALATION)
Status: DISCONTINUED | OUTPATIENT
Start: 2020-12-26 | End: 2020-12-29

## 2020-12-26 RX ORDER — KETAMINE HCL IN NACL, ISO-OSM 100MG/10ML
SYRINGE (ML) INJECTION
Status: COMPLETED | OUTPATIENT
Start: 2020-12-26 | End: 2020-12-26

## 2020-12-26 RX ORDER — MIDAZOLAM HYDROCHLORIDE 1 MG/ML
5 INJECTION INTRAMUSCULAR; INTRAVENOUS ONCE
Status: COMPLETED | OUTPATIENT
Start: 2020-12-26 | End: 2020-12-26

## 2020-12-26 RX ORDER — ATORVASTATIN CALCIUM 40 MG/1
80 TABLET, FILM COATED ORAL NIGHTLY
Status: DISCONTINUED | OUTPATIENT
Start: 2020-12-26 | End: 2021-01-11

## 2020-12-26 RX ORDER — SODIUM CHLORIDE 0.9 % (FLUSH) 0.9 %
10 SYRINGE (ML) INJECTION AS NEEDED
Status: DISCONTINUED | OUTPATIENT
Start: 2020-12-26 | End: 2021-02-17 | Stop reason: HOSPADM

## 2020-12-26 RX ORDER — DEXMEDETOMIDINE HYDROCHLORIDE 4 UG/ML
.2-1.5 INJECTION, SOLUTION INTRAVENOUS
Status: DISCONTINUED | OUTPATIENT
Start: 2020-12-26 | End: 2020-12-27

## 2020-12-26 RX ORDER — ASPIRIN 81 MG/1
81 TABLET, CHEWABLE ORAL DAILY
Status: DISCONTINUED | OUTPATIENT
Start: 2020-12-26 | End: 2021-01-11

## 2020-12-26 RX ORDER — MIDAZOLAM HCL IN 0.9 % NACL/PF 1 MG/ML
1 PLASTIC BAG, INJECTION (ML) INTRAVENOUS
Status: DISCONTINUED | OUTPATIENT
Start: 2020-12-26 | End: 2020-12-26

## 2020-12-26 RX ADMIN — FENTANYL CITRATE 50 MCG/HR: 0.05 INJECTION, SOLUTION INTRAMUSCULAR; INTRAVENOUS at 17:57

## 2020-12-26 RX ADMIN — DEXTROSE MONOHYDRATE 50 ML: 25 INJECTION, SOLUTION INTRAVENOUS at 17:58

## 2020-12-26 RX ADMIN — Medication 300 MG: at 12:10

## 2020-12-26 RX ADMIN — CEFTRIAXONE SODIUM 1 G: 1 INJECTION, POWDER, FOR SOLUTION INTRAMUSCULAR; INTRAVENOUS at 10:45

## 2020-12-26 RX ADMIN — ROCURONIUM BROMIDE 50 MG: 10 INJECTION INTRAVENOUS at 12:11

## 2020-12-26 RX ADMIN — DEXMEDETOMIDINE HYDROCHLORIDE 0.2 MCG/KG/HR: 4 INJECTION, SOLUTION INTRAVENOUS at 17:58

## 2020-12-26 RX ADMIN — NALXONE HYDROCHLORIDE 0.4 MG: 0.4 INJECTION INTRAMUSCULAR; INTRAVENOUS; SUBCUTANEOUS at 08:25

## 2020-12-26 RX ADMIN — LORAZEPAM 2 MG: 2 INJECTION INTRAMUSCULAR; INTRAVENOUS at 21:35

## 2020-12-26 RX ADMIN — TAZOBACTAM SODIUM AND PIPERACILLIN SODIUM 3.38 G: 375; 3 INJECTION, SOLUTION INTRAVENOUS at 17:58

## 2020-12-26 RX ADMIN — CHLORHEXIDINE GLUCONATE 0.12% ORAL RINSE 15 ML: 1.2 LIQUID ORAL at 21:42

## 2020-12-26 RX ADMIN — MIDAZOLAM 1 MG/HR: 5 INJECTION INTRAMUSCULAR; INTRAVENOUS at 12:50

## 2020-12-26 RX ADMIN — MIDAZOLAM 5 MG: 1 INJECTION INTRAMUSCULAR; INTRAVENOUS at 13:07

## 2020-12-26 RX ADMIN — IPRATROPIUM BROMIDE AND ALBUTEROL SULFATE 3 ML: 2.5; .5 SOLUTION RESPIRATORY (INHALATION) at 20:17

## 2020-12-26 RX ADMIN — DEXMEDETOMIDINE HYDROCHLORIDE 0.8 MCG/KG/HR: 4 INJECTION, SOLUTION INTRAVENOUS at 22:21

## 2020-12-26 RX ADMIN — SODIUM CHLORIDE 5 MCG/KG/MIN: 9 INJECTION, SOLUTION INTRAVENOUS at 14:29

## 2020-12-26 RX ADMIN — SODIUM CHLORIDE, PRESERVATIVE FREE 10 ML: 5 INJECTION INTRAVENOUS at 21:00

## 2020-12-26 RX ADMIN — ALBUMIN HUMAN 50 G: 0.25 SOLUTION INTRAVENOUS at 17:02

## 2020-12-27 ENCOUNTER — APPOINTMENT (OUTPATIENT)
Dept: GENERAL RADIOLOGY | Facility: HOSPITAL | Age: 70
End: 2020-12-27

## 2020-12-27 LAB
ALBUMIN SERPL-MCNC: 3.5 G/DL (ref 3.5–5.2)
ALBUMIN/GLOB SERPL: 0.9 G/DL
ALP SERPL-CCNC: 72 U/L (ref 39–117)
ALT SERPL W P-5'-P-CCNC: 6 U/L (ref 1–33)
ANION GAP SERPL CALCULATED.3IONS-SCNC: 18 MMOL/L (ref 5–15)
ARTERIAL PATENCY WRIST A: ABNORMAL
AST SERPL-CCNC: 10 U/L (ref 1–32)
ATMOSPHERIC PRESS: ABNORMAL MM[HG]
BACTERIA SPEC AEROBE CULT: NORMAL
BASE EXCESS BLDA CALC-SCNC: 3.9 MMOL/L (ref 0–2)
BASOPHILS # BLD AUTO: 0.03 10*3/MM3 (ref 0–0.2)
BASOPHILS NFR BLD AUTO: 0.4 % (ref 0–1.5)
BDY SITE: ABNORMAL
BILIRUB SERPL-MCNC: 0.9 MG/DL (ref 0–1.2)
BODY TEMPERATURE: 37 C
BUN SERPL-MCNC: 47 MG/DL (ref 8–23)
BUN/CREAT SERPL: 11.3 (ref 7–25)
CALCIUM SPEC-SCNC: 9.3 MG/DL (ref 8.6–10.5)
CHLORIDE SERPL-SCNC: 103 MMOL/L (ref 98–107)
CO2 BLDA-SCNC: 27.8 MMOL/L (ref 22–33)
CO2 SERPL-SCNC: 23 MMOL/L (ref 22–29)
COHGB MFR BLD: 0.8 % (ref 0–2)
CREAT SERPL-MCNC: 4.15 MG/DL (ref 0.57–1)
DEPRECATED RDW RBC AUTO: 60.2 FL (ref 37–54)
EOSINOPHIL # BLD AUTO: 0.32 10*3/MM3 (ref 0–0.4)
EOSINOPHIL NFR BLD AUTO: 3.8 % (ref 0.3–6.2)
EPAP: 0
ERYTHROCYTE [DISTWIDTH] IN BLOOD BY AUTOMATED COUNT: 16.4 % (ref 12.3–15.4)
GFR SERPL CREATININE-BSD FRML MDRD: 13 ML/MIN/1.73
GFR SERPL CREATININE-BSD FRML MDRD: ABNORMAL ML/MIN/{1.73_M2}
GLOBULIN UR ELPH-MCNC: 3.7 GM/DL
GLUCOSE BLDC GLUCOMTR-MCNC: 131 MG/DL (ref 70–130)
GLUCOSE BLDC GLUCOMTR-MCNC: 144 MG/DL (ref 70–130)
GLUCOSE BLDC GLUCOMTR-MCNC: 162 MG/DL (ref 70–130)
GLUCOSE BLDC GLUCOMTR-MCNC: 166 MG/DL (ref 70–130)
GLUCOSE SERPL-MCNC: 148 MG/DL (ref 65–99)
HCO3 BLDA-SCNC: 26.8 MMOL/L (ref 20–26)
HCT VFR BLD AUTO: 34 % (ref 34–46.6)
HCT VFR BLD CALC: 31 %
HGB BLD-MCNC: 9.8 G/DL (ref 12–15.9)
HGB BLDA-MCNC: 10.1 G/DL (ref 14–18)
IMM GRANULOCYTES # BLD AUTO: 0.02 10*3/MM3 (ref 0–0.05)
IMM GRANULOCYTES NFR BLD AUTO: 0.2 % (ref 0–0.5)
INHALED O2 CONCENTRATION: 50 %
INR PPP: 2.59 (ref 0.85–1.16)
IPAP: 0
LYMPHOCYTES # BLD AUTO: 1.05 10*3/MM3 (ref 0.7–3.1)
LYMPHOCYTES NFR BLD AUTO: 12.3 % (ref 19.6–45.3)
MAGNESIUM SERPL-MCNC: 2.2 MG/DL (ref 1.6–2.4)
MCH RBC QN AUTO: 28.7 PG (ref 26.6–33)
MCHC RBC AUTO-ENTMCNC: 28.8 G/DL (ref 31.5–35.7)
MCV RBC AUTO: 99.7 FL (ref 79–97)
METHGB BLD QL: 0.7 % (ref 0–1.5)
MODALITY: ABNORMAL
MONOCYTES # BLD AUTO: 0.55 10*3/MM3 (ref 0.1–0.9)
MONOCYTES NFR BLD AUTO: 6.4 % (ref 5–12)
NEUTROPHILS NFR BLD AUTO: 6.56 10*3/MM3 (ref 1.7–7)
NEUTROPHILS NFR BLD AUTO: 76.9 % (ref 42.7–76)
NOTE: ABNORMAL
NRBC BLD AUTO-RTO: 0 /100 WBC (ref 0–0.2)
NT-PROBNP SERPL-MCNC: 9781 PG/ML (ref 0–900)
OXYHGB MFR BLDV: 94.9 % (ref 94–99)
PAW @ PEAK INSP FLOW SETTING VENT: 0 CMH2O
PCO2 BLDA: 33.1 MM HG (ref 35–45)
PCO2 TEMP ADJ BLD: 33.1 MM HG (ref 35–45)
PH BLDA: 7.52 PH UNITS (ref 7.35–7.45)
PH, TEMP CORRECTED: 7.52 PH UNITS
PHOSPHATE SERPL-MCNC: 3.7 MG/DL (ref 2.5–4.5)
PLATELET # BLD AUTO: 191 10*3/MM3 (ref 140–450)
PMV BLD AUTO: 10.2 FL (ref 6–12)
PO2 BLDA: 73.5 MM HG (ref 83–108)
PO2 TEMP ADJ BLD: 73.5 MM HG (ref 83–108)
POTASSIUM SERPL-SCNC: 4.4 MMOL/L (ref 3.5–5.2)
PROCALCITONIN SERPL-MCNC: 0.29 NG/ML (ref 0–0.25)
PROT SERPL-MCNC: 7.2 G/DL (ref 6–8.5)
PROTHROMBIN TIME: 27.4 SECONDS (ref 11.5–14)
RBC # BLD AUTO: 3.41 10*6/MM3 (ref 3.77–5.28)
SODIUM SERPL-SCNC: 144 MMOL/L (ref 136–145)
TOTAL RATE: 0 BREATHS/MINUTE
WBC # BLD AUTO: 8.53 10*3/MM3 (ref 3.4–10.8)

## 2020-12-27 PROCEDURE — 99233 SBSQ HOSP IP/OBS HIGH 50: CPT | Performed by: INTERNAL MEDICINE

## 2020-12-27 PROCEDURE — 85025 COMPLETE CBC W/AUTO DIFF WBC: CPT | Performed by: INTERNAL MEDICINE

## 2020-12-27 PROCEDURE — 94770: CPT

## 2020-12-27 PROCEDURE — 83735 ASSAY OF MAGNESIUM: CPT | Performed by: INTERNAL MEDICINE

## 2020-12-27 PROCEDURE — 36600 WITHDRAWAL OF ARTERIAL BLOOD: CPT

## 2020-12-27 PROCEDURE — 94799 UNLISTED PULMONARY SVC/PX: CPT

## 2020-12-27 PROCEDURE — 84100 ASSAY OF PHOSPHORUS: CPT | Performed by: INTERNAL MEDICINE

## 2020-12-27 PROCEDURE — 25010000002 FENTANYL CITRATE (PF) 2500 MCG/50ML SOLUTION: Performed by: INTERNAL MEDICINE

## 2020-12-27 PROCEDURE — 80053 COMPREHEN METABOLIC PANEL: CPT | Performed by: INTERNAL MEDICINE

## 2020-12-27 PROCEDURE — 85610 PROTHROMBIN TIME: CPT | Performed by: INTERNAL MEDICINE

## 2020-12-27 PROCEDURE — 82805 BLOOD GASES W/O2 SATURATION: CPT

## 2020-12-27 PROCEDURE — 83880 ASSAY OF NATRIURETIC PEPTIDE: CPT | Performed by: INTERNAL MEDICINE

## 2020-12-27 PROCEDURE — 94003 VENT MGMT INPAT SUBQ DAY: CPT

## 2020-12-27 PROCEDURE — 83050 HGB METHEMOGLOBIN QUAN: CPT

## 2020-12-27 PROCEDURE — 84145 PROCALCITONIN (PCT): CPT | Performed by: INTERNAL MEDICINE

## 2020-12-27 PROCEDURE — 63710000001 INSULIN REGULAR HUMAN PER 5 UNITS: Performed by: INTERNAL MEDICINE

## 2020-12-27 PROCEDURE — 82962 GLUCOSE BLOOD TEST: CPT

## 2020-12-27 PROCEDURE — 71045 X-RAY EXAM CHEST 1 VIEW: CPT

## 2020-12-27 PROCEDURE — 25010000002 LORAZEPAM PER 2 MG: Performed by: INTERNAL MEDICINE

## 2020-12-27 PROCEDURE — 25010000002 PIPERACILLIN SOD-TAZOBACTAM PER 1 G: Performed by: INTERNAL MEDICINE

## 2020-12-27 PROCEDURE — 25010000002 HYDRALAZINE PER 20 MG: Performed by: NURSE PRACTITIONER

## 2020-12-27 PROCEDURE — 82375 ASSAY CARBOXYHB QUANT: CPT

## 2020-12-27 RX ORDER — HYDRALAZINE HYDROCHLORIDE 20 MG/ML
20 INJECTION INTRAMUSCULAR; INTRAVENOUS EVERY 6 HOURS PRN
Status: DISCONTINUED | OUTPATIENT
Start: 2020-12-27 | End: 2021-01-05

## 2020-12-27 RX ADMIN — SODIUM CHLORIDE, PRESERVATIVE FREE 10 ML: 5 INJECTION INTRAVENOUS at 20:49

## 2020-12-27 RX ADMIN — DEXMEDETOMIDINE HYDROCHLORIDE 0.6 MCG/KG/HR: 4 INJECTION, SOLUTION INTRAVENOUS at 01:58

## 2020-12-27 RX ADMIN — FENTANYL CITRATE 300 MCG/HR: 0.05 INJECTION, SOLUTION INTRAMUSCULAR; INTRAVENOUS at 12:23

## 2020-12-27 RX ADMIN — FENTANYL CITRATE 150 MCG/HR: 0.05 INJECTION, SOLUTION INTRAMUSCULAR; INTRAVENOUS at 05:31

## 2020-12-27 RX ADMIN — DEXMEDETOMIDINE HYDROCHLORIDE 0.6 MCG/KG/HR: 4 INJECTION, SOLUTION INTRAVENOUS at 06:07

## 2020-12-27 RX ADMIN — ATORVASTATIN CALCIUM 80 MG: 40 TABLET, FILM COATED ORAL at 01:00

## 2020-12-27 RX ADMIN — IPRATROPIUM BROMIDE AND ALBUTEROL SULFATE 3 ML: 2.5; .5 SOLUTION RESPIRATORY (INHALATION) at 08:50

## 2020-12-27 RX ADMIN — CALCITRIOL CAPSULES 0.25 MCG 0.25 MCG: 0.25 CAPSULE ORAL at 01:01

## 2020-12-27 RX ADMIN — DEXMEDETOMIDINE HYDROCHLORIDE 1.5 MCG/KG/HR: 200 INJECTION INTRAVENOUS at 15:29

## 2020-12-27 RX ADMIN — HYDRALAZINE HYDROCHLORIDE 20 MG: 20 INJECTION INTRAMUSCULAR; INTRAVENOUS at 01:58

## 2020-12-27 RX ADMIN — TAZOBACTAM SODIUM AND PIPERACILLIN SODIUM 3.38 G: 375; 3 INJECTION, SOLUTION INTRAVENOUS at 00:58

## 2020-12-27 RX ADMIN — IPRATROPIUM BROMIDE AND ALBUTEROL SULFATE 3 ML: 2.5; .5 SOLUTION RESPIRATORY (INHALATION) at 22:40

## 2020-12-27 RX ADMIN — TAZOBACTAM SODIUM AND PIPERACILLIN SODIUM 3.38 G: 375; 3 INJECTION, SOLUTION INTRAVENOUS at 23:35

## 2020-12-27 RX ADMIN — HYDRALAZINE HYDROCHLORIDE 20 MG: 20 INJECTION INTRAMUSCULAR; INTRAVENOUS at 23:34

## 2020-12-27 RX ADMIN — PANTOPRAZOLE SODIUM 40 MG: 40 INJECTION, POWDER, FOR SOLUTION INTRAVENOUS at 05:21

## 2020-12-27 RX ADMIN — DEXMEDETOMIDINE HYDROCHLORIDE 1.5 MCG/KG/HR: 200 INJECTION INTRAVENOUS at 20:48

## 2020-12-27 RX ADMIN — INSULIN HUMAN 3 UNITS: 100 INJECTION, SOLUTION PARENTERAL at 18:15

## 2020-12-27 RX ADMIN — ASPIRIN 81 MG CHEWABLE TABLET 81 MG: 81 TABLET CHEWABLE at 01:01

## 2020-12-27 RX ADMIN — CHLORHEXIDINE GLUCONATE 0.12% ORAL RINSE 15 ML: 1.2 LIQUID ORAL at 09:11

## 2020-12-27 RX ADMIN — FENTANYL CITRATE 300 MCG/HR: 0.05 INJECTION, SOLUTION INTRAMUSCULAR; INTRAVENOUS at 19:09

## 2020-12-27 RX ADMIN — DEXMEDETOMIDINE HYDROCHLORIDE 1.5 MCG/KG/HR: 200 INJECTION INTRAVENOUS at 10:34

## 2020-12-27 RX ADMIN — IPRATROPIUM BROMIDE AND ALBUTEROL SULFATE 3 ML: 2.5; .5 SOLUTION RESPIRATORY (INHALATION) at 17:25

## 2020-12-27 RX ADMIN — ATORVASTATIN CALCIUM 80 MG: 40 TABLET, FILM COATED ORAL at 20:48

## 2020-12-27 RX ADMIN — INSULIN HUMAN 3 UNITS: 100 INJECTION, SOLUTION PARENTERAL at 12:22

## 2020-12-27 RX ADMIN — LORAZEPAM 2 MG: 2 INJECTION INTRAMUSCULAR; INTRAVENOUS at 10:24

## 2020-12-27 RX ADMIN — ASPIRIN 81 MG CHEWABLE TABLET 81 MG: 81 TABLET CHEWABLE at 09:11

## 2020-12-27 RX ADMIN — TAZOBACTAM SODIUM AND PIPERACILLIN SODIUM 3.38 G: 375; 3 INJECTION, SOLUTION INTRAVENOUS at 12:23

## 2020-12-27 RX ADMIN — DEXMEDETOMIDINE HYDROCHLORIDE 1.5 MCG/KG/HR: 4 INJECTION, SOLUTION INTRAVENOUS at 09:12

## 2020-12-28 ENCOUNTER — APPOINTMENT (OUTPATIENT)
Dept: NEPHROLOGY | Facility: HOSPITAL | Age: 70
End: 2020-12-28

## 2020-12-28 ENCOUNTER — APPOINTMENT (OUTPATIENT)
Dept: GENERAL RADIOLOGY | Facility: HOSPITAL | Age: 70
End: 2020-12-28

## 2020-12-28 LAB
ALBUMIN SERPL-MCNC: 3.3 G/DL (ref 3.5–5.2)
ALBUMIN/GLOB SERPL: 0.9 G/DL
ALP SERPL-CCNC: 71 U/L (ref 39–117)
ALT SERPL W P-5'-P-CCNC: 5 U/L (ref 1–33)
ANION GAP SERPL CALCULATED.3IONS-SCNC: 20 MMOL/L (ref 5–15)
ARTERIAL PATENCY WRIST A: ABNORMAL
AST SERPL-CCNC: 9 U/L (ref 1–32)
ATMOSPHERIC PRESS: ABNORMAL MM[HG]
BASE EXCESS BLDA CALC-SCNC: 2.7 MMOL/L (ref 0–2)
BDY SITE: ABNORMAL
BILIRUB SERPL-MCNC: 1.1 MG/DL (ref 0–1.2)
BODY TEMPERATURE: 37 C
BUN SERPL-MCNC: 56 MG/DL (ref 8–23)
BUN/CREAT SERPL: 11.7 (ref 7–25)
CALCIUM SPEC-SCNC: 9 MG/DL (ref 8.6–10.5)
CHLORIDE SERPL-SCNC: 103 MMOL/L (ref 98–107)
CO2 BLDA-SCNC: 25.9 MMOL/L (ref 22–33)
CO2 SERPL-SCNC: 22 MMOL/L (ref 22–29)
COHGB MFR BLD: 1 % (ref 0–2)
CREAT SERPL-MCNC: 4.78 MG/DL (ref 0.57–1)
DEPRECATED RDW RBC AUTO: 63.7 FL (ref 37–54)
EPAP: 0
ERYTHROCYTE [DISTWIDTH] IN BLOOD BY AUTOMATED COUNT: 16.7 % (ref 12.3–15.4)
GFR SERPL CREATININE-BSD FRML MDRD: 11 ML/MIN/1.73
GFR SERPL CREATININE-BSD FRML MDRD: ABNORMAL ML/MIN/{1.73_M2}
GLOBULIN UR ELPH-MCNC: 3.8 GM/DL
GLUCOSE BLDC GLUCOMTR-MCNC: 137 MG/DL (ref 70–130)
GLUCOSE BLDC GLUCOMTR-MCNC: 137 MG/DL (ref 70–130)
GLUCOSE BLDC GLUCOMTR-MCNC: 150 MG/DL (ref 70–130)
GLUCOSE BLDC GLUCOMTR-MCNC: 154 MG/DL (ref 70–130)
GLUCOSE SERPL-MCNC: 145 MG/DL (ref 65–99)
HCO3 BLDA-SCNC: 25 MMOL/L (ref 20–26)
HCT VFR BLD AUTO: 36.8 % (ref 34–46.6)
HCT VFR BLD CALC: 31.6 %
HGB BLD-MCNC: 10.4 G/DL (ref 12–15.9)
HGB BLDA-MCNC: 10.3 G/DL (ref 14–18)
INHALED O2 CONCENTRATION: 40 %
INR PPP: 3.02 (ref 0.85–1.16)
IPAP: 0
MAGNESIUM SERPL-MCNC: 2 MG/DL (ref 1.6–2.4)
MCH RBC QN AUTO: 29.5 PG (ref 26.6–33)
MCHC RBC AUTO-ENTMCNC: 28.3 G/DL (ref 31.5–35.7)
MCV RBC AUTO: 104.2 FL (ref 79–97)
METHGB BLD QL: 0.5 % (ref 0–1.5)
MODALITY: ABNORMAL
NOTE: ABNORMAL
OXYHGB MFR BLDV: 96.6 % (ref 94–99)
PAW @ PEAK INSP FLOW SETTING VENT: 0 CMH2O
PCO2 BLDA: 29.9 MM HG (ref 35–45)
PCO2 TEMP ADJ BLD: 29.9 MM HG (ref 35–45)
PEEP RESPIRATORY: 5 CM[H2O]
PH BLDA: 7.53 PH UNITS (ref 7.35–7.45)
PH, TEMP CORRECTED: 7.53 PH UNITS
PHOSPHATE SERPL-MCNC: 3.8 MG/DL (ref 2.5–4.5)
PLATELET # BLD AUTO: 182 10*3/MM3 (ref 140–450)
PMV BLD AUTO: 9.6 FL (ref 6–12)
PO2 BLDA: 97.7 MM HG (ref 83–108)
PO2 TEMP ADJ BLD: 97.7 MM HG (ref 83–108)
POTASSIUM SERPL-SCNC: 4.4 MMOL/L (ref 3.5–5.2)
PROT SERPL-MCNC: 7.1 G/DL (ref 6–8.5)
PROTHROMBIN TIME: 31 SECONDS (ref 11.5–14)
RBC # BLD AUTO: 3.53 10*6/MM3 (ref 3.77–5.28)
SODIUM SERPL-SCNC: 145 MMOL/L (ref 136–145)
TOTAL RATE: 0 BREATHS/MINUTE
VENTILATOR MODE: ABNORMAL
WBC # BLD AUTO: 7.03 10*3/MM3 (ref 3.4–10.8)

## 2020-12-28 PROCEDURE — 80053 COMPREHEN METABOLIC PANEL: CPT | Performed by: INTERNAL MEDICINE

## 2020-12-28 PROCEDURE — 83050 HGB METHEMOGLOBIN QUAN: CPT

## 2020-12-28 PROCEDURE — 94799 UNLISTED PULMONARY SVC/PX: CPT

## 2020-12-28 PROCEDURE — 25010000002 FENTANYL CITRATE (PF) 2500 MCG/50ML SOLUTION: Performed by: INTERNAL MEDICINE

## 2020-12-28 PROCEDURE — 94003 VENT MGMT INPAT SUBQ DAY: CPT

## 2020-12-28 PROCEDURE — 71045 X-RAY EXAM CHEST 1 VIEW: CPT

## 2020-12-28 PROCEDURE — 63710000001 INSULIN REGULAR HUMAN PER 5 UNITS: Performed by: INTERNAL MEDICINE

## 2020-12-28 PROCEDURE — 85610 PROTHROMBIN TIME: CPT | Performed by: INTERNAL MEDICINE

## 2020-12-28 PROCEDURE — 85027 COMPLETE CBC AUTOMATED: CPT | Performed by: INTERNAL MEDICINE

## 2020-12-28 PROCEDURE — 25010000002 HYDRALAZINE PER 20 MG: Performed by: NURSE PRACTITIONER

## 2020-12-28 PROCEDURE — 36600 WITHDRAWAL OF ARTERIAL BLOOD: CPT

## 2020-12-28 PROCEDURE — 25010000002 PIPERACILLIN SOD-TAZOBACTAM PER 1 G: Performed by: INTERNAL MEDICINE

## 2020-12-28 PROCEDURE — 99233 SBSQ HOSP IP/OBS HIGH 50: CPT | Performed by: INTERNAL MEDICINE

## 2020-12-28 PROCEDURE — 25010000002 LORAZEPAM PER 2 MG: Performed by: INTERNAL MEDICINE

## 2020-12-28 PROCEDURE — 82805 BLOOD GASES W/O2 SATURATION: CPT

## 2020-12-28 PROCEDURE — 83735 ASSAY OF MAGNESIUM: CPT | Performed by: INTERNAL MEDICINE

## 2020-12-28 PROCEDURE — 84100 ASSAY OF PHOSPHORUS: CPT | Performed by: INTERNAL MEDICINE

## 2020-12-28 PROCEDURE — 94770: CPT

## 2020-12-28 PROCEDURE — 82375 ASSAY CARBOXYHB QUANT: CPT

## 2020-12-28 PROCEDURE — 82962 GLUCOSE BLOOD TEST: CPT

## 2020-12-28 RX ORDER — AMINO ACIDS/PROTEIN HYDROLYS 15G-100/30
30 LIQUID (ML) ORAL 3 TIMES DAILY
Status: DISCONTINUED | OUTPATIENT
Start: 2020-12-28 | End: 2021-01-22

## 2020-12-28 RX ORDER — ALBUMIN (HUMAN) 12.5 G/50ML
12.5 SOLUTION INTRAVENOUS AS NEEDED
Status: ACTIVE | OUTPATIENT
Start: 2020-12-28 | End: 2020-12-28

## 2020-12-28 RX ORDER — TERAZOSIN 1 MG/1
1 CAPSULE ORAL NIGHTLY
Status: DISCONTINUED | OUTPATIENT
Start: 2020-12-28 | End: 2021-01-11

## 2020-12-28 RX ORDER — ALBUMIN (HUMAN) 12.5 G/50ML
12.5 SOLUTION INTRAVENOUS AS NEEDED
Status: COMPLETED | OUTPATIENT
Start: 2020-12-28 | End: 2020-12-29

## 2020-12-28 RX ORDER — ALBUMIN (HUMAN) 12.5 G/50ML
12.5 SOLUTION INTRAVENOUS AS NEEDED
Status: ACTIVE | OUTPATIENT
Start: 2020-12-29 | End: 2020-12-29

## 2020-12-28 RX ORDER — CARVEDILOL 12.5 MG/1
12.5 TABLET ORAL 2 TIMES DAILY WITH MEALS
Status: DISCONTINUED | OUTPATIENT
Start: 2020-12-28 | End: 2021-01-03

## 2020-12-28 RX ADMIN — TERAZOSIN HYDROCHLORIDE 1 MG: 1 CAPSULE ORAL at 20:51

## 2020-12-28 RX ADMIN — DEXMEDETOMIDINE HYDROCHLORIDE 1.5 MCG/KG/HR: 200 INJECTION INTRAVENOUS at 01:45

## 2020-12-28 RX ADMIN — FENTANYL CITRATE 300 MCG/HR: 0.05 INJECTION, SOLUTION INTRAMUSCULAR; INTRAVENOUS at 17:17

## 2020-12-28 RX ADMIN — FENTANYL CITRATE 300 MCG/HR: 0.05 INJECTION, SOLUTION INTRAMUSCULAR; INTRAVENOUS at 09:17

## 2020-12-28 RX ADMIN — INSULIN HUMAN 3 UNITS: 100 INJECTION, SOLUTION PARENTERAL at 17:13

## 2020-12-28 RX ADMIN — PANTOPRAZOLE SODIUM 40 MG: 40 INJECTION, POWDER, FOR SOLUTION INTRAVENOUS at 05:40

## 2020-12-28 RX ADMIN — LORAZEPAM 2 MG: 2 INJECTION INTRAMUSCULAR; INTRAVENOUS at 02:48

## 2020-12-28 RX ADMIN — HYDRALAZINE HYDROCHLORIDE 20 MG: 20 INJECTION INTRAMUSCULAR; INTRAVENOUS at 17:31

## 2020-12-28 RX ADMIN — SERTRALINE HYDROCHLORIDE 50 MG: 50 TABLET ORAL at 09:07

## 2020-12-28 RX ADMIN — Medication 30 ML: at 20:51

## 2020-12-28 RX ADMIN — CARVEDILOL 12.5 MG: 12.5 TABLET, FILM COATED ORAL at 09:07

## 2020-12-28 RX ADMIN — CALCITRIOL CAPSULES 0.25 MCG 0.25 MCG: 0.25 CAPSULE ORAL at 09:07

## 2020-12-28 RX ADMIN — DEXMEDETOMIDINE HYDROCHLORIDE 1.5 MCG/KG/HR: 200 INJECTION INTRAVENOUS at 07:32

## 2020-12-28 RX ADMIN — TAZOBACTAM SODIUM AND PIPERACILLIN SODIUM 3.38 G: 375; 3 INJECTION, SOLUTION INTRAVENOUS at 11:25

## 2020-12-28 RX ADMIN — ATORVASTATIN CALCIUM 80 MG: 40 TABLET, FILM COATED ORAL at 20:52

## 2020-12-28 RX ADMIN — Medication 30 ML: at 16:03

## 2020-12-28 RX ADMIN — ASPIRIN 81 MG CHEWABLE TABLET 81 MG: 81 TABLET CHEWABLE at 09:07

## 2020-12-28 RX ADMIN — Medication 30 ML: at 13:40

## 2020-12-28 RX ADMIN — DEXMEDETOMIDINE HYDROCHLORIDE 1.5 MCG/KG/HR: 200 INJECTION INTRAVENOUS at 20:51

## 2020-12-28 RX ADMIN — SODIUM CHLORIDE, PRESERVATIVE FREE 10 ML: 5 INJECTION INTRAVENOUS at 20:51

## 2020-12-28 RX ADMIN — IPRATROPIUM BROMIDE AND ALBUTEROL SULFATE 3 ML: 2.5; .5 SOLUTION RESPIRATORY (INHALATION) at 19:21

## 2020-12-28 RX ADMIN — CHLORHEXIDINE GLUCONATE 0.12% ORAL RINSE 15 ML: 1.2 LIQUID ORAL at 09:07

## 2020-12-28 RX ADMIN — DEXMEDETOMIDINE HYDROCHLORIDE 1.4 MCG/KG/HR: 200 INJECTION INTRAVENOUS at 11:25

## 2020-12-28 RX ADMIN — SODIUM CHLORIDE, PRESERVATIVE FREE 10 ML: 5 INJECTION INTRAVENOUS at 09:07

## 2020-12-28 RX ADMIN — CHLORHEXIDINE GLUCONATE 0.12% ORAL RINSE 15 ML: 1.2 LIQUID ORAL at 20:52

## 2020-12-28 RX ADMIN — DEXMEDETOMIDINE HYDROCHLORIDE 1.5 MCG/KG/HR: 200 INJECTION INTRAVENOUS at 15:32

## 2020-12-28 RX ADMIN — CARVEDILOL 12.5 MG: 12.5 TABLET, FILM COATED ORAL at 17:07

## 2020-12-28 RX ADMIN — FENTANYL CITRATE 300 MCG/HR: 0.05 INJECTION, SOLUTION INTRAMUSCULAR; INTRAVENOUS at 01:45

## 2020-12-28 RX ADMIN — IPRATROPIUM BROMIDE AND ALBUTEROL SULFATE 3 ML: 2.5; .5 SOLUTION RESPIRATORY (INHALATION) at 08:31

## 2020-12-28 RX ADMIN — METOROPROLOL TARTRATE 5 MG: 5 INJECTION, SOLUTION INTRAVENOUS at 05:39

## 2020-12-29 ENCOUNTER — APPOINTMENT (OUTPATIENT)
Dept: GENERAL RADIOLOGY | Facility: HOSPITAL | Age: 70
End: 2020-12-29

## 2020-12-29 ENCOUNTER — APPOINTMENT (OUTPATIENT)
Dept: NEPHROLOGY | Facility: HOSPITAL | Age: 70
End: 2020-12-29

## 2020-12-29 LAB
ANION GAP SERPL CALCULATED.3IONS-SCNC: 18 MMOL/L (ref 5–15)
ARTERIAL PATENCY WRIST A: POSITIVE
ATMOSPHERIC PRESS: ABNORMAL MM[HG]
BASE EXCESS BLDA CALC-SCNC: 0.1 MMOL/L (ref 0–2)
BDY SITE: ABNORMAL
BODY TEMPERATURE: 37 C
BUN SERPL-MCNC: 69 MG/DL (ref 8–23)
BUN/CREAT SERPL: 13.2 (ref 7–25)
CALCIUM SPEC-SCNC: 8.6 MG/DL (ref 8.6–10.5)
CHLORIDE SERPL-SCNC: 103 MMOL/L (ref 98–107)
CO2 BLDA-SCNC: 25.5 MMOL/L (ref 22–33)
CO2 SERPL-SCNC: 21 MMOL/L (ref 22–29)
COHGB MFR BLD: 1 % (ref 0–2)
CREAT SERPL-MCNC: 5.24 MG/DL (ref 0.57–1)
DEPRECATED RDW RBC AUTO: 62.4 FL (ref 37–54)
EPAP: 0
ERYTHROCYTE [DISTWIDTH] IN BLOOD BY AUTOMATED COUNT: 16.7 % (ref 12.3–15.4)
GFR SERPL CREATININE-BSD FRML MDRD: 10 ML/MIN/1.73
GFR SERPL CREATININE-BSD FRML MDRD: ABNORMAL ML/MIN/{1.73_M2}
GLUCOSE BLDC GLUCOMTR-MCNC: 169 MG/DL (ref 70–130)
GLUCOSE BLDC GLUCOMTR-MCNC: 169 MG/DL (ref 70–130)
GLUCOSE BLDC GLUCOMTR-MCNC: 212 MG/DL (ref 70–130)
GLUCOSE BLDC GLUCOMTR-MCNC: 281 MG/DL (ref 70–130)
GLUCOSE SERPL-MCNC: 152 MG/DL (ref 65–99)
HCO3 BLDA-SCNC: 24.4 MMOL/L (ref 20–26)
HCT VFR BLD AUTO: 34.4 % (ref 34–46.6)
HCT VFR BLD CALC: 32.3 %
HGB BLD-MCNC: 10 G/DL (ref 12–15.9)
HGB BLDA-MCNC: 10.5 G/DL (ref 14–18)
INHALED O2 CONCENTRATION: 40 %
INR PPP: 3.5 (ref 0.85–1.16)
IPAP: 0
MAGNESIUM SERPL-MCNC: 1.9 MG/DL (ref 1.6–2.4)
MCH RBC QN AUTO: 29.6 PG (ref 26.6–33)
MCHC RBC AUTO-ENTMCNC: 29.1 G/DL (ref 31.5–35.7)
MCV RBC AUTO: 101.8 FL (ref 79–97)
METHGB BLD QL: 0.6 % (ref 0–1.5)
MODALITY: ABNORMAL
NOTE: ABNORMAL
OXYHGB MFR BLDV: 95.9 % (ref 94–99)
PAW @ PEAK INSP FLOW SETTING VENT: 0 CMH2O
PCO2 BLDA: 37.5 MM HG (ref 35–45)
PCO2 TEMP ADJ BLD: 37.5 MM HG (ref 35–45)
PH BLDA: 7.42 PH UNITS (ref 7.35–7.45)
PH, TEMP CORRECTED: 7.42 PH UNITS
PHOSPHATE SERPL-MCNC: 4.3 MG/DL (ref 2.5–4.5)
PLATELET # BLD AUTO: 203 10*3/MM3 (ref 140–450)
PMV BLD AUTO: 10.7 FL (ref 6–12)
PO2 BLDA: 95.1 MM HG (ref 83–108)
PO2 TEMP ADJ BLD: 95.1 MM HG (ref 83–108)
POTASSIUM SERPL-SCNC: 4.2 MMOL/L (ref 3.5–5.2)
PROTHROMBIN TIME: 34.8 SECONDS (ref 11.5–14)
QT INTERVAL: 456 MS
QTC INTERVAL: 519 MS
RBC # BLD AUTO: 3.38 10*6/MM3 (ref 3.77–5.28)
SODIUM SERPL-SCNC: 142 MMOL/L (ref 136–145)
TOTAL RATE: 0 BREATHS/MINUTE
WBC # BLD AUTO: 8.78 10*3/MM3 (ref 3.4–10.8)

## 2020-12-29 PROCEDURE — 94799 UNLISTED PULMONARY SVC/PX: CPT

## 2020-12-29 PROCEDURE — 93005 ELECTROCARDIOGRAM TRACING: CPT | Performed by: NURSE PRACTITIONER

## 2020-12-29 PROCEDURE — 94770: CPT

## 2020-12-29 PROCEDURE — P9041 ALBUMIN (HUMAN),5%, 50ML: HCPCS | Performed by: INTERNAL MEDICINE

## 2020-12-29 PROCEDURE — 99233 SBSQ HOSP IP/OBS HIGH 50: CPT | Performed by: INTERNAL MEDICINE

## 2020-12-29 PROCEDURE — 80048 BASIC METABOLIC PNL TOTAL CA: CPT | Performed by: INTERNAL MEDICINE

## 2020-12-29 PROCEDURE — 71045 X-RAY EXAM CHEST 1 VIEW: CPT

## 2020-12-29 PROCEDURE — 25010000002 LORAZEPAM PER 2 MG: Performed by: INTERNAL MEDICINE

## 2020-12-29 PROCEDURE — 83735 ASSAY OF MAGNESIUM: CPT | Performed by: INTERNAL MEDICINE

## 2020-12-29 PROCEDURE — 94003 VENT MGMT INPAT SUBQ DAY: CPT

## 2020-12-29 PROCEDURE — P9047 ALBUMIN (HUMAN), 25%, 50ML: HCPCS | Performed by: INTERNAL MEDICINE

## 2020-12-29 PROCEDURE — 36600 WITHDRAWAL OF ARTERIAL BLOOD: CPT

## 2020-12-29 PROCEDURE — 63710000001 INSULIN REGULAR HUMAN PER 5 UNITS: Performed by: INTERNAL MEDICINE

## 2020-12-29 PROCEDURE — 84100 ASSAY OF PHOSPHORUS: CPT | Performed by: INTERNAL MEDICINE

## 2020-12-29 PROCEDURE — 93010 ELECTROCARDIOGRAM REPORT: CPT | Performed by: INTERNAL MEDICINE

## 2020-12-29 PROCEDURE — 82805 BLOOD GASES W/O2 SATURATION: CPT

## 2020-12-29 PROCEDURE — 82962 GLUCOSE BLOOD TEST: CPT

## 2020-12-29 PROCEDURE — 25010000002 ALBUMIN HUMAN 25% PER 50 ML: Performed by: INTERNAL MEDICINE

## 2020-12-29 PROCEDURE — 25010000002 ALBUMIN HUMAN 5% PER 50 ML: Performed by: INTERNAL MEDICINE

## 2020-12-29 PROCEDURE — 25010000002 FENTANYL CITRATE (PF) 2500 MCG/50ML SOLUTION: Performed by: INTERNAL MEDICINE

## 2020-12-29 PROCEDURE — 94640 AIRWAY INHALATION TREATMENT: CPT

## 2020-12-29 PROCEDURE — 82375 ASSAY CARBOXYHB QUANT: CPT

## 2020-12-29 PROCEDURE — 25010000002 PIPERACILLIN SOD-TAZOBACTAM PER 1 G: Performed by: INTERNAL MEDICINE

## 2020-12-29 PROCEDURE — 85610 PROTHROMBIN TIME: CPT | Performed by: INTERNAL MEDICINE

## 2020-12-29 PROCEDURE — 85027 COMPLETE CBC AUTOMATED: CPT | Performed by: INTERNAL MEDICINE

## 2020-12-29 PROCEDURE — 83050 HGB METHEMOGLOBIN QUAN: CPT

## 2020-12-29 RX ORDER — ALBUTEROL SULFATE 90 UG/1
2 AEROSOL, METERED RESPIRATORY (INHALATION)
Status: DISCONTINUED | OUTPATIENT
Start: 2020-12-29 | End: 2021-01-07

## 2020-12-29 RX ORDER — ALBUMIN (HUMAN) 12.5 G/50ML
25 SOLUTION INTRAVENOUS AS NEEDED
Status: DISPENSED | OUTPATIENT
Start: 2020-12-30 | End: 2020-12-30

## 2020-12-29 RX ORDER — ALBUMIN, HUMAN INJ 5% 5 %
500 SOLUTION INTRAVENOUS ONCE
Status: COMPLETED | OUTPATIENT
Start: 2020-12-29 | End: 2020-12-29

## 2020-12-29 RX ORDER — PHENYLEPHRINE HCL IN 0.9% NACL 0.5 MG/5ML
.5-3 SYRINGE (ML) INTRAVENOUS
Status: DISCONTINUED | OUTPATIENT
Start: 2020-12-29 | End: 2020-12-30

## 2020-12-29 RX ADMIN — INSULIN HUMAN 5 UNITS: 100 INJECTION, SOLUTION PARENTERAL at 18:03

## 2020-12-29 RX ADMIN — ALBUTEROL SULFATE 2 PUFF: 90 AEROSOL, METERED RESPIRATORY (INHALATION) at 21:09

## 2020-12-29 RX ADMIN — CALCITRIOL CAPSULES 0.25 MCG 0.25 MCG: 0.25 CAPSULE ORAL at 11:32

## 2020-12-29 RX ADMIN — ALBUMIN (HUMAN) 12.5 G: 12.5 SOLUTION INTRAVENOUS at 09:52

## 2020-12-29 RX ADMIN — IPRATROPIUM BROMIDE AND ALBUTEROL SULFATE 3 ML: 2.5; .5 SOLUTION RESPIRATORY (INHALATION) at 07:51

## 2020-12-29 RX ADMIN — Medication 30 ML: at 15:36

## 2020-12-29 RX ADMIN — SODIUM CHLORIDE, PRESERVATIVE FREE 10 ML: 5 INJECTION INTRAVENOUS at 08:12

## 2020-12-29 RX ADMIN — ALBUMIN HUMAN 500 ML: 0.05 INJECTION, SOLUTION INTRAVENOUS at 13:08

## 2020-12-29 RX ADMIN — IPRATROPIUM BROMIDE AND ALBUTEROL SULFATE 3 ML: 2.5; .5 SOLUTION RESPIRATORY (INHALATION) at 13:01

## 2020-12-29 RX ADMIN — CHLORHEXIDINE GLUCONATE 0.12% ORAL RINSE 15 ML: 1.2 LIQUID ORAL at 08:12

## 2020-12-29 RX ADMIN — PANTOPRAZOLE SODIUM 40 MG: 40 INJECTION, POWDER, FOR SOLUTION INTRAVENOUS at 05:30

## 2020-12-29 RX ADMIN — ATORVASTATIN CALCIUM 80 MG: 40 TABLET, FILM COATED ORAL at 20:27

## 2020-12-29 RX ADMIN — SODIUM CHLORIDE, PRESERVATIVE FREE 10 ML: 5 INJECTION INTRAVENOUS at 20:27

## 2020-12-29 RX ADMIN — ALBUMIN (HUMAN) 12.5 G: 12.5 SOLUTION INTRAVENOUS at 10:10

## 2020-12-29 RX ADMIN — IPRATROPIUM BROMIDE 2 PUFF: 17 AEROSOL, METERED RESPIRATORY (INHALATION) at 15:49

## 2020-12-29 RX ADMIN — INSULIN HUMAN 3 UNITS: 100 INJECTION, SOLUTION PARENTERAL at 11:49

## 2020-12-29 RX ADMIN — SERTRALINE HYDROCHLORIDE 50 MG: 50 TABLET ORAL at 11:32

## 2020-12-29 RX ADMIN — TAZOBACTAM SODIUM AND PIPERACILLIN SODIUM 3.38 G: 375; 3 INJECTION, SOLUTION INTRAVENOUS at 00:20

## 2020-12-29 RX ADMIN — IPRATROPIUM BROMIDE 2 PUFF: 17 AEROSOL, METERED RESPIRATORY (INHALATION) at 21:10

## 2020-12-29 RX ADMIN — FENTANYL CITRATE 300 MCG/HR: 0.05 INJECTION, SOLUTION INTRAMUSCULAR; INTRAVENOUS at 00:23

## 2020-12-29 RX ADMIN — ALBUMIN (HUMAN) 12.5 G: 12.5 SOLUTION INTRAVENOUS at 10:20

## 2020-12-29 RX ADMIN — TERAZOSIN HYDROCHLORIDE 1 MG: 1 CAPSULE ORAL at 20:27

## 2020-12-29 RX ADMIN — ALBUTEROL SULFATE 2 PUFF: 90 AEROSOL, METERED RESPIRATORY (INHALATION) at 15:48

## 2020-12-29 RX ADMIN — TAZOBACTAM SODIUM AND PIPERACILLIN SODIUM 3.38 G: 375; 3 INJECTION, SOLUTION INTRAVENOUS at 11:32

## 2020-12-29 RX ADMIN — FENTANYL CITRATE 300 MCG/HR: 0.05 INJECTION, SOLUTION INTRAMUSCULAR; INTRAVENOUS at 08:22

## 2020-12-29 RX ADMIN — CARVEDILOL 12.5 MG: 12.5 TABLET, FILM COATED ORAL at 18:03

## 2020-12-29 RX ADMIN — CHLORHEXIDINE GLUCONATE 0.12% ORAL RINSE 15 ML: 1.2 LIQUID ORAL at 20:27

## 2020-12-29 RX ADMIN — ASPIRIN 81 MG CHEWABLE TABLET 81 MG: 81 TABLET CHEWABLE at 11:32

## 2020-12-29 RX ADMIN — INSULIN HUMAN 3 UNITS: 100 INJECTION, SOLUTION PARENTERAL at 00:20

## 2020-12-29 RX ADMIN — DEXMEDETOMIDINE HYDROCHLORIDE 1 MCG/KG/HR: 200 INJECTION INTRAVENOUS at 20:04

## 2020-12-29 RX ADMIN — NOREPINEPHRINE BITARTRATE 0.02 MCG/KG/MIN: 1 INJECTION, SOLUTION, CONCENTRATE INTRAVENOUS at 11:42

## 2020-12-29 RX ADMIN — LORAZEPAM 2 MG: 2 INJECTION INTRAMUSCULAR; INTRAVENOUS at 00:20

## 2020-12-29 RX ADMIN — Medication 30 ML: at 08:12

## 2020-12-29 RX ADMIN — ALBUMIN (HUMAN) 12.5 G: 12.5 SOLUTION INTRAVENOUS at 08:41

## 2020-12-29 RX ADMIN — INSULIN HUMAN 3 UNITS: 100 INJECTION, SOLUTION PARENTERAL at 05:30

## 2020-12-30 ENCOUNTER — APPOINTMENT (OUTPATIENT)
Dept: NEPHROLOGY | Facility: HOSPITAL | Age: 70
End: 2020-12-30

## 2020-12-30 LAB
ANION GAP SERPL CALCULATED.3IONS-SCNC: 15 MMOL/L (ref 5–15)
ARTERIAL PATENCY WRIST A: ABNORMAL
ATMOSPHERIC PRESS: ABNORMAL MM[HG]
BASE EXCESS BLDA CALC-SCNC: 2 MMOL/L (ref 0–2)
BDY SITE: ABNORMAL
BODY TEMPERATURE: 37 C
BUN SERPL-MCNC: 57 MG/DL (ref 8–23)
BUN/CREAT SERPL: 13.5 (ref 7–25)
CALCIUM SPEC-SCNC: 8.6 MG/DL (ref 8.6–10.5)
CHLORIDE SERPL-SCNC: 99 MMOL/L (ref 98–107)
CO2 BLDA-SCNC: 26.4 MMOL/L (ref 22–33)
CO2 SERPL-SCNC: 23 MMOL/L (ref 22–29)
COHGB MFR BLD: 1.2 % (ref 0–2)
CREAT SERPL-MCNC: 4.22 MG/DL (ref 0.57–1)
DEPRECATED RDW RBC AUTO: 58.7 FL (ref 37–54)
EPAP: 0
ERYTHROCYTE [DISTWIDTH] IN BLOOD BY AUTOMATED COUNT: 16.4 % (ref 12.3–15.4)
GFR SERPL CREATININE-BSD FRML MDRD: 13 ML/MIN/1.73
GFR SERPL CREATININE-BSD FRML MDRD: ABNORMAL ML/MIN/{1.73_M2}
GLUCOSE BLDC GLUCOMTR-MCNC: 161 MG/DL (ref 70–130)
GLUCOSE BLDC GLUCOMTR-MCNC: 218 MG/DL (ref 70–130)
GLUCOSE BLDC GLUCOMTR-MCNC: 223 MG/DL (ref 70–130)
GLUCOSE SERPL-MCNC: 216 MG/DL (ref 65–99)
HCO3 BLDA-SCNC: 25.4 MMOL/L (ref 20–26)
HCT VFR BLD AUTO: 28.4 % (ref 34–46.6)
HCT VFR BLD CALC: 27.4 %
HGB BLD-MCNC: 8.3 G/DL (ref 12–15.9)
HGB BLDA-MCNC: 8.9 G/DL (ref 14–18)
INHALED O2 CONCENTRATION: 30 %
INR PPP: 2.68 (ref 0.85–1.16)
IPAP: 0
MAGNESIUM SERPL-MCNC: 1.8 MG/DL (ref 1.6–2.4)
MCH RBC QN AUTO: 28.3 PG (ref 26.6–33)
MCHC RBC AUTO-ENTMCNC: 29.2 G/DL (ref 31.5–35.7)
MCV RBC AUTO: 96.9 FL (ref 79–97)
METHGB BLD QL: 0.5 % (ref 0–1.5)
MODALITY: ABNORMAL
NOTE: ABNORMAL
OXYHGB MFR BLDV: 95.2 % (ref 94–99)
PAW @ PEAK INSP FLOW SETTING VENT: 0 CMH2O
PCO2 BLDA: 33.7 MM HG (ref 35–45)
PCO2 TEMP ADJ BLD: 33.7 MM HG (ref 35–45)
PH BLDA: 7.49 PH UNITS (ref 7.35–7.45)
PH, TEMP CORRECTED: 7.49 PH UNITS
PHOSPHATE SERPL-MCNC: 2.8 MG/DL (ref 2.5–4.5)
PLATELET # BLD AUTO: 168 10*3/MM3 (ref 140–450)
PMV BLD AUTO: 10.5 FL (ref 6–12)
PO2 BLDA: 81.5 MM HG (ref 83–108)
PO2 TEMP ADJ BLD: 81.5 MM HG (ref 83–108)
POTASSIUM SERPL-SCNC: 3.3 MMOL/L (ref 3.5–5.2)
PROTHROMBIN TIME: 28.2 SECONDS (ref 11.5–14)
RBC # BLD AUTO: 2.93 10*6/MM3 (ref 3.77–5.28)
SODIUM SERPL-SCNC: 137 MMOL/L (ref 136–145)
TOTAL RATE: 0 BREATHS/MINUTE
WBC # BLD AUTO: 8.48 10*3/MM3 (ref 3.4–10.8)

## 2020-12-30 PROCEDURE — 25010000002 ALBUMIN HUMAN 25% PER 50 ML: Performed by: INTERNAL MEDICINE

## 2020-12-30 PROCEDURE — 84100 ASSAY OF PHOSPHORUS: CPT | Performed by: INTERNAL MEDICINE

## 2020-12-30 PROCEDURE — 94799 UNLISTED PULMONARY SVC/PX: CPT

## 2020-12-30 PROCEDURE — 82375 ASSAY CARBOXYHB QUANT: CPT

## 2020-12-30 PROCEDURE — 94770: CPT

## 2020-12-30 PROCEDURE — 36600 WITHDRAWAL OF ARTERIAL BLOOD: CPT

## 2020-12-30 PROCEDURE — 94003 VENT MGMT INPAT SUBQ DAY: CPT

## 2020-12-30 PROCEDURE — 85610 PROTHROMBIN TIME: CPT | Performed by: INTERNAL MEDICINE

## 2020-12-30 PROCEDURE — 25010000002 PIPERACILLIN SOD-TAZOBACTAM PER 1 G: Performed by: INTERNAL MEDICINE

## 2020-12-30 PROCEDURE — P9047 ALBUMIN (HUMAN), 25%, 50ML: HCPCS | Performed by: INTERNAL MEDICINE

## 2020-12-30 PROCEDURE — 99233 SBSQ HOSP IP/OBS HIGH 50: CPT | Performed by: INTERNAL MEDICINE

## 2020-12-30 PROCEDURE — 82962 GLUCOSE BLOOD TEST: CPT

## 2020-12-30 PROCEDURE — 25010000002 LORAZEPAM PER 2 MG: Performed by: INTERNAL MEDICINE

## 2020-12-30 PROCEDURE — 83735 ASSAY OF MAGNESIUM: CPT | Performed by: INTERNAL MEDICINE

## 2020-12-30 PROCEDURE — 82805 BLOOD GASES W/O2 SATURATION: CPT

## 2020-12-30 PROCEDURE — 63710000001 INSULIN REGULAR HUMAN PER 5 UNITS: Performed by: INTERNAL MEDICINE

## 2020-12-30 PROCEDURE — 80048 BASIC METABOLIC PNL TOTAL CA: CPT | Performed by: INTERNAL MEDICINE

## 2020-12-30 PROCEDURE — 25010000002 HYDRALAZINE PER 20 MG: Performed by: NURSE PRACTITIONER

## 2020-12-30 PROCEDURE — 25010000002 FENTANYL CITRATE (PF) 2500 MCG/50ML SOLUTION: Performed by: INTERNAL MEDICINE

## 2020-12-30 PROCEDURE — 85027 COMPLETE CBC AUTOMATED: CPT | Performed by: INTERNAL MEDICINE

## 2020-12-30 PROCEDURE — 83050 HGB METHEMOGLOBIN QUAN: CPT

## 2020-12-30 RX ORDER — DOCUSATE SODIUM 50 MG/5 ML
100 LIQUID (ML) ORAL 2 TIMES DAILY
Status: DISCONTINUED | OUTPATIENT
Start: 2020-12-30 | End: 2021-01-28

## 2020-12-30 RX ORDER — WARFARIN SODIUM 5 MG/1
5 TABLET ORAL
Status: DISCONTINUED | OUTPATIENT
Start: 2020-12-30 | End: 2021-01-05

## 2020-12-30 RX ORDER — ALBUMIN (HUMAN) 12.5 G/50ML
12.5 SOLUTION INTRAVENOUS AS NEEDED
Status: ACTIVE | OUTPATIENT
Start: 2020-12-31 | End: 2020-12-31

## 2020-12-30 RX ADMIN — ALBUTEROL SULFATE 2 PUFF: 90 AEROSOL, METERED RESPIRATORY (INHALATION) at 12:45

## 2020-12-30 RX ADMIN — IPRATROPIUM BROMIDE 2 PUFF: 17 AEROSOL, METERED RESPIRATORY (INHALATION) at 07:10

## 2020-12-30 RX ADMIN — LORAZEPAM 2 MG: 2 INJECTION INTRAMUSCULAR; INTRAVENOUS at 21:22

## 2020-12-30 RX ADMIN — TERAZOSIN HYDROCHLORIDE 1 MG: 1 CAPSULE ORAL at 21:26

## 2020-12-30 RX ADMIN — DOCUSATE SODIUM 100 MG: 50 LIQUID ORAL at 12:47

## 2020-12-30 RX ADMIN — INSULIN HUMAN 5 UNITS: 100 INJECTION, SOLUTION PARENTERAL at 18:16

## 2020-12-30 RX ADMIN — ALBUTEROL SULFATE 2 PUFF: 90 AEROSOL, METERED RESPIRATORY (INHALATION) at 07:41

## 2020-12-30 RX ADMIN — SERTRALINE HYDROCHLORIDE 50 MG: 50 TABLET ORAL at 12:47

## 2020-12-30 RX ADMIN — ALBUTEROL SULFATE 2 PUFF: 90 AEROSOL, METERED RESPIRATORY (INHALATION) at 19:31

## 2020-12-30 RX ADMIN — INSULIN HUMAN 8 UNITS: 100 INJECTION, SOLUTION PARENTERAL at 00:08

## 2020-12-30 RX ADMIN — DEXMEDETOMIDINE HYDROCHLORIDE 1.5 MCG/KG/HR: 200 INJECTION INTRAVENOUS at 21:26

## 2020-12-30 RX ADMIN — CARVEDILOL 12.5 MG: 12.5 TABLET, FILM COATED ORAL at 12:30

## 2020-12-30 RX ADMIN — Medication 30 ML: at 21:27

## 2020-12-30 RX ADMIN — IPRATROPIUM BROMIDE 2 PUFF: 17 AEROSOL, METERED RESPIRATORY (INHALATION) at 16:05

## 2020-12-30 RX ADMIN — PANTOPRAZOLE SODIUM 40 MG: 40 INJECTION, POWDER, FOR SOLUTION INTRAVENOUS at 06:02

## 2020-12-30 RX ADMIN — ALBUTEROL SULFATE 2 PUFF: 90 AEROSOL, METERED RESPIRATORY (INHALATION) at 16:05

## 2020-12-30 RX ADMIN — IPRATROPIUM BROMIDE 2 PUFF: 17 AEROSOL, METERED RESPIRATORY (INHALATION) at 12:45

## 2020-12-30 RX ADMIN — SENNOSIDES 10 ML: 8.8 LIQUID ORAL at 21:26

## 2020-12-30 RX ADMIN — SENNOSIDES 10 ML: 8.8 LIQUID ORAL at 12:46

## 2020-12-30 RX ADMIN — SODIUM CHLORIDE, PRESERVATIVE FREE 10 ML: 5 INJECTION INTRAVENOUS at 21:28

## 2020-12-30 RX ADMIN — WARFARIN SODIUM 5 MG: 5 TABLET ORAL at 18:15

## 2020-12-30 RX ADMIN — DEXMEDETOMIDINE HYDROCHLORIDE 1 MCG/KG/HR: 200 INJECTION INTRAVENOUS at 16:30

## 2020-12-30 RX ADMIN — CHLORHEXIDINE GLUCONATE 0.12% ORAL RINSE 15 ML: 1.2 LIQUID ORAL at 21:26

## 2020-12-30 RX ADMIN — IPRATROPIUM BROMIDE 2 PUFF: 17 AEROSOL, METERED RESPIRATORY (INHALATION) at 19:31

## 2020-12-30 RX ADMIN — CARVEDILOL 12.5 MG: 12.5 TABLET, FILM COATED ORAL at 18:16

## 2020-12-30 RX ADMIN — TAZOBACTAM SODIUM AND PIPERACILLIN SODIUM 3.38 G: 375; 3 INJECTION, SOLUTION INTRAVENOUS at 12:47

## 2020-12-30 RX ADMIN — ASPIRIN 81 MG CHEWABLE TABLET 81 MG: 81 TABLET CHEWABLE at 12:47

## 2020-12-30 RX ADMIN — ATORVASTATIN CALCIUM 80 MG: 40 TABLET, FILM COATED ORAL at 21:26

## 2020-12-30 RX ADMIN — HYDRALAZINE HYDROCHLORIDE 20 MG: 20 INJECTION INTRAMUSCULAR; INTRAVENOUS at 14:11

## 2020-12-30 RX ADMIN — INSULIN HUMAN 3 UNITS: 100 INJECTION, SOLUTION PARENTERAL at 12:46

## 2020-12-30 RX ADMIN — TAZOBACTAM SODIUM AND PIPERACILLIN SODIUM 3.38 G: 375; 3 INJECTION, SOLUTION INTRAVENOUS at 00:08

## 2020-12-30 RX ADMIN — INSULIN HUMAN 5 UNITS: 100 INJECTION, SOLUTION PARENTERAL at 06:02

## 2020-12-30 RX ADMIN — ALBUMIN HUMAN 25 G: 0.25 SOLUTION INTRAVENOUS at 08:05

## 2020-12-30 RX ADMIN — CHLORHEXIDINE GLUCONATE 0.12% ORAL RINSE 15 ML: 1.2 LIQUID ORAL at 08:46

## 2020-12-30 RX ADMIN — DEXMEDETOMIDINE HYDROCHLORIDE 1 MCG/KG/HR: 200 INJECTION INTRAVENOUS at 10:30

## 2020-12-30 RX ADMIN — DOCUSATE SODIUM 100 MG: 50 LIQUID ORAL at 21:26

## 2020-12-30 RX ADMIN — DEXMEDETOMIDINE HYDROCHLORIDE 1 MCG/KG/HR: 200 INJECTION INTRAVENOUS at 02:43

## 2020-12-30 RX ADMIN — FENTANYL CITRATE 300 MCG/HR: 0.05 INJECTION, SOLUTION INTRAMUSCULAR; INTRAVENOUS at 00:26

## 2020-12-31 LAB
ANION GAP SERPL CALCULATED.3IONS-SCNC: 14 MMOL/L (ref 5–15)
ARTERIAL PATENCY WRIST A: ABNORMAL
ATMOSPHERIC PRESS: ABNORMAL MM[HG]
BACTERIA SPEC AEROBE CULT: NORMAL
BACTERIA SPEC AEROBE CULT: NORMAL
BASE EXCESS BLDA CALC-SCNC: 3.5 MMOL/L (ref 0–2)
BDY SITE: ABNORMAL
BODY TEMPERATURE: 37 C
BUN SERPL-MCNC: 45 MG/DL (ref 8–23)
BUN/CREAT SERPL: 14.7 (ref 7–25)
CALCIUM SPEC-SCNC: 8.7 MG/DL (ref 8.6–10.5)
CHLORIDE SERPL-SCNC: 97 MMOL/L (ref 98–107)
CO2 BLDA-SCNC: 28.2 MMOL/L (ref 22–33)
CO2 SERPL-SCNC: 23 MMOL/L (ref 22–29)
COHGB MFR BLD: 1 % (ref 0–2)
CREAT SERPL-MCNC: 3.06 MG/DL (ref 0.57–1)
DEPRECATED RDW RBC AUTO: 60.5 FL (ref 37–54)
ERYTHROCYTE [DISTWIDTH] IN BLOOD BY AUTOMATED COUNT: 16.3 % (ref 12.3–15.4)
GFR SERPL CREATININE-BSD FRML MDRD: 18 ML/MIN/1.73
GLUCOSE BLDC GLUCOMTR-MCNC: 222 MG/DL (ref 70–130)
GLUCOSE BLDC GLUCOMTR-MCNC: 226 MG/DL (ref 70–130)
GLUCOSE BLDC GLUCOMTR-MCNC: 247 MG/DL (ref 70–130)
GLUCOSE BLDC GLUCOMTR-MCNC: 298 MG/DL (ref 70–130)
GLUCOSE BLDC GLUCOMTR-MCNC: 309 MG/DL (ref 70–130)
GLUCOSE SERPL-MCNC: 286 MG/DL (ref 65–99)
HCO3 BLDA-SCNC: 27.1 MMOL/L (ref 20–26)
HCT VFR BLD AUTO: 30.6 % (ref 34–46.6)
HCT VFR BLD CALC: 28 %
HGB BLD-MCNC: 8.8 G/DL (ref 12–15.9)
HGB BLDA-MCNC: 9.1 G/DL (ref 14–18)
INHALED O2 CONCENTRATION: 30 %
INR PPP: 2.08 (ref 0.85–1.16)
MAGNESIUM SERPL-MCNC: 1.9 MG/DL (ref 1.6–2.4)
MCH RBC QN AUTO: 28.8 PG (ref 26.6–33)
MCHC RBC AUTO-ENTMCNC: 28.8 G/DL (ref 31.5–35.7)
MCV RBC AUTO: 100 FL (ref 79–97)
METHGB BLD QL: 0.8 % (ref 0–1.5)
MODALITY: ABNORMAL
NOTE: ABNORMAL
OXYHGB MFR BLDV: 95.3 % (ref 94–99)
PCO2 BLDA: 36.3 MM HG (ref 35–45)
PCO2 TEMP ADJ BLD: 36.3 MM HG (ref 35–45)
PEEP RESPIRATORY: 5 CM[H2O]
PH BLDA: 7.48 PH UNITS (ref 7.35–7.45)
PH, TEMP CORRECTED: 7.48 PH UNITS
PHOSPHATE SERPL-MCNC: 2 MG/DL (ref 2.5–4.5)
PLATELET # BLD AUTO: 166 10*3/MM3 (ref 140–450)
PMV BLD AUTO: 11 FL (ref 6–12)
PO2 BLDA: 85.1 MM HG (ref 83–108)
PO2 TEMP ADJ BLD: 85.1 MM HG (ref 83–108)
POTASSIUM SERPL-SCNC: 3.4 MMOL/L (ref 3.5–5.2)
PROTHROMBIN TIME: 23 SECONDS (ref 11.5–14)
RBC # BLD AUTO: 3.06 10*6/MM3 (ref 3.77–5.28)
SET MECH RESP RATE: 18
SODIUM SERPL-SCNC: 134 MMOL/L (ref 136–145)
TOTAL RATE: 18 BREATHS/MINUTE
VENTILATOR MODE: ABNORMAL
VT ON VENT VENT: 440 ML
WBC # BLD AUTO: 8.46 10*3/MM3 (ref 3.4–10.8)

## 2020-12-31 PROCEDURE — 94799 UNLISTED PULMONARY SVC/PX: CPT

## 2020-12-31 PROCEDURE — 85610 PROTHROMBIN TIME: CPT | Performed by: INTERNAL MEDICINE

## 2020-12-31 PROCEDURE — 83735 ASSAY OF MAGNESIUM: CPT | Performed by: INTERNAL MEDICINE

## 2020-12-31 PROCEDURE — 99233 SBSQ HOSP IP/OBS HIGH 50: CPT | Performed by: INTERNAL MEDICINE

## 2020-12-31 PROCEDURE — 83050 HGB METHEMOGLOBIN QUAN: CPT

## 2020-12-31 PROCEDURE — 25010000002 PIPERACILLIN SOD-TAZOBACTAM PER 1 G: Performed by: INTERNAL MEDICINE

## 2020-12-31 PROCEDURE — 82805 BLOOD GASES W/O2 SATURATION: CPT

## 2020-12-31 PROCEDURE — 84100 ASSAY OF PHOSPHORUS: CPT | Performed by: INTERNAL MEDICINE

## 2020-12-31 PROCEDURE — 94003 VENT MGMT INPAT SUBQ DAY: CPT

## 2020-12-31 PROCEDURE — 36600 WITHDRAWAL OF ARTERIAL BLOOD: CPT

## 2020-12-31 PROCEDURE — 82962 GLUCOSE BLOOD TEST: CPT

## 2020-12-31 PROCEDURE — 80048 BASIC METABOLIC PNL TOTAL CA: CPT | Performed by: INTERNAL MEDICINE

## 2020-12-31 PROCEDURE — 63710000001 INSULIN REGULAR HUMAN PER 5 UNITS: Performed by: INTERNAL MEDICINE

## 2020-12-31 PROCEDURE — 85027 COMPLETE CBC AUTOMATED: CPT | Performed by: INTERNAL MEDICINE

## 2020-12-31 PROCEDURE — 25010000002 HYDRALAZINE PER 20 MG: Performed by: NURSE PRACTITIONER

## 2020-12-31 PROCEDURE — 63710000001 INSULIN DETEMIR PER 5 UNITS: Performed by: INTERNAL MEDICINE

## 2020-12-31 PROCEDURE — 82375 ASSAY CARBOXYHB QUANT: CPT

## 2020-12-31 RX ORDER — HYDROCORTISONE 25 MG/G
CREAM TOPICAL DAILY PRN
Status: DISCONTINUED | OUTPATIENT
Start: 2020-12-31 | End: 2021-02-17 | Stop reason: HOSPADM

## 2020-12-31 RX ADMIN — DEXMEDETOMIDINE HYDROCHLORIDE 1.3 MCG/KG/HR: 200 INJECTION INTRAVENOUS at 04:07

## 2020-12-31 RX ADMIN — Medication 30 ML: at 16:54

## 2020-12-31 RX ADMIN — IPRATROPIUM BROMIDE 2 PUFF: 17 AEROSOL, METERED RESPIRATORY (INHALATION) at 16:21

## 2020-12-31 RX ADMIN — INSULIN HUMAN 5 UNITS: 100 INJECTION, SOLUTION PARENTERAL at 17:05

## 2020-12-31 RX ADMIN — TAZOBACTAM SODIUM AND PIPERACILLIN SODIUM 3.38 G: 375; 3 INJECTION, SOLUTION INTRAVENOUS at 11:42

## 2020-12-31 RX ADMIN — ATORVASTATIN CALCIUM 80 MG: 40 TABLET, FILM COATED ORAL at 21:02

## 2020-12-31 RX ADMIN — CARVEDILOL 12.5 MG: 12.5 TABLET, FILM COATED ORAL at 17:05

## 2020-12-31 RX ADMIN — CARVEDILOL 12.5 MG: 12.5 TABLET, FILM COATED ORAL at 08:22

## 2020-12-31 RX ADMIN — TAZOBACTAM SODIUM AND PIPERACILLIN SODIUM 3.38 G: 375; 3 INJECTION, SOLUTION INTRAVENOUS at 00:06

## 2020-12-31 RX ADMIN — INSULIN HUMAN 8 UNITS: 100 INJECTION, SOLUTION PARENTERAL at 06:39

## 2020-12-31 RX ADMIN — ALBUTEROL SULFATE 2 PUFF: 90 AEROSOL, METERED RESPIRATORY (INHALATION) at 11:08

## 2020-12-31 RX ADMIN — CHLORHEXIDINE GLUCONATE 0.12% ORAL RINSE 15 ML: 1.2 LIQUID ORAL at 08:17

## 2020-12-31 RX ADMIN — HYDRALAZINE HYDROCHLORIDE 20 MG: 20 INJECTION INTRAMUSCULAR; INTRAVENOUS at 00:06

## 2020-12-31 RX ADMIN — Medication 30 ML: at 08:17

## 2020-12-31 RX ADMIN — ALBUTEROL SULFATE 2 PUFF: 90 AEROSOL, METERED RESPIRATORY (INHALATION) at 19:03

## 2020-12-31 RX ADMIN — INSULIN HUMAN 5 UNITS: 100 INJECTION, SOLUTION PARENTERAL at 00:10

## 2020-12-31 RX ADMIN — Medication 30 ML: at 21:02

## 2020-12-31 RX ADMIN — IPRATROPIUM BROMIDE 2 PUFF: 17 AEROSOL, METERED RESPIRATORY (INHALATION) at 11:07

## 2020-12-31 RX ADMIN — WARFARIN SODIUM 5 MG: 5 TABLET ORAL at 17:05

## 2020-12-31 RX ADMIN — INSULIN DETEMIR 10 UNITS: 100 INJECTION, SOLUTION SUBCUTANEOUS at 11:42

## 2020-12-31 RX ADMIN — IPRATROPIUM BROMIDE 2 PUFF: 17 AEROSOL, METERED RESPIRATORY (INHALATION) at 19:03

## 2020-12-31 RX ADMIN — SERTRALINE HYDROCHLORIDE 50 MG: 50 TABLET ORAL at 08:17

## 2020-12-31 RX ADMIN — IPRATROPIUM BROMIDE 2 PUFF: 17 AEROSOL, METERED RESPIRATORY (INHALATION) at 07:10

## 2020-12-31 RX ADMIN — DEXMEDETOMIDINE HYDROCHLORIDE 0.8 MCG/KG/HR: 200 INJECTION INTRAVENOUS at 19:32

## 2020-12-31 RX ADMIN — DEXMEDETOMIDINE HYDROCHLORIDE 0.8 MCG/KG/HR: 200 INJECTION INTRAVENOUS at 10:48

## 2020-12-31 RX ADMIN — INSULIN HUMAN 10 UNITS: 100 INJECTION, SOLUTION PARENTERAL at 11:42

## 2020-12-31 RX ADMIN — TERAZOSIN HYDROCHLORIDE 1 MG: 1 CAPSULE ORAL at 22:27

## 2020-12-31 RX ADMIN — SENNOSIDES 10 ML: 8.8 LIQUID ORAL at 22:27

## 2020-12-31 RX ADMIN — ALBUTEROL SULFATE 2 PUFF: 90 AEROSOL, METERED RESPIRATORY (INHALATION) at 16:21

## 2020-12-31 RX ADMIN — DOCUSATE SODIUM 100 MG: 50 LIQUID ORAL at 21:02

## 2020-12-31 RX ADMIN — DOCUSATE SODIUM 100 MG: 50 LIQUID ORAL at 08:17

## 2020-12-31 RX ADMIN — ASPIRIN 81 MG CHEWABLE TABLET 81 MG: 81 TABLET CHEWABLE at 08:17

## 2020-12-31 RX ADMIN — PANTOPRAZOLE SODIUM 40 MG: 40 INJECTION, POWDER, FOR SOLUTION INTRAVENOUS at 06:40

## 2020-12-31 RX ADMIN — CALCITRIOL CAPSULES 0.25 MCG 0.25 MCG: 0.25 CAPSULE ORAL at 08:17

## 2020-12-31 RX ADMIN — SODIUM CHLORIDE, PRESERVATIVE FREE 10 ML: 5 INJECTION INTRAVENOUS at 08:17

## 2020-12-31 RX ADMIN — HYDROCORTISONE 2.5% 1 APPLICATION: 25 CREAM TOPICAL at 16:54

## 2020-12-31 RX ADMIN — SODIUM CHLORIDE, PRESERVATIVE FREE 10 ML: 5 INJECTION INTRAVENOUS at 21:02

## 2020-12-31 RX ADMIN — CHLORHEXIDINE GLUCONATE 0.12% ORAL RINSE 15 ML: 1.2 LIQUID ORAL at 21:02

## 2020-12-31 RX ADMIN — SENNOSIDES 10 ML: 8.8 LIQUID ORAL at 08:17

## 2020-12-31 RX ADMIN — ALBUTEROL SULFATE 2 PUFF: 90 AEROSOL, METERED RESPIRATORY (INHALATION) at 07:10

## 2021-01-01 ENCOUNTER — APPOINTMENT (OUTPATIENT)
Dept: NEPHROLOGY | Facility: HOSPITAL | Age: 71
End: 2021-01-01

## 2021-01-01 ENCOUNTER — APPOINTMENT (OUTPATIENT)
Dept: GENERAL RADIOLOGY | Facility: HOSPITAL | Age: 71
End: 2021-01-01

## 2021-01-01 LAB
ALBUMIN SERPL-MCNC: 3.6 G/DL (ref 3.5–5.2)
ANION GAP SERPL CALCULATED.3IONS-SCNC: 16 MMOL/L (ref 5–15)
ARTERIAL PATENCY WRIST A: ABNORMAL
ATMOSPHERIC PRESS: ABNORMAL MM[HG]
BASE EXCESS BLDA CALC-SCNC: 3 MMOL/L (ref 0–2)
BDY SITE: ABNORMAL
BODY TEMPERATURE: 37 C
BUN SERPL-MCNC: 59 MG/DL (ref 8–23)
BUN/CREAT SERPL: 16 (ref 7–25)
CALCIUM SPEC-SCNC: 8.6 MG/DL (ref 8.6–10.5)
CHLORIDE SERPL-SCNC: 96 MMOL/L (ref 98–107)
CO2 BLDA-SCNC: 27 MMOL/L (ref 22–33)
CO2 SERPL-SCNC: 23 MMOL/L (ref 22–29)
COHGB MFR BLD: 0.9 % (ref 0–2)
CREAT SERPL-MCNC: 3.69 MG/DL (ref 0.57–1)
DEPRECATED RDW RBC AUTO: 62.3 FL (ref 37–54)
ERYTHROCYTE [DISTWIDTH] IN BLOOD BY AUTOMATED COUNT: 16.5 % (ref 12.3–15.4)
GFR SERPL CREATININE-BSD FRML MDRD: 15 ML/MIN/1.73
GLUCOSE BLDC GLUCOMTR-MCNC: 199 MG/DL (ref 70–130)
GLUCOSE BLDC GLUCOMTR-MCNC: 202 MG/DL (ref 70–130)
GLUCOSE BLDC GLUCOMTR-MCNC: 217 MG/DL (ref 70–130)
GLUCOSE BLDC GLUCOMTR-MCNC: 235 MG/DL (ref 70–130)
GLUCOSE SERPL-MCNC: 212 MG/DL (ref 65–99)
HCO3 BLDA-SCNC: 26 MMOL/L (ref 20–26)
HCT VFR BLD AUTO: 29.2 % (ref 34–46.6)
HCT VFR BLD CALC: 27.3 %
HGB BLD-MCNC: 8.3 G/DL (ref 12–15.9)
HGB BLDA-MCNC: 8.9 G/DL (ref 14–18)
INHALED O2 CONCENTRATION: 30 %
INR PPP: 2.16 (ref 0.85–1.16)
MCH RBC QN AUTO: 29.2 PG (ref 26.6–33)
MCHC RBC AUTO-ENTMCNC: 28.4 G/DL (ref 31.5–35.7)
MCV RBC AUTO: 102.8 FL (ref 79–97)
METHGB BLD QL: 0.4 % (ref 0–1.5)
MODALITY: ABNORMAL
NOTE: ABNORMAL
OXYHGB MFR BLDV: 96.6 % (ref 94–99)
PCO2 BLDA: 32.7 MM HG (ref 35–45)
PCO2 TEMP ADJ BLD: 32.7 MM HG (ref 35–45)
PEEP RESPIRATORY: 5 CM[H2O]
PH BLDA: 7.51 PH UNITS (ref 7.35–7.45)
PH, TEMP CORRECTED: 7.51 PH UNITS
PHOSPHATE SERPL-MCNC: 2.5 MG/DL (ref 2.5–4.5)
PLATELET # BLD AUTO: 167 10*3/MM3 (ref 140–450)
PMV BLD AUTO: 11.5 FL (ref 6–12)
PO2 BLDA: 88.4 MM HG (ref 83–108)
PO2 TEMP ADJ BLD: 88.4 MM HG (ref 83–108)
POTASSIUM SERPL-SCNC: 3.4 MMOL/L (ref 3.5–5.2)
PROTHROMBIN TIME: 23.8 SECONDS (ref 11.5–14)
RBC # BLD AUTO: 2.84 10*6/MM3 (ref 3.77–5.28)
SET MECH RESP RATE: 18
SODIUM SERPL-SCNC: 135 MMOL/L (ref 136–145)
TOTAL RATE: 18 BREATHS/MINUTE
VENTILATOR MODE: ABNORMAL
VT ON VENT VENT: 440 ML
WBC # BLD AUTO: 9.63 10*3/MM3 (ref 3.4–10.8)

## 2021-01-01 PROCEDURE — 82375 ASSAY CARBOXYHB QUANT: CPT

## 2021-01-01 PROCEDURE — 94799 UNLISTED PULMONARY SVC/PX: CPT

## 2021-01-01 PROCEDURE — 25010000002 LORAZEPAM PER 2 MG: Performed by: INTERNAL MEDICINE

## 2021-01-01 PROCEDURE — 25010000002 HEPARIN (PORCINE) PER 1000 UNITS: Performed by: INTERNAL MEDICINE

## 2021-01-01 PROCEDURE — 83050 HGB METHEMOGLOBIN QUAN: CPT

## 2021-01-01 PROCEDURE — 25010000002 PIPERACILLIN SOD-TAZOBACTAM PER 1 G: Performed by: INTERNAL MEDICINE

## 2021-01-01 PROCEDURE — 94003 VENT MGMT INPAT SUBQ DAY: CPT

## 2021-01-01 PROCEDURE — 80069 RENAL FUNCTION PANEL: CPT | Performed by: INTERNAL MEDICINE

## 2021-01-01 PROCEDURE — 63710000001 INSULIN REGULAR HUMAN PER 5 UNITS: Performed by: INTERNAL MEDICINE

## 2021-01-01 PROCEDURE — 82962 GLUCOSE BLOOD TEST: CPT

## 2021-01-01 PROCEDURE — 71045 X-RAY EXAM CHEST 1 VIEW: CPT

## 2021-01-01 PROCEDURE — 36600 WITHDRAWAL OF ARTERIAL BLOOD: CPT

## 2021-01-01 PROCEDURE — 85027 COMPLETE CBC AUTOMATED: CPT | Performed by: INTERNAL MEDICINE

## 2021-01-01 PROCEDURE — 85610 PROTHROMBIN TIME: CPT | Performed by: INTERNAL MEDICINE

## 2021-01-01 PROCEDURE — 82805 BLOOD GASES W/O2 SATURATION: CPT

## 2021-01-01 PROCEDURE — 99233 SBSQ HOSP IP/OBS HIGH 50: CPT | Performed by: INTERNAL MEDICINE

## 2021-01-01 PROCEDURE — 63710000001 INSULIN DETEMIR PER 5 UNITS: Performed by: INTERNAL MEDICINE

## 2021-01-01 RX ORDER — HEPARIN SODIUM 1000 [USP'U]/ML
1800 INJECTION, SOLUTION INTRAVENOUS; SUBCUTANEOUS AS NEEDED
Status: DISCONTINUED | OUTPATIENT
Start: 2021-01-01 | End: 2021-02-17 | Stop reason: HOSPADM

## 2021-01-01 RX ADMIN — ALBUTEROL SULFATE 2 PUFF: 90 AEROSOL, METERED RESPIRATORY (INHALATION) at 19:57

## 2021-01-01 RX ADMIN — SERTRALINE HYDROCHLORIDE 50 MG: 50 TABLET ORAL at 08:46

## 2021-01-01 RX ADMIN — IPRATROPIUM BROMIDE 2 PUFF: 17 AEROSOL, METERED RESPIRATORY (INHALATION) at 13:09

## 2021-01-01 RX ADMIN — ATORVASTATIN CALCIUM 80 MG: 40 TABLET, FILM COATED ORAL at 20:57

## 2021-01-01 RX ADMIN — CARVEDILOL 12.5 MG: 12.5 TABLET, FILM COATED ORAL at 08:47

## 2021-01-01 RX ADMIN — IPRATROPIUM BROMIDE 2 PUFF: 17 AEROSOL, METERED RESPIRATORY (INHALATION) at 19:57

## 2021-01-01 RX ADMIN — WARFARIN SODIUM 5 MG: 5 TABLET ORAL at 17:37

## 2021-01-01 RX ADMIN — PANTOPRAZOLE SODIUM 40 MG: 40 INJECTION, POWDER, FOR SOLUTION INTRAVENOUS at 05:17

## 2021-01-01 RX ADMIN — DOCUSATE SODIUM 100 MG: 50 LIQUID ORAL at 20:57

## 2021-01-01 RX ADMIN — DEXMEDETOMIDINE HYDROCHLORIDE 0.8 MCG/KG/HR: 200 INJECTION INTRAVENOUS at 05:17

## 2021-01-01 RX ADMIN — INSULIN HUMAN 5 UNITS: 100 INJECTION, SOLUTION PARENTERAL at 12:25

## 2021-01-01 RX ADMIN — LORAZEPAM 2 MG: 2 INJECTION INTRAMUSCULAR; INTRAVENOUS at 21:03

## 2021-01-01 RX ADMIN — ALBUTEROL SULFATE 2 PUFF: 90 AEROSOL, METERED RESPIRATORY (INHALATION) at 16:09

## 2021-01-01 RX ADMIN — ALBUTEROL SULFATE 2 PUFF: 90 AEROSOL, METERED RESPIRATORY (INHALATION) at 08:50

## 2021-01-01 RX ADMIN — ALBUTEROL SULFATE 2 PUFF: 90 AEROSOL, METERED RESPIRATORY (INHALATION) at 13:09

## 2021-01-01 RX ADMIN — Medication 30 ML: at 16:55

## 2021-01-01 RX ADMIN — Medication 30 ML: at 08:46

## 2021-01-01 RX ADMIN — IPRATROPIUM BROMIDE 2 PUFF: 17 AEROSOL, METERED RESPIRATORY (INHALATION) at 16:09

## 2021-01-01 RX ADMIN — SENNOSIDES 10 ML: 8.8 LIQUID ORAL at 08:46

## 2021-01-01 RX ADMIN — INSULIN HUMAN 5 UNITS: 100 INJECTION, SOLUTION PARENTERAL at 17:38

## 2021-01-01 RX ADMIN — Medication 30 ML: at 20:57

## 2021-01-01 RX ADMIN — INSULIN DETEMIR 15 UNITS: 100 INJECTION, SOLUTION SUBCUTANEOUS at 10:16

## 2021-01-01 RX ADMIN — TAZOBACTAM SODIUM AND PIPERACILLIN SODIUM 3.38 G: 375; 3 INJECTION, SOLUTION INTRAVENOUS at 00:19

## 2021-01-01 RX ADMIN — CARVEDILOL 12.5 MG: 12.5 TABLET, FILM COATED ORAL at 17:37

## 2021-01-01 RX ADMIN — SODIUM CHLORIDE, PRESERVATIVE FREE 10 ML: 5 INJECTION INTRAVENOUS at 08:48

## 2021-01-01 RX ADMIN — HEPARIN SODIUM 1800 UNITS: 1000 INJECTION INTRAVENOUS; SUBCUTANEOUS at 16:30

## 2021-01-01 RX ADMIN — TAZOBACTAM SODIUM AND PIPERACILLIN SODIUM 3.38 G: 375; 3 INJECTION, SOLUTION INTRAVENOUS at 16:55

## 2021-01-01 RX ADMIN — SODIUM CHLORIDE, PRESERVATIVE FREE 10 ML: 5 INJECTION INTRAVENOUS at 20:57

## 2021-01-01 RX ADMIN — DOCUSATE SODIUM 100 MG: 50 LIQUID ORAL at 08:46

## 2021-01-01 RX ADMIN — CHLORHEXIDINE GLUCONATE 0.12% ORAL RINSE 15 ML: 1.2 LIQUID ORAL at 08:46

## 2021-01-01 RX ADMIN — IPRATROPIUM BROMIDE 2 PUFF: 17 AEROSOL, METERED RESPIRATORY (INHALATION) at 08:50

## 2021-01-01 RX ADMIN — SENNOSIDES 10 ML: 8.8 LIQUID ORAL at 20:59

## 2021-01-01 RX ADMIN — INSULIN HUMAN 5 UNITS: 100 INJECTION, SOLUTION PARENTERAL at 05:31

## 2021-01-01 RX ADMIN — INSULIN HUMAN 5 UNITS: 100 INJECTION, SOLUTION PARENTERAL at 00:18

## 2021-01-01 RX ADMIN — ASPIRIN 81 MG CHEWABLE TABLET 81 MG: 81 TABLET CHEWABLE at 08:46

## 2021-01-01 RX ADMIN — CHLORHEXIDINE GLUCONATE 0.12% ORAL RINSE 15 ML: 1.2 LIQUID ORAL at 20:57

## 2021-01-01 RX ADMIN — TERAZOSIN HYDROCHLORIDE 1 MG: 1 CAPSULE ORAL at 20:57

## 2021-01-02 LAB
GLUCOSE BLDC GLUCOMTR-MCNC: 136 MG/DL (ref 70–130)
GLUCOSE BLDC GLUCOMTR-MCNC: 156 MG/DL (ref 70–130)
GLUCOSE BLDC GLUCOMTR-MCNC: 158 MG/DL (ref 70–130)
GLUCOSE BLDC GLUCOMTR-MCNC: 163 MG/DL (ref 70–130)

## 2021-01-02 PROCEDURE — 25010000002 PIPERACILLIN SOD-TAZOBACTAM PER 1 G: Performed by: INTERNAL MEDICINE

## 2021-01-02 PROCEDURE — 82962 GLUCOSE BLOOD TEST: CPT

## 2021-01-02 PROCEDURE — 25010000002 LORAZEPAM PER 2 MG: Performed by: INTERNAL MEDICINE

## 2021-01-02 PROCEDURE — 63710000001 INSULIN DETEMIR PER 5 UNITS: Performed by: INTERNAL MEDICINE

## 2021-01-02 PROCEDURE — 97166 OT EVAL MOD COMPLEX 45 MIN: CPT

## 2021-01-02 PROCEDURE — 94799 UNLISTED PULMONARY SVC/PX: CPT

## 2021-01-02 PROCEDURE — 99233 SBSQ HOSP IP/OBS HIGH 50: CPT | Performed by: INTERNAL MEDICINE

## 2021-01-02 PROCEDURE — 97162 PT EVAL MOD COMPLEX 30 MIN: CPT

## 2021-01-02 PROCEDURE — 63710000001 INSULIN REGULAR HUMAN PER 5 UNITS: Performed by: INTERNAL MEDICINE

## 2021-01-02 PROCEDURE — 92610 EVALUATE SWALLOWING FUNCTION: CPT

## 2021-01-02 RX ADMIN — SENNOSIDES 10 ML: 8.8 LIQUID ORAL at 20:31

## 2021-01-02 RX ADMIN — ACETAMINOPHEN 650 MG: 325 TABLET, FILM COATED ORAL at 11:50

## 2021-01-02 RX ADMIN — LORAZEPAM 2 MG: 2 INJECTION INTRAMUSCULAR; INTRAVENOUS at 21:30

## 2021-01-02 RX ADMIN — PANTOPRAZOLE SODIUM 40 MG: 40 INJECTION, POWDER, FOR SOLUTION INTRAVENOUS at 05:50

## 2021-01-02 RX ADMIN — IPRATROPIUM BROMIDE 2 PUFF: 17 AEROSOL, METERED RESPIRATORY (INHALATION) at 16:21

## 2021-01-02 RX ADMIN — IPRATROPIUM BROMIDE 2 PUFF: 17 AEROSOL, METERED RESPIRATORY (INHALATION) at 07:12

## 2021-01-02 RX ADMIN — INSULIN HUMAN 3 UNITS: 100 INJECTION, SOLUTION PARENTERAL at 17:20

## 2021-01-02 RX ADMIN — CARVEDILOL 12.5 MG: 12.5 TABLET, FILM COATED ORAL at 08:23

## 2021-01-02 RX ADMIN — SODIUM CHLORIDE, PRESERVATIVE FREE 10 ML: 5 INJECTION INTRAVENOUS at 20:31

## 2021-01-02 RX ADMIN — WARFARIN SODIUM 5 MG: 5 TABLET ORAL at 17:23

## 2021-01-02 RX ADMIN — Medication 30 ML: at 08:23

## 2021-01-02 RX ADMIN — INSULIN HUMAN 3 UNITS: 100 INJECTION, SOLUTION PARENTERAL at 00:10

## 2021-01-02 RX ADMIN — TAZOBACTAM SODIUM AND PIPERACILLIN SODIUM 3.38 G: 375; 3 INJECTION, SOLUTION INTRAVENOUS at 11:49

## 2021-01-02 RX ADMIN — ALBUTEROL SULFATE 2 PUFF: 90 AEROSOL, METERED RESPIRATORY (INHALATION) at 16:20

## 2021-01-02 RX ADMIN — IPRATROPIUM BROMIDE 2 PUFF: 17 AEROSOL, METERED RESPIRATORY (INHALATION) at 20:15

## 2021-01-02 RX ADMIN — INSULIN HUMAN 3 UNITS: 100 INJECTION, SOLUTION PARENTERAL at 11:49

## 2021-01-02 RX ADMIN — ALBUTEROL SULFATE 2 PUFF: 90 AEROSOL, METERED RESPIRATORY (INHALATION) at 13:32

## 2021-01-02 RX ADMIN — LORAZEPAM 2 MG: 2 INJECTION INTRAMUSCULAR; INTRAVENOUS at 17:25

## 2021-01-02 RX ADMIN — Medication 30 ML: at 20:31

## 2021-01-02 RX ADMIN — ACETAMINOPHEN 650 MG: 325 TABLET, FILM COATED ORAL at 00:10

## 2021-01-02 RX ADMIN — ALBUTEROL SULFATE 2 PUFF: 90 AEROSOL, METERED RESPIRATORY (INHALATION) at 07:12

## 2021-01-02 RX ADMIN — INSULIN DETEMIR 15 UNITS: 100 INJECTION, SOLUTION SUBCUTANEOUS at 08:27

## 2021-01-02 RX ADMIN — TAZOBACTAM SODIUM AND PIPERACILLIN SODIUM 3.38 G: 375; 3 INJECTION, SOLUTION INTRAVENOUS at 00:10

## 2021-01-02 RX ADMIN — SERTRALINE HYDROCHLORIDE 50 MG: 50 TABLET ORAL at 08:23

## 2021-01-02 RX ADMIN — CARVEDILOL 12.5 MG: 12.5 TABLET, FILM COATED ORAL at 17:23

## 2021-01-02 RX ADMIN — CHLORHEXIDINE GLUCONATE 0.12% ORAL RINSE 15 ML: 1.2 LIQUID ORAL at 08:23

## 2021-01-02 RX ADMIN — TERAZOSIN HYDROCHLORIDE 1 MG: 1 CAPSULE ORAL at 20:31

## 2021-01-02 RX ADMIN — LORAZEPAM 2 MG: 2 INJECTION INTRAMUSCULAR; INTRAVENOUS at 04:02

## 2021-01-02 RX ADMIN — ASPIRIN 81 MG CHEWABLE TABLET 81 MG: 81 TABLET CHEWABLE at 08:23

## 2021-01-02 RX ADMIN — DOCUSATE SODIUM 100 MG: 50 LIQUID ORAL at 20:31

## 2021-01-02 RX ADMIN — INSULIN HUMAN 3 UNITS: 100 INJECTION, SOLUTION PARENTERAL at 23:35

## 2021-01-02 RX ADMIN — CHLORHEXIDINE GLUCONATE 0.12% ORAL RINSE 15 ML: 1.2 LIQUID ORAL at 21:00

## 2021-01-02 RX ADMIN — ATORVASTATIN CALCIUM 80 MG: 40 TABLET, FILM COATED ORAL at 20:31

## 2021-01-02 RX ADMIN — ALBUTEROL SULFATE 2 PUFF: 90 AEROSOL, METERED RESPIRATORY (INHALATION) at 20:15

## 2021-01-02 RX ADMIN — SODIUM CHLORIDE, PRESERVATIVE FREE 10 ML: 5 INJECTION INTRAVENOUS at 08:26

## 2021-01-02 RX ADMIN — LORAZEPAM 2 MG: 2 INJECTION INTRAMUSCULAR; INTRAVENOUS at 13:50

## 2021-01-02 RX ADMIN — IPRATROPIUM BROMIDE 2 PUFF: 17 AEROSOL, METERED RESPIRATORY (INHALATION) at 13:32

## 2021-01-02 RX ADMIN — Medication 30 ML: at 15:51

## 2021-01-03 LAB
ANION GAP SERPL CALCULATED.3IONS-SCNC: 13 MMOL/L (ref 5–15)
BUN SERPL-MCNC: 57 MG/DL (ref 8–23)
BUN/CREAT SERPL: 14.3 (ref 7–25)
CALCIUM SPEC-SCNC: 8.7 MG/DL (ref 8.6–10.5)
CHLORIDE SERPL-SCNC: 96 MMOL/L (ref 98–107)
CO2 SERPL-SCNC: 24 MMOL/L (ref 22–29)
CREAT SERPL-MCNC: 3.99 MG/DL (ref 0.57–1)
DEPRECATED RDW RBC AUTO: 59.9 FL (ref 37–54)
ERYTHROCYTE [DISTWIDTH] IN BLOOD BY AUTOMATED COUNT: 16 % (ref 12.3–15.4)
GFR SERPL CREATININE-BSD FRML MDRD: 13 ML/MIN/1.73
GFR SERPL CREATININE-BSD FRML MDRD: ABNORMAL ML/MIN/{1.73_M2}
GLUCOSE BLDC GLUCOMTR-MCNC: 157 MG/DL (ref 70–130)
GLUCOSE BLDC GLUCOMTR-MCNC: 168 MG/DL (ref 70–130)
GLUCOSE BLDC GLUCOMTR-MCNC: 173 MG/DL (ref 70–130)
GLUCOSE BLDC GLUCOMTR-MCNC: 184 MG/DL (ref 70–130)
GLUCOSE SERPL-MCNC: 172 MG/DL (ref 65–99)
HCT VFR BLD AUTO: 30.1 % (ref 34–46.6)
HGB BLD-MCNC: 8.9 G/DL (ref 12–15.9)
INR PPP: 2.31 (ref 0.85–1.16)
MAGNESIUM SERPL-MCNC: 2 MG/DL (ref 1.6–2.4)
MCH RBC QN AUTO: 29.9 PG (ref 26.6–33)
MCHC RBC AUTO-ENTMCNC: 29.6 G/DL (ref 31.5–35.7)
MCV RBC AUTO: 101 FL (ref 79–97)
PHOSPHATE SERPL-MCNC: 4.3 MG/DL (ref 2.5–4.5)
PLATELET # BLD AUTO: 220 10*3/MM3 (ref 140–450)
PMV BLD AUTO: 11.3 FL (ref 6–12)
POTASSIUM SERPL-SCNC: 3.3 MMOL/L (ref 3.5–5.2)
PROTHROMBIN TIME: 25 SECONDS (ref 11.5–14)
RBC # BLD AUTO: 2.98 10*6/MM3 (ref 3.77–5.28)
SODIUM SERPL-SCNC: 133 MMOL/L (ref 136–145)
WBC # BLD AUTO: 10.66 10*3/MM3 (ref 3.4–10.8)

## 2021-01-03 PROCEDURE — 80048 BASIC METABOLIC PNL TOTAL CA: CPT | Performed by: INTERNAL MEDICINE

## 2021-01-03 PROCEDURE — 63710000001 INSULIN REGULAR HUMAN PER 5 UNITS: Performed by: INTERNAL MEDICINE

## 2021-01-03 PROCEDURE — 85027 COMPLETE CBC AUTOMATED: CPT | Performed by: INTERNAL MEDICINE

## 2021-01-03 PROCEDURE — 94799 UNLISTED PULMONARY SVC/PX: CPT

## 2021-01-03 PROCEDURE — 82962 GLUCOSE BLOOD TEST: CPT

## 2021-01-03 PROCEDURE — 85610 PROTHROMBIN TIME: CPT | Performed by: INTERNAL MEDICINE

## 2021-01-03 PROCEDURE — 99233 SBSQ HOSP IP/OBS HIGH 50: CPT | Performed by: INTERNAL MEDICINE

## 2021-01-03 PROCEDURE — 83735 ASSAY OF MAGNESIUM: CPT | Performed by: INTERNAL MEDICINE

## 2021-01-03 PROCEDURE — 63710000001 INSULIN DETEMIR PER 5 UNITS: Performed by: INTERNAL MEDICINE

## 2021-01-03 PROCEDURE — 84100 ASSAY OF PHOSPHORUS: CPT | Performed by: INTERNAL MEDICINE

## 2021-01-03 RX ORDER — RISPERIDONE 1 MG/ML
1 SOLUTION ORAL NIGHTLY
Status: DISCONTINUED | OUTPATIENT
Start: 2021-01-03 | End: 2021-01-11

## 2021-01-03 RX ORDER — ALBUMIN (HUMAN) 12.5 G/50ML
12.5 SOLUTION INTRAVENOUS AS NEEDED
Status: ACTIVE | OUTPATIENT
Start: 2021-01-04 | End: 2021-01-04

## 2021-01-03 RX ORDER — CARVEDILOL 12.5 MG/1
12.5 TABLET ORAL ONCE
Status: COMPLETED | OUTPATIENT
Start: 2021-01-03 | End: 2021-01-03

## 2021-01-03 RX ORDER — CARVEDILOL 12.5 MG/1
25 TABLET ORAL 2 TIMES DAILY WITH MEALS
Status: DISCONTINUED | OUTPATIENT
Start: 2021-01-03 | End: 2021-01-11

## 2021-01-03 RX ORDER — HYDRALAZINE HYDROCHLORIDE 25 MG/1
25 TABLET, FILM COATED ORAL EVERY 8 HOURS PRN
Status: DISCONTINUED | OUTPATIENT
Start: 2021-01-03 | End: 2021-01-04

## 2021-01-03 RX ADMIN — INSULIN HUMAN 3 UNITS: 100 INJECTION, SOLUTION PARENTERAL at 17:16

## 2021-01-03 RX ADMIN — WARFARIN SODIUM 5 MG: 5 TABLET ORAL at 17:16

## 2021-01-03 RX ADMIN — ALBUTEROL SULFATE 2 PUFF: 90 AEROSOL, METERED RESPIRATORY (INHALATION) at 15:44

## 2021-01-03 RX ADMIN — SODIUM CHLORIDE, PRESERVATIVE FREE 10 ML: 5 INJECTION INTRAVENOUS at 20:25

## 2021-01-03 RX ADMIN — IPRATROPIUM BROMIDE 2 PUFF: 17 AEROSOL, METERED RESPIRATORY (INHALATION) at 15:43

## 2021-01-03 RX ADMIN — Medication 30 ML: at 17:16

## 2021-01-03 RX ADMIN — SODIUM CHLORIDE, PRESERVATIVE FREE 10 ML: 5 INJECTION INTRAVENOUS at 09:46

## 2021-01-03 RX ADMIN — ALBUTEROL SULFATE 2 PUFF: 90 AEROSOL, METERED RESPIRATORY (INHALATION) at 08:55

## 2021-01-03 RX ADMIN — HYDRALAZINE HYDROCHLORIDE 25 MG: 25 TABLET, FILM COATED ORAL at 11:25

## 2021-01-03 RX ADMIN — SENNOSIDES 10 ML: 8.8 LIQUID ORAL at 20:25

## 2021-01-03 RX ADMIN — RISPERIDONE 1 MG: 1 SOLUTION ORAL at 20:25

## 2021-01-03 RX ADMIN — CHLORHEXIDINE GLUCONATE 0.12% ORAL RINSE 15 ML: 1.2 LIQUID ORAL at 09:45

## 2021-01-03 RX ADMIN — INSULIN HUMAN 3 UNITS: 100 INJECTION, SOLUTION PARENTERAL at 11:25

## 2021-01-03 RX ADMIN — ATORVASTATIN CALCIUM 80 MG: 40 TABLET, FILM COATED ORAL at 20:25

## 2021-01-03 RX ADMIN — INSULIN HUMAN 3 UNITS: 100 INJECTION, SOLUTION PARENTERAL at 05:44

## 2021-01-03 RX ADMIN — TERAZOSIN HYDROCHLORIDE 1 MG: 1 CAPSULE ORAL at 20:25

## 2021-01-03 RX ADMIN — CARVEDILOL 12.5 MG: 12.5 TABLET, FILM COATED ORAL at 11:25

## 2021-01-03 RX ADMIN — INSULIN DETEMIR 15 UNITS: 100 INJECTION, SOLUTION SUBCUTANEOUS at 09:45

## 2021-01-03 RX ADMIN — CARVEDILOL 25 MG: 12.5 TABLET, FILM COATED ORAL at 17:15

## 2021-01-03 RX ADMIN — PANTOPRAZOLE SODIUM 40 MG: 40 INJECTION, POWDER, FOR SOLUTION INTRAVENOUS at 05:44

## 2021-01-03 RX ADMIN — CARVEDILOL 12.5 MG: 12.5 TABLET, FILM COATED ORAL at 09:47

## 2021-01-03 RX ADMIN — Medication 30 ML: at 09:45

## 2021-01-03 RX ADMIN — ASPIRIN 81 MG CHEWABLE TABLET 81 MG: 81 TABLET CHEWABLE at 09:45

## 2021-01-03 RX ADMIN — IPRATROPIUM BROMIDE 2 PUFF: 17 AEROSOL, METERED RESPIRATORY (INHALATION) at 08:55

## 2021-01-03 RX ADMIN — Medication 30 ML: at 20:25

## 2021-01-03 RX ADMIN — DOCUSATE SODIUM 100 MG: 50 LIQUID ORAL at 20:25

## 2021-01-03 RX ADMIN — SERTRALINE HYDROCHLORIDE 50 MG: 50 TABLET ORAL at 09:46

## 2021-01-04 ENCOUNTER — APPOINTMENT (OUTPATIENT)
Dept: NEPHROLOGY | Facility: HOSPITAL | Age: 71
End: 2021-01-04

## 2021-01-04 PROBLEM — R13.10 DYSPHAGIA: Status: ACTIVE | Noted: 2021-01-04

## 2021-01-04 LAB
ALBUMIN SERPL-MCNC: 3.5 G/DL (ref 3.5–5.2)
ALP SERPL-CCNC: 93 U/L (ref 39–117)
ALT SERPL W P-5'-P-CCNC: 9 U/L (ref 1–33)
ANION GAP SERPL CALCULATED.3IONS-SCNC: 13 MMOL/L (ref 5–15)
AST SERPL-CCNC: 13 U/L (ref 1–32)
BILIRUB SERPL-MCNC: 0.3 MG/DL (ref 0–1.2)
BUN SERPL-MCNC: 65 MG/DL (ref 8–23)
CALCIUM SPEC-SCNC: 8.7 MG/DL (ref 8.6–10.5)
CHLORIDE SERPL-SCNC: 94 MMOL/L (ref 98–107)
CHOLEST SERPL-MCNC: 78 MG/DL (ref 0–200)
CO2 SERPL-SCNC: 25 MMOL/L (ref 22–29)
CREAT SERPL-MCNC: 4.8 MG/DL (ref 0.57–1)
CRP SERPL-MCNC: 3.02 MG/DL (ref 0–0.5)
DEPRECATED RDW RBC AUTO: 58.4 FL (ref 37–54)
ERYTHROCYTE [DISTWIDTH] IN BLOOD BY AUTOMATED COUNT: 15.9 % (ref 12.3–15.4)
GLUCOSE BLDC GLUCOMTR-MCNC: 108 MG/DL (ref 70–130)
GLUCOSE BLDC GLUCOMTR-MCNC: 142 MG/DL (ref 70–130)
GLUCOSE BLDC GLUCOMTR-MCNC: 190 MG/DL (ref 70–130)
GLUCOSE BLDC GLUCOMTR-MCNC: 191 MG/DL (ref 70–130)
GLUCOSE BLDC GLUCOMTR-MCNC: 214 MG/DL (ref 70–130)
GLUCOSE BLDC GLUCOMTR-MCNC: 227 MG/DL (ref 70–130)
GLUCOSE SERPL-MCNC: 188 MG/DL (ref 65–99)
HCT VFR BLD AUTO: 29.6 % (ref 34–46.6)
HGB BLD-MCNC: 8.7 G/DL (ref 12–15.9)
INR PPP: 2.4 (ref 0.85–1.16)
MAGNESIUM SERPL-MCNC: 2.1 MG/DL (ref 1.6–2.4)
MAGNESIUM SERPL-MCNC: 2.1 MG/DL (ref 1.6–2.4)
MCH RBC QN AUTO: 29.4 PG (ref 26.6–33)
MCHC RBC AUTO-ENTMCNC: 29.4 G/DL (ref 31.5–35.7)
MCV RBC AUTO: 100 FL (ref 79–97)
PHOSPHATE SERPL-MCNC: 4.4 MG/DL (ref 2.5–4.5)
PHOSPHATE SERPL-MCNC: 4.4 MG/DL (ref 2.5–4.5)
PLATELET # BLD AUTO: 224 10*3/MM3 (ref 140–450)
PMV BLD AUTO: 10.9 FL (ref 6–12)
POTASSIUM SERPL-SCNC: 3.1 MMOL/L (ref 3.5–5.2)
PREALB SERPL-MCNC: 24.9 MG/DL (ref 20–40)
PROT SERPL-MCNC: 7.2 G/DL (ref 6–8.5)
PROTHROMBIN TIME: 25.8 SECONDS (ref 11.5–14)
RBC # BLD AUTO: 2.96 10*6/MM3 (ref 3.77–5.28)
SODIUM SERPL-SCNC: 132 MMOL/L (ref 136–145)
TRIGL SERPL-MCNC: 139 MG/DL (ref 0–150)
WBC # BLD AUTO: 9.57 10*3/MM3 (ref 3.4–10.8)

## 2021-01-04 PROCEDURE — 84134 ASSAY OF PREALBUMIN: CPT | Performed by: INTERNAL MEDICINE

## 2021-01-04 PROCEDURE — 92610 EVALUATE SWALLOWING FUNCTION: CPT

## 2021-01-04 PROCEDURE — 84478 ASSAY OF TRIGLYCERIDES: CPT | Performed by: INTERNAL MEDICINE

## 2021-01-04 PROCEDURE — 86140 C-REACTIVE PROTEIN: CPT | Performed by: INTERNAL MEDICINE

## 2021-01-04 PROCEDURE — 25010000002 EPOETIN ALFA-EPBX 10000 UNIT/ML SOLUTION: Performed by: INTERNAL MEDICINE

## 2021-01-04 PROCEDURE — 94799 UNLISTED PULMONARY SVC/PX: CPT

## 2021-01-04 PROCEDURE — 82962 GLUCOSE BLOOD TEST: CPT

## 2021-01-04 PROCEDURE — 84100 ASSAY OF PHOSPHORUS: CPT | Performed by: INTERNAL MEDICINE

## 2021-01-04 PROCEDURE — 80053 COMPREHEN METABOLIC PANEL: CPT | Performed by: INTERNAL MEDICINE

## 2021-01-04 PROCEDURE — 97110 THERAPEUTIC EXERCISES: CPT

## 2021-01-04 PROCEDURE — 83735 ASSAY OF MAGNESIUM: CPT | Performed by: INTERNAL MEDICINE

## 2021-01-04 PROCEDURE — 63710000001 INSULIN REGULAR HUMAN PER 5 UNITS: Performed by: INTERNAL MEDICINE

## 2021-01-04 PROCEDURE — 85027 COMPLETE CBC AUTOMATED: CPT | Performed by: INTERNAL MEDICINE

## 2021-01-04 PROCEDURE — 85610 PROTHROMBIN TIME: CPT | Performed by: INTERNAL MEDICINE

## 2021-01-04 PROCEDURE — 82465 ASSAY BLD/SERUM CHOLESTEROL: CPT | Performed by: INTERNAL MEDICINE

## 2021-01-04 PROCEDURE — 25010000002 HEPARIN (PORCINE) PER 1000 UNITS: Performed by: INTERNAL MEDICINE

## 2021-01-04 PROCEDURE — 99232 SBSQ HOSP IP/OBS MODERATE 35: CPT | Performed by: NURSE PRACTITIONER

## 2021-01-04 RX ORDER — ALBUMIN (HUMAN) 12.5 G/50ML
12.5 SOLUTION INTRAVENOUS AS NEEDED
Status: DISCONTINUED | OUTPATIENT
Start: 2021-01-05 | End: 2021-01-06

## 2021-01-04 RX ADMIN — DOCUSATE SODIUM 100 MG: 50 LIQUID ORAL at 13:15

## 2021-01-04 RX ADMIN — HEPARIN SODIUM 1800 UNITS: 1000 INJECTION INTRAVENOUS; SUBCUTANEOUS at 11:29

## 2021-01-04 RX ADMIN — ATORVASTATIN CALCIUM 80 MG: 40 TABLET, FILM COATED ORAL at 21:34

## 2021-01-04 RX ADMIN — ASPIRIN 81 MG CHEWABLE TABLET 81 MG: 81 TABLET CHEWABLE at 13:14

## 2021-01-04 RX ADMIN — DOCUSATE SODIUM 100 MG: 50 LIQUID ORAL at 21:34

## 2021-01-04 RX ADMIN — TERAZOSIN HYDROCHLORIDE 1 MG: 1 CAPSULE ORAL at 21:45

## 2021-01-04 RX ADMIN — INSULIN HUMAN 5 UNITS: 100 INJECTION, SOLUTION PARENTERAL at 01:03

## 2021-01-04 RX ADMIN — SERTRALINE HYDROCHLORIDE 50 MG: 50 TABLET ORAL at 13:14

## 2021-01-04 RX ADMIN — SODIUM CHLORIDE, PRESERVATIVE FREE 10 ML: 5 INJECTION INTRAVENOUS at 21:34

## 2021-01-04 RX ADMIN — RISPERIDONE 1 MG: 1 SOLUTION ORAL at 21:46

## 2021-01-04 RX ADMIN — CARVEDILOL 25 MG: 12.5 TABLET, FILM COATED ORAL at 13:15

## 2021-01-04 RX ADMIN — SENNOSIDES 10 ML: 8.8 LIQUID ORAL at 21:34

## 2021-01-04 RX ADMIN — Medication 30 ML: at 13:00

## 2021-01-04 RX ADMIN — Medication 30 ML: at 21:42

## 2021-01-04 RX ADMIN — ACETAMINOPHEN 650 MG: 325 TABLET, FILM COATED ORAL at 06:14

## 2021-01-04 RX ADMIN — IPRATROPIUM BROMIDE 2 PUFF: 17 AEROSOL, METERED RESPIRATORY (INHALATION) at 13:29

## 2021-01-04 RX ADMIN — ALBUTEROL SULFATE 2 PUFF: 90 AEROSOL, METERED RESPIRATORY (INHALATION) at 13:30

## 2021-01-04 RX ADMIN — SODIUM CHLORIDE, PRESERVATIVE FREE 10 ML: 5 INJECTION INTRAVENOUS at 13:18

## 2021-01-04 RX ADMIN — PANTOPRAZOLE SODIUM 40 MG: 40 INJECTION, POWDER, FOR SOLUTION INTRAVENOUS at 06:14

## 2021-01-05 ENCOUNTER — APPOINTMENT (OUTPATIENT)
Dept: NEPHROLOGY | Facility: HOSPITAL | Age: 71
End: 2021-01-05

## 2021-01-05 ENCOUNTER — APPOINTMENT (OUTPATIENT)
Dept: GENERAL RADIOLOGY | Facility: HOSPITAL | Age: 71
End: 2021-01-05

## 2021-01-05 LAB
ALBUMIN SERPL-MCNC: 3.4 G/DL (ref 3.5–5.2)
ALBUMIN SERPL-MCNC: 3.6 G/DL (ref 3.5–5.2)
ANION GAP SERPL CALCULATED.3IONS-SCNC: 15 MMOL/L (ref 5–15)
ANION GAP SERPL CALCULATED.3IONS-SCNC: 15 MMOL/L (ref 5–15)
BUN SERPL-MCNC: 27 MG/DL (ref 8–23)
BUN SERPL-MCNC: 51 MG/DL (ref 8–23)
BUN/CREAT SERPL: 11.3 (ref 7–25)
BUN/CREAT SERPL: 13 (ref 7–25)
CALCIUM SPEC-SCNC: 8.7 MG/DL (ref 8.6–10.5)
CALCIUM SPEC-SCNC: 8.8 MG/DL (ref 8.6–10.5)
CHLORIDE SERPL-SCNC: 95 MMOL/L (ref 98–107)
CHLORIDE SERPL-SCNC: 96 MMOL/L (ref 98–107)
CO2 SERPL-SCNC: 19 MMOL/L (ref 22–29)
CO2 SERPL-SCNC: 23 MMOL/L (ref 22–29)
CREAT SERPL-MCNC: 2.4 MG/DL (ref 0.57–1)
CREAT SERPL-MCNC: 3.91 MG/DL (ref 0.57–1)
DEPRECATED RDW RBC AUTO: 57.1 FL (ref 37–54)
ERYTHROCYTE [DISTWIDTH] IN BLOOD BY AUTOMATED COUNT: 15.9 % (ref 12.3–15.4)
GFR SERPL CREATININE-BSD FRML MDRD: 14 ML/MIN/1.73
GFR SERPL CREATININE-BSD FRML MDRD: 24 ML/MIN/1.73
GFR SERPL CREATININE-BSD FRML MDRD: ABNORMAL ML/MIN/{1.73_M2}
GLUCOSE BLDC GLUCOMTR-MCNC: 184 MG/DL (ref 70–130)
GLUCOSE BLDC GLUCOMTR-MCNC: 231 MG/DL (ref 70–130)
GLUCOSE BLDC GLUCOMTR-MCNC: 235 MG/DL (ref 70–130)
GLUCOSE BLDC GLUCOMTR-MCNC: 257 MG/DL (ref 70–130)
GLUCOSE SERPL-MCNC: 212 MG/DL (ref 65–99)
GLUCOSE SERPL-MCNC: 255 MG/DL (ref 65–99)
HCT VFR BLD AUTO: 29.7 % (ref 34–46.6)
HGB BLD-MCNC: 8.8 G/DL (ref 12–15.9)
INR PPP: 1.96 (ref 0.85–1.16)
MAGNESIUM SERPL-MCNC: 2.1 MG/DL (ref 1.6–2.4)
MCH RBC QN AUTO: 28.8 PG (ref 26.6–33)
MCHC RBC AUTO-ENTMCNC: 29.6 G/DL (ref 31.5–35.7)
MCV RBC AUTO: 97.1 FL (ref 79–97)
PHOSPHATE SERPL-MCNC: 2.4 MG/DL (ref 2.5–4.5)
PHOSPHATE SERPL-MCNC: 3 MG/DL (ref 2.5–4.5)
PLATELET # BLD AUTO: 252 10*3/MM3 (ref 140–450)
PMV BLD AUTO: 11.4 FL (ref 6–12)
POTASSIUM SERPL-SCNC: 3.2 MMOL/L (ref 3.5–5.2)
POTASSIUM SERPL-SCNC: 4.4 MMOL/L (ref 3.5–5.2)
PROTHROMBIN TIME: 22 SECONDS (ref 11.5–14)
RBC # BLD AUTO: 3.06 10*6/MM3 (ref 3.77–5.28)
SODIUM SERPL-SCNC: 129 MMOL/L (ref 136–145)
SODIUM SERPL-SCNC: 134 MMOL/L (ref 136–145)
WBC # BLD AUTO: 10.47 10*3/MM3 (ref 3.4–10.8)

## 2021-01-05 PROCEDURE — 94799 UNLISTED PULMONARY SVC/PX: CPT

## 2021-01-05 PROCEDURE — 25010000002 ALTEPLASE 2 MG RECONSTITUTED SOLUTION 1 EACH VIAL: Performed by: INTERNAL MEDICINE

## 2021-01-05 PROCEDURE — 71045 X-RAY EXAM CHEST 1 VIEW: CPT

## 2021-01-05 PROCEDURE — 25010000002 HYDRALAZINE PER 20 MG: Performed by: INTERNAL MEDICINE

## 2021-01-05 PROCEDURE — 99232 SBSQ HOSP IP/OBS MODERATE 35: CPT | Performed by: NURSE PRACTITIONER

## 2021-01-05 PROCEDURE — 25010000002 EPOETIN ALFA-EPBX 10000 UNIT/ML SOLUTION: Performed by: INTERNAL MEDICINE

## 2021-01-05 PROCEDURE — 80069 RENAL FUNCTION PANEL: CPT | Performed by: NURSE PRACTITIONER

## 2021-01-05 PROCEDURE — 63710000001 INSULIN REGULAR HUMAN PER 5 UNITS: Performed by: INTERNAL MEDICINE

## 2021-01-05 PROCEDURE — 92526 ORAL FUNCTION THERAPY: CPT

## 2021-01-05 PROCEDURE — 85610 PROTHROMBIN TIME: CPT | Performed by: INTERNAL MEDICINE

## 2021-01-05 PROCEDURE — 25010000002 HEPARIN (PORCINE) PER 1000 UNITS: Performed by: INTERNAL MEDICINE

## 2021-01-05 PROCEDURE — 85027 COMPLETE CBC AUTOMATED: CPT | Performed by: NURSE PRACTITIONER

## 2021-01-05 PROCEDURE — 82962 GLUCOSE BLOOD TEST: CPT

## 2021-01-05 PROCEDURE — 83735 ASSAY OF MAGNESIUM: CPT | Performed by: NURSE PRACTITIONER

## 2021-01-05 RX ORDER — MAGNESIUM SULFATE 1 G/100ML
1 INJECTION INTRAVENOUS AS NEEDED
Status: DISCONTINUED | OUTPATIENT
Start: 2021-01-05 | End: 2021-02-17 | Stop reason: HOSPADM

## 2021-01-05 RX ORDER — MAGNESIUM SULFATE HEPTAHYDRATE 40 MG/ML
2 INJECTION, SOLUTION INTRAVENOUS AS NEEDED
Status: DISCONTINUED | OUTPATIENT
Start: 2021-01-05 | End: 2021-02-17 | Stop reason: HOSPADM

## 2021-01-05 RX ORDER — MAGNESIUM SULFATE HEPTAHYDRATE 40 MG/ML
4 INJECTION, SOLUTION INTRAVENOUS AS NEEDED
Status: DISCONTINUED | OUTPATIENT
Start: 2021-01-05 | End: 2021-02-17 | Stop reason: HOSPADM

## 2021-01-05 RX ORDER — POTASSIUM CHLORIDE 750 MG/1
40 CAPSULE, EXTENDED RELEASE ORAL AS NEEDED
Status: DISCONTINUED | OUTPATIENT
Start: 2021-01-05 | End: 2021-01-23

## 2021-01-05 RX ORDER — WARFARIN SODIUM 3 MG/1
6 TABLET ORAL
Status: DISCONTINUED | OUTPATIENT
Start: 2021-01-05 | End: 2021-01-08

## 2021-01-05 RX ORDER — POTASSIUM CHLORIDE 1.5 G/1.77G
40 POWDER, FOR SOLUTION ORAL AS NEEDED
Status: DISCONTINUED | OUTPATIENT
Start: 2021-01-05 | End: 2021-01-23

## 2021-01-05 RX ADMIN — POTASSIUM & SODIUM PHOSPHATES POWDER PACK 280-160-250 MG 1 PACKET: 280-160-250 PACK at 22:27

## 2021-01-05 RX ADMIN — INSULIN HUMAN 3 UNITS: 100 INJECTION, SOLUTION PARENTERAL at 13:23

## 2021-01-05 RX ADMIN — WARFARIN SODIUM 6 MG: 3 TABLET ORAL at 18:24

## 2021-01-05 RX ADMIN — HYDRALAZINE HYDROCHLORIDE 20 MG: 20 INJECTION INTRAMUSCULAR; INTRAVENOUS at 16:51

## 2021-01-05 RX ADMIN — DOCUSATE SODIUM 100 MG: 50 LIQUID ORAL at 21:11

## 2021-01-05 RX ADMIN — SODIUM CHLORIDE, PRESERVATIVE FREE 10 ML: 5 INJECTION INTRAVENOUS at 21:11

## 2021-01-05 RX ADMIN — ALBUTEROL SULFATE 2 PUFF: 90 AEROSOL, METERED RESPIRATORY (INHALATION) at 20:41

## 2021-01-05 RX ADMIN — IPRATROPIUM BROMIDE 2 PUFF: 17 AEROSOL, METERED RESPIRATORY (INHALATION) at 12:51

## 2021-01-05 RX ADMIN — SERTRALINE HYDROCHLORIDE 50 MG: 50 TABLET ORAL at 14:59

## 2021-01-05 RX ADMIN — IPRATROPIUM BROMIDE 2 PUFF: 17 AEROSOL, METERED RESPIRATORY (INHALATION) at 20:41

## 2021-01-05 RX ADMIN — ALBUTEROL SULFATE 2 PUFF: 90 AEROSOL, METERED RESPIRATORY (INHALATION) at 12:52

## 2021-01-05 RX ADMIN — PANTOPRAZOLE SODIUM 40 MG: 40 INJECTION, POWDER, FOR SOLUTION INTRAVENOUS at 06:27

## 2021-01-05 RX ADMIN — Medication 30 ML: at 21:11

## 2021-01-05 RX ADMIN — TERAZOSIN HYDROCHLORIDE 1 MG: 1 CAPSULE ORAL at 21:12

## 2021-01-05 RX ADMIN — EPOETIN ALFA-EPBX 10000 UNITS: 10000 INJECTION, SOLUTION INTRAVENOUS; SUBCUTANEOUS at 13:58

## 2021-01-05 RX ADMIN — ATORVASTATIN CALCIUM 80 MG: 40 TABLET, FILM COATED ORAL at 21:12

## 2021-01-05 RX ADMIN — HEPARIN SODIUM 1800 UNITS: 1000 INJECTION INTRAVENOUS; SUBCUTANEOUS at 13:58

## 2021-01-05 RX ADMIN — Medication 30 ML: at 14:59

## 2021-01-05 RX ADMIN — SENNOSIDES 10 ML: 8.8 LIQUID ORAL at 21:11

## 2021-01-05 RX ADMIN — WATER: 1 INJECTION INTRAMUSCULAR; INTRAVENOUS; SUBCUTANEOUS at 10:03

## 2021-01-05 RX ADMIN — INSULIN HUMAN 5 UNITS: 100 INJECTION, SOLUTION PARENTERAL at 18:25

## 2021-01-05 RX ADMIN — SODIUM CHLORIDE, PRESERVATIVE FREE 10 ML: 5 INJECTION INTRAVENOUS at 14:59

## 2021-01-05 RX ADMIN — ACETAMINOPHEN 650 MG: 325 TABLET, FILM COATED ORAL at 14:59

## 2021-01-05 RX ADMIN — RISPERIDONE 1 MG: 1 SOLUTION ORAL at 21:11

## 2021-01-05 RX ADMIN — ASPIRIN 81 MG CHEWABLE TABLET 81 MG: 81 TABLET CHEWABLE at 14:58

## 2021-01-06 LAB
DEPRECATED RDW RBC AUTO: 56 FL (ref 37–54)
ERYTHROCYTE [DISTWIDTH] IN BLOOD BY AUTOMATED COUNT: 15.9 % (ref 12.3–15.4)
GLUCOSE BLDC GLUCOMTR-MCNC: 206 MG/DL (ref 70–130)
GLUCOSE BLDC GLUCOMTR-MCNC: 239 MG/DL (ref 70–130)
GLUCOSE BLDC GLUCOMTR-MCNC: 250 MG/DL (ref 70–130)
GLUCOSE BLDC GLUCOMTR-MCNC: 263 MG/DL (ref 70–130)
GLUCOSE BLDC GLUCOMTR-MCNC: 265 MG/DL (ref 70–130)
HCT VFR BLD AUTO: 29.3 % (ref 34–46.6)
HCT VFR BLD AUTO: 31.6 % (ref 34–46.6)
HGB BLD-MCNC: 8.8 G/DL (ref 12–15.9)
HGB BLD-MCNC: 9.2 G/DL (ref 12–15.9)
INR PPP: 1.64 (ref 0.85–1.16)
MAGNESIUM SERPL-MCNC: 2 MG/DL (ref 1.6–2.4)
MCH RBC QN AUTO: 29 PG (ref 26.6–33)
MCHC RBC AUTO-ENTMCNC: 30 G/DL (ref 31.5–35.7)
MCV RBC AUTO: 96.7 FL (ref 79–97)
PHOSPHATE SERPL-MCNC: 3.2 MG/DL (ref 2.5–4.5)
PLATELET # BLD AUTO: 242 10*3/MM3 (ref 140–450)
PMV BLD AUTO: 11.2 FL (ref 6–12)
PROTHROMBIN TIME: 19.1 SECONDS (ref 11.5–14)
RBC # BLD AUTO: 3.03 10*6/MM3 (ref 3.77–5.28)
WBC # BLD AUTO: 12.8 10*3/MM3 (ref 3.4–10.8)

## 2021-01-06 PROCEDURE — 83735 ASSAY OF MAGNESIUM: CPT | Performed by: NURSE PRACTITIONER

## 2021-01-06 PROCEDURE — 82962 GLUCOSE BLOOD TEST: CPT

## 2021-01-06 PROCEDURE — 92526 ORAL FUNCTION THERAPY: CPT

## 2021-01-06 PROCEDURE — 63710000001 INSULIN DETEMIR PER 5 UNITS: Performed by: INTERNAL MEDICINE

## 2021-01-06 PROCEDURE — 85027 COMPLETE CBC AUTOMATED: CPT | Performed by: NURSE PRACTITIONER

## 2021-01-06 PROCEDURE — 99233 SBSQ HOSP IP/OBS HIGH 50: CPT | Performed by: INTERNAL MEDICINE

## 2021-01-06 PROCEDURE — 85018 HEMOGLOBIN: CPT | Performed by: NURSE PRACTITIONER

## 2021-01-06 PROCEDURE — 94799 UNLISTED PULMONARY SVC/PX: CPT

## 2021-01-06 PROCEDURE — 85014 HEMATOCRIT: CPT | Performed by: NURSE PRACTITIONER

## 2021-01-06 PROCEDURE — 84100 ASSAY OF PHOSPHORUS: CPT | Performed by: INTERNAL MEDICINE

## 2021-01-06 PROCEDURE — 63710000001 INSULIN REGULAR HUMAN PER 5 UNITS: Performed by: INTERNAL MEDICINE

## 2021-01-06 PROCEDURE — 85610 PROTHROMBIN TIME: CPT | Performed by: INTERNAL MEDICINE

## 2021-01-06 RX ORDER — ALBUMIN (HUMAN) 12.5 G/50ML
12.5 SOLUTION INTRAVENOUS AS NEEDED
Status: COMPLETED | OUTPATIENT
Start: 2021-01-07 | End: 2021-01-09

## 2021-01-06 RX ADMIN — ACETAMINOPHEN 650 MG: 325 TABLET, FILM COATED ORAL at 20:03

## 2021-01-06 RX ADMIN — SODIUM CHLORIDE, PRESERVATIVE FREE 10 ML: 5 INJECTION INTRAVENOUS at 20:02

## 2021-01-06 RX ADMIN — IPRATROPIUM BROMIDE 2 PUFF: 17 AEROSOL, METERED RESPIRATORY (INHALATION) at 15:45

## 2021-01-06 RX ADMIN — ALBUTEROL SULFATE 2 PUFF: 90 AEROSOL, METERED RESPIRATORY (INHALATION) at 12:07

## 2021-01-06 RX ADMIN — INSULIN HUMAN 5 UNITS: 100 INJECTION, SOLUTION PARENTERAL at 17:29

## 2021-01-06 RX ADMIN — CARVEDILOL 25 MG: 12.5 TABLET, FILM COATED ORAL at 08:35

## 2021-01-06 RX ADMIN — RISPERIDONE 1 MG: 1 SOLUTION ORAL at 20:03

## 2021-01-06 RX ADMIN — INSULIN DETEMIR 20 UNITS: 100 INJECTION, SOLUTION SUBCUTANEOUS at 10:55

## 2021-01-06 RX ADMIN — PANTOPRAZOLE SODIUM 40 MG: 40 INJECTION, POWDER, FOR SOLUTION INTRAVENOUS at 05:16

## 2021-01-06 RX ADMIN — INSULIN HUMAN 8 UNITS: 100 INJECTION, SOLUTION PARENTERAL at 11:52

## 2021-01-06 RX ADMIN — ALBUTEROL SULFATE 2 PUFF: 90 AEROSOL, METERED RESPIRATORY (INHALATION) at 07:57

## 2021-01-06 RX ADMIN — DOCUSATE SODIUM 100 MG: 50 LIQUID ORAL at 20:05

## 2021-01-06 RX ADMIN — SODIUM CHLORIDE 250 ML: 9 INJECTION, SOLUTION INTRAVENOUS at 22:16

## 2021-01-06 RX ADMIN — ATORVASTATIN CALCIUM 80 MG: 40 TABLET, FILM COATED ORAL at 20:03

## 2021-01-06 RX ADMIN — TERAZOSIN HYDROCHLORIDE 1 MG: 1 CAPSULE ORAL at 20:02

## 2021-01-06 RX ADMIN — DOCUSATE SODIUM 100 MG: 50 LIQUID ORAL at 08:35

## 2021-01-06 RX ADMIN — INSULIN HUMAN 8 UNITS: 100 INJECTION, SOLUTION PARENTERAL at 05:21

## 2021-01-06 RX ADMIN — Medication 30 ML: at 20:02

## 2021-01-06 RX ADMIN — SODIUM CHLORIDE, PRESERVATIVE FREE 10 ML: 5 INJECTION INTRAVENOUS at 08:36

## 2021-01-06 RX ADMIN — IPRATROPIUM BROMIDE 2 PUFF: 17 AEROSOL, METERED RESPIRATORY (INHALATION) at 07:57

## 2021-01-06 RX ADMIN — Medication 30 ML: at 10:56

## 2021-01-06 RX ADMIN — SERTRALINE HYDROCHLORIDE 50 MG: 50 TABLET ORAL at 08:35

## 2021-01-06 RX ADMIN — SENNOSIDES 5 ML: 8.8 LIQUID ORAL at 08:35

## 2021-01-06 RX ADMIN — CARVEDILOL 25 MG: 12.5 TABLET, FILM COATED ORAL at 17:26

## 2021-01-06 RX ADMIN — SENNOSIDES 10 ML: 8.8 LIQUID ORAL at 20:02

## 2021-01-06 RX ADMIN — WARFARIN SODIUM 6 MG: 3 TABLET ORAL at 17:29

## 2021-01-06 RX ADMIN — ASPIRIN 81 MG CHEWABLE TABLET 81 MG: 81 TABLET CHEWABLE at 08:36

## 2021-01-06 RX ADMIN — IPRATROPIUM BROMIDE 2 PUFF: 17 AEROSOL, METERED RESPIRATORY (INHALATION) at 12:07

## 2021-01-06 RX ADMIN — ALBUTEROL SULFATE 2 PUFF: 90 AEROSOL, METERED RESPIRATORY (INHALATION) at 15:45

## 2021-01-06 RX ADMIN — INSULIN HUMAN 8 UNITS: 100 INJECTION, SOLUTION PARENTERAL at 00:06

## 2021-01-06 RX ADMIN — POTASSIUM & SODIUM PHOSPHATES POWDER PACK 280-160-250 MG 1 PACKET: 280-160-250 PACK at 08:35

## 2021-01-06 RX ADMIN — Medication 30 ML: at 17:30

## 2021-01-07 ENCOUNTER — APPOINTMENT (OUTPATIENT)
Dept: NEPHROLOGY | Facility: HOSPITAL | Age: 71
End: 2021-01-07

## 2021-01-07 ENCOUNTER — APPOINTMENT (OUTPATIENT)
Dept: GENERAL RADIOLOGY | Facility: HOSPITAL | Age: 71
End: 2021-01-07

## 2021-01-07 LAB
ANION GAP SERPL CALCULATED.3IONS-SCNC: 15 MMOL/L (ref 5–15)
ARTERIAL PATENCY WRIST A: ABNORMAL
ATMOSPHERIC PRESS: ABNORMAL MM[HG]
BASE EXCESS BLDA CALC-SCNC: 1.2 MMOL/L (ref 0–2)
BDY SITE: ABNORMAL
BODY TEMPERATURE: 37 C
BUN SERPL-MCNC: 60 MG/DL (ref 8–23)
BUN/CREAT SERPL: 16.3 (ref 7–25)
CALCIUM SPEC-SCNC: 9.1 MG/DL (ref 8.6–10.5)
CHLORIDE SERPL-SCNC: 95 MMOL/L (ref 98–107)
CO2 BLDA-SCNC: 29.3 MMOL/L (ref 22–33)
CO2 SERPL-SCNC: 23 MMOL/L (ref 22–29)
COHGB MFR BLD: 1.3 % (ref 0–2)
CREAT SERPL-MCNC: 3.67 MG/DL (ref 0.57–1)
DEPRECATED RDW RBC AUTO: 58.7 FL (ref 37–54)
EPAP: 0
ERYTHROCYTE [DISTWIDTH] IN BLOOD BY AUTOMATED COUNT: 16 % (ref 12.3–15.4)
GFR SERPL CREATININE-BSD FRML MDRD: 15 ML/MIN/1.73
GLUCOSE BLDC GLUCOMTR-MCNC: 134 MG/DL (ref 70–130)
GLUCOSE BLDC GLUCOMTR-MCNC: 200 MG/DL (ref 70–130)
GLUCOSE BLDC GLUCOMTR-MCNC: 217 MG/DL (ref 70–130)
GLUCOSE SERPL-MCNC: 215 MG/DL (ref 65–99)
HCO3 BLDA-SCNC: 27.7 MMOL/L (ref 20–26)
HCT VFR BLD AUTO: 31.5 % (ref 34–46.6)
HCT VFR BLD CALC: 29.2 %
HGB BLD-MCNC: 9.5 G/DL (ref 12–15.9)
HGB BLDA-MCNC: 9.5 G/DL (ref 14–18)
INHALED O2 CONCENTRATION: 32 %
INR PPP: 1.65 (ref 0.85–1.16)
IPAP: 0
MCH RBC QN AUTO: 29.8 PG (ref 26.6–33)
MCHC RBC AUTO-ENTMCNC: 30.2 G/DL (ref 31.5–35.7)
MCV RBC AUTO: 98.7 FL (ref 79–97)
METHGB BLD QL: 0.4 % (ref 0–1.5)
MODALITY: ABNORMAL
NOTE: ABNORMAL
OXYHGB MFR BLDV: 84.7 % (ref 94–99)
PAW @ PEAK INSP FLOW SETTING VENT: 0 CMH2O
PCO2 BLDA: 52.5 MM HG (ref 35–45)
PCO2 TEMP ADJ BLD: 52.5 MM HG (ref 35–45)
PH BLDA: 7.33 PH UNITS (ref 7.35–7.45)
PH, TEMP CORRECTED: 7.33 PH UNITS
PLATELET # BLD AUTO: 259 10*3/MM3 (ref 140–450)
PMV BLD AUTO: 10.9 FL (ref 6–12)
PO2 BLDA: 52.2 MM HG (ref 83–108)
PO2 TEMP ADJ BLD: 52.2 MM HG (ref 83–108)
POTASSIUM SERPL-SCNC: 3 MMOL/L (ref 3.5–5.2)
PROTHROMBIN TIME: 19.2 SECONDS (ref 11.5–14)
RBC # BLD AUTO: 3.19 10*6/MM3 (ref 3.77–5.28)
SODIUM SERPL-SCNC: 133 MMOL/L (ref 136–145)
TOTAL RATE: 0 BREATHS/MINUTE
WBC # BLD AUTO: 12.4 10*3/MM3 (ref 3.4–10.8)

## 2021-01-07 PROCEDURE — 82375 ASSAY CARBOXYHB QUANT: CPT

## 2021-01-07 PROCEDURE — 82962 GLUCOSE BLOOD TEST: CPT

## 2021-01-07 PROCEDURE — 99233 SBSQ HOSP IP/OBS HIGH 50: CPT | Performed by: INTERNAL MEDICINE

## 2021-01-07 PROCEDURE — 63710000001 INSULIN REGULAR HUMAN PER 5 UNITS: Performed by: NURSE PRACTITIONER

## 2021-01-07 PROCEDURE — 25010000002 HEPARIN (PORCINE) PER 1000 UNITS: Performed by: NURSE PRACTITIONER

## 2021-01-07 PROCEDURE — 25010000002 ONDANSETRON PER 1 MG: Performed by: NURSE PRACTITIONER

## 2021-01-07 PROCEDURE — 94799 UNLISTED PULMONARY SVC/PX: CPT

## 2021-01-07 PROCEDURE — 85027 COMPLETE CBC AUTOMATED: CPT | Performed by: INTERNAL MEDICINE

## 2021-01-07 PROCEDURE — 94640 AIRWAY INHALATION TREATMENT: CPT

## 2021-01-07 PROCEDURE — 36600 WITHDRAWAL OF ARTERIAL BLOOD: CPT

## 2021-01-07 PROCEDURE — 83050 HGB METHEMOGLOBIN QUAN: CPT

## 2021-01-07 PROCEDURE — 94660 CPAP INITIATION&MGMT: CPT

## 2021-01-07 PROCEDURE — 63710000001 INSULIN DETEMIR PER 5 UNITS: Performed by: INTERNAL MEDICINE

## 2021-01-07 PROCEDURE — 82805 BLOOD GASES W/O2 SATURATION: CPT

## 2021-01-07 PROCEDURE — 85610 PROTHROMBIN TIME: CPT | Performed by: NURSE PRACTITIONER

## 2021-01-07 PROCEDURE — 74018 RADEX ABDOMEN 1 VIEW: CPT

## 2021-01-07 PROCEDURE — 80048 BASIC METABOLIC PNL TOTAL CA: CPT | Performed by: INTERNAL MEDICINE

## 2021-01-07 RX ORDER — GUAIFENESIN 600 MG/1
1200 TABLET, EXTENDED RELEASE ORAL EVERY 12 HOURS SCHEDULED
Status: DISCONTINUED | OUTPATIENT
Start: 2021-01-07 | End: 2021-01-11 | Stop reason: ALTCHOICE

## 2021-01-07 RX ORDER — IPRATROPIUM BROMIDE AND ALBUTEROL SULFATE 2.5; .5 MG/3ML; MG/3ML
3 SOLUTION RESPIRATORY (INHALATION)
Status: DISCONTINUED | OUTPATIENT
Start: 2021-01-07 | End: 2021-01-22

## 2021-01-07 RX ADMIN — IPRATROPIUM BROMIDE AND ALBUTEROL SULFATE 3 ML: 2.5; .5 SOLUTION RESPIRATORY (INHALATION) at 13:12

## 2021-01-07 RX ADMIN — SODIUM CHLORIDE, PRESERVATIVE FREE 10 ML: 5 INJECTION INTRAVENOUS at 11:24

## 2021-01-07 RX ADMIN — Medication 30 ML: at 11:28

## 2021-01-07 RX ADMIN — INSULIN HUMAN 5 UNITS: 100 INJECTION, SOLUTION PARENTERAL at 06:04

## 2021-01-07 RX ADMIN — ONDANSETRON 4 MG: 2 INJECTION INTRAMUSCULAR; INTRAVENOUS at 08:49

## 2021-01-07 RX ADMIN — IPRATROPIUM BROMIDE AND ALBUTEROL SULFATE 3 ML: 2.5; .5 SOLUTION RESPIRATORY (INHALATION) at 08:46

## 2021-01-07 RX ADMIN — INSULIN DETEMIR 20 UNITS: 100 INJECTION, SOLUTION SUBCUTANEOUS at 11:27

## 2021-01-07 RX ADMIN — IPRATROPIUM BROMIDE AND ALBUTEROL SULFATE 3 ML: 2.5; .5 SOLUTION RESPIRATORY (INHALATION) at 20:16

## 2021-01-07 RX ADMIN — PANTOPRAZOLE SODIUM 40 MG: 40 INJECTION, POWDER, FOR SOLUTION INTRAVENOUS at 06:04

## 2021-01-07 RX ADMIN — HEPARIN SODIUM 1800 UNITS: 1000 INJECTION INTRAVENOUS; SUBCUTANEOUS at 11:01

## 2021-01-07 RX ADMIN — IPRATROPIUM BROMIDE AND ALBUTEROL SULFATE 3 ML: 2.5; .5 SOLUTION RESPIRATORY (INHALATION) at 15:59

## 2021-01-07 RX ADMIN — INSULIN HUMAN 5 UNITS: 100 INJECTION, SOLUTION PARENTERAL at 12:54

## 2021-01-08 LAB
ALBUMIN SERPL-MCNC: 3.3 G/DL (ref 3.5–5.2)
ALBUMIN/GLOB SERPL: 0.8 G/DL
ALP SERPL-CCNC: 111 U/L (ref 39–117)
ALT SERPL W P-5'-P-CCNC: 9 U/L (ref 1–33)
ANION GAP SERPL CALCULATED.3IONS-SCNC: 12 MMOL/L (ref 5–15)
ARTERIAL PATENCY WRIST A: ABNORMAL
ARTERIAL PATENCY WRIST A: ABNORMAL
AST SERPL-CCNC: 15 U/L (ref 1–32)
ATMOSPHERIC PRESS: ABNORMAL MM[HG]
ATMOSPHERIC PRESS: ABNORMAL MM[HG]
BASE EXCESS BLDA CALC-SCNC: -1 MMOL/L (ref 0–2)
BASE EXCESS BLDA CALC-SCNC: 0 MMOL/L (ref 0–2)
BASOPHILS # BLD AUTO: 0.07 10*3/MM3 (ref 0–0.2)
BASOPHILS NFR BLD AUTO: 0.6 % (ref 0–1.5)
BDY SITE: ABNORMAL
BDY SITE: ABNORMAL
BILIRUB SERPL-MCNC: 0.4 MG/DL (ref 0–1.2)
BODY TEMPERATURE: 37 C
BODY TEMPERATURE: 37 C
BUN SERPL-MCNC: 42 MG/DL (ref 8–23)
BUN/CREAT SERPL: 14 (ref 7–25)
BURR CELLS BLD QL SMEAR: NORMAL
CALCIUM SPEC-SCNC: 9 MG/DL (ref 8.6–10.5)
CHLORIDE SERPL-SCNC: 97 MMOL/L (ref 98–107)
CO2 BLDA-SCNC: 28.3 MMOL/L (ref 22–33)
CO2 BLDA-SCNC: 30.3 MMOL/L (ref 22–33)
CO2 SERPL-SCNC: 24 MMOL/L (ref 22–29)
COHGB MFR BLD: 0.9 % (ref 0–2)
COHGB MFR BLD: 1 % (ref 0–2)
CREAT SERPL-MCNC: 3 MG/DL (ref 0.57–1)
DEPRECATED RDW RBC AUTO: 60.9 FL (ref 37–54)
EOSINOPHIL # BLD AUTO: 0.29 10*3/MM3 (ref 0–0.4)
EOSINOPHIL NFR BLD AUTO: 2.3 % (ref 0.3–6.2)
EPAP: 10
EPAP: 10
ERYTHROCYTE [DISTWIDTH] IN BLOOD BY AUTOMATED COUNT: 16.2 % (ref 12.3–15.4)
GFR SERPL CREATININE-BSD FRML MDRD: 19 ML/MIN/1.73
GLOBULIN UR ELPH-MCNC: 4.2 GM/DL
GLUCOSE BLDC GLUCOMTR-MCNC: 127 MG/DL (ref 70–130)
GLUCOSE BLDC GLUCOMTR-MCNC: 145 MG/DL (ref 70–130)
GLUCOSE BLDC GLUCOMTR-MCNC: 178 MG/DL (ref 70–130)
GLUCOSE BLDC GLUCOMTR-MCNC: 187 MG/DL (ref 70–130)
GLUCOSE SERPL-MCNC: 171 MG/DL (ref 65–99)
HCO3 BLDA-SCNC: 26.5 MMOL/L (ref 20–26)
HCO3 BLDA-SCNC: 28.2 MMOL/L (ref 20–26)
HCT VFR BLD AUTO: 32.3 % (ref 34–46.6)
HCT VFR BLD CALC: 27.5 %
HCT VFR BLD CALC: 27.7 %
HGB BLD-MCNC: 9.1 G/DL (ref 12–15.9)
HGB BLDA-MCNC: 9 G/DL (ref 14–18)
HGB BLDA-MCNC: 9 G/DL (ref 14–18)
IMM GRANULOCYTES # BLD AUTO: 0.26 10*3/MM3 (ref 0–0.05)
IMM GRANULOCYTES NFR BLD AUTO: 2.1 % (ref 0–0.5)
INHALED O2 CONCENTRATION: 30 %
INHALED O2 CONCENTRATION: 30 %
INR PPP: 1.85 (ref 0.85–1.16)
IPAP: 20
IPAP: 24
LYMPHOCYTES # BLD AUTO: 1.5 10*3/MM3 (ref 0.7–3.1)
LYMPHOCYTES NFR BLD AUTO: 12 % (ref 19.6–45.3)
MACROCYTES BLD QL SMEAR: NORMAL
MCH RBC QN AUTO: 28.7 PG (ref 26.6–33)
MCHC RBC AUTO-ENTMCNC: 28.2 G/DL (ref 31.5–35.7)
MCV RBC AUTO: 101.9 FL (ref 79–97)
METHGB BLD QL: 0 % (ref 0–1.5)
METHGB BLD QL: 0.5 % (ref 0–1.5)
MODALITY: ABNORMAL
MODALITY: ABNORMAL
MONOCYTES # BLD AUTO: 0.83 10*3/MM3 (ref 0.1–0.9)
MONOCYTES NFR BLD AUTO: 6.6 % (ref 5–12)
NEUTROPHILS NFR BLD AUTO: 76.4 % (ref 42.7–76)
NEUTROPHILS NFR BLD AUTO: 9.58 10*3/MM3 (ref 1.7–7)
NOTE: ABNORMAL
NOTE: ABNORMAL
NRBC BLD AUTO-RTO: 0.9 /100 WBC (ref 0–0.2)
OXYHGB MFR BLDV: 96.9 % (ref 94–99)
OXYHGB MFR BLDV: 97.3 % (ref 94–99)
PAW @ PEAK INSP FLOW SETTING VENT: 0 CMH2O
PAW @ PEAK INSP FLOW SETTING VENT: 0 CMH2O
PCO2 BLDA: 58.9 MM HG (ref 35–45)
PCO2 BLDA: 66.6 MM HG (ref 35–45)
PCO2 TEMP ADJ BLD: 58.9 MM HG (ref 35–45)
PCO2 TEMP ADJ BLD: 66.6 MM HG (ref 35–45)
PEEP RESPIRATORY: 1 CM[H2O]
PH BLDA: 7.24 PH UNITS (ref 7.35–7.45)
PH BLDA: 7.26 PH UNITS (ref 7.35–7.45)
PH, TEMP CORRECTED: 7.24 PH UNITS
PH, TEMP CORRECTED: 7.26 PH UNITS
PLAT MORPH BLD: NORMAL
PLATELET # BLD AUTO: 223 10*3/MM3 (ref 140–450)
PMV BLD AUTO: 11.5 FL (ref 6–12)
PO2 BLDA: 109 MM HG (ref 83–108)
PO2 BLDA: 112 MM HG (ref 83–108)
PO2 TEMP ADJ BLD: 109 MM HG (ref 83–108)
PO2 TEMP ADJ BLD: 112 MM HG (ref 83–108)
POTASSIUM SERPL-SCNC: 3.9 MMOL/L (ref 3.5–5.2)
PROT SERPL-MCNC: 7.5 G/DL (ref 6–8.5)
PROTHROMBIN TIME: 21 SECONDS (ref 11.5–14)
PSV: 14 CMH2O
RBC # BLD AUTO: 3.17 10*6/MM3 (ref 3.77–5.28)
SODIUM SERPL-SCNC: 133 MMOL/L (ref 136–145)
TOTAL RATE: 19 BREATHS/MINUTE
TOTAL RATE: 20 BREATHS/MINUTE
VENTILATOR MODE: ABNORMAL
VENTILATOR MODE: ABNORMAL
WBC # BLD AUTO: 12.53 10*3/MM3 (ref 3.4–10.8)
WBC MORPH BLD: NORMAL

## 2021-01-08 PROCEDURE — 36600 WITHDRAWAL OF ARTERIAL BLOOD: CPT

## 2021-01-08 PROCEDURE — 85610 PROTHROMBIN TIME: CPT | Performed by: NURSE PRACTITIONER

## 2021-01-08 PROCEDURE — 82375 ASSAY CARBOXYHB QUANT: CPT

## 2021-01-08 PROCEDURE — 94799 UNLISTED PULMONARY SVC/PX: CPT

## 2021-01-08 PROCEDURE — 97110 THERAPEUTIC EXERCISES: CPT

## 2021-01-08 PROCEDURE — 63710000001 INSULIN REGULAR HUMAN PER 5 UNITS: Performed by: NURSE PRACTITIONER

## 2021-01-08 PROCEDURE — 85007 BL SMEAR W/DIFF WBC COUNT: CPT | Performed by: INTERNAL MEDICINE

## 2021-01-08 PROCEDURE — 99233 SBSQ HOSP IP/OBS HIGH 50: CPT | Performed by: INTERNAL MEDICINE

## 2021-01-08 PROCEDURE — 82962 GLUCOSE BLOOD TEST: CPT

## 2021-01-08 PROCEDURE — 83050 HGB METHEMOGLOBIN QUAN: CPT

## 2021-01-08 PROCEDURE — 85025 COMPLETE CBC W/AUTO DIFF WBC: CPT | Performed by: INTERNAL MEDICINE

## 2021-01-08 PROCEDURE — 80053 COMPREHEN METABOLIC PANEL: CPT | Performed by: INTERNAL MEDICINE

## 2021-01-08 PROCEDURE — 25010000002 EPOETIN ALFA-EPBX 10000 UNIT/ML SOLUTION: Performed by: NURSE PRACTITIONER

## 2021-01-08 PROCEDURE — 94660 CPAP INITIATION&MGMT: CPT

## 2021-01-08 PROCEDURE — 82805 BLOOD GASES W/O2 SATURATION: CPT

## 2021-01-08 PROCEDURE — 63710000001 INSULIN DETEMIR PER 5 UNITS: Performed by: INTERNAL MEDICINE

## 2021-01-08 RX ORDER — WARFARIN SODIUM 5 MG/1
5 TABLET ORAL
Status: DISCONTINUED | OUTPATIENT
Start: 2021-01-08 | End: 2021-01-19

## 2021-01-08 RX ADMIN — Medication 30 ML: at 22:44

## 2021-01-08 RX ADMIN — SENNOSIDES 10 ML: 8.8 LIQUID ORAL at 08:32

## 2021-01-08 RX ADMIN — Medication 30 ML: at 00:34

## 2021-01-08 RX ADMIN — EPOETIN ALFA-EPBX 10000 UNITS: 10000 INJECTION, SOLUTION INTRAVENOUS; SUBCUTANEOUS at 09:35

## 2021-01-08 RX ADMIN — ASPIRIN 81 MG CHEWABLE TABLET 81 MG: 81 TABLET CHEWABLE at 08:32

## 2021-01-08 RX ADMIN — SENNOSIDES 10 ML: 8.8 LIQUID ORAL at 22:44

## 2021-01-08 RX ADMIN — CARVEDILOL 25 MG: 12.5 TABLET, FILM COATED ORAL at 08:32

## 2021-01-08 RX ADMIN — IPRATROPIUM BROMIDE AND ALBUTEROL SULFATE 3 ML: 2.5; .5 SOLUTION RESPIRATORY (INHALATION) at 06:29

## 2021-01-08 RX ADMIN — ATORVASTATIN CALCIUM 80 MG: 40 TABLET, FILM COATED ORAL at 00:31

## 2021-01-08 RX ADMIN — Medication 30 ML: at 09:35

## 2021-01-08 RX ADMIN — WARFARIN SODIUM 5 MG: 5 TABLET ORAL at 17:45

## 2021-01-08 RX ADMIN — SODIUM CHLORIDE, PRESERVATIVE FREE 10 ML: 5 INJECTION INTRAVENOUS at 00:34

## 2021-01-08 RX ADMIN — IPRATROPIUM BROMIDE AND ALBUTEROL SULFATE 3 ML: 2.5; .5 SOLUTION RESPIRATORY (INHALATION) at 11:28

## 2021-01-08 RX ADMIN — CARVEDILOL 25 MG: 12.5 TABLET, FILM COATED ORAL at 17:45

## 2021-01-08 RX ADMIN — TERAZOSIN HYDROCHLORIDE 1 MG: 1 CAPSULE ORAL at 00:33

## 2021-01-08 RX ADMIN — SENNOSIDES 10 ML: 8.8 LIQUID ORAL at 00:31

## 2021-01-08 RX ADMIN — GUAIFENESIN 1200 MG: 600 TABLET, EXTENDED RELEASE ORAL at 08:32

## 2021-01-08 RX ADMIN — IPRATROPIUM BROMIDE AND ALBUTEROL SULFATE 3 ML: 2.5; .5 SOLUTION RESPIRATORY (INHALATION) at 20:01

## 2021-01-08 RX ADMIN — SODIUM CHLORIDE, PRESERVATIVE FREE 10 ML: 5 INJECTION INTRAVENOUS at 22:44

## 2021-01-08 RX ADMIN — SODIUM CHLORIDE, PRESERVATIVE FREE 10 ML: 5 INJECTION INTRAVENOUS at 08:33

## 2021-01-08 RX ADMIN — DOCUSATE SODIUM 100 MG: 50 LIQUID ORAL at 22:43

## 2021-01-08 RX ADMIN — INSULIN DETEMIR 25 UNITS: 100 INJECTION, SOLUTION SUBCUTANEOUS at 08:33

## 2021-01-08 RX ADMIN — DOCUSATE SODIUM 100 MG: 50 LIQUID ORAL at 00:32

## 2021-01-08 RX ADMIN — TERAZOSIN HYDROCHLORIDE 1 MG: 1 CAPSULE ORAL at 22:43

## 2021-01-08 RX ADMIN — PANTOPRAZOLE SODIUM 40 MG: 40 INJECTION, POWDER, FOR SOLUTION INTRAVENOUS at 06:06

## 2021-01-08 RX ADMIN — INSULIN HUMAN 3 UNITS: 100 INJECTION, SOLUTION PARENTERAL at 06:11

## 2021-01-08 RX ADMIN — GUAIFENESIN 1200 MG: 600 TABLET, EXTENDED RELEASE ORAL at 00:32

## 2021-01-08 RX ADMIN — INSULIN HUMAN 3 UNITS: 100 INJECTION, SOLUTION PARENTERAL at 12:00

## 2021-01-08 RX ADMIN — Medication 30 ML: at 15:35

## 2021-01-08 RX ADMIN — RISPERIDONE 1 MG: 1 SOLUTION ORAL at 22:43

## 2021-01-08 RX ADMIN — ATORVASTATIN CALCIUM 80 MG: 40 TABLET, FILM COATED ORAL at 22:43

## 2021-01-08 RX ADMIN — RISPERIDONE 1 MG: 1 SOLUTION ORAL at 00:32

## 2021-01-08 RX ADMIN — GUAIFENESIN 1200 MG: 600 TABLET, EXTENDED RELEASE ORAL at 22:43

## 2021-01-08 RX ADMIN — SERTRALINE HYDROCHLORIDE 50 MG: 50 TABLET ORAL at 08:32

## 2021-01-08 RX ADMIN — DOCUSATE SODIUM 100 MG: 50 LIQUID ORAL at 08:32

## 2021-01-08 RX ADMIN — IPRATROPIUM BROMIDE AND ALBUTEROL SULFATE 3 ML: 2.5; .5 SOLUTION RESPIRATORY (INHALATION) at 16:40

## 2021-01-09 ENCOUNTER — APPOINTMENT (OUTPATIENT)
Dept: NEPHROLOGY | Facility: HOSPITAL | Age: 71
End: 2021-01-09

## 2021-01-09 ENCOUNTER — APPOINTMENT (OUTPATIENT)
Dept: GENERAL RADIOLOGY | Facility: HOSPITAL | Age: 71
End: 2021-01-09

## 2021-01-09 LAB
ANION GAP SERPL CALCULATED.3IONS-SCNC: 12 MMOL/L (ref 5–15)
BASOPHILS # BLD AUTO: 0.05 10*3/MM3 (ref 0–0.2)
BASOPHILS NFR BLD AUTO: 0.4 % (ref 0–1.5)
BUN SERPL-MCNC: 61 MG/DL (ref 8–23)
BUN/CREAT SERPL: 15.8 (ref 7–25)
CALCIUM SPEC-SCNC: 8.9 MG/DL (ref 8.6–10.5)
CHLORIDE SERPL-SCNC: 97 MMOL/L (ref 98–107)
CO2 SERPL-SCNC: 25 MMOL/L (ref 22–29)
CREAT SERPL-MCNC: 3.87 MG/DL (ref 0.57–1)
DEPRECATED RDW RBC AUTO: 61.6 FL (ref 37–54)
EOSINOPHIL # BLD AUTO: 0.33 10*3/MM3 (ref 0–0.4)
EOSINOPHIL NFR BLD AUTO: 2.6 % (ref 0.3–6.2)
ERYTHROCYTE [DISTWIDTH] IN BLOOD BY AUTOMATED COUNT: 16.8 % (ref 12.3–15.4)
GFR SERPL CREATININE-BSD FRML MDRD: 14 ML/MIN/1.73
GFR SERPL CREATININE-BSD FRML MDRD: ABNORMAL ML/MIN/{1.73_M2}
GLUCOSE BLDC GLUCOMTR-MCNC: 112 MG/DL (ref 70–130)
GLUCOSE BLDC GLUCOMTR-MCNC: 118 MG/DL (ref 70–130)
GLUCOSE BLDC GLUCOMTR-MCNC: 151 MG/DL (ref 70–130)
GLUCOSE BLDC GLUCOMTR-MCNC: 169 MG/DL (ref 70–130)
GLUCOSE SERPL-MCNC: 118 MG/DL (ref 65–99)
HCT VFR BLD AUTO: 28.5 % (ref 34–46.6)
HGB BLD-MCNC: 8.4 G/DL (ref 12–15.9)
IMM GRANULOCYTES # BLD AUTO: 0.24 10*3/MM3 (ref 0–0.05)
IMM GRANULOCYTES NFR BLD AUTO: 1.9 % (ref 0–0.5)
INR PPP: 2.04 (ref 0.85–1.16)
LYMPHOCYTES # BLD AUTO: 1.93 10*3/MM3 (ref 0.7–3.1)
LYMPHOCYTES NFR BLD AUTO: 15.4 % (ref 19.6–45.3)
MCH RBC QN AUTO: 30 PG (ref 26.6–33)
MCHC RBC AUTO-ENTMCNC: 29.5 G/DL (ref 31.5–35.7)
MCV RBC AUTO: 101.8 FL (ref 79–97)
MONOCYTES # BLD AUTO: 0.84 10*3/MM3 (ref 0.1–0.9)
MONOCYTES NFR BLD AUTO: 6.7 % (ref 5–12)
NEUTROPHILS NFR BLD AUTO: 73 % (ref 42.7–76)
NEUTROPHILS NFR BLD AUTO: 9.12 10*3/MM3 (ref 1.7–7)
NRBC BLD AUTO-RTO: 0.8 /100 WBC (ref 0–0.2)
PLATELET # BLD AUTO: 249 10*3/MM3 (ref 140–450)
PMV BLD AUTO: 11.2 FL (ref 6–12)
POTASSIUM SERPL-SCNC: 3.5 MMOL/L (ref 3.5–5.2)
PROTHROMBIN TIME: 22.7 SECONDS (ref 11.5–14)
RBC # BLD AUTO: 2.8 10*6/MM3 (ref 3.77–5.28)
SODIUM SERPL-SCNC: 134 MMOL/L (ref 136–145)
WBC # BLD AUTO: 12.51 10*3/MM3 (ref 3.4–10.8)

## 2021-01-09 PROCEDURE — 63710000001 INSULIN REGULAR HUMAN PER 5 UNITS: Performed by: NURSE PRACTITIONER

## 2021-01-09 PROCEDURE — 25010000002 HEPARIN (PORCINE) PER 1000 UNITS: Performed by: NURSE PRACTITIONER

## 2021-01-09 PROCEDURE — P9047 ALBUMIN (HUMAN), 25%, 50ML: HCPCS | Performed by: INTERNAL MEDICINE

## 2021-01-09 PROCEDURE — 63710000001 INSULIN DETEMIR PER 5 UNITS: Performed by: INTERNAL MEDICINE

## 2021-01-09 PROCEDURE — 25010000002 ALBUMIN HUMAN 25% PER 50 ML: Performed by: INTERNAL MEDICINE

## 2021-01-09 PROCEDURE — 82962 GLUCOSE BLOOD TEST: CPT

## 2021-01-09 PROCEDURE — 74018 RADEX ABDOMEN 1 VIEW: CPT

## 2021-01-09 PROCEDURE — 97110 THERAPEUTIC EXERCISES: CPT

## 2021-01-09 PROCEDURE — 80048 BASIC METABOLIC PNL TOTAL CA: CPT | Performed by: INTERNAL MEDICINE

## 2021-01-09 PROCEDURE — 94799 UNLISTED PULMONARY SVC/PX: CPT

## 2021-01-09 PROCEDURE — 85610 PROTHROMBIN TIME: CPT | Performed by: NURSE PRACTITIONER

## 2021-01-09 PROCEDURE — 94660 CPAP INITIATION&MGMT: CPT

## 2021-01-09 PROCEDURE — 85025 COMPLETE CBC W/AUTO DIFF WBC: CPT | Performed by: INTERNAL MEDICINE

## 2021-01-09 PROCEDURE — 99233 SBSQ HOSP IP/OBS HIGH 50: CPT | Performed by: INTERNAL MEDICINE

## 2021-01-09 RX ADMIN — ACETAMINOPHEN 650 MG: 325 TABLET, FILM COATED ORAL at 10:53

## 2021-01-09 RX ADMIN — CARVEDILOL 25 MG: 12.5 TABLET, FILM COATED ORAL at 12:26

## 2021-01-09 RX ADMIN — HEPARIN SODIUM 1800 UNITS: 1000 INJECTION INTRAVENOUS; SUBCUTANEOUS at 11:25

## 2021-01-09 RX ADMIN — ALBUMIN (HUMAN) 12.5 G: 12.5 SOLUTION INTRAVENOUS at 08:51

## 2021-01-09 RX ADMIN — ASPIRIN 81 MG CHEWABLE TABLET 81 MG: 81 TABLET CHEWABLE at 12:26

## 2021-01-09 RX ADMIN — IPRATROPIUM BROMIDE AND ALBUTEROL SULFATE 3 ML: 2.5; .5 SOLUTION RESPIRATORY (INHALATION) at 09:33

## 2021-01-09 RX ADMIN — PANTOPRAZOLE SODIUM 40 MG: 40 INJECTION, POWDER, FOR SOLUTION INTRAVENOUS at 06:11

## 2021-01-09 RX ADMIN — ALBUMIN (HUMAN) 12.5 G: 12.5 SOLUTION INTRAVENOUS at 08:52

## 2021-01-09 RX ADMIN — GUAIFENESIN 1200 MG: 600 TABLET, EXTENDED RELEASE ORAL at 12:27

## 2021-01-09 RX ADMIN — SERTRALINE HYDROCHLORIDE 50 MG: 50 TABLET ORAL at 12:27

## 2021-01-09 RX ADMIN — IPRATROPIUM BROMIDE AND ALBUTEROL SULFATE 3 ML: 2.5; .5 SOLUTION RESPIRATORY (INHALATION) at 13:36

## 2021-01-09 RX ADMIN — Medication 30 ML: at 12:27

## 2021-01-09 RX ADMIN — ALBUMIN (HUMAN) 12.5 G: 12.5 SOLUTION INTRAVENOUS at 08:55

## 2021-01-09 RX ADMIN — INSULIN DETEMIR 25 UNITS: 100 INJECTION, SOLUTION SUBCUTANEOUS at 12:28

## 2021-01-09 RX ADMIN — SODIUM CHLORIDE, PRESERVATIVE FREE 10 ML: 5 INJECTION INTRAVENOUS at 12:27

## 2021-01-09 RX ADMIN — INSULIN HUMAN 3 UNITS: 100 INJECTION, SOLUTION PARENTERAL at 00:20

## 2021-01-09 RX ADMIN — IPRATROPIUM BROMIDE AND ALBUTEROL SULFATE 3 ML: 2.5; .5 SOLUTION RESPIRATORY (INHALATION) at 20:42

## 2021-01-10 LAB
DEPRECATED RDW RBC AUTO: 61.2 FL (ref 37–54)
ERYTHROCYTE [DISTWIDTH] IN BLOOD BY AUTOMATED COUNT: 16.9 % (ref 12.3–15.4)
GLUCOSE BLDC GLUCOMTR-MCNC: 120 MG/DL (ref 70–130)
GLUCOSE BLDC GLUCOMTR-MCNC: 133 MG/DL (ref 70–130)
GLUCOSE BLDC GLUCOMTR-MCNC: 135 MG/DL (ref 70–130)
GLUCOSE BLDC GLUCOMTR-MCNC: 161 MG/DL (ref 70–130)
GLUCOSE BLDC GLUCOMTR-MCNC: 170 MG/DL (ref 70–130)
GLUCOSE BLDC GLUCOMTR-MCNC: 66 MG/DL (ref 70–130)
GLUCOSE BLDC GLUCOMTR-MCNC: 78 MG/DL (ref 70–130)
HCT VFR BLD AUTO: 29.3 % (ref 34–46.6)
HGB BLD-MCNC: 8.6 G/DL (ref 12–15.9)
INR PPP: 2.11 (ref 0.85–1.16)
MCH RBC QN AUTO: 30.1 PG (ref 26.6–33)
MCHC RBC AUTO-ENTMCNC: 29.4 G/DL (ref 31.5–35.7)
MCV RBC AUTO: 102.4 FL (ref 79–97)
PLATELET # BLD AUTO: 250 10*3/MM3 (ref 140–450)
PMV BLD AUTO: 10.8 FL (ref 6–12)
PROTHROMBIN TIME: 23.4 SECONDS (ref 11.5–14)
RBC # BLD AUTO: 2.86 10*6/MM3 (ref 3.77–5.28)
WBC # BLD AUTO: 10.53 10*3/MM3 (ref 3.4–10.8)

## 2021-01-10 PROCEDURE — 63710000001 INSULIN REGULAR HUMAN PER 5 UNITS: Performed by: NURSE PRACTITIONER

## 2021-01-10 PROCEDURE — 63710000001 INSULIN DETEMIR PER 5 UNITS: Performed by: INTERNAL MEDICINE

## 2021-01-10 PROCEDURE — 82962 GLUCOSE BLOOD TEST: CPT

## 2021-01-10 PROCEDURE — 94799 UNLISTED PULMONARY SVC/PX: CPT

## 2021-01-10 PROCEDURE — 85027 COMPLETE CBC AUTOMATED: CPT | Performed by: INTERNAL MEDICINE

## 2021-01-10 PROCEDURE — 94660 CPAP INITIATION&MGMT: CPT

## 2021-01-10 PROCEDURE — 99232 SBSQ HOSP IP/OBS MODERATE 35: CPT | Performed by: INTERNAL MEDICINE

## 2021-01-10 PROCEDURE — 85610 PROTHROMBIN TIME: CPT | Performed by: NURSE PRACTITIONER

## 2021-01-10 RX ORDER — TRAMADOL HYDROCHLORIDE 50 MG/1
50 TABLET ORAL EVERY 12 HOURS PRN
Status: DISCONTINUED | OUTPATIENT
Start: 2021-01-10 | End: 2021-01-10

## 2021-01-10 RX ORDER — ACETAMINOPHEN 160 MG/5ML
650 SOLUTION ORAL EVERY 6 HOURS PRN
Status: DISCONTINUED | OUTPATIENT
Start: 2021-01-10 | End: 2021-01-28

## 2021-01-10 RX ORDER — ACETAMINOPHEN 160 MG/5ML
500 SOLUTION ORAL 4 TIMES DAILY
Status: DISCONTINUED | OUTPATIENT
Start: 2021-01-10 | End: 2021-01-22

## 2021-01-10 RX ORDER — DEXTROSE MONOHYDRATE 25 G/50ML
25 INJECTION, SOLUTION INTRAVENOUS
Status: DISCONTINUED | OUTPATIENT
Start: 2021-01-10 | End: 2021-01-21 | Stop reason: SDUPTHER

## 2021-01-10 RX ORDER — LIDOCAINE 50 MG/G
1 PATCH TOPICAL
Status: DISCONTINUED | OUTPATIENT
Start: 2021-01-10 | End: 2021-01-11

## 2021-01-10 RX ORDER — NICOTINE POLACRILEX 4 MG
15 LOZENGE BUCCAL
Status: DISCONTINUED | OUTPATIENT
Start: 2021-01-10 | End: 2021-01-28

## 2021-01-10 RX ADMIN — SODIUM CHLORIDE, PRESERVATIVE FREE 10 ML: 5 INJECTION INTRAVENOUS at 22:05

## 2021-01-10 RX ADMIN — INSULIN HUMAN 3 UNITS: 100 INJECTION, SOLUTION PARENTERAL at 23:51

## 2021-01-10 RX ADMIN — IPRATROPIUM BROMIDE AND ALBUTEROL SULFATE 3 ML: 2.5; .5 SOLUTION RESPIRATORY (INHALATION) at 12:04

## 2021-01-10 RX ADMIN — IPRATROPIUM BROMIDE AND ALBUTEROL SULFATE 3 ML: 2.5; .5 SOLUTION RESPIRATORY (INHALATION) at 21:35

## 2021-01-10 RX ADMIN — ACETAMINOPHEN ORAL SOLUTION 500 MG: 650 SOLUTION ORAL at 22:08

## 2021-01-10 RX ADMIN — PANTOPRAZOLE SODIUM 40 MG: 40 INJECTION, POWDER, FOR SOLUTION INTRAVENOUS at 06:02

## 2021-01-10 RX ADMIN — Medication 30 ML: at 22:03

## 2021-01-10 RX ADMIN — Medication 30 ML: at 08:15

## 2021-01-10 RX ADMIN — RISPERIDONE 1 MG: 1 SOLUTION ORAL at 22:02

## 2021-01-10 RX ADMIN — INSULIN DETEMIR 25 UNITS: 100 INJECTION, SOLUTION SUBCUTANEOUS at 11:26

## 2021-01-10 RX ADMIN — SODIUM CHLORIDE, PRESERVATIVE FREE 10 ML: 5 INJECTION INTRAVENOUS at 08:14

## 2021-01-10 RX ADMIN — GUAIFENESIN 1200 MG: 600 TABLET, EXTENDED RELEASE ORAL at 08:14

## 2021-01-10 RX ADMIN — SERTRALINE HYDROCHLORIDE 50 MG: 50 TABLET ORAL at 08:14

## 2021-01-10 RX ADMIN — DEXTROSE MONOHYDRATE 25 G: 25 INJECTION, SOLUTION INTRAVENOUS at 05:40

## 2021-01-10 RX ADMIN — LIDOCAINE 1 PATCH: 50 PATCH CUTANEOUS at 14:44

## 2021-01-10 RX ADMIN — ASPIRIN 81 MG CHEWABLE TABLET 81 MG: 81 TABLET CHEWABLE at 08:14

## 2021-01-10 RX ADMIN — Medication 30 ML: at 17:12

## 2021-01-10 RX ADMIN — ACETAMINOPHEN ORAL SOLUTION 500 MG: 650 SOLUTION ORAL at 14:44

## 2021-01-10 RX ADMIN — CARVEDILOL 25 MG: 12.5 TABLET, FILM COATED ORAL at 08:14

## 2021-01-10 RX ADMIN — ACETAMINOPHEN 650 MG: 325 TABLET, FILM COATED ORAL at 11:26

## 2021-01-10 RX ADMIN — CARVEDILOL 25 MG: 12.5 TABLET, FILM COATED ORAL at 17:16

## 2021-01-10 RX ADMIN — IPRATROPIUM BROMIDE AND ALBUTEROL SULFATE 3 ML: 2.5; .5 SOLUTION RESPIRATORY (INHALATION) at 16:09

## 2021-01-10 RX ADMIN — TERAZOSIN HYDROCHLORIDE 1 MG: 1 CAPSULE ORAL at 22:03

## 2021-01-10 RX ADMIN — ATORVASTATIN CALCIUM 80 MG: 40 TABLET, FILM COATED ORAL at 22:02

## 2021-01-10 RX ADMIN — IPRATROPIUM BROMIDE AND ALBUTEROL SULFATE 3 ML: 2.5; .5 SOLUTION RESPIRATORY (INHALATION) at 07:41

## 2021-01-10 RX ADMIN — TRAMADOL HYDROCHLORIDE 50 MG: 50 TABLET, FILM COATED ORAL at 09:19

## 2021-01-10 RX ADMIN — GUAIFENESIN 1200 MG: 600 TABLET, EXTENDED RELEASE ORAL at 22:03

## 2021-01-10 RX ADMIN — WARFARIN SODIUM 5 MG: 5 TABLET ORAL at 17:17

## 2021-01-11 LAB
ALBUMIN SERPL-MCNC: 3.6 G/DL (ref 3.5–5.2)
ALP SERPL-CCNC: 116 U/L (ref 39–117)
ALT SERPL W P-5'-P-CCNC: 9 U/L (ref 1–33)
ANION GAP SERPL CALCULATED.3IONS-SCNC: 17 MMOL/L (ref 5–15)
AST SERPL-CCNC: 17 U/L (ref 1–32)
BILIRUB SERPL-MCNC: 0.4 MG/DL (ref 0–1.2)
BUN SERPL-MCNC: 69 MG/DL (ref 8–23)
CALCIUM SPEC-SCNC: 9.2 MG/DL (ref 8.6–10.5)
CHLORIDE SERPL-SCNC: 93 MMOL/L (ref 98–107)
CHOLEST SERPL-MCNC: 82 MG/DL (ref 0–200)
CO2 SERPL-SCNC: 20 MMOL/L (ref 22–29)
CREAT SERPL-MCNC: 4.14 MG/DL (ref 0.57–1)
CRP SERPL-MCNC: 5.22 MG/DL (ref 0–0.5)
GLUCOSE BLDC GLUCOMTR-MCNC: 142 MG/DL (ref 70–130)
GLUCOSE BLDC GLUCOMTR-MCNC: 161 MG/DL (ref 70–130)
GLUCOSE BLDC GLUCOMTR-MCNC: 187 MG/DL (ref 70–130)
GLUCOSE BLDC GLUCOMTR-MCNC: 99 MG/DL (ref 70–130)
GLUCOSE SERPL-MCNC: 91 MG/DL (ref 65–99)
INR PPP: 2.08 (ref 0.85–1.16)
MAGNESIUM SERPL-MCNC: 2 MG/DL (ref 1.6–2.4)
PHOSPHATE SERPL-MCNC: 3.6 MG/DL (ref 2.5–4.5)
POTASSIUM SERPL-SCNC: 4 MMOL/L (ref 3.5–5.2)
PREALB SERPL-MCNC: 25.3 MG/DL (ref 20–40)
PROT SERPL-MCNC: 7.6 G/DL (ref 6–8.5)
PROTHROMBIN TIME: 23 SECONDS (ref 11.5–14)
SODIUM SERPL-SCNC: 130 MMOL/L (ref 136–145)
TRIGL SERPL-MCNC: 119 MG/DL (ref 0–150)

## 2021-01-11 PROCEDURE — 97110 THERAPEUTIC EXERCISES: CPT

## 2021-01-11 PROCEDURE — 86140 C-REACTIVE PROTEIN: CPT | Performed by: NURSE PRACTITIONER

## 2021-01-11 PROCEDURE — 84134 ASSAY OF PREALBUMIN: CPT | Performed by: NURSE PRACTITIONER

## 2021-01-11 PROCEDURE — 82962 GLUCOSE BLOOD TEST: CPT

## 2021-01-11 PROCEDURE — 94660 CPAP INITIATION&MGMT: CPT

## 2021-01-11 PROCEDURE — 84100 ASSAY OF PHOSPHORUS: CPT | Performed by: NURSE PRACTITIONER

## 2021-01-11 PROCEDURE — 94799 UNLISTED PULMONARY SVC/PX: CPT

## 2021-01-11 PROCEDURE — 82465 ASSAY BLD/SERUM CHOLESTEROL: CPT | Performed by: NURSE PRACTITIONER

## 2021-01-11 PROCEDURE — 85610 PROTHROMBIN TIME: CPT | Performed by: NURSE PRACTITIONER

## 2021-01-11 PROCEDURE — 99233 SBSQ HOSP IP/OBS HIGH 50: CPT | Performed by: NURSE PRACTITIONER

## 2021-01-11 PROCEDURE — 63710000001 INSULIN REGULAR HUMAN PER 5 UNITS: Performed by: NURSE PRACTITIONER

## 2021-01-11 PROCEDURE — 83735 ASSAY OF MAGNESIUM: CPT | Performed by: NURSE PRACTITIONER

## 2021-01-11 PROCEDURE — 84478 ASSAY OF TRIGLYCERIDES: CPT | Performed by: NURSE PRACTITIONER

## 2021-01-11 PROCEDURE — 80053 COMPREHEN METABOLIC PANEL: CPT | Performed by: NURSE PRACTITIONER

## 2021-01-11 PROCEDURE — 92610 EVALUATE SWALLOWING FUNCTION: CPT

## 2021-01-11 RX ORDER — RISPERIDONE 1 MG/ML
1 SOLUTION ORAL NIGHTLY
Status: DISCONTINUED | OUTPATIENT
Start: 2021-01-11 | End: 2021-01-22

## 2021-01-11 RX ORDER — ATORVASTATIN CALCIUM 40 MG/1
80 TABLET, FILM COATED ORAL NIGHTLY
Status: DISCONTINUED | OUTPATIENT
Start: 2021-01-11 | End: 2021-01-28

## 2021-01-11 RX ORDER — LIDOCAINE 50 MG/G
1 PATCH TOPICAL
Status: DISCONTINUED | OUTPATIENT
Start: 2021-01-11 | End: 2021-01-12

## 2021-01-11 RX ORDER — CHOLECALCIFEROL (VITAMIN D3) 125 MCG
5 CAPSULE ORAL NIGHTLY
Status: DISCONTINUED | OUTPATIENT
Start: 2021-01-11 | End: 2021-01-22

## 2021-01-11 RX ORDER — ASPIRIN 81 MG/1
81 TABLET, CHEWABLE ORAL DAILY
Status: DISCONTINUED | OUTPATIENT
Start: 2021-01-11 | End: 2021-01-28

## 2021-01-11 RX ORDER — CARVEDILOL 12.5 MG/1
25 TABLET ORAL 2 TIMES DAILY WITH MEALS
Status: DISCONTINUED | OUTPATIENT
Start: 2021-01-11 | End: 2021-01-28

## 2021-01-11 RX ORDER — HYDROCODONE BITARTRATE AND ACETAMINOPHEN 5; 325 MG/1; MG/1
1 TABLET ORAL ONCE
Status: COMPLETED | OUTPATIENT
Start: 2021-01-11 | End: 2021-01-11

## 2021-01-11 RX ORDER — TERAZOSIN 1 MG/1
1 CAPSULE ORAL NIGHTLY
Status: DISCONTINUED | OUTPATIENT
Start: 2021-01-11 | End: 2021-01-28

## 2021-01-11 RX ADMIN — SODIUM CHLORIDE, PRESERVATIVE FREE 10 ML: 5 INJECTION INTRAVENOUS at 21:07

## 2021-01-11 RX ADMIN — IPRATROPIUM BROMIDE AND ALBUTEROL SULFATE 3 ML: 2.5; .5 SOLUTION RESPIRATORY (INHALATION) at 06:48

## 2021-01-11 RX ADMIN — GUAIFENESIN 400 MG: 100 SOLUTION ORAL at 18:30

## 2021-01-11 RX ADMIN — IPRATROPIUM BROMIDE AND ALBUTEROL SULFATE 3 ML: 2.5; .5 SOLUTION RESPIRATORY (INHALATION) at 11:42

## 2021-01-11 RX ADMIN — GUAIFENESIN 400 MG: 200 SOLUTION ORAL at 12:21

## 2021-01-11 RX ADMIN — TERAZOSIN HYDROCHLORIDE 1 MG: 1 CAPSULE ORAL at 21:06

## 2021-01-11 RX ADMIN — ACETAMINOPHEN ORAL SOLUTION 500 MG: 650 SOLUTION ORAL at 18:31

## 2021-01-11 RX ADMIN — ACETAMINOPHEN ORAL SOLUTION 500 MG: 650 SOLUTION ORAL at 21:23

## 2021-01-11 RX ADMIN — IPRATROPIUM BROMIDE AND ALBUTEROL SULFATE 3 ML: 2.5; .5 SOLUTION RESPIRATORY (INHALATION) at 15:17

## 2021-01-11 RX ADMIN — ATORVASTATIN CALCIUM 80 MG: 40 TABLET, FILM COATED ORAL at 21:06

## 2021-01-11 RX ADMIN — IPRATROPIUM BROMIDE AND ALBUTEROL SULFATE 3 ML: 2.5; .5 SOLUTION RESPIRATORY (INHALATION) at 19:27

## 2021-01-11 RX ADMIN — INSULIN HUMAN 3 UNITS: 100 INJECTION, SOLUTION PARENTERAL at 12:18

## 2021-01-11 RX ADMIN — CARVEDILOL 25 MG: 12.5 TABLET, FILM COATED ORAL at 18:31

## 2021-01-11 RX ADMIN — PANTOPRAZOLE SODIUM 40 MG: 40 INJECTION, POWDER, FOR SOLUTION INTRAVENOUS at 06:13

## 2021-01-11 RX ADMIN — Medication 5 MG: at 21:06

## 2021-01-11 RX ADMIN — GUAIFENESIN 400 MG: 200 SOLUTION ORAL at 08:33

## 2021-01-11 RX ADMIN — ASPIRIN 81 MG CHEWABLE TABLET 81 MG: 81 TABLET CHEWABLE at 08:36

## 2021-01-11 RX ADMIN — Medication 30 ML: at 16:41

## 2021-01-11 RX ADMIN — LIDOCAINE 1 PATCH: 50 PATCH CUTANEOUS at 21:04

## 2021-01-11 RX ADMIN — CARVEDILOL 25 MG: 12.5 TABLET, FILM COATED ORAL at 08:36

## 2021-01-11 RX ADMIN — SODIUM CHLORIDE, PRESERVATIVE FREE 10 ML: 5 INJECTION INTRAVENOUS at 08:38

## 2021-01-11 RX ADMIN — SERTRALINE 50 MG: 50 TABLET, FILM COATED ORAL at 08:37

## 2021-01-11 RX ADMIN — WARFARIN SODIUM 5 MG: 5 TABLET ORAL at 18:31

## 2021-01-11 RX ADMIN — Medication 30 ML: at 21:07

## 2021-01-11 RX ADMIN — ACETAMINOPHEN ORAL SOLUTION 500 MG: 650 SOLUTION ORAL at 08:33

## 2021-01-11 RX ADMIN — Medication 30 ML: at 08:32

## 2021-01-11 RX ADMIN — RISPERIDONE 1 MG: 1 SOLUTION ORAL at 21:04

## 2021-01-11 RX ADMIN — HYDROCODONE BITARTRATE AND ACETAMINOPHEN 1 TABLET: 5; 325 TABLET ORAL at 21:07

## 2021-01-12 ENCOUNTER — APPOINTMENT (OUTPATIENT)
Dept: NEPHROLOGY | Facility: HOSPITAL | Age: 71
End: 2021-01-12

## 2021-01-12 LAB
GLUCOSE BLDC GLUCOMTR-MCNC: 162 MG/DL (ref 70–130)
GLUCOSE BLDC GLUCOMTR-MCNC: 172 MG/DL (ref 70–130)
GLUCOSE BLDC GLUCOMTR-MCNC: 182 MG/DL (ref 70–130)
GLUCOSE BLDC GLUCOMTR-MCNC: 98 MG/DL (ref 70–130)
INR PPP: 2.24 (ref 0.85–1.16)
PROTHROMBIN TIME: 24.5 SECONDS (ref 11.5–14)

## 2021-01-12 PROCEDURE — 99233 SBSQ HOSP IP/OBS HIGH 50: CPT | Performed by: NURSE PRACTITIONER

## 2021-01-12 PROCEDURE — 92526 ORAL FUNCTION THERAPY: CPT

## 2021-01-12 PROCEDURE — 25010000002 HEPARIN (PORCINE) PER 1000 UNITS: Performed by: NURSE PRACTITIONER

## 2021-01-12 PROCEDURE — 63710000001 INSULIN DETEMIR PER 5 UNITS: Performed by: INTERNAL MEDICINE

## 2021-01-12 PROCEDURE — 94799 UNLISTED PULMONARY SVC/PX: CPT

## 2021-01-12 PROCEDURE — 85610 PROTHROMBIN TIME: CPT | Performed by: NURSE PRACTITIONER

## 2021-01-12 PROCEDURE — 25010000002 LORAZEPAM PER 2 MG: Performed by: FAMILY MEDICINE

## 2021-01-12 PROCEDURE — P9047 ALBUMIN (HUMAN), 25%, 50ML: HCPCS | Performed by: INTERNAL MEDICINE

## 2021-01-12 PROCEDURE — 25010000002 ALBUMIN HUMAN 25% PER 50 ML: Performed by: INTERNAL MEDICINE

## 2021-01-12 PROCEDURE — 82962 GLUCOSE BLOOD TEST: CPT

## 2021-01-12 PROCEDURE — 94660 CPAP INITIATION&MGMT: CPT

## 2021-01-12 PROCEDURE — 63710000001 INSULIN REGULAR HUMAN PER 5 UNITS: Performed by: NURSE PRACTITIONER

## 2021-01-12 RX ORDER — LORAZEPAM 2 MG/ML
0.25 INJECTION INTRAMUSCULAR ONCE
Status: COMPLETED | OUTPATIENT
Start: 2021-01-12 | End: 2021-01-12

## 2021-01-12 RX ORDER — ALBUMIN (HUMAN) 12.5 G/50ML
12.5 SOLUTION INTRAVENOUS AS NEEDED
Status: ACTIVE | OUTPATIENT
Start: 2021-01-12 | End: 2021-01-12

## 2021-01-12 RX ADMIN — ACETAMINOPHEN ORAL SOLUTION 500 MG: 650 SOLUTION ORAL at 20:47

## 2021-01-12 RX ADMIN — RISPERIDONE 1 MG: 1 SOLUTION ORAL at 21:19

## 2021-01-12 RX ADMIN — IPRATROPIUM BROMIDE AND ALBUTEROL SULFATE 3 ML: 2.5; .5 SOLUTION RESPIRATORY (INHALATION) at 19:15

## 2021-01-12 RX ADMIN — CARVEDILOL 25 MG: 12.5 TABLET, FILM COATED ORAL at 11:14

## 2021-01-12 RX ADMIN — DOCUSATE SODIUM 100 MG: 50 LIQUID ORAL at 11:15

## 2021-01-12 RX ADMIN — ALBUMIN HUMAN 25 G: 0.25 SOLUTION INTRAVENOUS at 09:04

## 2021-01-12 RX ADMIN — IPRATROPIUM BROMIDE AND ALBUTEROL SULFATE 3 ML: 2.5; .5 SOLUTION RESPIRATORY (INHALATION) at 16:23

## 2021-01-12 RX ADMIN — Medication 5 MG: at 20:47

## 2021-01-12 RX ADMIN — INSULIN HUMAN 3 UNITS: 100 INJECTION, SOLUTION PARENTERAL at 17:38

## 2021-01-12 RX ADMIN — ALBUMIN HUMAN 25 G: 0.25 SOLUTION INTRAVENOUS at 07:37

## 2021-01-12 RX ADMIN — GUAIFENESIN 400 MG: 100 SOLUTION ORAL at 00:57

## 2021-01-12 RX ADMIN — GUAIFENESIN 400 MG: 100 SOLUTION ORAL at 17:39

## 2021-01-12 RX ADMIN — SODIUM CHLORIDE, PRESERVATIVE FREE 10 ML: 5 INJECTION INTRAVENOUS at 11:16

## 2021-01-12 RX ADMIN — INSULIN DETEMIR 25 UNITS: 100 INJECTION, SOLUTION SUBCUTANEOUS at 11:17

## 2021-01-12 RX ADMIN — TERAZOSIN HYDROCHLORIDE 1 MG: 1 CAPSULE ORAL at 20:47

## 2021-01-12 RX ADMIN — GUAIFENESIN 400 MG: 100 SOLUTION ORAL at 11:15

## 2021-01-12 RX ADMIN — HEPARIN SODIUM 1800 UNITS: 1000 INJECTION INTRAVENOUS; SUBCUTANEOUS at 10:39

## 2021-01-12 RX ADMIN — ASPIRIN 81 MG CHEWABLE TABLET 81 MG: 81 TABLET CHEWABLE at 11:14

## 2021-01-12 RX ADMIN — IPRATROPIUM BROMIDE AND ALBUTEROL SULFATE 3 ML: 2.5; .5 SOLUTION RESPIRATORY (INHALATION) at 12:17

## 2021-01-12 RX ADMIN — SERTRALINE 50 MG: 50 TABLET, FILM COATED ORAL at 11:14

## 2021-01-12 RX ADMIN — LORAZEPAM 0.25 MG: 2 INJECTION INTRAMUSCULAR; INTRAVENOUS at 23:08

## 2021-01-12 RX ADMIN — ACETAMINOPHEN ORAL SOLUTION 500 MG: 650 SOLUTION ORAL at 17:39

## 2021-01-12 RX ADMIN — ATORVASTATIN CALCIUM 80 MG: 40 TABLET, FILM COATED ORAL at 20:47

## 2021-01-12 RX ADMIN — GUAIFENESIN 400 MG: 100 SOLUTION ORAL at 23:10

## 2021-01-12 RX ADMIN — MINERAL OIL 5 ML: 1000 LIQUID ORAL at 14:29

## 2021-01-12 RX ADMIN — Medication 30 ML: at 17:40

## 2021-01-12 RX ADMIN — CARVEDILOL 25 MG: 12.5 TABLET, FILM COATED ORAL at 17:38

## 2021-01-12 RX ADMIN — WARFARIN SODIUM 5 MG: 5 TABLET ORAL at 17:38

## 2021-01-12 RX ADMIN — Medication 30 ML: at 11:32

## 2021-01-12 RX ADMIN — INSULIN HUMAN 3 UNITS: 100 INJECTION, SOLUTION PARENTERAL at 23:10

## 2021-01-12 RX ADMIN — INSULIN HUMAN 3 UNITS: 100 INJECTION, SOLUTION PARENTERAL at 00:57

## 2021-01-12 RX ADMIN — SODIUM CHLORIDE, PRESERVATIVE FREE 10 ML: 5 INJECTION INTRAVENOUS at 20:47

## 2021-01-12 RX ADMIN — WHITE PETROLATUM: 1.75 OINTMENT TOPICAL at 20:48

## 2021-01-12 RX ADMIN — Medication 30 ML: at 20:49

## 2021-01-12 RX ADMIN — ACETAMINOPHEN ORAL SOLUTION 500 MG: 650 SOLUTION ORAL at 11:31

## 2021-01-12 RX ADMIN — INSULIN HUMAN 3 UNITS: 100 INJECTION, SOLUTION PARENTERAL at 06:05

## 2021-01-12 RX ADMIN — SENNOSIDES 10 ML: 8.8 LIQUID ORAL at 11:14

## 2021-01-12 RX ADMIN — MINERAL OIL: 1000 LIQUID ORAL at 20:48

## 2021-01-13 LAB
GLUCOSE BLDC GLUCOMTR-MCNC: 108 MG/DL (ref 70–130)
GLUCOSE BLDC GLUCOMTR-MCNC: 125 MG/DL (ref 70–130)
GLUCOSE BLDC GLUCOMTR-MCNC: 166 MG/DL (ref 70–130)
INR PPP: 2.43 (ref 0.85–1.16)
PROTHROMBIN TIME: 26.1 SECONDS (ref 11.5–14)

## 2021-01-13 PROCEDURE — 93005 ELECTROCARDIOGRAM TRACING: CPT | Performed by: FAMILY MEDICINE

## 2021-01-13 PROCEDURE — 94799 UNLISTED PULMONARY SVC/PX: CPT

## 2021-01-13 PROCEDURE — 94660 CPAP INITIATION&MGMT: CPT

## 2021-01-13 PROCEDURE — 82962 GLUCOSE BLOOD TEST: CPT

## 2021-01-13 PROCEDURE — 99232 SBSQ HOSP IP/OBS MODERATE 35: CPT | Performed by: INTERNAL MEDICINE

## 2021-01-13 PROCEDURE — 97110 THERAPEUTIC EXERCISES: CPT

## 2021-01-13 PROCEDURE — 63710000001 INSULIN DETEMIR PER 5 UNITS: Performed by: INTERNAL MEDICINE

## 2021-01-13 PROCEDURE — 63710000001 INSULIN REGULAR HUMAN PER 5 UNITS: Performed by: NURSE PRACTITIONER

## 2021-01-13 PROCEDURE — 97530 THERAPEUTIC ACTIVITIES: CPT

## 2021-01-13 PROCEDURE — 93010 ELECTROCARDIOGRAM REPORT: CPT | Performed by: INTERNAL MEDICINE

## 2021-01-13 PROCEDURE — 85610 PROTHROMBIN TIME: CPT | Performed by: NURSE PRACTITIONER

## 2021-01-13 RX ORDER — HYDROCODONE BITARTRATE AND ACETAMINOPHEN 5; 325 MG/1; MG/1
1 TABLET ORAL ONCE
Status: COMPLETED | OUTPATIENT
Start: 2021-01-13 | End: 2021-01-13

## 2021-01-13 RX ADMIN — Medication 5 MG: at 20:13

## 2021-01-13 RX ADMIN — MINERAL OIL: 1000 LIQUID ORAL at 20:13

## 2021-01-13 RX ADMIN — RISPERIDONE 1 MG: 1 SOLUTION ORAL at 20:13

## 2021-01-13 RX ADMIN — ACETAMINOPHEN ORAL SOLUTION 500 MG: 650 SOLUTION ORAL at 09:09

## 2021-01-13 RX ADMIN — GUAIFENESIN 400 MG: 100 SOLUTION ORAL at 17:57

## 2021-01-13 RX ADMIN — SODIUM CHLORIDE, PRESERVATIVE FREE 10 ML: 5 INJECTION INTRAVENOUS at 20:13

## 2021-01-13 RX ADMIN — PANTOPRAZOLE SODIUM 40 MG: 40 INJECTION, POWDER, FOR SOLUTION INTRAVENOUS at 06:05

## 2021-01-13 RX ADMIN — IPRATROPIUM BROMIDE AND ALBUTEROL SULFATE 3 ML: 2.5; .5 SOLUTION RESPIRATORY (INHALATION) at 11:56

## 2021-01-13 RX ADMIN — WHITE PETROLATUM 1 APPLICATION: 1.75 OINTMENT TOPICAL at 09:13

## 2021-01-13 RX ADMIN — INSULIN HUMAN 3 UNITS: 100 INJECTION, SOLUTION PARENTERAL at 12:33

## 2021-01-13 RX ADMIN — ACETAMINOPHEN ORAL SOLUTION 500 MG: 650 SOLUTION ORAL at 20:12

## 2021-01-13 RX ADMIN — Medication 30 ML: at 20:15

## 2021-01-13 RX ADMIN — WHITE PETROLATUM: 1.75 OINTMENT TOPICAL at 20:13

## 2021-01-13 RX ADMIN — ACETAMINOPHEN ORAL SOLUTION 500 MG: 650 SOLUTION ORAL at 12:33

## 2021-01-13 RX ADMIN — ASPIRIN 81 MG CHEWABLE TABLET 81 MG: 81 TABLET CHEWABLE at 09:09

## 2021-01-13 RX ADMIN — TERAZOSIN HYDROCHLORIDE 1 MG: 1 CAPSULE ORAL at 20:13

## 2021-01-13 RX ADMIN — MINERAL OIL: 1000 LIQUID ORAL at 13:13

## 2021-01-13 RX ADMIN — GUAIFENESIN 400 MG: 100 SOLUTION ORAL at 12:33

## 2021-01-13 RX ADMIN — IPRATROPIUM BROMIDE AND ALBUTEROL SULFATE 3 ML: 2.5; .5 SOLUTION RESPIRATORY (INHALATION) at 16:00

## 2021-01-13 RX ADMIN — INSULIN DETEMIR 25 UNITS: 100 INJECTION, SOLUTION SUBCUTANEOUS at 09:11

## 2021-01-13 RX ADMIN — CARVEDILOL 25 MG: 12.5 TABLET, FILM COATED ORAL at 09:08

## 2021-01-13 RX ADMIN — WHITE PETROLATUM: 1.75 OINTMENT TOPICAL at 18:42

## 2021-01-13 RX ADMIN — IPRATROPIUM BROMIDE AND ALBUTEROL SULFATE 3 ML: 2.5; .5 SOLUTION RESPIRATORY (INHALATION) at 21:12

## 2021-01-13 RX ADMIN — SERTRALINE 50 MG: 50 TABLET, FILM COATED ORAL at 09:09

## 2021-01-13 RX ADMIN — MINERAL OIL: 1000 LIQUID ORAL at 16:00

## 2021-01-13 RX ADMIN — Medication 30 ML: at 09:12

## 2021-01-13 RX ADMIN — MINERAL OIL: 1000 LIQUID ORAL at 09:11

## 2021-01-13 RX ADMIN — IPRATROPIUM BROMIDE AND ALBUTEROL SULFATE 3 ML: 2.5; .5 SOLUTION RESPIRATORY (INHALATION) at 07:00

## 2021-01-13 RX ADMIN — GUAIFENESIN 400 MG: 100 SOLUTION ORAL at 06:03

## 2021-01-13 RX ADMIN — HYDROCODONE BITARTRATE AND ACETAMINOPHEN 1 TABLET: 5; 325 TABLET ORAL at 20:13

## 2021-01-13 RX ADMIN — SODIUM CHLORIDE, PRESERVATIVE FREE 10 ML: 5 INJECTION INTRAVENOUS at 09:13

## 2021-01-13 RX ADMIN — Medication 30 ML: at 16:00

## 2021-01-13 RX ADMIN — WARFARIN SODIUM 5 MG: 5 TABLET ORAL at 17:57

## 2021-01-13 RX ADMIN — ATORVASTATIN CALCIUM 80 MG: 40 TABLET, FILM COATED ORAL at 20:13

## 2021-01-13 RX ADMIN — CARVEDILOL 25 MG: 12.5 TABLET, FILM COATED ORAL at 17:57

## 2021-01-14 ENCOUNTER — APPOINTMENT (OUTPATIENT)
Dept: NEPHROLOGY | Facility: HOSPITAL | Age: 71
End: 2021-01-14

## 2021-01-14 LAB
ALBUMIN SERPL-MCNC: 3.7 G/DL (ref 3.5–5.2)
ANION GAP SERPL CALCULATED.3IONS-SCNC: 17 MMOL/L (ref 5–15)
BUN SERPL-MCNC: 88 MG/DL (ref 8–23)
BUN/CREAT SERPL: 20.7 (ref 7–25)
CALCIUM SPEC-SCNC: 9.3 MG/DL (ref 8.6–10.5)
CHLORIDE SERPL-SCNC: 91 MMOL/L (ref 98–107)
CO2 SERPL-SCNC: 21 MMOL/L (ref 22–29)
CREAT SERPL-MCNC: 4.25 MG/DL (ref 0.57–1)
DEPRECATED RDW RBC AUTO: 61.4 FL (ref 37–54)
ERYTHROCYTE [DISTWIDTH] IN BLOOD BY AUTOMATED COUNT: 17.1 % (ref 12.3–15.4)
GFR SERPL CREATININE-BSD FRML MDRD: 13 ML/MIN/1.73
GFR SERPL CREATININE-BSD FRML MDRD: ABNORMAL ML/MIN/{1.73_M2}
GLUCOSE BLDC GLUCOMTR-MCNC: 132 MG/DL (ref 70–130)
GLUCOSE BLDC GLUCOMTR-MCNC: 140 MG/DL (ref 70–130)
GLUCOSE BLDC GLUCOMTR-MCNC: 164 MG/DL (ref 70–130)
GLUCOSE BLDC GLUCOMTR-MCNC: 93 MG/DL (ref 70–130)
GLUCOSE SERPL-MCNC: 142 MG/DL (ref 65–99)
HCT VFR BLD AUTO: 28.4 % (ref 34–46.6)
HGB BLD-MCNC: 8.5 G/DL (ref 12–15.9)
INR PPP: 2.4 (ref 0.85–1.16)
MCH RBC QN AUTO: 29.4 PG (ref 26.6–33)
MCHC RBC AUTO-ENTMCNC: 29.9 G/DL (ref 31.5–35.7)
MCV RBC AUTO: 98.3 FL (ref 79–97)
PHOSPHATE SERPL-MCNC: 3.5 MG/DL (ref 2.5–4.5)
PLATELET # BLD AUTO: 242 10*3/MM3 (ref 140–450)
PMV BLD AUTO: 11.3 FL (ref 6–12)
POTASSIUM SERPL-SCNC: 4.2 MMOL/L (ref 3.5–5.2)
PROTHROMBIN TIME: 25.8 SECONDS (ref 11.5–14)
QT INTERVAL: 456 MS
QTC INTERVAL: 462 MS
RBC # BLD AUTO: 2.89 10*6/MM3 (ref 3.77–5.28)
SODIUM SERPL-SCNC: 129 MMOL/L (ref 136–145)
WBC # BLD AUTO: 10.71 10*3/MM3 (ref 3.4–10.8)

## 2021-01-14 PROCEDURE — 82962 GLUCOSE BLOOD TEST: CPT

## 2021-01-14 PROCEDURE — 63710000001 INSULIN DETEMIR PER 5 UNITS: Performed by: INTERNAL MEDICINE

## 2021-01-14 PROCEDURE — 63710000001 INSULIN REGULAR HUMAN PER 5 UNITS: Performed by: NURSE PRACTITIONER

## 2021-01-14 PROCEDURE — 92610 EVALUATE SWALLOWING FUNCTION: CPT

## 2021-01-14 PROCEDURE — 85027 COMPLETE CBC AUTOMATED: CPT | Performed by: PHYSICIAN ASSISTANT

## 2021-01-14 PROCEDURE — 25010000002 HEPARIN (PORCINE) PER 1000 UNITS: Performed by: NURSE PRACTITIONER

## 2021-01-14 PROCEDURE — 99232 SBSQ HOSP IP/OBS MODERATE 35: CPT | Performed by: PHYSICIAN ASSISTANT

## 2021-01-14 PROCEDURE — P9047 ALBUMIN (HUMAN), 25%, 50ML: HCPCS | Performed by: INTERNAL MEDICINE

## 2021-01-14 PROCEDURE — 85610 PROTHROMBIN TIME: CPT | Performed by: NURSE PRACTITIONER

## 2021-01-14 PROCEDURE — 80069 RENAL FUNCTION PANEL: CPT | Performed by: PHYSICIAN ASSISTANT

## 2021-01-14 PROCEDURE — 25010000002 EPOETIN ALFA-EPBX 10000 UNIT/ML SOLUTION: Performed by: INTERNAL MEDICINE

## 2021-01-14 PROCEDURE — 25010000002 ALBUMIN HUMAN 25% PER 50 ML: Performed by: INTERNAL MEDICINE

## 2021-01-14 PROCEDURE — 94799 UNLISTED PULMONARY SVC/PX: CPT

## 2021-01-14 RX ORDER — ALBUMIN (HUMAN) 12.5 G/50ML
12.5 SOLUTION INTRAVENOUS AS NEEDED
Status: ACTIVE | OUTPATIENT
Start: 2021-01-14 | End: 2021-01-14

## 2021-01-14 RX ORDER — LANSOPRAZOLE
30 KIT
Status: DISCONTINUED | OUTPATIENT
Start: 2021-01-14 | End: 2021-01-28

## 2021-01-14 RX ADMIN — SODIUM CHLORIDE, PRESERVATIVE FREE 10 ML: 5 INJECTION INTRAVENOUS at 20:55

## 2021-01-14 RX ADMIN — ACETAMINOPHEN ORAL SOLUTION 650 MG: 650 SOLUTION ORAL at 08:07

## 2021-01-14 RX ADMIN — Medication 5 MG: at 20:53

## 2021-01-14 RX ADMIN — CARVEDILOL 25 MG: 12.5 TABLET, FILM COATED ORAL at 12:13

## 2021-01-14 RX ADMIN — SENNOSIDES 10 ML: 8.8 LIQUID ORAL at 12:24

## 2021-01-14 RX ADMIN — IPRATROPIUM BROMIDE AND ALBUTEROL SULFATE 3 ML: 2.5; .5 SOLUTION RESPIRATORY (INHALATION) at 07:26

## 2021-01-14 RX ADMIN — ALBUMIN HUMAN 25 G: 0.25 SOLUTION INTRAVENOUS at 09:37

## 2021-01-14 RX ADMIN — ACETAMINOPHEN ORAL SOLUTION 500 MG: 650 SOLUTION ORAL at 18:26

## 2021-01-14 RX ADMIN — Medication 30 ML: at 16:28

## 2021-01-14 RX ADMIN — INSULIN HUMAN 3 UNITS: 100 INJECTION, SOLUTION PARENTERAL at 18:26

## 2021-01-14 RX ADMIN — EPOETIN ALFA-EPBX 10000 UNITS: 10000 INJECTION, SOLUTION INTRAVENOUS; SUBCUTANEOUS at 11:17

## 2021-01-14 RX ADMIN — DOCUSATE SODIUM 100 MG: 50 LIQUID ORAL at 20:53

## 2021-01-14 RX ADMIN — TERAZOSIN HYDROCHLORIDE 1 MG: 1 CAPSULE ORAL at 20:53

## 2021-01-14 RX ADMIN — HEPARIN SODIUM 1800 UNITS: 1000 INJECTION INTRAVENOUS; SUBCUTANEOUS at 11:16

## 2021-01-14 RX ADMIN — ASPIRIN 81 MG CHEWABLE TABLET 81 MG: 81 TABLET CHEWABLE at 12:13

## 2021-01-14 RX ADMIN — GUAIFENESIN 400 MG: 100 SOLUTION ORAL at 12:12

## 2021-01-14 RX ADMIN — MINERAL OIL: 1000 LIQUID ORAL at 16:30

## 2021-01-14 RX ADMIN — ALBUMIN HUMAN 25 G: 0.25 SOLUTION INTRAVENOUS at 08:06

## 2021-01-14 RX ADMIN — MINERAL OIL: 1000 LIQUID ORAL at 21:00

## 2021-01-14 RX ADMIN — WHITE PETROLATUM: 1.75 OINTMENT TOPICAL at 16:27

## 2021-01-14 RX ADMIN — MINERAL OIL: 1000 LIQUID ORAL at 12:12

## 2021-01-14 RX ADMIN — WARFARIN SODIUM 5 MG: 5 TABLET ORAL at 18:26

## 2021-01-14 RX ADMIN — SODIUM CHLORIDE, PRESERVATIVE FREE 10 ML: 5 INJECTION INTRAVENOUS at 12:13

## 2021-01-14 RX ADMIN — ACETAMINOPHEN ORAL SOLUTION 500 MG: 650 SOLUTION ORAL at 20:53

## 2021-01-14 RX ADMIN — INSULIN DETEMIR 25 UNITS: 100 INJECTION, SOLUTION SUBCUTANEOUS at 12:24

## 2021-01-14 RX ADMIN — CARVEDILOL 25 MG: 12.5 TABLET, FILM COATED ORAL at 18:26

## 2021-01-14 RX ADMIN — SERTRALINE 50 MG: 50 TABLET, FILM COATED ORAL at 12:13

## 2021-01-14 RX ADMIN — SENNOSIDES 10 ML: 8.8 LIQUID ORAL at 20:53

## 2021-01-14 RX ADMIN — WHITE PETROLATUM: 1.75 OINTMENT TOPICAL at 21:00

## 2021-01-14 RX ADMIN — GUAIFENESIN 400 MG: 100 SOLUTION ORAL at 18:25

## 2021-01-14 RX ADMIN — LANSOPRAZOLE 30 MG: KIT at 16:27

## 2021-01-14 RX ADMIN — WHITE PETROLATUM: 1.75 OINTMENT TOPICAL at 12:12

## 2021-01-14 RX ADMIN — IPRATROPIUM BROMIDE AND ALBUTEROL SULFATE 3 ML: 2.5; .5 SOLUTION RESPIRATORY (INHALATION) at 20:02

## 2021-01-14 RX ADMIN — ATORVASTATIN CALCIUM 80 MG: 40 TABLET, FILM COATED ORAL at 20:53

## 2021-01-14 RX ADMIN — DOCUSATE SODIUM 100 MG: 50 LIQUID ORAL at 12:13

## 2021-01-14 RX ADMIN — RISPERIDONE 1 MG: 1 SOLUTION ORAL at 20:54

## 2021-01-14 RX ADMIN — Medication 30 ML: at 12:18

## 2021-01-14 RX ADMIN — Medication 30 ML: at 20:55

## 2021-01-14 RX ADMIN — GUAIFENESIN 400 MG: 100 SOLUTION ORAL at 00:11

## 2021-01-15 ENCOUNTER — APPOINTMENT (OUTPATIENT)
Dept: GENERAL RADIOLOGY | Facility: HOSPITAL | Age: 71
End: 2021-01-15

## 2021-01-15 LAB
ALBUMIN SERPL-MCNC: 4.2 G/DL (ref 3.5–5.2)
ANION GAP SERPL CALCULATED.3IONS-SCNC: 15 MMOL/L (ref 5–15)
ARTERIAL PATENCY WRIST A: ABNORMAL
ATMOSPHERIC PRESS: ABNORMAL MM[HG]
BASE EXCESS BLDA CALC-SCNC: 0.1 MMOL/L (ref 0–2)
BDY SITE: ABNORMAL
BODY TEMPERATURE: 37 C
BUN SERPL-MCNC: 51 MG/DL (ref 8–23)
BUN/CREAT SERPL: 16.7 (ref 7–25)
CALCIUM SPEC-SCNC: 9.2 MG/DL (ref 8.6–10.5)
CHLORIDE SERPL-SCNC: 92 MMOL/L (ref 98–107)
CO2 BLDA-SCNC: 26.7 MMOL/L (ref 22–33)
CO2 SERPL-SCNC: 22 MMOL/L (ref 22–29)
COHGB MFR BLD: 1.4 % (ref 0–2)
CREAT SERPL-MCNC: 3.06 MG/DL (ref 0.57–1)
EPAP: 0
GFR SERPL CREATININE-BSD FRML MDRD: 18 ML/MIN/1.73
GLUCOSE BLDC GLUCOMTR-MCNC: 116 MG/DL (ref 70–130)
GLUCOSE BLDC GLUCOMTR-MCNC: 137 MG/DL (ref 70–130)
GLUCOSE BLDC GLUCOMTR-MCNC: 162 MG/DL (ref 70–130)
GLUCOSE BLDC GLUCOMTR-MCNC: 163 MG/DL (ref 70–130)
GLUCOSE SERPL-MCNC: 148 MG/DL (ref 65–99)
HCO3 BLDA-SCNC: 25.4 MMOL/L (ref 20–26)
HCT VFR BLD CALC: 25.9 %
HGB BLDA-MCNC: 8.4 G/DL (ref 14–18)
INHALED O2 CONCENTRATION: 21 %
INR PPP: 1.98 (ref 0.85–1.16)
IPAP: 0
METHGB BLD QL: ABNORMAL
MODALITY: ABNORMAL
NOTE: ABNORMAL
OXYHGB MFR BLDV: 93.1 % (ref 94–99)
PAW @ PEAK INSP FLOW SETTING VENT: 0 CMH2O
PCO2 BLDA: 43 MM HG (ref 35–45)
PCO2 TEMP ADJ BLD: 43 MM HG (ref 35–45)
PH BLDA: 7.38 PH UNITS (ref 7.35–7.45)
PH, TEMP CORRECTED: 7.38 PH UNITS
PHOSPHATE SERPL-MCNC: 2 MG/DL (ref 2.5–4.5)
PO2 BLDA: 64.5 MM HG (ref 83–108)
PO2 TEMP ADJ BLD: 64.5 MM HG (ref 83–108)
POTASSIUM SERPL-SCNC: 3.8 MMOL/L (ref 3.5–5.2)
PROTHROMBIN TIME: 22.2 SECONDS (ref 11.5–14)
SODIUM SERPL-SCNC: 129 MMOL/L (ref 136–145)
TOTAL RATE: 0 BREATHS/MINUTE

## 2021-01-15 PROCEDURE — 36600 WITHDRAWAL OF ARTERIAL BLOOD: CPT

## 2021-01-15 PROCEDURE — 63710000001 INSULIN DETEMIR PER 5 UNITS: Performed by: INTERNAL MEDICINE

## 2021-01-15 PROCEDURE — 94799 UNLISTED PULMONARY SVC/PX: CPT

## 2021-01-15 PROCEDURE — 82805 BLOOD GASES W/O2 SATURATION: CPT

## 2021-01-15 PROCEDURE — 99233 SBSQ HOSP IP/OBS HIGH 50: CPT | Performed by: NURSE PRACTITIONER

## 2021-01-15 PROCEDURE — 83050 HGB METHEMOGLOBIN QUAN: CPT

## 2021-01-15 PROCEDURE — 80069 RENAL FUNCTION PANEL: CPT | Performed by: INTERNAL MEDICINE

## 2021-01-15 PROCEDURE — 63710000001 INSULIN REGULAR HUMAN PER 5 UNITS: Performed by: NURSE PRACTITIONER

## 2021-01-15 PROCEDURE — 82375 ASSAY CARBOXYHB QUANT: CPT

## 2021-01-15 PROCEDURE — 82962 GLUCOSE BLOOD TEST: CPT

## 2021-01-15 PROCEDURE — 85610 PROTHROMBIN TIME: CPT | Performed by: NURSE PRACTITIONER

## 2021-01-15 RX ADMIN — IPRATROPIUM BROMIDE AND ALBUTEROL SULFATE 3 ML: 2.5; .5 SOLUTION RESPIRATORY (INHALATION) at 19:46

## 2021-01-15 RX ADMIN — Medication 30 ML: at 09:22

## 2021-01-15 RX ADMIN — LANSOPRAZOLE 30 MG: KIT at 05:10

## 2021-01-15 RX ADMIN — ACETAMINOPHEN ORAL SOLUTION 500 MG: 650 SOLUTION ORAL at 08:02

## 2021-01-15 RX ADMIN — ATORVASTATIN CALCIUM 80 MG: 40 TABLET, FILM COATED ORAL at 22:11

## 2021-01-15 RX ADMIN — Medication 30 ML: at 16:36

## 2021-01-15 RX ADMIN — Medication 5 MG: at 22:12

## 2021-01-15 RX ADMIN — INSULIN HUMAN 3 UNITS: 100 INJECTION, SOLUTION PARENTERAL at 05:55

## 2021-01-15 RX ADMIN — MINERAL OIL: 1000 LIQUID ORAL at 08:01

## 2021-01-15 RX ADMIN — RISPERIDONE 1 MG: 1 SOLUTION ORAL at 22:12

## 2021-01-15 RX ADMIN — TERAZOSIN HYDROCHLORIDE 1 MG: 1 CAPSULE ORAL at 22:11

## 2021-01-15 RX ADMIN — ASPIRIN 81 MG CHEWABLE TABLET 81 MG: 81 TABLET CHEWABLE at 08:01

## 2021-01-15 RX ADMIN — GUAIFENESIN 400 MG: 100 SOLUTION ORAL at 17:57

## 2021-01-15 RX ADMIN — GUAIFENESIN 400 MG: 100 SOLUTION ORAL at 00:41

## 2021-01-15 RX ADMIN — IPRATROPIUM BROMIDE AND ALBUTEROL SULFATE 3 ML: 2.5; .5 SOLUTION RESPIRATORY (INHALATION) at 17:12

## 2021-01-15 RX ADMIN — SODIUM CHLORIDE, PRESERVATIVE FREE 10 ML: 5 INJECTION INTRAVENOUS at 22:10

## 2021-01-15 RX ADMIN — INSULIN DETEMIR 25 UNITS: 100 INJECTION, SOLUTION SUBCUTANEOUS at 08:02

## 2021-01-15 RX ADMIN — ACETAMINOPHEN ORAL SOLUTION 500 MG: 650 SOLUTION ORAL at 17:57

## 2021-01-15 RX ADMIN — IPRATROPIUM BROMIDE AND ALBUTEROL SULFATE 3 ML: 2.5; .5 SOLUTION RESPIRATORY (INHALATION) at 12:41

## 2021-01-15 RX ADMIN — WHITE PETROLATUM: 1.75 OINTMENT TOPICAL at 22:13

## 2021-01-15 RX ADMIN — GUAIFENESIN 400 MG: 100 SOLUTION ORAL at 05:10

## 2021-01-15 RX ADMIN — ACETAMINOPHEN ORAL SOLUTION 500 MG: 650 SOLUTION ORAL at 22:10

## 2021-01-15 RX ADMIN — WHITE PETROLATUM: 1.75 OINTMENT TOPICAL at 08:01

## 2021-01-15 RX ADMIN — ACETAMINOPHEN ORAL SOLUTION 500 MG: 650 SOLUTION ORAL at 12:21

## 2021-01-15 RX ADMIN — CARVEDILOL 25 MG: 12.5 TABLET, FILM COATED ORAL at 17:57

## 2021-01-15 RX ADMIN — CARVEDILOL 25 MG: 12.5 TABLET, FILM COATED ORAL at 08:01

## 2021-01-15 RX ADMIN — MINERAL OIL: 1000 LIQUID ORAL at 22:13

## 2021-01-15 RX ADMIN — WARFARIN SODIUM 5 MG: 5 TABLET ORAL at 17:57

## 2021-01-15 RX ADMIN — SODIUM CHLORIDE, PRESERVATIVE FREE 10 ML: 5 INJECTION INTRAVENOUS at 08:02

## 2021-01-15 RX ADMIN — GUAIFENESIN 400 MG: 100 SOLUTION ORAL at 12:21

## 2021-01-15 RX ADMIN — GUAIFENESIN 400 MG: 100 SOLUTION ORAL at 22:11

## 2021-01-15 RX ADMIN — MINERAL OIL: 1000 LIQUID ORAL at 16:37

## 2021-01-15 RX ADMIN — WHITE PETROLATUM: 1.75 OINTMENT TOPICAL at 16:36

## 2021-01-15 RX ADMIN — IPRATROPIUM BROMIDE AND ALBUTEROL SULFATE 3 ML: 2.5; .5 SOLUTION RESPIRATORY (INHALATION) at 07:06

## 2021-01-15 RX ADMIN — SERTRALINE 50 MG: 50 TABLET, FILM COATED ORAL at 08:01

## 2021-01-15 RX ADMIN — Medication 30 ML: at 22:13

## 2021-01-15 RX ADMIN — INSULIN HUMAN 3 UNITS: 100 INJECTION, SOLUTION PARENTERAL at 11:47

## 2021-01-16 ENCOUNTER — APPOINTMENT (OUTPATIENT)
Dept: NEPHROLOGY | Facility: HOSPITAL | Age: 71
End: 2021-01-16

## 2021-01-16 LAB
GLUCOSE BLDC GLUCOMTR-MCNC: 124 MG/DL (ref 70–130)
GLUCOSE BLDC GLUCOMTR-MCNC: 148 MG/DL (ref 70–130)
GLUCOSE BLDC GLUCOMTR-MCNC: 160 MG/DL (ref 70–130)
GLUCOSE BLDC GLUCOMTR-MCNC: 165 MG/DL (ref 70–130)
INR PPP: 2.32 (ref 0.85–1.16)
PROTHROMBIN TIME: 25.2 SECONDS (ref 11.5–14)

## 2021-01-16 PROCEDURE — 94660 CPAP INITIATION&MGMT: CPT

## 2021-01-16 PROCEDURE — 94799 UNLISTED PULMONARY SVC/PX: CPT

## 2021-01-16 PROCEDURE — P9047 ALBUMIN (HUMAN), 25%, 50ML: HCPCS | Performed by: INTERNAL MEDICINE

## 2021-01-16 PROCEDURE — 82962 GLUCOSE BLOOD TEST: CPT

## 2021-01-16 PROCEDURE — 63710000001 INSULIN REGULAR HUMAN PER 5 UNITS: Performed by: NURSE PRACTITIONER

## 2021-01-16 PROCEDURE — 25010000002 EPOETIN ALFA-EPBX 10000 UNIT/ML SOLUTION: Performed by: INTERNAL MEDICINE

## 2021-01-16 PROCEDURE — 99232 SBSQ HOSP IP/OBS MODERATE 35: CPT | Performed by: NURSE PRACTITIONER

## 2021-01-16 PROCEDURE — 85610 PROTHROMBIN TIME: CPT | Performed by: NURSE PRACTITIONER

## 2021-01-16 PROCEDURE — 63710000001 INSULIN DETEMIR PER 5 UNITS: Performed by: INTERNAL MEDICINE

## 2021-01-16 PROCEDURE — 25010000002 ONDANSETRON PER 1 MG: Performed by: NURSE PRACTITIONER

## 2021-01-16 PROCEDURE — 25010000002 ALBUMIN HUMAN 25% PER 50 ML: Performed by: INTERNAL MEDICINE

## 2021-01-16 RX ORDER — ALBUMIN (HUMAN) 12.5 G/50ML
12.5 SOLUTION INTRAVENOUS AS NEEDED
Status: DISCONTINUED | OUTPATIENT
Start: 2021-01-16 | End: 2021-01-16

## 2021-01-16 RX ORDER — ALBUMIN (HUMAN) 12.5 G/50ML
25 SOLUTION INTRAVENOUS AS NEEDED
Status: ACTIVE | OUTPATIENT
Start: 2021-01-16 | End: 2021-01-16

## 2021-01-16 RX ADMIN — ACETAMINOPHEN ORAL SOLUTION 500 MG: 650 SOLUTION ORAL at 21:03

## 2021-01-16 RX ADMIN — GUAIFENESIN 400 MG: 100 SOLUTION ORAL at 06:54

## 2021-01-16 RX ADMIN — MINERAL OIL: 1000 LIQUID ORAL at 12:27

## 2021-01-16 RX ADMIN — Medication 30 ML: at 12:30

## 2021-01-16 RX ADMIN — GUAIFENESIN 400 MG: 100 SOLUTION ORAL at 17:03

## 2021-01-16 RX ADMIN — WHITE PETROLATUM: 1.75 OINTMENT TOPICAL at 17:02

## 2021-01-16 RX ADMIN — IPRATROPIUM BROMIDE AND ALBUTEROL SULFATE 3 ML: 2.5; .5 SOLUTION RESPIRATORY (INHALATION) at 07:10

## 2021-01-16 RX ADMIN — DOCUSATE SODIUM 100 MG: 50 LIQUID ORAL at 21:03

## 2021-01-16 RX ADMIN — Medication 5 MG: at 21:03

## 2021-01-16 RX ADMIN — INSULIN DETEMIR 25 UNITS: 100 INJECTION, SOLUTION SUBCUTANEOUS at 12:30

## 2021-01-16 RX ADMIN — ONDANSETRON 4 MG: 2 INJECTION INTRAMUSCULAR; INTRAVENOUS at 08:16

## 2021-01-16 RX ADMIN — ATORVASTATIN CALCIUM 80 MG: 40 TABLET, FILM COATED ORAL at 21:02

## 2021-01-16 RX ADMIN — EPOETIN ALFA-EPBX 10000 UNITS: 10000 INJECTION, SOLUTION INTRAVENOUS; SUBCUTANEOUS at 08:15

## 2021-01-16 RX ADMIN — WHITE PETROLATUM: 1.75 OINTMENT TOPICAL at 12:29

## 2021-01-16 RX ADMIN — IPRATROPIUM BROMIDE AND ALBUTEROL SULFATE 3 ML: 2.5; .5 SOLUTION RESPIRATORY (INHALATION) at 19:41

## 2021-01-16 RX ADMIN — LANSOPRAZOLE 30 MG: KIT at 06:54

## 2021-01-16 RX ADMIN — ASPIRIN 81 MG CHEWABLE TABLET 81 MG: 81 TABLET CHEWABLE at 12:28

## 2021-01-16 RX ADMIN — ACETAMINOPHEN ORAL SOLUTION 500 MG: 650 SOLUTION ORAL at 17:02

## 2021-01-16 RX ADMIN — Medication 30 ML: at 17:03

## 2021-01-16 RX ADMIN — INSULIN HUMAN 3 UNITS: 100 INJECTION, SOLUTION PARENTERAL at 00:00

## 2021-01-16 RX ADMIN — SERTRALINE 50 MG: 50 TABLET, FILM COATED ORAL at 12:30

## 2021-01-16 RX ADMIN — WARFARIN SODIUM 5 MG: 5 TABLET ORAL at 17:03

## 2021-01-16 RX ADMIN — WHITE PETROLATUM: 1.75 OINTMENT TOPICAL at 21:05

## 2021-01-16 RX ADMIN — IPRATROPIUM BROMIDE AND ALBUTEROL SULFATE 3 ML: 2.5; .5 SOLUTION RESPIRATORY (INHALATION) at 14:28

## 2021-01-16 RX ADMIN — Medication 30 ML: at 21:03

## 2021-01-16 RX ADMIN — ACETAMINOPHEN ORAL SOLUTION 500 MG: 650 SOLUTION ORAL at 12:28

## 2021-01-16 RX ADMIN — ALBUMIN HUMAN 25 G: 0.25 SOLUTION INTRAVENOUS at 07:51

## 2021-01-16 RX ADMIN — RISPERIDONE 1 MG: 1 SOLUTION ORAL at 21:05

## 2021-01-16 RX ADMIN — MINERAL OIL: 1000 LIQUID ORAL at 17:02

## 2021-01-16 RX ADMIN — TERAZOSIN HYDROCHLORIDE 1 MG: 1 CAPSULE ORAL at 21:03

## 2021-01-16 RX ADMIN — GUAIFENESIN 400 MG: 100 SOLUTION ORAL at 12:28

## 2021-01-16 RX ADMIN — ALBUMIN HUMAN 25 G: 0.25 SOLUTION INTRAVENOUS at 07:52

## 2021-01-17 ENCOUNTER — ANCILLARY PROCEDURE (OUTPATIENT)
Dept: SPEECH THERAPY | Facility: HOSPITAL | Age: 71
End: 2021-01-17

## 2021-01-17 LAB
ANION GAP SERPL CALCULATED.3IONS-SCNC: 14 MMOL/L (ref 5–15)
BUN SERPL-MCNC: 53 MG/DL (ref 8–23)
BUN/CREAT SERPL: 20.2 (ref 7–25)
CALCIUM SPEC-SCNC: 9.4 MG/DL (ref 8.6–10.5)
CHLORIDE SERPL-SCNC: 94 MMOL/L (ref 98–107)
CO2 SERPL-SCNC: 23 MMOL/L (ref 22–29)
CREAT SERPL-MCNC: 2.62 MG/DL (ref 0.57–1)
DEPRECATED RDW RBC AUTO: 65.7 FL (ref 37–54)
ERYTHROCYTE [DISTWIDTH] IN BLOOD BY AUTOMATED COUNT: 17.5 % (ref 12.3–15.4)
GFR SERPL CREATININE-BSD FRML MDRD: 22 ML/MIN/1.73
GLUCOSE BLDC GLUCOMTR-MCNC: 148 MG/DL (ref 70–130)
GLUCOSE BLDC GLUCOMTR-MCNC: 150 MG/DL (ref 70–130)
GLUCOSE BLDC GLUCOMTR-MCNC: 153 MG/DL (ref 70–130)
GLUCOSE BLDC GLUCOMTR-MCNC: 175 MG/DL (ref 70–130)
GLUCOSE SERPL-MCNC: 165 MG/DL (ref 65–99)
HCT VFR BLD AUTO: 31 % (ref 34–46.6)
HGB BLD-MCNC: 8.9 G/DL (ref 12–15.9)
INR PPP: 2.29 (ref 0.85–1.16)
MCH RBC QN AUTO: 29.8 PG (ref 26.6–33)
MCHC RBC AUTO-ENTMCNC: 28.7 G/DL (ref 31.5–35.7)
MCV RBC AUTO: 103.7 FL (ref 79–97)
PLATELET # BLD AUTO: 219 10*3/MM3 (ref 140–450)
PMV BLD AUTO: 10.9 FL (ref 6–12)
POTASSIUM SERPL-SCNC: 4 MMOL/L (ref 3.5–5.2)
PROTHROMBIN TIME: 24.9 SECONDS (ref 11.5–14)
RBC # BLD AUTO: 2.99 10*6/MM3 (ref 3.77–5.28)
SODIUM SERPL-SCNC: 131 MMOL/L (ref 136–145)
WBC # BLD AUTO: 11.82 10*3/MM3 (ref 3.4–10.8)

## 2021-01-17 PROCEDURE — 82962 GLUCOSE BLOOD TEST: CPT

## 2021-01-17 PROCEDURE — 94660 CPAP INITIATION&MGMT: CPT

## 2021-01-17 PROCEDURE — 92612 ENDOSCOPY SWALLOW (FEES) VID: CPT

## 2021-01-17 PROCEDURE — 99233 SBSQ HOSP IP/OBS HIGH 50: CPT | Performed by: INTERNAL MEDICINE

## 2021-01-17 PROCEDURE — 85610 PROTHROMBIN TIME: CPT | Performed by: NURSE PRACTITIONER

## 2021-01-17 PROCEDURE — 94799 UNLISTED PULMONARY SVC/PX: CPT

## 2021-01-17 PROCEDURE — 63710000001 INSULIN REGULAR HUMAN PER 5 UNITS: Performed by: NURSE PRACTITIONER

## 2021-01-17 PROCEDURE — 85027 COMPLETE CBC AUTOMATED: CPT | Performed by: NURSE PRACTITIONER

## 2021-01-17 PROCEDURE — 92610 EVALUATE SWALLOWING FUNCTION: CPT

## 2021-01-17 PROCEDURE — 63710000001 INSULIN DETEMIR PER 5 UNITS: Performed by: INTERNAL MEDICINE

## 2021-01-17 PROCEDURE — 80048 BASIC METABOLIC PNL TOTAL CA: CPT | Performed by: NURSE PRACTITIONER

## 2021-01-17 RX ADMIN — ACETAMINOPHEN ORAL SOLUTION 500 MG: 650 SOLUTION ORAL at 11:46

## 2021-01-17 RX ADMIN — IPRATROPIUM BROMIDE AND ALBUTEROL SULFATE 3 ML: 2.5; .5 SOLUTION RESPIRATORY (INHALATION) at 12:37

## 2021-01-17 RX ADMIN — ACETAMINOPHEN ORAL SOLUTION 650 MG: 650 SOLUTION ORAL at 23:40

## 2021-01-17 RX ADMIN — INSULIN DETEMIR 25 UNITS: 100 INJECTION, SOLUTION SUBCUTANEOUS at 09:35

## 2021-01-17 RX ADMIN — WHITE PETROLATUM: 1.75 OINTMENT TOPICAL at 08:05

## 2021-01-17 RX ADMIN — ACETAMINOPHEN ORAL SOLUTION 500 MG: 650 SOLUTION ORAL at 08:03

## 2021-01-17 RX ADMIN — TERAZOSIN HYDROCHLORIDE 1 MG: 1 CAPSULE ORAL at 20:39

## 2021-01-17 RX ADMIN — MINERAL OIL: 1000 LIQUID ORAL at 16:23

## 2021-01-17 RX ADMIN — GUAIFENESIN 400 MG: 100 SOLUTION ORAL at 18:15

## 2021-01-17 RX ADMIN — Medication 5 MG: at 20:39

## 2021-01-17 RX ADMIN — IPRATROPIUM BROMIDE AND ALBUTEROL SULFATE 3 ML: 2.5; .5 SOLUTION RESPIRATORY (INHALATION) at 16:30

## 2021-01-17 RX ADMIN — ACETAMINOPHEN ORAL SOLUTION 500 MG: 650 SOLUTION ORAL at 20:40

## 2021-01-17 RX ADMIN — GUAIFENESIN 400 MG: 100 SOLUTION ORAL at 23:51

## 2021-01-17 RX ADMIN — MINERAL OIL: 1000 LIQUID ORAL at 20:40

## 2021-01-17 RX ADMIN — GUAIFENESIN 400 MG: 100 SOLUTION ORAL at 11:47

## 2021-01-17 RX ADMIN — WHITE PETROLATUM: 1.75 OINTMENT TOPICAL at 20:41

## 2021-01-17 RX ADMIN — Medication 30 ML: at 08:05

## 2021-01-17 RX ADMIN — RISPERIDONE 1 MG: 1 SOLUTION ORAL at 23:40

## 2021-01-17 RX ADMIN — IPRATROPIUM BROMIDE AND ALBUTEROL SULFATE 3 ML: 2.5; .5 SOLUTION RESPIRATORY (INHALATION) at 20:11

## 2021-01-17 RX ADMIN — DOCUSATE SODIUM 100 MG: 50 LIQUID ORAL at 08:03

## 2021-01-17 RX ADMIN — WHITE PETROLATUM: 1.75 OINTMENT TOPICAL at 16:23

## 2021-01-17 RX ADMIN — INSULIN HUMAN 3 UNITS: 100 INJECTION, SOLUTION PARENTERAL at 18:18

## 2021-01-17 RX ADMIN — ACETAMINOPHEN ORAL SOLUTION 500 MG: 650 SOLUTION ORAL at 18:15

## 2021-01-17 RX ADMIN — MINERAL OIL: 1000 LIQUID ORAL at 08:03

## 2021-01-17 RX ADMIN — WARFARIN SODIUM 5 MG: 5 TABLET ORAL at 18:15

## 2021-01-17 RX ADMIN — ASPIRIN 81 MG CHEWABLE TABLET 81 MG: 81 TABLET CHEWABLE at 08:05

## 2021-01-17 RX ADMIN — ATORVASTATIN CALCIUM 80 MG: 40 TABLET, FILM COATED ORAL at 20:39

## 2021-01-17 RX ADMIN — GUAIFENESIN 400 MG: 100 SOLUTION ORAL at 05:23

## 2021-01-17 RX ADMIN — Medication 30 ML: at 20:40

## 2021-01-17 RX ADMIN — Medication 30 ML: at 16:23

## 2021-01-17 RX ADMIN — SERTRALINE 50 MG: 50 TABLET, FILM COATED ORAL at 08:04

## 2021-01-17 RX ADMIN — LANSOPRAZOLE 30 MG: KIT at 07:51

## 2021-01-17 RX ADMIN — SENNOSIDES 10 ML: 8.8 LIQUID ORAL at 23:39

## 2021-01-17 RX ADMIN — IPRATROPIUM BROMIDE AND ALBUTEROL SULFATE 3 ML: 2.5; .5 SOLUTION RESPIRATORY (INHALATION) at 08:37

## 2021-01-18 ENCOUNTER — APPOINTMENT (OUTPATIENT)
Dept: NEPHROLOGY | Facility: HOSPITAL | Age: 71
End: 2021-01-18

## 2021-01-18 LAB
ALBUMIN SERPL-MCNC: 3.9 G/DL (ref 3.5–5.2)
ALP SERPL-CCNC: 155 U/L (ref 39–117)
ALT SERPL W P-5'-P-CCNC: 8 U/L (ref 1–33)
ANION GAP SERPL CALCULATED.3IONS-SCNC: 14 MMOL/L (ref 5–15)
AST SERPL-CCNC: 12 U/L (ref 1–32)
BILIRUB SERPL-MCNC: 0.3 MG/DL (ref 0–1.2)
BUN SERPL-MCNC: 80 MG/DL (ref 8–23)
CALCIUM SPEC-SCNC: 9 MG/DL (ref 8.6–10.5)
CHLORIDE SERPL-SCNC: 92 MMOL/L (ref 98–107)
CHOLEST SERPL-MCNC: 62 MG/DL (ref 0–200)
CO2 SERPL-SCNC: 24 MMOL/L (ref 22–29)
CREAT SERPL-MCNC: 3.77 MG/DL (ref 0.57–1)
CRP SERPL-MCNC: 4.86 MG/DL (ref 0–0.5)
DEPRECATED RDW RBC AUTO: 64.8 FL (ref 37–54)
ERYTHROCYTE [DISTWIDTH] IN BLOOD BY AUTOMATED COUNT: 17.3 % (ref 12.3–15.4)
GLUCOSE BLDC GLUCOMTR-MCNC: 104 MG/DL (ref 70–130)
GLUCOSE BLDC GLUCOMTR-MCNC: 141 MG/DL (ref 70–130)
GLUCOSE BLDC GLUCOMTR-MCNC: 151 MG/DL (ref 70–130)
GLUCOSE BLDC GLUCOMTR-MCNC: 156 MG/DL (ref 70–130)
GLUCOSE BLDC GLUCOMTR-MCNC: 171 MG/DL (ref 70–130)
GLUCOSE SERPL-MCNC: 143 MG/DL (ref 65–99)
HCT VFR BLD AUTO: 29.6 % (ref 34–46.6)
HGB BLD-MCNC: 8.5 G/DL (ref 12–15.9)
INR PPP: 2.58 (ref 0.85–1.16)
MAGNESIUM SERPL-MCNC: 2.1 MG/DL (ref 1.6–2.4)
MCH RBC QN AUTO: 29.5 PG (ref 26.6–33)
MCHC RBC AUTO-ENTMCNC: 28.7 G/DL (ref 31.5–35.7)
MCV RBC AUTO: 102.8 FL (ref 79–97)
PHOSPHATE SERPL-MCNC: 2.7 MG/DL (ref 2.5–4.5)
PLATELET # BLD AUTO: 233 10*3/MM3 (ref 140–450)
PMV BLD AUTO: 11.2 FL (ref 6–12)
POTASSIUM SERPL-SCNC: 3.6 MMOL/L (ref 3.5–5.2)
PREALB SERPL-MCNC: 24.8 MG/DL (ref 20–40)
PROT SERPL-MCNC: 7.5 G/DL (ref 6–8.5)
PROTHROMBIN TIME: 27.4 SECONDS (ref 11.5–14)
RBC # BLD AUTO: 2.88 10*6/MM3 (ref 3.77–5.28)
SODIUM SERPL-SCNC: 130 MMOL/L (ref 136–145)
TRIGL SERPL-MCNC: 119 MG/DL (ref 0–150)
WBC # BLD AUTO: 10.64 10*3/MM3 (ref 3.4–10.8)

## 2021-01-18 PROCEDURE — 63710000001 INSULIN REGULAR HUMAN PER 5 UNITS: Performed by: NURSE PRACTITIONER

## 2021-01-18 PROCEDURE — 84478 ASSAY OF TRIGLYCERIDES: CPT | Performed by: NURSE PRACTITIONER

## 2021-01-18 PROCEDURE — 86140 C-REACTIVE PROTEIN: CPT | Performed by: NURSE PRACTITIONER

## 2021-01-18 PROCEDURE — 82962 GLUCOSE BLOOD TEST: CPT

## 2021-01-18 PROCEDURE — 99232 SBSQ HOSP IP/OBS MODERATE 35: CPT | Performed by: INTERNAL MEDICINE

## 2021-01-18 PROCEDURE — 94799 UNLISTED PULMONARY SVC/PX: CPT

## 2021-01-18 PROCEDURE — 82465 ASSAY BLD/SERUM CHOLESTEROL: CPT | Performed by: NURSE PRACTITIONER

## 2021-01-18 PROCEDURE — 85610 PROTHROMBIN TIME: CPT | Performed by: NURSE PRACTITIONER

## 2021-01-18 PROCEDURE — 83735 ASSAY OF MAGNESIUM: CPT | Performed by: NURSE PRACTITIONER

## 2021-01-18 PROCEDURE — 80053 COMPREHEN METABOLIC PANEL: CPT | Performed by: NURSE PRACTITIONER

## 2021-01-18 PROCEDURE — 97110 THERAPEUTIC EXERCISES: CPT

## 2021-01-18 PROCEDURE — 84134 ASSAY OF PREALBUMIN: CPT | Performed by: NURSE PRACTITIONER

## 2021-01-18 PROCEDURE — 85027 COMPLETE CBC AUTOMATED: CPT | Performed by: INTERNAL MEDICINE

## 2021-01-18 PROCEDURE — 84100 ASSAY OF PHOSPHORUS: CPT | Performed by: NURSE PRACTITIONER

## 2021-01-18 PROCEDURE — 63710000001 INSULIN DETEMIR PER 5 UNITS: Performed by: INTERNAL MEDICINE

## 2021-01-18 PROCEDURE — 25010000002 EPOETIN ALFA-EPBX 10000 UNIT/ML SOLUTION: Performed by: INTERNAL MEDICINE

## 2021-01-18 PROCEDURE — 97530 THERAPEUTIC ACTIVITIES: CPT

## 2021-01-18 RX ORDER — TRAMADOL HYDROCHLORIDE 50 MG/1
25 TABLET ORAL ONCE
Status: COMPLETED | OUTPATIENT
Start: 2021-01-18 | End: 2021-01-18

## 2021-01-18 RX ADMIN — DOCUSATE SODIUM 100 MG: 50 LIQUID ORAL at 11:32

## 2021-01-18 RX ADMIN — Medication 30 ML: at 17:43

## 2021-01-18 RX ADMIN — LANSOPRAZOLE 30 MG: KIT at 11:40

## 2021-01-18 RX ADMIN — INSULIN DETEMIR 25 UNITS: 100 INJECTION, SOLUTION SUBCUTANEOUS at 11:42

## 2021-01-18 RX ADMIN — WHITE PETROLATUM: 1.75 OINTMENT TOPICAL at 11:41

## 2021-01-18 RX ADMIN — MINERAL OIL: 1000 LIQUID ORAL at 17:41

## 2021-01-18 RX ADMIN — MINERAL OIL: 1000 LIQUID ORAL at 11:37

## 2021-01-18 RX ADMIN — ACETAMINOPHEN ORAL SOLUTION 500 MG: 650 SOLUTION ORAL at 20:59

## 2021-01-18 RX ADMIN — Medication 30 ML: at 11:36

## 2021-01-18 RX ADMIN — EPOETIN ALFA-EPBX 10000 UNITS: 10000 INJECTION, SOLUTION INTRAVENOUS; SUBCUTANEOUS at 17:41

## 2021-01-18 RX ADMIN — Medication 5 MG: at 20:59

## 2021-01-18 RX ADMIN — Medication 30 ML: at 20:58

## 2021-01-18 RX ADMIN — IPRATROPIUM BROMIDE AND ALBUTEROL SULFATE 3 ML: 2.5; .5 SOLUTION RESPIRATORY (INHALATION) at 20:01

## 2021-01-18 RX ADMIN — IPRATROPIUM BROMIDE AND ALBUTEROL SULFATE 3 ML: 2.5; .5 SOLUTION RESPIRATORY (INHALATION) at 12:52

## 2021-01-18 RX ADMIN — TRAMADOL HYDROCHLORIDE 25 MG: 50 TABLET, FILM COATED ORAL at 23:17

## 2021-01-18 RX ADMIN — MINERAL OIL: 1000 LIQUID ORAL at 21:12

## 2021-01-18 RX ADMIN — WARFARIN SODIUM 5 MG: 5 TABLET ORAL at 17:42

## 2021-01-18 RX ADMIN — ACETAMINOPHEN ORAL SOLUTION 500 MG: 650 SOLUTION ORAL at 17:42

## 2021-01-18 RX ADMIN — ASPIRIN 81 MG CHEWABLE TABLET 81 MG: 81 TABLET CHEWABLE at 11:34

## 2021-01-18 RX ADMIN — TERAZOSIN HYDROCHLORIDE 1 MG: 1 CAPSULE ORAL at 20:59

## 2021-01-18 RX ADMIN — IPRATROPIUM BROMIDE AND ALBUTEROL SULFATE 3 ML: 2.5; .5 SOLUTION RESPIRATORY (INHALATION) at 16:46

## 2021-01-18 RX ADMIN — GUAIFENESIN 400 MG: 100 SOLUTION ORAL at 11:31

## 2021-01-18 RX ADMIN — ATORVASTATIN CALCIUM 80 MG: 40 TABLET, FILM COATED ORAL at 20:59

## 2021-01-18 RX ADMIN — RISPERIDONE 1 MG: 1 SOLUTION ORAL at 20:58

## 2021-01-18 RX ADMIN — SERTRALINE 50 MG: 50 TABLET, FILM COATED ORAL at 11:34

## 2021-01-18 RX ADMIN — ACETAMINOPHEN ORAL SOLUTION 640 MG: 650 SOLUTION ORAL at 09:32

## 2021-01-18 RX ADMIN — INSULIN HUMAN 3 UNITS: 100 INJECTION, SOLUTION PARENTERAL at 17:42

## 2021-01-18 RX ADMIN — CARVEDILOL 25 MG: 12.5 TABLET, FILM COATED ORAL at 11:35

## 2021-01-18 RX ADMIN — GUAIFENESIN 400 MG: 100 SOLUTION ORAL at 17:42

## 2021-01-18 RX ADMIN — GUAIFENESIN 400 MG: 100 SOLUTION ORAL at 23:17

## 2021-01-18 RX ADMIN — ACETAMINOPHEN ORAL SOLUTION 500 MG: 650 SOLUTION ORAL at 11:34

## 2021-01-18 RX ADMIN — WHITE PETROLATUM: 1.75 OINTMENT TOPICAL at 17:41

## 2021-01-18 RX ADMIN — SENNOSIDES 10 ML: 8.8 LIQUID ORAL at 11:34

## 2021-01-19 PROBLEM — R13.13 DYSPHAGIA, PHARYNGEAL PHASE: Status: ACTIVE | Noted: 2020-12-26

## 2021-01-19 LAB
GLUCOSE BLDC GLUCOMTR-MCNC: 111 MG/DL (ref 70–130)
GLUCOSE BLDC GLUCOMTR-MCNC: 132 MG/DL (ref 70–130)
GLUCOSE BLDC GLUCOMTR-MCNC: 134 MG/DL (ref 70–130)
GLUCOSE BLDC GLUCOMTR-MCNC: 138 MG/DL (ref 70–130)
GLUCOSE BLDC GLUCOMTR-MCNC: 151 MG/DL (ref 70–130)
GLUCOSE BLDC GLUCOMTR-MCNC: 75 MG/DL (ref 70–130)
INR PPP: 2.61 (ref 0.85–1.16)
PROTHROMBIN TIME: 27.6 SECONDS (ref 11.5–14)

## 2021-01-19 PROCEDURE — 94799 UNLISTED PULMONARY SVC/PX: CPT

## 2021-01-19 PROCEDURE — 63710000001 INSULIN REGULAR HUMAN PER 5 UNITS: Performed by: NURSE PRACTITIONER

## 2021-01-19 PROCEDURE — 99222 1ST HOSP IP/OBS MODERATE 55: CPT | Performed by: PHYSICIAN ASSISTANT

## 2021-01-19 PROCEDURE — 99233 SBSQ HOSP IP/OBS HIGH 50: CPT | Performed by: HOSPITALIST

## 2021-01-19 PROCEDURE — 63710000001 INSULIN DETEMIR PER 5 UNITS: Performed by: INTERNAL MEDICINE

## 2021-01-19 PROCEDURE — 82962 GLUCOSE BLOOD TEST: CPT

## 2021-01-19 PROCEDURE — 25010000002 VITAMIN K1 1 MG/1 ML SOLUTION: Performed by: PHYSICIAN ASSISTANT

## 2021-01-19 PROCEDURE — 85610 PROTHROMBIN TIME: CPT | Performed by: NURSE PRACTITIONER

## 2021-01-19 RX ORDER — ALBUMIN (HUMAN) 12.5 G/50ML
12.5 SOLUTION INTRAVENOUS AS NEEDED
Status: ACTIVE | OUTPATIENT
Start: 2021-01-20 | End: 2021-01-20

## 2021-01-19 RX ORDER — PHYTONADIONE 2 MG/ML
2 INJECTION, EMULSION INTRAMUSCULAR; INTRAVENOUS; SUBCUTANEOUS ONCE
Status: COMPLETED | OUTPATIENT
Start: 2021-01-19 | End: 2021-01-19

## 2021-01-19 RX ADMIN — INSULIN DETEMIR 25 UNITS: 100 INJECTION, SOLUTION SUBCUTANEOUS at 09:00

## 2021-01-19 RX ADMIN — Medication 30 ML: at 08:40

## 2021-01-19 RX ADMIN — IPRATROPIUM BROMIDE AND ALBUTEROL SULFATE 3 ML: 2.5; .5 SOLUTION RESPIRATORY (INHALATION) at 19:30

## 2021-01-19 RX ADMIN — GUAIFENESIN 400 MG: 100 SOLUTION ORAL at 18:42

## 2021-01-19 RX ADMIN — DOCUSATE SODIUM 100 MG: 50 LIQUID ORAL at 19:52

## 2021-01-19 RX ADMIN — SENNOSIDES 10 ML: 8.8 LIQUID ORAL at 08:40

## 2021-01-19 RX ADMIN — IPRATROPIUM BROMIDE AND ALBUTEROL SULFATE 3 ML: 2.5; .5 SOLUTION RESPIRATORY (INHALATION) at 10:45

## 2021-01-19 RX ADMIN — Medication 30 ML: at 16:51

## 2021-01-19 RX ADMIN — Medication 30 ML: at 19:57

## 2021-01-19 RX ADMIN — MINERAL OIL: 1000 LIQUID ORAL at 08:40

## 2021-01-19 RX ADMIN — ACETAMINOPHEN ORAL SOLUTION 500 MG: 650 SOLUTION ORAL at 18:53

## 2021-01-19 RX ADMIN — ATORVASTATIN CALCIUM 80 MG: 40 TABLET, FILM COATED ORAL at 19:56

## 2021-01-19 RX ADMIN — ACETAMINOPHEN ORAL SOLUTION 500 MG: 650 SOLUTION ORAL at 08:29

## 2021-01-19 RX ADMIN — CARVEDILOL 25 MG: 12.5 TABLET, FILM COATED ORAL at 18:42

## 2021-01-19 RX ADMIN — Medication 5 MG: at 19:53

## 2021-01-19 RX ADMIN — GUAIFENESIN 400 MG: 100 SOLUTION ORAL at 06:11

## 2021-01-19 RX ADMIN — ASPIRIN 81 MG CHEWABLE TABLET 81 MG: 81 TABLET CHEWABLE at 08:30

## 2021-01-19 RX ADMIN — MINERAL OIL: 1000 LIQUID ORAL at 16:51

## 2021-01-19 RX ADMIN — INSULIN HUMAN 2 UNITS: 100 INJECTION, SOLUTION PARENTERAL at 13:00

## 2021-01-19 RX ADMIN — SENNOSIDES 10 ML: 8.8 LIQUID ORAL at 19:52

## 2021-01-19 RX ADMIN — RISPERIDONE 1 MG: 1 SOLUTION ORAL at 23:49

## 2021-01-19 RX ADMIN — IPRATROPIUM BROMIDE AND ALBUTEROL SULFATE 3 ML: 2.5; .5 SOLUTION RESPIRATORY (INHALATION) at 15:43

## 2021-01-19 RX ADMIN — ACETAMINOPHEN ORAL SOLUTION 500 MG: 650 SOLUTION ORAL at 12:06

## 2021-01-19 RX ADMIN — GUAIFENESIN 400 MG: 100 SOLUTION ORAL at 23:45

## 2021-01-19 RX ADMIN — IPRATROPIUM BROMIDE AND ALBUTEROL SULFATE 3 ML: 2.5; .5 SOLUTION RESPIRATORY (INHALATION) at 06:49

## 2021-01-19 RX ADMIN — SERTRALINE 50 MG: 50 TABLET, FILM COATED ORAL at 08:30

## 2021-01-19 RX ADMIN — LANSOPRAZOLE 30 MG: KIT at 06:13

## 2021-01-19 RX ADMIN — CARVEDILOL 25 MG: 12.5 TABLET, FILM COATED ORAL at 08:30

## 2021-01-19 RX ADMIN — DOCUSATE SODIUM 100 MG: 50 LIQUID ORAL at 08:29

## 2021-01-19 RX ADMIN — MINERAL OIL: 1000 LIQUID ORAL at 19:56

## 2021-01-19 RX ADMIN — GUAIFENESIN 400 MG: 100 SOLUTION ORAL at 12:07

## 2021-01-19 RX ADMIN — ACETAMINOPHEN ORAL SOLUTION 500 MG: 650 SOLUTION ORAL at 23:49

## 2021-01-19 RX ADMIN — PHYTONADIONE 2 MG: 1 INJECTION, EMULSION INTRAMUSCULAR; INTRAVENOUS; SUBCUTANEOUS at 18:42

## 2021-01-19 RX ADMIN — TERAZOSIN HYDROCHLORIDE 1 MG: 1 CAPSULE ORAL at 19:53

## 2021-01-20 ENCOUNTER — APPOINTMENT (OUTPATIENT)
Dept: GENERAL RADIOLOGY | Facility: HOSPITAL | Age: 71
End: 2021-01-20

## 2021-01-20 ENCOUNTER — ANESTHESIA (OUTPATIENT)
Dept: TELEMETRY | Facility: HOSPITAL | Age: 71
End: 2021-01-20

## 2021-01-20 ENCOUNTER — APPOINTMENT (OUTPATIENT)
Dept: NEPHROLOGY | Facility: HOSPITAL | Age: 71
End: 2021-01-20

## 2021-01-20 ENCOUNTER — ANESTHESIA EVENT (OUTPATIENT)
Dept: TELEMETRY | Facility: HOSPITAL | Age: 71
End: 2021-01-20

## 2021-01-20 ENCOUNTER — APPOINTMENT (OUTPATIENT)
Dept: CARDIOLOGY | Facility: HOSPITAL | Age: 71
End: 2021-01-20

## 2021-01-20 LAB
BH CV UPPER VENOUS LEFT AXILLARY COMPRESS: NORMAL
BH CV UPPER VENOUS LEFT AXILLARY PHASIC: NORMAL
BH CV UPPER VENOUS LEFT AXILLARY SPONT: NORMAL
BH CV UPPER VENOUS LEFT BASILIC FOREARM COLOR: 1
BH CV UPPER VENOUS LEFT BASILIC FOREARM COMPRESS: NORMAL
BH CV UPPER VENOUS LEFT BASILIC UPPER COMPRESS: NORMAL
BH CV UPPER VENOUS LEFT BRACHIAL COMPRESS: NORMAL
BH CV UPPER VENOUS LEFT CEPHALIC FOREARM COLOR: 1
BH CV UPPER VENOUS LEFT CEPHALIC FOREARM COMPRESS: NORMAL
BH CV UPPER VENOUS LEFT CEPHALIC UPPER COMPRESS: NORMAL
BH CV UPPER VENOUS LEFT INTERNAL JUGULAR COMPRESS: NORMAL
BH CV UPPER VENOUS LEFT INTERNAL JUGULAR PHASIC: NORMAL
BH CV UPPER VENOUS LEFT INTERNAL JUGULAR SPONT: NORMAL
BH CV UPPER VENOUS LEFT RADIAL COMPRESS: NORMAL
BH CV UPPER VENOUS LEFT SUBCLAVIAN COMPRESS: NORMAL
BH CV UPPER VENOUS LEFT SUBCLAVIAN PHASIC: NORMAL
BH CV UPPER VENOUS LEFT SUBCLAVIAN SPONT: NORMAL
BH CV UPPER VENOUS LEFT ULNAR COMPRESS: NORMAL
GLUCOSE BLDC GLUCOMTR-MCNC: 104 MG/DL (ref 70–130)
GLUCOSE BLDC GLUCOMTR-MCNC: 113 MG/DL (ref 70–130)
GLUCOSE BLDC GLUCOMTR-MCNC: 63 MG/DL (ref 70–130)
GLUCOSE BLDC GLUCOMTR-MCNC: 76 MG/DL (ref 70–130)
GLUCOSE BLDC GLUCOMTR-MCNC: 78 MG/DL (ref 70–130)
GLUCOSE BLDC GLUCOMTR-MCNC: 83 MG/DL (ref 70–130)
GLUCOSE BLDC GLUCOMTR-MCNC: 83 MG/DL (ref 70–130)
HCT VFR BLD AUTO: 29.5 % (ref 34–46.6)
HGB BLD-MCNC: 8.5 G/DL (ref 12–15.9)
INR PPP: 1.5 (ref 0.85–1.16)
POTASSIUM SERPL-SCNC: 3.4 MMOL/L (ref 3.5–5.2)
PROTHROMBIN TIME: 17.8 SECONDS (ref 11.5–14)

## 2021-01-20 PROCEDURE — 85014 HEMATOCRIT: CPT | Performed by: NURSE PRACTITIONER

## 2021-01-20 PROCEDURE — 94799 UNLISTED PULMONARY SVC/PX: CPT

## 2021-01-20 PROCEDURE — 93971 EXTREMITY STUDY: CPT | Performed by: INTERNAL MEDICINE

## 2021-01-20 PROCEDURE — 82962 GLUCOSE BLOOD TEST: CPT

## 2021-01-20 PROCEDURE — 84132 ASSAY OF SERUM POTASSIUM: CPT | Performed by: ANESTHESIOLOGY

## 2021-01-20 PROCEDURE — 05HN33Z INSERTION OF INFUSION DEVICE INTO LEFT INTERNAL JUGULAR VEIN, PERCUTANEOUS APPROACH: ICD-10-PCS | Performed by: SURGERY

## 2021-01-20 PROCEDURE — 93971 EXTREMITY STUDY: CPT

## 2021-01-20 PROCEDURE — 99233 SBSQ HOSP IP/OBS HIGH 50: CPT | Performed by: HOSPITALIST

## 2021-01-20 PROCEDURE — 25010000002 HEPARIN (PORCINE) PER 1000 UNITS: Performed by: NURSE PRACTITIONER

## 2021-01-20 PROCEDURE — 25010000002 ALBUMIN HUMAN 25% PER 50 ML: Performed by: INTERNAL MEDICINE

## 2021-01-20 PROCEDURE — 94660 CPAP INITIATION&MGMT: CPT

## 2021-01-20 PROCEDURE — 63710000001 INSULIN DETEMIR PER 5 UNITS: Performed by: INTERNAL MEDICINE

## 2021-01-20 PROCEDURE — 25010000002 EPOETIN ALFA-EPBX 10000 UNIT/ML SOLUTION: Performed by: INTERNAL MEDICINE

## 2021-01-20 PROCEDURE — 71045 X-RAY EXAM CHEST 1 VIEW: CPT

## 2021-01-20 PROCEDURE — 85018 HEMOGLOBIN: CPT | Performed by: NURSE PRACTITIONER

## 2021-01-20 PROCEDURE — 85610 PROTHROMBIN TIME: CPT | Performed by: NURSE PRACTITIONER

## 2021-01-20 PROCEDURE — 25010000002 GLUCAGON (HUMAN RECOMBINANT) 1 MG RECONSTITUTED SOLUTION: Performed by: PHYSICIAN ASSISTANT

## 2021-01-20 PROCEDURE — P9047 ALBUMIN (HUMAN), 25%, 50ML: HCPCS | Performed by: INTERNAL MEDICINE

## 2021-01-20 RX ORDER — HYDROMORPHONE HYDROCHLORIDE 1 MG/ML
0.5 INJECTION, SOLUTION INTRAMUSCULAR; INTRAVENOUS; SUBCUTANEOUS
Status: CANCELLED | OUTPATIENT
Start: 2021-01-20

## 2021-01-20 RX ADMIN — Medication 30 ML: at 21:22

## 2021-01-20 RX ADMIN — SODIUM CHLORIDE, PRESERVATIVE FREE 10 ML: 5 INJECTION INTRAVENOUS at 21:22

## 2021-01-20 RX ADMIN — IPRATROPIUM BROMIDE AND ALBUTEROL SULFATE 3 ML: 2.5; .5 SOLUTION RESPIRATORY (INHALATION) at 12:52

## 2021-01-20 RX ADMIN — MINERAL OIL 10 ML: 1000 LIQUID ORAL at 21:22

## 2021-01-20 RX ADMIN — EPOETIN ALFA-EPBX 10000 UNITS: 10000 INJECTION, SOLUTION INTRAVENOUS; SUBCUTANEOUS at 11:11

## 2021-01-20 RX ADMIN — Medication 30 ML: at 17:52

## 2021-01-20 RX ADMIN — MINERAL OIL: 1000 LIQUID ORAL at 17:51

## 2021-01-20 RX ADMIN — CARVEDILOL 25 MG: 12.5 TABLET, FILM COATED ORAL at 17:52

## 2021-01-20 RX ADMIN — HEPARIN SODIUM 1800 UNITS: 1000 INJECTION INTRAVENOUS; SUBCUTANEOUS at 11:11

## 2021-01-20 RX ADMIN — Medication 5 MG: at 21:23

## 2021-01-20 RX ADMIN — ATORVASTATIN CALCIUM 80 MG: 40 TABLET, FILM COATED ORAL at 21:21

## 2021-01-20 RX ADMIN — INSULIN DETEMIR 25 UNITS: 100 INJECTION, SOLUTION SUBCUTANEOUS at 12:38

## 2021-01-20 RX ADMIN — SENNOSIDES 10 ML: 8.8 LIQUID ORAL at 21:23

## 2021-01-20 RX ADMIN — Medication 30 ML: at 12:38

## 2021-01-20 RX ADMIN — IPRATROPIUM BROMIDE AND ALBUTEROL SULFATE 3 ML: 2.5; .5 SOLUTION RESPIRATORY (INHALATION) at 15:55

## 2021-01-20 RX ADMIN — ASPIRIN 81 MG CHEWABLE TABLET 81 MG: 81 TABLET CHEWABLE at 12:38

## 2021-01-20 RX ADMIN — GLUCAGON HYDROCHLORIDE 1 MG: 1 INJECTION, POWDER, FOR SOLUTION INTRAMUSCULAR; INTRAVENOUS; SUBCUTANEOUS at 06:50

## 2021-01-20 RX ADMIN — SERTRALINE 50 MG: 50 TABLET, FILM COATED ORAL at 12:38

## 2021-01-20 RX ADMIN — ACETAMINOPHEN ORAL SOLUTION 500 MG: 650 SOLUTION ORAL at 17:54

## 2021-01-20 RX ADMIN — GUAIFENESIN 400 MG: 100 SOLUTION ORAL at 12:38

## 2021-01-20 RX ADMIN — IPRATROPIUM BROMIDE AND ALBUTEROL SULFATE 3 ML: 2.5; .5 SOLUTION RESPIRATORY (INHALATION) at 19:49

## 2021-01-20 RX ADMIN — DOCUSATE SODIUM 100 MG: 50 LIQUID ORAL at 21:21

## 2021-01-20 RX ADMIN — ALBUMIN HUMAN 50 G: 0.25 SOLUTION INTRAVENOUS at 07:30

## 2021-01-20 RX ADMIN — GUAIFENESIN 400 MG: 100 SOLUTION ORAL at 17:54

## 2021-01-20 RX ADMIN — RISPERIDONE 1 MG: 1 SOLUTION ORAL at 21:21

## 2021-01-20 RX ADMIN — ACETAMINOPHEN ORAL SOLUTION 500 MG: 650 SOLUTION ORAL at 21:20

## 2021-01-21 ENCOUNTER — ANESTHESIA (OUTPATIENT)
Dept: GASTROENTEROLOGY | Facility: HOSPITAL | Age: 71
End: 2021-01-21

## 2021-01-21 ENCOUNTER — ANESTHESIA EVENT (OUTPATIENT)
Dept: GASTROENTEROLOGY | Facility: HOSPITAL | Age: 71
End: 2021-01-21

## 2021-01-21 LAB
ALBUMIN SERPL-MCNC: 4.1 G/DL (ref 3.5–5.2)
ANION GAP SERPL CALCULATED.3IONS-SCNC: 14 MMOL/L (ref 5–15)
BUN SERPL-MCNC: 44 MG/DL (ref 8–23)
BUN/CREAT SERPL: 15.7 (ref 7–25)
CALCIUM SPEC-SCNC: 9.4 MG/DL (ref 8.6–10.5)
CHLORIDE SERPL-SCNC: 95 MMOL/L (ref 98–107)
CO2 SERPL-SCNC: 23 MMOL/L (ref 22–29)
CREAT SERPL-MCNC: 2.8 MG/DL (ref 0.57–1)
DEPRECATED RDW RBC AUTO: 66.3 FL (ref 37–54)
ERYTHROCYTE [DISTWIDTH] IN BLOOD BY AUTOMATED COUNT: 17.8 % (ref 12.3–15.4)
GFR SERPL CREATININE-BSD FRML MDRD: 20 ML/MIN/1.73
GLUCOSE BLDC GLUCOMTR-MCNC: 110 MG/DL (ref 70–130)
GLUCOSE BLDC GLUCOMTR-MCNC: 116 MG/DL (ref 70–130)
GLUCOSE BLDC GLUCOMTR-MCNC: 136 MG/DL (ref 70–130)
GLUCOSE BLDC GLUCOMTR-MCNC: 68 MG/DL (ref 70–130)
GLUCOSE BLDC GLUCOMTR-MCNC: 77 MG/DL (ref 70–130)
GLUCOSE SERPL-MCNC: 80 MG/DL (ref 65–99)
HCT VFR BLD AUTO: 29.6 % (ref 34–46.6)
HCT VFR BLD AUTO: 29.8 % (ref 34–46.6)
HCT VFR BLD AUTO: 30.4 % (ref 34–46.6)
HCT VFR BLD AUTO: 31.4 % (ref 34–46.6)
HGB BLD-MCNC: 8.6 G/DL (ref 12–15.9)
HGB BLD-MCNC: 8.7 G/DL (ref 12–15.9)
HGB BLD-MCNC: 8.7 G/DL (ref 12–15.9)
HGB BLD-MCNC: 9 G/DL (ref 12–15.9)
INR PPP: 1.32 (ref 0.85–1.16)
INR PPP: 1.33 (ref 0.85–1.16)
MCH RBC QN AUTO: 30.4 PG (ref 26.6–33)
MCHC RBC AUTO-ENTMCNC: 29.4 G/DL (ref 31.5–35.7)
MCV RBC AUTO: 103.5 FL (ref 79–97)
PHOSPHATE SERPL-MCNC: 3.3 MG/DL (ref 2.5–4.5)
PLATELET # BLD AUTO: 230 10*3/MM3 (ref 140–450)
PMV BLD AUTO: 10.6 FL (ref 6–12)
POTASSIUM SERPL-SCNC: 3.9 MMOL/L (ref 3.5–5.2)
PROTHROMBIN TIME: 16.1 SECONDS (ref 11.5–14)
PROTHROMBIN TIME: 16.2 SECONDS (ref 11.5–14)
RBC # BLD AUTO: 2.86 10*6/MM3 (ref 3.77–5.28)
SODIUM SERPL-SCNC: 132 MMOL/L (ref 136–145)
WBC # BLD AUTO: 10.72 10*3/MM3 (ref 3.4–10.8)

## 2021-01-21 PROCEDURE — 82962 GLUCOSE BLOOD TEST: CPT

## 2021-01-21 PROCEDURE — 0DH63UZ INSERTION OF FEEDING DEVICE INTO STOMACH, PERCUTANEOUS APPROACH: ICD-10-PCS | Performed by: INTERNAL MEDICINE

## 2021-01-21 PROCEDURE — 85027 COMPLETE CBC AUTOMATED: CPT | Performed by: INTERNAL MEDICINE

## 2021-01-21 PROCEDURE — 97530 THERAPEUTIC ACTIVITIES: CPT

## 2021-01-21 PROCEDURE — 43255 EGD CONTROL BLEEDING ANY: CPT | Performed by: INTERNAL MEDICINE

## 2021-01-21 PROCEDURE — 0D568ZZ DESTRUCTION OF STOMACH, VIA NATURAL OR ARTIFICIAL OPENING ENDOSCOPIC: ICD-10-PCS | Performed by: INTERNAL MEDICINE

## 2021-01-21 PROCEDURE — 94799 UNLISTED PULMONARY SVC/PX: CPT

## 2021-01-21 PROCEDURE — 25010000003 CEFAZOLIN PER 500 MG: Performed by: NURSE ANESTHETIST, CERTIFIED REGISTERED

## 2021-01-21 PROCEDURE — 85014 HEMATOCRIT: CPT | Performed by: NURSE PRACTITIONER

## 2021-01-21 PROCEDURE — 80069 RENAL FUNCTION PANEL: CPT | Performed by: INTERNAL MEDICINE

## 2021-01-21 PROCEDURE — 85610 PROTHROMBIN TIME: CPT | Performed by: NURSE PRACTITIONER

## 2021-01-21 PROCEDURE — 92526 ORAL FUNCTION THERAPY: CPT

## 2021-01-21 PROCEDURE — 85018 HEMOGLOBIN: CPT | Performed by: NURSE PRACTITIONER

## 2021-01-21 PROCEDURE — 63710000001 INSULIN DETEMIR PER 5 UNITS: Performed by: INTERNAL MEDICINE

## 2021-01-21 PROCEDURE — 97110 THERAPEUTIC EXERCISES: CPT

## 2021-01-21 PROCEDURE — 43246 EGD PLACE GASTROSTOMY TUBE: CPT | Performed by: INTERNAL MEDICINE

## 2021-01-21 PROCEDURE — 85610 PROTHROMBIN TIME: CPT | Performed by: HOSPITALIST

## 2021-01-21 PROCEDURE — 99233 SBSQ HOSP IP/OBS HIGH 50: CPT | Performed by: HOSPITALIST

## 2021-01-21 PROCEDURE — 25010000002 PROPOFOL 10 MG/ML EMULSION: Performed by: NURSE ANESTHETIST, CERTIFIED REGISTERED

## 2021-01-21 RX ORDER — LIDOCAINE HYDROCHLORIDE 10 MG/ML
INJECTION, SOLUTION EPIDURAL; INFILTRATION; INTRACAUDAL; PERINEURAL AS NEEDED
Status: DISCONTINUED | OUTPATIENT
Start: 2021-01-21 | End: 2021-01-21 | Stop reason: SURG

## 2021-01-21 RX ORDER — SODIUM CHLORIDE, SODIUM LACTATE, POTASSIUM CHLORIDE, CALCIUM CHLORIDE 600; 310; 30; 20 MG/100ML; MG/100ML; MG/100ML; MG/100ML
9 INJECTION, SOLUTION INTRAVENOUS CONTINUOUS
Status: DISCONTINUED | OUTPATIENT
Start: 2021-01-21 | End: 2021-01-22

## 2021-01-21 RX ORDER — CEFAZOLIN SODIUM 1 G/3ML
INJECTION, POWDER, FOR SOLUTION INTRAMUSCULAR; INTRAVENOUS AS NEEDED
Status: DISCONTINUED | OUTPATIENT
Start: 2021-01-21 | End: 2021-01-21 | Stop reason: SURG

## 2021-01-21 RX ORDER — GLYCOPYRROLATE 0.2 MG/ML
INJECTION INTRAMUSCULAR; INTRAVENOUS AS NEEDED
Status: DISCONTINUED | OUTPATIENT
Start: 2021-01-21 | End: 2021-01-21 | Stop reason: SURG

## 2021-01-21 RX ORDER — SODIUM CHLORIDE 9 MG/ML
9 INJECTION, SOLUTION INTRAVENOUS CONTINUOUS
Status: DISCONTINUED | OUTPATIENT
Start: 2021-01-21 | End: 2021-01-22

## 2021-01-21 RX ORDER — SODIUM CHLORIDE 0.9 % (FLUSH) 0.9 %
10 SYRINGE (ML) INJECTION EVERY 12 HOURS SCHEDULED
Status: DISCONTINUED | OUTPATIENT
Start: 2021-01-21 | End: 2021-01-21 | Stop reason: HOSPADM

## 2021-01-21 RX ORDER — WARFARIN SODIUM 7.5 MG/1
7.5 TABLET ORAL
Status: COMPLETED | OUTPATIENT
Start: 2021-01-21 | End: 2021-01-21

## 2021-01-21 RX ORDER — KETAMINE HCL IN NACL, ISO-OSM 100MG/10ML
SYRINGE (ML) INJECTION AS NEEDED
Status: DISCONTINUED | OUTPATIENT
Start: 2021-01-21 | End: 2021-01-21 | Stop reason: SURG

## 2021-01-21 RX ORDER — DEXTROSE MONOHYDRATE 25 G/50ML
25 INJECTION, SOLUTION INTRAVENOUS
Status: DISCONTINUED | OUTPATIENT
Start: 2021-01-21 | End: 2021-02-17 | Stop reason: HOSPADM

## 2021-01-21 RX ORDER — PROPOFOL 10 MG/ML
VIAL (ML) INTRAVENOUS AS NEEDED
Status: DISCONTINUED | OUTPATIENT
Start: 2021-01-21 | End: 2021-01-21 | Stop reason: SURG

## 2021-01-21 RX ORDER — LIDOCAINE HYDROCHLORIDE 10 MG/ML
0.5 INJECTION, SOLUTION EPIDURAL; INFILTRATION; INTRACAUDAL; PERINEURAL ONCE AS NEEDED
Status: DISCONTINUED | OUTPATIENT
Start: 2021-01-21 | End: 2021-01-21 | Stop reason: HOSPADM

## 2021-01-21 RX ORDER — HYDROXYZINE HYDROCHLORIDE 25 MG/1
25 TABLET, FILM COATED ORAL 3 TIMES DAILY PRN
Status: DISCONTINUED | OUTPATIENT
Start: 2021-01-21 | End: 2021-01-22

## 2021-01-21 RX ORDER — WARFARIN SODIUM 5 MG/1
5 TABLET ORAL
Status: DISCONTINUED | OUTPATIENT
Start: 2021-01-22 | End: 2021-01-23

## 2021-01-21 RX ORDER — SODIUM CHLORIDE 0.9 % (FLUSH) 0.9 %
10 SYRINGE (ML) INJECTION AS NEEDED
Status: DISCONTINUED | OUTPATIENT
Start: 2021-01-21 | End: 2021-01-21 | Stop reason: HOSPADM

## 2021-01-21 RX ADMIN — ACETAMINOPHEN ORAL SOLUTION 500 MG: 650 SOLUTION ORAL at 18:13

## 2021-01-21 RX ADMIN — MINERAL OIL 10 ML: 1000 LIQUID ORAL at 22:56

## 2021-01-21 RX ADMIN — WARFARIN SODIUM 7.5 MG: 7.5 TABLET ORAL at 18:14

## 2021-01-21 RX ADMIN — GUAIFENESIN 400 MG: 100 SOLUTION ORAL at 05:46

## 2021-01-21 RX ADMIN — SERTRALINE 50 MG: 50 TABLET, FILM COATED ORAL at 12:09

## 2021-01-21 RX ADMIN — INSULIN DETEMIR 25 UNITS: 100 INJECTION, SOLUTION SUBCUTANEOUS at 12:14

## 2021-01-21 RX ADMIN — HYDROXYZINE HYDROCHLORIDE 25 MG: 25 TABLET, FILM COATED ORAL at 15:47

## 2021-01-21 RX ADMIN — CEFAZOLIN SODIUM 3 G: 1 INJECTION, POWDER, FOR SOLUTION INTRAMUSCULAR; INTRAVENOUS at 10:34

## 2021-01-21 RX ADMIN — Medication 10 MG: at 10:53

## 2021-01-21 RX ADMIN — Medication 5 MG: at 22:55

## 2021-01-21 RX ADMIN — SENNOSIDES 10 ML: 8.8 LIQUID ORAL at 12:10

## 2021-01-21 RX ADMIN — GUAIFENESIN 400 MG: 100 SOLUTION ORAL at 00:12

## 2021-01-21 RX ADMIN — RISPERIDONE 1 MG: 1 SOLUTION ORAL at 22:56

## 2021-01-21 RX ADMIN — LIDOCAINE HYDROCHLORIDE 100 MG: 10 INJECTION, SOLUTION EPIDURAL; INFILTRATION; INTRACAUDAL; PERINEURAL at 10:35

## 2021-01-21 RX ADMIN — ACETAMINOPHEN ORAL SOLUTION 500 MG: 650 SOLUTION ORAL at 12:10

## 2021-01-21 RX ADMIN — ACETAMINOPHEN ORAL SOLUTION 500 MG: 650 SOLUTION ORAL at 22:53

## 2021-01-21 RX ADMIN — DOCUSATE SODIUM 100 MG: 50 LIQUID ORAL at 22:54

## 2021-01-21 RX ADMIN — ACETAMINOPHEN ORAL SOLUTION 650 MG: 650 SOLUTION ORAL at 00:12

## 2021-01-21 RX ADMIN — SODIUM CHLORIDE, PRESERVATIVE FREE 10 ML: 5 INJECTION INTRAVENOUS at 12:11

## 2021-01-21 RX ADMIN — IPRATROPIUM BROMIDE AND ALBUTEROL SULFATE 3 ML: 2.5; .5 SOLUTION RESPIRATORY (INHALATION) at 09:12

## 2021-01-21 RX ADMIN — IPRATROPIUM BROMIDE AND ALBUTEROL SULFATE 3 ML: 2.5; .5 SOLUTION RESPIRATORY (INHALATION) at 12:30

## 2021-01-21 RX ADMIN — SODIUM CHLORIDE, PRESERVATIVE FREE 10 ML: 5 INJECTION INTRAVENOUS at 22:55

## 2021-01-21 RX ADMIN — IPRATROPIUM BROMIDE AND ALBUTEROL SULFATE 3 ML: 2.5; .5 SOLUTION RESPIRATORY (INHALATION) at 16:48

## 2021-01-21 RX ADMIN — MINERAL OIL: 1000 LIQUID ORAL at 15:47

## 2021-01-21 RX ADMIN — GLYCOPYRROLATE 0.4 MG: 0.4 INJECTION INTRAMUSCULAR; INTRAVENOUS at 10:19

## 2021-01-21 RX ADMIN — CARVEDILOL 25 MG: 12.5 TABLET, FILM COATED ORAL at 18:14

## 2021-01-21 RX ADMIN — PROPOFOL 10 MG: 10 INJECTION, EMULSION INTRAVENOUS at 10:38

## 2021-01-21 RX ADMIN — SENNOSIDES 10 ML: 8.8 LIQUID ORAL at 22:54

## 2021-01-21 RX ADMIN — SODIUM CHLORIDE 9 ML/HR: 9 INJECTION, SOLUTION INTRAVENOUS at 10:22

## 2021-01-21 RX ADMIN — SODIUM CHLORIDE, POTASSIUM CHLORIDE, SODIUM LACTATE AND CALCIUM CHLORIDE: 600; 310; 30; 20 INJECTION, SOLUTION INTRAVENOUS at 10:33

## 2021-01-21 RX ADMIN — Medication 10 MG: at 10:38

## 2021-01-21 RX ADMIN — DEXTROSE MONOHYDRATE 25 ML: 25 INJECTION, SOLUTION INTRAVENOUS at 10:24

## 2021-01-21 RX ADMIN — Medication 10 MG: at 10:35

## 2021-01-21 RX ADMIN — GUAIFENESIN 400 MG: 100 SOLUTION ORAL at 23:08

## 2021-01-21 RX ADMIN — Medication 30 ML: at 15:47

## 2021-01-21 RX ADMIN — ASPIRIN 81 MG CHEWABLE TABLET 81 MG: 81 TABLET CHEWABLE at 12:09

## 2021-01-21 RX ADMIN — IPRATROPIUM BROMIDE AND ALBUTEROL SULFATE 3 ML: 2.5; .5 SOLUTION RESPIRATORY (INHALATION) at 19:31

## 2021-01-21 RX ADMIN — Medication 30 ML: at 22:55

## 2021-01-21 RX ADMIN — GUAIFENESIN 400 MG: 100 SOLUTION ORAL at 12:57

## 2021-01-21 RX ADMIN — HYDROXYZINE HYDROCHLORIDE 25 MG: 25 TABLET, FILM COATED ORAL at 22:54

## 2021-01-21 RX ADMIN — TERAZOSIN HYDROCHLORIDE 1 MG: 1 CAPSULE ORAL at 22:54

## 2021-01-21 RX ADMIN — DOCUSATE SODIUM 100 MG: 50 LIQUID ORAL at 12:09

## 2021-01-21 RX ADMIN — LANSOPRAZOLE 30 MG: KIT at 05:46

## 2021-01-21 RX ADMIN — GUAIFENESIN 400 MG: 100 SOLUTION ORAL at 18:14

## 2021-01-21 RX ADMIN — ATORVASTATIN CALCIUM 80 MG: 40 TABLET, FILM COATED ORAL at 22:54

## 2021-01-21 NOTE — ANESTHESIA PREPROCEDURE EVALUATION
Anesthesia Evaluation                  Airway   Mallampati: II  Dental      Pulmonary    (+) pneumonia , asthma,  Cardiovascular     (+) hypertension, CAD, CHF , DVT,       Neuro/Psych  GI/Hepatic/Renal/Endo    (+) morbid obesity,  diabetes mellitus,     Musculoskeletal     Abdominal    Substance History      OB/GYN          Other        ROS/Med Hx Other: Prob diff airwa                Anesthesia Plan    ASA 4     MAC       Anesthetic plan, all risks, benefits, and alternatives have been provided, discussed and informed consent has been obtained with: patient.

## 2021-01-21 NOTE — ANESTHESIA POSTPROCEDURE EVALUATION
Patient: Joy Bueno    Procedure Summary     Date: 01/21/21 Room / Location:  [facility]    Anesthesia Start: 1033 Anesthesia Stop: 1102    Procedure: ESOPHAGOGASTRODUODENOSCOPY WITH PERCUTANEOUS ENDOSCOPIC GASTROSTOMY TUBE INSERTION (N/A ) Diagnosis:     Surgeon: Velasquez Call MD Provider: Nate Kathleen MD    Anesthesia Type: MAC ASA Status: 4          Anesthesia Type: MAC    Vitals  No vitals data found for the desired time range.          Post Anesthesia Care and Evaluation    Patient location during evaluation: PACU  Patient participation: complete - patient participated  Level of consciousness: awake and alert  Pain management: adequate  Airway patency: patent  Anesthetic complications: No anesthetic complications  PONV Status: none  Cardiovascular status: hemodynamically stable and acceptable  Respiratory status: nonlabored ventilation, acceptable and nasal cannula  Hydration status: acceptable

## 2021-01-22 ENCOUNTER — APPOINTMENT (OUTPATIENT)
Dept: GENERAL RADIOLOGY | Facility: HOSPITAL | Age: 71
End: 2021-01-22

## 2021-01-22 ENCOUNTER — APPOINTMENT (OUTPATIENT)
Dept: NEPHROLOGY | Facility: HOSPITAL | Age: 71
End: 2021-01-22

## 2021-01-22 ENCOUNTER — APPOINTMENT (OUTPATIENT)
Dept: CT IMAGING | Facility: HOSPITAL | Age: 71
End: 2021-01-22

## 2021-01-22 LAB
ALBUMIN SERPL-MCNC: 3.9 G/DL (ref 3.5–5.2)
ALP SERPL-CCNC: 129 U/L (ref 39–117)
ALT SERPL W P-5'-P-CCNC: 6 U/L (ref 1–33)
AMMONIA BLD-SCNC: 22 UMOL/L (ref 11–51)
ANION GAP SERPL CALCULATED.3IONS-SCNC: 14 MMOL/L (ref 5–15)
ARTERIAL PATENCY WRIST A: ABNORMAL
AST SERPL-CCNC: 11 U/L (ref 1–32)
ATMOSPHERIC PRESS: ABNORMAL MM[HG]
BASE EXCESS BLDA CALC-SCNC: -0.2 MMOL/L (ref 0–2)
BASE EXCESS BLDA CALC-SCNC: -1.3 MMOL/L (ref 0–2)
BASE EXCESS BLDA CALC-SCNC: -1.7 MMOL/L (ref 0–2)
BASE EXCESS BLDA CALC-SCNC: -1.9 MMOL/L (ref 0–2)
BASOPHILS # BLD AUTO: 0.03 10*3/MM3 (ref 0–0.2)
BASOPHILS NFR BLD AUTO: 0.3 % (ref 0–1.5)
BDY SITE: ABNORMAL
BILIRUB SERPL-MCNC: 0.3 MG/DL (ref 0–1.2)
BODY TEMPERATURE: 37 C
BUN SERPL-MCNC: 65 MG/DL (ref 8–23)
CALCIUM SPEC-SCNC: 9.1 MG/DL (ref 8.6–10.5)
CHLORIDE SERPL-SCNC: 93 MMOL/L (ref 98–107)
CHOLEST SERPL-MCNC: 68 MG/DL (ref 0–200)
CO2 BLDA-SCNC: 27 MMOL/L (ref 22–33)
CO2 BLDA-SCNC: 27.7 MMOL/L (ref 22–33)
CO2 BLDA-SCNC: 28.2 MMOL/L (ref 22–33)
CO2 BLDA-SCNC: 28.8 MMOL/L (ref 22–33)
CO2 SERPL-SCNC: 23 MMOL/L (ref 22–29)
COHGB MFR BLD: 0.9 % (ref 0–2)
COHGB MFR BLD: 1 % (ref 0–2)
COHGB MFR BLD: 1.1 % (ref 0–2)
COHGB MFR BLD: 1.2 % (ref 0–2)
CREAT SERPL-MCNC: 3.81 MG/DL (ref 0.57–1)
D-LACTATE SERPL-SCNC: 0.8 MMOL/L (ref 0.5–2)
DEPRECATED RDW RBC AUTO: 65.9 FL (ref 37–54)
EOSINOPHIL # BLD AUTO: 0.27 10*3/MM3 (ref 0–0.4)
EOSINOPHIL NFR BLD AUTO: 2.9 % (ref 0.3–6.2)
EPAP: 0
EPAP: 0
EPAP: 8
ERYTHROCYTE [DISTWIDTH] IN BLOOD BY AUTOMATED COUNT: 18 % (ref 12.3–15.4)
GLUCOSE BLDC GLUCOMTR-MCNC: 120 MG/DL (ref 70–130)
GLUCOSE BLDC GLUCOMTR-MCNC: 153 MG/DL (ref 70–130)
GLUCOSE SERPL-MCNC: 143 MG/DL (ref 65–99)
HCO3 BLDA-SCNC: 25.3 MMOL/L (ref 20–26)
HCO3 BLDA-SCNC: 25.9 MMOL/L (ref 20–26)
HCO3 BLDA-SCNC: 26.3 MMOL/L (ref 20–26)
HCO3 BLDA-SCNC: 27 MMOL/L (ref 20–26)
HCT VFR BLD AUTO: 29.2 % (ref 34–46.6)
HCT VFR BLD AUTO: 29.2 % (ref 34–46.6)
HCT VFR BLD AUTO: 30.1 % (ref 34–46.6)
HCT VFR BLD AUTO: 30.5 % (ref 34–46.6)
HCT VFR BLD CALC: 24.5 %
HCT VFR BLD CALC: 24.7 %
HCT VFR BLD CALC: 26 %
HCT VFR BLD CALC: 26.8 %
HGB BLD-MCNC: 8.4 G/DL (ref 12–15.9)
HGB BLD-MCNC: 8.5 G/DL (ref 12–15.9)
HGB BLD-MCNC: 8.6 G/DL (ref 12–15.9)
HGB BLD-MCNC: 8.7 G/DL (ref 12–15.9)
HGB BLDA-MCNC: 8 G/DL (ref 14–18)
HGB BLDA-MCNC: 8 G/DL (ref 14–18)
HGB BLDA-MCNC: 8.5 G/DL (ref 14–18)
HGB BLDA-MCNC: 8.7 G/DL (ref 14–18)
IMM GRANULOCYTES # BLD AUTO: 0.09 10*3/MM3 (ref 0–0.05)
IMM GRANULOCYTES NFR BLD AUTO: 1 % (ref 0–0.5)
INHALED O2 CONCENTRATION: 28 %
INHALED O2 CONCENTRATION: 32 %
INHALED O2 CONCENTRATION: 35 %
INHALED O2 CONCENTRATION: 35 %
INR PPP: 1.42 (ref 0.85–1.16)
IPAP: 0
IPAP: 0
IPAP: 18
LYMPHOCYTES # BLD AUTO: 1.18 10*3/MM3 (ref 0.7–3.1)
LYMPHOCYTES NFR BLD AUTO: 12.6 % (ref 19.6–45.3)
MAGNESIUM SERPL-MCNC: 2 MG/DL (ref 1.6–2.4)
MCH RBC QN AUTO: 29.6 PG (ref 26.6–33)
MCHC RBC AUTO-ENTMCNC: 28.6 G/DL (ref 31.5–35.7)
MCV RBC AUTO: 103.4 FL (ref 79–97)
METHGB BLD QL: 0 % (ref 0–1.5)
METHGB BLD QL: 0.6 % (ref 0–1.5)
METHGB BLD QL: 0.7 % (ref 0–1.5)
METHGB BLD QL: 0.8 % (ref 0–1.5)
MODALITY: ABNORMAL
MONOCYTES # BLD AUTO: 0.84 10*3/MM3 (ref 0.1–0.9)
MONOCYTES NFR BLD AUTO: 9 % (ref 5–12)
MRSA DNA SPEC QL NAA+PROBE: POSITIVE
NEUTROPHILS NFR BLD AUTO: 6.94 10*3/MM3 (ref 1.7–7)
NEUTROPHILS NFR BLD AUTO: 74.2 % (ref 42.7–76)
NOTE: ABNORMAL
NRBC BLD AUTO-RTO: 0.7 /100 WBC (ref 0–0.2)
OXYHGB MFR BLDV: 86.6 % (ref 94–99)
OXYHGB MFR BLDV: 95.4 % (ref 94–99)
OXYHGB MFR BLDV: 96.8 % (ref 94–99)
OXYHGB MFR BLDV: 97.6 % (ref 94–99)
PAW @ PEAK INSP FLOW SETTING VENT: 0 CMH2O
PCO2 BLDA: 55.1 MM HG (ref 35–45)
PCO2 BLDA: 57.5 MM HG (ref 35–45)
PCO2 BLDA: 57.7 MM HG (ref 35–45)
PCO2 BLDA: 63.9 MM HG (ref 35–45)
PCO2 TEMP ADJ BLD: 55.1 MM HG (ref 35–45)
PCO2 TEMP ADJ BLD: 57.5 MM HG (ref 35–45)
PCO2 TEMP ADJ BLD: 57.7 MM HG (ref 35–45)
PCO2 TEMP ADJ BLD: 63.9 MM HG (ref 35–45)
PH BLDA: 7.22 PH UNITS (ref 7.35–7.45)
PH BLDA: 7.26 PH UNITS (ref 7.35–7.45)
PH BLDA: 7.27 PH UNITS (ref 7.35–7.45)
PH BLDA: 7.28 PH UNITS (ref 7.35–7.45)
PH, TEMP CORRECTED: 7.22 PH UNITS
PH, TEMP CORRECTED: 7.26 PH UNITS
PH, TEMP CORRECTED: 7.27 PH UNITS
PH, TEMP CORRECTED: 7.28 PH UNITS
PHOSPHATE SERPL-MCNC: 3.3 MG/DL (ref 2.5–4.5)
PLATELET # BLD AUTO: 214 10*3/MM3 (ref 140–450)
PMV BLD AUTO: 10.6 FL (ref 6–12)
PO2 BLDA: 111 MM HG (ref 83–108)
PO2 BLDA: 120 MM HG (ref 83–108)
PO2 BLDA: 53.9 MM HG (ref 83–108)
PO2 BLDA: 83.8 MM HG (ref 83–108)
PO2 TEMP ADJ BLD: 111 MM HG (ref 83–108)
PO2 TEMP ADJ BLD: 120 MM HG (ref 83–108)
PO2 TEMP ADJ BLD: 53.9 MM HG (ref 83–108)
PO2 TEMP ADJ BLD: 83.8 MM HG (ref 83–108)
POTASSIUM SERPL-SCNC: 3.7 MMOL/L (ref 3.5–5.2)
PROCALCITONIN SERPL-MCNC: 3.55 NG/ML (ref 0–0.25)
PROT SERPL-MCNC: 7.5 G/DL (ref 6–8.5)
PROTHROMBIN TIME: 17 SECONDS (ref 11.5–14)
RBC # BLD AUTO: 2.91 10*6/MM3 (ref 3.77–5.28)
SODIUM SERPL-SCNC: 130 MMOL/L (ref 136–145)
TOTAL RATE: 0 BREATHS/MINUTE
TRIGL SERPL-MCNC: 137 MG/DL (ref 0–150)
VENTILATOR MODE: ABNORMAL
WBC # BLD AUTO: 9.35 10*3/MM3 (ref 3.4–10.8)

## 2021-01-22 PROCEDURE — 82465 ASSAY BLD/SERUM CHOLESTEROL: CPT | Performed by: HOSPITALIST

## 2021-01-22 PROCEDURE — 25010000002 MEROPENEM PER 100 MG: Performed by: INTERNAL MEDICINE

## 2021-01-22 PROCEDURE — 94660 CPAP INITIATION&MGMT: CPT

## 2021-01-22 PROCEDURE — 70450 CT HEAD/BRAIN W/O DYE: CPT

## 2021-01-22 PROCEDURE — 85610 PROTHROMBIN TIME: CPT | Performed by: NURSE PRACTITIONER

## 2021-01-22 PROCEDURE — 83050 HGB METHEMOGLOBIN QUAN: CPT

## 2021-01-22 PROCEDURE — 99221 1ST HOSP IP/OBS SF/LOW 40: CPT | Performed by: PSYCHIATRY & NEUROLOGY

## 2021-01-22 PROCEDURE — 25010000002 VANCOMYCIN 10 G RECONSTITUTED SOLUTION

## 2021-01-22 PROCEDURE — 80053 COMPREHEN METABOLIC PANEL: CPT | Performed by: HOSPITALIST

## 2021-01-22 PROCEDURE — 85018 HEMOGLOBIN: CPT | Performed by: NURSE PRACTITIONER

## 2021-01-22 PROCEDURE — 71045 X-RAY EXAM CHEST 1 VIEW: CPT

## 2021-01-22 PROCEDURE — 82375 ASSAY CARBOXYHB QUANT: CPT

## 2021-01-22 PROCEDURE — 84100 ASSAY OF PHOSPHORUS: CPT | Performed by: HOSPITALIST

## 2021-01-22 PROCEDURE — 84145 PROCALCITONIN (PCT): CPT | Performed by: HOSPITALIST

## 2021-01-22 PROCEDURE — 94799 UNLISTED PULMONARY SVC/PX: CPT

## 2021-01-22 PROCEDURE — 82805 BLOOD GASES W/O2 SATURATION: CPT

## 2021-01-22 PROCEDURE — 99233 SBSQ HOSP IP/OBS HIGH 50: CPT | Performed by: HOSPITALIST

## 2021-01-22 PROCEDURE — 25010000002 ALTEPLASE 2 MG RECONSTITUTED SOLUTION 1 EACH VIAL: Performed by: HOSPITALIST

## 2021-01-22 PROCEDURE — 82140 ASSAY OF AMMONIA: CPT | Performed by: PSYCHIATRY & NEUROLOGY

## 2021-01-22 PROCEDURE — 25010000002 EPOETIN ALFA-EPBX 10000 UNIT/ML SOLUTION: Performed by: INTERNAL MEDICINE

## 2021-01-22 PROCEDURE — 87040 BLOOD CULTURE FOR BACTERIA: CPT | Performed by: HOSPITALIST

## 2021-01-22 PROCEDURE — 84478 ASSAY OF TRIGLYCERIDES: CPT | Performed by: HOSPITALIST

## 2021-01-22 PROCEDURE — 63710000001 INSULIN DETEMIR PER 5 UNITS: Performed by: INTERNAL MEDICINE

## 2021-01-22 PROCEDURE — 99291 CRITICAL CARE FIRST HOUR: CPT | Performed by: INTERNAL MEDICINE

## 2021-01-22 PROCEDURE — 83735 ASSAY OF MAGNESIUM: CPT | Performed by: HOSPITALIST

## 2021-01-22 PROCEDURE — 83605 ASSAY OF LACTIC ACID: CPT | Performed by: HOSPITALIST

## 2021-01-22 PROCEDURE — 85014 HEMATOCRIT: CPT | Performed by: NURSE PRACTITIONER

## 2021-01-22 PROCEDURE — 25010000002 ALBUMIN HUMAN 25% PER 50 ML: Performed by: INTERNAL MEDICINE

## 2021-01-22 PROCEDURE — 25010000002 NALOXONE PER 1 MG: Performed by: NURSE PRACTITIONER

## 2021-01-22 PROCEDURE — 25010000002 HEPARIN (PORCINE) PER 1000 UNITS: Performed by: NURSE PRACTITIONER

## 2021-01-22 PROCEDURE — P9047 ALBUMIN (HUMAN), 25%, 50ML: HCPCS | Performed by: INTERNAL MEDICINE

## 2021-01-22 PROCEDURE — 82962 GLUCOSE BLOOD TEST: CPT

## 2021-01-22 PROCEDURE — 87641 MR-STAPH DNA AMP PROBE: CPT | Performed by: HOSPITALIST

## 2021-01-22 PROCEDURE — 36600 WITHDRAWAL OF ARTERIAL BLOOD: CPT

## 2021-01-22 PROCEDURE — 25010000002 PIPERACILLIN SOD-TAZOBACTAM PER 1 G: Performed by: HOSPITALIST

## 2021-01-22 PROCEDURE — 85025 COMPLETE CBC W/AUTO DIFF WBC: CPT | Performed by: HOSPITALIST

## 2021-01-22 RX ORDER — NALOXONE HCL 0.4 MG/ML
0.4 VIAL (ML) INJECTION ONCE
Status: COMPLETED | OUTPATIENT
Start: 2021-01-22 | End: 2021-01-22

## 2021-01-22 RX ORDER — ACETAMINOPHEN 160 MG/5ML
500 SOLUTION ORAL EVERY 6 HOURS PRN
Status: DISCONTINUED | OUTPATIENT
Start: 2021-01-22 | End: 2021-01-28

## 2021-01-22 RX ORDER — IPRATROPIUM BROMIDE AND ALBUTEROL SULFATE 2.5; .5 MG/3ML; MG/3ML
3 SOLUTION RESPIRATORY (INHALATION)
Status: DISCONTINUED | OUTPATIENT
Start: 2021-01-22 | End: 2021-01-23

## 2021-01-22 RX ORDER — ALBUMIN (HUMAN) 12.5 G/50ML
25 SOLUTION INTRAVENOUS ONCE
Status: COMPLETED | OUTPATIENT
Start: 2021-01-22 | End: 2021-01-22

## 2021-01-22 RX ORDER — POLYETHYLENE GLYCOL 3350 17 G/17G
17 POWDER, FOR SOLUTION ORAL DAILY
Status: DISCONTINUED | OUTPATIENT
Start: 2021-01-22 | End: 2021-01-22

## 2021-01-22 RX ORDER — ALBUMIN (HUMAN) 12.5 G/50ML
12.5 SOLUTION INTRAVENOUS AS NEEDED
Status: ACTIVE | OUTPATIENT
Start: 2021-01-22 | End: 2021-01-22

## 2021-01-22 RX ORDER — BISACODYL 5 MG/1
10 TABLET, DELAYED RELEASE ORAL DAILY PRN
Status: DISCONTINUED | OUTPATIENT
Start: 2021-01-22 | End: 2021-02-05

## 2021-01-22 RX ADMIN — INSULIN DETEMIR 25 UNITS: 100 INJECTION, SOLUTION SUBCUTANEOUS at 13:57

## 2021-01-22 RX ADMIN — SODIUM CHLORIDE, PRESERVATIVE FREE 10 ML: 5 INJECTION INTRAVENOUS at 22:26

## 2021-01-22 RX ADMIN — NALXONE HYDROCHLORIDE 0.4 MG: 0.4 INJECTION INTRAMUSCULAR; INTRAVENOUS; SUBCUTANEOUS at 22:28

## 2021-01-22 RX ADMIN — IPRATROPIUM BROMIDE AND ALBUTEROL SULFATE 3 ML: 2.5; .5 SOLUTION RESPIRATORY (INHALATION) at 14:03

## 2021-01-22 RX ADMIN — SODIUM CHLORIDE, PRESERVATIVE FREE 10 ML: 5 INJECTION INTRAVENOUS at 13:56

## 2021-01-22 RX ADMIN — IPRATROPIUM BROMIDE AND ALBUTEROL SULFATE 3 ML: 2.5; .5 SOLUTION RESPIRATORY (INHALATION) at 20:24

## 2021-01-22 RX ADMIN — ALBUMIN HUMAN 25 G: 0.25 SOLUTION INTRAVENOUS at 20:09

## 2021-01-22 RX ADMIN — ACETAMINOPHEN ORAL SOLUTION 500 MG: 650 SOLUTION ORAL at 13:56

## 2021-01-22 RX ADMIN — SENNOSIDES 10 ML: 8.8 LIQUID ORAL at 21:10

## 2021-01-22 RX ADMIN — DOCUSATE SODIUM 100 MG: 50 LIQUID ORAL at 21:10

## 2021-01-22 RX ADMIN — ALBUMIN HUMAN 25 G: 0.25 SOLUTION INTRAVENOUS at 09:35

## 2021-01-22 RX ADMIN — SERTRALINE 50 MG: 50 TABLET, FILM COATED ORAL at 13:55

## 2021-01-22 RX ADMIN — WATER: 1 INJECTION INTRAMUSCULAR; INTRAVENOUS; SUBCUTANEOUS at 15:20

## 2021-01-22 RX ADMIN — HEPARIN SODIUM 1800 UNITS: 1000 INJECTION INTRAVENOUS; SUBCUTANEOUS at 12:26

## 2021-01-22 RX ADMIN — VANCOMYCIN HYDROCHLORIDE 2000 MG: 100 INJECTION, POWDER, LYOPHILIZED, FOR SOLUTION INTRAVENOUS at 17:41

## 2021-01-22 RX ADMIN — TAZOBACTAM SODIUM AND PIPERACILLIN SODIUM 3.38 G: 375; 3 INJECTION, SOLUTION INTRAVENOUS at 15:17

## 2021-01-22 RX ADMIN — Medication 30 ML: at 15:30

## 2021-01-22 RX ADMIN — ATORVASTATIN CALCIUM 80 MG: 40 TABLET, FILM COATED ORAL at 21:10

## 2021-01-22 RX ADMIN — ALBUMIN HUMAN 25 G: 0.25 SOLUTION INTRAVENOUS at 11:14

## 2021-01-22 RX ADMIN — WARFARIN SODIUM 5 MG: 5 TABLET ORAL at 17:40

## 2021-01-22 RX ADMIN — GUAIFENESIN 400 MG: 100 SOLUTION ORAL at 17:41

## 2021-01-22 RX ADMIN — ASPIRIN 81 MG CHEWABLE TABLET 81 MG: 81 TABLET CHEWABLE at 13:55

## 2021-01-22 RX ADMIN — ACETAMINOPHEN ORAL SOLUTION 500 MG: 650 SOLUTION ORAL at 17:40

## 2021-01-22 RX ADMIN — EPOETIN ALFA-EPBX 10000 UNITS: 10000 INJECTION, SOLUTION INTRAVENOUS; SUBCUTANEOUS at 12:21

## 2021-01-22 RX ADMIN — MEROPENEM 1 G: 1 INJECTION, POWDER, FOR SOLUTION INTRAVENOUS at 20:09

## 2021-01-22 RX ADMIN — IPRATROPIUM BROMIDE AND ALBUTEROL SULFATE 3 ML: 2.5; .5 SOLUTION RESPIRATORY (INHALATION) at 22:55

## 2021-01-23 ENCOUNTER — APPOINTMENT (OUTPATIENT)
Dept: NEUROLOGY | Facility: HOSPITAL | Age: 71
End: 2021-01-23

## 2021-01-23 ENCOUNTER — APPOINTMENT (OUTPATIENT)
Dept: GENERAL RADIOLOGY | Facility: HOSPITAL | Age: 71
End: 2021-01-23

## 2021-01-23 PROBLEM — J96.22 ACUTE ON CHRONIC RESPIRATORY FAILURE WITH HYPOXIA AND HYPERCAPNIA (HCC): Status: ACTIVE | Noted: 2019-04-27

## 2021-01-23 PROBLEM — J96.21 ACUTE ON CHRONIC RESPIRATORY FAILURE WITH HYPOXIA AND HYPERCAPNIA (HCC): Status: ACTIVE | Noted: 2019-04-27

## 2021-01-23 LAB
ALBUMIN SERPL-MCNC: 4.5 G/DL (ref 3.5–5.2)
ALBUMIN/GLOB SERPL: 1.2 G/DL
ALP SERPL-CCNC: 103 U/L (ref 39–117)
ALT SERPL W P-5'-P-CCNC: <5 U/L (ref 1–33)
ANION GAP SERPL CALCULATED.3IONS-SCNC: 15 MMOL/L (ref 5–15)
APTT PPP: 43.3 SECONDS (ref 50–95)
ARTERIAL PATENCY WRIST A: ABNORMAL
ARTERIAL PATENCY WRIST A: ABNORMAL
AST SERPL-CCNC: 17 U/L (ref 1–32)
ATMOSPHERIC PRESS: ABNORMAL MM[HG]
ATMOSPHERIC PRESS: ABNORMAL MM[HG]
BASE EXCESS BLDA CALC-SCNC: -0.5 MMOL/L (ref 0–2)
BASE EXCESS BLDA CALC-SCNC: 0.8 MMOL/L (ref 0–2)
BASOPHILS # BLD AUTO: 0.04 10*3/MM3 (ref 0–0.2)
BASOPHILS # BLD AUTO: 0.04 10*3/MM3 (ref 0–0.2)
BASOPHILS NFR BLD AUTO: 0.3 % (ref 0–1.5)
BASOPHILS NFR BLD AUTO: 0.4 % (ref 0–1.5)
BDY SITE: ABNORMAL
BDY SITE: ABNORMAL
BILIRUB SERPL-MCNC: 0.5 MG/DL (ref 0–1.2)
BODY TEMPERATURE: 37 C
BODY TEMPERATURE: 37 C
BUN SERPL-MCNC: 35 MG/DL (ref 8–23)
BUN/CREAT SERPL: 13.6 (ref 7–25)
CALCIUM SPEC-SCNC: 9.6 MG/DL (ref 8.6–10.5)
CHLORIDE SERPL-SCNC: 97 MMOL/L (ref 98–107)
CO2 BLDA-SCNC: 24.9 MMOL/L (ref 22–33)
CO2 BLDA-SCNC: 25.3 MMOL/L (ref 22–33)
CO2 SERPL-SCNC: 22 MMOL/L (ref 22–29)
COHGB MFR BLD: 0.7 % (ref 0–2)
COHGB MFR BLD: 0.7 % (ref 0–2)
CREAT SERPL-MCNC: 2.58 MG/DL (ref 0.57–1)
D-LACTATE SERPL-SCNC: 1.5 MMOL/L (ref 0.5–2)
DEPRECATED RDW RBC AUTO: 66.9 FL (ref 37–54)
DEPRECATED RDW RBC AUTO: 71.2 FL (ref 37–54)
EOSINOPHIL # BLD AUTO: 0.45 10*3/MM3 (ref 0–0.4)
EOSINOPHIL # BLD AUTO: 0.49 10*3/MM3 (ref 0–0.4)
EOSINOPHIL NFR BLD AUTO: 4 % (ref 0.3–6.2)
EOSINOPHIL NFR BLD AUTO: 4.4 % (ref 0.3–6.2)
EPAP: 0
EPAP: 0
ERYTHROCYTE [DISTWIDTH] IN BLOOD BY AUTOMATED COUNT: 18.3 % (ref 12.3–15.4)
ERYTHROCYTE [DISTWIDTH] IN BLOOD BY AUTOMATED COUNT: 18.3 % (ref 12.3–15.4)
GFR SERPL CREATININE-BSD FRML MDRD: 22 ML/MIN/1.73
GLOBULIN UR ELPH-MCNC: 3.8 GM/DL
GLUCOSE BLDC GLUCOMTR-MCNC: 106 MG/DL (ref 70–130)
GLUCOSE BLDC GLUCOMTR-MCNC: 106 MG/DL (ref 70–130)
GLUCOSE BLDC GLUCOMTR-MCNC: 169 MG/DL (ref 70–130)
GLUCOSE BLDC GLUCOMTR-MCNC: 66 MG/DL (ref 70–130)
GLUCOSE BLDC GLUCOMTR-MCNC: 86 MG/DL (ref 70–130)
GLUCOSE BLDC GLUCOMTR-MCNC: 91 MG/DL (ref 70–130)
GLUCOSE SERPL-MCNC: 86 MG/DL (ref 65–99)
HCO3 BLDA-SCNC: 24 MMOL/L (ref 20–26)
HCO3 BLDA-SCNC: 24.1 MMOL/L (ref 20–26)
HCT VFR BLD AUTO: 28.9 % (ref 34–46.6)
HCT VFR BLD AUTO: 30.4 % (ref 34–46.6)
HCT VFR BLD CALC: 25.2 %
HCT VFR BLD CALC: 25.8 %
HGB BLD-MCNC: 8.5 G/DL (ref 12–15.9)
HGB BLD-MCNC: 8.6 G/DL (ref 12–15.9)
HGB BLDA-MCNC: 8.2 G/DL (ref 14–18)
HGB BLDA-MCNC: 8.4 G/DL (ref 14–18)
IMM GRANULOCYTES # BLD AUTO: 0.08 10*3/MM3 (ref 0–0.05)
IMM GRANULOCYTES # BLD AUTO: 0.11 10*3/MM3 (ref 0–0.05)
IMM GRANULOCYTES NFR BLD AUTO: 0.7 % (ref 0–0.5)
IMM GRANULOCYTES NFR BLD AUTO: 1.1 % (ref 0–0.5)
INHALED O2 CONCENTRATION: 100 %
INHALED O2 CONCENTRATION: 50 %
INR PPP: 1.61 (ref 0.85–1.16)
IPAP: 0
IPAP: 0
LYMPHOCYTES # BLD AUTO: 1.22 10*3/MM3 (ref 0.7–3.1)
LYMPHOCYTES # BLD AUTO: 1.31 10*3/MM3 (ref 0.7–3.1)
LYMPHOCYTES NFR BLD AUTO: 10.8 % (ref 19.6–45.3)
LYMPHOCYTES NFR BLD AUTO: 11.8 % (ref 19.6–45.3)
MACROCYTES BLD QL SMEAR: NORMAL
MAGNESIUM SERPL-MCNC: 2 MG/DL (ref 1.6–2.4)
MCH RBC QN AUTO: 30.1 PG (ref 26.6–33)
MCH RBC QN AUTO: 30.6 PG (ref 26.6–33)
MCHC RBC AUTO-ENTMCNC: 28.3 G/DL (ref 31.5–35.7)
MCHC RBC AUTO-ENTMCNC: 29.4 G/DL (ref 31.5–35.7)
MCV RBC AUTO: 102.5 FL (ref 79–97)
MCV RBC AUTO: 108.2 FL (ref 79–97)
METHGB BLD QL: 0.7 % (ref 0–1.5)
METHGB BLD QL: 0.8 % (ref 0–1.5)
MODALITY: ABNORMAL
MODALITY: ABNORMAL
MONOCYTES # BLD AUTO: 0.83 10*3/MM3 (ref 0.1–0.9)
MONOCYTES # BLD AUTO: 0.9 10*3/MM3 (ref 0.1–0.9)
MONOCYTES NFR BLD AUTO: 7.4 % (ref 5–12)
MONOCYTES NFR BLD AUTO: 8.1 % (ref 5–12)
NEUTROPHILS NFR BLD AUTO: 7.65 10*3/MM3 (ref 1.7–7)
NEUTROPHILS NFR BLD AUTO: 74.2 % (ref 42.7–76)
NEUTROPHILS NFR BLD AUTO: 76.8 % (ref 42.7–76)
NEUTROPHILS NFR BLD AUTO: 9.29 10*3/MM3 (ref 1.7–7)
NOTE: ABNORMAL
NOTE: ABNORMAL
NRBC BLD AUTO-RTO: 0.7 /100 WBC (ref 0–0.2)
NRBC BLD AUTO-RTO: 1.3 /100 WBC (ref 0–0.2)
OXYHGB MFR BLDV: 98.5 % (ref 94–99)
OXYHGB MFR BLDV: 98.7 % (ref 94–99)
PAW @ PEAK INSP FLOW SETTING VENT: 0 CMH2O
PAW @ PEAK INSP FLOW SETTING VENT: 0 CMH2O
PCO2 BLDA: 31.7 MM HG (ref 35–45)
PCO2 BLDA: 38.5 MM HG (ref 35–45)
PCO2 TEMP ADJ BLD: 31.7 MM HG (ref 35–45)
PCO2 TEMP ADJ BLD: 38.5 MM HG (ref 35–45)
PH BLDA: 7.41 PH UNITS (ref 7.35–7.45)
PH BLDA: 7.49 PH UNITS (ref 7.35–7.45)
PH, TEMP CORRECTED: 7.41 PH UNITS
PH, TEMP CORRECTED: 7.49 PH UNITS
PHOSPHATE SERPL-MCNC: 1.4 MG/DL (ref 2.5–4.5)
PHOSPHATE SERPL-MCNC: 1.6 MG/DL (ref 2.5–4.5)
PLAT MORPH BLD: NORMAL
PLATELET # BLD AUTO: 205 10*3/MM3 (ref 140–450)
PLATELET # BLD AUTO: 222 10*3/MM3 (ref 140–450)
PMV BLD AUTO: 10.1 FL (ref 6–12)
PMV BLD AUTO: 10.6 FL (ref 6–12)
PO2 BLDA: 207 MM HG (ref 83–108)
PO2 BLDA: 416 MM HG (ref 83–108)
PO2 TEMP ADJ BLD: 207 MM HG (ref 83–108)
PO2 TEMP ADJ BLD: 416 MM HG (ref 83–108)
POTASSIUM SERPL-SCNC: 3.9 MMOL/L (ref 3.5–5.2)
PROCALCITONIN SERPL-MCNC: 3.84 NG/ML (ref 0–0.25)
PROT SERPL-MCNC: 8.3 G/DL (ref 6–8.5)
PROTHROMBIN TIME: 18.8 SECONDS (ref 11.5–14)
RBC # BLD AUTO: 2.81 10*6/MM3 (ref 3.77–5.28)
RBC # BLD AUTO: 2.82 10*6/MM3 (ref 3.77–5.28)
SODIUM SERPL-SCNC: 134 MMOL/L (ref 136–145)
TOTAL RATE: 0 BREATHS/MINUTE
TOTAL RATE: 0 BREATHS/MINUTE
UFH PPP CHRO-ACNC: 0.1 IU/ML (ref 0.3–0.7)
VANCOMYCIN SERPL-MCNC: 20 MCG/ML (ref 5–40)
WBC # BLD AUTO: 10.3 10*3/MM3 (ref 3.4–10.8)
WBC # BLD AUTO: 12.11 10*3/MM3 (ref 3.4–10.8)
WBC MORPH BLD: NORMAL

## 2021-01-23 PROCEDURE — 99291 CRITICAL CARE FIRST HOUR: CPT | Performed by: INTERNAL MEDICINE

## 2021-01-23 PROCEDURE — 85025 COMPLETE CBC W/AUTO DIFF WBC: CPT | Performed by: INTERNAL MEDICINE

## 2021-01-23 PROCEDURE — 83735 ASSAY OF MAGNESIUM: CPT | Performed by: INTERNAL MEDICINE

## 2021-01-23 PROCEDURE — 82962 GLUCOSE BLOOD TEST: CPT

## 2021-01-23 PROCEDURE — 85610 PROTHROMBIN TIME: CPT | Performed by: NURSE PRACTITIONER

## 2021-01-23 PROCEDURE — 84145 PROCALCITONIN (PCT): CPT | Performed by: INTERNAL MEDICINE

## 2021-01-23 PROCEDURE — 93005 ELECTROCARDIOGRAM TRACING: CPT | Performed by: NURSE PRACTITIONER

## 2021-01-23 PROCEDURE — 94799 UNLISTED PULMONARY SVC/PX: CPT

## 2021-01-23 PROCEDURE — 25010000002 MEROPENEM PER 100 MG: Performed by: INTERNAL MEDICINE

## 2021-01-23 PROCEDURE — 25010000002 FENTANYL CITRATE (PF) 2500 MCG/50ML SOLUTION: Performed by: NURSE PRACTITIONER

## 2021-01-23 PROCEDURE — 85730 THROMBOPLASTIN TIME PARTIAL: CPT | Performed by: INTERNAL MEDICINE

## 2021-01-23 PROCEDURE — 85520 HEPARIN ASSAY: CPT | Performed by: INTERNAL MEDICINE

## 2021-01-23 PROCEDURE — 87186 SC STD MICRODIL/AGAR DIL: CPT | Performed by: NURSE PRACTITIONER

## 2021-01-23 PROCEDURE — 71045 X-RAY EXAM CHEST 1 VIEW: CPT

## 2021-01-23 PROCEDURE — 95819 EEG AWAKE AND ASLEEP: CPT

## 2021-01-23 PROCEDURE — 83050 HGB METHEMOGLOBIN QUAN: CPT

## 2021-01-23 PROCEDURE — 80053 COMPREHEN METABOLIC PANEL: CPT | Performed by: HOSPITALIST

## 2021-01-23 PROCEDURE — 82805 BLOOD GASES W/O2 SATURATION: CPT

## 2021-01-23 PROCEDURE — 84100 ASSAY OF PHOSPHORUS: CPT | Performed by: INTERNAL MEDICINE

## 2021-01-23 PROCEDURE — 93010 ELECTROCARDIOGRAM REPORT: CPT | Performed by: INTERNAL MEDICINE

## 2021-01-23 PROCEDURE — 85025 COMPLETE CBC W/AUTO DIFF WBC: CPT | Performed by: HOSPITALIST

## 2021-01-23 PROCEDURE — 87205 SMEAR GRAM STAIN: CPT | Performed by: NURSE PRACTITIONER

## 2021-01-23 PROCEDURE — 87147 CULTURE TYPE IMMUNOLOGIC: CPT | Performed by: NURSE PRACTITIONER

## 2021-01-23 PROCEDURE — 25010000002 HEPARIN (PORCINE) 25000-0.45 UT/250ML-% SOLUTION

## 2021-01-23 PROCEDURE — 80202 ASSAY OF VANCOMYCIN: CPT

## 2021-01-23 PROCEDURE — 31500 INSERT EMERGENCY AIRWAY: CPT

## 2021-01-23 PROCEDURE — 87070 CULTURE OTHR SPECIMN AEROBIC: CPT | Performed by: NURSE PRACTITIONER

## 2021-01-23 PROCEDURE — 85007 BL SMEAR W/DIFF WBC COUNT: CPT | Performed by: HOSPITALIST

## 2021-01-23 PROCEDURE — 36600 WITHDRAWAL OF ARTERIAL BLOOD: CPT

## 2021-01-23 PROCEDURE — 82375 ASSAY CARBOXYHB QUANT: CPT

## 2021-01-23 PROCEDURE — 84100 ASSAY OF PHOSPHORUS: CPT | Performed by: NURSE PRACTITIONER

## 2021-01-23 PROCEDURE — 99232 SBSQ HOSP IP/OBS MODERATE 35: CPT | Performed by: PSYCHIATRY & NEUROLOGY

## 2021-01-23 PROCEDURE — 94002 VENT MGMT INPAT INIT DAY: CPT

## 2021-01-23 PROCEDURE — 83605 ASSAY OF LACTIC ACID: CPT | Performed by: INTERNAL MEDICINE

## 2021-01-23 RX ORDER — IPRATROPIUM BROMIDE AND ALBUTEROL SULFATE 2.5; .5 MG/3ML; MG/3ML
3 SOLUTION RESPIRATORY (INHALATION)
Status: DISCONTINUED | OUTPATIENT
Start: 2021-01-23 | End: 2021-02-03

## 2021-01-23 RX ORDER — HEPARIN SODIUM 1000 [USP'U]/ML
40 INJECTION, SOLUTION INTRAVENOUS; SUBCUTANEOUS AS NEEDED
Status: DISCONTINUED | OUTPATIENT
Start: 2021-01-23 | End: 2021-01-23

## 2021-01-23 RX ORDER — HEPARIN SODIUM 1000 [USP'U]/ML
10000 INJECTION, SOLUTION INTRAVENOUS; SUBCUTANEOUS AS NEEDED
Status: DISCONTINUED | OUTPATIENT
Start: 2021-01-23 | End: 2021-01-23

## 2021-01-23 RX ORDER — CHLORHEXIDINE GLUCONATE 0.12 MG/ML
15 RINSE ORAL EVERY 12 HOURS SCHEDULED
Status: DISCONTINUED | OUTPATIENT
Start: 2021-01-23 | End: 2021-02-17 | Stop reason: HOSPADM

## 2021-01-23 RX ORDER — KETAMINE HCL IN NACL, ISO-OSM 100MG/10ML
SYRINGE (ML) INJECTION
Status: COMPLETED
Start: 2021-01-23 | End: 2021-01-23

## 2021-01-23 RX ORDER — SERTRALINE HYDROCHLORIDE 25 MG/1
25 TABLET, FILM COATED ORAL DAILY
Status: DISCONTINUED | OUTPATIENT
Start: 2021-01-23 | End: 2021-01-28

## 2021-01-23 RX ORDER — HEPARIN SODIUM 10000 [USP'U]/100ML
1500 INJECTION, SOLUTION INTRAVENOUS
Status: DISCONTINUED | OUTPATIENT
Start: 2021-01-23 | End: 2021-01-23

## 2021-01-23 RX ORDER — HEPARIN SODIUM 10000 [USP'U]/100ML
14 INJECTION, SOLUTION INTRAVENOUS
Status: DISPENSED | OUTPATIENT
Start: 2021-01-23 | End: 2021-02-02

## 2021-01-23 RX ORDER — ROCURONIUM BROMIDE 10 MG/ML
150 INJECTION, SOLUTION INTRAVENOUS ONCE
Status: COMPLETED | OUTPATIENT
Start: 2021-01-23 | End: 2021-01-23

## 2021-01-23 RX ORDER — CHLORHEXIDINE GLUCONATE 0.12 MG/ML
15 RINSE ORAL EVERY 12 HOURS SCHEDULED
Status: DISCONTINUED | OUTPATIENT
Start: 2021-01-23 | End: 2021-01-23

## 2021-01-23 RX ORDER — KETAMINE HCL IN NACL, ISO-OSM 100MG/10ML
180 SYRINGE (ML) INJECTION ONCE
Status: COMPLETED | OUTPATIENT
Start: 2021-01-23 | End: 2021-01-23

## 2021-01-23 RX ADMIN — ROCURONIUM BROMIDE 150 MG: 10 INJECTION INTRAVENOUS at 00:15

## 2021-01-23 RX ADMIN — ASPIRIN 81 MG CHEWABLE TABLET 81 MG: 81 TABLET CHEWABLE at 10:58

## 2021-01-23 RX ADMIN — MEROPENEM 500 MG: 500 INJECTION, POWDER, FOR SOLUTION INTRAVENOUS at 10:58

## 2021-01-23 RX ADMIN — SODIUM CHLORIDE, PRESERVATIVE FREE 10 ML: 5 INJECTION INTRAVENOUS at 20:59

## 2021-01-23 RX ADMIN — FENTANYL CITRATE 150 MCG/HR: 0.05 INJECTION, SOLUTION INTRAMUSCULAR; INTRAVENOUS at 18:29

## 2021-01-23 RX ADMIN — Medication 150 MG: at 00:10

## 2021-01-23 RX ADMIN — IPRATROPIUM BROMIDE AND ALBUTEROL SULFATE 3 ML: 2.5; .5 SOLUTION RESPIRATORY (INHALATION) at 04:15

## 2021-01-23 RX ADMIN — FENTANYL CITRATE 50 MCG/HR: 0.05 INJECTION, SOLUTION INTRAMUSCULAR; INTRAVENOUS at 00:30

## 2021-01-23 RX ADMIN — SENNOSIDES 10 ML: 8.8 LIQUID ORAL at 10:58

## 2021-01-23 RX ADMIN — DEXTROSE MONOHYDRATE 25 ML: 25 INJECTION, SOLUTION INTRAVENOUS at 06:26

## 2021-01-23 RX ADMIN — IPRATROPIUM BROMIDE AND ALBUTEROL SULFATE 3 ML: 2.5; .5 SOLUTION RESPIRATORY (INHALATION) at 07:21

## 2021-01-23 RX ADMIN — SODIUM PHOSPHATE, MONOBASIC, MONOHYDRATE AND SODIUM PHOSPHATE, DIBASIC, ANHYDROUS 20 MMOL: 276; 142 INJECTION, SOLUTION INTRAVENOUS at 10:58

## 2021-01-23 RX ADMIN — SODIUM CHLORIDE 500 ML: 9 INJECTION, SOLUTION INTRAVENOUS at 00:10

## 2021-01-23 RX ADMIN — IPRATROPIUM BROMIDE AND ALBUTEROL SULFATE 3 ML: 2.5; .5 SOLUTION RESPIRATORY (INHALATION) at 19:13

## 2021-01-23 RX ADMIN — SENNOSIDES 10 ML: 8.8 LIQUID ORAL at 20:58

## 2021-01-23 RX ADMIN — SERTRALINE HYDROCHLORIDE 25 MG: 25 TABLET ORAL at 10:58

## 2021-01-23 RX ADMIN — DOCUSATE SODIUM 100 MG: 50 LIQUID ORAL at 11:12

## 2021-01-23 RX ADMIN — LANSOPRAZOLE 30 MG: KIT at 06:26

## 2021-01-23 RX ADMIN — IPRATROPIUM BROMIDE AND ALBUTEROL SULFATE 3 ML: 2.5; .5 SOLUTION RESPIRATORY (INHALATION) at 15:45

## 2021-01-23 RX ADMIN — CHLORHEXIDINE GLUCONATE 0.12% ORAL RINSE 15 ML: 1.2 LIQUID ORAL at 10:58

## 2021-01-23 RX ADMIN — ATORVASTATIN CALCIUM 80 MG: 40 TABLET, FILM COATED ORAL at 20:58

## 2021-01-23 RX ADMIN — DOCUSATE SODIUM 100 MG: 50 LIQUID ORAL at 20:58

## 2021-01-23 RX ADMIN — HEPARIN SODIUM 6 UNITS/KG/HR: 10000 INJECTION, SOLUTION INTRAVENOUS at 18:07

## 2021-01-23 RX ADMIN — CHLORHEXIDINE GLUCONATE 0.12% ORAL RINSE 15 ML: 1.2 LIQUID ORAL at 20:58

## 2021-01-24 ENCOUNTER — APPOINTMENT (OUTPATIENT)
Dept: GENERAL RADIOLOGY | Facility: HOSPITAL | Age: 71
End: 2021-01-24

## 2021-01-24 LAB
ALBUMIN SERPL-MCNC: 4 G/DL (ref 3.5–5.2)
ANION GAP SERPL CALCULATED.3IONS-SCNC: 15 MMOL/L (ref 5–15)
ARTERIAL PATENCY WRIST A: ABNORMAL
ATMOSPHERIC PRESS: ABNORMAL MM[HG]
BASE EXCESS BLDA CALC-SCNC: -2.4 MMOL/L (ref 0–2)
BASOPHILS # BLD AUTO: 0.04 10*3/MM3 (ref 0–0.2)
BASOPHILS NFR BLD AUTO: 0.4 % (ref 0–1.5)
BDY SITE: ABNORMAL
BODY TEMPERATURE: 37 C
BUN SERPL-MCNC: 53 MG/DL (ref 8–23)
BUN/CREAT SERPL: 15.8 (ref 7–25)
CALCIUM SPEC-SCNC: 9.3 MG/DL (ref 8.6–10.5)
CHLORIDE SERPL-SCNC: 94 MMOL/L (ref 98–107)
CO2 BLDA-SCNC: 24.3 MMOL/L (ref 22–33)
CO2 SERPL-SCNC: 21 MMOL/L (ref 22–29)
COHGB MFR BLD: 0.9 % (ref 0–2)
CREAT SERPL-MCNC: 3.36 MG/DL (ref 0.57–1)
DEPRECATED RDW RBC AUTO: 66.5 FL (ref 37–54)
EOSINOPHIL # BLD AUTO: 0.51 10*3/MM3 (ref 0–0.4)
EOSINOPHIL NFR BLD AUTO: 4.9 % (ref 0.3–6.2)
EPAP: 0
ERYTHROCYTE [DISTWIDTH] IN BLOOD BY AUTOMATED COUNT: 18.4 % (ref 12.3–15.4)
GFR SERPL CREATININE-BSD FRML MDRD: 16 ML/MIN/1.73
GLUCOSE BLDC GLUCOMTR-MCNC: 136 MG/DL (ref 70–130)
GLUCOSE BLDC GLUCOMTR-MCNC: 149 MG/DL (ref 70–130)
GLUCOSE BLDC GLUCOMTR-MCNC: 154 MG/DL (ref 70–130)
GLUCOSE BLDC GLUCOMTR-MCNC: 158 MG/DL (ref 70–130)
GLUCOSE SERPL-MCNC: 165 MG/DL (ref 65–99)
HCO3 BLDA-SCNC: 23 MMOL/L (ref 20–26)
HCT VFR BLD AUTO: 28.3 % (ref 34–46.6)
HCT VFR BLD CALC: 27.1 %
HGB BLD-MCNC: 8.1 G/DL (ref 12–15.9)
HGB BLDA-MCNC: 8.8 G/DL (ref 14–18)
IMM GRANULOCYTES # BLD AUTO: 0.1 10*3/MM3 (ref 0–0.05)
IMM GRANULOCYTES NFR BLD AUTO: 1 % (ref 0–0.5)
INHALED O2 CONCENTRATION: 40 %
IPAP: 0
LYMPHOCYTES # BLD AUTO: 1.22 10*3/MM3 (ref 0.7–3.1)
LYMPHOCYTES NFR BLD AUTO: 11.7 % (ref 19.6–45.3)
MAGNESIUM SERPL-MCNC: 1.8 MG/DL (ref 1.6–2.4)
MCH RBC QN AUTO: 29.1 PG (ref 26.6–33)
MCHC RBC AUTO-ENTMCNC: 28.6 G/DL (ref 31.5–35.7)
MCV RBC AUTO: 101.8 FL (ref 79–97)
METHGB BLD QL: 0.9 % (ref 0–1.5)
MODALITY: ABNORMAL
MONOCYTES # BLD AUTO: 0.73 10*3/MM3 (ref 0.1–0.9)
MONOCYTES NFR BLD AUTO: 7 % (ref 5–12)
NEUTROPHILS NFR BLD AUTO: 7.83 10*3/MM3 (ref 1.7–7)
NEUTROPHILS NFR BLD AUTO: 75 % (ref 42.7–76)
NOTE: ABNORMAL
NRBC BLD AUTO-RTO: 0.5 /100 WBC (ref 0–0.2)
OXYHGB MFR BLDV: 95.6 % (ref 94–99)
PAW @ PEAK INSP FLOW SETTING VENT: 0 CMH2O
PCO2 BLDA: 41.5 MM HG (ref 35–45)
PCO2 TEMP ADJ BLD: 41.5 MM HG (ref 35–45)
PH BLDA: 7.35 PH UNITS (ref 7.35–7.45)
PH, TEMP CORRECTED: 7.35 PH UNITS
PHOSPHATE SERPL-MCNC: 2.1 MG/DL (ref 2.5–4.5)
PHOSPHATE SERPL-MCNC: 2.1 MG/DL (ref 2.5–4.5)
PLATELET # BLD AUTO: 230 10*3/MM3 (ref 140–450)
PMV BLD AUTO: 10.2 FL (ref 6–12)
PO2 BLDA: 87.7 MM HG (ref 83–108)
PO2 TEMP ADJ BLD: 87.7 MM HG (ref 83–108)
POTASSIUM SERPL-SCNC: 2.9 MMOL/L (ref 3.5–5.2)
RBC # BLD AUTO: 2.78 10*6/MM3 (ref 3.77–5.28)
SODIUM SERPL-SCNC: 130 MMOL/L (ref 136–145)
TOTAL RATE: 0 BREATHS/MINUTE
UFH PPP CHRO-ACNC: 0.1 IU/ML (ref 0.3–0.7)
UFH PPP CHRO-ACNC: 0.4 IU/ML (ref 0.3–0.7)
UFH PPP CHRO-ACNC: 0.97 IU/ML (ref 0.3–0.7)
WBC # BLD AUTO: 10.43 10*3/MM3 (ref 3.4–10.8)

## 2021-01-24 PROCEDURE — 82805 BLOOD GASES W/O2 SATURATION: CPT

## 2021-01-24 PROCEDURE — 82962 GLUCOSE BLOOD TEST: CPT

## 2021-01-24 PROCEDURE — 25010000003 POTASSIUM CHLORIDE PER 2 MEQ: Performed by: INTERNAL MEDICINE

## 2021-01-24 PROCEDURE — 94799 UNLISTED PULMONARY SVC/PX: CPT

## 2021-01-24 PROCEDURE — 71045 X-RAY EXAM CHEST 1 VIEW: CPT

## 2021-01-24 PROCEDURE — 85520 HEPARIN ASSAY: CPT

## 2021-01-24 PROCEDURE — 25010000002 MEROPENEM PER 100 MG

## 2021-01-24 PROCEDURE — 63710000001 INSULIN DETEMIR PER 5 UNITS: Performed by: INTERNAL MEDICINE

## 2021-01-24 PROCEDURE — 80069 RENAL FUNCTION PANEL: CPT | Performed by: INTERNAL MEDICINE

## 2021-01-24 PROCEDURE — 85025 COMPLETE CBC W/AUTO DIFF WBC: CPT | Performed by: INTERNAL MEDICINE

## 2021-01-24 PROCEDURE — 82375 ASSAY CARBOXYHB QUANT: CPT

## 2021-01-24 PROCEDURE — 83735 ASSAY OF MAGNESIUM: CPT | Performed by: INTERNAL MEDICINE

## 2021-01-24 PROCEDURE — 25010000002 HEPARIN (PORCINE) 25000-0.45 UT/250ML-% SOLUTION

## 2021-01-24 PROCEDURE — 83050 HGB METHEMOGLOBIN QUAN: CPT

## 2021-01-24 PROCEDURE — 25010000002 FENTANYL CITRATE (PF) 2500 MCG/50ML SOLUTION: Performed by: NURSE PRACTITIONER

## 2021-01-24 PROCEDURE — 25010000002 HEPARIN (PORCINE) PER 1000 UNITS

## 2021-01-24 PROCEDURE — 36600 WITHDRAWAL OF ARTERIAL BLOOD: CPT

## 2021-01-24 PROCEDURE — 99233 SBSQ HOSP IP/OBS HIGH 50: CPT | Performed by: INTERNAL MEDICINE

## 2021-01-24 PROCEDURE — 84100 ASSAY OF PHOSPHORUS: CPT | Performed by: NURSE PRACTITIONER

## 2021-01-24 PROCEDURE — 63710000001 INSULIN REGULAR HUMAN PER 5 UNITS: Performed by: NURSE PRACTITIONER

## 2021-01-24 PROCEDURE — 25010000002 MAGNESIUM SULFATE 2 GM/50ML SOLUTION: Performed by: INTERNAL MEDICINE

## 2021-01-24 PROCEDURE — 94003 VENT MGMT INPAT SUBQ DAY: CPT

## 2021-01-24 RX ORDER — HEPARIN SODIUM 1000 [USP'U]/ML
9000 INJECTION, SOLUTION INTRAVENOUS; SUBCUTANEOUS ONCE
Status: COMPLETED | OUTPATIENT
Start: 2021-01-24 | End: 2021-01-24

## 2021-01-24 RX ORDER — POTASSIUM CHLORIDE 1.5 G/1.77G
40 POWDER, FOR SOLUTION ORAL 2 TIMES DAILY
Status: DISCONTINUED | OUTPATIENT
Start: 2021-01-24 | End: 2021-01-27

## 2021-01-24 RX ORDER — POTASSIUM CHLORIDE 1.5 G/1.77G
40 POWDER, FOR SOLUTION ORAL 2 TIMES DAILY
Status: DISCONTINUED | OUTPATIENT
Start: 2021-01-24 | End: 2021-01-24

## 2021-01-24 RX ORDER — POTASSIUM CHLORIDE 29.8 MG/ML
20 INJECTION INTRAVENOUS
Status: DISPENSED | OUTPATIENT
Start: 2021-01-24 | End: 2021-01-24

## 2021-01-24 RX ADMIN — ATORVASTATIN CALCIUM 80 MG: 40 TABLET, FILM COATED ORAL at 20:39

## 2021-01-24 RX ADMIN — CHLORHEXIDINE GLUCONATE 0.12% ORAL RINSE 15 ML: 1.2 LIQUID ORAL at 20:39

## 2021-01-24 RX ADMIN — POTASSIUM CHLORIDE 20 MEQ: 2 INJECTION, SOLUTION, CONCENTRATE INTRAVENOUS at 12:04

## 2021-01-24 RX ADMIN — CHLORHEXIDINE GLUCONATE 0.12% ORAL RINSE 15 ML: 1.2 LIQUID ORAL at 09:30

## 2021-01-24 RX ADMIN — INSULIN HUMAN 3 UNITS: 100 INJECTION, SOLUTION PARENTERAL at 01:11

## 2021-01-24 RX ADMIN — DOCUSATE SODIUM 100 MG: 50 LIQUID ORAL at 20:39

## 2021-01-24 RX ADMIN — ASPIRIN 81 MG CHEWABLE TABLET 81 MG: 81 TABLET CHEWABLE at 09:29

## 2021-01-24 RX ADMIN — FENTANYL CITRATE 200 MCG/HR: 0.05 INJECTION, SOLUTION INTRAMUSCULAR; INTRAVENOUS at 09:30

## 2021-01-24 RX ADMIN — MEROPENEM 500 MG: 500 INJECTION, POWDER, FOR SOLUTION INTRAVENOUS at 12:04

## 2021-01-24 RX ADMIN — LANSOPRAZOLE 30 MG: KIT at 06:14

## 2021-01-24 RX ADMIN — INSULIN DETEMIR 15 UNITS: 100 INJECTION, SOLUTION SUBCUTANEOUS at 09:30

## 2021-01-24 RX ADMIN — SODIUM CHLORIDE, PRESERVATIVE FREE 10 ML: 5 INJECTION INTRAVENOUS at 12:06

## 2021-01-24 RX ADMIN — IPRATROPIUM BROMIDE AND ALBUTEROL SULFATE 3 ML: 2.5; .5 SOLUTION RESPIRATORY (INHALATION) at 00:15

## 2021-01-24 RX ADMIN — MINERAL OIL 10 ML: 1000 LIQUID ORAL at 15:01

## 2021-01-24 RX ADMIN — SODIUM CHLORIDE, PRESERVATIVE FREE 10 ML: 5 INJECTION INTRAVENOUS at 20:40

## 2021-01-24 RX ADMIN — SENNOSIDES 10 ML: 8.8 LIQUID ORAL at 20:39

## 2021-01-24 RX ADMIN — IPRATROPIUM BROMIDE AND ALBUTEROL SULFATE 3 ML: 2.5; .5 SOLUTION RESPIRATORY (INHALATION) at 07:36

## 2021-01-24 RX ADMIN — POTASSIUM CHLORIDE 40 MEQ: 1.5 POWDER, FOR SOLUTION ORAL at 20:39

## 2021-01-24 RX ADMIN — SERTRALINE HYDROCHLORIDE 25 MG: 25 TABLET ORAL at 09:29

## 2021-01-24 RX ADMIN — TERAZOSIN HYDROCHLORIDE 1 MG: 1 CAPSULE ORAL at 20:39

## 2021-01-24 RX ADMIN — POTASSIUM CHLORIDE 40 MEQ: 1.5 POWDER, FOR SOLUTION ORAL at 04:31

## 2021-01-24 RX ADMIN — SENNOSIDES 10 ML: 8.8 LIQUID ORAL at 09:30

## 2021-01-24 RX ADMIN — MAGNESIUM SULFATE HEPTAHYDRATE 2 G: 2 INJECTION, SOLUTION INTRAVENOUS at 04:45

## 2021-01-24 RX ADMIN — POTASSIUM CHLORIDE 20 MEQ: 2 INJECTION, SOLUTION, CONCENTRATE INTRAVENOUS at 11:30

## 2021-01-24 RX ADMIN — HEPARIN SODIUM 9000 UNITS: 1000 INJECTION INTRAVENOUS; SUBCUTANEOUS at 04:20

## 2021-01-24 RX ADMIN — INSULIN HUMAN 2 UNITS: 100 INJECTION, SOLUTION PARENTERAL at 12:05

## 2021-01-24 RX ADMIN — DOCUSATE SODIUM 100 MG: 50 LIQUID ORAL at 09:30

## 2021-01-24 RX ADMIN — IPRATROPIUM BROMIDE AND ALBUTEROL SULFATE 3 ML: 2.5; .5 SOLUTION RESPIRATORY (INHALATION) at 19:13

## 2021-01-24 RX ADMIN — IPRATROPIUM BROMIDE AND ALBUTEROL SULFATE 3 ML: 2.5; .5 SOLUTION RESPIRATORY (INHALATION) at 13:09

## 2021-01-24 RX ADMIN — SODIUM PHOSPHATE, MONOBASIC, MONOHYDRATE AND SODIUM PHOSPHATE, DIBASIC, ANHYDROUS 15 MMOL: 276; 142 INJECTION, SOLUTION INTRAVENOUS at 12:04

## 2021-01-24 RX ADMIN — FENTANYL CITRATE 250 MCG/HR: 0.05 INJECTION, SOLUTION INTRAMUSCULAR; INTRAVENOUS at 20:43

## 2021-01-24 RX ADMIN — HEPARIN SODIUM 8 UNITS/KG/HR: 10000 INJECTION, SOLUTION INTRAVENOUS at 15:01

## 2021-01-24 RX ADMIN — CARVEDILOL 25 MG: 12.5 TABLET, FILM COATED ORAL at 09:29

## 2021-01-25 ENCOUNTER — APPOINTMENT (OUTPATIENT)
Dept: GENERAL RADIOLOGY | Facility: HOSPITAL | Age: 71
End: 2021-01-25

## 2021-01-25 ENCOUNTER — APPOINTMENT (OUTPATIENT)
Dept: NEPHROLOGY | Facility: HOSPITAL | Age: 71
End: 2021-01-25

## 2021-01-25 LAB
ALBUMIN SERPL-MCNC: 3.6 G/DL (ref 3.5–5.2)
ALP SERPL-CCNC: 104 U/L (ref 39–117)
ALT SERPL W P-5'-P-CCNC: <5 U/L (ref 1–33)
ANION GAP SERPL CALCULATED.3IONS-SCNC: 15 MMOL/L (ref 5–15)
ARTERIAL PATENCY WRIST A: ABNORMAL
AST SERPL-CCNC: 14 U/L (ref 1–32)
ATMOSPHERIC PRESS: ABNORMAL MM[HG]
BACTERIA SPEC RESP CULT: ABNORMAL
BACTERIA SPEC RESP CULT: ABNORMAL
BASE EXCESS BLDA CALC-SCNC: -5.2 MMOL/L (ref 0–2)
BASOPHILS # BLD AUTO: 0.06 10*3/MM3 (ref 0–0.2)
BASOPHILS NFR BLD AUTO: 0.6 % (ref 0–1.5)
BDY SITE: ABNORMAL
BILIRUB SERPL-MCNC: 0.5 MG/DL (ref 0–1.2)
BODY TEMPERATURE: 37 C
BUN SERPL-MCNC: 68 MG/DL (ref 8–23)
CALCIUM SPEC-SCNC: 9.6 MG/DL (ref 8.6–10.5)
CHLORIDE SERPL-SCNC: 98 MMOL/L (ref 98–107)
CHOLEST SERPL-MCNC: 56 MG/DL (ref 0–200)
CO2 BLDA-SCNC: 22 MMOL/L (ref 22–33)
CO2 SERPL-SCNC: 20 MMOL/L (ref 22–29)
COHGB MFR BLD: 1 % (ref 0–2)
CREAT SERPL-MCNC: 4.52 MG/DL (ref 0.57–1)
CRP SERPL-MCNC: 7.17 MG/DL (ref 0–0.5)
DEPRECATED RDW RBC AUTO: 66.5 FL (ref 37–54)
EOSINOPHIL # BLD AUTO: 0.64 10*3/MM3 (ref 0–0.4)
EOSINOPHIL NFR BLD AUTO: 5.9 % (ref 0.3–6.2)
EPAP: 0
ERYTHROCYTE [DISTWIDTH] IN BLOOD BY AUTOMATED COUNT: 18.6 % (ref 12.3–15.4)
GLUCOSE BLDC GLUCOMTR-MCNC: 104 MG/DL (ref 70–130)
GLUCOSE BLDC GLUCOMTR-MCNC: 110 MG/DL (ref 70–130)
GLUCOSE BLDC GLUCOMTR-MCNC: 131 MG/DL (ref 70–130)
GLUCOSE SERPL-MCNC: 124 MG/DL (ref 65–99)
GRAM STN SPEC: ABNORMAL
HCO3 BLDA-SCNC: 20.8 MMOL/L (ref 20–26)
HCT VFR BLD AUTO: 27.3 % (ref 34–46.6)
HCT VFR BLD CALC: 26.5 %
HGB BLD-MCNC: 7.9 G/DL (ref 12–15.9)
HGB BLDA-MCNC: 8.6 G/DL (ref 14–18)
IMM GRANULOCYTES # BLD AUTO: 0.11 10*3/MM3 (ref 0–0.05)
IMM GRANULOCYTES NFR BLD AUTO: 1 % (ref 0–0.5)
INHALED O2 CONCENTRATION: 30 %
IPAP: 0
LYMPHOCYTES # BLD AUTO: 1.72 10*3/MM3 (ref 0.7–3.1)
LYMPHOCYTES NFR BLD AUTO: 15.8 % (ref 19.6–45.3)
MAGNESIUM SERPL-MCNC: 2.1 MG/DL (ref 1.6–2.4)
MCH RBC QN AUTO: 28.9 PG (ref 26.6–33)
MCHC RBC AUTO-ENTMCNC: 28.9 G/DL (ref 31.5–35.7)
MCV RBC AUTO: 100 FL (ref 79–97)
METHGB BLD QL: -0.1 % (ref 0–1.5)
MODALITY: ABNORMAL
MONOCYTES # BLD AUTO: 0.72 10*3/MM3 (ref 0.1–0.9)
MONOCYTES NFR BLD AUTO: 6.6 % (ref 5–12)
NEUTROPHILS NFR BLD AUTO: 7.65 10*3/MM3 (ref 1.7–7)
NEUTROPHILS NFR BLD AUTO: 70.1 % (ref 42.7–76)
NOTE: ABNORMAL
NRBC BLD AUTO-RTO: 0.2 /100 WBC (ref 0–0.2)
OXYHGB MFR BLDV: 97 % (ref 94–99)
PAW @ PEAK INSP FLOW SETTING VENT: 0 CMH2O
PCO2 BLDA: 41.8 MM HG (ref 35–45)
PCO2 TEMP ADJ BLD: 41.8 MM HG (ref 35–45)
PH BLDA: 7.3 PH UNITS (ref 7.35–7.45)
PH, TEMP CORRECTED: 7.3 PH UNITS
PHOSPHATE SERPL-MCNC: 2.8 MG/DL (ref 2.5–4.5)
PLATELET # BLD AUTO: 244 10*3/MM3 (ref 140–450)
PMV BLD AUTO: 10.4 FL (ref 6–12)
PO2 BLDA: 99.9 MM HG (ref 83–108)
PO2 TEMP ADJ BLD: 99.9 MM HG (ref 83–108)
POTASSIUM SERPL-SCNC: 4.9 MMOL/L (ref 3.5–5.2)
PREALB SERPL-MCNC: 20.4 MG/DL (ref 20–40)
PROT SERPL-MCNC: 7.6 G/DL (ref 6–8.5)
QT INTERVAL: 406 MS
QTC INTERVAL: 429 MS
RBC # BLD AUTO: 2.73 10*6/MM3 (ref 3.77–5.28)
SODIUM SERPL-SCNC: 133 MMOL/L (ref 136–145)
TOTAL RATE: 0 BREATHS/MINUTE
TRIGL SERPL-MCNC: 98 MG/DL (ref 0–150)
UFH PPP CHRO-ACNC: 0.25 IU/ML (ref 0.3–0.7)
UFH PPP CHRO-ACNC: 0.42 IU/ML (ref 0.3–0.7)
UFH PPP CHRO-ACNC: 0.68 IU/ML (ref 0.3–0.7)
WBC # BLD AUTO: 10.9 10*3/MM3 (ref 3.4–10.8)

## 2021-01-25 PROCEDURE — 84100 ASSAY OF PHOSPHORUS: CPT | Performed by: NURSE PRACTITIONER

## 2021-01-25 PROCEDURE — 84134 ASSAY OF PREALBUMIN: CPT | Performed by: NURSE PRACTITIONER

## 2021-01-25 PROCEDURE — 25010000002 ALBUMIN HUMAN 25% PER 50 ML: Performed by: INTERNAL MEDICINE

## 2021-01-25 PROCEDURE — 99291 CRITICAL CARE FIRST HOUR: CPT | Performed by: INTERNAL MEDICINE

## 2021-01-25 PROCEDURE — 82962 GLUCOSE BLOOD TEST: CPT

## 2021-01-25 PROCEDURE — 5A1D70Z PERFORMANCE OF URINARY FILTRATION, INTERMITTENT, LESS THAN 6 HOURS PER DAY: ICD-10-PCS | Performed by: INTERNAL MEDICINE

## 2021-01-25 PROCEDURE — 82805 BLOOD GASES W/O2 SATURATION: CPT

## 2021-01-25 PROCEDURE — 82465 ASSAY BLD/SERUM CHOLESTEROL: CPT | Performed by: NURSE PRACTITIONER

## 2021-01-25 PROCEDURE — 25010000002 FENTANYL CITRATE (PF) 2500 MCG/50ML SOLUTION: Performed by: NURSE PRACTITIONER

## 2021-01-25 PROCEDURE — 94799 UNLISTED PULMONARY SVC/PX: CPT

## 2021-01-25 PROCEDURE — 25010000002 VANCOMYCIN 10 G RECONSTITUTED SOLUTION

## 2021-01-25 PROCEDURE — 85025 COMPLETE CBC W/AUTO DIFF WBC: CPT | Performed by: INTERNAL MEDICINE

## 2021-01-25 PROCEDURE — 25010000002 EPOETIN ALFA-EPBX 10000 UNIT/ML SOLUTION: Performed by: INTERNAL MEDICINE

## 2021-01-25 PROCEDURE — 25010000002 HEPARIN (PORCINE) PER 1000 UNITS

## 2021-01-25 PROCEDURE — 86140 C-REACTIVE PROTEIN: CPT | Performed by: NURSE PRACTITIONER

## 2021-01-25 PROCEDURE — 84478 ASSAY OF TRIGLYCERIDES: CPT | Performed by: NURSE PRACTITIONER

## 2021-01-25 PROCEDURE — 94003 VENT MGMT INPAT SUBQ DAY: CPT

## 2021-01-25 PROCEDURE — 85520 HEPARIN ASSAY: CPT

## 2021-01-25 PROCEDURE — 36600 WITHDRAWAL OF ARTERIAL BLOOD: CPT

## 2021-01-25 PROCEDURE — 71045 X-RAY EXAM CHEST 1 VIEW: CPT

## 2021-01-25 PROCEDURE — 25010000002 HEPARIN (PORCINE) 25000-0.45 UT/250ML-% SOLUTION

## 2021-01-25 PROCEDURE — 83735 ASSAY OF MAGNESIUM: CPT | Performed by: NURSE PRACTITIONER

## 2021-01-25 PROCEDURE — 25010000002 HEPARIN (PORCINE) PER 1000 UNITS: Performed by: NURSE PRACTITIONER

## 2021-01-25 PROCEDURE — 63710000001 INSULIN REGULAR HUMAN PER 5 UNITS: Performed by: NURSE PRACTITIONER

## 2021-01-25 PROCEDURE — 63710000001 INSULIN DETEMIR PER 5 UNITS: Performed by: INTERNAL MEDICINE

## 2021-01-25 PROCEDURE — 82375 ASSAY CARBOXYHB QUANT: CPT

## 2021-01-25 PROCEDURE — P9047 ALBUMIN (HUMAN), 25%, 50ML: HCPCS | Performed by: INTERNAL MEDICINE

## 2021-01-25 PROCEDURE — 80053 COMPREHEN METABOLIC PANEL: CPT | Performed by: NURSE PRACTITIONER

## 2021-01-25 PROCEDURE — 83050 HGB METHEMOGLOBIN QUAN: CPT

## 2021-01-25 RX ORDER — AMINO ACIDS/PROTEIN HYDROLYS 15G-100/30
30 LIQUID (ML) ORAL 2 TIMES DAILY
Status: DISCONTINUED | OUTPATIENT
Start: 2021-01-25 | End: 2021-02-17 | Stop reason: HOSPADM

## 2021-01-25 RX ORDER — MIDAZOLAM IN NACL,ISO-OSMOT/PF 50 MG/50ML
1-10 INFUSION BOTTLE (ML) INTRAVENOUS
Status: DISCONTINUED | OUTPATIENT
Start: 2021-01-25 | End: 2021-02-03

## 2021-01-25 RX ORDER — ALBUMIN (HUMAN) 12.5 G/50ML
12.5 SOLUTION INTRAVENOUS AS NEEDED
Status: COMPLETED | OUTPATIENT
Start: 2021-01-25 | End: 2021-01-25

## 2021-01-25 RX ORDER — HEPARIN SODIUM 1000 [USP'U]/ML
3000 INJECTION, SOLUTION INTRAVENOUS; SUBCUTANEOUS ONCE
Status: COMPLETED | OUTPATIENT
Start: 2021-01-25 | End: 2021-01-25

## 2021-01-25 RX ADMIN — EPOETIN ALFA-EPBX 10000 UNITS: 10000 INJECTION, SOLUTION INTRAVENOUS; SUBCUTANEOUS at 18:17

## 2021-01-25 RX ADMIN — POTASSIUM CHLORIDE 40 MEQ: 1.5 POWDER, FOR SOLUTION ORAL at 20:25

## 2021-01-25 RX ADMIN — CHLORHEXIDINE GLUCONATE 0.12% ORAL RINSE 15 ML: 1.2 LIQUID ORAL at 20:25

## 2021-01-25 RX ADMIN — IPRATROPIUM BROMIDE AND ALBUTEROL SULFATE 3 ML: 2.5; .5 SOLUTION RESPIRATORY (INHALATION) at 00:54

## 2021-01-25 RX ADMIN — SENNOSIDES 10 ML: 8.8 LIQUID ORAL at 20:24

## 2021-01-25 RX ADMIN — MINERAL OIL: 1000 LIQUID ORAL at 22:40

## 2021-01-25 RX ADMIN — VANCOMYCIN HYDROCHLORIDE 1250 MG: 100 INJECTION, POWDER, LYOPHILIZED, FOR SOLUTION INTRAVENOUS at 15:16

## 2021-01-25 RX ADMIN — HEPARIN SODIUM 3000 UNITS: 1000 INJECTION INTRAVENOUS; SUBCUTANEOUS at 02:03

## 2021-01-25 RX ADMIN — POTASSIUM CHLORIDE 40 MEQ: 1.5 POWDER, FOR SOLUTION ORAL at 12:21

## 2021-01-25 RX ADMIN — DOCUSATE SODIUM 100 MG: 50 LIQUID ORAL at 12:21

## 2021-01-25 RX ADMIN — Medication 1 MG/HR: at 15:15

## 2021-01-25 RX ADMIN — TERAZOSIN HYDROCHLORIDE 1 MG: 1 CAPSULE ORAL at 22:39

## 2021-01-25 RX ADMIN — INSULIN DETEMIR 15 UNITS: 100 INJECTION, SOLUTION SUBCUTANEOUS at 09:07

## 2021-01-25 RX ADMIN — INSULIN HUMAN 3 UNITS: 100 INJECTION, SOLUTION PARENTERAL at 00:38

## 2021-01-25 RX ADMIN — ALBUMIN HUMAN 12.5 G: 0.25 SOLUTION INTRAVENOUS at 08:47

## 2021-01-25 RX ADMIN — CHLORHEXIDINE GLUCONATE 0.12% ORAL RINSE 15 ML: 1.2 LIQUID ORAL at 12:21

## 2021-01-25 RX ADMIN — ALBUMIN HUMAN 12.5 G: 0.25 SOLUTION INTRAVENOUS at 08:23

## 2021-01-25 RX ADMIN — SERTRALINE HYDROCHLORIDE 25 MG: 25 TABLET ORAL at 12:21

## 2021-01-25 RX ADMIN — CARVEDILOL 25 MG: 12.5 TABLET, FILM COATED ORAL at 12:21

## 2021-01-25 RX ADMIN — CARVEDILOL 25 MG: 12.5 TABLET, FILM COATED ORAL at 18:17

## 2021-01-25 RX ADMIN — FENTANYL CITRATE 300 MCG/HR: 0.05 INJECTION, SOLUTION INTRAMUSCULAR; INTRAVENOUS at 15:25

## 2021-01-25 RX ADMIN — IPRATROPIUM BROMIDE AND ALBUTEROL SULFATE 3 ML: 2.5; .5 SOLUTION RESPIRATORY (INHALATION) at 19:29

## 2021-01-25 RX ADMIN — MINERAL OIL: 1000 LIQUID ORAL at 09:15

## 2021-01-25 RX ADMIN — ALBUMIN HUMAN 12.5 G: 0.25 SOLUTION INTRAVENOUS at 09:00

## 2021-01-25 RX ADMIN — SENNOSIDES 10 ML: 8.8 LIQUID ORAL at 12:20

## 2021-01-25 RX ADMIN — ALBUMIN HUMAN 12.5 G: 0.25 SOLUTION INTRAVENOUS at 08:22

## 2021-01-25 RX ADMIN — HEPARIN SODIUM 1800 UNITS: 1000 INJECTION INTRAVENOUS; SUBCUTANEOUS at 11:32

## 2021-01-25 RX ADMIN — FENTANYL CITRATE 250 MCG/HR: 0.05 INJECTION, SOLUTION INTRAMUSCULAR; INTRAVENOUS at 06:13

## 2021-01-25 RX ADMIN — ATORVASTATIN CALCIUM 80 MG: 40 TABLET, FILM COATED ORAL at 20:24

## 2021-01-25 RX ADMIN — ASPIRIN 81 MG CHEWABLE TABLET 81 MG: 81 TABLET CHEWABLE at 12:21

## 2021-01-25 RX ADMIN — IPRATROPIUM BROMIDE AND ALBUTEROL SULFATE 3 ML: 2.5; .5 SOLUTION RESPIRATORY (INHALATION) at 12:27

## 2021-01-25 RX ADMIN — IPRATROPIUM BROMIDE AND ALBUTEROL SULFATE 3 ML: 2.5; .5 SOLUTION RESPIRATORY (INHALATION) at 07:18

## 2021-01-25 RX ADMIN — HEPARIN SODIUM 10 UNITS/KG/HR: 10000 INJECTION, SOLUTION INTRAVENOUS at 09:07

## 2021-01-25 RX ADMIN — BISACODYL 10 MG: 5 TABLET, COATED ORAL at 12:21

## 2021-01-25 RX ADMIN — MINERAL OIL: 1000 LIQUID ORAL at 15:16

## 2021-01-25 RX ADMIN — LANSOPRAZOLE 30 MG: KIT at 06:05

## 2021-01-25 RX ADMIN — Medication 30 ML: at 20:25

## 2021-01-25 RX ADMIN — DOCUSATE SODIUM 100 MG: 50 LIQUID ORAL at 20:25

## 2021-01-26 ENCOUNTER — APPOINTMENT (OUTPATIENT)
Dept: GENERAL RADIOLOGY | Facility: HOSPITAL | Age: 71
End: 2021-01-26

## 2021-01-26 LAB
ANION GAP SERPL CALCULATED.3IONS-SCNC: 10 MMOL/L (ref 5–15)
ARTERIAL PATENCY WRIST A: ABNORMAL
ATMOSPHERIC PRESS: ABNORMAL MM[HG]
BASE EXCESS BLDA CALC-SCNC: 2.1 MMOL/L (ref 0–2)
BASOPHILS # BLD AUTO: 0.04 10*3/MM3 (ref 0–0.2)
BASOPHILS NFR BLD AUTO: 0.5 % (ref 0–1.5)
BDY SITE: ABNORMAL
BODY TEMPERATURE: 37 C
BUN SERPL-MCNC: 42 MG/DL (ref 8–23)
BUN/CREAT SERPL: 14.9 (ref 7–25)
CALCIUM SPEC-SCNC: 9.2 MG/DL (ref 8.6–10.5)
CHLORIDE SERPL-SCNC: 101 MMOL/L (ref 98–107)
CO2 BLDA-SCNC: 28.2 MMOL/L (ref 22–33)
CO2 SERPL-SCNC: 25 MMOL/L (ref 22–29)
COHGB MFR BLD: 1 % (ref 0–2)
CREAT SERPL-MCNC: 2.82 MG/DL (ref 0.57–1)
DEPRECATED RDW RBC AUTO: 71.7 FL (ref 37–54)
EOSINOPHIL # BLD AUTO: 0.58 10*3/MM3 (ref 0–0.4)
EOSINOPHIL NFR BLD AUTO: 6.6 % (ref 0.3–6.2)
EPAP: 0
ERYTHROCYTE [DISTWIDTH] IN BLOOD BY AUTOMATED COUNT: 18.7 % (ref 12.3–15.4)
GFR SERPL CREATININE-BSD FRML MDRD: 20 ML/MIN/1.73
GLUCOSE BLDC GLUCOMTR-MCNC: 109 MG/DL (ref 70–130)
GLUCOSE BLDC GLUCOMTR-MCNC: 114 MG/DL (ref 70–130)
GLUCOSE BLDC GLUCOMTR-MCNC: 121 MG/DL (ref 70–130)
GLUCOSE BLDC GLUCOMTR-MCNC: 142 MG/DL (ref 70–130)
GLUCOSE BLDC GLUCOMTR-MCNC: 168 MG/DL (ref 70–130)
GLUCOSE SERPL-MCNC: 119 MG/DL (ref 65–99)
HCO3 BLDA-SCNC: 26.9 MMOL/L (ref 20–26)
HCT VFR BLD AUTO: 26.3 % (ref 34–46.6)
HCT VFR BLD CALC: 23.5 %
HGB BLD-MCNC: 7.7 G/DL (ref 12–15.9)
HGB BLDA-MCNC: 7.7 G/DL (ref 14–18)
IMM GRANULOCYTES # BLD AUTO: 0.06 10*3/MM3 (ref 0–0.05)
IMM GRANULOCYTES NFR BLD AUTO: 0.7 % (ref 0–0.5)
INHALED O2 CONCENTRATION: 30 %
IPAP: 0
LYMPHOCYTES # BLD AUTO: 1.57 10*3/MM3 (ref 0.7–3.1)
LYMPHOCYTES NFR BLD AUTO: 17.9 % (ref 19.6–45.3)
MAGNESIUM SERPL-MCNC: 2.2 MG/DL (ref 1.6–2.4)
MCH RBC QN AUTO: 30.6 PG (ref 26.6–33)
MCHC RBC AUTO-ENTMCNC: 29.3 G/DL (ref 31.5–35.7)
MCV RBC AUTO: 104.4 FL (ref 79–97)
METHGB BLD QL: -0.7 % (ref 0–1.5)
MODALITY: ABNORMAL
MONOCYTES # BLD AUTO: 0.74 10*3/MM3 (ref 0.1–0.9)
MONOCYTES NFR BLD AUTO: 8.4 % (ref 5–12)
NEUTROPHILS NFR BLD AUTO: 5.8 10*3/MM3 (ref 1.7–7)
NEUTROPHILS NFR BLD AUTO: 65.9 % (ref 42.7–76)
NOTE: ABNORMAL
NRBC BLD AUTO-RTO: 0 /100 WBC (ref 0–0.2)
OXYHGB MFR BLDV: 97 % (ref 94–99)
PAW @ PEAK INSP FLOW SETTING VENT: 0 CMH2O
PCO2 BLDA: 42.5 MM HG (ref 35–45)
PCO2 TEMP ADJ BLD: 42.5 MM HG (ref 35–45)
PH BLDA: 7.41 PH UNITS (ref 7.35–7.45)
PH, TEMP CORRECTED: 7.41 PH UNITS
PHOSPHATE SERPL-MCNC: 1.6 MG/DL (ref 2.5–4.5)
PLATELET # BLD AUTO: 218 10*3/MM3 (ref 140–450)
PMV BLD AUTO: 10.3 FL (ref 6–12)
PO2 BLDA: 83.9 MM HG (ref 83–108)
PO2 TEMP ADJ BLD: 83.9 MM HG (ref 83–108)
POTASSIUM SERPL-SCNC: 4.8 MMOL/L (ref 3.5–5.2)
RBC # BLD AUTO: 2.52 10*6/MM3 (ref 3.77–5.28)
SODIUM SERPL-SCNC: 136 MMOL/L (ref 136–145)
TOTAL RATE: 0 BREATHS/MINUTE
UFH PPP CHRO-ACNC: 0.37 IU/ML (ref 0.3–0.7)
UFH PPP CHRO-ACNC: 0.41 IU/ML (ref 0.3–0.7)
WBC # BLD AUTO: 8.79 10*3/MM3 (ref 3.4–10.8)

## 2021-01-26 PROCEDURE — 71045 X-RAY EXAM CHEST 1 VIEW: CPT

## 2021-01-26 PROCEDURE — 94799 UNLISTED PULMONARY SVC/PX: CPT

## 2021-01-26 PROCEDURE — 82805 BLOOD GASES W/O2 SATURATION: CPT

## 2021-01-26 PROCEDURE — 25010000002 FENTANYL CITRATE (PF) 2500 MCG/50ML SOLUTION: Performed by: NURSE PRACTITIONER

## 2021-01-26 PROCEDURE — 63710000001 INSULIN REGULAR HUMAN PER 5 UNITS: Performed by: NURSE PRACTITIONER

## 2021-01-26 PROCEDURE — 36600 WITHDRAWAL OF ARTERIAL BLOOD: CPT

## 2021-01-26 PROCEDURE — 84100 ASSAY OF PHOSPHORUS: CPT | Performed by: INTERNAL MEDICINE

## 2021-01-26 PROCEDURE — 63710000001 INSULIN DETEMIR PER 5 UNITS: Performed by: INTERNAL MEDICINE

## 2021-01-26 PROCEDURE — 83735 ASSAY OF MAGNESIUM: CPT | Performed by: INTERNAL MEDICINE

## 2021-01-26 PROCEDURE — 85025 COMPLETE CBC W/AUTO DIFF WBC: CPT | Performed by: INTERNAL MEDICINE

## 2021-01-26 PROCEDURE — 99291 CRITICAL CARE FIRST HOUR: CPT | Performed by: INTERNAL MEDICINE

## 2021-01-26 PROCEDURE — 82375 ASSAY CARBOXYHB QUANT: CPT

## 2021-01-26 PROCEDURE — 94003 VENT MGMT INPAT SUBQ DAY: CPT

## 2021-01-26 PROCEDURE — 83050 HGB METHEMOGLOBIN QUAN: CPT

## 2021-01-26 PROCEDURE — 25010000002 HEPARIN (PORCINE) 25000-0.45 UT/250ML-% SOLUTION

## 2021-01-26 PROCEDURE — 82962 GLUCOSE BLOOD TEST: CPT

## 2021-01-26 PROCEDURE — 80048 BASIC METABOLIC PNL TOTAL CA: CPT | Performed by: INTERNAL MEDICINE

## 2021-01-26 PROCEDURE — 85520 HEPARIN ASSAY: CPT

## 2021-01-26 RX ORDER — ENALAPRILAT 2.5 MG/2ML
2.5 INJECTION INTRAVENOUS ONCE
Status: DISCONTINUED | OUTPATIENT
Start: 2021-01-26 | End: 2021-01-26

## 2021-01-26 RX ORDER — DEXTROSE MONOHYDRATE 50 MG/ML
50 INJECTION, SOLUTION INTRAVENOUS CONTINUOUS
Status: DISCONTINUED | OUTPATIENT
Start: 2021-01-26 | End: 2021-01-26

## 2021-01-26 RX ORDER — FOLIC ACID/VIT B COMPLEX AND C 0.8 MG
1 TABLET ORAL DAILY
Status: DISCONTINUED | OUTPATIENT
Start: 2021-01-27 | End: 2021-01-28

## 2021-01-26 RX ADMIN — HEPARIN SODIUM 10 UNITS/KG/HR: 10000 INJECTION, SOLUTION INTRAVENOUS at 22:04

## 2021-01-26 RX ADMIN — FENTANYL CITRATE 200 MCG/HR: 0.05 INJECTION, SOLUTION INTRAMUSCULAR; INTRAVENOUS at 04:27

## 2021-01-26 RX ADMIN — SERTRALINE HYDROCHLORIDE 25 MG: 25 TABLET ORAL at 08:18

## 2021-01-26 RX ADMIN — POTASSIUM CHLORIDE 40 MEQ: 1.5 POWDER, FOR SOLUTION ORAL at 20:55

## 2021-01-26 RX ADMIN — MINERAL OIL: 1000 LIQUID ORAL at 20:55

## 2021-01-26 RX ADMIN — INSULIN DETEMIR 15 UNITS: 100 INJECTION, SOLUTION SUBCUTANEOUS at 08:20

## 2021-01-26 RX ADMIN — IPRATROPIUM BROMIDE AND ALBUTEROL SULFATE 3 ML: 2.5; .5 SOLUTION RESPIRATORY (INHALATION) at 12:16

## 2021-01-26 RX ADMIN — IPRATROPIUM BROMIDE AND ALBUTEROL SULFATE 3 ML: 2.5; .5 SOLUTION RESPIRATORY (INHALATION) at 00:36

## 2021-01-26 RX ADMIN — SENNOSIDES 10 ML: 8.8 LIQUID ORAL at 08:18

## 2021-01-26 RX ADMIN — ASPIRIN 81 MG CHEWABLE TABLET 81 MG: 81 TABLET CHEWABLE at 08:18

## 2021-01-26 RX ADMIN — CHLORHEXIDINE GLUCONATE 0.12% ORAL RINSE 15 ML: 1.2 LIQUID ORAL at 20:55

## 2021-01-26 RX ADMIN — TERAZOSIN HYDROCHLORIDE 1 MG: 1 CAPSULE ORAL at 20:55

## 2021-01-26 RX ADMIN — DOCUSATE SODIUM 100 MG: 50 LIQUID ORAL at 08:18

## 2021-01-26 RX ADMIN — POTASSIUM CHLORIDE 40 MEQ: 1.5 POWDER, FOR SOLUTION ORAL at 08:18

## 2021-01-26 RX ADMIN — LANSOPRAZOLE 30 MG: KIT at 06:09

## 2021-01-26 RX ADMIN — CHLORHEXIDINE GLUCONATE 0.12% ORAL RINSE 15 ML: 1.2 LIQUID ORAL at 08:18

## 2021-01-26 RX ADMIN — IPRATROPIUM BROMIDE AND ALBUTEROL SULFATE 3 ML: 2.5; .5 SOLUTION RESPIRATORY (INHALATION) at 07:45

## 2021-01-26 RX ADMIN — INSULIN HUMAN 3 UNITS: 100 INJECTION, SOLUTION PARENTERAL at 00:42

## 2021-01-26 RX ADMIN — SODIUM PHOSPHATE, MONOBASIC, MONOHYDRATE AND SODIUM PHOSPHATE, DIBASIC, ANHYDROUS 20 MMOL: 276; 142 INJECTION, SOLUTION INTRAVENOUS at 10:36

## 2021-01-26 RX ADMIN — Medication 30 ML: at 08:21

## 2021-01-26 RX ADMIN — CARVEDILOL 25 MG: 12.5 TABLET, FILM COATED ORAL at 08:18

## 2021-01-26 RX ADMIN — MINERAL OIL: 1000 LIQUID ORAL at 08:20

## 2021-01-26 RX ADMIN — HEPARIN SODIUM 10 UNITS/KG/HR: 10000 INJECTION, SOLUTION INTRAVENOUS at 02:32

## 2021-01-26 RX ADMIN — CARVEDILOL 25 MG: 12.5 TABLET, FILM COATED ORAL at 17:59

## 2021-01-26 RX ADMIN — ATORVASTATIN CALCIUM 80 MG: 40 TABLET, FILM COATED ORAL at 20:55

## 2021-01-26 RX ADMIN — IPRATROPIUM BROMIDE AND ALBUTEROL SULFATE 3 ML: 2.5; .5 SOLUTION RESPIRATORY (INHALATION) at 19:08

## 2021-01-26 RX ADMIN — FENTANYL CITRATE 200 MCG/HR: 0.05 INJECTION, SOLUTION INTRAMUSCULAR; INTRAVENOUS at 16:34

## 2021-01-26 RX ADMIN — SENNOSIDES 10 ML: 8.8 LIQUID ORAL at 20:55

## 2021-01-26 RX ADMIN — Medication 30 ML: at 20:56

## 2021-01-26 RX ADMIN — DOCUSATE SODIUM 100 MG: 50 LIQUID ORAL at 20:55

## 2021-01-27 ENCOUNTER — APPOINTMENT (OUTPATIENT)
Dept: NEPHROLOGY | Facility: HOSPITAL | Age: 71
End: 2021-01-27

## 2021-01-27 LAB
ANION GAP SERPL CALCULATED.3IONS-SCNC: 12 MMOL/L (ref 5–15)
BACTERIA SPEC AEROBE CULT: NORMAL
BACTERIA SPEC AEROBE CULT: NORMAL
BUN SERPL-MCNC: 61 MG/DL (ref 8–23)
BUN/CREAT SERPL: 16.9 (ref 7–25)
CALCIUM SPEC-SCNC: 9.4 MG/DL (ref 8.6–10.5)
CHLORIDE SERPL-SCNC: 100 MMOL/L (ref 98–107)
CO2 SERPL-SCNC: 22 MMOL/L (ref 22–29)
CREAT SERPL-MCNC: 3.6 MG/DL (ref 0.57–1)
GFR SERPL CREATININE-BSD FRML MDRD: 15 ML/MIN/1.73
GLUCOSE BLDC GLUCOMTR-MCNC: 119 MG/DL (ref 70–130)
GLUCOSE BLDC GLUCOMTR-MCNC: 133 MG/DL (ref 70–130)
GLUCOSE BLDC GLUCOMTR-MCNC: 142 MG/DL (ref 70–130)
GLUCOSE BLDC GLUCOMTR-MCNC: 156 MG/DL (ref 70–130)
GLUCOSE SERPL-MCNC: 104 MG/DL (ref 65–99)
MAGNESIUM SERPL-MCNC: 1.9 MG/DL (ref 1.6–2.4)
PHOSPHATE SERPL-MCNC: 2.7 MG/DL (ref 2.5–4.5)
POTASSIUM SERPL-SCNC: 5.8 MMOL/L (ref 3.5–5.2)
SODIUM SERPL-SCNC: 134 MMOL/L (ref 136–145)
UFH PPP CHRO-ACNC: 0.4 IU/ML (ref 0.3–0.7)
VANCOMYCIN SERPL-MCNC: 24.6 MCG/ML (ref 5–40)

## 2021-01-27 PROCEDURE — 94799 UNLISTED PULMONARY SVC/PX: CPT

## 2021-01-27 PROCEDURE — 63710000001 INSULIN DETEMIR PER 5 UNITS: Performed by: INTERNAL MEDICINE

## 2021-01-27 PROCEDURE — 80202 ASSAY OF VANCOMYCIN: CPT

## 2021-01-27 PROCEDURE — P9047 ALBUMIN (HUMAN), 25%, 50ML: HCPCS | Performed by: INTERNAL MEDICINE

## 2021-01-27 PROCEDURE — 25010000002 ALBUMIN HUMAN 25% PER 50 ML: Performed by: INTERNAL MEDICINE

## 2021-01-27 PROCEDURE — 82962 GLUCOSE BLOOD TEST: CPT

## 2021-01-27 PROCEDURE — 25010000002 HEPARIN (PORCINE) 25000-0.45 UT/250ML-% SOLUTION

## 2021-01-27 PROCEDURE — 83735 ASSAY OF MAGNESIUM: CPT | Performed by: INTERNAL MEDICINE

## 2021-01-27 PROCEDURE — 25010000002 FENTANYL CITRATE (PF) 2500 MCG/50ML SOLUTION: Performed by: NURSE PRACTITIONER

## 2021-01-27 PROCEDURE — 80048 BASIC METABOLIC PNL TOTAL CA: CPT | Performed by: INTERNAL MEDICINE

## 2021-01-27 PROCEDURE — 85520 HEPARIN ASSAY: CPT

## 2021-01-27 PROCEDURE — 84100 ASSAY OF PHOSPHORUS: CPT | Performed by: INTERNAL MEDICINE

## 2021-01-27 PROCEDURE — 94003 VENT MGMT INPAT SUBQ DAY: CPT

## 2021-01-27 PROCEDURE — 99233 SBSQ HOSP IP/OBS HIGH 50: CPT | Performed by: INTERNAL MEDICINE

## 2021-01-27 PROCEDURE — 25010000002 EPOETIN ALFA-EPBX 10000 UNIT/ML SOLUTION: Performed by: INTERNAL MEDICINE

## 2021-01-27 RX ORDER — VANCOMYCIN HYDROCHLORIDE 1 G/200ML
1000 INJECTION, SOLUTION INTRAVENOUS
Status: COMPLETED | OUTPATIENT
Start: 2021-01-29 | End: 2021-01-29

## 2021-01-27 RX ORDER — ALBUMIN (HUMAN) 12.5 G/50ML
12.5 SOLUTION INTRAVENOUS AS NEEDED
Status: DISPENSED | OUTPATIENT
Start: 2021-01-27 | End: 2021-01-27

## 2021-01-27 RX ADMIN — IPRATROPIUM BROMIDE AND ALBUTEROL SULFATE 3 ML: 2.5; .5 SOLUTION RESPIRATORY (INHALATION) at 00:46

## 2021-01-27 RX ADMIN — CHLORHEXIDINE GLUCONATE 0.12% ORAL RINSE 15 ML: 1.2 LIQUID ORAL at 08:07

## 2021-01-27 RX ADMIN — CHLORHEXIDINE GLUCONATE 0.12% ORAL RINSE 15 ML: 1.2 LIQUID ORAL at 20:26

## 2021-01-27 RX ADMIN — EPOETIN ALFA-EPBX 10000 UNITS: 10000 INJECTION, SOLUTION INTRAVENOUS; SUBCUTANEOUS at 11:13

## 2021-01-27 RX ADMIN — SODIUM CHLORIDE, PRESERVATIVE FREE 10 ML: 5 INJECTION INTRAVENOUS at 20:27

## 2021-01-27 RX ADMIN — IPRATROPIUM BROMIDE AND ALBUTEROL SULFATE 3 ML: 2.5; .5 SOLUTION RESPIRATORY (INHALATION) at 20:23

## 2021-01-27 RX ADMIN — INSULIN DETEMIR 15 UNITS: 100 INJECTION, SOLUTION SUBCUTANEOUS at 08:06

## 2021-01-27 RX ADMIN — ASPIRIN 81 MG CHEWABLE TABLET 81 MG: 81 TABLET CHEWABLE at 13:23

## 2021-01-27 RX ADMIN — SERTRALINE HYDROCHLORIDE 25 MG: 25 TABLET ORAL at 13:23

## 2021-01-27 RX ADMIN — ALBUMIN HUMAN 12.5 G: 0.25 SOLUTION INTRAVENOUS at 10:20

## 2021-01-27 RX ADMIN — DOCUSATE SODIUM 100 MG: 50 LIQUID ORAL at 13:25

## 2021-01-27 RX ADMIN — LANSOPRAZOLE 30 MG: KIT at 05:43

## 2021-01-27 RX ADMIN — IPRATROPIUM BROMIDE AND ALBUTEROL SULFATE 3 ML: 2.5; .5 SOLUTION RESPIRATORY (INHALATION) at 13:01

## 2021-01-27 RX ADMIN — DOCUSATE SODIUM 100 MG: 50 LIQUID ORAL at 20:27

## 2021-01-27 RX ADMIN — CARVEDILOL 25 MG: 12.5 TABLET, FILM COATED ORAL at 18:07

## 2021-01-27 RX ADMIN — ALBUMIN HUMAN 12.5 G: 0.25 SOLUTION INTRAVENOUS at 09:54

## 2021-01-27 RX ADMIN — SENNOSIDES 10 ML: 8.8 LIQUID ORAL at 13:24

## 2021-01-27 RX ADMIN — ATORVASTATIN CALCIUM 80 MG: 40 TABLET, FILM COATED ORAL at 20:26

## 2021-01-27 RX ADMIN — MINERAL OIL: 1000 LIQUID ORAL at 20:27

## 2021-01-27 RX ADMIN — HEPARIN SODIUM 10 UNITS/KG/HR: 10000 INJECTION, SOLUTION INTRAVENOUS at 13:32

## 2021-01-27 RX ADMIN — Medication 30 ML: at 13:24

## 2021-01-27 RX ADMIN — SENNOSIDES 10 ML: 8.8 LIQUID ORAL at 20:27

## 2021-01-27 RX ADMIN — Medication 1 MG/HR: at 13:28

## 2021-01-27 RX ADMIN — FENTANYL CITRATE 200 MCG/HR: 0.05 INJECTION, SOLUTION INTRAMUSCULAR; INTRAVENOUS at 08:06

## 2021-01-27 RX ADMIN — Medication 1 TABLET: at 13:22

## 2021-01-27 RX ADMIN — Medication 30 ML: at 20:27

## 2021-01-27 RX ADMIN — TERAZOSIN HYDROCHLORIDE 1 MG: 1 CAPSULE ORAL at 20:27

## 2021-01-28 LAB
ANION GAP SERPL CALCULATED.3IONS-SCNC: 13 MMOL/L (ref 5–15)
BUN SERPL-MCNC: 42 MG/DL (ref 8–23)
BUN/CREAT SERPL: 15.6 (ref 7–25)
CALCIUM SPEC-SCNC: 9.5 MG/DL (ref 8.6–10.5)
CHLORIDE SERPL-SCNC: 97 MMOL/L (ref 98–107)
CO2 SERPL-SCNC: 27 MMOL/L (ref 22–29)
CREAT SERPL-MCNC: 2.69 MG/DL (ref 0.57–1)
GFR SERPL CREATININE-BSD FRML MDRD: 21 ML/MIN/1.73
GLUCOSE BLDC GLUCOMTR-MCNC: 126 MG/DL (ref 70–130)
GLUCOSE BLDC GLUCOMTR-MCNC: 127 MG/DL (ref 70–130)
GLUCOSE BLDC GLUCOMTR-MCNC: 146 MG/DL (ref 70–130)
GLUCOSE BLDC GLUCOMTR-MCNC: 150 MG/DL (ref 70–130)
GLUCOSE SERPL-MCNC: 150 MG/DL (ref 65–99)
MAGNESIUM SERPL-MCNC: 1.9 MG/DL (ref 1.6–2.4)
PHOSPHATE SERPL-MCNC: 2 MG/DL (ref 2.5–4.5)
POTASSIUM SERPL-SCNC: 4.1 MMOL/L (ref 3.5–5.2)
SODIUM SERPL-SCNC: 137 MMOL/L (ref 136–145)
UFH PPP CHRO-ACNC: 0.3 IU/ML (ref 0.3–0.7)

## 2021-01-28 PROCEDURE — 94799 UNLISTED PULMONARY SVC/PX: CPT

## 2021-01-28 PROCEDURE — 25010000002 FENTANYL CITRATE (PF) 2500 MCG/50ML SOLUTION: Performed by: NURSE PRACTITIONER

## 2021-01-28 PROCEDURE — 82962 GLUCOSE BLOOD TEST: CPT

## 2021-01-28 PROCEDURE — 84100 ASSAY OF PHOSPHORUS: CPT | Performed by: INTERNAL MEDICINE

## 2021-01-28 PROCEDURE — 25010000002 HEPARIN (PORCINE) 25000-0.45 UT/250ML-% SOLUTION

## 2021-01-28 PROCEDURE — 63710000001 INSULIN REGULAR HUMAN PER 5 UNITS: Performed by: NURSE PRACTITIONER

## 2021-01-28 PROCEDURE — 80048 BASIC METABOLIC PNL TOTAL CA: CPT | Performed by: INTERNAL MEDICINE

## 2021-01-28 PROCEDURE — 94003 VENT MGMT INPAT SUBQ DAY: CPT

## 2021-01-28 PROCEDURE — 85520 HEPARIN ASSAY: CPT

## 2021-01-28 PROCEDURE — 99233 SBSQ HOSP IP/OBS HIGH 50: CPT | Performed by: INTERNAL MEDICINE

## 2021-01-28 PROCEDURE — 63710000001 INSULIN DETEMIR PER 5 UNITS: Performed by: INTERNAL MEDICINE

## 2021-01-28 PROCEDURE — 83735 ASSAY OF MAGNESIUM: CPT | Performed by: INTERNAL MEDICINE

## 2021-01-28 RX ORDER — ACETAMINOPHEN 160 MG/5ML
500 SOLUTION ORAL EVERY 6 HOURS PRN
Status: DISCONTINUED | OUTPATIENT
Start: 2021-01-28 | End: 2021-02-17 | Stop reason: HOSPADM

## 2021-01-28 RX ORDER — FOLIC ACID/VIT B COMPLEX AND C 0.8 MG
1 TABLET ORAL DAILY
Status: DISCONTINUED | OUTPATIENT
Start: 2021-01-29 | End: 2021-02-17 | Stop reason: HOSPADM

## 2021-01-28 RX ORDER — LANSOPRAZOLE
30 KIT
Status: DISCONTINUED | OUTPATIENT
Start: 2021-01-29 | End: 2021-02-17 | Stop reason: HOSPADM

## 2021-01-28 RX ORDER — DOCUSATE SODIUM 50 MG/5 ML
100 LIQUID (ML) ORAL 2 TIMES DAILY
Status: DISCONTINUED | OUTPATIENT
Start: 2021-01-28 | End: 2021-02-11

## 2021-01-28 RX ORDER — TERAZOSIN 1 MG/1
1 CAPSULE ORAL NIGHTLY
Status: DISCONTINUED | OUTPATIENT
Start: 2021-01-28 | End: 2021-02-17 | Stop reason: HOSPADM

## 2021-01-28 RX ORDER — ASPIRIN 81 MG/1
81 TABLET, CHEWABLE ORAL DAILY
Status: DISCONTINUED | OUTPATIENT
Start: 2021-01-29 | End: 2021-02-17 | Stop reason: HOSPADM

## 2021-01-28 RX ORDER — ATORVASTATIN CALCIUM 40 MG/1
80 TABLET, FILM COATED ORAL NIGHTLY
Status: DISCONTINUED | OUTPATIENT
Start: 2021-01-28 | End: 2021-02-17 | Stop reason: HOSPADM

## 2021-01-28 RX ORDER — CARVEDILOL 12.5 MG/1
25 TABLET ORAL 2 TIMES DAILY WITH MEALS
Status: DISCONTINUED | OUTPATIENT
Start: 2021-01-28 | End: 2021-02-15

## 2021-01-28 RX ORDER — SERTRALINE HYDROCHLORIDE 25 MG/1
25 TABLET, FILM COATED ORAL DAILY
Status: DISCONTINUED | OUTPATIENT
Start: 2021-01-29 | End: 2021-02-17 | Stop reason: HOSPADM

## 2021-01-28 RX ADMIN — TERAZOSIN HYDROCHLORIDE 1 MG: 1 CAPSULE ORAL at 20:26

## 2021-01-28 RX ADMIN — HEPARIN SODIUM 10 UNITS/KG/HR: 10000 INJECTION, SOLUTION INTRAVENOUS at 21:54

## 2021-01-28 RX ADMIN — CARVEDILOL 25 MG: 12.5 TABLET, FILM COATED ORAL at 08:01

## 2021-01-28 RX ADMIN — DOCUSATE SODIUM 100 MG: 50 LIQUID ORAL at 20:26

## 2021-01-28 RX ADMIN — IPRATROPIUM BROMIDE AND ALBUTEROL SULFATE 3 ML: 2.5; .5 SOLUTION RESPIRATORY (INHALATION) at 19:48

## 2021-01-28 RX ADMIN — CHLORHEXIDINE GLUCONATE 0.12% ORAL RINSE 15 ML: 1.2 LIQUID ORAL at 08:02

## 2021-01-28 RX ADMIN — LANSOPRAZOLE 30 MG: KIT at 05:42

## 2021-01-28 RX ADMIN — MINERAL OIL: 1000 LIQUID ORAL at 20:26

## 2021-01-28 RX ADMIN — SODIUM CHLORIDE, PRESERVATIVE FREE 10 ML: 5 INJECTION INTRAVENOUS at 20:26

## 2021-01-28 RX ADMIN — CARVEDILOL 25 MG: 12.5 TABLET, FILM COATED ORAL at 17:26

## 2021-01-28 RX ADMIN — IPRATROPIUM BROMIDE AND ALBUTEROL SULFATE 3 ML: 2.5; .5 SOLUTION RESPIRATORY (INHALATION) at 00:49

## 2021-01-28 RX ADMIN — SODIUM CHLORIDE, PRESERVATIVE FREE 10 ML: 5 INJECTION INTRAVENOUS at 08:02

## 2021-01-28 RX ADMIN — FENTANYL CITRATE 100 MCG/HR: 0.05 INJECTION, SOLUTION INTRAMUSCULAR; INTRAVENOUS at 17:33

## 2021-01-28 RX ADMIN — Medication 30 ML: at 08:10

## 2021-01-28 RX ADMIN — FENTANYL CITRATE 200 MCG/HR: 0.05 INJECTION, SOLUTION INTRAMUSCULAR; INTRAVENOUS at 01:57

## 2021-01-28 RX ADMIN — Medication 1 TABLET: at 08:02

## 2021-01-28 RX ADMIN — MINERAL OIL: 1000 LIQUID ORAL at 08:02

## 2021-01-28 RX ADMIN — INSULIN HUMAN 3 UNITS: 100 INJECTION, SOLUTION PARENTERAL at 00:54

## 2021-01-28 RX ADMIN — IPRATROPIUM BROMIDE AND ALBUTEROL SULFATE 3 ML: 2.5; .5 SOLUTION RESPIRATORY (INHALATION) at 13:05

## 2021-01-28 RX ADMIN — SENNOSIDES 10 ML: 8.8 LIQUID ORAL at 08:09

## 2021-01-28 RX ADMIN — CHLORHEXIDINE GLUCONATE 0.12% ORAL RINSE 15 ML: 1.2 LIQUID ORAL at 20:26

## 2021-01-28 RX ADMIN — INSULIN HUMAN 3 UNITS: 100 INJECTION, SOLUTION PARENTERAL at 05:42

## 2021-01-28 RX ADMIN — DOCUSATE SODIUM 100 MG: 50 LIQUID ORAL at 08:02

## 2021-01-28 RX ADMIN — IPRATROPIUM BROMIDE AND ALBUTEROL SULFATE 3 ML: 2.5; .5 SOLUTION RESPIRATORY (INHALATION) at 07:53

## 2021-01-28 RX ADMIN — INSULIN DETEMIR 15 UNITS: 100 INJECTION, SOLUTION SUBCUTANEOUS at 08:10

## 2021-01-28 RX ADMIN — SENNOSIDES 10 ML: 8.8 LIQUID ORAL at 20:26

## 2021-01-28 RX ADMIN — ATORVASTATIN CALCIUM 80 MG: 40 TABLET, FILM COATED ORAL at 20:26

## 2021-01-28 RX ADMIN — Medication 30 ML: at 20:27

## 2021-01-28 RX ADMIN — ASPIRIN 81 MG CHEWABLE TABLET 81 MG: 81 TABLET CHEWABLE at 08:01

## 2021-01-28 RX ADMIN — MINERAL OIL: 1000 LIQUID ORAL at 16:14

## 2021-01-28 RX ADMIN — HEPARIN SODIUM 10 UNITS/KG/HR: 10000 INJECTION, SOLUTION INTRAVENOUS at 05:42

## 2021-01-28 RX ADMIN — SERTRALINE HYDROCHLORIDE 25 MG: 25 TABLET ORAL at 08:02

## 2021-01-29 ENCOUNTER — APPOINTMENT (OUTPATIENT)
Dept: NEPHROLOGY | Facility: HOSPITAL | Age: 71
End: 2021-01-29

## 2021-01-29 LAB
ANION GAP SERPL CALCULATED.3IONS-SCNC: 15 MMOL/L (ref 5–15)
BASOPHILS # BLD AUTO: 0.04 10*3/MM3 (ref 0–0.2)
BASOPHILS NFR BLD AUTO: 0.4 % (ref 0–1.5)
BUN SERPL-MCNC: 61 MG/DL (ref 8–23)
BUN/CREAT SERPL: 18.2 (ref 7–25)
CALCIUM SPEC-SCNC: 9.5 MG/DL (ref 8.6–10.5)
CHLORIDE SERPL-SCNC: 95 MMOL/L (ref 98–107)
CO2 SERPL-SCNC: 24 MMOL/L (ref 22–29)
CREAT SERPL-MCNC: 3.35 MG/DL (ref 0.57–1)
DEPRECATED RDW RBC AUTO: 70 FL (ref 37–54)
EOSINOPHIL # BLD AUTO: 0.46 10*3/MM3 (ref 0–0.4)
EOSINOPHIL NFR BLD AUTO: 4.5 % (ref 0.3–6.2)
ERYTHROCYTE [DISTWIDTH] IN BLOOD BY AUTOMATED COUNT: 18 % (ref 12.3–15.4)
GFR SERPL CREATININE-BSD FRML MDRD: 17 ML/MIN/1.73
GLUCOSE BLDC GLUCOMTR-MCNC: 125 MG/DL (ref 70–130)
GLUCOSE BLDC GLUCOMTR-MCNC: 137 MG/DL (ref 70–130)
GLUCOSE BLDC GLUCOMTR-MCNC: 163 MG/DL (ref 70–130)
GLUCOSE SERPL-MCNC: 152 MG/DL (ref 65–99)
HCT VFR BLD AUTO: 27.6 % (ref 34–46.6)
HGB BLD-MCNC: 7.8 G/DL (ref 12–15.9)
IMM GRANULOCYTES # BLD AUTO: 0.14 10*3/MM3 (ref 0–0.05)
IMM GRANULOCYTES NFR BLD AUTO: 1.4 % (ref 0–0.5)
LYMPHOCYTES # BLD AUTO: 1.12 10*3/MM3 (ref 0.7–3.1)
LYMPHOCYTES NFR BLD AUTO: 11 % (ref 19.6–45.3)
MAGNESIUM SERPL-MCNC: 2 MG/DL (ref 1.6–2.4)
MCH RBC QN AUTO: 30.1 PG (ref 26.6–33)
MCHC RBC AUTO-ENTMCNC: 28.3 G/DL (ref 31.5–35.7)
MCV RBC AUTO: 106.6 FL (ref 79–97)
MONOCYTES # BLD AUTO: 0.66 10*3/MM3 (ref 0.1–0.9)
MONOCYTES NFR BLD AUTO: 6.5 % (ref 5–12)
NEUTROPHILS NFR BLD AUTO: 7.76 10*3/MM3 (ref 1.7–7)
NEUTROPHILS NFR BLD AUTO: 76.2 % (ref 42.7–76)
NRBC BLD AUTO-RTO: 0.2 /100 WBC (ref 0–0.2)
PHOSPHATE SERPL-MCNC: 2.3 MG/DL (ref 2.5–4.5)
PLATELET # BLD AUTO: 217 10*3/MM3 (ref 140–450)
PMV BLD AUTO: 10.7 FL (ref 6–12)
POTASSIUM SERPL-SCNC: 4.3 MMOL/L (ref 3.5–5.2)
RBC # BLD AUTO: 2.59 10*6/MM3 (ref 3.77–5.28)
SODIUM SERPL-SCNC: 134 MMOL/L (ref 136–145)
UFH PPP CHRO-ACNC: 0.27 IU/ML (ref 0.3–0.7)
UFH PPP CHRO-ACNC: 0.57 IU/ML (ref 0.3–0.7)
UFH PPP CHRO-ACNC: 0.58 IU/ML (ref 0.3–0.7)
WBC # BLD AUTO: 10.18 10*3/MM3 (ref 3.4–10.8)

## 2021-01-29 PROCEDURE — 80048 BASIC METABOLIC PNL TOTAL CA: CPT | Performed by: INTERNAL MEDICINE

## 2021-01-29 PROCEDURE — 99233 SBSQ HOSP IP/OBS HIGH 50: CPT | Performed by: INTERNAL MEDICINE

## 2021-01-29 PROCEDURE — 84100 ASSAY OF PHOSPHORUS: CPT | Performed by: INTERNAL MEDICINE

## 2021-01-29 PROCEDURE — 25010000002 VANCOMYCIN PER 500 MG

## 2021-01-29 PROCEDURE — 82962 GLUCOSE BLOOD TEST: CPT

## 2021-01-29 PROCEDURE — 25010000002 FENTANYL CITRATE (PF) 2500 MCG/50ML SOLUTION: Performed by: NURSE PRACTITIONER

## 2021-01-29 PROCEDURE — P9047 ALBUMIN (HUMAN), 25%, 50ML: HCPCS | Performed by: INTERNAL MEDICINE

## 2021-01-29 PROCEDURE — 25010000002 EPOETIN ALFA-EPBX 10000 UNIT/ML SOLUTION: Performed by: INTERNAL MEDICINE

## 2021-01-29 PROCEDURE — 94799 UNLISTED PULMONARY SVC/PX: CPT

## 2021-01-29 PROCEDURE — 25010000002 ALBUMIN HUMAN 25% PER 50 ML: Performed by: INTERNAL MEDICINE

## 2021-01-29 PROCEDURE — 85520 HEPARIN ASSAY: CPT

## 2021-01-29 PROCEDURE — 63710000001 INSULIN REGULAR HUMAN PER 5 UNITS: Performed by: NURSE PRACTITIONER

## 2021-01-29 PROCEDURE — 63710000001 INSULIN DETEMIR PER 5 UNITS: Performed by: INTERNAL MEDICINE

## 2021-01-29 PROCEDURE — 25010000002 HEPARIN (PORCINE) PER 1000 UNITS

## 2021-01-29 PROCEDURE — 25010000002 HEPARIN (PORCINE) 25000-0.45 UT/250ML-% SOLUTION

## 2021-01-29 PROCEDURE — 83735 ASSAY OF MAGNESIUM: CPT | Performed by: INTERNAL MEDICINE

## 2021-01-29 PROCEDURE — 25010000002 ALTEPLASE 2 MG RECONSTITUTED SOLUTION 1 EACH VIAL: Performed by: HOSPITALIST

## 2021-01-29 PROCEDURE — 94003 VENT MGMT INPAT SUBQ DAY: CPT

## 2021-01-29 PROCEDURE — 85025 COMPLETE CBC W/AUTO DIFF WBC: CPT | Performed by: INTERNAL MEDICINE

## 2021-01-29 RX ORDER — ALBUMIN (HUMAN) 12.5 G/50ML
12.5 SOLUTION INTRAVENOUS AS NEEDED
Status: DISPENSED | OUTPATIENT
Start: 2021-01-29 | End: 2021-01-29

## 2021-01-29 RX ORDER — HEPARIN SODIUM 1000 [USP'U]/ML
2000 INJECTION, SOLUTION INTRAVENOUS; SUBCUTANEOUS ONCE
Status: COMPLETED | OUTPATIENT
Start: 2021-01-29 | End: 2021-01-29

## 2021-01-29 RX ADMIN — SODIUM CHLORIDE, PRESERVATIVE FREE 10 ML: 5 INJECTION INTRAVENOUS at 08:01

## 2021-01-29 RX ADMIN — IPRATROPIUM BROMIDE AND ALBUTEROL SULFATE 3 ML: 2.5; .5 SOLUTION RESPIRATORY (INHALATION) at 00:41

## 2021-01-29 RX ADMIN — SERTRALINE HYDROCHLORIDE 25 MG: 25 TABLET ORAL at 08:01

## 2021-01-29 RX ADMIN — IPRATROPIUM BROMIDE AND ALBUTEROL SULFATE 3 ML: 2.5; .5 SOLUTION RESPIRATORY (INHALATION) at 13:45

## 2021-01-29 RX ADMIN — IPRATROPIUM BROMIDE AND ALBUTEROL SULFATE 3 ML: 2.5; .5 SOLUTION RESPIRATORY (INHALATION) at 19:05

## 2021-01-29 RX ADMIN — INSULIN HUMAN 3 UNITS: 100 INJECTION, SOLUTION PARENTERAL at 05:38

## 2021-01-29 RX ADMIN — MINERAL OIL: 1000 LIQUID ORAL at 15:28

## 2021-01-29 RX ADMIN — SODIUM CHLORIDE, PRESERVATIVE FREE 10 ML: 5 INJECTION INTRAVENOUS at 20:10

## 2021-01-29 RX ADMIN — WATER: 1 INJECTION INTRAMUSCULAR; INTRAVENOUS; SUBCUTANEOUS at 11:35

## 2021-01-29 RX ADMIN — TERAZOSIN HYDROCHLORIDE 1 MG: 1 CAPSULE ORAL at 20:09

## 2021-01-29 RX ADMIN — HEPARIN SODIUM 2000 UNITS: 1000 INJECTION INTRAVENOUS; SUBCUTANEOUS at 09:44

## 2021-01-29 RX ADMIN — MINERAL OIL: 1000 LIQUID ORAL at 20:07

## 2021-01-29 RX ADMIN — Medication 30 ML: at 20:10

## 2021-01-29 RX ADMIN — Medication 30 ML: at 09:47

## 2021-01-29 RX ADMIN — CHLORHEXIDINE GLUCONATE 0.12% ORAL RINSE 15 ML: 1.2 LIQUID ORAL at 20:07

## 2021-01-29 RX ADMIN — MINERAL OIL: 1000 LIQUID ORAL at 08:01

## 2021-01-29 RX ADMIN — Medication 1 TABLET: at 08:01

## 2021-01-29 RX ADMIN — ATORVASTATIN CALCIUM 80 MG: 40 TABLET, FILM COATED ORAL at 20:09

## 2021-01-29 RX ADMIN — ALBUMIN HUMAN 25 G: 0.25 SOLUTION INTRAVENOUS at 09:06

## 2021-01-29 RX ADMIN — ASPIRIN 81 MG CHEWABLE TABLET 81 MG: 81 TABLET CHEWABLE at 08:01

## 2021-01-29 RX ADMIN — FENTANYL CITRATE 100 MCG/HR: 0.05 INJECTION, SOLUTION INTRAMUSCULAR; INTRAVENOUS at 20:14

## 2021-01-29 RX ADMIN — EPOETIN ALFA-EPBX 10000 UNITS: 10000 INJECTION, SOLUTION INTRAVENOUS; SUBCUTANEOUS at 11:28

## 2021-01-29 RX ADMIN — LANSOPRAZOLE 30 MG: KIT at 05:38

## 2021-01-29 RX ADMIN — IPRATROPIUM BROMIDE AND ALBUTEROL SULFATE 3 ML: 2.5; .5 SOLUTION RESPIRATORY (INHALATION) at 07:15

## 2021-01-29 RX ADMIN — VANCOMYCIN HYDROCHLORIDE 1000 MG: 1 INJECTION, SOLUTION INTRAVENOUS at 14:37

## 2021-01-29 RX ADMIN — CARVEDILOL 25 MG: 12.5 TABLET, FILM COATED ORAL at 08:01

## 2021-01-29 RX ADMIN — CARVEDILOL 25 MG: 12.5 TABLET, FILM COATED ORAL at 18:14

## 2021-01-29 RX ADMIN — CHLORHEXIDINE GLUCONATE 0.12% ORAL RINSE 15 ML: 1.2 LIQUID ORAL at 08:01

## 2021-01-29 RX ADMIN — INSULIN DETEMIR 15 UNITS: 100 INJECTION, SOLUTION SUBCUTANEOUS at 08:00

## 2021-01-29 RX ADMIN — DOCUSATE SODIUM 100 MG: 50 LIQUID ORAL at 20:10

## 2021-01-29 RX ADMIN — SENNOSIDES 10 ML: 8.8 LIQUID ORAL at 20:08

## 2021-01-29 RX ADMIN — HEPARIN SODIUM 12 UNITS/KG/HR: 10000 INJECTION, SOLUTION INTRAVENOUS at 13:37

## 2021-01-30 LAB
ANION GAP SERPL CALCULATED.3IONS-SCNC: 10 MMOL/L (ref 5–15)
BUN SERPL-MCNC: 49 MG/DL (ref 8–23)
BUN/CREAT SERPL: 17.3 (ref 7–25)
CALCIUM SPEC-SCNC: 9.1 MG/DL (ref 8.6–10.5)
CHLORIDE SERPL-SCNC: 92 MMOL/L (ref 98–107)
CO2 SERPL-SCNC: 26 MMOL/L (ref 22–29)
CREAT SERPL-MCNC: 2.83 MG/DL (ref 0.57–1)
GFR SERPL CREATININE-BSD FRML MDRD: 20 ML/MIN/1.73
GLUCOSE BLDC GLUCOMTR-MCNC: 141 MG/DL (ref 70–130)
GLUCOSE BLDC GLUCOMTR-MCNC: 152 MG/DL (ref 70–130)
GLUCOSE BLDC GLUCOMTR-MCNC: 154 MG/DL (ref 70–130)
GLUCOSE BLDC GLUCOMTR-MCNC: 161 MG/DL (ref 70–130)
GLUCOSE BLDC GLUCOMTR-MCNC: 186 MG/DL (ref 70–130)
GLUCOSE SERPL-MCNC: 153 MG/DL (ref 65–99)
MAGNESIUM SERPL-MCNC: 1.8 MG/DL (ref 1.6–2.4)
PHOSPHATE SERPL-MCNC: 1.3 MG/DL (ref 2.5–4.5)
POTASSIUM SERPL-SCNC: 3.5 MMOL/L (ref 3.5–5.2)
SODIUM SERPL-SCNC: 128 MMOL/L (ref 136–145)
UFH PPP CHRO-ACNC: 0.32 IU/ML (ref 0.3–0.7)

## 2021-01-30 PROCEDURE — 25010000002 HEPARIN (PORCINE) 25000-0.45 UT/250ML-% SOLUTION

## 2021-01-30 PROCEDURE — 83735 ASSAY OF MAGNESIUM: CPT | Performed by: INTERNAL MEDICINE

## 2021-01-30 PROCEDURE — 94799 UNLISTED PULMONARY SVC/PX: CPT

## 2021-01-30 PROCEDURE — 80048 BASIC METABOLIC PNL TOTAL CA: CPT | Performed by: INTERNAL MEDICINE

## 2021-01-30 PROCEDURE — 84100 ASSAY OF PHOSPHORUS: CPT | Performed by: INTERNAL MEDICINE

## 2021-01-30 PROCEDURE — 63710000001 INSULIN DETEMIR PER 5 UNITS: Performed by: INTERNAL MEDICINE

## 2021-01-30 PROCEDURE — 94003 VENT MGMT INPAT SUBQ DAY: CPT

## 2021-01-30 PROCEDURE — 99233 SBSQ HOSP IP/OBS HIGH 50: CPT | Performed by: INTERNAL MEDICINE

## 2021-01-30 PROCEDURE — 85520 HEPARIN ASSAY: CPT

## 2021-01-30 PROCEDURE — 82962 GLUCOSE BLOOD TEST: CPT

## 2021-01-30 PROCEDURE — 63710000001 INSULIN REGULAR HUMAN PER 5 UNITS: Performed by: NURSE PRACTITIONER

## 2021-01-30 PROCEDURE — 25010000002 FENTANYL CITRATE (PF) 2500 MCG/50ML SOLUTION: Performed by: NURSE PRACTITIONER

## 2021-01-30 RX ADMIN — SENNOSIDES 10 ML: 8.8 LIQUID ORAL at 08:41

## 2021-01-30 RX ADMIN — CARVEDILOL 25 MG: 12.5 TABLET, FILM COATED ORAL at 08:38

## 2021-01-30 RX ADMIN — MINERAL OIL: 1000 LIQUID ORAL at 20:15

## 2021-01-30 RX ADMIN — INSULIN HUMAN 3 UNITS: 100 INJECTION, SOLUTION PARENTERAL at 12:43

## 2021-01-30 RX ADMIN — CARVEDILOL 25 MG: 12.5 TABLET, FILM COATED ORAL at 18:27

## 2021-01-30 RX ADMIN — IPRATROPIUM BROMIDE AND ALBUTEROL SULFATE 3 ML: 2.5; .5 SOLUTION RESPIRATORY (INHALATION) at 01:16

## 2021-01-30 RX ADMIN — HEPARIN SODIUM 12 UNITS/KG/HR: 10000 INJECTION, SOLUTION INTRAVENOUS at 02:32

## 2021-01-30 RX ADMIN — INSULIN HUMAN 3 UNITS: 100 INJECTION, SOLUTION PARENTERAL at 06:08

## 2021-01-30 RX ADMIN — Medication 30 ML: at 20:20

## 2021-01-30 RX ADMIN — MINERAL OIL: 1000 LIQUID ORAL at 15:46

## 2021-01-30 RX ADMIN — Medication 30 ML: at 08:49

## 2021-01-30 RX ADMIN — INSULIN HUMAN 3 UNITS: 100 INJECTION, SOLUTION PARENTERAL at 23:53

## 2021-01-30 RX ADMIN — INSULIN HUMAN 3 UNITS: 100 INJECTION, SOLUTION PARENTERAL at 00:00

## 2021-01-30 RX ADMIN — FENTANYL CITRATE 100 MCG/HR: 0.05 INJECTION, SOLUTION INTRAMUSCULAR; INTRAVENOUS at 20:15

## 2021-01-30 RX ADMIN — IPRATROPIUM BROMIDE AND ALBUTEROL SULFATE 3 ML: 2.5; .5 SOLUTION RESPIRATORY (INHALATION) at 19:00

## 2021-01-30 RX ADMIN — MINERAL OIL: 1000 LIQUID ORAL at 08:38

## 2021-01-30 RX ADMIN — INSULIN DETEMIR 15 UNITS: 100 INJECTION, SOLUTION SUBCUTANEOUS at 08:41

## 2021-01-30 RX ADMIN — TERAZOSIN HYDROCHLORIDE 1 MG: 1 CAPSULE ORAL at 20:15

## 2021-01-30 RX ADMIN — HEPARIN SODIUM 12 UNITS/KG/HR: 10000 INJECTION, SOLUTION INTRAVENOUS at 15:44

## 2021-01-30 RX ADMIN — IPRATROPIUM BROMIDE AND ALBUTEROL SULFATE 3 ML: 2.5; .5 SOLUTION RESPIRATORY (INHALATION) at 13:13

## 2021-01-30 RX ADMIN — SODIUM CHLORIDE, PRESERVATIVE FREE 10 ML: 5 INJECTION INTRAVENOUS at 08:38

## 2021-01-30 RX ADMIN — Medication 1 TABLET: at 08:38

## 2021-01-30 RX ADMIN — IPRATROPIUM BROMIDE AND ALBUTEROL SULFATE 3 ML: 2.5; .5 SOLUTION RESPIRATORY (INHALATION) at 09:24

## 2021-01-30 RX ADMIN — DOCUSATE SODIUM 100 MG: 50 LIQUID ORAL at 08:38

## 2021-01-30 RX ADMIN — CHLORHEXIDINE GLUCONATE 0.12% ORAL RINSE 15 ML: 1.2 LIQUID ORAL at 08:38

## 2021-01-30 RX ADMIN — ASPIRIN 81 MG CHEWABLE TABLET 81 MG: 81 TABLET CHEWABLE at 08:38

## 2021-01-30 RX ADMIN — POTASSIUM PHOSPHATE, MONOBASIC AND POTASSIUM PHOSPHATE, DIBASIC 30 MMOL: 224; 236 INJECTION, SOLUTION INTRAVENOUS at 15:45

## 2021-01-30 RX ADMIN — ATORVASTATIN CALCIUM 80 MG: 40 TABLET, FILM COATED ORAL at 20:15

## 2021-01-30 RX ADMIN — SERTRALINE HYDROCHLORIDE 25 MG: 25 TABLET ORAL at 08:38

## 2021-01-30 RX ADMIN — SODIUM CHLORIDE, PRESERVATIVE FREE 10 ML: 5 INJECTION INTRAVENOUS at 20:15

## 2021-01-30 RX ADMIN — LANSOPRAZOLE 30 MG: KIT at 06:09

## 2021-01-30 RX ADMIN — CHLORHEXIDINE GLUCONATE 0.12% ORAL RINSE 15 ML: 1.2 LIQUID ORAL at 20:15

## 2021-01-31 PROBLEM — G47.33 OSA (OBSTRUCTIVE SLEEP APNEA): Status: ACTIVE | Noted: 2021-01-31

## 2021-01-31 LAB
ANION GAP SERPL CALCULATED.3IONS-SCNC: 14 MMOL/L (ref 5–15)
BUN SERPL-MCNC: 69 MG/DL (ref 8–23)
BUN/CREAT SERPL: 19.2 (ref 7–25)
CALCIUM SPEC-SCNC: 9 MG/DL (ref 8.6–10.5)
CHLORIDE SERPL-SCNC: 97 MMOL/L (ref 98–107)
CO2 SERPL-SCNC: 22 MMOL/L (ref 22–29)
CREAT SERPL-MCNC: 3.59 MG/DL (ref 0.57–1)
GFR SERPL CREATININE-BSD FRML MDRD: 15 ML/MIN/1.73
GLUCOSE BLDC GLUCOMTR-MCNC: 147 MG/DL (ref 70–130)
GLUCOSE BLDC GLUCOMTR-MCNC: 150 MG/DL (ref 70–130)
GLUCOSE BLDC GLUCOMTR-MCNC: 150 MG/DL (ref 70–130)
GLUCOSE BLDC GLUCOMTR-MCNC: 182 MG/DL (ref 70–130)
GLUCOSE SERPL-MCNC: 133 MG/DL (ref 65–99)
MAGNESIUM SERPL-MCNC: 1.7 MG/DL (ref 1.6–2.4)
PHOSPHATE SERPL-MCNC: 2.6 MG/DL (ref 2.5–4.5)
POTASSIUM SERPL-SCNC: 4.1 MMOL/L (ref 3.5–5.2)
SODIUM SERPL-SCNC: 133 MMOL/L (ref 136–145)
UFH PPP CHRO-ACNC: 0.25 IU/ML (ref 0.3–0.7)
UFH PPP CHRO-ACNC: 0.49 IU/ML (ref 0.3–0.7)
UFH PPP CHRO-ACNC: 0.66 IU/ML (ref 0.3–0.7)

## 2021-01-31 PROCEDURE — 25010000002 FENTANYL CITRATE (PF) 2500 MCG/50ML SOLUTION: Performed by: NURSE PRACTITIONER

## 2021-01-31 PROCEDURE — 94799 UNLISTED PULMONARY SVC/PX: CPT

## 2021-01-31 PROCEDURE — 85520 HEPARIN ASSAY: CPT

## 2021-01-31 PROCEDURE — 94003 VENT MGMT INPAT SUBQ DAY: CPT

## 2021-01-31 PROCEDURE — 83735 ASSAY OF MAGNESIUM: CPT | Performed by: INTERNAL MEDICINE

## 2021-01-31 PROCEDURE — 84100 ASSAY OF PHOSPHORUS: CPT | Performed by: INTERNAL MEDICINE

## 2021-01-31 PROCEDURE — 63710000001 INSULIN REGULAR HUMAN PER 5 UNITS: Performed by: NURSE PRACTITIONER

## 2021-01-31 PROCEDURE — 99233 SBSQ HOSP IP/OBS HIGH 50: CPT | Performed by: INTERNAL MEDICINE

## 2021-01-31 PROCEDURE — 25010000002 HEPARIN (PORCINE) 25000-0.45 UT/250ML-% SOLUTION

## 2021-01-31 PROCEDURE — 82962 GLUCOSE BLOOD TEST: CPT

## 2021-01-31 PROCEDURE — 63710000001 INSULIN DETEMIR PER 5 UNITS: Performed by: INTERNAL MEDICINE

## 2021-01-31 PROCEDURE — 80048 BASIC METABOLIC PNL TOTAL CA: CPT | Performed by: INTERNAL MEDICINE

## 2021-01-31 RX ADMIN — CHLORHEXIDINE GLUCONATE 0.12% ORAL RINSE 15 ML: 1.2 LIQUID ORAL at 22:19

## 2021-01-31 RX ADMIN — CARVEDILOL 25 MG: 12.5 TABLET, FILM COATED ORAL at 08:27

## 2021-01-31 RX ADMIN — IPRATROPIUM BROMIDE AND ALBUTEROL SULFATE 3 ML: 2.5; .5 SOLUTION RESPIRATORY (INHALATION) at 19:21

## 2021-01-31 RX ADMIN — MINERAL OIL: 1000 LIQUID ORAL at 08:27

## 2021-01-31 RX ADMIN — HEPARIN SODIUM 12 UNITS/KG/HR: 10000 INJECTION, SOLUTION INTRAVENOUS at 05:44

## 2021-01-31 RX ADMIN — SODIUM CHLORIDE, PRESERVATIVE FREE 10 ML: 5 INJECTION INTRAVENOUS at 20:03

## 2021-01-31 RX ADMIN — SERTRALINE HYDROCHLORIDE 25 MG: 25 TABLET ORAL at 08:28

## 2021-01-31 RX ADMIN — FENTANYL CITRATE 100 MCG/HR: 0.05 INJECTION, SOLUTION INTRAMUSCULAR; INTRAVENOUS at 22:19

## 2021-01-31 RX ADMIN — SENNOSIDES 10 ML: 8.8 LIQUID ORAL at 19:55

## 2021-01-31 RX ADMIN — ASPIRIN 81 MG CHEWABLE TABLET 81 MG: 81 TABLET CHEWABLE at 08:27

## 2021-01-31 RX ADMIN — Medication 30 ML: at 19:56

## 2021-01-31 RX ADMIN — TERAZOSIN HYDROCHLORIDE 1 MG: 1 CAPSULE ORAL at 19:55

## 2021-01-31 RX ADMIN — INSULIN HUMAN 3 UNITS: 100 INJECTION, SOLUTION PARENTERAL at 05:45

## 2021-01-31 RX ADMIN — IPRATROPIUM BROMIDE AND ALBUTEROL SULFATE 3 ML: 2.5; .5 SOLUTION RESPIRATORY (INHALATION) at 07:23

## 2021-01-31 RX ADMIN — LANSOPRAZOLE 30 MG: KIT at 05:45

## 2021-01-31 RX ADMIN — IPRATROPIUM BROMIDE AND ALBUTEROL SULFATE 3 ML: 2.5; .5 SOLUTION RESPIRATORY (INHALATION) at 13:42

## 2021-01-31 RX ADMIN — Medication 1 TABLET: at 08:27

## 2021-01-31 RX ADMIN — IPRATROPIUM BROMIDE AND ALBUTEROL SULFATE 3 ML: 2.5; .5 SOLUTION RESPIRATORY (INHALATION) at 00:47

## 2021-01-31 RX ADMIN — ATORVASTATIN CALCIUM 80 MG: 40 TABLET, FILM COATED ORAL at 19:56

## 2021-01-31 RX ADMIN — SODIUM CHLORIDE, PRESERVATIVE FREE 10 ML: 5 INJECTION INTRAVENOUS at 08:28

## 2021-01-31 RX ADMIN — CARVEDILOL 25 MG: 12.5 TABLET, FILM COATED ORAL at 19:21

## 2021-01-31 RX ADMIN — INSULIN HUMAN 3 UNITS: 100 INJECTION, SOLUTION PARENTERAL at 19:21

## 2021-01-31 RX ADMIN — HEPARIN SODIUM 14 UNITS/KG/HR: 10000 INJECTION, SOLUTION INTRAVENOUS at 18:34

## 2021-01-31 RX ADMIN — DOCUSATE SODIUM 100 MG: 50 LIQUID ORAL at 08:27

## 2021-01-31 RX ADMIN — SENNOSIDES 10 ML: 8.8 LIQUID ORAL at 08:27

## 2021-01-31 RX ADMIN — MINERAL OIL: 1000 LIQUID ORAL at 19:56

## 2021-01-31 RX ADMIN — MINERAL OIL: 1000 LIQUID ORAL at 16:19

## 2021-01-31 RX ADMIN — Medication 30 ML: at 08:28

## 2021-01-31 RX ADMIN — DOCUSATE SODIUM 100 MG: 50 LIQUID ORAL at 19:55

## 2021-01-31 RX ADMIN — CHLORHEXIDINE GLUCONATE 0.12% ORAL RINSE 15 ML: 1.2 LIQUID ORAL at 08:27

## 2021-01-31 RX ADMIN — INSULIN DETEMIR 15 UNITS: 100 INJECTION, SOLUTION SUBCUTANEOUS at 08:30

## 2021-02-01 ENCOUNTER — APPOINTMENT (OUTPATIENT)
Dept: NEPHROLOGY | Facility: HOSPITAL | Age: 71
End: 2021-02-01

## 2021-02-01 ENCOUNTER — APPOINTMENT (OUTPATIENT)
Dept: GENERAL RADIOLOGY | Facility: HOSPITAL | Age: 71
End: 2021-02-01

## 2021-02-01 LAB
ALBUMIN SERPL-MCNC: 3.4 G/DL (ref 3.5–5.2)
ALP SERPL-CCNC: 94 U/L (ref 39–117)
ALT SERPL W P-5'-P-CCNC: <5 U/L (ref 1–33)
ANION GAP SERPL CALCULATED.3IONS-SCNC: 13 MMOL/L (ref 5–15)
ARTERIAL PATENCY WRIST A: ABNORMAL
AST SERPL-CCNC: 13 U/L (ref 1–32)
ATMOSPHERIC PRESS: ABNORMAL MM[HG]
BASE EXCESS BLDA CALC-SCNC: 1.8 MMOL/L (ref 0–2)
BASOPHILS # BLD AUTO: 0.03 10*3/MM3 (ref 0–0.2)
BASOPHILS NFR BLD AUTO: 0.3 % (ref 0–1.5)
BDY SITE: ABNORMAL
BILIRUB SERPL-MCNC: 0.5 MG/DL (ref 0–1.2)
BODY TEMPERATURE: 37 C
BUN SERPL-MCNC: 84 MG/DL (ref 8–23)
CALCIUM SPEC-SCNC: 9.3 MG/DL (ref 8.6–10.5)
CHLORIDE SERPL-SCNC: 94 MMOL/L (ref 98–107)
CHOLEST SERPL-MCNC: 58 MG/DL (ref 0–200)
CO2 BLDA-SCNC: 27.1 MMOL/L (ref 22–33)
CO2 SERPL-SCNC: 24 MMOL/L (ref 22–29)
COHGB MFR BLD: 1.3 % (ref 0–2)
CREAT SERPL-MCNC: 4.1 MG/DL (ref 0.57–1)
CRP SERPL-MCNC: 7.81 MG/DL (ref 0–0.5)
DEPRECATED RDW RBC AUTO: 65.1 FL (ref 37–54)
EOSINOPHIL # BLD AUTO: 0.52 10*3/MM3 (ref 0–0.4)
EOSINOPHIL NFR BLD AUTO: 5.6 % (ref 0.3–6.2)
EPAP: 0
ERYTHROCYTE [DISTWIDTH] IN BLOOD BY AUTOMATED COUNT: 17.5 % (ref 12.3–15.4)
GLUCOSE BLDC GLUCOMTR-MCNC: 151 MG/DL (ref 70–130)
GLUCOSE BLDC GLUCOMTR-MCNC: 177 MG/DL (ref 70–130)
GLUCOSE BLDC GLUCOMTR-MCNC: 217 MG/DL (ref 70–130)
GLUCOSE BLDC GLUCOMTR-MCNC: 97 MG/DL (ref 70–130)
GLUCOSE SERPL-MCNC: 130 MG/DL (ref 65–99)
HCO3 BLDA-SCNC: 25.9 MMOL/L (ref 20–26)
HCT VFR BLD AUTO: 25.3 % (ref 34–46.6)
HCT VFR BLD CALC: 22 %
HGB BLD-MCNC: 7.4 G/DL (ref 12–15.9)
HGB BLDA-MCNC: 7.2 G/DL (ref 14–18)
IMM GRANULOCYTES # BLD AUTO: 0.1 10*3/MM3 (ref 0–0.05)
IMM GRANULOCYTES NFR BLD AUTO: 1.1 % (ref 0–0.5)
INHALED O2 CONCENTRATION: 30 %
IPAP: 0
LYMPHOCYTES # BLD AUTO: 1.46 10*3/MM3 (ref 0.7–3.1)
LYMPHOCYTES NFR BLD AUTO: 15.8 % (ref 19.6–45.3)
MAGNESIUM SERPL-MCNC: 1.9 MG/DL (ref 1.6–2.4)
MCH RBC QN AUTO: 29.8 PG (ref 26.6–33)
MCHC RBC AUTO-ENTMCNC: 29.2 G/DL (ref 31.5–35.7)
MCV RBC AUTO: 102 FL (ref 79–97)
METHGB BLD QL: -0.3 % (ref 0–1.5)
MODALITY: ABNORMAL
MONOCYTES # BLD AUTO: 0.68 10*3/MM3 (ref 0.1–0.9)
MONOCYTES NFR BLD AUTO: 7.4 % (ref 5–12)
NEUTROPHILS NFR BLD AUTO: 6.45 10*3/MM3 (ref 1.7–7)
NEUTROPHILS NFR BLD AUTO: 69.8 % (ref 42.7–76)
NOTE: ABNORMAL
NRBC BLD AUTO-RTO: 0 /100 WBC (ref 0–0.2)
OXYHGB MFR BLDV: 97.9 % (ref 94–99)
PAW @ PEAK INSP FLOW SETTING VENT: 0 CMH2O
PCO2 BLDA: 37.3 MM HG (ref 35–45)
PCO2 TEMP ADJ BLD: 37.3 MM HG (ref 35–45)
PH BLDA: 7.45 PH UNITS (ref 7.35–7.45)
PH, TEMP CORRECTED: 7.45 PH UNITS
PHOSPHATE SERPL-MCNC: 3 MG/DL (ref 2.5–4.5)
PLATELET # BLD AUTO: 327 10*3/MM3 (ref 140–450)
PMV BLD AUTO: 10.9 FL (ref 6–12)
PO2 BLDA: 105 MM HG (ref 83–108)
PO2 TEMP ADJ BLD: 105 MM HG (ref 83–108)
POTASSIUM SERPL-SCNC: 3.9 MMOL/L (ref 3.5–5.2)
PREALB SERPL-MCNC: 17.6 MG/DL (ref 20–40)
PROT SERPL-MCNC: 7.1 G/DL (ref 6–8.5)
QT INTERVAL: 404 MS
QTC INTERVAL: 445 MS
RBC # BLD AUTO: 2.48 10*6/MM3 (ref 3.77–5.28)
SODIUM SERPL-SCNC: 131 MMOL/L (ref 136–145)
TOTAL RATE: 0 BREATHS/MINUTE
TRIGL SERPL-MCNC: 94 MG/DL (ref 0–150)
UFH PPP CHRO-ACNC: 0.54 IU/ML (ref 0.3–0.7)
WBC # BLD AUTO: 9.24 10*3/MM3 (ref 3.4–10.8)

## 2021-02-01 PROCEDURE — 84134 ASSAY OF PREALBUMIN: CPT | Performed by: INTERNAL MEDICINE

## 2021-02-01 PROCEDURE — 94003 VENT MGMT INPAT SUBQ DAY: CPT

## 2021-02-01 PROCEDURE — P9047 ALBUMIN (HUMAN), 25%, 50ML: HCPCS | Performed by: INTERNAL MEDICINE

## 2021-02-01 PROCEDURE — 85025 COMPLETE CBC W/AUTO DIFF WBC: CPT | Performed by: INTERNAL MEDICINE

## 2021-02-01 PROCEDURE — 63710000001 INSULIN REGULAR HUMAN PER 5 UNITS: Performed by: INTERNAL MEDICINE

## 2021-02-01 PROCEDURE — 25010000002 HEPARIN (PORCINE) 25000-0.45 UT/250ML-% SOLUTION

## 2021-02-01 PROCEDURE — 82962 GLUCOSE BLOOD TEST: CPT

## 2021-02-01 PROCEDURE — 82805 BLOOD GASES W/O2 SATURATION: CPT

## 2021-02-01 PROCEDURE — 63710000001 INSULIN DETEMIR PER 5 UNITS: Performed by: INTERNAL MEDICINE

## 2021-02-01 PROCEDURE — 71045 X-RAY EXAM CHEST 1 VIEW: CPT

## 2021-02-01 PROCEDURE — 63710000001 INSULIN REGULAR HUMAN PER 5 UNITS: Performed by: NURSE PRACTITIONER

## 2021-02-01 PROCEDURE — 93010 ELECTROCARDIOGRAM REPORT: CPT | Performed by: INTERNAL MEDICINE

## 2021-02-01 PROCEDURE — 82375 ASSAY CARBOXYHB QUANT: CPT

## 2021-02-01 PROCEDURE — 94799 UNLISTED PULMONARY SVC/PX: CPT

## 2021-02-01 PROCEDURE — 80053 COMPREHEN METABOLIC PANEL: CPT | Performed by: INTERNAL MEDICINE

## 2021-02-01 PROCEDURE — 82465 ASSAY BLD/SERUM CHOLESTEROL: CPT | Performed by: INTERNAL MEDICINE

## 2021-02-01 PROCEDURE — 25010000002 ALBUMIN HUMAN 25% PER 50 ML: Performed by: INTERNAL MEDICINE

## 2021-02-01 PROCEDURE — 84478 ASSAY OF TRIGLYCERIDES: CPT | Performed by: INTERNAL MEDICINE

## 2021-02-01 PROCEDURE — 25010000002 EPOETIN ALFA-EPBX 10000 UNIT/ML SOLUTION: Performed by: INTERNAL MEDICINE

## 2021-02-01 PROCEDURE — 84100 ASSAY OF PHOSPHORUS: CPT | Performed by: INTERNAL MEDICINE

## 2021-02-01 PROCEDURE — 86140 C-REACTIVE PROTEIN: CPT | Performed by: INTERNAL MEDICINE

## 2021-02-01 PROCEDURE — 83050 HGB METHEMOGLOBIN QUAN: CPT

## 2021-02-01 PROCEDURE — 36600 WITHDRAWAL OF ARTERIAL BLOOD: CPT

## 2021-02-01 PROCEDURE — 93005 ELECTROCARDIOGRAM TRACING: CPT | Performed by: INTERNAL MEDICINE

## 2021-02-01 PROCEDURE — 25010000002 HEPARIN (PORCINE) 25000-0.45 UT/250ML-% SOLUTION: Performed by: INTERNAL MEDICINE

## 2021-02-01 PROCEDURE — 85520 HEPARIN ASSAY: CPT

## 2021-02-01 PROCEDURE — 83735 ASSAY OF MAGNESIUM: CPT | Performed by: INTERNAL MEDICINE

## 2021-02-01 PROCEDURE — 99291 CRITICAL CARE FIRST HOUR: CPT | Performed by: INTERNAL MEDICINE

## 2021-02-01 PROCEDURE — 25010000002 HEPARIN (PORCINE) PER 1000 UNITS: Performed by: NURSE PRACTITIONER

## 2021-02-01 PROCEDURE — 25010000002 FENTANYL CITRATE (PF) 2500 MCG/50ML SOLUTION: Performed by: INTERNAL MEDICINE

## 2021-02-01 RX ORDER — ALBUMIN (HUMAN) 12.5 G/50ML
12.5 SOLUTION INTRAVENOUS AS NEEDED
Status: DISPENSED | OUTPATIENT
Start: 2021-02-01 | End: 2021-02-01

## 2021-02-01 RX ADMIN — IPRATROPIUM BROMIDE AND ALBUTEROL SULFATE 3 ML: 2.5; .5 SOLUTION RESPIRATORY (INHALATION) at 12:46

## 2021-02-01 RX ADMIN — EPOETIN ALFA-EPBX 20000 UNITS: 10000 INJECTION, SOLUTION INTRAVENOUS; SUBCUTANEOUS at 11:54

## 2021-02-01 RX ADMIN — HEPARIN SODIUM 1800 UNITS: 1000 INJECTION INTRAVENOUS; SUBCUTANEOUS at 11:53

## 2021-02-01 RX ADMIN — Medication 30 ML: at 21:12

## 2021-02-01 RX ADMIN — DOCUSATE SODIUM 100 MG: 50 LIQUID ORAL at 21:07

## 2021-02-01 RX ADMIN — TERAZOSIN HYDROCHLORIDE 1 MG: 1 CAPSULE ORAL at 21:07

## 2021-02-01 RX ADMIN — CHLORHEXIDINE GLUCONATE 0.12% ORAL RINSE 15 ML: 1.2 LIQUID ORAL at 21:07

## 2021-02-01 RX ADMIN — INSULIN HUMAN 3 UNITS: 100 INJECTION, SOLUTION PARENTERAL at 05:56

## 2021-02-01 RX ADMIN — ATORVASTATIN CALCIUM 80 MG: 40 TABLET, FILM COATED ORAL at 21:07

## 2021-02-01 RX ADMIN — LANSOPRAZOLE 30 MG: KIT at 05:56

## 2021-02-01 RX ADMIN — HEPARIN SODIUM 14 UNITS/KG/HR: 10000 INJECTION, SOLUTION INTRAVENOUS at 05:57

## 2021-02-01 RX ADMIN — IPRATROPIUM BROMIDE AND ALBUTEROL SULFATE 3 ML: 2.5; .5 SOLUTION RESPIRATORY (INHALATION) at 01:48

## 2021-02-01 RX ADMIN — Medication 1 MG/HR: at 07:57

## 2021-02-01 RX ADMIN — Medication 1 TABLET: at 12:55

## 2021-02-01 RX ADMIN — IPRATROPIUM BROMIDE AND ALBUTEROL SULFATE 3 ML: 2.5; .5 SOLUTION RESPIRATORY (INHALATION) at 19:28

## 2021-02-01 RX ADMIN — SENNOSIDES 10 ML: 8.8 LIQUID ORAL at 21:12

## 2021-02-01 RX ADMIN — DOCUSATE SODIUM 100 MG: 50 LIQUID ORAL at 12:53

## 2021-02-01 RX ADMIN — SODIUM CHLORIDE, PRESERVATIVE FREE 10 ML: 5 INJECTION INTRAVENOUS at 21:12

## 2021-02-01 RX ADMIN — CARVEDILOL 25 MG: 12.5 TABLET, FILM COATED ORAL at 18:16

## 2021-02-01 RX ADMIN — INSULIN DETEMIR 15 UNITS: 100 INJECTION, SOLUTION SUBCUTANEOUS at 11:03

## 2021-02-01 RX ADMIN — HEPARIN SODIUM 14 UNITS/KG/HR: 10000 INJECTION, SOLUTION INTRAVENOUS at 18:15

## 2021-02-01 RX ADMIN — IPRATROPIUM BROMIDE AND ALBUTEROL SULFATE 3 ML: 2.5; .5 SOLUTION RESPIRATORY (INHALATION) at 07:42

## 2021-02-01 RX ADMIN — INSULIN HUMAN 3 UNITS: 100 INJECTION, SOLUTION PARENTERAL at 18:40

## 2021-02-01 RX ADMIN — ASPIRIN 81 MG CHEWABLE TABLET 81 MG: 81 TABLET CHEWABLE at 12:55

## 2021-02-01 RX ADMIN — CHLORHEXIDINE GLUCONATE 0.12% ORAL RINSE 15 ML: 1.2 LIQUID ORAL at 12:53

## 2021-02-01 RX ADMIN — Medication 30 ML: at 12:53

## 2021-02-01 RX ADMIN — SENNOSIDES 10 ML: 8.8 LIQUID ORAL at 12:54

## 2021-02-01 RX ADMIN — SERTRALINE HYDROCHLORIDE 25 MG: 25 TABLET ORAL at 12:54

## 2021-02-01 RX ADMIN — FENTANYL CITRATE 150 MCG/HR: 0.05 INJECTION, SOLUTION INTRAMUSCULAR; INTRAVENOUS at 21:36

## 2021-02-01 RX ADMIN — WHITE PETROLATUM: 1.75 OINTMENT TOPICAL at 12:55

## 2021-02-01 RX ADMIN — ALBUMIN HUMAN 12.5 G: 0.25 SOLUTION INTRAVENOUS at 11:11

## 2021-02-02 ENCOUNTER — APPOINTMENT (OUTPATIENT)
Dept: GENERAL RADIOLOGY | Facility: HOSPITAL | Age: 71
End: 2021-02-02

## 2021-02-02 ENCOUNTER — ANESTHESIA (OUTPATIENT)
Dept: PERIOP | Facility: HOSPITAL | Age: 71
End: 2021-02-02

## 2021-02-02 ENCOUNTER — ANESTHESIA EVENT (OUTPATIENT)
Dept: PERIOP | Facility: HOSPITAL | Age: 71
End: 2021-02-02

## 2021-02-02 LAB
ALBUMIN SERPL-MCNC: 3.5 G/DL (ref 3.5–5.2)
ALBUMIN/GLOB SERPL: 0.9 G/DL
ALP SERPL-CCNC: 104 U/L (ref 39–117)
ALT SERPL W P-5'-P-CCNC: <5 U/L (ref 1–33)
ANION GAP SERPL CALCULATED.3IONS-SCNC: 11 MMOL/L (ref 5–15)
APTT PPP: 169.2 SECONDS (ref 24–37)
APTT PPP: 33 SECONDS (ref 24–37)
AST SERPL-CCNC: 13 U/L (ref 1–32)
BASOPHILS # BLD AUTO: 0.08 10*3/MM3 (ref 0–0.2)
BASOPHILS NFR BLD AUTO: 0.7 % (ref 0–1.5)
BILIRUB SERPL-MCNC: 0.5 MG/DL (ref 0–1.2)
BUN SERPL-MCNC: 47 MG/DL (ref 8–23)
BUN/CREAT SERPL: 16.5 (ref 7–25)
CALCIUM SPEC-SCNC: 9.2 MG/DL (ref 8.6–10.5)
CHLORIDE SERPL-SCNC: 95 MMOL/L (ref 98–107)
CO2 SERPL-SCNC: 26 MMOL/L (ref 22–29)
CREAT SERPL-MCNC: 2.84 MG/DL (ref 0.57–1)
DEPRECATED RDW RBC AUTO: 65.5 FL (ref 37–54)
EOSINOPHIL # BLD AUTO: 0.46 10*3/MM3 (ref 0–0.4)
EOSINOPHIL NFR BLD AUTO: 4.1 % (ref 0.3–6.2)
ERYTHROCYTE [DISTWIDTH] IN BLOOD BY AUTOMATED COUNT: 17.6 % (ref 12.3–15.4)
GFR SERPL CREATININE-BSD FRML MDRD: 20 ML/MIN/1.73
GLOBULIN UR ELPH-MCNC: 3.8 GM/DL
GLUCOSE BLDC GLUCOMTR-MCNC: 117 MG/DL (ref 70–130)
GLUCOSE BLDC GLUCOMTR-MCNC: 123 MG/DL (ref 70–130)
GLUCOSE BLDC GLUCOMTR-MCNC: 142 MG/DL (ref 70–130)
GLUCOSE BLDC GLUCOMTR-MCNC: 93 MG/DL (ref 70–130)
GLUCOSE SERPL-MCNC: 127 MG/DL (ref 65–99)
HCT VFR BLD AUTO: 26.4 % (ref 34–46.6)
HGB BLD-MCNC: 7.7 G/DL (ref 12–15.9)
IMM GRANULOCYTES # BLD AUTO: 0.2 10*3/MM3 (ref 0–0.05)
IMM GRANULOCYTES NFR BLD AUTO: 1.8 % (ref 0–0.5)
INR PPP: 1.19 (ref 0.85–1.16)
LYMPHOCYTES # BLD AUTO: 1.19 10*3/MM3 (ref 0.7–3.1)
LYMPHOCYTES NFR BLD AUTO: 10.5 % (ref 19.6–45.3)
MAGNESIUM SERPL-MCNC: 1.8 MG/DL (ref 1.6–2.4)
MCH RBC QN AUTO: 30.2 PG (ref 26.6–33)
MCHC RBC AUTO-ENTMCNC: 29.2 G/DL (ref 31.5–35.7)
MCV RBC AUTO: 103.5 FL (ref 79–97)
MONOCYTES # BLD AUTO: 0.93 10*3/MM3 (ref 0.1–0.9)
MONOCYTES NFR BLD AUTO: 8.2 % (ref 5–12)
NEUTROPHILS NFR BLD AUTO: 74.7 % (ref 42.7–76)
NEUTROPHILS NFR BLD AUTO: 8.46 10*3/MM3 (ref 1.7–7)
NRBC BLD AUTO-RTO: 0.2 /100 WBC (ref 0–0.2)
PHOSPHATE SERPL-MCNC: 1.8 MG/DL (ref 2.5–4.5)
PLATELET # BLD AUTO: 340 10*3/MM3 (ref 140–450)
PMV BLD AUTO: 10.7 FL (ref 6–12)
POTASSIUM SERPL-SCNC: 3.8 MMOL/L (ref 3.5–5.2)
PROT SERPL-MCNC: 7.3 G/DL (ref 6–8.5)
PROTHROMBIN TIME: 14.8 SECONDS (ref 11.5–14)
RBC # BLD AUTO: 2.55 10*6/MM3 (ref 3.77–5.28)
SODIUM SERPL-SCNC: 132 MMOL/L (ref 136–145)
WBC # BLD AUTO: 11.32 10*3/MM3 (ref 3.4–10.8)

## 2021-02-02 PROCEDURE — 85025 COMPLETE CBC W/AUTO DIFF WBC: CPT | Performed by: INTERNAL MEDICINE

## 2021-02-02 PROCEDURE — 25010000002 MIDAZOLAM PER 1 MG: Performed by: NURSE ANESTHETIST, CERTIFIED REGISTERED

## 2021-02-02 PROCEDURE — 71045 X-RAY EXAM CHEST 1 VIEW: CPT

## 2021-02-02 PROCEDURE — 99291 CRITICAL CARE FIRST HOUR: CPT | Performed by: INTERNAL MEDICINE

## 2021-02-02 PROCEDURE — 0B110F4 BYPASS TRACHEA TO CUTANEOUS WITH TRACHEOSTOMY DEVICE, OPEN APPROACH: ICD-10-PCS | Performed by: SURGERY

## 2021-02-02 PROCEDURE — 83735 ASSAY OF MAGNESIUM: CPT | Performed by: INTERNAL MEDICINE

## 2021-02-02 PROCEDURE — 85730 THROMBOPLASTIN TIME PARTIAL: CPT | Performed by: SURGERY

## 2021-02-02 PROCEDURE — 63710000001 INSULIN DETEMIR PER 5 UNITS: Performed by: INTERNAL MEDICINE

## 2021-02-02 PROCEDURE — 80053 COMPREHEN METABOLIC PANEL: CPT | Performed by: INTERNAL MEDICINE

## 2021-02-02 PROCEDURE — 85730 THROMBOPLASTIN TIME PARTIAL: CPT | Performed by: INTERNAL MEDICINE

## 2021-02-02 PROCEDURE — 85610 PROTHROMBIN TIME: CPT | Performed by: INTERNAL MEDICINE

## 2021-02-02 PROCEDURE — 25010000002 FENTANYL CITRATE (PF) 2500 MCG/50ML SOLUTION: Performed by: INTERNAL MEDICINE

## 2021-02-02 PROCEDURE — 94799 UNLISTED PULMONARY SVC/PX: CPT

## 2021-02-02 PROCEDURE — 63710000001 INSULIN REGULAR HUMAN PER 5 UNITS: Performed by: INTERNAL MEDICINE

## 2021-02-02 PROCEDURE — 25010000003 CEFAZOLIN PER 500 MG: Performed by: NURSE ANESTHETIST, CERTIFIED REGISTERED

## 2021-02-02 PROCEDURE — 94003 VENT MGMT INPAT SUBQ DAY: CPT

## 2021-02-02 PROCEDURE — 82962 GLUCOSE BLOOD TEST: CPT

## 2021-02-02 PROCEDURE — 84100 ASSAY OF PHOSPHORUS: CPT | Performed by: INTERNAL MEDICINE

## 2021-02-02 DEVICE — ABSORBABLE HEMOSTAT (OXIDIZED REGENERATED CELLULOSE, U.S.P.)
Type: IMPLANTABLE DEVICE | Site: NECK | Status: FUNCTIONAL
Brand: SURGICEL

## 2021-02-02 RX ORDER — NALOXONE HCL 0.4 MG/ML
0.4 VIAL (ML) INJECTION AS NEEDED
Status: DISCONTINUED | OUTPATIENT
Start: 2021-02-02 | End: 2021-02-17 | Stop reason: HOSPADM

## 2021-02-02 RX ORDER — ONDANSETRON 2 MG/ML
4 INJECTION INTRAMUSCULAR; INTRAVENOUS ONCE AS NEEDED
Status: DISCONTINUED | OUTPATIENT
Start: 2021-02-02 | End: 2021-02-05

## 2021-02-02 RX ORDER — PROMETHAZINE HYDROCHLORIDE 25 MG/1
25 TABLET ORAL ONCE AS NEEDED
Status: DISCONTINUED | OUTPATIENT
Start: 2021-02-02 | End: 2021-02-03

## 2021-02-02 RX ORDER — ROCURONIUM BROMIDE 10 MG/ML
INJECTION, SOLUTION INTRAVENOUS AS NEEDED
Status: DISCONTINUED | OUTPATIENT
Start: 2021-02-02 | End: 2021-02-02 | Stop reason: SURG

## 2021-02-02 RX ORDER — CEFAZOLIN SODIUM 1 G/3ML
INJECTION, POWDER, FOR SOLUTION INTRAMUSCULAR; INTRAVENOUS AS NEEDED
Status: DISCONTINUED | OUTPATIENT
Start: 2021-02-02 | End: 2021-02-02 | Stop reason: SURG

## 2021-02-02 RX ORDER — EPHEDRINE SULFATE 50 MG/ML
INJECTION, SOLUTION INTRAVENOUS AS NEEDED
Status: DISCONTINUED | OUTPATIENT
Start: 2021-02-02 | End: 2021-02-02 | Stop reason: SURG

## 2021-02-02 RX ORDER — IPRATROPIUM BROMIDE AND ALBUTEROL SULFATE 2.5; .5 MG/3ML; MG/3ML
3 SOLUTION RESPIRATORY (INHALATION) ONCE AS NEEDED
Status: DISCONTINUED | OUTPATIENT
Start: 2021-02-02 | End: 2021-02-06

## 2021-02-02 RX ORDER — SODIUM CHLORIDE 0.9 % (FLUSH) 0.9 %
3-10 SYRINGE (ML) INJECTION AS NEEDED
Status: DISCONTINUED | OUTPATIENT
Start: 2021-02-02 | End: 2021-02-03

## 2021-02-02 RX ORDER — PROMETHAZINE HYDROCHLORIDE 25 MG/1
25 SUPPOSITORY RECTAL ONCE AS NEEDED
Status: DISCONTINUED | OUTPATIENT
Start: 2021-02-02 | End: 2021-02-03

## 2021-02-02 RX ORDER — SODIUM CHLORIDE 0.9 % (FLUSH) 0.9 %
3 SYRINGE (ML) INJECTION EVERY 12 HOURS SCHEDULED
Status: DISCONTINUED | OUTPATIENT
Start: 2021-02-02 | End: 2021-02-03

## 2021-02-02 RX ORDER — HYDROMORPHONE HYDROCHLORIDE 1 MG/ML
0.5 INJECTION, SOLUTION INTRAMUSCULAR; INTRAVENOUS; SUBCUTANEOUS
Status: DISCONTINUED | OUTPATIENT
Start: 2021-02-02 | End: 2021-02-05

## 2021-02-02 RX ORDER — MAGNESIUM HYDROXIDE 1200 MG/15ML
LIQUID ORAL AS NEEDED
Status: DISCONTINUED | OUTPATIENT
Start: 2021-02-02 | End: 2021-02-02 | Stop reason: HOSPADM

## 2021-02-02 RX ORDER — HEPARIN SODIUM 10000 [USP'U]/100ML
13 INJECTION, SOLUTION INTRAVENOUS
Status: DISCONTINUED | OUTPATIENT
Start: 2021-02-02 | End: 2021-02-03

## 2021-02-02 RX ORDER — HYDROCODONE BITARTRATE AND ACETAMINOPHEN 5; 325 MG/1; MG/1
1 TABLET ORAL ONCE AS NEEDED
Status: DISCONTINUED | OUTPATIENT
Start: 2021-02-02 | End: 2021-02-05

## 2021-02-02 RX ORDER — MIDAZOLAM HYDROCHLORIDE 1 MG/ML
INJECTION INTRAMUSCULAR; INTRAVENOUS AS NEEDED
Status: DISCONTINUED | OUTPATIENT
Start: 2021-02-02 | End: 2021-02-02 | Stop reason: SURG

## 2021-02-02 RX ORDER — SODIUM CHLORIDE, SODIUM LACTATE, POTASSIUM CHLORIDE, CALCIUM CHLORIDE 600; 310; 30; 20 MG/100ML; MG/100ML; MG/100ML; MG/100ML
INJECTION, SOLUTION INTRAVENOUS CONTINUOUS PRN
Status: DISCONTINUED | OUTPATIENT
Start: 2021-02-02 | End: 2021-02-02 | Stop reason: SURG

## 2021-02-02 RX ORDER — BUPIVACAINE HYDROCHLORIDE AND EPINEPHRINE 2.5; 5 MG/ML; UG/ML
INJECTION, SOLUTION EPIDURAL; INFILTRATION; INTRACAUDAL; PERINEURAL AS NEEDED
Status: DISCONTINUED | OUTPATIENT
Start: 2021-02-02 | End: 2021-02-02 | Stop reason: HOSPADM

## 2021-02-02 RX ADMIN — SODIUM CHLORIDE, PRESERVATIVE FREE 10 ML: 5 INJECTION INTRAVENOUS at 08:21

## 2021-02-02 RX ADMIN — EPHEDRINE SULFATE 5 MG: 50 INJECTION, SOLUTION INTRAVENOUS at 15:03

## 2021-02-02 RX ADMIN — IPRATROPIUM BROMIDE AND ALBUTEROL SULFATE 3 ML: 2.5; .5 SOLUTION RESPIRATORY (INHALATION) at 07:35

## 2021-02-02 RX ADMIN — INSULIN DETEMIR 15 UNITS: 100 INJECTION, SOLUTION SUBCUTANEOUS at 08:18

## 2021-02-02 RX ADMIN — EPHEDRINE SULFATE 5 MG: 50 INJECTION, SOLUTION INTRAVENOUS at 15:07

## 2021-02-02 RX ADMIN — CEFAZOLIN SODIUM 3 G: 1 INJECTION, POWDER, FOR SOLUTION INTRAMUSCULAR; INTRAVENOUS at 15:01

## 2021-02-02 RX ADMIN — WHITE PETROLATUM: 1.75 OINTMENT TOPICAL at 08:19

## 2021-02-02 RX ADMIN — MIDAZOLAM HYDROCHLORIDE 2 MG: 1 INJECTION, SOLUTION INTRAMUSCULAR; INTRAVENOUS at 14:53

## 2021-02-02 RX ADMIN — ROCURONIUM BROMIDE 50 MG: 10 INJECTION INTRAVENOUS at 15:01

## 2021-02-02 RX ADMIN — ATORVASTATIN CALCIUM 80 MG: 40 TABLET, FILM COATED ORAL at 20:33

## 2021-02-02 RX ADMIN — INSULIN HUMAN 5 UNITS: 100 INJECTION, SOLUTION PARENTERAL at 00:05

## 2021-02-02 RX ADMIN — TERAZOSIN HYDROCHLORIDE 1 MG: 1 CAPSULE ORAL at 20:32

## 2021-02-02 RX ADMIN — POTASSIUM PHOSPHATE, MONOBASIC AND POTASSIUM PHOSPHATE, DIBASIC 15 MMOL: 224; 236 INJECTION, SOLUTION, CONCENTRATE INTRAVENOUS at 08:19

## 2021-02-02 RX ADMIN — IPRATROPIUM BROMIDE AND ALBUTEROL SULFATE 3 ML: 2.5; .5 SOLUTION RESPIRATORY (INHALATION) at 19:51

## 2021-02-02 RX ADMIN — SERTRALINE HYDROCHLORIDE 25 MG: 25 TABLET ORAL at 08:19

## 2021-02-02 RX ADMIN — CARVEDILOL 25 MG: 12.5 TABLET, FILM COATED ORAL at 08:19

## 2021-02-02 RX ADMIN — CHLORHEXIDINE GLUCONATE 0.12% ORAL RINSE 15 ML: 1.2 LIQUID ORAL at 08:19

## 2021-02-02 RX ADMIN — ASPIRIN 81 MG CHEWABLE TABLET 81 MG: 81 TABLET CHEWABLE at 08:19

## 2021-02-02 RX ADMIN — LANSOPRAZOLE 30 MG: KIT at 06:28

## 2021-02-02 RX ADMIN — SODIUM CHLORIDE, POTASSIUM CHLORIDE, SODIUM LACTATE AND CALCIUM CHLORIDE: 600; 310; 30; 20 INJECTION, SOLUTION INTRAVENOUS at 14:53

## 2021-02-02 RX ADMIN — CHLORHEXIDINE GLUCONATE 0.12% ORAL RINSE 15 ML: 1.2 LIQUID ORAL at 20:32

## 2021-02-02 RX ADMIN — Medication 1 TABLET: at 08:19

## 2021-02-02 RX ADMIN — FENTANYL CITRATE 150 MCG/HR: 0.05 INJECTION, SOLUTION INTRAMUSCULAR; INTRAVENOUS at 14:35

## 2021-02-02 RX ADMIN — Medication 30 ML: at 20:36

## 2021-02-02 RX ADMIN — Medication 30 ML: at 08:23

## 2021-02-02 RX ADMIN — SODIUM CHLORIDE, PRESERVATIVE FREE 10 ML: 5 INJECTION INTRAVENOUS at 20:33

## 2021-02-02 RX ADMIN — CARVEDILOL 25 MG: 12.5 TABLET, FILM COATED ORAL at 18:16

## 2021-02-02 RX ADMIN — EPHEDRINE SULFATE 5 MG: 50 INJECTION, SOLUTION INTRAVENOUS at 14:58

## 2021-02-02 RX ADMIN — IPRATROPIUM BROMIDE AND ALBUTEROL SULFATE 3 ML: 2.5; .5 SOLUTION RESPIRATORY (INHALATION) at 01:48

## 2021-02-02 NOTE — ANESTHESIA POSTPROCEDURE EVALUATION
Patient: Joy Bueno    Procedure Summary     Date: 02/02/21 Room / Location:  [facility] /  [facility]    Anesthesia Start: 1453 Anesthesia Stop: 1558    Procedure: TRACHEOSTOMY (N/A ) Diagnosis:     Surgeon: Kehinde Dasilva MD Provider: Graciela Murphy DO    Anesthesia Type: general ASA Status: 4          Anesthesia Type: general    Vitals  Vitals Value Taken Time   BP     Temp     Pulse     Resp     SpO2 100 % 02/02/21 1557   Vitals shown include unvalidated device data.        Post Anesthesia Care and Evaluation    Patient location during evaluation: ICU  Patient participation: complete - patient cannot participate  Level of consciousness: obtunded/minimal responses  Pain score: 0  Pain management: adequate  Airway patency: patent  Anesthetic complications: No anesthetic complications  PONV Status: none  Cardiovascular status: hemodynamically stable and acceptable  Respiratory status: trach and ventilator  Hydration status: acceptable    Comments: Handoff given to ICU RN at bedside

## 2021-02-02 NOTE — ANESTHESIA PREPROCEDURE EVALUATION
Anesthesia Evaluation                  Airway   Dental      Pulmonary    (+) pneumonia , asthma,recent URI, sleep apnea,   Cardiovascular     ECG reviewed    (+) hypertension, CAD, CHF , DVT,       Neuro/Psych  GI/Hepatic/Renal/Endo    (+) morbid obesity, GERD,  renal disease, diabetes mellitus,     Musculoskeletal     Abdominal    Substance History      OB/GYN          Other                        Anesthesia Plan    ASA 4     general     inhalational induction   Postoperative Plan: Expected vent after surgery  Anesthetic plan, all risks, benefits, and alternatives have been provided, discussed and informed consent has been obtained with: legal guardian.    Plan discussed with CRNA.

## 2021-02-03 ENCOUNTER — APPOINTMENT (OUTPATIENT)
Dept: GENERAL RADIOLOGY | Facility: HOSPITAL | Age: 71
End: 2021-02-03

## 2021-02-03 ENCOUNTER — APPOINTMENT (OUTPATIENT)
Dept: NEPHROLOGY | Facility: HOSPITAL | Age: 71
End: 2021-02-03

## 2021-02-03 LAB
ANION GAP SERPL CALCULATED.3IONS-SCNC: 14 MMOL/L (ref 5–15)
ARTERIAL PATENCY WRIST A: ABNORMAL
ATMOSPHERIC PRESS: ABNORMAL MM[HG]
BASE EXCESS BLDA CALC-SCNC: 0.7 MMOL/L (ref 0–2)
BASOPHILS # BLD AUTO: 0.06 10*3/MM3 (ref 0–0.2)
BASOPHILS NFR BLD AUTO: 0.5 % (ref 0–1.5)
BDY SITE: ABNORMAL
BODY TEMPERATURE: 37 C
BUN SERPL-MCNC: 62 MG/DL (ref 8–23)
BUN/CREAT SERPL: 17.6 (ref 7–25)
CALCIUM SPEC-SCNC: 9.4 MG/DL (ref 8.6–10.5)
CHLORIDE SERPL-SCNC: 96 MMOL/L (ref 98–107)
CO2 BLDA-SCNC: 27 MMOL/L (ref 22–33)
CO2 SERPL-SCNC: 24 MMOL/L (ref 22–29)
COHGB MFR BLD: 1.3 % (ref 0–2)
CREAT SERPL-MCNC: 3.52 MG/DL (ref 0.57–1)
DEPRECATED RDW RBC AUTO: 67.9 FL (ref 37–54)
EOSINOPHIL # BLD AUTO: 0.45 10*3/MM3 (ref 0–0.4)
EOSINOPHIL NFR BLD AUTO: 3.9 % (ref 0.3–6.2)
EPAP: 0
ERYTHROCYTE [DISTWIDTH] IN BLOOD BY AUTOMATED COUNT: 17.6 % (ref 12.3–15.4)
GFR SERPL CREATININE-BSD FRML MDRD: 16 ML/MIN/1.73
GLUCOSE BLDC GLUCOMTR-MCNC: 137 MG/DL (ref 70–130)
GLUCOSE BLDC GLUCOMTR-MCNC: 138 MG/DL (ref 70–130)
GLUCOSE SERPL-MCNC: 131 MG/DL (ref 65–99)
HCO3 BLDA-SCNC: 25.7 MMOL/L (ref 20–26)
HCT VFR BLD AUTO: 27.7 % (ref 34–46.6)
HCT VFR BLD CALC: 23.8 %
HGB BLD-MCNC: 7.9 G/DL (ref 12–15.9)
HGB BLDA-MCNC: 7.8 G/DL (ref 14–18)
IMM GRANULOCYTES # BLD AUTO: 0.19 10*3/MM3 (ref 0–0.05)
IMM GRANULOCYTES NFR BLD AUTO: 1.7 % (ref 0–0.5)
INHALED O2 CONCENTRATION: 30 %
IPAP: 0
LYMPHOCYTES # BLD AUTO: 1.11 10*3/MM3 (ref 0.7–3.1)
LYMPHOCYTES NFR BLD AUTO: 9.7 % (ref 19.6–45.3)
MAGNESIUM SERPL-MCNC: 1.9 MG/DL (ref 1.6–2.4)
MCH RBC QN AUTO: 30 PG (ref 26.6–33)
MCHC RBC AUTO-ENTMCNC: 28.5 G/DL (ref 31.5–35.7)
MCV RBC AUTO: 105.3 FL (ref 79–97)
METHGB BLD QL: 0.8 % (ref 0–1.5)
MODALITY: ABNORMAL
MONOCYTES # BLD AUTO: 0.9 10*3/MM3 (ref 0.1–0.9)
MONOCYTES NFR BLD AUTO: 7.8 % (ref 5–12)
NEUTROPHILS NFR BLD AUTO: 76.4 % (ref 42.7–76)
NEUTROPHILS NFR BLD AUTO: 8.76 10*3/MM3 (ref 1.7–7)
NOTE: ABNORMAL
NRBC BLD AUTO-RTO: 0.3 /100 WBC (ref 0–0.2)
OXYHGB MFR BLDV: 95.5 % (ref 94–99)
PAW @ PEAK INSP FLOW SETTING VENT: 0 CMH2O
PCO2 BLDA: 42.3 MM HG (ref 35–45)
PCO2 TEMP ADJ BLD: 42.3 MM HG (ref 35–45)
PEEP RESPIRATORY: 6 CM[H2O]
PH BLDA: 7.39 PH UNITS (ref 7.35–7.45)
PH, TEMP CORRECTED: 7.39 PH UNITS
PHOSPHATE SERPL-MCNC: 3.3 MG/DL (ref 2.5–4.5)
PLATELET # BLD AUTO: 347 10*3/MM3 (ref 140–450)
PMV BLD AUTO: 10.7 FL (ref 6–12)
PO2 BLDA: 89.5 MM HG (ref 83–108)
PO2 TEMP ADJ BLD: 89.5 MM HG (ref 83–108)
POTASSIUM SERPL-SCNC: 4.1 MMOL/L (ref 3.5–5.2)
RBC # BLD AUTO: 2.63 10*6/MM3 (ref 3.77–5.28)
SODIUM SERPL-SCNC: 134 MMOL/L (ref 136–145)
TOTAL RATE: 0 BREATHS/MINUTE
UFH PPP CHRO-ACNC: 0.1 IU/ML (ref 0.3–0.7)
UFH PPP CHRO-ACNC: 0.42 IU/ML (ref 0.3–0.7)
VENTILATOR MODE: ABNORMAL
WBC # BLD AUTO: 11.47 10*3/MM3 (ref 3.4–10.8)

## 2021-02-03 PROCEDURE — 71045 X-RAY EXAM CHEST 1 VIEW: CPT

## 2021-02-03 PROCEDURE — 36600 WITHDRAWAL OF ARTERIAL BLOOD: CPT

## 2021-02-03 PROCEDURE — 84100 ASSAY OF PHOSPHORUS: CPT | Performed by: SURGERY

## 2021-02-03 PROCEDURE — 94799 UNLISTED PULMONARY SVC/PX: CPT

## 2021-02-03 PROCEDURE — 25010000002 HEPARIN (PORCINE) PER 1000 UNITS: Performed by: INTERNAL MEDICINE

## 2021-02-03 PROCEDURE — 94003 VENT MGMT INPAT SUBQ DAY: CPT

## 2021-02-03 PROCEDURE — 25010000002 ALBUMIN HUMAN 25% PER 50 ML: Performed by: INTERNAL MEDICINE

## 2021-02-03 PROCEDURE — 99233 SBSQ HOSP IP/OBS HIGH 50: CPT | Performed by: INTERNAL MEDICINE

## 2021-02-03 PROCEDURE — 25010000002 EPOETIN ALFA-EPBX 10000 UNIT/ML SOLUTION: Performed by: SURGERY

## 2021-02-03 PROCEDURE — 83050 HGB METHEMOGLOBIN QUAN: CPT

## 2021-02-03 PROCEDURE — 85520 HEPARIN ASSAY: CPT

## 2021-02-03 PROCEDURE — 82805 BLOOD GASES W/O2 SATURATION: CPT

## 2021-02-03 PROCEDURE — P9047 ALBUMIN (HUMAN), 25%, 50ML: HCPCS | Performed by: INTERNAL MEDICINE

## 2021-02-03 PROCEDURE — 85025 COMPLETE CBC W/AUTO DIFF WBC: CPT | Performed by: INTERNAL MEDICINE

## 2021-02-03 PROCEDURE — 82962 GLUCOSE BLOOD TEST: CPT

## 2021-02-03 PROCEDURE — 80048 BASIC METABOLIC PNL TOTAL CA: CPT | Performed by: SURGERY

## 2021-02-03 PROCEDURE — 25010000002 FENTANYL CITRATE (PF) 2500 MCG/50ML SOLUTION: Performed by: SURGERY

## 2021-02-03 PROCEDURE — 25010000002 HEPARIN (PORCINE) 25000-0.45 UT/250ML-% SOLUTION: Performed by: NURSE PRACTITIONER

## 2021-02-03 PROCEDURE — 63710000001 INSULIN DETEMIR PER 5 UNITS: Performed by: SURGERY

## 2021-02-03 PROCEDURE — 63710000001 INSULIN REGULAR HUMAN PER 5 UNITS: Performed by: SURGERY

## 2021-02-03 PROCEDURE — 82375 ASSAY CARBOXYHB QUANT: CPT

## 2021-02-03 PROCEDURE — 83735 ASSAY OF MAGNESIUM: CPT | Performed by: SURGERY

## 2021-02-03 RX ORDER — IPRATROPIUM BROMIDE AND ALBUTEROL SULFATE 2.5; .5 MG/3ML; MG/3ML
3 SOLUTION RESPIRATORY (INHALATION)
Status: DISCONTINUED | OUTPATIENT
Start: 2021-02-03 | End: 2021-02-06

## 2021-02-03 RX ORDER — ALBUMIN (HUMAN) 12.5 G/50ML
12.5 SOLUTION INTRAVENOUS AS NEEDED
Status: DISPENSED | OUTPATIENT
Start: 2021-02-03 | End: 2021-02-03

## 2021-02-03 RX ORDER — HEPARIN SODIUM 5000 [USP'U]/ML
5000 INJECTION, SOLUTION INTRAVENOUS; SUBCUTANEOUS EVERY 8 HOURS SCHEDULED
Status: DISCONTINUED | OUTPATIENT
Start: 2021-02-03 | End: 2021-02-09

## 2021-02-03 RX ADMIN — DOCUSATE SODIUM 100 MG: 50 LIQUID ORAL at 12:40

## 2021-02-03 RX ADMIN — SERTRALINE HYDROCHLORIDE 25 MG: 25 TABLET ORAL at 12:41

## 2021-02-03 RX ADMIN — Medication 30 ML: at 17:56

## 2021-02-03 RX ADMIN — CARVEDILOL 25 MG: 12.5 TABLET, FILM COATED ORAL at 17:46

## 2021-02-03 RX ADMIN — INSULIN HUMAN 5 UNITS: 100 INJECTION, SOLUTION PARENTERAL at 17:45

## 2021-02-03 RX ADMIN — IPRATROPIUM BROMIDE AND ALBUTEROL SULFATE 3 ML: 2.5; .5 SOLUTION RESPIRATORY (INHALATION) at 01:26

## 2021-02-03 RX ADMIN — INSULIN DETEMIR 15 UNITS: 100 INJECTION, SOLUTION SUBCUTANEOUS at 12:52

## 2021-02-03 RX ADMIN — IPRATROPIUM BROMIDE AND ALBUTEROL SULFATE 3 ML: 2.5; .5 SOLUTION RESPIRATORY (INHALATION) at 13:59

## 2021-02-03 RX ADMIN — LANSOPRAZOLE 30 MG: KIT at 05:34

## 2021-02-03 RX ADMIN — HEPARIN SODIUM 5000 UNITS: 5000 INJECTION, SOLUTION INTRAVENOUS; SUBCUTANEOUS at 15:09

## 2021-02-03 RX ADMIN — SENNOSIDES 10 ML: 8.8 LIQUID ORAL at 12:40

## 2021-02-03 RX ADMIN — HEPARIN SODIUM 13 UNITS/KG/HR: 10000 INJECTION, SOLUTION INTRAVENOUS at 00:19

## 2021-02-03 RX ADMIN — ATORVASTATIN CALCIUM 80 MG: 40 TABLET, FILM COATED ORAL at 21:15

## 2021-02-03 RX ADMIN — IPRATROPIUM BROMIDE AND ALBUTEROL SULFATE 3 ML: 2.5; .5 SOLUTION RESPIRATORY (INHALATION) at 07:26

## 2021-02-03 RX ADMIN — CHLORHEXIDINE GLUCONATE 0.12% ORAL RINSE 15 ML: 1.2 LIQUID ORAL at 21:15

## 2021-02-03 RX ADMIN — CHLORHEXIDINE GLUCONATE 0.12% ORAL RINSE 15 ML: 1.2 LIQUID ORAL at 12:45

## 2021-02-03 RX ADMIN — SODIUM CHLORIDE, PRESERVATIVE FREE 10 ML: 5 INJECTION INTRAVENOUS at 12:45

## 2021-02-03 RX ADMIN — FENTANYL CITRATE 150 MCG/HR: 0.05 INJECTION, SOLUTION INTRAMUSCULAR; INTRAVENOUS at 08:13

## 2021-02-03 RX ADMIN — IPRATROPIUM BROMIDE AND ALBUTEROL SULFATE 3 ML: 2.5; .5 SOLUTION RESPIRATORY (INHALATION) at 19:11

## 2021-02-03 RX ADMIN — SENNOSIDES 10 ML: 8.8 LIQUID ORAL at 21:16

## 2021-02-03 RX ADMIN — HEPARIN SODIUM 5000 UNITS: 5000 INJECTION, SOLUTION INTRAVENOUS; SUBCUTANEOUS at 21:15

## 2021-02-03 RX ADMIN — Medication 1 TABLET: at 12:41

## 2021-02-03 RX ADMIN — ALBUMIN HUMAN 12.5 G: 0.25 SOLUTION INTRAVENOUS at 10:10

## 2021-02-03 RX ADMIN — Medication 30 ML: at 21:16

## 2021-02-03 RX ADMIN — DOCUSATE SODIUM 100 MG: 50 LIQUID ORAL at 21:16

## 2021-02-03 RX ADMIN — IPRATROPIUM BROMIDE AND ALBUTEROL SULFATE 3 ML: 2.5; .5 SOLUTION RESPIRATORY (INHALATION) at 23:41

## 2021-02-03 RX ADMIN — EPOETIN ALFA-EPBX 20000 UNITS: 10000 INJECTION, SOLUTION INTRAVENOUS; SUBCUTANEOUS at 11:21

## 2021-02-03 RX ADMIN — TERAZOSIN HYDROCHLORIDE 1 MG: 1 CAPSULE ORAL at 21:15

## 2021-02-03 RX ADMIN — ASPIRIN 81 MG CHEWABLE TABLET 81 MG: 81 TABLET CHEWABLE at 12:42

## 2021-02-04 LAB
ANION GAP SERPL CALCULATED.3IONS-SCNC: 11 MMOL/L (ref 5–15)
ARTERIAL PATENCY WRIST A: ABNORMAL
ATMOSPHERIC PRESS: ABNORMAL MM[HG]
BASE EXCESS BLDA CALC-SCNC: 4.4 MMOL/L (ref 0–2)
BASOPHILS # BLD MANUAL: 0 10*3/MM3 (ref 0–0.2)
BASOPHILS NFR BLD AUTO: 0 % (ref 0–1.5)
BDY SITE: ABNORMAL
BODY TEMPERATURE: 37 C
BUN SERPL-MCNC: 42 MG/DL (ref 8–23)
BUN/CREAT SERPL: 15.9 (ref 7–25)
CALCIUM SPEC-SCNC: 9.2 MG/DL (ref 8.6–10.5)
CHLORIDE SERPL-SCNC: 95 MMOL/L (ref 98–107)
CO2 BLDA-SCNC: 29.3 MMOL/L (ref 22–33)
CO2 SERPL-SCNC: 26 MMOL/L (ref 22–29)
COHGB MFR BLD: 1.3 % (ref 0–2)
CREAT SERPL-MCNC: 2.64 MG/DL (ref 0.57–1)
DEPRECATED RDW RBC AUTO: 67.4 FL (ref 37–54)
EOSINOPHIL # BLD MANUAL: 0.78 10*3/MM3 (ref 0–0.4)
EOSINOPHIL NFR BLD MANUAL: 7 % (ref 0.3–6.2)
ERYTHROCYTE [DISTWIDTH] IN BLOOD BY AUTOMATED COUNT: 17.4 % (ref 12.3–15.4)
GFR SERPL CREATININE-BSD FRML MDRD: 22 ML/MIN/1.73
GLUCOSE BLDC GLUCOMTR-MCNC: 171 MG/DL (ref 70–130)
GLUCOSE BLDC GLUCOMTR-MCNC: 188 MG/DL (ref 70–130)
GLUCOSE BLDC GLUCOMTR-MCNC: 188 MG/DL (ref 70–130)
GLUCOSE BLDC GLUCOMTR-MCNC: 212 MG/DL (ref 70–130)
GLUCOSE SERPL-MCNC: 173 MG/DL (ref 65–99)
HCO3 BLDA-SCNC: 28.2 MMOL/L (ref 20–26)
HCT VFR BLD AUTO: 27.2 % (ref 34–46.6)
HCT VFR BLD CALC: 23.5 %
HGB BLD-MCNC: 7.8 G/DL (ref 12–15.9)
HGB BLDA-MCNC: 7.7 G/DL (ref 14–18)
INHALED O2 CONCENTRATION: 30 %
LYMPHOCYTES # BLD MANUAL: 1.23 10*3/MM3 (ref 0.7–3.1)
LYMPHOCYTES NFR BLD MANUAL: 11 % (ref 19.6–45.3)
LYMPHOCYTES NFR BLD MANUAL: 6 % (ref 5–12)
MACROCYTES BLD QL SMEAR: ABNORMAL
MAGNESIUM SERPL-MCNC: 1.9 MG/DL (ref 1.6–2.4)
MCH RBC QN AUTO: 30.4 PG (ref 26.6–33)
MCHC RBC AUTO-ENTMCNC: 28.7 G/DL (ref 31.5–35.7)
MCV RBC AUTO: 105.8 FL (ref 79–97)
METAMYELOCYTES NFR BLD MANUAL: 7 % (ref 0–0)
METHGB BLD QL: 0.6 % (ref 0–1.5)
MODALITY: ABNORMAL
MONOCYTES # BLD AUTO: 0.67 10*3/MM3 (ref 0.1–0.9)
MYELOCYTES NFR BLD MANUAL: 3 % (ref 0–0)
NEUTROPHILS # BLD AUTO: 7.39 10*3/MM3 (ref 1.7–7)
NEUTROPHILS NFR BLD MANUAL: 42 % (ref 42.7–76)
NEUTS BAND NFR BLD MANUAL: 24 % (ref 0–5)
NOTE: ABNORMAL
NRBC SPEC MANUAL: 2 /100 WBC (ref 0–0.2)
OXYHGB MFR BLDV: 97 % (ref 94–99)
PCO2 BLDA: 37.7 MM HG (ref 35–45)
PCO2 TEMP ADJ BLD: 37.7 MM HG (ref 35–45)
PEEP RESPIRATORY: 6 CM[H2O]
PH BLDA: 7.48 PH UNITS (ref 7.35–7.45)
PH, TEMP CORRECTED: 7.48 PH UNITS
PHOSPHATE SERPL-MCNC: 1.7 MG/DL (ref 2.5–4.5)
PLAT MORPH BLD: NORMAL
PLATELET # BLD AUTO: 337 10*3/MM3 (ref 140–450)
PMV BLD AUTO: 11.1 FL (ref 6–12)
PO2 BLDA: 104 MM HG (ref 83–108)
PO2 TEMP ADJ BLD: 104 MM HG (ref 83–108)
POTASSIUM SERPL-SCNC: 3.7 MMOL/L (ref 3.5–5.2)
RBC # BLD AUTO: 2.57 10*6/MM3 (ref 3.77–5.28)
SODIUM SERPL-SCNC: 132 MMOL/L (ref 136–145)
TSH SERPL DL<=0.05 MIU/L-ACNC: 1.55 UIU/ML (ref 0.27–4.2)
VENTILATOR MODE: ABNORMAL
WBC # BLD AUTO: 11.2 10*3/MM3 (ref 3.4–10.8)
WBC MORPH BLD: NORMAL

## 2021-02-04 PROCEDURE — 80048 BASIC METABOLIC PNL TOTAL CA: CPT | Performed by: SURGERY

## 2021-02-04 PROCEDURE — 94003 VENT MGMT INPAT SUBQ DAY: CPT

## 2021-02-04 PROCEDURE — 25010000002 HEPARIN (PORCINE) PER 1000 UNITS: Performed by: INTERNAL MEDICINE

## 2021-02-04 PROCEDURE — 84100 ASSAY OF PHOSPHORUS: CPT | Performed by: SURGERY

## 2021-02-04 PROCEDURE — 82962 GLUCOSE BLOOD TEST: CPT

## 2021-02-04 PROCEDURE — 94799 UNLISTED PULMONARY SVC/PX: CPT

## 2021-02-04 PROCEDURE — 83050 HGB METHEMOGLOBIN QUAN: CPT

## 2021-02-04 PROCEDURE — 25010000002 FENTANYL CITRATE (PF) 2500 MCG/50ML SOLUTION: Performed by: SURGERY

## 2021-02-04 PROCEDURE — 63710000001 INSULIN DETEMIR PER 5 UNITS: Performed by: SURGERY

## 2021-02-04 PROCEDURE — 85007 BL SMEAR W/DIFF WBC COUNT: CPT | Performed by: INTERNAL MEDICINE

## 2021-02-04 PROCEDURE — 82805 BLOOD GASES W/O2 SATURATION: CPT

## 2021-02-04 PROCEDURE — 85025 COMPLETE CBC W/AUTO DIFF WBC: CPT | Performed by: INTERNAL MEDICINE

## 2021-02-04 PROCEDURE — 99233 SBSQ HOSP IP/OBS HIGH 50: CPT | Performed by: INTERNAL MEDICINE

## 2021-02-04 PROCEDURE — 36600 WITHDRAWAL OF ARTERIAL BLOOD: CPT

## 2021-02-04 PROCEDURE — 63710000001 INSULIN REGULAR HUMAN PER 5 UNITS: Performed by: SURGERY

## 2021-02-04 PROCEDURE — 83735 ASSAY OF MAGNESIUM: CPT | Performed by: SURGERY

## 2021-02-04 PROCEDURE — 82375 ASSAY CARBOXYHB QUANT: CPT

## 2021-02-04 PROCEDURE — 25010000002 MAGNESIUM SULFATE 2 GM/50ML SOLUTION: Performed by: SURGERY

## 2021-02-04 PROCEDURE — 84443 ASSAY THYROID STIM HORMONE: CPT | Performed by: INTERNAL MEDICINE

## 2021-02-04 RX ADMIN — CHLORHEXIDINE GLUCONATE 0.12% ORAL RINSE 15 ML: 1.2 LIQUID ORAL at 12:58

## 2021-02-04 RX ADMIN — SODIUM CHLORIDE, PRESERVATIVE FREE 10 ML: 5 INJECTION INTRAVENOUS at 08:30

## 2021-02-04 RX ADMIN — HEPARIN SODIUM 5000 UNITS: 5000 INJECTION, SOLUTION INTRAVENOUS; SUBCUTANEOUS at 21:51

## 2021-02-04 RX ADMIN — HEPARIN SODIUM 5000 UNITS: 5000 INJECTION, SOLUTION INTRAVENOUS; SUBCUTANEOUS at 05:38

## 2021-02-04 RX ADMIN — Medication 30 ML: at 20:17

## 2021-02-04 RX ADMIN — INSULIN HUMAN 5 UNITS: 100 INJECTION, SOLUTION PARENTERAL at 12:56

## 2021-02-04 RX ADMIN — INSULIN HUMAN 3 UNITS: 100 INJECTION, SOLUTION PARENTERAL at 17:42

## 2021-02-04 RX ADMIN — Medication 30 ML: at 12:58

## 2021-02-04 RX ADMIN — INSULIN HUMAN 3 UNITS: 100 INJECTION, SOLUTION PARENTERAL at 01:05

## 2021-02-04 RX ADMIN — INSULIN DETEMIR 15 UNITS: 100 INJECTION, SOLUTION SUBCUTANEOUS at 08:30

## 2021-02-04 RX ADMIN — HEPARIN SODIUM 5000 UNITS: 5000 INJECTION, SOLUTION INTRAVENOUS; SUBCUTANEOUS at 13:01

## 2021-02-04 RX ADMIN — INSULIN HUMAN 3 UNITS: 100 INJECTION, SOLUTION PARENTERAL at 05:38

## 2021-02-04 RX ADMIN — CARVEDILOL 25 MG: 12.5 TABLET, FILM COATED ORAL at 08:29

## 2021-02-04 RX ADMIN — ATORVASTATIN CALCIUM 80 MG: 40 TABLET, FILM COATED ORAL at 20:17

## 2021-02-04 RX ADMIN — IPRATROPIUM BROMIDE AND ALBUTEROL SULFATE 3 ML: 2.5; .5 SOLUTION RESPIRATORY (INHALATION) at 20:21

## 2021-02-04 RX ADMIN — POTASSIUM PHOSPHATE, MONOBASIC AND POTASSIUM PHOSPHATE, DIBASIC 30 MMOL: 224; 236 INJECTION, SOLUTION INTRAVENOUS at 08:38

## 2021-02-04 RX ADMIN — LANSOPRAZOLE 30 MG: KIT at 05:39

## 2021-02-04 RX ADMIN — MAGNESIUM SULFATE HEPTAHYDRATE 2 G: 2 INJECTION, SOLUTION INTRAVENOUS at 17:42

## 2021-02-04 RX ADMIN — CHLORHEXIDINE GLUCONATE 0.12% ORAL RINSE 15 ML: 1.2 LIQUID ORAL at 20:17

## 2021-02-04 RX ADMIN — IPRATROPIUM BROMIDE AND ALBUTEROL SULFATE 3 ML: 2.5; .5 SOLUTION RESPIRATORY (INHALATION) at 08:13

## 2021-02-04 RX ADMIN — ASPIRIN 81 MG CHEWABLE TABLET 81 MG: 81 TABLET CHEWABLE at 08:29

## 2021-02-04 RX ADMIN — Medication 1 TABLET: at 08:29

## 2021-02-04 RX ADMIN — CARVEDILOL 25 MG: 12.5 TABLET, FILM COATED ORAL at 17:42

## 2021-02-04 RX ADMIN — TERAZOSIN HYDROCHLORIDE 1 MG: 1 CAPSULE ORAL at 20:17

## 2021-02-04 RX ADMIN — SERTRALINE HYDROCHLORIDE 25 MG: 25 TABLET ORAL at 08:30

## 2021-02-04 RX ADMIN — FENTANYL CITRATE 50 MCG/HR: 0.05 INJECTION, SOLUTION INTRAMUSCULAR; INTRAVENOUS at 08:33

## 2021-02-05 ENCOUNTER — APPOINTMENT (OUTPATIENT)
Dept: CARDIOLOGY | Facility: HOSPITAL | Age: 71
End: 2021-02-05

## 2021-02-05 ENCOUNTER — APPOINTMENT (OUTPATIENT)
Dept: NEPHROLOGY | Facility: HOSPITAL | Age: 71
End: 2021-02-05

## 2021-02-05 LAB
ANION GAP SERPL CALCULATED.3IONS-SCNC: 11 MMOL/L (ref 5–15)
BH CV ECHO MEAS - BSA(HAYCOCK): 2.9 M^2
BH CV ECHO MEAS - BSA: 2.6 M^2
BH CV ECHO MEAS - BZI_BMI: 64.2 KILOGRAMS/M^2
BH CV ECHO MEAS - BZI_METRIC_HEIGHT: 162.6 CM
BH CV ECHO MEAS - BZI_METRIC_WEIGHT: 169.6 KG
BUN SERPL-MCNC: 63 MG/DL (ref 8–23)
BUN/CREAT SERPL: 17.7 (ref 7–25)
CALCIUM SPEC-SCNC: 9.2 MG/DL (ref 8.6–10.5)
CHLORIDE SERPL-SCNC: 96 MMOL/L (ref 98–107)
CO2 SERPL-SCNC: 25 MMOL/L (ref 22–29)
CREAT SERPL-MCNC: 3.55 MG/DL (ref 0.57–1)
GFR SERPL CREATININE-BSD FRML MDRD: 15 ML/MIN/1.73
GLUCOSE BLDC GLUCOMTR-MCNC: 169 MG/DL (ref 70–130)
GLUCOSE BLDC GLUCOMTR-MCNC: 170 MG/DL (ref 70–130)
GLUCOSE BLDC GLUCOMTR-MCNC: 198 MG/DL (ref 70–130)
GLUCOSE SERPL-MCNC: 153 MG/DL (ref 65–99)
MAGNESIUM SERPL-MCNC: 2.2 MG/DL (ref 1.6–2.4)
PHOSPHATE SERPL-MCNC: 3.6 MG/DL (ref 2.5–4.5)
POTASSIUM SERPL-SCNC: 3.8 MMOL/L (ref 3.5–5.2)
SODIUM SERPL-SCNC: 132 MMOL/L (ref 136–145)

## 2021-02-05 PROCEDURE — 87186 SC STD MICRODIL/AGAR DIL: CPT | Performed by: INTERNAL MEDICINE

## 2021-02-05 PROCEDURE — 87070 CULTURE OTHR SPECIMN AEROBIC: CPT | Performed by: INTERNAL MEDICINE

## 2021-02-05 PROCEDURE — P9047 ALBUMIN (HUMAN), 25%, 50ML: HCPCS | Performed by: INTERNAL MEDICINE

## 2021-02-05 PROCEDURE — 82962 GLUCOSE BLOOD TEST: CPT

## 2021-02-05 PROCEDURE — 97110 THERAPEUTIC EXERCISES: CPT

## 2021-02-05 PROCEDURE — 97168 OT RE-EVAL EST PLAN CARE: CPT

## 2021-02-05 PROCEDURE — 93971 EXTREMITY STUDY: CPT

## 2021-02-05 PROCEDURE — 94003 VENT MGMT INPAT SUBQ DAY: CPT

## 2021-02-05 PROCEDURE — 94799 UNLISTED PULMONARY SVC/PX: CPT

## 2021-02-05 PROCEDURE — 25010000002 HEPARIN (PORCINE) PER 1000 UNITS: Performed by: INTERNAL MEDICINE

## 2021-02-05 PROCEDURE — 93971 EXTREMITY STUDY: CPT | Performed by: INTERNAL MEDICINE

## 2021-02-05 PROCEDURE — 80048 BASIC METABOLIC PNL TOTAL CA: CPT | Performed by: SURGERY

## 2021-02-05 PROCEDURE — 87070 CULTURE OTHR SPECIMN AEROBIC: CPT | Performed by: SURGERY

## 2021-02-05 PROCEDURE — 63710000001 INSULIN DETEMIR PER 5 UNITS: Performed by: SURGERY

## 2021-02-05 PROCEDURE — 25010000002 EPOETIN ALFA-EPBX 10000 UNIT/ML SOLUTION: Performed by: SURGERY

## 2021-02-05 PROCEDURE — 84100 ASSAY OF PHOSPHORUS: CPT | Performed by: SURGERY

## 2021-02-05 PROCEDURE — 99233 SBSQ HOSP IP/OBS HIGH 50: CPT | Performed by: INTERNAL MEDICINE

## 2021-02-05 PROCEDURE — 25010000002 ALBUMIN HUMAN 25% PER 50 ML: Performed by: INTERNAL MEDICINE

## 2021-02-05 PROCEDURE — 63710000001 INSULIN REGULAR HUMAN PER 5 UNITS: Performed by: SURGERY

## 2021-02-05 PROCEDURE — 83735 ASSAY OF MAGNESIUM: CPT | Performed by: SURGERY

## 2021-02-05 PROCEDURE — 02PYX3Z REMOVAL OF INFUSION DEVICE FROM GREAT VESSEL, EXTERNAL APPROACH: ICD-10-PCS | Performed by: SURGERY

## 2021-02-05 PROCEDURE — 97164 PT RE-EVAL EST PLAN CARE: CPT

## 2021-02-05 RX ORDER — OXYCODONE HCL 5 MG/5 ML
5 SOLUTION, ORAL ORAL EVERY 6 HOURS PRN
Status: DISCONTINUED | OUTPATIENT
Start: 2021-02-05 | End: 2021-02-17 | Stop reason: HOSPADM

## 2021-02-05 RX ORDER — DIAPER,BRIEF,INFANT-TODD,DISP
EACH MISCELLANEOUS EVERY 12 HOURS SCHEDULED
Status: DISCONTINUED | OUTPATIENT
Start: 2021-02-05 | End: 2021-02-17 | Stop reason: HOSPADM

## 2021-02-05 RX ORDER — LIDOCAINE HYDROCHLORIDE 10 MG/ML
5 INJECTION, SOLUTION INFILTRATION; PERINEURAL ONCE
Status: COMPLETED | OUTPATIENT
Start: 2021-02-05 | End: 2021-02-05

## 2021-02-05 RX ORDER — ALBUMIN (HUMAN) 12.5 G/50ML
12.5 SOLUTION INTRAVENOUS AS NEEDED
Status: DISPENSED | OUTPATIENT
Start: 2021-02-05 | End: 2021-02-05

## 2021-02-05 RX ORDER — HYDROMORPHONE HYDROCHLORIDE 1 MG/ML
0.5 INJECTION, SOLUTION INTRAMUSCULAR; INTRAVENOUS; SUBCUTANEOUS ONCE
Status: COMPLETED | OUTPATIENT
Start: 2021-02-06 | End: 2021-02-05

## 2021-02-05 RX ORDER — LIDOCAINE HYDROCHLORIDE 10 MG/ML
INJECTION, SOLUTION EPIDURAL; INFILTRATION; INTRACAUDAL; PERINEURAL
Status: COMPLETED
Start: 2021-02-05 | End: 2021-02-05

## 2021-02-05 RX ADMIN — SERTRALINE HYDROCHLORIDE 25 MG: 25 TABLET ORAL at 12:50

## 2021-02-05 RX ADMIN — LIDOCAINE HYDROCHLORIDE 5 ML: 10 INJECTION, SOLUTION EPIDURAL; INFILTRATION; INTRACAUDAL; PERINEURAL at 17:43

## 2021-02-05 RX ADMIN — IPRATROPIUM BROMIDE AND ALBUTEROL SULFATE 3 ML: 2.5; .5 SOLUTION RESPIRATORY (INHALATION) at 00:21

## 2021-02-05 RX ADMIN — INSULIN DETEMIR 15 UNITS: 100 INJECTION, SOLUTION SUBCUTANEOUS at 12:54

## 2021-02-05 RX ADMIN — CARVEDILOL 25 MG: 12.5 TABLET, FILM COATED ORAL at 18:08

## 2021-02-05 RX ADMIN — CHLORHEXIDINE GLUCONATE 0.12% ORAL RINSE 15 ML: 1.2 LIQUID ORAL at 12:50

## 2021-02-05 RX ADMIN — INSULIN HUMAN 3 UNITS: 100 INJECTION, SOLUTION PARENTERAL at 01:17

## 2021-02-05 RX ADMIN — Medication 30 ML: at 20:32

## 2021-02-05 RX ADMIN — LANSOPRAZOLE 30 MG: KIT at 05:33

## 2021-02-05 RX ADMIN — HEPARIN SODIUM 5000 UNITS: 5000 INJECTION, SOLUTION INTRAVENOUS; SUBCUTANEOUS at 13:02

## 2021-02-05 RX ADMIN — SODIUM CHLORIDE, PRESERVATIVE FREE 10 ML: 5 INJECTION INTRAVENOUS at 20:33

## 2021-02-05 RX ADMIN — IPRATROPIUM BROMIDE AND ALBUTEROL SULFATE 3 ML: 2.5; .5 SOLUTION RESPIRATORY (INHALATION) at 15:17

## 2021-02-05 RX ADMIN — IPRATROPIUM BROMIDE AND ALBUTEROL SULFATE 3 ML: 2.5; .5 SOLUTION RESPIRATORY (INHALATION) at 06:55

## 2021-02-05 RX ADMIN — SODIUM CHLORIDE, PRESERVATIVE FREE 10 ML: 5 INJECTION INTRAVENOUS at 12:51

## 2021-02-05 RX ADMIN — HYDROMORPHONE HYDROCHLORIDE 0.5 MG: 1 INJECTION, SOLUTION INTRAMUSCULAR; INTRAVENOUS; SUBCUTANEOUS at 23:45

## 2021-02-05 RX ADMIN — Medication 1 TABLET: at 12:51

## 2021-02-05 RX ADMIN — OXYCODONE HYDROCHLORIDE 5 MG: 5 SOLUTION ORAL at 22:54

## 2021-02-05 RX ADMIN — LIDOCAINE HYDROCHLORIDE 5 ML: 10 INJECTION, SOLUTION INFILTRATION; PERINEURAL at 17:43

## 2021-02-05 RX ADMIN — CHLORHEXIDINE GLUCONATE 0.12% ORAL RINSE 15 ML: 1.2 LIQUID ORAL at 20:31

## 2021-02-05 RX ADMIN — HEPARIN SODIUM 5000 UNITS: 5000 INJECTION, SOLUTION INTRAVENOUS; SUBCUTANEOUS at 22:00

## 2021-02-05 RX ADMIN — ATORVASTATIN CALCIUM 80 MG: 40 TABLET, FILM COATED ORAL at 20:32

## 2021-02-05 RX ADMIN — IPRATROPIUM BROMIDE AND ALBUTEROL SULFATE 3 ML: 2.5; .5 SOLUTION RESPIRATORY (INHALATION) at 23:39

## 2021-02-05 RX ADMIN — IPRATROPIUM BROMIDE AND ALBUTEROL SULFATE 3 ML: 2.5; .5 SOLUTION RESPIRATORY (INHALATION) at 11:38

## 2021-02-05 RX ADMIN — HEPARIN SODIUM 5000 UNITS: 5000 INJECTION, SOLUTION INTRAVENOUS; SUBCUTANEOUS at 05:33

## 2021-02-05 RX ADMIN — SENNOSIDES 10 ML: 8.8 LIQUID ORAL at 20:31

## 2021-02-05 RX ADMIN — SENNOSIDES 10 ML: 8.8 LIQUID ORAL at 12:50

## 2021-02-05 RX ADMIN — IPRATROPIUM BROMIDE AND ALBUTEROL SULFATE 3 ML: 2.5; .5 SOLUTION RESPIRATORY (INHALATION) at 03:11

## 2021-02-05 RX ADMIN — TERAZOSIN HYDROCHLORIDE 1 MG: 1 CAPSULE ORAL at 20:32

## 2021-02-05 RX ADMIN — Medication 30 ML: at 13:12

## 2021-02-05 RX ADMIN — INSULIN HUMAN 3 UNITS: 100 INJECTION, SOLUTION PARENTERAL at 18:08

## 2021-02-05 RX ADMIN — ALBUMIN HUMAN 12.5 G: 0.25 SOLUTION INTRAVENOUS at 09:09

## 2021-02-05 RX ADMIN — ASPIRIN 81 MG CHEWABLE TABLET 81 MG: 81 TABLET CHEWABLE at 12:50

## 2021-02-05 RX ADMIN — ACETAMINOPHEN ORAL SOLUTION 500 MG: 650 SOLUTION ORAL at 22:53

## 2021-02-05 RX ADMIN — EPOETIN ALFA-EPBX 20000 UNITS: 10000 INJECTION, SOLUTION INTRAVENOUS; SUBCUTANEOUS at 11:23

## 2021-02-05 RX ADMIN — DOCUSATE SODIUM 100 MG: 50 LIQUID ORAL at 20:33

## 2021-02-05 RX ADMIN — INSULIN HUMAN 3 UNITS: 100 INJECTION, SOLUTION PARENTERAL at 05:41

## 2021-02-05 RX ADMIN — HYDROCORTISONE 1 APPLICATION: 1 CREAM TOPICAL at 20:28

## 2021-02-05 RX ADMIN — DOCUSATE SODIUM 100 MG: 50 LIQUID ORAL at 12:50

## 2021-02-05 RX ADMIN — IPRATROPIUM BROMIDE AND ALBUTEROL SULFATE 3 ML: 2.5; .5 SOLUTION RESPIRATORY (INHALATION) at 19:25

## 2021-02-05 RX ADMIN — HYDROCORTISONE: 1 CREAM TOPICAL at 13:13

## 2021-02-06 LAB
ANION GAP SERPL CALCULATED.3IONS-SCNC: 12 MMOL/L (ref 5–15)
ARTERIAL PATENCY WRIST A: ABNORMAL
ATMOSPHERIC PRESS: ABNORMAL MM[HG]
BASE EXCESS BLDA CALC-SCNC: 4 MMOL/L (ref 0–2)
BDY SITE: ABNORMAL
BODY TEMPERATURE: 37 C
BUN SERPL-MCNC: 41 MG/DL (ref 8–23)
BUN/CREAT SERPL: 15.7 (ref 7–25)
CALCIUM SPEC-SCNC: 8.9 MG/DL (ref 8.6–10.5)
CHLORIDE SERPL-SCNC: 96 MMOL/L (ref 98–107)
CO2 BLDA-SCNC: 28.6 MMOL/L (ref 22–33)
CO2 SERPL-SCNC: 24 MMOL/L (ref 22–29)
COHGB MFR BLD: 1.1 % (ref 0–2)
CORTIS SERPL-MCNC: 8.35 MCG/DL
CREAT SERPL-MCNC: 2.61 MG/DL (ref 0.57–1)
D-LACTATE SERPL-SCNC: 1 MMOL/L (ref 0.5–2)
EPAP: 0
GFR SERPL CREATININE-BSD FRML MDRD: 22 ML/MIN/1.73
GLUCOSE BLDC GLUCOMTR-MCNC: 135 MG/DL (ref 70–130)
GLUCOSE BLDC GLUCOMTR-MCNC: 150 MG/DL (ref 70–130)
GLUCOSE BLDC GLUCOMTR-MCNC: 150 MG/DL (ref 70–130)
GLUCOSE BLDC GLUCOMTR-MCNC: 168 MG/DL (ref 70–130)
GLUCOSE BLDC GLUCOMTR-MCNC: 172 MG/DL (ref 70–130)
GLUCOSE BLDC GLUCOMTR-MCNC: 173 MG/DL (ref 70–130)
GLUCOSE BLDC GLUCOMTR-MCNC: 211 MG/DL (ref 70–130)
GLUCOSE SERPL-MCNC: 153 MG/DL (ref 65–99)
HCO3 BLDA-SCNC: 27.5 MMOL/L (ref 20–26)
HCT VFR BLD CALC: 22.5 %
HGB BLDA-MCNC: 7.3 G/DL (ref 14–18)
INHALED O2 CONCENTRATION: 30 %
IPAP: 0
MAGNESIUM SERPL-MCNC: 2 MG/DL (ref 1.6–2.4)
METHGB BLD QL: 0.7 % (ref 0–1.5)
MODALITY: ABNORMAL
NOTE: ABNORMAL
OXYHGB MFR BLDV: 97.8 % (ref 94–99)
PAW @ PEAK INSP FLOW SETTING VENT: 0 CMH2O
PCO2 BLDA: 35.5 MM HG (ref 35–45)
PCO2 TEMP ADJ BLD: 35.5 MM HG (ref 35–45)
PH BLDA: 7.5 PH UNITS (ref 7.35–7.45)
PH, TEMP CORRECTED: 7.5 PH UNITS
PHOSPHATE SERPL-MCNC: 2.1 MG/DL (ref 2.5–4.5)
PO2 BLDA: 133 MM HG (ref 83–108)
PO2 TEMP ADJ BLD: 133 MM HG (ref 83–108)
POTASSIUM SERPL-SCNC: 4.2 MMOL/L (ref 3.5–5.2)
SODIUM SERPL-SCNC: 132 MMOL/L (ref 136–145)
TOTAL RATE: 0 BREATHS/MINUTE

## 2021-02-06 PROCEDURE — 94799 UNLISTED PULMONARY SVC/PX: CPT

## 2021-02-06 PROCEDURE — 82533 TOTAL CORTISOL: CPT | Performed by: NURSE PRACTITIONER

## 2021-02-06 PROCEDURE — P9041 ALBUMIN (HUMAN),5%, 50ML: HCPCS

## 2021-02-06 PROCEDURE — 83050 HGB METHEMOGLOBIN QUAN: CPT

## 2021-02-06 PROCEDURE — 82962 GLUCOSE BLOOD TEST: CPT

## 2021-02-06 PROCEDURE — 25010000002 HYDROMORPHONE PER 4 MG: Performed by: NURSE PRACTITIONER

## 2021-02-06 PROCEDURE — 99291 CRITICAL CARE FIRST HOUR: CPT | Performed by: INTERNAL MEDICINE

## 2021-02-06 PROCEDURE — 83735 ASSAY OF MAGNESIUM: CPT | Performed by: SURGERY

## 2021-02-06 PROCEDURE — 82805 BLOOD GASES W/O2 SATURATION: CPT

## 2021-02-06 PROCEDURE — 83605 ASSAY OF LACTIC ACID: CPT | Performed by: NURSE PRACTITIONER

## 2021-02-06 PROCEDURE — 80048 BASIC METABOLIC PNL TOTAL CA: CPT | Performed by: SURGERY

## 2021-02-06 PROCEDURE — 84100 ASSAY OF PHOSPHORUS: CPT | Performed by: SURGERY

## 2021-02-06 PROCEDURE — 82375 ASSAY CARBOXYHB QUANT: CPT

## 2021-02-06 PROCEDURE — 25010000002 ALBUMIN HUMAN 5% PER 50 ML

## 2021-02-06 PROCEDURE — 63710000001 INSULIN REGULAR HUMAN PER 5 UNITS: Performed by: SURGERY

## 2021-02-06 PROCEDURE — 36600 WITHDRAWAL OF ARTERIAL BLOOD: CPT

## 2021-02-06 PROCEDURE — 63710000001 INSULIN DETEMIR PER 5 UNITS: Performed by: SURGERY

## 2021-02-06 PROCEDURE — 25010000002 HEPARIN (PORCINE) PER 1000 UNITS: Performed by: INTERNAL MEDICINE

## 2021-02-06 PROCEDURE — 94003 VENT MGMT INPAT SUBQ DAY: CPT

## 2021-02-06 RX ORDER — IPRATROPIUM BROMIDE AND ALBUTEROL SULFATE 2.5; .5 MG/3ML; MG/3ML
3 SOLUTION RESPIRATORY (INHALATION) EVERY 4 HOURS PRN
Status: DISCONTINUED | OUTPATIENT
Start: 2021-02-06 | End: 2021-02-10 | Stop reason: SDUPTHER

## 2021-02-06 RX ORDER — ALBUMIN, HUMAN INJ 5% 5 %
SOLUTION INTRAVENOUS
Status: COMPLETED
Start: 2021-02-06 | End: 2021-02-06

## 2021-02-06 RX ORDER — MIDODRINE HYDROCHLORIDE 5 MG/1
5 TABLET ORAL EVERY 8 HOURS
Status: DISCONTINUED | OUTPATIENT
Start: 2021-02-06 | End: 2021-02-08

## 2021-02-06 RX ORDER — IPRATROPIUM BROMIDE AND ALBUTEROL SULFATE 2.5; .5 MG/3ML; MG/3ML
3 SOLUTION RESPIRATORY (INHALATION)
Status: DISCONTINUED | OUTPATIENT
Start: 2021-02-06 | End: 2021-02-10

## 2021-02-06 RX ADMIN — Medication 30 ML: at 20:17

## 2021-02-06 RX ADMIN — ALBUMIN HUMAN: 0.05 INJECTION, SOLUTION INTRAVENOUS at 04:53

## 2021-02-06 RX ADMIN — HEPARIN SODIUM 5000 UNITS: 5000 INJECTION, SOLUTION INTRAVENOUS; SUBCUTANEOUS at 14:12

## 2021-02-06 RX ADMIN — IPRATROPIUM BROMIDE AND ALBUTEROL SULFATE 3 ML: 2.5; .5 SOLUTION RESPIRATORY (INHALATION) at 03:25

## 2021-02-06 RX ADMIN — SERTRALINE HYDROCHLORIDE 25 MG: 25 TABLET ORAL at 08:26

## 2021-02-06 RX ADMIN — SODIUM CHLORIDE, POTASSIUM CHLORIDE, SODIUM LACTATE AND CALCIUM CHLORIDE 500 ML: 600; 310; 30; 20 INJECTION, SOLUTION INTRAVENOUS at 02:07

## 2021-02-06 RX ADMIN — INSULIN HUMAN 3 UNITS: 100 INJECTION, SOLUTION PARENTERAL at 05:59

## 2021-02-06 RX ADMIN — INSULIN HUMAN 5 UNITS: 100 INJECTION, SOLUTION PARENTERAL at 00:59

## 2021-02-06 RX ADMIN — OXYCODONE HYDROCHLORIDE 5 MG: 5 SOLUTION ORAL at 16:33

## 2021-02-06 RX ADMIN — HYDROCORTISONE: 1 CREAM TOPICAL at 20:16

## 2021-02-06 RX ADMIN — SODIUM PHOSPHATE, MONOBASIC, MONOHYDRATE AND SODIUM PHOSPHATE, DIBASIC, ANHYDROUS 15 MMOL: 276; 142 INJECTION, SOLUTION INTRAVENOUS at 08:27

## 2021-02-06 RX ADMIN — CARVEDILOL 25 MG: 12.5 TABLET, FILM COATED ORAL at 18:00

## 2021-02-06 RX ADMIN — ASPIRIN 81 MG CHEWABLE TABLET 81 MG: 81 TABLET CHEWABLE at 08:26

## 2021-02-06 RX ADMIN — Medication 30 ML: at 10:11

## 2021-02-06 RX ADMIN — ATORVASTATIN CALCIUM 80 MG: 40 TABLET, FILM COATED ORAL at 20:16

## 2021-02-06 RX ADMIN — INSULIN HUMAN 3 UNITS: 100 INJECTION, SOLUTION PARENTERAL at 11:29

## 2021-02-06 RX ADMIN — CHLORHEXIDINE GLUCONATE 0.12% ORAL RINSE 15 ML: 1.2 LIQUID ORAL at 20:16

## 2021-02-06 RX ADMIN — HEPARIN SODIUM 5000 UNITS: 5000 INJECTION, SOLUTION INTRAVENOUS; SUBCUTANEOUS at 05:59

## 2021-02-06 RX ADMIN — CARVEDILOL 25 MG: 12.5 TABLET, FILM COATED ORAL at 08:26

## 2021-02-06 RX ADMIN — IPRATROPIUM BROMIDE AND ALBUTEROL SULFATE 3 ML: 2.5; .5 SOLUTION RESPIRATORY (INHALATION) at 07:48

## 2021-02-06 RX ADMIN — MIDODRINE HYDROCHLORIDE 5 MG: 5 TABLET ORAL at 04:55

## 2021-02-06 RX ADMIN — IPRATROPIUM BROMIDE AND ALBUTEROL SULFATE 3 ML: 2.5; .5 SOLUTION RESPIRATORY (INHALATION) at 12:23

## 2021-02-06 RX ADMIN — LANSOPRAZOLE 30 MG: KIT at 05:59

## 2021-02-06 RX ADMIN — CHLORHEXIDINE GLUCONATE 0.12% ORAL RINSE 15 ML: 1.2 LIQUID ORAL at 08:27

## 2021-02-06 RX ADMIN — INSULIN HUMAN 3 UNITS: 100 INJECTION, SOLUTION PARENTERAL at 18:00

## 2021-02-06 RX ADMIN — HYDROCORTISONE: 1 CREAM TOPICAL at 10:12

## 2021-02-06 RX ADMIN — SODIUM CHLORIDE, PRESERVATIVE FREE 10 ML: 5 INJECTION INTRAVENOUS at 08:27

## 2021-02-06 RX ADMIN — Medication 1 TABLET: at 08:26

## 2021-02-06 RX ADMIN — IPRATROPIUM BROMIDE AND ALBUTEROL SULFATE 3 ML: 2.5; .5 SOLUTION RESPIRATORY (INHALATION) at 15:23

## 2021-02-06 RX ADMIN — HEPARIN SODIUM 5000 UNITS: 5000 INJECTION, SOLUTION INTRAVENOUS; SUBCUTANEOUS at 21:55

## 2021-02-06 RX ADMIN — IPRATROPIUM BROMIDE AND ALBUTEROL SULFATE 3 ML: 2.5; .5 SOLUTION RESPIRATORY (INHALATION) at 19:31

## 2021-02-06 RX ADMIN — TERAZOSIN HYDROCHLORIDE 1 MG: 1 CAPSULE ORAL at 20:16

## 2021-02-06 RX ADMIN — INSULIN DETEMIR 15 UNITS: 100 INJECTION, SOLUTION SUBCUTANEOUS at 08:27

## 2021-02-06 RX ADMIN — MIDODRINE HYDROCHLORIDE 5 MG: 5 TABLET ORAL at 14:12

## 2021-02-06 RX ADMIN — MIDODRINE HYDROCHLORIDE 5 MG: 5 TABLET ORAL at 21:55

## 2021-02-07 ENCOUNTER — APPOINTMENT (OUTPATIENT)
Dept: GENERAL RADIOLOGY | Facility: HOSPITAL | Age: 71
End: 2021-02-07

## 2021-02-07 LAB
ABO GROUP BLD: NORMAL
ANION GAP SERPL CALCULATED.3IONS-SCNC: 10 MMOL/L (ref 5–15)
BASOPHILS # BLD MANUAL: 0.09 10*3/MM3 (ref 0–0.2)
BASOPHILS NFR BLD AUTO: 1 % (ref 0–1.5)
BLD GP AB SCN SERPL QL: NEGATIVE
BUN SERPL-MCNC: 56 MG/DL (ref 8–23)
BUN/CREAT SERPL: 18.2 (ref 7–25)
CALCIUM SPEC-SCNC: 8.9 MG/DL (ref 8.6–10.5)
CATHETER CULTURE: ABNORMAL
CATHETER CULTURE: NORMAL
CHLORIDE SERPL-SCNC: 98 MMOL/L (ref 98–107)
CO2 SERPL-SCNC: 25 MMOL/L (ref 22–29)
CREAT SERPL-MCNC: 3.08 MG/DL (ref 0.57–1)
DEPRECATED RDW RBC AUTO: 68.3 FL (ref 37–54)
EOSINOPHIL # BLD MANUAL: 0.6 10*3/MM3 (ref 0–0.4)
EOSINOPHIL NFR BLD MANUAL: 7 % (ref 0.3–6.2)
ERYTHROCYTE [DISTWIDTH] IN BLOOD BY AUTOMATED COUNT: 17.7 % (ref 12.3–15.4)
GFR SERPL CREATININE-BSD FRML MDRD: 18 ML/MIN/1.73
GLUCOSE BLDC GLUCOMTR-MCNC: 148 MG/DL (ref 70–130)
GLUCOSE BLDC GLUCOMTR-MCNC: 156 MG/DL (ref 70–130)
GLUCOSE BLDC GLUCOMTR-MCNC: 157 MG/DL (ref 70–130)
GLUCOSE BLDC GLUCOMTR-MCNC: 161 MG/DL (ref 70–130)
GLUCOSE SERPL-MCNC: 169 MG/DL (ref 65–99)
HCT VFR BLD AUTO: 24.1 % (ref 34–46.6)
HCT VFR BLD AUTO: 24.1 % (ref 34–46.6)
HGB BLD-MCNC: 6.7 G/DL (ref 12–15.9)
HGB BLD-MCNC: 6.9 G/DL (ref 12–15.9)
LYMPHOCYTES # BLD MANUAL: 1.21 10*3/MM3 (ref 0.7–3.1)
LYMPHOCYTES NFR BLD MANUAL: 14 % (ref 19.6–45.3)
LYMPHOCYTES NFR BLD MANUAL: 3 % (ref 5–12)
MACROCYTES BLD QL SMEAR: ABNORMAL
MAGNESIUM SERPL-MCNC: 1.9 MG/DL (ref 1.6–2.4)
MCH RBC QN AUTO: 30 PG (ref 26.6–33)
MCHC RBC AUTO-ENTMCNC: 27.8 G/DL (ref 31.5–35.7)
MCV RBC AUTO: 108.1 FL (ref 79–97)
METAMYELOCYTES NFR BLD MANUAL: 2 % (ref 0–0)
MONOCYTES # BLD AUTO: 0.26 10*3/MM3 (ref 0.1–0.9)
NEUTROPHILS # BLD AUTO: 6.29 10*3/MM3 (ref 1.7–7)
NEUTROPHILS NFR BLD MANUAL: 35 % (ref 42.7–76)
NEUTS BAND NFR BLD MANUAL: 38 % (ref 0–5)
PHOSPHATE SERPL-MCNC: 3 MG/DL (ref 2.5–4.5)
PLAT MORPH BLD: NORMAL
PLATELET # BLD AUTO: 307 10*3/MM3 (ref 140–450)
PMV BLD AUTO: 10.4 FL (ref 6–12)
POLYCHROMASIA BLD QL SMEAR: ABNORMAL
POTASSIUM SERPL-SCNC: 4 MMOL/L (ref 3.5–5.2)
RBC # BLD AUTO: 2.23 10*6/MM3 (ref 3.77–5.28)
RH BLD: POSITIVE
SODIUM SERPL-SCNC: 133 MMOL/L (ref 136–145)
T&S EXPIRATION DATE: NORMAL
WBC # BLD AUTO: 8.61 10*3/MM3 (ref 3.4–10.8)
WBC MORPH BLD: NORMAL

## 2021-02-07 PROCEDURE — 83735 ASSAY OF MAGNESIUM: CPT | Performed by: SURGERY

## 2021-02-07 PROCEDURE — 63710000001 INSULIN DETEMIR PER 5 UNITS: Performed by: SURGERY

## 2021-02-07 PROCEDURE — 84100 ASSAY OF PHOSPHORUS: CPT | Performed by: SURGERY

## 2021-02-07 PROCEDURE — 25010000002 MAGNESIUM SULFATE 2 GM/50ML SOLUTION: Performed by: SURGERY

## 2021-02-07 PROCEDURE — 94003 VENT MGMT INPAT SUBQ DAY: CPT

## 2021-02-07 PROCEDURE — 86850 RBC ANTIBODY SCREEN: CPT | Performed by: NURSE PRACTITIONER

## 2021-02-07 PROCEDURE — 25010000002 HEPARIN (PORCINE) PER 1000 UNITS: Performed by: INTERNAL MEDICINE

## 2021-02-07 PROCEDURE — 82962 GLUCOSE BLOOD TEST: CPT

## 2021-02-07 PROCEDURE — 86900 BLOOD TYPING SEROLOGIC ABO: CPT | Performed by: NURSE PRACTITIONER

## 2021-02-07 PROCEDURE — 94799 UNLISTED PULMONARY SVC/PX: CPT

## 2021-02-07 PROCEDURE — 85018 HEMOGLOBIN: CPT | Performed by: NURSE PRACTITIONER

## 2021-02-07 PROCEDURE — 25010000002 PIPERACILLIN SOD-TAZOBACTAM PER 1 G: Performed by: INTERNAL MEDICINE

## 2021-02-07 PROCEDURE — 85014 HEMATOCRIT: CPT | Performed by: NURSE PRACTITIONER

## 2021-02-07 PROCEDURE — 71045 X-RAY EXAM CHEST 1 VIEW: CPT

## 2021-02-07 PROCEDURE — 85007 BL SMEAR W/DIFF WBC COUNT: CPT | Performed by: INTERNAL MEDICINE

## 2021-02-07 PROCEDURE — 85025 COMPLETE CBC W/AUTO DIFF WBC: CPT | Performed by: INTERNAL MEDICINE

## 2021-02-07 PROCEDURE — 86901 BLOOD TYPING SEROLOGIC RH(D): CPT | Performed by: NURSE PRACTITIONER

## 2021-02-07 PROCEDURE — 80048 BASIC METABOLIC PNL TOTAL CA: CPT | Performed by: SURGERY

## 2021-02-07 PROCEDURE — 99233 SBSQ HOSP IP/OBS HIGH 50: CPT | Performed by: INTERNAL MEDICINE

## 2021-02-07 PROCEDURE — 63710000001 INSULIN REGULAR HUMAN PER 5 UNITS: Performed by: SURGERY

## 2021-02-07 RX ADMIN — OXYCODONE HYDROCHLORIDE 5 MG: 5 SOLUTION ORAL at 06:31

## 2021-02-07 RX ADMIN — Medication 1 TABLET: at 08:50

## 2021-02-07 RX ADMIN — HYDROCORTISONE: 1 CREAM TOPICAL at 20:32

## 2021-02-07 RX ADMIN — LANSOPRAZOLE 30 MG: KIT at 06:30

## 2021-02-07 RX ADMIN — SODIUM CHLORIDE, PRESERVATIVE FREE 10 ML: 5 INJECTION INTRAVENOUS at 20:31

## 2021-02-07 RX ADMIN — IPRATROPIUM BROMIDE AND ALBUTEROL SULFATE 3 ML: 2.5; .5 SOLUTION RESPIRATORY (INHALATION) at 07:43

## 2021-02-07 RX ADMIN — SODIUM CHLORIDE, PRESERVATIVE FREE 10 ML: 5 INJECTION INTRAVENOUS at 08:50

## 2021-02-07 RX ADMIN — MIDODRINE HYDROCHLORIDE 5 MG: 5 TABLET ORAL at 22:00

## 2021-02-07 RX ADMIN — MAGNESIUM SULFATE HEPTAHYDRATE 2 G: 2 INJECTION, SOLUTION INTRAVENOUS at 08:49

## 2021-02-07 RX ADMIN — IPRATROPIUM BROMIDE AND ALBUTEROL SULFATE 3 ML: 2.5; .5 SOLUTION RESPIRATORY (INHALATION) at 01:05

## 2021-02-07 RX ADMIN — INSULIN HUMAN 3 UNITS: 100 INJECTION, SOLUTION PARENTERAL at 06:30

## 2021-02-07 RX ADMIN — MIDODRINE HYDROCHLORIDE 5 MG: 5 TABLET ORAL at 13:59

## 2021-02-07 RX ADMIN — ACETAMINOPHEN ORAL SOLUTION 500 MG: 650 SOLUTION ORAL at 06:31

## 2021-02-07 RX ADMIN — SERTRALINE HYDROCHLORIDE 25 MG: 25 TABLET ORAL at 08:49

## 2021-02-07 RX ADMIN — ATORVASTATIN CALCIUM 80 MG: 40 TABLET, FILM COATED ORAL at 20:32

## 2021-02-07 RX ADMIN — HYDROCORTISONE: 1 CREAM TOPICAL at 08:47

## 2021-02-07 RX ADMIN — CHLORHEXIDINE GLUCONATE 0.12% ORAL RINSE 15 ML: 1.2 LIQUID ORAL at 20:31

## 2021-02-07 RX ADMIN — OXYCODONE HYDROCHLORIDE 5 MG: 5 SOLUTION ORAL at 22:00

## 2021-02-07 RX ADMIN — OXYCODONE HYDROCHLORIDE 5 MG: 5 SOLUTION ORAL at 16:05

## 2021-02-07 RX ADMIN — TAZOBACTAM SODIUM AND PIPERACILLIN SODIUM 4.5 G: 500; 4 INJECTION, SOLUTION INTRAVENOUS at 12:02

## 2021-02-07 RX ADMIN — IPRATROPIUM BROMIDE AND ALBUTEROL SULFATE 3 ML: 2.5; .5 SOLUTION RESPIRATORY (INHALATION) at 19:32

## 2021-02-07 RX ADMIN — HEPARIN SODIUM 5000 UNITS: 5000 INJECTION, SOLUTION INTRAVENOUS; SUBCUTANEOUS at 13:59

## 2021-02-07 RX ADMIN — ASPIRIN 81 MG CHEWABLE TABLET 81 MG: 81 TABLET CHEWABLE at 08:50

## 2021-02-07 RX ADMIN — Medication 30 ML: at 20:32

## 2021-02-07 RX ADMIN — HEPARIN SODIUM 5000 UNITS: 5000 INJECTION, SOLUTION INTRAVENOUS; SUBCUTANEOUS at 22:00

## 2021-02-07 RX ADMIN — HEPARIN SODIUM 5000 UNITS: 5000 INJECTION, SOLUTION INTRAVENOUS; SUBCUTANEOUS at 07:00

## 2021-02-07 RX ADMIN — TERAZOSIN HYDROCHLORIDE 1 MG: 1 CAPSULE ORAL at 20:31

## 2021-02-07 RX ADMIN — CHLORHEXIDINE GLUCONATE 0.12% ORAL RINSE 15 ML: 1.2 LIQUID ORAL at 08:47

## 2021-02-07 RX ADMIN — MIDODRINE HYDROCHLORIDE 5 MG: 5 TABLET ORAL at 07:00

## 2021-02-07 RX ADMIN — INSULIN HUMAN 3 UNITS: 100 INJECTION, SOLUTION PARENTERAL at 12:02

## 2021-02-07 RX ADMIN — INSULIN DETEMIR 15 UNITS: 100 INJECTION, SOLUTION SUBCUTANEOUS at 08:49

## 2021-02-07 RX ADMIN — CARVEDILOL 25 MG: 12.5 TABLET, FILM COATED ORAL at 08:50

## 2021-02-07 RX ADMIN — OXYCODONE HYDROCHLORIDE 5 MG: 5 SOLUTION ORAL at 00:26

## 2021-02-07 RX ADMIN — Medication 30 ML: at 08:54

## 2021-02-07 RX ADMIN — IPRATROPIUM BROMIDE AND ALBUTEROL SULFATE 3 ML: 2.5; .5 SOLUTION RESPIRATORY (INHALATION) at 23:53

## 2021-02-07 RX ADMIN — IPRATROPIUM BROMIDE AND ALBUTEROL SULFATE 3 ML: 2.5; .5 SOLUTION RESPIRATORY (INHALATION) at 13:59

## 2021-02-07 RX ADMIN — CARVEDILOL 25 MG: 12.5 TABLET, FILM COATED ORAL at 17:52

## 2021-02-07 RX ADMIN — TAZOBACTAM SODIUM AND PIPERACILLIN SODIUM 3.38 G: 375; 3 INJECTION, SOLUTION INTRAVENOUS at 20:32

## 2021-02-08 ENCOUNTER — APPOINTMENT (OUTPATIENT)
Dept: NEPHROLOGY | Facility: HOSPITAL | Age: 71
End: 2021-02-08

## 2021-02-08 PROBLEM — D63.8 ANEMIA, CHRONIC DISEASE: Status: ACTIVE | Noted: 2021-02-08

## 2021-02-08 LAB
ANION GAP SERPL CALCULATED.3IONS-SCNC: 13 MMOL/L (ref 5–15)
ARTERIAL PATENCY WRIST A: ABNORMAL
ATMOSPHERIC PRESS: ABNORMAL MM[HG]
BASE EXCESS BLDA CALC-SCNC: 2.6 MMOL/L (ref 0–2)
BASOPHILS # BLD AUTO: 0.04 10*3/MM3 (ref 0–0.2)
BASOPHILS NFR BLD AUTO: 0.4 % (ref 0–1.5)
BDY SITE: ABNORMAL
BODY TEMPERATURE: 37 C
BUN SERPL-MCNC: 72 MG/DL (ref 8–23)
BUN/CREAT SERPL: 18.1 (ref 7–25)
CALCIUM SPEC-SCNC: 9.1 MG/DL (ref 8.6–10.5)
CHLORIDE SERPL-SCNC: 98 MMOL/L (ref 98–107)
CO2 BLDA-SCNC: 28.8 MMOL/L (ref 22–33)
CO2 SERPL-SCNC: 23 MMOL/L (ref 22–29)
COHGB MFR BLD: 1.5 % (ref 0–2)
CREAT SERPL-MCNC: 3.97 MG/DL (ref 0.57–1)
DEPRECATED RDW RBC AUTO: 63.7 FL (ref 37–54)
EOSINOPHIL # BLD AUTO: 0.33 10*3/MM3 (ref 0–0.4)
EOSINOPHIL NFR BLD AUTO: 3.6 % (ref 0.3–6.2)
EPAP: 0
ERYTHROCYTE [DISTWIDTH] IN BLOOD BY AUTOMATED COUNT: 17.2 % (ref 12.3–15.4)
GFR SERPL CREATININE-BSD FRML MDRD: 14 ML/MIN/1.73
GFR SERPL CREATININE-BSD FRML MDRD: ABNORMAL ML/MIN/{1.73_M2}
GLUCOSE BLDC GLUCOMTR-MCNC: 145 MG/DL (ref 70–130)
GLUCOSE BLDC GLUCOMTR-MCNC: 162 MG/DL (ref 70–130)
GLUCOSE BLDC GLUCOMTR-MCNC: 165 MG/DL (ref 70–130)
GLUCOSE BLDC GLUCOMTR-MCNC: 178 MG/DL (ref 70–130)
GLUCOSE SERPL-MCNC: 154 MG/DL (ref 65–99)
HCO3 BLDA-SCNC: 27.5 MMOL/L (ref 20–26)
HCT VFR BLD AUTO: 25.4 % (ref 34–46.6)
HCT VFR BLD CALC: 22.5 %
HGB BLD-MCNC: 7.3 G/DL (ref 12–15.9)
HGB BLDA-MCNC: 7.3 G/DL (ref 14–18)
IMM GRANULOCYTES # BLD AUTO: 0.22 10*3/MM3 (ref 0–0.05)
IMM GRANULOCYTES NFR BLD AUTO: 2.4 % (ref 0–0.5)
INHALED O2 CONCENTRATION: 30 %
IPAP: 0
LYMPHOCYTES # BLD AUTO: 1.11 10*3/MM3 (ref 0.7–3.1)
LYMPHOCYTES NFR BLD AUTO: 12.1 % (ref 19.6–45.3)
MAGNESIUM SERPL-MCNC: 2.4 MG/DL (ref 1.6–2.4)
MCH RBC QN AUTO: 29.8 PG (ref 26.6–33)
MCHC RBC AUTO-ENTMCNC: 28.7 G/DL (ref 31.5–35.7)
MCV RBC AUTO: 103.7 FL (ref 79–97)
METHGB BLD QL: 1 % (ref 0–1.5)
MODALITY: ABNORMAL
MONOCYTES # BLD AUTO: 0.66 10*3/MM3 (ref 0.1–0.9)
MONOCYTES NFR BLD AUTO: 7.2 % (ref 5–12)
NEUTROPHILS NFR BLD AUTO: 6.8 10*3/MM3 (ref 1.7–7)
NEUTROPHILS NFR BLD AUTO: 74.3 % (ref 42.7–76)
NOTE: ABNORMAL
NRBC BLD AUTO-RTO: 0.2 /100 WBC (ref 0–0.2)
OXYHGB MFR BLDV: 95.9 % (ref 94–99)
PAW @ PEAK INSP FLOW SETTING VENT: 0 CMH2O
PCO2 BLDA: 43.2 MM HG (ref 35–45)
PCO2 TEMP ADJ BLD: 43.2 MM HG (ref 35–45)
PEEP RESPIRATORY: 6 CM[H2O]
PH BLDA: 7.41 PH UNITS (ref 7.35–7.45)
PH, TEMP CORRECTED: 7.41 PH UNITS
PHOSPHATE SERPL-MCNC: 2.9 MG/DL (ref 2.5–4.5)
PLATELET # BLD AUTO: 355 10*3/MM3 (ref 140–450)
PMV BLD AUTO: 10.2 FL (ref 6–12)
PO2 BLDA: 95 MM HG (ref 83–108)
PO2 TEMP ADJ BLD: 95 MM HG (ref 83–108)
POTASSIUM SERPL-SCNC: 4.1 MMOL/L (ref 3.5–5.2)
RBC # BLD AUTO: 2.45 10*6/MM3 (ref 3.77–5.28)
SODIUM SERPL-SCNC: 134 MMOL/L (ref 136–145)
TOTAL RATE: 0 BREATHS/MINUTE
VENTILATOR MODE: ABNORMAL
WBC # BLD AUTO: 9.16 10*3/MM3 (ref 3.4–10.8)

## 2021-02-08 PROCEDURE — 83735 ASSAY OF MAGNESIUM: CPT | Performed by: SURGERY

## 2021-02-08 PROCEDURE — 36600 WITHDRAWAL OF ARTERIAL BLOOD: CPT

## 2021-02-08 PROCEDURE — 82375 ASSAY CARBOXYHB QUANT: CPT

## 2021-02-08 PROCEDURE — 94799 UNLISTED PULMONARY SVC/PX: CPT

## 2021-02-08 PROCEDURE — 97110 THERAPEUTIC EXERCISES: CPT

## 2021-02-08 PROCEDURE — 85025 COMPLETE CBC W/AUTO DIFF WBC: CPT | Performed by: INTERNAL MEDICINE

## 2021-02-08 PROCEDURE — 83050 HGB METHEMOGLOBIN QUAN: CPT

## 2021-02-08 PROCEDURE — 80048 BASIC METABOLIC PNL TOTAL CA: CPT | Performed by: SURGERY

## 2021-02-08 PROCEDURE — 94003 VENT MGMT INPAT SUBQ DAY: CPT

## 2021-02-08 PROCEDURE — 82962 GLUCOSE BLOOD TEST: CPT

## 2021-02-08 PROCEDURE — 99233 SBSQ HOSP IP/OBS HIGH 50: CPT | Performed by: INTERNAL MEDICINE

## 2021-02-08 PROCEDURE — 82805 BLOOD GASES W/O2 SATURATION: CPT

## 2021-02-08 PROCEDURE — 63710000001 INSULIN REGULAR HUMAN PER 5 UNITS: Performed by: SURGERY

## 2021-02-08 PROCEDURE — 25010000002 PIPERACILLIN SOD-TAZOBACTAM PER 1 G: Performed by: INTERNAL MEDICINE

## 2021-02-08 PROCEDURE — P9047 ALBUMIN (HUMAN), 25%, 50ML: HCPCS | Performed by: INTERNAL MEDICINE

## 2021-02-08 PROCEDURE — 25010000002 EPOETIN ALFA-EPBX 10000 UNIT/ML SOLUTION: Performed by: SURGERY

## 2021-02-08 PROCEDURE — 63710000001 INSULIN DETEMIR PER 5 UNITS: Performed by: SURGERY

## 2021-02-08 PROCEDURE — 25010000002 ALBUMIN HUMAN 25% PER 50 ML: Performed by: INTERNAL MEDICINE

## 2021-02-08 PROCEDURE — 25010000002 HEPARIN (PORCINE) PER 1000 UNITS: Performed by: INTERNAL MEDICINE

## 2021-02-08 PROCEDURE — 84100 ASSAY OF PHOSPHORUS: CPT | Performed by: SURGERY

## 2021-02-08 RX ORDER — MIDODRINE HYDROCHLORIDE 5 MG/1
2.5 TABLET ORAL EVERY 8 HOURS
Status: DISCONTINUED | OUTPATIENT
Start: 2021-02-08 | End: 2021-02-09

## 2021-02-08 RX ORDER — ALBUMIN (HUMAN) 12.5 G/50ML
12.5 SOLUTION INTRAVENOUS AS NEEDED
Status: DISPENSED | OUTPATIENT
Start: 2021-02-08 | End: 2021-02-08

## 2021-02-08 RX ADMIN — DOCUSATE SODIUM 100 MG: 50 LIQUID ORAL at 20:39

## 2021-02-08 RX ADMIN — INSULIN HUMAN 5 UNITS: 100 INJECTION, SOLUTION PARENTERAL at 18:17

## 2021-02-08 RX ADMIN — IPRATROPIUM BROMIDE AND ALBUTEROL SULFATE 3 ML: 2.5; .5 SOLUTION RESPIRATORY (INHALATION) at 19:14

## 2021-02-08 RX ADMIN — SERTRALINE HYDROCHLORIDE 25 MG: 25 TABLET ORAL at 13:01

## 2021-02-08 RX ADMIN — IPRATROPIUM BROMIDE AND ALBUTEROL SULFATE 3 ML: 2.5; .5 SOLUTION RESPIRATORY (INHALATION) at 07:01

## 2021-02-08 RX ADMIN — INSULIN DETEMIR 15 UNITS: 100 INJECTION, SOLUTION SUBCUTANEOUS at 13:21

## 2021-02-08 RX ADMIN — INSULIN HUMAN 3 UNITS: 100 INJECTION, SOLUTION PARENTERAL at 00:26

## 2021-02-08 RX ADMIN — LANSOPRAZOLE 30 MG: KIT at 05:56

## 2021-02-08 RX ADMIN — SENNOSIDES 10 ML: 8.8 LIQUID ORAL at 12:59

## 2021-02-08 RX ADMIN — ALBUMIN HUMAN 12.5 G: 0.25 SOLUTION INTRAVENOUS at 09:34

## 2021-02-08 RX ADMIN — ALBUMIN HUMAN 12.5 G: 0.25 SOLUTION INTRAVENOUS at 09:35

## 2021-02-08 RX ADMIN — MIDODRINE HYDROCHLORIDE 5 MG: 5 TABLET ORAL at 05:56

## 2021-02-08 RX ADMIN — IPRATROPIUM BROMIDE AND ALBUTEROL SULFATE 3 ML: 2.5; .5 SOLUTION RESPIRATORY (INHALATION) at 13:33

## 2021-02-08 RX ADMIN — DOCUSATE SODIUM 100 MG: 50 LIQUID ORAL at 13:00

## 2021-02-08 RX ADMIN — TAZOBACTAM SODIUM AND PIPERACILLIN SODIUM 3.38 G: 375; 3 INJECTION, SOLUTION INTRAVENOUS at 13:00

## 2021-02-08 RX ADMIN — MIDODRINE HYDROCHLORIDE 2.5 MG: 5 TABLET ORAL at 22:27

## 2021-02-08 RX ADMIN — ATORVASTATIN CALCIUM 80 MG: 40 TABLET, FILM COATED ORAL at 20:40

## 2021-02-08 RX ADMIN — EPOETIN ALFA-EPBX 20000 UNITS: 10000 INJECTION, SOLUTION INTRAVENOUS; SUBCUTANEOUS at 13:00

## 2021-02-08 RX ADMIN — HYDROCORTISONE: 1 CREAM TOPICAL at 12:58

## 2021-02-08 RX ADMIN — TAZOBACTAM SODIUM AND PIPERACILLIN SODIUM 3.38 G: 375; 3 INJECTION, SOLUTION INTRAVENOUS at 20:40

## 2021-02-08 RX ADMIN — CHLORHEXIDINE GLUCONATE 0.12% ORAL RINSE 15 ML: 1.2 LIQUID ORAL at 12:59

## 2021-02-08 RX ADMIN — Medication 30 ML: at 12:58

## 2021-02-08 RX ADMIN — CARVEDILOL 25 MG: 12.5 TABLET, FILM COATED ORAL at 17:38

## 2021-02-08 RX ADMIN — TERAZOSIN HYDROCHLORIDE 1 MG: 1 CAPSULE ORAL at 20:39

## 2021-02-08 RX ADMIN — Medication 1 TABLET: at 12:59

## 2021-02-08 RX ADMIN — ASPIRIN 81 MG CHEWABLE TABLET 81 MG: 81 TABLET CHEWABLE at 13:01

## 2021-02-08 RX ADMIN — HEPARIN SODIUM 5000 UNITS: 5000 INJECTION, SOLUTION INTRAVENOUS; SUBCUTANEOUS at 13:00

## 2021-02-08 RX ADMIN — HYDROCORTISONE 1 APPLICATION: 1 CREAM TOPICAL at 20:39

## 2021-02-08 RX ADMIN — Medication 1 TABLET: at 13:00

## 2021-02-08 RX ADMIN — CHLORHEXIDINE GLUCONATE 0.12% ORAL RINSE 15 ML: 1.2 LIQUID ORAL at 20:39

## 2021-02-08 RX ADMIN — MIDODRINE HYDROCHLORIDE 5 MG: 5 TABLET ORAL at 13:00

## 2021-02-08 RX ADMIN — HEPARIN SODIUM 5000 UNITS: 5000 INJECTION, SOLUTION INTRAVENOUS; SUBCUTANEOUS at 22:27

## 2021-02-08 RX ADMIN — INSULIN HUMAN 3 UNITS: 100 INJECTION, SOLUTION PARENTERAL at 05:57

## 2021-02-09 LAB
ALBUMIN SERPL-MCNC: 3.4 G/DL (ref 3.5–5.2)
ALP SERPL-CCNC: 91 U/L (ref 39–117)
ALT SERPL W P-5'-P-CCNC: <5 U/L (ref 1–33)
ANION GAP SERPL CALCULATED.3IONS-SCNC: 8 MMOL/L (ref 5–15)
AST SERPL-CCNC: 13 U/L (ref 1–32)
BILIRUB SERPL-MCNC: 0.4 MG/DL (ref 0–1.2)
BUN SERPL-MCNC: 44 MG/DL (ref 8–23)
CALCIUM SPEC-SCNC: 9.1 MG/DL (ref 8.6–10.5)
CHLORIDE SERPL-SCNC: 98 MMOL/L (ref 98–107)
CHOLEST SERPL-MCNC: 65 MG/DL (ref 0–200)
CO2 SERPL-SCNC: 30 MMOL/L (ref 22–29)
CREAT SERPL-MCNC: 3.07 MG/DL (ref 0.57–1)
CRP SERPL-MCNC: 6.49 MG/DL (ref 0–0.5)
GLUCOSE BLDC GLUCOMTR-MCNC: 157 MG/DL (ref 70–130)
GLUCOSE BLDC GLUCOMTR-MCNC: 175 MG/DL (ref 70–130)
GLUCOSE BLDC GLUCOMTR-MCNC: 187 MG/DL (ref 70–130)
GLUCOSE SERPL-MCNC: 198 MG/DL (ref 65–99)
MAGNESIUM SERPL-MCNC: 2.1 MG/DL (ref 1.6–2.4)
PHOSPHATE SERPL-MCNC: 2.2 MG/DL (ref 2.5–4.5)
POTASSIUM SERPL-SCNC: 4 MMOL/L (ref 3.5–5.2)
PREALB SERPL-MCNC: 18.1 MG/DL (ref 20–40)
PROT SERPL-MCNC: 7.1 G/DL (ref 6–8.5)
SODIUM SERPL-SCNC: 136 MMOL/L (ref 136–145)
TRIGL SERPL-MCNC: 145 MG/DL (ref 0–150)

## 2021-02-09 PROCEDURE — 97110 THERAPEUTIC EXERCISES: CPT

## 2021-02-09 PROCEDURE — 94799 UNLISTED PULMONARY SVC/PX: CPT

## 2021-02-09 PROCEDURE — 82465 ASSAY BLD/SERUM CHOLESTEROL: CPT

## 2021-02-09 PROCEDURE — 25010000002 HEPARIN (PORCINE) PER 1000 UNITS: Performed by: INTERNAL MEDICINE

## 2021-02-09 PROCEDURE — 25010000002 PIPERACILLIN SOD-TAZOBACTAM PER 1 G: Performed by: INTERNAL MEDICINE

## 2021-02-09 PROCEDURE — 84134 ASSAY OF PREALBUMIN: CPT

## 2021-02-09 PROCEDURE — 63710000001 INSULIN REGULAR HUMAN PER 5 UNITS: Performed by: SURGERY

## 2021-02-09 PROCEDURE — 82962 GLUCOSE BLOOD TEST: CPT

## 2021-02-09 PROCEDURE — 86140 C-REACTIVE PROTEIN: CPT

## 2021-02-09 PROCEDURE — 83735 ASSAY OF MAGNESIUM: CPT

## 2021-02-09 PROCEDURE — 80053 COMPREHEN METABOLIC PANEL: CPT

## 2021-02-09 PROCEDURE — 63710000001 INSULIN DETEMIR PER 5 UNITS: Performed by: SURGERY

## 2021-02-09 PROCEDURE — 99233 SBSQ HOSP IP/OBS HIGH 50: CPT | Performed by: INTERNAL MEDICINE

## 2021-02-09 PROCEDURE — 84478 ASSAY OF TRIGLYCERIDES: CPT

## 2021-02-09 PROCEDURE — 94003 VENT MGMT INPAT SUBQ DAY: CPT

## 2021-02-09 PROCEDURE — 84100 ASSAY OF PHOSPHORUS: CPT

## 2021-02-09 RX ORDER — HEPARIN SODIUM 5000 [USP'U]/ML
5000 INJECTION, SOLUTION INTRAVENOUS; SUBCUTANEOUS EVERY 8 HOURS SCHEDULED
Status: COMPLETED | OUTPATIENT
Start: 2021-02-09 | End: 2021-02-11

## 2021-02-09 RX ORDER — WARFARIN SODIUM 5 MG/1
5 TABLET ORAL
Status: DISCONTINUED | OUTPATIENT
Start: 2021-02-09 | End: 2021-02-11

## 2021-02-09 RX ADMIN — HYDROCORTISONE: 1 CREAM TOPICAL at 20:44

## 2021-02-09 RX ADMIN — ASPIRIN 81 MG CHEWABLE TABLET 81 MG: 81 TABLET CHEWABLE at 09:06

## 2021-02-09 RX ADMIN — Medication 30 ML: at 09:06

## 2021-02-09 RX ADMIN — SERTRALINE HYDROCHLORIDE 25 MG: 25 TABLET ORAL at 09:06

## 2021-02-09 RX ADMIN — TAZOBACTAM SODIUM AND PIPERACILLIN SODIUM 3.38 G: 375; 3 INJECTION, SOLUTION INTRAVENOUS at 09:06

## 2021-02-09 RX ADMIN — TERAZOSIN HYDROCHLORIDE 1 MG: 1 CAPSULE ORAL at 20:43

## 2021-02-09 RX ADMIN — INSULIN HUMAN 3 UNITS: 100 INJECTION, SOLUTION PARENTERAL at 00:28

## 2021-02-09 RX ADMIN — INSULIN HUMAN 3 UNITS: 100 INJECTION, SOLUTION PARENTERAL at 05:04

## 2021-02-09 RX ADMIN — INSULIN DETEMIR 15 UNITS: 100 INJECTION, SOLUTION SUBCUTANEOUS at 09:06

## 2021-02-09 RX ADMIN — IPRATROPIUM BROMIDE AND ALBUTEROL SULFATE 3 ML: 2.5; .5 SOLUTION RESPIRATORY (INHALATION) at 19:27

## 2021-02-09 RX ADMIN — CARVEDILOL 25 MG: 12.5 TABLET, FILM COATED ORAL at 17:56

## 2021-02-09 RX ADMIN — DOCUSATE SODIUM 100 MG: 50 LIQUID ORAL at 09:06

## 2021-02-09 RX ADMIN — MIDODRINE HYDROCHLORIDE 2.5 MG: 5 TABLET ORAL at 05:05

## 2021-02-09 RX ADMIN — IPRATROPIUM BROMIDE AND ALBUTEROL SULFATE 3 ML: 2.5; .5 SOLUTION RESPIRATORY (INHALATION) at 07:34

## 2021-02-09 RX ADMIN — WARFARIN SODIUM 5 MG: 5 TABLET ORAL at 17:56

## 2021-02-09 RX ADMIN — ATORVASTATIN CALCIUM 80 MG: 40 TABLET, FILM COATED ORAL at 20:43

## 2021-02-09 RX ADMIN — HEPARIN SODIUM 5000 UNITS: 5000 INJECTION, SOLUTION INTRAVENOUS; SUBCUTANEOUS at 05:04

## 2021-02-09 RX ADMIN — TAZOBACTAM SODIUM AND PIPERACILLIN SODIUM 3.38 G: 375; 3 INJECTION, SOLUTION INTRAVENOUS at 20:43

## 2021-02-09 RX ADMIN — CHLORHEXIDINE GLUCONATE 0.12% ORAL RINSE 15 ML: 1.2 LIQUID ORAL at 20:43

## 2021-02-09 RX ADMIN — HYDROCORTISONE: 1 CREAM TOPICAL at 09:05

## 2021-02-09 RX ADMIN — Medication 1 TABLET: at 09:06

## 2021-02-09 RX ADMIN — HEPARIN SODIUM 5000 UNITS: 5000 INJECTION INTRAVENOUS; SUBCUTANEOUS at 20:43

## 2021-02-09 RX ADMIN — INSULIN HUMAN 3 UNITS: 100 INJECTION, SOLUTION PARENTERAL at 12:44

## 2021-02-09 RX ADMIN — IPRATROPIUM BROMIDE AND ALBUTEROL SULFATE 3 ML: 2.5; .5 SOLUTION RESPIRATORY (INHALATION) at 12:29

## 2021-02-09 RX ADMIN — SODIUM CHLORIDE, PRESERVATIVE FREE 10 ML: 5 INJECTION INTRAVENOUS at 20:44

## 2021-02-09 RX ADMIN — SODIUM CHLORIDE, PRESERVATIVE FREE 10 ML: 5 INJECTION INTRAVENOUS at 09:06

## 2021-02-09 RX ADMIN — INSULIN HUMAN 3 UNITS: 100 INJECTION, SOLUTION PARENTERAL at 17:56

## 2021-02-09 RX ADMIN — CHLORHEXIDINE GLUCONATE 0.12% ORAL RINSE 15 ML: 1.2 LIQUID ORAL at 09:05

## 2021-02-09 RX ADMIN — HEPARIN SODIUM 5000 UNITS: 5000 INJECTION INTRAVENOUS; SUBCUTANEOUS at 14:09

## 2021-02-09 RX ADMIN — POTASSIUM PHOSPHATE, MONOBASIC AND POTASSIUM PHOSPHATE, DIBASIC 15 MMOL: 224; 236 INJECTION, SOLUTION, CONCENTRATE INTRAVENOUS at 15:00

## 2021-02-09 RX ADMIN — CARVEDILOL 25 MG: 12.5 TABLET, FILM COATED ORAL at 09:06

## 2021-02-09 RX ADMIN — LANSOPRAZOLE 30 MG: KIT at 05:05

## 2021-02-09 RX ADMIN — SENNOSIDES 10 ML: 8.8 LIQUID ORAL at 09:05

## 2021-02-10 ENCOUNTER — APPOINTMENT (OUTPATIENT)
Dept: NEPHROLOGY | Facility: HOSPITAL | Age: 71
End: 2021-02-10

## 2021-02-10 LAB
GLUCOSE BLDC GLUCOMTR-MCNC: 146 MG/DL (ref 70–130)
GLUCOSE BLDC GLUCOMTR-MCNC: 148 MG/DL (ref 70–130)
GLUCOSE BLDC GLUCOMTR-MCNC: 182 MG/DL (ref 70–130)
GLUCOSE BLDC GLUCOMTR-MCNC: 192 MG/DL (ref 70–130)
GLUCOSE BLDC GLUCOMTR-MCNC: 205 MG/DL (ref 70–130)
INR PPP: 1.15 (ref 0.85–1.16)
PHOSPHATE SERPL-MCNC: 3.6 MG/DL (ref 2.5–4.5)
PROTHROMBIN TIME: 14.4 SECONDS (ref 11.5–14)

## 2021-02-10 PROCEDURE — 84100 ASSAY OF PHOSPHORUS: CPT | Performed by: INTERNAL MEDICINE

## 2021-02-10 PROCEDURE — 82962 GLUCOSE BLOOD TEST: CPT

## 2021-02-10 PROCEDURE — 25010000002 HEPARIN (PORCINE) PER 1000 UNITS: Performed by: INTERNAL MEDICINE

## 2021-02-10 PROCEDURE — 85610 PROTHROMBIN TIME: CPT

## 2021-02-10 PROCEDURE — 92597 ORAL SPEECH DEVICE EVAL: CPT

## 2021-02-10 PROCEDURE — 94003 VENT MGMT INPAT SUBQ DAY: CPT

## 2021-02-10 PROCEDURE — 63710000001 INSULIN REGULAR HUMAN PER 5 UNITS: Performed by: SURGERY

## 2021-02-10 PROCEDURE — 97110 THERAPEUTIC EXERCISES: CPT

## 2021-02-10 PROCEDURE — L8501 TRACHEOSTOMY SPEAKING VALVE: HCPCS

## 2021-02-10 PROCEDURE — 25010000002 ALBUMIN HUMAN 25% PER 50 ML: Performed by: INTERNAL MEDICINE

## 2021-02-10 PROCEDURE — 94799 UNLISTED PULMONARY SVC/PX: CPT

## 2021-02-10 PROCEDURE — 25010000002 EPOETIN ALFA-EPBX 10000 UNIT/ML SOLUTION: Performed by: SURGERY

## 2021-02-10 PROCEDURE — 63710000001 INSULIN DETEMIR PER 5 UNITS: Performed by: SURGERY

## 2021-02-10 PROCEDURE — 99233 SBSQ HOSP IP/OBS HIGH 50: CPT | Performed by: INTERNAL MEDICINE

## 2021-02-10 PROCEDURE — P9047 ALBUMIN (HUMAN), 25%, 50ML: HCPCS | Performed by: INTERNAL MEDICINE

## 2021-02-10 RX ORDER — IPRATROPIUM BROMIDE AND ALBUTEROL SULFATE 2.5; .5 MG/3ML; MG/3ML
3 SOLUTION RESPIRATORY (INHALATION) EVERY 6 HOURS PRN
Status: DISCONTINUED | OUTPATIENT
Start: 2021-02-10 | End: 2021-02-17 | Stop reason: HOSPADM

## 2021-02-10 RX ORDER — ALBUMIN (HUMAN) 12.5 G/50ML
12.5 SOLUTION INTRAVENOUS AS NEEDED
Status: DISPENSED | OUTPATIENT
Start: 2021-02-10 | End: 2021-02-10

## 2021-02-10 RX ADMIN — Medication 30 ML: at 20:31

## 2021-02-10 RX ADMIN — TERAZOSIN HYDROCHLORIDE 1 MG: 1 CAPSULE ORAL at 20:31

## 2021-02-10 RX ADMIN — ACETAMINOPHEN ORAL SOLUTION 500 MG: 650 SOLUTION ORAL at 00:11

## 2021-02-10 RX ADMIN — IPRATROPIUM BROMIDE AND ALBUTEROL SULFATE 3 ML: 2.5; .5 SOLUTION RESPIRATORY (INHALATION) at 07:29

## 2021-02-10 RX ADMIN — ALBUMIN HUMAN 12.5 G: 0.25 SOLUTION INTRAVENOUS at 09:31

## 2021-02-10 RX ADMIN — ATORVASTATIN CALCIUM 80 MG: 40 TABLET, FILM COATED ORAL at 20:30

## 2021-02-10 RX ADMIN — LANSOPRAZOLE 30 MG: KIT at 05:12

## 2021-02-10 RX ADMIN — SODIUM CHLORIDE, PRESERVATIVE FREE 10 ML: 5 INJECTION INTRAVENOUS at 08:54

## 2021-02-10 RX ADMIN — CHLORHEXIDINE GLUCONATE 0.12% ORAL RINSE 15 ML: 1.2 LIQUID ORAL at 20:31

## 2021-02-10 RX ADMIN — HYDROCORTISONE 1 APPLICATION: 1 CREAM TOPICAL at 20:31

## 2021-02-10 RX ADMIN — Medication 30 ML: at 12:31

## 2021-02-10 RX ADMIN — SERTRALINE HYDROCHLORIDE 25 MG: 25 TABLET ORAL at 12:31

## 2021-02-10 RX ADMIN — INSULIN DETEMIR 15 UNITS: 100 INJECTION, SOLUTION SUBCUTANEOUS at 12:30

## 2021-02-10 RX ADMIN — IPRATROPIUM BROMIDE AND ALBUTEROL SULFATE 3 ML: 2.5; .5 SOLUTION RESPIRATORY (INHALATION) at 00:10

## 2021-02-10 RX ADMIN — HEPARIN SODIUM 5000 UNITS: 5000 INJECTION INTRAVENOUS; SUBCUTANEOUS at 22:28

## 2021-02-10 RX ADMIN — DOCUSATE SODIUM 100 MG: 50 LIQUID ORAL at 12:32

## 2021-02-10 RX ADMIN — INSULIN HUMAN 3 UNITS: 100 INJECTION, SOLUTION PARENTERAL at 18:28

## 2021-02-10 RX ADMIN — Medication 1 TABLET: at 12:31

## 2021-02-10 RX ADMIN — ALBUMIN HUMAN 12.5 G: 0.25 SOLUTION INTRAVENOUS at 08:37

## 2021-02-10 RX ADMIN — HEPARIN SODIUM 5000 UNITS: 5000 INJECTION INTRAVENOUS; SUBCUTANEOUS at 05:11

## 2021-02-10 RX ADMIN — CARVEDILOL 25 MG: 12.5 TABLET, FILM COATED ORAL at 12:32

## 2021-02-10 RX ADMIN — HYDROCORTISONE: 1 CREAM TOPICAL at 12:33

## 2021-02-10 RX ADMIN — INSULIN HUMAN 3 UNITS: 100 INJECTION, SOLUTION PARENTERAL at 06:01

## 2021-02-10 RX ADMIN — CHLORHEXIDINE GLUCONATE 0.12% ORAL RINSE 15 ML: 1.2 LIQUID ORAL at 08:54

## 2021-02-10 RX ADMIN — SENNOSIDES 10 ML: 8.8 LIQUID ORAL at 12:32

## 2021-02-10 RX ADMIN — CARVEDILOL 25 MG: 12.5 TABLET, FILM COATED ORAL at 18:28

## 2021-02-10 RX ADMIN — HEPARIN SODIUM 5000 UNITS: 5000 INJECTION INTRAVENOUS; SUBCUTANEOUS at 14:07

## 2021-02-10 RX ADMIN — ASPIRIN 81 MG CHEWABLE TABLET 81 MG: 81 TABLET CHEWABLE at 12:31

## 2021-02-10 RX ADMIN — EPOETIN ALFA-EPBX 20000 UNITS: 10000 INJECTION, SOLUTION INTRAVENOUS; SUBCUTANEOUS at 12:32

## 2021-02-10 RX ADMIN — WARFARIN SODIUM 5 MG: 5 TABLET ORAL at 18:28

## 2021-02-11 LAB
GLUCOSE BLDC GLUCOMTR-MCNC: 170 MG/DL (ref 70–130)
GLUCOSE BLDC GLUCOMTR-MCNC: 173 MG/DL (ref 70–130)
GLUCOSE BLDC GLUCOMTR-MCNC: 179 MG/DL (ref 70–130)
GLUCOSE BLDC GLUCOMTR-MCNC: 198 MG/DL (ref 70–130)
INR PPP: 1.25 (ref 0.85–1.16)
PROTHROMBIN TIME: 15.4 SECONDS (ref 11.5–14)

## 2021-02-11 PROCEDURE — 63710000001 INSULIN REGULAR HUMAN PER 5 UNITS: Performed by: SURGERY

## 2021-02-11 PROCEDURE — 92507 TX SP LANG VOICE COMM INDIV: CPT

## 2021-02-11 PROCEDURE — 82962 GLUCOSE BLOOD TEST: CPT

## 2021-02-11 PROCEDURE — 99233 SBSQ HOSP IP/OBS HIGH 50: CPT | Performed by: INTERNAL MEDICINE

## 2021-02-11 PROCEDURE — 25010000002 HEPARIN (PORCINE) PER 1000 UNITS: Performed by: INTERNAL MEDICINE

## 2021-02-11 PROCEDURE — 92610 EVALUATE SWALLOWING FUNCTION: CPT

## 2021-02-11 PROCEDURE — 94799 UNLISTED PULMONARY SVC/PX: CPT

## 2021-02-11 PROCEDURE — 85610 PROTHROMBIN TIME: CPT

## 2021-02-11 PROCEDURE — 63710000001 INSULIN DETEMIR PER 5 UNITS: Performed by: SURGERY

## 2021-02-11 PROCEDURE — 94003 VENT MGMT INPAT SUBQ DAY: CPT

## 2021-02-11 RX ORDER — LOPERAMIDE HCL 1 MG/7.5ML
2 SOLUTION ORAL ONCE
Status: COMPLETED | OUTPATIENT
Start: 2021-02-11 | End: 2021-02-11

## 2021-02-11 RX ORDER — WARFARIN SODIUM 5 MG/1
5 TABLET ORAL
Status: DISCONTINUED | OUTPATIENT
Start: 2021-02-11 | End: 2021-02-13

## 2021-02-11 RX ADMIN — CHLORHEXIDINE GLUCONATE 0.12% ORAL RINSE 15 ML: 1.2 LIQUID ORAL at 20:52

## 2021-02-11 RX ADMIN — INSULIN HUMAN 3 UNITS: 100 INJECTION, SOLUTION PARENTERAL at 12:30

## 2021-02-11 RX ADMIN — CARVEDILOL 25 MG: 12.5 TABLET, FILM COATED ORAL at 08:09

## 2021-02-11 RX ADMIN — SODIUM CHLORIDE, PRESERVATIVE FREE 10 ML: 5 INJECTION INTRAVENOUS at 08:09

## 2021-02-11 RX ADMIN — HYDROCORTISONE 1 APPLICATION: 1 CREAM TOPICAL at 20:53

## 2021-02-11 RX ADMIN — INSULIN HUMAN 3 UNITS: 100 INJECTION, SOLUTION PARENTERAL at 18:33

## 2021-02-11 RX ADMIN — ATORVASTATIN CALCIUM 80 MG: 40 TABLET, FILM COATED ORAL at 20:52

## 2021-02-11 RX ADMIN — TERAZOSIN HYDROCHLORIDE 1 MG: 1 CAPSULE ORAL at 20:52

## 2021-02-11 RX ADMIN — HYDROCORTISONE: 1 CREAM TOPICAL at 08:09

## 2021-02-11 RX ADMIN — LOPERAMIDE HYDROCHLORIDE 2 MG: 1 SOLUTION ORAL at 12:30

## 2021-02-11 RX ADMIN — Medication 30 ML: at 08:09

## 2021-02-11 RX ADMIN — INSULIN HUMAN 3 UNITS: 100 INJECTION, SOLUTION PARENTERAL at 06:14

## 2021-02-11 RX ADMIN — ACETAMINOPHEN ORAL SOLUTION 500 MG: 650 SOLUTION ORAL at 12:29

## 2021-02-11 RX ADMIN — INSULIN HUMAN 5 UNITS: 100 INJECTION, SOLUTION PARENTERAL at 00:38

## 2021-02-11 RX ADMIN — CHLORHEXIDINE GLUCONATE 0.12% ORAL RINSE 15 ML: 1.2 LIQUID ORAL at 08:09

## 2021-02-11 RX ADMIN — WARFARIN SODIUM 5 MG: 5 TABLET ORAL at 18:33

## 2021-02-11 RX ADMIN — SERTRALINE HYDROCHLORIDE 25 MG: 25 TABLET ORAL at 08:08

## 2021-02-11 RX ADMIN — INSULIN DETEMIR 15 UNITS: 100 INJECTION, SOLUTION SUBCUTANEOUS at 08:09

## 2021-02-11 RX ADMIN — HEPARIN SODIUM 5000 UNITS: 5000 INJECTION INTRAVENOUS; SUBCUTANEOUS at 06:14

## 2021-02-11 RX ADMIN — Medication 30 ML: at 21:01

## 2021-02-11 RX ADMIN — ASPIRIN 81 MG CHEWABLE TABLET 81 MG: 81 TABLET CHEWABLE at 08:08

## 2021-02-11 RX ADMIN — Medication 1 TABLET: at 08:08

## 2021-02-11 RX ADMIN — LANSOPRAZOLE 30 MG: KIT at 06:14

## 2021-02-11 RX ADMIN — CARVEDILOL 25 MG: 12.5 TABLET, FILM COATED ORAL at 18:33

## 2021-02-12 ENCOUNTER — APPOINTMENT (OUTPATIENT)
Dept: NEPHROLOGY | Facility: HOSPITAL | Age: 71
End: 2021-02-12

## 2021-02-12 LAB
ALBUMIN SERPL-MCNC: 3.9 G/DL (ref 3.5–5.2)
ALBUMIN/GLOB SERPL: 1.1 G/DL
ALP SERPL-CCNC: 92 U/L (ref 39–117)
ALT SERPL W P-5'-P-CCNC: 5 U/L (ref 1–33)
ANION GAP SERPL CALCULATED.3IONS-SCNC: 10 MMOL/L (ref 5–15)
AST SERPL-CCNC: 14 U/L (ref 1–32)
BILIRUB SERPL-MCNC: 0.5 MG/DL (ref 0–1.2)
BUN SERPL-MCNC: 24 MG/DL (ref 8–23)
BUN/CREAT SERPL: 11.7 (ref 7–25)
CALCIUM SPEC-SCNC: 9 MG/DL (ref 8.6–10.5)
CHLORIDE SERPL-SCNC: 98 MMOL/L (ref 98–107)
CO2 SERPL-SCNC: 27 MMOL/L (ref 22–29)
CREAT SERPL-MCNC: 2.05 MG/DL (ref 0.57–1)
DEPRECATED RDW RBC AUTO: 67.9 FL (ref 37–54)
ERYTHROCYTE [DISTWIDTH] IN BLOOD BY AUTOMATED COUNT: 17.6 % (ref 12.3–15.4)
GFR SERPL CREATININE-BSD FRML MDRD: 29 ML/MIN/1.73
GLOBULIN UR ELPH-MCNC: 3.5 GM/DL
GLUCOSE BLDC GLUCOMTR-MCNC: 153 MG/DL (ref 70–130)
GLUCOSE BLDC GLUCOMTR-MCNC: 172 MG/DL (ref 70–130)
GLUCOSE BLDC GLUCOMTR-MCNC: 199 MG/DL (ref 70–130)
GLUCOSE BLDC GLUCOMTR-MCNC: 221 MG/DL (ref 70–130)
GLUCOSE SERPL-MCNC: 170 MG/DL (ref 65–99)
HCT VFR BLD AUTO: 27.7 % (ref 34–46.6)
HGB BLD-MCNC: 7.8 G/DL (ref 12–15.9)
INR PPP: 1.2 (ref 0.85–1.16)
MAGNESIUM SERPL-MCNC: 1.8 MG/DL (ref 1.6–2.4)
MCH RBC QN AUTO: 30.1 PG (ref 26.6–33)
MCHC RBC AUTO-ENTMCNC: 28.2 G/DL (ref 31.5–35.7)
MCV RBC AUTO: 106.9 FL (ref 79–97)
PHOSPHATE SERPL-MCNC: 1.9 MG/DL (ref 2.5–4.5)
PLATELET # BLD AUTO: 332 10*3/MM3 (ref 140–450)
PMV BLD AUTO: 10.1 FL (ref 6–12)
POTASSIUM SERPL-SCNC: 3.6 MMOL/L (ref 3.5–5.2)
PROT SERPL-MCNC: 7.4 G/DL (ref 6–8.5)
PROTHROMBIN TIME: 14.9 SECONDS (ref 11.5–14)
RBC # BLD AUTO: 2.59 10*6/MM3 (ref 3.77–5.28)
SODIUM SERPL-SCNC: 135 MMOL/L (ref 136–145)
WBC # BLD AUTO: 12 10*3/MM3 (ref 3.4–10.8)

## 2021-02-12 PROCEDURE — 94799 UNLISTED PULMONARY SVC/PX: CPT

## 2021-02-12 PROCEDURE — 97530 THERAPEUTIC ACTIVITIES: CPT

## 2021-02-12 PROCEDURE — 85027 COMPLETE CBC AUTOMATED: CPT | Performed by: INTERNAL MEDICINE

## 2021-02-12 PROCEDURE — 83735 ASSAY OF MAGNESIUM: CPT | Performed by: INTERNAL MEDICINE

## 2021-02-12 PROCEDURE — P9047 ALBUMIN (HUMAN), 25%, 50ML: HCPCS | Performed by: INTERNAL MEDICINE

## 2021-02-12 PROCEDURE — 82962 GLUCOSE BLOOD TEST: CPT

## 2021-02-12 PROCEDURE — 84100 ASSAY OF PHOSPHORUS: CPT | Performed by: INTERNAL MEDICINE

## 2021-02-12 PROCEDURE — 94003 VENT MGMT INPAT SUBQ DAY: CPT

## 2021-02-12 PROCEDURE — 63710000001 INSULIN REGULAR HUMAN PER 5 UNITS: Performed by: SURGERY

## 2021-02-12 PROCEDURE — 97110 THERAPEUTIC EXERCISES: CPT

## 2021-02-12 PROCEDURE — 99233 SBSQ HOSP IP/OBS HIGH 50: CPT | Performed by: INTERNAL MEDICINE

## 2021-02-12 PROCEDURE — 85610 PROTHROMBIN TIME: CPT | Performed by: INTERNAL MEDICINE

## 2021-02-12 PROCEDURE — 63710000001 INSULIN DETEMIR PER 5 UNITS: Performed by: SURGERY

## 2021-02-12 PROCEDURE — 25010000002 ALBUMIN HUMAN 25% PER 50 ML: Performed by: INTERNAL MEDICINE

## 2021-02-12 PROCEDURE — 80053 COMPREHEN METABOLIC PANEL: CPT | Performed by: INTERNAL MEDICINE

## 2021-02-12 PROCEDURE — 25010000002 MAGNESIUM SULFATE 2 GM/50ML SOLUTION: Performed by: SURGERY

## 2021-02-12 RX ORDER — ALBUMIN (HUMAN) 12.5 G/50ML
12.5 SOLUTION INTRAVENOUS AS NEEDED
Status: DISPENSED | OUTPATIENT
Start: 2021-02-12 | End: 2021-02-12

## 2021-02-12 RX ADMIN — WARFARIN SODIUM 5 MG: 5 TABLET ORAL at 17:05

## 2021-02-12 RX ADMIN — INSULIN HUMAN 3 UNITS: 100 INJECTION, SOLUTION PARENTERAL at 06:29

## 2021-02-12 RX ADMIN — TERAZOSIN HYDROCHLORIDE 1 MG: 1 CAPSULE ORAL at 20:45

## 2021-02-12 RX ADMIN — Medication 30 ML: at 20:45

## 2021-02-12 RX ADMIN — ASPIRIN 81 MG CHEWABLE TABLET 81 MG: 81 TABLET CHEWABLE at 08:21

## 2021-02-12 RX ADMIN — LANSOPRAZOLE 30 MG: KIT at 06:29

## 2021-02-12 RX ADMIN — OXYCODONE HYDROCHLORIDE 5 MG: 5 SOLUTION ORAL at 22:01

## 2021-02-12 RX ADMIN — CARVEDILOL 25 MG: 12.5 TABLET, FILM COATED ORAL at 08:21

## 2021-02-12 RX ADMIN — INSULIN DETEMIR 15 UNITS: 100 INJECTION, SOLUTION SUBCUTANEOUS at 17:04

## 2021-02-12 RX ADMIN — ALBUMIN HUMAN 25 G: 0.25 SOLUTION INTRAVENOUS at 10:30

## 2021-02-12 RX ADMIN — ALBUMIN HUMAN 25 G: 0.25 SOLUTION INTRAVENOUS at 11:48

## 2021-02-12 RX ADMIN — INSULIN HUMAN 3 UNITS: 100 INJECTION, SOLUTION PARENTERAL at 00:42

## 2021-02-12 RX ADMIN — OXYCODONE HYDROCHLORIDE 5 MG: 5 SOLUTION ORAL at 10:58

## 2021-02-12 RX ADMIN — CHLORHEXIDINE GLUCONATE 0.12% ORAL RINSE 15 ML: 1.2 LIQUID ORAL at 20:45

## 2021-02-12 RX ADMIN — SODIUM PHOSPHATE, MONOBASIC, MONOHYDRATE AND SODIUM PHOSPHATE, DIBASIC, ANHYDROUS 15 MMOL: 276; 142 INJECTION, SOLUTION INTRAVENOUS at 20:45

## 2021-02-12 RX ADMIN — HYDROCORTISONE: 1 CREAM TOPICAL at 08:23

## 2021-02-12 RX ADMIN — CARVEDILOL 25 MG: 12.5 TABLET, FILM COATED ORAL at 17:05

## 2021-02-12 RX ADMIN — CHLORHEXIDINE GLUCONATE 0.12% ORAL RINSE 15 ML: 1.2 LIQUID ORAL at 08:21

## 2021-02-12 RX ADMIN — MAGNESIUM SULFATE HEPTAHYDRATE 2 G: 2 INJECTION, SOLUTION INTRAVENOUS at 18:09

## 2021-02-12 RX ADMIN — Medication 1 TABLET: at 08:21

## 2021-02-12 RX ADMIN — Medication 30 ML: at 08:21

## 2021-02-12 RX ADMIN — SODIUM CHLORIDE, PRESERVATIVE FREE 10 ML: 5 INJECTION INTRAVENOUS at 08:21

## 2021-02-12 RX ADMIN — ATORVASTATIN CALCIUM 80 MG: 40 TABLET, FILM COATED ORAL at 20:45

## 2021-02-12 RX ADMIN — HYDROCORTISONE 1 APPLICATION: 1 CREAM TOPICAL at 20:45

## 2021-02-12 RX ADMIN — SERTRALINE HYDROCHLORIDE 25 MG: 25 TABLET ORAL at 08:21

## 2021-02-12 RX ADMIN — INSULIN HUMAN 5 UNITS: 100 INJECTION, SOLUTION PARENTERAL at 17:42

## 2021-02-13 ENCOUNTER — APPOINTMENT (OUTPATIENT)
Dept: PULMONOLOGY | Facility: HOSPITAL | Age: 71
End: 2021-02-13

## 2021-02-13 LAB
ALBUMIN SERPL-MCNC: 3.9 G/DL (ref 3.5–5.2)
ANION GAP SERPL CALCULATED.3IONS-SCNC: 8 MMOL/L (ref 5–15)
BUN SERPL-MCNC: 42 MG/DL (ref 8–23)
BUN/CREAT SERPL: 13.3 (ref 7–25)
CALCIUM SPEC-SCNC: 9.5 MG/DL (ref 8.6–10.5)
CHLORIDE SERPL-SCNC: 96 MMOL/L (ref 98–107)
CO2 SERPL-SCNC: 30 MMOL/L (ref 22–29)
CREAT SERPL-MCNC: 3.16 MG/DL (ref 0.57–1)
GFR SERPL CREATININE-BSD FRML MDRD: 18 ML/MIN/1.73
GLUCOSE BLDC GLUCOMTR-MCNC: 170 MG/DL (ref 70–130)
GLUCOSE BLDC GLUCOMTR-MCNC: 171 MG/DL (ref 70–130)
GLUCOSE BLDC GLUCOMTR-MCNC: 202 MG/DL (ref 70–130)
GLUCOSE BLDC GLUCOMTR-MCNC: 206 MG/DL (ref 70–130)
GLUCOSE SERPL-MCNC: 183 MG/DL (ref 65–99)
INR PPP: 1.25 (ref 0.85–1.16)
PHOSPHATE SERPL-MCNC: 3.3 MG/DL (ref 2.5–4.5)
POTASSIUM SERPL-SCNC: 3.6 MMOL/L (ref 3.5–5.2)
PROTHROMBIN TIME: 15.4 SECONDS (ref 11.5–14)
SODIUM SERPL-SCNC: 134 MMOL/L (ref 136–145)

## 2021-02-13 PROCEDURE — 94003 VENT MGMT INPAT SUBQ DAY: CPT

## 2021-02-13 PROCEDURE — 99233 SBSQ HOSP IP/OBS HIGH 50: CPT | Performed by: INTERNAL MEDICINE

## 2021-02-13 PROCEDURE — 94690 O2 UPTK REST INDIRECT: CPT | Performed by: INTERNAL MEDICINE

## 2021-02-13 PROCEDURE — 63710000001 INSULIN REGULAR HUMAN PER 5 UNITS: Performed by: SURGERY

## 2021-02-13 PROCEDURE — 85610 PROTHROMBIN TIME: CPT

## 2021-02-13 PROCEDURE — 63710000001 INSULIN DETEMIR PER 5 UNITS: Performed by: SURGERY

## 2021-02-13 PROCEDURE — 94690 O2 UPTK REST INDIRECT: CPT

## 2021-02-13 PROCEDURE — 94799 UNLISTED PULMONARY SVC/PX: CPT

## 2021-02-13 PROCEDURE — 80069 RENAL FUNCTION PANEL: CPT | Performed by: INTERNAL MEDICINE

## 2021-02-13 PROCEDURE — 82962 GLUCOSE BLOOD TEST: CPT

## 2021-02-13 RX ORDER — WARFARIN SODIUM 7.5 MG/1
7.5 TABLET ORAL
Status: DISCONTINUED | OUTPATIENT
Start: 2021-02-13 | End: 2021-02-17 | Stop reason: HOSPADM

## 2021-02-13 RX ADMIN — SODIUM CHLORIDE, PRESERVATIVE FREE 10 ML: 5 INJECTION INTRAVENOUS at 20:32

## 2021-02-13 RX ADMIN — CHLORHEXIDINE GLUCONATE 0.12% ORAL RINSE 15 ML: 1.2 LIQUID ORAL at 20:30

## 2021-02-13 RX ADMIN — TERAZOSIN HYDROCHLORIDE 1 MG: 1 CAPSULE ORAL at 20:30

## 2021-02-13 RX ADMIN — INSULIN HUMAN 5 UNITS: 100 INJECTION, SOLUTION PARENTERAL at 17:59

## 2021-02-13 RX ADMIN — WARFARIN SODIUM 7.5 MG: 7.5 TABLET ORAL at 17:59

## 2021-02-13 RX ADMIN — Medication 30 ML: at 08:44

## 2021-02-13 RX ADMIN — SERTRALINE HYDROCHLORIDE 25 MG: 25 TABLET ORAL at 08:44

## 2021-02-13 RX ADMIN — Medication 30 ML: at 20:31

## 2021-02-13 RX ADMIN — ATORVASTATIN CALCIUM 80 MG: 40 TABLET, FILM COATED ORAL at 20:30

## 2021-02-13 RX ADMIN — ACETAMINOPHEN ORAL SOLUTION 500 MG: 650 SOLUTION ORAL at 22:57

## 2021-02-13 RX ADMIN — HYDROCORTISONE: 1 CREAM TOPICAL at 20:31

## 2021-02-13 RX ADMIN — Medication 1 TABLET: at 08:44

## 2021-02-13 RX ADMIN — LANSOPRAZOLE 30 MG: KIT at 06:27

## 2021-02-13 RX ADMIN — SODIUM CHLORIDE, PRESERVATIVE FREE 10 ML: 5 INJECTION INTRAVENOUS at 08:44

## 2021-02-13 RX ADMIN — OXYCODONE HYDROCHLORIDE 5 MG: 5 SOLUTION ORAL at 22:57

## 2021-02-13 RX ADMIN — INSULIN HUMAN 3 UNITS: 100 INJECTION, SOLUTION PARENTERAL at 12:15

## 2021-02-13 RX ADMIN — INSULIN HUMAN 5 UNITS: 100 INJECTION, SOLUTION PARENTERAL at 06:28

## 2021-02-13 RX ADMIN — HYDROCORTISONE: 1 CREAM TOPICAL at 08:44

## 2021-02-13 RX ADMIN — INSULIN HUMAN 3 UNITS: 100 INJECTION, SOLUTION PARENTERAL at 00:13

## 2021-02-13 RX ADMIN — INSULIN DETEMIR 15 UNITS: 100 INJECTION, SOLUTION SUBCUTANEOUS at 08:44

## 2021-02-13 RX ADMIN — ASPIRIN 81 MG CHEWABLE TABLET 81 MG: 81 TABLET CHEWABLE at 08:44

## 2021-02-13 RX ADMIN — CHLORHEXIDINE GLUCONATE 0.12% ORAL RINSE 15 ML: 1.2 LIQUID ORAL at 08:44

## 2021-02-13 RX ADMIN — CARVEDILOL 25 MG: 12.5 TABLET, FILM COATED ORAL at 17:59

## 2021-02-13 RX ADMIN — CARVEDILOL 25 MG: 12.5 TABLET, FILM COATED ORAL at 08:44

## 2021-02-14 LAB
ALBUMIN SERPL-MCNC: 3.3 G/DL (ref 3.5–5.2)
ALBUMIN/GLOB SERPL: 0.9 G/DL
ALP SERPL-CCNC: 92 U/L (ref 39–117)
ALT SERPL W P-5'-P-CCNC: 5 U/L (ref 1–33)
ANION GAP SERPL CALCULATED.3IONS-SCNC: 13 MMOL/L (ref 5–15)
AST SERPL-CCNC: 15 U/L (ref 1–32)
BILIRUB SERPL-MCNC: 0.3 MG/DL (ref 0–1.2)
BUN SERPL-MCNC: 53 MG/DL (ref 8–23)
BUN/CREAT SERPL: 14.4 (ref 7–25)
CALCIUM SPEC-SCNC: 9.2 MG/DL (ref 8.6–10.5)
CHLORIDE SERPL-SCNC: 97 MMOL/L (ref 98–107)
CO2 SERPL-SCNC: 23 MMOL/L (ref 22–29)
CREAT SERPL-MCNC: 3.67 MG/DL (ref 0.57–1)
DEPRECATED RDW RBC AUTO: 70.4 FL (ref 37–54)
ERYTHROCYTE [DISTWIDTH] IN BLOOD BY AUTOMATED COUNT: 17.6 % (ref 12.3–15.4)
GFR SERPL CREATININE-BSD FRML MDRD: 15 ML/MIN/1.73
GLOBULIN UR ELPH-MCNC: 3.7 GM/DL
GLUCOSE BLDC GLUCOMTR-MCNC: 148 MG/DL (ref 70–130)
GLUCOSE BLDC GLUCOMTR-MCNC: 199 MG/DL (ref 70–130)
GLUCOSE BLDC GLUCOMTR-MCNC: 219 MG/DL (ref 70–130)
GLUCOSE BLDC GLUCOMTR-MCNC: 228 MG/DL (ref 70–130)
GLUCOSE SERPL-MCNC: 143 MG/DL (ref 65–99)
HCT VFR BLD AUTO: 27.5 % (ref 34–46.6)
HGB BLD-MCNC: 7.7 G/DL (ref 12–15.9)
INR PPP: 1.21 (ref 0.85–1.16)
MAGNESIUM SERPL-MCNC: 2.3 MG/DL (ref 1.6–2.4)
MCH RBC QN AUTO: 30.4 PG (ref 26.6–33)
MCHC RBC AUTO-ENTMCNC: 28 G/DL (ref 31.5–35.7)
MCV RBC AUTO: 108.7 FL (ref 79–97)
PHOSPHATE SERPL-MCNC: 3.6 MG/DL (ref 2.5–4.5)
PLATELET # BLD AUTO: 351 10*3/MM3 (ref 140–450)
PMV BLD AUTO: 10.3 FL (ref 6–12)
POTASSIUM SERPL-SCNC: 3.8 MMOL/L (ref 3.5–5.2)
PROT SERPL-MCNC: 7 G/DL (ref 6–8.5)
PROTHROMBIN TIME: 15 SECONDS (ref 11.5–14)
RBC # BLD AUTO: 2.53 10*6/MM3 (ref 3.77–5.28)
SODIUM SERPL-SCNC: 133 MMOL/L (ref 136–145)
WBC # BLD AUTO: 10.36 10*3/MM3 (ref 3.4–10.8)

## 2021-02-14 PROCEDURE — 82962 GLUCOSE BLOOD TEST: CPT

## 2021-02-14 PROCEDURE — 83735 ASSAY OF MAGNESIUM: CPT | Performed by: INTERNAL MEDICINE

## 2021-02-14 PROCEDURE — 99232 SBSQ HOSP IP/OBS MODERATE 35: CPT | Performed by: INTERNAL MEDICINE

## 2021-02-14 PROCEDURE — 85610 PROTHROMBIN TIME: CPT

## 2021-02-14 PROCEDURE — 85027 COMPLETE CBC AUTOMATED: CPT | Performed by: INTERNAL MEDICINE

## 2021-02-14 PROCEDURE — 94799 UNLISTED PULMONARY SVC/PX: CPT

## 2021-02-14 PROCEDURE — 63710000001 INSULIN REGULAR HUMAN PER 5 UNITS: Performed by: SURGERY

## 2021-02-14 PROCEDURE — 94003 VENT MGMT INPAT SUBQ DAY: CPT

## 2021-02-14 PROCEDURE — 84100 ASSAY OF PHOSPHORUS: CPT | Performed by: INTERNAL MEDICINE

## 2021-02-14 PROCEDURE — 63710000001 INSULIN DETEMIR PER 5 UNITS: Performed by: SURGERY

## 2021-02-14 PROCEDURE — 80053 COMPREHEN METABOLIC PANEL: CPT | Performed by: INTERNAL MEDICINE

## 2021-02-14 RX ADMIN — Medication 1 TABLET: at 08:58

## 2021-02-14 RX ADMIN — CARVEDILOL 25 MG: 12.5 TABLET, FILM COATED ORAL at 08:57

## 2021-02-14 RX ADMIN — INSULIN DETEMIR 15 UNITS: 100 INJECTION, SOLUTION SUBCUTANEOUS at 08:58

## 2021-02-14 RX ADMIN — CARVEDILOL 25 MG: 12.5 TABLET, FILM COATED ORAL at 18:26

## 2021-02-14 RX ADMIN — INSULIN HUMAN 5 UNITS: 100 INJECTION, SOLUTION PARENTERAL at 18:25

## 2021-02-14 RX ADMIN — ASPIRIN 81 MG CHEWABLE TABLET 81 MG: 81 TABLET CHEWABLE at 08:58

## 2021-02-14 RX ADMIN — HYDROCORTISONE: 1 CREAM TOPICAL at 08:57

## 2021-02-14 RX ADMIN — INSULIN HUMAN 3 UNITS: 100 INJECTION, SOLUTION PARENTERAL at 00:48

## 2021-02-14 RX ADMIN — Medication 30 ML: at 08:58

## 2021-02-14 RX ADMIN — SODIUM CHLORIDE, PRESERVATIVE FREE 10 ML: 5 INJECTION INTRAVENOUS at 19:46

## 2021-02-14 RX ADMIN — SERTRALINE HYDROCHLORIDE 25 MG: 25 TABLET ORAL at 08:57

## 2021-02-14 RX ADMIN — ACETAMINOPHEN ORAL SOLUTION 500 MG: 650 SOLUTION ORAL at 13:40

## 2021-02-14 RX ADMIN — SODIUM CHLORIDE, PRESERVATIVE FREE 10 ML: 5 INJECTION INTRAVENOUS at 08:58

## 2021-02-14 RX ADMIN — Medication 30 ML: at 19:44

## 2021-02-14 RX ADMIN — IPRATROPIUM BROMIDE AND ALBUTEROL SULFATE 3 ML: 2.5; .5 SOLUTION RESPIRATORY (INHALATION) at 21:09

## 2021-02-14 RX ADMIN — CHLORHEXIDINE GLUCONATE 0.12% ORAL RINSE 15 ML: 1.2 LIQUID ORAL at 08:58

## 2021-02-14 RX ADMIN — INSULIN HUMAN 3 UNITS: 100 INJECTION, SOLUTION PARENTERAL at 12:39

## 2021-02-14 RX ADMIN — WARFARIN SODIUM 7.5 MG: 7.5 TABLET ORAL at 18:26

## 2021-02-14 RX ADMIN — ATORVASTATIN CALCIUM 80 MG: 40 TABLET, FILM COATED ORAL at 19:45

## 2021-02-14 RX ADMIN — CHLORHEXIDINE GLUCONATE 0.12% ORAL RINSE 15 ML: 1.2 LIQUID ORAL at 19:45

## 2021-02-15 ENCOUNTER — APPOINTMENT (OUTPATIENT)
Dept: NEPHROLOGY | Facility: HOSPITAL | Age: 71
End: 2021-02-15

## 2021-02-15 LAB
GLUCOSE BLDC GLUCOMTR-MCNC: 132 MG/DL (ref 70–130)
GLUCOSE BLDC GLUCOMTR-MCNC: 147 MG/DL (ref 70–130)
GLUCOSE BLDC GLUCOMTR-MCNC: 191 MG/DL (ref 70–130)
GLUCOSE BLDC GLUCOMTR-MCNC: 232 MG/DL (ref 70–130)
INR PPP: 1.4 (ref 0.85–1.16)
PROTHROMBIN TIME: 16.9 SECONDS (ref 11.5–14)

## 2021-02-15 PROCEDURE — 94799 UNLISTED PULMONARY SVC/PX: CPT

## 2021-02-15 PROCEDURE — 97110 THERAPEUTIC EXERCISES: CPT

## 2021-02-15 PROCEDURE — 85610 PROTHROMBIN TIME: CPT

## 2021-02-15 PROCEDURE — 82962 GLUCOSE BLOOD TEST: CPT

## 2021-02-15 PROCEDURE — 99232 SBSQ HOSP IP/OBS MODERATE 35: CPT | Performed by: INTERNAL MEDICINE

## 2021-02-15 PROCEDURE — 25010000002 ALBUMIN HUMAN 25% PER 50 ML: Performed by: INTERNAL MEDICINE

## 2021-02-15 PROCEDURE — 25010000002 EPOETIN ALFA-EPBX 10000 UNIT/ML SOLUTION: Performed by: SURGERY

## 2021-02-15 PROCEDURE — 63710000001 INSULIN REGULAR HUMAN PER 5 UNITS: Performed by: SURGERY

## 2021-02-15 PROCEDURE — 94003 VENT MGMT INPAT SUBQ DAY: CPT

## 2021-02-15 PROCEDURE — P9047 ALBUMIN (HUMAN), 25%, 50ML: HCPCS | Performed by: INTERNAL MEDICINE

## 2021-02-15 PROCEDURE — 63710000001 INSULIN DETEMIR PER 5 UNITS: Performed by: SURGERY

## 2021-02-15 RX ORDER — CHOLECALCIFEROL (VITAMIN D3) 125 MCG
5 CAPSULE ORAL NIGHTLY PRN
Status: DISCONTINUED | OUTPATIENT
Start: 2021-02-15 | End: 2021-02-17 | Stop reason: HOSPADM

## 2021-02-15 RX ORDER — ALBUMIN (HUMAN) 12.5 G/50ML
12.5 SOLUTION INTRAVENOUS AS NEEDED
Status: COMPLETED | OUTPATIENT
Start: 2021-02-15 | End: 2021-02-15

## 2021-02-15 RX ORDER — LORAZEPAM 0.5 MG/1
0.5 TABLET ORAL ONCE
Status: COMPLETED | OUTPATIENT
Start: 2021-02-15 | End: 2021-02-15

## 2021-02-15 RX ADMIN — TERAZOSIN HYDROCHLORIDE 1 MG: 1 CAPSULE ORAL at 21:38

## 2021-02-15 RX ADMIN — INSULIN HUMAN 3 UNITS: 100 INJECTION, SOLUTION PARENTERAL at 06:15

## 2021-02-15 RX ADMIN — ALBUMIN HUMAN 12.5 G: 0.25 SOLUTION INTRAVENOUS at 09:51

## 2021-02-15 RX ADMIN — CHLORHEXIDINE GLUCONATE 0.12% ORAL RINSE 15 ML: 1.2 LIQUID ORAL at 21:40

## 2021-02-15 RX ADMIN — INSULIN HUMAN 5 UNITS: 100 INJECTION, SOLUTION PARENTERAL at 18:25

## 2021-02-15 RX ADMIN — SODIUM CHLORIDE, PRESERVATIVE FREE 10 ML: 5 INJECTION INTRAVENOUS at 21:40

## 2021-02-15 RX ADMIN — WARFARIN SODIUM 7.5 MG: 7.5 TABLET ORAL at 18:26

## 2021-02-15 RX ADMIN — OXYCODONE HYDROCHLORIDE 5 MG: 5 SOLUTION ORAL at 01:01

## 2021-02-15 RX ADMIN — ALBUMIN HUMAN 12.5 G: 0.25 SOLUTION INTRAVENOUS at 08:29

## 2021-02-15 RX ADMIN — EPOETIN ALFA-EPBX 20000 UNITS: 10000 INJECTION, SOLUTION INTRAVENOUS; SUBCUTANEOUS at 11:06

## 2021-02-15 RX ADMIN — ALBUMIN HUMAN 12.5 G: 0.25 SOLUTION INTRAVENOUS at 09:57

## 2021-02-15 RX ADMIN — INSULIN HUMAN 5 UNITS: 100 INJECTION, SOLUTION PARENTERAL at 00:20

## 2021-02-15 RX ADMIN — Medication 1 TABLET: at 11:56

## 2021-02-15 RX ADMIN — HYDROCORTISONE: 1 CREAM TOPICAL at 08:35

## 2021-02-15 RX ADMIN — ACETAMINOPHEN ORAL SOLUTION 500 MG: 650 SOLUTION ORAL at 15:49

## 2021-02-15 RX ADMIN — OXYCODONE HYDROCHLORIDE 5 MG: 5 SOLUTION ORAL at 21:39

## 2021-02-15 RX ADMIN — SERTRALINE HYDROCHLORIDE 25 MG: 25 TABLET ORAL at 11:57

## 2021-02-15 RX ADMIN — CHLORHEXIDINE GLUCONATE 0.12% ORAL RINSE 15 ML: 1.2 LIQUID ORAL at 08:35

## 2021-02-15 RX ADMIN — HYDROCORTISONE 1 APPLICATION: 1 CREAM TOPICAL at 21:40

## 2021-02-15 RX ADMIN — ATORVASTATIN CALCIUM 80 MG: 40 TABLET, FILM COATED ORAL at 21:38

## 2021-02-15 RX ADMIN — Medication 30 ML: at 21:41

## 2021-02-15 RX ADMIN — LORAZEPAM 0.5 MG: 0.5 TABLET ORAL at 21:38

## 2021-02-15 RX ADMIN — INSULIN DETEMIR 15 UNITS: 100 INJECTION, SOLUTION SUBCUTANEOUS at 11:56

## 2021-02-15 RX ADMIN — SODIUM CHLORIDE, PRESERVATIVE FREE 10 ML: 5 INJECTION INTRAVENOUS at 08:35

## 2021-02-15 RX ADMIN — ALBUMIN HUMAN 12.5 G: 0.25 SOLUTION INTRAVENOUS at 08:27

## 2021-02-15 RX ADMIN — Medication 5 MG: at 21:40

## 2021-02-15 RX ADMIN — LANSOPRAZOLE 30 MG: KIT at 06:16

## 2021-02-15 RX ADMIN — Medication 30 ML: at 11:55

## 2021-02-15 RX ADMIN — ASPIRIN 81 MG CHEWABLE TABLET 81 MG: 81 TABLET CHEWABLE at 11:57

## 2021-02-16 LAB
ALBUMIN SERPL-MCNC: 4.1 G/DL (ref 3.5–5.2)
ALBUMIN/GLOB SERPL: 1.1 G/DL
ALP SERPL-CCNC: 85 U/L (ref 39–117)
ALT SERPL W P-5'-P-CCNC: 5 U/L (ref 1–33)
ANION GAP SERPL CALCULATED.3IONS-SCNC: 12 MMOL/L (ref 5–15)
AST SERPL-CCNC: 12 U/L (ref 1–32)
BILIRUB SERPL-MCNC: 0.5 MG/DL (ref 0–1.2)
BUN SERPL-MCNC: 44 MG/DL (ref 8–23)
BUN/CREAT SERPL: 13.5 (ref 7–25)
CALCIUM SPEC-SCNC: 9.6 MG/DL (ref 8.6–10.5)
CHLORIDE SERPL-SCNC: 99 MMOL/L (ref 98–107)
CO2 SERPL-SCNC: 26 MMOL/L (ref 22–29)
CREAT SERPL-MCNC: 3.26 MG/DL (ref 0.57–1)
DEPRECATED RDW RBC AUTO: 68.4 FL (ref 37–54)
ERYTHROCYTE [DISTWIDTH] IN BLOOD BY AUTOMATED COUNT: 17.7 % (ref 12.3–15.4)
GFR SERPL CREATININE-BSD FRML MDRD: 17 ML/MIN/1.73
GLOBULIN UR ELPH-MCNC: 3.6 GM/DL
GLUCOSE BLDC GLUCOMTR-MCNC: 152 MG/DL (ref 70–130)
GLUCOSE BLDC GLUCOMTR-MCNC: 168 MG/DL (ref 70–130)
GLUCOSE BLDC GLUCOMTR-MCNC: 183 MG/DL (ref 70–130)
GLUCOSE BLDC GLUCOMTR-MCNC: 186 MG/DL (ref 70–130)
GLUCOSE SERPL-MCNC: 137 MG/DL (ref 65–99)
HCT VFR BLD AUTO: 29.1 % (ref 34–46.6)
HGB BLD-MCNC: 8.2 G/DL (ref 12–15.9)
INR PPP: 1.59 (ref 0.85–1.16)
MAGNESIUM SERPL-MCNC: 2.2 MG/DL (ref 1.6–2.4)
MCH RBC QN AUTO: 29.9 PG (ref 26.6–33)
MCHC RBC AUTO-ENTMCNC: 28.2 G/DL (ref 31.5–35.7)
MCV RBC AUTO: 106.2 FL (ref 79–97)
PHOSPHATE SERPL-MCNC: 2.6 MG/DL (ref 2.5–4.5)
PLATELET # BLD AUTO: 368 10*3/MM3 (ref 140–450)
PMV BLD AUTO: 10 FL (ref 6–12)
POTASSIUM SERPL-SCNC: 4.1 MMOL/L (ref 3.5–5.2)
PROT SERPL-MCNC: 7.7 G/DL (ref 6–8.5)
PROTHROMBIN TIME: 18.6 SECONDS (ref 11.5–14)
RBC # BLD AUTO: 2.74 10*6/MM3 (ref 3.77–5.28)
SODIUM SERPL-SCNC: 137 MMOL/L (ref 136–145)
WBC # BLD AUTO: 10.19 10*3/MM3 (ref 3.4–10.8)

## 2021-02-16 PROCEDURE — 94799 UNLISTED PULMONARY SVC/PX: CPT

## 2021-02-16 PROCEDURE — 82962 GLUCOSE BLOOD TEST: CPT

## 2021-02-16 PROCEDURE — 63710000001 INSULIN REGULAR HUMAN PER 5 UNITS: Performed by: SURGERY

## 2021-02-16 PROCEDURE — 80053 COMPREHEN METABOLIC PANEL: CPT | Performed by: INTERNAL MEDICINE

## 2021-02-16 PROCEDURE — 94003 VENT MGMT INPAT SUBQ DAY: CPT

## 2021-02-16 PROCEDURE — 83735 ASSAY OF MAGNESIUM: CPT | Performed by: INTERNAL MEDICINE

## 2021-02-16 PROCEDURE — 63710000001 INSULIN DETEMIR PER 5 UNITS: Performed by: SURGERY

## 2021-02-16 PROCEDURE — 84100 ASSAY OF PHOSPHORUS: CPT | Performed by: INTERNAL MEDICINE

## 2021-02-16 PROCEDURE — 85027 COMPLETE CBC AUTOMATED: CPT | Performed by: INTERNAL MEDICINE

## 2021-02-16 PROCEDURE — 85610 PROTHROMBIN TIME: CPT

## 2021-02-16 PROCEDURE — 99232 SBSQ HOSP IP/OBS MODERATE 35: CPT | Performed by: INTERNAL MEDICINE

## 2021-02-16 RX ORDER — ALPRAZOLAM 0.5 MG/1
0.5 TABLET ORAL 2 TIMES DAILY PRN
Status: DISCONTINUED | OUTPATIENT
Start: 2021-02-16 | End: 2021-02-17 | Stop reason: HOSPADM

## 2021-02-16 RX ADMIN — INSULIN DETEMIR 15 UNITS: 100 INJECTION, SOLUTION SUBCUTANEOUS at 09:13

## 2021-02-16 RX ADMIN — OXYCODONE HYDROCHLORIDE 5 MG: 5 SOLUTION ORAL at 03:39

## 2021-02-16 RX ADMIN — ACETAMINOPHEN ORAL SOLUTION 500 MG: 650 SOLUTION ORAL at 03:39

## 2021-02-16 RX ADMIN — Medication 1 TABLET: at 09:01

## 2021-02-16 RX ADMIN — SERTRALINE HYDROCHLORIDE 25 MG: 25 TABLET ORAL at 09:01

## 2021-02-16 RX ADMIN — INSULIN HUMAN 3 UNITS: 100 INJECTION, SOLUTION PARENTERAL at 18:23

## 2021-02-16 RX ADMIN — Medication 30 ML: at 21:05

## 2021-02-16 RX ADMIN — CHLORHEXIDINE GLUCONATE 0.12% ORAL RINSE 15 ML: 1.2 LIQUID ORAL at 09:01

## 2021-02-16 RX ADMIN — HYDROCORTISONE: 1 CREAM TOPICAL at 21:05

## 2021-02-16 RX ADMIN — CHLORHEXIDINE GLUCONATE 0.12% ORAL RINSE 15 ML: 1.2 LIQUID ORAL at 21:05

## 2021-02-16 RX ADMIN — TERAZOSIN HYDROCHLORIDE 1 MG: 1 CAPSULE ORAL at 21:05

## 2021-02-16 RX ADMIN — Medication 30 ML: at 09:01

## 2021-02-16 RX ADMIN — WARFARIN SODIUM 7.5 MG: 7.5 TABLET ORAL at 18:23

## 2021-02-16 RX ADMIN — INSULIN HUMAN 3 UNITS: 100 INJECTION, SOLUTION PARENTERAL at 12:33

## 2021-02-16 RX ADMIN — HYDROCORTISONE: 1 CREAM TOPICAL at 09:04

## 2021-02-16 RX ADMIN — ASPIRIN 81 MG CHEWABLE TABLET 81 MG: 81 TABLET CHEWABLE at 09:01

## 2021-02-16 RX ADMIN — ATORVASTATIN CALCIUM 80 MG: 40 TABLET, FILM COATED ORAL at 21:05

## 2021-02-16 RX ADMIN — SODIUM CHLORIDE, PRESERVATIVE FREE 10 ML: 5 INJECTION INTRAVENOUS at 09:01

## 2021-02-16 RX ADMIN — INSULIN HUMAN 3 UNITS: 100 INJECTION, SOLUTION PARENTERAL at 06:19

## 2021-02-16 RX ADMIN — ALPRAZOLAM 0.5 MG: 0.5 TABLET ORAL at 09:12

## 2021-02-17 ENCOUNTER — APPOINTMENT (OUTPATIENT)
Dept: NEPHROLOGY | Facility: HOSPITAL | Age: 71
End: 2021-02-17

## 2021-02-17 VITALS
HEART RATE: 71 BPM | WEIGHT: 293 LBS | BODY MASS INDEX: 50.02 KG/M2 | OXYGEN SATURATION: 100 % | RESPIRATION RATE: 18 BRPM | HEIGHT: 64 IN | DIASTOLIC BLOOD PRESSURE: 57 MMHG | SYSTOLIC BLOOD PRESSURE: 130 MMHG | TEMPERATURE: 97.8 F

## 2021-02-17 LAB
GLUCOSE BLDC GLUCOMTR-MCNC: 161 MG/DL (ref 70–130)
INR PPP: 1.89 (ref 0.85–1.16)
PROTHROMBIN TIME: 21.4 SECONDS (ref 11.5–14)

## 2021-02-17 PROCEDURE — 94003 VENT MGMT INPAT SUBQ DAY: CPT

## 2021-02-17 PROCEDURE — 63710000001 INSULIN REGULAR HUMAN PER 5 UNITS: Performed by: SURGERY

## 2021-02-17 PROCEDURE — 85610 PROTHROMBIN TIME: CPT

## 2021-02-17 PROCEDURE — 25010000002 ALBUMIN HUMAN 25% PER 50 ML: Performed by: INTERNAL MEDICINE

## 2021-02-17 PROCEDURE — 82962 GLUCOSE BLOOD TEST: CPT

## 2021-02-17 PROCEDURE — 99239 HOSP IP/OBS DSCHRG MGMT >30: CPT | Performed by: NURSE PRACTITIONER

## 2021-02-17 PROCEDURE — 94799 UNLISTED PULMONARY SVC/PX: CPT

## 2021-02-17 PROCEDURE — 63710000001 INSULIN DETEMIR PER 5 UNITS: Performed by: SURGERY

## 2021-02-17 PROCEDURE — P9047 ALBUMIN (HUMAN), 25%, 50ML: HCPCS | Performed by: INTERNAL MEDICINE

## 2021-02-17 RX ORDER — ALBUMIN (HUMAN) 12.5 G/50ML
12.5 SOLUTION INTRAVENOUS AS NEEDED
Status: DISCONTINUED | OUTPATIENT
Start: 2021-02-17 | End: 2021-02-17 | Stop reason: HOSPADM

## 2021-02-17 RX ORDER — TERAZOSIN 1 MG/1
1 CAPSULE ORAL NIGHTLY
Start: 2021-02-17

## 2021-02-17 RX ORDER — CHLORHEXIDINE GLUCONATE 0.12 MG/ML
15 RINSE ORAL EVERY 12 HOURS SCHEDULED
Start: 2021-02-17

## 2021-02-17 RX ORDER — AMINO ACIDS/PROTEIN HYDROLYS 15G-100/30
30 LIQUID (ML) ORAL 2 TIMES DAILY
Start: 2021-02-17

## 2021-02-17 RX ORDER — ASPIRIN 81 MG/1
81 TABLET, CHEWABLE ORAL DAILY
Start: 2021-02-17

## 2021-02-17 RX ORDER — CHOLECALCIFEROL (VITAMIN D3) 125 MCG
5 CAPSULE ORAL NIGHTLY PRN
Start: 2021-02-17

## 2021-02-17 RX ORDER — HEPARIN SODIUM 1000 [USP'U]/ML
1800 INJECTION, SOLUTION INTRAVENOUS; SUBCUTANEOUS AS NEEDED
Start: 2021-02-17

## 2021-02-17 RX ORDER — LANSOPRAZOLE
30 KIT EVERY MORNING
Qty: 300 ML
Start: 2021-02-17

## 2021-02-17 RX ORDER — WARFARIN SODIUM 7.5 MG/1
TABLET ORAL
Start: 2021-02-17

## 2021-02-17 RX ORDER — ALPRAZOLAM 0.5 MG/1
0.5 TABLET ORAL 2 TIMES DAILY PRN
Start: 2021-02-17 | End: 2021-02-23

## 2021-02-17 RX ORDER — SERTRALINE HYDROCHLORIDE 25 MG/1
25 TABLET, FILM COATED ORAL DAILY
Start: 2021-02-17

## 2021-02-17 RX ORDER — ACETAMINOPHEN 160 MG/5ML
500 SOLUTION ORAL EVERY 6 HOURS PRN
Start: 2021-02-17

## 2021-02-17 RX ORDER — ATORVASTATIN CALCIUM 80 MG/1
80 TABLET, FILM COATED ORAL NIGHTLY
Start: 2021-02-17

## 2021-02-17 RX ORDER — DIAPER,BRIEF,INFANT-TODD,DISP
EACH MISCELLANEOUS EVERY 12 HOURS SCHEDULED
Start: 2021-02-17

## 2021-02-17 RX ADMIN — ALBUMIN HUMAN 12.5 G: 0.25 SOLUTION INTRAVENOUS at 08:33

## 2021-02-17 RX ADMIN — LANSOPRAZOLE 30 MG: KIT at 05:18

## 2021-02-17 RX ADMIN — HYDROCORTISONE: 1 CREAM TOPICAL at 10:21

## 2021-02-17 RX ADMIN — ALBUMIN HUMAN 12.5 G: 0.25 SOLUTION INTRAVENOUS at 08:17

## 2021-02-17 RX ADMIN — ASPIRIN 81 MG CHEWABLE TABLET 81 MG: 81 TABLET CHEWABLE at 10:20

## 2021-02-17 RX ADMIN — INSULIN DETEMIR 15 UNITS: 100 INJECTION, SOLUTION SUBCUTANEOUS at 10:58

## 2021-02-17 RX ADMIN — Medication 30 ML: at 10:19

## 2021-02-17 RX ADMIN — INSULIN HUMAN 3 UNITS: 100 INJECTION, SOLUTION PARENTERAL at 00:23

## 2021-02-17 RX ADMIN — Medication 1 TABLET: at 10:20

## 2021-02-17 RX ADMIN — SERTRALINE HYDROCHLORIDE 25 MG: 25 TABLET ORAL at 10:20

## 2021-02-17 RX ADMIN — INSULIN HUMAN 3 UNITS: 100 INJECTION, SOLUTION PARENTERAL at 05:17

## 2021-02-17 RX ADMIN — SODIUM CHLORIDE, PRESERVATIVE FREE 10 ML: 5 INJECTION INTRAVENOUS at 10:20

## 2021-02-23 LAB — GLUCOSE BLDC GLUCOMTR-MCNC: 233 MG/DL (ref 70–130)

## 2021-06-17 ENCOUNTER — APPOINTMENT (OUTPATIENT)
Dept: CT IMAGING | Age: 71
End: 2021-06-17
Payer: MEDICARE

## 2021-06-17 ENCOUNTER — APPOINTMENT (OUTPATIENT)
Dept: GENERAL RADIOLOGY | Age: 71
End: 2021-06-17
Payer: MEDICARE

## 2021-06-17 ENCOUNTER — HOSPITAL ENCOUNTER (EMERGENCY)
Age: 71
Discharge: LEFT BEFORE DISCHARGE | End: 2021-06-18
Attending: EMERGENCY MEDICINE
Payer: MEDICARE

## 2021-06-17 DIAGNOSIS — Z53.29 LEFT AGAINST MEDICAL ADVICE: ICD-10-CM

## 2021-06-17 DIAGNOSIS — R07.9 CHEST PAIN, UNSPECIFIED TYPE: Primary | ICD-10-CM

## 2021-06-17 DIAGNOSIS — K59.00 CONSTIPATION, UNSPECIFIED CONSTIPATION TYPE: ICD-10-CM

## 2021-06-17 DIAGNOSIS — R77.8 ELEVATED TROPONIN: ICD-10-CM

## 2021-06-17 LAB
A/G RATIO: 0.8 (ref 1.1–2.2)
ALBUMIN SERPL-MCNC: 3.6 G/DL (ref 3.4–5)
ALP BLD-CCNC: 138 U/L (ref 40–129)
ALT SERPL-CCNC: 6 U/L (ref 10–40)
ANION GAP SERPL CALCULATED.3IONS-SCNC: 10 MMOL/L (ref 3–16)
AST SERPL-CCNC: 11 U/L (ref 15–37)
BASE EXCESS VENOUS: 7 MMOL/L (ref -3–3)
BASOPHILS ABSOLUTE: 0 K/UL (ref 0–0.2)
BASOPHILS RELATIVE PERCENT: 0.5 %
BILIRUB SERPL-MCNC: 0.4 MG/DL (ref 0–1)
BUN BLDV-MCNC: 24 MG/DL (ref 7–20)
CALCIUM SERPL-MCNC: 9 MG/DL (ref 8.3–10.6)
CARBOXYHEMOGLOBIN: 1.1 % (ref 0–1.5)
CHLORIDE BLD-SCNC: 93 MMOL/L (ref 99–110)
CO2: 32 MMOL/L (ref 21–32)
CREAT SERPL-MCNC: 4.6 MG/DL (ref 0.6–1.2)
EKG ATRIAL RATE: 86 BPM
EKG DIAGNOSIS: NORMAL
EKG P AXIS: 61 DEGREES
EKG P-R INTERVAL: 242 MS
EKG Q-T INTERVAL: 376 MS
EKG QRS DURATION: 82 MS
EKG QTC CALCULATION (BAZETT): 449 MS
EKG R AXIS: -9 DEGREES
EKG T AXIS: 2 DEGREES
EKG VENTRICULAR RATE: 86 BPM
EOSINOPHILS ABSOLUTE: 0.4 K/UL (ref 0–0.6)
EOSINOPHILS RELATIVE PERCENT: 4.7 %
GFR AFRICAN AMERICAN: 11
GFR NON-AFRICAN AMERICAN: 9
GLOBULIN: 4.6 G/DL
GLUCOSE BLD-MCNC: 161 MG/DL (ref 70–99)
HCO3 VENOUS: 34.9 MMOL/L (ref 23–29)
HCT VFR BLD CALC: 26.6 % (ref 36–48)
HEMOGLOBIN: 8.5 G/DL (ref 12–16)
INR BLD: 3.56 (ref 0.86–1.14)
LIPASE: 36 U/L (ref 13–60)
LYMPHOCYTES ABSOLUTE: 0.8 K/UL (ref 1–5.1)
LYMPHOCYTES RELATIVE PERCENT: 10.7 %
MCH RBC QN AUTO: 32.2 PG (ref 26–34)
MCHC RBC AUTO-ENTMCNC: 32.1 G/DL (ref 31–36)
MCV RBC AUTO: 100.3 FL (ref 80–100)
METHEMOGLOBIN VENOUS: 0.3 %
MONOCYTES ABSOLUTE: 0.5 K/UL (ref 0–1.3)
MONOCYTES RELATIVE PERCENT: 6.1 %
NEUTROPHILS ABSOLUTE: 6.1 K/UL (ref 1.7–7.7)
NEUTROPHILS RELATIVE PERCENT: 78 %
O2 CONTENT, VEN: 8 VOL %
O2 SAT, VEN: 56 %
O2 THERAPY: ABNORMAL
PCO2, VEN: 71.4 MMHG (ref 40–50)
PDW BLD-RTO: 17.7 % (ref 12.4–15.4)
PH VENOUS: 7.31 (ref 7.35–7.45)
PLATELET # BLD: 294 K/UL (ref 135–450)
PMV BLD AUTO: 7.3 FL (ref 5–10.5)
PO2, VEN: 33.5 MMHG (ref 25–40)
POTASSIUM REFLEX MAGNESIUM: 4.7 MMOL/L (ref 3.5–5.1)
PRO-BNP: 6377 PG/ML (ref 0–124)
PROTHROMBIN TIME: 41.8 SEC (ref 10–13.2)
RBC # BLD: 2.65 M/UL (ref 4–5.2)
SODIUM BLD-SCNC: 135 MMOL/L (ref 136–145)
TCO2 CALC VENOUS: 37 MMOL/L
TOTAL PROTEIN: 8.2 G/DL (ref 6.4–8.2)
TROPONIN: 0.18 NG/ML
TROPONIN: 0.18 NG/ML
WBC # BLD: 7.8 K/UL (ref 4–11)

## 2021-06-17 PROCEDURE — 83690 ASSAY OF LIPASE: CPT

## 2021-06-17 PROCEDURE — 85025 COMPLETE CBC W/AUTO DIFF WBC: CPT

## 2021-06-17 PROCEDURE — 80053 COMPREHEN METABOLIC PANEL: CPT

## 2021-06-17 PROCEDURE — 74018 RADEX ABDOMEN 1 VIEW: CPT

## 2021-06-17 PROCEDURE — 99284 EMERGENCY DEPT VISIT MOD MDM: CPT

## 2021-06-17 PROCEDURE — 85610 PROTHROMBIN TIME: CPT

## 2021-06-17 PROCEDURE — 82803 BLOOD GASES ANY COMBINATION: CPT

## 2021-06-17 PROCEDURE — 84484 ASSAY OF TROPONIN QUANT: CPT

## 2021-06-17 PROCEDURE — 83880 ASSAY OF NATRIURETIC PEPTIDE: CPT

## 2021-06-17 PROCEDURE — 93005 ELECTROCARDIOGRAM TRACING: CPT | Performed by: EMERGENCY MEDICINE

## 2021-06-17 PROCEDURE — 71045 X-RAY EXAM CHEST 1 VIEW: CPT

## 2021-06-17 PROCEDURE — 93010 ELECTROCARDIOGRAM REPORT: CPT | Performed by: INTERNAL MEDICINE

## 2021-06-17 PROCEDURE — 6370000000 HC RX 637 (ALT 250 FOR IP): Performed by: EMERGENCY MEDICINE

## 2021-06-17 RX ORDER — BISACODYL 10 MG
10 SUPPOSITORY, RECTAL RECTAL ONCE
Status: COMPLETED | OUTPATIENT
Start: 2021-06-17 | End: 2021-06-17

## 2021-06-17 RX ORDER — ASPIRIN 325 MG
325 TABLET ORAL ONCE
Status: COMPLETED | OUTPATIENT
Start: 2021-06-17 | End: 2021-06-17

## 2021-06-17 RX ADMIN — ASPIRIN 325 MG: 325 TABLET ORAL at 08:37

## 2021-06-17 RX ADMIN — BISACODYL 10 MG: 10 SUPPOSITORY RECTAL at 11:31

## 2021-06-17 ASSESSMENT — PAIN DESCRIPTION - LOCATION: LOCATION: CHEST

## 2021-06-17 ASSESSMENT — ENCOUNTER SYMPTOMS
NAUSEA: 0
VOMITING: 0
SORE THROAT: 0
BACK PAIN: 0
CONSTIPATION: 1
SHORTNESS OF BREATH: 1
COUGH: 0
ABDOMINAL PAIN: 0

## 2021-06-17 ASSESSMENT — PULMONARY FUNCTION TESTS
PIF_VALUE: 37
PIF_VALUE: 26
PIF_VALUE: 31
PIF_VALUE: 30

## 2021-06-17 ASSESSMENT — PAIN SCALES - GENERAL: PAINLEVEL_OUTOF10: 9

## 2021-06-17 NOTE — ED NOTES
Called pt's daughter to give update. Will call again upon pt transfer. Pt is aware.      Alejandra Huntley RN  06/17/21 1416

## 2021-06-17 NOTE — ED NOTES
Pt received evening meal tray. Hospital bed brought to ER from floor. Vel Phy lift used to place pt in new bed. Pt looks much more comfortable and reports feeling better. Still c/o bowel issues. MD aware.      Summer Wooten RN  06/17/21 1094

## 2021-06-17 NOTE — ED PROVIDER NOTES
The patient's care was signed out to my by the preceding provider. Please refer to their documentation for further information. CHIEF COMPLAINT  Chief Complaint   Patient presents with    Chest Pain     Pt comes from next door, [facility], after arriving there for dialysis and c/o chest pain. Pt did not get dialysis. Briefly, Travon Waters is a 80 y.o. female  who presents to the ED complaining of chest pain    FOCUSED PHYSICAL EXAMINATION  BP (!) 140/110   Pulse 81   Temp 98.1 °F (36.7 °C) (Oral)   Resp 29   Wt (!) 310 lb 12.8 oz (141 kg)   SpO2 100%      Focused physical examination:  General appearance:  Cooperative. Morbidly obese -American female in no acute distress  Skin:  Warm. Dry. Eye:  Extraocular movements intact. Ears, nose, mouth and throat:  Oral mucosa moist,  Neck:  Trachea midline. Heart:  Regular rate and rhythm  Perfusion:  intact  Respiratory: Currently on ventilator. No increased work of breathing. Very distant breath sounds  Abdominal:   Non distended. Nontender  Neurological:  Alert and oriented x 3. Moves all extremities spontaneously  Musculoskeletal:   Normal ROM, no deformities          Psychiatric:  Normal mood      MDM: Patient presents to the emergency department today with complaints of chest pain. History of chronic hypercapnic respiratory failure on the ventilator. Presenting with chest pain. EKG with perhaps some lateral ST depressions. No ST elevation. No prior to compare here. Known reported history of coronary artery disease. Found to have elevated troponin of 0.18. Does have a history of end-stage renal disease has not received dialysis today. Unclear what baseline troponin is but given his elevation in the setting of active chest pain with a patient having known coronary artery disease plan to transfer to [facility] for further cardiac evaluation. She is resting comfortably on reevaluation here.   She is slightly supratherapeutic with her INR and I have low overall suspicion for PE. Does not need heparin given this baseline anticoagulation. During the patient's ED course, the patient was given:  Medications   aspirin tablet 325 mg (has no administration in time range)        CLINICAL IMPRESSION  1. Chest pain, unspecified type    2. Elevated troponin        DISPOSITION  Transfer      This chart was created using Dragon dictation software. Efforts were made by me to ensure accuracy, however some errors may be present due to limitations of this technology.        Stephan Paez MD  06/17/21 0418

## 2021-06-17 NOTE — PROGRESS NOTES
06/17/21 0609   Vent Information   Vent Type 840   Vent Mode AC/VC   Vt Ordered 450 mL   Rate Set 14 bmp   Peak Flow 65 L/min   FiO2  45 %   SpO2 99 %   SpO2/FiO2 ratio 220   Sensitivity 3   PEEP/CPAP 5   Humidification Source Heated wire   Humidification Temp 37   Humidification Temp Measured 36.2   Vent Patient Data   Peak Inspiratory Pressure 26 cmH2O   Mean Airway Pressure 11 cmH20   Rate Measured 21 br/min   Vt Exhaled 466 mL   Minute Volume 9.91 Liters   I:E Ratio 1:2.2   Plateau Pressure 20 EUM16   Static Compliance 31 mL/cmH2O   Dynamic Compliance 22 mL/cmH2O   Cough/Sputum   Sputum How Obtained Tracheal;Suctioned   Cough Productive   Sputum Amount Small   Sputum Color Creamy   Tenacity Thick   Breath Sounds   Right Upper Lobe Diminished   Right Middle Lobe Diminished   Right Lower Lobe Diminished   Left Upper Lobe Diminished   Left Lower Lobe Diminished   Additional Respiratory  Assessments   Resp 21   Position Semi-Ríos's   Alarm Settings   High Pressure Alarm 40 cmH2O   Low Minute Volume Alarm 4 L/min   Apnea (secs) 20 secs   High Respiratory Rate 40 br/min   Low Exhaled Vt  250 mL   Patient Observation   Observations #6 dawna xlt distal.   ambu bag at head of bed.  water good.

## 2021-06-17 NOTE — ED NOTES
[id number]  Transfer center called with information, and request for [facility] hospitalist to consult with Dr. Lupillo Pichardo  06/17/21 [id number]  Dr. Blanca Conner called reference [facility] transfer     [place]  06/17/21 1602

## 2021-06-17 NOTE — ED PROVIDER NOTES
[address] ED  EMERGENCY DEPARTMENT ENCOUNTER      Pt Name: Iza Rodriges  MRN: [id number]  [place] 1950  Date of evaluation: 6/17/2021  Provider: Marcello Abdalla MD    [address] [place] [address]       Chief Complaint   Patient presents with    Chest Pain     Pt comes from next door, [facility], after arriving there for dialysis and c/o chest pain. Pt did not get dialysis. HISTORY OF PRESENT ILLNESS   (Location/Symptom, Timing/Onset, Context/Setting, Quality, Duration, Modifying Factors, Severity)  Note limiting factors. Iza Rodriges is a 80 y.o. female who presents to the emergency department     Is a 80-year-old female with a past medical history of respiratory failure on chronic ventilation via tracheostomy, end-stage renal disease on dialysis, DVT on Coumadin, hypertension, diabetes, CHF, and anxiety who presents with chest pain. Patient reportedly started having chest pain at 3 AM this morning. She did not share this with the nursing staff but did share with the dialysis nurses when she got to dialysis today. Patient did not get dialyzed and instead was sent to the emergency department. Patient states that the pain is located on the right side as well as in the center of her chest.  She states that she has had pain like this before. She rates it as a 9 out of 10 on the pain scale. Does report associated shortness of breath. Has felt constipated lately. The history is provided by the patient and the EMS personnel. Nursing Notes were reviewed. REVIEW OF SYSTEMS    (2-9 systems for level 4, 10 or more for level 5)     Review of Systems   Constitutional: Negative for chills and fever. HENT: Negative for congestion and sore throat. Eyes: Negative for visual disturbance. Respiratory: Positive for shortness of breath. Negative for cough. Cardiovascular: Positive for chest pain. Negative for leg swelling. Gastrointestinal: Positive for constipation. Negative for abdominal pain, nausea and vomiting. Genitourinary: Negative for dysuria and hematuria. Musculoskeletal: Negative for back pain and neck pain. Skin: Negative for pallor and rash. Neurological: Negative for light-headedness and headaches. All other systems reviewed and are negative. Except as noted above the remainder of the review of systems was reviewed and negative. PAST MEDICAL HISTORY   No past medical history on file. SURGICAL HISTORY     No past surgical history on file. CURRENT MEDICATIONS       Previous Medications    No medications on file       ALLERGIES     Patient has no allergy information on record. FAMILY HISTORY     No family history on file. SOCIAL HISTORY       Social History     Socioeconomic History    Marital status:      Spouse name: Not on file    Number of children: Not on file    Years of education: Not on file    Highest education level: Not on file   Occupational History    Not on file   Tobacco Use    Smoking status: Not on file   Substance and Sexual Activity    Alcohol use: Not on file    Drug use: Not on file    Sexual activity: Not on file   Other Topics Concern    Not on file   Social History Narrative    Not on file     Social Determinants of Health     Financial Resource Strain:     Difficulty of Paying Living Expenses:    Food Insecurity:     Worried About Running Out of Food in the Last Year:     [address] in the Last Year:    Transportation Needs:     Lack of Transportation (Medical):      Lack of Transportation (Non-Medical):    Physical Activity:     Days of Exercise per Week:     Minutes of Exercise per Session:    Stress:     Feeling of Stress :    Social Connections:     Frequency of Communication with Friends and Family:     Frequency of Social Gatherings with Friends and Family:     Attends [employer] Services:     Active Member of Clubs or Organizations:     Attends Club or Organization Meetings:     Marital Status:    Intimate Partner Violence:     Fear of Current or Ex-Partner:     Emotionally Abused:     Physically Abused:     Sexually Abused:        SCREENINGS               PHYSICAL EXAM    (up to 7 for level 4, 8 or more for level 5)     ED Triage Vitals   BP Temp Temp src Pulse Resp SpO2 Height Weight   -- -- -- -- -- -- -- --       Physical Exam  Vitals and nursing note reviewed. Constitutional:       General: She is not in acute distress. Appearance: Normal appearance. HENT:      Head: Normocephalic and atraumatic. Nose: Nose normal. No congestion. Mouth/Throat:      Mouth: Mucous membranes are moist.   Eyes:      Conjunctiva/sclera: Conjunctivae normal.   Cardiovascular:      Rate and Rhythm: Normal rate and regular rhythm. Pulses: Normal pulses. Heart sounds: Normal heart sounds. No murmur heard. Pulmonary:      Effort: Pulmonary effort is normal. No respiratory distress. Breath sounds: Normal breath sounds. No wheezing or rhonchi. Abdominal:      General: There is no distension. Palpations: Abdomen is soft. Tenderness: There is no abdominal tenderness. Musculoskeletal:         General: No swelling or deformity. Normal range of motion. Cervical back: Normal range of motion and neck supple. Skin:     General: Skin is warm and dry. Neurological:      General: No focal deficit present. Mental Status: She is alert and oriented to person, place, and time. DIAGNOSTIC RESULTS     EKG: All EKG's are interpreted by the Emergency Department Physician who either signs or Co-signs this chart in the absence of a cardiologist.    Sinus rhythm, rate 86, first-degree AV block, no STEMI    RADIOLOGY:     Interpretation per the Radiologist below, if available at the time of this note:    XR CHEST PORTABLE   Final Result   Cardiomegaly.   No pneumonia or edema               LABS:  Labs Reviewed   CBC WITH AUTO DIFFERENTIAL   COMPREHENSIVE METABOLIC PANEL W/ REFLEX TO MG FOR LOW K   LIPASE   TROPONIN   PROTIME-INR   BLOOD GAS, VENOUS   BRAIN NATRIURETIC PEPTIDE       All other labs were within normal range or not returned as of this dictation. EMERGENCY DEPARTMENT COURSE and DIFFERENTIAL DIAGNOSIS/MDM:   Vitals:    Vitals:    06/17/21 0609 06/17/21 0624   BP:  (!) 136/52   Pulse:  87   Resp: 21 24   Temp:  98.1 °F (36.7 °C)   TempSrc:  Oral   SpO2: 99% 100%   Weight:  (!) 310 lb 12.8 oz (141 kg)       Patient evaluated and previous record reviewed. Patient presents with chest pain that began at 3 AM this morning. Vital signs are stable and within normal limits. Physical exam as documented above. EKG without acute changes. We will plan to obtain lab work-up. Chest x-ray shows cardiomegaly without evidence of pneumonia or edema. Disposition is pending at this time. CONSULTS:  None    PROCEDURES:  Unless otherwise noted below, none     Procedures      FINAL IMPRESSION      1. Chest pain, unspecified type          DISPOSITION/PLAN   DISPOSITION        PATIENT REFERRED TO:  No follow-up provider specified. DISCHARGE MEDICATIONS:  New Prescriptions    No medications on file     Controlled Substances Monitoring:     No flowsheet data found.     (Please note that portions of this note were completed with a voice recognition program.  Efforts were made to edit the dictations but occasionally words are mis-transcribed.)    Ramos Rose MD (electronically signed)  Attending Emergency Physician           Letitia Stephenson MD  06/17/21 4671

## 2021-06-18 VITALS
DIASTOLIC BLOOD PRESSURE: 63 MMHG | OXYGEN SATURATION: 99 % | TEMPERATURE: 98.1 F | RESPIRATION RATE: 21 BRPM | SYSTOLIC BLOOD PRESSURE: 139 MMHG | HEART RATE: 78 BPM | WEIGHT: 293 LBS

## 2021-06-18 NOTE — ED PROVIDER NOTES
I was notified by nursing staff that patient wants to sign out [address] Had conversation with her about the clinical concern including her presentation of chest pain with elevated troponin. I did repeat a troponin and it did remain stable she is telling me that her chest pain has completely resolved. Secondarily on her imaging there was noted to have small dilation of small bowel. She reports the constipation last several days, she is refusing CT imaging for further evaluation of bowel obstruction. She is not vomiting appears comfortable abdomen soft. I informed her that bowel obstructions could be a surgical emergency which could lead to perforation of her intestine which could lead to severe infection death and permanent disability. She was also informed that by not receiving full cardiac evaluation she could have return of chest pain symptoms, could lead to further heart injury and further morbidity. Patient appears to have decisional making. At this time patient is choosing to leave [address] Will discharge back to facility.      Emilee Dennison,   06/17/21 2037

## 2021-06-18 NOTE — ED NOTES
Called [facility] at [phone] and [phone] to speak to nurse about transporting patient back to facility. Was transferred to [facility] but no one answered. Patient is refusing treatment and transfer to [facility].       William Puckett RN  06/17/21 2118

## 2021-06-18 NOTE — ED NOTES
Spoke with Annabelle Diaz at [facility] who states that she was fine with us sending patient back.       Dawson Hinojosa RN  06/17/21 9533

## 2022-01-01 NOTE — PROGRESS NOTES
"Pharmacy Consult - Warfarin    Joy Bueno is a 69 y.o. female receiving warfarin therapy.    Height - 162.6 cm (64\")  Weight - (!) 138 kg (305 lb 1.6 oz)    Consulting Provider: Dr. Bradshaw  Indication: recent LUE DVT  Goal INR: 2-3  Home Regimen:   -Warfarin 6.5mg daily (per [facility] MAR)    Bridge Therapy: No    Drug-Drug Interactions with current regimen:  None noted    Warfarin Dosing During Admission:    Date  7/31 8/1          INR  3.8 2.42          Dose  HOLD (6.5mg)             Education Provided: Patient is on warfarin prior to admission.  Education provided 8/1 verbally and in writing. Discussed effects of warfarin, importance of checking INR, drug-drug and drug-food interactions, and signs/symptoms of bleeding and clotting.    Discharge Follow up:    Following Provider - Pt gets INR checked at [facility]   Follow up time range or appointment - Will follow up INR with NH after discharge    Labs:    Results from last 7 days   Lab Units 08/01/19  0502 07/31/19  1131 07/31/19  1130 07/30/19  0651 07/30/19  0650   INR  2.42*  --  3.8*  --  3.98*   HEMOGLOBIN g/dL 9.3*  --   --  10.6*  --    HEMOGLOBIN, POC g/dL  --  11.6*  --   --   --    HEMATOCRIT % 32.5*  --   --  35.0  --    HEMATOCRIT POC %  --  34*  --   --   --    PLATELETS 10*3/mm3 265  --   --  292  --      Results from last 7 days   Lab Units 08/01/19  0502 07/31/19  1131   SODIUM mmol/L 139  --    POTASSIUM mmol/L 5.0  --    CHLORIDE mmol/L 102  --    CO2 mmol/L 24.0  --    BUN mg/dL 54*  --    CREATININE mg/dL 3.92* 6.10*   CALCIUM mg/dL 8.8  --    BILIRUBIN mg/dL 0.5  --    ALK PHOS U/L 81  --    ALT (SGPT) U/L 5  --    AST (SGOT) U/L 8  --    GLUCOSE mg/dL 315*  --      Current dietary intake: No diet documented  Diet Order   Procedures   • Diet Regular; Cardiac, Consistent Carbohydrate, Renal     Assessment/Plan:   1. Pt is taking Warfarin for treatment of recent LUE DVT. Goal INR is 2-3.   2. INR is therapeutic today at 2.42. Will start " patient on home Warfarin 6.5mg tonight. Pt will be discharged will follow INR with [facility].  3. Pt will have INR checked at NH in the next couple of days.  4. Pharmacy will continue to follow closely and adjust warfarin as needed based on drug interactions, daily INR trend, and clinical status.    Thanks  Jose Steiner, PharmD  PGY1 Resident  8/1/2019  12:59 PM     Assessment:  12 day old M ex 33.3 weeker, CGA 34.6 weeks admitted to the NICU for management of RDS, now resolved; currently with feeding issues. Patient tolerating RA without any signs of respiratory distress. Infant is taking PO feeds and the rest via NG tube feedings, tolerating feeds well since this morning. Will monitor for signs of emesis or intolerance to oral feedings. Will continue to monitor closely and start process of discharge planning.       PLAN  RESP  - RA    CVS  - Hemodynamically stable  - Continuous cardiac monitoring    FEN  - Continue feeds of 52cc q3h via PO/NG (TF 160cc/kg/day)    HEME  - s/p phototherapy   - Serum bili 7 on DOL 12    ID  - Stable  - Monitor for temperature instability    NEURO  - No changes    Discharge Planning:  - Passed CCHD  - Passed Hearing  - [place] screen done (, )  - Hep B given  - Needs CPR class (scheduled )  - Needs Circumcision (consent given)  - Car seat challenge passed  - Meds sent: polyvisol and iron

## 2022-01-10 ENCOUNTER — APPOINTMENT (OUTPATIENT)
Dept: GENERAL RADIOLOGY | Age: 72
End: 2022-01-10
Payer: COMMERCIAL

## 2022-01-10 ENCOUNTER — HOSPITAL ENCOUNTER (EMERGENCY)
Age: 72
Discharge: NURSING, REHAB OR LONG-TERM CARE | End: 2022-01-11
Attending: EMERGENCY MEDICINE
Payer: COMMERCIAL

## 2022-01-10 DIAGNOSIS — R05.9 COUGH: Primary | ICD-10-CM

## 2022-01-10 PROCEDURE — 71045 X-RAY EXAM CHEST 1 VIEW: CPT

## 2022-01-10 PROCEDURE — 82803 BLOOD GASES ANY COMBINATION: CPT

## 2022-01-10 PROCEDURE — 2700000000 HC OXYGEN THERAPY PER DAY

## 2022-01-10 PROCEDURE — 94761 N-INVAS EAR/PLS OXIMETRY MLT: CPT

## 2022-01-10 PROCEDURE — 99285 EMERGENCY DEPT VISIT HI MDM: CPT

## 2022-01-10 PROCEDURE — 93005 ELECTROCARDIOGRAM TRACING: CPT | Performed by: EMERGENCY MEDICINE

## 2022-01-10 PROCEDURE — 85025 COMPLETE CBC W/AUTO DIFF WBC: CPT

## 2022-01-10 PROCEDURE — 85610 PROTHROMBIN TIME: CPT

## 2022-01-10 PROCEDURE — 85379 FIBRIN DEGRADATION QUANT: CPT

## 2022-01-10 PROCEDURE — 85730 THROMBOPLASTIN TIME PARTIAL: CPT

## 2022-01-10 RX ORDER — OMEPRAZOLE 20 MG/1
20 CAPSULE, DELAYED RELEASE ORAL DAILY
COMMUNITY

## 2022-01-10 RX ORDER — FUROSEMIDE 20 MG/1
20 TABLET ORAL
COMMUNITY
End: 2022-09-19

## 2022-01-10 RX ORDER — DIPHENHYDRAMINE HCL 25 MG
25 CAPSULE ORAL EVERY 8 HOURS PRN
COMMUNITY

## 2022-01-10 RX ORDER — ONDANSETRON 4 MG/1
4 TABLET, FILM COATED ORAL EVERY 8 HOURS PRN
Status: ON HOLD | COMMUNITY
End: 2022-10-28 | Stop reason: HOSPADM

## 2022-01-10 RX ORDER — TERAZOSIN 1 MG/1
1 CAPSULE ORAL NIGHTLY
Status: ON HOLD | COMMUNITY
End: 2022-10-28 | Stop reason: HOSPADM

## 2022-01-10 RX ORDER — LOPERAMIDE HYDROCHLORIDE 2 MG/1
2 CAPSULE ORAL 4 TIMES DAILY PRN
Status: ON HOLD | COMMUNITY
End: 2022-05-21 | Stop reason: HOSPADM

## 2022-01-10 RX ORDER — INSULIN GLARGINE 100 [IU]/ML
INJECTION, SOLUTION SUBCUTANEOUS NIGHTLY
Status: ON HOLD | COMMUNITY
End: 2022-02-17

## 2022-01-10 RX ORDER — CHLORHEXIDINE GLUCONATE 0.12 MG/ML
15 RINSE ORAL 2 TIMES DAILY
COMMUNITY

## 2022-01-10 RX ORDER — IPRATROPIUM BROMIDE AND ALBUTEROL SULFATE 2.5; .5 MG/3ML; MG/3ML
1 SOLUTION RESPIRATORY (INHALATION) EVERY 4 HOURS
COMMUNITY

## 2022-01-10 RX ORDER — LACTULOSE 10 G/15ML
20 SOLUTION ORAL; RECTAL DAILY PRN
Status: ON HOLD | COMMUNITY
End: 2022-10-28 | Stop reason: HOSPADM

## 2022-01-10 RX ORDER — POLYETHYLENE GLYCOL 3350 17 G/17G
17 POWDER, FOR SOLUTION ORAL DAILY PRN
COMMUNITY

## 2022-01-10 RX ORDER — SENNA AND DOCUSATE SODIUM 50; 8.6 MG/1; MG/1
2 TABLET, FILM COATED ORAL NIGHTLY
Status: ON HOLD | COMMUNITY
End: 2022-10-28 | Stop reason: HOSPADM

## 2022-01-10 RX ORDER — HYDROCORTISONE ACETATE 25 MG/1
25 SUPPOSITORY RECTAL 2 TIMES DAILY
COMMUNITY
End: 2022-03-03

## 2022-01-10 RX ORDER — MIDODRINE HYDROCHLORIDE 5 MG/1
10 TABLET ORAL
COMMUNITY

## 2022-01-10 RX ORDER — HYDROCODONE BITARTRATE AND ACETAMINOPHEN 5; 325 MG/1; MG/1
1 TABLET ORAL EVERY 12 HOURS PRN
Status: ON HOLD | COMMUNITY
End: 2022-02-21 | Stop reason: SDUPTHER

## 2022-01-10 RX ORDER — CEPHALEXIN 500 MG/1
500 CAPSULE ORAL 4 TIMES DAILY
Status: ON HOLD | COMMUNITY
End: 2022-02-17

## 2022-01-10 RX ORDER — LANOLIN ALCOHOL/MO/W.PET/CERES
3 CREAM (GRAM) TOPICAL DAILY
Status: ON HOLD | COMMUNITY
End: 2022-02-17

## 2022-01-10 RX ORDER — DOCUSATE SODIUM 100 MG/1
100 CAPSULE, LIQUID FILLED ORAL 2 TIMES DAILY
Status: ON HOLD | COMMUNITY
End: 2022-10-28 | Stop reason: HOSPADM

## 2022-01-10 RX ORDER — CALCIUM ACETATE 667 MG/1
667 CAPSULE ORAL
Status: ON HOLD | COMMUNITY
Start: 2021-10-10 | End: 2022-02-17

## 2022-01-10 ASSESSMENT — PULMONARY FUNCTION TESTS: PIF_VALUE: 28

## 2022-01-11 VITALS
OXYGEN SATURATION: 100 % | SYSTOLIC BLOOD PRESSURE: 147 MMHG | TEMPERATURE: 98.3 F | RESPIRATION RATE: 18 BRPM | HEART RATE: 84 BPM | DIASTOLIC BLOOD PRESSURE: 61 MMHG

## 2022-01-11 LAB
APTT: 44.1 SEC (ref 26.2–38.6)
BASE EXCESS VENOUS: 0.2 MMOL/L (ref -3–3)
BASOPHILS ABSOLUTE: 0.1 K/UL (ref 0–0.2)
BASOPHILS RELATIVE PERCENT: 0.7 %
CARBOXYHEMOGLOBIN: 9.1 % (ref 0–1.5)
D DIMER: 235 NG/ML DDU (ref 0–229)
EKG ATRIAL RATE: 81 BPM
EKG DIAGNOSIS: NORMAL
EKG P AXIS: 67 DEGREES
EKG P-R INTERVAL: 222 MS
EKG Q-T INTERVAL: 380 MS
EKG QRS DURATION: 76 MS
EKG QTC CALCULATION (BAZETT): 441 MS
EKG R AXIS: 7 DEGREES
EKG T AXIS: 40 DEGREES
EKG VENTRICULAR RATE: 81 BPM
EOSINOPHILS ABSOLUTE: 0.6 K/UL (ref 0–0.6)
EOSINOPHILS RELATIVE PERCENT: 5.8 %
GLUCOSE BLD-MCNC: 97 MG/DL (ref 70–99)
HCO3 VENOUS: 23.7 MMOL/L (ref 23–29)
HCT VFR BLD CALC: 28.8 % (ref 36–48)
HEMOGLOBIN: 9.1 G/DL (ref 12–16)
INFLUENZA A: NOT DETECTED
INFLUENZA B: NOT DETECTED
INR BLD: 2.06 (ref 0.88–1.12)
LYMPHOCYTES ABSOLUTE: 0.7 K/UL (ref 1–5.1)
LYMPHOCYTES RELATIVE PERCENT: 6.8 %
MCH RBC QN AUTO: 30.6 PG (ref 26–34)
MCHC RBC AUTO-ENTMCNC: 31.5 G/DL (ref 31–36)
MCV RBC AUTO: 97.2 FL (ref 80–100)
METHEMOGLOBIN VENOUS: 0.3 %
MONOCYTES ABSOLUTE: 0.4 K/UL (ref 0–1.3)
MONOCYTES RELATIVE PERCENT: 4.3 %
NEUTROPHILS ABSOLUTE: 8.5 K/UL (ref 1.7–7.7)
NEUTROPHILS RELATIVE PERCENT: 82.4 %
O2 SAT, VEN: 99 %
O2 THERAPY: ABNORMAL
PCO2, VEN: 33.8 MMHG (ref 40–50)
PDW BLD-RTO: 18.2 % (ref 12.4–15.4)
PERFORMED ON: NORMAL
PH VENOUS: 7.46 (ref 7.35–7.45)
PLATELET # BLD: 342 K/UL (ref 135–450)
PMV BLD AUTO: 7.5 FL (ref 5–10.5)
PO2, VEN: 168.3 MMHG (ref 25–40)
PROTHROMBIN TIME: 24 SEC (ref 9.9–12.7)
RBC # BLD: 2.96 M/UL (ref 4–5.2)
SARS-COV-2 RNA, RT PCR: NOT DETECTED
TCO2 CALC VENOUS: 25 MMOL/L
WBC # BLD: 10.3 K/UL (ref 4–11)

## 2022-01-11 PROCEDURE — 94761 N-INVAS EAR/PLS OXIMETRY MLT: CPT

## 2022-01-11 PROCEDURE — 2700000000 HC OXYGEN THERAPY PER DAY

## 2022-01-11 PROCEDURE — 6370000000 HC RX 637 (ALT 250 FOR IP): Performed by: EMERGENCY MEDICINE

## 2022-01-11 PROCEDURE — 93010 ELECTROCARDIOGRAM REPORT: CPT | Performed by: INTERNAL MEDICINE

## 2022-01-11 PROCEDURE — 87636 SARSCOV2 & INF A&B AMP PRB: CPT

## 2022-01-11 RX ORDER — LORAZEPAM 1 MG/1
1 TABLET ORAL PRN
Status: DISCONTINUED | OUTPATIENT
Start: 2022-01-11 | End: 2022-01-11 | Stop reason: HOSPADM

## 2022-01-11 RX ADMIN — LORAZEPAM 1 MG: 1 TABLET ORAL at 02:22

## 2022-01-11 ASSESSMENT — PULMONARY FUNCTION TESTS
PIF_VALUE: 32
PIF_VALUE: 29
PIF_VALUE: 32

## 2022-01-11 NOTE — PROGRESS NOTES
01/11/22 0746   Vent Information   Vent Type 840   Vent Mode AC/VC   Vt Ordered 500 mL   Rate Set 14 bmp   Peak Flow 60 L/min   FiO2  35 %   SpO2 100 %   SpO2/FiO2 ratio 285.71   Sensitivity 3   PEEP/CPAP 5   Humidification Source HME   Vent Patient Data   Peak Inspiratory Pressure 32 cmH2O   Mean Airway Pressure 11 cmH20   Rate Measured 17 br/min   Vt Exhaled 584 mL   Minute Volume 7.61 Liters   I:E Ratio 1:3.7   Cough/Sputum   Sputum How Obtained Tracheal;Suctioned   Cough Productive   Sputum Amount Moderate   Sputum Color Creamy   Tenacity Thick   Spontaneous Breathing Trial (SBT) RT Doc   Pulse 79   Breath Sounds   Right Upper Lobe Diminished   Right Middle Lobe Diminished   Right Lower Lobe Diminished   Left Upper Lobe Diminished   Left Lower Lobe Diminished   Additional Respiratory  Assessments   Resp 15   Position Semi-Ríos's   Alarm Settings   High Pressure Alarm 45 cmH2O   Low Minute Volume Alarm 3 L/min   Apnea (secs) 20 secs   High Respiratory Rate 45 br/min   Low Exhaled Vt  250 mL   Patient Observation   Observations #6 JULIAN XLT DISTAL. AMBU BAG AT HEAD OF BED.

## 2022-01-11 NOTE — ED NOTES
This nurse and DR eric spoke with patient and imformed her that we needed to redraw some labs patient stated she refuses and doesn't want and lab work done     [facility]	[place], RN  01/11/22 7410

## 2022-01-11 NOTE — PROGRESS NOTES
01/11/22 0313   Vent Information   Vent Type 840   Vent Mode AC/VC   Vt Ordered 500 mL   Rate Set 14 bmp   Peak Flow 60 L/min   FiO2  35 %   SpO2 100 %   SpO2/FiO2 ratio 285.71   Sensitivity 3   PEEP/CPAP 5   Humidification Source HME   Vent Patient Data   Peak Inspiratory Pressure 32 cmH2O   Mean Airway Pressure 14 cmH20   Rate Measured 17 br/min   Vt Exhaled 517 mL   Minute Volume 9.02 Liters   I:E Ratio 1:3.2   Plateau Pressure 27 CLX43   Cough/Sputum   Sputum How Obtained Tracheal;Suctioned   Cough Productive   Sputum Amount Moderate   Sputum Color Creamy   Tenacity Thick   Spontaneous Breathing Trial (SBT) RT Doc   Pulse 87   Breath Sounds   Right Upper Lobe Diminished   Right Middle Lobe Diminished   Right Lower Lobe Diminished   Left Upper Lobe Diminished   Left Lower Lobe Diminished   Additional Respiratory  Assessments   Resp 16   Position Semi-Ríos's   Alarm Settings   High Pressure Alarm 45 cmH2O   Low Minute Volume Alarm 3 L/min   Apnea (secs) 20 secs   High Respiratory Rate 45 br/min   Low Exhaled Vt  250 mL   Patient Observation   Observations #6 JULIAN XLT DISTAL. AMBU BAG AT HEAD OF BED.

## 2022-01-11 NOTE — ED NOTES
[facility] had called and said they have basic unit available so asked if they could take this pt back next door. Said didn't have vent and I asked if could bag pt back. Dispatcher called back and said they can take pt back for us.    Dispatcher put us in for a 0900      Satish Marcus  01/11/22 8203    [facility] here to transport back to [address] [place] [address]  01/11/22 0900

## 2022-01-11 NOTE — ED PROVIDER NOTES
[address] ED      CHIEF COMPLAINT  Shortness of Breath       HISTORY OF PRESENT ILLNESS  Giuliano Calderon is a 80 y.o. female with history of acute on chronic respiratory failure on trach and vent tube presents to the emergency department for evaluation of shortness of breath. Patient reports she was coughing and the tubing became loose, but the nursing staff thought she took it off and sent her to the emergency department for evaluation. Reports she has been coughing more than usual over the past few days. She has her usual shortness of breath. Does report having chest discomfort with coughing. Denies having any other complaints. No other complaints, modifying factors or associated symptoms. I have reviewed the following from the nursing documentation. Past Medical History:   Diagnosis Date    Acute and chronic respiratory failure (acute-on-chronic) ([facility])     Anxiety disorder     Atherosclerotic heart disease of native coronary artery without angina pectoris     Cerebral infarction ([facility])     Chronic pain syndrome     Dependence on renal dialysis ([address]     Dependence on respirator ([facility])     End stage renal disease ([facility])     Gastro-esophageal reflux disease with esophagitis, without bleeding     Insomnia     Major depressive disorder, recurrent ([address]     Morbid obesity due to excess calories ([facility])     Muscle weakness     Obstructive sleep apnea (adult) (pediatric)     Other hyperlipidemia     Other voice and resonance disorders     Personal history of COVID-19     Pulmonary hypertension ([address]     Tracheostomy status ([address]     Type 2 diabetes mellitus without complications ([address]     Unspecified atrial fibrillation ([address]      History reviewed. No pertinent surgical history. History reviewed. No pertinent family history.   Social History     Socioeconomic History    Marital status:      Spouse name: Not on file    Number of children: Not on file    Years of education: Not on file    Highest education level: Not on file   Occupational History    Not on file   Tobacco Use    Smoking status: Former Smoker    Smokeless tobacco: Never Used   Substance and Sexual Activity    Alcohol use: Never    Drug use: Never    Sexual activity: Not on file   Other Topics Concern    Not on file   Social History Narrative    Not on file     Social Determinants of Health     Financial Resource Strain:     Difficulty of Paying Living Expenses: Not on file   Food Insecurity:     Worried About Running Out of Food in the Last Year: Not on file    Volodymyr of Food in the Last Year: Not on file   Transportation Needs:     Lack of Transportation (Medical): Not on file    Lack of Transportation (Non-Medical):  Not on file   Physical Activity:     Days of Exercise per Week: Not on file    Minutes of Exercise per Session: Not on file   Stress:     Feeling of Stress : Not on file   Social Connections:     Frequency of Communication with Friends and Family: Not on file    Frequency of Social Gatherings with Friends and Family: Not on file    Attends Druze Services: Not on file    Active Member of [address] [place] [address] or Organizations: Not on file    Attends Club or Organization Meetings: Not on file    Marital Status: Not on file   Intimate Partner Violence:     Fear of Current or Ex-Partner: Not on file    Emotionally Abused: Not on file    Physically Abused: Not on file    Sexually Abused: Not on file   Housing Stability:     Unable to Pay for Housing in the Last Year: Not on file    Number of [place] in the Last Year: Not on file    Unstable Housing in the Last Year: Not on file     Current Facility-Administered Medications   Medication Dose Route Frequency Provider Last Rate Last Admin    LORazepam (ATIVAN) tablet 1 mg  1 mg Oral PRN Sheryle Jasper, MD   1 mg at 01/11/22 0222     Current Outpatient Medications   Medication Sig Dispense Refill    calcium acetate (PHOSLO) [phone] Vomiting       No Known Allergies    REVIEW OF SYSTEMS  10 systems reviewed, pertinent positives per HPI otherwise noted to be negative. PHYSICAL EXAM  /82   Pulse 87   Temp 98.3 °F (36.8 °C) (Oral)   Resp 16   SpO2 100%    GENERAL APPEARANCE: Trach dependent. Clinically non toxic appearing  HENT: Normocephalic. Atraumatic. PERRL. EOMI. No facial droop. HEART/CHEST: RRR. LUNGS: Trach on home vent setting maintaining sat >90. ABDOMEN: Soft, non-distended abdomen. Non tender to palpation. No guarding. No rebound. EXTREMITIES: No gross deformities. Moving all extremities. SKIN: Warm and dry. No acute rashes. NEUROLOGICAL: Alert and oriented. No gross facial drooping. Answering questions appropriately. Moving all extremities. PSYCHIATRIC: Pleasant. Normal mood and affect.     LABS  Results for orders placed or performed during the hospital encounter of 01/10/22   COVID-19 & Influenza Combo    Specimen: Nasopharyngeal Swab   Result Value Ref Range    SARS-CoV-2 RNA, RT PCR NOT DETECTED NOT DETECTED    INFLUENZA A NOT DETECTED NOT DETECTED    INFLUENZA B NOT DETECTED NOT DETECTED   CBC Auto Differential   Result Value Ref Range    WBC 10.3 4.0 - 11.0 K/uL    RBC 2.96 (L) 4.00 - 5.20 M/uL    Hemoglobin 9.1 (L) 12.0 - 16.0 g/dL    Hematocrit 28.8 (L) 36.0 - 48.0 %    MCV 97.2 80.0 - 100.0 fL    MCH 30.6 26.0 - 34.0 pg    MCHC 31.5 31.0 - 36.0 g/dL    RDW 18.2 (H) 12.4 - 15.4 %    Platelets 031 211 - 798 K/uL    MPV 7.5 5.0 - 10.5 fL    Neutrophils % 82.4 %    Lymphocytes % 6.8 %    Monocytes % 4.3 %    Eosinophils % 5.8 %    Basophils % 0.7 %    Neutrophils Absolute 8.5 (H) 1.7 - 7.7 K/uL    Lymphocytes Absolute 0.7 (L) 1.0 - 5.1 K/uL    Monocytes Absolute 0.4 0.0 - 1.3 K/uL    Eosinophils Absolute 0.6 0.0 - 0.6 K/uL    Basophils Absolute 0.1 0.0 - 0.2 K/uL   Blood Gas, Venous   Result Value Ref Range    pH, Vickey 7.463 (H) 7.350 - 7.450    pCO2, Vickey 33.8 (L) 40.0 - 50.0 mmHg    pO2, Vickey 168.3 (H) 25.0 - 40.0 mmHg    HCO3, Venous 23.7 23.0 - 29.0 mmol/L    Base Excess, Vickey 0.2 -3.0 - 3.0 mmol/L    O2 Sat, Vickey 99 Not Established %    Carboxyhemoglobin 9.1 (H) 0.0 - 1.5 %    MetHgb, Vickey 0.3 <1.5 %    TC02 (Calc), Vickey 25 Not Established mmol/L    O2 Therapy Unknown    D-Dimer, Quantitative   Result Value Ref Range    D-Dimer, Quant 235 (H) 0 - 229 ng/mL DDU   APTT   Result Value Ref Range    aPTT 44.1 (H) 26.2 - 38.6 sec   Protime-INR   Result Value Ref Range    Protime 24.0 (H) 9.9 - 12.7 sec    INR 2.06 (H) 0.88 - 1.12   POCT Glucose   Result Value Ref Range    POC Glucose 97 70 - 99 mg/dl    Performed on [email or web address]    EKG 12 Lead   Result Value Ref Range    Ventricular Rate 81 BPM    Atrial Rate 81 BPM    P-R Interval 222 ms    QRS Duration 76 ms    Q-T Interval 380 ms    QTc Calculation (Bazett) 441 ms    P Axis 67 degrees    R Axis 7 degrees    T Axis 40 degrees    Diagnosis       Sinus rhythm with 1st degree A-V blockLow voltage QRSCannot rule out Anterior infarct , age undeterminedAbnormal ECG       I have reviewed all labs for this visit. ECG  The Ekg interpreted by me shows  Sinus rhythm with first-degree AV block with a rate of 81  Axis is normal  QTc is normal  Low voltage QRS  ST Segments: Cannot rule out anterior infarct, age undetermined    RADIOLOGY  XR CHEST PORTABLE    Result Date: 1/10/2022  EXAMINATION: ONE XRAY VIEW OF THE CHEST 1/10/2022 10:26 pm COMPARISON: 06/17/2021 HISTORY: ORDERING SYSTEM PROVIDED HISTORY: cp TECHNOLOGIST PROVIDED HISTORY: Reason for exam:->cp Reason for Exam: cp FINDINGS: Tracheostomy tube and external leads are present. The cardiac silhouette is enlarged with mild vascular congestion. Scattered interstitial opacities are present in both lungs. No large effusion or pneumothorax are demonstrated on this single view. Underpenetration of the spine but overall the bony thorax appears stable. Cardiomegaly with mild pulmonary vascular congestion.   Scattered interstitial opacities could be from early edema but viral infection is not excluded. ED COURSE/MDM  Patient seen and evaluated. At presentation, patient was awake, alert, afebrile, hemodynamically stable, and satting well on her home vent setting. Respiratory present at bedside to place patient on the home vent setting. Differential diagnosis includes ACS, arrhythmia, PE, pneumonia, or others. Glucose is 97. Hemoglobin is 9.1, no significant change compared to baseline. VBG shows pH of 7.4, no hypercapnia, bicarb within normal limits at 23. D-dimer is 235, which is within normal limits when age-adjusted. INR is within therapeutic limits at 2.06.  COVID-19 swab was negative. Influenza A and B are both negative. Chest x-ray shows no focal consolidation. Some blood work including CMP was hemolyzed. This was discussed with patient who did not want repeat labs. She reports she did not want to live at [facility]. Discussed that we spoke with daughter who will talk to social work in the morning. Patient reports feeling fine and denies having any complaints. Patient remained stable during her stay in the ED. We will send her back to [facility]. I provided at least 10 minutes of critical care excluding separately billable procedures. Pt was seen during the [place] 19 pandemic. Appropriate PPE worn by ME during patient encounters. Pt seen during a time with constrained hospital bed capacity and other potential inpatient and outpatient resources were constrained due to the viral pandemic. During the patient's ED course, the patient was given:  Medications   LORazepam (ATIVAN) tablet 1 mg (1 mg Oral Given 1/11/22 0222)        CLINICAL IMPRESSION  1. Cough        Blood pressure 114/82, pulse 87, temperature 98.3 °F (36.8 °C), temperature source Oral, resp. rate 16, SpO2 100 %. DISPOSITION  Hipolito Greenwood was discharged to [facility] in stable condition. Patient was given scripts for the following medications.  I counseled patient how to take these medications. New Prescriptions    No medications on file       Follow-up with:  Astrid Solis MD  [address]  [phone]    Call in 2 days        DISCLAIMER: This chart was created using Dragon dictation software. Efforts were made by me to ensure accuracy, however some errors may be present due to limitations of this technology and occasionally words are not transcribed correctly.        Lucio Chavez MD  01/12/22 4339

## 2022-01-11 NOTE — PROGRESS NOTES
01/11/22 0009   Vent Information   Vent Type 840   Vent Mode AC/VC   Vt Ordered 500 mL   Rate Set 14 bmp   Peak Flow 60 L/min   FiO2  35 %   SpO2 100 %   SpO2/FiO2 ratio 285.71   Sensitivity 3   PEEP/CPAP 5   Humidification Source HME   Vent Patient Data   Peak Inspiratory Pressure 29 cmH2O   Mean Airway Pressure 16 cmH20   Rate Measured 31 br/min   Vt Exhaled 559 mL   Minute Volume 15.6 Liters   I:E Ratio 1;1.6   Plateau Pressure 28 [id number]   Cough/Sputum   Sputum How Obtained Tracheal;Suctioned   Cough Productive   Sputum Amount Small   Sputum Color Creamy   Tenacity Thick   Spontaneous Breathing Trial (SBT) RT Doc   Pulse 87   Breath Sounds   Right Upper Lobe Diminished   Right Middle Lobe Diminished   Right Lower Lobe Diminished   Left Upper Lobe Diminished   Left Lower Lobe Diminished   Additional Respiratory  Assessments   Resp 26   Position Semi-Ríos's   Alarm Settings   High Pressure Alarm 45 cmH2O   Low Minute Volume Alarm 3 L/min   Apnea (secs) 20 secs   High Respiratory Rate 45 br/min   Low Exhaled Vt  250 mL   Patient Observation   Observations #6 JULIAN XLT DISTAL . AMBU BAG AT HEAD OF BED.

## 2022-01-11 NOTE — ED NOTES
Pt discharged back to nursing home, transported by [employer]. Report given to [facility].       Holly Georges RN  01/11/22 6474

## 2022-01-11 NOTE — ED TRIAGE NOTES
Patient arrived to ED via medic from [address] [facility] medic reports staff at [facility] center states patient was purposely dislodging trach patient requested to be sent to hospital. Patient is A&Ox4 skin is warm and dry respirations are e/u with NAD

## 2022-01-11 NOTE — PROGRESS NOTES
01/10/22 2151   Vent Information   $Ventilation $Initial Day   Vent Type 840   Vent Mode AC/VC   Vt Ordered 500 mL   Rate Set 14 bmp   Peak Flow 60 L/min   FiO2  40 %   SpO2 98 %   SpO2/FiO2 ratio 245   Sensitivity 3   PEEP/CPAP 5   Humidification Source HME   Vent Patient Data   Peak Inspiratory Pressure 28 cmH2O   Mean Airway Pressure 12 cmH20   Rate Measured 28 br/min   Vt Exhaled 508 mL   Minute Volume 14.1 Liters   I:E Ratio 1:2.0   Plateau Pressure 26 CPK04   Static Compliance 24 mL/cmH2O   Dynamic Compliance 22 mL/cmH2O   Cough/Sputum   Sputum How Obtained Tracheal;Suctioned   Cough Non-productive   Breath Sounds   Right Upper Lobe Diminished   Right Middle Lobe Diminished   Right Lower Lobe Diminished   Left Upper Lobe Diminished   Left Lower Lobe Diminished   Additional Respiratory  Assessments   Position Semi-Ríos's   Alarm Settings   High Pressure Alarm 45 cmH2O   Low Minute Volume Alarm 3 L/min   Apnea (secs) 20 secs   High Respiratory Rate 40 br/min   Low Exhaled Vt  250 mL   Patient Observation   Observations #6 dawna xlt distal.  ambu bag at head of bed.

## 2022-02-17 ENCOUNTER — APPOINTMENT (OUTPATIENT)
Dept: GENERAL RADIOLOGY | Age: 72
DRG: 870 | End: 2022-02-17
Payer: COMMERCIAL

## 2022-02-17 ENCOUNTER — HOSPITAL ENCOUNTER (INPATIENT)
Age: 72
LOS: 4 days | Discharge: NURSING, REHAB OR LONG-TERM CARE | DRG: 870 | End: 2022-02-21
Attending: STUDENT IN AN ORGANIZED HEALTH CARE EDUCATION/TRAINING PROGRAM | Admitting: INTERNAL MEDICINE
Payer: COMMERCIAL

## 2022-02-17 DIAGNOSIS — J96.21 ACUTE ON CHRONIC RESPIRATORY FAILURE WITH HYPOXIA (HCC): Primary | ICD-10-CM

## 2022-02-17 DIAGNOSIS — J81.0 ACUTE PULMONARY EDEMA (HCC): ICD-10-CM

## 2022-02-17 DIAGNOSIS — N18.6 ESRD ON DIALYSIS (HCC): ICD-10-CM

## 2022-02-17 DIAGNOSIS — Z99.2 ESRD ON DIALYSIS (HCC): ICD-10-CM

## 2022-02-17 DIAGNOSIS — J18.9 PNEUMONIA DUE TO INFECTIOUS ORGANISM, UNSPECIFIED LATERALITY, UNSPECIFIED PART OF LUNG: ICD-10-CM

## 2022-02-17 DIAGNOSIS — F41.9 ANXIETY: ICD-10-CM

## 2022-02-17 DIAGNOSIS — K62.89 RECTAL PAIN: ICD-10-CM

## 2022-02-17 LAB
A/G RATIO: 0.7 (ref 1.1–2.2)
ALBUMIN SERPL-MCNC: 3.5 G/DL (ref 3.4–5)
ALP BLD-CCNC: 117 U/L (ref 40–129)
ALT SERPL-CCNC: 8 U/L (ref 10–40)
ANION GAP SERPL CALCULATED.3IONS-SCNC: 17 MMOL/L (ref 3–16)
AST SERPL-CCNC: 12 U/L (ref 15–37)
BASE EXCESS ARTERIAL: 0.9 MMOL/L (ref -3–3)
BASE EXCESS VENOUS: -0.3 MMOL/L (ref -3–3)
BASOPHILS ABSOLUTE: 0 K/UL (ref 0–0.2)
BASOPHILS RELATIVE PERCENT: 0.3 %
BILIRUB SERPL-MCNC: 0.4 MG/DL (ref 0–1)
BUN BLDV-MCNC: 70 MG/DL (ref 7–20)
CALCIUM SERPL-MCNC: 8.9 MG/DL (ref 8.3–10.6)
CARBOXYHEMOGLOBIN ARTERIAL: 0.6 % (ref 0–1.5)
CARBOXYHEMOGLOBIN: 3.7 % (ref 0–1.5)
CHLORIDE BLD-SCNC: 95 MMOL/L (ref 99–110)
CO2: 25 MMOL/L (ref 21–32)
CREAT SERPL-MCNC: 6.8 MG/DL (ref 0.6–1.2)
EKG ATRIAL RATE: 117 BPM
EKG DIAGNOSIS: NORMAL
EKG P AXIS: -55 DEGREES
EKG P-R INTERVAL: 184 MS
EKG Q-T INTERVAL: 356 MS
EKG QRS DURATION: 70 MS
EKG QTC CALCULATION (BAZETT): 496 MS
EKG R AXIS: -9 DEGREES
EKG T AXIS: 73 DEGREES
EKG VENTRICULAR RATE: 117 BPM
EOSINOPHILS ABSOLUTE: 0.3 K/UL (ref 0–0.6)
EOSINOPHILS RELATIVE PERCENT: 1.7 %
GFR AFRICAN AMERICAN: 7
GFR NON-AFRICAN AMERICAN: 6
GLUCOSE BLD-MCNC: 125 MG/DL (ref 70–99)
GLUCOSE BLD-MCNC: 147 MG/DL (ref 70–99)
GLUCOSE BLD-MCNC: 166 MG/DL (ref 70–99)
GLUCOSE BLD-MCNC: 186 MG/DL (ref 70–99)
HCO3 ARTERIAL: 26.3 MMOL/L (ref 21–29)
HCO3 VENOUS: 27 MMOL/L (ref 23–29)
HCT VFR BLD CALC: 31.8 % (ref 36–48)
HEMOGLOBIN, ART, EXTENDED: 10.1 G/DL (ref 12–16)
HEMOGLOBIN: 9.7 G/DL (ref 12–16)
INFLUENZA A: NOT DETECTED
INFLUENZA B: NOT DETECTED
INR BLD: 3.78 (ref 0.88–1.12)
LACTIC ACID, SEPSIS: 1.3 MMOL/L (ref 0.4–1.9)
LYMPHOCYTES ABSOLUTE: 1.2 K/UL (ref 1–5.1)
LYMPHOCYTES RELATIVE PERCENT: 7.7 %
MCH RBC QN AUTO: 29 PG (ref 26–34)
MCHC RBC AUTO-ENTMCNC: 30.5 G/DL (ref 31–36)
MCV RBC AUTO: 95 FL (ref 80–100)
METHEMOGLOBIN ARTERIAL: 0.3 %
METHEMOGLOBIN VENOUS: 0.3 %
MONOCYTES ABSOLUTE: 0.5 K/UL (ref 0–1.3)
MONOCYTES RELATIVE PERCENT: 3.1 %
NEUTROPHILS ABSOLUTE: 13.2 K/UL (ref 1.7–7.7)
NEUTROPHILS RELATIVE PERCENT: 87.2 %
O2 CONTENT, VEN: 14 VOL %
O2 SAT, ARTERIAL: 98.7 %
O2 SAT, VEN: 96 %
O2 THERAPY: ABNORMAL
O2 THERAPY: ABNORMAL
PCO2 ARTERIAL: 45.2 MMHG (ref 35–45)
PCO2, VEN: 57.8 MMHG (ref 40–50)
PDW BLD-RTO: 19.1 % (ref 12.4–15.4)
PERFORMED ON: ABNORMAL
PH ARTERIAL: 7.38 (ref 7.35–7.45)
PH VENOUS: 7.29 (ref 7.35–7.45)
PLATELET # BLD: 291 K/UL (ref 135–450)
PMV BLD AUTO: 7.2 FL (ref 5–10.5)
PO2 ARTERIAL: 136.4 MMHG (ref 75–108)
PO2, VEN: 90.4 MMHG (ref 25–40)
POTASSIUM REFLEX MAGNESIUM: 4.7 MMOL/L (ref 3.5–5.1)
PRO-BNP: ABNORMAL PG/ML (ref 0–124)
PROCALCITONIN: 0.96 NG/ML (ref 0–0.15)
PROTHROMBIN TIME: 45.2 SEC (ref 9.9–12.7)
RBC # BLD: 3.34 M/UL (ref 4–5.2)
SARS-COV-2 RNA, RT PCR: NOT DETECTED
SODIUM BLD-SCNC: 137 MMOL/L (ref 136–145)
TCO2 ARTERIAL: 27.6 MMOL/L
TCO2 CALC VENOUS: 29 MMOL/L
TOTAL PROTEIN: 8.4 G/DL (ref 6.4–8.2)
TROPONIN: 0.2 NG/ML
TROPONIN: 0.37 NG/ML
TROPONIN: 0.39 NG/ML
WBC # BLD: 15.1 K/UL (ref 4–11)

## 2022-02-17 PROCEDURE — 87636 SARSCOV2 & INF A&B AMP PRB: CPT

## 2022-02-17 PROCEDURE — 36415 COLL VENOUS BLD VENIPUNCTURE: CPT

## 2022-02-17 PROCEDURE — 6360000002 HC RX W HCPCS: Performed by: NURSE PRACTITIONER

## 2022-02-17 PROCEDURE — 87186 SC STD MICRODIL/AGAR DIL: CPT

## 2022-02-17 PROCEDURE — 99285 EMERGENCY DEPT VISIT HI MDM: CPT

## 2022-02-17 PROCEDURE — 0BH17EZ INSERTION OF ENDOTRACHEAL AIRWAY INTO TRACHEA, VIA NATURAL OR ARTIFICIAL OPENING: ICD-10-PCS | Performed by: INTERNAL MEDICINE

## 2022-02-17 PROCEDURE — 5A1955Z RESPIRATORY VENTILATION, GREATER THAN 96 CONSECUTIVE HOURS: ICD-10-PCS | Performed by: INTERNAL MEDICINE

## 2022-02-17 PROCEDURE — 99223 1ST HOSP IP/OBS HIGH 75: CPT | Performed by: INTERNAL MEDICINE

## 2022-02-17 PROCEDURE — 6370000000 HC RX 637 (ALT 250 FOR IP): Performed by: NURSE PRACTITIONER

## 2022-02-17 PROCEDURE — 87070 CULTURE OTHR SPECIMN AEROBIC: CPT

## 2022-02-17 PROCEDURE — 96365 THER/PROPH/DIAG IV INF INIT: CPT

## 2022-02-17 PROCEDURE — 2580000003 HC RX 258: Performed by: STUDENT IN AN ORGANIZED HEALTH CARE EDUCATION/TRAINING PROGRAM

## 2022-02-17 PROCEDURE — 93005 ELECTROCARDIOGRAM TRACING: CPT | Performed by: STUDENT IN AN ORGANIZED HEALTH CARE EDUCATION/TRAINING PROGRAM

## 2022-02-17 PROCEDURE — 36600 WITHDRAWAL OF ARTERIAL BLOOD: CPT

## 2022-02-17 PROCEDURE — 6370000000 HC RX 637 (ALT 250 FOR IP): Performed by: INTERNAL MEDICINE

## 2022-02-17 PROCEDURE — 85610 PROTHROMBIN TIME: CPT

## 2022-02-17 PROCEDURE — 6360000002 HC RX W HCPCS: Performed by: STUDENT IN AN ORGANIZED HEALTH CARE EDUCATION/TRAINING PROGRAM

## 2022-02-17 PROCEDURE — 80053 COMPREHEN METABOLIC PANEL: CPT

## 2022-02-17 PROCEDURE — 2060000000 HC ICU INTERMEDIATE R&B

## 2022-02-17 PROCEDURE — 87040 BLOOD CULTURE FOR BACTERIA: CPT

## 2022-02-17 PROCEDURE — 6370000000 HC RX 637 (ALT 250 FOR IP): Performed by: STUDENT IN AN ORGANIZED HEALTH CARE EDUCATION/TRAINING PROGRAM

## 2022-02-17 PROCEDURE — 83605 ASSAY OF LACTIC ACID: CPT

## 2022-02-17 PROCEDURE — 83880 ASSAY OF NATRIURETIC PEPTIDE: CPT

## 2022-02-17 PROCEDURE — 94002 VENT MGMT INPAT INIT DAY: CPT

## 2022-02-17 PROCEDURE — 96366 THER/PROPH/DIAG IV INF ADDON: CPT

## 2022-02-17 PROCEDURE — 85025 COMPLETE CBC W/AUTO DIFF WBC: CPT

## 2022-02-17 PROCEDURE — 87205 SMEAR GRAM STAIN: CPT

## 2022-02-17 PROCEDURE — 71045 X-RAY EXAM CHEST 1 VIEW: CPT

## 2022-02-17 PROCEDURE — 82803 BLOOD GASES ANY COMBINATION: CPT

## 2022-02-17 PROCEDURE — 87077 CULTURE AEROBIC IDENTIFY: CPT

## 2022-02-17 PROCEDURE — 84484 ASSAY OF TROPONIN QUANT: CPT

## 2022-02-17 PROCEDURE — 6370000000 HC RX 637 (ALT 250 FOR IP)

## 2022-02-17 PROCEDURE — 83036 HEMOGLOBIN GLYCOSYLATED A1C: CPT

## 2022-02-17 PROCEDURE — 92610 EVALUATE SWALLOWING FUNCTION: CPT

## 2022-02-17 PROCEDURE — 94640 AIRWAY INHALATION TREATMENT: CPT

## 2022-02-17 PROCEDURE — 84145 PROCALCITONIN (PCT): CPT

## 2022-02-17 PROCEDURE — 99222 1ST HOSP IP/OBS MODERATE 55: CPT

## 2022-02-17 PROCEDURE — 96367 TX/PROPH/DG ADDL SEQ IV INF: CPT

## 2022-02-17 PROCEDURE — 93010 ELECTROCARDIOGRAM REPORT: CPT | Performed by: INTERNAL MEDICINE

## 2022-02-17 RX ORDER — DEXTROSE MONOHYDRATE 25 G/50ML
12.5 INJECTION, SOLUTION INTRAVENOUS PRN
Status: DISCONTINUED | OUTPATIENT
Start: 2022-02-17 | End: 2022-02-17 | Stop reason: CLARIF

## 2022-02-17 RX ORDER — FUROSEMIDE 20 MG/1
20 TABLET ORAL
Status: DISCONTINUED | OUTPATIENT
Start: 2022-02-18 | End: 2022-02-21 | Stop reason: HOSPADM

## 2022-02-17 RX ORDER — ACETAMINOPHEN 325 MG/1
650 TABLET ORAL EVERY 6 HOURS PRN
Status: DISCONTINUED | OUTPATIENT
Start: 2022-02-17 | End: 2022-02-17

## 2022-02-17 RX ORDER — POLYETHYLENE GLYCOL 3350 17 G/17G
17 POWDER, FOR SOLUTION ORAL DAILY PRN
Status: DISCONTINUED | OUTPATIENT
Start: 2022-02-17 | End: 2022-02-21 | Stop reason: HOSPADM

## 2022-02-17 RX ORDER — ACETAMINOPHEN 650 MG/1
650 SUPPOSITORY RECTAL EVERY 6 HOURS PRN
Status: DISCONTINUED | OUTPATIENT
Start: 2022-02-17 | End: 2022-02-17

## 2022-02-17 RX ORDER — ONDANSETRON 2 MG/ML
4 INJECTION INTRAMUSCULAR; INTRAVENOUS EVERY 6 HOURS PRN
Status: DISCONTINUED | OUTPATIENT
Start: 2022-02-17 | End: 2022-02-21 | Stop reason: HOSPADM

## 2022-02-17 RX ORDER — ASPIRIN 81 MG/1
81 TABLET ORAL DAILY
Status: ON HOLD | COMMUNITY
End: 2022-10-28 | Stop reason: HOSPADM

## 2022-02-17 RX ORDER — ALPRAZOLAM 0.5 MG/1
0.5 TABLET ORAL 2 TIMES DAILY
Status: DISCONTINUED | OUTPATIENT
Start: 2022-02-17 | End: 2022-02-21 | Stop reason: HOSPADM

## 2022-02-17 RX ORDER — ONDANSETRON 4 MG/1
4 TABLET, FILM COATED ORAL EVERY 8 HOURS PRN
Status: DISCONTINUED | OUTPATIENT
Start: 2022-02-17 | End: 2022-02-17 | Stop reason: SDUPTHER

## 2022-02-17 RX ORDER — SODIUM CHLORIDE 9 MG/ML
25 INJECTION, SOLUTION INTRAVENOUS PRN
Status: DISCONTINUED | OUTPATIENT
Start: 2022-02-17 | End: 2022-02-20

## 2022-02-17 RX ORDER — SENNA AND DOCUSATE SODIUM 50; 8.6 MG/1; MG/1
2 TABLET, FILM COATED ORAL NIGHTLY
Status: DISCONTINUED | OUTPATIENT
Start: 2022-02-17 | End: 2022-02-21 | Stop reason: HOSPADM

## 2022-02-17 RX ORDER — MIDODRINE HYDROCHLORIDE 5 MG/1
10 TABLET ORAL
Status: ON HOLD | COMMUNITY
End: 2022-04-30 | Stop reason: HOSPADM

## 2022-02-17 RX ORDER — DIAPER,BRIEF,INFANT-TODD,DISP
EACH MISCELLANEOUS 2 TIMES DAILY
COMMUNITY

## 2022-02-17 RX ORDER — SODIUM POLYSTYRENE SULFONATE 15 G/60ML
15 SUSPENSION ORAL; RECTAL
Status: DISCONTINUED | OUTPATIENT
Start: 2022-02-20 | End: 2022-02-21 | Stop reason: HOSPADM

## 2022-02-17 RX ORDER — ACETAMINOPHEN 325 MG/1
650 TABLET ORAL EVERY 6 HOURS PRN
COMMUNITY

## 2022-02-17 RX ORDER — SODIUM CHLORIDE 0.9 % (FLUSH) 0.9 %
5-40 SYRINGE (ML) INJECTION EVERY 12 HOURS SCHEDULED
Status: DISCONTINUED | OUTPATIENT
Start: 2022-02-17 | End: 2022-02-20

## 2022-02-17 RX ORDER — ALPRAZOLAM 0.5 MG/1
0.5 TABLET ORAL 2 TIMES DAILY
Status: ON HOLD | COMMUNITY
End: 2022-02-21 | Stop reason: SDUPTHER

## 2022-02-17 RX ORDER — POLYETHYLENE GLYCOL 3350 17 G/17G
17 POWDER, FOR SOLUTION ORAL DAILY PRN
Status: DISCONTINUED | OUTPATIENT
Start: 2022-02-17 | End: 2022-02-17 | Stop reason: SDUPTHER

## 2022-02-17 RX ORDER — PANTOPRAZOLE SODIUM 40 MG/1
40 TABLET, DELAYED RELEASE ORAL
Status: DISCONTINUED | OUTPATIENT
Start: 2022-02-18 | End: 2022-02-21 | Stop reason: HOSPADM

## 2022-02-17 RX ORDER — CHLORHEXIDINE GLUCONATE 0.12 MG/ML
15 RINSE ORAL 2 TIMES DAILY
Status: DISCONTINUED | OUTPATIENT
Start: 2022-02-17 | End: 2022-02-21 | Stop reason: HOSPADM

## 2022-02-17 RX ORDER — LEVOFLOXACIN 5 MG/ML
750 INJECTION, SOLUTION INTRAVENOUS ONCE
Status: COMPLETED | OUTPATIENT
Start: 2022-02-17 | End: 2022-02-17

## 2022-02-17 RX ORDER — ASPIRIN 81 MG/1
81 TABLET ORAL DAILY
Status: DISCONTINUED | OUTPATIENT
Start: 2022-02-17 | End: 2022-02-21 | Stop reason: HOSPADM

## 2022-02-17 RX ORDER — ACETAMINOPHEN 325 MG/1
650 TABLET ORAL EVERY 6 HOURS PRN
Status: DISCONTINUED | OUTPATIENT
Start: 2022-02-17 | End: 2022-02-21 | Stop reason: HOSPADM

## 2022-02-17 RX ORDER — QUETIAPINE FUMARATE 25 MG/1
12.5 TABLET, FILM COATED ORAL NIGHTLY
Status: ON HOLD | COMMUNITY
End: 2022-10-28 | Stop reason: HOSPADM

## 2022-02-17 RX ORDER — HYDROCODONE BITARTRATE AND ACETAMINOPHEN 5; 325 MG/1; MG/1
1 TABLET ORAL EVERY 12 HOURS PRN
Status: DISCONTINUED | OUTPATIENT
Start: 2022-02-17 | End: 2022-02-21 | Stop reason: HOSPADM

## 2022-02-17 RX ORDER — ATORVASTATIN CALCIUM 80 MG/1
80 TABLET, FILM COATED ORAL NIGHTLY
Status: ON HOLD | COMMUNITY
End: 2022-10-28 | Stop reason: HOSPADM

## 2022-02-17 RX ORDER — ACETAMINOPHEN 650 MG/1
650 SUPPOSITORY RECTAL EVERY 6 HOURS PRN
Status: DISCONTINUED | OUTPATIENT
Start: 2022-02-17 | End: 2022-02-21 | Stop reason: HOSPADM

## 2022-02-17 RX ORDER — MIDODRINE HYDROCHLORIDE 10 MG/1
10 TABLET ORAL
Status: DISCONTINUED | OUTPATIENT
Start: 2022-02-18 | End: 2022-02-21 | Stop reason: HOSPADM

## 2022-02-17 RX ORDER — INSULIN GLARGINE 100 [IU]/ML
15 INJECTION, SOLUTION SUBCUTANEOUS DAILY
Status: DISCONTINUED | OUTPATIENT
Start: 2022-02-17 | End: 2022-02-21 | Stop reason: HOSPADM

## 2022-02-17 RX ORDER — ONDANSETRON 4 MG/1
4 TABLET, ORALLY DISINTEGRATING ORAL EVERY 8 HOURS PRN
Status: DISCONTINUED | OUTPATIENT
Start: 2022-02-17 | End: 2022-02-21 | Stop reason: HOSPADM

## 2022-02-17 RX ORDER — HYDROCORTISONE ACETATE 25 MG/1
25 SUPPOSITORY RECTAL 2 TIMES DAILY
Status: DISCONTINUED | OUTPATIENT
Start: 2022-02-17 | End: 2022-02-21 | Stop reason: HOSPADM

## 2022-02-17 RX ORDER — DOXAZOSIN 2 MG/1
1 TABLET ORAL NIGHTLY
Status: DISCONTINUED | OUTPATIENT
Start: 2022-02-17 | End: 2022-02-21 | Stop reason: HOSPADM

## 2022-02-17 RX ORDER — NICOTINE POLACRILEX 4 MG
15 LOZENGE BUCCAL PRN
Status: DISCONTINUED | OUTPATIENT
Start: 2022-02-17 | End: 2022-02-21 | Stop reason: HOSPADM

## 2022-02-17 RX ORDER — INSULIN GLARGINE 100 [IU]/ML
15 INJECTION, SOLUTION SUBCUTANEOUS DAILY
Status: DISCONTINUED | OUTPATIENT
Start: 2022-02-17 | End: 2022-02-17

## 2022-02-17 RX ORDER — SODIUM CHLORIDE 0.9 % (FLUSH) 0.9 %
5-40 SYRINGE (ML) INJECTION PRN
Status: DISCONTINUED | OUTPATIENT
Start: 2022-02-17 | End: 2022-02-20

## 2022-02-17 RX ORDER — IPRATROPIUM BROMIDE AND ALBUTEROL SULFATE 2.5; .5 MG/3ML; MG/3ML
1 SOLUTION RESPIRATORY (INHALATION) EVERY 4 HOURS
Status: DISCONTINUED | OUTPATIENT
Start: 2022-02-17 | End: 2022-02-17

## 2022-02-17 RX ORDER — ATORVASTATIN CALCIUM 40 MG/1
80 TABLET, FILM COATED ORAL NIGHTLY
Status: DISCONTINUED | OUTPATIENT
Start: 2022-02-17 | End: 2022-02-21 | Stop reason: HOSPADM

## 2022-02-17 RX ORDER — CHOLECALCIFEROL (VITAMIN D3) 125 MCG
500 CAPSULE ORAL DAILY
Status: DISCONTINUED | OUTPATIENT
Start: 2022-02-17 | End: 2022-02-21 | Stop reason: HOSPADM

## 2022-02-17 RX ORDER — ACETAMINOPHEN 325 MG/1
650 TABLET ORAL EVERY 6 HOURS PRN
Status: DISCONTINUED | OUTPATIENT
Start: 2022-02-17 | End: 2022-02-17 | Stop reason: SDUPTHER

## 2022-02-17 RX ORDER — QUETIAPINE FUMARATE 25 MG/1
12.5 TABLET, FILM COATED ORAL NIGHTLY
Status: DISCONTINUED | OUTPATIENT
Start: 2022-02-17 | End: 2022-02-21 | Stop reason: HOSPADM

## 2022-02-17 RX ORDER — CALCIUM ACETATE 667 MG/1
1334 CAPSULE ORAL
Status: DISCONTINUED | OUTPATIENT
Start: 2022-02-17 | End: 2022-02-21 | Stop reason: HOSPADM

## 2022-02-17 RX ORDER — LACTULOSE 10 G/15ML
20 SOLUTION ORAL DAILY PRN
Status: DISCONTINUED | OUTPATIENT
Start: 2022-02-17 | End: 2022-02-21 | Stop reason: HOSPADM

## 2022-02-17 RX ORDER — MIDODRINE HYDROCHLORIDE 5 MG/1
10 TABLET ORAL ONCE
Status: COMPLETED | OUTPATIENT
Start: 2022-02-17 | End: 2022-02-17

## 2022-02-17 RX ORDER — CALCIUM ACETATE 667 MG/1
1334 TABLET ORAL
COMMUNITY

## 2022-02-17 RX ORDER — DOCUSATE SODIUM 100 MG/1
100 CAPSULE, LIQUID FILLED ORAL 2 TIMES DAILY
Status: DISCONTINUED | OUTPATIENT
Start: 2022-02-17 | End: 2022-02-21 | Stop reason: HOSPADM

## 2022-02-17 RX ORDER — IPRATROPIUM BROMIDE AND ALBUTEROL SULFATE 2.5; .5 MG/3ML; MG/3ML
1 SOLUTION RESPIRATORY (INHALATION) EVERY 4 HOURS
Status: DISCONTINUED | OUTPATIENT
Start: 2022-02-17 | End: 2022-02-21 | Stop reason: HOSPADM

## 2022-02-17 RX ORDER — IPRATROPIUM BROMIDE AND ALBUTEROL SULFATE 2.5; .5 MG/3ML; MG/3ML
1 SOLUTION RESPIRATORY (INHALATION)
Status: DISCONTINUED | OUTPATIENT
Start: 2022-02-17 | End: 2022-02-17

## 2022-02-17 RX ORDER — DIAPER,BRIEF,INFANT-TODD,DISP
EACH MISCELLANEOUS 2 TIMES DAILY
Status: DISCONTINUED | OUTPATIENT
Start: 2022-02-17 | End: 2022-02-21 | Stop reason: HOSPADM

## 2022-02-17 RX ORDER — SODIUM POLYSTYRENE SULFONATE 15 G/60ML
15 SUSPENSION ORAL; RECTAL 4 TIMES DAILY
Status: ON HOLD | COMMUNITY
End: 2022-10-28 | Stop reason: HOSPADM

## 2022-02-17 RX ORDER — INSULIN GLARGINE 100 [IU]/ML
15 INJECTION, SOLUTION SUBCUTANEOUS DAILY
Status: ON HOLD | COMMUNITY
End: 2022-05-21 | Stop reason: HOSPADM

## 2022-02-17 RX ORDER — HEPARIN SODIUM 5000 [USP'U]/ML
5000 INJECTION, SOLUTION INTRAVENOUS; SUBCUTANEOUS EVERY 8 HOURS SCHEDULED
Status: DISCONTINUED | OUTPATIENT
Start: 2022-02-17 | End: 2022-02-21 | Stop reason: HOSPADM

## 2022-02-17 RX ORDER — WARFARIN SODIUM 6 MG/1
6 TABLET ORAL NIGHTLY
Status: ON HOLD | COMMUNITY
Start: 2021-10-10 | End: 2022-04-30 | Stop reason: SDUPTHER

## 2022-02-17 RX ORDER — MIDODRINE HYDROCHLORIDE 10 MG/1
10 TABLET ORAL PRN
Status: DISCONTINUED | OUTPATIENT
Start: 2022-02-17 | End: 2022-02-21 | Stop reason: HOSPADM

## 2022-02-17 RX ORDER — DEXTROSE MONOHYDRATE 50 MG/ML
100 INJECTION, SOLUTION INTRAVENOUS PRN
Status: DISCONTINUED | OUTPATIENT
Start: 2022-02-17 | End: 2022-02-21 | Stop reason: HOSPADM

## 2022-02-17 RX ORDER — DIPHENHYDRAMINE HCL 25 MG
25 TABLET ORAL EVERY 8 HOURS PRN
Status: DISCONTINUED | OUTPATIENT
Start: 2022-02-17 | End: 2022-02-21 | Stop reason: HOSPADM

## 2022-02-17 RX ADMIN — IPRATROPIUM BROMIDE AND ALBUTEROL SULFATE 1 AMPULE: 2.5; .5 SOLUTION RESPIRATORY (INHALATION) at 16:27

## 2022-02-17 RX ADMIN — IPRATROPIUM BROMIDE AND ALBUTEROL SULFATE 1 AMPULE: 2.5; .5 SOLUTION RESPIRATORY (INHALATION) at 20:26

## 2022-02-17 RX ADMIN — IPRATROPIUM BROMIDE AND ALBUTEROL SULFATE 1 AMPULE: 2.5; .5 SOLUTION RESPIRATORY (INHALATION) at 23:47

## 2022-02-17 RX ADMIN — HEPARIN SODIUM 5000 UNITS: 5000 INJECTION INTRAVENOUS; SUBCUTANEOUS at 21:06

## 2022-02-17 RX ADMIN — DOCUSATE SODIUM 50MG AND SENNOSIDES 8.6MG 2 TABLET: 8.6; 5 TABLET, FILM COATED ORAL at 21:04

## 2022-02-17 RX ADMIN — ALPRAZOLAM 0.5 MG: 0.5 TABLET ORAL at 21:04

## 2022-02-17 RX ADMIN — LEVOFLOXACIN 750 MG: 5 INJECTION, SOLUTION INTRAVENOUS at 07:39

## 2022-02-17 RX ADMIN — CEFEPIME 2000 MG: 2 INJECTION, POWDER, FOR SOLUTION INTRAMUSCULAR; INTRAVENOUS at 09:20

## 2022-02-17 RX ADMIN — QUETIAPINE FUMARATE 12.5 MG: 25 TABLET ORAL at 21:04

## 2022-02-17 RX ADMIN — HYDROCORTISONE: 1 CREAM TOPICAL at 23:49

## 2022-02-17 RX ADMIN — HYDROCORTISONE ACETATE 25 MG: 25 SUPPOSITORY RECTAL at 21:07

## 2022-02-17 RX ADMIN — DOCUSATE SODIUM 100 MG: 100 CAPSULE ORAL at 21:04

## 2022-02-17 RX ADMIN — CHLORHEXIDINE GLUCONATE 0.12% ORAL RINSE 15 ML: 1.2 LIQUID ORAL at 21:07

## 2022-02-17 RX ADMIN — DOXAZOSIN 1 MG: 2 TABLET ORAL at 21:04

## 2022-02-17 RX ADMIN — ATORVASTATIN CALCIUM 80 MG: 40 TABLET, FILM COATED ORAL at 21:04

## 2022-02-17 RX ADMIN — Medication 1500 MG: at 17:47

## 2022-02-17 RX ADMIN — HEPARIN SODIUM 5000 UNITS: 5000 INJECTION INTRAVENOUS; SUBCUTANEOUS at 17:46

## 2022-02-17 RX ADMIN — MIDODRINE HYDROCHLORIDE 10 MG: 5 TABLET ORAL at 10:15

## 2022-02-17 ASSESSMENT — PULMONARY FUNCTION TESTS
PIF_VALUE: 45
PIF_VALUE: 49
PIF_VALUE: 30
PIF_VALUE: 38

## 2022-02-17 NOTE — ED PROVIDER NOTES
[address] ED      CHIEF COMPLAINT  Loss of Consciousness (patient reported to have been on her way back from dialysis and went unresponsive. Patient is a trach vent patient and has been on \"spontaneous mode\" x1 week. Nurse states patient's vent said apnea and she started to desat, so she bagged her and brought her up to 99% and then patient woke up and looked at her. )       [facility]  Lary Negron is a 80 y.o. female  who presents to the ED complaining of altered mental status, unresponsiveness. Patient is end-stage renal disease on dialysis, trach and vent dependent at baseline however has been on spontaneous mode for the past week. On the way back from dialysis she reportedly went unresponsive and prevent stated that she was apneic and she started to have desaturation, she was bagged and oxygen saturation was brought up to 99% and patient woke up and looked at her nurse. Upon arrival in the ED, patient is able to follow some simple commands, unable to speak or provide any further history but is alert and looking around the room. She has no lateralizing neurologic deficits. Is able to mouth answers to questions. No other complaints, modifying factors or associated symptoms. I have reviewed the following from the nursing documentation.     Past Medical History:   Diagnosis Date    Acute and chronic respiratory failure (acute-on-chronic) ([facility])     Anxiety disorder     Atherosclerotic heart disease of native coronary artery without angina pectoris     Cerebral infarction ([facility])     Chronic pain syndrome     Dependence on renal dialysis ([address]     Dependence on respirator ([facility])     End stage renal disease ([facility])     Gastro-esophageal reflux disease with esophagitis, without bleeding     Insomnia     Major depressive disorder, recurrent ([address]     Morbid obesity due to excess calories ([facility])     Muscle weakness     Obstructive sleep apnea (adult) (pediatric)     Other hyperlipidemia     Other voice and resonance disorders     Personal history of COVID-19     [facility]monary hypertension ([facility]     Tracheostomy status ([facility]     Type 2 diabetes mellitus without complications [facility]     Unspecified atrial fibrillation ([facility]      No past surgical history on file. No family history on file. Social History     Socioeconomic History    Marital status:      Spouse name: Not on file    Number of children: Not on file    Years of education: Not on file    Highest education level: Not on file   Occupational History    Not on file   Tobacco Use    Smoking status: Former Smoker    Smokeless tobacco: Never Used   Substance and Sexual Activity    Alcohol use: Never    Drug use: Never    Sexual activity: Not on file   Other Topics Concern    Not on file   Social History Narrative    Not on file     Social Determinants of Health     Financial Resource Strain:     Difficulty of Paying Living Expenses: Not on file   Food Insecurity:     Worried About [address] in the Last Year: Not on file    [address] in the Last Year: Not on file   Transportation Needs:     Lack of Transportation (Medical): Not on file    Lack of Transportation (Non-Medical):  Not on file   Physical Activity:     Days of Exercise per Week: Not on file    Minutes of Exercise per Session: Not on file   Stress:     Feeling of Stress : Not on file   Social Connections:     Frequency of Communication with Friends and Family: Not on file    Frequency of Social Gatherings with Friends and Family: Not on file    Attends Hoahaoism Services: Not on file    Active Member of Clubs or Organizations: Not on file    Attends Club or Organization Meetings: Not on file    Marital Status: Not on file   Intimate Partner Violence:     Fear of Current or Ex-Partner: Not on file    Emotionally Abused: Not on file    Physically Abused: Not on file    Sexually Abused: Not on file   Housing Stability:  Unable to Pay for Housing in the Last Year: Not on file    Number of Places Lived in the Last Year: Not on file    Unstable Housing in the Last Year: Not on file     Current Facility-Administered Medications   Medication Dose Route Frequency Provider Last Rate Last Admin    cefepime (MAXIPIME) 2000 mg IVPB minibag  2,000 mg IntraVENous Once Joe Nichols MD        And    levoFLOXacin (LEVAQUIN) 750 MG/150ML infusion 750 mg  750 mg IntraVENous Once Joe Nichols  mL/hr at 02/17/22 0739 750 mg at 02/17/22 4983     Current Outpatient Medications   Medication Sig Dispense Refill    calcium acetate (PHOSLO) 667 MG CAPS capsule Take 667 mg by mouth 3 times daily (before meals)      diphenhydrAMINE (BENADRYL) 25 MG capsule Take 25 mg by mouth every 8 hours      docusate sodium (COLACE) 100 MG capsule Take 100 mg by mouth 2 times daily      lactulose encephalopathy 10 GM/15ML SOLN solution Take 20 g by mouth 3 times daily      polyethylene glycol (GLYCOLAX) 17 g packet Take 17 g by mouth daily as needed for Constipation      insulin NPH (HUMULIN N;NOVOLIN N) 100 UNIT/ML injection vial Inject into the skin 2 times daily (before meals)      HYDROcodone-acetaminophen (NORCO) 5-325 MG per tablet Take 1 tablet by mouth every 6 hours as needed for Pain.       hydrocortisone (ANUSOL-HC) 25 MG suppository Place 25 mg rectally 2 times daily      ipratropium-albuterol (DUONEB) 0.5-2.5 (3) MG/3ML SOLN nebulizer solution Inhale 1 vial into the lungs every 4 hours      cephALEXin (KEFLEX) 500 MG capsule Take 500 mg by mouth 4 times daily      insulin glargine (LANTUS) 100 UNIT/ML injection vial Inject into the skin nightly      furosemide (LASIX) 20 MG tablet Take 20 mg by mouth 2 times daily      loperamide (IMODIUM) 2 MG capsule Take 2 mg by mouth 4 times daily as needed for Diarrhea      melatonin 3 MG TABS tablet Take 3 mg by mouth daily      midodrine (PROAMATINE) 5 MG tablet Take 10 mg by mouth 3 times daily      dextromethorphan-guaiFENesin ([address] DM)  MG per extended release tablet Take 1 tablet by mouth every 12 hours as needed      omeprazole (PRILOSEC) 20 MG delayed release capsule Take 20 mg by mouth daily      chlorhexidine (PERIDEX) 0.12 % solution Take 15 mLs by mouth 2 times daily      sennosides-docusate sodium (SENOKOT-S) 8.6-50 MG tablet Take 1 tablet by mouth daily      sertraline (ZOLOFT) 50 MG tablet Take 50 mg by mouth daily      terazosin (HYTRIN) 1 MG capsule Take 1 mg by mouth nightly      ondansetron (ZOFRAN) 4 MG tablet Take 4 mg by mouth every 8 hours as needed for Nausea or Vomiting       Allergies   Allergen Reactions    Haloperidol Lactate      Other reaction(s): Unknown (See Comments)  PT DOESN'T REMEMBER  Other reaction(s): Unknown (See Comments)  PT DOESN'T REMEMBER      Ziprasidone Hcl      Other reaction(s): Other (See Comments), Unknown (See Comments)  PT DOESN'T REMEMBER  PT DOESN'T REMEMBER  Other reaction(s): Unknown (See Comments)  PT DOESN'T REMEMBER         REVIEW OF SYSTEMS  10 systems reviewed, pertinent positives per HPI otherwise noted to be negative. PHYSICAL EXAM  BP (!) 154/101   Pulse 121   Temp 97.9 °F (36.6 °C) (Axillary)   Resp 28   SpO2 100%    GENERAL APPEARANCE: Awake and alert. Cooperative. No acute distress. HENT: Normocephalic. Atraumatic. NECK: Supple. Tracheostomy  EYES: PERRL. EOM's grossly intact. HEART/CHEST: RRR. No murmurs. LUNGS: Diminished breath sounds bilaterally, more so on left. ABDOMEN: No tenderness. Soft. Non-distended. No masses. No organomegaly. No guarding or rebound. MUSCULOSKELETAL: Edema of bilateral lower extremities. Compartments soft. No deformity. No tenderness in the extremities. All extremities neurovascularly intact. SKIN: Warm and dry. No acute rashes.    NEUROLOGICAL: Alert, able to look around room and follow some simple commands, mouth words, no lateralizing neurologic deficits  PSYCHIATRIC: Normal mood and affect. LABS  I have reviewed all labs for this visit.    Results for orders placed or performed during the hospital encounter of 02/17/22   CBC with Auto Differential   Result Value Ref Range    WBC 15.1 (H) 4.0 - 11.0 K/uL    RBC 3.34 (L) 4.00 - 5.20 M/uL    Hemoglobin 9.7 (L) 12.0 - 16.0 g/dL    Hematocrit 31.8 (L) 36.0 - 48.0 %    MCV 95.0 80.0 - 100.0 fL    MCH 29.0 26.0 - 34.0 pg    MCHC 30.5 (L) 31.0 - 36.0 g/dL    RDW 19.1 (H) 12.4 - 15.4 %    Platelets 281 312 - 012 K/uL    MPV 7.2 5.0 - 10.5 fL    Neutrophils % 87.2 %    Lymphocytes % 7.7 %    Monocytes % 3.1 %    Eosinophils % 1.7 %    Basophils % 0.3 %    Neutrophils Absolute 13.2 (H) 1.7 - 7.7 K/uL    Lymphocytes Absolute 1.2 1.0 - 5.1 K/uL    Monocytes Absolute 0.5 0.0 - 1.3 K/uL    Eosinophils Absolute 0.3 0.0 - 0.6 K/uL    Basophils Absolute 0.0 0.0 - 0.2 K/uL   Comprehensive Metabolic Panel w/ Reflex to MG   Result Value Ref Range    Sodium 137 136 - 145 mmol/L    Potassium reflex Magnesium 4.7 3.5 - 5.1 mmol/L    Chloride 95 (L) 99 - 110 mmol/L    CO2 25 21 - 32 mmol/L    Anion Gap 17 (H) 3 - 16    Glucose 186 (H) 70 - 99 mg/dL    BUN 70 (H) 7 - 20 mg/dL    CREATININE 6.8 (HH) 0.6 - 1.2 mg/dL    GFR Non- 6 (A) >60    GFR  7 (A) >60    Calcium 8.9 8.3 - 10.6 mg/dL    Total Protein 8.4 (H) 6.4 - 8.2 g/dL    Albumin 3.5 3.4 - 5.0 g/dL    Albumin/Globulin Ratio 0.7 (L) 1.1 - 2.2    Total Bilirubin 0.4 0.0 - 1.0 mg/dL    Alkaline Phosphatase 117 40 - 129 U/L    ALT 8 (L) 10 - 40 U/L    AST 12 (L) 15 - 37 U/L   Lactate, Sepsis   Result Value Ref Range    Lactic Acid, Sepsis 1.3 0.4 - 1.9 mmol/L   Blood Gas, Venous   Result Value Ref Range    pH, Vickey 7.288 (L) 7.350 - 7.450    pCO2, Vickey 57.8 (H) 40.0 - 50.0 mmHg    pO2, Vickey 90.4 (H) 25.0 - 40.0 mmHg    HCO3, Venous 27.0 23.0 - 29.0 mmol/L    Base Excess, Vickey -0.3 -3.0 - 3.0 mmol/L    O2 Sat, Vickey 96 Not Established % Carboxyhemoglobin 3.7 (H) 0.0 - 1.5 %    MetHgb, Vickey 0.3 <1.5 %    TC02 (Calc), Vickey 29 Not Established mmol/L    O2 Content, Vickey 14 Not Established VOL %    O2 Therapy Unknown    Troponin   Result Value Ref Range    Troponin 0.20 (H) <0.01 ng/mL   Brain Natriuretic Peptide   Result Value Ref Range    Pro-BNP 15,169 (H) 0 - 124 pg/mL   POCT Glucose   Result Value Ref Range    POC Glucose 166 (H) 70 - 99 mg/dl    Performed on ACCU-CHEK    EKG 12 Lead   Result Value Ref Range    Ventricular Rate 117 BPM    Atrial Rate 117 BPM    P-R Interval 184 ms    QRS Duration 70 ms    Q-T Interval 356 ms    QTc Calculation (Bazett) 496 ms    P Axis -55 degrees    R Axis -9 degrees    T Axis 73 degrees    Diagnosis       Undetermined rhythmUnusual P axis, possible ectopic atrial tachycardia or junctional tachycardiaLow voltage QRSInferior infarct , age undeterminedCannot rule out Anterior infarct , age undeterminedNonspecific ST abnormalityAbnormal ECGConfirmed by CINDY PANG MD ([id number]) on 2/17/2022 7:51:57 AM       ECG  The Ekg interpreted by me shows  Sinus tachycardia  Axis is   Left axis deviation  QTc is  prolonged at 496  First-degree AV block  ST Segments: nonspecific changes  No significant change from prior EKG dated 1/10/2022    RADIOLOGY  XR CHEST PORTABLE   Final Result   CHF with pulmonary edema and or superimposed pneumonia. ED COURSE/MDM  Patient seen and evaluated. Old records reviewed. Labs and imaging reviewed and results discussed with patient. Patient is a 23-year-old female, history of end-stage renal disease on dialysis, chronically on trach/vent, presenting with concerns for apnea and altered mental status which had improved prior to arrival.  Full HPI as detailed above. Upon arrival in the ED, vitals remarkable for tachycardia and tachypnea, patient does meet SIRS criteria and was upset of apnea/concern for low oxygen saturations patient was started on antibiotics for presumed pneumonia. Labs were performed, did reveal evidence of chronic renal failure with creatinine of 6.8 however potassium is normal at 4.7. Her lactate is within normal limits at 1.3. Her proBNP is significantly elevated at 15,000. Troponin elevated 0.2, she does appear to have a chronic troponin elevation likely secondary to her chronic kidney disease. Also found to have a leukocytosis of 15.1. Chest x-ray reveals CHF with pulmonary edema and/or superimposed pneumonia. History of end-stage renal disease, and stable blood pressures was not treated with sepsis dose fluids. VBG with pH of 7.288, PCO2 of 57. Patient requiring ventilatory support which is a change from her over the past week. She will require hospitalization for her pulmonary edema/likely pneumonia given her chest x-ray and lab findings. Patient is comfortable and in agreement with plan of care. The total Critical Care time is 35 minutes which excludes separately billable procedures. During the patient's ED course, the patient was given:  Medications   cefepime (MAXIPIME) 2000 mg IVPB minibag (has no administration in time range)     And   levoFLOXacin (LEVAQUIN) 750 MG/150ML infusion 750 mg (750 mg IntraVENous New Bag 2/17/22 0739)        CLINICAL IMPRESSION  1. Acute on chronic respiratory failure with hypoxia (HCC)    2. Pneumonia due to infectious organism, unspecified laterality, unspecified part of lung    3. ESRD on dialysis ([facility]    4. Acute pulmonary edema (HCC)        Blood pressure (!) 154/101, pulse 121, temperature 97.9 °F (36.6 °C), temperature source Axillary, resp. rate 28, SpO2 100 %. DISPOSITION  Maggi Gomes was admitted in fair condition. Patient was given scripts for the following medications. I counseled patient how to take these medications. New Prescriptions    No medications on file       Follow-up with:  No follow-up provider specified. DISCLAIMER: This chart was created using Dragon dictation software.   Efforts were made by me to ensure accuracy, however some errors may be present due to limitations of this technology and occasionally words are not transcribed correctly.        Ryan Kaiser MD  02/17/22 3959

## 2022-02-17 NOTE — PROGRESS NOTES
Pharmacy Note  Vancomycin Consult    Maggi Gomes is a 80 y.o. female started on Vancomycin for VAP; consult received from SHERMAN Bar to manage therapy. Also receiving the following antibiotics: Cefepime. Patient Active Problem List   Diagnosis    Chest pain    Unresponsive episode    Syncope    Elevated troponin    Acute respiratory failure with hypoxia and hypercapnia (HCC)    HCAP (healthcare-associated pneumonia)    Abnormal chest x-ray    ESRD on dialysis [facility])    Chronic respiratory failure requiring continuous mechanical ventilation through tracheostomy ([address]     Allergies:  Haloperidol lactate and Ziprasidone hcl     Temp max:     Recent Labs     02/17/22  0725   BUN 70*   CREATININE 6.8*   WBC 15.1*     No intake or output data in the 24 hours ending 02/17/22 1544  Culture Date      Source                       Results  NGTD    Ht Readings from Last 1 Encounters:   No data found for Ht        Wt Readings from Last 1 Encounters:   06/17/21 (!) 310 lb 12.8 oz (141 kg)       There is no height or weight on file to calculate BMI. CrCl cannot be calculated (Unknown ideal weight. ). Assessment/Plan:  Will initiate Vancomycin with a one time loading dose of 1500mg x1, Normal HD days, T,Th,Sat. Per renal note will have HD tomorrow. Will order level in am. Pulse dose with HD. Janay Lo Pharm D 7/91/52154:24 PM  .    . Thank you for the consult. Will continue to follow.

## 2022-02-17 NOTE — H&P
Hospital Medicine History & Physical      PCP: Juani Wagner MD    Date of Admission: 2/17/2022    Date of Service: Pt seen/examined on 2/17/2022      Chief Complaint:    Chief Complaint   Patient presents with    Loss of Consciousness     patient reported to have been on her way back from dialysis and went unresponsive. Patient is a trach vent patient and has been on \"spontaneous mode\" x1 week. Nurse states patient's vent said apnea and she started to desat, so she bagged her and brought her up to 99% and then patient woke up and looked at her. History Of Present Illness: The patient is a 80 y.o. female with chronic respiratory failure with chronic trach vent, chronic dCHF, CAD, ESRD on HD TuThSat, hx of CVA, pulmonary htn, HLD, DMII, GERD, SILVER and morbid obesity who presented to [facility] ED with complaint of unresponsive episode. Patient is resident of [facility] with chronic trach vent. She is alert but unable to form words for me. It appears she understands me as she is able to answer questions by shaking her head to indicate yes and no but communication is difficult. I attempted to communicate via writing and the only things I could make out were \"Butt hurts\" and \"Don't leave\". She does not indicate any other issues and denies ROS but with limited communication she is not a reliable historian. I was able to get reach her daughter as well as her RN from [facility]. My history is obtained from a combination of these two and chart review. According to daughter and RN, patient is usually able to communicate via spoken words at baseline. She is ESRD and receives HD TuThSat. She has reportedly missed dialysis 3 times due to refusal.  She was receiving dialysis this morning when she asked them to stop abruptly. She was transferred back to [facility] and as she was being transferred [facility] lift she became unresponsive which prompted visit to the emergency department.   According to both daughter and RN her rectal pain has been chronic for the last 3 years she was worked up as an outpatient with general surgery and she receives Preparation H for this. In the ED her vital signs were stable and she was on her home ventilator settings. She was found to be fluid overloaded with a possible superimposed pneumonia via chest x-ray, proBNP was elevated to greater than 15,000, procalcitonin elevated to 0.96. Troponin was also found to be elevated. She is admitted for further care    Past Medical History:        Diagnosis Date    Acute and chronic respiratory failure (acute-on-chronic) ([facility])     Anxiety disorder     Atherosclerotic heart disease of native coronary artery without angina pectoris     Cerebral infarction ([facility])     Chronic pain syndrome     [facility]     Dependence on respirator ([facility])     End stage renal disease ([facility])     Gastro-esophageal reflux disease with esophagitis, without bleeding     Insomnia     Major depressive disorder, recurrent ([address]     Morbid obesity due to excess calories ([facility])     Muscle weakness     Obstructive sleep apnea (adult) (pediatric)     Other hyperlipidemia     Other voice and resonance disorders     Personal history of COVID-19     Pulmonary hypertension ([address]     Tracheostomy status ([address]     Type 2 diabetes mellitus without complications ([address]     Unspecified atrial fibrillation ([facility]        Past Surgical History:    History reviewed. No pertinent surgical history. Medications Prior to Admission:    Prior to Admission medications    Medication Sig Start Date End Date Taking?  Authorizing Provider   warfarin (COUMADIN) 6 MG tablet Take 6 mg by mouth nightly 10/10/21  Yes Historical Provider, MD   midodrine (PROAMATINE) 5 MG tablet Take 10 mg by mouth three times a week Tues Thurs Sat- mid dialysis treatment   Yes Historical Provider, MD   cyanocobalamin 1000 MCG tablet Take 500 mcg by mouth daily   Yes Historical Provider, MD QUEtiapine (SEROQUEL) 25 MG tablet Take 12.5 mg by mouth nightly   Yes Historical Provider, MD   ALPRAZolam (XANAX) 0.5 MG tablet Take 0.5 mg by mouth in the morning and at bedtime. Yes Historical Provider, MD   sodium polystyrene (KAYEXALATE) 15 GM/60ML suspension Take 15 g by mouth 4 times daily Sun, Mon, Wed, Fri   Yes Historical Provider, MD   calcium acetate 667 MG TABS Take 1,334 mg by mouth 3 times daily (with meals)   Yes Historical Provider, MD   hydrocortisone (PREPARATION H) 1 % cream Apply topically 2 times daily Apply topically 2 times daily. Yes Historical Provider, MD   acetaminophen (TYLENOL) 325 MG tablet Take 650 mg by mouth every 6 hours as needed for Pain   Yes Historical Provider, MD   atorvastatin (LIPITOR) 80 MG tablet Take 80 mg by mouth at bedtime   Yes Historical Provider, MD   insulin glargine (LANTUS) 100 UNIT/ML injection vial Inject 15 Units into the skin daily    Historical Provider, MD   aspirin 81 MG EC tablet Take 81 mg by mouth daily    Historical Provider, MD   diphenhydrAMINE (BENADRYL) 25 MG capsule Take 25 mg by mouth every 8 hours as needed     Historical Provider, MD   docusate sodium (COLACE) 100 MG capsule Take 100 mg by mouth 2 times daily    Historical Provider, MD   lactulose encephalopathy 10 GM/15ML SOLN solution Take 20 g by mouth daily as needed     Historical Provider, MD   polyethylene glycol (GLYCOLAX) 17 g packet Take 17 g by mouth daily as needed for Constipation    Historical Provider, MD   insulin NPH (HUMULIN N;NOVOLIN N) 100 UNIT/ML injection vial Inject into the skin every 6 hours     Historical Provider, MD   HYDROcodone-acetaminophen (NORCO) 5-325 MG per tablet Take 1 tablet by mouth every 12 hours as needed for Pain.      Historical Provider, MD   hydrocortisone (ANUSOL-HC) 25 MG suppository Place 25 mg rectally 2 times daily    Historical Provider, MD   ipratropium-albuterol (DUONEB) 0.5-2.5 (3) MG/3ML SOLN nebulizer solution Inhale 1 vial into the lungs every 4 hours    Historical Provider, MD   furosemide (LASIX) 20 MG tablet Take 20 mg by mouth three times a week MW    Historical Provider, MD   loperamide (IMODIUM) 2 MG capsule Take 2 mg by mouth 4 times daily as needed for Diarrhea    Historical Provider, MD   midodrine (PROAMATINE) 5 MG tablet Take 10 mg by mouth three times a week Tues thurs sat pre dialysis    Historical Provider, MD   dextromethorphan-guaiFENesin (MUCINEX DM)  MG per extended release tablet Take 1 tablet by mouth 2 times daily     Historical Provider, MD   omeprazole (PRILOSEC) 20 MG delayed release capsule Take 20 mg by mouth daily    Historical Provider, MD   chlorhexidine (PERIDEX) 0.12 % solution Take 15 mLs by mouth 2 times daily    Historical Provider, MD   sennosides-docusate sodium (SENOKOT-S) 8.6-50 MG tablet Take 2 tablets by mouth at bedtime     Historical Provider, MD   sertraline (ZOLOFT) 50 MG tablet Take 50 mg by mouth daily    Historical Provider, MD   terazosin (HYTRIN) 1 MG capsule Take 1 mg by mouth nightly    Historical Provider, MD   ondansetron (ZOFRAN) 4 MG tablet Take 4 mg by mouth every 8 hours as needed for Nausea or Vomiting    Historical Provider, MD       Allergies:  Haloperidol lactate and Ziprasidone hcl    Social History:  The patient [facility] at [facility]:   reports that she has quit smoking. She has never used smokeless tobacco.  ETOH:   reports no history of alcohol use. Family History:   Positive as follows:    History reviewed. No pertinent family history. REVIEW OF SYSTEMS:       Patient is alert but orientation questions are difficult to assess 2/2 patient nonverbal.  She answers questions appropriatly for me but communicates by shaking head yes/no.      Constitutional: Negative for fever, + nonverbal  HENT: Negative for sore throat   Eyes: Negative for redness   Respiratory: Negative  for dyspnea, cough   Cardiovascular: Negative for chest pain   Gastrointestinal: Negative for vomiting, diarrhea, + rectal pain, chronic  Genitourinary: Negative for hematuria   Musculoskeletal: Negative for arthralgias   Skin: Negative for rash   Neurological: Negative for syncope   Hematological: Negative for adenopathy   Psychiatric/Behavorial: Negative for anxiety    PHYSICAL EXAM:    /72   Pulse 92   Temp 97.6 °F (36.4 °C) (Oral)   Resp 24   Wt (!) 316 lb 14.4 oz (143.7 kg)   SpO2 100%     Gen: Moderate distress. Alert. Nonverbal. Morbidly obese. Eyes: PERRL. No sclera icterus. No conjunctival injection. ENT: No discharge. Pharynx clear. Neck: Trachea midline. Tracheostomy vent in place. Resp: No accessory muscle use. No crackles. No wheezes. No rhonchi. CV: Regular rate. Regular rhythm. No murmur. No rub. No edema. Capillary Refill: Brisk,< 3 seconds   Peripheral Pulses: +2 palpable, equal bilaterally   GI: Non-tender. Non-distended. No masses. No organomegaly. Normal bowel sounds. No hernia. Skin: Warm and dry. No nodule on exposed extremities. No rash on exposed extremities. M/S: No cyanosis. No joint deformity. No clubbing. Neuro: Awake. Grossly nonfocal    Psych: Orientation unclear 2/2 patient nonverbal. Appears anxious. CBC:   Recent Labs     02/17/22  0725   WBC 15.1*   HGB 9.7*   HCT 31.8*   MCV 95.0        BMP:   Recent Labs     02/17/22  0725      K 4.7   CL 95*   CO2 25   BUN 70*   CREATININE 6.8*     LIVER PROFILE:   Recent Labs     02/17/22  0725   AST 12*   ALT 8*   BILITOT 0.4   ALKPHOS 117     CARDIAC ENZYMES  Recent Labs     02/17/22  0725 02/17/22  1535 02/17/22 2014   TROPONINI 0.20* 0.39* 0.37*     CULTURES  Results for Cheyanne Vaughn (MRN [id number]) as of 2/17/2022 11:35   Ref.  Range 2/17/2022 09:20   INFLUENZA A Latest Ref Range: NOT DETECTED  NOT DETECTED   INFLUENZA B Latest Ref Range: NOT DETECTED  NOT DETECTED   SARS-CoV-2 RNA, RT PCR Latest Ref Range: NOT DETECTED  NOT DETECTED       EKG:  I have reviewed the EKG with the following interpretation:   Undetermined rhythm  Unusual P axis, possible ectopic atrial tachycardia or junctional tachycardia  Low voltage QRS  Inferior infarct , age undetermined  Cannot rule out Anterior infarct , age undetermined  Nonspecific ST abnormality  Abnormal ECG  Confirmed by CINDY PANG MD ([id number]) on 2/17/2022 7:51:57 AM    RADIOLOGY  XR CHEST PORTABLE   Final Result   CHF with pulmonary edema and or superimposed pneumonia. Pertinent previous results reviewed   ECHO 9/30/19 [employer]  Narrative    · Estimated EF = 60%. · Left ventricular systolic function is normal.    ASSESSMENT/PLAN:  #Unresponsiveness and ? Syncope  -Acute on chronic resp failure vs fluid overload vs PNA  -? Multifactorial  -Alert now although not at baseline per family and [facility] RN  -Work up as below    [facility] on HD TuThSat  -Has missed last 3 dialysis sessions as reported by [facility] RN due to refusal  -Fluid overloaded, Pulmonary edema on CXR  -Pro-BNP elevated >15,000, previous was 6377  -Unclear baseline Crt and GFR but kidney fuction appears to be declined  -Nephrology consult; plan for HD tomorrow  -Requires midodrine for HD    #Acute on chronic respiratory failure with chronic trach vent  #Pulmonary HTN  #? HCAP  -Reportedly desaturated during [address] transition at [facility] with unresponsiveness  -Bagged to 99% and patient had return of conciousness  -Pulm consult; vent settings per pulm  -Pulm edema vs superimposed pna on CXR  -PCT 0.96  -Vacno and cefepime D#1  -Continue home neb    #Elevated troponin  -Flat elevation  -No CP  -EKG with no ischemia  -Likely 2/2 ESRD    #Chronic dCHF no ae  -BNP elevated  -Fluid overloaded but likely 2/2 need for dialysis  -EKG with no ischemia  -Cardiology consult; will repeat ECHO    #Rectal pain; hemorrhoids   -Chronic, x3 years per daugther  -Has been worked up for this as OP  -Continue pain control and bowel regimen    #CAD  -Continue ASA, statin    #Hx of CVA  -Pharm to dose warfarin     #HLD  -On statin    #DMII  -Lantus 15 units daily  -Med dose SSI  -POCT glucose    #GERD  -Continue PPI    #SILVER  -On chronic trach vent    #Mood disorder  -Continue psych meds    #Morbid Obesity  -Complicating assessment and treatment. Placing patient at risk for multiple co-morbidities as well as early death and contributing to the patient's presentation.   - Recommend weight loss.     DVT Prophylaxis: Heparin  Diet: Diet NPO  Code Status: Full Code    Saddie Pallas PA-C  2/17/2022 9:28 PM

## 2022-02-17 NOTE — PROGRESS NOTES
Spoke with [facility]. States pt. Has been on spontaneous support on vent. Usually able to eat and take pills whole. Speech in to see pt. Advises to keep pt. NPO at this time, until breathing improved and pts. Cuff possibly able to be deflated some to make swallowing easier for her.  Jewels Stephenson RN

## 2022-02-17 NOTE — ED NOTES
Called cardiology for routine consult. Talked to [facility].      Connor Kyle  02/17/22 1111

## 2022-02-17 NOTE — ED NOTES
Dr. Sary Baer went ahead and told Dr. Marcelo Gone about this pt.         Allan Pandey  02/17/22 [id number]

## 2022-02-17 NOTE — CONSULTS
[facility]  [phone]        Reason for Consultation/Chief Complaint: unresponsive episode; c/o being cold now  Consulted for Fluid Overload  No known cardio      History of Present Illness:  Rema Lopez is a 80 y.o. patient who presented to [facility] ED 2/17/2022 after episode of unresponsiveness after HD. She was bagged and O2 went up to 99% and she woke up. Reported altered mental status following dialysis. She has a PMH respiratory failure s/p trach and vent dependent at baseline (intubated 12/26/20, extubated 1/1/21, reintubated 1/22/21), SILVER, CAD, ESRD on dialysis, GERD, HLD, pulmonary HTN, Hx of COVID 10/6/2020, and afib prior. Most recent ECHO 9/30/2019 EF=60%. Upon arrival to ED she was able to follow simple commands and able to mouth answers to questions. She is alert now and denies specific complaints other than feeling cold. She does c/o occasional atypical right CP close to armpit. Labs: BUN 70, Cr 6.8, GFR 6, Carlos 0.02; BNP 15,169, AST 12, ALT 8, H/H 9.7/31.8. Note CXR shows CHF with edema and or superimposed pneumonia. Note EKG Undetermined rhythm Unusual P axis, possible ectopic atrial tachycardia or junctional tachycardia Low voltage QRS Inferior infarct , age undetermined Cannot rule out Anterior infarct, age undeterminedNonspecific ST abnormality. Patient with no complaints of chest pain, SOB, palpitations, dizziness, edema, or orthopnea/PND. I have been asked to provide consultation regarding further management and testing.       Past Medical History:   has a past medical history of Acute and chronic respiratory failure (acute-on-chronic) ([facility] Anxiety disorder, Atherosclerotic heart disease of native coronary artery without angina pectoris, Cerebral infarction [facility]), Chronic pain syndrome, Dependence on renal dialysis ([facility] Dependence on respirator ([facility] End stage renal disease ([facility] Gastro-esophageal reflux disease with esophagitis, without bleeding, Insomnia, Major depressive disorder, recurrent ([facility] Morbid obesity due to excess calories ([facility] Muscle weakness, Obstructive sleep apnea (adult) (pediatric), Other hyperlipidemia, Other voice and resonance disorders, Personal history of COVID-19, Pulmonary hypertension ([facility] Tracheostomy status ([facility] Type 2 diabetes mellitus without complications ([facility] and Unspecified atrial fibrillation ([facility] Surgical History:   has no past surgical history on file. Social History:   reports that she has quit smoking. She has never used smokeless tobacco. She reports that she does not drink alcohol and does not use drugs. Family History:  family history is negative for significant heart disease in parents     Home Medications:  Were reviewed and are listed in nursing record. and/or listed below  Prior to Admission medications    Medication Sig Start Date End Date Taking? Authorizing Provider   calcium acetate (PHOSLO) 667 MG CAPS capsule Take 667 mg by mouth 3 times daily (before meals) 10/10/21   Historical Provider, MD   diphenhydrAMINE (BENADRYL) 25 MG capsule Take 25 mg by mouth every 8 hours    Historical Provider, MD   docusate sodium (COLACE) 100 MG capsule Take 100 mg by mouth 2 times daily    Historical Provider, MD   lactulose encephalopathy 10 GM/15ML SOLN solution Take 20 g by mouth 3 times daily    Historical Provider, MD   polyethylene glycol (GLYCOLAX) 17 g packet Take 17 g by mouth daily as needed for Constipation    Historical Provider, MD   insulin NPH (HUMULIN N;NOVOLIN N) 100 UNIT/ML injection vial Inject into the skin 2 times daily (before meals)    Historical Provider, MD   HYDROcodone-acetaminophen (NORCO) 5-325 MG per tablet Take 1 tablet by mouth every 6 hours as needed for Pain.     Historical Provider, MD   hydrocortisone (ANUSOL-HC) 25 MG suppository Place 25 mg rectally 2 times daily    Historical Provider, MD   ipratropium-albuterol (DUONEB) 0.5-2.5 (3) MG/3ML SOLN nebulizer solution Inhale 1 vial into the lungs every 4 hours    Historical Provider, MD   cephALEXin (KEFLEX) 500 MG capsule Take 500 mg by mouth 4 times daily    Historical Provider, MD   insulin glargine (LANTUS) 100 UNIT/ML injection vial Inject into the skin nightly    Historical Provider, MD   furosemide (LASIX) 20 MG tablet Take 20 mg by mouth 2 times daily    Historical Provider, MD   loperamide (IMODIUM) 2 MG capsule Take 2 mg by mouth 4 times daily as needed for Diarrhea    Historical Provider, MD   melatonin 3 MG TABS tablet Take 3 mg by mouth daily    Historical Provider, MD   midodrine (PROAMATINE) 5 MG tablet Take 10 mg by mouth 3 times daily    Historical Provider, MD   dextromethorphan-guaiFENesin (MUCINEX DM)  MG per extended release tablet Take 1 tablet by mouth every 12 hours as needed    Historical Provider, MD   omeprazole (PRILOSEC) 20 MG delayed release capsule Take 20 mg by mouth daily    Historical Provider, MD   chlorhexidine (PERIDEX) 0.12 % solution Take 15 mLs by mouth 2 times daily    Historical Provider, MD   sennosides-docusate sodium (SENOKOT-S) 8.6-50 MG tablet Take 1 tablet by mouth daily    Historical Provider, MD   sertraline (ZOLOFT) 50 MG tablet Take 50 mg by mouth daily    Historical Provider, MD   terazosin (HYTRIN) 1 MG capsule Take 1 mg by mouth nightly    Historical Provider, MD   ondansetron (ZOFRAN) 4 MG tablet Take 4 mg by mouth every 8 hours as needed for Nausea or Vomiting    Historical Provider, MD        Allergies:  Haloperidol lactate and Ziprasidone hcl     Review of Systems:   12 point ROS negative in all areas as listed below except as in [place]  Constitutional, EENT, Cardiovascular, pulmonary, GI, , Musculoskeletal, skin, neurological, hematological, endocrine, Psychiatric    Physical Examination:    Vitals:    02/17/22 0902   BP: (!) 103/58   Pulse: 94   Resp:    Temp:    SpO2:               General Appearance:  Alert, cooperative, no distress, appears stated age   Head: Normocephalic, without obvious abnormality, atraumatic   Eyes:  PERRL, conjunctiva/corneas clear       Nose: Nares normal, no drainage or sinus tenderness   Throat: Lips, mucosa, and tongue normal   Neck: Supple, symmetrical, trachea midline, no adenopathy, thyroid: not enlarged, symmetric, no tenderness/mass/nodules, no carotid bruit or JVD; +trach       Lungs:   Clear to auscultation bilaterally, respirations unlabored   Chest Wall:  No tenderness or deformity   Heart:  Regular rate and rhythm, S1, S2 normal, no murmur, rub or gallop   Abdomen:   Soft, non-tender, bowel sounds active all four quadrants,  no masses, no organomegaly           Extremities: Extremities normal, atraumatic, no cyanosis or edema   Pulses: 2+ and symmetric   Skin: Skin color, texture, turgor normal, no rashes or lesions   Pysch: Normal mood and affect   Neurologic: Normal gross motor and sensory exam.         Labs  CBC:   Lab Results   Component Value Date    WBC 15.1 02/17/2022    RBC 3.34 02/17/2022    HGB 9.7 02/17/2022    HCT 31.8 02/17/2022    MCV 95.0 02/17/2022    RDW 19.1 02/17/2022     02/17/2022     CMP:    Lab Results   Component Value Date     02/17/2022    K 4.7 02/17/2022    CL 95 02/17/2022    CO2 25 02/17/2022    BUN 70 02/17/2022    CREATININE 6.8 02/17/2022    GFRAA 7 02/17/2022    AGRATIO 0.7 02/17/2022    LABGLOM 6 02/17/2022    GLUCOSE 186 02/17/2022    PROT 8.4 02/17/2022    CALCIUM 8.9 02/17/2022    BILITOT 0.4 02/17/2022    ALKPHOS 117 02/17/2022    AST 12 02/17/2022    ALT 8 02/17/2022     PT/INR:  No results found for: PTINR  Lab Results   Component Value Date    TROPONINI 0.20 (H) 02/17/2022       EKG:  I have reviewed EKG with the following interpretation:  Impression:  See HPI    EKG 2/17/2022  Undetermined rhythm Unusual P axis, possible ectopic atrial tachycardia or junctional tachycardia Low voltage QRS Inferior infarct , age undetermined Cannot rule out Anterior infarct, age undeterminedNonspecific ST abnormality     CXR 2/17/2022  CHF with pulmonary edema and or superimposed pneumonia. Echo 9/30/2019  · Estimated EF = 60%. · Left ventricular systolic function is normal.      Assessment:  Jeffry Jones is a 80 y.o. patient who presented to [facility] ED 2/17/2022 after episode of unresponsiveness after HD. She was bagged and O2 went up to 99% and she woke up. Reported altered mental status following dialysis. She has a PMH respiratory failure s/p trach and vent dependent at baseline (intubated 12/26/20, extubated 1/1/21, reintubated 1/22/21), SILVER, CAD, ESRD on dialysis, GERD, HLD, pulmonary HTN, Hx of COVID 10/6/2020, and afib prior. Most recent ECHO 9/30/2019 EF=60%. Upon arrival to ED she was able to follow simple commands and able to mouth answers to questions. She is alert now and denies specific complaints other than feeling cold. She does c/o occasional atypical right CP close to armpit. Labs: BUN 70, Cr 6.8, GFR 6, Carlos 0.02; BNP 15,169, AST 12, ALT 8, H/H 9.7/31.8. Note CXR shows CHF with edema and or superimposed pneumonia. Note EKG Undetermined rhythm Unusual P axis, possible ectopic atrial tachycardia or junctional tachycardia Low voltage QRS Inferior infarct , age undetermined Cannot rule out Anterior infarct, age undeterminedNonspecific ST abnormality. Diagnosis of unresponsiveness and ? Syncope in older female with chronic vent dependence and ESRD on dialysis. Recs:  1. EKG unremarkable for ischemia and no concerning symptoms of angina. 2. Carlos mild elevation could be related to ESRD. 3. Dr. Evangelist Houston consulted. He reviewed CXR and reports right base infiltrate. Possible PNA. 4. Possible fluid overload needs to be addressed by dialysis. Renal consult pending. 5. ECHO pending    If ECHO without significant change I would not pursue further cardiac w/u at this time and cardiology will sign off. Thanks.      Patient Active Problem List   Diagnosis    Chest pain Thank you for allowing to us to participate in the bairon or Jammie Marshall. Further evaluation will be based upon the patient's clinical course and testing results.

## 2022-02-17 NOTE — PROGRESS NOTES
4 Eyes Skin Assessment     The patient is being assess for   Admission    I agree that 2 RN's have performed a thorough Head to Toe Skin Assessment on the patient. ALL assessment sites listed below have been assessed. Areas assessed for pressure by both nurses:   [x]   Head, Face, and Ears   [x]   Shoulders, Back, and Chest, Abdomen  [x]   Arms, Elbows, and Hands   [x]   Coccyx, Sacrum, and Ischium  [x]   Legs, Feet, and Heels   Dry/ flaky skin to lower extremities. Moisture noted in abdominal/ groin folds and under breasts. Skin Assessed Under all Medical Devices by both nurses:  n/a              All Mepilex Borders were peeled back and area peeked at by both nurses:  No: n/a  Please list where Mepilex Borders are located:  n/a             **SHARE this note so that the co-signing nurse is able to place an eSignature**    Co-signer eSignature: Electronically signed by Keira Hines RN on 2/17/22 at 5:21 PM EST    Does the Patient have Skin Breakdown related to pressure?   No              Stu Prevention initiated:  Yes   Wound Care Orders initiated:  NA      [facility] nurse consulted for Pressure Injury (Stage 3,4, Unstageable, DTI, NWPT, Complex wounds)and New or Established Ostomies:  NA      Primary Nurse eSignature: Electronically signed by Jone Wadsworth RN on 2/17/22 at 5:20 PM EST

## 2022-02-17 NOTE — PROGRESS NOTES
Warfarin Dosing - Pharmacy Consult Note  Consulting Provider: Yaquelin Wagner  Indication:  History of  VTE/PE  Warfarin Dose prior to admission:  6mg daily  Concurrent anticoagulants/antiplatelets: no  Significant Drug Interactions: No obvious interactions  Recent Labs     02/17/22  0725   INR 3.78*   HGB 9.7*      LABALBU 3.5     Recent warfarin administrations        No warfarin orders with administrations found. Orders not given:            warfarin placeholder: dosing by pharmacy                   Date   INR    Dose  2/17       3.78      hold    Assessment/Plan  (Goal INR: 2 - 3)  Hold warfarin tonight    Active problem list reviewed. INR orders are placed. Chart reviewed for pertinent labs, drug/diet interactions, and past doses. Documentation of patient's clinical condition was reviewed. Pharmacy Dosing:  Pharmacy will continue to follow.

## 2022-02-17 NOTE — CARE COORDINATION
Case Management Assessment  Initial Evaluation      Patient Name: Ashley Alford  YOB: 1950  Diagnosis: Acute pulmonary edema ([facility] [J81.0]  ESRD on dialysis ([facility] [N18.6, Z99.2]  Acute on chronic respiratory failure with hypoxia ([facility] [J96.21]  Pneumonia due to infectious organism, unspecified laterality, unspecified part of lung [J18.9]  Date / Time: 2/17/2022  7:12 AM    Admission status/Date:none  Chart Reviewed: Yes      Patient Interviewed: No   Family Interviewed:  Yes - pt's daughter      Hospitalization in the last 30 days:  No      Health Care Decision Maker :   Primary Decision Maker: [address] [place] [address] [phone]    ( - must 1st enter selection under Navigator - emergency contact- Health Care Decision Maker Relationship and pick relationship)   Who do you trust or have selected to make healthcare decisions for you      Met with: pt's dtr via TC  Interview conducted  (bedside/phone):    Current PCP:   Jonnathan Georges MD      Financial  Commercial  Precert required for SNF : Y, N          3 night stay required - Y, N    ADLS  Support Systems/Care Needs:    Transportation: EMS transportation        DISCHARGE PLAN: Explained Case Management role/services. Reviewed chart. Pt from [address] [place] [address] Rd per daughter Abdulaziz Verde and plan return. Writer left vm for Luz Elena Noland with [facility] r/t bed hold and whether will need pre-cert to return. Awaiting call back from [facility]. Follow.

## 2022-02-17 NOTE — CONSULTS
"[employer]  Nephrology Consult Note           Reason for Consult:  ESRD  Requesting Physician:  Dr. Belle Sharma    Chief Complaint:    Chief Complaint   Patient presents with    Loss of Consciousness     patient reported to have been on her way back from dialysis and went unresponsive. Patient is a trach vent patient and has been on \"spontaneous mode\" x1 week. Nurse states patient's vent said apnea and she started to desat, so she bagged her and brought her up to 99% and then patient woke up and looked at her. History of Present Illness on 2/17/22:    80 y.o. yo female with PMH of ESRD on HD, chronic resp failure on trach/vent since 2/2021, HTN, COVID pneumonia, DVT on coumadin  who is admitted for loss of consciousness  Pt has been t/f to  [facility] from [place] [address] last year as she has been needing HD and is trach/vent  She usually cuts her HD session short and misses HD frequently. She says her hemorrhoids? give a lot of pain while laying on her bed during hD  Today she did dialysis for 1.5h and removed 300 ml only as she asked to come off HD. She became unresponsive when being t/f from HDU to the nursing home and had to bagged for few minutes before she regained consciousness. Apparently she had been on SBT for a week or so but currently is back on the vent.   cxr shows chf and pneumonia    Past Medical History:        Diagnosis Date    Acute and chronic respiratory failure (acute-on-chronic) ([facility])     Anxiety disorder     Atherosclerotic heart disease of native coronary artery without angina pectoris     Cerebral infarction ([facility])     Chronic pain syndrome     Dependence on renal dialysis ([address]     Dependence on respirator ([facility])     End stage renal disease ([facility])     Gastro-esophageal reflux disease with esophagitis, without bleeding     Insomnia     Major depressive disorder, recurrent ([address]     Morbid obesity due to excess calories ([address]     Muscle weakness     Obstructive sleep apnea (adult) (pediatric)     Other hyperlipidemia     Other voice and resonance disorders     Personal history of COVID-19     [facility] hypertension [facility]     Tracheostomy status ([facility]     Type 2 diabetes mellitus without complications ([facility]     Unspecified atrial fibrillation ([facility]        Past Surgical History:    No past surgical history on file. Home Medications:    No current facility-administered medications on file prior to encounter. Current Outpatient Medications on File Prior to Encounter   Medication Sig Dispense Refill    warfarin (COUMADIN) 6 MG tablet Take 6 mg by mouth nightly      insulin glargine (LANTUS) 100 UNIT/ML injection vial Inject 15 Units into the skin daily      aspirin 81 MG EC tablet Take 81 mg by mouth daily      calcium acetate (PHOSLO) 667 MG CAPS capsule Take 667 mg by mouth 3 times daily (before meals)      diphenhydrAMINE (BENADRYL) 25 MG capsule Take 25 mg by mouth every 8 hours      docusate sodium (COLACE) 100 MG capsule Take 100 mg by mouth 2 times daily      lactulose encephalopathy 10 GM/15ML SOLN solution Take 20 g by mouth 3 times daily      polyethylene glycol (GLYCOLAX) 17 g packet Take 17 g by mouth daily as needed for Constipation      insulin NPH (HUMULIN N;NOVOLIN N) 100 UNIT/ML injection vial Inject into the skin 2 times daily (before meals)      HYDROcodone-acetaminophen (NORCO) 5-325 MG per tablet Take 1 tablet by mouth every 6 hours as needed for Pain.       hydrocortisone (ANUSOL-HC) 25 MG suppository Place 25 mg rectally 2 times daily      ipratropium-albuterol (DUONEB) 0.5-2.5 (3) MG/3ML SOLN nebulizer solution Inhale 1 vial into the lungs every 4 hours      cephALEXin (KEFLEX) 500 MG capsule Take 500 mg by mouth 4 times daily      insulin glargine (LANTUS) 100 UNIT/ML injection vial Inject into the skin nightly      furosemide (LASIX) 20 MG tablet Take 20 mg by mouth 2 times daily      loperamide (IMODIUM) 2 MG capsule Take 2 mg by mouth 4 times daily as needed for Diarrhea      melatonin 3 MG TABS tablet Take 3 mg by mouth daily      midodrine (PROAMATINE) 5 MG tablet Take 10 mg by mouth 3 times daily      dextromethorphan-guaiFENesin (MUCINEX DM)  MG per extended release tablet Take 1 tablet by mouth every 12 hours as needed      omeprazole (PRILOSEC) 20 MG delayed release capsule Take 20 mg by mouth daily      chlorhexidine (PERIDEX) 0.12 % solution Take 15 mLs by mouth 2 times daily      sennosides-docusate sodium (SENOKOT-S) 8.6-50 MG tablet Take 1 tablet by mouth daily      sertraline (ZOLOFT) 50 MG tablet Take 50 mg by mouth daily      terazosin (HYTRIN) 1 MG capsule Take 1 mg by mouth nightly      ondansetron (ZOFRAN) 4 MG tablet Take 4 mg by mouth every 8 hours as needed for Nausea or Vomiting         Allergies:  Haloperidol lactate and Ziprasidone hcl    Social History:    Social History     Socioeconomic History    Marital status:      Spouse name: Not on file    Number of children: Not on file    Years of education: Not on file    Highest education level: Not on file   Occupational History    Not on file   Tobacco Use    Smoking status: Former Smoker    Smokeless tobacco: Never Used   Substance and Sexual Activity    Alcohol use: Never    Drug use: Never    Sexual activity: Not on file   Other Topics Concern    Not on file   Social History Narrative    Not on file     Social Determinants of Health     Financial Resource Strain:     Difficulty of Paying Living Expenses: Not on file   Food Insecurity:     Worried About Running Out of Food in the Last Year: Not on file    Volodymyr of Food in the Last Year: Not on file   Transportation Needs:     Lack of Transportation (Medical): Not on file    Lack of Transportation (Non-Medical):  Not on file   Physical Activity:     Days of Exercise per Week: Not on file    Minutes of Exercise per Session: Not on file   Stress:     Feeling of Stress : Not on file Social Connections:     Frequency of Communication with Friends and Family: Not on file    Frequency of Social Gatherings with Friends and Family: Not on file    Attends Yazdanism Services: Not on file    Active Member of Clubs or Organizations: Not on file    Attends Club or Organization Meetings: Not on file    Marital Status: Not on file   Intimate Partner Violence:     Fear of Current or Ex-Partner: Not on file    Emotionally Abused: Not on file    Physically Abused: Not on file    Sexually Abused: Not on file   Housing Stability:     Unable to Pay for Housing in the Last Year: Not on file    Number of [place] in the Last Year: Not on file    Unstable Housing in the Last Year: Not on file       Family History:   No family history on file. Review of Systems:   Pertinent positives stated above in HPI. All other 10 systems were reviewed and were negative.      Physical exam:   Constitutional:  VITALS:  /69   Pulse 100   Temp 98.1 °F (36.7 °C) (Oral)   Resp 17   SpO2 99%   Gen: alert, awake  Cardiovascular:  S1, S2 without m/r/g no lower extremity edema  Respiratory: decreased breath sounds ; trach in situ  Abdomen:  soft, nt, nd,   Neuro/Psy: AAoriented times 3 ; moves all 4 ext    Data/  Recent Labs     02/17/22  0725   WBC 15.1*   HGB 9.7*   HCT 31.8*   MCV 95.0        Recent Labs     02/17/22  0725      K 4.7   CL 95*   CO2 25   GLUCOSE 186*   BUN 70*   CREATININE 6.8*   LABGLOM 6*   GFRAA 7*       Assessment  -ESRD on HD T[facility] at [facility]   Has h/o intra dialytic hypotension and is on midodrine    -Acute on chronic resp failure likely from pneumonia and fluid overload   Currently trach and vent    -Anemia    -leukocytosis/sepsis    -CKD/MBD    -Rectal pain/hemorrhoids    Plan  -HD on 2/18 and then per renee  -follow echo  -pan cultures and broad spectrum abx as ordered  -renal dose meds  -will get gen surgery consult to evaluate for her rectal pain    Thank you for the consultation. Please do not hesitate to call with questions. Pilar Yuen MD  Office: [phone]  Fax:    [phone]  [facility]. [facility]

## 2022-02-17 NOTE — CONSULTS
Patient is being seen at the request of ITALO Cordoba CNP for a consultation for chronic trach on the ventilator    HISTORY OF PRESENT ILLNESS:   80years old with chronic trach/ventilator brought to ED for unresponsiveness on her way back from dialysis. Associated with apnea and hypoxemia improved with bagging. Work-up in ED revealed leukocytosis with right lower lobe airspace disease on chest x-ray. Per report patient has been tolerating spontaneous breathing trial past week on ventilator. VBG in ED showed hypercapnia with a CO2 of 57.8. No vomiting. No hemoptysis. Has had diarrhea for 2 weeks. Intermittent chest pain. Elevated BNP with chest x-ray also suggestive of pulmonary edema. Patient is nonverbal on mechanical ventilation limited to obtain further HPI. PAST MEDICAL HISTORY:  Past Medical History:   Diagnosis Date    Acute and chronic respiratory failure [facility]-on-chronic) [facility]     Anxiety disorder     Atherosclerotic heart disease of native coronary artery without angina pectoris     Cerebral infarction ([facility])     Chronic pain syndrome     Dependence on renal dialysis ([facility])     Dependence on respirator ([facility])     End stage renal disease ([facility])     Gastro-esophageal reflux disease with esophagitis, without bleeding     Insomnia     Major depressive disorder, recurrent ([address]     Morbid obesity due to excess calories ([facility])     Muscle weakness     Obstructive sleep apnea (adult) (pediatric)     Other hyperlipidemia     Other voice and resonance disorders     Personal history of COVID-19     Pulmonary hypertension ([address]     Tracheostomy status ([address]     Type 2 diabetes mellitus without complications ([address]     Unspecified atrial fibrillation ([address]      PAST SURGICAL HISTORY:  No past surgical history on file. FAMILY HISTORY:  Nonverbal on mechanical ventilation unable to obtain    SOCIAL HISTORY:   reports that she has quit smoking.  She has never used smokeless tobacco.    Scheduled Meds:   midodrine  10 mg Oral Once     Continuous Infusions:    Scheduled Meds:   midodrine  10 mg Oral Once       Continuous Infusions:      PRN Meds:        ALLERGIES:  Patient is allergic to haloperidol lactate and ziprasidone hcl. REVIEW OF SYSTEMS: Nonverbal mechanical ventilation number able to obtain      PHYSICAL EXAM:  Blood pressure (!) 103/58, pulse 88, temperature 97.9 °F (36.6 °C), temperature source Axillary, resp. rate 16, SpO2 99 %.' on RA  EXAM:  General: ill appearing. On mechanical ventilation  Eyes: PERRL. No sclera icterus. No conjunctival injection. ENT: No discharge. Pharynx clear. Tracheostomy tube in place  Neck: Trachea midline. Normal thyroid. Resp: No accessory muscle use. No crackles. No wheezing. Bilateral rhonchi. CV: Regular rate. Regular rhythm. No mumur or rub. No edema. GI: Non-tender. Non-distended. No masses. Skin: Warm and dry. No nodule on exposed extremities. No rash on exposed extremities. Lymph: No cervical LAD. No supraclavicular LAD. M/S: No cyanosis. No joint deformity. No clubbing. Neuro: On mechanical ventilation. Following commands. Alert and awake. Psych: No agitation or anxiety. Noncommunicative unable to obtain      LABS:  CBC:   Recent Labs     02/17/22  0725   WBC 15.1*   HGB 9.7*   HCT 31.8*   MCV 95.0        BMP:   Recent Labs     02/17/22  0725      K 4.7   CL 95*   CO2 25   BUN 70*   CREATININE 6.8*     LIVER PROFILE:   Recent Labs     02/17/22  0725   AST 12*   ALT 8*   BILITOT 0.4   ALKPHOS 117     PT/INR: No results for input(s): PROTIME, INR in the last 72 hours. APTT: No results for input(s): APTT in the last 72 hours. UA:No results for input(s): NITRITE, COLORU, PHUR, LABCAST, WBCUA, RBCUA, MUCUS, TRICHOMONAS, YEAST, BACTERIA, CLARITYU, SPECGRAV, LEUKOCYTESUR, UROBILINOGEN, BILIRUBINUR, BLOODU, GLUCOSEU, AMORPHOUS in the last 72 hours.     Invalid input(s): KETONESU  No results for input(s): PHART, LXZ4OXA, PO2ART in the last 72 hours. Chest x-ray 2/17 imaging was reviewed by me and showed   Tracheostomy tube in place  Pulmonary edema  Right lower lobe airspace disease    ASSESSMENT:  · Acute hypoxemic hypercapnic respiratory failure   · Probable RLL HCAP   · Abnormal chest x-ray-CHF and probable pneumonia  · Diarrhea-rule out C. difficile  · Chronic respiratory failure requiring continuous mechanical ventilation through tracheostomy  · ESRD on HD via right arm fistula  · CAD  · History of COVID-19 October 2020  · History of SILVER pulmonary hypertension    PLAN:  Mechanical ventilation as per my orders.  The ventilator was adjusted by me at the bedside for unstable, life threatening respiratory failure-22/400/+530% FiO2  ABG in 2 hours-discussed with RT  Supplemental oxygen to maintain SaO2 >92%; wean as tolerated  Head of bed 30 degrees or higher at all times  Vancomycin and cefepime  Inhaled bronchodilators  Speech pathology  HD per nephrology  Cardiology following  Echocardiogram  DVT prophylaxis: Lovenox

## 2022-02-17 NOTE — PROGRESS NOTES
Speech Language Pathology  Facility/Department: [facility] PCU TELEMETRY   CLINICAL BEDSIDE SWALLOW EVALUATION    NAME: Travon Waters  : 1950  MRN: [id number]    ADMISSION DATE: 2022  ADMITTING DIAGNOSIS: has Chest pain; Unresponsive episode; Syncope; Elevated troponin; Acute respiratory failure with hypoxia and hypercapnia ([address] HCAP (healthcare-associated pneumonia); Abnormal chest x-ray; ESRD on dialysis [facility]); and Chronic respiratory failure requiring continuous mechanical ventilation through tracheostomy [facility]) on their problem list.  ONSET DATE: 2022    Recent Chest Xray/CT of Chest: (2022)  CHF with pulmonary edema and or superimposed pneumonia. Date of Eval: 2022  Evaluating Therapist: ADELSO Eid    Current Diet level:   NPO    CHIEF COMPLAINT per Dr Tree Barrera in the ED:  \"Loss of Consciousness (patient reported to have been on her way back from dialysis and went unresponsive. Patient is a trach vent patient and has been on \"spontaneous mode\" x1 week. Nurse states patient's vent said apnea and she started to desat, so she bagged her and brought her up to 99% and then patient woke up and looked at her. )      HISTORY OF PRESENT ILLNESS  Travon Waters is a 80 y.o. female  who presents to the ED complaining of altered mental status, unresponsiveness. Patient is end-stage renal disease on dialysis, trach and vent dependent at baseline however has been on spontaneous mode for the past week. On the way back from dialysis she reportedly went unresponsive and prevent stated that she was apneic and she started to have desaturation, she was bagged and oxygen saturation was brought up to 99% and patient woke up and looked at her nurse. Upon arrival in the ED, patient is able to follow some simple commands, unable to speak or provide any further history but is alert and looking around the room. She has no lateralizing neurologic deficits.  Is able to mouth answers to questions.     No other complaints, modifying factors or associated symptoms. \"     Pain:  Pt denied pain     Reason for Referral  Janneth Brambila was referred for a bedside swallow evaluation to assess the efficiency of her swallow function, identify signs and symptoms of aspiration and make recommendations regarding safe dietary consistencies, effective compensatory strategies, and safe eating environment. Medical record review/interview: Per conversation with SLP at her care facility, pt receives mechanical soft foods, recently downgraded from regular textures due to concern for aspiration (suspected to be due to rapid self-feeding without adequate completeness of mastication). She received thin liquids. Pt participated in FEES 01/17/2021 with reported pharyngeal dysphagia at that time, though this was prior to tracheostomy placed 02/02/2021. Her PEG was placed 01/21/2021  Predisposing dysphagia risk factors: Chronic Respiratory Failure and Hx of Trach/PEG  Clinical signs of possible chronic dysphagia: hx of PEG/TF, hx of dysphagia and hx of tracheostomy  Precipitating dysphagia risk factors: reduced physical mobility and increased O2 demands, newly dx CHF and pulmonary edema    Vitals:    02/17/22 1354   BP: 125/69   Pulse: 100   Resp: 17   Temp: 98.1 °F (36.7 °C)   SpO2: 99%     Cranial nerve exam:   CN V (trigeminal): ophthalmic, maxillary, and mandibular facial sensation- WNL  CN VII (facial): WNL  CN IX/X (glossopharyngeal/vagus): MPT: AN; pitch range: AN; vocal quality: AN; cough: unable, on full vent support with cuff inflated  CN XII (hypoglossal): AN    Secretions: WFL, oral care status WNL    PO trials: note ventilator alarms after each swallow due to brief apnea to complete each swallow. Pt needed cues to slow her rate of intake. She is unable to phonate, throat clear or cough due to currently inflated cuff and full vent support.  Suspect was tolerating cuff deflation prior to admission as she indicates she was able to phonate. Ice: WFL  IDDSI 0 (thin):   - 5cc bolus (tsp): no anterior bolus loss  and swallow timing subjectively appears timely  - Cup: no anterior bolus loss  and swallow timing subjectively appears timely  - Straw: no anterior bolus loss  and swallow timing subjectively appears timely    IDDSI 4 (puree): no anterior bolus loss  and swallow timing subjectively appears timely  IDDSI 6 (soft and bite sized): no anterior bolus loss , swallow timing subjectively appears timely, grossly functional mastication and oral clearance grossly WFL  3 oz water: FAIL- pt unable to coordinate successive swallows due to ventilator support    Clinical signs of pharyngeal dysphagia likely acute on chronic related to impaired respiratory-swallow coordination, need for pressure support. Swallow prognosis is good. Instrumental swallow study is indicated, when able to arrange this. Given tolerance to PO at bedside, pt is not safe for oral diet at this time    Instrumentation: repeat FEES or MBS is indicated when able to be arranged  Diet recommendation: NPO, pending reassessment with pulmonology for trials of cuff deflation  Risk management: upright for all intake, stay upright for at least 30 mins after intake, small bites/sips, 1:1 supervision with intake, oral care 2-3x/day to reduce adverse affects in the event of aspiration and general aspiration precautions    Impression  Dysphagia Outcome Severity Scale: Level 5: Mild dysphagia- Distant supervision.  May need one diet consistency restricted , concern for residue above the cuff at this time    Treatment Plan  Requires SLP Intervention: Yes  Duration/Frequency of Treatment: 1-2x/wk  D/C Recommendations: SNF     Recommended Diet and Intervention  Diet Solids Recommendation: NPO  Liquid Consistency Recommendation: NPO  Recommended Form of Meds: non-oral  Recommendations: Self feed once PO is indicated  Therapeutic Interventions: Patient/Family education;Diet tolerance monitoring    Compensatory Swallowing Strategies  Compensatory Swallowing Strategies: Small bites/sips;Upright as possible for all oral intake;Remain upright for 30-45 minutes after meals;Eat/Feed slowly    Treatment/Goals  Short-term Goals  Timeframe for Short-term Goals: 5 days (2/22/22)  Long-term Goals  Timeframe for Long-term Goals: 7 days (2/24/22)  Goal 1: Pt will demonstrate clinically safe swallow of soft solids and thin liquids  Dysphagia Goals: The patient will participate in repeat BSE; The patient will tolerate recommended diet without observed clinical signs of aspiration; The patient will recall and perform compensatory strategies, with no cues. Prognosis  Prognosis  Prognosis for safe diet advancement: good  Individuals consulted  Consulted and agree with results and recommendations: Patient;RN    Education  Patient Education: SLP educated the patient re: Role of SLP, rationale for completion of assessment, anatomical components of swallow structures as they pertain to airway protection, results of assessment, recommendations and POC  Patient Education Response: Verbalizes understanding  Safety Devices in place: Yes  Type of devices: Call light within reach; Left in bed;Nurse notified; Bed alarm in place       Therapy Time  SLP Individual Minutes  Time In: 1540  Time Out: 602 [address]  Minutes: 1111 [facility].  [facility] MS CCC-SLP  Speech Language Pathologist   Phone Ivan [address] [place] [address]  2/17/2022 4:14 PM

## 2022-02-17 NOTE — ED NOTES
1122 Routine consult placed to nephrology.      Meaghan Lawton  02/17/22 1123    1126 Dr. Peter Odom called back       Meaghan Lawton  02/17/22 1146

## 2022-02-17 NOTE — PLAN OF CARE
Admit to PCU with continuous pulse ox    AMS, unresponsive episode ? R/T hypoxia after dialysis   Pt has been on spontaneous vent mode last week  Placed on vent in ED- pulmonary consulted   CXR with possible fluid overload vs pneumonia- started on Vanc/cefepime   +leukocytosis   BNP elevated 15,169, chronically elevated troponin, slightly higher than previous  Cardiology consulted       Home medications not verified, will need to be ordered once verified. Nursing communication placed.       Rubi Cardenas, APRN - CNP

## 2022-02-18 LAB
ALBUMIN SERPL-MCNC: 3.1 G/DL (ref 3.4–5)
ANION GAP SERPL CALCULATED.3IONS-SCNC: 17 MMOL/L (ref 3–16)
BUN BLDV-MCNC: 79 MG/DL (ref 7–20)
CALCIUM SERPL-MCNC: 8.7 MG/DL (ref 8.3–10.6)
CHLORIDE BLD-SCNC: 93 MMOL/L (ref 99–110)
CO2: 22 MMOL/L (ref 21–32)
CREAT SERPL-MCNC: 7.8 MG/DL (ref 0.6–1.2)
ESTIMATED AVERAGE GLUCOSE: 108.3 MG/DL
GFR AFRICAN AMERICAN: 6
GFR NON-AFRICAN AMERICAN: 5
GLUCOSE BLD-MCNC: 102 MG/DL (ref 70–99)
GLUCOSE BLD-MCNC: 106 MG/DL (ref 70–99)
GLUCOSE BLD-MCNC: 118 MG/DL (ref 70–99)
GLUCOSE BLD-MCNC: 123 MG/DL (ref 70–99)
HBA1C MFR BLD: 5.4 %
HCT VFR BLD CALC: 28 % (ref 36–48)
HEMOGLOBIN: 8.9 G/DL (ref 12–16)
INR BLD: 4.32 (ref 0.88–1.12)
LV EF: 45 %
LVEF MODALITY: NORMAL
MCH RBC QN AUTO: 29.9 PG (ref 26–34)
MCHC RBC AUTO-ENTMCNC: 31.7 G/DL (ref 31–36)
MCV RBC AUTO: 94.4 FL (ref 80–100)
PDW BLD-RTO: 18.9 % (ref 12.4–15.4)
PERFORMED ON: ABNORMAL
PHOSPHORUS: 5.6 MG/DL (ref 2.5–4.9)
PLATELET # BLD: 250 K/UL (ref 135–450)
PMV BLD AUTO: 7.9 FL (ref 5–10.5)
POTASSIUM SERPL-SCNC: 4.8 MMOL/L (ref 3.5–5.1)
PROTHROMBIN TIME: 51.9 SEC (ref 9.9–12.7)
RBC # BLD: 2.96 M/UL (ref 4–5.2)
SODIUM BLD-SCNC: 132 MMOL/L (ref 136–145)
VANCOMYCIN RANDOM: 18.5 UG/ML
WBC # BLD: 9.4 K/UL (ref 4–11)

## 2022-02-18 PROCEDURE — 80202 ASSAY OF VANCOMYCIN: CPT

## 2022-02-18 PROCEDURE — 6360000002 HC RX W HCPCS: Performed by: NURSE PRACTITIONER

## 2022-02-18 PROCEDURE — 2700000000 HC OXYGEN THERAPY PER DAY

## 2022-02-18 PROCEDURE — 90935 HEMODIALYSIS ONE EVALUATION: CPT

## 2022-02-18 PROCEDURE — 80069 RENAL FUNCTION PANEL: CPT

## 2022-02-18 PROCEDURE — 99233 SBSQ HOSP IP/OBS HIGH 50: CPT | Performed by: INTERNAL MEDICINE

## 2022-02-18 PROCEDURE — 6360000004 HC RX CONTRAST MEDICATION: Performed by: NURSE PRACTITIONER

## 2022-02-18 PROCEDURE — 94761 N-INVAS EAR/PLS OXIMETRY MLT: CPT

## 2022-02-18 PROCEDURE — 5A1D70Z PERFORMANCE OF URINARY FILTRATION, INTERMITTENT, LESS THAN 6 HOURS PER DAY: ICD-10-PCS | Performed by: INTERNAL MEDICINE

## 2022-02-18 PROCEDURE — 6370000000 HC RX 637 (ALT 250 FOR IP): Performed by: INTERNAL MEDICINE

## 2022-02-18 PROCEDURE — 2500000003 HC RX 250 WO HCPCS: Performed by: NURSE PRACTITIONER

## 2022-02-18 PROCEDURE — 36415 COLL VENOUS BLD VENIPUNCTURE: CPT

## 2022-02-18 PROCEDURE — 99222 1ST HOSP IP/OBS MODERATE 55: CPT | Performed by: SURGERY

## 2022-02-18 PROCEDURE — 85027 COMPLETE CBC AUTOMATED: CPT

## 2022-02-18 PROCEDURE — 94003 VENT MGMT INPAT SUBQ DAY: CPT

## 2022-02-18 PROCEDURE — 99232 SBSQ HOSP IP/OBS MODERATE 35: CPT | Performed by: NURSE PRACTITIONER

## 2022-02-18 PROCEDURE — 6370000000 HC RX 637 (ALT 250 FOR IP): Performed by: NURSE PRACTITIONER

## 2022-02-18 PROCEDURE — 94640 AIRWAY INHALATION TREATMENT: CPT

## 2022-02-18 PROCEDURE — 6360000002 HC RX W HCPCS: Performed by: INTERNAL MEDICINE

## 2022-02-18 PROCEDURE — 85610 PROTHROMBIN TIME: CPT

## 2022-02-18 PROCEDURE — 2060000000 HC ICU INTERMEDIATE R&B

## 2022-02-18 PROCEDURE — 2580000003 HC RX 258: Performed by: NURSE PRACTITIONER

## 2022-02-18 PROCEDURE — C8929 TTE W OR WO FOL WCON,DOPPLER: HCPCS

## 2022-02-18 PROCEDURE — 99232 SBSQ HOSP IP/OBS MODERATE 35: CPT | Performed by: INTERNAL MEDICINE

## 2022-02-18 RX ORDER — LORAZEPAM 2 MG/ML
0.5 INJECTION INTRAMUSCULAR 2 TIMES DAILY
Status: DISCONTINUED | OUTPATIENT
Start: 2022-02-18 | End: 2022-02-21 | Stop reason: HOSPADM

## 2022-02-18 RX ORDER — MORPHINE SULFATE 2 MG/ML
2 INJECTION, SOLUTION INTRAMUSCULAR; INTRAVENOUS EVERY 6 HOURS PRN
Status: DISCONTINUED | OUTPATIENT
Start: 2022-02-18 | End: 2022-02-21 | Stop reason: HOSPADM

## 2022-02-18 RX ORDER — METOPROLOL SUCCINATE 25 MG/1
12.5 TABLET, EXTENDED RELEASE ORAL DAILY
Status: DISCONTINUED | OUTPATIENT
Start: 2022-02-18 | End: 2022-02-21 | Stop reason: HOSPADM

## 2022-02-18 RX ORDER — DOXERCALCIFEROL 2 UG/ML
6 INJECTION, SOLUTION INTRAVENOUS
Status: DISCONTINUED | OUTPATIENT
Start: 2022-02-18 | End: 2022-02-21 | Stop reason: HOSPADM

## 2022-02-18 RX ADMIN — IPRATROPIUM BROMIDE AND ALBUTEROL SULFATE 1 AMPULE: 2.5; .5 SOLUTION RESPIRATORY (INHALATION) at 15:37

## 2022-02-18 RX ADMIN — HYDROCODONE BITARTRATE AND ACETAMINOPHEN 1 TABLET: 5; 325 TABLET ORAL at 16:49

## 2022-02-18 RX ADMIN — PERFLUTREN 1.65 MG: 6.52 INJECTION, SUSPENSION INTRAVENOUS at 09:21

## 2022-02-18 RX ADMIN — CEFEPIME HYDROCHLORIDE 1000 MG: 1 INJECTION, POWDER, FOR SOLUTION INTRAMUSCULAR; INTRAVENOUS at 17:44

## 2022-02-18 RX ADMIN — IPRATROPIUM BROMIDE AND ALBUTEROL SULFATE 1 AMPULE: 2.5; .5 SOLUTION RESPIRATORY (INHALATION) at 07:59

## 2022-02-18 RX ADMIN — IPRATROPIUM BROMIDE AND ALBUTEROL SULFATE 1 AMPULE: 2.5; .5 SOLUTION RESPIRATORY (INHALATION) at 03:07

## 2022-02-18 RX ADMIN — HEPARIN SODIUM 5000 UNITS: 5000 INJECTION INTRAVENOUS; SUBCUTANEOUS at 16:48

## 2022-02-18 RX ADMIN — HEPARIN SODIUM 5000 UNITS: 5000 INJECTION INTRAVENOUS; SUBCUTANEOUS at 20:21

## 2022-02-18 RX ADMIN — HYDROCORTISONE ACETATE 25 MG: 25 SUPPOSITORY RECTAL at 20:21

## 2022-02-18 RX ADMIN — ALPRAZOLAM 0.5 MG: 0.5 TABLET ORAL at 16:48

## 2022-02-18 RX ADMIN — LORAZEPAM 0.5 MG: 2 INJECTION INTRAMUSCULAR; INTRAVENOUS at 20:22

## 2022-02-18 RX ADMIN — HYDROCORTISONE: 1 CREAM TOPICAL at 20:21

## 2022-02-18 RX ADMIN — CALCIUM ACETATE 1334 MG: 667 CAPSULE ORAL at 16:48

## 2022-02-18 RX ADMIN — VANCOMYCIN HYDROCHLORIDE 750 MG: 750 INJECTION, POWDER, LYOPHILIZED, FOR SOLUTION INTRAVENOUS at 15:15

## 2022-02-18 RX ADMIN — DOXERCALCIFEROL 6 MCG: 4 INJECTION, SOLUTION INTRAVENOUS at 12:59

## 2022-02-18 RX ADMIN — ONDANSETRON HYDROCHLORIDE 4 MG: 2 INJECTION, SOLUTION INTRAMUSCULAR; INTRAVENOUS at 05:54

## 2022-02-18 RX ADMIN — IPRATROPIUM BROMIDE AND ALBUTEROL SULFATE 1 AMPULE: 2.5; .5 SOLUTION RESPIRATORY (INHALATION) at 19:58

## 2022-02-18 RX ADMIN — HYDROCODONE BITARTRATE AND ACETAMINOPHEN 1 TABLET: 5; 325 TABLET ORAL at 06:27

## 2022-02-18 RX ADMIN — IPRATROPIUM BROMIDE AND ALBUTEROL SULFATE 1 AMPULE: 2.5; .5 SOLUTION RESPIRATORY (INHALATION) at 23:39

## 2022-02-18 RX ADMIN — EPOETIN ALFA-EPBX 16000 UNITS: 10000 INJECTION, SOLUTION INTRAVENOUS; SUBCUTANEOUS at 13:00

## 2022-02-18 RX ADMIN — HEPARIN SODIUM 5000 UNITS: 5000 INJECTION INTRAVENOUS; SUBCUTANEOUS at 05:56

## 2022-02-18 RX ADMIN — ONDANSETRON HYDROCHLORIDE 4 MG: 2 INJECTION, SOLUTION INTRAMUSCULAR; INTRAVENOUS at 16:46

## 2022-02-18 RX ADMIN — Medication 10 ML: at 20:22

## 2022-02-18 RX ADMIN — CHLORHEXIDINE GLUCONATE 0.12% ORAL RINSE 15 ML: 1.2 LIQUID ORAL at 20:21

## 2022-02-18 ASSESSMENT — PAIN SCALES - GENERAL
PAINLEVEL_OUTOF10: 6
PAINLEVEL_OUTOF10: 10
PAINLEVEL_OUTOF10: 6

## 2022-02-18 ASSESSMENT — PAIN DESCRIPTION - PAIN TYPE: TYPE: ACUTE PAIN

## 2022-02-18 ASSESSMENT — PULMONARY FUNCTION TESTS
PIF_VALUE: 26
PIF_VALUE: 21
PIF_VALUE: 23
PIF_VALUE: 30
PIF_VALUE: 23

## 2022-02-18 ASSESSMENT — PAIN DESCRIPTION - LOCATION: LOCATION: BUTTOCKS

## 2022-02-18 ASSESSMENT — ENCOUNTER SYMPTOMS: RESPIRATORY NEGATIVE: 1

## 2022-02-18 NOTE — PROGRESS NOTES
02/17/22 2026   Vent Information   Vent Type 840   Vent Mode AC/VC   Vt Ordered 355 mL   Rate Set 22 bmp   Peak Flow 50 L/min   FiO2  30 %   Sensitivity 3   PEEP/CPAP 5   Humidification Source Heated wire   Humidification Temp 37   Humidification Temp Measured 36.9   Circuit Condensation Drained   Vent Patient Data   Peak Inspiratory Pressure 45 cmH2O   Mean Airway Pressure 17 cmH20   Rate Measured 24 br/min   Vt Exhaled 410 mL   Minute Volume 8.61 Liters   I:E Ratio 1:2.5   Plateau Pressure 24 LAW52   Static Compliance 22 mL/cmH2O   Dynamic Compliance 10 mL/cmH2O   Cough/Sputum   Sputum How Obtained Suctioned;Tracheal   Sputum Amount None   Breath Sounds   Right Upper Lobe Diminished   Right Middle Lobe Diminished   Right Lower Lobe Diminished   Left Upper Lobe Diminished   Left Lower Lobe Diminished   Alarm Settings   High Pressure Alarm 55 cmH2O   Low Minute Volume Alarm 2.5 L/min   Apnea (secs) 20 secs   High Respiratory Rate 45 br/min   Low Exhaled Vt  255 mL   Patient Observation   Observations #6 Shiley XLT Distal   Surgical Airway (trach) Shiley Cuffed   Placement Date/Time: 02/17/22 6307   Placed By:  In place on arrival to facility  Surgical Airway Type: Tracheostomy  Brand: Marizol  Style: Cuffed  Size (mm): 6  Comments: 6 Shiley XLT DISTAL   Site Assessment Clean;Dry

## 2022-02-18 NOTE — CONSULTS
Surgery Consult Note     Roxanne Loredo, APRN - CNP, CNP  Pt Name: Giuliano Calderon  MRN: [id number]  YOB: 1950  Date of evaluation: 2/18/2022  Primary Care Physician: Rosalie Villalobos MD  Patient evaluated at the request of  Dr. Chucky Reza  Reason for evaluation: Rectal pain  IMPRESSIONS:   1. Rectal exam: no external hemorrhoids noted, no blood, pt with pain with digital insertion  2. Morbidly obese  3. Trach/PEG: on vent  4. ESRD: ON HD  5. A fib/hx of DVT on Coumadin  6. DM II  7. Hx of cerebellar CVA  8. HX of COVID with 6/29/20,  9. Acute on chronic respiratory failure with prolonged and failed extubation: s/p trach 2/2/21, dysphagia requiring PEG 1/21/21  10. Pt reports rectal pain has been going on for 10 years (she held up fingers)  11. Pt had colonoscopy 8/29/17 at [facility] in [place] with Dr. Fifi Georges: colonoscopy to hepatic flexure revealed grade I internal hemorrhoids and a few scattered diverticula, ascending colon not reached due to redundant colon, looping and body habitus. (colonoscopy completed for anemia). 12. does not have any pertinent problems on file. PLANS:   1. No source of rectal pain externally, pt would benefit from GI consultation for possible scope. 2. Continue stool softeners. 3. Pt should get OOB/PT/OT  4. Pt did not appear to have any rectal pain with sitting, only with digital insertion for rectal examination. Pt seen and examined with Dr. Gigi Hernandez, no surgical plans     SUBJECTIVE:   History of Chief Complaint:  From a chart review, pt unable to provide much history. No family at bedside. Giuliano Calderon is a 80 y.o. female who presented to the ER from HD with loss of consciousness. She was admitted with acute on chronic respiratory failure and acute pulmonary edema. The patient has an extensive medical history. While she is here, we have been asked to evaluate the patient's rectal pain.  The patient reports she has had rectal pain for (held up ten fingers) 10 years. Per a chart review, she has a hx of constipation. She is a chronic trach and PEG. She is morbidly obese. Past Medical History   has a past medical history of Acute and chronic respiratory failure (acute-on-chronic) ([facility] [address] Anxiety disorder, Atherosclerotic heart disease of native coronary artery without angina pectoris, Cerebral infarction [facility]), Chronic pain syndrome, Dependence on renal dialysis ([facility] [address] Dependence on respirator ([facility] [address] End stage renal disease ([address] Gastro-esophageal reflux disease with esophagitis, without bleeding, Insomnia, Major depressive disorder, recurrent ([address] Morbid obesity due to excess calories ([address] Muscle weakness, Obstructive sleep apnea (adult) (pediatric), Other hyperlipidemia, Other voice and resonance disorders, Personal history of COVID-19, Pulmonary hypertension ([facility] [address] Tracheostomy status ([facility] [address] [facility] Type 2 diabetes mellitus without complications ([facility] [address] and Unspecified atrial fibrillation ([facility] Past Surgical History   has no past surgical history on file. Medications  Prior to Admission medications    Medication Sig Start Date End Date Taking? Authorizing Provider   warfarin (COUMADIN) 6 MG tablet Take 6 mg by mouth nightly 10/10/21  Yes Historical Provider, MD   midodrine (PROAMATINE) 5 MG tablet Take 10 mg by mouth three times a week Tues Thurs Sat- mid dialysis treatment   Yes Historical Provider, MD   cyanocobalamin 1000 MCG tablet Take 500 mcg by mouth daily   Yes Historical Provider, MD   QUEtiapine (SEROQUEL) 25 MG tablet Take 12.5 mg by mouth nightly   Yes Historical Provider, MD   ALPRAZolam (XANAX) 0.5 MG tablet Take 0.5 mg by mouth in the morning and at bedtime.    Yes Historical Provider, MD   sodium polystyrene (KAYEXALATE) 15 GM/60ML suspension Take 15 g by mouth 4 times daily Sun, Mon, Wed, Fri   Yes Historical Provider, MD   calcium acetate 667 MG TABS Take 1,334 mg by mouth 3 times daily (with meals)   Yes Historical Provider, MD hydrocortisone (PREPARATION H) 1 % cream Apply topically 2 times daily Apply topically 2 times daily. Yes Historical Provider, MD   acetaminophen (TYLENOL) 325 MG tablet Take 650 mg by mouth every 6 hours as needed for Pain   Yes Historical Provider, MD   atorvastatin (LIPITOR) 80 MG tablet Take 80 mg by mouth at bedtime   Yes Historical Provider, MD   insulin glargine (LANTUS) 100 UNIT/ML injection vial Inject 15 Units into the skin daily    Historical Provider, MD   aspirin 81 MG EC tablet Take 81 mg by mouth daily    Historical Provider, MD   diphenhydrAMINE (BENADRYL) 25 MG capsule Take 25 mg by mouth every 8 hours as needed     Historical Provider, MD   docusate sodium (COLACE) 100 MG capsule Take 100 mg by mouth 2 times daily    Historical Provider, MD   lactulose encephalopathy 10 GM/15ML SOLN solution Take 20 g by mouth daily as needed     Historical Provider, MD   polyethylene glycol (GLYCOLAX) 17 g packet Take 17 g by mouth daily as needed for Constipation    Historical Provider, MD   insulin NPH (HUMULIN N;NOVOLIN N) 100 UNIT/ML injection vial Inject into the skin every 6 hours     Historical Provider, MD   HYDROcodone-acetaminophen (NORCO) 5-325 MG per tablet Take 1 tablet by mouth every 12 hours as needed for Pain.      Historical Provider, MD   hydrocortisone (ANUSOL-HC) 25 MG suppository Place 25 mg rectally 2 times daily    Historical Provider, MD   ipratropium-albuterol (DUONEB) 0.5-2.5 (3) MG/3ML SOLN nebulizer solution Inhale 1 vial into the lungs every 4 hours    Historical Provider, MD   furosemide (LASIX) 20 MG tablet Take 20 mg by mouth three times a week MW    Historical Provider, MD   loperamide (IMODIUM) 2 MG capsule Take 2 mg by mouth 4 times daily as needed for Diarrhea    Historical Provider, MD   midodrine (PROAMATINE) 5 MG tablet Take 10 mg by mouth three times a week Tues thurs sat pre dialysis    Historical Provider, MD   dextromethorphan-guaiFENesin ([address] DM)  MG per extended release tablet Take 1 tablet by mouth 2 times daily     Historical Provider, MD   omeprazole (PRILOSEC) 20 MG delayed release capsule Take 20 mg by mouth daily    Historical Provider, MD   chlorhexidine (PERIDEX) 0.12 % solution Take 15 mLs by mouth 2 times daily    Historical Provider, MD   sennosides-docusate sodium (SENOKOT-S) 8.6-50 MG tablet Take 2 tablets by mouth at bedtime     Historical Provider, MD   sertraline (ZOLOFT) 50 MG tablet Take 50 mg by mouth daily    Historical Provider, MD   terazosin (HYTRIN) 1 MG capsule Take 1 mg by mouth nightly    Historical Provider, MD   ondansetron (ZOFRAN) 4 MG tablet Take 4 mg by mouth every 8 hours as needed for Nausea or Vomiting    Historical Provider, MD    Scheduled Meds:   epoetin claire-epbx  16,000 Units IntraVENous Once per day on Tue Thu Sat    doxercalciferol  6 mcg IntraVENous Once per day on Tue Thu Sat    metoprolol succinate  12.5 mg Oral Daily    sodium chloride flush  5-40 mL IntraVENous 2 times per day    cefepime  1,000 mg IntraVENous Q24H    heparin (porcine)  5,000 Units SubCUTAneous 3 times per day    insulin lispro  0-12 Units SubCUTAneous TID WC    insulin lispro  0-6 Units SubCUTAneous Nightly    warfarin placeholder: dosing by pharmacy   Other RX Placeholder    ALPRAZolam  0.5 mg Oral BID    aspirin  81 mg Oral Daily    calcium acetate  1,334 mg Oral TID WC    chlorhexidine  15 mL Mouth/Throat BID    cyanocobalamin  500 mcg Oral Daily    dextromethorphan-guaiFENesin  1 tablet Oral BID    docusate sodium  100 mg Oral BID    furosemide  20 mg Oral Once per day on Mon Wed Fri    hydrocortisone  25 mg Rectal BID    hydrocortisone   Topical BID    insulin glargine  15 Units SubCUTAneous Daily    midodrine  10 mg Oral Once per day on Mon Wed Fri    pantoprazole  40 mg Oral QAM AC    QUEtiapine  12.5 mg Oral Nightly    sennosides-docusate sodium  2 tablet Oral Nightly    sertraline  50 mg Oral Daily    [START ON 2/20/2022] sodium polystyrene  15 g Oral Once per day on Sun Mon Wed Fri    doxazosin  1 mg Oral Nightly    atorvastatin  80 mg Oral Nightly    ipratropium-albuterol  1 ampule Inhalation Q4H     Continuous Infusions:   sodium chloride      dextrose       PRN Meds:.sodium chloride flush, sodium chloride, ondansetron **OR** ondansetron, polyethylene glycol, glucose, glucagon (rDNA), dextrose, dextrose bolus (hypoglycemia) **OR** dextrose bolus (hypoglycemia), diphenhydrAMINE, HYDROcodone-acetaminophen, lactulose, midodrine, acetaminophen **OR** acetaminophen    Allergies  is allergic to haloperidol lactate and ziprasidone hcl. Family History  family history is not on file. Social History   reports that she has quit smoking. She has never used smokeless tobacco. She reports that she does not drink alcohol and does not use drugs. Review of Systems: Pt is unable to provide information. OBJECTIVE:   VITALS:  height is 5' 4\" (1.626 m) and weight is 306 lb 14.1 oz (139.2 kg) (abnormal). Her oral temperature is 99 °F (37.2 °C). Her blood pressure is 147/78 (abnormal) and her pulse is 92. Her respiration is 20 and oxygen saturation is 100%. CONSTITUTIONAL: Awake and alert, banging hands on rails and requesting things. SKIN: Skin color, texture, turgor normal. No rashes or lesions. HEENT: Head is normocephalic, atraumatic. EOMI, PERRLA. NECK: supple, symmetrical, tracheostomy in place  CHEST/LUNGS: chest symmetric with normal A/P diameter, normal respiratory rate and rhythm without wheezes, rales or rhonchi. No accessory muscle use  CARDIOVASCULAR: Heart regular rate and rhythm   ABDOMEN: obese  Tenderness: absent. RECTAL: negative without mass or lesions, no blood, + tenderness  NEUROLOGIC: Awake and alert. She responded to some things by mouthing words or shaking head. EXTREMITIES: no cyanosis.     LABS:     Recent Labs     02/17/22  0725 02/18/22  0803   WBC 15.1* 9.4   HGB 9.7* 8.9*   HCT 31.8* 28.0*    250    132*   K 4.7 4.8   CL 95* 93*   CO2 25 22   BUN 70* 79*   CREATININE 6.8* 7.8*   PHOS  --  5.6*   CALCIUM 8.9 8.7   INR 3.78* 4.32*   AST 12*  --    ALT 8*  --    BILITOT 0.4  --      Recent Labs     02/17/22  0725 02/17/22  0725 02/18/22  0803   ALKPHOS 117  --   --    ALT 8*  --   --    AST 12*  --   --    BILITOT 0.4  --   --    LABALBU 3.5   < > 3.1*    < > = values in this interval not displayed. CBC with Differential:    Lab Results   Component Value Date    WBC 9.4 02/18/2022    RBC 2.96 02/18/2022    HGB 8.9 02/18/2022    HCT 28.0 02/18/2022     02/18/2022    MCV 94.4 02/18/2022    MCH 29.9 02/18/2022    MCHC 31.7 02/18/2022    RDW 18.9 02/18/2022    LYMPHOPCT 7.7 02/17/2022    MONOPCT 3.1 02/17/2022    BASOPCT 0.3 02/17/2022    MONOSABS 0.5 02/17/2022    LYMPHSABS 1.2 02/17/2022    EOSABS 0.3 02/17/2022    BASOSABS 0.0 02/17/2022     CMP:    Lab Results   Component Value Date     02/18/2022    K 4.8 02/18/2022    K 4.7 02/17/2022    CL 93 02/18/2022    CO2 22 02/18/2022    BUN 79 02/18/2022    CREATININE 7.8 02/18/2022    GFRAA 6 02/18/2022    AGRATIO 0.7 02/17/2022    LABGLOM 5 02/18/2022    GLUCOSE 106 02/18/2022    PROT 8.4 02/17/2022    LABALBU 3.1 02/18/2022    CALCIUM 8.7 02/18/2022    BILITOT 0.4 02/17/2022    ALKPHOS 117 02/17/2022    AST 12 02/17/2022    ALT 8 02/17/2022       Thank you for the interesting evaluation. Further recommendations to follow.       Electronically signed by ITALO Hogan CNP on 2/18/2022 at 4:17 PM

## 2022-02-18 NOTE — PROGRESS NOTES
This RN spoke with Md regarding PO meds and diet. MD states have RT uncuff trach and allow pt to eat. PO med with applesauce.

## 2022-02-18 NOTE — FLOWSHEET NOTE
02/18/22 1415   Vital Signs   Temp 99 °F (37.2 °C)   Temp Source Oral   Pulse 92   Heart Rate Source Monitor   Resp 20   BP (!) 147/78   BP Location Left upper arm   Patient Position Semi fowlers   Level of Consciousness Alert (0)   MEWS Score 1   Oxygen Therapy   SpO2 100 %   Pulse Oximeter Device Mode Continuous   Pulse Oximeter Device Location Finger   O2 Device Ventilator   FiO2  30 %     Patient returned from HD in stable condition.

## 2022-02-18 NOTE — PROGRESS NOTES
02/17/22 2026   Vent Information   Vent Type 840   Vent Mode AC/VC   Vt Ordered 355 mL   Rate Set 22 bmp   Peak Flow 50 L/min   FiO2  30 %   Sensitivity 3   PEEP/CPAP 5   Humidification Source Heated wire   Humidification Temp 37   Humidification Temp Measured 36.9   Circuit Condensation Drained   Vent Patient Data   Peak Inspiratory Pressure 45 cmH2O   Mean Airway Pressure 17 cmH20   Rate Measured 24 br/min   Vt Exhaled 410 mL   Minute Volume 8.61 Liters   I:E Ratio 1:2.5   Plateau Pressure 24 HOC69   Static Compliance 22 mL/cmH2O   Dynamic Compliance 10 mL/cmH2O   Cough/Sputum   Sputum How Obtained Suctioned;Tracheal   Sputum Amount None   Breath Sounds   Right Upper Lobe Diminished   Right Middle Lobe Diminished   Right Lower Lobe Diminished   Left Upper Lobe Diminished   Left Lower Lobe Diminished   Alarm Settings   High Pressure Alarm 55 cmH2O   Low Minute Volume Alarm 2.5 L/min   Apnea (secs) 20 secs   High Respiratory Rate 45 br/min   Low Exhaled Vt  255 mL   Patient Observation   Observations #6 Shiley XLT Distal   Surgical Airway (trach) Shiley Cuffed   Placement Date/Time: 02/17/22 2981   Placed By:  In place on arrival to facility  Surgical Airway Type: Tracheostomy  Brand: Marizol  Style: Cuffed  Size (mm): 6  Comments: 6 Shiley XLT DISTAL   Site Assessment Clean;Dry

## 2022-02-18 NOTE — PROGRESS NOTES
RT at bedside and uncuffed the trach and bedside swallow eval was done pt initial was ok and then 1/2 way through dinner started coughing up bright red blood through her trach. RT suggested CXR - perfect serve sent to MD and gave update in the situation and MD states it was not necessary. MD Did place orders to remain NPO and re inflate cuff and orders for IV medications - see MAR. Pt is upset regarding not being able to talk now nor eat meals but does understand.

## 2022-02-18 NOTE — PROGRESS NOTES
Pulmonary Progress Note    CC: Mechanical ventilation    Subjective:   Appears comfortable  No fever  30% FiO2    IV line: Peripheral IVs      Intake/Output Summary (Last 24 hours) at 2/18/2022 0752  Last data filed at 2/18/2022 6960  Gross per 24 hour   Intake 339.42 ml   Output 0 ml   Net 339.42 ml       Exam:   /78   Pulse 95   Temp 98.1 °F (36.7 °C) (Oral)   Resp 25   Wt (!) 316 lb 14.4 oz (143.7 kg)   SpO2 100%  on 30%  General: ill appearing. On mechanical ventilation  Eyes: PERRL. No sclera icterus. No conjunctival injection. ENT: No discharge. Pharynx clear. Tracheostomy tube in place  Neck: Trachea midline. Normal thyroid. Resp: No accessory muscle use. No crackles. No wheezing. Few rhonchi. CV: Regular rate. Regular rhythm. No mumur or rub. No edema. GI: Non-tender. Non-distended. No masses. Skin: Warm and dry. No nodule on exposed extremities. No rash on exposed extremities. Lymph: No cervical LAD. No supraclavicular LAD. M/S: No cyanosis. No joint deformity. No clubbing. Neuro: On mechanical ventilation. Following commands. Alert and awake. Psych: No agitation or anxiety.   Noncommunicative unable to obtain    Scheduled Meds:   sodium chloride flush  5-40 mL IntraVENous 2 times per day    cefepime  1,000 mg IntraVENous Q24H    heparin (porcine)  5,000 Units SubCUTAneous 3 times per day    insulin lispro  0-12 Units SubCUTAneous TID     insulin lispro  0-6 Units SubCUTAneous Nightly    warfarin placeholder: dosing by pharmacy   Other RX Placeholder    vancomycin (VANCOCIN) intermittent dosing (placeholder)   Other RX Placeholder    ALPRAZolam  0.5 mg Oral BID    aspirin  81 mg Oral Daily    calcium acetate  1,334 mg Oral TID     chlorhexidine  15 mL Mouth/Throat BID    cyanocobalamin  500 mcg Oral Daily    dextromethorphan-guaiFENesin  1 tablet Oral BID    docusate sodium  100 mg Oral BID    furosemide  20 mg Oral Once per day on Mon Wed Fri    hydrocortisone  25 mg Rectal BID    hydrocortisone   Topical BID    insulin glargine  15 Units SubCUTAneous Daily    midodrine  10 mg Oral Once per day on Mon Wed Fri    pantoprazole  40 mg Oral QAM AC    QUEtiapine  12.5 mg Oral Nightly    sennosides-docusate sodium  2 tablet Oral Nightly    sertraline  50 mg Oral Daily    [START ON 2/20/2022] sodium polystyrene  15 g Oral Once per day on Sun Mon Wed Fri    doxazosin  1 mg Oral Nightly    atorvastatin  80 mg Oral Nightly    ipratropium-albuterol  1 ampule Inhalation Q4H     Continuous Infusions:   sodium chloride      dextrose       PRN Meds:  sodium chloride flush, sodium chloride, ondansetron **OR** ondansetron, polyethylene glycol, perflutren lipid microspheres, glucose, glucagon (rDNA), dextrose, dextrose bolus (hypoglycemia) **OR** dextrose bolus (hypoglycemia), diphenhydrAMINE, HYDROcodone-acetaminophen, lactulose, midodrine, acetaminophen **OR** acetaminophen    Labs:  CBC:   Recent Labs     02/17/22  0725   WBC 15.1*   HGB 9.7*   HCT 31.8*   MCV 95.0        BMP:   Recent Labs     02/17/22  0725      K 4.7   CL 95*   CO2 25   BUN 70*   CREATININE 6.8*     LIVER PROFILE:   Recent Labs     02/17/22  0725   AST 12*   ALT 8*   BILITOT 0.4   ALKPHOS 117     PT/INR:   Recent Labs     02/17/22  0725   PROTIME 45.2*   INR 3.78*     APTT: No results for input(s): APTT in the last 72 hours. UA:No results for input(s): NITRITE, COLORU, PHUR, LABCAST, WBCUA, RBCUA, MUCUS, TRICHOMONAS, YEAST, BACTERIA, CLARITYU, SPECGRAV, LEUKOCYTESUR, UROBILINOGEN, BILIRUBINUR, BLOODU, GLUCOSEU, AMORPHOUS in the last 72 hours.     Invalid input(s): Marilin Jean  Recent Labs     02/17/22  1703   PHART 7.382   IOS6MTU 45.2*   PO2ART 136.4*       Cultures:   2/17 BC NGTD  2/17 respiratory Proteus and Acinetobacter  2/17 COVID-19 not detected    Films:  Chest x-ray 2/17 imaging was reviewed by me and showed   Tracheostomy tube in place  Pulmonary edema  Right lower lobe airspace disease       Echocardiogram 2/18  Definity contrast administered. Left ventricular systolic function is mildly reduced with visually estimated   EF of 45%. Endocardium not entirely well visualized but no obvious segmental wall   motion abnormalities. Diastolic dysfunction grade and filling pressure are indeterminate. Mild concentric left ventricular hypertrophy. Unable to obtain SPAP due to lack of tricuspid regurgitation. Valves are not well visualized but there are no obvious structural   abnormalities or color flow abnormalities seen on doppler. ASSESSMENT:  · Acute hypoxemic hypercapnic respiratory failure   · Probable RLL HCAP   · Abnormal chest x-ray-CHF and probable pneumonia  · Diarrhea-rule out C. difficile  · Chronic respiratory failure requiring continuous mechanical ventilation through tracheostomy  · Systolic CHF-EF 28%  · ESRD on HD via right arm fistula  · CAD  · History of COVID-19 October 2020  · History of SILVER pulmonary hypertension     PLAN:  · Mechanical ventilation as per my orders.    · Change to nursing home setting 14/450/+5 30% FiO2   · supplemental oxygen to maintain SaO2 >92%; wean as tolerated  · Head of bed 30 degrees or higher at all times  · Vancomycin and cefepime day #2--DC vancomycin  · Follow-up cultures  · Inhaled bronchodilators  · Speech pathology  · HD per nephrology  · Cardiology following  · Coumadin pharmacy to dose

## 2022-02-18 NOTE — PROGRESS NOTES
Progress Note    Admit Date:  2/17/2022      Subjective:  Ms. Latoya Donovan seen post HD< tolerated HD with no issues . Had 4 L removal with no issues    Reports she is hungry and tolerates oral diet  No fevers  Baseline vent settings     Objective:   Patient Vitals for the past 4 hrs:   BP Temp Pulse Resp Height Weight   02/18/22 1327 -- -- -- -- 5' 4\" (1.626 m) --   02/18/22 1325 (!) 123/55 98.6 °F (37 °C) 88 20 -- (!) 306 lb 14.1 oz (139.2 kg)            Intake/Output Summary (Last 24 hours) at 2/18/2022 1413  Last data filed at 2/18/2022 1325  Gross per 24 hour   Intake 739.42 ml   Output 4900 ml   Net -4160.58 ml       Physical Exam:    Gen:  obese AA female on vent, fully awake, alert and oriented . Nonverbal. Morbidly obese. Eyes: PERRL. No sclera icterus. No conjunctival injection. ENT: No discharge. Trachea midline. Tracheostomy vent in place. Resp: No accessory muscle use. No crackles. No wheezes. Diminished in bases  CV: Regular rate. Regular rhythm. No murmur. No rub. No edema. Capillary Refill: Brisk,< 3 seconds   Peripheral Pulses: +2 palpable, equal bilaterally   GI: morbidly obese, Non-tender. Non-distended. No masses. No organomegaly. Normal bowel sounds. No hernia. Skin: Warm and dry. No nodule on exposed extremities. No rash on exposed extremities. M/S: atrophic lower ext ,  No joint deformity. No clubbing. Neuro: Awake oriented .  Grossly nonfocal    Pt is non ambulatory     Scheduled Meds:   epoetin claire-epbx  16,000 Units IntraVENous Once per day on Tue Thu Sat    doxercalciferol  6 mcg IntraVENous Once per day on Tue Thu Sat    vancomycin  750 mg IntraVENous Once    metoprolol succinate  12.5 mg Oral Daily    sodium chloride flush  5-40 mL IntraVENous 2 times per day    cefepime  1,000 mg IntraVENous Q24H    heparin (porcine)  5,000 Units SubCUTAneous 3 times per day    insulin lispro  0-12 Units SubCUTAneous TID WC    insulin lispro  0-6 Units SubCUTAneous Nightly    warfarin placeholder: dosing by pharmacy   Other RX Placeholder    ALPRAZolam  0.5 mg Oral BID    aspirin  81 mg Oral Daily    calcium acetate  1,334 mg Oral TID WC    chlorhexidine  15 mL Mouth/Throat BID    cyanocobalamin  500 mcg Oral Daily    dextromethorphan-guaiFENesin  1 tablet Oral BID    docusate sodium  100 mg Oral BID    furosemide  20 mg Oral Once per day on Mon Wed Fri    hydrocortisone  25 mg Rectal BID    hydrocortisone   Topical BID    insulin glargine  15 Units SubCUTAneous Daily    midodrine  10 mg Oral Once per day on Mon Wed Fri    pantoprazole  40 mg Oral QAM AC    QUEtiapine  12.5 mg Oral Nightly    sennosides-docusate sodium  2 tablet Oral Nightly    sertraline  50 mg Oral Daily    [START ON 2/20/2022] sodium polystyrene  15 g Oral Once per day on Sun Mon Wed Fri    doxazosin  1 mg Oral Nightly    atorvastatin  80 mg Oral Nightly    ipratropium-albuterol  1 ampule Inhalation Q4H       Continuous Infusions:   sodium chloride      dextrose         PRN Meds:  sodium chloride flush, sodium chloride, ondansetron **OR** ondansetron, polyethylene glycol, glucose, glucagon (rDNA), dextrose, dextrose bolus (hypoglycemia) **OR** dextrose bolus (hypoglycemia), diphenhydrAMINE, HYDROcodone-acetaminophen, lactulose, midodrine, acetaminophen **OR** acetaminophen      Data:  CBC:   Recent Labs     02/17/22 0725 02/18/22  0803   WBC 15.1* 9.4   HGB 9.7* 8.9*   HCT 31.8* 28.0*   MCV 95.0 94.4    250     BMP:   Recent Labs     02/17/22  0725 02/18/22  0803    132*   K 4.7 4.8   CL 95* 93*   CO2 25 22   PHOS  --  5.6*   BUN 70* 79*   CREATININE 6.8* 7.8*     LIVER PROFILE:   Recent Labs     02/17/22 0725   AST 12*   ALT 8*   BILITOT 0.4   ALKPHOS 117     PT/INR:   Recent Labs     02/17/22 0725 02/18/22  0803   PROTIME 45.2* 51.9*   INR 3.78* 4.32*       CULTURES  Results for Delia Paris (MRN [id number]) as of 2/17/2022 11:35    Ref.  Range 2/17/2022 09:20   INFLUENZA A Latest Ref Range: NOT DETECTED  NOT DETECTED   INFLUENZA B Latest Ref Range: NOT DETECTED  NOT DETECTED   SARS-CoV-2 RNA, RT PCR Latest Ref Range: NOT DETECTED  NOT DETECTED         Echo 2/18/2022  Summary   Definity contrast administered. Left ventricular systolic function is mildly reduced with visually estimated   EF of 45%. Endocardium not entirely well visualized but no obvious segmental wall   motion abnormalities. Diastolic dysfunction grade and filling pressure are indeterminate. Mild concentric left ventricular hypertrophy. Unable to obtain SPAP due to lack of tricuspid regurgitation. Valves are not well visualized but there are no obvious structural   abnormalities or color flow abnormalities seen on doppler. 9- [facility]. 60%    Pertinent previous results reviewed   ECHO 9/30/19 [employer]  Narrative     · Estimated EF = 60%. · Left ventricular systolic function is normal.       RADIOLOGY  XR CHEST PORTABLE   Final Result   CHF with pulmonary edema and or superimposed pneumonia. Assessment/Plan:    #Unresponsiveness and ? Syncope  -Acute on chronic resp failure vs fluid overload   -Alert now although not at baseline per family and [facility] RN  - unsure what happened at her ECF.  Mentation stable since arrival      #ESRD on HD   -Has missed last 3 dialysis sessions as reported by [facility] RN due to refusal  -Fluid overloaded, Pulmonary edema on CXR  -Pro-BNP elevated >15,000, previous was 6377  -Unclear baseline Crt and GFR but kidney fuction appears to be declined  -Nephrology consulted; had  HD and ongoing aggressive fluid removal   -Requires midodrine for HD        #Acute on chronic respiratory failure with chronic trach vent  #Pulmonary HTN    -Reportedly desaturated during [address] transition at [facility] with unresponsiveness - likely with splinting of chest   -Bagged to 99% and patient had return of conciousness  -Pulm consulted; vent settings per pulm  -Pulm edema vs superimposed pna on CXR  -PCT 0.96  -Vacno and cefepime D#2   -Continue home nebs  - given quick improvement in symptoms with bagging yesterday and now with fluid removal, I favor fluid overload rather than pna  - can stop abx       #Elevated troponin  -0.20--0.39--0.37  -No CP  -EKG with no ischemia  -Likely 2/2 ESRD  - cardiology consulted    #Chronic dCHF no ae  #New onset cardiomyopathy   -BNP elevated  -Fluid overloaded but likely 2/2 need for dialysis  -EKG with no ischemia  -Cardiology consult; will repeat ECHO  - echo today showed decrease in EF 45%  - recommend medical management. No ACE/ARB due to ESRD add low dose toprol as blood pressure allows     #Rectal pain; hemorrhoids   -Chronic, x3 years per daugther  -Has been worked up for this as OP  -Continue pain control and bowel regimen  -General Surgery Consulted as patient is unable to tolerate dialysis at times due to rectal pain.     #CAD  -Continue ASA, statin     #Hx of CVA  -Pharm to dose warfarin      #HLD  -On statin     #DMII  -Lantus 15 units daily  -Med dose SSI  -POCT glucose     #GERD  -Continue PPI     #SILVER  -On chronic trach vent     #Mood disorder  -Continue psych meds     #Morbid Obesity  -Complicating assessment and treatment. Placing patient at risk for multiple co-morbidities as well as early death and contributing to the patient's presentation.   - Recommend weight loss.     DVT Prophylaxis: coumadin   Diet: Diet NPO- - resume oral diet once eval done by SLP  Code Status: Full Code       Chucho Hanson MD, 2/18/2022 2:38 PM

## 2022-02-18 NOTE — PROGRESS NOTES
Perfect serve sent to MD regarding medications and to request pain med/anxiety med switched to IV - pt. Can not take PO medications. Awaiting a response.

## 2022-02-18 NOTE — PROGRESS NOTES
Pharmacy Routine Monitoring of Warfarin (INR)  Active order for anticoagulants/antiplatelets: 179  Significant drug interactions: No obvious interactions  Goal INR: 2 - 3  Recent Labs     02/17/22  0725 02/18/22  0803   INR 3.78* 4.32*   HGB 9.7* 8.9*   LABALBU 3.5 3.1*     Date INR Dose  2/18    4.32     hold today   Recent warfarin administrations        No warfarin orders with administrations found. Orders not given:            warfarin placeholder: dosing by pharmacy                  Assessment/Plan:       INR orders are placed. Hold warfarin today, continue follow  .

## 2022-02-18 NOTE — ADT AUTH CERT
Subscriber Details  Hospital Account [de-identified]  [facility] Subscriber Name/Sex/Relation Subscriber  Subscriber Address/Phone Subscriber Emp/Emp Phone   [facility]   [id number] Pepper Brown - Female   (Self) 1950 [address] [place], [place]  [address]   [phone](H) DISABLED        Utilization Reviews         PA recommendation by Mendel Catalan RN       Review Status Review Entered   In Primary 2022 15:54      Criteria Review   We recommend that the following pt's current hospitalization under Inpatient status Is appropriate       Name: Gibran Rojas   : 1950   CSN: [id number]   INSURANCE: [address] summary 81 yo F admitted for acute hypoxemic hypercapnic respiratory failure   -Also, probable RLL HCAP   Abnormal chest x-ray-CHF and probable pneumonia  -Respiratory culture with Proteus mirabilis   Vitals Tachypnea   Labs and Imaging See above  MCG criteria applies yes  Comments       This chart was reviewed at 3:43 PM 2022    Larry Harris MD   Physician [facility]   CELL : [phone]        Pneumonia - Care Day 2 (2022) by Mendel Catalan RN       Review Status Review Entered   Completed 2022 14:31      Criteria Review      Care Day: 2 Care Date: 2022 Level of Care: Inpatient Floor    Guideline Day 2    Clinical Status    (X) * No CO2 retention or acidosis    2022 2:23 PM EST by Fuentesbert Horacio      CO2: 22    (X) * No requirement for mechanical ventilation    2022 2:31 PM EST by Fuentesbert Horacio      vent is baseline    2022 2:23 PM EST by Milbert Horacio      on vent    (X) * Hypotension absent    2022 2:23 PM EST by Fuentesbert Horacio      No hypotension noted    (X) * Afebrile or fever improved    2022 2:23 PM EST by Milbert Horacio      low grade temp 99.2    (X) * No hypoxia on room air or oxygenation improved    2022 2:23 PM EST by Miles Clark Dayami      on vent back to her baseline settings    (X) * Mental status improved or at baseline    2/18/2022 2:23 PM EST by Maico Estrada      AAoriented times 3 ; moves all 4 ext    Activity    (X) * Increased activity    2/18/2022 2:23 PM EST by Maico Estrada      Up with assistance (Order #[id number]) on 2/17/22    Interventions    (X) Pulse oximetry    2/18/2022 2:23 PM EST by Maico Estrada      oximetry    (X) Possible oxygen    2/18/2022 2:23 PM EST by Maico Estrada      On vent    * Milestone   Additional Notes   DATE: 2/18/22         Relevant baselines: (lab values, vitals, o2 amount/delivery, etc.)   On vent       Pertinent Updates:   Creatinine: 7.8 (HH)         Vitals:99.2, 123/55, 108, 20 and 100% vent FiO2 30%         Abnl/Pertinent Labs/Radiology/Diagnostic Studies:   2/18/2022 07:16   POC Glucose: 102 (H)      2/18/2022 08:03   Sodium: 132 (L)   Potassium: 4.8   Chloride: 93 (L)   CO2: 22   BUN: 79 (H)   Creatinine: 7.8 (HH)   Anion Gap: 17 (H)   GFR Non-: 5 (A)   GFR : 6 (A)   Glucose: 106 (H)   CALCIUM, SERUM, 165880: 8.7   Phosphorus: 5.6 (H)   Albumin: 3.1 (L)   Vancomycin Rm: 18.5   WBC: 9.4   RBC: 2.96 (L)   Hemoglobin Quant: 8.9 (L)   Hematocrit: 28.0 (L)   MCV: 94.4   MCH: 29.9   MCHC: 31.7   MPV: 7.9   RDW: 18.9 (H)   Platelet Count: 828   Prothrombin Time: 51.9 (H)   INR: 4.32 (H)      2/18/2022 08:43   ECHOCARDIOGRAM COMPLETE 2D W DOPPLER W COLOR: Attch   Definity contrast administered.    Left ventricular systolic function is mildly reduced with visually estimated    EF of 45%.    Endocardium not entirely well visualized but no obvious segmental wall    motion abnormalities.    Diastolic dysfunction grade and filling pressure are indeterminate.    Mild concentric left ventricular hypertrophy.    Unable to obtain SPAP due to lack of tricuspid regurgitation.    Valves are not well visualized but there are no obvious structural  abnormalities or color flow abnormalities seen on doppler.    9- [facility]. 60%         Physical Exam:   Gen: Moderate distress. Alert. Nonverbal. Morbidly obese.    Eyes: PERRL. No sclera icterus. No conjunctival injection. ENT: No discharge. Pharynx clear. Neck: Trachea midline. Tracheostomy vent in place.    Resp: No accessory muscle use. No crackles. No wheezes. No rhonchi. CV: Regular rate. Regular rhythm. No murmur.  No rub. No edema. Capillary Refill: Brisk,< 3 seconds    Peripheral Pulses: +2 palpable, equal bilaterally    GI: Non-tender. Non-distended. No masses. No organomegaly. Normal bowel sounds. No hernia. Skin: Warm and dry. No nodule on exposed extremities. No rash on exposed extremities. M/S: No cyanosis. No joint deformity. No clubbing. Neuro: Awake. Grossly nonfocal     Psych: Orientation unclear 2/2 patient nonverbal. Appears anxious.          MD Consults/Assessments & Plans:   IM note:   Assessment/Plan:   #Unresponsiveness and ? Syncope   -Acute on chronic resp failure vs fluid overload vs PNA   -? Multifactorial   -Alert now although not at baseline per family and [facility] RN   -Work up as below       #ESRD on HD TuThSat   -Has missed last 3 dialysis sessions as reported by [facility] RN due to refusal   -Fluid overloaded, Pulmonary edema on CXR   -Pro-BNP elevated >15,000, previous was 6377   -Unclear baseline Crt and GFR but kidney fuction appears to be declined   -Nephrology consult; plan for HD tomorrow   -Requires midodrine for HD       #Acute on chronic respiratory failure with chronic trach vent   #Pulmonary HTN   #? HCAP   -Reportedly desaturated during [address] transition at [facility] with unresponsiveness   -Bagged to 99% and patient had return of conciousness   -Pulm consult; vent settings per pulm   -Pulm edema vs superimposed pna on CXR   -PCT 0.96   -Vacno and cefepime D#2   -Continue home neb       #Elevated troponin   -0.20--0.39--0.37   -No CP   -EKG with no ischemia   -Likely 2/2 ESRD   - cardiology consulted       #Chronic dCHF no ae   #New onset cardiomyopathy    -BNP elevated   -Fluid overloaded but likely 2/2 need for dialysis   -EKG with no ischemia   -Cardiology consult; will repeat ECHO   - echo today showed decrease in EF 45%   - recommend medical management. No ACE/ARB due to ESRD add low dose toprol as blood pressure allows       #Rectal pain; hemorrhoids    -Chronic, x3 years per daugther   -Has been worked up for this as OP   -Continue pain control and bowel regimen   -General Surgery Consulted as patient is unable to tolerate dialysis at times due to rectal pain.       #CAD   -Continue ASA, statin       #Hx of CVA   -Pharm to dose warfarin        #HLD   -On statin       #DMII   -Lantus 15 units daily   -Med dose SSI   -POCT glucose       #GERD   -Continue PPI       #SILVER   -On chronic trach vent       #Mood disorder   -Continue psych meds       #Morbid Obesity   -Complicating assessment and treatment. Placing patient at risk for multiple co-morbidities as well as early death and contributing to the patient's presentation.    - Recommend weight loss. Pulm Note:   ASSESSMENT:   · Acute hypoxemic hypercapnic respiratory failure    · Probable RLL HCAP    · Abnormal chest x-ray-CHF and probable pneumonia   · Diarrhea-rule out C. difficile   · Chronic respiratory failure requiring continuous mechanical ventilation through tracheostomy   · Systolic CHF-EF 81%   · ESRD on HD via right arm fistula   · CAD   · History of COVID-19 October 2020   · History of SILVER pulmonary hypertension       PLAN:   · Mechanical ventilation as per my orders.     · Change to nursing home setting 14/450/+5 30% FiO2    · supplemental oxygen to maintain SaO2 >92%; wean as tolerated   · Head of bed 30 degrees or higher at all times   · Vancomycin and cefepime day #2--DC vancomycin   · Follow-up cultures   · Inhaled bronchodilators   · Speech pathology   · HD per nephrology   · 4 mg   Freq: EVERY 6 HOURS PRN Route: IV   PRN Reasons: Nausea,Vomiting x1      perflutren lipid microspheres (DEFINITY) injection 1.65 mg   Dose: 1.5 mL   Freq: IMG ONCE PRN Route: IV   PRN Reason: Other   PRN Comment: Inability to detect 2 or more contiguous segments in any of the 3 apical views due to poor endocardial border definition x1      PT/OT/SLP/CM Assessments or Notes:

## 2022-02-18 NOTE — PROGRESS NOTES
RT Inhaler-Nebulizer Bronchodilator Protocol Note    There is a bronchodilator order in the chart from a provider indicating to follow the RT Bronchodilator Protocol and there is an Initiate RT Inhaler-Nebulizer Bronchodilator Protocol order as well (see protocol at bottom of note). CXR Findings:  XR CHEST PORTABLE    Result Date: 2/17/2022  CHF with pulmonary edema and or superimposed pneumonia. The findings from the last RT Protocol Assessment were as follows:   History Pulmonary Disease: Smoker 15 pack years or more  Respiratory Pattern: Mild dyspnea at rest, irregular pattern, or RR 21-25 bpm  Breath Sounds: Intermittent or unilateral wheezes  Cough: Strong, productive  Indication for Bronchodilator Therapy: On home bronchodilators  Bronchodilator Assessment Score: 10    Aerosolized bronchodilator medication orders have been revised according to the RT Inhaler-Nebulizer Bronchodilator Protocol below. Respiratory Therapist to perform RT Therapy Protocol Assessment initially then follow the protocol. Repeat RT Therapy Protocol Assessment PRN for score 0-3 or on second treatment, BID, and PRN for scores above 3. No Indications - adjust the frequency to every 6 hours PRN wheezing or bronchospasm, if no treatments needed after 48 hours then discontinue using Per Protocol order mode. If indication present, adjust the RT bronchodilator orders based on the Bronchodilator Assessment Score as indicated below. Use Inhaler orders unless patient has one or more of the following: on home nebulizer, not able to hold breath for 10 seconds, is not alert and oriented, cannot activate and use MDI correctly, or respiratory rate 25 breaths per minute or more, then use the equivalent nebulizer order(s) with same Frequency and PRN reasons based on the score. If a patient is on this medication at home then do not decrease Frequency below that used at home.     0-3 - enter or revise RT bronchodilator order(s) to equivalent RT Bronchodilator order with Frequency of every 4 hours PRN for wheezing or increased work of breathing using Per Protocol order mode. 4-6 - enter or revise RT Bronchodilator order(s) to two equivalent RT bronchodilator orders with one order with BID Frequency and one order with Frequency of every 4 hours PRN wheezing or increased work of breathing using Per Protocol order mode. 7-10 - enter or revise RT Bronchodilator order(s) to two equivalent RT bronchodilator orders with one order with TID Frequency and one order with Frequency of every 4 hours PRN wheezing or increased work of breathing using Per Protocol order mode. 11-13 - enter or revise RT Bronchodilator order(s) to one equivalent RT bronchodilator order with QID Frequency and an Albuterol order with Frequency of every 4 hours PRN wheezing or increased work of breathing using Per Protocol order mode. Greater than 13 - enter or revise RT Bronchodilator order(s) to one equivalent RT bronchodilator order with every 4 hours Frequency and an Albuterol order with Frequency of every 2 hours PRN wheezing or increased work of breathing using Per Protocol order mode.            Electronically signed by Emily Median RCP on 2/17/2022 at 8:56 PM

## 2022-02-18 NOTE — PROGRESS NOTES
Consult has been called to Dr. Elia Gonzalez on 2/18/22. Spoke with Kia Hanley.  2:55 PM    Varsha Schafer  2/18/2022

## 2022-02-18 NOTE — FLOWSHEET NOTE
02/18/22 0915   Vital Signs   Temp 99 °F (37.2 °C)   Temp Source Oral   Pulse 108   Heart Rate Source Monitor   Resp 20   BP (!) 147/69   BP Location Left upper arm   Patient Position Semi fowlers   Level of Consciousness Alert (0)   MEWS Score 2   Oxygen Therapy   SpO2 100 %   Pulse Oximeter Device Mode Continuous   Pulse Oximeter Device Location Finger   O2 Device Ventilator   FiO2  30 %     Patient going to HD in stable condition with RT and patient transport.

## 2022-02-18 NOTE — PROGRESS NOTES
[address]  Cardiology  Progress Note    Admission date:  2022    Reason for follow up visit: CHF    HPI/CC: Travon Waters is a 80 y.o. female who was admitted 2022 after unresponsive episode at HD. Cardiology consulted for CHF. Echo showed EF 45%. Rhythm has been sinus. Subjective: Seen at dialysis. No complaints though history is limited    Vitals:  Blood pressure (!) 140/65, pulse 87, temperature 99.2 °F (37.3 °C), resp. rate 20, height 5' 4\" (1.626 m), weight (!) 316 lb 12.8 oz (143.7 kg), SpO2 100 %.   Temp  Av.4 °F (36.9 °C)  Min: 97.6 °F (36.4 °C)  Max: 99.2 °F (37.3 °C)  Pulse  Av.4  Min: 87  Max: 108  BP  Min: 125/69  Max: 147/69  SpO2  Av.7 %  Min: 99 %  Max: 100 %  FiO2   Av %  Min: 30 %  Max: 30 %    24 hour I/O    Intake/Output Summary (Last 24 hours) at 2022 1342  Last data filed at 2022 1226  Gross per 24 hour   Intake 339.42 ml   Output 0 ml   Net 339.42 ml     Current Facility-Administered Medications   Medication Dose Route Frequency Provider Last Rate Last Admin    epoetin claire-epbx (RETACRIT) injection 16,000 Units  16,000 Units IntraVENous Once per day on  Josue Suh MD   16,000 Units at 22 1300    doxercalciferol (HECTOROL) injection 6 mcg  6 mcg IntraVENous Once per day on  Josue Suh MD   6 mcg at 22 1259    vancomycin (VANCOCIN) 750 mg in dextrose 5 % 250 mL IVPB  750 mg IntraVENous Once Lopez New, APRN - CNP        sodium chloride flush 0.9 % injection 5-40 mL  5-40 mL IntraVENous 2 times per day Lopez New, APRN - CNP        sodium chloride flush 0.9 % injection 5-40 mL  5-40 mL IntraVENous PRN Lopez New, APRN - CNP        0.9 % sodium chloride infusion  25 mL IntraVENous PRN Lopez ITALO Pimentel CNP        ondansetron (ZOFRAN-ODT) disintegrating tablet 4 mg  4 mg Oral Q8H PRN Lopez ITALO Pimentel CNP        Or    ondansetron [facility]) injection 4 mg  4 mg IntraVENous Q6H PRN Lopezjob Pimentel APRN - CNP   4 mg at 02/18/22 0554    polyethylene glycol (GLYCOLAX) packet 17 g  17 g Oral Daily PRN [facility], APRN - CNP        cefepime (MAXIPIME) 1000 mg IVPB minibag  1,000 mg IntraVENous Q24H [facility], APRN - CNP        heparin (porcine) injection 5,000 Units  5,000 Units SubCUTAneous 3 times per day [facility], APRN - CNP   5,000 Units at 02/18/22 0556    glucose (GLUTOSE) 40 % oral gel 15 g  15 g Oral PRN NIKKO Nina        glucagon (rDNA) injection 1 mg  1 mg IntraMUSCular PRN NIKKO Nina        dextrose 5 % solution  100 mL/hr IntraVENous PRN NIKKO Nina        insulin lispro (HUMALOG) injection vial 0-12 Units  0-12 Units SubCUTAneous TID  NIKKO Nina        insulin lispro (HUMALOG) injection vial 0-6 Units  0-6 Units SubCUTAneous Nightly NIKKO Nina   1 Units at 02/17/22 2158    dextrose bolus (hypoglycemia) 10% 125 mL  125 mL IntraVENous PRN NIKKO Nina        Or    dextrose bolus (hypoglycemia) 10% 250 mL  250 mL IntraVENous PRN NIKKO Nina        warfarin placeholder: dosing by pharmacy   Other RX Placeholder Yaquelin Nina        ALPRAZolam Emmei Holloway) tablet 0.5 mg  0.5 mg Oral BID [facility], APRN - CNP   0.5 mg at 02/17/22 2104    aspirin EC tablet 81 mg  81 mg Oral Daily [facility], APRN - CNP        calcium acetate (PHOSLO) capsule 1,334 mg  1,334 mg Oral TID [facility], APRN - CNP        chlorhexidine (PERIDEX) 0.12 % solution 15 mL  15 mL Mouth/Throat BID [facility], APRN - CNP   15 mL at 02/17/22 2107    vitamin B-12 (CYANOCOBALAMIN) tablet 500 mcg  500 mcg Oral Daily [facility], APRN - CNP        dextromethorphan-guaiFENesin (MUCINEX DM)  MG per extended release tablet 1 tablet  1 tablet Oral BID [facility], APRN - CNP        diphenhydrAMINE (BENADRYL) tablet 25 mg  25 mg Oral Q8H PRN [facility], APRN - CNP        docusate sodium (COLACE) capsule 100 mg  100 mg Oral BID Merlin Diamonds, APRN - CNP   100 mg at 02/17/22 2104    furosemide (LASIX) tablet 20 mg  20 mg Oral Once per day on Mon Wed Fri ITALO Jin CNP        HYDROcodone-acetaminophen (NORCO) 5-325 MG per tablet 1 tablet  1 tablet Oral Q12H PRN ITALO Jin CNP   1 tablet at 02/18/22 6017    hydrocortisone (ANUSOL-HC) suppository 25 mg  25 mg Rectal BID ITALO Jin CNP   25 mg at 02/17/22 2107    hydrocortisone 1 % cream   Topical BID ITALO Jin CNP   Given at 02/17/22 2349    insulin glargine (LANTUS) injection vial 15 Units  15 Units SubCUTAneous Daily ITALO Jin CNP        lactulose (CHRONULAC) 10 GM/15ML solution 20 g  20 g Oral Daily PRN ITALO Jin CNP        midodrine (PROAMATINE) tablet 10 mg  10 mg Oral Once per day on Mon Wed Fri ITALO Jin CNP        midodrine (PROAMATINE) tablet 10 mg  10 mg Oral PRN ITALO Jin CNP        pantoprazole (PROTONIX) tablet 40 mg  40 mg Oral QAM AC ITALO Jin CNP        QUEtiapine (SEROQUEL) tablet 12.5 mg  12.5 mg Oral Nightly ITALO Jin CNP   12.5 mg at 02/17/22 2104    sennosides-docusate sodium (SENOKOT-S) 8.6-50 MG tablet 2 tablet  2 tablet Oral Nightly ITALO Jin CNP   2 tablet at 02/17/22 2104    sertraline (ZOLOFT) tablet 50 mg  50 mg Oral Daily ITALO Jin CNP        [START ON 2/20/2022] sodium polystyrene (KAYEXALATE) 15 GM/60ML suspension 15 g  15 g Oral Once per day on Sun Mon Wed Fri ITALO Jin CNP        doxazosin (CARDURA) tablet 1 mg  1 mg Oral Nightly ITALO Jin CNP   1 mg at 02/17/22 2104    atorvastatin (LIPITOR) tablet 80 mg  80 mg Oral Nightly Pat Kanaris, PA   80 mg at 02/17/22 2104    acetaminophen (TYLENOL) tablet 650 mg  650 mg Oral Q6H PRN ITALO Jin CNP        Or    acetaminophen (TYLENOL) suppository 650 mg  650 mg Rectal Q6H PRN ITALO Jin CNP        ipratropium-albuterol (DUONEB) nebulizer solution 1 ampule  1 ampule Inhalation Q4H Abdoulaye Camacho Ramez Bryson MD   1 ampule at 02/18/22 0759     Review of Systems   Constitutional: Negative. Respiratory: Negative. Cardiovascular: Negative. Neurological: Negative. Objective:     Telemetry monitor: SR    Physical Exam:  Constitutional:  Comfortable and alert, NAD, appears older than stated age, obese  Eyes: PERRL, sclera nonicteric  Neck:  Supple, no masses, no thyroidmegaly, JVD difficult to assess  Skin:  Warm and dry; no rash or lesions  Heart: Regular, normal apex, S1 and S2 normal, no M/G/R  Lungs:  Mechanical ventilation +trach; diminished  Abdomen: soft, non tender, + bowel sounds  Extremities:  No edema or cyanosis; no clubbing  Neuro: alert and oriented, moves legs and arms equally, normal mood and affect    Data Reviewed:    Echo 2/18/2022:  Definity contrast administered.    Left ventricular systolic function is mildly reduced with visually estimated    EF of 45%.    Endocardium not entirely well visualized but no obvious segmental wall    motion abnormalities.    Diastolic dysfunction grade and filling pressure are indeterminate.    Mild concentric left ventricular hypertrophy.    Unable to obtain SPAP due to lack of tricuspid regurgitation.    Valves are not well visualized but there are no obvious structural    abnormalities or color flow abnormalities seen on doppler.    9- [facility].  60%     Lab Reviewed:     Renal Profile:  Lab Results   Component Value Date    CREATININE 7.8 02/18/2022    BUN 79 02/18/2022     02/18/2022    K 4.8 02/18/2022    K 4.7 02/17/2022    CL 93 02/18/2022    CO2 22 02/18/2022     CBC:    Lab Results   Component Value Date    WBC 9.4 02/18/2022    RBC 2.96 02/18/2022    HGB 8.9 02/18/2022    HCT 28.0 02/18/2022    MCV 94.4 02/18/2022    RDW 18.9 02/18/2022     02/18/2022     BNP:    Lab Results   Component Value Date    PROBNP 15,169 02/17/2022    PROBNP 6,377 06/17/2021     Fasting Lipid Panel:  No results found for: CHOL, HDL, TRIG  Cardiac Enzymes:  CK/MbTroponin  Lab Results   Component Value Date    TROPONINI 0.37 02/17/2022     PT/ INR   Lab Results   Component Value Date    INR 4.32 02/18/2022    INR 3.78 02/17/2022    INR 2.06 01/10/2022    PROTIME 51.9 02/18/2022    PROTIME 45.2 02/17/2022    PROTIME 24.0 01/10/2022     PTT No results found for: PTT No results found for: MG No results found for: TSH    All labs and imaging reviewed today    Assessment:  Elevated troponin: peak 0.39, no signs of ACS, possibly supply-demand imbalance with poor renal clearance  Reported CAD: no records  Acute on chronic diastolic, with new systolic CHF: ongoing  Cardiomyopathy, etiology unknown: mild, EF 45% on echo  HTN  DM  SILVER  ESRD  Chronic respiratory failure: s/p trach chronic vent  Obesity  History of CVA    Plan:   1. Mild cardiomyopathy noted on echo. Would not pursue ischemic evaluation due to co morbidities, nonischemic EKG and lack of angina. Medical management  2. Add low dose toprol and ACE/ARB if BP allows  3. Continue aspirin, statin  4. Fluid management with HD  5. Cardiology will sign off, please call with questions.     Jabsir Santana, APRN-CNP  [address]  [phone]

## 2022-02-18 NOTE — PROGRESS NOTES
02/18/22 0307   Vent Information   Vent Type 840   Vent Mode AC/VC   Vt Ordered 355 mL   Rate Set 22 bmp   Peak Flow 50 L/min   FiO2  30 %   Sensitivity 3   PEEP/CPAP 5   Vent Patient Data   Peak Inspiratory Pressure 23 cmH2O   Mean Airway Pressure 12 cmH20   Rate Measured 29 br/min   Vt Exhaled 373 mL   Minute Volume 11.4 Liters   I:E Ratio 1:1.8   Cough/Sputum   Sputum How Obtained Suctioned;Tracheal   Sputum Amount Small   Sputum Color Creamy   Tenacity Thick   Breath Sounds   Right Upper Lobe Diminished   Right Middle Lobe Diminished   Right Lower Lobe Diminished   Left Upper Lobe Diminished   Left Lower Lobe Diminished   Additional Respiratory  Assessments   Position Semi-Ríos's

## 2022-02-18 NOTE — PLAN OF CARE
Nutrition Problem #1: Inadequate oral intake  Intervention: Food and/or Nutrient Delivery: Continue NPO,Start Tube Feeding  Nutritional Goals: patient will tolerate Nepro at goal rate of 35 ml/hr x 20 hours without GI distress, without s/s of aspiration, and without additional lab/fluid disturbances

## 2022-02-18 NOTE — CARE COORDINATION
INTERDISCIPLINARY PLAN OF CARE CONFERENCE    Date/Time: 2/18/2022 4:35 PM  Completed by:  SAÚL Bishop Case Management      Patient Name:  Esme Lux  YOB: 1950  Admitting Diagnosis: Acute pulmonary edema ([facility] [J81.0]  ESRD on dialysis ([facility] [N18.6, Z99.2]  Acute on chronic respiratory failure with hypoxia ([facility] [facility] [J96.21]  Pneumonia due to infectious organism, unspecified laterality, unspecified part of lung [J18.9]     Admit Date/Time:  2/17/2022  7:12 AM    Chart reviewed. Interdisciplinary team contacted or reviewed plan related to patient progress and discharge plans. Disciplines included Case Management, Nursing, and Dietitian. Current Status:Ongoing   PT/OT recommendation for discharge plan of care: n/a    Expected D/C Disposition:  Nursing Home    Discharge Plan Comments: Chart review completed. RN ADELITA spoke with pt's daughter yesterday and the plan is for pt to return to [facility] (see her note).      Message left for [facility] requesting a call back to confirm pt is a bed hold    Home O2 in place on admit: per facility     Addendum at 4:46pm: [facility] stated pt is a bed hold

## 2022-02-18 NOTE — PROGRESS NOTES
Comprehensive Nutrition Assessment    Type and Reason for Visit:  Initial,Consult,Positive Nutrition Screen (consult for TF ordering and management; + screen for \"other\")    Nutrition Recommendations/Plan:   1. Continue NPO status until patient is medically cleared/cleared by SLP to receive appropriate nutrition therapy. 2. TF recommendations - ADULT TUBE FEEDING; PEG tube; Renal formula - Nepro with a goal rate of 35 ml/hr x 20 hours. Start with 20 ml/hr and increase by 15 ml every 2-4 hours, as tolerated by patient, until goal rate can be achieved and maintained. Water flushes, 30 ml every 4 hours for tube patency. Please administer one proteinex P2Go TWICE daily via feeding tube. 3. Monitor respiratory status, HD status, and plan of care. 4. Monitor nutrition-related labs, bowel function, and weight trends. Nutrition Assessment:  patient is nutritionally compromised AEB respiratory dysfunction PTA and patient has a hx of ESRD with HD but patient had missed some HD treatments PTA d/t refusal by patient to go to HD and she is at risk for further compromise d/t NPO status, hx of trach/PEG placement, morbid obesity, and altered nutrition-related labs; will provide EN recommendations    Malnutrition Assessment:  Malnutrition Status: At risk for malnutrition    Context:  Acute Illness     Findings of the 6 clinical characteristics of malnutrition:  Energy Intake:  Unable to assess  Weight Loss:  No significant weight loss     Body Fat Loss:  Unable to assess (patient is in HD this afternoon)     Muscle Mass Loss:  Unable to assess (patient is in HD this afternoon)    Fluid Accumulation:  No significant fluid accumulation     Strength:  Not Performed    Estimated Daily Nutrient Needs:  Energy (kcal):  1152 - 1584 kcals based on 8-11 kcals/kg/CBW; Weight Used for Energy Requirements:  Current     Protein (g):  173 - 202 g protein based on 1.2-1.4 g/kg/CBW;  Weight Used for Protein Requirements:  Ideal Fluid (ml/day):  1152 - 1584 ml; Method Used for Fluid Requirements:  1 ml/kcal      Nutrition Related Findings:  patient is alert; she had HD today; patient presented to the hospital after she was being transferred from HD back to [facility] and she went unresponsive so she was transported to [facility]; patient is from [facility] and usually receives dialysis on TThSat schedule; patient typically requests that HD time be cut short or refuses it altogether on HD treatment days; patient was transferred to [facility] from [facility] in 2021 after being a patient in [facility] from 12/26/20 to mid-Feb 2021; she had a trach placed on 2/2/21 and PEG placed on 1/21/21; she failed swallow evaluation so that is why PEG was placed; patient reportedly consumes po nutrition and takes her pills by mouth at [facility] but SLP recommended NPO until patient's cuff can be deflated for po trials;  Na, Cl and h/h are low; BUN/Cr and Phos are elevated; GFR = 6; hgb A1c was 5.4% on 2/17/22; patient has lipitor, peridex, colace, lasix, vitamin B12, 15 units Lantus/daily, med-dose SSI, protonix, seroquel, senokot-s, zoloft, and kayexalate ordered at this time      Wounds:  None       Current Nutrition Therapies:    Current Tube Feeding (TF) Orders:  · Feeding Route: PEG  · Formula: Renal Formula  · Schedule: Continuous  · Additives/Modulars: Protein (one proteinex P2Go TWICE daily via feeding tube)  · Water Flushes: 30 ml water flushes every 4 hours for tube patency  · Current TF & Flush Orders Provides: TF recommendations only at this time  · Goal TF & Flush Orders Provides: Nepro with a goal rate of 35 ml/hr x 20 hours = 700 ml TV, 1260 kcals, 57 g protein, and 509 ml free water + 52 g protein and 208 kcals from one proteinex P2Go TWICE daily (109 g protein and 1468 kcals total) + 30 ml water flushes every 4 hours for tube patency      Anthropometric Measures:  · Height:  (per past encounters)  · Current Body Weight: 316 lb 12.8 oz (143.7 kg) (obtained on 2/18/22; actual weight)   · Admission Body Weight: 316 lb 14.4 oz (143.7 kg) (obtained on 2/17/22; actual weight)    · Usual Body Weight: 371 lb 0.6 oz (168.3 kg) (obtained during admission 12/26/20 - 2/17/21 at [facility])     · [place] Body Weight: 120 lbs; % Ideal Body Weight 264 %   · BMI: 54.4  · BMI Categories: Obese Class 3 (BMI 40.0 or greater) (54.38)       Nutrition Diagnosis:   · Inadequate oral intake related to inadequate protein-energy intake,impaired respiratory function,renal dysfunction,endocrine dysfuntion,swallowing difficulty as evidenced by NPO or clear liquid status due to medical condition,swallow study results,lab values,dialysis      Nutrition Interventions:   Food and/or Nutrient Delivery:  Continue NPO,Start Tube Feeding  Nutrition Education/Counseling:  No recommendation at this time   Coordination of Nutrition Care:  Continue to monitor while inpatient    Goals:  patient will tolerate Nepro at goal rate of 35 ml/hr x 20 hours without GI distress, without s/s of aspiration, and without additional lab/fluid disturbances       Nutrition Monitoring and Evaluation:   Behavioral-Environmental Outcomes:  None Identified   Food/Nutrient Intake Outcomes:  Diet Advancement/Tolerance,Enteral Nutrition Intake/Tolerance  Physical Signs/Symptoms Outcomes:  Biochemical Data,Chewing or Swallowing,Fluid Status or Edema,Hemodynamic Status,Weight,Skin,Nutrition Focused Physical Findings     Discharge Planning:     Too soon to determine     Electronically signed by Elijah Rivera RD, LD on 2/18/22 at 2:02 PM EST    Contact: [phone]

## 2022-02-18 NOTE — FLOWSHEET NOTE
02/18/22 0955 02/18/22 1325   Treatment   Time On 0955  --    Time Off  --  1325   Vital Signs   BP (!) 140/65 (!) 123/55   Temp 99.2 °F (37.3 °C) 98.6 °F (37 °C)   Pulse 87 88   Resp 20 20   Weight (!) 316 lb 12.8 oz (143.7 kg) (!) 306 lb 14.1 oz (139.2 kg)   Post-Hemodialysis Assessment   NET Removed (ml)  --  4500 ml       Treatment time: 3.5 HOURS  Net UF: 4500ml    Pre weight: 143.7kg  Post weight: 139.2kg  EDW: 137.0kg    Access used: AVF  Access function: good    Medications or blood products given: retacrit and hectorol     Regular outpatient schedule: MWF    Summary of response to treatment: pt tolerated HD well, was able to remove 4500ml with stable vitals. Copy of dialysis treatment record placed in chart, to be scanned into EMR.     Electronically signed by Madiha Menendez RN on 2/18/2022 at 1:58 PM

## 2022-02-18 NOTE — PROGRESS NOTES
This RN informed MD pt. Is unable to take PO medications. Pt. Has not been cleared from speech therapy.

## 2022-02-19 LAB
ANION GAP SERPL CALCULATED.3IONS-SCNC: 16 MMOL/L (ref 3–16)
BASOPHILS ABSOLUTE: 0 K/UL (ref 0–0.2)
BASOPHILS RELATIVE PERCENT: 0.4 %
BUN BLDV-MCNC: 49 MG/DL (ref 7–20)
CALCIUM SERPL-MCNC: 8.9 MG/DL (ref 8.3–10.6)
CHLORIDE BLD-SCNC: 97 MMOL/L (ref 99–110)
CO2: 22 MMOL/L (ref 21–32)
CREAT SERPL-MCNC: 6 MG/DL (ref 0.6–1.2)
EOSINOPHILS ABSOLUTE: 0.3 K/UL (ref 0–0.6)
EOSINOPHILS RELATIVE PERCENT: 3.3 %
GFR AFRICAN AMERICAN: 8
GFR NON-AFRICAN AMERICAN: 7
GLUCOSE BLD-MCNC: 106 MG/DL (ref 70–99)
GLUCOSE BLD-MCNC: 118 MG/DL (ref 70–99)
GLUCOSE BLD-MCNC: 87 MG/DL (ref 70–99)
GLUCOSE BLD-MCNC: 87 MG/DL (ref 70–99)
GLUCOSE BLD-MCNC: 91 MG/DL (ref 70–99)
GLUCOSE BLD-MCNC: 92 MG/DL (ref 70–99)
HCT VFR BLD CALC: 29 % (ref 36–48)
HEMOGLOBIN: 9 G/DL (ref 12–16)
HEPATITIS B SURFACE ANTIGEN INTERPRETATION: NORMAL
INR BLD: 4.78 (ref 0.88–1.12)
LYMPHOCYTES ABSOLUTE: 0.9 K/UL (ref 1–5.1)
LYMPHOCYTES RELATIVE PERCENT: 11.8 %
MCH RBC QN AUTO: 30.2 PG (ref 26–34)
MCHC RBC AUTO-ENTMCNC: 31.2 G/DL (ref 31–36)
MCV RBC AUTO: 97 FL (ref 80–100)
MONOCYTES ABSOLUTE: 0.8 K/UL (ref 0–1.3)
MONOCYTES RELATIVE PERCENT: 9.6 %
NEUTROPHILS ABSOLUTE: 6 K/UL (ref 1.7–7.7)
NEUTROPHILS RELATIVE PERCENT: 74.9 %
PDW BLD-RTO: 19.4 % (ref 12.4–15.4)
PERFORMED ON: ABNORMAL
PERFORMED ON: ABNORMAL
PERFORMED ON: NORMAL
PLATELET # BLD: 244 K/UL (ref 135–450)
PMV BLD AUTO: 7.6 FL (ref 5–10.5)
POTASSIUM REFLEX MAGNESIUM: 4.8 MMOL/L (ref 3.5–5.1)
PROTHROMBIN TIME: 57.6 SEC (ref 9.9–12.7)
RBC # BLD: 2.99 M/UL (ref 4–5.2)
SODIUM BLD-SCNC: 135 MMOL/L (ref 136–145)
WBC # BLD: 8 K/UL (ref 4–11)

## 2022-02-19 PROCEDURE — 87340 HEPATITIS B SURFACE AG IA: CPT

## 2022-02-19 PROCEDURE — 6360000002 HC RX W HCPCS: Performed by: NURSE PRACTITIONER

## 2022-02-19 PROCEDURE — 2060000000 HC ICU INTERMEDIATE R&B

## 2022-02-19 PROCEDURE — 6370000000 HC RX 637 (ALT 250 FOR IP): Performed by: INTERNAL MEDICINE

## 2022-02-19 PROCEDURE — 36415 COLL VENOUS BLD VENIPUNCTURE: CPT

## 2022-02-19 PROCEDURE — 6360000002 HC RX W HCPCS: Performed by: INTERNAL MEDICINE

## 2022-02-19 PROCEDURE — 92526 ORAL FUNCTION THERAPY: CPT

## 2022-02-19 PROCEDURE — 80048 BASIC METABOLIC PNL TOTAL CA: CPT

## 2022-02-19 PROCEDURE — 85025 COMPLETE CBC W/AUTO DIFF WBC: CPT

## 2022-02-19 PROCEDURE — 94640 AIRWAY INHALATION TREATMENT: CPT

## 2022-02-19 PROCEDURE — 90935 HEMODIALYSIS ONE EVALUATION: CPT

## 2022-02-19 PROCEDURE — 6370000000 HC RX 637 (ALT 250 FOR IP)

## 2022-02-19 PROCEDURE — 2500000003 HC RX 250 WO HCPCS: Performed by: NURSE PRACTITIONER

## 2022-02-19 PROCEDURE — 2580000003 HC RX 258: Performed by: NURSE PRACTITIONER

## 2022-02-19 PROCEDURE — 6370000000 HC RX 637 (ALT 250 FOR IP): Performed by: NURSE PRACTITIONER

## 2022-02-19 PROCEDURE — 94003 VENT MGMT INPAT SUBQ DAY: CPT

## 2022-02-19 PROCEDURE — 99232 SBSQ HOSP IP/OBS MODERATE 35: CPT | Performed by: INTERNAL MEDICINE

## 2022-02-19 PROCEDURE — 94761 N-INVAS EAR/PLS OXIMETRY MLT: CPT

## 2022-02-19 PROCEDURE — 85610 PROTHROMBIN TIME: CPT

## 2022-02-19 PROCEDURE — 2700000000 HC OXYGEN THERAPY PER DAY

## 2022-02-19 PROCEDURE — 99233 SBSQ HOSP IP/OBS HIGH 50: CPT | Performed by: INTERNAL MEDICINE

## 2022-02-19 RX ORDER — LIDOCAINE HYDROCHLORIDE 20 MG/ML
JELLY TOPICAL 2 TIMES DAILY
Status: DISCONTINUED | OUTPATIENT
Start: 2022-02-19 | End: 2022-02-21 | Stop reason: HOSPADM

## 2022-02-19 RX ADMIN — HYDROCORTISONE ACETATE 25 MG: 25 SUPPOSITORY RECTAL at 21:26

## 2022-02-19 RX ADMIN — IPRATROPIUM BROMIDE AND ALBUTEROL SULFATE 1 AMPULE: 2.5; .5 SOLUTION RESPIRATORY (INHALATION) at 15:42

## 2022-02-19 RX ADMIN — HYDROCORTISONE: 1 CREAM TOPICAL at 21:28

## 2022-02-19 RX ADMIN — ATORVASTATIN CALCIUM 80 MG: 40 TABLET, FILM COATED ORAL at 21:27

## 2022-02-19 RX ADMIN — DOCUSATE SODIUM 100 MG: 100 CAPSULE ORAL at 21:27

## 2022-02-19 RX ADMIN — DOXERCALCIFEROL 6 MCG: 4 INJECTION, SOLUTION INTRAVENOUS at 10:18

## 2022-02-19 RX ADMIN — IPRATROPIUM BROMIDE AND ALBUTEROL SULFATE 1 AMPULE: 2.5; .5 SOLUTION RESPIRATORY (INHALATION) at 19:56

## 2022-02-19 RX ADMIN — DOCUSATE SODIUM 50MG AND SENNOSIDES 8.6MG 2 TABLET: 8.6; 5 TABLET, FILM COATED ORAL at 21:27

## 2022-02-19 RX ADMIN — MORPHINE SULFATE 2 MG: 2 INJECTION, SOLUTION INTRAMUSCULAR; INTRAVENOUS at 06:38

## 2022-02-19 RX ADMIN — HEPARIN SODIUM 5000 UNITS: 5000 INJECTION INTRAVENOUS; SUBCUTANEOUS at 21:26

## 2022-02-19 RX ADMIN — ACETAMINOPHEN 650 MG: 325 TABLET ORAL at 21:27

## 2022-02-19 RX ADMIN — HEPARIN SODIUM 5000 UNITS: 5000 INJECTION INTRAVENOUS; SUBCUTANEOUS at 05:51

## 2022-02-19 RX ADMIN — IPRATROPIUM BROMIDE AND ALBUTEROL SULFATE 1 AMPULE: 2.5; .5 SOLUTION RESPIRATORY (INHALATION) at 07:37

## 2022-02-19 RX ADMIN — LORAZEPAM 0.5 MG: 2 INJECTION INTRAMUSCULAR; INTRAVENOUS at 21:27

## 2022-02-19 RX ADMIN — EPOETIN ALFA-EPBX 16000 UNITS: 10000 INJECTION, SOLUTION INTRAVENOUS; SUBCUTANEOUS at 10:17

## 2022-02-19 RX ADMIN — HEPARIN SODIUM 5000 UNITS: 5000 INJECTION INTRAVENOUS; SUBCUTANEOUS at 14:58

## 2022-02-19 RX ADMIN — DOXAZOSIN 1 MG: 2 TABLET ORAL at 21:27

## 2022-02-19 RX ADMIN — IPRATROPIUM BROMIDE AND ALBUTEROL SULFATE 1 AMPULE: 2.5; .5 SOLUTION RESPIRATORY (INHALATION) at 12:22

## 2022-02-19 RX ADMIN — METOPROLOL SUCCINATE 12.5 MG: 25 TABLET, EXTENDED RELEASE ORAL at 14:55

## 2022-02-19 RX ADMIN — CHLORHEXIDINE GLUCONATE 0.12% ORAL RINSE 15 ML: 1.2 LIQUID ORAL at 21:26

## 2022-02-19 RX ADMIN — QUETIAPINE FUMARATE 12.5 MG: 25 TABLET ORAL at 21:26

## 2022-02-19 RX ADMIN — GUAIFENESIN AND DEXTROMETHORPHAN HYDROBROMIDE 1 TABLET: 600; 30 TABLET, EXTENDED RELEASE ORAL at 21:29

## 2022-02-19 RX ADMIN — LIDOCAINE HYDROCHLORIDE: 20 JELLY TOPICAL at 21:28

## 2022-02-19 RX ADMIN — Medication 10 ML: at 21:29

## 2022-02-19 RX ADMIN — CALCIUM ACETATE 1334 MG: 667 CAPSULE ORAL at 17:34

## 2022-02-19 RX ADMIN — CEFEPIME HYDROCHLORIDE 1000 MG: 1 INJECTION, POWDER, FOR SOLUTION INTRAMUSCULAR; INTRAVENOUS at 14:54

## 2022-02-19 RX ADMIN — ASPIRIN 81 MG: 81 TABLET, COATED ORAL at 14:54

## 2022-02-19 ASSESSMENT — PULMONARY FUNCTION TESTS
PIF_VALUE: 17
PIF_VALUE: 22
PIF_VALUE: 23
PIF_VALUE: 24

## 2022-02-19 ASSESSMENT — PAIN SCALES - GENERAL
PAINLEVEL_OUTOF10: 0
PAINLEVEL_OUTOF10: 8
PAINLEVEL_OUTOF10: 10

## 2022-02-19 ASSESSMENT — PAIN DESCRIPTION - LOCATION: LOCATION: BUTTOCKS

## 2022-02-19 ASSESSMENT — PAIN DESCRIPTION - PAIN TYPE: TYPE: ACUTE PAIN

## 2022-02-19 NOTE — PROGRESS NOTES
Consult has been called to [address] [facility] at [address] aware  Anaid Guadalupe  2/19/2022

## 2022-02-19 NOTE — FLOWSHEET NOTE
02/19/22 1430   Vital Signs   Temp Source Oral   Pulse 89   Heart Rate Source Monitor   Resp 24   /68   BP Location Left upper arm   Patient Position High fowlers   Level of Consciousness Alert (0)   Oxygen Therapy   SpO2 98 %   O2 Device Ventilator     Pt sitting in bed. Speaking. In no distress. GI will see patient tomorrow for rectal pain.

## 2022-02-19 NOTE — FLOWSHEET NOTE
02/18/22 1945   Vital Signs   Temp 98.8 °F (37.1 °C)   Temp Source Oral   Pulse 99   Heart Rate Source Monitor   Resp 22   /71   BP Location Left upper arm   Patient Position High fowlers   Level of Consciousness Alert (0)   MEWS Score 2   Patient Currently in Pain Yes   Pain Assessment   Pain Assessment 0-10   Pain Level 6   Patient's Stated Pain Goal No pain   Pain Type Acute pain   Pain Location Buttocks   Oxygen Therapy   SpO2 100 %   Pulse Oximeter Device Mode Continuous   Pulse Oximeter Device Location Left;Finger   O2 Device Ventilator     Shift assessment completed, see flow sheet. Patient is A&O x4. Medications administered via IV, pt NPO for failure of bedside swallow. Patient denies further needs at this time. Call light within reach. Will continue to monitor.

## 2022-02-19 NOTE — PROGRESS NOTES
Pulmonary Progress Note    CC: Mechanical ventilation    Subjective:   Undergoing dialysis  30% FiO2      IV line: Peripheral IVs      Intake/Output Summary (Last 24 hours) at 2/19/2022 0748  Last data filed at 2/18/2022 1325  Gross per 24 hour   Intake 400 ml   Output 4900 ml   Net -4500 ml       Exam:   BP (!) 146/73   Pulse 88   Temp 99.2 °F (37.3 °C) (Oral) Comment: notifed RN  Resp 25   Ht 5' 4\" (1.626 m)   Wt (!) 304 lb 14.4 oz (138.3 kg)   SpO2 100%   BMI 52.34 kg/m²  on 30%  General: ill appearing. On mechanical ventilation  Eyes: PERRL. No sclera icterus. No conjunctival injection. ENT: No discharge. Pharynx clear. Tracheostomy tube in place  Neck: Trachea midline. Normal thyroid. Resp: No accessory muscle use. Few crackles. No wheezing. Few rhonchi. CV: Regular rate. Regular rhythm. No mumur or rub. No edema. GI: Non-tender. Non-distended. No masses. Skin: Warm and dry. No nodule on exposed extremities. No rash on exposed extremities. Lymph: No cervical LAD. No supraclavicular LAD. M/S: No cyanosis. No joint deformity. No clubbing. Neuro: On mechanical ventilation. Following commands. Alert and awake. Psych: No agitation or anxiety.      Scheduled Meds:   epoetin claire-epbx  16,000 Units IntraVENous Once per day on Tue Thu Sat    doxercalciferol  6 mcg IntraVENous Once per day on Tue Thu Sat    metoprolol succinate  12.5 mg Oral Daily    LORazepam  0.5 mg IntraVENous BID    sodium chloride flush  5-40 mL IntraVENous 2 times per day    cefepime  1,000 mg IntraVENous Q24H    heparin (porcine)  5,000 Units SubCUTAneous 3 times per day    insulin lispro  0-12 Units SubCUTAneous TID     insulin lispro  0-6 Units SubCUTAneous Nightly    warfarin placeholder: dosing by pharmacy   Other RX Placeholder    [Held by provider] ALPRAZolam  0.5 mg Oral BID    aspirin  81 mg Oral Daily    calcium acetate  1,334 mg Oral TID     chlorhexidine  15 mL Mouth/Throat BID    cyanocobalamin  500 mcg Oral Daily    dextromethorphan-guaiFENesin  1 tablet Oral BID    docusate sodium  100 mg Oral BID    furosemide  20 mg Oral Once per day on Mon Wed Fri    hydrocortisone  25 mg Rectal BID    hydrocortisone   Topical BID    insulin glargine  15 Units SubCUTAneous Daily    midodrine  10 mg Oral Once per day on Mon Wed Fri    pantoprazole  40 mg Oral QAM AC    QUEtiapine  12.5 mg Oral Nightly    sennosides-docusate sodium  2 tablet Oral Nightly    sertraline  50 mg Oral Daily    [START ON 2/20/2022] sodium polystyrene  15 g Oral Once per day on Sun Mon Wed Fri    doxazosin  1 mg Oral Nightly    atorvastatin  80 mg Oral Nightly    ipratropium-albuterol  1 ampule Inhalation Q4H     Continuous Infusions:   sodium chloride      dextrose       PRN Meds:  morphine, sodium chloride flush, sodium chloride, ondansetron **OR** ondansetron, polyethylene glycol, glucose, glucagon (rDNA), dextrose, dextrose bolus (hypoglycemia) **OR** dextrose bolus (hypoglycemia), diphenhydrAMINE, [Held by provider] HYDROcodone-acetaminophen, lactulose, midodrine, acetaminophen **OR** acetaminophen    Labs:  CBC:   Recent Labs     02/17/22 0725 02/18/22  0803 02/19/22  0430   WBC 15.1* 9.4 8.0   HGB 9.7* 8.9* 9.0*   HCT 31.8* 28.0* 29.0*   MCV 95.0 94.4 97.0    250 244     BMP:   Recent Labs     02/17/22 0725 02/18/22  0803 02/19/22  0430    132* 135*   K 4.7 4.8 4.8   CL 95* 93* 97*   CO2 25 22 22   PHOS  --  5.6*  --    BUN 70* 79* 49*   CREATININE 6.8* 7.8* 6.0*     LIVER PROFILE:   Recent Labs     02/17/22 0725   AST 12*   ALT 8*   BILITOT 0.4   ALKPHOS 117     PT/INR:   Recent Labs     02/17/22 0725 02/18/22  0803 02/19/22  0430   PROTIME 45.2* 51.9* 57.6*   INR 3.78* 4.32* 4.78*     APTT: No results for input(s): APTT in the last 72 hours.   UA:No results for input(s): NITRITE, COLORU, PHUR, LABCAST, WBCUA, RBCUA, MUCUS, TRICHOMONAS, YEAST, BACTERIA, CLARITYU, SPECGRAV, LEUKOCYTESUR, UROBILINOGEN, BILIRUBINUR, BLOODU, GLUCOSEU, AMORPHOUS in the last 72 hours. Invalid input(s): Marilin Jean  Recent Labs     02/17/22  1703   PHART 7.382   HGQ0DUC 45.2*   PO2ART 136.4*       Cultures:   2/17 BC NGTD  2/17 respiratory Proteus and Acinetobacter  2/17 COVID-19 not detected    Films:  Chest x-ray 2/17 imaging was reviewed by me and showed   Tracheostomy tube in place  Pulmonary edema  Right lower lobe airspace disease       Echocardiogram 2/18  Definity contrast administered. Left ventricular systolic function is mildly reduced with visually estimated   EF of 45%. Endocardium not entirely well visualized but no obvious segmental wall   motion abnormalities. Diastolic dysfunction grade and filling pressure are indeterminate. Mild concentric left ventricular hypertrophy. Unable to obtain SPAP due to lack of tricuspid regurgitation. Valves are not well visualized but there are no obvious structural   abnormalities or color flow abnormalities seen on doppler.           ASSESSMENT:  · Acute hypoxemic hypercapnic respiratory failure   · Probable RLL HCAP   · Abnormal chest x-ray-CHF and probable pneumonia  · Chronic respiratory failure requiring continuous mechanical ventilation through tracheostomy  · Systolic CHF-EF 82%  · ESRD on HD via right arm fistula  · CAD  · History of COVID-19 October 2020  · History of SILVER pulmonary hypertension     PLAN:  · Mechanical ventilation as per my orders- AC14/450/+5 30% FiO2   · Head of bed 30 degrees or higher at all times  · Cefepime day #3  · Follow-up cultures  · Inhaled bronchodilators  · Speech pathology  · HD per nephrology  · Cardiology following  · Coumadin pharmacy to dose

## 2022-02-19 NOTE — FLOWSHEET NOTE
02/19/22 0800   Vital Signs   Temp 99.1 °F (37.3 °C)   Temp Source Oral   Pulse 86   /61   Oxygen Therapy   SpO2 99 %   O2 Device Ventilator       Escorted pt to HD.

## 2022-02-19 NOTE — PROGRESS NOTES
02/18/22 1959   Vent Information   Vent Type 840   Vent Mode AC/VC   Vt Ordered 355 mL   Rate Set 22 bmp   Peak Flow 50 L/min   FiO2  30 %   SpO2 100 %   SpO2/FiO2 ratio 333.33   Sensitivity 3   PEEP/CPAP 5   Humidification Source Heated wire   Humidification Temp Measured 37   Circuit Condensation Drained   Vent Patient Data   Peak Inspiratory Pressure 23 cmH2O   Mean Airway Pressure 10 cmH20   Rate Measured 22 br/min   Vt Exhaled 373 mL   Minute Volume 7.9 Liters   I:E Ratio 1:2.3   Plateau Pressure 22 KFS58   Static Compliance 21 mL/cmH2O   Dynamic Compliance 10 mL/cmH2O   Cough/Sputum   Sputum How Obtained Tracheal   Cough Productive   Sputum Amount Moderate   Sputum Color Red   Tenacity Thick   Breath Sounds   Right Upper Lobe Diminished   Right Middle Lobe Diminished   Right Lower Lobe Diminished   Left Upper Lobe Diminished   Left Lower Lobe Diminished   Alarm Settings   High Pressure Alarm 55 cmH2O   Low Minute Volume Alarm 2.5 L/min   Apnea (secs) 20 secs   High Respiratory Rate 45 br/min   Low Exhaled Vt  200 mL   Patient Observation   Observations 6 dawna xlt distal

## 2022-02-19 NOTE — PROGRESS NOTES
[facility]  Nephrology Follow up Note           Reason for Consult:  ESRD  Requesting Physician:  Dr. Marcellus Bucio history  Cough has been deflated, patient is able to speak and able to eat    HD on 2/19 with UF of 2l over 3 hours, post weight 136.3 kg  HD on 2/18 with 4.5 L UF over 3.5 hours, post weight 139.2 kg    ROS: No chest pain/shortness of breath/fever/nausea/vomiting  PSFH: No visitor    Scheduled Meds:   lidocaine   Topical BID    epoetin claire-epbx  16,000 Units IntraVENous Once per day on Tue Thu Sat    doxercalciferol  6 mcg IntraVENous Once per day on Tue Thu Sat    metoprolol succinate  12.5 mg Oral Daily    LORazepam  0.5 mg IntraVENous BID    sodium chloride flush  5-40 mL IntraVENous 2 times per day    cefepime  1,000 mg IntraVENous Q24H    heparin (porcine)  5,000 Units SubCUTAneous 3 times per day    insulin lispro  0-12 Units SubCUTAneous TID WC    insulin lispro  0-6 Units SubCUTAneous Nightly    warfarin placeholder: dosing by pharmacy   Other RX Placeholder    [Held by provider] ALPRAZolam  0.5 mg Oral BID    aspirin  81 mg Oral Daily    calcium acetate  1,334 mg Oral TID WC    chlorhexidine  15 mL Mouth/Throat BID    cyanocobalamin  500 mcg Oral Daily    dextromethorphan-guaiFENesin  1 tablet Oral BID    docusate sodium  100 mg Oral BID    furosemide  20 mg Oral Once per day on Mon Wed Fri    hydrocortisone  25 mg Rectal BID    hydrocortisone   Topical BID    insulin glargine  15 Units SubCUTAneous Daily    midodrine  10 mg Oral Once per day on Mon Wed Fri    pantoprazole  40 mg Oral QAM AC    QUEtiapine  12.5 mg Oral Nightly    sennosides-docusate sodium  2 tablet Oral Nightly    sertraline  50 mg Oral Daily    [START ON 2/20/2022] sodium polystyrene  15 g Oral Once per day on Sun Mon Wed Fri    doxazosin  1 mg Oral Nightly    atorvastatin  80 mg Oral Nightly    ipratropium-albuterol  1 ampule Inhalation Q4H     Continuous Infusions:   sodium chloride      dextrose       PRN Meds:.morphine, sodium chloride flush, sodium chloride, ondansetron **OR** ondansetron, polyethylene glycol, glucose, glucagon (rDNA), dextrose, dextrose bolus (hypoglycemia) **OR** dextrose bolus (hypoglycemia), diphenhydrAMINE, [Held by provider] HYDROcodone-acetaminophen, lactulose, midodrine, acetaminophen **OR** acetaminophen    History of Present Illness on 2/17/22:    80 y.o. yo female with PMH of ESRD on HD, chronic resp failure on trach/vent since 2/2021, HTN, COVID pneumonia, DVT on coumadin  who is admitted for loss of consciousness  Pt has been t/f to  [facility] from [place] [address] last year as she has been needing HD and is trach/vent  She usually cuts her HD session short and misses HD frequently. She says her hemorrhoids? give a lot of pain while laying on her bed during hD  Today she did dialysis for 1.5h and removed 300 ml only as she asked to come off HD. She became unresponsive when being t/f from HDU to the nursing home and had to bagged for few minutes before she regained consciousness. Apparently she had been on SBT for a week or so but currently is back on the vent.   cxr shows chf and pneumonia    Physical exam:   Constitutional:  VITALS:  /68   Pulse 89   Temp 97.1 °F (36.2 °C) (Oral)   Resp 24   Ht 5' 4\" (1.626 m)   Wt (!) 300 lb 7.8 oz (136.3 kg)   SpO2 98%   BMI 51.58 kg/m²   Gen: alert, awake  Cardiovascular:  S1, S2 without m/r/g no lower extremity edema  Respiratory: decreased breath sounds ; trach in situ  Abdomen:  soft, nt, nd,   Neuro/Psy: AAoriented times 3 ; moves all 4 ext    Data/  Recent Labs     02/17/22  0725 02/18/22  0803 02/19/22  0430   WBC 15.1* 9.4 8.0   HGB 9.7* 8.9* 9.0*   HCT 31.8* 28.0* 29.0*   MCV 95.0 94.4 97.0    250 244     Recent Labs     02/17/22  0725 02/18/22  0803 02/19/22  0430    132* 135*   K 4.7 4.8 4.8   CL 95* 93* 97*   CO2 25 22 22   GLUCOSE 186* 106* 87   PHOS  --  5.6*  --    BUN 70* 79* 49*

## 2022-02-19 NOTE — PROGRESS NOTES
[facility]side [facility] Pt currently resting in bed with the call light within reach. Pt denies any other care needs at this time. Pt stable at this time.

## 2022-02-19 NOTE — PROGRESS NOTES
Speech Language Pathology  Facility/Department: [facility] PCU TELEMETRY  Dysphagia Daily Treatment Note  Recommendations:  Solid Consistency: IDDSI 6 Soft and Bite Sized Solids  Liquid Consistency: IDDSI 0 Thin Liquids  Medication: Meds whole with thin liquids    Risk Management: upright for all intake, stay upright for at least 30 mins after intake, small bites/sips, close supervision with intake, oral care 2-3x/day to reduce adverse affects in the event of aspiration, slow rate of intake, general aspiration precautions and hold PO and contact SLP if s/s of aspiration or worsening respiratory status develop. Recommend strict aspiration precautions and close supervision via RN and/or RT with all intake. Feed only if cuff is deflated. NAME: Terra George  : 1950  MRN: [id number]    Patient Diagnosis(es):   Patient Active Problem List    Diagnosis Date Noted    HCAP (healthcare-associated pneumonia)     Unresponsive episode     Syncope     Elevated troponin     Acute respiratory failure with hypoxia and hypercapnia ([address]     Pneumonia due to infectious organism     Abnormal chest x-ray     ESRD on dialysis ([address]     Chronic respiratory failure requiring continuous mechanical ventilation through tracheostomy ([address]     Acute on chronic respiratory failure with hypoxia (HCC)     Acute pulmonary edema (HCC)     Rectal pain     History of CVA (cerebrovascular accident)     Chest pain 2021     Allergies: Allergies   Allergen Reactions    Haloperidol Lactate      Other reaction(s): Unknown (See Comments)  PT DOESN'T REMEMBER  Other reaction(s): Unknown (See Comments)  PT DOESN'T REMEMBER      Ziprasidone Hcl      Other reaction(s):  Other (See Comments), Unknown (See Comments)  PT DOESN'T REMEMBER  PT DOESN'T REMEMBER  Other reaction(s): Unknown (See Comments)  PT DOESN'T REMEMBER       Onset Date: 2022    Subjective: Pt seen in room at bedside with RN permission    Pain: The patient does not complain of pain     Current Diet: Diet NPO    Diet Tolerance:  Patient tolerating current diet level without signs/symptoms of penetration / aspiration. Dysphagia Treatment and Impressions:   Pt seen in room at bedside with RN permission   Chart Review/Interview: pt was assessed at bedside on 02/17. At time of assessment, pt's cuff inflated, therefore, limited assessment with PO. SLP attempted to see the patient yesterday, 02/18, however, cuff remained inflated. Per chart, pt required PO meds yesterday. SLP was called but it was after end of my shift and I was not in the building. MD gave order to deflate cuff and perform nursing bedside assessment. Per nursing notes, the patient was initially \"ok\" but soon after starting to eat meal she began coughing up bright red blood through trach. RT rec'ed CXR but MD did not feel it was necessary. Gave order to re-inflate cuff and place pt back on NPO diet with IV meds. Today, RT reports the patient continues to have bloody secretions from trach site when suctioned. Does not appear concerned. Dr. Josh Meier called and he gave order for cuff deflation and PO trials. RT in to deflate cuff and adjust vent settings. Pt tolerated well. Immediate voicing noted. Vocal quality is WFL with adequate volume and strength.  Pt has 6 Shiley XLT Distal trach. Chronic trach/vent pt from [facility]. Normally consumes SBS and thin liquids. Has PEG but reports has not used it in [place] while\".  Respiratory Status: Vent settings as follows: Peak flow 50 LPM, PEEP 5, FiO2% 30, O2 100%   Liquid PO Trials:    IDDSI 0 Thin:  Assessed via cup: no anterior bolus loss , suspect functional A-P bolus transit, swallow timing subjectively appears timely. No coughing or choking noted. Wet vocal quality 2/6 trials-cleared with indep TC.    Solid PO Trials   IDDSI 4 Puree:   no anterior bolus loss , suspect functional A-P bolus transit, swallow timing subjectively appears timely, oral clearance grossly WFL and no clinical s/s of aspiration   IDDSI 7 Regular:   no anterior bolus loss , suspect functional A-P bolus transit, swallow timing subjectively appears timely, grossly functional mastication, oral clearance WFL. Cough and vent alarming on 3/10 trials. Pt noted with high rate of intake at that time. Improved with cues for slow rate of intake. Cough is dry.  Education: SLP edu pt re: Role of SLP, Rationale for dysphagia tx, Recommended compensatory strategies, Aspiration precautions and POC. Pt verbalized understanding and RN aware of recommendations   Assessment: Pt grossly tolerating baseline diet per assessment. Would prefer instrumental assessment prior to PO advancement, however, access limited as it is the weekend and radiology does not complete MBS on weekend. Pt tolerating baseline diet at [facility] with no reported concerns. Overall improved respiratory status today. OK with advancement with strict precautions in place. See below for recommendations.  Recommendations: SLP recommends to advance to IDDSI 6 Soft and Bite Sized Solids; IDDSI 0 Thin Liquids; Meds whole with thin liquids  · Risk Management: upright for all intake, stay upright for at least 30 mins after intake, small bites/sips, close supervision with intake, oral care 2-3x/day to reduce adverse affects in the event of aspiration, slow rate of intake, general aspiration precautions and hold PO and contact SLP if s/s of aspiration or worsening respiratory status develop. Recommend strict aspiration precautions and close supervision via RN and/or RT with all intake. Dysphagia Goals:  Timeframe for Long-term Goals: 7 days (2/24/22)  Goal 1: Pt will demonstrate clinically safe swallow of soft solids and thin liquids  2/19/2022 Ongoing, progressing. Short-term Goals  Timeframe for Short-term Goals: 5 days (2/22/22)  Dysphagia Goals:   Goal 1.  The patient will tolerate recommended diet without observed clinical signs of aspiration  2/19/2022 : Goal addressed, see above. Ongoing, progressing. Goal 2: The patient will recall and perform compensatory strategies, with no cues. 2/19/2022 : Goal addressed, see above. Ongoing, progressing. Speech/Language/Cog Goals: N/A    Recommendations:  Solid Consistency: IDDSI 6 Soft and Bite Sized Solids  Liquid Consistency: IDDSI 0 Thin Liquids  Medication: Meds whole with thin liquids    Patient/Family/Caregiver Education: See above    Compensatory Strategies: See above    Plan:    Continued Dysphagia treatment with goals per plan of care. Discharge Recommendations: TBD    If pt discharges from hospital prior to Speech/Swallowing discharge, this note serves as tx and discharge summary.      Total Treatment Time / Charges     Time in Time out Total Time / units   Cognitive Tx         Speech Tx      Dysphagia Tx 1251 1320 29 mins / 1 unit     Signature:  Danny Gomez M.A., [id number] [facility] #[id number]  Speech-Language Pathologist  Phone: [phone]

## 2022-02-19 NOTE — PROGRESS NOTES
Treatment time: 3    Net UF: 2000    Pre weight: 138.3  Post weight: 136.3  EDW: tbd    Access used: avf  Access function: well    Medications or blood products given: epogen 28733r, hectoral 6mcg    Regular outpatient schedule: TTS    Summary of response to treatment: tolerated well. No issues noted/reported t/o tx. Report called to primary RN. Pt stable upon leaving unit    Copy of dialysis treatment record placed in chart, to be scanned into EMR.

## 2022-02-19 NOTE — PROGRESS NOTES
Speech Language Pathology  SLP Attempt Note        Name: Terra George  : 1950  Medical Diagnosis: Acute pulmonary edema ([facility] [J81.0]  ESRD on dialysis ([facility] [N18.6, Z99.2]  Acute on chronic respiratory failure with hypoxia ([facility] [J96.21]  Pneumonia due to infectious organism, unspecified laterality, unspecified part of lung [J18.9]    Attempted to see pt again for dysphagia follow-up. Per chart, pt required PO meds yesterday. SLP was called but it was after end of my shift and I was not in the building. MD gave order to deflate cuff and perform nursing bedside assessment. Per nursing notes, the patient was initially \"ok\" but soon after starting to eat meal she began coughing up bright red blood through trach. RT rec'ed CXR but MD did not feel it was necessary. Gave order to re-inflate cuff and place pt back on NPO diet with IV meds. Pt in dialysis at time of attempt. Cuff remains inflated, per RN. Will continue to follow while here today and assess if cuff is successfully deflated. No charges at this time.  Thank you,      Carlos Yoo M.A., [address] [facility]  Speech-Language Pathologist  Phone: [phone]

## 2022-02-19 NOTE — PROGRESS NOTES
Shift assessment complete- see flow sheet. Patient is A/Ox4. VSS. Morning medications given without difficulty with water. Diet restarted. Will monitor. Chronic vent/trach, SpO2 WNL. Lung sounds diminished. Denies new or worserning shortness of breath. Still with thick red secretions when suctioning. Patient denies any further needs. Call light explained and in reach. Bed alarm on. Will continue to monitor.

## 2022-02-19 NOTE — PROGRESS NOTES
Warfarin per Rx:  INR 4.78 this AM.  Continue to hold until INR begins to trend down.   Precious Yoo [facility] PharmD 2/19/2022 8:47 AM

## 2022-02-20 LAB
ANION GAP SERPL CALCULATED.3IONS-SCNC: 13 MMOL/L (ref 3–16)
BASOPHILS ABSOLUTE: 0 K/UL (ref 0–0.2)
BASOPHILS RELATIVE PERCENT: 0.5 %
BUN BLDV-MCNC: 33 MG/DL (ref 7–20)
CALCIUM SERPL-MCNC: 9.3 MG/DL (ref 8.3–10.6)
CHLORIDE BLD-SCNC: 97 MMOL/L (ref 99–110)
CO2: 26 MMOL/L (ref 21–32)
CREAT SERPL-MCNC: 4.7 MG/DL (ref 0.6–1.2)
EOSINOPHILS ABSOLUTE: 0.4 K/UL (ref 0–0.6)
EOSINOPHILS RELATIVE PERCENT: 5.9 %
GFR AFRICAN AMERICAN: 11
GFR NON-AFRICAN AMERICAN: 9
GLUCOSE BLD-MCNC: 101 MG/DL (ref 70–99)
GLUCOSE BLD-MCNC: 123 MG/DL (ref 70–99)
GLUCOSE BLD-MCNC: 124 MG/DL (ref 70–99)
GLUCOSE BLD-MCNC: 135 MG/DL (ref 70–99)
GLUCOSE BLD-MCNC: 141 MG/DL (ref 70–99)
GLUCOSE BLD-MCNC: 97 MG/DL (ref 70–99)
HCT VFR BLD CALC: 27.7 % (ref 36–48)
HEMOGLOBIN: 8.6 G/DL (ref 12–16)
INR BLD: 3.58 (ref 0.88–1.12)
LYMPHOCYTES ABSOLUTE: 1.1 K/UL (ref 1–5.1)
LYMPHOCYTES RELATIVE PERCENT: 16.5 %
MCH RBC QN AUTO: 29.8 PG (ref 26–34)
MCHC RBC AUTO-ENTMCNC: 31.2 G/DL (ref 31–36)
MCV RBC AUTO: 95.8 FL (ref 80–100)
MONOCYTES ABSOLUTE: 0.6 K/UL (ref 0–1.3)
MONOCYTES RELATIVE PERCENT: 9.7 %
NEUTROPHILS ABSOLUTE: 4.4 K/UL (ref 1.7–7.7)
NEUTROPHILS RELATIVE PERCENT: 67.4 %
PDW BLD-RTO: 19.2 % (ref 12.4–15.4)
PERFORMED ON: ABNORMAL
PLATELET # BLD: 285 K/UL (ref 135–450)
PMV BLD AUTO: 7.3 FL (ref 5–10.5)
POTASSIUM REFLEX MAGNESIUM: 4.1 MMOL/L (ref 3.5–5.1)
PROTHROMBIN TIME: 42.6 SEC (ref 9.9–12.7)
RBC # BLD: 2.89 M/UL (ref 4–5.2)
SODIUM BLD-SCNC: 136 MMOL/L (ref 136–145)
WBC # BLD: 6.5 K/UL (ref 4–11)

## 2022-02-20 PROCEDURE — 6370000000 HC RX 637 (ALT 250 FOR IP)

## 2022-02-20 PROCEDURE — 6370000000 HC RX 637 (ALT 250 FOR IP): Performed by: NURSE PRACTITIONER

## 2022-02-20 PROCEDURE — 6360000002 HC RX W HCPCS: Performed by: NURSE PRACTITIONER

## 2022-02-20 PROCEDURE — 99233 SBSQ HOSP IP/OBS HIGH 50: CPT | Performed by: INTERNAL MEDICINE

## 2022-02-20 PROCEDURE — 85025 COMPLETE CBC W/AUTO DIFF WBC: CPT

## 2022-02-20 PROCEDURE — 85610 PROTHROMBIN TIME: CPT

## 2022-02-20 PROCEDURE — 94003 VENT MGMT INPAT SUBQ DAY: CPT

## 2022-02-20 PROCEDURE — 36415 COLL VENOUS BLD VENIPUNCTURE: CPT

## 2022-02-20 PROCEDURE — 94761 N-INVAS EAR/PLS OXIMETRY MLT: CPT

## 2022-02-20 PROCEDURE — 6370000000 HC RX 637 (ALT 250 FOR IP): Performed by: INTERNAL MEDICINE

## 2022-02-20 PROCEDURE — 6360000002 HC RX W HCPCS: Performed by: INTERNAL MEDICINE

## 2022-02-20 PROCEDURE — 94640 AIRWAY INHALATION TREATMENT: CPT

## 2022-02-20 PROCEDURE — 80048 BASIC METABOLIC PNL TOTAL CA: CPT

## 2022-02-20 PROCEDURE — 2500000003 HC RX 250 WO HCPCS: Performed by: NURSE PRACTITIONER

## 2022-02-20 PROCEDURE — 2060000000 HC ICU INTERMEDIATE R&B

## 2022-02-20 PROCEDURE — 2580000003 HC RX 258: Performed by: NURSE PRACTITIONER

## 2022-02-20 PROCEDURE — 2700000000 HC OXYGEN THERAPY PER DAY

## 2022-02-20 RX ADMIN — LORAZEPAM 0.5 MG: 2 INJECTION INTRAMUSCULAR; INTRAVENOUS at 08:29

## 2022-02-20 RX ADMIN — DOCUSATE SODIUM 100 MG: 100 CAPSULE ORAL at 08:28

## 2022-02-20 RX ADMIN — IPRATROPIUM BROMIDE AND ALBUTEROL SULFATE 1 AMPULE: 2.5; .5 SOLUTION RESPIRATORY (INHALATION) at 03:41

## 2022-02-20 RX ADMIN — IPRATROPIUM BROMIDE AND ALBUTEROL SULFATE 1 AMPULE: 2.5; .5 SOLUTION RESPIRATORY (INHALATION) at 23:12

## 2022-02-20 RX ADMIN — CYANOCOBALAMIN TAB 500 MCG 500 MCG: 500 TAB at 08:28

## 2022-02-20 RX ADMIN — HEPARIN SODIUM 5000 UNITS: 5000 INJECTION INTRAVENOUS; SUBCUTANEOUS at 21:37

## 2022-02-20 RX ADMIN — PANTOPRAZOLE SODIUM 40 MG: 40 TABLET, DELAYED RELEASE ORAL at 08:37

## 2022-02-20 RX ADMIN — ASPIRIN 81 MG: 81 TABLET, COATED ORAL at 08:28

## 2022-02-20 RX ADMIN — ATORVASTATIN CALCIUM 80 MG: 40 TABLET, FILM COATED ORAL at 20:09

## 2022-02-20 RX ADMIN — Medication 10 ML: at 08:30

## 2022-02-20 RX ADMIN — DOXAZOSIN 1 MG: 2 TABLET ORAL at 20:09

## 2022-02-20 RX ADMIN — SERTRALINE HYDROCHLORIDE 50 MG: 50 TABLET ORAL at 08:29

## 2022-02-20 RX ADMIN — IPRATROPIUM BROMIDE AND ALBUTEROL SULFATE 1 AMPULE: 2.5; .5 SOLUTION RESPIRATORY (INHALATION) at 19:48

## 2022-02-20 RX ADMIN — LIDOCAINE HYDROCHLORIDE: 20 JELLY TOPICAL at 23:52

## 2022-02-20 RX ADMIN — CHLORHEXIDINE GLUCONATE 0.12% ORAL RINSE 15 ML: 1.2 LIQUID ORAL at 08:29

## 2022-02-20 RX ADMIN — METOPROLOL SUCCINATE 12.5 MG: 25 TABLET, EXTENDED RELEASE ORAL at 08:27

## 2022-02-20 RX ADMIN — ACETAMINOPHEN 650 MG: 325 TABLET ORAL at 20:10

## 2022-02-20 RX ADMIN — WHITE PETROLATUM: 1.75 OINTMENT TOPICAL at 15:39

## 2022-02-20 RX ADMIN — DOCUSATE SODIUM 100 MG: 100 CAPSULE ORAL at 20:09

## 2022-02-20 RX ADMIN — INSULIN GLARGINE 15 UNITS: 100 INJECTION, SOLUTION SUBCUTANEOUS at 08:40

## 2022-02-20 RX ADMIN — HEPARIN SODIUM 5000 UNITS: 5000 INJECTION INTRAVENOUS; SUBCUTANEOUS at 06:39

## 2022-02-20 RX ADMIN — CEFEPIME HYDROCHLORIDE 1000 MG: 1 INJECTION, POWDER, FOR SOLUTION INTRAMUSCULAR; INTRAVENOUS at 14:34

## 2022-02-20 RX ADMIN — CHLORHEXIDINE GLUCONATE 0.12% ORAL RINSE 15 ML: 1.2 LIQUID ORAL at 20:09

## 2022-02-20 RX ADMIN — GLYCERIN 2 G: 2 SUPPOSITORY RECTAL at 18:31

## 2022-02-20 RX ADMIN — LIDOCAINE HYDROCHLORIDE: 20 JELLY TOPICAL at 08:32

## 2022-02-20 RX ADMIN — CALCIUM ACETATE 1334 MG: 667 CAPSULE ORAL at 08:28

## 2022-02-20 RX ADMIN — GUAIFENESIN AND DEXTROMETHORPHAN HYDROBROMIDE 1 TABLET: 600; 30 TABLET, EXTENDED RELEASE ORAL at 08:30

## 2022-02-20 RX ADMIN — HEPARIN SODIUM 5000 UNITS: 5000 INJECTION INTRAVENOUS; SUBCUTANEOUS at 14:29

## 2022-02-20 RX ADMIN — IPRATROPIUM BROMIDE AND ALBUTEROL SULFATE 1 AMPULE: 2.5; .5 SOLUTION RESPIRATORY (INHALATION) at 11:27

## 2022-02-20 RX ADMIN — QUETIAPINE FUMARATE 12.5 MG: 25 TABLET ORAL at 20:09

## 2022-02-20 RX ADMIN — LORAZEPAM 0.5 MG: 2 INJECTION INTRAMUSCULAR; INTRAVENOUS at 20:10

## 2022-02-20 RX ADMIN — CALCIUM ACETATE 1334 MG: 667 CAPSULE ORAL at 12:35

## 2022-02-20 RX ADMIN — IPRATROPIUM BROMIDE AND ALBUTEROL SULFATE 1 AMPULE: 2.5; .5 SOLUTION RESPIRATORY (INHALATION) at 07:30

## 2022-02-20 RX ADMIN — IPRATROPIUM BROMIDE AND ALBUTEROL SULFATE 1 AMPULE: 2.5; .5 SOLUTION RESPIRATORY (INHALATION) at 15:17

## 2022-02-20 RX ADMIN — IPRATROPIUM BROMIDE AND ALBUTEROL SULFATE 1 AMPULE: 2.5; .5 SOLUTION RESPIRATORY (INHALATION) at 00:08

## 2022-02-20 ASSESSMENT — PAIN SCALES - GENERAL
PAINLEVEL_OUTOF10: 0
PAINLEVEL_OUTOF10: 0
PAINLEVEL_OUTOF10: 8

## 2022-02-20 ASSESSMENT — PULMONARY FUNCTION TESTS
PIF_VALUE: 13
PIF_VALUE: 20
PIF_VALUE: 21
PIF_VALUE: 19
PIF_VALUE: 15
PIF_VALUE: 22
PIF_VALUE: 18

## 2022-02-20 ASSESSMENT — ENCOUNTER SYMPTOMS
RESPIRATORY NEGATIVE: 1
ALLERGIC/IMMUNOLOGIC NEGATIVE: 1
GASTROINTESTINAL NEGATIVE: 1
EYES NEGATIVE: 1

## 2022-02-20 NOTE — PROGRESS NOTES
02/19/22 1957   Vent Information   Vent Type 840   Vent Mode AC/VC   Vt Ordered 355 mL   Rate Set 22 bmp   Peak Flow 50 L/min   FiO2  30 %   SpO2 98 %   SpO2/FiO2 ratio 326.67   Sensitivity 3   PEEP/CPAP 5   Humidification Source Heated wire   Humidification Temp Measured 36.5   Circuit Condensation Drained   Vent Patient Data   Peak Inspiratory Pressure 22 cmH2O   Mean Airway Pressure 11 cmH20   Rate Measured 25 br/min   Vt Exhaled 378 mL   Minute Volume 8.9 Liters   I:E Ratio 1:2.5   Plateau Pressure 19 ATI64   Static Compliance 26 mL/cmH2O   Dynamic Compliance 24 mL/cmH2O   Cough/Sputum   Sputum How Obtained Tracheal   Cough Productive   Sputum Amount Large   Sputum Color Dark red   Tenacity Thick   Breath Sounds   Right Upper Lobe Diminished   Right Middle Lobe Diminished   Right Lower Lobe Diminished   Left Upper Lobe Diminished   Left Lower Lobe Diminished   Alarm Settings   High Pressure Alarm 55 cmH2O   Low Minute Volume Alarm 2.5 L/min   Apnea (secs) 20 secs   High Respiratory Rate 45 br/min   Low Exhaled Vt  200 mL   Patient Observation   Observations 6 dawna xlt distal Yes

## 2022-02-20 NOTE — CONSULTS
Gastroenterology Consult Note    Patient:   Francesca Nettles   :    1950   Facility:     [facility] [address]   Referring/PCP: Keiko Mesa MD  Date:     2022  Consultant:   Zoya Story DO    Subjective: This 80 y.o. female was admitted 2022 with a diagnosis of \"Acute pulmonary edema ([address] [J81.0]  ESRD on dialysis ([address] [N18.6, Z99.2]  Acute on chronic respiratory failure with hypoxia ([address] [J96.21]  Pneumonia due to infectious organism, unspecified laterality, unspecified part of lung [J18.9]\" and is seen in consultation regarding rectal pain    81 yo female with reports of 5 year history of rectal pain. States saw a specialist in [place] who stated she had irritation at her rectum. She also states she trends towards constipation. Viscous lidocaine was applied and has helped with symptoms. Rectal exam was performed which demonstrates hemorrhoids and a small posterior fissure. Past Medical History:   Diagnosis Date    Acute and chronic respiratory failure (acute-on-chronic) ([facility])     Anxiety disorder     Atherosclerotic heart disease of native coronary artery without angina pectoris     Cerebral infarction ([facility])     Chronic pain syndrome     Dependence on renal dialysis ([address]     Dependence on respirator ([facility])     End stage renal disease ([facility])     Gastro-esophageal reflux disease with esophagitis, without bleeding     Insomnia     Major depressive disorder, recurrent ([address]     Morbid obesity due to excess calories ([facility])     Muscle weakness     Obstructive sleep apnea (adult) (pediatric)     Other hyperlipidemia     Other voice and resonance disorders     Personal history of COVID-19     Pulmonary hypertension ([address]     Tracheostomy status ([address]     Type 2 diabetes mellitus without complications ([address]     Unspecified atrial fibrillation ([address]      History reviewed.  No pertinent surgical history. Social:   Social History     Tobacco Use    Smoking status: Former Smoker    Smokeless tobacco: Never Used   Substance Use Topics    Alcohol use: Never     Family: History reviewed. No pertinent family history.     Scheduled Medications:    lidocaine   Topical BID    epoetin claire-epbx  16,000 Units IntraVENous Once per day on Tue Thu Sat    doxercalciferol  6 mcg IntraVENous Once per day on Tue Thu Sat    metoprolol succinate  12.5 mg Oral Daily    LORazepam  0.5 mg IntraVENous BID    sodium chloride flush  5-40 mL IntraVENous 2 times per day    cefepime  1,000 mg IntraVENous Q24H    heparin (porcine)  5,000 Units SubCUTAneous 3 times per day    insulin lispro  0-12 Units SubCUTAneous TID WC    insulin lispro  0-6 Units SubCUTAneous Nightly    warfarin placeholder: dosing by pharmacy   Other RX Placeholder    [Held by provider] ALPRAZolam  0.5 mg Oral BID    aspirin  81 mg Oral Daily    calcium acetate  1,334 mg Oral TID WC    chlorhexidine  15 mL Mouth/Throat BID    cyanocobalamin  500 mcg Oral Daily    dextromethorphan-guaiFENesin  1 tablet Oral BID    docusate sodium  100 mg Oral BID    furosemide  20 mg Oral Once per day on Mon Wed Fri    hydrocortisone  25 mg Rectal BID    hydrocortisone   Topical BID    insulin glargine  15 Units SubCUTAneous Daily    midodrine  10 mg Oral Once per day on Mon Wed Fri    pantoprazole  40 mg Oral QAM AC    QUEtiapine  12.5 mg Oral Nightly    sennosides-docusate sodium  2 tablet Oral Nightly    sertraline  50 mg Oral Daily    sodium polystyrene  15 g Oral Once per day on Sun Mon Wed Fri    doxazosin  1 mg Oral Nightly    atorvastatin  80 mg Oral Nightly    ipratropium-albuterol  1 ampule Inhalation Q4H     Infusions:    sodium chloride      dextrose       PRN Medications: morphine, sodium chloride flush, sodium chloride, ondansetron **OR** ondansetron, polyethylene glycol, glucose, glucagon (rDNA), dextrose, dextrose bolus (hypoglycemia) **OR** dextrose bolus (hypoglycemia), diphenhydrAMINE, [Held by provider] HYDROcodone-acetaminophen, lactulose, midodrine, acetaminophen **OR** acetaminophen  Allergies: Allergies   Allergen Reactions    Haloperidol Lactate      Other reaction(s): Unknown (See Comments)  PT DOESN'T REMEMBER  Other reaction(s): Unknown (See Comments)  PT DOESN'T REMEMBER      Ziprasidone Hcl      Other reaction(s): Other (See Comments), Unknown (See Comments)  PT DOESN'T REMEMBER  PT DOESN'T REMEMBER  Other reaction(s): Unknown (See Comments)  PT DOESN'T REMEMBER         ROS:   Review of Systems   Constitutional: Negative. HENT: Negative. Eyes: Negative. Respiratory: Negative. Cardiovascular: Negative. Gastrointestinal: Negative. Endocrine: Negative. Genitourinary: Negative. Musculoskeletal: Negative. Skin: Negative. Allergic/Immunologic: Negative. Neurological: Negative. Hematological: Negative. Psychiatric/Behavioral: Negative. Objective:     Physical Exam:   Vitals:    02/20/22 0815   BP: 119/69   Pulse: 97   Resp: 28   Temp: 98 °F (36.7 °C)   SpO2: 100%     I/O last 3 completed shifts:   In: 80 [P.O.:180]  Out: 2400    General appearance: alert, appears stated age and cooperative  HEENT: PERRLA  Neck: no adenopathy, no carotid bruit, no JVD, supple, symmetrical, trachea midline and thyroid not enlarged, symmetric, no tenderness/mass/nodules  Lungs: clear to auscultation bilaterally  Heart: regular rate and rhythm, S1, S2 normal, no murmur, click, rub or gallop  Abdomen: soft, non-tender; bowel sounds normal; no masses,  no organomegaly  Extremities: extremities normal, atraumatic, no cyanosis or edema     Lab and Imaging Review   Labs:  CBC:   Recent Labs     02/18/22  0803 02/19/22  0430 02/20/22  0449   WBC 9.4 8.0 6.5   HGB 8.9* 9.0* 8.6*   HCT 28.0* 29.0* 27.7*   MCV 94.4 97.0 95.8    244 285     BMP:   Recent Labs     02/18/22  0803 02/19/22  0430 02/20/22  0449   * 135* 136   K 4.8 4.8 4.1   CL 93* 97* 97*   CO2 22 22 26   PHOS 5.6*  --   --    BUN 79* 49* 33*   CREATININE 7.8* 6.0* 4.7*     LIVER PROFILE: No results for input(s): AST, ALT, LIPASE, PROT, BILIDIR, BILITOT, ALKPHOS in the last 72 hours. Invalid input(s): AMYLASE,  ALB  PT/INR:   Recent Labs     02/18/22  0803 02/19/22  0430 02/20/22  0449   INR 4.32* 4.78* 3.58*       IMAGING:  No results found. Hospital Problems           Last Modified POA    Pneumonia due to infectious organism 2/17/2022 Yes    Acute on chronic respiratory failure with hypoxia (HCC) 2/17/2022 Yes    Acute pulmonary edema ([address] 2/17/2022 Yes    Rectal pain 2/17/2022 Yes    History of CVA (cerebrovascular accident) 2/17/2022 Yes    HCAP (healthcare-associated pneumonia) 2/18/2022 Yes        Assessment:   38 yo morbidly obese female with RLL HCAP, CHF, CAD, ESRD, SILVER, pulmonary hypertension with rectal pain likely secondary to anal fissure    Plan:   1. Minimize straining and toileting time  2. Stool softener: colace 100 mg bid, dc lactulose. Consider miralax if needed  3. Nifedipine/lidocaine ointment bid to rectum to assist with healing x 8 weeks.       Call if further questions or concerns      Parker Walters DO  9:53 AM 2/20/2022            [address] [place] [address]    [address]      [address] [place] [address]     Phone: [phone]     Fax: [phone]

## 2022-02-20 NOTE — PROGRESS NOTES
Shift assessment complete- see flow sheet. Patient is A/Ox4. VSS. Morning medications given without difficulty whole with thin liquids. Currently on chronic trach/vent, cuff deflated for eating. No choking during breakfast today. SpO2 WNL. Lung sounds clear. Denies shortness of breath. C/o rectal pain this morning. GI to see patient today. Patient denies any further needs. Call light explained and in reach. Bed alarm on. Will continue to monitor.

## 2022-02-20 NOTE — PROGRESS NOTES
Progress Note    Admit Date:  2/17/2022      Subjective:    Feels well. Reports rectal pain when sitting prolonged time ( as in during dialysis) or after defecation. SOB resolved. Trach to vent baseline settings. Tolerating PO. Objective:   Patient Vitals for the past 4 hrs:   SpO2   02/20/22 1144 100 %            Intake/Output Summary (Last 24 hours) at 2/20/2022 1348  Last data filed at 2/20/2022 1254  Gross per 24 hour   Intake 402 ml   Output --   Net 402 ml       Physical Exam:    Gen:  obese AA female on vent, fully awake, alert and oriented . Nonverbal. Morbidly obese. Eyes: PERRL. No sclera icterus. No conjunctival injection. ENT: No discharge. Trachea midline. Tracheostomy vent in place. Resp: No accessory muscle use. No crackles. No wheezes. Diminished in bases  CV: Regular rate. Regular rhythm. No murmur. No rub. No edema. Capillary Refill: Brisk,< 3 seconds   Peripheral Pulses: +2 palpable, equal bilaterally   GI: morbidly obese, Non-tender. Non-distended. No masses. No organomegaly. Normal bowel sounds. No hernia. Skin: Warm and dry. No nodule on exposed extremities. No rash on exposed extremities. M/S: atrophic lower ext ,  No joint deformity. No clubbing. Neuro: Awake oriented .  Grossly nonfocal    Pt is non ambulatory     Scheduled Meds:   rectal mixture builder   Rectal BID    glycerin (ADULT)  1 suppository Rectal Daily    lidocaine   Topical BID    epoetin claire-epbx  16,000 Units IntraVENous Once per day on Tue Thu Sat    doxercalciferol  6 mcg IntraVENous Once per day on Tue Thu Sat    metoprolol succinate  12.5 mg Oral Daily    LORazepam  0.5 mg IntraVENous BID    cefepime  1,000 mg IntraVENous Q24H    heparin (porcine)  5,000 Units SubCUTAneous 3 times per day    insulin lispro  0-12 Units SubCUTAneous TID WC    insulin lispro  0-6 Units SubCUTAneous Nightly    warfarin placeholder: dosing by pharmacy   Other RX Placeholder    [Held by provider] ALPRAZolam  0.5 mg Oral BID    aspirin  81 mg Oral Daily    calcium acetate  1,334 mg Oral TID     chlorhexidine  15 mL Mouth/Throat BID    cyanocobalamin  500 mcg Oral Daily    docusate sodium  100 mg Oral BID    furosemide  20 mg Oral Once per day on Mon Wed Fri    hydrocortisone  25 mg Rectal BID    hydrocortisone   Topical BID    insulin glargine  15 Units SubCUTAneous Daily    midodrine  10 mg Oral Once per day on Mon Wed Fri    pantoprazole  40 mg Oral QAM AC    QUEtiapine  12.5 mg Oral Nightly    sennosides-docusate sodium  2 tablet Oral Nightly    sertraline  50 mg Oral Daily    sodium polystyrene  15 g Oral Once per day on Sun Mon Wed Fri    doxazosin  1 mg Oral Nightly    atorvastatin  80 mg Oral Nightly    ipratropium-albuterol  1 ampule Inhalation Q4H       Continuous Infusions:   dextrose         PRN Meds:  morphine, ondansetron **OR** ondansetron, polyethylene glycol, glucose, glucagon (rDNA), dextrose, dextrose bolus (hypoglycemia) **OR** dextrose bolus (hypoglycemia), diphenhydrAMINE, [Held by provider] HYDROcodone-acetaminophen, lactulose, midodrine, acetaminophen **OR** acetaminophen      Data:  CBC:   Recent Labs     02/18/22  0803 02/19/22  0430 02/20/22 0449   WBC 9.4 8.0 6.5   HGB 8.9* 9.0* 8.6*   HCT 28.0* 29.0* 27.7*   MCV 94.4 97.0 95.8    244 285     BMP:   Recent Labs     02/18/22  0803 02/19/22  0430 02/20/22 0449   * 135* 136   K 4.8 4.8 4.1   CL 93* 97* 97*   CO2 22 22 26   PHOS 5.6*  --   --    BUN 79* 49* 33*   CREATININE 7.8* 6.0* 4.7*     LIVER PROFILE:   No results for input(s): AST, ALT, LIPASE, BILIDIR, BILITOT, ALKPHOS in the last 72 hours. Invalid input(s): AMYLASE,  ALB  PT/INR:   Recent Labs     02/18/22  0803 02/19/22  0430 02/20/22 0449   PROTIME 51.9* 57.6* 42.6*   INR 4.32* 4.78* 3.58*       CULTURES  Results for Caron Luna (MRN [id number]) as of 2/17/2022 11:35    Ref.  Range 2/17/2022 09:20   INFLUENZA A Latest Ref Range: NOT DETECTED  NOT DETECTED   INFLUENZA B Latest Ref Range: NOT DETECTED  NOT DETECTED   SARS-CoV-2 RNA, RT PCR Latest Ref Range: NOT DETECTED  NOT DETECTED         Echo 2/18/2022  Summary   Definity contrast administered. Left ventricular systolic function is mildly reduced with visually estimated   EF of 45%. Endocardium not entirely well visualized but no obvious segmental wall   motion abnormalities. Diastolic dysfunction grade and filling pressure are indeterminate. Mild concentric left ventricular hypertrophy. Unable to obtain SPAP due to lack of tricuspid regurgitation. Valves are not well visualized but there are no obvious structural   abnormalities or color flow abnormalities seen on doppler. 9- [facility]. 60%    Pertinent previous results reviewed   ECHO 9/30/19 [employer]  Narrative     · Estimated EF = 60%. · Left ventricular systolic function is normal.       RADIOLOGY  XR CHEST PORTABLE   Final Result   CHF with pulmonary edema and or superimposed pneumonia. Assessment/Plan:    #Unresponsiveness and ? Syncope  -Acute on chronic resp failure vs fluid overload   - now awake, alert and conversant. - unsure what happened at her ECF.    - Mentation back to baseline now.        #ESRD on HD   -Has missed last 3 dialysis sessions as reported by [facility] RN due to refusal  -Fluid overloaded, Pulmonary edema on CXR  -Pro-BNP elevated >15,000, previous was 3096  -Nephrology consulted; had  HD and ongoing aggressive fluid removal   -Requires midodrine for HD        #Acute on chronic respiratory failure with chronic trach vent  #Pulmonary HTN  Suspected VAP  -Reportedly desaturated during [address] transition at [facility] with unresponsiveness - likely with splinting of chest   -Bagged to 99% and patient had return of conciousness  -Pulm consulted; vent settings per pulm  -Pulm edema vs superimposed pna on CXR  -PCT 0.96  -Vacno and cefepime D#4   - cultures with acinetobacter, MDRO pseudomonas and proteus. - due to MDRO VAP I asked ID to eval for Abx recommendations.        #Elevated troponin  -0.20--0.39--0.37  -No CP  -EKG with no ischemia  -Likely 2/2 ESRD  - cardiology consulted      #Chronic dCHF no ae  #New onset cardiomyopathy   -BNP elevated  -Fluid overloaded but likely 2/2 need for dialysis  -EKG with no ischemia  -Cardiology consult;   - echo showed decrease in EF 45%  - recommend medical management. No ACE/ARB due to ESRD add low dose toprol as blood pressure allows       #Rectal pain; hemorrhoids   -Chronic, x3 years per daugther  -Has been worked up for this as OP  -Continue pain control and bowel regimen  -General Surgery Consulted as patient is unable to tolerate dialysis at times due to rectal pain. - no visible external source of pain. - possibly rectal tear/stercoral colitis/proctitis. - cont bowel regimen and stool softeners. - urojet and proctofoam for symptom control.        #CAD  -Continue ASA, statin       #Hx of CVA  -Pharm to dose warfarin        #HLD  -On statin       #DMII  -Lantus 15 units daily  -Med dose SSI  -POCT glucose       #GERD  -Continue PPI       #SILVER  -On chronic trach vent       #Mood disorder  -Continue psych meds       #Morbid Obesity  -Complicating assessment and treatment. Placing patient at risk for multiple co-morbidities as well as early death and contributing to the patient's presentation.   - Recommend weight loss. DVT Prophylaxis: coumadin   Diet: ADULT DIET; Regular; 4 carb choices (60 gm/meal); Low Potassium (Less than 3000 mg/day);  Low Phosphorus (Less than 1000 mg)- - resume oral diet once eval done by SLP  Code Status: Full Code         Steve Sánchez MD, 2/20/2022 1:48 PM

## 2022-02-20 NOTE — PROGRESS NOTES
Progress Note    Admit Date:  2/17/2022      Subjective:    HD today. Objective:   Patient Vitals for the past 4 hrs:   SpO2   02/20/22 1144 100 %            Intake/Output Summary (Last 24 hours) at 2/20/2022 1347  Last data filed at 2/20/2022 1254  Gross per 24 hour   Intake 402 ml   Output --   Net 402 ml       Physical Exam:    Gen:  obese AA female on vent, fully awake, alert and oriented . Nonverbal. Morbidly obese. Eyes: PERRL. No sclera icterus. No conjunctival injection. ENT: No discharge. Trachea midline. Tracheostomy vent in place. Resp: No accessory muscle use. No crackles. No wheezes. Diminished in bases  CV: Regular rate. Regular rhythm. No murmur. No rub. No edema. Capillary Refill: Brisk,< 3 seconds   Peripheral Pulses: +2 palpable, equal bilaterally   GI: morbidly obese, Non-tender. Non-distended. No masses. No organomegaly. Normal bowel sounds. No hernia. Skin: Warm and dry. No nodule on exposed extremities. No rash on exposed extremities. M/S: atrophic lower ext ,  No joint deformity. No clubbing. Neuro: Awake oriented .  Grossly nonfocal    Pt is non ambulatory     Scheduled Meds:   rectal mixture builder   Rectal BID    glycerin (ADULT)  1 suppository Rectal Daily    lidocaine   Topical BID    epoetin claire-epbx  16,000 Units IntraVENous Once per day on Tue Thu Sat    doxercalciferol  6 mcg IntraVENous Once per day on Tue Thu Sat    metoprolol succinate  12.5 mg Oral Daily    LORazepam  0.5 mg IntraVENous BID    cefepime  1,000 mg IntraVENous Q24H    heparin (porcine)  5,000 Units SubCUTAneous 3 times per day    insulin lispro  0-12 Units SubCUTAneous TID     insulin lispro  0-6 Units SubCUTAneous Nightly    warfarin placeholder: dosing by pharmacy   Other RX Placeholder    [Held by provider] ALPRAZolam  0.5 mg Oral BID    aspirin  81 mg Oral Daily    calcium acetate  1,334 mg Oral TID WC    chlorhexidine  15 mL Mouth/Throat BID    cyanocobalamin  500 mcg Oral Daily    docusate sodium  100 mg Oral BID    furosemide  20 mg Oral Once per day on Mon Wed Fri    hydrocortisone  25 mg Rectal BID    hydrocortisone   Topical BID    insulin glargine  15 Units SubCUTAneous Daily    midodrine  10 mg Oral Once per day on Mon Wed Fri    pantoprazole  40 mg Oral QAM AC    QUEtiapine  12.5 mg Oral Nightly    sennosides-docusate sodium  2 tablet Oral Nightly    sertraline  50 mg Oral Daily    sodium polystyrene  15 g Oral Once per day on Sun Mon Wed Fri    doxazosin  1 mg Oral Nightly    atorvastatin  80 mg Oral Nightly    ipratropium-albuterol  1 ampule Inhalation Q4H       Continuous Infusions:   dextrose         PRN Meds:  morphine, ondansetron **OR** ondansetron, polyethylene glycol, glucose, glucagon (rDNA), dextrose, dextrose bolus (hypoglycemia) **OR** dextrose bolus (hypoglycemia), diphenhydrAMINE, [Held by provider] HYDROcodone-acetaminophen, lactulose, midodrine, acetaminophen **OR** acetaminophen      Data:  CBC:   Recent Labs     02/18/22 0803 02/19/22 0430 02/20/22 0449   WBC 9.4 8.0 6.5   HGB 8.9* 9.0* 8.6*   HCT 28.0* 29.0* 27.7*   MCV 94.4 97.0 95.8    244 285     BMP:   Recent Labs     02/18/22 0803 02/19/22 0430 02/20/22 0449   * 135* 136   K 4.8 4.8 4.1   CL 93* 97* 97*   CO2 22 22 26   PHOS 5.6*  --   --    BUN 79* 49* 33*   CREATININE 7.8* 6.0* 4.7*     LIVER PROFILE:   No results for input(s): AST, ALT, LIPASE, BILIDIR, BILITOT, ALKPHOS in the last 72 hours. Invalid input(s): AMYLASE,  ALB  PT/INR:   Recent Labs     02/18/22 0803 02/19/22 0430 02/20/22 0449   PROTIME 51.9* 57.6* 42.6*   INR 4.32* 4.78* 3.58*       CULTURES  Results for Anna Moore (MRN [id number]) as of 2/17/2022 11:35    Ref.  Range 2/17/2022 09:20   INFLUENZA A Latest Ref Range: NOT DETECTED  NOT DETECTED   INFLUENZA B Latest Ref Range: NOT DETECTED  NOT DETECTED   SARS-CoV-2 RNA, RT PCR Latest Ref Range: NOT DETECTED  NOT DETECTED         Echo 2/18/2022  Summary   Definity contrast administered. Left ventricular systolic function is mildly reduced with visually estimated   EF of 45%. Endocardium not entirely well visualized but no obvious segmental wall   motion abnormalities. Diastolic dysfunction grade and filling pressure are indeterminate. Mild concentric left ventricular hypertrophy. Unable to obtain SPAP due to lack of tricuspid regurgitation. Valves are not well visualized but there are no obvious structural   abnormalities or color flow abnormalities seen on doppler. 9- [facility]. 60%    Pertinent previous results reviewed   ECHO 9/30/19 [employer]  Narrative     · Estimated EF = 60%. · Left ventricular systolic function is normal.       RADIOLOGY  XR CHEST PORTABLE   Final Result   CHF with pulmonary edema and or superimposed pneumonia. Assessment/Plan:    #Unresponsiveness and ? Syncope  -Acute on chronic resp failure vs fluid overload   -Alert now although not at baseline per family and [facility] RN  - unsure what happened at her ECF.  Mentation stable since arrival      #ESRD on HD   -Has missed last 3 dialysis sessions as reported by [facility] RN due to refusal  -Fluid overloaded, Pulmonary edema on CXR  -Pro-BNP elevated >15,000, previous was 6377  -Unclear baseline Crt and GFR but kidney fuction appears to be declined  -Nephrology consulted; had  HD and ongoing aggressive fluid removal   -Requires midodrine for HD        #Acute on chronic respiratory failure with chronic trach vent  #Pulmonary HTN    -Reportedly desaturated during [address] transition at [facility] with unresponsiveness - likely with splinting of chest   -Bagged to 99% and patient had return of conciousness  -Pulm consulted; vent settings per pulm  -Pulm edema vs superimposed pna on CXR  -PCT 0.96  -Vacno and cefepime D#2   -Continue home nebs  - given quick improvement in symptoms with bagging yesterday and now with fluid removal, I favor fluid overload rather than pna  - can stop abx       #Elevated troponin  -0.20--0.39--0.37  -No CP  -EKG with no ischemia  -Likely 2/2 ESRD  - cardiology consulted    #Chronic dCHF no ae  #New onset cardiomyopathy   -BNP elevated  -Fluid overloaded but likely 2/2 need for dialysis  -EKG with no ischemia  -Cardiology consult; will repeat ECHO  - echo today showed decrease in EF 45%  - recommend medical management. No ACE/ARB due to ESRD add low dose toprol as blood pressure allows     #Rectal pain; hemorrhoids   -Chronic, x3 years per daugther  -Has been worked up for this as OP  -Continue pain control and bowel regimen  -General Surgery Consulted as patient is unable to tolerate dialysis at times due to rectal pain.     #CAD  -Continue ASA, statin     #Hx of CVA  -Pharm to dose warfarin      #HLD  -On statin     #DMII  -Lantus 15 units daily  -Med dose SSI  -POCT glucose     #GERD  -Continue PPI     #SILVER  -On chronic trach vent     #Mood disorder  -Continue psych meds     #Morbid Obesity  -Complicating assessment and treatment. Placing patient at risk for multiple co-morbidities as well as early death and contributing to the patient's presentation.   - Recommend weight loss. DVT Prophylaxis: coumadin   Diet: ADULT DIET; Regular; 4 carb choices (60 gm/meal); Low Potassium (Less than 3000 mg/day);  Low Phosphorus (Less than 1000 mg)- - resume oral diet once eval done by SLP  Code Status: Full Code       Shayy Das MD, 2/20/2022 1:47 PM

## 2022-02-20 NOTE — FLOWSHEET NOTE
02/19/22 2125   Vital Signs   Temp 98.3 °F (36.8 °C)   Temp Source Oral   Pulse 95   Heart Rate Source Monitor   Resp 22   /66   BP Location Left upper arm   Patient Position High fowlers   Level of Consciousness Alert (0)   MEWS Score 2   Patient Currently in Pain Yes   Pain Assessment   Pain Assessment 0-10   Pain Level 8   Patient's Stated Pain Goal No pain   Pain Type Acute pain   Pain Location Buttocks   Oxygen Therapy   SpO2 99 %   O2 Device Ventilator     Shift assessment completed, see flow sheet. Patient is A&O x4. Medications administered. Patient denies further needs at this time. Call light within reach. Will continue to monitor.

## 2022-02-20 NOTE — PROGRESS NOTES
02/20/22 0341   Vent Information   Vent Type 840   Vent Mode AC/VC   Vt Ordered 355 mL   Rate Set 22 bmp   Peak Flow 50 L/min   FiO2  30 %   SpO2 99 %   SpO2/FiO2 ratio 330   Sensitivity 3   PEEP/CPAP 5   Humidification Source Heated wire   Humidification Temp Measured 37   Circuit Condensation Drained   Vent Patient Data   Peak Inspiratory Pressure 21 cmH2O   Mean Airway Pressure 11 cmH20   Rate Measured 22 br/min   Vt Exhaled 376 mL   Minute Volume 8.2 Liters   I:E Ratio 1:2.5   Cough/Sputum   Sputum How Obtained Tracheal   Cough Productive   Sputum Amount Moderate   Sputum Color Dark red   Tenacity Thick   Breath Sounds   Right Upper Lobe Diminished   Right Middle Lobe Diminished   Right Lower Lobe Diminished   Left Upper Lobe Diminished   Left Lower Lobe Diminished   Alarm Settings   High Pressure Alarm 55 cmH2O   Low Minute Volume Alarm 2.5 L/min   Apnea (secs) 20 secs   High Respiratory Rate 45 br/min   Low Exhaled Vt  200 mL   Patient Observation   Observations dawna 6 xlt distal

## 2022-02-20 NOTE — PROGRESS NOTES
[employer]  Nephrology Follow up Note           Reason for Consult:  ESRD  Requesting Physician:  Dr. Chris Beatty history  resting    HD on 2/19 with UF of 2l over 3 hours, post weight 136.3 kg  HD on 2/18 with 4.5 L UF over 3.5 hours, post weight 139.2 kg    ROS: No chest pain/shortness of breath/fever/nausea/vomiting  PSFH: No visitor    Scheduled Meds:   rectal mixture builder   Rectal BID    glycerin (ADULT)  1 suppository Rectal Daily    lidocaine   Topical BID    epoetin claire-epbx  16,000 Units IntraVENous Once per day on Tue Thu Sat    doxercalciferol  6 mcg IntraVENous Once per day on Tue Thu Sat    metoprolol succinate  12.5 mg Oral Daily    LORazepam  0.5 mg IntraVENous BID    cefepime  1,000 mg IntraVENous Q24H    heparin (porcine)  5,000 Units SubCUTAneous 3 times per day    insulin lispro  0-12 Units SubCUTAneous TID WC    insulin lispro  0-6 Units SubCUTAneous Nightly    warfarin placeholder: dosing by pharmacy   Other RX Placeholder    [Held by provider] ALPRAZolam  0.5 mg Oral BID    aspirin  81 mg Oral Daily    calcium acetate  1,334 mg Oral TID WC    chlorhexidine  15 mL Mouth/Throat BID    cyanocobalamin  500 mcg Oral Daily    docusate sodium  100 mg Oral BID    furosemide  20 mg Oral Once per day on Mon Wed Fri    hydrocortisone  25 mg Rectal BID    hydrocortisone   Topical BID    insulin glargine  15 Units SubCUTAneous Daily    midodrine  10 mg Oral Once per day on Mon Wed Fri    pantoprazole  40 mg Oral QAM AC    QUEtiapine  12.5 mg Oral Nightly    sennosides-docusate sodium  2 tablet Oral Nightly    sertraline  50 mg Oral Daily    sodium polystyrene  15 g Oral Once per day on Sun Mon Wed Fri    doxazosin  1 mg Oral Nightly    atorvastatin  80 mg Oral Nightly    ipratropium-albuterol  1 ampule Inhalation Q4H     Continuous Infusions:   dextrose       PRN Meds:.morphine, ondansetron **OR** ondansetron, polyethylene glycol, glucose, glucagon (rDNA), dextrose, dextrose bolus (hypoglycemia) **OR** dextrose bolus (hypoglycemia), diphenhydrAMINE, [Held by provider] HYDROcodone-acetaminophen, lactulose, midodrine, acetaminophen **OR** acetaminophen    History of Present Illness on 2/17/22:    80 y.o. yo female with PMH of ESRD on HD, chronic resp failure on trach/vent since 2/2021, HTN, COVID pneumonia, DVT on coumadin  who is admitted for loss of consciousness  Pt has been t/f to  [facility] from [place] [address] last year as she has been needing HD and is trach/vent  She usually cuts her HD session short and misses HD frequently. She says her hemorrhoids? give a lot of pain while laying on her bed during hD  Today she did dialysis for 1.5h and removed 300 ml only as she asked to come off HD. She became unresponsive when being t/f from HDU to the nursing home and had to bagged for few minutes before she regained consciousness. Apparently she had been on SBT for a week or so but currently is back on the vent. cxr shows chf and pneumonia    Physical exam:   Constitutional:  VITALS:  /66   Pulse 89   Temp 100 °F (37.8 °C) (Oral)   Resp 20   Ht 5' 4\" (1.626 m)   Wt (!) 300 lb 7.8 oz (136.3 kg)   SpO2 100%   BMI 51.58 kg/m²   Gen: alert, awake  Cardiovascular:  S1, S2 without m/r/g no lower extremity edema  Respiratory: decreased breath sounds ; trach in situ  Abdomen:  soft, nt, nd,   Neuro/Psy: AAoriented times 3 ; moves all 4 ext    Data/  Recent Labs     02/18/22  0803 02/19/22  0430 02/20/22  0449   WBC 9.4 8.0 6.5   HGB 8.9* 9.0* 8.6*   HCT 28.0* 29.0* 27.7*   MCV 94.4 97.0 95.8    244 285     Recent Labs     02/18/22  0803 02/19/22  0430 02/20/22  0449   * 135* 136   K 4.8 4.8 4.1   CL 93* 97* 97*   CO2 22 22 26   GLUCOSE 106* 87 97   PHOS 5.6*  --   --    BUN 79* 49* 33*   CREATININE 7.8* 6.0* 4.7*   LABGLOM 5* 7* 9*   GFRAA 6* 8* 11*   Echocardiogram  Summary   Definity contrast administered.    Left ventricular systolic function is mildly reduced with visually estimated   EF of 45%. Endocardium not entirely well visualized but no obvious segmental wall   motion abnormalities. Diastolic dysfunction grade and filling pressure are indeterminate. Mild concentric left ventricular hypertrophy. Unable to obtain SPAP due to lack of tricuspid regurgitation. Valves are not well visualized but there are no obvious structural   abnormalities or color flow abnormalities seen on doppler. 9- [facility]. 60%    Assessment  -ESRD on HD TTS at [facility]   Has h/o intra dialytic hypotension and is on midodrine    -Acute on chronic resp failure likely from pneumonia and fluid overload   Currently trach and vent    -Anemia    -leukocytosis/sepsis w HCAP   Respiratory culture growing Acinetobacter Baaumani and Proteus, Pseudomonas on cefepime    -CKD/MBD    -Rectal pain/hemorrhoids    Plan  -HD  per TTS schedule  -epo 16k w hd and hecterol 6 mcg w HD   -renal dose meds  -Appreciate general surgery and GI input regarding her rectal pain which is keeping her from staying on dialysis    Thank you for the consultation. Please do not hesitate to call with questions. Sherie Uribe MD  Office: [phone]  Fax:    [phone] [address] [facility]

## 2022-02-20 NOTE — CONSULTS
Nifedipine Rectal ointment compounded per Dr. Ramirez Stephie request.  Patient will likely need a prescription sent to a compounding pharmacy prior to discharge such as [facility] in [place].   Doris Baires [facility] PharmD 2/20/2022 3:14 PM

## 2022-02-20 NOTE — PROGRESS NOTES
Pharmacy Routine Monitoring of Warfarin (INR)  Active order for anticoagulants/antiplatelets: 537  Significant drug interactions: No obvious interactions  Goal INR: 2 - 3  Recent Labs     02/17/22  0725 02/17/22  0725 02/18/22  0803 02/19/22  0430 02/20/22  0449   INR 3.78*   < > 4.32* 4.78* 3.58*   HGB 9.7*   < > 8.9* 9.0* 8.6*   LABALBU 3.5  --  3.1*  --   --     < > = values in this interval not displayed. Date INR Dose  2/17     3.78     Hold  2/18     4.32     Hold  2/19     4.78     Hold  2/20     3.58     Hold       Assessment/Plan:       INR orders are placed. Hold warfarin today and continue to follow .      Britney Woodard, PharmD, [facility], 2/20/2022 6:38 AM

## 2022-02-20 NOTE — PROGRESS NOTES
02/20/22 0011   Vent Information   $Ventilation $Subsequent Day   Vent Type 840   Vent Mode AC/VC   Vt Ordered 355 mL   Rate Set 22 bmp   Peak Flow 50 L/min   FiO2  30 %   SpO2 99 %   SpO2/FiO2 ratio 330   Sensitivity 3   PEEP/CPAP 5   Humidification Source Heated wire   Humidification Temp Measured 37   Circuit Condensation Drained   Vent Patient Data   Peak Inspiratory Pressure 15 cmH2O   Mean Airway Pressure 9 cmH20   Rate Measured 24 br/min   Vt Exhaled 366 mL   Minute Volume 9 Liters   I:E Ratio 1:2.4   Cough/Sputum   Sputum How Obtained Tracheal   Cough Productive   Sputum Amount Moderate   Sputum Color Dark red   Tenacity Thick   Breath Sounds   Right Upper Lobe Diminished   Right Middle Lobe Diminished   Right Lower Lobe Diminished   Left Upper Lobe Diminished   Left Lower Lobe Diminished   Alarm Settings   High Pressure Alarm 55 cmH2O   Low Minute Volume Alarm 2.5 L/min   Apnea (secs) 20 secs   High Respiratory Rate 45 br/min   Low Exhaled Vt  200 mL   Patient Observation   Observations dawna 6 xlt distal

## 2022-02-20 NOTE — PROGRESS NOTES
Pulmonary Progress Note    CC: Mechanical ventilation    Subjective: Tolerating tracheostomy cuff deflation  Able to verbalize some words  30% FiO2      IV line: Peripheral IVs      Intake/Output Summary (Last 24 hours) at 2/20/2022 0747  Last data filed at 2/19/2022 1700  Gross per 24 hour   Intake 580 ml   Output 2400 ml   Net -1820 ml       Exam:   BP (!) 100/53   Pulse 88   Temp 98.1 °F (36.7 °C) (Axillary)   Resp 20   Ht 5' 4\" (1.626 m)   Wt (!) 300 lb 7.8 oz (136.3 kg)   SpO2 99%   BMI 51.58 kg/m²  on 30%  General: ill appearing. On mechanical ventilation  Eyes: PERRL. No sclera icterus. No conjunctival injection. ENT: No discharge. Pharynx clear. Tracheostomy tube in place  Neck: Trachea midline. Normal thyroid. Resp: No accessory muscle use. Few crackles. No wheezing. Few rhonchi. CV: Regular rate. Regular rhythm. No mumur or rub. No edema. GI: Non-tender. Non-distended. No masses. Skin: Warm and dry. No nodule on exposed extremities. No rash on exposed extremities. Lymph: No cervical LAD. No supraclavicular LAD. M/S: No cyanosis. No joint deformity. No clubbing. Neuro: On mechanical ventilation. Following commands. Alert and awake. Psych: No agitation or anxiety.      Scheduled Meds:   lidocaine   Topical BID    epoetin claire-epbx  16,000 Units IntraVENous Once per day on Tue Thu Sat    doxercalciferol  6 mcg IntraVENous Once per day on Tue Thu Sat    metoprolol succinate  12.5 mg Oral Daily    LORazepam  0.5 mg IntraVENous BID    sodium chloride flush  5-40 mL IntraVENous 2 times per day    cefepime  1,000 mg IntraVENous Q24H    heparin (porcine)  5,000 Units SubCUTAneous 3 times per day    insulin lispro  0-12 Units SubCUTAneous TID WC    insulin lispro  0-6 Units SubCUTAneous Nightly    warfarin placeholder: dosing by pharmacy   Other RX Placeholder    [Held by provider] ALPRAZolam  0.5 mg Oral BID    aspirin  81 mg Oral Daily    calcium acetate  1,334 mg Oral TID WC    chlorhexidine  15 mL Mouth/Throat BID    cyanocobalamin  500 mcg Oral Daily    dextromethorphan-guaiFENesin  1 tablet Oral BID    docusate sodium  100 mg Oral BID    furosemide  20 mg Oral Once per day on Mon Wed Fri    hydrocortisone  25 mg Rectal BID    hydrocortisone   Topical BID    insulin glargine  15 Units SubCUTAneous Daily    midodrine  10 mg Oral Once per day on Mon Wed Fri    pantoprazole  40 mg Oral QAM AC    QUEtiapine  12.5 mg Oral Nightly    sennosides-docusate sodium  2 tablet Oral Nightly    sertraline  50 mg Oral Daily    sodium polystyrene  15 g Oral Once per day on Sun Mon Wed Fri    doxazosin  1 mg Oral Nightly    atorvastatin  80 mg Oral Nightly    ipratropium-albuterol  1 ampule Inhalation Q4H     Continuous Infusions:   sodium chloride      dextrose       PRN Meds:  morphine, sodium chloride flush, sodium chloride, ondansetron **OR** ondansetron, polyethylene glycol, glucose, glucagon (rDNA), dextrose, dextrose bolus (hypoglycemia) **OR** dextrose bolus (hypoglycemia), diphenhydrAMINE, [Held by provider] HYDROcodone-acetaminophen, lactulose, midodrine, acetaminophen **OR** acetaminophen    Labs:  CBC:   Recent Labs     02/18/22  0803 02/19/22  0430 02/20/22 0449   WBC 9.4 8.0 6.5   HGB 8.9* 9.0* 8.6*   HCT 28.0* 29.0* 27.7*   MCV 94.4 97.0 95.8    244 285     BMP:   Recent Labs     02/18/22  0803 02/19/22  0430 02/20/22 0449   * 135* 136   K 4.8 4.8 4.1   CL 93* 97* 97*   CO2 22 22 26   PHOS 5.6*  --   --    BUN 79* 49* 33*   CREATININE 7.8* 6.0* 4.7*     LIVER PROFILE:   No results for input(s): AST, ALT, LIPASE, BILIDIR, BILITOT, ALKPHOS in the last 72 hours. Invalid input(s): AMYLASE,  ALB  PT/INR:   Recent Labs     02/18/22  0803 02/19/22 0430 02/20/22 0449   PROTIME 51.9* 57.6* 42.6*   INR 4.32* 4.78* 3.58*     APTT: No results for input(s): APTT in the last 72 hours.   UA:No results for input(s): NITRITE, COLORU, PHUR, LABCAST, [address],

## 2022-02-21 ENCOUNTER — APPOINTMENT (OUTPATIENT)
Dept: GENERAL RADIOLOGY | Age: 72
DRG: 870 | End: 2022-02-21
Payer: COMMERCIAL

## 2022-02-21 VITALS
DIASTOLIC BLOOD PRESSURE: 75 MMHG | HEIGHT: 64 IN | HEART RATE: 90 BPM | BODY MASS INDEX: 49.8 KG/M2 | TEMPERATURE: 98 F | RESPIRATION RATE: 14 BRPM | OXYGEN SATURATION: 98 % | SYSTOLIC BLOOD PRESSURE: 120 MMHG | WEIGHT: 291.7 LBS

## 2022-02-21 PROBLEM — I82.622 ACUTE DEEP VEIN THROMBOSIS (DVT) OF BRACHIAL VEIN OF LEFT UPPER EXTREMITY (HCC): Status: RESOLVED | Noted: 2019-02-20 | Resolved: 2022-02-21

## 2022-02-21 PROBLEM — I63.9 CEREBELLAR INFARCT (HCC): Status: ACTIVE | Noted: 2019-09-29

## 2022-02-21 PROBLEM — R13.13 DYSPHAGIA, PHARYNGEAL PHASE: Status: ACTIVE | Noted: 2020-12-26

## 2022-02-21 PROBLEM — D63.8 ANEMIA, CHRONIC DISEASE: Status: ACTIVE | Noted: 2021-02-08

## 2022-02-21 PROBLEM — D62 ACUTE BLOOD LOSS ANEMIA: Status: ACTIVE | Noted: 2020-07-01

## 2022-02-21 PROBLEM — I82.622 ACUTE DEEP VEIN THROMBOSIS (DVT) OF BRACHIAL VEIN OF LEFT UPPER EXTREMITY (HCC): Status: ACTIVE | Noted: 2019-02-20

## 2022-02-21 PROBLEM — R07.9 CHEST PAIN: Status: RESOLVED | Noted: 2021-06-17 | Resolved: 2022-02-21

## 2022-02-21 PROBLEM — G47.33 OSA (OBSTRUCTIVE SLEEP APNEA): Status: ACTIVE | Noted: 2021-01-31

## 2022-02-21 PROBLEM — F41.9 ANXIETY: Status: ACTIVE | Noted: 2021-10-10

## 2022-02-21 PROBLEM — K64.9 BLEEDING HEMORRHOIDS: Status: ACTIVE | Noted: 2020-06-29

## 2022-02-21 PROBLEM — K64.9 BLEEDING HEMORRHOIDS: Status: RESOLVED | Noted: 2020-06-29 | Resolved: 2022-02-21

## 2022-02-21 PROBLEM — I63.9 CEREBELLAR INFARCT (HCC): Status: RESOLVED | Noted: 2019-09-29 | Resolved: 2022-02-21

## 2022-02-21 PROBLEM — I10 ESSENTIAL HYPERTENSION: Status: ACTIVE | Noted: 2017-08-03

## 2022-02-21 PROBLEM — D62 ACUTE BLOOD LOSS ANEMIA: Status: RESOLVED | Noted: 2020-07-01 | Resolved: 2022-02-21

## 2022-02-21 PROBLEM — I50.32 CHRONIC DIASTOLIC HEART FAILURE (HCC): Status: ACTIVE | Noted: 2018-10-23

## 2022-02-21 PROBLEM — K21.9 GERD (GASTROESOPHAGEAL REFLUX DISEASE): Status: ACTIVE | Noted: 2021-10-10

## 2022-02-21 PROBLEM — E66.01 MORBID OBESITY (HCC): Status: ACTIVE | Noted: 2021-10-10

## 2022-02-21 LAB
ANION GAP SERPL CALCULATED.3IONS-SCNC: 13 MMOL/L (ref 3–16)
BASOPHILS ABSOLUTE: 0 K/UL (ref 0–0.2)
BASOPHILS RELATIVE PERCENT: 0.7 %
BLOOD CULTURE, ROUTINE: NORMAL
BUN BLDV-MCNC: 41 MG/DL (ref 7–20)
CALCIUM SERPL-MCNC: 9.1 MG/DL (ref 8.3–10.6)
CHLORIDE BLD-SCNC: 97 MMOL/L (ref 99–110)
CO2: 24 MMOL/L (ref 21–32)
CREAT SERPL-MCNC: 5.8 MG/DL (ref 0.6–1.2)
CULTURE, BLOOD 2: NORMAL
EOSINOPHILS ABSOLUTE: 0.4 K/UL (ref 0–0.6)
EOSINOPHILS RELATIVE PERCENT: 7.2 %
GFR AFRICAN AMERICAN: 9
GFR NON-AFRICAN AMERICAN: 7
GLUCOSE BLD-MCNC: 107 MG/DL (ref 70–99)
GLUCOSE BLD-MCNC: 116 MG/DL (ref 70–99)
GLUCOSE BLD-MCNC: 116 MG/DL (ref 70–99)
GLUCOSE BLD-MCNC: 117 MG/DL (ref 70–99)
HCT VFR BLD CALC: 28.1 % (ref 36–48)
HEMOGLOBIN: 8.9 G/DL (ref 12–16)
INR BLD: 1.99 (ref 0.88–1.12)
LYMPHOCYTES ABSOLUTE: 0.9 K/UL (ref 1–5.1)
LYMPHOCYTES RELATIVE PERCENT: 14.5 %
MCH RBC QN AUTO: 30.3 PG (ref 26–34)
MCHC RBC AUTO-ENTMCNC: 31.5 G/DL (ref 31–36)
MCV RBC AUTO: 96.1 FL (ref 80–100)
MONOCYTES ABSOLUTE: 0.5 K/UL (ref 0–1.3)
MONOCYTES RELATIVE PERCENT: 8.1 %
NEUTROPHILS ABSOLUTE: 4.3 K/UL (ref 1.7–7.7)
NEUTROPHILS RELATIVE PERCENT: 69.5 %
PDW BLD-RTO: 19.3 % (ref 12.4–15.4)
PERFORMED ON: ABNORMAL
PLATELET # BLD: 289 K/UL (ref 135–450)
PMV BLD AUTO: 7.3 FL (ref 5–10.5)
POTASSIUM REFLEX MAGNESIUM: 4.1 MMOL/L (ref 3.5–5.1)
PROCALCITONIN: 9.36 NG/ML (ref 0–0.15)
PROTHROMBIN TIME: 23.1 SEC (ref 9.9–12.7)
RBC # BLD: 2.92 M/UL (ref 4–5.2)
SARS-COV-2, NAAT: NOT DETECTED
SODIUM BLD-SCNC: 134 MMOL/L (ref 136–145)
WBC # BLD: 6.1 K/UL (ref 4–11)

## 2022-02-21 PROCEDURE — 6360000002 HC RX W HCPCS: Performed by: INTERNAL MEDICINE

## 2022-02-21 PROCEDURE — 2500000003 HC RX 250 WO HCPCS: Performed by: NURSE PRACTITIONER

## 2022-02-21 PROCEDURE — 94761 N-INVAS EAR/PLS OXIMETRY MLT: CPT

## 2022-02-21 PROCEDURE — 36415 COLL VENOUS BLD VENIPUNCTURE: CPT

## 2022-02-21 PROCEDURE — 6360000002 HC RX W HCPCS: Performed by: NURSE PRACTITIONER

## 2022-02-21 PROCEDURE — 92526 ORAL FUNCTION THERAPY: CPT

## 2022-02-21 PROCEDURE — 6370000000 HC RX 637 (ALT 250 FOR IP): Performed by: NURSE PRACTITIONER

## 2022-02-21 PROCEDURE — 85025 COMPLETE CBC W/AUTO DIFF WBC: CPT

## 2022-02-21 PROCEDURE — 6370000000 HC RX 637 (ALT 250 FOR IP): Performed by: INTERNAL MEDICINE

## 2022-02-21 PROCEDURE — 99239 HOSP IP/OBS DSCHRG MGMT >30: CPT | Performed by: INTERNAL MEDICINE

## 2022-02-21 PROCEDURE — 2700000000 HC OXYGEN THERAPY PER DAY

## 2022-02-21 PROCEDURE — 84145 PROCALCITONIN (PCT): CPT

## 2022-02-21 PROCEDURE — 71045 X-RAY EXAM CHEST 1 VIEW: CPT

## 2022-02-21 PROCEDURE — 94640 AIRWAY INHALATION TREATMENT: CPT

## 2022-02-21 PROCEDURE — 99222 1ST HOSP IP/OBS MODERATE 55: CPT | Performed by: INTERNAL MEDICINE

## 2022-02-21 PROCEDURE — 85610 PROTHROMBIN TIME: CPT

## 2022-02-21 PROCEDURE — 99233 SBSQ HOSP IP/OBS HIGH 50: CPT | Performed by: INTERNAL MEDICINE

## 2022-02-21 PROCEDURE — 87635 SARS-COV-2 COVID-19 AMP PRB: CPT

## 2022-02-21 PROCEDURE — 2580000003 HC RX 258: Performed by: NURSE PRACTITIONER

## 2022-02-21 PROCEDURE — 80048 BASIC METABOLIC PNL TOTAL CA: CPT

## 2022-02-21 RX ORDER — WARFARIN SODIUM 5 MG/1
5 TABLET ORAL
Status: DISCONTINUED | OUTPATIENT
Start: 2022-02-21 | End: 2022-02-21 | Stop reason: HOSPADM

## 2022-02-21 RX ORDER — ALPRAZOLAM 0.5 MG/1
0.5 TABLET ORAL 2 TIMES DAILY
Qty: 10 TABLET | Refills: 0 | Status: SHIPPED | OUTPATIENT
Start: 2022-02-21 | End: 2022-02-26

## 2022-02-21 RX ORDER — HYDROCODONE BITARTRATE AND ACETAMINOPHEN 5; 325 MG/1; MG/1
1 TABLET ORAL EVERY 12 HOURS PRN
Qty: 6 TABLET | Refills: 0 | Status: SHIPPED | OUTPATIENT
Start: 2022-02-21 | End: 2022-02-24

## 2022-02-21 RX ORDER — METOPROLOL SUCCINATE 25 MG/1
12.5 TABLET, EXTENDED RELEASE ORAL DAILY
Qty: 30 TABLET | Refills: 3 | Status: ON HOLD | OUTPATIENT
Start: 2022-02-22 | End: 2022-05-21 | Stop reason: HOSPADM

## 2022-02-21 RX ADMIN — IPRATROPIUM BROMIDE AND ALBUTEROL SULFATE 1 AMPULE: 2.5; .5 SOLUTION RESPIRATORY (INHALATION) at 11:35

## 2022-02-21 RX ADMIN — MORPHINE SULFATE 2 MG: 2 INJECTION, SOLUTION INTRAMUSCULAR; INTRAVENOUS at 13:22

## 2022-02-21 RX ADMIN — CYANOCOBALAMIN TAB 500 MCG 500 MCG: 500 TAB at 08:44

## 2022-02-21 RX ADMIN — METOPROLOL SUCCINATE 12.5 MG: 25 TABLET, EXTENDED RELEASE ORAL at 08:44

## 2022-02-21 RX ADMIN — FUROSEMIDE 20 MG: 20 TABLET ORAL at 08:44

## 2022-02-21 RX ADMIN — ASPIRIN 81 MG: 81 TABLET, COATED ORAL at 08:44

## 2022-02-21 RX ADMIN — IPRATROPIUM BROMIDE AND ALBUTEROL SULFATE 1 AMPULE: 2.5; .5 SOLUTION RESPIRATORY (INHALATION) at 15:44

## 2022-02-21 RX ADMIN — IPRATROPIUM BROMIDE AND ALBUTEROL SULFATE 1 AMPULE: 2.5; .5 SOLUTION RESPIRATORY (INHALATION) at 07:21

## 2022-02-21 RX ADMIN — IPRATROPIUM BROMIDE AND ALBUTEROL SULFATE 1 AMPULE: 2.5; .5 SOLUTION RESPIRATORY (INHALATION) at 03:11

## 2022-02-21 RX ADMIN — CHLORHEXIDINE GLUCONATE 0.12% ORAL RINSE 15 ML: 1.2 LIQUID ORAL at 08:42

## 2022-02-21 RX ADMIN — HEPARIN SODIUM 5000 UNITS: 5000 INJECTION INTRAVENOUS; SUBCUTANEOUS at 06:35

## 2022-02-21 RX ADMIN — CALCIUM ACETATE 1334 MG: 667 CAPSULE ORAL at 08:44

## 2022-02-21 RX ADMIN — DOCUSATE SODIUM 100 MG: 100 CAPSULE ORAL at 08:44

## 2022-02-21 RX ADMIN — SERTRALINE HYDROCHLORIDE 50 MG: 50 TABLET ORAL at 08:44

## 2022-02-21 RX ADMIN — LORAZEPAM 0.5 MG: 2 INJECTION INTRAMUSCULAR; INTRAVENOUS at 08:41

## 2022-02-21 RX ADMIN — PANTOPRAZOLE SODIUM 40 MG: 40 TABLET, DELAYED RELEASE ORAL at 06:35

## 2022-02-21 RX ADMIN — CEFEPIME HYDROCHLORIDE 1000 MG: 1 INJECTION, POWDER, FOR SOLUTION INTRAMUSCULAR; INTRAVENOUS at 14:54

## 2022-02-21 ASSESSMENT — PAIN SCALES - GENERAL
PAINLEVEL_OUTOF10: 0
PAINLEVEL_OUTOF10: 0
PAINLEVEL_OUTOF10: 8

## 2022-02-21 ASSESSMENT — PULMONARY FUNCTION TESTS
PIF_VALUE: 21
PIF_VALUE: 22
PIF_VALUE: 27
PIF_VALUE: 19
PIF_VALUE: 20

## 2022-02-21 NOTE — PROGRESS NOTES
Speech Language Pathology  Facility/Department: [facility] PCU TELEMETRY  Dysphagia Daily Treatment Note  Recommendations:  Solid Consistency: IDDSI 6 Soft and Bite Sized Solids  Liquid Consistency: IDDSI 0 Thin Liquids  Medication: Meds whole with thin liquids    Risk Management: upright for all intake, stay upright for at least 30 mins after intake, small bites/sips, close supervision with intake, oral care 2-3x/day to reduce adverse affects in the event of aspiration, slow rate of intake, general aspiration precautions and hold PO and contact SLP if s/s of aspiration or worsening respiratory status develop. Recommend strict aspiration precautions and close supervision via RN and/or RT with all intake. Feed only if cuff is deflated. NAME: Jammie Marshall  : 1950  MRN: [id number]    Patient Diagnosis(es):   Patient Active Problem List    Diagnosis Date Noted    Abnormal chest x-ray     HCAP (healthcare-associated pneumonia)     Unresponsive episode     Syncope     Elevated troponin     Acute respiratory failure with hypoxia and hypercapnia ([address]     Pneumonia due to infectious organism     ESRD on dialysis ([address]     Chronic respiratory failure requiring continuous mechanical ventilation through tracheostomy ([address]     Acute on chronic respiratory failure with hypoxia (HCC)     Acute pulmonary edema (HCC)     Rectal pain     History of CVA (cerebrovascular accident)     Anxiety 10/10/2021    GERD (gastroesophageal reflux disease) 10/10/2021    Morbid obesity ([address] 10/10/2021    Anemia, chronic disease 2021    SILVER (obstructive sleep apnea) 2021    Dysphagia, pharyngeal phase 2020    Chronic diastolic heart failure ([address] 10/23/2018    Essential hypertension 2017     Allergies:    Allergies   Allergen Reactions    Haloperidol Lactate      Other reaction(s): Unknown (See Comments)  PT DOESN'T REMEMBER  Other reaction(s): Unknown (See Comments)  PT DOESN'T REMEMBER      Ziprasidone Hcl      Other reaction(s): Other (See Comments), Unknown (See Comments)  PT DOESN'T REMEMBER  PT DOESN'T REMEMBER  Other reaction(s): Unknown (See Comments)  PT DOESN'T REMEMBER       Onset Date: 02/17/2022    Subjective: Pt seen in room at bedside with RN permission    Pain: The patient does not complain of pain     Current Diet: ADULT DIET; Regular; 4 carb choices (60 gm/meal); Low Potassium (Less than 3000 mg/day); Low Phosphorus (Less than 1000 mg)    Diet Tolerance:  Patient tolerating current diet level without signs/symptoms of penetration / aspiration. Dysphagia Treatment and Impressions:   Pt seen in room at bedside with RN permission   Chart Review/Interview: pt was assessed at bedside on 02/17. At time of assessment, pt's cuff inflated, therefore, limited assessment with PO. SLP attempted to see the patient  02/18, however, cuff remained inflated. Pt noted with bloody tracheal secretions when PO diet was advanced via RN on 02/18 after SLP shift end. Dr. Josh Meier gave \"ok\" for repeat cuff deflation trial and PO diet per SLP on 02/19. Pt tolerated her baseline PO diet at time of eval. No concerns reported or noted in chart. Pt seen with cuff deflated. Has been tolerating since 02/19. Pt denies coughing or choking during PO intake however, states that she chokes after intake. SLP inquired more about this issue (I.e. does this happen with solids, frequency, etc.) however, pt unable to elaborate more.  Pt has 6 Shiley XLT Distal trach. Chronic trach/vent pt from [facility]. Normally consumes SBS and thin liquids. Has PEG but reports has not used it in [place] while\".  Respiratory Status: Vent settings as follows: Peak flow 50 LPM, PEEP 5, FiO2% 30, O2 100%   Liquid PO Trials:    IDDSI 0 Thin:  Assessed via straw: no anterior bolus loss , suspect functional A-P bolus transit, swallow timing subjectively appears timely. No coughing or choking noted.     Solid PO Trials   IDDSI 7 Regular:   no anterior bolus loss, suspect functional A-P bolus transit, swallow timing subjectively appears timely, grossly functional mastication, oral clearance WFL.  Education: SLP edu pt re: Role of SLP, Rationale for dysphagia tx, Recommended compensatory strategies, Aspiration precautions and POC. Pt verbalized understanding and RN aware of recommendations   Assessment: Pt grossly tolerating baseline diet per assessment. Would prefer instrumental assessment prior to PO advancement. Pt tolerating baseline diet at [facility] with no reported concerns. OK with advancement with strict precautions in place. See below for recommendations.  Recommendations: SLP recommends to advance to IDDSI 6 Soft and Bite Sized Solids; IDDSI 0 Thin Liquids; Meds whole with thin liquids  · Risk Management: upright for all intake, stay upright for at least 30 mins after intake, small bites/sips, close supervision with intake, oral care 2-3x/day to reduce adverse affects in the event of aspiration, slow rate of intake, general aspiration precautions and hold PO and contact SLP if s/s of aspiration or worsening respiratory status develop. Recommend strict aspiration precautions and close supervision via RN and/or RT with all intake. Dysphagia Goals:  Timeframe for Long-term Goals: 7 days (2/24/22)  Goal 1: Pt will demonstrate clinically safe swallow of soft solids and thin liquids  2/21/2022 Ongoing, progressing. Short-term Goals  Timeframe for Short-term Goals: 5 days (2/22/22)  Dysphagia Goals:   Goal 1. The patient will tolerate recommended diet without observed clinical signs of aspiration  2/21/2022 : Goal addressed, see above. Ongoing, progressing. Goal 2: The patient will recall and perform compensatory strategies, with no cues. 2/21/2022 : Goal addressed, see above. Ongoing, progressing.     Speech/Language/Cog Goals: N/A    Recommendations:  Solid Consistency: IDDSI 6 Soft and Bite Sized Solids  Liquid Consistency: IDDSI 0 Thin Liquids  Medication: Meds whole with thin liquids    Patient/Family/Caregiver Education: See above    Compensatory Strategies: See above    Plan:    Continued Dysphagia treatment with goals per plan of care. Discharge Recommendations: TBD    If pt discharges from hospital prior to Speech/Swallowing discharge, this note serves as tx and discharge summary.      Total Treatment Time / Charges     Time in Time out Total Time / units   Cognitive Tx         Speech Tx      Dysphagia Tx 1325 1340  15 mins / 1 unit     Signature:  [address] [facility]  Clinician     Co-Signing Supervisor:  Luis Loyola M.A., [address] [place] [address] Pathologist  Phone: [phone]

## 2022-02-21 NOTE — PROGRESS NOTES
RT Inhaler-Nebulizer Bronchodilator Protocol Note    There is a bronchodilator order in the chart from a provider indicating to follow the RT Bronchodilator Protocol and there is an Initiate RT Inhaler-Nebulizer Bronchodilator Protocol order as well (see protocol at bottom of note). CXR Findings:  No results found. The findings from the last RT Protocol Assessment were as follows:   History Pulmonary Disease: Smoker 15 pack years or more  Respiratory Pattern: Mild dyspnea at rest, irregular pattern, or RR 21-25 bpm  Breath Sounds: Slightly diminished and/or crackles  Cough: Strong, productive  Indication for Bronchodilator Therapy: On home bronchodilators  Bronchodilator Assessment Score: 8    Aerosolized bronchodilator medication orders have been revised according to the RT Inhaler-Nebulizer Bronchodilator Protocol below. Respiratory Therapist to perform RT Therapy Protocol Assessment initially then follow the protocol. Repeat RT Therapy Protocol Assessment PRN for score 0-3 or on second treatment, BID, and PRN for scores above 3. No Indications - adjust the frequency to every 6 hours PRN wheezing or bronchospasm, if no treatments needed after 48 hours then discontinue using Per Protocol order mode. If indication present, adjust the RT bronchodilator orders based on the Bronchodilator Assessment Score as indicated below. Use Inhaler orders unless patient has one or more of the following: on home nebulizer, not able to hold breath for 10 seconds, is not alert and oriented, cannot activate and use MDI correctly, or respiratory rate 25 breaths per minute or more, then use the equivalent nebulizer order(s) with same Frequency and PRN reasons based on the score. If a patient is on this medication at home then do not decrease Frequency below that used at home.     0-3 - enter or revise RT bronchodilator order(s) to equivalent RT Bronchodilator order with Frequency of every 4 hours PRN for wheezing or increased work of breathing using Per Protocol order mode. 4-6 - enter or revise RT Bronchodilator order(s) to two equivalent RT bronchodilator orders with one order with BID Frequency and one order with Frequency of every 4 hours PRN wheezing or increased work of breathing using Per Protocol order mode. 7-10 - enter or revise RT Bronchodilator order(s) to two equivalent RT bronchodilator orders with one order with TID Frequency and one order with Frequency of every 4 hours PRN wheezing or increased work of breathing using Per Protocol order mode. 11-13 - enter or revise RT Bronchodilator order(s) to one equivalent RT bronchodilator order with QID Frequency and an Albuterol order with Frequency of every 4 hours PRN wheezing or increased work of breathing using Per Protocol order mode. Greater than 13 - enter or revise RT Bronchodilator order(s) to one equivalent RT bronchodilator order with every 4 hours Frequency and an Albuterol order with Frequency of every 2 hours PRN wheezing or increased work of breathing using Per Protocol order mode.          Electronically signed by Shahrzad Abdullahi RCP on 2/20/2022 at 7:52 PM

## 2022-02-21 NOTE — PROGRESS NOTES
[facility]  Nephrology Follow up Note           Reason for Consult:  ESRD  Requesting Physician:  Dr. Jose M Hernandez history  resting  No new complaints. Has some skin itching but no rash.   HD on 2/19 with UF of 2l over 3 hours, post weight 136.3 kg  HD on 2/18 with 4.5 L UF over 3.5 hours, post weight 139.2 kg    ROS: No chest pain/shortness of breath/fever/nausea/vomiting  PSFH: No visitor    Scheduled Meds:   rectal mixture builder   Rectal BID    glycerin (ADULT)  1 suppository Rectal Daily    lidocaine   Topical BID    epoetin claire-epbx  16,000 Units IntraVENous Once per day on Tue Thu Sat    doxercalciferol  6 mcg IntraVENous Once per day on Tue Thu Sat    metoprolol succinate  12.5 mg Oral Daily    LORazepam  0.5 mg IntraVENous BID    cefepime  1,000 mg IntraVENous Q24H    heparin (porcine)  5,000 Units SubCUTAneous 3 times per day    insulin lispro  0-12 Units SubCUTAneous TID WC    insulin lispro  0-6 Units SubCUTAneous Nightly    warfarin placeholder: dosing by pharmacy   Other RX Placeholder    [Held by provider] ALPRAZolam  0.5 mg Oral BID    aspirin  81 mg Oral Daily    calcium acetate  1,334 mg Oral TID WC    chlorhexidine  15 mL Mouth/Throat BID    cyanocobalamin  500 mcg Oral Daily    docusate sodium  100 mg Oral BID    furosemide  20 mg Oral Once per day on Mon Wed Fri    hydrocortisone  25 mg Rectal BID    hydrocortisone   Topical BID    insulin glargine  15 Units SubCUTAneous Daily    midodrine  10 mg Oral Once per day on Mon Wed Fri    pantoprazole  40 mg Oral QAM AC    QUEtiapine  12.5 mg Oral Nightly    sennosides-docusate sodium  2 tablet Oral Nightly    sertraline  50 mg Oral Daily    sodium polystyrene  15 g Oral Once per day on Sun Mon Wed Fri    doxazosin  1 mg Oral Nightly    atorvastatin  80 mg Oral Nightly    ipratropium-albuterol  1 ampule Inhalation Q4H     Continuous Infusions:   dextrose       PRN Meds:.morphine, ondansetron **OR** ondansetron, polyethylene glycol, glucose, glucagon (rDNA), dextrose, dextrose bolus (hypoglycemia) **OR** dextrose bolus (hypoglycemia), diphenhydrAMINE, [Held by provider] HYDROcodone-acetaminophen, lactulose, midodrine, acetaminophen **OR** acetaminophen    History of Present Illness on 2/17/22:    80 y.o. yo female with PMH of ESRD on HD, chronic resp failure on trach/vent since 2/2021, HTN, COVID pneumonia, DVT on coumadin  who is admitted for loss of consciousness  Pt has been t/f to  [facility] from [place] [address] last year as she has been needing HD and is trach/vent  She usually cuts her HD session short and misses HD frequently. She says her hemorrhoids? give a lot of pain while laying on her bed during hD  Today she did dialysis for 1.5h and removed 300 ml only as she asked to come off HD. She became unresponsive when being t/f from HDU to the nursing home and had to bagged for few minutes before she regained consciousness. Apparently she had been on SBT for a week or so but currently is back on the vent. cxr shows chf and pneumonia    Physical exam:   Constitutional:  VITALS:  BP (!) 141/72   Pulse 89   Temp 97.2 °F (36.2 °C) (Oral)   Resp 18   Ht 5' 4\" (1.626 m)   Wt 291 lb 11.2 oz (132.3 kg)   SpO2 98%   BMI 50.07 kg/m²   Gen: alert, awake  Cardiovascular:  S1, S2 without m/r/g no lower extremity edema  Respiratory: decreased breath sounds ; trach in situ  Abdomen:  soft, nt, nd,   Neuro/Psy: AAoriented times 3 ; moves all 4 ext    Data/  Recent Labs     02/19/22  0430 02/20/22  0449 02/21/22  0528   WBC 8.0 6.5 6.1   HGB 9.0* 8.6* 8.9*   HCT 29.0* 27.7* 28.1*   MCV 97.0 95.8 96.1    285 289     Recent Labs     02/19/22  0430 02/20/22  0449 02/21/22  0528   * 136 134*   K 4.8 4.1 4.1   CL 97* 97* 97*   CO2 22 26 24   GLUCOSE 87 97 116*   BUN 49* 33* 41*   CREATININE 6.0* 4.7* 5.8*   LABGLOM 7* 9* 7*   GFRAA 8* 11* 9*   Echocardiogram  Summary   Definity contrast administered.    Left ventricular systolic function is mildly reduced with visually estimated   EF of 45%. Endocardium not entirely well visualized but no obvious segmental wall   motion abnormalities. Diastolic dysfunction grade and filling pressure are indeterminate. Mild concentric left ventricular hypertrophy. Unable to obtain SPAP due to lack of tricuspid regurgitation. Valves are not well visualized but there are no obvious structural   abnormalities or color flow abnormalities seen on doppler. 9- [facility]. 60%    Assessment  -ESRD on HD TTS at [facility]   Has h/o intra dialytic hypotension and is on midodrine    -Acute on chronic resp failure likely from pneumonia and fluid overload   Currently trach and vent    -Anemia    -leukocytosis/sepsis w HCAP   Respiratory culture growing Acinetobacter Baaumani and Proteus, Pseudomonas on cefepime    -CKD/MBD    -Rectal pain/hemorrhoids    Plan  -HD  per TTS schedule  -epo 16k w hd and hecterol 6 mcg w HD   -renal dose meds  -Discussed with ID, recs for cefepime another week 1.5/1.5/2. Will arrange at her dialysis unit and check on availability     Dyan Mehta MD  Office: [phone]  Fax:    [phone]  [facility]. [facility]

## 2022-02-21 NOTE — PROGRESS NOTES
Report given to Irais Perkins RN at patient bedside. Pt is stable, call light in reach, with no needs at this time. Care transferred.      Rosalia Rice RN

## 2022-02-21 NOTE — PROGRESS NOTES
02/21/22 1545   Vent Information   Vent Type 840   Vent Mode AC/VC   Vt Ordered 450 mL   Rate Set 14 bmp   Peak Flow 50 L/min   FiO2  30 %   SpO2 98 %   SpO2/FiO2 ratio 326.67   Sensitivity 3   PEEP/CPAP 5   Humidification Source Heated wire   Humidification Temp 37   Circuit Condensation Drained   Vent Patient Data   Peak Inspiratory Pressure 27 cmH2O   Mean Airway Pressure 13 cmH20   Rate Measured 20 br/min   Vt Exhaled 383 mL   Minute Volume 8 Liters   I:E Ratio 1:2.2   Spontaneous Breathing Trial (SBT) RT Doc   Pulse 90   Breath Sounds   Right Upper Lobe Diminished   Right Middle Lobe Diminished   Right Lower Lobe Diminished   Left Upper Lobe Diminished   Left Lower Lobe Diminished   Additional Respiratory  Assessments   Resp 14   Position Semi-Ríos's   Patient Observation   Observations Marizol #6 XLT Distal   Surgical Airway (trach) Marizol Cuffed   Placement Date/Time: 02/17/22 1634   Placed By:  In place on arrival to facility  Surgical Airway Type: Tracheostomy  Brand: Marizol  Style: Cuffed  Size (mm): 6  Comments: 6 Marizol XLT DISTAL   Status Secured

## 2022-02-21 NOTE — FLOWSHEET NOTE
02/21/22 0830   Vital Signs   Temp 97.2 °F (36.2 °C)   Temp Source Oral   Pulse 89   Heart Rate Source Monitor   Resp 18   BP (!) 141/72   BP Location Left upper arm   Patient Position Semi fowlers   Level of Consciousness Alert (0)   MEWS Score 1   Patient Currently in Pain Denies   Pain Assessment   Pain Assessment 0-10   Pain Level 0   Patient's Stated Pain Goal No pain   Oxygen Therapy   SpO2 98 %   Pulse Oximeter Device Mode Continuous   O2 Device Ventilator   FiO2  30 %     Pt assessment complete; see flow sheets. Vitals completed. Meds given per MAR. Pt stable at this time. Repositioned for comfort. Pt denies any other care needs at this time.     Bernice Ruano, RN

## 2022-02-21 NOTE — PROGRESS NOTES
Pharmacy Routine Monitoring of Warfarin (INR)  Active order for anticoagulants/antiplatelets: 903  Significant drug interactions: No obvious interactions  Goal INR: 2 - 3  Recent Labs     02/19/22  0430 02/20/22  0449 02/21/22  0528   INR 4.78* 3.58* 1.99*   HGB 9.0* 8.6* 8.9*     Date INR Dose  2/17     3.78     Hold  2/18     4.32     Hold  2/19     4.78     Hold  2/20     3.58     Hold  2/21     1.99     5mg       Assessment/Plan:       INR orders are placed. Give warfarin 5mg tonight .

## 2022-02-21 NOTE — CONSULTS
[facility] [place] Infectious Disease Consult Note      Francesca Nettles     : 1950    DATE OF VISIT:  2022  DATE OF ADMISSION:  2022       Subjective:     Francesca Nettles is a 80 y.o. female whom I've been asked to see by Dr. Gama Barnes for antibiotic mgmt of a probably VAP. Chief Complaint   Patient presents with    Loss of Consciousness     patient reported to have been on her way back from dialysis and went unresponsive. Patient is a trach vent patient and has been on \"spontaneous mode\" x1 week. Nurse states patient's vent said apnea and she started to desat, so she bagged her and brought her up to 99% and then patient woke up and looked at her. HPI:  Mrs. Stella Jones has a rather extensive medical history as noted below, and was brought to the ER several days ago after a period of unresponsiveness (and apparent respiratory arrest) following hemodialysis. It sounds like she improved relatively quickly with bag-mask ventilation with oxygen, but because of that sudden event, she was brought here, and then admitted. Initial eval didn't show a fever, but an impressive leukocytosis, mild bump in anion gap and procalcitonin, also borderline hypotension and then the hypoxemia when she came in (hypoxemia with respiratory acidosis when assessed on ABG here). Concerns were for both CHF and a possible pneumonia. Empiric Abx were given with cefepime and vancomycin, and she's made some good progress the last couple of days, in terms of overall clinical status, ease of breathing, vent support, lab trends. When only GNRs were recovered from a respiratory sample, her vanco was stopped and cefepime was continued. I was asked to comment on ongoing therapy in light of the sputum culture results, and also the difficulty with ongoing IV therapy when she might leave the hospital (given her attendance at HD three times weekly).     Ms. Stella Jones has a past medical history of Acute and chronic respiratory failure (acute-on-chronic) (HCC), Acute blood loss anemia, Acute deep vein thrombosis (DVT) of brachial vein of left upper extremity ([facility] [address] Anxiety disorder, Atherosclerotic heart disease of native coronary artery without angina pectoris, Bleeding hemorrhoids, Cerebellar infarct ([address] Cerebral infarction ([address] Chronic pain syndrome, Dependence on renal dialysis ([facility] [address] Dependence on respirator ([facility] [address] End stage renal disease ([address] Gastro-esophageal reflux disease with esophagitis, without bleeding, Insomnia, Major depressive disorder, recurrent ([facility] [address] Morbid obesity due to excess calories ([address] Muscle weakness, Obstructive sleep apnea (adult) (pediatric), Other hyperlipidemia, Other voice and resonance disorders, Personal history of COVID-19, Pulmonary hypertension ([facility] [address] Tracheostomy status ([facility] [address] Type 2 diabetes mellitus without complications ([facility] [address] and Unspecified atrial fibrillation ([facility] [address] She has no past surgical history on file. Her family history is not on file. Ms. Frank Villegas reports that she has quit smoking. She has never used smokeless tobacco. She reports that she does not drink alcohol and does not use drugs.     Current Facility-Administered Medications: NIFEdipine (PROCARDIA) 100 mg in lidocaine (XYLOCAINE) 5 % 12 g, mineral oil-hydrophilic petrolatum (AQUAPHOR) 18 g rectal mixture, , Rectal, BID  glycerin (ADULT) suppository 2 g, 1 suppository, Rectal, Daily  lidocaine (XYLOCAINE) 2 % uro-jet, , Topical, BID  epoetin claire-epbx (RETACRIT) injection 16,000 Units, 16,000 Units, IntraVENous, Once per day on Tue Thu Sat  doxercalciferol (HECTOROL) injection 6 mcg, 6 mcg, IntraVENous, Once per day on Tue Thu Sat  metoprolol succinate (TOPROL XL) extended release tablet 12.5 mg, 12.5 mg, Oral, Daily  LORazepam (ATIVAN) injection 0.5 mg, 0.5 mg, IntraVENous, BID  morphine (PF) injection 2 mg, 2 mg, IntraVENous, Q6H PRN  ondansetron (ZOFRAN-ODT) disintegrating tablet 4 mg, 4 mg, Oral, Q8H PRN **OR** ondansetron (ZOFRAN) injection 4 mg, 4 mg, IntraVENous, Q6H PRN  polyethylene glycol (GLYCOLAX) packet 17 g, 17 g, Oral, Daily PRN  cefepime (MAXIPIME) 1000 mg IVPB minibag, 1,000 mg, IntraVENous, Q24H  heparin (porcine) injection 5,000 Units, 5,000 Units, SubCUTAneous, 3 times per day  glucose (GLUTOSE) 40 % oral gel 15 g, 15 g, Oral, PRN  glucagon (rDNA) injection 1 mg, 1 mg, IntraMUSCular, PRN  dextrose 5 % solution, 100 mL/hr, IntraVENous, PRN  insulin lispro (HUMALOG) injection vial 0-12 Units, 0-12 Units, SubCUTAneous, TID WC  insulin lispro (HUMALOG) injection vial 0-6 Units, 0-6 Units, SubCUTAneous, Nightly  dextrose bolus (hypoglycemia) 10% 125 mL, 125 mL, IntraVENous, PRN **OR** dextrose bolus (hypoglycemia) 10% 250 mL, 250 mL, IntraVENous, PRN  warfarin placeholder: dosing by pharmacy, , Other, RX Placeholder  [Held by provider] ALPRAZolam (XANAX) tablet 0.5 mg, 0.5 mg, Oral, BID  aspirin EC tablet 81 mg, 81 mg, Oral, Daily  calcium acetate (PHOSLO) capsule 1,334 mg, 1,334 mg, Oral, TID WC  chlorhexidine (PERIDEX) 0.12 % solution 15 mL, 15 mL, Mouth/Throat, BID  vitamin B-12 (CYANOCOBALAMIN) tablet 500 mcg, 500 mcg, Oral, Daily  diphenhydrAMINE (BENADRYL) tablet 25 mg, 25 mg, Oral, Q8H PRN  docusate sodium (COLACE) capsule 100 mg, 100 mg, Oral, BID  furosemide (LASIX) tablet 20 mg, 20 mg, Oral, Once per day on Mon Wed Fri  [Held by provider] HYDROcodone-acetaminophen (NORCO) 5-325 MG per tablet 1 tablet, 1 tablet, Oral, Q12H PRN  hydrocortisone (ANUSOL-HC) suppository 25 mg, 25 mg, Rectal, BID  hydrocortisone 1 % cream, , Topical, BID  insulin glargine (LANTUS) injection vial 15 Units, 15 Units, SubCUTAneous, Daily  lactulose (CHRONULAC) 10 GM/15ML solution 20 g, 20 g, Oral, Daily PRN  midodrine (PROAMATINE) tablet 10 mg, 10 mg, Oral, Once per day on Mon Wed Fri  midodrine (PROAMATINE) tablet 10 mg, 10 mg, Oral, PRN  pantoprazole (PROTONIX) tablet 40 mg, 40 mg, Oral, QAM AC  QUEtiapine (SEROQUEL) tablet 12.5 mg, 12.5 mg, Oral, Nightly  sennosides-docusate sodium (SENOKOT-S) 8.6-50 MG tablet 2 tablet, 2 tablet, Oral, Nightly  sertraline (ZOLOFT) tablet 50 mg, 50 mg, Oral, Daily  sodium polystyrene (KAYEXALATE) 15 GM/60ML suspension 15 g, 15 g, Oral, Once per day on Sun Mon Wed Fri  doxazosin (CARDURA) tablet 1 mg, 1 mg, Oral, Nightly  atorvastatin (LIPITOR) tablet 80 mg, 80 mg, Oral, Nightly  acetaminophen (TYLENOL) tablet 650 mg, 650 mg, Oral, Q6H PRN **OR** acetaminophen (TYLENOL) suppository 650 mg, 650 mg, Rectal, Q6H PRN  ipratropium-albuterol (DUONEB) nebulizer solution 1 ampule, 1 ampule, Inhalation, Q4H     In terms of Abx, she received vancomycin on  and , and has been on cefepime since  as well. Allergies: Haloperidol lactate and Ziprasidone hcl    Pertinent items from the review of systems are discussed in the HPI; the remainder of the ROS was reviewed and is negative.      Objective:     Vital signs over the last 24 hours:  Temp  Av °F (37.2 °C)  Min: 98.5 °F (36.9 °C)  Max: 100 °F (37.8 °C)  Pulse  Av.7  Min: 89  Max: 93  Systolic (25PCF), DAY:159 , Min:129 , UIE:536   Diastolic (85TJX), GKA:82, Min:66, Max:71  Resp  Av  Min: 20  Max: 20  SpO2  Av.5 %  Min: 99 %  Max: 100 %    Constitutional:  well-developed, well-nourished, morbidly obese, NAD, conversant (despite being on the vent), does not look toxic  Psychiatric:  Oriented (as best I can tell) to person, place and time; mood and affect appropriate for the situation   Eyes:  pupils equal, round and reactive to light; sclerae anicteric, conjunctivae not pale  ENT:  atraumatic; oral mucosa moist, no thrush or ulcers  Resp:  Left lung a bit decreased at the base, right base with more coarse and bronchial breath sounds I think; no use of accessory muscles or other signs of resp distress; on the vent, via tracheostomy tube, 30% FIO2  Cardiovascular:  heart regular, decreased heart sounds, no gallop, no murmur; mild-mod lower extremity edema; no IV phlebitis  GI:  abdomen soft, NT, ND, normal bowel sounds, no palpable masses or organomegaly  Musculoskeletal:  no clubbing, cyanosis or petechiae; extremities with no gross effusions, joint misalignment or acute arthritis  Skin: warm, dry, normal turgor; no rash, no ulcers   ______________________________    Recent Labs     02/21/22  0528 02/20/22  0449 02/19/22  0430   WBC 6.1 6.5 8.0   HGB 8.9* 8.6* 9.0*   HCT 28.1* 27.7* 29.0*   MCV 96.1 95.8 97.0    285 244     Lab Results   Component Value Date    CREATININE 5.8 (HH) 02/21/2022     Lab Results   Component Value Date    LABALBU 3.1 (L) 02/18/2022     Lab Results   Component Value Date    ALT 8 (L) 02/17/2022    AST 12 (L) 02/17/2022    ALKPHOS 117 02/17/2022    BILITOT 0.4 02/17/2022      Lab Results   Component Value Date    LABA1C 5.4 02/17/2022     Other recent pertinent labs: Anion gap from 17 to 13. Initial lactate only 1.3. Initial procalcitonin only 0.96. Pro-BNP (just after HD) was 15k. Feb 18 vanco level still 18.5. ANC 13.2k, quickly to [facility] by two days later.  ______________________________    Recent pertinent micro results:  Admission BCx (-) x 2. Tracheal aspirate specimen with 2+ WBCs, 2+ GVR, 1+ GPC -- heavy growth of Proteus (R) amp, ancef, cipro, bactrim; heavy growth of Acinetobacter (S) to cefepime, gent, tobra, (I) to CTX; and light growth of Pseudomonas, pan-(S).   ______________________________    Recent imaging results (last 7 days):     XR CHEST PORTABLE    Result Date: 2/17/2022  CHF with pulmonary edema and or superimposed pneumonia.  [to me, I do think it looks like there's a distinct RLL infiltrate, separate from any CHF or edema.]     Assessment:     Patient Active Problem List   Diagnosis Code    Unresponsive episode R41.89    Syncope R55    Elevated troponin R77.8    Acute respiratory failure with hypoxia and hypercapnia (HCC) J96.01, J96.02  Pneumonia due to infectious organism J18.9    ESRD on dialysis ([facility] [address] N18.6, Z99.2    Chronic respiratory failure requiring continuous mechanical ventilation through tracheostomy ([facility] [address] J96.10, Z93.0, Z99.11    Acute on chronic respiratory failure with hypoxia (HCC) J96.21    Acute pulmonary edema (HCC) J81.0    Rectal pain K62.89    History of CVA (cerebrovascular accident) Z80.78    HCAP (healthcare-associated pneumonia) J18.9    Anemia, chronic disease D63.8    Anxiety F41.9    Chronic diastolic heart failure ([facility]) I50.32    Dysphagia, pharyngeal phase R13.13    Essential hypertension I10    GERD (gastroesophageal reflux disease) K21.9    Morbid obesity ([facility]) E66.01    SILVER (obstructive sleep apnea) G47.33     Assessment of today's active condition(s):     -- Background of morbid obesity, ESRD on HD, chronic respiratory failure, diastolic heart failure, Hx of CVA, HTN, SILVER.    -- Episode of unresponsiveness and apnea just after HD last week -- definitely not the usual presentation for a primary pneumonia, but she easily could have aspirated during that episode, did have some clinical and lab/imaging findings consistent with pneumonia or aspiration pneumonitis, and is colonized with a few different Gram-negative rods. Clinically improved pretty nicely those first few days, but that WAS with cefepime on board, to which all three of her organisms is (S). Treatment recs:     Continue cefepime for now. Vancomycin already stopped. Will get a repeat procalcitonin and CXR today. If the procalcitonin is lower and the RLL infiltrate has cleared, we might actually be able to stop Abx and just treat this as an aspiration pneumonitis. But if the procalcitonin is still up, or the infiltrate persists, should probably finish a full course of therapy for VCP. Can probably limit this to 7-10 days in total, since her clinical improvement seemed pretty prompt.      I've seen some data on just giving cefepime TIW at HD (for her, 1.5 gm Tues and Thurs, 2 gm on Saturday), but not sure if they can get this at HD next door at [facility]. I'll speak with Dr. Bay Snell. D/W Dr. Mouna Hernadez.      Electronically signed by Bola Sebastian MD on 2/21/2022 at 8:19 AM.

## 2022-02-21 NOTE — FLOWSHEET NOTE
02/21/22 0005   Vital Signs   Temp 98.6 °F (37 °C)   Temp Source Oral   Pulse 90   Heart Rate Source Monitor   Resp 20   /71   BP Location Left upper arm   Patient Position High fowlers   Level of Consciousness Alert (0)   MEWS Score 1   Patient Currently in Pain Denies   Oxygen Therapy   SpO2 99 %   Pulse Oximeter Device Mode Continuous   O2 Device Ventilator   Height and Weight   Weight 291 lb 11.2 oz (132.3 kg)   Weight Method Bed scale   BMI (Calculated) 50.2   Updated vannesa;s as shown. Patient cleaned up and repositioned with assistance x 3.

## 2022-02-21 NOTE — PROGRESS NOTES
Patient educated on discharge instructions as well as new medications use, dosage, administration and possible side effects. Patient verified knowledge. IV removed without difficulty and dry dressing in place. Telemetry monitor removed and returned to [facility]. Pt left facility in stable condition to Skilled nursing facility with all of their personal belongings. Report called to [facility] and nurse [address] Addressed all Pt and caregiver/nursing concerns at this time. Pt stable.     Dulce Chaudhari RN

## 2022-02-21 NOTE — PROGRESS NOTES
02/20/22 1949   Vent Information   Vent Type 840   Vent Mode AC/VC   Vt Ordered 355 mL   Rate Set 22 bmp   Peak Flow 50 L/min   FiO2  30 %   Sensitivity 3   PEEP/CPAP 5   Humidification Source Heated wire   Humidification Temp 37   Humidification Temp Measured 37   Circuit Condensation Drained   Vent Patient Data   Peak Inspiratory Pressure 19 cmH2O   Mean Airway Pressure 11 cmH20   Rate Measured 25 br/min   Vt Exhaled 575 mL   Minute Volume 9.23 Liters   I:E Ratio 1:2.5   Plateau Pressure 12 NFD91   Static Compliance 82 mL/cmH2O   Dynamic Compliance 41 mL/cmH2O   Cough/Sputum   Sputum Amount None   Breath Sounds   Right Upper Lobe Diminished   Right Middle Lobe Diminished   Right Lower Lobe Diminished   Left Upper Lobe Diminished   Left Lower Lobe Diminished   Additional Respiratory  Assessments   Position Semi-Ríos's   Alarm Settings   High Pressure Alarm 55 cmH2O   Low Minute Volume Alarm 2.5 L/min   Apnea (secs) 20 secs   High Respiratory Rate 45 br/min   Low Exhaled Vt  200 mL   Patient Observation   Observations Marizol #6 XLT Distal   Surgical Airway (trach) Marizol Cuffed   Placement Date/Time: 02/17/22 1634   Placed By:  In place on arrival to facility  Surgical Airway Type: Tracheostomy  Brand: Marizol  Style: Cuffed  Size (mm): 6  Comments: 6 Marizol XLT DISTAL   Status Secured   Site Assessment Clean;Dry

## 2022-02-21 NOTE — PROGRESS NOTES
Pulmonary Progress Note    CC: Mechanical ventilation    Subjective: Tolerating diet  Cough deflated and verbalizing some words  30% FiO2      IV line: Peripheral IVs      Intake/Output Summary (Last 24 hours) at 2/21/2022 0745  Last data filed at 2/20/2022 1254  Gross per 24 hour   Intake 222 ml   Output --   Net 222 ml       Exam:   /71   Pulse 90   Temp 98.6 °F (37 °C) (Oral)   Resp 20   Ht 5' 4\" (1.626 m)   Wt 291 lb 11.2 oz (132.3 kg)   SpO2 99%   BMI 50.07 kg/m²  on AC 30%  General: ill appearing. On mechanical ventilation  Eyes: PERRL. No sclera icterus. No conjunctival injection. ENT: No discharge. Pharynx clear. Tracheostomy tube in place  Neck: Trachea midline. Normal thyroid. Resp: No accessory muscle use. Minimal crackles. No wheezing. Few rhonchi. CV: Regular rate. Regular rhythm. No mumur or rub. No edema. GI: Non-tender. Non-distended. No masses. Skin: Warm and dry. No nodule on exposed extremities. No rash on exposed extremities. Lymph: No cervical LAD. No supraclavicular LAD. M/S: No cyanosis. No joint deformity. No clubbing. Neuro: On mechanical ventilation. Following commands. Alert and awake. Psych: No agitation or anxiety.          Scheduled Meds:   rectal mixture builder   Rectal BID    glycerin (ADULT)  1 suppository Rectal Daily    lidocaine   Topical BID    epoetin claire-epbx  16,000 Units IntraVENous Once per day on Tue Thu Sat    doxercalciferol  6 mcg IntraVENous Once per day on Tue Thu Sat    metoprolol succinate  12.5 mg Oral Daily    LORazepam  0.5 mg IntraVENous BID    cefepime  1,000 mg IntraVENous Q24H    heparin (porcine)  5,000 Units SubCUTAneous 3 times per day    insulin lispro  0-12 Units SubCUTAneous TID WC    insulin lispro  0-6 Units SubCUTAneous Nightly    warfarin placeholder: dosing by pharmacy   Other RX Placeholder    [Held by provider] ALPRAZolam  0.5 mg Oral BID    aspirin  81 mg Oral Daily    calcium acetate  1,334 mg Oral TID WC    chlorhexidine  15 mL Mouth/Throat BID    cyanocobalamin  500 mcg Oral Daily    docusate sodium  100 mg Oral BID    furosemide  20 mg Oral Once per day on Mon Wed Fri    hydrocortisone  25 mg Rectal BID    hydrocortisone   Topical BID    insulin glargine  15 Units SubCUTAneous Daily    midodrine  10 mg Oral Once per day on Mon Wed Fri    pantoprazole  40 mg Oral QAM AC    QUEtiapine  12.5 mg Oral Nightly    sennosides-docusate sodium  2 tablet Oral Nightly    sertraline  50 mg Oral Daily    sodium polystyrene  15 g Oral Once per day on Sun Mon Wed Fri    doxazosin  1 mg Oral Nightly    atorvastatin  80 mg Oral Nightly    ipratropium-albuterol  1 ampule Inhalation Q4H     Continuous Infusions:   dextrose       PRN Meds:  morphine, ondansetron **OR** ondansetron, polyethylene glycol, glucose, glucagon (rDNA), dextrose, dextrose bolus (hypoglycemia) **OR** dextrose bolus (hypoglycemia), diphenhydrAMINE, [Held by provider] HYDROcodone-acetaminophen, lactulose, midodrine, acetaminophen **OR** acetaminophen    Labs:  CBC:   Recent Labs     02/19/22  0430 02/20/22  0449 02/21/22  0528   WBC 8.0 6.5 6.1   HGB 9.0* 8.6* 8.9*   HCT 29.0* 27.7* 28.1*   MCV 97.0 95.8 96.1    285 289     BMP:   Recent Labs     02/18/22  0803 02/18/22  0803 02/19/22  0430 02/20/22  0449 02/21/22  0528   *   < > 135* 136 134*   K 4.8  --  4.8 4.1 4.1   CL 93*   < > 97* 97* 97*   CO2 22   < > 22 26 24   PHOS 5.6*  --   --   --   --    BUN 79*   < > 49* 33* 41*   CREATININE 7.8*   < > 6.0* 4.7* 5.8*    < > = values in this interval not displayed. LIVER PROFILE:   No results for input(s): AST, ALT, LIPASE, BILIDIR, BILITOT, ALKPHOS in the last 72 hours. Invalid input(s): AMYLASE,  ALB  PT/INR:   Recent Labs     02/19/22  0430 02/20/22  0449 02/21/22  0528   PROTIME 57.6* 42.6* 23.1*   INR 4.78* 3.58* 1.99*     APTT: No results for input(s): APTT in the last 72 hours.   UA:No results for input(s): NITRITE, COLORU, PHUR, LABCAST, WBCUA, RBCUA, MUCUS, TRICHOMONAS, YEAST, BACTERIA, CLARITYU, SPECGRAV, LEUKOCYTESUR, UROBILINOGEN, BILIRUBINUR, BLOODU, GLUCOSEU, AMORPHOUS in the last 72 hours. Invalid input(s): KETONESU  No results for input(s): PHART, FJA6CDL, PO2ART in the last 72 hours. Cultures:   2/17 BC NGTD  2/17 respiratory Proteus and Acinetobacter and Pseudomonas  2/17 COVID-19 not detected    Films:  Chest x-ray 2/21 imaging was reviewed by me and showed   Tracheostomy tube in place  Pulmonary edema  Right lower lobe airspace disease-somewhat improving       Echocardiogram 2/18  Definity contrast administered. Left ventricular systolic function is mildly reduced with visually estimated   EF of 45%. Endocardium not entirely well visualized but no obvious segmental wall   motion abnormalities. Diastolic dysfunction grade and filling pressure are indeterminate. Mild concentric left ventricular hypertrophy. Unable to obtain SPAP due to lack of tricuspid regurgitation. Valves are not well visualized but there are no obvious structural   abnormalities or color flow abnormalities seen on doppler.           ASSESSMENT:  · Acute hypoxemic hypercapnic respiratory failure   · RLL HCAP -Proteus/Acinetobacter/Pseudomonas  · Abnormal chest x-ray-CHF and probable pneumonia  · Chronic respiratory failure requiring continuous mechanical ventilation through tracheostomy  · Systolic CHF-EF 31%  · ESRD on HD via right arm fistula  · CAD  · History of COVID-19 October 2020  · History of SILVER pulmonary hypertension     PLAN:  · Mechanical ventilation as per my orders- AC14/450/+5 30% FiO2   · Head of bed 30 degrees or higher at all times  · Cefepime day # 5-ID has been consulted  · Follow-up culture  · Inhaled bronchodilators  · Speech pathology  · HD per nephrology  · Cardiology following  · Coumadin pharmacy to dose

## 2022-02-21 NOTE — CARE COORDINATION
DISCHARGE ORDER  Date/Time 2022 1:04 PM  Completed by: Marzena Diehl, OSMIN, Case Management    Patient Name: Chasity Mckeon    : 1950      Admit order Date and Status:2022  Noted discharge order. (verify MD's last order for status of admission/Traditional Medicare 3 [place] Inpatient qualifying stay required for SNF)    Confirmed discharge plan with:              Patient:  No              When pt confirms DC plan does any support person need to be contacted by CM Yes if yes who___dtr Janerin___                      Discharge to Facility:  [facility] phone number for staff giving report: [phone]   Pre-certification completed: LTC not needed   Hospital Exemption Notification (HENS) completed: LTC not needed   Discharge orders and Continuity of Care faxed to facility:  yes      Transportation:               Medical Transport explained with choice list offered to pt/family. Choice:Yes(no preference)  Agency used: [facility] up time:   1600      Pt/family/Nursing/Facility aware of  time:   Yes Names: dtr Keisha/bedside RN Jolanta/Noelle @ [facility]  Ambulance form completed:  yes:      Comments:Chart reviewed. dtr aware of  time for transport back to [facility]. Simeon Risk @ [facility] aware and can accept pt today with negative covid test. covid test ordered at this time and bedside RN Lidya Pham aware. Dr Luiz Ayala set up iv Cefepime with pt's HD at [place] and he states pt is cleared to return to [facility] at this time. Pt is being d/c'd to [facility] today. Pt's O2 sats are 99% on trach/vent. Discharge timeout done with Lidya Pham. All discharge needs and concerns addressed. Discharging nurse to complete AMADEO, reconcile AVS, and place final copy with patient's discharge packet.  Discharging RN to ensure that written prescriptions for  Level II medications are sent with patient to the facility as per protocol.

## 2022-02-21 NOTE — PLAN OF CARE
Problem: Falls - Risk of:  Goal: Will remain free from falls  Description: Will remain free from falls  Outcome: Met This Shift  Goal: Absence of physical injury  Description: Absence of physical injury  Outcome: Met This Shift     Problem: Nutrition  Goal: Optimal nutrition therapy  Outcome: Met This Shift  Goal: Understanding of nutritional guidelines  Outcome: Met This Shift     Problem: Pain:  Goal: Pain level will decrease  Description: Pain level will decrease  Outcome: Met This Shift  Goal: Control of acute pain  Description: Control of acute pain  Outcome: Met This Shift  Goal: Control of chronic pain  Description: Control of chronic pain  Outcome: Met This Shift

## 2022-02-21 NOTE — DISCHARGE INSTR - COC
Continuity of Care Form    Patient Name: Mayelin Marion   :    MRN:  [id number]    [address] [place] [address] date:  2022  Discharge date:  2022    Code Status Order: Full Code   Advance Directives:      Admitting Physician:  Jae Leach MD  PCP: Lupe Olmos MD    Discharging Nurse: Loren Schaumann, [facility] Unit/Room#: /7062-76  Discharging Unit Phone Number: [phone]      Emergency Contact:   Extended Emergency Contact Information  Primary Emergency Contact: [address] Phone: [phone]  Relation: Child    Past Surgical History:  History reviewed. No pertinent surgical history. Immunization History: There is no immunization history on file for this patient.     Active Problems:  Patient Active Problem List   Diagnosis Code    Unresponsive episode R41.89    Syncope R55    Elevated troponin R77.8    Acute respiratory failure with hypoxia and hypercapnia (HCC) J96.01, J96.02    Pneumonia due to infectious organism J18.9    ESRD on dialysis ([facility] N18.6, Z99.2    Chronic respiratory failure requiring continuous mechanical ventilation through tracheostomy ([facility] J96.10, Z93.0, Z99.11    Acute on chronic respiratory failure with hypoxia (HCC) J96.21    Acute pulmonary edema (HCC) J81.0    Rectal pain K62.89    History of CVA (cerebrovascular accident) Z86.73    HCAP (healthcare-associated pneumonia) J18.9    Anemia, chronic disease D63.8    Anxiety F41.9    Chronic diastolic heart failure (HCC) I50.32    Dysphagia, pharyngeal phase R13.13    Essential hypertension I10    GERD (gastroesophageal reflux disease) K21.9    Morbid obesity ([facility] E66.01    SILVER (obstructive sleep apnea) G47.33       Isolation/Infection:   Isolation            Contact          Patient Infection Status       Infection Onset Added Last Indicated Last Indicated By Review Planned Expiration Resolved Resolved By    MDRO (multi-drug resistant organism) 22 Culture, Respiratory        MRSA 21 Jami Cortes RN        Added from external infection. Resolved    C-diff Rule Out 22 Clostridium difficile toxin/antigen (Ordered)   22 Jami Cortes RN    Order . COVID-19 (Rule Out) 22 COVID-19 & Influenza Combo (Ordered)   22 Rule-Out Test Resulted    COVID-19 (Rule Out) 01/10/22 01/10/22 01/11/22 COVID-19 & Influenza Combo (Ordered)   22 Rule-Out Test Resulted            Nurse Assessment:  Last Vital Signs: BP (!) 141/72   Pulse 89   Temp 97.2 °F (36.2 °C) (Oral)   Resp 18   Ht 5' 4\" (1.626 m)   Wt 291 lb 11.2 oz (132.3 kg)   SpO2 99%   BMI 50.07 kg/m²     Last documented pain score (0-10 scale): Pain Level: 0  Last Weight:   Wt Readings from Last 1 Encounters:   22 291 lb 11.2 oz (132.3 kg)     Mental Status:  oriented and alert    IV Access:  - Dialysis Catheter  - site  right and subclavian, insertion date: ***    Nursing Mobility/ADLs:  Walking   Dependent  Transfer  Dependent  Bathing  Dependent  Dressing  Dependent  Toileting  Dependent  Feeding  Independent  Med Admin  Assisted  Med Delivery   whole    Wound Care Documentation and Therapy:        Elimination:  Continence: Bowel: No  Bladder: No  Urinary Catheter: None   Colostomy/Ileostomy/Ileal Conduit: No       Date of Last BM: 2022      Intake/Output Summary (Last 24 hours) at 2022 1157  Last data filed at 2022 0840  Gross per 24 hour   Intake 342 ml   Output --   Net 342 ml     I/O last 3 completed shifts: In: 5 [P.O.:222]  Out: -     Safety Concerns:     Aspiration Risk    Impairments/Disabilities:      None    Nutrition Therapy:  Current Nutrition Therapy:   - Oral Diet:  Carb Control 4 carbs/meal (1800kcals/day)    Routes of Feeding: Oral  Liquids:  Thin Liquids  Daily Fluid Restriction: no  Last Modified Barium Swallow with Video (Video Swallowing Test): not done    Treatments at the Time of Hospital Discharge:   Respiratory Treatments: Chronic Trach  Oxygen Therapy:   Chronic Trach  Ventilator:    - Ventilator Settings:    Vt Ordered: 450 mL  Rate Set: 14 bmp  FiO2 : 30 %    PEEP/CPAP: 5       Rehab Therapies: Physical Therapy and Occupational Therapy  Weight Bearing Status/Restrictions: No weight bearing restirctions  Other Medical Equipment (for information only, NOT a DME order):  hospital bed  Other Treatments:     Patient's personal belongings (please select all that are sent with patient):  None    RN SIGNATURE:  Electronically signed by Chao Chavarria RN on 2/21/22 at 3:55 PM EST    CASE MANAGEMENT/SOCIAL WORK SECTION    Inpatient Status Date: 2/17/2022    Readmission Risk Assessment Score:  Readmission Risk              Risk of Unplanned Readmission:  36           Discharging to Facility/ Agency     Name: [facility])  Address: [address] , [place], [place] [address]  DDA:[phone] or [phone]     Dialysis Facility (if applicable)   Name:[facility]   Address:  Dialysis Schedule:TTS  Phone:[phone]  Fax:[phone]    / signature: Electronically signed by Gabby Vincent RN on 2/21/22 at 12:15 PM EST    PHYSICIAN SECTION    Prognosis: Fair    Condition at Discharge: Stable    Rehab Potential (if transferring to Rehab): Guarded    Recommended Labs or Other Treatments After Discharge: Iv Abx per ID - to be arranged with HD. Physician Certification: I certify the above information and transfer of Marylene Hoit  is necessary for the continuing treatment of the diagnosis listed and that she requires [facility] for greater 30 days.      Update Admission H&P: No change in H&P    PHYSICIAN SIGNATURE:  Electronically signed by Agus Ludwig MD on 2/21/22 at 11:57 AM EST

## 2022-02-21 NOTE — PROGRESS NOTES
Vent settings changed per Dr. Esperanza Foster notes on 2/20. Vent settings are now ACVC 14/450/30%/+5. Statement Selected

## 2022-02-21 NOTE — DISCHARGE SUMMARY
Hospital Medicine Discharge Summary    Patient ID: Giuliano Calderon      Patient's PCP: Rosalie Villalobos MD    Admit Date: 2/17/2022     Discharge Date:  2/21/2022    Admitting Provider: Romeo Frankel, MD     Discharge Provider: Lotus Marcum MD     Discharge Diagnoses: Active Hospital Problems    Diagnosis     HCAP (healthcare-associated pneumonia) [J18.9]     Pneumonia due to infectious organism [J18.9]     Acute on chronic respiratory failure with hypoxia ([address] [J96.21]     Acute pulmonary edema ([address] [J81.0]     Rectal pain [K62.89]     History of CVA (cerebrovascular accident) [Z86.73]        The patient was seen and examined on day of discharge and this discharge summary is in conjunction with any daily progress note from day of discharge. Hospital Course: 68yo woman morbid obesity, chronic trach, ESRD on HD,   From long term care with unresponsive episode. #Unresponsiveness and ? Syncope  -Acute on chronic resp failure vs fluid overload   - now awake, alert and conversant. - unsure what happened at her ECF. - Mentation has been back to baseline now.          #ESRD on HD   -Has missed last 3 dialysis sessions as reported by [facility] RN due to refusal  -Fluid overloaded, Pulmonary edema on CXR  -Pro-BNP elevated >15,000, previous was 7940  -Nephrology consulted; had  HD and ongoing aggressive fluid removal   -Requires midodrine for HD         #Acute on chronic respiratory failure with chronic trach vent  #Pulmonary HTN  Suspected VAP  -Reportedly desaturated during [facility] transition at [facility] with unresponsiveness - likely with splinting of chest   -Bagged to 99% and patient had return of conciousness  -Pulm consulted; vent settings per pulm  -Pulm edema vs superimposed pna on CXR  -PCT 0.96  -Vacno and cefepime D#5              - cultures with acinetobacter, MDRO pseudomonas and proteus. - due to MDRO VAP I asked ID to eval for Abx recommendations.     - it appears we may be able to use cefepime IV with HD to complete treatment - being set up by ID and nephrology.           #Elevated troponin  -0.20--0.39--0.37  -No CP  -EKG with no ischemia  -Likely 2/2 ESRD  - cardiology consulted          #Chronic dCHF no ae  #New onset cardiomyopathy   -BNP elevated  -Fluid overloaded but likely 2/2 need for dialysis  -EKG with no ischemia  -Cardiology consult;   - echo showed decrease in EF 45%  - recommend medical management. No ACE/ARB due to ESRD add low dose toprol as blood pressure allows          #Rectal pain; hemorrhoids   -Chronic, x3 years per daugther  -Has been worked up for this as OP  -Continue pain control and bowel regimen  -General Surgery Consulted as patient is unable to tolerate dialysis at times due to rectal pain. - no visible external source of pain. - possibly rectal tear/stercoral colitis/proctitis. - cont bowel regimen and stool softeners. - urojet and proctofoam for symptom control.           #CAD  -Continue ASA, statin          #Hx of CVA  -Pharm to dose warfarin           #HLD  -On statin          #DMII  Resume PTA regimen          #GERD  -Continue PPI          #SILVER  -On chronic trach vent          #Mood disorder  -Continue psych meds          #Morbid Obesity  -Complicating assessment and treatment. Placing patient at risk for multiple co-morbidities as well as early death and contributing to the patient's presentation.   - Recommend weight loss. Pt is awake, pleasant and conversant able to speak when trach cuff is deflated. Feels a lot better.   + BM. No rectal pain today. Physical Exam Performed:     BP (!) 141/72   Pulse 89   Temp 97.2 °F (36.2 °C) (Oral)   Resp 18   Ht 5' 4\" (1.626 m)   Wt 291 lb 11.2 oz (132.3 kg)   SpO2 99%   BMI 50.07 kg/m²          Gen:  obese AA female on vent, fully awake, alert and oriented . Nonverbal. Morbidly obese.   Eyes: PERRL.  No sclera icterus. No conjunctival injection. ENT: No discharge. Trachea midline. Tracheostomy vent in place.   Resp: No accessory muscle use. No crackles. No wheezes. Diminished in bases  CV: Regular rate. Regular rhythm. No murmur.  No rub. No edema. Capillary Refill: Brisk,< 3 seconds   Peripheral Pulses: +2 palpable, equal bilaterally   GI: morbidly obese, Non-tender. Non-distended. No masses. No organomegaly. Normal bowel sounds. No hernia. Skin: Warm and dry. No nodule on exposed extremities. No rash on exposed extremities. M/S: atrophic lower ext ,  No joint deformity. No clubbing. Neuro: Awake oriented . Grossly nonfocal    Pt is non ambulatory       Labs: For convenience and continuity at follow-up the following most recent labs are provided:      CBC:    Lab Results   Component Value Date    WBC 6.1 02/21/2022    HGB 8.9 02/21/2022    HCT 28.1 02/21/2022     02/21/2022       Renal:    Lab Results   Component Value Date     02/21/2022    K 4.1 02/21/2022    CL 97 02/21/2022    CO2 24 02/21/2022    BUN 41 02/21/2022    CREATININE 5.8 02/21/2022    CALCIUM 9.1 02/21/2022    PHOS 5.6 02/18/2022         Significant Diagnostic Studies    Radiology:   XR CHEST PORTABLE   Preliminary Result   Slight improvement seen in the region of the lung fields. Tracheostomy tube   remains in satisfactory position above the brian. Degenerative changes seen   within the spine with no pleural effusion or pneumothorax. XR CHEST PORTABLE   Final Result   CHF with pulmonary edema and or superimposed pneumonia.                 Consults:     IP CONSULT TO HOSPITALIST  IP CONSULT TO PULMONOLOGY  IP CONSULT TO NEPHROLOGY  IP CONSULT TO CARDIOLOGY  IP CONSULT TO DIETITIAN  IP CONSULT TO CASE MANAGEMENT  PHARMACY TO DOSE WARFARIN  PHARMACY TO DOSE WARFARIN  IP CONSULT TO GENERAL SURGERY  IP CONSULT TO GI  IP CONSULT TO PHARMACY  IP CONSULT TO INFECTIOUS DISEASES    Disposition:  ECF with IV Abx to be arranged with HD     Condition at Discharge: Stable    Discharge Instructions/Follow-up:  PCP 1 week. Code Status:  Full Code     Activity: activity as tolerated    Diet: diabetic diet      Discharge Medications:     Current Discharge Medication List           Details   metoprolol succinate (TOPROL XL) 25 MG extended release tablet Take 0.5 tablets by mouth daily  Qty: 30 tablet, Refills: 3              Details   HYDROcodone-acetaminophen (NORCO) 5-325 MG per tablet Take 1 tablet by mouth every 12 hours as needed for Pain for up to 3 days. Qty: 6 tablet, Refills: 0    Comments: Reduce doses taken as pain becomes manageable  Associated Diagnoses: Rectal pain      ALPRAZolam (XANAX) 0.5 MG tablet Take 1 tablet by mouth in the morning and at bedtime for 5 days. Qty: 10 tablet, Refills: 0    Associated Diagnoses: Anxiety              Details   warfarin (COUMADIN) 6 MG tablet Take 6 mg by mouth nightly      !! midodrine (PROAMATINE) 5 MG tablet Take 10 mg by mouth three times a week Tues Thurs Sat- mid dialysis treatment      cyanocobalamin 1000 MCG tablet Take 500 mcg by mouth daily      QUEtiapine (SEROQUEL) 25 MG tablet Take 12.5 mg by mouth nightly      sodium polystyrene (KAYEXALATE) 15 GM/60ML suspension Take 15 g by mouth 4 times daily Sun, Mon, Wed, Fri      calcium acetate 667 MG TABS Take 1,334 mg by mouth 3 times daily (with meals)      hydrocortisone (PREPARATION H) 1 % cream Apply topically 2 times daily Apply topically 2 times daily.       acetaminophen (TYLENOL) 325 MG tablet Take 650 mg by mouth every 6 hours as needed for Pain      atorvastatin (LIPITOR) 80 MG tablet Take 80 mg by mouth at bedtime      insulin glargine (LANTUS) 100 UNIT/ML injection vial Inject 15 Units into the skin daily      aspirin 81 MG EC tablet Take 81 mg by mouth daily      diphenhydrAMINE (BENADRYL) 25 MG capsule Take 25 mg by mouth every 8 hours as needed       docusate sodium (COLACE) 100 MG capsule Take 100 mg by mouth 2 times daily lactulose encephalopathy 10 GM/15ML SOLN solution Take 20 g by mouth daily as needed       polyethylene glycol (GLYCOLAX) 17 g packet Take 17 g by mouth daily as needed for Constipation      insulin NPH (HUMULIN N;NOVOLIN N) 100 UNIT/ML injection vial Inject into the skin every 6 hours       hydrocortisone (ANUSOL-HC) 25 MG suppository Place 25 mg rectally 2 times daily      ipratropium-albuterol (DUONEB) 0.5-2.5 (3) MG/3ML SOLN nebulizer solution Inhale 1 vial into the lungs every 4 hours      furosemide (LASIX) 20 MG tablet Take 20 mg by mouth three times a week MWF      loperamide (IMODIUM) 2 MG capsule Take 2 mg by mouth 4 times daily as needed for Diarrhea      !! midodrine (PROAMATINE) 5 MG tablet Take 10 mg by mouth three times a week Tues thurs sat pre dialysis      dextromethorphan-guaiFENesin (MUCINEX DM)  MG per extended release tablet Take 1 tablet by mouth 2 times daily       omeprazole (PRILOSEC) 20 MG delayed release capsule Take 20 mg by mouth daily      chlorhexidine (PERIDEX) 0.12 % solution Take 15 mLs by mouth 2 times daily      sennosides-docusate sodium (SENOKOT-S) 8.6-50 MG tablet Take 2 tablets by mouth at bedtime       sertraline (ZOLOFT) 50 MG tablet Take 50 mg by mouth daily      terazosin (HYTRIN) 1 MG capsule Take 1 mg by mouth nightly      ondansetron (ZOFRAN) 4 MG tablet Take 4 mg by mouth every 8 hours as needed for Nausea or Vomiting       !! - Potential duplicate medications found. Please discuss with provider. Time Spent on discharge is more than 30 minutes in the examination, evaluation, counseling and review of medications and discharge plan. Signed:    Ladonna Carranza MD   2/21/2022      Thank you Nona Chamberlain MD for the opportunity to be involved in this patient's care. If you have any questions or concerns please feel free to contact me at [phone].

## 2022-02-21 NOTE — FLOWSHEET NOTE
02/20/22 2030   Vital Signs   Temp 98.5 °F (36.9 °C)   Temp Source Oral   Pulse 93   Heart Rate Source Monitor   Resp 20   /66   BP Location Left upper arm   Patient Position High fowlers   Level of Consciousness Alert (0)   MEWS Score 1   Patient Currently in Pain Denies   Pain Assessment   Pain Assessment 0-10   Oxygen Therapy   SpO2 99 %   Pulse Oximeter Device Mode Continuous   O2 Device Ventilator   Vitals as shown above, shift assessment completed. Suctioned patient per request, moderate amounts thick sputum returned. Meds given PO with water. Patient denies further needs at this time.     Rosalia Rice RN

## 2022-02-21 NOTE — PROGRESS NOTES
Lab called critical creatinine of 5.8, Dr Scooby Wright notified via perfect serve.      Kristin Workman RN

## 2022-02-21 NOTE — PROGRESS NOTES
02/20/22 2313   Vent Information   Vent Type 840   Vent Mode AC/VC   Vt Ordered 355 mL   Rate Set 22 bmp   Peak Flow 50 L/min   FiO2  30 %   Sensitivity 3   PEEP/CPAP 5   Vent Patient Data   Peak Inspiratory Pressure 20 cmH2O   Mean Airway Pressure 11 cmH20   Rate Measured 23 br/min   Vt Exhaled 357 mL   Minute Volume 7.79 Liters   I:E Ratio 1:2.5   Cough/Sputum   Sputum How Obtained Suctioned;Tracheal   Sputum Amount Moderate   Sputum Color Creamy; Red   Tenacity Thick   Breath Sounds   Right Upper Lobe Diminished   Right Middle Lobe Diminished   Right Lower Lobe Diminished   Left Upper Lobe Diminished   Left Lower Lobe Diminished   Additional Respiratory  Assessments   Position Semi-Ríos's

## 2022-02-23 LAB
CULTURE, RESPIRATORY: ABNORMAL
GRAM STAIN RESULT: ABNORMAL
Lab: NORMAL
ORGANISM: ABNORMAL
REPORT: NORMAL
THIS TEST SENT TO: NORMAL

## 2022-02-25 NOTE — PROGRESS NOTES
Physician Progress Note      PATIENT:               Emily Sanchez  CSN #:                  [id number]  :                       1950  ADMIT DATE:       2022 7:12 AM  DISCH DATE:        2022 5:21 PM  RESPONDING  PROVIDER #:        Carrie Patel MD          QUERY TEXT:    Pt admitted with fluid overload and pneumonia. Noted documentation of Sepsis   per nephrology consultant progress note dated . Patient with   leukocytosis-15.1, procalcitonin-0.96, and tachycardia. If possible, please   document in progress notes and discharge summary:    The medical record reflects the following:  Risk Factors: advanced age, chronic trach/vent/ from ECF, ESRD, DM, CHF  Clinical Indicators: per nephrology note: leukocytosis/sepsis, wbc-15.1,   pct-0.96, tachycardia, HCAP  Treatment: lactic, pct, blood cultures, Cefipime, Vancomycin, tele    Thank you for your assistance,  Barbra Ludwig RN,BSN,CCDS,CRCR  Options provided:  -- Sepsis confirmed present on admission  -- Sepsis confirmed not present on admission  -- Sepsis ruled out  -- Other - I will add my own diagnosis  -- Disagree - Not applicable / Not valid  -- Disagree - Clinically unable to determine / Unknown  -- Refer to Clinical Documentation Reviewer    PROVIDER RESPONSE TEXT:    The diagnosis of sepsis was confirmed as present on admission.     Query created by: Lashay Hull on 2022 6:48 AM      Electronically signed by:  Carrie Patel MD 2022 2:26 PM

## 2022-03-03 ENCOUNTER — APPOINTMENT (OUTPATIENT)
Dept: CT IMAGING | Age: 72
End: 2022-03-03
Payer: COMMERCIAL

## 2022-03-03 ENCOUNTER — TELEPHONE (OUTPATIENT)
Dept: OTHER | Facility: CLINIC | Age: 72
End: 2022-03-03

## 2022-03-03 ENCOUNTER — HOSPITAL ENCOUNTER (EMERGENCY)
Age: 72
Discharge: OTHER INSTITUTION | End: 2022-03-04
Attending: STUDENT IN AN ORGANIZED HEALTH CARE EDUCATION/TRAINING PROGRAM
Payer: COMMERCIAL

## 2022-03-03 DIAGNOSIS — B96.89 BV (BACTERIAL VAGINOSIS): Primary | ICD-10-CM

## 2022-03-03 DIAGNOSIS — H10.9 CONJUNCTIVITIS OF BOTH EYES, UNSPECIFIED CONJUNCTIVITIS TYPE: ICD-10-CM

## 2022-03-03 DIAGNOSIS — K59.00 CONSTIPATION, UNSPECIFIED CONSTIPATION TYPE: ICD-10-CM

## 2022-03-03 DIAGNOSIS — N76.0 BV (BACTERIAL VAGINOSIS): Primary | ICD-10-CM

## 2022-03-03 LAB
BACTERIA WET PREP: ABNORMAL
CLUE CELLS: ABNORMAL
EPITHELIAL CELLS WET PREP: ABNORMAL
GLUCOSE BLD-MCNC: 114 MG/DL (ref 70–99)
PERFORMED ON: ABNORMAL
RBC WET PREP: ABNORMAL
SOURCE WET PREP: ABNORMAL
TRICHOMONAS PREP: ABNORMAL
WBC WET PREP: ABNORMAL
YEAST WET PREP: ABNORMAL

## 2022-03-03 PROCEDURE — 87210 SMEAR WET MOUNT SALINE/INK: CPT

## 2022-03-03 PROCEDURE — 99285 EMERGENCY DEPT VISIT HI MDM: CPT

## 2022-03-03 PROCEDURE — 6370000000 HC RX 637 (ALT 250 FOR IP): Performed by: STUDENT IN AN ORGANIZED HEALTH CARE EDUCATION/TRAINING PROGRAM

## 2022-03-03 PROCEDURE — 72192 CT PELVIS W/O DYE: CPT

## 2022-03-03 RX ORDER — HYDROCORTISONE 25 MG/G
CREAM TOPICAL 2 TIMES DAILY
Qty: 1 EACH | Refills: 0 | Status: SHIPPED | OUTPATIENT
Start: 2022-03-03

## 2022-03-03 RX ORDER — ERYTHROMYCIN 5 MG/G
OINTMENT OPHTHALMIC EVERY 6 HOURS
Qty: 1 EACH | Refills: 0 | Status: SHIPPED | OUTPATIENT
Start: 2022-03-03 | End: 2022-03-10

## 2022-03-03 RX ORDER — METRONIDAZOLE 500 MG/1
500 TABLET ORAL 3 TIMES DAILY
Qty: 21 TABLET | Refills: 0 | Status: SHIPPED | OUTPATIENT
Start: 2022-03-03 | End: 2022-03-10

## 2022-03-03 RX ORDER — HYDROCORTISONE 25 MG/G
CREAM TOPICAL 2 TIMES DAILY
Status: DISCONTINUED | OUTPATIENT
Start: 2022-03-03 | End: 2022-03-04 | Stop reason: HOSPADM

## 2022-03-03 RX ORDER — METRONIDAZOLE 250 MG/1
500 TABLET ORAL ONCE
Status: COMPLETED | OUTPATIENT
Start: 2022-03-03 | End: 2022-03-03

## 2022-03-03 RX ORDER — ERYTHROMYCIN 5 MG/G
OINTMENT OPHTHALMIC ONCE
Status: COMPLETED | OUTPATIENT
Start: 2022-03-03 | End: 2022-03-03

## 2022-03-03 RX ADMIN — ERYTHROMYCIN: 5 OINTMENT OPHTHALMIC at 17:04

## 2022-03-03 RX ADMIN — METRONIDAZOLE 500 MG: 250 TABLET ORAL at 17:04

## 2022-03-03 ASSESSMENT — ENCOUNTER SYMPTOMS
EYE DISCHARGE: 1
CONSTIPATION: 1
VOMITING: 0
ABDOMINAL PAIN: 0
SHORTNESS OF BREATH: 0
BACK PAIN: 0
COUGH: 0
EYE ITCHING: 1
NAUSEA: 0
RECTAL PAIN: 1

## 2022-03-03 ASSESSMENT — PAIN DESCRIPTION - DESCRIPTORS: DESCRIPTORS: BURNING

## 2022-03-03 ASSESSMENT — PULMONARY FUNCTION TESTS
PIF_VALUE: 27
PIF_VALUE: 30
PIF_VALUE: 29
PIF_VALUE: 32
PIF_VALUE: 27

## 2022-03-03 ASSESSMENT — PAIN - FUNCTIONAL ASSESSMENT: PAIN_FUNCTIONAL_ASSESSMENT: 0-10

## 2022-03-03 ASSESSMENT — PAIN SCALES - GENERAL: PAINLEVEL_OUTOF10: 10

## 2022-03-03 ASSESSMENT — PAIN DESCRIPTION - PAIN TYPE: TYPE: ACUTE PAIN

## 2022-03-03 NOTE — TELEPHONE ENCOUNTER
Writer contacted Dr. Ama Poe to inform of 30 day readmission risk. Writer was informed there is no decision on disposition at this time.      Call Back: If you need to call back to inform of disposition you can contact me at [phone]

## 2022-03-03 NOTE — ED PROVIDER NOTES
[address] [facility]  EMERGENCY DEPARTMENT ENCOUNTER      Pt Name: Ashley Alford  MRN: [id number]  [place] 1950  Date of evaluation: 3/3/2022  Provider: Shivani Melendez, [address] [place] [address]       Chief Complaint   Patient presents with    Rectal Pain     Pt from [facility] c/o rectal pain x 2 days along with vaginal and eye burning. HISTORY OF PRESENT ILLNESS   (Location/Symptom, Timing/Onset, Context/Setting, Quality, Duration, Modifying Factors, Severity)  Note limiting factors. Ashley Alford is a 80 y.o. female who presents to the emergency department complaining of patient presenting with rectal pain. Per chart review patient has had rectal pain for years, was just admitted in February, and that was one of her document complaints. Daughter at that time reported that has been ongoing for at least 3 years. Patient was advised by GI her symptoms were thought to be related to anal fissure. Patient reportedly receiving suppositories but these seem not to be working. Does have intermittent constipation and reports that she is on stool softeners at her facility. Patient also complaining of vaginal irritation and pain she denies knowingly having discharge. On exam here she does have white discharge was recently on antibiotics for treatment of pneumonia. Third complaint is eye discharge. States that she has some irritation. No discharge currently but states that her eyes are matted shut in the a.m. Denies trauma, contact exposure, or vision change. Nursing Notes were reviewed.     PAST MEDICAL HISTORY     Past Medical History:   Diagnosis Date    Acute and chronic respiratory failure (acute-on-chronic) (HCC)     Acute blood loss anemia 7/1/2020    Acute deep vein thrombosis (DVT) of brachial vein of left upper extremity ([address] 2/20/2019    Formatting of this note might be different from the original. Dx February 2019    Anxiety disorder     Atherosclerotic heart disease of native coronary artery without angina pectoris     Bleeding hemorrhoids 6/29/2020    Cerebellar infarct ([facility] 9/29/2019    C[facility]bral infarction ([facility]     Chronic pain syndrome     Dependence on renal dialysis ([facility]     Dependence on respirator ([facility])     End stage renal disease ([facility])     Gastro-esophageal reflux disease with esophagitis, without bleeding     Insomnia     [facility] depressive [facility] recurrent [facility]     Morbid obesity due to excess calories ([facility])     Muscle weakness     Obstructive sleep apnea (adult) (pediatric)     Other hyperlipidemia     Other voice and resonance disorders     Personal history of COVID-19     Pulmonary hypertension ([facility]     Tracheostomy status ([facility]     Type 2 diabetes mellitus without complications ([facility]     Unspecified atrial fibrillation ([facility]          SURGICAL HISTORY     History reviewed. No pertinent surgical history. CURRENT MEDICATIONS       Previous Medications    ACETAMINOPHEN (TYLENOL) 325 MG TABLET    Take 650 mg by mouth every 6 hours as needed for Pain    ASPIRIN 81 MG EC TABLET    Take 81 mg by mouth daily    ATORVASTATIN (LIPITOR) 80 MG TABLET    Take 80 mg by mouth at bedtime    CALCIUM ACETATE 667 MG TABS    Take 1,334 mg by mouth 3 times daily (with meals)    CHLORHEXIDINE (PERIDEX) 0.12 % SOLUTION    Take 15 mLs by mouth 2 times daily    CYANOCOBALAMIN 1000 MCG TABLET    Take 500 mcg by mouth daily    DEXTROMETHORPHAN-GUAIFENESIN (MUCINEX DM)  MG PER EXTENDED RELEASE TABLET    Take 1 tablet by mouth 2 times daily     DIPHENHYDRAMINE (BENADRYL) 25 MG CAPSULE    Take 25 mg by mouth every 8 hours as needed     DOCUSATE SODIUM (COLACE) 100 MG CAPSULE    Take 100 mg by mouth 2 times daily    FUROSEMIDE (LASIX) 20 MG TABLET    Take 20 mg by mouth three times a week MWF    HYDROCORTISONE (PREPARATION H) 1 % CREAM    Apply topically 2 times daily Apply topically 2 times daily.     INSULIN GLARGINE (LANTUS) 100 UNIT/ML INJECTION VIAL Inject 15 Units into the skin daily    INSULIN NPH (HUMULIN N;NOVOLIN N) 100 UNIT/ML INJECTION VIAL    Inject into the skin every 6 hours     IPRATROPIUM-ALBUTEROL (DUONEB) 0.5-2.5 (3) MG/3ML SOLN NEBULIZER SOLUTION    Inhale 1 vial into the lungs every 4 hours    LACTULOSE ENCEPHALOPATHY 10 GM/15ML SOLN SOLUTION    Take 20 g by mouth daily as needed     LOPERAMIDE (IMODIUM) 2 MG CAPSULE    Take 2 mg by mouth 4 times daily as needed for Diarrhea    METOPROLOL SUCCINATE (TOPROL XL) 25 MG EXTENDED RELEASE TABLET    Take 0.5 tablets by mouth daily    MIDODRINE (PROAMATINE) 5 MG TABLET    Take 10 mg by mouth three times a week Tues thurs sat pre dialysis    MIDODRINE (PROAMATINE) 5 MG TABLET    Take 10 mg by mouth three times a week Tues Thurs Sat- mid dialysis treatment    OMEPRAZOLE (PRILOSEC) 20 MG DELAYED RELEASE CAPSULE    Take 20 mg by mouth daily    ONDANSETRON (ZOFRAN) 4 MG TABLET    Take 4 mg by mouth every 8 hours as needed for Nausea or Vomiting    POLYETHYLENE GLYCOL (GLYCOLAX) 17 G PACKET    Take 17 g by mouth daily as needed for Constipation    QUETIAPINE (SEROQUEL) 25 MG TABLET    Take 12.5 mg by mouth nightly    SENNOSIDES-DOCUSATE SODIUM (SENOKOT-S) 8.6-50 MG TABLET    Take 2 tablets by mouth at bedtime     SERTRALINE (ZOLOFT) 50 MG TABLET    Take 50 mg by mouth daily    SODIUM POLYSTYRENE (KAYEXALATE) 15 GM/60ML SUSPENSION    Take 15 g by mouth 4 times daily Sun, Mon, Wed, Fri    TERAZOSIN (HYTRIN) 1 MG CAPSULE    Take 1 mg by mouth nightly    WARFARIN (COUMADIN) 6 MG TABLET    Take 6 mg by mouth nightly       ALLERGIES     Haloperidol lactate and Ziprasidone hcl    FAMILY HISTORY     History reviewed. No pertinent family history.        SOCIAL HISTORY       Social History     Socioeconomic History    Marital status:      Spouse name: None    Number of children: None    Years of education: None    Highest education level: None   Occupational History    None   Tobacco Use    Smoking status: Former Smoker    Smokeless tobacco: Never Used   Substance and Sexual Activity    Alcohol use: Never    Drug use: Never    Sexual activity: None   Other Topics Concern    None   Social History Narrative    None     Social Determinants of Health     Financial Resource Strain:     Difficulty of Paying Living Expenses: Not on file   Food Insecurity:     Worried About Running Out of Food in the Last Year: Not on file    Voloydmyr of Food in the Last Year: Not on file   Transportation Needs:     Lack of Transportation (Medical): Not on file    Lack of Transportation (Non-Medical): Not on file   Physical Activity:     Days of Exercise per Week: Not on file    Minutes of Exercise per Session: Not on file   Stress:     Feeling of Stress : Not on file   Social Connections:     Frequency of Communication with Friends and Family: Not on file    Frequency of Social Gatherings with Friends and Family: Not on file    Attends Holiness Services: Not on file    Active Member of [address] [place] [address] or Organizations: Not on file    Attends Club or Organization Meetings: Not on file    Marital Status: Not on file   Intimate Partner Violence:     Fear of Current or Ex-Partner: Not on file    Emotionally Abused: Not on file    Physically Abused: Not on file    Sexually Abused: Not on file   Housing Stability:     Unable to Pay for Housing in the Last Year: Not on file    Number of [place] in the Last Year: Not on file    Unstable Housing in the Last Year: Not on file       SCREENINGS        [place] Coma Scale  Eye Opening: Spontaneous  Best Verbal Response: Oriented  Best Motor Response: Obeys commands  [place] Coma Scale Score: 15                   REVIEW OF SYSTEMS    (2-9 systems for level 4, 10 or more for level 5)   Review of Systems   Constitutional: Negative for chills and fever. HENT: Negative. Eyes: Positive for discharge and itching. Negative for visual disturbance.    Respiratory: Negative for cough and shortness of breath. Cardiovascular: Negative for chest pain. Gastrointestinal: Positive for constipation and rectal pain. Negative for abdominal pain, nausea and vomiting. Musculoskeletal: Negative for back pain. Skin: Negative for rash. Allergic/Immunologic: Negative for environmental allergies. Hematological: Negative for adenopathy. Psychiatric/Behavioral: Negative for confusion. PHYSICAL EXAM    (up to 7 for level 4, 8 or more for level 5)   RECENT VITALS:     Temp: 97.7 °F (36.5 °C),  Pulse: 67, Resp: 16, BP: 139/65, SpO2: 100 %    Physical Exam  Constitutional:       Appearance: She is not ill-appearing. HENT:      Head: Normocephalic and atraumatic. Nose: Nose normal.   Eyes:      General:         Right eye: Discharge present. Left eye: Discharge present. Pupils: Pupils are equal, round, and reactive to light. Cardiovascular:      Rate and Rhythm: Normal rate and regular rhythm. Pulmonary:      Effort: No respiratory distress. Comments: Trach dependent. Abdominal:      General: There is no distension. Tenderness: There is no abdominal tenderness. Genitourinary:     Comments: White vaginal discharge. No masses, no lesions. Tenderness perirectal without erythema swelling no obvious thrombosed hemorrhoid. Cannot visualize fissure. Neurological:      General: No focal deficit present. Mental Status: She is alert. DIAGNOSTIC RESULTS     EKG: All EKG's are interpreted by the Emergency Department Physician who either signs or Co-signs this chart in the absence of a cardiologist.    RADIOLOGY:   Non-plain film images such as CT, Ultrasound and MRI are read by the radiologist. Plain radiographic images are visualized and preliminarily interpreted by the emergency physician.     Interpretation per the Radiologist below, if available at the time of this note:    CT PELVIS WO CONTRAST Additional Contrast? None   Final Result   Large rectal stool burden, with the rectum distended to up to 10 cm in   diameter. No wall thickening or pericolonic inflammatory changes. No bowel   obstruction. This may account for the patient's stated history of rectal   pain. Bulky leiomyomatous uterus. LABS:  Labs Reviewed   WET PREP, GENITAL - Abnormal; Notable for the following components:       Result Value    Clue Cells, Wet Prep 2+ (*)     All other components within normal limits   CULTURE, URINE   URINALYSIS       All other labs were within normal range or not returned as of this dictation. EMERGENCY DEPARTMENT COURSE and DIFFERENTIAL DIAGNOSIS/MDM:   Page Queen is a 80 y.o. female who presents to the emergency department with the complaint of multiple complaints including vaginal pain irritation, rectal pain, longstanding history of same recurring over the last of 4 to 5days, as well as eye irritation and discharge. Patient has longstanding rectal pain documented well in the chart recently saw GI in hospital several weeks ago thought to be due to anal fissure does have a history of constipation is on stool softeners. Denies abdominal pain there is no signs of perianal abscess on physical exam cannot visualize fissure at this time. Does have tenderness throughout the  perianal area. Vaginal exam was performed with female RN assisting, did have white vaginal discharge recent antibiotics concern for possible yeast infection with the burning sensation she describing will evaluate wet prep. Patient denies pupils equal and reactive no visual disturbance there is trace discharge bilaterally, conjunctive are normal.  Will treat with erythromycin eyedrops. Due to body habitus and somewhat limitations with physical exam due to that I will obtain CT pelvis is able otherwise at this time and not feel she warrants full laboratory evaluation. Will send wet prep to evaluate for yeast infection. Treat as needed.   Does have a history of end-stage renal disease on dialysis. Makes urine due to vaginal irritation will check a straight cath urine sample. BV positive treated with Flagyl. Patient's rectal pain did seem to improve post CT after she had a large volume bowel movement. She did have a dilated rectum seen on imaging with overall improvement condition, will discharge to follow-up with PCP discharge back to facility. CRITICAL CARE TIME   Total Critical Care time was 0 minutes, excluding separately reportable procedures. There was a high probability of clinically significant/life threatening deterioration in the patient's condition which required my urgent intervention. Clinical concern   Intervention     CONSULTS:  None    PROCEDURES:  Unless otherwise noted below, none     Procedures        FINAL IMPRESSION      1. BV (bacterial vaginosis)    2. Constipation, unspecified constipation type    3. Conjunctivitis of both eyes, unspecified conjunctivitis type          DISPOSITION/PLAN   DISPOSITION Decision To Discharge 03/03/2022 04:55:11 PM      PAT[facility]:  [facility]  [address] [facility]  [phone]    If symptoms worsen    Mallory Flaherty MD  [address] [place] [address] [address]    Schedule an appointment as soon as possible for a visit in 2 days        DISCHARGE MEDICATIONS:  New Prescriptions    ERYTHROMYCIN (ROMYCIN) 5 MG/GM OPHTHALMIC OINTMENT    Place into both eyes every 6 hours for 7 days    HYDROCORTISONE (ANUSOL-HC) 2.5 % CREA RECTAL CREAM    Place rectally 2 times daily    METRONIDAZOLE (FLAGYL) 500 MG TABLET    Take 1 tablet by mouth 3 times daily for 7 days     Controlled Substances Monitoring:     No flowsheet data found.     (Please note that portions of this note were completed with a voice recognition program.  Efforts were made to edit the dictations but occasionally words are mis-transcribed.)    Maureen Castaneda, DO (electronically

## 2022-03-03 NOTE — ED NOTES
Attempt to straight cath pt x 2 s success. Pt noted to have white thick discharge to tho area.  made aware. Danilo Carolina, RN       Danilo Carolina RN  03/03/22 1579

## 2022-03-04 VITALS
TEMPERATURE: 97.7 F | OXYGEN SATURATION: 100 % | HEART RATE: 68 BPM | DIASTOLIC BLOOD PRESSURE: 65 MMHG | RESPIRATION RATE: 29 BRPM | SYSTOLIC BLOOD PRESSURE: 139 MMHG

## 2022-03-04 PROCEDURE — 2700000000 HC OXYGEN THERAPY PER DAY

## 2022-03-04 PROCEDURE — 6370000000 HC RX 637 (ALT 250 FOR IP): Performed by: STUDENT IN AN ORGANIZED HEALTH CARE EDUCATION/TRAINING PROGRAM

## 2022-03-04 PROCEDURE — 94761 N-INVAS EAR/PLS OXIMETRY MLT: CPT

## 2022-03-04 RX ADMIN — HYDROCORTISONE 2.5%: 25 CREAM TOPICAL at 00:11

## 2022-03-04 ASSESSMENT — PULMONARY FUNCTION TESTS
PIF_VALUE: 33
PIF_VALUE: 26

## 2022-03-04 NOTE — PROGRESS NOTES
03/03/22 2013   Vent Information   Vent Type 980   Vent Mode AC/VC   Vt Ordered 400 mL   Rate Set 16 bmp   Peak Flow 50 L/min   Pressure Support 0 cmH20   FiO2  60 %   SpO2 100 %   SpO2/FiO2 ratio 166.67   Sensitivity 3   PEEP/CPAP 5   Humidification Source Heated wire   Humidification Temp 37   Humidification Temp Measured 37   Circuit Condensation Drained   Vent Patient Data   Peak Inspiratory Pressure 32 cmH2O   Mean Airway Pressure 11 cmH20   Rate Measured 16 br/min   Vt Exhaled 415 mL   Minute Volume 6.93 Liters   I:E Ratio 1:3.30   Plateau Pressure 21 HAR76   Static Compliance 26 mL/cmH2O   Dynamic Compliance 15 mL/cmH2O   Cough/Sputum   Sputum How Obtained Tracheal;Suctioned   Cough Productive   Sputum Amount Small   Sputum Color Creamy   Tenacity Thick   Spontaneous Breathing Trial (SBT) RT Doc   Pulse 67   Breath Sounds   Right Upper Lobe Diminished   Right Middle Lobe Diminished   Right Lower Lobe Diminished   Left Upper Lobe Diminished   Left Lower Lobe Diminished   Additional Respiratory  Assessments   Resp 16   Position Semi-Ríos's   Alarm Settings   High Pressure Alarm 45 cmH2O   Low Minute Volume Alarm 4 L/min   High Respiratory Rate 40 br/min   Patient Observation   Observations #6 JULIAN XLT DISTAL. AMBU BAG AT HEAD OF BED. WATER GOOD.

## 2022-03-04 NOTE — PROGRESS NOTES
03/04/22 0333   Vent Information   Vent Type 980   Vent Mode AC/VC   Vt Ordered 400 mL   Rate Set 16 bmp   Peak Flow 50 L/min   Pressure Support 0 cmH20   FiO2  60 %   Sensitivity 3   PEEP/CPAP 5   Humidification Source Heated wire   Humidification Temp 37   Humidification Temp Measured 36.8   Circuit Condensation Drained   Vent Patient Data   Peak Inspiratory Pressure 33 cmH2O   Mean Airway Pressure 16 cmH20   Rate Measured 29 br/min   Vt Exhaled 395 mL   Minute Volume 10.8 Liters   I:E Ratio 1.00:1   Plateau Pressure 24 THY19   Cough/Sputum   Sputum How Obtained Tracheal;Suctioned   Cough Productive   Sputum Amount Small   Sputum Color Creamy   Tenacity Thick   Breath Sounds   Right Upper Lobe Diminished   Right Middle Lobe Diminished   Right Lower Lobe Diminished   Left Upper Lobe Diminished   Left Lower Lobe Diminished   Additional Respiratory  Assessments   Resp 29   Position Semi-Ríos's   Alarm Settings   High Pressure Alarm 45 cmH2O   Low Minute Volume Alarm 4 L/min   High Respiratory Rate 40 br/min   Patient Observation   Observations #6 SHILEY XLT DISTAL. AMBU BAG AT HEAD OF BED. WATER GOOD.

## 2022-03-04 NOTE — ED NOTES
Patient repeatedly hitting call light thru entire shift. Staff would answer call light and resolve patients needs. Immediately upon exiting room, patient would hit call light again. If staff did not respond to call light immediately, patient would begin hitting her call light on the bed rail. Patient asked many times thru shift to not hit her call light with no result.       Pb Ya RN  03/04/22 9531

## 2022-03-04 NOTE — PROGRESS NOTES
03/03/22 2343   Vent Information   Vent Type 980   Vent Mode AC/VC   Vt Ordered 400 mL   Rate Set 16 bmp   Peak Flow 50 L/min   Pressure Support 0 cmH20   FiO2  60 %   Sensitivity 3   PEEP/CPAP 5   Humidification Source Heated wire   Humidification Temp 37   Humidification Temp Measured 37   Circuit Condensation Drained   Vent Patient Data   Peak Inspiratory Pressure 30 cmH2O   Mean Airway Pressure 11 cmH20   Rate Measured 17 br/min   Vt Exhaled 414 mL   Minute Volume 7.04 Liters   I:E Ratio 1:3.30   Plateau Pressure 19 TIG03   Cough/Sputum   Sputum How Obtained Tracheal;Suctioned   Cough Productive   Sputum Amount Small   Sputum Color Creamy   Tenacity Thick   Spontaneous Breathing Trial (SBT) RT Doc   Pulse 70   Breath Sounds   Right Upper Lobe Diminished   Right Middle Lobe Diminished   Right Lower Lobe Diminished   Left Upper Lobe Diminished   Left Lower Lobe Diminished   Additional Respiratory  Assessments   Resp 17   Position Semi-Ríos's   Alarm Settings   High Pressure Alarm 45 cmH2O   Low Minute Volume Alarm 4 L/min   High Respiratory Rate 40 br/min   Patient Observation   Observations #6 JULIAN XLT DISTAL. AMBU BAG AT HEAD OF BED. WATER GOOD.

## 2022-03-04 NOTE — ED NOTES
Patient refused to wear BP cuff or cardiac leads. Would pull them off as soon as you placed them.       Moni Ramos RN  03/04/22 0397

## 2022-04-26 ENCOUNTER — HOSPITAL ENCOUNTER (INPATIENT)
Age: 72
LOS: 5 days | Discharge: NURSING, REHAB OR LONG-TERM CARE | DRG: 813 | End: 2022-05-01
Attending: INTERNAL MEDICINE | Admitting: INTERNAL MEDICINE
Payer: COMMERCIAL

## 2022-04-26 ENCOUNTER — APPOINTMENT (OUTPATIENT)
Dept: GENERAL RADIOLOGY | Age: 72
End: 2022-04-26
Payer: COMMERCIAL

## 2022-04-26 ENCOUNTER — APPOINTMENT (OUTPATIENT)
Dept: GENERAL RADIOLOGY | Age: 72
DRG: 813 | End: 2022-04-26
Attending: INTERNAL MEDICINE
Payer: COMMERCIAL

## 2022-04-26 ENCOUNTER — HOSPITAL ENCOUNTER (EMERGENCY)
Age: 72
Discharge: ANOTHER HOSPITAL | End: 2022-04-26
Attending: EMERGENCY MEDICINE
Payer: COMMERCIAL

## 2022-04-26 VITALS
WEIGHT: 291 LBS | SYSTOLIC BLOOD PRESSURE: 122 MMHG | RESPIRATION RATE: 16 BRPM | BODY MASS INDEX: 49.68 KG/M2 | OXYGEN SATURATION: 98 % | TEMPERATURE: 98 F | HEART RATE: 79 BPM | HEIGHT: 64 IN | DIASTOLIC BLOOD PRESSURE: 62 MMHG

## 2022-04-26 DIAGNOSIS — K62.89 RECTAL PAIN: Primary | ICD-10-CM

## 2022-04-26 DIAGNOSIS — Z79.01 ANTICOAGULATED ON COUMADIN: Primary | ICD-10-CM

## 2022-04-26 PROBLEM — N93.9 VAGINAL BLEEDING: Status: ACTIVE | Noted: 2022-04-26

## 2022-04-26 LAB
A/G RATIO: 0.5 (ref 1.1–2.2)
ABO/RH: NORMAL
ALBUMIN SERPL-MCNC: 2.6 G/DL (ref 3.4–5)
ALP BLD-CCNC: 107 U/L (ref 40–129)
ALT SERPL-CCNC: 7 U/L (ref 10–40)
ANION GAP SERPL CALCULATED.3IONS-SCNC: 14 MMOL/L (ref 3–16)
ANTIBODY SCREEN: NORMAL
ANTIBODY SCREEN: NORMAL
AST SERPL-CCNC: 7 U/L (ref 15–37)
BASOPHILS ABSOLUTE: 0.1 K/UL (ref 0–0.2)
BASOPHILS RELATIVE PERCENT: 0.3 %
BILIRUB SERPL-MCNC: 0.4 MG/DL (ref 0–1)
BUN BLDV-MCNC: 68 MG/DL (ref 7–20)
CALCIUM SERPL-MCNC: 9.5 MG/DL (ref 8.3–10.6)
CHLORIDE BLD-SCNC: 90 MMOL/L (ref 99–110)
CO2: 30 MMOL/L (ref 21–32)
CREAT SERPL-MCNC: 6.7 MG/DL (ref 0.6–1.2)
EOSINOPHILS ABSOLUTE: 0.3 K/UL (ref 0–0.6)
EOSINOPHILS RELATIVE PERCENT: 1.7 %
GFR AFRICAN AMERICAN: 7
GFR NON-AFRICAN AMERICAN: 6
GLUCOSE BLD-MCNC: 108 MG/DL (ref 70–99)
GLUCOSE BLD-MCNC: 94 MG/DL (ref 70–99)
HCT VFR BLD CALC: 23.2 % (ref 36–48)
HCT VFR BLD CALC: 23.3 % (ref 36–48)
HCT VFR BLD CALC: 23.3 % (ref 36–48)
HEMOGLOBIN: 7 G/DL (ref 12–16)
HEMOGLOBIN: 7.1 G/DL (ref 12–16)
HEMOGLOBIN: 7.2 G/DL (ref 12–16)
INR BLD: 4.86 (ref 0.88–1.12)
LACTIC ACID, SEPSIS: 1 MMOL/L (ref 0.4–1.9)
LYMPHOCYTES ABSOLUTE: 0.8 K/UL (ref 1–5.1)
LYMPHOCYTES RELATIVE PERCENT: 4.3 %
MCH RBC QN AUTO: 29.5 PG (ref 26–34)
MCHC RBC AUTO-ENTMCNC: 30.9 G/DL (ref 31–36)
MCV RBC AUTO: 95.7 FL (ref 80–100)
MONOCYTES ABSOLUTE: 0.5 K/UL (ref 0–1.3)
MONOCYTES RELATIVE PERCENT: 2.6 %
NEUTROPHILS ABSOLUTE: 17.5 K/UL (ref 1.7–7.7)
NEUTROPHILS RELATIVE PERCENT: 91.1 %
OCCULT BLOOD DIAGNOSTIC: NORMAL
PDW BLD-RTO: 18.9 % (ref 12.4–15.4)
PERFORMED ON: ABNORMAL
PLATELET # BLD: 516 K/UL (ref 135–450)
PMV BLD AUTO: 7.7 FL (ref 5–10.5)
POTASSIUM REFLEX MAGNESIUM: 4.1 MMOL/L (ref 3.5–5.1)
PRO-BNP: ABNORMAL PG/ML (ref 0–124)
PROTHROMBIN TIME: 58.6 SEC (ref 9.9–12.7)
RBC # BLD: 2.42 M/UL (ref 4–5.2)
REASON FOR REJECTION: NORMAL
REJECTED TEST: NORMAL
SODIUM BLD-SCNC: 134 MMOL/L (ref 136–145)
TOTAL PROTEIN: 7.7 G/DL (ref 6.4–8.2)
WBC # BLD: 19.2 K/UL (ref 4–11)

## 2022-04-26 PROCEDURE — 85610 PROTHROMBIN TIME: CPT

## 2022-04-26 PROCEDURE — 86850 RBC ANTIBODY SCREEN: CPT

## 2022-04-26 PROCEDURE — 2700000000 HC OXYGEN THERAPY PER DAY

## 2022-04-26 PROCEDURE — 93005 ELECTROCARDIOGRAM TRACING: CPT | Performed by: INTERNAL MEDICINE

## 2022-04-26 PROCEDURE — 83605 ASSAY OF LACTIC ACID: CPT

## 2022-04-26 PROCEDURE — 83880 ASSAY OF NATRIURETIC PEPTIDE: CPT

## 2022-04-26 PROCEDURE — 82728 ASSAY OF FERRITIN: CPT

## 2022-04-26 PROCEDURE — 6360000002 HC RX W HCPCS: Performed by: INTERNAL MEDICINE

## 2022-04-26 PROCEDURE — 1200000000 HC SEMI PRIVATE

## 2022-04-26 PROCEDURE — 99285 EMERGENCY DEPT VISIT HI MDM: CPT

## 2022-04-26 PROCEDURE — 36415 COLL VENOUS BLD VENIPUNCTURE: CPT

## 2022-04-26 PROCEDURE — 86923 COMPATIBILITY TEST ELECTRIC: CPT

## 2022-04-26 PROCEDURE — 82746 ASSAY OF FOLIC ACID SERUM: CPT

## 2022-04-26 PROCEDURE — 85014 HEMATOCRIT: CPT

## 2022-04-26 PROCEDURE — 87150 DNA/RNA AMPLIFIED PROBE: CPT

## 2022-04-26 PROCEDURE — 86901 BLOOD TYPING SEROLOGIC RH(D): CPT

## 2022-04-26 PROCEDURE — 82607 VITAMIN B-12: CPT

## 2022-04-26 PROCEDURE — P9016 RBC LEUKOCYTES REDUCED: HCPCS

## 2022-04-26 PROCEDURE — 85018 HEMOGLOBIN: CPT

## 2022-04-26 PROCEDURE — 85025 COMPLETE CBC W/AUTO DIFF WBC: CPT

## 2022-04-26 PROCEDURE — 96376 TX/PRO/DX INJ SAME DRUG ADON: CPT

## 2022-04-26 PROCEDURE — 2580000003 HC RX 258: Performed by: INTERNAL MEDICINE

## 2022-04-26 PROCEDURE — 86900 BLOOD TYPING SEROLOGIC ABO: CPT

## 2022-04-26 PROCEDURE — 2500000003 HC RX 250 WO HCPCS: Performed by: NURSE PRACTITIONER

## 2022-04-26 PROCEDURE — G0328 FECAL BLOOD SCRN IMMUNOASSAY: HCPCS

## 2022-04-26 PROCEDURE — 83540 ASSAY OF IRON: CPT

## 2022-04-26 PROCEDURE — 96365 THER/PROPH/DIAG IV INF INIT: CPT

## 2022-04-26 PROCEDURE — 87040 BLOOD CULTURE FOR BACTERIA: CPT

## 2022-04-26 PROCEDURE — 2580000003 HC RX 258: Performed by: NURSE PRACTITIONER

## 2022-04-26 PROCEDURE — 83550 IRON BINDING TEST: CPT

## 2022-04-26 PROCEDURE — 80053 COMPREHEN METABOLIC PANEL: CPT

## 2022-04-26 PROCEDURE — 96375 TX/PRO/DX INJ NEW DRUG ADDON: CPT

## 2022-04-26 PROCEDURE — 73620 X-RAY EXAM OF FOOT: CPT

## 2022-04-26 PROCEDURE — 6370000000 HC RX 637 (ALT 250 FOR IP): Performed by: INTERNAL MEDICINE

## 2022-04-26 PROCEDURE — 6360000002 HC RX W HCPCS: Performed by: NURSE PRACTITIONER

## 2022-04-26 PROCEDURE — 73610 X-RAY EXAM OF ANKLE: CPT

## 2022-04-26 PROCEDURE — 96367 TX/PROPH/DG ADDL SEQ IV INF: CPT

## 2022-04-26 PROCEDURE — 2500000003 HC RX 250 WO HCPCS: Performed by: INTERNAL MEDICINE

## 2022-04-26 PROCEDURE — 71045 X-RAY EXAM CHEST 1 VIEW: CPT

## 2022-04-26 RX ORDER — INSULIN LISPRO 100 [IU]/ML
0-3 INJECTION, SOLUTION INTRAVENOUS; SUBCUTANEOUS NIGHTLY
Status: DISCONTINUED | OUTPATIENT
Start: 2022-04-26 | End: 2022-05-01 | Stop reason: HOSPADM

## 2022-04-26 RX ORDER — DIPHENHYDRAMINE HCL 25 MG
25 CAPSULE ORAL EVERY 8 HOURS PRN
Status: DISCONTINUED | OUTPATIENT
Start: 2022-04-26 | End: 2022-04-26

## 2022-04-26 RX ORDER — HYDROCORTISONE 25 MG/G
CREAM TOPICAL 2 TIMES DAILY
Status: DISCONTINUED | OUTPATIENT
Start: 2022-04-26 | End: 2022-04-26

## 2022-04-26 RX ORDER — IPRATROPIUM BROMIDE AND ALBUTEROL SULFATE 2.5; .5 MG/3ML; MG/3ML
1 SOLUTION RESPIRATORY (INHALATION)
Status: DISCONTINUED | OUTPATIENT
Start: 2022-04-27 | End: 2022-05-01 | Stop reason: HOSPADM

## 2022-04-26 RX ORDER — ACETAMINOPHEN 650 MG/1
650 SUPPOSITORY RECTAL EVERY 6 HOURS PRN
Status: DISCONTINUED | OUTPATIENT
Start: 2022-04-26 | End: 2022-05-01 | Stop reason: HOSPADM

## 2022-04-26 RX ORDER — DOCUSATE SODIUM 100 MG/1
100 CAPSULE, LIQUID FILLED ORAL 2 TIMES DAILY
Status: DISCONTINUED | OUTPATIENT
Start: 2022-04-26 | End: 2022-05-01 | Stop reason: HOSPADM

## 2022-04-26 RX ORDER — LABETALOL HYDROCHLORIDE 5 MG/ML
10 INJECTION, SOLUTION INTRAVENOUS EVERY 4 HOURS PRN
Status: DISCONTINUED | OUTPATIENT
Start: 2022-04-26 | End: 2022-05-01 | Stop reason: HOSPADM

## 2022-04-26 RX ORDER — DEXTROSE MONOHYDRATE 50 MG/ML
100 INJECTION, SOLUTION INTRAVENOUS PRN
Status: DISCONTINUED | OUTPATIENT
Start: 2022-04-26 | End: 2022-05-01 | Stop reason: HOSPADM

## 2022-04-26 RX ORDER — PANTOPRAZOLE SODIUM 40 MG/1
40 TABLET, DELAYED RELEASE ORAL
Status: DISCONTINUED | OUTPATIENT
Start: 2022-04-27 | End: 2022-05-01 | Stop reason: HOSPADM

## 2022-04-26 RX ORDER — NICOTINE POLACRILEX 4 MG
15 LOZENGE BUCCAL PRN
Status: DISCONTINUED | OUTPATIENT
Start: 2022-04-26 | End: 2022-05-01 | Stop reason: HOSPADM

## 2022-04-26 RX ORDER — IPRATROPIUM BROMIDE AND ALBUTEROL SULFATE 2.5; .5 MG/3ML; MG/3ML
1 SOLUTION RESPIRATORY (INHALATION) EVERY 4 HOURS PRN
Status: DISCONTINUED | OUTPATIENT
Start: 2022-04-26 | End: 2022-05-01 | Stop reason: HOSPADM

## 2022-04-26 RX ORDER — INSULIN GLARGINE 100 [IU]/ML
10 INJECTION, SOLUTION SUBCUTANEOUS DAILY
Status: DISCONTINUED | OUTPATIENT
Start: 2022-04-27 | End: 2022-05-01 | Stop reason: HOSPADM

## 2022-04-26 RX ORDER — IPRATROPIUM BROMIDE AND ALBUTEROL SULFATE 2.5; .5 MG/3ML; MG/3ML
1 SOLUTION RESPIRATORY (INHALATION) EVERY 4 HOURS
Status: DISCONTINUED | OUTPATIENT
Start: 2022-04-26 | End: 2022-04-26

## 2022-04-26 RX ORDER — SODIUM CHLORIDE 0.9 % (FLUSH) 0.9 %
10 SYRINGE (ML) INJECTION EVERY 12 HOURS SCHEDULED
Status: DISCONTINUED | OUTPATIENT
Start: 2022-04-26 | End: 2022-05-01 | Stop reason: HOSPADM

## 2022-04-26 RX ORDER — ACETAMINOPHEN 325 MG/1
650 TABLET ORAL EVERY 6 HOURS PRN
Status: DISCONTINUED | OUTPATIENT
Start: 2022-04-26 | End: 2022-05-01 | Stop reason: HOSPADM

## 2022-04-26 RX ORDER — QUETIAPINE FUMARATE 25 MG/1
12.5 TABLET, FILM COATED ORAL NIGHTLY
Status: DISCONTINUED | OUTPATIENT
Start: 2022-04-26 | End: 2022-05-01 | Stop reason: HOSPADM

## 2022-04-26 RX ORDER — ONDANSETRON 2 MG/ML
4 INJECTION INTRAMUSCULAR; INTRAVENOUS ONCE
Status: COMPLETED | OUTPATIENT
Start: 2022-04-26 | End: 2022-04-26

## 2022-04-26 RX ORDER — CHLORHEXIDINE GLUCONATE 0.12 MG/ML
15 RINSE ORAL 2 TIMES DAILY
Status: DISCONTINUED | OUTPATIENT
Start: 2022-04-26 | End: 2022-04-26

## 2022-04-26 RX ORDER — ATORVASTATIN CALCIUM 80 MG/1
80 TABLET, FILM COATED ORAL NIGHTLY
Status: DISCONTINUED | OUTPATIENT
Start: 2022-04-26 | End: 2022-05-01 | Stop reason: HOSPADM

## 2022-04-26 RX ORDER — SODIUM CHLORIDE 9 MG/ML
1000 INJECTION, SOLUTION INTRAVENOUS CONTINUOUS
Status: DISCONTINUED | OUTPATIENT
Start: 2022-04-26 | End: 2022-04-27

## 2022-04-26 RX ORDER — SODIUM CHLORIDE 9 MG/ML
INJECTION, SOLUTION INTRAVENOUS PRN
Status: DISCONTINUED | OUTPATIENT
Start: 2022-04-26 | End: 2022-05-01 | Stop reason: HOSPADM

## 2022-04-26 RX ORDER — MORPHINE SULFATE 2 MG/ML
2 INJECTION, SOLUTION INTRAMUSCULAR; INTRAVENOUS ONCE
Status: COMPLETED | OUTPATIENT
Start: 2022-04-26 | End: 2022-04-26

## 2022-04-26 RX ORDER — ONDANSETRON 2 MG/ML
4 INJECTION INTRAMUSCULAR; INTRAVENOUS EVERY 6 HOURS PRN
Status: DISCONTINUED | OUTPATIENT
Start: 2022-04-26 | End: 2022-05-01 | Stop reason: HOSPADM

## 2022-04-26 RX ORDER — SODIUM CHLORIDE 9 MG/ML
50 INJECTION, SOLUTION INTRAVENOUS ONCE
Status: DISCONTINUED | OUTPATIENT
Start: 2022-04-26 | End: 2022-04-26

## 2022-04-26 RX ORDER — TRANEXAMIC ACID 100 MG/ML
1000 INJECTION, SOLUTION INTRAVENOUS ONCE
Status: COMPLETED | OUTPATIENT
Start: 2022-04-26 | End: 2022-04-26

## 2022-04-26 RX ORDER — SODIUM CHLORIDE 0.9 % (FLUSH) 0.9 %
10 SYRINGE (ML) INJECTION PRN
Status: DISCONTINUED | OUTPATIENT
Start: 2022-04-26 | End: 2022-05-01 | Stop reason: HOSPADM

## 2022-04-26 RX ORDER — MAGNESIUM SULFATE IN WATER 40 MG/ML
2000 INJECTION, SOLUTION INTRAVENOUS PRN
Status: DISCONTINUED | OUTPATIENT
Start: 2022-04-26 | End: 2022-04-26

## 2022-04-26 RX ORDER — HYDROCODONE BITARTRATE AND ACETAMINOPHEN 10; 325 MG/1; MG/1
1 TABLET ORAL 2 TIMES DAILY
Status: ON HOLD | COMMUNITY
End: 2022-04-30 | Stop reason: SDUPTHER

## 2022-04-26 RX ORDER — POTASSIUM CHLORIDE 7.45 MG/ML
10 INJECTION INTRAVENOUS PRN
Status: DISCONTINUED | OUTPATIENT
Start: 2022-04-26 | End: 2022-04-26

## 2022-04-26 RX ORDER — INSULIN LISPRO 100 [IU]/ML
0-6 INJECTION, SOLUTION INTRAVENOUS; SUBCUTANEOUS
Status: DISCONTINUED | OUTPATIENT
Start: 2022-04-27 | End: 2022-05-01 | Stop reason: HOSPADM

## 2022-04-26 RX ORDER — DEXTROSE MONOHYDRATE 25 G/50ML
12.5 INJECTION, SOLUTION INTRAVENOUS PRN
Status: DISCONTINUED | OUTPATIENT
Start: 2022-04-26 | End: 2022-04-26 | Stop reason: RX

## 2022-04-26 RX ORDER — PROMETHAZINE HYDROCHLORIDE 25 MG/1
12.5 TABLET ORAL EVERY 6 HOURS PRN
Status: DISCONTINUED | OUTPATIENT
Start: 2022-04-26 | End: 2022-05-01 | Stop reason: HOSPADM

## 2022-04-26 RX ADMIN — ONDANSETRON HYDROCHLORIDE 4 MG: 2 INJECTION, SOLUTION INTRAMUSCULAR; INTRAVENOUS at 13:49

## 2022-04-26 RX ADMIN — QUETIAPINE FUMARATE 12.5 MG: 25 TABLET ORAL at 23:23

## 2022-04-26 RX ADMIN — DOCUSATE SODIUM 100 MG: 100 CAPSULE, LIQUID FILLED ORAL at 23:24

## 2022-04-26 RX ADMIN — TRANEXAMIC ACID 1000 MG: 1 INJECTION, SOLUTION INTRAVENOUS at 22:34

## 2022-04-26 RX ADMIN — TRANEXAMIC ACID 1000 MG: 1 INJECTION, SOLUTION INTRAVENOUS at 17:11

## 2022-04-26 RX ADMIN — ACETAMINOPHEN 650 MG: 325 TABLET ORAL at 23:24

## 2022-04-26 RX ADMIN — MORPHINE SULFATE 2 MG: 2 INJECTION, SOLUTION INTRAMUSCULAR; INTRAVENOUS at 13:50

## 2022-04-26 RX ADMIN — MORPHINE SULFATE 2 MG: 2 INJECTION, SOLUTION INTRAMUSCULAR; INTRAVENOUS at 15:30

## 2022-04-26 RX ADMIN — CEFTRIAXONE SODIUM 1000 MG: 1 INJECTION, POWDER, FOR SOLUTION INTRAMUSCULAR; INTRAVENOUS at 17:15

## 2022-04-26 RX ADMIN — PHYTONADIONE 10 MG: 10 INJECTION, EMULSION INTRAMUSCULAR; INTRAVENOUS; SUBCUTANEOUS at 13:55

## 2022-04-26 RX ADMIN — GUAIFENESIN AND DEXTROMETHORPHAN HYDROBROMIDE 1 TABLET: 600; 30 TABLET, EXTENDED RELEASE ORAL at 23:27

## 2022-04-26 RX ADMIN — SODIUM CHLORIDE 1000 ML: 9 INJECTION, SOLUTION INTRAVENOUS at 22:32

## 2022-04-26 RX ADMIN — VANCOMYCIN HYDROCHLORIDE 1750 MG: 10 INJECTION, POWDER, LYOPHILIZED, FOR SOLUTION INTRAVENOUS at 23:23

## 2022-04-26 RX ADMIN — ATORVASTATIN CALCIUM 80 MG: 80 TABLET, FILM COATED ORAL at 23:24

## 2022-04-26 ASSESSMENT — ENCOUNTER SYMPTOMS
ABDOMINAL PAIN: 0
RHINORRHEA: 0
SORE THROAT: 0
SHORTNESS OF BREATH: 0
COLOR CHANGE: 0
FACIAL SWELLING: 0

## 2022-04-26 ASSESSMENT — PAIN SCALES - GENERAL
PAINLEVEL_OUTOF10: 5
PAINLEVEL_OUTOF10: 10

## 2022-04-26 ASSESSMENT — PAIN DESCRIPTION - LOCATION
LOCATION: BUTTOCKS
LOCATION: BUTTOCKS

## 2022-04-26 NOTE — ED TRIAGE NOTES
Pt brought in by squad from dialysis. Pt received 30 minutes of dialysis today. Copious bleeding from dialysis port. Pt also bleeding from vagina. [facility] states pt's INR was 8 four days ago, coumadin had been held.

## 2022-04-26 NOTE — ED NOTES
Attempted to call number for family in pt record. Writer told wrong number.      Jonathan Telles, RN  04/26/22 8824

## 2022-04-26 NOTE — ED PROVIDER NOTES
[address] ED  EMERGENCY DEPARTMENT ENCOUNTER        Pt Name: Ava Loja  MRN: [id number]  [place] 1950  Date of evaluation: 4/26/2022  Provider: ITALO Ramos - CNP  PCP: Megan Trejo MD  ED Attending: Ligia Ríos MD    CHIEF COMPLAINT       Chief Complaint   Patient presents with    Vaginal Bleeding     pt has had new vaginal bleeding for two days. Pt began bleeding from dialysis port today during dialysis. Pt got 30 mins of dialysis. HISTORY OF PRESENT ILLNESS   (Location/Symptom, Timing/Onset, Context/Setting, Quality, Duration, Modifying Factors, Severity)  Note limiting factors. Ava Loja is a 80 y.o. female for bleeding. Onset was today. Context includes patient reports that she started having bleeding from her dialysis fistula today. She also reports that she has been having vaginal bleeding that started today. Patient reports that she is a dialysis patient and was only able to tolerate 30 minutes of her dialysis today because the mattress was uncomfortable. Patient does take Coumadin. Per nursing report the patient had an elevated INR and the nursing home was held her last 4 days worth of Coumadin. Alleviating factors include nothing. Aggravating factors include nothing. Nothing has been used for pain today. Nursing Notes were all reviewed and agreed with or any disagreements were addressed  in the HPI. REVIEW OF SYSTEMS  (2-9 systems for level 4, 10 or more for level 5)     Review of Systems   Constitutional: Negative for activity change, appetite change and fever. Bleeding from dialysis catheter   HENT: Negative for congestion, facial swelling, rhinorrhea and sore throat. Eyes: Negative for visual disturbance. Respiratory: Negative for shortness of breath. Cardiovascular: Negative for chest pain. Gastrointestinal: Negative for abdominal pain. Genitourinary: Positive for vaginal bleeding.  Negative for difficulty urinating. Musculoskeletal: Negative for arthralgias and myalgias. Right ankle pain   Skin: Negative for color change and rash. Neurological: Negative for dizziness and light-headedness. Psychiatric/Behavioral: Negative for agitation. All other systems reviewed and are negative. Positivesand Pertinent negatives as per HPI. Except as noted above in the ROS, all other systems were reviewed and negative. PAST MEDICAL HISTORY     Past Medical History:   Diagnosis Date    Acute and chronic respiratory failure (acute-on-chronic) ([facility])     Acute blood loss anemia 7/1/2020    Acute deep vein thrombosis (DVT) of brachial vein of left upper extremity ([address] 2/20/2019    Formatting of this note might be different from the original. Dx February 2019    Anxiety disorder     Atherosclerotic heart disease of native coronary artery without angina pectoris     Bleeding hemorrhoids 6/29/2020    Cerebellar infarct ([address] 9/29/2019    Cerebral infarction ([address]     Chronic pain syndrome     Dependence on renal dialysis ([address]     Dependence on respirator ([facility])     End stage renal disease ([address]     Gastro-esophageal reflux disease with esophagitis, without bleeding     Insomnia     Major depressive disorder, recurrent ([address]     Morbid obesity due to excess calories ([address]     Muscle weakness     Obstructive sleep apnea (adult) (pediatric)     Other hyperlipidemia     Other voice and resonance disorders     Personal history of COVID-19     Pulmonary hypertension ([address]     Tracheostomy status ([address]     Type 2 diabetes mellitus without complications ([address]     Unspecified atrial fibrillation ([address]          SURGICAL HISTORY     No past surgical history on file.       CURRENT MEDICATIONS       Previous Medications    ACETAMINOPHEN (TYLENOL) 325 MG TABLET    Take 650 mg by mouth every 6 hours as needed for Pain    ASPIRIN 81 MG EC TABLET    Take 81 mg by mouth daily    ATORVASTATIN (LIPITOR) 80 MG TABLET    Take 80 mg by mouth at bedtime    CALCIUM ACETATE 667 MG TABS    Take 1,334 mg by mouth 3 times daily (with meals)    CHLORHEXIDINE (PERIDEX) 0.12 % SOLUTION    Take 15 mLs by mouth 2 times daily    CYANOCOBALAMIN 1000 MCG TABLET    Take 500 mcg by mouth daily    DEXTROMETHORPHAN-GUAIFENESIN (MUCINEX DM)  MG PER EXTENDED RELEASE TABLET    Take 1 tablet by mouth 2 times daily     DIPHENHYDRAMINE (BENADRYL) 25 MG CAPSULE    Take 25 mg by mouth every 8 hours as needed     DOCUSATE SODIUM (COLACE) 100 MG CAPSULE    Take 100 mg by mouth 2 times daily    FUROSEMIDE (LASIX) 20 MG TABLET    Take 20 mg by mouth three times a week MWF    HYDROCORTISONE (ANUSOL-HC) 2.5 % CREA RECTAL CREAM    Place rectally 2 times daily    HYDROCORTISONE (PREPARATION H) 1 % CREAM    Apply topically 2 times daily Apply topically 2 times daily.     INSULIN GLARGINE (LANTUS) 100 UNIT/ML INJECTION VIAL    Inject 15 Units into the skin daily    INSULIN NPH (HUMULIN N;NOVOLIN N) 100 UNIT/ML INJECTION VIAL    Inject into the skin every 6 hours     IPRATROPIUM-ALBUTEROL (DUONEB) 0.5-2.5 (3) MG/3ML SOLN NEBULIZER SOLUTION    Inhale 1 vial into the lungs every 4 hours    LACTULOSE ENCEPHALOPATHY 10 GM/15ML SOLN SOLUTION    Take 20 g by mouth daily as needed     LOPERAMIDE (IMODIUM) 2 MG CAPSULE    Take 2 mg by mouth 4 times daily as needed for Diarrhea    METOPROLOL SUCCINATE (TOPROL XL) 25 MG EXTENDED RELEASE TABLET    Take 0.5 tablets by mouth daily    MIDODRINE (PROAMATINE) 5 MG TABLET    Take 10 mg by mouth three times a week Tues thurs sat pre dialysis    MIDODRINE (PROAMATINE) 5 MG TABLET    Take 10 mg by mouth three times a week Tues Thurs Sat- mid dialysis treatment    OMEPRAZOLE (PRILOSEC) 20 MG DELAYED RELEASE CAPSULE    Take 20 mg by mouth daily    ONDANSETRON (ZOFRAN) 4 MG TABLET    Take 4 mg by mouth every 8 hours as needed for Nausea or Vomiting    POLYETHYLENE GLYCOL (GLYCOLAX) 17 G PACKET    Take 17 g by mouth daily as needed for Constipation    QUETIAPINE (SEROQUEL) 25 MG TABLET    Take 12.5 mg by mouth nightly    SENNOSIDES-DOCUSATE SODIUM (SENOKOT-S) 8.6-50 MG TABLET    Take 2 tablets by mouth at bedtime     SERTRALINE (ZOLOFT) 50 MG TABLET    Take 50 mg by mouth daily    SODIUM POLYSTYRENE (KAYEXALATE) 15 GM/60ML SUSPENSION    Take 15 g by mouth 4 times daily Sun, Mon, Wed, Fri    TERAZOSIN (HYTRIN) 1 MG CAPSULE    Take 1 mg by mouth nightly    WARFARIN (COUMADIN) 6 MG TABLET    Take 6 mg by mouth nightly         ALLERGIES     Haloperidol lactate and Ziprasidone hcl    FAMILY HISTORY     No family history on file. SOCIAL HISTORY       Social History     Socioeconomic History    Marital status:      Spouse name: Not on file    Number of children: Not on file    Years of education: Not on file    Highest education level: Not on file   Occupational History    Not on file   Tobacco Use    Smoking status: Former Smoker    Smokeless tobacco: Never Used   Substance and Sexual Activity    Alcohol use: Never    Drug use: Never    Sexual activity: Not on file   Other Topics Concern    Not on file   Social History Narrative    Not on file     Social Determinants of Health     Financial Resource Strain:     Difficulty of Paying Living Expenses: Not on file   Food Insecurity:     Worried About [address] in the Last Year: Not on file    [address] in the Last Year: Not on file   Transportation Needs:     Lack of Transportation (Medical): Not on file    Lack of Transportation (Non-Medical):  Not on file   Physical Activity:     Days of Exercise per Week: Not on file    Minutes of Exercise per Session: Not on file   Stress:     Feeling of Stress : Not on file   Social Connections:     Frequency of Communication with Friends and Family: Not on file    Frequency of Social Gatherings with Friends and Family: Not on file    Attends Manda[employer] Services: Not on file    Active Member of Clubs or Organizations: Not on file    Attends Club or Organization Meetings: Not on file    Marital Status: Not on file   Intimate Partner Violence:     Fear of Current or Ex-Partner: Not on file    Emotionally Abused: Not on file    Physically Abused: Not on file    Sexually Abused: Not on file   Housing Stability:     Unable to Pay for Housing in the Last Year: Not on file    Number of [place] in the Last Year: Not on file    Unstable Housing in the Last Year: Not on file       SCREENINGS    [place] Coma Scale  Eye Opening: Spontaneous  Best Verbal Response: Oriented  Best Motor Response: Obeys commands  Pau Coma Scale Score: 15        PHYSICAL EXAM    (up to 7 for level 4, 8 ormore for level 5)     ED Triage Vitals [04/26/22 1133]   BP Temp Temp Source Pulse Resp SpO2 Height Weight   (!) 131/53 98 °F (36.7 °C) Oral 71 16 94 % 5' 4\" (1.626 m) 291 lb (132 kg)       Physical Exam  Constitutional:       Appearance: She is well-developed. She is obese. HENT:      Head: Normocephalic and atraumatic. Cardiovascular:      Rate and Rhythm: Normal rate. Pulmonary:      Effort: Pulmonary effort is normal. No respiratory distress. Abdominal:      General: There is no distension. Palpations: Abdomen is soft. Tenderness: There is no abdominal tenderness. Genitourinary:     Comments: Examination of the vagina was concerning for vaginal bleeding however no bimanual or speculum exam was completed  Musculoskeletal:         General: Swelling and signs of injury present. No tenderness or deformity. Cervical back: Normal range of motion. Comments: Patient reports that she is bedbound but is having right ankle pain. Edema and tenderness noted to the right ankle. Sensation and pulse intact to right foot. Skin:     General: Skin is warm and dry. Comments: Dialysis fistula intact to right upper arm. Bloody dressing was removed and there was no bleeding.   Pressure dressing was reapplied. Neurological:      Mental Status: She is alert and oriented to person, place, and time.          DIAGNOSTIC RESULTS   LABS:    Labs Reviewed   CBC WITH AUTO DIFFERENTIAL - Abnormal; Notable for the following components:       Result Value    WBC 19.2 (*)     RBC 2.42 (*)     Hemoglobin 7.1 (*)     Hematocrit 23.2 (*)     MCHC 30.9 (*)     RDW 18.9 (*)     Platelets 758 (*)     Neutrophils Absolute 17.5 (*)     Lymphocytes Absolute 0.8 (*)     All other components within normal limits   BRAIN NATRIURETIC PEPTIDE - Abnormal; Notable for the following components:    Pro-BNP 40,337 (*)     All other components within normal limits    Narrative:     CALL  [employer] tel. [phone],  Chemistry results called to and read back by Estrada Wang RN, 04/26/2022  12:49, by Glennon Severe   COMPREHENSIVE METABOLIC PANEL W/ REFLEX TO MG FOR LOW K - Abnormal; Notable for the following components:    Sodium 134 (*)     Chloride 90 (*)     BUN 68 (*)     CREATININE 6.7 (*)     GFR Non- 6 (*)     GFR  7 (*)     Albumin 2.6 (*)     Albumin/Globulin Ratio 0.5 (*)     ALT 7 (*)     AST 7 (*)     All other components within normal limits    Narrative:     CALL  [employer] tel. [phone],  Chemistry results called to and read back by Estrada Wang RN, 04/26/2022  12:49, by Higinio Regan - Abnormal; Notable for the following components:    Protime 58.6 (*)     INR 4.86 (*)     All other components within normal limits    Narrative:     CALL  [employer] tel. [phone],  Coag results called to and read back by Estrada Wang RN, 04/26/2022 12:17, by  Flory Daniel - Abnormal; Notable for the following components:    Hemoglobin 7.2 (*)     Hematocrit 23.3 (*)     All other components within normal limits   CULTURE, BLOOD 2   CULTURE, BLOOD 1   COVID-19 & INFLUENZA COMBO   SPECIMEN REJECTION   LACTATE, SEPSIS   LACTATE, SEPSIS   TYPE AND SCREEN   ABO/RH       All other labs were within normal range or not returned as of this dictation. EKG: All EKG's are interpreted by the Emergency Department Physician who either signs or Co-signs this chart in the absence of a cardiologist.  Please see their note for interpretation of EKG. RADIOLOGY:   Portable chest x-ray interpreted by radiologist for  FINDINGS:   LINES/TUBES/OTHER: Tracheostomy tube is again noted. HEART/MEDIASTINUM: The cardiac silhouette is enlarged, but stable. PLEURA/LUNGS: There are no focal consolidations or pleural effusions. There   is no appreciable pneumothorax. BONES/SOFT TISSUE: No acute abnormality. Right ankle x-ray interpreted by radiologist for  Impression:    No acute displaced fracture with grossly anatomic alignment however advanced   degenerative changes of the tibiotalar joint space and osteopenia limiting   evaluation for subtle cortical defect             Interpretation per the Radiologist below, if available at the time of this note:    XR CHEST PORTABLE   Final Result   No radiographic evidence of acute pulmonary disease. XR ANKLE RIGHT (MIN 3 VIEWS)   Final Result   No acute displaced fracture with grossly anatomic alignment however advanced   degenerative changes of the tibiotalar joint space and osteopenia limiting   evaluation for subtle cortical defect           No results found.       PROCEDURES   Unless otherwise noted below, none     Procedures    CRITICAL CARE TIME   N/A    CONSULTS:  IP CONSULT TO HOSPITALIST      EMERGENCY DEPARTMENT COURSE and DIFFERENTIAL DIAGNOSIS/MDM:   Vitals:    Vitals:    04/26/22 1245 04/26/22 1300 04/26/22 1330 04/26/22 1530   BP: 135/65 130/67 122/64 122/62   Pulse: 70 72 69 79   Resp: 19 21 14 16   Temp:       TempSrc:       SpO2: 97% 100% 96% 98%   Weight:       Height:           Patient was given the following medications:  Medications   vancomycin 1000 mg IVPB in 250 mL D5W addavial (has no administration in time range)   tranexamic acid repeat H&H, and transfuse if hemoglobin dropping. Repeat hemoglobin was obtained and actually showed improvement. Patient was provided with a tranexamic acid and antibiotics. Transportation has been arranged. Patient's care will be [facility] to [facility] for [facility] evaluation. The patient tolerated their visit well. They were seen and evaluated by the attending physician, Jewel Cagle MD who agreed with the assessment and plan. The patient and / or the family were informed of the results of any tests, a time was given to answer questions, a plan was proposed and they agreed with plan. FINAL IMPRESSION      1. Anticoagulated on Coumadin          DISPOSITION/PLAN   DISPOSITION Decision To Transfer 04/26/2022 12:47:10 PM      PATIENT REFERRED TO:  No follow-up provider specified.     DISCHARGE MEDICATIONS:  New Prescriptions    No medications on file       DISCONTINUED MEDICATIONS:  Discontinued Medications    No medications on file              (Please note that portions of this note were completed with a voice recognition program.  Efforts were made to edit the dictations but occasionally words are mis-transcribed.)    ITALO Perkins CNP (electronically signed)       ITALO Perkins CNP  04/26/22 3755

## 2022-04-26 NOTE — ED NOTES
Attempted to give report to nurse taking over at [facility], nurse not reachable.      Ariadna Peraza, RN  04/26/22 2313

## 2022-04-26 NOTE — H&P
Hospital Medicine History & Physical      PCP: Ben Lemus MD    Date of Admission: 4/26/2022    Date of Service: Pt seen/examined on 4/26/2022    Pt seen/examined face to face on and admitted as inpatient with expected LOS greater than two midnights due to medical therapy    Chief Complaint:    Vaginal bleeding  History Of Present Illness:      80 y.o. female who presented to [facility] with past medical history of end-stage renal disease, GERD, obstructive sleep apnea with trach, atrial fibrillation on Coumadin, type 2 diabetes, pulmonary hypertension, history of CVA, presents to the ED with chief complaint of vaginal bleeding. Patient was transferred from [facility] to [facility] due to no availability of OB/GYN specialist.      Patient is a poor historian and reports she does not know when the bleeding occurred or started. However does report that she has been having bleeding in the fistula while at dialysis today but also has had that been going on for few days and was sent from dialysis to [facility] ED. Meseret Guadalupe Patient in the ED fistula bleeding improved. Also was noted to have vaginal bleeding which the patient reports that no prior history of this. Patient also reports that she does take warfarin and has been therapeutic at the nursing home for the last 4 days 1 week has been supratherapeutic. No known alleviating assessment factor no associated fever chills nausea vomiting chest pain shortness of breath abdominal pain or dysuria. Patient reports that she does have new skin lesions occurring in the groin abdomen in addition does complain of severe right ankle pain with minimal exertion.         Past Medical History:          Diagnosis Date    Acute and chronic respiratory failure (acute-on-chronic) (HCC)     Acute blood loss anemia 7/1/2020    Acute deep vein thrombosis (DVT) of brachial vein of left upper extremity ([facility] [address] 2/20/2019    Formatting of this note might be different from the original. Dx February 2019    Anxiety disorder     Atherosclerotic heart disease of native coronary artery without angina pectoris     Bleeding hemorrhoids 6/29/2020    Cerebellar infarct ([facility] [address] 9/29/2019    Cerebral infarction (HCC)     Chronic pain syndrome     Dependence on renal dialysis ([facility] [address] [facility]     Dependence on respirator (HCC)     End stage renal disease (HCC)     Gastro-esophageal reflux disease with esophagitis, without bleeding     Insomnia     Major depressive disorder, recurrent ([facility] [address]     Morbid obesity due to excess calories ([address]     Muscle weakness     Obstructive sleep apnea (adult) (pediatric)     Other hyperlipidemia     Other voice and resonance disorders     Personal history of COVID-19     Pulmonary hypertension ([facility] [address]     Tracheostomy status ([facility] [address]     Type 2 diabetes mellitus without complications ([facility] [address]     Unspecified atrial fibrillation ([facility] [address]        Past Surgical History:    Tracheostomy tube placement  No past surgical history on file. Medications Prior to Admission:      Prior to Admission medications    Medication Sig Start Date End Date Taking?  Authorizing Provider   hydrocortisone (ANUSOL-HC) 2.5 % CREA rectal cream Place rectally 2 times daily 3/3/22   Yuriy Hernandez DO   metoprolol succinate (TOPROL XL) 25 MG extended release tablet Take 0.5 tablets by mouth daily 2/22/22   Idris Peters MD   insulin glargine (LANTUS) 100 UNIT/ML injection vial Inject 15 Units into the skin daily    Historical Provider, MD   warfarin (COUMADIN) 6 MG tablet Take 6 mg by mouth nightly 10/10/21   Historical Provider, MD   aspirin 81 MG EC tablet Take 81 mg by mouth daily    Historical Provider, MD   midodrine (PROAMATINE) 5 MG tablet Take 10 mg by mouth three times a week Tues Thurs Sat- mid dialysis treatment    Historical Provider, MD   cyanocobalamin 1000 MCG tablet Take 500 mcg by mouth daily    Historical Provider, MD   QUEtiapine (SEROQUEL) 25 MG tablet Take 12.5 mg by mouth nightly    Historical Provider, MD   sodium polystyrene (KAYEXALATE) 15 GM/60ML suspension Take 15 g by mouth 4 times daily Sun, Mon, Wed, Fri    Historical Provider, MD   calcium acetate 667 MG TABS Take 1,334 mg by mouth 3 times daily (with meals)    Historical Provider, MD   hydrocortisone (PREPARATION H) 1 % cream Apply topically 2 times daily Apply topically 2 times daily.     Historical Provider, MD   acetaminophen (TYLENOL) 325 MG tablet Take 650 mg by mouth every 6 hours as needed for Pain    Historical Provider, MD   atorvastatin (LIPITOR) 80 MG tablet Take 80 mg by mouth at bedtime    Historical Provider, MD   diphenhydrAMINE (BENADRYL) 25 MG capsule Take 25 mg by mouth every 8 hours as needed     Historical Provider, MD   docusate sodium (COLACE) 100 MG capsule Take 100 mg by mouth 2 times daily    Historical Provider, MD   lactulose encephalopathy 10 GM/15ML SOLN solution Take 20 g by mouth daily as needed     Historical Provider, MD   polyethylene glycol (GLYCOLAX) 17 g packet Take 17 g by mouth daily as needed for Constipation    Historical Provider, MD   insulin NPH (HUMULIN N;NOVOLIN N) 100 UNIT/ML injection vial Inject into the skin every 6 hours     Historical Provider, MD   ipratropium-albuterol (DUONEB) 0.5-2.5 (3) MG/3ML SOLN nebulizer solution Inhale 1 vial into the lungs every 4 hours    Historical Provider, MD   furosemide (LASIX) 20 MG tablet Take 20 mg by mouth three times a week MWF    Historical Provider, MD   loperamide (IMODIUM) 2 MG capsule Take 2 mg by mouth 4 times daily as needed for Diarrhea    Historical Provider, MD   midodrine (PROAMATINE) 5 MG tablet Take 10 mg by mouth three times a week Tues thurs sat pre dialysis    Historical Provider, MD   dextromethorphan-guaiFENesin (MUCINEX DM)  MG per extended release tablet Take 1 tablet by mouth 2 times daily     Historical Provider, MD   omeprazole (PRILOSEC) 20 MG delayed release capsule Take 20 mg by mouth daily    Historical Provider, MD   chlorhexidine (PERIDEX) 0.12 % solution Take 15 mLs by mouth 2 times daily    Historical Provider, MD   sennosides-docusate sodium (SENOKOT-S) 8.6-50 MG tablet Take 2 tablets by mouth at bedtime     Historical Provider, MD   sertraline (ZOLOFT) 50 MG tablet Take 50 mg by mouth daily    Historical Provider, MD   terazosin (HYTRIN) 1 MG capsule Take 1 mg by mouth nightly    Historical Provider, MD   ondansetron (ZOFRAN) 4 MG tablet Take 4 mg by mouth every 8 hours as needed for Nausea or Vomiting    Historical Provider, MD       Allergies:  Haloperidol lactate and Ziprasidone hcl    Social History:          TOBACCO:   reports that she has quit smoking. She has never used smokeless tobacco.  ETOH:   reports no history of alcohol use. E-Cigarettes/Vaping Use     Questions Responses    E-Cigarette/Vaping Use     Start Date     Passive Exposure     Quit Date     Counseling Given     Comments             Family History:      Reviewed in detail, and positive for cardiomyopathy maternally      REVIEW OF SYSTEMS:     Constitutional:  No Fever, No Chills, No Night Sweats  ENT/Mouth:  No Nasal Congestion,  No Hoarseness, No new mouth lesion  Eyes:  No Eye Pain, No Redness, No Discharge  Cardiovascular:  No Chest Pain, No Orthopnea, No Palpitations  Respiratory:  No Cough, No Sputum, No Dyspnea  Gastrointestinal: No Vomiting, No Diarrhea, No abdominal pain  Genitourinary: No Urinary Frequency, No Hematuria, No Urinary pain  Musculoskeletal:  No worsening Arthralgias, No worsening Myalgias  Skin: + New Skin Lesions, + new skin rash  Neuro:  No new weakness, No new numbness. Psych:  No suicial ideation, No Violence ideation    PHYSICAL EXAM PERFORMED:  Vital signs /62, RR 16, heart rate 79, sat 98% on room air  There were no vitals taken for this visit.     General appearance:  mild acute distress, appears older than stated age  [de-identified]:   atraumatic, sclera anicteric, Conjunctivae clear.  Neck: Supple,Trachea midline, no goiter, trach present   Respiratory:minimal accessory muscle usage, Normal respiratory effort. Clear to auscultation, bilaterally without wheezing  Cardiovascular:  Regular rate and rhythm, capillary refill 2 seconds  Abdomen: Soft, non-tender, non-distended with normal bowel sounds. Rectal: Digital rectal exam with no evidence of active rectal bleeding, sent Hemoccult  : Noted vaginal bleeding occurring from the vagina  Musculoskeletal:  No clubbing, cyanosis. trace edema LE bilaterally. Severe right ankle pain with edema   skin: turgor normal.  + New rashes or lesions. Fistula present minimal bleeding. Neurologic: Alert and oriented x4, no new focal sensory/motor deficits. Labs:     Recent Labs     04/26/22  1219 04/26/22  1536   WBC 19.2*  --    HGB 7.1* 7.2*   HCT 23.2* 23.3*   *  --      Recent Labs     04/26/22  1219   *   K 4.1   CL 90*   CO2 30   BUN 68*   CREATININE 6.7*   CALCIUM 9.5     Recent Labs     04/26/22  1219   AST 7*   ALT 7*   BILITOT 0.4   ALKPHOS 107     Recent Labs     04/26/22  1158   INR 4.86*     No results for input(s): [place] in the last 72 hours.     Urinalysis:    No results found for: Mere Juares, BACTERIA, [address] [place] [address], [place] [address]    Radiology:     CXR: I have reviewed the CXR with the following interpretation:   No acute process    XR ANKLE RIGHT (MIN 3 VIEWS)    (Results Pending)     EKG: Pending    ASSESSMENT AND PLAN:    Active Hospital Problems    Diagnosis Date Noted    Vaginal bleeding [N93.9] 04/26/2022     Priority: Medium     Acute vaginal bleeding:  Patient does have history of leiomyomas in the setting of supratherapeutic INR,  Anemia panel ordered  Reversal of warfarin given acute bleeding  OB/GYN consulted, much appreciated    Suspected sepsis prior to arrival leukocytosis, HR>90  Etiology unclear at this time  Blood cultures obtained, negative chest x-ray, UA pending  IV Vanco and ceftriaxone empirically    End-stage renal disease: Nephrology consulted, much appreciated    Right ankle pain: X-ray pending    Chronic medical conditions:  Type 2 Diabetes: Insulin sliding scale  Atrial fibrillation: Elevated [id number],  Once bleeding subsidize will need to restart anticoagulation as early as possible given elevated [id number]  SILVER: s/p trach  Anxiety: Continue home medication  GERD: Continue home medication  Hyperlipidemia: Continue home medication    Diet: Regular diet    DVT Prophylaxis: Held    Dispo:   Expected LOS greater than two [place], DO

## 2022-04-26 NOTE — ED NOTES
[address] Ronal Lambert RN) start new case, [facility] w/gyn access  Dx: Hypercoagulated - vag bleeding - will need dialysis also      Dilma Ribeiro  04/26/22 1315    1316 - Dr. Harper Martinez called back via [address]  04/26/22 1321    1335 - AC called said [facility] is D/c dependant and probably no bed today. Aura Lopez 238 NP about what [address] called and advised. She said to call back and try anywhere in [facility] for bed.  [address] called back and said she spoke with [place] at [facility] and said they don't have bed right this minute but should today. Dilma Ribeiro  04/26/22 141    1422 - AC called with Dr. Polly Rivas at [facility], [address] have GYN there per Dr. Sean London  [phone] - AC called back again spoke with Filiberto Hillman. NP and she told them to look outside [facility] then.         Dilma Ribeiro  04/26/22 1440    1500 - AC called with Dr. Jennifer Hill @ [facility]  04/26/22 1500 [facility] - Bed assignment & transport   [address]  [facility]  at [facility]  04/26/22 [facility]  04/26/22 3149    [phone] - [facility] here for transport to [facility]  04/26/22 Emelia  81.

## 2022-04-27 ENCOUNTER — APPOINTMENT (OUTPATIENT)
Dept: ULTRASOUND IMAGING | Age: 72
DRG: 813 | End: 2022-04-27
Attending: INTERNAL MEDICINE
Payer: COMMERCIAL

## 2022-04-27 LAB
A/G RATIO: 0.7 (ref 1.1–2.2)
ALBUMIN SERPL-MCNC: 2.7 G/DL (ref 3.4–5)
ALP BLD-CCNC: 101 U/L (ref 40–129)
ALT SERPL-CCNC: 6 U/L (ref 10–40)
ANION GAP SERPL CALCULATED.3IONS-SCNC: 19 MMOL/L (ref 3–16)
APTT: 34.2 SEC (ref 26.2–38.6)
AST SERPL-CCNC: 8 U/L (ref 15–37)
BILIRUB SERPL-MCNC: 0.3 MG/DL (ref 0–1)
BUN BLDV-MCNC: 81 MG/DL (ref 7–20)
CALCIUM SERPL-MCNC: 8.7 MG/DL (ref 8.3–10.6)
CHLORIDE BLD-SCNC: 89 MMOL/L (ref 99–110)
CO2: 24 MMOL/L (ref 21–32)
CREAT SERPL-MCNC: 7.6 MG/DL (ref 0.6–1.2)
EKG ATRIAL RATE: 74 BPM
EKG DIAGNOSIS: NORMAL
EKG P AXIS: 56 DEGREES
EKG P-R INTERVAL: 222 MS
EKG Q-T INTERVAL: 414 MS
EKG QRS DURATION: 84 MS
EKG QTC CALCULATION (BAZETT): 459 MS
EKG R AXIS: -6 DEGREES
EKG T AXIS: 25 DEGREES
EKG VENTRICULAR RATE: 74 BPM
FERRITIN: 2153 NG/ML (ref 15–150)
FOLATE: 8.99 NG/ML (ref 4.78–24.2)
GFR AFRICAN AMERICAN: 6
GFR NON-AFRICAN AMERICAN: 5
GLUCOSE BLD-MCNC: 100 MG/DL (ref 70–99)
GLUCOSE BLD-MCNC: 102 MG/DL (ref 70–99)
GLUCOSE BLD-MCNC: 106 MG/DL (ref 70–99)
GLUCOSE BLD-MCNC: 115 MG/DL (ref 70–99)
GLUCOSE BLD-MCNC: 87 MG/DL (ref 70–99)
GLUCOSE BLD-MCNC: 89 MG/DL (ref 70–99)
HCT VFR BLD CALC: 22.5 % (ref 36–48)
HEMOGLOBIN: 7 G/DL (ref 12–16)
INR BLD: 1.37 (ref 0.88–1.12)
IRON SATURATION: 37 % (ref 15–50)
IRON: 48 UG/DL (ref 37–145)
PERFORMED ON: ABNORMAL
PERFORMED ON: NORMAL
POTASSIUM REFLEX MAGNESIUM: 4.2 MMOL/L (ref 3.5–5.1)
PROTHROMBIN TIME: 15.7 SEC (ref 9.9–12.7)
REPORT: NORMAL
SODIUM BLD-SCNC: 132 MMOL/L (ref 136–145)
TOTAL IRON BINDING CAPACITY: 129 UG/DL (ref 260–445)
TOTAL PROTEIN: 6.4 G/DL (ref 6.4–8.2)
VANCOMYCIN RANDOM: 19.4 UG/ML
VITAMIN B-12: >2000 PG/ML (ref 211–911)

## 2022-04-27 PROCEDURE — 6370000000 HC RX 637 (ALT 250 FOR IP): Performed by: INTERNAL MEDICINE

## 2022-04-27 PROCEDURE — 2580000003 HC RX 258: Performed by: INTERNAL MEDICINE

## 2022-04-27 PROCEDURE — 1200000000 HC SEMI PRIVATE

## 2022-04-27 PROCEDURE — 36415 COLL VENOUS BLD VENIPUNCTURE: CPT

## 2022-04-27 PROCEDURE — 2700000000 HC OXYGEN THERAPY PER DAY

## 2022-04-27 PROCEDURE — 94761 N-INVAS EAR/PLS OXIMETRY MLT: CPT

## 2022-04-27 PROCEDURE — 85610 PROTHROMBIN TIME: CPT

## 2022-04-27 PROCEDURE — 85018 HEMOGLOBIN: CPT

## 2022-04-27 PROCEDURE — 83036 HEMOGLOBIN GLYCOSYLATED A1C: CPT

## 2022-04-27 PROCEDURE — 80202 ASSAY OF VANCOMYCIN: CPT

## 2022-04-27 PROCEDURE — 6360000002 HC RX W HCPCS: Performed by: NURSE PRACTITIONER

## 2022-04-27 PROCEDURE — 85730 THROMBOPLASTIN TIME PARTIAL: CPT

## 2022-04-27 PROCEDURE — 6360000002 HC RX W HCPCS: Performed by: INTERNAL MEDICINE

## 2022-04-27 PROCEDURE — 94640 AIRWAY INHALATION TREATMENT: CPT

## 2022-04-27 PROCEDURE — 76856 US EXAM PELVIC COMPLETE: CPT

## 2022-04-27 PROCEDURE — 85014 HEMATOCRIT: CPT

## 2022-04-27 PROCEDURE — 93010 ELECTROCARDIOGRAM REPORT: CPT | Performed by: INTERNAL MEDICINE

## 2022-04-27 PROCEDURE — 80053 COMPREHEN METABOLIC PANEL: CPT

## 2022-04-27 RX ORDER — MORPHINE SULFATE 2 MG/ML
2 INJECTION, SOLUTION INTRAMUSCULAR; INTRAVENOUS
Status: DISCONTINUED | OUTPATIENT
Start: 2022-04-27 | End: 2022-05-01 | Stop reason: HOSPADM

## 2022-04-27 RX ADMIN — DOCUSATE SODIUM 100 MG: 100 CAPSULE, LIQUID FILLED ORAL at 22:01

## 2022-04-27 RX ADMIN — PANTOPRAZOLE SODIUM 40 MG: 40 TABLET, DELAYED RELEASE ORAL at 06:36

## 2022-04-27 RX ADMIN — IPRATROPIUM BROMIDE AND ALBUTEROL SULFATE 3 ML: .5; 3 SOLUTION RESPIRATORY (INHALATION) at 09:05

## 2022-04-27 RX ADMIN — Medication 10 ML: at 21:47

## 2022-04-27 RX ADMIN — GUAIFENESIN AND DEXTROMETHORPHAN HYDROBROMIDE 1 TABLET: 600; 30 TABLET, EXTENDED RELEASE ORAL at 10:23

## 2022-04-27 RX ADMIN — Medication 1000 MG: at 17:29

## 2022-04-27 RX ADMIN — VANCOMYCIN HYDROCHLORIDE 1250 MG: 10 INJECTION, POWDER, LYOPHILIZED, FOR SOLUTION INTRAVENOUS at 10:16

## 2022-04-27 RX ADMIN — GUAIFENESIN AND DEXTROMETHORPHAN HYDROBROMIDE 1 TABLET: 600; 30 TABLET, EXTENDED RELEASE ORAL at 22:01

## 2022-04-27 RX ADMIN — MORPHINE SULFATE 2 MG: 2 INJECTION, SOLUTION INTRAMUSCULAR; INTRAVENOUS at 17:35

## 2022-04-27 RX ADMIN — IPRATROPIUM BROMIDE AND ALBUTEROL SULFATE 3 ML: .5; 3 SOLUTION RESPIRATORY (INHALATION) at 12:08

## 2022-04-27 RX ADMIN — MORPHINE SULFATE 2 MG: 2 INJECTION, SOLUTION INTRAMUSCULAR; INTRAVENOUS at 12:20

## 2022-04-27 RX ADMIN — SERTRALINE 50 MG: 50 TABLET, FILM COATED ORAL at 10:16

## 2022-04-27 RX ADMIN — ATORVASTATIN CALCIUM 80 MG: 80 TABLET, FILM COATED ORAL at 22:01

## 2022-04-27 RX ADMIN — INSULIN GLARGINE 10 UNITS: 100 INJECTION, SOLUTION SUBCUTANEOUS at 10:24

## 2022-04-27 RX ADMIN — IPRATROPIUM BROMIDE AND ALBUTEROL SULFATE 3 ML: .5; 3 SOLUTION RESPIRATORY (INHALATION) at 16:12

## 2022-04-27 RX ADMIN — QUETIAPINE FUMARATE 12.5 MG: 25 TABLET ORAL at 22:01

## 2022-04-27 RX ADMIN — IPRATROPIUM BROMIDE AND ALBUTEROL SULFATE 3 ML: .5; 3 SOLUTION RESPIRATORY (INHALATION) at 20:00

## 2022-04-27 ASSESSMENT — PAIN SCALES - GENERAL
PAINLEVEL_OUTOF10: 8
PAINLEVEL_OUTOF10: 9
PAINLEVEL_OUTOF10: 5
PAINLEVEL_OUTOF10: 10
PAINLEVEL_OUTOF10: 10

## 2022-04-27 ASSESSMENT — PAIN DESCRIPTION - LOCATION
LOCATION: FOOT
LOCATION: FOOT

## 2022-04-27 ASSESSMENT — PAIN DESCRIPTION - DESCRIPTORS
DESCRIPTORS: ACHING
DESCRIPTORS: ACHING

## 2022-04-27 ASSESSMENT — PAIN DESCRIPTION - ORIENTATION
ORIENTATION: RIGHT
ORIENTATION: MID
ORIENTATION: LEFT

## 2022-04-27 NOTE — PROGRESS NOTES
[facility] PROGRESS NOTE    4/27/2022 8:02 AM        Name: Hayden Vallecillo . Admitted: 4/26/2022  Primary Care Provider: Ervin Couch MD (Tel: [phone])      Subjective: Gasper Ramirez Pt seen this am  She reports right ankle pain which is chronic. She is a poor historian    She is a long term resident at [facility] with hx of ESRD on HD q T Thurs Sat  Prior stroke ,  Has not walked in [place]"     On long term AC with coumadin.     Sought care due to vaginal bleeding     + trach     Reviewed interval ancillary notes    Current Medications  sodium chloride flush 0.9 % injection 10 mL, 2 times per day  sodium chloride flush 0.9 % injection 10 mL, PRN  0.9 % sodium chloride infusion, PRN  promethazine (PHENERGAN) tablet 12.5 mg, Q6H PRN   Or  ondansetron (ZOFRAN) injection 4 mg, Q6H PRN  acetaminophen (TYLENOL) tablet 650 mg, Q6H PRN   Or  acetaminophen (TYLENOL) suppository 650 mg, Q6H PRN  cefTRIAXone (ROCEPHIN) 1000 mg in sterile water 10 mL IV syringe, Q24H  atorvastatin (LIPITOR) tablet 80 mg, Nightly  dextromethorphan-guaiFENesin (MUCINEX DM)  MG per extended release tablet 1 tablet, BID  docusate sodium (COLACE) capsule 100 mg, BID  insulin glargine (LANTUS) injection vial 10 Units, Daily  QUEtiapine (SEROQUEL) tablet 12.5 mg, Nightly  sertraline (ZOLOFT) tablet 50 mg, Daily  pantoprazole (PROTONIX) tablet 40 mg, QAM AC  glucose (GLUTOSE) 40 % oral gel 15 g, PRN  glucagon (rDNA) injection 1 mg, PRN  dextrose 5 % solution, PRN  insulin lispro (HUMALOG) injection vial 0-6 Units, TID WC  insulin lispro (HUMALOG) injection vial 0-3 Units, Nightly  labetalol (NORMODYNE;TRANDATE) injection 10 mg, Q4H PRN  dextrose bolus (hypoglycemia) 10% 125 mL, PRN   Or  dextrose bolus (hypoglycemia) 10% 250 mL, PRN  vancomycin (VANCOCIN) intermittent dosing (placeholder), RX Placeholder  ipratropium-albuterol (DUONEB) nebulizer solution 1 ampule, Q4H PRN  ipratropium-albuterol (DUONEB) nebulizer solution 3 mL, Q4H WA        Objective:  /62   Pulse 77   Temp 97.5 °F (36.4 °C) (Oral)   Resp 20   Ht 5' 4\" (1.626 m)   Wt 297 lb (134.7 kg)   SpO2 99%   BMI 50.98 kg/m²   No intake or output data in the 24 hours ending 04/27/22 0802   Wt Readings from Last 3 Encounters:   04/27/22 297 lb (134.7 kg)   04/26/22 291 lb (132 kg)   02/21/22 291 lb 11.2 oz (132.3 kg)       General appearance:  Appears chronically ill and obese  Eyes: Sclera clear. Pupils equal.  ENT: Moist oral mucosa. +  Trach  Cardiovascular: Regular rhythm, normal S1, S2. No murmur. No edema in lower extremities  Respiratory: Not using accessory muscles. Good inspiratory effort. Clear to auscultation bilaterally, no wheeze or crackles. GI: Abdomen soft and obese, no tenderness, not distended, normal bowel sounds  Musculoskeletal: No cyanosis in digits, neck supple  Neurology: speech clear ,  Unable to move right leg which appears to be contracted. Moves bilat upper arms with no deficits . Power in left leg is 2/5  Psych: anxious  Affect at intervals re oriented to surroundings . Skin: Warm, dry, normal turgor    Labs and Tests:  CBC:   Recent Labs     04/26/22 1219 04/26/22  1219 04/26/22  1536 04/26/22  2146 04/27/22  0524   WBC 19.2*  --   --   --   --    HGB 7.1*   < > 7.2* 7.0* 7.0*   *  --   --   --   --     < > = values in this interval not displayed. BMP:    Recent Labs     04/26/22 1219 04/27/22  0524   * 132*   K 4.1 4.2   CL 90* 89*   CO2 30 24   BUN 68* 81*   CREATININE 6.7* 7.6*   GLUCOSE 94 87     Hepatic:   Recent Labs     04/26/22 1219 04/27/22  0524   AST 7* 8*   ALT 7* 6*   BILITOT 0.4 0.3   ALKPHOS 107 101   Results for Carolina Caraballo (MRN [id number]) as of 4/27/2022 08:03   Ref.  Range 4/26/2022 22:40 4/27/2022 07:34   POC Glucose Latest Ref Range: 70 - 99 mg/dl 108 (H) 89     No radiographic evidence of acute pulmonary disease. INR 1.3     FINDINGS:   There is no evidence of acute fracture.  There is normal alignment of the   tarsometatarsal joints.  No acute joint abnormality.  No focal osseous   lesion. No focal soft tissue abnormality.  Diffuse osteopenia. Problem List  Principal Problem:    Vaginal bleeding  Resolved Problems:    * No resolved hospital problems. *       Assessment & Plan:   1. Vaginal bleeding:  Now resolved. Likely due to elevated INR, which is now at 1.3. She has been evaluated by GYN and has refused exam.  Pelvic US ordered  2. Possible sepsis:  On ceftriaxone and vanc. BC pending. Source currently unknown. Recheck labs in am.  WBC 19 upon admission  3. Acute on Chronic anemia :  Likely due to vaginal bleeding and anemia of chronic disease:  Hgb was 8.9 in February 2022 , will follow   4. AF:  Rate controlled. Likely resume coumadin in the am if no further bleeding  5. Previous stroke:  Pt is long term resident at [facility]  6. ESRD: on HD q Tue, Thurs, Sat, nephrology is managing   7. SILVER/ previous trach  8. Likely ready to return back to [facility] in the next 24 hours if hgb is stable       Diet: ADULT DIET;  Regular; 4 carb choices (60 gm/meal)  Code:Full Code  DVT PPX      ITALO Bailey - CNP   4/27/2022 8:02 AM

## 2022-04-27 NOTE — PROGRESS NOTES
Admission assessment completed, pt is alert and oriented, VSS, fall precautions in place, call light within reach, pt requests for trach suction, RT notified. Pt is aware of NPO status after midnight, see flowsheets and MAR, will monitor pt. The care plan and education has been reviewed and mutually agreed upon with the patient. - Perfect serve to Dr. Ramona Duncan  \"Patient doesn't remember her Nephrologist, which Nephrologist do you want to consult? Thank you.    - No response from MD regarding Nephro consult, message paged on to [facility].           \"

## 2022-04-27 NOTE — PROGRESS NOTES
[address]  Patient has size 6 Shiley XLT. Trach Kit of same size on standby  Yes, Obturator at head of the bed  Yes. Inner cannula cleaned/replaced Yes, drain sponge changed  Yes, Trach tie replaced is soiled Yes, Flange cleaned  Yes. [address] performed without complications.

## 2022-04-27 NOTE — PROGRESS NOTES
Patient has been inappropriately calling all shift. Patient has been bathed twice today, linens changed multiple times also check and changed multiple times as well. Patient has been complaining of staff not coming right away when she hits the call light system. All of the patient's call and needs have been addressed all shift. Both PCA and RN have been in room all day. All the patients cares and needs have all been addressed at this time.

## 2022-04-27 NOTE — PROGRESS NOTES
Clinical Pharmacy Note: Pharmacy to Dose Vancomycin    Abilio Wilson is a 80 y.o. female started on Vancomycin for sepsis of unknown etiology; consult received from Dr. Kai Thompson to manage therapy. Also receiving the following antibiotics: ceftriaxone. Goal Trough Level: 10-20 mcg/mL    Assessment/Plan:  Patient is ESRD on HD, plan for vancomycin intermittent dosing  Initiate vancomycin 1750 mg IV once today. Patient just admitted, so unclear what HD plans will be. A vancomycin random level has been ordered for tomorrow morning  Changes in regimen will be determined based on culture results, renal function, and clinical response. Pharmacy will continue to monitor and adjust regimen as necessary. Allergies:  Haloperidol lactate and Ziprasidone hcl     Recent Labs     04/26/22  1219   CREATININE 6.7*       Recent Labs     04/26/22  1219   WBC 19.2*       Ht Readings from Last 1 Encounters:   04/26/22 5' 4\" (1.626 m)        Wt Readings from Last 1 Encounters:   04/26/22 291 lb (132 kg)         Estimated Creatinine Clearance: 11 mL/min (A) (based on SCr of 6.7 mg/dL [facility])).       Thank you for the consult,    Luisyo Guardado PharmD  4/26/2022 8:33 PM

## 2022-04-27 NOTE — PROGRESS NOTES
04/26/22 2130   RT Protocol   History Pulmonary Disease 2   Respiratory pattern 2   Breath sounds 6   Cough 1   Bronchodilator Assessment Score 11       Home regimen Per patient q4wa duoneb with q4prn Duoneb

## 2022-04-27 NOTE — ED PROVIDER NOTES
ED Attending Attestation Note    This patient was seen by the advanced practice provider. I personally saw the patient and performed a substantive portion of the visit including all aspects of the medical decision making. Briefly, 80 y.o. female presents with vaginal bleeding, as well as bleeding from her fistula. Hx ESRD on HD and also on coumadin. Focused exam:   Gen: 80 y.o. female, NAD. Morbidly obese. GCS 15. Clear speech without respiratory distress. No active bleeding at this time from patient's dialysis access. Please see NP Edin Orta' note for  exam.      IMaging:  XR ANKLE RIGHT (MIN 3 VIEWS)    Result Date: 4/26/2022  EXAMINATION: THREE XRAY VIEWS OF THE RIGHT ANKLE 4/26/2022 1:07 pm COMPARISON: None. HISTORY: ORDERING SYSTEM PROVIDED HISTORY: swelling TECHNOLOGIST PROVIDED HISTORY: Reason for exam:->swelling Reason for Exam: ankle pain, swelling FINDINGS: AP lateral and lateral oblique views of the right ankle reveal diffuse osteopenia without acute displaced fracture however degenerative changes at the tibiotalar joint space and within the midfoot however no acute displaced fracture     No acute displaced fracture with grossly anatomic alignment however advanced degenerative changes of the tibiotalar joint space and osteopenia limiting evaluation for subtle cortical defect     XR CHEST PORTABLE    Result Date: 4/26/2022  EXAMINATION: ONE XRAY VIEW OF THE CHEST 4/26/2022 3:15 pm COMPARISON: Chest x-ray dated 02/21/2022. HISTORY: ORDERING SYSTEM PROVIDED HISTORY: wbc TECHNOLOGIST PROVIDED HISTORY: Reason for exam:->wbc Reason for Exam: elevated white blood count FINDINGS: LINES/TUBES/OTHER: Tracheostomy tube is again noted. HEART/MEDIASTINUM: The cardiac silhouette is enlarged, but stable. PLEURA/LUNGS: There are no focal consolidations or pleural effusions. There is no appreciable pneumothorax. BONES/SOFT TISSUE: No acute abnormality.      No radiographic evidence of acute pulmonary disease. MDM:   Pt with supratherapeutic INR. Also with vaginal bleeding. Reversed initially with vitamin K followed by TXA. Will plan for adm.  + leukocytosis, no definite source noted. Broad spectrum abx administered. No GYN coverage at [facility] and other [facility] hospitals at [facility], therefore hospitalist at [facility] was consulted and has accepted the patient for transfer as a direct admission. For further details of the patient's emergency department visit, please see the advanced practice provider's documentation. Rakan Glynn MD     This report has been produced using speech recognition software and may contain errors related to that system including errors in grammar, punctuation, and spelling, as well as words and phrases that may be inappropriate. If there are any questions or concerns please feel free to contact the dictating provider for clarification.         Rakan Glynn MD  04/26/22 8377

## 2022-04-27 NOTE — PLAN OF CARE
Problem: Chronic Conditions and Co-morbidities  Goal: Patient's chronic conditions and co-morbidity symptoms are monitored and maintained or improved  Outcome: Progressing     Problem: Discharge Planning  Goal: Discharge to home or other facility with appropriate resources  Outcome: Progressing     Problem: Pain  Goal: Verbalizes/displays adequate comfort level or baseline comfort level  Outcome: Progressing     Problem: Skin/Tissue Integrity  Goal: Absence of new skin breakdown  Description: 1. Monitor for areas of redness and/or skin breakdown  2. Assess vascular access sites hourly  3. Every 4-6 hours minimum:  Change oxygen saturation probe site  4. Every 4-6 hours:  If on nasal continuous positive airway pressure, respiratory therapy assess nares and determine need for appliance change or resting period.   Outcome: Progressing

## 2022-04-27 NOTE — CONSULTS
[facility]   Nephrology progress Note  [phone]   [email or web address]           Reason for Consult:  ESRD on HD      HISTORY OF PRESENT ILLNESS:      The patient is a 80 y.o. female with significant past medical history of ESRD on maintenance hemodialysis Tuesday Thursday Saturday at [facility], obstructive sleep apnea with a tracheostomy, atrial fibrillation, type 2 diabetes mellitus, pulmonary hypertension, CVA, GERD, presented to the ED in [facility] with vaginal bleeding of unknown duration.   She says she has been on hemodialysis for about 5 years and the and the main reason her kidneys failed was diabetes mellitus type 2  I asked her about foul discharge per vagina she says negative, she says the bleeding is more or less stopped  There is no fever          Past Medical History:        Diagnosis Date    Acute and chronic respiratory failure (acute-on-chronic) ([address]     Acute blood loss anemia 7/1/2020    Acute deep vein thrombosis (DVT) of brachial vein of left upper extremity ([address] 2/20/2019    Formatting of this note might be different from the original. Dx February 2019    Anxiety disorder     Atherosclerotic heart disease of native coronary artery without angina pectoris     Bleeding hemorrhoids 6/29/2020    Cerebellar infarct ([address] 9/29/2019    Cerebral infarction ([address]     Chronic pain syndrome     Dependence on renal dialysis ([address]     Dependence on respirator (HCC)     End stage renal disease ([address]     Gastro-esophageal reflux disease with esophagitis, without bleeding     Insomnia     Major depressive disorder, recurrent ([address]     Morbid obesity due to excess calories ([address]     Muscle weakness     Obstructive sleep apnea (adult) (pediatric)     Other hyperlipidemia     Other voice and resonance disorders     Personal history of COVID-19     Pulmonary hypertension ([address]     Tracheostomy status ([address]     Type 2 diabetes mellitus without complications ([address]     Unspecified atrial fibrillation [facility])        Past Surgical History:    No past surgical history on file. Current Medications:    No current facility-administered medications on file prior to encounter. Current Outpatient Medications on File Prior to Encounter   Medication Sig Dispense Refill    HYDROcodone-acetaminophen (NORCO)  MG per tablet Take 1 tablet by mouth 2 times daily.  hydrocortisone (ANUSOL-HC) 2.5 % CREA rectal cream Place rectally 2 times daily 1 each 0    metoprolol succinate (TOPROL XL) 25 MG extended release tablet Take 0.5 tablets by mouth daily 30 tablet 3    insulin glargine (LANTUS) 100 UNIT/ML injection vial Inject 15 Units into the skin daily      warfarin (COUMADIN) 6 MG tablet Take 6 mg by mouth nightly      aspirin 81 MG EC tablet Take 81 mg by mouth daily      midodrine (PROAMATINE) 5 MG tablet Take 10 mg by mouth three times a week Tues Thurs Sat- mid dialysis treatment      cyanocobalamin 1000 MCG tablet Take 500 mcg by mouth daily      QUEtiapine (SEROQUEL) 25 MG tablet Take 12.5 mg by mouth nightly      sodium polystyrene (KAYEXALATE) 15 GM/60ML suspension Take 15 g by mouth 4 times daily Sun, Mon, Wed, Fri      calcium acetate 667 MG TABS Take 1,334 mg by mouth 3 times daily (with meals)      hydrocortisone (PREPARATION H) 1 % cream Apply topically 2 times daily Apply topically 2 times daily.       acetaminophen (TYLENOL) 325 MG tablet Take 650 mg by mouth every 6 hours as needed for Pain      atorvastatin (LIPITOR) 80 MG tablet Take 80 mg by mouth at bedtime      diphenhydrAMINE (BENADRYL) 25 MG capsule Take 25 mg by mouth every 8 hours as needed       docusate sodium (COLACE) 100 MG capsule Take 100 mg by mouth 2 times daily      lactulose encephalopathy 10 GM/15ML SOLN solution Take 20 g by mouth daily as needed       polyethylene glycol (GLYCOLAX) 17 g packet Take 17 g by mouth daily as needed for Constipation      insulin NPH (HUMULIN N;NOVOLIN N) 100 UNIT/ML injection vial Inject into the skin every 6 hours       ipratropium-albuterol (DUONEB) 0.5-2.5 (3) MG/3ML SOLN nebulizer solution Inhale 1 vial into the lungs every 4 hours      furosemide (LASIX) 20 MG tablet Take 20 mg by mouth three times a week MWF      loperamide (IMODIUM) 2 MG capsule Take 2 mg by mouth 4 times daily as needed for Diarrhea      midodrine (PROAMATINE) 5 MG tablet Take 10 mg by mouth three times a week Tues thurs sat pre dialysis      dextromethorphan-guaiFENesin (MUCINEX DM)  MG per extended release tablet Take 1 tablet by mouth 2 times daily       omeprazole (PRILOSEC) 20 MG delayed release capsule Take 20 mg by mouth daily      chlorhexidine (PERIDEX) 0.12 % solution Take 15 mLs by mouth 2 times daily      sennosides-docusate sodium (SENOKOT-S) 8.6-50 MG tablet Take 2 tablets by mouth at bedtime       sertraline (ZOLOFT) 50 MG tablet Take 50 mg by mouth daily      terazosin (HYTRIN) 1 MG capsule Take 1 mg by mouth nightly      ondansetron (ZOFRAN) 4 MG tablet Take 4 mg by mouth every 8 hours as needed for Nausea or Vomiting         Allergies:  Haloperidol lactate and Ziprasidone hcl    Social History:    Social History     Socioeconomic History    Marital status:      Spouse name: Not on file    Number of children: Not on file    Years of education: Not on file    Highest education level: Not on file   Occupational History    Not on file   Tobacco Use    Smoking status: Former Smoker    Smokeless tobacco: Never Used   Substance and Sexual Activity    Alcohol use: Never    Drug use: Never    Sexual activity: Not on file   Other Topics Concern    Not on file   Social History Narrative    Not on file     Social Determinants of Health     Financial Resource Strain:     Difficulty of Paying Living Expenses: Not on file   Food Insecurity:     Worried About Running Out of Food in the Last Year: Not on file    Ran Out of Food in the Last Year: Not on file   Transportation Needs:     Lack of Transportation (Medical): Not on file    Lack of Transportation (Non-Medical): Not on file   Physical Activity:     Days of Exercise per Week: Not on file    Minutes of Exercise per Session: Not on file   Stress:     Feeling of Stress : Not on file   Social Connections:     Frequency of Communication with Friends and Family: Not on file    Frequency of Social Gatherings with Friends and Family: Not on file    Attends Yazidism Services: Not on file    Active Member of [address] [place] [address] [employer] or Organizations: Not on file    Attends Club or Organization Meetings: Not on file    Marital Status: Not on file   Intimate Partner Violence:     Fear of Current or Ex-Partner: Not on file    Emotionally Abused: Not on file    Physically Abused: Not on file    Sexually Abused: Not on file   Housing Stability:     Unable to Pay for Housing in the Last Year: Not on file    Number of [place] in the Last Year: Not on file    Unstable Housing in the Last Year: Not on file       Family History:   No family history on file. REVIEW OF SYSTEMS:      10 pt ROS done, relevant features as in Native, rest negative       PHYSICAL EXAM:    Vitals:    /71   Pulse 77   Temp 98.3 °F (36.8 °C) (Oral)   Resp 18   Ht 5' 4\" (1.626 m)   Wt 297 lb (134.7 kg)   SpO2 100%   BMI 50.98 kg/m²   No intake/output data recorded. No intake/output data recorded. Physical Exam:  Gen:  alert, oriented x 3, obese  PERRL , EOM +  Pallor +, No icterus  JVP not raised , tracheostomy  CV: RRR no murmur or rub . Lungs:B/ L air entry, Normal breath sounds   Abd: soft, bowel sounds + , No organomegaly   Ext: No edema, no cyanosis  Skin: Warm.   No rash  Left upper arm AV graft with good thrill  Neuro: nonfocal.      DATA:    CBC with Differential:    Lab Results   Component Value Date    WBC 19.2 04/26/2022    RBC 2.42 04/26/2022    HGB 7.0 04/27/2022    HCT 22.5 04/27/2022     04/26/2022    MCV 95.7 04/26/2022    MCH 29.5 04/26/2022    MCHC 30.9 04/26/2022    RDW 18.9 04/26/2022    LYMPHOPCT 4.3 04/26/2022    MONOPCT 2.6 04/26/2022    BASOPCT 0.3 04/26/2022    MONOSABS 0.5 04/26/2022    LYMPHSABS 0.8 04/26/2022    EOSABS 0.3 04/26/2022    BASOSABS 0.1 04/26/2022     CMP:    Lab Results   Component Value Date     04/27/2022    K 4.2 04/27/2022    CL 89 04/27/2022    CO2 24 04/27/2022    BUN 81 04/27/2022    CREATININE 7.6 04/27/2022    GFRAA 6 04/27/2022    AGRATIO 0.7 04/27/2022    LABGLOM 5 04/27/2022    GLUCOSE 87 04/27/2022    PROT 6.4 04/27/2022    LABALBU 2.7 04/27/2022    CALCIUM 8.7 04/27/2022    BILITOT 0.3 04/27/2022    ALKPHOS 101 04/27/2022    AST 8 04/27/2022    ALT 6 04/27/2022     Magnesium:  No results found for: MG  Phosphorus:    Lab Results   Component Value Date    PHOS 5.6 02/18/2022     PT/INR:    Lab Results   Component Value Date    PROTIME 15.7 04/27/2022    INR 1.37 04/27/2022     Troponin:    Lab Results   Component Value Date    TROPONINI 0.37 02/17/2022     U/A:  No results found for: NITRITE, COLORU, PROTEINU, PHUR, LABCAST, WBCUA, RBCUA, MUCUS, TRICHOMONAS, YEAST, BACTERIA, CLARITYU, SPECGRAV, LEUKOCYTESUR, UROBILINOGEN, BILIRUBINUR, BLOODU, GLUCOSEU, AMORPHOUS        IMPRESSION/RECOMMENDATIONS:      1. ESRD on HD: Does HD at [facility] Tuesday Thursday Saturday, there is no evidence of fluid overload or hyperkalemia hence will do dialysis as scheduled day Thursday, 4/28/2022    2. Vaginal Bleeding  3. Anemia : Hemoglobin 7 treatment per primary team for the bleeding   4. Renal osteodystrophy: Monitor calcium phosphorus    Thank you for allowing me to participate in the care of this patient. I will continue to follow along. Please call with questions.     Donna Hills MD, MD  4/27/2022  [facility]

## 2022-04-27 NOTE — CARE COORDINATION
Discharge Planning Note:    Talked with Agustin [place]: [phone]    - Patient is form [address] and is able to return upon discharge. - If patient needs SNF, will need a pre-cert. - Patient has a [place]. Patient has HD. Risk of Readmission Score: 30%    Will continue to follow.     LAMBERT Damon RN    [facility]  Phone: [phone]

## 2022-04-27 NOTE — CONSULTS
Department of Gynecology  Consult Note      Reason for Consult:  Vaginal bleeding  Requesting Physician:  Zoya    CHIEF COMPLAINT:   Vaginal bleeding noted by nurses    History obtained from patient    HISTORY OF PRESENT ILLNESS:     The patient is a 80 y.o. female with significant past medical history of multiple medical issues including anticoagulation with warfarin for a fib who presents with vaginal bleeding. Her INR was 4.89. She denies pelvic pain. States bleeding has resolved today. Her main complaint is she is hungry and demands food. SHe denies any abnormal paps, fibroids. Prior c section x 2, no vaginal deliveries. Past Medical History:        Diagnosis Date    Acute and chronic respiratory failure [facility]acute-on-chronic) ([facility]     Acute blood loss anemia 7/1/2020    Acute deep vein thrombosis (DVT) of brachial vein of left upper extremity ([address] 2/20/2019    Formatting of this note might be different from the original. Dx February 2019    Anxiety disorder     Atherosclerotic heart disease of native coronary artery without angina pectoris     Bleeding hemorrhoids 6/29/2020    Cerebellar infarct ([address] 9/29/2019    Cerebral infarction ([address]     Chronic pain syndrome     Dependence on renal dialysis ([address]     Dependence [facility] respirator ([facility])     End stage renal disease ([address]     Gastro-esophageal reflux disease with esophagitis, without bleeding     Insomnia     Major depressive disorder, recurrent ([address]     Morbid obesity due to excess calories ([address]     Muscle weakness     Obstructive sleep apnea (adult) (pediatric)     Other hyperlipidemia     Other voice and resonance disorders     Personal history of COVID-19     Pulmonary hypertension ([address]     Tracheostomy status ([address]     Type 2 diabetes mellitus without complications ([address]     Unspecified atrial fibrillation ([address]      Past Surgical History:    No past surgical history on file.     Past Gynecological History:    Menopause in her 46s, denies taking HRT.  Last pap unknown    meds:  Current Facility-Administered Medications:     morphine (PF) injection 2 mg, 2 mg, IntraVENous, Q2H PRN, ITALO Uribe - CNP, 2 mg at 04/27/22 1220    sodium chloride flush 0.9 % injection 10 mL, 10 mL, IntraVENous, 2 times per day, Ahmad A Zoya, DO    sodium chloride flush 0.9 % injection 10 mL, 10 mL, IntraVENous, PRN, Harryd A Zoya, DO    0.9 % sodium chloride infusion, , IntraVENous, PRN, Ahmad A Zoya, DO    promethazine (PHENERGAN) tablet 12.5 mg, 12.5 mg, Oral, Q6H PRN **OR** ondansetron (ZOFRAN) injection 4 mg, 4 mg, IntraVENous, Q6H PRN, Haydee Velizzi, DO    acetaminophen (TYLENOL) tablet 650 mg, 650 mg, Oral, Q6H PRN, 650 mg at 04/26/22 2324 **OR** acetaminophen (TYLENOL) suppository 650 mg, 650 mg, Rectal, Q6H PRN, Haydee Velizzi, DO    cefTRIAXone (ROCEPHIN) 1000 mg in sterile water 10 mL IV syringe, 1,000 mg, IntraVENous, Q24H, Ahmad A Zoya, DO    atorvastatin (LIPITOR) tablet 80 mg, 80 mg, Oral, Nightly, Ahmad A Zoya, DO, 80 mg at 04/26/22 2324    dextromethorphan-guaiFENesin ([address] DM)  MG per extended release tablet 1 tablet, 1 tablet, Oral, BID, Ahmad A Zoya, DO, 1 tablet at 04/27/22 1023    docusate sodium (COLACE) capsule 100 mg, 100 mg, Oral, BID, Ahmad A Zoya, DO, 100 mg at 04/26/22 2324    insulin glargine (LANTUS) injection vial 10 Units, 10 Units, SubCUTAneous, Daily, Ahmad A Zoya, DO, 10 Units at 04/27/22 1024    QUEtiapine (SEROQUEL) tablet 12.5 mg, 12.5 mg, Oral, Nightly, John Kenny, DO, 12.5 mg at 04/26/22 2323    sertraline (ZOLOFT) tablet 50 mg, 50 mg, Oral, Daily, John Kenny, , 50 mg at 04/27/22 1016    pantoprazole (PROTONIX) tablet 40 mg, 40 mg, Oral, QAM AC, John Kenny, DO, 40 mg at 04/27/22 0636    glucose (GLUTOSE) 40 % oral gel 15 g, 15 g, Oral, PRN, John Kenny DO    glucagon (rDNA) injection 1 mg, 1 mg, IntraMUSCular, PRN, Soumya Kenny DO   dextrose 5 % solution, 100 mL/hr, IntraVENous, PRN, Ahmad A Zoya, DO    insulin lispro (HUMALOG) injection vial 0-6 Units, 0-6 Units, SubCUTAneous, TID WC, Ahmad A Zoya, DO    insulin lispro (HUMALOG) injection vial 0-3 Units, 0-3 Units, SubCUTAneous, Nightly, Ahmad A Zoya, DO    labetalol (NORMODYNE;TRANDATE) injection 10 mg, 10 mg, IntraVENous, Q4H PRN, Ahmad A Zoya, DO    dextrose bolus (hypoglycemia) 10% 125 mL, 125 mL, IntraVENous, PRN **OR** dextrose bolus (hypoglycemia) 10% 250 mL, 250 mL, IntraVENous, PRN, Raad Kenny, DO    vancomycin (VANCOCIN) intermittent dosing (placeholder), , Other, RX Placeholder, Harryd A Zoya, DO    ipratropium-albuterol (DUONEB) nebulizer solution 1 ampule, 1 ampule, Inhalation, Q4H PRN, Ahmad A Zoya, DO    ipratropium-albuterol (DUONEB) nebulizer solution 3 mL, 1 vial, Inhalation, Q4H WA, Harryd A Zoya, DO, 3 mL at 04/27/22 1208       Allergies:  Haloperidol lactate and Ziprasidone hcl     Social History:  TOBACCO:   reports that she has quit smoking. She has never used smokeless tobacco.  ETOH:   reports no history of alcohol use. DRUGS:   reports no history of drug use.             PHYSICAL EXAM:    Vitals:  BP (!) 119/57   Pulse 74   Temp 98.2 °F (36.8 °C) (Oral)   Resp 19   Ht 5' 4\" (1.626 m)   Wt 297 lb (134.7 kg)   SpO2 96%   BMI 50.98 kg/m²     CONSTITUTIONAL:  awake, alert, distracted, no apparent distress and morbidly obese  ABDOMEN:  Morbidly obese, nontender, large panniculus  SKIN:  no bruising or bleeding  Pt refuses pelvic exam     DATA:  Recent Labs     04/26/22  1219 04/26/22  1536 04/26/22  2146 04/27/22  0524   WBC 19.2*  --   --   --    HGB 7.1* 7.2* 7.0* 7.0*   HCT 23.2* 23.3* 23.3* 22.5*   *  --   --   --      Recent Labs     04/26/22  1219 04/27/22  0524   * 132*   K 4.1 4.2   CL 90* 89*   CO2 30 24   BUN 68* 81*   CREATININE 6.7* 7.6*   CALCIUM 9.5 8.7   AST 7* 8*   ALT 7* 6*       Labs:    CBC:   Lab Results Component Value Date    WBC 19.2 04/26/2022    RBC 2.42 04/26/2022    HGB 7.0 04/27/2022    HCT 22.5 04/27/2022    MCV 95.7 04/26/2022    RDW 18.9 04/26/2022     04/26/2022   Results for Linnette Lund (MRN [id number]) as of 4/27/2022 14:43   Ref. Range 4/26/2022 11:58 4/27/2022 05:24   Prothrombin Time Latest Ref Range: 9.9 - 12.7 sec 58.6 (H) 15.7 (H)   INR Latest Ref Range: 0.88 - 1.12  4.86 (HH) 1.37 (H)     CT OF THE PELVIS WITHOUT CONTRAST 3/3/2022 4:19 pm    Bulky uterus with multiple calcified fibroids         IMPRESSION/RECOMMENDATIONS:      Principal Problem:    Vaginal bleeding  Plan: pelvic ultrasound . Bleeding has resolved and she declined pelvic exam today. If ultrasound is normal may observe unless she is willing to be examined. Likely etiology is excessive anticoagulation.

## 2022-04-28 LAB
A/G RATIO: 0.6 (ref 1.1–2.2)
ALBUMIN SERPL-MCNC: 2.6 G/DL (ref 3.4–5)
ALP BLD-CCNC: 100 U/L (ref 40–129)
ALT SERPL-CCNC: 6 U/L (ref 10–40)
ANION GAP SERPL CALCULATED.3IONS-SCNC: 21 MMOL/L (ref 3–16)
ANISOCYTOSIS: ABNORMAL
AST SERPL-CCNC: 8 U/L (ref 15–37)
BANDED NEUTROPHILS RELATIVE PERCENT: 4 % (ref 0–7)
BASOPHILS ABSOLUTE: 0 K/UL (ref 0–0.2)
BASOPHILS RELATIVE PERCENT: 0 %
BILIRUB SERPL-MCNC: 0.3 MG/DL (ref 0–1)
BLOOD BANK DISPENSE STATUS: NORMAL
BLOOD BANK PRODUCT CODE: NORMAL
BPU ID: NORMAL
BUN BLDV-MCNC: 92 MG/DL (ref 7–20)
BURR CELLS: ABNORMAL
CALCIUM SERPL-MCNC: 8.3 MG/DL (ref 8.3–10.6)
CHLORIDE BLD-SCNC: 88 MMOL/L (ref 99–110)
CO2: 24 MMOL/L (ref 21–32)
CREAT SERPL-MCNC: 8.2 MG/DL (ref 0.6–1.2)
DESCRIPTION BLOOD BANK: NORMAL
EOSINOPHILS ABSOLUTE: 0.3 K/UL (ref 0–0.6)
EOSINOPHILS RELATIVE PERCENT: 3 %
ESTIMATED AVERAGE GLUCOSE: 108.3 MG/DL
GFR AFRICAN AMERICAN: 6
GFR NON-AFRICAN AMERICAN: 5
GLUCOSE BLD-MCNC: 102 MG/DL (ref 70–99)
GLUCOSE BLD-MCNC: 126 MG/DL (ref 70–99)
GLUCOSE BLD-MCNC: 87 MG/DL (ref 70–99)
GLUCOSE BLD-MCNC: 89 MG/DL (ref 70–99)
HBA1C MFR BLD: 5.4 %
HCT VFR BLD CALC: 22.8 % (ref 36–48)
HEMATOLOGY PATH CONSULT: NORMAL
HEMATOLOGY PATH CONSULT: YES
HEMOGLOBIN: 6.9 G/DL (ref 12–16)
INR BLD: 1.19 (ref 0.88–1.12)
LYMPHOCYTES ABSOLUTE: 0.7 K/UL (ref 1–5.1)
LYMPHOCYTES RELATIVE PERCENT: 7 %
MCH RBC QN AUTO: 30.5 PG (ref 26–34)
MCHC RBC AUTO-ENTMCNC: 30.3 G/DL (ref 31–36)
MCV RBC AUTO: 100.7 FL (ref 80–100)
MONOCYTES ABSOLUTE: 0 K/UL (ref 0–1.3)
MONOCYTES RELATIVE PERCENT: 0 %
NEUTROPHILS ABSOLUTE: 9 K/UL (ref 1.7–7.7)
NEUTROPHILS RELATIVE PERCENT: 86 %
PDW BLD-RTO: 19.6 % (ref 12.4–15.4)
PELGER HUET CELLS: PRESENT
PERFORMED ON: ABNORMAL
PERFORMED ON: ABNORMAL
PERFORMED ON: NORMAL
PLATELET # BLD: 444 K/UL (ref 135–450)
PLATELET SLIDE REVIEW: ADEQUATE
PMV BLD AUTO: 8 FL (ref 5–10.5)
POTASSIUM REFLEX MAGNESIUM: 4.6 MMOL/L (ref 3.5–5.1)
PROTHROMBIN TIME: 13.6 SEC (ref 9.9–12.7)
RBC # BLD: 2.27 M/UL (ref 4–5.2)
SLIDE REVIEW: ABNORMAL
SODIUM BLD-SCNC: 133 MMOL/L (ref 136–145)
TOTAL PROTEIN: 6.8 G/DL (ref 6.4–8.2)
VANCOMYCIN RANDOM: 25.5 UG/ML
WBC # BLD: 10 K/UL (ref 4–11)

## 2022-04-28 PROCEDURE — 85025 COMPLETE CBC W/AUTO DIFF WBC: CPT

## 2022-04-28 PROCEDURE — 87040 BLOOD CULTURE FOR BACTERIA: CPT

## 2022-04-28 PROCEDURE — 6360000002 HC RX W HCPCS: Performed by: INTERNAL MEDICINE

## 2022-04-28 PROCEDURE — 80053 COMPREHEN METABOLIC PANEL: CPT

## 2022-04-28 PROCEDURE — 36430 TRANSFUSION BLD/BLD COMPNT: CPT

## 2022-04-28 PROCEDURE — 5A1D70Z PERFORMANCE OF URINARY FILTRATION, INTERMITTENT, LESS THAN 6 HOURS PER DAY: ICD-10-PCS | Performed by: INTERNAL MEDICINE

## 2022-04-28 PROCEDURE — 94761 N-INVAS EAR/PLS OXIMETRY MLT: CPT

## 2022-04-28 PROCEDURE — 2580000003 HC RX 258: Performed by: INTERNAL MEDICINE

## 2022-04-28 PROCEDURE — 6370000000 HC RX 637 (ALT 250 FOR IP): Performed by: INTERNAL MEDICINE

## 2022-04-28 PROCEDURE — 2700000000 HC OXYGEN THERAPY PER DAY

## 2022-04-28 PROCEDURE — 94640 AIRWAY INHALATION TREATMENT: CPT

## 2022-04-28 PROCEDURE — 6360000002 HC RX W HCPCS: Performed by: NURSE PRACTITIONER

## 2022-04-28 PROCEDURE — 84165 PROTEIN E-PHORESIS SERUM: CPT

## 2022-04-28 PROCEDURE — 82784 ASSAY IGA/IGD/IGG/IGM EACH: CPT

## 2022-04-28 PROCEDURE — 6370000000 HC RX 637 (ALT 250 FOR IP): Performed by: NURSE PRACTITIONER

## 2022-04-28 PROCEDURE — 1200000000 HC SEMI PRIVATE

## 2022-04-28 PROCEDURE — 84155 ASSAY OF PROTEIN SERUM: CPT

## 2022-04-28 PROCEDURE — 83883 ASSAY NEPHELOMETRY NOT SPEC: CPT

## 2022-04-28 PROCEDURE — 80202 ASSAY OF VANCOMYCIN: CPT

## 2022-04-28 PROCEDURE — 36415 COLL VENOUS BLD VENIPUNCTURE: CPT

## 2022-04-28 PROCEDURE — 85610 PROTHROMBIN TIME: CPT

## 2022-04-28 PROCEDURE — 90935 HEMODIALYSIS ONE EVALUATION: CPT

## 2022-04-28 RX ORDER — DIPHENHYDRAMINE HYDROCHLORIDE 50 MG/ML
25 INJECTION INTRAMUSCULAR; INTRAVENOUS EVERY 6 HOURS PRN
Status: DISCONTINUED | OUTPATIENT
Start: 2022-04-28 | End: 2022-05-01 | Stop reason: HOSPADM

## 2022-04-28 RX ORDER — SODIUM CHLORIDE 9 MG/ML
INJECTION, SOLUTION INTRAVENOUS PRN
Status: DISCONTINUED | OUTPATIENT
Start: 2022-04-28 | End: 2022-05-01 | Stop reason: HOSPADM

## 2022-04-28 RX ADMIN — GUAIFENESIN AND DEXTROMETHORPHAN HYDROBROMIDE 1 TABLET: 600; 30 TABLET, EXTENDED RELEASE ORAL at 08:45

## 2022-04-28 RX ADMIN — SERTRALINE 50 MG: 50 TABLET, FILM COATED ORAL at 08:34

## 2022-04-28 RX ADMIN — INSULIN GLARGINE 10 UNITS: 100 INJECTION, SOLUTION SUBCUTANEOUS at 08:48

## 2022-04-28 RX ADMIN — IPRATROPIUM BROMIDE AND ALBUTEROL SULFATE 3 ML: .5; 3 SOLUTION RESPIRATORY (INHALATION) at 20:54

## 2022-04-28 RX ADMIN — Medication 10 ML: at 21:44

## 2022-04-28 RX ADMIN — WARFARIN SODIUM 3 MG: 2 TABLET ORAL at 16:21

## 2022-04-28 RX ADMIN — Medication 10 ML: at 08:51

## 2022-04-28 RX ADMIN — DOCUSATE SODIUM 100 MG: 100 CAPSULE, LIQUID FILLED ORAL at 21:33

## 2022-04-28 RX ADMIN — IPRATROPIUM BROMIDE AND ALBUTEROL SULFATE 3 ML: .5; 3 SOLUTION RESPIRATORY (INHALATION) at 08:16

## 2022-04-28 RX ADMIN — SODIUM CHLORIDE, PRESERVATIVE FREE 10 ML: 5 INJECTION INTRAVENOUS at 16:24

## 2022-04-28 RX ADMIN — MORPHINE SULFATE 2 MG: 2 INJECTION, SOLUTION INTRAMUSCULAR; INTRAVENOUS at 16:21

## 2022-04-28 RX ADMIN — MORPHINE SULFATE 2 MG: 2 INJECTION, SOLUTION INTRAMUSCULAR; INTRAVENOUS at 21:33

## 2022-04-28 RX ADMIN — QUETIAPINE FUMARATE 12.5 MG: 25 TABLET ORAL at 21:33

## 2022-04-28 RX ADMIN — DIPHENHYDRAMINE HYDROCHLORIDE 25 MG: 50 INJECTION, SOLUTION INTRAMUSCULAR; INTRAVENOUS at 05:06

## 2022-04-28 RX ADMIN — DIPHENHYDRAMINE HYDROCHLORIDE 25 MG: 50 INJECTION, SOLUTION INTRAMUSCULAR; INTRAVENOUS at 22:32

## 2022-04-28 RX ADMIN — GUAIFENESIN AND DEXTROMETHORPHAN HYDROBROMIDE 1 TABLET: 600; 30 TABLET, EXTENDED RELEASE ORAL at 21:33

## 2022-04-28 RX ADMIN — PANTOPRAZOLE SODIUM 40 MG: 40 TABLET, DELAYED RELEASE ORAL at 05:06

## 2022-04-28 RX ADMIN — DOCUSATE SODIUM 100 MG: 100 CAPSULE, LIQUID FILLED ORAL at 08:34

## 2022-04-28 RX ADMIN — ONDANSETRON 4 MG: 2 INJECTION INTRAMUSCULAR; INTRAVENOUS at 02:34

## 2022-04-28 RX ADMIN — MORPHINE SULFATE 2 MG: 2 INJECTION, SOLUTION INTRAMUSCULAR; INTRAVENOUS at 08:34

## 2022-04-28 RX ADMIN — ATORVASTATIN CALCIUM 80 MG: 80 TABLET, FILM COATED ORAL at 21:33

## 2022-04-28 RX ADMIN — IPRATROPIUM BROMIDE AND ALBUTEROL SULFATE 3 ML: .5; 3 SOLUTION RESPIRATORY (INHALATION) at 16:03

## 2022-04-28 RX ADMIN — MORPHINE SULFATE 2 MG: 2 INJECTION, SOLUTION INTRAMUSCULAR; INTRAVENOUS at 00:09

## 2022-04-28 ASSESSMENT — PAIN SCALES - GENERAL
PAINLEVEL_OUTOF10: 9
PAINLEVEL_OUTOF10: 8
PAINLEVEL_OUTOF10: 4
PAINLEVEL_OUTOF10: 9
PAINLEVEL_OUTOF10: 9
PAINLEVEL_OUTOF10: 4
PAINLEVEL_OUTOF10: 0
PAINLEVEL_OUTOF10: 4
PAINLEVEL_OUTOF10: 8

## 2022-04-28 ASSESSMENT — PAIN DESCRIPTION - ORIENTATION
ORIENTATION: RIGHT
ORIENTATION: RIGHT;LEFT
ORIENTATION: RIGHT

## 2022-04-28 ASSESSMENT — PAIN DESCRIPTION - PAIN TYPE
TYPE: ACUTE PAIN
TYPE: ACUTE PAIN

## 2022-04-28 ASSESSMENT — PAIN DESCRIPTION - LOCATION
LOCATION: FOOT
LOCATION: HIP;LEG
LOCATION: FOOT

## 2022-04-28 ASSESSMENT — PAIN DESCRIPTION - DESCRIPTORS
DESCRIPTORS: ACHING
DESCRIPTORS: ACHING;SHOOTING
DESCRIPTORS: ACHING;SHOOTING
DESCRIPTORS: ACHING

## 2022-04-28 ASSESSMENT — PAIN - FUNCTIONAL ASSESSMENT: PAIN_FUNCTIONAL_ASSESSMENT: PREVENTS OR INTERFERES SOME ACTIVE ACTIVITIES AND ADLS

## 2022-04-28 ASSESSMENT — PAIN DESCRIPTION - FREQUENCY
FREQUENCY: CONTINUOUS

## 2022-04-28 NOTE — PLAN OF CARE
Problem: Chronic Conditions and Co-morbidities  Goal: Patient's chronic conditions and co-morbidity symptoms are monitored and maintained or improved  4/28/2022 1119 by Jaylin Adam RN  Outcome: Progressing  4/27/2022 2242 by Lizet Segovia RN  Outcome: Progressing     Problem: Discharge Planning  Goal: Discharge to home or other facility with appropriate resources  4/28/2022 1119 by Jaylin Adam RN  Outcome: Progressing  4/27/2022 2242 by Lizet Segovia RN  Outcome: Progressing     Problem: Pain  Goal: Verbalizes/displays adequate comfort level or baseline comfort level  4/28/2022 1119 by Jaylin Adam RN  Outcome: Progressing  4/27/2022 2242 by Lizet Segovia RN  Outcome: Progressing     Problem: Skin/Tissue Integrity  Goal: Absence of new skin breakdown  Description: 1. Monitor for areas of redness and/or skin breakdown  2. Assess vascular access sites hourly  3. Every 4-6 hours minimum:  Change oxygen saturation probe site  4. Every 4-6 hours:  If on nasal continuous positive airway pressure, respiratory therapy assess nares and determine need for appliance change or resting period.   4/28/2022 1119 by Jaylin Adam RN  Outcome: Progressing  4/27/2022 2242 by Lizet Segovia RN  Outcome: Progressing

## 2022-04-28 NOTE — CONSULTS
Clinical Pharmacy Note: Pharmacy to Dose Warfarin    Pharmacy consulted by Steve Leroy   to dose warfarin. Tammie Coronel is a 80 y.o. female  is receiving warfarin for indication: afib and ho DVT . INR Goal Range: 2-3  Prior to admission warfarin dosing regimen: Spoke w/ RN at [facility]: 6 mg daily is her normal but they recently decreased to 3 mg daily because she was on abx and then held the last 4 days prior to her being admitted. INR today:   Lab Results   Component Value Date    INR 1.37 04/27/2022       Assessment/Plan:  INR is subtherapeutic on after holding for the last 6 days and after receiving 10 mg IV vitamin K   Based on today's assessment, dose warfarin 3 mg today. Daily INR is ordered. Pharmacy will continue to monitor and make adjustments to regimen as necessary.      Thank you for the consult,     Maynor Sharma, PharmD  Pharmacy Resident   [id number]

## 2022-04-28 NOTE — PROGRESS NOTES
[facility]    4/28/2022 7:35 AM        Name: Jaci Brower . Admitted: 4/26/2022  Primary Care Provider: Jaden Phillips MD (Tel: [phone])      Subjective: Cole Anahi Pt seen this am  She is more alert and conversant today  No current reports of pain     She is a long term resident at [facility] with hx of ESRD on HD q T Thurs Sat  Prior stroke ,  Has not walked in [place]"     On long term AC with coumadin.     Sought care due to vaginal bleeding     + trach     Nondiagnostic evaluation per US due to non visualization     Reviewed interval ancillary notes    Current Medications  diphenhydrAMINE (BENADRYL) injection 25 mg, Q6H PRN  morphine (PF) injection 2 mg, Q2H PRN  sodium chloride flush 0.9 % injection 10 mL, 2 times per day  sodium chloride flush 0.9 % injection 10 mL, PRN  0.9 % sodium chloride infusion, PRN  promethazine (PHENERGAN) tablet 12.5 mg, Q6H PRN   Or  ondansetron (ZOFRAN) injection 4 mg, Q6H PRN  acetaminophen (TYLENOL) tablet 650 mg, Q6H PRN   Or  acetaminophen (TYLENOL) suppository 650 mg, Q6H PRN  cefTRIAXone (ROCEPHIN) 1000 mg in sterile water 10 mL IV syringe, Q24H  atorvastatin (LIPITOR) tablet 80 mg, Nightly  dextromethorphan-guaiFENesin (MUCINEX DM)  MG per extended release tablet 1 tablet, BID  docusate sodium (COLACE) capsule 100 mg, BID  insulin glargine (LANTUS) injection vial 10 Units, Daily  QUEtiapine (SEROQUEL) tablet 12.5 mg, Nightly  sertraline (ZOLOFT) tablet 50 mg, Daily  pantoprazole (PROTONIX) tablet 40 mg, QAM AC  glucose (GLUTOSE) 40 % oral gel 15 g, PRN  glucagon (rDNA) injection 1 mg, PRN  dextrose 5 % solution, PRN  insulin lispro (HUMALOG) injection vial 0-6 Units, TID WC  insulin lispro (HUMALOG) injection vial 0-3 Units, Nightly  labetalol (NORMODYNE;TRANDATE) injection 10 mg, Q4H PRN  dextrose bolus (hypoglycemia) 10% 125 mL, PRN   Or  dextrose bolus (hypoglycemia) 10% 250 mL, PRN  vancomycin (VANCOCIN) intermittent dosing (placeholder), RX Placeholder  ipratropium-albuterol (DUONEB) nebulizer solution 1 ampule, Q4H PRN  ipratropium-albuterol (DUONEB) nebulizer solution 3 mL, Q4H WA        Objective:  BP (!) 151/67   Pulse 77   Temp 98.1 °F (36.7 °C) (Oral)   Resp 16   Ht 5' 4\" (1.626 m)   Wt 297 lb (134.7 kg)   SpO2 100%   BMI 50.98 kg/m²     Intake/Output Summary (Last 24 hours) at 4/28/2022 0735  Last data filed at 4/27/2022 1220  Gross per 24 hour   Intake 0 ml   Output 0 ml   Net 0 ml      Wt Readings from Last 3 Encounters:   04/27/22 297 lb (134.7 kg)   04/26/22 291 lb (132 kg)   02/21/22 291 lb 11.2 oz (132.3 kg)       General appearance:  Appears chronically ill and obese but looks better today   Eyes: Sclera clear. Pupils equal.  ENT: Moist oral mucosa. +  Trach  Cardiovascular: Regular rhythm, normal S1, S2. No murmur. No edema in lower extremities  Respiratory: Not using accessory muscles. Good inspiratory effort. Clear to auscultation bilaterally, no wheeze or crackles. GI: Abdomen soft and obese, no tenderness, not distended, normal bowel sounds  Musculoskeletal: No cyanosis in digits, neck supple  Neurology: speech clear ,  Unable to move right leg which appears to be contracted. Moves bilat upper arms with no deficits . Power in left leg is 2/5  Psych: anxious  Affect at intervals re oriented to surroundings . Skin: Warm, dry, normal turgor    Labs and Tests:  CBC:   Recent Labs     04/26/22  1219 04/26/22  1219 04/26/22  1536 04/26/22  2146 04/27/22  0524   WBC 19.2*  --   --   --   --    HGB 7.1*   < > 7.2* 7.0* 7.0*   *  --   --   --   --     < > = values in this interval not displayed.      BMP:    Recent Labs     04/26/22  1219 04/27/22  0524 04/28/22  0528   * 132* 133*   K 4.1 4.2 4.6   CL 90* 89* 88*   CO2 30 24 24   BUN 68* 81* 92*   CREATININE 6.7* 7.6* 8.2*   GLUCOSE 94 87 89     Hepatic:   Recent Labs 04/26/22  1219 04/27/22  0524 04/28/22  0528   AST 7* 8* 8*   ALT 7* 6* 6*   BILITOT 0.4 0.3 0.3   ALKPHOS 107 101 100   Results for Earl Arrieta (MRN [id number]) as of 4/27/2022 08:03   Ref. Range 4/26/2022 22:40 4/27/2022 07:34   POC Glucose Latest Ref Range: 70 - 99 mg/dl 108 (H) 89     No radiographic evidence of acute pulmonary disease. INR 1.3     FINDINGS:   There is no evidence of acute fracture.  There is normal alignment of the   tarsometatarsal joints.  No acute joint abnormality.  No focal osseous   lesion. No focal soft tissue abnormality.  Diffuse osteopenia. Problem List  Principal Problem:    Vaginal bleeding  Resolved Problems:    * No resolved hospital problems. *       Assessment & Plan:   1. Vaginal bleeding:  Now resolved. Likely due to elevated INR, which is now at 1.3. She has been evaluated by GYN and has refused exam.  Pelvic US ordered  2. 1/2 BC with staph coag negative. Will stop the Vanc and repeat the [facility] today. 3. Acute on Chronic anemia :  Likely due to vaginal bleeding and anemia of chronic disease:  Hgb was 8.9 in February 2022 , now 6.9 give 1 unit PRBC with HD today   4. AF:  Rate controlled. resume coumadin today   5. Previous stroke:  Pt is long term resident at [facility]  6. ESRD: on HD q Tue, Thurs, Sat, nephrology is managing   7. SILVER/ previous trach  8. Likely ready to return back to [facility] in the am if hgb stable       Diet: ADULT DIET;  Regular; 4 carb choices (60 gm/meal)  Code:Full Code  DVT PPX      ITALO Quinones - CNP   4/28/2022 7:35 AM

## 2022-04-28 NOTE — PROGRESS NOTES
[address]  Patient has size 6 Shiley XLT or other. Trach Kit of same size on standby  Yes, Obturator at head of the bed  No. Inner cannula cleaned/replaced Yes, drain sponge changed  Yes, Trach tie replaced is soiled No, Flange cleaned  Yes. [address] performed without complications.

## 2022-04-28 NOTE — PROGRESS NOTES
Assessment completed. Patient in bed, alert and oriented and able to make all her needs known. Continues on trach and 6 LPM oxygen. C/o left foot pain. Medications administered as ordered. POC and education reviewed with patient and mutually agreed upon. Small BM noted, tho-care provided. Scant amount of old bloody drainage noted. MD was made aware of critical lab results. All needs met at this time. Call light in reach. Will continue to monitor. 1030: Patient off unit at this time for HD.    1115: One unit blood delivered to HD nurse. 1315: Patient returned from HD via bed, alert and oriented, denies any foot pain at this time. Set up with lunch. Call light in reach. Will continue to monitor.

## 2022-04-28 NOTE — PLAN OF CARE
Problem: Chronic Conditions and Co-morbidities  Goal: Patient's chronic conditions and co-morbidity symptoms are monitored and maintained or improved  4/27/2022 2242 by Emir Garg RN  Outcome: Progressing     Problem: Discharge Planning  Goal: Discharge to home or other facility with appropriate resources  4/27/2022 2242 by Emir Garg RN  Outcome: Progressing     Problem: Pain  Goal: Verbalizes/displays adequate comfort level or baseline comfort level  4/27/2022 2242 by Emir Garg RN  Outcome: Progressing     Problem: Skin/Tissue Integrity  Goal: Absence of new skin breakdown  Description: 1. Monitor for areas of redness and/or skin breakdown  2. Assess vascular access sites hourly  3. Every 4-6 hours minimum:  Change oxygen saturation probe site  4. Every 4-6 hours:  If on nasal continuous positive airway pressure, respiratory therapy assess nares and determine need for appliance change or resting period.   4/27/2022 2242 by Emir Garg RN  Outcome: Progressing

## 2022-04-28 NOTE — FLOWSHEET NOTE
04/28/22 1043 04/28/22 1426   Vital Signs   BP (!) 132/57 (!) 132/50   Temp 97.1 °F (36.2 °C) 97.7 °F (36.5 °C)   Pulse 75 76   Resp 20 18   Weight (!) 319 lb 10.7 oz (145 kg) (!) 310 lb 13.6 oz (141 kg)   Weight Method Bed scale Bed scale     Treatment time: 3.5HR  Net UF: 2l    Pre weight:   Post weight: 141KG  EDW:     Access used: RAG  Access function: Good    Medications or blood products given: none    Regular outpatient schedule:     Summary of response to treatment: Good    Copy of dialysis treatment record placed in chart, to be scanned into EMR.

## 2022-04-28 NOTE — PROGRESS NOTES
Shift assessment completed, pt is alert and oriented, VSS, pt is moved from 4467 to 4470 to access tim lift, placed on a specialty mattress, fall precautions in place, call light within reach, see flowsheets and MAR, will monitor pt. The care plan and education has been reviewed and mutually agreed upon with the patient.

## 2022-04-28 NOTE — PROGRESS NOTES
[address]  Patient has size 6 Shiley XLT Distal or other. Trach Kit of same size on standby  Yes, Obturator at head of the bed  No. Inner cannula cleaned/replaced Yes, drain sponge changed  Yes, Trach tie replaced is soiled No, Flange cleaned  Yes. [address] performed without complications.

## 2022-04-28 NOTE — PROGRESS NOTES
[facility]   Nephrology progress Note  [phone]   [facility]. [employer]           Reason for Consult:  ESRD on HD  The patient is a 80 y.o. female with significant past medical history of ESRD on maintenance hemodialysis Tuesday Thursday Saturday at [facility], obstructive sleep apnea with a tracheostomy, atrial fibrillation, type 2 diabetes mellitus, pulmonary hypertension, CVA, GERD, presented to the ED in [facility] with vaginal bleeding of unknown duration. She says she has been on hemodialysis for about 5 years and the and the main reason her kidneys failed was diabetes mellitus type 2  I asked her about foul discharge per vagina she says negative, she says the bleeding is more or less stopped  There is no fever      Interval  History:      Seen on hemodialysis  She apparently refused a pelvic exam  Getting 1 unit of blood on dialysis for hemoglobin less than 7 at 6.9  Complains of pain all over just received pain medication      PHYSICAL EXAM:    Vitals:    /71   Pulse 81   Temp 97.9 °F (36.6 °C) (Oral)   Resp 16   Ht 5' 4\" (1.626 m)   Wt (!) 304 lb 9.6 oz (138.2 kg)   SpO2 100%   BMI 52.28 kg/m²   No intake/output data recorded. No intake/output data recorded. Physical Exam:  Gen:  alert, oriented x 3, obese  PERRL , EOM +  Pallor +, No icterus  JVP not raised , tracheostomy  CV: RRR no murmur or rub . Lungs:B/ L air entry, Normal breath sounds   Abd: soft, bowel sounds + , No organomegaly   Ext: No edema, no cyanosis  Skin: Warm.   No rash  Left upper arm AV graft with good thrill  Neuro: nonfocal.      DATA:    CBC with Differential:    Lab Results   Component Value Date    WBC 10.0 04/28/2022    RBC 2.27 04/28/2022    HGB 6.9 04/28/2022    HCT 22.8 04/28/2022     04/28/2022    .7 04/28/2022    MCH 30.5 04/28/2022    MCHC 30.3 04/28/2022    RDW 19.6 04/28/2022    BANDSPCT 4 04/28/2022    LYMPHOPCT 7.0 04/28/2022    MONOPCT 0.0 04/28/2022    BASOPCT 0.0 04/28/2022    MONOSABS 0.0 04/28/2022    LYMPHSABS 0.7 04/28/2022    EOSABS 0.3 04/28/2022    BASOSABS 0.0 04/28/2022     CMP:    Lab Results   Component Value Date     04/28/2022    K 4.6 04/28/2022    CL 88 04/28/2022    CO2 24 04/28/2022    BUN 92 04/28/2022    CREATININE 8.2 04/28/2022    GFRAA 6 04/28/2022    AGRATIO 0.6 04/28/2022    LABGLOM 5 04/28/2022    GLUCOSE 89 04/28/2022    PROT 6.8 04/28/2022    LABALBU 2.6 04/28/2022    CALCIUM 8.3 04/28/2022    BILITOT 0.3 04/28/2022    ALKPHOS 100 04/28/2022    AST 8 04/28/2022    ALT 6 04/28/2022     Magnesium:  No results found for: MG  Phosphorus:    Lab Results   Component Value Date    PHOS 5.6 02/18/2022     PT/INR:    Lab Results   Component Value Date    PROTIME 15.7 04/27/2022    INR 1.37 04/27/2022     Troponin:    Lab Results   Component Value Date    TROPONINI 0.37 02/17/2022     U/A:  No results found for: NITRITE, COLORU, PROTEINU, PHUR, LABCAST, WBCUA, RBCUA, MUCUS, TRICHOMONAS, YEAST, BACTERIA, CLARITYU, SPECGRAV, LEUKOCYTESUR, UROBILINOGEN, BILIRUBINUR, BLOODU, GLUCOSEU, AMORPHOUS        IMPRESSION/RECOMMENDATIONS:      1. ESRD on HD: Does HD at [facility] Tuesday Thursday Saturday, there is no evidence of fluid overload or hyperkalemia hence will do dialysis as scheduled day Thursday, 4/28/2022    2. Vaginal Bleeding    3. Anemia : Hemoglobin 6.9  treatment per primary team for the bleeding     Getting blood   4. Renal osteodystrophy: Monitor calcium phosphorus    Thank you for allowing me to participate in the care of this patient. I will continue to follow along. Please call with questions.     Andrew Devlin MD, MD  4/28/2022  [facility]

## 2022-04-28 NOTE — PROGRESS NOTES
Physician Progress Note      PATIENT:               Avril Edmond  CSN #:                  [id number]  :                       1950  ADMIT DATE:       2022 6:47 PM  [address] [place] DATE:     PROVIDER #:        Ledy Sanders MD          QUERY TEXT:    Patient admitted with bleeding vaginally and from dialysis fistula and is on   chronic anticoagulation. If possible, please document in the progress notes   and discharge summary if you are evaluating and/or treating any of the   following: The medical record reflects the following:  Risk Factors: Coumadin    Clinical Indicators: INR () 4.86. HP, Patient does have history of   leiomyomas in the setting of supratherapeutic INR. Reversal of warfarin given   acute bleeding  OBgyn (), Likely etiology is excessive anticoagulation. Treatment: Tranexamix acid, PRBC, serial labs, OBgyn consult    Thank you,  [email or web address]  Options provided:  -- Bleeding associated with Coumadin. -- Bleeding unrelated to anticoagulation  -- Other - I will add my own diagnosis  -- Disagree - Not applicable / Not valid  -- Disagree - Clinically unable to determine / Unknown  -- Refer to Clinical Documentation Reviewer    PROVIDER RESPONSE TEXT:    This patient has bleeding associated with Coumadin. Query created by: Denae Perez on 2022 12:54 PM      QUERY TEXT:    Patient admitted with vaginal and dialysis fistula bleeding, noted to have   atrial fibrillation and is maintained on Coumadin. If possible, please   document in progress notes and discharge summary if you are evaluating and/or   treating any of the following:     The medical record reflects the following:  Risk Factors: A fib, previous stroke    Clinical Indicators: HP, Elevated [id number],Once bleeding subsidize will   need to restart anticoagulation as early as possible given elevated   [id number]    Treatment: serial labs, Warfarin management, supportive care    thank you,  [email or web address]  Options provided:  -- Secondary hypercoagulable state in a patient with atrial fibrillation  -- Other - I will add my own diagnosis  -- Disagree - Not applicable / Not valid  -- Disagree - Clinically unable to determine / Unknown  -- Refer to Clinical Documentation Reviewer    PROVIDER RESPONSE TEXT:    This patient has secondary hypercoagulable state related to atrial   fibrillation. Query created by:  Kylie Rogers on 4/28/2022 12:54 PM      Electronically signed by:  Elijah Ross MD 4/28/2022 12:57 PM

## 2022-04-28 NOTE — FLOWSHEET NOTE
04/28/22 1043 04/28/22 1426   Vital Signs   BP (!) 132/57 (!) 132/50   Temp 97.1 °F (36.2 °C) 97.7 °F (36.5 °C)   Pulse 75 76   Resp 20 18   Weight (!) 319 lb 10.7 oz (145 kg) (!) 310 lb 13.6 oz (141 kg)   Weight Method Bed scale Bed scale     Treatment time: 3.5hr    Net UF: 2L    Pre weight: 145kg  Post weight: 141kg  EDW: 130kg    Access used: RAG  Access function: Good    Medications or blood products given: none    Regular outpatient schedule: Kaylin ARREOLA    Summary of response to treatment: Good    Copy of dialysis treatment record placed in chart, to be scanned into EMR.

## 2022-04-29 LAB
ALBUMIN SERPL-MCNC: 2.8 G/DL (ref 3.4–5)
ANION GAP SERPL CALCULATED.3IONS-SCNC: 15 MMOL/L (ref 3–16)
ANISOCYTOSIS: ABNORMAL
BANDED NEUTROPHILS RELATIVE PERCENT: 11 % (ref 0–7)
BASOPHILS ABSOLUTE: 0 K/UL (ref 0–0.2)
BASOPHILS RELATIVE PERCENT: 0 %
BUN BLDV-MCNC: 49 MG/DL (ref 7–20)
CALCIUM SERPL-MCNC: 8.2 MG/DL (ref 8.3–10.6)
CHLORIDE BLD-SCNC: 96 MMOL/L (ref 99–110)
CO2: 26 MMOL/L (ref 21–32)
CREAT SERPL-MCNC: 6 MG/DL (ref 0.6–1.2)
EOSINOPHILS ABSOLUTE: 0.2 K/UL (ref 0–0.6)
EOSINOPHILS RELATIVE PERCENT: 2 %
GFR AFRICAN AMERICAN: 8
GFR NON-AFRICAN AMERICAN: 7
GLUCOSE BLD-MCNC: 125 MG/DL (ref 70–99)
GLUCOSE BLD-MCNC: 126 MG/DL (ref 70–99)
GLUCOSE BLD-MCNC: 128 MG/DL (ref 70–99)
GLUCOSE BLD-MCNC: 128 MG/DL (ref 70–99)
GLUCOSE BLD-MCNC: 131 MG/DL (ref 70–99)
HCT VFR BLD CALC: 23.2 % (ref 36–48)
HEMOGLOBIN: 7.1 G/DL (ref 12–16)
IGA: 685 MG/DL (ref 70–400)
IGG: 2604 MG/DL (ref 700–1600)
IGM: 94 MG/DL (ref 40–230)
INR BLD: 1.26 (ref 0.88–1.12)
LYMPHOCYTES ABSOLUTE: 1.1 K/UL (ref 1–5.1)
LYMPHOCYTES RELATIVE PERCENT: 11 %
MCH RBC QN AUTO: 30 PG (ref 26–34)
MCHC RBC AUTO-ENTMCNC: 30.7 G/DL (ref 31–36)
MCV RBC AUTO: 97.6 FL (ref 80–100)
MONOCYTES ABSOLUTE: 0.5 K/UL (ref 0–1.3)
MONOCYTES RELATIVE PERCENT: 5 %
MYELOCYTE PERCENT: 2 %
NEUTROPHILS ABSOLUTE: 7.9 K/UL (ref 1.7–7.7)
NEUTROPHILS RELATIVE PERCENT: 69 %
PDW BLD-RTO: 19.5 % (ref 12.4–15.4)
PELGER HUET CELLS: PRESENT
PERFORMED ON: ABNORMAL
PHOSPHORUS: 4.6 MG/DL (ref 2.5–4.9)
PLATELET # BLD: 446 K/UL (ref 135–450)
PLATELET SLIDE REVIEW: ADEQUATE
PMV BLD AUTO: 7.8 FL (ref 5–10.5)
POTASSIUM SERPL-SCNC: 4.3 MMOL/L (ref 3.5–5.1)
PROTHROMBIN TIME: 14.4 SEC (ref 9.9–12.7)
RBC # BLD: 2.37 M/UL (ref 4–5.2)
SLIDE REVIEW: ABNORMAL
SODIUM BLD-SCNC: 137 MMOL/L (ref 136–145)
URIC ACID, SERUM: 6.3 MG/DL (ref 2.6–6)
WBC # BLD: 9.6 K/UL (ref 4–11)

## 2022-04-29 PROCEDURE — 94640 AIRWAY INHALATION TREATMENT: CPT

## 2022-04-29 PROCEDURE — 80069 RENAL FUNCTION PANEL: CPT

## 2022-04-29 PROCEDURE — 85025 COMPLETE CBC W/AUTO DIFF WBC: CPT

## 2022-04-29 PROCEDURE — 84630 ASSAY OF ZINC: CPT

## 2022-04-29 PROCEDURE — 82525 ASSAY OF COPPER: CPT

## 2022-04-29 PROCEDURE — 94761 N-INVAS EAR/PLS OXIMETRY MLT: CPT

## 2022-04-29 PROCEDURE — 6370000000 HC RX 637 (ALT 250 FOR IP): Performed by: INTERNAL MEDICINE

## 2022-04-29 PROCEDURE — 6370000000 HC RX 637 (ALT 250 FOR IP): Performed by: NURSE PRACTITIONER

## 2022-04-29 PROCEDURE — 6360000002 HC RX W HCPCS: Performed by: NURSE PRACTITIONER

## 2022-04-29 PROCEDURE — 1200000000 HC SEMI PRIVATE

## 2022-04-29 PROCEDURE — 2580000003 HC RX 258: Performed by: INTERNAL MEDICINE

## 2022-04-29 PROCEDURE — 36415 COLL VENOUS BLD VENIPUNCTURE: CPT

## 2022-04-29 PROCEDURE — 2700000000 HC OXYGEN THERAPY PER DAY

## 2022-04-29 PROCEDURE — 84550 ASSAY OF BLOOD/URIC ACID: CPT

## 2022-04-29 PROCEDURE — 82668 ASSAY OF ERYTHROPOIETIN: CPT

## 2022-04-29 PROCEDURE — 85610 PROTHROMBIN TIME: CPT

## 2022-04-29 RX ADMIN — MORPHINE SULFATE 2 MG: 2 INJECTION, SOLUTION INTRAMUSCULAR; INTRAVENOUS at 15:53

## 2022-04-29 RX ADMIN — ACETAMINOPHEN 650 MG: 325 TABLET ORAL at 06:04

## 2022-04-29 RX ADMIN — ATORVASTATIN CALCIUM 80 MG: 80 TABLET, FILM COATED ORAL at 21:24

## 2022-04-29 RX ADMIN — IPRATROPIUM BROMIDE AND ALBUTEROL SULFATE 3 ML: .5; 3 SOLUTION RESPIRATORY (INHALATION) at 07:48

## 2022-04-29 RX ADMIN — WARFARIN SODIUM 3 MG: 2 TABLET ORAL at 18:45

## 2022-04-29 RX ADMIN — INSULIN GLARGINE 10 UNITS: 100 INJECTION, SOLUTION SUBCUTANEOUS at 08:46

## 2022-04-29 RX ADMIN — Medication 10 ML: at 22:04

## 2022-04-29 RX ADMIN — PANTOPRAZOLE SODIUM 40 MG: 40 TABLET, DELAYED RELEASE ORAL at 06:04

## 2022-04-29 RX ADMIN — QUETIAPINE FUMARATE 12.5 MG: 25 TABLET ORAL at 21:24

## 2022-04-29 RX ADMIN — MORPHINE SULFATE 2 MG: 2 INJECTION, SOLUTION INTRAMUSCULAR; INTRAVENOUS at 02:49

## 2022-04-29 RX ADMIN — DOCUSATE SODIUM 100 MG: 100 CAPSULE, LIQUID FILLED ORAL at 08:47

## 2022-04-29 RX ADMIN — IPRATROPIUM BROMIDE AND ALBUTEROL SULFATE 3 ML: .5; 3 SOLUTION RESPIRATORY (INHALATION) at 16:52

## 2022-04-29 RX ADMIN — SERTRALINE 50 MG: 50 TABLET, FILM COATED ORAL at 08:45

## 2022-04-29 RX ADMIN — IPRATROPIUM BROMIDE AND ALBUTEROL SULFATE 3 ML: .5; 3 SOLUTION RESPIRATORY (INHALATION) at 12:06

## 2022-04-29 RX ADMIN — GUAIFENESIN AND DEXTROMETHORPHAN HYDROBROMIDE 1 TABLET: 600; 30 TABLET, EXTENDED RELEASE ORAL at 08:49

## 2022-04-29 RX ADMIN — GUAIFENESIN AND DEXTROMETHORPHAN HYDROBROMIDE 1 TABLET: 600; 30 TABLET, EXTENDED RELEASE ORAL at 21:27

## 2022-04-29 RX ADMIN — DOCUSATE SODIUM 100 MG: 100 CAPSULE, LIQUID FILLED ORAL at 21:24

## 2022-04-29 RX ADMIN — IPRATROPIUM BROMIDE AND ALBUTEROL SULFATE 1 AMPULE: .5; 3 SOLUTION RESPIRATORY (INHALATION) at 03:00

## 2022-04-29 RX ADMIN — IPRATROPIUM BROMIDE AND ALBUTEROL SULFATE 3 ML: .5; 3 SOLUTION RESPIRATORY (INHALATION) at 21:05

## 2022-04-29 RX ADMIN — Medication 10 ML: at 08:50

## 2022-04-29 RX ADMIN — SODIUM CHLORIDE, PRESERVATIVE FREE 10 ML: 5 INJECTION INTRAVENOUS at 15:54

## 2022-04-29 ASSESSMENT — ENCOUNTER SYMPTOMS
CHEST TIGHTNESS: 1
COUGH: 1
SHORTNESS OF BREATH: 1
TROUBLE SWALLOWING: 1

## 2022-04-29 ASSESSMENT — PAIN SCALES - GENERAL
PAINLEVEL_OUTOF10: 4
PAINLEVEL_OUTOF10: 10
PAINLEVEL_OUTOF10: 0
PAINLEVEL_OUTOF10: 4
PAINLEVEL_OUTOF10: 6

## 2022-04-29 ASSESSMENT — PAIN DESCRIPTION - LOCATION
LOCATION: LEG
LOCATION: FOOT

## 2022-04-29 ASSESSMENT — PAIN DESCRIPTION - DESCRIPTORS
DESCRIPTORS: ACHING;SHOOTING

## 2022-04-29 ASSESSMENT — PAIN DESCRIPTION - ORIENTATION
ORIENTATION: RIGHT

## 2022-04-29 ASSESSMENT — PAIN DESCRIPTION - FREQUENCY
FREQUENCY: CONTINUOUS
FREQUENCY: CONTINUOUS

## 2022-04-29 ASSESSMENT — PAIN - FUNCTIONAL ASSESSMENT: PAIN_FUNCTIONAL_ASSESSMENT: PREVENTS OR INTERFERES SOME ACTIVE ACTIVITIES AND ADLS

## 2022-04-29 ASSESSMENT — PAIN DESCRIPTION - PAIN TYPE
TYPE: ACUTE PAIN
TYPE: ACUTE PAIN

## 2022-04-29 NOTE — PROGRESS NOTES
[facility] HOSPITALISTS PROGRESS NOTE    4/29/2022 8:35 AM        Name: Syl Benítez . Admitted: 4/26/2022  Primary Care Provider: Sonido Thomas MD (Tel: [phone])      Subjective: Allen Gaona Pt seen this am  Received 1 unit PRBC in HD on Thursday. Hgb only 7.1   Coumadin resumed Thursday  Reports right ankle/ foot pain, will check uric acid   Has not had any vaginal bleeding for several days. She is a long term resident at [facility] with hx of ESRD on HD q T Thurs Sat  Prior stroke ,  Has not walked in [place]"     On long term AC with coumadin.     Sought care due to vaginal bleeding     + trach     Nondiagnostic evaluation per US due to non visualization     Reviewed interval ancillary notes    Current Medications  warfarin (COUMADIN) tablet 3 mg, Daily  diphenhydrAMINE (BENADRYL) injection 25 mg, Q6H PRN  0.9 % sodium chloride infusion, PRN  morphine (PF) injection 2 mg, Q2H PRN  sodium chloride flush 0.9 % injection 10 mL, 2 times per day  sodium chloride flush 0.9 % injection 10 mL, PRN  0.9 % sodium chloride infusion, PRN  promethazine (PHENERGAN) tablet 12.5 mg, Q6H PRN   Or  ondansetron (ZOFRAN) injection 4 mg, Q6H PRN  acetaminophen (TYLENOL) tablet 650 mg, Q6H PRN   Or  acetaminophen (TYLENOL) suppository 650 mg, Q6H PRN  atorvastatin (LIPITOR) tablet 80 mg, Nightly  dextromethorphan-guaiFENesin (MUCINEX DM)  MG per extended release tablet 1 tablet, BID  docusate sodium (COLACE) capsule 100 mg, BID  insulin glargine (LANTUS) injection vial 10 Units, Daily  QUEtiapine (SEROQUEL) tablet 12.5 mg, Nightly  sertraline (ZOLOFT) tablet 50 mg, Daily  pantoprazole (PROTONIX) tablet 40 mg, QAM AC  glucose (GLUTOSE) 40 % oral gel 15 g, PRN  glucagon (rDNA) injection 1 mg, PRN  dextrose 5 % solution, PRN  insulin lispro (HUMALOG) injection vial 0-6 Units, TID WC  insulin lispro (HUMALOG) injection vial 0-3 Units, Nightly  labetalol (NORMODYNE;TRANDATE) injection 10 mg, Q4H PRN  dextrose bolus (hypoglycemia) 10% 125 mL, PRN   Or  dextrose bolus (hypoglycemia) 10% 250 mL, PRN  ipratropium-albuterol (DUONEB) nebulizer solution 1 ampule, Q4H PRN  ipratropium-albuterol (DUONEB) nebulizer solution 3 mL, Q4H WA        Objective:  /71   Pulse 81   Temp 97.7 °F (36.5 °C) (Axillary)   Resp 18   Ht 5' 4\" (1.626 m)   Wt (!) 318 lb 8 oz (144.5 kg)   SpO2 100%   BMI 54.67 kg/m²     Intake/Output Summary (Last 24 hours) at 4/29/2022 0835  Last data filed at 4/29/2022 0301  Gross per 24 hour   Intake 1562 ml   Output 2700 ml   Net -1138 ml      Wt Readings from Last 3 Encounters:   04/29/22 (!) 318 lb 8 oz (144.5 kg)   04/26/22 291 lb (132 kg)   02/21/22 291 lb 11.2 oz (132.3 kg)       General appearance:  Appears chronically ill and obese but looks better today   Eyes: Sclera clear. Pupils equal.  ENT: Moist oral mucosa. +  Trach  Cardiovascular: Regular rhythm, normal S1, S2. No murmur. No edema in lower extremities  Respiratory: Not using accessory muscles. Good inspiratory effort. Clear to auscultation bilaterally, no wheeze or crackles. GI: Abdomen soft and obese, no tenderness, not distended, normal bowel sounds  Musculoskeletal: No cyanosis in digits, neck supple  Neurology: speech clear ,  Unable to move right leg which appears to be contracted. Moves bilat upper arms with no deficits . Power in left leg is 1/5  Psych: anxious afect at intervals re oriented to surroundings . Skin: Warm, dry, normal turgor    Labs and Tests:  CBC:   Recent Labs     04/26/22  1219 04/26/22  1536 04/27/22  0524 04/28/22  0528 04/29/22  0711   WBC 19.2*  --   --  10.0 9.6   HGB 7.1*   < > 7.0* 6.9* 7.1*   *  --   --  444 446    < > = values in this interval not displayed.      BMP:    Recent Labs     04/27/22  0524 04/28/22  0528 04/29/22  0711   * 133* 137   K 4.2 4.6 4.3   CL 89* 88* 96*   CO2 24 24 26   BUN 81* 92* 49* CREATININE 7.6* 8.2* 6.0*   GLUCOSE 87 89 125*     Hepatic:   Recent Labs     04/26/22  1219 04/27/22  0524 04/28/22  0528   AST 7* 8* 8*   ALT 7* 6* 6*   BILITOT 0.4 0.3 0.3   ALKPHOS 107 101 100     Results for Syd Boyd (MRN [id number]) as of 4/29/2022 13:40   Ref. Range 4/28/2022 07:30 4/28/2022 15:30 4/28/2022 21:31 4/29/2022 07:17 4/29/2022 11:15   POC Glucose Latest Ref Range: 70 - 99 mg/dl 102 (H) 87 126 (H) 128 (H) 128 (H)     No radiographic evidence of acute pulmonary disease. INR 1.26    FINDINGS:   There is no evidence of acute fracture.  There is normal alignment of the   tarsometatarsal joints.  No acute joint abnormality.  No focal osseous   lesion. No focal soft tissue abnormality.  Diffuse osteopenia. Problem List  Principal Problem:    Vaginal bleeding  Resolved Problems:    * No resolved hospital problems. *       Assessment & Plan:   1. Vaginal bleeding:  Now resolved. Likely due to elevated INR, which is now at 1.26  She has been evaluated by GYN and has refused exam.  Pelvic US was unable to visualize   2. 1/2 BC with staph coag negative. repeat BC pending  3. Acute on Chronic anemia :  Likely due to vaginal bleeding and anemia of chronic disease:  Hgb was 8.9 in February 2022 ,s/p 1 units PRBC in dialysis on Thursday, however Hgb at only 7.1  Hematology consult was placed by nephrology   4. AF:  Rate controlled. coumadin resumed 4/28/22  5. Previous stroke:  Pt is long term resident at [facility]  6. ESRD: on HD q Tue, Thurs, Sat, nephrology is managing   7. SILVER/ previous trach  Appreciate all consults  Anticipate eventual return to [facility] where she is a long term resident       Diet: ADULT DIET;  Regular; 4 carb choices (60 gm/meal)  Code:Full Code  DVT PPX      ITALO Garcia - CNP   4/29/2022 8:35 AM

## 2022-04-29 NOTE — CONSULTS
[facility]   CONSULT NOTE    4/29/2022 3:02 PM    Patient Information: Shelby Monroy   Date of Admit:  4/26/2022  Primary Care Physician:  Rashida Hilario MD  Requesting Physician:  Juliet Yu MD    Reason for consult:   Evaluation and recommendations for anemia. Chief complaint:  No chief complaint on file. History of Present Illness:  Shelby Monroy is a 80 y.o. female on Juliet Yu MD service who was admitted on 4/26/2022 for vaginal bleeding x 1 day. She has a  past medical history of end-stage renal disease, GERD, obstructive sleep apnea with trach, atrial fibrillation on Coumadin, type 2 diabetes, pulmonary hypertension, and history of CVA. Her INR was noted to be elevated at 4.8 in the ED. She was anemic on admission with Hgb 7.1. She dropped to 6.9 requiring 1 unit pRBC transfusion with Hgb improved only to 7.1 today. Her vaginal bleeding has resolved. She refused exam by GYN. Pelvis US was done today, but was unable to visualize. Her coumadin was resumed on 4/28/2022. She denies any chest pain or shortness of breath on exam. She denies any bleeding. She states that she lives in a nursing home. She does not know how long she has been on dialysis.         Past Medical History:     has a past medical history of Acute and chronic respiratory failure (acute-on-chronic) (HCC), Acute blood loss anemia, Acute deep vein thrombosis (DVT) of brachial vein of left upper extremity ([address] Anxiety disorder, Atherosclerotic heart disease of native coronary artery without angina pectoris, Bleeding hemorrhoids, Cerebellar infarct ([address] Cerebral infarction ([address] Chronic pain syndrome, Dependence on renal dialysis ([address] Dependence on respirator ([address] End stage renal disease ([address] Gastro-esophageal reflux disease with esophagitis, without bleeding, Insomnia, Major depressive disorder, recurrent ([address] Morbid obesity due to excess calories ([address] Muscle weakness, Obstructive sleep apnea (adult) (pediatric), Other hyperlipidemia, Other voice and resonance disorders, Personal history of COVID-19, Pulmonary hypertension ([facility] Tracheostomy status ([facility] Type 2 diabetes mellitus without complications ([facility] and Unspecified atrial fibrillation ([facility] Past Surgical History:    No past surgical history on file. Current Medications:     warfarin  3 mg Oral Daily    sodium chloride flush  10 mL IntraVENous 2 times per day    atorvastatin  80 mg Oral Nightly    dextromethorphan-guaiFENesin  1 tablet Oral BID    docusate sodium  100 mg Oral BID    insulin glargine  10 Units SubCUTAneous Daily    QUEtiapine  12.5 mg Oral Nightly    sertraline  50 mg Oral Daily    pantoprazole  40 mg Oral QAM AC    insulin lispro  0-6 Units SubCUTAneous TID WC    insulin lispro  0-3 Units SubCUTAneous Nightly    ipratropium-albuterol  1 vial Inhalation Q4H WA       Allergies: Allergies   Allergen Reactions    Haloperidol Lactate      Other reaction(s): Unknown (See Comments)  PT DOESN'T REMEMBER  Other reaction(s): Unknown (See Comments)  PT DOESN'T REMEMBER      Ziprasidone Hcl      Other reaction(s): Other (See Comments), Unknown (See Comments)  PT DOESN'T REMEMBER  PT DOESN'T REMEMBER  Other reaction(s): Unknown (See Comments)  PT DOESN'T REMEMBER          Social History:    reports that she has quit smoking. She has never used smokeless tobacco. She reports that she does not drink alcohol and does not use drugs. Family History:     family history is not on file.      ROS:      Per HPI; otherwise 10 point ROS is negative    PHYSICAL EXAM:    Vitals:  Vitals:    04/29/22 1207   BP:    Pulse:    Resp: 18   Temp:    SpO2: 100%        Intake/Output Summary (Last 24 hours) at 4/29/2022 1502  Last data filed at 4/29/2022 0301  Gross per 24 hour   Intake 440 ml   Output --   Net 440 ml      Wt Readings from Last 3 Encounters:   04/29/22 (!) 318 lb 8 oz (144.5 kg)   04/26/22 291 lb (132 kg)   02/21/22 291 lb 11.2 oz (132.3 kg)        General appearance: Appears chronically ill. Obese. Eyes: Sclera clear, pupils equal  ENT: Moist mucus membranes, no thrush  Neck: Trach in place. Cardiovascular: Regular rhythm, normal S1, S2. No murmur, gallop, rub. Respiratory: Clear to auscultation bilaterally. No wheeze. Gastrointestinal: Abdomen soft, not tender, not distended, normal bowel sounds  Musculoskeletal: No cyanosis in digits, warm extremities  Neurologic: Cranial nerves grossly intact, no motor or speech deficits. Psychiatric: Normal affect. Alert and oriented to time, place and person. Skin: Warm, dry, normal turgor, no rash    DATA:  CBC:   Lab Results   Component Value Date    WBC 9.6 04/29/2022    RBC 2.37 04/29/2022    HGB 7.1 04/29/2022    HCT 23.2 04/29/2022    MCV 97.6 04/29/2022    MCH 30.0 04/29/2022    MCHC 30.7 04/29/2022    RDW 19.5 04/29/2022     04/29/2022    MPV 7.8 04/29/2022     BMP:  Lab Results   Component Value Date     04/29/2022    K 4.3 04/29/2022    K 4.6 04/28/2022    CL 96 04/29/2022    CO2 26 04/29/2022    BUN 49 04/29/2022    CREATININE 6.0 04/29/2022    CALCIUM 8.2 04/29/2022    GFRAA 8 04/29/2022    LABGLOM 7 04/29/2022    GLUCOSE 125 04/29/2022     Magnesium: No results found for: MG  LIVER PROFILE:   Recent Labs     04/27/22  0524 04/28/22  0528   AST 8* 8*   ALT 6* 6*   BILITOT 0.3 0.3   ALKPHOS 101 100     PT/INR:    Lab Results   Component Value Date    PROTIME 14.4 04/29/2022    PROTIME 13.6 04/28/2022    PROTIME 15.7 04/27/2022    INR 1.26 04/29/2022    INR 1.19 04/28/2022    INR 1.37 04/27/2022     Lab Results   Component Value Date    IRON 48 04/26/2022    TIBC 129 (L) 04/26/2022    FERRITIN 2,153.0 (H) 04/26/2022       IMPRESSION/RECOMMENDATIONS:    Principal Problem:    Vaginal bleeding  Resolved Problems:    * No resolved hospital problems.  *       56-year-old female with a history of ESRD on HD, atrial fibrillation on Coumadin, DM, hx of CVA, and SILVER with trach in place who has:    1. Anemia  - secondary to acute blood loss with vaginal bleeding and comorbidity  - Hgb 7.1 today, s/p 1 unit pRBC transfusion today  - Iron studies on 4/26/2022 show anemia of chronic disease  - B12 and folate on 4/26/2022 show no evidence of deficiencies  - Will check EPO level, as patient may benefit from KOKO  - Will also check SPEP, JANETTE, QIG, and light chains  - Will check copper and zinc  - transfuse to keep Hgb above 7    2. ESRD on HD  - nephrology following    3. Hx of atrial fibrillation on Coumadin  - coumadin previously being held d/t vaginal bleeding and elevated INR  - Coumadin resumed on 4/28/2022      This plan was discussed with the patient and he/she verbalized understanding. Thank you for allowing us to participate in the care of this patient. ITALO [facility]  [facility], [facility]  [phone]    Patient was seen. Tracheostomy in place. Severe normocytic and normochromic anemia. Iron study c/w chronic diseaes with highly elevated ferritin. No B12 or folate deficiency. On HD for ESRD and likely on KOKO already. Okay to check monoclonal gammopathy. Plan was discussed.      Lisa Aguilera MD

## 2022-04-29 NOTE — PROGRESS NOTES
Shift assessment and AM vitals complete, VSS. AM meds given. No vaginal bleeding, old drainage noted. Pt states RLE is bothering her still, NP aware. Waiting on blood cultures. Linens changed, tho area cleaned, purwik placed. The care plan and education has been reviewed and mutually agreed upon with the patient. Patient remains free from falls or accidental injury. Room free from clutter. All fall precautions in place. Bed in lowest position with wheels locked and alarm on, call light and belongings within reach, and door open.

## 2022-04-29 NOTE — PROGRESS NOTES
Pharmacy to Dose Warfarin    Pharmacy consulted to dose warfarin for afib and ho DVT. INR Goal: 2-3    INR today: 1.26    Assessment/Plan:  - INR increased drom 1.19 to 1.26 after 3 mg of warfarin yesterday. Prior to hospital dosing was 3 mg daily before needing to hold for 6 days.   - Restarting 3 mg daily     Pharmacy will continue to follow.     Jany Blum, PharmD  Pharmacy Resident   [id number]

## 2022-04-29 NOTE — CONSULTS
PALLIATIVE MEDICINE CONSULTATION     Patient name:Jennifer Adam   YGU:[id number]    :1950  Room/Bed:[facility]/4470-01   LOS: 3 days         Date of consult:2022    Consult Information  Palliative Medicine Consult performed by: ITALO Morton CNP, CNP    Inpatient consult to Palliative Care  Consult performed by: ITALO Morton CNP  Consult ordered by: ITALO Pollock CNP  Reason for consult: [address] and code  status           ASSESSMENT/RECOMMENDATIONS     80 y.o. female with Dyspnea and leg pain      Symptom Management:  Dyspnea- pt with trach at baseline breathing is stable today  Leg pain- this is a chronic issue controlled on current regimen. Goals of Care- pt is alert and oriented and has capacity to make her own decisions her daughter Karl Naranjo is her HCPOA. Pt is clear that she wants all aggressive care her goal is to stay alive in any capacity she understands that she is a poor candidate for aggressive life saving measures. Pt remains FULL code. Patient/Family Goals of Care :    pt is alert and oriented and has capacity to make her own decisions her daughter Karl Naranjo is her HCPOA. Pt is clear that she wants all aggressive care her goal is to stay alive in any capacity she understands that she is a poor candidate for aggressive life saving measures. Pt remains FULL code. Disposition/Discharge Plan:   Pending    Advance Directives:  Surrogate Decision Maker: Mara  Code status:  Full Code    Case discussed with: patient, floor RN  Thank you for allowing us to participate in the care of this patient.       HISTORY     CC: Leg pain  HPI: The patient is a 80 y.o. female with significant past medical history of ESRD on maintenance hemodialysis, obstructive sleep apnea with a tracheostomy, atrial fibrillation, type 2 diabetes mellitus, pulmonary hypertension, CVA, GERD, presented to the ED in [facility] with vaginal bleeding of unknown duration. Palliative Medicine SymptomScreening/ROS:    Review of Systems   Constitutional: Positive for appetite change and fatigue. HENT: Positive for drooling and trouble swallowing. Respiratory: Positive for cough, chest tightness and shortness of breath. Musculoskeletal: Positive for arthralgias. Neurological: Positive for weakness. All other systems reviewed and are negative. Palliative Performance Scale:     [] 60%  Amb reduced; Sig dz. Can't do hobbies/housework; Intake normal or reduced, Occasional assist; LOC full/confusion   [] 50%  Mainly sit/lie; Extensive disease. Mainly assist, Intake normal or reduced; Occasional assist; LOC full/confusion   [x] 40%  Mainly in bed; Extensive disease; Mainly assist; Intake normal or reduced; Occasional assist; LOC full/confusion   [] 30%  Bed bound, Extensive disease; Total care; Intake reduced; LOC full/confusion   [] 20%  Bed bound; Extensive disease; Total care; Intake minimal; Drowsy/coma   [] 10%  Bed bound; Extensive disease; Total care; Mouth care only; Drowsy/coma   []  0%   Death       Home med list and hospital medications reviewed in chart as of 4/29/2022     EXAM     Vitals:    04/29/22 0750   BP:    Pulse:    Resp: 18   Temp:    SpO2: 100%       Physical Exam  Constitutional:       General: She is not in acute distress. Appearance: She is ill-appearing. HENT:      Head: Normocephalic and atraumatic. Nose: No congestion. Mouth/Throat:      Mouth: Mucous membranes are dry. Comments: Trach in place  Eyes:      Pupils: Pupils are equal, round, and reactive to light. Cardiovascular:      Rate and Rhythm: Normal rate. Heart sounds: No murmur heard. No friction rub. No gallop. Pulmonary:      Effort: No respiratory distress. Abdominal:      General: There is distension. Palpations: Abdomen is soft. Musculoskeletal:      Cervical back: Normal range of motion. Right lower leg: Edema present. Left lower leg: Edema present. Skin:     General: Skin is warm and dry. Findings: Bruising present. Neurological:      General: No focal deficit present. Mental Status: She is alert. Motor: Weakness present. Psychiatric:         Mood and Affect: Mood normal.         Behavior: Behavior normal.         Thought Content:  Thought content normal.         Judgment: Judgment normal.                Current labs in the epic chart reviewed as of 4/29/2022   Review of previous notes, admits, labs, radiology and testing relevant to this consult done in this chart today 4/29/2022      Total time: 70 minutes  >50% of time spent counseling patient at bedside or POA/family member if applicable , reviewing information and discussing care, coordinating with care team  Signed By: Electronically signed by ITALO Cooper CNP on 4/29/2022 at [address] [facility]   [phone]    April 29, 2022

## 2022-04-29 NOTE — PROGRESS NOTES
Shift assessment complete. VSS. Medications reviewed then administered to patient. Neuro WNL. Medicated for c/o right foot pain. BLEs elevated. Complete bed bath given & linens changed. No vaginal bleeding present. Repositioned. The care plan and education has been reviewed and mutually agreed upon with the patient. Fall precautions in place and call light within reach.

## 2022-04-29 NOTE — PROGRESS NOTES
[facility]   Nephrology progress Note  [phone]   [facility]. [place]           Reason for Consult:  ESRD on HD  The patient is a 80 y.o. female with significant past medical history of ESRD on maintenance hemodialysis Tuesday Thursday Saturday at [place], obstructive sleep apnea with a tracheostomy, atrial fibrillation, type 2 diabetes mellitus, pulmonary hypertension, CVA, GERD, presented to the ED in [facility] with vaginal bleeding of unknown duration. She says she has been on hemodialysis for about 5 years and the and the main reason her kidneys failed was diabetes mellitus type 2  I asked her about foul discharge per vagina she says negative, she says the bleeding is more or less stopped  There is no fever      Interval  History:      4/28/22:Seen on hemodialysis  She apparently refused a pelvic exam  Getting 1 unit of blood on dialysis for hemoglobin less than 7 at 6.9  Complains of pain all over just received pain medication  4/29/22: Not much improvement in hemoglobin in spite of 1 unit of blood, no overt bleeding, will consult hematology      PHYSICAL EXAM:    Vitals:    /71   Pulse 81   Temp 97.7 °F (36.5 °C) (Axillary)   Resp 18   Ht 5' 4\" (1.626 m)   Wt (!) 318 lb 8 oz (144.5 kg)   SpO2 100%   BMI 54.67 kg/m²   I/O last 3 completed shifts: In: 5843 [P.O.:550; I.V.:10; Blood:52; IV Piggyback:250]  Out: 2700   No intake/output data recorded. Physical Exam:  Gen:  alert, oriented x 3, obese  PERRL , EOM +  Pallor +, No icterus  JVP not raised , tracheostomy  CV: RRR no murmur or rub . Lungs:B/ L air entry, Normal breath sounds   Abd: soft, bowel sounds + , No organomegaly   Ext: No edema, no cyanosis  Skin: Warm.   No rash  Left upper arm AV graft with good thrill  Neuro: nonfocal.      DATA:    CBC with Differential:    Lab Results   Component Value Date    WBC 9.6 04/29/2022    RBC 2.37 04/29/2022    HGB 7.1 04/29/2022    HCT 23.2 04/29/2022     04/29/2022    MCV 97.6 04/29/2022    MCH 30.0 04/29/2022    MCHC 30.7 04/29/2022    RDW 19.5 04/29/2022    BANDSPCT 11 04/29/2022    LYMPHOPCT 11.0 04/29/2022    MONOPCT 5.0 04/29/2022    MYELOPCT 2 04/29/2022    BASOPCT 0.0 04/29/2022    MONOSABS 0.5 04/29/2022    LYMPHSABS 1.1 04/29/2022    EOSABS 0.2 04/29/2022    BASOSABS 0.0 04/29/2022     CMP:    Lab Results   Component Value Date     04/29/2022    K 4.3 04/29/2022    K 4.6 04/28/2022    CL 96 04/29/2022    CO2 26 04/29/2022    BUN 49 04/29/2022    CREATININE 6.0 04/29/2022    GFRAA 8 04/29/2022    AGRATIO 0.6 04/28/2022    LABGLOM 7 04/29/2022    GLUCOSE 125 04/29/2022    PROT 6.8 04/28/2022    LABALBU 2.8 04/29/2022    CALCIUM 8.2 04/29/2022    BILITOT 0.3 04/28/2022    ALKPHOS 100 04/28/2022    AST 8 04/28/2022    ALT 6 04/28/2022     Magnesium:  No results found for: MG  Phosphorus:    Lab Results   Component Value Date    PHOS 4.6 04/29/2022     PT/INR:    Lab Results   Component Value Date    PROTIME 14.4 04/29/2022    INR 1.26 04/29/2022     Troponin:    Lab Results   Component Value Date    TROPONINI 0.37 02/17/2022     U/A:  No results found for: NITRITE, COLORU, PROTEINU, PHUR, LABCAST, WBCUA, RBCUA, MUCUS, TRICHOMONAS, YEAST, BACTERIA, CLARITYU, SPECGRAV, LEUKOCYTESUR, UROBILINOGEN, BILIRUBINUR, BLOODU, GLUCOSEU, AMORPHOUS        IMPRESSION/RECOMMENDATIONS:      1. ESRD on HD: Does HD at [facility] Tuesday Thursday Saturday, there is no evidence of fluid overload or hyperkalemia hence will do dialysis as scheduled day Thursday, 4/28/2022    2. Vaginal Bleeding    3. Anemia : Hemoglobin 7.1       Got unit of blood 4/28/22, consult hematology    4. Renal osteodystrophy: Calcium 8.2, phosphorus 4.6    Thank you for allowing me to participate in the care of this patient. I will continue to follow along. Please call with questions.     Daija Koenig MD, MD  4/29/2022  [facility]

## 2022-04-29 NOTE — CARE COORDINATION
Discharge Planning Note:      Anticipating discharge to [address] on Saturday or Sunday. If discharging on Saturday the patient will need to have dialysis before being discharged. Talked with [address] [place] [address] [phone] on 04/27/2022     - Patient is form [place] [id number]. and is able to return upon discharge.     - If patient needs SNF, will need a pre-cert.     - Patient has a existing Trach.     - Patient has dialysis: Tuesday, Thursday, Saturday    Will continue to follow.     Doneen Hashimoto, BSN RN    [facility]  Phone: [phone]

## 2022-04-29 NOTE — PLAN OF CARE
Problem: Pain  Goal: Verbalizes/displays adequate comfort level or baseline comfort level  Outcome: Not Progressing     Problem: ABCDS Injury Assessment  Goal: Absence of physical injury  Outcome: Progressing

## 2022-04-29 NOTE — PROGRESS NOTES
Trach care done. DIC and drain gauze changed. Site was cleansed and dried. Passy manolo speaking valve replaced. Water changed and drain bag emptied on cool mist aerosol.

## 2022-04-29 NOTE — DISCHARGE INSTR - COC
Continuity of Care Form    Patient Name: Nehemias Otero   :    MRN:  [id number]    Admit date:  2022  Discharge date:  2022    Code Status Order: Full Code   Advance Directives:      Admitting Physician:  Mouna Milner DO  PCP: Medina Bowman MD    Discharging Nurse: All Ruiz Unit/Room#: [facility]  Discharging Unit Phone Number: [phone]    Emergency Contact:   Extended Emergency Contact Information  Primary Emergency Contact: [address] Phone: [phone]  Relation: Child    Past Surgical History:  No past surgical history on file. Immunization History: There is no immunization history on file for this patient.     Active Problems:  Patient Active Problem List   Diagnosis Code    Unresponsive episode R41.89    Syncope R55    Acute respiratory failure with hypoxia and hypercapnia (HCC) J96.01, J96.02    Pneumonia due to infectious organism J18.9    ESRD on dialysis ([facility] N18.6, Z99.2    Chronic respiratory failure requiring continuous mechanical ventilation through tracheostomy ([facility] J96.10, Z93.0, Z99.11    Acute on chronic respiratory failure with hypoxia ([facility]) J96.21    Acute pulmonary edema ([facility]) J81.0    Rectal pain K62.89    History of CVA (cerebrovascular accident) Z86.73    HCAP (healthcare-associated pneumonia) J18.9    Anemia, chronic disease D63.8    Anxiety F41.9    Chronic diastolic heart failure ([facility]) I50.32    Dysphagia, pharyngeal phase R13.13    Essential hypertension I10    GERD (gastroesophageal reflux disease) K21.9    Morbid obesity ([facility]) E66.01    SILVER (obstructive sleep apnea) G47.33    Abnormal chest x-ray R93.89    Vaginal bleeding N93.9       Isolation/Infection:   Isolation            Contact          Patient Infection Status       Infection Onset Added Last Indicated Last Indicated By Review Planned Expiration Resolved Resolved By    MDRO (multi-drug resistant organism) 22 Culture, Respiratory        MRSA 21 Anselmo Matos RN        Added from external infection. Resolved    COVID-19 (Rule Out) 22 COVID-19 & Influenza Combo (Ordered)   22 Ronald Dudley RN    COVID-19 (Rule Out) 22 COVID-19, Rapid (Ordered)   22 Rule-Out Test Resulted    C-diff Rule Out 22 Clostridium difficile toxin/antigen (Ordered)   22 Anselmo Matos RN    Order . COVID-19 (Rule Out) 22 COVID-19 & Influenza Combo (Ordered)   22 Rule-Out Test Resulted    COVID-19 (Rule Out) 01/10/22 01/10/22 01/11/22 COVID-19 & Influenza Combo (Ordered)   22 Rule-Out Test Resulted            Nurse Assessment:  Last Vital Signs: /71   Pulse 81   Temp 97.7 °F (36.5 °C) (Axillary)   Resp 18   Ht 5' 4\" (1.626 m)   Wt (!) 318 lb 8 oz (144.5 kg)   SpO2 99%   BMI 54.67 kg/m²     Last documented pain score (0-10 scale): Pain Level: 4  Last Weight:   Wt Readings from Last 1 Encounters:   22 (!) 318 lb 8 oz (144.5 kg)     Mental Status:  oriented, alert, thought processes intact, and able to concentrate and follow conversation    IV Access:  Right upper arm A/V fistula    Nursing Mobility/ADLs:  Walking   Dependent  Transfer  Dependent  Bathing  Dependent  Dressing  Dependent  Toileting  Dependent  Feeding  Independent  Med Admin  Assisted  Med Delivery   whole    Wound Care Documentation and Therapy:  Wound 22 Thigh Proximal;Right;Posterior (Active)   Wound Etiology Skin Tear 22   Dressing Status New dressing applied 22   Wound Cleansed Soap and water 22   Dressing/Treatment Foam 22   Drainage Amount None 22   Odor None 22   Viviana-wound Assessment Fragile 22   Number of days: 2        Elimination:  Continence:    Bowel: No  Bladder: anuric  Urinary Catheter: None   Colostomy/Ileostomy/Ileal Conduit: No       Date of Last BM: 05/30/2022    Intake/Output Summary (Last 24 hours) at 4/29/2022 0757  Last data filed at 4/29/2022 0301  Gross per 24 hour   Intake 1562 ml   Output 2700 ml   Net -1138 ml     I/O last 3 completed shifts: In: 9204 [P.O.:550; I.V.:10; Blood:52; IV Piggyback:250]  Out: 2700     Safety Concerns: At Risk for Falls and Aspiration Risk    Impairments/Disabilities:      Speech    Nutrition Therapy:  Current Nutrition Therapy:   - Oral Diet:  Carb Control 4 carbs/meal (1800kcals/day)    Routes of Feeding: Oral  Liquids: Thin Liquids  Daily Fluid Restriction: no  Last Modified Barium Swallow with Video (Video Swallowing Test): not done    Treatments at the Time of Hospital Discharge:   Respiratory Treatments: Duoneb Q4H  Oxygen Therapy:   Trach collar, 28% FiO2 , Trach: Shiley 6 XL.   Ventilator:    - No ventilator support    Rehab Therapies: Physical Therapy, Occupational Therapy, and Speech/Language Therapy  Weight Bearing Status/Restrictions: No weight bearing restrictions  Other Medical Equipment (for information only, NOT a DME order):  hospital bed  Other Treatments: Speaking valve, Pt has lots of secretions, may need to thin with saline when suction    Patient's personal belongings (please select all that are sent with patient):  None    RN SIGNATURE:  Electronically signed by Yousuf Tao RN on 5/1/2022 at 11:02 AM      CASE MANAGEMENT/SOCIAL WORK SECTION    Inpatient Status Date: 04/26/2022    Readmission Risk Assessment Score: 30  Readmission Risk              Risk of Unplanned Readmission:  36           Discharging to Facility/ [facility] [address] 64 Age   [place], [facility]  Phone: [phone]   Fax: [phone]      / signature:   Electronically signed by Abdoul Rogers RN on 4/30/2022 at 1:09 PM      PHYSICIAN SECTION    Prognosis: Fair    Condition at Discharge: Stable    Rehab Potential (if transferring to Rehab): Poor    Recommended Labs or Other Treatments After Discharge: check CBC q tues with HD. Also check INR on Tuesday Thursday and Saturday  Goal INR is between 2 and 3. Please titrate Coumadin dose based on INR  Please do INR 3 times a week and dose Coumadin to keep INR between 2 and 3  Also repeat hemoglobin on May 3 if hemoglobin is less than 7 can be given 1 unit of blood transfusion with hemodialysis    Physician Certification: I certify the above information and transfer of Simeon Swanson  is necessary for the continuing treatment of the diagnosis listed and that she requires [place] for > 30 days.      Update Admission H&P: No change in H&P    PHYSICIAN SIGNATURE:  Electronically signed by ITALO Collins CNP on 4/29/22 at 7:58 AM EDT

## 2022-04-30 ENCOUNTER — APPOINTMENT (OUTPATIENT)
Dept: GENERAL RADIOLOGY | Age: 72
DRG: 813 | End: 2022-04-30
Attending: INTERNAL MEDICINE
Payer: COMMERCIAL

## 2022-04-30 LAB
ALBUMIN SERPL-MCNC: 2.8 G/DL (ref 3.4–5)
ANION GAP SERPL CALCULATED.3IONS-SCNC: 17 MMOL/L (ref 3–16)
ANION GAP SERPL CALCULATED.3IONS-SCNC: 18 MMOL/L (ref 3–16)
BASE EXCESS ARTERIAL: 0 (ref -3–3)
BLOOD CULTURE, ROUTINE: NORMAL
BUN BLDV-MCNC: 39 MG/DL (ref 7–20)
BUN BLDV-MCNC: 58 MG/DL (ref 7–20)
CALCIUM SERPL-MCNC: 8.3 MG/DL (ref 8.3–10.6)
CALCIUM SERPL-MCNC: 8.6 MG/DL (ref 8.3–10.6)
CHLORIDE BLD-SCNC: 94 MMOL/L (ref 99–110)
CHLORIDE BLD-SCNC: 98 MMOL/L (ref 99–110)
CO2: 23 MMOL/L (ref 21–32)
CO2: 24 MMOL/L (ref 21–32)
CREAT SERPL-MCNC: 4.8 MG/DL (ref 0.6–1.2)
CREAT SERPL-MCNC: 6.9 MG/DL (ref 0.6–1.2)
CULTURE, BLOOD 2: ABNORMAL
CULTURE, BLOOD 2: ABNORMAL
GFR AFRICAN AMERICAN: 11
GFR AFRICAN AMERICAN: 7
GFR NON-AFRICAN AMERICAN: 6
GFR NON-AFRICAN AMERICAN: 9
GLUCOSE BLD-MCNC: 101 MG/DL (ref 70–99)
GLUCOSE BLD-MCNC: 104 MG/DL (ref 70–99)
GLUCOSE BLD-MCNC: 129 MG/DL (ref 70–99)
GLUCOSE BLD-MCNC: 142 MG/DL (ref 70–99)
GLUCOSE BLD-MCNC: 183 MG/DL (ref 70–99)
GLUCOSE BLD-MCNC: 95 MG/DL (ref 70–99)
GLUCOSE BLD-MCNC: 97 MG/DL (ref 70–99)
HCO3 ARTERIAL: 24.7 MMOL/L (ref 21–29)
HCT VFR BLD CALC: 24.1 % (ref 36–48)
HEMOGLOBIN: 7.2 G/DL (ref 12–16)
INR BLD: 1.22 (ref 0.88–1.12)
KAPPA, FREE LIGHT CHAINS, SERUM: 516 MG/L (ref 3.3–19.4)
KAPPA/LAMBDA RATIO: 1.35 (ref 0.26–1.65)
KAPPA/LAMBDA TEST COMMENT: ABNORMAL
LACTIC ACID: 1.3 MMOL/L (ref 0.4–2)
LAMBDA, FREE LIGHT CHAINS, SERUM: 381.91 MG/L (ref 5.71–26.3)
MCH RBC QN AUTO: 29.7 PG (ref 26–34)
MCHC RBC AUTO-ENTMCNC: 30 G/DL (ref 31–36)
MCV RBC AUTO: 98.8 FL (ref 80–100)
O2 SAT, ARTERIAL: 88 % (ref 93–100)
ORGANISM: ABNORMAL
ORGANISM: ABNORMAL
PCO2 ARTERIAL: 39.9 MM HG (ref 35–45)
PDW BLD-RTO: 19.6 % (ref 12.4–15.4)
PERFORMED ON: ABNORMAL
PERFORMED ON: NORMAL
PERFORMED ON: NORMAL
PH ARTERIAL: 7.4 (ref 7.35–7.45)
PHOSPHORUS: 5.2 MG/DL (ref 2.5–4.9)
PLATELET # BLD: 475 K/UL (ref 135–450)
PMV BLD AUTO: 7.2 FL (ref 5–10.5)
PO2 ARTERIAL: 55.1 MM HG (ref 75–108)
POC SAMPLE TYPE: ABNORMAL
POTASSIUM REFLEX MAGNESIUM: 4 MMOL/L (ref 3.5–5.1)
POTASSIUM SERPL-SCNC: 5 MMOL/L (ref 3.5–5.1)
PROTHROMBIN TIME: 13.9 SEC (ref 9.9–12.7)
RBC # BLD: 2.44 M/UL (ref 4–5.2)
SODIUM BLD-SCNC: 135 MMOL/L (ref 136–145)
SODIUM BLD-SCNC: 139 MMOL/L (ref 136–145)
TCO2 ARTERIAL: 26 MMOL/L
TROPONIN: 0.22 NG/ML
WBC # BLD: 9.6 K/UL (ref 4–11)

## 2022-04-30 PROCEDURE — 94761 N-INVAS EAR/PLS OXIMETRY MLT: CPT

## 2022-04-30 PROCEDURE — 80069 RENAL FUNCTION PANEL: CPT

## 2022-04-30 PROCEDURE — 6370000000 HC RX 637 (ALT 250 FOR IP): Performed by: INTERNAL MEDICINE

## 2022-04-30 PROCEDURE — 82947 ASSAY GLUCOSE BLOOD QUANT: CPT

## 2022-04-30 PROCEDURE — 2580000003 HC RX 258: Performed by: INTERNAL MEDICINE

## 2022-04-30 PROCEDURE — 85027 COMPLETE CBC AUTOMATED: CPT

## 2022-04-30 PROCEDURE — 2700000000 HC OXYGEN THERAPY PER DAY

## 2022-04-30 PROCEDURE — 6360000002 HC RX W HCPCS: Performed by: NURSE PRACTITIONER

## 2022-04-30 PROCEDURE — 84484 ASSAY OF TROPONIN QUANT: CPT

## 2022-04-30 PROCEDURE — 6360000002 HC RX W HCPCS: Performed by: INTERNAL MEDICINE

## 2022-04-30 PROCEDURE — 71045 X-RAY EXAM CHEST 1 VIEW: CPT

## 2022-04-30 PROCEDURE — 85610 PROTHROMBIN TIME: CPT

## 2022-04-30 PROCEDURE — 82803 BLOOD GASES ANY COMBINATION: CPT

## 2022-04-30 PROCEDURE — 99223 1ST HOSP IP/OBS HIGH 75: CPT | Performed by: INTERNAL MEDICINE

## 2022-04-30 PROCEDURE — 2140000000 HC CCU INTERMEDIATE R&B

## 2022-04-30 PROCEDURE — 83605 ASSAY OF LACTIC ACID: CPT

## 2022-04-30 PROCEDURE — 85025 COMPLETE CBC W/AUTO DIFF WBC: CPT

## 2022-04-30 PROCEDURE — 94640 AIRWAY INHALATION TREATMENT: CPT

## 2022-04-30 PROCEDURE — 36415 COLL VENOUS BLD VENIPUNCTURE: CPT

## 2022-04-30 PROCEDURE — 31502 CHANGE OF WINDPIPE AIRWAY: CPT

## 2022-04-30 PROCEDURE — 93005 ELECTROCARDIOGRAM TRACING: CPT | Performed by: INTERNAL MEDICINE

## 2022-04-30 RX ORDER — WARFARIN SODIUM 3 MG/1
3 TABLET ORAL NIGHTLY
Qty: 30 TABLET | Refills: 2 | Status: SHIPPED | OUTPATIENT
Start: 2022-04-30 | End: 2022-09-19

## 2022-04-30 RX ORDER — HYDROCODONE BITARTRATE AND ACETAMINOPHEN 10; 325 MG/1; MG/1
1 TABLET ORAL 2 TIMES DAILY
Qty: 12 TABLET | Refills: 0 | Status: SHIPPED | OUTPATIENT
Start: 2022-04-30 | End: 2022-05-03

## 2022-04-30 RX ADMIN — GUAIFENESIN AND DEXTROMETHORPHAN HYDROBROMIDE 1 TABLET: 600; 30 TABLET, EXTENDED RELEASE ORAL at 22:14

## 2022-04-30 RX ADMIN — IPRATROPIUM BROMIDE AND ALBUTEROL SULFATE 3 ML: .5; 3 SOLUTION RESPIRATORY (INHALATION) at 10:10

## 2022-04-30 RX ADMIN — INSULIN GLARGINE 10 UNITS: 100 INJECTION, SOLUTION SUBCUTANEOUS at 12:46

## 2022-04-30 RX ADMIN — IPRATROPIUM BROMIDE AND ALBUTEROL SULFATE 3 ML: .5; 3 SOLUTION RESPIRATORY (INHALATION) at 12:57

## 2022-04-30 RX ADMIN — IPRATROPIUM BROMIDE AND ALBUTEROL SULFATE 3 ML: .5; 3 SOLUTION RESPIRATORY (INHALATION) at 16:53

## 2022-04-30 RX ADMIN — DOCUSATE SODIUM 100 MG: 100 CAPSULE, LIQUID FILLED ORAL at 12:46

## 2022-04-30 RX ADMIN — SERTRALINE 50 MG: 50 TABLET, FILM COATED ORAL at 12:46

## 2022-04-30 RX ADMIN — QUETIAPINE FUMARATE 12.5 MG: 25 TABLET ORAL at 21:04

## 2022-04-30 RX ADMIN — PANTOPRAZOLE SODIUM 40 MG: 40 TABLET, DELAYED RELEASE ORAL at 05:43

## 2022-04-30 RX ADMIN — Medication 10 ML: at 08:21

## 2022-04-30 RX ADMIN — DIPHENHYDRAMINE HYDROCHLORIDE 25 MG: 50 INJECTION, SOLUTION INTRAMUSCULAR; INTRAVENOUS at 13:16

## 2022-04-30 RX ADMIN — IPRATROPIUM BROMIDE AND ALBUTEROL SULFATE 3 ML: .5; 3 SOLUTION RESPIRATORY (INHALATION) at 20:22

## 2022-04-30 RX ADMIN — Medication 10 ML: at 21:14

## 2022-04-30 RX ADMIN — MORPHINE SULFATE 2 MG: 2 INJECTION, SOLUTION INTRAMUSCULAR; INTRAVENOUS at 08:35

## 2022-04-30 RX ADMIN — GUAIFENESIN AND DEXTROMETHORPHAN HYDROBROMIDE 1 TABLET: 600; 30 TABLET, EXTENDED RELEASE ORAL at 12:46

## 2022-04-30 RX ADMIN — ATORVASTATIN CALCIUM 80 MG: 80 TABLET, FILM COATED ORAL at 21:04

## 2022-04-30 RX ADMIN — DOCUSATE SODIUM 100 MG: 100 CAPSULE, LIQUID FILLED ORAL at 21:04

## 2022-04-30 ASSESSMENT — PAIN DESCRIPTION - ORIENTATION: ORIENTATION: RIGHT

## 2022-04-30 ASSESSMENT — PAIN SCALES - GENERAL
PAINLEVEL_OUTOF10: 9
PAINLEVEL_OUTOF10: 5

## 2022-04-30 ASSESSMENT — PAIN DESCRIPTION - DESCRIPTORS: DESCRIPTORS: ACHING

## 2022-04-30 ASSESSMENT — PAIN DESCRIPTION - LOCATION: LOCATION: LEG

## 2022-04-30 NOTE — PLAN OF CARE

## 2022-04-30 NOTE — CARE COORDINATION
Transportation to L[place] at [place] cancelled d/t health status change.   Will reschedule when medically ready    Electronically signed by Levi Staley RN on 4/30/2022 at 2:50 PM

## 2022-04-30 NOTE — PROGRESS NOTES
Shift assessment complete. The care plan and education has been reviewed and mutually agreed upon with the patient. Safety precautions in place. Pt refused dialysis today stating we do not have the pain pills she likes here. Asked pt what medication she is talking about and she could not answer. Had Charge nurse Chilo Woodard go and speak with her, still refusing. 1315- Pt stating she is itching. Benadryl given. Pt states she having a hard time breathing. Could not get O2. Called rapid. Pt became unresponsive. Code blue was called. Started compressions. Respiratory showed up and couldn't get any air through trach. Respiratory replaced trach. Pt becomes responsive and VSS. Chest xrays, CT scan, labs were done and transferred to CVU. Gave report to Coy.        Annmarie Antunez RN

## 2022-04-30 NOTE — PROGRESS NOTES
Rapid response/CODE BLUE note:    Patient was noted to have hypoxemia, cyanosis, initial rapid response was called/followed by SIGRID CONTRERAS, ACLS protocol has been initiated, for 2 minutes, patient did not receive epinephrine, ROSC was obtained in 2 minutes. Stat chest x-ray, EKG, troponin, CBC, BMP, transfer patient to ICU, consult pulmonary. Trach and pain changed, looks like it has mucous plug. Further recommendation per primary team and pulmonary.

## 2022-04-30 NOTE — FLOWSHEET NOTE
04/30/22 0950 04/30/22 1154   Vital Signs   /68 131/66   Temp 96.4 °F (35.8 °C) 96.3 °F (35.7 °C)   Pulse 84 87   Resp 20 20     Treatment time: 2 hours    Net UF: 2 L    Pre weight: 145 kg (bed scale)  Post weight: 143 kg (bed scale)  EDW: 130 kg    Access used: RLA AVF  Access function: No problems    Medications or blood products given: None    Regular outpatient schedule: Do ARREOLA    Summary of response to treatment: Tolerated 2 hour HD tx without difficulty. Refused tx initially, was able to compromise and agreed to 2 hour HD tx. Copy of dialysis treatment record placed in chart, to be scanned into EMR. Report called to Ericka Mena RN.

## 2022-04-30 NOTE — PROGRESS NOTES
[facility]   Nephrology progress Note  [phone]   [facility]. [facility]           Reason for Consult:  ESRD on HD  The patient is a 80 y.o. female with significant past medical history of ESRD on maintenance hemodialysis Tuesday Thursday Saturday at [facility], obstructive sleep apnea with a tracheostomy, atrial fibrillation, type 2 diabetes mellitus, pulmonary hypertension, CVA, GERD, presented to the ED in [facility] with vaginal bleeding of unknown duration. She says she has been on hemodialysis for about 5 years and the and the main reason her kidneys failed was diabetes mellitus type 2  I asked her about foul discharge per vagina she says negative, she says the bleeding is more or less stopped  There is no fever      Interval  History:    Refused HD this am but did agree to 2 hours  Had an apparent cardiac arrest event  Thought to be due to hypoxia and mucus plugging  She is awake and alert on my evaluation     PHYSICAL EXAM:    Vitals:    BP (!) 152/62   Pulse 98   Temp 97.4 °F (36.3 °C) (Temporal)   Resp 23   Ht 5' 4\" (1.626 m)   Wt (!) 315 lb 4.1 oz (143 kg)   SpO2 98%   BMI 54.11 kg/m²   I/O last 3 completed shifts: In: 26 [P.O.:270; I.V.:10]  Out: 0   No intake/output data recorded. Physical Exam:  Gen:  alert, oriented x 3, obese  PERRL , EOM +  Pallor +, No icterus  JVP not raised , tracheostomy  CV: RRR no murmur or rub . Lungs:B/ L air entry, Normal breath sounds   Abd: soft, bowel sounds + , No organomegaly   Ext: No edema, no cyanosis  Skin: Warm.   No rash  Left upper arm AV graft with good thrill  Neuro: nonfocal.      DATA:    CBC with Differential:    Lab Results   Component Value Date    WBC 9.6 04/30/2022    RBC 2.44 04/30/2022    HGB 7.2 04/30/2022    HCT 24.1 04/30/2022     04/30/2022    MCV 98.8 04/30/2022    MCH 29.7 04/30/2022    MCHC 30.0 04/30/2022    RDW 19.6 04/30/2022    BANDSPCT 11 04/29/2022    LYMPHOPCT 11.0 04/29/2022 MONOPCT 5.0 04/29/2022    MYELOPCT 2 04/29/2022    BASOPCT 0.0 04/29/2022    MONOSABS 0.5 04/29/2022    LYMPHSABS 1.1 04/29/2022    EOSABS 0.2 04/29/2022    BASOSABS 0.0 04/29/2022     CMP:    Lab Results   Component Value Date     04/30/2022    K 5.0 04/30/2022    K 4.6 04/28/2022    CL 98 04/30/2022    CO2 23 04/30/2022    BUN 58 04/30/2022    CREATININE 6.9 04/30/2022    GFRAA 7 04/30/2022    AGRATIO 0.6 04/28/2022    LABGLOM 6 04/30/2022    GLUCOSE 101 04/30/2022    PROT 7.5 04/28/2022    LABALBU 2.8 04/30/2022    CALCIUM 8.3 04/30/2022    BILITOT 0.3 04/28/2022    ALKPHOS 100 04/28/2022    AST 8 04/28/2022    ALT 6 04/28/2022     Magnesium:  No results found for: MG  Phosphorus:    Lab Results   Component Value Date    PHOS 5.2 04/30/2022     PT/INR:    Lab Results   Component Value Date    PROTIME 13.9 04/30/2022    INR 1.22 04/30/2022     Troponin:    Lab Results   Component Value Date    TROPONINI 0.37 02/17/2022     U/A:  No results found for: NITRITE, COLORU, PROTEINU, PHUR, LABCAST, WBCUA, RBCUA, MUCUS, TRICHOMONAS, YEAST, BACTERIA, CLARITYU, SPECGRAV, LEUKOCYTESUR, UROBILINOGEN, BILIRUBINUR, BLOODU, GLUCOSEU, AMORPHOUS        IMPRESSION/RECOMMENDATIONS:      1. ESRD on HD: Does HD at [facility] Tuesday Thursday Saturday, there is no evidence of fluid overload. Agreed to couple of hours of dialysis this am. Had a cardiac arrest event due to hypoxia. Seems to be resolved back to baseline at this point     2. Vaginal Bleeding    3. Anemia : Hemoglobin 7.1       Got unit of blood 4/28/22     4. Renal osteodystrophy: Calcium 8.2, phosphorus 4.6    Thank you for allowing me to participate in the care of this patient. I will continue to follow along. Please call with questions.     Karen Meyer MD, MD  4/30/2022  [facility]

## 2022-04-30 NOTE — CONSULTS
Department of Gynecology  Consult Note      Reason for Consult:  Vaginal bleeding  Requesting Physician:  Dr. Shayne Moreland:   Vaginal bleeding    History obtained from patient, family member - daughter, electronic medical record    HISTORY OF PRESENT ILLNESS:     The patient is a 80 y.o. female with significant past medical history of ESRD on dialysis, obstructive sleep apnea with a tracheostomy, AF, AODM, pulmonary HTN,  who presented to [facility] with vaginal bleeding of unknown duration. Initial gyn consult was done on April 27 but bleeding had resolved and pt declined pelvic exam at this time. Pt had a cardiorespiratory arrest requiring some chest compressions and 1 round of epi earlier this afternoon and recovered to her baseline status. Some vaginal bleeding was noted at that time and gyn was re consulted. Pt is alert and oriented. Pt's daughter was at bedside.       Past Medical History:        Diagnosis Date    Acute and chronic respiratory failure (acute-on-chronic) ([facility])     Acute blood loss anemia 7/1/2020    Acute deep vein thrombosis (DVT) of brachial vein of left upper extremity ([address] 2/20/2019    Formatting of this note might be different from the original. Dx February 2019    Anxiety disorder     Atherosclerotic heart disease of native coronary artery without angina pectoris     Bleeding hemorrhoids 6/29/2020    Cerebellar infarct ([address] 9/29/2019    Cerebral infarction ([address]     Chronic pain syndrome     Dependence on renal dialysis ([address]     Dependence on respirator ([facility])     End stage renal disease ([address]     Gastro-esophageal reflux disease with esophagitis, without bleeding     Insomnia     Major depressive disorder, recurrent ([address]     Morbid obesity due to excess calories ([address]     Muscle weakness     Obstructive sleep apnea (adult) (pediatric)     Other hyperlipidemia     Other voice and resonance disorders     Personal history of COVID-19     Pulmonary hypertension (HCC)     Tracheostomy status (HCC)     Type 2 diabetes mellitus without complications ([facility]     Unspecified atrial fibrillation ([facility]      Past Surgical History:    No past surgical history on file.     Past Gynecological History:  CS times 2    meds:  Current Facility-Administered Medications:     diphenhydrAMINE (BENADRYL) injection 25 mg, 25 mg, IntraVENous, Q6H PRN, Jacinta Pearson MD, 25 mg at 04/30/22 1316    0.9 % sodium chloride infusion, , IntraVENous, PRN, Evelin GARCIA Dechristopher, APRN - CNP    morphine (PF) injection 2 mg, 2 mg, IntraVENous, Q2H PRN, Divinea RADHA Dechristopher, APRN - CNP, 2 mg at 04/30/22 0835    sodium chloride flush 0.9 % injection 10 mL, 10 mL, IntraVENous, 2 times per day, Ahmad A Zoya, DO, 10 mL at 04/30/22 0821    sodium chloride flush 0.9 % injection 10 mL, 10 mL, IntraVENous, PRN, Ahmad A Zoya, DO, 10 mL at 04/29/22 1554    0.9 % sodium chloride infusion, , IntraVENous, PRN, Sally Kelley A Zoya, DO    promethazine (PHENERGAN) tablet 12.5 mg, 12.5 mg, Oral, Q6H PRN **OR** ondansetron (ZOFRAN) injection 4 mg, 4 mg, IntraVENous, Q6H PRN, Edwinmad A Zoya, DO, 4 mg at 04/28/22 0234    acetaminophen (TYLENOL) tablet 650 mg, 650 mg, Oral, Q6H PRN, 650 mg at 04/29/22 0604 **OR** acetaminophen (TYLENOL) suppository 650 mg, 650 mg, Rectal, Q6H PRN, Sally Wilkinsjazi, DO    atorvastatin (LIPITOR) tablet 80 mg, 80 mg, Oral, Nightly, Ahmad A Zoya, DO, 80 mg at 04/29/22 2124    dextromethorphan-guaiFENesin (MUCINEX DM)  MG per extended release tablet 1 tablet, 1 tablet, Oral, BID, Harryd A Zoya, DO, 1 tablet at 04/30/22 1246    docusate sodium (COLACE) capsule 100 mg, 100 mg, Oral, BID, Edwinmad A DO Zoya, 100 mg at 04/30/22 1246    insulin glargine (LANTUS) injection vial 10 Units, 10 Units, SubCUTAneous, Daily, Harryd NATALIA Kenny DO, 10 Units at 04/30/22 1246    QUEtiapine (SEROQUEL) tablet 12.5 mg, 12.5 mg, Oral, Nightly, John A DO Zoya, 12.5 mg at 04/29/22 2124    sertraline (ZOLOFT) tablet 50 mg, 50 mg, Oral, Daily, Ahmad NATALIA Velizzi, DO, 50 mg at 04/30/22 1246    pantoprazole (PROTONIX) tablet 40 mg, 40 mg, Oral, QAM AC, Ahmad A Zoya, DO, 40 mg at 04/30/22 0543    glucose (GLUTOSE) 40 % oral gel 15 g, 15 g, Oral, PRN, Haydee Wilkinsjazi, DO    glucagon (rDNA) injection 1 mg, 1 mg, IntraMUSCular, PRN, Iris Leni A Zoya, DO    dextrose 5 % solution, 100 mL/hr, IntraVENous, PRN, Harryd A Zoya, DO    insulin lispro (HUMALOG) injection vial 0-6 Units, 0-6 Units, SubCUTAneous, TID WC, Ahmad A Zoya, DO    insulin lispro (HUMALOG) injection vial 0-3 Units, 0-3 Units, SubCUTAneous, Nightly, Harryd NATALIA Velizzi, DO    labetalol (NORMODYNE;TRANDATE) injection 10 mg, 10 mg, IntraVENous, Q4H PRN, Ahmad A Zoya, DO    dextrose bolus (hypoglycemia) 10% 125 mL, 125 mL, IntraVENous, PRN **OR** dextrose bolus (hypoglycemia) 10% 250 mL, 250 mL, IntraVENous, PRN, Haydee Melissason A Zoya, DO    ipratropium-albuterol (DUONEB) nebulizer solution 1 ampule, 1 ampule, Inhalation, Q4H PRN, Edwinmad A Zoya, DO, 1 ampule at 04/29/22 0300    ipratropium-albuterol (DUONEB) nebulizer solution 3 mL, 1 vial, Inhalation, Q4H WA, Harryd A Zoya, DO, 3 mL at 04/30/22 1257       Allergies:  Haloperidol lactate and Ziprasidone hcl    PHYSICAL EXAM:    Vitals:  BP (!) 152/62   Pulse 98   Temp 97.4 °F (36.3 °C) (Temporal)   Resp 23   Ht 5' 4\" (1.626 m)   Wt (!) 315 lb 4.1 oz (143 kg)   SpO2 98%   BMI 54.11 kg/m²     CONSTITUTIONAL:  awake, alert, cooperative, no apparent distress, and appears stated age  Abd: soft, nontender, no masses palpable  Pelvic: no blood on pad, pt declined pelvic exam/EMB    DATA:  Recent Labs     04/28/22  0528 04/29/22  0711 04/30/22  0459 04/30/22  1445   WBC 10.0 9.6 9.6 16.5*   HGB 6.9* 7.1* 7.2* 7.7*   HCT 22.8* 23.2* 24.1* 25.5*    446 475* 547*     Recent Labs     04/28/22 0528 04/29/22  0711 04/30/22  0459   * 137 139   K 4.6 4.3 5.0   CL 88* 96* 98* CO2 24 26 23   BUN 92* 49* 58*   CREATININE 8.2* 6.0* 6.9*   CALCIUM 8.3 8.2* 8.3   AST 8*  --   --    ALT 6*  --   --        Labs:    CBC:   Lab Results   Component Value Date    WBC 16.5 04/30/2022    RBC 2.64 04/30/2022    HGB 7.7 04/30/2022    HCT 25.5 04/30/2022    MCV 96.4 04/30/2022    RDW 19.1 04/30/2022     04/30/2022       IMPRESSION/RECOMMENDATIONS:      Principal Problem:ESRD on dialysis. Obstructive sleep apnea, AODM, pulmonary HTN, fibroid uterus,       Vaginal bleeding earlier, no active bleeding now    Plan: Pt again declined pelvic exam and EMB at this time. Dr. Morelia Mcnamara was notified of pt's refusal for further evaluation of vaginal bleeding. Will sign off at this time. Please contact us if needed.   Approximately 45 min time spent on reviewing chart, evaluation pt at bedside, and discussing pt care with her primary MD.

## 2022-04-30 NOTE — PROGRESS NOTES
Hospitalist Progress Note      PCP: Shelby Parks MD    Date of Admission: 4/26/2022    Chief Complaint: Cardiac arrest    Hospital Course:   Patient was seen twice post on dialysis when she was feeling well with no complaints no further vaginal bleeding subsequently at around 1 PM SIGRID CONTRERAS was called on the patient on my arrival patient had pulse  On discussing with the nursing briefly seemed like cardiorespiratory arrest required some chest compressions and 1 round of epi with return of circulation  It was noted that patient's trach was difficult to suction once the removed mucous plug we were able to get a pulse and oxygenation improved  On my evaluation patient awake disoriented        Medications:  Reviewed      Exam:    /66   Pulse 87   Temp 96.3 °F (35.7 °C)   Resp 20   Ht 5' 4\" (1.626 m)   Wt (!) 315 lb 4.1 oz (143 kg)   SpO2 99%   BMI 54.11 kg/m²     General appearance: Awake but disoriented HEENT: Pupils equal, round, and reactive to light. Conjunctivae/corneas clear. Neck: Supple, with full range of motion. No jugular venous distention. Trachea midline. Tracheostomy in place  Respiratory:  Normal respiratory effort. Clear to auscultation, bilaterally without RALES/WHEEZES/Rhonchi. Cardiovascular: Regular rate and rhythm with normal S1/S2 without MURMURS, rubs or gallops. Abdomen: Soft, non-tender, non-distended with normal bowel sounds. Musculoskeletal: No clubbing, cyanosis or EDEMA bilaterally. Full range of motion without deformity. Skin: Skin color, texture, turgor normal.  No rashes or lesions. Neurologic:  Neurovascularly intact without any focal sensory/motor deficits.  Cranial nerves: II-XII intact, grossly non-focal.        Labs:   Recent Labs     04/28/22  0528 04/29/22  0711 04/30/22  0459   WBC 10.0 9.6 9.6   HGB 6.9* 7.1* 7.2*   HCT 22.8* 23.2* 24.1*    446 475*     Recent Labs     04/28/22  0528 04/29/22  0711 04/30/22  0459   * 137 139   K 4.6 4.3 5.0   CL 88* 96* 98*   CO2 24 26 23   BUN 92* 49* 58*   CREATININE 8.2* 6.0* 6.9*   CALCIUM 8.3 8.2* 8.3   PHOS  --  4.6 5.2*     Recent Labs     04/28/22  0528   AST 8*   ALT 6*   BILITOT 0.3   ALKPHOS 100     Recent Labs     04/28/22  1358 04/29/22  0711 04/30/22  0459   INR 1.19* 1.26* 1.22*     No results for input(s): CKTOTAL, TROPONINI in the last 72 hours. Urinalysis:    No results found for: [place] Lei, BACTERIA, RBCUA, BLOODU, SPECGRAV, [employer] [place] [address]    Radiology:  US PELVIS COMPLETE   Final Result   Nondiagnostic evaluation. XR ANKLE RIGHT (MIN 3 VIEWS)   Final Result   No acute fracture identified         XR FOOT RIGHT (2 VIEWS)   Final Result   No acute osseous abnormality.          XR CHEST PORTABLE    (Results Pending)   CT CHEST PULMONARY EMBOLISM W CONTRAST    (Results Pending)       Assessment/Plan:    Active Hospital Problems    Diagnosis Date Noted    Vaginal bleeding [N93.9] 04/26/2022     Priority: Medium       Acute Medical Issues Being Addressed:    45-year-old admitted to the hospital with vaginal bleeding    Cardiorespiratory arrest very brief I strongly suspect this is respiratory assessed due to mucous plugging  Tracheostomy changed bedside  Chest x-ray reviewed no overt abnormalities  ABG looks good  1 round of chest compression and epi was given  Transfer to ICU  Discussed with pulmonary critical for possible bronchoscopy  Currently seems to be breathing well on her own  Will get CTPA    Vaginal bleeding  Initially seen by Ti Chambers refused pelvic exam thought due to anticoagulation  Again has had some vaginal bleeding  Will reconsult GYN to see if there is any other etiology    1 of 2 blood cultures with coag negative staph suspect contaminant  Repeat blood cultures negative    Acute on chronic anemia of chronic kidney disease  Secondary to vaginal bleeding  Hemoglobin remained stable  Hemoglobin 7 or below will transfuse    Atrial fibrillation  Rate controlled  Coumadin was restarted given pelvic bleeding we will keep it on hold for now    End-stage renal disease on hemodialysis  Received 2 hours of HD today    Previous CVA    Acute on chronic respiratory failure  S/p trach    Transfer to ICU n     Total critical care time was 40 minutes, excluding separately reportable procedures. Services included in critical care time were chart data review, documentation time, obtaining information from patient, review of nursing notes, and vital sign assessment. There was a high probability of clinically significant life-threatening deterioration in the patient's condition, which required my urgent intervention. DVT Prophylaxis: scd for now   Diet: ADULT DIET;  Regular; 4 carb choices (60 gm/meal)  Code Status: Full Code      Dispo - once acute medical processes have resolved    Val Guo MD

## 2022-04-30 NOTE — PROGRESS NOTES
Performed trach around 2 pm pt was resting comfortably. I walked by the room and pt was not responding to RN. I tried to pass suction catheter without success. Pt was turning blue. I told the RN to call a rapid. I could not get the trach out pt skin was growing around the trach. I finally got it out and replaced with a smaller trach. I checked that patient had a color change on the etco.  Pt was talking again and was moved to CVU.

## 2022-04-30 NOTE — PLAN OF CARE
Problem: Chronic Conditions and Co-morbidities  Goal: Patient's chronic conditions and co-morbidity symptoms are monitored and maintained or improve  Outcome: Progressing        Problem: Pain  Goal: Verbalizes/displays adequate comfort level or baseline comfort level  4/30/2022 1627 by Mario Sanabria RN  Outcome: Progressing  Problem: Skin/Tissue Integrity  Goal: Absence of new skin breakdown  Description: 1. Monitor for areas of redness and/or skin breakdown  2. Assess vascular access sites hourly  3. Every 4-6 hours minimum:  Change oxygen saturation probe site  4. Every 4-6 hours:  If on nasal continuous positive airway pressure, respiratory therapy assess nares and determine need for appliance change or resting period.   4/30/2022 1627 by Mario Sanabria RN  Outcome: Progressing  Problem: Safety - Adult  Goal: Free from fall injury  4/30/2022 1627 by Mario Sanabria RN  Outcome: Progressing  Problem: ABCDS Injury Assessment  Goal: Absence of physical injury  4/30/2022 1627 by Mario Sanabria RN  Outcome: Progressing

## 2022-04-30 NOTE — PROGRESS NOTES
Pharmacy to Dose Warfarin    Pharmacy consulted to dose warfarin for afib, hx DVT. INR Goal: 2-3    INR today: 1.22    Assessment/Plan:  - Subtherapeutic at 1.22, likely due to recent vitamin K administration  -Will dose 6mg x1 today. Pharmacy will continue to follow.     Tirso Yuen, LeonaD, BCCCP

## 2022-04-30 NOTE — CONSULTS
PULMONARY AND CRITICAL CARE MEDICINE CONSULTATION NOTE    CONSULTING PHYSICIAN:  Chaitanya Hernandez MD    ADMISSION DATE: 4/26/2022  ADMISSION DIAGNOSIS: Vaginal bleeding [N93.9]    REASON FOR CONSULT: No chief complaint on file. DATE OF CONSULT: 4/26/2022    HISTORY OF PRESENT ILLNESS: 80y.o. year old female with a past medical history significant for chronic respiratory failure on chronic vent, end-stage renal disease on dialysis, obstructive sleep apnea, pulmonary hypertension, history of DVT on Coumadin who presented to [facility] with vaginal bleeding of unknown duration. Patient was transferred to [facility] where she declined pelvic examination. Her anticoagulation was held due to supratherapeutic INR which led to stopping of vaginal bleeding. Patient was supposed to be discharged home today. Earlier today had a respiratory arrest which led to brief cardiac arrest.  Patient required a round of CPR and 1 push of epinephrine before she had ROSC. Her tracheostomy tube was plugged up and this was quickly exchanged with a new Shiley XLT tube. Post tube exchange patient had good oxygenation. At the time of interview patient was lying in bed in no apparent respiratory distress. Fully awake and alert and able to converse. She is requesting for a [place] valve in order to help her speak. Denies any shortness of breath or chest pain. New tracheostomy tube has been suction multiple times and no mucous plugging seen. Patient currently on 9 L/min via trach collar saturating 100% on the monitor. REVIEW OF SYSTEMS:     CONSTITUTIONAL SYMPTOMS: The patient denies fever, fatigue, night sweats, weight loss or weight gain. HEENT: No vision changes. No tinnitus, Denies sinus pain. No hoarseness, or dysphagia. NECK: Patient denies swelling in the neck. CARDIOVASCULAR: Denies chest pain, palpitation, syncope. RESPIRATORY: As per HPI.   GASTROINTESTINAL: Denies nausea, abdominal pain or change in bowel function. GENITOURINARY: Denies obstructive symptoms. No history of incontinence. BREASTS: No masses or lumps in the breasts. SKIN: No rashes or itching. MUSCULOSKELETAL: Denies weakness or bone pain. NEUROLOGICAL: No headaches or seizures. PSYCHIATRIC: Denies mood swings or depression. ENDOCRINE: Denies heat or cold intolerance or excessive thirst.  HEMATOLOGIC/LYMPHATIC: Denies easy bruising or lymph node swelling. ALLERGIC/IMMUNOLOGIC: No environmental allergies. PAST MEDICAL HISTORY:   Past Medical History:   Diagnosis Date    Acute and chronic respiratory failure [facility]acute[facility]on-chronic) [facility])     Acute blood loss anemia 7/1/2020    Acute deep vein thrombosis (DVT) of brachial vein of left upper extremity ([address] 2/20/2019    Formatting of this note might be different from the original. Dx February 2019    Anxiety disorder     Atherosclerotic heart disease of native coronary artery without angina pectoris     Bleeding hemorrhoids 6/29/2020    Cerebellar infarct ([address] 9/29/2019    Cerebral infarction ([address]     Chronic pain syndrome     Dependence on renal dialysis ([address]     Dependence on respirator ([facility])     End stage renal disease ([address]     Gastro-esophageal reflux disease with esophagitis, without bleeding     Insomnia     Major depressive disorder, recurrent ([address]     Morbid obesity due to excess calories ([address]     Muscle weakness     Obstructive sleep apnea (adult) (pediatric)     Other hyperlipidemia     Other voice and resonance disorders     Personal history of COVID-19     Pulmonary hypertension ([address]     Tracheostomy status ([address]     Type 2 diabetes mellitus without complications ([address]     Unspecified atrial fibrillation ([address]        PAST SURGICAL HISTORY: No past surgical history on file.     SOCIAL HISTORY:   Social History     Tobacco Use    Smoking status: Former Smoker    Smokeless tobacco: Never Used   Substance Use Topics    Alcohol use: Never    Drug use: Never       FAMILY HISTORY: No family history on file. MEDICATIONS:     No current facility-administered medications on file prior to encounter. Current Outpatient Medications on File Prior to Encounter   Medication Sig Dispense Refill    hydrocortisone (ANUSOL-HC) 2.5 % CREA rectal cream Place rectally 2 times daily 1 each 0    metoprolol succinate (TOPROL XL) 25 MG extended release tablet Take 0.5 tablets by mouth daily 30 tablet 3    insulin glargine (LANTUS) 100 UNIT/ML injection vial Inject 15 Units into the skin daily      aspirin 81 MG EC tablet Take 81 mg by mouth daily      cyanocobalamin 1000 MCG tablet Take 500 mcg by mouth daily      QUEtiapine (SEROQUEL) 25 MG tablet Take 12.5 mg by mouth nightly      sodium polystyrene (KAYEXALATE) 15 GM/60ML suspension Take 15 g by mouth 4 times daily Sun, Mon, Wed, Fri      calcium acetate 667 MG TABS Take 1,334 mg by mouth 3 times daily (with meals)      hydrocortisone (PREPARATION H) 1 % cream Apply topically 2 times daily Apply topically 2 times daily.       acetaminophen (TYLENOL) 325 MG tablet Take 650 mg by mouth every 6 hours as needed for Pain      atorvastatin (LIPITOR) 80 MG tablet Take 80 mg by mouth at bedtime      diphenhydrAMINE (BENADRYL) 25 MG capsule Take 25 mg by mouth every 8 hours as needed       docusate sodium (COLACE) 100 MG capsule Take 100 mg by mouth 2 times daily      lactulose encephalopathy 10 GM/15ML SOLN solution Take 20 g by mouth daily as needed       polyethylene glycol (GLYCOLAX) 17 g packet Take 17 g by mouth daily as needed for Constipation      insulin NPH (HUMULIN N;NOVOLIN N) 100 UNIT/ML injection vial Inject into the skin every 6 hours       ipratropium-albuterol (DUONEB) 0.5-2.5 (3) MG/3ML SOLN nebulizer solution Inhale 1 vial into the lungs every 4 hours      furosemide (LASIX) 20 MG tablet Take 20 mg by mouth three times a week MWF      loperamide (IMODIUM) 2 MG capsule Take 2 mg by mouth 4 times daily as needed for Diarrhea      midodrine (PROAMATINE) 5 MG tablet Take 10 mg by mouth three times a week Tues thurs sat pre dialysis      dextromethorphan-guaiFENesin (MUCINEX DM)  MG per extended release tablet Take 1 tablet by mouth 2 times daily       omeprazole (PRILOSEC) 20 MG delayed release capsule Take 20 mg by mouth daily      chlorhexidine (PERIDEX) 0.12 % solution Take 15 mLs by mouth 2 times daily      sennosides-docusate sodium (SENOKOT-S) 8.6-50 MG tablet Take 2 tablets by mouth at bedtime       sertraline (ZOLOFT) 50 MG tablet Take 50 mg by mouth daily      terazosin (HYTRIN) 1 MG capsule Take 1 mg by mouth nightly      ondansetron (ZOFRAN) 4 MG tablet Take 4 mg by mouth every 8 hours as needed for Nausea or Vomiting          sodium chloride flush  10 mL IntraVENous 2 times per day    atorvastatin  80 mg Oral Nightly    dextromethorphan-guaiFENesin  1 tablet Oral BID    docusate sodium  100 mg Oral BID    insulin glargine  10 Units SubCUTAneous Daily    QUEtiapine  12.5 mg Oral Nightly    sertraline  50 mg Oral Daily    pantoprazole  40 mg Oral QAM AC    insulin lispro  0-6 Units SubCUTAneous TID WC    insulin lispro  0-3 Units SubCUTAneous Nightly    ipratropium-albuterol  1 vial Inhalation Q4H WA      sodium chloride      sodium chloride      dextrose       diphenhydrAMINE, sodium chloride, morphine, sodium chloride flush, sodium chloride, promethazine **OR** ondansetron, acetaminophen **OR** acetaminophen, glucose, glucagon (rDNA), dextrose, labetalol, dextrose bolus (hypoglycemia) **OR** dextrose bolus (hypoglycemia), ipratropium-albuterol      ALLERGIES:   Allergies as of 04/26/2022 - Fully Reviewed 04/26/2022   Allergen Reaction Noted    Haloperidol lactate  08/02/2018    Ziprasidone hcl  08/02/2018      OBJECTIVE:   height is 5' 4\" (1.626 m) and weight is 315 lb 4.1 oz (143 kg) (abnormal). Her temporal temperature is 97.4 °F (36.3 °C).  Her blood pressure is 152/62 (abnormal) and her pulse is 98. Her respiration is 23 and oxygen saturation is 98%. No intake/output data recorded. PHYSICAL EXAM:    CONSTITUTIONAL: She is a 80y.o.-year-old who appears her stated age. She is alert and oriented x 3 and in no acute distress. HEENT: PERRLA, EOMI. No scleral icterus. No thrush, atraumatic, normocephalic. NECK: Supple, without cervical or supraclavicular lymphadenopathy: Shiley size 6 proximal XLT tube in good position. CARDIOVASCULAR: S1 S2 RRR. Without murmer  RESPIRATORY & CHEST: Lungs are clear to auscultation and percussion. No wheezing, no crackles. Good air movement  GASTROINTESTINAL & ABDOMEN: Soft, nontender, positive bowel sounds in all quadrants, non-distended, without hepatosplenomegaly. GENITOURINARY: Deferred. MUSCULOSKELETAL: No tenderness to palpation of the axial skeleton. There is no clubbing. No cyanosis. No edema of the lower extremities. SKIN OF BODY: No rash or jaundice. PSYCHIATRIC EVALUATION: Normal affect. Patient answers questions appropriately. HEMATOLOGIC/LYMPHATIC/ IMMUNOLOGIC: No palpable lymphadenopathy. NEUROLOGIC: Alert and oriented x 3. Groslly non-focal. Motor strength is 5+/5 in all muscle groups. The patient has a normal sensorium globally. LABS:  Recent Labs     04/28/22  0528 04/28/22  0528 04/28/22  1358 04/29/22  0711 04/30/22  0459 04/30/22  1445   WBC 10.0   < >  --  9.6 9.6 16.5*   HGB 6.9*   < >  --  7.1* 7.2* 7.7*   HCT 22.8*   < >  --  23.2* 24.1* 25.5*      < >  --  446 475* 547*   ALT 6*  --   --   --   --   --    AST 8*  --   --   --   --   --    *   < >  --  137 139 135*   K 4.6  --   --  4.3 5.0 4.0   CL 88*   < >  --  96* 98* 94*   CREATININE 8.2*   < >  --  6.0* 6.9* 4.8*   BUN 92*   < >  --  49* 58* 39*   CO2 24   < >  --  26 23 24   INR  --   --  1.19* 1.26* 1.22*  --     < > = values in this interval not displayed.        Recent Labs     04/29/22  0711 04/30/22  0459 04/30/22  1445   GLUCOSE 125* 101* 142*   CALCIUM 8.2* 8.3 8.6    139 135*   K 4.3 5.0 4.0   CO2 26 23 24   CL 96* 98* 94*   BUN 49* 58* 39*   CREATININE 6.0* 6.9* 4.8*       Recent Labs     04/30/22  1354   PHART 7.399   LNZ1NET 39.9   PO2ART 55.1*   COL7XZM 24.7   [id number] 88*   BEART 0       Lab Results   Component Value Date    INR 1.22 (H) 04/30/2022    INR 1.26 (H) 04/29/2022    INR 1.19 (H) 04/28/2022    PROTIME 13.9 (H) 04/30/2022    PROTIME 14.4 (H) 04/29/2022    PROTIME 13.6 (H) 04/28/2022     No results found for: AMYLASE   Lab Results   Component Value Date    LABA1C 5.4 04/27/2022     Lab Results   Component Value Date    .3 04/27/2022     No results found for: TSH, X5IDLJA, B2PBFQP, THYROIDAB, FT3, T4FREE  Lab Results   Component Value Date    TROPONINI 0.22 (H) 04/30/2022      No results found for: CRP   No results found for: BNP   Lab Results   Component Value Date    DDIMER 235 (H) 01/10/2022      Lab Results   Component Value Date    FERRITIN 2,153.0 (H) 04/26/2022      Lab Results   Component Value Date    LACTA 1.3 04/30/2022           IMAGING:    Narrative   EXAMINATION:   ONE XRAY VIEW OF THE CHEST       4/30/2022 1:47 pm           FINDINGS:   Tracheostomy tube remains in place.       The lungs are without acute focal process.  There is no effusion or   pneumothorax. The cardiomediastinal silhouette is stable. The osseous   structures are stable.           Impression   No acute process.       Bibasilar hypoaeration               IMPRESSION:     1. Brief cardiac arrest most likely precipitated by respiratory distress  2. Chronic respiratory failure on trach/vent  3. Morbid obesity  4. Pulmonary hypertension  5. SILVER  6. End-stage renal disease on hemodialysis  7. History of DVT on chronic anticoagulation  8. Vaginal bleeding      RECOMMENDATION:     1. Patient had a brief cardiac arrest most likely precipitated by respiratory distress due to tracheal plugging.   2. Only required 1 round of CPR and 1 dose of epi. Her mentation is back to normal and she is hemodynamically stable. 3. As soon as the tracheostomy tube was replaced with a new 1, patient had good respiratory status. 4. Repeat chest x-ray shows no focal consolidation. No indications for bronchoscopy at this time. 5. Continue to wean the FiO2 down to maintain SPO2 above 90%. 6. Low suspicion for PE. Can hold off on getting the CT PE.  7. No more vaginal bleeding seen. Patient refusing pelvic examination. Remains off of anticoagulation. 8. Continue to monitor in ICU overnight and if her respiratory status remained stable, she can be discharged from the hospital by tomorrow. 9. I discussed the case with Dr. Henry Leon in detail. Thank you for your consultation. We will sign off. Erasto Hardin MD  Pulmonary Critical Care and Sleep Medicine  4/26/2022, 4:00 PM    This note was completed using dragon medical speech recognition software. Grammatical errors, random word insertions, pronoun errors and incomplete sentences are occasional consequences of this technology due to software limitations. If there are questions or concerns about the content of this note of information contained within the body of this dictation they should be addressed with the provider for clarification.

## 2022-04-30 NOTE — DISCHARGE SUMMARY
Patient: Edwin Peraza     Gender: female  : 1950   Age: 80 y.o. MRN: [id number]    Admitting Physician: Guillermo Kirby DO  Discharge Physician: Steven Hunter MD     Code Status: Full Code     Admit Date: 2022   Discharge Date: 2022    Disposition: Skilled nursing facility    Discharge Diagnoses:  Vaginal bleeding in the context of elevated INR patient refused any further exam  Acute on chronic anemia likely blood loss  Atrial fibrillation rate controlled on chronic anticoagulation  End-stage renal disease on hemodialysis  . Brief respiratory arrest secondary to mucous plugging    Active Hospital Problems    Diagnosis Date Noted    Vaginal bleeding [N93.9] 2022     Priority: Medium       Follow-up appointments:  one week    Outpatient to do list: per travis    Condition at Discharge:  [address] Course:   80year-old admitted to the hospital with vaginal bleeding. This is resolved now  It was due to elevated INR  Coumadin was held. Pelvic ultrasound was nondiagnostic  Patient refused any vaginal exam clearly understood the implications and the risks associated with not having any further testing  Her Coumadin has been resumed INR is only 1.22.   We will continue with Coumadin 3 mg and slowly get up to goal INR between 2 and 3 hemoglobin at 7.1 seen by oncology this is probably due to acute blood loss on discharge she was only up to 7.2 I suspect it may drop over the next few days but she could get a blood transfusion with dialysis she has been doing very well  On the day of potential discharge patient had a brief respiratory arrest at that point it was initially thought that she lost some pulse required 1 round of CPR and epinephrine as soon as the trach was unclogged pulse was obtained she was transferred to ICU pulmonary critical care were consulted chest x-ray looked good her tracheostomy was replaced this was all felt due to respiratory arrest  Patient was monitored for over 24 hours continues to do well  There was some question about recurrent vaginal bleeding OB/GYN will be consulted patient again refused any pelvic examination  On the day of discharge I spoke to the patient at length explained that she is going to be on anticoagulation and there is a high risk that the bleeding may recur. Patient voiced understanding but still does not want a pelvic exam and wants to continue with Coumadin  I have restarted her on Coumadin at 3 mg she will need monitoring of her INR and reducing of Coumadin  If she ever agrees for a pelvic exam she will need follow-up with OB/GYN as she may need a biopsy  Again she is stable      Discharge Medications:   Current Discharge Medication List        Current Discharge Medication List      CONTINUE these medications which have CHANGED    Details   HYDROcodone-acetaminophen (NORCO)  MG per tablet Take 1 tablet by mouth 2 times daily for 3 days.   Qty: 12 tablet, Refills: 0    Comments: Reduce doses taken as pain becomes manageable  Associated Diagnoses: Rectal pain      warfarin (COUMADIN) 3 MG tablet Take 1 tablet by mouth nightly  Qty: 30 tablet, Refills: 2           Current Discharge Medication List      CONTINUE these medications which have NOT CHANGED    Details   hydrocortisone (ANUSOL-HC) 2.5 % CREA rectal cream Place rectally 2 times daily  Qty: 1 each, Refills: 0      metoprolol succinate (TOPROL XL) 25 MG extended release tablet Take 0.5 tablets by mouth daily  Qty: 30 tablet, Refills: 3      insulin glargine (LANTUS) 100 UNIT/ML injection vial Inject 15 Units into the skin daily      aspirin 81 MG EC tablet Take 81 mg by mouth daily      cyanocobalamin 1000 MCG tablet Take 500 mcg by mouth daily      QUEtiapine (SEROQUEL) 25 MG tablet Take 12.5 mg by mouth nightly      sodium polystyrene (KAYEXALATE) 15 GM/60ML suspension Take 15 g by mouth 4 times daily Sun, Mon, Wed, Fri      calcium acetate 667 MG TABS Take 1,334 mg by mouth 3 times daily (with meals)      hydrocortisone (PREPARATION H) 1 % cream Apply topically 2 times daily Apply topically 2 times daily.       acetaminophen (TYLENOL) 325 MG tablet Take 650 mg by mouth every 6 hours as needed for Pain      atorvastatin (LIPITOR) 80 MG tablet Take 80 mg by mouth at bedtime      diphenhydrAMINE (BENADRYL) 25 MG capsule Take 25 mg by mouth every 8 hours as needed       docusate sodium (COLACE) 100 MG capsule Take 100 mg by mouth 2 times daily      lactulose encephalopathy 10 GM/15ML SOLN solution Take 20 g by mouth daily as needed       polyethylene glycol (GLYCOLAX) 17 g packet Take 17 g by mouth daily as needed for Constipation      insulin NPH (HUMULIN N;NOVOLIN N) 100 UNIT/ML injection vial Inject into the skin every 6 hours       ipratropium-albuterol (DUONEB) 0.5-2.5 (3) MG/3ML SOLN nebulizer solution Inhale 1 vial into the lungs every 4 hours      furosemide (LASIX) 20 MG tablet Take 20 mg by mouth three times a week MWF      loperamide (IMODIUM) 2 MG capsule Take 2 mg by mouth 4 times daily as needed for Diarrhea      midodrine (PROAMATINE) 5 MG tablet Take 10 mg by mouth three times a week Tues thurs sat pre dialysis      dextromethorphan-guaiFENesin (MUCINEX DM)  MG per extended release tablet Take 1 tablet by mouth 2 times daily       omeprazole (PRILOSEC) 20 MG delayed release capsule Take 20 mg by mouth daily      chlorhexidine (PERIDEX) 0.12 % solution Take 15 mLs by mouth 2 times daily      sennosides-docusate sodium (SENOKOT-S) 8.6-50 MG tablet Take 2 tablets by mouth at bedtime       sertraline (ZOLOFT) 50 MG tablet Take 50 mg by mouth daily      terazosin (HYTRIN) 1 MG capsule Take 1 mg by mouth nightly      ondansetron (ZOFRAN) 4 MG tablet Take 4 mg by mouth every 8 hours as needed for Nausea or Vomiting           Current Discharge Medication List          Discharge ROS:  A complete review of systems was asked and negative    Discharge Exam:    BP (!) 145/67   Pulse 82   Temp 98.2 °F (36.8 °C) (Oral)   Resp 16   Ht 5' 4\" (1.626 m)   Wt (!) 320 lb 3 oz (145.2 kg)   SpO2 99%   BMI 54.96 kg/m²   General appearance:  NAD  HEENT:   Normal cephalic, atraumatic, moist mucous membranes, no oropharyngeal erythema or exudate tracheostomy in place  Heart[de-identified] Normal s1/s2, RRR, no murmurs, gallops, or rubs. no leg edema  Lungs:  Normal respiratory effort. Clear to auscultation, bilaterally without Rales/Wheezes/Rhonchi. Abdomen: Soft, non-tender, non-distended, bowel sounds present, no masses  Musculoskeletal:  No clubbing, no cyanosis, *  Neurologic:  Neurovascularly intact without any focal sensory/motor deficits. Cranial nerves: II-XII intact, grossly non-focal.    Labs: For convenience and continuity at follow-up the following most recent labs are provided:    Lab Results   Component Value Date    WBC 9.6 04/30/2022    HGB 7.2 04/30/2022    HCT 24.1 04/30/2022    MCV 98.8 04/30/2022     04/30/2022     04/30/2022    K 5.0 04/30/2022    K 4.6 04/28/2022    CL 98 04/30/2022    CO2 23 04/30/2022    BUN 58 04/30/2022    CREATININE 6.9 04/30/2022    CALCIUM 8.3 04/30/2022    PHOS 5.2 04/30/2022    ALKPHOS 100 04/28/2022    ALT 6 04/28/2022    AST 8 04/28/2022    BILITOT 0.3 04/28/2022    LABALBU 2.8 04/30/2022     Lab Results   Component Value Date    INR 1.22 (H) 04/30/2022    INR 1.26 (H) 04/29/2022    INR 1.19 (H) 04/28/2022           The patient was seen and examined on day of discharge and this discharge summary is in conjunction with any daily progress note from day of discharge. Time Spent on discharge is 45 minutes  in the examination, evaluation, counseling and review of medications and discharge plan.       Note that greater  than 30 minutes was spent in preparing discharge papers, discussing discharge with patient, medication review, etc.       Signed:    Madison Wyman MD   4/30/2022      Thank you Rhonda Gabriel MD for the opportunity to be involved in this patient's care.  If you have any questions or concerns please feel free to contact me

## 2022-04-30 NOTE — CARE COORDINATION
Discharge note:      CM/SW has been notified of discharge. Patient noted to have the following needs at discharge. CM/SW has coordinated the following services:      Discharge Destination:     [facility]  [address]   [place], [facility]  Phone: [phone]   Fax: [phone]      Transportation: *ACLS transport by [facility] on Sunday, 5/1/22 at 8:45-9:00am      [address]      Comment:  Please notify CM if needs change. [facility] at [place] notified of morning transport. All CM/SW needs met, will sign off.     Electronically signed by Abdiaziz Kraus RN on 4/30/2022 at 1:07 PM

## 2022-05-01 VITALS
HEART RATE: 82 BPM | RESPIRATION RATE: 20 BRPM | BODY MASS INDEX: 50.02 KG/M2 | DIASTOLIC BLOOD PRESSURE: 73 MMHG | HEIGHT: 64 IN | OXYGEN SATURATION: 99 % | WEIGHT: 293 LBS | TEMPERATURE: 97.1 F | SYSTOLIC BLOOD PRESSURE: 138 MMHG

## 2022-05-01 LAB
A/G RATIO: 0.5 (ref 1.1–2.2)
ALBUMIN SERPL-MCNC: 2.7 G/DL (ref 3.4–5)
ALP BLD-CCNC: 88 U/L (ref 40–129)
ALT SERPL-CCNC: 8 U/L (ref 10–40)
ANION GAP SERPL CALCULATED.3IONS-SCNC: 13 MMOL/L (ref 3–16)
ANISOCYTOSIS: ABNORMAL
ANISOCYTOSIS: ABNORMAL
AST SERPL-CCNC: 11 U/L (ref 15–37)
BANDED NEUTROPHILS RELATIVE PERCENT: 2 % (ref 0–7)
BANDED NEUTROPHILS RELATIVE PERCENT: 4 % (ref 0–7)
BASOPHILS ABSOLUTE: 0 K/UL (ref 0–0.2)
BASOPHILS ABSOLUTE: 0 K/UL (ref 0–0.2)
BASOPHILS RELATIVE PERCENT: 0 %
BASOPHILS RELATIVE PERCENT: 0 %
BILIRUB SERPL-MCNC: 0.4 MG/DL (ref 0–1)
BUN BLDV-MCNC: 48 MG/DL (ref 7–20)
BURR CELLS: ABNORMAL
CALCIUM SERPL-MCNC: 8.9 MG/DL (ref 8.3–10.6)
CHLORIDE BLD-SCNC: 96 MMOL/L (ref 99–110)
CO2: 27 MMOL/L (ref 21–32)
CREAT SERPL-MCNC: 5.7 MG/DL (ref 0.6–1.2)
EKG ATRIAL RATE: 114 BPM
EKG DIAGNOSIS: NORMAL
EKG P AXIS: 60 DEGREES
EKG P-R INTERVAL: 208 MS
EKG Q-T INTERVAL: 314 MS
EKG QRS DURATION: 86 MS
EKG QTC CALCULATION (BAZETT): 432 MS
EKG R AXIS: -15 DEGREES
EKG T AXIS: 75 DEGREES
EKG VENTRICULAR RATE: 114 BPM
EOSINOPHILS ABSOLUTE: 0.3 K/UL (ref 0–0.6)
EOSINOPHILS ABSOLUTE: 0.5 K/UL (ref 0–0.6)
EOSINOPHILS RELATIVE PERCENT: 2 %
EOSINOPHILS RELATIVE PERCENT: 5 %
ERYTHROPOIETIN: 11 MU/ML (ref 4–27)
GFR AFRICAN AMERICAN: 9
GFR NON-AFRICAN AMERICAN: 7
GLUCOSE BLD-MCNC: 106 MG/DL (ref 70–99)
GLUCOSE BLD-MCNC: 126 MG/DL (ref 70–99)
GLUCOSE BLD-MCNC: 130 MG/DL (ref 70–99)
GLUCOSE BLD-MCNC: 136 MG/DL (ref 70–99)
HCT VFR BLD CALC: 25.5 % (ref 36–48)
HCT VFR BLD CALC: 26.1 % (ref 36–48)
HEMATOLOGY PATH CONSULT: NO
HEMATOLOGY PATH CONSULT: NO
HEMOGLOBIN: 7.7 G/DL (ref 12–16)
HEMOGLOBIN: 8 G/DL (ref 12–16)
HYPOCHROMIA: ABNORMAL
INR BLD: 1.33 (ref 0.88–1.12)
INR BLD: 1.36 (ref 0.88–1.12)
LYMPHOCYTES ABSOLUTE: 1 K/UL (ref 1–5.1)
LYMPHOCYTES ABSOLUTE: 1.9 K/UL (ref 1–5.1)
LYMPHOCYTES RELATIVE PERCENT: 20 %
LYMPHOCYTES RELATIVE PERCENT: 6 %
MCH RBC QN AUTO: 29.2 PG (ref 26–34)
MCH RBC QN AUTO: 30.1 PG (ref 26–34)
MCHC RBC AUTO-ENTMCNC: 30.3 G/DL (ref 31–36)
MCHC RBC AUTO-ENTMCNC: 30.8 G/DL (ref 31–36)
MCV RBC AUTO: 96.4 FL (ref 80–100)
MCV RBC AUTO: 97.7 FL (ref 80–100)
MONOCYTES ABSOLUTE: 0.1 K/UL (ref 0–1.3)
MONOCYTES ABSOLUTE: 0.5 K/UL (ref 0–1.3)
MONOCYTES RELATIVE PERCENT: 1 %
MONOCYTES RELATIVE PERCENT: 3 %
MYELOCYTE PERCENT: 1 %
NEUTROPHILS ABSOLUTE: 14.7 K/UL (ref 1.7–7.7)
NEUTROPHILS ABSOLUTE: 7 K/UL (ref 1.7–7.7)
NEUTROPHILS RELATIVE PERCENT: 72 %
NEUTROPHILS RELATIVE PERCENT: 84 %
PDW BLD-RTO: 18.9 % (ref 12.4–15.4)
PDW BLD-RTO: 19.1 % (ref 12.4–15.4)
PELGER HUET CELLS: PRESENT
PELGER HUET CELLS: PRESENT
PERFORMED ON: ABNORMAL
PLATELET # BLD: 500 K/UL (ref 135–450)
PLATELET # BLD: 547 K/UL (ref 135–450)
PLATELET SLIDE REVIEW: ABNORMAL
PMV BLD AUTO: 7.3 FL (ref 5–10.5)
PMV BLD AUTO: 7.5 FL (ref 5–10.5)
POTASSIUM REFLEX MAGNESIUM: 4.3 MMOL/L (ref 3.5–5.1)
PROTHROMBIN TIME: 15.2 SEC (ref 9.9–12.7)
PROTHROMBIN TIME: 15.6 SEC (ref 9.9–12.7)
RBC # BLD: 2.64 M/UL (ref 4–5.2)
RBC # BLD: 2.67 M/UL (ref 4–5.2)
SLIDE REVIEW: ABNORMAL
SODIUM BLD-SCNC: 136 MMOL/L (ref 136–145)
TOTAL PROTEIN: 8 G/DL (ref 6.4–8.2)
TOXIC GRANULATION: PRESENT
TOXIC GRANULATION: PRESENT
WBC # BLD: 16.5 K/UL (ref 4–11)
WBC # BLD: 9.4 K/UL (ref 4–11)

## 2022-05-01 PROCEDURE — 80053 COMPREHEN METABOLIC PANEL: CPT

## 2022-05-01 PROCEDURE — 94640 AIRWAY INHALATION TREATMENT: CPT

## 2022-05-01 PROCEDURE — 6370000000 HC RX 637 (ALT 250 FOR IP): Performed by: INTERNAL MEDICINE

## 2022-05-01 PROCEDURE — 94761 N-INVAS EAR/PLS OXIMETRY MLT: CPT

## 2022-05-01 PROCEDURE — 85610 PROTHROMBIN TIME: CPT

## 2022-05-01 PROCEDURE — 6360000002 HC RX W HCPCS: Performed by: INTERNAL MEDICINE

## 2022-05-01 PROCEDURE — 2580000003 HC RX 258: Performed by: INTERNAL MEDICINE

## 2022-05-01 PROCEDURE — 2700000000 HC OXYGEN THERAPY PER DAY

## 2022-05-01 PROCEDURE — 93010 ELECTROCARDIOGRAM REPORT: CPT | Performed by: INTERNAL MEDICINE

## 2022-05-01 PROCEDURE — 85025 COMPLETE CBC W/AUTO DIFF WBC: CPT

## 2022-05-01 RX ORDER — WARFARIN SODIUM 1 MG/1
3 TABLET ORAL DAILY
Status: DISCONTINUED | OUTPATIENT
Start: 2022-05-02 | End: 2022-05-01 | Stop reason: HOSPADM

## 2022-05-01 RX ADMIN — SERTRALINE 50 MG: 50 TABLET, FILM COATED ORAL at 08:24

## 2022-05-01 RX ADMIN — PANTOPRAZOLE SODIUM 40 MG: 40 TABLET, DELAYED RELEASE ORAL at 08:31

## 2022-05-01 RX ADMIN — IPRATROPIUM BROMIDE AND ALBUTEROL SULFATE 3 ML: .5; 3 SOLUTION RESPIRATORY (INHALATION) at 13:07

## 2022-05-01 RX ADMIN — IPRATROPIUM BROMIDE AND ALBUTEROL SULFATE 3 ML: .5; 3 SOLUTION RESPIRATORY (INHALATION) at 09:30

## 2022-05-01 RX ADMIN — DIPHENHYDRAMINE HYDROCHLORIDE 25 MG: 50 INJECTION, SOLUTION INTRAMUSCULAR; INTRAVENOUS at 18:35

## 2022-05-01 RX ADMIN — Medication 10 ML: at 08:25

## 2022-05-01 RX ADMIN — DOCUSATE SODIUM 100 MG: 100 CAPSULE, LIQUID FILLED ORAL at 08:24

## 2022-05-01 RX ADMIN — INSULIN GLARGINE 10 UNITS: 100 INJECTION, SOLUTION SUBCUTANEOUS at 08:24

## 2022-05-01 RX ADMIN — ACETAMINOPHEN 650 MG: 325 TABLET ORAL at 09:35

## 2022-05-01 RX ADMIN — DIPHENHYDRAMINE HYDROCHLORIDE 25 MG: 50 INJECTION, SOLUTION INTRAMUSCULAR; INTRAVENOUS at 10:40

## 2022-05-01 RX ADMIN — WARFARIN SODIUM 6 MG: 5 TABLET ORAL at 17:04

## 2022-05-01 RX ADMIN — GUAIFENESIN AND DEXTROMETHORPHAN HYDROBROMIDE 1 TABLET: 600; 30 TABLET, EXTENDED RELEASE ORAL at 08:24

## 2022-05-01 RX ADMIN — IPRATROPIUM BROMIDE AND ALBUTEROL SULFATE 3 ML: .5; 3 SOLUTION RESPIRATORY (INHALATION) at 16:33

## 2022-05-01 ASSESSMENT — PAIN SCALES - WONG BAKER
WONGBAKER_NUMERICALRESPONSE: 4

## 2022-05-01 ASSESSMENT — PAIN SCALES - GENERAL
PAINLEVEL_OUTOF10: 2
PAINLEVEL_OUTOF10: 3

## 2022-05-01 NOTE — PROGRESS NOTES
[facility]   Nephrology progress Note  [phone] [address] [address]. [facility]           Reason for Consult:  ESRD on HD  The patient is a 80 y.o. female with significant past medical history of ESRD on maintenance hemodialysis Tuesday Thursday Saturday at [facility], obstructive sleep apnea with a tracheostomy, atrial fibrillation, type 2 diabetes mellitus, pulmonary hypertension, CVA, GERD, presented to the ED in [facility] with vaginal bleeding of unknown duration. She says she has been on hemodialysis for about 5 years and the and the main reason her kidneys failed was diabetes mellitus type 2  I asked her about foul discharge per vagina she says negative, she says the bleeding is more or less stopped  There is no fever      Interval  History:    No new issues   Resting comfortably     PHYSICAL EXAM:    Vitals:    /60   Pulse 80   Temp 97.2 °F (36.2 °C) (Temporal)   Resp 18   Ht 5' 4\" (1.626 m)   Wt (!) 309 lb 8 oz (140.4 kg)   SpO2 100%   BMI 53.13 kg/m²   No intake/output data recorded. I/O this shift: In: 56 [P.O.:480; I.V.:10]  Out: -     Physical Exam:  Gen:  alert, oriented x 3, obese  PERRL , EOM +  Pallor +, No icterus  JVP not raised , tracheostomy  CV: RRR no murmur or rub . Lungs:B/ L air entry, Normal breath sounds   Abd: soft, bowel sounds + , No organomegaly   Ext: No edema, no cyanosis  Skin: Warm.   No rash  Left upper arm AV graft with good thrill  Neuro: nonfocal.      DATA:    CBC with Differential:    Lab Results   Component Value Date    WBC 9.4 05/01/2022    RBC 2.67 05/01/2022    HGB 8.0 05/01/2022    HCT 26.1 05/01/2022     05/01/2022    MCV 97.7 05/01/2022    MCH 30.1 05/01/2022    MCHC 30.8 05/01/2022    RDW 18.9 05/01/2022    BANDSPCT 2 05/01/2022    LYMPHOPCT 20.0 05/01/2022    MONOPCT 1.0 05/01/2022    MYELOPCT 1 04/30/2022    BASOPCT 0.0 05/01/2022    MONOSABS 0.1 05/01/2022    LYMPHSABS 1.9 05/01/2022    EOSABS 0.5 05/01/2022    BASOSABS 0.0 05/01/2022     CMP:    Lab Results   Component Value Date     05/01/2022    K 4.3 05/01/2022    CL 96 05/01/2022    CO2 27 05/01/2022    BUN 48 05/01/2022    CREATININE 5.7 05/01/2022    GFRAA 9 05/01/2022    AGRATIO 0.5 05/01/2022    LABGLOM 7 05/01/2022    GLUCOSE 106 05/01/2022    PROT 8.0 05/01/2022    LABALBU 2.7 05/01/2022    CALCIUM 8.9 05/01/2022    BILITOT 0.4 05/01/2022    ALKPHOS 88 05/01/2022    AST 11 05/01/2022    ALT 8 05/01/2022     Magnesium:  No results found for: MG  Phosphorus:    Lab Results   Component Value Date    PHOS 5.2 04/30/2022     PT/INR:    Lab Results   Component Value Date    PROTIME 15.2 05/01/2022    INR 1.33 05/01/2022     Troponin:    Lab Results   Component Value Date    TROPONINI 0.22 04/30/2022     U/A:  No results found for: NITRITE, COLORU, PROTEINU, PHUR, LABCAST, WBCUA, RBCUA, MUCUS, TRICHOMONAS, YEAST, BACTERIA, CLARITYU, SPECGRAV, LEUKOCYTESUR, UROBILINOGEN, BILIRUBINUR, BLOODU, GLUCOSEU, AMORPHOUS        IMPRESSION/RECOMMENDATIONS:      1. ESRD on HD: Does HD at [facility] Tuesday Thursday Saturday, there is no evidence of fluid overload. Next plan for on Tuesday if still here    2. Vaginal Bleeding    3. Anemia : Hemoglobin 8.0. seems to have stabilized. Will need to increase KOKO as outpatient     4. Renal osteodystrophy: Calcium 8.2, phosphorus 4.6    Thank you for allowing me to participate in the care of this patient. I will continue to follow along. Please call with questions.     Kirill Mortensen MD, MD  5/1/2022  [facility]

## 2022-05-01 NOTE — PROGRESS NOTES
Hematology Oncology Daily Progress Note    Admit Date: 4/26/2022  Hospital day several    Subjective:     Patient has complaints of mod fatigue--denies sob/cp. Vaginal bleeding has stopped. .   Medication side effects: none    Scheduled Meds:   warfarin  6 mg Oral Once    [START ON 5/2/2022] warfarin  3 mg Oral Daily    sodium chloride flush  10 mL IntraVENous 2 times per day    atorvastatin  80 mg Oral Nightly    dextromethorphan-guaiFENesin  1 tablet Oral BID    docusate sodium  100 mg Oral BID    insulin glargine  10 Units SubCUTAneous Daily    QUEtiapine  12.5 mg Oral Nightly    sertraline  50 mg Oral Daily    pantoprazole  40 mg Oral QAM AC    insulin lispro  0-6 Units SubCUTAneous TID WC    insulin lispro  0-3 Units SubCUTAneous Nightly    ipratropium-albuterol  1 vial Inhalation Q4H WA     Continuous Infusions:   sodium chloride      sodium chloride      dextrose       PRN Meds:diphenhydrAMINE, sodium chloride, morphine, sodium chloride flush, sodium chloride, promethazine **OR** ondansetron, acetaminophen **OR** acetaminophen, glucose, glucagon (rDNA), dextrose, labetalol, dextrose bolus (hypoglycemia) **OR** dextrose bolus (hypoglycemia), ipratropium-albuterol    Review of Systems  Pertinent items are noted in HPI. REVIEW OF SYSTEMS:         · Constitutional: Denies fever, sweats, weight loss     · Eyes: No visual changes or diplopia. No scleral icterus. · ENT: No Headaches, hearing loss or vertigo. No mouth sores or sore throat. · Cardiovascular: No chest pain, dyspnea on exertion, palpitations or loss of consciousness. · Respiratory: No cough or wheezing, no sputum production. No hemoptysis. .    · Gastrointestinal: No abdominal pain, appetite loss, blood in stools. No change in bowel habits. · Genitourinary: No dysuria, trouble voiding, or hematuria. · Musculoskeletal:  Generalized weakness. No joint complaints. · Integumentary: No rash or pruritis.   · Neurological: No headache, diplopia. No change in gait, balance, or coordination. No paresthesias. · Endocrine: No temperature intolerance. No excessive thirst, fluid intake, or urination. · Hematologic/Lymphatic: No abnormal bruising or ecchymoses, blood clots or swollen lymph nodes. · Allergic/Immunologic: No nasal congestion or hives. ·     Objective:     Patient Vitals for the past 8 hrs:   BP Temp Temp src Pulse Resp SpO2 Weight   05/01/22 0930 -- -- -- -- -- 100 % --   05/01/22 0740 (!) 129/46 96.9 °F (36.1 °C) Temporal 72 16 100 % --   05/01/22 0330 (!) 137/57 97 °F (36.1 °C) Temporal 81 21 100 % (!) 309 lb 8 oz (140.4 kg)     No intake/output data recorded. I/O this shift:  In: 250 [P.O.:240;  I.V.:10]  Out: -     BP (!) 129/46   Pulse 72   Temp 96.9 °F (36.1 °C) (Temporal)   Resp 16   Ht 5' 4\" (1.626 m)   Wt (!) 309 lb 8 oz (140.4 kg)   SpO2 100%   BMI 53.13 kg/m²     General Appearance:    Alert, cooperative, no distress, appears stated age   Head:    Normocephalic, without obvious abnormality, atraumatic   Eyes:    PERRL, conjunctiva/corneas clear, EOM's intact, fundi     benign, both eyes        Ears:    Normal TM's and external ear canals, both ears   Nose:   Nares normal, septum midline, mucosa normal, no drainage    or sinus tenderness   Throat:   Lips, mucosa, and tongue normal; teeth and gums normal   Neck:   Supple, symmetrical, trachea midline, no adenopathy;        thyroid:  No enlargement/tenderness/nodules; no carotid    bruit or JVD   Back:     Symmetric, no curvature, ROM normal, no CVA tenderness   Lungs:     Clear to auscultation bilaterally, respirations unlabored   Chest wall:    No tenderness or deformity   Heart:    Regular rate and rhythm, S1 and S2 normal, no murmur, rub   or gallop   Abdomen:     Soft, non-tender, bowel sounds active all four quadrants,     no masses, no organomegaly           Extremities:   Extremities normal, atraumatic, no cyanosis or edema   Pulses:   2+ and symmetric all extremities   Skin:   Skin color, texture, turgor normal, no rashes or lesions   Lymph nodes:   Cervical, supraclavicular, and axillary nodes normal   Neurologic:   Stable         Data Review  CBC:   Lab Results   Component Value Date    WBC 9.4 05/01/2022    RBC 2.67 05/01/2022       Assessment:     Principal Problem:    Vaginal bleeding  Resolved Problems:    * No resolved hospital problems. *      Plan:     1. Anemia. Hb improved from Friday. Drop likely due to vaginal bleeding which has stopped. SPEP pending. Light chains elevated but ratio normal--not consistent with myeloma. Check for hemolysis.     2. ID--AF        Electronically signed by Mabel Felty, MD on 5/1/2022 at 11:14 AM

## 2022-05-01 NOTE — PROGRESS NOTES
Pharmacy to Dose Warfarin    Pharmacy consulted to dose warfarin for afib, hx DVT. INR Goal: 2-3    INR today: 1.36    Assessment/Plan:  - Dose was not given yesterday per physician order  -Recent vitamin K administration  -Will dose 6mg x1 today followed by 3mg daily    Pharmacy will continue to follow.     Lu Stephens, LeonaD, BCCCP

## 2022-05-01 NOTE — PROGRESS NOTES
Report called to [address] at [facility]. All questions answered. Call back number left for any further questions. Pt. To be picked up via "[employer] squad at [phone].     Electronically signed by Nahomy Cash RN on 5/1/2022 at 3:08 PM

## 2022-05-01 NOTE — PROGRESS NOTES
[address]  Patient has size # 6  XLT or other. Trach Kit of same size on standby  Yes, Obturator at head of the bed  Yes. Inner cannula cleaned/replaced Yes, drain sponge changed  Yes, Trach tie replaced is soiled No, Flange cleaned  No. Trach Care performed without complications. Comment: no drainage around Trach.

## 2022-05-01 NOTE — PROGRESS NOTES
Discharged to [facility] at [place] via mobile transport.

## 2022-05-01 NOTE — CARE COORDINATION
SW informed pt is ready for discharge today back to [address] [facility] home. [address] available transport was 7:30pm.  Called the facility and informed of this  time. They will be ready to receive him. Discharge packet completed and left on the chart. Discharge Plan:  [facility]  [address] Age   [place], [facility]  Phone: [phone]   Fax: [address] at 7:30pm tonight.     Electronically signed by EDMUNDO Santiago, DARIUSZ on 5/1/2022 at 3:01 PM

## 2022-05-02 LAB
ALBUMIN SERPL-MCNC: 2.4 G/DL (ref 3.1–4.9)
ALPHA-1-GLOBULIN: 0.4 G/DL (ref 0.2–0.4)
ALPHA-2-GLOBULIN: 1.3 G/DL (ref 0.4–1.1)
BETA GLOBULIN: 1.2 G/DL (ref 0.9–1.6)
BLOOD CULTURE, ROUTINE: NORMAL
COPPER: 123.4 UG/DL (ref 80–155)
CULTURE, BLOOD 2: NORMAL
GAMMA GLOBULIN: 2.2 G/DL (ref 0.6–1.8)
SPE/IFE INTERPRETATION: NORMAL
TOTAL PROTEIN: 7.5 G/DL (ref 6.4–8.2)
ZINC: 65.3 UG/DL (ref 60–120)

## 2022-05-04 NOTE — PROGRESS NOTES
Physician Progress Note      PATIENT:               Ruiz Persaud  CSN #:                  [id number]  :                       1950  ADMIT DATE:       2022 6:47 PM  [address] DATE:        2022 8:05 PM  RESPONDING  PROVIDER #:        Sheldon Tierney MD          QUERY TEXT:    Patient admitted with vaginal bleeding. Documentation reflects suspected   sepsis prior to arrival in  on . If possible, please document in the   progress notes and discharge summary if sepsis was: The medical record reflects the following:  Risk Factors: ESRD, Trach    Clinical Indicators: WBC () 19.2  , 'Suspected sepsis prior to arrival leukocytosis, HR>90 Etiology unclear at   this time'  Per nursing flow sheet ( and ), HR max 77    Treatment: IV Vanco and ceftriaxone x1 dose empirically, serial labs, blood   cxs,    thank you,  [email or web address]  Options provided:  -- Sepsis ruled out after study  -- Possible sepsis confirmed after study related to, please identify infection   or whether unknown infection. -- Other - I will add my own diagnosis  -- Disagree - Not applicable / Not valid  -- Disagree - Clinically unable to determine / Unknown  -- Refer to Clinical Documentation Reviewer    PROVIDER RESPONSE TEXT:    Sepsis ruled out after study. Query created by:  Dinorah Hall on 2022 12:26 PM      Electronically signed by:  Sheldon Tierney MD 2022 5:13 PM

## 2022-05-06 ENCOUNTER — HOSPITAL ENCOUNTER (INPATIENT)
Age: 72
LOS: 15 days | Discharge: NURSING, REHAB OR LONG-TERM CARE | DRG: 335 | End: 2022-05-21
Attending: STUDENT IN AN ORGANIZED HEALTH CARE EDUCATION/TRAINING PROGRAM | Admitting: INTERNAL MEDICINE
Payer: COMMERCIAL

## 2022-05-06 ENCOUNTER — APPOINTMENT (OUTPATIENT)
Dept: CT IMAGING | Age: 72
DRG: 335 | End: 2022-05-06
Payer: COMMERCIAL

## 2022-05-06 ENCOUNTER — APPOINTMENT (OUTPATIENT)
Dept: GENERAL RADIOLOGY | Age: 72
DRG: 335 | End: 2022-05-06
Payer: COMMERCIAL

## 2022-05-06 DIAGNOSIS — K56.609 SBO (SMALL BOWEL OBSTRUCTION) (HCC): Primary | ICD-10-CM

## 2022-05-06 LAB
A/G RATIO: 0.6 (ref 1.1–2.2)
ALBUMIN SERPL-MCNC: 3.2 G/DL (ref 3.4–5)
ALP BLD-CCNC: 98 U/L (ref 40–129)
ALT SERPL-CCNC: 7 U/L (ref 10–40)
ANION GAP SERPL CALCULATED.3IONS-SCNC: 16 MMOL/L (ref 3–16)
AST SERPL-CCNC: 10 U/L (ref 15–37)
BASOPHILS ABSOLUTE: 0.1 K/UL (ref 0–0.2)
BASOPHILS RELATIVE PERCENT: 0.9 %
BILIRUB SERPL-MCNC: 0.3 MG/DL (ref 0–1)
BUN BLDV-MCNC: 63 MG/DL (ref 7–20)
CALCIUM SERPL-MCNC: 9.6 MG/DL (ref 8.3–10.6)
CHLORIDE BLD-SCNC: 94 MMOL/L (ref 99–110)
CO2: 26 MMOL/L (ref 21–32)
CREAT SERPL-MCNC: 6.3 MG/DL (ref 0.6–1.2)
EKG ATRIAL RATE: 80 BPM
EKG DIAGNOSIS: NORMAL
EKG P AXIS: 62 DEGREES
EKG P-R INTERVAL: 224 MS
EKG Q-T INTERVAL: 408 MS
EKG QRS DURATION: 88 MS
EKG QTC CALCULATION (BAZETT): 470 MS
EKG R AXIS: -15 DEGREES
EKG T AXIS: 23 DEGREES
EKG VENTRICULAR RATE: 80 BPM
EOSINOPHILS ABSOLUTE: 0.2 K/UL (ref 0–0.6)
EOSINOPHILS RELATIVE PERCENT: 1.3 %
GFR AFRICAN AMERICAN: 8
GFR NON-AFRICAN AMERICAN: 7
GLUCOSE BLD-MCNC: 100 MG/DL (ref 70–99)
GLUCOSE BLD-MCNC: 110 MG/DL (ref 70–99)
GLUCOSE BLD-MCNC: 114 MG/DL (ref 70–99)
GLUCOSE BLD-MCNC: 135 MG/DL (ref 70–99)
HCT VFR BLD CALC: 25.6 % (ref 36–48)
HEMOGLOBIN: 7.9 G/DL (ref 12–16)
LYMPHOCYTES ABSOLUTE: 0.9 K/UL (ref 1–5.1)
LYMPHOCYTES RELATIVE PERCENT: 7.1 %
MCH RBC QN AUTO: 29.3 PG (ref 26–34)
MCHC RBC AUTO-ENTMCNC: 30.9 G/DL (ref 31–36)
MCV RBC AUTO: 94.9 FL (ref 80–100)
MONOCYTES ABSOLUTE: 0.4 K/UL (ref 0–1.3)
MONOCYTES RELATIVE PERCENT: 3.5 %
NEUTROPHILS ABSOLUTE: 11.1 K/UL (ref 1.7–7.7)
NEUTROPHILS RELATIVE PERCENT: 87.2 %
PDW BLD-RTO: 18.2 % (ref 12.4–15.4)
PERFORMED ON: ABNORMAL
PHOSPHORUS: 5.4 MG/DL (ref 2.5–4.9)
PLATELET # BLD: 509 K/UL (ref 135–450)
PMV BLD AUTO: 7.2 FL (ref 5–10.5)
POTASSIUM REFLEX MAGNESIUM: 4.7 MMOL/L (ref 3.5–5.1)
PRO-BNP: ABNORMAL PG/ML (ref 0–124)
RBC # BLD: 2.7 M/UL (ref 4–5.2)
SODIUM BLD-SCNC: 136 MMOL/L (ref 136–145)
TOTAL PROTEIN: 8.5 G/DL (ref 6.4–8.2)
TROPONIN: 0.2 NG/ML
WBC # BLD: 12.7 K/UL (ref 4–11)

## 2022-05-06 PROCEDURE — 93005 ELECTROCARDIOGRAM TRACING: CPT | Performed by: STUDENT IN AN ORGANIZED HEALTH CARE EDUCATION/TRAINING PROGRAM

## 2022-05-06 PROCEDURE — 6360000002 HC RX W HCPCS: Performed by: NURSE PRACTITIONER

## 2022-05-06 PROCEDURE — 83036 HEMOGLOBIN GLYCOSYLATED A1C: CPT

## 2022-05-06 PROCEDURE — 85025 COMPLETE CBC W/AUTO DIFF WBC: CPT

## 2022-05-06 PROCEDURE — 6370000000 HC RX 637 (ALT 250 FOR IP): Performed by: INTERNAL MEDICINE

## 2022-05-06 PROCEDURE — 74018 RADEX ABDOMEN 1 VIEW: CPT

## 2022-05-06 PROCEDURE — 6360000002 HC RX W HCPCS: Performed by: INTERNAL MEDICINE

## 2022-05-06 PROCEDURE — 5A1D70Z PERFORMANCE OF URINARY FILTRATION, INTERMITTENT, LESS THAN 6 HOURS PER DAY: ICD-10-PCS | Performed by: INTERNAL MEDICINE

## 2022-05-06 PROCEDURE — 99285 EMERGENCY DEPT VISIT HI MDM: CPT

## 2022-05-06 PROCEDURE — 74176 CT ABD & PELVIS W/O CONTRAST: CPT

## 2022-05-06 PROCEDURE — 2580000003 HC RX 258: Performed by: INTERNAL MEDICINE

## 2022-05-06 PROCEDURE — 99222 1ST HOSP IP/OBS MODERATE 55: CPT | Performed by: SURGERY

## 2022-05-06 PROCEDURE — 93010 ELECTROCARDIOGRAM REPORT: CPT | Performed by: INTERNAL MEDICINE

## 2022-05-06 PROCEDURE — 90935 HEMODIALYSIS ONE EVALUATION: CPT

## 2022-05-06 PROCEDURE — 2060000000 HC ICU INTERMEDIATE R&B

## 2022-05-06 PROCEDURE — P9047 ALBUMIN (HUMAN), 25%, 50ML: HCPCS | Performed by: INTERNAL MEDICINE

## 2022-05-06 PROCEDURE — 6370000000 HC RX 637 (ALT 250 FOR IP): Performed by: STUDENT IN AN ORGANIZED HEALTH CARE EDUCATION/TRAINING PROGRAM

## 2022-05-06 PROCEDURE — 83880 ASSAY OF NATRIURETIC PEPTIDE: CPT

## 2022-05-06 PROCEDURE — 84100 ASSAY OF PHOSPHORUS: CPT

## 2022-05-06 PROCEDURE — 6360000002 HC RX W HCPCS: Performed by: HOSPITALIST

## 2022-05-06 PROCEDURE — 36415 COLL VENOUS BLD VENIPUNCTURE: CPT

## 2022-05-06 PROCEDURE — 80053 COMPREHEN METABOLIC PANEL: CPT

## 2022-05-06 PROCEDURE — 84484 ASSAY OF TROPONIN QUANT: CPT

## 2022-05-06 RX ORDER — ONDANSETRON 4 MG/1
4 TABLET, ORALLY DISINTEGRATING ORAL EVERY 8 HOURS PRN
Status: DISCONTINUED | OUTPATIENT
Start: 2022-05-06 | End: 2022-05-21 | Stop reason: HOSPADM

## 2022-05-06 RX ORDER — HYDROMORPHONE HYDROCHLORIDE 1 MG/ML
0.25 INJECTION, SOLUTION INTRAMUSCULAR; INTRAVENOUS; SUBCUTANEOUS EVERY 4 HOURS PRN
Status: DISCONTINUED | OUTPATIENT
Start: 2022-05-06 | End: 2022-05-07

## 2022-05-06 RX ORDER — DEXTROSE MONOHYDRATE 50 MG/ML
100 INJECTION, SOLUTION INTRAVENOUS PRN
Status: DISCONTINUED | OUTPATIENT
Start: 2022-05-06 | End: 2022-05-21 | Stop reason: HOSPADM

## 2022-05-06 RX ORDER — ONDANSETRON 4 MG/1
4 TABLET, ORALLY DISINTEGRATING ORAL ONCE
Status: COMPLETED | OUTPATIENT
Start: 2022-05-06 | End: 2022-05-06

## 2022-05-06 RX ORDER — SODIUM CHLORIDE 9 MG/ML
INJECTION, SOLUTION INTRAVENOUS PRN
Status: DISCONTINUED | OUTPATIENT
Start: 2022-05-06 | End: 2022-05-21 | Stop reason: HOSPADM

## 2022-05-06 RX ORDER — ACETAMINOPHEN 325 MG/1
650 TABLET ORAL EVERY 6 HOURS PRN
Status: DISCONTINUED | OUTPATIENT
Start: 2022-05-06 | End: 2022-05-06

## 2022-05-06 RX ORDER — NICOTINE POLACRILEX 4 MG
15 LOZENGE BUCCAL PRN
Status: DISCONTINUED | OUTPATIENT
Start: 2022-05-06 | End: 2022-05-21 | Stop reason: HOSPADM

## 2022-05-06 RX ORDER — LACTULOSE 10 G/15ML
20 SOLUTION ORAL; RECTAL DAILY
Status: DISCONTINUED | OUTPATIENT
Start: 2022-05-06 | End: 2022-05-06

## 2022-05-06 RX ORDER — SODIUM CHLORIDE 9 MG/ML
INJECTION, SOLUTION INTRAVENOUS CONTINUOUS
Status: DISCONTINUED | OUTPATIENT
Start: 2022-05-06 | End: 2022-05-09

## 2022-05-06 RX ORDER — MIDODRINE HYDROCHLORIDE 5 MG/1
5 TABLET ORAL ONCE
Status: COMPLETED | OUTPATIENT
Start: 2022-05-06 | End: 2022-05-06

## 2022-05-06 RX ORDER — POLYETHYLENE GLYCOL 3350 17 G/17G
17 POWDER, FOR SOLUTION ORAL DAILY PRN
Status: DISCONTINUED | OUTPATIENT
Start: 2022-05-06 | End: 2022-05-21 | Stop reason: HOSPADM

## 2022-05-06 RX ORDER — DEXTROSE MONOHYDRATE 25 G/50ML
12.5 INJECTION, SOLUTION INTRAVENOUS PRN
Status: DISCONTINUED | OUTPATIENT
Start: 2022-05-06 | End: 2022-05-06

## 2022-05-06 RX ORDER — POLYETHYLENE GLYCOL 3350 17 G/17G
17 POWDER, FOR SOLUTION ORAL DAILY PRN
Status: DISCONTINUED | OUTPATIENT
Start: 2022-05-06 | End: 2022-05-06

## 2022-05-06 RX ORDER — ONDANSETRON 4 MG/1
TABLET, ORALLY DISINTEGRATING ORAL
Status: DISPENSED
Start: 2022-05-06 | End: 2022-05-06

## 2022-05-06 RX ORDER — ACETAMINOPHEN 650 MG/1
650 SUPPOSITORY RECTAL EVERY 6 HOURS PRN
Status: DISCONTINUED | OUTPATIENT
Start: 2022-05-06 | End: 2022-05-06

## 2022-05-06 RX ORDER — ONDANSETRON 4 MG/1
4 TABLET, ORALLY DISINTEGRATING ORAL EVERY 8 HOURS PRN
Status: DISCONTINUED | OUTPATIENT
Start: 2022-05-06 | End: 2022-05-06

## 2022-05-06 RX ORDER — SODIUM CHLORIDE 0.9 % (FLUSH) 0.9 %
5-40 SYRINGE (ML) INJECTION EVERY 12 HOURS SCHEDULED
Status: DISCONTINUED | OUTPATIENT
Start: 2022-05-06 | End: 2022-05-21 | Stop reason: HOSPADM

## 2022-05-06 RX ORDER — MORPHINE SULFATE 2 MG/ML
2 INJECTION, SOLUTION INTRAMUSCULAR; INTRAVENOUS EVERY 4 HOURS PRN
Status: DISCONTINUED | OUTPATIENT
Start: 2022-05-06 | End: 2022-05-06

## 2022-05-06 RX ORDER — ALBUMIN (HUMAN) 12.5 G/50ML
25 SOLUTION INTRAVENOUS
Status: COMPLETED | OUTPATIENT
Start: 2022-05-06 | End: 2022-05-06

## 2022-05-06 RX ORDER — ONDANSETRON 2 MG/ML
4 INJECTION INTRAMUSCULAR; INTRAVENOUS EVERY 6 HOURS PRN
Status: DISCONTINUED | OUTPATIENT
Start: 2022-05-06 | End: 2022-05-06

## 2022-05-06 RX ORDER — HYDROMORPHONE HYDROCHLORIDE 1 MG/ML
1 INJECTION, SOLUTION INTRAMUSCULAR; INTRAVENOUS; SUBCUTANEOUS ONCE
Status: COMPLETED | OUTPATIENT
Start: 2022-05-06 | End: 2022-05-06

## 2022-05-06 RX ORDER — SODIUM CHLORIDE 0.9 % (FLUSH) 0.9 %
5-40 SYRINGE (ML) INJECTION PRN
Status: DISCONTINUED | OUTPATIENT
Start: 2022-05-06 | End: 2022-05-06

## 2022-05-06 RX ORDER — HEPARIN SODIUM 5000 [USP'U]/ML
5000 INJECTION, SOLUTION INTRAVENOUS; SUBCUTANEOUS 3 TIMES DAILY
Status: DISCONTINUED | OUTPATIENT
Start: 2022-05-06 | End: 2022-05-21 | Stop reason: HOSPADM

## 2022-05-06 RX ORDER — HEPARIN SODIUM 1000 [USP'U]/ML
1000 INJECTION, SOLUTION INTRAVENOUS; SUBCUTANEOUS ONCE
Status: DISCONTINUED | OUTPATIENT
Start: 2022-05-06 | End: 2022-05-21 | Stop reason: HOSPADM

## 2022-05-06 RX ORDER — ONDANSETRON 2 MG/ML
4 INJECTION INTRAMUSCULAR; INTRAVENOUS EVERY 6 HOURS PRN
Status: DISCONTINUED | OUTPATIENT
Start: 2022-05-06 | End: 2022-05-21 | Stop reason: HOSPADM

## 2022-05-06 RX ORDER — HYDROMORPHONE HYDROCHLORIDE 1 MG/ML
1 INJECTION, SOLUTION INTRAMUSCULAR; INTRAVENOUS; SUBCUTANEOUS EVERY 4 HOURS PRN
Status: DISCONTINUED | OUTPATIENT
Start: 2022-05-06 | End: 2022-05-06

## 2022-05-06 RX ORDER — ACETAMINOPHEN 325 MG/1
650 TABLET ORAL EVERY 6 HOURS PRN
Status: DISCONTINUED | OUTPATIENT
Start: 2022-05-06 | End: 2022-05-21 | Stop reason: HOSPADM

## 2022-05-06 RX ORDER — INSULIN LISPRO 100 [IU]/ML
0-6 INJECTION, SOLUTION INTRAVENOUS; SUBCUTANEOUS EVERY 4 HOURS
Status: DISCONTINUED | OUTPATIENT
Start: 2022-05-06 | End: 2022-05-12

## 2022-05-06 RX ORDER — ACETAMINOPHEN 650 MG/1
650 SUPPOSITORY RECTAL EVERY 6 HOURS PRN
Status: DISCONTINUED | OUTPATIENT
Start: 2022-05-06 | End: 2022-05-21 | Stop reason: HOSPADM

## 2022-05-06 RX ORDER — DIPHENHYDRAMINE HYDROCHLORIDE 50 MG/ML
25 INJECTION INTRAMUSCULAR; INTRAVENOUS EVERY 6 HOURS PRN
Status: DISCONTINUED | OUTPATIENT
Start: 2022-05-06 | End: 2022-05-21 | Stop reason: HOSPADM

## 2022-05-06 RX ORDER — SODIUM CHLORIDE 0.9 % (FLUSH) 0.9 %
5-40 SYRINGE (ML) INJECTION PRN
Status: DISCONTINUED | OUTPATIENT
Start: 2022-05-06 | End: 2022-05-21 | Stop reason: HOSPADM

## 2022-05-06 RX ADMIN — HYDROMORPHONE HYDROCHLORIDE 0.25 MG: 1 INJECTION, SOLUTION INTRAMUSCULAR; INTRAVENOUS; SUBCUTANEOUS at 17:57

## 2022-05-06 RX ADMIN — HYDROMORPHONE HYDROCHLORIDE 1 MG: 1 INJECTION, SOLUTION INTRAMUSCULAR; INTRAVENOUS; SUBCUTANEOUS at 14:43

## 2022-05-06 RX ADMIN — HYDROMORPHONE HYDROCHLORIDE 0.25 MG: 1 INJECTION, SOLUTION INTRAMUSCULAR; INTRAVENOUS; SUBCUTANEOUS at 23:09

## 2022-05-06 RX ADMIN — SODIUM CHLORIDE: 9 INJECTION, SOLUTION INTRAVENOUS at 18:03

## 2022-05-06 RX ADMIN — MIDODRINE HYDROCHLORIDE 5 MG: 5 TABLET ORAL at 14:50

## 2022-05-06 RX ADMIN — ALBUMIN (HUMAN) 25 G: 0.25 INJECTION, SOLUTION INTRAVENOUS at 14:53

## 2022-05-06 RX ADMIN — ONDANSETRON 4 MG: 4 TABLET, ORALLY DISINTEGRATING ORAL at 05:54

## 2022-05-06 RX ADMIN — EPOETIN ALFA-EPBX 10000 UNITS: 10000 INJECTION, SOLUTION INTRAVENOUS; SUBCUTANEOUS at 14:54

## 2022-05-06 RX ADMIN — DIPHENHYDRAMINE HYDROCHLORIDE 25 MG: 50 INJECTION, SOLUTION INTRAMUSCULAR; INTRAVENOUS at 23:09

## 2022-05-06 RX ADMIN — ONDANSETRON 4 MG: 2 INJECTION INTRAMUSCULAR; INTRAVENOUS at 23:09

## 2022-05-06 ASSESSMENT — PAIN DESCRIPTION - DESCRIPTORS
DESCRIPTORS: ACHING

## 2022-05-06 ASSESSMENT — PAIN SCALES - GENERAL
PAINLEVEL_OUTOF10: 10
PAINLEVEL_OUTOF10: 10
PAINLEVEL_OUTOF10: 0
PAINLEVEL_OUTOF10: 10
PAINLEVEL_OUTOF10: 10
PAINLEVEL_OUTOF10: 0
PAINLEVEL_OUTOF10: 10

## 2022-05-06 ASSESSMENT — PAIN DESCRIPTION - ORIENTATION
ORIENTATION: MID

## 2022-05-06 ASSESSMENT — ENCOUNTER SYMPTOMS
TROUBLE SWALLOWING: 1
CHEST TIGHTNESS: 1
ABDOMINAL PAIN: 1
CONSTIPATION: 1
SHORTNESS OF BREATH: 1
COUGH: 1

## 2022-05-06 ASSESSMENT — PAIN - FUNCTIONAL ASSESSMENT
PAIN_FUNCTIONAL_ASSESSMENT: PREVENTS OR INTERFERES SOME ACTIVE ACTIVITIES AND ADLS

## 2022-05-06 ASSESSMENT — PAIN DESCRIPTION - PAIN TYPE
TYPE: ACUTE PAIN
TYPE: ACUTE PAIN

## 2022-05-06 ASSESSMENT — PAIN SCALES - WONG BAKER
WONGBAKER_NUMERICALRESPONSE: 4

## 2022-05-06 ASSESSMENT — PAIN DESCRIPTION - LOCATION
LOCATION: ABDOMEN
LOCATION: LEG;ABDOMEN
LOCATION: ABDOMEN
LOCATION: ABDOMEN

## 2022-05-06 ASSESSMENT — PAIN DESCRIPTION - FREQUENCY
FREQUENCY: CONTINUOUS
FREQUENCY: CONTINUOUS

## 2022-05-06 ASSESSMENT — PAIN DESCRIPTION - ONSET
ONSET: ON-GOING
ONSET: ON-GOING

## 2022-05-06 NOTE — ED NOTES
Patient continues to state she can't breath. Patient reassured her oxygen is on and connected to trach and that her O2 sat is between 97% and 100%.       Viridiana Damon RN  05/06/22 2097

## 2022-05-06 NOTE — CONSULTS
[facility] GENERAL AND LAPAROSCOPIC SURGERY                       PATIENT NAME: Simeon Swanson     ADMISSION DATE: 5/6/2022  4:03 AM      TODAY'S DATE: 5/6/2022    Reason for Consult:  Abd pain    Requesting Physician: Dr. Naila Gibbs:              The patient is a 80 y.o. female who presents with abd pain, upper mid abdomen, dull, focal, mild. Has had assoc N/V. Felt sick over the 1 - 2 days prior to admission. No F/C. Has felt constipated also. No recent GI bleeding noted. Pt is chronically debilitated, lying in bed, nonambulatory, with trach in place, speaking, not on vent. Has felt a little SOB as well. Past Medical History:        Diagnosis Date    Acute and chronic respiratory failure (acute-on-chronic) ([facility])     Acute blood loss anemia 7/1/2020    Acute deep vein thrombosis (DVT) of brachial vein of left upper extremity ([address] 2/20/2019    Formatting of this note might be different from the original. Dx February 2019    Anxiety disorder     Atherosclerotic heart disease of native coronary artery without angina pectoris     Bleeding hemorrhoids 6/29/2020    Cerebellar infarct ([address] 9/29/2019    Cerebral infarction ([address]     Chronic pain syndrome     Dependence on renal dialysis ([address]     Dependence on respirator ([facility])     End stage renal disease ([address]     Gastro-esophageal reflux disease with esophagitis, without bleeding     Insomnia     Major depressive disorder, recurrent ([address]     Morbid obesity due to excess calories ([address]     Muscle weakness     Obstructive sleep apnea (adult) (pediatric)     Other hyperlipidemia     Other voice and resonance disorders     Personal history of COVID-19     Pulmonary hypertension ([address]     Tracheostomy status ([address]     Type 2 diabetes mellitus without complications ([address]     Unspecified atrial fibrillation ([address]        Past Surgical History:    History reviewed. No pertinent surgical history.     Current Medications:   Current Facility-Administered Medications: ondansetron (ZOFRAN-ODT) 4 MG disintegrating tablet, , ,   Prior to Admission medications    Medication Sig Start Date End Date Taking? Authorizing Provider   warfarin (COUMADIN) 3 MG tablet Take 1 tablet by mouth nightly 4/30/22   Tami Boyle MD   hydrocortisone (ANUSOL-HC) 2.5 % CREA rectal cream Place rectally 2 times daily 3/3/22   Michael Petty DO   metoprolol succinate (TOPROL XL) 25 MG extended release tablet Take 0.5 tablets by mouth daily 2/22/22   Sandor Tejada MD   insulin glargine (LANTUS) 100 UNIT/ML injection vial Inject 15 Units into the skin daily    Historical Provider, MD   aspirin 81 MG EC tablet Take 81 mg by mouth daily    Historical Provider, MD   cyanocobalamin 1000 MCG tablet Take 500 mcg by mouth daily    Historical Provider, MD   QUEtiapine (SEROQUEL) 25 MG tablet Take 12.5 mg by mouth nightly    Historical Provider, MD   sodium polystyrene (KAYEXALATE) 15 GM/60ML suspension Take 15 g by mouth 4 times daily Sun, Mon, Wed, Fri    Historical Provider, MD   calcium acetate 667 MG TABS Take 1,334 mg by mouth 3 times daily (with meals)    Historical Provider, MD   hydrocortisone (PREPARATION H) 1 % cream Apply topically 2 times daily Apply topically 2 times daily.     Historical Provider, MD   acetaminophen (TYLENOL) 325 MG tablet Take 650 mg by mouth every 6 hours as needed for Pain    Historical Provider, MD   atorvastatin (LIPITOR) 80 MG tablet Take 80 mg by mouth at bedtime    Historical Provider, MD   diphenhydrAMINE (BENADRYL) 25 MG capsule Take 25 mg by mouth every 8 hours as needed     Historical Provider, MD   docusate sodium (COLACE) 100 MG capsule Take 100 mg by mouth 2 times daily    Historical Provider, MD   lactulose encephalopathy 10 GM/15ML SOLN solution Take 20 g by mouth daily as needed     Historical Provider, MD   polyethylene glycol (GLYCOLAX) 17 g packet Take 17 g by mouth daily as needed for Constipation Historical Provider, MD   insulin NPH (HUMULIN N;NOVOLIN N) 100 UNIT/ML injection vial Inject into the skin every 6 hours     Historical Provider, MD   ipratropium-albuterol (Mary Richer) 0.5-2.5 (3) MG/3ML SOLN nebulizer solution Inhale 1 vial into the lungs every 4 hours    Historical Provider, MD   furosemide (LASIX) 20 MG tablet Take 20 mg by mouth three times a week [facility]    Historical Provider, MD   loperamide (IMODIUM) 2 MG capsule Take 2 mg by mouth 4 times daily as needed for Diarrhea    Historical Provider, MD   midodrine (PROAMATINE) 5 MG tablet Take 10 mg by mouth three times a week Tues thurs sat pre dialysis    Historical Provider, MD   dextromethorphan-guaiFENesin (MUCINEX DM)  MG per extended release tablet Take 1 tablet by mouth 2 times daily     Historical Provider, MD   omeprazole (PRILOSEC) 20 MG delayed release capsule Take 20 mg by mouth daily    Historical Provider, MD   chlorhexidine (PERIDEX) 0.12 % solution Take 15 mLs by mouth 2 times daily    Historical Provider, MD   sennosides-docusate sodium (SENOKOT-S) 8.6-50 MG tablet Take 2 tablets by mouth at bedtime     Historical Provider, MD   sertraline (ZOLOFT) 50 MG tablet Take 50 mg by mouth daily    Historical Provider, MD   terazosin (HYTRIN) 1 MG capsule Take 1 mg by mouth nightly    Historical Provider, MD   ondansetron (ZOFRAN) 4 MG tablet Take 4 mg by mouth every 8 hours as needed for Nausea or Vomiting    Historical Provider, MD        Allergies:  Haloperidol lactate and Ziprasidone hcl    Social History:    reports that she has quit smoking. She has never used smokeless tobacco. She reports that she does not drink alcohol and does not use drugs. Family History:    History reviewed. No pertinent family history.     REVIEW OF SYSTEMS:  CONSTITUTIONAL:  positive for  fatigue and malaise  HEENT:  negative  RESPIRATORY:  negative  CARDIOVASCULAR:  negative  GASTROINTESTINAL:  negative except for nausea, vomiting, change in bowel habits and abdominal distention  GENITOURINARY:  Possible vaginal bleed  HEMATOLOGIC/LYMPHATIC:  negative  NEUROLOGICAL:  negative  SKIN: negative    PHYSICAL EXAM:  VITALS:  BP (!) 163/66   Pulse 90   Resp 20   SpO2 100%   24HR INTAKE/OUTPUT:  No intake or output data in the 24 hours ending 05/06/22 0736  DRAIN/TUBE OUTPUT:     CONSTITUTIONAL:  alert, mild distress and morbidly obese  EYES:  sclera clear  ENT:  normocepalic, without obvious abnormality  NECK:  supple, symmetrical, trachea midline and no carotid bruits  LUNGS:  clear to auscultation  CARDIOVASCULAR:  regular rate and rhythm  ABDOMEN:  scars noted healed in lower abdomen, hypoactive bowel sounds, firm, mildly distended and tympanitic, non-tender, voluntary guarding absent, no masses palpated, no hepatosplenomegally and hernia absent  MUSCULOSKELETAL:  2+ pitting edema lower extremities  NEUROLOGIC:  Mental Status Exam:  Level of Alertness:   awake  Orientation:   person, place, time  SKIN:  no bruising or bleeding and normal skin color, texture, turgor    DATA:    CBC:   Recent Labs     05/06/22 0513   WBC 12.7*   HGB 7.9*   HCT 25.6*   *     BMP:    Recent Labs     05/06/22 0513      K 4.7   CL 94*   CO2 26   BUN 63*   CREATININE 6.3*   GLUCOSE 135*     Hepatic:   Recent Labs     05/06/22 0513   AST 10*   ALT 7*   BILITOT 0.3   ALKPHOS 98     Mag:    No results for input(s): MG in the last 72 hours. Phos:   No results for input(s): PHOS in the last 72 hours. INR: No results for input(s): INR in the last 72 hours. LIPASE: No results for input(s): LIPASE in the last 72 hours. AMYLASE: No results for input(s): AMYLASE in the last 72 hours.      Radiology Review:      CT ABDOMEN PELVIS WO CONTRAST Additional Contrast? None    Result Date: 5/6/2022  EXAMINATION: CT OF THE ABDOMEN AND PELVIS WITHOUT CONTRAST 5/6/2022 4:26 am TECHNIQUE: CT of the abdomen and pelvis was performed without the administration of intravenous contrast. Multiplanar reformatted images are provided for review. Dose modulation, iterative reconstruction, and/or weight based adjustment of the mA/kV was utilized to reduce the radiation dose to as low as reasonably achievable. COMPARISON: None. HISTORY: ORDERING SYSTEM PROVIDED HISTORY: abd pain TECHNOLOGIST PROVIDED HISTORY: Reason for exam:->abd pain Additional Contrast?->None Decision Support Exception - unselect if not a suspected or confirmed emergency medical condition->Emergency Medical Condition (MA) Reason for Exam: abd pain Relevant Medical/Surgical History: Other (Patient has missed dialysis txs, states is in pain and wants meds, pt has trach. ) FINDINGS: Lower Chest: Calcified granulomatous changes are identified. No focal consolidation or pleural effusion is identified. No pericardial effusion. No distal esophageal thickening. Organs: No hypodense mass identified in the liver or spleen. No adrenal mass. No pancreatic mass. No peripancreatic inflammatory process. The gallbladder is unremarkable. Numerous cysts are identified within the kidneys bilaterally. Many of the cysts are simple fluid in density, however several are also not consistent with simple cysts. There is a 40 Hounsfield unit lesion in the lower pole the left kidney measuring 3.6 cm in size. GI/Bowel: Rectosigmoid distention is seen with a small to moderate volume of stool noted. No significant stool volume elsewhere within the colon. Minimal colonic diverticulosis is identified. Mildly dilated loops of small bowel seen in the left lower quadrant, with a focal transition noted posterior to a hernia mesh and appreciated on and around axial image [id number]. There is a small bowel feces sign noted just proximal to this transition point as well. Pelvis: No pelvic mass. Masslike appearance within the uterus. Calcified fibroids are noted. No free fluid. Peritoneum/Retroperitoneum: No aortic aneurysm is identified.   No retroperitoneal or mesenteric bulky lymphadenopathy. There is a omental fat containing hernia seen just above a prior anterior hernia repair. Bones/Soft Tissues: No acute subcutaneous soft tissue abnormality is identified. Diffuse osteopenia. Degenerative changes are seen in the spine and sacroiliac joints. No osseous destructive process is identified. 1. There is a partial small bowel obstruction, with a discrete transition point noted adjacent to hernia mesh within the anterior lower abdomen. Just proximal to the transition point, a small bowel feces sign is seen related to delayed transit. This is seen on and around axial image [id number]. 2. Mild to moderate stool volume noted with rectosigmoid colonic distention. No functional obstruction related to this however. 3. Several renal cysts are seen bilaterally, many of which appearing normal in density. However there are some more hyperdense lesions, including a 3.6 cm left lower pole lesion measuring 40 Hounsfield units. These would be best characterized with dedicated MRI of the kidneys per renal mass protocol to exclude the possibility of a renal neoplasm. 4. Masslike appearance within the uterus suggestive of underlying uterine leiomyomas, as noted on recent CT imaging of the pelvis on 03/03/2022. XR ABDOMEN FOR NG/OG/NE TUBE PLACEMENT    Result Date: 5/6/2022  EXAMINATION: ONE SUPINE XRAY VIEW(S) OF THE ABDOMEN 5/6/2022 6:55 am COMPARISON: None. HISTORY: ORDERING SYSTEM PROVIDED HISTORY: NG Tube Placement TECHNOLOGIST PROVIDED HISTORY: Reason for exam:->NG Tube Placement Portable? ->Yes Reason for Exam: NG Tube Placement FINDINGS: Exam is limited by patient body habitus and patient positioning Tip of ET tube projects at the level of the midthoracic trachea NG tube is seen. The tip appears to terminate in the region of the stomach Heart is enlarged.      Tip of NG tube projects in region of stomach       IMPRESSION/RECOMMENDATIONS:    Fecal constipation  pSBO  Severe chronic medical comorbidities    Combination of mechanical and functional issues  NG, NPO, IVF for SBO  Do SS Enemas today X 2 for stool load    Tl AXR in am    Area of small bowel adhesions around prior incision and mesh appear to be the abnormal small intestinal area. Hopefully will clear with medical treatment    Pt had concern of vaginal bleeding.  If noted during admit suggest having GYN evaluate given that concern and leiomyomas noted on CT    Will follow,      Pennie Drake MD

## 2022-05-06 NOTE — PROGRESS NOTES
Treatment time: 2 hrs     Net UF: 1.8 kg     Pre weight: 136.7 kg   Post weight: 134.7 kg   EDW: TBD     Access used: R AVG   Access function: Well     Medications or blood products given: Epogen; Albumin     Regular outpatient schedule: TTS    Summary of response to treatment: Pt tolerated tx well; once pt received pain meds    Copy of dialysis treatment record placed in chart, to be scanned into EMR.

## 2022-05-06 NOTE — SIGNIFICANT EVENT
PENDING ADMISSION     Patient is being admitted to the Hospitalist service , for   · Abd pain 2/2 SBO   · NGT placed in ED   · GSX called by ED   · NPO for now   · Renal cs in AM for ESRD on HD     However, Patient has NOT been seen personally or examined by me, and above information is per verbal sign outs given by ED attending to me and by Chart review information ONLY, and will be passed on for full admission to my On-coming Colleague in AM.     Only Generic admission orders and an Inpatient bed request/status request has been made, as per Hospital policy/requirements. My information about the patient is only limited to the Vitals, Labs and Imaging done and documented in chart during their ED Stay, and the pertinent History & Exam provided/signed out to me by the ED provider. Full H & P , Exam and evaluation and a focused Plan of care and Med Rec Reconciliation, will be made by the Oncoming Hospitalist Physician . Please refer to their notes and Plan of care for further orders. Marylou Ross MD    5/6/2022 6:34 AM    Hospitalist - [facility].

## 2022-05-06 NOTE — CONSULTS
Nephrology Consult Note  [phone]   [email or web address]        Reason for Consult:  ESKD    HISTORY OF PRESENT ILLNESS:                This is a patient with significant past medical history of ESKD on HD TTS at [facility], chronic resp failure, s/p trach, DM2, HTN, A.fib on coumadin, pulmonary HTN, CVA, who was recently hospitalized at [facility] for vaginal bleeding and discharged on 5/1/22. She presented one day prior with N/V/AP. Found ot have partial SBO per CT with mild to moderate stool volume. Additional it showed several kidney cysts with one hyperdense lesion 3.6cm left lower pole-dedicated MRI recommended. S/p NGT-states N/V better. Surgery is consulted. Past Medical History:        Diagnosis Date    Acute and chronic respiratory failure (acute-on-chronic) (HCC)     Acute blood loss anemia 7/1/2020    Acute deep vein thrombosis (DVT) of brachial vein of left upper extremity ([address] 2/20/2019    Formatting of this note might be different from the original. Dx February 2019    Anxiety disorder     Atherosclerotic heart disease of native coronary artery without angina pectoris     Bleeding hemorrhoids 6/29/2020    Cerebellar infarct ([address] 9/29/2019    Cerebral infarction ([address]     Chronic pain syndrome     Dependence on renal dialysis ([address]     Dependence on respirator (HCC)     End stage renal disease ([address]     Gastro-esophageal reflux disease with esophagitis, without bleeding     Insomnia     Major depressive disorder, recurrent ([address]     Morbid obesity due to excess calories ([address]     Muscle weakness     Obstructive sleep apnea (adult) (pediatric)     Other hyperlipidemia     Other voice and resonance disorders     Personal history of COVID-19     Pulmonary hypertension ([address]     Tracheostomy status ([address]     Type 2 diabetes mellitus without complications ([address]     Unspecified atrial fibrillation ([address]        Past Surgical History:    History reviewed.  No pertinent surgical history. Current Medications:    No current facility-administered medications on file prior to encounter. Current Outpatient Medications on File Prior to Encounter   Medication Sig Dispense Refill    warfarin (COUMADIN) 3 MG tablet Take 1 tablet by mouth nightly 30 tablet 2    hydrocortisone (ANUSOL-HC) 2.5 % CREA rectal cream Place rectally 2 times daily 1 each 0    metoprolol succinate (TOPROL XL) 25 MG extended release tablet Take 0.5 tablets by mouth daily 30 tablet 3    insulin glargine (LANTUS) 100 UNIT/ML injection vial Inject 15 Units into the skin daily      aspirin 81 MG EC tablet Take 81 mg by mouth daily      cyanocobalamin 1000 MCG tablet Take 500 mcg by mouth daily      QUEtiapine (SEROQUEL) 25 MG tablet Take 12.5 mg by mouth nightly      sodium polystyrene (KAYEXALATE) 15 GM/60ML suspension Take 15 g by mouth 4 times daily Sun, Mon, Wed, Fri      calcium acetate 667 MG TABS Take 1,334 mg by mouth 3 times daily (with meals)      hydrocortisone (PREPARATION H) 1 % cream Apply topically 2 times daily Apply topically 2 times daily.       acetaminophen (TYLENOL) 325 MG tablet Take 650 mg by mouth every 6 hours as needed for Pain      atorvastatin (LIPITOR) 80 MG tablet Take 80 mg by mouth at bedtime      diphenhydrAMINE (BENADRYL) 25 MG capsule Take 25 mg by mouth every 8 hours as needed       docusate sodium (COLACE) 100 MG capsule Take 100 mg by mouth 2 times daily      lactulose encephalopathy 10 GM/15ML SOLN solution Take 20 g by mouth daily as needed       polyethylene glycol (GLYCOLAX) 17 g packet Take 17 g by mouth daily as needed for Constipation      insulin NPH (HUMULIN N;NOVOLIN N) 100 UNIT/ML injection vial Inject into the skin every 6 hours       ipratropium-albuterol (DUONEB) 0.5-2.5 (3) MG/3ML SOLN nebulizer solution Inhale 1 vial into the lungs every 4 hours      furosemide (LASIX) 20 MG tablet Take 20 mg by mouth three times a week [facility]      loperamide (IMODIUM) 2 MG capsule Take 2 mg by mouth 4 times daily as needed for Diarrhea      midodrine (PROAMATINE) 5 MG tablet Take 10 mg by mouth three times a week Tues thurs sat pre dialysis      dextromethorphan-guaiFENesin (MUCINEX DM)  MG per extended release tablet Take 1 tablet by mouth 2 times daily       omeprazole (PRILOSEC) 20 MG delayed release capsule Take 20 mg by mouth daily      chlorhexidine (PERIDEX) 0.12 % solution Take 15 mLs by mouth 2 times daily      sennosides-docusate sodium (SENOKOT-S) 8.6-50 MG tablet Take 2 tablets by mouth at bedtime       sertraline (ZOLOFT) 50 MG tablet Take 50 mg by mouth daily      terazosin (HYTRIN) 1 MG capsule Take 1 mg by mouth nightly      ondansetron (ZOFRAN) 4 MG tablet Take 4 mg by mouth every 8 hours as needed for Nausea or Vomiting         Allergies:  Haloperidol lactate and Ziprasidone hcl    Social History:    Social History     Socioeconomic History    Marital status:      Spouse name: Not on file    Number of children: Not on file    Years of education: Not on file    Highest education level: Not on file   Occupational History    Not on file   Tobacco Use    Smoking status: Former Smoker    Smokeless tobacco: Never Used   Substance and Sexual Activity    Alcohol use: Never    Drug use: Never    Sexual activity: Not on file   Other Topics Concern    Not on file   Social History Narrative    Not on file     Social Determinants of Health     Financial Resource Strain:     Difficulty of Paying Living Expenses: Not on file   Food Insecurity:     Worried About Running Out of Food in the Last Year: Not on file    Volodymyr of Food in the Last Year: Not on file   Transportation Needs:     Lack of Transportation (Medical): Not on file    Lack of Transportation (Non-Medical):  Not on file   Physical Activity:     Days of Exercise per Week: Not on file    Minutes of Exercise per Session: Not on file   Stress:     Feeling of Stress : Not on file   Social Connections:     Frequency of Communication with Friends and Family: Not on file    Frequency of Social Gatherings with Friends and Family: Not on file    Attends Voodoo Services: Not on file    Active Member of Clubs or Organizations: Not on file    Attends Club or Organization Meetings: Not on file    Marital Status: Not on file   Intimate Partner Violence:     Fear of Current or Ex-Partner: Not on file    Emotionally Abused: Not on file    Physically Abused: Not on file    Sexually Abused: Not on file   Housing Stability:     Unable to Pay for Housing in the Last Year: Not on file    Number of [place] in the Last Year: Not on file    Unstable Housing in the Last Year: Not on file       Family History:   History reviewed. No pertinent family history. Review of Systems:  a comprehensive Review of systems was negative except as noted in HPI     PHYSICAL EXAM:    Vitals:    BP (!) 174/82   Pulse 92   Temp 97.8 °F (36.6 °C) (Axillary)   Resp 20   Wt (!) 301 lb 8 oz (136.8 kg)   SpO2 100%   BMI 51.75 kg/m²   No intake/output data recorded. No intake/output data recorded. Physical Exam:  Gen: Resting in bed, NAD.  obese  HEENT: anicteric, + trach  CV: RRR, + S1/S2,   Lungs: Good respiratory effort, scattered rhonchi   Abd: obese, NT  Ext: trace edema, no cyanosis  Skin: Warm. No rashes appreciated. : No TTP over bladder, nondistended.   Neuro: Alert and oriented x 3, nonfocal.  Left upper arm AV graft with good thrill    DATA:    CBC:   Lab Results   Component Value Date    WBC 12.7 05/06/2022    RBC 2.70 05/06/2022    HGB 7.9 05/06/2022    HCT 25.6 05/06/2022    MCV 94.9 05/06/2022    MCH 29.3 05/06/2022    MCHC 30.9 05/06/2022    RDW 18.2 05/06/2022     05/06/2022    MPV 7.2 05/06/2022     BMP:    Lab Results   Component Value Date     05/06/2022    K 4.7 05/06/2022    CL 94 05/06/2022    CO2 26 05/06/2022    BUN 63 05/06/2022 LABALBU 3.2 05/06/2022    CREATININE 6.3 05/06/2022    CALCIUM 9.6 05/06/2022    GFRAA 8 05/06/2022    LABGLOM 7 05/06/2022    GLUCOSE 135 05/06/2022       IMPRESSION/RECOMMENDATIONS:      ESKD: HD TTS at [facility] HD on 5/3-missed yesterday-will plan for HD today x 2 hours then TTS schedule    Partial SOB: s/p NGT-surgery consulted    Anemia: recent h/o vaginal bleeding-hgb is 7.9-denies bleeding currently-will resume KOKO with HD    HTN: not at goal, partly due to missed HD  -challenge UF as tolerated    Chronic resp failure: s/p trach    CKD-MBD; check phos    Thank you for allowing me to participate in the care of this patient. I will continue to follow along. Please call with questions.     Carina Ramos MD, MD

## 2022-05-06 NOTE — ED PROVIDER NOTES
Primary Care Physician: Eitan Valdovinos MD   Attending Physician: Yari Jeff MD     History   Chief Complaint   Patient presents with    Other     Patient has missed dialysis txs, states is in pain and wants meds, pt has trach. TEVIN Earl  is a 80 y.o. female history of end-stage renal disease on hemodialysis Tuesdays Thursdays and Saturdays, CVA, morbid obesity, obstructive sleep apnea, diabetes, resident of a skilled nursing facility who presents this morning brought by EMS complaining of abdominal pain associated with nausea vomiting. Patient stated that she did not go to dialysis because she had some pain on her board. This evening she developed pain of abdomen. Coming to the emergency room for evaluation. She denies any fevers chills chest pain or shortness of breath.     Past Medical History:   Diagnosis Date    Acute and chronic respiratory failure (acute-on-chronic) ([facility])     Acute blood loss anemia 7/1/2020    Acute deep vein thrombosis (DVT) of brachial vein of left upper extremity ([address] 2/20/2019    Formatting of this note might be different from the original. Dx February 2019    Anxiety disorder     Atherosclerotic heart disease of native coronary artery without angina pectoris     Bleeding hemorrhoids 6/29/2020    Cerebellar infarct ([address] 9/29/2019    Cerebral infarction ([address]     Chronic pain syndrome     Dependence on renal dialysis ([address]     Dependence on respirator (HCC)     End stage renal disease ([address]     Gastro-esophageal reflux disease with esophagitis, without bleeding     Insomnia     Major depressive disorder, recurrent ([address]     Morbid obesity due to excess calories ([address]     Muscle weakness     Obstructive sleep apnea (adult) (pediatric)     Other hyperlipidemia     Other voice and resonance disorders     Personal history of COVID-19     Pulmonary hypertension ([address]     Tracheostomy status ([address]     Type 2 diabetes mellitus without complications [facility]     Unspecified atrial fibrillation ([facility]         History reviewed. No pertinent surgical history. History reviewed. No pertinent family history. Social History     Socioeconomic History    Marital status:      Spouse name: None    Number of children: None    Years of education: None    Highest education level: None   Occupational History    None   Tobacco Use    Smoking status: Former Smoker    Smokeless tobacco: Never Used   Substance and Sexual Activity    Alcohol use: Never    Drug use: Never    Sexual activity: None   Other Topics Concern    None   Social History Narrative    None     Social Determinants of Health     Financial Resource Strain:     Difficulty of Paying Living Expenses: Not on file   Food Insecurity:     Worried About Running Out of Food in the Last Year: Not on file    Volodymyr of Food in the Last Year: Not on file   Transportation Needs:     Lack of Transportation (Medical): Not on file    Lack of Transportation (Non-Medical):  Not on file   Physical Activity:     Days of Exercise per Week: Not on file    Minutes of Exercise per Session: Not on file   Stress:     Feeling of Stress : Not on file   Social Connections:     Frequency of Communication with Friends and Family: Not on file    Frequency of Social Gatherings with Friends and Family: Not on file    Attends [employer] Services: Not on file    Active Member of [address] [place] [address] [employer] or Organizations: Not on file    Attends Club or Organization Meetings: Not on file    Marital Status: Not on file   Intimate Partner Violence:     Fear of Current or Ex-Partner: Not on file    Emotionally Abused: Not on file    Physically Abused: Not on file    Sexually Abused: Not on file   Housing Stability:     Unable to Pay for Housing in the Last Year: Not on file    Number of [place] in the Last Year: Not on file    Unstable Housing in the Last Year: Not on file        Review of Systems   10 total systems reviewed and found to be negative unless otherwise noted in HPI     Physical Exam   BP (!) 177/77   Pulse 93   Resp 20   SpO2 97%      CONSTITUTIONAL: Chronically ill appearing, in no acute distress   HEAD: atraumatic, normocephalic   EYES: PERRL, No injection, discharge or scleral icterus. ENT: Moist mucous membranes. NECK: Normal ROM, NO LAD   CARDIOVASCULAR: Regular rate and rhythm. No murmurs or gallop. PULMONARY/CHEST: Airway patent. No retractions. Breath sounds clear with good air entry bilaterally. ABDOMEN: Soft, Non-distended but tender, without guarding or rebound. SKIN: Acyanotic, warm, dry   MUSCULOSKELETAL: No swelling, tenderness or deformity   NEUROLOGICAL: Awake and oriented x 3. Pulses intact. Grossly nonfocal   Nursing note and vitals reviewed.      ED Course & Medical Decision Making   Medications   ondansetron (ZOFRAN-ODT) 4 MG disintegrating tablet (  Not Given 5/6/22 0557)   ondansetron (ZOFRAN-ODT) disintegrating tablet 4 mg (4 mg Oral Given 5/6/22 0554)      Labs Reviewed   CBC WITH AUTO DIFFERENTIAL - Abnormal; Notable for the following components:       Result Value    WBC 12.7 (*)     RBC 2.70 (*)     Hemoglobin 7.9 (*)     Hematocrit 25.6 (*)     MCHC 30.9 (*)     RDW 18.2 (*)     Platelets 438 (*)     Neutrophils Absolute 11.1 (*)     Lymphocytes Absolute 0.9 (*)     All other components within normal limits   COMPREHENSIVE METABOLIC PANEL W/ REFLEX TO MG FOR LOW K - Abnormal; Notable for the following components:    Chloride 94 (*)     Glucose 135 (*)     BUN 63 (*)     CREATININE 6.3 (*)     GFR Non- 7 (*)     GFR  8 (*)     Total Protein 8.5 (*)     Albumin 3.2 (*)     Albumin/Globulin Ratio 0.6 (*)     ALT 7 (*)     AST 10 (*)     All other components within normal limits    Narrative:     CALL  Mortensen  [facility] tel. [phone],  Chemistry results called to and read back by OSMIN Randhawa, 05/06/2022  05:45, by [facility]   TROPONIN - Abnormal; Notable for the following components:    Troponin 0.20 (*)     All other components within normal limits    Narrative:     Lazarus Norris  [facility] tel. [phone],  Chemistry results called to and read back by OSMIN Blair, 05/06/2022  05:45, by Kishore Jamison - Abnormal; Notable for the following components:    Pro-BNP 31,862 (*)     All other components within normal limits    Narrative:     Lazarus App [facility] tel. [phone],  Chemistry results called to and read back by OSMIN Blair, 05/06/2022  05:45, by [facility]      CT ABDOMEN PELVIS WO CONTRAST Additional Contrast? None   Final Result   1. There is a partial small bowel obstruction, with a discrete transition   point noted adjacent to hernia mesh within the anterior lower abdomen. Just   proximal to the transition point, a small bowel feces sign is seen related to   delayed transit. This is seen on and around axial image 136-144.   2. Mild to moderate stool volume noted with rectosigmoid colonic distention. No functional obstruction related to this however. 3. Several renal cysts are seen bilaterally, many of which appearing normal   in density. However there are some more hyperdense lesions, including a 3.6   cm left lower pole lesion measuring 40 Hounsfield units. These would be best   characterized with dedicated MRI of the kidneys per renal mass protocol to   exclude the possibility of a renal neoplasm. 4. Masslike appearance within the uterus suggestive of underlying uterine   leiomyomas, as noted on recent CT imaging of the pelvis on 03/03/2022. XR ANKLE RIGHT (MIN 3 VIEWS)    Result Date: 4/26/2022  EXAMINATION: THREE XRAY VIEWS OF THE RIGHT ANKLE 4/26/2022 8:54 pm COMPARISON: None. HISTORY: ORDERING SYSTEM PROVIDED HISTORY: pain TECHNOLOGIST PROVIDED HISTORY: please include foot Reason for exam:->pain Reason for Exam: Foot and ankle pain. No known injury.  Best images possible as patient would not follow commands. FINDINGS: Severe osteopenia limits evaluation. There are moderate to severe degenerative changes with narrowing of the joint space and osteophytosis. No gross fracture or dislocation. Extensive soft tissue vascular calcifications. No acute fracture identified     XR ANKLE RIGHT (MIN 3 VIEWS)    Result Date: 4/26/2022  EXAMINATION: THREE XRAY VIEWS OF THE RIGHT ANKLE 4/26/2022 1:07 pm COMPARISON: None. HISTORY: ORDERING SYSTEM PROVIDED HISTORY: swelling TECHNOLOGIST PROVIDED HISTORY: Reason for exam:->swelling Reason for Exam: ankle pain, swelling FINDINGS: AP lateral and lateral oblique views of the right ankle reveal diffuse osteopenia without acute displaced fracture however degenerative changes at the tibiotalar joint space and within the midfoot however no acute displaced fracture     No acute displaced fracture with grossly anatomic alignment however advanced degenerative changes of the tibiotalar joint space and osteopenia limiting evaluation for subtle cortical defect     XR FOOT RIGHT (2 VIEWS)    Result Date: 4/26/2022  EXAMINATION: 2 XRAY VIEWS OF THE RIGHT FOOT 4/26/2022 8:54 pm COMPARISON: None. HISTORY: ORDERING SYSTEM PROVIDED HISTORY: pain TECHNOLOGIST PROVIDED HISTORY: Reason for exam:->pain Reason for Exam: Foot and ankle pain. No known injury. Best images possible as patient would not follow commands. FINDINGS: There is no evidence of acute fracture. There is normal alignment of the tarsometatarsal joints. No acute joint abnormality. No focal osseous lesion. No focal soft tissue abnormality. Diffuse osteopenia. No acute osseous abnormality. US PELVIS COMPLETE    Result Date: 4/27/2022  EXAMINATION: PELVIC ULTRASOUND 4/27/2022 TECHNIQUE: Transabdominal pelvic ultrasound was performed.  COMPARISON: None HISTORY: ORDERING SYSTEM PROVIDED HISTORY: postmenopausal vaginal bleeding on anticoagulation TECHNOLOGIST PROVIDED HISTORY: Reason for exam:->postmenopausal vaginal bleeding on anticoagulation Reason for exam:->known fibroids FINDINGS: Measurements: Not able for visualization Ultrasound Findings: Nonvisualization     Nondiagnostic evaluation. XR CHEST PORTABLE    Result Date: 4/30/2022  EXAMINATION: ONE XRAY VIEW OF THE CHEST 4/30/2022 1:47 pm COMPARISON: 04/26/2022 HISTORY: ORDERING SYSTEM PROVIDED HISTORY: dislodged trach; mucous plugging TECHNOLOGIST PROVIDED HISTORY: Reason for exam:->dislodged trach; mucous plugging Reason for Exam: Dislodged trach; mucous plugging FINDINGS: Tracheostomy tube remains in place. The lungs are without acute focal process. There is no effusion or pneumothorax. The cardiomediastinal silhouette is stable. The osseous structures are stable. No acute process. Bibasilar hypoaeration     XR CHEST PORTABLE    Result Date: 4/26/2022  EXAMINATION: ONE XRAY VIEW OF THE CHEST 4/26/2022 3:15 pm COMPARISON: Chest x-ray dated 02/21/2022. HISTORY: ORDERING SYSTEM PROVIDED HISTORY: wbc TECHNOLOGIST PROVIDED HISTORY: Reason for exam:->wbc Reason for Exam: elevated white blood count FINDINGS: LINES/TUBES/OTHER: Tracheostomy tube is again noted. HEART/MEDIASTINUM: The cardiac silhouette is enlarged, but stable. PLEURA/LUNGS: There are no focal consolidations or pleural effusions. There is no appreciable pneumothorax. BONES/SOFT TISSUE: No acute abnormality. No radiographic evidence of acute pulmonary disease. EKG INTERPRETATION:  EKG by my preliminary interpretation shows sinus rhythm with rate of 80, normal axis, normal intervals, with no ST changes indicative of ischemia at this time.     PROCEDURES:   Procedures    ASSESSMENT AND PLAN:  [id number] is a 80 y.o. female history of end-stage renal disease on hemodialysis Tuesdays Thursdays and Saturdays, CVA, morbid obesity, obstructive sleep apnea, diabetes, resident of a skilled nursing facility who presents this morning brought by EMS complaining of abdominal pain associated with nausea vomiting. Patient stated that she did not go to dialysis because she had some pain on her butt. This evening she developed pain of abdomen. On exam patient appears nontoxic however she ended to palpation around the umbilicus. She does have a trach collar but no increased oxygen requirement. Given her presentation I was concerned for surgical abdomen versus nausea vomiting secondary to electrolyte abnormalities given the fact the patient failed dialysis. So far her CMP showed no significant acute abnormality, failure to her previous encounters. I did obtain a CT of the abdomen and pelvic which showed small bowel obstruction with transition point around the umbilicus. Surgery has been consulted pending admission. An NG tube is being placed. Given her multiple medical conditions the hospitalist has been consulted for admission. I believe patient will be medically managed and if no resolution possible surgical intervention at that time. ClINICAL IMPRESSION:  1. SBO (small bowel obstruction) ([facility]        DISPOSITION Decision To Admit 05/06/2022 05:52:33 AM   -Findings and recommendations explained to patient. She expressed understanding and agreed with the plan.    ___________________________________________________________________________________  _________________________________________________________________________________________  This record is transcribed utilizing voice recognition technology. There are inherent limitations in this technology. In addition, there may be limitations in editing of this report. If there are any discrepancies, please contact me directly.         Elizabeth Rasheed MD  05/06/22 5700

## 2022-05-06 NOTE — CONSULTS
PALLIATIVE MEDICINE CONSULTATION     Patient name:Jennifer Acevedo   [facility]:[id number]    :1950  Room/Bed:[address] [place], [place] [address]   LOS: 0 days         Date of consult:2022    Consult Information  Palliative Medicine Consult performed by: ITALO Hastings CNP, CNP        ASSESSMENT/RECOMMENDATIONS     80 y.o. female with Dyspnea and abdominal pain      Symptom Management:  Dyspnea- pt with trach at baseline breathing is stable today  Abdominal pain- this is a chronic issue causing multiple hospitalizations pt has partial SBO on imaging   Goals of Care- pt is alert and oriented and has capacity to make her own decisions her daughter Jeffrey Loredo is her HCPOA. Pt is clear that she wants all aggressive care, including surgeries, her goal is to stay alive in any capacity she understands that she is a poor candidate for aggressive life saving measures. Pt remains FULL code. She is open to any surgery she may need. Patient/Family Goals of Care :    pt is alert and oriented and has capacity to make her own decisions her daughter Jeffrey Loredo is her HCPOA. Pt is clear that she wants all aggressive care, including surgeries,  her goal is to stay alive in any capacity she understands that she is a poor candidate for aggressive life saving measures. Pt remains FULL code. Disposition/Discharge Plan:   Pending    Advance Directives:  Surrogate Decision Maker: Keisha-child  Code status:  Full Code    Case discussed with: patient, floor RN  Thank you for allowing us to participate in the care of this patient. HISTORY     CC: Leg pain  HPI: The patient is a 80 y.o. female with significant past medical history of ESRD on maintenance hemodialysis, obstructive sleep apnea with a tracheostomy, atrial fibrillation, type 2 diabetes mellitus, pulmonary hypertension, CVA, GERD, presented to the ED in [address] [place] [address] with vaginal bleeding of unknown duration.     Palliative Medicine SymptomScreening/ROS:    Review of Systems Constitutional: Positive for appetite change and fatigue. HENT: Positive for drooling and trouble swallowing. Respiratory: Positive for cough, chest tightness and shortness of breath. Gastrointestinal: Positive for abdominal pain and constipation. Musculoskeletal: Positive for arthralgias. Neurological: Positive for weakness. All other systems reviewed and are negative. Palliative Performance Scale:     [] 60%  Amb reduced; Sig dz. Can't do hobbies/housework; Intake normal or reduced, Occasional assist; LOC full/confusion   [] 50%  Mainly sit/lie; Extensive disease. Mainly assist, Intake normal or reduced; Occasional assist; LOC full/confusion   [x] 40%  Mainly in bed; Extensive disease; Mainly assist; Intake normal or reduced; Occasional assist; LOC full/confusion   [] 30%  Bed bound, Extensive disease; Total care; Intake reduced; LOC full/confusion   [] 20%  Bed bound; Extensive disease; Total care; Intake minimal; Drowsy/coma   [] 10%  Bed bound; Extensive disease; Total care; Mouth care only; Drowsy/coma   []  0%   Death       Home med list and hospital medications reviewed in chart as of 5/6/2022     EXAM     Vitals:    05/06/22 0830   BP: (!) 174/82   Pulse: 92   Resp: 20   Temp: 97.8 °F (36.6 °C)   SpO2: 100%       Physical Exam  Constitutional:       General: She is not in acute distress. Appearance: She is ill-appearing. HENT:      Head: Normocephalic and atraumatic. Nose: No congestion. Mouth/Throat:      Mouth: Mucous membranes are dry. Comments: Trach in place  Eyes:      Pupils: Pupils are equal, round, and reactive to light. Cardiovascular:      Rate and Rhythm: Normal rate. Heart sounds: No murmur heard. No friction rub. No gallop. Pulmonary:      Effort: No respiratory distress. Abdominal:      General: There is distension. Palpations: Abdomen is soft. Tenderness: There is abdominal tenderness.    Musculoskeletal:      Cervical back: Normal range of motion. Right lower leg: Edema present. Left lower leg: Edema present. Skin:     General: Skin is warm and dry. Findings: Bruising present. Neurological:      General: No focal deficit present. Mental Status: She is alert. Motor: Weakness present. Psychiatric:         Mood and Affect: Mood normal.         Behavior: Behavior normal.         Thought Content:  Thought content normal.         Judgment: Judgment normal.                Current labs in the epic chart reviewed as of 5/6/2022   Review of previous notes, admits, labs, radiology and testing relevant to this consult done in this chart today 5/6/2022      Total time: 70 minutes  >50% of time spent counseling patient at bedside or POA/family member if applicable , reviewing information and discussing care, coordinating with care team  Signed By: Electronically signed by ITALO Rios CNP on 5/6/2022 at 11:31 AM  Palliative Medicine   [phone]    May 6, 2022

## 2022-05-06 NOTE — PLAN OF CARE
Problem: Discharge Planning  Goal: Discharge to home or other facility with appropriate resources  Outcome: Progressing     Problem: Pain  Goal: Verbalizes/displays adequate comfort level or baseline comfort level  Outcome: Progressing     Problem: Skin/Tissue Integrity  Goal: Absence of new skin breakdown  Description: 1. Monitor for areas of redness and/or skin breakdown  2. Assess vascular access sites hourly  3. Every 4-6 hours minimum:  Change oxygen saturation probe site  4. Every 4-6 hours:  If on nasal continuous positive airway pressure, respiratory therapy assess nares and determine need for appliance change or resting period.   Outcome: Progressing

## 2022-05-06 NOTE — H&P
HOSPITALISTS HISTORY AND PHYSICAL    5/6/2022 10:38 AM    Patient Information:  Gibran Schmid is a 80 y.o. female [id number]  PCP:  Rogers Alex MD (Tel: [phone] )    Chief complaint:    Chief Complaint   Patient presents with    Other     Patient has missed dialysis txs, states is in pain and wants meds, pt has trach. History of Present Illness:  Carlos Burks is a 80 y.o. female with history of ESRD on HD, chronic respiratory failure s/p trach, h/o stroke, morbid obesity, DM2, pulmonary HTN, afib on Coumadin was brought to ER for abdominal pain, nausea with vomiting and SOB from [facility]. Found to have SBO in ED. Says she had small BM and flatus. No CP, HA or fevers. Denies cough. Constipated. No diarrhea or melena. Denies dysphagia. Missed HD. Otherwise complete ROS is negative unless listed above. REVIEW OF SYSTEMS:   Pertinent positives as noted in HPI. All other systems were reviewed and are negative. \    Past Medical History:   has a past medical history of Acute and chronic respiratory failure (acute-on-chronic) (HCC), Acute blood loss anemia, Acute deep vein thrombosis (DVT) of brachial vein of left upper extremity ([address] Anxiety disorder, Atherosclerotic heart disease of native coronary artery without angina pectoris, Bleeding hemorrhoids, Cerebellar infarct ([address] Cerebral infarction ([address] Chronic pain syndrome, Dependence on renal dialysis ([address] Dependence on respirator ([address] End stage renal disease ([address] Gastro-esophageal reflux disease with esophagitis, without bleeding, Insomnia, Major depressive disorder, recurrent ([address] Morbid obesity due to excess calories ([address] Muscle weakness, Obstructive sleep apnea (adult) (pediatric), Other hyperlipidemia, Other voice and resonance disorders, Personal history of COVID-19, Pulmonary hypertension ([facility] Tracheostomy status ([facility] Type 2 diabetes mellitus without complications ([facility] and Unspecified atrial fibrillation ([facility] Past Surgical History:   has no past surgical history on file. Medications:  No current facility-administered medications on file prior to encounter. Current Outpatient Medications on File Prior to Encounter   Medication Sig Dispense Refill    warfarin (COUMADIN) 3 MG tablet Take 1 tablet by mouth nightly 30 tablet 2    hydrocortisone (ANUSOL-HC) 2.5 % CREA rectal cream Place rectally 2 times daily 1 each 0    metoprolol succinate (TOPROL XL) 25 MG extended release tablet Take 0.5 tablets by mouth daily 30 tablet 3    insulin glargine (LANTUS) 100 UNIT/ML injection vial Inject 15 Units into the skin daily      aspirin 81 MG EC tablet Take 81 mg by mouth daily      cyanocobalamin 1000 MCG tablet Take 500 mcg by mouth daily      QUEtiapine (SEROQUEL) 25 MG tablet Take 12.5 mg by mouth nightly      sodium polystyrene (KAYEXALATE) 15 GM/60ML suspension Take 15 g by mouth 4 times daily Sun, Mon, Wed, Fri      calcium acetate 667 MG TABS Take 1,334 mg by mouth 3 times daily (with meals)      hydrocortisone (PREPARATION H) 1 % cream Apply topically 2 times daily Apply topically 2 times daily.       acetaminophen (TYLENOL) 325 MG tablet Take 650 mg by mouth every 6 hours as needed for Pain      atorvastatin (LIPITOR) 80 MG tablet Take 80 mg by mouth at bedtime      diphenhydrAMINE (BENADRYL) 25 MG capsule Take 25 mg by mouth every 8 hours as needed       docusate sodium (COLACE) 100 MG capsule Take 100 mg by mouth 2 times daily      lactulose encephalopathy 10 GM/15ML SOLN solution Take 20 g by mouth daily as needed       polyethylene glycol (GLYCOLAX) 17 g packet Take 17 g by mouth daily as needed for Constipation      insulin NPH (HUMULIN N;NOVOLIN N) 100 UNIT/ML injection vial Inject into the skin every 6 hours       ipratropium-albuterol (DUONEB) 0.5-2.5 (3) MG/3ML SOLN nebulizer solution Inhale 1 vial into the lungs every 4 hours      furosemide (LASIX) 20 MG tablet Take 20 mg by mouth three times a week MWF      loperamide (IMODIUM) 2 MG capsule Take 2 mg by mouth 4 times daily as needed for Diarrhea      midodrine (PROAMATINE) 5 MG tablet Take 10 mg by mouth three times a week Tues thurs sat pre dialysis      dextromethorphan-guaiFENesin (MUCINEX DM)  MG per extended release tablet Take 1 tablet by mouth 2 times daily       omeprazole (PRILOSEC) 20 MG delayed release capsule Take 20 mg by mouth daily      chlorhexidine (PERIDEX) 0.12 % solution Take 15 mLs by mouth 2 times daily      sennosides-docusate sodium (SENOKOT-S) 8.6-50 MG tablet Take 2 tablets by mouth at bedtime       sertraline (ZOLOFT) 50 MG tablet Take 50 mg by mouth daily      terazosin (HYTRIN) 1 MG capsule Take 1 mg by mouth nightly      ondansetron (ZOFRAN) 4 MG tablet Take 4 mg by mouth every 8 hours as needed for Nausea or Vomiting         Allergies: Allergies   Allergen Reactions    Haloperidol Lactate      Other reaction(s): Unknown (See Comments)  PT DOESN'T REMEMBER  Other reaction(s): Unknown (See Comments)  PT DOESN'T REMEMBER      Ziprasidone Hcl      Other reaction(s): Other (See Comments), Unknown (See Comments)  PT DOESN'T REMEMBER  PT DOESN'T REMEMBER  Other reaction(s): Unknown (See Comments)  PT DOESN'T REMEMBER          Social History:  Patient Lives at [facility]   reports that she has quit smoking. She has never used smokeless tobacco. She reports that she does not drink alcohol and does not use drugs. Family History:  family history is not on file. Physical Exam:  BP (!) 174/82   Pulse 92   Temp 97.8 °F (36.6 °C) (Axillary)   Resp 20   Wt (!) 301 lb 8 oz (136.8 kg)   SpO2 100%   BMI 51.75 kg/m²     General appearance:  Appears comfortable. Well nourished, obese  Eyes: Sclera clear, pupils equal  ENT: Moist mucus membranes, no thrush. Tracheostomy  Cardiovascular: Regular rhythm, normal S1, S2. No murmur, gallop, rub. No edema in lower extremities  Respiratory: Clear to auscultation bilaterally, no wheeze, good inspiratory effort  Gastrointestinal: Abdomen soft, non-tender, obese, mildly distended, normal bowel sounds  Musculoskeletal: No cyanosis in digits, neck supple  Neurology:  Grossly intact  Psychiatry: Appropriate affect. Not agitated  Skin: Warm, dry, normal turgor, no rash  Brisk capillary refill, peripheral pulses palpable   Labs:  CBC:   Lab Results   Component Value Date    WBC 12.7 05/06/2022    RBC 2.70 05/06/2022    HGB 7.9 05/06/2022    HCT 25.6 05/06/2022    MCV 94.9 05/06/2022    MCH 29.3 05/06/2022    MCHC 30.9 05/06/2022    RDW 18.2 05/06/2022     05/06/2022    MPV 7.2 05/06/2022     BMP:    Lab Results   Component Value Date     05/06/2022    K 4.7 05/06/2022    CL 94 05/06/2022    CO2 26 05/06/2022    BUN 63 05/06/2022    CREATININE 6.3 05/06/2022    CALCIUM 9.6 05/06/2022    GFRAA 8 05/06/2022    LABGLOM 7 05/06/2022    GLUCOSE 135 05/06/2022     XR ABDOMEN FOR NG/OG/NE TUBE PLACEMENT   Final Result   Tip of NG tube projects in region of stomach         CT ABDOMEN PELVIS WO CONTRAST Additional Contrast? None   Final Result   1. There is a partial small bowel obstruction, with a discrete transition   point noted adjacent to hernia mesh within the anterior lower abdomen. Just   proximal to the transition point, a small bowel feces sign is seen related to   delayed transit. This is seen on and around axial image [id number].   2. Mild to moderate stool volume noted with rectosigmoid colonic distention. No functional obstruction related to this however. 3. Several renal cysts are seen bilaterally, many of which appearing normal   in density. However there are some more hyperdense lesions, including a 3.6   cm left lower pole lesion measuring 40 Hounsfield units.   These would be best   characterized with dedicated MRI of the kidneys per renal mass protocol to   exclude the possibility of a renal neoplasm. 4. Masslike appearance within the uterus suggestive of underlying uterine   leiomyomas, as noted on recent CT imaging of the pelvis on 03/03/2022. XR ABDOMEN (2 VIEWS)    (Results Pending)         Problem List  Principal Problem:    SBO (small bowel obstruction) ([facility])  Active Problems:    ESRD on dialysis ([address]    Chronic respiratory failure requiring continuous mechanical ventilation through tracheostomy ([address]    History of CVA (cerebrovascular accident)    Anemia, chronic disease    Chronic diastolic heart failure ([address]    Essential hypertension    GERD (gastroesophageal reflux disease)    Morbid obesity with BMI of 50.0-59.9, adult ([facility])    SILVER (obstructive sleep apnea)  Resolved Problems:    * No resolved hospital problems. *        Assessment/Plan:   1. NPO  2. IVF  3. Hold Coumadin  4. Surgery consult for SBO  5. Renal consult for ESRD   6. NGT to LIWS  7. Morphine IV PRN pain  8. Follow INR  9. PT/OT eval  10. SW consult for DC planning  11. Palliative care consult for goals of care      DVT prophylaxis Hep SQ  Code status Full code  Diet NPO  IV access Peripheral   Arevalo Catheter No    Admit as inpatient. I anticipate hospitalization spanning more than two midnights for investigation and treatment of the above medically necessary diagnoses. Discussed with patient, Dr Colorado Level (Renal), nursing and CM. Back to LTAC once SBO resolved.     Devaughn Harrison MD    5/6/2022 10:38 AM

## 2022-05-06 NOTE — ED NOTES
Pt. Transported to floor with RN and ED tech, bedside report to [employer].      Nilesh Wolf RN  05/06/22 5678

## 2022-05-06 NOTE — PROGRESS NOTES
DIT VASCULAR ACCESS SERVICES    1500:  Arrived to facility to place PIV as requested; pt is currently in HD and will return in a couple of hours; will follow up    1600:  Pt remains in HD at this time; will follow up    1726:  Return to place PIV as ordered and pt has returned to her room from HD; US assessment done of L arm only d/t R Fistula in place; pt is L arm only; after thorough assessment of her upper and lower arm, there are minimal vessels that are appropriate for IV access; some vessels in the lower arm are suspicious of thrombus formation visualized; THERE ARE NO VESSELS IN THE L UPPER ARM TO EVEN CONSIDER FOR MIDLINE PLACEMENT    I was ultimately successful w PIV placement to the L Brachial vein just above the AC; #20 gu 1.75 in PIV placed; line is patent w brisk blood return and flushes easily wo resistance; d/t the location of the PIV and pt body habitus, I am not confident that this PIV will last very long d/t arm movement; pt is left in a position of comfort w siderails up x3, call light in reach and bed is locked in lowest position; reported successful PIV placement to primary RN Juan    MY RECOMMENDATION:  IJ or FEM CVC for vascular access     KALIA Gaxiola RN, BSN, [facility].

## 2022-05-06 NOTE — ED NOTES
Patient called out stating she couldn't breath. O2 increased from 5L to 6L, O2 sat 99% at this time. MD made aware.       Barrett Jones, OSMIN  05/06/22 0918

## 2022-05-06 NOTE — CARE COORDINATION
Discharge Planning Assessment   discharge planner reviewed the patient's chart for reason for admission, current living situation, and potential needs at the time of discharge    Demographics/Insurance verified Yes    Current type of dwelling: The patient is currently a resident at [facility]    Patient from ECF/ confirmed with: Confirmed with Gisel and Jaren [phone]    Tentative discharge plan:    The plan is for the patient to return to [facility]. The patient is a [address] [place] [address] resident. The patient will need a COVID test to return. Transportation at the time of discharge: Medical Transport.      Electronically signed by EDMUNDO Gilmore on 5/6/2022 at 3:11 PM

## 2022-05-07 ENCOUNTER — APPOINTMENT (OUTPATIENT)
Dept: GENERAL RADIOLOGY | Age: 72
DRG: 335 | End: 2022-05-07
Payer: COMMERCIAL

## 2022-05-07 LAB
ANION GAP SERPL CALCULATED.3IONS-SCNC: 16 MMOL/L (ref 3–16)
BUN BLDV-MCNC: 41 MG/DL (ref 7–20)
CALCIUM SERPL-MCNC: 9.4 MG/DL (ref 8.3–10.6)
CHLORIDE BLD-SCNC: 94 MMOL/L (ref 99–110)
CO2: 28 MMOL/L (ref 21–32)
CREAT SERPL-MCNC: 4.5 MG/DL (ref 0.6–1.2)
ESTIMATED AVERAGE GLUCOSE: 108.3 MG/DL
GFR AFRICAN AMERICAN: 12
GFR NON-AFRICAN AMERICAN: 10
GLUCOSE BLD-MCNC: 102 MG/DL (ref 70–99)
GLUCOSE BLD-MCNC: 103 MG/DL (ref 70–99)
GLUCOSE BLD-MCNC: 107 MG/DL (ref 70–99)
GLUCOSE BLD-MCNC: 109 MG/DL (ref 70–99)
GLUCOSE BLD-MCNC: 114 MG/DL (ref 70–99)
GLUCOSE BLD-MCNC: 117 MG/DL (ref 70–99)
GLUCOSE BLD-MCNC: 79 MG/DL (ref 70–99)
HBA1C MFR BLD: 5.4 %
HCT VFR BLD CALC: 25.1 % (ref 36–48)
HEMOGLOBIN: 8 G/DL (ref 12–16)
MCH RBC QN AUTO: 30.7 PG (ref 26–34)
MCHC RBC AUTO-ENTMCNC: 31.8 G/DL (ref 31–36)
MCV RBC AUTO: 96.6 FL (ref 80–100)
PDW BLD-RTO: 18.8 % (ref 12.4–15.4)
PERFORMED ON: ABNORMAL
PERFORMED ON: NORMAL
PLATELET # BLD: 433 K/UL (ref 135–450)
PMV BLD AUTO: 7.3 FL (ref 5–10.5)
POTASSIUM SERPL-SCNC: 4.2 MMOL/L (ref 3.5–5.1)
RBC # BLD: 2.6 M/UL (ref 4–5.2)
SODIUM BLD-SCNC: 138 MMOL/L (ref 136–145)
WBC # BLD: 10.3 K/UL (ref 4–11)

## 2022-05-07 PROCEDURE — 85027 COMPLETE CBC AUTOMATED: CPT

## 2022-05-07 PROCEDURE — 80048 BASIC METABOLIC PNL TOTAL CA: CPT

## 2022-05-07 PROCEDURE — 6360000002 HC RX W HCPCS: Performed by: NURSE PRACTITIONER

## 2022-05-07 PROCEDURE — 99232 SBSQ HOSP IP/OBS MODERATE 35: CPT | Performed by: SURGERY

## 2022-05-07 PROCEDURE — 74019 RADEX ABDOMEN 2 VIEWS: CPT

## 2022-05-07 PROCEDURE — 1200000000 HC SEMI PRIVATE

## 2022-05-07 PROCEDURE — 6360000002 HC RX W HCPCS: Performed by: HOSPITALIST

## 2022-05-07 PROCEDURE — 2500000003 HC RX 250 WO HCPCS: Performed by: NURSE PRACTITIONER

## 2022-05-07 PROCEDURE — 90935 HEMODIALYSIS ONE EVALUATION: CPT

## 2022-05-07 PROCEDURE — 2580000003 HC RX 258: Performed by: HOSPITALIST

## 2022-05-07 PROCEDURE — 2580000003 HC RX 258: Performed by: INTERNAL MEDICINE

## 2022-05-07 PROCEDURE — 2060000000 HC ICU INTERMEDIATE R&B

## 2022-05-07 PROCEDURE — 6360000002 HC RX W HCPCS: Performed by: INTERNAL MEDICINE

## 2022-05-07 RX ORDER — HYDROMORPHONE HYDROCHLORIDE 1 MG/ML
0.5 INJECTION, SOLUTION INTRAMUSCULAR; INTRAVENOUS; SUBCUTANEOUS
Status: DISCONTINUED | OUTPATIENT
Start: 2022-05-07 | End: 2022-05-14

## 2022-05-07 RX ORDER — LORAZEPAM 2 MG/ML
0.5 INJECTION INTRAMUSCULAR EVERY 12 HOURS PRN
Status: DISCONTINUED | OUTPATIENT
Start: 2022-05-07 | End: 2022-05-21 | Stop reason: HOSPADM

## 2022-05-07 RX ORDER — METOPROLOL TARTRATE 5 MG/5ML
5 INJECTION INTRAVENOUS EVERY 6 HOURS PRN
Status: DISCONTINUED | OUTPATIENT
Start: 2022-05-07 | End: 2022-05-21 | Stop reason: HOSPADM

## 2022-05-07 RX ADMIN — ONDANSETRON 4 MG: 2 INJECTION INTRAMUSCULAR; INTRAVENOUS at 05:22

## 2022-05-07 RX ADMIN — HYDROMORPHONE HYDROCHLORIDE 0.25 MG: 1 INJECTION, SOLUTION INTRAMUSCULAR; INTRAVENOUS; SUBCUTANEOUS at 05:23

## 2022-05-07 RX ADMIN — Medication 10 ML: at 09:34

## 2022-05-07 RX ADMIN — METOPROLOL TARTRATE 5 MG: 5 INJECTION INTRAVENOUS at 20:21

## 2022-05-07 RX ADMIN — Medication 10 ML: at 09:35

## 2022-05-07 RX ADMIN — DIPHENHYDRAMINE HYDROCHLORIDE 25 MG: 50 INJECTION, SOLUTION INTRAMUSCULAR; INTRAVENOUS at 20:21

## 2022-05-07 RX ADMIN — HYDROMORPHONE HYDROCHLORIDE 0.5 MG: 1 INJECTION, SOLUTION INTRAMUSCULAR; INTRAVENOUS; SUBCUTANEOUS at 14:46

## 2022-05-07 RX ADMIN — HYDROMORPHONE HYDROCHLORIDE 0.5 MG: 1 INJECTION, SOLUTION INTRAMUSCULAR; INTRAVENOUS; SUBCUTANEOUS at 20:21

## 2022-05-07 RX ADMIN — Medication 10 ML: at 20:22

## 2022-05-07 RX ADMIN — LORAZEPAM 0.5 MG: 2 INJECTION INTRAMUSCULAR; INTRAVENOUS at 18:00

## 2022-05-07 ASSESSMENT — PAIN DESCRIPTION - LOCATION
LOCATION: ABDOMEN
LOCATION: ABDOMEN;THROAT

## 2022-05-07 ASSESSMENT — PAIN SCALES - GENERAL
PAINLEVEL_OUTOF10: 9
PAINLEVEL_OUTOF10: 0
PAINLEVEL_OUTOF10: 7
PAINLEVEL_OUTOF10: 8

## 2022-05-07 NOTE — PROGRESS NOTES
This nurse was called to pts room by another nurse to observe pts ng tube completely out of nares. This nurse and second nurse re inserted ng tube and confirmed placement via xray. Xray showed catheter in stomach but informed to advance 5 more for better placement. This nurse advanced catheter to 79, and hooked pt back up to low suction. Pt tolerated well. No concerns noted at this time.

## 2022-05-07 NOTE — PROGRESS NOTES
Nephrology Progress Note  [facility]  [phone]   [email or web address]    Patient:  Luke Godinez   : 1950    CC:  ESRD    Subjective:  No new complaints. Awaiting HD today. Abdomen still uncomfortable after decompression. ROS:   No chest pain. +faigue/weakness. No nausea. SHx:  No visitors this afternoon. Meds:  Scheduled Meds:   sodium chloride flush  5-40 mL IntraVENous 2 times per day    sodium chloride flush  5-40 mL IntraVENous 2 times per day    heparin (porcine)  5,000 Units SubCUTAneous TID    insulin lispro  0-6 Units SubCUTAneous Q4H    heparin (porcine)  1,000 Units IntraVENous Once    epoetin claire-epbx  10,000 Units IntraVENous Once per day on      Continuous Infusions:   sodium chloride      sodium chloride      sodium chloride 50 mL/hr at 22 1803    dextrose       PRN Meds:.metoprolol, HYDROmorphone, LORazepam, sodium chloride, ondansetron **OR** ondansetron, acetaminophen **OR** acetaminophen, sodium chloride flush, sodium chloride, polyethylene glycol, glucose, glucagon (rDNA), dextrose, dextrose bolus **OR** dextrose bolus, diphenhydrAMINE    Vitals:  /60   Pulse 82   Temp 98.5 °F (36.9 °C) (Axillary)   Resp 18   Wt 298 lb 11.2 oz (135.5 kg)   SpO2 100%   BMI 51.27 kg/m²     Physical Exam:  Gen: Resting in bed, NAD. HEENT: MMM, +trach. +NG  CV: RRR, no S3.  Lungs: good respiratory effort and clear air entry   Abd: S/NT +BS  Ext: Trace bilateral LE edema, no cyanosis  Skin: Warm. No rashes appreciated. Access: Left upper arm AV graft +bruit.     Labs:  CBC:   Lab Results   Component Value Date    WBC 12.7 2022    RBC 2.70 2022    HGB 7.9 2022    HCT 25.6 2022    MCV 94.9 2022    MCH 29.3 2022    MCHC 30.9 2022    RDW 18.2 2022     2022    MPV 7.2 2022     BMP:    Lab Results   Component Value Date     2022    K 4.7 2022    CL 94

## 2022-05-07 NOTE — FLOWSHEET NOTE
05/07/22 1307 05/07/22 1405   Vital Signs   BP (!) 172/74 (!) 179/75   Temp 96.2 °F (35.7 °C) 96.2 °F (35.7 °C)   Pulse 80 93   Resp 20 20     Treatment time: 3.5 hours ordered, see summary below. Net UF: 179 ml    Pre weight: 128.1 kg (bed scale)  Post weight: 128 kg (bed scale)  EDW: TBD    Access used: NELSY AVG  Access function: no problems    Medications or blood products given: None    Regular outpatient schedule: Do ARREOLA    Summary of response to treatment: Off AMA, actual tx time 50 minutes, RTD 2:40. AMA form signed and placed in chart. Dr. Duncan Class notified. Stated didn't want to do back to back HD tx, wasn't feeling well, couldn't breath. O2 sat 100% before RT in to sx trach. VSS. Copy of dialysis treatment record placed in chart, to be scanned into EMR.     Report called to Tonio Hoffmann RN

## 2022-05-07 NOTE — PROGRESS NOTES
[address]  Patient has size # 6 XLT . Trach Kit of same size on standby  Yes , Obturator at head of the bed  Yes. Inner cannula cleaned/replaced Yes, drain sponge changed  Yes, Trach tie replaced is soiled No, Flange cleaned  Yes. [address] performed without complications.  Comment: small dark green secretions around the trach site

## 2022-05-07 NOTE — PROGRESS NOTES
[facility]        Progress Note    Pt Name: Carlos Burks  MRN: [id number]  YOB: 1950  Date of evaluation: 2022    Chief Complaint: abdominal pain      Subjective:    No acute events overnight  Difficult to communicate with, able to speak but fatigues before providing relevant history  No nausea with NG  Stool with bowel regimen    Vital Signs:  Patient Vitals for the past 24 hrs:   BP Temp Temp src Pulse Resp SpO2 Weight   22 0721 (!) 154/68 98.6 °F (37 °C) Axillary 84 18 100 % --   22 0330 (!) 144/81 98.2 °F (36.8 °C) Oral 84 18 100 % --   22 0312 -- -- -- -- -- -- 298 lb 11.2 oz (135.5 kg)   22 0130 (!) 178/78 98.2 °F (36.8 °C) Oral 98 17 100 % --   22 2349 (!) 143/82 98.2 °F (36.8 °C) Oral 80 18 100 % --   22 2309 -- -- -- -- 18 -- --   22 2200 (!) 142/77 98 °F (36.7 °C) Oral 83 18 100 % --   22 1700 133/85 98 °F (36.7 °C) Oral 96 18 100 % --   22 1200 (!) 146/84 98.4 °F (36.9 °C) Axillary 85 20 100 % --      TEMPERATURE HISTORY 24H: Temp (24hrs), Av.2 °F (36.8 °C), Min:98 °F (36.7 °C), Max:98.6 °F (37 °C)    BLOOD PRESSURE HISTORY: Systolic (47JJT), EEH:285 , Min:133 , THL:910    Diastolic (95XFG), GNB:68, Min:66, Max:85      Intake/Output:    I/O last 3 completed shifts:  In: -   Out: 350 [Emesis/NG output:350]  No intake/output data recorded.   Drain/tube Output:           Physical Exam:  General: fatigued, somnolent, does not answer orienting questions, no acute distress  Cardiac: regular rate and rhythm   Pulmonary: clear to auscultation bilaterally   Abdomen: soft, mild distension, minimal mid abdominal tenderness    Labs:  CBC:    Recent Labs     22   WBC 12.7*   HGB 7.9*   HCT 25.6*   *     BMP:    Recent Labs     22      K 4.7   CL 94*   CO2 26   BUN 63*   CREATININE 6.3*   GLUCOSE 135*     Hepatic:   Recent Labs     22   AST 10*   ALT 7*   BILITOT 0.3   ALKPHOS 98     Amylase: No results for input(s): AMYLASE in the last 72 hours. Lipase: No results for input(s): LIPASE in the last 72 hours. Mag:    No results for input(s): MG in the last 72 hours. Phos:     Recent Labs     05/06/22  0513   PHOS 5.4*      Coags: No results for input(s): INR, APTT in the last 72 hours. Cultures:  Relevant cultures documented under results section     Pathology:   No relevant pathology     Imaging:  I have personally reviewed the following films:    CT ABDOMEN PELVIS WO CONTRAST Additional Contrast? None    Result Date: 5/6/2022  EXAMINATION: CT OF THE ABDOMEN AND PELVIS WITHOUT CONTRAST 5/6/2022 4:26 am TECHNIQUE: CT of the abdomen and pelvis was performed without the administration of intravenous contrast. Multiplanar reformatted images are provided for review. Dose modulation, iterative reconstruction, and/or weight based adjustment of the mA/kV was utilized to reduce the radiation dose to as low as reasonably achievable. COMPARISON: None. HISTORY: ORDERING SYSTEM PROVIDED HISTORY: abd pain TECHNOLOGIST PROVIDED HISTORY: Reason for exam:->abd pain Additional Contrast?->None Decision Support Exception - unselect if not a suspected or confirmed emergency medical condition->Emergency Medical Condition (MA) Reason for Exam: abd pain Relevant Medical/Surgical History: Other (Patient has missed dialysis txs, states is in pain and wants meds, pt has trach. ) FINDINGS: Lower Chest: Calcified granulomatous changes are identified. No focal consolidation or pleural effusion is identified. No pericardial effusion. No distal esophageal thickening. Organs: No hypodense mass identified in the liver or spleen. No adrenal mass. No pancreatic mass. No peripancreatic inflammatory process. The gallbladder is unremarkable. Numerous cysts are identified within the kidneys bilaterally.   Many of the cysts are simple fluid in density, however several are also not consistent with simple cysts. There is a 40 Hounsfield unit lesion in the lower pole the left kidney measuring 3.6 cm in size. GI/Bowel: Rectosigmoid distention is seen with a small to moderate volume of stool noted. No significant stool volume elsewhere within the colon. Minimal colonic diverticulosis is identified. Mildly dilated loops of small bowel seen in the left lower quadrant, with a focal transition noted posterior to a hernia mesh and appreciated on and around axial image [id number]. There is a small bowel feces sign noted just proximal to this transition point as well. Pelvis: No pelvic mass. Masslike appearance within the uterus. Calcified fibroids are noted. No free fluid. Peritoneum/Retroperitoneum: No aortic aneurysm is identified. No retroperitoneal or mesenteric bulky lymphadenopathy. There is a omental fat containing hernia seen just above a prior anterior hernia repair. Bones/Soft Tissues: No acute subcutaneous soft tissue abnormality is identified. Diffuse osteopenia. Degenerative changes are seen in the spine and sacroiliac joints. No osseous destructive process is identified. 1. There is a partial small bowel obstruction, with a discrete transition point noted adjacent to hernia mesh within the anterior lower abdomen. Just proximal to the transition point, a small bowel feces sign is seen related to delayed transit. This is seen on and around axial image 136-144. 2. Mild to moderate stool volume noted with rectosigmoid colonic distention. No functional obstruction related to this however. 3. Several renal cysts are seen bilaterally, many of which appearing normal in density. However there are some more hyperdense lesions, including a 3.6 cm left lower pole lesion measuring 40 Hounsfield units. These would be best characterized with dedicated MRI of the kidneys per renal mass protocol to exclude the possibility of a renal neoplasm.  4. Masslike appearance within the uterus suggestive of underlying uterine leiomyomas, as noted on recent CT imaging of the pelvis on 03/03/2022. XR ANKLE RIGHT (MIN 3 VIEWS)    Result Date: 4/26/2022  EXAMINATION: THREE XRAY VIEWS OF THE RIGHT ANKLE 4/26/2022 8:54 pm COMPARISON: None. HISTORY: ORDERING SYSTEM PROVIDED HISTORY: pain TECHNOLOGIST PROVIDED HISTORY: please include foot Reason for exam:->pain Reason for Exam: Foot and ankle pain. No known injury. Best images possible as patient would not follow commands. FINDINGS: Severe osteopenia limits evaluation. There are moderate to severe degenerative changes with narrowing of the joint space and osteophytosis. No gross fracture or dislocation. Extensive soft tissue vascular calcifications. No acute fracture identified     XR ANKLE RIGHT (MIN 3 VIEWS)    Result Date: 4/26/2022  EXAMINATION: THREE XRAY VIEWS OF THE RIGHT ANKLE 4/26/2022 1:07 pm COMPARISON: None. HISTORY: ORDERING SYSTEM PROVIDED HISTORY: swelling TECHNOLOGIST PROVIDED HISTORY: Reason for exam:->swelling Reason for Exam: ankle pain, swelling FINDINGS: AP lateral and lateral oblique views of the right ankle reveal diffuse osteopenia without acute displaced fracture however degenerative changes at the tibiotalar joint space and within the midfoot however no acute displaced fracture     No acute displaced fracture with grossly anatomic alignment however advanced degenerative changes of the tibiotalar joint space and osteopenia limiting evaluation for subtle cortical defect     XR FOOT RIGHT (2 VIEWS)    Result Date: 4/26/2022  EXAMINATION: 2 XRAY VIEWS OF THE RIGHT FOOT 4/26/2022 8:54 pm COMPARISON: None. HISTORY: ORDERING SYSTEM PROVIDED HISTORY: pain TECHNOLOGIST PROVIDED HISTORY: Reason for exam:->pain Reason for Exam: Foot and ankle pain. No known injury. Best images possible as patient would not follow commands. FINDINGS: There is no evidence of acute fracture. There is normal alignment of the tarsometatarsal joints.   No acute joint abnormality. No focal osseous lesion. No focal soft tissue abnormality. Diffuse osteopenia. No acute osseous abnormality. US PELVIS COMPLETE    Result Date: 4/27/2022  EXAMINATION: PELVIC ULTRASOUND 4/27/2022 TECHNIQUE: Transabdominal pelvic ultrasound was performed. COMPARISON: None HISTORY: ORDERING SYSTEM PROVIDED HISTORY: postmenopausal vaginal bleeding on anticoagulation TECHNOLOGIST PROVIDED HISTORY: Reason for exam:->postmenopausal vaginal bleeding on anticoagulation Reason for exam:->known fibroids FINDINGS: Measurements: Not able for visualization Ultrasound Findings: Nonvisualization     Nondiagnostic evaluation. XR CHEST PORTABLE    Result Date: 4/30/2022  EXAMINATION: ONE XRAY VIEW OF THE CHEST 4/30/2022 1:47 pm COMPARISON: 04/26/2022 HISTORY: ORDERING SYSTEM PROVIDED HISTORY: dislodged trach; mucous plugging TECHNOLOGIST PROVIDED HISTORY: Reason for exam:->dislodged trach; mucous plugging Reason for Exam: Dislodged trach; mucous plugging FINDINGS: Tracheostomy tube remains in place. The lungs are without acute focal process. There is no effusion or pneumothorax. The cardiomediastinal silhouette is stable. The osseous structures are stable. No acute process. Bibasilar hypoaeration     XR CHEST PORTABLE    Result Date: 4/26/2022  EXAMINATION: ONE XRAY VIEW OF THE CHEST 4/26/2022 3:15 pm COMPARISON: Chest x-ray dated 02/21/2022. HISTORY: ORDERING SYSTEM PROVIDED HISTORY: wbc TECHNOLOGIST PROVIDED HISTORY: Reason for exam:->wbc Reason for Exam: elevated white blood count FINDINGS: LINES/TUBES/OTHER: Tracheostomy tube is again noted. HEART/MEDIASTINUM: The cardiac silhouette is enlarged, but stable. PLEURA/LUNGS: There are no focal consolidations or pleural effusions. There is no appreciable pneumothorax. BONES/SOFT TISSUE: No acute abnormality. No radiographic evidence of acute pulmonary disease.      XR ABDOMEN FOR NG/OG/NE TUBE PLACEMENT    Result Date: 5/7/2022  EXAMINATION: ONE SUPINE XRAY VIEW(S) OF THE ABDOMEN 5/6/2022 10:06 pm COMPARISON: 05/06/2022 HISTORY: ORDERING SYSTEM PROVIDED HISTORY: Confirmation of course of NG/OG/NE tube and location of tip of tube TECHNOLOGIST PROVIDED HISTORY: Reason for exam:->Confirmation of course of NG/OG/NE tube and location of tip of tube Portable? ->Yes FINDINGS: Nasogastric tube is coursing into the stomach with the side port just below the level of the diaphragm. Could be advanced by 5 cm. Tracheostomy is also redemonstrated. The gas distended small bowel loops in the upper abdomen. Cardiac silhouette is mildly enlarged with some mild central vascular congestion. NG tube courses into the stomach but can be advanced by 5 cm for better positioning. Gas distended small bowel loops in the upper abdomen. XR ABDOMEN FOR NG/OG/NE TUBE PLACEMENT    Result Date: 5/6/2022  EXAMINATION: ONE SUPINE XRAY VIEW(S) OF THE ABDOMEN 5/6/2022 6:55 am COMPARISON: None. HISTORY: ORDERING SYSTEM PROVIDED HISTORY: NG Tube Placement TECHNOLOGIST PROVIDED HISTORY: Reason for exam:->NG Tube Placement Portable? ->Yes Reason for Exam: NG Tube Placement FINDINGS: Exam is limited by patient body habitus and patient positioning Tip of ET tube projects at the level of the midthoracic trachea NG tube is seen. The tip appears to terminate in the region of the stomach Heart is enlarged.      Tip of NG tube projects in region of stomach       Scheduled Meds:   sodium chloride flush  5-40 mL IntraVENous 2 times per day    sodium chloride flush  5-40 mL IntraVENous 2 times per day    heparin (porcine)  5,000 Units SubCUTAneous TID    insulin lispro  0-6 Units SubCUTAneous Q4H    heparin (porcine)  1,000 Units IntraVENous Once    epoetin claire-epbx  10,000 Units IntraVENous Once per day on Mon Wed Fri     Continuous Infusions:   sodium chloride      sodium chloride      sodium chloride 50 mL/hr at 05/06/22 1803    dextrose       PRN Meds:.metoprolol, HYDROmorphone, sodium chloride, ondansetron **OR** ondansetron, acetaminophen **OR** acetaminophen, sodium chloride flush, sodium chloride, polyethylene glycol, glucose, glucagon (rDNA), dextrose, dextrose bolus **OR** dextrose bolus, diphenhydrAMINE      Assessment:  Patient Active Problem List   Diagnosis    Unresponsive episode    Syncope    Acute respiratory failure with hypoxia and hypercapnia (HCC)    Pneumonia due to infectious organism    [facility]D [facility]    Chronic respiratory failure requiring continuous mechanical ventilation through tracheostomy ([facility]    Acute on chronic respiratory failure with hypoxia (HCC)    Acute pulmonary edema (HCC)    Rectal pain    History of CVA (cerebrovascular accident)    HCAP (healthcare-associated pneumonia)    Anemia, chronic disease    Anxiety    Chronic diastolic heart failure (HCC)    Dysphagia, pharyngeal phase    Essential hypertension    GERD (gastroesophageal reflux disease)    Morbid obesity with BMI of 50.0-59.9, adult (HCC)    SILVER (obstructive sleep apnea)    Abnormal chest x-ray    Vaginal bleeding    SBO (small bowel obstruction) (HCC)     Partial small bowel obstruction  Fecal impaction    Plan:  1. NG decompression, bowel rest  2. Monitor bowel function  3. If does not improve in 1-2 days, may need repeat imaging with PO contrast with may be both diagnostic and therapeutic  4. IVF, monitor and replace electrolytes as needed  5. Pain control, minimize narcotics  6. Defer management of remainder of medical comorbidities to primary and consulting teams    Juan Galeas MD, FACS  5/7/2022  11:32 AM

## 2022-05-07 NOTE — PROGRESS NOTES
[facility] PROGRESS NOTE    5/7/2022 11:14 AM        Name: Sly Bentíez . Admitted: 5/6/2022  Primary Care Provider: Sonido Thomas MD (Tel: [phone])      Chief Complaint   Patient presents with    Other     Patient has missed dialysis txs, states is in pain and wants meds, pt has trach. Hospital Course: Patient is a 81 yo female with hx ESRD on HD, chronic respiratory failure s/p trach, CVA, DVT on warfarin, morbid obesity, anemia, DM2, dCHF, afib on Coumadin. Patient resides at [facility] and was sent to ER with c/o abdominal pain, n/v. She was found to have SBO. NG has been placed. Admitted for further management and surgical consult. Subjective:  Presently resting in bed, she has not walked in [facility]. \" Has mask to trach collar. Reports improvement in abdominal pain. NG presently clamped. Denies nausea, says it comes and goes. Denies shortness of breath, chest pain. Abdomen distended.       Reviewed interval ancillary notes    Current Medications  metoprolol (LOPRESSOR) injection 5 mg, Q6H PRN  sodium chloride flush 0.9 % injection 5-40 mL, 2 times per day  0.9 % sodium chloride infusion, PRN  ondansetron (ZOFRAN-ODT) disintegrating tablet 4 mg, Q8H PRN   Or  ondansetron (ZOFRAN) injection 4 mg, Q6H PRN  acetaminophen (TYLENOL) tablet 650 mg, Q6H PRN   Or  acetaminophen (TYLENOL) suppository 650 mg, Q6H PRN  sodium chloride flush 0.9 % injection 5-40 mL, 2 times per day  sodium chloride flush 0.9 % injection 5-40 mL, PRN  0.9 % sodium chloride infusion, PRN  heparin (porcine) injection 5,000 Units, TID  polyethylene glycol (GLYCOLAX) packet 17 g, Daily PRN  0.9 % sodium chloride infusion, Continuous  glucose (GLUTOSE) 40 % oral gel 15 g, PRN  glucagon (rDNA) injection 1 mg, PRN  dextrose 5 % solution, PRN  insulin lispro (HUMALOG) injection vial 0-6 Units, Q4H  dextrose bolus 10% 125 mL, PRN   Or  dextrose bolus 10% 250 mL, PRN  HYDROmorphone HCl PF (DILAUDID) injection 0.25 mg, Q4H PRN  heparin (porcine) injection 1,000 Units, Once  epoetin claire-epbx (RETACRIT) injection 10,000 Units, Once per day on Mon Wed Fri  diphenhydrAMINE (BENADRYL) injection 25 mg, Q6H PRN        Objective:  BP (!) 154/68   Pulse 84   Temp 98.6 °F (37 °C) (Axillary)   Resp 18   Wt 298 lb 11.2 oz (135.5 kg)   SpO2 100%   BMI 51.27 kg/m²     Intake/Output Summary (Last 24 hours) at 5/7/2022 1114  Last data filed at 5/7/2022 0330  Gross per 24 hour   Intake --   Output 350 ml   Net -350 ml      Wt Readings from Last 3 Encounters:   05/07/22 298 lb 11.2 oz (135.5 kg)   05/01/22 (!) 309 lb 8 oz (140.4 kg)   04/26/22 291 lb (132 kg)       General appearance:  Appears comfortable  Eyes: Sclera clear. Pupils equal.  ENT: Tracheostomy  Cardiovascular: Regular rhythm, normal S1, S2. No murmur. Trace edema in lower extremities  Respiratory: Not using accessory muscles. Good inspiratory effort. Clear to auscultation bilaterally, no wheeze or crackles. GI: Abdomen soft, no tenderness, + distention, + bowel sounds  Neurology: Grossly intact. Psych: Normal affect. Alert and oriented in time, place and person  Skin: Warm, dry, normal turgor    Labs and Tests:  CBC:   Recent Labs     05/06/22 0513   WBC 12.7*   HGB 7.9*   *     BMP:    Recent Labs     05/06/22 0513      K 4.7   CL 94*   CO2 26   BUN 63*   CREATININE 6.3*   GLUCOSE 135*     Hepatic:   Recent Labs     05/06/22 0513   AST 10*   ALT 7*   BILITOT 0.3   ALKPHOS 98       CT Abdomen/Pelvis 5/6/2022:  1.  There is a partial small bowel obstruction, with a discrete transition   point noted adjacent to hernia mesh within the anterior lower abdomen.  Just   proximal to the transition point, a small bowel feces sign is seen related to   delayed transit.  This is seen on and around axial image [id number].   2. Mild to moderate stool volume noted with rectosigmoid colonic distention. No functional obstruction related to this however. 3. Several renal cysts are seen bilaterally, many of which appearing normal   in density.  However there are some more hyperdense lesions, including a 3.6   cm left lower pole lesion measuring 40 Hounsfield units.  These would be best   characterized with dedicated MRI of the kidneys per renal mass protocol to   exclude the possibility of a renal neoplasm. 4. Masslike appearance within the uterus suggestive of underlying uterine   leiomyomas, as noted on recent CT imaging of the pelvis on 03/03/2022. Xray Abdomen 5/6/2022:  NG tube courses into the stomach but can be advanced by 5 cm for better   positioning.       Gas distended small bowel loops in the upper abdomen. Problem List  Principal Problem:    SBO (small bowel obstruction) ([facility])  Active Problems:    ESRD on dialysis ([facility] [address]    Chronic respiratory failure requiring continuous mechanical ventilation through tracheostomy ([facility] [address] [facility]    History of CVA (cerebrovascular accident)    Anemia, chronic disease    Chronic diastolic heart failure ([facility] [address]    Essential hypertension    GERD (gastroesophageal reflux disease)    Morbid obesity with BMI of 50.0-59.9, adult ([facility])    SILVER (obstructive sleep apnea)  Resolved Problems:    * No resolved hospital problems. *       Assessment & Plan:   1. SBO. Presents with abdominal pain, n/v. CT scan revealed partial small bowel obstruction. NGT placed. Evaluated by surgery, combination of mechanical and functional issues. Received enema with BM. Abdomen remains distended. Surgery plans to continue NG decompression and bowel rest. Continue IV fluids. 2. ESRD on HD. Management per nephrology. Dialysis days T-Th-Sat  3. Chronic respiratory failure / tracheostomy. O2 sats stable, 100% on 6 liters per trach mask. 4. Normocytic anemia. Hgb 7.9 on presentation compared to 8.0 on DC 5/1. Continue to follow. 5. Vaginal bleed.  Nursing reports some vaginal bleeding. This was issue during prior admission. CT abd/pelvis reveals masslike appearance within the uterus. Patient refused GYN exam then and continues to refuse GYN exam or further testing this admission. Aware of Ct scan results showing mass. Monitor CBC. 6. History DVT. Warfarin held on admission.  Check INR in am. .       Diet: Diet NPO  Code:Full Code  DVT PPX: heparin      [facility] - CNP   5/7/2022 11:14 AM

## 2022-05-07 NOTE — PROGRESS NOTES
Pt c/o feeling panicky. Requesting respiratory therapy. RR 18, Stating at 100% o2 via trach. Respiratory therapist notified by this for further evaluation. BP[ 178/78. Hospitialist notified to review orders.  Electronically signed by Elton Mayfield RN on 5/7/2022 at 2:49 AM

## 2022-05-07 NOTE — PROGRESS NOTES
Hospitalist informed pf pts active vaginal bleeding. Moderate dark bleeding noted with minimal blood clots. Evening heparin held. Arevalo in place and draining.  Electronically signed by Rola Amaya RN on 5/7/2022 at 1:06 AM

## 2022-05-08 LAB
GLUCOSE BLD-MCNC: 86 MG/DL (ref 70–99)
GLUCOSE BLD-MCNC: 87 MG/DL (ref 70–99)
GLUCOSE BLD-MCNC: 88 MG/DL (ref 70–99)
GLUCOSE BLD-MCNC: 89 MG/DL (ref 70–99)
GLUCOSE BLD-MCNC: 94 MG/DL (ref 70–99)
GLUCOSE BLD-MCNC: 96 MG/DL (ref 70–99)
PERFORMED ON: NORMAL

## 2022-05-08 PROCEDURE — 94760 N-INVAS EAR/PLS OXIMETRY 1: CPT

## 2022-05-08 PROCEDURE — 6360000002 HC RX W HCPCS: Performed by: NURSE PRACTITIONER

## 2022-05-08 PROCEDURE — 2700000000 HC OXYGEN THERAPY PER DAY

## 2022-05-08 PROCEDURE — 2580000003 HC RX 258: Performed by: INTERNAL MEDICINE

## 2022-05-08 PROCEDURE — 1200000000 HC SEMI PRIVATE

## 2022-05-08 PROCEDURE — 2580000003 HC RX 258: Performed by: HOSPITALIST

## 2022-05-08 PROCEDURE — 6370000000 HC RX 637 (ALT 250 FOR IP): Performed by: NURSE PRACTITIONER

## 2022-05-08 PROCEDURE — 2500000003 HC RX 250 WO HCPCS: Performed by: NURSE PRACTITIONER

## 2022-05-08 PROCEDURE — C9113 INJ PANTOPRAZOLE SODIUM, VIA: HCPCS | Performed by: NURSE PRACTITIONER

## 2022-05-08 PROCEDURE — 6360000002 HC RX W HCPCS: Performed by: INTERNAL MEDICINE

## 2022-05-08 PROCEDURE — 2060000000 HC ICU INTERMEDIATE R&B

## 2022-05-08 PROCEDURE — 94761 N-INVAS EAR/PLS OXIMETRY MLT: CPT

## 2022-05-08 PROCEDURE — 99232 SBSQ HOSP IP/OBS MODERATE 35: CPT | Performed by: SURGERY

## 2022-05-08 RX ORDER — QUETIAPINE FUMARATE 25 MG/1
12.5 TABLET, FILM COATED ORAL NIGHTLY
Status: DISCONTINUED | OUTPATIENT
Start: 2022-05-08 | End: 2022-05-21 | Stop reason: HOSPADM

## 2022-05-08 RX ORDER — LORAZEPAM 2 MG/ML
1 INJECTION INTRAMUSCULAR ONCE
Status: COMPLETED | OUTPATIENT
Start: 2022-05-08 | End: 2022-05-08

## 2022-05-08 RX ORDER — PANTOPRAZOLE SODIUM 40 MG/10ML
20 INJECTION, POWDER, LYOPHILIZED, FOR SOLUTION INTRAVENOUS DAILY
Status: DISCONTINUED | OUTPATIENT
Start: 2022-05-08 | End: 2022-05-21 | Stop reason: HOSPADM

## 2022-05-08 RX ADMIN — Medication 10 ML: at 20:43

## 2022-05-08 RX ADMIN — HEPARIN SODIUM 5000 UNITS: 5000 INJECTION INTRAVENOUS; SUBCUTANEOUS at 14:11

## 2022-05-08 RX ADMIN — HYDROMORPHONE HYDROCHLORIDE 0.5 MG: 1 INJECTION, SOLUTION INTRAMUSCULAR; INTRAVENOUS; SUBCUTANEOUS at 13:31

## 2022-05-08 RX ADMIN — LORAZEPAM 0.5 MG: 2 INJECTION INTRAMUSCULAR; INTRAVENOUS at 20:42

## 2022-05-08 RX ADMIN — Medication 10 ML: at 20:44

## 2022-05-08 RX ADMIN — SODIUM CHLORIDE, PRESERVATIVE FREE 10 ML: 5 INJECTION INTRAVENOUS at 21:34

## 2022-05-08 RX ADMIN — LORAZEPAM 0.5 MG: 2 INJECTION INTRAMUSCULAR; INTRAVENOUS at 06:03

## 2022-05-08 RX ADMIN — HYDROMORPHONE HYDROCHLORIDE 0.5 MG: 1 INJECTION, SOLUTION INTRAMUSCULAR; INTRAVENOUS; SUBCUTANEOUS at 20:43

## 2022-05-08 RX ADMIN — HEPARIN SODIUM 5000 UNITS: 5000 INJECTION INTRAVENOUS; SUBCUTANEOUS at 20:43

## 2022-05-08 RX ADMIN — METOPROLOL TARTRATE 5 MG: 5 INJECTION INTRAVENOUS at 16:02

## 2022-05-08 RX ADMIN — DIPHENHYDRAMINE HYDROCHLORIDE 25 MG: 50 INJECTION, SOLUTION INTRAMUSCULAR; INTRAVENOUS at 06:02

## 2022-05-08 RX ADMIN — HYDROMORPHONE HYDROCHLORIDE 0.5 MG: 1 INJECTION, SOLUTION INTRAMUSCULAR; INTRAVENOUS; SUBCUTANEOUS at 00:53

## 2022-05-08 RX ADMIN — PANTOPRAZOLE SODIUM 20 MG: 40 INJECTION, POWDER, FOR SOLUTION INTRAVENOUS at 21:33

## 2022-05-08 RX ADMIN — Medication 10 ML: at 08:50

## 2022-05-08 RX ADMIN — QUETIAPINE FUMARATE 12.5 MG: 25 TABLET ORAL at 20:42

## 2022-05-08 RX ADMIN — LORAZEPAM 1 MG: 2 INJECTION INTRAMUSCULAR; INTRAVENOUS at 01:33

## 2022-05-08 RX ADMIN — HYDROMORPHONE HYDROCHLORIDE 0.5 MG: 1 INJECTION, SOLUTION INTRAMUSCULAR; INTRAVENOUS; SUBCUTANEOUS at 08:50

## 2022-05-08 ASSESSMENT — PAIN DESCRIPTION - ORIENTATION
ORIENTATION: OTHER (COMMENT)
ORIENTATION: OTHER (COMMENT)

## 2022-05-08 ASSESSMENT — PAIN DESCRIPTION - LOCATION
LOCATION: ABDOMEN

## 2022-05-08 ASSESSMENT — PAIN DESCRIPTION - DESCRIPTORS
DESCRIPTORS: ACHING
DESCRIPTORS: ACHING;DULL
DESCRIPTORS: ACHING;GNAWING

## 2022-05-08 ASSESSMENT — PAIN SCALES - GENERAL
PAINLEVEL_OUTOF10: 8
PAINLEVEL_OUTOF10: 7
PAINLEVEL_OUTOF10: 10
PAINLEVEL_OUTOF10: 7

## 2022-05-08 ASSESSMENT — PAIN DESCRIPTION - FREQUENCY
FREQUENCY: INTERMITTENT
FREQUENCY: INTERMITTENT

## 2022-05-08 ASSESSMENT — PAIN DESCRIPTION - PAIN TYPE
TYPE: ACUTE PAIN
TYPE: ACUTE PAIN

## 2022-05-08 ASSESSMENT — PAIN DESCRIPTION - ONSET
ONSET: ON-GOING
ONSET: ON-GOING

## 2022-05-08 ASSESSMENT — PAIN - FUNCTIONAL ASSESSMENT
PAIN_FUNCTIONAL_ASSESSMENT: ACTIVITIES ARE NOT PREVENTED
PAIN_FUNCTIONAL_ASSESSMENT: ACTIVITIES ARE NOT PREVENTED

## 2022-05-08 NOTE — PLAN OF CARE
Problem: Discharge Planning  Goal: Discharge to home or other facility with appropriate resources  Outcome: Progressing  Flowsheets (Taken 5/8/2022 1631)  Discharge to home or other facility with appropriate resources: Identify barriers to discharge with patient and caregiver     Problem: Pain  Goal: Verbalizes/displays adequate comfort level or baseline comfort level  Outcome: Progressing     Problem: Skin/Tissue Integrity  Goal: Absence of new skin breakdown  Description: 1. Monitor for areas of redness and/or skin breakdown  2. Assess vascular access sites hourly  3. Every 4-6 hours minimum:  Change oxygen saturation probe site  4. Every 4-6 hours:  If on nasal continuous positive airway pressure, respiratory therapy assess nares and determine need for appliance change or resting period.   Outcome: Progressing     Problem: Safety - Adult  Goal: Free from fall injury  Outcome: Progressing     Problem: ABCDS Injury Assessment  Goal: Absence of physical injury  Outcome: Progressing

## 2022-05-08 NOTE — PROGRESS NOTES
* 433     BMP:    Recent Labs     05/06/22 0513 05/07/22  1300    138   K 4.7 4.2   CL 94* 94*   CO2 26 28   BUN 63* 41*   CREATININE 6.3* 4.5*   GLUCOSE 135* 114*     Hepatic:   Recent Labs     05/06/22 0513   AST 10*   ALT 7*   BILITOT 0.3   ALKPHOS 98     Amylase: No results for input(s): AMYLASE in the last 72 hours. Lipase: No results for input(s): LIPASE in the last 72 hours. Mag:    No results for input(s): MG in the last 72 hours. Phos:     Recent Labs     05/06/22 0513   PHOS 5.4*      Coags: No results for input(s): INR, APTT in the last 72 hours. Cultures:  Relevant cultures documented under results section     Pathology:   No relevant pathology     Imaging:  I have personally reviewed the following films:    CT ABDOMEN PELVIS WO CONTRAST Additional Contrast? None    Result Date: 5/6/2022  EXAMINATION: CT OF THE ABDOMEN AND PELVIS WITHOUT CONTRAST 5/6/2022 4:26 am TECHNIQUE: CT of the abdomen and pelvis was performed without the administration of intravenous contrast. Multiplanar reformatted images are provided for review. Dose modulation, iterative reconstruction, and/or weight based adjustment of the mA/kV was utilized to reduce the radiation dose to as low as reasonably achievable. COMPARISON: None. HISTORY: ORDERING SYSTEM PROVIDED HISTORY: abd pain TECHNOLOGIST PROVIDED HISTORY: Reason for exam:->abd pain Additional Contrast?->None Decision Support Exception - unselect if not a suspected or confirmed emergency medical condition->Emergency Medical Condition (MA) Reason for Exam: abd pain Relevant Medical/Surgical History: Other (Patient has missed dialysis txs, states is in pain and wants meds, pt has trach. ) FINDINGS: Lower Chest: Calcified granulomatous changes are identified. No focal consolidation or pleural effusion is identified. No pericardial effusion. No distal esophageal thickening. Organs: No hypodense mass identified in the liver or spleen. No adrenal mass.   No pancreatic mass. No peripancreatic inflammatory process. The gallbladder is unremarkable. Numerous cysts are identified within the kidneys bilaterally. Many of the cysts are simple fluid in density, however several are also not consistent with simple cysts. There is a 40 Hounsfield unit lesion in the lower pole the left kidney measuring 3.6 cm in size. GI/Bowel: Rectosigmoid distention is seen with a small to moderate volume of stool noted. No significant stool volume elsewhere within the colon. Minimal colonic diverticulosis is identified. Mildly dilated loops of small bowel seen in the left lower quadrant, with a focal transition noted posterior to a hernia mesh and appreciated on and around axial image [id number]. There is a small bowel feces sign noted just proximal to this transition point as well. Pelvis: No pelvic mass. Masslike appearance within the uterus. Calcified fibroids are noted. No free fluid. Peritoneum/Retroperitoneum: No aortic aneurysm is identified. No retroperitoneal or mesenteric bulky lymphadenopathy. There is a omental fat containing hernia seen just above a prior anterior hernia repair. Bones/Soft Tissues: No acute subcutaneous soft tissue abnormality is identified. Diffuse osteopenia. Degenerative changes are seen in the spine and sacroiliac joints. No osseous destructive process is identified. 1. There is a partial small bowel obstruction, with a discrete transition point noted adjacent to hernia mesh within the anterior lower abdomen. Just proximal to the transition point, a small bowel feces sign is seen related to delayed transit. This is seen on and around axial image [id number]. 2. Mild to moderate stool volume noted with rectosigmoid colonic distention. No functional obstruction related to this however. 3. Several renal cysts are seen bilaterally, many of which appearing normal in density.   However there are some more hyperdense lesions, including a 3.6 cm left lower pole lesion measuring 40 Hounsfield units. These would be best characterized with dedicated MRI of the kidneys per renal mass protocol to exclude the possibility of a renal neoplasm. 4. Masslike appearance within the uterus suggestive of underlying uterine leiomyomas, as noted on recent CT imaging of the pelvis on 03/03/2022. XR ANKLE RIGHT (MIN 3 VIEWS)    Result Date: 4/26/2022  EXAMINATION: THREE XRAY VIEWS OF THE RIGHT ANKLE 4/26/2022 8:54 pm COMPARISON: None. HISTORY: ORDERING SYSTEM PROVIDED HISTORY: pain TECHNOLOGIST PROVIDED HISTORY: please include foot Reason for exam:->pain Reason for Exam: Foot and ankle pain. No known injury. Best images possible as patient would not follow commands. FINDINGS: Severe osteopenia limits evaluation. There are moderate to severe degenerative changes with narrowing of the joint space and osteophytosis. No gross fracture or dislocation. Extensive soft tissue vascular calcifications. No acute fracture identified     XR ANKLE RIGHT (MIN 3 VIEWS)    Result Date: 4/26/2022  EXAMINATION: THREE XRAY VIEWS OF THE RIGHT ANKLE 4/26/2022 1:07 pm COMPARISON: None. HISTORY: ORDERING SYSTEM PROVIDED HISTORY: swelling TECHNOLOGIST PROVIDED HISTORY: Reason for exam:->swelling Reason for Exam: ankle pain, swelling FINDINGS: AP lateral and lateral oblique views of the right ankle reveal diffuse osteopenia without acute displaced fracture however degenerative changes at the tibiotalar joint space and within the midfoot however no acute displaced fracture     No acute displaced fracture with grossly anatomic alignment however advanced degenerative changes of the tibiotalar joint space and osteopenia limiting evaluation for subtle cortical defect     XR FOOT RIGHT (2 VIEWS)    Result Date: 4/26/2022  EXAMINATION: 2 XRAY VIEWS OF THE RIGHT FOOT 4/26/2022 8:54 pm COMPARISON: None.  HISTORY: ORDERING SYSTEM PROVIDED HISTORY: pain TECHNOLOGIST PROVIDED HISTORY: Reason for exam:->pain pleural effusions. There is no appreciable pneumothorax. BONES/SOFT TISSUE: No acute abnormality. No radiographic evidence of acute pulmonary disease. XR ABDOMEN FOR NG/OG/NE TUBE PLACEMENT    Result Date: 5/7/2022  EXAMINATION: ONE SUPINE XRAY VIEW(S) OF THE ABDOMEN 5/6/2022 10:06 pm COMPARISON: 05/06/2022 HISTORY: ORDERING SYSTEM PROVIDED HISTORY: Confirmation of course of NG/OG/NE tube and location of tip of tube TECHNOLOGIST PROVIDED HISTORY: Reason for exam:->Confirmation of course of NG/OG/NE tube and location of tip of tube Portable? ->Yes FINDINGS: Nasogastric tube is coursing into the stomach with the side port just below the level of the diaphragm. Could be advanced by 5 cm. Tracheostomy is also redemonstrated. The gas distended small bowel loops in the upper abdomen. Cardiac silhouette is mildly enlarged with some mild central vascular congestion. NG tube courses into the stomach but can be advanced by 5 cm for better positioning. Gas distended small bowel loops in the upper abdomen. XR ABDOMEN FOR NG/OG/NE TUBE PLACEMENT    Result Date: 5/6/2022  EXAMINATION: ONE SUPINE XRAY VIEW(S) OF THE ABDOMEN 5/6/2022 6:55 am COMPARISON: None. HISTORY: ORDERING SYSTEM PROVIDED HISTORY: NG Tube Placement TECHNOLOGIST PROVIDED HISTORY: Reason for exam:->NG Tube Placement Portable? ->Yes Reason for Exam: NG Tube Placement FINDINGS: Exam is limited by patient body habitus and patient positioning Tip of ET tube projects at the level of the midthoracic trachea NG tube is seen. The tip appears to terminate in the region of the stomach Heart is enlarged.      Tip of NG tube projects in region of stomach       Scheduled Meds:   sodium chloride flush  5-40 mL IntraVENous 2 times per day    sodium chloride flush  5-40 mL IntraVENous 2 times per day    heparin (porcine)  5,000 Units SubCUTAneous TID    insulin lispro  0-6 Units SubCUTAneous Q4H    heparin (porcine)  1,000 Units IntraVENous Once    epoetin claire-epbx  10,000 Units IntraVENous Once per day on Mon Wed Fri     Continuous Infusions:   sodium chloride      sodium chloride      sodium chloride 50 mL/hr at 05/06/22 1803    dextrose       PRN Meds:.metoprolol, HYDROmorphone, LORazepam, sodium chloride, ondansetron **OR** ondansetron, acetaminophen **OR** acetaminophen, sodium chloride flush, sodium chloride, polyethylene glycol, glucose, glucagon (rDNA), dextrose, dextrose bolus **OR** dextrose bolus, diphenhydrAMINE      Assessment:  Patient Active Problem List   Diagnosis    Unresponsive episode    Syncope    Acute respiratory failure with hypoxia and hypercapnia (HCC)    Pneumonia due to infectious organism    [facility]    Chronic respiratory failure requiring continuous mechanical ventilation through tracheostomy ([facility]    Acute on chronic respiratory failure with hypoxia (HCC)    Acute pulmonary edema (HCC)    Rectal pain    History of CVA (cerebrovascular accident)    HCAP (healthcare-associated pneumonia)    Anemia, chronic disease    Anxiety    Chronic diastolic heart failure (HCC)    Dysphagia, pharyngeal phase    Essential hypertension    GERD (gastroesophageal reflux disease)    Morbid obesity with BMI of 50.0-59.9, adult (HCC)    SILVER (obstructive sleep apnea)    Abnormal chest x-ray    Vaginal bleeding    SBO (small bowel obstruction) (HCC)     Partial small bowel obstruction  Fecal impaction    Plan:  1. NG decompression, bowel rest, may clamp for critical medications such as seroquel  2. Monitor bowel function, no recent stool  3. Without significant improvement, ordered small bowel follow through tomorrow  4. IVF, monitor and replace electrolytes as needed  5. Pain control, minimize narcotics  6. Defer management of remainder of medical comorbidities to primary and consulting teams    Juan Stephenson MD, FACS  5/8/2022  12:36 PM

## 2022-05-08 NOTE — PROGRESS NOTES
Nephrology Progress Note  [facility]  [phone]   [email or web address]    Patient:  Jose Watson   : 1950    CC:  ESRD    Subjective:  Mrs. Maurizio Nelson came off HD after 50 minutes yesterday saying it was \"too much\" to do dialysis on back to back days. Overnight she requested repositioning very frequently and called 911 multiple times to request an aide be at her bedside continuously. Today she insists she will not do dialysis again until Tuesday. ROS:   No chest pain. +faigue/weakness. No nausea. SHx:  Her daughter is visiting her this afternoon. Meds:  Scheduled Meds:   QUEtiapine  12.5 mg Oral Nightly    sodium chloride flush  5-40 mL IntraVENous 2 times per day    sodium chloride flush  5-40 mL IntraVENous 2 times per day    heparin (porcine)  5,000 Units SubCUTAneous TID    insulin lispro  0-6 Units SubCUTAneous Q4H    heparin (porcine)  1,000 Units IntraVENous Once    epoetin claire-epbx  10,000 Units IntraVENous Once per day on      Continuous Infusions:   sodium chloride      sodium chloride      sodium chloride 50 mL/hr at 22 1803    dextrose       PRN Meds:.metoprolol, HYDROmorphone, LORazepam, sodium chloride, ondansetron **OR** ondansetron, acetaminophen **OR** acetaminophen, sodium chloride flush, sodium chloride, polyethylene glycol, glucose, glucagon (rDNA), dextrose, dextrose bolus **OR** dextrose bolus, diphenhydrAMINE    Vitals:  BP (!) 165/81   Pulse 83   Temp 98.6 °F (37 °C) (Axillary)   Resp 18   Wt 283 lb 8.2 oz (128.6 kg)   SpO2 100%   BMI 48.66 kg/m²     Physical Exam:  Gen: Resting in bed, NAD. HEENT: MMM, +trach. +NG  CV: RRR, no S3.  Lungs: good respiratory effort and clear air entry   Abd: S/NT +BS  Ext: Trace bilateral LE edema, no cyanosis  Skin: Warm. No rashes appreciated. Access: Left upper arm AV graft +bruit.     Labs:  CBC:   Lab Results   Component Value Date    WBC 10.3 2022    RBC 2.60 05/07/2022    HGB 8.0 05/07/2022    HCT 25.1 05/07/2022    MCV 96.6 05/07/2022    MCH 30.7 05/07/2022    MCHC 31.8 05/07/2022    RDW 18.8 05/07/2022     05/07/2022    MPV 7.3 05/07/2022     BMP:    Lab Results   Component Value Date     05/07/2022    K 4.2 05/07/2022    K 4.7 05/06/2022    CL 94 05/07/2022    CO2 28 05/07/2022    BUN 41 05/07/2022    LABALBU 3.2 05/06/2022    CREATININE 4.5 05/07/2022    CALCIUM 9.4 05/07/2022    GFRAA 12 05/07/2022    LABGLOM 10 05/07/2022    GLUCOSE 114 05/07/2022       Assessment/Plan:    ESRD: HD TTS at [facility]  -last outpatient HD on 5/3  -missed 5/5  -Got HD 5/6 x 2 hours  - HD back to Tues/Thurs/Sat schedule. She cut her treatment off after 50 minutes Saturday and declines dialysis today or Monday.     Partial SOB: s/p NGT-surgery following.     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

## 2022-05-08 NOTE — PROGRESS NOTES
[facility] PROGRESS NOTE    5/8/2022 12:33 PM        Name: Jonathan Louis . Admitted: 5/6/2022  Primary Care Provider: Karen Mandel MD (Tel: [phone])      Chief Complaint   Patient presents with    Other     Patient has missed dialysis txs, states is in pain and wants meds, pt has trach. Hospital Course: Patient is a 79 yo female with hx ESRD on HD, chronic respiratory failure s/p trach, CVA, DVT on warfarin, morbid obesity, anemia, DM2, dCHF, afib on Coumadin. Patient resides at [facility] and was sent to ER with c/o abdominal pain, n/v. She was found to have SBO. NG has been placed. Admitted for further management and surgical consult. Subjective:  Resting in bed. Patient restless overnight, frequently calling out to be repositioned, she phoned 911 requesting assistance. Spoke with nursing, patient less agitated today but still calling out frequently even when nurse in room. When I entered room, patient made multiple requests, she wanted to be pulled up in bed, wanted phone charged, daughter called (busy signal), wanted popsicle. All requests completed. Patient says she is hungry, discussed with surgery and patient now permitted to have clear liquids. Patient tells me she has had BM but none per nursing. Continues to have vaginal bleeding, discussed with patient, agrees to GYN consult. NGT with bile drainage, surgery ordered small bowel follow through for tomorrow.  Labs unable to be obtained today, IV access tenuous at best.     Reviewed interval ancillary notes    Current Medications  metoprolol (LOPRESSOR) injection 5 mg, Q6H PRN  HYDROmorphone HCl PF (DILAUDID) injection 0.5 mg, Q3H PRN  LORazepam (ATIVAN) injection 0.5 mg, Q12H PRN  sodium chloride flush 0.9 % injection 5-40 mL, 2 times per day  0.9 % sodium chloride infusion, PRN  ondansetron (ZOFRAN-ODT) disintegrating tablet 4 mg, Q8H PRN   Or  ondansetron (ZOFRAN) injection 4 mg, Q6H PRN  acetaminophen (TYLENOL) tablet 650 mg, Q6H PRN   Or  acetaminophen (TYLENOL) suppository 650 mg, Q6H PRN  sodium chloride flush 0.9 % injection 5-40 mL, 2 times per day  sodium chloride flush 0.9 % injection 5-40 mL, PRN  0.9 % sodium chloride infusion, PRN  heparin (porcine) injection 5,000 Units, TID  polyethylene glycol (GLYCOLAX) packet 17 g, Daily PRN  0.9 % sodium chloride infusion, Continuous  glucose (GLUTOSE) 40 % oral gel 15 g, PRN  glucagon (rDNA) injection 1 mg, PRN  dextrose 5 % solution, PRN  insulin lispro (HUMALOG) injection vial 0-6 Units, Q4H  dextrose bolus 10% 125 mL, PRN   Or  dextrose bolus 10% 250 mL, PRN  heparin (porcine) injection 1,000 Units, Once  epoetin claire-epbx (RETACRIT) injection 10,000 Units, Once per day on Mon Wed Fri  diphenhydrAMINE (BENADRYL) injection 25 mg, Q6H PRN        Objective:  BP (!) 165/81   Pulse 83   Temp 98.6 °F (37 °C) (Axillary)   Resp 18   Wt 283 lb 8.2 oz (128.6 kg)   SpO2 100%   BMI 48.66 kg/m²     Intake/Output Summary (Last 24 hours) at 5/8/2022 1233  Last data filed at 5/8/2022 3306  Gross per 24 hour   Intake 0 ml   Output 750 ml   Net -750 ml      Wt Readings from Last 3 Encounters:   05/08/22 283 lb 8.2 oz (128.6 kg)   05/01/22 (!) 309 lb 8 oz (140.4 kg)   04/26/22 291 lb (132 kg)     General:  Awake, alert, oriented in NAD  Skin:  Warm and dry. No unusual bruising or rash  Neck:  Supple. No JVD appreciated  Chest:  Normal effort.   Diminished, no wheezes/rhonchi/rales  Cardiovascular:  RRR, normal S1/S2, no murmur/gallop/rub  Abdomen:  Soft, + diffuse tenderness, sluggish bowel sounds, NGT with bile drainage  Extremities:  + peripheral edema  Neurological: Moves extremities spontaneously  Psychological: Normal mood and affect      Labs and Tests:  CBC:   Recent Labs     05/06/22  0513 05/07/22  1300   WBC 12.7* 10.3   HGB 7.9* 8.0*   * 433     BMP:    Recent Labs 05/06/22  0513 05/07/22  1300    138   K 4.7 4.2   CL 94* 94*   CO2 26 28   BUN 63* 41*   CREATININE 6.3* 4.5*   GLUCOSE 135* 114*     Hepatic:   Recent Labs     05/06/22 0513   AST 10*   ALT 7*   BILITOT 0.3   ALKPHOS 98       CT Abdomen/Pelvis 5/6/2022:  1. There is a partial small bowel obstruction, with a discrete transition   point noted adjacent to hernia mesh within the anterior lower abdomen.  Just   proximal to the transition point, a small bowel feces sign is seen related to   delayed transit.  This is seen on and around axial image [id number].   2. Mild to moderate stool volume noted with rectosigmoid colonic distention. No functional obstruction related to this however. 3. Several renal cysts are seen bilaterally, many of which appearing normal   in density.  However there are some more hyperdense lesions, including a 3.6   cm left lower pole lesion measuring 40 Hounsfield units.  These would be best   characterized with dedicated MRI of the kidneys per renal mass protocol to   exclude the possibility of a renal neoplasm. 4. Masslike appearance within the uterus suggestive of underlying uterine   leiomyomas, as noted on recent CT imaging of the pelvis on 03/03/2022. Xray Abdomen 5/6/2022:  NG tube courses into the stomach but can be advanced by 5 cm for better   positioning.       Gas distended small bowel loops in the upper abdomen. Xray Abdomen 5/7/2022:  Persistent dilated small bowel loops consistent with a mid small bowel   obstruction, likely partial or intermittent.          Problem List  Principal Problem:    SBO (small bowel obstruction) (HCC)  Active Problems:    ESRD on dialysis ([address]    Chronic respiratory failure requiring continuous mechanical ventilation through tracheostomy ([address]    History of CVA (cerebrovascular accident)    Anemia, chronic disease    Chronic diastolic heart failure ([address]    Essential hypertension    GERD (gastroesophageal reflux disease)    Morbid

## 2022-05-08 NOTE — PROGRESS NOTES
0.9% NS 50 mL/hr ordered continuously. Fluids held at this time as pt continues to bend her arm which occludes the IV located on her Lt AC. Educated pt multiple times the importance of keeping her arm straight to get the fluids. Fluids paused at this time and Hospitalist notified.

## 2022-05-08 NOTE — DISCHARGE INSTR - COC
Continuity of Care Form    Patient Name: Jose Watson   :  [id number]  MRN:  [id number]    Admit date:  2022  Discharge date:  2022    Code Status Order: Full Code   Advance Directives:      Admitting Physician:  Viky Velasco MD  PCP: Blayne Singh MD    Discharging Nurse: [facility] [address] Unit/Room#: 9NG-2786/7111-77  Discharging Unit Phone Number: [phone]    Emergency Contact:   Extended Emergency Contact Information  Primary Emergency Contact: [address] Phone: [phone]  Relation: Child    Past Surgical History:  History reviewed. No pertinent surgical history. Immunization History: There is no immunization history on file for this patient.     Active Problems:  Patient Active Problem List   Diagnosis Code    Unresponsive episode R41.89    Syncope R55    Acute respiratory failure with hypoxia and hypercapnia (HCC) J96.01, J96.02    Pneumonia due to infectious organism J18.9    ESRD on dialysis ([facility] N18.6, Z99.2    Chronic respiratory failure requiring continuous mechanical ventilation through tracheostomy ([facility] J96.10, Z93.0, Z99.11    Acute on chronic respiratory failure with hypoxia (HCC) J96.21    Acute pulmonary edema (HCC) J81.0    Rectal pain K62.89    History of CVA (cerebrovascular accident) Z86.73    HCAP (healthcare-associated pneumonia) J18.9    Anemia, chronic disease D63.8    Anxiety F41.9    Chronic diastolic heart failure (HCC) I50.32    Dysphagia, pharyngeal phase R13.13    Essential hypertension I10    GERD (gastroesophageal reflux disease) K21.9    Morbid obesity with BMI of 50.0-59.9, adult ([facility] E66.01, Z68.43    SILVER (obstructive sleep apnea) G47.33    Abnormal chest x-ray R93.89    Vaginal bleeding N93.9    SBO (small bowel obstruction) ([facility] K56.609       Isolation/Infection:   Isolation            Contact          Patient Infection Status       Infection Onset Added Last Indicated Last Indicated By Review Planned Expiration Resolved Resolved By    MDRO (multi-drug resistant organism) 22 Culture, Respiratory        MRSA 21 Rhiannon Candelaria RN        Added from external infection. Resolved    COVID-19 (Rule Out) 22 COVID-19 & Influenza Combo (Ordered)   22 Louise Finnegan RN    COVID-19 (Rule Out) 22 COVID-19, Rapid (Ordered)   22 Rule-Out Test Resulted    C-diff Rule Out 22 Clostridium difficile toxin/antigen (Ordered)   22 Rhiannon Candelaria RN    Order . COVID-19 (Rule Out) 22 COVID-19 & Influenza Combo (Ordered)   22 Rule-Out Test Resulted    COVID-19 (Rule Out) 01/10/22 01/10/22 01/11/22 COVID-19 & Influenza Combo (Ordered)   22 Rule-Out Test Resulted            Nurse Assessment:  Last Vital Signs: BP (!) 165/81   Pulse 83   Temp 98.6 °F (37 °C) (Axillary)   Resp 18   Wt 283 lb 8.2 oz (128.6 kg)   SpO2 100%   BMI 48.66 kg/m²     Last documented pain score (0-10 scale): Pain Level: 7  Last Weight:   Wt Readings from Last 1 Encounters:   22 283 lb 8.2 oz (128.6 kg)     Mental Status:  oriented and alert    IV Access:  - Central Venous Catheter  - site  left and internal jugular, insertion date: 5/10/2022    Nursing Mobility/ADLs:  Walking   Dependent  Transfer  Dependent  Bathing  Dependent  Dressing  Dependent  Toileting  Dependent  Feeding  Assisted  Med Admin  Assisted  Med Delivery   whole    Wound Care Documentation and Therapy:  Wound 22 Thigh Proximal;Right;Posterior (Active)   Wound Etiology Skin Tear 22   Dressing Status Clean;Dry; Intact 22   Wound Cleansed Soap and water 22   Dressing/Treatment Foam 22   Drainage Amount None 22   Odor None 22   Viviana-wound Assessment Fragile 22   Number of days: 11        Elimination:  Continence:    Bowel: No  Bladder: No  Urinary Catheter: None   Colostomy/Ileostomy/Ileal Conduit: No       Date of Last BM: 5/21/2022    Intake/Output Summary (Last 24 hours) at 5/8/2022 1309  Last data filed at 5/8/2022 1896  Gross per 24 hour   Intake 0 ml   Output 750 ml   Net -750 ml     I/O last 3 completed shifts:  In: -   Out: 1100 [Emesis/NG output:1100]    Safety Concerns: At Risk for Falls and Aspiration Risk    Impairments/Disabilities:      Vision    Nutrition Therapy:  Current Nutrition Therapy:   - Oral Diet:  Clear Liquid    Routes of Feeding: Oral  Liquids:  Thin Liquids  Daily Fluid Restriction: no  Last Modified Barium Swallow with Video (Video Swallowing Test): not done    Treatments at the Time of Hospital Discharge:   Respiratory Treatments: Scheduled and As Needed  Oxygen Therapy:   trach mask FiO2 30 and 10 L/min  Ventilator:    - No ventilator support    Rehab Therapies: Physical Therapy and Occupational Therapy  Weight Bearing Status/Restrictions: No weight bearing restrictions  Other Medical Equipment (for information only, NOT a DME order):  hospital bed  Other Treatments: N/A    RN SIGNATURE:  Electronically signed by Fidela Canavan, RN on 5/21/22 at 10:55 AM EDT    CASE MANAGEMENT/SOCIAL WORK SECTION    Inpatient Status Date: 5-6-22    Readmission Risk Assessment Score:  Readmission Risk              Risk of Unplanned Readmission:  38           Discharging to Facility/ [address] [place] [address])  [address] [place] [address]  Admissions: [phone]  Main:  [phone]  [phone]    Pt to restart current HD schedule after LTAC discharge    [address] [facility]   (Located in [facility])  [address] Age   [place], [facility]  Phone: [phone]  Fax: [phone]  Schedule:   Time:       / signature: Electronically signed by Jonathan Johnson RN on 5/18/2022 at 1:23 PM      PHYSICIAN SECTION    Prognosis: Good    Condition at Discharge: Stable    Rehab Potential (if transferring to Rehab): Good    Recommended Labs or Other Treatments After Discharge: currently was started on clear diet. Advance as tolerated. She was also on TPN Defer to LTAC to continue based on how she does with clears and diet advancement. Physician Certification: I certify the above information and transfer of Ann Rodriguez  is necessary for the continuing treatment of the diagnosis listed and that she requires LTAC for less 30 days.      Update Admission H&P: No change in H&P    PHYSICIAN SIGNATURE:  Electronically signed by Muade Lee MD on 5/21/22 at 10:02 AM EDT

## 2022-05-08 NOTE — PROGRESS NOTES
Order for PIV    Patient evaluated for IV access in left arm. 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 Patient observed for having a noncrompressible vein in left farearm concern for clot. Patient also has extensive swelling in left upper arm. Given circumstances recommend IR consult if access needed. Recommend doppler studies in left arm if access needed in left arm.      Off report given to bedside RN

## 2022-05-08 NOTE — PROGRESS NOTES
[address]  Patient has size 6 Shiley XLT . Trach Kit of same size on standby  Yes, Obturator at head of the bed  Yes. Inner cannula cleaned/replaced Yes, drain sponge changed  Yes, Trach tie replaced is soiled Yes, Flange cleaned  Yes. [address] performed without complications.  Comment suction

## 2022-05-08 NOTE — PROGRESS NOTES
Pt calling out repeatedly throughout the night to be repositioned, getting no relief, getting agitated with staff being unable to get her comfortable. Pt proceeded to call 911 multiple times requesting an emergency aide to have at her bedside. Harpal sent to hospitalist, orders received in [facility]

## 2022-05-08 NOTE — CONSULTS
Department of Gynecology  Consult Note      Reason for Consult:  Vaginal bleeding  Requesting Physician:  Dr. Stef Reed:   Vaginal bleeding noted by nurses    History obtained from patient, electronic medical record    HISTORY OF PRESENT ILLNESS:     The patient is a 80 y.o. female with significant past medical history of multiple medical issues including ESRD, atrial fib, AODM, h/o CVA, anemia of chronic disease, chronic diastolic heart failure, GERD, morbid obesity, h/o respiratory failure with trach, on anticoagulation who presents with small bowel obstruction for this admission. Pt is well known to us due to previous admissions where Gyn consults were requested due to sporadic vaginal bleeding. Her most recent INR on 5/1/22 was 1.3. Pt has declined pelvic exams/evaluations at previous admissions.     meds:  Current Facility-Administered Medications:     QUEtiapine (SEROQUEL) tablet 12.5 mg, 12.5 mg, Oral, Nightly, Stephy López, APRN - CNP    metoprolol (LOPRESSOR) injection 5 mg, 5 mg, IntraVENous, Q6H PRN, Marleen Hylton APRN - CNP, 5 mg at 05/07/22 2021    HYDROmorphone HCl PF (DILAUDID) injection 0.5 mg, 0.5 mg, IntraVENous, Q3H PRN, Stephy López, APRN - CNP, 0.5 mg at 05/08/22 1331    LORazepam (ATIVAN) injection 0.5 mg, 0.5 mg, IntraVENous, Q12H PRN, Stephy López, APRN - CNP, 0.5 mg at 05/08/22 0603    sodium chloride flush 0.9 % injection 5-40 mL, 5-40 mL, IntraVENous, 2 times per day, Kwabena Cheung MD, 10 mL at 05/07/22 2022    0.9 % sodium chloride infusion, , IntraVENous, PRN, Kwabena Cheung MD    ondansetron (ZOFRAN-ODT) disintegrating tablet 4 mg, 4 mg, Oral, Q8H PRN **OR** ondansetron (ZOFRAN) injection 4 mg, 4 mg, IntraVENous, Q6H PRN, Noel Blackwood MD, 4 mg at 05/07/22 0522    acetaminophen (TYLENOL) tablet 650 mg, 650 mg, Oral, Q6H PRN **OR** acetaminophen (TYLENOL) suppository 650 mg, 650 mg, Rectal, Q6H PRN, Kwabena Cheung MD    sodium chloride flush 0.9 % injection 5-40 mL, 5-40 mL, IntraVENous, 2 times per day, Ladonna Israel MD, 10 mL at 05/08/22 0850    sodium chloride flush 0.9 % injection 5-40 mL, 5-40 mL, IntraVENous, PRN, Ladonna Israel MD    0.9 % sodium chloride infusion, , IntraVENous, PRN, Ladonna Israel MD    heparin (porcine) injection 5,000 Units, 5,000 Units, SubCUTAneous, TID, Ladonna Israel MD    polyethylene glycol (GLYCOLAX) packet 17 g, 17 g, Oral, Daily PRN, Ladonna Israel MD    0.9 % sodium chloride infusion, , IntraVENous, Continuous, Ladonna Israel MD, Last Rate: 50 mL/hr at 05/06/22 1803, New Bag at 05/06/22 1803    glucose (GLUTOSE) 40 % oral gel 15 g, 15 g, Oral, PRN, Ladonna Israel MD    glucagon (rDNA) injection 1 mg, 1 mg, IntraMUSCular, PRN, Ladonna Israel MD    dextrose 5 % solution, 100 mL/hr, IntraVENous, PRN, Ladonna Israel MD    insulin lispro (HUMALOG) injection vial 0-6 Units, 0-6 Units, SubCUTAneous, Q4H, Ladonna Israel MD    dextrose bolus 10% 125 mL, 125 mL, IntraVENous, PRN **OR** dextrose bolus 10% 250 mL, 250 mL, IntraVENous, PRN, Ladonna Israel MD    heparin (porcine) injection 1,000 Units, 1,000 Units, IntraVENous, Once, Doreene Hatchet, MD    epoetin claire-epbx (RETACRIT) injection 10,000 Units, 10,000 Units, IntraVENous, Once per day on Mon Wed Fri, Nemo Vela MD, 10,000 Units at 05/06/22 1454    diphenhydrAMINE (BENADRYL) injection 25 mg, 25 mg, IntraVENous, Q6H PRN, ITALO Ferrell - CNP, 25 mg at 05/08/22 0602       Allergies:  Haloperidol lactate and Ziprasidone hcl     Social History:  TOBACCO:   reports that she has quit smoking. She has never used smokeless tobacco.    Family History:   History reviewed. No pertinent family history.      PHYSICAL EXAM:    Vitals:  BP (!) 165/81   Pulse 83   Temp 98.6 °F (37 °C) (Axillary)   Resp 18   Wt 283 lb 8.2 oz (128.6 kg)   SpO2 100%   BMI 48.66 kg/m²     CONSTITUTIONAL:  Somnolent, agitated and does not answer orienting questions except to say she wants to be left alone and declines pelvic exam.  Abd: soft, nontender  Pelvic: small amt of old blood on peripad, unable to perform a pelvic exam.  DATA:  Recent Labs     05/06/22  0513 05/07/22  1300   WBC 12.7* 10.3   HGB 7.9* 8.0*   HCT 25.6* 25.1*   * 433     Recent Labs     05/06/22  0513 05/07/22  1300    138   K 4.7 4.2   CL 94* 94*   CO2 26 28   BUN 63* 41*   CREATININE 6.3* 4.5*   CALCIUM 9.6 9.4   AST 10*  --    ALT 7*  --        Labs:    CBC:   Lab Results   Component Value Date    WBC 10.3 05/07/2022    RBC 2.60 05/07/2022    HGB 8.0 05/07/2022    HCT 25.1 05/07/2022    MCV 96.6 05/07/2022    RDW 18.8 05/07/2022     05/07/2022       IMPRESSION/RECOMMENDATIONS:      Principal Problem:    SBO (small bowel obstruction) ([facility])    Active Problems:    ESRD on dialysis ([facility]    Chronic respiratory failure requiring continuous mechanical ventilation through tracheostomy ([facility]    History of CVA (cerebrovascular accident)    Anemia, chronic disease    Chronic diastolic heart failure ([facility])    Essential hypertension    GERD (gastroesophageal reflux disease)    Morbid obesity with BMI of 50.0-59.9, adult ([facility])    SILVER (obstructive sleep apnea)    Vaginal bleeding off and on from fibroid uterus  Pt is not cooperative with exam.  Would like to speak with family and patient as to the best action under the current circumstances. Will follow with you for now and attempt to speak to family.

## 2022-05-09 ENCOUNTER — APPOINTMENT (OUTPATIENT)
Dept: GENERAL RADIOLOGY | Age: 72
DRG: 335 | End: 2022-05-09
Payer: COMMERCIAL

## 2022-05-09 LAB
ANION GAP SERPL CALCULATED.3IONS-SCNC: 21 MMOL/L (ref 3–16)
BUN BLDV-MCNC: 58 MG/DL (ref 7–20)
CALCIUM SERPL-MCNC: 9.1 MG/DL (ref 8.3–10.6)
CHLORIDE BLD-SCNC: 97 MMOL/L (ref 99–110)
CO2: 21 MMOL/L (ref 21–32)
CREAT SERPL-MCNC: 6.5 MG/DL (ref 0.6–1.2)
EKG ATRIAL RATE: 113 BPM
EKG DIAGNOSIS: NORMAL
EKG Q-T INTERVAL: 334 MS
EKG QRS DURATION: 82 MS
EKG QTC CALCULATION (BAZETT): 433 MS
EKG R AXIS: -1 DEGREES
EKG T AXIS: 4 DEGREES
EKG VENTRICULAR RATE: 101 BPM
GFR AFRICAN AMERICAN: 8
GFR NON-AFRICAN AMERICAN: 6
GLUCOSE BLD-MCNC: 107 MG/DL (ref 70–99)
GLUCOSE BLD-MCNC: 110 MG/DL (ref 70–99)
GLUCOSE BLD-MCNC: 87 MG/DL (ref 70–99)
GLUCOSE BLD-MCNC: 92 MG/DL (ref 70–99)
GLUCOSE BLD-MCNC: 94 MG/DL (ref 70–99)
GLUCOSE BLD-MCNC: 96 MG/DL (ref 70–99)
GLUCOSE BLD-MCNC: 97 MG/DL (ref 70–99)
HCT VFR BLD CALC: 30.2 % (ref 36–48)
HEMOGLOBIN: 8.8 G/DL (ref 12–16)
MCH RBC QN AUTO: 29.5 PG (ref 26–34)
MCHC RBC AUTO-ENTMCNC: 29.1 G/DL (ref 31–36)
MCV RBC AUTO: 101.4 FL (ref 80–100)
PDW BLD-RTO: 19.8 % (ref 12.4–15.4)
PERFORMED ON: ABNORMAL
PERFORMED ON: ABNORMAL
PERFORMED ON: NORMAL
PLATELET # BLD: 389 K/UL (ref 135–450)
PLATELET SLIDE REVIEW: ADEQUATE
PMV BLD AUTO: 7.3 FL (ref 5–10.5)
POTASSIUM SERPL-SCNC: 5.2 MMOL/L (ref 3.5–5.1)
RBC # BLD: 2.98 M/UL (ref 4–5.2)
SLIDE REVIEW: ABNORMAL
SODIUM BLD-SCNC: 139 MMOL/L (ref 136–145)
TSH REFLEX: 1.65 UIU/ML (ref 0.27–4.2)
WBC # BLD: 10.6 K/UL (ref 4–11)

## 2022-05-09 PROCEDURE — 93005 ELECTROCARDIOGRAM TRACING: CPT | Performed by: INTERNAL MEDICINE

## 2022-05-09 PROCEDURE — 6360000002 HC RX W HCPCS: Performed by: HOSPITALIST

## 2022-05-09 PROCEDURE — 2580000003 HC RX 258: Performed by: INTERNAL MEDICINE

## 2022-05-09 PROCEDURE — 36415 COLL VENOUS BLD VENIPUNCTURE: CPT

## 2022-05-09 PROCEDURE — 80048 BASIC METABOLIC PNL TOTAL CA: CPT

## 2022-05-09 PROCEDURE — 6360000002 HC RX W HCPCS: Performed by: NURSE PRACTITIONER

## 2022-05-09 PROCEDURE — 6370000000 HC RX 637 (ALT 250 FOR IP): Performed by: SURGERY

## 2022-05-09 PROCEDURE — 84443 ASSAY THYROID STIM HORMONE: CPT

## 2022-05-09 PROCEDURE — APPNB30 APP NON BILLABLE TIME 0-30 MINS: Performed by: NURSE PRACTITIONER

## 2022-05-09 PROCEDURE — 6360000004 HC RX CONTRAST MEDICATION

## 2022-05-09 PROCEDURE — 94761 N-INVAS EAR/PLS OXIMETRY MLT: CPT

## 2022-05-09 PROCEDURE — 2500000003 HC RX 250 WO HCPCS: Performed by: NURSE PRACTITIONER

## 2022-05-09 PROCEDURE — 2500000003 HC RX 250 WO HCPCS: Performed by: PEDIATRICS

## 2022-05-09 PROCEDURE — 2700000000 HC OXYGEN THERAPY PER DAY

## 2022-05-09 PROCEDURE — 99223 1ST HOSP IP/OBS HIGH 75: CPT | Performed by: INTERNAL MEDICINE

## 2022-05-09 PROCEDURE — 6360000002 HC RX W HCPCS: Performed by: INTERNAL MEDICINE

## 2022-05-09 PROCEDURE — 6370000000 HC RX 637 (ALT 250 FOR IP): Performed by: PEDIATRICS

## 2022-05-09 PROCEDURE — C9113 INJ PANTOPRAZOLE SODIUM, VIA: HCPCS | Performed by: NURSE PRACTITIONER

## 2022-05-09 PROCEDURE — APPNB45 APP NON BILLABLE 31-45 MINUTES: Performed by: NURSE PRACTITIONER

## 2022-05-09 PROCEDURE — 74250 X-RAY XM SM INT 1CNTRST STD: CPT

## 2022-05-09 PROCEDURE — 99232 SBSQ HOSP IP/OBS MODERATE 35: CPT | Performed by: SURGERY

## 2022-05-09 PROCEDURE — 2580000003 HC RX 258: Performed by: HOSPITALIST

## 2022-05-09 PROCEDURE — 1200000000 HC SEMI PRIVATE

## 2022-05-09 PROCEDURE — 93010 ELECTROCARDIOGRAM REPORT: CPT | Performed by: INTERNAL MEDICINE

## 2022-05-09 PROCEDURE — APPSS15 APP SPLIT SHARED TIME 0-15 MINUTES: Performed by: NURSE PRACTITIONER

## 2022-05-09 PROCEDURE — 85027 COMPLETE CBC AUTOMATED: CPT

## 2022-05-09 RX ORDER — METOPROLOL TARTRATE 5 MG/5ML
5 INJECTION INTRAVENOUS ONCE
Status: COMPLETED | OUTPATIENT
Start: 2022-05-09 | End: 2022-05-09

## 2022-05-09 RX ORDER — SODIUM PHOSPHATE, DIBASIC AND SODIUM PHOSPHATE, MONOBASIC 7; 19 G/133ML; G/133ML
1 ENEMA RECTAL ONCE
Status: COMPLETED | OUTPATIENT
Start: 2022-05-09 | End: 2022-05-09

## 2022-05-09 RX ADMIN — METOPROLOL TARTRATE 5 MG: 5 INJECTION INTRAVENOUS at 06:16

## 2022-05-09 RX ADMIN — ONDANSETRON 4 MG: 2 INJECTION INTRAMUSCULAR; INTRAVENOUS at 12:28

## 2022-05-09 RX ADMIN — METOPROLOL TARTRATE 25 MG: 25 TABLET, FILM COATED ORAL at 09:24

## 2022-05-09 RX ADMIN — METOPROLOL TARTRATE 5 MG: 5 INJECTION INTRAVENOUS at 22:45

## 2022-05-09 RX ADMIN — HYDROMORPHONE HYDROCHLORIDE 0.5 MG: 1 INJECTION, SOLUTION INTRAMUSCULAR; INTRAVENOUS; SUBCUTANEOUS at 16:13

## 2022-05-09 RX ADMIN — LORAZEPAM 0.5 MG: 2 INJECTION INTRAMUSCULAR; INTRAVENOUS at 21:21

## 2022-05-09 RX ADMIN — SODIUM CHLORIDE, PRESERVATIVE FREE 10 ML: 5 INJECTION INTRAVENOUS at 01:17

## 2022-05-09 RX ADMIN — SODIUM CHLORIDE, PRESERVATIVE FREE 10 ML: 5 INJECTION INTRAVENOUS at 02:21

## 2022-05-09 RX ADMIN — SODIUM CHLORIDE, PRESERVATIVE FREE 10 ML: 5 INJECTION INTRAVENOUS at 06:17

## 2022-05-09 RX ADMIN — DIPHENHYDRAMINE HYDROCHLORIDE 25 MG: 50 INJECTION, SOLUTION INTRAMUSCULAR; INTRAVENOUS at 01:16

## 2022-05-09 RX ADMIN — Medication 10 ML: at 19:22

## 2022-05-09 RX ADMIN — Medication 10 ML: at 21:22

## 2022-05-09 RX ADMIN — IOHEXOL 200 ML: 350 INJECTION, SOLUTION INTRAVENOUS at 14:24

## 2022-05-09 RX ADMIN — SODIUM PHOSPHATE, DIBASIC AND SODIUM PHOSPHATE, MONOBASIC 1 ENEMA: 7; 19 ENEMA RECTAL at 16:13

## 2022-05-09 RX ADMIN — HEPARIN SODIUM 5000 UNITS: 5000 INJECTION INTRAVENOUS; SUBCUTANEOUS at 21:21

## 2022-05-09 RX ADMIN — PANTOPRAZOLE SODIUM 20 MG: 40 INJECTION, POWDER, FOR SOLUTION INTRAVENOUS at 09:24

## 2022-05-09 RX ADMIN — HEPARIN SODIUM 5000 UNITS: 5000 INJECTION INTRAVENOUS; SUBCUTANEOUS at 15:30

## 2022-05-09 RX ADMIN — HYDROMORPHONE HYDROCHLORIDE 0.5 MG: 1 INJECTION, SOLUTION INTRAMUSCULAR; INTRAVENOUS; SUBCUTANEOUS at 02:20

## 2022-05-09 RX ADMIN — Medication 10 ML: at 09:25

## 2022-05-09 RX ADMIN — HEPARIN SODIUM 5000 UNITS: 5000 INJECTION INTRAVENOUS; SUBCUTANEOUS at 09:24

## 2022-05-09 RX ADMIN — DIPHENHYDRAMINE HYDROCHLORIDE 25 MG: 50 INJECTION, SOLUTION INTRAMUSCULAR; INTRAVENOUS at 21:22

## 2022-05-09 RX ADMIN — HYDROMORPHONE HYDROCHLORIDE 0.5 MG: 1 INJECTION, SOLUTION INTRAMUSCULAR; INTRAVENOUS; SUBCUTANEOUS at 19:21

## 2022-05-09 RX ADMIN — HYDROMORPHONE HYDROCHLORIDE 0.5 MG: 1 INJECTION, SOLUTION INTRAMUSCULAR; INTRAVENOUS; SUBCUTANEOUS at 09:24

## 2022-05-09 RX ADMIN — SODIUM CHLORIDE, PRESERVATIVE FREE 10 ML: 5 INJECTION INTRAVENOUS at 22:45

## 2022-05-09 ASSESSMENT — PAIN DESCRIPTION - LOCATION
LOCATION: ABDOMEN

## 2022-05-09 ASSESSMENT — PAIN DESCRIPTION - ORIENTATION: ORIENTATION: RIGHT

## 2022-05-09 ASSESSMENT — PAIN DESCRIPTION - DESCRIPTORS
DESCRIPTORS: ACHING

## 2022-05-09 ASSESSMENT — PAIN - FUNCTIONAL ASSESSMENT: PAIN_FUNCTIONAL_ASSESSMENT: PREVENTS OR INTERFERES SOME ACTIVE ACTIVITIES AND ADLS

## 2022-05-09 ASSESSMENT — PAIN SCALES - GENERAL
PAINLEVEL_OUTOF10: 0
PAINLEVEL_OUTOF10: 10
PAINLEVEL_OUTOF10: 0
PAINLEVEL_OUTOF10: 10
PAINLEVEL_OUTOF10: 7
PAINLEVEL_OUTOF10: 10

## 2022-05-09 NOTE — PROGRESS NOTES
[facility], [place], operated by [facility]   Electrophysiology Nurse Practitioner  Pre-Consult Rounding  Date: 2022  Date of admission: 2022  4:03 AM  Reason for Admission: SBO (small bowel obstruction) [facility]) [E94.360]  Consult Requesting Physician: Rancho Lucio MD     Max Castro is a 80 y.o. female with past medical history of hypertension, ESRD, anemia, bleeding uterine fibroid, morbid obesity, chronic respiratory failure with chronic trach, CVA, DM2, atrial fibrillation and DVT on warfarin who presented to hospital with abd pain, N/V and SOB from [facility]. Found to have SBO in the ER. Developed atrial fibrillation and EP was consulted. Hospitalized 2022 after unresponsive episode at HD. She had echo at that time showed EF 45%. At that time there was no plans to peruse ischemic evaluation due to lack of symptoms and comorbidities. Plans for small bowel follow through today. Telemetry: SR with 1st degree AVB, PAF overnight roughly 0449-1599 with rates 100-110's    EC2022: Atrial fibrillation     ECHO:  2022   Summary   Definity contrast administered. Left ventricular systolic function is mildly reduced with visually estimated   EF of 45%. Endocardium not entirely well visualized but no obvious segmental wall   motion abnormalities. Diastolic dysfunction grade and filling pressure are indeterminate. Mild concentric left ventricular hypertrophy. Unable to obtain SPAP due to lack of tricuspid regurgitation. Valves are not well visualized but there are no obvious structural   abnormalities or color flow abnormalities seen on doppler. 2019 [facility].  60%  Stress Test: none  Cath: none    Lab Results   Component Value Date    LVEF 45 2022     No results found for: TSHFT4, TSH    Plan:   Paroxysmal Atrial Fibrillation  - SR with 1st degree AVB on telemetry, PAF overnight 8292-2054  - On Lopressor 25 mg bid  - [id number] score: 9 (age, gender, hypertension, DM, CVA, CHF); [id number] Vasc score and anticoagulation discussed. High risk for stroke and thromboembolism. Anticoagulation is recommended.   ~ Resume warfarin when vaginal bleeding stable  - Afib risk factors including age, HTN, obesity, inactivity and SILVER were discussed with patient. Risk factor modification recommended   ~ TSH added   Chronic Respiratory Failure   - S/p Trach    - Per primary team  ESRD/Hyperkalemia   - On HD  Anemia   - Likely due to chronic disease   - ESAs with HD per nephrology   - Improved today   Hypertension   - Uncontrolled. IVF has been stopped   - Allow for higher HR with hx of LOC  Vaginal Bleeding   - Declines exam this admission   - In the past has been due to fibroids   DVT   - On warfarin PTA, currently on hold      Converted to SR. Currently she is down for testing/procedure. She has hx of AF and is on warfarin at baseline. Anemia is at baseline. No need for any changes at this time. EP physician will see for full consult. Will discuss the plan for EP attending. Full consult note to follow.

## 2022-05-09 NOTE — SIGNIFICANT EVENT
HR variability noted overnight. EKG with Atrial fibrillation, patient not hypotensive, is able to sustain her blood pressures. PMH supports a history of unspecified atrial fibrillation previously. Continue telemetry   Metoprolol 5 mg IV x 1. Metoprolol 25 mg po BID - goal of rate control (HR < 100).    Cardiology consult       Nora Hawk MD

## 2022-05-09 NOTE — PROGRESS NOTES
[facility] and Laparoscopic Surgery        Progress Note    Pt Name: Simeon Swanson  MRN: [id number]  [place]: 1950  Date of evaluation: 2022    Chief Complaint: Abdominal pain      Subjective:    No acute events overnight  Pain improving, minimal at rest, worse with pressure to abdomen  No nausea or vomiting with NGT in place  No recent stool  Resting in bed at this time      Vital Signs:  Patient Vitals for the past 24 hrs:   BP Temp Temp src Pulse Resp SpO2 Height Weight   22 1322 -- -- -- -- -- -- 5' 4\" (1.626 m) --   22 1232 -- -- -- -- 16 96 % -- --   22 1113 (!) 140/77 -- Oral 119 18 97 % -- --   22 0735 -- -- -- -- 18 97 % -- --   22 0715 129/74 98.7 °F (37.1 °C) Oral 119 18 98 % -- --   22 0645 -- -- -- 150 -- -- -- --   22 0600 -- -- -- -- -- -- -- 266 lb (120.7 kg)   22 0530 (!) 114/59 99.4 °F (37.4 °C) Oral 101 18 100 % -- --   22 0506 -- -- -- -- -- -- -- 266 lb 11.2 oz (121 kg)   22 0410 -- -- -- -- -- 100 % -- --   22 0342 (!) 171/70 99.1 °F (37.3 °C) Oral 89 20 100 % -- --   22 0250 -- -- -- -- 20 -- -- --   22 0220 -- -- -- -- 20 -- -- --   22 2327 (!) 149/70 99.4 °F (37.4 °C) Oral 90 18 97 % -- --   22 2200 -- -- -- -- -- 97 % -- --   22 1915 (!) 153/72 98.4 °F (36.9 °C) Axillary 90 20 97 % -- --   22 1755 -- -- -- -- 18 99 % -- --   22 1700 (!) 156/83 -- -- 91 17 -- -- --   22 1530 (!) 172/81 97.9 °F (36.6 °C) Axillary 90 18 100 % -- --      TEMPERATURE HISTORY 24H: Temp (24hrs), Av.8 °F (37.1 °C), Min:97.9 °F (36.6 °C), Max:99.4 °F (37.4 °C)    BLOOD PRESSURE HISTORY: Systolic (32ORL), EAM:307 , Min:114 , WJK:340    Diastolic (37RCG), ZGO:38, Min:59, Max:83      Intake/Output:    I/O last 3 completed shifts: In: 48 [P.O.:50]  Out: 1475 [Urine:100; Emesis/NG output:1375]  No intake/output data recorded.   Drain/tube Output:           Physical Exam:  General: awake, alert, answers questions appropriately but converses minimally  Cardiac: regular rate and rhythm   Pulmonary: clear to auscultation bilaterally   Abdomen: soft, mild distension, mild upper/epigastric abdominal tenderness only, bowel sounds present    Labs:  CBC:    Recent Labs     05/07/22  1300 05/09/22  0457   WBC 10.3 10.6   HGB 8.0* 8.8*   HCT 25.1* 30.2*    389     BMP:    Recent Labs     05/07/22  1300 05/09/22  0458    139   K 4.2 5.2*   CL 94* 97*   CO2 28 21   BUN 41* 58*   CREATININE 4.5* 6.5*   GLUCOSE 114* 94     Hepatic:   No results for input(s): AST, ALT, ALB, BILITOT, ALKPHOS in the last 72 hours. Amylase: No results for input(s): AMYLASE in the last 72 hours. Lipase: No results for input(s): LIPASE in the last 72 hours. Mag:    No results for input(s): MG in the last 72 hours. Phos:     No results for input(s): PHOS in the last 72 hours. Coags: No results for input(s): INR, APTT in the last 72 hours. Cultures:  Relevant cultures documented under results section     Pathology:   No relevant pathology     Imaging:  I have personally reviewed the following films:    No results found.     Scheduled Meds:   metoprolol tartrate  25 mg Oral BID    iohexol        QUEtiapine  12.5 mg Oral Nightly    pantoprazole  20 mg IntraVENous Daily    sodium chloride flush  5-40 mL IntraVENous 2 times per day    sodium chloride flush  5-40 mL IntraVENous 2 times per day    heparin (porcine)  5,000 Units SubCUTAneous TID    insulin lispro  0-6 Units SubCUTAneous Q4H    heparin (porcine)  1,000 Units IntraVENous Once    epoetin claire-epbx  10,000 Units IntraVENous Once per day on Mon Wed Fri     Continuous Infusions:   sodium chloride      sodium chloride      dextrose       PRN Meds:.metoprolol, HYDROmorphone, LORazepam, sodium chloride, ondansetron **OR** ondansetron, acetaminophen **OR** acetaminophen, sodium chloride flush, sodium chloride, polyethylene glycol, glucose, glucagon (rDNA), dextrose, dextrose bolus **OR** dextrose bolus, diphenhydrAMINE      Assessment:  Partial small bowel obstruction  Fecal impaction      Plan:  1. Pain and distention improved over weekend, no recent BM, moderate NGT output; continued supportive care, check small bowel follow through today  2. NPO with NGT decompression; monitor bowel function  3. IV hydration; monitor and correct electrolytes  4. Activity as tolerated  5. PRN analgesics and antiemetics--minimizing narcotics as tolerated  6. Management of medical comorbid etiologies per primary team and consulting services    EDUCATION:  Educated patient on plan of care and disease process--all questions answered. Plans discussed with patient and nursing. Reviewed and discussed with Dr. Imani Cifuentes. Signed:  ITALO Watters - CNP  5/9/2022 2:28 PM   Surgery Staff:     Pt seen and examined with NP  See full note above  Pt has mild epigastric pain, mild distension.    Improved status from admit, but not completely better  Will do SBFT and a fleets enema also  Continue NPO and NG plan for now    Yoni Weaver MD

## 2022-05-09 NOTE — CARE COORDINATION
Medical/surgical update was routed to [facility]. Please complete & sign the AMADEO/AVS/Prescriptions,  RN please print AMADEO/AVS once completed.  Thank you, Lilia Sadler, MSW, [phone]

## 2022-05-09 NOTE — CONSULTS
[address]   Electrophysiology Consultation   Date: 5/9/2022  Reason for Consultation: Atrial fibrillation   Consult Requesting Physician: Cynthia Santiago MD   Chief Complaint   Patient presents with    Other     Patient has missed dialysis txs, states is in pain and wants meds, pt has trach. CC: abdominal pain, nausea and vomiting    HPI: Han Subramanian is a 80 y.o. female with multiple comorbidities including ESRD, HTN, chronic respiratory failure with chronic trach, DM, DVT who has been hospitalized with abdominal pain and SBO. Developed atrial fibrillation and EP has been consulted. Patient is on anticoagulation for DVT. She has morbid obesity. Assessment:   - PAF   - ESRD   - HTN  - Morbid obesity : Body mass index is 45.66 kg/m². - DM  - DVT  - SBO    Plan:   - Patient has converted back to sinus rhythm. - Afib in the setting of SBO and acute illness. Treatment of underlying condition is main therapy. - She was on metoprolol which has been held due to SBO  - Resume metoprolol   - High [id number]-Iscs score and high risk for stroke and thromboembolism. Anticoagulation is recommended. - On warfarin for DVT. - Resume it when [id number] Huyen Wang with primary team.   - S/p trach for respiratory failure. - ESRD and hyperkalemia on HD  - DVT: was on warfarin. No EP intervention is recommended. She is at risk for atrial arrhythmia due to multiple co morbidities during acute illness. Treatment of underlying condition and rate control is main therapy. Will sign off. Discussed with nursing staff.        Active Hospital Problems    Diagnosis Date Noted    Fecal impaction [facility]) [K56.41]      Priority: Medium    SBO (small bowel obstruction) ([facility] [I28.370] 05/06/2022     Priority: Medium    ESRD on dialysis ([facility] [N18.6, Z99.2]     Chronic respiratory failure requiring continuous mechanical ventilation through tracheostomy ([facility] [facility] [J96.10, Z93.0, Z99.11]     History of CVA (cerebrovascular accident) [Z86.73]     GERD (gastroesophageal reflux disease) [K21.9] 10/10/2021    Morbid obesity with BMI of 50.0-59.9, adult ([facility] [E66.01, Z68.43] 10/10/2021    Anemia, chronic disease [D63.8] 2021    SILVER (obstructive sleep apnea) [G47.33] 2021    Chronic diastolic heart failure ([facility] [I50.32] 10/23/2018    Essential hypertension [I10] 2017       Diagnostic studies:     EC2022: Atrial fibrillation      ECHO:  2022   Summary   Definity contrast administered.   Left ventricular systolic function is mildly reduced with visually estimated   EF of 45%.   Endocardium not entirely well visualized but no obvious segmental wall   motion abnormalities.   Diastolic dysfunction grade and filling pressure are indeterminate.   Mild concentric left ventricular hypertrophy.   Unable to obtain SPAP due to lack of tricuspid regurgitation.   Valves are not well visualized but there are no obvious structural   abnormalities or color flow abnormalities seen on doppler.   2019 [facility]. 60%      I independently reviewed the cardiac diagnostic studies, ECG and relevant imaging studies.      Lab Results   Component Value Date    LVEF 45 2022     No results found for: TSHFT4, TSH    Physical Examination:  Vitals:    22 1617   BP: (!) 148/62   Pulse: 82   Resp: 18   Temp: 97.9 °F (36.6 °C)   SpO2: 98%      In: 50 [P.O.:50]  Out: 725    Wt Readings from Last 3 Encounters:   22 266 lb (120.7 kg)   22 (!) 309 lb 8 oz (140.4 kg)   22 291 lb (132 kg)     Temp  Av.7 °F (37.1 °C)  Min: 97.9 °F (36.6 °C)  Max: 99.4 °F (37.4 °C)  Pulse  Av.9  Min: 82  Max: 150  BP  Min: 114/59  Max: 173/65  SpO2  Av.1 %  Min: 96 %  Max: 100 %  FiO2   Av %  Min: 35 %  Max: 40 %    Intake/Output Summary (Last 24 hours) at 2022 1682  Last data filed at 2022 6164  Gross per 24 hour   Intake --   Output 725 ml   Net -725 ml         I independently reviewed all cardiac tracing from cardiac telemetry. · Constitutional: Oriented. No distress. · Head: Normocephalic and atraumatic. · Mouth/Throat: Oropharynx is clear and moist.   · Eyes: Conjunctivae normal. EOM are normal.   · Neck: Neck supple. No JVD present. + trach   · Cardiovascular: Normal rate, regular rhythm, S1&S2. · Pulmonary/Chest: Bilateral respiratory sounds. No rhonchi. · Abdominal: Soft. No tenderness. + obese   · Musculoskeletal: No tenderness. No edema    · Lymphadenopathy: Has no cervical adenopathy. · Neurological: Alert and oriented. Follows command, No Gross deficit   · Skin: Skin is warm, No rash noted. · Psychiatric: Has a normal behavior       Scheduled Meds:   metoprolol tartrate  25 mg Oral BID    QUEtiapine  12.5 mg Oral Nightly    pantoprazole  20 mg IntraVENous Daily    sodium chloride flush  5-40 mL IntraVENous 2 times per day    sodium chloride flush  5-40 mL IntraVENous 2 times per day    heparin (porcine)  5,000 Units SubCUTAneous TID    insulin lispro  0-6 Units SubCUTAneous Q4H    heparin (porcine)  1,000 Units IntraVENous Once    epoetin claire-epbx  10,000 Units IntraVENous Once per day on Mon Wed Fri     Continuous Infusions:   sodium chloride      sodium chloride      dextrose       PRN Meds:.metoprolol, HYDROmorphone, LORazepam, sodium chloride, ondansetron **OR** ondansetron, acetaminophen **OR** acetaminophen, sodium chloride flush, sodium chloride, polyethylene glycol, glucose, glucagon (rDNA), dextrose, dextrose bolus **OR** dextrose bolus, diphenhydrAMINE     Review of System:  [x] Full ROS obtained and negative except as mentioned in HPI    Prior to Admission medications    Medication Sig Start Date End Date Taking?  Authorizing Provider   warfarin (COUMADIN) 3 MG tablet Take 1 tablet by mouth nightly 4/30/22   Tami Boyle MD   hydrocortisone (ANUSOL-HC) 2.5 % CREA rectal cream Place rectally 2 times daily 3/3/22   DO les Scottoprolol succinate (TOPROL XL) 25 MG extended release tablet Take 0.5 tablets by mouth daily 2/22/22   Tabitha Queen MD   insulin glargine (LANTUS) 100 UNIT/ML injection vial Inject 15 Units into the skin daily    Historical Provider, MD   aspirin 81 MG EC tablet Take 81 mg by mouth daily    Historical Provider, MD   cyanocobalamin 1000 MCG tablet Take 500 mcg by mouth daily    Historical Provider, MD   QUEtiapine (SEROQUEL) 25 MG tablet Take 12.5 mg by mouth nightly    Historical Provider, MD   sodium polystyrene (KAYEXALATE) 15 GM/60ML suspension Take 15 g by mouth 4 times daily Sun, Mon, Wed, Fri    Historical Provider, MD   calcium acetate 667 MG TABS Take 1,334 mg by mouth 3 times daily (with meals)    Historical Provider, MD   hydrocortisone (PREPARATION H) 1 % cream Apply topically 2 times daily Apply topically 2 times daily.     Historical Provider, MD   acetaminophen (TYLENOL) 325 MG tablet Take 650 mg by mouth every 6 hours as needed for Pain    Historical Provider, MD   atorvastatin (LIPITOR) 80 MG tablet Take 80 mg by mouth at bedtime    Historical Provider, MD   diphenhydrAMINE (BENADRYL) 25 MG capsule Take 25 mg by mouth every 8 hours as needed     Historical Provider, MD   docusate sodium (COLACE) 100 MG capsule Take 100 mg by mouth 2 times daily    Historical Provider, MD   lactulose encephalopathy 10 GM/15ML SOLN solution Take 20 g by mouth daily as needed     Historical Provider, MD   polyethylene glycol (GLYCOLAX) 17 g packet Take 17 g by mouth daily as needed for Constipation    Historical Provider, MD   insulin NPH (HUMULIN N;NOVOLIN N) 100 UNIT/ML injection vial Inject into the skin every 6 hours     Historical Provider, MD   ipratropium-albuterol (DUONEB) 0.5-2.5 (3) MG/3ML SOLN nebulizer solution Inhale 1 vial into the lungs every 4 hours    Historical Provider, MD   furosemide (LASIX) 20 MG tablet Take 20 mg by mouth three times a week [facility]    Historical Provider, MD   loperamide (IMODIUM) 2 MG capsule Take 2 mg by mouth 4 times daily as needed for Diarrhea    Historical Provider, MD   midodrine (PROAMATINE) 5 MG tablet Take 10 mg by mouth three times a week [facility] pre dialysis    Historical Provider, MD   dextromethorphan-guaiFENesin (MUCINEX DM)  MG per extended release tablet Take 1 tablet by mouth 2 times daily     Historical Provider, MD   omeprazole (PRILOSEC) 20 MG delayed release capsule Take 20 mg by mouth daily    Historical Provider, MD   chlorhexidine (PERIDEX) 0.12 % solution Take 15 mLs by mouth 2 times daily    Historical Provider, MD   sennosides-docusate sodium (SENOKOT-S) 8.6-50 MG tablet Take 2 tablets by mouth at bedtime     Historical Provider, MD   sertraline (ZOLOFT) 50 MG tablet Take 50 mg by mouth daily    Historical Provider, MD   terazosin (HYTRIN) 1 MG capsule Take 1 mg by mouth nightly    Historical Provider, MD   ondansetron (ZOFRAN) 4 MG tablet Take 4 mg by mouth every 8 hours as needed for Nausea or Vomiting    Historical Provider, MD       Past Medical History:   Diagnosis Date    Acute and chronic respiratory failure (acute-on-chronic) ([address]     Acute blood loss anemia 7/1/2020    Acute deep vein thrombosis (DVT) of brachial vein of left upper extremity ([address] 2/20/2019    Formatting of this note might be different from the original. Dx February 2019    Anxiety disorder     Atherosclerotic heart disease of native coronary artery without angina pectoris     Bleeding hemorrhoids 6/29/2020    Cerebellar infarct ([address] 9/29/2019    Cerebral infarction ([address]     Chronic pain syndrome     Dependence on renal dialysis ([address]     Dependence on respirator ([address]     End stage renal disease ([address]     Gastro-esophageal reflux disease with esophagitis, without bleeding     Insomnia     Major depressive disorder, recurrent ([address]     Morbid obesity due to excess calories ([address]     Muscle weakness     Obstructive sleep apnea (adult) (pediatric)     Other hyperlipidemia     Other voice and resonance disorders     Personal history of COVID-19     Pulmonary hypertension ([facility]     Tracheostomy status ([facility]     Type 2 diabetes mellitus without complications ([facility]     Unspecified atrial fibrillation ([facility] [facility]         History reviewed. No pertinent surgical history. Allergies   Allergen Reactions    Haloperidol Lactate      Other reaction(s): Unknown (See Comments)  PT DOESN'T REMEMBER  Other reaction(s): Unknown (See Comments)  PT DOESN'T REMEMBER      Ziprasidone Hcl      Other reaction(s): Other (See Comments), Unknown (See Comments)  PT DOESN'T REMEMBER  PT DOESN'T REMEMBER  Other reaction(s): Unknown (See Comments)  PT DOESN'T REMEMBER         Social History:  Reviewed. reports that she has quit smoking. She has never used smokeless tobacco. She reports that she does not drink alcohol and does not use drugs. Family History:  Reviewed. Reviewed. No family history of SCD. Relevant and available labs, and cardiovascular diagnostics reviewed. Reviewed. Recent Labs     05/07/22  1300 05/09/22  0458    139   K 4.2 5.2*   CL 94* 97*   CO2 28 21   BUN 41* 58*   CREATININE 4.5* 6.5*     Recent Labs     05/07/22  1300 05/09/22  0457   WBC 10.3 10.6   HGB 8.0* 8.8*   HCT 25.1* 30.2*   MCV 96.6 101.4*    389     Estimated Creatinine Clearance: 10 mL/min (A) (based on SCr of 6.5 mg/dL [facility])). No results found for: BNP    I independently reviewed all cardiac tracing from cardiac telemetry. I independently reviewed relevant and available cardiac diagnostic tests ECG, CXR, Echo, Stress test, Device interrogation, Holter, CT scan. Outside medical records via Care everywhere reviewed and summarized in H&P above. Complex medical condition with multiple medical problems affecting prognosis and outcome of EP interventions  Severe exacerbation of underlying medical condition requiring hospitalization and at risk of decompensation.       All questions and concerns were addressed to the patient/family. Alternatives to my treatment were discussed. I have discussed the above stated plan and the patient verbalized understanding and agreed with the plan. NOTE: This report was transcribed using voice recognition software. Every effort was made to ensure accuracy, however, inadvertent computerized transcription errors may be present.      Nora Butterfield MD, MPH  [facility]   Office: [phone]  Fax: [phone]

## 2022-05-09 NOTE — PROGRESS NOTES
Pt heart rhythm changed to atrial fibrillation;12-lead EKG ordered & confirmed; hospitalist saw pt at bedside & will enter orders.     VSS

## 2022-05-09 NOTE — PROGRESS NOTES
Nutrition Note    RECOMMENDATIONS  1. PO Diet: continue NPO; when advanced, recommend ASHANTI, low K+, dysphagia minced and moist (ECF Diet: liberalized renal, mechanical soft)     NUTRITION ASSESSMENT   Pt admitted from ECF with chronic trach and c/o abdominal pain, nausea and emesis. Pt with Small bowel obstruction partial and vaginal bleeding. Surgery following for conservative management of partial SBO. Nutrition evaluation triggered d/t stage II wound to thigh. Currently NPO, on liberal renal / mechanical soft diet at [facility]. Will follow for diet adv vs alternative nutrition as appropriate.  Nutrition Related Findings: trach/vent; NG suction; K+ 5.2, BUN 58, Creat 6.5; LBM 5/8   Wounds: Pressure Injury,Stage II (thigh)   Nutrition Education:  Education not indicated    Nutrition Goals: within 2 days,other (specify)     MALNUTRITION ASSESSMENT      Malnutrition Status: No malnutrition  Findings of the 6 clinical characteristics of malnutrition:      NUTRITION DIAGNOSIS   · Increased nutrient needs related to increase demand for energy/nutrients as evidenced by wounds      CURRENT NUTRITION THERAPIES  Diet NPO Exceptions are: Ice Chips, Sips of Water with Meds, Sips of Clear Liquids, Popsicles     PO Intake: NPO   PO Supplement Intake:NPO      ANTHROPOMETRICS   Current Height: 5' 4\" (162.6 cm)   Current Weight: 266 lb (120.7 kg)     Ideal Body Weight (IBW): 120 lbs  (55 kg)        BMI: 45.6      COMPARATIVE STANDARDS  Energy (kcal):  968 - 1815     Protein (g):  110       Fluid (mL/day):  968 - 1815    The patient will be monitored per nutrition standards of care. Consult dietitian if additional nutrition interventions are needed prior to RD reassessment.      Ferdinand Toussaint RD, LD    Contact: [phone]

## 2022-05-09 NOTE — PLAN OF CARE
Problem: Pain  Goal: Verbalizes/displays adequate comfort level or baseline comfort level  5/9/2022 1236 by Bo Jenkins RN  Outcome: Progressing     Problem: Skin/Tissue Integrity  Goal: Absence of new skin breakdown  Description: 1. Monitor for areas of redness and/or skin breakdown  2. Assess vascular access sites hourly  3. Every 4-6 hours minimum:  Change oxygen saturation probe site  4. Every 4-6 hours:  If on nasal continuous positive airway pressure, respiratory therapy assess nares and determine need for appliance change or resting period.   5/9/2022 1236 by Bo Jenkins RN  Outcome: Progressing     Problem: Safety - Adult  Goal: Free from fall injury  5/9/2022 1236 by Bo Jenkins RN  Outcome: Progressing     Problem: Neurosensory - Adult  Goal: Achieves maximal functionality and self care  5/9/2022 1236 by Bo Jenkins RN  Outcome: Progressing     Problem: Respiratory - Adult  Goal: Achieves optimal ventilation and oxygenation  5/9/2022 1236 by Bo Jenkins RN  Outcome: Progressing     Problem: Cardiovascular - Adult  Goal: Maintains optimal cardiac output and hemodynamic stability  5/9/2022 1236 by Bo Jenkins RN  Outcome: Progressing     Problem: Cardiovascular - Adult  Goal: Absence of cardiac dysrhythmias or at baseline  5/9/2022 1236 by Bo Jenkins RN  Outcome: Progressing     Problem: Skin/Tissue Integrity - Adult  Goal: Skin integrity remains intact  5/9/2022 1236 by Bo Jenkins RN  Outcome: Progressing     Problem: Musculoskeletal - Adult  Goal: Return mobility to safest level of function  5/9/2022 1236 by Bo Jenkins RN  Outcome: Progressing     Problem: Gastrointestinal - Adult  Goal: Minimal or absence of nausea and vomiting  5/9/2022 1236 by Bo Jenkins RN  Outcome: Progressing

## 2022-05-09 NOTE — PLAN OF CARE
Pt was able to identify three goals at beginning of shift:    1. Airway - suction trach as needed  2. Pain management - as needed pain medication  3. Position changes to promote skin integrity    Pt has been able to maintain oxygen saturation, trach in place 4 L humidified oxygen; Respiratory therapy did suction pt several times to remove thick secretions & plugs. Pt's pain controlled with PRN pain medication and warm blankets. Several non-pharmaceutical techniques were tried including relaxation, distraction, and position changes. Pt did allow position changes every two hours and used call light to request position changes more frequently. Pt has been transferred to therapeutic bariatric pressure alternating bed at 0300. Pt expressed satisfaction. Arevalo catheter was removed to promote skin integrity and per hospital protocol. Hospitalist agreed. Pt continues to use call light to express needs & concerns. NG tube remains in place, see flowsheets. Pt understands treatment plan and benefits from reinforcement. Standard safety precautions remain in place; door open at pt request.    Addendum at [phone]: pt sleeping; increased frequency of rounds continue for pt safety & comfort. Problem: Discharge Planning  Goal: Discharge to home or other facility with appropriate resources  Outcome: Progressing     Problem: Pain  Goal: Verbalizes/displays adequate comfort level or baseline comfort level  Outcome: Progressing     Problem: Skin/Tissue Integrity  Goal: Absence of new skin breakdown  Description: 1. Monitor for areas of redness and/or skin breakdown  2. Assess vascular access sites hourly  3. Every 4-6 hours minimum:  Change oxygen saturation probe site  4. Every 4-6 hours:  If on nasal continuous positive airway pressure, respiratory therapy assess nares and determine need for appliance change or resting period.   Outcome: Progressing     Problem: Safety - Adult  Goal: Free from fall

## 2022-05-09 NOTE — PROGRESS NOTES
appreciated. Access: Left upper arm AV graft +bruit. Labs:  CBC:   Lab Results   Component Value Date    WBC 10.6 05/09/2022    RBC 2.98 05/09/2022    HGB 8.8 05/09/2022    HCT 30.2 05/09/2022    .4 05/09/2022    MCH 29.5 05/09/2022    MCHC 29.1 05/09/2022    RDW 19.8 05/09/2022     05/09/2022    MPV 7.3 05/09/2022     BMP:    Lab Results   Component Value Date     05/09/2022    K 5.2 05/09/2022    K 4.7 05/06/2022    CL 97 05/09/2022    CO2 21 05/09/2022    BUN 58 05/09/2022    LABALBU 3.2 05/06/2022    CREATININE 6.5 05/09/2022    CALCIUM 9.1 05/09/2022    GFRAA 8 05/09/2022    LABGLOM 6 05/09/2022    GLUCOSE 94 05/09/2022       Assessment/Plan:    ESRD   on HD TTS at [address]  -last outpatient HD on 5/3  -missed 5/5  -Got HD 5/6 x 2 hours  - HD back to Tues/Thurs/Sat schedule. She cut her treatment off after 50 minutes Saturday and declines dialysis today  Next HD in AM   Stop IVF for now    Hyperkalemia mild  Due to incomplete dialysis  NPO for now  Hold off on K binders in the setting of bowel obstruction     Small bowel obstruction partial   On conservative management for now per surgery    Vaginal bleeding  Possibly from fibroid uterus     Anemia in CKD  Hb below goal  Continue ESAs with HD    Hypertension not very well controlled  Stop IVF    Chronic resp failure: s/p trach    Renal cysts bilateral  Left renal lesion  Needs further imaging to characterize        Johnny Morin MD  [facility]  Office Number: [phone]

## 2022-05-09 NOTE — PROGRESS NOTES
[facility] PROGRESS NOTE    5/9/2022 1:04 PM        Name: Nehemias Otero . Admitted: 5/6/2022  Primary Care Provider: Medina Bowman MD (Tel: [phone])      Chief Complaint   Patient presents with    Other     Patient has missed dialysis txs, states is in pain and wants meds, pt has trach. Hospital Course: Patient is a 81 yo female with hx ESRD on HD, chronic respiratory failure s/p trach, CVA, DVT on warfarin, morbid obesity, anemia, DM2, dCHF, afib on Coumadin. Patient resides at [facility] and was sent to ER with c/o abdominal pain, n/v. She was found to have SBO. NG has been placed. Admitted for further management and surgical consult. Rapid response 5/8 for atrial fibrillation with variable HR. Received metoprolol 5 mg IV x 1. Back in sinus rhythm. Subjective:  Asleep, awakens easily. Offers no new complaints. Says abdomen feels better but uncomfortable when pushed on. Says she is hungry. Has NGT to suction draining bile colored fluid. Has not had BM.      Reviewed interval ancillary notes    Current Medications  metoprolol tartrate (LOPRESSOR) tablet 25 mg, BID  QUEtiapine (SEROQUEL) tablet 12.5 mg, Nightly  pantoprazole (PROTONIX) injection 20 mg, Daily  metoprolol (LOPRESSOR) injection 5 mg, Q6H PRN  HYDROmorphone HCl PF (DILAUDID) injection 0.5 mg, Q3H PRN  LORazepam (ATIVAN) injection 0.5 mg, Q12H PRN  sodium chloride flush 0.9 % injection 5-40 mL, 2 times per day  0.9 % sodium chloride infusion, PRN  ondansetron (ZOFRAN-ODT) disintegrating tablet 4 mg, Q8H PRN   Or  ondansetron (ZOFRAN) injection 4 mg, Q6H PRN  acetaminophen (TYLENOL) tablet 650 mg, Q6H PRN   Or  acetaminophen (TYLENOL) suppository 650 mg, Q6H PRN  sodium chloride flush 0.9 % injection 5-40 mL, 2 times per day  sodium chloride flush 0.9 % injection 5-40 mL, PRN  0.9 % sodium chloride infusion, PRN  heparin (porcine) injection 5,000 Units, TID  polyethylene glycol (GLYCOLAX) packet 17 g, Daily PRN  glucose (GLUTOSE) 40 % oral gel 15 g, PRN  glucagon (rDNA) injection 1 mg, PRN  dextrose 5 % solution, PRN  insulin lispro (HUMALOG) injection vial 0-6 Units, Q4H  dextrose bolus 10% 125 mL, PRN   Or  dextrose bolus 10% 250 mL, PRN  heparin (porcine) injection 1,000 Units, Once  epoetin claire-epbx (RETACRIT) injection 10,000 Units, Once per day on Mon Wed Fri  diphenhydrAMINE (BENADRYL) injection 25 mg, Q6H PRN        Objective:  BP (!) 140/77   Pulse 119   Temp 98.7 °F (37.1 °C) (Oral)   Resp 16   Wt 266 lb (120.7 kg)   SpO2 96%   BMI 45.66 kg/m²     Intake/Output Summary (Last 24 hours) at 5/9/2022 1304  Last data filed at 5/9/2022 9545  Gross per 24 hour   Intake 50 ml   Output 725 ml   Net -675 ml      Wt Readings from Last 3 Encounters:   05/09/22 266 lb (120.7 kg)   05/01/22 (!) 309 lb 8 oz (140.4 kg)   04/26/22 291 lb (132 kg)     General:  Asleep, awakens easily, in NAD  Skin:  Warm and dry. Neck:  Supple. No JVD appreciated  Chest:  Normal effort. Diminished, no wheezes/rhonchi/rales, tracheostomy  Cardiovascular:  RRR, normal S1/S2, no murmur/gallop/rub  Abdomen:  Distended, mild tenderness upper quadrants on palpation, + bowel sounds, NGT with bile colored drainage  Extremities:  1+ peripheral edema  Neurological: Moves all extremities  Psychological: Normal mood and affect      Labs and Tests:  CBC:   Recent Labs     05/07/22  1300 05/09/22  0457   WBC 10.3 10.6   HGB 8.0* 8.8*    389     BMP:    Recent Labs     05/07/22  1300 05/09/22  0458    139   K 4.2 5.2*   CL 94* 97*   CO2 28 21   BUN 41* 58*   CREATININE 4.5* 6.5*   GLUCOSE 114* 94     Hepatic:   No results for input(s): AST, ALT, ALB, BILITOT, ALKPHOS in the last 72 hours. [id number] as of 5/9/2022 17:51   Ref.  Range 5/8/2022 23:12 5/9/2022 03:18 5/9/2022 08:06 5/9/2022 11:18 5/9/2022 15:54 POC Glucose Latest Ref Range: 70 - 99 mg/dl 94 92 87 97 96       CT Abdomen/Pelvis 5/6/2022:  1. There is a partial small bowel obstruction, with a discrete transition   point noted adjacent to hernia mesh within the anterior lower abdomen.  Just   proximal to the transition point, a small bowel feces sign is seen related to   delayed transit.  This is seen on and around axial image 136-144.   2. Mild to moderate stool volume noted with rectosigmoid colonic distention. No functional obstruction related to this however. 3. Several renal cysts are seen bilaterally, many of which appearing normal   in density.  However there are some more hyperdense lesions, including a 3.6   cm left lower pole lesion measuring 40 Hounsfield units.  These would be best   characterized with dedicated MRI of the kidneys per renal mass protocol to   exclude the possibility of a renal neoplasm. 4. Masslike appearance within the uterus suggestive of underlying uterine   leiomyomas, as noted on recent CT imaging of the pelvis on 03/03/2022. Xray Abdomen 5/6/2022:  NG tube courses into the stomach but can be advanced by 5 cm for better   positioning.       Gas distended small bowel loops in the upper abdomen. Xray Abdomen 5/7/2022:  Persistent dilated small bowel loops consistent with a mid small bowel   obstruction, likely partial or intermittent. Problem List  Principal Problem:    SBO (small bowel obstruction) ([facility])  Active Problems:    ESRD on dialysis ([address]    Chronic respiratory failure requiring continuous mechanical ventilation through tracheostomy ([address]    History of CVA (cerebrovascular accident)    Anemia, chronic disease    Chronic diastolic heart failure ([address]    Essential hypertension    GERD (gastroesophageal reflux disease)    Morbid obesity with BMI of 50.0-59.9, adult ([facility])    SILVER (obstructive sleep apnea)  Resolved Problems:    * No resolved hospital problems. *       Assessment & Plan:   1. SBO. Presents with abdominal pain, n/v. CT scan revealed partial small bowel obstruction. NGT placed. Evaluated by surgery, combination of mechanical and functional issues. Reports improvement compared to admission. Continue NG decompression, may clamp for po meds. Small bowel follow through ordered for today. Continue IV fluids. 2. ESRD on HD. Management per nephrology. Dialysis days T-Th-Sat  3. Chronic respiratory failure / tracheostomy. O2 sats stable, % on 8 liters per trach mask. 4. DM2, controlled. A1c 5.4. Takes Lantus at facility. Being covered with NPO low dose correction. BG values controlled. 5. Normocytic anemia. Hgb 7.9 on presentation compared to 8.0 on DC 5/1. Remains stable. Continue to follow. 6. Vaginal bleed. Continued vaginal bleeding. This was issue during prior admission. CT abd/pelvis with masslike appearance suggestive of uterine leiomyomas. Evaluated by GYN, suspect bleeding from fibroids, patient not cooperative with exam. GYN following. 7. Renal cysts. CT scan shows bilateral renal cysts, some are hyperdense. Recommend dedicated MRI of the kidneys per renal mass protocol to exclude possibility of renal neoplasm. 8. History DVT. Warfarin held on admission. Continue heparin for DVT prophylaxis, discussed with nursing. Monitor CBC. 9. PAF. Developed atrial fib overnight with -110s. Received metoprolol 5 mg x 1. Spontaneously converted back into sinus rhythm after several hours. Has history atrial per hx list. [id number] (CHF, HTN, age, DM, CVA). Takes warfarin for hx DVT, currently on hold for anemia and vaginal bleeding. EP on board. Continue telemetry.       Diet: Diet NPO Exceptions are: Ice Chips, Sips of Water with Meds, Sips of Clear Liquids, Popsicles  Code:Full Code  DVT PPX: heparin      Suki Tidwell, APRN - CNP   5/9/2022 1:04 PM

## 2022-05-10 ENCOUNTER — APPOINTMENT (OUTPATIENT)
Dept: GENERAL RADIOLOGY | Age: 72
DRG: 335 | End: 2022-05-10
Payer: COMMERCIAL

## 2022-05-10 ENCOUNTER — ANESTHESIA EVENT (OUTPATIENT)
Dept: OPERATING ROOM | Age: 72
DRG: 335 | End: 2022-05-10
Payer: COMMERCIAL

## 2022-05-10 ENCOUNTER — ANESTHESIA (OUTPATIENT)
Dept: OPERATING ROOM | Age: 72
DRG: 335 | End: 2022-05-10
Payer: COMMERCIAL

## 2022-05-10 VITALS
RESPIRATION RATE: 10 BRPM | OXYGEN SATURATION: 100 % | SYSTOLIC BLOOD PRESSURE: 149 MMHG | DIASTOLIC BLOOD PRESSURE: 78 MMHG | TEMPERATURE: 98.1 F

## 2022-05-10 LAB
ANION GAP SERPL CALCULATED.3IONS-SCNC: 21 MMOL/L (ref 3–16)
BASE EXCESS ARTERIAL: -1 (ref -3–3)
BUN BLDV-MCNC: 74 MG/DL (ref 7–20)
CALCIUM SERPL-MCNC: 9.1 MG/DL (ref 8.3–10.6)
CHLORIDE BLD-SCNC: 94 MMOL/L (ref 99–110)
CO2: 26 MMOL/L (ref 21–32)
CREAT SERPL-MCNC: 7.4 MG/DL (ref 0.6–1.2)
GFR AFRICAN AMERICAN: 7
GFR NON-AFRICAN AMERICAN: 5
GLUCOSE BLD-MCNC: 100 MG/DL (ref 70–99)
GLUCOSE BLD-MCNC: 101 MG/DL (ref 70–99)
GLUCOSE BLD-MCNC: 102 MG/DL (ref 70–99)
GLUCOSE BLD-MCNC: 105 MG/DL (ref 70–99)
GLUCOSE BLD-MCNC: 124 MG/DL (ref 70–99)
HCO3 ARTERIAL: 25.9 MMOL/L (ref 21–29)
HCT VFR BLD CALC: 25.4 % (ref 36–48)
HEMOGLOBIN: 7.9 G/DL (ref 12–16)
MCH RBC QN AUTO: 30.1 PG (ref 26–34)
MCHC RBC AUTO-ENTMCNC: 31.3 G/DL (ref 31–36)
MCV RBC AUTO: 96 FL (ref 80–100)
O2 SAT, ARTERIAL: 100 % (ref 93–100)
PCO2 ARTERIAL: 54.3 MM HG (ref 35–45)
PDW BLD-RTO: 19 % (ref 12.4–15.4)
PERFORMED ON: ABNORMAL
PH ARTERIAL: 7.29 (ref 7.35–7.45)
PLATELET # BLD: 363 K/UL (ref 135–450)
PMV BLD AUTO: 7.8 FL (ref 5–10.5)
PO2 ARTERIAL: 457.8 MM HG (ref 75–108)
POC SAMPLE TYPE: ABNORMAL
POTASSIUM SERPL-SCNC: 4.8 MMOL/L (ref 3.5–5.1)
RBC # BLD: 2.64 M/UL (ref 4–5.2)
SODIUM BLD-SCNC: 141 MMOL/L (ref 136–145)
TCO2 ARTERIAL: 28 MMOL/L
WBC # BLD: 7.1 K/UL (ref 4–11)

## 2022-05-10 PROCEDURE — 2580000003 HC RX 258: Performed by: SURGERY

## 2022-05-10 PROCEDURE — A4217 STERILE WATER/SALINE, 500 ML: HCPCS | Performed by: SURGERY

## 2022-05-10 PROCEDURE — 2580000003 HC RX 258: Performed by: NURSE ANESTHETIST, CERTIFIED REGISTERED

## 2022-05-10 PROCEDURE — 6370000000 HC RX 637 (ALT 250 FOR IP): Performed by: SURGERY

## 2022-05-10 PROCEDURE — 94002 VENT MGMT INPAT INIT DAY: CPT

## 2022-05-10 PROCEDURE — 6360000002 HC RX W HCPCS: Performed by: NURSE ANESTHETIST, CERTIFIED REGISTERED

## 2022-05-10 PROCEDURE — 36415 COLL VENOUS BLD VENIPUNCTURE: CPT

## 2022-05-10 PROCEDURE — 2720000010 HC SURG SUPPLY STERILE: Performed by: SURGERY

## 2022-05-10 PROCEDURE — 82803 BLOOD GASES ANY COMBINATION: CPT

## 2022-05-10 PROCEDURE — 2580000003 HC RX 258: Performed by: ANESTHESIOLOGY

## 2022-05-10 PROCEDURE — 71045 X-RAY EXAM CHEST 1 VIEW: CPT

## 2022-05-10 PROCEDURE — 2000000000 HC ICU R&B

## 2022-05-10 PROCEDURE — 6360000002 HC RX W HCPCS: Performed by: SURGERY

## 2022-05-10 PROCEDURE — 44005 FREEING OF BOWEL ADHESION: CPT | Performed by: SURGERY

## 2022-05-10 PROCEDURE — 3700000000 HC ANESTHESIA ATTENDED CARE: Performed by: SURGERY

## 2022-05-10 PROCEDURE — C9290 INJ, BUPIVACAINE LIPOSOME: HCPCS | Performed by: SURGERY

## 2022-05-10 PROCEDURE — 2500000003 HC RX 250 WO HCPCS: Performed by: NURSE PRACTITIONER

## 2022-05-10 PROCEDURE — C9113 INJ PANTOPRAZOLE SODIUM, VIA: HCPCS | Performed by: NURSE PRACTITIONER

## 2022-05-10 PROCEDURE — 3600000004 HC SURGERY LEVEL 4 BASE: Performed by: SURGERY

## 2022-05-10 PROCEDURE — 2700000000 HC OXYGEN THERAPY PER DAY

## 2022-05-10 PROCEDURE — APPSS15 APP SPLIT SHARED TIME 0-15 MINUTES: Performed by: NURSE PRACTITIONER

## 2022-05-10 PROCEDURE — 2500000003 HC RX 250 WO HCPCS: Performed by: SURGERY

## 2022-05-10 PROCEDURE — 6360000002 HC RX W HCPCS: Performed by: INTERNAL MEDICINE

## 2022-05-10 PROCEDURE — 2709999900 HC NON-CHARGEABLE SUPPLY: Performed by: SURGERY

## 2022-05-10 PROCEDURE — P9045 ALBUMIN (HUMAN), 5%, 250 ML: HCPCS | Performed by: NURSE ANESTHETIST, CERTIFIED REGISTERED

## 2022-05-10 PROCEDURE — 94761 N-INVAS EAR/PLS OXIMETRY MLT: CPT

## 2022-05-10 PROCEDURE — 6360000002 HC RX W HCPCS: Performed by: NURSE PRACTITIONER

## 2022-05-10 PROCEDURE — 80048 BASIC METABOLIC PNL TOTAL CA: CPT

## 2022-05-10 PROCEDURE — 99232 SBSQ HOSP IP/OBS MODERATE 35: CPT | Performed by: SURGERY

## 2022-05-10 PROCEDURE — 2580000003 HC RX 258: Performed by: INTERNAL MEDICINE

## 2022-05-10 PROCEDURE — 5A1945Z RESPIRATORY VENTILATION, 24-96 CONSECUTIVE HOURS: ICD-10-PCS | Performed by: INTERNAL MEDICINE

## 2022-05-10 PROCEDURE — 3600000014 HC SURGERY LEVEL 4 ADDTL 15MIN: Performed by: SURGERY

## 2022-05-10 PROCEDURE — 6360000002 HC RX W HCPCS: Performed by: ANESTHESIOLOGY

## 2022-05-10 PROCEDURE — APPNB30 APP NON BILLABLE TIME 0-30 MINS: Performed by: NURSE PRACTITIONER

## 2022-05-10 PROCEDURE — 6360000002 HC RX W HCPCS: Performed by: HOSPITALIST

## 2022-05-10 PROCEDURE — 85027 COMPLETE CBC AUTOMATED: CPT

## 2022-05-10 PROCEDURE — 2580000003 HC RX 258: Performed by: HOSPITALIST

## 2022-05-10 PROCEDURE — 0DNE0ZZ RELEASE LARGE INTESTINE, OPEN APPROACH: ICD-10-PCS | Performed by: SURGERY

## 2022-05-10 PROCEDURE — 02HV33Z INSERTION OF INFUSION DEVICE INTO SUPERIOR VENA CAVA, PERCUTANEOUS APPROACH: ICD-10-PCS | Performed by: ANESTHESIOLOGY

## 2022-05-10 PROCEDURE — 3700000001 HC ADD 15 MINUTES (ANESTHESIA): Performed by: SURGERY

## 2022-05-10 PROCEDURE — 90935 HEMODIALYSIS ONE EVALUATION: CPT

## 2022-05-10 PROCEDURE — 2500000003 HC RX 250 WO HCPCS: Performed by: NURSE ANESTHETIST, CERTIFIED REGISTERED

## 2022-05-10 RX ORDER — FENTANYL CITRATE 50 UG/ML
INJECTION, SOLUTION INTRAMUSCULAR; INTRAVENOUS PRN
Status: DISCONTINUED | OUTPATIENT
Start: 2022-05-10 | End: 2022-05-10 | Stop reason: SDUPTHER

## 2022-05-10 RX ORDER — CIPROFLOXACIN 2 MG/ML
400 INJECTION, SOLUTION INTRAVENOUS
Status: COMPLETED | OUTPATIENT
Start: 2022-05-10 | End: 2022-05-10

## 2022-05-10 RX ORDER — METRONIDAZOLE 500 MG/100ML
500 INJECTION, SOLUTION INTRAVENOUS EVERY 8 HOURS
Status: COMPLETED | OUTPATIENT
Start: 2022-05-11 | End: 2022-05-11

## 2022-05-10 RX ORDER — METRONIDAZOLE 500 MG/100ML
500 INJECTION, SOLUTION INTRAVENOUS
Status: COMPLETED | OUTPATIENT
Start: 2022-05-10 | End: 2022-05-10

## 2022-05-10 RX ORDER — SODIUM CHLORIDE 9 MG/ML
INJECTION, SOLUTION INTRAVENOUS CONTINUOUS PRN
Status: DISCONTINUED | OUTPATIENT
Start: 2022-05-10 | End: 2022-05-10 | Stop reason: SDUPTHER

## 2022-05-10 RX ORDER — MORPHINE SULFATE 2 MG/ML
2 INJECTION, SOLUTION INTRAMUSCULAR; INTRAVENOUS
Status: DISCONTINUED | OUTPATIENT
Start: 2022-05-10 | End: 2022-05-19

## 2022-05-10 RX ORDER — PHENYLEPHRINE HYDROCHLORIDE 10 MG/ML
INJECTION INTRAVENOUS PRN
Status: DISCONTINUED | OUTPATIENT
Start: 2022-05-10 | End: 2022-05-10 | Stop reason: SDUPTHER

## 2022-05-10 RX ORDER — CHLORHEXIDINE GLUCONATE 0.12 MG/ML
15 RINSE ORAL 2 TIMES DAILY
Status: DISCONTINUED | OUTPATIENT
Start: 2022-05-10 | End: 2022-05-11

## 2022-05-10 RX ORDER — LIDOCAINE HYDROCHLORIDE 20 MG/ML
INJECTION, SOLUTION EPIDURAL; INFILTRATION; INTRACAUDAL; PERINEURAL PRN
Status: DISCONTINUED | OUTPATIENT
Start: 2022-05-10 | End: 2022-05-10 | Stop reason: SDUPTHER

## 2022-05-10 RX ORDER — PROPOFOL 10 MG/ML
INJECTION, EMULSION INTRAVENOUS PRN
Status: DISCONTINUED | OUTPATIENT
Start: 2022-05-10 | End: 2022-05-10 | Stop reason: SDUPTHER

## 2022-05-10 RX ORDER — FAMOTIDINE 10 MG/ML
20 INJECTION, SOLUTION INTRAVENOUS DAILY
Status: DISCONTINUED | OUTPATIENT
Start: 2022-05-10 | End: 2022-05-13

## 2022-05-10 RX ORDER — VECURONIUM BROMIDE 1 MG/ML
INJECTION, POWDER, LYOPHILIZED, FOR SOLUTION INTRAVENOUS PRN
Status: DISCONTINUED | OUTPATIENT
Start: 2022-05-10 | End: 2022-05-10 | Stop reason: SDUPTHER

## 2022-05-10 RX ORDER — MORPHINE SULFATE 4 MG/ML
4 INJECTION, SOLUTION INTRAMUSCULAR; INTRAVENOUS
Status: DISCONTINUED | OUTPATIENT
Start: 2022-05-10 | End: 2022-05-19

## 2022-05-10 RX ORDER — METRONIDAZOLE 500 MG/100ML
500 INJECTION, SOLUTION INTRAVENOUS
Status: DISCONTINUED | OUTPATIENT
Start: 2022-05-10 | End: 2022-05-10 | Stop reason: SDUPTHER

## 2022-05-10 RX ORDER — METOCLOPRAMIDE HYDROCHLORIDE 5 MG/ML
3.3 INJECTION INTRAMUSCULAR; INTRAVENOUS EVERY 6 HOURS
Status: COMPLETED | OUTPATIENT
Start: 2022-05-10 | End: 2022-05-11

## 2022-05-10 RX ORDER — FENTANYL CITRATE-0.9 % NACL/PF 10 MCG/ML
25-200 PLASTIC BAG, INJECTION (ML) INTRAVENOUS CONTINUOUS
Status: DISCONTINUED | OUTPATIENT
Start: 2022-05-10 | End: 2022-05-12

## 2022-05-10 RX ORDER — CIPROFLOXACIN 2 MG/ML
400 INJECTION, SOLUTION INTRAVENOUS ONCE
Status: COMPLETED | OUTPATIENT
Start: 2022-05-11 | End: 2022-05-11

## 2022-05-10 RX ORDER — CIPROFLOXACIN 2 MG/ML
400 INJECTION, SOLUTION INTRAVENOUS
Status: DISCONTINUED | OUTPATIENT
Start: 2022-05-10 | End: 2022-05-10 | Stop reason: SDUPTHER

## 2022-05-10 RX ORDER — ALBUMIN, HUMAN INJ 5% 5 %
SOLUTION INTRAVENOUS
Status: DISPENSED
Start: 2022-05-10 | End: 2022-05-11

## 2022-05-10 RX ORDER — PROPOFOL 10 MG/ML
5-80 INJECTION, EMULSION INTRAVENOUS CONTINUOUS
Status: DISCONTINUED | OUTPATIENT
Start: 2022-05-10 | End: 2022-05-12

## 2022-05-10 RX ORDER — MAGNESIUM HYDROXIDE 1200 MG/15ML
LIQUID ORAL CONTINUOUS PRN
Status: COMPLETED | OUTPATIENT
Start: 2022-05-10 | End: 2022-05-10

## 2022-05-10 RX ORDER — ALBUMIN, HUMAN INJ 5% 5 %
SOLUTION INTRAVENOUS PRN
Status: DISCONTINUED | OUTPATIENT
Start: 2022-05-10 | End: 2022-05-10 | Stop reason: SDUPTHER

## 2022-05-10 RX ADMIN — VECURONIUM BROMIDE 5 MG: 1 INJECTION, POWDER, LYOPHILIZED, FOR SOLUTION INTRAVENOUS at 18:08

## 2022-05-10 RX ADMIN — SODIUM CHLORIDE, PRESERVATIVE FREE 20 ML: 5 INJECTION INTRAVENOUS at 02:41

## 2022-05-10 RX ADMIN — Medication 10 ML: at 20:18

## 2022-05-10 RX ADMIN — PROPOFOL 50 MG: 10 INJECTION, EMULSION INTRAVENOUS at 17:52

## 2022-05-10 RX ADMIN — Medication 10 ML: at 20:13

## 2022-05-10 RX ADMIN — SODIUM CHLORIDE: 9 INJECTION, SOLUTION INTRAVENOUS at 20:09

## 2022-05-10 RX ADMIN — CIPROFLOXACIN 400 MG: 2 INJECTION, SOLUTION INTRAVENOUS at 17:45

## 2022-05-10 RX ADMIN — DIPHENHYDRAMINE HYDROCHLORIDE 25 MG: 50 INJECTION, SOLUTION INTRAMUSCULAR; INTRAVENOUS at 06:01

## 2022-05-10 RX ADMIN — PHENYLEPHRINE HYDROCHLORIDE 100 MCG: 10 INJECTION INTRAVENOUS at 17:53

## 2022-05-10 RX ADMIN — Medication 10 ML: at 09:21

## 2022-05-10 RX ADMIN — EPOETIN ALFA-EPBX 10000 UNITS: 10000 INJECTION, SOLUTION INTRAVENOUS; SUBCUTANEOUS at 13:34

## 2022-05-10 RX ADMIN — SODIUM CHLORIDE, PRESERVATIVE FREE 20 ML: 5 INJECTION INTRAVENOUS at 06:01

## 2022-05-10 RX ADMIN — HYDROMORPHONE HYDROCHLORIDE 0.5 MG: 1 INJECTION, SOLUTION INTRAMUSCULAR; INTRAVENOUS; SUBCUTANEOUS at 06:00

## 2022-05-10 RX ADMIN — SODIUM CHLORIDE: 9 INJECTION, SOLUTION INTRAVENOUS at 17:42

## 2022-05-10 RX ADMIN — LIDOCAINE HYDROCHLORIDE 100 MG: 20 INJECTION, SOLUTION EPIDURAL; INFILTRATION; INTRACAUDAL; PERINEURAL at 17:43

## 2022-05-10 RX ADMIN — PHENYLEPHRINE HYDROCHLORIDE 20 MCG/MIN: 10 INJECTION INTRAVENOUS at 22:42

## 2022-05-10 RX ADMIN — HEPARIN SODIUM 5000 UNITS: 5000 INJECTION INTRAVENOUS; SUBCUTANEOUS at 22:20

## 2022-05-10 RX ADMIN — VECURONIUM BROMIDE 10 MG: 1 INJECTION, POWDER, LYOPHILIZED, FOR SOLUTION INTRAVENOUS at 18:24

## 2022-05-10 RX ADMIN — HYDROMORPHONE HYDROCHLORIDE 0.5 MG: 1 INJECTION, SOLUTION INTRAMUSCULAR; INTRAVENOUS; SUBCUTANEOUS at 02:41

## 2022-05-10 RX ADMIN — ALBUMIN (HUMAN) 250 ML: 12.5 INJECTION, SOLUTION INTRAVENOUS at 17:59

## 2022-05-10 RX ADMIN — PHENYLEPHRINE HYDROCHLORIDE 100 MCG: 10 INJECTION INTRAVENOUS at 17:59

## 2022-05-10 RX ADMIN — FENTANYL CITRATE 50 MCG: 50 INJECTION, SOLUTION INTRAMUSCULAR; INTRAVENOUS at 18:06

## 2022-05-10 RX ADMIN — CHLORHEXIDINE GLUCONATE 15 ML: 1.2 RINSE ORAL at 20:27

## 2022-05-10 RX ADMIN — PHENYLEPHRINE HYDROCHLORIDE 25 MCG/MIN: 10 INJECTION INTRAVENOUS at 17:47

## 2022-05-10 RX ADMIN — PANTOPRAZOLE SODIUM 20 MG: 40 INJECTION, POWDER, FOR SOLUTION INTRAVENOUS at 09:19

## 2022-05-10 RX ADMIN — METOCLOPRAMIDE 3.5 MG: 5 INJECTION, SOLUTION INTRAMUSCULAR; INTRAVENOUS at 20:20

## 2022-05-10 RX ADMIN — Medication 10 ML: at 09:23

## 2022-05-10 RX ADMIN — ALBUMIN (HUMAN) 250 ML: 12.5 INJECTION, SOLUTION INTRAVENOUS at 18:23

## 2022-05-10 RX ADMIN — HEPARIN SODIUM 5000 UNITS: 5000 INJECTION INTRAVENOUS; SUBCUTANEOUS at 09:20

## 2022-05-10 RX ADMIN — PHENYLEPHRINE HYDROCHLORIDE 100 MCG: 10 INJECTION INTRAVENOUS at 17:43

## 2022-05-10 RX ADMIN — ALBUMIN (HUMAN) 250 ML: 12.5 INJECTION, SOLUTION INTRAVENOUS at 18:11

## 2022-05-10 RX ADMIN — ONDANSETRON 4 MG: 2 INJECTION INTRAMUSCULAR; INTRAVENOUS at 02:41

## 2022-05-10 RX ADMIN — SODIUM CHLORIDE: 9 INJECTION, SOLUTION INTRAVENOUS at 16:44

## 2022-05-10 RX ADMIN — PROPOFOL 10 MCG/KG/MIN: 10 INJECTION, EMULSION INTRAVENOUS at 20:11

## 2022-05-10 RX ADMIN — FAMOTIDINE 20 MG: 10 INJECTION, SOLUTION INTRAVENOUS at 20:25

## 2022-05-10 RX ADMIN — METRONIDAZOLE 500 MG: 500 INJECTION, SOLUTION INTRAVENOUS at 17:42

## 2022-05-10 RX ADMIN — Medication 50 MCG/HR: at 20:17

## 2022-05-10 RX ADMIN — FENTANYL CITRATE 50 MCG: 50 INJECTION, SOLUTION INTRAMUSCULAR; INTRAVENOUS at 18:25

## 2022-05-10 RX ADMIN — HYDROMORPHONE HYDROCHLORIDE 0.5 MG: 1 INJECTION, SOLUTION INTRAMUSCULAR; INTRAVENOUS; SUBCUTANEOUS at 09:20

## 2022-05-10 RX ADMIN — VECURONIUM BROMIDE 5 MG: 1 INJECTION, POWDER, LYOPHILIZED, FOR SOLUTION INTRAVENOUS at 17:52

## 2022-05-10 ASSESSMENT — PULMONARY FUNCTION TESTS
PIF_VALUE: 34
PIF_VALUE: 34
PIF_VALUE: 29
PIF_VALUE: 38
PIF_VALUE: 0
PIF_VALUE: 25
PIF_VALUE: 31
PIF_VALUE: 32
PIF_VALUE: 35
PIF_VALUE: 29
PIF_VALUE: 30
PIF_VALUE: 31
PIF_VALUE: 35
PIF_VALUE: 31
PIF_VALUE: 40
PIF_VALUE: 31
PIF_VALUE: 25
PIF_VALUE: 26
PIF_VALUE: 36
PIF_VALUE: 24
PIF_VALUE: 32
PIF_VALUE: 29
PIF_VALUE: 2
PIF_VALUE: 32
PIF_VALUE: 0
PIF_VALUE: 35
PIF_VALUE: 25
PIF_VALUE: 25
PIF_VALUE: 28
PIF_VALUE: 31
PIF_VALUE: 35
PIF_VALUE: 31
PIF_VALUE: 24
PIF_VALUE: 35
PIF_VALUE: 31
PIF_VALUE: 35
PIF_VALUE: 19
PIF_VALUE: 31
PIF_VALUE: 35
PIF_VALUE: 28
PIF_VALUE: 36
PIF_VALUE: 32
PIF_VALUE: 29
PIF_VALUE: 30
PIF_VALUE: 35
PIF_VALUE: 33
PIF_VALUE: 32
PIF_VALUE: 30
PIF_VALUE: 36
PIF_VALUE: 27
PIF_VALUE: 35
PIF_VALUE: 29
PIF_VALUE: 32
PIF_VALUE: 32
PIF_VALUE: 28
PIF_VALUE: 33
PIF_VALUE: 26
PIF_VALUE: 29
PIF_VALUE: 35
PIF_VALUE: 32
PIF_VALUE: 37
PIF_VALUE: 29
PIF_VALUE: 36
PIF_VALUE: 29
PIF_VALUE: 35
PIF_VALUE: 11
PIF_VALUE: 36

## 2022-05-10 ASSESSMENT — PAIN - FUNCTIONAL ASSESSMENT: PAIN_FUNCTIONAL_ASSESSMENT: PREVENTS OR INTERFERES SOME ACTIVE ACTIVITIES AND ADLS

## 2022-05-10 ASSESSMENT — PAIN DESCRIPTION - LOCATION: LOCATION: ABDOMEN

## 2022-05-10 ASSESSMENT — PAIN SCALES - GENERAL
PAINLEVEL_OUTOF10: 10

## 2022-05-10 ASSESSMENT — PAIN DESCRIPTION - ORIENTATION: ORIENTATION: ANTERIOR

## 2022-05-10 NOTE — PLAN OF CARE
Pt complains of abdominal pain rate 10 on scale 0-10 described as continual cramping and nausea. PRN medications given. Pt uses call light to request frequent position changes. This RN spoke with radiology and was informed that the Small Bowel Follow Through exam is not yet complete. They will take another film after 0600 to determine if contrast has worked through the small intestine. Pt does have additional scratch marks on her arm from itching. Pt encourage not to itch, lotion applied & PRN medication given. Mepilex was applied to bleeding scratches. Increased frequency of rounds continue to reduce pt anxiety and to promote comfort & safety. Vaginal bleeding continues. See flowsheets for details and vitals. Problem: Discharge Planning  Goal: Discharge to home or other facility with appropriate resources  Outcome: Progressing     Problem: Pain  Goal: Verbalizes/displays adequate comfort level or baseline comfort level  Outcome: Progressing     Problem: Skin/Tissue Integrity  Goal: Absence of new skin breakdown  Description: 1. Monitor for areas of redness and/or skin breakdown  2. Assess vascular access sites hourly  3. Every 4-6 hours minimum:  Change oxygen saturation probe site  4. Every 4-6 hours:  If on nasal continuous positive airway pressure, respiratory therapy assess nares and determine need for appliance change or resting period.   Outcome: Progressing     Problem: Safety - Adult  Goal: Free from fall injury  Outcome: Progressing     Problem: ABCDS Injury Assessment  Goal: Absence of physical injury  Outcome: Progressing     Problem: Neurosensory - Adult  Goal: Achieves maximal functionality and self care  Outcome: Progressing     Problem: Respiratory - Adult  Goal: Achieves optimal ventilation and oxygenation  Outcome: Progressing     Problem: Cardiovascular - Adult  Goal: Maintains optimal cardiac output and hemodynamic stability  Outcome: Progressing  Goal: Absence of cardiac dysrhythmias or at baseline  Outcome: Progressing     Problem: Skin/Tissue Integrity - Adult  Goal: Skin integrity remains intact  Outcome: Progressing  Flowsheets (Taken 5/9/2022 1920)  Skin Integrity Remains Intact: Monitor for areas of redness and/or skin breakdown     Problem: Musculoskeletal - Adult  Goal: Return mobility to safest level of function  Outcome: Progressing     Problem: Gastrointestinal - Adult  Goal: Minimal or absence of nausea and vomiting  Outcome: Progressing  Goal: Maintains or returns to baseline bowel function  Outcome: Progressing  Goal: Maintains adequate nutritional intake  Outcome: Progressing     Problem: Genitourinary - Adult  Goal: Urinary catheter remains patent  Outcome: Progressing     Problem: Infection - Adult  Goal: Absence of infection at discharge  Outcome: Progressing     Problem: Metabolic/Fluid and Electrolytes - Adult  Goal: Electrolytes maintained within normal limits  Outcome: Progressing  Goal: Hemodynamic stability and optimal renal function maintained  Outcome: Progressing  Goal: Glucose maintained within prescribed range  Outcome: Progressing     Problem: Hematologic - Adult  Goal: Maintains hematologic stability  Outcome: Progressing     Problem: Nutrition Deficit:  Goal: Optimize nutritional status  5/10/2022 0259 by Amira Suárez RN  Outcome: Progressing  5/9/2022 1335 by Seth Riggs RD, LD  Flowsheets (Taken 5/9/2022 1335)  Nutrient intake appropriate for improving, restoring, or maintaining nutritional needs: Monitor oral intake, labs, and treatment plans

## 2022-05-10 NOTE — PROGRESS NOTES
Pt continues to have anxiety and complains about abdominal pain. Pt benefits from increased frequency of rounds and having RN or other at bedside to ease anxiety. Pt remains able to use call light to request assistance. Pt was reminded during shift that yelling out is not appropriate and disruptive to other patients. Pt understood and agreed to use call light. Increased frequency of rounds continue to support pt's emotional needs and safety concerns. Standard safety precautions remain in place; specialty mattress utilized. Pt expressed desire die;  Emotional support & prayer given, pt satisfied.

## 2022-05-10 NOTE — PROGRESS NOTES
Pt had episode of large emesis (yellow liquid) at 2140 requiring complete linen, gown, and trach oxygen mask change. Pt previously given Zofran for nausea and pain medication for abdominal pain. Pt remains NPO for small bowel follow through exam.  Pt expressed relief from pain & nausea after emesis;  NG tube remains clamped until completion of small bowel follow through examination. Pt currently sleeping; call light within reach; standard safety precautions remain in place.

## 2022-05-10 NOTE — ANESTHESIA PROCEDURE NOTES
Central Venous Line:    A central venous line was placed using surface landmarks, in the pre-op for the following indication(s): central venous access and CVP monitoring. 5/10/2022 4:55 PM5/10/2022 5:05 PM    Sterility preparation included the following: hand hygiene performed prior to procedure, maximum sterile barriers used and sterile technique used to drape from head to toe. The patient was placed in Trendelenburg position. The left internal jugular vein was prepped. The site was prepped with Chloraprep. A 7 Fr (size), triple lumen was placed. During the procedure, the following specific steps were taken: target vein identified, needle advanced into vein and blood aspirated and guidewire advanced into vein. Intravenous verification was obtained by ultrasound and venous blood return. Post insertion care included: all ports aspirated, all ports flushed easily, guidewire removed intact, Biopatch applied, line sutured in place and dressing applied. During the procedure the patient experienced: patient tolerated procedure well with no complications and EBL < 5mL. Outcomes: uncomplicated and patient tolerated procedure well  Anesthesia type: general.. No    Additional notes:  U/S [id number].  (1) US was used to identify the  vessel. (2) It was assessed and patent. (3) US was used to visualize vascular needle entry into the  vessel.  (4) The selected vessel appeared anatomically normal and (5) there were no apparent abnormal findings. (6) A permanent ultrasound image was saved in the patient's record.       Staffing  Anesthesiologist: Guilherme Montoya MD  Preanesthetic Checklist  Completed: patient identified, IV checked, site marked, risks and benefits discussed, surgical consent, monitors and equipment checked, pre-op evaluation, timeout performed, anesthesia consent given, oxygen available and patient being monitored

## 2022-05-10 NOTE — PLAN OF CARE
Problem: Safety - Adult  Goal: Free from fall injury  5/10/2022 1104 by Analilia aSntos RN  Outcome: Progressing  Note: Patient is a high fall risk. Patient free of falls this shift. Bed low, locked and alarmed at all times. Call light and bedside table is within reach. Orange blanket on bed, arm band on wrist and fall sign posted in the room. Notified patient to ask for assistance when needed. Patient verbalized understanding.     5/10/2022 1104 by Analilia Santos RN  Outcome: Not Progressing  5/10/2022 0259 by Nehemias Ryan RN  Outcome: Progressing

## 2022-05-10 NOTE — PROGRESS NOTES
[facility]        Progress Note    Pt Name: Romana Dancer  MRN: [id number]  [place]: 1950  Date of evaluation: 5/10/2022    Chief Complaint: Abdominal pain      Subjective:    Vomited overnight with NGT clamped for small bowel follow through study  Pain persists, worse with NGT clamped  No recent stool  Resting in bed at this time; seen in dialysis      Vital Signs:  Patient Vitals for the past 24 hrs:   BP Temp Temp src Pulse Resp SpO2 Height Weight   05/10/22 0925 -- -- -- -- -- 99 % -- --   05/10/22 0713 (!) 141/72 98.3 °F (36.8 °C) Oral 82 18 100 % -- --   05/10/22 0600 -- -- -- -- 18 -- -- 262 lb 3.2 oz (118.9 kg)   05/10/22 0311 -- -- -- -- 18 -- -- --   05/10/22 0250 (!) 153/68 98.2 °F (36.8 °C) Oral 79 18 98 % -- --   05/10/22 0241 -- -- -- -- 18 -- -- --   22 2352 135/83 98.5 °F (36.9 °C) Oral 118 18 -- -- --   22 2320 -- -- -- -- -- 96 % -- --   22 -- -- -- -- 18 -- -- --   22 1920 (!) 142/96 97.8 °F (36.6 °C) Oral 91 18 100 % -- --   22 1617 (!) 148/62 97.9 °F (36.6 °C) Oral 82 18 98 % -- --   22 1536 -- -- -- -- 16 98 % -- --   22 1530 (!) 173/65 98.3 °F (36.8 °C) Oral 86 18 100 % -- --   22 1322 -- -- -- -- -- -- 5' 4\" (1.626 m) --   22 1232 -- -- -- -- 16 96 % -- --      TEMPERATURE HISTORY 24H: Temp (24hrs), Av.2 °F (36.8 °C), Min:97.8 °F (36.6 °C), Max:98.5 °F (36.9 °C)    BLOOD PRESSURE HISTORY: Systolic (60LPF), VVR:149 , Min:114 , MRP:804    Diastolic (04MUA), URD:65, Min:59, Max:96      Intake/Output:    I/O last 3 completed shifts:  In: -   Out: 725 [Urine:100; Emesis/NG output:625]  No intake/output data recorded.   Drain/tube Output:           Physical Exam:  General: awake, alert, answers questions appropriately but converses minimally  Cardiac: regular rate and rhythm   Pulmonary: clear to auscultation bilaterally   Abdomen: soft, mild distension, mild upper/epigastric abdominal tenderness only, bowel sounds present    Labs:  CBC:    Recent Labs     05/07/22  1300 05/09/22  0457 05/10/22  0522   WBC 10.3 10.6 7.1   HGB 8.0* 8.8* 7.9*   HCT 25.1* 30.2* 25.4*    389 363     BMP:    Recent Labs     05/07/22  1300 05/09/22  0458 05/10/22  0522    139 141   K 4.2 5.2* 4.8   CL 94* 97* 94*   CO2 28 21 26   BUN 41* 58* 74*   CREATININE 4.5* 6.5* 7.4*   GLUCOSE 114* 94 102*     Hepatic:   No results for input(s): AST, ALT, ALB, BILITOT, ALKPHOS in the last 72 hours. Amylase: No results for input(s): AMYLASE in the last 72 hours. Lipase: No results for input(s): LIPASE in the last 72 hours. Mag:    No results for input(s): MG in the last 72 hours. Phos:     No results for input(s): PHOS in the last 72 hours. Coags: No results for input(s): INR, APTT in the last 72 hours. Cultures:  Relevant cultures documented under results section     Pathology:   No relevant pathology     Imaging:  I have personally reviewed the following films:    FL SMALL BOWEL FOLLOW THROUGH ONLY    Result Date: 5/10/2022  EXAMINATION: SMALL BOWEL FOLLOW THROUGH SERIES 5/9/2022 TECHNIQUE: Small bowel follow through series was performed with overhead images and spot images. FLUOROSCOPY DOSE AND TYPE OR TIME AND EXPOSURES: 0 COMPARISON: 05/07/2022 HISTORY: ORDERING SYSTEM PROVIDED HISTORY: water soluble contrast through NG, eval SBO TECHNOLOGIST PROVIDED HISTORY: Reason for exam:->water soluble contrast through NG, eval SBO Reason for Exam: eval SBO FINDINGS: There worsening dilated loops of small bowel throughout the abdomen likely due to a partial small bowel obstruction. The nasogastric tube terminates in gastric fundus. Degenerative changes involve the lumbar spine and bilateral hips. The bones are demineralized. The contrast never reaches the cecum even after 18 hours. 1. Worsening partial small bowel obstruction.      Scheduled Meds:   metoprolol tartrate  25 mg Oral BID    QUEtiapine  12.5 mg Oral Nightly    pantoprazole  20 mg IntraVENous Daily    sodium chloride flush  5-40 mL IntraVENous 2 times per day    sodium chloride flush  5-40 mL IntraVENous 2 times per day    heparin (porcine)  5,000 Units SubCUTAneous TID    insulin lispro  0-6 Units SubCUTAneous Q4H    heparin (porcine)  1,000 Units IntraVENous Once    epoetin claire-epbx  10,000 Units IntraVENous Once per day on Mon Wed Fri     Continuous Infusions:   sodium chloride      sodium chloride      dextrose       PRN Meds:.metoprolol, HYDROmorphone, LORazepam, sodium chloride, ondansetron **OR** ondansetron, acetaminophen **OR** acetaminophen, sodium chloride flush, sodium chloride, polyethylene glycol, glucose, glucagon (rDNA), dextrose, dextrose bolus **OR** dextrose bolus, diphenhydrAMINE      Assessment:  Partial small bowel obstruction  Fecal impaction      Plan:  1. Pain and distention persist, vomiting with NGT clamped for small bowel follow through overnight, no recent BM; no contrast to cecum after 18 hours into study, plans for exploratory laparotomy, lysis of adhesions, possible bowel resection this evening with Dr. De La Fuente  2. NPO, return NGT to continuous low wall suction; monitor bowel function  3. IV hydration; monitor and correct electrolytes  4. Perioperative antibiotics  5. Activity as tolerated  6. PRN analgesics and antiemetics--minimizing narcotics as tolerated  7. Management of medical comorbid etiologies per primary team and consulting services    EDUCATION:  Educated patient/daughter on plan of care and disease process--all questions answered. Plans discussed with patient/daughter and nursing. Reviewed and discussed with Dr. De La Fuente. Signed:  ITALO Bob CNP  5/10/2022 12:19 PM     Surgery Staff:     Pt seen and examined with NP  See full note above  Has persisting abd pain, tympany, distension.  SBFT and associated AXR reveal -persisting SBO  Will need surgery today after HD  Antibiotics ordered  Plans for surgery, R/B/A reviewed with pt and daughter.  High M/M risk acknowledged, and will proceed    Maame Palacio MD

## 2022-05-10 NOTE — PROGRESS NOTES
Trach care completed. Inner cannula replaced. Drain bag emptied. Sterile water replaced. Trach secure. Patient suctioned small, tan, and thick secretions.

## 2022-05-10 NOTE — ANESTHESIA PRE PROCEDURE
Department of Anesthesiology  Preprocedure Note       Name:  Fede Tejeda   Age:  80 y.o.  :  1950                                          MRN:  [id number]         Date:  5/10/2022      Surgeon: Simon Singh):  Corky Cohen MD    Procedure: Procedure(s):  EXPLORATORY LAPAROTOMY, LYSIS OF ADHESIONS, POSSIBLE SMALL BOWEL RESECTION    Medications prior to admission:   Prior to Admission medications    Medication Sig Start Date End Date Taking? Authorizing Provider   warfarin (COUMADIN) 3 MG tablet Take 1 tablet by mouth nightly 22   Serene Love MD   hydrocortisone (ANUSOL-HC) 2.5 % CREA rectal cream Place rectally 2 times daily 3/3/22   Dre Jaffe DO   metoprolol succinate (TOPROL XL) 25 MG extended release tablet Take 0.5 tablets by mouth daily 22   Peter Aguilera MD   insulin glargine (LANTUS) 100 UNIT/ML injection vial Inject 15 Units into the skin daily    Historical Provider, MD   aspirin 81 MG EC tablet Take 81 mg by mouth daily    Historical Provider, MD   cyanocobalamin 1000 MCG tablet Take 500 mcg by mouth daily    Historical Provider, MD   QUEtiapine (SEROQUEL) 25 MG tablet Take 12.5 mg by mouth nightly    Historical Provider, MD   sodium polystyrene (KAYEXALATE) 15 GM/60ML suspension Take 15 g by mouth 4 times daily Sun, Mon, Wed, Fri    Historical Provider, MD   calcium acetate 667 MG TABS Take 1,334 mg by mouth 3 times daily (with meals)    Historical Provider, MD   hydrocortisone (PREPARATION H) 1 % cream Apply topically 2 times daily Apply topically 2 times daily.     Historical Provider, MD   acetaminophen (TYLENOL) 325 MG tablet Take 650 mg by mouth every 6 hours as needed for Pain    Historical Provider, MD   atorvastatin (LIPITOR) 80 MG tablet Take 80 mg by mouth at bedtime    Historical Provider, MD   diphenhydrAMINE (BENADRYL) 25 MG capsule Take 25 mg by mouth every 8 hours as needed     Historical Provider, MD   docusate sodium (COLACE) 100 MG capsule Take 100 mg by mouth 2 times daily    Historical Provider, MD   lactulose encephalopathy 10 GM/15ML SOLN solution Take 20 g by mouth daily as needed     Historical Provider, MD   polyethylene glycol (GLYCOLAX) 17 g packet Take 17 g by mouth daily as needed for Constipation    Historical Provider, MD   insulin NPH (HUMULIN N;NOVOLIN N) 100 UNIT/ML injection vial Inject into the skin every 6 hours     Historical Provider, MD   ipratropium-albuterol (Bennetta Kyle) 0.5-2.5 (3) MG/3ML SOLN nebulizer solution Inhale 1 vial into the lungs every 4 hours    Historical Provider, MD   furosemide (LASIX) 20 MG tablet Take 20 mg by mouth three times a week [facility]    Historical Provider, MD   loperamide (IMODIUM) 2 MG capsule Take 2 mg by mouth 4 times daily as needed for Diarrhea    Historical Provider, MD   midodrine (PROAMATINE) 5 MG tablet Take 10 mg by mouth three times a week Tues thurs sat pre dialysis    Historical Provider, MD   dextromethorphan-guaiFENesin (MUCINEX DM)  MG per extended release tablet Take 1 tablet by mouth 2 times daily     Historical Provider, MD   omeprazole (PRILOSEC) 20 MG delayed release capsule Take 20 mg by mouth daily    Historical Provider, MD   chlorhexidine (PERIDEX) 0.12 % solution Take 15 mLs by mouth 2 times daily    Historical Provider, MD   sennosides-docusate sodium (SENOKOT-S) 8.6-50 MG tablet Take 2 tablets by mouth at bedtime     Historical Provider, MD   sertraline (ZOLOFT) 50 MG tablet Take 50 mg by mouth daily    Historical Provider, MD   terazosin (HYTRIN) 1 MG capsule Take 1 mg by mouth nightly    Historical Provider, MD   ondansetron (ZOFRAN) 4 MG tablet Take 4 mg by mouth every 8 hours as needed for Nausea or Vomiting    Historical Provider, MD       Current medications:    Current Facility-Administered Medications   Medication Dose Route Frequency Provider Last Rate Last Admin    epoetin claire-epbx (RETACRIT) injection 10,000 Units  10,000 Units IntraVENous Once per day on Tue [facility] Aydin Kumari MD        metoprolol tartrate (LOPRESSOR) tablet 25 mg  25 mg Oral BID Ludivina Ledesma, APRN - CNP   25 mg at 05/09/22 0924    QUEtiapine (SEROQUEL) tablet 12.5 mg  12.5 mg Oral Nightly Darby Hallie, APRN - CNP   12.5 mg at 05/08/22 2042    pantoprazole (PROTONIX) injection 20 mg  20 mg IntraVENous Daily Darby Hallie, APRN - CNP   20 mg at 05/10/22 0919    metoprolol (LOPRESSOR) injection 5 mg  5 mg IntraVENous Q6H PRN Redge Elm, APRN - CNP   5 mg at 05/08/22 1602    HYDROmorphone HCl PF (DILAUDID) injection 0.5 mg  0.5 mg IntraVENous Q3H PRN Darby Hallie, APRN - CNP   0.5 mg at 05/10/22 0920    LORazepam (ATIVAN) injection 0.5 mg  0.5 mg IntraVENous Q12H PRN Darby Hallie, APRN - CNP   0.5 mg at 05/09/22 2121    sodium chloride flush 0.9 % injection 5-40 mL  5-40 mL IntraVENous 2 times per day Piotr Yepez MD   10 mL at 05/10/22 0923    0.9 % sodium chloride infusion   IntraVENous PRN Piotr Yepez MD        ondansetron (ZOFRAN-ODT) disintegrating tablet 4 mg  4 mg Oral Q8H PRN Piotr Yepez MD        Or    ondansetron (ZOFRAN) injection 4 mg  4 mg IntraVENous Q6H PRN Piotr Yepez MD   4 mg at 05/10/22 0241    acetaminophen (TYLENOL) tablet 650 mg  650 mg Oral Q6H PRN Piotr Yepez MD        Or    acetaminophen (TYLENOL) suppository 650 mg  650 mg Rectal Q6H PRN Noel Blackwood MD        sodium chloride flush 0.9 % injection 5-40 mL  5-40 mL IntraVENous 2 times per day Peggy Mendoza MD   10 mL at 05/10/22 0921    sodium chloride flush 0.9 % injection 5-40 mL  5-40 mL IntraVENous PRN Peggy Mendoza MD   20 mL at 05/10/22 0601    0.9 % sodium chloride infusion   IntraVENous PRN Peggy Mendoza MD        heparin (porcine) injection 5,000 Units  5,000 Units SubCUTAneous TID Peggy Mendoza MD   5,000 Units at 05/10/22 0920    polyethylene glycol (GLYCOLAX) packet 17 g  17 g Oral Daily PRN Peggy Mendoza MD        glucose (GLUTOSE) 40 % oral gel 15 g  15 g Oral PRN Rodrigo Chambers MD        glucagon (rDNA) injection 1 mg  1 mg IntraMUSCular PRN Rodrigo Chambers MD        dextrose 5 % solution  100 mL/hr IntraVENous PRN Rodrigo Chambers MD        insulin lispro (HUMALOG) injection vial 0-6 Units  0-6 Units SubCUTAneous Q4H Rodrigo Chambers MD        dextrose bolus 10% 125 mL  125 mL IntraVENous PRN Rodrigo Chambers MD        Or    dextrose bolus 10% 250 mL  250 mL IntraVENous PRN Rodrigo Chambers MD        heparin (porcine) injection 1,000 Units  1,000 Units IntraVENous Lary Armas MD        diphenhydrAMINE (BENADRYL) injection 25 mg  25 mg IntraVENous Q6H PRN ITALO Landin - CNP   25 mg at 05/10/22 0601       Allergies:     Allergies   Allergen Reactions    Haloperidol Lactate Other (See Comments)     PT DOESN'T REMEMBER      Ziprasidone Hcl Other (See Comments)     PT DOESN'T REMEMBER         Problem List:    Patient Active Problem List   Diagnosis Code    Unresponsive episode R41.89    Syncope R55    Acute respiratory failure with hypoxia and hypercapnia ([facility]) J96.01, J96.02    Pneumonia due to infectious organism J18.9    ESRD on dialysis ([facility]) N18.6, Z99.2    Chronic respiratory failure requiring continuous mechanical ventilation through tracheostomy [facility] J96.10, Z93.0, Z99.11    Acute on chronic respiratory failure with hypoxia ([facility]) J96.21    Acute pulmonary edema ([facility]) J81.0    Rectal pain K62.89    History of CVA (cerebrovascular accident) Z80.78    HCAP (healthcare-associated pneumonia) J18.9    Anemia, chronic disease D63.8    Anxiety F41.9    Chronic diastolic heart failure ([facility]) I50.32    Dysphagia, pharyngeal phase R13.13    Essential hypertension I10    GERD (gastroesophageal reflux disease) K21.9    Morbid obesity with BMI of 50.0-59.9, adult ([facility]) E66.01, Z68.43    SILVER (obstructive sleep apnea) G47.33    Abnormal chest x-ray R93.89    Vaginal bleeding N93.9    SBO (small bowel obstruction) ([facility] K56.609  Fecal impaction ([facility]) K56.41       Past Medical History:        Diagnosis Date    Acute and chronic respiratory failure (acute-on-chronic) ([facility])     Acute blood loss anemia 7/1/2020    Acute deep vein thrombosis (DVT) of brachial vein of left upper extremity ([address] 2/20/2019    Formatting of this note might be different from the original. Dx February 2019    Anxiety disorder     Atherosclerotic heart disease of native coronary artery without angina pectoris     Bleeding hemorrhoids 6/29/2020    Cerebellar infarct ([address] 9/29/2019    Cerebral infarction ([address]     Chronic pain syndrome     Dependence on renal dialysis ([address]     Dependence on respirator ([facility])     End stage renal disease ([address]     Gastro-esophageal reflux disease with esophagitis, without bleeding     Insomnia     Major depressive disorder, recurrent ([address]     Morbid obesity due to excess calories ([address]     Muscle weakness     Obstructive sleep apnea (adult) (pediatric)     Other hyperlipidemia     Other voice and resonance disorders     Personal history of COVID-19     Pulmonary hypertension ([address]     Tracheostomy status ([address]     Type 2 diabetes mellitus without complications ([address]     Unspecified atrial fibrillation ([address]        Past Surgical History:  History reviewed. No pertinent surgical history.     Social History:    Social History     Tobacco Use    Smoking status: Former Smoker    Smokeless tobacco: Never Used   Substance Use Topics    Alcohol use: Never                                Counseling given: Not Answered      Vital Signs (Current):   Vitals:    05/10/22 0311 05/10/22 0600 05/10/22 0713 05/10/22 0925   BP:   (!) 141/72    Pulse:   82    Resp: 18 18 18    Temp:   98.3 °F (36.8 °C)    TempSrc:   Oral    SpO2:   100% 99%   Weight:  262 lb 3.2 oz (118.9 kg)     Height:                                                  BP Readings from Last 3 Encounters:   05/10/22 (!) 141/72   05/01/22 138/73   04/26/22 122/62       NPO Status:                                                                                 BMI:   Wt Readings from Last 3 Encounters:   05/10/22 262 lb 3.2 oz (118.9 kg)   05/01/22 (!) 309 lb 8 oz (140.4 kg)   04/26/22 291 lb (132 kg)     Body mass index is 45.01 kg/m².     CBC:   Lab Results   Component Value Date    WBC 7.1 05/10/2022    RBC 2.64 05/10/2022    HGB 7.9 05/10/2022    HCT 25.4 05/10/2022    MCV 96.0 05/10/2022    RDW 19.0 05/10/2022     05/10/2022       CMP:   Lab Results   Component Value Date     05/10/2022    K 4.8 05/10/2022    K 4.7 05/06/2022    CL 94 05/10/2022    CO2 26 05/10/2022    BUN 74 05/10/2022    CREATININE 7.4 05/10/2022    GFRAA 7 05/10/2022    AGRATIO 0.6 05/06/2022    LABGLOM 5 05/10/2022    GLUCOSE 102 05/10/2022    PROT 8.5 05/06/2022    CALCIUM 9.1 05/10/2022    BILITOT 0.3 05/06/2022    ALKPHOS 98 05/06/2022    AST 10 05/06/2022    ALT 7 05/06/2022       POC Tests:   Recent Labs     05/10/22  0921   POCGLU 101*       Coags:   Lab Results   Component Value Date    PROTIME 15.2 05/01/2022    INR 1.33 05/01/2022    APTT 34.2 04/27/2022       HCG (If Applicable): No results found for: PREGTESTUR, PREGSERUM, HCG, HCGQUANT     ABGs:   Lab Results   Component Value Date    PHART 7.399 04/30/2022    PO2ART 55.1 04/30/2022    ZYJ5OFC 39.9 04/30/2022    GEK5VBW 24.7 04/30/2022    BEART 0 04/30/2022    K9DTJIKC 88 04/30/2022        Type & Screen (If Applicable):  No results found for: LABABO, LABRH    Drug/Infectious Status (If Applicable):  No results found for: HIV, HEPCAB    COVID-19 Screening (If Applicable):   Lab Results   Component Value Date    COVID19 Not Detected 02/21/2022    COVID19 NOT DETECTED 02/17/2022           Anesthesia Evaluation  Patient summary reviewed and Nursing notes reviewed no history of anesthetic complications:   Airway: Mallampati: III        Dental:          Pulmonary:   (+) pneumonia:  sleep apnea:                            ROS comment: Cuffed size 6 Shiley XLT trach in place  Chronic resp failure   Cardiovascular:    (+) hypertension:, CAD:,         Rhythm: irregular  Rate: abnormal                 ROS comment: Transthoracic Echocardiography Report (TTE)      Demographics      Patient Name       Brianda Aguilera      Date of Study      02/18/2022         Gender              Female      Patient Number     [id number]         Date of Birth       1950      Visit Number       [id number]          Age                 80 year(s)      Accession Number   [id number]         Room Number               Corporate ID       [id number]           [address] [facility]      Ordering Physician Olman Griffith,       Interpreting        Cheyenne Zhao,                      APRN-CNP           Physician           MD, [facility]     Procedure     Type of Study      TTE procedure:ECHO 2D W/DOPPLER/COLOR/CONTRAST. Procedure Date  Date: 02/18/2022 Start: 08:43 AM     Study Location: [facility] - Echo Lab  Technical Quality: Poor visualization due to body habitus.     Additional Indications:Elevated BNP. .     Patient Status: Routine     Contrast Medium: Definity.     Height: 63.78 inches Weight: 316.01 pounds BSA: 2.37 m2 BMI: 54.62 kg/m2     BP: 136/78 mmHg      Conclusions      Summary   Definity contrast administered. Left ventricular systolic function is mildly reduced with visually estimated   EF of 45%. Endocardium not entirely well visualized but no obvious segmental wall   motion abnormalities. Diastolic dysfunction grade and filling pressure are indeterminate. Mild concentric left ventricular hypertrophy. Unable to obtain SPAP due to lack of tricuspid regurgitation. Valves are not well visualized but there are no obvious structural   abnormalities or color flow abnormalities seen on doppler. 9- [facility].  60%      Signature      ------------------------------------------------------------------ Electronically signed by Eleazar Smith MD, [facility]   (Interpreting physician) on 02/18/2022 at 11:31 AM     Neuro/Psych:   (+) CVA:,             GI/Hepatic/Renal:   (+) GERD:, renal disease (HD T, Th, S): ESRD and dialysis, morbid obesity         ROS comment: Anemia  SBO. Endo/Other:    (+) Diabetes, blood dyscrasia: anticoagulation therapy:., .                 Abdominal:   (+) obese,           Vascular:   + DVT, . Other Findings:           Anesthesia Plan      general     ASA 4       Induction: intravenous. MIPS: Postoperative opioids intended and Prophylactic antiemetics administered. Anesthetic plan and risks discussed with patient and child/children (daughter). Plan discussed with CRNA.                   Rachel Bliss MD   5/10/2022

## 2022-05-10 NOTE — BRIEF OP NOTE
Brief Postoperative Note      Patient: Omar Reid  YOB: 1950  MRN: [id number]    Date of Procedure: 5/10/2022    Pre-Op Diagnosis: Small Bowel Obstruction    Post-Op Diagnosis: Same       Procedure(s):  EXPLORATORY LAPAROTOMY, LYSIS OF ADHESIONS, POSSIBLE SMALL BOWEL RESECTION    Surgeon(s):  Maame Palacio MD    Assistant:  Surgical Assistant: Taran Mota    Anesthesia: General    Estimated Blood Loss (mL): Minimal    Complications: None    Specimens:   * No specimens in log *    Implants:  * No implants in log *      Drains:   Negative Pressure Wound Therapy Abdomen Medial (Active)       NG/OG/NJ/NE Tube Nasogastric 16 fr Right nostril (Active)   Surrounding Skin Clean, dry & intact 05/09/22 1920   Securement device Adhesive based mckeon 05/10/22 0713   Status Clamped 05/10/22 0713   Placement Verified X-Ray (Initial); Gastric Contents; Respiratory Status 05/09/22 1530   NG/OG/NJ/NE External Measurement (cm) 70 cm 05/10/22 0713   Drainage Appearance Bile 05/09/22 1530   Output (mL) 400 ml 05/09/22 0624       [REMOVED] NG/OG/NJ/NE Tube Nasogastric 16 fr Left nostril (Removed)   Surrounding Skin Clean, dry & intact 05/07/22 0134   Securement device Other (comment) 05/07/22 0134   Status Suction-low intermittent 05/07/22 0134   Placement Verified X-Ray (Initial) 05/07/22 0134   NG/OG/NJ/NE External Measurement (cm) 60 cm 05/07/22 0134   Drainage Appearance Bile 05/07/22 0134       [REMOVED] Urinary Catheter Arevalo (Removed)   Catheter Indications Need for fluid volume management of the critically ill patient in a critical care setting 05/09/22 1232   Site Assessment Pink 05/08/22 2045   Urine Color Roxanne 05/09/22 0114   Urine Appearance Cloudy 05/09/22 0114   Urine Odor Malodorous 05/09/22 0114   Collection Container Standard 05/09/22 1232   Securement Method Securing device (Describe); Other (Comment) 05/08/22 2045   Catheter Care Completed Yes 05/08/22 2045   Catheter Best Practices Drainage tube clipped to bed; Bag below bladder;Bag not on floor; Lack of dependent loop in tubing;Drainage bag less than half full 05/09/22 1232   Status Draining 05/09/22 1232   Output (mL) 100 mL 05/09/22 0114       [REMOVED] External Urinary Catheter (Removed)   Placement Initiated 04/27/22 1220   Perineal Care Yes 04/27/22 1220   Suction 40 mmgHg continuous 04/27/22 1220   Output (mL) 0 mL 04/27/22 1220       Findings: Ileal SBO, high grade. Secondary to adhesions and scarring into old lower abdominal surgery site and previous mesh placed for a hernia repair. VIC completed with release of obstruction, and hernia reduced and repaired primarily. To ICU post op.     Electronically signed by Jose Smart MD on 5/10/2022 at 6:47 PM

## 2022-05-10 NOTE — PROGRESS NOTES
irregular, S1 S2+  Lungs: decreased breath sounds at the bases  Abd: soft, bowel sounds+, non-tender  Ext: Trace bilateral LE edema, no cyanosis  Skin: Warm. No rashes appreciated. Access: Left upper arm AV graft +bruit. Labs:  CBC:   Lab Results   Component Value Date    WBC 7.1 05/10/2022    RBC 2.64 05/10/2022    HGB 7.9 05/10/2022    HCT 25.4 05/10/2022    MCV 96.0 05/10/2022    MCH 30.1 05/10/2022    MCHC 31.3 05/10/2022    RDW 19.0 05/10/2022     05/10/2022    MPV 7.8 05/10/2022     BMP:    Lab Results   Component Value Date     05/10/2022    K 4.8 05/10/2022    K 4.7 05/06/2022    CL 94 05/10/2022    CO2 26 05/10/2022    BUN 74 05/10/2022    LABALBU 3.2 05/06/2022    CREATININE 7.4 05/10/2022    CALCIUM 9.1 05/10/2022    GFRAA 7 05/10/2022    LABGLOM 5 05/10/2022    GLUCOSE 102 05/10/2022       Assessment/Plan:    ESRD   on HD TTS at [facility]  -last outpatient HD on 5/3  -missed 5/5  -Got HD 5/6 x 2 hours  - HD back to Tues/Thurs/Sat schedule. She cut her treatment off after 50 minutes Saturday and declines dialysis today  HD today as per schedule. Hyperkalemia mild, improved  Due to incomplete dialysis  NPO for now     Small bowel obstruction partial   On conservative management for now per surgery    Vaginal bleeding  Possibly from fibroid uterus     Anemia in CKD  Hb below goal  Continue ESAs with HD    Hypertension not very well controlled  Stop IVF    Chronic resp failure: s/p trach    Renal cysts bilateral  Left renal lesion  Needs further imaging to characterize        Elvie Tse MD  [facility]  Office Number: [phone]

## 2022-05-10 NOTE — PROGRESS NOTES
[address] [facility] PROGRESS NOTE    5/10/2022 8:11 AM        Name: Ava Loja . Admitted: 5/6/2022  Primary Care Provider: Megan Trejo MD (Tel: [phone])      Subjective: Nuria Hua Pt seen this am while on HD. Reports abd  Pain and fullness/  Asking for something to drink. NG tube in place.   Has been scratching herself,  Abrasions noted across her chest and arms     Reviewed interval ancillary notes    Current Medications  metoprolol tartrate (LOPRESSOR) tablet 25 mg, BID  QUEtiapine (SEROQUEL) tablet 12.5 mg, Nightly  pantoprazole (PROTONIX) injection 20 mg, Daily  metoprolol (LOPRESSOR) injection 5 mg, Q6H PRN  HYDROmorphone HCl PF (DILAUDID) injection 0.5 mg, Q3H PRN  LORazepam (ATIVAN) injection 0.5 mg, Q12H PRN  sodium chloride flush 0.9 % injection 5-40 mL, 2 times per day  0.9 % sodium chloride infusion, PRN  ondansetron (ZOFRAN-ODT) disintegrating tablet 4 mg, Q8H PRN   Or  ondansetron (ZOFRAN) injection 4 mg, Q6H PRN  acetaminophen (TYLENOL) tablet 650 mg, Q6H PRN   Or  acetaminophen (TYLENOL) suppository 650 mg, Q6H PRN  sodium chloride flush 0.9 % injection 5-40 mL, 2 times per day  sodium chloride flush 0.9 % injection 5-40 mL, PRN  0.9 % sodium chloride infusion, PRN  heparin (porcine) injection 5,000 Units, TID  polyethylene glycol (GLYCOLAX) packet 17 g, Daily PRN  glucose (GLUTOSE) 40 % oral gel 15 g, PRN  glucagon (rDNA) injection 1 mg, PRN  dextrose 5 % solution, PRN  insulin lispro (HUMALOG) injection vial 0-6 Units, Q4H  dextrose bolus 10% 125 mL, PRN   Or  dextrose bolus 10% 250 mL, PRN  heparin (porcine) injection 1,000 Units, Once  epoetin claire-epbx (RETACRIT) injection 10,000 Units, Once per day on Mon Wed Fri  diphenhydrAMINE (BENADRYL) injection 25 mg, Q6H PRN        Objective:  BP (!) 141/72   Pulse 82   Temp 98.3 °F (36.8 °C) (Oral)   Resp 18   Ht 5' 4\" (1.626 m)   Wt 262 ([facility]    SILVER (obstructive sleep apnea)  Resolved Problems:    * No resolved hospital problems. *       Assessment & Plan:   1. SBO:  Imaging does not show any improvement. GS following, anticipate possible surgery exploration  2. PAF;  Now in SR,  On BB   3. ESRD:  On HD q [facility], Saturday,  Nephrology is following  4. Hx of respiratory failure:  S/p trach   5. T2DM:  Currently controlled with AIC 5.4 continue with humalog correction as needed   6. Hx of DVT: coumadin on hold in light of possible surgery   7. Bilateral renal cysts per CT scan:  Needs dedicated MRI of kidney per renal mass protocol to exclude neoplasm   8.  Anemia of chronic disease       Diet: Diet NPO Exceptions are: Ice Chips, Sips of Water with Meds, Sips of Clear Liquids, Popsicles  Code:Full Code  DVT PPX    ITALO Collins CNP   5/10/2022 8:11 AM

## 2022-05-11 LAB
ANION GAP SERPL CALCULATED.3IONS-SCNC: 20 MMOL/L (ref 3–16)
BASE EXCESS ARTERIAL: 1.6 MMOL/L (ref -3–3)
BUN BLDV-MCNC: 44 MG/DL (ref 7–20)
CALCIUM SERPL-MCNC: 8.9 MG/DL (ref 8.3–10.6)
CARBOXYHEMOGLOBIN ARTERIAL: 1 % (ref 0–1.5)
CHLORIDE BLD-SCNC: 96 MMOL/L (ref 99–110)
CO2: 25 MMOL/L (ref 21–32)
CREAT SERPL-MCNC: 5 MG/DL (ref 0.6–1.2)
GFR AFRICAN AMERICAN: 10
GFR NON-AFRICAN AMERICAN: 9
GLUCOSE BLD-MCNC: 106 MG/DL (ref 70–99)
GLUCOSE BLD-MCNC: 107 MG/DL (ref 70–99)
GLUCOSE BLD-MCNC: 108 MG/DL (ref 70–99)
GLUCOSE BLD-MCNC: 109 MG/DL (ref 70–99)
GLUCOSE BLD-MCNC: 110 MG/DL (ref 70–99)
GLUCOSE BLD-MCNC: 137 MG/DL (ref 70–99)
GLUCOSE BLD-MCNC: 172 MG/DL (ref 70–99)
HCO3 ARTERIAL: 27.2 MMOL/L (ref 21–29)
HCT VFR BLD CALC: 24.6 % (ref 36–48)
HEMOGLOBIN, ART, EXTENDED: 7.2 G/DL (ref 12–16)
HEMOGLOBIN: 7.5 G/DL (ref 12–16)
MAGNESIUM: 2 MG/DL (ref 1.8–2.4)
MCH RBC QN AUTO: 30 PG (ref 26–34)
MCHC RBC AUTO-ENTMCNC: 30.7 G/DL (ref 31–36)
MCV RBC AUTO: 97.8 FL (ref 80–100)
METHEMOGLOBIN ARTERIAL: 0.7 %
O2 SAT, ARTERIAL: 99.3 %
O2 THERAPY: ABNORMAL
PCO2 ARTERIAL: 47.7 MMHG (ref 35–45)
PDW BLD-RTO: 18.9 % (ref 12.4–15.4)
PERFORMED ON: ABNORMAL
PH ARTERIAL: 7.37 (ref 7.35–7.45)
PHOSPHORUS: 5.4 MG/DL (ref 2.5–4.9)
PLATELET # BLD: 411 K/UL (ref 135–450)
PMV BLD AUTO: 7.5 FL (ref 5–10.5)
PO2 ARTERIAL: 148 MMHG (ref 75–108)
POTASSIUM SERPL-SCNC: 3.8 MMOL/L (ref 3.5–5.1)
RBC # BLD: 2.52 M/UL (ref 4–5.2)
SODIUM BLD-SCNC: 141 MMOL/L (ref 136–145)
TCO2 ARTERIAL: 64.3 MMOL/L
WBC # BLD: 7.5 K/UL (ref 4–11)

## 2022-05-11 PROCEDURE — 83735 ASSAY OF MAGNESIUM: CPT

## 2022-05-11 PROCEDURE — 84100 ASSAY OF PHOSPHORUS: CPT

## 2022-05-11 PROCEDURE — APPSS15 APP SPLIT SHARED TIME 0-15 MINUTES: Performed by: NURSE PRACTITIONER

## 2022-05-11 PROCEDURE — 6370000000 HC RX 637 (ALT 250 FOR IP): Performed by: SURGERY

## 2022-05-11 PROCEDURE — 82803 BLOOD GASES ANY COMBINATION: CPT

## 2022-05-11 PROCEDURE — C9113 INJ PANTOPRAZOLE SODIUM, VIA: HCPCS | Performed by: SURGERY

## 2022-05-11 PROCEDURE — 6360000002 HC RX W HCPCS: Performed by: SURGERY

## 2022-05-11 PROCEDURE — 80048 BASIC METABOLIC PNL TOTAL CA: CPT

## 2022-05-11 PROCEDURE — 2700000000 HC OXYGEN THERAPY PER DAY

## 2022-05-11 PROCEDURE — 99024 POSTOP FOLLOW-UP VISIT: CPT | Performed by: SURGERY

## 2022-05-11 PROCEDURE — 94761 N-INVAS EAR/PLS OXIMETRY MLT: CPT

## 2022-05-11 PROCEDURE — 2500000003 HC RX 250 WO HCPCS: Performed by: INTERNAL MEDICINE

## 2022-05-11 PROCEDURE — 85027 COMPLETE CBC AUTOMATED: CPT

## 2022-05-11 PROCEDURE — 2580000003 HC RX 258: Performed by: SURGERY

## 2022-05-11 PROCEDURE — APPNB30 APP NON BILLABLE TIME 0-30 MINS: Performed by: NURSE PRACTITIONER

## 2022-05-11 PROCEDURE — 36415 COLL VENOUS BLD VENIPUNCTURE: CPT

## 2022-05-11 PROCEDURE — 2000000000 HC ICU R&B

## 2022-05-11 PROCEDURE — 2500000003 HC RX 250 WO HCPCS: Performed by: SURGERY

## 2022-05-11 PROCEDURE — 99291 CRITICAL CARE FIRST HOUR: CPT | Performed by: INTERNAL MEDICINE

## 2022-05-11 RX ADMIN — METOPROLOL TARTRATE 25 MG: 25 TABLET, FILM COATED ORAL at 21:37

## 2022-05-11 RX ADMIN — HYDROMORPHONE HYDROCHLORIDE 0.5 MG: 1 INJECTION, SOLUTION INTRAMUSCULAR; INTRAVENOUS; SUBCUTANEOUS at 21:33

## 2022-05-11 RX ADMIN — METRONIDAZOLE 500 MG: 500 INJECTION, SOLUTION INTRAVENOUS at 09:20

## 2022-05-11 RX ADMIN — HEPARIN SODIUM 5000 UNITS: 5000 INJECTION INTRAVENOUS; SUBCUTANEOUS at 21:36

## 2022-05-11 RX ADMIN — Medication 10 ML: at 09:25

## 2022-05-11 RX ADMIN — QUETIAPINE FUMARATE 12.5 MG: 25 TABLET ORAL at 21:36

## 2022-05-11 RX ADMIN — PANTOPRAZOLE SODIUM 20 MG: 40 INJECTION, POWDER, FOR SOLUTION INTRAVENOUS at 09:22

## 2022-05-11 RX ADMIN — Medication 10 ML: at 21:54

## 2022-05-11 RX ADMIN — HEPARIN SODIUM 5000 UNITS: 5000 INJECTION INTRAVENOUS; SUBCUTANEOUS at 09:24

## 2022-05-11 RX ADMIN — FAMOTIDINE 20 MG: 10 INJECTION, SOLUTION INTRAVENOUS at 09:24

## 2022-05-11 RX ADMIN — DIPHENHYDRAMINE HYDROCHLORIDE 25 MG: 50 INJECTION, SOLUTION INTRAMUSCULAR; INTRAVENOUS at 21:47

## 2022-05-11 RX ADMIN — Medication 10 ML: at 21:55

## 2022-05-11 RX ADMIN — INSULIN LISPRO 1 UNITS: 100 INJECTION, SOLUTION INTRAVENOUS; SUBCUTANEOUS at 21:37

## 2022-05-11 RX ADMIN — METOCLOPRAMIDE 3.5 MG: 5 INJECTION, SOLUTION INTRAMUSCULAR; INTRAVENOUS at 14:49

## 2022-05-11 RX ADMIN — HEPARIN SODIUM 5000 UNITS: 5000 INJECTION INTRAVENOUS; SUBCUTANEOUS at 14:49

## 2022-05-11 RX ADMIN — PROPOFOL 20 MCG/KG/MIN: 10 INJECTION, EMULSION INTRAVENOUS at 05:10

## 2022-05-11 RX ADMIN — METOCLOPRAMIDE 3.5 MG: 5 INJECTION, SOLUTION INTRAMUSCULAR; INTRAVENOUS at 02:42

## 2022-05-11 RX ADMIN — METOCLOPRAMIDE 3.5 MG: 5 INJECTION, SOLUTION INTRAMUSCULAR; INTRAVENOUS at 09:21

## 2022-05-11 RX ADMIN — ASCORBIC ACID, VITAMIN A PALMITATE, CHOLECALCIFEROL, THIAMINE HYDROCHLORIDE, RIBOFLAVIN-5 PHOSPHATE SODIUM, PYRIDOXINE HYDROCHLORIDE, NIACINAMIDE, DEXPANTHENOL, ALPHA-TOCOPHEROL ACETATE, VITAMIN K1, FOLIC ACID, BIOTIN, CYANOCOBALAMIN: 200; 3300; 200; 6; 3.6; 6; 40; 15; 10; 150; 600; 60; 5 INJECTION, SOLUTION INTRAVENOUS at 19:14

## 2022-05-11 RX ADMIN — PROPOFOL 10 MCG/KG/MIN: 10 INJECTION, EMULSION INTRAVENOUS at 12:29

## 2022-05-11 RX ADMIN — CIPROFLOXACIN 400 MG: 2 INJECTION, SOLUTION INTRAVENOUS at 05:15

## 2022-05-11 RX ADMIN — METRONIDAZOLE 500 MG: 500 INJECTION, SOLUTION INTRAVENOUS at 01:09

## 2022-05-11 ASSESSMENT — PAIN SCALES - GENERAL
PAINLEVEL_OUTOF10: 0
PAINLEVEL_OUTOF10: 0
PAINLEVEL_OUTOF10: 8
PAINLEVEL_OUTOF10: 10
PAINLEVEL_OUTOF10: 10
PAINLEVEL_OUTOF10: 0
PAINLEVEL_OUTOF10: 8

## 2022-05-11 ASSESSMENT — PAIN DESCRIPTION - PAIN TYPE
TYPE: ACUTE PAIN
TYPE: ACUTE PAIN

## 2022-05-11 ASSESSMENT — PAIN DESCRIPTION - LOCATION
LOCATION: ABDOMEN
LOCATION_2: ANKLE
LOCATION: ABDOMEN
LOCATION_2: ANKLE

## 2022-05-11 ASSESSMENT — PAIN DESCRIPTION - ORIENTATION
ORIENTATION: ANTERIOR
ORIENTATION: ANTERIOR

## 2022-05-11 ASSESSMENT — PAIN - FUNCTIONAL ASSESSMENT
PAIN_FUNCTIONAL_ASSESSMENT: PREVENTS OR INTERFERES SOME ACTIVE ACTIVITIES AND ADLS
PAIN_FUNCTIONAL_ASSESSMENT: PREVENTS OR INTERFERES SOME ACTIVE ACTIVITIES AND ADLS

## 2022-05-11 ASSESSMENT — PAIN DESCRIPTION - DESCRIPTORS
DESCRIPTORS: SHARP
DESCRIPTORS: SHARP

## 2022-05-11 ASSESSMENT — PAIN SCALES - WONG BAKER: WONGBAKER_NUMERICALRESPONSE: 0

## 2022-05-11 ASSESSMENT — PAIN DESCRIPTION - ONSET
ONSET: ON-GOING
ONSET: ON-GOING

## 2022-05-11 ASSESSMENT — PAIN DESCRIPTION - FREQUENCY
FREQUENCY: CONTINUOUS
FREQUENCY: CONTINUOUS

## 2022-05-11 ASSESSMENT — PULMONARY FUNCTION TESTS: PIF_VALUE: 32

## 2022-05-11 ASSESSMENT — PAIN DESCRIPTION - INTENSITY
RATING_2: 0
RATING_2: 0

## 2022-05-11 NOTE — PROGRESS NOTES
Nephrology Progress Note  [facility]  [phone]   [email or web address]    Patient:  [place]   : 1950    Reason for Consultation : ESRD on HD     Brief HPI   Mrs. Cesar Maki is a 79 yo woman with h/o ESRD on HD TTS, chronic resp failure s/p trach, h/o CVA, morbid obesity, DM II, Pulm HTN, Afib on AC was admitted for abdominal pain, nausea and emesis.    She was sent from [facility] for sob and the above symptoms  Imaging on admission showed   partial small bowel obstruction, with a discrete transition   point noted adjacent to hernia mesh within the anterior lower abdomen     Subjective/Interval history    Pt seen and examined  Had dialysis yesterday  S/p surgery yesterday and remains on vent  Requiring vasopressors      Meds:  Scheduled Meds:   epoetin claire-epbx  10,000 Units IntraVENous Once per day on  Sat    chlorhexidine  15 mL Mouth/Throat BID    famotidine (PEPCID) injection  20 mg IntraVENous Daily    metoclopramide  3.5 mg IntraVENous Q6H    metroNIDAZOLE  500 mg IntraVENous Q8H    metoprolol tartrate  25 mg Oral BID    QUEtiapine  12.5 mg Oral Nightly    pantoprazole  20 mg IntraVENous Daily    sodium chloride flush  5-40 mL IntraVENous 2 times per day    sodium chloride flush  5-40 mL IntraVENous 2 times per day    heparin (porcine)  5,000 Units SubCUTAneous TID    insulin lispro  0-6 Units SubCUTAneous Q4H    heparin (porcine)  1,000 Units IntraVENous Once     Continuous Infusions:   fentaNYL 50 mcg/hr (22 0615)    propofol 20 mcg/kg/min (22 0615)    phenylephrine (RUSS-SYNEPHRINE) 50mg/250mL infusion 30 mcg/min (22 0916)    sodium chloride      sodium chloride 5 mL/hr at 05/10/22 2209    dextrose       PRN Meds:.morphine **OR** morphine, metoprolol, HYDROmorphone, LORazepam, sodium chloride, ondansetron **OR** ondansetron, acetaminophen **OR** acetaminophen, sodium chloride flush, sodium chloride, polyethylene glycol, glucose, glucagon (rDNA), dextrose, dextrose bolus **OR** dextrose bolus, diphenhydrAMINE    Vitals:  BP (!) 134/50   Pulse 78   Temp 97.4 °F (36.3 °C)   Resp 20   Ht 5' 4\" (1.626 m)   Wt 258 lb 1.6 oz (117.1 kg)   SpO2 100%   BMI 44.30 kg/m²     Physical Exam:  Gen: on trach and vent, not in resp distress  HEENT: PERRL, NGT+  CV: irregularly irregular, S1 S2+  Lungs: vent associated breath sounds+   Abd: abdominal binder+  Ext: Trace bilateral LE edema, no cyanosis  Access: Left upper arm AV graft +bruit. Labs:  CBC:   Lab Results   Component Value Date    WBC 7.5 05/11/2022    RBC 2.52 05/11/2022    HGB 7.5 05/11/2022    HCT 24.6 05/11/2022    MCV 97.8 05/11/2022    MCH 30.0 05/11/2022    MCHC 30.7 05/11/2022    RDW 18.9 05/11/2022     05/11/2022    MPV 7.5 05/11/2022     BMP:    Lab Results   Component Value Date     05/11/2022    K 3.8 05/11/2022    K 4.7 05/06/2022    CL 96 05/11/2022    CO2 25 05/11/2022    BUN 44 05/11/2022    LABALBU 3.2 05/06/2022    CREATININE 5.0 05/11/2022    CALCIUM 8.9 05/11/2022    GFRAA 10 05/11/2022    LABGLOM 9 05/11/2022    GLUCOSE 110 05/11/2022       Assessment/Plan:    ESRD   on HD TTS at [facility]  -last outpatient HD on 5/3  -missed 5/5  -Got HD 5/6 x 2 hours  - HD back to Tues/Thurs/Sat schedule. She cut her treatment off after 50 minutes Saturday and declines dialysis today  Next HD in AM, since on pressors, needs to be evaluated for iHD vs CRRT     Hyperkalemia mild, improved  Due to incomplete dialysis     Small bowel obstruction high grade  S/p ex-lap, VIC, hernia reduced 5/10     Vaginal bleeding  Possibly from fibroid uterus     Anemia in CKD  Hb below goal  Continue ESAs with HD    Chronic resp failure: s/p trach  Now on vent post-op    Renal cysts bilateral  Left renal lesion  Needs further imaging to characterize        Nargis De Souza MD  [facility]  Office Number: [phone]

## 2022-05-11 NOTE — PROGRESS NOTES
Nutrition Note    RECOMMENDATIONS  PO Diet: NPO  ONS: NPO  Nutrition Support:   1. OK for TPN to start per Dr. Teresa Harris  2. Recommend Clinimix 5/20 to start at 40 mL per hour and advance as tolerated until goal rate 83 mL per hour is achieved. 3. Clinimix 5/20 at goal rate 83 mL per hour to provide 2000 mL total volume, 1760 calories, 100 grams protein, dextrose load 2.37 mg/kg/min. 4. Recommend check TG, Mg, Phos, CMP now if not done in last 24 hours. 5. Recommend FSBS, monitor glucose, need for insulin  6. Pharmacy to adjust MVI and Trace Elements as needed     NUTRITION ASSESSMENT   Pt's nutrition status is declining as evidenced by NPO status x 5 days during admission. Pt now s/p exp lap with VIC for SBO in 5/10. Propofol infusing at 14.1 mL per hour to provide 372 calories from fat daily. Scotty at 20 mcg/min for blood pressure support. NG to suction. OK to begin TPN today per Dr. Teresa Harris given pt has been NPO x 5 days already during admission and do not anticipate immediate diet advancement post-op.  Nutrition Related Findings: Chronic trach. +1 non-pitting generalized edema. +2 non-pitting LUE edema. LBM 5/9. -4.1 liters.  Wounds: Stage II,Pressure Injury,Wound Vac   Nutrition Education:  Education not indicated    Nutrition Goals: Tolerate nutrition support at goal rate     MALNUTRITION ASSESSMENT   Acute Illness  Malnutrition Status:  At risk for malnutrition (Comment)  Findings of the 6 clinical characteristics of malnutrition:  Energy Intake:  50% or less of estimated energy requirements for 5 or more days  Weight Loss:  No significant weight loss     Body Fat Loss:  No significant body fat loss     Muscle Mass Loss:  No significant muscle mass loss    Fluid Accumulation:  Mild Extremities   Strength:  Not Performed      NUTRITION DIAGNOSIS   · Inadequate oral intake related to altered GI function as evidenced by NPO or clear liquid status due to medical condition    CURRENT NUTRITION THERAPIES  Diet NPO Exceptions are: Ice Chips, Sips of Water with Meds, Sips of Clear Liquids, Popsicles     PO Intake: NPO   PO Supplement Intake:NPO    ANTHROPOMETRICS   Current Height: 5' 4\" (162.6 cm)   Current Weight: 258 lb 1.6 oz (117.1 kg)     Admission weight: 298 lb (135.2 kg)   Ideal Body Weight (IBW): 120 lbs  (55 kg)        BMI: 44.3      COMPARATIVE STANDARDS  Energy (kcal):  6674-0834     Protein (g):  109 grams       Fluid (mL/day):  6132-8580 mL    The patient will be monitored per nutrition standards of care. Consult dietitian if additional nutrition interventions are needed prior to RD reassessment.      Cece Sullivan, [address] [place] [address],     Contact: [phone]

## 2022-05-11 NOTE — PROGRESS NOTES
ABG complete, page to pulmonology for new consult w/ vent settings & ABG. Order to increase rate to 16 & TV to 420 per Dr Alfa Mccartney. RT informed.  Repeat ABG in AM per MD.

## 2022-05-11 NOTE — PROGRESS NOTES
Clinical Pharmacy Note    Pharmacy consulted by Dr. Alfa Mccartney to manage TPN    Current TPN rate: N/A  Goal TPN rate: 83 ml/hr    Labs:  General Labs:  BMP:    Lab Results   Component Value Date     05/11/2022    K 3.8 05/11/2022    K 4.7 05/06/2022    CL 96 05/11/2022    CO2 25 05/11/2022    BUN 44 05/11/2022    LABALBU 3.2 05/06/2022    CREATININE 5.0 05/11/2022    CALCIUM 8.9 05/11/2022    GFRAA 10 05/11/2022    LABGLOM 9 05/11/2022    GLUCOSE 110 05/11/2022     Magnesium:  No results found for: MG  Phosphorus:    Lab Results   Component Value Date    PHOS 5.4 05/06/2022     Blood sugars: 107-124      Plan to start TPN at 40 ml/hr. Thank you for allowing pharmacy to participate in the care of this patient.     Katia Aguirre [facility], PharmD 5/11/2022

## 2022-05-11 NOTE — PROGRESS NOTES
[facility]        Progress Note    Pt Name: Ethan Bae  MRN: [id number]  [place]: 1950  Date of evaluation: 5/11/2022    Subjective:    No acute events overnight  Remains on ventilator  Pressors weaned off  Pain controlled  No nausea or vomiting, NGT in place  No stool since OR  Resting in bed at this time    Postoperative Day #1      Vital Signs:  Patient Vitals for the past 24 hrs:   BP Temp Temp src Pulse Resp SpO2 Weight   05/11/22 0715 (!) 134/50 -- -- 78 20 -- --   05/11/22 0700 (!) 144/48 -- -- 78 18 -- --   05/11/22 0645 (!) 133/59 -- -- 75 18 -- --   05/11/22 0630 (!) 129/54 -- -- 77 16 -- --   05/11/22 0615 139/60 -- -- 76 16 -- --   05/11/22 0600 (!) 153/49 -- -- 80 12 100 % --   05/11/22 0545 (!) 141/48 -- -- 77 19 -- --   05/11/22 0530 (!) 131/46 -- -- 78 18 -- --   05/11/22 0515 (!) 149/54 -- -- 86 16 -- --   05/11/22 0500 (!) 149/46 -- -- 81 13 100 % --   05/11/22 0445 (!) 150/53 -- -- 84 18 -- --   05/11/22 0430 (!) 153/56 -- -- 86 19 -- --   05/11/22 0415 (!) 142/71 -- -- 86 16 -- --   05/11/22 0412 -- -- -- 79 20 100 % --   05/11/22 0400 (!) 136/49 97.4 °F (36.3 °C) -- 79 18 100 % 258 lb 1.6 oz (117.1 kg)   05/11/22 0345 121/63 -- -- 89 16 -- --   05/11/22 0330 101/87 -- -- 82 16 -- --   05/11/22 0315 (!) 126/41 -- -- 76 16 -- --   05/11/22 0300 (!) 124/44 -- -- 76 16 -- --   05/11/22 0245 (!) 122/41 -- -- 76 16 -- --   05/11/22 0230 (!) 117/41 -- -- 76 16 -- --   05/11/22 0215 (!) 118/42 -- -- 76 16 -- --   05/11/22 0200 (!) 122/42 -- -- 75 16 -- --   05/11/22 0145 (!) 124/48 -- -- 75 17 -- --   05/11/22 0115 (!) 117/45 -- -- 73 17 -- --   05/11/22 0100 (!) 110/44 -- -- 76 9 -- --   05/11/22 0045 (!) 102/46 -- -- 76 16 -- --   05/11/22 0030 (!) 101/40 -- -- 77 13 -- --   05/11/22 0015 (!) 127/45 -- -- 75 9 -- --   05/11/22 0000 (!) 114/45 97.7 °F (36.5 °C) -- 73 16 100 % --   05/10/22 2350 -- -- -- 71 17 100 % --   05/10/22 2345 (!) 119/47 -- -- 71 18 -- --   05/10/22 2330 (!) 117/45 -- -- 76 16 -- --   05/10/22 2315 123/66 -- -- 76 13 -- --   05/10/22 2300 (!) 109/46 -- -- 80 16 -- --   05/10/22 2245 (!) 116/45 -- -- 83 15 -- --   05/10/22 2230 (!) 91/40 -- -- 84 17 -- --   05/10/22 2215 (!) 113/44 -- -- 86 12 -- --   05/10/22 2200 (!) 106/46 -- -- 87 14 100 % --   05/10/22 2100 (!) 102/53 -- -- 101 20 100 % --   05/10/22 2000 (!) 185/86 97.8 °F (36.6 °C) Temporal 118 16 100 % --   05/10/22 1910 -- -- -- 86 14 100 % --   05/10/22 1611 (!) 108/91 98.5 °F (36.9 °C) Temporal -- 23 -- --   05/10/22 1450 (!) 102/58 97.1 °F (36.2 °C) -- 110 18 -- 258 lb 13.1 oz (117.4 kg)   05/10/22 1130 (!) 140/67 97.1 °F (36.2 °C) -- 87 18 -- 262 lb 2 oz (118.9 kg)   05/10/22 0925 -- -- -- -- -- 99 % --      TEMPERATURE HISTORY 24H: Temp (24hrs), Av.2 °F (36.8 °C), Min:97.1 °F (36.2 °C), Max:98.8 °F (37.1 °C)    BLOOD PRESSURE HISTORY: Systolic (65UNR), FJS:904 , Min:84 , OCU:591    Diastolic (13EZY), DANISHA:41, Min:40, Max:91      Intake/Output:    I/O last 3 completed shifts: In: 1373.9 [I.V.:523.9; IV Piggyback:550]  Out: 3700 [Emesis/NG output:1900]  No intake/output data recorded. Drain/tube Output:           Physical Exam:  General: awake, drowsy, remains on ventilator since surgery  Pulmonary: unlabored on ventilator  Abdomen: soft, mild distension, appropriate incisional tenderness only, bowel sounds hypoactive  Skin/Wound: PREM dressing in place, seal intact, pump functioning     Labs:  CBC:    Recent Labs     05/09/22  0457 05/10/22  0522  0505   WBC 10.6 7.1 7.5   HGB 8.8* 7.9* 7.5*   HCT 30.2* 25.4* 24.6*    363 411     BMP:    Recent Labs     05/09/22  0458 05/10/22  0522  0505    141 141   K 5.2* 4.8 3.8   CL 97* 94* 96*   CO2 21 26 25   BUN 58* 74* 44*   CREATININE 6.5* 7.4* 5.0*   GLUCOSE 94 102* 110*     Hepatic:   No results for input(s): AST, ALT, ALB, BILITOT, ALKPHOS in the last 72 hours.   Amylase: No results for input(s): AMYLASE in the last 72 hours. Lipase: No results for input(s): LIPASE in the last 72 hours. Mag:    No results for input(s): MG in the last 72 hours. Phos:     No results for input(s): PHOS in the last 72 hours. Coags: No results for input(s): INR, APTT in the last 72 hours. Cultures:  Relevant cultures documented under results section     Pathology:   No relevant pathology     Imaging:  I have personally reviewed the following films:    XR CHEST PORTABLE    Result Date: 5/10/2022  EXAMINATION: ONE XRAY VIEW OF THE CHEST 5/10/2022 7:16 pm COMPARISON: None. HISTORY: ORDERING SYSTEM PROVIDED HISTORY: central line placement TECHNOLOGIST PROVIDED HISTORY: Reason for exam:->central line placement Reason for Exam: central line placement FINDINGS: Tracheostomy in place. Esophagogastric tube coursing below the diaphragm and elevated view. Left internal jugular central venous catheter terminates within the mid SVC. No postprocedure pneumothorax     Left internal jugular central venous catheter terminates within the mid SVC. No postprocedure pneumothorax     FL SMALL BOWEL FOLLOW THROUGH ONLY    Result Date: 5/10/2022  EXAMINATION: SMALL BOWEL FOLLOW THROUGH SERIES 5/9/2022 TECHNIQUE: Small bowel follow through series was performed with overhead images and spot images. FLUOROSCOPY DOSE AND TYPE OR TIME AND EXPOSURES: 0 COMPARISON: 05/07/2022 HISTORY: ORDERING SYSTEM PROVIDED HISTORY: water soluble contrast through NG, eval SBO TECHNOLOGIST PROVIDED HISTORY: Reason for exam:->water soluble contrast through NG, eval SBO Reason for Exam: eval SBO FINDINGS: There worsening dilated loops of small bowel throughout the abdomen likely due to a partial small bowel obstruction. The nasogastric tube terminates in gastric fundus. Degenerative changes involve the lumbar spine and bilateral hips. The bones are demineralized. The contrast never reaches the cecum even after 18 hours.      1. Worsening partial small bowel obstruction. Scheduled Meds:   epoetin claire-epbx  10,000 Units IntraVENous Once per day on Tue Thu Sat    chlorhexidine  15 mL Mouth/Throat BID    famotidine (PEPCID) injection  20 mg IntraVENous Daily    metoclopramide  3.5 mg IntraVENous Q6H    metroNIDAZOLE  500 mg IntraVENous Q8H    metoprolol tartrate  25 mg Oral BID    QUEtiapine  12.5 mg Oral Nightly    pantoprazole  20 mg IntraVENous Daily    sodium chloride flush  5-40 mL IntraVENous 2 times per day    sodium chloride flush  5-40 mL IntraVENous 2 times per day    heparin (porcine)  5,000 Units SubCUTAneous TID    insulin lispro  0-6 Units SubCUTAneous Q4H    heparin (porcine)  1,000 Units IntraVENous Once     Continuous Infusions:   fentaNYL 50 mcg/hr (05/11/22 0615)    propofol 20 mcg/kg/min (05/11/22 0615)    phenylephrine (RUSS-SYNEPHRINE) 50mg/250mL infusion 40 mcg/min (05/11/22 0823)    sodium chloride      sodium chloride 5 mL/hr at 05/10/22 2209    dextrose       PRN Meds:.morphine **OR** morphine, metoprolol, HYDROmorphone, LORazepam, sodium chloride, ondansetron **OR** ondansetron, acetaminophen **OR** acetaminophen, sodium chloride flush, sodium chloride, polyethylene glycol, glucose, glucagon (rDNA), dextrose, dextrose bolus **OR** dextrose bolus, diphenhydrAMINE      Assessment:  OR Date 5/10/2022, exploratory laparotomy with lysis of adhesions for small bowel obstruction  Fecal impaction  Chronic respiratory failure on trach/vent  Hypertension  Diabetes  End-stage renal disease on hemodialysis  Paroxysmal atrial fibrillation  History of DVT, on Coumadin  Vaginal bleeding  Morbid obesity, BMI 44.3      Plan:  1. Remains on ventilator, pressors weaned off, abdominal exam appropriate, PREM dressing intact--keep in place; continued supportive care  2. NPO with NGT decompression; monitor bowel function  3. IV hydration and electrolyte correction per nephrology  4. Activity as tolerated  5.  Ventilator management and weaning per pulmonology  6. PRN analgesics and antiemetics--minimizing narcotics as tolerated  7. DVT prophylaxis with heparin injections; hold oral anticoagulation  8. Management of medical comorbid etiologies per primary team and consulting services  9. Disposition: ICU    EDUCATION:  Educated patient on plan of care and disease process--all questions answered. Plans discussed with patient and nursing. Reviewed and discussed with Dr. Pari Mckeon.       Signed:  ITALO Munoz - CNP  5/11/2022 9:16 AM     Surgery Staff:     Pt seen and examined with NP  See full note above  Doing pretty well post op  Off pressor, off vent  Abd exam stable  Continue NG  Watch fluid status    Yakelin Pearson MD

## 2022-05-11 NOTE — PROGRESS NOTES
Pt arrived from OR. Connected to vent and monitors. Tachy / BP elevated. pt responsive to pain. Trach / vent AC 14 /  / peep 8 / Fio2 100%. Lungs clear / diminished. + gag. Ab rounded, soft. Mid abdominal dressing / PREM in place. Dressing clean,dry,intact. Ab binder on. +bowel sounds. NGT right nare @ 70 cm connected to intermittent LWS. Skin cool, dry. Pulses palpable. Generalized scratches, scabs, abrasions. abrasion right inner thigh. Small amount of vaginal bleeding? Few clots. Viviana care complete. Fistula RUE +bruit / thrill. CXR resulted, MD paged. Okay to use CVC. Propofol and fentanyl started per [facility]. Pt responding to voice, able to squeeze hands, wiggle toes, nod yes / no. Nods no to pain. Pt repositioned for comfort. Safety precautions in place. Orders released to review.

## 2022-05-11 NOTE — OP NOTE
[place] [address]                     [facility]                                OPERATIVE REPORT    PATIENT NAME: Carolina Caraballo                    :        1950  MED REC NO:   [id number]                          ROOM:       5916  ACCOUNT NO:   [de-identified]                           ADMIT DATE: 2022  PROVIDER:     Pinky Biggs MD    DATE OF PROCEDURE:  05/10/2022    PREOPERATIVE DIAGNOSIS:  Small bowel obstruction. POSTOPERATIVE DIAGNOSIS:  Small bowel obstruction. PROCEDURE:  Exploratory laparotomy with lysis of adhesions for small  bowel obstruction. SURGEON:  Pinky Biggs MD    ANESTHESIA:  General plus local.    ESTIMATED BLOOD LOSS:  Minimal.    COMPLICATIONS:  None. SPECIMENS:  None. INDICATIONS:  The patient is a 18-year-old female presenting with a  high-grade small bowel obstruction. She has not successfully resolved  this with bowel rest, NG decompression and presents to the operating  room today for definitive treatment. Plan for surgery, risks, benefits,  and alternatives were reviewed with the patient and her daughter and  both are in agreement and consent to proceed. FINDINGS:  In terms of findings at surgery, the patient had a high-grade  ileal small bowel obstruction. This was present and secondary to  adhesions and scarring into an old lower abdominal surgical site for   and prior hernia repair. The patient had a previous PTFE mesh  and spiral ProTacks in the area and the bowel was adhesed up in the  peritoneal tissue and the mesh in the region with the entrapped bowel  twisted and obstructed. There were also recurrent hernias around the  area, which were all taken down and repaired with the fascial closure. The bowel was viable and no resection was necessary. No enterotomies  were made with the case.     The patient does have significant comorbidities including indwelling  tracheostomy tube and morbid obesity and is transferred postoperatively  to the ICU for ongoing postoperative critical care management. ADDITIONAL DETAILS OF SURGERY:  The patient was brought to the operating  room, placed on operating table in supine position. Compression boots  were placed. General anesthetic was administered. The indwelling trach  was adjusted and used for her ventilator management per the Anesthesia  Team.  The abdomen was prepped and draped sterilely. Arms were padded  and protected carefully and after prepping and draping, a time-out was  done. A vertical midline laparotomy incision was made. Dissection was  carried down to skin and subcutaneous tissue through the abdominal wall  fascia, peritoneum and intra-abdominally. Immediately noted were  multiple loops of markedly distended small bowel and this was followed  down into the lower pelvic area. There were dense adhesions present at  the sigmoid colon, transverse colon, omentum and small bowel loops right  in this area. There was fatty incarceration of the omentum and  recurrent hernias right around the same area as well. The defects were  then all taken down. A sharp scissor dissection and Bovie  electrocautery were used to open up the fascial defects and get down to  the area of the bowel adhesed into the lower point, which was the point  of obstruction. Sharp scissor dissection was then done to carefully  mobilize the small bowel, untorsed it and freed up from its adhesions in  this area. This was all cleared away from the abdominal wall and the  prior mesh in this area. The bowel was then run distally to the cecum,  and then from the cecum back up into the ligament of Treitz. All small  bowel was viable and again the dilation was present, however, it was not  ischemic. We checked the area for the lysis of adhesions carefully  again and no enterotomies were present.   The small bowel was returned  back

## 2022-05-11 NOTE — PROGRESS NOTES
Shift assessment completed. Pt stays on trach vent with settings AC/VC, rate-16, peep-8 and fio2-40%. Pt alert and able to follow simple commands. Currently on 20 mch/kg/min of propofol, 20 mch/min of prince and 25 mcg/hr of fentanyl. Pt daughter bedside. Updated pt daughter with the plan of the day. Turned and repositioned pt. Will continue to monitor.

## 2022-05-11 NOTE — CONSULTS
 Gastro-esophageal reflux disease with esophagitis, without bleeding     Insomnia     Major depressive disorder, recurrent (HCC)     Morbid obesity due to excess calories (HCC)     Muscle weakness     Obstructive sleep apnea (adult) (pediatric)     Other hyperlipidemia     Other voice and resonance disorders     Personal history of COVID-19     Pulmonary hypertension ([facility]     Tracheostomy status ([facility]     Type 2 diabetes mellitus without complications ([facility]     Unspecified atrial fibrillation ([address]        PAST SURGICAL HISTORY:   Past Surgical History:   Procedure Laterality Date    COLECTOMY N/A 5/10/2022    EXPLORATORY LAPAROTOMY, LYSIS OF ADHESIONS performed by Yakelin Pearson MD at [facility]:   Social History     Tobacco Use    Smoking status: Former Smoker    Smokeless tobacco: Never Used   Substance Use Topics    Alcohol use: Never    Drug use: Never       FAMILY HISTORY: History reviewed. No pertinent family history. MEDICATIONS:     No current facility-administered medications on file prior to encounter.      Current Outpatient Medications on File Prior to Encounter   Medication Sig Dispense Refill    warfarin (COUMADIN) 3 MG tablet Take 1 tablet by mouth nightly 30 tablet 2    hydrocortisone (ANUSOL-HC) 2.5 % CREA rectal cream Place rectally 2 times daily 1 each 0    metoprolol succinate (TOPROL XL) 25 MG extended release tablet Take 0.5 tablets by mouth daily 30 tablet 3    insulin glargine (LANTUS) 100 UNIT/ML injection vial Inject 15 Units into the skin daily      aspirin 81 MG EC tablet Take 81 mg by mouth daily      cyanocobalamin 1000 MCG tablet Take 500 mcg by mouth daily      QUEtiapine (SEROQUEL) 25 MG tablet Take 12.5 mg by mouth nightly      sodium polystyrene (KAYEXALATE) 15 GM/60ML suspension Take 15 g by mouth 4 times daily Sun, Mon, Wed, Fri      calcium acetate 667 MG TABS Take 1,334 mg by mouth 3 times daily (with meals)      hydrocortisone (PREPARATION H) 1 % cream Apply topically 2 times daily Apply topically 2 times daily.       acetaminophen (TYLENOL) 325 MG tablet Take 650 mg by mouth every 6 hours as needed for Pain      atorvastatin (LIPITOR) 80 MG tablet Take 80 mg by mouth at bedtime      diphenhydrAMINE (BENADRYL) 25 MG capsule Take 25 mg by mouth every 8 hours as needed       docusate sodium (COLACE) 100 MG capsule Take 100 mg by mouth 2 times daily      lactulose encephalopathy 10 GM/15ML SOLN solution Take 20 g by mouth daily as needed       polyethylene glycol (GLYCOLAX) 17 g packet Take 17 g by mouth daily as needed for Constipation      insulin NPH (HUMULIN N;NOVOLIN N) 100 UNIT/ML injection vial Inject into the skin every 6 hours       ipratropium-albuterol (DUONEB) 0.5-2.5 (3) MG/3ML SOLN nebulizer solution Inhale 1 vial into the lungs every 4 hours      furosemide (LASIX) 20 MG tablet Take 20 mg by mouth three times a week [facility]      loperamide (IMODIUM) 2 MG capsule Take 2 mg by mouth 4 times daily as needed for Diarrhea      midodrine (PROAMATINE) 5 MG tablet Take 10 mg by mouth three times a week Tues thurs sat pre dialysis      dextromethorphan-guaiFENesin (MUCINEX DM)  MG per extended release tablet Take 1 tablet by mouth 2 times daily       omeprazole (PRILOSEC) 20 MG delayed release capsule Take 20 mg by mouth daily      chlorhexidine (PERIDEX) 0.12 % solution Take 15 mLs by mouth 2 times daily      sennosides-docusate sodium (SENOKOT-S) 8.6-50 MG tablet Take 2 tablets by mouth at bedtime       sertraline (ZOLOFT) 50 MG tablet Take 50 mg by mouth daily      terazosin (HYTRIN) 1 MG capsule Take 1 mg by mouth nightly      ondansetron (ZOFRAN) 4 MG tablet Take 4 mg by mouth every 8 hours as needed for Nausea or Vomiting          [START ON 5/19/2022] fat emulsion  250 mL IntraVENous Once per day on Mon Thu    epoetin claire-epbx  10,000 Units IntraVENous Once per day on Tue Thu Sat    famotidine (PEPCID) injection  20 mg IntraVENous Daily    metoclopramide  3.5 mg IntraVENous Q6H    metoprolol tartrate  25 mg Oral BID    QUEtiapine  12.5 mg Oral Nightly    pantoprazole  20 mg IntraVENous Daily    sodium chloride flush  5-40 mL IntraVENous 2 times per day    sodium chloride flush  5-40 mL IntraVENous 2 times per day    heparin (porcine)  5,000 Units SubCUTAneous TID    insulin lispro  0-6 Units SubCUTAneous Q4H    heparin (porcine)  1,000 Units IntraVENous Once      PN-Adult Premix 5/20 - Standard Electrolytes - Central Line      fentaNYL 50 mcg/hr (05/11/22 0615)    propofol 10 mcg/kg/min (05/11/22 1229)    phenylephrine (RUSS-SYNEPHRINE) 50mg/250mL infusion Stopped (05/11/22 1230)    sodium chloride      sodium chloride 5 mL/hr at 05/10/22 2209    dextrose       morphine **OR** morphine, metoprolol, HYDROmorphone, LORazepam, sodium chloride, ondansetron **OR** ondansetron, acetaminophen **OR** acetaminophen, sodium chloride flush, sodium chloride, polyethylene glycol, glucose, glucagon (rDNA), dextrose, dextrose bolus **OR** dextrose bolus, diphenhydrAMINE      ALLERGIES:   Allergies as of 05/06/2022 - Fully Reviewed 05/06/2022   Allergen Reaction Noted    Haloperidol lactate  08/02/2018    Ziprasidone hcl  08/02/2018      OBJECTIVE:   height is 5' 4\" (1.626 m) and weight is 258 lb 1.6 oz (117.1 kg). Her temperature is 97.8 °F (36.6 °C). Her blood pressure is 134/50 (abnormal) and her pulse is 78. Her respiration is 20 and oxygen saturation is 100%. No intake/output data recorded. PHYSICAL EXAM:    CONSTITUTIONAL: She is a 80y.o.-year-old who appears her stated age. She is sedated but easily arousable with verbal stimuli. HEENT: PERRLA, EOMI. No scleral icterus. No thrush, atraumatic, normocephalic. NECK: Supple, without cervical or supraclavicular lymphadenopathy: Shiley size 6 proximal XLT tube in good position. CARDIOVASCULAR: S1 S2 RRR.  Without murmer  RESPIRATORY & CHEST: Lungs are clear to auscultation and percussion. No wheezing, no crackles. Good air movement  GASTROINTESTINAL & ABDOMEN: Soft, nontender, positive bowel sounds in all quadrants, non-distended, without hepatosplenomegaly. GENITOURINARY: Deferred. MUSCULOSKELETAL: No tenderness to palpation of the axial skeleton. There is no clubbing. No cyanosis. No edema of the lower extremities. SKIN OF BODY: No rash or jaundice. PSYCHIATRIC EVALUATION: Normal affect. Patient answers questions appropriately. HEMATOLOGIC/LYMPHATIC/ IMMUNOLOGIC: No palpable lymphadenopathy. NEUROLOGIC: Sedated but easily arousable. Moves all extremities.      LABS:  Recent Labs     05/09/22 0457 05/09/22  0458 05/10/22  0522 05/11/22  0505   WBC 10.6  --  7.1 7.5   HGB 8.8*  --  7.9* 7.5*   HCT 30.2*  --  25.4* 24.6*     --  363 411   NA  --  139 141 141   K  --  5.2* 4.8 3.8   CL  --  97* 94* 96*   CREATININE  --  6.5* 7.4* 5.0*   BUN  --  58* 74* 44*   CO2  --  21 26 25       Recent Labs     05/09/22  0458 05/10/22  0522 05/11/22  0505   GLUCOSE 94 102* 110*   CALCIUM 9.1 9.1 8.9    141 141   K 5.2* 4.8 3.8   CO2 21 26 25   CL 97* 94* 96*   BUN 58* 74* 44*   CREATININE 6.5* 7.4* 5.0*       Recent Labs     05/10/22  2036 05/11/22  0620   PHART 7.286* 7.365   ECV8XLR 54.3* 47.7*   PO2ART 457.8* 148.0*   RNE7DFR 25.9 27.2   Q5RQPDEV 100 99.3   BEART -1 1.6       Lab Results   Component Value Date    INR 1.33 (H) 05/01/2022    INR 1.36 (H) 05/01/2022    INR 1.22 (H) 04/30/2022    PROTIME 15.2 (H) 05/01/2022    PROTIME 15.6 (H) 05/01/2022    PROTIME 13.9 (H) 04/30/2022     No results found for: AMYLASE   Lab Results   Component Value Date    LABA1C 5.4 05/06/2022     Lab Results   Component Value Date    .3 05/06/2022     No results found for: TSH, Z6UODUZ, Y7BJOSL, THYROIDAB, FT3, T4FREE  Lab Results   Component Value Date    TROPONINI 0.20 (H) 05/06/2022      No results found for: CRP   No results found for: BNP   Lab Results   Component Value Date    DDIMER 235 (H) 01/10/2022      Lab Results   Component Value Date    FERRITIN 2,153.0 (H) 04/26/2022      Lab Results   Component Value Date    LACTA 1.3 04/30/2022           IMAGING:    Narrative   EXAMINATION:   ONE XRAY VIEW OF THE CHEST       5/10/2022 7:16 pm           FINDINGS:   Tracheostomy in place.  Esophagogastric tube coursing below the diaphragm and   elevated view.  Left internal jugular central venous catheter terminates   within the mid SVC.  No postprocedure pneumothorax           Impression   Left internal jugular central venous catheter terminates within the mid SVC. No postprocedure pneumothorax               IMPRESSION:     1. Small bowel obstruction s/p bowel resection. 2. Chronic respiratory failure on trach/vent  3. Morbid obesity  4. Pulmonary hypertension  5. SILVER  6. End-stage renal disease on hemodialysis  7. History of DVT on chronic anticoagulation        RECOMMENDATION:     1. Patient had presented to the hospital with abdominal pain and found to have small bowel obstruction. Underwent bowel resection. 2. Postop left on full mechanical ventilatory support. 3. This morning her ABG looks close to her baseline. 4. If okay with surgical team, patient can be transitioned to spontaneous breathing mode and then if able to tolerate that, can be transitioned to trach collar. 5. Continue to wean the sedation down. This may help in weaning the vasopressors off.    6. Still has corina drain with minimal output. 7. Patient was given 1 dosage of ciprofloxacin and Flagyl. No leukocytosis or fever seen. Will defer on surgical team decide if there is any need for continuing with the antibiotics. 8. I updated patient's daughter who was present by the bedside. All her questions were answered to her satisfaction. I also discussed the case with hospitalist ITALO.     Total critical care time caring for this patient with life threatening illness, including direct patient contact, management of life support systems, review of data including imaging and labs, discussions with other team members and physicians is at least 32 minutes so far today, excluding procedures. Rashel Cheung MD   Pulmonary Critical Care and Sleep Medicine  [facility] AT [place] [address], [place], [address]  5/6/2022, 1:57 PM      This note was completed using dragon medical speech recognition software. Grammatical errors, random word insertions, pronoun errors and incomplete sentences are occasional consequences of this technology due to software limitations. If there are questions or concerns about the content of this note of information contained within the body of this dictation they should be addressed with the provider for clarification.

## 2022-05-11 NOTE — PROGRESS NOTES
[facility]    5/11/2022 8:30 AM        Name: Han Subramanian . Admitted: 5/6/2022  Primary Care Provider: Cecilia Mendoza MD (Tel: [phone])      Subjective: [place] Pt seen this am with daughter at the bedside  Critical care team ( Dr Mireya Mcintyre) also at the bedside  Plan of care was reviewed in detail. POD # 1 from [address] with VIC for SBO     Pt currently on the vent via her chronic trach.   Sedation is being weaned       Reviewed interval ancillary notes    Current Medications  epoetin claire-epbx (RETACRIT) injection 10,000 Units, Once per day on Tue Thu Sat  chlorhexidine (PERIDEX) 0.12 % solution 15 mL, BID  famotidine (PEPCID) injection 20 mg, Daily  fentaNYL (SUBLIMAZE) 2500 mcg in sodium chloride 0.9% 250 mL premix, Continuous  propofol injection, Continuous  phenylephrine (RUSS-SYNEPHRINE) 50 mg in dextrose 5 % 250 mL infusion, Continuous  morphine (PF) injection 2 mg, Q2H PRN   Or  morphine injection 4 mg, Q2H PRN  metoclopramide (REGLAN) injection 3.5 mg, Q6H  metronidazole (FLAGYL) 500 mg in 0.9% NaCl 100 mL IVPB premix, Q8H  metoprolol tartrate (LOPRESSOR) tablet 25 mg, BID  QUEtiapine (SEROQUEL) tablet 12.5 mg, Nightly  pantoprazole (PROTONIX) injection 20 mg, Daily  metoprolol (LOPRESSOR) injection 5 mg, Q6H PRN  HYDROmorphone HCl PF (DILAUDID) injection 0.5 mg, Q3H PRN  LORazepam (ATIVAN) injection 0.5 mg, Q12H PRN  sodium chloride flush 0.9 % injection 5-40 mL, 2 times per day  0.9 % sodium chloride infusion, PRN  ondansetron (ZOFRAN-ODT) disintegrating tablet 4 mg, Q8H PRN   Or  ondansetron (ZOFRAN) injection 4 mg, Q6H PRN  acetaminophen (TYLENOL) tablet 650 mg, Q6H PRN   Or  acetaminophen (TYLENOL) suppository 650 mg, Q6H PRN  sodium chloride flush 0.9 % injection 5-40 mL, 2 times per day  sodium chloride flush 0.9 % injection 5-40 mL, PRN  0.9 % sodium chloride infusion, PRN  heparin (porcine) injection 5,000 Units, TID  polyethylene glycol (GLYCOLAX) packet 17 g, Daily PRN  glucose (GLUTOSE) 40 % oral gel 15 g, PRN  glucagon (rDNA) injection 1 mg, PRN  dextrose 5 % solution, PRN  insulin lispro (HUMALOG) injection vial 0-6 Units, Q4H  dextrose bolus 10% 125 mL, PRN   Or  dextrose bolus 10% 250 mL, PRN  heparin (porcine) injection 1,000 Units, Once  diphenhydrAMINE (BENADRYL) injection 25 mg, Q6H PRN        Objective:  BP (!) 134/50   Pulse 78   Temp 97.4 °F (36.3 °C)   Resp 20   Ht 5' 4\" (1.626 m)   Wt 258 lb 1.6 oz (117.1 kg)   SpO2 100%   BMI 44.30 kg/m²     Intake/Output Summary (Last 24 hours) at 5/11/2022 0830  Last data filed at 5/11/2022 0615  Gross per 24 hour   Intake 1373.93 ml   Output 3700 ml   Net -2326.07 ml      Wt Readings from Last 3 Encounters:   05/11/22 258 lb 1.6 oz (117.1 kg)   05/01/22 (!) 309 lb 8 oz (140.4 kg)   04/26/22 291 lb (132 kg)       General appearance:  Appears chronically ill and obese. Awakens to light touch   Eyes: Sclera clear. Pupils equal.  ENT: Moist oral mucosa. Trachea midline, no adenopathy. Cardiovascular: Regular rhythm, normal S1, S2. No murmur. No edema in lower extremities  Respiratory: Not using accessory muscles. Vent to trach, lungs with few ronchi upper chest only   GI: Abdomen softly distended and obese with absent bowel sounds. NG to suction with green drainage. abd dressing is clean dry intact.   abd binder is in place    Musculoskeletal: No cyanosis in digits, neck supple  Neurology: moving arms and her head, sedation is being weaned   Psych: sedated   Skin: Warm, dry, poor  Turgor, scratch marks notes on chest and arms     Labs and Tests:  CBC:   Recent Labs     05/09/22  0457 05/10/22  0522 05/11/22  0505   WBC 10.6 7.1 7.5   HGB 8.8* 7.9* 7.5*    363 411     BMP:    Recent Labs     05/09/22  0458 05/10/22  0522 05/11/22  0505    141 141   K 5.2* 4.8 3.8   CL 97* 94* 96*   CO2 21 26 25   BUN 58* 74* 44*   CREATININE 6.5* 7.4* 5.0*   GLUCOSE 94 102* 110*     Hepatic: No results for input(s): AST, ALT, ALB, BILITOT, ALKPHOS in the last 72 hours. 1. Worsening partial small bowel obstruction. Problem List  Principal Problem:    SBO (small bowel obstruction) ([facility])  Active Problems:    Fecal impaction (HCC)    ESRD on dialysis ([facility]    Chronic respiratory failure requiring continuous mechanical ventilation through tracheostomy ([facility]    History of CVA (cerebrovascular accident)    Anemia, chronic disease    Chronic diastolic heart failure ([facility]    Essential hypertension    GERD (gastroesophageal reflux disease)    Morbid obesity with BMI of 50.0-59.9, adult (HCC)    ISLVER (obstructive sleep apnea)  Resolved Problems:    * No resolved hospital problems. *       Assessment & Plan:   1. SBO: POD # 1 from Exp Lap with VIC: GS following,  I clarified  Antibiotics with NP, no current need for any cipro or flagyl   2. PAF;  Now in SR,  On BB   3. ESRD:  On HD q [facility], Saturday,  Nephrology is following  4. Hx of respiratory failure:  S/p trach > 1 year ago. Plan is for her to be weaned off the vent today. She did not require any supplemental oxygen per trach prior to surgery   5. T2DM:  Currently controlled with AIC 5.4 continue with humalog correction as needed   6. Hx of DVT: coumadin on hold , will discuss with surgery when we can possibly added SC heparin  7. Malnutrition: discussed with critical care. Will start TPN   8. Bilateral renal cysts per CT scan:  Needs dedicated MRI of kidney per renal mass protocol to exclude neoplasm   9.  Anemia of chronic disease : on retacrit       Diet: Diet NPO Exceptions are: Ice Chips, Sips of Water with Meds, Sips of Clear Liquids, Popsicles  Code:Full Code  DVT PPX    Chang Jensen, ITALO - CNP   5/11/2022 8:30 AM

## 2022-05-11 NOTE — PROGRESS NOTES
Pt switched to trach collar from trach vent. Currently on fio2 of 40% . Pt tolerating well. Current oxygen saturation on high 90's.

## 2022-05-11 NOTE — ANESTHESIA POSTPROCEDURE EVALUATION
Department of Anesthesiology  Postprocedure Note    Patient: Jose Mendoza  MRN: [id number]  [place]: 1950  Date of evaluation: 5/10/2022  Time:  9:13 PM     Procedure Summary     Date: 05/10/22 Room / Location: [address]    Anesthesia Start: 5311 Anesthesia Stop:     Procedure: EXPLORATORY LAPAROTOMY, LYSIS OF ADHESIONS (N/A Abdomen) Diagnosis: (Small Bowel Obstruction)    Surgeons: Los Moore MD Responsible Provider: Liliam Zimmer MD    Anesthesia Type: general ASA Status: 4          Anesthesia Type: No value filed. Mat Phase I: Mat Score: 8    Mat Phase II:      Last vitals: Reviewed and per EMR flowsheets.        Anesthesia Post Evaluation    Patient location during evaluation: ICU  Patient participation: complete - patient participated  Level of consciousness: sedated and ventilated  Airway patency: patent  Nausea & Vomiting: no vomiting  Complications: no  Cardiovascular status: hemodynamically stable  Respiratory status: acceptable and ventilator  Hydration status: euvolemic  Multimodal analgesia pain management approach

## 2022-05-12 LAB
A/G RATIO: 1 (ref 1.1–2.2)
ABO/RH: NORMAL
ALBUMIN SERPL-MCNC: 3.3 G/DL (ref 3.4–5)
ALP BLD-CCNC: 68 U/L (ref 40–129)
ALT SERPL-CCNC: <5 U/L (ref 10–40)
ANION GAP SERPL CALCULATED.3IONS-SCNC: 17 MMOL/L (ref 3–16)
ANTIBODY SCREEN: NORMAL
AST SERPL-CCNC: <5 U/L (ref 15–37)
BILIRUB SERPL-MCNC: 0.6 MG/DL (ref 0–1)
BLOOD BANK DISPENSE STATUS: NORMAL
BLOOD BANK PRODUCT CODE: NORMAL
BPU ID: NORMAL
BUN BLDV-MCNC: 52 MG/DL (ref 7–20)
CALCIUM SERPL-MCNC: 8.8 MG/DL (ref 8.3–10.6)
CHLORIDE BLD-SCNC: 94 MMOL/L (ref 99–110)
CO2: 26 MMOL/L (ref 21–32)
CREAT SERPL-MCNC: 6.5 MG/DL (ref 0.6–1.2)
DESCRIPTION BLOOD BANK: NORMAL
GFR AFRICAN AMERICAN: 8
GFR NON-AFRICAN AMERICAN: 6
GLUCOSE BLD-MCNC: 170 MG/DL (ref 70–99)
GLUCOSE BLD-MCNC: 174 MG/DL (ref 70–99)
GLUCOSE BLD-MCNC: 183 MG/DL (ref 70–99)
GLUCOSE BLD-MCNC: 188 MG/DL (ref 70–99)
GLUCOSE BLD-MCNC: 198 MG/DL (ref 70–99)
GLUCOSE BLD-MCNC: 204 MG/DL (ref 70–99)
HCT VFR BLD CALC: 22 % (ref 36–48)
HCT VFR BLD CALC: 23.8 % (ref 36–48)
HEMOGLOBIN: 6.8 G/DL (ref 12–16)
HEMOGLOBIN: 7.3 G/DL (ref 12–16)
MAGNESIUM: 1.8 MG/DL (ref 1.8–2.4)
MCH RBC QN AUTO: 29.6 PG (ref 26–34)
MCHC RBC AUTO-ENTMCNC: 30.7 G/DL (ref 31–36)
MCV RBC AUTO: 96.4 FL (ref 80–100)
PDW BLD-RTO: 18.5 % (ref 12.4–15.4)
PERFORMED ON: ABNORMAL
PHOSPHORUS: 4.5 MG/DL (ref 2.5–4.9)
PLATELET # BLD: 313 K/UL (ref 135–450)
PMV BLD AUTO: 7.2 FL (ref 5–10.5)
POTASSIUM SERPL-SCNC: 3.6 MMOL/L (ref 3.5–5.1)
RBC # BLD: 2.29 M/UL (ref 4–5.2)
SODIUM BLD-SCNC: 137 MMOL/L (ref 136–145)
TOTAL PROTEIN: 6.7 G/DL (ref 6.4–8.2)
TRIGL SERPL-MCNC: 126 MG/DL (ref 0–150)
WBC # BLD: 6.2 K/UL (ref 4–11)

## 2022-05-12 PROCEDURE — 6370000000 HC RX 637 (ALT 250 FOR IP): Performed by: INTERNAL MEDICINE

## 2022-05-12 PROCEDURE — 85014 HEMATOCRIT: CPT

## 2022-05-12 PROCEDURE — 6360000002 HC RX W HCPCS: Performed by: SURGERY

## 2022-05-12 PROCEDURE — 6370000000 HC RX 637 (ALT 250 FOR IP): Performed by: SURGERY

## 2022-05-12 PROCEDURE — 86900 BLOOD TYPING SEROLOGIC ABO: CPT

## 2022-05-12 PROCEDURE — 36415 COLL VENOUS BLD VENIPUNCTURE: CPT

## 2022-05-12 PROCEDURE — 2580000003 HC RX 258: Performed by: SURGERY

## 2022-05-12 PROCEDURE — 86901 BLOOD TYPING SEROLOGIC RH(D): CPT

## 2022-05-12 PROCEDURE — 2700000000 HC OXYGEN THERAPY PER DAY

## 2022-05-12 PROCEDURE — 94761 N-INVAS EAR/PLS OXIMETRY MLT: CPT

## 2022-05-12 PROCEDURE — 36592 COLLECT BLOOD FROM PICC: CPT

## 2022-05-12 PROCEDURE — P9016 RBC LEUKOCYTES REDUCED: HCPCS

## 2022-05-12 PROCEDURE — 99024 POSTOP FOLLOW-UP VISIT: CPT | Performed by: SURGERY

## 2022-05-12 PROCEDURE — 84478 ASSAY OF TRIGLYCERIDES: CPT

## 2022-05-12 PROCEDURE — 99233 SBSQ HOSP IP/OBS HIGH 50: CPT | Performed by: INTERNAL MEDICINE

## 2022-05-12 PROCEDURE — 2500000003 HC RX 250 WO HCPCS: Performed by: SURGERY

## 2022-05-12 PROCEDURE — 84100 ASSAY OF PHOSPHORUS: CPT

## 2022-05-12 PROCEDURE — 36430 TRANSFUSION BLD/BLD COMPNT: CPT

## 2022-05-12 PROCEDURE — 85018 HEMOGLOBIN: CPT

## 2022-05-12 PROCEDURE — 83735 ASSAY OF MAGNESIUM: CPT

## 2022-05-12 PROCEDURE — 85027 COMPLETE CBC AUTOMATED: CPT

## 2022-05-12 PROCEDURE — C9113 INJ PANTOPRAZOLE SODIUM, VIA: HCPCS | Performed by: SURGERY

## 2022-05-12 PROCEDURE — 86923 COMPATIBILITY TEST ELECTRIC: CPT

## 2022-05-12 PROCEDURE — 2060000000 HC ICU INTERMEDIATE R&B

## 2022-05-12 PROCEDURE — 86850 RBC ANTIBODY SCREEN: CPT

## 2022-05-12 PROCEDURE — 80053 COMPREHEN METABOLIC PANEL: CPT

## 2022-05-12 PROCEDURE — 2500000003 HC RX 250 WO HCPCS: Performed by: INTERNAL MEDICINE

## 2022-05-12 RX ORDER — INSULIN GLARGINE 100 [IU]/ML
5 INJECTION, SOLUTION SUBCUTANEOUS NIGHTLY
Status: DISCONTINUED | OUTPATIENT
Start: 2022-05-12 | End: 2022-05-13

## 2022-05-12 RX ORDER — INSULIN LISPRO 100 [IU]/ML
0-12 INJECTION, SOLUTION INTRAVENOUS; SUBCUTANEOUS EVERY 4 HOURS
Status: DISCONTINUED | OUTPATIENT
Start: 2022-05-12 | End: 2022-05-14 | Stop reason: DRUGHIGH

## 2022-05-12 RX ORDER — SODIUM CHLORIDE 9 MG/ML
INJECTION, SOLUTION INTRAVENOUS PRN
Status: DISCONTINUED | OUTPATIENT
Start: 2022-05-12 | End: 2022-05-18

## 2022-05-12 RX ADMIN — INSULIN LISPRO 2 UNITS: 100 INJECTION, SOLUTION INTRAVENOUS; SUBCUTANEOUS at 12:46

## 2022-05-12 RX ADMIN — Medication 10 ML: at 23:30

## 2022-05-12 RX ADMIN — INSULIN LISPRO 1 UNITS: 100 INJECTION, SOLUTION INTRAVENOUS; SUBCUTANEOUS at 09:18

## 2022-05-12 RX ADMIN — HEPARIN SODIUM 5000 UNITS: 5000 INJECTION INTRAVENOUS; SUBCUTANEOUS at 23:29

## 2022-05-12 RX ADMIN — QUETIAPINE FUMARATE 12.5 MG: 25 TABLET ORAL at 23:28

## 2022-05-12 RX ADMIN — INSULIN LISPRO 2 UNITS: 100 INJECTION, SOLUTION INTRAVENOUS; SUBCUTANEOUS at 03:45

## 2022-05-12 RX ADMIN — HEPARIN SODIUM 5000 UNITS: 5000 INJECTION INTRAVENOUS; SUBCUTANEOUS at 15:08

## 2022-05-12 RX ADMIN — LEUCINE, PHENYLALANINE, LYSINE, METHIONINE, ISOLEUCINE, VALINE, HISTIDINE, THREONINE, TRYPTOPHAN, ALANINE, GLYCINE, ARGININE, PROLINE, SERINE, TYROSINE, SODIUM ACETATE, DIBASIC POTASSIUM PHOSPHATE, MAGNESIUM CHLORIDE, SODIUM CHLORIDE, CALCIUM CHLORIDE, DEXTROSE
365; 280; 290; 200; 300; 290; 240; 210; 90; 1035; 515; 575; 340; 250; 20; 340; 261; 51; 59; 33; 20 INJECTION INTRAVENOUS at 20:57

## 2022-05-12 RX ADMIN — MORPHINE SULFATE 2 MG: 2 INJECTION, SOLUTION INTRAMUSCULAR; INTRAVENOUS at 15:08

## 2022-05-12 RX ADMIN — Medication 10 ML: at 09:18

## 2022-05-12 RX ADMIN — HYDROMORPHONE HYDROCHLORIDE 0.5 MG: 1 INJECTION, SOLUTION INTRAMUSCULAR; INTRAVENOUS; SUBCUTANEOUS at 12:47

## 2022-05-12 RX ADMIN — LORAZEPAM 0.5 MG: 2 INJECTION INTRAMUSCULAR; INTRAVENOUS at 00:05

## 2022-05-12 RX ADMIN — LORAZEPAM 0.5 MG: 2 INJECTION INTRAMUSCULAR; INTRAVENOUS at 23:28

## 2022-05-12 RX ADMIN — INSULIN GLARGINE 5 UNITS: 100 INJECTION, SOLUTION SUBCUTANEOUS at 23:29

## 2022-05-12 RX ADMIN — PANTOPRAZOLE SODIUM 20 MG: 40 INJECTION, POWDER, FOR SOLUTION INTRAVENOUS at 09:17

## 2022-05-12 RX ADMIN — METOPROLOL TARTRATE 25 MG: 25 TABLET, FILM COATED ORAL at 23:28

## 2022-05-12 RX ADMIN — DIPHENHYDRAMINE HYDROCHLORIDE 25 MG: 50 INJECTION, SOLUTION INTRAMUSCULAR; INTRAVENOUS at 03:15

## 2022-05-12 RX ADMIN — INSULIN LISPRO 2 UNITS: 100 INJECTION, SOLUTION INTRAVENOUS; SUBCUTANEOUS at 23:43

## 2022-05-12 RX ADMIN — FAMOTIDINE 20 MG: 10 INJECTION, SOLUTION INTRAVENOUS at 09:18

## 2022-05-12 RX ADMIN — HEPARIN SODIUM 5000 UNITS: 5000 INJECTION INTRAVENOUS; SUBCUTANEOUS at 09:17

## 2022-05-12 RX ADMIN — Medication 10 ML: at 09:45

## 2022-05-12 RX ADMIN — MORPHINE SULFATE 4 MG: 4 INJECTION INTRAVENOUS at 03:53

## 2022-05-12 RX ADMIN — INSULIN LISPRO 2 UNITS: 100 INJECTION, SOLUTION INTRAVENOUS; SUBCUTANEOUS at 17:28

## 2022-05-12 RX ADMIN — HYDROMORPHONE HYDROCHLORIDE 0.5 MG: 1 INJECTION, SOLUTION INTRAMUSCULAR; INTRAVENOUS; SUBCUTANEOUS at 01:52

## 2022-05-12 ASSESSMENT — PAIN DESCRIPTION - DESCRIPTORS
DESCRIPTORS: ACHING
DESCRIPTORS: ACHING

## 2022-05-12 ASSESSMENT — PAIN SCALES - GENERAL
PAINLEVEL_OUTOF10: 0
PAINLEVEL_OUTOF10: 0
PAINLEVEL_OUTOF10: 6
PAINLEVEL_OUTOF10: 0
PAINLEVEL_OUTOF10: 10
PAINLEVEL_OUTOF10: 10
PAINLEVEL_OUTOF10: 7
PAINLEVEL_OUTOF10: 0
PAINLEVEL_OUTOF10: 3
PAINLEVEL_OUTOF10: 0

## 2022-05-12 ASSESSMENT — PAIN DESCRIPTION - ORIENTATION
ORIENTATION: MID
ORIENTATION: MID

## 2022-05-12 ASSESSMENT — PAIN DESCRIPTION - LOCATION
LOCATION: ABDOMEN
LOCATION: ABDOMEN

## 2022-05-12 ASSESSMENT — PAIN DESCRIPTION - INTENSITY
RATING_2: 0

## 2022-05-12 NOTE — PROGRESS NOTES
[facility] PROGRESS NOTE    5/12/2022 7:49 AM        Name: Luke Godinez . Admitted: 5/6/2022  Primary Care Provider: Shelby Parks MD (Tel: [phone])      Subjective:  .     HGB < 7   1 units PRBC ordered this am    Pt seen this am , she was awake and alert , pulling at her NG tube, requests a pop scicle   Reports adequate pain control this am  Was liberated from the vent on POD # 1 ( 5/11/22)     POD # 2 from [address] with VIC for SBO     Reviewed interval ancillary notes    Current Medications  0.9 % sodium chloride infusion, PRN  [START ON 5/19/2022] fat emulsion 20 % infusion 250 mL, Once per day on Mon Thu  PN-Adult Premix 5/20 - Standard Electrolytes - Central Line, Continuous TPN  epoetin claire-epbx (RETACRIT) injection 10,000 Units, Once per day on Tue Thu Sat  famotidine (PEPCID) injection 20 mg, Daily  fentaNYL (SUBLIMAZE) 2500 mcg in sodium chloride 0.9% 250 mL premix, Continuous  propofol injection, Continuous  phenylephrine (RUSS-SYNEPHRINE) 50 mg in dextrose 5 % 250 mL infusion, Continuous  morphine (PF) injection 2 mg, Q2H PRN   Or  morphine injection 4 mg, Q2H PRN  metoprolol tartrate (LOPRESSOR) tablet 25 mg, BID  QUEtiapine (SEROQUEL) tablet 12.5 mg, Nightly  pantoprazole (PROTONIX) injection 20 mg, Daily  metoprolol (LOPRESSOR) injection 5 mg, Q6H PRN  HYDROmorphone HCl PF (DILAUDID) injection 0.5 mg, Q3H PRN  LORazepam (ATIVAN) injection 0.5 mg, Q12H PRN  sodium chloride flush 0.9 % injection 5-40 mL, 2 times per day  0.9 % sodium chloride infusion, PRN  ondansetron (ZOFRAN-ODT) disintegrating tablet 4 mg, Q8H PRN   Or  ondansetron (ZOFRAN) injection 4 mg, Q6H PRN  acetaminophen (TYLENOL) tablet 650 mg, Q6H PRN   Or  acetaminophen (TYLENOL) suppository 650 mg, Q6H PRN  sodium chloride flush 0.9 % injection 5-40 mL, 2 times per day  sodium chloride flush 0.9 % injection 5-40 mL, PRN  0.9 % sodium chloride infusion, PRN  heparin (porcine) injection 5,000 Units, TID  polyethylene glycol (GLYCOLAX) packet 17 g, Daily PRN  glucose (GLUTOSE) 40 % oral gel 15 g, PRN  glucagon (rDNA) injection 1 mg, PRN  dextrose 5 % solution, PRN  insulin lispro (HUMALOG) injection vial 0-6 Units, Q4H  dextrose bolus 10% 125 mL, PRN   Or  dextrose bolus 10% 250 mL, PRN  heparin (porcine) injection 1,000 Units, Once  diphenhydrAMINE (BENADRYL) injection 25 mg, Q6H PRN        Objective:  BP (!) 102/51   Pulse 86   Temp 98.4 °F (36.9 °C) (Temporal)   Resp 21   Ht 5' 4\" (1.626 m)   Wt 256 lb 8 oz (116.3 kg)   SpO2 100%   BMI 44.03 kg/m²     Intake/Output Summary (Last 24 hours) at 5/12/2022 0749  Last data filed at 5/12/2022 3351  Gross per 24 hour   Intake 839.77 ml   Output 1100 ml   Net -260.23 ml      Wt Readings from Last 3 Encounters:   05/12/22 256 lb 8 oz (116.3 kg)   05/01/22 (!) 309 lb 8 oz (140.4 kg)   04/26/22 291 lb (132 kg)       General appearance:  Appears chronically ill and obese. Awake, she is alert and pleasant this am   Eyes: Sclera clear. Pupils equal.  ENT: Moist oral mucosa. Trachea midline, no adenopathy. Cardiovascular: Regular rhythm, normal S1, S2. No murmur. No edema in lower extremities  Respiratory: Not using accessory muscles. trach collar in place with 8 liters of oxygen  , no ronchi or wheezing. Decreased in bases   GI: Abdomen softly distended and obese with absent bowel sounds. NG to suction with green drainage. abd dressing is clean dry intact.   abd binder is in place    Musculoskeletal: No cyanosis in digits, neck supple  Neurology: moving arms and her head, sedation is being weaned   Psych: sedated   Skin: Warm, dry, poor  Turgor, scratch marks notes on chest and arms     Labs and Tests:  CBC:   Recent Labs     05/10/22  0522 05/11/22  0505 05/12/22  0337   WBC 7.1 7.5 6.2   HGB 7.9* 7.5* 6.8*    411 313     BMP:    Recent Labs     05/10/22  0522 05/11/22  9007 05/12/22  0337    141 137   K 4.8 3.8 3.6   CL 94* 96* 94*   CO2 26 25 26   BUN 74* 44* 52*   CREATININE 7.4* 5.0* 6.5*   GLUCOSE 102* 110* 183*     Hepatic:   Recent Labs     05/12/22  0337   AST <5*   ALT <5*   BILITOT 0.6   ALKPHOS 68       1. Worsening partial small bowel obstruction. Problem List  Principal Problem:    SBO (small bowel obstruction) ([facility])  Active Problems:    Fecal impaction (HCC)    ESRD on dialysis ([facility]    Chronic respiratory failure requiring continuous mechanical ventilation through tracheostomy ([facility]    History of CVA (cerebrovascular accident)    Anemia, chronic disease    Chronic diastolic heart failure ([facility]    Essential hypertension    GERD (gastroesophageal reflux disease)    Morbid obesity with BMI of 50.0-59.9, adult ([facility])    SILVER (obstructive sleep apnea)  Resolved Problems:    * No resolved hospital problems. *       Assessment & Plan:   1. SBO: POD # 2 from Exp Lap with VIC: GS following,  I clarified  Antibiotics with NP, no current need for any cipro or flagyl   2. PAF;  Now in SR,  On BB   3. ESRD:  On HD q [facility], Saturday,  Nephrology is following  4. Hx of respiratory failure:  S/p trach > 1 year ago. She has been extubated, currently on 8 liters   5. T2DM:  Currently controlled with AIC 5.4 continue with humalog correction as needed   6. Hx of DVT: coumadin on hold , will discuss with surgery when we can possibly added SC heparin  7. Malnutrition: TPN started 5/11  8. Bilateral renal cysts per CT scan:  Needs dedicated MRI of kidney per renal mass protocol to exclude neoplasm   9.  Acute on chronic anemia:  on retacrit , Hgb < 7 , give 1 unit PRBC       Diet: Diet NPO Exceptions are: Ice Chips, Sips of Water with Meds, Sips of Clear Liquids, Popsicles  PN-Adult Premix 5/20 - Standard Electrolytes - Central Line  Code:Full Code  DVT PPX    ITALO Andrew - CNP   5/12/2022 7:49 AM

## 2022-05-12 NOTE — PROGRESS NOTES
Pt confused and agitated at this time. Pulling at tubes, dressings, leads and b/p cuff. Pt attempting to bite RN when trying to fix NG sticker to nose. Pt asked to call her daughter. Daughter, Kel Fernandez confirms pt does have confusion at times, but is really confused now. Ativan given per MD order for agitation. Will reassess.

## 2022-05-12 NOTE — PROGRESS NOTES
PULMONARY AND CRITICAL CARE MEDICINE PROGRESS NOTE    Subjective: Patient's clinical status continues to stabilize. Has been transitioned to trach collar. Tolerating it well. Will undergo hemodialysis today. H&H remained stable. Patient is hemodynamically stable. Off of all vasopressors. REVIEW OF SYSTEMS:     Unable to obtain as patient is currently on full mechanical ventilatory support.     PAST MEDICAL HISTORY:   Past Medical History:   Diagnosis Date    Acute and chronic respiratory failure (acute-on-chronic) ([facility])     Acute blood loss anemia 7/1/2020    Acute deep vein thrombosis (DVT) of brachial vein of left upper extremity ([address] 2/20/2019    Formatting of this note might be different from the original. Dx February 2019    Anxiety disorder     Atherosclerotic heart disease of native coronary artery without angina pectoris     Bleeding hemorrhoids 6/29/2020    Cerebellar infarct ([address] 9/29/2019    Cerebral infarction ([address]     Chronic pain syndrome     Dependence on renal dialysis ([address]     Dependence on respirator ([facility])     End stage renal disease ([address]     Gastro-esophageal reflux disease with esophagitis, without bleeding     Insomnia     Major depressive disorder, recurrent ([address]     Morbid obesity due to excess calories ([address]     Muscle weakness     Obstructive sleep apnea (adult) (pediatric)     Other hyperlipidemia     Other voice and resonance disorders     Personal history of COVID-19     Pulmonary hypertension ([address]     Tracheostomy status ([address]     Type 2 diabetes mellitus without complications ([address]     Unspecified atrial fibrillation ([address]        PAST SURGICAL HISTORY:   Past Surgical History:   Procedure Laterality Date    COLECTOMY N/A 5/10/2022    EXPLORATORY LAPAROTOMY, LYSIS OF ADHESIONS performed by Lavell Garcia MD at [facility]:   Social History     Tobacco Use    Smoking status: Former Smoker    Smokeless tobacco: Never Used   Substance Use Topics    Alcohol use: Never    Drug use: Never       FAMILY HISTORY: History reviewed. No pertinent family history. MEDICATIONS:     No current facility-administered medications on file prior to encounter. Current Outpatient Medications on File Prior to Encounter   Medication Sig Dispense Refill    warfarin (COUMADIN) 3 MG tablet Take 1 tablet by mouth nightly 30 tablet 2    hydrocortisone (ANUSOL-HC) 2.5 % CREA rectal cream Place rectally 2 times daily 1 each 0    metoprolol succinate (TOPROL XL) 25 MG extended release tablet Take 0.5 tablets by mouth daily 30 tablet 3    insulin glargine (LANTUS) 100 UNIT/ML injection vial Inject 15 Units into the skin daily      aspirin 81 MG EC tablet Take 81 mg by mouth daily      cyanocobalamin 1000 MCG tablet Take 500 mcg by mouth daily      QUEtiapine (SEROQUEL) 25 MG tablet Take 12.5 mg by mouth nightly      sodium polystyrene (KAYEXALATE) 15 GM/60ML suspension Take 15 g by mouth 4 times daily Sun, Mon, Wed, Fri      calcium acetate 667 MG TABS Take 1,334 mg by mouth 3 times daily (with meals)      hydrocortisone (PREPARATION H) 1 % cream Apply topically 2 times daily Apply topically 2 times daily.       acetaminophen (TYLENOL) 325 MG tablet Take 650 mg by mouth every 6 hours as needed for Pain      atorvastatin (LIPITOR) 80 MG tablet Take 80 mg by mouth at bedtime      diphenhydrAMINE (BENADRYL) 25 MG capsule Take 25 mg by mouth every 8 hours as needed       docusate sodium (COLACE) 100 MG capsule Take 100 mg by mouth 2 times daily      lactulose encephalopathy 10 GM/15ML SOLN solution Take 20 g by mouth daily as needed       polyethylene glycol (GLYCOLAX) 17 g packet Take 17 g by mouth daily as needed for Constipation      insulin NPH (HUMULIN N;NOVOLIN N) 100 UNIT/ML injection vial Inject into the skin every 6 hours       ipratropium-albuterol (DUONEB) 0.5-2.5 (3) MG/3ML SOLN nebulizer solution Inhale 1 vial into the lungs every 4 hours      furosemide (LASIX) 20 MG tablet Take 20 mg by mouth three times a week [facility]      loperamide (IMODIUM) 2 MG capsule Take 2 mg by mouth 4 times daily as needed for Diarrhea      midodrine (PROAMATINE) 5 MG tablet Take 10 mg by mouth three times a week Tues thurs sat pre dialysis      dextromethorphan-guaiFENesin (MUCINEX DM)  MG per extended release tablet Take 1 tablet by mouth 2 times daily       omeprazole (PRILOSEC) 20 MG delayed release capsule Take 20 mg by mouth daily      chlorhexidine (PERIDEX) 0.12 % solution Take 15 mLs by mouth 2 times daily      sennosides-docusate sodium (SENOKOT-S) 8.6-50 MG tablet Take 2 tablets by mouth at bedtime       sertraline (ZOLOFT) 50 MG tablet Take 50 mg by mouth daily      terazosin (HYTRIN) 1 MG capsule Take 1 mg by mouth nightly      ondansetron (ZOFRAN) 4 MG tablet Take 4 mg by mouth every 8 hours as needed for Nausea or Vomiting          insulin lispro  0-12 Units SubCUTAneous Q4H    insulin glargine  5 Units SubCUTAneous Nightly    [START ON 5/19/2022] fat emulsion  250 mL IntraVENous Once per day on Mon Thu    epoetin claire-epbx  10,000 Units IntraVENous Once per day on Tue Thu Sat    famotidine (PEPCID) injection  20 mg IntraVENous Daily    metoprolol tartrate  25 mg Oral BID    QUEtiapine  12.5 mg Oral Nightly    pantoprazole  20 mg IntraVENous Daily    sodium chloride flush  5-40 mL IntraVENous 2 times per day    sodium chloride flush  5-40 mL IntraVENous 2 times per day    heparin (porcine)  5,000 Units SubCUTAneous TID    heparin (porcine)  1,000 Units IntraVENous Once      sodium chloride      PN-Adult Premix 5/20 - Standard Electrolytes - Central Line      PN-Adult Premix 5/20 - Standard Electrolytes - Central Line 40 mL/hr at 05/12/22 0605    sodium chloride      sodium chloride 5 mL/hr at 05/12/22 0605    dextrose       sodium chloride, morphine **OR** morphine, metoprolol, HYDROmorphone, LORazepam, sodium chloride, ondansetron **OR** ondansetron, acetaminophen **OR** acetaminophen, sodium chloride flush, sodium chloride, polyethylene glycol, glucose, glucagon (rDNA), dextrose, dextrose bolus **OR** dextrose bolus, diphenhydrAMINE      ALLERGIES:   Allergies as of 05/06/2022 - Fully Reviewed 05/06/2022   Allergen Reaction Noted    Haloperidol lactate  08/02/2018    Ziprasidone hcl  08/02/2018      OBJECTIVE:   height is 5' 4\" (1.626 m) and weight is 256 lb 8 oz (116.3 kg). Her temporal temperature is 98 °F (36.7 °C). Her blood pressure is 111/57 (abnormal) and her pulse is 88. Her respiration is 17 and oxygen saturation is 100%. I/O this shift: In: 12 [Blood:406]  Out: -      PHYSICAL EXAM:    CONSTITUTIONAL: She is a 80y.o.-year-old who appears her stated age. She is awake and alert. HEENT: PERRLA, EOMI. No scleral icterus. No thrush, atraumatic, normocephalic. NECK: Supple, without cervical or supraclavicular lymphadenopathy: Shiley size 6 proximal XLT tube in good position. CARDIOVASCULAR: S1 S2 RRR. Without murmer  RESPIRATORY & CHEST: Lungs are clear to auscultation and percussion. No wheezing, no crackles. Good air movement  GASTROINTESTINAL & ABDOMEN: Soft, nontender, positive bowel sounds in all quadrants, non-distended, without hepatosplenomegaly. GENITOURINARY: Deferred. MUSCULOSKELETAL: No tenderness to palpation of the axial skeleton. There is no clubbing. No cyanosis. No edema of the lower extremities. SKIN OF BODY: No rash or jaundice. PSYCHIATRIC EVALUATION: Normal affect. Patient answers questions appropriately. HEMATOLOGIC/LYMPHATIC/ IMMUNOLOGIC: No palpable lymphadenopathy. NEUROLOGIC: Awake and alert. Moving all her extremities.      LABS:  Recent Labs     05/10/22  0522 05/10/22  0522 05/11/22  0505 05/12/22  0337 05/12/22  1515   WBC 7.1  --  7.5 6.2  --    HGB 7.9*   < > 7.5* 6.8* 7.3*   HCT 25.4*   < > 24.6* 22.0* 23.8*     --  411 313  --    TRIG  --   --   --  126 --    ALT  --   --   --  <5*  --    AST  --   --   --  <5*  --      --  141 137  --    K 4.8  --  3.8 3.6  --    CL 94*  --  96* 94*  --    CREATININE 7.4*  --  5.0* 6.5*  --    BUN 74*  --  44* 52*  --    CO2 26  --  25 26  --     < > = values in this interval not displayed. Recent Labs     05/10/22  0522 05/11/22  0505 05/12/22  0337   GLUCOSE 102* 110* 183*   CALCIUM 9.1 8.9 8.8    141 137   K 4.8 3.8 3.6   CO2 26 25 26   CL 94* 96* 94*   BUN 74* 44* 52*   CREATININE 7.4* 5.0* 6.5*       Recent Labs     05/10/22  2036 05/11/22  0620   PHART 7.286* 7.365   QCP8HMX 54.3* 47.7*   PO2ART 457.8* 148.0*   EEY6QWS 25.9 27.2   E8UUNUJQ 100 99.3   BEART -1 1.6       Lab Results   Component Value Date    INR 1.33 (H) 05/01/2022    INR 1.36 (H) 05/01/2022    INR 1.22 (H) 04/30/2022    PROTIME 15.2 (H) 05/01/2022    PROTIME 15.6 (H) 05/01/2022    PROTIME 13.9 (H) 04/30/2022     No results found for: AMYLASE   Lab Results   Component Value Date    LABA1C 5.4 05/06/2022     Lab Results   Component Value Date    .3 05/06/2022     No results found for: TSH, G4LIYHL, Q0PAGMA, THYROIDAB, FT3, T4FREE  Lab Results   Component Value Date    TROPONINI 0.20 (H) 05/06/2022      No results found for: CRP   No results found for: BNP   Lab Results   Component Value Date    DDIMER 235 (H) 01/10/2022      Lab Results   Component Value Date    FERRITIN 2,153.0 (H) 04/26/2022      Lab Results   Component Value Date    LACTA 1.3 04/30/2022           IMAGING:    Narrative   EXAMINATION:   ONE XRAY VIEW OF THE CHEST       5/10/2022 7:16 pm           FINDINGS:   Tracheostomy in place.  Esophagogastric tube coursing below the diaphragm and   elevated view.  Left internal jugular central venous catheter terminates   within the mid SVC.  No postprocedure pneumothorax           Impression   Left internal jugular central venous catheter terminates within the mid SVC.    No postprocedure pneumothorax               IMPRESSION: 1. Small bowel obstruction s/p bowel resection. 2. Chronic respiratory failure on trach/vent  3. Morbid obesity  4. Pulmonary hypertension  5. SILVER  6. End-stage renal disease on hemodialysis  7. History of DVT on chronic anticoagulation        RECOMMENDATION:     1. Patient had presented to the hospital with abdominal pain and found to have small bowel obstruction. Underwent bowel resection. 2. Postop left on full mechanical ventilatory support. Now transition to trach collar. Tolerating it well. This is her baseline. If respiratory distress seen can be given ventilatory support as needed. 3. General surgery on board. Will follow the recommendations. 4. Still has corina drain with minimal output. 5. All antibiotics stopped. 6. Nothing further to add from critical care standpoint. Will sign off. Jc Chahal MD   Pulmonary Critical Care and Sleep Medicine  [facility]   [address] [facility], [employer], [place]  5/6/2022, 4:36 PM      This note was completed using dragon medical speech recognition software. Grammatical errors, random word insertions, pronoun errors and incomplete sentences are occasional consequences of this technology due to software limitations. If there are questions or concerns about the content of this note of information contained within the body of this dictation they should be addressed with the provider for clarification.

## 2022-05-12 NOTE — PLAN OF CARE
Problem: Discharge Planning  Goal: Discharge to home or other facility with appropriate resources  5/12/2022 0033 by Branden Alvarado RN  Outcome: Progressing  5/12/2022 0033 by Branden Alvarado RN  Outcome: Progressing  Flowsheets  Taken 5/12/2022 0000  Discharge to home or other facility with appropriate resources: Identify barriers to discharge with patient and caregiver  Taken 5/11/2022 2000  Discharge to home or other facility with appropriate resources: Identify barriers to discharge with patient and caregiver     Problem: Pain  Goal: Verbalizes/displays adequate comfort level or baseline comfort level  5/12/2022 0033 by Branden Alvarado RN  Outcome: Progressing  5/12/2022 0033 by Branden Alvarado RN  Outcome: Progressing  Flowsheets  Taken 5/12/2022 0000  Verbalizes/displays adequate comfort level or baseline comfort level: Assess pain using appropriate pain scale  Taken 5/11/2022 2200  Verbalizes/displays adequate comfort level or baseline comfort level: Assess pain using appropriate pain scale  Taken 5/11/2022 2000  Verbalizes/displays adequate comfort level or baseline comfort level: Assess pain using appropriate pain scale     Problem: Skin/Tissue Integrity  Goal: Absence of new skin breakdown  Description: 1. Monitor for areas of redness and/or skin breakdown  2. Assess vascular access sites hourly  3. Every 4-6 hours minimum:  Change oxygen saturation probe site  4. Every 4-6 hours:  If on nasal continuous positive airway pressure, respiratory therapy assess nares and determine need for appliance change or resting period.   5/12/2022 0033 by Branden Alvarado RN  Outcome: Progressing  5/12/2022 0033 by Branden Alvarado RN  Outcome: Progressing     Problem: Safety - Adult  Goal: Free from fall injury  5/12/2022 0033 by Branden Alvarado RN  Outcome: Progressing  5/12/2022 0033 by Branden Alvarado RN  Outcome: Progressing     Problem: ABCDS Injury Assessment  Goal: Absence of physical injury  5/12/2022 0033 by Branden Alvarado RN  Outcome: Progressing  5/12/2022 4279 by Domingo Snellen, RN  Outcome: Progressing     Problem: Neurosensory - Adult  Goal: Achieves maximal functionality and self care  5/12/2022 0033 by Domingo Snellen, RN  Outcome: Progressing  5/12/2022 0033 by Domingo Snellen, RN  Outcome: Progressing     Problem: Respiratory - Adult  Goal: Achieves optimal ventilation and oxygenation  5/12/2022 0033 by Domingo Snellen, RN  Outcome: Progressing  5/12/2022 0033 by Domingo Snellen, RN  Outcome: Progressing     Problem: Cardiovascular - Adult  Goal: Maintains optimal cardiac output and hemodynamic stability  5/12/2022 0033 by Domingo Snellen, RN  Outcome: Progressing  5/12/2022 0033 by Domingo Snellen, RN  Outcome: Progressing  Flowsheets  Taken 5/12/2022 0000  Maintains optimal cardiac output and hemodynamic stability: Monitor blood pressure and heart rate  Taken 5/11/2022 2000  Maintains optimal cardiac output and hemodynamic stability: Monitor blood pressure and heart rate  Goal: Absence of cardiac dysrhythmias or at baseline  5/12/2022 0033 by Domingo Snellen, RN  Outcome: Progressing  5/12/2022 0033 by Domingo Snellen, RN  Outcome: Progressing  Flowsheets  Taken 5/12/2022 0000  Absence of cardiac dysrhythmias or at baseline: Monitor cardiac rate and rhythm  Taken 5/11/2022 2000  Absence of cardiac dysrhythmias or at baseline: Monitor cardiac rate and rhythm     Problem: Skin/Tissue Integrity - Adult  Goal: Skin integrity remains intact  5/12/2022 0033 by Domingo Snellen, RN  Outcome: Progressing  5/12/2022 0033 by Domingo Snellen, RN  Outcome: Progressing  Flowsheets  Taken 5/12/2022 0000  Skin Integrity Remains Intact: Monitor for areas of redness and/or skin breakdown  Taken 5/11/2022 2000  Skin Integrity Remains Intact: Monitor for areas of redness and/or skin breakdown     Problem: Musculoskeletal - Adult  Goal: Return mobility to safest level of function  5/12/2022 0033 by Domingo Snellen, RN  Outcome: Progressing  5/12/2022 0033 by Domingo Snellen, RN  Outcome: Progressing     Problem: Gastrointestinal - Adult  Goal: Minimal or absence of nausea and vomiting  5/12/2022 0033 by Rogelio Mackey RN  Outcome: Progressing  5/12/2022 0033 by Rogelio Mackey RN  Outcome: Progressing  Goal: Maintains or returns to baseline bowel function  5/12/2022 0033 by Rogelio Mackey RN  Outcome: Progressing  5/12/2022 0033 by Rogelio Mackey RN  Outcome: Progressing  Goal: Maintains adequate nutritional intake  5/12/2022 0033 by Rogelio Mackey RN  Outcome: Progressing  5/12/2022 0033 by Rogelio Mackey RN  Outcome: Progressing     Problem: Genitourinary - Adult  Goal: Urinary catheter remains patent  5/12/2022 0033 by Rogelio Mackey RN  Outcome: Progressing  5/12/2022 0033 by Rogelio Mackey RN  Outcome: Progressing     Problem: Infection - Adult  Goal: Absence of infection at discharge  5/12/2022 0033 by Rogelio Mackey RN  Outcome: Progressing  5/12/2022 0033 by Rogelio Mackey RN  Outcome: Progressing  Flowsheets  Taken 5/12/2022 0000  Absence of infection at discharge: Assess and monitor for signs and symptoms of infection  Taken 5/11/2022 2000  Absence of infection at discharge: Assess and monitor for signs and symptoms of infection     Problem: Metabolic/Fluid and Electrolytes - Adult  Goal: Electrolytes maintained within normal limits  5/12/2022 0033 by Rogelio Mackey RN  Outcome: Progressing  5/12/2022 0033 by Rogelio Mackey RN  Outcome: Progressing  Flowsheets  Taken 5/12/2022 0000  Electrolytes maintained within normal limits: Monitor labs and assess patient for signs and symptoms of electrolyte imbalances  Taken 5/11/2022 2000  Electrolytes maintained within normal limits: Monitor labs and assess patient for signs and symptoms of electrolyte imbalances  Goal: Hemodynamic stability and optimal renal function maintained  5/12/2022 0033 by Rogelio Mackey RN  Outcome: Progressing  5/12/2022 0033 by Rogelio Mackey RN  Outcome: Progressing  Flowsheets  Taken 5/12/2022 0000  Hemodynamic stability and optimal renal function maintained: Monitor labs and assess for signs and symptoms of volume excess or deficit  Taken 5/11/2022 2000  Hemodynamic stability and optimal renal function maintained: Monitor labs and assess for signs and symptoms of volume excess or deficit  Goal: Glucose maintained within prescribed range  5/12/2022 0033 by Kvng Mac RN  Outcome: Progressing  5/12/2022 0033 by Kvng Mac RN  Outcome: Progressing  Flowsheets  Taken 5/12/2022 0000  Glucose maintained within prescribed range: Monitor blood glucose as ordered  Taken 5/11/2022 2000  Glucose maintained within prescribed range: Monitor blood glucose as ordered     Problem: Hematologic - Adult  Goal: Maintains hematologic stability  5/12/2022 0033 by Kvng Mac RN  Outcome: Progressing  5/12/2022 0033 by Kvng Mac RN  Outcome: Progressing  Flowsheets  Taken 5/12/2022 0000  Maintains hematologic stability: Assess for signs and symptoms of bleeding or hemorrhage  Taken 5/11/2022 2000  Maintains hematologic stability: Assess for signs and symptoms of bleeding or hemorrhage     Problem: Nutrition Deficit:  Goal: Optimize nutritional status  5/12/2022 0033 by Kvng Mac RN  Outcome: Progressing  5/12/2022 0033 by Kvng Mac RN  Outcome: Progressing  Flowsheets (Taken 5/11/2022 1134 by Tammy Dunbar, RD, LD)  Nutrient intake appropriate for improving, restoring, or maintaining nutritional needs: Recommend, monitor, and adjust tube feedings and TPN/PPN based on assessed needs     Problem: Chronic Conditions and Co-morbidities  Goal: Patient's chronic conditions and co-morbidity symptoms are monitored and maintained or improved  5/12/2022 0033 by Kvng Mac RN  Outcome: Progressing  5/12/2022 0033 by Kvng Mac RN  Outcome: Progressing  Flowsheets  Taken 5/12/2022 0000  Care Plan - Patient's Chronic Conditions and Co-Morbidity Symptoms are Monitored and Maintained or Improved: Collaborate with multidisciplinary team to address chronic and comorbid conditions and prevent exacerbation or deterioration  Taken 5/11/2022 44 Armando Bullard - Patient's Chronic Conditions and Co-Morbidity Symptoms are Monitored and Maintained or Improved: Collaborate with multidisciplinary team to address chronic and comorbid conditions and prevent exacerbation or deterioration

## 2022-05-12 NOTE — PROGRESS NOTES
Clinical Pharmacy Note    Pharmacy consulted by Dr. Evangelist Fair to manage TPN    Current TPN rate: 40 ml/hr  Goal TPN rate: 83 ml/hr    Labs:  General Labs:  BMP:    Lab Results   Component Value Date     05/12/2022    K 3.6 05/12/2022    K 4.7 05/06/2022    CL 94 05/12/2022    CO2 26 05/12/2022    BUN 52 05/12/2022    LABALBU 3.3 05/12/2022    CREATININE 6.5 05/12/2022    CALCIUM 8.8 05/12/2022    GFRAA 8 05/12/2022    LABGLOM 6 05/12/2022    GLUCOSE 183 05/12/2022     Magnesium:    Lab Results   Component Value Date    MG 1.80 05/12/2022     Phosphorus:    Lab Results   Component Value Date    PHOS 4.5 05/12/2022     Blood sugars: 172-204    Blood sugar management:  Plan to increase q4 hour Humalog to medium dose sliding scale. Will add 5 units of Lantus to start tonight. Plan to increase TPN to goal - 83 ml/hr. Thank you for allowing pharmacy to participate in the care of this patient.     iRchard Mejia, [facility], PharmD 5/12/2022

## 2022-05-12 NOTE — PROGRESS NOTES
[facility]        Progress Note    Pt Name: Hank Wahl  MRN: [id number]  [place]: 1950  Date of evaluation: 5/12/2022    Subjective:    No acute events overnight, awake and talking  Remains off ventilator  Pressors remain off  Pain controlled  No nausea or vomiting, NGT in place  No stool out last night  Resting in bed at this time    Postoperative Day # 2      Vital Signs:  Patient Vitals for the past 24 hrs:   BP Temp Temp src Pulse Resp SpO2 Weight   05/12/22 1044 109/65 -- -- 87 -- -- --   05/12/22 0845 (!) 98/54 97.3 °F (36.3 °C) Temporal 90 17 100 % --   05/12/22 0843 (!) 105/53 97.3 °F (36.3 °C) Temporal 90 17 100 % --   05/12/22 0828 (!) 117/55 97.8 °F (36.6 °C) Temporal 90 22 100 % --   05/12/22 0816 115/64 97.3 °F (36.3 °C) Temporal 90 19 100 % --   05/12/22 0800 -- -- -- 89 -- -- --   05/12/22 0700 (!) 102/51 -- -- 86 21 100 % --   05/12/22 0600 (!) 95/48 -- -- 83 24 100 % --   05/12/22 0516 (!) 97/50 -- -- -- -- -- --   05/12/22 0500 (!) 89/41 -- -- 85 30 100 % --   05/12/22 0400 130/60 98.4 °F (36.9 °C) Temporal 88 21 100 % 256 lb 8 oz (116.3 kg)   05/12/22 0354 -- -- -- -- 19 100 % --   05/12/22 0300 (!) 168/111 -- -- 85 20 100 % --   05/12/22 0200 (!) 111/52 -- -- 83 21 100 % --   05/12/22 0130 117/61 -- -- 84 20 95 % --   05/12/22 0100 -- -- -- 87 21 100 % --   05/12/22 0041 -- -- -- -- 21 100 % --   05/12/22 0000 (!) 125/51 99.3 °F (37.4 °C) Temporal 84 21 100 % --   05/11/22 2300 (!) 120/50 -- -- 80 20 100 % --   05/11/22 2206 -- -- -- 94 19 -- --   05/11/22 2200 132/61 -- -- 94 24 (!) 85 % --   05/11/22 2113 -- -- -- -- 18 100 % --   05/11/22 2100 -- -- -- 94 22 -- --   05/11/22 2000 (!) 115/95 98.8 °F (37.1 °C) Axillary 95 17 100 % --   05/11/22 1915 (!) 135/53 -- -- 93 21 100 % --   05/11/22 1838 (!) 134/52 -- -- 92 21 100 % --   05/11/22 1600 -- -- -- 96 -- -- --   05/11/22 1500 (!) 140/105 -- -- 95 20 98 % --   05/11/22 1445 137/63 -- -- 95 20 100 % --   22 1430 (!) 129/58 -- -- 90 22 99 % --   22 1415 109/73 -- -- 84 20 100 % --   22 1400 (!) 139/54 -- -- 84 15 100 % --   22 1345 (!) 110/50 -- -- 84 17 -- --   22 1330 (!) 124/50 -- -- 85 19 -- --   22 1315 (!) 115/49 -- -- 84 17 -- --      TEMPERATURE HISTORY 24H: Temp (24hrs), Av °F (36.7 °C), Min:97.3 °F (36.3 °C), Max:99.3 °F (37.4 °C)    BLOOD PRESSURE HISTORY: Systolic (52CMB), ICM:609 , Min:89 , ONJ:833    Diastolic (31QMC), XOL:01, Min:41, Max:111      Intake/Output:    I/O last 3 completed shifts: In: 1463.7 [I.V.:636; IV Piggyback:396.4]  Out: 1700 [Emesis/NG output:1700]  I/O this shift: In: 406 [Blood:406]  Out: -   Drain/tube Output:           Physical Exam:  General: awake, trach colar  Pulmonary: unlabored on ventilator  Abdomen: soft, mild distension, appropriate incisional tenderness only, bowel sounds hypoactive  Skin/Wound: PREM dressing in place, seal intact, pump functioning     Labs:  CBC:    Recent Labs     05/10/22  0522 05/11/22  0505 22  0337   WBC 7.1 7.5 6.2   HGB 7.9* 7.5* 6.8*   HCT 25.4* 24.6* 22.0*    411 313     BMP:    Recent Labs     05/10/22  0522  0505 22  0337    141 137   K 4.8 3.8 3.6   CL 94* 96* 94*   CO2 26 25 26   BUN 74* 44* 52*   CREATININE 7.4* 5.0* 6.5*   GLUCOSE 102* 110* 183*     Hepatic:   Recent Labs     22   AST <5*   ALT <5*   BILITOT 0.6   ALKPHOS 68     Amylase: No results for input(s): AMYLASE in the last 72 hours. Lipase: No results for input(s): LIPASE in the last 72 hours. Mag:      Recent Labs     22  0505 22   MG 2.00 1.80      Phos:     Recent Labs     22  0505 22   PHOS 5.4* 4.5      Coags: No results for input(s): INR, APTT in the last 72 hours.     Cultures:  Relevant cultures documented under results section     Pathology:   No relevant pathology     Imaging:  I have personally reviewed the following films:    XR CHEST PORTABLE    Result Date: 5/10/2022  EXAMINATION: ONE XRAY VIEW OF THE CHEST 5/10/2022 7:16 pm COMPARISON: None. HISTORY: ORDERING SYSTEM PROVIDED HISTORY: central line placement TECHNOLOGIST PROVIDED HISTORY: Reason for exam:->central line placement Reason for Exam: central line placement FINDINGS: Tracheostomy in place. Esophagogastric tube coursing below the diaphragm and elevated view. Left internal jugular central venous catheter terminates within the mid SVC. No postprocedure pneumothorax     Left internal jugular central venous catheter terminates within the mid SVC. No postprocedure pneumothorax     FL SMALL BOWEL FOLLOW THROUGH ONLY    Result Date: 5/10/2022  EXAMINATION: SMALL BOWEL FOLLOW THROUGH SERIES 5/9/2022 TECHNIQUE: Small bowel follow through series was performed with overhead images and spot images. FLUOROSCOPY DOSE AND TYPE OR TIME AND EXPOSURES: 0 COMPARISON: 05/07/2022 HISTORY: ORDERING SYSTEM PROVIDED HISTORY: water soluble contrast through NG, eval SBO TECHNOLOGIST PROVIDED HISTORY: Reason for exam:->water soluble contrast through NG, eval SBO Reason for Exam: eval SBO FINDINGS: There worsening dilated loops of small bowel throughout the abdomen likely due to a partial small bowel obstruction. The nasogastric tube terminates in gastric fundus. Degenerative changes involve the lumbar spine and bilateral hips. The bones are demineralized. The contrast never reaches the cecum even after 18 hours. 1. Worsening partial small bowel obstruction.      Scheduled Meds:   insulin lispro  0-12 Units SubCUTAneous Q4H    insulin glargine  5 Units SubCUTAneous Nightly    [START ON 5/19/2022] fat emulsion  250 mL IntraVENous Once per day on Mon Thu    epoetin claire-epbx  10,000 Units IntraVENous Once per day on Tue Thu Sat    famotidine (PEPCID) injection  20 mg IntraVENous Daily    metoprolol tartrate  25 mg Oral BID    QUEtiapine  12.5 mg Oral Nightly    pantoprazole  20 mg IntraVENous Daily    sodium chloride flush  5-40 mL IntraVENous 2 times per day    sodium chloride flush  5-40 mL IntraVENous 2 times per day    heparin (porcine)  5,000 Units SubCUTAneous TID    heparin (porcine)  1,000 Units IntraVENous Once     Continuous Infusions:   sodium chloride      PN-Adult Premix 5/20 - Standard Electrolytes - Central Line      PN-Adult Premix 5/20 - Standard Electrolytes - Central Line 40 mL/hr at 05/12/22 0605    sodium chloride      sodium chloride 5 mL/hr at 05/12/22 0605    dextrose       PRN Meds:.sodium chloride, morphine **OR** morphine, metoprolol, HYDROmorphone, LORazepam, sodium chloride, ondansetron **OR** ondansetron, acetaminophen **OR** acetaminophen, sodium chloride flush, sodium chloride, polyethylene glycol, glucose, glucagon (rDNA), dextrose, dextrose bolus **OR** dextrose bolus, diphenhydrAMINE      Assessment:  OR Date 5/10/2022, exploratory laparotomy with lysis of adhesions for small bowel obstruction  Fecal impaction  Chronic respiratory failure on trach/vent  Hypertension  Diabetes  End-stage renal disease on hemodialysis  Paroxysmal atrial fibrillation  History of DVT, on Coumadin  Vaginal bleeding  Morbid obesity, BMI 44.3        Plan:  1. Remains on trach collar, abdominal exam appropriate, PREM dressing intact--keep in place; continued supportive care  2. NPO with NGT decompression; monitor bowel function - may be ready to remove tomorrow  3. IV hydration and electrolyte correction per nephrology  4. Activity as tolerated  5. Ventilator management and weaning per pulmonology  6. PRN analgesics and antiemetics--minimizing narcotics as tolerated  7. DVT prophylaxis with heparin injections; hold oral anticoagulation  8. Management of medical comorbid etiologies per primary team and consulting services  9. Disposition: ICU    EDUCATION:  Educated patient on plan of care and disease process--all questions answered.     Plans discussed with patient and nursing.       Signed:      Yakelin Pearson MD

## 2022-05-12 NOTE — PROGRESS NOTES
Morning assessment completed (See Flow sheets for details). Pt alert and oriented X 2. Able to follow commands. Stays on trach collar with fio2 of 40%. Tolerating well. Ordered 1 pack of RBC currently transfusing. Pt NG stays on low continuous suction. Turned and repositioned pt. Call light on reach. Will continue to monitor.

## 2022-05-12 NOTE — PROGRESS NOTES
[address]  Patient has size 6* Shiley XLT . Trach Kit of same size on standby  Yes, Obturator at head of the bed  Yes. Inner cannula cleaned/replaced Yes, drain sponge changed  Yes, Trach tie replaced is soiled No, Flange cleaned  Yes. [address] performed without complications.

## 2022-05-12 NOTE — PROGRESS NOTES
Nephrology Progress Note  [facility]  [phone]   [facility]. [email or web address]    Patient:  Hank Wahl   : 1950    Reason for Consultation : ESRD on HD     Brief HPI   Mrs. Alyssa Parada is a 79 yo woman with h/o ESRD on HD TTS, chronic resp failure s/p trach, h/o CVA, morbid obesity, DM II, Pulm HTN, Afib on AC was admitted for abdominal pain, nausea and emesis.    She was sent from [facility] for sob and the above symptoms  Imaging on admission showed   partial small bowel obstruction, with a discrete transition   point noted adjacent to hernia mesh within the anterior lower abdomen     Subjective/Interval history  Pt seen and examined  Plans for dialysis today  Off vasopressors  Now on TPN  Has been afebrile  Off vent and on TM    Meds:  Scheduled Meds:   [START ON 2022] fat emulsion  250 mL IntraVENous Once per day on     epoetin claire-epbx  10,000 Units IntraVENous Once per day on  Sat    famotidine (PEPCID) injection  20 mg IntraVENous Daily    metoprolol tartrate  25 mg Oral BID    QUEtiapine  12.5 mg Oral Nightly    pantoprazole  20 mg IntraVENous Daily    sodium chloride flush  5-40 mL IntraVENous 2 times per day    sodium chloride flush  5-40 mL IntraVENous 2 times per day    heparin (porcine)  5,000 Units SubCUTAneous TID    insulin lispro  0-6 Units SubCUTAneous Q4H    heparin (porcine)  1,000 Units IntraVENous Once     Continuous Infusions:   sodium chloride      PN-Adult Premix 5/20 - Standard Electrolytes - Central Line      PN-Adult Premix 5/20 - Standard Electrolytes - Central Line 40 mL/hr at 22 0605    fentaNYL Stopped (22 1416)    propofol Stopped (22 1416)    phenylephrine (RUSS-SYNEPHRINE) 50mg/250mL infusion Stopped (22 1229)    sodium chloride      sodium chloride 5 mL/hr at 22 0605    dextrose       PRN Meds:.sodium chloride, morphine **OR** morphine, metoprolol, HYDROmorphone, LORazepam, sodium chloride, ondansetron **OR** ondansetron, acetaminophen **OR** acetaminophen, sodium chloride flush, sodium chloride, polyethylene glycol, glucose, glucagon (rDNA), dextrose, dextrose bolus **OR** dextrose bolus, diphenhydrAMINE    Vitals:  BP (!) 105/53   Pulse 90   Temp 97.3 °F (36.3 °C) (Temporal)   Resp 17   Ht 5' 4\" (1.626 m)   Wt 256 lb 8 oz (116.3 kg)   SpO2 100%   BMI 44.03 kg/m²     Physical Exam:  Gen: on TM, not in resp distress  HEENT: PERRL, NGT+  CV: irregularly irregular, S1 S2+  Lungs: decreased breath sounds at the bases  Abd: abdominal binder+  Ext: Trace bilateral LE edema, no cyanosis  Access: Left upper arm AV graft +bruit. Labs:  CBC:   Lab Results   Component Value Date    WBC 6.2 05/12/2022    RBC 2.29 05/12/2022    HGB 6.8 05/12/2022    HCT 22.0 05/12/2022    MCV 96.4 05/12/2022    MCH 29.6 05/12/2022    MCHC 30.7 05/12/2022    RDW 18.5 05/12/2022     05/12/2022    MPV 7.2 05/12/2022     BMP:    Lab Results   Component Value Date     05/12/2022    K 3.6 05/12/2022    K 4.7 05/06/2022    CL 94 05/12/2022    CO2 26 05/12/2022    BUN 52 05/12/2022    LABALBU 3.3 05/12/2022    CREATININE 6.5 05/12/2022    CALCIUM 8.8 05/12/2022    GFRAA 8 05/12/2022    LABGLOM 6 05/12/2022    GLUCOSE 183 05/12/2022       Assessment/Plan:    ESRD   on HD TTS at [facility]  -last outpatient HD on 5/3  -missed 5/5  -Got HD 5/6 x 2 hours  - HD back to Tues/Thurs/Sat schedule.   She cut her treatment off after 50 minutes Saturday and declines dialysis today  HD today as per schedule with no fluid removal    Hyperkalemia mild, improved     Small bowel obstruction high grade  S/p ex-lap, VIC, hernia reduced 5/10     Vaginal bleeding  Possibly from fibroid uterus     Anemia in CKD  Hb below goal  Continue ESAs with HD    Chronic resp failure: s/p trach  Now on vent post-op    Renal cysts bilateral  Left renal lesion  Needs further imaging to characterize    FEN  On TPN    Cecilia Cruz MD  [facility]  Office Number: [phone]

## 2022-05-13 LAB
ANION GAP SERPL CALCULATED.3IONS-SCNC: 17 MMOL/L (ref 3–16)
BLOOD BANK DISPENSE STATUS: NORMAL
BLOOD BANK PRODUCT CODE: NORMAL
BPU ID: NORMAL
BUN BLDV-MCNC: 57 MG/DL (ref 7–20)
CALCIUM SERPL-MCNC: 9 MG/DL (ref 8.3–10.6)
CHLORIDE BLD-SCNC: 98 MMOL/L (ref 99–110)
CO2: 25 MMOL/L (ref 21–32)
CREAT SERPL-MCNC: 6 MG/DL (ref 0.6–1.2)
DESCRIPTION BLOOD BANK: NORMAL
GFR AFRICAN AMERICAN: 8
GFR NON-AFRICAN AMERICAN: 7
GLUCOSE BLD-MCNC: 195 MG/DL (ref 70–99)
GLUCOSE BLD-MCNC: 202 MG/DL (ref 70–99)
GLUCOSE BLD-MCNC: 206 MG/DL (ref 70–99)
GLUCOSE BLD-MCNC: 225 MG/DL (ref 70–99)
GLUCOSE BLD-MCNC: 232 MG/DL (ref 70–99)
HCT VFR BLD CALC: 23.1 % (ref 36–48)
HCT VFR BLD CALC: 24.4 % (ref 36–48)
HEMOGLOBIN: 6.9 G/DL (ref 12–16)
HEMOGLOBIN: 7.8 G/DL (ref 12–16)
MAGNESIUM: 1.8 MG/DL (ref 1.8–2.4)
MCH RBC QN AUTO: 28.7 PG (ref 26–34)
MCHC RBC AUTO-ENTMCNC: 29.9 G/DL (ref 31–36)
MCV RBC AUTO: 95.8 FL (ref 80–100)
PDW BLD-RTO: 19.4 % (ref 12.4–15.4)
PERFORMED ON: ABNORMAL
PHOSPHORUS: 3.7 MG/DL (ref 2.5–4.9)
PLATELET # BLD: 288 K/UL (ref 135–450)
PMV BLD AUTO: 7.5 FL (ref 5–10.5)
POTASSIUM SERPL-SCNC: 3.5 MMOL/L (ref 3.5–5.1)
RBC # BLD: 2.41 M/UL (ref 4–5.2)
SODIUM BLD-SCNC: 140 MMOL/L (ref 136–145)
WBC # BLD: 7.8 K/UL (ref 4–11)

## 2022-05-13 PROCEDURE — 2700000000 HC OXYGEN THERAPY PER DAY

## 2022-05-13 PROCEDURE — 2500000003 HC RX 250 WO HCPCS: Performed by: INTERNAL MEDICINE

## 2022-05-13 PROCEDURE — 84100 ASSAY OF PHOSPHORUS: CPT

## 2022-05-13 PROCEDURE — 6360000002 HC RX W HCPCS: Performed by: SURGERY

## 2022-05-13 PROCEDURE — 6370000000 HC RX 637 (ALT 250 FOR IP): Performed by: SURGERY

## 2022-05-13 PROCEDURE — 85027 COMPLETE CBC AUTOMATED: CPT

## 2022-05-13 PROCEDURE — 2060000000 HC ICU INTERMEDIATE R&B

## 2022-05-13 PROCEDURE — 36430 TRANSFUSION BLD/BLD COMPNT: CPT

## 2022-05-13 PROCEDURE — 2580000003 HC RX 258: Performed by: SURGERY

## 2022-05-13 PROCEDURE — 83735 ASSAY OF MAGNESIUM: CPT

## 2022-05-13 PROCEDURE — 6370000000 HC RX 637 (ALT 250 FOR IP): Performed by: INTERNAL MEDICINE

## 2022-05-13 PROCEDURE — 80048 BASIC METABOLIC PNL TOTAL CA: CPT

## 2022-05-13 PROCEDURE — 6370000000 HC RX 637 (ALT 250 FOR IP): Performed by: NURSE PRACTITIONER

## 2022-05-13 PROCEDURE — 85014 HEMATOCRIT: CPT

## 2022-05-13 PROCEDURE — 99024 POSTOP FOLLOW-UP VISIT: CPT | Performed by: SURGERY

## 2022-05-13 PROCEDURE — 6360000002 HC RX W HCPCS: Performed by: INTERNAL MEDICINE

## 2022-05-13 PROCEDURE — C9113 INJ PANTOPRAZOLE SODIUM, VIA: HCPCS | Performed by: SURGERY

## 2022-05-13 PROCEDURE — 36592 COLLECT BLOOD FROM PICC: CPT

## 2022-05-13 PROCEDURE — 94761 N-INVAS EAR/PLS OXIMETRY MLT: CPT

## 2022-05-13 PROCEDURE — 85018 HEMOGLOBIN: CPT

## 2022-05-13 RX ORDER — BISACODYL 10 MG
10 SUPPOSITORY, RECTAL RECTAL ONCE
Status: COMPLETED | OUTPATIENT
Start: 2022-05-13 | End: 2022-05-13

## 2022-05-13 RX ORDER — SODIUM CHLORIDE 9 MG/ML
INJECTION, SOLUTION INTRAVENOUS PRN
Status: DISCONTINUED | OUTPATIENT
Start: 2022-05-13 | End: 2022-05-18

## 2022-05-13 RX ORDER — INSULIN GLARGINE 100 [IU]/ML
15 INJECTION, SOLUTION SUBCUTANEOUS NIGHTLY
Status: DISCONTINUED | OUTPATIENT
Start: 2022-05-13 | End: 2022-05-14

## 2022-05-13 RX ORDER — POTASSIUM CHLORIDE 29.8 MG/ML
20 INJECTION INTRAVENOUS
Status: COMPLETED | OUTPATIENT
Start: 2022-05-13 | End: 2022-05-13

## 2022-05-13 RX ADMIN — MORPHINE SULFATE 2 MG: 2 INJECTION, SOLUTION INTRAMUSCULAR; INTRAVENOUS at 20:09

## 2022-05-13 RX ADMIN — HEPARIN SODIUM 5000 UNITS: 5000 INJECTION INTRAVENOUS; SUBCUTANEOUS at 16:00

## 2022-05-13 RX ADMIN — DIPHENHYDRAMINE HYDROCHLORIDE 25 MG: 50 INJECTION, SOLUTION INTRAMUSCULAR; INTRAVENOUS at 07:28

## 2022-05-13 RX ADMIN — POTASSIUM CHLORIDE 20 MEQ: 29.8 INJECTION INTRAVENOUS at 14:53

## 2022-05-13 RX ADMIN — SODIUM CHLORIDE, PRESERVATIVE FREE 10 ML: 5 INJECTION INTRAVENOUS at 21:49

## 2022-05-13 RX ADMIN — PANTOPRAZOLE SODIUM 20 MG: 40 INJECTION, POWDER, FOR SOLUTION INTRAVENOUS at 10:53

## 2022-05-13 RX ADMIN — Medication 10 ML: at 11:00

## 2022-05-13 RX ADMIN — HEPARIN SODIUM 5000 UNITS: 5000 INJECTION INTRAVENOUS; SUBCUTANEOUS at 10:55

## 2022-05-13 RX ADMIN — Medication 10 ML: at 20:12

## 2022-05-13 RX ADMIN — METOPROLOL TARTRATE 25 MG: 25 TABLET, FILM COATED ORAL at 21:15

## 2022-05-13 RX ADMIN — HYDROMORPHONE HYDROCHLORIDE 0.5 MG: 1 INJECTION, SOLUTION INTRAMUSCULAR; INTRAVENOUS; SUBCUTANEOUS at 05:41

## 2022-05-13 RX ADMIN — Medication 10 MG: at 17:50

## 2022-05-13 RX ADMIN — SODIUM CHLORIDE 25 ML: 9 INJECTION, SOLUTION INTRAVENOUS at 13:50

## 2022-05-13 RX ADMIN — INSULIN LISPRO 4 UNITS: 100 INJECTION, SOLUTION INTRAVENOUS; SUBCUTANEOUS at 21:09

## 2022-05-13 RX ADMIN — POTASSIUM CHLORIDE 20 MEQ: 29.8 INJECTION INTRAVENOUS at 13:51

## 2022-05-13 RX ADMIN — QUETIAPINE FUMARATE 12.5 MG: 25 TABLET ORAL at 21:15

## 2022-05-13 RX ADMIN — HEPARIN SODIUM 5000 UNITS: 5000 INJECTION INTRAVENOUS; SUBCUTANEOUS at 20:12

## 2022-05-13 RX ADMIN — DIPHENHYDRAMINE HYDROCHLORIDE 25 MG: 50 INJECTION, SOLUTION INTRAMUSCULAR; INTRAVENOUS at 20:27

## 2022-05-13 RX ADMIN — SODIUM CHLORIDE, PRESERVATIVE FREE 10 ML: 5 INJECTION INTRAVENOUS at 20:27

## 2022-05-13 RX ADMIN — INSULIN LISPRO 2 UNITS: 100 INJECTION, SOLUTION INTRAVENOUS; SUBCUTANEOUS at 17:48

## 2022-05-13 RX ADMIN — MORPHINE SULFATE 2 MG: 2 INJECTION, SOLUTION INTRAMUSCULAR; INTRAVENOUS at 14:47

## 2022-05-13 RX ADMIN — Medication 10 ML: at 20:10

## 2022-05-13 RX ADMIN — INSULIN GLARGINE 15 UNITS: 100 INJECTION, SOLUTION SUBCUTANEOUS at 21:10

## 2022-05-13 RX ADMIN — MORPHINE SULFATE 4 MG: 4 INJECTION INTRAVENOUS at 10:59

## 2022-05-13 RX ADMIN — INSULIN LISPRO 4 UNITS: 100 INJECTION, SOLUTION INTRAVENOUS; SUBCUTANEOUS at 11:44

## 2022-05-13 RX ADMIN — LORAZEPAM 0.5 MG: 2 INJECTION INTRAMUSCULAR; INTRAVENOUS at 21:49

## 2022-05-13 RX ADMIN — ASCORBIC ACID, VITAMIN A PALMITATE, CHOLECALCIFEROL, THIAMINE HYDROCHLORIDE, RIBOFLAVIN-5 PHOSPHATE SODIUM, PYRIDOXINE HYDROCHLORIDE, NIACINAMIDE, DEXPANTHENOL, ALPHA-TOCOPHEROL ACETATE, VITAMIN K1, FOLIC ACID, BIOTIN, CYANOCOBALAMIN: 200; 3300; 200; 6; 3.6; 6; 40; 15; 10; 150; 600; 60; 5 INJECTION, SOLUTION INTRAVENOUS at 18:27

## 2022-05-13 ASSESSMENT — PAIN DESCRIPTION - ORIENTATION: ORIENTATION: RIGHT

## 2022-05-13 ASSESSMENT — PAIN SCALES - GENERAL
PAINLEVEL_OUTOF10: 0
PAINLEVEL_OUTOF10: 10
PAINLEVEL_OUTOF10: 4
PAINLEVEL_OUTOF10: 10
PAINLEVEL_OUTOF10: 0
PAINLEVEL_OUTOF10: 6
PAINLEVEL_OUTOF10: 0

## 2022-05-13 ASSESSMENT — PAIN DESCRIPTION - LOCATION
LOCATION: ABDOMEN
LOCATION: ABDOMEN

## 2022-05-13 ASSESSMENT — PAIN - FUNCTIONAL ASSESSMENT: PAIN_FUNCTIONAL_ASSESSMENT: ACTIVITIES ARE NOT PREVENTED

## 2022-05-13 ASSESSMENT — PAIN DESCRIPTION - DESCRIPTORS: DESCRIPTORS: ACHING

## 2022-05-13 ASSESSMENT — PAIN SCALES - WONG BAKER: WONGBAKER_NUMERICALRESPONSE: 0

## 2022-05-13 NOTE — PROGRESS NOTES
[address]  Patient has size # 6 XLT or other. Trach Kit of same size on standby  Yes, Obturator at head of the bed  Yes. Inner cannula cleaned/replaced Yes, drain sponge changed  Yes, Trach tie replaced is soiled No, Flange cleaned  Yes. [address] performed without complications.  Comment: very little drainage around the trach site

## 2022-05-13 NOTE — PROGRESS NOTES
Treatment time: 50 minutes    Net UF: 0 mL    Pre weight: 116.3 kg  Post weight: 116.3 kg  EDW: 129 kg    Access used: AVF RUE  Access function: good    Medications or blood products given: none    Regular outpatient schedule: TTS    Summary of response to treatment: Patient tolerated treatment poorly, patient notified writer after 20 minutes that she wanted to terminate treatment, patient was A&O x3 and answering questions appropriately, patient was agreeable to stay on treatment for 1 hour, 30 minutes later patient requested to end treatment again and requested writer at bedside, writer reminded patient that she had agreed to stay on for 1 hour, at this time patient stated she wanted off dialysis \"now\". Patient then dislodged her arterial needle and demanded to be taken off dialysis machine, treatment terminated at this time, blood returned to venous needle, report given to Casper Cardenas [facility]. Copy of dialysis treatment record placed in chart, to be scanned into EMR.

## 2022-05-13 NOTE — PROGRESS NOTES
Nutrition Note    RECOMMENDATIONS  1. PO Diet: NPO; exceptions: ice chips, sips of water, clear liquids, popsicles  2. Nutrition Support:   Continue Clinimix 5/20 at 83 mL/hr. Recommend 250 mL of 20% lipids starting on 5/16/2022. NUTRITION ASSESSMENT   Nutrition status stable AEB pt has been receiving parenteral nutrition over past two days. Now at goal of 83 mL/hr and electrolytes WNL. Continues with NG suction. Will follow for nayeli to PO diet as advanced and adequacy of intake.  Nutrition Related Findings: 5/12 ; ESRD: HD; NG suction; LBM 5/9; abdomen distended, rounded, rotund, hypoactive BS;    Wounds: Surgical Incision   Nutrition Education:  Education not indicated    Nutrition Goals: Tolerate nutrition support at goal rate     MALNUTRITION ASSESSMENT   Acute Illness  Malnutrition Status: At risk for malnutrition (Comment)      NUTRITION DIAGNOSIS   · Inadequate oral intake related to altered GI function as evidenced by NPO or clear liquid status due to medical condition        CURRENT NUTRITION THERAPIES  Diet NPO Exceptions are: Ice Chips, Sips of Water with Meds, Sips of Clear Liquids, Popsicles  PN-Adult Premix 5/20 - Standard Electrolytes - Central Line       Current Parenteral Nutrition Orders:  · Type and Formula: Premix Central   · Lipids: None  · Duration: Continuous  · Current PN Order Provides: Clinimix 5/20 at 83 mL/hr provides 1992 mL total volume, 1755 kcal, 100g protein, dex load 2.3 mg/kg/min  · Goal PN Orders Provides: as above      ANTHROPOMETRICS   Current Height: 5' 4\" (162.6 cm)   Current Weight: 266 lb 9.6 oz (120.9 kg)     Admission weight: 298 lb (135.2 kg)   Ideal Body Weight (IBW): 120 lbs  (55 kg)        BMI: 45.6      COMPARATIVE STANDARDS  Energy (kcal):  968 - 1815     Protein (g):  99 - 109       Fluid (mL/day):  1815    The patient will be monitored per nutrition standards of care.  Consult dietitian if additional nutrition interventions are needed prior to RD reassessment.      Elena Biswas RD, LD    Contact: [phone]

## 2022-05-13 NOTE — PROGRESS NOTES
Clinical Pharmacy Note    Pharmacy consulted by Dr. Shelley Valdovinos to manage TPN    Current TPN rate: 83 ml/hr  Goal TPN rate: 83 ml/hr    Labs:  General Labs:  BMP:    Lab Results   Component Value Date     05/13/2022    K 3.5 05/13/2022    K 4.7 05/06/2022    CL 98 05/13/2022    CO2 25 05/13/2022    BUN 57 05/13/2022    LABALBU 3.3 05/12/2022    CREATININE 6.0 05/13/2022    CALCIUM 9.0 05/13/2022    GFRAA 8 05/13/2022    LABGLOM 7 05/13/2022    GLUCOSE 206 05/13/2022     Magnesium:    Lab Results   Component Value Date    MG 1.80 05/13/2022     Phosphorus:    Lab Results   Component Value Date    PHOS 3.7 05/13/2022     Blood sugars: 174-225    Electrolyte replacement as follows:   Replace potassium with 40 mEq of potassium chloride administered IV over 2hours    Blood sugar management:  Continue medium dose sliding scale. Increase Lantus to 15 units nightly. Plan to continue TPN to 83 ml/hr. Thank you for allowing pharmacy to participate in the care of this patient.     Tramaine Hernandez, [facility], PharmD 5/13/2022

## 2022-05-13 NOTE — PROGRESS NOTES
Nephrology Progress Note  [facility]  [phone]   [facility]. com    Patient:  Nehemias Otero   : 1950    Reason for Consultation : ESRD on HD     Brief HPI   Mrs. Nadir Oakes is a 79 yo woman with h/o ESRD on HD TTS, chronic resp failure s/p trach, h/o CVA, morbid obesity, DM II, Pulm HTN, Afib on AC was admitted for abdominal pain, nausea and emesis.    She was sent from [facility] for sob and the above symptoms  Imaging on admission showed   partial small bowel obstruction, with a discrete transition   point noted adjacent to hernia mesh within the anterior lower abdomen     Subjective/Interval history  Pt seen and examined  Had dialysis yesterday  Not interactive at this time, appears sleepy  BPs controlled  Has been afebrile  Remains on TPN    Meds:  Scheduled Meds:   insulin glargine  15 Units SubCUTAneous Nightly    potassium chloride  20 mEq IntraVENous Q1H    insulin lispro  0-12 Units SubCUTAneous Q4H    [START ON 2022] fat emulsion  250 mL IntraVENous Once per day on     epoetin claire-epbx  10,000 Units IntraVENous Once per day on  Sat    metoprolol tartrate  25 mg Oral BID    QUEtiapine  12.5 mg Oral Nightly    pantoprazole  20 mg IntraVENous Daily    sodium chloride flush  5-40 mL IntraVENous 2 times per day    sodium chloride flush  5-40 mL IntraVENous 2 times per day    heparin (porcine)  5,000 Units SubCUTAneous TID    heparin (porcine)  1,000 Units IntraVENous Once     Continuous Infusions:   PN-Adult Premix 5/20 - Standard Electrolytes - Central Line      sodium chloride      sodium chloride      PN-Adult Premix 5/20 - Standard Electrolytes - Central Line 83 mL/hr at 22 2057    sodium chloride      sodium chloride 5 mL/hr at 22 0605    dextrose       PRN Meds:.sodium chloride, sodium chloride, morphine **OR** morphine, metoprolol, HYDROmorphone, LORazepam, sodium chloride, ondansetron **OR** ondansetron, acetaminophen **OR** acetaminophen, sodium chloride flush, sodium chloride, polyethylene glycol, glucose, glucagon (rDNA), dextrose, dextrose bolus **OR** dextrose bolus, diphenhydrAMINE    Vitals:  /74   Pulse 89   Temp 98 °F (36.7 °C) (Axillary)   Resp 16   Ht 5' 4\" (1.626 m)   Wt 266 lb 9.6 oz (120.9 kg)   SpO2 99%   BMI 45.76 kg/m²     Physical Exam:  Gen: on TM, not in resp distress, resting in bed  HEENT: PERRL, no palor, NGT+  CV: irregularly irregular, S1 S2+  Lungs: decreased breath sounds at the bases  Abd: abdominal binder+  Ext: Trace bilateral LE edema, no cyanosis  Access: Left upper arm AV graft +bruit. Labs:  CBC:   Lab Results   Component Value Date    WBC 7.8 05/13/2022    RBC 2.41 05/13/2022    HGB 6.9 05/13/2022    HCT 23.1 05/13/2022    MCV 95.8 05/13/2022    MCH 28.7 05/13/2022    MCHC 29.9 05/13/2022    RDW 19.4 05/13/2022     05/13/2022    MPV 7.5 05/13/2022     BMP:    Lab Results   Component Value Date     05/13/2022    K 3.5 05/13/2022    K 4.7 05/06/2022    CL 98 05/13/2022    CO2 25 05/13/2022    BUN 57 05/13/2022    LABALBU 3.3 05/12/2022    CREATININE 6.0 05/13/2022    CALCIUM 9.0 05/13/2022    GFRAA 8 05/13/2022    LABGLOM 7 05/13/2022    GLUCOSE 206 05/13/2022       Assessment/Plan:    ESRD   on HD TTS at [facility]  -last outpatient HD on 5/3  -missed 5/5  -Got HD 5/6 x 2 hours  - HD back to Tues/Thurs/Sat schedule.   She cut her treatment off after 50 minutes Saturday and declines dialysis today  Next HD in AM    Hyperkalemia mild, improved  Now with low normal K, being repleted cautiously     Small bowel obstruction high grade  S/p ex-lap, VIC, hernia reduced 5/10     Vaginal bleeding  Possibly from fibroid uterus     Anemia in CKD  Hb below goal  Continue ESAs with HD    Chronic resp failure: s/p trach  Now on vent post-op    Renal cysts bilateral  Left renal lesion  Needs further imaging to characterize    FEN  On TPN    Jimy Coy MD  [facility]  Office Number: [phone]  [facility]. [facility]

## 2022-05-13 NOTE — PROGRESS NOTES
HEMODIALYSIS PRE-TREATMENT NOTE    Patient Identifiers prior to treatment: Name,     Isolation Required:  Yes                      Isolation Type: Contact/MDRO       (please document if patient is being managed as a PUI/COVID-19 patient)        Hepatitis status:                           Date Drawn                             Result  Hepatitis B Surface Antigen 22     neg                     Hepatitis B Surface Antibody 21 Immune        Hepatitis B Core Antibody n/a n/a          How was Hepatitis Status verified: Epic     Was a copy of the labs you documented provided to facility for the patient's chart: Epic    Hemodialysis orders verified: Yes    Access Within normal limits ( I.e. s/s of infection,...): WNL, richard, alia present     Pre-Assessment completed: Yes    Pre-dialysis report received from: Christina Ross RN                      Time: 21:00

## 2022-05-13 NOTE — PROGRESS NOTES
[facility]        Progress Note    Pt Name: Hank Wahl  MRN: [id number]  [place]: 1950  Date of evaluation: 2022    Subjective:    No acute events overnight, awake and talking  Remains off ventilator  Pain controlled  No nausea or vomiting, NGT in place  No reports of flatus or stool  Resting in bed at this time    Postoperative Day #3      Vital Signs:  Patient Vitals for the past 24 hrs:   BP Temp Temp src Pulse Resp SpO2 Height Weight   22 1430 125/68 98 °F (36.7 °C) Axillary 89 16 100 % -- --   22 1355 108/69 98.1 °F (36.7 °C) Axillary 88 16 100 % -- --   22 1145 121/74 98 °F (36.7 °C) Axillary 89 16 99 % -- --   22 0948 -- -- -- -- -- -- 5' 4\" (1.626 m) --   22 0715 (!) 106/55 98.1 °F (36.7 °C) Oral 85 18 100 % -- --   22 0441 113/69 96.6 °F (35.9 °C) Axillary 83 18 100 % -- 266 lb 9.6 oz (120.9 kg)   22 0305 -- -- -- -- 18 100 % -- --   22 2347 -- -- -- -- 18 100 % -- --   22 2304 (!) 140/60 -- -- 106 18 -- -- --   22 2200 (!) 148/59 -- -- 99 -- -- -- --   22 2120 (!) 142/61 97.3 °F (36.3 °C) -- 97 18 -- -- --   22 -- -- -- -- 18 100 % -- --   22 1845 124/68 98.6 °F (37 °C) Oral 97 18 100 % -- --   22 1600 (!) 111/57 -- -- 88 17 100 % -- --      TEMPERATURE HISTORY 24H: Temp (24hrs), Av.8 °F (36.6 °C), Min:96.6 °F (35.9 °C), Max:98.6 °F (37 °C)    BLOOD PRESSURE HISTORY: Systolic (21FWG), NLO:340 , Min:89 , BQM:369    Diastolic (57AGH), XO, Min:41, Max:74      Intake/Output:    I/O last 3 completed shifts:   In: 914.5 [I.V.:77.2; Blood:406]  Out: 500 [Emesis/NG output:500]  I/O this shift:  In: 2173.9 [P.O.:360; I.V.:60.2; IV Piggyback:55.8]  Out: 2000 [Emesis/NG output:2000]  Drain/tube Output:         Physical Exam:  General: awake, alert, trach collar, interacts appropriately  Pulmonary: unlabored respirations  Abdomen: soft, mild distension, appropriate incisional tenderness only, bowel sounds hypoactive  Skin/Wound: PREM dressing in place, seal intact, pump functioning    Labs:  CBC:    Recent Labs     05/11/22  0505 05/11/22  0505 05/12/22  0337 05/12/22  1515 05/13/22  0530   WBC 7.5  --  6.2  --  7.8   HGB 7.5*   < > 6.8* 7.3* 6.9*   HCT 24.6*   < > 22.0* 23.8* 23.1*     --  313  --  288    < > = values in this interval not displayed. BMP:    Recent Labs     05/11/22  0505 05/12/22  0337 05/13/22  0530    137 140   K 3.8 3.6 3.5   CL 96* 94* 98*   CO2 25 26 25   BUN 44* 52* 57*   CREATININE 5.0* 6.5* 6.0*   GLUCOSE 110* 183* 206*     Hepatic:   Recent Labs     05/12/22 0337   AST <5*   ALT <5*   BILITOT 0.6   ALKPHOS 68     Amylase: No results for input(s): AMYLASE in the last 72 hours. Lipase: No results for input(s): LIPASE in the last 72 hours. Mag:      Recent Labs     05/11/22  0505 05/12/22  0337 05/13/22  0530   MG 2.00 1.80 1.80      Phos:     Recent Labs     05/11/22  0505 05/12/22  0337 05/13/22  0530   PHOS 5.4* 4.5 3.7      Coags: No results for input(s): INR, APTT in the last 72 hours. Cultures:  Relevant cultures documented under results section     Pathology:  No relevant pathology    Imaging:  I have personally reviewed the following films:    No results found.     Scheduled Meds:   insulin glargine  15 Units SubCUTAneous Nightly    bisacodyl  10 mg Rectal Once    insulin lispro  0-12 Units SubCUTAneous Q4H    [START ON 5/19/2022] fat emulsion  250 mL IntraVENous Once per day on Mon Thu    epoetin claire-epbx  10,000 Units IntraVENous Once per day on Tue Thu Sat    metoprolol tartrate  25 mg Oral BID    QUEtiapine  12.5 mg Oral Nightly    pantoprazole  20 mg IntraVENous Daily    sodium chloride flush  5-40 mL IntraVENous 2 times per day    sodium chloride flush  5-40 mL IntraVENous 2 times per day    heparin (porcine)  5,000 Units SubCUTAneous TID    heparin (porcine)  1,000 Units IntraVENous Once Continuous Infusions:   PN-Adult Premix 5/20 - Standard Electrolytes - Central Line      sodium chloride      sodium chloride      PN-Adult Premix 5/20 - Standard Electrolytes - Central Line 83 mL/hr at 05/13/22 1500    sodium chloride Stopped (05/13/22 1453)    sodium chloride Stopped (05/12/22 1746)    dextrose       PRN Meds:.sodium chloride, albuterol, sodium chloride, morphine **OR** morphine, metoprolol, HYDROmorphone, LORazepam, sodium chloride, ondansetron **OR** ondansetron, acetaminophen **OR** acetaminophen, sodium chloride flush, sodium chloride, polyethylene glycol, glucose, glucagon (rDNA), dextrose, dextrose bolus **OR** dextrose bolus, diphenhydrAMINE      Assessment:  OR Date 5/10/2022, exploratory laparotomy with lysis of adhesions for small bowel obstruction  Fecal impaction  Chronic respiratory failure on trach/vent  Hypertension  Diabetes  End-stage renal disease on hemodialysis  Paroxysmal atrial fibrillation  History of DVT, on Coumadin  Vaginal bleeding  Morbid obesity, BMI 44.3        Plan:  1. Abdominal exam appropriate, no bowel function, NGT output remains high, PREM dressing intact--keep in place; continued supportive care  2. NPO with NGT decompression; monitor bowel function--give Dulcolax suppository  3. IV hydration and electrolyte correction per nephrology  4. Activity as tolerated  5. PRN analgesics and antiemetics--minimizing narcotics as tolerated  6. DVT prophylaxis with heparin injections; hold oral anticoagulation  7. Management of medical comorbid etiologies per primary team and consulting services    EDUCATION:  Educated patient on plan of care and disease process--all questions answered. Plans discussed with patient and nursing. Reviewed and discussed with Dr. Josh Olson.       Signed:  ITALO Hernandez - CNP  5/13/2022 3:18 PM

## 2022-05-13 NOTE — PROGRESS NOTES
[address] [place] [address] PROGRESS NOTE    5/13/2022 7:21 AM        Name: Ashley Edgar . Admitted: 5/6/2022  Primary Care Provider: Sav Cortez MD (Tel: [phone])      Subjective:  . HGB 6.9 today   1 units PRBC ordered this am (also received PRBC on 5/12)    Pt seen this am with nurse at bedside  She was repositioned in the bed.    Reports adequate pain control this am    Was liberated from the vent on POD # 1 ( 5/11/22)      POD # 3 from [address] with VIC for SBO     Reviewed interval ancillary notes    Current Medications  0.9 % sodium chloride infusion, PRN  PN-Adult Premix 5/20 - Standard Electrolytes - Central Line, Continuous TPN  insulin lispro (HUMALOG) injection vial 0-12 Units, Q4H  insulin glargine (LANTUS) injection vial 5 Units, Nightly  [START ON 5/19/2022] fat emulsion 20 % infusion 250 mL, Once per day on Mon Thu  epoetin claire-epbx (RETACRIT) injection 10,000 Units, Once per day on Tue Thu Sat  famotidine (PEPCID) injection 20 mg, Daily  morphine (PF) injection 2 mg, Q2H PRN   Or  morphine injection 4 mg, Q2H PRN  metoprolol tartrate (LOPRESSOR) tablet 25 mg, BID  QUEtiapine (SEROQUEL) tablet 12.5 mg, Nightly  pantoprazole (PROTONIX) injection 20 mg, Daily  metoprolol (LOPRESSOR) injection 5 mg, Q6H PRN  HYDROmorphone HCl PF (DILAUDID) injection 0.5 mg, Q3H PRN  LORazepam (ATIVAN) injection 0.5 mg, Q12H PRN  sodium chloride flush 0.9 % injection 5-40 mL, 2 times per day  0.9 % sodium chloride infusion, PRN  ondansetron (ZOFRAN-ODT) disintegrating tablet 4 mg, Q8H PRN   Or  ondansetron (ZOFRAN) injection 4 mg, Q6H PRN  acetaminophen (TYLENOL) tablet 650 mg, Q6H PRN   Or  acetaminophen (TYLENOL) suppository 650 mg, Q6H PRN  sodium chloride flush 0.9 % injection 5-40 mL, 2 times per day  sodium chloride flush 0.9 % injection 5-40 mL, PRN  0.9 % sodium chloride infusion, PRN  heparin (porcine) injection 5,000 Units, TID  polyethylene glycol (GLYCOLAX) packet 17 g, Daily PRN  glucose (GLUTOSE) 40 % oral gel 15 g, PRN  glucagon (rDNA) injection 1 mg, PRN  dextrose 5 % solution, PRN  dextrose bolus 10% 125 mL, PRN   Or  dextrose bolus 10% 250 mL, PRN  heparin (porcine) injection 1,000 Units, Once  diphenhydrAMINE (BENADRYL) injection 25 mg, Q6H PRN        Objective:  /69   Pulse 83   Temp 96.6 °F (35.9 °C) (Axillary)   Resp 18   Ht 5' 4\" (1.626 m)   Wt 266 lb 9.6 oz (120.9 kg)   SpO2 100%   BMI 45.76 kg/m²     Intake/Output Summary (Last 24 hours) at 5/13/2022 0721  Last data filed at 5/12/2022 1139  Gross per 24 hour   Intake 406 ml   Output --   Net 406 ml      Wt Readings from Last 3 Encounters:   05/13/22 266 lb 9.6 oz (120.9 kg)   05/01/22 (!) 309 lb 8 oz (140.4 kg)   04/26/22 291 lb (132 kg)       General appearance:  Appears chronically ill and obese. Awake, she is alert and pleasant this am   Eyes: Sclera clear. Pupils equal.  ENT: Moist oral mucosa. Trachea midline, no adenopathy. Cardiovascular: Regular rhythm, normal S1, S2. No murmur. No edema in lower extremities  Respiratory: Not using accessory muscles. trach collar in place with 8 liters of oxygen  , no ronchi or wheezing. Decreased in bases   GI: Abdomen softly distended and obese with absent bowel sounds. NG to suction with green drainage. abd dressing is clean dry intact. abd binder is in place    Musculoskeletal: No cyanosis in digits, neck supple  Neurology: moving arms and her head, sedation is being weaned   Psych: sedated   Skin: Warm, dry, poor  Turgor, scratch marks notes on chest and arms     Labs and Tests:  CBC:   Recent Labs     05/11/22  0505 05/11/22  0505 05/12/22  0337 05/12/22  1515 05/13/22  0530   WBC 7.5  --  6.2  --  7.8   HGB 7.5*   < > 6.8* 7.3* 6.9*     --  313  --  288    < > = values in this interval not displayed.      BMP:    Recent Labs     05/11/22  0505 05/12/22  0337 05/13/22  0530   NA 141 137 140   K 3.8 3.6 3.5   CL 96* 94* 98*   CO2 25 26 25   BUN 44* 52* 57*   CREATININE 5.0* 6.5* 6.0*   GLUCOSE 110* 183* 206*     Hepatic:   Recent Labs     05/12/22  0337   AST <5*   ALT <5*   BILITOT 0.6   ALKPHOS 68       1. Worsening partial small bowel obstruction. Problem List  Principal Problem:    SBO (small bowel obstruction) ([facility])  Active Problems:    Fecal impaction (HCC)    ESRD on dialysis ([facility] [address]    Chronic respiratory failure requiring continuous mechanical ventilation through tracheostomy ([facility] [address]    History of CVA (cerebrovascular accident)    Anemia, chronic disease    Chronic diastolic heart failure ([facility] [address]    Essential hypertension    GERD (gastroesophageal reflux disease)    Morbid obesity with BMI of 50.0-59.9, adult ([facility])    SILVER (obstructive sleep apnea)  Resolved Problems:    * No resolved hospital problems. *       Assessment & Plan:   1. SBO: POD # 3 from Exp Lap with VIC: GS following,  Pt is bed bound and has not been able to walk for several years. 2. PAF;  Now in SR,  On BB   3. ESRD:  On HD q [facility], Saturday,  Nephrology is following  4. Hx of respiratory failure:  S/p trach > 1 year ago. She has been extubated, currently on 8 liters   5. T2DM:  Currently controlled with AIC 5.4 continue with humalog correction as needed   6. Hx of DVT: coumadin on hold , will discuss with surgery when we can possibly added SC heparin  7. Malnutrition: TPN started 5/11  8. Bilateral renal cysts per CT scan:  Needs dedicated MRI of kidney per renal mass protocol to exclude neoplasm   9. Acute on chronic anemia:  on retacrit , Hgb < 7 , give  And additional 1 unit PRBC   10.  Anticipate eventual d/c back to [facility] in [place] where she is a permanent resident       Diet: Diet NPO Exceptions are: Ice Chips, Sips of Water with Meds, Sips of Clear Liquids, Popsicles  PN-Adult Premix 5/20 - Standard Electrolytes - Central Line  Code:Full Code  DVT PPX    Shukrisavannah Lobato, APRN - CNP   5/13/2022 7:21 AM

## 2022-05-14 LAB
ANION GAP SERPL CALCULATED.3IONS-SCNC: 15 MMOL/L (ref 3–16)
BUN BLDV-MCNC: 76 MG/DL (ref 7–20)
CALCIUM SERPL-MCNC: 8.9 MG/DL (ref 8.3–10.6)
CHLORIDE BLD-SCNC: 97 MMOL/L (ref 99–110)
CO2: 25 MMOL/L (ref 21–32)
CREAT SERPL-MCNC: 6.5 MG/DL (ref 0.6–1.2)
GFR AFRICAN AMERICAN: 8
GFR NON-AFRICAN AMERICAN: 6
GLUCOSE BLD-MCNC: 192 MG/DL (ref 70–99)
GLUCOSE BLD-MCNC: 215 MG/DL (ref 70–99)
GLUCOSE BLD-MCNC: 225 MG/DL (ref 70–99)
GLUCOSE BLD-MCNC: 236 MG/DL (ref 70–99)
GLUCOSE BLD-MCNC: 238 MG/DL (ref 70–99)
GLUCOSE BLD-MCNC: 255 MG/DL (ref 70–99)
GLUCOSE BLD-MCNC: 263 MG/DL (ref 70–99)
HCT VFR BLD CALC: 24.6 % (ref 36–48)
HEMOGLOBIN: 7.6 G/DL (ref 12–16)
MAGNESIUM: 1.9 MG/DL (ref 1.8–2.4)
MCH RBC QN AUTO: 29.4 PG (ref 26–34)
MCHC RBC AUTO-ENTMCNC: 31.1 G/DL (ref 31–36)
MCV RBC AUTO: 94.8 FL (ref 80–100)
PDW BLD-RTO: 19 % (ref 12.4–15.4)
PERFORMED ON: ABNORMAL
PHOSPHORUS: 4.8 MG/DL (ref 2.5–4.9)
PLATELET # BLD: 272 K/UL (ref 135–450)
PMV BLD AUTO: 7.7 FL (ref 5–10.5)
POTASSIUM SERPL-SCNC: 4.2 MMOL/L (ref 3.5–5.1)
RBC # BLD: 2.6 M/UL (ref 4–5.2)
SODIUM BLD-SCNC: 137 MMOL/L (ref 136–145)
WBC # BLD: 7.5 K/UL (ref 4–11)

## 2022-05-14 PROCEDURE — C9113 INJ PANTOPRAZOLE SODIUM, VIA: HCPCS | Performed by: SURGERY

## 2022-05-14 PROCEDURE — 80048 BASIC METABOLIC PNL TOTAL CA: CPT

## 2022-05-14 PROCEDURE — 84100 ASSAY OF PHOSPHORUS: CPT

## 2022-05-14 PROCEDURE — 94761 N-INVAS EAR/PLS OXIMETRY MLT: CPT

## 2022-05-14 PROCEDURE — 99024 POSTOP FOLLOW-UP VISIT: CPT | Performed by: SURGERY

## 2022-05-14 PROCEDURE — 6360000002 HC RX W HCPCS: Performed by: SURGERY

## 2022-05-14 PROCEDURE — 94640 AIRWAY INHALATION TREATMENT: CPT

## 2022-05-14 PROCEDURE — 6370000000 HC RX 637 (ALT 250 FOR IP): Performed by: STUDENT IN AN ORGANIZED HEALTH CARE EDUCATION/TRAINING PROGRAM

## 2022-05-14 PROCEDURE — 6360000002 HC RX W HCPCS: Performed by: NURSE PRACTITIONER

## 2022-05-14 PROCEDURE — 2500000003 HC RX 250 WO HCPCS: Performed by: NURSE PRACTITIONER

## 2022-05-14 PROCEDURE — 2580000003 HC RX 258: Performed by: SURGERY

## 2022-05-14 PROCEDURE — 6370000000 HC RX 637 (ALT 250 FOR IP): Performed by: INTERNAL MEDICINE

## 2022-05-14 PROCEDURE — APPNB30 APP NON BILLABLE TIME 0-30 MINS: Performed by: NURSE PRACTITIONER

## 2022-05-14 PROCEDURE — 83735 ASSAY OF MAGNESIUM: CPT

## 2022-05-14 PROCEDURE — 6360000002 HC RX W HCPCS: Performed by: STUDENT IN AN ORGANIZED HEALTH CARE EDUCATION/TRAINING PROGRAM

## 2022-05-14 PROCEDURE — 6370000000 HC RX 637 (ALT 250 FOR IP): Performed by: NURSE PRACTITIONER

## 2022-05-14 PROCEDURE — 2700000000 HC OXYGEN THERAPY PER DAY

## 2022-05-14 PROCEDURE — APPSS15 APP SPLIT SHARED TIME 0-15 MINUTES: Performed by: NURSE PRACTITIONER

## 2022-05-14 PROCEDURE — 6370000000 HC RX 637 (ALT 250 FOR IP): Performed by: SURGERY

## 2022-05-14 PROCEDURE — 85027 COMPLETE CBC AUTOMATED: CPT

## 2022-05-14 PROCEDURE — 2060000000 HC ICU INTERMEDIATE R&B

## 2022-05-14 PROCEDURE — 36592 COLLECT BLOOD FROM PICC: CPT

## 2022-05-14 RX ORDER — ALBUTEROL SULFATE 2.5 MG/3ML
2.5 SOLUTION RESPIRATORY (INHALATION) 2 TIMES DAILY
Status: DISCONTINUED | OUTPATIENT
Start: 2022-05-14 | End: 2022-05-21 | Stop reason: HOSPADM

## 2022-05-14 RX ORDER — DIPHENHYDRAMINE HYDROCHLORIDE, ZINC ACETATE 2; .1 G/100G; G/100G
CREAM TOPICAL 3 TIMES DAILY PRN
Status: DISCONTINUED | OUTPATIENT
Start: 2022-05-14 | End: 2022-05-21 | Stop reason: HOSPADM

## 2022-05-14 RX ORDER — INSULIN LISPRO 100 [IU]/ML
0-18 INJECTION, SOLUTION INTRAVENOUS; SUBCUTANEOUS EVERY 4 HOURS
Status: DISCONTINUED | OUTPATIENT
Start: 2022-05-14 | End: 2022-05-14

## 2022-05-14 RX ORDER — BISACODYL 10 MG
10 SUPPOSITORY, RECTAL RECTAL DAILY
Status: DISCONTINUED | OUTPATIENT
Start: 2022-05-14 | End: 2022-05-21 | Stop reason: HOSPADM

## 2022-05-14 RX ORDER — MAGNESIUM SULFATE 1 G/100ML
1000 INJECTION INTRAVENOUS ONCE
Status: COMPLETED | OUTPATIENT
Start: 2022-05-14 | End: 2022-05-14

## 2022-05-14 RX ORDER — INSULIN LISPRO 100 [IU]/ML
0-18 INJECTION, SOLUTION INTRAVENOUS; SUBCUTANEOUS EVERY 4 HOURS
Status: DISCONTINUED | OUTPATIENT
Start: 2022-05-14 | End: 2022-05-21 | Stop reason: HOSPADM

## 2022-05-14 RX ORDER — INSULIN GLARGINE 100 [IU]/ML
20 INJECTION, SOLUTION SUBCUTANEOUS NIGHTLY
Status: DISCONTINUED | OUTPATIENT
Start: 2022-05-14 | End: 2022-05-15

## 2022-05-14 RX ADMIN — INSULIN LISPRO 2 UNITS: 100 INJECTION, SOLUTION INTRAVENOUS; SUBCUTANEOUS at 06:07

## 2022-05-14 RX ADMIN — MORPHINE SULFATE 4 MG: 4 INJECTION INTRAVENOUS at 09:40

## 2022-05-14 RX ADMIN — Medication 10 ML: at 15:40

## 2022-05-14 RX ADMIN — INSULIN LISPRO 4 UNITS: 100 INJECTION, SOLUTION INTRAVENOUS; SUBCUTANEOUS at 01:10

## 2022-05-14 RX ADMIN — INSULIN GLARGINE 20 UNITS: 100 INJECTION, SOLUTION SUBCUTANEOUS at 20:11

## 2022-05-14 RX ADMIN — ALBUTEROL SULFATE 2.5 MG: 2.5 SOLUTION RESPIRATORY (INHALATION) at 16:12

## 2022-05-14 RX ADMIN — PANTOPRAZOLE SODIUM 20 MG: 40 INJECTION, POWDER, FOR SOLUTION INTRAVENOUS at 09:28

## 2022-05-14 RX ADMIN — SODIUM CHLORIDE, PRESERVATIVE FREE 10 ML: 5 INJECTION INTRAVENOUS at 00:31

## 2022-05-14 RX ADMIN — METOPROLOL TARTRATE 25 MG: 25 TABLET, FILM COATED ORAL at 21:36

## 2022-05-14 RX ADMIN — DIPHENHYDRAMINE HYDROCHLORIDE 25 MG: 50 INJECTION, SOLUTION INTRAMUSCULAR; INTRAVENOUS at 09:28

## 2022-05-14 RX ADMIN — Medication 10 MG: at 21:36

## 2022-05-14 RX ADMIN — ALBUTEROL SULFATE 2.5 MG: 2.5 SOLUTION RESPIRATORY (INHALATION) at 09:30

## 2022-05-14 RX ADMIN — MAGNESIUM SULFATE HEPTAHYDRATE 1000 MG: 1 INJECTION, SOLUTION INTRAVENOUS at 15:36

## 2022-05-14 RX ADMIN — MORPHINE SULFATE 2 MG: 2 INJECTION, SOLUTION INTRAMUSCULAR; INTRAVENOUS at 15:37

## 2022-05-14 RX ADMIN — DIPHENHYDRAMINE HYDROCHLORIDE, ZINC ACETATE: 2; .1 CREAM TOPICAL at 15:50

## 2022-05-14 RX ADMIN — HEPARIN SODIUM 5000 UNITS: 5000 INJECTION INTRAVENOUS; SUBCUTANEOUS at 20:11

## 2022-05-14 RX ADMIN — Medication 10 ML: at 21:42

## 2022-05-14 RX ADMIN — Medication 10 ML: at 20:11

## 2022-05-14 RX ADMIN — INSULIN LISPRO 6 UNITS: 100 INJECTION, SOLUTION INTRAVENOUS; SUBCUTANEOUS at 15:34

## 2022-05-14 RX ADMIN — QUETIAPINE FUMARATE 12.5 MG: 25 TABLET ORAL at 21:35

## 2022-05-14 RX ADMIN — INSULIN LISPRO 4 UNITS: 100 INJECTION, SOLUTION INTRAVENOUS; SUBCUTANEOUS at 09:29

## 2022-05-14 RX ADMIN — ALBUTEROL SULFATE 2.5 MG: 2.5 SOLUTION RESPIRATORY (INHALATION) at 20:48

## 2022-05-14 RX ADMIN — HYDROMORPHONE HYDROCHLORIDE 0.5 MG: 1 INJECTION, SOLUTION INTRAMUSCULAR; INTRAVENOUS; SUBCUTANEOUS at 00:30

## 2022-05-14 RX ADMIN — INSULIN LISPRO 9 UNITS: 100 INJECTION, SOLUTION INTRAVENOUS; SUBCUTANEOUS at 20:11

## 2022-05-14 RX ADMIN — HEPARIN SODIUM 5000 UNITS: 5000 INJECTION INTRAVENOUS; SUBCUTANEOUS at 15:38

## 2022-05-14 RX ADMIN — LEUCINE, PHENYLALANINE, LYSINE, METHIONINE, ISOLEUCINE, VALINE, HISTIDINE, THREONINE, TRYPTOPHAN, ALANINE, GLYCINE, ARGININE, PROLINE, SERINE, TYROSINE, SODIUM ACETATE, DIBASIC POTASSIUM PHOSPHATE, MAGNESIUM CHLORIDE, SODIUM CHLORIDE, CALCIUM CHLORIDE, DEXTROSE
365; 280; 290; 200; 300; 290; 240; 210; 90; 1035; 515; 575; 340; 250; 20; 340; 261; 51; 59; 33; 20 INJECTION INTRAVENOUS at 18:06

## 2022-05-14 RX ADMIN — HEPARIN SODIUM 5000 UNITS: 5000 INJECTION INTRAVENOUS; SUBCUTANEOUS at 09:28

## 2022-05-14 ASSESSMENT — PAIN SCALES - WONG BAKER
WONGBAKER_NUMERICALRESPONSE: 0

## 2022-05-14 ASSESSMENT — PAIN SCALES - GENERAL
PAINLEVEL_OUTOF10: 10
PAINLEVEL_OUTOF10: 10
PAINLEVEL_OUTOF10: 0

## 2022-05-14 ASSESSMENT — PAIN DESCRIPTION - PAIN TYPE: TYPE: ACUTE PAIN

## 2022-05-14 ASSESSMENT — PAIN DESCRIPTION - LOCATION
LOCATION: ABDOMEN
LOCATION: ABDOMEN

## 2022-05-14 ASSESSMENT — PAIN - FUNCTIONAL ASSESSMENT: PAIN_FUNCTIONAL_ASSESSMENT: ACTIVITIES ARE NOT PREVENTED

## 2022-05-14 ASSESSMENT — PAIN DESCRIPTION - DESCRIPTORS: DESCRIPTORS: ACHING

## 2022-05-14 NOTE — PROGRESS NOTES
Trach care done. DIC and drain gauze changed. Site was cleansed and dried. Passy manolo placed back on patient and cuff is deflated. Water changed on cool mist aerosol and drain bag emptied. Patient tolerated well.

## 2022-05-14 NOTE — RT PROTOCOL NOTE
RT Nebulizer Bronchodilator Protocol Note    There is a bronchodilator order in the chart from a provider indicating to follow the RT Bronchodilator Protocol and there is an Initiate RT Bronchodilator Protocol order as well (see protocol at bottom of note). CXR Findings:  No results found. The findings from the last RT Protocol Assessment were as follows:  Smoking: Chronic pulmonary disease (Tach pt not COPD and Pulmonary HTN)  Respiratory Pattern: Regular pattern and RR 12-20 bpm  Breath Sounds: Slightly diminished and/or crackles  Cough: Strong, productive  Indication for Bronchodilator Therapy: Decreased or absent breath sounds  Bronchodilator Assessment Score: 5    Aerosolized bronchodilator medication orders have been revised according to the RT Nebulizer Bronchodilator Protocol below. Respiratory Therapist to perform RT Therapy Protocol Assessment initially then follow the protocol. Repeat RT Therapy Protocol Assessment PRN for score 0-3 or on second treatment, BID, and PRN for scores above 3. No Indications - adjust the frequency to every 6 hours PRN wheezing or bronchospasm, if no treatments needed after 48 hours then discontinue using Per Protocol order mode. If indication present, adjust the RT bronchodilator orders based on the Bronchodilator Assessment Score as indicated below. If a patient is on this medication at home then do not decrease Frequency below that used at home. 0-3 - enter or revise RT bronchodilator order(s) to equivalent RT Bronchodilator order with Frequency of every 4 hours PRN for wheezing or increased work of breathing using Per Protocol order mode. 4-6 - enter or revise RT Bronchodilator order(s) to two equivalent RT bronchodilator orders with one order with BID Frequency and one order with Frequency of every 4 hours PRN wheezing or increased work of breathing using Per Protocol order mode.          7-10 - enter or revise RT Bronchodilator order(s) to two equivalent RT bronchodilator orders with one order with TID Frequency and one order with Frequency of every 4 hours PRN wheezing or increased work of breathing using Per Protocol order mode. 11-13 - enter or revise RT Bronchodilator order(s) to one equivalent RT bronchodilator order with QID Frequency and an Albuterol order with Frequency of every 4 hours PRN wheezing or increased work of breathing using Per Protocol order mode. Greater than 13 - enter or revise RT Bronchodilator order(s) to one equivalent RT bronchodilator order with every 4 hours Frequency and an Albuterol order with Frequency of every 2 hours PRN wheezing or increased work of breathing using Per Protocol order mode. RT to enter RT Home Evaluation for COPD & MDI Assessment order using Per Protocol order mode.     Electronically signed by Elizabeth Mackey RCP on 5/14/2022 at 3:37 AM

## 2022-05-14 NOTE — PROGRESS NOTES
[facility] PROGRESS NOTE    5/14/2022 7:58 AM        Name: Ashley Edgar . Admitted: 5/6/2022  Primary Care Provider: Sav Cortez MD (Tel: [phone])      Subjective: Jair Clarke     Pt seen this am with nurse at bedside  Wants to \"rest\" at the present time  NG output is decreasing today ,    Reports adequate pain control this am    Was liberated from the vent on POD # 1 ( 5/11/22)      POD # 4 from [address] with VIC for SBO     Reviewed interval ancillary notes    Current Medications  albuterol (PROVENTIL) nebulizer solution 2.5 mg, BID  PN-Adult Premix 5/20 - Standard Electrolytes - Central Line, Continuous TPN  insulin glargine (LANTUS) injection vial 15 Units, Nightly  0.9 % sodium chloride infusion, PRN  albuterol (PROVENTIL) nebulizer solution 2.5 mg, Q4H PRN  0.9 % sodium chloride infusion, PRN  insulin lispro (HUMALOG) injection vial 0-12 Units, Q4H  [START ON 5/19/2022] fat emulsion 20 % infusion 250 mL, Once per day on Mon Thu  epoetin claire-epbx (RETACRIT) injection 10,000 Units, Once per day on Tue Thu Sat  morphine (PF) injection 2 mg, Q2H PRN   Or  morphine injection 4 mg, Q2H PRN  metoprolol tartrate (LOPRESSOR) tablet 25 mg, BID  QUEtiapine (SEROQUEL) tablet 12.5 mg, Nightly  pantoprazole (PROTONIX) injection 20 mg, Daily  metoprolol (LOPRESSOR) injection 5 mg, Q6H PRN  HYDROmorphone HCl PF (DILAUDID) injection 0.5 mg, Q3H PRN  LORazepam (ATIVAN) injection 0.5 mg, Q12H PRN  sodium chloride flush 0.9 % injection 5-40 mL, 2 times per day  0.9 % sodium chloride infusion, PRN  ondansetron (ZOFRAN-ODT) disintegrating tablet 4 mg, Q8H PRN   Or  ondansetron (ZOFRAN) injection 4 mg, Q6H PRN  acetaminophen (TYLENOL) tablet 650 mg, Q6H PRN   Or  acetaminophen (TYLENOL) suppository 650 mg, Q6H PRN  sodium chloride flush 0.9 % injection 5-40 mL, 2 times per day  sodium chloride flush 0.9 % injection 5-40 mL, PRN  0.9 % sodium chloride infusion, PRN  heparin (porcine) injection 5,000 Units, TID  polyethylene glycol (GLYCOLAX) packet 17 g, Daily PRN  glucose (GLUTOSE) 40 % oral gel 15 g, PRN  glucagon (rDNA) injection 1 mg, PRN  dextrose 5 % solution, PRN  dextrose bolus 10% 125 mL, PRN   Or  dextrose bolus 10% 250 mL, PRN  heparin (porcine) injection 1,000 Units, Once  diphenhydrAMINE (BENADRYL) injection 25 mg, Q6H PRN        Objective:  BP (!) 114/50   Pulse 85   Temp 97.5 °F (36.4 °C) (Oral)   Resp 18   Ht 5' 4\" (1.626 m)   Wt 268 lb 11.2 oz (121.9 kg)   SpO2 100%   BMI 46.12 kg/m²     Intake/Output Summary (Last 24 hours) at 5/14/2022 0758  Last data filed at 5/14/2022 0600  Gross per 24 hour   Intake 2914.11 ml   Output 2100 ml   Net 814.11 ml      Wt Readings from Last 3 Encounters:   05/14/22 268 lb 11.2 oz (121.9 kg)   05/01/22 (!) 309 lb 8 oz (140.4 kg)   04/26/22 291 lb (132 kg)       General appearance:  Appears chronically ill and obese. Awake, she is alert and pleasant, but wants to rest, morbidly obese  Eyes: Sclera clear. Pupils equal.  ENT: Moist oral mucosa. Trachea midline, no adenopathy. Cardiovascular: Regular rhythm, normal S1, S2. No murmur. No edema in lower extremities  Respiratory: Not using accessory muscles. trach collar in place with 8 liters of oxygen  , no ronchi or wheezing. Decreased in bases   GI: Abdomen softly distended and obese with absent bowel sounds. NG to suction with green drainage. abd dressing is clean dry intact.   abd binder is in place    Musculoskeletal: No cyanosis in digits, neck supple  Neurology: moving arms and her head, sedation is being weaned   Psych: sedated   Skin: Warm, dry, poor  Turgor, scratch marks notes on chest and arms     Labs and Tests:  CBC:   Recent Labs     05/12/22  0337 05/12/22  1515 05/13/22  0530 05/13/22  1830 05/14/22  0700   WBC 6.2  --  7.8  --  7.5   HGB 6.8*   < > 6.9* 7.8* 7.6*     --  288  --  272    < > = values in this interval not displayed. BMP:    Recent Labs     05/12/22  0337 05/13/22  0530 05/14/22  0700    140 137   K 3.6 3.5 4.2   CL 94* 98* 97*   CO2 26 25 25   BUN 52* 57* 76*   CREATININE 6.5* 6.0* 6.5*   GLUCOSE 183* 206* 236*     Hepatic:   Recent Labs     05/12/22  0337   AST <5*   ALT <5*   BILITOT 0.6   ALKPHOS 68       1. Worsening partial small bowel obstruction. Problem List  Principal Problem:    SBO (small bowel obstruction) ([facility])  Active Problems:    Fecal impaction ([facility])    ESRD on dialysis ([facility] [address]    Chronic respiratory failure requiring continuous mechanical ventilation through tracheostomy ([facility] [address]    History of CVA (cerebrovascular accident)    Anemia, chronic disease    Chronic diastolic heart failure ([facility] [address]    Essential hypertension    GERD (gastroesophageal reflux disease)    Morbid obesity with BMI of 50.0-59.9, adult ([facility])    SILVER (obstructive sleep apnea)  Resolved Problems:    * No resolved hospital problems. *       Assessment & Plan:   1. SBO: POD # 4 from Exp Lap with VIC: GS following,  Pt is bed bound and has not been able to walk for several years. 2. PAF;  Now in SR,  On BB, rate in the 80's    3. ESRD:  On HD q [facility], Saturday,  Nephrology is following  4. Hx of respiratory failure:  S/p trach > 1 year ago. She has been extubated, currently weaned down to 6 liters   5. T2DM:  Currently controlled with AIC 5.4 continue with humalog correction as needed   6. Hx of DVT: coumadin on hold , will discuss with surgery when we can possibly added SC heparin  7. Malnutrition: TPN started 5/11  8. Bilateral renal cysts per CT scan:  Needs dedicated MRI of kidney per renal mass protocol to exclude neoplasm   9. Acute on chronic anemia:  on retacrit , Hgb 7.6  She has had 2 units post operatively    10.  Anticipate eventual d/c back to [facility] in [place] where she is a permanent resident       Diet: Diet NPO Exceptions are: Ice Chips, Sips of Water with Meds, Sips of Clear Liquids, Popsicles  PN-Adult Premix 5/20 - Standard Electrolytes - Central Line  Code:Full Code  DVT PPX    Dg Patel, [place]AL[place] - CNP   5/14/2022 7:58 AM

## 2022-05-14 NOTE — PROGRESS NOTES
Nephrology Progress Note  [facility]  [phone]   [facility]. [facility]    Patient:  Syl Benítez   : 1950    Reason for Consultation : ESRD on HD     Brief HPI   Mrs. Zraa Acevedo is a 81 yo woman with h/o ESRD on HD TTS, chronic resp failure s/p trach, h/o CVA, morbid obesity, DM II, Pulm HTN, Afib on AC was admitted for abdominal pain, nausea and emesis.    She was sent from [facility] for sob and the above symptoms  Imaging on admission showed   partial small bowel obstruction, with a discrete transition   point noted adjacent to hernia mesh within the anterior lower abdomen     Subjective/Interval history  Pt seen and examined  NGT remains  BPs controlled  Has been afebrile  More awake and interactive today      Meds:  Scheduled Meds:   albuterol  2.5 mg Nebulization BID    bisacodyl  10 mg Rectal Daily    insulin lispro  0-18 Units SubCUTAneous Q4H    insulin glargine  20 Units SubCUTAneous Nightly    magnesium sulfate  1,000 mg IntraVENous Once    [START ON 2022] fat emulsion  250 mL IntraVENous Once per day on u    epoetin claire-epbx  10,000 Units IntraVENous Once per day on  Sat    metoprolol tartrate  25 mg Oral BID    QUEtiapine  12.5 mg Oral Nightly    pantoprazole  20 mg IntraVENous Daily    sodium chloride flush  5-40 mL IntraVENous 2 times per day    sodium chloride flush  5-40 mL IntraVENous 2 times per day    heparin (porcine)  5,000 Units SubCUTAneous TID    heparin (porcine)  1,000 Units IntraVENous Once     Continuous Infusions:   PN-Adult Premix 5/20 - Standard Electrolytes - Central Line      PN-Adult Premix 5/20 - Standard Electrolytes - Central Line 83 mL/hr at 22 1850    sodium chloride      sodium chloride      sodium chloride Stopped (22 1453)    sodium chloride Stopped (22 1746)    dextrose       PRN Meds:.diphenhydrAMINE-zinc acetate, sodium chloride, albuterol, sodium chloride, morphine **OR** morphine, metoprolol, LORazepam, sodium chloride, ondansetron **OR** ondansetron, acetaminophen **OR** acetaminophen, sodium chloride flush, sodium chloride, polyethylene glycol, glucose, glucagon (rDNA), dextrose, dextrose bolus **OR** dextrose bolus, diphenhydrAMINE    Vitals:  /68   Pulse 94   Temp 97.9 °F (36.6 °C) (Axillary)   Resp 18   Ht 5' 4\" (1.626 m)   Wt 268 lb 11.2 oz (121.9 kg)   SpO2 100%   BMI 46.12 kg/m²     Physical Exam:  Gen: on TC, not in resp distress, resting in bed  HEENT: PERRL, no palor, NGT+  CV: irregularly irregular, S1 S2+  Lungs: decreased breath sounds at the bases  Abd: abdominal binder+  Ext: Trace bilateral LE edema, no cyanosis  Access: Left upper arm AV graft +bruit. Labs:  CBC:   Lab Results   Component Value Date    WBC 7.5 05/14/2022    RBC 2.60 05/14/2022    HGB 7.6 05/14/2022    HCT 24.6 05/14/2022    MCV 94.8 05/14/2022    MCH 29.4 05/14/2022    MCHC 31.1 05/14/2022    RDW 19.0 05/14/2022     05/14/2022    MPV 7.7 05/14/2022     BMP:    Lab Results   Component Value Date     05/14/2022    K 4.2 05/14/2022    K 4.7 05/06/2022    CL 97 05/14/2022    CO2 25 05/14/2022    BUN 76 05/14/2022    LABALBU 3.3 05/12/2022    CREATININE 6.5 05/14/2022    CALCIUM 8.9 05/14/2022    GFRAA 8 05/14/2022    LABGLOM 6 05/14/2022    GLUCOSE 236 05/14/2022       Assessment/Plan:    ESRD   on HD TTS at [facility]  -last outpatient HD on 5/3  -missed 5/5  -Got HD 5/6 x 2 hours  - HD back to Tues/Thurs/Sat schedule.   She cut her treatment off after 50 minutes Saturday and declines dialysis today  Next HD in AM    Hyperkalemia mild, improved     Small bowel obstruction high grade  S/p ex-lap, VIC, hernia reduced 5/10     Vaginal bleeding  Possibly from fibroid uterus     Anemia in CKD  Hb below goal  Continue ESAs with HD    Chronic resp failure: s/p trach  Was on vent post-op, back on TC    Renal cysts bilateral  Left renal lesion  Needs further imaging to characterize    FEN  On TPN    Blanquita Lynch MD  [facility]  Office Number: [email or web address]

## 2022-05-14 NOTE — PROGRESS NOTES
[facility]        Progress Note    Pt Name: Ethan Bae  MRN: [id number]  [place]: 1950  Date of evaluation: 2022    Subjective:    No acute events overnight, awake and talking  Remains off ventilator  Pain controlled but complains of generalized pruritis  No nausea or vomiting, NGT in place with high output  Reports flatus but no stool  Resting in bed at this time    Postoperative Day #4      Vital Signs:  Patient Vitals for the past 24 hrs:   BP Temp Temp src Pulse Resp SpO2 Weight   22 1145 -- -- -- -- 18 100 % --   22 0948 -- -- -- -- 18 96 % --   22 0940 -- -- -- -- 18 -- --   22 0801 112/63 98.3 °F (36.8 °C) Oral 89 20 100 % --   22 0402 (!) 114/50 97.5 °F (36.4 °C) Oral 85 18 100 % 268 lb 11.2 oz (121.9 kg)   22 0019 113/72 98.7 °F (37.1 °C) Oral 83 18 100 % --   22 2310 -- -- -- -- 18 100 % --   22 213 -- -- -- 93 -- -- --   22 -- -- -- -- 18 100 % --   22 1936 136/63 98.1 °F (36.7 °C) Axillary 90 16 100 % --   22 1817 (!) 142/79 98 °F (36.7 °C) Axillary 86 16 100 % --   22 1715 137/77 98 °F (36.7 °C) Axillary 87 16 100 % --   22 1517 -- -- -- -- 16 -- --   22 1430 125/68 98 °F (36.7 °C) Axillary 89 16 100 % --   22 1355 108/69 98.1 °F (36.7 °C) Axillary 88 16 100 % --      TEMPERATURE HISTORY 24H: Temp (24hrs), Av.1 °F (36.7 °C), Min:97.5 °F (36.4 °C), Max:98.7 °F (37.1 °C)    BLOOD PRESSURE HISTORY: Systolic (41PTJ), JOE:794 , Min:106 , YQZ:115    Diastolic (68PKK), OVQ:86, Min:50, Max:79      Intake/Output:    I/O last 3 completed shifts: In: 2914.1 [P.O.:448.7; I.V.:60.2; Blood:325; IV Piggyback:110]  Out: 3100 [Emesis/NG output:3100]  No intake/output data recorded.   Drain/tube Output:         Physical Exam:  General: awake, alert, trach collar, interacts appropriately  Pulmonary: unlabored respirations  Abdomen: soft, mild distension, appropriate incisional tenderness only, bowel sounds present  Skin/Wound: PREM dressing in place, seal intact, pump functioning    Labs:  CBC:    Recent Labs     05/12/22  0337 05/12/22  1515 05/13/22  0530 05/13/22  1830 05/14/22  0700   WBC 6.2  --  7.8  --  7.5   HGB 6.8*   < > 6.9* 7.8* 7.6*   HCT 22.0*   < > 23.1* 24.4* 24.6*     --  288  --  272    < > = values in this interval not displayed. BMP:    Recent Labs     05/12/22  0337 05/13/22  0530 05/14/22  0700    140 137   K 3.6 3.5 4.2   CL 94* 98* 97*   CO2 26 25 25   BUN 52* 57* 76*   CREATININE 6.5* 6.0* 6.5*   GLUCOSE 183* 206* 236*     Hepatic:   Recent Labs     05/12/22 0337   AST <5*   ALT <5*   BILITOT 0.6   ALKPHOS 68     Amylase: No results for input(s): AMYLASE in the last 72 hours. Lipase: No results for input(s): LIPASE in the last 72 hours. Mag:      Recent Labs     05/12/22  0337 05/13/22  0530 05/14/22  0700   MG 1.80 1.80 1.90      Phos:     Recent Labs     05/12/22  0337 05/13/22  0530 05/14/22  0700   PHOS 4.5 3.7 4.8      Coags: No results for input(s): INR, APTT in the last 72 hours. Cultures:  Relevant cultures documented under results section     Pathology:  No relevant pathology    Imaging:  I have personally reviewed the following films:    No results found.     Scheduled Meds:   albuterol  2.5 mg Nebulization BID    bisacodyl  10 mg Rectal Daily    insulin glargine  15 Units SubCUTAneous Nightly    insulin lispro  0-12 Units SubCUTAneous Q4H    [START ON 5/19/2022] fat emulsion  250 mL IntraVENous Once per day on Mon Thu    epoetin claire-epbx  10,000 Units IntraVENous Once per day on Tue Thu Sat    metoprolol tartrate  25 mg Oral BID    QUEtiapine  12.5 mg Oral Nightly    pantoprazole  20 mg IntraVENous Daily    sodium chloride flush  5-40 mL IntraVENous 2 times per day    sodium chloride flush  5-40 mL IntraVENous 2 times per day    heparin (porcine)  5,000 Units SubCUTAneous TID    heparin (porcine)  1,000 Units IntraVENous Once     Continuous Infusions:   PN-Adult Premix 5/20 - Standard Electrolytes - Central Line 83 mL/hr at 05/13/22 1850    sodium chloride      sodium chloride      sodium chloride Stopped (05/13/22 1453)    sodium chloride Stopped (05/12/22 1746)    dextrose       PRN Meds:.diphenhydrAMINE-zinc acetate, sodium chloride, albuterol, sodium chloride, morphine **OR** morphine, metoprolol, LORazepam, sodium chloride, ondansetron **OR** ondansetron, acetaminophen **OR** acetaminophen, sodium chloride flush, sodium chloride, polyethylene glycol, glucose, glucagon (rDNA), dextrose, dextrose bolus **OR** dextrose bolus, diphenhydrAMINE      Assessment:  OR Date 5/10/2022, exploratory laparotomy with lysis of adhesions for small bowel obstruction  Fecal impaction  Chronic respiratory failure on trach/vent  Hypertension  Diabetes  End-stage renal disease on hemodialysis  Paroxysmal atrial fibrillation  History of DVT, on Coumadin  Vaginal bleeding  Morbid obesity, BMI 44.3      Plan:  1. Abdominal exam appropriate, passing flatus but no stool, NGT output remains high, PREM dressing intact--keep in place; continued supportive care, complains of generalized pruritis--Benadryl cream PRN  2. NPO with NGT decompression; monitor bowel function--repeat Dulcolax suppository daily, give soap suds enema  3. IV hydration and electrolyte correction per nephrology  4. Activity as tolerated  5. PRN analgesics and antiemetics--minimizing narcotics as tolerated  6. DVT prophylaxis with heparin injections; hold oral anticoagulation  7. Management of medical comorbid etiologies per primary team and consulting services    EDUCATION:  Educated patient on plan of care and disease process--all questions answered. Plans discussed with patient and nursing. Reviewed and discussed with Dr. De La Fuente.       Signed:  ITALO Bob - CNP  5/14/2022 12:19 PM     Surgery Staff:     Pt seen and examined with NP  See full note above  Stable, but NG remains high, will leave in place today  Add Laxatives, decrease narcotics  Unfortunately not able to move much, and can not get OOB  Cont TPN  Add Benadryl cream for itch    Davi Santacruz MD

## 2022-05-14 NOTE — PROGRESS NOTES
[address]  Patient has size 6  XLT . Trach Kit of same size on standby  Yes, Obturator at head of the bed  Yes. Inner cannula cleaned/replaced Yes, drain sponge changed  Yes, Trach tie replaced is soiled No, Flange cleaned  Yes. [address] performed without complications. Comment: Small dark green drainage around the trach site.

## 2022-05-14 NOTE — PROGRESS NOTES
1 small mucous BM this morning. Soap suds enema given. Bowel movement after. Small brown and formed x2. Large amount of [employer] water returned. Urine x1    Dialysis tomorrow. CHG bath. Interdry under [place] and bilateral breast.     Abd binder break started at 1800 related to skin tear on the side of the right breast     Complete linen change. Teeth brush, face washed, lip moisturized this shift. Daughter updated. Report given to [facility].

## 2022-05-14 NOTE — PROGRESS NOTES
Clinical Pharmacy Note    Pharmacy consulted to manage TPN - discussed with surgery, plan to continue for now. Current TPN rate: 83 ml/hr  Goal TPN rate: 83 ml/hr    Labs:  General Labs:  BMP:    Lab Results   Component Value Date     05/14/2022    K 4.2 05/14/2022    K 4.7 05/06/2022    CL 97 05/14/2022    CO2 25 05/14/2022    BUN 76 05/14/2022    LABALBU 3.3 05/12/2022    CREATININE 6.5 05/14/2022    CALCIUM 8.9 05/14/2022    GFRAA 8 05/14/2022    LABGLOM 6 05/14/2022    GLUCOSE 236 05/14/2022     Blood sugars: 195-263    Electrolyte replacement as follows:   Replace magnesium with 1 g of magnesium sulfate administered IV over 1 hours    Blood sugar management:  Plan to increase q4 hour Humalog to high dose sliding scale. Also increased lantus 15 units nightly to 20 units nightly as blood sugars continue to trend up. Plan to continue TPN at goal of 83 ml/hr. Thank you for allowing pharmacy to participate in the care of this patient.     Alana Kearney, [facility], PharmD 5/14/2022

## 2022-05-15 ENCOUNTER — APPOINTMENT (OUTPATIENT)
Dept: GENERAL RADIOLOGY | Age: 72
DRG: 335 | End: 2022-05-15
Payer: COMMERCIAL

## 2022-05-15 LAB
ANION GAP SERPL CALCULATED.3IONS-SCNC: 18 MMOL/L (ref 3–16)
BUN BLDV-MCNC: 98 MG/DL (ref 7–20)
CALCIUM SERPL-MCNC: 9.4 MG/DL (ref 8.3–10.6)
CHLORIDE BLD-SCNC: 96 MMOL/L (ref 99–110)
CO2: 23 MMOL/L (ref 21–32)
CREAT SERPL-MCNC: 7.4 MG/DL (ref 0.6–1.2)
GFR AFRICAN AMERICAN: 7
GFR NON-AFRICAN AMERICAN: 5
GLUCOSE BLD-MCNC: 203 MG/DL (ref 70–99)
GLUCOSE BLD-MCNC: 220 MG/DL (ref 70–99)
GLUCOSE BLD-MCNC: 231 MG/DL (ref 70–99)
GLUCOSE BLD-MCNC: 235 MG/DL (ref 70–99)
GLUCOSE BLD-MCNC: 253 MG/DL (ref 70–99)
GLUCOSE BLD-MCNC: 260 MG/DL (ref 70–99)
HCT VFR BLD CALC: 25.2 % (ref 36–48)
HEMOGLOBIN: 7.8 G/DL (ref 12–16)
MAGNESIUM: 2.2 MG/DL (ref 1.8–2.4)
MCH RBC QN AUTO: 29.1 PG (ref 26–34)
MCHC RBC AUTO-ENTMCNC: 30.8 G/DL (ref 31–36)
MCV RBC AUTO: 94.3 FL (ref 80–100)
PDW BLD-RTO: 19.5 % (ref 12.4–15.4)
PERFORMED ON: ABNORMAL
PHOSPHORUS: 5.2 MG/DL (ref 2.5–4.9)
PLATELET # BLD: 300 K/UL (ref 135–450)
PMV BLD AUTO: 7.8 FL (ref 5–10.5)
POTASSIUM SERPL-SCNC: 4.3 MMOL/L (ref 3.5–5.1)
RBC # BLD: 2.67 M/UL (ref 4–5.2)
SODIUM BLD-SCNC: 137 MMOL/L (ref 136–145)
WBC # BLD: 10.7 K/UL (ref 4–11)

## 2022-05-15 PROCEDURE — 94640 AIRWAY INHALATION TREATMENT: CPT

## 2022-05-15 PROCEDURE — 2500000003 HC RX 250 WO HCPCS: Performed by: STUDENT IN AN ORGANIZED HEALTH CARE EDUCATION/TRAINING PROGRAM

## 2022-05-15 PROCEDURE — 6370000000 HC RX 637 (ALT 250 FOR IP): Performed by: NURSE PRACTITIONER

## 2022-05-15 PROCEDURE — 83735 ASSAY OF MAGNESIUM: CPT

## 2022-05-15 PROCEDURE — 84100 ASSAY OF PHOSPHORUS: CPT

## 2022-05-15 PROCEDURE — 6360000002 HC RX W HCPCS: Performed by: SURGERY

## 2022-05-15 PROCEDURE — 6360000002 HC RX W HCPCS

## 2022-05-15 PROCEDURE — 6370000000 HC RX 637 (ALT 250 FOR IP): Performed by: SURGERY

## 2022-05-15 PROCEDURE — 6360000002 HC RX W HCPCS: Performed by: NURSE PRACTITIONER

## 2022-05-15 PROCEDURE — 80048 BASIC METABOLIC PNL TOTAL CA: CPT

## 2022-05-15 PROCEDURE — 2580000003 HC RX 258: Performed by: SURGERY

## 2022-05-15 PROCEDURE — 85027 COMPLETE CBC AUTOMATED: CPT

## 2022-05-15 PROCEDURE — 74018 RADEX ABDOMEN 1 VIEW: CPT

## 2022-05-15 PROCEDURE — APPNB30 APP NON BILLABLE TIME 0-30 MINS: Performed by: NURSE PRACTITIONER

## 2022-05-15 PROCEDURE — 99024 POSTOP FOLLOW-UP VISIT: CPT | Performed by: SURGERY

## 2022-05-15 PROCEDURE — 36592 COLLECT BLOOD FROM PICC: CPT

## 2022-05-15 PROCEDURE — 2060000000 HC ICU INTERMEDIATE R&B

## 2022-05-15 PROCEDURE — 94761 N-INVAS EAR/PLS OXIMETRY MLT: CPT

## 2022-05-15 PROCEDURE — APPSS15 APP SPLIT SHARED TIME 0-15 MINUTES: Performed by: NURSE PRACTITIONER

## 2022-05-15 PROCEDURE — 6370000000 HC RX 637 (ALT 250 FOR IP): Performed by: STUDENT IN AN ORGANIZED HEALTH CARE EDUCATION/TRAINING PROGRAM

## 2022-05-15 PROCEDURE — C9113 INJ PANTOPRAZOLE SODIUM, VIA: HCPCS | Performed by: SURGERY

## 2022-05-15 PROCEDURE — 2700000000 HC OXYGEN THERAPY PER DAY

## 2022-05-15 PROCEDURE — 90935 HEMODIALYSIS ONE EVALUATION: CPT

## 2022-05-15 RX ORDER — INSULIN GLARGINE 100 [IU]/ML
15 INJECTION, SOLUTION SUBCUTANEOUS 2 TIMES DAILY
Status: DISCONTINUED | OUTPATIENT
Start: 2022-05-15 | End: 2022-05-16

## 2022-05-15 RX ADMIN — PANTOPRAZOLE SODIUM 20 MG: 40 INJECTION, POWDER, FOR SOLUTION INTRAVENOUS at 12:47

## 2022-05-15 RX ADMIN — INSULIN LISPRO 6 UNITS: 100 INJECTION, SOLUTION INTRAVENOUS; SUBCUTANEOUS at 20:40

## 2022-05-15 RX ADMIN — LORAZEPAM 0.5 MG: 2 INJECTION INTRAMUSCULAR; INTRAVENOUS at 23:16

## 2022-05-15 RX ADMIN — LORAZEPAM 0.5 MG: 2 INJECTION INTRAMUSCULAR; INTRAVENOUS at 00:51

## 2022-05-15 RX ADMIN — SODIUM CHLORIDE, PRESERVATIVE FREE 10 ML: 5 INJECTION INTRAVENOUS at 05:42

## 2022-05-15 RX ADMIN — SODIUM CHLORIDE, PRESERVATIVE FREE 10 ML: 5 INJECTION INTRAVENOUS at 05:43

## 2022-05-15 RX ADMIN — HEPARIN SODIUM 5000 UNITS: 5000 INJECTION INTRAVENOUS; SUBCUTANEOUS at 12:48

## 2022-05-15 RX ADMIN — INSULIN LISPRO 6 UNITS: 100 INJECTION, SOLUTION INTRAVENOUS; SUBCUTANEOUS at 06:14

## 2022-05-15 RX ADMIN — DIPHENHYDRAMINE HYDROCHLORIDE 25 MG: 50 INJECTION, SOLUTION INTRAMUSCULAR; INTRAVENOUS at 21:24

## 2022-05-15 RX ADMIN — EPOETIN ALFA-EPBX 10000 UNITS: 10000 INJECTION, SOLUTION INTRAVENOUS; SUBCUTANEOUS at 08:39

## 2022-05-15 RX ADMIN — DIPHENHYDRAMINE HYDROCHLORIDE, ZINC ACETATE: 2; .1 CREAM TOPICAL at 20:40

## 2022-05-15 RX ADMIN — DIPHENHYDRAMINE HYDROCHLORIDE 25 MG: 50 INJECTION, SOLUTION INTRAMUSCULAR; INTRAVENOUS at 01:50

## 2022-05-15 RX ADMIN — INSULIN GLARGINE 15 UNITS: 100 INJECTION, SOLUTION SUBCUTANEOUS at 20:40

## 2022-05-15 RX ADMIN — METOPROLOL TARTRATE 25 MG: 25 TABLET, FILM COATED ORAL at 20:39

## 2022-05-15 RX ADMIN — MORPHINE SULFATE 4 MG: 4 INJECTION INTRAVENOUS at 05:37

## 2022-05-15 RX ADMIN — Medication 10 ML: at 15:40

## 2022-05-15 RX ADMIN — Medication 10 MG: at 15:40

## 2022-05-15 RX ADMIN — Medication 10 ML: at 20:39

## 2022-05-15 RX ADMIN — MORPHINE SULFATE 2 MG: 2 INJECTION, SOLUTION INTRAMUSCULAR; INTRAVENOUS at 12:45

## 2022-05-15 RX ADMIN — ALBUTEROL SULFATE 2.5 MG: 2.5 SOLUTION RESPIRATORY (INHALATION) at 19:19

## 2022-05-15 RX ADMIN — ONDANSETRON 4 MG: 2 INJECTION INTRAMUSCULAR; INTRAVENOUS at 22:08

## 2022-05-15 RX ADMIN — INSULIN LISPRO 6 UNITS: 100 INJECTION, SOLUTION INTRAVENOUS; SUBCUTANEOUS at 12:48

## 2022-05-15 RX ADMIN — LEUCINE, PHENYLALANINE, LYSINE, METHIONINE, ISOLEUCINE, VALINE, HISTIDINE, THREONINE, TRYPTOPHAN, ALANINE, GLYCINE, ARGININE, PROLINE, SERINE, TYROSINE, SODIUM ACETATE, DIBASIC POTASSIUM PHOSPHATE, MAGNESIUM CHLORIDE, SODIUM CHLORIDE, CALCIUM CHLORIDE, DEXTROSE
365; 280; 290; 200; 300; 290; 240; 210; 90; 1035; 515; 575; 340; 250; 20; 340; 261; 51; 59; 33; 20 INJECTION INTRAVENOUS at 18:26

## 2022-05-15 RX ADMIN — SODIUM CHLORIDE, PRESERVATIVE FREE 10 ML: 5 INJECTION INTRAVENOUS at 00:52

## 2022-05-15 RX ADMIN — SODIUM CHLORIDE, PRESERVATIVE FREE 10 ML: 5 INJECTION INTRAVENOUS at 05:39

## 2022-05-15 RX ADMIN — SODIUM CHLORIDE, PRESERVATIVE FREE 10 ML: 5 INJECTION INTRAVENOUS at 05:45

## 2022-05-15 RX ADMIN — INSULIN GLARGINE 15 UNITS: 100 INJECTION, SOLUTION SUBCUTANEOUS at 12:49

## 2022-05-15 RX ADMIN — INSULIN LISPRO 6 UNITS: 100 INJECTION, SOLUTION INTRAVENOUS; SUBCUTANEOUS at 00:45

## 2022-05-15 RX ADMIN — DIPHENHYDRAMINE HYDROCHLORIDE 25 MG: 50 INJECTION, SOLUTION INTRAMUSCULAR; INTRAVENOUS at 12:48

## 2022-05-15 RX ADMIN — SODIUM CHLORIDE, PRESERVATIVE FREE 10 ML: 5 INJECTION INTRAVENOUS at 01:50

## 2022-05-15 RX ADMIN — SODIUM CHLORIDE, PRESERVATIVE FREE 10 ML: 5 INJECTION INTRAVENOUS at 05:44

## 2022-05-15 RX ADMIN — HEPARIN SODIUM 5000 UNITS: 5000 INJECTION INTRAVENOUS; SUBCUTANEOUS at 20:39

## 2022-05-15 RX ADMIN — INSULIN LISPRO 9 UNITS: 100 INJECTION, SOLUTION INTRAVENOUS; SUBCUTANEOUS at 16:50

## 2022-05-15 RX ADMIN — Medication 10 ML: at 20:40

## 2022-05-15 RX ADMIN — QUETIAPINE FUMARATE 12.5 MG: 25 TABLET ORAL at 20:38

## 2022-05-15 RX ADMIN — MORPHINE SULFATE 4 MG: 4 INJECTION INTRAVENOUS at 01:49

## 2022-05-15 ASSESSMENT — PAIN SCALES - GENERAL
PAINLEVEL_OUTOF10: 10
PAINLEVEL_OUTOF10: 0
PAINLEVEL_OUTOF10: 4
PAINLEVEL_OUTOF10: 10
PAINLEVEL_OUTOF10: 8
PAINLEVEL_OUTOF10: 0

## 2022-05-15 ASSESSMENT — PAIN DESCRIPTION - LOCATION
LOCATION: ABDOMEN
LOCATION: ABDOMEN
LOCATION: FOOT;LEG

## 2022-05-15 ASSESSMENT — PAIN DESCRIPTION - ORIENTATION: ORIENTATION: RIGHT;LEFT

## 2022-05-15 NOTE — PROGRESS NOTES
[address]  Patient has size 6 XLT or other. Trach Kit of same size on standby  Yes, Obturator at head of the bed  No. Inner cannula cleaned/replaced Yes, drain sponge changed  Yes, Trach tie replaced is soiled No, Flange cleaned  Yes. [address] performed without complications.

## 2022-05-15 NOTE — PROGRESS NOTES
[facility]        Progress Note    Pt Name: Jose Watson  MRN: [id number]  [place]: 1950  Date of evaluation: 5/15/2022    Subjective:    No acute events overnight, awake and talking  Remains off ventilator  Pain controlled, reports some pain along right side  No nausea or vomiting, NGT in place with high output  Reports flatus but no documented stool  Resting in bed at this time, seen in dialysis    Postoperative Day #5      Vital Signs:  Patient Vitals for the past 24 hrs:   BP Temp Temp src Pulse Resp SpO2 Weight   05/15/22 1023 (!) 108/39 97.8 °F (36.6 °C) -- 81 18 -- 285 lb 11.5 oz (129.6 kg)   05/15/22 0723 (!) 115/45 98.6 °F (37 °C) -- 94 18 -- 289 lb (131.1 kg)   05/15/22 0415 (!) 120/52 97.9 °F (36.6 °C) Axillary 96 20 100 % --   05/15/22 0405 -- -- -- 95 -- -- --   05/15/22 0348 -- -- -- -- 16 99 % --   22 2355 108/64 97.6 °F (36.4 °C) Axillary 86 20 100 % --   22 2333 -- -- -- -- 18 100 % --   22 -- -- -- 90 -- -- --   22 -- -- -- -- 18 100 % --   22 1925 (!) 114/49 97.9 °F (36.6 °C) Axillary 78 18 100 % --   22 1612 -- -- -- -- 18 100 % --   22 1607 -- -- -- -- 18 -- --   22 1515 133/68 97.9 °F (36.6 °C) Axillary 94 18 100 % --   22 1254 (!) 142/81 98.4 °F (36.9 °C) Axillary 94 -- 100 % --      TEMPERATURE HISTORY 24H: Temp (24hrs), Av °F (36.7 °C), Min:97.6 °F (36.4 °C), Max:98.6 °F (37 °C)    BLOOD PRESSURE HISTORY: Systolic (18KWY), DLJ:175 , Min:108 , JYB:543    Diastolic (80ONY), ACK:28, Min:39, Max:81      Intake/Output:    I/O last 3 completed shifts:   In: 3227.3 [P.O.:268.7; I.V.:101.5]  Out: 2750 [Emesis/NG output:2750]  I/O this shift:  In: -   Out: 500 [Emesis/NG output:500]  Drain/tube Output:         Physical Exam:  General: awake, alert, trach collar, interacts appropriately  Pulmonary: unlabored respirations  Abdomen: soft, mild distension, appropriate incisional tenderness only, bowel sounds present  Skin/Wound: PREM dressing in place, seal intact, pump functioning    Labs:  CBC:    Recent Labs     05/13/22  0530 05/13/22  0530 05/13/22  1830 05/14/22  0700 05/15/22  0543   WBC 7.8  --   --  7.5 10.7   HGB 6.9*   < > 7.8* 7.6* 7.8*   HCT 23.1*   < > 24.4* 24.6* 25.2*     --   --  272 300    < > = values in this interval not displayed. BMP:    Recent Labs     05/13/22  0530 05/14/22  0700 05/15/22  0543    137 137   K 3.5 4.2 4.3   CL 98* 97* 96*   CO2 25 25 23   BUN 57* 76* 98*   CREATININE 6.0* 6.5* 7.4*   GLUCOSE 206* 236* 253*     Hepatic:   No results for input(s): AST, ALT, ALB, BILITOT, ALKPHOS in the last 72 hours. Amylase: No results for input(s): AMYLASE in the last 72 hours. Lipase: No results for input(s): LIPASE in the last 72 hours. Mag:      Recent Labs     05/13/22  0530 05/14/22  0700 05/15/22  0543   MG 1.80 1.90 2.20      Phos:     Recent Labs     05/13/22  0530 05/14/22  0700 05/15/22  0543   PHOS 3.7 4.8 5.2*      Coags: No results for input(s): INR, APTT in the last 72 hours. Cultures:  Relevant cultures documented under results section     Pathology:  No relevant pathology    Imaging:  I have personally reviewed the following films:    No results found.     Scheduled Meds:   insulin glargine  15 Units SubCUTAneous BID    albuterol  2.5 mg Nebulization BID    bisacodyl  10 mg Rectal Daily    insulin lispro  0-18 Units SubCUTAneous Q4H    [START ON 5/19/2022] fat emulsion  250 mL IntraVENous Once per day on Mon Thu    epoetin claire-epbx  10,000 Units IntraVENous Once per day on Tue Thu Sat    metoprolol tartrate  25 mg Oral BID    QUEtiapine  12.5 mg Oral Nightly    pantoprazole  20 mg IntraVENous Daily    sodium chloride flush  5-40 mL IntraVENous 2 times per day    sodium chloride flush  5-40 mL IntraVENous 2 times per day    heparin (porcine)  5,000 Units SubCUTAneous TID    heparin (porcine)  1,000 Units IntraVENous Once     Continuous Infusions:   PN-Adult Premix 5/20 - Standard Electrolytes - Central Line      PN-Adult Premix 5/20 - Standard Electrolytes - Central Line 83 mL/hr at 05/15/22 0540    sodium chloride      sodium chloride      sodium chloride Stopped (05/13/22 1453)    sodium chloride Stopped (05/12/22 1746)    dextrose       PRN Meds:.diphenhydrAMINE-zinc acetate, sodium chloride, albuterol, sodium chloride, morphine **OR** morphine, metoprolol, LORazepam, sodium chloride, ondansetron **OR** ondansetron, acetaminophen **OR** acetaminophen, sodium chloride flush, sodium chloride, polyethylene glycol, glucose, glucagon (rDNA), dextrose, dextrose bolus **OR** dextrose bolus, diphenhydrAMINE      Assessment:  OR Date 5/10/2022, exploratory laparotomy with lysis of adhesions for small bowel obstruction  Fecal impaction  Chronic respiratory failure on trach/vent  Hypertension  Diabetes  End-stage renal disease on hemodialysis  Paroxysmal atrial fibrillation  History of DVT, on Coumadin  Vaginal bleeding  Morbid obesity, BMI 44.3      Plan:  1. Abdominal exam appropriate, passing flatus but no stool, NGT output remains high, PREM dressing intact--keep in place; continued supportive care, check AXR  2. NPO with NGT decompression--tube pulled back several centimeters; monitor bowel function--continue Dulcolax suppository daily  3. IV hydration and electrolyte correction per nephrology  4. Activity as tolerated  5. PRN analgesics and antiemetics--minimizing narcotics as tolerated  6. DVT prophylaxis with heparin injections; hold oral anticoagulation  7. Management of medical comorbid etiologies per primary team and consulting services    EDUCATION:  Educated patient on plan of care and disease process--all questions answered. Plans discussed with patient and nursing. Reviewed and discussed with Dr. Doris Persaud.       Signed:  ITALO Lemus - CNP  5/15/2022 12:05 PM

## 2022-05-15 NOTE — FLOWSHEET NOTE
HD RN Karalee Pelton called RN in AM, updated, and here to take pt to HD with PCA assist. HD RN d/w charge RN & RT re: oxygenation administration for transport to HD and for while pt in HD.

## 2022-05-15 NOTE — PROGRESS NOTES
Nephrology Progress Note  [facility]  [phone]   [email or web address]    Patient:  Indira Love   : 1950    Reason for Consultation : ESRD on HD     Brief HPI   Mrs. [facility] [place] is a 79 yo woman with h/o ESRD on HD TTS, chronic resp failure s/p trach, h/o CVA, morbid obesity, DM II, Pulm HTN, Afib on AC was admitted for abdominal pain, nausea and emesis.    She was sent from [facility] for sob and the above symptoms  Imaging on admission showed   partial small bowel obstruction, with a discrete transition   point noted adjacent to hernia mesh within the anterior lower abdomen     Subjective/Interval history  Pt seen and examined  NGT remains  BPs controlled  Has been afebrile  Had dialysis earlier today    Meds:  Scheduled Meds:   insulin glargine  15 Units SubCUTAneous BID    albuterol  2.5 mg Nebulization BID    bisacodyl  10 mg Rectal Daily    insulin lispro  0-18 Units SubCUTAneous Q4H    [START ON 2022] fat emulsion  250 mL IntraVENous Once per day on     epoetin claire-epbx  10,000 Units IntraVENous Once per day on  Sat    metoprolol tartrate  25 mg Oral BID    QUEtiapine  12.5 mg Oral Nightly    pantoprazole  20 mg IntraVENous Daily    sodium chloride flush  5-40 mL IntraVENous 2 times per day    sodium chloride flush  5-40 mL IntraVENous 2 times per day    heparin (porcine)  5,000 Units SubCUTAneous TID    heparin (porcine)  1,000 Units IntraVENous Once     Continuous Infusions:   PN-Adult Premix 5/20 - Standard Electrolytes - Central Line      PN-Adult Premix 5/20 - Standard Electrolytes - Central Line 83 mL/hr at 05/15/22 1541    sodium chloride      sodium chloride      sodium chloride Stopped (22 1453)    sodium chloride Stopped (22 1746)    dextrose       PRN Meds:.diphenhydrAMINE-zinc acetate, sodium chloride, albuterol, sodium chloride, morphine **OR** morphine, metoprolol, LORazepam, sodium chloride, ondansetron **OR** ondansetron, acetaminophen **OR** acetaminophen, sodium chloride flush, sodium chloride, polyethylene glycol, glucose, glucagon (rDNA), dextrose, dextrose bolus **OR** dextrose bolus, diphenhydrAMINE    Vitals:  /76   Pulse 99   Temp 97.9 °F (36.6 °C) (Axillary)   Resp 16   Ht 5' 4\" (1.626 m)   Wt 285 lb 11.5 oz (129.6 kg)   SpO2 98%   BMI 49.04 kg/m²     Physical Exam:  Gen: on TC, not in resp distress, resting in bed  HEENT: PERRL, no palor, NGT+  CV: irregularly irregular, S1 S2+  Lungs: decreased breath sounds at the bases  Abd: abdominal binder+  Ext: Trace bilateral LE edema, no cyanosis  Access: Left upper arm AV graft +bruit. Labs:  CBC:   Lab Results   Component Value Date    WBC 10.7 05/15/2022    RBC 2.67 05/15/2022    HGB 7.8 05/15/2022    HCT 25.2 05/15/2022    MCV 94.3 05/15/2022    MCH 29.1 05/15/2022    MCHC 30.8 05/15/2022    RDW 19.5 05/15/2022     05/15/2022    MPV 7.8 05/15/2022     BMP:    Lab Results   Component Value Date     05/15/2022    K 4.3 05/15/2022    K 4.7 05/06/2022    CL 96 05/15/2022    CO2 23 05/15/2022    BUN 98 05/15/2022    LABALBU 3.3 05/12/2022    CREATININE 7.4 05/15/2022    CALCIUM 9.4 05/15/2022    GFRAA 7 05/15/2022    LABGLOM 5 05/15/2022    GLUCOSE 253 05/15/2022       Assessment/Plan:    ESRD   on HD TTS at [facility]  -last outpatient HD on 5/3  -missed 5/5  -Got HD 5/6 x 2 hours  - HD back to Tues/Thurs/Sat schedule.   She cut her treatment off after 50 minutes Saturday and declines dialysis today  Next HD on Tuesday unless otherwise indicated    Hyperkalemia mild, improved     Small bowel obstruction high grade  S/p ex-lap, VIC, hernia reduced 5/10     Vaginal bleeding  Possibly from fibroid uterus     Anemia in CKD  Hb below goal  Continue ESAs with HD    Chronic resp failure: s/p trach  Was on vent post-op, back on TC    Renal cysts bilateral  Left renal lesion  Needs further imaging to characterize    FEN  On TPN    Bridgette Peyman Mcdaniels MD  [facility]  Office Number: [email or web address]

## 2022-05-15 NOTE — PROGRESS NOTES
[facility] PROGRESS NOTE    5/15/2022 7:33 AM        Name: Nehemias Otero . Admitted: 5/6/2022  Primary Care Provider: Medina Bowman MD (Tel: [phone])      Subjective: Megan Comer     Pt seen this am while on HD  No current reports of abd pain  Had SSE yesterday did not return much stool    NG output is decreasing today ,  Bowels sounds are noted this am       Was liberated from the vent on POD # 1 ( 5/11/22)      POD # 4 from [address] with VIC for SBO     Reviewed interval ancillary notes    Current Medications  albuterol (PROVENTIL) nebulizer solution 2.5 mg, BID  bisacodyl (DULCOLAX) suppository 10 mg, Daily  diphenhydrAMINE-zinc acetate cream, TID PRN  insulin glargine (LANTUS) injection vial 20 Units, Nightly  PN-Adult Premix 5/20 - Standard Electrolytes - Central Line, Continuous TPN  insulin lispro (HUMALOG) injection vial 0-18 Units, Q4H  0.9 % sodium chloride infusion, PRN  albuterol (PROVENTIL) nebulizer solution 2.5 mg, Q4H PRN  0.9 % sodium chloride infusion, PRN  [START ON 5/19/2022] fat emulsion 20 % infusion 250 mL, Once per day on Mon Thu  epoetin claire-epbx (RETACRIT) injection 10,000 Units, Once per day on Tue Thu Sat  morphine (PF) injection 2 mg, Q2H PRN   Or  morphine injection 4 mg, Q2H PRN  metoprolol tartrate (LOPRESSOR) tablet 25 mg, BID  QUEtiapine (SEROQUEL) tablet 12.5 mg, Nightly  pantoprazole (PROTONIX) injection 20 mg, Daily  metoprolol (LOPRESSOR) injection 5 mg, Q6H PRN  LORazepam (ATIVAN) injection 0.5 mg, Q12H PRN  sodium chloride flush 0.9 % injection 5-40 mL, 2 times per day  0.9 % sodium chloride infusion, PRN  ondansetron (ZOFRAN-ODT) disintegrating tablet 4 mg, Q8H PRN   Or  ondansetron (ZOFRAN) injection 4 mg, Q6H PRN  acetaminophen (TYLENOL) tablet 650 mg, Q6H PRN   Or  acetaminophen (TYLENOL) suppository 650 mg, Q6H PRN  sodium chloride flush 0.9 % injection 5-40 mL, 2 7. 5 10.7   HGB 6.9*   < > 7.8* 7.6* 7.8*     --   --  272 300    < > = values in this interval not displayed. BMP:    Recent Labs     05/13/22  0530 05/14/22  0700 05/15/22  0543    137 137   K 3.5 4.2 4.3   CL 98* 97* 96*   CO2 25 25 23   BUN 57* 76* 98*   CREATININE 6.0* 6.5* 7.4*   GLUCOSE 206* 236* 253*     Hepatic:   No results for input(s): AST, ALT, ALB, BILITOT, ALKPHOS in the last 72 hours. 1. Worsening partial small bowel obstruction. Problem List  Principal Problem:    SBO (small bowel obstruction) ([facility])  Active Problems:    Fecal impaction (HCC)    ESRD on dialysis ([facility] [address]    Chronic respiratory failure requiring continuous mechanical ventilation through tracheostomy ([facility] [address]    History of CVA (cerebrovascular accident)    Anemia, chronic disease    Chronic diastolic heart failure ([facility] [address]    Essential hypertension    GERD (gastroesophageal reflux disease)    Morbid obesity with BMI of 50.0-59.9, adult ([facility])    SILVER (obstructive sleep apnea)  Resolved Problems:    * No resolved hospital problems. *       Assessment & Plan:   1. SBO: POD # 4 from Exp Lap with VIC: GS following, she has active bowel sounds today. Pt is bed bound and has not been able to walk for several years. 2. PAF;  Now in SR,  On BB, rate in the 80 - 90   3. ESRD:  On HD q [facility], Saturday,  Nephrology is following  4. Hx of respiratory failure:  S/p trach > 1 year ago. She has been extubated, currently weaned down to 6 liters   5. T2DM:  Currently controlled with AIC 5.4 continue with humalog correction as needed   6. Hx of DVT: coumadin on hold ,on SC heparin  7. Malnutrition: TPN started 5/11  8. Bilateral renal cysts per CT scan:  Needs dedicated MRI of kidney per renal mass protocol to exclude neoplasm   9. Acute on chronic anemia:  on retacrit , Hgb 7.8, stable,   She has had 2 units post operatively    10.  Anticipate eventual d/c back to [facility] in [place] where she is a permanent resident       Diet: Diet NPO Exceptions are: [employer], Sips of Water with Meds, Sips of Clear Liquids, Popsicles  PN-Adult Premix 5/20 - Standard Electrolytes - Central Line  Code:Full Code  DVT PPX    ITALO Barros - CNP   5/15/2022 7:33 AM

## 2022-05-15 NOTE — PLAN OF CARE
Problem: Cardiovascular - Adult  Goal: Maintains optimal cardiac output and hemodynamic stability  Outcome: Progressing     Vitals:    05/14/22 2355   BP: 108/64   Pulse: 86   Resp: 20   Temp: 97.6 °F (36.4 °C)   SpO2: 100%     Pt awake in bed and yells out at times. VSS. See all flowsheets. Will continue to monitor.

## 2022-05-15 NOTE — FLOWSHEET NOTE
05/15/22 0723 05/15/22 1023   Vital Signs   BP (!) 115/45 (!) 108/39   Temp 98.6 °F (37 °C) 97.8 °F (36.6 °C)   Pulse 94 81   Resp 18 18   Weight 289 lb (131.1 kg) 285 lb 11.5 oz (129.6 kg)   Weight Method Bed scale Bed scale       Treatment time: 3 hours  Net UF: 1500 ml     EDW: 129 kg     Access used: RAVG  Access function: good with  ml/min     Medications or blood products given: Retacrit     Regular outpatient schedule: [facility]     Summary of response to treatment: tx tolerated fair, pt continued to bend arm causing high  alarms, MD notified. Copy of dialysis treatment record placed in chart, to be scanned into EMR.

## 2022-05-16 ENCOUNTER — APPOINTMENT (OUTPATIENT)
Dept: GENERAL RADIOLOGY | Age: 72
DRG: 335 | End: 2022-05-16
Payer: COMMERCIAL

## 2022-05-16 LAB
ANION GAP SERPL CALCULATED.3IONS-SCNC: 14 MMOL/L (ref 3–16)
BUN BLDV-MCNC: 71 MG/DL (ref 7–20)
CALCIUM SERPL-MCNC: 9.2 MG/DL (ref 8.3–10.6)
CHLORIDE BLD-SCNC: 91 MMOL/L (ref 99–110)
CO2: 25 MMOL/L (ref 21–32)
CREAT SERPL-MCNC: 5.4 MG/DL (ref 0.6–1.2)
GFR AFRICAN AMERICAN: 9
GFR NON-AFRICAN AMERICAN: 8
GLUCOSE BLD-MCNC: 239 MG/DL (ref 70–99)
GLUCOSE BLD-MCNC: 247 MG/DL (ref 70–99)
GLUCOSE BLD-MCNC: 248 MG/DL (ref 70–99)
GLUCOSE BLD-MCNC: 264 MG/DL (ref 70–99)
GLUCOSE BLD-MCNC: 287 MG/DL (ref 70–99)
GLUCOSE BLD-MCNC: 293 MG/DL (ref 70–99)
GLUCOSE BLD-MCNC: 302 MG/DL (ref 70–99)
HCT VFR BLD CALC: 24.5 % (ref 36–48)
HEMOGLOBIN: 7.6 G/DL (ref 12–16)
MAGNESIUM: 2.2 MG/DL (ref 1.8–2.4)
MCH RBC QN AUTO: 29.2 PG (ref 26–34)
MCHC RBC AUTO-ENTMCNC: 31.1 G/DL (ref 31–36)
MCV RBC AUTO: 94 FL (ref 80–100)
PDW BLD-RTO: 19.3 % (ref 12.4–15.4)
PERFORMED ON: ABNORMAL
PHOSPHORUS: 4.2 MG/DL (ref 2.5–4.9)
PLATELET # BLD: 246 K/UL (ref 135–450)
PMV BLD AUTO: 8.2 FL (ref 5–10.5)
POTASSIUM SERPL-SCNC: 4.3 MMOL/L (ref 3.5–5.1)
PROCALCITONIN: 3 NG/ML (ref 0–0.15)
RBC # BLD: 2.61 M/UL (ref 4–5.2)
SODIUM BLD-SCNC: 130 MMOL/L (ref 136–145)
WBC # BLD: 11.9 K/UL (ref 4–11)

## 2022-05-16 PROCEDURE — APPSS15 APP SPLIT SHARED TIME 0-15 MINUTES: Performed by: NURSE PRACTITIONER

## 2022-05-16 PROCEDURE — 84145 PROCALCITONIN (PCT): CPT

## 2022-05-16 PROCEDURE — 6360000002 HC RX W HCPCS: Performed by: SURGERY

## 2022-05-16 PROCEDURE — 71045 X-RAY EXAM CHEST 1 VIEW: CPT

## 2022-05-16 PROCEDURE — 94640 AIRWAY INHALATION TREATMENT: CPT

## 2022-05-16 PROCEDURE — 2060000000 HC ICU INTERMEDIATE R&B

## 2022-05-16 PROCEDURE — 6370000000 HC RX 637 (ALT 250 FOR IP): Performed by: NURSE PRACTITIONER

## 2022-05-16 PROCEDURE — 36415 COLL VENOUS BLD VENIPUNCTURE: CPT

## 2022-05-16 PROCEDURE — APPNB30 APP NON BILLABLE TIME 0-30 MINS: Performed by: NURSE PRACTITIONER

## 2022-05-16 PROCEDURE — 83735 ASSAY OF MAGNESIUM: CPT

## 2022-05-16 PROCEDURE — 6370000000 HC RX 637 (ALT 250 FOR IP): Performed by: SURGERY

## 2022-05-16 PROCEDURE — 94760 N-INVAS EAR/PLS OXIMETRY 1: CPT

## 2022-05-16 PROCEDURE — C9113 INJ PANTOPRAZOLE SODIUM, VIA: HCPCS | Performed by: SURGERY

## 2022-05-16 PROCEDURE — 2580000003 HC RX 258: Performed by: SURGERY

## 2022-05-16 PROCEDURE — 84100 ASSAY OF PHOSPHORUS: CPT

## 2022-05-16 PROCEDURE — 80048 BASIC METABOLIC PNL TOTAL CA: CPT

## 2022-05-16 PROCEDURE — 85027 COMPLETE CBC AUTOMATED: CPT

## 2022-05-16 PROCEDURE — 2500000003 HC RX 250 WO HCPCS: Performed by: INTERNAL MEDICINE

## 2022-05-16 PROCEDURE — 99024 POSTOP FOLLOW-UP VISIT: CPT | Performed by: SURGERY

## 2022-05-16 PROCEDURE — 94761 N-INVAS EAR/PLS OXIMETRY MLT: CPT

## 2022-05-16 PROCEDURE — 6360000002 HC RX W HCPCS: Performed by: NURSE PRACTITIONER

## 2022-05-16 RX ORDER — INSULIN GLARGINE 100 [IU]/ML
20 INJECTION, SOLUTION SUBCUTANEOUS 2 TIMES DAILY
Status: DISCONTINUED | OUTPATIENT
Start: 2022-05-16 | End: 2022-05-17

## 2022-05-16 RX ADMIN — INSULIN LISPRO 9 UNITS: 100 INJECTION, SOLUTION INTRAVENOUS; SUBCUTANEOUS at 12:45

## 2022-05-16 RX ADMIN — ALBUTEROL SULFATE 2.5 MG: 2.5 SOLUTION RESPIRATORY (INHALATION) at 19:44

## 2022-05-16 RX ADMIN — Medication 10 ML: at 20:23

## 2022-05-16 RX ADMIN — QUETIAPINE FUMARATE 12.5 MG: 25 TABLET ORAL at 20:22

## 2022-05-16 RX ADMIN — LORAZEPAM 0.5 MG: 2 INJECTION INTRAMUSCULAR; INTRAVENOUS at 15:30

## 2022-05-16 RX ADMIN — INSULIN LISPRO 9 UNITS: 100 INJECTION, SOLUTION INTRAVENOUS; SUBCUTANEOUS at 08:59

## 2022-05-16 RX ADMIN — INSULIN LISPRO 6 UNITS: 100 INJECTION, SOLUTION INTRAVENOUS; SUBCUTANEOUS at 17:11

## 2022-05-16 RX ADMIN — Medication 10 ML: at 09:21

## 2022-05-16 RX ADMIN — INSULIN GLARGINE 20 UNITS: 100 INJECTION, SOLUTION SUBCUTANEOUS at 08:59

## 2022-05-16 RX ADMIN — Medication 10 MG: at 09:18

## 2022-05-16 RX ADMIN — DIPHENHYDRAMINE HYDROCHLORIDE 25 MG: 50 INJECTION, SOLUTION INTRAMUSCULAR; INTRAVENOUS at 11:12

## 2022-05-16 RX ADMIN — MORPHINE SULFATE 2 MG: 2 INJECTION, SOLUTION INTRAMUSCULAR; INTRAVENOUS at 11:12

## 2022-05-16 RX ADMIN — MORPHINE SULFATE 4 MG: 4 INJECTION INTRAVENOUS at 20:22

## 2022-05-16 RX ADMIN — HEPARIN SODIUM 5000 UNITS: 5000 INJECTION INTRAVENOUS; SUBCUTANEOUS at 09:21

## 2022-05-16 RX ADMIN — INSULIN LISPRO 9 UNITS: 100 INJECTION, SOLUTION INTRAVENOUS; SUBCUTANEOUS at 05:21

## 2022-05-16 RX ADMIN — HEPARIN SODIUM 5000 UNITS: 5000 INJECTION INTRAVENOUS; SUBCUTANEOUS at 20:22

## 2022-05-16 RX ADMIN — ASCORBIC ACID, VITAMIN A PALMITATE, CHOLECALCIFEROL, THIAMINE HYDROCHLORIDE, RIBOFLAVIN-5 PHOSPHATE SODIUM, PYRIDOXINE HYDROCHLORIDE, NIACINAMIDE, DEXPANTHENOL, ALPHA-TOCOPHEROL ACETATE, VITAMIN K1, FOLIC ACID, BIOTIN, CYANOCOBALAMIN: 200; 3300; 200; 6; 3.6; 6; 40; 15; 10; 150; 600; 60; 5 INJECTION, SOLUTION INTRAVENOUS at 18:37

## 2022-05-16 RX ADMIN — ALBUTEROL SULFATE 2.5 MG: 2.5 SOLUTION RESPIRATORY (INHALATION) at 11:07

## 2022-05-16 RX ADMIN — HEPARIN SODIUM 5000 UNITS: 5000 INJECTION INTRAVENOUS; SUBCUTANEOUS at 15:36

## 2022-05-16 RX ADMIN — PANTOPRAZOLE SODIUM 20 MG: 40 INJECTION, POWDER, FOR SOLUTION INTRAVENOUS at 09:18

## 2022-05-16 RX ADMIN — INSULIN LISPRO 6 UNITS: 100 INJECTION, SOLUTION INTRAVENOUS; SUBCUTANEOUS at 20:32

## 2022-05-16 RX ADMIN — INSULIN LISPRO 6 UNITS: 100 INJECTION, SOLUTION INTRAVENOUS; SUBCUTANEOUS at 00:33

## 2022-05-16 RX ADMIN — INSULIN GLARGINE 20 UNITS: 100 INJECTION, SOLUTION SUBCUTANEOUS at 20:32

## 2022-05-16 ASSESSMENT — PAIN SCALES - GENERAL
PAINLEVEL_OUTOF10: 6
PAINLEVEL_OUTOF10: 8
PAINLEVEL_OUTOF10: 0
PAINLEVEL_OUTOF10: 4

## 2022-05-16 ASSESSMENT — PAIN DESCRIPTION - ORIENTATION: ORIENTATION: MID

## 2022-05-16 ASSESSMENT — PAIN SCALES - WONG BAKER: WONGBAKER_NUMERICALRESPONSE: 0

## 2022-05-16 ASSESSMENT — PAIN DESCRIPTION - LOCATION: LOCATION: ABDOMEN

## 2022-05-16 ASSESSMENT — PAIN DESCRIPTION - PAIN TYPE: TYPE: CHRONIC PAIN

## 2022-05-16 NOTE — PROGRESS NOTES
[facility] [facility]        Progress Note    Pt Name: Robbin Garcia  MRN: [id number]  [place]: 1950  Date of evaluation: 2022    Subjective:    No acute events overnight  Incisional pain controlled, pain along right side better--bedside nurse reports switching out abdominal binders, previous caused a skin-tear along right side abdomen  No nausea or vomiting, NGT in place with decreasing output  Reports flatus and stool yesterday  Resting in bed at this time    Postoperative Day #6      Vital Signs:  Patient Vitals for the past 24 hrs:   BP Temp Temp src Pulse Resp SpO2 Weight   22 1300 116/74 98.7 °F (37.1 °C) Oral 99 18 99 % --   22 1155 105/71 98 °F (36.7 °C) Oral 91 16 99 % --   22 1142 -- -- -- -- 18 -- --   22 0808 (!) 90/56 98.2 °F (36.8 °C) Oral 89 16 100 % 286 lb 9.6 oz (130 kg)   22 0726 94/60 98.1 °F (36.7 °C) Oral 92 18 95 % --   22 0450 110/74 98.2 °F (36.8 °C) Oral 88 18 100 % --   05/15/22 2316 -- -- -- -- 18 100 % --   05/15/22 2313 110/70 98.5 °F (36.9 °C) Oral 82 18 100 % --   05/15/22 1931 123/79 97.5 °F (36.4 °C) Axillary 84 16 100 % --   05/15/22 1921 -- -- -- -- 16 100 % --   05/15/22 1625 -- -- -- -- 16 98 % --   05/15/22 1530 139/76 97.9 °F (36.6 °C) Axillary 99 16 100 % --      TEMPERATURE HISTORY 24H: Temp (24hrs), Av.1 °F (36.7 °C), Min:97.5 °F (36.4 °C), Max:98.7 °F (37.1 °C)    BLOOD PRESSURE HISTORY: Systolic (36CLO), FDP:547 , Min:90 , AJN:639    Diastolic (19GRS), LEH:06, Min:39, Max:79      Intake/Output:    I/O last 3 completed shifts: In: 2232.1 [P.O.:180; NG/GT:60]  Out: 2450 [Emesis/NG output:2450]  No intake/output data recorded.   Drain/tube Output:         Physical Exam:  General: awake, alert, trach collar, interacts appropriately  Pulmonary: unlabored respirations  Abdomen: soft, mild distension verses obesity, appropriate incisional tenderness only, bowel sounds present  Skin/Wound: PREM dressing in place, seal intact, pump functioning    Labs:  CBC:    Recent Labs     05/14/22  0700 05/15/22  0543 05/16/22  0530   WBC 7.5 10.7 11.9*   HGB 7.6* 7.8* 7.6*   HCT 24.6* 25.2* 24.5*    300 246     BMP:    Recent Labs     05/14/22  0700 05/15/22  0543 05/16/22  0530    137 130*   K 4.2 4.3 4.3   CL 97* 96* 91*   CO2 25 23 25   BUN 76* 98* 71*   CREATININE 6.5* 7.4* 5.4*   GLUCOSE 236* 253* 302*     Hepatic:   No results for input(s): AST, ALT, ALB, BILITOT, ALKPHOS in the last 72 hours. Amylase: No results for input(s): AMYLASE in the last 72 hours. Lipase: No results for input(s): LIPASE in the last 72 hours. Mag:      Recent Labs     05/14/22  0700 05/15/22  0543 05/16/22  0530   MG 1.90 2.20 2.20      Phos:     Recent Labs     05/14/22  0700 05/15/22  0543 05/16/22  0530   PHOS 4.8 5.2* 4.2      Coags: No results for input(s): INR, APTT in the last 72 hours. Cultures:  Relevant cultures documented under results section     Pathology:  No relevant pathology    Imaging:  I have personally reviewed the following films:    XR CHEST PORTABLE    Result Date: 5/16/2022  EXAMINATION: ONE XRAY VIEW OF THE CHEST 5/16/2022 3:00 pm COMPARISON: May 10, 2022 HISTORY: ORDERING SYSTEM PROVIDED HISTORY: post op TECHNOLOGIST PROVIDED HISTORY: Reason for exam:->post op Reason for Exam: Post op FINDINGS: The cardiac silhouette is enlarged. No pleural effusion or pneumothorax. Tracheostomy tube projecting near the mid clavicular heads. Left central venous catheter projects over the SVC. There is a bend in the proximal aspect of the catheter which could be external to the patient. Mild interstitial prominence favoring mild pulmonary vascular congestion. Enteric tube projects below the left hemidiaphragm likely in the proximal to mid stomach. Mild increased pulmonary vascular congestion.      XR ABDOMEN FOR NG/OG/NE TUBE PLACEMENT    Result Date: 5/15/2022  EXAM: XR Abdomen, 1 View EXAM DATE/TIME: 5/15/2022 12:59 pm CLINICAL HISTORY: ORDERING SYSTEM PROVIDED  Eval bowel gas pattern and NGT placement--PORTABLE TECHNOLOGIST PROVIDED HISTORY:  Reason for exam:->Eval bowel gas pattern and NGT placement--PORTABLE portable? ->Yes Reason for Exam: ng placement TECHNIQUE: Frontal supine view of the abdomen/pelvis. COMPARISON: 05/09/2022 FINDINGS: Gastrointestinal tract:  Only the upper abdomen is included on this study but what can be seen of the bowel gas pattern suggest persistent small bowel dilation suggesting a degree of obstruction though possibly decreased in caliber from the prior study. Bones/joints:  No acute findings. Tubes, lines and devices:  NG tube extends into the body of the stomach. Tracheostomy tube in place. Left internal jugular central venous line with the tip in the upper SVC. 1.  NG tube extends into the body of the stomach. 2.  Only the upper abdomen is included on this study but what can be seen of the bowel gas pattern suggest persistent small bowel dilation suggesting a degree of obstruction though possibly decreased in caliber from the prior study.      Scheduled Meds:   insulin glargine  20 Units SubCUTAneous BID    albuterol  2.5 mg Nebulization BID    bisacodyl  10 mg Rectal Daily    insulin lispro  0-18 Units SubCUTAneous Q4H    [START ON 5/19/2022] fat emulsion  250 mL IntraVENous Once per day on Mon Thu    epoetin claire-epbx  10,000 Units IntraVENous Once per day on Tue Thu Sat    metoprolol tartrate  25 mg Oral BID    QUEtiapine  12.5 mg Oral Nightly    pantoprazole  20 mg IntraVENous Daily    sodium chloride flush  5-40 mL IntraVENous 2 times per day    sodium chloride flush  5-40 mL IntraVENous 2 times per day    heparin (porcine)  5,000 Units SubCUTAneous TID    heparin (porcine)  1,000 Units IntraVENous Once     Continuous Infusions:   PN-Adult Premix 5/20 - Standard Electrolytes - Central Line      PN-Adult Premix 5/20 - Standard Electrolytes - Central Line 83 mL/hr at 05/15/22 1826    sodium chloride      sodium chloride      sodium chloride Stopped (05/13/22 1453)    sodium chloride Stopped (05/12/22 1746)    dextrose       PRN Meds:.diphenhydrAMINE-zinc acetate, sodium chloride, albuterol, sodium chloride, morphine **OR** morphine, metoprolol, LORazepam, sodium chloride, ondansetron **OR** ondansetron, acetaminophen **OR** acetaminophen, sodium chloride flush, sodium chloride, polyethylene glycol, glucose, glucagon (rDNA), dextrose, dextrose bolus **OR** dextrose bolus, diphenhydrAMINE      Assessment:  OR Date 5/10/2022, exploratory laparotomy with lysis of adhesions for small bowel obstruction  Fecal impaction  Chronic respiratory failure on trach/vent  Hypertension  Diabetes  End-stage renal disease on hemodialysis  Paroxysmal atrial fibrillation  History of DVT, on Coumadin  Vaginal bleeding  Morbid obesity, BMI 44.3  Postoperative ileus      Plan:  1. Abdominal exam appropriate, passing flatus and stool, NGT output high but decreasing, PREM dressing intact--keep in place; continued supportive care  2. NPO with NGT decompression--will hopefully be able to remove soon; monitor bowel function--continue Dulcolax suppository daily  3. IV hydration and electrolyte correction per nephrology  4. Activity as tolerated  5. PRN analgesics and antiemetics--minimizing narcotics as tolerated  6. DVT prophylaxis with heparin injections; hold oral anticoagulation  7. Management of medical comorbid etiologies per primary team and consulting services  8. Disposition: Discharge planning, possible LTAC prior to returning to [facility]    EDUCATION:  Educated patient on plan of care and disease process--all questions answered. Plans discussed with patient and nursing. Reviewed and discussed with Dr. Pari Mckeon.       Signed:  ITALO Munoz - CNP  5/16/2022 3:27 PM     Surgery Staff:     Pt seen and examined with NP  See full note above  BM are beginning, NG remains moderate in output  Likely remove in am  Look at [facility] placement tomorrow or thereafter    Edgar Vega MD

## 2022-05-16 NOTE — PROGRESS NOTES
PRN  sodium chloride flush 0.9 % injection 5-40 mL, 2 times per day  sodium chloride flush 0.9 % injection 5-40 mL, PRN  0.9 % sodium chloride infusion, PRN  heparin (porcine) injection 5,000 Units, TID  polyethylene glycol (GLYCOLAX) packet 17 g, Daily PRN  glucose (GLUTOSE) 40 % oral gel 15 g, PRN  glucagon (rDNA) injection 1 mg, PRN  dextrose 5 % solution, PRN  dextrose bolus 10% 125 mL, PRN   Or  dextrose bolus 10% 250 mL, PRN  heparin (porcine) injection 1,000 Units, Once  diphenhydrAMINE (BENADRYL) injection 25 mg, Q6H PRN        Objective:  BP 94/60   Pulse 92   Temp 98.1 °F (36.7 °C) (Oral)   Resp 18   Ht 5' 4\" (1.626 m)   Wt 285 lb 11.5 oz (129.6 kg)   SpO2 95%   BMI 49.04 kg/m²     Intake/Output Summary (Last 24 hours) at 5/16/2022 0734  Last data filed at 5/16/2022 0726  Gross per 24 hour   Intake 1273.06 ml   Output 1400 ml   Net -126.94 ml      Wt Readings from Last 3 Encounters:   05/15/22 285 lb 11.5 oz (129.6 kg)   05/01/22 (!) 309 lb 8 oz (140.4 kg)   04/26/22 291 lb (132 kg)       General appearance:  Appears chronically ill and obese. Awake, she is alert and oriented but wants to rest, morbidly obese  Eyes: Sclera clear. Pupils equal.  ENT: Moist oral mucosa. Trachea midline, no adenopathy. Cardiovascular: Regular rhythm, normal S1, S2. No murmur. No edema in lower extremities  Respiratory: Not using accessory muscles. trach collar in place with 8 liters of oxygen  , no ronchi or wheezing. Decreased in bases   GI: Abdomen softly distended and obese with rare bowel sounds. NG to suction with green drainage. abd dressing is clean dry intact.   abd binder is in place    Musculoskeletal: No cyanosis in digits, neck supple  Neurology: moving arms and her head, sedation is being weaned   Psych: sedated   Skin: Warm, dry, poor  Turgor, scratch marks notes on chest and arms but appear to be healing     Labs and Tests:  CBC:   Recent Labs     05/14/22  0700 05/15/22  0543 05/16/22  0530   WBC 7.5 10.7 11.9*   HGB 7.6* 7.8* 7.6*    300 246     BMP:    Recent Labs     05/14/22  0700 05/15/22  0543 05/16/22  0530    137 130*   K 4.2 4.3 4.3   CL 97* 96* 91*   CO2 25 23 25   BUN 76* 98* 71*   CREATININE 6.5* 7.4* 5.4*   GLUCOSE 236* 253* 302*     Hepatic:   No results for input(s): AST, ALT, ALB, BILITOT, ALKPHOS in the last 72 hours. Results for Syd Boyd (MRN [id number]) as of 5/16/2022 07:34   Ref. Range 5/15/2022 12:00 5/15/2022 15:59 5/15/2022 19:58 5/16/2022 00:10 5/16/2022 04:46   POC Glucose Latest Ref Range: 70 - 99 mg/dl 231 (H) 260 (H) 235 (H) 248 (H) 264 (H)           Problem List  Principal Problem:    SBO (small bowel obstruction) (HCC)  Active Problems:    Fecal impaction (HCC)    ESRD on dialysis ([facility]    Chronic respiratory failure requiring continuous mechanical ventilation through tracheostomy ([facility]    History of CVA (cerebrovascular accident)    Anemia, chronic disease    Chronic diastolic heart failure ([facility]    Essential hypertension    GERD (gastroesophageal reflux disease)    Morbid obesity with BMI of 50.0-59.9, adult (HCC)    SILVER (obstructive sleep apnea)  Resolved Problems:    * No resolved hospital problems. *       Assessment & Plan:   1. SBO: POD # 5 from Exp Lap with VIC: GS following, she has active bowel sounds today. Pt is bed bound and has not been able to walk for several years. 2. PAF;  Now in SR,  On BB, rate in the 80 - 90   3. ESRD:  On HD monica Garcia, Saturday,  Nephrology is following  4. Hx of respiratory failure:  S/p trach > 1 year ago. She has been extubated, currently on 8 liters oxygen. She refuses to use IS. Will check portable chest xray and add pro calcitonin   5. T2DM:  Current hyperglycemia likely due to TPN given AIC of 5.4  Will increased lantus to 20 units BID   6. Hx of DVT: coumadin on hold ,on SC heparin  7. Malnutrition: TPN started 5/11  8.  Bilateral renal cysts per CT scan:  Needs dedicated MRI of kidney per renal mass protocol to exclude neoplasm   9. Acute on chronic anemia:  on retacrit , Hgb 7.6, stable,   She has had 2 units post operatively    10.  Discussed with surgery the possibility of transfer to [place] [id number] with anticipation of  eventual d/c back to [facility] in [place] where she is a permanent resident       Diet: Diet NPO Exceptions are: Ice Chips, Sips of Water with Meds, Sips of Clear Liquids, Popsicles  PN-Adult Premix 5/20 - Standard Electrolytes - Central Line  Code:Full Code  DVT PPX    ITALO Vences - CNP   5/16/2022 7:34 AM

## 2022-05-16 NOTE — PROGRESS NOTES
[facility]   Nephrology progress Note  [phone]   [facility]. [place]           SUMMARY :  We are following this patient for ESRD on maintenance hemodialysis   78-year-old female with past medical history of ESRD on maintenance hemodialysis, CVA, morbid obesity, type 2 diabetes, pulmonary hypertension, atrial fibrillation, was sent to from the [address] [facility] home with shortness of breath    SUBJECTIVE:   Patient progress reviewed. The patient says she is in pain     Physical Exam:    VITALS:  BP (!) 90/56   Pulse 89   Temp 98.2 °F (36.8 °C) (Oral)   Resp 16   Ht 5' 4\" (1.626 m)   Wt 286 lb 9.6 oz (130 kg)   SpO2 100%   BMI 49.19 kg/m²   BLOOD PRESSURE RANGE:  Systolic (15JJQ), SJP:973 , Min:90 , UNF:617   ; Diastolic (43AHI), TSK:08, Min:39, Max:79    24HR INTAKE/OUTPUT:    Intake/Output Summary (Last 24 hours) at 5/16/2022 1008  Last data filed at 5/16/2022 0659  Gross per 24 hour   Intake 1273.06 ml   Output 1400 ml   Net -126.94 ml       Gen:  alert, oriented x 3, Obese  PERRL , EOM +  Pallor +, No icterus  Tracheostomy +  JVP not raised   CV: RRR no murmur or rub . Lungs:B/ L air entry, Normal breath sounds   Abd: soft, bowel sounds + , No organomegaly   Ext: No edema, no cyanosis  Skin: Warm.   No rash  Neuro: nonfocal.  Left upper arm AVG       DATA:    CBC with Differential:    Lab Results   Component Value Date    WBC 11.9 05/16/2022    RBC 2.61 05/16/2022    HGB 7.6 05/16/2022    HCT 24.5 05/16/2022     05/16/2022    MCV 94.0 05/16/2022    MCH 29.2 05/16/2022    MCHC 31.1 05/16/2022    RDW 19.3 05/16/2022    BANDSPCT 2 05/01/2022    LYMPHOPCT 7.1 05/06/2022    MONOPCT 3.5 05/06/2022    MYELOPCT 1 04/30/2022    BASOPCT 0.9 05/06/2022    MONOSABS 0.4 05/06/2022    LYMPHSABS 0.9 05/06/2022    EOSABS 0.2 05/06/2022    BASOSABS 0.1 05/06/2022     CMP:    Lab Results   Component Value Date     05/16/2022    K 4.3 05/16/2022    K 4.7 05/06/2022    CL 91 05/16/2022    CO2 25 05/16/2022    BUN 71 05/16/2022    CREATININE 5.4 05/16/2022    GFRAA 9 05/16/2022    AGRATIO 1.0 05/12/2022    LABGLOM 8 05/16/2022    GLUCOSE 302 05/16/2022    PROT 6.7 05/12/2022    LABALBU 3.3 05/12/2022    CALCIUM 9.2 05/16/2022    BILITOT 0.6 05/12/2022    ALKPHOS 68 05/12/2022    AST <5 05/12/2022    ALT <5 05/12/2022     Magnesium:    Lab Results   Component Value Date    MG 2.20 05/16/2022     Phosphorus:    Lab Results   Component Value Date    PHOS 4.2 05/16/2022     Uric Acid:    Lab Results   Component Value Date    LABURIC 6.3 04/29/2022     Troponin:    Lab Results   Component Value Date    TROPONINI 0.20 05/06/2022     U/A:  No results found for: NITRITE, COLORU, PROTEINU, PHUR, LABCAST, WBCUA, RBCUA, MUCUS, TRICHOMONAS, YEAST, BACTERIA, CLARITYU, SPECGRAV, LEUKOCYTESUR, UROBILINOGEN, BILIRUBINUR, BLOODU, GLUCOSEU, AMORPHOUS      IMPRESSION/RECOMMENDATIONS:      Diagnosis and Plan     1. ESRD on HD  TTS at [facility]     2. Anemia of CKD/ vaginal Bleeding : target Hb 9-11    3. Renal osteodystrophy : Monitor Ca and phos   4.  Left Renal mass ( CT abdomen and pelvis  5/6/22)   Numerous cysts are identified within the   kidneys bilaterally.  Many of the cysts are simple fluid in density, however   several are also not consistent with simple cysts. Amy Collar is a 40 Hounsfield   unit lesion in the lower pole the left kidney measuring 3.6 cm in size.       Urology Rosa Frey MD

## 2022-05-17 LAB
ANION GAP SERPL CALCULATED.3IONS-SCNC: 15 MMOL/L (ref 3–16)
BUN BLDV-MCNC: 94 MG/DL (ref 7–20)
CALCIUM SERPL-MCNC: 9.4 MG/DL (ref 8.3–10.6)
CHLORIDE BLD-SCNC: 93 MMOL/L (ref 99–110)
CO2: 23 MMOL/L (ref 21–32)
CREAT SERPL-MCNC: 5.8 MG/DL (ref 0.6–1.2)
GFR AFRICAN AMERICAN: 9
GFR NON-AFRICAN AMERICAN: 7
GLUCOSE BLD-MCNC: 223 MG/DL (ref 70–99)
GLUCOSE BLD-MCNC: 224 MG/DL (ref 70–99)
GLUCOSE BLD-MCNC: 227 MG/DL (ref 70–99)
GLUCOSE BLD-MCNC: 240 MG/DL (ref 70–99)
GLUCOSE BLD-MCNC: 267 MG/DL (ref 70–99)
GLUCOSE BLD-MCNC: 283 MG/DL (ref 70–99)
GLUCOSE BLD-MCNC: 294 MG/DL (ref 70–99)
HCT VFR BLD CALC: 24 % (ref 36–48)
HEMOGLOBIN: 7.5 G/DL (ref 12–16)
MAGNESIUM: 2.2 MG/DL (ref 1.8–2.4)
MCH RBC QN AUTO: 29.8 PG (ref 26–34)
MCHC RBC AUTO-ENTMCNC: 31.3 G/DL (ref 31–36)
MCV RBC AUTO: 95.2 FL (ref 80–100)
PDW BLD-RTO: 19.3 % (ref 12.4–15.4)
PERFORMED ON: ABNORMAL
PLATELET # BLD: 244 K/UL (ref 135–450)
PMV BLD AUTO: 8.5 FL (ref 5–10.5)
POTASSIUM SERPL-SCNC: 4.2 MMOL/L (ref 3.5–5.1)
RBC # BLD: 2.52 M/UL (ref 4–5.2)
SODIUM BLD-SCNC: 131 MMOL/L (ref 136–145)
WBC # BLD: 10.9 K/UL (ref 4–11)

## 2022-05-17 PROCEDURE — APPSS15 APP SPLIT SHARED TIME 0-15 MINUTES: Performed by: NURSE PRACTITIONER

## 2022-05-17 PROCEDURE — 6370000000 HC RX 637 (ALT 250 FOR IP): Performed by: SURGERY

## 2022-05-17 PROCEDURE — 85027 COMPLETE CBC AUTOMATED: CPT

## 2022-05-17 PROCEDURE — 6370000000 HC RX 637 (ALT 250 FOR IP): Performed by: PHYSICIAN ASSISTANT

## 2022-05-17 PROCEDURE — C9113 INJ PANTOPRAZOLE SODIUM, VIA: HCPCS | Performed by: SURGERY

## 2022-05-17 PROCEDURE — 94640 AIRWAY INHALATION TREATMENT: CPT

## 2022-05-17 PROCEDURE — 6360000002 HC RX W HCPCS: Performed by: SURGERY

## 2022-05-17 PROCEDURE — 2060000000 HC ICU INTERMEDIATE R&B

## 2022-05-17 PROCEDURE — APPNB30 APP NON BILLABLE TIME 0-30 MINS: Performed by: NURSE PRACTITIONER

## 2022-05-17 PROCEDURE — 80048 BASIC METABOLIC PNL TOTAL CA: CPT

## 2022-05-17 PROCEDURE — 6360000002 HC RX W HCPCS: Performed by: NURSE PRACTITIONER

## 2022-05-17 PROCEDURE — 83735 ASSAY OF MAGNESIUM: CPT

## 2022-05-17 PROCEDURE — 2500000003 HC RX 250 WO HCPCS: Performed by: INTERNAL MEDICINE

## 2022-05-17 PROCEDURE — 6370000000 HC RX 637 (ALT 250 FOR IP): Performed by: NURSE PRACTITIONER

## 2022-05-17 PROCEDURE — 99024 POSTOP FOLLOW-UP VISIT: CPT | Performed by: SURGERY

## 2022-05-17 PROCEDURE — 90935 HEMODIALYSIS ONE EVALUATION: CPT

## 2022-05-17 PROCEDURE — 2580000003 HC RX 258: Performed by: SURGERY

## 2022-05-17 RX ORDER — INSULIN GLARGINE 100 [IU]/ML
30 INJECTION, SOLUTION SUBCUTANEOUS 2 TIMES DAILY
Status: DISCONTINUED | OUTPATIENT
Start: 2022-05-17 | End: 2022-05-18

## 2022-05-17 RX ORDER — METOCLOPRAMIDE HYDROCHLORIDE 5 MG/ML
5 INJECTION INTRAMUSCULAR; INTRAVENOUS EVERY 6 HOURS
Status: COMPLETED | OUTPATIENT
Start: 2022-05-17 | End: 2022-05-17

## 2022-05-17 RX ORDER — INSULIN GLARGINE 100 [IU]/ML
25 INJECTION, SOLUTION SUBCUTANEOUS 2 TIMES DAILY
Status: DISCONTINUED | OUTPATIENT
Start: 2022-05-17 | End: 2022-05-17

## 2022-05-17 RX ADMIN — METOCLOPRAMIDE 5 MG: 5 INJECTION, SOLUTION INTRAMUSCULAR; INTRAVENOUS at 13:50

## 2022-05-17 RX ADMIN — Medication 10 ML: at 21:06

## 2022-05-17 RX ADMIN — HEPARIN SODIUM 5000 UNITS: 5000 INJECTION INTRAVENOUS; SUBCUTANEOUS at 15:10

## 2022-05-17 RX ADMIN — MORPHINE SULFATE 4 MG: 4 INJECTION INTRAVENOUS at 13:47

## 2022-05-17 RX ADMIN — INSULIN LISPRO 9 UNITS: 100 INJECTION, SOLUTION INTRAVENOUS; SUBCUTANEOUS at 00:39

## 2022-05-17 RX ADMIN — LEUCINE, PHENYLALANINE, LYSINE, METHIONINE, ISOLEUCINE, VALINE, HISTIDINE, THREONINE, TRYPTOPHAN, ALANINE, GLYCINE, ARGININE, PROLINE, SERINE, TYROSINE, SODIUM ACETATE, DIBASIC POTASSIUM PHOSPHATE, MAGNESIUM CHLORIDE, SODIUM CHLORIDE, CALCIUM CHLORIDE, DEXTROSE
365; 280; 290; 200; 300; 290; 240; 210; 90; 1035; 515; 575; 340; 250; 20; 340; 261; 51; 59; 33; 20 INJECTION INTRAVENOUS at 18:35

## 2022-05-17 RX ADMIN — INSULIN GLARGINE 30 UNITS: 100 INJECTION, SOLUTION SUBCUTANEOUS at 21:10

## 2022-05-17 RX ADMIN — DIPHENHYDRAMINE HYDROCHLORIDE 25 MG: 50 INJECTION, SOLUTION INTRAMUSCULAR; INTRAVENOUS at 22:01

## 2022-05-17 RX ADMIN — ALBUTEROL SULFATE 2.5 MG: 2.5 SOLUTION RESPIRATORY (INHALATION) at 19:55

## 2022-05-17 RX ADMIN — HEPARIN SODIUM 5000 UNITS: 5000 INJECTION INTRAVENOUS; SUBCUTANEOUS at 21:05

## 2022-05-17 RX ADMIN — HEPARIN SODIUM 5000 UNITS: 5000 INJECTION INTRAVENOUS; SUBCUTANEOUS at 09:04

## 2022-05-17 RX ADMIN — INSULIN LISPRO 6 UNITS: 100 INJECTION, SOLUTION INTRAVENOUS; SUBCUTANEOUS at 04:00

## 2022-05-17 RX ADMIN — INSULIN LISPRO 6 UNITS: 100 INJECTION, SOLUTION INTRAVENOUS; SUBCUTANEOUS at 13:30

## 2022-05-17 RX ADMIN — INSULIN LISPRO 9 UNITS: 100 INJECTION, SOLUTION INTRAVENOUS; SUBCUTANEOUS at 17:26

## 2022-05-17 RX ADMIN — EPOETIN ALFA-EPBX 10000 UNITS: 10000 INJECTION, SOLUTION INTRAVENOUS; SUBCUTANEOUS at 11:28

## 2022-05-17 RX ADMIN — METOCLOPRAMIDE 5 MG: 5 INJECTION, SOLUTION INTRAMUSCULAR; INTRAVENOUS at 18:36

## 2022-05-17 RX ADMIN — INSULIN LISPRO 6 UNITS: 100 INJECTION, SOLUTION INTRAVENOUS; SUBCUTANEOUS at 09:08

## 2022-05-17 RX ADMIN — Medication 30 ML: at 09:03

## 2022-05-17 RX ADMIN — Medication 10 MG: at 09:04

## 2022-05-17 RX ADMIN — MORPHINE SULFATE 2 MG: 2 INJECTION, SOLUTION INTRAMUSCULAR; INTRAVENOUS at 09:15

## 2022-05-17 RX ADMIN — PANTOPRAZOLE SODIUM 20 MG: 40 INJECTION, POWDER, FOR SOLUTION INTRAVENOUS at 09:05

## 2022-05-17 RX ADMIN — INSULIN LISPRO 9 UNITS: 100 INJECTION, SOLUTION INTRAVENOUS; SUBCUTANEOUS at 21:11

## 2022-05-17 RX ADMIN — METOPROLOL TARTRATE 25 MG: 25 TABLET, FILM COATED ORAL at 21:03

## 2022-05-17 RX ADMIN — INSULIN GLARGINE 20 UNITS: 100 INJECTION, SOLUTION SUBCUTANEOUS at 09:02

## 2022-05-17 RX ADMIN — QUETIAPINE FUMARATE 12.5 MG: 25 TABLET ORAL at 21:05

## 2022-05-17 ASSESSMENT — PAIN DESCRIPTION - FREQUENCY
FREQUENCY: CONTINUOUS
FREQUENCY: CONTINUOUS

## 2022-05-17 ASSESSMENT — PAIN SCALES - GENERAL
PAINLEVEL_OUTOF10: 6
PAINLEVEL_OUTOF10: 10
PAINLEVEL_OUTOF10: 10

## 2022-05-17 ASSESSMENT — PAIN DESCRIPTION - PAIN TYPE
TYPE: SURGICAL PAIN
TYPE: SURGICAL PAIN

## 2022-05-17 ASSESSMENT — PAIN DESCRIPTION - LOCATION
LOCATION: ABDOMEN
LOCATION: ABDOMEN

## 2022-05-17 ASSESSMENT — PAIN DESCRIPTION - ONSET
ONSET: ON-GOING
ONSET: ON-GOING

## 2022-05-17 ASSESSMENT — PAIN DESCRIPTION - DESCRIPTORS
DESCRIPTORS: ACHING
DESCRIPTORS: ACHING

## 2022-05-17 ASSESSMENT — PAIN DESCRIPTION - ORIENTATION
ORIENTATION: OTHER (COMMENT)
ORIENTATION: OTHER (COMMENT)

## 2022-05-17 NOTE — PROGRESS NOTES
NGT clamped for 4 hours. Residual output 200cc. Pt placed back on previous suction setting (continuous low suction). Surgeon notified.

## 2022-05-17 NOTE — PROGRESS NOTES
[facility]   Nephrology progress Note  [phone]   [facility]. [employer]           SUMMARY :  We are following this patient for ESRD on maintenance hemodialysis   73-year-old female with past medical history of ESRD on maintenance hemodialysis, CVA, morbid obesity, type 2 diabetes, pulmonary hypertension, atrial fibrillation, was sent to from the [address] [facility] home with shortness of breath    SUBJECTIVE:   Patient progress reviewed. Seen on HD  The patient says she has abdominal pain      Physical Exam:    VITALS:  /65   Pulse 93   Temp 97.5 °F (36.4 °C) (Axillary)   Resp 18   Ht 5' 4\" (1.626 m)   Wt 286 lb 11.2 oz (130 kg)   SpO2 100%   BMI 49.21 kg/m²   BLOOD PRESSURE RANGE:  Systolic (38TDN), SCF:682 , Min:101 , GKJ:402   ; Diastolic (25BIV), XPA:70, Min:52, Max:79    24HR INTAKE/OUTPUT:      Intake/Output Summary (Last 24 hours) at 5/17/2022 1107  Last data filed at 5/17/2022 0659  Gross per 24 hour   Intake 2035.79 ml   Output 1000 ml   Net 1035.79 ml       Gen:  alert, oriented x 3, Obese  PERRL , EOM +  Pallor +, No icterus  Tracheostomy +  JVP not raised   CV: RRR no murmur or rub . Lungs:B/ L air entry, Normal breath sounds   Abd: soft, bowel sounds + , No organomegaly   Ext: No edema, no cyanosis  Skin: Warm.   No rash  Neuro: nonfocal.  Left upper arm AVG , thrill +      DATA:    CBC with Differential:    Lab Results   Component Value Date    WBC 10.9 05/17/2022    RBC 2.52 05/17/2022    HGB 7.5 05/17/2022    HCT 24.0 05/17/2022     05/17/2022    MCV 95.2 05/17/2022    MCH 29.8 05/17/2022    MCHC 31.3 05/17/2022    RDW 19.3 05/17/2022    BANDSPCT 2 05/01/2022    LYMPHOPCT 7.1 05/06/2022    MONOPCT 3.5 05/06/2022    MYELOPCT 1 04/30/2022    BASOPCT 0.9 05/06/2022    MONOSABS 0.4 05/06/2022    LYMPHSABS 0.9 05/06/2022    EOSABS 0.2 05/06/2022    BASOSABS 0.1 05/06/2022     CMP:    Lab Results   Component Value Date     05/17/2022    K 4.2 05/17/2022    K 4.7 05/06/2022    CL 93 05/17/2022    CO2 23 05/17/2022    BUN 94 05/17/2022    CREATININE 5.8 05/17/2022    GFRAA 9 05/17/2022    AGRATIO 1.0 05/12/2022    LABGLOM 7 05/17/2022    GLUCOSE 224 05/17/2022    PROT 6.7 05/12/2022    LABALBU 3.3 05/12/2022    CALCIUM 9.4 05/17/2022    BILITOT 0.6 05/12/2022    ALKPHOS 68 05/12/2022    AST <5 05/12/2022    ALT <5 05/12/2022     Magnesium:    Lab Results   Component Value Date    MG 2.20 05/17/2022     Phosphorus:    Lab Results   Component Value Date    PHOS 4.2 05/16/2022     Uric Acid:    Lab Results   Component Value Date    LABURIC 6.3 04/29/2022     Troponin:    Lab Results   Component Value Date    TROPONINI 0.20 05/06/2022     U/A:  No results found for: NITRITE, COLORU, PROTEINU, PHUR, LABCAST, WBCUA, RBCUA, MUCUS, TRICHOMONAS, YEAST, BACTERIA, CLARITYU, SPECGRAV, LEUKOCYTESUR, UROBILINOGEN, BILIRUBINUR, BLOODU, GLUCOSEU, AMORPHOUS      IMPRESSION/RECOMMENDATIONS:      Diagnosis and Plan     1. ESRD on HD  TTS at [facility]     2. Anemia of CKD/ vaginal Bleeding : target Hb 9-11    3. Renal osteodystrophy : Monitor Ca and phos   4.  Left Renal mass ( CT abdomen and pelvis  5/6/22)   Numerous cysts are identified within the   kidneys bilaterally.  Many of the cysts are simple fluid in density, however   several are also not consistent with simple cysts. Samara Contes is a 40 Hounsfield   unit lesion in the lower pole the left kidney measuring 3.6 cm in size.       Urology Consulted     5.  5/10/2022, exploratory laparotomy with lysis of adhesions for small bowel obstruction    Seun Bruno MD

## 2022-05-17 NOTE — RT PROTOCOL NOTE
RT Inhaler-Nebulizer Bronchodilator Protocol Note    There is a bronchodilator order in the chart from a provider indicating to follow the RT Bronchodilator Protocol and there is an Initiate RT Inhaler-Nebulizer Bronchodilator Protocol order as well (see protocol at bottom of note). CXR Findings:  XR CHEST PORTABLE    Result Date: 5/16/2022  Mild increased pulmonary vascular congestion. The findings from the last RT Protocol Assessment were as follows:   History Pulmonary Disease: Chronic pulmonary disease  Respiratory Pattern: Regular pattern and RR 12-20 bpm  Breath Sounds: Slightly diminished and/or crackles  Cough: Strong, productive  Indication for Bronchodilator Therapy: Decreased or absent breath sounds  Bronchodilator Assessment Score: 5    Aerosolized bronchodilator medication orders have been revised according to the RT Inhaler-Nebulizer Bronchodilator Protocol below. Respiratory Therapist to perform RT Therapy Protocol Assessment initially then follow the protocol. Repeat RT Therapy Protocol Assessment PRN for score 0-3 or on second treatment, BID, and PRN for scores above 3. No Indications - adjust the frequency to every 6 hours PRN wheezing or bronchospasm, if no treatments needed after 48 hours then discontinue using Per Protocol order mode. If indication present, adjust the RT bronchodilator orders based on the Bronchodilator Assessment Score as indicated below. Use Inhaler orders unless patient has one or more of the following: on home nebulizer, not able to hold breath for 10 seconds, is not alert and oriented, cannot activate and use MDI correctly, or respiratory rate 25 breaths per minute or more, then use the equivalent nebulizer order(s) with same Frequency and PRN reasons based on the score. If a patient is on this medication at home then do not decrease Frequency below that used at home.     0-3 - enter or revise RT bronchodilator order(s) to equivalent RT Bronchodilator order with Frequency of every 4 hours PRN for wheezing or increased work of breathing using Per Protocol order mode. 4-6 - enter or revise RT Bronchodilator order(s) to two equivalent RT bronchodilator orders with one order with BID Frequency and one order with Frequency of every 4 hours PRN wheezing or increased work of breathing using Per Protocol order mode. 7-10 - enter or revise RT Bronchodilator order(s) to two equivalent RT bronchodilator orders with one order with TID Frequency and one order with Frequency of every 4 hours PRN wheezing or increased work of breathing using Per Protocol order mode. 11-13 - enter or revise RT Bronchodilator order(s) to one equivalent RT bronchodilator order with QID Frequency and an Albuterol order with Frequency of every 4 hours PRN wheezing or increased work of breathing using Per Protocol order mode. Greater than 13 - enter or revise RT Bronchodilator order(s) to one equivalent RT bronchodilator order with every 4 hours Frequency and an Albuterol order with Frequency of every 2 hours PRN wheezing or increased work of breathing using Per Protocol order mode. RT to enter RT Home Evaluation for COPD & MDI Assessment order using Per Protocol order mode.     Electronically signed by Mario Ramirez RCP on 5/17/2022 at 3:40 PM

## 2022-05-17 NOTE — PROGRESS NOTES
Nutrition Note    RECOMMENDATIONS  1. PO Diet: NPO  2. Nutrition Support:   Recommend continue to check Mg, Phos, CMP daily. Recommend continue Clinimix 5/20 at goal rate of 83 mL/hr. Physician/LIP to monitor and correct lytes (Phos,Mg,K+). Recommend 250 mL 20% lipids 2 times per week, due to start on 5/19  Recommend FSBS, monitor glucose, need for insulin  Pharmacy to adjust MVI and Trace Elements as needed     NUTRITION ASSESSMENT   Pt triggered for follow-up. S/p exploratory laparotomy with lysis of adhesions for small bowel obstruction on 5/10, now with postoperative ileus. TPN started 5/11 and currently at goal rate of 83 mL/hr. Continue with NG tube for suction. Still NPO. Recommend continue TPN as is and begin po diet as appropriate. RD will continue to monitor.  Nutrition Related Findings: -1.9L. Na 131, Cl 93. Glucose averaging over 200 - may need insulin adjustment. LBM 5/15, BS+. +1 non-pitting generalized and LUE edema. Oriented x2. on HD.  Wounds: Surgical Incision,Wound Vac,Skin Tears,Multiple   Nutrition Education:  Education not indicated    Nutrition Goals:  Tolerate nutrition support at goal rate,Initiate PO diet,by next RD assessment     MALNUTRITION ASSESSMENT   Acute Illness  Malnutrition Status: No malnutrition    NUTRITION DIAGNOSIS   · Inadequate oral intake related to altered GI function as evidenced by NPO or clear liquid status due to medical condition,nutrition support - parenteral nutrition    CURRENT NUTRITION THERAPIES  Diet NPO Exceptions are: Ice Chips, Sips of Water with Meds, Sips of Clear Liquids, Popsicles  PN-Adult Premix 5/20 - Standard Electrolytes - Central Line     PO Intake: NPO   PO Supplement Intake:NPO    Current Parenteral Nutrition Recs:  · Type and Formula: Premix Central   · Lipids: Two times weekly  · Duration: Continuous  · Current PN Order Provides: As below  · Goal PN Orders Provides: Clinimix 5/20 at 83 mL/hr provides 1992 mL total volume, 1755 kcal, 100g protein, dex load 2.1 mg/kg/min    ANTHROPOMETRICS   Current Height: 5' 4\" (162.6 cm)   Current Weight: 286 lb 11.2 oz (130 kg)     Admission weight: 298 lb (135.2 kg)   Ideal Body Weight (IBW): 120 lbs  (55 kg)     Usual Bodyweight         BMI: 49.1    COMPARATIVE STANDARDS  Energy (kcal):  968 - 1815     Protein (g):  99 - 109       Fluid (mL/day):  1815    The patient will be monitored per nutrition standards of care. Consult dietitian if additional nutrition interventions are needed prior to RD reassessment.      Elvira Wallace RD, LD    Contact: [phone]

## 2022-05-17 NOTE — PROGRESS NOTES
[facility] PROGRESS NOTE    5/17/2022 3:38 PM        Name: Simeon Swanson . Admitted: 5/6/2022  Primary Care Provider: Laura Luna MD (Tel: [phone])      Subjective:  . Patient seen while at dialysis  NG in place   Complains of some pain under R breast. Going to have NG clamped this afternoon. Denies cough or SOB.     Was liberated from the vent on POD # 1 ( 5/11/22)      POD # 6 from [address] with VIC for SBO     Reviewed interval ancillary notes    Current Medications  PN-Adult Premix 5/20 - Standard Electrolytes - Central Line, Continuous TPN  metoclopramide (REGLAN) injection 5 mg, Q6H  insulin glargine (LANTUS) injection vial 30 Units, BID  PN-Adult Premix 5/20 - Standard Electrolytes - Central Line, Continuous TPN  albuterol (PROVENTIL) nebulizer solution 2.5 mg, BID  bisacodyl (DULCOLAX) suppository 10 mg, Daily  diphenhydrAMINE-zinc acetate cream, TID PRN  insulin lispro (HUMALOG) injection vial 0-18 Units, Q4H  0.9 % sodium chloride infusion, PRN  albuterol (PROVENTIL) nebulizer solution 2.5 mg, Q4H PRN  0.9 % sodium chloride infusion, PRN  [START ON 5/19/2022] fat emulsion 20 % infusion 250 mL, Once per day on Mon Thu  epoetin claire-epbx (RETACRIT) injection 10,000 Units, Once per day on Tue Thu Sat  morphine (PF) injection 2 mg, Q2H PRN   Or  morphine injection 4 mg, Q2H PRN  metoprolol tartrate (LOPRESSOR) tablet 25 mg, BID  QUEtiapine (SEROQUEL) tablet 12.5 mg, Nightly  pantoprazole (PROTONIX) injection 20 mg, Daily  metoprolol (LOPRESSOR) injection 5 mg, Q6H PRN  LORazepam (ATIVAN) injection 0.5 mg, Q12H PRN  sodium chloride flush 0.9 % injection 5-40 mL, 2 times per day  0.9 % sodium chloride infusion, PRN  ondansetron (ZOFRAN-ODT) disintegrating tablet 4 mg, Q8H PRN   Or  ondansetron (ZOFRAN) injection 4 mg, Q6H PRN  acetaminophen (TYLENOL) tablet 650 mg, Q6H PRN   Or  acetaminophen (TYLENOL) suppository 650 mg, Q6H PRN  sodium chloride flush 0.9 % injection 5-40 mL, 2 times per day  sodium chloride flush 0.9 % injection 5-40 mL, PRN  0.9 % sodium chloride infusion, PRN  heparin (porcine) injection 5,000 Units, TID  polyethylene glycol (GLYCOLAX) packet 17 g, Daily PRN  glucose (GLUTOSE) 40 % oral gel 15 g, PRN  glucagon (rDNA) injection 1 mg, PRN  dextrose 5 % solution, PRN  dextrose bolus 10% 125 mL, PRN   Or  dextrose bolus 10% 250 mL, PRN  heparin (porcine) injection 1,000 Units, Once  diphenhydrAMINE (BENADRYL) injection 25 mg, Q6H PRN        Objective:  /69   Pulse 103   Temp 98 °F (36.7 °C) (Axillary)   Resp 19   Ht 5' 4\" (1.626 m)   Wt 286 lb 11.2 oz (130 kg)   SpO2 100%   BMI 49.21 kg/m²     Intake/Output Summary (Last 24 hours) at 5/17/2022 1538  Last data filed at 5/17/2022 0900  Gross per 24 hour   Intake 2035.79 ml   Output 1250 ml   Net 785.79 ml      Wt Readings from Last 3 Encounters:   05/17/22 286 lb 11.2 oz (130 kg)   05/01/22 (!) 309 lb 8 oz (140.4 kg)   04/26/22 291 lb (132 kg)       General appearance:  Appears chronically ill and obese. Awake, she is alert and oriented but wants to rest, morbidly obese  Eyes: Sclera clear. Pupils equal.  ENT: Moist oral mucosa. Trachea midline, no adenopathy. NG in place  Cardiovascular: Regular rhythm, normal S1, S2. No murmur. No edema in lower extremities  Respiratory: Not using accessory muscles. trach collar in place with 8 liters of oxygen, no ronchi or wheezing. Decreased in bases   GI: Abdomen softly distended and obese with rare bowel sounds. NG to suction with green drainage. abd dressing is clean dry intact.   abd binder is in place    Musculoskeletal: No cyanosis in digits, neck supple  Neurology: moving arms and her head, sedation is being weaned   Psych: sedated   Skin: Warm, dry, poor  Turgor, scratch marks notes on chest and arms but appear to be healing     Labs and Tests:  CBC:   Recent Labs 05/15/22  0543 05/16/22  0530 05/17/22  0545   WBC 10.7 11.9* 10.9   HGB 7.8* 7.6* 7.5*    246 244     BMP:    Recent Labs     05/15/22  0543 05/16/22  0530 05/17/22  0545    130* 131*   K 4.3 4.3 4.2   CL 96* 91* 93*   CO2 23 25 23   BUN 98* 71* 94*   CREATININE 7.4* 5.4* 5.8*   GLUCOSE 253* 302* 224*     Hepatic:   No results for input(s): AST, ALT, ALB, BILITOT, ALKPHOS in the last 72 hours. Problem List  Principal Problem:    SBO (small bowel obstruction) ([facility])  Active Problems:    Fecal impaction (HCC)    ESRD on dialysis ([facility] [address]    Chronic respiratory failure requiring continuous mechanical ventilation through tracheostomy ([facility] [address]    History of CVA (cerebrovascular accident)    Anemia, chronic disease    Chronic diastolic heart failure ([facility] [address]    Essential hypertension    GERD (gastroesophageal reflux disease)    Morbid obesity with BMI of 50.0-59.9, adult ([facility])    SILVER (obstructive sleep apnea)  Resolved Problems:    * No resolved hospital problems. *       Assessment & Plan:   1. SBO: POD # 6 from Exp Lap with VIC: GS following. Still has NG tube. Pt is bed bound and has not been able to walk for several years. 2. PAF-  On BB, rate in the 80 - 90   3. ESRD:  On HD q [facility], Saturday,  Nephrology is following  4. Hx of respiratory failure:  S/p trach > 1 year ago. She has been extubated, currently on 8 liters oxygen. She refuses to use IS. Will check portable chest xray and add pro calcitonin   5. T2DM:  Current hyperglycemia likely due to TPN given AIC of 5.4  Will increased lantus to 30 units BID   6. Hx of DVT: coumadin on hold ,on SC heparin  7. Malnutrition: TPN started 5/11  8. Bilateral renal cysts per CT scan: CT with and without contrast of kidney per renal mass protocol outpatient to exclude neoplasm   9. Acute on chronic anemia:  on retacrit , Hgb 7.4, stable,   She has had 2 units post operatively    10.  Discussed with surgery the possibility of transfer to [facility] with anticipation of  eventual d/c back to [facility] in [place] where she is a permanent resident       Diet: Diet NPO Exceptions are: Ice Chips, Sips of Water with Meds, Sips of Clear Liquids, Popsicles  PN-Adult Premix 5/20 - Standard Electrolytes - Central Line  PN-Adult Premix 5/20 - Standard Electrolytes - Central Line  Code:Full Code  DVT PPX    Claudean Bogus, PA-C   5/17/2022 3:38 PM

## 2022-05-17 NOTE — PROGRESS NOTES
05/17/22 1539   RT Protocol   History Pulmonary Disease 2   Respiratory pattern 0   Breath sounds 2   Cough 1   Bronchodilator Assessment Score 5

## 2022-05-17 NOTE — PROGRESS NOTES
Clinical Pharmacy Note    Pharmacy consulted by Dr. Jazzy Krishna to manage TPN    Current TPN rate: 83 ml/hr  Goal TPN rate: 83 ml/hr    Labs:  General Labs:  BMP:    Lab Results   Component Value Date     05/17/2022    K 4.2 05/17/2022    K 4.7 05/06/2022    CL 93 05/17/2022    CO2 23 05/17/2022    BUN 94 05/17/2022    LABALBU 3.3 05/12/2022    CREATININE 5.8 05/17/2022    CALCIUM 9.4 05/17/2022    GFRAA 9 05/17/2022    LABGLOM 7 05/17/2022    GLUCOSE 224 05/17/2022     Magnesium:    Lab Results   Component Value Date    MG 2.20 05/17/2022     Phosphorus:    Lab Results   Component Value Date    PHOS 4.2 05/16/2022     Blood sugars: 224-267    Blood sugar management:  Increasing Lantus to 25 units BID. Plan to continue TPN to 83 ml/hr. Thank you for allowing pharmacy to participate in the care of this patient.     Ruiz Perez [facility], PharmD 5/17/2022

## 2022-05-17 NOTE — CONSULTS
Urology Consult Note  [facility]     Patient: Indira Love MRN: [id number]  Room/Bed: [facility]-1259/7671-35   YOB: 1950  Age/Sex: 80 y. o.female  Admission Date: 5/6/2022     Date of Service:  5/17/2022    Consulting Provider: ITALO Bang CNP  Admitting/Requesting Physician: Carrington Santiago MD  Primary Care Physician: Rashida Hilario MD    Reason for Consult: Renal Lesion    ASSESSMENT/PLAN     79 yo F with hx of ESRD on HD. Chronic respiratory failure s/p trach. Hx of CVA, Morbid obesity, DM2, Afib on coumadin. She was brought to the ER for abdominal pain associated with N/V and SOB. Found to have a small bowel obstruction in the ED, now s/p ex-lap with lysis of adhesions 05/10/2022. Urology has been consulted for CT a/p WO 05/06 demonstrating a 3.6cm LLP lesion. Recommendations:  No urgent  intervention. Patient confused on exam. Discussed case with the patient's daughter Keisha Bell, who is a hospice nurse. Discussed LLP lesion on CT is 3.6cm and the ultimate concern would be malignancy, although this cannot be confirmed with the current CT modality. We discussed if this is indeed a renal mass, it would have a low chance of metastasis, being <4cm. It seems this lesion would be amenable to partial nephrectomy. Given the patient's numerous co-morbidities, the decision to proceed to the OR for a major surgery should be carefully considered against the low risk of metastasis. We then discussed surveillance and the daughter seemed to agree this may be a preferred course of action but wanted to consult with her mother. All the patients questions were answered. She understands the plan as listed above. She had no unanswered questions. Follow up has been requested. HISTORY     Chief Complaint:   Chief Complaint   Patient presents with    Other     Patient has missed dialysis txs, states is in pain and wants meds, pt has trach.         History of Present Illness: [place] Maki is a 80 y.o. female with Left renal mass and SBO. She has no pain or symptoms associated with this mass and was incidentally found on CT a/p 05/06/2022. She has had no change in clinical course related to this renal mass, she is improving from other medical issues such as SBO with NGT insertion. She has tried the following treatments: NGT, fluids, NPO. We will arrange follow up for surveillance of this lesion. Past Medical History:  She has a past medical history of Acute and chronic respiratory failure (acute-on-chronic) ([address] Acute blood loss anemia (7/1/2020), Acute deep vein thrombosis (DVT) of brachial vein of left upper extremity ([address] (2/20/2019), Anxiety disorder, Atherosclerotic heart disease of native coronary artery without angina pectoris, Bleeding hemorrhoids (6/29/2020), Cerebellar infarct ([address] (9/29/2019), Cerebral infarction ([address] Chronic pain syndrome, Dependence on renal dialysis ([address] Dependence on respirator ([address] End stage renal disease ([address] Gastro-esophageal reflux disease with esophagitis, without bleeding, Insomnia, Major depressive disorder, recurrent ([address] Morbid obesity due to excess calories ([address] Muscle weakness, Obstructive sleep apnea (adult) (pediatric), Other hyperlipidemia, Other voice and resonance disorders, Personal history of COVID-19, Pulmonary hypertension ([address] Tracheostomy status ([address] Type 2 diabetes mellitus without complications ([address] and Unspecified atrial fibrillation ([address] Past Surgical History:  She has a past surgical history that includes colectomy (N/A, 5/10/2022). Allergies: Allergies   Allergen Reactions    Haloperidol Lactate Other (See Comments)     PT DOESN'T REMEMBER      Ziprasidone Hcl Other (See Comments)     PT DOESN'T REMEMBER          Social History:  She reports that she has quit smoking. She has never used smokeless tobacco. She reports that she does not drink alcohol and does not use drugs.      Family History:  family history is not on file. Medications:  Scheduled Meds:   insulin glargine  20 Units SubCUTAneous BID    albuterol  2.5 mg Nebulization BID    bisacodyl  10 mg Rectal Daily    insulin lispro  0-18 Units SubCUTAneous Q4H    [START ON 5/19/2022] fat emulsion  250 mL IntraVENous Once per day on Mon Thu    epoetin claire-epbx  10,000 Units IntraVENous Once per day on Tue Thu Sat    metoprolol tartrate  25 mg Oral BID    QUEtiapine  12.5 mg Oral Nightly    pantoprazole  20 mg IntraVENous Daily    sodium chloride flush  5-40 mL IntraVENous 2 times per day    sodium chloride flush  5-40 mL IntraVENous 2 times per day    heparin (porcine)  5,000 Units SubCUTAneous TID    heparin (porcine)  1,000 Units IntraVENous Once     Continuous Infusions:   PN-Adult Premix 5/20 - Standard Electrolytes - Central Line 83 mL/hr at 05/16/22 1910    sodium chloride      sodium chloride      sodium chloride Stopped (05/13/22 1453)    sodium chloride Stopped (05/12/22 1746)    dextrose       PRN Meds:diphenhydrAMINE-zinc acetate, sodium chloride, albuterol, sodium chloride, morphine **OR** morphine, metoprolol, LORazepam, sodium chloride, ondansetron **OR** ondansetron, acetaminophen **OR** acetaminophen, sodium chloride flush, sodium chloride, polyethylene glycol, glucose, glucagon (rDNA), dextrose, dextrose bolus **OR** dextrose bolus, diphenhydrAMINE    Review of Systems:  Pertinent positives/negatives reviewed in HPI. All other systems reviewed and negative, unless noted below.     Constitutional: Negative  Genitourinary:  Negative  HEENT: Negative   Cardiovascular: Negative   Respiratory: Negative   Gastrointestinal: Negative   Musculoskeletal: Negative   Neurological: Negative   Psychiatric: Negative   Integumentary: Negative     PHYSICAL EXAM     Vitals:    05/17/22 0715   BP: 104/65   Pulse: 93   Resp: 18   Temp: 97.5 °F (36.4 °C)   SpO2: 100%     Constitutional: Obese, NAD, well-developed, well-nourished. HEENT: MMM. Hearing intact. Neck: no thyroid masses appreciated. Trachea is midline. Neck appears unremarkable. Lymph: no palpable adenopathy in supraclavicular, or axillary lymph nodes. Cardiovascular: Regular rate. No peripheral edema. ABDOMEN: NGT in place, Abdomen soft, non-tender, non-distended. No enlarged liver or spleen. No hernias noted. Stool occult blood not indicated. Respiratory: Respirations are even and non-labored. No audible breath sounds. Genitourinary: no CVAT, pelvic deferred. Psychiatric: Alert, confused, disoriented  Muskuloskeletal: TASH x 4  Skin: Pink, warm and dry. No rashes on face and arms. Intake/Output Summary (Last 24 hours) at 5/17/2022 0945  Last data filed at 5/17/2022 0659  Gross per 24 hour   Intake 2035.79 ml   Output 1000 ml   Net 1035.79 ml       LABS     CBC:   Lab Results   Component Value Date    WBC 10.9 05/17/2022    RBC 2.52 05/17/2022    HGB 7.5 05/17/2022    HCT 24.0 05/17/2022    MCV 95.2 05/17/2022    MCH 29.8 05/17/2022    MCHC 31.3 05/17/2022    RDW 19.3 05/17/2022     05/17/2022    MPV 8.5 05/17/2022     BMP:   Lab Results   Component Value Date     05/17/2022    K 4.2 05/17/2022    K 4.7 05/06/2022    CL 93 05/17/2022    CO2 23 05/17/2022    BUN 94 05/17/2022    CREATININE 5.8 05/17/2022    GLUCOSE 224 05/17/2022    CALCIUM 9.4 05/17/2022     Urinalysis: No results found for: COLORU, GLUCOSEU, BLOODU, NITRU, LEUKOCYTESUR  Urine culture: No results for input(s): Howie Mims in the last 72 hours. IMAGING     CT ABDOMEN PELVIS WO CONTRAST Additional Contrast? None    Result Date: 5/6/2022  EXAMINATION: CT OF THE ABDOMEN AND PELVIS WITHOUT CONTRAST 5/6/2022 4:26 am TECHNIQUE: CT of the abdomen and pelvis was performed without the administration of intravenous contrast. Multiplanar reformatted images are provided for review.  Dose modulation, iterative reconstruction, and/or weight based adjustment of the mA/kV was utilized to reduce the radiation dose to as low as reasonably achievable. COMPARISON: None. HISTORY: ORDERING SYSTEM PROVIDED HISTORY: abd pain TECHNOLOGIST PROVIDED HISTORY: Reason for exam:->abd pain Additional Contrast?->None Decision Support Exception - unselect if not a suspected or confirmed emergency medical condition->Emergency Medical Condition (MA) Reason for Exam: abd pain Relevant Medical/Surgical History: Other (Patient has missed dialysis txs, states is in pain and wants meds, pt has trach. ) FINDINGS: Lower Chest: Calcified granulomatous changes are identified. No focal consolidation or pleural effusion is identified. No pericardial effusion. No distal esophageal thickening. Organs: No hypodense mass identified in the liver or spleen. No adrenal mass. No pancreatic mass. No peripancreatic inflammatory process. The gallbladder is unremarkable. Numerous cysts are identified within the kidneys bilaterally. Many of the cysts are simple fluid in density, however several are also not consistent with simple cysts. There is a 40 Hounsfield unit lesion in the lower pole the left kidney measuring 3.6 cm in size. GI/Bowel: Rectosigmoid distention is seen with a small to moderate volume of stool noted. No significant stool volume elsewhere within the colon. Minimal colonic diverticulosis is identified. Mildly dilated loops of small bowel seen in the left lower quadrant, with a focal transition noted posterior to a hernia mesh and appreciated on and around axial image [id number]. There is a small bowel feces sign noted just proximal to this transition point as well. Pelvis: No pelvic mass. Masslike appearance within the uterus. Calcified fibroids are noted. No free fluid. Peritoneum/Retroperitoneum: No aortic aneurysm is identified. No retroperitoneal or mesenteric bulky lymphadenopathy. There is a omental fat containing hernia seen just above a prior anterior hernia repair.  Bones/Soft Tissues: No acute subcutaneous soft tissue abnormality is identified. Diffuse osteopenia. Degenerative changes are seen in the spine and sacroiliac joints. No osseous destructive process is identified. 1. There is a partial small bowel obstruction, with a discrete transition point noted adjacent to hernia mesh within the anterior lower abdomen. Just proximal to the transition point, a small bowel feces sign is seen related to delayed transit. This is seen on and around axial image [id number]. 2. Mild to moderate stool volume noted with rectosigmoid colonic distention. No functional obstruction related to this however. 3. Several renal cysts are seen bilaterally, many of which appearing normal in density. However there are some more hyperdense lesions, including a 3.6 cm left lower pole lesion measuring 40 Hounsfield units. These would be best characterized with dedicated MRI of the kidneys per renal mass protocol to exclude the possibility of a renal neoplasm. 4. Masslike appearance within the uterus suggestive of underlying uterine leiomyomas, as noted on recent CT imaging of the pelvis on 03/03/2022. XR ANKLE RIGHT (MIN 3 VIEWS)    Result Date: 4/26/2022  EXAMINATION: THREE XRAY VIEWS OF THE RIGHT ANKLE 4/26/2022 8:54 pm COMPARISON: None. HISTORY: ORDERING SYSTEM PROVIDED HISTORY: pain TECHNOLOGIST PROVIDED HISTORY: please include foot Reason for exam:->pain Reason for Exam: Foot and ankle pain. No known injury. Best images possible as patient would not follow commands. FINDINGS: Severe osteopenia limits evaluation. There are moderate to severe degenerative changes with narrowing of the joint space and osteophytosis. No gross fracture or dislocation. Extensive soft tissue vascular calcifications. No acute fracture identified     XR ANKLE RIGHT (MIN 3 VIEWS)    Result Date: 4/26/2022  EXAMINATION: THREE XRAY VIEWS OF THE RIGHT ANKLE 4/26/2022 1:07 pm COMPARISON: None.  HISTORY: ORDERING SYSTEM PROVIDED HISTORY: swelling TECHNOLOGIST PROVIDED HISTORY: Reason for exam:->swelling Reason for Exam: ankle pain, swelling FINDINGS: AP lateral and lateral oblique views of the right ankle reveal diffuse osteopenia without acute displaced fracture however degenerative changes at the tibiotalar joint space and within the midfoot however no acute displaced fracture     No acute displaced fracture with grossly anatomic alignment however advanced degenerative changes of the tibiotalar joint space and osteopenia limiting evaluation for subtle cortical defect     XR FOOT RIGHT (2 VIEWS)    Result Date: 4/26/2022  EXAMINATION: 2 XRAY VIEWS OF THE RIGHT FOOT 4/26/2022 8:54 pm COMPARISON: None. HISTORY: ORDERING SYSTEM PROVIDED HISTORY: pain TECHNOLOGIST PROVIDED HISTORY: Reason for exam:->pain Reason for Exam: Foot and ankle pain. No known injury. Best images possible as patient would not follow commands. FINDINGS: There is no evidence of acute fracture. There is normal alignment of the tarsometatarsal joints. No acute joint abnormality. No focal osseous lesion. No focal soft tissue abnormality. Diffuse osteopenia. No acute osseous abnormality. US PELVIS COMPLETE    Result Date: 4/27/2022  EXAMINATION: PELVIC ULTRASOUND 4/27/2022 TECHNIQUE: Transabdominal pelvic ultrasound was performed. COMPARISON: None HISTORY: ORDERING SYSTEM PROVIDED HISTORY: postmenopausal vaginal bleeding on anticoagulation TECHNOLOGIST PROVIDED HISTORY: Reason for exam:->postmenopausal vaginal bleeding on anticoagulation Reason for exam:->known fibroids FINDINGS: Measurements: Not able for visualization Ultrasound Findings: Nonvisualization     Nondiagnostic evaluation.      XR CHEST PORTABLE    Result Date: 5/16/2022  EXAMINATION: ONE XRAY VIEW OF THE CHEST 5/16/2022 3:00 pm COMPARISON: May 10, 2022 HISTORY: ORDERING SYSTEM PROVIDED HISTORY: post op TECHNOLOGIST PROVIDED HISTORY: Reason for exam:->post op Reason for Exam: Post op FINDINGS: The cardiac silhouette is enlarged. No pleural effusion or pneumothorax. Tracheostomy tube projecting near the mid clavicular heads. Left central venous catheter projects over the SVC. There is a bend in the proximal aspect of the catheter which could be external to the patient. Mild interstitial prominence favoring mild pulmonary vascular congestion. Enteric tube projects below the left hemidiaphragm likely in the proximal to mid stomach. Mild increased pulmonary vascular congestion. XR CHEST PORTABLE    Result Date: 5/10/2022  EXAMINATION: ONE XRAY VIEW OF THE CHEST 5/10/2022 7:16 pm COMPARISON: None. HISTORY: ORDERING SYSTEM PROVIDED HISTORY: central line placement TECHNOLOGIST PROVIDED HISTORY: Reason for exam:->central line placement Reason for Exam: central line placement FINDINGS: Tracheostomy in place. Esophagogastric tube coursing below the diaphragm and elevated view. Left internal jugular central venous catheter terminates within the mid SVC. No postprocedure pneumothorax     Left internal jugular central venous catheter terminates within the mid SVC. No postprocedure pneumothorax     XR CHEST PORTABLE    Result Date: 4/30/2022  EXAMINATION: ONE XRAY VIEW OF THE CHEST 4/30/2022 1:47 pm COMPARISON: 04/26/2022 HISTORY: ORDERING SYSTEM PROVIDED HISTORY: dislodged trach; mucous plugging TECHNOLOGIST PROVIDED HISTORY: Reason for exam:->dislodged trach; mucous plugging Reason for Exam: Dislodged trach; mucous plugging FINDINGS: Tracheostomy tube remains in place. The lungs are without acute focal process. There is no effusion or pneumothorax. The cardiomediastinal silhouette is stable. The osseous structures are stable. No acute process. Bibasilar hypoaeration     XR CHEST PORTABLE    Result Date: 4/26/2022  EXAMINATION: ONE XRAY VIEW OF THE CHEST 4/26/2022 3:15 pm COMPARISON: Chest x-ray dated 02/21/2022.  HISTORY: ORDERING SYSTEM PROVIDED HISTORY: wbc TECHNOLOGIST PROVIDED HISTORY: Reason for exam:->wbc Reason for Exam: elevated white blood count FINDINGS: LINES/TUBES/OTHER: Tracheostomy tube is again noted. HEART/MEDIASTINUM: The cardiac silhouette is enlarged, but stable. PLEURA/LUNGS: There are no focal consolidations or pleural effusions. There is no appreciable pneumothorax. BONES/SOFT TISSUE: No acute abnormality. No radiographic evidence of acute pulmonary disease. XR ABDOMEN FOR NG/OG/NE TUBE PLACEMENT    Result Date: 5/15/2022  EXAM: XR Abdomen, 1 View EXAM DATE/TIME: 5/15/2022 12:59 pm CLINICAL HISTORY: ORDERING SYSTEM PROVIDED  Eval bowel gas pattern and NGT placement--PORTABLE TECHNOLOGIST PROVIDED HISTORY:  Reason for exam:->Eval bowel gas pattern and NGT placement--PORTABLE portable? ->Yes Reason for Exam: ng placement TECHNIQUE: Frontal supine view of the abdomen/pelvis. COMPARISON: 05/09/2022 FINDINGS: Gastrointestinal tract:  Only the upper abdomen is included on this study but what can be seen of the bowel gas pattern suggest persistent small bowel dilation suggesting a degree of obstruction though possibly decreased in caliber from the prior study. Bones/joints:  No acute findings. Tubes, lines and devices:  NG tube extends into the body of the stomach. Tracheostomy tube in place. Left internal jugular central venous line with the tip in the upper SVC. 1.  NG tube extends into the body of the stomach. 2.  Only the upper abdomen is included on this study but what can be seen of the bowel gas pattern suggest persistent small bowel dilation suggesting a degree of obstruction though possibly decreased in caliber from the prior study.      XR ABDOMEN FOR NG/OG/NE TUBE PLACEMENT    Result Date: 5/7/2022  EXAMINATION: ONE SUPINE XRAY VIEW(S) OF THE ABDOMEN 5/6/2022 10:06 pm COMPARISON: 05/06/2022 HISTORY: ORDERING SYSTEM PROVIDED HISTORY: Confirmation of course of NG/OG/NE tube and location of tip of tube TECHNOLOGIST PROVIDED HISTORY: Reason for exam:->Confirmation of course of NG/OG/NE tube and location of tip of tube Portable? ->Yes FINDINGS: Nasogastric tube is coursing into the stomach with the side port just below the level of the diaphragm. Could be advanced by 5 cm. Tracheostomy is also redemonstrated. The gas distended small bowel loops in the upper abdomen. Cardiac silhouette is mildly enlarged with some mild central vascular congestion. NG tube courses into the stomach but can be advanced by 5 cm for better positioning. Gas distended small bowel loops in the upper abdomen. XR ABDOMEN FOR NG/OG/NE TUBE PLACEMENT    Result Date: 5/6/2022  EXAMINATION: ONE SUPINE XRAY VIEW(S) OF THE ABDOMEN 5/6/2022 6:55 am COMPARISON: None. HISTORY: ORDERING SYSTEM PROVIDED HISTORY: NG Tube Placement TECHNOLOGIST PROVIDED HISTORY: Reason for exam:->NG Tube Placement Portable? ->Yes Reason for Exam: NG Tube Placement FINDINGS: Exam is limited by patient body habitus and patient positioning Tip of ET tube projects at the level of the midthoracic trachea NG tube is seen. The tip appears to terminate in the region of the stomach Heart is enlarged. Tip of NG tube projects in region of stomach     FL SMALL BOWEL FOLLOW THROUGH ONLY    Result Date: 5/10/2022  EXAMINATION: SMALL BOWEL FOLLOW THROUGH SERIES 5/9/2022 TECHNIQUE: Small bowel follow through series was performed with overhead images and spot images. FLUOROSCOPY DOSE AND TYPE OR TIME AND EXPOSURES: 0 COMPARISON: 05/07/2022 HISTORY: ORDERING SYSTEM PROVIDED HISTORY: water soluble contrast through NG, eval SBO TECHNOLOGIST PROVIDED HISTORY: Reason for exam:->water soluble contrast through NG, eval SBO Reason for Exam: eval SBO FINDINGS: There worsening dilated loops of small bowel throughout the abdomen likely due to a partial small bowel obstruction. The nasogastric tube terminates in gastric fundus. Degenerative changes involve the lumbar spine and bilateral hips. The bones are demineralized.  The contrast never reaches the cecum even after 18 hours. 1. Worsening partial small bowel obstruction. XR ABDOMEN (2 VIEWS)    Result Date: 5/7/2022  EXAMINATION: TWO XRAY VIEWS OF THE ABDOMEN 5/7/2022 11:45 am COMPARISON: 05/06/2022. HISTORY: ORDERING SYSTEM PROVIDED HISTORY: follow up sbo TECHNOLOGIST PROVIDED HISTORY: Reason for exam:->follow up sbo Reason for Exam: sbo FINDINGS: Evaluation is limited by patient's body habitus. Multiple dilated small bowel loops in the upper abdomen with gradual transition in the mid and lower abdomen. Bowel loops measure up to 4.5 cm in diameter. Enteric catheter is coiled in the proximal stomach. Scattered gas and stool is present within the colon. Persistent dilated small bowel loops consistent with a mid small bowel obstruction, likely partial or intermittent.             Electronically signed by: ITALO Urban CNP, 5/17/2022   [facility]  Office contact: [phone]

## 2022-05-17 NOTE — PROGRESS NOTES
[facility]        Progress Note    Pt Name: Carlos Burks  MRN: [id number]  [place]: 1950  Date of evaluation: 2022    Subjective:    No acute events overnight  Incisional pain controlled  No nausea or vomiting, NGT in place with decreasing output  Passing flatus and stool  Resting in bed at this time    Postoperative Day #7      Vital Signs:  Patient Vitals for the past 24 hrs:   BP Temp Temp src Pulse Resp SpO2 Height Weight   22 0848 -- -- -- -- -- -- 5' 4\" (1.626 m) --   22 0839 -- -- -- -- -- -- -- 286 lb 11.2 oz (130 kg)   22 0715 104/65 97.5 °F (36.4 °C) Axillary 93 18 100 % -- --   22 0355 (!) 101/52 98.2 °F (36.8 °C) Axillary 90 18 100 % -- --   22 0350 -- -- -- -- -- 100 % -- --   22 0023 (!) 142/76 98.2 °F (36.8 °C) Axillary 97 18 100 % -- --   22 2342 -- -- -- -- -- 100 % -- --   22 1945 124/65 98.1 °F (36.7 °C) Axillary 86 18 99 % -- --   22 1944 -- -- -- -- -- 99 % -- --   22 1743 -- -- -- 89 -- -- -- --   22 1730 138/79 98.4 °F (36.9 °C) Oral 89 18 -- -- --   22 1715 138/79 98.4 °F (36.9 °C) Oral 89 18 99 % -- --   22 1300 116/74 98.7 °F (37.1 °C) Oral 99 18 99 % -- --      TEMPERATURE HISTORY 24H: Temp (24hrs), Av.2 °F (36.8 °C), Min:97.5 °F (36.4 °C), Max:98.7 °F (37.1 °C)    BLOOD PRESSURE HISTORY: Systolic (57EYZ), RUW:068 , Min:90 , RCS:186    Diastolic (04TCR), YRB:49, Min:52, Max:79      Intake/Output:    I/O last 3 completed shifts: In: 2095.8 [NG/GT:60]  Out: 1600 [Emesis/NG output:1600]  No intake/output data recorded.   Drain/tube Output:         Physical Exam:  General: awake, drowsy, trach collar, interacts appropriately  Pulmonary: unlabored respirations  Abdomen: soft, mild distension verses obesity, appropriate incisional tenderness only, bowel sounds present  Skin/Wound: PREM dressing in place, seal intact, pump functioning    Labs:  CBC: Recent Labs     05/15/22  0543 05/16/22  0530 05/17/22  0545   WBC 10.7 11.9* 10.9   HGB 7.8* 7.6* 7.5*   HCT 25.2* 24.5* 24.0*    246 244     BMP:    Recent Labs     05/15/22  0543 05/16/22  0530 05/17/22  0545    130* 131*   K 4.3 4.3 4.2   CL 96* 91* 93*   CO2 23 25 23   BUN 98* 71* 94*   CREATININE 7.4* 5.4* 5.8*   GLUCOSE 253* 302* 224*     Hepatic:   No results for input(s): AST, ALT, ALB, BILITOT, ALKPHOS in the last 72 hours. Amylase: No results for input(s): AMYLASE in the last 72 hours. Lipase: No results for input(s): LIPASE in the last 72 hours. Mag:      Recent Labs     05/15/22  0543 05/16/22  0530 05/17/22  0545   MG 2.20 2.20 2.20      Phos:     Recent Labs     05/15/22  0543 05/16/22  0530   PHOS 5.2* 4.2      Coags: No results for input(s): INR, APTT in the last 72 hours. Cultures:  Relevant cultures documented under results section     Pathology:  No relevant pathology    Imaging:  I have personally reviewed the following films:    XR CHEST PORTABLE    Result Date: 5/16/2022  EXAMINATION: ONE XRAY VIEW OF THE CHEST 5/16/2022 3:00 pm COMPARISON: May 10, 2022 HISTORY: ORDERING SYSTEM PROVIDED HISTORY: post op TECHNOLOGIST PROVIDED HISTORY: Reason for exam:->post op Reason for Exam: Post op FINDINGS: The cardiac silhouette is enlarged. No pleural effusion or pneumothorax. Tracheostomy tube projecting near the mid clavicular heads. Left central venous catheter projects over the SVC. There is a bend in the proximal aspect of the catheter which could be external to the patient. Mild interstitial prominence favoring mild pulmonary vascular congestion. Enteric tube projects below the left hemidiaphragm likely in the proximal to mid stomach. Mild increased pulmonary vascular congestion.      Scheduled Meds:   insulin glargine  25 Units SubCUTAneous BID    albuterol  2.5 mg Nebulization BID    bisacodyl  10 mg Rectal Daily    insulin lispro  0-18 Units SubCUTAneous Q4H    [START ON 5/19/2022] fat emulsion  250 mL IntraVENous Once per day on Mon Thu    epoetin claire-epbx  10,000 Units IntraVENous Once per day on Tue Thu Sat    metoprolol tartrate  25 mg Oral BID    QUEtiapine  12.5 mg Oral Nightly    pantoprazole  20 mg IntraVENous Daily    sodium chloride flush  5-40 mL IntraVENous 2 times per day    sodium chloride flush  5-40 mL IntraVENous 2 times per day    heparin (porcine)  5,000 Units SubCUTAneous TID    heparin (porcine)  1,000 Units IntraVENous Once     Continuous Infusions:   PN-Adult Premix 5/20 - Standard Electrolytes - Central Line      PN-Adult Premix 5/20 - Standard Electrolytes - Central Line 83 mL/hr at 05/16/22 1910    sodium chloride      sodium chloride      sodium chloride Stopped (05/13/22 1453)    sodium chloride Stopped (05/12/22 1746)    dextrose       PRN Meds:.diphenhydrAMINE-zinc acetate, sodium chloride, albuterol, sodium chloride, morphine **OR** morphine, metoprolol, LORazepam, sodium chloride, ondansetron **OR** ondansetron, acetaminophen **OR** acetaminophen, sodium chloride flush, sodium chloride, polyethylene glycol, glucose, glucagon (rDNA), dextrose, dextrose bolus **OR** dextrose bolus, diphenhydrAMINE      Assessment:  OR Date 5/10/2022, exploratory laparotomy with lysis of adhesions for small bowel obstruction  Fecal impaction  Chronic respiratory failure on trach/vent  Hypertension  Diabetes  End-stage renal disease on hemodialysis  Paroxysmal atrial fibrillation  History of DVT, on Coumadin  Vaginal bleeding  Morbid obesity, BMI 44.3  Postoperative ileus      Plan:  1. Abdominal exam appropriate, passing flatus and stool, NGT output decreasing, PREM dressing intact--keep in place; continued supportive care, add Reglan x2 doses  2. NPO, NGT clamp trial--if tolerates and residual output is 150 mL or less after 4 hours, remove NGT; monitor bowel function--continue Dulcolax suppository daily  3.  IV hydration and electrolyte correction per nephrology  4. Activity as tolerated  5. PRN analgesics and antiemetics--minimizing narcotics as tolerated  6. DVT prophylaxis with heparin injections; hold oral anticoagulation  7. Management of medical comorbid etiologies per primary team and consulting services  8. Disposition: Discharge planning, possible LTAC prior to returning to [facility]    EDUCATION:  Educated patient on plan of care and disease process--all questions answered. Plans discussed with patient and nursing. Reviewed and discussed with Dr. Jesusita Laurent.       Signed:  ITALO Dumont - CNP  5/17/2022 12:54 PM     Surgery Staff:     Pt seen and examined with NP  See full note above  Abd exam seems better today, pt not very energetic towards recovery the past few days unfortunately  Will do clamping NG trial today - possibly remove NG    Quoc Mcdonald MD

## 2022-05-17 NOTE — PROGRESS NOTES
Surgeon at bedside ordering a NGT clamp trail today.  NGT clamped by surgeon at 9:00 AM. Plan to check residual output at 1:00 PM.

## 2022-05-17 NOTE — PROGRESS NOTES
Treatment time: 3 hours  Net UF: 1000    Pre weight: 130kg  Post weight: 129.0kg  EDW: TBD    Access used: Left lower arm   Access function: well    Medications or blood products given: n/a    Regular outpatient schedule:     Summary of response to treatment: tolerated okay, in pain     Copy of dialysis treatment record placed in chart, to be scanned into EMR.

## 2022-05-18 LAB
ANION GAP SERPL CALCULATED.3IONS-SCNC: 14 MMOL/L (ref 3–16)
BUN BLDV-MCNC: 70 MG/DL (ref 7–20)
CALCIUM SERPL-MCNC: 9.4 MG/DL (ref 8.3–10.6)
CHLORIDE BLD-SCNC: 92 MMOL/L (ref 99–110)
CO2: 27 MMOL/L (ref 21–32)
CREAT SERPL-MCNC: 4.5 MG/DL (ref 0.6–1.2)
GFR AFRICAN AMERICAN: 12
GFR NON-AFRICAN AMERICAN: 10
GLUCOSE BLD-MCNC: 196 MG/DL (ref 70–99)
GLUCOSE BLD-MCNC: 200 MG/DL (ref 70–99)
GLUCOSE BLD-MCNC: 206 MG/DL (ref 70–99)
GLUCOSE BLD-MCNC: 206 MG/DL (ref 70–99)
GLUCOSE BLD-MCNC: 210 MG/DL (ref 70–99)
GLUCOSE BLD-MCNC: 228 MG/DL (ref 70–99)
GLUCOSE BLD-MCNC: 234 MG/DL (ref 70–99)
HCT VFR BLD CALC: 23.5 % (ref 36–48)
HEMOGLOBIN: 7.4 G/DL (ref 12–16)
MAGNESIUM: 2.2 MG/DL (ref 1.8–2.4)
MCH RBC QN AUTO: 29.5 PG (ref 26–34)
MCHC RBC AUTO-ENTMCNC: 31.6 G/DL (ref 31–36)
MCV RBC AUTO: 93.3 FL (ref 80–100)
PDW BLD-RTO: 19 % (ref 12.4–15.4)
PERFORMED ON: ABNORMAL
PHOSPHORUS: 4.3 MG/DL (ref 2.5–4.9)
PLATELET # BLD: 241 K/UL (ref 135–450)
PMV BLD AUTO: 8.5 FL (ref 5–10.5)
POTASSIUM SERPL-SCNC: 4.2 MMOL/L (ref 3.5–5.1)
RBC # BLD: 2.52 M/UL (ref 4–5.2)
SODIUM BLD-SCNC: 133 MMOL/L (ref 136–145)
WBC # BLD: 11.3 K/UL (ref 4–11)

## 2022-05-18 PROCEDURE — 2500000003 HC RX 250 WO HCPCS: Performed by: INTERNAL MEDICINE

## 2022-05-18 PROCEDURE — 6370000000 HC RX 637 (ALT 250 FOR IP): Performed by: SURGERY

## 2022-05-18 PROCEDURE — 6360000002 HC RX W HCPCS: Performed by: SURGERY

## 2022-05-18 PROCEDURE — 94761 N-INVAS EAR/PLS OXIMETRY MLT: CPT

## 2022-05-18 PROCEDURE — 6370000000 HC RX 637 (ALT 250 FOR IP): Performed by: NURSE PRACTITIONER

## 2022-05-18 PROCEDURE — 80048 BASIC METABOLIC PNL TOTAL CA: CPT

## 2022-05-18 PROCEDURE — 6370000000 HC RX 637 (ALT 250 FOR IP): Performed by: INTERNAL MEDICINE

## 2022-05-18 PROCEDURE — 36415 COLL VENOUS BLD VENIPUNCTURE: CPT

## 2022-05-18 PROCEDURE — APPSS15 APP SPLIT SHARED TIME 0-15 MINUTES: Performed by: NURSE PRACTITIONER

## 2022-05-18 PROCEDURE — 83735 ASSAY OF MAGNESIUM: CPT

## 2022-05-18 PROCEDURE — 6370000000 HC RX 637 (ALT 250 FOR IP): Performed by: PHYSICIAN ASSISTANT

## 2022-05-18 PROCEDURE — 85027 COMPLETE CBC AUTOMATED: CPT

## 2022-05-18 PROCEDURE — C9113 INJ PANTOPRAZOLE SODIUM, VIA: HCPCS | Performed by: SURGERY

## 2022-05-18 PROCEDURE — APPNB30 APP NON BILLABLE TIME 0-30 MINS: Performed by: NURSE PRACTITIONER

## 2022-05-18 PROCEDURE — 84100 ASSAY OF PHOSPHORUS: CPT

## 2022-05-18 PROCEDURE — 2580000003 HC RX 258: Performed by: SURGERY

## 2022-05-18 PROCEDURE — 94640 AIRWAY INHALATION TREATMENT: CPT

## 2022-05-18 PROCEDURE — 6360000002 HC RX W HCPCS: Performed by: NURSE PRACTITIONER

## 2022-05-18 PROCEDURE — 2700000000 HC OXYGEN THERAPY PER DAY

## 2022-05-18 PROCEDURE — 2060000000 HC ICU INTERMEDIATE R&B

## 2022-05-18 PROCEDURE — 99024 POSTOP FOLLOW-UP VISIT: CPT | Performed by: SURGERY

## 2022-05-18 RX ORDER — INSULIN GLARGINE 100 [IU]/ML
35 INJECTION, SOLUTION SUBCUTANEOUS 2 TIMES DAILY
Status: DISCONTINUED | OUTPATIENT
Start: 2022-05-18 | End: 2022-05-21

## 2022-05-18 RX ADMIN — MORPHINE SULFATE 2 MG: 2 INJECTION, SOLUTION INTRAMUSCULAR; INTRAVENOUS at 00:09

## 2022-05-18 RX ADMIN — METOPROLOL TARTRATE 25 MG: 25 TABLET, FILM COATED ORAL at 22:47

## 2022-05-18 RX ADMIN — PANTOPRAZOLE SODIUM 20 MG: 40 INJECTION, POWDER, FOR SOLUTION INTRAVENOUS at 08:46

## 2022-05-18 RX ADMIN — Medication 10 ML: at 21:23

## 2022-05-18 RX ADMIN — Medication 10 MG: at 08:47

## 2022-05-18 RX ADMIN — ASCORBIC ACID, VITAMIN A PALMITATE, CHOLECALCIFEROL, THIAMINE HYDROCHLORIDE, RIBOFLAVIN-5 PHOSPHATE SODIUM, PYRIDOXINE HYDROCHLORIDE, NIACINAMIDE, DEXPANTHENOL, ALPHA-TOCOPHEROL ACETATE, VITAMIN K1, FOLIC ACID, BIOTIN, CYANOCOBALAMIN: 200; 3300; 200; 6; 3.6; 6; 40; 15; 10; 150; 600; 60; 5 INJECTION, SOLUTION INTRAVENOUS at 18:49

## 2022-05-18 RX ADMIN — INSULIN GLARGINE 35 UNITS: 100 INJECTION, SOLUTION SUBCUTANEOUS at 21:24

## 2022-05-18 RX ADMIN — MORPHINE SULFATE 2 MG: 2 INJECTION, SOLUTION INTRAMUSCULAR; INTRAVENOUS at 17:14

## 2022-05-18 RX ADMIN — INSULIN LISPRO 6 UNITS: 100 INJECTION, SOLUTION INTRAVENOUS; SUBCUTANEOUS at 00:48

## 2022-05-18 RX ADMIN — INSULIN LISPRO 6 UNITS: 100 INJECTION, SOLUTION INTRAVENOUS; SUBCUTANEOUS at 20:34

## 2022-05-18 RX ADMIN — HEPARIN SODIUM 5000 UNITS: 5000 INJECTION INTRAVENOUS; SUBCUTANEOUS at 17:15

## 2022-05-18 RX ADMIN — LORAZEPAM 0.5 MG: 2 INJECTION INTRAMUSCULAR; INTRAVENOUS at 13:34

## 2022-05-18 RX ADMIN — Medication 10 ML: at 08:47

## 2022-05-18 RX ADMIN — HEPARIN SODIUM 5000 UNITS: 5000 INJECTION INTRAVENOUS; SUBCUTANEOUS at 21:21

## 2022-05-18 RX ADMIN — ALBUTEROL SULFATE 2.5 MG: 2.5 SOLUTION RESPIRATORY (INHALATION) at 21:15

## 2022-05-18 RX ADMIN — METOPROLOL TARTRATE 25 MG: 25 TABLET, FILM COATED ORAL at 08:46

## 2022-05-18 RX ADMIN — INSULIN LISPRO 6 UNITS: 100 INJECTION, SOLUTION INTRAVENOUS; SUBCUTANEOUS at 17:24

## 2022-05-18 RX ADMIN — MORPHINE SULFATE 2 MG: 2 INJECTION, SOLUTION INTRAMUSCULAR; INTRAVENOUS at 21:22

## 2022-05-18 RX ADMIN — Medication 10 ML: at 21:24

## 2022-05-18 RX ADMIN — QUETIAPINE FUMARATE 12.5 MG: 25 TABLET ORAL at 22:48

## 2022-05-18 RX ADMIN — INSULIN LISPRO 3 UNITS: 100 INJECTION, SOLUTION INTRAVENOUS; SUBCUTANEOUS at 08:49

## 2022-05-18 RX ADMIN — HEPARIN SODIUM 5000 UNITS: 5000 INJECTION INTRAVENOUS; SUBCUTANEOUS at 08:46

## 2022-05-18 RX ADMIN — ALBUTEROL SULFATE 2.5 MG: 2.5 SOLUTION RESPIRATORY (INHALATION) at 08:39

## 2022-05-18 RX ADMIN — INSULIN LISPRO 6 UNITS: 100 INJECTION, SOLUTION INTRAVENOUS; SUBCUTANEOUS at 04:27

## 2022-05-18 RX ADMIN — Medication 10 ML: at 08:48

## 2022-05-18 RX ADMIN — INSULIN LISPRO 6 UNITS: 100 INJECTION, SOLUTION INTRAVENOUS; SUBCUTANEOUS at 11:33

## 2022-05-18 RX ADMIN — INSULIN GLARGINE 30 UNITS: 100 INJECTION, SOLUTION SUBCUTANEOUS at 08:49

## 2022-05-18 ASSESSMENT — PAIN SCALES - GENERAL
PAINLEVEL_OUTOF10: 10
PAINLEVEL_OUTOF10: 10
PAINLEVEL_OUTOF10: 6
PAINLEVEL_OUTOF10: 10
PAINLEVEL_OUTOF10: 7
PAINLEVEL_OUTOF10: 10

## 2022-05-18 ASSESSMENT — PAIN DESCRIPTION - LOCATION
LOCATION: CHEST
LOCATION: GENERALIZED
LOCATION: OTHER (COMMENT)

## 2022-05-18 ASSESSMENT — PAIN DESCRIPTION - ORIENTATION: ORIENTATION: RIGHT

## 2022-05-18 NOTE — PROGRESS NOTES
Urology Progress Note  [facility]    Provider: ITALO Ch CNP  Patient ID:  Admission Date: 2022 Name: Ethan Bae  OR Date: 2022 MRN: [id number]   Patient Location: [id number] : 1950  Attending: Shannan Grady MD Date of Service: 2022  PCP: Gabbi Irving MD     Diagnoses:    ICD-10-CM    1. SBO (small bowel obstruction) (HCC)  K56.609      Renal Lesion    Assessment/Plan:  79 yo F with hx of ESRD on HD. Chronic respiratory failure s/p trach. Hx of CVA, Morbid obesity, DM2, Afib on coumadin. She was brought to the ER for abdominal pain associated with N/V and SOB. Found to have a small bowel obstruction in the ED, now s/p ex-lap with lysis of adhesions 05/10/2022. Urology has been consulted for CT a/p WO  demonstrating a 3.6cm LLP lesion. recc  -Outpatient monitoring of renal mass  -Given her medical complexity I do not feel that further imaging is necessary at the current time. This can be coordinated with dialysis in the future to obtain a CT scan abdomen with and without contrast as a renal protocol.  -Urology will sign out, call with any questions    The patient had a chance to ask questions which were answered. she understands the above plan. Subjective:   Ethan Bae is a 80 y.o. female. She was seen and examined this morning. Today we discussed follow up in office for renal mass.       Objective:   Vitals:  Vitals:    22 0839   BP:    Pulse:    Resp: 16   Temp:    SpO2: 100%       Intake/Output Summary (Last 24 hours) at 2022 1041  Last data filed at 2022 4793  Gross per 24 hour   Intake --   Output 1600 ml   Net -1600 ml     Physical Exam:  Gen: Alert and oriented x3, no acute distress  CV: Regular rate   Resp: unlabored respirations  Abd: Soft, non-distended, non-tender, no masses  Ext: no peripheral edema noted, moves upper and lower extremities spontaneously  Skin: warmand well perfused, no rashes noted on the face, or arms.      Labs:  Lab Results   Component Value Date    WBC 11.3 (H) 05/18/2022    HGB 7.4 (L) 05/18/2022    HCT 23.5 (L) 05/18/2022    MCV 93.3 05/18/2022     05/18/2022     Lab Results   Component Value Date    CREATININE 4.5 (H) 05/18/2022    BUN 70 (H) 05/18/2022     (L) 05/18/2022    K 4.2 05/18/2022    CL 92 (L) 05/18/2022    CO2 27 05/18/2022       Jyotsna Lombardo, ITALO - VICENTA   5/18/2022

## 2022-05-18 NOTE — PROGRESS NOTES
Clinical Pharmacy Note    Pharmacy consulted by Dr. Antonia Garza to manage TPN    Current TPN rate: 83ml/hr  Goal TPN rate: 83ml/hr    Labs:  General Labs:  BMP:    Lab Results   Component Value Date     05/18/2022    K 4.2 05/18/2022    K 4.7 05/06/2022    CL 92 05/18/2022    CO2 27 05/18/2022    BUN 70 05/18/2022    LABALBU 3.3 05/12/2022    CREATININE 4.5 05/18/2022    CALCIUM 9.4 05/18/2022    GFRAA 12 05/18/2022    LABGLOM 10 05/18/2022    GLUCOSE 210 05/18/2022     Blood sugars: 196-234    Electrolyte replacement as follows: No electrolyte replacement has been ordered at this time    Blood sugar management:  Plan to continue high dose humalog sliding scale and increase lantus to 35 units BID     Plan to continue TPN at goal of 83 ml/hr. Thank you for allowing pharmacy to participate in the care of this patient.     Bebo Soria, [address] [place] [address], PharmD 5/18/2022

## 2022-05-18 NOTE — PROGRESS NOTES
Physician Progress Note      PATIENT:               Abdulkadir Valerio  CSN #:                  [id number]  :                       1950  ADMIT DATE:       2022 4:03 AM  DISCH DATE:  RESPONDING  PROVIDER #:        María Jaeger PA-C          QUERY TEXT:    Pt admitted with SBO. Noted documentation of Postoperative ileus on    General Surgery progress note by General Surgery consultant. If possible,   please document in progress notes and discharge summary:    The medical record reflects the following:  Risk Factors: SBO, previous abdominal surgery  Clinical Indicators:   Op note documented  patient had a high-grade ileal   small bowel obstruction. This was present and secondary to  adhesions and scarring into an old lower abdominal surgical site for    and prior hernia repair. The patient had a previous PTFE mesh  and spiral ProTacks in the area and the bowel was adhesed up in the peritoneal   tissue and the mesh in the region with the entrapped bowel  twisted and obstructed.  General Surgery progress note documented   Postoperative ileus. Treatment: Exploratory laparotomy with lysis of adhesions for small bowel   obstruction. Options provided:  -- Postoperative ileus confirmed present on admission  -- Postoperative ileus ruled out  -- Other - I will add my own diagnosis  -- Disagree - Not applicable / Not valid  -- Disagree - Clinically unable to determine / Unknown  -- Refer to Clinical Documentation Reviewer    PROVIDER RESPONSE TEXT:    The diagnosis of Postoperative ileus was confirmed as present on admission.     Query created by: Michelle Smith on 2022 7:02 PM      Electronically signed by:  María Jaeger PA-C 2022 1:23 PM

## 2022-05-18 NOTE — PROGRESS NOTES
[facility]   Nephrology progress Note  [phone]   [facility]. [employer]           SUMMARY :  We are following this patient for ESRD on maintenance hemodialysis   67-year-old female with past medical history of ESRD on maintenance hemodialysis, CVA, morbid obesity, type 2 diabetes, pulmonary hypertension, atrial fibrillation, was sent to from the [address] [facility] home with shortness of breath    SUBJECTIVE:   Patient progress reviewed. The patient says she has abdominal pain  . NG suction ongoing  On TPN    Physical Exam:    VITALS:  BP 98/62   Pulse 88   Temp 98.7 °F (37.1 °C) (Oral)   Resp 16   Ht 5' 4\" (1.626 m)   Wt 259 lb 9.6 oz (117.8 kg)   SpO2 100%   BMI 44.56 kg/m²   BLOOD PRESSURE RANGE:  Systolic (45XMF), RSA:995 , Min:98 , ZBN:521   ; Diastolic (28VDG), JNV:23, Min:62, Max:74    24HR INTAKE/OUTPUT:      Intake/Output Summary (Last 24 hours) at 5/18/2022 1139  Last data filed at 5/18/2022 0622  Gross per 24 hour   Intake --   Output 1600 ml   Net -1600 ml       Gen:  alert, oriented x 3, Obese  PERRL , EOM +  Pallor +, No icterus  Tracheostomy +  JVP not raised   CV: RRR no murmur or rub . Lungs:B/ L air entry, Normal breath sounds   Abd: soft, bowel sounds + , No organomegaly   Ext: No edema, no cyanosis  Skin: Warm.   No rash  Neuro: nonfocal.  Left upper arm AVG , thrill +      DATA:    CBC with Differential:    Lab Results   Component Value Date    WBC 11.3 05/18/2022    RBC 2.52 05/18/2022    HGB 7.4 05/18/2022    HCT 23.5 05/18/2022     05/18/2022    MCV 93.3 05/18/2022    MCH 29.5 05/18/2022    MCHC 31.6 05/18/2022    RDW 19.0 05/18/2022    BANDSPCT 2 05/01/2022    LYMPHOPCT 7.1 05/06/2022    MONOPCT 3.5 05/06/2022    MYELOPCT 1 04/30/2022    BASOPCT 0.9 05/06/2022    MONOSABS 0.4 05/06/2022    LYMPHSABS 0.9 05/06/2022    EOSABS 0.2 05/06/2022    BASOSABS 0.1 05/06/2022     CMP:    Lab Results   Component Value Date     05/18/2022    K 4.2 05/18/2022    K 4.7 05/06/2022    CL 92 05/18/2022    CO2 27 05/18/2022    BUN 70 05/18/2022    CREATININE 4.5 05/18/2022    GFRAA 12 05/18/2022    AGRATIO 1.0 05/12/2022    LABGLOM 10 05/18/2022    GLUCOSE 210 05/18/2022    PROT 6.7 05/12/2022    LABALBU 3.3 05/12/2022    CALCIUM 9.4 05/18/2022    BILITOT 0.6 05/12/2022    ALKPHOS 68 05/12/2022    AST <5 05/12/2022    ALT <5 05/12/2022     Magnesium:    Lab Results   Component Value Date    MG 2.20 05/18/2022     Phosphorus:    Lab Results   Component Value Date    PHOS 4.2 05/16/2022     Uric Acid:    Lab Results   Component Value Date    LABURIC 6.3 04/29/2022     Troponin:    Lab Results   Component Value Date    TROPONINI 0.20 05/06/2022     U/A:  No results found for: NITRITE, COLORU, PROTEINU, PHUR, LABCAST, WBCUA, RBCUA, MUCUS, TRICHOMONAS, YEAST, BACTERIA, CLARITYU, SPECGRAV, LEUKOCYTESUR, UROBILINOGEN, BILIRUBINUR, BLOODU, GLUCOSEU, AMORPHOUS      IMPRESSION/RECOMMENDATIONS:      Diagnosis and Plan     1. ESRD on HD  TTS at [facility]     2. Anemia of CKD/ vaginal Bleeding : target Hb 9-11, on KOKO with HD    3. Renal osteodystrophy : Monitor Ca and phos   4.  Left Renal mass ( CT abdomen and pelvis  5/6/22)   Numerous cysts are identified within the   kidneys bilaterally.  Many of the cysts are simple fluid in density, however   several are also not consistent with simple cysts. Rossi Torres is a 40 Hounsfield   unit lesion in the lower pole the left kidney measuring 3.6 cm in size.       Urology Consulted     5.  5/10/2022, exploratory laparotomy with lysis of adhesions for small bowel obstruction    Crys Desai MD

## 2022-05-18 NOTE — CARE COORDINATION
CM called and spoke to Gloria Sweeney at [address] who informed CM that pre-cert was started yesterday as instructed and remains pending at this time. Gloria Sweeney will update CM when pre-cert results.      Electronically signed by Óscar Mcnamara RN on 5/18/2022 at 1:22 PM

## 2022-05-18 NOTE — CARE COORDINATION
Received call from Roel at [address] [place] [address] to report that pre-cert to [facility] had been denied for the reason that they feel she will not need 21 days at an LTAC level of care. CM discussed with Natalee Mcfadden) and decided to move forward with appeal, focusing on NGT and nutrition which cannot be managed at [facility]. CM called Linda back to initiate appeal process. Roel will update this CM once next step is defined. Michelle Odom CNP notified via PS.      Electronically signed by Chano Lam RN on 5/18/2022 at 4:24 PM

## 2022-05-18 NOTE — PROGRESS NOTES
Awake, restless, c/o right upper chest pain, no nausea, no SOB. Gave Morphine per prn order. VSS. Assessment completed, see flow charts. No distress noted. imani

## 2022-05-18 NOTE — PROGRESS NOTES
[facility]        Progress Note    Pt Name: Robbin Garcia  MRN: [id number]  [place]: 1950  Date of evaluation: 2022    Subjective:    No acute events overnight  Incisional pain controlled  Reports nausea this morning without vomiting, NGT cannister changed last night and not connected back to suction, about 500 mL aspirated when returned to suction with resolution of nausea  Passing flatus, no stool overnight  Resting in bed at this time    Postoperative Day #8      Vital Signs:  Patient Vitals for the past 24 hrs:   BP Temp Temp src Pulse Resp SpO2 Weight   22 1143 -- -- -- -- 16 100 % --   22 1130 98/62 98.7 °F (37.1 °C) Oral 88 16 100 % --   22 0839 -- -- -- -- 16 100 % --   22 0730 115/70 98.8 °F (37.1 °C) Oral 94 16 100 % --   22 0403 -- -- -- -- 16 100 % --   22 0325 105/65 98.7 °F (37.1 °C) Oral 99 16 98 % --   22 0320 -- -- -- -- -- -- 259 lb 9.6 oz (117.8 kg)   22 0045 -- -- -- -- 16 100 % --   22 0000 113/64 98.9 °F (37.2 °C) Oral 85 20 100 % --   22 -- -- -- -- 16 100 % --   22 111/74 98.2 °F (36.8 °C) Oral 90 19 100 % --   22 1645 107/65 99.1 °F (37.3 °C) Oral 95 18 100 % --   22 1330 107/69 98 °F (36.7 °C) Axillary 103 19 100 % --      TEMPERATURE HISTORY 24H: Temp (24hrs), Av.6 °F (37 °C), Min:98 °F (36.7 °C), Max:99.1 °F (37.3 °C)    BLOOD PRESSURE HISTORY: Systolic (15JMA), EJY:123 , Min:98 , KBP:852    Diastolic (86GRZ), APY:77, Min:52, Max:74      Intake/Output:    I/O last 3 completed shifts: In: .8   Out:  [Emesis/NG output:]  No intake/output data recorded.   Drain/tube Output:         Physical Exam:  General: awake, alert, trach collar, interacts appropriately  Pulmonary: unlabored respirations  Abdomen: soft, mild distension verses obesity, appropriate incisional tenderness only, bowel sounds present  Skin/Wound: PREM dressing in place, seal intact, pump off--needs battery (nurse aware and will change)    Labs:  CBC:    Recent Labs     05/16/22  0530 05/17/22  0545 05/18/22  0610   WBC 11.9* 10.9 11.3*   HGB 7.6* 7.5* 7.4*   HCT 24.5* 24.0* 23.5*    244 241     BMP:    Recent Labs     05/16/22  0530 05/17/22  0545 05/18/22  0610   * 131* 133*   K 4.3 4.2 4.2   CL 91* 93* 92*   CO2 25 23 27   BUN 71* 94* 70*   CREATININE 5.4* 5.8* 4.5*   GLUCOSE 302* 224* 210*     Hepatic:   No results for input(s): AST, ALT, ALB, BILITOT, ALKPHOS in the last 72 hours. Amylase: No results for input(s): AMYLASE in the last 72 hours. Lipase: No results for input(s): LIPASE in the last 72 hours. Mag:      Recent Labs     05/16/22  0530 05/17/22  0545 05/18/22  0610   MG 2.20 2.20 2.20      Phos:     Recent Labs     05/16/22  0530 05/18/22  0610   PHOS 4.2 4.3      Coags: No results for input(s): INR, APTT in the last 72 hours. Cultures:  Relevant cultures documented under results section     Pathology:  No relevant pathology    Imaging:  I have personally reviewed the following films:    XR CHEST PORTABLE    Result Date: 5/16/2022  EXAMINATION: ONE XRAY VIEW OF THE CHEST 5/16/2022 3:00 pm COMPARISON: May 10, 2022 HISTORY: ORDERING SYSTEM PROVIDED HISTORY: post op TECHNOLOGIST PROVIDED HISTORY: Reason for exam:->post op Reason for Exam: Post op FINDINGS: The cardiac silhouette is enlarged. No pleural effusion or pneumothorax. Tracheostomy tube projecting near the mid clavicular heads. Left central venous catheter projects over the SVC. There is a bend in the proximal aspect of the catheter which could be external to the patient. Mild interstitial prominence favoring mild pulmonary vascular congestion. Enteric tube projects below the left hemidiaphragm likely in the proximal to mid stomach. Mild increased pulmonary vascular congestion.      Scheduled Meds:   insulin glargine  30 Units SubCUTAneous BID    albuterol  2.5 mg Nebulization BID    bisacodyl  10 mg Rectal Daily    insulin lispro  0-18 Units SubCUTAneous Q4H    [START ON 5/19/2022] fat emulsion  250 mL IntraVENous Once per day on Mon Thu    epoetin claire-epbx  10,000 Units IntraVENous Once per day on Tue Thu Sat    metoprolol tartrate  25 mg Oral BID    QUEtiapine  12.5 mg Oral Nightly    pantoprazole  20 mg IntraVENous Daily    sodium chloride flush  5-40 mL IntraVENous 2 times per day    sodium chloride flush  5-40 mL IntraVENous 2 times per day    heparin (porcine)  5,000 Units SubCUTAneous TID    heparin (porcine)  1,000 Units IntraVENous Once     Continuous Infusions:   PN-Adult Premix 5/20 - Standard Electrolytes - Central Line 83 mL/hr at 05/17/22 1835    sodium chloride Stopped (05/13/22 1453)    sodium chloride Stopped (05/12/22 1746)    dextrose       PRN Meds:.diphenhydrAMINE-zinc acetate, albuterol, morphine **OR** morphine, metoprolol, LORazepam, sodium chloride, ondansetron **OR** ondansetron, acetaminophen **OR** acetaminophen, sodium chloride flush, sodium chloride, polyethylene glycol, glucose, glucagon (rDNA), dextrose, dextrose bolus **OR** dextrose bolus, diphenhydrAMINE      Assessment:  OR Date 5/10/2022, exploratory laparotomy with lysis of adhesions for small bowel obstruction  Fecal impaction  Chronic respiratory failure on trach/vent  Hypertension  Diabetes  End-stage renal disease on hemodialysis  Paroxysmal atrial fibrillation  History of DVT, on Coumadin  Vaginal bleeding  Morbid obesity, BMI 44.3  Postoperative ileus      Plan:  1. Abdominal exam appropriate, passing flatus, no stool overnight, still having high NGT output, nausea with prolonged clamping, PREM dressing intact--keep in place/change battery PRN; continued supportive care  2. NPO with NGT decompression; monitor bowel function--continue Dulcolax suppository daily  3. IV hydration and electrolyte correction per nephrology  4. Activity as tolerated  5.  PRN analgesics and antiemetics--minimizing narcotics as tolerated  6. DVT prophylaxis with heparin injections; hold oral anticoagulation  7. Management of medical comorbid etiologies per primary team and consulting services  8. Disposition: Okay for discharge to LTAC when arrangements have been made    EDUCATION:  Educated patient on plan of care and disease process--all questions answered. Plans discussed with patient and nursing. Reviewed and discussed with Dr. Sundeep Hearn.       Signed:  ITALO Pastor - CNP  5/18/2022 12:57 PM     Surgery Staff:     Pt seen and examined with NP  See full note above  Continues moderate NG output with ileus post op  Abd exam o/w stable, wound clean  PREM removed, discontinue binder, DSD on wound  Looking at [facility] placement    Makeda Jones MD

## 2022-05-19 ENCOUNTER — APPOINTMENT (OUTPATIENT)
Dept: GENERAL RADIOLOGY | Age: 72
DRG: 335 | End: 2022-05-19
Payer: COMMERCIAL

## 2022-05-19 LAB
A/G RATIO: 0.5 (ref 1.1–2.2)
ALBUMIN SERPL-MCNC: 2.5 G/DL (ref 3.4–5)
ALP BLD-CCNC: 118 U/L (ref 40–129)
ALT SERPL-CCNC: 6 U/L (ref 10–40)
ANION GAP SERPL CALCULATED.3IONS-SCNC: 17 MMOL/L (ref 3–16)
ANISOCYTOSIS: ABNORMAL
AST SERPL-CCNC: 8 U/L (ref 15–37)
BANDED NEUTROPHILS RELATIVE PERCENT: 55 % (ref 0–7)
BASOPHILS ABSOLUTE: 0 K/UL (ref 0–0.2)
BASOPHILS RELATIVE PERCENT: 0 %
BILIRUB SERPL-MCNC: 0.7 MG/DL (ref 0–1)
BUN BLDV-MCNC: 98 MG/DL (ref 7–20)
CALCIUM SERPL-MCNC: 9.3 MG/DL (ref 8.3–10.6)
CHLORIDE BLD-SCNC: 90 MMOL/L (ref 99–110)
CO2: 24 MMOL/L (ref 21–32)
CREAT SERPL-MCNC: 5.6 MG/DL (ref 0.6–1.2)
EOSINOPHILS ABSOLUTE: 0.6 K/UL (ref 0–0.6)
EOSINOPHILS RELATIVE PERCENT: 5 %
GFR AFRICAN AMERICAN: 9
GFR NON-AFRICAN AMERICAN: 7
GLUCOSE BLD-MCNC: 179 MG/DL (ref 70–99)
GLUCOSE BLD-MCNC: 194 MG/DL (ref 70–99)
GLUCOSE BLD-MCNC: 196 MG/DL (ref 70–99)
GLUCOSE BLD-MCNC: 200 MG/DL (ref 70–99)
GLUCOSE BLD-MCNC: 212 MG/DL (ref 70–99)
GLUCOSE BLD-MCNC: 220 MG/DL (ref 70–99)
GLUCOSE BLD-MCNC: 221 MG/DL (ref 70–99)
GLUCOSE BLD-MCNC: 238 MG/DL (ref 70–99)
HCT VFR BLD CALC: 24.8 % (ref 36–48)
HEMATOLOGY PATH CONSULT: YES
HEMOGLOBIN: 7.7 G/DL (ref 12–16)
LYMPHOCYTES ABSOLUTE: 0.8 K/UL (ref 1–5.1)
LYMPHOCYTES RELATIVE PERCENT: 6 %
MAGNESIUM: 2.4 MG/DL (ref 1.8–2.4)
MCH RBC QN AUTO: 29 PG (ref 26–34)
MCHC RBC AUTO-ENTMCNC: 30.9 G/DL (ref 31–36)
MCV RBC AUTO: 94.1 FL (ref 80–100)
METAMYELOCYTES RELATIVE PERCENT: 7 %
MONOCYTES ABSOLUTE: 0.4 K/UL (ref 0–1.3)
MONOCYTES RELATIVE PERCENT: 3 %
MONONUCLEAR UNIDENTIFIED CELLS: 1 %
MYELOCYTE PERCENT: 1 %
NEUTROPHILS ABSOLUTE: 10.7 K/UL (ref 1.7–7.7)
NEUTROPHILS RELATIVE PERCENT: 22 %
NUCLEATED RED BLOOD CELLS: 1 /100 WBC
PDW BLD-RTO: 19 % (ref 12.4–15.4)
PERFORMED ON: ABNORMAL
PHOSPHORUS: 5.6 MG/DL (ref 2.5–4.9)
PLATELET # BLD: 280 K/UL (ref 135–450)
PMV BLD AUTO: 8.7 FL (ref 5–10.5)
POLYCHROMASIA: ABNORMAL
POTASSIUM SERPL-SCNC: 4.6 MMOL/L (ref 3.5–5.1)
RBC # BLD: 2.64 M/UL (ref 4–5.2)
SODIUM BLD-SCNC: 131 MMOL/L (ref 136–145)
TOTAL PROTEIN: 7.7 G/DL (ref 6.4–8.2)
WBC # BLD: 12.6 K/UL (ref 4–11)

## 2022-05-19 PROCEDURE — 6360000002 HC RX W HCPCS: Performed by: SURGERY

## 2022-05-19 PROCEDURE — 6370000000 HC RX 637 (ALT 250 FOR IP): Performed by: NURSE PRACTITIONER

## 2022-05-19 PROCEDURE — APPNB30 APP NON BILLABLE TIME 0-30 MINS: Performed by: NURSE PRACTITIONER

## 2022-05-19 PROCEDURE — 71045 X-RAY EXAM CHEST 1 VIEW: CPT

## 2022-05-19 PROCEDURE — 2500000003 HC RX 250 WO HCPCS: Performed by: SURGERY

## 2022-05-19 PROCEDURE — C9113 INJ PANTOPRAZOLE SODIUM, VIA: HCPCS | Performed by: SURGERY

## 2022-05-19 PROCEDURE — APPSS15 APP SPLIT SHARED TIME 0-15 MINUTES: Performed by: NURSE PRACTITIONER

## 2022-05-19 PROCEDURE — 36415 COLL VENOUS BLD VENIPUNCTURE: CPT

## 2022-05-19 PROCEDURE — 2500000003 HC RX 250 WO HCPCS: Performed by: INTERNAL MEDICINE

## 2022-05-19 PROCEDURE — 2060000000 HC ICU INTERMEDIATE R&B

## 2022-05-19 PROCEDURE — 6370000000 HC RX 637 (ALT 250 FOR IP): Performed by: INTERNAL MEDICINE

## 2022-05-19 PROCEDURE — 6370000000 HC RX 637 (ALT 250 FOR IP): Performed by: SURGERY

## 2022-05-19 PROCEDURE — 83735 ASSAY OF MAGNESIUM: CPT

## 2022-05-19 PROCEDURE — 99024 POSTOP FOLLOW-UP VISIT: CPT | Performed by: SURGERY

## 2022-05-19 PROCEDURE — 85025 COMPLETE CBC W/AUTO DIFF WBC: CPT

## 2022-05-19 PROCEDURE — 94640 AIRWAY INHALATION TREATMENT: CPT

## 2022-05-19 PROCEDURE — 2580000003 HC RX 258: Performed by: SURGERY

## 2022-05-19 PROCEDURE — 84100 ASSAY OF PHOSPHORUS: CPT

## 2022-05-19 PROCEDURE — 87040 BLOOD CULTURE FOR BACTERIA: CPT

## 2022-05-19 PROCEDURE — 80053 COMPREHEN METABOLIC PANEL: CPT

## 2022-05-19 PROCEDURE — 2700000000 HC OXYGEN THERAPY PER DAY

## 2022-05-19 PROCEDURE — 94761 N-INVAS EAR/PLS OXIMETRY MLT: CPT

## 2022-05-19 PROCEDURE — 6360000002 HC RX W HCPCS: Performed by: NURSE PRACTITIONER

## 2022-05-19 RX ORDER — MORPHINE SULFATE 2 MG/ML
2 INJECTION, SOLUTION INTRAMUSCULAR; INTRAVENOUS
Status: DISCONTINUED | OUTPATIENT
Start: 2022-05-19 | End: 2022-05-19

## 2022-05-19 RX ORDER — METOCLOPRAMIDE HYDROCHLORIDE 5 MG/ML
5 INJECTION INTRAMUSCULAR; INTRAVENOUS EVERY 6 HOURS
Status: COMPLETED | OUTPATIENT
Start: 2022-05-19 | End: 2022-05-20

## 2022-05-19 RX ORDER — MORPHINE SULFATE 2 MG/ML
2 INJECTION, SOLUTION INTRAMUSCULAR; INTRAVENOUS EVERY 4 HOURS PRN
Status: DISCONTINUED | OUTPATIENT
Start: 2022-05-19 | End: 2022-05-21 | Stop reason: HOSPADM

## 2022-05-19 RX ADMIN — INSULIN LISPRO 3 UNITS: 100 INJECTION, SOLUTION INTRAVENOUS; SUBCUTANEOUS at 22:38

## 2022-05-19 RX ADMIN — DIPHENHYDRAMINE HYDROCHLORIDE 25 MG: 50 INJECTION, SOLUTION INTRAMUSCULAR; INTRAVENOUS at 01:47

## 2022-05-19 RX ADMIN — HEPARIN SODIUM 5000 UNITS: 5000 INJECTION INTRAVENOUS; SUBCUTANEOUS at 16:07

## 2022-05-19 RX ADMIN — INSULIN LISPRO 3 UNITS: 100 INJECTION, SOLUTION INTRAVENOUS; SUBCUTANEOUS at 08:51

## 2022-05-19 RX ADMIN — METOCLOPRAMIDE 5 MG: 5 INJECTION, SOLUTION INTRAMUSCULAR; INTRAVENOUS at 18:06

## 2022-05-19 RX ADMIN — Medication 10 ML: at 10:21

## 2022-05-19 RX ADMIN — Medication 10 ML: at 22:47

## 2022-05-19 RX ADMIN — HEPARIN SODIUM 5000 UNITS: 5000 INJECTION INTRAVENOUS; SUBCUTANEOUS at 10:17

## 2022-05-19 RX ADMIN — INSULIN GLARGINE 35 UNITS: 100 INJECTION, SOLUTION SUBCUTANEOUS at 08:51

## 2022-05-19 RX ADMIN — QUETIAPINE FUMARATE 12.5 MG: 25 TABLET ORAL at 22:47

## 2022-05-19 RX ADMIN — ALBUTEROL SULFATE 2.5 MG: 2.5 SOLUTION RESPIRATORY (INHALATION) at 21:38

## 2022-05-19 RX ADMIN — METOPROLOL TARTRATE 25 MG: 25 TABLET, FILM COATED ORAL at 10:16

## 2022-05-19 RX ADMIN — MORPHINE SULFATE 2 MG: 2 INJECTION, SOLUTION INTRAMUSCULAR; INTRAVENOUS at 11:04

## 2022-05-19 RX ADMIN — PANTOPRAZOLE SODIUM 20 MG: 40 INJECTION, POWDER, FOR SOLUTION INTRAVENOUS at 10:16

## 2022-05-19 RX ADMIN — ALBUTEROL SULFATE 2.5 MG: 2.5 SOLUTION RESPIRATORY (INHALATION) at 09:01

## 2022-05-19 RX ADMIN — INSULIN LISPRO 6 UNITS: 100 INJECTION, SOLUTION INTRAVENOUS; SUBCUTANEOUS at 05:24

## 2022-05-19 RX ADMIN — Medication 10 ML: at 10:20

## 2022-05-19 RX ADMIN — MORPHINE SULFATE 2 MG: 2 INJECTION, SOLUTION INTRAMUSCULAR; INTRAVENOUS at 19:55

## 2022-05-19 RX ADMIN — METOPROLOL TARTRATE 25 MG: 25 TABLET, FILM COATED ORAL at 22:53

## 2022-05-19 RX ADMIN — INSULIN GLARGINE 35 UNITS: 100 INJECTION, SOLUTION SUBCUTANEOUS at 22:37

## 2022-05-19 RX ADMIN — HEPARIN SODIUM 5000 UNITS: 5000 INJECTION INTRAVENOUS; SUBCUTANEOUS at 22:45

## 2022-05-19 RX ADMIN — LEUCINE, PHENYLALANINE, LYSINE, METHIONINE, ISOLEUCINE, VALINE, HISTIDINE, THREONINE, TRYPTOPHAN, ALANINE, GLYCINE, ARGININE, PROLINE, SERINE, TYROSINE, SODIUM ACETATE, DIBASIC POTASSIUM PHOSPHATE, MAGNESIUM CHLORIDE, SODIUM CHLORIDE, CALCIUM CHLORIDE, DEXTROSE
365; 280; 290; 200; 300; 290; 240; 210; 90; 1035; 515; 575; 340; 250; 20; 340; 261; 51; 59; 33; 20 INJECTION INTRAVENOUS at 18:18

## 2022-05-19 RX ADMIN — INSULIN LISPRO 6 UNITS: 100 INJECTION, SOLUTION INTRAVENOUS; SUBCUTANEOUS at 11:50

## 2022-05-19 RX ADMIN — I.V. FAT EMULSION 250 ML: 20 EMULSION INTRAVENOUS at 18:10

## 2022-05-19 RX ADMIN — INSULIN LISPRO 6 UNITS: 100 INJECTION, SOLUTION INTRAVENOUS; SUBCUTANEOUS at 01:08

## 2022-05-19 RX ADMIN — DIPHENHYDRAMINE HYDROCHLORIDE 25 MG: 50 INJECTION, SOLUTION INTRAMUSCULAR; INTRAVENOUS at 13:42

## 2022-05-19 RX ADMIN — INSULIN LISPRO 3 UNITS: 100 INJECTION, SOLUTION INTRAVENOUS; SUBCUTANEOUS at 16:07

## 2022-05-19 ASSESSMENT — PAIN DESCRIPTION - LOCATION: LOCATION: LEG

## 2022-05-19 ASSESSMENT — PAIN SCALES - GENERAL
PAINLEVEL_OUTOF10: 10
PAINLEVEL_OUTOF10: 10

## 2022-05-19 ASSESSMENT — PAIN DESCRIPTION - ORIENTATION: ORIENTATION: LEFT

## 2022-05-19 NOTE — PROGRESS NOTES
Patient refused to go to dialysis. Educated patient on the necessity of going and patient continued to refuse.

## 2022-05-19 NOTE — PROGRESS NOTES
(porcine) injection 5,000 Units, TID  polyethylene glycol (GLYCOLAX) packet 17 g, Daily PRN  glucose (GLUTOSE) 40 % oral gel 15 g, PRN  glucagon (rDNA) injection 1 mg, PRN  dextrose 5 % solution, PRN  dextrose bolus 10% 125 mL, PRN   Or  dextrose bolus 10% 250 mL, PRN  heparin (porcine) injection 1,000 Units, Once  diphenhydrAMINE (BENADRYL) injection 25 mg, Q6H PRN        Objective:  /70   Pulse 91   Temp 97.7 °F (36.5 °C) (Axillary)   Resp 18   Ht 5' 4\" (1.626 m)   Wt 268 lb 3.2 oz (121.7 kg)   SpO2 100%   BMI 46.04 kg/m²     Intake/Output Summary (Last 24 hours) at 5/19/2022 1021  Last data filed at 5/18/2022 2352  Gross per 24 hour   Intake 2410.91 ml   Output 1200 ml   Net 1210.91 ml      Wt Readings from Last 3 Encounters:   05/19/22 268 lb 3.2 oz (121.7 kg)   05/01/22 (!) 309 lb 8 oz (140.4 kg)   04/26/22 291 lb (132 kg)       General appearance:  Appears chronically ill and obese. Awake, she is alert and oriented but wants to rest, morbidly obese  Eyes: Sclera clear. Pupils equal.  ENT: Moist oral mucosa. Trachea midline, no adenopathy. NG in place  Cardiovascular: Regular rhythm, normal S1, S2. No murmur. No edema in lower extremities  Respiratory: Not using accessory muscles. trach collar in place with 8 liters of oxygen, no ronchi or wheezing. Decreased in bases   GI: Abdomen softly distended and obese with rare bowel sounds. NG to suction with green drainage. abd dressing is clean dry intact.   abd binder is in place    Musculoskeletal: No cyanosis in digits, neck supple  Neurology: moving arms and her head, sedation is being weaned   Psych: sedated   Skin: Warm, dry, poor  Turgor, scratch marks notes on chest and arms but appear to be healing     Labs and Tests:  CBC:   Recent Labs     05/17/22  0545 05/18/22  0610 05/19/22  0827   WBC 10.9 11.3* 12.6*   HGB 7.5* 7.4* 7.7*    241 280     BMP:    Recent Labs     05/17/22  0545 05/18/22  0610 05/19/22  0826   * 133* 131*   K 4.2 4.2 4.6   CL 93* 92* 90*   CO2 23 27 24   BUN 94* 70* 98*   CREATININE 5.8* 4.5* 5.6*   GLUCOSE 224* 210* 200*     Hepatic:   Recent Labs     05/19/22  0826   AST 8*   ALT 6*   BILITOT 0.7   ALKPHOS 118         Problem List  Principal Problem:    SBO (small bowel obstruction) ([facility])  Active Problems:    Fecal impaction (HCC)    ESRD on dialysis (HCC)    Chronic respiratory failure requiring continuous mechanical ventilation through tracheostomy ([facility] [address]    History of CVA (cerebrovascular accident)    Anemia, chronic disease    Chronic diastolic heart failure (HCC)    Essential hypertension    GERD (gastroesophageal reflux disease)    Morbid obesity with BMI of 50.0-59.9, adult ([facility])    SILVER (obstructive sleep apnea)  Resolved Problems:    * No resolved hospital problems. *       Assessment & Plan:   1. SBO: POD # 7 from Exp Lap with IVC: GS following. Still has NG tube. 2. PAF-  On BB, rate in the 80 - 90   3. ESRD:  On HD q [facility], Saturday,  Nephrology is following. Refused dialysis today. 4. Hx of respiratory failure:  S/p trach > 1 year ago. She has been extubated, currently on trach collar, humidified oxygen. She refuses to use IS. 5. T2DM:  Current hyperglycemia likely due to TPN given AIC of 5.4  Will increase lantus to 35 units BID   6. Hx of DVT: coumadin on hold ,on SC heparin  7. Malnutrition: TPN started 5/11  8. Bilateral renal cysts per CT scan: CT with and without contrast of kidney per renal mass protocol outpatient to exclude neoplasm   9. Acute on chronic anemia:  on retacrit , Hgb 7.4, stable,   She has had 2 units post operatively    10.  Disposition-possible transfer to LTAC give prolonged ileus      Diet: Diet NPO Exceptions are: Ice Chips, Sips of Water with Meds, Sips of Clear Liquids, Popsicles  PN-Adult Premix 5/20 - Standard Electrolytes - Central Line  Code:Full Code  DVT PPX    Abner Kraus PA-C   5/18/2022 10:21 AM

## 2022-05-19 NOTE — PROGRESS NOTES
[facility]        Progress Note    Pt Name: Ivan Walter  MRN: [id number]  [place]: 1950  Date of evaluation: 2022    Subjective:    No acute events overnight  Incisional pain well controlled  No further nausea, no vomiting, NGT remains in place  Passing flatus and stool  Resting in bed at this time    Postoperative Day #9      Vital Signs:  Patient Vitals for the past 24 hrs:   BP Temp Temp src Pulse Resp SpO2 Weight   22 0904 -- -- -- -- 18 100 % --   22 0800 -- -- -- -- -- -- 268 lb 3.2 oz (121.7 kg)   22 0715 109/70 97.7 °F (36.5 °C) Axillary 91 17 100 % --   22 0339 -- -- -- -- -- 100 % --   22 0230 115/72 -- -- 88 17 100 % --   22 2351 112/74 97.5 °F (36.4 °C) Axillary 81 18 100 % --   228 -- -- -- -- 18 100 % --   22 -- -- -- -- 18 100 % --   22 111/68 -- -- 89 -- -- --   22 -- -- -- -- 18 100 % --   22 1925 (!) 94/57 98.7 °F (37.1 °C) Oral 82 17 100 % --   22 1300 (!) 97/59 98.3 °F (36.8 °C) Oral 90 18 100 % --      TEMPERATURE HISTORY 24H: Temp (24hrs), Av.1 °F (36.7 °C), Min:97.5 °F (36.4 °C), Max:98.7 °F (37.1 °C)    BLOOD PRESSURE HISTORY: Systolic (46EJY), UQ , Min:94 , EGR:191    Diastolic (92FNB), VGM:43, Min:57, Max:74      Intake/Output:    I/O last 3 completed shifts: In: 2410.9   Out: 2800 [Emesis/NG output:2800]  No intake/output data recorded.   Drain/tube Output:         Physical Exam:  General: awake, alert, trach collar, interacts appropriately  Pulmonary: unlabored respirations  Abdomen: soft, mild distension verses obesity, appropriate incisional tenderness only, bowel sounds present/hypoactive  Skin/Wound: healing well, no drainage, no erythema, well approximated with staples--dressing changed    Labs:  CBC:    Recent Labs     22  0545 22  0610 22  0827   WBC 10.9 11.3* 12.6*   HGB 7.5* 7.4* 7.7*   HCT 24.0* 23.5* 24.8*    241 280     BMP:    Recent Labs     05/17/22  0545 05/18/22  0610 05/19/22  0826   * 133* 131*   K 4.2 4.2 4.6   CL 93* 92* 90*   CO2 23 27 24   BUN 94* 70* 98*   CREATININE 5.8* 4.5* 5.6*   GLUCOSE 224* 210* 200*     Hepatic:   Recent Labs     05/19/22  0826   AST 8*   ALT 6*   BILITOT 0.7   ALKPHOS 118     Amylase: No results for input(s): AMYLASE in the last 72 hours. Lipase: No results for input(s): LIPASE in the last 72 hours. Mag:      Recent Labs     05/17/22  0545 05/18/22  0610 05/19/22  0826   MG 2.20 2.20 2.40      Phos:     Recent Labs     05/18/22  0610 05/19/22  0826   PHOS 4.3 5.6*      Coags: No results for input(s): INR, APTT in the last 72 hours. Cultures:  Relevant cultures documented under results section     Pathology:  No relevant pathology    Imaging:  I have personally reviewed the following films:    XR CHEST PORTABLE    Result Date: 5/19/2022  EXAMINATION: ONE XRAY VIEW OF THE CHEST 5/19/2022 10:25 am COMPARISON: 05/16/2022. HISTORY: ORDERING SYSTEM PROVIDED HISTORY: leukocytosis TECHNOLOGIST PROVIDED HISTORY: Reason for exam:->leukocytosis Reason for Exam: Leukocytosis FINDINGS: The cardiac silhouette is enlarged and stable. Lung volumes are mildly decreased with resultant vascular crowding towards the lung bases. No acute infiltrate, significant pleural fluid or evidence of overt failure. Tracheostomy appliance, enteric catheter extending into the proximal gastric body and left IJ catheter remain in place. Mild decreased lung volumes. No acute cardiopulmonary disease.      Scheduled Meds:   insulin glargine  35 Units SubCUTAneous BID    albuterol  2.5 mg Nebulization BID    bisacodyl  10 mg Rectal Daily    insulin lispro  0-18 Units SubCUTAneous Q4H    fat emulsion  250 mL IntraVENous Once per day on Mon Thu    epoetin claire-epbx  10,000 Units IntraVENous Once per day on Tue Thu Sat    metoprolol tartrate  25 mg Oral BID    QUEtiapine  12.5 mg Oral Nightly    pantoprazole  20 mg IntraVENous Daily    sodium chloride flush  5-40 mL IntraVENous 2 times per day    sodium chloride flush  5-40 mL IntraVENous 2 times per day    heparin (porcine)  5,000 Units SubCUTAneous TID    heparin (porcine)  1,000 Units IntraVENous Once     Continuous Infusions:   PN-Adult Premix 5/20 - Standard Electrolytes - Central Line 83 mL/hr at 05/18/22 2352    sodium chloride Stopped (05/13/22 1453)    sodium chloride Stopped (05/12/22 1746)    dextrose       PRN Meds:.diphenhydrAMINE-zinc acetate, albuterol, morphine **OR** morphine, metoprolol, LORazepam, sodium chloride, ondansetron **OR** ondansetron, acetaminophen **OR** acetaminophen, sodium chloride flush, sodium chloride, polyethylene glycol, glucose, glucagon (rDNA), dextrose, dextrose bolus **OR** dextrose bolus, diphenhydrAMINE      Assessment:  OR Date 5/10/2022, exploratory laparotomy with lysis of adhesions for small bowel obstruction  Fecal impaction  Chronic respiratory failure on trach/vent  Hypertension  Diabetes  End-stage renal disease on hemodialysis  Paroxysmal atrial fibrillation  History of DVT, on Coumadin  Vaginal bleeding  Morbid obesity, BMI 44.3  Postoperative ileus      Plan:  1. Abdominal exam appropriate, passing flatus and stool, no further nausea with NGT to suction--output remains high; continued supportive care  2. NGT trail clamping--if output 200 mL or less after 4 hours and no nausea/vomiting, remove NGT but continue NPO diet; monitor bowel function--continue Dulcolax suppository daily  3. IV hydration and electrolyte correction per nephrology  4. Activity as tolerated  5. PRN analgesics and antiemetics--minimizing narcotics as tolerated  6. DVT prophylaxis with heparin injections; hold oral anticoagulation  7. Management of medical comorbid etiologies per primary team and consulting services  8.  Disposition: Okay for discharge to [place] [address] when arrangements have been made    EDUCATION:  Educated patient on plan of care and disease process--all questions answered. Plans discussed with patient and nursing. Reviewed and discussed with Dr. Jesusita Laurent.       Signed:  ITALO Dumont CNP  5/19/2022 11:43 AM     Surgery Staff:     Pt seen and examined with NP  See full note above  Alert and in good spirits today  Abd exam stable, has had BM, incision clean  Will try clamping and remove NG today if all OK  Keep NPO    Quoc Mcdonald MD

## 2022-05-19 NOTE — PROGRESS NOTES
[address]  Patient has size 6 XLT Trach Kit of same size on standby  Yes, Obturator at head of the bed  Yes. Inner cannula cleaned/replaced Yes, drain sponge changed  Yes, Trach tie replaced is soiled No, Flange cleaned  Yes. [address] performed without complications.

## 2022-05-19 NOTE — PROGRESS NOTES
NG was clamped. At 1415 hooked up to cont. Low suction with <200 (180) output. Pulled NG per nursing communication.

## 2022-05-19 NOTE — PLAN OF CARE
Problem: Discharge Planning  Goal: Discharge to home or other facility with appropriate resources  Outcome: Progressing     Problem: Pain  Goal: Verbalizes/displays adequate comfort level or baseline comfort level  Outcome: Progressing     Problem: Skin/Tissue Integrity  Goal: Absence of new skin breakdown  Description: 1. Monitor for areas of redness and/or skin breakdown  2. Assess vascular access sites hourly  3. Every 4-6 hours minimum:  Change oxygen saturation probe site  4. Every 4-6 hours:  If on nasal continuous positive airway pressure, respiratory therapy assess nares and determine need for appliance change or resting period. 5/19/2022 1143 by Nii Pozo RN  Outcome: Progressing  5/19/2022 0616 by Cezar Yanez RN  Note: Complete linen change, pt placed on lift pad to better turn/reposition pt in bed. PT bathed using bath wipes, gown changed. NG dressing replaced, face cleansed. PT encouraged to assist in turning, shift weight in bed, allow for extra pillow support.        Problem: Safety - Adult  Goal: Free from fall injury  Outcome: Progressing     Problem: ABCDS Injury Assessment  Goal: Absence of physical injury  Outcome: Progressing     Problem: Neurosensory - Adult  Goal: Achieves maximal functionality and self care  Outcome: Progressing     Problem: Respiratory - Adult  Goal: Achieves optimal ventilation and oxygenation  Outcome: Progressing     Problem: Cardiovascular - Adult  Goal: Maintains optimal cardiac output and hemodynamic stability  Outcome: Progressing  Goal: Absence of cardiac dysrhythmias or at baseline  Outcome: Progressing     Problem: Skin/Tissue Integrity - Adult  Goal: Skin integrity remains intact  Outcome: Progressing     Problem: Musculoskeletal - Adult  Goal: Return mobility to safest level of function  Outcome: Progressing     Problem: Gastrointestinal - Adult  Goal: Minimal or absence of nausea and vomiting  Outcome: Progressing  Goal: Maintains or returns to baseline bowel function  Outcome: Progressing  Goal: Maintains adequate nutritional intake  Outcome: Progressing     Problem: Genitourinary - Adult  Goal: Urinary catheter remains patent  Outcome: Progressing     Problem: Infection - Adult  Goal: Absence of infection at discharge  Outcome: Progressing     Problem: Metabolic/Fluid and Electrolytes - Adult  Goal: Electrolytes maintained within normal limits  Outcome: Progressing  Goal: Hemodynamic stability and optimal renal function maintained  Outcome: Progressing  Goal: Glucose maintained within prescribed range  Outcome: Progressing     Problem: Hematologic - Adult  Goal: Maintains hematologic stability  Outcome: Progressing     Problem: Nutrition Deficit:  Goal: Optimize nutritional status  Outcome: Progressing     Problem: Chronic Conditions and Co-morbidities  Goal: Patient's chronic conditions and co-morbidity symptoms are monitored and maintained or improved  Outcome: Progressing
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
## 2022-05-19 NOTE — PROGRESS NOTES
[facility] PROGRESS NOTE    5/19/2022 10:17 AM        Name: Naif Suarez . Admitted: 5/6/2022  Primary Care Provider: Sita Keller MD (Tel: [phone])      Subjective:  . Refused dialysis this am. Nausea improved. NG remains in place. Has had several bowel movements thru the night. NG output has not been documented since the 16th.      Was liberated from the vent on POD # 1 ( 5/11/22)   POD # 8 from Expl Lap with VIC for SBO     Reviewed interval ancillary notes    Current Medications  insulin glargine (LANTUS) injection vial 35 Units, BID  PN-Adult Premix 5/20 - Standard Electrolytes - Central Line, Continuous TPN  albuterol (PROVENTIL) nebulizer solution 2.5 mg, BID  bisacodyl (DULCOLAX) suppository 10 mg, Daily  diphenhydrAMINE-zinc acetate cream, TID PRN  insulin lispro (HUMALOG) injection vial 0-18 Units, Q4H  albuterol (PROVENTIL) nebulizer solution 2.5 mg, Q4H PRN  fat emulsion 20 % infusion 250 mL, Once per day on Mon Thu  epoetin claire-epbx (RETACRIT) injection 10,000 Units, Once per day on Tue Thu Sat  morphine (PF) injection 2 mg, Q2H PRN   Or  morphine injection 4 mg, Q2H PRN  metoprolol tartrate (LOPRESSOR) tablet 25 mg, BID  QUEtiapine (SEROQUEL) tablet 12.5 mg, Nightly  pantoprazole (PROTONIX) injection 20 mg, Daily  metoprolol (LOPRESSOR) injection 5 mg, Q6H PRN  LORazepam (ATIVAN) injection 0.5 mg, Q12H PRN  sodium chloride flush 0.9 % injection 5-40 mL, 2 times per day  0.9 % sodium chloride infusion, PRN  ondansetron (ZOFRAN-ODT) disintegrating tablet 4 mg, Q8H PRN   Or  ondansetron (ZOFRAN) injection 4 mg, Q6H PRN  acetaminophen (TYLENOL) tablet 650 mg, Q6H PRN   Or  acetaminophen (TYLENOL) suppository 650 mg, Q6H PRN  sodium chloride flush 0.9 % injection 5-40 mL, 2 times per day  sodium chloride flush 0.9 % injection 5-40 mL, PRN  0.9 % sodium chloride infusion, PRN  heparin (porcine) injection 5,000 Units, TID  polyethylene glycol (GLYCOLAX) packet 17 g, Daily PRN  glucose (GLUTOSE) 40 % oral gel 15 g, PRN  glucagon (rDNA) injection 1 mg, PRN  dextrose 5 % solution, PRN  dextrose bolus 10% 125 mL, PRN   Or  dextrose bolus 10% 250 mL, PRN  heparin (porcine) injection 1,000 Units, Once  diphenhydrAMINE (BENADRYL) injection 25 mg, Q6H PRN        Objective:  /70   Pulse 91   Temp 97.7 °F (36.5 °C) (Axillary)   Resp 18   Ht 5' 4\" (1.626 m)   Wt 268 lb 3.2 oz (121.7 kg)   SpO2 100%   BMI 46.04 kg/m²     Intake/Output Summary (Last 24 hours) at 5/19/2022 1017  Last data filed at 5/18/2022 2352  Gross per 24 hour   Intake 2410.91 ml   Output 1200 ml   Net 1210.91 ml      Wt Readings from Last 3 Encounters:   05/19/22 268 lb 3.2 oz (121.7 kg)   05/01/22 (!) 309 lb 8 oz (140.4 kg)   04/26/22 291 lb (132 kg)       General appearance:  Appears chronically ill and obese. Awake, she is alert and oriented but wants to rest, morbidly obese  Eyes: Sclera clear. Pupils equal.  ENT: Moist oral mucosa. Trachea midline, no adenopathy. NG in place  Cardiovascular: Regular rhythm, normal S1, S2. No murmur. No edema in lower extremities  Respiratory: Not using accessory muscles. trach collar in place with 8 liters of oxygen, no ronchi or wheezing. Decreased in bases   GI: Abdomen softly distended and obese with rare bowel sounds. NG to suction with green drainage. abd dressing is clean dry intact.   abd binder is in place    Musculoskeletal: No cyanosis in digits, neck supple  Neurology: moving arms and her head, sedation is being weaned   Psych: sedated   Skin: Warm, dry, poor  Turgor, scratch marks notes on chest and arms but appear to be healing     Labs and Tests:  CBC:   Recent Labs     05/17/22  0545 05/18/22  0610 05/19/22  0827   WBC 10.9 11.3* 12.6*   HGB 7.5* 7.4* 7.7*    241 280     BMP:    Recent Labs     05/17/22  0545 05/18/22  0610 05/19/22  0826   * 133* 131*   K 4.2 4.2 4.6   CL 93* 92* 90*   CO2 23 27 24   BUN 94* 70* 98*   CREATININE 5.8* 4.5* 5.6*   GLUCOSE 224* 210* 200*     Hepatic:   Recent Labs     05/19/22  0826   AST 8*   ALT 6*   BILITOT 0.7   ALKPHOS 118         Problem List  Principal Problem:    SBO (small bowel obstruction) ([facility])  Active Problems:    Fecal impaction (HCC)    ESRD on dialysis ([facility])    Chronic respiratory failure requiring continuous mechanical ventilation through tracheostomy ([facility] [address]    History of CVA (cerebrovascular accident)    Anemia, chronic disease    Chronic diastolic heart failure (HCC)    Essential hypertension    GERD (gastroesophageal reflux disease)    Morbid obesity with BMI of 50.0-59.9, adult ([facility])    SILVER (obstructive sleep apnea)  Resolved Problems:    * No resolved hospital problems. *       Assessment & Plan:   1. SBO: POD # 8 from Exp Lap with VIC: GS following. Still has NG tube. NG output had not been recorded in 2 days however was 1200 in the time. Patient has had several bowel movements though. ? Whether NG can be removed. 2. Leukocytosis-check CXR, blood cultures. 3. PAF-  On BB, rate in the 80 - 90   4. ESRD:  On HD q [facility], Saturday,  Nephrology is following. Refused dialysis today. 5. Hx of respiratory failure:  S/p trach > 1 year ago. She has been extubated, currently on trach collar, humidified oxygen. She refuses to use IS. 6. T2DM:  Current hyperglycemia likely due to TPN given AIC of 5.4  Will continue lantus at 35 units BID   7. Hx of DVT: coumadin on hold ,on SC heparin  8. Malnutrition: TPN started 5/11  9. Bilateral renal cysts per CT scan: CT with and without contrast of kidney per renal mass protocol outpatient to exclude neoplasm   10. Acute on chronic anemia:  on retacrit , Hgb 7.7, stable,   She has had 2 units post operatively    11. Denied LTAC-will appeal. ? Whether NG will be removed.        Diet: Diet NPO Exceptions are: Ice Chips, Sips of Water with Meds, Sips of Clear Liquids, Popsicles  PN-Adult Premix 5/20 - Standard Electrolytes - Central Line  Code:Full Code  DVT PPX    Memory BRYON Muller   5/19/2022 10:17 AM

## 2022-05-19 NOTE — PROGRESS NOTES
05/19/22 1937   RT Protocol   History Pulmonary Disease 2   Respiratory pattern 0   Breath sounds 2   Cough 1   Bronchodilator Assessment Score 5

## 2022-05-19 NOTE — PROGRESS NOTES
[facility]   Nephrology progress Note  [phone]   [facility]. [employer]           SUMMARY :  We are following this patient for ESRD on maintenance hemodialysis   78-year-old female with past medical history of ESRD on maintenance hemodialysis, CVA, morbid obesity, type 2 diabetes, pulmonary hypertension, atrial fibrillation, was sent to from the [address] [facility] home with shortness of breath    SUBJECTIVE:   Patient progress reviewed. The patient says she has abdominal pain  . NG suction ongoing  On TPN  Refuses dialysis today     Physical Exam:    VITALS:  /70   Pulse 91   Temp 97.7 °F (36.5 °C) (Axillary)   Resp 18   Ht 5' 4\" (1.626 m)   Wt 268 lb 3.2 oz (121.7 kg)   SpO2 100%   BMI 46.04 kg/m²   BLOOD PRESSURE RANGE:  Systolic (08TID), FDQ:316 , Min:94 , JQV:829   ; Diastolic (02DEJ), ERV:08, Min:57, Max:74    24HR INTAKE/OUTPUT:      Intake/Output Summary (Last 24 hours) at 5/19/2022 1051  Last data filed at 5/18/2022 2352  Gross per 24 hour   Intake 2410.91 ml   Output 1200 ml   Net 1210.91 ml       Gen:  alert, oriented x 3, Obese  PERRL , EOM +  Pallor +, No icterus  Tracheostomy +  JVP not raised   CV: RRR no murmur or rub . Lungs:B/ L air entry, Normal breath sounds   Abd: soft, bowel sounds + , No organomegaly   Ext: No edema, no cyanosis  Skin: Warm.   No rash  Neuro: nonfocal.  Left upper arm AVG , thrill +      DATA:    CBC with Differential:    Lab Results   Component Value Date    WBC 12.6 05/19/2022    RBC 2.64 05/19/2022    HGB 7.7 05/19/2022    HCT 24.8 05/19/2022     05/19/2022    MCV 94.1 05/19/2022    MCH 29.0 05/19/2022    MCHC 30.9 05/19/2022    RDW 19.0 05/19/2022    NRBC 1 05/19/2022    BANDSPCT 55 05/19/2022    METASPCT 7 05/19/2022    LYMPHOPCT 6.0 05/19/2022    MONOPCT 3.0 05/19/2022    MYELOPCT 1 05/19/2022    BASOPCT 0.0 05/19/2022    MONOSABS 0.4 05/19/2022    LYMPHSABS 0.8 05/19/2022    EOSABS 0.6 05/19/2022    BASOSABS 0.0 05/19/2022     CMP:    Lab Results   Component Value Date     05/19/2022    K 4.6 05/19/2022    K 4.7 05/06/2022    CL 90 05/19/2022    CO2 24 05/19/2022    BUN 98 05/19/2022    CREATININE 5.6 05/19/2022    GFRAA 9 05/19/2022    AGRATIO 0.5 05/19/2022    LABGLOM 7 05/19/2022    GLUCOSE 200 05/19/2022    PROT 7.7 05/19/2022    LABALBU 2.5 05/19/2022    CALCIUM 9.3 05/19/2022    BILITOT 0.7 05/19/2022    ALKPHOS 118 05/19/2022    AST 8 05/19/2022    ALT 6 05/19/2022     Magnesium:    Lab Results   Component Value Date    MG 2.40 05/19/2022     Phosphorus:    Lab Results   Component Value Date    PHOS 5.6 05/19/2022     Uric Acid:    Lab Results   Component Value Date    LABURIC 6.3 04/29/2022     Troponin:    Lab Results   Component Value Date    TROPONINI 0.20 05/06/2022     U/A:  No results found for: NITRITE, COLORU, PROTEINU, PHUR, LABCAST, WBCUA, RBCUA, MUCUS, TRICHOMONAS, YEAST, BACTERIA, CLARITYU, SPECGRAV, LEUKOCYTESUR, UROBILINOGEN, BILIRUBINUR, BLOODU, GLUCOSEU, AMORPHOUS      IMPRESSION/RECOMMENDATIONS:      Diagnosis and Plan     1. ESRD on HD  TTS at [facility]    Refusing dialysis ,     2. Anemia of CKD/ vaginal Bleeding : target Hb 9-11, on KOKO with HD    3. Renal osteodystrophy : Monitor Ca and phos   4.  Left Renal mass ( CT abdomen and pelvis  5/6/22)   Numerous cysts are identified within the   kidneys bilaterally.  Many of the cysts are simple fluid in density, however   several are also not consistent with simple cysts. Rosalinda An is a 40 Hounsfield   unit lesion in the lower pole the left kidney measuring 3.6 cm in size.       Urology Consulted     5.  exploratory laparotomy with lysis of adhesions for small bowel obstruction on 5/10/22    Liset Fairchild MD

## 2022-05-19 NOTE — PLAN OF CARE
Problem: Skin/Tissue Integrity  Goal: Absence of new skin breakdown  Description: 1. Monitor for areas of redness and/or skin breakdown  2. Assess vascular access sites hourly  3. Every 4-6 hours minimum:  Change oxygen saturation probe site  4. Every 4-6 hours:  If on nasal continuous positive airway pressure, respiratory therapy assess nares and determine need for appliance change or resting period. Note: Complete linen change, pt placed on lift pad to better turn/reposition pt in bed. PT bathed using bath wipes, gown changed. NG dressing replaced, face cleansed. PT encouraged to assist in turning, shift weight in bed, allow for extra pillow support.

## 2022-05-19 NOTE — PROGRESS NOTES
[address]  Patient has size 6XLT Mariz[place]. Trach Kit of same size on standby  Yes, Obturator at head of the bed  Yes. Inner cannula cleaned/replaced Yes, drain sponge changed  Yes, Trach tie replaced is soiled No, Flange cleaned  Yes. [address] performed without complications.      Electronically signed by Deidre Astorga RCP on 5/19/2022 at 7:39 PM

## 2022-05-20 LAB
ANION GAP SERPL CALCULATED.3IONS-SCNC: 18 MMOL/L (ref 3–16)
BUN BLDV-MCNC: 125 MG/DL (ref 7–20)
CALCIUM SERPL-MCNC: 8.9 MG/DL (ref 8.3–10.6)
CHLORIDE BLD-SCNC: 90 MMOL/L (ref 99–110)
CO2: 22 MMOL/L (ref 21–32)
CREAT SERPL-MCNC: 6.3 MG/DL (ref 0.6–1.2)
GFR AFRICAN AMERICAN: 8
GFR NON-AFRICAN AMERICAN: 7
GLUCOSE BLD-MCNC: 112 MG/DL (ref 70–99)
GLUCOSE BLD-MCNC: 163 MG/DL (ref 70–99)
GLUCOSE BLD-MCNC: 186 MG/DL (ref 70–99)
GLUCOSE BLD-MCNC: 193 MG/DL (ref 70–99)
GLUCOSE BLD-MCNC: 205 MG/DL (ref 70–99)
GLUCOSE BLD-MCNC: 210 MG/DL (ref 70–99)
HCT VFR BLD CALC: 22.8 % (ref 36–48)
HEMATOLOGY PATH CONSULT: NORMAL
HEMOGLOBIN: 7.1 G/DL (ref 12–16)
MAGNESIUM: 2.4 MG/DL (ref 1.8–2.4)
MCH RBC QN AUTO: 29.3 PG (ref 26–34)
MCHC RBC AUTO-ENTMCNC: 31.1 G/DL (ref 31–36)
MCV RBC AUTO: 94.3 FL (ref 80–100)
PDW BLD-RTO: 18.5 % (ref 12.4–15.4)
PERFORMED ON: ABNORMAL
PLATELET # BLD: 289 K/UL (ref 135–450)
PMV BLD AUTO: 8.7 FL (ref 5–10.5)
POTASSIUM SERPL-SCNC: 5.1 MMOL/L (ref 3.5–5.1)
RBC # BLD: 2.41 M/UL (ref 4–5.2)
SODIUM BLD-SCNC: 130 MMOL/L (ref 136–145)
WBC # BLD: 13.8 K/UL (ref 4–11)

## 2022-05-20 PROCEDURE — 80048 BASIC METABOLIC PNL TOTAL CA: CPT

## 2022-05-20 PROCEDURE — 2580000003 HC RX 258: Performed by: SURGERY

## 2022-05-20 PROCEDURE — 94761 N-INVAS EAR/PLS OXIMETRY MLT: CPT

## 2022-05-20 PROCEDURE — C9113 INJ PANTOPRAZOLE SODIUM, VIA: HCPCS | Performed by: SURGERY

## 2022-05-20 PROCEDURE — 94640 AIRWAY INHALATION TREATMENT: CPT

## 2022-05-20 PROCEDURE — APPSS15 APP SPLIT SHARED TIME 0-15 MINUTES: Performed by: NURSE PRACTITIONER

## 2022-05-20 PROCEDURE — 6360000002 HC RX W HCPCS: Performed by: SURGERY

## 2022-05-20 PROCEDURE — 6370000000 HC RX 637 (ALT 250 FOR IP): Performed by: SURGERY

## 2022-05-20 PROCEDURE — 99024 POSTOP FOLLOW-UP VISIT: CPT | Performed by: SURGERY

## 2022-05-20 PROCEDURE — 6370000000 HC RX 637 (ALT 250 FOR IP): Performed by: NURSE PRACTITIONER

## 2022-05-20 PROCEDURE — 85027 COMPLETE CBC AUTOMATED: CPT

## 2022-05-20 PROCEDURE — 90935 HEMODIALYSIS ONE EVALUATION: CPT

## 2022-05-20 PROCEDURE — APPNB30 APP NON BILLABLE TIME 0-30 MINS: Performed by: NURSE PRACTITIONER

## 2022-05-20 PROCEDURE — 6360000002 HC RX W HCPCS: Performed by: NURSE PRACTITIONER

## 2022-05-20 PROCEDURE — 83735 ASSAY OF MAGNESIUM: CPT

## 2022-05-20 PROCEDURE — 2700000000 HC OXYGEN THERAPY PER DAY

## 2022-05-20 PROCEDURE — 36415 COLL VENOUS BLD VENIPUNCTURE: CPT

## 2022-05-20 PROCEDURE — 2060000000 HC ICU INTERMEDIATE R&B

## 2022-05-20 PROCEDURE — 6370000000 HC RX 637 (ALT 250 FOR IP): Performed by: INTERNAL MEDICINE

## 2022-05-20 RX ADMIN — INSULIN LISPRO 6 UNITS: 100 INJECTION, SOLUTION INTRAVENOUS; SUBCUTANEOUS at 00:27

## 2022-05-20 RX ADMIN — Medication 10 ML: at 09:15

## 2022-05-20 RX ADMIN — Medication 10 ML: at 21:51

## 2022-05-20 RX ADMIN — Medication 10 ML: at 21:56

## 2022-05-20 RX ADMIN — INSULIN LISPRO 6 UNITS: 100 INJECTION, SOLUTION INTRAVENOUS; SUBCUTANEOUS at 04:54

## 2022-05-20 RX ADMIN — ALBUTEROL SULFATE 2.5 MG: 2.5 SOLUTION RESPIRATORY (INHALATION) at 08:23

## 2022-05-20 RX ADMIN — ACETAMINOPHEN 650 MG: 325 TABLET ORAL at 00:47

## 2022-05-20 RX ADMIN — MORPHINE SULFATE 2 MG: 2 INJECTION, SOLUTION INTRAMUSCULAR; INTRAVENOUS at 18:07

## 2022-05-20 RX ADMIN — METOCLOPRAMIDE 5 MG: 5 INJECTION, SOLUTION INTRAMUSCULAR; INTRAVENOUS at 00:26

## 2022-05-20 RX ADMIN — INSULIN GLARGINE 35 UNITS: 100 INJECTION, SOLUTION SUBCUTANEOUS at 21:51

## 2022-05-20 RX ADMIN — METOCLOPRAMIDE 5 MG: 5 INJECTION, SOLUTION INTRAMUSCULAR; INTRAVENOUS at 12:40

## 2022-05-20 RX ADMIN — QUETIAPINE FUMARATE 12.5 MG: 25 TABLET ORAL at 21:51

## 2022-05-20 RX ADMIN — ALBUTEROL SULFATE 2.5 MG: 2.5 SOLUTION RESPIRATORY (INHALATION) at 21:16

## 2022-05-20 RX ADMIN — INSULIN LISPRO 6 UNITS: 100 INJECTION, SOLUTION INTRAVENOUS; SUBCUTANEOUS at 09:15

## 2022-05-20 RX ADMIN — PANTOPRAZOLE SODIUM 20 MG: 40 INJECTION, POWDER, FOR SOLUTION INTRAVENOUS at 09:15

## 2022-05-20 RX ADMIN — SODIUM CHLORIDE, PRESERVATIVE FREE 10 ML: 5 INJECTION INTRAVENOUS at 12:40

## 2022-05-20 RX ADMIN — METOPROLOL TARTRATE 25 MG: 25 TABLET, FILM COATED ORAL at 09:15

## 2022-05-20 RX ADMIN — INSULIN LISPRO 3 UNITS: 100 INJECTION, SOLUTION INTRAVENOUS; SUBCUTANEOUS at 12:40

## 2022-05-20 RX ADMIN — HEPARIN SODIUM 5000 UNITS: 5000 INJECTION INTRAVENOUS; SUBCUTANEOUS at 09:15

## 2022-05-20 RX ADMIN — DIPHENHYDRAMINE HYDROCHLORIDE 25 MG: 50 INJECTION, SOLUTION INTRAMUSCULAR; INTRAVENOUS at 22:25

## 2022-05-20 RX ADMIN — INSULIN GLARGINE 35 UNITS: 100 INJECTION, SOLUTION SUBCUTANEOUS at 09:15

## 2022-05-20 RX ADMIN — HEPARIN SODIUM 5000 UNITS: 5000 INJECTION INTRAVENOUS; SUBCUTANEOUS at 21:51

## 2022-05-20 RX ADMIN — SODIUM CHLORIDE, PRESERVATIVE FREE 10 ML: 5 INJECTION INTRAVENOUS at 09:15

## 2022-05-20 RX ADMIN — METOCLOPRAMIDE 5 MG: 5 INJECTION, SOLUTION INTRAMUSCULAR; INTRAVENOUS at 06:02

## 2022-05-20 RX ADMIN — ALBUTEROL SULFATE 2.5 MG: 2.5 SOLUTION RESPIRATORY (INHALATION) at 13:31

## 2022-05-20 ASSESSMENT — PAIN DESCRIPTION - FREQUENCY: FREQUENCY: CONTINUOUS

## 2022-05-20 ASSESSMENT — PAIN SCALES - WONG BAKER
WONGBAKER_NUMERICALRESPONSE: 0

## 2022-05-20 ASSESSMENT — PAIN DESCRIPTION - PAIN TYPE: TYPE: ACUTE PAIN

## 2022-05-20 ASSESSMENT — PAIN DESCRIPTION - ONSET: ONSET: ON-GOING

## 2022-05-20 ASSESSMENT — PAIN DESCRIPTION - DESCRIPTORS: DESCRIPTORS: ACHING

## 2022-05-20 ASSESSMENT — PAIN DESCRIPTION - ORIENTATION
ORIENTATION: RIGHT
ORIENTATION: RIGHT

## 2022-05-20 ASSESSMENT — PAIN DESCRIPTION - LOCATION
LOCATION: LEG
LOCATION: LEG

## 2022-05-20 ASSESSMENT — PAIN SCALES - GENERAL
PAINLEVEL_OUTOF10: 0
PAINLEVEL_OUTOF10: 8
PAINLEVEL_OUTOF10: 10

## 2022-05-20 ASSESSMENT — PAIN - FUNCTIONAL ASSESSMENT: PAIN_FUNCTIONAL_ASSESSMENT: ACTIVITIES ARE NOT PREVENTED

## 2022-05-20 NOTE — PROGRESS NOTES
Clinical Pharmacy Note    Pharmacy consulted by Dr. Senait Ahmadi to manage TPN    Current TPN rate: 83 ml/hr  Goal TPN rate: 83 ml/hr    Labs:  General Labs:  BMP:    Lab Results   Component Value Date     05/19/2022    K 4.6 05/19/2022    K 4.7 05/06/2022    CL 90 05/19/2022    CO2 24 05/19/2022    BUN 98 05/19/2022    LABALBU 2.5 05/19/2022    CREATININE 5.6 05/19/2022    CALCIUM 9.3 05/19/2022    GFRAA 9 05/19/2022    LABGLOM 7 05/19/2022    GLUCOSE 200 05/19/2022     Magnesium:    Lab Results   Component Value Date    MG 2.40 05/19/2022     Phosphorus:    Lab Results   Component Value Date    PHOS 5.6 05/19/2022     Blood sugars: 179-238    Electrolyte replacement as follows: Today's labs are pending as of 0728. Blood sugar management:  Continue Lantus + SSI at current doses for now. Plan to continue TPN to 83 ml/hr. Thank you for allowing pharmacy to participate in the care of this patient.     Yung Calles, [facility], PharmD 5/20/2022

## 2022-05-20 NOTE — PROGRESS NOTES
Nutrition Note    RECOMMENDATIONS  1. PO Diet: Continue NPO. Recommend beginning po diet once surgery gives orders. 2. Nutrition Support:   a. Recommend continue to check Mg, Phos, CMP daily. b. Recommend continue Clinimix 5/20 at goal rate of 83 mL/hr.   c. Physician/LIP to monitor and correct lytes (Phos,Mg,K+). d. Recommend 250 mL 20% lipids 2 times per week, due to start on 5/19  e. Recommend FSBS, monitor glucose, need for insulin  f. Pharmacy to adjust MVI and Trace Elements as needed    NUTRITION ASSESSMENT   Pt triggered for follow-up. TPN started 5/11 per Dr. Lynda Jean and currently at goal rate of 83 mL/hr. NPO with sips of water, clear liquids, popsicles. NG tube for suction removed yesterday. LBM 5/19, BS+. Recommend continue TPN as is for now. Once po diet is able to be started per surgery, recommend reducing TPN to 40 mL/hr. RD will continue to monitor.  Nutrition Related Findings: -2.9L. Todays labs pending. Glucose 205, 210 today. LBM 5/19, BS+. Oriented x2. +1 generalized, LUE edema.  5/12   Wounds: Surgical Incision,Skin Tears,Multiple,Wound Vac   Nutrition Education:  Education not indicated    Nutrition Goals:  Tolerate nutrition support at goal rate,Initiate PO diet,by next RD assessment     MALNUTRITION ASSESSMENT   Acute Illness  Malnutrition Status: No malnutrition    NUTRITION DIAGNOSIS   · Inadequate oral intake related to altered GI function as evidenced by NPO or clear liquid status due to medical condition,nutrition support - parenteral nutrition    CURRENT NUTRITION THERAPIES  Diet NPO Exceptions are: Ice Chips, Sips of Water with Meds, Sips of Clear Liquids, Popsicles  PN-Adult Premix 5/20 - Standard Electrolytes - Central Line     PO Intake: NPO   PO Supplement Intake:NPO    Current Parenteral Nutrition Recs:  · Type and Formula: Premix Central   · Lipids: Two times weekly  · Duration: Continuous  · Current PN Order Provides: As below  · Goal PN Orders Provides: Clinimix 5/20 at 83 mL/hr provides 1992 mL total volume, 1755 kcal, 100g protein, dex load 2.3 mg/kg/min    ANTHROPOMETRICS   Current Height: 5' 4\" (162.6 cm)   Current Weight: 266 lb 3.2 oz (120.7 kg)     Admission weight: 298 lb (135.2 kg)   Ideal Body Weight (IBW): 120 lbs  (55 kg)        BMI: 45.6    COMPARATIVE STANDARDS  Energy (kcal):  968 - 1815     Protein (g):  99 - 109       Fluid (mL/day):  1815    The patient will be monitored per nutrition standards of care. Consult dietitian if additional nutrition interventions are needed prior to RD reassessment.      Dorie Ibanez RD, LD    Contact: [phone]

## 2022-05-20 NOTE — CARE COORDINATION
SW informed pt's appeal to [facility] was approved. Hospitalist states pt will need to stay 1 more day in the hospital.  Possible discharge tomorrow. Left a message w/Linda at [facility] asking if a bed will be ready tomorrow at [facility]. Waiting a return call. Addendum:  [facility] has a bed at [facility]. Hospitalist reported can discharge after [address] and set up transport for 1pm tomorrow. Discharge packet started and left on pt's chart. Will need to fax completed AVS/AMADEO to facility. Pato Zamorano informed of transport time. Discharge Plan:  [address]  [place], [address] [facility]  Report = [phone]  Fax = [phone]    Transport set w/[facility] at 1pm tomorrow, 05/20/22.     Electronically signed by EDMUNDO Jarvis, LSW on 5/20/2022 at 3:02 PM

## 2022-05-20 NOTE — PROGRESS NOTES
[facility]   Nephrology progress Note  [phone]   [facility]. [address]           SUMMARY :  We are following this patient for ESRD on maintenance hemodialysis   71-year-old female with past medical history of ESRD on maintenance hemodialysis, CVA, morbid obesity, type 2 diabetes, pulmonary hypertension, atrial fibrillation, was sent to from the [address] [facility] home with shortness of breath    SUBJECTIVE:   Patient progress reviewed. The patient says she has abdominal pain  . On TPN  She says she is willing to get dialysis today      Physical Exam:    VITALS:  BP (!) 107/55   Pulse 72   Temp 97.9 °F (36.6 °C) (Oral)   Resp 20   Ht 5' 4\" (1.626 m)   Wt 266 lb 3.2 oz (120.7 kg)   SpO2 100%   BMI 45.69 kg/m²   BLOOD PRESSURE RANGE:  Systolic (57VWV), REQ:185 , Min:92 , SMT:454   ; Diastolic (66WVC), DPF:75, Min:55, Max:78    24HR INTAKE/OUTPUT:    No intake or output data in the 24 hours ending 05/20/22 1125    Gen:  alert, oriented x 3, Obese  PERRL , EOM +  Pallor +, No icterus  Tracheostomy +  JVP not raised   CV: RRR no murmur or rub . Lungs:B/ L air entry, Normal breath sounds   Abd: soft, bowel sounds + , No organomegaly   Ext: No edema, no cyanosis  Skin: Warm.   No rash  Neuro: nonfocal.  Left upper arm AVG , thrill +      DATA:    CBC with Differential:    Lab Results   Component Value Date    WBC 13.8 05/20/2022    RBC 2.41 05/20/2022    HGB 7.1 05/20/2022    HCT 22.8 05/20/2022     05/20/2022    MCV 94.3 05/20/2022    MCH 29.3 05/20/2022    MCHC 31.1 05/20/2022    RDW 18.5 05/20/2022    NRBC 1 05/19/2022    BANDSPCT 55 05/19/2022    METASPCT 7 05/19/2022    LYMPHOPCT 6.0 05/19/2022    MONOPCT 3.0 05/19/2022    MYELOPCT 1 05/19/2022    BASOPCT 0.0 05/19/2022    MONOSABS 0.4 05/19/2022    LYMPHSABS 0.8 05/19/2022    EOSABS 0.6 05/19/2022    BASOSABS 0.0 05/19/2022     CMP:    Lab Results   Component Value Date     05/20/2022    K 5.1 05/20/2022    K 4.7 05/06/2022    CL 90 05/20/2022    CO2 22 05/20/2022     05/20/2022    CREATININE 6.3 05/20/2022    GFRAA 8 05/20/2022    AGRATIO 0.5 05/19/2022    LABGLOM 7 05/20/2022    GLUCOSE 193 05/20/2022    PROT 7.7 05/19/2022    LABALBU 2.5 05/19/2022    CALCIUM 8.9 05/20/2022    BILITOT 0.7 05/19/2022    ALKPHOS 118 05/19/2022    AST 8 05/19/2022    ALT 6 05/19/2022     Magnesium:    Lab Results   Component Value Date    MG 2.40 05/20/2022     Phosphorus:    Lab Results   Component Value Date    PHOS 5.6 05/19/2022     Uric Acid:    Lab Results   Component Value Date    LABURIC 6.3 04/29/2022     Troponin:    Lab Results   Component Value Date    TROPONINI 0.20 05/06/2022     U/A:  No results found for: NITRITE, COLORU, PROTEINU, PHUR, LABCAST, WBCUA, RBCUA, MUCUS, TRICHOMONAS, YEAST, BACTERIA, CLARITYU, SPECGRAV, LEUKOCYTESUR, UROBILINOGEN, BILIRUBINUR, BLOODU, GLUCOSEU, AMORPHOUS      IMPRESSION/RECOMMENDATIONS:      Diagnosis and Plan     1. ESRD on HD  TTS at [facility]    Refusing dialysis ,     2. Anemia of CKD/ vaginal Bleeding : target Hb 9-11, on KOKO with HD, Hb low at 7.1     3. Renal osteodystrophy : Monitor Ca and phos   4.  Left Renal mass ( CT abdomen and pelvis  5/6/22)   Numerous cysts are identified within the   kidneys bilaterally.  Many of the cysts are simple fluid in density, however   several are also not consistent with simple cysts. Sina Fail is a 40 Hounsfield   unit lesion in the lower pole the left kidney measuring 3.6 cm in size.       Urology Consulted     5.  exploratory laparotomy with lysis of adhesions for small bowel obstruction on 5/10/22    Dena Leventhal, MD

## 2022-05-20 NOTE — DISCHARGE INSTR - DIET
Good nutrition is important when healing from an illness, injury, or surgery. Follow any nutrition recommendations given to you during your hospital stay. If you were given an oral nutrition supplement while in the hospital, continue to take this supplement at home. You can take it with meals, in-between meals, and/or before bedtime. These supplements can be purchased at most local grocery stores, pharmacies, and chain [employer]-stores. If you have any questions about your diet or nutrition, call the hospital and ask for the dietitian.

## 2022-05-20 NOTE — PROGRESS NOTES
[address]  Patient has size 6 Shiley XLT [place], XLT or other. Trach Kit of same size on standby  Yes, Obturator at head of the bed  No. Inner cannula cleaned/replaced Yes, drain sponge changed  Yes, Trach tie replaced is soiled No, Flange cleaned  Yes. [address] performed without complications.

## 2022-05-20 NOTE — PLAN OF CARE
Problem: Pain  Goal: Verbalizes/displays adequate comfort level or baseline comfort level  Outcome: Progressing  Note: Patient denies any pain at this time. Will continue to monitor. Problem: Safety - Adult  Goal: Free from fall injury  Outcome: Progressing  Note: Patient remains absent from falls at this time. Remains in bed with eyes closed, call light and belongings in reach. Non-slip footwear on and 2/4 siderails raised. Bed remains in lowest/locked position at all times with alarm activated. Fall precautions in place. Camera in room. Patient encouraged to use call light to request assistance, v/u.  Will continue to monitor.

## 2022-05-20 NOTE — FLOWSHEET NOTE
05/20/22 1438 05/20/22 1644   Vital Signs   BP (!) 98/47 (!) 111/45   Temp 97.4 °F (36.3 °C) 97.2 °F (36.2 °C)   Pulse 71 76   Resp 16 16   Weight 266 lb 1.5 oz (120.7 kg) 265 lb 10.5 oz (120.5 kg)   Weight Method Bed scale Bed scale     Treatment time: 2:06 of 3 hours  Net UF: 190 ml     Access used: RAVF  Access function: good with  ml/min           Summary of response to treatment: pt tolerated tx poorly, wanted off 59 mins early d/t pain in rectum, MD notified. Copy of dialysis treatment record placed in chart, to be scanned into EMR.

## 2022-05-20 NOTE — PROGRESS NOTES
Awake, calls out frequently, anxious. C/O right leg pain. Abdominal dressing d/i, denies abdominal pain, no nausea. Gave Tylenol per prn order. VSS. Assessment completed, see flow charts. No distress noted. imani

## 2022-05-20 NOTE — PROGRESS NOTES
[facility]        Progress Note    Pt Name: Abilio Wilson  MRN: [id number]  [place]: 1950  Date of evaluation: 2022    Subjective:    No acute events overnight  Incisional pain well controlled, complains of left sided, superficial pain, at skin surface  No nausea or vomiting since NGT removed, feels hungry  Passing flatus and stool  Resting in bed at this time    Postoperative Day #10      Vital Signs:  Patient Vitals for the past 24 hrs:   BP Temp Temp src Pulse Resp SpO2 Height Weight   22 1220 (!) 105/59 98 °F (36.7 °C) Oral 66 20 99 % -- --   22 1134 (!) 94/50 97.5 °F (36.4 °C) Oral 70 20 100 % -- --   22 0845 (!) 107/55 97.9 °F (36.6 °C) Oral 72 20 100 % -- --   22 0836 -- -- -- -- -- -- 5' 4\" (1.626 m) --   22 0827 -- -- -- -- -- 100 % -- --   22 0730 107/64 98.1 °F (36.7 °C) Oral 80 20 100 % -- --   22 0430 -- -- -- -- -- -- -- 266 lb 3.2 oz (120.7 kg)   22 0415 (!) 107/57 97.7 °F (36.5 °C) Oral 78 20 100 % -- --   22 0140 -- -- -- -- -- 99 % -- --   22 0015 (!) 92/58 98.4 °F (36.9 °C) Oral 77 20 99 % -- --   22 -- -- -- -- 20 100 % -- --   22 -- -- -- -- 20 98 % -- --   22 -- -- -- -- 20 -- -- --   22 -- -- -- -- 18 -- -- --   22 117/78 98.4 °F (36.9 °C) Axillary 80 18 100 % -- --   22 1613 119/68 97.6 °F (36.4 °C) Axillary 79 18 97 % -- --      TEMPERATURE HISTORY 24H: Temp (24hrs), Av °F (36.7 °C), Min:97.5 °F (36.4 °C), Max:98.4 °F (36.9 °C)    BLOOD PRESSURE HISTORY: Systolic (65BSY), ZMP:511 , Min:92 , SLT:541    Diastolic (91HZL), GRA:54, Min:50, Max:78      Intake/Output:    I/O last 3 completed shifts: In: 2410.9   Out: 1600 [Emesis/NG output:1600]  No intake/output data recorded.   Drain/tube Output:         Physical Exam:  General: awake, alert, trach collar, interacts appropriately  Pulmonary: unlabored respirations  Abdomen: soft, mild distension verses obesity, appropriate incisional tenderness only, bowel sounds present  Skin/Wound: healing well, no drainage, no erythema, well approximated with staples    Labs:  CBC:    Recent Labs     05/18/22  0610 05/19/22  0827 05/20/22  0843   WBC 11.3* 12.6* 13.8*   HGB 7.4* 7.7* 7.1*   HCT 23.5* 24.8* 22.8*    280 289     BMP:    Recent Labs     05/18/22  0610 05/19/22  0826 05/20/22  0843   * 131* 130*   K 4.2 4.6 5.1   CL 92* 90* 90*   CO2 27 24 22   BUN 70* 98* 125*   CREATININE 4.5* 5.6* 6.3*   GLUCOSE 210* 200* 193*     Hepatic:   Recent Labs     05/19/22  0826   AST 8*   ALT 6*   BILITOT 0.7   ALKPHOS 118     Amylase: No results for input(s): AMYLASE in the last 72 hours. Lipase: No results for input(s): LIPASE in the last 72 hours. Mag:      Recent Labs     05/18/22  0610 05/19/22  0826 05/20/22  0843   MG 2.20 2.40 2.40      Phos:     Recent Labs     05/18/22  0610 05/19/22  0826   PHOS 4.3 5.6*      Coags: No results for input(s): INR, APTT in the last 72 hours. Cultures:  Relevant cultures documented under results section     Pathology:  No relevant pathology    Imaging:  I have personally reviewed the following films:    XR CHEST PORTABLE    Result Date: 5/19/2022  EXAMINATION: ONE XRAY VIEW OF THE CHEST 5/19/2022 10:25 am COMPARISON: 05/16/2022. HISTORY: ORDERING SYSTEM PROVIDED HISTORY: leukocytosis TECHNOLOGIST PROVIDED HISTORY: Reason for exam:->leukocytosis Reason for Exam: Leukocytosis FINDINGS: The cardiac silhouette is enlarged and stable. Lung volumes are mildly decreased with resultant vascular crowding towards the lung bases. No acute infiltrate, significant pleural fluid or evidence of overt failure. Tracheostomy appliance, enteric catheter extending into the proximal gastric body and left IJ catheter remain in place. Mild decreased lung volumes. No acute cardiopulmonary disease.      Scheduled Meds:   insulin glargine  35 Units SubCUTAneous BID    albuterol  2.5 mg Nebulization BID    bisacodyl  10 mg Rectal Daily    insulin lispro  0-18 Units SubCUTAneous Q4H    fat emulsion  250 mL IntraVENous Once per day on Mon Thu    epoetin claire-epbx  10,000 Units IntraVENous Once per day on Tue Thu Sat    metoprolol tartrate  25 mg Oral BID    QUEtiapine  12.5 mg Oral Nightly    pantoprazole  20 mg IntraVENous Daily    sodium chloride flush  5-40 mL IntraVENous 2 times per day    sodium chloride flush  5-40 mL IntraVENous 2 times per day    heparin (porcine)  5,000 Units SubCUTAneous TID    heparin (porcine)  1,000 Units IntraVENous Once     Continuous Infusions:   PN-Adult Premix 5/20 - Standard Electrolytes - Central Line 83 mL/hr at 05/19/22 1818    sodium chloride Stopped (05/13/22 1453)    sodium chloride Stopped (05/12/22 1746)    dextrose       PRN Meds:.morphine **OR** [DISCONTINUED] morphine, diphenhydrAMINE-zinc acetate, albuterol, metoprolol, LORazepam, sodium chloride, ondansetron **OR** ondansetron, acetaminophen **OR** acetaminophen, sodium chloride flush, sodium chloride, polyethylene glycol, glucose, glucagon (rDNA), dextrose, dextrose bolus **OR** dextrose bolus, diphenhydrAMINE      Assessment:  OR Date 5/10/2022, exploratory laparotomy with lysis of adhesions for small bowel obstruction  Fecal impaction  Chronic respiratory failure on trach/vent  Hypertension  Diabetes  End-stage renal disease on hemodialysis  Paroxysmal atrial fibrillation  History of DVT, on Coumadin  Vaginal bleeding  Morbid obesity, BMI 44.3  Postoperative ileus      Plan:  1. Abdominal exam appropriate, passing flatus and stool, no nausea or vomiting since NGT removed; continued supportive care  2. Advance to clear liquid diet as tolerated; monitor bowel function--continue Dulcolax suppository daily  3. IV hydration and electrolyte correction per nephrology  4. Activity as tolerated  5.  PRN analgesics and antiemetics--minimizing narcotics as tolerated  6. DVT prophylaxis with heparin injections; okay to resume oral anticoagulation from a surgical perspective  7. Management of medical comorbid etiologies per primary team and consulting services  8. Disposition: Okay for discharge to LTAC when arrangements have been made    EDUCATION:  Educated patient on plan of care and disease process--all questions answered. Plans discussed with patient and nursing. Reviewed and discussed with Dr. Bello Walker.       Signed:  ITALO Smith - CNP  5/20/2022 12:52 PM     Surgery Staff:     Pt seen and examined with NP  See full note above  Pt has had no issues with NG out, had BM  Will begin clears  Appears LTAC planned for tomorrow    William Almeida MD

## 2022-05-20 NOTE — PROGRESS NOTES
[address]  Patient has size 6 XLT . Trach Kit of same size on standby  Yes, Obturator at head of the bed  Yes. Inner cannula cleaned/replaced Yes, drain sponge changed  Yes, Trach tie replaced is soiled No, Flange cleaned  Yes. [address] performed without complications.

## 2022-05-20 NOTE — PLAN OF CARE
Problem: Skin/Tissue Integrity  Goal: Absence of new skin breakdown  Description: 1. Monitor for areas of redness and/or skin breakdown  2. Assess vascular access sites hourly  3. Every 4-6 hours minimum:  Change oxygen saturation probe site  4. Every 4-6 hours:  If on nasal continuous positive airway pressure, respiratory therapy assess nares and determine need for appliance change or resting period. Note:      Attempted to give pt bed bath but was only able to clean abdominal folds, breast folds and tho area. Told us to leave her alone. Also refused to be repositioned. imani

## 2022-05-21 VITALS
HEIGHT: 64 IN | TEMPERATURE: 98.2 F | RESPIRATION RATE: 16 BRPM | SYSTOLIC BLOOD PRESSURE: 105 MMHG | BODY MASS INDEX: 45.35 KG/M2 | HEART RATE: 86 BPM | DIASTOLIC BLOOD PRESSURE: 62 MMHG | OXYGEN SATURATION: 100 % | WEIGHT: 265.65 LBS

## 2022-05-21 LAB
GLUCOSE BLD-MCNC: 138 MG/DL (ref 70–99)
GLUCOSE BLD-MCNC: 46 MG/DL (ref 70–99)
GLUCOSE BLD-MCNC: 50 MG/DL (ref 70–99)
GLUCOSE BLD-MCNC: 50 MG/DL (ref 70–99)
GLUCOSE BLD-MCNC: 52 MG/DL (ref 70–99)
GLUCOSE BLD-MCNC: 76 MG/DL (ref 70–99)
GLUCOSE BLD-MCNC: 81 MG/DL (ref 70–99)
GLUCOSE BLD-MCNC: 87 MG/DL (ref 70–99)
GLUCOSE BLD-MCNC: 88 MG/DL (ref 70–99)
PERFORMED ON: ABNORMAL
PERFORMED ON: NORMAL

## 2022-05-21 PROCEDURE — 2580000003 HC RX 258: Performed by: INTERNAL MEDICINE

## 2022-05-21 PROCEDURE — 6360000002 HC RX W HCPCS: Performed by: SURGERY

## 2022-05-21 PROCEDURE — 6360000002 HC RX W HCPCS: Performed by: NURSE PRACTITIONER

## 2022-05-21 PROCEDURE — 2700000000 HC OXYGEN THERAPY PER DAY

## 2022-05-21 PROCEDURE — 6370000000 HC RX 637 (ALT 250 FOR IP): Performed by: SURGERY

## 2022-05-21 PROCEDURE — 99024 POSTOP FOLLOW-UP VISIT: CPT | Performed by: SURGERY

## 2022-05-21 PROCEDURE — 94761 N-INVAS EAR/PLS OXIMETRY MLT: CPT

## 2022-05-21 PROCEDURE — 2500000003 HC RX 250 WO HCPCS: Performed by: SURGERY

## 2022-05-21 PROCEDURE — C9113 INJ PANTOPRAZOLE SODIUM, VIA: HCPCS | Performed by: SURGERY

## 2022-05-21 PROCEDURE — 2580000003 HC RX 258: Performed by: SURGERY

## 2022-05-21 PROCEDURE — 94640 AIRWAY INHALATION TREATMENT: CPT

## 2022-05-21 RX ORDER — INSULIN GLARGINE 100 [IU]/ML
15 INJECTION, SOLUTION SUBCUTANEOUS 2 TIMES DAILY
Qty: 10 ML | Refills: 3 | Status: ON HOLD | OUTPATIENT
Start: 2022-05-21 | End: 2022-10-28 | Stop reason: HOSPADM

## 2022-05-21 RX ORDER — DEXTROSE MONOHYDRATE 100 MG/ML
INJECTION, SOLUTION INTRAVENOUS CONTINUOUS
Status: DISCONTINUED | OUTPATIENT
Start: 2022-05-21 | End: 2022-05-21 | Stop reason: HOSPADM

## 2022-05-21 RX ORDER — INSULIN GLARGINE 100 [IU]/ML
35 INJECTION, SOLUTION SUBCUTANEOUS 2 TIMES DAILY
Qty: 10 ML | Refills: 3 | Status: SHIPPED | OUTPATIENT
Start: 2022-05-21 | End: 2022-05-21

## 2022-05-21 RX ORDER — ONDANSETRON 4 MG/1
4 TABLET, ORALLY DISINTEGRATING ORAL EVERY 8 HOURS PRN
Qty: 15 TABLET | Refills: 0 | Status: CANCELLED | OUTPATIENT
Start: 2022-05-21

## 2022-05-21 RX ORDER — INSULIN GLARGINE 100 [IU]/ML
15 INJECTION, SOLUTION SUBCUTANEOUS 2 TIMES DAILY
Status: DISCONTINUED | OUTPATIENT
Start: 2022-05-21 | End: 2022-05-21 | Stop reason: HOSPADM

## 2022-05-21 RX ORDER — BISACODYL 10 MG
10 SUPPOSITORY, RECTAL RECTAL DAILY
Qty: 30 SUPPOSITORY | Refills: 0 | Status: SHIPPED | OUTPATIENT
Start: 2022-05-21 | End: 2022-06-20

## 2022-05-21 RX ADMIN — ACETAMINOPHEN 650 MG: 325 TABLET ORAL at 08:20

## 2022-05-21 RX ADMIN — DEXTROSE MONOHYDRATE: 100 INJECTION, SOLUTION INTRAVENOUS at 12:00

## 2022-05-21 RX ADMIN — DEXTROSE MONOHYDRATE 250 ML: 100 INJECTION, SOLUTION INTRAVENOUS at 02:25

## 2022-05-21 RX ADMIN — DEXTROSE MONOHYDRATE 125 ML: 100 INJECTION, SOLUTION INTRAVENOUS at 07:41

## 2022-05-21 RX ADMIN — PANTOPRAZOLE SODIUM 20 MG: 40 INJECTION, POWDER, FOR SOLUTION INTRAVENOUS at 08:20

## 2022-05-21 RX ADMIN — Medication 10 ML: at 08:20

## 2022-05-21 RX ADMIN — ALBUTEROL SULFATE 2.5 MG: 2.5 SOLUTION RESPIRATORY (INHALATION) at 08:28

## 2022-05-21 RX ADMIN — HEPARIN SODIUM 5000 UNITS: 5000 INJECTION INTRAVENOUS; SUBCUTANEOUS at 15:10

## 2022-05-21 RX ADMIN — DEXTROSE MONOHYDRATE: 100 INJECTION, SOLUTION INTRAVENOUS at 16:04

## 2022-05-21 RX ADMIN — DEXTROSE MONOHYDRATE 125 ML: 100 INJECTION, SOLUTION INTRAVENOUS at 11:34

## 2022-05-21 RX ADMIN — HEPARIN SODIUM 5000 UNITS: 5000 INJECTION INTRAVENOUS; SUBCUTANEOUS at 08:20

## 2022-05-21 RX ADMIN — GLUCAGON HYDROCHLORIDE 1 MG: KIT at 02:02

## 2022-05-21 RX ADMIN — MORPHINE SULFATE 2 MG: 2 INJECTION, SOLUTION INTRAMUSCULAR; INTRAVENOUS at 00:00

## 2022-05-21 RX ADMIN — DIPHENHYDRAMINE HYDROCHLORIDE 25 MG: 50 INJECTION, SOLUTION INTRAMUSCULAR; INTRAVENOUS at 05:11

## 2022-05-21 ASSESSMENT — PAIN SCALES - GENERAL
PAINLEVEL_OUTOF10: 10
PAINLEVEL_OUTOF10: 0

## 2022-05-21 ASSESSMENT — PAIN SCALES - WONG BAKER
WONGBAKER_NUMERICALRESPONSE: 0

## 2022-05-21 ASSESSMENT — PAIN DESCRIPTION - INTENSITY: RATING_2: 0

## 2022-05-21 NOTE — PROGRESS NOTES
Ann Rodriguez is a 71 y.o. female patient.     Current Facility-Administered Medications   Medication Dose Route Frequency Provider Last Rate Last Admin    insulin glargine (LANTUS) injection vial 15 Units  15 Units SubCUTAneous BID Maude Lee MD        dextrose 10 % infusion   IntraVENous Continuous Maude Lee MD 75 mL/hr at 05/21/22 1200 New Bag at 05/21/22 1200    morphine (PF) injection 2 mg  2 mg IntraVENous Q4H PRN Pinky Biggs MD   2 mg at 05/21/22 0000    albuterol (PROVENTIL) nebulizer solution 2.5 mg  2.5 mg Nebulization BID Yara Rico Dechristopher, APRN - CNP   2.5 mg at 05/21/22 0262    bisacodyl (DULCOLAX) suppository 10 mg  10 mg Rectal Daily Suni Notch, APRN - CNP   10 mg at 05/18/22 0847    diphenhydrAMINE-zinc acetate cream   Topical TID PRN Suni Notch, APRN - CNP   Given at 05/15/22 2040    insulin lispro (HUMALOG) injection vial 0-18 Units  0-18 Units SubCUTAneous Q4H Madonna L Dechristopher, APRN - CNP   3 Units at 05/20/22 1240    albuterol (PROVENTIL) nebulizer solution 2.5 mg  2.5 mg Nebulization Q4H PRN Madonna L Dechristopher, APRN - CNP   2.5 mg at 05/20/22 1331    fat emulsion 20 % infusion 250 mL  250 mL IntraVENous Once per day on Mon Thu Chioma Nichols MD   Stopped at 05/20/22 2326    epoetin claire-epbx (RETACRIT) injection 10,000 Units  10,000 Units IntraVENous Once per day on Tue Thu Sat Pinky Biggs MD   10,000 Units at 05/17/22 1128    metoprolol tartrate (LOPRESSOR) tablet 25 mg  25 mg Oral BID Pinky Biggs MD   25 mg at 05/20/22 0915    QUEtiapine (SEROQUEL) tablet 12.5 mg  12.5 mg Oral Nightly Pinky Biggs MD   12.5 mg at 05/20/22 2151    pantoprazole (PROTONIX) injection 20 mg  20 mg IntraVENous Daily Pinky Biggs MD   20 mg at 05/21/22 0820    metoprolol (LOPRESSOR) injection 5 mg  5 mg IntraVENous Q6H PRN Vernadine MD Kalyan   5 mg at 05/08/22 1602    LORazepam (ATIVAN) injection 0.5 mg  0.5 mg IntraVENous Q12H PRN Yakelin Pearson MD   0.5 mg at 05/18/22 1334    sodium chloride flush 0.9 % injection 5-40 mL  5-40 mL IntraVENous 2 times per day Yakelin Pearson MD   10 mL at 05/21/22 0820    0.9 % sodium chloride infusion   IntraVENous PRN Yakelin Pearson MD   Stopped at 05/13/22 1453    ondansetron (ZOFRAN-ODT) disintegrating tablet 4 mg  4 mg Oral Q8H PRN Yakelin Pearson MD        Or    [facility] injection 4 mg  4 mg IntraVENous Q6H PRN Yakelin Pearson MD   4 mg at 05/15/22 2208    acetaminophen (TYLENOL) tablet 650 mg  650 mg Oral Q6H PRN Yakelin Pearson MD   650 mg at 05/21/22 0820    Or    acetaminophen (TYLENOL) suppository 650 mg  650 mg Rectal Q6H PRN Yakelin Pearson MD        sodium chloride flush 0.9 % injection 5-40 mL  5-40 mL IntraVENous 2 times per day Yakelin Pearson MD   10 mL at 05/21/22 0820    sodium chloride flush 0.9 % injection 5-40 mL  5-40 mL IntraVENous PRN Yakelin Pearson MD   10 mL at 05/20/22 1240    0.9 % sodium chloride infusion   IntraVENous PRSANCHO Pearson MD   Paused at 05/12/22 1746    heparin (porcine) injection 5,000 Units  5,000 Units SubCUTAneous TID Yakelin Pearson MD   5,000 Units at 05/21/22 0820    polyethylene glycol (GLYCOLAX) packet 17 g  17 g Oral Daily PRN Yakelin Pearson MD        glucose (GLUTOSE) 40 % oral gel 15 g  15 g Oral PRN Yakelin Pearson MD        glucagon (rDNA) injection 1 mg  1 mg IntraMUSCular PRN Yakelin Pearson MD   1 mg at 05/21/22 0202    dextrose 5 % solution  100 mL/hr IntraVENous PRN Yakelin Pearson MD        dextrose bolus 10% 125 mL  125 mL IntraVENous KIMBERLY Pearson MD   Stopped at 05/21/22 1142    Or    dextrose bolus 10% 250 mL  250 mL IntraVENous KSENIAN Yakelin Pearson MD   Stopped at 05/21/22 0241    heparin (porcine) injection 1,000 Units  1,000 Units IntraVENous Once Los Moore MD        diphenhydrAMINE (BENADRYL) injection 25 mg  25 mg IntraVENous Q6H PRN Los Moore MD   25 mg at 05/21/22 6554     Allergies   Allergen Reactions    Haloperidol Lactate Other (See Comments)     PT DOESN'T REMEMBER      Ziprasidone Hcl Other (See Comments)     PT DOESN'T REMEMBER       Principal Problem:    SBO (small bowel obstruction) ([facility])  Active Problems:    Fecal impaction ([facility]    ESRD on dialysis ([facility]    Chronic respiratory failure requiring continuous mechanical ventilation through tracheostomy ([facility]    History of CVA (cerebrovascular accident)    Anemia, chronic disease    Chronic diastolic heart failure ([facility]    Essential hypertension    GERD (gastroesophageal reflux disease)    Morbid obesity with BMI of 50.0-59.9, adult ([facility])    SILVER (obstructive sleep apnea)  Resolved Problems:    * No resolved hospital problems. *    Blood pressure 105/62, pulse 86, temperature 98.2 °F (36.8 °C), temperature source Oral, resp. rate 16, height 5' 4\" (1.626 m), weight 265 lb 10.5 oz (120.5 kg), SpO2 100 %. Subjective:  Symptoms:  Stable. Diet:  Adequate intake. Objective:  General Appearance:  Comfortable. Vital signs: (most recent): Blood pressure 105/62, pulse 86, temperature 98.2 °F (36.8 °C), temperature source Oral, resp. rate 16, height 5' 4\" (1.626 m), weight 265 lb 10.5 oz (120.5 kg), SpO2 100 %. Abdomen: Abdomen is soft and non-distended. (Incision approximated without evidence of infection). Neurological: Patient is alert and oriented to person, place and time. Skin:  Warm and dry. Assessment & Plan 78-year-old vent dependent female who is postop day #10 status post lysis of adhesions for a small bowel obstruction. Doing well surgically. Okay for disposition from surgical standpoint.     Miguel A Dial MD  5/21/2022

## 2022-05-21 NOTE — PLAN OF CARE
Problem: Discharge Planning  Goal: Discharge to home or other facility with appropriate resources  5/21/2022 0245 by Mei Victoria RN  Outcome: Progressing     Problem: Pain  Goal: Verbalizes/displays adequate comfort level or baseline comfort level  5/21/2022 0634 by Cristela rFy RN  Outcome: Progressing  5/21/2022 0245 by Mei Victoria RN  Outcome: Progressing     Problem: Skin/Tissue Integrity  Goal: Absence of new skin breakdown  Description: 1. Monitor for areas of redness and/or skin breakdown  2. Assess vascular access sites hourly  3. Every 4-6 hours minimum:  Change oxygen saturation probe site  4. Every 4-6 hours:  If on nasal continuous positive airway pressure, respiratory therapy assess nares and determine need for appliance change or resting period.   5/21/2022 0681 by Cristela Fry RN  Outcome: Progressing  5/21/2022 0245 by Mei Victoria RN  Outcome: Progressing     Problem: Safety - Adult  Goal: Free from fall injury  5/21/2022 0634 by Cristela Fry RN  Outcome: Progressing  Flowsheets (Taken 5/21/2022 0246 by Mei Victoria RN)  Free From Fall Injury: Instruct family/caregiver on patient safety  5/21/2022 0245 by Mei Victoria RN  Outcome: Progressing     Problem: ABCDS Injury Assessment  Goal: Absence of physical injury  5/21/2022 0634 by Cirstela Fry RN  Outcome: Progressing  Flowsheets (Taken 5/21/2022 0246 by Mei Victoria RN)  Absence of Physical Injury: Implement safety measures based on patient assessment  5/21/2022 0245 by Mei Victoria RN  Outcome: Progressing     Problem: Neurosensory - Adult  Goal: Achieves maximal functionality and self care  5/21/2022 0245 by Mei Victoria RN  Outcome: Progressing     Problem: Respiratory - Adult  Goal: Achieves optimal ventilation and oxygenation  5/21/2022 0245 by Mei Victoria RN  Outcome: Progressing     Problem: Cardiovascular - Adult  Goal: Maintains optimal cardiac output and hemodynamic stability  5/21/2022 0245 by Larry Medellin RN  Outcome: Progressing  Goal: Absence of cardiac dysrhythmias or at baseline  5/21/2022 0245 by Larry Medellin RN  Outcome: Progressing     Problem: Skin/Tissue Integrity - Adult  Goal: Skin integrity remains intact  5/21/2022 0634 by Muna Mart RN  Outcome: Progressing  Flowsheets (Taken 5/21/2022 0247 by Larry Medellin RN)  Skin Integrity Remains Intact:   Monitor for areas of redness and/or skin breakdown   Assess vascular access sites hourly  5/21/2022 0245 by Larry Medellin RN  Outcome: Progressing  Flowsheets (Taken 5/20/2022 2330)  Skin Integrity Remains Intact:   Monitor for areas of redness and/or skin breakdown   Assess vascular access sites hourly     Problem: Musculoskeletal - Adult  Goal: Return mobility to safest level of function  5/21/2022 0245 by Larry Medellin RN  Outcome: Progressing     Problem: Gastrointestinal - Adult  Goal: Minimal or absence of nausea and vomiting  5/21/2022 0245 by Larry Medellin RN  Outcome: Progressing  Goal: Maintains or returns to baseline bowel function  5/21/2022 0245 by Larry Medellin RN  Outcome: Progressing  Goal: Maintains adequate nutritional intake  5/21/2022 0245 by Larry Medellin RN  Outcome: Progressing     Problem: Genitourinary - Adult  Goal: Urinary catheter remains patent  5/21/2022 0245 by Larry Medellin RN  Outcome: Progressing     Problem: Infection - Adult  Goal: Absence of infection at discharge  5/21/2022 0245 by Larry Medellin RN  Outcome: Progressing     Problem: Metabolic/Fluid and Electrolytes - Adult  Goal: Electrolytes maintained within normal limits  5/21/2022 0245 by Larry Medellin RN  Outcome: Progressing  Goal: Hemodynamic stability and optimal renal function maintained  5/21/2022 0245 by Larry Medellin RN  Outcome: Progressing  Goal: Glucose maintained within prescribed range  5/21/2022 0245 by Larry Medellin RN  Outcome: Progressing     Problem: Hematologic - Adult  Goal: Maintains hematologic stability  5/21/2022 0245 by Zackary Pack RN  Outcome: Progressing     Problem: Nutrition Deficit:  Goal: Optimize nutritional status  5/21/2022 0245 by Zackary Pack RN  Outcome: Progressing     Problem: Chronic Conditions and Co-morbidities  Goal: Patient's chronic conditions and co-morbidity symptoms are monitored and maintained or improved  5/21/2022 0245 by Zackary Pack RN  Outcome: Progressing

## 2022-05-21 NOTE — PLAN OF CARE
Problem: Discharge Planning  Goal: Discharge to home or other facility with appropriate resources  Outcome: Progressing     Problem: Pain  Goal: Verbalizes/displays adequate comfort level or baseline comfort level  Outcome: Progressing     Problem: Skin/Tissue Integrity  Goal: Absence of new skin breakdown  Description: 1. Monitor for areas of redness and/or skin breakdown  2. Assess vascular access sites hourly  3. Every 4-6 hours minimum:  Change oxygen saturation probe site  4. Every 4-6 hours:  If on nasal continuous positive airway pressure, respiratory therapy assess nares and determine need for appliance change or resting period.   Outcome: Progressing     Problem: Safety - Adult  Goal: Free from fall injury  Outcome: Progressing     Problem: ABCDS Injury Assessment  Goal: Absence of physical injury  Outcome: Progressing     Problem: Neurosensory - Adult  Goal: Achieves maximal functionality and self care  Outcome: Progressing     Problem: Respiratory - Adult  Goal: Achieves optimal ventilation and oxygenation  Outcome: Progressing     Problem: Cardiovascular - Adult  Goal: Maintains optimal cardiac output and hemodynamic stability  Outcome: Progressing  Goal: Absence of cardiac dysrhythmias or at baseline  Outcome: Progressing     Problem: Skin/Tissue Integrity - Adult  Goal: Skin integrity remains intact  Outcome: Progressing  Flowsheets (Taken 5/20/2022 2330)  Skin Integrity Remains Intact:   Monitor for areas of redness and/or skin breakdown   Assess vascular access sites hourly     Problem: Musculoskeletal - Adult  Goal: Return mobility to safest level of function  Outcome: Progressing     Problem: Gastrointestinal - Adult  Goal: Minimal or absence of nausea and vomiting  Outcome: Progressing  Goal: Maintains or returns to baseline bowel function  Outcome: Progressing  Goal: Maintains adequate nutritional intake  Outcome: Progressing     Problem: Genitourinary - Adult  Goal: Urinary catheter remains patent  Outcome: Progressing     Problem: Infection - Adult  Goal: Absence of infection at discharge  Outcome: Progressing     Problem: Metabolic/Fluid and Electrolytes - Adult  Goal: Electrolytes maintained within normal limits  Outcome: Progressing  Goal: Hemodynamic stability and optimal renal function maintained  Outcome: Progressing  Goal: Glucose maintained within prescribed range  Outcome: Progressing     Problem: Hematologic - Adult  Goal: Maintains hematologic stability  Outcome: Progressing     Problem: Nutrition Deficit:  Goal: Optimize nutritional status  Outcome: Progressing     Problem: Chronic Conditions and Co-morbidities  Goal: Patient's chronic conditions and co-morbidity symptoms are monitored and maintained or improved  Outcome: Progressing

## 2022-05-21 NOTE — PROGRESS NOTES
PRN  glucagon (rDNA) injection 1 mg, PRN  dextrose 5 % solution, PRN  dextrose bolus 10% 125 mL, PRN   Or  dextrose bolus 10% 250 mL, PRN  heparin (porcine) injection 1,000 Units, Once  diphenhydrAMINE (BENADRYL) injection 25 mg, Q6H PRN        Objective:  BP (!) 93/56   Pulse 86   Temp 97.5 °F (36.4 °C) (Oral)   Resp 18   Ht 5' 4\" (1.626 m)   Wt 265 lb 10.5 oz (120.5 kg)   SpO2 100%   BMI 45.60 kg/m²   No intake or output data in the 24 hours ending 05/21/22 0711   Wt Readings from Last 3 Encounters:   05/20/22 265 lb 10.5 oz (120.5 kg)   05/01/22 (!) 309 lb 8 oz (140.4 kg)   04/26/22 291 lb (132 kg)       General appearance:  Appears chronically ill and obese. Awake, she is alert and oriented but wants to rest, morbidly obese  Eyes: Sclera clear. Pupils equal.  ENT: Moist oral mucosa. Trachea midline, no adenopathy. NG in place  Cardiovascular: Regular rhythm, normal S1, S2. No murmur. No edema in lower extremities  Respiratory: Not using accessory muscles. trach collar in place with 8 liters of oxygen, no ronchi or wheezing. Decreased in bases   GI: Abdomen softly distended and obese with rare bowel sounds. NG to suction with green drainage. abd dressing is clean dry intact.   abd binder is in place    Musculoskeletal: No cyanosis in digits, neck supple  Neurology: moving arms and her head, sedation is being weaned   Psych: sedated   Skin: Warm, dry, poor  Turgor, scratch marks notes on chest and arms but appear to be healing     Labs and Tests:  CBC:   Recent Labs     05/19/22  0827 05/20/22  0843   WBC 12.6* 13.8*   HGB 7.7* 7.1*    289     BMP:    Recent Labs     05/19/22  0826 05/20/22  0843   * 130*   K 4.6 5.1   CL 90* 90*   CO2 24 22   BUN 98* 125*   CREATININE 5.6* 6.3*   GLUCOSE 200* 193*     Hepatic:   Recent Labs     05/19/22 0826   AST 8*   ALT 6*   BILITOT 0.7   ALKPHOS 118         Problem List  Principal Problem:    SBO (small bowel obstruction) ([facility])  Active Problems:    Fecal impaction ([facility] [address]    ESRD on dialysis ([facility]    Chronic respiratory failure requiring continuous mechanical ventilation through tracheostomy ([facility]    History of CVA (cerebrovascular accident)    Anemia, chronic disease    Chronic diastolic heart failure ([facility]    Essential hypertension    GERD (gastroesophageal reflux disease)    Morbid obesity with BMI of 50.0-59.9, adult ([facility])    SILVER (obstructive sleep apnea)  Resolved Problems:    * No resolved hospital problems. *       Assessment & Plan:   1. SBO: POD # 10 from Exp Lap with VIC: GS following. Still has NG tube. NG output had not been recorded in 2 days however was 1200 in the time. Patient has had several bowel movements though. ? Whether NG can be removed, defer to surgery. 2. Leukocytosis-check CXR, blood cultures. 3. PAF-  On BB, rate in the 80 - 90   4. ESRD:  On HD q [facility], Saturday,  Nephrology is following. Refused dialysis yesterday but seen in dialysis today. 5. Hx of respiratory failure:  S/p trach > 1 year ago. She has been extubated, currently on trach collar, humidified oxygen. She refuses to use IS. 6. T2DM:  Current hyperglycemia likely due to TPN given AIC of 5.4  Will continue lantus at 35 units BID   7. Hx of DVT: coumadin on hold ,on SC heparin  8. Malnutrition: TPN started 5/11, she has been advanced to clears  9. Bilateral renal cysts per CT scan: CT with and without contrast of kidney per renal mass protocol outpatient to exclude neoplasm   10. Acute on chronic anemia:  on retacrit , Hgb 7.7, stable,   She has had 2 units post operatively    11. Denied LTAC- overturned on appeal. She is going to d/c tomorrow. Transport is being arranged. Diet: ADULT DIET;  Clear Liquid  Code:Full Code  DVT PPX    Yoseph Mantilla MD   5/21/2022 7:11 AM

## 2022-05-21 NOTE — PROGRESS NOTES
Report called to [address] [place], [place] [address] [phone]. Spoke with Mariza Cat RN. All questions answered and agreeable to transfer. Informed of scheduled 1300 pickup via [facility]. Patient aware of and agreeable to plan. Discharge packet prepared per SW. Patient prepared per Nursing. CVC to remain at time of transfer. Will continue to monitor.

## 2022-05-21 NOTE — CARE COORDINATION
Transportation set for 1pm w/[facility] to:    [facility])  [address] [facility], [address] [place] [address]  Admissions: [phone]  Main:  [phone]  [phone]

## 2022-05-21 NOTE — PLAN OF CARE
Problem: Pain  Goal: Verbalizes/displays adequate comfort level or baseline comfort level  5/21/2022 0829 by Tim Seo RN  Outcome: Progressing  Note: Patient with c/o foot pain 10/10. PRN Tylenol administered per patient request - see MAR. Patient assisted to reposition in bed with pillow support provided. Will continue to monitor. Problem: Safety - Adult  Goal: Free from fall injury  5/21/2022 0829 by Tim Seo RN  Outcome: Progressing  Note: Patient remains absent from falls at this time. Remains in bed with eyes closed, call light and belongings in reach. Non-slip footwear on and 2/4 siderails raised. Bed remains in lowest/locked position at all times with alarm activated. Fall precautions in place. Camera in room. Patient encouraged to use call light to request assistance, v/u.  Will continue to monitor.

## 2022-05-21 NOTE — PROGRESS NOTES
[facility]   Nephrology progress Note  [phone]   [facility]. [employer]           SUMMARY :  We are following this patient for ESRD on maintenance hemodialysis   67-year-old female with past medical history of ESRD on maintenance hemodialysis, CVA, morbid obesity, type 2 diabetes, pulmonary hypertension, atrial fibrillation, was sent to from the [address] [facility] home with shortness of breath    SUBJECTIVE:   Patient progress reviewed. Refuses dialysis again today , could not convince her to do it       Physical Exam:    VITALS:  BP (!) 95/49   Pulse 88   Temp 97.7 °F (36.5 °C) (Oral)   Resp 18   Ht 5' 4\" (1.626 m)   Wt 265 lb 10.5 oz (120.5 kg)   SpO2 98%   BMI 45.60 kg/m²   BLOOD PRESSURE RANGE:  Systolic (95JFU), AIU:62 , Min:87 , HLA:949   ; Diastolic (29UVB), VRR:78, Min:45, Max:59    24HR INTAKE/OUTPUT:    No intake or output data in the 24 hours ending 05/21/22 0942    Gen:  alert, oriented x 3, Obese  PERRL , EOM +  Pallor +, No icterus  Tracheostomy +  JVP not raised   CV: RRR no murmur or rub . Lungs:B/ L air entry, Normal breath sounds   Abd: soft, bowel sounds + , No organomegaly   Ext: No edema, no cyanosis  Skin: Warm.   No rash  Neuro: nonfocal.  Left upper arm AVG , thrill +      DATA:    CBC with Differential:    Lab Results   Component Value Date    WBC 13.8 05/20/2022    RBC 2.41 05/20/2022    HGB 7.1 05/20/2022    HCT 22.8 05/20/2022     05/20/2022    MCV 94.3 05/20/2022    MCH 29.3 05/20/2022    MCHC 31.1 05/20/2022    RDW 18.5 05/20/2022    NRBC 1 05/19/2022    BANDSPCT 55 05/19/2022    METASPCT 7 05/19/2022    LYMPHOPCT 6.0 05/19/2022    MONOPCT 3.0 05/19/2022    MYELOPCT 1 05/19/2022    BASOPCT 0.0 05/19/2022    MONOSABS 0.4 05/19/2022    LYMPHSABS 0.8 05/19/2022    EOSABS 0.6 05/19/2022    BASOSABS 0.0 05/19/2022     CMP:    Lab Results   Component Value Date     05/20/2022    K 5.1 05/20/2022    K 4.7 05/06/2022    CL 90 05/20/2022 CO2 22 05/20/2022     05/20/2022    CREATININE 6.3 05/20/2022    GFRAA 8 05/20/2022    AGRATIO 0.5 05/19/2022    LABGLOM 7 05/20/2022    GLUCOSE 193 05/20/2022    PROT 7.7 05/19/2022    LABALBU 2.5 05/19/2022    CALCIUM 8.9 05/20/2022    BILITOT 0.7 05/19/2022    ALKPHOS 118 05/19/2022    AST 8 05/19/2022    ALT 6 05/19/2022     Magnesium:    Lab Results   Component Value Date    MG 2.40 05/20/2022     Phosphorus:    Lab Results   Component Value Date    PHOS 5.6 05/19/2022     Uric Acid:    Lab Results   Component Value Date    LABURIC 6.3 04/29/2022     Troponin:    Lab Results   Component Value Date    TROPONINI 0.20 05/06/2022     U/A:  No results found for: NITRITE, COLORU, PROTEINU, PHUR, LABCAST, WBCUA, RBCUA, MUCUS, TRICHOMONAS, YEAST, BACTERIA, CLARITYU, SPECGRAV, LEUKOCYTESUR, UROBILINOGEN, BILIRUBINUR, BLOODU, GLUCOSEU, AMORPHOUS      IMPRESSION/RECOMMENDATIONS:      Diagnosis and Plan     1. ESRD on HD  TTS at [facility]    Refusing dialysis ,     2. Anemia of CKD/ vaginal Bleeding : target Hb 9-11, on KOKO with HD, Hb low at 7.1     3. Renal osteodystrophy : Monitor Ca and phos   4.  Left Renal mass ( CT abdomen and pelvis  5/6/22)   Numerous cysts are identified within the   kidneys bilaterally.  Many of the cysts are simple fluid in density, however   several are also not consistent with simple cysts. Juris Traci is a 40 Hounsfield   unit lesion in the lower pole the left kidney measuring 3.6 cm in size.       Urology Consulted     5.  exploratory laparotomy with lysis of adhesions for small bowel obstruction on 5/10/22    General Joseph MD

## 2022-05-21 NOTE — PROGRESS NOTES
Dialysis nurse called to inform  She will be putting in transport for pt to go for dialysis this AM. Informed pt, she responded with \"I am not doing dialysis\". Asked pt her reasoning and she responded \" I won't do it because I don't want to. \" Dialysis nurse notified.

## 2022-05-21 NOTE — PROGRESS NOTES
Patient discharged with [facility]. Pt moved to stretcher with RN and Ambulance company using lift. Pt clean and dry. Report given to transport, trach mask hooked to O2 for transporters.

## 2022-05-21 NOTE — PROGRESS NOTES
Call placed to [address] [place] [address] [phone] to update about delay in patient transport. Spoke with [facility], RN who v/u. Informed that we will notify her when patient departs this facility.

## 2022-05-21 NOTE — PROGRESS NOTES
Received phone call from [facility] stating refusing HD today. Dr. Rivera Nicholson notified.

## 2022-05-21 NOTE — DISCHARGE SUMMARY
Hospital Medicine Discharge Summary    Patient: Tom Mejia     Gender: female  : 1950   Age: 71 y.o. MRN: [id number]    Admitting Physician: Caleb Grant MD  Discharge Physician: Gely Gonzales MD     Code Status: Full Code     Admit Date: 2022   Discharge Date:   2022    Disposition:  Long-Term Acute Care    Discharge Diagnoses: Active Hospital Problems    Diagnosis Date Noted    Fecal impaction [facility]) [K56.41]      Priority: Medium    SBO (small bowel obstruction) ([facility] [address] [D28.947] 2022     Priority: Medium    ESRD on dialysis ([facility] [address] [N18.6, Z99.2]     Chronic respiratory failure requiring continuous mechanical ventilation through tracheostomy ([facility] [address] [J96.10, Z93.0, Z99.11]     History of CVA (cerebrovascular accident) [Z86.73]     GERD (gastroesophageal reflux disease) [K21.9] 10/10/2021    Morbid obesity with BMI of 50.0-59.9, adult ([facility] [address] [E66.01, Z68.43] 10/10/2021    Anemia, chronic disease [D63.8] 2021    SILVER (obstructive sleep apnea) [G47.33] 2021    Chronic diastolic heart failure ([facility] [address] [I50.32] 10/23/2018    Essential hypertension [I10] 2017       Follow-up appointments:  one week    Outpatient to do list: GO TO DIALYSIS! Condition at Discharge:  Stable    Hospital Course:   Tom Mejia is a 80 y.o. female with history of ESRD on HD, chronic respiratory failure s/p trach, h/o stroke, morbid obesity, DM2, pulmonary HTN, afib on Coumadin was brought to ER for abdominal pain, nausea with vomiting and SOB from [facility]. Found to have SBO in ED. Says she had small BM and flatus. No CP, HA or fevers. Denies cough. Constipated. No diarrhea or melena. Denies dysphagia. Missed HD. Otherwise complete ROS is negative unless listed above. She was diagnosed with SBO and went for Exploratory laparotomy with lysis of adhesions for small bowel obstruction on 5/10/2022. The bowel was viable and no resection was necessary.   No enterotomieswere made with the case. She has been on TPN since. She has refused dialysis several times, goes when she feels like it. She has had BMs and has been advanced to clears but is not really taking them  Her blood sugar was low today due to lack of oral intake she may need to continue on TPN until she takes po better. 1. SBO: POD # 11 from Exp Lap with VIC: GS following. Still has NG tube. NG output had not been recorded in 2 days however was 1200 in the time. Patient has had several bowel movements. Has been advanced to clears but is not taking much  2. Leukocytosis-check CXR, blood cultures. 3. PAF-  On BB, rate in the 80 - 90   4. ESRD:  On HD monica Garcia, Saturday,  Nephrology is following had dialysis yesterday but refused today. 5. Hx of respiratory failure:  S/p trach > 1 year ago. She has been extubated, currently on trach collar, humidified oxygen. She refuses to use IS. 6. T2DM: hypoglycemic today. Decrease lantus from 35 bid to 15 bid. Getting some D10 . Needs better PO intake. Continue TPN. LTAC to make determination on continuation of.  7. Hx of DVT: coumadin resumed. 8. Malnutrition: TPN started 5/11, she has been advanced to clears  9. Bilateral renal cysts per CT scan: CT with and without contrast of kidney per renal mass protocol outpatient to exclude neoplasm   10. Acute on chronic anemia:  on retacrit , Hgb 7.7, stable,   She has had 2 units post operatively    11.  Denied LTAC- overturned on appeal. She will d/c today.            Discharge Medications:   Current Discharge Medication List      START taking these medications    Details   metoprolol tartrate (LOPRESSOR) 25 MG tablet Take 1 tablet by mouth 2 times daily  Qty: 60 tablet, Refills: 3      bisacodyl (DULCOLAX) 10 MG suppository Place 1 suppository rectally daily  Qty: 30 suppository, Refills: 0           Current Discharge Medication List      CONTINUE these medications which have CHANGED    Details   insulin glargine (LANTUS) 100 UNIT/ML injection vial Inject 15 Units into the skin 2 times daily  Qty: 10 mL, Refills: 3           Current Discharge Medication List      CONTINUE these medications which have NOT CHANGED    Details   warfarin (COUMADIN) 3 MG tablet Take 1 tablet by mouth nightly  Qty: 30 tablet, Refills: 2      hydrocortisone (ANUSOL-HC) 2.5 % CREA rectal cream Place rectally 2 times daily  Qty: 1 each, Refills: 0      aspirin 81 MG EC tablet Take 81 mg by mouth daily      cyanocobalamin 1000 MCG tablet Take 500 mcg by mouth daily      QUEtiapine (SEROQUEL) 25 MG tablet Take 12.5 mg by mouth nightly      sodium polystyrene (KAYEXALATE) 15 GM/60ML suspension Take 15 g by mouth 4 times daily Sun, Mon, Wed, Fri      calcium acetate 667 MG TABS Take 1,334 mg by mouth 3 times daily (with meals)      hydrocortisone (PREPARATION H) 1 % cream Apply topically 2 times daily Apply topically 2 times daily.       acetaminophen (TYLENOL) 325 MG tablet Take 650 mg by mouth every 6 hours as needed for Pain      atorvastatin (LIPITOR) 80 MG tablet Take 80 mg by mouth at bedtime      diphenhydrAMINE (BENADRYL) 25 MG capsule Take 25 mg by mouth every 8 hours as needed       docusate sodium (COLACE) 100 MG capsule Take 100 mg by mouth 2 times daily      lactulose encephalopathy 10 GM/15ML SOLN solution Take 20 g by mouth daily as needed       polyethylene glycol (GLYCOLAX) 17 g packet Take 17 g by mouth daily as needed for Constipation      ipratropium-albuterol (DUONEB) 0.5-2.5 (3) MG/3ML SOLN nebulizer solution Inhale 1 vial into the lungs every 4 hours      furosemide (LASIX) 20 MG tablet Take 20 mg by mouth three times a week MWF      midodrine (PROAMATINE) 5 MG tablet Take 10 mg by mouth three times a week Tues thurs sat pre dialysis      omeprazole (PRILOSEC) 20 MG delayed release capsule Take 20 mg by mouth daily      chlorhexidine (PERIDEX) 0.12 % solution Take 15 mLs by mouth 2 times daily      sennosides-docusate sodium (SENOKOT-S) 8.6-50 MG tablet Take 2 tablets by mouth at bedtime       sertraline (ZOLOFT) 50 MG tablet Take 50 mg by mouth daily      terazosin (HYTRIN) 1 MG capsule Take 1 mg by mouth nightly      ondansetron (ZOFRAN) 4 MG tablet Take 4 mg by mouth every 8 hours as needed for Nausea or Vomiting           Current Discharge Medication List      STOP taking these medications       metoprolol succinate (TOPROL XL) 25 MG extended release tablet Comments:   Reason for Stopping:         insulin NPH (HUMULIN N;NOVOLIN N) 100 UNIT/ML injection vial Comments:   Reason for Stopping:         loperamide (IMODIUM) 2 MG capsule Comments:   Reason for Stopping:         dextromethorphan-guaiFENesin (MUCINEX DM)  MG per extended release tablet Comments:   Reason for Stopping:               Discharge ROS:  A complete review of systems was asked and negative except for refusing dialysis today because she doesn't want to. Discharge Exam:    /62   Pulse 86   Temp 98.2 °F (36.8 °C) (Oral)   Resp 16   Ht 5' 4\" (1.626 m)   Wt 265 lb 10.5 oz (120.5 kg)   SpO2 100%   BMI 45.60 kg/m²   General appearance:  NAD  HEENT:   Normal cephalic, atraumatic, moist mucous membranes, no oropharyngeal erythema or exudate  Neck: Supple, trachea midline, no anterior cervical or SC LAD  Heart[de-identified] Normal s1/s2, RRR, no murmurs, gallops, or rubs. 2+ leg edema  Lungs:  . Clear to auscultation, bilaterally without Rales/Wheezes/Rhonchi. Abdomen: Soft, non-tender, non-distended, bowel sounds present, no masses  Musculoskeletal:  No clubbing, no cyanosis, 2+edema  Skin: No lesion or masses  Neurologic:  Neurovascularly intact without any focal sensory/motor deficits. Cranial nerves: II-XII intact, grossly non-focal.  Psychiatric:  A & O x3  Neuro: Grossly intact, moves all four extremities     Labs:  For convenience and continuity at follow-up the following most recent labs are provided:    Lab Results   Component Value Date    WBC 13.8 05/20/2022    HGB 7.1 05/20/2022    HCT 22.8 05/20/2022    MCV 94.3 05/20/2022     05/20/2022     05/20/2022    K 5.1 05/20/2022    K 4.7 05/06/2022    CL 90 05/20/2022    CO2 22 05/20/2022     05/20/2022    CREATININE 6.3 05/20/2022    CALCIUM 8.9 05/20/2022    PHOS 5.6 05/19/2022    ALKPHOS 118 05/19/2022    ALT 6 05/19/2022    AST 8 05/19/2022    BILITOT 0.7 05/19/2022    LABALBU 2.5 05/19/2022    TRIG 126 05/12/2022     Lab Results   Component Value Date    INR 1.33 (H) 05/01/2022    INR 1.36 (H) 05/01/2022    INR 1.22 (H) 04/30/2022       Radiology:  CT ABDOMEN PELVIS WO CONTRAST Additional Contrast? None    Result Date: 5/6/2022  EXAMINATION: CT OF THE ABDOMEN AND PELVIS WITHOUT CONTRAST 5/6/2022 4:26 am TECHNIQUE: CT of the abdomen and pelvis was performed without the administration of intravenous contrast. Multiplanar reformatted images are provided for review. Dose modulation, iterative reconstruction, and/or weight based adjustment of the mA/kV was utilized to reduce the radiation dose to as low as reasonably achievable. COMPARISON: None. HISTORY: ORDERING SYSTEM PROVIDED HISTORY: abd pain TECHNOLOGIST PROVIDED HISTORY: Reason for exam:->abd pain Additional Contrast?->None Decision Support Exception - unselect if not a suspected or confirmed emergency medical condition->Emergency Medical Condition (MA) Reason for Exam: abd pain Relevant Medical/Surgical History: Other (Patient has missed dialysis txs, states is in pain and wants meds, pt has trach. ) FINDINGS: Lower Chest: Calcified granulomatous changes are identified. No focal consolidation or pleural effusion is identified. No pericardial effusion. No distal esophageal thickening. Organs: No hypodense mass identified in the liver or spleen. No adrenal mass. No pancreatic mass. No peripancreatic inflammatory process. The gallbladder is unremarkable. Numerous cysts are identified within the kidneys bilaterally.   Many of the cysts are simple fluid in density, however several are also not consistent with simple cysts. There is a 40 Hounsfield unit lesion in the lower pole the left kidney measuring 3.6 cm in size. GI/Bowel: Rectosigmoid distention is seen with a small to moderate volume of stool noted. No significant stool volume elsewhere within the colon. Minimal colonic diverticulosis is identified. Mildly dilated loops of small bowel seen in the left lower quadrant, with a focal transition noted posterior to a hernia mesh and appreciated on and around axial image [id number]. There is a small bowel feces sign noted just proximal to this transition point as well. Pelvis: No pelvic mass. Masslike appearance within the uterus. Calcified fibroids are noted. No free fluid. Peritoneum/Retroperitoneum: No aortic aneurysm is identified. No retroperitoneal or mesenteric bulky lymphadenopathy. There is a omental fat containing hernia seen just above a prior anterior hernia repair. Bones/Soft Tissues: No acute subcutaneous soft tissue abnormality is identified. Diffuse osteopenia. Degenerative changes are seen in the spine and sacroiliac joints. No osseous destructive process is identified. 1. There is a partial small bowel obstruction, with a discrete transition point noted adjacent to hernia mesh within the anterior lower abdomen. Just proximal to the transition point, a small bowel feces sign is seen related to delayed transit. This is seen on and around axial image [id number]. 2. Mild to moderate stool volume noted with rectosigmoid colonic distention. No functional obstruction related to this however. 3. Several renal cysts are seen bilaterally, many of which appearing normal in density. However there are some more hyperdense lesions, including a 3.6 cm left lower pole lesion measuring 40 Hounsfield units.   These would be best characterized with dedicated MRI of the kidneys per renal mass protocol to exclude the possibility of a renal neoplasm. 4. Masslike appearance within the uterus suggestive of underlying uterine leiomyomas, as noted on recent CT imaging of the pelvis on 03/03/2022. XR ANKLE RIGHT (MIN 3 VIEWS)    Result Date: 4/26/2022  EXAMINATION: THREE XRAY VIEWS OF THE RIGHT ANKLE 4/26/2022 8:54 pm COMPARISON: None. HISTORY: ORDERING SYSTEM PROVIDED HISTORY: pain TECHNOLOGIST PROVIDED HISTORY: please include foot Reason for exam:->pain Reason for Exam: Foot and ankle pain. No known injury. Best images possible as patient would not follow commands. FINDINGS: Severe osteopenia limits evaluation. There are moderate to severe degenerative changes with narrowing of the joint space and osteophytosis. No gross fracture or dislocation. Extensive soft tissue vascular calcifications. No acute fracture identified     XR ANKLE RIGHT (MIN 3 VIEWS)    Result Date: 4/26/2022  EXAMINATION: THREE XRAY VIEWS OF THE RIGHT ANKLE 4/26/2022 1:07 pm COMPARISON: None. HISTORY: ORDERING SYSTEM PROVIDED HISTORY: swelling TECHNOLOGIST PROVIDED HISTORY: Reason for exam:->swelling Reason for Exam: ankle pain, swelling FINDINGS: AP lateral and lateral oblique views of the right ankle reveal diffuse osteopenia without acute displaced fracture however degenerative changes at the tibiotalar joint space and within the midfoot however no acute displaced fracture     No acute displaced fracture with grossly anatomic alignment however advanced degenerative changes of the tibiotalar joint space and osteopenia limiting evaluation for subtle cortical defect     XR FOOT RIGHT (2 VIEWS)    Result Date: 4/26/2022  EXAMINATION: 2 XRAY VIEWS OF THE RIGHT FOOT 4/26/2022 8:54 pm COMPARISON: None. HISTORY: ORDERING SYSTEM PROVIDED HISTORY: pain TECHNOLOGIST PROVIDED HISTORY: Reason for exam:->pain Reason for Exam: Foot and ankle pain. No known injury. Best images possible as patient would not follow commands. FINDINGS: There is no evidence of acute fracture.   There is normal alignment of the tarsometatarsal joints. No acute joint abnormality. No focal osseous lesion. No focal soft tissue abnormality. Diffuse osteopenia. No acute osseous abnormality. US PELVIS COMPLETE    Result Date: 4/27/2022  EXAMINATION: PELVIC ULTRASOUND 4/27/2022 TECHNIQUE: Transabdominal pelvic ultrasound was performed. COMPARISON: None HISTORY: ORDERING SYSTEM PROVIDED HISTORY: postmenopausal vaginal bleeding on anticoagulation TECHNOLOGIST PROVIDED HISTORY: Reason for exam:->postmenopausal vaginal bleeding on anticoagulation Reason for exam:->known fibroids FINDINGS: Measurements: Not able for visualization Ultrasound Findings: Nonvisualization     Nondiagnostic evaluation. XR CHEST PORTABLE    Result Date: 5/19/2022  EXAMINATION: ONE XRAY VIEW OF THE CHEST 5/19/2022 10:25 am COMPARISON: 05/16/2022. HISTORY: ORDERING SYSTEM PROVIDED HISTORY: leukocytosis TECHNOLOGIST PROVIDED HISTORY: Reason for exam:->leukocytosis Reason for Exam: Leukocytosis FINDINGS: The cardiac silhouette is enlarged and stable. Lung volumes are mildly decreased with resultant vascular crowding towards the lung bases. No acute infiltrate, significant pleural fluid or evidence of overt failure. Tracheostomy appliance, enteric catheter extending into the proximal gastric body and left IJ catheter remain in place. Mild decreased lung volumes. No acute cardiopulmonary disease. XR CHEST PORTABLE    Result Date: 5/16/2022  EXAMINATION: ONE XRAY VIEW OF THE CHEST 5/16/2022 3:00 pm COMPARISON: May 10, 2022 HISTORY: ORDERING SYSTEM PROVIDED HISTORY: post op TECHNOLOGIST PROVIDED HISTORY: Reason for exam:->post op Reason for Exam: Post op FINDINGS: The cardiac silhouette is enlarged. No pleural effusion or pneumothorax. Tracheostomy tube projecting near the mid clavicular heads. Left central venous catheter projects over the SVC.   There is a bend in the proximal aspect of the catheter which could be external to the patient. Mild interstitial prominence favoring mild pulmonary vascular congestion. Enteric tube projects below the left hemidiaphragm likely in the proximal to mid stomach. Mild increased pulmonary vascular congestion. XR CHEST PORTABLE    Result Date: 5/10/2022  EXAMINATION: ONE XRAY VIEW OF THE CHEST 5/10/2022 7:16 pm COMPARISON: None. HISTORY: ORDERING SYSTEM PROVIDED HISTORY: central line placement TECHNOLOGIST PROVIDED HISTORY: Reason for exam:->central line placement Reason for Exam: central line placement FINDINGS: Tracheostomy in place. Esophagogastric tube coursing below the diaphragm and elevated view. Left internal jugular central venous catheter terminates within the mid SVC. No postprocedure pneumothorax     Left internal jugular central venous catheter terminates within the mid SVC. No postprocedure pneumothorax     XR CHEST PORTABLE    Result Date: 4/30/2022  EXAMINATION: ONE XRAY VIEW OF THE CHEST 4/30/2022 1:47 pm COMPARISON: 04/26/2022 HISTORY: ORDERING SYSTEM PROVIDED HISTORY: dislodged trach; mucous plugging TECHNOLOGIST PROVIDED HISTORY: Reason for exam:->dislodged trach; mucous plugging Reason for Exam: Dislodged trach; mucous plugging FINDINGS: Tracheostomy tube remains in place. The lungs are without acute focal process. There is no effusion or pneumothorax. The cardiomediastinal silhouette is stable. The osseous structures are stable. No acute process. Bibasilar hypoaeration     XR CHEST PORTABLE    Result Date: 4/26/2022  EXAMINATION: ONE XRAY VIEW OF THE CHEST 4/26/2022 3:15 pm COMPARISON: Chest x-ray dated 02/21/2022. HISTORY: ORDERING SYSTEM PROVIDED HISTORY: wbc TECHNOLOGIST PROVIDED HISTORY: Reason for exam:->wbc Reason for Exam: elevated white blood count FINDINGS: LINES/TUBES/OTHER: Tracheostomy tube is again noted. HEART/MEDIASTINUM: The cardiac silhouette is enlarged, but stable. PLEURA/LUNGS: There are no focal consolidations or pleural effusions.  There is no appreciable pneumothorax. BONES/SOFT TISSUE: No acute abnormality. No radiographic evidence of acute pulmonary disease. XR ABDOMEN FOR NG/OG/NE TUBE PLACEMENT    Result Date: 5/15/2022  EXAM: XR Abdomen, 1 View EXAM DATE/TIME: 5/15/2022 12:59 pm CLINICAL HISTORY: ORDERING SYSTEM PROVIDED  Eval bowel gas pattern and NGT placement--PORTABLE TECHNOLOGIST PROVIDED HISTORY:  Reason for exam:->Eval bowel gas pattern and NGT placement--PORTABLE portable? ->Yes Reason for Exam: ng placement TECHNIQUE: Frontal supine view of the abdomen/pelvis. COMPARISON: 05/09/2022 FINDINGS: Gastrointestinal tract:  Only the upper abdomen is included on this study but what can be seen of the bowel gas pattern suggest persistent small bowel dilation suggesting a degree of obstruction though possibly decreased in caliber from the prior study. Bones/joints:  No acute findings. Tubes, lines and devices:  NG tube extends into the body of the stomach. Tracheostomy tube in place. Left internal jugular central venous line with the tip in the upper SVC. 1.  NG tube extends into the body of the stomach. 2.  Only the upper abdomen is included on this study but what can be seen of the bowel gas pattern suggest persistent small bowel dilation suggesting a degree of obstruction though possibly decreased in caliber from the prior study. XR ABDOMEN FOR NG/OG/NE TUBE PLACEMENT    Result Date: 5/7/2022  EXAMINATION: ONE SUPINE XRAY VIEW(S) OF THE ABDOMEN 5/6/2022 10:06 pm COMPARISON: 05/06/2022 HISTORY: ORDERING SYSTEM PROVIDED HISTORY: Confirmation of course of NG/OG/NE tube and location of tip of tube TECHNOLOGIST PROVIDED HISTORY: Reason for exam:->Confirmation of course of NG/OG/NE tube and location of tip of tube Portable? ->Yes FINDINGS: Nasogastric tube is coursing into the stomach with the side port just below the level of the diaphragm. Could be advanced by 5 cm. Tracheostomy is also redemonstrated.  The gas distended small bowel loops in the upper abdomen. Cardiac silhouette is mildly enlarged with some mild central vascular congestion. NG tube courses into the stomach but can be advanced by 5 cm for better positioning. Gas distended small bowel loops in the upper abdomen. XR ABDOMEN FOR NG/OG/NE TUBE PLACEMENT    Result Date: 5/6/2022  EXAMINATION: ONE SUPINE XRAY VIEW(S) OF THE ABDOMEN 5/6/2022 6:55 am COMPARISON: None. HISTORY: ORDERING SYSTEM PROVIDED HISTORY: NG Tube Placement TECHNOLOGIST PROVIDED HISTORY: Reason for exam:->NG Tube Placement Portable? ->Yes Reason for Exam: NG Tube Placement FINDINGS: Exam is limited by patient body habitus and patient positioning Tip of ET tube projects at the level of the midthoracic trachea NG tube is seen. The tip appears to terminate in the region of the stomach Heart is enlarged. Tip of NG tube projects in region of stomach     FL SMALL BOWEL FOLLOW THROUGH ONLY    Result Date: 5/10/2022  EXAMINATION: SMALL BOWEL FOLLOW THROUGH SERIES 5/9/2022 TECHNIQUE: Small bowel follow through series was performed with overhead images and spot images. FLUOROSCOPY DOSE AND TYPE OR TIME AND EXPOSURES: 0 COMPARISON: 05/07/2022 HISTORY: ORDERING SYSTEM PROVIDED HISTORY: water soluble contrast through NG, eval SBO TECHNOLOGIST PROVIDED HISTORY: Reason for exam:->water soluble contrast through NG, eval SBO Reason for Exam: eval SBO FINDINGS: There worsening dilated loops of small bowel throughout the abdomen likely due to a partial small bowel obstruction. The nasogastric tube terminates in gastric fundus. Degenerative changes involve the lumbar spine and bilateral hips. The bones are demineralized. The contrast never reaches the cecum even after 18 hours. 1. Worsening partial small bowel obstruction. XR ABDOMEN (2 VIEWS)    Result Date: 5/7/2022  EXAMINATION: TWO XRAY VIEWS OF THE ABDOMEN 5/7/2022 11:45 am COMPARISON: 05/06/2022.  HISTORY: ORDERING SYSTEM PROVIDED HISTORY: follow up sbo TECHNOLOGIST PROVIDED HISTORY: Reason for exam:->follow up sbo Reason for Exam: sbo FINDINGS: Evaluation is limited by patient's body habitus. Multiple dilated small bowel loops in the upper abdomen with gradual transition in the mid and lower abdomen. Bowel loops measure up to 4.5 cm in diameter. Enteric catheter is coiled in the proximal stomach. Scattered gas and stool is present within the colon. Persistent dilated small bowel loops consistent with a mid small bowel obstruction, likely partial or intermittent. The patient was seen and examined on day of discharge and this discharge summary is in conjunction with any daily progress note from day of discharge. Time Spent on discharge is 35 minutes  in the examination, evaluation, counseling and review of medications and discharge plan. Note that greater than 30 minutes was spent in preparing discharge papers, discussing discharge with patient, medication review, etc.       Signed:    Ethan Noland MD   5/21/2022      Thank you Pat Armas MD for the opportunity to be involved in this patient's care.  If you have any questions or concerns please feel free to contact me at [phone]

## 2022-05-23 LAB — BLOOD CULTURE, ROUTINE: NORMAL

## 2022-07-27 NOTE — PROGRESS NOTES
Alejandro Fair  Male, 53 year old, 1969    MRN:   [id number]   RE: Reschedule 7/27, Dr. Givens  Received: Today  Bessy Bonilla  8/5/22 @ 11:30 arrival time is 10:30         Previous Messages      Case Information    Patient: Alejandro Fair [[id number]]   Case:  [id number]        Case Management Discharge Note    Final Note: Spoke with Keshawn at [facility] and confirmed pt can return to the facility today.  RN to call report to [phone].   faxed DC summary.  [facility] to transport today at 1400.    Destination - Selection Complete      Service Provider Request Status Selected Services Address Phone Number Fax Number    [facility] [address] , [facility] [address] [phone]      Durable Medical Equipment      No service has been selected for the patient.      Dialysis/Infusion      No service has been selected for the patient.      Home Medical Care      No service has been selected for the patient.      Therapy      No service has been selected for the patient.      Community Resources      No service has been selected for the patient.        Transportation Services  Ambulance: [facility]    Final Discharge Disposition Code: 04 - intermediate care facility

## 2022-09-15 ENCOUNTER — APPOINTMENT (OUTPATIENT)
Dept: GENERAL RADIOLOGY | Age: 72
End: 2022-09-15
Payer: COMMERCIAL

## 2022-09-15 ENCOUNTER — HOSPITAL ENCOUNTER (OUTPATIENT)
Age: 72
Setting detail: OBSERVATION
Discharge: HOME | End: 2022-09-19
Attending: EMERGENCY MEDICINE | Admitting: INTERNAL MEDICINE
Payer: COMMERCIAL

## 2022-09-15 DIAGNOSIS — Z99.2 ESRD ON HEMODIALYSIS (HCC): ICD-10-CM

## 2022-09-15 DIAGNOSIS — N18.6 ESRD ON HEMODIALYSIS (HCC): ICD-10-CM

## 2022-09-15 DIAGNOSIS — N18.6 END STAGE RENAL DISEASE (HCC): ICD-10-CM

## 2022-09-15 DIAGNOSIS — R07.9 CHEST PAIN, UNSPECIFIED TYPE: Primary | ICD-10-CM

## 2022-09-15 DIAGNOSIS — K62.89 RECTAL PAIN: ICD-10-CM

## 2022-09-15 LAB
ANION GAP SERPL CALCULATED.3IONS-SCNC: 13 MMOL/L (ref 3–16)
BASOPHILS ABSOLUTE: 0.1 K/UL (ref 0–0.2)
BASOPHILS RELATIVE PERCENT: 1 %
BUN BLDV-MCNC: 41 MG/DL (ref 7–20)
CALCIUM SERPL-MCNC: 9.3 MG/DL (ref 8.3–10.6)
CHLORIDE BLD-SCNC: 94 MMOL/L (ref 99–110)
CO2: 29 MMOL/L (ref 21–32)
CREAT SERPL-MCNC: 4.2 MG/DL (ref 0.6–1.2)
EKG ATRIAL RATE: 85 BPM
EKG DIAGNOSIS: NORMAL
EKG P AXIS: 47 DEGREES
EKG P-R INTERVAL: 252 MS
EKG Q-T INTERVAL: 386 MS
EKG QRS DURATION: 78 MS
EKG QTC CALCULATION (BAZETT): 459 MS
EKG R AXIS: 1 DEGREES
EKG T AXIS: 22 DEGREES
EKG VENTRICULAR RATE: 85 BPM
EOSINOPHILS ABSOLUTE: 0.1 K/UL (ref 0–0.6)
EOSINOPHILS RELATIVE PERCENT: 0.8 %
GFR AFRICAN AMERICAN: 13
GFR NON-AFRICAN AMERICAN: 10
GLUCOSE BLD-MCNC: 104 MG/DL (ref 70–99)
GLUCOSE BLD-MCNC: 144 MG/DL (ref 70–99)
HCT VFR BLD CALC: 39.1 % (ref 36–48)
HEMOGLOBIN: 12.3 G/DL (ref 12–16)
LYMPHOCYTES ABSOLUTE: 0.6 K/UL (ref 1–5.1)
LYMPHOCYTES RELATIVE PERCENT: 7.2 %
MCH RBC QN AUTO: 29.8 PG (ref 26–34)
MCHC RBC AUTO-ENTMCNC: 31.5 G/DL (ref 31–36)
MCV RBC AUTO: 94.7 FL (ref 80–100)
MONOCYTES ABSOLUTE: 0.5 K/UL (ref 0–1.3)
MONOCYTES RELATIVE PERCENT: 5.9 %
NEUTROPHILS ABSOLUTE: 7.7 K/UL (ref 1.7–7.7)
NEUTROPHILS RELATIVE PERCENT: 85.1 %
PDW BLD-RTO: 18.5 % (ref 12.4–15.4)
PERFORMED ON: ABNORMAL
PLATELET # BLD: 238 K/UL (ref 135–450)
PMV BLD AUTO: 7.4 FL (ref 5–10.5)
POTASSIUM SERPL-SCNC: 4.9 MMOL/L (ref 3.5–5.1)
RBC # BLD: 4.13 M/UL (ref 4–5.2)
SARS-COV-2, NAAT: NOT DETECTED
SODIUM BLD-SCNC: 136 MMOL/L (ref 136–145)
TROPONIN: 0.21 NG/ML
TROPONIN: 0.24 NG/ML
WBC # BLD: 9 K/UL (ref 4–11)

## 2022-09-15 PROCEDURE — 87350 HEPATITIS BE AG IA: CPT

## 2022-09-15 PROCEDURE — 86707 HEPATITIS BE ANTIBODY: CPT

## 2022-09-15 PROCEDURE — 94640 AIRWAY INHALATION TREATMENT: CPT

## 2022-09-15 PROCEDURE — 86704 HEP B CORE ANTIBODY TOTAL: CPT

## 2022-09-15 PROCEDURE — 6360000002 HC RX W HCPCS

## 2022-09-15 PROCEDURE — 84484 ASSAY OF TROPONIN QUANT: CPT

## 2022-09-15 PROCEDURE — 93005 ELECTROCARDIOGRAM TRACING: CPT | Performed by: EMERGENCY MEDICINE

## 2022-09-15 PROCEDURE — 6370000000 HC RX 637 (ALT 250 FOR IP): Performed by: NURSE PRACTITIONER

## 2022-09-15 PROCEDURE — 87635 SARS-COV-2 COVID-19 AMP PRB: CPT

## 2022-09-15 PROCEDURE — 85025 COMPLETE CBC W/AUTO DIFF WBC: CPT

## 2022-09-15 PROCEDURE — 80048 BASIC METABOLIC PNL TOTAL CA: CPT

## 2022-09-15 PROCEDURE — 2700000000 HC OXYGEN THERAPY PER DAY

## 2022-09-15 PROCEDURE — 36415 COLL VENOUS BLD VENIPUNCTURE: CPT

## 2022-09-15 PROCEDURE — 71045 X-RAY EXAM CHEST 1 VIEW: CPT

## 2022-09-15 PROCEDURE — 99285 EMERGENCY DEPT VISIT HI MDM: CPT

## 2022-09-15 PROCEDURE — G0378 HOSPITAL OBSERVATION PER HR: HCPCS

## 2022-09-15 PROCEDURE — 94761 N-INVAS EAR/PLS OXIMETRY MLT: CPT

## 2022-09-15 PROCEDURE — 6370000000 HC RX 637 (ALT 250 FOR IP): Performed by: EMERGENCY MEDICINE

## 2022-09-15 PROCEDURE — 96374 THER/PROPH/DIAG INJ IV PUSH: CPT

## 2022-09-15 PROCEDURE — 96375 TX/PRO/DX INJ NEW DRUG ADDON: CPT

## 2022-09-15 RX ORDER — OXYCODONE HYDROCHLORIDE 5 MG/1
5 TABLET ORAL 2 TIMES DAILY
Status: DISCONTINUED | OUTPATIENT
Start: 2022-09-16 | End: 2022-09-19 | Stop reason: HOSPADM

## 2022-09-15 RX ORDER — SODIUM CHLORIDE 0.9 % (FLUSH) 0.9 %
5-40 SYRINGE (ML) INJECTION PRN
Status: DISCONTINUED | OUTPATIENT
Start: 2022-09-15 | End: 2022-09-19 | Stop reason: HOSPADM

## 2022-09-15 RX ORDER — OXYCODONE HYDROCHLORIDE 5 MG/1
5 CAPSULE ORAL 2 TIMES DAILY
Status: ON HOLD | COMMUNITY
End: 2022-09-19 | Stop reason: SDUPTHER

## 2022-09-15 RX ORDER — ACETAMINOPHEN 325 MG/1
650 TABLET ORAL EVERY 6 HOURS PRN
Status: DISCONTINUED | OUTPATIENT
Start: 2022-09-15 | End: 2022-09-19 | Stop reason: HOSPADM

## 2022-09-15 RX ORDER — MIDODRINE HYDROCHLORIDE 10 MG/1
10 TABLET ORAL
Status: DISCONTINUED | OUTPATIENT
Start: 2022-09-16 | End: 2022-09-19 | Stop reason: HOSPADM

## 2022-09-15 RX ORDER — ONDANSETRON 2 MG/ML
INJECTION INTRAMUSCULAR; INTRAVENOUS
Status: COMPLETED
Start: 2022-09-15 | End: 2022-09-15

## 2022-09-15 RX ORDER — ONDANSETRON 2 MG/ML
4 INJECTION INTRAMUSCULAR; INTRAVENOUS EVERY 6 HOURS PRN
Status: DISCONTINUED | OUTPATIENT
Start: 2022-09-15 | End: 2022-09-19 | Stop reason: HOSPADM

## 2022-09-15 RX ORDER — LORAZEPAM 0.5 MG/1
0.5 TABLET ORAL EVERY 12 HOURS PRN
Status: ON HOLD | COMMUNITY
End: 2022-09-19 | Stop reason: SDUPTHER

## 2022-09-15 RX ORDER — CALCIUM ACETATE 667 MG/1
1334 CAPSULE ORAL
Status: DISCONTINUED | OUTPATIENT
Start: 2022-09-16 | End: 2022-09-19 | Stop reason: HOSPADM

## 2022-09-15 RX ORDER — ATORVASTATIN CALCIUM 40 MG/1
80 TABLET, FILM COATED ORAL NIGHTLY
Status: DISCONTINUED | OUTPATIENT
Start: 2022-09-15 | End: 2022-09-19 | Stop reason: HOSPADM

## 2022-09-15 RX ORDER — TRAZODONE HYDROCHLORIDE 50 MG/1
50 TABLET ORAL NIGHTLY
COMMUNITY

## 2022-09-15 RX ORDER — POTASSIUM CHLORIDE 7.45 MG/ML
10 INJECTION INTRAVENOUS PRN
Status: DISCONTINUED | OUTPATIENT
Start: 2022-09-15 | End: 2022-09-15

## 2022-09-15 RX ORDER — ASPIRIN 81 MG/1
81 TABLET ORAL DAILY
Status: DISCONTINUED | OUTPATIENT
Start: 2022-09-16 | End: 2022-09-19 | Stop reason: HOSPADM

## 2022-09-15 RX ORDER — DIPHENHYDRAMINE HCL 25 MG
12.5 TABLET ORAL ONCE
Status: COMPLETED | OUTPATIENT
Start: 2022-09-15 | End: 2022-09-15

## 2022-09-15 RX ORDER — TRAZODONE HYDROCHLORIDE 50 MG/1
50 TABLET ORAL NIGHTLY
Status: DISCONTINUED | OUTPATIENT
Start: 2022-09-15 | End: 2022-09-19 | Stop reason: HOSPADM

## 2022-09-15 RX ORDER — SODIUM CHLORIDE 0.9 % (FLUSH) 0.9 %
5-40 SYRINGE (ML) INJECTION EVERY 12 HOURS SCHEDULED
Status: DISCONTINUED | OUTPATIENT
Start: 2022-09-15 | End: 2022-09-19 | Stop reason: HOSPADM

## 2022-09-15 RX ORDER — ONDANSETRON 4 MG/1
4 TABLET, ORALLY DISINTEGRATING ORAL EVERY 8 HOURS PRN
Status: DISCONTINUED | OUTPATIENT
Start: 2022-09-15 | End: 2022-09-19 | Stop reason: HOSPADM

## 2022-09-15 RX ORDER — SODIUM CHLORIDE 9 MG/ML
INJECTION, SOLUTION INTRAVENOUS PRN
Status: DISCONTINUED | OUTPATIENT
Start: 2022-09-15 | End: 2022-09-19 | Stop reason: HOSPADM

## 2022-09-15 RX ORDER — CHLORHEXIDINE GLUCONATE 0.12 MG/ML
15 RINSE ORAL 2 TIMES DAILY
Status: DISCONTINUED | OUTPATIENT
Start: 2022-09-15 | End: 2022-09-19 | Stop reason: HOSPADM

## 2022-09-15 RX ORDER — HYDROCORTISONE 25 MG/G
CREAM TOPICAL 2 TIMES DAILY
Status: DISCONTINUED | OUTPATIENT
Start: 2022-09-15 | End: 2022-09-19 | Stop reason: HOSPADM

## 2022-09-15 RX ORDER — ACETAMINOPHEN 650 MG/1
650 SUPPOSITORY RECTAL EVERY 6 HOURS PRN
Status: DISCONTINUED | OUTPATIENT
Start: 2022-09-15 | End: 2022-09-19 | Stop reason: HOSPADM

## 2022-09-15 RX ORDER — PANTOPRAZOLE SODIUM 40 MG/1
40 TABLET, DELAYED RELEASE ORAL
Status: DISCONTINUED | OUTPATIENT
Start: 2022-09-16 | End: 2022-09-19 | Stop reason: HOSPADM

## 2022-09-15 RX ORDER — PROMETHAZINE HYDROCHLORIDE 12.5 MG/1
12.5 TABLET ORAL EVERY 6 HOURS PRN
COMMUNITY

## 2022-09-15 RX ORDER — LORAZEPAM 0.5 MG/1
0.5 TABLET ORAL 3 TIMES DAILY
Status: ON HOLD | COMMUNITY
End: 2022-09-19 | Stop reason: HOSPADM

## 2022-09-15 RX ORDER — CHOLECALCIFEROL (VITAMIN D3) 125 MCG
500 CAPSULE ORAL DAILY
COMMUNITY

## 2022-09-15 RX ORDER — IPRATROPIUM BROMIDE AND ALBUTEROL SULFATE 2.5; .5 MG/3ML; MG/3ML
1 SOLUTION RESPIRATORY (INHALATION) 4 TIMES DAILY
Status: DISCONTINUED | OUTPATIENT
Start: 2022-09-16 | End: 2022-09-19 | Stop reason: HOSPADM

## 2022-09-15 RX ORDER — POLYETHYLENE GLYCOL 3350 17 G/17G
17 POWDER, FOR SOLUTION ORAL DAILY PRN
Status: DISCONTINUED | OUTPATIENT
Start: 2022-09-15 | End: 2022-09-19 | Stop reason: HOSPADM

## 2022-09-15 RX ORDER — IPRATROPIUM BROMIDE AND ALBUTEROL SULFATE 2.5; .5 MG/3ML; MG/3ML
1 SOLUTION RESPIRATORY (INHALATION)
Status: DISCONTINUED | OUTPATIENT
Start: 2022-09-16 | End: 2022-09-15

## 2022-09-15 RX ORDER — POTASSIUM CHLORIDE 20 MEQ/1
40 TABLET, EXTENDED RELEASE ORAL PRN
Status: DISCONTINUED | OUTPATIENT
Start: 2022-09-15 | End: 2022-09-15

## 2022-09-15 RX ORDER — OXYCODONE HYDROCHLORIDE 5 MG/1
5 TABLET ORAL ONCE
Status: COMPLETED | OUTPATIENT
Start: 2022-09-15 | End: 2022-09-15

## 2022-09-15 RX ORDER — CHOLECALCIFEROL (VITAMIN D3) 125 MCG
500 CAPSULE ORAL DAILY
Status: DISCONTINUED | OUTPATIENT
Start: 2022-09-16 | End: 2022-09-19 | Stop reason: HOSPADM

## 2022-09-15 RX ORDER — ONDANSETRON 2 MG/ML
4 INJECTION INTRAMUSCULAR; INTRAVENOUS EVERY 6 HOURS PRN
Status: DISCONTINUED | OUTPATIENT
Start: 2022-09-15 | End: 2022-09-15

## 2022-09-15 RX ORDER — LOPERAMIDE HYDROCHLORIDE 2 MG/1
2 CAPSULE ORAL 4 TIMES DAILY PRN
COMMUNITY

## 2022-09-15 RX ORDER — LORAZEPAM 0.5 MG/1
0.5 TABLET ORAL EVERY 12 HOURS PRN
Status: DISCONTINUED | OUTPATIENT
Start: 2022-09-15 | End: 2022-09-19 | Stop reason: HOSPADM

## 2022-09-15 RX ADMIN — DIPHENHYDRAMINE HCL 12.5 MG: 25 TABLET ORAL at 15:39

## 2022-09-15 RX ADMIN — ONDANSETRON HYDROCHLORIDE 4 MG: 2 INJECTION, SOLUTION INTRAMUSCULAR; INTRAVENOUS at 12:05

## 2022-09-15 RX ADMIN — IPRATROPIUM BROMIDE AND ALBUTEROL SULFATE 3 ML: 2.5; .5 SOLUTION RESPIRATORY (INHALATION) at 23:31

## 2022-09-15 RX ADMIN — ONDANSETRON 4 MG: 2 INJECTION INTRAMUSCULAR; INTRAVENOUS at 12:05

## 2022-09-15 RX ADMIN — OXYCODONE 5 MG: 5 TABLET ORAL at 13:23

## 2022-09-15 ASSESSMENT — PAIN SCALES - GENERAL
PAINLEVEL_OUTOF10: 6
PAINLEVEL_OUTOF10: 10
PAINLEVEL_OUTOF10: 10

## 2022-09-15 ASSESSMENT — PAIN DESCRIPTION - LOCATION
LOCATION: RECTUM
LOCATION: RECTUM

## 2022-09-15 ASSESSMENT — PAIN DESCRIPTION - DESCRIPTORS: DESCRIPTORS: OTHER (COMMENT)

## 2022-09-15 NOTE — ED NOTES
12- Perfect Serve sent to Dr. Evangelist Arredondo for consult.    1533-Mitzi JULIAN called back and spoke with Dr. Reyna Sandhu  09/15/22 [address] [place] [address]  09/15/22 [id number]

## 2022-09-15 NOTE — ED NOTES
12 Lead EKG complete upon pt. Arrival and given to Dr. Tio Zafar for review. Pt. Efe Whatley on cardiac monitor at this time.       Felicita Luna RN  09/15/22 [id number]

## 2022-09-15 NOTE — ED NOTES
Pt. Reports nausea at this time.   Verbal order obtained for 4 mg IV zofran per Dr. Tatiana Ignacio, RN  09/15/22 [id number]

## 2022-09-15 NOTE — ED NOTES
Pt. Shira Naranjo at this time with Provider at bedside to do rectal exam.      Felicita Luna RN  09/15/22 [id number]

## 2022-09-16 ENCOUNTER — APPOINTMENT (OUTPATIENT)
Dept: VASCULAR LAB | Age: 72
End: 2022-09-16
Payer: COMMERCIAL

## 2022-09-16 ENCOUNTER — APPOINTMENT (OUTPATIENT)
Dept: NUCLEAR MEDICINE | Age: 72
End: 2022-09-16
Payer: COMMERCIAL

## 2022-09-16 LAB
A/G RATIO: 0.9 (ref 1.1–2.2)
ALBUMIN SERPL-MCNC: 3.3 G/DL (ref 3.4–5)
ALP BLD-CCNC: 100 U/L (ref 40–129)
ALT SERPL-CCNC: 8 U/L (ref 10–40)
ANION GAP SERPL CALCULATED.3IONS-SCNC: 12 MMOL/L (ref 3–16)
AST SERPL-CCNC: 8 U/L (ref 15–37)
BILIRUB SERPL-MCNC: 0.6 MG/DL (ref 0–1)
BUN BLDV-MCNC: 55 MG/DL (ref 7–20)
CALCIUM SERPL-MCNC: 8.8 MG/DL (ref 8.3–10.6)
CHLORIDE BLD-SCNC: 96 MMOL/L (ref 99–110)
CHOLESTEROL, TOTAL: 85 MG/DL (ref 0–199)
CO2: 28 MMOL/L (ref 21–32)
CREAT SERPL-MCNC: 5.1 MG/DL (ref 0.6–1.2)
EKG ATRIAL RATE: 72 BPM
EKG DIAGNOSIS: NORMAL
EKG P AXIS: 62 DEGREES
EKG P-R INTERVAL: 244 MS
EKG Q-T INTERVAL: 428 MS
EKG QRS DURATION: 78 MS
EKG QTC CALCULATION (BAZETT): 468 MS
EKG R AXIS: -11 DEGREES
EKG T AXIS: 23 DEGREES
EKG VENTRICULAR RATE: 72 BPM
GFR AFRICAN AMERICAN: 10
GFR NON-AFRICAN AMERICAN: 8
GLUCOSE BLD-MCNC: 128 MG/DL (ref 70–99)
GLUCOSE BLD-MCNC: 152 MG/DL (ref 70–99)
GLUCOSE BLD-MCNC: 85 MG/DL (ref 70–99)
GLUCOSE BLD-MCNC: 95 MG/DL (ref 70–99)
HCT VFR BLD CALC: 32.6 % (ref 36–48)
HDLC SERPL-MCNC: 23 MG/DL (ref 40–60)
HEMOGLOBIN: 10.3 G/DL (ref 12–16)
LDL CHOLESTEROL CALCULATED: 34 MG/DL
MCH RBC QN AUTO: 29.6 PG (ref 26–34)
MCHC RBC AUTO-ENTMCNC: 31.5 G/DL (ref 31–36)
MCV RBC AUTO: 93.9 FL (ref 80–100)
PDW BLD-RTO: 17.8 % (ref 12.4–15.4)
PERFORMED ON: ABNORMAL
PERFORMED ON: NORMAL
PERFORMED ON: NORMAL
PLATELET # BLD: 196 K/UL (ref 135–450)
PMV BLD AUTO: 8.1 FL (ref 5–10.5)
POTASSIUM REFLEX MAGNESIUM: 5.1 MMOL/L (ref 3.5–5.1)
RBC # BLD: 3.47 M/UL (ref 4–5.2)
SODIUM BLD-SCNC: 136 MMOL/L (ref 136–145)
TOTAL PROTEIN: 7 G/DL (ref 6.4–8.2)
TRIGL SERPL-MCNC: 140 MG/DL (ref 0–150)
TROPONIN: 0.23 NG/ML
TROPONIN: 0.24 NG/ML
VLDLC SERPL CALC-MCNC: 28 MG/DL
WBC # BLD: 7.2 K/UL (ref 4–11)

## 2022-09-16 PROCEDURE — 6370000000 HC RX 637 (ALT 250 FOR IP): Performed by: NURSE PRACTITIONER

## 2022-09-16 PROCEDURE — G0378 HOSPITAL OBSERVATION PER HR: HCPCS

## 2022-09-16 PROCEDURE — 94761 N-INVAS EAR/PLS OXIMETRY MLT: CPT

## 2022-09-16 PROCEDURE — 85027 COMPLETE CBC AUTOMATED: CPT

## 2022-09-16 PROCEDURE — 94640 AIRWAY INHALATION TREATMENT: CPT

## 2022-09-16 PROCEDURE — 96376 TX/PRO/DX INJ SAME DRUG ADON: CPT

## 2022-09-16 PROCEDURE — 36415 COLL VENOUS BLD VENIPUNCTURE: CPT

## 2022-09-16 PROCEDURE — 2580000003 HC RX 258: Performed by: NURSE PRACTITIONER

## 2022-09-16 PROCEDURE — 93971 EXTREMITY STUDY: CPT

## 2022-09-16 PROCEDURE — 80053 COMPREHEN METABOLIC PANEL: CPT

## 2022-09-16 PROCEDURE — 80061 LIPID PANEL: CPT

## 2022-09-16 PROCEDURE — 99223 1ST HOSP IP/OBS HIGH 75: CPT

## 2022-09-16 PROCEDURE — 6370000000 HC RX 637 (ALT 250 FOR IP): Performed by: INTERNAL MEDICINE

## 2022-09-16 PROCEDURE — 6360000002 HC RX W HCPCS: Performed by: NURSE PRACTITIONER

## 2022-09-16 PROCEDURE — 93005 ELECTROCARDIOGRAM TRACING: CPT | Performed by: NURSE PRACTITIONER

## 2022-09-16 PROCEDURE — 2700000000 HC OXYGEN THERAPY PER DAY

## 2022-09-16 PROCEDURE — 84484 ASSAY OF TROPONIN QUANT: CPT

## 2022-09-16 RX ORDER — INSULIN LISPRO 100 [IU]/ML
0-4 INJECTION, SOLUTION INTRAVENOUS; SUBCUTANEOUS
Status: DISCONTINUED | OUTPATIENT
Start: 2022-09-16 | End: 2022-09-19 | Stop reason: HOSPADM

## 2022-09-16 RX ORDER — DEXTROSE MONOHYDRATE 100 MG/ML
INJECTION, SOLUTION INTRAVENOUS CONTINUOUS PRN
Status: DISCONTINUED | OUTPATIENT
Start: 2022-09-16 | End: 2022-09-19 | Stop reason: HOSPADM

## 2022-09-16 RX ORDER — INSULIN LISPRO 100 [IU]/ML
0-4 INJECTION, SOLUTION INTRAVENOUS; SUBCUTANEOUS NIGHTLY
Status: DISCONTINUED | OUTPATIENT
Start: 2022-09-16 | End: 2022-09-19 | Stop reason: HOSPADM

## 2022-09-16 RX ADMIN — HYDROCORTISONE 2.5%: 25 CREAM TOPICAL at 00:20

## 2022-09-16 RX ADMIN — HYDROCORTISONE 2.5%: 25 CREAM TOPICAL at 09:59

## 2022-09-16 RX ADMIN — CHLORHEXIDINE GLUCONATE 0.12% ORAL RINSE 15 ML: 1.2 LIQUID ORAL at 00:20

## 2022-09-16 RX ADMIN — ATORVASTATIN CALCIUM 80 MG: 40 TABLET, FILM COATED ORAL at 00:20

## 2022-09-16 RX ADMIN — SODIUM CHLORIDE, PRESERVATIVE FREE 10 ML: 5 INJECTION INTRAVENOUS at 20:42

## 2022-09-16 RX ADMIN — ATORVASTATIN CALCIUM 80 MG: 40 TABLET, FILM COATED ORAL at 20:43

## 2022-09-16 RX ADMIN — SODIUM CHLORIDE, PRESERVATIVE FREE 10 ML: 5 INJECTION INTRAVENOUS at 09:58

## 2022-09-16 RX ADMIN — APIXABAN 2.5 MG: 5 TABLET, FILM COATED ORAL at 00:20

## 2022-09-16 RX ADMIN — ONDANSETRON HYDROCHLORIDE 4 MG: 2 INJECTION, SOLUTION INTRAMUSCULAR; INTRAVENOUS at 01:56

## 2022-09-16 RX ADMIN — HYDROCORTISONE 2.5%: 25 CREAM TOPICAL at 20:42

## 2022-09-16 RX ADMIN — SODIUM CHLORIDE, PRESERVATIVE FREE 10 ML: 5 INJECTION INTRAVENOUS at 00:21

## 2022-09-16 RX ADMIN — LORAZEPAM 0.5 MG: 0.5 TABLET ORAL at 20:43

## 2022-09-16 RX ADMIN — TRAZODONE HYDROCHLORIDE 50 MG: 50 TABLET ORAL at 00:20

## 2022-09-16 RX ADMIN — TRAZODONE HYDROCHLORIDE 50 MG: 50 TABLET ORAL at 20:43

## 2022-09-16 RX ADMIN — CHLORHEXIDINE GLUCONATE 0.12% ORAL RINSE 15 ML: 1.2 LIQUID ORAL at 20:42

## 2022-09-16 RX ADMIN — IPRATROPIUM BROMIDE AND ALBUTEROL SULFATE 3 ML: 2.5; .5 SOLUTION RESPIRATORY (INHALATION) at 11:51

## 2022-09-16 RX ADMIN — METOPROLOL TARTRATE 25 MG: 25 TABLET, FILM COATED ORAL at 20:43

## 2022-09-16 RX ADMIN — IPRATROPIUM BROMIDE AND ALBUTEROL SULFATE 3 ML: 2.5; .5 SOLUTION RESPIRATORY (INHALATION) at 07:15

## 2022-09-16 RX ADMIN — LORAZEPAM 0.5 MG: 0.5 TABLET ORAL at 00:20

## 2022-09-16 RX ADMIN — IPRATROPIUM BROMIDE AND ALBUTEROL SULFATE 3 ML: 2.5; .5 SOLUTION RESPIRATORY (INHALATION) at 19:46

## 2022-09-16 RX ADMIN — APIXABAN 2.5 MG: 5 TABLET, FILM COATED ORAL at 20:42

## 2022-09-16 RX ADMIN — IPRATROPIUM BROMIDE AND ALBUTEROL SULFATE 3 ML: 2.5; .5 SOLUTION RESPIRATORY (INHALATION) at 16:47

## 2022-09-16 NOTE — H&P
Hospital Medicine History & Physical      PCP: Susan Anne MD    Date of Admission: 9/15/2022    Date of Service: Pt seen/examined on 9/16/2022     Chief Complaint:    Chief Complaint   Patient presents with    Chest Pain     Pt. Arrived via EMS for chest pain 20/10 tightness. Pt. Got dialysis today removed 4L. Pt. Aggie Appleer on blow by. History Of Present Illness: The patient is a 71 y.o. female with pmhx of CKD, HLD, pulmonary hypertension, DM type 2, atrial fibrillation, CAD, chronic respiratory failure, hx of VTE, hx of CVA, chronic pain who presented to [facility] ED with complaint of right sided chest pain that started on Wednesday and has been persistent since. She does not recall any recent injury or trauma to the area. Pain does not radiate anywhere and is a heaviness sensation. She denies any shortness of breath. Chest pain was present up until yesterday afternoon and has now resolved. Pt also endorses right arm swelling, has been off and on for a while now. It is in the same arm as her fistula and she is suppose to get it evaluated. Denies any associated pain. No fever, chills, cough, nausea, vomiting, diarrhea, hematuria, dysuria, dizziness, or syncope.     Past Medical History:        Diagnosis Date    Acute and chronic respiratory failure (acute-on-chronic) (HCC)     Acute blood loss anemia 7/1/2020    Acute deep vein thrombosis (DVT) of brachial vein of left upper extremity ([address] 2/20/2019    Formatting of this note might be different from the original. Dx February 2019    Anxiety disorder     Atherosclerotic heart disease of native coronary artery without angina pectoris     Bleeding hemorrhoids 6/29/2020    Cerebellar infarct ([address] 9/29/2019    Cerebral infarction (HCC)     Chronic pain syndrome     Dependence on renal dialysis ([address]     Dependence on respirator ([address]     End stage renal disease ([address]     Gastro-esophageal reflux disease with esophagitis, without bleeding     Insomnia     Major depressive disorder, recurrent (HCC)     Morbid obesity due to excess calories (HCC)     Muscle weakness     Obstructive sleep apnea (adult) (pediatric)     Other hyperlipidemia     Other voice and resonance disorders     Personal history of COVID-19     Pulmonary hypertension ([facility]     Tracheostomy status ([facility]     Type 2 diabetes mellitus without complications ([facility]     Unspecified atrial fibrillation [facility])        Past Surgical History:        Procedure Laterality Date    COLECTOMY N/A 5/10/2022    EXPLORATORY LAPAROTOMY, LYSIS OF ADHESIONS performed by Susu Marin MD at [address] [place] [address]       Medications Prior to Admission:    Prior to Admission medications    Medication Sig Start Date End Date Taking? Authorizing Provider   apixaban (ELIQUIS) 2.5 MG TABS tablet Take 2.5 mg by mouth 2 times daily   Yes Historical Provider, MD   LORazepam (ATIVAN) 0.5 MG tablet Take 0.5 mg by mouth 3 times daily. Yes Historical Provider, MD   LORazepam (ATIVAN) 0.5 MG tablet Take 0.5 mg by mouth every 12 hours as needed for Anxiety. Yes Historical Provider, MD   loperamide (IMODIUM) 2 MG capsule Take 2 mg by mouth 4 times daily as needed for Diarrhea   Yes Historical Provider, MD   oxyCODONE 5 MG capsule Take 5 mg by mouth in the morning and 5 mg in the evening.    Yes Historical Provider, MD   promethazine (PHENERGAN) 12.5 MG tablet Take 12.5 mg by mouth every 6 hours as needed for Nausea   Yes Historical Provider, MD   traZODone (DESYREL) 50 MG tablet Take 50 mg by mouth nightly   Yes Historical Provider, MD   vitamin B-12 (CYANOCOBALAMIN) 500 MCG tablet Take 500 mcg by mouth daily   Yes Historical Provider, MD   metoprolol tartrate (LOPRESSOR) 25 MG tablet Take 1 tablet by mouth 2 times daily 5/21/22   Cory Dodson MD   insulin glargine (LANTUS) 100 UNIT/ML injection vial Inject 15 Units into the skin 2 times daily  Patient not taking: Reported on 9/15/2022 5/21/22   Cory Dodson MD   warfarin (COUMADIN) 3 MG tablet Take 1 tablet by mouth nightly  Patient not taking: Reported on 9/15/2022 4/30/22   Veronica Dowling MD   hydrocortisone (ANUSOL-HC) 2.5 % CREA rectal cream Place rectally 2 times daily 3/3/22   Cesar Price DO   aspirin 81 MG EC tablet Take 81 mg by mouth daily  Patient not taking: Reported on 9/15/2022    Historical Provider, MD   cyanocobalamin 1000 MCG tablet Take 500 mcg by mouth daily  Patient not taking: Reported on 9/15/2022    Historical Provider, MD   QUEtiapine (SEROQUEL) 25 MG tablet Take 12.5 mg by mouth nightly  Patient not taking: Reported on 9/15/2022    Historical Provider, MD   sodium polystyrene (KAYEXALATE) 15 GM/60ML suspension Take 15 g by mouth 4 times daily Sun, Mon, Wed, Fri  Patient not taking: Reported on 9/15/2022    Historical Provider, MD   calcium acetate 667 MG TABS Take 1,334 mg by mouth 3 times daily (with meals)    Historical Provider, MD   hydrocortisone 1 % cream Apply topically 2 times daily Apply topically 2 times daily.     Historical Provider, MD   acetaminophen (TYLENOL) 325 MG tablet Take 650 mg by mouth every 6 hours as needed for Pain    Historical Provider, MD   atorvastatin (LIPITOR) 80 MG tablet Take 80 mg by mouth at bedtime    Historical Provider, MD   diphenhydrAMINE (BENADRYL) 25 MG capsule Take 25 mg by mouth every 8 hours as needed     Historical Provider, MD   docusate sodium (COLACE) 100 MG capsule Take 100 mg by mouth 2 times daily  Patient not taking: Reported on 9/15/2022    Historical Provider, MD   lactulose encephalopathy 10 GM/15ML SOLN solution Take 20 g by mouth daily as needed   Patient not taking: Reported on 9/15/2022    Historical Provider, MD   polyethylene glycol (GLYCOLAX) 17 g packet Take 17 g by mouth daily as needed for Constipation    Historical Provider, MD   ipratropium-albuterol (DUONEB) 0.5-2.5 (3) MG/3ML SOLN nebulizer solution Inhale 1 vial into the lungs every 4 hours    Historical Provider, MD furosemide (LASIX) 20 MG tablet Take 20 mg by mouth three times a week MWF  Patient not taking: Reported on 9/15/2022    Historical Provider, MD   midodrine (PROAMATINE) 5 MG tablet Take 10 mg by mouth three times a week Lawrenceangel urbanoannie sat pre dialysis    Historical Provider, MD   omeprazole (PRILOSEC) 20 MG delayed release capsule Take 20 mg by mouth daily    Historical Provider, MD   chlorhexidine (PERIDEX) 0.12 % solution Take 15 mLs by mouth 2 times daily    Historical Provider, MD   sennosides-docusate sodium (SENOKOT-S) 8.6-50 MG tablet Take 2 tablets by mouth at bedtime   Patient not taking: Reported on 9/15/2022    Historical Provider, MD   sertraline (ZOLOFT) 50 MG tablet Take 50 mg by mouth daily    Historical Provider, MD   terazosin (HYTRIN) 1 MG capsule Take 1 mg by mouth nightly  Patient not taking: Reported on 9/15/2022    Historical Provider, MD   ondansetron (ZOFRAN) 4 MG tablet Take 4 mg by mouth every 8 hours as needed for Nausea or Vomiting  Patient not taking: Reported on 9/15/2022    Historical Provider, MD       Allergies:  Haloperidol lactate and Ziprasidone hcl    Social History:      TOBACCO:   reports that she has quit smoking. She has never used smokeless tobacco.  ETOH:   reports no history of alcohol use. Family History:   Positive as follows:    History reviewed. No pertinent family history.     REVIEW OF SYSTEMS:       Constitutional: Negative for fever   HENT: Negative for sore throat   Eyes: Negative for redness   Respiratory: Negative  for dyspnea, cough   Cardiovascular: + for chest pain   Gastrointestinal: Negative for vomiting, diarrhea   Genitourinary: Negative for hematuria   Musculoskeletal: Negative for arthralgias   Skin: Negative for rash   Neurological: Negative for syncope   Hematological: Negative for adenopathy   Psychiatric/Behavorial: Negative for anxiety    PHYSICAL EXAM:    BP (!) 96/55   Pulse 64   Temp 97.3 °F (36.3 °C) (Axillary)   Resp 18   Ht 5' 4\" (1.626 m)   Wt 279 lb 3.2 oz (126.6 kg)   SpO2 100%   BMI 47.92 kg/m²     Gen: No distress. Alert. Pleasant obese  [place] female   Eyes: PERRL. No sclera icterus. No conjunctival injection. ENT: No discharge. Pharynx clear. +tracheostomy   Neck: No JVD. No Carotid Bruit. Trachea midline. Resp: No accessory muscle use. No crackles. No wheezes. No rhonchi. CV: Regular rate. Regular rhythm. No murmur. No rub. No edema. Peripheral Pulses: +2 palpable, equal bilaterally   GI: Non-tender. Non-distended. No masses. No organomegaly. Normal bowel sounds. No hernia. Skin: Warm and dry. No nodule on exposed extremities. No rash on exposed extremities. ++right arm swelling extending down into her hand,+fistula, + cannot detect great pulses 2/2 to edema   M/S: No cyanosis. No joint deformity. No clubbing. Neuro: Awake. Grossly nonfocal    Psych: Oriented x 3. No anxiety or agitation.      Lab Results   Component Value Date    WBC 7.2 09/16/2022    HGB 10.3 (L) 09/16/2022    HCT 32.6 (L) 09/16/2022    MCV 93.9 09/16/2022     09/16/2022     Lab Results   Component Value Date     09/16/2022    K 5.1 09/16/2022    CL 96 (L) 09/16/2022    CO2 28 09/16/2022    BUN 55 (H) 09/16/2022    CREATININE 5.1 (HH) 09/16/2022    GLUCOSE 128 (H) 09/16/2022    CALCIUM 8.8 09/16/2022    PROT 7.0 09/16/2022    LABALBU 3.3 (L) 09/16/2022    BILITOT 0.6 09/16/2022    ALKPHOS 100 09/16/2022    AST 8 (L) 09/16/2022    ALT 8 (L) 09/16/2022    LABGLOM 8 (A) 09/16/2022    GFRAA 10 (A) 09/16/2022    AGRATIO 0.9 (L) 09/16/2022    GLOB 4.6 06/17/2021         CARDIAC ENZYMES  Recent Labs     09/15/22  1516 09/15/22  2236 09/16/22  0417   TROPONINI 0.21* 0.24* 0.23*       ABG    Lab Results   Component Value Date/Time    DHK4KJQ 27.2 05/11/2022 06:20 AM    BEART 1.6 05/11/2022 06:20 AM    A9IIZMLO 99.3 05/11/2022 06:20 AM    PHART 7.365 05/11/2022 06:20 AM    GNU5GLK 47.7 05/11/2022 06:20 AM    PO2ART 148.0 05/11/2022 06:20 AM    NRB2JBC 64.3 05/11/2022 06:20 AM       CULTURES  COVID not detected     EKG:  Sinus rhythm with 1st degree A-V blockCannot rule out Anterior infarct , age undeterminedNonspecific ST abnormalityAbnormal ECGWhen compared with ECG of 09-MAY-2022 05:33,Sinus rhythm has replaced Atrial fibrillationConfirmed by Sharifa Berrios ([id number]) on 9/15/2022 2:07:07 PM     RADIOLOGY  XR CHEST PORTABLE   Final Result   Cardiomegaly and low lung volumes. No acute cardiopulmonary process. NM Cardiac Stress Test Nuclear Imaging    (Results Pending)         Pertinent previous results reviewed   Echocardiogram from 2/17/22   Summary   Definity contrast administered. Left ventricular systolic function is mildly reduced with visually estimated   EF of 45%. Endocardium not entirely well visualized but no obvious segmental wall   motion abnormalities. Diastolic dysfunction grade and filling pressure are indeterminate. Mild concentric left ventricular hypertrophy. Unable to obtain SPAP due to lack of tricuspid regurgitation. Valves are not well visualized but there are no obvious structural   abnormalities or color flow abnormalities seen on doppler. 9- [facility].  60%    ASSESSMENT/PLAN:  #Chest pain   -trop elevated but trending down likely 2/2 to ESRD  -EKG without acute ischemic changes   -hx of CAD on ASA, statin, and BB   -NPO   -CXR neg   -chemical stress test pending    #Right arm swelling   -needs fistulogram outpatient   -doppler pending   -elevate arm     #ESRD   -on HD Tues Thurs Sat  -continue home meds   -nephrology consulted     #Chronic diastolic HF   -last echo from 2/17/22 as above   -no s/s of acute decompensation     #Chronic respiratory failure   -s/p tracheostomy more than 1 year ago   -currently on trach collar with humidified air     #Atrial fibrillation   -NSR on presentation   -rate controlled   -on eliquis [facility]     #Chronic Anemia   -Hgb stable  -likely 2/2 to ESRD  -on

## 2022-09-16 NOTE — PROGRESS NOTES
Patient admitted to room 316 from ED. Patient oriented to room, call light, bed rails, phone, lights and bathroom. Patient instructed about the schedule of the day including: vital sign frequency, lab draws, possible tests, frequency of MD and staff rounds, daily weights, I &O's and prescribed diet. Telemetry box in place, patient aware of placement and reason. Bed locked, in lowest position, side rails up 2/4, call light within reach. Recliner Assessment  Patient is not able to demonstrated the ability to move from a reclining position to an upright position within the recliner. however patient is alert, oriented and able to provide informed consent       4 Eyes Skin Assessment     The patient is being assess for   Admission    I agree that 2 RN's have performed a thorough Head to Toe Skin Assessment on the patient. ALL assessment sites listed below have been assessed. Areas assessed for pressure by both nurses:   [x]   Head, Face, and Ears   [x]   Shoulders, Back, and Chest, Abdomen  [x]   Arms, Elbows, and Hands   [x]   Coccyx, Sacrum, and Ischium  [x]   Legs, Feet, and Heels        Skin Assessed Under all Medical Devices by both nurses:  trach              Scattered scratchs/scabs             **SHARE this note so that the co-signing nurse is able to place an eSignature**    Co-signer eSignature: Electronically signed by My Oswald RN on 9/16/22 at 7:36 AM EDT    Does the Patient have Skin Breakdown related to pressure?   No            Stu Prevention initiated:  Yes   Wound Care Orders initiated:  NA      [facility] nurse consulted for Pressure Injury (Stage 3,4, Unstageable, DTI, NWPT, Complex wounds)and New or Established Ostomies:  NA      Primary Nurse eSignature: Electronically signed by Tamera David RN on 9/16/22 at 7:34 AM EDT

## 2022-09-16 NOTE — CARE COORDINATION
Case Management Assessment  Initial Evaluation      Patient Name: Bharati Goode  YOB: 1950  Diagnosis: Chest pain [R07.9]  Date / Time: 9/15/2022 11:08 AM    Admission status/Date: Observation 9/15/2022  Chart Reviewed: Yes      Patient Interviewed: Yes   Family Interviewed:  No      Hospitalization in the last 30 days:  No      Health Care Decision Maker :   Primary Decision Maker: Rivera Harrison - [phone]      Who do you trust or have selected to make healthcare decisions for you      Met with: Patient at bedside. Current PCP: Radha Ya MD     Financial  [facility]   Precert required for SNF : Y          3 night stay required - N    ADLS  Support Systems/Care Needs: Children  Transportation:  per facility    Meal Preparation: per facility    Housing  Living Arrangements: LTC at [facility]  Steps: 0  Intent for return to present living arrangements: Yes  Identified Issues: None at this time. Durable Medical Equiptment   DME Provider: per facility  Equipment: per facility  Walker___Cane___RTS___ BSC___Shower Chair___Hospital Bed___W/C____Other________  02 at ____Liter(s)---wears(frequency)_______ [facility] ___ CPAP___ BiPap___   N/A____      Home O2 Use :  Yes    If No for home O2---if presently on O2 during hospitalization:  Yes  if yes CM to follow for potential DC O2 need  Informed of need for care provider to bring portable home O2 tank on day of discharge for nursing to connect prior to leaving:   Not Indicated  Verbalized agreement/Understanding:   Not Indicated    Community Service Affiliation  Dialysis:    Agency:[facility]  Location: [place]  Dialysis Schedule: T-TH-SAT 8:30 am  Phone: 21 [phone]  Fax: Other Community Services: n/a     DISCHARGE PLAN: Explained Case Management role/services. CM reviewed chart and met with patient at bedside to discuss discharge needs and plan.     Patient is LTC at [facility]). Plans return at discharge. Spoke to Kristina at [facility] who confirms patient is LTC. No barriers to return and no precert needed.  Will need -Covid for return; states if returns today will not need new test.    Marlin Wright RN

## 2022-09-16 NOTE — PROGRESS NOTES
Can we please change her celexa to 20 mg daily on the med list.    Spoke to pt daughter Caseykeven Cervantes, updated on pt status at this time.

## 2022-09-16 NOTE — PROGRESS NOTES
RT Inhaler-Nebulizer Bronchodilator Protocol Note    There is a bronchodilator order in the chart from a provider indicating to follow the RT Bronchodilator Protocol and there is an Initiate RT Inhaler-Nebulizer Bronchodilator Protocol order as well (see protocol at bottom of note). CXR Findings:  XR CHEST PORTABLE    Result Date: 9/15/2022  Cardiomegaly and low lung volumes. No acute cardiopulmonary process. The findings from the last RT Protocol Assessment were as follows:   History Pulmonary Disease: Chronic pulmonary disease  Respiratory Pattern: Regular pattern and RR 12-20 bpm  Breath Sounds: Slightly diminished and/or crackles  Cough: Strong, spontaneous, non-productive  Indication for Bronchodilator Therapy: Decreased or absent breath sounds  Bronchodilator Assessment Score: 4    Aerosolized bronchodilator medication orders have been revised according to the RT Inhaler-Nebulizer Bronchodilator Protocol below. Respiratory Therapist to perform RT Therapy Protocol Assessment initially then follow the protocol. Repeat RT Therapy Protocol Assessment PRN for score 0-3 or on second treatment, BID, and PRN for scores above 3. No Indications - adjust the frequency to every 6 hours PRN wheezing or bronchospasm, if no treatments needed after 48 hours then discontinue using Per Protocol order mode. If indication present, adjust the RT bronchodilator orders based on the Bronchodilator Assessment Score as indicated below. Use Inhaler orders unless patient has one or more of the following: on home nebulizer, not able to hold breath for 10 seconds, is not alert and oriented, cannot activate and use MDI correctly, or respiratory rate 25 breaths per minute or more, then use the equivalent nebulizer order(s) with same Frequency and PRN reasons based on the score. If a patient is on this medication at home then do not decrease Frequency below that used at home.     0-3 - enter or revise RT bronchodilator order(s) to equivalent RT Bronchodilator order with Frequency of every 4 hours PRN for wheezing or increased work of breathing using Per Protocol order mode. 4-6 - enter or revise RT Bronchodilator order(s) to two equivalent RT bronchodilator orders with one order with BID Frequency and one order with Frequency of every 4 hours PRN wheezing or increased work of breathing using Per Protocol order mode. 7-10 - enter or revise RT Bronchodilator order(s) to two equivalent RT bronchodilator orders with one order with TID Frequency and one order with Frequency of every 4 hours PRN wheezing or increased work of breathing using Per Protocol order mode. 11-13 - enter or revise RT Bronchodilator order(s) to one equivalent RT bronchodilator order with QID Frequency and an Albuterol order with Frequency of every 4 hours PRN wheezing or increased work of breathing using Per Protocol order mode. Greater than 13 - enter or revise RT Bronchodilator order(s) to one equivalent RT bronchodilator order with every 4 hours Frequency and an Albuterol order with Frequency of every 2 hours PRN wheezing or increased work of breathing using Per Protocol order mode.      Electronically signed by Girish Steiner RCP on 9/15/2022 at 11:39 PM

## 2022-09-16 NOTE — CONSULTS
[facility] Consult Note           Reason for Consult:  End stage kidney disease  Requesting Physician:  Dr. Jeannie Jarvis    Chief Complaint:  Chest pain  History Obtained From:  patient, electronic medical record    History of Present Ilness:    71year old female with ESKD on HD Tues-Thurs-Sat admitted with chest pain. We have been asked to assist in further dialysis care. Presented with right sided chest pain, no shortness of breath. Last had HD on 9/15 with 4 liters removed, post-weight of 122.4 kg.  +weak, intake reduced.     Past Medical History:        Diagnosis Date    Acute and chronic respiratory failure (acute-on-chronic) (Former[facility])     Acute blood loss anemia 7/1/2020    Acute deep vein thrombosis (DVT) of brachial vein of left upper extremity ([address] 2/20/2019    Formatting of this note might be different from the original. Dx February 2019    Anxiety disorder     Atherosclerotic heart disease of native coronary artery without angina pectoris     Bleeding hemorrhoids 6/29/2020    Cerebellar infarct ([address] 9/29/2019    Cerebral infarction ([facility])     Chronic pain syndrome     Dependence on renal dialysis ([address]     Dependence on respirator ([facility])     End stage renal disease ([address]     Gastro-esophageal reflux disease with esophagitis, without bleeding     Insomnia     Major depressive disorder, recurrent ([address]     Morbid obesity due to excess calories ([facility])     Muscle weakness     Obstructive sleep apnea (adult) (pediatric)     Other hyperlipidemia     Other voice and resonance disorders     Personal history of COVID-19     Pulmonary hypertension ([address]     Tracheostomy status ([address]     Type 2 diabetes mellitus without complications ([address]     Unspecified atrial fibrillation ([address]        Past Surgical History:        Procedure Laterality Date    COLECTOMY N/A 5/10/2022    EXPLORATORY LAPAROTOMY, LYSIS OF ADHESIONS performed by Felicity Perry MD at [facility] Medications:    No current facility-administered medications on file prior to encounter. Current Outpatient Medications on File Prior to Encounter   Medication Sig Dispense Refill    apixaban (ELIQUIS) 2.5 MG TABS tablet Take 2.5 mg by mouth 2 times daily      LORazepam (ATIVAN) 0.5 MG tablet Take 0.5 mg by mouth 3 times daily. LORazepam (ATIVAN) 0.5 MG tablet Take 0.5 mg by mouth every 12 hours as needed for Anxiety. loperamide (IMODIUM) 2 MG capsule Take 2 mg by mouth 4 times daily as needed for Diarrhea      oxyCODONE 5 MG capsule Take 5 mg by mouth in the morning and 5 mg in the evening.       promethazine (PHENERGAN) 12.5 MG tablet Take 12.5 mg by mouth every 6 hours as needed for Nausea      traZODone (DESYREL) 50 MG tablet Take 50 mg by mouth nightly      vitamin B-12 (CYANOCOBALAMIN) 500 MCG tablet Take 500 mcg by mouth daily      metoprolol tartrate (LOPRESSOR) 25 MG tablet Take 1 tablet by mouth 2 times daily 60 tablet 3    insulin glargine (LANTUS) 100 UNIT/ML injection vial Inject 15 Units into the skin 2 times daily (Patient not taking: Reported on 9/15/2022) 10 mL 3    warfarin (COUMADIN) 3 MG tablet Take 1 tablet by mouth nightly (Patient not taking: Reported on 9/15/2022) 30 tablet 2    hydrocortisone (ANUSOL-HC) 2.5 % CREA rectal cream Place rectally 2 times daily 1 each 0    aspirin 81 MG EC tablet Take 81 mg by mouth daily (Patient not taking: Reported on 9/15/2022)      cyanocobalamin 1000 MCG tablet Take 500 mcg by mouth daily (Patient not taking: Reported on 9/15/2022)      QUEtiapine (SEROQUEL) 25 MG tablet Take 12.5 mg by mouth nightly (Patient not taking: Reported on 9/15/2022)      sodium polystyrene (KAYEXALATE) 15 GM/60ML suspension Take 15 g by mouth 4 times daily Sun, Mon, Wed, Fri (Patient not taking: Reported on 9/15/2022)      calcium acetate 667 MG TABS Take 1,334 mg by mouth 3 times daily (with meals)      hydrocortisone 1 % cream Apply topically 2 times daily Apply topically 2 times daily.       acetaminophen (TYLENOL) 325 MG tablet Take 650 mg by mouth every 6 hours as needed for Pain      atorvastatin (LIPITOR) 80 MG tablet Take 80 mg by mouth at bedtime      diphenhydrAMINE (BENADRYL) 25 MG capsule Take 25 mg by mouth every 8 hours as needed       docusate sodium (COLACE) 100 MG capsule Take 100 mg by mouth 2 times daily (Patient not taking: Reported on 9/15/2022)      lactulose encephalopathy 10 GM/15ML SOLN solution Take 20 g by mouth daily as needed  (Patient not taking: Reported on 9/15/2022)      polyethylene glycol (GLYCOLAX) 17 g packet Take 17 g by mouth daily as needed for Constipation      ipratropium-albuterol (DUONEB) 0.5-2.5 (3) MG/3ML SOLN nebulizer solution Inhale 1 vial into the lungs every 4 hours      furosemide (LASIX) 20 MG tablet Take 20 mg by mouth three times a week MWF (Patient not taking: Reported on 9/15/2022)      midodrine (PROAMATINE) 5 MG tablet Take 10 mg by mouth three times a week Tues thurs sat pre dialysis      omeprazole (PRILOSEC) 20 MG delayed release capsule Take 20 mg by mouth daily      chlorhexidine (PERIDEX) 0.12 % solution Take 15 mLs by mouth 2 times daily      sennosides-docusate sodium (SENOKOT-S) 8.6-50 MG tablet Take 2 tablets by mouth at bedtime  (Patient not taking: Reported on 9/15/2022)      sertraline (ZOLOFT) 50 MG tablet Take 50 mg by mouth daily      terazosin (HYTRIN) 1 MG capsule Take 1 mg by mouth nightly (Patient not taking: Reported on 9/15/2022)      ondansetron (ZOFRAN) 4 MG tablet Take 4 mg by mouth every 8 hours as needed for Nausea or Vomiting (Patient not taking: Reported on 9/15/2022)         Allergies:  Haloperidol lactate and Ziprasidone hcl    Social History:    Social History     Socioeconomic History    Marital status:      Spouse name: Not on file    Number of children: Not on file    Years of education: Not on file    Highest education level: Not on file   Occupational History Not on file   Tobacco Use    Smoking status: Former    Smokeless tobacco: Never   Substance and Sexual Activity    Alcohol use: Never    Drug use: Never    Sexual activity: Not Currently   Other Topics Concern    Not on file   Social History Narrative    Not on file     Social Determinants of Health     Financial Resource Strain: Not on file   Food Insecurity: Not on file   Transportation Needs: Not on file   Physical Activity: Not on file   Stress: Not on file   Social Connections: Not on file   Intimate Partner Violence: Not on file   Housing Stability: Not on file       Family History:   History reviewed. No pertinent family history. Review of Systems:   Pertinent positives stated above in HPI. Remainder of 10 point review of systems were reviewed and were negative.     Physical exam:   Constitutional:  VITALS:  /62   Pulse 64   Temp 98 °F (36.7 °C) (Oral)   Resp 20   Ht 5' 4\" (1.626 m)   Wt 279 lb 3.2 oz (126.6 kg)   SpO2 96%   BMI 47.92 kg/m²   Gen: alert, awake, nad  Skin: no rash, turgor wnl  Heent:  eomi, mmm  Neck: no bruits or jvd noted, thyroid normal  Cardiovascular:  S1, S2 without m/r/g  Respiratory: CTA B without w/r/r; respiratory effort normal  Abdomen:  +bs, soft, nt, nd, no hepatosplenomegaly  Ext: no lower extremity edema  Access: RUE AVF  Psychiatric: mood and affect appropriate; judgement and insight intact  Musculoskeletal:  Rom, muscular strength limited; digits, nails normal    Data/  CBC:   Lab Results   Component Value Date/Time    WBC 7.2 09/16/2022 04:17 AM    RBC 3.47 09/16/2022 04:17 AM    HGB 10.3 09/16/2022 04:17 AM    HCT 32.6 09/16/2022 04:17 AM    MCV 93.9 09/16/2022 04:17 AM    MCH 29.6 09/16/2022 04:17 AM    MCHC 31.5 09/16/2022 04:17 AM    RDW 17.8 09/16/2022 04:17 AM     09/16/2022 04:17 AM    MPV 8.1 09/16/2022 04:17 AM     BMP:    Lab Results   Component Value Date/Time     09/16/2022 04:17 AM    K 5.1 09/16/2022 04:17 AM    CL 96 09/16/2022 04:17 AM    CO2 28 09/16/2022 04:17 AM    BUN 55 09/16/2022 04:17 AM    LABALBU 3.3 09/16/2022 04:17 AM    CREATININE 5.1 09/16/2022 04:17 AM    CALCIUM 8.8 09/16/2022 04:17 AM    GFRAA 10 09/16/2022 04:17 AM    LABGLOM 8 09/16/2022 04:17 AM    GLUCOSE 128 09/16/2022 04:17 AM         Assessment/    - End stage kidney disease - on HD Tues-Thurs-Sat    - NSTEMI - plans with stress testing    - Anemia of chronic disease - KOKO with HD    - Diastolic CHF    - Hypotension - midodrine with HD      Plan/    - HD tomorrow per schedule - UF as tolerated with crit line monitoring,  kg  - KOKO with HD per OP orders  - Trend labs, bp's      Thank you for the consultation. Please do not hesitate to call with questions. ____________________________________  Yue Singh MD  [facility]  [email or web address]  Office: [phone]

## 2022-09-16 NOTE — FLOWSHEET NOTE
09/16/22 0924   Vital Signs   Temp 98 °F (36.7 °C)   Temp Source Oral   Heart Rate Source Monitor   Resp 18   /62   BP Location Left lower arm   BP Method Automatic   MAP (Calculated) 87.67   Patient Position Semi fowlers   Level of Consciousness 0   Oxygen Therapy   SpO2 100 %   O2 Device Trach mask   O2 Flow Rate (L/min) 10 L/min       Shift assessment complete, see flowsheet. Pt A&O x4. Denies feelings of SOB or CP. Decreased oxygen to 8L at this time. Pt remains NPO for planned stress test today. Cardiology RN called to make staff aware that pt will require a two day stress test. MD notified. Pt expresses concerns with pain in rectum area. Has hx of hemorrhoids. Pt has had multiple incontinent BM. Hydrocortisone cream applied to area. No further needs noted at this time. Call light left within reach.

## 2022-09-16 NOTE — PROGRESS NOTES
Pt came down and IV was infiltrated. Our radiology nurses are attempting to start IV via ultrasound. Patient says she doesn't want any part of her stress test done today. She said we can see how she is feeling tomorrow.     BEAN Ferguson

## 2022-09-16 NOTE — ACP (ADVANCE CARE PLANNING)
Advance Care Planning     General Advance Care Planning (ACP) Conversation    Date of Conversation: 9/15/2022  Conducted with: Patient with Decision Making Capacity    Healthcare Decision Maker:    Primary Decision Maker: Agustina Rivera - Christy - [phone]  Click here to complete [address] including selection of the Healthcare Decision Maker Relationship (ie \"Primary\"). Today we documented Decision Maker(s) consistent with Legal Next of Kin hierarchy.     Content/Action Overview:  Has NO ACP documents/care preferences - information provided, considering goals and options  Reviewed DNR/DNI and patient elects Full Code (Attempt Resuscitation)      Length of Voluntary ACP Conversation in minutes:  <16 minutes (Non-Billable)    Rosa Isela Gonzalez RN

## 2022-09-17 ENCOUNTER — APPOINTMENT (OUTPATIENT)
Dept: NUCLEAR MEDICINE | Age: 72
End: 2022-09-17
Payer: COMMERCIAL

## 2022-09-17 LAB
ALBUMIN SERPL-MCNC: 3.1 G/DL (ref 3.4–5)
ANION GAP SERPL CALCULATED.3IONS-SCNC: 13 MMOL/L (ref 3–16)
BASOPHILS ABSOLUTE: 0 K/UL (ref 0–0.2)
BASOPHILS RELATIVE PERCENT: 0.5 %
BUN BLDV-MCNC: 64 MG/DL (ref 7–20)
CALCIUM SERPL-MCNC: 8.3 MG/DL (ref 8.3–10.6)
CHLORIDE BLD-SCNC: 95 MMOL/L (ref 99–110)
CO2: 27 MMOL/L (ref 21–32)
CREAT SERPL-MCNC: 6.1 MG/DL (ref 0.6–1.2)
EOSINOPHILS ABSOLUTE: 0.2 K/UL (ref 0–0.6)
EOSINOPHILS RELATIVE PERCENT: 3.9 %
GFR AFRICAN AMERICAN: 8
GFR NON-AFRICAN AMERICAN: 7
GLUCOSE BLD-MCNC: 105 MG/DL (ref 70–99)
GLUCOSE BLD-MCNC: 112 MG/DL (ref 70–99)
GLUCOSE BLD-MCNC: 123 MG/DL (ref 70–99)
GLUCOSE BLD-MCNC: 90 MG/DL (ref 70–99)
GLUCOSE BLD-MCNC: 91 MG/DL (ref 70–99)
HCT VFR BLD CALC: 31.2 % (ref 36–48)
HEMOGLOBIN: 10 G/DL (ref 12–16)
LV EF: 72 %
LVEF MODALITY: NORMAL
LYMPHOCYTES ABSOLUTE: 1.2 K/UL (ref 1–5.1)
LYMPHOCYTES RELATIVE PERCENT: 20 %
MCH RBC QN AUTO: 30.2 PG (ref 26–34)
MCHC RBC AUTO-ENTMCNC: 32 G/DL (ref 31–36)
MCV RBC AUTO: 94.3 FL (ref 80–100)
MONOCYTES ABSOLUTE: 0.6 K/UL (ref 0–1.3)
MONOCYTES RELATIVE PERCENT: 9.7 %
NEUTROPHILS ABSOLUTE: 4 K/UL (ref 1.7–7.7)
NEUTROPHILS RELATIVE PERCENT: 65.9 %
PDW BLD-RTO: 18.1 % (ref 12.4–15.4)
PERFORMED ON: ABNORMAL
PERFORMED ON: NORMAL
PHOSPHORUS: 5.7 MG/DL (ref 2.5–4.9)
PLATELET # BLD: 192 K/UL (ref 135–450)
PMV BLD AUTO: 7.6 FL (ref 5–10.5)
POTASSIUM REFLEX MAGNESIUM: 5.3 MMOL/L (ref 3.5–5.1)
POTASSIUM SERPL-SCNC: 5.3 MMOL/L (ref 3.5–5.1)
RBC # BLD: 3.31 M/UL (ref 4–5.2)
SODIUM BLD-SCNC: 135 MMOL/L (ref 136–145)
WBC # BLD: 6.1 K/UL (ref 4–11)

## 2022-09-17 PROCEDURE — 94761 N-INVAS EAR/PLS OXIMETRY MLT: CPT

## 2022-09-17 PROCEDURE — 85025 COMPLETE CBC W/AUTO DIFF WBC: CPT

## 2022-09-17 PROCEDURE — A9502 TC99M TETROFOSMIN: HCPCS | Performed by: NURSE PRACTITIONER

## 2022-09-17 PROCEDURE — 96366 THER/PROPH/DIAG IV INF ADDON: CPT

## 2022-09-17 PROCEDURE — 78452 HT MUSCLE IMAGE SPECT MULT: CPT

## 2022-09-17 PROCEDURE — 80069 RENAL FUNCTION PANEL: CPT

## 2022-09-17 PROCEDURE — 3430000000 HC RX DIAGNOSTIC RADIOPHARMACEUTICAL: Performed by: NURSE PRACTITIONER

## 2022-09-17 PROCEDURE — G0378 HOSPITAL OBSERVATION PER HR: HCPCS

## 2022-09-17 PROCEDURE — 6360000002 HC RX W HCPCS: Performed by: NURSE PRACTITIONER

## 2022-09-17 PROCEDURE — 2700000000 HC OXYGEN THERAPY PER DAY

## 2022-09-17 PROCEDURE — 36415 COLL VENOUS BLD VENIPUNCTURE: CPT

## 2022-09-17 PROCEDURE — 2580000003 HC RX 258: Performed by: NURSE PRACTITIONER

## 2022-09-17 PROCEDURE — 2500000003 HC RX 250 WO HCPCS: Performed by: NURSE PRACTITIONER

## 2022-09-17 PROCEDURE — 6370000000 HC RX 637 (ALT 250 FOR IP): Performed by: NURSE PRACTITIONER

## 2022-09-17 PROCEDURE — 6360000002 HC RX W HCPCS: Performed by: INTERNAL MEDICINE

## 2022-09-17 PROCEDURE — P9047 ALBUMIN (HUMAN), 25%, 50ML: HCPCS

## 2022-09-17 PROCEDURE — 6370000000 HC RX 637 (ALT 250 FOR IP): Performed by: INTERNAL MEDICINE

## 2022-09-17 PROCEDURE — 96375 TX/PRO/DX INJ NEW DRUG ADDON: CPT

## 2022-09-17 PROCEDURE — 96365 THER/PROPH/DIAG IV INF INIT: CPT

## 2022-09-17 PROCEDURE — 6360000002 HC RX W HCPCS

## 2022-09-17 PROCEDURE — 94640 AIRWAY INHALATION TREATMENT: CPT

## 2022-09-17 RX ORDER — ALBUMIN (HUMAN) 12.5 G/50ML
SOLUTION INTRAVENOUS
Status: COMPLETED
Start: 2022-09-17 | End: 2022-09-17

## 2022-09-17 RX ORDER — AMINOPHYLLINE DIHYDRATE 25 MG/ML
75 INJECTION, SOLUTION INTRAVENOUS ONCE
Status: COMPLETED | OUTPATIENT
Start: 2022-09-17 | End: 2022-09-17

## 2022-09-17 RX ORDER — ALBUMIN (HUMAN) 12.5 G/50ML
25 SOLUTION INTRAVENOUS ONCE
Status: COMPLETED | OUTPATIENT
Start: 2022-09-17 | End: 2022-09-17

## 2022-09-17 RX ADMIN — IPRATROPIUM BROMIDE AND ALBUTEROL SULFATE 3 ML: 2.5; .5 SOLUTION RESPIRATORY (INHALATION) at 20:12

## 2022-09-17 RX ADMIN — CALCIUM ACETATE 1334 MG: 667 CAPSULE ORAL at 17:23

## 2022-09-17 RX ADMIN — IPRATROPIUM BROMIDE AND ALBUTEROL SULFATE 3 ML: 2.5; .5 SOLUTION RESPIRATORY (INHALATION) at 11:57

## 2022-09-17 RX ADMIN — APIXABAN 2.5 MG: 5 TABLET, FILM COATED ORAL at 21:47

## 2022-09-17 RX ADMIN — PANTOPRAZOLE SODIUM 40 MG: 40 TABLET, DELAYED RELEASE ORAL at 05:38

## 2022-09-17 RX ADMIN — ALBUMIN (HUMAN) 25 G: 12.5 SOLUTION INTRAVENOUS at 10:49

## 2022-09-17 RX ADMIN — CHLORHEXIDINE GLUCONATE 0.12% ORAL RINSE 15 ML: 1.2 LIQUID ORAL at 21:44

## 2022-09-17 RX ADMIN — AMINOPHYLLINE 75 MG: 25 INJECTION, SOLUTION INTRAVENOUS at 07:51

## 2022-09-17 RX ADMIN — ATORVASTATIN CALCIUM 80 MG: 40 TABLET, FILM COATED ORAL at 21:47

## 2022-09-17 RX ADMIN — EPOETIN ALFA-EPBX 8000 UNITS: 10000 INJECTION, SOLUTION INTRAVENOUS; SUBCUTANEOUS at 09:27

## 2022-09-17 RX ADMIN — ALBUMIN (HUMAN) 25 G: 0.25 INJECTION, SOLUTION INTRAVENOUS at 10:49

## 2022-09-17 RX ADMIN — SERTRALINE HYDROCHLORIDE 50 MG: 50 TABLET ORAL at 12:46

## 2022-09-17 RX ADMIN — TETROFOSMIN 34 MILLICURIE: 1.38 INJECTION, POWDER, LYOPHILIZED, FOR SOLUTION INTRAVENOUS at 07:49

## 2022-09-17 RX ADMIN — HYDROCORTISONE 2.5%: 25 CREAM TOPICAL at 21:44

## 2022-09-17 RX ADMIN — OXYCODONE 5 MG: 5 TABLET ORAL at 17:22

## 2022-09-17 RX ADMIN — METOPROLOL TARTRATE 25 MG: 25 TABLET, FILM COATED ORAL at 21:47

## 2022-09-17 RX ADMIN — CALCIUM ACETATE 1334 MG: 667 CAPSULE ORAL at 12:46

## 2022-09-17 RX ADMIN — IPRATROPIUM BROMIDE AND ALBUTEROL SULFATE 3 ML: 2.5; .5 SOLUTION RESPIRATORY (INHALATION) at 15:41

## 2022-09-17 RX ADMIN — REGADENOSON 0.4 MG: 0.08 INJECTION, SOLUTION INTRAVENOUS at 07:50

## 2022-09-17 RX ADMIN — TRAZODONE HYDROCHLORIDE 50 MG: 50 TABLET ORAL at 21:47

## 2022-09-17 RX ADMIN — SODIUM CHLORIDE, PRESERVATIVE FREE 10 ML: 5 INJECTION INTRAVENOUS at 21:50

## 2022-09-17 ASSESSMENT — PAIN SCALES - GENERAL: PAINLEVEL_OUTOF10: 4

## 2022-09-17 NOTE — PROGRESS NOTES
A dc  stress test was completed on this patient as ordered. The patient tolerated the procedure well. Awaiting stress imaging at this time.

## 2022-09-17 NOTE — PLAN OF CARE
Problem: Discharge Planning  Goal: Discharge to home or other facility with appropriate resources  9/16/2022 2019 by Billy Avaols RN  Outcome: Progressing  9/16/2022 1957 by Jose Matta RN  Outcome: Progressing     Problem: Pain  Goal: Verbalizes/displays adequate comfort level or baseline comfort level  9/16/2022 2019 by Billy Avalos RN  Outcome: Progressing  9/16/2022 1957 by Jose Matta RN  Outcome: Progressing     Problem: Safety - Adult  Goal: Free from fall injury  9/16/2022 2019 by Billy Avalos RN  Outcome: Progressing  Flowsheets (Taken 9/16/2022 1959 by Jose Matta RN)  Free From Fall Injury: Instruct family/caregiver on patient safety  9/16/2022 1957 by Jose Matta RN  Outcome: Progressing     Problem: ABCDS Injury Assessment  Goal: Absence of physical injury  9/16/2022 2019 by Billy Avalos RN  Outcome: Progressing  Flowsheets (Taken 9/16/2022 1959 by Jose Matta RN)  Absence of Physical Injury: Implement safety measures based on patient assessment  9/16/2022 1957 by Jose Matta RN  Outcome: Progressing     Problem: Skin/Tissue Integrity  Goal: Absence of new skin breakdown  Description: 1. Monitor for areas of redness and/or skin breakdown  2. Assess vascular access sites hourly  3. Every 4-6 hours minimum:  Change oxygen saturation probe site  4. Every 4-6 hours:  If on nasal continuous positive airway pressure, respiratory therapy assess nares and determine need for appliance change or resting period.   9/16/2022 2019 by Billy Avalos RN  Outcome: Progressing  9/16/2022 1957 by Jose Matta RN  Outcome: Progressing

## 2022-09-17 NOTE — PROGRESS NOTES
[facility] Progress Note           Subjective/   71y.o. year old female who we are seeing in consultation for ESKD on HD. HPI:  Seen on dialysis. Orders confirmed. Pre-weight at HD today 127 kg. Hypotensive with HD, receiving prn albumin. Objective/   GEN:  Chronically ill, /62   Pulse 64   Temp 98 °F (36.7 °C) (Oral)   Resp 18   Ht 5' 4\" (1.626 m)   Wt 288 lb (130.6 kg)   SpO2 100%   BMI 49.44 kg/m²   HEENT: non-icteric, no JVD  CV: S1, S2 without m/r/g; + LE edema  RESP: CTA B without w/r/r; breathing wnl  ABD: +bs, soft, nt, no hsm  SKIN: warm, no rashes  ACCESS: RUE AVF    Data/  Recent Labs     09/15/22  1137 09/16/22  0417 09/17/22  0451   WBC 9.0 7.2 6.1   HGB 12.3 10.3* 10.0*   HCT 39.1 32.6* 31.2*   MCV 94.7 93.9 94.3    196 192     Recent Labs     09/15/22  1137 09/16/22  0417 09/17/22  0451    136 135*   K 4.9 5.1 5.3*  5.3*   CL 94* 96* 95*   CO2 29 28 27   GLUCOSE 144* 128* 91   PHOS  --   --  5.7*   BUN 41* 55* 64*   CREATININE 4.2* 5.1* 6.1*   LABGLOM 10* 8* 7*   GFRAA 13* 10* 8*       Assessment/     - End stage kidney disease - on HD Tues-Thurs-Sat     - NSTEMI - plans for stress testing     - Anemia of chronic disease - KOKO with HD     - Diastolic CHF     - Hypotension - midodrine with HD       Plan/     - HD today per schedule - UF as tolerated with crit line monitoring, prn albumin,  kg  - KOKO with HD per OP orders  - Trend labs, bp's    ____________________________________  Michelle Padilla MD  [facility]  [email or web address]  Office: [phone]

## 2022-09-17 NOTE — FLOWSHEET NOTE
09/16/22 1942   Vital Signs   Temp 98.7 °F (37.1 °C)   Temp Source Oral   Heart Rate 82   Heart Rate Source Monitor   Resp 18   /63   BP Location Left lower arm   BP Method Automatic   MAP (Calculated) 88   Patient Position Sitting   Level of Consciousness 0   MEWS Score 1   Pain Assessment   Pain Assessment None - Denies Pain   Oxygen Therapy   SpO2 100 %   O2 Device Trach mask   FiO2  35 %   O2 Flow Rate (L/min) 10 L/min   Shift assessment completed. Patient in bed awake. A/Ox4. VSS. Patient appears to be anxious. Respiratory at bedside to suction trachea. Patient denies any pain. Will reassess patient when administering PM meds. Call light and bedside table within reach.

## 2022-09-17 NOTE — PROGRESS NOTES
Patient returned from Dialysis at 1230. Patient vitals stable on 10L blow by. Patient requests, more snacks, a bath, and fresh linens. PCA aware of requests. Attempt will be made when additional assistance is available. Call light within reach.

## 2022-09-17 NOTE — PROGRESS NOTES
Treatment time: 3.0 hours  Net UF: 4500 ml     Pre weight: 127.0 kg  Post weight:122.5 kg  EDW: 120.0 kg    Access used: LUAF    Access function: well with  ml/min     Medications or blood products given: Retacrit 8,000 units     Regular outpatient schedule: TTS     Summary of response to treatment: Patient tolerated treatment well and without any complications. Patient remained stable throughout entire treatment.        09/17/22 0905 09/17/22 1225   Treatment   Time On 0905  --    Time Off  --  1215   Treatment Goal 4500 5000   Vital Signs   /63 (!) 104/54   Temp 98 °F (36.7 °C) 98 °F (36.7 °C)   Heart Rate 68 75   Resp 18 18   Weight 279 lb 15.8 oz (127 kg) 270 lb 1 oz (122.5 kg)   Dry Weight 264 lb 8.8 oz (120 kg)  --    Dialysis Bath   K+ (Potassium) 2  --    Ca+ (Calcium) 2.5  --    Na+ (Sodium) 138  --    HCO3 (Bicarb) 35  --

## 2022-09-17 NOTE — PROGRESS NOTES
Hospitalist Progress Note      PCP: Desi Lam MD    Date of Admission: 9/15/2022    Subjective: getting dialysis today, awaiting second part of stress test    Medications:  Reviewed    Infusion Medications    dextrose      sodium chloride       Scheduled Medications    insulin lispro  0-4 Units SubCUTAneous TID WC    insulin lispro  0-4 Units SubCUTAneous Nightly    epoetin claire-epbx  8,000 Units IntraVENous Once per day on Tue Thu Sat    apixaban  2.5 mg Oral BID    atorvastatin  80 mg Oral Nightly    calcium acetate  1,334 mg Oral TID WC    aspirin  81 mg Oral Daily    chlorhexidine  15 mL Mouth/Throat BID    hydrocortisone   Rectal BID    metoprolol tartrate  25 mg Oral BID    midodrine  10 mg Oral Once per day on Mon Wed Fri    pantoprazole  40 mg Oral QAM AC    oxyCODONE  5 mg Oral BID    sertraline  50 mg Oral Daily    traZODone  50 mg Oral Nightly    vitamin B-12  500 mcg Oral Daily    sodium chloride flush  5-40 mL IntraVENous 2 times per day    ipratropium-albuterol  1 vial Inhalation 4x daily     PRN Meds: glucose, dextrose bolus **OR** dextrose bolus, glucagon (rDNA), dextrose, LORazepam, sodium chloride flush, sodium chloride, ondansetron **OR** ondansetron, acetaminophen **OR** acetaminophen, polyethylene glycol    No intake or output data in the 24 hours ending 09/17/22 1704    Physical Exam Performed:    /62   Pulse 81   Temp 98.2 °F (36.8 °C) (Oral)   Resp 18   Ht 5' 4\" (1.626 m)   Wt 270 lb 1 oz (122.5 kg)   SpO2 97%   BMI 46.36 kg/m²     Gen: No distress. Alert. Pleasant obese  [place] female   Eyes: PERRL. No sclera icterus. No conjunctival injection. ENT: No discharge. Pharynx clear. +tracheostomy   Neck: No JVD. No Carotid Bruit. Trachea midline. Resp: No accessory muscle use. No crackles. No wheezes. No rhonchi. CV: Regular rate. Regular rhythm. No murmur. No rub. No edema. Peripheral Pulses: +2 palpable, equal bilaterally   GI: Non-tender. changes   -hx of CAD on ASA, statin, and BB   -NPO   -CXR neg   -chemical stress test first part done and awaiting second part     #Right arm swelling   -needs fistulogram outpatient   -doppler pending   -elevate arm      #ESRD   -on HD Tues Thurs Sat  -continue home meds   -nephrology consulted      #Chronic diastolic HF   -last echo from 2/17/22 as above   -no s/s of acute decompensation      #Chronic respiratory failure   -s/p tracheostomy more than 1 year ago   -currently on trach collar with humidified air      #Atrial fibrillation   -NSR on presentation   -rate controlled   -on eliquis for [facility]      #Chronic Anemia   -Hgb stable  -likely 2/2 to ESRD  -on B12 as well      #Hx of VTE   -on eliquis   -RUE doppler pending      #DM type 2   -BG actually on lower side   -hypoglycemic protocol with SSI      #HLD   -on statin     #Hypotension   -on midodrine      #Pulmonary HTN      #Chronic pain   -on oxycodone      #Depression  #Anxiety   -ativan prn    -on zoloft     #GERD   -on PPI      #B12 deficiency   -continue vitamin B12      #Hx of MRSA  -contact precautions        DVT Prophylaxis: eliquis   Diet: ADULT DIET; Regular; 4 carb choices (60 gm/meal); Low Potassium (Less than 3000 mg/day); Low Phosphorus (Less than 1000 mg);  No Caffeine  Code Status: Full Code  PT/OT Eval Status: ordered    Dispo - cont care, getting dialysis          Agusto Marc MD

## 2022-09-17 NOTE — PROGRESS NOTES
Spoke with Martha Alva RN in Stress Lab. Patient being transported to Dialysis. Patient to have part 2 of stress test in AM. Patient does not need to be NPO for testing.

## 2022-09-17 NOTE — ED PROVIDER NOTES
[facility] PCU TELEMETRY      CHIEF COMPLAINT  Chest Pain (Pt. Arrived via EMS for chest pain 20/10 tightness. Pt. Got dialysis today removed 4L. Pt. Elvia Hicks on blow by. )       HISTORY OF PRESENT ILLNESS  Sarah Parnell is a 71 y.o. female with a past medical history of tracheostomy, end-stage renal disease on hemodialysis, stroke, VTE on Eliquis, atrial fibrillation, and diabetes who presents to the ED complaining of chest pain. The patient states that she was at dialysis. During treatment she felt short of breath and had what she describes as a 20 out of 10 chest tightness on the left. It did not radiate. She cannot tell if it was exertional, but she denies that it is pleuritic. As she states that she did not feel well, dialysis center sent her here for further evaluation. She denies chest pain on arrival here she states the pain is resolved. She has no known history of CAD she states. She again denies any shortness of breath or chest pain on arrival here, she is compliant with her Eliquis. She denies recent cough or fever. Her primary site of pain is her rectum she states this has been an ongoing issue she was diagnosed with hemorrhoids and she states that treatment is not helping. She denies abdominal pain today. No other complaints, modifying factors or associated symptoms. I have reviewed the following from the nursing documentation.     Past Medical History:   Diagnosis Date    Acute and chronic respiratory failure (acute-on-chronic) (HCC)     Acute blood loss anemia 7/1/2020    Acute deep vein thrombosis (DVT) of brachial vein of left upper extremity ([address] 2/20/2019    Formatting of this note might be different from the original. Dx February 2019    Anxiety disorder     Atherosclerotic heart disease of native coronary artery without angina pectoris     Bleeding hemorrhoids 6/29/2020    Cerebellar infarct ([address] 9/29/2019    Cerebral infarction (HCC)     Chronic pain syndrome     Dependence on renal dialysis ([address]     Dependence on respirator ([address]     End stage renal disease ([address]     Gastro-esophageal reflux disease with esophagitis, without bleeding     Insomnia     Major depressive disorder, recurrent ([facility]     Morbid obesity due to excess calories (HCC)     Muscle weakness     Obstructive sleep apnea (adult) (pediatric)     Other hyperlipidemia     Other voice and resonance disorders     Personal history of COVID-19     Pulmonary hypertension ([facility]     Tracheostomy status ([facility]     Type 2 diabetes mellitus without complications ([facility]     Unspecified atrial fibrillation ([facility]      Past Surgical History:   Procedure Laterality Date    COLECTOMY N/A 5/10/2022    EXPLORATORY LAPAROTOMY, LYSIS OF ADHESIONS performed by Karen Mendoza MD at [address] [place] [address]     History reviewed. No pertinent family history.   Social History     Socioeconomic History    Marital status:      Spouse name: Not on file    Number of children: Not on file    Years of education: Not on file    Highest education level: Not on file   Occupational History    Not on file   Tobacco Use    Smoking status: Former    Smokeless tobacco: Never   Substance and Sexual Activity    Alcohol use: Never    Drug use: Never    Sexual activity: Not Currently   Other Topics Concern    Not on file   Social History Narrative    Not on file     Social Determinants of Health     Financial Resource Strain: Not on file   Food Insecurity: Not on file   Transportation Needs: Not on file   Physical Activity: Not on file   Stress: Not on file   Social Connections: Not on file   Intimate Partner Violence: Not on file   Housing Stability: Not on file     Current Facility-Administered Medications   Medication Dose Route Frequency Provider Last Rate Last Admin    insulin lispro (HUMALOG) injection vial 0-4 Units  0-4 Units SubCUTAneous TID  NIKKO Manzo        insulin lispro (HUMALOG) injection vial 0-4 Units  0-4 Units SubCUTAneous Nightly Shaq Romero NIKKO Galeana        glucose chewable tablet 16 g  4 tablet Oral PRN NIKKO Manzo        dextrose bolus 10% 125 mL  125 mL IntraVENous PRN NIKKO [place]        Or    dextrose bolus 10% 250 mL  250 mL IntraVENous PRN NIKKO Manzo        glucagon (rDNA) injection 1 mg  1 mg SubCUTAneous PRN NIKKO Manzo        dextrose 10 % infusion   IntraVENous Continuous PRN NIKKO [place]        [START ON 9/17/2022] epoetin claire-epbx (RETACRIT) injection 8,000 Units  8,000 Units IntraVENous Once per day on Tue Thu Sat Seema Welch MD        apixaban Arnaldo Lute) tablet 2.5 mg  2.5 mg Oral BID ITALO Odell CNP   2.5 mg at 09/16/22 2042    atorvastatin (LIPITOR) tablet 80 mg  80 mg Oral Nightly ITALO Odell CNP   80 mg at 09/16/22 2043    calcium acetate (PHOSLO) capsule 1,334 mg  1,334 mg Oral TID WC ITALO Odell CNP        aspirin EC tablet 81 mg  81 mg Oral Daily ITALO Odell CNP        chlorhexidine (PERIDEX) 0.12 % solution 15 mL  15 mL Mouth/Throat BID ITALO Odell CNP   15 mL at 09/16/22 2042    hydrocortisone (ANUSOL-HC) 2.5 % rectal cream   Rectal BID ITALO Odell CNP   Given at 09/16/22 2042    LORazepam (ATIVAN) tablet 0.5 mg  0.5 mg Oral Q12H PRN ITALO Odell CNP   0.5 mg at 09/16/22 2043    metoprolol tartrate (LOPRESSOR) tablet 25 mg  25 mg Oral BID ITALO Odell CNP   25 mg at 09/16/22 2043    midodrine (PROAMATINE) tablet 10 mg  10 mg Oral Once per day on Mon Wed Fri ITALO Odell CNP        pantoprazole (PROTONIX) tablet 40 mg  40 mg Oral QAM AC ITALO Odell CNP        oxyCODONE (ROXICODONE) immediate release tablet 5 mg  5 mg Oral BID ITALO Odell CNP        sertraline (ZOLOFT) tablet 50 mg  50 mg Oral Daily ITALO Odell CNP        traZODone (DESYREL) tablet 50 mg  50 mg Oral Nightly ITALO Odell CNP   50 mg at 09/16/22 2043    vitamin B-12 (CYANOCOBALAMIN) tablet 500 mcg  500 mcg Oral Daily Ardena Ormond, APRN - CNP        sodium chloride flush 0.9 % injection 5-40 mL  5-40 mL IntraVENous 2 times per day Ardena Ormond, APRN - CNP   10 mL at 09/16/22 2042    sodium chloride flush 0.9 % injection 5-40 mL  5-40 mL IntraVENous PRN Ardena Ormond, APRN - CNP        0.9 % sodium chloride infusion   IntraVENous PRN Ardena Ormond, APRN - CNP        ondansetron (ZOFRAN-ODT) disintegrating tablet 4 mg  4 mg Oral Q8H PRN Ardena Ormond, APRN - CNP        Or    ondansetron [facility]) injection 4 mg  4 mg IntraVENous Q6H PRN Ardena Ormond, APRN - CNP   4 mg at 09/16/22 0156    acetaminophen (TYLENOL) tablet 650 mg  650 mg Oral Q6H PRN Ardena Ormond, APRN - CNP        Or    acetaminophen (TYLENOL) suppository 650 mg  650 mg Rectal Q6H PRN Ardena Ormond, APRN - CNP        polyethylene glycol (GLYCOLAX) packet 17 g  17 g Oral Daily PRN Ardena Ormond, APRN - CNP        regadenoson (LEXISCAN) injection 0.4 mg  0.4 mg IntraVENous ONCE PRN Ardena Ormond, APRN - CNP        ipratropium-albuterol (DUONEB) nebulizer solution 3 mL  1 vial Inhalation 4x daily Yocasta Castillo MD   3 mL at 09/16/22 1946     Allergies   Allergen Reactions    Haloperidol Lactate Other (See Comments)     PT DOESN'T REMEMBER      Ziprasidone Hcl Other (See Comments)     PT DOESN'T REMEMBER         REVIEW OF SYSTEMS  10 systems reviewed, pertinent positives per HPI otherwise noted to be negative. PHYSICAL EXAM  /63   Pulse 82   Temp 98.7 °F (37.1 °C) (Oral)   Resp 18   Ht 5' 4\" (1.626 m)   Wt 279 lb 3.2 oz (126.6 kg)   SpO2 100%   BMI 47.92 kg/m²    Physical exam:  General appearance: awake and cooperative. no distress. Chronically ill appearing. Skin: Warm and dry. No rashes or lesions. HENT: Normocephalic. Atraumatic. Mucus membranes are moist   Neck: supple. Tracheostomy in place with clean dry dressing   Eyes: CARLO. EOM intact. Heart: RRR. No murmurs. Lungs: Respirations unlabored. CTAB. No wheezes, rales, or rhonchi.  Good air exchange  Abdomen: No tenderness. Soft. Non distended. No peritoneal signs. Rectal exam reveals no evidence of bleeding or hemorrhoids   Musculoskeletal: 2+ pitting edema to bilateral LE. Compartments soft. No deformity. No tenderness in the extremities. All extremities neurovascularly intact. Radial, Dp, and PT pulses +2/4 bilaterally  Neurological: Alert and oriented. No focal deficits. No aphasia or dysarthria. Psychiatric: Normal mood and affect. LABS  I have reviewed all labs for this visit.    Results for orders placed or performed during the hospital encounter of 09/15/22   COVID-19, Rapid    Specimen: Nasopharyngeal Swab   Result Value Ref Range    SARS-CoV-2, NAAT Not Detected Not Detected   Basic Metabolic Panel   Result Value Ref Range    Sodium 136 136 - 145 mmol/L    Potassium 4.9 3.5 - 5.1 mmol/L    Chloride 94 (L) 99 - 110 mmol/L    CO2 29 21 - 32 mmol/L    Anion Gap 13 3 - 16    Glucose 144 (H) 70 - 99 mg/dL    BUN 41 (H) 7 - 20 mg/dL    Creatinine 4.2 (H) 0.6 - 1.2 mg/dL    GFR Non-African American 10 (A) >60    GFR  13 (A) >60    Calcium 9.3 8.3 - 10.6 mg/dL   Troponin   Result Value Ref Range    Troponin 0.24 (H) <0.01 ng/mL   CBC with Auto Differential   Result Value Ref Range    WBC 9.0 4.0 - 11.0 K/uL    RBC 4.13 4.00 - 5.20 M/uL    Hemoglobin 12.3 12.0 - 16.0 g/dL    Hematocrit 39.1 36.0 - 48.0 %    MCV 94.7 80.0 - 100.0 fL    MCH 29.8 26.0 - 34.0 pg    MCHC 31.5 31.0 - 36.0 g/dL    RDW 18.5 (H) 12.4 - 15.4 %    Platelets 521 798 - 306 K/uL    MPV 7.4 5.0 - 10.5 fL    Neutrophils % 85.1 %    Lymphocytes % 7.2 %    Monocytes % 5.9 %    Eosinophils % 0.8 %    Basophils % 1.0 %    Neutrophils Absolute 7.7 1.7 - 7.7 K/uL    Lymphocytes Absolute 0.6 (L) 1.0 - 5.1 K/uL    Monocytes Absolute 0.5 0.0 - 1.3 K/uL    Eosinophils Absolute 0.1 0.0 - 0.6 K/uL    Basophils Absolute 0.1 0.0 - 0.2 K/uL   Troponin   Result Value Ref Range    Troponin 0.21 (H) <0.01 ng/mL   Troponin Result Value Ref Range    Troponin 0.24 (H) <0.01 ng/mL   Troponin   Result Value Ref Range    Troponin 0.23 (H) <0.01 ng/mL   CBC   Result Value Ref Range    WBC 7.2 4.0 - 11.0 K/uL    RBC 3.47 (L) 4.00 - 5.20 M/uL    Hemoglobin 10.3 (L) 12.0 - 16.0 g/dL    Hematocrit 32.6 (L) 36.0 - 48.0 %    MCV 93.9 80.0 - 100.0 fL    MCH 29.6 26.0 - 34.0 pg    MCHC 31.5 31.0 - 36.0 g/dL    RDW 17.8 (H) 12.4 - 15.4 %    Platelets 278 013 - 661 K/uL    MPV 8.1 5.0 - 10.5 fL   Lipid panel - fasting   Result Value Ref Range    Cholesterol, Total 85 0 - 199 mg/dL    Triglycerides 140 0 - 150 mg/dL    HDL 23 (L) 40 - 60 mg/dL    LDL Calculated 34 <100 mg/dL    VLDL Cholesterol Calculated 28 Not Established mg/dL   Comprehensive Metabolic Panel w/ Reflex to MG   Result Value Ref Range    Sodium 136 136 - 145 mmol/L    Potassium reflex Magnesium 5.1 3.5 - 5.1 mmol/L    Chloride 96 (L) 99 - 110 mmol/L    CO2 28 21 - 32 mmol/L    Anion Gap 12 3 - 16    Glucose 128 (H) 70 - 99 mg/dL    BUN 55 (H) 7 - 20 mg/dL    Creatinine 5.1 (HH) 0.6 - 1.2 mg/dL    GFR Non-African American 8 (A) >60    GFR  10 (A) >60    Calcium 8.8 8.3 - 10.6 mg/dL    Total Protein 7.0 6.4 - 8.2 g/dL    Albumin 3.3 (L) 3.4 - 5.0 g/dL    Albumin/Globulin Ratio 0.9 (L) 1.1 - 2.2    Total Bilirubin 0.6 0.0 - 1.0 mg/dL    Alkaline Phosphatase 100 40 - 129 U/L    ALT 8 (L) 10 - 40 U/L    AST 8 (L) 15 - 37 U/L   POCT Glucose   Result Value Ref Range    POC Glucose 104 (H) 70 - 99 mg/dl    Performed on ACCU-CHEK    POCT Glucose   Result Value Ref Range    POC Glucose 95 70 - 99 mg/dl    Performed on ACCU-[employer]    POCT Glucose   Result Value Ref Range    POC Glucose 85 70 - 99 mg/dl    Performed on ACCU-CHEK    POCT Glucose   Result Value Ref Range    POC Glucose 152 (H) 70 - 99 mg/dl    Performed on ACCU-CHEK    EKG 12 Lead   Result Value Ref Range    Ventricular Rate 85 BPM    Atrial Rate 85 BPM    P-R Interval 252 ms    QRS Duration 78 ms    Q-T Interval 386 ms    QTc Calculation (Bazett) 459 ms    P Axis 47 degrees    R Axis 1 degrees    T Axis 22 degrees    Diagnosis       Sinus rhythm with 1st degree A-V blockCannot rule out Anterior infarct , age undeterminedNonspecific ST abnormalityAbnormal ECGWhen compared with ECG of 09-MAY-2022 05:33,Sinus rhythm has replaced Atrial fibrillationConfirmed by Wilbert Carrasquillo ([id number]) on 9/15/2022 2:07:07 PM     EKG 12 lead   Result Value Ref Range    Ventricular Rate 72 BPM    Atrial Rate 72 BPM    P-R Interval 244 ms    QRS Duration 78 ms    Q-T Interval 428 ms    QTc Calculation (Bazett) 468 ms    P Axis 62 degrees    R Axis -11 degrees    T Axis 23 degrees    Diagnosis       Sinus rhythm with 1st degree A-V blockAnterior infarct (cited on or before 15-SEP-2022)Abnormal ECGWhen compared with ECG of 15-SEP-2022 11:17,Nonspecific T wave abnormality no longer evident in Lateral leadsConfirmed by Wilbert Carrasquillo ([id number]) on 9/16/2022 7:32:29 PM         ECG  The Ekg interpreted by me shows  normal sinus rhythm with a rate of 85  Axis is   Normal  QTc is  within an acceptable range  Intervals and Durations are unremarkable. ST Segments: nonspecific ST abnormality no acute ischemia       RADIOLOGY  XR CHEST PORTABLE    Result Date: 9/15/2022  EXAMINATION: ONE XRAY VIEW OF THE CHEST 9/15/2022 12:40 pm COMPARISON: May 19, 2022 HISTORY: ORDERING SYSTEM PROVIDED HISTORY: cp TECHNOLOGIST PROVIDED HISTORY: Reason for exam:->cp Reason for Exam: chest pain FINDINGS: The cardiomediastinal silhouette is moderately enlarged, stable. Tracheostomy appears adequately positioned. Lung volumes are low. No evidence of pulmonary edema or acute focal infiltrate. No pleural effusion or pneumothorax. No subdiaphragmatic free air. Cardiomegaly and low lung volumes. No acute cardiopulmonary process.      VL Extremity Venous Right    Result Date: 9/16/2022  Upper Extremities Veins  Demographics   Patient Name       Mike García   Date of Study 09/16/2022         Gender              Female   Patient Number     [id number]         Date of Birth       1950   Visit Number       [id number]          Age                 71 year(s)   Accession Number   [id number]         Room Number            Corporate ID       [id number]           Sonographer         David Kong RVT,                                                            [facility]   Ordering Physician Jemma Wills,  Interpreting        [facility]                     [place]                 Physician           Readers                                                            Tesfaye Hanson MD  Procedure Type of Study:   Veins:Upper Extremities Veins, VL EXTREMITY VENOUS DUPLEX RIGHT. Vascular Sonographer Report  Additional Indications:Evaluate for DVT during current hospitalization. Swelling and pain right arm/hand. Patient states has been \"swelling up and down for years. \" Venous Duplex Scan: B-mode imaging of the deep and superficial veins, with compression maneuvers, including color and Doppler spectral analysis. Impressions Right Impression Technically difficult and limited study due to body habitus, trach. strap and positioning. There is no evidence of acute deep or superficial venous thrombosis involving the right upper extremity. There has a patent brachial artery to cephalic vein arteriovenous fistula. Limited sampling obtained within venous Duplex exam (See comments). Dilated area at distal upper arm AVF measuring 17 mm. There is evidence of age indeterminate thrombus involving the right cephalic vein (AVF) at the mid upper arm at an area of tortuosity. Left Impression There is no evidence of deep venous thrombosis involving the left subclavian vein.  Conclusions   Summary   Within the limits of this exam, there is no evidence of deep or superficial  venous thrombosis involving the right upper extremity or left subclavian vein.  Patent right brachial artery to cephalic vein AVF. Limited exam.   Signature   ------------------------------------------------------------------  Electronically signed by Adelia Harrington MD (Interpreting  physician) on 09/16/2022 at 06:42 PM  ------------------------------------------------------------------  Patient Status:STAT. Study [address] [place] [address] - Vascular Lab. Technical Quality:Poor visualization due to body habitus. Risk Factors History +---------+----------+-------------------------------------------------------+ ! Diagnosis! Date      ! Comments                                               ! +---------+----------+-------------------------------------------------------+ ! Other    !09/16/2022! Distal anast. brachial to cephalic @ [facility]: 908/97.6     ! !         !          !cm/s, Diameter 4.5 mm, FV 2556 cc/min                  ! !         !          !mid upper arm: 138/47.6 cm/s, Diameter 10.0 mm,  ! !         !          !cc/min                                                 ! !         !          !shoulder: 79.3/30.5 cm/s, Diameter 8.2 mm,       ! !         !          !cc/min                                                 ! !         !          !cephalic junction with axillary/subclavian: 79.3/30.5  ! !         !          !cm/s, Diameter 8.2 mm,  cc/min                   ! +---------+----------+-------------------------------------------------------+   - The patient's risk factor(s) include: renal failure currently treated with     dialysis, dyslipidemia, obesity and arterial hypertension.   - The patient has a former tobacco history. Velocities are measured in cm/s ; Diameters are measured in mm Right UE Vein Measurements 2D Measurements +--------+----------+---------------+----------+ ! Location! Visualized! Compressibility! Thrombosis! +--------+----------+---------------+----------+ ! IJV     ! Partial   !Yes            ! None      ! +--------+----------+---------------+----------+ ! SCV     ! Partial   !Yes            ! None      ! +--------+----------+---------------+----------+ ! Axillary! Partial   !Yes            ! None      ! +--------+----------+---------------+----------+ ! Brachial!Partial   !Yes            ! None      ! +--------+----------+---------------+----------+ ! Radial  !Yes       ! Yes            ! None      ! +--------+----------+---------------+----------+ ! Ulnar   ! Yes       ! Yes            ! None      ! +--------+----------+---------------+----------+ ! Basilic ! Partial   !Yes            ! None      ! +--------+----------+---------------+----------+ ! Cephalic! Yes       ! Yes            ! None      ! +--------+----------+---------------+----------+ Left UE Vein Measurements 2D Measurements +--------+----------+---------------+----------+ ! Location! Visualized! Compressibility! Thrombosis! +--------+----------+---------------+----------+ ! SCV     ! Yes       ! Yes            ! None      ! +--------+----------+---------------+----------+ Doppler Measurements +--------+------+------+ ! Location! Signal!Reflux! +--------+------+------+ ! SCV     ! Phasic!      ! +--------+------+------+     No results found. Is this patient to be included in the SEP-1 Core Measure due to severe sepsis or septic shock? No   Exclusion criteria - the patient is NOT to be included for SEP-1 Core Measure due to: Infection is not suspected        ED COURSE/MDM  Patient seen and evaluated. Old records reviewed. Labs and imaging reviewed and results discussed with patient. This is a 69-year-old female with multiple medical problems presenting with an episode of chest pain and shortness of breath that she had at dialysis. Her EKG is nonischemic. Troponin is elevated at 0.24 this appears to be her baseline, she is denying any chest pain on arrival here. I held off on that she takes Eliquis and states she has been compliant with this.   I do not see that she has had a recent stress test, catheterization or cardiac work-up. Her chest pain today was concerning for ACS as there was associated shortness of breath and it was a pressure-like sensation. She has multiple risk factors for CAD. Therefore she will be admitted for further treatment. Otherwise blood work is unremarkable, troponin actually down trended to 0.21 which is reassuring. She is not requiring oxygen through trach and I do not suspect PE. The patient is admitted for further treatment.     During the patient's ED course, the patient was given:  Medications   apixaban (ELIQUIS) tablet 2.5 mg (2.5 mg Oral Given 9/16/22 2042)   atorvastatin (LIPITOR) tablet 80 mg (80 mg Oral Given 9/16/22 2043)   calcium acetate (PHOSLO) capsule 1,334 mg (1,334 mg Oral Not Given 9/16/22 1714)   aspirin EC tablet 81 mg (81 mg Oral Not Given 9/16/22 0920)   chlorhexidine (PERIDEX) 0.12 % solution 15 mL (15 mLs Mouth/Throat Given 9/16/22 2042)   hydrocortisone (ANUSOL-HC) 2.5 % rectal cream ( Rectal Given 9/16/22 2042)   LORazepam (ATIVAN) tablet 0.5 mg (0.5 mg Oral Given 9/16/22 2043)   metoprolol tartrate (LOPRESSOR) tablet 25 mg (25 mg Oral Given 9/16/22 2043)   midodrine (PROAMATINE) tablet 10 mg (10 mg Oral Not Given 9/16/22 0921)   pantoprazole (PROTONIX) tablet 40 mg (40 mg Oral Not Given 9/16/22 0544)   oxyCODONE (ROXICODONE) immediate release tablet 5 mg (5 mg Oral Not Given 9/16/22 1714)   sertraline (ZOLOFT) tablet 50 mg (50 mg Oral Not Given 9/16/22 0922)   traZODone (DESYREL) tablet 50 mg (50 mg Oral Given 9/16/22 2043)   vitamin B-12 (CYANOCOBALAMIN) tablet 500 mcg (500 mcg Oral Not Given 9/16/22 8744)   sodium chloride flush 0.9 % injection 5-40 mL (10 mLs IntraVENous Given 9/16/22 2042)   sodium chloride flush 0.9 % injection 5-40 mL (has no administration in time range)   0.9 % sodium chloride infusion (has no administration in time range)   ondansetron (ZOFRAN-ODT) disintegrating tablet 4 mg ( Oral See Alternative 9/16/22 0156)     Or   ondansetron (ZOFRAN) injection 4 mg (4 mg IntraVENous Given 9/16/22 0156)   acetaminophen (TYLENOL) tablet 650 mg (has no administration in time range)     Or   acetaminophen (TYLENOL) suppository 650 mg (has no administration in time range)   polyethylene glycol (GLYCOLAX) packet 17 g (has no administration in time range)   regadenoson (LEXISCAN) injection 0.4 mg (has no administration in time range)   ipratropium-albuterol (DUONEB) nebulizer solution 3 mL (3 mLs Inhalation Given 9/16/22 1946)   insulin lispro (HUMALOG) injection vial 0-4 Units (0 Units SubCUTAneous Not Given 9/16/22 1714)   insulin lispro (HUMALOG) injection vial 0-4 Units (0 Units SubCUTAneous Not Given 9/16/22 2043)   glucose chewable tablet 16 g (has no administration in time range)   dextrose bolus 10% 125 mL (has no administration in time range)     Or   dextrose bolus 10% 250 mL (has no administration in time range)   glucagon (rDNA) injection 1 mg (has no administration in time range)   dextrose 10 % infusion (has no administration in time range)   epoetin claire-epbx (RETACRIT) injection 8,000 Units (has no administration in time range)   oxyCODONE (ROXICODONE) immediate release tablet 5 mg (5 mg Oral Given 9/15/22 1323)   diphenhydrAMINE (BENADRYL) tablet 12.5 mg (12.5 mg Oral Given 9/15/22 1539)        CLINICAL IMPRESSION  1. Chest pain, unspecified type    2. ESRD on hemodialysis ([facility]    3. Rectal pain        Blood pressure 138/63, pulse 82, temperature 98.7 °F (37.1 °C), temperature source Oral, resp. rate 18, height 5' 4\" (1.626 m), weight 279 lb 3.2 oz (126.6 kg), SpO2 100 %. Patient was given scripts for the following medications. I counseled patient how to take these medications. Current Discharge Medication List          Follow-up with:  No follow-up provider specified. DISCLAIMER: This chart was created using Dragon dictation software.   Efforts were made by me to ensure accuracy, however some errors may be present due to limitations of this technology and occasionally words are not transcribed correctly.        Maldonado Garza  09/16/22 0348

## 2022-09-17 NOTE — PROGRESS NOTES
Treatment time: 3.5 hours  Net UF: *** ml     Pre weight: 110.7 kg  Post weight:*** kg  EDW: 109 kg    Crit Line Used: yes  Ending Profile: ***  Refill Present: ***    Access used: R chest TDC    Access function: well with  ml/min     Medications or blood products given: heparin dwells     Regular outpatient schedule: TTS     Summary of response to treatment: Patient tolerated treatment well and without any complications. Patient remained stable throughout entire treatment.  a

## 2022-09-17 NOTE — PROGRESS NOTES
Dialysis Nurse called for patient to be suctioned. No suction supplies found in room. Called RT for suction kits. RT took over set up and suctioning patient.

## 2022-09-17 NOTE — PROGRESS NOTES
Patient still reporting feeling anxious. Patient states she feels like she is choking. Patient is stable with SpO2 at 100%. PRN Lorazepam 0.5mg oral given.

## 2022-09-17 NOTE — PROGRESS NOTES
09/17/22 0200   RT Protocol   History Pulmonary Disease 2   Respiratory pattern 0   Breath sounds 2   Cough 0   Indications for Bronchodilator Therapy Decreased or absent breath sounds   Bronchodilator Assessment Score 4   RT Inhaler-Nebulizer Bronchodilator Protocol Note    There is a bronchodilator order in the chart from a provider indicating to follow the RT Bronchodilator Protocol and there is an Initiate RT Inhaler-Nebulizer Bronchodilator Protocol order as well (see protocol at bottom of note). CXR Findings:  XR CHEST PORTABLE    Result Date: 9/15/2022  Cardiomegaly and low lung volumes. No acute cardiopulmonary process. The findings from the last RT Protocol Assessment were as follows:   History Pulmonary Disease: Chronic pulmonary disease  Respiratory Pattern: Regular pattern and RR 12-20 bpm  Breath Sounds: Slightly diminished and/or crackles  Cough: Strong, spontaneous, non-productive  Indication for Bronchodilator Therapy: Decreased or absent breath sounds  Bronchodilator Assessment Score: 4    Aerosolized bronchodilator medication orders have been revised according to the RT Inhaler-Nebulizer Bronchodilator Protocol below. Respiratory Therapist to perform RT Therapy Protocol Assessment initially then follow the protocol. Repeat RT Therapy Protocol Assessment PRN for score 0-3 or on second treatment, BID, and PRN for scores above 3. No Indications - adjust the frequency to every 6 hours PRN wheezing or bronchospasm, if no treatments needed after 48 hours then discontinue using Per Protocol order mode. If indication present, adjust the RT bronchodilator orders based on the Bronchodilator Assessment Score as indicated below.   Use Inhaler orders unless patient has one or more of the following: on home nebulizer, not able to hold breath for 10 seconds, is not alert and oriented, cannot activate and use MDI correctly, or respiratory rate 25 breaths per minute or more, then use the equivalent nebulizer order(s) with same Frequency and PRN reasons based on the score. If a patient is on this medication at home then do not decrease Frequency below that used at home. 0-3 - enter or revise RT bronchodilator order(s) to equivalent RT Bronchodilator order with Frequency of every 4 hours PRN for wheezing or increased work of breathing using Per Protocol order mode. 4-6 - enter or revise RT Bronchodilator order(s) to two equivalent RT bronchodilator orders with one order with BID Frequency and one order with Frequency of every 4 hours PRN wheezing or increased work of breathing using Per Protocol order mode. 7-10 - enter or revise RT Bronchodilator order(s) to two equivalent RT bronchodilator orders with one order with TID Frequency and one order with Frequency of every 4 hours PRN wheezing or increased work of breathing using Per Protocol order mode. 11-13 - enter or revise RT Bronchodilator order(s) to one equivalent RT bronchodilator order with QID Frequency and an Albuterol order with Frequency of every 4 hours PRN wheezing or increased work of breathing using Per Protocol order mode. Greater than 13 - enter or revise RT Bronchodilator order(s) to one equivalent RT bronchodilator order with every 4 hours Frequency and an Albuterol order with Frequency of every 2 hours PRN wheezing or increased work of breathing using Per Protocol order mode.        Electronically signed by Los Desouza RCP on 9/17/2022 at 2:46 AM

## 2022-09-17 NOTE — PLAN OF CARE
Problem: Discharge Planning  Goal: Discharge to home or other facility with appropriate resources  Outcome: Progressing     Problem: Pain  Goal: Verbalizes/displays adequate comfort level or baseline comfort level  Outcome: Progressing     Problem: Safety - Adult  Goal: Free from fall injury  Outcome: Progressing  Flowsheets (Taken 9/17/2022 0901)  Free From Fall Injury: Instruct family/caregiver on patient safety     Problem: ABCDS Injury Assessment  Goal: Absence of physical injury  Outcome: Progressing  Flowsheets (Taken 9/17/2022 0901)  Absence of Physical Injury: Implement safety measures based on patient assessment     Problem: Skin/Tissue Integrity  Goal: Absence of new skin breakdown  Description: 1. Monitor for areas of redness and/or skin breakdown  2. Assess vascular access sites hourly  3. Every 4-6 hours minimum:  Change oxygen saturation probe site  4. Every 4-6 hours:  If on nasal continuous positive airway pressure, respiratory therapy assess nares and determine need for appliance change or resting period.   Outcome: Progressing     Problem: Chronic Conditions and Co-morbidities  Goal: Patient's chronic conditions and co-morbidity symptoms are monitored and maintained or improved  Outcome: Progressing

## 2022-09-18 ENCOUNTER — APPOINTMENT (OUTPATIENT)
Dept: NUCLEAR MEDICINE | Age: 72
End: 2022-09-18
Payer: COMMERCIAL

## 2022-09-18 LAB
ANION GAP SERPL CALCULATED.3IONS-SCNC: 13 MMOL/L (ref 3–16)
BASOPHILS ABSOLUTE: 0 K/UL (ref 0–0.2)
BASOPHILS RELATIVE PERCENT: 0.5 %
BUN BLDV-MCNC: 43 MG/DL (ref 7–20)
CALCIUM SERPL-MCNC: 8.6 MG/DL (ref 8.3–10.6)
CHLORIDE BLD-SCNC: 93 MMOL/L (ref 99–110)
CO2: 25 MMOL/L (ref 21–32)
CREAT SERPL-MCNC: 5.1 MG/DL (ref 0.6–1.2)
EOSINOPHILS ABSOLUTE: 0.2 K/UL (ref 0–0.6)
EOSINOPHILS RELATIVE PERCENT: 3.3 %
GFR AFRICAN AMERICAN: 10
GFR NON-AFRICAN AMERICAN: 8
GLUCOSE BLD-MCNC: 103 MG/DL (ref 70–99)
GLUCOSE BLD-MCNC: 119 MG/DL (ref 70–99)
GLUCOSE BLD-MCNC: 119 MG/DL (ref 70–99)
GLUCOSE BLD-MCNC: 168 MG/DL (ref 70–99)
HCT VFR BLD CALC: 31.2 % (ref 36–48)
HEMOGLOBIN: 9.7 G/DL (ref 12–16)
LYMPHOCYTES ABSOLUTE: 1.2 K/UL (ref 1–5.1)
LYMPHOCYTES RELATIVE PERCENT: 17.1 %
MCH RBC QN AUTO: 29.7 PG (ref 26–34)
MCHC RBC AUTO-ENTMCNC: 31 G/DL (ref 31–36)
MCV RBC AUTO: 95.9 FL (ref 80–100)
MONOCYTES ABSOLUTE: 0.6 K/UL (ref 0–1.3)
MONOCYTES RELATIVE PERCENT: 9 %
NEUTROPHILS ABSOLUTE: 4.8 K/UL (ref 1.7–7.7)
NEUTROPHILS RELATIVE PERCENT: 70.1 %
PDW BLD-RTO: 17.4 % (ref 12.4–15.4)
PERFORMED ON: ABNORMAL
PLATELET # BLD: 175 K/UL (ref 135–450)
PMV BLD AUTO: 8.1 FL (ref 5–10.5)
POTASSIUM REFLEX MAGNESIUM: 4.7 MMOL/L (ref 3.5–5.1)
RBC # BLD: 3.25 M/UL (ref 4–5.2)
SODIUM BLD-SCNC: 131 MMOL/L (ref 136–145)
WBC # BLD: 6.9 K/UL (ref 4–11)

## 2022-09-18 PROCEDURE — G0378 HOSPITAL OBSERVATION PER HR: HCPCS

## 2022-09-18 PROCEDURE — 94761 N-INVAS EAR/PLS OXIMETRY MLT: CPT

## 2022-09-18 PROCEDURE — 2500000003 HC RX 250 WO HCPCS: Performed by: NURSE PRACTITIONER

## 2022-09-18 PROCEDURE — A9502 TC99M TETROFOSMIN: HCPCS | Performed by: NURSE PRACTITIONER

## 2022-09-18 PROCEDURE — 93017 CV STRESS TEST TRACING ONLY: CPT

## 2022-09-18 PROCEDURE — 94640 AIRWAY INHALATION TREATMENT: CPT

## 2022-09-18 PROCEDURE — 6370000000 HC RX 637 (ALT 250 FOR IP): Performed by: NURSE PRACTITIONER

## 2022-09-18 PROCEDURE — 2580000003 HC RX 258: Performed by: NURSE PRACTITIONER

## 2022-09-18 PROCEDURE — 6370000000 HC RX 637 (ALT 250 FOR IP): Performed by: INTERNAL MEDICINE

## 2022-09-18 PROCEDURE — 2700000000 HC OXYGEN THERAPY PER DAY

## 2022-09-18 PROCEDURE — 3430000000 HC RX DIAGNOSTIC RADIOPHARMACEUTICAL: Performed by: NURSE PRACTITIONER

## 2022-09-18 PROCEDURE — 85025 COMPLETE CBC W/AUTO DIFF WBC: CPT

## 2022-09-18 PROCEDURE — 36415 COLL VENOUS BLD VENIPUNCTURE: CPT

## 2022-09-18 PROCEDURE — 80048 BASIC METABOLIC PNL TOTAL CA: CPT

## 2022-09-18 RX ADMIN — TRAZODONE HYDROCHLORIDE 50 MG: 50 TABLET ORAL at 22:27

## 2022-09-18 RX ADMIN — IPRATROPIUM BROMIDE AND ALBUTEROL SULFATE 3 ML: 2.5; .5 SOLUTION RESPIRATORY (INHALATION) at 19:34

## 2022-09-18 RX ADMIN — CALCIUM ACETATE 1334 MG: 667 CAPSULE ORAL at 08:16

## 2022-09-18 RX ADMIN — APIXABAN 2.5 MG: 5 TABLET, FILM COATED ORAL at 22:28

## 2022-09-18 RX ADMIN — CHLORHEXIDINE GLUCONATE 0.12% ORAL RINSE 15 ML: 1.2 LIQUID ORAL at 22:27

## 2022-09-18 RX ADMIN — CALCIUM ACETATE 1334 MG: 667 CAPSULE ORAL at 17:11

## 2022-09-18 RX ADMIN — IPRATROPIUM BROMIDE AND ALBUTEROL SULFATE 3 ML: 2.5; .5 SOLUTION RESPIRATORY (INHALATION) at 15:03

## 2022-09-18 RX ADMIN — CYANOCOBALAMIN TAB 500 MCG 500 MCG: 500 TAB at 08:16

## 2022-09-18 RX ADMIN — ASPIRIN 81 MG: 81 TABLET, COATED ORAL at 08:16

## 2022-09-18 RX ADMIN — CALCIUM ACETATE 1334 MG: 667 CAPSULE ORAL at 22:28

## 2022-09-18 RX ADMIN — HYDROCORTISONE 2.5%: 25 CREAM TOPICAL at 22:33

## 2022-09-18 RX ADMIN — TETROFOSMIN 33.5 MILLICURIE: 1.38 INJECTION, POWDER, LYOPHILIZED, FOR SOLUTION INTRAVENOUS at 07:47

## 2022-09-18 RX ADMIN — IPRATROPIUM BROMIDE AND ALBUTEROL SULFATE 3 ML: 2.5; .5 SOLUTION RESPIRATORY (INHALATION) at 11:23

## 2022-09-18 RX ADMIN — METOPROLOL TARTRATE 25 MG: 25 TABLET, FILM COATED ORAL at 08:16

## 2022-09-18 RX ADMIN — METOPROLOL TARTRATE 25 MG: 25 TABLET, FILM COATED ORAL at 22:26

## 2022-09-18 RX ADMIN — HYDROCORTISONE 2.5%: 25 CREAM TOPICAL at 08:17

## 2022-09-18 RX ADMIN — PANTOPRAZOLE SODIUM 40 MG: 40 TABLET, DELAYED RELEASE ORAL at 06:26

## 2022-09-18 RX ADMIN — CHLORHEXIDINE GLUCONATE 0.12% ORAL RINSE 15 ML: 1.2 LIQUID ORAL at 08:16

## 2022-09-18 RX ADMIN — SERTRALINE HYDROCHLORIDE 50 MG: 50 TABLET ORAL at 08:16

## 2022-09-18 RX ADMIN — SODIUM CHLORIDE, PRESERVATIVE FREE 10 ML: 5 INJECTION INTRAVENOUS at 08:18

## 2022-09-18 RX ADMIN — OXYCODONE 5 MG: 5 TABLET ORAL at 17:11

## 2022-09-18 RX ADMIN — APIXABAN 2.5 MG: 5 TABLET, FILM COATED ORAL at 08:17

## 2022-09-18 RX ADMIN — OXYCODONE 5 MG: 5 TABLET ORAL at 08:16

## 2022-09-18 RX ADMIN — ATORVASTATIN CALCIUM 80 MG: 40 TABLET, FILM COATED ORAL at 22:27

## 2022-09-18 ASSESSMENT — PAIN SCALES - GENERAL
PAINLEVEL_OUTOF10: 7

## 2022-09-18 ASSESSMENT — PAIN DESCRIPTION - LOCATION: LOCATION: BACK

## 2022-09-18 NOTE — PROGRESS NOTES
RT Inhaler-Nebulizer Bronchodilator Protocol Note    There is a bronchodilator order in the chart from a provider indicating to follow the RT Bronchodilator Protocol and there is an Initiate RT Inhaler-Nebulizer Bronchodilator Protocol order as well (see protocol at bottom of note). CXR Findings:  No results found. The findings from the last RT Protocol Assessment were as follows:   History Pulmonary Disease: Chronic pulmonary disease  Respiratory Pattern: Regular pattern and RR 12-20 bpm  Breath Sounds: Slightly diminished and/or crackles  Cough: Strong, spontaneous, non-productive  Indication for Bronchodilator Therapy: Decreased or absent breath sounds  Bronchodilator Assessment Score: 4    Aerosolized bronchodilator medication orders have been revised according to the RT Inhaler-Nebulizer Bronchodilator Protocol below. Respiratory Therapist to perform RT Therapy Protocol Assessment initially then follow the protocol. Repeat RT Therapy Protocol Assessment PRN for score 0-3 or on second treatment, BID, and PRN for scores above 3. No Indications - adjust the frequency to every 6 hours PRN wheezing or bronchospasm, if no treatments needed after 48 hours then discontinue using Per Protocol order mode. If indication present, adjust the RT bronchodilator orders based on the Bronchodilator Assessment Score as indicated below. Use Inhaler orders unless patient has one or more of the following: on home nebulizer, not able to hold breath for 10 seconds, is not alert and oriented, cannot activate and use MDI correctly, or respiratory rate 25 breaths per minute or more, then use the equivalent nebulizer order(s) with same Frequency and PRN reasons based on the score. If a patient is on this medication at home then do not decrease Frequency below that used at home.     0-3 - enter or revise RT bronchodilator order(s) to equivalent RT Bronchodilator order with Frequency of every 4 hours PRN for wheezing or increased work of breathing using Per Protocol order mode. 4-6 - enter or revise RT Bronchodilator order(s) to two equivalent RT bronchodilator orders with one order with BID Frequency and one order with Frequency of every 4 hours PRN wheezing or increased work of breathing using Per Protocol order mode. 7-10 - enter or revise RT Bronchodilator order(s) to two equivalent RT bronchodilator orders with one order with TID Frequency and one order with Frequency of every 4 hours PRN wheezing or increased work of breathing using Per Protocol order mode. 11-13 - enter or revise RT Bronchodilator order(s) to one equivalent RT bronchodilator order with QID Frequency and an Albuterol order with Frequency of every 4 hours PRN wheezing or increased work of breathing using Per Protocol order mode. Greater than 13 - enter or revise RT Bronchodilator order(s) to one equivalent RT bronchodilator order with every 4 hours Frequency and an Albuterol order with Frequency of every 2 hours PRN wheezing or increased work of breathing using Per Protocol order mode.        Electronically signed by Marcial Wilks RCP on 9/18/2022 at 7:39 PM

## 2022-09-18 NOTE — PROGRESS NOTES
Shift assessment complete. Call light and bedside table within reach. Pt repositioned. No further needs at this time.  Electronically signed by Venessa Hartley RN on 9/17/2022 at 9:51 PM

## 2022-09-18 NOTE — PROGRESS NOTES
[facility] Progress Note           Subjective/   71y.o. year old female who we are seeing in consultation for ESKD on HD. HPI:  Last HD on 9/17 with 4.5 liters removed, post-weight of 122.5 kg. Hypotensive with HD, received prn albumin. ROS:  States still with some short of breath. +weak. Objective/   GEN:  Chronically ill, BP (!) 150/72   Pulse 73   Temp 98.6 °F (37 °C) (Axillary)   Resp 18   Ht 5' 4\" (1.626 m)   Wt 287 lb 11.2 oz (130.5 kg)   SpO2 100%   BMI 49.38 kg/m²   HEENT: non-icteric, no JVD  CV: S1, S2 without m/r/g; + LE edema  RESP: CTA B without w/r/r; breathing wnl  ABD: +bs, soft, nt, no hsm  SKIN: warm, no rashes  ACCESS: RUE AVF    Data/  Recent Labs     09/16/22 0417 09/17/22 0451 09/18/22  0414   WBC 7.2 6.1 6.9   HGB 10.3* 10.0* 9.7*   HCT 32.6* 31.2* 31.2*   MCV 93.9 94.3 95.9    192 175       Recent Labs     09/16/22 0417 09/17/22 0451 09/18/22  0415    135* 131*   K 5.1 5.3*  5.3* 4.7   CL 96* 95* 93*   CO2 28 27 25   GLUCOSE 128* 91 119*   PHOS  --  5.7*  --    BUN 55* 64* 43*   CREATININE 5.1* 6.1* 5.1*   LABGLOM 8* 7* 8*   GFRAA 10* 8* 10*         Assessment/     - End stage kidney disease - on HD Tues-Thurs-Sat     - NSTEMI - stress testing with no ischemia, possible inferior basal hypokinesis, intermediate risk     - Anemia of chronic disease - KOKO with HD     - Diastolic CHF     - Hypotension - midodrine with HD       Plan/     - HD per TTS schedule - agreeable to run again on Monday for further UF as tolerated, crit line monitoring, prn albumin,  kg  - KOKO with HD per OP orders  - Trend labs, bp's    ____________________________________  Kaitlin Arita MD  [facility]  [email or web address]  Office: [phone]

## 2022-09-18 NOTE — PROGRESS NOTES
RT Inhaler-Nebulizer Bronchodilator Protocol Note    There is a bronchodilator order in the chart from a provider indicating to follow the RT Bronchodilator Protocol and there is an Initiate RT Inhaler-Nebulizer Bronchodilator Protocol order as well (see protocol at bottom of note). CXR Findings:  No results found. The findings from the last RT Protocol Assessment were as follows:   History Pulmonary Disease: Chronic pulmonary disease  Respiratory Pattern: Regular pattern and RR 12-20 bpm  Breath Sounds: Slightly diminished and/or crackles  Cough: Strong, productive  Indication for Bronchodilator Therapy: Decreased or absent breath sounds  Bronchodilator Assessment Score: 5    Aerosolized bronchodilator medication orders have been revised according to the RT Inhaler-Nebulizer Bronchodilator Protocol below. Respiratory Therapist to perform RT Therapy Protocol Assessment initially then follow the protocol. Repeat RT Therapy Protocol Assessment PRN for score 0-3 or on second treatment, BID, and PRN for scores above 3. No Indications - adjust the frequency to every 6 hours PRN wheezing or bronchospasm, if no treatments needed after 48 hours then discontinue using Per Protocol order mode. If indication present, adjust the RT bronchodilator orders based on the Bronchodilator Assessment Score as indicated below. Use Inhaler orders unless patient has one or more of the following: on home nebulizer, not able to hold breath for 10 seconds, is not alert and oriented, cannot activate and use MDI correctly, or respiratory rate 25 breaths per minute or more, then use the equivalent nebulizer order(s) with same Frequency and PRN reasons based on the score. If a patient is on this medication at home then do not decrease Frequency below that used at home.     0-3 - enter or revise RT bronchodilator order(s) to equivalent RT Bronchodilator order with Frequency of every 4 hours PRN for wheezing or increased work of breathing using Per Protocol order mode. 4-6 - enter or revise RT Bronchodilator order(s) to two equivalent RT bronchodilator orders with one order with BID Frequency and one order with Frequency of every 4 hours PRN wheezing or increased work of breathing using Per Protocol order mode. 7-10 - enter or revise RT Bronchodilator order(s) to two equivalent RT bronchodilator orders with one order with TID Frequency and one order with Frequency of every 4 hours PRN wheezing or increased work of breathing using Per Protocol order mode. 11-13 - enter or revise RT Bronchodilator order(s) to one equivalent RT bronchodilator order with QID Frequency and an Albuterol order with Frequency of every 4 hours PRN wheezing or increased work of breathing using Per Protocol order mode. Greater than 13 - enter or revise RT Bronchodilator order(s) to one equivalent RT bronchodilator order with every 4 hours Frequency and an Albuterol order with Frequency of every 2 hours PRN wheezing or increased work of breathing using Per Protocol order mode. PATIENT WILL BENEFIT FROM TREATMENTS.      Electronically signed by Jose Brady RCP on 9/17/2022 at 8:21 PM

## 2022-09-18 NOTE — PROGRESS NOTES
End of shift report given to [facility]. Pt in stable condition, care transferred at this time.  Stress lab called and will be putting in transport for pt to be brought down for second part of her stress test. Electronically signed by Giuliana Doherty RN on 9/18/2022 at 7:02 AM

## 2022-09-18 NOTE — PROGRESS NOTES
Consult has been called to Dr. Erika Adorno on 9/18/22. Spoke with Barbra Montoya.  5:47 PM    Chucho Reyes  9/18/2022

## 2022-09-18 NOTE — PLAN OF CARE
Problem: Pain  Goal: Verbalizes/displays adequate comfort level or baseline comfort level  9/18/2022 0028 by Lou Singh RN  Outcome: Progressing

## 2022-09-18 NOTE — PROGRESS NOTES
Hospitalist Progress Note      PCP: Mejia White MD    Date of Admission: 9/15/2022    Subjective: CP better    Medications:  Reviewed    Infusion Medications    dextrose      sodium chloride       Scheduled Medications    insulin lispro  0-4 Units SubCUTAneous TID WC    insulin lispro  0-4 Units SubCUTAneous Nightly    epoetin claire-epbx  8,000 Units IntraVENous Once per day on Tue Thu Sat    apixaban  2.5 mg Oral BID    atorvastatin  80 mg Oral Nightly    calcium acetate  1,334 mg Oral TID WC    aspirin  81 mg Oral Daily    chlorhexidine  15 mL Mouth/Throat BID    hydrocortisone   Rectal BID    metoprolol tartrate  25 mg Oral BID    midodrine  10 mg Oral Once per day on Mon Wed Fri    pantoprazole  40 mg Oral QAM AC    oxyCODONE  5 mg Oral BID    sertraline  50 mg Oral Daily    traZODone  50 mg Oral Nightly    vitamin B-12  500 mcg Oral Daily    sodium chloride flush  5-40 mL IntraVENous 2 times per day    ipratropium-albuterol  1 vial Inhalation 4x daily     PRN Meds: glucose, dextrose bolus **OR** dextrose bolus, glucagon (rDNA), dextrose, LORazepam, sodium chloride flush, sodium chloride, ondansetron **OR** ondansetron, acetaminophen **OR** acetaminophen, polyethylene glycol      Intake/Output Summary (Last 24 hours) at 9/18/2022 1734  Last data filed at 9/18/2022 0208  Gross per 24 hour   Intake 180 ml   Output 0 ml   Net 180 ml       Physical Exam Performed:    BP (!) 107/55   Pulse 64   Temp 97.2 °F (36.2 °C) (Oral)   Resp 18   Ht 5' 4\" (1.626 m)   Wt 287 lb 11.2 oz (130.5 kg)   SpO2 100%   BMI 49.38 kg/m²        Gen: No distress. Alert. Pleasant obese  [place] female   Eyes: PERRL. No sclera icterus. No conjunctival injection. ENT: No discharge. Pharynx clear. +tracheostomy   Neck: No JVD. No Carotid Bruit. Trachea midline. Resp: No accessory muscle use. No crackles. No wheezes. No rhonchi. CV: Regular rate. Regular rhythm. No murmur. No rub. No edema.    Peripheral Pulses: +2 palpable, equal bilaterally   GI: Non-tender. Non-distended. No masses. No organomegaly. Normal bowel sounds. No hernia. Skin: Warm and dry. No nodule on exposed extremities. No rash on exposed extremities. ++right arm swelling extending down into her hand,+fistula, + cannot detect great pulses 2/2 to edema   M/S: No cyanosis. No joint deformity. No clubbing. Neuro: Awake. Grossly nonfocal    Psych: Oriented x 3. No anxiety or agitation. Labs:   Recent Labs     09/16/22 0417 09/17/22 0451 09/18/22 0414   WBC 7.2 6.1 6.9   HGB 10.3* 10.0* 9.7*   HCT 32.6* 31.2* 31.2*    192 175     Recent Labs     09/16/22 0417 09/17/22 0451 09/18/22 0415    135* 131*   K 5.1 5.3*  5.3* 4.7   CL 96* 95* 93*   CO2 28 27 25   BUN 55* 64* 43*   CREATININE 5.1* 6.1* 5.1*   CALCIUM 8.8 8.3 8.6   PHOS  --  5.7*  --      Recent Labs     09/16/22 0417   AST 8*   ALT 8*   BILITOT 0.6   ALKPHOS 100     No results for input(s): INR in the last 72 hours. Recent Labs     09/15/22  2236 09/16/22 0417   TROPONINI 0.24* 0.23*       Urinalysis:    No results found for: Luvenia Mary Ann, BACTERIA, RBCUA, BLOODU, SPECGRAV, GLUCOSEU    Radiology:  NM Cardiac Stress Test Nuclear Imaging   Final Result      VL Extremity Venous Right   Final Result      XR CHEST PORTABLE   Final Result   Cardiomegaly and low lung volumes. No acute cardiopulmonary process.                  Assessment/Plan:    Active Hospital Problems    Diagnosis     Chest pain [R07.9]      Priority: Medium    ESRD on dialysis ([facility] [address] [N18.6, Z99.2]     Chronic respiratory failure requiring continuous mechanical ventilation through tracheostomy ([facility] [address] [J96.10, Z93.0, Z99.11]     History of CVA (cerebrovascular accident) [Z86.73]     GERD (gastroesophageal reflux disease) [K21.9]     Anemia, chronic disease [D63.8]     SILVER (obstructive sleep apnea) [G47.33]     Essential hypertension [I10]             #Chest pain   -trop elevated but trending down likely 2/2 to ESRD  -EKG without acute ischemic changes   -hx of CAD on ASA, statin, and BB   -NPO   -CXR neg   -chemical stress test reviewed with fixed defect - pt states she has never had MI prior  - will consult cardio for recs     #Right arm swelling   -needs fistulogram outpatient   -doppler neg for DVT  -elevate arm      #ESRD   -on HD Tues Thurs Sat  -continue home meds   -nephrology consulted      #Chronic diastolic HF   -last echo from 2/17/22 as above   -no s/s of acute decompensation      #Chronic respiratory failure   -s/p tracheostomy more than 1 year ago   -currently on trach collar with humidified air      #Atrial fibrillation   -NSR on presentation   -rate controlled   -on eliquis for [facility]      #Chronic Anemia   -Hgb stable  -likely 2/2 to ESRD  -on B12 as well      #Hx of VTE   -on eliquis   -RUE doppler neg     #DM type 2   -BG actually on lower side   -hypoglycemic protocol with SSI      #HLD   -on statin     #Hypotension   -on midodrine      #Pulmonary HTN      #Chronic pain   -on oxycodone      #Depression  #Anxiety   -ativan prn    -on zoloft     #GERD   -on PPI      #B12 deficiency   -continue vitamin B12      #Hx of MRSA  -contact precautions         DVT Prophylaxis: eliquis   Diet: ADULT DIET; Regular; 4 carb choices (60 gm/meal); Low Potassium (Less than 3000 mg/day); Low Phosphorus (Less than 1000 mg);  No Caffeine  Code Status: Full Code  PT/OT Eval Status: ordered    Dispo - cont care, cardio eval in am, Agustin Carlin MD

## 2022-09-18 NOTE — PROGRESS NOTES
Patient assessed and AM medications given. PCA at beside obtaining vitals and providing hygiene. Patient had resting stress test this AM.  Denies any further needs at this time. Call light within reach.

## 2022-09-19 VITALS
HEIGHT: 64 IN | WEIGHT: 267.86 LBS | SYSTOLIC BLOOD PRESSURE: 111 MMHG | OXYGEN SATURATION: 100 % | TEMPERATURE: 97.6 F | HEART RATE: 88 BPM | RESPIRATION RATE: 18 BRPM | BODY MASS INDEX: 45.73 KG/M2 | DIASTOLIC BLOOD PRESSURE: 49 MMHG

## 2022-09-19 PROBLEM — I25.110 CORONARY ARTERY DISEASE INVOLVING NATIVE HEART WITH UNSTABLE ANGINA PECTORIS (HCC): Status: ACTIVE | Noted: 2022-09-19

## 2022-09-19 PROBLEM — N18.6 END STAGE RENAL DISEASE (HCC): Status: ACTIVE | Noted: 2022-09-19

## 2022-09-19 PROBLEM — E78.2 MIXED HYPERLIPIDEMIA: Status: ACTIVE | Noted: 2022-09-19

## 2022-09-19 LAB
ANION GAP SERPL CALCULATED.3IONS-SCNC: 17 MMOL/L (ref 3–16)
BUN BLDV-MCNC: 56 MG/DL (ref 7–20)
CALCIUM SERPL-MCNC: 9 MG/DL (ref 8.3–10.6)
CHLORIDE BLD-SCNC: 90 MMOL/L (ref 99–110)
CO2: 20 MMOL/L (ref 21–32)
CREAT SERPL-MCNC: 5.8 MG/DL (ref 0.6–1.2)
GFR AFRICAN AMERICAN: 9
GFR NON-AFRICAN AMERICAN: 7
GLUCOSE BLD-MCNC: 125 MG/DL (ref 70–99)
GLUCOSE BLD-MCNC: 134 MG/DL (ref 70–99)
GLUCOSE BLD-MCNC: 134 MG/DL (ref 70–99)
GLUCOSE BLD-MCNC: 135 MG/DL (ref 70–99)
HEPATITIS B CORE TOTAL ANTIBODY: NEGATIVE
HEPATITIS BE ANTIBODY: NEGATIVE
PERFORMED ON: ABNORMAL
POTASSIUM REFLEX MAGNESIUM: 5.3 MMOL/L (ref 3.5–5.1)
SARS-COV-2, NAAT: NOT DETECTED
SODIUM BLD-SCNC: 127 MMOL/L (ref 136–145)

## 2022-09-19 PROCEDURE — 2500000003 HC RX 250 WO HCPCS: Performed by: NURSE PRACTITIONER

## 2022-09-19 PROCEDURE — 80048 BASIC METABOLIC PNL TOTAL CA: CPT

## 2022-09-19 PROCEDURE — 6370000000 HC RX 637 (ALT 250 FOR IP): Performed by: INTERNAL MEDICINE

## 2022-09-19 PROCEDURE — 94640 AIRWAY INHALATION TREATMENT: CPT

## 2022-09-19 PROCEDURE — 2700000000 HC OXYGEN THERAPY PER DAY

## 2022-09-19 PROCEDURE — 94761 N-INVAS EAR/PLS OXIMETRY MLT: CPT

## 2022-09-19 PROCEDURE — 90935 HEMODIALYSIS ONE EVALUATION: CPT

## 2022-09-19 PROCEDURE — 6370000000 HC RX 637 (ALT 250 FOR IP): Performed by: NURSE PRACTITIONER

## 2022-09-19 PROCEDURE — 36415 COLL VENOUS BLD VENIPUNCTURE: CPT

## 2022-09-19 PROCEDURE — G0378 HOSPITAL OBSERVATION PER HR: HCPCS

## 2022-09-19 PROCEDURE — 99239 HOSP IP/OBS DSCHRG MGMT >30: CPT | Performed by: INTERNAL MEDICINE

## 2022-09-19 PROCEDURE — 99214 OFFICE O/P EST MOD 30 MIN: CPT | Performed by: INTERNAL MEDICINE

## 2022-09-19 PROCEDURE — 87635 SARS-COV-2 COVID-19 AMP PRB: CPT

## 2022-09-19 RX ORDER — ISOSORBIDE MONONITRATE 30 MG/1
30 TABLET, EXTENDED RELEASE ORAL DAILY
Status: DISCONTINUED | OUTPATIENT
Start: 2022-09-19 | End: 2022-09-19 | Stop reason: HOSPADM

## 2022-09-19 RX ORDER — PROMETHAZINE HYDROCHLORIDE 25 MG/1
12.5 TABLET ORAL EVERY 6 HOURS PRN
Status: DISCONTINUED | OUTPATIENT
Start: 2022-09-19 | End: 2022-09-19 | Stop reason: HOSPADM

## 2022-09-19 RX ORDER — NITROGLYCERIN 0.4 MG/1
0.4 TABLET SUBLINGUAL EVERY 5 MIN PRN
Status: DISCONTINUED | OUTPATIENT
Start: 2022-09-19 | End: 2022-09-19 | Stop reason: HOSPADM

## 2022-09-19 RX ORDER — OXYCODONE HYDROCHLORIDE 5 MG/1
5 CAPSULE ORAL 2 TIMES DAILY
Qty: 10 CAPSULE | Refills: 0 | Status: SHIPPED | OUTPATIENT
Start: 2022-09-19 | End: 2022-09-24

## 2022-09-19 RX ORDER — LORAZEPAM 0.5 MG/1
0.5 TABLET ORAL EVERY 12 HOURS PRN
Qty: 10 TABLET | Refills: 0 | Status: SHIPPED | OUTPATIENT
Start: 2022-09-19 | End: 2022-09-24

## 2022-09-19 RX ADMIN — ISOSORBIDE MONONITRATE 30 MG: 30 TABLET, EXTENDED RELEASE ORAL at 10:49

## 2022-09-19 RX ADMIN — PANTOPRAZOLE SODIUM 40 MG: 40 TABLET, DELAYED RELEASE ORAL at 06:04

## 2022-09-19 RX ADMIN — CALCIUM ACETATE 1334 MG: 667 CAPSULE ORAL at 08:15

## 2022-09-19 RX ADMIN — IPRATROPIUM BROMIDE AND ALBUTEROL SULFATE 3 ML: 2.5; .5 SOLUTION RESPIRATORY (INHALATION) at 11:08

## 2022-09-19 RX ADMIN — OXYCODONE 5 MG: 5 TABLET ORAL at 08:15

## 2022-09-19 RX ADMIN — MIDODRINE HYDROCHLORIDE 10 MG: 10 TABLET ORAL at 08:15

## 2022-09-19 RX ADMIN — CALCIUM ACETATE 1334 MG: 667 CAPSULE ORAL at 12:16

## 2022-09-19 RX ADMIN — ASPIRIN 81 MG: 81 TABLET, COATED ORAL at 08:16

## 2022-09-19 RX ADMIN — HYDROCORTISONE 2.5%: 25 CREAM TOPICAL at 08:16

## 2022-09-19 RX ADMIN — SERTRALINE HYDROCHLORIDE 50 MG: 50 TABLET ORAL at 08:15

## 2022-09-19 RX ADMIN — CYANOCOBALAMIN TAB 500 MCG 500 MCG: 500 TAB at 08:15

## 2022-09-19 RX ADMIN — OXYCODONE 5 MG: 5 TABLET ORAL at 16:59

## 2022-09-19 RX ADMIN — IPRATROPIUM BROMIDE AND ALBUTEROL SULFATE 3 ML: 2.5; .5 SOLUTION RESPIRATORY (INHALATION) at 07:57

## 2022-09-19 RX ADMIN — POLYETHYLENE GLYCOL (3350) 17 G: 17 POWDER, FOR SOLUTION ORAL at 17:00

## 2022-09-19 RX ADMIN — PROMETHAZINE HYDROCHLORIDE 12.5 MG: 25 TABLET ORAL at 10:49

## 2022-09-19 RX ADMIN — APIXABAN 2.5 MG: 5 TABLET, FILM COATED ORAL at 08:16

## 2022-09-19 RX ADMIN — CALCIUM ACETATE 1334 MG: 667 CAPSULE ORAL at 16:59

## 2022-09-19 RX ADMIN — CHLORHEXIDINE GLUCONATE 0.12% ORAL RINSE 15 ML: 1.2 LIQUID ORAL at 08:17

## 2022-09-19 ASSESSMENT — PAIN SCALES - GENERAL: PAINLEVEL_OUTOF10: 5

## 2022-09-19 ASSESSMENT — PAIN DESCRIPTION - DESCRIPTORS: DESCRIPTORS: BURNING;DISCOMFORT

## 2022-09-19 ASSESSMENT — PAIN DESCRIPTION - ORIENTATION: ORIENTATION: MID

## 2022-09-19 ASSESSMENT — PAIN DESCRIPTION - LOCATION: LOCATION: BUTTOCKS

## 2022-09-19 ASSESSMENT — PAIN - FUNCTIONAL ASSESSMENT: PAIN_FUNCTIONAL_ASSESSMENT: ACTIVITIES ARE NOT PREVENTED

## 2022-09-19 NOTE — PLAN OF CARE
Problem: Discharge Planning  Goal: Discharge to home or other facility with appropriate resources  9/19/2022 1924 by Lori Prescott RN  Outcome: Adequate for Discharge  9/19/2022 1151 by Lori Prescott RN  Outcome: Progressing     Problem: Pain  Goal: Verbalizes/displays adequate comfort level or baseline comfort level  9/19/2022 1924 by Lori Prescott RN  Outcome: Adequate for Discharge  9/19/2022 1151 by Lori Prescott RN  Outcome: Progressing     Problem: Safety - Adult  Goal: Free from fall injury  9/19/2022 1924 by Lori Prescott RN  Outcome: Adequate for Discharge  9/19/2022 1151 by Lori Prescott RN  Outcome: Progressing     Problem: ABCDS Injury Assessment  Goal: Absence of physical injury  9/19/2022 1924 by Lori Prescott RN  Outcome: Adequate for Discharge  9/19/2022 1151 by Lori Prescott RN  Outcome: Progressing     Problem: Skin/Tissue Integrity  Goal: Absence of new skin breakdown  Description: 1. Monitor for areas of redness and/or skin breakdown  2. Assess vascular access sites hourly  3. Every 4-6 hours minimum:  Change oxygen saturation probe site  4. Every 4-6 hours:  If on nasal continuous positive airway pressure, respiratory therapy assess nares and determine need for appliance change or resting period.   9/19/2022 1924 by Lori Prescott RN  Outcome: Adequate for Discharge  9/19/2022 1151 by Lori Prescott RN  Outcome: Progressing     Problem: Chronic Conditions and Co-morbidities  Goal: Patient's chronic conditions and co-morbidity symptoms are monitored and maintained or improved  9/19/2022 1924 by Lori Prescott RN  Outcome: Adequate for Discharge  9/19/2022 1151 by Lori Prescott RN  Outcome: Progressing

## 2022-09-19 NOTE — FLOWSHEET NOTE
09/18/22 2200   Vital Signs   Temp 97.8 °F (36.6 °C)   Temp Source Oral   Heart Rate 80   Heart Rate Source Monitor   Resp 20   BP (!) 142/65   BP Location Left lower arm   MAP (Calculated) 90.67   Pain Assessment   Pain Assessment 0-10   Oxygen Therapy   SpO2 100 %   O2 Device Trach mask   O2 Flow Rate (L/min) 6 L/min    In to assess patient for shift assessment. Patient states at home she is not on oxygen. Currently at 8 liters and 100%, reeducated patient on the reason for weaning off oxygen so she is not discharged with oxygen if it is not needed. Patient concerned about bowel movement. Small loose bowel movement noted on peripad. All skin folds cleaned, perineal area cleaned. Alert and oriented x4, repositioned in bed with assistance. Call light within reach if needed, advised  patient to call out with any needs. Patient also concerned about hemorrhoids.   No hemorrhoids noted on physical exam.

## 2022-09-19 NOTE — PROGRESS NOTES
Discharge report called to [phone] [facility] with [facility] at this time. Made staff aware that pt was scheduled to be picked up at 1800 but ambulance has not arrived.

## 2022-09-19 NOTE — PROGRESS NOTES
Called [facility]. Spoke to Yuridia with dispatch. [facility] for pickup 1900.

## 2022-09-19 NOTE — DISCHARGE SUMMARY
Hospital Medicine Discharge Summary    Patient ID: Stacy Pires      Patient's PCP: Porfirio Eagle MD    Admit Date: 9/15/2022     Discharge Date:      Admitting Provider: Julee Wood MD     Discharge Provider: Joyce Orta MD     Discharge Diagnoses: Active Hospital Problems    Diagnosis     Coronary artery disease involving native heart with unstable angina pectoris ([facility] [I25.110]      Priority: Medium    Mixed hyperlipidemia [E78.2]      Priority: Medium    End stage renal disease ([facility] [N18.6]      Priority: Medium    Chest pain [R07.9]      Priority: Medium    ESRD on dialysis ([facility] [N18.6, Z99.2]     Chronic respiratory failure requiring continuous mechanical ventilation through tracheostomy ([facility] [J96.10, Z93.0, Z99.11]     History of CVA (cerebrovascular accident) [Z86.73]     GERD (gastroesophageal reflux disease) [K21.9]     Anemia, chronic disease [D63.8]     SILVER (obstructive sleep apnea) [G47.33]     Primary hypertension [I10]        The patient was seen and examined on day of discharge and this discharge summary is in conjunction with any daily progress note from day of discharge. Hospital Course: 71 y.o. female with pmhx of CKD, HLD, pulmonary hypertension, DM type 2, atrial fibrillation, CAD, chronic respiratory failure, hx of VTE, hx of CVA, chronic pain who presented to [facility] ED with complaint of right sided chest pain that started on Wednesday and has been persistent since      #Chest pain   -trop elevated but trending down likely 2/2 to ESRD  -EKG without acute ischemic changes   -hx of CAD on ASA, statin, and BB   -NPO   -CXR neg   -chemical stress test reviewed with fixed defect - pt states she has never had MI prior  - cardiology evaluated - recommend medical management for presumed CAD.         #Right arm swelling   -needs fistulogram outpatient   -doppler neg for DVT  -elevate arm        #ESRD   -on HD Tues Thurs Sat  -continue home meds -nephrology consulted        #Chronic diastolic HF   -last echo from 2/17/22 as above   -no s/s of acute decompensation        #Chronic respiratory failure   -s/p tracheostomy more than 1 year ago   -currently on trach collar with humidified air        #Atrial fibrillation   -NSR on presentation   -rate controlled   -on eliquis for [facility]        #Chronic Anemia   -Hgb stable  -likely 2/2 to ESRD  -on B12 as well        #Hx of VTE   -on eliquis   -RUE doppler neg       #DM type 2   -BG actually on lower side   -hypoglycemic protocol with SSI        #HLD   -on statin       #Hypotension   -on midodrine        #Pulmonary HTN        #Chronic pain   -on oxycodone        #Depression  #Anxiety   -ativan prn    -on zoloft       #GERD   -on PPI        #B12 deficiency   -continue vitamin B12        #Hx of MRSA  -contact precautions              Physical Exam Performed:     BP (!) 146/67   Pulse 63   Temp 97 °F (36.1 °C) (Oral)   Resp 20   Ht 5' 4\" (1.626 m)   Wt 285 lb 3.2 oz (129.4 kg)   SpO2 100%   BMI 48.95 kg/m²       General appearance:  No apparent distress, appears stated age and cooperative. HEENT:  Normal cephalic, atraumatic without obvious deformity. Pupils equal, round, and reactive to light. Extra ocular muscles intact. Conjunctivae/corneas clear. Neck: Supple, with full range of motion. No jugular venous distention. Trachea midline. Respiratory:  Normal respiratory effort. Clear to auscultation, bilaterally without Rales/Wheezes/Rhonchi. Cardiovascular:  Regular rate and rhythm with normal S1/S2 without murmurs, rubs or gallops. Abdomen: Soft, non-tender, non-distended with normal bowel sounds. Musculoskeletal:  No clubbing, cyanosis or edema bilaterally. Full range of motion without deformity. Skin: Skin color, texture, turgor normal.  No rashes or lesions. Neurologic:  Neurovascularly intact without any focal sensory/motor deficits.  Cranial nerves: II-XII intact, grossly non-focal.  Psychiatric: Alert and oriented, thought content appropriate, normal insight  Capillary Refill: Brisk,< 3 seconds   Peripheral Pulses: +2 palpable, equal bilaterally       Labs: For convenience and continuity at follow-up the following most recent labs are provided:      CBC:    Lab Results   Component Value Date/Time    WBC 6.9 09/18/2022 04:14 AM    HGB 9.7 09/18/2022 04:14 AM    HCT 31.2 09/18/2022 04:14 AM     09/18/2022 04:14 AM       Renal:    Lab Results   Component Value Date/Time     09/19/2022 05:08 AM    K 5.3 09/19/2022 05:08 AM    CL 90 09/19/2022 05:08 AM    CO2 20 09/19/2022 05:08 AM    BUN 56 09/19/2022 05:08 AM    CREATININE 5.8 09/19/2022 05:08 AM    CALCIUM 9.0 09/19/2022 05:08 AM    PHOS 5.7 09/17/2022 04:51 AM         Significant Diagnostic Studies    Radiology:   NM Cardiac Stress Test Nuclear Imaging   Final Result      VL Extremity Venous Right   Final Result      XR CHEST PORTABLE   Final Result   Cardiomegaly and low lung volumes. No acute cardiopulmonary process. Consults:     IP CONSULT TO HOSPITALIST  IP CONSULT TO NEPHROLOGY  IP CONSULT TO CARDIOLOGY    Disposition:  ECF    Condition at Discharge: Stable    Discharge Instructions/Follow-up:  PCP 1 week. Code Status:  Full Code     Activity: activity as tolerated    Diet: regular diet      Discharge Medications:     Current Discharge Medication List             Details   oxyCODONE 5 MG capsule Take 1 capsule by mouth in the morning and 1 capsule in the evening. Do all this for 5 days. Qty: 10 capsule, Refills: 0    Comments: Reduce doses taken as pain becomes manageable  Associated Diagnoses: End stage renal disease (HCC)      LORazepam (ATIVAN) 0.5 MG tablet Take 1 tablet by mouth every 12 hours as needed for Anxiety for up to 5 days.   Qty: 10 tablet, Refills: 0    Associated Diagnoses: End stage renal disease ([facility]                Details   apixaban (ELIQUIS) 2.5 MG TABS tablet Take 2.5 mg by mouth 2 times daily (PRILOSEC) 20 MG delayed release capsule Take 20 mg by mouth daily      chlorhexidine (PERIDEX) 0.12 % solution Take 15 mLs by mouth 2 times daily      sennosides-docusate sodium (SENOKOT-S) 8.6-50 MG tablet Take 2 tablets by mouth at bedtime       sertraline (ZOLOFT) 50 MG tablet Take 50 mg by mouth daily      terazosin (HYTRIN) 1 MG capsule Take 1 mg by mouth nightly      ondansetron (ZOFRAN) 4 MG tablet Take 4 mg by mouth every 8 hours as needed for Nausea or Vomiting       !! - Potential duplicate medications found. Please discuss with provider. Time Spent on discharge is more than 30 minutes in the examination, evaluation, counseling and review of medications and discharge plan. Signed:    Arya Slade MD   9/19/2022      Thank you Pedro Araiza MD for the opportunity to be involved in this patient's care. If you have any questions or concerns, please feel free to contact me at [phone].

## 2022-09-19 NOTE — PROGRESS NOTES
Treatment time: 3 hours  Net UF: 1167 ml     Pre weight: 122.5 kg  Post weight:121.5 kg  EDW: 120 kg    Access used: RIAVF    Access function: WELL with  ml/min     Medications or blood products given: n/a     Regular outpatient schedule: MWF     Summary of response to treatment: Patient tolerated treatment WELL and without any complications. Patient remained stable throughout entire treatment.         09/19/22 1452 09/19/22 1616   Treatment   Time On 1452  --    Time Off  --  1616   Treatment Goal 3000  --    Vital Signs   BP (!) 152/68 (!) 111/49   Temp 97.6 °F (36.4 °C) 97.6 °F (36.4 °C)   Heart Rate 67 88   Resp 16 18   Weight 270 lb 1 oz (122.5 kg) 267 lb 13.7 oz (121.5 kg)   Dialysis Bath   K+ (Potassium) 2  --    Ca+ (Calcium) 2.5  --    Na+ (Sodium) 138  --    HCO3 (Bicarb) 35  --

## 2022-09-19 NOTE — PROGRESS NOTES
RT Inhaler-Nebulizer Bronchodilator Protocol Note    There is a bronchodilator order in the chart from a provider indicating to follow the RT Bronchodilator Protocol and there is an Initiate RT Inhaler-Nebulizer Bronchodilator Protocol order as well (see protocol at bottom of note). CXR Findings:  No results found. The findings from the last RT Protocol Assessment were as follows:   History Pulmonary Disease: (P) Chronic pulmonary disease  Respiratory Pattern: (P) Regular pattern and RR 12-20 bpm  Breath Sounds: (P) Slightly diminished and/or crackles  Cough: (P) Strong, spontaneous, non-productive  Indication for Bronchodilator Therapy: (P) Decreased or absent breath sounds  Bronchodilator Assessment Score: (P) 4    Aerosolized bronchodilator medication orders have been revised according to the RT Inhaler-Nebulizer Bronchodilator Protocol below. Respiratory Therapist to perform RT Therapy Protocol Assessment initially then follow the protocol. Repeat RT Therapy Protocol Assessment PRN for score 0-3 or on second treatment, BID, and PRN for scores above 3. No Indications - adjust the frequency to every 6 hours PRN wheezing or bronchospasm, if no treatments needed after 48 hours then discontinue using Per Protocol order mode. If indication present, adjust the RT bronchodilator orders based on the Bronchodilator Assessment Score as indicated below. Use Inhaler orders unless patient has one or more of the following: on home nebulizer, not able to hold breath for 10 seconds, is not alert and oriented, cannot activate and use MDI correctly, or respiratory rate 25 breaths per minute or more, then use the equivalent nebulizer order(s) with same Frequency and PRN reasons based on the score. If a patient is on this medication at home then do not decrease Frequency below that used at home.     0-3 - enter or revise RT bronchodilator order(s) to equivalent RT Bronchodilator order with Frequency of every 4 hours PRN for wheezing or increased work of breathing using Per Protocol order mode. 4-6 - enter or revise RT Bronchodilator order(s) to two equivalent RT bronchodilator orders with one order with BID Frequency and one order with Frequency of every 4 hours PRN wheezing or increased work of breathing using Per Protocol order mode. 7-10 - enter or revise RT Bronchodilator order(s) to two equivalent RT bronchodilator orders with one order with TID Frequency and one order with Frequency of every 4 hours PRN wheezing or increased work of breathing using Per Protocol order mode. 11-13 - enter or revise RT Bronchodilator order(s) to one equivalent RT bronchodilator order with QID Frequency and an Albuterol order with Frequency of every 4 hours PRN wheezing or increased work of breathing using Per Protocol order mode. Greater than 13 - enter or revise RT Bronchodilator order(s) to one equivalent RT bronchodilator order with every 4 hours Frequency and an Albuterol order with Frequency of every 2 hours PRN wheezing or increased work of breathing using Per Protocol order mode.        Electronically signed by Berna Byers RCP on 9/19/2022 at 8:06 AM

## 2022-09-19 NOTE — PROGRESS NOTES
Repositioned several times throughout the night. Also attempted weaning patient off of oxygen. Patient states she is not on oxygen at home, 02 sat 100% on 8 liters/  Currently on no oxygen and 02 sat is 99%.   Call light within reach if needed

## 2022-09-19 NOTE — DISCHARGE INSTR - COC
pain R07.9    Coronary artery disease involving native heart with unstable angina pectoris ([facility]) I25.110    Mixed hyperlipidemia E78.2    End stage renal disease (HCC) N18.6       Isolation/Infection:   Isolation            Contact          Patient Infection Status       Infection Onset Added Last Indicated Last Indicated By Review Planned Expiration Resolved Resolved By    MDRO (multi-drug resistant organism) 22 Culture, Respiratory        MRSA 21 Julee Aparicio RN        Added from external infection. Resolved    COVID-19 (Rule Out) 09/15/22 09/15/22 09/15/22 COVID-19, Rapid (Ordered)   09/15/22 Rule-Out Test Resulted    COVID-19 (Rule Out) 22 COVID-19 & Influenza Combo (Ordered)   22 Pino Alonso RN    COVID-19 (Rule Out) 22 COVID-19, Rapid (Ordered)   22 Rule-Out Test Resulted    C-diff Rule Out 22 Clostridium difficile toxin/antigen (Ordered)   22 Julee Aparicio RN    Order .     COVID-19 (Rule Out) 22 COVID-19 & Influenza Combo (Ordered)   22 Rule-Out Test Resulted    COVID-19 (Rule Out) 01/10/22 01/10/22 01/11/22 COVID-19 & Influenza Combo (Ordered)   22 Rule-Out Test Resulted            Nurse Assessment:  Last Vital Signs: /62   Pulse 65   Temp 97.3 °F (36.3 °C) (Oral)   Resp 18   Ht 5' 4\" (1.626 m)   Wt 285 lb 3.2 oz (129.4 kg)   SpO2 100%   BMI 48.95 kg/m²     Last documented pain score (0-10 scale): Pain Level: 7  Last Weight:   Wt Readings from Last 1 Encounters:   22 285 lb 3.2 oz (129.4 kg)     Mental Status:  oriented, alert, coherent, logical, thought processes intact, and able to concentrate and follow conversation    IV Access:  - None    Nursing Mobility/ADLs:  Walking   Dependent  Transfer  [address] Assisted  Med Delivery   whole    Wound Care Documentation and Therapy:  Negative Pressure Wound Therapy Abdomen Medial (Active)   Number of days: 131       Wound 04/26/22 Thigh Proximal;Right;Posterior (Active)   Number of days: 145       Wound 05/08/22 Thigh Anterior;Right;Medial wound upper inner rigth thigh (Active)   Number of days: 133       Incision 05/10/22 Abdomen Medial (Active)   Number of days: 131        Elimination:  Continence: Bowel: No  Bladder: No  Urinary Catheter: None   Colostomy/Ileostomy/Ileal Conduit: No       Date of Last BM: 9/19/22  No intake or output data in the 24 hours ending 09/19/22 1156  I/O last 3 completed shifts: In: 180 [P.O.:180]  Out: 0     Safety Concerns: At Risk for Falls and Aspiration Risk    Impairments/Disabilities:      None    Nutrition Therapy:  Current Nutrition Therapy:   - Oral Diet:  General, Carb Control 4 carbs/meal (1800kcals/day), and Low potassium, Low Phosphorus    Routes of Feeding: Oral  Liquids: Thin Liquids  Daily Fluid Restriction: no  Last Modified Barium Swallow with Video (Video Swallowing Test): not done    Treatments at the Time of Hospital Discharge:   Respiratory Treatments:   Oxygen Therapy:  is on oxygen at 3 L/min per nasal cannula.   Ventilator:    - No ventilator support    Rehab Therapies:   Weight Bearing Status/Restrictions: No weight bearing restrictions  Other Medical Equipment (for information only, NOT a DME order):  maribel lift  Other Treatments:     Patient's personal belongings (please select all that are sent with patient):  Cell phone    RN SIGNATURE:  Electronically signed by Angie Correa RN on 9/19/22 at 5:20 PM EDT    CASE MANAGEMENT/SOCIAL WORK SECTION    Inpatient Status Date: OBS    Readmission Risk Assessment Score:  Readmission Risk              Risk of Unplanned Readmission:  0           Discharging to Facility/ Agency     Name: [facility])  Address: [address] , [place], [address] [place] [address], [address]  Phone:[phone]  Fax: [phone]     Dialysis Facility (if applicable)   Name:  Address:  Dialysis Schedule:  Phone:  Fax:    / signature: Electronically signed by Becky Peterson RN on 9/19/22 at 11:57 AM EDT    PHYSICIAN SECTION    Prognosis: Fair    Condition at Discharge: Stable    Rehab Potential (if transferring to Rehab): Fair    Recommended Labs or Other Treatments After Discharge:     Resume long term care. Trach care. Wound care. Physician Certification: I certify the above information and transfer of Lu Jones  is necessary for the continuing treatment of the diagnosis listed and that she requires [facility] for greater 30 days.      Update Admission H&P: No change in H&P    PHYSICIAN SIGNATURE:  Electronically signed by Sylvia Nathan MD on 9/19/22 at 2:45 PM EDT

## 2022-09-19 NOTE — PROGRESS NOTES
Patient refusing IV. Iv in left AC infiltrated and removed.   Patient states she is a very hard stick and refusing IV unless it is an emergency

## 2022-09-19 NOTE — PLAN OF CARE
Problem: Discharge Planning  Goal: Discharge to home or other facility with appropriate resources  9/19/2022 1151 by Roya Cho RN  Outcome: Progressing  9/19/2022 0421 by Mercedes Clarke RN  Outcome: Progressing     Problem: Pain  Goal: Verbalizes/displays adequate comfort level or baseline comfort level  9/19/2022 1151 by Roya Cho RN  Outcome: Progressing  9/19/2022 0421 by Mercedes Clarke RN  Outcome: Progressing     Problem: Safety - Adult  Goal: Free from fall injury  9/19/2022 1151 by Roya Cho RN  Outcome: Progressing  9/19/2022 0421 by Mercedes Clarke RN  Outcome: Progressing     Problem: ABCDS Injury Assessment  Goal: Absence of physical injury  Outcome: Progressing     Problem: Skin/Tissue Integrity  Goal: Absence of new skin breakdown  Description: 1. Monitor for areas of redness and/or skin breakdown  2. Assess vascular access sites hourly  3. Every 4-6 hours minimum:  Change oxygen saturation probe site  4. Every 4-6 hours:  If on nasal continuous positive airway pressure, respiratory therapy assess nares and determine need for appliance change or resting period.   Outcome: Progressing     Problem: Chronic Conditions and Co-morbidities  Goal: Patient's chronic conditions and co-morbidity symptoms are monitored and maintained or improved  Outcome: Progressing

## 2022-09-19 NOTE — PROGRESS NOTES
Telemetry monitor #12 removed and returned to [address] [place] [address]. Pt left facility in stable condition to [facility] with all of their personal belongings. Pt leaving facility at this time in stable condition, being transported by [facility].

## 2022-09-19 NOTE — CARE COORDINATION
DISCHARGE ORDER  Date/Time 2022 11:58 AM  Completed by: Antionette Dorsey RN, Case Management    Patient Name: Josseline Valencia    : 1950      Admit order Date and Status: OBS  Noted discharge order. (verify MD's last order for status of admission/Traditional Medicare 3 [place] Inpatient qualifying stay required for SNF)    Confirmed discharge plan with:              Patient:  Yes                  Discharge to Facility:     Name: [facility])  Address[address] [place] [address], [place] [address]  Fax: [phone]      Facility phone number for staff giving report: see above   Pre-certification completed: [id number] Notification (HENS) completed: NA   Discharge orders and Continuity of Care faxed to facility:  YES      Transportation:               Medical Transport explained with choice list offered to pt/family. Choice: (no preference)  Agency used: [facility]   time:         Pt/family/Nursing/Facility aware of  time:   patient    [place] (dtr)  Quintin Lagunas RN    Ambulance form completed: YES     Date Last IMM Given: NA- OBS- Given MOON letter on admission    Comments:NA    Pt is being d/c'd to LTC at [facility] today. Pt's O2 sats are 100% on trach mask 6 liters. Discharge timeout done with Quintin Lagunas RN. All discharge needs and concerns addressed. Discharging nurse to complete AMADEO, reconcile AVS, and place final copy with patient's discharge packet. Discharging RN to ensure that written prescriptions for  Level II medications are sent with patient to the facility as per protocol.

## 2022-09-19 NOTE — CONSULTS
[address] [place] [address]  [phone]        Reason for Consultation/Chief Complaint: \"I have been asked to see this patient for abnormal stress test, fixed defects without h/o MI.\"    History of Present Illness:  Juan Wolfe is a 71 y.o. patient who presented to the hospital with complaints of  chest pain tightness . Chest pain is resolved. H/o ESRD on HD removed 4 L. EMR reviewed as follows  HLD, pulmonary hypertension, DM type 2, atrial fibrillation, CAD, chronic respiratory failure resp dependent, hx of VTE left brachial vein , hx of CVA, chronic pain who presented to [facility] ED with complaint of right sided chest pain that started on 9.14.22 and has been persistent since. Resolved on 9.15.22. Past Medical History:   has a past medical history of Acute and chronic respiratory failure (acute-on-chronic) ([facility]), Acute blood loss anemia, Acute deep vein thrombosis (DVT) of brachial vein of left upper extremity ([address] Anxiety disorder, Atherosclerotic heart disease of native coronary artery without angina pectoris, Bleeding hemorrhoids, Cerebellar infarct ([address] Cerebral infarction ([address] Chronic pain syndrome, Dependence on renal dialysis ([address] Dependence on respirator ([address] End stage renal disease ([address] Gastro-esophageal reflux disease with esophagitis, without bleeding, Insomnia, Major depressive disorder, recurrent ([address] Morbid obesity due to excess calories ([address] Muscle weakness, Obstructive sleep apnea (adult) (pediatric), Other hyperlipidemia, Other voice and resonance disorders, Personal history of COVID-19, Pulmonary hypertension ([address] Tracheostomy status ([address] Type 2 diabetes mellitus without complications ([address] and Unspecified atrial fibrillation ([address] Surgical History:   has a past surgical history that includes colectomy (N/A, 5/10/2022). Social History:   reports that she has quit smoking.  She has never used smokeless tobacco. She reports that she does not drink alcohol and does not use drugs. Family History:  family history is not on file. Home Medications:  Were reviewed and are listed in nursing record. and/or listed below  Prior to Admission medications    Medication Sig Start Date End Date Taking? Authorizing Provider   apixaban (ELIQUIS) 2.5 MG TABS tablet Take 2.5 mg by mouth 2 times daily   Yes Historical Provider, MD   LORazepam (ATIVAN) 0.5 MG tablet Take 0.5 mg by mouth 3 times daily. Yes Historical Provider, MD   LORazepam (ATIVAN) 0.5 MG tablet Take 0.5 mg by mouth every 12 hours as needed for Anxiety. Yes Historical Provider, MD   loperamide (IMODIUM) 2 MG capsule Take 2 mg by mouth 4 times daily as needed for Diarrhea   Yes Historical Provider, MD   oxyCODONE 5 MG capsule Take 5 mg by mouth in the morning and 5 mg in the evening.    Yes Historical Provider, MD   promethazine (PHENERGAN) 12.5 MG tablet Take 12.5 mg by mouth every 6 hours as needed for Nausea   Yes Historical Provider, MD   traZODone (DESYREL) 50 MG tablet Take 50 mg by mouth nightly   Yes Historical Provider, MD   vitamin B-12 (CYANOCOBALAMIN) 500 MCG tablet Take 500 mcg by mouth daily   Yes Historical Provider, MD   metoprolol tartrate (LOPRESSOR) 25 MG tablet Take 1 tablet by mouth 2 times daily 5/21/22   Poppy Jones MD   insulin glargine (LANTUS) 100 UNIT/ML injection vial Inject 15 Units into the skin 2 times daily  Patient not taking: Reported on 9/15/2022 5/21/22   Poppy Jones MD   warfarin (COUMADIN) 3 MG tablet Take 1 tablet by mouth nightly  Patient not taking: Reported on 9/15/2022 4/30/22   Vince Easton MD   hydrocortisone (ANUSOL-HC) 2.5 % CREA rectal cream Place rectally 2 times daily 3/3/22   Gi Crocker DO   aspirin 81 MG EC tablet Take 81 mg by mouth daily  Patient not taking: Reported on 9/15/2022    Historical Provider, MD   cyanocobalamin 1000 MCG tablet Take 500 mcg by mouth daily  Patient not taking: Reported on 9/15/2022 Normal gross motor and sensory exam.         Labs  CBC:   Lab Results   Component Value Date/Time    WBC 6.9 09/18/2022 04:14 AM    RBC 3.25 09/18/2022 04:14 AM    HGB 9.7 09/18/2022 04:14 AM    HCT 31.2 09/18/2022 04:14 AM    MCV 95.9 09/18/2022 04:14 AM    RDW 17.4 09/18/2022 04:14 AM     09/18/2022 04:14 AM     CMP:    Lab Results   Component Value Date/Time     09/19/2022 05:08 AM    K 5.3 09/19/2022 05:08 AM    CL 90 09/19/2022 05:08 AM    CO2 20 09/19/2022 05:08 AM    BUN 56 09/19/2022 05:08 AM    CREATININE 5.8 09/19/2022 05:08 AM    GFRAA 9 09/19/2022 05:08 AM    AGRATIO 0.9 09/16/2022 04:17 AM    LABGLOM 7 09/19/2022 05:08 AM    GLUCOSE 134 09/19/2022 05:08 AM    PROT 7.0 09/16/2022 04:17 AM    CALCIUM 9.0 09/19/2022 05:08 AM    BILITOT 0.6 09/16/2022 04:17 AM    ALKPHOS 100 09/16/2022 04:17 AM    AST 8 09/16/2022 04:17 AM    ALT 8 09/16/2022 04:17 AM     PT/INR:  No results found for: PTINR  Lab Results   Component Value Date    TROPONINI 0.23 (H) 09/16/2022     Nuclear stress test 9.18.22   Resting ECG    There is a small-moderate size fixed inferior defect suggestive of scar. There is no ischemia. Overall LV function is normal with EF=72%    Possible inferior basal hypokinesis    Intermediate risk     Troponin 0.21 0.24 0.21  EKG:  I have reviewed EKG with the following interpretation:  Impression:   EKG:  Sinus rhythm with 1st degree A-V blockCannot rule out Anterior infarct , age undeterminedNonspecific ST abnormalityAbnormal ECGWhen compared with ECG of 09-MAY-2022 05:33,Sinus rhythm has replaced Atrial fibrillationConfirmed by Cheryl Neal ([id number]) on 9/15/2022 2:07:07 PM      RADIOLOGY  XR CHEST PORTABLE   Final Result 9.15.22   Cardiomegaly and low lung volumes. No acute cardiopulmonary process. Pertinent previous results reviewed   Echocardiogram from 2/18/22   Summary   Definity contrast administered.    Left ventricular systolic function is mildly reduced with visually estimated   EF of 45%. Endocardium not entirely well visualized but no obvious segmental wall   motion abnormalities. Diastolic dysfunction grade and filling pressure are indeterminate. Mild concentric left ventricular hypertrophy. Unable to obtain SPAP due to lack of tricuspid regurgitation. Valves are not well visualized but there are no obvious structural   abnormalities or color flow abnormalities seen on doppler. 9- [facility]. 60%  Assessment  Chest pain resolved  she has flat troponin pattern no ischemia on EKG         Stress test shows fixed inf wall defect. 2.   ESRD  HD  3. Chronic resp failure  4. Hypertension  5. Morbid obesity  6 Edema rt arm due to dependent edema sits leaning to right rt arm below heart level. Plan:    I had the opportunity to review the clinical symptoms and presentation of Noreen Muñoz. I recommend that the patient undergo medical treatment only for possible CAD  Should start statin beta blockers and aspirin [id number] daily  Hemodialysis  Keep rt arm elevated and sit straight  OK to discharge from cardiology perspective    I will address the patient's cardiac risk factors and adjusted pharmacologic treatment as needed. In addition, I have reinforced the need for patient directed risk factor modification. Thank you for allowing to us to participate in the care or Noreen Muñoz. Further evaluation will be based upon the patient's clinical course and testing results. All questions and concerns were addressed to the patient/family. Alternatives to my treatment were discussed. The note was completed using EMR. Every effort was made to ensure accuracy; however, inadvertent computerized transcription errors may be present.

## 2022-09-19 NOTE — FLOWSHEET NOTE
09/19/22 1416   Vital Signs   Temp 97 °F (36.1 °C)   Temp Source Oral   Heart Rate 63   Heart Rate Source Monitor   Resp 20   BP (!) 146/67   BP Location Left lower arm   BP Method Automatic   MAP (Calculated) 93.33   Patient Position High fowlers   Level of Consciousness 0   MEWS Score 1   Oxygen Therapy   SpO2 100 %   O2 Device Trach mask   O2 Flow Rate (L/min) 6 L/min       Oxygen decreased to 3L at this time.

## 2022-09-19 NOTE — DISCHARGE INSTR - DIET
Good nutrition is important when healing from an illness, injury, or surgery. Follow any nutrition recommendations given to you during your hospital stay. If you were given an oral nutrition supplement while in the hospital, continue to take this supplement at home. You can take it with meals, in-between meals, and/or before bedtime. These supplements can be purchased at most local grocery stores, pharmacies, and chain [employer]-stores. If you have any questions about your diet or nutrition, call the hospital and ask for the dietitian.     Regular Diet.       -Low Carb       -Low Potassium       -Low Phosporus     NO Caffeine

## 2022-09-19 NOTE — PROGRESS NOTES
[facility] Progress Note           Subjective/   71y.o. year old female who we are seeing in consultation for ESKD on HD. HPI:  Patient complains of pain in the hemorrhoid  HD on 9/17 with 4.5 liters removed, post-weight of 122.5 kg; Hypotensive with HD, received prn albumin. ROS:  States still with some short of breath. +weak.     Scheduled Meds:   insulin lispro  0-4 Units SubCUTAneous TID WC    insulin lispro  0-4 Units SubCUTAneous Nightly    epoetin claire-epbx  8,000 Units IntraVENous Once per day on Tue Thu Sat    apixaban  2.5 mg Oral BID    atorvastatin  80 mg Oral Nightly    calcium acetate  1,334 mg Oral TID WC    aspirin  81 mg Oral Daily    chlorhexidine  15 mL Mouth/Throat BID    hydrocortisone   Rectal BID    metoprolol tartrate  25 mg Oral BID    midodrine  10 mg Oral Once per day on Mon Wed Fri    pantoprazole  40 mg Oral QAM AC    oxyCODONE  5 mg Oral BID    sertraline  50 mg Oral Daily    traZODone  50 mg Oral Nightly    vitamin B-12  500 mcg Oral Daily    sodium chloride flush  5-40 mL IntraVENous 2 times per day    ipratropium-albuterol  1 vial Inhalation 4x daily     Continuous Infusions:   dextrose      sodium chloride       PRN Meds:.glucose, dextrose bolus **OR** dextrose bolus, glucagon (rDNA), dextrose, LORazepam, sodium chloride flush, sodium chloride, ondansetron **OR** ondansetron, acetaminophen **OR** acetaminophen, polyethylene glycol    Objective/   GEN:  Chronically ill, /62   Pulse 65   Temp 97.3 °F (36.3 °C) (Oral)   Resp 20   Ht 5' 4\" (1.626 m)   Wt 285 lb 3.2 oz (129.4 kg)   SpO2 100%   BMI 48.95 kg/m²   HEENT: non-icteric, no JVD  CV: S1, S2 without m/r/g; + LE edema  RESP: CTA B without w/r/r; breathing wnl  ABD: +bs, soft, nt, no hsm  SKIN: warm, no rashes  ACCESS: [facility] AV    Data/  Recent Labs     09/17/22  0451 09/18/22  0414   WBC 6.1 6.9   HGB 10.0* 9.7*   HCT 31.2* 31.2*   MCV 94.3 95.9    175       Recent Labs 09/17/22  0451 09/18/22  0415 09/19/22  0508   * 131* 127*   K 5.3*  5.3* 4.7 5.3*   CL 95* 93* 90*   CO2 27 25 20*   GLUCOSE 91 119* 134*   PHOS 5.7*  --   --    BUN 64* 43* 56*   CREATININE 6.1* 5.1* 5.8*   LABGLOM 7* 8* 7*   GFRAA 8* 10* 9*         Assessment/     - End stage kidney disease - on HD Tues-Thurs-Sat     - NSTEMI - stress testing with no ischemia, possible inferior basal hypokinesis, intermediate risk     - Anemia of chronic disease - KOKO with HD     - Diastolic CHF     - Hypotension - midodrine with HD       Plan/     - HD per TTS schedule - agreeable to run again on Monday for further UF as tolerated, crit line monitoring, prn albumin,  kg  -Defer hemorrhoid treatment to primary team  - KOKO with HD per OP orders  - Trend labs, bp's    ____________________________________  Lakesha Abrams MD  [facility]  [email or web address]  Office: [phone]

## 2022-09-20 LAB — HEPATITIS BE ANTIGEN: NEGATIVE

## 2022-10-25 ENCOUNTER — APPOINTMENT (OUTPATIENT)
Dept: GENERAL RADIOLOGY | Age: 72
End: 2022-10-25
Payer: COMMERCIAL

## 2022-10-25 ENCOUNTER — HOSPITAL ENCOUNTER (EMERGENCY)
Age: 72
Discharge: ANOTHER HOSPITAL | End: 2022-10-26
Attending: STUDENT IN AN ORGANIZED HEALTH CARE EDUCATION/TRAINING PROGRAM
Payer: COMMERCIAL

## 2022-10-25 DIAGNOSIS — R07.9 CHEST PAIN, UNSPECIFIED TYPE: Primary | ICD-10-CM

## 2022-10-25 DIAGNOSIS — N18.6 ESRD (END STAGE RENAL DISEASE) (HCC): ICD-10-CM

## 2022-10-25 LAB
A/G RATIO: 0.7 (ref 1.1–2.2)
ALBUMIN SERPL-MCNC: 3.3 G/DL (ref 3.4–5)
ALP BLD-CCNC: 114 U/L (ref 40–129)
ALT SERPL-CCNC: 8 U/L (ref 10–40)
ANION GAP SERPL CALCULATED.3IONS-SCNC: 9 MMOL/L (ref 3–16)
AST SERPL-CCNC: 18 U/L (ref 15–37)
BASE EXCESS VENOUS: 3.8 MMOL/L (ref -3–3)
BASOPHILS ABSOLUTE: 0 K/UL (ref 0–0.2)
BASOPHILS RELATIVE PERCENT: 0.7 %
BILIRUB SERPL-MCNC: 0.4 MG/DL (ref 0–1)
BUN BLDV-MCNC: 39 MG/DL (ref 7–20)
CALCIUM SERPL-MCNC: 9 MG/DL (ref 8.3–10.6)
CARBOXYHEMOGLOBIN: 2.4 % (ref 0–1.5)
CHLORIDE BLD-SCNC: 96 MMOL/L (ref 99–110)
CO2: 31 MMOL/L (ref 21–32)
CREAT SERPL-MCNC: 4.7 MG/DL (ref 0.6–1.2)
EKG ATRIAL RATE: 79 BPM
EKG DIAGNOSIS: NORMAL
EKG P AXIS: 58 DEGREES
EKG P-R INTERVAL: 272 MS
EKG Q-T INTERVAL: 388 MS
EKG QRS DURATION: 80 MS
EKG QTC CALCULATION (BAZETT): 444 MS
EKG R AXIS: -3 DEGREES
EKG T AXIS: 26 DEGREES
EKG VENTRICULAR RATE: 79 BPM
EOSINOPHILS ABSOLUTE: 0.3 K/UL (ref 0–0.6)
EOSINOPHILS RELATIVE PERCENT: 5.6 %
GFR SERPL CREATININE-BSD FRML MDRD: 9 ML/MIN/{1.73_M2}
GLUCOSE BLD-MCNC: 87 MG/DL (ref 70–99)
HCO3 VENOUS: 29.4 MMOL/L (ref 23–29)
HCT VFR BLD CALC: 34.8 % (ref 36–48)
HEMOGLOBIN: 10.8 G/DL (ref 12–16)
LYMPHOCYTES ABSOLUTE: 0.9 K/UL (ref 1–5.1)
LYMPHOCYTES RELATIVE PERCENT: 15.6 %
MAGNESIUM: 1.5 MG/DL (ref 1.8–2.4)
MCH RBC QN AUTO: 29.9 PG (ref 26–34)
MCHC RBC AUTO-ENTMCNC: 31.1 G/DL (ref 31–36)
MCV RBC AUTO: 96.2 FL (ref 80–100)
METHEMOGLOBIN VENOUS: 0.3 %
MONOCYTES ABSOLUTE: 0.5 K/UL (ref 0–1.3)
MONOCYTES RELATIVE PERCENT: 9.6 %
NEUTROPHILS ABSOLUTE: 3.9 K/UL (ref 1.7–7.7)
NEUTROPHILS RELATIVE PERCENT: 68.5 %
O2 CONTENT, VEN: 12 VOL %
O2 SAT, VEN: 74 %
O2 THERAPY: ABNORMAL
PCO2, VEN: 48.4 MMHG (ref 40–50)
PDW BLD-RTO: 16.7 % (ref 12.4–15.4)
PH VENOUS: 7.4 (ref 7.35–7.45)
PHOSPHORUS: 2.8 MG/DL (ref 2.5–4.9)
PLATELET # BLD: 171 K/UL (ref 135–450)
PMV BLD AUTO: 8 FL (ref 5–10.5)
PO2, VEN: 39.8 MMHG (ref 25–40)
POTASSIUM REFLEX MAGNESIUM: 5 MMOL/L (ref 3.5–5.1)
PRO-BNP: 8079 PG/ML (ref 0–124)
RBC # BLD: 3.62 M/UL (ref 4–5.2)
SODIUM BLD-SCNC: 136 MMOL/L (ref 136–145)
TCO2 CALC VENOUS: 31 MMOL/L
TOTAL PROTEIN: 8.3 G/DL (ref 6.4–8.2)
TROPONIN: 0.18 NG/ML
TROPONIN: 0.23 NG/ML
WBC # BLD: 5.7 K/UL (ref 4–11)

## 2022-10-25 PROCEDURE — 96374 THER/PROPH/DIAG INJ IV PUSH: CPT

## 2022-10-25 PROCEDURE — 6360000002 HC RX W HCPCS: Performed by: STUDENT IN AN ORGANIZED HEALTH CARE EDUCATION/TRAINING PROGRAM

## 2022-10-25 PROCEDURE — 36415 COLL VENOUS BLD VENIPUNCTURE: CPT

## 2022-10-25 PROCEDURE — 93005 ELECTROCARDIOGRAM TRACING: CPT | Performed by: EMERGENCY MEDICINE

## 2022-10-25 PROCEDURE — 83880 ASSAY OF NATRIURETIC PEPTIDE: CPT

## 2022-10-25 PROCEDURE — 99285 EMERGENCY DEPT VISIT HI MDM: CPT

## 2022-10-25 PROCEDURE — 83735 ASSAY OF MAGNESIUM: CPT

## 2022-10-25 PROCEDURE — 93010 ELECTROCARDIOGRAM REPORT: CPT | Performed by: INTERNAL MEDICINE

## 2022-10-25 PROCEDURE — 84484 ASSAY OF TROPONIN QUANT: CPT

## 2022-10-25 PROCEDURE — 82803 BLOOD GASES ANY COMBINATION: CPT

## 2022-10-25 PROCEDURE — 80053 COMPREHEN METABOLIC PANEL: CPT

## 2022-10-25 PROCEDURE — 85025 COMPLETE CBC W/AUTO DIFF WBC: CPT

## 2022-10-25 PROCEDURE — 84100 ASSAY OF PHOSPHORUS: CPT

## 2022-10-25 PROCEDURE — 71045 X-RAY EXAM CHEST 1 VIEW: CPT

## 2022-10-25 RX ORDER — FENTANYL CITRATE 50 UG/ML
50 INJECTION, SOLUTION INTRAMUSCULAR; INTRAVENOUS ONCE
Status: COMPLETED | OUTPATIENT
Start: 2022-10-25 | End: 2022-10-25

## 2022-10-25 RX ADMIN — FENTANYL CITRATE 50 MCG: 50 INJECTION, SOLUTION INTRAMUSCULAR; INTRAVENOUS at 22:56

## 2022-10-25 ASSESSMENT — PAIN - FUNCTIONAL ASSESSMENT
PAIN_FUNCTIONAL_ASSESSMENT: NONE - DENIES PAIN
PAIN_FUNCTIONAL_ASSESSMENT: 0-10

## 2022-10-25 ASSESSMENT — PAIN DESCRIPTION - LOCATION
LOCATION: BUTTOCKS
LOCATION: BUTTOCKS

## 2022-10-25 ASSESSMENT — PAIN DESCRIPTION - DESCRIPTORS
DESCRIPTORS: ACHING
DESCRIPTORS: ACHING

## 2022-10-25 ASSESSMENT — PAIN DESCRIPTION - ORIENTATION
ORIENTATION: MID
ORIENTATION: LOWER;MID

## 2022-10-25 ASSESSMENT — PAIN SCALES - GENERAL
PAINLEVEL_OUTOF10: 8
PAINLEVEL_OUTOF10: 10

## 2022-10-25 NOTE — ED PROVIDER NOTES
Emergency Department Encounter    Patient: Jc Guevara  MRN: [id number]  : 1950  Date of Evaluation: 10/25/2022  ED Provider:  Bernard De Anda MD    Triage Chief Complaint:   Chest Pain    [place]:  Jc Guevara is a 71 y.o. female with history seen below presenting with complaints of chest discomfort. Patient states prior to presentation around noon she began having intermittent chest discomfort that lasted about 2 hours. States the symptoms of current resolved. Denies any shortness of breath, cough, sputum production, fevers or chills. Denies lower extremity edema, orthopnea or signs of fluid overload. States she is on Eliquis and has been taking as prescribed. Patient is on dialysis  and states she did receive dialysis today. She she denies any abdominal pain, nausea vomiting, diarrhea or constipation. Denies headache, blurred vision, focal deficits, motor or sensory changes. Denies neck or back pain. States currently she feels at baseline.     ROS - see HPI, below listed is current ROS at time of my eval:  At least 14 systems reviewed, negative other HPI    Past Medical History:   Diagnosis Date    Acute and chronic respiratory failure (acute-on-chronic) (HCC)     Acute blood loss anemia 2020    Acute deep vein thrombosis (DVT) of brachial vein of left upper extremity ([address] 2019    Formatting of this note might be different from the original. Dx 2019    Anxiety disorder     Atherosclerotic heart disease of native coronary artery without angina pectoris     Bleeding hemorrhoids 2020    Cerebellar infarct ([address] 2019    Cerebral infarction (HCC)     Chronic pain syndrome     Dependence on renal dialysis ([address]     Dependence on respirator ([address]     End stage renal disease ([address]     Gastro-esophageal reflux disease with esophagitis, without bleeding     Insomnia     Major depressive disorder, recurrent ([address]     Morbid obesity due to excess calories (HCC)     Muscle weakness     Obstructive sleep apnea (adult) (pediatric)     Other hyperlipidemia     Other voice and resonance disorders     Personal history of COVID-19     Pulmonary hypertension ([facility]     Tracheostomy status ([facility]     Type 2 diabetes mellitus without complications ([facility]     Unspecified atrial fibrillation [facility])      Past Surgical History:   Procedure Laterality Date    COLECTOMY N/A 5/10/2022    EXPLORATORY LAPAROTOMY, LYSIS OF ADHESIONS performed by Edna Jimenez MD at [facility] [address]     No family history on file. Social History     Socioeconomic History    Marital status:      Spouse name: Not on file    Number of children: Not on file    Years of education: Not on file    Highest education level: Not on file   Occupational History    Not on file   Tobacco Use    Smoking status: Former    Smokeless tobacco: Never   Substance and Sexual Activity    Alcohol use: Never    Drug use: Never    Sexual activity: Not Currently   Other Topics Concern    Not on file   Social History Narrative    Not on file     Social Determinants of Health     Financial Resource Strain: Not on file   Food Insecurity: Not on file   Transportation Needs: Not on file   Physical Activity: Not on file   Stress: Not on file   Social Connections: Not on file   Intimate Partner Violence: Not on file   Housing Stability: Not on file     No current facility-administered medications for this encounter.      Current Outpatient Medications   Medication Sig Dispense Refill    apixaban (ELIQUIS) 2.5 MG TABS tablet Take 2.5 mg by mouth 2 times daily      loperamide (IMODIUM) 2 MG capsule Take 2 mg by mouth 4 times daily as needed for Diarrhea      promethazine (PHENERGAN) 12.5 MG tablet Take 12.5 mg by mouth every 6 hours as needed for Nausea      traZODone (DESYREL) 50 MG tablet Take 50 mg by mouth nightly      vitamin B-12 (CYANOCOBALAMIN) 500 MCG tablet Take 500 mcg by mouth daily      metoprolol tartrate (LOPRESSOR) 25 MG tablet Take 1 tablet by mouth 2 times daily 60 tablet 3    insulin glargine (LANTUS) 100 UNIT/ML injection vial Inject 15 Units into the skin 2 times daily (Patient not taking: Reported on 9/15/2022) 10 mL 3    hydrocortisone (ANUSOL-HC) 2.5 % CREA rectal cream Place rectally 2 times daily 1 each 0    aspirin 81 MG EC tablet Take 81 mg by mouth daily (Patient not taking: Reported on 9/15/2022)      cyanocobalamin 1000 MCG tablet Take 500 mcg by mouth daily (Patient not taking: Reported on 9/15/2022)      QUEtiapine (SEROQUEL) 25 MG tablet Take 12.5 mg by mouth nightly (Patient not taking: Reported on 9/15/2022)      sodium polystyrene (KAYEXALATE) 15 GM/60ML suspension Take 15 g by mouth 4 times daily Sun, Mon, Wed, Fri (Patient not taking: Reported on 9/15/2022)      calcium acetate 667 MG TABS Take 1,334 mg by mouth 3 times daily (with meals)      hydrocortisone 1 % cream Apply topically 2 times daily Apply topically 2 times daily.       acetaminophen (TYLENOL) 325 MG tablet Take 650 mg by mouth every 6 hours as needed for Pain      atorvastatin (LIPITOR) 80 MG tablet Take 80 mg by mouth at bedtime      diphenhydrAMINE (BENADRYL) 25 MG capsule Take 25 mg by mouth every 8 hours as needed       docusate sodium (COLACE) 100 MG capsule Take 100 mg by mouth 2 times daily (Patient not taking: Reported on 9/15/2022)      lactulose encephalopathy 10 GM/15ML SOLN solution Take 20 g by mouth daily as needed  (Patient not taking: Reported on 9/15/2022)      polyethylene glycol (GLYCOLAX) 17 g packet Take 17 g by mouth daily as needed for Constipation      ipratropium-albuterol (DUONEB) 0.5-2.5 (3) MG/3ML SOLN nebulizer solution Inhale 1 vial into the lungs every 4 hours      midodrine (PROAMATINE) 5 MG tablet Take 10 mg by mouth three times a week Tues thurs sat pre dialysis      omeprazole (PRILOSEC) 20 MG delayed release capsule Take 20 mg by mouth daily      chlorhexidine (PERIDEX) 0.12 % solution Take 15 mLs by mouth 2 times daily      sennosides-docusate sodium (SENOKOT-S) 8.6-50 MG tablet Take 2 tablets by mouth at bedtime  (Patient not taking: Reported on 9/15/2022)      sertraline (ZOLOFT) 50 MG tablet Take 50 mg by mouth daily      terazosin (HYTRIN) 1 MG capsule Take 1 mg by mouth nightly (Patient not taking: Reported on 9/15/2022)      ondansetron (ZOFRAN) 4 MG tablet Take 4 mg by mouth every 8 hours as needed for Nausea or Vomiting (Patient not taking: Reported on 9/15/2022)       Allergies   Allergen Reactions    Haloperidol Lactate Other (See Comments)     PT DOESN'T REMEMBER      Ziprasidone Hcl Other (See Comments)     PT DOESN'T REMEMBER         Nursing Notes Reviewed    Physical Exam:  Triage VS:    ED Triage Vitals [10/25/22 1450]   Enc Vitals Group      BP (!) 127/59      Heart Rate 79      Resp 18      Temp       Temp src       SpO2 94 %      Weight 287 lb (130.2 kg)      Height       Head Circumference       Peak Flow       Pain Score       Pain Loc       Pain Edu? Excl. in [address]? My pulse ox interpretation is - normal    General appearance:  No acute distress. Obese  Skin:  Warm. Dry. Eye:  Extraocular movements intact. Ears, nose, mouth and throat:  Oral mucosa moist   Neck:  Trachea midline. Trach in place without surrounding erythema, fluctuance, purulent  Extremity:  No swelling. Normal ROM     Heart:  Regular rate and rhythm, normal S1 & S2, no extra heart sounds. Perfusion:  intact  Respiratory:  Lungs clear to auscultation bilaterally. Respirations nonlabored. Abdominal:  Normal bowel sounds. Soft. Nontender. Non distended. Back:  No CVA tenderness to palpation     Neurological:  Alert and oriented times 3. No focal neuro deficits.              Psychiatric:  Appropriate    I have reviewed and interpreted all of the currently available lab results from this visit (if applicable):  Results for orders placed or performed during the hospital encounter of 10/25/22 CBC with Auto Differential   Result Value Ref Range    WBC 5.7 4.0 - 11.0 K/uL    RBC 3.62 (L) 4.00 - 5.20 M/uL    Hemoglobin 10.8 (L) 12.0 - 16.0 g/dL    Hematocrit 34.8 (L) 36.0 - 48.0 %    MCV 96.2 80.0 - 100.0 fL    MCH 29.9 26.0 - 34.0 pg    MCHC 31.1 31.0 - 36.0 g/dL    RDW 16.7 (H) 12.4 - 15.4 %    Platelets 797 344 - 358 K/uL    MPV 8.0 5.0 - 10.5 fL    Neutrophils % 68.5 %    Lymphocytes % 15.6 %    Monocytes % 9.6 %    Eosinophils % 5.6 %    Basophils % 0.7 %    Neutrophils Absolute 3.9 1.7 - 7.7 K/uL    Lymphocytes Absolute 0.9 (L) 1.0 - 5.1 K/uL    Monocytes Absolute 0.5 0.0 - 1.3 K/uL    Eosinophils Absolute 0.3 0.0 - 0.6 K/uL    Basophils Absolute 0.0 0.0 - 0.2 K/uL      Radiographs (if obtained):  Radiologist's Report Reviewed:  No results found. EKG (if obtained): (All EKG's are interpreted by myself in the absence of a cardiologist)  Sinus rhythm with first-degree AV block, ventricular rate 79, OH interval 272, QRS duration 80, QTc 444, T wave version V1 and V2, no significant ST elevation or depression    MDM:    67-year-old female presenting with history seen above. Vitals on presentation are reassuring and patient afebrile satting well on room air. Physical exam is seen above. EKG can be seen above. Patient does have new T wave version in V2. Westly Levans is elevated 0.23 but this is similar to prior exam.  CBC is reassuring without leukocytosis. Hemoglobin is within normal limits. CMP is consistent with end-stage renal disease. Potassium is within normal limits. VBG reveals a normal pH with normal PCO2. I discussed with cardiology who states with patient's recent stress test patient would benefit from transfer to a facility that can perform cath. Discussed with hospitalist at [facility] and patient will be transferred to their facility for further evaluation and treatment. Clinical Impression:  1. Chest pain, unspecified type    2.  ESRD (end stage renal disease) ([facility]          Comment: Please note this report has been produced using speech recognition software and may contain errors related to that system including errors in grammar, punctuation, and spelling, as well as words and phrases that may be inappropriate. Efforts were made to edit the dictations.         Shar Sanchez MD  10/25/22 2241       Shar Sanchez MD  10/25/22 2245

## 2022-10-25 NOTE — ED NOTES
Serene at [facility] updated [phone] on plan of care.  Asked that she fax over a medication list. She stated she will      Lyly Owen RN  10/25/22 6094

## 2022-10-25 NOTE — ED NOTES
Pt refusing to have flu/covid swab, states \" oh no you ain't shovin that all the way up my nose! You just go ahead and send me right on back then cause I ain't doin it! OSMIN Suero RN  10/25/22 0278

## 2022-10-25 NOTE — ED NOTES
1828-Call placed to [facility] for Transfer to facility with Cath. Lab.    1847-Call back received from Dr. Karolyn David spoke to Dr. Enrique Carter.    Homer Muse  10/25/22 1836       Homer Muse  10/25/22 1851

## 2022-10-25 NOTE — ED NOTES
1740-Call placed to Cardiology for consult. 1747-Call back received from Dr. Leonard Dennis Cardiology spoke with Dr. Tamara Mims.    Mimi Rasheed  10/25/22 1080 [facility]  10/25/22 1815

## 2022-10-26 ENCOUNTER — HOSPITAL ENCOUNTER (OUTPATIENT)
Age: 72
Setting detail: OBSERVATION
Discharge: NURSING, REHAB OR LONG-TERM CARE | End: 2022-10-28
Attending: INTERNAL MEDICINE | Admitting: INTERNAL MEDICINE
Payer: COMMERCIAL

## 2022-10-26 VITALS
BODY MASS INDEX: 49.26 KG/M2 | RESPIRATION RATE: 10 BRPM | OXYGEN SATURATION: 98 % | HEART RATE: 67 BPM | DIASTOLIC BLOOD PRESSURE: 54 MMHG | SYSTOLIC BLOOD PRESSURE: 102 MMHG | WEIGHT: 287 LBS | TEMPERATURE: 98.5 F

## 2022-10-26 LAB
EKG ATRIAL RATE: 75 BPM
EKG DIAGNOSIS: NORMAL
EKG P AXIS: 73 DEGREES
EKG P-R INTERVAL: 262 MS
EKG Q-T INTERVAL: 404 MS
EKG QRS DURATION: 78 MS
EKG QTC CALCULATION (BAZETT): 451 MS
EKG R AXIS: 0 DEGREES
EKG T AXIS: 28 DEGREES
EKG VENTRICULAR RATE: 75 BPM
GLUCOSE BLD-MCNC: 101 MG/DL (ref 70–99)
GLUCOSE BLD-MCNC: 78 MG/DL (ref 70–99)
GLUCOSE BLD-MCNC: 89 MG/DL (ref 70–99)
PERFORMED ON: ABNORMAL
PERFORMED ON: NORMAL
PERFORMED ON: NORMAL
RAPID INFLUENZA  B AGN: NEGATIVE
RAPID INFLUENZA A AGN: NEGATIVE
SARS-COV-2, NAAT: NOT DETECTED
TROPONIN: 0.21 NG/ML
TROPONIN: 0.22 NG/ML

## 2022-10-26 PROCEDURE — 84484 ASSAY OF TROPONIN QUANT: CPT

## 2022-10-26 PROCEDURE — 2700000000 HC OXYGEN THERAPY PER DAY

## 2022-10-26 PROCEDURE — 93005 ELECTROCARDIOGRAM TRACING: CPT | Performed by: INTERNAL MEDICINE

## 2022-10-26 PROCEDURE — 36415 COLL VENOUS BLD VENIPUNCTURE: CPT

## 2022-10-26 PROCEDURE — G0378 HOSPITAL OBSERVATION PER HR: HCPCS

## 2022-10-26 PROCEDURE — 6370000000 HC RX 637 (ALT 250 FOR IP): Performed by: NURSE PRACTITIONER

## 2022-10-26 PROCEDURE — 87804 INFLUENZA ASSAY W/OPTIC: CPT

## 2022-10-26 PROCEDURE — 6370000000 HC RX 637 (ALT 250 FOR IP): Performed by: INTERNAL MEDICINE

## 2022-10-26 PROCEDURE — 94640 AIRWAY INHALATION TREATMENT: CPT

## 2022-10-26 PROCEDURE — 2580000003 HC RX 258: Performed by: INTERNAL MEDICINE

## 2022-10-26 PROCEDURE — 87635 SARS-COV-2 COVID-19 AMP PRB: CPT

## 2022-10-26 PROCEDURE — 99215 OFFICE O/P EST HI 40 MIN: CPT | Performed by: INTERNAL MEDICINE

## 2022-10-26 PROCEDURE — G0379 DIRECT REFER HOSPITAL OBSERV: HCPCS

## 2022-10-26 PROCEDURE — 94761 N-INVAS EAR/PLS OXIMETRY MLT: CPT

## 2022-10-26 PROCEDURE — 6360000002 HC RX W HCPCS: Performed by: NURSE PRACTITIONER

## 2022-10-26 RX ORDER — ALBUTEROL SULFATE 2.5 MG/3ML
2.5 SOLUTION RESPIRATORY (INHALATION) EVERY 6 HOURS PRN
Status: DISCONTINUED | OUTPATIENT
Start: 2022-10-26 | End: 2022-10-26

## 2022-10-26 RX ORDER — DIPHENHYDRAMINE HCL 25 MG
25 TABLET ORAL EVERY 6 HOURS PRN
Status: DISCONTINUED | OUTPATIENT
Start: 2022-10-26 | End: 2022-10-28 | Stop reason: HOSPADM

## 2022-10-26 RX ORDER — ACETAMINOPHEN 325 MG/1
650 TABLET ORAL EVERY 6 HOURS PRN
Status: DISCONTINUED | OUTPATIENT
Start: 2022-10-26 | End: 2022-10-28 | Stop reason: HOSPADM

## 2022-10-26 RX ORDER — CHOLECALCIFEROL (VITAMIN D3) 125 MCG
500 CAPSULE ORAL DAILY
Status: DISCONTINUED | OUTPATIENT
Start: 2022-10-26 | End: 2022-10-28 | Stop reason: HOSPADM

## 2022-10-26 RX ORDER — PANTOPRAZOLE SODIUM 40 MG/1
40 TABLET, DELAYED RELEASE ORAL
Status: DISCONTINUED | OUTPATIENT
Start: 2022-10-27 | End: 2022-10-28 | Stop reason: HOSPADM

## 2022-10-26 RX ORDER — TRAZODONE HYDROCHLORIDE 50 MG/1
50 TABLET ORAL NIGHTLY
Status: DISCONTINUED | OUTPATIENT
Start: 2022-10-26 | End: 2022-10-28 | Stop reason: HOSPADM

## 2022-10-26 RX ORDER — MIDODRINE HYDROCHLORIDE 5 MG/1
10 TABLET ORAL
Status: DISCONTINUED | OUTPATIENT
Start: 2022-10-26 | End: 2022-10-27

## 2022-10-26 RX ORDER — ACETAMINOPHEN 650 MG/1
650 SUPPOSITORY RECTAL EVERY 6 HOURS PRN
Status: DISCONTINUED | OUTPATIENT
Start: 2022-10-26 | End: 2022-10-28 | Stop reason: HOSPADM

## 2022-10-26 RX ORDER — SODIUM CHLORIDE 9 MG/ML
INJECTION, SOLUTION INTRAVENOUS PRN
Status: DISCONTINUED | OUTPATIENT
Start: 2022-10-26 | End: 2022-10-28 | Stop reason: HOSPADM

## 2022-10-26 RX ORDER — POLYETHYLENE GLYCOL 3350 17 G/17G
17 POWDER, FOR SOLUTION ORAL DAILY PRN
Status: DISCONTINUED | OUTPATIENT
Start: 2022-10-26 | End: 2022-10-28 | Stop reason: HOSPADM

## 2022-10-26 RX ORDER — ASPIRIN 81 MG/1
81 TABLET, CHEWABLE ORAL DAILY
Status: DISCONTINUED | OUTPATIENT
Start: 2022-10-27 | End: 2022-10-28 | Stop reason: HOSPADM

## 2022-10-26 RX ORDER — HYDROCORTISONE ACETATE 25 MG/1
25 SUPPOSITORY RECTAL 2 TIMES DAILY
Status: DISCONTINUED | OUTPATIENT
Start: 2022-10-26 | End: 2022-10-26

## 2022-10-26 RX ORDER — SODIUM CHLORIDE 0.9 % (FLUSH) 0.9 %
5-40 SYRINGE (ML) INJECTION PRN
Status: DISCONTINUED | OUTPATIENT
Start: 2022-10-26 | End: 2022-10-28 | Stop reason: HOSPADM

## 2022-10-26 RX ORDER — ATORVASTATIN CALCIUM 40 MG/1
40 TABLET, FILM COATED ORAL NIGHTLY
Status: DISCONTINUED | OUTPATIENT
Start: 2022-10-26 | End: 2022-10-28 | Stop reason: HOSPADM

## 2022-10-26 RX ORDER — INSULIN LISPRO 100 [IU]/ML
0-4 INJECTION, SOLUTION INTRAVENOUS; SUBCUTANEOUS
Status: DISCONTINUED | OUTPATIENT
Start: 2022-10-26 | End: 2022-10-28 | Stop reason: HOSPADM

## 2022-10-26 RX ORDER — SODIUM CHLORIDE 0.9 % (FLUSH) 0.9 %
5-40 SYRINGE (ML) INJECTION EVERY 12 HOURS SCHEDULED
Status: DISCONTINUED | OUTPATIENT
Start: 2022-10-26 | End: 2022-10-28 | Stop reason: HOSPADM

## 2022-10-26 RX ORDER — DIAPER,BRIEF,INFANT-TODD,DISP
EACH MISCELLANEOUS 2 TIMES DAILY
Status: DISCONTINUED | OUTPATIENT
Start: 2022-10-26 | End: 2022-10-28 | Stop reason: HOSPADM

## 2022-10-26 RX ORDER — PROMETHAZINE HYDROCHLORIDE 25 MG/1
12.5 TABLET ORAL EVERY 6 HOURS PRN
Status: DISCONTINUED | OUTPATIENT
Start: 2022-10-26 | End: 2022-10-28 | Stop reason: HOSPADM

## 2022-10-26 RX ORDER — ALBUTEROL SULFATE 2.5 MG/3ML
2.5 SOLUTION RESPIRATORY (INHALATION)
Status: DISCONTINUED | OUTPATIENT
Start: 2022-10-27 | End: 2022-10-28 | Stop reason: HOSPADM

## 2022-10-26 RX ORDER — DEXTROSE MONOHYDRATE 100 MG/ML
INJECTION, SOLUTION INTRAVENOUS CONTINUOUS PRN
Status: DISCONTINUED | OUTPATIENT
Start: 2022-10-26 | End: 2022-10-28 | Stop reason: HOSPADM

## 2022-10-26 RX ORDER — ONDANSETRON 2 MG/ML
4 INJECTION INTRAMUSCULAR; INTRAVENOUS EVERY 6 HOURS PRN
Status: DISCONTINUED | OUTPATIENT
Start: 2022-10-26 | End: 2022-10-28 | Stop reason: HOSPADM

## 2022-10-26 RX ORDER — INSULIN LISPRO 100 [IU]/ML
0-4 INJECTION, SOLUTION INTRAVENOUS; SUBCUTANEOUS NIGHTLY
Status: DISCONTINUED | OUTPATIENT
Start: 2022-10-26 | End: 2022-10-28 | Stop reason: HOSPADM

## 2022-10-26 RX ADMIN — TRAZODONE HYDROCHLORIDE 50 MG: 50 TABLET ORAL at 21:52

## 2022-10-26 RX ADMIN — APIXABAN 2.5 MG: 2.5 TABLET, FILM COATED ORAL at 13:22

## 2022-10-26 RX ADMIN — ALBUTEROL SULFATE 2.5 MG: 2.5 SOLUTION RESPIRATORY (INHALATION) at 21:35

## 2022-10-26 RX ADMIN — ATORVASTATIN CALCIUM 40 MG: 40 TABLET, FILM COATED ORAL at 21:52

## 2022-10-26 RX ADMIN — APIXABAN 2.5 MG: 2.5 TABLET, FILM COATED ORAL at 21:52

## 2022-10-26 RX ADMIN — METOPROLOL TARTRATE 25 MG: 25 TABLET, FILM COATED ORAL at 13:22

## 2022-10-26 RX ADMIN — METOPROLOL TARTRATE 25 MG: 25 TABLET, FILM COATED ORAL at 21:51

## 2022-10-26 RX ADMIN — CYANOCOBALAMIN TAB 500 MCG 500 MCG: 500 TAB at 13:23

## 2022-10-26 RX ADMIN — SODIUM CHLORIDE, PRESERVATIVE FREE 10 ML: 5 INJECTION INTRAVENOUS at 21:54

## 2022-10-26 RX ADMIN — SERTRALINE HYDROCHLORIDE 50 MG: 50 TABLET ORAL at 13:23

## 2022-10-26 RX ADMIN — HYDROCORTISONE ACETATE: 1 CREAM TOPICAL at 21:53

## 2022-10-26 RX ADMIN — DIPHENHYDRAMINE HCL 25 MG: 25 TABLET ORAL at 23:04

## 2022-10-26 ASSESSMENT — LIFESTYLE VARIABLES
HOW MANY STANDARD DRINKS CONTAINING ALCOHOL DO YOU HAVE ON A TYPICAL DAY: PATIENT DOES NOT DRINK
HOW OFTEN DO YOU HAVE A DRINK CONTAINING ALCOHOL: NEVER

## 2022-10-26 ASSESSMENT — PAIN SCALES - GENERAL
PAINLEVEL_OUTOF10: 0
PAINLEVEL_OUTOF10: 4
PAINLEVEL_OUTOF10: 0
PAINLEVEL_OUTOF10: 0

## 2022-10-26 ASSESSMENT — PAIN DESCRIPTION - LOCATION: LOCATION: BUTTOCKS

## 2022-10-26 ASSESSMENT — PAIN DESCRIPTION - PAIN TYPE: TYPE: ACUTE PAIN

## 2022-10-26 ASSESSMENT — PAIN DESCRIPTION - DESCRIPTORS
DESCRIPTORS: ACHING
DESCRIPTORS: ACHING

## 2022-10-26 ASSESSMENT — PAIN DESCRIPTION - ORIENTATION: ORIENTATION: LOWER;MID

## 2022-10-26 ASSESSMENT — PAIN - FUNCTIONAL ASSESSMENT: PAIN_FUNCTIONAL_ASSESSMENT: NONE - DENIES PAIN

## 2022-10-26 NOTE — CARE COORDINATION
CASE MANAGEMENT INITIAL ASSESSMENT      Reviewed chart and completed assessment with patient  Family present: None  Explained Case Management role/services. Yes    Primary contact information:Pt's daughter [address] :   Primary Decision Maker: [address] [place] [address] [phone]          Admit date/status:10/26/22 OBS   Diagnosis:Chest pain    Is this a Readmission?:  No      Insurance:[employer] required for SNF: Yes       3 night stay required: No    Living arrangements, Adls, care needs, prior to admission:Pt is LTC at [place] [address] at home:  Walker__Cane__RTS__ BSC__Shower Chair__  02__ HHN__ CPAP__  BiPap__  Hospital Bed__ W/C___ Other_____    Services in the home and/or outpatient, prior to admission:LTC [facility]               Transportation needs: Will need ambulance back to facility [facility]      Dialysis Facility (if applicable)   Name:Christ[facility] E[facility]   Address: [address] [place] [address] [address] [place], [address]   Dialysis Schedule: T,TH,SAT 0830 am   Phone: [phone]  Fax:    PT/OT recs:Not ordered     Hospital Exemption Notification (HEN):NA return     Barriers to discharge:None     Plan/comments:10/26/22 Pt is LTC at [facility] no barriers to return, pt will only need a rapid Covid prior to dc. Pt has a trach that is currently capped on RA, pt is also an HD pt T,TH,SAT Luzma Crook.  Here for chest pain     ECOC on chart for MD signature

## 2022-10-26 NOTE — PROGRESS NOTES
Patient in room, aerosol set up if needed. Patient on RA wearing passy manolo valve, cuff down on trach. Shiley Distal XLT 6.0. Suction supplies in room.

## 2022-10-26 NOTE — PROGRESS NOTES
4 Eyes Skin Assessment     The patient is being assess for   Admission    I agree that 2 RN's have performed a thorough Head to Toe Skin Assessment on the patient. ALL assessment sites listed below have been assessed. Areas assessed for pressure by both nurses:   [x]   Head, Face, and Ears   [x]   Shoulders, Back, and Chest, Abdomen  [x]   Arms, Elbows, and Hands   [x]   Coccyx, Sacrum, and Ischium  [x]   Legs, Feet, and Heels        Skin Assessed Under all Medical Devices by both nurses:  trache               All Mepilex Borders were peeled back and area peeked at by both nurses:  Yes  Please list where Mepilex Borders are located:  n/a             **SHARE this note so that the co-signing nurse is able to place an eSignature**    Co-signer eSignature: {Esignature:[id number]}    Does the Patient have Skin Breakdown related to pressure?   Yes LDA WOUND CARE was Initiated documentation include the Viviana-wound, Wound Assessment, Measurements, Dressing Treatment, Drainage, and Color\",     (Insert Photo here***)         Stu Prevention initiated:  Yes   Wound Care Orders initiated:  Yes      [address]  nurse consulted for Pressure Injury (Stage 3,4, Unstageable, DTI, NWPT, Complex wounds)and New or Established Ostomies:  Yes      Primary Nurse eSignature: Electronically signed by Yane Crocker RN on 10/26/22 at 7:26 PM EDT

## 2022-10-26 NOTE — PLAN OF CARE
Problem: Chronic Conditions and Co-morbidities  Goal: Patient's chronic conditions and co-morbidity symptoms are monitored and maintained or improved  Outcome: Progressing     Problem: Discharge Planning  Goal: Discharge to home or other facility with appropriate resources  Outcome: Progressing     Problem: Safety - Adult  Goal: Free from fall injury  Outcome: Progressing     Problem: ABCDS Injury Assessment  Goal: Absence of physical injury  Outcome: Progressing     Problem: Skin/Tissue Integrity  Goal: Absence of new skin breakdown  Outcome: Progressing

## 2022-10-26 NOTE — ED NOTES
Transport at bedside report given. Report called to [facility]. Pt to go to room 447.       Liam Forman RN  10/26/22 3525

## 2022-10-26 NOTE — CONSULTS
[facility]  [phone]      Attending Physician: Yara Lima MD  Reason for Consultation/Chief Complaint: chest pain      Subjective   History of Present Illness:  Filipe Houston is a 71 y.o. patient who presented to the hospital with complaints of chest pain, she states she has been having this on and off for a few months although she also states she doesn't recall her symptoms well. Currently , denies cp/sob. She says she does not get out of bed much, exercises her legs. She has chronic respiratory failure, she has a trach, she is in a LTAC. She has had end-stage renal disease, dialyzed on Tuesdays Thursdays and Saturdays. She also had been referred at some point this year for palliative care given chronic medical conditions. Chronically also she does have diabetes, history of DVT, history of atrial fibrillation has been on Coumadin but now on eliquis. Pt is being assessed for covid/flu.      Past Medical History:   has a past medical history of Acute and chronic respiratory failure (acute-on-chronic) ([facility]), Acute blood loss anemia, Acute deep vein thrombosis (DVT) of brachial vein of left upper extremity ([address] Anxiety disorder, Atherosclerotic heart disease of native coronary artery without angina pectoris, Bleeding hemorrhoids, Cerebellar infarct ([address] Cerebral infarction ([address] Chronic pain syndrome, Dependence on renal dialysis ([address] Dependence on respirator ([address] End stage renal disease ([address] Gastro-esophageal reflux disease with esophagitis, without bleeding, Insomnia, Major depressive disorder, recurrent ([address] Morbid obesity due to excess calories ([address] Muscle weakness, Obstructive sleep apnea (adult) (pediatric), Other hyperlipidemia, Other voice and resonance disorders, Personal history of COVID-19, Pulmonary hypertension ([address] Tracheostomy status ([address] Type 2 diabetes mellitus without complications ([address] and Unspecified atrial fibrillation [facility]).    Surgical History:   has a past surgical history that includes colectomy (N/A, 5/10/2022). Social History:   reports that she has quit smoking. She has never used smokeless tobacco. She reports that she does not drink alcohol and does not use drugs. Home Medications:  Were reviewed and are listed in nursing record and/or below  Prior to Admission medications    Medication Sig Start Date End Date Taking?  Authorizing Provider   apixaban (ELIQUIS) 2.5 MG TABS tablet Take 2.5 mg by mouth 2 times daily    Historical Provider, MD   loperamide (IMODIUM) 2 MG capsule Take 2 mg by mouth 4 times daily as needed for Diarrhea    Historical Provider, MD   promethazine (PHENERGAN) 12.5 MG tablet Take 12.5 mg by mouth every 6 hours as needed for Nausea    Historical Provider, MD   traZODone (DESYREL) 50 MG tablet Take 50 mg by mouth nightly    Historical Provider, MD   vitamin B-12 (CYANOCOBALAMIN) 500 MCG tablet Take 500 mcg by mouth daily    Historical Provider, MD   metoprolol tartrate (LOPRESSOR) 25 MG tablet Take 1 tablet by mouth 2 times daily 5/21/22   Mayra Chopra MD   insulin glargine (LANTUS) 100 UNIT/ML injection vial Inject 15 Units into the skin 2 times daily  Patient not taking: Reported on 9/15/2022 5/21/22   Mayra Chopra MD   warfarin (COUMADIN) 3 MG tablet Take 1 tablet by mouth nightly  Patient not taking: Reported on 9/15/2022 4/30/22 9/19/22  Henri Jiang MD   hydrocortisone (ANUSOL-HC) 2.5 % CREA rectal cream Place rectally 2 times daily 3/3/22   Daniella Lewis DO   aspirin 81 MG EC tablet Take 81 mg by mouth daily  Patient not taking: Reported on 9/15/2022    Historical Provider, MD   cyanocobalamin 1000 MCG tablet Take 500 mcg by mouth daily  Patient not taking: Reported on 9/15/2022    Historical Provider, MD   QUEtiapine (SEROQUEL) 25 MG tablet Take 12.5 mg by mouth nightly  Patient not taking: Reported on 9/15/2022    Historical Provider, MD   sodium polystyrene (KAYEXALATE) 15 GM/60ML suspension Take 15 g by mouth 4 times daily Sun, Mon, Wed, Fri  Patient not taking: Reported on 9/15/2022    Historical Provider, MD   calcium acetate 667 MG TABS Take 1,334 mg by mouth 3 times daily (with meals)    Historical Provider, MD   hydrocortisone 1 % cream Apply topically 2 times daily Apply topically 2 times daily.     Historical Provider, MD   acetaminophen (TYLENOL) 325 MG tablet Take 650 mg by mouth every 6 hours as needed for Pain    Historical Provider, MD   atorvastatin (LIPITOR) 80 MG tablet Take 80 mg by mouth at bedtime    Historical Provider, MD   diphenhydrAMINE (BENADRYL) 25 MG capsule Take 25 mg by mouth every 8 hours as needed     Historical Provider, MD   docusate sodium (COLACE) 100 MG capsule Take 100 mg by mouth 2 times daily  Patient not taking: Reported on 9/15/2022    Historical Provider, MD   lactulose encephalopathy 10 GM/15ML SOLN solution Take 20 g by mouth daily as needed   Patient not taking: Reported on 9/15/2022    Historical Provider, MD   polyethylene glycol (GLYCOLAX) 17 g packet Take 17 g by mouth daily as needed for Constipation    Historical Provider, MD   ipratropium-albuterol (DUONEB) 0.5-2.5 (3) MG/3ML SOLN nebulizer solution Inhale 1 vial into the lungs every 4 hours    Historical Provider, MD   midodrine (PROAMATINE) 5 MG tablet Take 10 mg by mouth three times a week Tues thurs sat pre dialysis    Historical Provider, MD   omeprazole (PRILOSEC) 20 MG delayed release capsule Take 20 mg by mouth daily    Historical Provider, MD   chlorhexidine (PERIDEX) 0.12 % solution Take 15 mLs by mouth 2 times daily    Historical Provider, MD   sennosides-docusate sodium (SENOKOT-S) 8.6-50 MG tablet Take 2 tablets by mouth at bedtime   Patient not taking: Reported on 9/15/2022    Historical Provider, MD   sertraline (ZOLOFT) 50 MG tablet Take 50 mg by mouth daily    Historical Provider, MD   terazosin (HYTRIN) 1 MG capsule Take 1 mg by mouth nightly  Patient not taking: Reported on 9/15/2022    Historical Provider, MD   ondansetron (ZOFRAN) 4 MG tablet Take 4 mg by mouth every 8 hours as needed for Nausea or Vomiting  Patient not taking: Reported on 9/15/2022    Historical Provider, MD   furosemide (LASIX) 20 MG tablet Take 20 mg by mouth three times a week [facility]  Patient not taking: Reported on 9/15/2022  9/19/22  Historical Provider, MD        CURRENT Medications:  sodium chloride flush 0.9 % injection 5-40 mL, 2 times per day  sodium chloride flush 0.9 % injection 5-40 mL, PRN  0.9 % sodium chloride infusion, PRN  ondansetron (ZOFRAN) injection 4 mg, Q6H PRN  acetaminophen (TYLENOL) tablet 650 mg, Q6H PRN   Or  acetaminophen (TYLENOL) suppository 650 mg, Q6H PRN  [START ON 10/27/2022] aspirin chewable tablet 81 mg, Daily  atorvastatin (LIPITOR) tablet 40 mg, Nightly  perflutren lipid microspheres (DEFINITY) injection 1.65 mg, ONCE PRN  hydrocortisone (ANUSOL-HC) suppository 25 mg, BID  apixaban (ELIQUIS) tablet 2.5 mg, BID  metoprolol tartrate (LOPRESSOR) tablet 25 mg, BID  midodrine (PROAMATINE) tablet 10 mg, Once per day on Mon Wed Fri  [START ON 10/27/2022] pantoprazole (PROTONIX) tablet 40 mg, QAM AC  polyethylene glycol (GLYCOLAX) packet 17 g, Daily PRN  promethazine (PHENERGAN) tablet 12.5 mg, Q6H PRN  sertraline (ZOLOFT) tablet 50 mg, Daily  traZODone (DESYREL) tablet 50 mg, Nightly  vitamin B-12 (CYANOCOBALAMIN) tablet 500 mcg, Daily        Allergies:  Haloperidol lactate and Ziprasidone hcl     Review of Systems:   A 14 point review of symptoms completed. Pertinent positives identified in the HPI, all other review of symptoms negative as below.       Objective   PHYSICAL EXAM:    Vitals:    10/26/22 1108   BP:    Pulse:    Resp:    Temp:    SpO2: 97%    Weight: 286 lb 9.6 oz (130 kg)         General Appearance:  Alert, cooperative, no distress, appears stated age, lying flat    Head:  Normocephalic, without obvious abnormality, atraumatic   Neck: Supple, diff to assess jvp , has trach   Lungs:   Clear to auscultation bilaterally, respirations unlabored   Chest Wall:  Healed scars    Heart:  Regular rate and rhythm, S1, S2 normal, no murmur, rub or gallop   Abdomen:   Soft, non-tender, bowel sounds active all four quadrants,  no masses, no organomegaly   Extremities: Extremities rt >lt arm edema, has fistula in lt arm , +1 le edema   Skin: Skin color, texture, turgor normal, no rashes or lesions   Pysch: Normal mood and affect   Neurologic: Gen weakness, more in LE        Labs   CBC:   Lab Results   Component Value Date/Time    WBC 5.7 10/25/2022 02:51 PM    RBC 3.62 10/25/2022 02:51 PM    HGB 10.8 10/25/2022 02:51 PM    HCT 34.8 10/25/2022 02:51 PM    MCV 96.2 10/25/2022 02:51 PM    RDW 16.7 10/25/2022 02:51 PM     10/25/2022 02:51 PM     CMP:  Lab Results   Component Value Date/Time     10/25/2022 02:51 PM    K 5.0 10/25/2022 02:51 PM    CL 96 10/25/2022 02:51 PM    CO2 31 10/25/2022 02:51 PM    BUN 39 10/25/2022 02:51 PM    CREATININE 4.7 10/25/2022 02:51 PM    GFRAA 9 09/19/2022 05:08 AM    AGRATIO 0.7 10/25/2022 02:51 PM    LABGLOM 9 10/25/2022 02:51 PM    GLUCOSE 87 10/25/2022 02:51 PM    PROT 8.3 10/25/2022 02:51 PM    CALCIUM 9.0 10/25/2022 02:51 PM    BILITOT 0.4 10/25/2022 02:51 PM    ALKPHOS 114 10/25/2022 02:51 PM    AST 18 10/25/2022 02:51 PM    ALT 8 10/25/2022 02:51 PM     PT/INR:  No results found for: PTINR  HgBA1c:  Lab Results   Component Value Date    LABA1C 5.4 05/06/2022     Lab Results   Component Value Date    TROPONINI 0.22 (H) 10/26/2022         Cardiac Data     Last EKG:    nsr 1avb    Echo:    2/2022     Summary   Definity contrast administered. Left ventricular systolic function is mildly reduced with visually estimated   EF of 45%. Endocardium not entirely well visualized but no obvious segmental wall   motion abnormalities. Diastolic dysfunction grade and filling pressure are indeterminate.    Mild concentric left ventricular hypertrophy. Unable to obtain SPAP due to lack of tricuspid regurgitation. Valves are not well visualized but there are no obvious structural   abnormalities or color flow abnormalities seen on doppler. 9- [facility]. 60%    Stress Test:    9/2022    Resting ECG    There is a small-moderate size fixed inferior defect suggestive of scar. There is no ischemia. Overall LV function is normal with EF=72%    Possible inferior basal hypokinesis    Intermediate risk       Cath:    Studies:     Cxr    Impression   No acute cardiopulmonary findings. Stable cardiomegaly. I have reviewed labs and imaging/xray/diagnostic testing in this note. Assessment and Plan          Patient Active Problem List   Diagnosis    Unresponsive episode    Syncope    Acute respiratory failure with hypoxia and hypercapnia (HCC)    Pneumonia due to infectious organism    ESRD on dialysis [facility])    Chronic respiratory failure requiring continuous mechanical ventilation through tracheostomy ([address]    Acute on chronic respiratory failure with hypoxia (HCC)    Acute pulmonary edema (HCC)    Rectal pain    History of CVA (cerebrovascular accident)    HCAP (healthcare-associated pneumonia)    Anemia, chronic disease    Anxiety    Chronic diastolic heart failure ([address]    Dysphagia, pharyngeal phase    Primary hypertension    GERD (gastroesophageal reflux disease)    Morbid obesity with BMI of 50.0-59.9, adult (HCC)    SILVER (obstructive sleep apnea)    Abnormal chest x-ray    Vaginal bleeding    SBO (small bowel obstruction) (HCC)    Fecal impaction (HCC)    Chest pain    Coronary artery disease involving native heart with unstable angina pectoris (HCC)    Mixed hyperlipidemia    End stage renal disease ([address]       Chest pain, atypical, elev trop, similar to prior values. Would tx medically, plan would be for med mgtment given comorbidites. Not a good candidate for cath.      Elev trop, likely supply/demand mismatch, continue aspirin    H/o dvt, afib, on eliquis    Htn, ICM/lv dysfunction, bblocker, no ace d/t renal insuffic, can do f/u echo as either inpt/outpt    ESRD as per primary svc     No further inpt cv w/u or tx planned, will sign off, please call with ?s     Thank you for allowing us to participate in the care of Bianka Malave. Please call me with any questions [phone].     Jonny Lawson MD, [facility]   Interventional Cardiologist  [address]  [phone] [facility]  [phone] [address] [place] [address]  10/26/2022 2:57 PM

## 2022-10-26 NOTE — CONSULTS
[facility]  Nephrology Consult Note           Reason for Consult: ESRD  Requesting Physician:  Dr. Bola Gonzalez    Chief Complaint:  No chief complaint on file. History of Present Illness on 10/26/2022:    71 y.o. yo female with PMH of ESRD on HD Tuesday Thursday Saturday, chronic respiratory failure status post tracheostomy, diabetes, hypertension, A. fib on Coumadin, pulmonary hypertension, CVA who is admitted for chest pain and T wave inversions on EKG  Patient presented to the emergency room yesterday with a 2-hour history of on and off chest pain. She was also noted to have mildly elevated troponin along with T wave inversions that were new and was transferred to [facility].  Patient had dialysis on 10/25 and her post weight was 130 kg. Her target weight is listed at 120 kg. Patient had a shorter dialysis run on Saturday.   She has had history of cutting the treatment short intermittently at the outpatient dialysis unit    Past Medical History:        Diagnosis Date    Acute and chronic respiratory failure (acute-on-chronic) ([facility])     Acute blood loss anemia 7/1/2020    Acute deep vein thrombosis (DVT) of brachial vein of left upper extremity ([address] 2/20/2019    Formatting of this note might be different from the original. Dx February 2019    Anxiety disorder     Atherosclerotic heart disease of native coronary artery without angina pectoris     Bleeding hemorrhoids 6/29/2020    Cerebellar infarct ([address] 9/29/2019    Cerebral infarction ([facility])     Chronic pain syndrome     Dependence on renal dialysis ([address]     Dependence on respirator ([facility])     End stage renal disease ([address]     Gastro-esophageal reflux disease with esophagitis, without bleeding     Insomnia     Major depressive disorder, recurrent ([address]     Morbid obesity due to excess calories ([facility])     Muscle weakness     Obstructive sleep apnea (adult) (pediatric)     Other hyperlipidemia     Other voice and resonance disorders     Personal history of COVID-19     Pulmonary hypertension (HCC)     Tracheostomy status ([facility]     Type 2 diabetes mellitus without complications ([facility]     Unspecified atrial fibrillation [facility])        Past Surgical History:        Procedure Laterality Date    COLECTOMY N/A 5/10/2022    EXPLORATORY LAPAROTOMY, LYSIS OF ADHESIONS performed by Elvia Gale MD at [place] [address] Medications:    No current facility-administered medications on file prior to encounter.      Current Outpatient Medications on File Prior to Encounter   Medication Sig Dispense Refill    apixaban (ELIQUIS) 2.5 MG TABS tablet Take 2.5 mg by mouth 2 times daily      loperamide (IMODIUM) 2 MG capsule Take 2 mg by mouth 4 times daily as needed for Diarrhea      promethazine (PHENERGAN) 12.5 MG tablet Take 12.5 mg by mouth every 6 hours as needed for Nausea      traZODone (DESYREL) 50 MG tablet Take 50 mg by mouth nightly      vitamin B-12 (CYANOCOBALAMIN) 500 MCG tablet Take 500 mcg by mouth daily      metoprolol tartrate (LOPRESSOR) 25 MG tablet Take 1 tablet by mouth 2 times daily 60 tablet 3    insulin glargine (LANTUS) 100 UNIT/ML injection vial Inject 15 Units into the skin 2 times daily (Patient not taking: Reported on 9/15/2022) 10 mL 3    [DISCONTINUED] warfarin (COUMADIN) 3 MG tablet Take 1 tablet by mouth nightly (Patient not taking: Reported on 9/15/2022) 30 tablet 2    hydrocortisone (ANUSOL-HC) 2.5 % CREA rectal cream Place rectally 2 times daily 1 each 0    aspirin 81 MG EC tablet Take 81 mg by mouth daily (Patient not taking: Reported on 9/15/2022)      cyanocobalamin 1000 MCG tablet Take 500 mcg by mouth daily (Patient not taking: Reported on 9/15/2022)      QUEtiapine (SEROQUEL) 25 MG tablet Take 12.5 mg by mouth nightly (Patient not taking: Reported on 9/15/2022)      sodium polystyrene (KAYEXALATE) 15 GM/60ML suspension Take 15 g by mouth 4 times daily Sun, Mon, Wed, Fri (Patient not taking: Reported on 9/15/2022)      calcium acetate 667 MG TABS Take 1,334 mg by mouth 3 times daily (with meals)      hydrocortisone 1 % cream Apply topically 2 times daily Apply topically 2 times daily.       acetaminophen (TYLENOL) 325 MG tablet Take 650 mg by mouth every 6 hours as needed for Pain      atorvastatin (LIPITOR) 80 MG tablet Take 80 mg by mouth at bedtime      diphenhydrAMINE (BENADRYL) 25 MG capsule Take 25 mg by mouth every 8 hours as needed       docusate sodium (COLACE) 100 MG capsule Take 100 mg by mouth 2 times daily (Patient not taking: Reported on 9/15/2022)      lactulose encephalopathy 10 GM/15ML SOLN solution Take 20 g by mouth daily as needed  (Patient not taking: Reported on 9/15/2022)      polyethylene glycol (GLYCOLAX) 17 g packet Take 17 g by mouth daily as needed for Constipation      ipratropium-albuterol (DUONEB) 0.5-2.5 (3) MG/3ML SOLN nebulizer solution Inhale 1 vial into the lungs every 4 hours      midodrine (PROAMATINE) 5 MG tablet Take 10 mg by mouth three times a week Tues thurs sat pre dialysis      omeprazole (PRILOSEC) 20 MG delayed release capsule Take 20 mg by mouth daily      chlorhexidine (PERIDEX) 0.12 % solution Take 15 mLs by mouth 2 times daily      sennosides-docusate sodium (SENOKOT-S) 8.6-50 MG tablet Take 2 tablets by mouth at bedtime  (Patient not taking: Reported on 9/15/2022)      sertraline (ZOLOFT) 50 MG tablet Take 50 mg by mouth daily      terazosin (HYTRIN) 1 MG capsule Take 1 mg by mouth nightly (Patient not taking: Reported on 9/15/2022)      ondansetron (ZOFRAN) 4 MG tablet Take 4 mg by mouth every 8 hours as needed for Nausea or Vomiting (Patient not taking: Reported on 9/15/2022)      [DISCONTINUED] furosemide (LASIX) 20 MG tablet Take 20 mg by mouth three times a week MWF (Patient not taking: Reported on 9/15/2022)         Allergies:  Haloperidol lactate and Ziprasidone hcl    Social History:    Social History     Socioeconomic History    Marital status:      Spouse name: Not on file    Number of children: Not on file    Years of education: Not on file    Highest education level: Not on file   Occupational History    Not on file   Tobacco Use    Smoking status: Former    Smokeless tobacco: Never   Substance and Sexual Activity    Alcohol use: Never    Drug use: Never    Sexual activity: Not Currently   Other Topics Concern    Not on file   Social History Narrative    Not on file     Social Determinants of Health     Financial Resource Strain: Not on file   Food Insecurity: Not on file   Transportation Needs: Not on file   Physical Activity: Not on file   Stress: Not on file   Social Connections: Not on file   Intimate Partner Violence: Not on file   Housing Stability: Not on file       Family History:   No family history on file. Review of Systems:   Unable to obtain    Physical exam:   Constitutional:  VITALS:  /68   Pulse 72   Temp 98.3 °F (36.8 °C) (Oral)   Resp 14   Ht 5' 4\" (1.626 m)   Wt 286 lb 9.6 oz (130 kg)   SpO2 97%   BMI 49.19 kg/m²   Exam deferred on 10/26 as patient was placed on droplet precaution for COVID    Data/  Recent Labs     10/25/22  1451   WBC 5.7   HGB 10.8*   HCT 34.8*   MCV 96.2        Recent Labs     10/25/22  1451 10/25/22  1917     --    K 5.0  --    CL 96*  --    CO2 31  --    GLUCOSE 87  --    PHOS  --  2.8   MG  --  1.50*   BUN 39*  --    CREATININE 4.7*  --    LABGLOM 9*  --        Assessment  -ESRD on HD Tuesday Thursday Saturday at [facility]   Target weight listed as 120 kg  -Chest pain with new T wave inversions noted on EKG, per cardiology   Stress test done in September 2022 showed possible inferobasal hypokinesis  -Anemia, at goal  -Hypomagnesemia  -Chronic respiratory failure status post trach  -CKD/MBD    Plan  -HD on 10/27 per schedule, will try ultrafiltration as tolerated up to 4-5 l  -Follow cardiology recommendations  -Renal dose medications    Thank you for the consultation.   Please do not hesitate to call with questions. Corina Farris MD  Office: [phone]  Fax:    [phone]  [email or web address]

## 2022-10-26 NOTE — PROGRESS NOTES
Received patient from [facility] ED alert and oriented in no acute distress trache midline capped on room air Monitor number 50 verified with the cmu oriented to room and treatment plan bed alarm on bed and activated.

## 2022-10-26 NOTE — H&P
Hospital Medicine History & Physical      PCP: Nahum Mata MD    Date of Admission: 10/26/2022    Date of Service: Pt seen/examined on 10/26/2022 and Placed in Observation. Chief Complaint:    Chest pain    History Of Present Illness:    71 y.o. female who presented to [facility] with chest pain. Patient states she has had intermittent chest pain over past couple of weeks. No shortness of breath. No palpitations. Patient has chronic trach. No fever or chills.      Past Medical History:          Diagnosis Date    Acute and chronic respiratory failure (acute-on-chronic) ([facility])     Acute blood loss anemia 7/1/2020    Acute deep vein thrombosis (DVT) of brachial vein of left upper extremity ([address] 2/20/2019    Formatting of this note might be different from the original. Dx February 2019    Anxiety disorder     Atherosclerotic heart disease of native coronary artery without angina pectoris     Bleeding hemorrhoids 6/29/2020    Cerebellar infarct ([address] 9/29/2019    Cerebral infarction ([facility])     Chronic pain syndrome     Dependence on renal dialysis ([address]     Dependence on respirator ([facility])     End stage renal disease ([address]     Gastro-esophageal reflux disease with esophagitis, without bleeding     Insomnia     Major depressive disorder, recurrent ([address]     Morbid obesity due to excess calories ([facility])     Muscle weakness     Obstructive sleep apnea (adult) (pediatric)     Other hyperlipidemia     Other voice and resonance disorders     Personal history of COVID-19     Pulmonary hypertension ([address]     Tracheostomy status ([address]     Type 2 diabetes mellitus without complications ([address]     Unspecified atrial fibrillation ([address]        Past Surgical History:          Procedure Laterality Date    COLECTOMY N/A 5/10/2022    EXPLORATORY LAPAROTOMY, LYSIS OF ADHESIONS performed by Tha Ayala MD at [address] [place] [address]       Medications Prior to Admission:      Prior to Admission medications    Medication Sig Start Date End Date Taking? Authorizing Provider   apixaban (ELIQUIS) 2.5 MG TABS tablet Take 2.5 mg by mouth 2 times daily    Historical Provider, MD   loperamide (IMODIUM) 2 MG capsule Take 2 mg by mouth 4 times daily as needed for Diarrhea    Historical Provider, MD   promethazine (PHENERGAN) 12.5 MG tablet Take 12.5 mg by mouth every 6 hours as needed for Nausea    Historical Provider, MD   traZODone (DESYREL) 50 MG tablet Take 50 mg by mouth nightly    Historical Provider, MD   vitamin B-12 (CYANOCOBALAMIN) 500 MCG tablet Take 500 mcg by mouth daily    Historical Provider, MD   metoprolol tartrate (LOPRESSOR) 25 MG tablet Take 1 tablet by mouth 2 times daily 5/21/22   Warren Dunlap MD   insulin glargine (LANTUS) 100 UNIT/ML injection vial Inject 15 Units into the skin 2 times daily  Patient not taking: Reported on 9/15/2022 5/21/22   Warren Dunlap MD   warfarin (COUMADIN) 3 MG tablet Take 1 tablet by mouth nightly  Patient not taking: Reported on 9/15/2022 4/30/22 9/19/22  Kimberli Torres MD   hydrocortisone (ANUSOL-HC) 2.5 % CREA rectal cream Place rectally 2 times daily 3/3/22   Bon Nelson DO   aspirin 81 MG EC tablet Take 81 mg by mouth daily  Patient not taking: Reported on 9/15/2022    Historical Provider, MD   cyanocobalamin 1000 MCG tablet Take 500 mcg by mouth daily  Patient not taking: Reported on 9/15/2022    Historical Provider, MD   QUEtiapine (SEROQUEL) 25 MG tablet Take 12.5 mg by mouth nightly  Patient not taking: Reported on 9/15/2022    Historical Provider, MD   sodium polystyrene (KAYEXALATE) 15 GM/60ML suspension Take 15 g by mouth 4 times daily Sun, Mon, Wed, Fri  Patient not taking: Reported on 9/15/2022    Historical Provider, MD   calcium acetate 667 MG TABS Take 1,334 mg by mouth 3 times daily (with meals)    Historical Provider, MD   hydrocortisone 1 % cream Apply topically 2 times daily Apply topically 2 times daily.     Historical Provider, MD   acetaminophen (TYLENOL) 325 MG tablet Take 650 mg by mouth every 6 hours as needed for Pain    Historical Provider, MD   atorvastatin (LIPITOR) 80 MG tablet Take 80 mg by mouth at bedtime    Historical Provider, MD   diphenhydrAMINE (BENADRYL) 25 MG capsule Take 25 mg by mouth every 8 hours as needed     Historical Provider, MD   docusate sodium (COLACE) 100 MG capsule Take 100 mg by mouth 2 times daily  Patient not taking: Reported on 9/15/2022    Historical Provider, MD   lactulose encephalopathy 10 GM/15ML SOLN solution Take 20 g by mouth daily as needed   Patient not taking: Reported on 9/15/2022    Historical Provider, MD   polyethylene glycol (GLYCOLAX) 17 g packet Take 17 g by mouth daily as needed for Constipation    Historical Provider, MD   ipratropium-albuterol (DUONEB) 0.5-2.5 (3) MG/3ML SOLN nebulizer solution Inhale 1 vial into the lungs every 4 hours    Historical Provider, MD   midodrine (PROAMATINE) 5 MG tablet Take 10 mg by mouth three times a week Tues thannie sat pre dialysis    Historical Provider, MD   omeprazole (PRILOSEC) 20 MG delayed release capsule Take 20 mg by mouth daily    Historical Provider, MD   chlorhexidine (PERIDEX) 0.12 % solution Take 15 mLs by mouth 2 times daily    Historical Provider, MD   sennosides-docusate sodium (SENOKOT-S) 8.6-50 MG tablet Take 2 tablets by mouth at bedtime   Patient not taking: Reported on 9/15/2022    Historical Provider, MD   sertraline (ZOLOFT) 50 MG tablet Take 50 mg by mouth daily    Historical Provider, MD   terazosin (HYTRIN) 1 MG capsule Take 1 mg by mouth nightly  Patient not taking: Reported on 9/15/2022    Historical Provider, MD   ondansetron (ZOFRAN) 4 MG tablet Take 4 mg by mouth every 8 hours as needed for Nausea or Vomiting  Patient not taking: Reported on 9/15/2022    Historical Provider, MD   furosemide (LASIX) 20 MG tablet Take 20 mg by mouth three times a week [facility]  Patient not taking: Reported on 9/15/2022  9/19/22  Historical Provider, MD       Allergies: Haloperidol lactate and Ziprasidone hcl    Social History:      The patient currently lives [facility]    TOBACCO:   reports that she has quit smoking. She has never used smokeless tobacco.  ETOH:   reports no history of alcohol use. E-cigarette/Vaping       Questions Responses    E-cigarette/Vaping Use     Start Date     Passive Exposure     Quit Date     Counseling Given     Comments               Family History:      Reviewed and negative in regards to presenting illness/complaint. No family history on file. REVIEW OF SYSTEMS COMPLETED:   Pertinent positives as noted in the HPI. All other systems reviewed and negative. PHYSICAL EXAM PERFORMED:    /68   Pulse 72   Temp 98.3 °F (36.8 °C) (Oral)   Resp 14   SpO2 98%     General appearance:  No apparent distress, appears stated age and cooperative. HEENT:  Trach with PMV. Neck: Supple, with full range of motion. No jugular venous distention. Trachea midline. Respiratory:  Normal respiratory effort. Clear to auscultation, bilaterally without Rales/Wheezes/Rhonchi. Decrease in base  Cardiovascular:  Regular rate and rhythm with normal S1/S2 without murmurs, rubs or gallops. Abdomen: Soft, non-tender, non-distended with normal bowel sounds. Obese  Musculoskeletal:  No clubbing, cyanosis edema bilaterally. Full range of motion without deformity. Edema right arm  Skin: Skin color, texture, turgor normal.  No rashes or lesions. Neurologic:  Neurovascularly intact without any focal sensory/motor deficits.  Cranial nerves: II-XII intact, grossly non-focal.  Psychiatric:  Alert and oriented, thought content appropriate, normal insight  Capillary Refill: Brisk,3 seconds, normal  Peripheral Pulses: +2 palpable, equal bilaterally       Labs:     Recent Labs     10/25/22  1451   WBC 5.7   HGB 10.8*   HCT 34.8*        Recent Labs     10/25/22  1451 10/25/22  1917     --    K 5.0  --    CL 96*  --    CO2 31  --    BUN 39*  --    CREATININE 4.7*  -- CALCIUM 9.0  --    PHOS  --  2.8     Recent Labs     10/25/22  1451   AST 18   ALT 8*   BILITOT 0.4   ALKPHOS 114     No results for input(s): INR in the last 72 hours. Recent Labs     10/25/22  1451 10/25/22  1917   TROPONINI 0.23* 0.18*       Urinalysis:    No results found for: Gus Gupta, BACTERIA, RBCUA, BLOODU, [address], GLUCOSEU    Radiology:     CXR: I have reviewed the CXR with the following interpretation:   No acute changes  EKG:  I have reviewed the EKG with the following interpretation:   First degree AVB    No orders to display       Consults:    IP CONSULT TO CARDIOLOGY  IP CONSULT TO NEPHROLOGY    ASSESSMENT:    Active Hospital Problems    Diagnosis Date Noted    Chest pain [R07.9] 09/15/2022     Priority: Medium         PLAN:    Atypical chest pain. Appreciate cardiology evaluation. Check echo. No ischemic work up planned. Trend troponins. On ASA and statin. ESRD chronic. Nephrology notified. Continue HD T-TH-Sat  Atrial fibrillation. Continue Eliquis. Continue rate control. DMII. Continue to monitor blood sugar. Continue bolus correction. Morbid Obesity -  With Body mass index is 49.19 kg/m². Complicating assessment and treatment. Placing patient at risk for multiple co-morbidities as well as early death and contributing to the patient's presentation. Counseled on weight loss. DVT Prophylaxis: Eliquis  Diet: ADULT DIET; Regular; 5 carb choices (75 gm/meal); Low Fat/Low Chol/High Fiber/ASHANTI; Low Potassium (Less than 3000 mg/day); Low Phosphorus (Less than 1000 mg); 1800 ml; No Caffeine  Diet NPO  Code Status: Prior    PT/OT Eval Status: Ordered    Dispo - ECF when stable       Karrie Blount MD    Thank you Marian Haskins MD for the opportunity to be involved in this patient's care. If you have any questions or concerns please feel free to contact me at [phone].

## 2022-10-27 PROBLEM — Z93.0 TRACHEOSTOMY STATUS (HCC): Status: ACTIVE | Noted: 2022-10-27

## 2022-10-27 LAB
ALBUMIN SERPL-MCNC: 3.2 G/DL (ref 3.4–5)
ANION GAP SERPL CALCULATED.3IONS-SCNC: 10 MMOL/L (ref 3–16)
BUN BLDV-MCNC: 59 MG/DL (ref 7–20)
CALCIUM SERPL-MCNC: 8.9 MG/DL (ref 8.3–10.6)
CHLORIDE BLD-SCNC: 99 MMOL/L (ref 99–110)
CO2: 27 MMOL/L (ref 21–32)
CREAT SERPL-MCNC: 6.6 MG/DL (ref 0.6–1.2)
EKG ATRIAL RATE: 71 BPM
EKG DIAGNOSIS: NORMAL
EKG P AXIS: 70 DEGREES
EKG P-R INTERVAL: 264 MS
EKG Q-T INTERVAL: 416 MS
EKG QRS DURATION: 80 MS
EKG QTC CALCULATION (BAZETT): 452 MS
EKG R AXIS: 11 DEGREES
EKG T AXIS: 55 DEGREES
EKG VENTRICULAR RATE: 71 BPM
ESTIMATED AVERAGE GLUCOSE: 111.2 MG/DL
GFR SERPL CREATININE-BSD FRML MDRD: 6 ML/MIN/{1.73_M2}
GLUCOSE BLD-MCNC: 101 MG/DL (ref 70–99)
GLUCOSE BLD-MCNC: 117 MG/DL (ref 70–99)
GLUCOSE BLD-MCNC: 121 MG/DL (ref 70–99)
GLUCOSE BLD-MCNC: 81 MG/DL (ref 70–99)
GLUCOSE BLD-MCNC: 86 MG/DL (ref 70–99)
GLUCOSE BLD-MCNC: 94 MG/DL (ref 70–99)
HBA1C MFR BLD: 5.5 %
HCT VFR BLD CALC: 33.3 % (ref 36–48)
HEMOGLOBIN: 10.3 G/DL (ref 12–16)
LV EF: 48 %
LVEF MODALITY: NORMAL
MCH RBC QN AUTO: 30.5 PG (ref 26–34)
MCHC RBC AUTO-ENTMCNC: 31.1 G/DL (ref 31–36)
MCV RBC AUTO: 98.3 FL (ref 80–100)
PDW BLD-RTO: 16.1 % (ref 12.4–15.4)
PERFORMED ON: ABNORMAL
PERFORMED ON: NORMAL
PERFORMED ON: NORMAL
PHOSPHORUS: 5 MG/DL (ref 2.5–4.9)
PLATELET # BLD: 141 K/UL (ref 135–450)
PLATELET SLIDE REVIEW: ADEQUATE
PMV BLD AUTO: 8.8 FL (ref 5–10.5)
POTASSIUM SERPL-SCNC: 5 MMOL/L (ref 3.5–5.1)
RBC # BLD: 3.39 M/UL (ref 4–5.2)
SLIDE REVIEW: ABNORMAL
SODIUM BLD-SCNC: 136 MMOL/L (ref 136–145)
WBC # BLD: 7 K/UL (ref 4–11)

## 2022-10-27 PROCEDURE — 94761 N-INVAS EAR/PLS OXIMETRY MLT: CPT

## 2022-10-27 PROCEDURE — G0378 HOSPITAL OBSERVATION PER HR: HCPCS

## 2022-10-27 PROCEDURE — 80069 RENAL FUNCTION PANEL: CPT

## 2022-10-27 PROCEDURE — 94640 AIRWAY INHALATION TREATMENT: CPT

## 2022-10-27 PROCEDURE — 85027 COMPLETE CBC AUTOMATED: CPT

## 2022-10-27 PROCEDURE — 6370000000 HC RX 637 (ALT 250 FOR IP): Performed by: INTERNAL MEDICINE

## 2022-10-27 PROCEDURE — 83036 HEMOGLOBIN GLYCOSYLATED A1C: CPT

## 2022-10-27 PROCEDURE — C8923 2D TTE W OR W/O FOL W/CON,CO: HCPCS

## 2022-10-27 PROCEDURE — 6360000002 HC RX W HCPCS: Performed by: NURSE PRACTITIONER

## 2022-10-27 PROCEDURE — 90935 HEMODIALYSIS ONE EVALUATION: CPT

## 2022-10-27 PROCEDURE — 93005 ELECTROCARDIOGRAM TRACING: CPT | Performed by: INTERNAL MEDICINE

## 2022-10-27 PROCEDURE — 93010 ELECTROCARDIOGRAM REPORT: CPT | Performed by: INTERNAL MEDICINE

## 2022-10-27 PROCEDURE — 6370000000 HC RX 637 (ALT 250 FOR IP): Performed by: NURSE PRACTITIONER

## 2022-10-27 PROCEDURE — 36415 COLL VENOUS BLD VENIPUNCTURE: CPT

## 2022-10-27 PROCEDURE — 2700000000 HC OXYGEN THERAPY PER DAY

## 2022-10-27 PROCEDURE — 2580000003 HC RX 258: Performed by: INTERNAL MEDICINE

## 2022-10-27 RX ORDER — LOPERAMIDE HYDROCHLORIDE 2 MG/1
2 CAPSULE ORAL 4 TIMES DAILY PRN
Status: DISCONTINUED | OUTPATIENT
Start: 2022-10-27 | End: 2022-10-28 | Stop reason: HOSPADM

## 2022-10-27 RX ORDER — MIDODRINE HYDROCHLORIDE 5 MG/1
TABLET ORAL
Status: DISPENSED
Start: 2022-10-27 | End: 2022-10-28

## 2022-10-27 RX ORDER — MIDODRINE HYDROCHLORIDE 5 MG/1
10 TABLET ORAL
Status: DISCONTINUED | OUTPATIENT
Start: 2022-10-27 | End: 2022-10-28 | Stop reason: HOSPADM

## 2022-10-27 RX ADMIN — DIPHENHYDRAMINE HCL 25 MG: 25 TABLET ORAL at 20:48

## 2022-10-27 RX ADMIN — SODIUM CHLORIDE, PRESERVATIVE FREE 10 ML: 5 INJECTION INTRAVENOUS at 08:51

## 2022-10-27 RX ADMIN — SERTRALINE HYDROCHLORIDE 50 MG: 50 TABLET ORAL at 13:43

## 2022-10-27 RX ADMIN — APIXABAN 2.5 MG: 2.5 TABLET, FILM COATED ORAL at 20:48

## 2022-10-27 RX ADMIN — ALBUTEROL SULFATE 2.5 MG: 2.5 SOLUTION RESPIRATORY (INHALATION) at 09:11

## 2022-10-27 RX ADMIN — TRAZODONE HYDROCHLORIDE 50 MG: 50 TABLET ORAL at 20:48

## 2022-10-27 RX ADMIN — HYDROCORTISONE ACETATE: 1 CREAM TOPICAL at 20:48

## 2022-10-27 RX ADMIN — DIPHENHYDRAMINE HCL 25 MG: 25 TABLET ORAL at 13:48

## 2022-10-27 RX ADMIN — SODIUM CHLORIDE, PRESERVATIVE FREE 10 ML: 5 INJECTION INTRAVENOUS at 20:52

## 2022-10-27 RX ADMIN — METOPROLOL TARTRATE 25 MG: 25 TABLET, FILM COATED ORAL at 20:48

## 2022-10-27 RX ADMIN — APIXABAN 2.5 MG: 2.5 TABLET, FILM COATED ORAL at 08:00

## 2022-10-27 RX ADMIN — ALBUTEROL SULFATE 2.5 MG: 2.5 SOLUTION RESPIRATORY (INHALATION) at 05:25

## 2022-10-27 RX ADMIN — ACETAMINOPHEN 650 MG: 325 TABLET ORAL at 20:48

## 2022-10-27 RX ADMIN — ASPIRIN 81 MG 81 MG: 81 TABLET ORAL at 13:43

## 2022-10-27 RX ADMIN — LOPERAMIDE HYDROCHLORIDE 2 MG: 2 CAPSULE ORAL at 13:43

## 2022-10-27 RX ADMIN — ALBUTEROL SULFATE 2.5 MG: 2.5 SOLUTION RESPIRATORY (INHALATION) at 12:14

## 2022-10-27 RX ADMIN — HYDROCORTISONE ACETATE: 1 CREAM TOPICAL at 08:00

## 2022-10-27 RX ADMIN — CYANOCOBALAMIN TAB 500 MCG 500 MCG: 500 TAB at 13:43

## 2022-10-27 RX ADMIN — ATORVASTATIN CALCIUM 40 MG: 40 TABLET, FILM COATED ORAL at 20:48

## 2022-10-27 RX ADMIN — ALBUTEROL SULFATE 2.5 MG: 2.5 SOLUTION RESPIRATORY (INHALATION) at 16:15

## 2022-10-27 RX ADMIN — LOPERAMIDE HYDROCHLORIDE 2 MG: 2 CAPSULE ORAL at 20:48

## 2022-10-27 RX ADMIN — ALBUTEROL SULFATE 2.5 MG: 2.5 SOLUTION RESPIRATORY (INHALATION) at 21:00

## 2022-10-27 RX ADMIN — PANTOPRAZOLE SODIUM 40 MG: 40 TABLET, DELAYED RELEASE ORAL at 05:48

## 2022-10-27 ASSESSMENT — PAIN DESCRIPTION - ORIENTATION: ORIENTATION: LOWER;MID

## 2022-10-27 ASSESSMENT — PAIN DESCRIPTION - PAIN TYPE: TYPE: ACUTE PAIN

## 2022-10-27 ASSESSMENT — PAIN SCALES - GENERAL
PAINLEVEL_OUTOF10: 0

## 2022-10-27 ASSESSMENT — PAIN DESCRIPTION - LOCATION: LOCATION: BUTTOCKS

## 2022-10-27 ASSESSMENT — PAIN DESCRIPTION - DESCRIPTORS: DESCRIPTORS: ACHING

## 2022-10-27 NOTE — PROGRESS NOTES
10/27/22 0923   Ventilator Settings   FiO2  40 %   Surgical Airway (Trach)   No placement date or time found.    Surgical Airway Type: Tracheostomy  Comments: PATIENT HAS A CUFFED 6 PROXIMAL XLT TRACH CUFF IS DEFLATED   Status Secured   Site Assessment Clean;Dry   Inner Cannula Care Changed/new   Ties Assessment Secure   Cuff Pressure   (DEFLATED)   Spare Trach at Bedside Yes   Ambu Bag With Mask at Bedside Yes

## 2022-10-27 NOTE — PROGRESS NOTES
Treatment time: 3.45 hours  Net UF: 3650 ml     Pre weight: 131.2 kg  Post weight:127.6 kg    Access used: RULAVF    Access function: well with  ml/min     Medications or blood products given: n/a     Regular outpatient schedule: TTS     Summary of response to treatment: Patient tolerated treatment poorly but without any complications. Patient remained stable throughout entire treatment. Pt screamed from the time she came into the unit until the time she was taken off of tx. Pt was removed from tx 35 min early due to pt saying \"you're holding here against my will. \"  Pt was immediately removed and educated about the effects of the excessive fluid she was carrying had on her heart and lungs. 10/27/22 0925 10/27/22 1315   Treatment   Time On 0935  --    Time Off  --  1300   Vital Signs   BP (!) 141/67 (!) 110/59   Temp 97.6 °F (36.4 °C) 97.9 °F (36.6 °C)   Heart Rate 71 90   Resp 20 20   Weight 289 lb 3.9 oz (131.2 kg) 281 lb 4.9 oz (127.6 kg)   Technical Checks   Machine Alarm Self Test Completed; Passed  --    Dialysis Bath   K+ (Potassium) 3  --    Ca+ (Calcium) 2.5  --    Na+ (Sodium) 138  --    HCO3 (Bicarb) 32  --

## 2022-10-27 NOTE — PROGRESS NOTES
10/27/22 1214   Treatment   Treatment Type HHN   $Treatment Type $Inhaled Therapy/Meds   Medications Albuterol   Pre-Tx Pulse 86   Pre-Tx Resps 16   Breath Sounds Pre-Tx MICHAEL Diminished   Breath Sounds Pre-Tx LLL Diminished   Breath Sounds Pre-Tx RUL Diminished   Breath Sounds Pre-Tx RML Diminished   Breath Sounds Pre-Tx RLL Diminished   Breath Sounds Post-Tx MICHAEL Diminished   Breath Sounds Post-Tx LLL Diminished   Breath Sounds Post-Tx RUL Diminished   Breath Sounds Post-Tx RML Diminished   Breath Sounds Post-Tx RLL Diminished   Post-Tx Pulse 86   Post-Tx Resps 16   Delivery Source Oxygen   Position Semi-Khushbu's   Treatment Tolerance Well   Oxygen Therapy/Pulse Ox   O2 Device Trach mask   O2 Flow Rate (L/min) 8 L/min   FiO2  40 %   Resp 18

## 2022-10-27 NOTE — PROGRESS NOTES
10/27/22 0923   Oxygen Therapy/Pulse Ox   O2 Therapy Oxygen   O2 Device Trach mask   O2 Flow Rate (L/min) 8 L/min   FiO2  40 %   Patient being transported on oxygen.

## 2022-10-27 NOTE — PROGRESS NOTES
10/27/22 0915   Respiratory   Respiratory Pattern Regular   Respiratory Depth Normal   Respiratory Quality/Effort Unlabored   Chest Assessment Chest expansion symmetrical   L Breath Sounds Diminished   R Breath Sounds Diminished   Cough/Sputum   Sputum How Obtained Tracheal   Sputum Amount Small   Sputum Color Clear   Tenacity Thin   Additional Respiratory Assessments   Heart Rate 70   Resp 22   SpO2 99 % Additional Safety/Bands:

## 2022-10-27 NOTE — RT PROTOCOL NOTE
RT Inhaler-Nebulizer Bronchodilator Protocol Note    There is a bronchodilator order in the chart from a provider indicating to follow the RT Bronchodilator Protocol and there is an Initiate RT Inhaler-Nebulizer Bronchodilator Protocol order as well (see protocol at bottom of note). CXR Findings:  XR CHEST PORTABLE    Result Date: 10/25/2022  No acute cardiopulmonary findings. Stable cardiomegaly. The findings from the last RT Protocol Assessment were as follows:   History Pulmonary Disease: Smoker 15 pack years or more  Respiratory Pattern: Mild dyspnea at rest, irregular pattern, or RR 21-25 bpm  Breath Sounds: Slightly diminished and/or crackles  Cough: Strong, productive  Indication for Bronchodilator Therapy: On home bronchodilators (pt states that she takes hhn 4Xdaily per home regimen)  Bronchodilator Assessment Score: 8    Aerosolized bronchodilator medication orders have been revised according to the RT Inhaler-Nebulizer Bronchodilator Protocol below. Respiratory Therapist to perform RT Therapy Protocol Assessment initially then follow the protocol. Repeat RT Therapy Protocol Assessment PRN for score 0-3 or on second treatment, BID, and PRN for scores above 3. No Indications - adjust the frequency to every 6 hours PRN wheezing or bronchospasm, if no treatments needed after 48 hours then discontinue using Per Protocol order mode. If indication present, adjust the RT bronchodilator orders based on the Bronchodilator Assessment Score as indicated below. Use Inhaler orders unless patient has one or more of the following: on home nebulizer, not able to hold breath for 10 seconds, is not alert and oriented, cannot activate and use MDI correctly, or respiratory rate 25 breaths per minute or more, then use the equivalent nebulizer order(s) with same Frequency and PRN reasons based on the score.   If a patient is on this medication at home then do not decrease Frequency below that used at home.    0-3 - enter or revise RT bronchodilator order(s) to equivalent RT Bronchodilator order with Frequency of every 4 hours PRN for wheezing or increased work of breathing using Per Protocol order mode. 4-6 - enter or revise RT Bronchodilator order(s) to two equivalent RT bronchodilator orders with one order with BID Frequency and one order with Frequency of every 4 hours PRN wheezing or increased work of breathing using Per Protocol order mode. 7-10 - enter or revise RT Bronchodilator order(s) to two equivalent RT bronchodilator orders with one order with TID Frequency and one order with Frequency of every 4 hours PRN wheezing or increased work of breathing using Per Protocol order mode. 11-13 - enter or revise RT Bronchodilator order(s) to one equivalent RT bronchodilator order with QID Frequency and an Albuterol order with Frequency of every 4 hours PRN wheezing or increased work of breathing using Per Protocol order mode. Greater than 13 - enter or revise RT Bronchodilator order(s) to one equivalent RT bronchodilator order with every 4 hours Frequency and an Albuterol order with Frequency of every 2 hours PRN wheezing or increased work of breathing using Per Protocol order mode. RT to enter RT Home Evaluation for COPD & MDI Assessment order using Per Protocol order mode.     Electronically signed by Piero Simental RCP on 10/26/2022 at 9:52 PM

## 2022-10-27 NOTE — PROGRESS NOTES
[facility]  Nephrology Follow up Note           Reason for Consult: ESRD  Requesting Physician:  Dr. Elizabeth Stephensting history  Seen and examined at HD with 3-4l UFG and using midodrine ; she is 10 kg over her TW    ROS: No chest pain/fever/nausea/vomiting  PSFH: No visitor    Scheduled Meds:   midodrine  10 mg Oral Once per day on Tue Thu Sat    sodium chloride flush  5-40 mL IntraVENous 2 times per day    aspirin  81 mg Oral Daily    atorvastatin  40 mg Oral Nightly    apixaban  2.5 mg Oral BID    metoprolol tartrate  25 mg Oral BID    pantoprazole  40 mg Oral QAM AC    sertraline  50 mg Oral Daily    traZODone  50 mg Oral Nightly    vitamin B-12  500 mcg Oral Daily    hydrocortisone   Topical BID    insulin lispro  0-4 Units SubCUTAneous TID WC    insulin lispro  0-4 Units SubCUTAneous Nightly    albuterol  2.5 mg Nebulization Q4H WA     Continuous Infusions:   sodium chloride      dextrose       PRN Meds:.sodium chloride flush, sodium chloride, ondansetron, acetaminophen **OR** acetaminophen, perflutren lipid microspheres, polyethylene glycol, promethazine, perflutren lipid microspheres, glucose, dextrose bolus **OR** dextrose bolus, glucagon (rDNA), dextrose, diphenhydrAMINE      History of Present Illness on 10/26/2022:    71 y.o. yo female with PMH of ESRD on HD Tuesday Thursday Saturday, chronic respiratory failure status post tracheostomy, diabetes, hypertension, A. fib on Coumadin, pulmonary hypertension, CVA who is admitted for chest pain and T wave inversions on EKG  Patient presented to the emergency room yesterday with a 2-hour history of on and off chest pain. She was also noted to have mildly elevated troponin along with T wave inversions that were new and was transferred to [facility].  Patient had dialysis on 10/25 and her post weight was 130 kg. Her target weight is listed at 120 kg. Patient had a shorter dialysis run on Saturday.   She has had history of cutting the treatment short intermittently at the outpatient dialysis unit      Physical exam:   Constitutional:  VITALS:  /81   Pulse 70   Temp 98.2 °F (36.8 °C) (Oral)   Resp 22   Ht 5' 4\" (1.626 m)   Wt 286 lb 4.8 oz (129.9 kg)   SpO2 99%   BMI 49.14 kg/m²   Gen: alert, awake  Neck: No JVD  Skin: Unremarkable  Cardiovascular:  S1, S2 without m/r/g   Respiratory: CTA B without w/r/r; respiratory effort normal  Abdomen:  soft, nt, nd,   Extremities: 1+ thigh lower extremity edema and in pannus  Neuro/Psy: AAoriented times 3 ; moves all 4 ext      Data/  Recent Labs     10/25/22  1451 10/27/22  0534   WBC 5.7 7.0   HGB 10.8* 10.3*   HCT 34.8* 33.3*   MCV 96.2 98.3    141       Recent Labs     10/25/22  1451 10/25/22  1917 10/27/22  0935     --  136   K 5.0  --  5.0   CL 96*  --  99   CO2 31  --  27   GLUCOSE 87  --  86   PHOS  --  2.8 5.0*   MG  --  1.50*  --    BUN 39*  --  59*   CREATININE 4.7*  --  6.6*   LABGLOM 9*  --  6*          Summary of echo from 10/27/22   Limited only f/u for LVEF. LV systolic function is mildly reduced with a visually estimated EF of    45-50%. No obvious segmental wall motion abnormality. Assessment  -ESRD on HD Tuesday Thursday Saturday at [facility]   Target weight listed as 120 kg  -Chest pain with new T wave inversions noted on EKG, per cardiology   Stress test done in September 2022 showed possible inferobasal hypokinesis   Echo noted as above   -Anemia, at goal  -Hypomagnesemia  -Chronic respiratory failure status post trach  -CKD/MBD    Plan  -HD on 10/27 per schedule, will try ultrafiltration as tolerated up to 3-4 kg  as BP low, using midodrine 10 mg    Counseled her and pt is now ok staying for 3.5 h   -Renal dose medications    Thank you for the consultation. Please do not hesitate to call with questions. Jr Hand MD  Office: [phone]  Fax:    [phone] [id number] [facility]

## 2022-10-27 NOTE — PROGRESS NOTES
10/27/22 0915   Treatment   Treatment Type HHN   $Treatment Type $Inhaled Therapy/Meds   Medications Albuterol   Pre-Tx Pulse 70   Pre-Tx Resps 16   Breath Sounds Pre-Tx MICHAEL Diminished   Breath Sounds Pre-Tx LLL Diminished   Breath Sounds Pre-Tx RUL Diminished   Breath Sounds Pre-Tx RML Diminished   Breath Sounds Pre-Tx RLL Diminished   Breath Sounds Post-Tx MICHAEL Diminished   Breath Sounds Post-Tx LLL Diminished   Breath Sounds Post-Tx RUL Diminished   Breath Sounds Post-Tx RML Diminished   Breath Sounds Post-Tx RLL Diminished   Post-Tx Pulse 70   Delivery Source Oxygen   Position Semi-Ríos's   Treatment Tolerance Well   Oxygen Therapy/Pulse Ox   O2 Therapy Oxygen   $Oxygen $Daily Charge   O2 Device Trach mask   O2 Flow Rate (L/min) 8 L/min   FiO2  28 %   Heart Rate 70   Resp 22   SpO2 99 %   $Pulse Oximeter $Spot check (multiple/continuous)   Cough/Sputum   Sputum How Obtained Tracheal   Sputum Amount Small   Sputum Color Clear   Tenacity Thin

## 2022-10-27 NOTE — PROGRESS NOTES
10/27/22 0915   Respiratory   Respiratory Pattern Regular   Respiratory Depth Normal   Respiratory Quality/Effort Unlabored   Chest Assessment Chest expansion symmetrical   L Breath Sounds Diminished   R Breath Sounds Diminished   Cough/Sputum   Sputum How Obtained Tracheal   Sputum Amount Small   Sputum Color Clear   Tenacity Thin   Additional Respiratory Assessments   Heart Rate 70   Resp 22   SpO2 99 %

## 2022-10-28 VITALS
RESPIRATION RATE: 18 BRPM | HEIGHT: 64 IN | DIASTOLIC BLOOD PRESSURE: 60 MMHG | OXYGEN SATURATION: 100 % | HEART RATE: 92 BPM | BODY MASS INDEX: 47.54 KG/M2 | WEIGHT: 278.44 LBS | SYSTOLIC BLOOD PRESSURE: 126 MMHG | TEMPERATURE: 98.4 F

## 2022-10-28 LAB
ALBUMIN SERPL-MCNC: 3.2 G/DL (ref 3.4–5)
ANION GAP SERPL CALCULATED.3IONS-SCNC: 9 MMOL/L (ref 3–16)
BUN BLDV-MCNC: 43 MG/DL (ref 7–20)
CALCIUM SERPL-MCNC: 8.8 MG/DL (ref 8.3–10.6)
CHLORIDE BLD-SCNC: 99 MMOL/L (ref 99–110)
CO2: 28 MMOL/L (ref 21–32)
CREAT SERPL-MCNC: 4.7 MG/DL (ref 0.6–1.2)
GFR SERPL CREATININE-BSD FRML MDRD: 9 ML/MIN/{1.73_M2}
GLUCOSE BLD-MCNC: 104 MG/DL (ref 70–99)
GLUCOSE BLD-MCNC: 105 MG/DL (ref 70–99)
GLUCOSE BLD-MCNC: 121 MG/DL (ref 70–99)
GLUCOSE BLD-MCNC: 121 MG/DL (ref 70–99)
PERFORMED ON: ABNORMAL
PHOSPHORUS: 4.6 MG/DL (ref 2.5–4.9)
POTASSIUM SERPL-SCNC: 4.5 MMOL/L (ref 3.5–5.1)
SODIUM BLD-SCNC: 136 MMOL/L (ref 136–145)

## 2022-10-28 PROCEDURE — 6370000000 HC RX 637 (ALT 250 FOR IP): Performed by: NURSE PRACTITIONER

## 2022-10-28 PROCEDURE — 94761 N-INVAS EAR/PLS OXIMETRY MLT: CPT

## 2022-10-28 PROCEDURE — 6370000000 HC RX 637 (ALT 250 FOR IP): Performed by: INTERNAL MEDICINE

## 2022-10-28 PROCEDURE — 2580000003 HC RX 258: Performed by: INTERNAL MEDICINE

## 2022-10-28 PROCEDURE — 80069 RENAL FUNCTION PANEL: CPT

## 2022-10-28 PROCEDURE — G0378 HOSPITAL OBSERVATION PER HR: HCPCS

## 2022-10-28 PROCEDURE — 6360000002 HC RX W HCPCS: Performed by: NURSE PRACTITIONER

## 2022-10-28 PROCEDURE — 2700000000 HC OXYGEN THERAPY PER DAY

## 2022-10-28 PROCEDURE — 36415 COLL VENOUS BLD VENIPUNCTURE: CPT

## 2022-10-28 PROCEDURE — 94640 AIRWAY INHALATION TREATMENT: CPT

## 2022-10-28 RX ORDER — ATORVASTATIN CALCIUM 40 MG/1
40 TABLET, FILM COATED ORAL NIGHTLY
Qty: 30 TABLET | Refills: 3 | DISCHARGE
Start: 2022-10-28

## 2022-10-28 RX ORDER — CARBOXYMETHYLCELLULOSE SODIUM 10 MG/ML
1 GEL OPHTHALMIC EVERY 4 HOURS PRN
DISCHARGE
Start: 2022-10-28

## 2022-10-28 RX ORDER — LIDOCAINE 50 MG/G
OINTMENT TOPICAL
DISCHARGE
Start: 2022-10-28

## 2022-10-28 RX ORDER — POLYVINYL ALCOHOL 14 MG/ML
1 SOLUTION/ DROPS OPHTHALMIC EVERY 4 HOURS PRN
Status: DISCONTINUED | OUTPATIENT
Start: 2022-10-28 | End: 2022-10-28

## 2022-10-28 RX ORDER — CARBOXYMETHYLCELLULOSE SODIUM 10 MG/ML
1 GEL OPHTHALMIC EVERY 4 HOURS PRN
Status: DISCONTINUED | OUTPATIENT
Start: 2022-10-28 | End: 2022-10-28 | Stop reason: HOSPADM

## 2022-10-28 RX ORDER — LIDOCAINE 50 MG/G
OINTMENT TOPICAL PRN
Status: DISCONTINUED | OUTPATIENT
Start: 2022-10-28 | End: 2022-10-28 | Stop reason: HOSPADM

## 2022-10-28 RX ADMIN — ALBUTEROL SULFATE 2.5 MG: 2.5 SOLUTION RESPIRATORY (INHALATION) at 16:24

## 2022-10-28 RX ADMIN — HYDROCORTISONE ACETATE: 1 CREAM TOPICAL at 08:01

## 2022-10-28 RX ADMIN — DIPHENHYDRAMINE HCL 25 MG: 25 TABLET ORAL at 03:44

## 2022-10-28 RX ADMIN — APIXABAN 2.5 MG: 2.5 TABLET, FILM COATED ORAL at 07:57

## 2022-10-28 RX ADMIN — CYANOCOBALAMIN TAB 500 MCG 500 MCG: 500 TAB at 07:57

## 2022-10-28 RX ADMIN — ACETAMINOPHEN 650 MG: 325 TABLET ORAL at 03:44

## 2022-10-28 RX ADMIN — SODIUM CHLORIDE, PRESERVATIVE FREE 10 ML: 5 INJECTION INTRAVENOUS at 07:59

## 2022-10-28 RX ADMIN — LIDOCAINE: 50 OINTMENT TOPICAL at 15:01

## 2022-10-28 RX ADMIN — ALBUTEROL SULFATE 2.5 MG: 2.5 SOLUTION RESPIRATORY (INHALATION) at 11:43

## 2022-10-28 RX ADMIN — METOPROLOL TARTRATE 25 MG: 25 TABLET, FILM COATED ORAL at 07:57

## 2022-10-28 RX ADMIN — PANTOPRAZOLE SODIUM 40 MG: 40 TABLET, DELAYED RELEASE ORAL at 06:02

## 2022-10-28 RX ADMIN — SERTRALINE HYDROCHLORIDE 50 MG: 50 TABLET ORAL at 07:57

## 2022-10-28 RX ADMIN — ASPIRIN 81 MG 81 MG: 81 TABLET ORAL at 07:57

## 2022-10-28 RX ADMIN — ACETAMINOPHEN 650 MG: 325 TABLET ORAL at 08:23

## 2022-10-28 RX ADMIN — ALBUTEROL SULFATE 2.5 MG: 2.5 SOLUTION RESPIRATORY (INHALATION) at 08:11

## 2022-10-28 ASSESSMENT — PAIN SCALES - GENERAL
PAINLEVEL_OUTOF10: 0
PAINLEVEL_OUTOF10: 5
PAINLEVEL_OUTOF10: 0
PAINLEVEL_OUTOF10: 10
PAINLEVEL_OUTOF10: 0

## 2022-10-28 ASSESSMENT — PAIN DESCRIPTION - LOCATION
LOCATION: RECTUM
LOCATION: BUTTOCKS

## 2022-10-28 ASSESSMENT — PAIN DESCRIPTION - ORIENTATION: ORIENTATION: MID

## 2022-10-28 ASSESSMENT — PAIN DESCRIPTION - DESCRIPTORS: DESCRIPTORS: DISCOMFORT;STABBING

## 2022-10-28 NOTE — PROGRESS NOTES
10/27/22 2104   Patient Observation   SpO2 100 %   Ventilator Settings   FiO2  40 %   Surgical Airway (Trach)   No placement date or time found. Surgical Airway Type: Tracheostomy  Comments: PATIENT HAS A CUFFED 6 PROXIMAL XLT TRACH CUFF IS DEFLATED   Status Secured   Site Assessment Dry   Site Care Dressing applied   Ties Assessment Clean;Dry; Intact   Cuff Pressure   (deflated with the PMV in place)   Treatment   $Treatment Type $Inhaled Therapy/Meds

## 2022-10-28 NOTE — DISCHARGE INSTR - COC
Continuity of Care Form    Patient Name: Jose Guadalupe Sims   :  [id number]  MRN:  [id number]    [address] [place] [address] date:  10/26/2022  Discharge date:  10/28/22      Code Status Order: Full Code   Advance Directives:     Admitting Physician:  Michelle Paez MD  PCP: Aisha Hernandez MD    Discharging Nurse: Annika Calvillo [address] [place] [address] Unit/Room#: 0207/0207-01  Discharging Unit Phone Number: [phone]    Emergency Contact:   Extended Emergency Contact Information  Primary Emergency Contact: [address] Phone: [phone]  Relation: Child    Past Surgical History:  Past Surgical History:   Procedure Laterality Date    COLECTOMY N/A 5/10/2022    EXPLORATORY LAPAROTOMY, LYSIS OF ADHESIONS performed by Jazmyne Javier MD at [facility]       Immunization History: There is no immunization history on file for this patient.     Active Problems:  Patient Active Problem List   Diagnosis Code    Unresponsive episode R41.89    Syncope R55    Acute respiratory failure with hypoxia and hypercapnia (HCC) J96.01, J96.02    Pneumonia due to infectious organism J18.9    ESRD on dialysis ([facility] N18.6, Z99.2    Chronic respiratory failure requiring continuous mechanical ventilation through tracheostomy ([facility] [facility] J96.10, Z93.0, Z99.11    Acute on chronic respiratory failure with hypoxia (HCC) J96.21    Acute pulmonary edema (HCC) J81.0    Rectal pain K62.89    History of CVA (cerebrovascular accident) Z86.73    HCAP (healthcare-associated pneumonia) J18.9    Anemia, chronic disease D63.8    Anxiety F41.9    Chronic diastolic heart failure (HCC) I50.32    Dysphagia, pharyngeal phase R13.13    Primary hypertension I10    GERD (gastroesophageal reflux disease) K21.9    Morbid obesity with BMI of 50.0-59.9, adult ([facility] E66.01, Z68.43    SILVER (obstructive sleep apnea) G47.33    Abnormal chest x-ray R93.89    Vaginal bleeding N93.9    SBO (small bowel obstruction) ([facility] K56.609    Fecal impaction ([facility] K56.41    Chest pain R07.9    Coronary artery disease involving native heart with unstable angina pectoris (HCC) I25.110    Mixed hyperlipidemia E78.2    End stage renal disease (HCC) N18.6    Tracheostomy status ([facility] [address] Z93.0       Isolation/Infection:   Isolation            Contact  Droplet Plus          Patient Infection Status       Infection Onset Added Last Indicated Last Indicated By Review Planned Expiration Resolved Resolved By    Tasia auris 05/26/22 10/27/22 10/27/22 Stephani Dai RN        Added from external infection. MDRO (multi-drug resistant organism) 22 Culture, Respiratory        MRSA 21 Elizabeth Cueto RN        Added from external infection. Resolved    COVID-19 (Rule Out) 10/25/22 10/25/22 10/26/22 COVID-19, Rapid (Ordered)   10/26/22 Rule-Out Test Resulted    COVID-19 (Rule Out) 22 COVID-19, Rapid (Ordered)   22 Rule-Out Test Resulted    COVID-19 (Rule Out) 09/15/22 09/15/22 09/15/22 COVID-19, Rapid (Ordered)   09/15/22 Rule-Out Test Resulted    COVID-19 (Rule Out) 22 COVID-19 & Influenza Combo (Ordered)   22 Srikanth Krishnamurthy RN    COVID-19 (Rule Out) 22 COVID-19, Rapid (Ordered)   22 Rule-Out Test Resulted    C-diff Rule Out 22 Clostridium difficile toxin/antigen (Ordered)   22 Elizabeth Cueto RN    Order .     COVID-19 (Rule Out) 22 COVID-19 & Influenza Combo (Ordered)   22 Rule-Out Test Resulted    COVID-19 (Rule Out) 01/10/22 01/10/22 01/11/22 COVID-19 & Influenza Combo (Ordered)   22 Rule-Out Test Resulted            Nurse Assessment:  Last Vital Signs: BP (!) 135/50   Pulse 65   Temp 97.6 °F (36.4 °C) (Oral)   Resp 18   Ht 5' 4\" (1.626 m)   Wt 278 lb 7.1 oz (126.3 kg)   SpO2 98%   BMI 47.79 kg/m²     Last documented pain score (0-10 scale): Pain Level: 10  Last Weight:   Wt Readings from Last 1 Encounters:   10/28/22 278 lb 7.1 oz (126.3 kg)     Mental Status:  alert, coherent, logical, thought processes intact, and able to concentrate and follow conversation    IV Access:  - HD fistula RUE    Nursing Mobility/ADLs:  Walking   Dependent  Transfer  Dependent  Bathing  Assisted  Dressing  Assisted  Toileting  Dependent  Feeding  Independent  Med [address] Delivery   whole    Wound Care Documentation and Therapy:  Negative Pressure Wound Therapy Abdomen Medial (Active)   Number of days: 170       Wound 04/26/22 Thigh Proximal;Right;Posterior (Active)   Number of days: 184       Wound 05/08/22 Thigh Anterior;Right;Medial wound upper inner rigth thigh (Active)   Number of days: 172       Incision 05/10/22 Abdomen Medial (Active)   Number of days: 170        Elimination:  Continence: Bowel: No  Bladder: No  Urinary Catheter: None   Colostomy/Ileostomy/Ileal Conduit: No       Date of Last BM: 10/28/22    Intake/Output Summary (Last 24 hours) at 10/28/2022 1153  Last data filed at 10/28/2022 0841  Gross per 24 hour   Intake 640 ml   Output 4050 ml   Net -3410 ml     I/O last 3 completed shifts: In: 36 [P.O.:360]  Out: 4050     Safety Concerns: At Risk for Falls    Impairments/Disabilities:      None    Nutrition Therapy:  Current Nutrition Therapy:   - Oral Diet:  Carb Control 5 carbs/meal (2000kcals/day), Cardiac, and Renal    Routes of Feeding: Oral  Liquids:  Thin Liquids  Daily Fluid Restriction: yes - amount 1800mL  Last Modified Barium Swallow with Video (Video Swallowing Test): not done    Treatments at the Time of Hospital Discharge:   Respiratory Treatments: Albuterol nebulizer solution 2.5 mg every four hours while awake  Oxygen Therapy:   28% via trach mask  Ventilator:    - No ventilator support    Rehab Therapies: Physical Therapy and Occupational Therapy  Weight Bearing Status/Restrictions: No weight bearing restrictions  Other Medical Equipment (for information only, NOT a DME order):  wheelchair and hospital bed  Other Treatments: n/a    Patient's personal belongings (please select all that are sent with patient): All belongings sent with pt    RN SIGNATURE:  Electronically signed by Yumiko Muller RN on 10/28/22 at 2:29 PM EDT    CASE MANAGEMENT/SOCIAL WORK SECTION    Inpatient Status Date:     Readmission Risk Assessment Score:  Readmission Risk              Risk of Unplanned Readmission:  0           Discharging to Facility/ Agency   Name: [facility]   Address:  Phone: [phone]  Fax:    Dialysis Facility (if applicable)   Name:  Address:  Dialysis Schedule:  Phone:  Fax:    / signature: Electronically signed by Arti Breaux RN on 10/28/22 at 11:54 AM EDT    PHYSICIAN SECTION    Prognosis: Fair    Condition at Discharge: Stable    Rehab Potential (if transferring to Rehab): Fair    Recommended Labs or Other Treatments After Discharge: Follow up with Latoya Chow MD 1 week    Labs on HD day per nephrology    Physician Certification: I certify the above information and transfer of Emanuel Aschoff  is necessary for the continuing treatment of the diagnosis listed and that she requires Intermediate Nursing Care for greater 30 days.      Update Admission H&P: No change in H&P    PHYSICIAN SIGNATURE:  Electronically signed by Mario Anne MD on 10/28/22 at 12:36 PM EDT

## 2022-10-28 NOTE — PROGRESS NOTES
120 kg. Patient had a shorter dialysis run on Saturday. She has had history of cutting the treatment short intermittently at the outpatient dialysis unit      Physical exam:   Constitutional:  VITALS:  BP (!) 135/50   Pulse 70   Temp 97.6 °F (36.4 °C) (Oral)   Resp 20   Ht 5' 4\" (1.626 m)   Wt 278 lb 7.1 oz (126.3 kg)   SpO2 100%   BMI 47.79 kg/m²   Gen: alert, awake  Neck: No JVD  Skin: Unremarkable  Cardiovascular:  S1, S2 without m/r/g   Respiratory: CTA B without w/r/r; respiratory effort normal  Abdomen:  soft, nt, nd,   Extremities: 1+ thigh lower extremity edema and in pannus  Neuro/Psy: AAoriented times 3 ; moves all 4 ext      Data/  Recent Labs     10/25/22  1451 10/27/22  0534   WBC 5.7 7.0   HGB 10.8* 10.3*   HCT 34.8* 33.3*   MCV 96.2 98.3    141       Recent Labs     10/25/22  1451 10/25/22  1917 10/27/22  0935 10/28/22  0507     --  136 136   K 5.0  --  5.0 4.5   CL 96*  --  99 99   CO2 31  --  27 28   GLUCOSE 87  --  86 104*   PHOS  --  2.8 5.0* 4.6   MG  --  1.50*  --   --    BUN 39*  --  59* 43*   CREATININE 4.7*  --  6.6* 4.7*   LABGLOM 9*  --  6* 9*          Summary of echo from 10/27/22   Limited only f/u for LVEF. LV systolic function is mildly reduced with a visually estimated EF of    45-50%. No obvious segmental wall motion abnormality. Assessment  -ESRD on HD Tuesday Thursday Saturday at [facility]   Target weight listed as 120 kg  -Chest pain with new T wave inversions noted on EKG, per cardiology   Stress test done in September 2022 showed possible inferobasal hypokinesis   Echo noted as above   -Anemia, at goal  -Hypomagnesemia  -Chronic respiratory failure status post trach  -CKD/MBD    Plan  -Next HD on 10/29 per schedule, will try ultrafiltration as tolerated up to 3-4 kg , using midodrine 10 mg    Counseled her to stay for 3.5 h and/or plan for 3 hours 4 times a week.   Patient reports that she will think about this  -Renal dose medications    Thank you for the consultation. Please do not hesitate to call with questions. José Miguel Givens MD  Office: [phone]  Fax:    [phone]  [facility]. [facility]

## 2022-10-28 NOTE — PROGRESS NOTES
10/28/22 1144   Patient Observation   Heart Rate 65   Resp 18   SpO2 98 %   Ventilator Settings   FiO2  28 %   Surgical Airway (Trach)   No placement date or time found.    Surgical Airway Type: Tracheostomy  Comments: PATIENT HAS A CUFFED 6 PROXIMAL XLT TRACH CUFF IS DEFLATED   Status Speaking valve;Secured   Site Assessment Clean;Dry   Ties Assessment Secure   Spare Trach at Bedside Yes   Ambu Bag With Mask at Bedside Yes   Treatment   $Treatment Type $Inhaled Therapy/Meds

## 2022-10-28 NOTE — PROGRESS NOTES
Hospitalist Progress Note      PCP: Nahum Mata MD    Date of Admission: 10/26/2022    Chief Complaint: Chest pain    Hospital Course:   71 y.o. female who presented to [facility] with chest pain. Patient states she has had intermittent chest pain over past couple of weeks. No shortness of breath. No palpitations. Patient has chronic trach. No fever or chills. Subjective:   Patient on hemodialysis this morning. Patient was requesting to come off dialysis early. Patient also was concerned about low blood sugar and wanted to drink orange juice. Patient was educated by nurse about low potassium foods and drinks. Patient states she still has intermittent chest pain.       Medications:  Reviewed    Infusion Medications    sodium chloride      dextrose       Scheduled Medications    midodrine  10 mg Oral Once per day on Tue Thu Sat    midodrine        sodium chloride flush  5-40 mL IntraVENous 2 times per day    aspirin  81 mg Oral Daily    atorvastatin  40 mg Oral Nightly    apixaban  2.5 mg Oral BID    metoprolol tartrate  25 mg Oral BID    pantoprazole  40 mg Oral QAM AC    sertraline  50 mg Oral Daily    traZODone  50 mg Oral Nightly    vitamin B-12  500 mcg Oral Daily    hydrocortisone   Topical BID    insulin lispro  0-4 Units SubCUTAneous TID WC    insulin lispro  0-4 Units SubCUTAneous Nightly    albuterol  2.5 mg Nebulization Q4H WA     PRN Meds: loperamide, sodium chloride flush, sodium chloride, ondansetron, acetaminophen **OR** acetaminophen, perflutren lipid microspheres, polyethylene glycol, promethazine, perflutren lipid microspheres, glucose, dextrose bolus **OR** dextrose bolus, glucagon (rDNA), dextrose, diphenhydrAMINE      Intake/Output Summary (Last 24 hours) at 10/27/2022 2034  Last data filed at 10/27/2022 1315  Gross per 24 hour   Intake 400 ml   Output 4050 ml   Net -3650 ml       Physical Exam Performed:    BP (!) 142/77   Pulse 90   Temp 98.4 °F (36.9 °C) (Oral) Resp 20   Ht 5' 4\" (1.626 m)   Wt 281 lb 4.9 oz (127.6 kg)   SpO2 100%   BMI 48.29 kg/m²     General appearance:  No apparent distress, appears stated age and cooperative. HEENT:  Trach with PMV. Neck: Supple, with full range of motion. No jugular venous distention. Trachea midline. Respiratory:  Normal respiratory effort. Clear to auscultation, bilaterally without Rales/Wheezes/Rhonchi. Decrease in base  Cardiovascular:  Regular rate and rhythm with normal S1/S2 without murmurs, rubs or gallops. Abdomen: Soft, non-tender, non-distended with normal bowel sounds. Obese  Musculoskeletal:  No clubbing, cyanosis edema bilaterally. Full range of motion without deformity. Edema right arm  Skin: Skin color, texture, turgor normal.  No rashes or lesions. Neurologic:  Neurovascularly intact without any focal sensory/motor deficits. Cranial nerves: II-XII intact, grossly non-focal.  Psychiatric:  Alert and oriented, thought content appropriate, normal insight  Capillary Refill: Brisk,3 seconds, normal  Peripheral Pulses: +2 palpable, equal bilaterally          Labs:   Recent Labs     10/25/22  1451 10/27/22  0534   WBC 5.7 7.0   HGB 10.8* 10.3*   HCT 34.8* 33.3*    141     Recent Labs     10/25/22  1451 10/25/22  1917 10/27/22  0935     --  136   K 5.0  --  5.0   CL 96*  --  99   CO2 31  --  27   BUN 39*  --  59*   CREATININE 4.7*  --  6.6*   CALCIUM 9.0  --  8.9   PHOS  --  2.8 5.0*     Recent Labs     10/25/22  1451   AST 18   ALT 8*   BILITOT 0.4   ALKPHOS 114     No results for input(s): INR in the last 72 hours.   Recent Labs     10/25/22  1917 10/26/22  1118 10/26/22  1338   TROPONINI 0.18* 0.21* 0.22*       Urinalysis:    No results found for: Nuria Pinch, BACTERIA, RBCUA, BLOODU, SPECGRAV, GLUCOSEU    Radiology:  No orders to display           Assessment/Plan:    Active Hospital Problems    Diagnosis     [facility] status ([facility] [Z93.0]      Priority: Medium    Chest pain [R07.9] Priority: Medium       Atypical chest pain. Appreciate cardiology evaluation. No plans for further ischemic work-up. EF 45 to 50%. . No ischemic work up planned. Trend troponins. On ASA and statin. ESRD chronic. Nephrology following. Continue HD T-TH-Sat  Atrial fibrillation. Continue Eliquis. Continue rate control. DMII. Continue to monitor blood sugar. Continue bolus correction. Morbid Obesity -  With Body mass index is 49.19 kg/m². Complicating assessment and treatment. Placing patient at risk for multiple co-morbidities as well as early death and contributing to the patient's presentation. Counseled on weight loss. DVT Prophylaxis: Eliquis  Diet: ADULT DIET; Regular; 5 carb choices (75 gm/meal); Low Fat/Low Chol/High Fiber/ASHANTI; Low Potassium (Less than 3000 mg/day); Low Phosphorus (Less than 1000 mg); 1800 ml  Code Status: Full Code  PT/OT Eval Status: Following    Dispo -plan for discharge to SNF possibly in a.m.     Appropriate for A1 Discharge Unit: Yes      Lupillo Hernandez MD

## 2022-10-28 NOTE — PROGRESS NOTES
Pt refusing to be turned on the R side. Just wants either supine or L side. Educated about the importance of turning to prevent sores but said she's hurting if turned on the R side.

## 2022-10-28 NOTE — DISCHARGE SUMMARY
Hospital Medicine Discharge Summary    Patient ID: Veronica Lipscomb      Patient's PCP: Michael Dawkins MD    Admit Date: 10/26/2022     Discharge Date:   10/28/2022    Admitting Provider: Sandy Castro MD     Discharge Provider: Jet Kumar MD     Discharge Diagnoses: Active Hospital Problems    Diagnosis     Tracheostomy status ([facility] [Z93.0]      Priority: Medium    Chest pain [R07.9]      Priority: Medium       The patient was seen and examined on day of discharge and this discharge summary is in conjunction with any daily progress note from day of discharge. Hospital Course:   71 y.o. female who presented to [facility] with chest pain. Patient states she has had intermittent chest pain over past couple of weeks. No shortness of breath. No palpitations. Patient has chronic trach. No fever or chills. Patient treated for:  Atypical chest pain. Appreciate cardiology evaluation. No plans for further ischemic work-up. EF 45 to 50%. . No ischemic work up planned. Chest pain appeared to be more chronic in nature. ESRD chronic. Nephrology following. Continue HD T-TH-Sat  Atrial fibrillation. Continue Eliquis. Continue rate control. DMII. Continue to monitor blood sugar. Continue bolus correction. Morbid Obesity -  With Body mass index is 49.19 kg/m². Complicating assessment and treatment. Placing patient at risk for multiple co-morbidities as well as early death and contributing to the patient's presentation. Counseled on weight loss. Hemorrhoid pain. Anusol cream and lidocaine ointment ordered. Subconjunctival hemorrhage right eye. Patient was coughing. We will continue lubricating drops.     Patient stable for transfer to long-term care      Physical Exam Performed:     BP (!) 135/50   Pulse 65   Temp 97.6 °F (36.4 °C) (Oral)   Resp 18   Ht 5' 4\" (1.626 m)   Wt 278 lb 7.1 oz (126.3 kg)   SpO2 98%   BMI 47.79 kg/m²       General appearance:  No apparent distress, appears stated age and cooperative. HEENT:  Trach with PMV. Neck: Supple, with full range of motion. No jugular venous distention. Trachea midline. Respiratory:  Normal respiratory effort. Clear to auscultation, bilaterally without Rales/Wheezes/Rhonchi. Decrease in base  Cardiovascular:  Regular rate and rhythm with normal S1/S2 without murmurs, rubs or gallops. Abdomen: Soft, non-tender, non-distended with normal bowel sounds. Obese  Musculoskeletal:  No clubbing, cyanosis edema bilaterally. Full range of motion without deformity. Edema right arm  Skin: Skin color, texture, turgor normal.  No rashes or lesions. Neurologic:  Neurovascularly intact without any focal sensory/motor deficits. Cranial nerves: II-XII intact, grossly non-focal.  Psychiatric:  Alert and oriented, thought content appropriate, normal insight  Capillary Refill: Brisk,3 seconds, normal  Peripheral Pulses: +2 palpable, equal bilaterally          Labs:  For convenience and continuity at follow-up the following most recent labs are provided:      CBC:    Lab Results   Component Value Date/Time    WBC 7.0 10/27/2022 05:34 AM    HGB 10.3 10/27/2022 05:34 AM    HCT 33.3 10/27/2022 05:34 AM     10/27/2022 05:34 AM       Renal:    Lab Results   Component Value Date/Time     10/28/2022 05:07 AM    K 4.5 10/28/2022 05:07 AM    K 5.0 10/25/2022 02:51 PM    CL 99 10/28/2022 05:07 AM    CO2 28 10/28/2022 05:07 AM    BUN 43 10/28/2022 05:07 AM    CREATININE 4.7 10/28/2022 05:07 AM    CALCIUM 8.8 10/28/2022 05:07 AM    PHOS 4.6 10/28/2022 05:07 AM         Significant Diagnostic Studies    Radiology:   No orders to display          Consults:     IP CONSULT TO CARDIOLOGY  IP CONSULT TO NEPHROLOGY  IP CONSULT TO SOCIAL WORK    Disposition: ECF    Condition at Discharge: Stable    Discharge Instructions/Follow-up: Follow with primary care 1 to 2 weeks    Code Status:  Full Code    Activity: activity as tolerated    Diet: Resume previous home diet      Discharge Medications:     Current Discharge Medication List             Details   carboxymethylcellulose PF (THERATEARS) 1 % GEL ophthalmic gel Place 1 drop into both eyes every 4 hours as needed for Dry Eyes      lidocaine (XYLOCAINE) 5 % ointment Apply topically as needed. Details   atorvastatin (LIPITOR) 40 MG tablet Take 1 tablet by mouth nightly  Qty: 30 tablet, Refills: 3                Details   apixaban (ELIQUIS) 2.5 MG TABS tablet Take 2.5 mg by mouth 2 times daily      loperamide (IMODIUM) 2 MG capsule Take 2 mg by mouth 4 times daily as needed for Diarrhea      promethazine (PHENERGAN) 12.5 MG tablet Take 12.5 mg by mouth every 6 hours as needed for Nausea      traZODone (DESYREL) 50 MG tablet Take 50 mg by mouth nightly      vitamin B-12 (CYANOCOBALAMIN) 500 MCG tablet Take 500 mcg by mouth daily      metoprolol tartrate (LOPRESSOR) 25 MG tablet Take 1 tablet by mouth 2 times daily  Qty: 60 tablet, Refills: 3      hydrocortisone (ANUSOL-HC) 2.5 % CREA rectal cream Place rectally 2 times daily  Qty: 1 each, Refills: 0      calcium acetate 667 MG TABS Take 1,334 mg by mouth 3 times daily (with meals)      hydrocortisone 1 % cream Apply topically 2 times daily Apply topically 2 times daily.       acetaminophen (TYLENOL) 325 MG tablet Take 650 mg by mouth every 6 hours as needed for Pain      diphenhydrAMINE (BENADRYL) 25 MG capsule Take 25 mg by mouth every 8 hours as needed       polyethylene glycol (GLYCOLAX) 17 g packet Take 17 g by mouth daily as needed for Constipation      ipratropium-albuterol (DUONEB) 0.5-2.5 (3) MG/3ML SOLN nebulizer solution Inhale 1 vial into the lungs every 4 hours      midodrine (PROAMATINE) 5 MG tablet Take 10 mg by mouth three times a week Tues thurs sat pre dialysis      omeprazole (PRILOSEC) 20 MG delayed release capsule Take 20 mg by mouth daily      chlorhexidine (PERIDEX) 0.12 % solution Take 15 mLs by mouth 2 times daily sertraline (ZOLOFT) 50 MG tablet Take 50 mg by mouth daily             Time Spent on discharge is more than 35 minutes in the examination, evaluation, counseling and review of medications and discharge plan. Signed:    Pedro Bone MD   10/28/2022      Thank you Nahum Mata MD for the opportunity to be involved in this patient's care. If you have any questions or concerns, please feel free to contact me at [phone].

## 2022-10-28 NOTE — PROGRESS NOTES
10/28/22 0811   Patient Observation   Heart Rate 76   Resp 22   SpO2 99 %   Ventilator Settings   FiO2  28 %   Surgical Airway (Trach)   No placement date or time found.    Surgical Airway Type: Tracheostomy  Comments: PATIENT HAS A CUFFED 6 PROXIMAL XLT TRACH CUFF IS DEFLATED   Status Secured   Site Assessment Clean;Dry   Inner Cannula Care Changed/new   Ties Assessment Secure   Cuff Pressure   (DRFLATED PMV ON)   Spare Trach at Bedside Yes   Ambu Bag With Mask at Bedside Yes   Treatment   $Treatment Type $Inhaled Therapy/Meds

## 2022-10-28 NOTE — CARE COORDINATION
CASE MANAGEMENT DISCHARGE SUMMARY      Discharge to: [address] completed: n/ a  Hospital Exemption Notification (HENS) completed: n/a      Transportation: ambulance   Medical Transport explained to pt/family. Pt/family voice no agency preference. Agency used: [facility] up time: pm   Ambulance form completed: Yes    Confirmed discharge plan with: patient/daughter/RN/Sachi with [facility]     Patient: yes     Family:  yes    [facility]haval [facility]     Facility/Agency, name:  [facility] faxed   Phone number for report to facility: [phone]     RN, name: Seema Carolina    Note: Discharging nurse to complete [facility], reconcile [facility], and place final copy with patient's discharge packet. RN to ensure that written prescriptions for  Level II medications are sent with patient to the facility as per protocol.

## 2022-10-28 NOTE — PROGRESS NOTES
Patient transferred to room 207 from [facility]. Patient oriented to room, call light, bed rails, phone, lights and bathroom. Patient instructed about the schedule of the day including: vital sign frequency, lab draws, possible tests, frequency of MD and staff rounds, including RN/MD rounding together at bedside, daily weights, and I &O's. Patient instructed about prescribed diet, how to use 8MENU, and television. bed alarm in place, patient aware of placement and reason. Telemetry box in place, patient aware of placement and reason. [facility] notified of transfer. Bed locked, in lowest position, side rails up 2/4, call light within reach. Will continue to monitor.          Rey Villegas RN

## 2022-11-10 PROBLEM — Z99.2 ESRD ON HEMODIALYSIS (HCC): Status: ACTIVE | Noted: 2022-09-19

## 2022-12-31 ENCOUNTER — HOSPITAL ENCOUNTER (EMERGENCY)
Age: 72
Discharge: HOME | End: 2022-12-31
Payer: COMMERCIAL

## 2022-12-31 VITALS
SYSTOLIC BLOOD PRESSURE: 137 MMHG | OXYGEN SATURATION: 93 % | RESPIRATION RATE: 19 BRPM | TEMPERATURE: 98.6 F | HEART RATE: 72 BPM | DIASTOLIC BLOOD PRESSURE: 77 MMHG

## 2022-12-31 DIAGNOSIS — M79.89 SWELLING OF RIGHT UPPER EXTREMITY: Primary | ICD-10-CM

## 2022-12-31 DIAGNOSIS — Z79.01 ANTICOAGULATED: ICD-10-CM

## 2022-12-31 DIAGNOSIS — Z99.2 DIALYSIS PATIENT (HCC): ICD-10-CM

## 2022-12-31 PROCEDURE — 99283 EMERGENCY DEPT VISIT LOW MDM: CPT

## 2022-12-31 RX ORDER — SODIUM PHOSPHATE, DIBASIC AND SODIUM PHOSPHATE, MONOBASIC 7; 19 G/133ML; G/133ML
1 ENEMA RECTAL
COMMUNITY

## 2022-12-31 RX ORDER — OXYCODONE HYDROCHLORIDE 5 MG/1
5 TABLET ORAL 2 TIMES DAILY
COMMUNITY

## 2022-12-31 RX ORDER — LORAZEPAM 0.5 MG/1
0.5 TABLET ORAL EVERY 12 HOURS PRN
COMMUNITY

## 2022-12-31 ASSESSMENT — PAIN DESCRIPTION - LOCATION: LOCATION: FOOT

## 2022-12-31 ASSESSMENT — PAIN - FUNCTIONAL ASSESSMENT: PAIN_FUNCTIONAL_ASSESSMENT: NONE - DENIES PAIN

## 2022-12-31 ASSESSMENT — PAIN DESCRIPTION - ORIENTATION: ORIENTATION: RIGHT;LEFT

## 2023-01-01 NOTE — ED NOTES
1830-Notified by RN that patient needs transport set up back to nursing facility. Transport companies called include. 1831-[facility] no availability until 2300.  1832-[facility] no availability. 1833-[facility] No Availability. 1839-[facility] No answer. 1840-[facility] No Availability. 1842-[facility] No Availability. 1843-[facility] No Availability. 1847-Call back to [facility] booked transport for 2300.       Jae Prater  12/31/22 1929

## 2023-01-04 ASSESSMENT — ENCOUNTER SYMPTOMS
COLOR CHANGE: 0
ABDOMINAL PAIN: 0
FACIAL SWELLING: 0
SHORTNESS OF BREATH: 0
SORE THROAT: 0
RHINORRHEA: 0

## 2023-01-05 NOTE — ED PROVIDER NOTES
[address] ED  EMERGENCY DEPARTMENT ENCOUNTER      I am the Primary Clinician of Record    Note started: 8:50 PM EST 1/4/23    LAURA. I have evaluated this patient. My supervising physician was available for consultation. Pt Name: Viridiana Sanders  MRN: [id number]  [place] 1950  [address] evaluation: 12/31/2022  Provider: Mango Perkins, APRN - CNP  PCP: Pedro Araiza MD  ED Attending: No att. providers found      [address] [place] [address]       Chief Complaint   Patient presents with    Arm Swelling     Reports right upper arm swelling at dialysis site x \"few days\". HISTORY OF PRESENTILLNESS   (Location/Symptom, Timing/Onset, Context/Setting, Quality, Duration, Modifying Factors, Severity)  Note limiting factors. Viridiana Sanders is a 71 y.o. female for right arm swelling. Onset was for a while. Context includes patient states that her right arm has been swollen for a while. Patient is a resident of a nursing home and was sent over after the staff noted that her arm has been swollen. Patient states she receives dialysis to the right side. She reports that her right arm has been swollen \"for a while \". Patient states that this is nothing new. She denies chest pain or shortness of breath. Patient is already on Eliquis. Alleviating factors include nothing. Aggravating factors include nothing. Pain is 0/10. Nothing has been used for pain today. Nursing Notes were all reviewed and agreed with or any disagreements were addressed  in the HPI. REVIEW OF SYSTEMS       Review of Systems   Constitutional:  Negative for activity change, appetite change and fever. HENT:  Negative for congestion, facial swelling, rhinorrhea and sore throat. Eyes:  Negative for visual disturbance. Respiratory:  Negative for shortness of breath. Cardiovascular:  Negative for chest pain. Gastrointestinal:  Negative for abdominal pain.    Genitourinary:  Negative for difficulty urinating. Musculoskeletal:  Negative for arthralgias and myalgias. Right arm swelling   Skin:  Negative for color change and rash. Neurological:  Negative for dizziness and light-headedness. Psychiatric/Behavioral:  Negative for agitation. All other systems reviewed and are negative. Positives and Pertinent negatives as per HPI. Except as noted above in the ROS, all other systems were reviewed and negative.        PAST MEDICAL HISTORY     Past Medical History:   Diagnosis Date    Acute and chronic respiratory failure (acute-on-chronic) ([facility])     Acute blood loss anemia 7/1/2020    Acute deep vein thrombosis (DVT) of brachial vein of left upper extremity ([address] 2/20/2019    Formatting of this note might be different from the original. Dx February 2019    Anxiety disorder     Atherosclerotic heart disease of native coronary artery without angina pectoris     Bleeding hemorrhoids 6/29/2020    Cerebellar infarct ([address] 9/29/2019    Cerebral infarction ([facility])     Chronic pain syndrome     Dependence on renal dialysis ([address]     Dependence on respirator ([facility])     End stage renal disease ([address]     Gastro-esophageal reflux disease with esophagitis, without bleeding     Insomnia     Major depressive disorder, recurrent ([address]     Morbid obesity due to excess calories ([facility])     Muscle weakness     Obstructive sleep apnea (adult) (pediatric)     Other hyperlipidemia     Other voice and resonance disorders     Personal history of COVID-19     Pulmonary hypertension ([address]     Tracheostomy status ([address]     Type 2 diabetes mellitus without complications ([address]     Unspecified atrial fibrillation ([address]          SURGICAL HISTORY       Past Surgical History:   Procedure Laterality Date    COLECTOMY N/A 5/10/2022    EXPLORATORY LAPAROTOMY, LYSIS OF ADHESIONS performed by Kishan Cox MD at [address] [place] [address] [address]       Discharge Medication List as of 12/31/2022  7:53 PM        CONTINUE these medications which have NOT CHANGED    Details   BISACODYL RE Place rectally daily as neededHistorical Med      Magnesium Hydroxide (DULCOLAX PO) Take 10 mg by mouth daily as neededHistorical Med      sodium phosphate (FLEET) 7-19 GM/118ML Place 1 enema rectally once as neededHistorical Med      LORazepam (ATIVAN) 0.5 MG tablet Take 0.5 mg by mouth every 12 hours as needed for Anxiety. Historical Med      oxyCODONE (ROXICODONE) 5 MG immediate release tablet Take 5 mg by mouth in the morning and at bedtime. Historical Med      UNABLE TO FIND Veltessa 8.4 gm by mouth one time a day every Monday, Wednesday, Friday, Sunday for hyperkalemiaHistorical Med      atorvastatin (LIPITOR) 40 MG tablet Take 1 tablet by mouth nightly, Disp-30 tablet, R-3DC to SNF      carboxymethylcellulose PF (THERATEARS) 1 % GEL ophthalmic gel Place 1 drop into both eyes every 4 hours as needed for Dry EyesDC to SNF      lidocaine (XYLOCAINE) 5 % ointment Apply topically as needed., DC to SNF      apixaban (ELIQUIS) 2.5 MG TABS tablet Take 2.5 mg by mouth 2 times dailyHistorical Med      loperamide (IMODIUM) 2 MG capsule Take 2 mg by mouth 4 times daily as needed for DiarrheaHistorical Med      promethazine (PHENERGAN) 12.5 MG tablet Take 12.5 mg by mouth every 6 hours as needed for NauseaHistorical Med      traZODone (DESYREL) 50 MG tablet Take 50 mg by mouth nightlyHistorical Med      vitamin B-12 (CYANOCOBALAMIN) 500 MCG tablet Take 500 mcg by mouth dailyHistorical Med      metoprolol tartrate (LOPRESSOR) 25 MG tablet Take 1 tablet by mouth 2 times daily, Disp-60 tablet, R-3Print      hydrocortisone (ANUSOL-HC) 2.5 % CREA rectal cream Place rectally 2 times daily, Rectal, 2 TIMES DAILY Starting Thu 3/3/2022, Disp-1 each, R-0, Print      calcium acetate 667 MG TABS Take 3 capsules by mouth 3 times daily (with meals)Historical Med      hydrocortisone 1 % cream Apply topically 2 times daily Apply topically 2 times daily. , Topical, 2 TIMES DAILY, Historical Med      acetaminophen (TYLENOL) 325 MG tablet Take 650 mg by mouth every 6 hours as needed for PainHistorical Med      diphenhydrAMINE (BENADRYL) 25 MG capsule Take 25 mg by mouth every 8 hours as needed Historical Med      polyethylene glycol (GLYCOLAX) 17 g packet Take 17 g by mouth daily as needed for ConstipationHistorical Med      ipratropium-albuterol (DUONEB) 0.5-2.5 (3) MG/3ML SOLN nebulizer solution Inhale 1 vial into the lungs every 4 hoursHistorical Med      midodrine (PROAMATINE) 5 MG tablet Take 10 mg by mouth three times a week Tues thurs sat pre dialysisHistorical Med      omeprazole (PRILOSEC) 20 MG delayed release capsule Take 20 mg by mouth dailyHistorical Med      chlorhexidine (PERIDEX) 0.12 % solution Take 15 mLs by mouth 2 times dailyHistorical Med      sertraline (ZOLOFT) 50 MG tablet Take 50 mg by mouth dailyHistorical Med               ALLERGIES     Haloperidol lactate and Ziprasidone hcl      FAMILY HISTORY     No family history on file. SOCIAL HISTORY       Social History     Socioeconomic History    Marital status:    Tobacco Use    Smoking status: Former    Smokeless tobacco: Never   Substance and Sexual Activity    Alcohol use: Never    Drug use: Never    Sexual activity: Not Currently         SCREENINGS    Pau Coma Scale  Eye Opening: Spontaneous  Best Verbal Response: Oriented  Best Motor Response: Obeys commands  Pau Coma Scale Score: 15      CIWA Assessment  BP: 137/77  Heart Rate: 72          PHYSICAL EXAM     ED Triage Vitals [12/31/22 1708]   BP Temp Temp Source Heart Rate Resp SpO2 Height Weight   127/66 98.6 °F (37 °C) Oral 69 20 93 % -- --       Physical Exam  Constitutional:       Appearance: Normal appearance. She is well-developed. She is obese. HENT:      Head: Normocephalic and atraumatic. Cardiovascular:      Rate and Rhythm: Normal rate. Pulmonary:      Effort: Pulmonary effort is normal. No respiratory distress. Comments: Patient has a trach and trach collar at her baseline  Abdominal:      General: There is no distension. Palpations: Abdomen is soft. Musculoskeletal:         General: Swelling present. No tenderness, deformity or signs of injury. Cervical back: Normal range of motion. Comments: Right arm with diffuse swelling. Sensation and pulse intact on exam.  Fistula noted to the right upper arm with a positive bruit and thrill. Skin:     General: Skin is warm and dry. Neurological:      Mental Status: She is alert and oriented to person, place, and time. DIAGNOSTIC RESULTS   LABS:    Labs Reviewed - No data to display      All other labs were within normal range or not returned as of this dictation. EKG: All EKG's are interpreted by the Emergency Department Physician who either signs or Co-signs this chart in the absence of a cardiologist.  Please see their note for interpretation of EKG. RADIOLOGY:   Non plain film images ans such as CT, ultrasound, and MRI are read by the radiologist. Plain radiographic images are visualized and preliminarily interpreted by the ED Provider with the below findings:            Interpretation per the Radiologist below, if available at the time of this note:    VL Extremity Venous Right    (Results Pending)     No results found. No results found. No results found. PROCEDURES   Unless otherwise noted below, none     Procedures     CRITICAL CARE TIME   Unless otherwise noted below, none          EMERGENCYDEPARTMENT COURSE/MDM:     Vitals:    Vitals:    12/31/22 1708 12/31/22 1932   BP: 127/66 137/77   Pulse: 69 72   Resp: 20 19   Temp: 98.6 °F (37 °C)    TempSrc: Oral    SpO2: 93%          Patient was seen and evaluated by myself. Patient is here today for complaints of right arm swelling. Patient is a dialysis patient currently resides in a nursing home. Patient states that her right arm has been swollen for \"a while \".   She reports that this is nothing new. She denies chest pain or shortness of breath. On exam she is awake and alert hemodynamically stable nontoxic in appearance. She does have a diffuse a swollen right arm. She is neurovascular intact. I did inform the patient that she is currently on Eliquis which is the treatment for blood clot. This point we do not have availability to get an ultrasound. Patient at this time is elected to be discharged back to the nursing home to follow-up and have an outpatient ultrasound. She is encouraged to return to the ED for worsening symptoms and to follow-up with her primary care doctor in the next 2 days. Patient ultimately was discharged back to nursing home with all questions answered.       Patient was given the following medications:  Medications - No data to display         CONSULTS:  None      Discussion with other professionals - none    Social determinants - none    Records Reviewed - none    History from - patient    Limitations to history- none    Chronic conditions: has a past medical history of Acute and chronic respiratory failure (acute-on-chronic) ([facility]), Acute blood loss anemia (7/1/2020), Acute deep vein thrombosis (DVT) of brachial vein of left upper extremity ([address] (2/20/2019), Anxiety disorder, Atherosclerotic heart disease of native coronary artery without angina pectoris, Bleeding hemorrhoids (6/29/2020), Cerebellar infarct ([address] (9/29/2019), Cerebral infarction ([address] Chronic pain syndrome, Dependence on renal dialysis ([address] Dependence on respirator ([address] End stage renal disease ([address] Gastro-esophageal reflux disease with esophagitis, without bleeding, Insomnia, Major depressive disorder, recurrent ([address] Morbid obesity due to excess calories ([address] Muscle weakness, Obstructive sleep apnea (adult) (pediatric), Other hyperlipidemia, Other voice and resonance disorders, Personal history of COVID-19, Pulmonary hypertension ([address] Tracheostomy status ([address] Type 2 diabetes mellitus without complications ([facility] and Unspecified atrial fibrillation ([facility] Disposition considerations: Right upper arm DVT was considered however the patient is currently on Eliquis and this is not any problems. This has been a chronic issue. Is this patient to be included in the SEP-1 Core Measure due to severe sepsis or septic shock? No   Exclusion criteria - the patient is NOT to be included for SEP-1 Core Measure due to: Infection is not suspected         The patient tolerated their visit well. I have evaluated this patient. My supervising physician was available for consultation. The patient and / or the family were informed of the results of any tests, a time was given to answer questions, a plan was proposed and they agreed with plan. FINAL IMPRESSION      1. Swelling of right upper extremity    2. Anticoagulated    3. Dialysis patient [facility])          DISPOSITION/PLAN   DISPOSITION Decision To Discharge 12/31/2022 06:04:38 PM      PATIENT REFERRED TO:  No follow-up provider specified. DISCHARGE MEDICATIONS:  Discharge Medication List as of 12/31/2022  7:53 PM          DISCONTINUED MEDICATIONS:  Discharge Medication List as of 12/31/2022  7:53 PM        STOP taking these medications       warfarin (COUMADIN) 3 MG tablet Comments:   Reason for Stopping:         furosemide (LASIX) 20 MG tablet Comments:   Reason for Stopping:                      (Please note that portions of this note were completed with a voice recognition program.  Efforts were made to edit the dictations but occasionally words are mis-transcribed.)    Patient was seen during the time of the [place] pandemic. N95 and appropriate PPE was worn during the visit.       ITALO Renteria CNP (electronically signed)        ITALO Renteria CNP  01/04/23 2055

## 2023-01-19 ENCOUNTER — APPOINTMENT (OUTPATIENT)
Dept: GENERAL RADIOLOGY | Age: 73
DRG: 871 | End: 2023-01-19
Payer: COMMERCIAL

## 2023-01-19 ENCOUNTER — HOSPITAL ENCOUNTER (INPATIENT)
Age: 73
LOS: 5 days | Discharge: NURSING, REHAB OR LONG-TERM CARE | DRG: 871 | End: 2023-01-24
Attending: STUDENT IN AN ORGANIZED HEALTH CARE EDUCATION/TRAINING PROGRAM | Admitting: INTERNAL MEDICINE
Payer: COMMERCIAL

## 2023-01-19 DIAGNOSIS — J18.9 HOSPITAL-ACQUIRED PNEUMONIA: Primary | ICD-10-CM

## 2023-01-19 DIAGNOSIS — D53.9 MACROCYTIC ANEMIA: ICD-10-CM
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
## 2023-01-19 DIAGNOSIS — Z93.0 TRACHEOSTOMY DEPENDENCE (HCC): ICD-10-CM

## 2023-01-19 DIAGNOSIS — R07.9 CHEST PAIN SYNDROME: ICD-10-CM

## 2023-01-19 DIAGNOSIS — Z99.2 ESRD ON DIALYSIS (HCC): ICD-10-CM

## 2023-01-19 DIAGNOSIS — N18.6 ESRD ON DIALYSIS (HCC): ICD-10-CM

## 2023-01-19 DIAGNOSIS — D72.829 LEUKOCYTOSIS, UNSPECIFIED TYPE: ICD-10-CM

## 2023-01-19 DIAGNOSIS — G89.4 CHRONIC PAIN SYNDROME: ICD-10-CM

## 2023-01-19 LAB
A/G RATIO: 0.6 (ref 1.1–2.2)
ALBUMIN SERPL-MCNC: 3.3 G/DL (ref 3.4–5)
ALP BLD-CCNC: 89 U/L (ref 40–129)
ALT SERPL-CCNC: 11 U/L (ref 10–40)
ANION GAP SERPL CALCULATED.3IONS-SCNC: 14 MMOL/L (ref 3–16)
AST SERPL-CCNC: 12 U/L (ref 15–37)
BASOPHILS ABSOLUTE: 0.1 K/UL (ref 0–0.2)
BASOPHILS RELATIVE PERCENT: 0.5 %
BILIRUB SERPL-MCNC: 0.7 MG/DL (ref 0–1)
BUN BLDV-MCNC: 55 MG/DL (ref 7–20)
CALCIUM SERPL-MCNC: 8.8 MG/DL (ref 8.3–10.6)
CHLORIDE BLD-SCNC: 91 MMOL/L (ref 99–110)
CO2: 28 MMOL/L (ref 21–32)
CREAT SERPL-MCNC: 5.4 MG/DL (ref 0.6–1.2)
EKG ATRIAL RATE: 80 BPM
EKG DIAGNOSIS: NORMAL
EKG P AXIS: 56 DEGREES
EKG P-R INTERVAL: 254 MS
EKG Q-T INTERVAL: 382 MS
EKG QRS DURATION: 80 MS
EKG QTC CALCULATION (BAZETT): 440 MS
EKG R AXIS: 0 DEGREES
EKG T AXIS: 26 DEGREES
EKG VENTRICULAR RATE: 80 BPM
EOSINOPHILS ABSOLUTE: 0.2 K/UL (ref 0–0.6)
EOSINOPHILS RELATIVE PERCENT: 1.6 %
GFR SERPL CREATININE-BSD FRML MDRD: 8 ML/MIN/{1.73_M2}
GLUCOSE BLD-MCNC: 103 MG/DL (ref 70–99)
GLUCOSE BLD-MCNC: 128 MG/DL (ref 70–99)
HCT VFR BLD CALC: 29 % (ref 36–48)
HEMOGLOBIN: 9.1 G/DL (ref 12–16)
INFLUENZA A: NOT DETECTED
INFLUENZA B: NOT DETECTED
LACTIC ACID: 1.5 MMOL/L (ref 0.4–2)
LYMPHOCYTES ABSOLUTE: 0.9 K/UL (ref 1–5.1)
LYMPHOCYTES RELATIVE PERCENT: 6.8 %
MCH RBC QN AUTO: 32.4 PG (ref 26–34)
MCHC RBC AUTO-ENTMCNC: 31.5 G/DL (ref 31–36)
MCV RBC AUTO: 102.8 FL (ref 80–100)
MONOCYTES ABSOLUTE: 0.8 K/UL (ref 0–1.3)
MONOCYTES RELATIVE PERCENT: 6.1 %
NEUTROPHILS ABSOLUTE: 11.6 K/UL (ref 1.7–7.7)
NEUTROPHILS RELATIVE PERCENT: 85 %
PDW BLD-RTO: 15.6 % (ref 12.4–15.4)
PERFORMED ON: ABNORMAL
PLATELET # BLD: 166 K/UL (ref 135–450)
PMV BLD AUTO: 8.5 FL (ref 5–10.5)
POTASSIUM REFLEX MAGNESIUM: 4.6 MMOL/L (ref 3.5–5.1)
RBC # BLD: 2.82 M/UL (ref 4–5.2)
SARS-COV-2 RNA, RT PCR: NOT DETECTED
SODIUM BLD-SCNC: 133 MMOL/L (ref 136–145)
TOTAL PROTEIN: 8.4 G/DL (ref 6.4–8.2)
TROPONIN: 0.2 NG/ML
TROPONIN: 0.24 NG/ML
WBC # BLD: 13.6 K/UL (ref 4–11)

## 2023-01-19 PROCEDURE — 93010 ELECTROCARDIOGRAM REPORT: CPT | Performed by: INTERNAL MEDICINE

## 2023-01-19 PROCEDURE — 84484 ASSAY OF TROPONIN QUANT: CPT

## 2023-01-19 PROCEDURE — 85025 COMPLETE CBC W/AUTO DIFF WBC: CPT

## 2023-01-19 PROCEDURE — 96365 THER/PROPH/DIAG IV INF INIT: CPT

## 2023-01-19 PROCEDURE — 2580000003 HC RX 258: Performed by: INTERNAL MEDICINE

## 2023-01-19 PROCEDURE — 93005 ELECTROCARDIOGRAM TRACING: CPT | Performed by: STUDENT IN AN ORGANIZED HEALTH CARE EDUCATION/TRAINING PROGRAM

## 2023-01-19 PROCEDURE — 80053 COMPREHEN METABOLIC PANEL: CPT

## 2023-01-19 PROCEDURE — 96375 TX/PRO/DX INJ NEW DRUG ADDON: CPT

## 2023-01-19 PROCEDURE — 71045 X-RAY EXAM CHEST 1 VIEW: CPT

## 2023-01-19 PROCEDURE — 6360000002 HC RX W HCPCS: Performed by: STUDENT IN AN ORGANIZED HEALTH CARE EDUCATION/TRAINING PROGRAM

## 2023-01-19 PROCEDURE — 83605 ASSAY OF LACTIC ACID: CPT

## 2023-01-19 PROCEDURE — 36415 COLL VENOUS BLD VENIPUNCTURE: CPT

## 2023-01-19 PROCEDURE — 2700000000 HC OXYGEN THERAPY PER DAY

## 2023-01-19 PROCEDURE — 6370000000 HC RX 637 (ALT 250 FOR IP): Performed by: INTERNAL MEDICINE

## 2023-01-19 PROCEDURE — 87636 SARSCOV2 & INF A&B AMP PRB: CPT

## 2023-01-19 PROCEDURE — 2580000003 HC RX 258: Performed by: STUDENT IN AN ORGANIZED HEALTH CARE EDUCATION/TRAINING PROGRAM

## 2023-01-19 PROCEDURE — 94761 N-INVAS EAR/PLS OXIMETRY MLT: CPT

## 2023-01-19 PROCEDURE — 99285 EMERGENCY DEPT VISIT HI MDM: CPT

## 2023-01-19 PROCEDURE — 2060000000 HC ICU INTERMEDIATE R&B

## 2023-01-19 PROCEDURE — 6370000000 HC RX 637 (ALT 250 FOR IP): Performed by: STUDENT IN AN ORGANIZED HEALTH CARE EDUCATION/TRAINING PROGRAM

## 2023-01-19 PROCEDURE — 6360000002 HC RX W HCPCS: Performed by: INTERNAL MEDICINE

## 2023-01-19 PROCEDURE — 87040 BLOOD CULTURE FOR BACTERIA: CPT

## 2023-01-19 RX ORDER — LORAZEPAM 0.5 MG/1
0.5 TABLET ORAL EVERY 12 HOURS PRN
Status: DISCONTINUED | OUTPATIENT
Start: 2023-01-19 | End: 2023-01-24 | Stop reason: HOSPADM

## 2023-01-19 RX ORDER — IPRATROPIUM BROMIDE AND ALBUTEROL SULFATE 2.5; .5 MG/3ML; MG/3ML
1 SOLUTION RESPIRATORY (INHALATION) 4 TIMES DAILY
Status: DISCONTINUED | OUTPATIENT
Start: 2023-01-20 | End: 2023-01-20

## 2023-01-19 RX ORDER — ONDANSETRON 2 MG/ML
4 INJECTION INTRAMUSCULAR; INTRAVENOUS EVERY 6 HOURS PRN
Status: DISCONTINUED | OUTPATIENT
Start: 2023-01-19 | End: 2023-01-24 | Stop reason: HOSPADM

## 2023-01-19 RX ORDER — SODIUM CHLORIDE 0.9 % (FLUSH) 0.9 %
5-40 SYRINGE (ML) INJECTION EVERY 12 HOURS SCHEDULED
Status: DISCONTINUED | OUTPATIENT
Start: 2023-01-19 | End: 2023-01-24 | Stop reason: HOSPADM

## 2023-01-19 RX ORDER — POLYETHYLENE GLYCOL 3350 17 G/17G
17 POWDER, FOR SOLUTION ORAL DAILY PRN
Status: DISCONTINUED | OUTPATIENT
Start: 2023-01-19 | End: 2023-01-24 | Stop reason: HOSPADM

## 2023-01-19 RX ORDER — ACETAMINOPHEN 650 MG/1
650 SUPPOSITORY RECTAL EVERY 6 HOURS PRN
Status: DISCONTINUED | OUTPATIENT
Start: 2023-01-19 | End: 2023-01-24 | Stop reason: HOSPADM

## 2023-01-19 RX ORDER — GABAPENTIN 300 MG/1
300 CAPSULE ORAL 3 TIMES DAILY
COMMUNITY

## 2023-01-19 RX ORDER — 0.9 % SODIUM CHLORIDE 0.9 %
1000 INTRAVENOUS SOLUTION INTRAVENOUS ONCE
Status: COMPLETED | OUTPATIENT
Start: 2023-01-19 | End: 2023-01-19

## 2023-01-19 RX ORDER — ACETAMINOPHEN 325 MG/1
650 TABLET ORAL EVERY 6 HOURS PRN
Status: DISCONTINUED | OUTPATIENT
Start: 2023-01-19 | End: 2023-01-24 | Stop reason: HOSPADM

## 2023-01-19 RX ORDER — ONDANSETRON 4 MG/1
4 TABLET, ORALLY DISINTEGRATING ORAL EVERY 8 HOURS PRN
Status: DISCONTINUED | OUTPATIENT
Start: 2023-01-19 | End: 2023-01-24 | Stop reason: HOSPADM

## 2023-01-19 RX ORDER — SENNA AND DOCUSATE SODIUM 50; 8.6 MG/1; MG/1
2 TABLET, FILM COATED ORAL NIGHTLY
COMMUNITY

## 2023-01-19 RX ORDER — ATORVASTATIN CALCIUM 40 MG/1
40 TABLET, FILM COATED ORAL NIGHTLY
Status: DISCONTINUED | OUTPATIENT
Start: 2023-01-19 | End: 2023-01-24 | Stop reason: HOSPADM

## 2023-01-19 RX ORDER — SODIUM CHLORIDE 0.9 % (FLUSH) 0.9 %
5-40 SYRINGE (ML) INJECTION PRN
Status: DISCONTINUED | OUTPATIENT
Start: 2023-01-19 | End: 2023-01-24 | Stop reason: HOSPADM

## 2023-01-19 RX ORDER — IPRATROPIUM BROMIDE AND ALBUTEROL SULFATE 2.5; .5 MG/3ML; MG/3ML
1 SOLUTION RESPIRATORY (INHALATION)
Status: DISCONTINUED | OUTPATIENT
Start: 2023-01-19 | End: 2023-01-19

## 2023-01-19 RX ORDER — SODIUM CHLORIDE 9 MG/ML
INJECTION, SOLUTION INTRAVENOUS PRN
Status: DISCONTINUED | OUTPATIENT
Start: 2023-01-19 | End: 2023-01-24 | Stop reason: HOSPADM

## 2023-01-19 RX ORDER — MIDODRINE HYDROCHLORIDE 10 MG/1
10 TABLET ORAL
Status: DISCONTINUED | OUTPATIENT
Start: 2023-01-19 | End: 2023-01-20

## 2023-01-19 RX ORDER — HYDROCORTISONE 25 MG/G
CREAM TOPICAL 2 TIMES DAILY
Status: DISCONTINUED | OUTPATIENT
Start: 2023-01-19 | End: 2023-01-24 | Stop reason: HOSPADM

## 2023-01-19 RX ORDER — IPRATROPIUM BROMIDE AND ALBUTEROL SULFATE 2.5; .5 MG/3ML; MG/3ML
1 SOLUTION RESPIRATORY (INHALATION) EVERY 4 HOURS PRN
Status: DISCONTINUED | OUTPATIENT
Start: 2023-01-19 | End: 2023-01-23

## 2023-01-19 RX ADMIN — PIPERACILLIN AND TAZOBACTAM 3375 MG: 3; .375 INJECTION, POWDER, FOR SOLUTION INTRAVENOUS at 16:14

## 2023-01-19 RX ADMIN — SODIUM CHLORIDE 1000 ML: 9 INJECTION, SOLUTION INTRAVENOUS at 16:36

## 2023-01-19 RX ADMIN — APIXABAN 2.5 MG: 5 TABLET, FILM COATED ORAL at 22:38

## 2023-01-19 RX ADMIN — Medication 1500 MG: at 16:24

## 2023-01-19 RX ADMIN — MIDODRINE HYDROCHLORIDE 10 MG: 10 TABLET ORAL at 16:34

## 2023-01-19 RX ADMIN — ATORVASTATIN CALCIUM 40 MG: 40 TABLET, FILM COATED ORAL at 22:38

## 2023-01-19 RX ADMIN — SODIUM CHLORIDE 1000 ML: 9 INJECTION, SOLUTION INTRAVENOUS at 15:46

## 2023-01-19 RX ADMIN — CEFEPIME 1000 MG: 1 INJECTION, POWDER, FOR SOLUTION INTRAMUSCULAR; INTRAVENOUS at 19:31

## 2023-01-19 ASSESSMENT — LIFESTYLE VARIABLES: HOW OFTEN DO YOU HAVE A DRINK CONTAINING ALCOHOL: NEVER

## 2023-01-19 ASSESSMENT — PAIN - FUNCTIONAL ASSESSMENT: PAIN_FUNCTIONAL_ASSESSMENT: 0-10

## 2023-01-19 ASSESSMENT — PAIN SCALES - GENERAL: PAINLEVEL_OUTOF10: 10

## 2023-01-19 NOTE — ED NOTES
1747-Perfect Serve sent to Dr. Aaron Arredondo for consult placed by Dr. Marilee Lam. 1753-Call back received from Dr. Aaron Arredondo regarding consult spoke with Dr. Marilee Lam.       Olga Meyer  01/19/23 7834

## 2023-01-19 NOTE — ED PROVIDER NOTES
Emergency Department Provider Note  Location: [place] [address] ED  1/19/2023     Patient Identification  Bharati Goode is a 71 y.o. female    Chief Complaint  Other ([address], DIDN'T GET ANY FLUID OFF. POTASSIUM WAS 7. )      Mode of Arrival  EMS    HPI  (History provided by patient)  This is a 71 y.o. female with a PMH significant for CVA, diabetes, CKD on renal dialysis  presented today for abnormal lab. Patient went to dialysis today receiving treatment but sent here because her potassium was 7. Patient reports that she has been compliant with her dialysis. She goes every Tuesday, Thursday, Saturday. She reports ongoing dry cough with some mild shortness of breath. She denies any chest pain or fever. Denies any nausea, vomiting, abdominal pain, changes in bowel, bloody stool, syncopal episode or new numbness or tingling. Denies any new medications. ROS  Review of Systems   All other systems reviewed and are negative. I have reviewed the following nursing documentation:  Allergies:    Allergies   Allergen Reactions    Haloperidol Lactate Other (See Comments)     PT DOESN'T REMEMBER      Ziprasidone Hcl Other (See Comments)     PT DOESN'T REMEMBER         Past medical history:  has a past medical history of Acute and chronic respiratory failure (acute-on-chronic) ([address] Acute blood loss anemia (7/1/2020), Acute deep vein thrombosis (DVT) of brachial vein of left upper extremity ([address] (2/20/2019), Anxiety disorder, Atherosclerotic heart disease of native coronary artery without angina pectoris, Bleeding hemorrhoids (6/29/2020), Cerebellar infarct ([address] (9/29/2019), Cerebral infarction ([address] Chronic pain syndrome, Dependence on renal dialysis ([address] Dependence on respirator ([address] End stage renal disease ([address] Gastro-esophageal reflux disease with esophagitis, without bleeding, Insomnia, Major depressive disorder, recurrent ([address] Morbid obesity due to excess calories ([address] Muscle weakness, Obstructive sleep apnea (adult) (pediatric), Other hyperlipidemia, Other voice and resonance disorders, Personal history of COVID-19, Pulmonary hypertension ([facility] Tracheostomy status ([facility] [address] Type 2 diabetes mellitus without complications ([facility] [address] and Unspecified atrial fibrillation ([facility] [address] Past surgical history:  has a past surgical history that includes colectomy (N/A, 5/10/2022). Home medications:   Prior to Admission medications    Medication Sig Start Date End Date Taking? Authorizing Provider   BISACODYL RE Place rectally daily as needed    Historical Provider, MD   Magnesium Hydroxide (DULCOLAX PO) Take 10 mg by mouth daily as needed    Historical Provider, MD   sodium phosphate (FLEET) 7-19 GM/118ML Place 1 enema rectally once as needed    Historical Provider, MD   LORazepam (ATIVAN) 0.5 MG tablet Take 0.5 mg by mouth every 12 hours as needed for Anxiety. Historical Provider, MD   oxyCODONE (ROXICODONE) 5 MG immediate release tablet Take 5 mg by mouth in the morning and at bedtime. Historical Provider, MD   UNABLE TO FIND Veltessa 8.4 gm by mouth one time a day every Monday, Wednesday, Friday, Sunday for hyperkalemia    Historical Provider, MD   atorvastatin (LIPITOR) 40 MG tablet Take 1 tablet by mouth nightly 10/28/22   Rajesh Dawson MD   carboxymethylcellulose PF (THERATEARS) 1 % GEL ophthalmic gel Place 1 drop into both eyes every 4 hours as needed for Dry Eyes 10/28/22   Rajesh Dawson MD   lidocaine (XYLOCAINE) 5 % ointment Apply topically as needed.  10/28/22   Rajesh Dawson MD   apixaban (ELIQUIS) 2.5 MG TABS tablet Take 2.5 mg by mouth 2 times daily    Historical Provider, MD   loperamide (IMODIUM) 2 MG capsule Take 2 mg by mouth 4 times daily as needed for Diarrhea    Historical Provider, MD   promethazine (PHENERGAN) 12.5 MG tablet Take 12.5 mg by mouth every 6 hours as needed for Nausea    Historical Provider, MD   traZODone (DESYREL) 50 MG tablet Take 50 mg by mouth nightly    Historical Provider, MD   vitamin B-12 (CYANOCOBALAMIN) 500 MCG tablet Take 500 mcg by mouth daily    Historical Provider, MD   metoprolol tartrate (LOPRESSOR) 25 MG tablet Take 1 tablet by mouth 2 times daily 5/21/22   Severo Moon, MD   warfarin (COUMADIN) 3 MG tablet Take 1 tablet by mouth nightly  Patient not taking: Reported on 9/15/2022 4/30/22 9/19/22  Fozia Rico MD   hydrocortisone (ANUSOL-HC) 2.5 % CREA rectal cream Place rectally 2 times daily 3/3/22   David Schaffer DO   calcium acetate 667 MG TABS Take 3 capsules by mouth 3 times daily (with meals)    Historical Provider, MD   hydrocortisone 1 % cream Apply topically 2 times daily Apply topically 2 times daily. Historical Provider, MD   acetaminophen (TYLENOL) 325 MG tablet Take 650 mg by mouth every 6 hours as needed for Pain    Historical Provider, MD   diphenhydrAMINE (BENADRYL) 25 MG capsule Take 25 mg by mouth every 8 hours as needed     Historical Provider, MD   polyethylene glycol (GLYCOLAX) 17 g packet Take 17 g by mouth daily as needed for Constipation    Historical Provider, MD   ipratropium-albuterol (DUONEB) 0.5-2.5 (3) MG/3ML SOLN nebulizer solution Inhale 1 vial into the lungs every 4 hours    Historical Provider, MD   midodrine (PROAMATINE) 5 MG tablet Take 10 mg by mouth three times a week Tues thurs sat pre dialysis    Historical Provider, MD   omeprazole (PRILOSEC) 20 MG delayed release capsule Take 20 mg by mouth daily    Historical Provider, MD   chlorhexidine (PERIDEX) 0.12 % solution Take 15 mLs by mouth 2 times daily    Historical Provider, MD   sertraline (ZOLOFT) 50 MG tablet Take 50 mg by mouth daily    Historical Provider, MD   furosemide (LASIX) 20 MG tablet Take 20 mg by mouth three times a week [facility]  Patient not taking: Reported on 9/15/2022  9/19/22  Historical Provider, MD       Social history:  reports that she has quit smoking.  She has never used smokeless tobacco. She reports that she does not drink alcohol and does not use drugs. Family history:  No family history on file. Exam  ED Triage Vitals   BP Temp Temp Source Heart Rate Resp SpO2 Height Weight   01/19/23 1418 01/19/23 1421 01/19/23 1418 01/19/23 1418 01/19/23 1418 01/19/23 1418 01/19/23 1421 01/19/23 1421   132/73 97.6 °F (36.4 °C) Oral 80 20 98 % 5' 7\" (1.702 m) (!) 360 lb (163.3 kg)     Physical Exam  Vitals and nursing note reviewed. Constitutional:       Appearance: She is not toxic-appearing or diaphoretic. HENT:      Head: Normocephalic and atraumatic. Right Ear: External ear normal.      Left Ear: External ear normal.      Nose: Nose normal.      Mouth/Throat:      Pharynx: Oropharynx is clear. No oropharyngeal exudate. Eyes:      Extraocular Movements: Extraocular movements intact. Conjunctiva/sclera: Conjunctivae normal.   Cardiovascular:      Rate and Rhythm: Normal rate and regular rhythm. Heart sounds: Murmur heard. No friction rub. No gallop. Pulmonary:      Effort: Pulmonary effort is normal. No respiratory distress. Breath sounds: Normal breath sounds. Abdominal:      General: There is no distension. Palpations: Abdomen is soft. Tenderness: There is no abdominal tenderness. There is no guarding or rebound. Musculoskeletal:         General: Normal range of motion. Cervical back: Normal range of motion and neck supple. Skin:     General: Skin is warm. Capillary Refill: Capillary refill takes less than 2 seconds. Neurological:      General: No focal deficit present. Mental Status: She is alert and oriented to person, place, and time. Cranial Nerves: No cranial nerve deficit.    Psychiatric:         Mood and Affect: Mood normal.         Behavior: Behavior normal.           MDM/ED Course  ED Medication Orders (From admission, onward)      Start Ordered     Status Ordering Provider    01/19/23 1645 01/19/23 1631  midodrine (PROAMATINE) tablet 10 mg  3 TIMES DAILY WITH MEALS         Last MAR action: Given - by [facility] on 01/19/23 at [place] [address], [facility]    01/19/23 1645 01/19/23 1633  0.9 % sodium chloride bolus  ONCE         Last MAR action: Stopped - by Mingo Matos on 01/19/23 at [address] [place] [address], [facility]    01/19/23 1615 01/19/23 1600  vancomycin 1500 mg in dextrose 5% 300 mL IVPB  ONCE        Question:  Antimicrobial Indications  Answer:  Pneumonia (HAP)    Last MAR action: New Bag - by Huyenbrooklyn Matos on 01/19/23 at [place] [address], [facility]    01/19/23 1615 01/19/23 1600  piperacillin-tazobactam (ZOSYN) 3,375 mg in sodium chloride 0.9 % 100 mL IVPB (mini-bag)  ONCE        Question:  Antimicrobial Indications  Answer:  Pneumonia (HAP)    Last MAR action: Stopped - by Huyenadriwily Milford on 01/19/23 at [address]    01/19/23 1545 01/19/23 1544  0.9 % sodium chloride bolus  ONCE         Last MAR action: Stopped - by Joewily Milford on 01/19/23 at [address] [place] [address]            EKG    I interpreted the EKG and note sinus rhythm with a first-degree AV block, normal QRS, normal QTC, no significant change when compared to prior    Radiology  XR CHEST PORTABLE    Result Date: 1/19/2023  EXAMINATION: ONE XRAY VIEW OF THE CHEST 1/19/2023 2:20 pm COMPARISON: Chest x-ray dated 25 October 2022 HISTORY: ORDERING SYSTEM PROVIDED HISTORY: sob TECHNOLOGIST PROVIDED HISTORY: Reason for exam:->sob Reason for Exam: Shortness of breath FINDINGS: Stable cardiomediastinal silhouette with mild cardiomegaly. Tracheostomy tube is in place. Right basilar airspace disease. No pneumothorax or pleural effusion. 1.  Right basilar airspace disease. This could reflect atelectasis versus pneumonia in the appropriate clinical setting.  2.  Mild cardiomegaly        Labs  Results for orders placed or performed during the hospital encounter of 01/19/23   CBC with Auto Differential   Result Value Ref Range    WBC 13.6 (H) 4.0 - 11.0 K/uL    RBC 2.82 (L) 4.00 - 5.20 M/uL    Hemoglobin 9.1 (L) 12.0 - 16.0 g/dL    Hematocrit 29.0 (L) 36.0 - 48.0 %    .8 (H) 80.0 - 100.0 fL    MCH 32.4 26.0 - 34.0 pg    MCHC 31.5 31.0 - 36.0 g/dL    RDW 15.6 (H) 12.4 - 15.4 %    Platelets 229 659 - 787 K/uL    MPV 8.5 5.0 - 10.5 fL    Neutrophils % 85.0 %    Lymphocytes % 6.8 %    Monocytes % 6.1 %    Eosinophils % 1.6 %    Basophils % 0.5 %    Neutrophils Absolute 11.6 (H) 1.7 - 7.7 K/uL    Lymphocytes Absolute 0.9 (L) 1.0 - 5.1 K/uL    Monocytes Absolute 0.8 0.0 - 1.3 K/uL    Eosinophils Absolute 0.2 0.0 - 0.6 K/uL    Basophils Absolute 0.1 0.0 - 0.2 K/uL   CMP w/ Reflex to MG   Result Value Ref Range    Sodium 133 (L) 136 - 145 mmol/L    Potassium reflex Magnesium 4.6 3.5 - 5.1 mmol/L    Chloride 91 (L) 99 - 110 mmol/L    CO2 28 21 - 32 mmol/L    Anion Gap 14 3 - 16    Glucose 128 (H) 70 - 99 mg/dL    BUN 55 (H) 7 - 20 mg/dL    Creatinine 5.4 (HH) 0.6 - 1.2 mg/dL    Est, Glom Filt Rate 8 (A) >60    Calcium 8.8 8.3 - 10.6 mg/dL    Total Protein 8.4 (H) 6.4 - 8.2 g/dL    Albumin 3.3 (L) 3.4 - 5.0 g/dL    Albumin/Globulin Ratio 0.6 (L) 1.1 - 2.2    Total Bilirubin 0.7 0.0 - 1.0 mg/dL    Alkaline Phosphatase 89 40 - 129 U/L    ALT 11 10 - 40 U/L    AST 12 (L) 15 - 37 U/L   Troponin   Result Value Ref Range    Troponin 0.24 (H) <0.01 ng/mL   Lactic Acid   Result Value Ref Range    Lactic Acid 1.5 0.4 - 2.0 mmol/L   Troponin   Result Value Ref Range    Troponin 0.20 (H) <0.01 ng/mL   EKG 12 Lead   Result Value Ref Range    Ventricular Rate 80 BPM    Atrial Rate 80 BPM    P-R Interval 254 ms    QRS Duration 80 ms    Q-T Interval 382 ms    QTc Calculation (Bazett) 440 ms    P Axis 56 degrees    R Axis 0 degrees    T Axis 26 degrees    Diagnosis       Sinus rhythm with 1st degree A-V blockLow voltage QRSCannot rule out Anterior infarct (cited on or before 19-JAN-2023)Abnormal ECGWhen compared with ECG of 27-OCT-2022 07:18,No significant change was foundConfirmed by BRIAN PHILLIPS, MARIA ([id number]) on 1/19/2023 4:54:06 PM           - Patient seen and evaluated in room 01.  71 y.o. female presented for elevated potassium level after dialysis treatment. Patient presented normotensive, afebrile, heart rate of 80, respiratory rate of 20 and satting at 90% on room air. On exam she did have rales at lung bases and bilateral lower extremity. Given history and exam my differential diagnosis includes but is not limited to ACS, CHF exacerbation, arrhythmia, pneumonia, electrolyte abnormalities, worsening renal function. I obtained labs and imaging studies as noted below  - Patient was placed on telemetry during his/her ED stay and no malignant dysrhythmia observed. - Pertinent old records reviewed. -Patient has follow-up with primary care physician  -No significant social stressors at this time  - Patient was given 2L IV fluid, IV vancomycin, IV Zosyn, and 10 mg of Midrin given that she did not receive this after dialysis in the ED. Upon reassessment, patient .    - Diagnostic studies reviewed and interpreted by me   - EKG first-degree AV block, normal QRS, normal QTC, no significant change when compared to prior   - CXR right basilar airspace disease. This could reflect atelectasis versus pneumonia in the appropriate clinical setting. There is mild cardiomegaly. - Lab:  CBC with a leukocytosis to 13.6, macrocytic anemia with a hemoglobin of 9.1, hematocrit of 29, normal platelets, elevated absolute neutrophil count to 11.6  CMP with low sodium at 133, normal potassium, elevated BUN to 55 and a creatinine of 5.4, hyperglycemic to 128, low AST, normal ALT, alk phos and bilirubin  Elevated troponin to 0.24 which appears elevated from baseline. Repeat troponin decreased at 0.20  Lactic acid within normal limits  Blood cultures pending    -I spoke with Dr. Julio Flaherty with nephrology who states that she does not need any emergent dialysis given that she completed treatment today.   He is reassured by repeat potassium level.    - I discussed the results with patient. We agreed to admit for acute hospital-acquired pneumonia. Again no significant potassium derangements. She is high risk CURB score 4. Therefore will admit for IV antibiotics. Patient amenable with plan. Exclusion criteria - the patient is NOT to be included for SEP-1 Core Measure due to: 2+ SIRS criteria are not met        Clinical Impression:  1. Hospital-acquired pneumonia    2. ESRD on dialysis ([address]    3. Tracheostomy dependence ([address]    4. Leukocytosis, unspecified type    5. Macrocytic anemia          Disposition:  Admit to telemetry in stable condition. Blood pressure (!) 105/48, pulse 71, temperature 97.6 °F (36.4 °C), temperature source Oral, resp. rate 15, height 5' 7\" (1.702 m), weight (!) 360 lb (163.3 kg), SpO2 97 %. Patient was given scripts for the following medications. I counseled patient how to take these medications. New Prescriptions    No medications on file       Disposition referral (if applicable):  No follow-up provider specified. Total critical care time is 35 minutes, which excludes separately billable procedures and updating family. Time spent is specifically for management of the presenting complaint and symptoms initially, direct bedside care, reevaluation, review of records, and consultation. There was a high probability of clinically significant life-threatening deterioration in the patient's condition, which required my urgent intervention. This chart was generated in part by using Dragon Dictation system and may contain errors related to that system including errors in grammar, punctuation, and spelling, as well as words and phrases that may be inappropriate. If there are any questions or concerns please feel free to contact the dictating provider for clarification.      Eliza Knight MD  [facility]        Eliza Knight MD  01/19/23 5486

## 2023-01-19 NOTE — PLAN OF CARE
70-year-old female, nursing home patient, ESRD on HD, trach dependent. Sent into ER after dialysis for high potassium. Potassium reported at 7.0 her dialysis unit after dialysis today. Repeat potassium in ED within normal limits at 4.5. Patient complained of shortness of breath in the ED, chest x-ray suggestive of pneumonia, white count elevated. Admission requested for HCAP . Patient given Vanco and Zosyn in the ED. ordered switched to Vanco and cefepime on admission. Lactic acid level normal    Chronically elevated troponin    Hypotensive on arrival to ED, improved with home dose of midodrine. Nehal Shepard

## 2023-01-19 NOTE — ED NOTES
1615-Call placed to Nephrology for consult placed by Dr. Gloria Guadalupe. Justa Snowden  01/19/23 1615  1622-Call back received from Dr. Sayda Escobedo Nephrology spoke with Dr. Gloria Guadalupe.      Justa Snowden  01/19/23 1627

## 2023-01-20 PROBLEM — D53.9 MACROCYTIC ANEMIA: Status: ACTIVE | Noted: 2023-01-20

## 2023-01-20 PROBLEM — G93.40 ACUTE ENCEPHALOPATHY: Status: ACTIVE | Noted: 2023-01-20

## 2023-01-20 PROBLEM — J18.9 HCAP (HEALTHCARE-ASSOCIATED PNEUMONIA): Status: ACTIVE | Noted: 2023-01-20

## 2023-01-20 PROBLEM — D72.829 LEUKOCYTOSIS: Status: ACTIVE | Noted: 2023-01-20

## 2023-01-20 LAB
ANION GAP SERPL CALCULATED.3IONS-SCNC: 14 MMOL/L (ref 3–16)
BASE EXCESS ARTERIAL: 0.5 MMOL/L (ref -3–3)
BASOPHILS ABSOLUTE: 0 K/UL (ref 0–0.2)
BASOPHILS RELATIVE PERCENT: 0.2 %
BUN BLDV-MCNC: 63 MG/DL (ref 7–20)
CALCIUM SERPL-MCNC: 8.1 MG/DL (ref 8.3–10.6)
CARBOXYHEMOGLOBIN ARTERIAL: 1 % (ref 0–1.5)
CHLORIDE BLD-SCNC: 96 MMOL/L (ref 99–110)
CO2: 22 MMOL/L (ref 21–32)
CREAT SERPL-MCNC: 5.4 MG/DL (ref 0.6–1.2)
EOSINOPHILS ABSOLUTE: 0.3 K/UL (ref 0–0.6)
EOSINOPHILS RELATIVE PERCENT: 2.4 %
GFR SERPL CREATININE-BSD FRML MDRD: 8 ML/MIN/{1.73_M2}
GLUCOSE BLD-MCNC: 133 MG/DL (ref 70–99)
GLUCOSE BLD-MCNC: 168 MG/DL (ref 70–99)
HCO3 ARTERIAL: 26.7 MMOL/L (ref 21–29)
HCT VFR BLD CALC: 25.5 % (ref 36–48)
HEMOGLOBIN, ART, EXTENDED: 9.8 G/DL (ref 12–16)
HEMOGLOBIN: 8.2 G/DL (ref 12–16)
LYMPHOCYTES ABSOLUTE: 0.9 K/UL (ref 1–5.1)
LYMPHOCYTES RELATIVE PERCENT: 6.8 %
MCH RBC QN AUTO: 32.4 PG (ref 26–34)
MCHC RBC AUTO-ENTMCNC: 32 G/DL (ref 31–36)
MCV RBC AUTO: 101.3 FL (ref 80–100)
METHEMOGLOBIN ARTERIAL: 0.3 %
MONOCYTES ABSOLUTE: 1.1 K/UL (ref 0–1.3)
MONOCYTES RELATIVE PERCENT: 7.9 %
NEUTROPHILS ABSOLUTE: 11.5 K/UL (ref 1.7–7.7)
NEUTROPHILS RELATIVE PERCENT: 82.7 %
O2 SAT, ARTERIAL: 92.3 %
O2 THERAPY: ABNORMAL
PCO2 ARTERIAL: 50.9 MMHG (ref 35–45)
PDW BLD-RTO: 15.4 % (ref 12.4–15.4)
PERFORMED ON: ABNORMAL
PH ARTERIAL: 7.34 (ref 7.35–7.45)
PLATELET # BLD: 168 K/UL (ref 135–450)
PMV BLD AUTO: 8.7 FL (ref 5–10.5)
PO2 ARTERIAL: 67.9 MMHG (ref 75–108)
POTASSIUM REFLEX MAGNESIUM: 5.1 MMOL/L (ref 3.5–5.1)
PROCALCITONIN: 1.72 NG/ML (ref 0–0.15)
RBC # BLD: 2.52 M/UL (ref 4–5.2)
SODIUM BLD-SCNC: 132 MMOL/L (ref 136–145)
TCO2 ARTERIAL: 28.3 MMOL/L
VANCOMYCIN RANDOM: 7.6 UG/ML
WBC # BLD: 13.9 K/UL (ref 4–11)

## 2023-01-20 PROCEDURE — 36415 COLL VENOUS BLD VENIPUNCTURE: CPT

## 2023-01-20 PROCEDURE — 6370000000 HC RX 637 (ALT 250 FOR IP): Performed by: INTERNAL MEDICINE

## 2023-01-20 PROCEDURE — 2060000000 HC ICU INTERMEDIATE R&B

## 2023-01-20 PROCEDURE — 2580000003 HC RX 258: Performed by: INTERNAL MEDICINE

## 2023-01-20 PROCEDURE — 80048 BASIC METABOLIC PNL TOTAL CA: CPT

## 2023-01-20 PROCEDURE — 6370000000 HC RX 637 (ALT 250 FOR IP)

## 2023-01-20 PROCEDURE — 2700000000 HC OXYGEN THERAPY PER DAY

## 2023-01-20 PROCEDURE — 87205 SMEAR GRAM STAIN: CPT

## 2023-01-20 PROCEDURE — 92610 EVALUATE SWALLOWING FUNCTION: CPT

## 2023-01-20 PROCEDURE — 80202 ASSAY OF VANCOMYCIN: CPT

## 2023-01-20 PROCEDURE — 94640 AIRWAY INHALATION TREATMENT: CPT

## 2023-01-20 PROCEDURE — 82803 BLOOD GASES ANY COMBINATION: CPT

## 2023-01-20 PROCEDURE — 87641 MR-STAPH DNA AMP PROBE: CPT

## 2023-01-20 PROCEDURE — 6360000002 HC RX W HCPCS: Performed by: INTERNAL MEDICINE

## 2023-01-20 PROCEDURE — 99223 1ST HOSP IP/OBS HIGH 75: CPT | Performed by: INTERNAL MEDICINE

## 2023-01-20 PROCEDURE — 87070 CULTURE OTHR SPECIMN AEROBIC: CPT

## 2023-01-20 PROCEDURE — 36600 WITHDRAWAL OF ARTERIAL BLOOD: CPT

## 2023-01-20 PROCEDURE — 92526 ORAL FUNCTION THERAPY: CPT

## 2023-01-20 PROCEDURE — 84145 PROCALCITONIN (PCT): CPT

## 2023-01-20 PROCEDURE — 85025 COMPLETE CBC W/AUTO DIFF WBC: CPT

## 2023-01-20 RX ORDER — CHLORHEXIDINE GLUCONATE 0.12 MG/ML
15 RINSE ORAL 2 TIMES DAILY
Status: DISCONTINUED | OUTPATIENT
Start: 2023-01-20 | End: 2023-01-24 | Stop reason: HOSPADM

## 2023-01-20 RX ORDER — TRAZODONE HYDROCHLORIDE 50 MG/1
50 TABLET ORAL NIGHTLY
Status: DISCONTINUED | OUTPATIENT
Start: 2023-01-20 | End: 2023-01-21

## 2023-01-20 RX ORDER — PANTOPRAZOLE SODIUM 40 MG/1
40 TABLET, DELAYED RELEASE ORAL
Status: DISCONTINUED | OUTPATIENT
Start: 2023-01-21 | End: 2023-01-24 | Stop reason: HOSPADM

## 2023-01-20 RX ORDER — DOXERCALCIFEROL 2 UG/ML
3 INJECTION, SOLUTION INTRAVENOUS
Status: DISCONTINUED | OUTPATIENT
Start: 2023-01-21 | End: 2023-01-24 | Stop reason: HOSPADM

## 2023-01-20 RX ORDER — IPRATROPIUM BROMIDE AND ALBUTEROL SULFATE 2.5; .5 MG/3ML; MG/3ML
1 SOLUTION RESPIRATORY (INHALATION) EVERY 4 HOURS
Status: DISCONTINUED | OUTPATIENT
Start: 2023-01-20 | End: 2023-01-24 | Stop reason: HOSPADM

## 2023-01-20 RX ORDER — SENNA AND DOCUSATE SODIUM 50; 8.6 MG/1; MG/1
2 TABLET, FILM COATED ORAL NIGHTLY
Status: DISCONTINUED | OUTPATIENT
Start: 2023-01-20 | End: 2023-01-24 | Stop reason: HOSPADM

## 2023-01-20 RX ORDER — GABAPENTIN 300 MG/1
300 CAPSULE ORAL 3 TIMES DAILY
Status: DISCONTINUED | OUTPATIENT
Start: 2023-01-20 | End: 2023-01-21

## 2023-01-20 RX ORDER — OXYCODONE HYDROCHLORIDE 5 MG/1
5 TABLET ORAL 2 TIMES DAILY
Status: DISCONTINUED | OUTPATIENT
Start: 2023-01-20 | End: 2023-01-21

## 2023-01-20 RX ORDER — CARBOXYMETHYLCELLULOSE SODIUM 10 MG/ML
1 GEL OPHTHALMIC EVERY 4 HOURS PRN
Status: DISCONTINUED | OUTPATIENT
Start: 2023-01-20 | End: 2023-01-24 | Stop reason: HOSPADM

## 2023-01-20 RX ORDER — MIDODRINE HYDROCHLORIDE 10 MG/1
10 TABLET ORAL
Status: DISCONTINUED | OUTPATIENT
Start: 2023-01-20 | End: 2023-01-23

## 2023-01-20 RX ADMIN — IPRATROPIUM BROMIDE AND ALBUTEROL SULFATE 3 ML: .5; 2.5 SOLUTION RESPIRATORY (INHALATION) at 23:51

## 2023-01-20 RX ADMIN — IPRATROPIUM BROMIDE AND ALBUTEROL SULFATE 3 ML: .5; 2.5 SOLUTION RESPIRATORY (INHALATION) at 15:18

## 2023-01-20 RX ADMIN — APIXABAN 2.5 MG: 5 TABLET, FILM COATED ORAL at 08:46

## 2023-01-20 RX ADMIN — MIDODRINE HYDROCHLORIDE 10 MG: 10 TABLET ORAL at 08:46

## 2023-01-20 RX ADMIN — ATORVASTATIN CALCIUM 40 MG: 40 TABLET, FILM COATED ORAL at 20:16

## 2023-01-20 RX ADMIN — IPRATROPIUM BROMIDE AND ALBUTEROL SULFATE 1 AMPULE: 2.5; .5 SOLUTION RESPIRATORY (INHALATION) at 00:07

## 2023-01-20 RX ADMIN — APIXABAN 2.5 MG: 5 TABLET, FILM COATED ORAL at 20:15

## 2023-01-20 RX ADMIN — Medication 15 ML: at 20:20

## 2023-01-20 RX ADMIN — CEFEPIME 1000 MG: 1 INJECTION, POWDER, FOR SOLUTION INTRAMUSCULAR; INTRAVENOUS at 20:12

## 2023-01-20 RX ADMIN — TRAZODONE HYDROCHLORIDE 50 MG: 50 TABLET ORAL at 20:16

## 2023-01-20 RX ADMIN — SODIUM CHLORIDE, PRESERVATIVE FREE 10 ML: 5 INJECTION INTRAVENOUS at 20:20

## 2023-01-20 RX ADMIN — IPRATROPIUM BROMIDE AND ALBUTEROL SULFATE 3 ML: .5; 2.5 SOLUTION RESPIRATORY (INHALATION) at 20:41

## 2023-01-20 RX ADMIN — HYDROCORTISONE 2.5%: 25 CREAM TOPICAL at 20:21

## 2023-01-20 RX ADMIN — IPRATROPIUM BROMIDE AND ALBUTEROL SULFATE 3 ML: .5; 2.5 SOLUTION RESPIRATORY (INHALATION) at 11:08

## 2023-01-20 RX ADMIN — OXYCODONE HYDROCHLORIDE 5 MG: 5 TABLET ORAL at 20:16

## 2023-01-20 RX ADMIN — IPRATROPIUM BROMIDE AND ALBUTEROL SULFATE 1 AMPULE: 2.5; .5 SOLUTION RESPIRATORY (INHALATION) at 08:34

## 2023-01-20 RX ADMIN — HYDROCORTISONE 2.5%: 25 CREAM TOPICAL at 17:54

## 2023-01-20 RX ADMIN — SENNOSIDES AND DOCUSATE SODIUM 2 TABLET: 50; 8.6 TABLET ORAL at 20:15

## 2023-01-20 ASSESSMENT — PAIN DESCRIPTION - LOCATION: LOCATION: BUTTOCKS

## 2023-01-20 ASSESSMENT — PAIN SCALES - GENERAL
PAINLEVEL_OUTOF10: 3
PAINLEVEL_OUTOF10: 7

## 2023-01-20 ASSESSMENT — PAIN DESCRIPTION - DESCRIPTORS: DESCRIPTORS: SPASM

## 2023-01-20 NOTE — PLAN OF CARE
Bedside swallow evaluation completed this date.     Paolo Brady M.S. [facility]  Speech-language pathologist  [phone]

## 2023-01-20 NOTE — CARE COORDINATION
Case Management Assessment  Initial Evaluation      Patient Name: Hanna Dobbs  YOB: 1950  Diagnosis: Macrocytic anemia [D53.9]  Hospital-acquired pneumonia [J18.9, Y95]  ESRD on dialysis ([facility] [N18.6, Z99.2]  Tracheostomy dependence ([facility] [Z93.0]  HCAP (healthcare-associated pneumonia) [J18.9]  Leukocytosis, unspecified type [D72.829]  Date / Time: 1/19/2023  2:16 PM    Admission status/Date: Inpatient 1/19/2023  Chart Reviewed: Yes      Patient Interviewed: Yes   Family Interviewed:  No      Hospitalization in the last 30 days:  No      Health Care Decision Maker :   Primary Decision Maker: Tom Laurentelisha [place] - [phone]     Who do you trust or have selected to make healthcare decisions for you      Met with: Patient at bedside. Interview conducted  (bedside/phone):    Current PCP: Noreen Lui MD     Financial  Commercial  Precert required for SNF : No unless skilled services needed at discharge          3 night stay required - N    ADLS  Support Systems/Care Needs: Children  Transportation:  per facility     Meal Preparation: per facility    Housing  Living Arrangements: LTC at [facility]  Steps: 0  Intent for return to present living arrangements: Yes  Identified Issues: None at this time.     Home Care Information  Active with Home Health Care : No Agency:(Services)     Passport/Waiver : No  :                      Phone Number:    Passport/Waiver Services: n/a           Durable Medical Equiptment   DME Provider: per facility  Equipment: per facility  Walker___Cane___RTS___ BSC___Shower Chair___Hospital Bed___W/C____Other________  02 at ____Liter(s)---wears(frequency)_______ [facility] ___ CPAP___ BiPap___   N/A____      Home O2 Use :  Yes    If No for home O2---if presently on O2 during hospitalization:  Yes  if yes CM to follow for potential DC O2 need  Informed of need for care provider to bring portable home O2 tank on day of discharge for nursing to connect prior to leaving:   Not Indicated  Verbalized agreement/Understanding:   Not Indicated    Community Service Affiliation  Dialysis:  Yes     Agency: [facility]  Location: [place]  Dialysis Schedule: T-Th-Sat  Phone: [phone]  Fax: Other Community Services: n/a     DISCHARGE PLAN: Explained Case Management role/services. CM reviewed chart and met with patient at bedside to discuss discharge needs and plan. [facility] Plans return at discharge. Spoke to [place] at [facility] who states +bed hold; no precert unless skilled services needed at discharge; will need rapid Covid for return.     Aisha Jimenez RN

## 2023-01-20 NOTE — PROGRESS NOTES
RT Inhaler-Nebulizer Bronchodilator Protocol Note    There is a bronchodilator order in the chart from a provider indicating to follow the RT Bronchodilator Protocol and there is an Initiate RT Inhaler-Nebulizer Bronchodilator Protocol order as well (see protocol at bottom of note). CXR Findings:  XR CHEST PORTABLE    Result Date: 1/19/2023  1. Right basilar airspace disease. This could reflect atelectasis versus pneumonia in the appropriate clinical setting. 2.  Mild cardiomegaly       The findings from the last RT Protocol Assessment were as follows:   History Pulmonary Disease: Smoker 15 pack years or more  Respiratory Pattern: Regular pattern and RR 12-20 bpm  Breath Sounds: Slightly diminished and/or crackles  Cough: Strong, spontaneous, non-productive  Indication for Bronchodilator Therapy: Decreased or absent breath sounds  Bronchodilator Assessment Score: 3    Aerosolized bronchodilator medication orders have been revised according to the RT Inhaler-Nebulizer Bronchodilator Protocol below. Respiratory Therapist to perform RT Therapy Protocol Assessment initially then follow the protocol. Repeat RT Therapy Protocol Assessment PRN for score 0-3 or on second treatment, BID, and PRN for scores above 3. No Indications - adjust the frequency to every 6 hours PRN wheezing or bronchospasm, if no treatments needed after 48 hours then discontinue using Per Protocol order mode. If indication present, adjust the RT bronchodilator orders based on the Bronchodilator Assessment Score as indicated below. Use Inhaler orders unless patient has one or more of the following: on home nebulizer, not able to hold breath for 10 seconds, is not alert and oriented, cannot activate and use MDI correctly, or respiratory rate 25 breaths per minute or more, then use the equivalent nebulizer order(s) with same Frequency and PRN reasons based on the score.   If a patient is on this medication at home then do not decrease Frequency below that used at home. 0-3 - enter or revise RT bronchodilator order(s) to equivalent RT Bronchodilator order with Frequency of every 4 hours PRN for wheezing or increased work of breathing using Per Protocol order mode. 4-6 - enter or revise RT Bronchodilator order(s) to two equivalent RT bronchodilator orders with one order with BID Frequency and one order with Frequency of every 4 hours PRN wheezing or increased work of breathing using Per Protocol order mode. 7-10 - enter or revise RT Bronchodilator order(s) to two equivalent RT bronchodilator orders with one order with TID Frequency and one order with Frequency of every 4 hours PRN wheezing or increased work of breathing using Per Protocol order mode. 11-13 - enter or revise RT Bronchodilator order(s) to one equivalent RT bronchodilator order with QID Frequency and an Albuterol order with Frequency of every 4 hours PRN wheezing or increased work of breathing using Per Protocol order mode. Greater than 13 - enter or revise RT Bronchodilator order(s) to one equivalent RT bronchodilator order with every 4 hours Frequency and an Albuterol order with Frequency of every 2 hours PRN wheezing or increased work of breathing using Per Protocol order mode.        Electronically signed by Allan Cabello RCP on 1/19/2023 at 9:46 PM

## 2023-01-20 NOTE — ED NOTES
Pt. Alert. Pt. Oriented to self only  Pt. Unsure of where she is and pt said the year is 65.      Elvira Medina RN  01/19/23 1934

## 2023-01-20 NOTE — H&P
Hospital Medicine History & Physical      PCP: Beny Koo MD    Date of Admission: 1/19/2023    Date of Service: Pt seen/examined on 1/20/2023     Chief Complaint:    Chief Complaint   Patient presents with    Other     WENT TO DIALYSIS TODAY, DIDN'T GET ANY FLUID OFF. POTASSIUM WAS 7. History Of Present Illness: The patient is a 71 y.o. female with pmhx of chronic hypoxic respiratory failure, hx of DVT,  CAD, atrial fibrillation anxiety, hx of CVA, chronic pain, ESRD on HD, SILVER, DM type 2, GERD,HLD who presented to [facility] ED with concern for abnormal labs. Patient went to dialysis yesterday, after dialysis her potassium was 7.0, she was sent to the ED. She does report they were unable to take any fluid off of her at dialysis. When asked about shortness of breath, she says \"I guess that's why im here\", mild cough. Denies fever, chills, chest pain, nausea, vomiting, diarrhea, hematuria, dysuria. Is complaining of rectal pain, has a history of hemorrhoids.      Past Medical History:        Diagnosis Date    Acute and chronic respiratory failure (acute-on-chronic) ([facility])     Acute blood loss anemia 7/1/2020    Acute deep vein thrombosis (DVT) of brachial vein of left upper extremity ([address] 2/20/2019    Formatting of this note might be different from the original. Dx February 2019    Anxiety disorder     Atherosclerotic heart disease of native coronary artery without angina pectoris     Bleeding hemorrhoids 6/29/2020    Cerebellar infarct ([address] 9/29/2019    Cerebral infarction ([facility])     Chronic pain syndrome     Dependence on renal dialysis ([address]     Dependence on respirator ([facility])     End stage renal disease ([address]     Gastro-esophageal reflux disease with esophagitis, without bleeding     Insomnia     Major depressive disorder, recurrent ([address]     Morbid obesity due to excess calories ([facility])     Muscle weakness     Obstructive sleep apnea (adult) (pediatric)     Other hyperlipidemia Other voice and resonance disorders     Personal history of COVID-19     Pulmonary hypertension [facility]     Tracheostomy status ([facility]     Type 2 diabetes mellitus without complications ([facility]     Unspecified atrial fibrillation [facility])        Past Surgical History:        Procedure Laterality Date    COLECTOMY N/A 5/10/2022    EXPLORATORY LAPAROTOMY, LYSIS OF ADHESIONS performed by Bolivar Good MD at [address] [place] [address]       Medications Prior to Admission:    Prior to Admission medications    Medication Sig Start Date End Date Taking? Authorizing Provider   gabapentin (NEURONTIN) 300 MG capsule Take 300 mg by mouth 3 times daily. Yes Historical Provider, MD   sennosides-docusate sodium (SENOKOT-S) 8.6-50 MG tablet Take 2 tablets by mouth at bedtime   Yes Historical Provider, MD   BISACODYL RE Place rectally daily as needed    Historical Provider, MD   Magnesium Hydroxide (DULCOLAX PO) Take 10 mg by mouth daily as needed    Historical Provider, MD   sodium phosphate (FLEET) 7-19 GM/118ML Place 1 enema rectally once as needed    Historical Provider, MD   LORazepam (ATIVAN) 0.5 MG tablet Take 0.5 mg by mouth every 12 hours as needed for Anxiety. Historical Provider, MD   oxyCODONE (ROXICODONE) 5 MG immediate release tablet Take 5 mg by mouth in the morning and at bedtime. Historical Provider, MD   UNABLE TO FIND Veltessa 8.4 gm by mouth one time a day every Monday, Wednesday, Friday, Sunday for hyperkalemia    Historical Provider, MD   atorvastatin (LIPITOR) 40 MG tablet Take 1 tablet by mouth nightly 10/28/22   Diane Goodell, MD   carboxymethylcellulose PF (THERATEARS) 1 % GEL ophthalmic gel Place 1 drop into both eyes every 4 hours as needed for Dry Eyes 10/28/22   Diane Goodell, MD   lidocaine (XYLOCAINE) 5 % ointment Apply topically as needed.  10/28/22   Diane Goodell, MD   apixaban (ELIQUIS) 2.5 MG TABS tablet Take 2.5 mg by mouth 2 times daily    Historical Provider, MD   loperamide (IMODIUM) 2 MG capsule Take 2 mg by mouth 4 times daily as needed for Diarrhea    Historical Provider, MD   promethazine (PHENERGAN) 12.5 MG tablet Take 12.5 mg by mouth every 6 hours as needed for Nausea    Historical Provider, MD   traZODone (DESYREL) 50 MG tablet Take 50 mg by mouth nightly    Historical Provider, MD   vitamin B-12 (CYANOCOBALAMIN) 500 MCG tablet Take 500 mcg by mouth daily    Historical Provider, MD   metoprolol tartrate (LOPRESSOR) 25 MG tablet Take 1 tablet by mouth 2 times daily 5/21/22   Opal Rojas MD   warfarin (COUMADIN) 3 MG tablet Take 1 tablet by mouth nightly  Patient not taking: Reported on 9/15/2022 4/30/22 9/19/22  Gianna Pascual MD   hydrocortisone (ANUSOL-HC) 2.5 % CREA rectal cream Place rectally 2 times daily 3/3/22   Meron Alexandra DO   calcium acetate 667 MG TABS Take 3 capsules by mouth 3 times daily (with meals)    Historical Provider, MD   hydrocortisone 1 % cream Apply topically 2 times daily Apply topically 2 times daily.     Historical Provider, MD   acetaminophen (TYLENOL) 325 MG tablet Take 650 mg by mouth every 6 hours as needed for Pain    Historical Provider, MD   diphenhydrAMINE (BENADRYL) 25 MG capsule Take 25 mg by mouth every 8 hours as needed     Historical Provider, MD   polyethylene glycol (GLYCOLAX) 17 g packet Take 17 g by mouth daily as needed for Constipation    Historical Provider, MD   ipratropium-albuterol (DUONEB) 0.5-2.5 (3) MG/3ML SOLN nebulizer solution Inhale 1 vial into the lungs every 4 hours    Historical Provider, MD   midodrine (PROAMATINE) 5 MG tablet Take 10 mg by mouth three times a week Tues thurs sat pre dialysis    Historical Provider, MD   omeprazole (PRILOSEC) 20 MG delayed release capsule Take 20 mg by mouth daily    Historical Provider, MD   chlorhexidine (PERIDEX) 0.12 % solution Take 15 mLs by mouth 2 times daily    Historical Provider, MD   sertraline (ZOLOFT) 50 MG tablet Take 50 mg by mouth daily    Historical Provider, MD   furosemide (LASIX) 20 MG tablet Take 20 mg by mouth three times a week [facility]  Patient not taking: Reported on 9/15/2022  9/19/22  Historical Provider, MD       Allergies:  Haloperidol lactate and Ziprasidone hcl    Social History:    TOBACCO:   reports that she has quit smoking. She has never used smokeless tobacco.  ETOH:   reports no history of alcohol use. Family History:   Positive as follows:    History reviewed. No pertinent family history. REVIEW OF SYSTEMS:       Constitutional: Negative for fever   HENT: Negative for sore throat   Eyes: Negative for redness   Respiratory: Negative  for dyspnea, cough   Cardiovascular: Negative for chest pain   Gastrointestinal: Negative for vomiting, diarrhea, +rectal pain   Genitourinary: Negative for hematuria   Musculoskeletal: Negative for arthralgias   Skin: Negative for rash   Neurological: Negative for syncope   Hematological: Negative for adenopathy   Psychiatric/Behavorial: Negative for anxiety    PHYSICAL EXAM:    /66   Pulse 88   Temp 97.8 °F (36.6 °C) (Oral)   Resp 20   Ht 5' 7\" (1.702 m)   Wt 295 lb (133.8 kg)   SpO2 90%   BMI 46.20 kg/m²     Gen: No distress. Alert. Pleasant female   Eyes: PERRL. No sclera icterus. No conjunctival injection. Neck: No JVD. No Carotid Bruit. Trachea midline.+tracheostomy   Resp: No accessory muscle use. No crackles. No wheezes. + diminished breath sounds right sided, +some mild rhonchi   CV: Regular rate. Regular rhythm. No murmur. No rub. No edema. Peripheral Pulses: +2 palpable, equal bilaterally   GI: Non-tender. Non-distended. No masses. No organomegaly. Normal bowel sounds. No hernia. Skin: Warm and dry. No nodule on exposed extremities. No rash on exposed extremities. +chronic right arm swelling, +fistula   M/S: No cyanosis. No joint deformity. No clubbing. Neuro: Awake. Grossly nonfocal    Psych: Oriented x 3. No anxiety or agitation.          Michele Valencia MD have reviewed the chart on [facility] and personally interviewed and examined patient, reviewed the data (labs and imaging) and after discussion with my PA formulated the plan. Agree with note with the following edits. HPI:     I reviewed the patient's Past Medical History, Past Surgical History, Medications, and Allergies. 71 y.o. female with pmhx of chronic hypoxic respiratory failure, hx of DVT,  CAD, atrial fibrillation anxiety, hx of CVA, chronic pain, ESRD on HD, SILVER, DM type 2, GERD,HLD who presented to [facility] ED with concern for abnormal labs. Patient went to dialysis yesterday, after dialysis her potassium was 7.0, she was sent to the ED. She does report they were unable to take any fluid off of her at dialysis. When asked about shortness of breath, she says \"I guess that's why im here\", mild cough. Denies fever, chills, chest pain, nausea, vomiting, diarrhea, hematuria, dysuria. Is complaining of rectal pain, has a history of hemorrhoids. General:  elderly AA female, obese  up in bed, Awake, alert and oriented. Appears to be not in any distress  Mucous Membranes:  Pink , anicteric  Tracheostomy status , able to speak in full sentences   Neck: No JVD, no carotid bruit, no thyromegaly  Chest:  Clear to auscultation bilaterally, no added sounds  Cardiovascular:  RRR S1S2 heard, no murmurs or gallops  Abdomen:  Soft, obese, undistended, non tender, no organomegaly, BS present  Extremities: right UE AV fistula, chronic atrophic ext   No edema or cyanosis.  Distal pulses well felt  Neurological : grossly normal , non ambulatory and bed bound      Lab Results   Component Value Date    WBC 13.9 (H) 01/20/2023    HGB 8.2 (L) 01/20/2023    HCT 25.5 (L) 01/20/2023    .3 (H) 01/20/2023     01/20/2023     Lab Results   Component Value Date     (L) 01/20/2023    K 5.1 01/20/2023    CL 96 (L) 01/20/2023    CO2 22 01/20/2023    BUN 63 (H) 01/20/2023    CREATININE 5.4 (HH) 01/20/2023 GLUCOSE 133 (H) 01/20/2023    CALCIUM 8.1 (L) 01/20/2023    PROT 8.4 (H) 01/19/2023    LABALBU 3.3 (L) 01/19/2023    BILITOT 0.7 01/19/2023    ALKPHOS 89 01/19/2023    AST 12 (L) 01/19/2023    ALT 11 01/19/2023    LABGLOM 8 (A) 01/20/2023    GFRAA 9 (A) 09/19/2022    AGRATIO 0.6 (L) 01/19/2023    GLOB 4.6 06/17/2021           CARDIAC ENZYMES  Recent Labs     01/19/23  1514 01/19/23  1656   TROPONINI 0.24* 0.20*         ABG    Lab Results   Component Value Date/Time    UWH7PTE 27.2 05/11/2022 06:20 AM    BEART 1.6 05/11/2022 06:20 AM    K3XZCRVA 99.3 05/11/2022 06:20 AM    PHART 7.365 05/11/2022 06:20 AM    HUG2ZXB 47.7 05/11/2022 06:20 AM    PO2ART 148.0 05/11/2022 06:20 AM    IPB5TBP 64.3 05/11/2022 06:20 AM       CULTURES  Blood cultures pending  COVID and Flu not detected     EKG: JAN-2023 14:16:28 [facility] ROUTINE RECORD  Sinus rhythm with 1st degree A-V block  Low voltage QRS  Cannot rule out Anterior infarct (cited on or before 19-JAN-2023)  Abnormal ECG  When compared with ECG of 27-OCT-2022 07:18,  No significant change was found  Confirmed by Leona Nathan MD, [address] ([id number]) on 1/19/2023 4:54:06 PM    RADIOLOGY  XR CHEST PORTABLE   Final Result   1. Right basilar airspace disease. This could reflect atelectasis versus   pneumonia in the appropriate clinical setting. 2.  Mild cardiomegaly               Pertinent previous results reviewed   Limited echocardiogram 10/27/2022  Summary  Limited only f/u for LVEF. LV systolic function is mildly reduced with a visually estimated EF of 45-50%. No obvious segmental wall motion abnormality.     ASSESSMENT/PLAN:    #Possible HCAP  #Chronic hypoxic respiratory failure with tracheostomy     -s/p tracheostomy over 1 year ago, on trach collar with 2 L O2 at baseline  -pt with hx of MDRO, MRSA, pseudomonas   -procalc, WBC elevated, no fevers, BP stable     -CXR as above with atelectasis vs pna   -COVID and Flu not detected   -Blood cultures pending   -on IV vancomycin and cefepime started  -pulmonology consulted     #ESRD on HD   #Hyperkalemia   -reportedly potassium of 7.0 after dialysis   -5.1 today  -dialysis T, Thurs, Sat schedule   -continue home meds   -nephrology consulted     #Elevated troponin   #CAD  -0.20, chronically elevated   -ASA, statin, BB   -at chem stress test during Sept admission 2022, cards recommended medical management   -no acute ischemic EKG changes  -no CP     #Hypotension   -on midodrine 3x weekly   -holding lopressor     #Chronic macrocyctic anemia   -Hgb slightly lower than baseline   -continue to monitor   -no s/s of acute bleeding     #Chronic diastolic HF   -last echo as above   -does not appear acutely decompensated     #Hyponatremia   -mild  -monitor     #DM type 2  -no home regimen   -BG stable today   -continue to monitor     #Atrial fibrillation   Hx of VTE   -NSR on presentation   -on eliquis for [facility]     #Hemorrhoids   -h prep     #Hx of CVA   -on eliquis  -ASA, statin       #Anxiety   #Depression   -ativan prn   -on zoloft     #GERD   -PPI     #Chronic pain   -on gabapentin, roxicodone    DVT Prophylaxis: eliquis   Diet: ADULT DIET;  Regular; Low Potassium (Less than 3000 mg/day)  Code Status: Full Code    NIKKO Wiley  01/20/23  9:32 AM    SLp and pulm eval   Agree with above  Changes made to note    Michelle Estevez MD,1/20/2023 10:34 AM

## 2023-01-20 NOTE — PROGRESS NOTES
AM assessment complete and morning meds administered. Patient changed and readjusted several times this AM at patient request. Patient awaiting visits from providers and is asking for medication for her \"\"burning feet. \"     Discussed plan of care with patient and answered all questions. Speech therapy in room for swallow eval, noted that trach balloon is deflated and does not to be inflated per speech.

## 2023-01-20 NOTE — CONSULTS
Patient is being seen at the request of Hailee Leonard MD  for a consultation for Pneumonia    HISTORY OF PRESENT ILLNESS:   71years old with history of CVA, CKD on HD chronic vent dependent presented with high potassium after dialysis. In ED potassium was normal at 4.5. Shortness of breath and chest x-ray showed pulmonary infiltrates. Cough. Leukocytosis on the labs. Patient is lethargic uncooperative noncommunicative unable to obtain further HPI.     PAST MEDICAL HISTORY:  Past Medical History:   Diagnosis Date    Acute and chronic respiratory failure (acute-on-chronic) ([facility])     Acute blood loss anemia 7/1/2020    Acute deep vein thrombosis (DVT) of brachial vein of left upper extremity ([address] 2/20/2019    Formatting of this note might be different from the original. Dx February 2019    Anxiety disorder     Atherosclerotic heart disease of native coronary artery without angina pectoris     Bleeding hemorrhoids 6/29/2020    Cerebellar infarct ([address] 9/29/2019    Cerebral infarction ([facility])     Chronic pain syndrome     Dependence on renal dialysis ([address]     Dependence on respirator ([facility])     End stage renal disease ([address]     Gastro-esophageal reflux disease with esophagitis, without bleeding     Insomnia     Major depressive disorder, recurrent ([address]     Morbid obesity due to excess calories ([facility])     Muscle weakness     Obstructive sleep apnea (adult) (pediatric)     Other hyperlipidemia     Other voice and resonance disorders     Personal history of COVID-19     Pulmonary hypertension ([address]     Tracheostomy status ([address]     Type 2 diabetes mellitus without complications ([address]     Unspecified atrial fibrillation ([address]      PAST SURGICAL HISTORY:  Past Surgical History:   Procedure Laterality Date    COLECTOMY N/A 5/10/2022    EXPLORATORY LAPAROTOMY, LYSIS OF ADHESIONS performed by Andree Molina MD at [address]  Lethargic uncooperative noncommunicative unable to obtain    SOCIAL HISTORY:   reports that she has quit smoking. She has never used smokeless tobacco.    Scheduled Meds:   midodrine  10 mg Oral TID WC    cefepime  1,000 mg IntraVENous Q24H    apixaban  2.5 mg Oral BID    atorvastatin  40 mg Oral Nightly    hydrocortisone   Rectal BID    sodium chloride flush  5-40 mL IntraVENous 2 times per day    vancomycin (VANCOCIN) intermittent dosing (placeholder)   Other RX Placeholder    ipratropium-albuterol  1 ampule Inhalation 4x daily     Continuous Infusions:   sodium chloride       PRN Meds:  LORazepam, sodium chloride flush, sodium chloride, ondansetron **OR** ondansetron, polyethylene glycol, acetaminophen **OR** acetaminophen, ipratropium-albuterol    ALLERGIES:  Patient is allergic to haloperidol lactate and ziprasidone hcl. REVIEW OF SYSTEMS: Lethargic noncommunicative uncooperative not able to obtain      PHYSICAL EXAM:  Blood pressure 101/60, pulse 72, temperature 98.5 °F (36.9 °C), temperature source Axillary, resp. rate 22, height 5' 7\" (1.702 m), weight 295 lb (133.8 kg), SpO2 91 %.' on 2 L through trach collar  Gen: + Distress. Ill-appearing  Eyes: PERRL. No sclera icterus. No conjunctival injection. ENT: No discharge. Pharynx clear. Neck: Trachea midline. No obvious mass. Resp: Mild accessory muscle use. Few crackles. No wheezes. No rhonchi. No dullness on percussion. CV: Regular rate. Regular rhythm. No murmur or rub.  1-2+ LE edema. GI: Non-tender. Non-distended. No hernia. Skin: Warm and dry. No nodule on exposed extremities. Lymph: No cervical LAD. No supraclavicular LAD. M/S: No cyanosis. No joint deformity. No clubbing. Right arm fistula  Neuro: Lethargic. Wakes up to painful stimuli not following commands. Psych: Oriented x1. No anxiety.      LABS:  CBC:   Recent Labs     01/19/23  1514 01/20/23  0451   WBC 13.6* 13.9*   HGB 9.1* 8.2*   HCT 29.0* 25.5*   .8* 101.3*    168     BMP:   Recent Labs     01/19/23  1514 01/20/23  0456 * 132*   K 4.6 5.1   CL 91* 96*   CO2 28 22   BUN 55* 63*   CREATININE 5.4* 5.4*     LIVER PROFILE:   Recent Labs     01/19/23  1514   AST 12*   ALT 11   BILITOT 0.7   ALKPHOS 89     PT/INR: No results for input(s): PROTIME, INR in the last 72 hours. APTT: No results for input(s): APTT in the last 72 hours. UA:No results for input(s): NITRITE, COLORU, PHUR, LABCAST, WBCUA, RBCUA, MUCUS, TRICHOMONAS, YEAST, BACTERIA, CLARITYU, SPECGRAV, LEUKOCYTESUR, UROBILINOGEN, BILIRUBINUR, BLOODU, GLUCOSEU, AMORPHOUS in the last 72 hours. Invalid input(s): KETONESU  No results for input(s): PHART, POH6MTK, PO2ART in the last 72 hours.     Chest x-ray 1/19 imaging was reviewed by me and showed   Right basilar airspace disease  Mild cardiomegaly    ASSESSMENT:  Acute encephalopathy/metabolic encephalopathy-suspecting hypercapnia  Healthcare associated pneumonia - probable gram negative, risk for methicillin resistant Staph aureus as well  Acute encephalopathy/metabolic encephalopathy-suspecting hypercapnia  Chronic tracheostomy -history of chronic respiratory failure requiring continuous mechanical ventilation through tracheostomy  ESRD on HD  Systolic CHF EF 33%  History of SILVER and pulmonary hypertension  Prior history of Proteus, Acinetobacter, Pseudomonas pneumonia  History of A. fib, VTE and CVA on Eliquis    PLAN:  Supplemental oxygen to maintain SaO2 >92%; wean as tolerated  Stat ABG-might require mechanical ventilation and ICU transfer  IV antibiotics to include vancomycin and cefepime  Monitor vancomycin level for toxicity  Blood culture and tracheal aspirate for gram stain and culture  We will consider bronchoscopy given prior history of MDR pathogens  Speech pathology evaluation  I recommend CXR in 4-6 week to documents resolution of pulmonary infitrates  HD per nephrology  On Eliquis

## 2023-01-20 NOTE — PROGRESS NOTES
RT Inhaler-Nebulizer Bronchodilator Protocol Note    There is a bronchodilator order in the chart from a provider indicating to follow the RT Bronchodilator Protocol and there is an Initiate RT Inhaler-Nebulizer Bronchodilator Protocol order as well (see protocol at bottom of note). CXR Findings:  XR CHEST PORTABLE    Result Date: 1/19/2023  1. Right basilar airspace disease. This could reflect atelectasis versus pneumonia in the appropriate clinical setting. 2.  Mild cardiomegaly       The findings from the last RT Protocol Assessment were as follows:   History Pulmonary Disease: (P) Smoker 15 pack years or more  Respiratory Pattern: (P) Regular pattern and RR 12-20 bpm  Breath Sounds: (P) Slightly diminished and/or crackles  Cough: (P) Strong, spontaneous, non-productive  Indication for Bronchodilator Therapy: (P) Decreased or absent breath sounds  Bronchodilator Assessment Score: (P) 3    Aerosolized bronchodilator medication orders have been revised according to the RT Inhaler-Nebulizer Bronchodilator Protocol below. Respiratory Therapist to perform RT Therapy Protocol Assessment initially then follow the protocol. Repeat RT Therapy Protocol Assessment PRN for score 0-3 or on second treatment, BID, and PRN for scores above 3. No Indications - adjust the frequency to every 6 hours PRN wheezing or bronchospasm, if no treatments needed after 48 hours then discontinue using Per Protocol order mode. If indication present, adjust the RT bronchodilator orders based on the Bronchodilator Assessment Score as indicated below. Use Inhaler orders unless patient has one or more of the following: on home nebulizer, not able to hold breath for 10 seconds, is not alert and oriented, cannot activate and use MDI correctly, or respiratory rate 25 breaths per minute or more, then use the equivalent nebulizer order(s) with same Frequency and PRN reasons based on the score.   If a patient is on this medication at home then do not decrease Frequency below that used at home. 0-3 - enter or revise RT bronchodilator order(s) to equivalent RT Bronchodilator order with Frequency of every 4 hours PRN for wheezing or increased work of breathing using Per Protocol order mode. 4-6 - enter or revise RT Bronchodilator order(s) to two equivalent RT bronchodilator orders with one order with BID Frequency and one order with Frequency of every 4 hours PRN wheezing or increased work of breathing using Per Protocol order mode. 7-10 - enter or revise RT Bronchodilator order(s) to two equivalent RT bronchodilator orders with one order with TID Frequency and one order with Frequency of every 4 hours PRN wheezing or increased work of breathing using Per Protocol order mode. 11-13 - enter or revise RT Bronchodilator order(s) to one equivalent RT bronchodilator order with QID Frequency and an Albuterol order with Frequency of every 4 hours PRN wheezing or increased work of breathing using Per Protocol order mode. Greater than 13 - enter or revise RT Bronchodilator order(s) to one equivalent RT bronchodilator order with every 4 hours Frequency and an Albuterol order with Frequency of every 2 hours PRN wheezing or increased work of breathing using Per Protocol order mode. RT to enter RT Home Evaluation for COPD & MDI Assessment order using Per Protocol order mode.     Electronically signed by Helen Yang RCP on 1/20/2023 at 8:36 AM

## 2023-01-20 NOTE — CONSULTS
[facility] Pharmacokinetic Monitoring Service - Neville Rubio is a 71 y.o. female starting on vancomycin therapy for Pneumonia (HAP) x 7 days. Pharmacy consulted by Dr Magalys Donahue for monitoring and adjustment. Patient on dialysis. Additional Antimicrobials: Cefepime IV    Pertinent Laboratory Values: Wt Readings from Last 1 Encounters:   01/19/23 (!) 360 lb (163.3 kg)     Temp Readings from Last 1 Encounters:   01/19/23 97.6 °F (36.4 °C) (Oral)     Estimated Creatinine Clearance: 15 mL/min (A) (based on SCr of 5.4 mg/dL [facility])). Recent Labs     01/19/23  1514   CREATININE 5.4*   WBC 13.6*       MRSA Nasal Swab: not ordered. Order placed by pharmacy. Plan:  Patient received vancomycin 1500 mg IV x 1 dose in the ED at 4630 today. Based on patient's renal function (hemodialysis), will pulse dose vancomycin. Vancomycin random level ordered for tomorrow morning @ 0600. Will pulse dose vancomycin based on random level result. Pharmacy will continue to monitor patient and adjust therapy as indicated    Thank you for the consult.

## 2023-01-20 NOTE — PROGRESS NOTES
Returned call to Vicky, poppy, and updated plan of care. All questions asked and answered per daughter.

## 2023-01-20 NOTE — PROGRESS NOTES
Patient admitted to room 312 from ED. Patient oriented to room, call light, bed rails, phone, lights and bathroom. Patient instructed about the schedule of the day including: vital sign frequency, lab draws, possible tests, frequency of MD and staff rounds, daily weights, I &O's and prescribed diet. Telemetry box in place, patient aware of placement and reason. Bed locked, in lowest position, side rails up 2/4, call light within reach. Recliner Assessment  Patient is not able to demonstrate the ability to move from a reclining position to an upright position within the recliner due to being bedbound. 4 Eyes Skin Assessment     The patient is being assess for   Admission    I agree that 2 RN's have performed a thorough Head to Toe Skin Assessment on the patient. ALL assessment sites listed below have been assessed. Areas assessed for pressure by both nurses:   [x]   Head, Face, and Ears   [x]   Shoulders, Back, and Chest, Abdomen  [x]   Arms, Elbows, and Hands   [x]   Coccyx, Sacrum, and Ischium  [x]   Legs, Feet, and Heels        Skin Assessed Under all Medical Devices by both nurses:  O2 device tubing              All Mepilex Borders were peeled back and area peeked at by both nurses:  No: n/a  Please list where Mepilex Borders are located:  n/a             **SHARE this note so that the co-signing nurse is able to place an eSignature**    Co-signer eSignature: Electronically signed by Julia Branham RN on 1/20/23 at 2:56 AM EST    Does the Patient have Skin Breakdown related to pressure?   No     (Insert Photo here)         Stu Prevention initiated:  Yes   Wound Care Orders initiated:  No      [facility] nurse consulted for Pressure Injury (Stage 3,4, Unstageable, DTI, NWPT, Complex wounds)and New or Established Ostomies:  No      Primary Nurse eSignature: Electronically signed by Eduardo Caldera RN on 1/20/23 at 2:14 AM EST Nostril Rim Text: The closure involved the nostril rim.

## 2023-01-20 NOTE — ED NOTES
Writer introduced self to pt at this time. Pt. Is alert but confused, when speaking with provider, Dr. Eron Mejia states the pt has been this way since she arrived. Pt. Reoriented to place and situation. Pt. Requested writer places call to daughter. Call to [facility] at this time as requested, [facility] states she is going to visit tomorrow and given an update on pt condition at this time. Pt. Informed of conversation and has no other known needs.      Tyler Rivera RN  01/19/23 1924

## 2023-01-20 NOTE — FLOWSHEET NOTE
01/20/23 0523   Vital Signs   Temp 98.5 °F (36.9 °C)   Temp Source Axillary   Heart Rate 72   Heart Rate Source Monitor   Resp 22   /60   MAP (Calculated) 74   BP Location Left leg   Level of Consciousness 0   MEWS Score 2   Oxygen Therapy   SpO2 91 %   Pulse Oximeter Device Mode Intermittent   Pulse Oximeter Device Location Finger   O2 Device Blow-by   O2 Flow Rate (L/min) 2 L/min     Pt awake in bed. VS as shown above. Pt denies any further needs at this time. Call light in reach and bed in lowest position.

## 2023-01-20 NOTE — PROGRESS NOTES
Vanc-Day 2  Vanc random 7.6 today, HD- Tu. Th and Sat  Will dose Vanc tomorrow after HD  No vanc today  Juan AVILA 1/20/202312:15 PM  .

## 2023-01-20 NOTE — PROGRESS NOTES
Speech Language Pathology    Speech Language Pathology  Clinical Bedside Swallow Assessment  Facility/Department: [facility] PCU TELEMETRY    Recommendations: Instrumentation: will continue to monitor; consider completion of FEES in the future to correlate clinical findings  Diet recommendation: IDDSI 7 Regular Solids; IDDSI 0 Thin Liquids; Meds PO as tolerated  Risk management: upright for all intake, stay upright for at least 30 mins after intake, small bites/sips, close supervision with intake, oral care 2-3x/day to reduce adverse affects in the event of aspiration, alternate bites/sips, slow rate of intake, general GERD precautions, general aspiration precautions, and hold PO and contact SLP if s/s of aspiration or worsening respiratory status develop. Pt is impulsive with PO intake at times -- recommend close supervision with meals and cues for use of compensatory swallow strategies (I.e. slow rate, small bites/sips). NAME:Jennifer Guo  : 1950 (71 y.o.)   MRN: [id number]  ROOM: [place] [address]  ADMISSION DATE: 2023  PATIENT DIAGNOSIS(ES): Macrocytic anemia [D53.9]  Hospital-acquired pneumonia [J18.9, Y95]  ESRD on dialysis ([address] [N18.6, Z99.2]  Tracheostomy dependence ([address] [Z93.0]  HCAP (healthcare-associated pneumonia) [J18.9]  Leukocytosis, unspecified type [D72.829]  Chief Complaint   Patient presents with    Other     WENT TO DIALYSIS TODAY, DIDN'T GET ANY FLUID OFF. POTASSIUM WAS 7.       Patient Active Problem List    Diagnosis Date Noted    Tracheostomy status ([address] 10/27/2022    Coronary artery disease involving native heart with unstable angina pectoris ([address] 2022    Mixed hyperlipidemia 2022    ESRD on hemodialysis ([address] 2022    Chest pain 09/15/2022    Fecal impaction ([address]     SBO (small bowel obstruction) ([address] 2022    Vaginal bleeding 2022    Abnormal chest x-ray     HCAP (healthcare-associated pneumonia)     Unresponsive episode     Syncope Acute respiratory failure with hypoxia and hypercapnia (HCC)     Pneumonia due to infectious organism     ESRD on dialysis [facility])     Chronic respiratory failure requiring continuous mechanical ventilation through tracheostomy ([address]     Acute on chronic respiratory failure with hypoxia (HCC)     Acute pulmonary edema (HCC)     Rectal pain     History of CVA (cerebrovascular accident)     Anxiety 10/10/2021    GERD (gastroesophageal reflux disease) 10/10/2021    Morbid obesity with BMI of 50.0-59.9, adult ([address] 10/10/2021    Anemia, chronic disease 02/08/2021    SILVER (obstructive sleep apnea) 01/31/2021    Dysphagia, pharyngeal phase 12/26/2020    Chronic diastolic heart failure ([address] 10/23/2018    Primary hypertension 08/03/2017     Past Medical History:   Diagnosis Date    Acute and chronic respiratory failure (acute-on-chronic) (HCC)     Acute blood loss anemia 7/1/2020    Acute deep vein thrombosis (DVT) of brachial vein of left upper extremity ([address] 2/20/2019    Formatting of this note might be different from the original. Dx February 2019    Anxiety disorder     Atherosclerotic heart disease of native coronary artery without angina pectoris     Bleeding hemorrhoids 6/29/2020    Cerebellar infarct ([address] 9/29/2019    Cerebral infarction (HCC)     Chronic pain syndrome     Dependence on renal dialysis ([address]     Dependence on respirator (HCC)     End stage renal disease ([address]     Gastro-esophageal reflux disease with esophagitis, without bleeding     Insomnia     Major depressive disorder, recurrent ([address]     Morbid obesity due to excess calories (HCC)     Muscle weakness     Obstructive sleep apnea (adult) (pediatric)     Other hyperlipidemia     Other voice and resonance disorders     Personal history of COVID-19     Pulmonary hypertension ([address]     Tracheostomy status ([address]     Type 2 diabetes mellitus without complications ([address]     Unspecified atrial fibrillation ([address]      Past Surgical History:   Procedure Laterality Date    COLECTOMY N/A 5/10/2022    EXPLORATORY LAPAROTOMY, LYSIS OF ADHESIONS performed by Shaun Maravilla MD at [facility]   Allergen Reactions    Haloperidol Lactate Other (See Comments)     PT DOESN'T REMEMBER      Ziprasidone Hcl Other (See Comments)     PT DOESN'T REMEMBER         DATE ONSET: Pt admitted to [facility] on 1/19/23    Date of Evaluation: 1/20/2023   Evaluating Therapist: Nigel Barraza SLP    Chart Reviewed: : [x] Yes [] No    Current Diet: ADULT DIET; Regular; Low Potassium (Less than 3000 mg/day)    Recent Chest Radiography: [x] Chest XR   [] CT of Chest  Date: 1/19/23  Impressions  Impression   1. Right basilar airspace disease. This could reflect atelectasis versus   pneumonia in the appropriate clinical setting. 2.  Mild cardiomegaly     Pain: The patient does not complain of pain    Reason for Referral  Viridiana Sanders was referred for a bedside swallow evaluation to assess the efficiency of their swallow function, identify signs and symptoms of aspiration and make recommendations regarding safe dietary consistencies, effective compensatory strategies, and safe eating environment. Assessment    Medical record review/interview: Per MD H&P: \"This is a 71 y.o. female with a PMH significant for CVA, diabetes, CKD on renal dialysis  presented today for abnormal lab. Patient went to dialysis today receiving treatment but sent here because her potassium was 7. Patient reports that she has been compliant with her dialysis. She goes every Tuesday, Thursday, Saturday. She reports ongoing dry cough with some mild shortness of breath. She denies any chest pain or fever. Denies any nausea, vomiting, abdominal pain, changes in bowel, bloody stool, syncopal episode or new numbness or tingling. Denies any new medications\". Patient Complaints: *pt stated that \"I just need to go slow\" with PO intake.   She reported that she is on a regular solid diet with thin liquids at [facility] (confirmed by pt's hard chart). Odynophagia: [] Yes [x] No  Globus Sensation: [] Yes [x] No  SOB with PO intake: [] Yes [x] No  Increased WOB with PO intake: [] Yes [x] No  Reflux Sx's: [] Yes [x] No  Weight loss: [] Yes [x] No  Coughing/Choking with PO intake: [] Yes [x] No  Reduced Appetite: [] Yes [x] No  Trouble with Mastication:  [] Yes [x] No  Avoidance of Certain Foods:  [] Yes [x] No  Premature Satiety:  [] Yes [x] No  Recent PNA:  [] Yes [x] No  Recurring PNA:  [] Yes [x] No  Changes in vocal quality: [] Yes [x] No    Baseline Method of Oral Meds:  [x] Whole with liquid    [] Cut with liquid    [] Whole with puree    [] Cut in puree    [] Crushed in puree    [] Crushed in liquid    [] Via TF    Additional Reported Symptoms/Complaints: Pt admitted d/t HAP, ESRD, leukocytosis, macrocytic anemia. Pt has PMHx of acute on chronic respiratory failure, trach dependent, GERD, CVA, and SILVER per chart. Pt [facility] by [facility] most recently on 2/21/22 with recommendations for a soft and bite sized diet with thin liquids, PO intake *only when cuff deflated. Pt s/p tracheotomy in February 2021 per chart and PEG placement in January 2021.         Predisposing dysphagia risk factors: Hx of CVA, Chronic Respiratory Failure, Cognitive Deficit, Trach dependent, GERD, Hx of PEG, and Hx of dysphagia  Clinical signs of possible chronic dysphagia: hx of PEG/TF and hx of dysphagia  Precipitating dysphagia risk factors: reduced physical mobility    Vitals/labs:     Vitals:    01/20/23 0008 01/20/23 0523 01/20/23 0755 01/20/23 0835   BP:  101/60 133/66    Pulse:  72 82 88   Resp: 22 22 20 20   Temp:  98.5 °F (36.9 °C) 97.8 °F (36.6 °C)    TempSrc:  Axillary Oral    SpO2: 98% 91% 94% 90%   Weight:       Height:            CBC:   Recent Labs     01/20/23  0451   WBC 13.9*   HGB 8.2*         BMP:  Recent Labs     01/20/23  0451   *   K 5.1   CL 96*   CO2 22   BUN 63*   CREATININE 5.4*   GLUCOSE 133* Cranial nerve exam:   CN V (trigeminal): ophthalmic, maxillary, and mandibular facial sensation- WFL  CN VII (facial): WFL  CN IX/X (glossopharyngeal/vagus): MPT: DNT; pitch range: Adequate; vocal quality: Adequate; cough: Strong-perceptually, Non-Productive, and Dry  CN XII (hypoglossal): WFL    Laryngeal function exam:   Secretions: n/a  Vocal quality: See CN exam above  MPT: See CN exam above  S/Z ratio: DNT  Pitch range: See CN exam above  Cough: See CN exam above    Oral Care Status:    [] Oral Care [facility]  [] Poor oral care status  [] Edentulous  [] Upper Dentures  [] Lower Dentures  [x] Missing/Broken Teeth  [] Evidence of dental cavities/carries    PO trials:     IDDSI 0 (thin):   - Straw: no anterior bolus loss , swallow timing subjectively appears timely, no clinical s/s of aspiration, and vitals stable    IDDSI 7 (regular): no anterior bolus loss , suspect functional A-P bolus transit, grossly functional mastication, oral clearance grossly WFL, no clinical s/s of aspiration, and vitals stable    3 oz water: DNT    Impressions:  Pt is trach dependent-- cuffed, balloon deflated. Pt with trach mask in place with 2 lpm O2. She was pleasantly confused, unable to answer questions initial trach placement, current medications, etc. for MD when present. She denied dysphagia at baseline stating only that \"I need to eat slow\". No clinical s/s of aspiration observed with PO trials. RR consistent at 21-24/min and O2 sats 100% throughout BSE. Pt somewhat impulsive and benefited from cues from SLP for slow rate with PO intake, small bites/sips. Consider completion of instrumental swallow study via FEES in the future to correlate clinical findings. Continue current diet. RN notified of recs. ST to continue to follow.     Clinical signs of oropharyngeal dysphagia likely acute-on-chronic related to hx of dysphagia, reduced physical mobility, respiratory illness, acute infection, generalized weakness, Progressive nature of disease/condition, impaired respiratory-swallow coordination, the aging swallow, and co-morbidities. Swallow prognosis is good. Instrumental swallow study is not indicated at this time, will continue to monitor. Given tolerance to PO at bedside, lack of clinical s/s of aspiration at bedside, stable respiratory status, good oral care status, and willingness to participate in therapy, pt is safe for oral diet at this time    Recommendations: Instrumentation: will continue to monitor; consider completion of FEES in the future to correlate clinical findings  Diet recommendation: IDDSI 7 Regular Solids; IDDSI 0 Thin Liquids; Meds PO as tolerated  Risk management: upright for all intake, stay upright for at least 30 mins after intake, small bites/sips, distant supervision with intake, oral care 2-3x/day to reduce adverse affects in the event of aspiration, alternate bites/sips, slow rate of intake, general GERD precautions, general aspiration precautions, and hold PO and contact SLP if s/s of aspiration or worsening respiratory status develop.     Prognosis: Good    Recommended Intervention:  [x] Dysphagia tx  [] Videostroboscopy                      [] NPO   [] MBS       [] Speech/Cog Eval    [] Therapeutic PO Trials     [] Ice Chips   [] Other:  [x] FEES                                                 Dysphagia Therapeutic Intervention:  []  Bolus control Exercises  []  Oral Motor Exercises  []  [place] Protocol  []  Thermal Stimulation  [x]  Oral Care    []  Vital Stim/NMES  []  Laryngeal Exercises  [x]  Patient/Family Education  []  Pharyngeal Exercises  []  Therapeutic PO trials with SLP  [x]  Diet tolerance monitoring  []  Other:     Referrals:  [] ENT    [] PT  [] Pulmonology [] GI  [] Neurology  [] RD  [] OT   []     Goals:  Short Term Goals:  Timeframe for Short Term Goals: (5 days,1/25/23)  Goal 1: The patient will tolerate recommended diet with no clinical s/s of aspiration 5/5  Goal 2: The patient/caregiver will demonstrate understanding of compensatory swallow strategies, for improved swallow safety  Goal 3: The patient will tolerate instrumental assessment when able     Long Term Goals:   Timeframe for Long Term Goals: (7 days, 1/27/23)  Goal 1: The patient will tolerate least restrictive diet with no clinical s/s of aspiration or worsening respiratory/pulmonary status    Treatment:  Skilled instruction completed with patient re: evidenced based practice regarding recommendations and POC, importance of oral care to reduce adverse affects in the event of aspiration, and instruction of recommended compensatory strategies developed based upon clinical exam. Pt able to recall/demonstrate compensatory strategies with min cues. Pt Education: SLP educated the patient re: Role of SLP, rationale for completion of assessment, results of assessment, recommendations, and POC  Pt Education Response: verbalized understanding, would benefit from ongoing education, and RN aware    Duration/Frequency of Tx: 3x/week for LOS    Individuals Consulted:   [x]  Patient     []  NP         [x]  RN   []  RD                   [x]  MD      []  Family Member                        []  PA    []  Other:      Safety Devices / Report:  [x]  All fall risk precautions in place [x]  Safety handoff completed with RN  [x]  Bed alarm in place  [x]  Left in bed     []  Chair alarm in place  []  Left in chair   [x]  Call light in reach   []  Other:             Total Treatment Time / Charges       Time in Time out Total Time / units   Swallow Eval/Tx Time  0838 0910 26 min / 2 units (DE/DT)       Signature:  CRISTIAN Zaldivar  Speech-language pathologist  [phone]

## 2023-01-20 NOTE — ACP (ADVANCE CARE PLANNING)
Advance Care Planning     General Advance Care Planning (ACP) Conversation    Date of Conversation: 1/19/2023  Conducted with: Patient with Decision Making Capacity    Healthcare Decision Maker:    Primary Decision Maker: Claus Granda - Christy - [phone]  Click here to complete [address] including selection of the Healthcare Decision Maker Relationship (ie \"Primary\"). Today we documented Decision Maker(s) consistent with Legal Next of Kin hierarchy.     Content/Action Overview:  Has NO ACP documents/care preferences - information provided, considering goals and options  Reviewed DNR/DNI and patient elects Full Code (Attempt Resuscitation)        Length of Voluntary ACP Conversation in minutes:  <16 minutes (Non-Billable)    Luis Miguel Sesay RN

## 2023-01-20 NOTE — CONSULTS
Nephrology Consult Note  [id number] [facility]        Reason for Consult: ESKD    Outpatient HD unit: [facility]  Days of dialysis: TThS. Last HD was on 1/19/23. Duration of each treatment: 225mins  Vascular access: RUE AVF  EDW: 122.5kg    HISTORY OF PRESENT ILLNESS:      The patientis a 71 y.o. female with significant past medical history of chronic respiratory failure S/P trache, CVA, morbid obesity, DM and afib who presents with abnormal labs. Noted to have elevated K+ 6.8-7.2meq/L from 1/17 and 1/19. On admission, labs showed a K+ of 4.6meq/L. Also with elevated WBC and is now being treated for possible HCAP.         Past Medical History:        Diagnosis Date    Acute and chronic respiratory failure (acute-on-chronic) (HCC)     Acute blood loss anemia 7/1/2020    Acute deep vein thrombosis (DVT) of brachial vein of left upper extremity ([address] 2/20/2019    Formatting of this note might be different from the original. Dx February 2019    Anxiety disorder     Atherosclerotic heart disease of native coronary artery without angina pectoris     Bleeding hemorrhoids 6/29/2020    Cerebellar infarct ([address] 9/29/2019    Cerebral infarction (HCC)     Chronic pain syndrome     Dependence on renal dialysis ([address]     Dependence on respirator (HCC)     End stage renal disease ([address]     Gastro-esophageal reflux disease with esophagitis, without bleeding     Insomnia     Major depressive disorder, recurrent ([address]     Morbid obesity due to excess calories ([facility])     Muscle weakness     Obstructive sleep apnea (adult) (pediatric)     Other hyperlipidemia     Other voice and resonance disorders     Personal history of COVID-19     Pulmonary hypertension ([address]     Tracheostomy status ([address]     Type 2 diabetes mellitus without complications ([address]     Unspecified atrial fibrillation ([address]        Past Surgical History:        Procedure Laterality Date    COLECTOMY N/A 5/10/2022    EXPLORATORY LAPAROTOMY, LYSIS OF ADHESIONS performed by Karen Mendoza MD at [address] [place] [address]       Current Medications:    No current facility-administered medications on file prior to encounter. Current Outpatient Medications on File Prior to Encounter   Medication Sig Dispense Refill    gabapentin (NEURONTIN) 300 MG capsule Take 300 mg by mouth 3 times daily. sennosides-docusate sodium (SENOKOT-S) 8.6-50 MG tablet Take 2 tablets by mouth at bedtime      BISACODYL RE Place rectally daily as needed      Magnesium Hydroxide (DULCOLAX PO) Take 10 mg by mouth daily as needed      sodium phosphate (FLEET) 7-19 GM/118ML Place 1 enema rectally once as needed      LORazepam (ATIVAN) 0.5 MG tablet Take 0.5 mg by mouth every 12 hours as needed for Anxiety. oxyCODONE (ROXICODONE) 5 MG immediate release tablet Take 5 mg by mouth in the morning and at bedtime. UNABLE TO FIND Veltessa 8.4 gm by mouth one time a day every Monday, Wednesday, Friday, Sunday for hyperkalemia      atorvastatin (LIPITOR) 40 MG tablet Take 1 tablet by mouth nightly 30 tablet 3    carboxymethylcellulose PF (THERATEARS) 1 % GEL ophthalmic gel Place 1 drop into both eyes every 4 hours as needed for Dry Eyes      lidocaine (XYLOCAINE) 5 % ointment Apply topically as needed.       apixaban (ELIQUIS) 2.5 MG TABS tablet Take 2.5 mg by mouth 2 times daily      loperamide (IMODIUM) 2 MG capsule Take 2 mg by mouth 4 times daily as needed for Diarrhea      promethazine (PHENERGAN) 12.5 MG tablet Take 12.5 mg by mouth every 6 hours as needed for Nausea      traZODone (DESYREL) 50 MG tablet Take 50 mg by mouth nightly      vitamin B-12 (CYANOCOBALAMIN) 500 MCG tablet Take 500 mcg by mouth daily      metoprolol tartrate (LOPRESSOR) 25 MG tablet Take 1 tablet by mouth 2 times daily 60 tablet 3    [DISCONTINUED] warfarin (COUMADIN) 3 MG tablet Take 1 tablet by mouth nightly (Patient not taking: Reported on 9/15/2022) 30 tablet 2    hydrocortisone (ANUSOL-HC) 2.5 % CREA rectal cream Place rectally 2 times daily 1 each 0    calcium acetate 667 MG TABS Take 3 capsules by mouth 3 times daily (with meals)      hydrocortisone 1 % cream Apply topically 2 times daily Apply topically 2 times daily.       acetaminophen (TYLENOL) 325 MG tablet Take 650 mg by mouth every 6 hours as needed for Pain      diphenhydrAMINE (BENADRYL) 25 MG capsule Take 25 mg by mouth every 8 hours as needed       polyethylene glycol (GLYCOLAX) 17 g packet Take 17 g by mouth daily as needed for Constipation      ipratropium-albuterol (DUONEB) 0.5-2.5 (3) MG/3ML SOLN nebulizer solution Inhale 1 vial into the lungs every 4 hours      midodrine (PROAMATINE) 5 MG tablet Take 10 mg by mouth three times a week Tues thurs sat pre dialysis      omeprazole (PRILOSEC) 20 MG delayed release capsule Take 20 mg by mouth daily      chlorhexidine (PERIDEX) 0.12 % solution Take 15 mLs by mouth 2 times daily      sertraline (ZOLOFT) 50 MG tablet Take 50 mg by mouth daily      [DISCONTINUED] furosemide (LASIX) 20 MG tablet Take 20 mg by mouth three times a week MWF (Patient not taking: Reported on 9/15/2022)         Allergies:  Haloperidol lactate and Ziprasidone hcl    Social History:    Social History     Socioeconomic History    Marital status:      Spouse name: Not on file    Number of children: Not on file    Years of education: Not on file    Highest education level: Not on file   Occupational History    Not on file   Tobacco Use    Smoking status: Former    Smokeless tobacco: Never   Substance and Sexual Activity    Alcohol use: Never    Drug use: Never    Sexual activity: Not Currently   Other Topics Concern    Not on file   Social History Narrative    Not on file     Social Determinants of Health     Financial Resource Strain: Not on file   Food Insecurity: Not on file   Transportation Needs: Not on file   Physical Activity: Not on file   Stress: Not on file   Social Connections: Not on file   Intimate Partner Violence: Not on file   Housing Stability: Not on file       Family History:   History reviewed. No pertinent family history. REVIEW OF SYSTEMS:    Review of Systems  Unable to complete, patient was drowsy and unable to stay awake      PHYSICAL EXAM:    Vitals:    /66   Pulse 78   Temp 97.8 °F (36.6 °C) (Oral)   Resp 20   Ht 5' 7\" (1.702 m)   Wt 295 lb (133.8 kg)   SpO2 91%   BMI 46.20 kg/m²   No intake/output data recorded. I/O this shift: In: 480 [P.O.:480]  Out: -     Physical Exam  Vitals reviewed. Constitutional:       General: She is not in acute distress. HENT:      Head: Normocephalic and atraumatic. Eyes:      General: No scleral icterus. Conjunctiva/sclera: Conjunctivae normal.   Pulmonary:      Effort: Pulmonary effort is normal.      Comments: Coarse breath sounds bilateral  Abdominal:      General: There is no distension. Tenderness: There is no abdominal tenderness. Musculoskeletal:      Right lower leg: Edema present. Left lower leg: Edema present. DATA:    Recent Labs     01/19/23  1514 01/20/23  0451   WBC 13.6* 13.9*   HGB 9.1* 8.2*   HCT 29.0* 25.5*   .8* 101.3*    168     Recent Labs     01/19/23  1514 01/20/23  0451   * 132*   K 4.6 5.1   CL 91* 96*   CO2 28 22   GLUCOSE 128* 133*   BUN 55* 63*   CREATININE 5.4* 5.4*   LABGLOM 8* 8*           IMPRESSION/RECOMMENDATIONS:      ESKD. - On HD at [facility]. Last HD was on 1/19/23.  - No acute HD needs today. Nect HD tomorrow. - Vascular access: RUE AVF  - EDW: 122.5kg    Hyperkalemia.  - Potassium level here in the hospital is within normal so no need to do extra HD.  - Low K+ diet. Anemia.  - Continue KOKO with HD. CKD-MBD.  - Continue Hectorol and calcium acetate. Possible HCAP/Chronic Respiratory Failure. - Pulmonary following. On antibiotics. Thank you for allowing me to participate in the care of this patient. Please do not hesitate to contact me at [phone] with questions.     Timothy Rivas Shayan Sanchez MD, MD  1/20/2023  [facility]. [facility]

## 2023-01-21 ENCOUNTER — APPOINTMENT (OUTPATIENT)
Dept: GENERAL RADIOLOGY | Age: 73
DRG: 871 | End: 2023-01-21
Payer: COMMERCIAL

## 2023-01-21 PROBLEM — T17.998A MUCUS PLUG IN RESPIRATORY TRACT: Status: ACTIVE | Noted: 2023-01-21

## 2023-01-21 PROBLEM — T17.908A ASPIRATION OF FOREIGN BODY: Status: ACTIVE | Noted: 2023-01-21

## 2023-01-21 PROBLEM — A41.9 SEVERE SEPSIS (HCC): Status: ACTIVE | Noted: 2023-01-21

## 2023-01-21 PROBLEM — R65.20 SEVERE SEPSIS (HCC): Status: ACTIVE | Noted: 2023-01-21

## 2023-01-21 LAB
ANION GAP SERPL CALCULATED.3IONS-SCNC: 13 MMOL/L (ref 3–16)
BASE EXCESS ARTERIAL: 0.2 MMOL/L (ref -3–3)
BASE EXCESS VENOUS: -1.4 MMOL/L (ref -3–3)
BASOPHILS ABSOLUTE: 0 K/UL (ref 0–0.2)
BASOPHILS RELATIVE PERCENT: 0.4 %
BUN BLDV-MCNC: 66 MG/DL (ref 7–20)
CALCIUM SERPL-MCNC: 8.3 MG/DL (ref 8.3–10.6)
CARBOXYHEMOGLOBIN ARTERIAL: 0.1 % (ref 0–1.5)
CARBOXYHEMOGLOBIN: 2.1 % (ref 0–1.5)
CHLORIDE BLD-SCNC: 97 MMOL/L (ref 99–110)
CO2: 25 MMOL/L (ref 21–32)
CREAT SERPL-MCNC: 5.9 MG/DL (ref 0.6–1.2)
EOSINOPHILS ABSOLUTE: 0.3 K/UL (ref 0–0.6)
EOSINOPHILS RELATIVE PERCENT: 3.8 %
GFR SERPL CREATININE-BSD FRML MDRD: 7 ML/MIN/{1.73_M2}
GLUCOSE BLD-MCNC: 186 MG/DL (ref 70–99)
GLUCOSE BLD-MCNC: 244 MG/DL (ref 70–99)
GLUCOSE BLD-MCNC: 92 MG/DL (ref 70–99)
HCO3 ARTERIAL: 27 MMOL/L (ref 21–29)
HCO3 VENOUS: 25.8 MMOL/L (ref 23–29)
HCT VFR BLD CALC: 25.4 % (ref 36–48)
HEMOGLOBIN, ART, EXTENDED: 9.7 G/DL (ref 12–16)
HEMOGLOBIN: 7.9 G/DL (ref 12–16)
LYMPHOCYTES ABSOLUTE: 1.1 K/UL (ref 1–5.1)
LYMPHOCYTES RELATIVE PERCENT: 14.1 %
MCH RBC QN AUTO: 32.4 PG (ref 26–34)
MCHC RBC AUTO-ENTMCNC: 31.2 G/DL (ref 31–36)
MCV RBC AUTO: 103.8 FL (ref 80–100)
METHEMOGLOBIN ARTERIAL: 0.3 %
METHEMOGLOBIN VENOUS: 0.3 %
MONOCYTES ABSOLUTE: 0.7 K/UL (ref 0–1.3)
MONOCYTES RELATIVE PERCENT: 8.9 %
MRSA SCREEN RT-PCR: ABNORMAL
NEUTROPHILS ABSOLUTE: 5.5 K/UL (ref 1.7–7.7)
NEUTROPHILS RELATIVE PERCENT: 72.8 %
O2 CONTENT, VEN: 13 VOL %
O2 SAT, ARTERIAL: 99.4 %
O2 SAT, VEN: 98 %
O2 THERAPY: ABNORMAL
O2 THERAPY: ABNORMAL
ORGANISM: ABNORMAL
PCO2 ARTERIAL: 55.2 MMHG (ref 35–45)
PCO2, VEN: 56.6 MMHG (ref 40–50)
PDW BLD-RTO: 15.6 % (ref 12.4–15.4)
PERFORMED ON: ABNORMAL
PERFORMED ON: ABNORMAL
PH ARTERIAL: 7.31 (ref 7.35–7.45)
PH VENOUS: 7.28 (ref 7.35–7.45)
PLATELET # BLD: 187 K/UL (ref 135–450)
PMV BLD AUTO: 8.6 FL (ref 5–10.5)
PO2 ARTERIAL: 238.9 MMHG (ref 75–108)
PO2, VEN: 171.6 MMHG (ref 25–40)
POTASSIUM REFLEX MAGNESIUM: 4.9 MMOL/L (ref 3.5–5.1)
RBC # BLD: 2.45 M/UL (ref 4–5.2)
SODIUM BLD-SCNC: 135 MMOL/L (ref 136–145)
TCO2 ARTERIAL: 28.7 MMOL/L
TCO2 CALC VENOUS: 27 MMOL/L
VANCOMYCIN RANDOM: 7.1 UG/ML
WBC # BLD: 7.6 K/UL (ref 4–11)

## 2023-01-21 PROCEDURE — 2000000000 HC ICU R&B

## 2023-01-21 PROCEDURE — 87070 CULTURE OTHR SPECIMN AEROBIC: CPT

## 2023-01-21 PROCEDURE — 2500000003 HC RX 250 WO HCPCS: Performed by: INTERNAL MEDICINE

## 2023-01-21 PROCEDURE — 6370000000 HC RX 637 (ALT 250 FOR IP): Performed by: INTERNAL MEDICINE

## 2023-01-21 PROCEDURE — 6360000002 HC RX W HCPCS

## 2023-01-21 PROCEDURE — 85025 COMPLETE CBC W/AUTO DIFF WBC: CPT

## 2023-01-21 PROCEDURE — 94002 VENT MGMT INPAT INIT DAY: CPT

## 2023-01-21 PROCEDURE — 90935 HEMODIALYSIS ONE EVALUATION: CPT

## 2023-01-21 PROCEDURE — 6360000002 HC RX W HCPCS: Performed by: INTERNAL MEDICINE

## 2023-01-21 PROCEDURE — 82803 BLOOD GASES ANY COMBINATION: CPT

## 2023-01-21 PROCEDURE — 99233 SBSQ HOSP IP/OBS HIGH 50: CPT | Performed by: INTERNAL MEDICINE

## 2023-01-21 PROCEDURE — 0B9F8ZX DRAINAGE OF RIGHT LOWER LUNG LOBE, VIA NATURAL OR ARTIFICIAL OPENING ENDOSCOPIC, DIAGNOSTIC: ICD-10-PCS | Performed by: INTERNAL MEDICINE

## 2023-01-21 PROCEDURE — 94640 AIRWAY INHALATION TREATMENT: CPT

## 2023-01-21 PROCEDURE — 87205 SMEAR GRAM STAIN: CPT

## 2023-01-21 PROCEDURE — 6370000000 HC RX 637 (ALT 250 FOR IP)

## 2023-01-21 PROCEDURE — 31622 DX BRONCHOSCOPE/WASH: CPT

## 2023-01-21 PROCEDURE — 94761 N-INVAS EAR/PLS OXIMETRY MLT: CPT

## 2023-01-21 PROCEDURE — 0BC38ZZ EXTIRPATION OF MATTER FROM RIGHT MAIN BRONCHUS, VIA NATURAL OR ARTIFICIAL OPENING ENDOSCOPIC: ICD-10-PCS | Performed by: INTERNAL MEDICINE

## 2023-01-21 PROCEDURE — 80202 ASSAY OF VANCOMYCIN: CPT

## 2023-01-21 PROCEDURE — 0BC68ZZ EXTIRPATION OF MATTER FROM RIGHT LOWER LOBE BRONCHUS, VIA NATURAL OR ARTIFICIAL OPENING ENDOSCOPIC: ICD-10-PCS | Performed by: INTERNAL MEDICINE

## 2023-01-21 PROCEDURE — 2580000003 HC RX 258: Performed by: INTERNAL MEDICINE

## 2023-01-21 PROCEDURE — 36415 COLL VENOUS BLD VENIPUNCTURE: CPT

## 2023-01-21 PROCEDURE — 36600 WITHDRAWAL OF ARTERIAL BLOOD: CPT

## 2023-01-21 PROCEDURE — 80048 BASIC METABOLIC PNL TOTAL CA: CPT

## 2023-01-21 PROCEDURE — 71045 X-RAY EXAM CHEST 1 VIEW: CPT

## 2023-01-21 PROCEDURE — 2700000000 HC OXYGEN THERAPY PER DAY

## 2023-01-21 PROCEDURE — 5A1D70Z PERFORMANCE OF URINARY FILTRATION, INTERMITTENT, LESS THAN 6 HOURS PER DAY: ICD-10-PCS | Performed by: INTERNAL MEDICINE

## 2023-01-21 PROCEDURE — 36556 INSERT NON-TUNNEL CV CATH: CPT

## 2023-01-21 PROCEDURE — 02HV33Z INSERTION OF INFUSION DEVICE INTO SUPERIOR VENA CAVA, PERCUTANEOUS APPROACH: ICD-10-PCS | Performed by: INTERNAL MEDICINE

## 2023-01-21 RX ORDER — LIDOCAINE HYDROCHLORIDE 40 MG/ML
SOLUTION TOPICAL
Status: DISPENSED
Start: 2023-01-21 | End: 2023-01-22

## 2023-01-21 RX ORDER — 0.9 % SODIUM CHLORIDE 0.9 %
500 INTRAVENOUS SOLUTION INTRAVENOUS ONCE
Status: DISCONTINUED | OUTPATIENT
Start: 2023-01-21 | End: 2023-01-24 | Stop reason: HOSPADM

## 2023-01-21 RX ORDER — DEXMEDETOMIDINE HYDROCHLORIDE 4 UG/ML
.1-1.5 INJECTION, SOLUTION INTRAVENOUS CONTINUOUS
Status: DISCONTINUED | OUTPATIENT
Start: 2023-01-21 | End: 2023-01-23

## 2023-01-21 RX ORDER — OXYCODONE HYDROCHLORIDE 5 MG/1
5 TABLET ORAL 2 TIMES DAILY PRN
Status: DISCONTINUED | OUTPATIENT
Start: 2023-01-21 | End: 2023-01-24 | Stop reason: HOSPADM

## 2023-01-21 RX ORDER — FENTANYL CITRATE 50 UG/ML
25 INJECTION, SOLUTION INTRAMUSCULAR; INTRAVENOUS
Status: DISCONTINUED | OUTPATIENT
Start: 2023-01-21 | End: 2023-01-24 | Stop reason: HOSPADM

## 2023-01-21 RX ORDER — MIDAZOLAM HYDROCHLORIDE 1 MG/ML
2 INJECTION INTRAMUSCULAR; INTRAVENOUS
Status: DISCONTINUED | OUTPATIENT
Start: 2023-01-21 | End: 2023-01-23

## 2023-01-21 RX ORDER — MIDAZOLAM HYDROCHLORIDE 1 MG/ML
INJECTION INTRAMUSCULAR; INTRAVENOUS
Status: COMPLETED
Start: 2023-01-21 | End: 2023-01-21

## 2023-01-21 RX ORDER — FENTANYL CITRATE 50 UG/ML
INJECTION, SOLUTION INTRAMUSCULAR; INTRAVENOUS
Status: COMPLETED
Start: 2023-01-21 | End: 2023-01-21

## 2023-01-21 RX ORDER — GABAPENTIN 100 MG/1
200 CAPSULE ORAL NIGHTLY
Status: DISCONTINUED | OUTPATIENT
Start: 2023-01-22 | End: 2023-01-24 | Stop reason: HOSPADM

## 2023-01-21 RX ADMIN — SODIUM CHLORIDE, PRESERVATIVE FREE 10 ML: 5 INJECTION INTRAVENOUS at 21:00

## 2023-01-21 RX ADMIN — SODIUM CHLORIDE, PRESERVATIVE FREE 10 ML: 5 INJECTION INTRAVENOUS at 10:44

## 2023-01-21 RX ADMIN — LORAZEPAM 0.5 MG: 0.5 TABLET ORAL at 11:29

## 2023-01-21 RX ADMIN — IPRATROPIUM BROMIDE AND ALBUTEROL SULFATE 3 ML: .5; 2.5 SOLUTION RESPIRATORY (INHALATION) at 23:25

## 2023-01-21 RX ADMIN — FENTANYL CITRATE 25 MCG: 50 INJECTION, SOLUTION INTRAMUSCULAR; INTRAVENOUS at 14:59

## 2023-01-21 RX ADMIN — APIXABAN 2.5 MG: 5 TABLET, FILM COATED ORAL at 10:42

## 2023-01-21 RX ADMIN — Medication 15 ML: at 21:00

## 2023-01-21 RX ADMIN — Medication 15 ML: at 10:43

## 2023-01-21 RX ADMIN — EPOETIN ALFA-EPBX 5000 UNITS: 2000 INJECTION, SOLUTION INTRAVENOUS; SUBCUTANEOUS at 08:40

## 2023-01-21 RX ADMIN — OXYCODONE HYDROCHLORIDE 5 MG: 5 TABLET ORAL at 10:43

## 2023-01-21 RX ADMIN — MIDAZOLAM 2 MG: 1 INJECTION INTRAMUSCULAR; INTRAVENOUS at 14:59

## 2023-01-21 RX ADMIN — FENTANYL CITRATE 25 MCG: 50 INJECTION INTRAMUSCULAR; INTRAVENOUS at 14:59

## 2023-01-21 RX ADMIN — IPRATROPIUM BROMIDE AND ALBUTEROL SULFATE 3 ML: .5; 2.5 SOLUTION RESPIRATORY (INHALATION) at 15:17

## 2023-01-21 RX ADMIN — IPRATROPIUM BROMIDE AND ALBUTEROL SULFATE 3 ML: .5; 2.5 SOLUTION RESPIRATORY (INHALATION) at 19:31

## 2023-01-21 RX ADMIN — IPRATROPIUM BROMIDE AND ALBUTEROL SULFATE 3 ML: .5; 2.5 SOLUTION RESPIRATORY (INHALATION) at 03:44

## 2023-01-21 RX ADMIN — NOREPINEPHRINE BITARTRATE 5 MCG/MIN: 1 INJECTION, SOLUTION, CONCENTRATE INTRAVENOUS at 14:56

## 2023-01-21 RX ADMIN — Medication 500 ML: at 14:58

## 2023-01-21 RX ADMIN — PANTOPRAZOLE SODIUM 40 MG: 40 TABLET, DELAYED RELEASE ORAL at 05:59

## 2023-01-21 RX ADMIN — DOXERCALCIFEROL 3 MCG: 4 INJECTION, SOLUTION INTRAVENOUS at 08:39

## 2023-01-21 RX ADMIN — EPOETIN ALFA-EPBX 5000 UNITS: 2000 INJECTION, SOLUTION INTRAVENOUS; SUBCUTANEOUS at 09:20

## 2023-01-21 RX ADMIN — Medication 1500 MG: at 11:07

## 2023-01-21 RX ADMIN — SERTRALINE HYDROCHLORIDE 50 MG: 50 TABLET ORAL at 10:43

## 2023-01-21 RX ADMIN — HYDROCORTISONE 2.5%: 25 CREAM TOPICAL at 11:11

## 2023-01-21 RX ADMIN — CEFEPIME 1000 MG: 1 INJECTION, POWDER, FOR SOLUTION INTRAMUSCULAR; INTRAVENOUS at 18:08

## 2023-01-21 RX ADMIN — IPRATROPIUM BROMIDE AND ALBUTEROL SULFATE 3 ML: .5; 2.5 SOLUTION RESPIRATORY (INHALATION) at 11:38

## 2023-01-21 ASSESSMENT — PULMONARY FUNCTION TESTS
PIF_VALUE: 29
PIF_VALUE: 16
PIF_VALUE: 21
PIF_VALUE: 29

## 2023-01-21 ASSESSMENT — PAIN SCALES - GENERAL
PAINLEVEL_OUTOF10: 10
PAINLEVEL_OUTOF10: 10

## 2023-01-21 ASSESSMENT — PAIN DESCRIPTION - LOCATION: LOCATION: BUTTOCKS

## 2023-01-21 ASSESSMENT — PAIN DESCRIPTION - ORIENTATION: ORIENTATION: INNER

## 2023-01-21 NOTE — PROGRESS NOTES
Patient off the floor for dialysis. Transferred care to [facility].  Alexus Alexandra RN

## 2023-01-21 NOTE — PROCEDURES
Procedure: Nontunneled central venous access, right femoral    Indication: invasive hemodynamic monitoring, frequent blood draws, ensure stable IV access    Informed Consent: was obtained from patient    Time Out: taken    Procedure: Sterile prep with chlorhexidine. Full maximum sterile field/barrier technique was followed (with cap and mask and sterile gown and large sterile sheet and hand hygeine and 2% chlorhexidine). Aqueous lidocaine anesthetic. Direct ultrasound visualization of the right internal jugular vein, vein was accessed easily however not able to thread the wire, likely due to vasculopathy given her ESRD. Procedure was converted to right femoral line ultrasound-guided visualized with  placement of central venous catheter using modified seldinger technique. Good dark venous blood from all 3 lumens. No immediate complication.     Recommendation: remove central line at earliest time feasible to mitigate infectious risks

## 2023-01-21 NOTE — PROGRESS NOTES
Vitals:    01/21/23 0245   BP: 131/81   Pulse: 76   Resp: 18   Temp: 97.5 °F (36.4 °C)   SpO2: 98%     Patient was reassessed. VSS. Patient sitting semifowlers and was awake upon entering the room. Repositioned patient, lowered HOB, and dimmed lights to provide more comfort. Call light within reach. Bed in lowest position. No additional needs at this time.  Jeannine Kwong RN

## 2023-01-21 NOTE — PROGRESS NOTES
Vanco day 3  AM random 7.1, HD today.   Will replace with 1500mg x1 after HD, repeat random in AM  [facility] [place] PharmD 1/21/2023 8:52 AM

## 2023-01-21 NOTE — PROGRESS NOTES
Pulmonary Progress Note    CC: Pneumonia    Subjective:   Postdialysis with shortness of breath with increased work of breathing and hypoxemia moved to ICU placed on mechanical ventilation  Hypotensive systolic in the 08I was given fluid boluses by me at the bedside  Required Precedex drip while on mechanical ventilation  PEEP 8  FiO2 100%  Try to place right IJ which was accessed easily under ultrasound, however not able to thread the wire and procedure converted to right femoral.  IV line: Peripheral IVs      Intake/Output Summary (Last 24 hours) at 1/21/2023 0815  Last data filed at 1/20/2023 1738  Gross per 24 hour   Intake 720 ml   Output --   Net 720 ml       Exam:   BP (!) 103/47   Pulse 86   Temp 97.5 °F (36.4 °C)   Resp 19   Ht 5' 7\" (1.702 m)   Wt 295 lb 4.8 oz (133.9 kg)   SpO2 100%   BMI 46.25 kg/m²  on assist control 300/18  Gen: On mechanical ventilation sedated. Ill-appearing  Eyes: PERRL. No sclera icterus. No conjunctival injection. ENT: No discharge. Pharynx clear. Tracheostomy tube in place  Neck: Trachea midline. No obvious mass. Resp: No accessory muscle use. Few crackles. No wheezes. No rhonchi. No dullness on percussion. CV: Regular rate. Regular rhythm. No murmur or rub.  1-2+ LE edema. GI: Non-tender. Non-distended. No hernia. Skin: Warm and dry. No nodule on exposed extremities. Lymph: No cervical LAD. No supraclavicular LAD. M/S: No cyanosis. No joint deformity. No clubbing. Right arm fistula  Neuro: Sedated on mechanical ventilation. Woke up followed commands. Psych: No agitation.   No anxiety    Scheduled Meds:   traZODone  50 mg Oral Nightly    sertraline  50 mg Oral Daily    sennosides-docusate sodium  2 tablet Oral Nightly    oxyCODONE  5 mg Oral BID    pantoprazole  40 mg Oral QAM AC    midodrine  10 mg Oral Once per day on Mon Wed Fri    [Held by provider] metoprolol tartrate  25 mg Oral BID    ipratropium-albuterol  1 vial Inhalation Q4H    gabapentin  300 mg Oral TID    chlorhexidine  15 mL Mouth/Throat BID    epoetin claire-epbx  5,000 Units IntraVENous Once per day on Tue Thu Sat    doxercalciferol  3 mcg IntraVENous Once per day on Tue Thu Sat    cefepime  1,000 mg IntraVENous Q24H    apixaban  2.5 mg Oral BID    atorvastatin  40 mg Oral Nightly    hydrocortisone   Rectal BID    sodium chloride flush  5-40 mL IntraVENous 2 times per day    vancomycin (VANCOCIN) intermittent dosing (placeholder)   Other RX Placeholder     Continuous Infusions:   sodium chloride       PRN Meds:  carboxymethylcellulose PF, LORazepam, sodium chloride flush, sodium chloride, ondansetron **OR** ondansetron, polyethylene glycol, acetaminophen **OR** acetaminophen, ipratropium-albuterol    Labs:  CBC:   Recent Labs     01/19/23  1514 01/20/23  0451 01/21/23  0500   WBC 13.6* 13.9* 7.6   HGB 9.1* 8.2* 7.9*   HCT 29.0* 25.5* 25.4*   .8* 101.3* 103.8*    168 187     BMP:   Recent Labs     01/19/23  1514 01/20/23  0451 01/21/23  0500   * 132* 135*   K 4.6 5.1 4.9   CL 91* 96* 97*   CO2 28 22 25   BUN 55* 63* 66*   CREATININE 5.4* 5.4* 5.9*     LIVER PROFILE:   Recent Labs     01/19/23  1514   AST 12*   ALT 11   BILITOT 0.7   ALKPHOS 89     PT/INR: No results for input(s): PROTIME, INR in the last 72 hours. APTT: No results for input(s): APTT in the last 72 hours. UA:No results for input(s): NITRITE, COLORU, PHUR, LABCAST, WBCUA, RBCUA, MUCUS, TRICHOMONAS, YEAST, BACTERIA, CLARITYU, SPECGRAV, LEUKOCYTESUR, UROBILINOGEN, BILIRUBINUR, BLOODU, GLUCOSEU, AMORPHOUS in the last 72 hours.     Invalid input(s): Shana Greenberg  Recent Labs     01/20/23  1147   PHART 7.338*   QKU7VSS 50.9*   PO2ART 67.9*       Cultures:   1/19 BC NGTD  1/19 COVID-19 not detected  1/20 tracheal aspirate    Films:  Chest x-ray 1/19 imaging was reviewed by me and showed   Right basilar airspace disease  Mild cardiomegaly     ASSESSMENT:  Acute hypoxemic hypercapnic respiratory failure  Acute encephalopathy/metabolic encephalopathy-suspecting hypercapnia  Healthcare associated pneumonia - probable gram negative, risk for methicillin resistant Staph aureus as well  Severe sepsis  Chronic tracheostomy -history of chronic respiratory failure requiring continuous mechanical ventilation through tracheostomy  ESRD on HD  Systolic CHF EF 00%  History of SILVER and pulmonary hypertension  Prior history of Proteus, Acinetobacter, Pseudomonas pneumonia  History of A. fib, VTE and CVA on Eliquis     PLAN:  Mechanical ventilation as per my orders. The ventilator was adjusted by me at the bedside for unstable, life threatening respiratory failure  Follow ABG and chest x-ray while on the ventilator  Supplemental oxygen to maintain SaO2 >92%; wean as tolerated  IV Precedex for sedation, target RASS -2, with daily spontaneous awakening trial   Fentanyl and Versed PRN, gtt as needed  Head of bed 30 degrees or higher at all times  Daily spontaneous breathing trial once PEEP less than 8, FiO2 less than 55%  IV antibiotics to include vancomycin and cefepime day #2  Monitor vancomycin level for toxicity  Central IV access for pressors  IV Levophed to keep MAP > 65 mmHg or SBP>90   Lactic acid every 6 hours  Surveillance/diagnostic bronchoscopy with BAL to better identify causative pathogen which will help steering antibiotics options/de-escalation and rule out aspiration.   HD per nephrology  BM regiment   Blood sugar control ISS, with goal 150-180  GI prophylaxis: Pepcid  DVT prophylaxis: Eliquis  MRSA prophylaxis: Bactroban  Discussed with internal medicine        Total critical care time caring for this patient with life threatening, unstable organ failure, including direct patient contact, management of life support systems, review of data including imaging and labs, discussions with other team members and physicians is 80 minutes, including failed right IJ central line attempt

## 2023-01-21 NOTE — PROGRESS NOTES
Speech Language Pathology    Attempted to see for clinical re-evaluation of swallow. Pt at dialysis this morning. Upon return, pt ate ~50% of her morning meal, then noted to have worsening hypoxia. MD suspects likely sedation vs mucous plugging, and pt transferred to ICU for vent support and planned for bronch today. Pt with prior recommendation to allow PO only when able to tolerate cuff deflation, had been swallowing soft and bite sized foods and thin liquids during admission a year ago. She had since advanced to regular solids and had been clinically tolerating this as recently as yesterday. Swallow reassessment held at this time pending bronchoscopy. Will follow on Monday per SLP schedule. Prince Lopez MS CCC-SLP  Speech Language Pathologist

## 2023-01-21 NOTE — PROGRESS NOTES
RT Inhaler-Nebulizer Bronchodilator Protocol Note    There is a bronchodilator order in the chart from a provider indicating to follow the RT Bronchodilator Protocol and there is an Initiate RT Inhaler-Nebulizer Bronchodilator Protocol order as well (see protocol at bottom of note). CXR Findings:  No results found. The findings from the last RT Protocol Assessment were as follows:   History Pulmonary Disease: (P) Smoker 15 pack years or more  Respiratory Pattern: (P) Dyspnea on exertion or RR 21-25 bpm  Breath Sounds: (P) Slightly diminished and/or crackles  Cough: (P) Strong, productive  Indication for Bronchodilator Therapy: (P) On home bronchodilators  Bronchodilator Assessment Score: (P) 6    Aerosolized bronchodilator medication orders have been revised according to the RT Inhaler-Nebulizer Bronchodilator Protocol below. Respiratory Therapist to perform RT Therapy Protocol Assessment initially then follow the protocol. Repeat RT Therapy Protocol Assessment PRN for score 0-3 or on second treatment, BID, and PRN for scores above 3. No Indications - adjust the frequency to every 6 hours PRN wheezing or bronchospasm, if no treatments needed after 48 hours then discontinue using Per Protocol order mode. If indication present, adjust the RT bronchodilator orders based on the Bronchodilator Assessment Score as indicated below. Use Inhaler orders unless patient has one or more of the following: on home nebulizer, not able to hold breath for 10 seconds, is not alert and oriented, cannot activate and use MDI correctly, or respiratory rate 25 breaths per minute or more, then use the equivalent nebulizer order(s) with same Frequency and PRN reasons based on the score. If a patient is on this medication at home then do not decrease Frequency below that used at home.     0-3 - enter or revise RT bronchodilator order(s) to equivalent RT Bronchodilator order with Frequency of every 4 hours PRN for wheezing or increased work of breathing using Per Protocol order mode. 4-6 - enter or revise RT Bronchodilator order(s) to two equivalent RT bronchodilator orders with one order with BID Frequency and one order with Frequency of every 4 hours PRN wheezing or increased work of breathing using Per Protocol order mode. 7-10 - enter or revise RT Bronchodilator order(s) to two equivalent RT bronchodilator orders with one order with TID Frequency and one order with Frequency of every 4 hours PRN wheezing or increased work of breathing using Per Protocol order mode. 11-13 - enter or revise RT Bronchodilator order(s) to one equivalent RT bronchodilator order with QID Frequency and an Albuterol order with Frequency of every 4 hours PRN wheezing or increased work of breathing using Per Protocol order mode. Greater than 13 - enter or revise RT Bronchodilator order(s) to one equivalent RT bronchodilator order with every 4 hours Frequency and an Albuterol order with Frequency of every 2 hours PRN wheezing or increased work of breathing using Per Protocol order mode.          Electronically signed by Margarita Broderick RCP on 1/21/2023 at 3:04 PM

## 2023-01-21 NOTE — PROGRESS NOTES
Bedside report and transfer of care given to OSMIN Lozano. Pt currently resting in bed with the call light within reach. Pt denies any other care needs at this time. Pt stable at this time.

## 2023-01-21 NOTE — PROGRESS NOTES
Nephrology Progress Note   [email or web address]      Sub/interval history  Seen and examined on HD on 1/21 with UF goal increased to 4 L  Patient reports of being cold but confused      ROS: No chest pain/shortness of breath/fever/nausea/vomiting  PSFH: No visitor    Scheduled Meds:   vancomycin  1,500 mg IntraVENous Once    [START ON 1/24/2023] epoetin claire-epbx  10,000 Units IntraVENous Once per day on Tue Thu Sat    traZODone  50 mg Oral Nightly    sertraline  50 mg Oral Daily    sennosides-docusate sodium  2 tablet Oral Nightly    oxyCODONE  5 mg Oral BID    pantoprazole  40 mg Oral QAM AC    midodrine  10 mg Oral Once per day on Mon Wed Fri    [Held by provider] metoprolol tartrate  25 mg Oral BID    ipratropium-albuterol  1 vial Inhalation Q4H    gabapentin  300 mg Oral TID    chlorhexidine  15 mL Mouth/Throat BID    doxercalciferol  3 mcg IntraVENous Once per day on Tue Thu Sat    cefepime  1,000 mg IntraVENous Q24H    apixaban  2.5 mg Oral BID    atorvastatin  40 mg Oral Nightly    hydrocortisone   Rectal BID    sodium chloride flush  5-40 mL IntraVENous 2 times per day    vancomycin (VANCOCIN) intermittent dosing (placeholder)   Other RX Placeholder     Continuous Infusions:   sodium chloride       PRN Meds:.carboxymethylcellulose PF, LORazepam, sodium chloride flush, sodium chloride, ondansetron **OR** ondansetron, polyethylene glycol, acetaminophen **OR** acetaminophen, ipratropium-albuterol    Objective/     Vitals:    01/21/23 0346 01/21/23 0727 01/21/23 1011 01/21/23 1030   BP:  (!) 103/47 (!) 106/49 110/62   Pulse:  86 79 84   Resp: 18 19 20 20   Temp:  97.5 °F (36.4 °C) 97.5 °F (36.4 °C) 97.1 °F (36.2 °C)   TempSrc:    Oral   SpO2: 100%   95%   Weight:       Height:         24HR INTAKE/OUTPUT:    Intake/Output Summary (Last 24 hours) at 1/21/2023 1048  Last data filed at 1/21/2023 1011  Gross per 24 hour   Intake 880 ml   Output 3800 ml   Net -2920 ml     Gen: alert, awake  Neck: No JVD  Skin: Unremarkable  Cardiovascular:  S1, S2 without m/r/g   Respiratory: CTA B without w/r/r; respiratory effort normal  Abdomen:  soft, nt, nd,   Extremities: No lower extremity edema, edema noted on right upper extremity along with increased abdominal girth  Neuro/Psy: AAoriented times 2 ; moves all 4 ext    Data/  Recent Labs     01/19/23  1514 01/20/23  0451 01/21/23  0500   WBC 13.6* 13.9* 7.6   HGB 9.1* 8.2* 7.9*   HCT 29.0* 25.5* 25.4*   .8* 101.3* 103.8*    168 187     Recent Labs     01/19/23  1514 01/20/23  0451 01/21/23  0500   * 132* 135*   K 4.6 5.1 4.9   CL 91* 96* 97*   CO2 28 22 25   GLUCOSE 128* 133* 92   BUN 55* 63* 66*   CREATININE 5.4* 5.4* 5.9*   LABGLOM 8* 8* 7*     IMPRESSION/RECOMMENDATIONS:       ESKD. - On HD at [facility]. Last HD was on 1/19/23. -HD per schedule increase UF goal to 4 L on 1/21  - Vascular access: RUE AVF  - EDW: 122.5kg     Hyperkalemia.  - Potassium level here in the hospital is within normal limit. - Low K+ diet. Anemia.  - Continue KOKO with HD.  -Increase Retacrit to 10,000 units with each dialysis     CKD-MBD.  - Continue Hectorol and calcium acetate. Possible HCAP/Chronic Respiratory Failure. - Pulmonary following. On antibiotics. Joyce Metcalf MD  Office: [phone]  Fax:    [phone] [address] [facility]

## 2023-01-21 NOTE — PROGRESS NOTES
1302  Pt moved over to ICU 10 from PCU d/t respiratory distress. Dr. Leighann Koo notified and was shortly afterwards at bedside. Plan:  -Bronch pt.  -Place CVC.    26  Dr. Leighann Koo remains at bedside and is preparing to bronch pt.    1415  BAL, washings and foreign object from lung sent to lab for testing. 1430  Preparing pt for CVC placement.    -unsuccessful in RIJ placement. Line was successfully placed in pt's R femoral. Levophed started after CVC placed. 1724  Reassessment completed, see flowsheets, unchanged from prior. VSS. All ICU lines and monitoring remain in place. Pt continues to rest in bed with eyes closed. Bed locked in lowest position. Call light within reach.

## 2023-01-21 NOTE — PROCEDURES
PROCEDURE: Surveillance/diagnostic BRONCHOSCOPY WITH BRONCHOALVEOLAR LAVAGE and foreign body removal    PRE-PROCEDURE EVALUATION:  Nursing History and Physical reviewed and agreed upon     Allergies and medications have been reviewed    HENT: Tracheostomy tube in place. On mechanical ventilation not able to assess Mallampati. Cardiovascular: Normal rate, regular rhythm, normal heart sounds. Pulmonary/Chest: No wheezes. Few rhonchi. No rales. Abdominal: Soft. Bowel sounds are normal. No distension. ASA CLASS    IV. Severe Systemic Disease which is a threat to life. Sedation plan:   Continue ICU sedation    Post Procedure Plan   Continue ICU care     The risks and benefits as well as alternatives to the procedure have been discussed with the patient/family. The patient/next of kin understands and agrees to proceed. DESCRIPTION OF PROCEDURE: A time out was taken. ICU sedation continued. The scope was passed with ease via the tracheostomy tube. A complete airway inspection was performed. Large foreign body occluding right mainstem. Airway retrieval basket was used and the RLL foreign body was extracted in total.  Bilateral thick, purulent secretions bilateral airways with mucous plugs, worse right lower lobe with large mucous plug aspirated. Saline injection followed by a therapeutic aspiration was performed. Washings were obtained throughout the airways. A Bronchoalveolar lavage was obtained from the RLL with good return. The patient tolerated the procedure well. Estimated blood loss was less than 5 ml. Recovery will be per endoscopy protocol. FOLLOW UP:  Cultures and cytology.

## 2023-01-21 NOTE — PROGRESS NOTES
Patient returned from dialysis, patient given 50% of breakfast then complained of SOB. This RN suctioned patient through trach with minimal mucous. Ativan given per [facility] as patient continues to say, \"I'm dying aren't I? \"  This RN called respiratory for breathing treatment and changed pads on bed. Will continue to monitor.  Kady Fernandez RN

## 2023-01-21 NOTE — PROGRESS NOTES
Patient moved to ICU for sats of 86%. IV Ativan given @ 11:31 for increased anxiety and calling out. Report given to [facility] in ICU.   Elissa Redding RN

## 2023-01-21 NOTE — PROGRESS NOTES
Assisted M.D. with bronchoscopy. Pt placed on PCV 15 and 100% Fio2 during procedure. Pt. nayeli well and without complication. Returned to previous settings after procedure.

## 2023-01-21 NOTE — PROGRESS NOTES
RT Inhaler-Nebulizer Bronchodilator Protocol Note    There is a bronchodilator order in the chart from a provider indicating to follow the RT Bronchodilator Protocol and there is an Initiate RT Inhaler-Nebulizer Bronchodilator Protocol order as well (see protocol at bottom of note). CXR Findings:  XR CHEST PORTABLE    Result Date: 1/19/2023  1. Right basilar airspace disease. This could reflect atelectasis versus pneumonia in the appropriate clinical setting. 2.  Mild cardiomegaly       The findings from the last RT Protocol Assessment were as follows:   History Pulmonary Disease: Smoker 15 pack years or more  Respiratory Pattern: Regular pattern and RR 12-20 bpm  Breath Sounds: Inspiratory and expiratory or bilateral wheezing and/or rhonchi  Cough: Strong, spontaneous, non-productive  Indication for Bronchodilator Therapy: Decreased or absent breath sounds  Bronchodilator Assessment Score: 7    Aerosolized bronchodilator medication orders have been revised according to the RT Inhaler-Nebulizer Bronchodilator Protocol below. Respiratory Therapist to perform RT Therapy Protocol Assessment initially then follow the protocol. Repeat RT Therapy Protocol Assessment PRN for score 0-3 or on second treatment, BID, and PRN for scores above 3. No Indications - adjust the frequency to every 6 hours PRN wheezing or bronchospasm, if no treatments needed after 48 hours then discontinue using Per Protocol order mode. If indication present, adjust the RT bronchodilator orders based on the Bronchodilator Assessment Score as indicated below. Use Inhaler orders unless patient has one or more of the following: on home nebulizer, not able to hold breath for 10 seconds, is not alert and oriented, cannot activate and use MDI correctly, or respiratory rate 25 breaths per minute or more, then use the equivalent nebulizer order(s) with same Frequency and PRN reasons based on the score.   If a patient is on this medication at home then do not decrease Frequency below that used at home. 0-3 - enter or revise RT bronchodilator order(s) to equivalent RT Bronchodilator order with Frequency of every 4 hours PRN for wheezing or increased work of breathing using Per Protocol order mode. 4-6 - enter or revise RT Bronchodilator order(s) to two equivalent RT bronchodilator orders with one order with BID Frequency and one order with Frequency of every 4 hours PRN wheezing or increased work of breathing using Per Protocol order mode. 7-10 - enter or revise RT Bronchodilator order(s) to two equivalent RT bronchodilator orders with one order with TID Frequency and one order with Frequency of every 4 hours PRN wheezing or increased work of breathing using Per Protocol order mode. 11-13 - enter or revise RT Bronchodilator order(s) to one equivalent RT bronchodilator order with QID Frequency and an Albuterol order with Frequency of every 4 hours PRN wheezing or increased work of breathing using Per Protocol order mode. Greater than 13 - enter or revise RT Bronchodilator order(s) to one equivalent RT bronchodilator order with every 4 hours Frequency and an Albuterol order with Frequency of every 2 hours PRN wheezing or increased work of breathing using Per Protocol order mode.        Electronically signed by Darin Doe RCP on 1/20/2023 at 8:46 PM

## 2023-01-21 NOTE — PROGRESS NOTES
Shift assessment complete. VSS. Patient reports pain in buttocks. Patient was turned and repositioned and also given medication per [facility] for pain. Call light in place. Bed in lowest position. No additional needs at this time.  Jonathan Odom RN

## 2023-01-21 NOTE — PROGRESS NOTES
IM Progress Note    Admit Date:  1/19/2023  2    Interval history:  chronic trach on 2 L , presented from [facility] for sob and hyperkalemia    Subjective:  Ms. Sherita Oliver seen slightly drowsy, progressive decline in resp status with worsening hypoxia noted  Pt arousable but falls asleep , now on 10 L o2   Given pain pill and ativan this am   Had HD   Nasal swab with MRSA +    Objective:   /81   Pulse 76   Temp 97.5 °F (36.4 °C) (Oral)   Resp 18   Ht 5' 7\" (1.702 m)   Wt 295 lb 4.8 oz (133.9 kg)   SpO2 100%   BMI 46.25 kg/m²     Intake/Output Summary (Last 24 hours) at 1/21/2023 0771  Last data filed at 1/20/2023 1738  Gross per 24 hour   Intake 720 ml   Output --   Net 720 ml       Physical Exam:     General:  elderly AA female, obese  up in bed,drowsy, arousable   Mucous Membranes:  Pink , anicteric  Tracheostomy status   Neck: No JVD, no carotid bruit, no thyromegaly  Chest:  severly diminished jackie , worsening hypoxia  Cardiovascular:  RRR S1S2 heard, no murmurs or gallops  Abdomen:  Soft, obese, undistended, non tender, no organomegaly, BS present  Extremities: right UE AV fistula, chronic atrophic ext   No edema or cyanosis.  Distal pulses well felt  Neurological drowsy post HD, non ambulatory and bed bound          Medications:   Scheduled Medications:    traZODone  50 mg Oral Nightly    sertraline  50 mg Oral Daily    sennosides-docusate sodium  2 tablet Oral Nightly    oxyCODONE  5 mg Oral BID    pantoprazole  40 mg Oral QAM AC    midodrine  10 mg Oral Once per day on Mon Wed Fri    [Held by provider] metoprolol tartrate  25 mg Oral BID    ipratropium-albuterol  1 vial Inhalation Q4H    gabapentin  300 mg Oral TID    chlorhexidine  15 mL Mouth/Throat BID    epoetin claire-epbx  5,000 Units IntraVENous Once per day on Tue Thu Sat    doxercalciferol  3 mcg IntraVENous Once per day on Tue Thu Sat    cefepime  1,000 mg IntraVENous Q24H    apixaban  2.5 mg Oral BID    atorvastatin  40 mg Oral Nightly hydrocortisone   Rectal BID    sodium chloride flush  5-40 mL IntraVENous 2 times per day    vancomycin (VANCOCIN) intermittent dosing (placeholder)   Other RX Placeholder     I   sodium chloride       carboxymethylcellulose PF, LORazepam, sodium chloride flush, sodium chloride, ondansetron **OR** ondansetron, polyethylene glycol, acetaminophen **OR** acetaminophen, ipratropium-albuterol    Lab Data:  Recent Labs     01/19/23  1514 01/20/23  0451 01/21/23  0500   WBC 13.6* 13.9* 7.6   HGB 9.1* 8.2* 7.9*   HCT 29.0* 25.5* 25.4*   .8* 101.3* 103.8*    168 187     Recent Labs     01/19/23  1514 01/20/23  0451 01/21/23  0500   * 132* 135*   K 4.6 5.1 4.9   CL 91* 96* 97*   CO2 28 22 25   BUN 55* 63* 66*   CREATININE 5.4* 5.4* 5.9*     Recent Labs     01/19/23  1514 01/19/23  1656   TROPONINI 0.24* 0.20*       Coagulation:   Lab Results   Component Value Date/Time    INR 1.33 05/01/2022 08:40 AM    APTT 34.2 04/27/2022 05:24 AM     Cardiac markers:   Lab Results   Component Value Date/Time    TROPONINI 0.20 01/19/2023 04:56 PM         Lab Results   Component Value Date    ALT 11 01/19/2023    AST 12 (L) 01/19/2023    ALKPHOS 89 01/19/2023    BILITOT 0.7 01/19/2023       Lab Results   Component Value Date    INR 1.33 (H) 05/01/2022    INR 1.36 (H) 05/01/2022    INR 1.22 (H) 04/30/2022    PROTIME 15.2 (H) 05/01/2022    PROTIME 15.6 (H) 05/01/2022    PROTIME 13.9 (H) 04/30/2022       Radiology    CULTURES  Blood cultures pending  COVID and Flu not detected      EKG: JAN-2023 14:16:28 [facility] ROUTINE RECORD  Sinus rhythm with 1st degree A-V block  Low voltage QRS  Cannot rule out Anterior infarct (cited on or before 19-JAN-2023)  Abnormal ECG  When compared with ECG of 27-OCT-2022 07:18,  No significant change was found  Confirmed by Sirena Nettles MD, [address] ([id number]) on 1/19/2023 4:54:06 PM     RADIOLOGY  XR CHEST PORTABLE   Final Result   1. Right basilar airspace disease.   This could reflect atelectasis versus   pneumonia in the appropriate clinical setting. 2.  Mild cardiomegaly                    Pertinent previous results reviewed   Limited echocardiogram 10/27/2022  Summary  Limited only f/u for LVEF. LV systolic function is mildly reduced with a visually estimated EF of 45-50%. No obvious segmental wall motion abnormality.      ASSESSMENT/PLAN:     #Possible HCAP  #acute on Chronic hypoxic respiratory failure with tracheostomy     -s/p tracheostomy over 1 year ago, on trach collar with 2 L O2 at baseline  -pt with hx of MDRO, MRSA, pseudomonas   -procalc, WBC elevated, no fevers, BP stable      -CXR as above with atelectasis vs pna , MRSA nasal swab +ve   -COVID and Flu not detected   -pulmonology  following      -Blood cultures pending   -worsening hypoxia today , likely sedation vs mucous plugging  - transfer to ICU for vent support and planned for bronch today    - continue on IV vancomycin and cefepime started    #ESRD on HD   #Hyperkalemia   -reportedly potassium of 7.0 after dialysis   -5.1 on recheck   -dialysis T, Thurs, Sat schedule   -continue home meds   -nephrology consulted and resumed HD     #Elevated troponin   #CAD  -0.20, chronically elevated   -ASA, statin, BB   -at chem stress test during Sept admission 2022, cards recommended medical management   -no acute ischemic EKG changes  -no CP      #Hypotension   -on midodrine 3x weekly   -holding lopressor      #Chronic macrocyctic anemia   -Hgb slightly lower than baseline   -continue to monitor   -no s/s of acute bleeding      #Chronic diastolic HF   -last echo as above   -does not appear acutely decompensated      #Hyponatremia   -mild  -monitor      #DM type 2  -no home regimen   -BG stable today   -continue to monitor      #Atrial fibrillation   Hx of VTE   -NSR on presentation   -on eliquis for [facility]         #Hx of CVA   -on eliquis  -ASA, statin         #Anxiety   #Depression   -ativan prn - hold   -on zoloft      #GERD   -PPI #Chronic pain   -on gabapentin, roxicodone     DVT Prophylaxis: eliquis   Diet: ADULT DIET;  Regular; Low Potassium (Less than 3000 mg/day)  Code Status: Full Code     Transfer to ICU    Nya Mays MD, 1/21/2023 7:47 AM

## 2023-01-21 NOTE — PROGRESS NOTES
Treatment time: 2 hours 44 mins     Net UF: 3.4 Net     Pre weight: 133.9kg   Post weight: 130.5kg  EDW: 122    Access used: Left upper arm fistula   Access function: well, tolerated 400 BFR, 15g     Medications or blood products given: Retacit 10,000u, hectorol    Regular outpatient schedule: Kaylin ARREOLA     Summary of response to treatment: okay. VSS. Pt screaming last 20 mins of tx \"I cant breath!\" VSS. 02 stat WNLS. Pt seems anxious to get back to room, confused.    Copy of dialysis treatment record placed in chart, to be scanned into EMR.     01/21/23 0727 01/21/23 1011   Vital Signs   BP (!) 103/47 (!) 106/49   Temp 97.5 °F (36.4 °C) 97.5 °F (36.4 °C)   Heart Rate 86 79   Resp 19 20   Post-Hemodialysis Assessment   Hemodialysis Intake (ml)  --  400 ml   Hemodialysis Output (ml)  --  3800 ml   NET Removed (ml)  --  3400   Dialysis Bath   K+ (Potassium) 2  --    Ca+ (Calcium) 2.5  --    Na+ (Sodium) 138  --    HCO3 (Bicarb) 35  --

## 2023-01-22 ENCOUNTER — APPOINTMENT (OUTPATIENT)
Dept: GENERAL RADIOLOGY | Age: 73
DRG: 871 | End: 2023-01-22
Payer: COMMERCIAL

## 2023-01-22 PROBLEM — R65.21 SEPTIC SHOCK (HCC): Status: ACTIVE | Noted: 2023-01-22

## 2023-01-22 PROBLEM — A41.9 SEPTIC SHOCK (HCC): Status: ACTIVE | Noted: 2023-01-22

## 2023-01-22 LAB
ANION GAP SERPL CALCULATED.3IONS-SCNC: 12 MMOL/L (ref 3–16)
BASE EXCESS ARTERIAL: -2 MMOL/L (ref -3–3)
BASOPHILS ABSOLUTE: 0 K/UL (ref 0–0.2)
BASOPHILS RELATIVE PERCENT: 0.3 %
BUN BLDV-MCNC: 44 MG/DL (ref 7–20)
CALCIUM SERPL-MCNC: 8.4 MG/DL (ref 8.3–10.6)
CARBOXYHEMOGLOBIN ARTERIAL: 0.8 % (ref 0–1.5)
CHLORIDE BLD-SCNC: 93 MMOL/L (ref 99–110)
CO2: 26 MMOL/L (ref 21–32)
CREAT SERPL-MCNC: 4.5 MG/DL (ref 0.6–1.2)
CULTURE, RESPIRATORY: NORMAL
EOSINOPHILS ABSOLUTE: 0.2 K/UL (ref 0–0.6)
EOSINOPHILS RELATIVE PERCENT: 1.5 %
GFR SERPL CREATININE-BSD FRML MDRD: 10 ML/MIN/{1.73_M2}
GLUCOSE BLD-MCNC: 117 MG/DL (ref 70–99)
GLUCOSE BLD-MCNC: 124 MG/DL (ref 70–99)
GRAM STAIN RESULT: NORMAL
HCO3 ARTERIAL: 23.2 MMOL/L (ref 21–29)
HCT VFR BLD CALC: 25.5 % (ref 36–48)
HEMOGLOBIN, ART, EXTENDED: 9.3 G/DL (ref 12–16)
HEMOGLOBIN: 8.1 G/DL (ref 12–16)
LYMPHOCYTES ABSOLUTE: 0.8 K/UL (ref 1–5.1)
LYMPHOCYTES RELATIVE PERCENT: 7.2 %
MAGNESIUM: 1.9 MG/DL (ref 1.8–2.4)
MCH RBC QN AUTO: 31.9 PG (ref 26–34)
MCHC RBC AUTO-ENTMCNC: 31.7 G/DL (ref 31–36)
MCV RBC AUTO: 100.5 FL (ref 80–100)
METHEMOGLOBIN ARTERIAL: 0.3 %
MONOCYTES ABSOLUTE: 0.9 K/UL (ref 0–1.3)
MONOCYTES RELATIVE PERCENT: 8.4 %
NEUTROPHILS ABSOLUTE: 9.2 K/UL (ref 1.7–7.7)
NEUTROPHILS RELATIVE PERCENT: 82.6 %
O2 SAT, ARTERIAL: 98.9 %
O2 THERAPY: ABNORMAL
PCO2 ARTERIAL: 41 MMHG (ref 35–45)
PDW BLD-RTO: 15 % (ref 12.4–15.4)
PERFORMED ON: ABNORMAL
PH ARTERIAL: 7.37 (ref 7.35–7.45)
PHOSPHORUS: 5.3 MG/DL (ref 2.5–4.9)
PLATELET # BLD: 214 K/UL (ref 135–450)
PMV BLD AUTO: 8.2 FL (ref 5–10.5)
PO2 ARTERIAL: 155.1 MMHG (ref 75–108)
POTASSIUM REFLEX MAGNESIUM: 4.4 MMOL/L (ref 3.5–5.1)
POTASSIUM SERPL-SCNC: 4.3 MMOL/L (ref 3.5–5.1)
RBC # BLD: 2.54 M/UL (ref 4–5.2)
SODIUM BLD-SCNC: 131 MMOL/L (ref 136–145)
TCO2 ARTERIAL: 24.4 MMOL/L
VANCOMYCIN RANDOM: 23 UG/ML
WBC # BLD: 11.2 K/UL (ref 4–11)

## 2023-01-22 PROCEDURE — 6370000000 HC RX 637 (ALT 250 FOR IP): Performed by: INTERNAL MEDICINE

## 2023-01-22 PROCEDURE — 85025 COMPLETE CBC W/AUTO DIFF WBC: CPT

## 2023-01-22 PROCEDURE — 82803 BLOOD GASES ANY COMBINATION: CPT

## 2023-01-22 PROCEDURE — 6370000000 HC RX 637 (ALT 250 FOR IP)

## 2023-01-22 PROCEDURE — 84100 ASSAY OF PHOSPHORUS: CPT

## 2023-01-22 PROCEDURE — 94640 AIRWAY INHALATION TREATMENT: CPT

## 2023-01-22 PROCEDURE — 2580000003 HC RX 258: Performed by: INTERNAL MEDICINE

## 2023-01-22 PROCEDURE — 71045 X-RAY EXAM CHEST 1 VIEW: CPT

## 2023-01-22 PROCEDURE — 94761 N-INVAS EAR/PLS OXIMETRY MLT: CPT

## 2023-01-22 PROCEDURE — 2700000000 HC OXYGEN THERAPY PER DAY

## 2023-01-22 PROCEDURE — 80048 BASIC METABOLIC PNL TOTAL CA: CPT

## 2023-01-22 PROCEDURE — 6360000002 HC RX W HCPCS: Performed by: INTERNAL MEDICINE

## 2023-01-22 PROCEDURE — 80202 ASSAY OF VANCOMYCIN: CPT

## 2023-01-22 PROCEDURE — 99233 SBSQ HOSP IP/OBS HIGH 50: CPT | Performed by: INTERNAL MEDICINE

## 2023-01-22 PROCEDURE — 83735 ASSAY OF MAGNESIUM: CPT

## 2023-01-22 PROCEDURE — 94003 VENT MGMT INPAT SUBQ DAY: CPT

## 2023-01-22 PROCEDURE — 2000000000 HC ICU R&B

## 2023-01-22 RX ORDER — MIDODRINE HYDROCHLORIDE 10 MG/1
10 TABLET ORAL
Status: DISCONTINUED | OUTPATIENT
Start: 2023-01-22 | End: 2023-01-24 | Stop reason: HOSPADM

## 2023-01-22 RX ADMIN — LORAZEPAM 0.5 MG: 0.5 TABLET ORAL at 19:57

## 2023-01-22 RX ADMIN — IPRATROPIUM BROMIDE AND ALBUTEROL SULFATE 3 ML: .5; 2.5 SOLUTION RESPIRATORY (INHALATION) at 03:23

## 2023-01-22 RX ADMIN — Medication 15 ML: at 11:56

## 2023-01-22 RX ADMIN — SERTRALINE HYDROCHLORIDE 50 MG: 50 TABLET ORAL at 11:56

## 2023-01-22 RX ADMIN — IPRATROPIUM BROMIDE AND ALBUTEROL SULFATE 3 ML: .5; 2.5 SOLUTION RESPIRATORY (INHALATION) at 11:23

## 2023-01-22 RX ADMIN — IPRATROPIUM BROMIDE AND ALBUTEROL SULFATE 3 ML: .5; 2.5 SOLUTION RESPIRATORY (INHALATION) at 19:55

## 2023-01-22 RX ADMIN — APIXABAN 2.5 MG: 5 TABLET, FILM COATED ORAL at 19:56

## 2023-01-22 RX ADMIN — SENNOSIDES AND DOCUSATE SODIUM 2 TABLET: 50; 8.6 TABLET ORAL at 19:58

## 2023-01-22 RX ADMIN — IPRATROPIUM BROMIDE AND ALBUTEROL SULFATE 3 ML: .5; 2.5 SOLUTION RESPIRATORY (INHALATION) at 23:29

## 2023-01-22 RX ADMIN — HYDROCORTISONE 2.5%: 25 CREAM TOPICAL at 19:58

## 2023-01-22 RX ADMIN — CEFEPIME 1000 MG: 1 INJECTION, POWDER, FOR SOLUTION INTRAMUSCULAR; INTRAVENOUS at 20:08

## 2023-01-22 RX ADMIN — IPRATROPIUM BROMIDE AND ALBUTEROL SULFATE 3 ML: .5; 2.5 SOLUTION RESPIRATORY (INHALATION) at 08:34

## 2023-01-22 RX ADMIN — MIDODRINE HYDROCHLORIDE 10 MG: 10 TABLET ORAL at 11:56

## 2023-01-22 RX ADMIN — SODIUM CHLORIDE, PRESERVATIVE FREE 10 ML: 5 INJECTION INTRAVENOUS at 19:59

## 2023-01-22 RX ADMIN — GABAPENTIN 200 MG: 100 CAPSULE ORAL at 20:00

## 2023-01-22 RX ADMIN — MIDAZOLAM 2 MG: 1 INJECTION INTRAMUSCULAR; INTRAVENOUS at 01:43

## 2023-01-22 RX ADMIN — MIDODRINE HYDROCHLORIDE 10 MG: 10 TABLET ORAL at 16:15

## 2023-01-22 RX ADMIN — ATORVASTATIN CALCIUM 40 MG: 40 TABLET, FILM COATED ORAL at 19:56

## 2023-01-22 RX ADMIN — SODIUM CHLORIDE, PRESERVATIVE FREE 10 ML: 5 INJECTION INTRAVENOUS at 11:57

## 2023-01-22 RX ADMIN — HYDROCORTISONE 2.5%: 25 CREAM TOPICAL at 03:55

## 2023-01-22 RX ADMIN — IPRATROPIUM BROMIDE AND ALBUTEROL SULFATE 3 ML: .5; 2.5 SOLUTION RESPIRATORY (INHALATION) at 14:13

## 2023-01-22 RX ADMIN — APIXABAN 2.5 MG: 5 TABLET, FILM COATED ORAL at 11:57

## 2023-01-22 RX ADMIN — MIDAZOLAM 2 MG: 1 INJECTION INTRAMUSCULAR; INTRAVENOUS at 05:38

## 2023-01-22 RX ADMIN — MIDAZOLAM 2 MG: 1 INJECTION INTRAMUSCULAR; INTRAVENOUS at 16:15

## 2023-01-22 RX ADMIN — HYDROCORTISONE 2.5%: 25 CREAM TOPICAL at 16:15

## 2023-01-22 RX ADMIN — Medication 15 ML: at 19:58

## 2023-01-22 ASSESSMENT — PULMONARY FUNCTION TESTS
PIF_VALUE: 13
PIF_VALUE: 18
PIF_VALUE: 21
PIF_VALUE: 23
PIF_VALUE: 18
PIF_VALUE: 20

## 2023-01-22 NOTE — PROGRESS NOTES
Patient anxious, heart rate elevated 140 not sustained. Patient continues to ask where she is and why she is here. Patient redirectable.

## 2023-01-22 NOTE — PROGRESS NOTES
01/21/23 1932   Patient Observation   Heart Rate 97   SpO2 100 %   Observations shiley 6 xlt distal   Breath Sounds   Right Upper Lobe Diminished   Right Middle Lobe Diminished   Right Lower Lobe Diminished   Left Upper Lobe Diminished   Left Lower Lobe Diminished   Vent Information   Vent Mode AC/VC   Ventilator Settings   FiO2  60 %   Vt (Set, mL) 300 mL   Resp Rate (Set) 18 bmp   PEEP/CPAP (cmH2O) 8   Vent Patient Data (Readings)   Vt (Measured) 341 mL   Peak Inspiratory Pressure (cmH2O) 29 cmH2O   Rate Measured 19 br/min   Minute Volume (L/min) 5.7 Liters   Mean Airway Pressure (cmH2O) 13 cmH20   I:E Ratio 1:2.5   Static Compliance (L/cm H2O) 29   Dynamic Compliance (L/cm H2O) 21 L/cm H2O   Vent Alarm Settings   High Pressure (cmH2O) 50 cmH2O   Low Minute Volume (lpm) 2 L/min   Low Exhaled Vt (ml) 190 mL   RR High (bpm) 45 br/min   Apnea (secs) 20 secs   Additional Respiratoray Assessments   Humidification Source Heated wire   Humidification Temp 37   Circuit Condensation Drained   Ambu Bag With Mask At Bedside Yes   Backup Trachs Available (Size) 4.0;6.0   Airway Clearance   Suction Trach   Suction Device Inline suction catheter   Sputum Amount Moderate   Sputum Color/Odor Yellow   Sputum Consistency Thick

## 2023-01-22 NOTE — PROGRESS NOTES
9358  Shift assessment, completed, see flow sheet. Pt is alert and oriented to self and place only, continues to ask \"why am I here? \", \"what happened to me?\". Following commands. Continues with trach/vent at this time. Respirations are easy, even, and unlabored. Bilateral lung sounds are diminished. Continues to be NPO. PIV x1 and R femoral line remain in place and patent with sites and dressings C/D/I. Continues to be anureic. Call light within reach. Bed in lowest position. Bed alarm on. Will continue to monitor. [facility] rounds completed with Dr. Roman Child and multidisciplinary team. Reviewed labs, meds, VS, assessment, & plan of care for today. See dictated note and new orders for details. New orders received:  -continue to titrate levophed off as tolerated. -ST to eval/tx if indicated tomorrow. Dr. Muñoz Burn rounding as well and ordered for pt to receive midodrine 10mg PO TID in attempt to stop levophed. 1227  Reassessment completed, see flowsheets, unchanged from prior. VSS. All ICU lines and monitoring remain in place. Bed locked in lowest position. Call light within reach. 1647  Reassessment completed, see flowsheets, unchanged from prior. VSS. All ICU lines and monitoring remain in place. Bed locked in lowest position. Call light within reach.

## 2023-01-22 NOTE — PROGRESS NOTES
Vancomycin Day 4  Vanc random this AM = 23 mcg/ml. No dialysis today so no vancomycin today either. Next level scheduled for 1/24 before her next dialysis session.

## 2023-01-22 NOTE — PROGRESS NOTES
01/22/23 0324   Patient Observation   Heart Rate (!) 112   Resp 17   SpO2 100 %   Observations shiley 6 xlt distal   Breath Sounds   Right Upper Lobe Diminished   Right Middle Lobe Diminished   Right Lower Lobe Diminished   Left Upper Lobe Diminished   Left Lower Lobe Diminished   Vent Information   Vent Mode AC/VC   Ventilator Settings   FiO2  50 %   Vt (Set, mL) 300 mL   Resp Rate (Set) 18 bmp   PEEP/CPAP (cmH2O) 8   Vent Patient Data (Readings)   Vt (Measured) 300 mL   Peak Inspiratory Pressure (cmH2O) 23 cmH2O   Rate Measured 19 br/min   Minute Volume (L/min) 5.6 Liters   Mean Airway Pressure (cmH2O) 12 cmH20   I:E Ratio 1:2.5   Vent Alarm Settings   High Pressure (cmH2O) 50 cmH2O   Low Minute Volume (lpm) 2 L/min   Low Exhaled Vt (ml) 190 mL   RR High (bpm) 45 br/min   Apnea (secs) 20 secs   Additional Respiratoray Assessments   Humidification Source Heated wire   Humidification Temp 37   Circuit Condensation Drained   Ambu Bag With Mask At Bedside Yes   Backup Trachs Available (Size) 4.0;6.0   Airway Clearance   Suction Trach   Suction Device Inline suction catheter   Sputum Amount Moderate   Sputum Color/Odor Yellow   Sputum Consistency Thick

## 2023-01-22 NOTE — PROGRESS NOTES
Nephrology Progress Note   [email or web address]      Sub/interval history  Patient is intubated and is on 4 mics of levo at ICU  Had aspiration with grape and needed bronchoscopy and intubation on 1/21     HD on 1/21 with net 3.4 L UF, post weight 130.5 kg [her target weight is listed at 122 kg]    ROS: Unable to obtain  PSFH: No visitor    Scheduled Meds:   midodrine  10 mg Oral TID WC    [START ON 1/24/2023] epoetin claire-epbx  10,000 Units IntraVENous Once per day on Tue Thu Sat    gabapentin  200 mg Oral Nightly    sodium chloride  500 mL IntraVENous Once    sertraline  50 mg Oral Daily    sennosides-docusate sodium  2 tablet Oral Nightly    pantoprazole  40 mg Oral QAM AC    midodrine  10 mg Oral Once per day on Mon Wed Fri    [Held by provider] metoprolol tartrate  25 mg Oral BID    ipratropium-albuterol  1 vial Inhalation Q4H    chlorhexidine  15 mL Mouth/Throat BID    doxercalciferol  3 mcg IntraVENous Once per day on Tue Thu Sat    cefepime  1,000 mg IntraVENous Q24H    apixaban  2.5 mg Oral BID    atorvastatin  40 mg Oral Nightly    hydrocortisone   Rectal BID    sodium chloride flush  5-40 mL IntraVENous 2 times per day    vancomycin (VANCOCIN) intermittent dosing (placeholder)   Other RX Placeholder     Continuous Infusions:   dexmedetomidine HCl in NaCl      norepinephrine 4 mcg/min (01/22/23 0627)    sodium chloride       PRN Meds:.oxyCODONE, midazolam, fentanNYL, carboxymethylcellulose PF, LORazepam, sodium chloride flush, sodium chloride, ondansetron **OR** ondansetron, polyethylene glycol, acetaminophen **OR** acetaminophen, ipratropium-albuterol    Objective/     Vitals:    01/22/23 0645 01/22/23 0700 01/22/23 0834 01/22/23 0900   BP: (!) 125/108 111/86 (!) 112/57 96/73   Pulse: (!) 121 (!) 137 (!) 108 (!) 112   Resp: 22 16 17 18   Temp:   99.4 °F (37.4 °C)    TempSrc:   Axillary    SpO2: 100% 100% 100% 100%   Weight:       Height:         24HR INTAKE/OUTPUT:    Intake/Output Summary (Last 24 hours) at 1/22/2023 1018  Last data filed at 1/22/2023 9804  Gross per 24 hour   Intake 626.99 ml   Output --   Net 626.99 ml       Gen: Sedated, intubated  Neck: No JVD  Skin: Unremarkable  Cardiovascular:  S1, S2 without m/r/g   Respiratory: CTA B without w/r/r; respiratory effort normal  Abdomen:  soft, nt, nd,   Extremities: No lower extremity edema, edema noted on right upper extremity along with increased abdominal girth  Neuro/Psy: CT.    Data/  Recent Labs     01/20/23  0451 01/21/23  0500 01/22/23  0347   WBC 13.9* 7.6 11.2*   HGB 8.2* 7.9* 8.1*   HCT 25.5* 25.4* 25.5*   .3* 103.8* 100.5*    187 214       Recent Labs     01/20/23  0451 01/21/23  0500 01/22/23  0347   * 135* 131*   K 5.1 4.9 4.3  4.4   CL 96* 97* 93*   CO2 22 25 26   GLUCOSE 133* 92 124*   PHOS  --   --  5.3*   MG  --   --  1.90   BUN 63* 66* 44*   CREATININE 5.4* 5.9* 4.5*   LABGLOM 8* 7* 10*       IMPRESSION/RECOMMENDATIONS:       ESKD. - On HD at [facility]. Last HD prior to admission was on 1/19/23   - Vascular access: RUE AVF  - EDW: 122.5 kg [was 130.5 kg on 1/21]    Acute on chronic respiratory failure   From aspiration of grape on 1/21  Being treated for HCAP as well per pulmonary  status post bronchoscopy and intubation on 1/21  Renal dose vancomycin, level noted to be 23 on 1/21    Hypotension  Wean off Levophed, add midodrine  Received 500 mL of normal saline bolus on 1/21     Hyperkalemia.  - Potassium level here in the hospital is within normal limit. - Low K+ diet. Anemia.  - Continue KOKO with HD.  -Continue Retacrit to 10,000 units with each dialysis     CKD-MBD.  - Continue Hectorol and calcium acetate. Yamileth Cruz MD  Office: [phone]  Fax:    [phone] [address] [facility]

## 2023-01-22 NOTE — PROGRESS NOTES
RT Inhaler-Nebulizer Bronchodilator Protocol Note    There is a bronchodilator order in the chart from a provider indicating to follow the RT Bronchodilator Protocol and there is an Initiate RT Inhaler-Nebulizer Bronchodilator Protocol order as well (see protocol at bottom of note). CXR Findings:  XR CHEST PORTABLE    Result Date: 1/22/2023  No significant interval change in multifocal bilateral airspace disease, greatest at the right base. XR CHEST PORTABLE    Result Date: 1/21/2023  Worsened consolidation in the right lower lobe. Findings are concerning for pneumonia. Background of moderate worsened pulmonary edema. The findings from the last RT Protocol Assessment were as follows:   History Pulmonary Disease: (P) Smoker 15 pack years or more  Respiratory Pattern: (P) Dyspnea on exertion or RR 21-25 bpm  Breath Sounds: (P) Slightly diminished and/or crackles  Cough: (P) Strong, productive  Indication for Bronchodilator Therapy: (P) On home bronchodilators  Bronchodilator Assessment Score: (P) 6    Aerosolized bronchodilator medication orders have been revised according to the RT Inhaler-Nebulizer Bronchodilator Protocol below. Respiratory Therapist to perform RT Therapy Protocol Assessment initially then follow the protocol. Repeat RT Therapy Protocol Assessment PRN for score 0-3 or on second treatment, BID, and PRN for scores above 3. No Indications - adjust the frequency to every 6 hours PRN wheezing or bronchospasm, if no treatments needed after 48 hours then discontinue using Per Protocol order mode. If indication present, adjust the RT bronchodilator orders based on the Bronchodilator Assessment Score as indicated below.   Use Inhaler orders unless patient has one or more of the following: on home nebulizer, not able to hold breath for 10 seconds, is not alert and oriented, cannot activate and use MDI correctly, or respiratory rate 25 breaths per minute or more, then use the equivalent nebulizer order(s) with same Frequency and PRN reasons based on the score. If a patient is on this medication at home then do not decrease Frequency below that used at home. 0-3 - enter or revise RT bronchodilator order(s) to equivalent RT Bronchodilator order with Frequency of every 4 hours PRN for wheezing or increased work of breathing using Per Protocol order mode. 4-6 - enter or revise RT Bronchodilator order(s) to two equivalent RT bronchodilator orders with one order with BID Frequency and one order with Frequency of every 4 hours PRN wheezing or increased work of breathing using Per Protocol order mode. 7-10 - enter or revise RT Bronchodilator order(s) to two equivalent RT bronchodilator orders with one order with TID Frequency and one order with Frequency of every 4 hours PRN wheezing or increased work of breathing using Per Protocol order mode. 11-13 - enter or revise RT Bronchodilator order(s) to one equivalent RT bronchodilator order with QID Frequency and an Albuterol order with Frequency of every 4 hours PRN wheezing or increased work of breathing using Per Protocol order mode. Greater than 13 - enter or revise RT Bronchodilator order(s) to one equivalent RT bronchodilator order with every 4 hours Frequency and an Albuterol order with Frequency of every 2 hours PRN wheezing or increased work of breathing using Per Protocol order mode.          Electronically signed by Chey Nice RCP on 1/22/2023 at 10:12 AM

## 2023-01-22 NOTE — PROGRESS NOTES
01/21/23 1900   RT Protocol   History Pulmonary Disease 1   Respiratory pattern 2   Breath sounds 2   Cough 1   Indications for Bronchodilator Therapy On home bronchodilators   Bronchodilator Assessment Score 6   RT Inhaler-Nebulizer Bronchodilator Protocol Note    There is a bronchodilator order in the chart from a provider indicating to follow the RT Bronchodilator Protocol and there is an Initiate RT Inhaler-Nebulizer Bronchodilator Protocol order as well (see protocol at bottom of note). CXR Findings:  XR CHEST PORTABLE    Result Date: 1/21/2023  Worsened consolidation in the right lower lobe. Findings are concerning for pneumonia. Background of moderate worsened pulmonary edema. The findings from the last RT Protocol Assessment were as follows:   History Pulmonary Disease: Smoker 15 pack years or more  Respiratory Pattern: Dyspnea on exertion or RR 21-25 bpm  Breath Sounds: Slightly diminished and/or crackles  Cough: Strong, productive  Indication for Bronchodilator Therapy: On home bronchodilators  Bronchodilator Assessment Score: 6    Aerosolized bronchodilator medication orders have been revised according to the RT Inhaler-Nebulizer Bronchodilator Protocol below. Respiratory Therapist to perform RT Therapy Protocol Assessment initially then follow the protocol. Repeat RT Therapy Protocol Assessment PRN for score 0-3 or on second treatment, BID, and PRN for scores above 3. No Indications - adjust the frequency to every 6 hours PRN wheezing or bronchospasm, if no treatments needed after 48 hours then discontinue using Per Protocol order mode. If indication present, adjust the RT bronchodilator orders based on the Bronchodilator Assessment Score as indicated below.   Use Inhaler orders unless patient has one or more of the following: on home nebulizer, not able to hold breath for 10 seconds, is not alert and oriented, cannot activate and use MDI correctly, or respiratory rate 25 breaths per minute or more, then use the equivalent nebulizer order(s) with same Frequency and PRN reasons based on the score. If a patient is on this medication at home then do not decrease Frequency below that used at home. 0-3 - enter or revise RT bronchodilator order(s) to equivalent RT Bronchodilator order with Frequency of every 4 hours PRN for wheezing or increased work of breathing using Per Protocol order mode. 4-6 - enter or revise RT Bronchodilator order(s) to two equivalent RT bronchodilator orders with one order with BID Frequency and one order with Frequency of every 4 hours PRN wheezing or increased work of breathing using Per Protocol order mode. 7-10 - enter or revise RT Bronchodilator order(s) to two equivalent RT bronchodilator orders with one order with TID Frequency and one order with Frequency of every 4 hours PRN wheezing or increased work of breathing using Per Protocol order mode. 11-13 - enter or revise RT Bronchodilator order(s) to one equivalent RT bronchodilator order with QID Frequency and an Albuterol order with Frequency of every 4 hours PRN wheezing or increased work of breathing using Per Protocol order mode. Greater than 13 - enter or revise RT Bronchodilator order(s) to one equivalent RT bronchodilator order with every 4 hours Frequency and an Albuterol order with Frequency of every 2 hours PRN wheezing or increased work of breathing using Per Protocol order mode.        Electronically signed by Dixon Vizcarra RCP on 1/21/2023 at 7:44 PM

## 2023-01-22 NOTE — PLAN OF CARE
Problem: Discharge Planning  Goal: Discharge to home or other facility with appropriate resources  Outcome: Progressing  Flowsheets (Taken 1/21/2023 2000)  Discharge to home or other facility with appropriate resources: Identify barriers to discharge with patient and caregiver     Problem: Pain  Goal: Verbalizes/displays adequate comfort level or baseline comfort level  Outcome: Progressing     Problem: Safety - Adult  Goal: Free from fall injury  Outcome: Progressing     Problem: ABCDS Injury Assessment  Goal: Absence of physical injury  Outcome: Progressing     Problem: Skin/Tissue Integrity  Goal: Absence of new skin breakdown  Description: 1. Monitor for areas of redness and/or skin breakdown  2. Assess vascular access sites hourly  3. Every 4-6 hours minimum:  Change oxygen saturation probe site  4. Every 4-6 hours:  If on nasal continuous positive airway pressure, respiratory therapy assess nares and determine need for appliance change or resting period. Outcome: Progressing     Problem: Confusion  Goal: Confusion, delirium, dementia, or psychosis is improved or at baseline  Description: INTERVENTIONS:  1. Assess for possible contributors to thought disturbance, including medications, impaired vision or hearing, underlying metabolic abnormalities, dehydration, psychiatric diagnoses, and notify attending LIP  2. [place] high risk fall precautions, as indicated  3. Provide frequent short contacts to provide reality reorientation, refocusing and direction  4. Decrease environmental stimuli, including noise as appropriate  5. Monitor and intervene to maintain adequate nutrition, hydration, elimination, sleep and activity  6. If unable to ensure safety without constant attention obtain sitter and review sitter guidelines with assigned personnel  7.  Initiate Psychosocial CNS and Spiritual Care consult, as indicated  Outcome: Progressing     Problem: Chronic Conditions and Co-morbidities  Goal: Patient's chronic conditions and co-morbidity symptoms are monitored and maintained or improved  Outcome: Progressing  Flowsheets (Taken 1/21/2023 2000)  Care Plan - Patient's Chronic Conditions and Co-Morbidity Symptoms are Monitored and Maintained or Improved: Monitor and assess patient's chronic conditions and comorbid symptoms for stability, deterioration, or improvement

## 2023-01-22 NOTE — PROGRESS NOTES
IM Progress Note    Admit Date:  1/19/2023  3    Interval history:  chronic trach, capped  on 2 L , presented from [facility] for sob and hyperkalemia  Worsened resp distress needing ICU transfer, placed on vent, had bronch with therapeutic aspiration , central line placement    Subjective:  Ms. Skinner on vent support , awake, alert and oriented, hypotensive on levophed  Feels some better  Nasal swab with MRSA +    Objective:   BP (!) 125/108   Pulse (!) 121   Temp 99.1 °F (37.3 °C) (Oral)   Resp 22   Ht 5' 7\" (1.702 m)   Wt 294 lb 1.6 oz (133.4 kg)   SpO2 100%   BMI 46.06 kg/m²     Intake/Output Summary (Last 24 hours) at 1/22/2023 0723  Last data filed at 1/22/2023 0627  Gross per 24 hour   Intake 1026.99 ml   Output 3800 ml   Net -2773.01 ml         Physical Exam:     General:  elderly AA female, obese  up in bed, on vent support  Awake, alert and well oriented today   Mucous Membranes:  Pink , anicteric  Tracheostomy status with vent connected   Neck: No JVD, no carotid bruit, no thyromegaly  Chest: improved jackie air entry  diminished in bases  Cardiovascular:  RRR S1S2 heard, no murmurs or gallops  Abdomen:  Soft, obese, undistended, non tender, no organomegaly, BS present  Extremities: right UE AV fistula, chronic atrophic ext   No edema or cyanosis. Distal pulses well felt  Right femoral TLC  Neurological - bed bound, non ambulatory, awake, alert and oriented       Medications:   Scheduled Medications:    [START ON 1/24/2023] epoetin claire-epbx  10,000 Units IntraVENous Once per day on Tue Thu Sat    gabapentin  200 mg Oral Nightly    sodium chloride  500 mL IntraVENous Once    sertraline  50 mg Oral Daily    sennosides-docusate sodium  2 tablet Oral Nightly    pantoprazole  40 mg Oral QAM AC    midodrine  10 mg Oral Once per day on Mon Wed Fri    [Held by provider] metoprolol tartrate  25 mg Oral BID    ipratropium-albuterol  1 vial Inhalation Q4H    chlorhexidine  15 mL Mouth/Throat BID    doxercalciferol   3 mcg IntraVENous Once per day on Tue Thu Sat    cefepime  1,000 mg IntraVENous Q24H    apixaban  2.5 mg Oral BID    atorvastatin  40 mg Oral Nightly    hydrocortisone   Rectal BID    sodium chloride flush  5-40 mL IntraVENous 2 times per day    vancomycin (VANCOCIN) intermittent dosing (placeholder)   Other RX Placeholder     I   dexmedetomidine HCl in NaCl      norepinephrine 4 mcg/min (01/22/23 0627)    sodium chloride       oxyCODONE, midazolam, fentanNYL, carboxymethylcellulose PF, LORazepam, sodium chloride flush, sodium chloride, ondansetron **OR** ondansetron, polyethylene glycol, acetaminophen **OR** acetaminophen, ipratropium-albuterol    Lab Data:  Recent Labs     01/20/23  0451 01/21/23  0500 01/22/23  0347   WBC 13.9* 7.6 11.2*   HGB 8.2* 7.9* 8.1*   HCT 25.5* 25.4* 25.5*   .3* 103.8* 100.5*    187 214       Recent Labs     01/20/23  0451 01/21/23  0500 01/22/23  0347   * 135* 131*   K 5.1 4.9 4.4   CL 96* 97* 93*   CO2 22 25 26   PHOS  --   --  5.3*   BUN 63* 66* 44*   CREATININE 5.4* 5.9* 4.5*       Recent Labs     01/19/23  1514 01/19/23  1656   TROPONINI 0.24* 0.20*         Coagulation:   Lab Results   Component Value Date/Time    INR 1.33 05/01/2022 08:40 AM    APTT 34.2 04/27/2022 05:24 AM     Cardiac markers:   Lab Results   Component Value Date/Time    TROPONINI 0.20 01/19/2023 04:56 PM         Lab Results   Component Value Date    ALT 11 01/19/2023    AST 12 (L) 01/19/2023    ALKPHOS 89 01/19/2023    BILITOT 0.7 01/19/2023       Lab Results   Component Value Date    INR 1.33 (H) 05/01/2022    INR 1.36 (H) 05/01/2022    INR 1.22 (H) 04/30/2022    PROTIME 15.2 (H) 05/01/2022    PROTIME 15.6 (H) 05/01/2022    PROTIME 13.9 (H) 04/30/2022       Radiology    CULTURES  Blood cultures pending  COVID and Flu not detected   BAL pending       EKG: JAN-2023 14:16:28 [facility] ROUTINE RECORD  Sinus rhythm with 1st degree A-V block  Low voltage QRS  Cannot rule out Anterior infarct (cited on or before 19-JAN-2023)  Abnormal ECG  When compared with ECG of 27-OCT-2022 07:18,  No significant change was found  Confirmed by Rivera Steinberg MD, [facility] ([id number]) on 1/19/2023 4:54:06 PM     RADIOLOGY  XR CHEST PORTABLE   Final Result   1. Right basilar airspace disease. This could reflect atelectasis versus   pneumonia in the appropriate clinical setting. 2.  Mild cardiomegaly               Bronchoscopy     A complete airway inspection was performed. Large foreign body occluding right mainstem. Airway retrieval basket was used and the RLL foreign body was extracted in total.  Bilateral thick, purulent secretions bilateral airways with mucous plugs. Saline injection followed by a therapeutic aspiration was performed. Washings were obtained throughout the airways. A Bronchoalveolar lavage was obtained from the RLL with good return     Pertinent previous results reviewed   Limited echocardiogram 10/27/2022  Summary  Limited only f/u for LVEF. LV systolic function is mildly reduced with a visually estimated EF of 45-50%. No obvious segmental wall motion abnormality.      ASSESSMENT/PLAN:     # HCAP  #Acute on Chronic hypoxic respiratory failure with tracheostomy     -s/p tracheostomy over 1 year ago, on trach collar with 2 L O2 at baseline  -pt with hx of MDRO, MRSA, pseudomonas   -procalc, WBC elevated, no fevers, BP stable      -CXR as above with atelectasis vs pna , MRSA nasal swab +ve   -COVID and Flu not detected   -pulmonology  following      -Blood cultures pending   -Worsened resp distress needing ICU transfer, placed on vent, had bronch with therapeutic aspiration for foreign body ( pill ) and mucous plugs - improving slowly   - continue on IV vancomycin and cefepime   - await BAL    #ESRD on HD   #Hyperkalemia   -reportedly potassium of 7.0 after dialysis   -5.1 on recheck   -dialysis T, Thurs, Sat schedule   -continue home meds   -nephrology consulted and resumed HD     #Elevated troponin #CAD  -0.20, chronically elevated   -ASA, statin, BB   -at chem stress test during Sept admission 2022, cards recommended medical management   -no acute ischemic EKG changes  -no CP      #Hypotension   -on midodrine 3x weekly   -holding lopressor - now on pressor support. Wean off   - consider IVF boluses     #Chronic macrocyctic anemia   -Hgb slightly lower than baseline   -continue to monitor   -no s/s of acute bleeding      #Chronic diastolic HF   -last echo as above   -does not appear acutely decompensated      #Hyponatremia   --improved     #DM type 2  -no home regimen   -BG stable today   -continue to monitor      #Atrial fibrillation   Hx of VTE   -NSR on presentation   -on eliquis for [facility]         #Hx of CVA   -on eliquis  -ASA, statin         #Anxiety   #Depression   -ativan prn - hold   -on zoloft      #GERD   -PPI      #Chronic pain   -on gabapentin, roxicodone     DVT Prophylaxis: eliquis   Diet: ADULT DIET;  Regular; Low Potassium (Less than 3000 mg/day)  Code Status: Full Code         Barrett Guardado MD, 1/22/2023 7:23 AM

## 2023-01-22 NOTE — PROGRESS NOTES
Pulmonary & Critical Care Medicine ICU Progress Note    CC: Respiratory failure    Events of Last 24 hours:   Levophed 2 mcg/min   Off sedation   PEEP 8  FiO2 40%        Vascular lines: IV: Right femoral line     MV: -current titrate off ventilator at baseline  Vent Mode: AC/VC Resp Rate (Set): 18 bmp/Vt (Set, mL): 300 mL/ /FiO2 : 50 %  Recent Labs     23  1545 23  0510   PHART 7.308* 7.370   [id number] 55.2* 41.0   PO2ART 238.9* 155.1*       IV:   dexmedetomidine HCl in NaCl      norepinephrine 4 mcg/min (23 06)    sodium chloride         Vitals:  Blood pressure 111/86, pulse (!) 137, temperature 99.1 °F (37.3 °C), temperature source Oral, resp. rate 16, height 5' 7\" (1.702 m), weight 294 lb 1.6 oz (133.4 kg), SpO2 100 %. on AC    Temp  Av.9 °F (36.6 °C)  Min: 97.1 °F (36.2 °C)  Max: 99.1 °F (37.3 °C)    Intake/Output Summary (Last 24 hours) at 2023 0803  Last data filed at 2023 5650  Gross per 24 hour   Intake 1026.99 ml   Output 3800 ml   Net -2773.01 ml     PE:  Gen: On mechanical ventilation  Ill-appearing  Eyes: PERRL. No sclera icterus. No conjunctival injection. ENT: No discharge. Pharynx clear. Tracheostomy tube in place  Neck: Trachea midline. No obvious mass. Resp: No accessory muscle use. Few  crackles. No wheezes. No rhonchi. No dullness on percussion. CV: Regular rate. Regular rhythm. No murmur or rub.  1 + LE edema. GI: Non-tender. Non-distended. No hernia. Skin: Warm and dry. No nodule on exposed extremities. Lymph: No cervical LAD. No supraclavicular LAD. M/S: No cyanosis. No joint deformity. No clubbing. Right arm fistula  Neuro: Alert awake. Followed commands. Psych: No agitation.   No anxiety    Scheduled Meds:   [START ON 2023] epoetin claire-epbx  10,000 Units IntraVENous Once per day on Tue Thu Sat    gabapentin  200 mg Oral Nightly    sodium chloride  500 mL IntraVENous Once    sertraline  50 mg Oral Daily sennosides-docusate sodium  2 tablet Oral Nightly    pantoprazole  40 mg Oral QAM AC    midodrine  10 mg Oral Once per day on Mon Wed Fri    [Held by provider] metoprolol tartrate  25 mg Oral BID    ipratropium-albuterol  1 vial Inhalation Q4H    chlorhexidine  15 mL Mouth/Throat BID    doxercalciferol  3 mcg IntraVENous Once per day on Tue Thu Sat    cefepime  1,000 mg IntraVENous Q24H    apixaban  2.5 mg Oral BID    atorvastatin  40 mg Oral Nightly    hydrocortisone   Rectal BID    sodium chloride flush  5-40 mL IntraVENous 2 times per day    vancomycin (VANCOCIN) intermittent dosing (placeholder)   Other RX Placeholder       Data:  CBC:   Recent Labs     01/20/23  0451 01/21/23  0500 01/22/23  0347   WBC 13.9* 7.6 11.2*   HGB 8.2* 7.9* 8.1*   HCT 25.5* 25.4* 25.5*   .3* 103.8* 100.5*    187 214     BMP:   Recent Labs     01/20/23  0451 01/21/23  0500 01/22/23  0347   * 135* 131*   K 5.1 4.9 4.4   CL 96* 97* 93*   CO2 22 25 26   PHOS  --   --  5.3*   BUN 63* 66* 44*   CREATININE 5.4* 5.9* 4.5*     LIVER PROFILE:   Recent Labs     01/19/23  1514   AST 12*   ALT 11   BILITOT 0.7   ALKPHOS 80       Microbiology:  1/19 BC NGTD  1/19 COVID-19 not detected  1/20 tracheal aspirate  1/21 BAL/washing     Imaging:  Chest x-ray 1/22 imaging was reviewed by me and showed   RLL ASD   Mild cardiomegaly     ASSESSMENT:  Acute hypoxemic hypercapnic respiratory failure  Acute encephalopathy/metabolic encephalopathy  Healthcare associated pneumonia/aspiration pneumonia  RLL foreign body removal 1/21/2023  Septic shock  Chronic tracheostomy -history of chronic respiratory failure requiring continuous mechanical ventilation through tracheostomy  ESRD on HD  Systolic CHF EF 17%  History of SILVER and pulmonary hypertension  Prior history of Proteus, Acinetobacter, Pseudomonas pneumonia  History of A. fib, VTE and CVA on Eliquis  Difficult IV access-not able to thread wire during right IJ attempt     PLAN:  Mechanical ventilation as per my orders. The ventilator was adjusted by me at the bedside for unstable, life threatening respiratory failure  Follow ABG and chest x-ray while on the ventilator  Supplemental oxygen to maintain SaO2 >92%; wean as tolerated  Off IV Precedex   Fentanyl and Versed PRN, gtt as needed  Head of bed 30 degrees or higher at all times  Daily spontaneous breathing trial once PEEP less than 8, FiO2 less than 55%  IV antibiotics to include vancomycin and cefepime day #3  Monitor vancomycin level for toxicity  IV Levophed to keep MAP > 65 mmHg or SBP>90   HD per nephrology  Follow up on Bronch/Foreign body   BM regiment   Remove right femoral line when no longer needed  Blood sugar control ISS, with goal 150-180  GI prophylaxis: Pepcid  DVT prophylaxis: Eliquis  MRSA prophylaxis: Bactroban  Discussed with internal medicine          Total critical care time caring for this patient with life threatening, unstable organ failure, including direct patient contact, management of life support systems, review of data including imaging and labs, discussions with other team members and physicians is 33 minutes, excluding procedures.

## 2023-01-22 NOTE — PROGRESS NOTES
Shift assessment, completed, see flow sheet. Pt is alert and oriented x 1-2 with confusion. Following commands. Trach to vent   On AC 18 / 300 /50 %/8. CVC, WNL with levophed infusing at 9 mcg/min. Call light within reach. Bed in lowest position. Bed alarm on.

## 2023-01-22 NOTE — PROGRESS NOTES
01/21/23 2326   Patient Observation   Heart Rate 77   Resp 18   SpO2 100 %   Observations shiley 6 xlt distal   Breath Sounds   Right Upper Lobe Diminished   Right Middle Lobe Diminished   Right Lower Lobe Diminished   Left Upper Lobe Diminished   Left Lower Lobe Diminished   Vent Information   Vent Mode AC/VC   Ventilator Settings   FiO2  50 %   Vt (Set, mL) 300 mL   Resp Rate (Set) 18 bmp   PEEP/CPAP (cmH2O) 8   Vent Patient Data (Readings)   Vt (Measured) 300 mL   Peak Inspiratory Pressure (cmH2O) 21 cmH2O   Rate Measured 18 br/min   Minute Volume (L/min) 5.2 Liters   Mean Airway Pressure (cmH2O) 12 cmH20   I:E Ratio 1:2.5   Vent Alarm Settings   High Pressure (cmH2O) 50 cmH2O   Low Minute Volume (lpm) 2 L/min   Low Exhaled Vt (ml) 190 mL   RR High (bpm) 45 br/min   Apnea (secs) 20 secs   Additional Respiratoray Assessments   Humidification Source Heated wire   Humidification Temp 37   Circuit Condensation Drained   Ambu Bag With Mask At Bedside Yes   Backup Trachs Available (Size) 4.0;6.0   Airway Clearance   Suction Trach   Suction Device Inline suction catheter   Sputum Amount Moderate   Sputum Color/Odor Yellow   Sputum Consistency Thick

## 2023-01-23 LAB
ANION GAP SERPL CALCULATED.3IONS-SCNC: 16 MMOL/L (ref 3–16)
BASE EXCESS ARTERIAL: -2.6 MMOL/L (ref -3–3)
BASE EXCESS ARTERIAL: 0.2 MMOL/L (ref -3–3)
BASOPHILS ABSOLUTE: 0 K/UL (ref 0–0.2)
BASOPHILS RELATIVE PERCENT: 0.2 %
BLOOD CULTURE, ROUTINE: NORMAL
BUN BLDV-MCNC: 53 MG/DL (ref 7–20)
CALCIUM SERPL-MCNC: 8.4 MG/DL (ref 8.3–10.6)
CARBOXYHEMOGLOBIN ARTERIAL: 0.8 % (ref 0–1.5)
CARBOXYHEMOGLOBIN ARTERIAL: 1.2 % (ref 0–1.5)
CHLORIDE BLD-SCNC: 92 MMOL/L (ref 99–110)
CO2: 23 MMOL/L (ref 21–32)
CREAT SERPL-MCNC: 5.8 MG/DL (ref 0.6–1.2)
CULTURE, BLOOD 2: NORMAL
CULTURE, RESPIRATORY: NORMAL
CULTURE, RESPIRATORY: NORMAL
EOSINOPHILS ABSOLUTE: 0.2 K/UL (ref 0–0.6)
EOSINOPHILS RELATIVE PERCENT: 1.6 %
GFR SERPL CREATININE-BSD FRML MDRD: 7 ML/MIN/{1.73_M2}
GLUCOSE BLD-MCNC: 114 MG/DL (ref 70–99)
GLUCOSE BLD-MCNC: 115 MG/DL (ref 70–99)
GLUCOSE BLD-MCNC: 119 MG/DL (ref 70–99)
GLUCOSE BLD-MCNC: 120 MG/DL (ref 70–99)
GLUCOSE BLD-MCNC: 131 MG/DL (ref 70–99)
GRAM STAIN RESULT: NORMAL
GRAM STAIN RESULT: NORMAL
HCO3 ARTERIAL: 21.8 MMOL/L (ref 21–29)
HCO3 ARTERIAL: 27.1 MMOL/L (ref 21–29)
HCT VFR BLD CALC: 24.4 % (ref 36–48)
HEMOGLOBIN, ART, EXTENDED: 8.4 G/DL (ref 12–16)
HEMOGLOBIN, ART, EXTENDED: 8.8 G/DL (ref 12–16)
HEMOGLOBIN: 7.7 G/DL (ref 12–16)
LYMPHOCYTES ABSOLUTE: 1.1 K/UL (ref 1–5.1)
LYMPHOCYTES RELATIVE PERCENT: 9.3 %
MAGNESIUM: 2 MG/DL (ref 1.8–2.4)
MCH RBC QN AUTO: 31.7 PG (ref 26–34)
MCHC RBC AUTO-ENTMCNC: 31.7 G/DL (ref 31–36)
MCV RBC AUTO: 100.2 FL (ref 80–100)
METHEMOGLOBIN ARTERIAL: 0.3 %
METHEMOGLOBIN ARTERIAL: 0.3 %
MONOCYTES ABSOLUTE: 1 K/UL (ref 0–1.3)
MONOCYTES RELATIVE PERCENT: 8 %
NEUTROPHILS ABSOLUTE: 9.9 K/UL (ref 1.7–7.7)
NEUTROPHILS RELATIVE PERCENT: 80.9 %
O2 SAT, ARTERIAL: 50.9 %
O2 SAT, ARTERIAL: 91.6 %
O2 THERAPY: ABNORMAL
O2 THERAPY: ABNORMAL
PCO2 ARTERIAL: 35.6 MMHG (ref 35–45)
PCO2 ARTERIAL: 57.2 MMHG (ref 35–45)
PDW BLD-RTO: 15.3 % (ref 12.4–15.4)
PERFORMED ON: ABNORMAL
PH ARTERIAL: 7.29 (ref 7.35–7.45)
PH ARTERIAL: 7.41 (ref 7.35–7.45)
PHOSPHORUS: 5.6 MG/DL (ref 2.5–4.9)
PLATELET # BLD: 219 K/UL (ref 135–450)
PMV BLD AUTO: 8.6 FL (ref 5–10.5)
PO2 ARTERIAL: 30.7 MMHG (ref 75–108)
PO2 ARTERIAL: 60.5 MMHG (ref 75–108)
POTASSIUM REFLEX MAGNESIUM: 4 MMOL/L (ref 3.5–5.1)
RBC # BLD: 2.44 M/UL (ref 4–5.2)
SODIUM BLD-SCNC: 131 MMOL/L (ref 136–145)
TCO2 ARTERIAL: 22.9 MMOL/L
TCO2 ARTERIAL: 28.9 MMOL/L
WBC # BLD: 12.2 K/UL (ref 4–11)

## 2023-01-23 PROCEDURE — 6370000000 HC RX 637 (ALT 250 FOR IP): Performed by: INTERNAL MEDICINE

## 2023-01-23 PROCEDURE — 6370000000 HC RX 637 (ALT 250 FOR IP)

## 2023-01-23 PROCEDURE — 2500000003 HC RX 250 WO HCPCS: Performed by: INTERNAL MEDICINE

## 2023-01-23 PROCEDURE — 2000000000 HC ICU R&B

## 2023-01-23 PROCEDURE — 6360000002 HC RX W HCPCS: Performed by: INTERNAL MEDICINE

## 2023-01-23 PROCEDURE — 2700000000 HC OXYGEN THERAPY PER DAY

## 2023-01-23 PROCEDURE — 99233 SBSQ HOSP IP/OBS HIGH 50: CPT | Performed by: INTERNAL MEDICINE

## 2023-01-23 PROCEDURE — 5A1945Z RESPIRATORY VENTILATION, 24-96 CONSECUTIVE HOURS: ICD-10-PCS | Performed by: INTERNAL MEDICINE

## 2023-01-23 PROCEDURE — 94761 N-INVAS EAR/PLS OXIMETRY MLT: CPT

## 2023-01-23 PROCEDURE — 94003 VENT MGMT INPAT SUBQ DAY: CPT

## 2023-01-23 PROCEDURE — 92526 ORAL FUNCTION THERAPY: CPT

## 2023-01-23 PROCEDURE — 94640 AIRWAY INHALATION TREATMENT: CPT

## 2023-01-23 PROCEDURE — 84100 ASSAY OF PHOSPHORUS: CPT

## 2023-01-23 PROCEDURE — 2580000003 HC RX 258: Performed by: INTERNAL MEDICINE

## 2023-01-23 PROCEDURE — 85025 COMPLETE CBC W/AUTO DIFF WBC: CPT

## 2023-01-23 PROCEDURE — 92612 ENDOSCOPY SWALLOW (FEES) VID: CPT

## 2023-01-23 PROCEDURE — 99291 CRITICAL CARE FIRST HOUR: CPT | Performed by: INTERNAL MEDICINE

## 2023-01-23 PROCEDURE — 36592 COLLECT BLOOD FROM PICC: CPT

## 2023-01-23 PROCEDURE — 80048 BASIC METABOLIC PNL TOTAL CA: CPT

## 2023-01-23 PROCEDURE — 83735 ASSAY OF MAGNESIUM: CPT

## 2023-01-23 PROCEDURE — 82803 BLOOD GASES ANY COMBINATION: CPT

## 2023-01-23 RX ORDER — IPRATROPIUM BROMIDE AND ALBUTEROL SULFATE 2.5; .5 MG/3ML; MG/3ML
1 SOLUTION RESPIRATORY (INHALATION) EVERY 4 HOURS PRN
Status: DISCONTINUED | OUTPATIENT
Start: 2023-01-23 | End: 2023-01-24 | Stop reason: HOSPADM

## 2023-01-23 RX ADMIN — APIXABAN 2.5 MG: 5 TABLET, FILM COATED ORAL at 20:05

## 2023-01-23 RX ADMIN — APIXABAN 2.5 MG: 5 TABLET, FILM COATED ORAL at 09:25

## 2023-01-23 RX ADMIN — LORAZEPAM 0.5 MG: 0.5 TABLET ORAL at 20:04

## 2023-01-23 RX ADMIN — ATORVASTATIN CALCIUM 40 MG: 40 TABLET, FILM COATED ORAL at 20:04

## 2023-01-23 RX ADMIN — OXYCODONE HYDROCHLORIDE 5 MG: 5 TABLET ORAL at 16:05

## 2023-01-23 RX ADMIN — IPRATROPIUM BROMIDE AND ALBUTEROL SULFATE 3 ML: .5; 2.5 SOLUTION RESPIRATORY (INHALATION) at 15:28

## 2023-01-23 RX ADMIN — IPRATROPIUM BROMIDE AND ALBUTEROL SULFATE 3 ML: .5; 2.5 SOLUTION RESPIRATORY (INHALATION) at 23:16

## 2023-01-23 RX ADMIN — MIDAZOLAM 2 MG: 1 INJECTION INTRAMUSCULAR; INTRAVENOUS at 04:12

## 2023-01-23 RX ADMIN — SODIUM CHLORIDE, PRESERVATIVE FREE 10 ML: 5 INJECTION INTRAVENOUS at 09:26

## 2023-01-23 RX ADMIN — MIDODRINE HYDROCHLORIDE 10 MG: 10 TABLET ORAL at 09:25

## 2023-01-23 RX ADMIN — MIDODRINE HYDROCHLORIDE 10 MG: 10 TABLET ORAL at 18:19

## 2023-01-23 RX ADMIN — SERTRALINE HYDROCHLORIDE 50 MG: 50 TABLET ORAL at 09:25

## 2023-01-23 RX ADMIN — SODIUM CHLORIDE, PRESERVATIVE FREE 10 ML: 5 INJECTION INTRAVENOUS at 20:05

## 2023-01-23 RX ADMIN — IPRATROPIUM BROMIDE AND ALBUTEROL SULFATE 3 ML: .5; 2.5 SOLUTION RESPIRATORY (INHALATION) at 03:18

## 2023-01-23 RX ADMIN — MIDODRINE HYDROCHLORIDE 10 MG: 10 TABLET ORAL at 12:15

## 2023-01-23 RX ADMIN — Medication 15 ML: at 20:04

## 2023-01-23 RX ADMIN — HYDROCORTISONE 2.5%: 25 CREAM TOPICAL at 09:25

## 2023-01-23 RX ADMIN — IPRATROPIUM BROMIDE AND ALBUTEROL SULFATE 3 ML: .5; 2.5 SOLUTION RESPIRATORY (INHALATION) at 11:37

## 2023-01-23 RX ADMIN — IPRATROPIUM BROMIDE AND ALBUTEROL SULFATE 3 ML: .5; 2.5 SOLUTION RESPIRATORY (INHALATION) at 07:54

## 2023-01-23 RX ADMIN — Medication 15 ML: at 09:25

## 2023-01-23 RX ADMIN — GABAPENTIN 200 MG: 100 CAPSULE ORAL at 20:04

## 2023-01-23 RX ADMIN — IPRATROPIUM BROMIDE AND ALBUTEROL SULFATE 3 ML: .5; 2.5 SOLUTION RESPIRATORY (INHALATION) at 19:34

## 2023-01-23 RX ADMIN — CEFEPIME 1000 MG: 1 INJECTION, POWDER, FOR SOLUTION INTRAMUSCULAR; INTRAVENOUS at 18:23

## 2023-01-23 RX ADMIN — MIDAZOLAM 2 MG: 1 INJECTION INTRAMUSCULAR; INTRAVENOUS at 00:59

## 2023-01-23 RX ADMIN — NOREPINEPHRINE BITARTRATE 3 MCG/MIN: 1 INJECTION, SOLUTION, CONCENTRATE INTRAVENOUS at 04:14

## 2023-01-23 RX ADMIN — HYDROCORTISONE 2.5%: 25 CREAM TOPICAL at 20:05

## 2023-01-23 ASSESSMENT — PULMONARY FUNCTION TESTS
PIF_VALUE: 33
PIF_VALUE: 20
PIF_VALUE: 33
PIF_VALUE: 33
PIF_VALUE: 20
PIF_VALUE: 22
PIF_VALUE: 27
PIF_VALUE: 33
PIF_VALUE: 33

## 2023-01-23 NOTE — PROGRESS NOTES
[address]  SPEECH THERAPY  Fiberoptic Endoscopic Evaluation of Swallowing  (FEES)      RECOMMENDATIONS:  Soft and bite sized diet (IDDSI 6) with thin liquids, meds PO as tolerated (1 at a time). Pt is impulsive with PO at times -- encourage slow rate with PO intake, small bites/sips  Ensure PMV in place for PO (*cuff delated)  Remove PMV when sleeping/for naps      NAME: Sofia Brambila  YOB: 1950  GENDER: female  MRN: [id number]  ACCOUNT #: [de-identified]  REFERRING PHYSICIAN: Dr. Katty Ball  DIAGNOSIS: hospital-acquired pneumonia, ESRD on dialysis, leukocytosis, macrocytic anemia, tracheostomy dependence. Onset Date: pt admitted to [facility] on 1/19/23  PAIN: no c/o pain    EXAM DATE: 1/23/23  [place] [facility] [place]    CASE HISTORY:  Per MD H&P, \"The patient is a 71 y.o. female with pmhx of chronic hypoxic respiratory failure, hx of DVT,  CAD, atrial fibrillation anxiety, hx of CVA, chronic pain, ESRD on HD, SILVER, DM type 2, GERD,HLD who presented to [facility] ED with concern for abnormal labs. Patient went to dialysis yesterday, after dialysis her potassium was 7.0, she was sent to the ED. She does report they were unable to take any fluid off of her at dialysis. When asked about shortness of breath, she says \"I guess that's why im here\", mild cough. Denies fever, chills, chest pain, nausea, vomiting, diarrhea, hematuria, dysuria. Is complaining of rectal pain, has a history of hemorrhoids\". Per BSE completed on 1/20/23, \"Pt admitted d/t HAP, ESRD, leukocytosis, macrocytic anemia. Pt has PMHx of acute on chronic respiratory failure, trach dependent, GERD, CVA, and SILVER per chart. Pt seen by  at [facility] most recently on 2/21/22 with recommendations for a soft and bite sized diet with thin liquids, PO intake *only when cuff deflated.        Pt s/p tracheotomy in February 2021 per chart and PEG placement in January 2021\". FLEXIBLE ENDOSCOPIC EXAM: Endoscope placed in pt's R nare and passed without difficulty. No bleeding. TESTING POSITION: Pt seen upright in bed. Pt currently on trach mask (35% FiO2) and PMV in place. ORAL MOTOR FUNCTION:  WFL    BASE OF TONGUE RANGE OF MOTION: WFL    LATERAL PHARYNGEAL WALL RANGE OF MOTION: adequate    VELOPHARYNGEAL FUNCTION: adequate    TRUE VOCAL FOLDS: difficult to fully assess d/t hyperadduction of false vocal folds    FALSE VOCAL FOLDS: edema noted; hyperadduction with phonation    ARYTENOID CARTILAGE: edema noted    ARYEPIGLOTTIC FOLDS: edema noted    INTERARYTENOID SPACE: significant edema noted with posterior commissure hypertrophy    BASELINE SECRETIONS: no significant baseline secretions noted exception of white tinged areas along BOT     EPIGLOTTIC RIM RESTING POSITION: on BOT    VOCAL PITCH: adequate    VOCAL INTENSITY: adequate    VOCAL QUALITY: adequate    RESPIRATORY SUPPORT FOR PHONATION: somewhat reduced    SUSTAINED PHONATION: <5 seconds    COUGH ABILITY (VOLITIONAL / SPONTANEOUS): adequate    SALIVA SWALLOW ABILITY (VOLITIONAL / SPONTANEOUS): pt able to complete cued dry swallow as indicated during study. She does not complete many spontaneous saliva swallows    THROAT CLEAR ON CUE: adequate    THROAT DISCOMFORT: none reported    SPEECH INTELLIGIBILITY: WFL        PO TRIALS    THIN LIQUID (STRAW): Premature spillage to the valleculae with shallow penetration before the swallow that completely cleared. No aspiration. No significant pharyngeal residue post-swallow. PUREE FOOD TSP: premature spillage to the valleculae where swallow triggered, no penetration/aspiration. No significant pharyngeal residue noted post-swallow. NECTAR THICK LIQUID (STRAW): premature spillage to the valleculae where swallow triggered, no penetration/aspiration. No significant pharyngeal residue noted post-swallow.      REGULAR SOLIDS: grossly timely swallow initiation, no penetration/aspiration, no significant pharyngeal residue post-swallow.      BACKFLOW OF SECRETIONS: n/a    COMPENSATORY STRATEGIES / POSTURAL CHANGES TRIALED: small bites/sips, total feed assistance, upright positioning with PO, liquids via straw, alternate bites/sips, occasional dry swallow      PENETRATION-ASPIRATION SCALE (PAS)  [] 8 Material enters the airway, passes below the vocal folds, and no effort is made to eject  [] 7 Material enters the airway, passes below the vocal folds, and is not ejected from the trachea despite effort  [] 6 Material enters the airway, passes below the vocal folds, and is ejected into the larynx or out of the airway  [] 5 Material enters the airway, contacts the vocal folds, and is not ejected into the airway  [] 4 Material enters the airway, contacts the vocal folds, and is ejected from the airway  [] 3 Material enters the airway, remains above the vocal folds, and is not ejected from airway  [x] 2 Material enters the airway, remains above the vocal folds, and is ejected from airway  [] 1 Material does not enter the airway      IMPRESSION:  No significant baseline secretions noted upon endoscope entry.  Shallow penetration observed with thin liquids (straw) before the swallow that completely cleared.  No aspiration.  No penetration/aspiration noted with remainder of PO trials during study.  No significant pharyngeal residue noted post-swallow with PO trials during study.      Dysphagia Treatment Goals  Timeframe for Long-term Goals: 7 days, 1/27/23  Goal 1: The pt will tolerate safest and least restrictive diet without clinical s/s of aspiration or change in respiratory status.   Short-term Goals  Timeframe for Short-term Goals: 5 days, 1/25/23  1) The patient will tolerate recommended diet without observed clinical signs of aspiration.   2) The patient/caregiver will demonstrate understanding of compensatory strategies for improved swallowing safety.   01/23: FEES completed  this date, goal met    ENDOSCOPE REMOVAL: Endoscope was removed without incident, no adverse reactions. No bleeding. PRE TEST HR: 82  PRE TEST O2: 99%  POST TEST HR: 81  POST TEST O2: 100%      RECOMMENDED COMPENSATORY STRATEGIES / POSTURAL CHANGES: HOB 90* and 30\" after meals; small bites/sips; alternate solids/liquids every 3-5 bites; oral care after every meal      EDUCATION:  Reviewed results and recommendations of this evaluation, signs, symptoms, and risk of aspiration, as well as diet recommendations and strategies. Reviewed and discussed goals and plan of care with pt and RN.       TREATMENT TIME:  [id number] (45 minutes); FEES procedure and dysphagia tx    ELECTRONIC SIGNATURE:  Rashaad Bethea M.S. Venkata Araiza  Speech-language pathologist  [id number]

## 2023-01-23 NOTE — CARE COORDINATION
INTERDISCIPLINARY PLAN OF CARE CONFERENCE    Date/Time: 1/23/2023 9:42 AM  Completed by: Shanti Abreu RN, Case Management      Patient Name:  Sarah Parnell  YOB: 1950  Admitting Diagnosis: Macrocytic anemia [D53.9]  Hospital-acquired pneumonia [J18.9, Y95]  ESRD on dialysis ([facility] [address] [N18.6, Z99.2]  Tracheostomy dependence ([facility] [Z93.0]  HCAP (healthcare-associated pneumonia) [J18.9]  Leukocytosis, unspecified type [D72.829]     Admit Date/Time:  1/19/2023  2:16 PM    Chart reviewed. Interdisciplinary team contacted or reviewed plan related to patient progress and discharge plans. Disciplines included Case Management, Nursing, and Dietitian. Current Status:inpt  PT/OT recommendation for discharge plan of care: tbd    Expected D/C Disposition:  Nursing Home  Confirmed plan with patient and/or family Yes     Discharge Plan Comments: Reviewed chart. Pt from [facility],chronic Vent/Trach,+HD, MWF schedule and plan return. No pre-cert required unless skilled services needed. Per MD pt will likely return back to [facility] tomorrow. Following.     Home O2 in place on admit: Yes,per facility

## 2023-01-23 NOTE — PROGRESS NOTES
Physician Progress Note      PATIENT:               Rebel Harding  CSN #:                  [id number]  :                       1950  ADMIT DATE:       2023 2:16 PM  [address] [place] DATE:  RESPONDING  PROVIDER #:        Jovan Medel MD          QUERY TEXT:    Pt admitted with pneumonia. Noted documentation of severe sepsis on  and   septic shock on  per pulmonary consult note. Patient with wbc-13.6 and   pct-1.72 on admission. If possible, please document in progress notes and   discharge summary:    The medical record reflects the following:  Risk Factors: advanced age, pneumonia, chronic trach, chronic resp failure,   SILVER, Prior history of Proteus, Acinetobacter, Pseudomonas pneumonia  Clinical Indicators: wbc-13.6 on admit, pct-1.72, no bands, tachycardia,   hypotension-shock per pulmonary note on   Treatment: pct, lactic, blood cultures, Levophed, vancomycin, Cefipime, ICU,   supportive care    Thank you,  Barbra Ludwig RN,BSN,CCDS,CRCR  Options provided:  -- Severe Sepsis evolving on admission due to pneumonia  -- Severe Sepsis due to pneumonia confirmed not present on admission  -- Sepsis ruled out  -- Other - I will add my own diagnosis  -- Disagree - Not applicable / Not valid  -- Disagree - Clinically unable to determine / Unknown  -- Refer to Clinical Documentation Reviewer    PROVIDER RESPONSE TEXT:    The diagnosis of Severe Sepsis was evolving on admission due to pneumonia. Query created by: Marielena Krishnamurthy on 2023 11:47 AM      QUERY TEXT:    Pt admitted with HCAP. Chronic trach with  Prior history of Proteus,   Acinetobacter, Pseudomonas pneumonia. Noted documentation of Healthcare associated pneumonia/aspiration pneumonia. Probable gram negative pneumonia.   If possible, please document in progress   notes and discharge summary:    The medical record reflects the following:  Risk Factors: advanced age, chronic trach, chronic resp failure, pt with hx of MDRO, MRSA, pseudomonas, esrd, chf, dm2  Clinical Indicators: Healthcare associated pneumonia/aspiration pneumonia,    Healthcare associated pneumonia - probable gram negative, risk for methicillin   resistant Staph aureus as well  Treatment: s/p bronch-removal of foreign body (pill), vent, vancomycin,   Cefipime, speech eval    Thank you,  Barbra Ludwig RN,BSN,CCDS,CRCR  Options provided:  -- Patient with gram negative and aspiration pneumonia  -- Patient with gram negative pneumonia  -- Patient with aspiration pneumonia  -- Other - I will add my own diagnosis  -- Disagree - Not applicable / Not valid  -- Disagree - Clinically unable to determine / Unknown  -- Refer to Clinical Documentation Reviewer    PROVIDER RESPONSE TEXT:    The patient with gram negative and aspiration pneumonia.     Query created by: Eufemia Dixon on 1/23/2023 11:53 AM      Electronically signed by:  Zeenat West MD 1/23/2023 3:07 PM

## 2023-01-23 NOTE — PROGRESS NOTES
01/22/23 2330   Patient Observation   Heart Rate 93   Resp 15   SpO2 100 %   Breath Sounds   Right Upper Lobe Diminished   Right Middle Lobe Diminished   Right Lower Lobe Diminished   Left Upper Lobe Diminished   Left Lower Lobe Diminished   Vent Information   Vent Mode AC/VC   Ventilator Settings   FiO2  (S)  40 %   Vt (Set, mL) 300 mL   Resp Rate (Set) 18 bmp   PEEP/CPAP (cmH2O) 8   Vent Patient Data (Readings)   Vt (Measured) 426 mL   Peak Inspiratory Pressure (cmH2O) 18 cmH2O   Rate Measured 20 br/min   Minute Volume (L/min) 5.68 Liters   Mean Airway Pressure (cmH2O) 12 cmH20   I:E Ratio 1:2.5   Additional Respiratoray Assessments   Humidification Source Heated wire   Humidification Temp 35.9   Circuit Condensation Drained   Ambu Bag With Mask At Bedside Yes   Airway Clearance   Suction Trach   Suction Device Inline suction catheter   Sputum Method Obtained Tracheal   Sputum Amount Moderate   Sputum Color/Odor Yellow   Sputum Consistency Thick   Treatment   $Treatment Type $Inhaled Therapy/Meds

## 2023-01-23 NOTE — PROGRESS NOTES
01/22/23 1957   Patient Observation   Heart Rate 83   Resp 21   SpO2 100 %   Observations shiley 6 xlt distal   Breath Sounds   Right Upper Lobe Diminished   Right Middle Lobe Diminished   Right Lower Lobe Diminished   Left Upper Lobe Diminished   Left Lower Lobe Diminished   Vent Information   Vent Mode AC/VC   Ventilator Settings   FiO2  45 %   Vt (Set, mL) 300 mL   Resp Rate (Set) 18 bmp   PEEP/CPAP (cmH2O) 8   Vent Patient Data (Readings)   Vt (Measured) 305 mL   Peak Inspiratory Pressure (cmH2O) 13 cmH2O   Rate Measured 20 br/min   Minute Volume (L/min) 6.1 Liters   Mean Airway Pressure (cmH2O) 9.3 cmH20   Plateau Pressure (cm H2O) 15 cm H2O   I:E Ratio 1:2.2   Static Compliance (L/cm H2O) 44   Dynamic Compliance (L/cm H2O) 44 L/cm H2O   Vent Alarm Settings   High Pressure (cmH2O) 50 cmH2O   Low Minute Volume (lpm) 2 L/min   Low Exhaled Vt (ml) 190 mL   RR High (bpm) 45 br/min   Apnea (secs) 20 secs   Additional Respiratoray Assessments   Humidification Source Heated wire   Humidification Temp 36.9   Circuit Condensation Drained   Ambu Bag With Mask At Bedside Yes   Backup Trachs Available (Size) 4.0;6.0   Airway Clearance   Suction Trach   Suction Device Inline suction catheter   Sputum Amount Moderate   Sputum Color/Odor Yellow   Sputum Consistency Thick

## 2023-01-23 NOTE — PROGRESS NOTES
Vancomycin Day: 5/7  Current Regimen:Dose with HD    Patient's labs, cultures, vitals, and vancomycin regimen reviewed.    SCr HD  U/O  HD      Plan:   No vanc today, level in am. Dose with HD  Batsheva AVILA 1/23/202310:55 AM  .

## 2023-01-23 NOTE — PROGRESS NOTES
01/22/23 2000   RT Protocol   History Pulmonary Disease 1   Respiratory pattern 2   Breath sounds 2   Cough 1   Indications for Bronchodilator Therapy On home bronchodilators   Bronchodilator Assessment Score 6   RT Inhaler-Nebulizer Bronchodilator Protocol Note    There is a bronchodilator order in the chart from a provider indicating to follow the RT Bronchodilator Protocol and there is an Initiate RT Inhaler-Nebulizer Bronchodilator Protocol order as well (see protocol at bottom of note). CXR Findings:  XR CHEST PORTABLE    Result Date: 1/22/2023  No significant interval change in multifocal bilateral airspace disease, greatest at the right base. XR CHEST PORTABLE    Result Date: 1/21/2023  Worsened consolidation in the right lower lobe. Findings are concerning for pneumonia. Background of moderate worsened pulmonary edema. The findings from the last RT Protocol Assessment were as follows:   History Pulmonary Disease: Smoker 15 pack years or more  Respiratory Pattern: Dyspnea on exertion or RR 21-25 bpm  Breath Sounds: Slightly diminished and/or crackles  Cough: Strong, productive  Indication for Bronchodilator Therapy: On home bronchodilators  Bronchodilator Assessment Score: 6    Aerosolized bronchodilator medication orders have been revised according to the RT Inhaler-Nebulizer Bronchodilator Protocol below. Respiratory Therapist to perform RT Therapy Protocol Assessment initially then follow the protocol. Repeat RT Therapy Protocol Assessment PRN for score 0-3 or on second treatment, BID, and PRN for scores above 3. No Indications - adjust the frequency to every 6 hours PRN wheezing or bronchospasm, if no treatments needed after 48 hours then discontinue using Per Protocol order mode. If indication present, adjust the RT bronchodilator orders based on the Bronchodilator Assessment Score as indicated below.   Use Inhaler orders unless patient has one or more of the following: on home nebulizer, not able to hold breath for 10 seconds, is not alert and oriented, cannot activate and use MDI correctly, or respiratory rate 25 breaths per minute or more, then use the equivalent nebulizer order(s) with same Frequency and PRN reasons based on the score. If a patient is on this medication at home then do not decrease Frequency below that used at home. 0-3 - enter or revise RT bronchodilator order(s) to equivalent RT Bronchodilator order with Frequency of every 4 hours PRN for wheezing or increased work of breathing using Per Protocol order mode. 4-6 - enter or revise RT Bronchodilator order(s) to two equivalent RT bronchodilator orders with one order with BID Frequency and one order with Frequency of every 4 hours PRN wheezing or increased work of breathing using Per Protocol order mode. 7-10 - enter or revise RT Bronchodilator order(s) to two equivalent RT bronchodilator orders with one order with TID Frequency and one order with Frequency of every 4 hours PRN wheezing or increased work of breathing using Per Protocol order mode. 11-13 - enter or revise RT Bronchodilator order(s) to one equivalent RT bronchodilator order with QID Frequency and an Albuterol order with Frequency of every 4 hours PRN wheezing or increased work of breathing using Per Protocol order mode. Greater than 13 - enter or revise RT Bronchodilator order(s) to one equivalent RT bronchodilator order with every 4 hours Frequency and an Albuterol order with Frequency of every 2 hours PRN wheezing or increased work of breathing using Per Protocol order mode.        Electronically signed by Esther Wolfe RCP on 1/22/2023 at 8:02 PM

## 2023-01-23 NOTE — PROGRESS NOTES
Speech Language Pathology    Speech Language Pathology  Dysphagia Treatment/Follow-Up Note  Facility/Department: [facility] ICU    Recommendations:  Solid Consistency: NPO  Liquid Consistency: NPO  Medication: Meds via alt means of nutrition  Instrumental swallow study via FEES recommended to correlate clinical findings & prior to initiation of PO diet      NAME:Jennifer Gifford  : 1950 (71 y.o.)   MRN: [id number]  ROOM: [address] [place] [address]  ADMISSION DATE: 2023  PATIENT DIAGNOSIS(ES): Macrocytic anemia [D53.9]  Hospital-acquired pneumonia [J18.9, Y95]  ESRD on dialysis ([address] [N18.6, Z99.2]  Tracheostomy dependence ([address] [Z93.0]  HCAP (healthcare-associated pneumonia) [J18.9]  Leukocytosis, unspecified type [D72.829]  Allergies   Allergen Reactions    Haloperidol Lactate Other (See Comments)     PT DOESN'T REMEMBER      Ziprasidone Hcl Other (See Comments)     PT DOESN'T REMEMBER         DATE ONSET: 2023    Pain: The patient does not complain of pain     Current Diet: Diet NPO    Diet Tolerance: Pt currently NPO    CXR Findings (23): Impression   No significant interval change in multifocal bilateral airspace disease,   greatest at the right base. Dysphagia Treatment and Impressions:  Subjective: Pt seen in room at bedside with RN permission Rico Brian). Pt seen upright in bed. Pt currently requiring vent support. RT present and to place on trach collar. RN Report/Chart Review: Pt with change in medical status, transferred to ICU on . Pt s/p bronchoscopy on  with grape removed per chart. Pt currently requiring vent support. Evaluation order received , however pt currently on caseload. Re-evaluation/dysphagia follow-up completed this date. Patient tolerance: Pt downgraded to NPO by MD on . Respiratory Status: Pt with SPO2% of 100% on trach collar 35% FiO2 with RR of 20/min. PMV placed once pt placed on trach collar (*cuff deflated).   Pt tolerated PMV placement without distress. Liquid PO Trials: PO trials held at this time pending completion of instrumental swallow study via FEES    Solid PO Trials: PO trials held at this time pending completion of instrumental swallow study via FEES    Education: SLP edu pt re: Role of SLP, Rationale for dysphagia tx, Aspiration precautions, Rationale for FEES, and Rationale for NPO status. Pt verbalized understanding, would benefit from ongoing education, and RN aware of recommendations  Assessment: Continue NPO at this time. Pt tolerated PMV placement on trach collar without difficulty. PO trials held pending completion of instrumental swallow study via FEES. SLP reviewed indications and contraindications for FEES with pt -- she verbalized understanding and in agreement for study. SLP to discuss recommendation with RN, request order for FEES. Results and recs to follow. Recommendations: SLP recommends to continue with NPO; NPO; Meds via alt means of nutrition; instrumental swallow study via FEES recommended prior to initiation of PO diet, to correlate clinical findings  Risk Management: oral care q4 hrs to reduce adverse affects in the event of aspiration and general aspiration precautions. Dysphagia Goals:  Timeframe for Long-term Goals: 7 days, 1/27/23  Goal 1: The pt will tolerate safest and least restrictive diet without clinical s/s of aspiration or change in respiratory status. 01/23: ongoing, recommend that the pt continue to be NPO at this time pending instrumental swallow study via FEES     Short-term Goals  Timeframe for Short-term Goals: 5 days, 1/25/23  1) The patient will tolerate recommended diet without observed clinical signs of aspiration. 01/23: ongoing, see above  2) The patient/caregiver will demonstrate understanding of compensatory strategies for improved swallowing safety.  01/23: ongoing, pt would benefit from continued reinforcement  3) The patient will tolerate instrumental swallowing procedure. 01/23: FEES recommended to correlate clinical findings, prior to initiation of PO diet    Speech/Language/Cog Goals: N/A    Recommendations:  Solid Consistency: NPO  Liquid Consistency: NPO  Medication: Meds via alt means of nutrition  Instrumental swallow study via FEES recommended to correlate clinical findings    Plan:    Continued Dysphagia treatment with goals per plan of care. Discharge Recommendations: Recommend SLP tx in SNF setting    If pt discharges from hospital prior to Speech/Swallowing discharge, this note serves as tx and discharge summary.      Total Treatment Time / Charges     Time in Time out Total Time / units   Cognitive Tx         Speech Tx      Dysphagia Tx 1135 1152 17 min / 1 unit     Signature:  Anthony Campbell M.S. [id number] [facility]  Speech-language pathologist  [id number]

## 2023-01-23 NOTE — PROGRESS NOTES
Pt placed on 35% [facility]         01/23/23 1145   Treatment   Treatment Type Aerosol generator   $Treatment Type $Inhaled Therapy/Meds   Oxygen Therapy/Pulse Ox   O2 Device Trach collar   FiO2  35 %   Heart Rate 84   Resp 19   SpO2 100 %

## 2023-01-23 NOTE — PROGRESS NOTES
Shift assessment, completed, see flow sheet. Pt is alert and oriented x 1. Following commands. VSS. Trach Vent, On  / 18 /45 %/ 8. SpO2 100%. Respirations are easy, even, and unlabored. Call light within reach. Bed in lowest position. Bed alarm on.

## 2023-01-23 NOTE — PROGRESS NOTES
IM Progress Note    Admit Date:  1/19/2023  4    Interval history:  chronic trach, capped  on 2 L , presented from [facility] for sob and hyperkalemia  Worsened resp distress needing ICU transfer, placed on vent, had bronch with therapeutic aspiration , central line placement    Subjective:  Ms. Kaveh Saldaña on vent support, awake, alert and oriented, hypotensive on levophed  Feels some better  Nasal swab with MRSA +    Follow few simple commands. Objective:   /72   Pulse 78   Temp 97.9 °F (36.6 °C) (Temporal)   Resp 13   Ht 5' 7\" (1.702 m)   Wt 296 lb 9.6 oz (134.5 kg)   SpO2 100%   BMI 46.45 kg/m²     Intake/Output Summary (Last 24 hours) at 1/23/2023 1508  Last data filed at 1/23/2023 1353  Gross per 24 hour   Intake 228.73 ml   Output --   Net 228.73 ml         Physical Exam:     General:  elderly AA female, obese  up in bed, on vent support  Awake, alert and well oriented today   Mucous Membranes:  Pink , anicteric  Tracheostomy status with vent connected   Neck: No JVD, no carotid bruit, no thyromegaly  Chest: improved jackie air entry  diminished in bases  Cardiovascular:  RRR S1S2 heard, no murmurs or gallops  Abdomen:  Soft, obese, undistended, non tender, no organomegaly, BS present  Extremities: right UE AV fistula, chronic atrophic ext   No edema or cyanosis.  Distal pulses well felt  Right femoral TLC  Neurological - bed bound, non ambulatory, awake, alert and oriented       Medications:   Scheduled Medications:    midodrine  10 mg Oral TID WC    [START ON 1/24/2023] epoetin claire-epbx  10,000 Units IntraVENous Once per day on Tue Thu Sat    gabapentin  200 mg Oral Nightly    sodium chloride  500 mL IntraVENous Once    sertraline  50 mg Oral Daily    sennosides-docusate sodium  2 tablet Oral Nightly    pantoprazole  40 mg Oral QAM AC    [Held by provider] metoprolol tartrate  25 mg Oral BID    ipratropium-albuterol  1 vial Inhalation Q4H    chlorhexidine  15 mL Mouth/Throat BID    doxercalciferol  3 mcg IntraVENous Once per day on Tue Thu Sat    cefepime  1,000 mg IntraVENous Q24H    apixaban  2.5 mg Oral BID    atorvastatin  40 mg Oral Nightly    hydrocortisone   Rectal BID    sodium chloride flush  5-40 mL IntraVENous 2 times per day    vancomycin (VANCOCIN) intermittent dosing (placeholder)   Other RX Placeholder     I   norepinephrine Stopped (01/23/23 1215)    sodium chloride       ipratropium-albuterol, oxyCODONE, midazolam, fentanNYL, carboxymethylcellulose PF, LORazepam, sodium chloride flush, sodium chloride, ondansetron **OR** ondansetron, polyethylene glycol, acetaminophen **OR** acetaminophen    Lab Data:  Recent Labs     01/21/23  0500 01/22/23  0347 01/23/23  0422   WBC 7.6 11.2* 12.2*   HGB 7.9* 8.1* 7.7*   HCT 25.4* 25.5* 24.4*   .8* 100.5* 100.2*    214 219       Recent Labs     01/21/23  0500 01/22/23  0347 01/23/23  0458   * 131* 131*   K 4.9 4.3  4.4 4.0   CL 97* 93* 92*   CO2 25 26 23   PHOS  --  5.3* 5.6*   BUN 66* 44* 53*   CREATININE 5.9* 4.5* 5.8*       No results for input(s): CKTOTAL, CKMB, CKMBINDEX, TROPONINI in the last 72 hours.       Coagulation:   Lab Results   Component Value Date/Time    INR 1.33 05/01/2022 08:40 AM    APTT 34.2 04/27/2022 05:24 AM     Cardiac markers:   Lab Results   Component Value Date/Time    TROPONINI 0.20 01/19/2023 04:56 PM         Lab Results   Component Value Date    ALT 11 01/19/2023    AST 12 (L) 01/19/2023    ALKPHOS 89 01/19/2023    BILITOT 0.7 01/19/2023       Lab Results   Component Value Date    INR 1.33 (H) 05/01/2022    INR 1.36 (H) 05/01/2022    INR 1.22 (H) 04/30/2022    PROTIME 15.2 (H) 05/01/2022    PROTIME 15.6 (H) 05/01/2022    PROTIME 13.9 (H) 04/30/2022       Radiology    CULTURES  Blood cultures pending  COVID and Flu not detected   BAL pending       EKG: JAN-2023 14:16:28 [facility] ROUTINE RECORD  Sinus rhythm with 1st degree A-V block  Low voltage QRS  Cannot rule out Anterior infarct (cited on or before 19-JAN-2023)  Abnormal ECG  When compared with ECG of 27-OCT-2022 07:18,  No significant change was found  Confirmed by Mukesh Hearn MD, [facility] ([facility]) on 1/19/2023 4:54:06 PM     RADIOLOGY    XR CHEST PORTABLE   Final Result   No significant interval change in multifocal bilateral airspace disease,   greatest at the right base. XR CHEST PORTABLE   Final Result   Worsened consolidation in the right lower lobe. Findings are concerning for   pneumonia. Background of moderate worsened pulmonary edema. XR CHEST PORTABLE   Final Result   1. Right basilar airspace disease. This could reflect atelectasis versus   pneumonia in the appropriate clinical setting. 2.  Mild cardiomegaly              Bronchoscopy     A complete airway inspection was performed. Large foreign body occluding right mainstem. Airway retrieval basket was used and the RLL foreign body was extracted in total.  Bilateral thick, purulent secretions bilateral airways with mucous plugs. Saline injection followed by a therapeutic aspiration was performed. Washings were obtained throughout the airways. A Bronchoalveolar lavage was obtained from the RLL with good return     Pertinent previous results reviewed   Limited echocardiogram 10/27/2022  Summary  Limited only f/u for LVEF. LV systolic function is mildly reduced with a visually estimated EF of 45-50%. No obvious segmental wall motion abnormality.      ASSESSMENT/PLAN:     # HCAP  #Acute on Chronic hypoxic respiratory failure with tracheostomy     -s/p tracheostomy over 1 year ago, on trach collar with 2 L O2 at baseline  -pt with hx of MDRO, MRSA, pseudomonas   -procalc, WBC elevated, no fevers, BP stable      -CXR as above with atelectasis vs pna , MRSA nasal swab +ve   -COVID and Flu not detected   -pulmonology  following      -Blood cultures pending   -Worsened resp distress needing ICU transfer, placed on vent, had bronch with therapeutic aspiration for foreign body ( pill ) and mucous plugs - improving slowly   - continue on IV vancomycin and cefepime   - await BAL    #ESRD on HD   #Hyperkalemia   -reportedly potassium of 7.0 after dialysis   -5.1 on recheck   -dialysis T, Thurs, Sat schedule   -continue home meds   -nephrology consulted and resumed HD     #Elevated troponin   #CAD  -0.20, chronically elevated   -ASA, statin, BB   -at chem stress test during Sept admission 2022, cards recommended medical management   -no acute ischemic EKG changes  -no CP      #Hypotension   -on midodrine 3x weekly   -holding lopressor - now on pressor support. Wean off   - consider IVF boluses     #Chronic macrocyctic anemia   -Hgb slightly lower than baseline   -continue to monitor   -no s/s of acute bleeding      #Chronic diastolic HF   -last echo as above   -does not appear acutely decompensated      #Hyponatremia   --improved     #DM type 2  -no home regimen   -BG stable today   -continue to monitor      #Atrial fibrillation   Hx of VTE   -NSR on presentation   -on eliquis for [facility]         #Hx of CVA   -on eliquis  -ASA, statin         #Anxiety   #Depression   -ativan prn - hold   -on zoloft      #GERD   -PPI      #Chronic pain   -on gabapentin, roxicodone     DVT Prophylaxis: eliquis   Diet: ADULT DIET;  Regular; Low Potassium (Less than 3000 mg/day)  Code Status: Full Code         Alan Carlson MD, 1/23/2023 3:08 PM

## 2023-01-23 NOTE — PROGRESS NOTES
RT Nebulizer Bronchodilator Protocol Note    There is a bronchodilator order in the chart from a provider indicating to follow the RT Bronchodilator Protocol and there is an Initiate RT Bronchodilator Protocol order as well (see protocol at bottom of note). CXR Findings:  XR CHEST PORTABLE    Result Date: 1/22/2023  No significant interval change in multifocal bilateral airspace disease, greatest at the right base. XR CHEST PORTABLE    Result Date: 1/21/2023  Worsened consolidation in the right lower lobe. Findings are concerning for pneumonia. Background of moderate worsened pulmonary edema. The findings from the last RT Protocol Assessment were as follows:  Smoking: (P) Smoker 15 pack years or more  Respiratory Pattern: (P) Regular pattern and RR 12-20 bpm  Breath Sounds: (P) Slightly diminished and/or crackles  Cough: (P) Strong, productive  Indication for Bronchodilator Therapy: (P) On home bronchodilators  Bronchodilator Assessment Score: (P) 4    Aerosolized bronchodilator medication orders have been revised according to the RT Nebulizer Bronchodilator Protocol below. Respiratory Therapist to perform RT Therapy Protocol Assessment initially then follow the protocol. Repeat RT Therapy Protocol Assessment PRN for score 0-3 or on second treatment, BID, and PRN for scores above 3. No Indications - adjust the frequency to every 6 hours PRN wheezing or bronchospasm, if no treatments needed after 48 hours then discontinue using Per Protocol order mode. If indication present, adjust the RT bronchodilator orders based on the Bronchodilator Assessment Score as indicated below. If a patient is on this medication at home then do not decrease Frequency below that used at home. 0-3 - enter or revise RT bronchodilator order(s) to equivalent RT Bronchodilator order with Frequency of every 4 hours PRN for wheezing or increased work of breathing using Per Protocol order mode.        4-6 - enter or revise RT Bronchodilator order(s) to two equivalent RT bronchodilator orders with one order with BID Frequency and one order with Frequency of every 4 hours PRN wheezing or increased work of breathing using Per Protocol order mode. 7-10 - enter or revise RT Bronchodilator order(s) to two equivalent RT bronchodilator orders with one order with TID Frequency and one order with Frequency of every 4 hours PRN wheezing or increased work of breathing using Per Protocol order mode. 11-13 - enter or revise RT Bronchodilator order(s) to one equivalent RT bronchodilator order with QID Frequency and an Albuterol order with Frequency of every 4 hours PRN wheezing or increased work of breathing using Per Protocol order mode. Greater than 13 - enter or revise RT Bronchodilator order(s) to one equivalent RT bronchodilator order with every 4 hours Frequency and an Albuterol order with Frequency of every 2 hours PRN wheezing or increased work of breathing using Per Protocol order mode. RT to enter RT Home Evaluation for COPD & MDI Assessment order using Per Protocol order mode.     Electronically signed by Little Johns RCP on 1/23/2023 at 2:05 PM

## 2023-01-23 NOTE — PROGRESS NOTES
Pulmonary & Critical Care Medicine ICU Progress Note    CC: Respiratory failure    Events of Last 24 hours:   Ongoing need for mechanical ventilation  Continues to require levophed    Vascular lines: IV: Right femoral line     MV: -current titrate off ventilator at baseline  Vent Mode: AC/VC Resp Rate (Set): 18 bmp/Vt (Set, mL): 300 mL/ /FiO2 : 40 %  Recent Labs     23  0433 23  0535   PHART 7.294* 7.405   [id number] 57.2* 35.6   PO2ART 30.7* 60.5*       IV:   dexmedetomidine HCl in NaCl      norepinephrine 2 mcg/min (23 0612)    sodium chloride         Vitals:  Blood pressure (!) 108/57, pulse 84, temperature 99.5 °F (37.5 °C), temperature source Oral, resp. rate 18, height 5' 7\" (1.702 m), weight 296 lb 9.6 oz (134.5 kg), SpO2 99 %. on AC    Temp  Av.1 °F (37.3 °C)  Min: 97.9 °F (36.6 °C)  Max: 99.7 °F (37.6 °C)    Intake/Output Summary (Last 24 hours) at 2023 8307  Last data filed at 2023 6717  Gross per 24 hour   Intake 108.73 ml   Output --   Net 108.73 ml     PE:  General: intubated, ill appearing    ENT: tracheostomy tube present  Resp: No crackles. No wheezing. CV: S1, S2. +edema  GI: NT, ND, +BS  Skin: Warm and dry. Neuro: PERRL. OETV, + following commands.  Patellar reflexes are symmetric     Scheduled Meds:   midodrine  10 mg Oral TID WC    [START ON 2023] epoetin claire-epbx  10,000 Units IntraVENous Once per day on  Sat    gabapentin  200 mg Oral Nightly    sodium chloride  500 mL IntraVENous Once    sertraline  50 mg Oral Daily    sennosides-docusate sodium  2 tablet Oral Nightly    pantoprazole  40 mg Oral QAM AC    midodrine  10 mg Oral Once per day on     [Held by provider] metoprolol tartrate  25 mg Oral BID    ipratropium-albuterol  1 vial Inhalation Q4H    chlorhexidine  15 mL Mouth/Throat BID    doxercalciferol  3 mcg IntraVENous Once per day on  Sat    cefepime  1,000 mg IntraVENous Q24H    apixaban  2.5 mg Oral BID atorvastatin  40 mg Oral Nightly    hydrocortisone   Rectal BID    sodium chloride flush  5-40 mL IntraVENous 2 times per day    vancomycin (VANCOCIN) intermittent dosing (placeholder)   Other RX Placeholder       Data:  CBC:   Recent Labs     01/21/23  0500 01/22/23  0347 01/23/23  0422   WBC 7.6 11.2* 12.2*   HGB 7.9* 8.1* 7.7*   HCT 25.4* 25.5* 24.4*   .8* 100.5* 100.2*    214 219     BMP:   Recent Labs     01/21/23  0500 01/22/23  0347 01/23/23  0458   * 131* 131*   K 4.9 4.3  4.4 4.0   CL 97* 93* 92*   CO2 25 26 23   PHOS  --  5.3* 5.6*   BUN 66* 44* 53*   CREATININE 5.9* 4.5* 5.8*     LIVER PROFILE:   No results for input(s): AST, ALT, LIPASE, BILIDIR, BILITOT, ALKPHOS in the last 72 hours. Invalid input(s): AMYLASE,  ALB      Microbiology:  1/19 BC NGTD  1/19 COVID-19 not detected  1/20 tracheal aspirate NRF   1/21 BAL/washing pending   1/20/23 MRSA probe positive     Imaging:  Chest x-ray 1/22/23  RLL ASD   Mild cardiomegaly     ASSESSMENT:  Acute hypoxic and hypercapnic respiratory failure   Acute metabolic encephalopathy   Septic shock   Healthcare associated pneumonia/aspiration pneumonia  RLL foreign body removal 1/21/2023  Chronic tracheostomy -history of chronic respiratory failure requiring continuous mechanical ventilation through tracheostomy, had been weaned to trach collar   ESRD on HD  Chronic Systolic CHF EF 42%  History of SILVER and pulmonary hypertension  History of A. fib, VTE and CVA on Eliquis  Difficult IV access-not able to thread wire during right IJ attempt  [facility] resident      PLAN:  Mechanical ventilation as per my orders.  The ventilator was adjusted by me at the bedside for unstable, life threatening respiratory failure  Fentanyl and Versed PRN, gtt as needed  Head of bed 30 degrees or higher at all times  Vancomycin and cefepime D#4, monitor vancomycin level for toxicity  IV Levophed to keep MAP > 65 mmHg    HD per nephrology  Remove right femoral line when no longer needed  Blood sugar control ISS, with goal 150-180  GI prophylaxis: Pepcid  DVT prophylaxis: Eliquis  MRSA prophylaxis: Bactroban    Total critical care time caring for this patient with life threatening, unstable organ failure, including direct patient contact, management of life support systems, review of data including imaging and labs, discussions with other team members and physicians is 40 minutes, excluding procedures.

## 2023-01-23 NOTE — PROGRESS NOTES
01/23/23 0318   Patient Observation   Heart Rate 87   Resp 14   Observations shiley 6 xlt distal   Breath Sounds   Right Upper Lobe Diminished   Right Middle Lobe Diminished   Right Lower Lobe Diminished   Left Upper Lobe Diminished   Left Lower Lobe Diminished   Vent Information   Vent Mode AC/VC   Ventilator Settings   FiO2  40 %   Vt (Set, mL) 300 mL   Resp Rate (Set) 18 bmp   PEEP/CPAP (cmH2O) 8   Vent Patient Data (Readings)   Vt (Measured) 314 mL   Peak Inspiratory Pressure (cmH2O) 27 cmH2O   Rate Measured 18 br/min   Minute Volume (L/min) 5.7 Liters   Mean Airway Pressure (cmH2O) 12 cmH20   I:E Ratio 1:2.5   Vent Alarm Settings   High Pressure (cmH2O) 50 cmH2O   Low Minute Volume (lpm) 2 L/min   Low Exhaled Vt (ml) 190 mL   RR High (bpm) 45 br/min   Apnea (secs) 20 secs   Additional Respiratoray Assessments   Humidification Source Heated wire   Humidification Temp 36.8   Circuit Condensation Drained   Ambu Bag With Mask At Bedside Yes   Backup Trachs Available (Size) 4.0;6.0   Airway Clearance   Suction Trach   Suction Device Inline suction catheter   Sputum Method Obtained Tracheal   Sputum Amount Moderate   Sputum Color/Odor Yellow

## 2023-01-23 NOTE — PROGRESS NOTES
Speech Language Pathology  FEES    FEES completed -- no brigitte aspiration noted with PO trials. Recommend initiating Soft and bite sized diet (IDDSI 6) with thin liquids, straws OK, meds whole with TL or puree 1 at a time, strict aspiration precautions. Full report to follow. RN notified of recs.     Marcelino Perez M.S. [facility]  Speech-language pathologist  [phone]

## 2023-01-23 NOTE — FLOWSHEET NOTE
01/23/23 1000   Vitals   Heart Rate 88   Heart Rate Source Monitor   Resp 15   /70   MAP (Calculated) 90   MAP (mmHg) 84   BP Method Automatic   Oxygen Therapy   SpO2 100 %   O2 Device Ventilator  (trach)   FiO2  35 %   Vital signs stable. BP remains soft. Shift assessment, completed, see flow sheet. Able to follow commands. Chronic trache vent with # 6 Shiley XLT distal ETT, 18 / 300 / 35 %/ 5. NSR, Respirations are easy, even, and unlabored. Bilateral lung sounds diminished. Bowel sounds active. R triple lumen femoral CVC WNL with levophed 1 mcg/min, Call light within reach. Bed in lowest position. Bed alarm on.  Will continue to monitor

## 2023-01-23 NOTE — PLAN OF CARE
Problem: Discharge Planning  Goal: Discharge to home or other facility with appropriate resources  Outcome: Progressing  Flowsheets (Taken 1/22/2023 2000)  Discharge to home or other facility with appropriate resources: Identify barriers to discharge with patient and caregiver     Problem: Pain  Goal: Verbalizes/displays adequate comfort level or baseline comfort level  Outcome: Progressing     Problem: Safety - Adult  Goal: Free from fall injury  Outcome: Progressing     Problem: ABCDS Injury Assessment  Goal: Absence of physical injury  Outcome: Progressing     Problem: Skin/Tissue Integrity  Goal: Absence of new skin breakdown  Description: 1. Monitor for areas of redness and/or skin breakdown  2. Assess vascular access sites hourly  3. Every 4-6 hours minimum:  Change oxygen saturation probe site  4. Every 4-6 hours:  If on nasal continuous positive airway pressure, respiratory therapy assess nares and determine need for appliance change or resting period. Outcome: Progressing     Problem: Confusion  Goal: Confusion, delirium, dementia, or psychosis is improved or at baseline  Description: INTERVENTIONS:  1. Assess for possible contributors to thought disturbance, including medications, impaired vision or hearing, underlying metabolic abnormalities, dehydration, psychiatric diagnoses, and notify attending LIP  2. [place] high risk fall precautions, as indicated  3. Provide frequent short contacts to provide reality reorientation, refocusing and direction  4. Decrease environmental stimuli, including noise as appropriate  5. Monitor and intervene to maintain adequate nutrition, hydration, elimination, sleep and activity  6. If unable to ensure safety without constant attention obtain sitter and review sitter guidelines with assigned personnel  7.  Initiate Psychosocial CNS and Spiritual Care consult, as indicated  Outcome: Progressing  Flowsheets (Taken 1/22/2023 2000)  Effect of thought disturbance (confusion, delirium, dementia, or psychosis) are managed with adequate functional status: Assess for contributors to thought disturbance, including medications, impaired vision or hearing, underlying metabolic abnormalities, dehydration, psychiatric diagnoses, notify LIP     Problem: Chronic Conditions and Co-morbidities  Goal: Patient's chronic conditions and co-morbidity symptoms are monitored and maintained or improved  Outcome: Progressing  Flowsheets (Taken 1/22/2023 2000)  Care Plan - Patient's Chronic Conditions and Co-Morbidity Symptoms are Monitored and Maintained or Improved: Monitor and assess patient's chronic conditions and comorbid symptoms for stability, deterioration, or improvement

## 2023-01-23 NOTE — PROGRESS NOTES
Pt placed on PS 5/5, FiO2 30%       01/23/23 1018   Ventilator Settings   FiO2  (S)  30 %   PEEP/CPAP (cmH2O) (S)  5

## 2023-01-23 NOTE — PROGRESS NOTES
FiO2 decraesed to 35%       01/23/23 0758   Patient Observation   Heart Rate 77   Resp (!) 0   SpO2 100 %   Breath Sounds   Right Upper Lobe Diminished   Right Middle Lobe Diminished   Right Lower Lobe Diminished   Left Upper Lobe Diminished   Left Lower Lobe Diminished   Ventilator Settings   FiO2  35 %   Vt (Set, mL) 300 mL   Resp Rate (Set) 18 bmp   PEEP/CPAP (cmH2O) 5   Vent Patient Data (Readings)   Vt (Measured) 290 mL   Peak Inspiratory Pressure (cmH2O) 20 cmH2O   Rate Measured 18 br/min   Minute Volume (L/min) 5.23 Liters   Mean Airway Pressure (cmH2O) 9.2 cmH20   Driving Pressure 10   I:E Ratio 1:2.50   Vent Alarm Settings   High Pressure (cmH2O) 50 cmH2O   Low Minute Volume (lpm) 2 L/min   RR High (bpm) 45 br/min   Additional Respiratoray Assessments   Humidification Source Heated wire   Humidification Temp 37

## 2023-01-23 NOTE — PROGRESS NOTES
Nephrology Progress Note   [email or web address]      Sub/interval history  Patient is intubated and remains in the ICU  Had aspiration with grape and needed bronchoscopy and intubation on 1/21   HD on 1/21 with net 3.4 L UF, post weight 130.5 kg [her target weight is listed at 122 kg]  She has right arm swelling but fistula is working fine     ROS: Unable to obtain  PSFH: No visitor    Scheduled Meds:   midodrine  10 mg Oral TID WC    [START ON 1/24/2023] epoetin claire-epbx  10,000 Units IntraVENous Once per day on Tue Thu Sat    gabapentin  200 mg Oral Nightly    sodium chloride  500 mL IntraVENous Once    sertraline  50 mg Oral Daily    sennosides-docusate sodium  2 tablet Oral Nightly    pantoprazole  40 mg Oral QAM AC    midodrine  10 mg Oral Once per day on Mon Wed Fri    [Held by provider] metoprolol tartrate  25 mg Oral BID    ipratropium-albuterol  1 vial Inhalation Q4H    chlorhexidine  15 mL Mouth/Throat BID    doxercalciferol  3 mcg IntraVENous Once per day on Tue Thu Sat    cefepime  1,000 mg IntraVENous Q24H    apixaban  2.5 mg Oral BID    atorvastatin  40 mg Oral Nightly    hydrocortisone   Rectal BID    sodium chloride flush  5-40 mL IntraVENous 2 times per day    vancomycin (VANCOCIN) intermittent dosing (placeholder)   Other RX Placeholder     Continuous Infusions:   dexmedetomidine HCl in NaCl      norepinephrine 2 mcg/min (01/23/23 0612)    sodium chloride       PRN Meds:.ipratropium-albuterol, oxyCODONE, midazolam, fentanNYL, carboxymethylcellulose PF, LORazepam, sodium chloride flush, sodium chloride, ondansetron **OR** ondansetron, polyethylene glycol, acetaminophen **OR** acetaminophen    Objective/     Vitals:    01/23/23 0545 01/23/23 0600 01/23/23 0700 01/23/23 0758   BP: (!) 116/55 (!) 108/57 (!) 101/54    Pulse: 86 84 82 77   Resp: (!) 5 18 18 (!) 0   Temp:       TempSrc:       SpO2: 100% 99% 100% 100%   Weight:       Height:         24HR INTAKE/OUTPUT:    Intake/Output Summary (Last 24 hours) at 1/23/2023 0827  Last data filed at 1/23/2023 9808  Gross per 24 hour   Intake 108.73 ml   Output --   Net 108.73 ml       Gen: Sedated, intubated  Neck: No JVD  Skin: Unremarkable  Cardiovascular:  S1, S2 without m/r/g   Respiratory: CTA B without w/r/r; respiratory effort normal  Abdomen:  soft, nt, nd,   Extremities: No lower extremity edema, edema noted on right upper extremity along with increased abdominal girth  Neuro/Psy: CT.    Data/  Recent Labs     01/21/23  0500 01/22/23  0347 01/23/23  0422   WBC 7.6 11.2* 12.2*   HGB 7.9* 8.1* 7.7*   HCT 25.4* 25.5* 24.4*   .8* 100.5* 100.2*    214 219       Recent Labs     01/21/23  0500 01/22/23  0347 01/23/23  0458   * 131* 131*   K 4.9 4.3  4.4 4.0   CL 97* 93* 92*   CO2 25 26 23   GLUCOSE 92 124* 119*   PHOS  --  5.3* 5.6*   MG  --  1.90 2.00   BUN 66* 44* 53*   CREATININE 5.9* 4.5* 5.8*   LABGLOM 7* 10* 7*       IMPRESSION/RECOMMENDATIONS:       ESKD. - On HD at [facility]. Last HD prior to admission was on 1/19/23   - Vascular access: RUE AVF. Fistula is working but has arm swelling and likely to have outflow stenosis. Will need to go to the access center.  - EDW: 122.5 kg [was 130.5 kg on 1/21]    Acute on chronic respiratory failure   From aspiration of grape on 1/21  Being treated for HCAP as well per pulmonary  status post bronchoscopy and intubation on 1/21  Renal dose vancomycin, level noted to be 23 on 1/21    Hypotension  Wean off Levophed, add midodrine  Received 500 mL of normal saline bolus on 1/21     Hyperkalemia.  - Potassium level here in the hospital is within normal limit. - Low K+ diet. Anemia.  - Continue KOKO with HD.  -Continue Retacrit to 10,000 units with each dialysis     CKD-MBD.  - Continue Hectorol and calcium acetate. Cristofer Montenegro MD  Office: [phone]  Fax:    [phone] [address] [facility]. [facility]

## 2023-01-23 NOTE — PROGRESS NOTES
Pt declining to allow staff to turn her over to assess skin on backside. Declining Q2 hour turns. Pt educated on the importance of allowing staff to visualize skin to prevent skin integrity breakdown. Will continue to monitor.

## 2023-01-24 VITALS
HEIGHT: 67 IN | WEIGHT: 285.5 LBS | RESPIRATION RATE: 17 BRPM | TEMPERATURE: 98.8 F | HEART RATE: 79 BPM | SYSTOLIC BLOOD PRESSURE: 103 MMHG | BODY MASS INDEX: 44.81 KG/M2 | DIASTOLIC BLOOD PRESSURE: 63 MMHG | OXYGEN SATURATION: 95 %

## 2023-01-24 LAB
ANION GAP SERPL CALCULATED.3IONS-SCNC: 14 MMOL/L (ref 3–16)
BASE EXCESS ARTERIAL: -1.5 MMOL/L (ref -3–3)
BASOPHILS ABSOLUTE: 0 K/UL (ref 0–0.2)
BASOPHILS RELATIVE PERCENT: 0.4 %
BUN BLDV-MCNC: 61 MG/DL (ref 7–20)
CALCIUM SERPL-MCNC: 8.4 MG/DL (ref 8.3–10.6)
CARBOXYHEMOGLOBIN ARTERIAL: 0.7 % (ref 0–1.5)
CHLORIDE BLD-SCNC: 94 MMOL/L (ref 99–110)
CO2: 25 MMOL/L (ref 21–32)
CREAT SERPL-MCNC: 6.4 MG/DL (ref 0.6–1.2)
EOSINOPHILS ABSOLUTE: 0.4 K/UL (ref 0–0.6)
EOSINOPHILS RELATIVE PERCENT: 4.3 %
GFR SERPL CREATININE-BSD FRML MDRD: 6 ML/MIN/{1.73_M2}
GLUCOSE BLD-MCNC: 107 MG/DL (ref 70–99)
GLUCOSE BLD-MCNC: 147 MG/DL (ref 70–99)
GLUCOSE BLD-MCNC: 99 MG/DL (ref 70–99)
HCO3 ARTERIAL: 23.9 MMOL/L (ref 21–29)
HCT VFR BLD CALC: 22.2 % (ref 36–48)
HCT VFR BLD CALC: 26 % (ref 36–48)
HEMOGLOBIN, ART, EXTENDED: 8.2 G/DL (ref 12–16)
HEMOGLOBIN: 7.1 G/DL (ref 12–16)
HEMOGLOBIN: 8.2 G/DL (ref 12–16)
LYMPHOCYTES ABSOLUTE: 0.9 K/UL (ref 1–5.1)
LYMPHOCYTES RELATIVE PERCENT: 11 %
MAGNESIUM: 2 MG/DL (ref 1.8–2.4)
MCH RBC QN AUTO: 32.5 PG (ref 26–34)
MCHC RBC AUTO-ENTMCNC: 32 G/DL (ref 31–36)
MCV RBC AUTO: 101.7 FL (ref 80–100)
METHEMOGLOBIN ARTERIAL: 0.3 %
MONOCYTES ABSOLUTE: 0.6 K/UL (ref 0–1.3)
MONOCYTES RELATIVE PERCENT: 7.5 %
NEUTROPHILS ABSOLUTE: 6.5 K/UL (ref 1.7–7.7)
NEUTROPHILS RELATIVE PERCENT: 76.8 %
O2 SAT, ARTERIAL: 97.2 %
O2 THERAPY: ABNORMAL
PCO2 ARTERIAL: 43.2 MMHG (ref 35–45)
PDW BLD-RTO: 14.7 % (ref 12.4–15.4)
PERFORMED ON: ABNORMAL
PERFORMED ON: NORMAL
PH ARTERIAL: 7.36 (ref 7.35–7.45)
PHOSPHORUS: 6.7 MG/DL (ref 2.5–4.9)
PLATELET # BLD: 197 K/UL (ref 135–450)
PMV BLD AUTO: 8.3 FL (ref 5–10.5)
PO2 ARTERIAL: 97.5 MMHG (ref 75–108)
POTASSIUM SERPL-SCNC: 4.5 MMOL/L (ref 3.5–5.1)
RBC # BLD: 2.19 M/UL (ref 4–5.2)
SARS-COV-2, NAAT: NOT DETECTED
SODIUM BLD-SCNC: 133 MMOL/L (ref 136–145)
TCO2 ARTERIAL: 25.2 MMOL/L
VANCOMYCIN RANDOM: 18.4 UG/ML
WBC # BLD: 8.4 K/UL (ref 4–11)

## 2023-01-24 PROCEDURE — 85025 COMPLETE CBC W/AUTO DIFF WBC: CPT

## 2023-01-24 PROCEDURE — 80202 ASSAY OF VANCOMYCIN: CPT

## 2023-01-24 PROCEDURE — 2580000003 HC RX 258: Performed by: INTERNAL MEDICINE

## 2023-01-24 PROCEDURE — 85014 HEMATOCRIT: CPT

## 2023-01-24 PROCEDURE — 6360000002 HC RX W HCPCS: Performed by: INTERNAL MEDICINE

## 2023-01-24 PROCEDURE — 6370000000 HC RX 637 (ALT 250 FOR IP)

## 2023-01-24 PROCEDURE — 82803 BLOOD GASES ANY COMBINATION: CPT

## 2023-01-24 PROCEDURE — 2700000000 HC OXYGEN THERAPY PER DAY

## 2023-01-24 PROCEDURE — 94640 AIRWAY INHALATION TREATMENT: CPT

## 2023-01-24 PROCEDURE — 94003 VENT MGMT INPAT SUBQ DAY: CPT

## 2023-01-24 PROCEDURE — 83735 ASSAY OF MAGNESIUM: CPT

## 2023-01-24 PROCEDURE — 6370000000 HC RX 637 (ALT 250 FOR IP): Performed by: INTERNAL MEDICINE

## 2023-01-24 PROCEDURE — 85018 HEMOGLOBIN: CPT

## 2023-01-24 PROCEDURE — 94761 N-INVAS EAR/PLS OXIMETRY MLT: CPT

## 2023-01-24 PROCEDURE — 87635 SARS-COV-2 COVID-19 AMP PRB: CPT

## 2023-01-24 PROCEDURE — 80048 BASIC METABOLIC PNL TOTAL CA: CPT

## 2023-01-24 PROCEDURE — 99291 CRITICAL CARE FIRST HOUR: CPT | Performed by: INTERNAL MEDICINE

## 2023-01-24 PROCEDURE — 99239 HOSP IP/OBS DSCHRG MGMT >30: CPT | Performed by: INTERNAL MEDICINE

## 2023-01-24 PROCEDURE — 84100 ASSAY OF PHOSPHORUS: CPT

## 2023-01-24 PROCEDURE — 90935 HEMODIALYSIS ONE EVALUATION: CPT

## 2023-01-24 RX ORDER — SODIUM CHLORIDE 9 MG/ML
INJECTION, SOLUTION INTRAVENOUS PRN
Status: DISCONTINUED | OUTPATIENT
Start: 2023-01-24 | End: 2023-01-24

## 2023-01-24 RX ORDER — LORAZEPAM 0.5 MG/1
0.5 TABLET ORAL EVERY 12 HOURS PRN
Qty: 6 TABLET | Refills: 0 | Status: SHIPPED | OUTPATIENT
Start: 2023-01-24 | End: 2023-01-27

## 2023-01-24 RX ORDER — OXYCODONE HYDROCHLORIDE 5 MG/1
5 TABLET ORAL 2 TIMES DAILY
Qty: 6 TABLET | Refills: 0 | Status: SHIPPED | OUTPATIENT
Start: 2023-01-24 | End: 2023-01-27

## 2023-01-24 RX ADMIN — APIXABAN 2.5 MG: 5 TABLET, FILM COATED ORAL at 08:19

## 2023-01-24 RX ADMIN — SERTRALINE HYDROCHLORIDE 50 MG: 50 TABLET ORAL at 08:19

## 2023-01-24 RX ADMIN — SODIUM CHLORIDE, PRESERVATIVE FREE 10 ML: 5 INJECTION INTRAVENOUS at 08:19

## 2023-01-24 RX ADMIN — DOXERCALCIFEROL 3 MCG: 4 INJECTION, SOLUTION INTRAVENOUS at 07:57

## 2023-01-24 RX ADMIN — EPOETIN ALFA-EPBX 10000 UNITS: 10000 INJECTION, SOLUTION INTRAVENOUS; SUBCUTANEOUS at 08:03

## 2023-01-24 RX ADMIN — IPRATROPIUM BROMIDE AND ALBUTEROL SULFATE 3 ML: .5; 2.5 SOLUTION RESPIRATORY (INHALATION) at 03:27

## 2023-01-24 RX ADMIN — MIDODRINE HYDROCHLORIDE 10 MG: 10 TABLET ORAL at 11:36

## 2023-01-24 RX ADMIN — IPRATROPIUM BROMIDE AND ALBUTEROL SULFATE 3 ML: .5; 2.5 SOLUTION RESPIRATORY (INHALATION) at 11:27

## 2023-01-24 RX ADMIN — Medication 15 ML: at 08:19

## 2023-01-24 RX ADMIN — VANCOMYCIN HYDROCHLORIDE 500 MG: 500 INJECTION, POWDER, LYOPHILIZED, FOR SOLUTION INTRAVENOUS at 11:41

## 2023-01-24 RX ADMIN — MIDODRINE HYDROCHLORIDE 10 MG: 10 TABLET ORAL at 08:19

## 2023-01-24 RX ADMIN — IPRATROPIUM BROMIDE AND ALBUTEROL SULFATE 3 ML: .5; 2.5 SOLUTION RESPIRATORY (INHALATION) at 16:02

## 2023-01-24 RX ADMIN — IPRATROPIUM BROMIDE AND ALBUTEROL SULFATE 3 ML: .5; 2.5 SOLUTION RESPIRATORY (INHALATION) at 08:27

## 2023-01-24 RX ADMIN — ONDANSETRON HYDROCHLORIDE 4 MG: 2 INJECTION, SOLUTION INTRAMUSCULAR; INTRAVENOUS at 12:16

## 2023-01-24 RX ADMIN — PANTOPRAZOLE SODIUM 40 MG: 40 TABLET, DELAYED RELEASE ORAL at 08:19

## 2023-01-24 RX ADMIN — HYDROCORTISONE 2.5%: 25 CREAM TOPICAL at 08:19

## 2023-01-24 ASSESSMENT — PULMONARY FUNCTION TESTS
PIF_VALUE: 22
PIF_VALUE: 20
PIF_VALUE: 20
PIF_VALUE: 18
PIF_VALUE: 6
PIF_VALUE: 25
PIF_VALUE: 6
PIF_VALUE: 6

## 2023-01-24 NOTE — PROGRESS NOTES
Pulmonary & Critical Care Medicine ICU Progress Note    CC: Respiratory failure    Events of Last 24 hours:   CATC 6 hours yesterday   Seen by SLP    Vascular lines: IV: Right femoral line     MV: -current titrate off ventilator at baseline  Vent Mode: AC/VC Resp Rate (Set): 18 bmp/Vt (Set, mL): 300 mL/ /FiO2 : 35 %  Recent Labs     23  0535 23  0545   PHART 7.405 7.360   LQF6AHY 35.6 43.2   PO2ART 60.5* 97.5       IV:   norepinephrine Stopped (23 1215)    sodium chloride         Vitals:  Blood pressure (!) 116/58, pulse 72, temperature 97.1 °F (36.2 °C), resp. rate (!) 8, height 5' 7\" (1.702 m), weight 287 lb 14.7 oz (130.6 kg), SpO2 100 %. on AC    Temp  Av °F (36.7 °C)  Min: 97.1 °F (36.2 °C)  Max: 98.8 °F (37.1 °C)    Intake/Output Summary (Last 24 hours) at 2023 0738  Last data filed at 2023 1600  Gross per 24 hour   Intake 240 ml   Output --   Net 240 ml     PE:  General: on vent, ill appearing    ENT: tracheostomy tube present  Resp: No crackles. No wheezing. CV: S1, S2. +edema  GI: NT, ND, +BS  Skin: Warm and dry. Neuro: PERRL. OETV, + following commands.  Patellar reflexes are symmetric     Scheduled Meds:   midodrine  10 mg Oral TID WC    epoetin claire-epbx  10,000 Units IntraVENous Once per day on  Sat    gabapentin  200 mg Oral Nightly    sodium chloride  500 mL IntraVENous Once    sertraline  50 mg Oral Daily    sennosides-docusate sodium  2 tablet Oral Nightly    pantoprazole  40 mg Oral QAM AC    [Held by provider] metoprolol tartrate  25 mg Oral BID    ipratropium-albuterol  1 vial Inhalation Q4H    chlorhexidine  15 mL Mouth/Throat BID    doxercalciferol  3 mcg IntraVENous Once per day on  Sat    cefepime  1,000 mg IntraVENous Q24H    apixaban  2.5 mg Oral BID    atorvastatin  40 mg Oral Nightly    hydrocortisone   Rectal BID    sodium chloride flush  5-40 mL IntraVENous 2 times per day    vancomycin (VANCOCIN) intermittent dosing (placeholder)   Other RX Placeholder       Data:  CBC:   Recent Labs     01/22/23  0347 01/23/23  0422 01/24/23  0545   WBC 11.2* 12.2* 8.4   HGB 8.1* 7.7* 7.1*   HCT 25.5* 24.4* 22.2*   .5* 100.2* 101.7*    219 197     BMP:   Recent Labs     01/22/23  0347 01/23/23  0458   * 131*   K 4.3  4.4 4.0   CL 93* 92*   CO2 26 23   PHOS 5.3* 5.6*   BUN 44* 53*   CREATININE 4.5* 5.8*     LIVER PROFILE:   No results for input(s): AST, ALT, LIPASE, BILIDIR, BILITOT, ALKPHOS in the last 72 hours. Invalid input(s): AMYLASE,  ALB      Microbiology:  1/19 BC NGTD  1/19 COVID-19 not detected  1/20 tracheal aspirate NRF   1/21 BAL/washing NRF  1/20/23 MRSA probe positive     Imaging:  Chest x-ray 1/22/23  RLL ASD   Mild cardiomegaly     ASSESSMENT:  Acute hypoxic and hypercapnic respiratory failure   Acute metabolic encephalopathy   Septic shock   Healthcare associated pneumonia/aspiration pneumonia  RLL foreign body removal 1/21/2023  Chronic tracheostomy -history of chronic respiratory failure requiring continuous mechanical ventilation through tracheostomy, had been weaned to trach collar   ESRD on HD  Chronic Systolic CHF EF 20%  History of SILVER and pulmonary hypertension  History of A. fib, VTE and CVA on Eliquis  Difficult IV access-not able to thread wire during right IJ attempt  [facility] resident      PLAN:  Mechanical ventilation as per my orders for life threatening respiratory failure.  Will again try CATC as tolerated  Fentanyl and Versed PRN   Head of bed 30 degrees or higher at all times  Vancomycin and cefepime D#5/7, monitor vancomycin level for toxicity  IV Levophed for MAP 65, titrated to off   HD per nephrology, plan for fistula evaluation at access center   Remove right femoral line when no longer needed  Blood sugar control ISS, with goal 150-180  Repeat H/H this am   GI prophylaxis: Pepcid  DVT prophylaxis: Eliquis  MRSA prophylaxis: Bactroban    Total critical care time caring for this patient with life threatening, unstable organ failure, including direct patient contact, management of life support systems, review of data including imaging and labs, discussions with other team members and physicians is 31 minutes, excluding procedures.

## 2023-01-24 NOTE — PROGRESS NOTES
Bedside report and Pt care transferred to [facility]. Pt denies any assistance at this time.

## 2023-01-24 NOTE — PROGRESS NOTES
Reassessment completed as documented on flowsheet. VSS without support from Levophed. Monitoring closely.

## 2023-01-24 NOTE — PROGRESS NOTES
CHG bath completed, complete bed change. Oral care provided. Suctioned large amount of thick yellow secretions from trach. Right femoral central line dressing changed.

## 2023-01-24 NOTE — PROGRESS NOTES
4 Eyes Skin Assessment     The patient is being assess for   Shift Handoff    I agree that 2 RN's have performed a thorough Head to Toe Skin Assessment on the patient. ALL assessment sites listed below have been assessed. Areas assessed for pressure by both nurses:   [x]   Head, Face, and Ears   [x]   Shoulders, Back, and Chest, Abdomen  [x]   Arms, Elbows, and Hands   [x]   Coccyx, Sacrum, and Ischium  [x]   Legs, Feet, and Heels  No open areas noted. Skin Assessed Under all Medical Devices by both nurses:  securement devices              All Mepilex Borders were peeled back and area peeked at by both nurses:  Yes  Please list where Mepilex Borders are located:  coccyx             **SHARE this note so that the co-signing nurse is able to place an eSignature**    Co-signer eSignature: Electronically signed by Robin Vo RN on 1/24/23 at 4:48 AM EST    Does the Patient have Skin Breakdown related to pressure?   Yes LDA WOUND CARE was Initiated documentation include the Viviana-wound, Wound Assessment, Measurements, Dressing Treatment, Drainage, and Color\",     (Insert Photo here)         Stu Prevention initiated:  Yes   Wound Care Orders initiated:  No      WOC nurse consulted for Pressure Injury (Stage 3,4, Unstageable, DTI, NWPT, Complex wounds)and New or Established Ostomies:  No      Primary Nurse eSignature: Electronically signed by Alejandra Schilling RN on 1/24/23 at 4:41 AM EST

## 2023-01-24 NOTE — PROGRESS NOTES
01/23/23 2316   Patient Observation   Heart Rate 70   Resp 12   Breath Sounds   Right Upper Lobe Diminished   Right Middle Lobe Diminished   Right Lower Lobe Diminished   Left Upper Lobe Diminished   Left Lower Lobe Diminished   Vent Information   Vent Mode AC/VC   Ventilator Settings   FiO2  35 %   Vt (Set, mL) 300 mL   Resp Rate (Set) 18 bmp   PEEP/CPAP (cmH2O) 5   Pressure Support (cm H2O) 0 cm H2O   Vent Patient Data (Readings)   Vt (Measured) 291 mL   Peak Inspiratory Pressure (cmH2O) 20 cmH2O   Rate Measured 18 br/min   Minute Volume (L/min) 5.22 Liters   Mean Airway Pressure (cmH2O) 9.1 cmH20   Plateau Pressure (cm H2O) 15 cm H2O   I:E Ratio 1:2.50   Vent Alarm Settings   High Pressure (cmH2O) 50 cmH2O   Low Minute Volume (lpm) 2 L/min   Low Exhaled Vt (ml) 190 mL   RR High (bpm) 45 br/min   Apnea (secs) 20 secs   Additional Respiratoray Assessments   Humidification Source Heated wire   Humidification Temp 37   Circuit Condensation Drained   Ambu Bag With Mask At Bedside Yes   Surgical Airway (Trach)   No placement date or time found. Surgical Airway Type: Tracheostomy  Size (mm): 6   Status Secured   Site Assessment Clean;Dry   Ties Assessment Clean;Dry; Intact   Treatment   $Treatment Type $Inhaled Therapy/Meds

## 2023-01-24 NOTE — PROGRESS NOTES
01/24/23 0327   Patient Observation   Heart Rate 72   Resp (!) 0   Breath Sounds   Right Upper Lobe Diminished   Right Middle Lobe Diminished   Right Lower Lobe Diminished   Left Upper Lobe Diminished   Left Lower Lobe Diminished   Vent Information   Vent Mode AC/VC   Ventilator Settings   FiO2  35 %   Vt (Set, mL) 300 mL   Resp Rate (Set) 18 bmp   PEEP/CPAP (cmH2O) 5   Pressure Support (cm H2O) 0 cm H2O   Vent Patient Data (Readings)   Vt (Measured) 292 mL   Peak Inspiratory Pressure (cmH2O) 20 cmH2O   Rate Measured 18 br/min   Minute Volume (L/min) 5.27 Liters   Mean Airway Pressure (cmH2O) 9.5 cmH20   Plateau Pressure (cm H2O) 15 cm H2O   I:E Ratio 1:2.50   Flow Sensitivity 3 L/min   Vent Alarm Settings   High Pressure (cmH2O) 50 cmH2O   Low Minute Volume (lpm) 2 L/min   Low Exhaled Vt (ml) 190 mL   RR High (bpm) 45 br/min   Apnea (secs) 20 secs   Airway Clearance   Suction Trach   Suction Device Inline suction catheter   Sputum Method Obtained Tracheal   Sputum Amount Large   Sputum Color/Odor Yellow   Sputum Consistency Thick   Surgical Airway (Trach)   No placement date or time found. Surgical Airway Type: Tracheostomy  Size (mm): 6   Status Secured   Site Assessment Clean;Dry   Ties Assessment Clean;Dry; Intact   Treatment   $Treatment Type $Inhaled Therapy/Meds

## 2023-01-24 NOTE — PROGRESS NOTES
Pharmacy Vancomycin Consult     Vancomycin Day: 6 of 7  Current Dosing: Pulse with HD  Current indication: HAP    Recent Labs     01/23/23  0422 01/23/23  0458 01/24/23  0545   BUN  --  53* 61*   CREATININE  --  5.8* 6.4*   WBC 12.2*  --  8.4       Estimated Creatinine Clearance: 11 mL/min (A) (based on SCr of 6.4 mg/dL [facility])). Trough: 18.4    Assessment/Plan:  Will dose with 500 x1 after dialysis. Pharmacy will continue to monitor.      Buckley Brunner, PharmD, [facility], 1/24/2023 8:09 AM

## 2023-01-24 NOTE — DISCHARGE SUMMARY
Name:  Liz Lund  Room:  3010/3010-01  MRN:    [id number]    Discharge Summary      This discharge summary is in conjunction with a complete physical exam done on the day of discharge. Discharging Physician: Ernesto Hinojosa MD      Admit: 1/19/2023  Discharge:  1/24/2023     Diagnoses this Admission    Principal Problem:    Hospital-acquired pneumonia  Active Problems:    Tracheostomy dependence ([facility] [address]    Leukocytosis    Macrocytic anemia    Acute encephalopathy    HCAP (healthcare-associated pneumonia)    Severe sepsis (HCC)    Mucus plug in respiratory tract    Aspiration of foreign body    Septic shock ([facility] [address]  Resolved Problems:    * No resolved hospital problems. *      Procedures (Please Review Full Report for Details)    Hemo dialysis  Bronchoscopy     Consults    IP CONSULT TO NEPHROLOGY  IP CONSULT TO PHARMACY  IP CONSULT TO NEPHROLOGY  IP CONSULT TO PULMONOLOGY      HPI:    The patient is a 71 y.o. female with pmhx of chronic hypoxic respiratory failure, hx of DVT,  CAD, atrial fibrillation anxiety, hx of CVA, chronic pain, ESRD on HD, SILVER, DM type 2, GERD,HLD who presented to [facility] ED with concern for abnormal labs. Patient went to dialysis yesterday, after dialysis her potassium was 7.0, she was sent to the ED. She does report they were unable to take any fluid off of her at dialysis. When asked about shortness of breath, she says \"I guess that's why im here\", mild cough. Denies fever, chills, chest pain, nausea, vomiting, diarrhea, hematuria, dysuria.    Is complaining of rectal pain, has a history of hemorrhoids    Physical Exam at Discharge:  BP (!) 109/55   Pulse 89   Temp 98.8 °F (37.1 °C) (Oral)   Resp 16   Ht 5' 7\" (1.702 m)   Wt 285 lb 7.9 oz (129.5 kg)   SpO2 99%   BMI 44.71 kg/m²     Physical Exam:     General:  elderly AA female, obese  up in bed, on vent support  Awake, alert and well oriented today   Mucous Membranes:  Pink , anicteric  Tracheostomy status with vent connected   Neck: No JVD, no carotid bruit, no thyromegaly  Chest: improved jackie air entry  diminished in bases  Cardiovascular:  RRR S1S2 heard, no murmurs or gallops  Abdomen:  Soft, obese, undistended, non tender, no organomegaly, BS present  Extremities: right UE AV fistula, chronic atrophic ext   No edema or cyanosis. Distal pulses well felt  Right femoral TLC  Neurological - bed bound, non ambulatory, awake, alert and oriented       Hospital Course  # HCAP  #Acute on Chronic hypoxic respiratory failure with tracheostomy     -s/p tracheostomy over 1 year ago, on trach collar with 2 L O2 at baseline  -pt with hx of MDRO, MRSA, pseudomonas   -procalc, WBC elevated, no fevers, BP stable      -CXR as above with atelectasis vs pna , MRSA nasal swab +ve   -COVID and Flu not detected   -pulmonology  following      -Blood cultures pending   -Worsened resp distress needing ICU transfer, placed on vent, had bronch with therapeutic aspiration for foreign body ( pill ) and mucous plugs - improving slowly   - continue on IV vancomycin and cefepime. She will receive 1 more dose at the nursing home after hemodialysis. - await BAL     #ESRD on HD   #Hyperkalemia   -reportedly potassium of 7.0 after dialysis   -5.1 on recheck   -dialysis T, Thurs, Sat schedule   -continue home meds   -nephrology consulted and resumed HD     #Elevated troponin   #CAD  -0.20, chronically elevated   -ASA, statin, BB   -at chem stress test during Sept admission 2022, cards recommended medical management   -no acute ischemic EKG changes  -no CP      #Hypotension   -on midodrine 3x weekly   -holding lopressor - now on pressor support.  Wean off   - consider IVF boluses     #Chronic macrocyctic anemia   -Hgb slightly lower than baseline   -continue to monitor   -no s/s of acute bleeding      #Chronic diastolic HF   -last echo as above   -does not appear acutely decompensated      #Hyponatremia   --improved     #DM type 2  -no home regimen -BG stable today   -continue to monitor      #Atrial fibrillation   Hx of VTE   -NSR on presentation   [facility]         #Hx of CVA   -on eliquis  -ASA, statin         #Anxiety   #Depression   -ativan prn - hold   -on zoloft      #GERD   -PPI      #Chronic pain   -on gabapentin, roxicodone    CBC:   Recent Labs     01/22/23  0347 01/23/23  0422 01/24/23  0545 01/24/23  1153   WBC 11.2* 12.2* 8.4  --    HGB 8.1* 7.7* 7.1* 8.2*   HCT 25.5* 24.4* 22.2* 26.0*   .5* 100.2* 101.7*  --     219 197  --      BMP:   Recent Labs     01/22/23  0347 01/23/23  0458 01/24/23  0545   * 131* 133*   K 4.3  4.4 4.0 4.5   CL 93* 92* 94*   CO2 26 23 25   PHOS 5.3* 5.6* 6.7*   BUN 44* 53* 61*   CREATININE 4.5* 5.8* 6.4*         XR CHEST PORTABLE   Final Result   No significant interval change in multifocal bilateral airspace disease,   greatest at the right base. XR CHEST PORTABLE   Final Result   Worsened consolidation in the right lower lobe. Findings are concerning for   pneumonia. Background of moderate worsened pulmonary edema. XR CHEST PORTABLE   Final Result   1. Right basilar airspace disease. This could reflect atelectasis versus   pneumonia in the appropriate clinical setting. 2.  Mild cardiomegaly                Discharge Medications     Medication List        START taking these medications      cefepime  infusion  Commonly known as: MAXIPIME  Infuse 2,000 mg intravenously once for 1 dose Give after HD on 1/26/2023  Start taking on: January 26, 2023     vancomycin  infusion  Commonly known as: VANCOCIN  Infuse 500 mg intravenously once for 1 dose Give after HD on 1/26/2023.   Start taking on: January 26, 2023            CHANGE how you take these medications      metoprolol tartrate 25 MG tablet  Commonly known as: LOPRESSOR  Take 1 tablet by mouth 2 times daily Hold for SBP less than 110  What changed: additional instructions            CONTINUE taking these medications      acetaminophen 325 MG tablet  Commonly known as: TYLENOL     apixaban 2.5 MG Tabs tablet  Commonly known as: ELIQUIS     atorvastatin 40 MG tablet  Commonly known as: LIPITOR  Take 1 tablet by mouth nightly     BISACODYL RE     calcium acetate 667 MG Tabs     carboxymethylcellulose PF 1 % Gel ophthalmic gel  Commonly known as: THERATEARS  Place 1 drop into both eyes every 4 hours as needed for Dry Eyes     chlorhexidine 0.12 % solution  Commonly known as: PERIDEX     diphenhydrAMINE 25 MG capsule  Commonly known as: BENADRYL     DULCOLAX PO     gabapentin 300 MG capsule  Commonly known as: NEURONTIN     hydrocortisone 1 % cream     hydrocortisone 2.5 % Crea rectal cream  Commonly known as: ANUSOL-HC  Place rectally 2 times daily     ipratropium-albuterol 0.5-2.5 (3) MG/3ML Soln nebulizer solution  Commonly known as: DUONEB     loperamide 2 MG capsule  Commonly known as: IMODIUM     LORazepam 0.5 MG tablet  Commonly known as: ATIVAN  Take 1 tablet by mouth every 12 hours as needed for Anxiety for up to 3 days. Max Daily Amount: 1 mg     midodrine 5 MG tablet  Commonly known as: PROAMATINE     omeprazole 20 MG delayed release capsule  Commonly known as: PRILOSEC     oxyCODONE 5 MG immediate release tablet  Commonly known as: ROXICODONE  Take 1 tablet by mouth in the morning and at bedtime for 3 days.  Max Daily Amount: 10 mg     polyethylene glycol 17 g packet  Commonly known as: GLYCOLAX     promethazine 12.5 MG tablet  Commonly known as: PHENERGAN     sennosides-docusate sodium 8.6-50 MG tablet  Commonly known as: SENOKOT-S     sertraline 50 MG tablet  Commonly known as: ZOLOFT     sodium phosphate 7-19 GM/118ML     traZODone 50 MG tablet  Commonly known as: DESYREL     UNABLE TO FIND     vitamin B-12 500 MCG tablet  Commonly known as: CYANOCOBALAMIN            STOP taking these medications      furosemide 20 MG tablet  Commonly known as: LASIX     lidocaine 5 % ointment  Commonly known as: XYLOCAINE     warfarin 3 MG tablet  Commonly known as: COUMADIN               Where to Get Your Medications        You can get these medications from any pharmacy    Bring a paper prescription for each of these medications  LORazepam 0.5 MG tablet  metoprolol tartrate 25 MG tablet  oxyCODONE 5 MG immediate release tablet  vancomycin  infusion       Information about where to get these medications is not yet available    Ask your nurse or doctor about these medications  cefepime  infusion           Discharge Condition/Location: Stable    Follow Up: Follow up with NH doctor. More than 30 mts spent.          Bria Damon MD 1/24/2023 1:32 PM

## 2023-01-24 NOTE — CARE COORDINATION
DISCHARGE ORDER  Date/Time 2023 4:28 PM  Completed by: Cira Live RN, Case Management    Patient Name: John Notice    : 1950      Admit order Date and Status: INPT   Noted discharge order. (verify MD's last order for status of admission/Traditional Medicare [place] Inpatient qualifying stay required for SNF)    Confirmed discharge plan with:              Patient:  Yes              When pt confirms DC plan does any support person need to be contacted by CM Yes if yes Sarbjit Menon dtr_____                      Discharge to Facility: [id number] phone number for staff giving report: [phone]   Pre-certification completed: [facility] Exemption Notification (HENS) completed: N/A  Discharge orders and Continuity of Care faxed to facility:  facility to obtain from [employer]      Transportation:               Medical Transport explained with choice list offered to pt/family. Choice:(no preference)  Agency used: [address] up time:   [phone]      Pt/family/Nursing/Facility aware of  time:   Yes Names: pt, pt dtr, nurse, facility,   Ambulance form completed:  yes:      Date Last IMM Given: 23    Comments:-    Pt is being d/c'd to [facility] today. Pt's O2 sats are 100% on 35% trach mask. Discharge timeout done with [facility]. All discharge needs and concerns addressed. Discharging nurse to complete AMADEO, reconcile AVS, and place final copy with patient's discharge packet. Discharging RN to ensure that written prescriptions for  Level II medications are sent with patient to the facility as per protocol.

## 2023-01-24 NOTE — PROGRESS NOTES
1747  Pt left ICU at this time via stretcher with [facility] to be transported back to [facility] per orders. All of pt's personal belongings bagged up and sent with pt back over to [facility]. Pt left in stable condition.

## 2023-01-24 NOTE — PROGRESS NOTES
RT Nebulizer Bronchodilator Protocol Note    There is a bronchodilator order in the chart from a provider indicating to follow the RT Bronchodilator Protocol and there is an Initiate RT Bronchodilator Protocol order as well (see protocol at bottom of note). CXR Findings:  No results found. The findings from the last RT Protocol Assessment were as follows:  Smoking: Smoker 15 pack years or more  Respiratory Pattern: Regular pattern and RR 12-20 bpm  Breath Sounds: Slightly diminished and/or crackles  Cough: Strong, productive  Indication for Bronchodilator Therapy: On home bronchodilators  Bronchodilator Assessment Score: 4    Aerosolized bronchodilator medication orders have been revised according to the RT Nebulizer Bronchodilator Protocol below. Respiratory Therapist to perform RT Therapy Protocol Assessment initially then follow the protocol. Repeat RT Therapy Protocol Assessment PRN for score 0-3 or on second treatment, BID, and PRN for scores above 3. No Indications - adjust the frequency to every 6 hours PRN wheezing or bronchospasm, if no treatments needed after 48 hours then discontinue using Per Protocol order mode. If indication present, adjust the RT bronchodilator orders based on the Bronchodilator Assessment Score as indicated below. If a patient is on this medication at home then do not decrease Frequency below that used at home. 0-3 - enter or revise RT bronchodilator order(s) to equivalent RT Bronchodilator order with Frequency of every 4 hours PRN for wheezing or increased work of breathing using Per Protocol order mode. 4-6 - enter or revise RT Bronchodilator order(s) to two equivalent RT bronchodilator orders with one order with BID Frequency and one order with Frequency of every 4 hours PRN wheezing or increased work of breathing using Per Protocol order mode.          7-10 - enter or revise RT Bronchodilator order(s) to two equivalent RT bronchodilator orders with one order with TID Frequency and one order with Frequency of every 4 hours PRN wheezing or increased work of breathing using Per Protocol order mode. 11-13 - enter or revise RT Bronchodilator order(s) to one equivalent RT bronchodilator order with QID Frequency and an Albuterol order with Frequency of every 4 hours PRN wheezing or increased work of breathing using Per Protocol order mode. Greater than 13 - enter or revise RT Bronchodilator order(s) to one equivalent RT bronchodilator order with every 4 hours Frequency and an Albuterol order with Frequency of every 2 hours PRN wheezing or increased work of breathing using Per Protocol order mode. RT to enter RT Home Evaluation for COPD & MDI Assessment order using Per Protocol order mode.     Electronically signed by Sharifa Kaye RCP on 1/24/2023 at 8:30 AM

## 2023-01-24 NOTE — PROGRESS NOTES
RT Inhaler-Nebulizer Bronchodilator Protocol Note    There is a bronchodilator order in the chart from a provider indicating to follow the RT Bronchodilator Protocol and there is an Initiate RT Inhaler-Nebulizer Bronchodilator Protocol order as well (see protocol at bottom of note). CXR Findings:  XR CHEST PORTABLE    Result Date: 1/22/2023  No significant interval change in multifocal bilateral airspace disease, greatest at the right base. The findings from the last RT Protocol Assessment were as follows:   History Pulmonary Disease: Smoker 15 pack years or more  Respiratory Pattern: Regular pattern and RR 12-20 bpm  Breath Sounds: Slightly diminished and/or crackles  Cough: Strong, productive  Indication for Bronchodilator Therapy: On home bronchodilators  Bronchodilator Assessment Score: 4    Aerosolized bronchodilator medication orders have been revised according to the RT Inhaler-Nebulizer Bronchodilator Protocol below. Respiratory Therapist to perform RT Therapy Protocol Assessment initially then follow the protocol. Repeat RT Therapy Protocol Assessment PRN for score 0-3 or on second treatment, BID, and PRN for scores above 3. No Indications - adjust the frequency to every 6 hours PRN wheezing or bronchospasm, if no treatments needed after 48 hours then discontinue using Per Protocol order mode. If indication present, adjust the RT bronchodilator orders based on the Bronchodilator Assessment Score as indicated below. Use Inhaler orders unless patient has one or more of the following: on home nebulizer, not able to hold breath for 10 seconds, is not alert and oriented, cannot activate and use MDI correctly, or respiratory rate 25 breaths per minute or more, then use the equivalent nebulizer order(s) with same Frequency and PRN reasons based on the score. If a patient is on this medication at home then do not decrease Frequency below that used at home.     0-3 - enter or revise RT bronchodilator order(s) to equivalent RT Bronchodilator order with Frequency of every 4 hours PRN for wheezing or increased work of breathing using Per Protocol order mode. 4-6 - enter or revise RT Bronchodilator order(s) to two equivalent RT bronchodilator orders with one order with BID Frequency and one order with Frequency of every 4 hours PRN wheezing or increased work of breathing using Per Protocol order mode. 7-10 - enter or revise RT Bronchodilator order(s) to two equivalent RT bronchodilator orders with one order with TID Frequency and one order with Frequency of every 4 hours PRN wheezing or increased work of breathing using Per Protocol order mode. 11-13 - enter or revise RT Bronchodilator order(s) to one equivalent RT bronchodilator order with QID Frequency and an Albuterol order with Frequency of every 4 hours PRN wheezing or increased work of breathing using Per Protocol order mode. Greater than 13 - enter or revise RT Bronchodilator order(s) to one equivalent RT bronchodilator order with every 4 hours Frequency and an Albuterol order with Frequency of every 2 hours PRN wheezing or increased work of breathing using Per Protocol order mode.        Electronically signed by Panchito Hawkins RCP on 1/23/2023 at 7:39 PM

## 2023-01-24 NOTE — FLOWSHEET NOTE
01/24/23 0800   Vitals   Temp 97 °F (36.1 °C)   Temp Source Oral   Heart Rate 71   Heart Rate Source Monitor   Resp 18   BP (!) 103/52   MAP (Calculated) 69   MAP (mmHg) 67   BP Method Automatic   Oxygen Therapy   SpO2 100 %   O2 Device Ventilator  (trach)   FiO2  35 %     Vital signs stable. BP remains soft. Shift assessment, completed, see flow sheet. Able to follow commands. Chronic trache vent with # 6 Shiley XLT distal ETT, 18 / 300 / 35 %/ 5. NSR, Respirations are easy, even, and unlabored. Bilateral lung sounds diminished. Bowel sounds active. R triple lumen femoral CVC WNL. Call light within reach. Bed in lowest position. Bed alarm on.  Will continue to monitor

## 2023-01-24 NOTE — PROGRESS NOTES
Nephrology Progress Note   [email or web address]      Sub/interval history  Patient is intubated and remains in the ICU  Had aspiration with grape and needed bronchoscopy and intubation on 1/21  Seen on dialysis  Access is working and going for 1-2 kg as tolerated    ROS: More alert, feeling SOB  PSFH: No visitor    Scheduled Meds:   vancomycin  500 mg IntraVENous Once    midodrine  10 mg Oral TID WC    epoetin claire-epbx  10,000 Units IntraVENous Once per day on Tue Thu Sat    gabapentin  200 mg Oral Nightly    sodium chloride  500 mL IntraVENous Once    sertraline  50 mg Oral Daily    sennosides-docusate sodium  2 tablet Oral Nightly    pantoprazole  40 mg Oral QAM AC    [Held by provider] metoprolol tartrate  25 mg Oral BID    ipratropium-albuterol  1 vial Inhalation Q4H    chlorhexidine  15 mL Mouth/Throat BID    doxercalciferol  3 mcg IntraVENous Once per day on Tue Thu Sat    cefepime  1,000 mg IntraVENous Q24H    apixaban  2.5 mg Oral BID    atorvastatin  40 mg Oral Nightly    hydrocortisone   Rectal BID    sodium chloride flush  5-40 mL IntraVENous 2 times per day    vancomycin (VANCOCIN) intermittent dosing (placeholder)   Other RX Placeholder     Continuous Infusions:   sodium chloride       PRN Meds:.ipratropium-albuterol, oxyCODONE, fentanNYL, carboxymethylcellulose PF, LORazepam, sodium chloride flush, sodium chloride, ondansetron **OR** ondansetron, polyethylene glycol, acetaminophen **OR** acetaminophen    Objective/     Vitals:    01/24/23 0700 01/24/23 0800 01/24/23 0829 01/24/23 0900   BP: (!) 116/58 (!) 103/52 90/67 (!) 80/57   Pulse: 72 71 79 83   Resp: (!) 8 18 22 14   Temp:  97 °F (36.1 °C)     TempSrc:  Oral     SpO2:  100% 100% 100%   Weight:       Height:         24HR INTAKE/OUTPUT:    Intake/Output Summary (Last 24 hours) at 1/24/2023 0935  Last data filed at 1/23/2023 1600  Gross per 24 hour   Intake 240 ml   Output --   Net 240 ml       Gen: Sedated, intubated  Neck: No JVD  Skin: Unremarkable  Cardiovascular:  S1, S2 without m/r/g   Respiratory: CTA B without w/r/r; respiratory effort normal  Abdomen:  soft, nt, nd,   Extremities: No lower extremity edema, edema noted on right upper extremity along with increased abdominal girth  Neuro/Psy: CT.    Data/  Recent Labs     01/22/23  0347 01/23/23  0422 01/24/23  0545   WBC 11.2* 12.2* 8.4   HGB 8.1* 7.7* 7.1*   HCT 25.5* 24.4* 22.2*   .5* 100.2* 101.7*    219 197       Recent Labs     01/22/23  0347 01/23/23  0458 01/24/23  0545   * 131* 133*   K 4.3  4.4 4.0 4.5   CL 93* 92* 94*   CO2 26 23 25   GLUCOSE 124* 119* 107*   PHOS 5.3* 5.6* 6.7*   MG 1.90 2.00 2.00   BUN 44* 53* 61*   CREATININE 4.5* 5.8* 6.4*   LABGLOM 10* 7* 6*       IMPRESSION/RECOMMENDATIONS:       ESKD. - On HD at [facility]. Last HD prior to admission was on 1/19/23   - Vascular access: RUE AVF. Fistula is working but has arm swelling and likely to have outflow stenosis. Will need to go to the access center.  - EDW: 122.5 kg [was 130.5 kg on 1/21  - [id number] [address] Wang for discharge, will need additional dose of Vancomycin and Cefepime at the dialysis unit to complete therapy     Acute on chronic respiratory failure   From aspiration of grape on 1/21  Being treated for HCAP as well per pulmonary  status post bronchoscopy and intubation on 1/21  Renal dose vancomycin, level noted to be 23 on 1/21    Hypotension  Wean off Levophed, add midodrine  Received 500 mL of normal saline bolus on 1/21     Hyperkalemia.  - Potassium level here in the hospital is within normal limit. - Low K+ diet. Anemia.  - Continue KOKO with HD.  -Continue Retacrit to 10,000 units with each dialysis     CKD-MBD.  - Continue Hectorol and calcium acetate. nAne Heart MD  Office: [phone]  Fax:    [phone] [address] [facility]

## 2023-01-24 NOTE — PROGRESS NOTES
Fred Ravi          01/24/23 1551   Encounter Summary   Encounter Overview/Reason  Spiritual/Emotional Needs   Service Provided For: Patient   Referral/Consult From: [address] Children;Family members   Last Encounter  01/24/23  (emotional support/prayer)   Complexity of Encounter Moderate   Begin Time 1340   End Time  1355   Total Time Calculated 15 min   Spiritual/Emotional needs   Type Spiritual Support;Emotional Distress   Assessment/Intervention/Outcome   Assessment Anxious; Concerns with suffering   Intervention Active listening;Explored/Affirmed feelings, thoughts, concerns;Nurtured Hope;Prayer (assurance of)/[place]   Outcome Comfort;Peace; Restored Hope

## 2023-01-24 NOTE — FLOWSHEET NOTE
01/24/23 0659 01/24/23 1003   Vital Signs   BP (!) 116/58 (!) 94/53   Temp 97.1 °F (36.2 °C) 97 °F (36.1 °C)   Heart Rate 70 95   Resp 10 17     Treatment time: 3 hours    Net UF: 1050ml    Pre weight: 130.6 kg (bed scale)  Post weight: 129.5 kg (bed scale)  EDW: 122.5 kg     Access used: NELSY AVF  Access function: High arterial pressures, unable to achieve Qb.300. Right arm grossly edematous. Medications or blood products given: Retacrit 88053 units IVP. Hectorol 3 mcg IVP. Regular outpatient schedule: Do [facility]RE[facility]    Summary of response to treatment: Tolerated 3 hour HD tx poorly, SBP soft throughout tx, . BP dropped to 79/48 last 30 minutes of tx,  UFG decreased to minimum, 200 ml NS given. BP improved to 87/69, 100 ml NS given, BP improved to 98/52, completed remainder of tx with SBP>90. Net UF 1050 ml. Crit line: H1=25.5, H2=25.5, difference of 0, no refill present. Ending profile B. Copy of dialysis treatment record placed in chart, to be scanned into EMR.      Report given to Jacinta Chavis RN

## 2023-01-24 NOTE — PROGRESS NOTES
01/23/23 1940   Patient Observation   Heart Rate 88   Resp 15   SpO2 100 %   Breath Sounds   Right Upper Lobe Diminished   Right Middle Lobe Diminished   Right Lower Lobe Diminished   Left Upper Lobe Diminished   Left Lower Lobe Diminished   Vent Information   Vent Mode AC/VC   Ventilator Settings   FiO2  35 %   Vt (Set, mL) 300 mL   Resp Rate (Set) 18 bmp   PEEP/CPAP (cmH2O) 5   Pressure Support (cm H2O) 0 cm H2O   Vent Patient Data (Readings)   Vt (Measured) 591 mL   Peak Inspiratory Pressure (cmH2O) 22 cmH2O   Rate Measured 24 br/min   Minute Volume (L/min) 7.83 Liters   Mean Airway Pressure (cmH2O) 7.6 cmH20   Plateau Pressure (cm H2O) 15 cm H2O   I:E Ratio 1:2.50   Flow Sensitivity 3 L/min   Vent Alarm Settings   High Pressure (cmH2O) 50 cmH2O   Low Minute Volume (lpm) 2 L/min   Low Exhaled Vt (ml) 190 mL   RR High (bpm) 45 br/min   Apnea (secs) 20 secs   Additional Respiratoray Assessments   Humidification Source Heated wire   Humidification Temp 37   Circuit Condensation Drained   Ambu Bag With Mask At Bedside Yes   Airway Clearance   Suction Trach   Suction Device Inline suction catheter   Sputum Method Obtained Tracheal   Sputum Amount Large   Sputum Color/Odor Yellow   Sputum Consistency Thick   Surgical Airway (Trach)   No placement date or time found. Surgical Airway Type: Tracheostomy  Size (mm): 6   Status Secured   Site Assessment Clean;Dry   Ties Assessment Clean;Dry; Intact

## 2023-01-24 NOTE — PROGRESS NOTES
Shift assessment completed as documented on flowsheet. VSS. Pt watching TV. IV infusing per MAR. Call light within reach. Monitoring closely.

## 2023-01-24 NOTE — DISCHARGE INSTR - COC
Continuity of Care Form    Patient Name: Sarbjit Pina   :  [id number]  MRN:  [id number]    [facility] date:  2023  Discharge date:  23    Code Status Order: Full Code   Advance Directives:     Admitting Physician:  Gemma Phelps MD  PCP: Rene Plascencia MD    Discharging Nurse: [facility] Unit/Room#: 3010/3010-01  Discharging Unit Phone Number:     Emergency Contact:   Extended Emergency Contact Information  Primary Emergency Contact: [address] Phone: [phone]  Relation: Child    Past Surgical History:  Past Surgical History:   Procedure Laterality Date    COLECTOMY N/A 5/10/2022    EXPLORATORY LAPAROTOMY, LYSIS OF ADHESIONS performed by Leeanne Cha MD at [address] [place] [address]       Immunization History: There is no immunization history on file for this patient.     Active Problems:  Patient Active Problem List   Diagnosis Code    Unresponsive episode R41.89    Syncope R55    Acute respiratory failure with hypoxia and hypercapnia (HCC) J96.01, J96.02    Pneumonia due to infectious organism J18.9    ESRD on dialysis ([address] N18.6, Z99.2    Chronic respiratory failure requiring continuous mechanical ventilation through tracheostomy ([facility] J96.10, Z93.0, Z99.11    Acute on chronic respiratory failure with hypoxia (HCC) J96.21    Acute pulmonary edema (HCC) J81.0    Rectal pain K62.89    History of CVA (cerebrovascular accident) Z86.73    Hospital-acquired pneumonia J18.9, Y95    Anemia, chronic disease D63.8    Anxiety F41.9    Chronic diastolic heart failure (HCC) I50.32    Dysphagia, pharyngeal phase R13.13    Primary hypertension I10    GERD (gastroesophageal reflux disease) K21.9    Morbid obesity with BMI of 50.0-59.9, adult ([facility] E66.01, Z68.43    SILVER (obstructive sleep apnea) G47.33    Abnormal chest x-ray R93.89    Vaginal bleeding N93.9    SBO (small bowel obstruction) ([facility] K56.609    Fecal impaction (HCC) K56.41    Chest pain R07.9    Coronary artery disease involving native heart with unstable angina pectoris ([facility]) I25.110    Mixed hyperlipidemia E78.2    ESRD on hemodialysis ([facility]) N18.6, Z99.2    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    Leukocytosis D72.829    Macrocytic anemia D53.9    Acute encephalopathy G93.40    HCAP (healthcare-associated pneumonia) J18.9    Severe sepsis ([facility]) A41.9, R65.20    Mucus plug in respiratory tract T17.998A    Aspiration of foreign body T17.908A    Septic shock ([address] A41.9, R65.21       Isolation/Infection:   Isolation            Contact          Patient Infection Status       Infection Onset Added Last Indicated Last Indicated By Review Planned Expiration Resolved Resolved By    Tasia auris 05/26/22 10/27/22 10/27/22 Rainell Boxer, RN        Added from external infection. MDRO (multi-drug resistant organism) 22 Culture, Respiratory        MRSA 21 MRSA DNA Probe, Nasal        Added from external infection. Resolved    COVID-19 (Rule Out) 23 COVID-19 & Influenza Combo (Ordered)   23 Rule-Out Test Resulted    COVID-19 (Rule Out) 10/25/22 10/25/22 10/26/22 COVID-19, Rapid (Ordered)   10/26/22 Rule-Out Test Resulted    COVID-19 (Rule Out) 22 COVID-19, Rapid (Ordered)   22 Rule-Out Test Resulted    COVID-19 (Rule Out) 09/15/22 09/15/22 09/15/22 COVID-19, Rapid (Ordered)   09/15/22 Rule-Out Test Resulted    COVID-19 (Rule Out) 22 COVID-19 & Influenza Combo (Ordered)   22 Matthew Mckinnon RN    COVID-19 (Rule Out) 22 COVID-19, Rapid (Ordered)   22 Rule-Out Test Resulted    C-diff Rule Out 22 Clostridium difficile toxin/antigen (Ordered)   22 Jair Gonzalez RN    Order .     COVID-19 (Rule Out) 22 COVID-19 & Influenza Combo (Ordered)   22 Rule-Out Test Resulted    COVID-19 (Rule Out) 01/10/22 01/10/22 01/11/22 COVID-19 & Influenza Combo (Ordered)   01/11/22 Rule-Out Test Resulted            Nurse Assessment:  Last Vital Signs: BP (!) 109/55   Pulse 89   Temp 98.8 °F (37.1 °C) (Oral)   Resp 16   Ht 5' 7\" (1.702 m)   Wt 285 lb 7.9 oz (129.5 kg)   SpO2 99%   BMI 44.71 kg/m²     Last documented pain score (0-10 scale): Pain Level: 10  Last Weight:   Wt Readings from Last 1 Encounters:   01/24/23 285 lb 7.9 oz (129.5 kg)     Mental Status:  oriented, alert, coherent, and logical    IV Access:  - Peripheral IV - site  L Antecubital, insertion date: 1/21/23    Nursing Mobility/ADLs:  Walking   Dependent  Transfer  Dependent  Bathing  Dependent  Dressing  Dependent  Toileting  Dependent  Feeding  Independent  Med Admin  Assisted  Med Delivery   whole    Wound Care Documentation and Therapy:  Negative Pressure Wound Therapy Abdomen Medial (Active)   Number of days: 258       Wound 04/26/22 Thigh Proximal;Right;Posterior (Active)   Number of days: 272       Wound 05/08/22 Thigh Anterior;Right;Medial wound upper inner rigth thigh (Active)   Number of days: 260       Incision 05/10/22 Abdomen Medial (Active)   Number of days: 258        Elimination:  Continence: Bowel: No  Bladder: anuric  Urinary Catheter: None   Colostomy/Ileostomy/Ileal Conduit: No       Date of Last BM: 1/24/23    Intake/Output Summary (Last 24 hours) at 1/24/2023 1431  Last data filed at 1/23/2023 1600  Gross per 24 hour   Intake 120 ml   Output --   Net 120 ml     I/O last 3 completed shifts: In: 318.7 [P.O.:240; I.V.:28.6; IV Piggyback:50]  Out: -     Safety Concerns: At Risk for Falls and Aspiration Risk    Impairments/Disabilities:      None    Nutrition Therapy:  Current Nutrition Therapy:   - Oral Diet:  Dysphagia soft and bite size, thin liquids    Routes of Feeding: Oral  Liquids:  Thin Liquids  Daily Fluid Restriction: no  Last Modified Barium Swallow with Video (Video Swallowing Test): done on 1/23/23    Treatments at the Time of Hospital Discharge:   Respiratory Treatments: PRN breathing TX  Oxygen Therapy:  Trache collar 30% fio2  Ventilator:    - No ventilator support    Rehab Therapies: Speech/Language Therapy  Weight Bearing Status/Restrictions: No weight bearing restrictions  Other Medical Equipment (for information only, NOT a DME order):  hospital bed  Other Treatments: n/a    Patient's personal belongings (please select all that are sent with patient): PMV    RN SIGNATURE:  Electronically signed by Christian Walker RN on 1/24/23 at 2:38 PM EST    CASE MANAGEMENT/SOCIAL WORK SECTION    Inpatient Status Date: 1/19/23    Readmission Risk Assessment Score:  Readmission Risk              Risk of Unplanned Readmission:  40           Discharging to Facility/ Agency   Name:   Name: [facility])  Address: [address] , [place] [address]  Fax: [phone]   Address:  Phone:  Fax:    Dialysis Facility (if applicable)   Name:  Address:  Dialysis Schedule:  Phone:  Fax:    / signature: Electronically signed by Gerry Braun RN on 1/24/23 at 3:48 PM EST    PHYSICIAN SECTION    Prognosis: Fair    Condition at Discharge: Stable    Rehab Potential (if transferring to Rehab): Guarded    Recommended Labs or Other Treatments After Discharge: Wean of vent    Physician Certification: I certify the above information and transfer of Sofi Gonzales  is necessary for the continuing treatment of the diagnosis listed and that she requires [facility] for greater 30 days.      Update Admission H&P: No change in H&P    PHYSICIAN SIGNATURE:  Maynor Barraza MD 1/24/2023 4:21 PM

## 2023-01-24 NOTE — PROGRESS NOTES
Bedside report and Pt care transferred to [facility]. Pt denies any assistance at this time.

## 2023-01-25 NOTE — PROGRESS NOTES
Physician Progress Note      PATIENT:               Gabriela Figueroa  CSN #:                  [id number]  :                       1950  ADMIT DATE:       2023 2:16 PM  [address] [place] DATE:        2023 5:47 PM  RESPONDING  PROVIDER #:        Sandra Ruiz MD          QUERY TEXT:    Patient with bronchoscopy on . Per procedure note-Bilateral thick,   purulent secretions bilateral airways with mucous plugs. Please further   specify the bilateral airways sites: The medical record reflects the following:  Risk Factors: advanced age, chronic trach, chronic resp failure  Clinical Indicators: Bilateral thick, purulent secretions bilateral airways   with mucous plugs. Treatment: Saline injection followed by a therapeutic aspiration was   performed. Thank you,  Fareed Ag RN,BSN,CCDS,CRCR  Options provided:  -- Aspiration of mucus plugs from bilateral airways including, please specify   sites. -- Aspiration of mucus plugs from lingula bronchus  -- Aspiration of mucus plugs from LLL bronchus  -- Aspiration of mucus plugs from RLL bronchus  -- Aspiration of mucus plugs from left main bronchus  -- Aspiration of mucus plugs from right main bronchus  -- Aspiration of mucus plugs from RML bronchus  -- Aspiration of mucus plugs from MICHAEL bronchus  -- Aspiration of mucus plugs from RUL bronchus  -- Other - I will add my own diagnosis  -- Disagree - Not applicable / Not valid  -- Disagree - Clinically unable to determine / Unknown  -- Refer to Clinical Documentation Reviewer    PROVIDER RESPONSE TEXT:    The aspiration of mucus plugs from RLL bronchus.     Query created by: Dakota Green on 2023 12:11 PM      Electronically signed by:  Sandra Ruiz MD 2023 8:36 AM

## 2023-01-29 ENCOUNTER — APPOINTMENT (OUTPATIENT)
Dept: GENERAL RADIOLOGY | Age: 73
End: 2023-01-29
Payer: COMMERCIAL

## 2023-01-29 ENCOUNTER — HOSPITAL ENCOUNTER (EMERGENCY)
Age: 73
Discharge: NURSING, REHAB OR LONG-TERM CARE | End: 2023-01-29
Attending: EMERGENCY MEDICINE
Payer: COMMERCIAL

## 2023-01-29 VITALS
SYSTOLIC BLOOD PRESSURE: 151 MMHG | OXYGEN SATURATION: 98 % | RESPIRATION RATE: 19 BRPM | TEMPERATURE: 98.4 F | HEART RATE: 75 BPM | DIASTOLIC BLOOD PRESSURE: 122 MMHG

## 2023-01-29 DIAGNOSIS — N18.6 ESRD (END STAGE RENAL DISEASE) (HCC): ICD-10-CM

## 2023-01-29 DIAGNOSIS — E87.1 HYPONATREMIA: ICD-10-CM

## 2023-01-29 DIAGNOSIS — E86.0 DEHYDRATION: Primary | ICD-10-CM

## 2023-01-29 DIAGNOSIS — Z99.11 VENTILATOR DEPENDENCE (HCC): ICD-10-CM

## 2023-01-29 LAB
A/G RATIO: 0.5 (ref 1.1–2.2)
ALBUMIN SERPL-MCNC: 2.7 G/DL (ref 3.4–5)
ALP BLD-CCNC: 93 U/L (ref 40–129)
ALT SERPL-CCNC: 9 U/L (ref 10–40)
ANION GAP SERPL CALCULATED.3IONS-SCNC: 12 MMOL/L (ref 3–16)
AST SERPL-CCNC: 28 U/L (ref 15–37)
BASOPHILS ABSOLUTE: 0.1 K/UL (ref 0–0.2)
BASOPHILS RELATIVE PERCENT: 0.6 %
BILIRUB SERPL-MCNC: 0.4 MG/DL (ref 0–1)
BUN BLDV-MCNC: 43 MG/DL (ref 7–20)
CALCIUM SERPL-MCNC: 8.4 MG/DL (ref 8.3–10.6)
CHLORIDE BLD-SCNC: 92 MMOL/L (ref 99–110)
CO2: 23 MMOL/L (ref 21–32)
CREAT SERPL-MCNC: 6.2 MG/DL (ref 0.6–1.2)
EKG ATRIAL RATE: 74 BPM
EKG DIAGNOSIS: NORMAL
EKG P AXIS: 60 DEGREES
EKG P-R INTERVAL: 240 MS
EKG Q-T INTERVAL: 368 MS
EKG QRS DURATION: 84 MS
EKG QTC CALCULATION (BAZETT): 408 MS
EKG R AXIS: -6 DEGREES
EKG T AXIS: 36 DEGREES
EKG VENTRICULAR RATE: 74 BPM
EOSINOPHILS ABSOLUTE: 0.3 K/UL (ref 0–0.6)
EOSINOPHILS RELATIVE PERCENT: 3 %
GFR SERPL CREATININE-BSD FRML MDRD: 7 ML/MIN/{1.73_M2}
GLUCOSE BLD-MCNC: 125 MG/DL (ref 70–99)
HCT VFR BLD CALC: 27.8 % (ref 36–48)
HEMOGLOBIN: 8.5 G/DL (ref 12–16)
LYMPHOCYTES ABSOLUTE: 1.2 K/UL (ref 1–5.1)
LYMPHOCYTES RELATIVE PERCENT: 11.8 %
MCH RBC QN AUTO: 32.6 PG (ref 26–34)
MCHC RBC AUTO-ENTMCNC: 30.5 G/DL (ref 31–36)
MCV RBC AUTO: 106.7 FL (ref 80–100)
MONOCYTES ABSOLUTE: 0.7 K/UL (ref 0–1.3)
MONOCYTES RELATIVE PERCENT: 6.3 %
NEUTROPHILS ABSOLUTE: 8.2 K/UL (ref 1.7–7.7)
NEUTROPHILS RELATIVE PERCENT: 78.3 %
PDW BLD-RTO: 15.7 % (ref 12.4–15.4)
PLATELET # BLD: 323 K/UL (ref 135–450)
PMV BLD AUTO: 8 FL (ref 5–10.5)
POTASSIUM REFLEX MAGNESIUM: 5.6 MMOL/L (ref 3.5–5.1)
RBC # BLD: 2.6 M/UL (ref 4–5.2)
SODIUM BLD-SCNC: 127 MMOL/L (ref 136–145)
TOTAL PROTEIN: 7.9 G/DL (ref 6.4–8.2)
WBC # BLD: 10.5 K/UL (ref 4–11)

## 2023-01-29 PROCEDURE — 96361 HYDRATE IV INFUSION ADD-ON: CPT

## 2023-01-29 PROCEDURE — 36415 COLL VENOUS BLD VENIPUNCTURE: CPT

## 2023-01-29 PROCEDURE — 71045 X-RAY EXAM CHEST 1 VIEW: CPT

## 2023-01-29 PROCEDURE — 93010 ELECTROCARDIOGRAM REPORT: CPT | Performed by: INTERNAL MEDICINE

## 2023-01-29 PROCEDURE — 93005 ELECTROCARDIOGRAM TRACING: CPT | Performed by: EMERGENCY MEDICINE

## 2023-01-29 PROCEDURE — 6370000000 HC RX 637 (ALT 250 FOR IP): Performed by: EMERGENCY MEDICINE

## 2023-01-29 PROCEDURE — 96360 HYDRATION IV INFUSION INIT: CPT

## 2023-01-29 PROCEDURE — 99285 EMERGENCY DEPT VISIT HI MDM: CPT

## 2023-01-29 PROCEDURE — 85025 COMPLETE CBC W/AUTO DIFF WBC: CPT

## 2023-01-29 PROCEDURE — 80053 COMPREHEN METABOLIC PANEL: CPT

## 2023-01-29 PROCEDURE — 2580000003 HC RX 258: Performed by: EMERGENCY MEDICINE

## 2023-01-29 RX ORDER — 0.9 % SODIUM CHLORIDE 0.9 %
500 INTRAVENOUS SOLUTION INTRAVENOUS ONCE
Status: COMPLETED | OUTPATIENT
Start: 2023-01-29 | End: 2023-01-29

## 2023-01-29 RX ORDER — MIDODRINE HYDROCHLORIDE 5 MG/1
5 TABLET ORAL
Status: DISCONTINUED | OUTPATIENT
Start: 2023-01-29 | End: 2023-01-29 | Stop reason: HOSPADM

## 2023-01-29 RX ADMIN — MIDODRINE HYDROCHLORIDE 5 MG: 5 TABLET ORAL at 13:01

## 2023-01-29 RX ADMIN — SODIUM CHLORIDE 500 ML: 9 INJECTION, SOLUTION INTRAVENOUS at 14:02

## 2023-01-29 ASSESSMENT — ENCOUNTER SYMPTOMS
SHORTNESS OF BREATH: 0
DIARRHEA: 0
VOMITING: 0
CHEST TIGHTNESS: 0
COUGH: 0
BACK PAIN: 0
ABDOMINAL PAIN: 0
RHINORRHEA: 0

## 2023-01-29 ASSESSMENT — PULMONARY FUNCTION TESTS
PIF_VALUE: 22
PIF_VALUE: 21

## 2023-01-29 ASSESSMENT — PAIN - FUNCTIONAL ASSESSMENT: PAIN_FUNCTIONAL_ASSESSMENT: NONE - DENIES PAIN

## 2023-01-29 NOTE — PROGRESS NOTES
01/29/23 1141   Patient Observation   SpO2 95 %   Observations Marizol #[id number] distal   Vent Information   Vent Mode AC/VC   Ventilator Settings   FiO2  40 %   Vt (Set, mL) 300 mL   Resp Rate (Set) 18 bmp   PEEP/CPAP (cmH2O) 5   Vent Patient Data (Readings)   Vt (Measured) 323 mL   Peak Inspiratory Pressure (cmH2O) 22 cmH2O   Rate Measured 19 br/min   Minute Volume (L/min) 5.81 Liters   Peak Inspiratory Flow (lpm) 50 L/sec   Mean Airway Pressure (cmH2O) 8.6 cmH20   I:E Ratio 1:3.6     Patient was placed on her home ventilator settings.

## 2023-01-29 NOTE — DISCHARGE INSTRUCTIONS
Slight hyponatremia to 127 here in the emergency department, given IV fluids. Blood pressure was slightly soft on arrival, however she is alert and oriented the whole time. Given 500 cc fluid bolus, she had some significantly dry mucous membranes, and she greatly improved. Tolerating p.o. Was watched for almost 4 hours, and was never somnolent or unresponsive. Follow-up with PCP soon as possible.

## 2023-01-29 NOTE — ED PROVIDER NOTES
Emergency Department Attending Physician Note  Location: [place] [address] ED  1/29/2023       Pt Name: Berenice Moses  MRN: [id number]  [place] 1950    Date of evaluation: 1/29/2023  Provider: Dave Shea DO  PCP: Kevin Ramon MD    Note Started: 12:58 PM EST 1/29/23    CHIEF COMPLAINT  Chief Complaint   Patient presents with    Hypotension     Patient from [facility], reported hard to respond and and low B/P. Patient alert and responding on arrival, BP 94/56       HISTORY OF PRESENT ILLNESS:  History obtained by patient. Limitations to history : None. Berenice Moses is a 71 y.o. female with a significant PMHx of renal disease on dialysis, tracheostomy in place, currently on the vent status post admission for aspiration pneumonia, recently completed vancomycin and cefepime yesterday, got dialysis yesterday, and other comorbidities as listed below, presented emergency department today nursing home with concerns of low blood pressure and unresponsiveness at the nursing home this morning. On arrival to emergency department, patient is alert, looking around the room, and interacting with us, smiling and answering questions as she would baseline. EMS states that they got there and she was sleeping, and when they transferred her over to the bed, she woke right up. For them, blood pressure has been low, and on arrival to the emergency department, for over 56. On chart review, baseline and multiple readings tend to be in the 204X systolic. Otherwise, patient denies any pain. She is alert, interactive, and answering questions. No significant respiratory distress, is currently vented because of the aspiration pneumonia, but that is not new today. Otherwise, no falls. Patient denies any nausea or vomiting. Denies any abdominal pain. Nursing Notes were all reviewed and agreed with or any disagreements were addressed in the HPI.     MEDICAL HISTORY  Past Medical History:   Diagnosis Date    Acute and chronic respiratory failure (acute-on-chronic) ([facility])     Acute blood loss anemia 7/1/2020    Acute deep vein thrombosis (DVT) of brachial vein of left upper extremity ([address] 2/20/2019    Formatting of this note might be different from the original. Dx February 2019    Anxiety disorder     Atherosclerotic heart disease of native coronary artery without angina pectoris     Bleeding hemorrhoids 6/29/2020    Cerebellar infarct ([facility] 9/29/2019    Cerebral infarction ([facility])     Chronic pain syndrome     Dependence on renal dialysis ([address]     Dependence on respirator ([facility])     End stage renal disease ([address]     Gastro-esophageal reflux disease with esophagitis, without bleeding     Insomnia     Major depressive disorder, recurrent ([address]     Morbid obesity due to excess calories ([facility])     Muscle weakness     Obstructive sleep apnea (adult) (pediatric)     Other hyperlipidemia     Other voice and resonance disorders     Personal history of COVID-19     Pulmonary hypertension ([address]     Tracheostomy status ([address]     Type 2 diabetes mellitus without complications ([address]     Unspecified atrial fibrillation ([address]         SURGICAL HISTORY  Past Surgical History:   Procedure Laterality Date    COLECTOMY N/A 5/10/2022    EXPLORATORY LAPAROTOMY, LYSIS OF ADHESIONS performed by Ernesto Durham MD at [address] [place] [address]  Previous Medications    ACETAMINOPHEN (TYLENOL) 325 MG TABLET    Take 650 mg by mouth every 6 hours as needed for Pain    APIXABAN (ELIQUIS) 2.5 MG TABS TABLET    Take 2.5 mg by mouth 2 times daily    ATORVASTATIN (LIPITOR) 40 MG TABLET    Take 1 tablet by mouth nightly    BISACODYL RE    Place rectally daily as needed    CALCIUM ACETATE 667 MG TABS    Take 3 capsules by mouth 3 times daily (with meals)    CARBOXYMETHYLCELLULOSE PF (THERATEARS) 1 % GEL OPHTHALMIC GEL    Place 1 drop into both eyes every 4 hours as needed for Dry Eyes    CHLORHEXIDINE (PERIDEX) 0.12 % SOLUTION    Take 15 mLs by mouth 2 times daily    DIPHENHYDRAMINE (BENADRYL) 25 MG CAPSULE    Take 25 mg by mouth every 8 hours as needed     GABAPENTIN (NEURONTIN) 300 MG CAPSULE    Take 300 mg by mouth 3 times daily. HYDROCORTISONE (ANUSOL-HC) 2.5 % CREA RECTAL CREAM    Place rectally 2 times daily    HYDROCORTISONE 1 % CREAM    Apply topically 2 times daily Apply topically 2 times daily. IPRATROPIUM-ALBUTEROL (DUONEB) 0.5-2.5 (3) MG/3ML SOLN NEBULIZER SOLUTION    Inhale 1 vial into the lungs every 4 hours    LOPERAMIDE (IMODIUM) 2 MG CAPSULE    Take 2 mg by mouth 4 times daily as needed for Diarrhea    MAGNESIUM HYDROXIDE (DULCOLAX PO)    Take 10 mg by mouth daily as needed    METOPROLOL TARTRATE (LOPRESSOR) 25 MG TABLET    Take 1 tablet by mouth 2 times daily Hold for SBP less than 110    MIDODRINE (PROAMATINE) 5 MG TABLET    Take 10 mg by mouth three times a week Tues thurs sat pre dialysis    OMEPRAZOLE (PRILOSEC) 20 MG DELAYED RELEASE CAPSULE    Take 20 mg by mouth daily    POLYETHYLENE GLYCOL (GLYCOLAX) 17 G PACKET    Take 17 g by mouth daily as needed for Constipation    PROMETHAZINE (PHENERGAN) 12.5 MG TABLET    Take 12.5 mg by mouth every 6 hours as needed for Nausea    SENNOSIDES-DOCUSATE SODIUM (SENOKOT-S) 8.6-50 MG TABLET    Take 2 tablets by mouth at bedtime    SERTRALINE (ZOLOFT) 50 MG TABLET    Take 50 mg by mouth daily    SODIUM PHOSPHATE (FLEET) 7-19 GM/118ML    Place 1 enema rectally once as needed    TRAZODONE (DESYREL) 50 MG TABLET    Take 50 mg by mouth nightly    UNABLE TO FIND    Veltessa 8.4 gm by mouth one time a day every Monday, Wednesday, Friday, Sunday for hyperkalemia    VITAMIN B-12 (CYANOCOBALAMIN) 500 MCG TABLET    Take 500 mcg by mouth daily       ALLERGIES  Haloperidol lactate and Ziprasidone hcl    FAMILYHISTORY  History reviewed. No pertinent family history.     SOCIAL HISTORY  Social History     Tobacco Use    Smoking status: Former    Smokeless tobacco: Never   Substance Use Topics Alcohol use: Never    Drug use: Never       SCREENINGS    Pau Coma Scale  Eye Opening: Spontaneous  Best Verbal Response: Oriented  Best Motor Response: Obeys commands  Pau Coma Scale Score: 15       CIWA Assessment  BP: (!) 110/57  Heart Rate: 78             REVIEW OF SYSTEMS:    Review of Systems   Unable to perform ROS: Patient nonverbal   Constitutional:  Negative for activity change, appetite change, fatigue and fever. HENT:  Negative for congestion and rhinorrhea. Respiratory:  Negative for cough, chest tightness and shortness of breath. Cardiovascular:  Negative for chest pain and leg swelling. Gastrointestinal:  Negative for abdominal pain, diarrhea and vomiting. Musculoskeletal:  Negative for back pain and neck pain. Positives and Pertinent negatives as per HPI. PHYSICAL EXAM:     Vitals:    01/29/23 1304 01/29/23 1352 01/29/23 1444 01/29/23 1449   BP: (!) 88/59 (!) 88/42 110/87 (!) 110/57   Pulse: 70 69  78   Resp: 16 11  21   Temp:       TempSrc:       SpO2: 93% 95%  100%       Physical Exam  Vitals reviewed. Constitutional:       General: She is not in acute distress. Appearance: Normal appearance. She is not ill-appearing. HENT:      Head: Normocephalic and atraumatic. Right Ear: External ear normal.      Left Ear: External ear normal.      Nose: Nose normal. No congestion. Mouth/Throat:      Mouth: Mucous membranes are dry. Pharynx: Oropharynx is clear. Comments: Dry mucous membranes, dry nares. Crusting to the skin. Eyes:      General:         Right eye: No discharge. Left eye: No discharge. Conjunctiva/sclera: Conjunctivae normal.   Cardiovascular:      Rate and Rhythm: Normal rate and regular rhythm. Pulmonary:      Effort: Pulmonary effort is normal. No respiratory distress. Breath sounds: Normal breath sounds. No wheezing. Comments: Diminished bilaterally, but quite clear on the vent.   Abdominal:      General: Abdomen is flat. There is no distension. Palpations: Abdomen is soft. Tenderness: There is no abdominal tenderness. Musculoskeletal:         General: No swelling or tenderness. Cervical back: Normal range of motion and neck supple. Right lower leg: No edema. Left lower leg: No edema. Skin:     General: Skin is warm and dry. Neurological:      General: No focal deficit present. Mental Status: She is alert and oriented to person, place, and time. Sensory: No sensory deficit. Motor: No weakness. Comments: Patient chronically weak, is vent dependent, morbidly obese, however follows commands in all 4 extremities. No sensation changes. Has strength in the bilateral upper and lower extremities. Psychiatric:         Mood and Affect: Mood normal.         Behavior: Behavior normal.         DIAGNOSTIC RESULTS:  LABS:    Labs Reviewed   CBC WITH AUTO DIFFERENTIAL - Abnormal; Notable for the following components:       Result Value    RBC 2.60 (*)     Hemoglobin 8.5 (*)     Hematocrit 27.8 (*)     .7 (*)     MCHC 30.5 (*)     RDW 15.7 (*)     Neutrophils Absolute 8.2 (*)     All other components within normal limits   COMPREHENSIVE METABOLIC PANEL W/ REFLEX TO MG FOR LOW K - Abnormal; Notable for the following components:    Sodium 127 (*)     Potassium reflex Magnesium 5.6 (*)     Chloride 92 (*)     Glucose 125 (*)     BUN 43 (*)     Creatinine 6.2 (*)     Est, Glom Filt Rate 7 (*)     Albumin 2.7 (*)     Albumin/Globulin Ratio 0.5 (*)     ALT 9 (*)     All other components within normal limits    Narrative:     CALL  [place]  [facility] tel. [phone],  Chemistry results called to and read back by Bob Arias RN, 01/29/2023  12:49, by [facility]       When ordered, only abnormal lab results are displayed. All other labs were within normal range or not returned as of this dictation.       EKG obtained today in the emergency department shows a sinus rhythm with a first-degree AV block. Low voltage QRS. No ST segment elevation, ST segment depression, hyperacute T waves. RADIOLOGY:      Non-plain film images such as CT, Ultrasound and MRI are read by the radiologist. Interpretation per the Radiologist below, if available at the time of this note:  XR CHEST PORTABLE   Final Result   Bibasilar atelectasis versus pneumonia with developing right parapneumonic   effusion. PROCEDURES:  Unless otherwise noted below, none. Procedures      MEDICATIONS:   Medications   midodrine (PROAMATINE) tablet 5 mg (5 mg Oral Given 1/29/23 1301)   0.9 % sodium chloride bolus (500 mLs IntraVENous New Bag 1/29/23 1402)     _____________________________________    MEDICAL DECISION MAKING:     Patient is a 71 y.o. female with a significant PMHx of renal disease, recent antibiotic completion yesterday for aspiration pneumonia, is back on the vent, completed dialysis yesterday, presenting emergency department today with what sounds like difficulty to arouse earlier this morning and some softer blood pressures. On arrival emergency department, patient is sitting up in bed, alert, looking around the room. She is laughing and responding to staff. Blood pressure is slightly soft, has some significantly dry mucous membranes, but otherwise is in no acute clinical cardiopulmonary distress. Laboratory evaluation today reveals no new significant anemia, hemoglobin 8.5 on this patient who is end-stage renal disease on dialysis. Sodium is 127, usually 130 or so. Creatinine 6.2, in the setting of end-stage renal disease, not abnormal.  Potassium 5.6 but hemolyzed, and likely to be much lower. EKG is unremarkable. Chest x-ray with bibasilar atelectasis versus pneumonia, developing right effusion. Patient is not significantly short of breath, not hypoxic, and her vent settings are baseline. 12:58 PM EST  While sleeping, patient's blood pressure dips down to 94/56.   Is in no acute distress, arousable, and answers questions appropriately. We will give a dose of midodrine and 500 cc fluid bolus. On chart review, when she had been discharged last from this hospital, she had baseline blood pressures of the 110s, and today in the emergency department she is in the low 90s. Her mouth is quite dry, I do believe that could be a hypovolemia component to this. 2:48 PM EST  After slow IV hydration and midodrine, blood pressure is 110/87. Patient is alert, talking, and has been conversational throughout her ED stay. She is asking for something to eat. We will continue to monitor for a little bit longer today in the emergency department for discharge home. Patient says she feels completely fine still in the ED. I do believe that this patient is chronically ill, I do not feel that she would benefit from any significant intervention at this time as she recently completed a course of outpatient IV antibiotics yesterday, has no leukocytosis. Further, while she does have a small pleural effusion, she is a dialysis patient, and I believe this could be related to the recent antibiotic she got. She is currently back on the vent, and nursing home was able to tolerate this. Additionally, electrolytes, renal function at baseline. Is going to get dialysis tomorrow. No significant EKG changes. Charged back to the nursing home in stable condition, has tolerated p.o., and states she feels comfortable going back. Is this patient to be included in the SEP-1 Core Measure due to severe sepsis or septic shock? No   Exclusion criteria - the patient is NOT to be included for SEP-1 Core Measure due to: Infection is not suspected      CLINICAL IMPRESSION:     ICD-10-CM    1. Dehydration  E86.0       2. ESRD (end stage renal disease) ([facility]  N18.6       3. Hyponatremia  E87.1       4.  Ventilator dependence ([facility] [address]  Z99.11             DISPOSITION:  I discussed the results, including any incidental findings, with patient and through shared decision making. Disposition today from the ED will be: Discharge to nursing home in fair condition. Questions answered. They are agreeable to plan and express understanding of plan. Social Determinants : multiple chronic conditions, heavy medical burden      CRITICAL CARE:   Total critical care time is 43 minutes, which excludes separately billable procedures and updating family. Time spent is specifically for management of the presenting complaint and symptoms initially, direct bedside care, reevaluation, review of records, and consultation. There was a high probability of clinically significant life-threatening deterioration in the patient's condition, which required my urgent intervention. DISCHARGE MEDICATIONS (if applicable):  New Prescriptions    No medications on file       DISCONTINUED MEDICATIONS:  Discontinued Medications    No medications on file            DISPOSITION REFERRAL (if applicable):  [facility] ED  [address] [facility]  [phone]    If symptoms worsen, As needed    Alma Rosa Shaikh MD  [address] [place], [place] [address]  [address] [place] [address] [address]    Schedule an appointment as soon as possible for a visit       _____________________________________      Glo Martinez DO, ([facility]) am the primary attending of record and contributed the majority of evaluation and treatment of emergent care for this encounter. This chart was generated in part by using Dragon Dictation system and may contain errors related to that system including errors in grammar, punctuation, and spelling, as well as words and phrases that may be inappropriate. If there are any questions or concerns please feel free to contact the dictating provider for clarification.      LYDIA MARTINEZ DO (electronically signed)   [address] [facility] [address], DO  01/29/23 8425

## 2023-01-29 NOTE — ED NOTES
Attempted to call reports to [facility] with no answer.       Jordan Gar RN  01/29/23 9137 Speaking Coherently

## 2023-01-29 NOTE — ED NOTES
1515-Notified by Charge RN Crystal patients needs transport set up back to [facility].   1520-Call placed to [facility] patient transport stated they can not accommodate transport. 1535-Call placed to [facility] booked transport with eta of 45 minutes.      Marleen Joyce  01/29/23 1548

## 2023-02-25 ENCOUNTER — APPOINTMENT (OUTPATIENT)
Dept: GENERAL RADIOLOGY | Age: 73
DRG: 640 | End: 2023-02-25
Payer: COMMERCIAL

## 2023-02-25 ENCOUNTER — HOSPITAL ENCOUNTER (INPATIENT)
Age: 73
LOS: 2 days | Discharge: NURSING, REHAB OR LONG-TERM CARE | DRG: 640 | End: 2023-02-28
Attending: STUDENT IN AN ORGANIZED HEALTH CARE EDUCATION/TRAINING PROGRAM | Admitting: INTERNAL MEDICINE
Payer: COMMERCIAL

## 2023-02-25 DIAGNOSIS — M19.90 OSTEOARTHRITIS, UNSPECIFIED OSTEOARTHRITIS TYPE, UNSPECIFIED SITE: ICD-10-CM

## 2023-02-25 DIAGNOSIS — Z99.2 DIALYSIS PATIENT (HCC): ICD-10-CM

## 2023-02-25 DIAGNOSIS — E87.5 HYPERKALEMIA: Primary | ICD-10-CM

## 2023-02-25 DIAGNOSIS — F41.1 GAD (GENERALIZED ANXIETY DISORDER): ICD-10-CM

## 2023-02-25 DIAGNOSIS — N18.6 ESRD (END STAGE RENAL DISEASE) (HCC): ICD-10-CM

## 2023-02-25 LAB
A/G RATIO: 0.6 (ref 1.1–2.2)
ALBUMIN SERPL-MCNC: 3.4 G/DL (ref 3.4–5)
ALP BLD-CCNC: 131 U/L (ref 40–129)
ALT SERPL-CCNC: 6 U/L (ref 10–40)
ANION GAP SERPL CALCULATED.3IONS-SCNC: 13 MMOL/L (ref 3–16)
APTT: 32.5 SEC (ref 23–34.3)
AST SERPL-CCNC: 12 U/L (ref 15–37)
BASOPHILS ABSOLUTE: 0.1 K/UL (ref 0–0.2)
BASOPHILS RELATIVE PERCENT: 1.5 %
BILIRUB SERPL-MCNC: 0.4 MG/DL (ref 0–1)
BUN BLDV-MCNC: 78 MG/DL (ref 7–20)
CALCIUM SERPL-MCNC: 9.6 MG/DL (ref 8.3–10.6)
CHLORIDE BLD-SCNC: 95 MMOL/L (ref 99–110)
CO2: 28 MMOL/L (ref 21–32)
CREAT SERPL-MCNC: 7 MG/DL (ref 0.6–1.2)
EOSINOPHILS ABSOLUTE: 0.8 K/UL (ref 0–0.6)
EOSINOPHILS RELATIVE PERCENT: 8.6 %
GFR SERPL CREATININE-BSD FRML MDRD: 6 ML/MIN/{1.73_M2}
GLUCOSE BLD-MCNC: 168 MG/DL (ref 70–99)
HCT VFR BLD CALC: 25.5 % (ref 36–48)
HEMOGLOBIN: 8 G/DL (ref 12–16)
INFLUENZA A: NOT DETECTED
INFLUENZA B: NOT DETECTED
INR BLD: 1.16 (ref 0.87–1.14)
LYMPHOCYTES ABSOLUTE: 1.1 K/UL (ref 1–5.1)
LYMPHOCYTES RELATIVE PERCENT: 12.4 %
MAGNESIUM: 1.7 MG/DL (ref 1.8–2.4)
MCH RBC QN AUTO: 31.9 PG (ref 26–34)
MCHC RBC AUTO-ENTMCNC: 31.2 G/DL (ref 31–36)
MCV RBC AUTO: 102.5 FL (ref 80–100)
MONOCYTES ABSOLUTE: 0.4 K/UL (ref 0–1.3)
MONOCYTES RELATIVE PERCENT: 4.7 %
NEUTROPHILS ABSOLUTE: 6.5 K/UL (ref 1.7–7.7)
NEUTROPHILS RELATIVE PERCENT: 72.8 %
PDW BLD-RTO: 14.6 % (ref 12.4–15.4)
PLATELET # BLD: 215 K/UL (ref 135–450)
PMV BLD AUTO: 8.7 FL (ref 5–10.5)
POTASSIUM SERPL-SCNC: 5.9 MMOL/L (ref 3.5–5.1)
POTASSIUM SERPL-SCNC: 6.4 MMOL/L (ref 3.5–5.1)
POTASSIUM SERPL-SCNC: 6.4 MMOL/L (ref 3.5–5.1)
PROTHROMBIN TIME: 14.6 SEC (ref 11.7–14.5)
RBC # BLD: 2.49 M/UL (ref 4–5.2)
SARS-COV-2 RNA, RT PCR: NOT DETECTED
SODIUM BLD-SCNC: 136 MMOL/L (ref 136–145)
TOTAL PROTEIN: 8.8 G/DL (ref 6.4–8.2)
TROPONIN: 0.18 NG/ML
WBC # BLD: 9 K/UL (ref 4–11)

## 2023-02-25 PROCEDURE — 83735 ASSAY OF MAGNESIUM: CPT

## 2023-02-25 PROCEDURE — 87636 SARSCOV2 & INF A&B AMP PRB: CPT

## 2023-02-25 PROCEDURE — 84132 ASSAY OF SERUM POTASSIUM: CPT

## 2023-02-25 PROCEDURE — 5A1945Z RESPIRATORY VENTILATION, 24-96 CONSECUTIVE HOURS: ICD-10-PCS | Performed by: INTERNAL MEDICINE

## 2023-02-25 PROCEDURE — 96375 TX/PRO/DX INJ NEW DRUG ADDON: CPT

## 2023-02-25 PROCEDURE — 94761 N-INVAS EAR/PLS OXIMETRY MLT: CPT

## 2023-02-25 PROCEDURE — 80053 COMPREHEN METABOLIC PANEL: CPT

## 2023-02-25 PROCEDURE — 6360000002 HC RX W HCPCS: Performed by: STUDENT IN AN ORGANIZED HEALTH CARE EDUCATION/TRAINING PROGRAM

## 2023-02-25 PROCEDURE — 36415 COLL VENOUS BLD VENIPUNCTURE: CPT

## 2023-02-25 PROCEDURE — 93005 ELECTROCARDIOGRAM TRACING: CPT | Performed by: STUDENT IN AN ORGANIZED HEALTH CARE EDUCATION/TRAINING PROGRAM

## 2023-02-25 PROCEDURE — 2700000000 HC OXYGEN THERAPY PER DAY

## 2023-02-25 PROCEDURE — 94002 VENT MGMT INPAT INIT DAY: CPT

## 2023-02-25 PROCEDURE — 6370000000 HC RX 637 (ALT 250 FOR IP): Performed by: STUDENT IN AN ORGANIZED HEALTH CARE EDUCATION/TRAINING PROGRAM

## 2023-02-25 PROCEDURE — 2500000003 HC RX 250 WO HCPCS: Performed by: STUDENT IN AN ORGANIZED HEALTH CARE EDUCATION/TRAINING PROGRAM

## 2023-02-25 PROCEDURE — 85025 COMPLETE CBC W/AUTO DIFF WBC: CPT

## 2023-02-25 PROCEDURE — 71045 X-RAY EXAM CHEST 1 VIEW: CPT

## 2023-02-25 PROCEDURE — 99285 EMERGENCY DEPT VISIT HI MDM: CPT

## 2023-02-25 PROCEDURE — 96374 THER/PROPH/DIAG INJ IV PUSH: CPT

## 2023-02-25 PROCEDURE — 84484 ASSAY OF TROPONIN QUANT: CPT

## 2023-02-25 PROCEDURE — 85610 PROTHROMBIN TIME: CPT

## 2023-02-25 PROCEDURE — 85730 THROMBOPLASTIN TIME PARTIAL: CPT

## 2023-02-25 PROCEDURE — 2580000003 HC RX 258: Performed by: STUDENT IN AN ORGANIZED HEALTH CARE EDUCATION/TRAINING PROGRAM

## 2023-02-25 RX ORDER — DEXTROSE MONOHYDRATE 100 MG/ML
INJECTION, SOLUTION INTRAVENOUS CONTINUOUS PRN
Status: DISCONTINUED | OUTPATIENT
Start: 2023-02-25 | End: 2023-02-26 | Stop reason: SDUPTHER

## 2023-02-25 RX ORDER — CALCIUM GLUCONATE 94 MG/ML
1000 INJECTION, SOLUTION INTRAVENOUS ONCE
Status: COMPLETED | OUTPATIENT
Start: 2023-02-25 | End: 2023-02-25

## 2023-02-25 RX ORDER — SODIUM CHLORIDE 9 MG/ML
25 INJECTION, SOLUTION INTRAVENOUS PRN
Status: DISCONTINUED | OUTPATIENT
Start: 2023-02-25 | End: 2023-02-26 | Stop reason: SDUPTHER

## 2023-02-25 RX ORDER — LIDOCAINE HYDROCHLORIDE 10 MG/ML
5 INJECTION, SOLUTION INFILTRATION; PERINEURAL ONCE
Status: DISCONTINUED | OUTPATIENT
Start: 2023-02-25 | End: 2023-02-28 | Stop reason: HOSPADM

## 2023-02-25 RX ORDER — SODIUM CHLORIDE 0.9 % (FLUSH) 0.9 %
5-40 SYRINGE (ML) INJECTION PRN
Status: DISCONTINUED | OUTPATIENT
Start: 2023-02-25 | End: 2023-02-28 | Stop reason: HOSPADM

## 2023-02-25 RX ORDER — SODIUM CHLORIDE 0.9 % (FLUSH) 0.9 %
5-40 SYRINGE (ML) INJECTION EVERY 12 HOURS SCHEDULED
Status: DISCONTINUED | OUTPATIENT
Start: 2023-02-25 | End: 2023-02-28 | Stop reason: HOSPADM

## 2023-02-25 RX ADMIN — CALCIUM GLUCONATE 1000 MG: 98 INJECTION, SOLUTION INTRAVENOUS at 17:50

## 2023-02-25 RX ADMIN — DEXTROSE MONOHYDRATE 250 ML: 100 INJECTION, SOLUTION INTRAVENOUS at 17:53

## 2023-02-25 RX ADMIN — SODIUM ZIRCONIUM CYCLOSILICATE 10 G: 10 POWDER, FOR SUSPENSION ORAL at 17:51

## 2023-02-25 RX ADMIN — SODIUM BICARBONATE 50 MEQ: 84 INJECTION, SOLUTION INTRAVENOUS at 18:01

## 2023-02-25 RX ADMIN — INSULIN HUMAN 10 UNITS: 100 INJECTION, SOLUTION PARENTERAL at 17:51

## 2023-02-25 ASSESSMENT — PULMONARY FUNCTION TESTS
PIF_VALUE: 25
PIF_VALUE: 33

## 2023-02-25 ASSESSMENT — PAIN - FUNCTIONAL ASSESSMENT: PAIN_FUNCTIONAL_ASSESSMENT: 0-10

## 2023-02-25 ASSESSMENT — PAIN SCALES - GENERAL: PAINLEVEL_OUTOF10: 0

## 2023-02-25 NOTE — ED NOTES
1630 Perfect serve sent to Dr. Alley David  02/25/23 [phone] consult completed with call back from Dr. Dale Montenegro speaking with Dr. Jack Fuentes  02/25/23 9444

## 2023-02-25 NOTE — ED NOTES
[phone] call placed to Nephrology      Supriya Goss  02/25/23 [phone] consult completed with call back from Dr. Anup Zelaya speaking with DR. Therese Chang  02/25/23 6408

## 2023-02-25 NOTE — PROGRESS NOTES
02/25/23 1502   NICU Vent Information   $Ventilation $Initial Day   Ventilation Started Yes   Skin Assessment Clean, dry, & intact   Vent Type 840   Vent Mode AC/VC   Vt (Set, mL) 300 mL   Vt (Measured) 550 mL   Resp Rate (Set) 18 bmp   Rate Measured 24 br/min   Minute Volume (L/min) 10.2 Liters   Peak Flow 60 L/min   FiO2  40 %   Peak Inspiratory Pressure (cmH2O) 25 cmH2O   I:E Ratio 1:3   Sensitivity 3   PEEP/CPAP (cmH2O) 5   Mean Airway Pressure (cmH2O) 11 cmH20   Cough/Sputum   Sputum How Obtained Suctioned;Tracheal   Sputum Amount Large   Sputum Color Green   Tenacity Thick   Additional Respiratory Assessments   Heart Rate 80   Resp 24   SpO2 100 %

## 2023-02-26 ENCOUNTER — APPOINTMENT (OUTPATIENT)
Dept: GENERAL RADIOLOGY | Age: 73
DRG: 640 | End: 2023-02-26
Payer: COMMERCIAL

## 2023-02-26 LAB
ANION GAP SERPL CALCULATED.3IONS-SCNC: 13 MMOL/L (ref 3–16)
ANION GAP SERPL CALCULATED.3IONS-SCNC: 15 MMOL/L (ref 3–16)
ANION GAP SERPL CALCULATED.3IONS-SCNC: 16 MMOL/L (ref 3–16)
BASOPHILS ABSOLUTE: 0.1 K/UL (ref 0–0.2)
BASOPHILS RELATIVE PERCENT: 0.8 %
BUN BLDV-MCNC: 75 MG/DL (ref 7–20)
BUN BLDV-MCNC: 77 MG/DL (ref 7–20)
BUN BLDV-MCNC: 78 MG/DL (ref 7–20)
CALCIUM SERPL-MCNC: 8.9 MG/DL (ref 8.3–10.6)
CALCIUM SERPL-MCNC: 9.1 MG/DL (ref 8.3–10.6)
CALCIUM SERPL-MCNC: 9.1 MG/DL (ref 8.3–10.6)
CHLORIDE BLD-SCNC: 95 MMOL/L (ref 99–110)
CHLORIDE BLD-SCNC: 96 MMOL/L (ref 99–110)
CHLORIDE BLD-SCNC: 99 MMOL/L (ref 99–110)
CO2: 19 MMOL/L (ref 21–32)
CO2: 20 MMOL/L (ref 21–32)
CO2: 26 MMOL/L (ref 21–32)
CREAT SERPL-MCNC: 6.3 MG/DL (ref 0.6–1.2)
CREAT SERPL-MCNC: 6.6 MG/DL (ref 0.6–1.2)
CREAT SERPL-MCNC: 6.7 MG/DL (ref 0.6–1.2)
EKG ATRIAL RATE: 65 BPM
EKG ATRIAL RATE: 67 BPM
EKG DIAGNOSIS: NORMAL
EKG DIAGNOSIS: NORMAL
EKG P AXIS: 46 DEGREES
EKG P AXIS: 47 DEGREES
EKG P-R INTERVAL: 250 MS
EKG P-R INTERVAL: 256 MS
EKG Q-T INTERVAL: 394 MS
EKG Q-T INTERVAL: 398 MS
EKG QRS DURATION: 82 MS
EKG QRS DURATION: 84 MS
EKG QTC CALCULATION (BAZETT): 409 MS
EKG QTC CALCULATION (BAZETT): 420 MS
EKG R AXIS: -10 DEGREES
EKG R AXIS: -5 DEGREES
EKG T AXIS: 15 DEGREES
EKG T AXIS: 25 DEGREES
EKG VENTRICULAR RATE: 65 BPM
EKG VENTRICULAR RATE: 67 BPM
EOSINOPHILS ABSOLUTE: 0.6 K/UL (ref 0–0.6)
EOSINOPHILS RELATIVE PERCENT: 8 %
GFR SERPL CREATININE-BSD FRML MDRD: 6 ML/MIN/{1.73_M2}
GFR SERPL CREATININE-BSD FRML MDRD: 6 ML/MIN/{1.73_M2}
GFR SERPL CREATININE-BSD FRML MDRD: 7 ML/MIN/{1.73_M2}
GLUCOSE BLD-MCNC: 108 MG/DL (ref 70–99)
GLUCOSE BLD-MCNC: 121 MG/DL (ref 70–99)
GLUCOSE BLD-MCNC: 125 MG/DL (ref 70–99)
GLUCOSE BLD-MCNC: 68 MG/DL (ref 70–99)
GLUCOSE BLD-MCNC: 78 MG/DL (ref 70–99)
GLUCOSE BLD-MCNC: 80 MG/DL (ref 70–99)
GLUCOSE BLD-MCNC: 92 MG/DL (ref 70–99)
GLUCOSE BLD-MCNC: 92 MG/DL (ref 70–99)
GLUCOSE BLD-MCNC: 99 MG/DL (ref 70–99)
HCT VFR BLD CALC: 24.1 % (ref 36–48)
HEMOGLOBIN: 7.4 G/DL (ref 12–16)
LYMPHOCYTES ABSOLUTE: 1.3 K/UL (ref 1–5.1)
LYMPHOCYTES RELATIVE PERCENT: 17.7 %
MCH RBC QN AUTO: 31.9 PG (ref 26–34)
MCHC RBC AUTO-ENTMCNC: 30.7 G/DL (ref 31–36)
MCV RBC AUTO: 103.7 FL (ref 80–100)
MONOCYTES ABSOLUTE: 0.7 K/UL (ref 0–1.3)
MONOCYTES RELATIVE PERCENT: 8.8 %
NEUTROPHILS ABSOLUTE: 4.9 K/UL (ref 1.7–7.7)
NEUTROPHILS RELATIVE PERCENT: 64.7 %
PDW BLD-RTO: 15.4 % (ref 12.4–15.4)
PERFORMED ON: ABNORMAL
PERFORMED ON: NORMAL
PLATELET # BLD: 158 K/UL (ref 135–450)
PMV BLD AUTO: 8.4 FL (ref 5–10.5)
POTASSIUM REFLEX MAGNESIUM: 5.9 MMOL/L (ref 3.5–5.1)
POTASSIUM SERPL-SCNC: 6.1 MMOL/L (ref 3.5–5.1)
POTASSIUM SERPL-SCNC: 6.4 MMOL/L (ref 3.5–5.1)
POTASSIUM SERPL-SCNC: 6.6 MMOL/L (ref 3.5–5.1)
RBC # BLD: 2.33 M/UL (ref 4–5.2)
SODIUM BLD-SCNC: 131 MMOL/L (ref 136–145)
SODIUM BLD-SCNC: 134 MMOL/L (ref 136–145)
SODIUM BLD-SCNC: 134 MMOL/L (ref 136–145)
WBC # BLD: 7.6 K/UL (ref 4–11)

## 2023-02-26 PROCEDURE — 99223 1ST HOSP IP/OBS HIGH 75: CPT | Performed by: INTERNAL MEDICINE

## 2023-02-26 PROCEDURE — 93005 ELECTROCARDIOGRAM TRACING: CPT | Performed by: STUDENT IN AN ORGANIZED HEALTH CARE EDUCATION/TRAINING PROGRAM

## 2023-02-26 PROCEDURE — 93010 ELECTROCARDIOGRAM REPORT: CPT | Performed by: INTERNAL MEDICINE

## 2023-02-26 PROCEDURE — A4216 STERILE WATER/SALINE, 10 ML: HCPCS | Performed by: INTERNAL MEDICINE

## 2023-02-26 PROCEDURE — 84132 ASSAY OF SERUM POTASSIUM: CPT

## 2023-02-26 PROCEDURE — 71045 X-RAY EXAM CHEST 1 VIEW: CPT

## 2023-02-26 PROCEDURE — 6370000000 HC RX 637 (ALT 250 FOR IP): Performed by: INTERNAL MEDICINE

## 2023-02-26 PROCEDURE — 2580000003 HC RX 258: Performed by: STUDENT IN AN ORGANIZED HEALTH CARE EDUCATION/TRAINING PROGRAM

## 2023-02-26 PROCEDURE — 2500000003 HC RX 250 WO HCPCS: Performed by: INTERNAL MEDICINE

## 2023-02-26 PROCEDURE — 94761 N-INVAS EAR/PLS OXIMETRY MLT: CPT

## 2023-02-26 PROCEDURE — 2700000000 HC OXYGEN THERAPY PER DAY

## 2023-02-26 PROCEDURE — 2580000003 HC RX 258: Performed by: INTERNAL MEDICINE

## 2023-02-26 PROCEDURE — 99233 SBSQ HOSP IP/OBS HIGH 50: CPT | Performed by: INTERNAL MEDICINE

## 2023-02-26 PROCEDURE — 80048 BASIC METABOLIC PNL TOTAL CA: CPT

## 2023-02-26 PROCEDURE — 94003 VENT MGMT INPAT SUBQ DAY: CPT

## 2023-02-26 PROCEDURE — 2060000000 HC ICU INTERMEDIATE R&B

## 2023-02-26 PROCEDURE — 85025 COMPLETE CBC W/AUTO DIFF WBC: CPT

## 2023-02-26 PROCEDURE — 36415 COLL VENOUS BLD VENIPUNCTURE: CPT

## 2023-02-26 RX ORDER — POLYETHYLENE GLYCOL 3350 17 G/17G
17 POWDER, FOR SOLUTION ORAL DAILY PRN
Status: DISCONTINUED | OUTPATIENT
Start: 2023-02-26 | End: 2023-02-28 | Stop reason: HOSPADM

## 2023-02-26 RX ORDER — TRAZODONE HYDROCHLORIDE 50 MG/1
50 TABLET ORAL NIGHTLY
Status: DISCONTINUED | OUTPATIENT
Start: 2023-02-26 | End: 2023-02-28 | Stop reason: HOSPADM

## 2023-02-26 RX ORDER — LORAZEPAM 0.5 MG/1
0.5 TABLET ORAL EVERY 12 HOURS PRN
Status: ON HOLD | COMMUNITY
End: 2023-02-28 | Stop reason: SDUPTHER

## 2023-02-26 RX ORDER — CALCIUM ACETATE 667 MG/1
2001 CAPSULE ORAL
Status: DISCONTINUED | OUTPATIENT
Start: 2023-02-26 | End: 2023-02-28 | Stop reason: HOSPADM

## 2023-02-26 RX ORDER — HYDROCODONE BITARTRATE AND ACETAMINOPHEN 5; 325 MG/1; MG/1
1 TABLET ORAL EVERY 6 HOURS PRN
Status: DISCONTINUED | OUTPATIENT
Start: 2023-02-26 | End: 2023-02-28 | Stop reason: HOSPADM

## 2023-02-26 RX ORDER — OLANZAPINE 10 MG/1
2.5 INJECTION, POWDER, LYOPHILIZED, FOR SOLUTION INTRAMUSCULAR EVERY 6 HOURS PRN
Status: DISCONTINUED | OUTPATIENT
Start: 2023-02-26 | End: 2023-02-28 | Stop reason: HOSPADM

## 2023-02-26 RX ORDER — ACETAMINOPHEN 325 MG/1
650 TABLET ORAL EVERY 6 HOURS PRN
Status: DISCONTINUED | OUTPATIENT
Start: 2023-02-26 | End: 2023-02-28 | Stop reason: HOSPADM

## 2023-02-26 RX ORDER — GABAPENTIN 100 MG/1
100 CAPSULE ORAL 3 TIMES DAILY
Status: DISCONTINUED | OUTPATIENT
Start: 2023-02-26 | End: 2023-02-28 | Stop reason: HOSPADM

## 2023-02-26 RX ORDER — ACETAMINOPHEN 650 MG/1
650 SUPPOSITORY RECTAL EVERY 6 HOURS PRN
Status: DISCONTINUED | OUTPATIENT
Start: 2023-02-26 | End: 2023-02-28 | Stop reason: HOSPADM

## 2023-02-26 RX ORDER — WATER 1000 ML/1000ML
INJECTION, SOLUTION INTRAVENOUS
Status: DISPENSED
Start: 2023-02-26 | End: 2023-02-26

## 2023-02-26 RX ORDER — LORAZEPAM 0.5 MG/1
0.5 TABLET ORAL DAILY
Status: ON HOLD | COMMUNITY
End: 2023-02-28 | Stop reason: HOSPADM

## 2023-02-26 RX ORDER — POLYETHYLENE GLYCOL 3350 17 G/17G
17 POWDER, FOR SOLUTION ORAL DAILY PRN
Status: DISCONTINUED | OUTPATIENT
Start: 2023-02-26 | End: 2023-02-26 | Stop reason: SDUPTHER

## 2023-02-26 RX ORDER — DEXTROSE MONOHYDRATE 100 MG/ML
INJECTION, SOLUTION INTRAVENOUS CONTINUOUS PRN
Status: DISCONTINUED | OUTPATIENT
Start: 2023-02-26 | End: 2023-02-28 | Stop reason: HOSPADM

## 2023-02-26 RX ORDER — ONDANSETRON 4 MG/1
4 TABLET, ORALLY DISINTEGRATING ORAL EVERY 8 HOURS PRN
Status: DISCONTINUED | OUTPATIENT
Start: 2023-02-26 | End: 2023-02-28 | Stop reason: HOSPADM

## 2023-02-26 RX ORDER — ATORVASTATIN CALCIUM 40 MG/1
40 TABLET, FILM COATED ORAL NIGHTLY
Status: DISCONTINUED | OUTPATIENT
Start: 2023-02-26 | End: 2023-02-28 | Stop reason: HOSPADM

## 2023-02-26 RX ORDER — SODIUM CHLORIDE 0.9 % (FLUSH) 0.9 %
5-40 SYRINGE (ML) INJECTION EVERY 12 HOURS SCHEDULED
Status: DISCONTINUED | OUTPATIENT
Start: 2023-02-26 | End: 2023-02-26 | Stop reason: SDUPTHER

## 2023-02-26 RX ORDER — MIDODRINE HYDROCHLORIDE 10 MG/1
10 TABLET ORAL
Status: DISCONTINUED | OUTPATIENT
Start: 2023-02-26 | End: 2023-02-28 | Stop reason: HOSPADM

## 2023-02-26 RX ORDER — OXYCODONE HYDROCHLORIDE 5 MG/1
5 TABLET ORAL 2 TIMES DAILY
Status: ON HOLD | COMMUNITY
End: 2023-02-28 | Stop reason: SDUPTHER

## 2023-02-26 RX ORDER — CALCIUM GLUCONATE 94 MG/ML
1000 INJECTION, SOLUTION INTRAVENOUS ONCE
Status: COMPLETED | OUTPATIENT
Start: 2023-02-27 | End: 2023-02-27

## 2023-02-26 RX ORDER — SODIUM CHLORIDE 9 MG/ML
INJECTION, SOLUTION INTRAVENOUS PRN
Status: DISCONTINUED | OUTPATIENT
Start: 2023-02-26 | End: 2023-02-28 | Stop reason: HOSPADM

## 2023-02-26 RX ORDER — SODIUM CHLORIDE 0.9 % (FLUSH) 0.9 %
5-40 SYRINGE (ML) INJECTION PRN
Status: DISCONTINUED | OUTPATIENT
Start: 2023-02-26 | End: 2023-02-26 | Stop reason: SDUPTHER

## 2023-02-26 RX ORDER — ONDANSETRON 2 MG/ML
4 INJECTION INTRAMUSCULAR; INTRAVENOUS EVERY 6 HOURS PRN
Status: DISCONTINUED | OUTPATIENT
Start: 2023-02-26 | End: 2023-02-28 | Stop reason: HOSPADM

## 2023-02-26 RX ADMIN — SODIUM CHLORIDE, PRESERVATIVE FREE 10 ML: 5 INJECTION INTRAVENOUS at 09:09

## 2023-02-26 RX ADMIN — POLYETHYLENE GLYCOL (3350) 17 G: 17 POWDER, FOR SOLUTION ORAL at 18:07

## 2023-02-26 RX ADMIN — DEXTROSE MONOHYDRATE 250 ML: 100 INJECTION, SOLUTION INTRAVENOUS at 04:33

## 2023-02-26 RX ADMIN — SODIUM ZIRCONIUM CYCLOSILICATE 10 G: 10 POWDER, FOR SUSPENSION ORAL at 00:33

## 2023-02-26 RX ADMIN — SERTRALINE HYDROCHLORIDE 50 MG: 50 TABLET ORAL at 09:08

## 2023-02-26 RX ADMIN — MUPIROCIN: 20 OINTMENT TOPICAL at 20:15

## 2023-02-26 RX ADMIN — CALCIUM ACETATE 2001 MG: 667 CAPSULE ORAL at 09:08

## 2023-02-26 RX ADMIN — CALCIUM ACETATE 2001 MG: 667 CAPSULE ORAL at 16:27

## 2023-02-26 RX ADMIN — MIDODRINE HYDROCHLORIDE 10 MG: 10 TABLET ORAL at 16:27

## 2023-02-26 RX ADMIN — GABAPENTIN 100 MG: 100 CAPSULE ORAL at 14:30

## 2023-02-26 RX ADMIN — GABAPENTIN 100 MG: 100 CAPSULE ORAL at 20:14

## 2023-02-26 RX ADMIN — APIXABAN 2.5 MG: 5 TABLET, FILM COATED ORAL at 09:08

## 2023-02-26 RX ADMIN — SODIUM CHLORIDE, PRESERVATIVE FREE 10 ML: 5 INJECTION INTRAVENOUS at 20:13

## 2023-02-26 RX ADMIN — FAMOTIDINE 20 MG: 10 INJECTION INTRAVENOUS at 20:15

## 2023-02-26 RX ADMIN — INSULIN HUMAN 10 UNITS: 100 INJECTION, SOLUTION PARENTERAL at 04:33

## 2023-02-26 RX ADMIN — APIXABAN 2.5 MG: 5 TABLET, FILM COATED ORAL at 20:13

## 2023-02-26 RX ADMIN — SODIUM ZIRCONIUM CYCLOSILICATE 10 G: 10 POWDER, FOR SUSPENSION ORAL at 09:08

## 2023-02-26 RX ADMIN — SODIUM ZIRCONIUM CYCLOSILICATE 10 G: 10 POWDER, FOR SUSPENSION ORAL at 14:30

## 2023-02-26 RX ADMIN — SODIUM BICARBONATE 50 MEQ: 84 INJECTION, SOLUTION INTRAVENOUS at 04:32

## 2023-02-26 RX ADMIN — CALCIUM ACETATE 2001 MG: 667 CAPSULE ORAL at 11:11

## 2023-02-26 RX ADMIN — ATORVASTATIN CALCIUM 40 MG: 40 TABLET, FILM COATED ORAL at 20:13

## 2023-02-26 RX ADMIN — METOPROLOL TARTRATE 25 MG: 25 TABLET, FILM COATED ORAL at 20:14

## 2023-02-26 RX ADMIN — MUPIROCIN: 20 OINTMENT TOPICAL at 14:30

## 2023-02-26 RX ADMIN — MIDODRINE HYDROCHLORIDE 10 MG: 10 TABLET ORAL at 11:09

## 2023-02-26 RX ADMIN — TRAZODONE HYDROCHLORIDE 50 MG: 50 TABLET ORAL at 20:14

## 2023-02-26 RX ADMIN — FAMOTIDINE 20 MG: 10 INJECTION INTRAVENOUS at 14:30

## 2023-02-26 ASSESSMENT — PULMONARY FUNCTION TESTS
PIF_VALUE: 45
PIF_VALUE: 28
PIF_VALUE: 39
PIF_VALUE: 33
PIF_VALUE: 24
PIF_VALUE: 24
PIF_VALUE: 26
PIF_VALUE: 20
PIF_VALUE: 27
PIF_VALUE: 27
PIF_VALUE: 25
PIF_VALUE: 27
PIF_VALUE: 30
PIF_VALUE: 26
PIF_VALUE: 27
PIF_VALUE: 23
PIF_VALUE: 26
PIF_VALUE: 22
PIF_VALUE: 30
PIF_VALUE: 26
PIF_VALUE: 27

## 2023-02-26 ASSESSMENT — LIFESTYLE VARIABLES: HOW OFTEN DO YOU HAVE A DRINK CONTAINING ALCOHOL: NEVER

## 2023-02-26 ASSESSMENT — PAIN SCALES - GENERAL: PAINLEVEL_OUTOF10: 0

## 2023-02-26 NOTE — PROGRESS NOTES
Care rounds completed. Pt on baseline oxygen via trach / vent. Discussed low Bps, ok as long as SBP is 80 or higher per Dr. Yas Sanchez. Nursing communication added. Midodrine given. Will repeat labs this evening. Dialysis tomorrow.  Electronically signed by Daina Oakes RN on 2/26/2023 at 1:15 PM

## 2023-02-26 NOTE — CONSULTS
Thank you to requesting provider:  Shyla Willis MD  , for asking us to see Kalen Pritchett  Reason for consultation:  ESRD  Chief Complaint:  Hyperkalemia    History of Presenting Illness      70 y/o with history of ESRD and chronic respiratory failure s/p trach sent to the hospital with hyperkalemia. She is on dialysis but frequently refuses treatment or cuts the treatment short. She did have a partial treatment yesterday. Potassium is now at 5.9 and she has no ECG changes. Her BP is low and she is somnolent after getting Zyprexa. She has a swollen right arm.       Past Medical/Surgical History      Active Ambulatory Problems     Diagnosis Date Noted    Unresponsive episode     Syncope     Acute respiratory failure with hypoxia and hypercapnia (HCC)     Pneumonia due to infectious organism     ESRD on dialysis [facility])     Chronic respiratory failure requiring continuous mechanical ventilation through tracheostomy ([address]     Acute on chronic respiratory failure with hypoxia (HCC)     Acute pulmonary edema (HCC)     Rectal pain     History of CVA (cerebrovascular accident)     Hospital-acquired pneumonia     Anemia, chronic disease 02/08/2021    Anxiety 10/10/2021    Chronic diastolic heart failure ([address] 10/23/2018    Dysphagia, pharyngeal phase 12/26/2020    Primary hypertension 08/03/2017    GERD (gastroesophageal reflux disease) 10/10/2021    Morbid obesity with BMI of 50.0-59.9, adult ([address] 10/10/2021    SILVER (obstructive sleep apnea) 01/31/2021    Abnormal chest x-ray     Vaginal bleeding 04/26/2022    SBO (small bowel obstruction) ([address] 05/06/2022    Fecal impaction ([address]     Chest pain 09/15/2022    Coronary artery disease involving native heart with unstable angina pectoris ([address] 09/19/2022    Mixed hyperlipidemia 09/19/2022    ESRD on hemodialysis ([address] 09/19/2022    Tracheostomy dependence ([address] 10/27/2022    Leukocytosis 01/20/2023    Macrocytic anemia 01/20/2023    Acute encephalopathy 01/20/2023    HCAP (healthcare-associated pneumonia) 01/20/2023    Severe sepsis ([facility] 01/21/2023    Mucus plug in respiratory tract 01/21/2023    Aspiration of foreign body 01/21/2023    Septic shock ([facility] 01/22/2023     Resolved Ambulatory Problems     Diagnosis Date Noted    Chest pain 06/17/2021    Elevated troponin     Abnormal chest x-ray     Acute blood loss anemia 07/01/2020    Acute deep vein thrombosis (DVT) of brachial vein of left upper extremity ([address] 02/20/2019    Bleeding hemorrhoids 06/29/2020    Cerebellar infarct ([address] 09/29/2019     Past Medical History:   Diagnosis Date    Acute and chronic respiratory failure (acute-on-chronic) ([facility])     Anxiety disorder     Atherosclerotic heart disease of native coronary artery without angina pectoris     Cerebral infarction ([facility])     Chronic pain syndrome     Dependence on renal dialysis ([address]     Dependence on respirator ([facility])     End stage renal disease ([address]     Gastro-esophageal reflux disease with esophagitis, without bleeding     Insomnia     Major depressive disorder, recurrent ([address]     Morbid obesity due to excess calories ([facility])     Muscle weakness     Obstructive sleep apnea (adult) (pediatric)     Other hyperlipidemia     Other voice and resonance disorders     Personal history of COVID-19     Pulmonary hypertension ([address]     Schizo affective schizophrenia ([address] 02/04/2022    Tracheostomy status ([facility]     Type 2 diabetes mellitus without complications ([address]     Unspecified atrial fibrillation ([address]          Review of Systems     Unable to obtain as patient is lethargic and minimal response       Medications      Reviewed in EMR     Allergies     Haloperidol lactate and Ziprasidone hcl      Family History       Negative for Kidney Disease    Social History      Social History     Socioeconomic History    Marital status:      Spouse name: None    Number of children: None    Years of education: None    Highest education level: None   Tobacco Use    Smoking status: Former    Smokeless tobacco: Never   Vaping Use    Vaping Use: Never used   Substance and Sexual Activity    Alcohol use: Never    Drug use: Never    Sexual activity: Not Currently       Physical Exam     Blood pressure (!) 87/53, pulse 66, temperature 98.1 °F (36.7 °C), temperature source Axillary, resp. rate (!) 4, height 5' 4\" (1.626 m), weight (!) 301 lb (136.5 kg), SpO2 100 %. General:  Ill appearing   HEENT:  PERRL, EOMI  Neck:  Supple, normal range of movement  Chest:  Trach, no wheezing, no distress   CV:  Regular, no rub   Abdomen:  NTND, soft, +BS, no hepatosplenomegaly  Extremities:  + peripheral edema, + edema of the right upper arm  Neurological:  Moving all four extremities, CN II-XII grossly intact  Lymphatics:  No palpable lymph nodes  Skin:  No rash, no jaundice  Psychiatric:  Lethargic     Data     Recent Labs     02/25/23  1538 02/26/23  0614   WBC 9.0 7.6   HGB 8.0* 7.4*   HCT 25.5* 24.1*   .5* 103.7*    158     Recent Labs     02/25/23  1538 02/25/23  1710 02/25/23  1819 02/26/23  0047 02/26/23  0614     --   --   --  134*   K 5.9*   < > 6.4* 6.6* 5.9*   CL 95*  --   --   --  99   CO2 28  --   --   --  19*   GLUCOSE 168*  --   --   --  68*   MG 1.70*  --   --   --   --    BUN 78*  --   --   --  75*   CREATININE 7.0*  --   --   --  6.3*   LABGLOM 6*  --   --   --  7*    < > = values in this interval not displayed. Assessment:    ESKD. - On HD at [facility]. Last HD prior to admission was on 1/19/23   - Vascular access: RUE AVF. Fistula is working but has arm swelling and likely to have outflow stenosis. Will need to go to the access center. Partial treatment on 2/25    Chronic respiratory failure   S/p trach      Hypotension  No clear infection, after sedation. Monitoring closely for now      Hyperkalemia.  - Potassium level here is high despite partial dialysis   - ?  Some recirculation due to access stenosis  - K is 5.9 / No ECG changes      Anemia.  - Continue KOKO with HD.   Below target   -Continue Retacrit to 10,000 units with each dialysis     Plan:    Patient does not meet criteria for emergent dialysis to call in dialysis RN  Will continue medical management with MALGORZATA PALOMARES [facility] until tomorrow and then we can do dialysis Monday and Tuesday   Follow labs  Might need midodrine if she become more awake to take medications  Ok to give 500 cc bolus if BP does not trend up     Thank you for asking us to participate in the management of your patient, please do not hesitate to contact me for any concerns regarding my recommendations as outlined above.    -----------------------------  Edward Slade M.D.   [facility]

## 2023-02-26 NOTE — PROGRESS NOTES
Patient very agitated, slapping at staff, pulls off pulse ox, biting at sticker type of pulse ox, will not leave in place, reoriented, not compliant, repositioned. Patient hollers help, pushing call light repeatedly, other staff goes in room patient gets mad other staff. Messaged Dr. Norma Chopra for patient's PRN ativan. Awaiting orders.

## 2023-02-26 NOTE — ED NOTES
Attempted IV access. Pt pulled away and was resisitant to having an IV placed. 1 attempt made in her Left hand. . Pt shaking her head at any further attempts.

## 2023-02-26 NOTE — CONSULTS
[facility]                                 Pulmonary Consult /Progress Note :                                                                  CHIEF COMPLAINT: abnormal lab     Consulting provider: Dr José Yanez     HPI:     71 y.o. female resides at the [facility]. She was sent to the emergency department because of elevated potassium levels. Patient is a trached patient has on a vent. She apparently had hyperkalemia but  did not complete her dialysis session yesterday       She developed pelvic floor pain and she could not finish  HD secession  Potassium was 6.2 postdialysis on she was sent to the hospital for evaluation. Repeat potassium in the emergency department today was 6.4 with no EKG changes. Nephrology was consulted who recommended treating her hyperkalemia medically and admitting the patient for dialysis this am .  Patient denies any other issues at this time.      Past Medical History:   Diagnosis Date    Acute and chronic respiratory failure (acute-on-chronic) ([facility])     Acute blood loss anemia 07/01/2020    Acute deep vein thrombosis (DVT) of brachial vein of left upper extremity ([address] 02/20/2019    Formatting of this note might be different from the original. Dx February 2019    Anxiety disorder     Atherosclerotic heart disease of native coronary artery without angina pectoris     Bleeding hemorrhoids 06/29/2020    Cerebellar infarct ([address] 09/29/2019    Cerebral infarction ([facility])     Chronic pain syndrome     Dependence on renal dialysis ([address]     Dependence on respirator ([facility])     End stage renal disease ([address]     Gastro-esophageal reflux disease with esophagitis, without bleeding     Insomnia     Major depressive disorder, recurrent ([address]     Morbid obesity due to excess calories ([facility])     Muscle weakness     Obstructive sleep apnea (adult) (pediatric)     Other hyperlipidemia     Other voice and resonance disorders     Personal history of COVID-19     Pulmonary hypertension [facility]     Schizo affective schizophrenia ([facility] 02/04/2022    Tracheostomy status ([facility]     Type 2 diabetes mellitus without complications ([facility]     Unspecified atrial fibrillation [facility])        Past Surgical History:        Procedure Laterality Date    COLECTOMY N/A 5/10/2022    EXPLORATORY LAPAROTOMY, LYSIS OF ADHESIONS performed by Fazal Sharma MD at [address]:  is allergic to haloperidol lactate and ziprasidone hcl. Social History:    TOBACCO:   reports that she has quit smoking. She has never used smokeless tobacco.  ETOH:   reports no history of alcohol use. Family History:   History reviewed. No pertinent family history.     Current Medications:    Current Facility-Administered Medications:     traZODone (DESYREL) tablet 50 mg, 50 mg, Oral, Nightly, Amandeep Hodgson MD    sertraline (ZOLOFT) tablet 50 mg, 50 mg, Oral, Daily, Amandeep Hodgson MD    polyethylene glycol (GLYCOLAX) packet 17 g, 17 g, Oral, Daily PRN, Amandeep Hodgson MD    metoprolol tartrate (LOPRESSOR) tablet 25 mg, 25 mg, Oral, BID, Amandeep Hodgson MD    calcium acetate (PHOSLO) capsule 2,001 mg, 2,001 mg, Oral, TID WC, Víctor Garland MD    atorvastatin (LIPITOR) tablet 40 mg, 40 mg, Oral, Nightly, Amandeep Hodgson MD    apixaban (ELIQUIS) tablet 2.5 mg, 2.5 mg, Oral, BID, Amandeep Hodgson MD    gabapentin (NEURONTIN) capsule 100 mg, 100 mg, Oral, TID, Amandeep Hodgson MD    0.9 % sodium chloride infusion, , IntraVENous, PRN, Amandeep Hodgson MD    ondansetron (ZOFRAN-ODT) disintegrating tablet 4 mg, 4 mg, Oral, Q8H PRN **OR** ondansetron (ZOFRAN) injection 4 mg, 4 mg, IntraVENous, Q6H PRN, Amandeep Hodgson MD    acetaminophen (TYLENOL) tablet 650 mg, 650 mg, Oral, Q6H PRN **OR** acetaminophen (TYLENOL) suppository 650 mg, 650 mg, Rectal, Q6H PRN, Amandeep Hodgson MD    HYDROcodone-acetaminophen (NORCO) 5-325 MG per tablet 1 tablet, 1 tablet, Oral, Q6H PRN, Obiora E MD Dada    glucose chewable tablet 16 g, 4 tablet, Oral, PRN, Marilin Menendez MD    dextrose bolus 10% 125 mL, 125 mL, IntraVENous, PRN **OR** dextrose bolus 10% 250 mL, 250 mL, IntraVENous, PRN, Marilin Menendez MD    glucagon (rDNA) injection 1 mg, 1 mg, SubCUTAneous, PRN, Marilin Menendez MD    dextrose 10 % infusion, , IntraVENous, Continuous PRN, Marilin Menendez MD    OLANZapine (ZYPREXA) injection 2.5 mg, 2.5 mg, IntraMUSCular, Q6H PRN, Marilin Menendez MD    sterile water injection, , , ,     [DISCONTINUED] dextrose bolus 10% 125 mL, 125 mL, IntraVENous, PRN **OR** dextrose bolus 10% 250 mL, 250 mL, IntraVENous, PRN, Benjie Perales MD    sodium zirconium cyclosilicate (LOKELMA) oral suspension 10 g, 10 g, Oral, TID, Jazmyne Simental MD, 10 g at 02/26/23 0033    lidocaine 1 % injection 5 mL, 5 mL, IntraDERmal, Once, Benjie Perales MD    sodium chloride flush 0.9 % injection 5-40 mL, 5-40 mL, IntraVENous, 2 times per day, Benjie Perales MD    sodium chloride flush 0.9 % injection 5-40 mL, 5-40 mL, IntraVENous, PRN, Benjie Perales MD      REVIEW OF SYSTEMS:  Has trach ,vented       Objective:   PHYSICAL EXAM:    Blood pressure 88/60, pulse 73, temperature 98.1 °F (36.7 °C), temperature source Axillary, resp. rate 22, height 5' 4\" (1.626 m), weight (!) 301 lb (136.5 kg), SpO2 (!) 89 %.' on RA  Gen: No distress. Eyes: PERRL. No sclera icterus. No conjunctival injection. ENT: No discharge. Pharynx clear. Neck: Trachea midline. No obvious mass. Resp:scattered rhonchi/rales/CTA bilateral   CV: Regular rate. Regular rhythm. No murmur or rub. No edema. GI: Non-tender. Non-distended. No hernia. Skin: Warm and dry. No nodule on exposed extremities. Lymph: No cervical LAD. No supraclavicular LAD. M/S: No cyanosis. No joint deformity. No clubbing. Right arm fistula   Neuro: Awake. Alert. Moves all four extremities. Psych: Oriented x 3. No anxiety. LABS/IMAGING:    CBC:  Lab Results   Component Value Date    WBC 7.6 02/26/2023    HGB 7.4 (L) 02/26/2023    HCT 24.1 (L) 02/26/2023    .7 (H) 02/26/2023     02/26/2023    LYMPHOPCT 17.7 02/26/2023    RBC 2.33 (L) 02/26/2023    MCH 31.9 02/26/2023    MCHC 30.7 (L) 02/26/2023    RDW 15.4 02/26/2023    NEUTOPHILPCT 64.7 02/26/2023    MONOPCT 8.8 02/26/2023    BASOPCT 0.8 02/26/2023    NEUTROABS 4.9 02/26/2023    LYMPHSABS 1.3 02/26/2023    MONOSABS 0.7 02/26/2023    EOSABS 0.6 02/26/2023    BASOSABS 0.1 02/26/2023       Recent Labs     02/26/23  0614 02/25/23  1538 01/29/23  1217   WBC 7.6 9.0 10.5   HGB 7.4* 8.0* 8.5*   HCT 24.1* 25.5* 27.8*   .7* 102.5* 106.7*    215 323       BMP:   Recent Labs     02/25/23  1538 02/25/23  1710 02/25/23  1819 02/26/23  0047 02/26/23  0614     --   --   --  134*   K 5.9*   < > 6.4* 6.6* 5.9*   CL 95*  --   --   --  99   CO2 28  --   --   --  19*   BUN 78*  --   --   --  75*   CREATININE 7.0*  --   --   --  6.3*    < > = values in this interval not displayed.        MG:   Lab Results   Component Value Date/Time    MG 1.70 02/25/2023 03:38 PM     Ca/Phos:   Lab Results   Component Value Date    CALCIUM 8.9 02/26/2023    PHOS 6.7 (H) 01/24/2023     LIVER PROFILE:   Recent Labs     02/25/23  1538   AST 12*   ALT 6*   BILITOT 0.4   ALKPHOS 131*       PT/INR:   Recent Labs     02/25/23  1545   PROTIME 14.6*   INR 1.16*     APTT:   Recent Labs     02/25/23  1545   APTT 32.5           MV Settings:  Vent Mode: AC/VC Resp Rate (Set): 18 bmp/Vt (Set, mL): 300 mL/ /FiO2 : 40 %    IV:   sodium chloride      dextrose             Medications:  Scheduled Meds:   traZODone  50 mg Oral Nightly    sertraline  50 mg Oral Daily    metoprolol tartrate  25 mg Oral BID    calcium acetate  2,001 mg Oral TID WC    atorvastatin  40 mg Oral Nightly    apixaban  2.5 mg Oral BID    gabapentin  100 mg Oral TID    sterile water        sodium zirconium cyclosilicate  10 g Oral TID    lidocaine 1 % injection  5 mL IntraDERmal Once    sodium chloride flush  5-40 mL IntraVENous 2 times per day       PRN Meds:  polyethylene glycol, sodium chloride, ondansetron **OR** ondansetron, acetaminophen **OR** acetaminophen, HYDROcodone 5 mg - acetaminophen, glucose, dextrose bolus **OR** dextrose bolus, glucagon (rDNA), dextrose, OLANZapine, [DISCONTINUED] dextrose bolus **OR** dextrose bolus, sodium chloride flush    Results:  CBC:   Recent Labs     02/25/23  1538 02/26/23  0614   WBC 9.0 7.6   HGB 8.0* 7.4*   HCT 25.5* 24.1*   .5* 103.7*    158     BMP:   Recent Labs     02/25/23  1538 02/25/23  1710 02/25/23  1819 02/26/23  0047 02/26/23  0614     --   --   --  134*   K 5.9*   < > 6.4* 6.6* 5.9*   CL 95*  --   --   --  99   CO2 28  --   --   --  19*   BUN 78*  --   --   --  75*   CREATININE 7.0*  --   --   --  6.3*    < > = values in this interval not displayed. LIVER PROFILE:   Recent Labs     02/25/23  1538   AST 12*   ALT 6*   BILITOT 0.4   ALKPHOS 131*     PT/INR:   Recent Labs     02/25/23  1545   PROTIME 14.6*   INR 1.16*     APTT:   Recent Labs     02/25/23  1545   APTT 32.5     UA:No results for input(s): NITRITE, COLORU, PHUR, LABCAST, WBCUA, RBCUA, MUCUS, TRICHOMONAS, YEAST, BACTERIA, CLARITYU, SPECGRAV, LEUKOCYTESUR, UROBILINOGEN, BILIRUBINUR, BLOODU, GLUCOSEU, AMORPHOUS in the last 72 hours.     Invalid input(s): Nona White    Cultures:      Films:  CXR reviewed by me and it showed pulmonary edema    Assessment:       -pullmonary edema   -Hyperkalemia   -chronic respiratory failure  -ESRD   -Anemia   DM   A fib       Plan:      *-HD as per renal   *-K down ,on Lokelma  *-AC :300/18/40/5   *- seen by nephrology ,HD as per them ,I discussed and recommended to have HD today    *_on zyprexa   *- BMP this pm to call if KL more than 5.9  *-cchronic anemia  No fever or high wbc,no signs of infection  Has mid line   On elquis hakeem a fib    Start pepcid   Bactroban MRSA    Thank you very much for allowing me to participate in the care of this pleasant patient , should you have any questions ,please do not hesitate to contact me      Francoise Bernstein MD,[facility]  Pulmonary&Critical Care Medicine    Vicki Cristina    NOTE: This report was transcribed using voice recognition software. Every effort was made to ensure accuracy; however, inadvertent computerized transcription errors may be present.

## 2023-02-26 NOTE — PROGRESS NOTES
4 Eyes Skin Assessment     The patient is being assess for   Admission    I agree that 2 RN's have performed a thorough Head to Toe Skin Assessment on the patient. ALL assessment sites listed below have been assessed. Areas assessed for pressure by both nurses:  Phil Royal and [place]  [x]   Head, Face, and Ears   [x]   Shoulders, Back, and Chest, Abdomen  [x]   Arms, Elbows, and Hands   [x]   Coccyx, Sacrum, and Ischium  [x]   Legs, Feet, and Heels        Skin Assessed Under all Medical Devices by both nurses:  securement devices              All Mepilex Borders were peeled back and area peeked at by both nurses:  Yes  Please list where Mepilex Borders are located:  right breast, right arm posterior, right inner thigh, right buttock. Also has scratches to left hip, patient scratching hip while RN at bedside. Will continue to monitor. **SHARE this note so that the co-signing nurse is able to place an eSignature**    Co-signer eSignature: Electronically signed by Noreen Eisenmenger, RN on 2/26/23 at 6:31 AM EST    Does the Patient have Skin Breakdown related to pressure? Yes LDA WOUND CARE was Initiated documentation include the Viviana-wound, Wound Assessment, Measurements, Dressing Treatment, Drainage, and Color\",     (Insert Photo here              )         Stu Prevention initiated:  Yes   Wound Care Orders initiated:  Yes      [id number]  nurse consulted for Pressure Injury (Stage 3,4, Unstageable, DTI, NWPT, Complex wounds)and New or Established Ostomies:  No      Primary Nurse eSignature: Electronically signed by Jordan Carlson RN on 2/26/23 at 6:08 AM EST    Patient is not able to demonstrate the ability to move from a reclining position to an upright position within the recliner due to immobility.

## 2023-02-26 NOTE — PROGRESS NOTES
[facility]                                 Pulmonary Consult /Progress Note :                                                                  CHIEF COMPLAINT: abnormal lab       HPI:     71 y.o. female resides at the [facility]. She was sent to the emergency department because of elevated potassium levels. Patient is a trached patient has on a vent. She apparently had hyperkalemia but  did not complete her dialysis session yesterday       She developed pelvic floor pain and she could not finish  HD secession  Potassium was 6.2 postdialysis on she was sent to the hospital for evaluation. Repeat potassium in the emergency department today was 6.4 with no EKG changes. Nephrology was consulted who recommended treating her hyperkalemia medically and admitting the patient for dialysis this am .  Patient denies any other issues at this time.      Past Medical History:   Diagnosis Date    Acute and chronic respiratory failure (acute-on-chronic) ([facility])     Acute blood loss anemia 07/01/2020    Acute deep vein thrombosis (DVT) of brachial vein of left upper extremity ([address] 02/20/2019    Formatting of this note might be different from the original. Dx February 2019    Anxiety disorder     Atherosclerotic heart disease of native coronary artery without angina pectoris     Bleeding hemorrhoids 06/29/2020    Cerebellar infarct ([address] 09/29/2019    Cerebral infarction ([facility])     Chronic pain syndrome     Dependence on renal dialysis ([address]     Dependence on respirator ([facility])     End stage renal disease ([address]     Gastro-esophageal reflux disease with esophagitis, without bleeding     Insomnia     Major depressive disorder, recurrent ([address]     Morbid obesity due to excess calories ([facility])     Muscle weakness     Obstructive sleep apnea (adult) (pediatric)     Other hyperlipidemia     Other voice and resonance disorders     Personal history of COVID-19     Pulmonary hypertension ([facility]     Schizo affective schizophrenia ([facility] 02/04/2022    Tracheostomy status ([facility]     Type 2 diabetes mellitus without complications ([facility]     Unspecified atrial fibrillation [facility])        Past Surgical History:        Procedure Laterality Date    COLECTOMY N/A 5/10/2022    EXPLORATORY LAPAROTOMY, LYSIS OF ADHESIONS performed by Damian Ponce MD at [address]:  is allergic to haloperidol lactate and ziprasidone hcl. Social History:    TOBACCO:   reports that she has quit smoking. She has never used smokeless tobacco.  ETOH:   reports no history of alcohol use. Family History:   History reviewed. No pertinent family history.     Current Medications:    Current Facility-Administered Medications:     traZODone (DESYREL) tablet 50 mg, 50 mg, Oral, Nightly, Jessica Acosta MD    sertraline (ZOLOFT) tablet 50 mg, 50 mg, Oral, Daily, Jessica Acosta MD, 50 mg at 02/26/23 0908    polyethylene glycol (GLYCOLAX) packet 17 g, 17 g, Oral, Daily PRN, Jessica Acosta MD    metoprolol tartrate (LOPRESSOR) tablet 25 mg, 25 mg, Oral, BID, Jessica Acosta MD    calcium acetate (PHOSLO) capsule 2,001 mg, 2,001 mg, Oral, TID WC, Jessica Acosta MD, 2,001 mg at 02/26/23 1111    atorvastatin (LIPITOR) tablet 40 mg, 40 mg, Oral, Nightly, Jessica Acosta MD    apixaban (ELIQUIS) tablet 2.5 mg, 2.5 mg, Oral, BID, Jessica Acosta MD, 2.5 mg at 02/26/23 0908    gabapentin (NEURONTIN) capsule 100 mg, 100 mg, Oral, TID, Jessica Acosta MD    0.9 % sodium chloride infusion, , IntraVENous, PRN, Jessica Acosta MD    ondansetron (ZOFRAN-ODT) disintegrating tablet 4 mg, 4 mg, Oral, Q8H PRN **OR** ondansetron (ZOFRAN) injection 4 mg, 4 mg, IntraVENous, Q6H PRN, Jessica Acosta MD    acetaminophen (TYLENOL) tablet 650 mg, 650 mg, Oral, Q6H PRN **OR** acetaminophen (TYLENOL) suppository 650 mg, 650 mg, Rectal, Q6H PRN, Jessica Acosta MD    HYDROcodone-acetaminophen (NORCO) 5-325 MG per tablet 1 tablet, 1 tablet, Oral, Q6H PRN, Nasim Campbell MD    glucose chewable tablet 16 g, 4 tablet, Oral, PRN, Nasim Campbell MD    dextrose bolus 10% 125 mL, 125 mL, IntraVENous, PRN **OR** dextrose bolus 10% 250 mL, 250 mL, IntraVENous, PRN, Nasim Campbell MD    glucagon (rDNA) injection 1 mg, 1 mg, SubCUTAneous, PRN, Nasim Campbell MD    dextrose 10 % infusion, , IntraVENous, Continuous PRN, Nasim Campbell MD    OLANZapine (ZYPREXA) injection 2.5 mg, 2.5 mg, IntraMUSCular, Q6H PRN, Nasim Campbell MD    sterile water injection, , , ,     midodrine (PROAMATINE) tablet 10 mg, 10 mg, Oral, TID WC, Sylvester Nielsen MD, 10 mg at 02/26/23 1109    famotidine (PEPCID) 20 mg in sodium chloride (PF) 0.9 % 10 mL injection, 20 mg, IntraVENous, BID, Talib Chavez MD    mupirocin (BACTROBAN) 2 % ointment, , Nasal, BID, Talib Chavez MD    [DISCONTINUED] dextrose bolus 10% 125 mL, 125 mL, IntraVENous, PRN **OR** dextrose bolus 10% 250 mL, 250 mL, IntraVENous, PRN, Emily Joyce MD    sodium zirconium cyclosilicate (LOKELMA) oral suspension 10 g, 10 g, Oral, TID, Sylvester Nielsen MD, 10 g at 02/26/23 0908    lidocaine 1 % injection 5 mL, 5 mL, IntraDERmal, Once, Emily Joyce MD    sodium chloride flush 0.9 % injection 5-40 mL, 5-40 mL, IntraVENous, 2 times per day, Emily Joyce MD, 10 mL at 02/26/23 0909    sodium chloride flush 0.9 % injection 5-40 mL, 5-40 mL, IntraVENous, PRN, Emily Joyce MD      REVIEW OF SYSTEMS:  teo Rausch       Objective:   PHYSICAL EXAM:    Blood pressure (!) 118/50, pulse 72, temperature 98.5 °F (36.9 °C), temperature source Axillary, resp. rate (!) 0, height 5' 4\" (1.626 m), weight (!) 301 lb (136.5 kg), SpO2 100 %.' on RA  Gen: No distress. Eyes: PERRL. No sclera icterus. No conjunctival injection. ENT: No discharge. Pharynx clear. Neck: Trachea midline. No obvious mass. Resp:scattered rhonchi/rales/CTA bilateral   CV: Regular rate.  Regular rhythm. No murmur or rub. No edema. GI: Non-tender. Non-distended. No hernia. Skin: Warm and dry. No nodule on exposed extremities. Lymph: No cervical LAD. No supraclavicular LAD. M/S: No cyanosis. No joint deformity. No clubbing. Right arm fistula   Neuro: Awake. Alert. Moves all four extremities. Psych: Oriented x 3. No anxiety. LABS/IMAGING:    CBC:  Lab Results   Component Value Date    WBC 7.6 02/26/2023    HGB 7.4 (L) 02/26/2023    HCT 24.1 (L) 02/26/2023    .7 (H) 02/26/2023     02/26/2023    LYMPHOPCT 17.7 02/26/2023    RBC 2.33 (L) 02/26/2023    MCH 31.9 02/26/2023    MCHC 30.7 (L) 02/26/2023    RDW 15.4 02/26/2023    NEUTOPHILPCT 64.7 02/26/2023    MONOPCT 8.8 02/26/2023    BASOPCT 0.8 02/26/2023    NEUTROABS 4.9 02/26/2023    LYMPHSABS 1.3 02/26/2023    MONOSABS 0.7 02/26/2023    EOSABS 0.6 02/26/2023    BASOSABS 0.1 02/26/2023       Recent Labs     02/26/23  0614 02/25/23  1538 01/29/23  1217   WBC 7.6 9.0 10.5   HGB 7.4* 8.0* 8.5*   HCT 24.1* 25.5* 27.8*   .7* 102.5* 106.7*    215 323         BMP:   Recent Labs     02/25/23  1538 02/25/23  1710 02/25/23  1819 02/26/23  0047 02/26/23  0614     --   --   --  134*   K 5.9*   < > 6.4* 6.6* 5.9*   CL 95*  --   --   --  99   CO2 28  --   --   --  19*   BUN 78*  --   --   --  75*   CREATININE 7.0*  --   --   --  6.3*    < > = values in this interval not displayed.          MG:   Lab Results   Component Value Date/Time    MG 1.70 02/25/2023 03:38 PM     Ca/Phos:   Lab Results   Component Value Date    CALCIUM 8.9 02/26/2023    PHOS 6.7 (H) 01/24/2023     LIVER PROFILE:   Recent Labs     02/25/23  1538   AST 12*   ALT 6*   BILITOT 0.4   ALKPHOS 131*         PT/INR:   Recent Labs     02/25/23  1545   PROTIME 14.6*   INR 1.16*       APTT:   Recent Labs     02/25/23  1545   APTT 32.5             MV Settings:  Vent Mode: AC/VC Resp Rate (Set): 20 bmp/Vt (Set, mL): 400 mL/ /FiO2 : 60 %    IV:   sodium chloride dextrose             Medications:  Scheduled Meds:   traZODone  50 mg Oral Nightly    sertraline  50 mg Oral Daily    metoprolol tartrate  25 mg Oral BID    calcium acetate  2,001 mg Oral TID WC    atorvastatin  40 mg Oral Nightly    apixaban  2.5 mg Oral BID    gabapentin  100 mg Oral TID    sterile water        midodrine  10 mg Oral TID WC    famotidine (PEPCID) injection  20 mg IntraVENous BID    mupirocin   Nasal BID    sodium zirconium cyclosilicate  10 g Oral TID    lidocaine 1 % injection  5 mL IntraDERmal Once    sodium chloride flush  5-40 mL IntraVENous 2 times per day       PRN Meds:  polyethylene glycol, sodium chloride, ondansetron **OR** ondansetron, acetaminophen **OR** acetaminophen, HYDROcodone 5 mg - acetaminophen, glucose, dextrose bolus **OR** dextrose bolus, glucagon (rDNA), dextrose, OLANZapine, [DISCONTINUED] dextrose bolus **OR** dextrose bolus, sodium chloride flush    Results:  CBC:   Recent Labs     02/25/23  1538 02/26/23  0614   WBC 9.0 7.6   HGB 8.0* 7.4*   HCT 25.5* 24.1*   .5* 103.7*    158       BMP:   Recent Labs     02/25/23  1538 02/25/23  1710 02/25/23  1819 02/26/23  0047 02/26/23  0614     --   --   --  134*   K 5.9*   < > 6.4* 6.6* 5.9*   CL 95*  --   --   --  99   CO2 28  --   --   --  19*   BUN 78*  --   --   --  75*   CREATININE 7.0*  --   --   --  6.3*    < > = values in this interval not displayed. LIVER PROFILE:   Recent Labs     02/25/23  1538   AST 12*   ALT 6*   BILITOT 0.4   ALKPHOS 131*       PT/INR:   Recent Labs     02/25/23  1545   PROTIME 14.6*   INR 1.16*       APTT:   Recent Labs     02/25/23  1545   APTT 32.5       UA:No results for input(s): NITRITE, COLORU, PHUR, LABCAST, WBCUA, RBCUA, MUCUS, TRICHOMONAS, YEAST, BACTERIA, CLARITYU, SPECGRAV, LEUKOCYTESUR, UROBILINOGEN, BILIRUBINUR, BLOODU, GLUCOSEU, AMORPHOUS in the last 72 hours.     Invalid input(s): KETONESU    Cultures:      Films:  CXR reviewed by me and it showed pulmonary edema    Assessment:       -pullmonary edema   -Hyperkalemia   -chronic respiratory failure  -ESRD   -Anemia   DM   A fib       Plan:      *-HD as per renal   *-K down ,on Lokelma  *-AC :300/18/40/5   *- seen by nephrology ,HD as per them ,I discussed and recommended to have HD today    *_on zyprexa   *- BMP this pm to call if KL more than 5.9  *-cchronic anemia  No fever or high wbc,no signs of infection  Has mid line   On elquis hakeem a fib    Start pepcid   Bactroban MRSA    Thank you very much for allowing me to participate in the care of this pleasant patient , should you have any questions ,please do not hesitate to contact me      Polly Bernstein MD,[facility]&Critical [facility] Medicine    Ash Aguilera    NOTE: This report was transcribed using voice recognition software. Every effort was made to ensure accuracy; however, inadvertent computerized transcription errors may be present.

## 2023-02-26 NOTE — PROGRESS NOTES
Potassium up to 6.4 from 5.9. Call placed to nephrology. Pt already on [facility], plan to have dialysis tomorrow. No EKG changes noted. Electronically signed by Niesha Emmanuel RN on 2/26/2023 at 5:35 PM    [id number]: Dr. Trinh called back. Need patient to have bowel movement. Will give miralax.  Repeat BMP at 11pm. Electronically signed by Niesha Emmanuel RN on 2/26/2023 at 5:44 PM

## 2023-02-26 NOTE — ED NOTES
Report given to Saad Johnston RN. Patient transported to ICU with Saad Johnston and RT.       Tori Sage RN  02/26/23 2484

## 2023-02-26 NOTE — PROGRESS NOTES
Pt received IM Zyprexa about 6 AM.                    Pt awakens to verbal stimuli but falls right back to sleep. Does not answer any questions regarding orientation. Too lethargic to take PO meds or eat at this time. BPs 80s/40s.

## 2023-02-26 NOTE — ED NOTES
Report received from [facility]. Patient does not currently have a working IV d/t difficult access. Writer called Diana Wilburn, clinical RN to attempt. Dashawn Aguirre NP attempted EJ twice, was unsuccessful.       James Nguyen RN  02/25/23 1932

## 2023-02-26 NOTE — PROGRESS NOTES
Zyprexa IM given for agitation, patient tolerated well, calmer now, cooperative, agrees to wear pulse ox if given applesauce, applesauce given, tolerated well, no choking, will continue to monitor, call light in hand.

## 2023-02-26 NOTE — PROGRESS NOTES
IM Progress Note    Admit Date:  2/25/2023    ESRD, chronic vent patient from nursing home. Did not complete dialysis yesterday, presented to ER. Noted to have persistent hyperkalemia. Started on MALGORZATA SING[facility] and admitted to ICU. Seen by pulmonologist and nephrologist    Subjective:  Ms. Kobe Worthington is currently stable on her chronic vent settings. Awake alert and oriented. No distress. Nephrology is planning on dialysis tomorrow    Objective:   BP (!) 121/50   Pulse 63   Temp 98.5 °F (36.9 °C) (Axillary)   Resp (!) 0   Ht 5' 4\" (1.626 m)   Wt (!) 301 lb (136.5 kg)   SpO2 100%   BMI 51.67 kg/m²     Intake/Output Summary (Last 24 hours) at 2/26/2023 1442  Last data filed at 2/26/2023 1004  Gross per 24 hour   Intake 200.05 ml   Output 0 ml   Net 200.05 ml         Physical Exam:  General:  Awake, alert, NAD  Obese -American female  Chronic vent. Skin:  Warm and dry  Neck:  JVD absent. Neck supple. Tracheostomy present  Chest: Bilateral equal air entry , mild rhonchi. Cardiovascular:  RRR ,S1S2 normal  Abdomen:  Soft, non tender, non distended, BS +  Extremities:  No edema. Intact peripheral pulses.  Brisk cap refill, < 2 secs  Neuro: non focal      Medications:   Scheduled Meds:   traZODone  50 mg Oral Nightly    sertraline  50 mg Oral Daily    metoprolol tartrate  25 mg Oral BID    calcium acetate  2,001 mg Oral TID WC    atorvastatin  40 mg Oral Nightly    apixaban  2.5 mg Oral BID    gabapentin  100 mg Oral TID    sterile water        midodrine  10 mg Oral TID     famotidine (PEPCID) injection  20 mg IntraVENous BID    mupirocin   Nasal BID    lidocaine 1 % injection  5 mL IntraDERmal Once    sodium chloride flush  5-40 mL IntraVENous 2 times per day       Continuous Infusions:   sodium chloride      dextrose         Data:  CBC:   Recent Labs     02/25/23  1538 02/26/23  0614   WBC 9.0 7.6   RBC 2.49* 2.33*   HGB 8.0* 7.4*   HCT 25.5* 24.1*   .5* 103.7*   RDW 14.6 15.4    158 BMP:   Recent Labs     02/25/23  1538 02/25/23  1710 02/25/23  1819 02/26/23  0047 02/26/23  0614     --   --   --  134*   K 5.9*   < > 6.4* 6.6* 5.9*   CL 95*  --   --   --  99   CO2 28  --   --   --  19*   BUN 78*  --   --   --  75*   CREATININE 7.0*  --   --   --  6.3*    < > = values in this interval not displayed. BNP: No results for input(s): BNP in the last 72 hours. PT/INR:   Recent Labs     02/25/23  1545   PROTIME 14.6*   INR 1.16*     APTT:   Recent Labs     02/25/23  1545   APTT 32.5     CARDIAC ENZYMES:   Recent Labs     02/25/23  1538   TROPONINI 0.18*     FASTING LIPID PANEL:  Lab Results   Component Value Date    CHOL 85 09/16/2022    HDL 23 (L) 09/16/2022    TRIG 140 09/16/2022     LIVER PROFILE:   Recent Labs     02/25/23  1538   AST 12*   ALT 6*   BILITOT 0.4   ALKPHOS 131*          Cultures  COVID/Influenza: not detected       Radiology  XR CHEST PORTABLE   Preliminary Result   Stable examination with cardiomegaly and mild central pulmonary vascular   congestion. XR CHEST PORTABLE   Final Result   Findings most compatible with pulmonary interstitial edema. Assessment:  Principal Problem:    Hyperkalemia  Resolved Problems:    * No resolved hospital problems. *      Plan:      Hyperkalemia  ESRD, on HD  -Consulted nephrology.  -No urgent need for HD now. Plan on dialysis tomorrow. -S/p dextrose and insulin ;continue Lokelma. Chronic respiratory failure status post trach, on vent  -Seen by pulmonology . patient stable on her chronic vent settings    Paroxysmal atrial fibrillation  -On Eliquis    Elevated troponin level    Anemia of CKD  -Monitor H&H    Major depression  -Continue Zoloft and Ativan as needed    Diabetes mellitus type 2  -Sliding scale insulin    History of CVA  -Continue aspirin, statins    Chronic pain syndrome  -On gabapentin and hydrocodone          DVT Prophylaxis: Eliquis  Diet: ADULT DIET;  Regular; Low Potassium (Less than 3000 mg/day); Low Phosphorus (Less than 1000 mg).  Patient is tolerating diet well  Code Status: Full Code      Can downgrade to PCU level of care       Shaniqua Shepherd MD   2/26/2023 2:42 PM

## 2023-02-26 NOTE — PROGRESS NOTES
Pt mentation improved and was able to take PO meds crushed in applesauce. No s/s of aspiration.  Electronically signed by Lorri Watt RN on 2/26/2023 at 10:58 AM

## 2023-02-26 NOTE — PROGRESS NOTES
02/25/23 2033   Patient Observation   Heart Rate 64   SpO2 99 %   Observations #6 SHINIELS. AMBU BAG AT HEAD OF BED. WATER GOOD. Breath Sounds   Right Upper Lobe Rhonchi   Right Middle Lobe Rhonchi   Right Lower Lobe Rhonchi   Left Upper Lobe Rhonchi   Left Lower Lobe Rhonchi   Vent Information   Vent Mode AC/VC   Ventilator Settings   Vt (Set, mL) 300 mL   Resp Rate (Set) 18 bmp   PEEP/CPAP (cmH2O) 5   FiO2  (S)  40 %   Vent Patient Data (Readings)   Vt (Measured) 317 mL   Peak Inspiratory Pressure (cmH2O) 33 cmH2O   Rate Measured 18 br/min   Minute Volume (L/min) 6.1 Liters   Peak Inspiratory Flow (lpm) 50 L/sec   Mean Airway Pressure (cmH2O) 10 cmH20   Plateau Pressure (cm H2O) 15 cm H2O   Driving Pressure 10   I:E Ratio 1:4.1   Flow Sensitivity 3 L/min   Static Compliance (L/cm H2O) 32   Dynamic Compliance (L/cm H2O) 11 L/cm H2O   Vent Alarm Settings   High Pressure (cmH2O) 50 cmH2O   Low Minute Volume (lpm) 2.5 L/min   High Minute Volume (lpm) 24 L/min   Low Exhaled Vt (ml) 235 mL   High Exhaled Vt (ml) 1000 mL   RR High (bpm) 50 br/min   Apnea (secs) 20 secs   Additional Respiratoray Assessments   Humidification Source Heated wire   Humidification Temp 37   Circuit Condensation Drained   Ambu Bag With Mask At Bedside Yes   Airway Clearance   Suction Trach   Suction Device Inline suction catheter   Sputum Method Obtained Tracheal   Sputum Amount Small   Sputum Color/Odor Yellow; Tan   Sputum Consistency Thick

## 2023-02-26 NOTE — ED NOTES
2008 Consult completed with call back from DR. Kanslerinrinne 45 speaking with Dr. Nidhi Farris  02/25/23 2101         Malik Leblanc  02/25/23 2102

## 2023-02-26 NOTE — PROGRESS NOTES
Patient admission assessment complete, patient on ventilator, 40% FiO2 with PEEP of 5, , rate 18. A/O, pleasant and cooperative, wounds assessed, pictures in 4 eye assessment note. Interdry placed in skin folds. Fistula right arm good bruit and thrill, to have dialysis today, refused dialysis Thursday and did not receive full treatment on Saturday, last potassium 6.6, difficulty obtaining IV access, mid line single lumen in left arm. Call light in reach, will continue to monitor.

## 2023-02-26 NOTE — PROGRESS NOTES
Upon arrival to place PICC assessed chart for issues related to PICC placement, check for consent, and did time out with RN Jocelyn Rdz. 1st attempt to place picc unable to pass PICC through shoulder in brachial vein. No basilic to attempt. 2nd attempt failed to pass picc through cephalic vein. Left midline Pt. Tolerated midline placement well, no difficulty accessing Cephalic vein, threaded well, with scant flowing blood return.  Reported off to [place]

## 2023-02-26 NOTE — ED NOTES
Spoke with Sherita Caldera, clinical in regards to what time PICC team would be here as Dr. Rinku Gan is requesting how long it will be. Sherita Caldera did not have a time as the PICC team did not give her one. Dr. Rinku Gan aware.       Ellis Sky, RN  02/25/23 6784

## 2023-02-26 NOTE — H&P
Hospital Medicine History & Physical      PCP: Danny Carlos MD    Date of Admission: 2/25/2023    Date of Service: Pt seen/examined on 2/25/2023    Chief Complaint: Abnormal lab    History Of Present Illness:    71 y.o. female with multiple medical issues who resides at the [facility]. She was sent to the emergency department because of elevated potassium levels. Patient is a trached patient has on a vent. She apparently had hyperkalemia and unfortunately did not complete her dialysis session today. She said she stopped dialysis because she was having pelvic floor pain. Potassium was 6.2 postdialysis on she was sent to the hospital for evaluation. Repeat potassium in the emergency department today was 6.4, there were no EKG changes. Nephrology was consulted who recommended treating her hyperkalemia medically and admitting the patient for dialysis in the a.m. Patient denies any other issues at this time.       Past Medical History:          Diagnosis Date    Acute and chronic respiratory failure (acute-on-chronic) ([facility])     Acute blood loss anemia 7/1/2020    Acute deep vein thrombosis (DVT) of brachial vein of left upper extremity ([address] 2/20/2019    Formatting of this note might be different from the original. Dx February 2019    Anxiety disorder     Atherosclerotic heart disease of native coronary artery without angina pectoris     Bleeding hemorrhoids 6/29/2020    Cerebellar infarct ([address] 9/29/2019    Cerebral infarction ([facility])     Chronic pain syndrome     Dependence on renal dialysis ([address]     Dependence on respirator ([facility])     End stage renal disease ([address]     Gastro-esophageal reflux disease with esophagitis, without bleeding     Insomnia     Major depressive disorder, recurrent ([address]     Morbid obesity due to excess calories ([facility])     Muscle weakness     Obstructive sleep apnea (adult) (pediatric)     Other hyperlipidemia     Other voice and resonance disorders     Personal history of COVID-19     Pulmonary hypertension (HCC)     Tracheostomy status (HCC)     Type 2 diabetes mellitus without complications ([facility]     Unspecified atrial fibrillation [facility])        Past Surgical History:          Procedure Laterality Date    COLECTOMY N/A 5/10/2022    EXPLORATORY LAPAROTOMY, LYSIS OF ADHESIONS performed by Karyna Swan MD at [address] [place] [address]       Medications Prior to Admission:      Prior to Admission medications    Medication Sig Start Date End Date Taking? Authorizing Provider   LORazepam (ATIVAN) 0.5 MG tablet Take 0.5 mg by mouth every 12 hours as needed for Anxiety. For anxiety   Yes Historical Provider, MD   LORazepam (ATIVAN) 0.5 MG tablet Take 0.5 mg by mouth daily. Every Tue, Thurs, Sat for anxiety/agitation prior to HD treatment   Yes Historical Provider, MD   oxyCODONE (ROXICODONE) 5 MG immediate release tablet Take 5 mg by mouth in the morning and 5 mg in the evening. Yes Historical Provider, MD   metoprolol tartrate (LOPRESSOR) 25 MG tablet Take 1 tablet by mouth 2 times daily Hold for SBP less than 110 1/24/23   Lynnette Taylor MD   gabapentin (NEURONTIN) 300 MG capsule Take 300 mg by mouth 3 times daily.     Historical Provider, MD   sennosides-docusate sodium (SENOKOT-S) 8.6-50 MG tablet Take 2 tablets by mouth at bedtime    Historical Provider, MD   BISACODYL RE Place rectally daily as needed    Historical Provider, MD   Magnesium Hydroxide (DULCOLAX PO) Take 10 mg by mouth daily as needed    Historical Provider, MD   sodium phosphate (FLEET) 7-19 GM/118ML Place 1 enema rectally once as needed    Historical Provider, MD   UNABLE TO FIND Veltessa 8.4 gm by mouth one time a day every Monday, Wednesday, Friday, Sunday for hyperkalemia    Historical Provider, MD   atorvastatin (LIPITOR) 40 MG tablet Take 1 tablet by mouth nightly 10/28/22   Sofi Haskins MD   carboxymethylcellulose PF (THERATEARS) 1 % GEL ophthalmic gel Place 1 drop into both eyes every 4 hours as needed for Dry Eyes 10/28/22   Cheli Butler MD   apixaban (ELIQUIS) 2.5 MG TABS tablet Take 2.5 mg by mouth 2 times daily    Historical Provider, MD   loperamide (IMODIUM) 2 MG capsule Take 2 mg by mouth 4 times daily as needed for Diarrhea    Historical Provider, MD   promethazine (PHENERGAN) 12.5 MG tablet Take 12.5 mg by mouth every 6 hours as needed for Nausea    Historical Provider, MD   traZODone (DESYREL) 50 MG tablet Take 50 mg by mouth nightly    Historical Provider, MD   vitamin B-12 (CYANOCOBALAMIN) 500 MCG tablet Take 500 mcg by mouth daily    Historical Provider, MD   warfarin (COUMADIN) 3 MG tablet Take 1 tablet by mouth nightly  Patient not taking: Reported on 9/15/2022 4/30/22 9/19/22  Dominique Patel MD   hydrocortisone (ANUSOL-HC) 2.5 % CREA rectal cream Place rectally 2 times daily 3/3/22   Maeve Haddad DO   calcium acetate 667 MG TABS Take 3 capsules by mouth 3 times daily (with meals)    Historical Provider, MD   hydrocortisone 1 % cream Apply topically 2 times daily Apply topically 2 times daily. Historical Provider, MD   acetaminophen (TYLENOL) 325 MG tablet Take 650 mg by mouth every 6 hours as needed for Pain    Historical Provider, MD   diphenhydrAMINE (BENADRYL) 25 MG capsule Take 25 mg by mouth every 8 hours as needed     Historical Provider, MD   polyethylene glycol (GLYCOLAX) 17 g packet Take 17 g by mouth daily as needed for Constipation    Historical Provider, MD   ipratropium-albuterol (DUONEB) 0.5-2.5 (3) MG/3ML SOLN nebulizer solution Inhale 1 vial into the lungs every 4 hours    Historical Provider, MD   midodrine (PROAMATINE) 5 MG tablet Take 10 mg by mouth three times a week Tues thurs sat pre dialysis.  Hold for SBP >130    Historical Provider, MD   omeprazole (PRILOSEC) 20 MG delayed release capsule Take 20 mg by mouth daily    Historical Provider, MD   chlorhexidine (PERIDEX) 0.12 % solution Take 15 mLs by mouth 2 times daily    Historical Provider, MD   sertraline (ZOLOFT) 50 MG tablet Take 50 mg by mouth daily    Historical Provider, MD   furosemide (LASIX) 20 MG tablet Take 20 mg by mouth three times a week [facility]  Patient not taking: Reported on 9/15/2022  9/19/22  Historical Provider, MD       Allergies:  Haloperidol lactate and Ziprasidone hcl    Social History:      TOBACCO:   reports that she has quit smoking. She has never used smokeless tobacco.  ETOH:   reports no history of alcohol use. Family History:      History reviewed. No pertinent family history. REVIEW OF SYSTEMS:   Constitutional:  Negative for fever,chills or night sweats  ENT:  Negative for rhinorrhea, epistaxis, hoarseness, sore throat. Respiratory: Negative for SOB or wheezing   Cardiovascular:   Negative for  chest pain, palpitations   Gastrointestinal:  Negative for nausea, vomiting, diarrhea  Genitourinary:  Negative for polyuria, dysuria   Hematologic/Lymphatic:  Negative for  bleeding tendency, easy bruising  Musculoskeletal:  Negative for myalgias and arthralgias  Neurologic:  Negative for  confusion,dysarthria. Skin:  Negative for itching,rash  Psychiatric:  Negative for depression,anxiety, agitation. Endocrine:  Negative for polydipsia,polyuria,heat /cold intolerance. PHYSICAL EXAM:    BP (!) 130/39   Pulse 69   Temp 98.1 °F (36.7 °C) (Oral)   Resp 15   Ht 5' 4\" (1.626 m)   Wt (!) 301 lb (136.5 kg)   SpO2 99%   BMI 51.67 kg/m²   General appearance: No distress, alert and oriented x3  HEENT:  Normal cephalic, atraumatic without obvious deformity. Pupils equal, round, and reactive to light. Extra ocular muscles intact. Conjunctivae/corneas clear. Neck: Trach collar midline  Respiratory: Mechanical breath sounds  Cardiovascular:  Regular rate and rhythm with normal S1/S2 without murmurs, rubs or gallops. Abdomen: Soft, non-tender, non-distended with normal bowel sounds. Musculoskeletal:  No clubbing, cyanosis or edema bilaterally.   Full range of motion without deformity. Skin: Skin color, texture, turgor normal.  No rashes or lesions. Neurologic: Nonfocal  Psychiatric: Endorses depression  Capillary Refill: Brisk,< 3 seconds   Peripheral Pulses: +2 palpable, equal bilaterally       Labs:   Recent Labs     02/25/23  1538   WBC 9.0   HGB 8.0*   HCT 25.5*        Recent Labs     02/25/23  1538 02/25/23  1710 02/25/23  1819 02/26/23  0047     --   --   --    K 5.9* 6.4* 6.4* 6.6*   CL 95*  --   --   --    CO2 28  --   --   --    BUN 78*  --   --   --    CREATININE 7.0*  --   --   --    CALCIUM 9.6  --   --   --      Recent Labs     02/25/23  1538   AST 12*   ALT 6*   BILITOT 0.4   ALKPHOS 131*     Recent Labs     02/25/23  1545   INR 1.16*     Recent Labs     02/25/23  1538   TROPONINI 0.18*         Radiology:   XR CHEST PORTABLE   Final Result   Findings most compatible with pulmonary interstitial edema. ASSESSMENT:  Hyperkalemia  ESRD  Chronic respiratory failure status post trach, on vent  Paroxysmal atrial fibrillation  Elevated troponin level  Anemia of CKD  Major depression  Diabetes mellitus type 2  History of CVA  Chronic pain syndrome    PLAN:  Nephrology has been consulted, dialysis will be scheduled tomorrow. Was started on Amee Lose, will treat with dextrose and insulin therapy.  -Resume home medications including, statin, aspirin  -Eliquis for paroxysmal atrial fibrillation  -Resume as needed Ativan and Zoloft  -Treat pain with gabapentin and Roxicodone  -Sliding scale insulin therapy  -Respiratory consulted for vent management    DVT Prophylaxis: Eliquis  Diet: ADULT DIET; Regular; Low Potassium (Less than 3000 mg/day); Low Phosphorus (Less than 1000 mg)  Code Status: Full Code    Dispo -admit to [facility] on telemetry. Thank you for the opportunity to be involved in this patient's care.       (Please note that portions of this note were completed with a voice recognition program. Efforts were made to edit the dictations but occasionally words are mis-transcribed.)

## 2023-02-26 NOTE — PLAN OF CARE
Problem: Discharge Planning  Goal: Discharge to home or other facility with appropriate resources  Outcome: Progressing  Flowsheets  Taken 2/26/2023 0341  Discharge to home or other facility with appropriate resources:   Identify barriers to discharge with patient and caregiver   Identify discharge learning needs (meds, wound care, etc)   Refer to discharge planning if patient needs post-hospital services based on physician order or complex needs related to functional status, cognitive ability or social support system   Arrange for needed discharge resources and transportation as appropriate   Arrange for interpreters to assist at discharge as needed  Taken 2/26/2023 0228  Discharge to home or other facility with appropriate resources: Identify barriers to discharge with patient and caregiver  Taken 2/26/2023 0115  Discharge to home or other facility with appropriate resources:   Identify barriers to discharge with patient and caregiver   Refer to discharge planning if patient needs post-hospital services based on physician order or complex needs related to functional status, cognitive ability or social support system     Problem: Pain  Goal: Verbalizes/displays adequate comfort level or baseline comfort level  Outcome: Progressing     Problem: Safety - Adult  Goal: Free from fall injury  Outcome: Progressing     Problem: ABCDS Injury Assessment  Goal: Absence of physical injury  Outcome: Progressing     Problem: Skin/Tissue Integrity  Goal: Absence of new skin breakdown  Description: 1. Monitor for areas of redness and/or skin breakdown  2. Assess vascular access sites hourly  3. Every 4-6 hours minimum:  Change oxygen saturation probe site  4. Every 4-6 hours:  If on nasal continuous positive airway pressure, respiratory therapy assess nares and determine need for appliance change or resting period.   Outcome: Progressing

## 2023-02-26 NOTE — ED PROVIDER NOTES
I was available for consultation on the patient if deemed necessary by ER staff. I was not involved in the care of this patient nor had any participation in the medical decision making process. I was the attending on duty when the patient was boarded in the ER while awaiting a bed assignment or transfer. I was available for consultation but was not requested to evaluate the patient, nor asked supervised the case. I did not see the patient and I am signing this chart administratively after the fact. EKG  The Ekg interpreted by me shows  normal sinus rhythm and AV block  with a rate of 65  Axis is   Normal  QTc is  normal  Intervals and Durations are unremarkable. ST Segments: Low voltage  Delta waves, Brugada Syndrome, and Short MN are not present. Prior EKG to compare with was available.  No significant changes compared to prior EKG from February 25, 2023       Gary Parnell MD  02/26/23 [id number]

## 2023-02-26 NOTE — PROGRESS NOTES
Blood pressure 88/60, pulse 73, temperature 98.1 °F (36.7 °C), temperature source Axillary, resp. rate 22, height 5' 4\" (1.626 m), weight (!) 301 lb (136.5 kg), SpO2 (!) 89 %. Vent settings are 18/300/+5/40%. Shift assessment completed. Pt very lethargic after Zyprexa administration. Does awaken to verbal stimuli. Meds held at this time - Dr. Genetta Hammans aware. No dialysis today, plan for tomorrow. SR on monitor- hx afib. Some low BPs this AM- Notified Dr. Genetta Hammans, midodrine added. Chronic trach / vent  6xlt Shiley 300/18/40%/+5. Large amount of secretions. No urine OP thus far. Repositioned in bed. Inter-dry in folds. Wounds dressed. Midline dressing with blood- will change when clotted.  Electronically signed by Senait Lopez RN on 2/26/2023 at 9:01 AM

## 2023-02-26 NOTE — ED NOTES
Consent obtained from patient for PICC line and witnessed by Olegario Dillon.       Radu Munoz, RN  02/25/23 7721

## 2023-02-26 NOTE — ED PROVIDER NOTES
Emergency Department Encounter    Patient: Isaias Pitts  MRN: [id number]  : 1950  Date of Evaluation: 2023  ED Provider:  Loney Schwab, MD    Triage Chief Complaint:   Abnormal Lab (Pt sent in from [facility] due to elevated potassium level of 6.2. Pt received partial of dialysis treatment today. Pt is awake and alert. )    [place]:  Isaias Pitts is a 71 y.o. female with history seen below presenting with hyperkalemia. Patient is on dialysis . Patient states she got about half of her dialysis today but was stopped because of leg cramping. Patient was sent in because she was hyperkalemic over 6. Patient denies chest pain, shortness of breath, abdominal pain, nausea vomiting, diarrhea constipation. Patient was recently diagnosed with pneumonia. Patient is chronically trached and vented. Denies fevers or chills.   Denies headache, blurred vision, focal deficits, motor or sensory changes    ROS - see HPI, below listed is current ROS at time of my eval:  At least 14 systems reviewed, negative other HPI  Past Medical History:   Diagnosis Date    Acute and chronic respiratory failure (acute-on-chronic) (HCC)     Acute blood loss anemia 2020    Acute deep vein thrombosis (DVT) of brachial vein of left upper extremity ([address] 2019    Formatting of this note might be different from the original. Dx 2019    Anxiety disorder     Atherosclerotic heart disease of native coronary artery without angina pectoris     Bleeding hemorrhoids 2020    Cerebellar infarct ([address] 2019    Cerebral infarction (HCC)     Chronic pain syndrome     Dependence on renal dialysis ([address]     Dependence on respirator (HCC)     End stage renal disease ([address]     Gastro-esophageal reflux disease with esophagitis, without bleeding     Insomnia     Major depressive disorder, recurrent ([address]     Morbid obesity due to excess calories (HCC)     Muscle weakness     Obstructive sleep apnea (adult) (pediatric)     Other hyperlipidemia     Other voice and resonance disorders     Personal history of COVID-19     Pulmonary hypertension ([facility]     Tracheostomy status ([facility]     Type 2 diabetes mellitus without complications ([facility]     Unspecified atrial fibrillation [facility])      Past Surgical History:   Procedure Laterality Date    COLECTOMY N/A 5/10/2022    EXPLORATORY LAPAROTOMY, LYSIS OF ADHESIONS performed by Nargis Thornton MD at [address] [place] [address]     History reviewed. No pertinent family history.   Social History     Socioeconomic History    Marital status:      Spouse name: Not on file    Number of children: Not on file    Years of education: Not on file    Highest education level: Not on file   Occupational History    Not on file   Tobacco Use    Smoking status: Former    Smokeless tobacco: Never   Substance and Sexual Activity    Alcohol use: Never    Drug use: Never    Sexual activity: Not Currently   Other Topics Concern    Not on file   Social History Narrative    Not on file     Social Determinants of Health     Financial Resource Strain: Not on file   Food Insecurity: Not on file   Transportation Needs: Not on file   Physical Activity: Not on file   Stress: Not on file   Social Connections: Not on file   Intimate Partner Violence: Not on file   Housing Stability: Not on file     Current Facility-Administered Medications   Medication Dose Route Frequency Provider Last Rate Last Admin    glucose chewable tablet 16 g  4 tablet Oral PRN Gigi Joaquin MD        dextrose bolus 10% 125 mL  125 mL IntraVENous PRN Gigi Joaquin MD        Or    dextrose bolus 10% 250 mL  250 mL IntraVENous PRN Gigi Joaquin MD        glucagon (rDNA) injection 1 mg  1 mg SubCUTAneous PRN Gigi Joaquin MD        dextrose 10 % infusion   IntraVENous Continuous PRN Gigi Joaquin MD        sodium zirconium cyclosilicate (LOKELMA) oral suspension 10 g  10 g Oral TID Margot Pal MD Current Outpatient Medications   Medication Sig Dispense Refill    metoprolol tartrate (LOPRESSOR) 25 MG tablet Take 1 tablet by mouth 2 times daily Hold for SBP less than 110 60 tablet 3    gabapentin (NEURONTIN) 300 MG capsule Take 300 mg by mouth 3 times daily. sennosides-docusate sodium (SENOKOT-S) 8.6-50 MG tablet Take 2 tablets by mouth at bedtime      BISACODYL RE Place rectally daily as needed      Magnesium Hydroxide (DULCOLAX PO) Take 10 mg by mouth daily as needed      sodium phosphate (FLEET) 7-19 GM/118ML Place 1 enema rectally once as needed      UNABLE TO FIND Veltessa 8.4 gm by mouth one time a day every Monday, Wednesday, Friday, Sunday for hyperkalemia      atorvastatin (LIPITOR) 40 MG tablet Take 1 tablet by mouth nightly 30 tablet 3    carboxymethylcellulose PF (THERATEARS) 1 % GEL ophthalmic gel Place 1 drop into both eyes every 4 hours as needed for Dry Eyes      apixaban (ELIQUIS) 2.5 MG TABS tablet Take 2.5 mg by mouth 2 times daily      loperamide (IMODIUM) 2 MG capsule Take 2 mg by mouth 4 times daily as needed for Diarrhea      promethazine (PHENERGAN) 12.5 MG tablet Take 12.5 mg by mouth every 6 hours as needed for Nausea      traZODone (DESYREL) 50 MG tablet Take 50 mg by mouth nightly      vitamin B-12 (CYANOCOBALAMIN) 500 MCG tablet Take 500 mcg by mouth daily      hydrocortisone (ANUSOL-HC) 2.5 % CREA rectal cream Place rectally 2 times daily 1 each 0    calcium acetate 667 MG TABS Take 3 capsules by mouth 3 times daily (with meals)      hydrocortisone 1 % cream Apply topically 2 times daily Apply topically 2 times daily.       acetaminophen (TYLENOL) 325 MG tablet Take 650 mg by mouth every 6 hours as needed for Pain      diphenhydrAMINE (BENADRYL) 25 MG capsule Take 25 mg by mouth every 8 hours as needed       polyethylene glycol (GLYCOLAX) 17 g packet Take 17 g by mouth daily as needed for Constipation      ipratropium-albuterol (DUONEB) 0.5-2.5 (3) MG/3ML SOLN nebulizer solution Inhale 1 vial into the lungs every 4 hours      midodrine (PROAMATINE) 5 MG tablet Take 10 mg by mouth three times a week Rick myles sat pre dialysis      omeprazole (PRILOSEC) 20 MG delayed release capsule Take 20 mg by mouth daily      chlorhexidine (PERIDEX) 0.12 % solution Take 15 mLs by mouth 2 times daily      sertraline (ZOLOFT) 50 MG tablet Take 50 mg by mouth daily       Allergies   Allergen Reactions    Haloperidol Lactate Other (See Comments)     PT DOESN'T REMEMBER      Ziprasidone Hcl Other (See Comments)     PT DOESN'T REMEMBER         Nursing Notes Reviewed    Physical Exam:  Triage VS:    ED Triage Vitals   Enc Vitals Group      BP 02/25/23 1514 (!) 132/101      Heart Rate 02/25/23 1501 70      Resp 02/25/23 1501 17      Temp 02/25/23 1501 97.7 °F (36.5 °C)      Temp Source 02/25/23 1501 Oral      SpO2 02/25/23 1501 100 %      Weight 02/25/23 1501 (!) 305 lb (138.3 kg)      Height 02/25/23 1501 5' 4\" (1.626 m)      Head Circumference --       Peak Flow --       Pain Score --       Pain Loc --       Pain Edu? --       Excl. in [address]? --        My pulse ox interpretation is - normal    General appearance:  No acute distress. Skin:  Warm. Dry. Eye:  Extraocular movements intact. Ears, nose, mouth and throat:  Oral mucosa moist   Neck:  Trachea midline. Chronic trach  Extremity:  No swelling. Normal ROM     Heart:  Regular rate and rhythm, normal S1 & S2, no extra heart sounds. Perfusion:  intact  Respiratory:  Lungs clear to auscultation bilaterally. Respirations nonlabored. Abdominal:  Normal bowel sounds. Soft. Nontender. Non distended. Back:  No CVA tenderness to palpation     Neurological:  Alert and oriented times 3. No focal neuro deficits.              Psychiatric:  Appropriate    I have reviewed and interpreted all of the currently available lab results from this visit (if applicable):  Results for orders placed or performed during the hospital encounter of 02/25/23 COVID-19 & Influenza Combo    Specimen: Nasopharyngeal Swab   Result Value Ref Range    SARS-CoV-2 RNA, RT PCR NOT DETECTED NOT DETECTED    INFLUENZA A NOT DETECTED NOT DETECTED    INFLUENZA B NOT DETECTED NOT DETECTED   CBC with Auto Differential   Result Value Ref Range    WBC 9.0 4.0 - 11.0 K/uL    RBC 2.49 (L) 4.00 - 5.20 M/uL    Hemoglobin 8.0 (L) 12.0 - 16.0 g/dL    Hematocrit 25.5 (L) 36.0 - 48.0 %    .5 (H) 80.0 - 100.0 fL    MCH 31.9 26.0 - 34.0 pg    MCHC 31.2 31.0 - 36.0 g/dL    RDW 14.6 12.4 - 15.4 %    Platelets 490 663 - 341 K/uL    MPV 8.7 5.0 - 10.5 fL    Neutrophils % 72.8 %    Lymphocytes % 12.4 %    Monocytes % 4.7 %    Eosinophils % 8.6 %    Basophils % 1.5 %    Neutrophils Absolute 6.5 1.7 - 7.7 K/uL    Lymphocytes Absolute 1.1 1.0 - 5.1 K/uL    Monocytes Absolute 0.4 0.0 - 1.3 K/uL    Eosinophils Absolute 0.8 (H) 0.0 - 0.6 K/uL    Basophils Absolute 0.1 0.0 - 0.2 K/uL   Magnesium   Result Value Ref Range    Magnesium 1.70 (L) 1.80 - 2.40 mg/dL   Comprehensive Metabolic Panel   Result Value Ref Range    Sodium 136 136 - 145 mmol/L    Potassium 5.9 (H) 3.5 - 5.1 mmol/L    Chloride 95 (L) 99 - 110 mmol/L    CO2 28 21 - 32 mmol/L    Anion Gap 13 3 - 16    Glucose 168 (H) 70 - 99 mg/dL    BUN 78 (H) 7 - 20 mg/dL    Creatinine 7.0 (HH) 0.6 - 1.2 mg/dL    Est, Glom Filt Rate 6 (A) >60    Calcium 9.6 8.3 - 10.6 mg/dL    Total Protein 8.8 (H) 6.4 - 8.2 g/dL    Albumin 3.4 3.4 - 5.0 g/dL    Albumin/Globulin Ratio 0.6 (L) 1.1 - 2.2    Total Bilirubin 0.4 0.0 - 1.0 mg/dL    Alkaline Phosphatase 131 (H) 40 - 129 U/L    ALT 6 (L) 10 - 40 U/L    AST 12 (L) 15 - 37 U/L   Troponin   Result Value Ref Range    Troponin 0.18 (H) <0.01 ng/mL   Potassium   Result Value Ref Range    Potassium 6.4 (HH) 3.5 - 5.1 mmol/L   Potassium   Result Value Ref Range    Potassium 6.4 (HH) 3.5 - 5.1 mmol/L   Protime-INR   Result Value Ref Range    Protime 14.6 (H) 11.7 - 14.5 sec    INR 1.16 (H) 0.87 - 1.14   APTT   Result Value Ref Range    aPTT 32.5 23.0 - 34.3 sec   EKG 12 Lead   Result Value Ref Range    Ventricular Rate 67 BPM    Atrial Rate 67 BPM    P-R Interval 250 ms    QRS Duration 82 ms    Q-T Interval 398 ms    QTc Calculation (Bazett) 420 ms    P Axis 46 degrees    R Axis -10 degrees    T Axis 25 degrees    Diagnosis       Sinus rhythm with 1st degree A-V blockCannot rule out Anterior infarct , age undeterminedAbnormal ECGWhen compared with ECG of 29-JAN-2023 11:49,Premature supraventricular complexes are no longer PresentNonspecific T wave abnormality, worse in Anterior leads        Radiographs (if obtained):  Radiologist's Report Reviewed:  XR CHEST PORTABLE    Result Date: 2/25/2023  EXAMINATION: ONE XRAY VIEW OF THE CHEST 2/25/2023 12:40 pm COMPARISON: 01/29/2023 HISTORY: ORDERING SYSTEM PROVIDED HISTORY: missed dialysis TECHNOLOGIST PROVIDED HISTORY: Reason for exam:->missed dialysis Reason for Exam: missed dialysis FINDINGS: To take ostomy tube again noted.  Cardial pericardial silhouette is enlarged, similar compared to the previous exam.  The pulmonary vasculature is congested with increased interstitial opacity.  Elevation of the right hemidiaphragm noted.  No pneumothorax.  No free air.     Findings most compatible with pulmonary interstitial edema.       EKG (if obtained): (All EKG's are interpreted by myself in the absence of a cardiologist)  Sinus rhythm with first-degree AV block, ventricular rate 67, MD interval 250, QRS duration 82, QTc 420, no significant ST elevation or depression    MDM:    72-year-old female presenting with hyperkalemia.  History and be seen above.  Vitals on presentation are reassuring and patient afebrile satting on room air.  Physical exam can be seen above.  Trope is at baseline for patient actually below patient's baseline.  Potassium is 6.4.  I do not see any significant EKG changes.  Hyperkalemic protocol was ordered.  CBC reveals no leukocytosis.  Hemoglobin is stable  for patient 8.0.  COVID and flu are negative. Chest x-ray consistent with pulmonary interstitial edema. Patient initially had IV but the IV blew. Multiple IVs in the ED unable to obtain 1. Discussed with nephrology who is okay with a midline. PICC team was called. I did discuss with nephrology and discussion with nephrology will admit patient to hospital service for potassium monitoring, cardiac monitoring with dialysis in the morning. Hospitalist was called patient mother service    Clinical Impression:  1. Hyperkalemia    2. ESRD (end stage renal disease) ([facility]    3. Dialysis patient [facility])             Comment: Please note this report has been produced using speech recognition software and may contain errors related to that system including errors in grammar, punctuation, and spelling, as well as words and phrases that may be inappropriate. Efforts were made to edit the dictations.         Audie Ashley MD  02/25/23 2051

## 2023-02-27 ENCOUNTER — APPOINTMENT (OUTPATIENT)
Dept: INTERVENTIONAL RADIOLOGY/VASCULAR | Age: 73
DRG: 640 | End: 2023-02-27
Payer: COMMERCIAL

## 2023-02-27 PROBLEM — J96.11 CHRONIC RESPIRATORY FAILURE WITH HYPOXIA (HCC): Status: ACTIVE | Noted: 2023-02-27

## 2023-02-27 PROBLEM — J96.10 CHRONIC RESPIRATORY FAILURE, UNSP W HYPOXIA OR HYPERCAPNIA (HCC): Status: ACTIVE | Noted: 2023-02-27

## 2023-02-27 PROBLEM — N18.6 ESRD (END STAGE RENAL DISEASE) (HCC): Status: ACTIVE | Noted: 2023-02-27

## 2023-02-27 PROBLEM — Z99.2 DIALYSIS PATIENT (HCC): Status: ACTIVE | Noted: 2023-02-27

## 2023-02-27 PROBLEM — D64.9 CHRONIC ANEMIA: Status: ACTIVE | Noted: 2023-02-27

## 2023-02-27 LAB
ANION GAP SERPL CALCULATED.3IONS-SCNC: 11 MMOL/L (ref 3–16)
BUN BLDV-MCNC: 54 MG/DL (ref 7–20)
CALCIUM SERPL-MCNC: 8.8 MG/DL (ref 8.3–10.6)
CHLORIDE BLD-SCNC: 94 MMOL/L (ref 99–110)
CO2: 26 MMOL/L (ref 21–32)
CREAT SERPL-MCNC: 5.4 MG/DL (ref 0.6–1.2)
GFR SERPL CREATININE-BSD FRML MDRD: 8 ML/MIN/{1.73_M2}
GLUCOSE BLD-MCNC: 114 MG/DL (ref 70–99)
GLUCOSE BLD-MCNC: 116 MG/DL (ref 70–99)
GLUCOSE BLD-MCNC: 120 MG/DL (ref 70–99)
GLUCOSE BLD-MCNC: 125 MG/DL (ref 70–99)
GLUCOSE BLD-MCNC: 73 MG/DL (ref 70–99)
GLUCOSE BLD-MCNC: 78 MG/DL (ref 70–99)
GLUCOSE BLD-MCNC: 80 MG/DL (ref 70–99)
GLUCOSE BLD-MCNC: 87 MG/DL (ref 70–99)
PERFORMED ON: ABNORMAL
PERFORMED ON: NORMAL
POTASSIUM SERPL-SCNC: 5.2 MMOL/L (ref 3.5–5.1)
SODIUM BLD-SCNC: 131 MMOL/L (ref 136–145)

## 2023-02-27 PROCEDURE — 6370000000 HC RX 637 (ALT 250 FOR IP): Performed by: INTERNAL MEDICINE

## 2023-02-27 PROCEDURE — 2500000003 HC RX 250 WO HCPCS: Performed by: INTERNAL MEDICINE

## 2023-02-27 PROCEDURE — 99222 1ST HOSP IP/OBS MODERATE 55: CPT | Performed by: INTERNAL MEDICINE

## 2023-02-27 PROCEDURE — 80048 BASIC METABOLIC PNL TOTAL CA: CPT

## 2023-02-27 PROCEDURE — C1751 CATH, INF, PER/CENT/MIDLINE: HCPCS

## 2023-02-27 PROCEDURE — 6360000002 HC RX W HCPCS: Performed by: INTERNAL MEDICINE

## 2023-02-27 PROCEDURE — 76937 US GUIDE VASCULAR ACCESS: CPT

## 2023-02-27 PROCEDURE — 2580000003 HC RX 258: Performed by: STUDENT IN AN ORGANIZED HEALTH CARE EDUCATION/TRAINING PROGRAM

## 2023-02-27 PROCEDURE — 5A1D70Z PERFORMANCE OF URINARY FILTRATION, INTERMITTENT, LESS THAN 6 HOURS PER DAY: ICD-10-PCS | Performed by: INTERNAL MEDICINE

## 2023-02-27 PROCEDURE — 36410 VNPNXR 3YR/> PHY/QHP DX/THER: CPT

## 2023-02-27 PROCEDURE — 2700000000 HC OXYGEN THERAPY PER DAY

## 2023-02-27 PROCEDURE — 2060000000 HC ICU INTERMEDIATE R&B

## 2023-02-27 PROCEDURE — 2580000003 HC RX 258: Performed by: INTERNAL MEDICINE

## 2023-02-27 PROCEDURE — 99232 SBSQ HOSP IP/OBS MODERATE 35: CPT | Performed by: INTERNAL MEDICINE

## 2023-02-27 PROCEDURE — A4216 STERILE WATER/SALINE, 10 ML: HCPCS | Performed by: INTERNAL MEDICINE

## 2023-02-27 PROCEDURE — 90935 HEMODIALYSIS ONE EVALUATION: CPT

## 2023-02-27 PROCEDURE — 94761 N-INVAS EAR/PLS OXIMETRY MLT: CPT

## 2023-02-27 PROCEDURE — 05HF33Z INSERTION OF INFUSION DEVICE INTO LEFT CEPHALIC VEIN, PERCUTANEOUS APPROACH: ICD-10-PCS | Performed by: INTERNAL MEDICINE

## 2023-02-27 PROCEDURE — 94003 VENT MGMT INPAT SUBQ DAY: CPT

## 2023-02-27 RX ADMIN — MIDODRINE HYDROCHLORIDE 10 MG: 10 TABLET ORAL at 15:36

## 2023-02-27 RX ADMIN — INSULIN HUMAN 10 UNITS: 100 INJECTION, SOLUTION PARENTERAL at 01:15

## 2023-02-27 RX ADMIN — METOPROLOL TARTRATE 25 MG: 25 TABLET, FILM COATED ORAL at 20:43

## 2023-02-27 RX ADMIN — CALCIUM ACETATE 2001 MG: 667 CAPSULE ORAL at 17:39

## 2023-02-27 RX ADMIN — TRAZODONE HYDROCHLORIDE 50 MG: 50 TABLET ORAL at 20:44

## 2023-02-27 RX ADMIN — MUPIROCIN: 20 OINTMENT TOPICAL at 08:26

## 2023-02-27 RX ADMIN — DEXTROSE MONOHYDRATE 250 ML: 100 INJECTION, SOLUTION INTRAVENOUS at 01:18

## 2023-02-27 RX ADMIN — APIXABAN 2.5 MG: 5 TABLET, FILM COATED ORAL at 20:42

## 2023-02-27 RX ADMIN — ATORVASTATIN CALCIUM 40 MG: 40 TABLET, FILM COATED ORAL at 20:43

## 2023-02-27 RX ADMIN — CALCIUM GLUCONATE 1000 MG: 98 INJECTION, SOLUTION INTRAVENOUS at 01:14

## 2023-02-27 RX ADMIN — SERTRALINE HYDROCHLORIDE 50 MG: 50 TABLET ORAL at 08:25

## 2023-02-27 RX ADMIN — GABAPENTIN 100 MG: 100 CAPSULE ORAL at 20:43

## 2023-02-27 RX ADMIN — EPOETIN ALFA-EPBX 10000 UNITS: 10000 INJECTION, SOLUTION INTRAVENOUS; SUBCUTANEOUS at 14:13

## 2023-02-27 RX ADMIN — CALCIUM ACETATE 2001 MG: 667 CAPSULE ORAL at 15:38

## 2023-02-27 RX ADMIN — MIDODRINE HYDROCHLORIDE 10 MG: 10 TABLET ORAL at 17:39

## 2023-02-27 RX ADMIN — GABAPENTIN 100 MG: 100 CAPSULE ORAL at 15:39

## 2023-02-27 RX ADMIN — SODIUM CHLORIDE, PRESERVATIVE FREE 10 ML: 5 INJECTION INTRAVENOUS at 20:42

## 2023-02-27 RX ADMIN — GABAPENTIN 100 MG: 100 CAPSULE ORAL at 08:27

## 2023-02-27 RX ADMIN — SODIUM CHLORIDE, PRESERVATIVE FREE 10 ML: 5 INJECTION INTRAVENOUS at 08:27

## 2023-02-27 RX ADMIN — MIDODRINE HYDROCHLORIDE 10 MG: 10 TABLET ORAL at 08:25

## 2023-02-27 RX ADMIN — CALCIUM ACETATE 2001 MG: 667 CAPSULE ORAL at 08:25

## 2023-02-27 RX ADMIN — APIXABAN 2.5 MG: 5 TABLET, FILM COATED ORAL at 08:25

## 2023-02-27 RX ADMIN — SODIUM BICARBONATE 50 MEQ: 84 INJECTION, SOLUTION INTRAVENOUS at 01:13

## 2023-02-27 RX ADMIN — FAMOTIDINE 20 MG: 10 INJECTION INTRAVENOUS at 08:26

## 2023-02-27 ASSESSMENT — PULMONARY FUNCTION TESTS
PIF_VALUE: 28
PIF_VALUE: 27
PIF_VALUE: 27
PIF_VALUE: 28
PIF_VALUE: 26
PIF_VALUE: 33
PIF_VALUE: 32
PIF_VALUE: 26
PIF_VALUE: 21

## 2023-02-27 ASSESSMENT — PAIN SCALES - GENERAL
PAINLEVEL_OUTOF10: 0
PAINLEVEL_OUTOF10: 0

## 2023-02-27 NOTE — PROGRESS NOTES
02/26/23 1944   Patient Observation   Heart Rate 69   Resp (!) 9   Observations shiley 6   Breath Sounds   Right Upper Lobe Diminished   Right Middle Lobe Diminished   Right Lower Lobe Diminished   Left Upper Lobe Diminished   Left Lower Lobe Diminished   Vent Information   Vent Mode AC/VC   Ventilator Settings   Vt (Set, mL) 400 mL   Resp Rate (Set) 20 bmp   PEEP/CPAP (cmH2O) 8   FiO2  50 %   Pressure Support (cm H2O) 0 cm H2O   Vent Patient Data (Readings)   Vt (Measured) 527 mL   Peak Inspiratory Pressure (cmH2O) 30 cmH2O   Rate Measured 20 br/min   Minute Volume (L/min) 9.18 Liters   Mean Airway Pressure (cmH2O) 15 cmH20   Plateau Pressure (cm H2O) 23 cm H2O   Driving Pressure 15   I:E Ratio 1:2.40   Flow Sensitivity 3 L/min   Static Compliance (L/cm H2O) 29   Dynamic Compliance (L/cm H2O) 21 L/cm H2O   Vent Alarm Settings   High Pressure (cmH2O) 50 cmH2O   Low Minute Volume (lpm) 2.5 L/min   Low Exhaled Vt (ml) 230 mL   RR High (bpm) 50 br/min   Apnea (secs) 20 secs   Additional Respiratoray Assessments   Humidification Source Heated wire   Humidification Temp 36.9   Circuit Condensation Drained   Ambu Bag With Mask At Bedside Yes   Backup Trachs Available (Size) 4.0;6.0   Airway Clearance   Suction Trach   Suction Device Inline suction catheter   Sputum Method Obtained Tracheal   Sputum Amount Scant

## 2023-02-27 NOTE — PROGRESS NOTES
4 Eyes Skin Assessment     The patient is being assess for   Shift Handoff    I agree that 2 RN's have performed a thorough Head to Toe Skin Assessment on the patient. ALL assessment sites listed below have been assessed. Areas assessed by both nurses:   [x]   Head, Face, and Ears   [x]   Shoulders, Back, and Chest, Abdomen  [x]   Arms, Elbows, and Hands   [x]   Coccyx, Sacrum, and Ischium  [x]   Legs, Feet, and Heels            **SHARE this note so that the co-signing nurse is able to place an eSignature**    Co-signer eSignature: Electronically signed by Antione Fuller RN on 2/27/23 at 6:20 PM EST    Does the Patient have Skin Breakdown?   Yes LDA WOUND CARE was Initiated documentation include the Viviana-wound, Wound Assessment, Measurements, Dressing Treatment, Drainage, and Color\",          Stu Prevention initiated:  Yes   Wound Care Orders initiated:  Yes      [id number]  nurse consulted for Pressure Injury (Stage 3,4, Unstageable, DTI, NWPT, Complex wounds)and New or Established Ostomies:  Yes      Primary Nurse eSignature: Electronically signed by Carissa Hawk RN on 2/27/23 at 6:01 PM EST

## 2023-02-27 NOTE — PROGRESS NOTES
Treatment time: 2.5 hours  Net UF: 2000 ml    Pre weight: 134.5 kg   Post weight: 132.5 kg  EDW: 122.5 kg    Access used: RAVF  Access function: good with  ml/min    Medications or blood products given: retacrit 10,000 units    Regular outpatient schedule: [facility]    Summary of response to treatment: Tolerated good. VSS. Cirt Line: Initial Hct: 23.5;   End Profile : B; Refill ( Hct.1 -26.1  subtract Hct 2- 25.7): 0.4 ( no Refill); BV: -10 %    Copy of dialysis treatment record placed in chart, to be scanned into EMR.

## 2023-02-27 NOTE — FLOWSHEET NOTE
02/27/23 1417   Treatment   Time Off 1417   Observations & Evaluations   Level of Consciousness 0   Oriented X 3   Vital Signs   /65   Temp 98 °F (36.7 °C)   Resp 24   Weight 296 lb 8.3 oz (134.5 kg)   Weight Method Bed scale   Percent Weight Change -1.47   Dry Weight 270 lb 1 oz (122.5 kg)   Pain Assessment   Pain Level 0   Post-Hemodialysis Assessment   Post-Treatment Procedures Blood returned; Access bleeding time > 10 minutes   Machine Disinfection Process Acid/Vinegar Clean;Heat Disinfect; Exterior Machine Disinfection   Rinseback Volume (ml) 400 ml   Blood Volume Processed (Liters) 54.1 l/min   Dialyzer Clearance Lightly streaked   Duration of Treatment (minutes) 150 minutes   Hemodialysis Intake (ml) 400 ml   Hemodialysis Output (ml) 2431 ml   NET Removed (ml) 2031   Tolerated Treatment Good

## 2023-02-27 NOTE — PROGRESS NOTES
no  [facility] Progress Note           Subjective/   71y.o. year old female who we are seeing in consultation for ESKD. HPI:  Last had HD on 2/25 for 1 hr & 40 minutes with 2.5 liters removed, post-weight of 142.5 kg. States shortness of breath better, stable on ventilator. ROS:  +weak, no fevers. Objective/   GEN:  Chronically ill, BP (!) 136/104   Pulse 65   Temp 98.2 °F (36.8 °C) (Oral)   Resp 10   Ht 5' 4\" (1.626 m)   Wt (!) 301 lb (136.5 kg)   SpO2 98%   BMI 51.67 kg/m²   HEENT: non-icteric, no JVD  CV: S1, S2 without m/r/g; no LE edema  RESP: CTA B without w/r/r; breathing wnl  ABD: +bs, soft, nt, no hsm  SKIN: warm, no rashes  ACCESS: RUE AVF +bruit/thrill    Data/  Recent Labs     02/25/23  1538 02/26/23  0614   WBC 9.0 7.6   HGB 8.0* 7.4*   HCT 25.5* 24.1*   .5* 103.7*    158     Recent Labs     02/25/23  1538 02/25/23  1710 02/26/23  0614 02/26/23  1658 02/26/23  2243     --  134* 131* 134*   K 5.9*   < > 5.9* 6.4* 6.1*   CL 95*  --  99 96* 95*   CO2 28  --  19* 20* 26   GLUCOSE 168*  --  68* 125* 121*   MG 1.70*  --   --   --   --    BUN 78*  --  75* 78* 77*   CREATININE 7.0*  --  6.3* 6.6* 6.7*   LABGLOM 6*  --  7* 6* 6*    < > = values in this interval not displayed. Assessment/     ESKD. - On HD at [facility]. Last HD prior to admission was on 1/19/23   - Vascular access: RUE AVF. Fistula is working but has arm swelling and likely to have outflow stenosis. Will need to go to the access center. Partial treatment on 2/25     Chronic respiratory failure   S/p trach      Hypotension  No clear infection, after sedation. Monitoring closely for now      Hyperkalemia.  - Potassium level here is high despite partial dialysis, s/p course of lokelma  - ? Some recirculation due to access stenosis/ No EKG changes      Anemia.  - Continue KOKO with HD.   Below target   - On Micera 200 mcg IV [id number]       Plan/     - HD today with UF as tolerated, EDW listed at 122.5 kg, but has been closer to ~142 kg of late, plan to repeat HD tomorrow again to put back on regular schedule & for further UF  - Supplemental KOKO dosing with HD  - Trend labs, bp's    ____________________________________  Nancy Akbar MD  [facility]  [email or web address]  Office: [phone]

## 2023-02-27 NOTE — CARE COORDINATION
Case Management Assessment  Initial Evaluation    Date/Time of Evaluation: 2/27/2023 11:05 AM  Assessment Completed by: Rachana Anne RN    If patient is discharged prior to next notation, then this note serves as note for discharge by case management. Patient Name: Janice Castillo                   YOB: 1950  Diagnosis: Hyperkalemia [E87.5]  ESRD ([facility] disease) ([facility] [N18.6]  Dialysis patient [facility]) [Z99.2]                   Date / Time: 2/25/2023  2:51 PM    Patient Admission Status: Inpatient   Readmission Risk (Low < 19, Mod (19-27), High > 27): Readmission Risk Score: 33    Current PCP: Emily Collazo MD  PCP verified by CM? Yes    Chart Reviewed: Yes      History Provided by: Patient  Patient Orientation: Alert and Oriented, Person, Place, Situation    Patient Cognition: Alert    Hospitalization in the last 30 days (Readmission):  No    If yes, Readmission Assessment in CM Navigator will be completed. Advance Directives:      Code Status: Full Code   Patient's Primary Decision Maker is: Legal Next of Kin    Primary Decision Maker: 7 Ruelisha Mayfield - [phone]    Discharge Planning:    Patient lives with: Other (Comment) (LTC at [facility]) Type of Home: Long-Term Care  Primary Care Giver: Other (Comment) (LTC at [facility])  Patient Support Systems include: Children, Other (Comment) (facility staff)   Current Financial resources: Medicare, Medicaid  Current community resources: None  Current services prior to admission: [facility] (LTC at [facility]. Chronic vent/dialysis. [facility])            Current DME:              Type of Home Care services:  None    ADLS  Prior functional level: Other (see comment) (Dependent for all adl's)  Current functional level:  Other (see comment) (Dependent for all adls)    PT AM-PAC:   /24  OT AM-PAC:   /24    Family can provide assistance at DC: No  Would you like Case Management to discuss the discharge plan with any other family members/significant others, and if so, who? No  Plans to Return to Present Housing: Yes  Other Identified Issues/Barriers to RETURNING to current housing: None   Potential Assistance needed at discharge: Other (Comment) (Planned return to [facility] at discharge)            Potential DME:    Patient expects to discharge to: Long-term care  Plan for transportation at discharge:      Financial    Payor: Janel Lanier / Plan: Emily Duque / Product Type: *No Product type* /     Does insurance require precert for SNF: No    Potential assistance Purchasing Medications: No  Meds-to-Beds request:        [facility], [address] [phone] - F [phone]  [address] [facility] [address]  Phone: [phone] Fax: [phone]      Notes:    Factors facilitating achievement of predicted outcomes: Cooperative and Pleasant    Barriers to discharge: None    Additional Case Management Notes: CM reviewed chart and met with patient at bedside to discuss discharge needs and plan. Patient LTC at [facility]) and plans return at discharge. Discharge planned for 2/28/2023. Chronic ventilator. Receives dialysis at [facility]. Spoke to Kristina at [facility] who states no barriers to return. Will not need Covid test as long as patient returns tomorrow d/t admission testing completed on 02/25/2023 is within 72 hour. The Plan for Transition of Care is related to the following treatment goals of Hyperkalemia [E87.5]  ESRD (end stage renal disease) ([facility] [N18.6]  Dialysis patient ([facility] [E48.9]    IF APPLICABLE: The Patient and/or patient representative Rosalinda Rivero and her family were provided with a choice of provider and agrees with the discharge plan.  Freedom of choice list with basic dialogue that supports the patient's individualized plan of care/goals and shares the quality data associated with the providers was provided to:     Patient Representative Name:       The Patient and/or Patient Representative Agree with the Discharge Plan? Addendum at 1331:    Transport arranged for 2/28 at 1300 via [phone]. Left voicemail for Dodie Ayala at [facility].     Lainey Owen RN  Case Management Department  Ph: [phone]

## 2023-02-27 NOTE — PROGRESS NOTES
08:30 Pt awake a&o x4, Chronic trach vent assessment as charted   10:00 Critical care rounds completed @ bedside w/ Dr. Ganga Youngblood  11:15 Pt to HD unit for treatment , pt awake a&o x4, pt stable @ time of transfer to HD unit   16:00 Pt back from HD tx, pt awake a&o x4,   19:00 Pt remains awake a&o x4, VS remain stable handoff report given to night nurse Epi, pt stable @ handoff ,

## 2023-02-27 NOTE — PROGRESS NOTES
02/26/23 2301   Patient Observation   Heart Rate 68   Resp 20   Observations shiley 6   Breath Sounds   Right Upper Lobe Rhonchi   Right Middle Lobe Diminished   Right Lower Lobe Diminished   Left Upper Lobe Diminished   Left Lower Lobe Diminished   Vent Information   Vent Mode AC/VC   Ventilator Settings   Vt (Set, mL) 400 mL   Resp Rate (Set) 20 bmp   PEEP/CPAP (cmH2O) 8   FiO2  50 %   Pressure Support (cm H2O) 0 cm H2O   Vent Patient Data (Readings)   Vt (Measured) 457 mL   Peak Inspiratory Pressure (cmH2O) 28 cmH2O   Rate Measured 20 br/min   Minute Volume (L/min) 9.14 Liters   Mean Airway Pressure (cmH2O) 14 cmH20   Plateau Pressure (cm H2O) 23 cm H2O   Driving Pressure 15   I:E Ratio 1:2.40   Flow Sensitivity 3 L/min   Vent Alarm Settings   High Pressure (cmH2O) 50 cmH2O   Low Minute Volume (lpm) 2.5 L/min   Low Exhaled Vt (ml) 230 mL   RR High (bpm) 50 br/min   Apnea (secs) 20 secs   Additional Respiratoray Assessments   Humidification Source Heated wire   Humidification Temp 37   Circuit Condensation Drained   Ambu Bag With Mask At Bedside Yes   Backup Trachs Available (Size) 4.0;6.0   Airway Clearance   Suction Trach   Suction Device Inline suction catheter   Sputum Method Obtained Tracheal   Sputum Amount Scant

## 2023-02-27 NOTE — CARE COORDINATION
Patient upgraded to Inpatient status. CM delivered IMM and provided verbal explanation at bedside.      Madeline Romero RN

## 2023-02-27 NOTE — PROGRESS NOTES
Pulmonary & Critical Care Medicine ICU Progress Note    CC: Hyperkalemia after incomplete dialysis    Events of Last 24 hours: Admitted for hyperkalemia    Invasive Lines: 2023 right midline    MV: Chronic  Vent Mode: AC/VC Resp Rate (Set): 20 bmp/Vt (Set, mL): 400 mL/ /FiO2 : 40 %  No results for input(s): PHART, UOM3SLM, PO2ART in the last 72 hours. IV:   sodium chloride      dextrose      dextrose         Vitals:  Blood pressure 106/63, pulse 65, temperature 98.2 °F (36.8 °C), temperature source Oral, resp. rate (!) 4, height 5' 4\" (1.626 m), weight (!) 301 lb (136.5 kg), SpO2 100 %. on vent  Temp  Av.3 °F (36.8 °C)  Min: 98.1 °F (36.7 °C)  Max: 98.6 °F (37 °C)    Intake/Output Summary (Last 24 hours) at 2023 0830  Last data filed at 2023 1653  Gross per 24 hour   Intake 685 ml   Output 0 ml   Net 685 ml     EXAM:  Constitutional:  No acute distress. HENT:  Oropharynx is clear and moist. Chronic tracheostomy   Neck: No tracheal deviation present. Cardiovascular: Normal heart sounds. + lower extremity edema. Pulmonary/Chest: No wheezes. + rhonchi. No rales. No decreased breath sounds. No accessory muscle usage or stridor. Musculoskeletal: No cyanosis. No clubbing. Skin: Skin is warm and dry. Psychiatric: Normal mood and affect.   Neurologic: A&O, can speak with difficulty due to inflated cuff    Scheduled Meds:   traZODone  50 mg Oral Nightly    sertraline  50 mg Oral Daily    metoprolol tartrate  25 mg Oral BID    calcium acetate  2,001 mg Oral TID WC    atorvastatin  40 mg Oral Nightly    apixaban  2.5 mg Oral BID    gabapentin  100 mg Oral TID    midodrine  10 mg Oral TID WC    famotidine (PEPCID) injection  20 mg IntraVENous BID    mupirocin   Nasal BID    lidocaine 1 % injection  5 mL IntraDERmal Once    sodium chloride flush  5-40 mL IntraVENous 2 times per day     PRN Meds:  polyethylene glycol, sodium chloride, ondansetron **OR** ondansetron, acetaminophen **OR** acetaminophen, HYDROcodone 5 mg - acetaminophen, glucose, dextrose bolus **OR** dextrose bolus, glucagon (rDNA), dextrose, OLANZapine, dextrose, [DISCONTINUED] dextrose bolus **OR** dextrose bolus, sodium chloride flush    Results:  CBC:   Recent Labs     02/25/23  1538 02/26/23  0614   WBC 9.0 7.6   HGB 8.0* 7.4*   HCT 25.5* 24.1*   .5* 103.7*    158     BMP:   Recent Labs     02/26/23  0614 02/26/23  1658 02/26/23  2243   * 131* 134*   K 5.9* 6.4* 6.1*   CL 99 96* 95*   CO2 19* 20* 26   BUN 75* 78* 77*   CREATININE 6.3* 6.6* 6.7*     LIVER PROFILE:   Recent Labs     02/25/23  1538   AST 12*   ALT 6*   BILITOT 0.4   ALKPHOS 131*       Cultures:  2/25/2023 SARS-CoV-2 and influenza are negative    Films:  CXR was reviewed by me and it showed   CXR 3/26/2023 pulmonary vascular congestion    ASSESSMENT:  ESKD  Chronic respiratory failure with chronic mechanical ventilation/trach  Persistent hyperkalemia  Paroxysmal atrial fibrillation  DM 2  R arm fistula, R arm swelling  H/O CVA     PLAN:  Plan outpatient evaluation for access stenosis   Mechanical ventilation as per my orders.    HD per nephrology   Prophylaxis: eliquis, pepcid    Likely home tomorrow

## 2023-02-27 NOTE — ACP (ADVANCE CARE PLANNING)
Advance Care Planning     General Advance Care Planning (ACP) Conversation    Date of Conversation: 2/25/2023  Conducted with: Patient with Decision Making Capacity    Healthcare Decision Maker:    Primary Decision Maker: Bridgette Harrison - [phone]  Click here to complete [address] including selection of the Healthcare Decision Maker Relationship (ie \"Primary\"). Today we documented Decision Maker(s) consistent with Legal Next of Kin hierarchy.     Content/Action Overview:  Reviewed DNR/DNI and patient elects Full Code (Attempt Resuscitation)      Length of Voluntary ACP Conversation in minutes:  <16 minutes (Non-Billable)    Carmela Schafer RN

## 2023-02-27 NOTE — PROGRESS NOTES
IM Progress Note    Admit Date:  2/25/2023    ESRD, chronic vent patient from nursing home. Did not complete dialysis yesterday, presented to ER. Noted to have persistent hyperkalemia. Started on [facility] and admitted to ICU. Seen by pulmonologist and nephrologist    Subjective:  Ms. Jami Aponte is currently stable on her chronic vent settings. Awake alert and oriented. No distress. Nephrology is planning on dialysis today    Objective:   /63   Pulse 65   Temp 98.6 °F (37 °C) (Oral)   Resp (!) 4   Ht 5' 4\" (1.626 m)   Wt (!) 301 lb (136.5 kg)   SpO2 100%   BMI 51.67 kg/m²     Intake/Output Summary (Last 24 hours) at 2/27/2023 0744  Last data filed at 2/26/2023 1653  Gross per 24 hour   Intake 685 ml   Output 0 ml   Net 685 ml           Physical Exam:  General:  Awake, alert, NAD  Obese -American female  Chronic vent. Skin:  Warm and dry  Neck:  JVD absent. Neck supple. Tracheostomy present  Chest: Bilateral equal air entry , mild rhonchi. Cardiovascular:  RRR ,S1S2 normal  Abdomen:  Soft, non tender, non distended, BS +  Extremities:  No edema. Intact peripheral pulses.  Brisk cap refill, < 2 secs  Neuro: non focal      Medications:   Scheduled Meds:   traZODone  50 mg Oral Nightly    sertraline  50 mg Oral Daily    metoprolol tartrate  25 mg Oral BID    calcium acetate  2,001 mg Oral TID WC    atorvastatin  40 mg Oral Nightly    apixaban  2.5 mg Oral BID    gabapentin  100 mg Oral TID    midodrine  10 mg Oral TID WC    famotidine (PEPCID) injection  20 mg IntraVENous BID    mupirocin   Nasal BID    lidocaine 1 % injection  5 mL IntraDERmal Once    sodium chloride flush  5-40 mL IntraVENous 2 times per day       Continuous Infusions:   sodium chloride      dextrose      dextrose         Data:  CBC:   Recent Labs     02/25/23  1538 02/26/23  0614   WBC 9.0 7.6   RBC 2.49* 2.33*   HGB 8.0* 7.4*   HCT 25.5* 24.1*   .5* 103.7*   RDW 14.6 15.4    158       BMP:   Recent Labs 02/26/23  0614 02/26/23  1658 02/26/23  2243   * 131* 134*   K 5.9* 6.4* 6.1*   CL 99 96* 95*   CO2 19* 20* 26   BUN 75* 78* 77*   CREATININE 6.3* 6.6* 6.7*       BNP: No results for input(s): BNP in the last 72 hours. PT/INR:   Recent Labs     02/25/23  1545   PROTIME 14.6*   INR 1.16*       APTT:   Recent Labs     02/25/23  1545   APTT 32.5       CARDIAC ENZYMES:   Recent Labs     02/25/23  1538   TROPONINI 0.18*       FASTING LIPID PANEL:  Lab Results   Component Value Date    CHOL 85 09/16/2022    HDL 23 (L) 09/16/2022    TRIG 140 09/16/2022     LIVER PROFILE:   Recent Labs     02/25/23  1538   AST 12*   ALT 6*   BILITOT 0.4   ALKPHOS 131*            Cultures  COVID/Influenza: not detected       Radiology  IR MIDLINE CATH   Final Result      XR CHEST PORTABLE   Preliminary Result   Stable examination with cardiomegaly and mild central pulmonary vascular   congestion. XR CHEST PORTABLE   Final Result   Findings most compatible with pulmonary interstitial edema. Assessment:  Principal Problem:    Hyperkalemia  Active Problems:    Dialysis patient ([facility] [address]    Chronic anemia    Chronic respiratory failure with hypoxia (HCC)    ESRD (end stage renal disease) ([facility] [address]  Resolved Problems:    * No resolved hospital problems. *      Plan:      Hyperkalemia  ESRD, on HD  -Consulted nephrology. -HD today  -S/p dextrose and insulin ;continue Lokelma. Chronic respiratory failure status post trach, on vent  -Seen by pulmonology . patient stable on her chronic vent settings    Paroxysmal atrial fibrillation  -On Eliquis    Elevated troponin level    Anemia of CKD  -Monitor H&H    Major depression  -Continue Zoloft and Ativan as needed    Diabetes mellitus type 2  -Sliding scale insulin    History of CVA  -Continue aspirin, statins    Chronic pain syndrome  -On gabapentin and hydrocodone     DVT Prophylaxis: Eliquis  Diet: ADULT DIET; Regular; Low Potassium (Less than 3000 mg/day);  Low Phosphorus (Less than 1000 mg).   Patient is tolerating diet well  Code Status: Full Code    Transfer to U       Kirk Brunner, MD   2/27/2023 7:44 AM

## 2023-02-28 VITALS
HEIGHT: 64 IN | WEIGHT: 205.91 LBS | RESPIRATION RATE: 20 BRPM | OXYGEN SATURATION: 96 % | TEMPERATURE: 98 F | HEART RATE: 60 BPM | BODY MASS INDEX: 35.15 KG/M2 | SYSTOLIC BLOOD PRESSURE: 128 MMHG | DIASTOLIC BLOOD PRESSURE: 58 MMHG

## 2023-02-28 LAB
ALBUMIN SERPL-MCNC: 3.1 G/DL (ref 3.4–5)
ANION GAP SERPL CALCULATED.3IONS-SCNC: 12 MMOL/L (ref 3–16)
BASOPHILS ABSOLUTE: 0.1 K/UL (ref 0–0.2)
BASOPHILS RELATIVE PERCENT: 1.2 %
BUN BLDV-MCNC: 61 MG/DL (ref 7–20)
CALCIUM SERPL-MCNC: 9 MG/DL (ref 8.3–10.6)
CHLORIDE BLD-SCNC: 92 MMOL/L (ref 99–110)
CO2: 26 MMOL/L (ref 21–32)
CREAT SERPL-MCNC: 5.9 MG/DL (ref 0.6–1.2)
EOSINOPHILS ABSOLUTE: 0.6 K/UL (ref 0–0.6)
EOSINOPHILS RELATIVE PERCENT: 7.3 %
GFR SERPL CREATININE-BSD FRML MDRD: 7 ML/MIN/{1.73_M2}
GLUCOSE BLD-MCNC: 84 MG/DL (ref 70–99)
HCT VFR BLD CALC: 22.5 % (ref 36–48)
HEMOGLOBIN: 7.5 G/DL (ref 12–16)
LYMPHOCYTES ABSOLUTE: 1.3 K/UL (ref 1–5.1)
LYMPHOCYTES RELATIVE PERCENT: 16.9 %
MCH RBC QN AUTO: 32.6 PG (ref 26–34)
MCHC RBC AUTO-ENTMCNC: 33.2 G/DL (ref 31–36)
MCV RBC AUTO: 98 FL (ref 80–100)
MONOCYTES ABSOLUTE: 0.7 K/UL (ref 0–1.3)
MONOCYTES RELATIVE PERCENT: 8.9 %
NEUTROPHILS ABSOLUTE: 5 K/UL (ref 1.7–7.7)
NEUTROPHILS RELATIVE PERCENT: 65.7 %
PDW BLD-RTO: 14.5 % (ref 12.4–15.4)
PHOSPHORUS: 4.8 MG/DL (ref 2.5–4.9)
PLATELET # BLD: 192 K/UL (ref 135–450)
PMV BLD AUTO: 8.5 FL (ref 5–10.5)
POTASSIUM SERPL-SCNC: 5.9 MMOL/L (ref 3.5–5.1)
RBC # BLD: 2.29 M/UL (ref 4–5.2)
SODIUM BLD-SCNC: 130 MMOL/L (ref 136–145)
WBC # BLD: 7.6 K/UL (ref 4–11)

## 2023-02-28 PROCEDURE — 2580000003 HC RX 258: Performed by: INTERNAL MEDICINE

## 2023-02-28 PROCEDURE — 6370000000 HC RX 637 (ALT 250 FOR IP): Performed by: INTERNAL MEDICINE

## 2023-02-28 PROCEDURE — 99232 SBSQ HOSP IP/OBS MODERATE 35: CPT | Performed by: INTERNAL MEDICINE

## 2023-02-28 PROCEDURE — 2700000000 HC OXYGEN THERAPY PER DAY

## 2023-02-28 PROCEDURE — 85025 COMPLETE CBC W/AUTO DIFF WBC: CPT

## 2023-02-28 PROCEDURE — 2500000003 HC RX 250 WO HCPCS: Performed by: INTERNAL MEDICINE

## 2023-02-28 PROCEDURE — 94003 VENT MGMT INPAT SUBQ DAY: CPT

## 2023-02-28 PROCEDURE — 80069 RENAL FUNCTION PANEL: CPT

## 2023-02-28 PROCEDURE — 90935 HEMODIALYSIS ONE EVALUATION: CPT

## 2023-02-28 PROCEDURE — 6360000002 HC RX W HCPCS: Performed by: INTERNAL MEDICINE

## 2023-02-28 PROCEDURE — 99239 HOSP IP/OBS DSCHRG MGMT >30: CPT | Performed by: INTERNAL MEDICINE

## 2023-02-28 PROCEDURE — 94761 N-INVAS EAR/PLS OXIMETRY MLT: CPT

## 2023-02-28 RX ORDER — CARBOXYMETHYLCELLULOSE SODIUM 10 MG/ML
1 GEL OPHTHALMIC EVERY 4 HOURS PRN
Status: DISCONTINUED | OUTPATIENT
Start: 2023-02-28 | End: 2023-02-28 | Stop reason: HOSPADM

## 2023-02-28 RX ORDER — LORAZEPAM 0.5 MG/1
0.5 TABLET ORAL EVERY 12 HOURS PRN
Qty: 4 TABLET | Refills: 0 | Status: SHIPPED | OUTPATIENT
Start: 2023-02-28 | End: 2023-03-02

## 2023-02-28 RX ORDER — OXYCODONE HYDROCHLORIDE 5 MG/1
5 TABLET ORAL 2 TIMES DAILY
Qty: 4 TABLET | Refills: 0 | Status: SHIPPED | OUTPATIENT
Start: 2023-02-28 | End: 2023-03-02

## 2023-02-28 RX ADMIN — SODIUM CHLORIDE, PRESERVATIVE FREE 10 ML: 5 INJECTION INTRAVENOUS at 08:09

## 2023-02-28 RX ADMIN — HYDROCODONE BITARTRATE AND ACETAMINOPHEN 1 TABLET: 5; 325 TABLET ORAL at 10:37

## 2023-02-28 RX ADMIN — CALCIUM ACETATE 2001 MG: 667 CAPSULE ORAL at 08:05

## 2023-02-28 RX ADMIN — APIXABAN 2.5 MG: 5 TABLET, FILM COATED ORAL at 08:05

## 2023-02-28 RX ADMIN — EPOETIN ALFA-EPBX 10000 UNITS: 10000 INJECTION, SOLUTION INTRAVENOUS; SUBCUTANEOUS at 11:50

## 2023-02-28 RX ADMIN — FAMOTIDINE 20 MG: 10 INJECTION INTRAVENOUS at 08:06

## 2023-02-28 RX ADMIN — SERTRALINE HYDROCHLORIDE 50 MG: 50 TABLET ORAL at 08:05

## 2023-02-28 RX ADMIN — GABAPENTIN 100 MG: 100 CAPSULE ORAL at 08:05

## 2023-02-28 RX ADMIN — MIDODRINE HYDROCHLORIDE 10 MG: 10 TABLET ORAL at 08:05

## 2023-02-28 ASSESSMENT — PULMONARY FUNCTION TESTS
PIF_VALUE: 27
PIF_VALUE: 25

## 2023-02-28 ASSESSMENT — PAIN SCALES - GENERAL: PAINLEVEL_OUTOF10: 6

## 2023-02-28 NOTE — DISCHARGE SUMMARY
Name:  Zenon Menezes  Room:  3011/3011-01  MRN:    [id number]    Discharge Summary      This discharge summary is in conjunction with a complete physical exam done on the day of discharge. Discharging Physician: Artie Lin MD      Admit: 2/25/2023  Discharge:  2/28/2023     Diagnoses this Admission    Principal Problem:    Hyperkalemia  Active Problems:    Dialysis patient [facility])    Chronic anemia    Chronic respiratory failure with hypoxia (HCC)    ESRD (end stage renal disease) (HCC)    Chronic respiratory failure, unsp w hypoxia or hypercapnia (HCC)  Resolved Problems:    * No resolved hospital problems. *          Procedures (Please Review Full Report for Details)      Consults    IP CONSULT TO HOSPITALIST  IP CONSULT TO NEPHROLOGY  IP CONSULT TO SOCIAL WORK  IP CONSULT TO PULMONOLOGY      HPI: 71 y.o. female with multiple medical issues who resides at the [facility]. She was sent to the emergency department because of elevated potassium levels. Patient is a trached patient has on a vent. She apparently had hyperkalemia and unfortunately did not complete her dialysis session today. She said she stopped dialysis because she was having pelvic floor pain. Potassium was 6.2 postdialysis on she was sent to the hospital for evaluation. Repeat potassium in the emergency department today was 6.4, there were no EKG changes. Nephrology was consulted who recommended treating her hyperkalemia medically and admitting the patient for dialysis in the a.m. Patient denies any other issues at this time. Hospital Course    Hyperkalemia  ESRD, on HD  -Consulted nephrology. -HD yesterday and again today  -S/p dextrose and insulin ;continue Lokelma. Chronic respiratory failure status post trach, on vent  -Seen by pulmonology .    patient stable on her chronic vent settings     Paroxysmal atrial fibrillation  -On Eliquis     Elevated troponin level     Anemia of CKD  -Monitor H&H     Major depression  -Continue Zoloft and Ativan as needed     Diabetes mellitus type 2  -Sliding scale insulin     History of CVA  -Continue aspirin, statins     Chronic pain syndrome  -On gabapentin and hydrocodone      IR MIDLINE CATH   Final Result      XR CHEST PORTABLE   Final Result   Stable examination with cardiomegaly and mild central pulmonary vascular   congestion. XR CHEST PORTABLE   Final Result   Findings most compatible with pulmonary interstitial edema. Discharge Medications     Medication List        CHANGE how you take these medications      LORazepam 0.5 MG tablet  Commonly known as: ATIVAN  Take 1 tablet by mouth every 12 hours as needed for Anxiety for up to 2 days. For anxiety Max Daily Amount: 1 mg  What changed: Another medication with the same name was removed. Continue taking this medication, and follow the directions you see here.             CONTINUE taking these medications      acetaminophen 325 MG tablet  Commonly known as: TYLENOL     apixaban 2.5 MG Tabs tablet  Commonly known as: ELIQUIS     atorvastatin 40 MG tablet  Commonly known as: LIPITOR  Take 1 tablet by mouth nightly     BISACODYL RE     calcium acetate 667 MG Tabs     carboxymethylcellulose PF 1 % Gel ophthalmic gel  Commonly known as: THERATEARS  Place 1 drop into both eyes every 4 hours as needed for Dry Eyes     chlorhexidine 0.12 % solution  Commonly known as: PERIDEX     diphenhydrAMINE 25 MG capsule  Commonly known as: BENADRYL     DULCOLAX PO     gabapentin 300 MG capsule  Commonly known as: NEURONTIN     hydrocortisone 1 % cream     hydrocortisone 2.5 % Crea rectal cream  Commonly known as: ANUSOL-HC  Place rectally 2 times daily     ipratropium-albuterol 0.5-2.5 (3) MG/3ML Soln nebulizer solution  Commonly known as: DUONEB     loperamide 2 MG capsule  Commonly known as: IMODIUM     metoprolol tartrate 25 MG tablet  Commonly known as: LOPRESSOR  Take 1 tablet by mouth 2 times daily Hold for SBP less than 110     midodrine 5 MG tablet  Commonly known as: PROAMATINE     omeprazole 20 MG delayed release capsule  Commonly known as: PRILOSEC     oxyCODONE 5 MG immediate release tablet  Commonly known as: ROXICODONE  Take 1 tablet by mouth in the morning and 1 tablet in the evening. Do all this for 2 days. Max Daily Amount: 10 mg.     polyethylene glycol 17 g packet  Commonly known as: GLYCOLAX     promethazine 12.5 MG tablet  Commonly known as: PHENERGAN     sennosides-docusate sodium 8.6-50 MG tablet  Commonly known as: SENOKOT-S     sertraline 50 MG tablet  Commonly known as: ZOLOFT     sodium phosphate 7-19 GM/118ML     traZODone 50 MG tablet  Commonly known as: DESYREL     UNABLE TO FIND     vitamin B-12 500 MCG tablet  Commonly known as: CYANOCOBALAMIN            STOP taking these medications      furosemide 20 MG tablet  Commonly known as: LASIX     warfarin 3 MG tablet  Commonly known as: COUMADIN               Where to Get Your Medications        You can get these medications from any pharmacy    Bring a paper prescription for each of these medications  LORazepam 0.5 MG tablet  oxyCODONE 5 MG immediate release tablet           Discharge Condition/Location: Stable to SNF    Follow Up: Follow up with PCP.     Total time spent on discharge is 35 minutes      Marino Morin MD 2/28/2023 11:54 AM

## 2023-02-28 NOTE — FLOWSHEET NOTE
BP (!) 113/99   Pulse 75   Temp 98.2 °F (36.8 °C) (Oral)   Resp 20   Ht 5' 4\" (1.626 m)   Wt 296 lb 8.3 oz (134.5 kg)   SpO2 100%   BMI 50.90 kg/m²     Shift assessment complete, see flow sheet for details. Patient receiving mechanical ventilation via tracheostomy. Ventilator settings as follows 20 / 400 / 40% / 5 PEEP  Lung sounds diminished with expiratory wheezing, SpO2 100%. Midline to left upper arm saline locked. Dressing clean, dry, and intact. Patient AOx4 resting in bed watching television. All lines and monitoring devices remain in place. No further needs assessed at this time. Call light within reach, bed locked and in lowest position.

## 2023-02-28 NOTE — PROGRESS NOTES
02/28/23 0344   Patient Observation   Heart Rate (!) 44   SpO2 100 %   Breath Sounds   Right Upper Lobe Diminished   Right Middle Lobe Diminished   Right Lower Lobe Diminished   Left Upper Lobe Diminished   Left Lower Lobe Diminished   Vent Information   Vent Mode AC/VC   $Ventilation $Subsequent Day   Ventilator Settings   Vt (Set, mL) 400 mL   Resp Rate (Set) 20 bmp   PEEP/CPAP (cmH2O) 5   FiO2  40 %   Vent Patient Data (Readings)   Vt (Measured) 408 mL   Peak Inspiratory Pressure (cmH2O) 27 cmH2O   Rate Measured 20 br/min   Minute Volume (L/min) 8.5 Liters   Mean Airway Pressure (cmH2O) 12 cmH20   I:E Ratio 1:2.4   Flow Sensitivity 3 L/min   Vent Alarm Settings   High Pressure (cmH2O) 50 cmH2O   Low Minute Volume (lpm) 2.5 L/min   Low Exhaled Vt (ml) 230 mL   RR High (bpm) 50 br/min   Apnea (secs) 20 secs   Additional Respiratoray Assessments   Humidification Source Heated wire   Humidification Temp 37   Circuit Condensation Drained   Ambu Bag With Mask At Bedside Yes   Surgical Airway (Trach) Marizol Distal   No placement date or time found. Surgical Airway Type: Tracheostomy  Brand: Marizol  Style: Distal  Size (mm): 6   Status Secured   Site Assessment Clean;Dry   Ties Assessment Clean;Dry; Intact

## 2023-02-28 NOTE — DISCHARGE INSTR - COC
Continuity of Care Form    Patient Name: Jennifer Skinner   :  1950  MRN:  [id number]    Admit date:  2023  Discharge date: 23    Code Status Order: Full Code   Advance Directives:     Admitting Physician:  Shaniqua Shepherd MD  PCP: Bethanie Correa MD    Discharging Nurse: [facility] Unit/Room#: 3011/3011-01  Discharging Unit Phone Number: [phone]    Emergency Contact:   Extended Emergency Contact Information  Primary Emergency Contact: Keisha Aguillon  Home Phone: [phone]  Work Phone: [phone]  Mobile Phone: [phone]  Relation: Child    Past Surgical History:  Past Surgical History:   Procedure Laterality Date    COLECTOMY N/A 5/10/2022    EXPLORATORY LAPAROTOMY, LYSIS OF ADHESIONS performed by Kayden Leon MD at [facility] OR    IR MIDLINE CATH  2023    IR MIDLINE CATH 2023 [facility] SPECIAL PROCEDURES       Immunization History:     There is no immunization history on file for this patient.    Active Problems:  Patient Active Problem List   Diagnosis Code    Unresponsive episode R41.89    Syncope R55    Acute respiratory failure with hypoxia and hypercapnia ([facility]) J96.01, J96.02    Pneumonia due to infectious organism J18.9    ESRD on dialysis ([facility]) N18.6, Z99.2    Chronic respiratory failure requiring continuous mechanical ventilation through tracheostomy ([facility]) J96.10, Z93.0, Z99.11    Acute on chronic respiratory failure with hypoxia ([facility]) J96.21    Acute pulmonary edema ([facility]) J81.0    Rectal pain K62.89    History of CVA (cerebrovascular accident) Z86.73    Hospital-acquired pneumonia J18.9, Y95    Anemia, chronic disease D63.8    Anxiety F41.9    Chronic diastolic heart failure ([facility]) I50.32    Dysphagia, pharyngeal phase R13.13    Primary hypertension I10    GERD (gastroesophageal reflux disease) K21.9    Morbid obesity with BMI of 50.0-59.9, adult ([facility]) E66.01, Z68.43    SILVER (obstructive sleep apnea) G47.33    Abnormal chest x-ray R93.89     Vaginal bleeding N93.9    SBO (small bowel obstruction) ([address] K56.609    Fecal impaction ([facility]) K56.41    Chest pain R07.9    Coronary artery disease involving native heart with unstable angina pectoris ([facility]) I25.110    Mixed hyperlipidemia E78.2    ESRD on hemodialysis ([facility]) N18.6, Z99.2    Tracheostomy dependence ([facility]) Z93.0    Leukocytosis D72.829    Macrocytic anemia D53.9    Acute encephalopathy G93.40    HCAP (healthcare-associated pneumonia) J18.9    Severe sepsis ([facility]) A41.9, R65.20    Mucus plug in respiratory tract T17.998A    Aspiration of foreign body T17.908A    Septic shock ([facility]) A41.9, R65.21    Hyperkalemia E87.5    Dialysis patient [facility]) Z99.2    Chronic anemia D64.9    Chronic respiratory failure with hypoxia ([facility] J96.11    ESRD (end stage renal disease) ([facility]) N18.6    Chronic respiratory failure, unsp w hypoxia or hypercapnia ([facility]) J96.10       Isolation/Infection:   Isolation            Contact          Patient Infection Status       Infection Onset Added Last Indicated Last Indicated By Review Planned Expiration Resolved Resolved By    Tasia auris 05/26/22 10/27/22 10/27/22 Ida Orozco RN        Added from external infection. MDRO (multi-drug resistant organism) 02/17/22 02/20/22 02/17/22 Culture, Respiratory        MRSA 01/22/21 02/17/22 01/20/23 MRSA DNA Probe, Nasal        Added from external infection.     Resolved    COVID-19 (Rule Out) 02/25/23 02/25/23 02/25/23 COVID-19 & Influenza Combo (Ordered)   02/25/23 Rule-Out Test Resulted    COVID-19 (Rule Out) 01/19/23 01/19/23 01/19/23 COVID-19 & Influenza Combo (Ordered)   01/19/23 Rule-Out Test Resulted    COVID-19 (Rule Out) 10/25/22 10/25/22 10/26/22 COVID-19, Rapid (Ordered)   10/26/22 Rule-Out Test Resulted    COVID-19 (Rule Out) 09/19/22 09/19/22 09/19/22 COVID-19, Rapid (Ordered)   09/19/22 Rule-Out Test Resulted    COVID-19 (Rule Out) 09/15/22 09/15/22 09/15/22 COVID-19, Rapid (Ordered)   09/15/22 Rule-Out Test Resulted COVID-19 (Rule Out) 22 COVID-19 & Influenza Combo (Ordered)   22 Juan R Wood RN    COVID-19 (Rule Out) 22 COVID-19, Rapid (Ordered)   22 Rule-Out Test Resulted    C-diff Rule Out 22 Clostridium difficile toxin/antigen (Ordered)   22 Samir Valenzuela RN    Order . COVID-19 (Rule Out) 22 COVID-19 & Influenza Combo (Ordered)   22 Rule-Out Test Resulted    COVID-19 (Rule Out) 01/10/22 01/10/22 01/11/22 COVID-19 & Influenza Combo (Ordered)   22 Rule-Out Test Resulted            Nurse Assessment:  Last Vital Signs: BP (!) 117/54   Pulse 64   Temp 98.2 °F (36.8 °C) (Oral)   Resp 18   Ht 5' 4\" (1.626 m)   Wt 211 lb 8 oz (95.9 kg)   SpO2 100%   BMI 36.30 kg/m²     Last documented pain score (0-10 scale): Pain Level: 0  Last Weight:   Wt Readings from Last 1 Encounters:   23 211 lb 8 oz (95.9 kg)     Mental Status:  oriented    IV Access:  - None    Nursing Mobility/ADLs:  Walking   Dependent  Transfer  Dependent  Bathing  Dependent  Dressing  Dependent  Toileting  Dependent  Feeding  Dependent  Med Admin  Dependent  Med Delivery   crushed    Wound Care Documentation and Therapy:  Negative Pressure Wound Therapy Abdomen Medial (Active)   Number of days: 293       Wound 22 Thigh Proximal;Right;Posterior (Active)   Wound Image   23 0353   Wound Etiology Pressure Stage 1 23 0800   Dressing Status New dressing applied 23   Wound Cleansed Cleansed with saline; Soap and water 23   Dressing/Treatment Open to air 23   Wound Assessment Other (Comment) 23   Drainage Amount Scant 23   Odor None 23   Viviana-wound Assessment Warm; Intact 23   Margins Attached edges 23   Number of days: 307       Wound 22 Thigh Anterior;Right;Medial wound upper inner rigth thigh (Active)   Wound Image 02/26/23 0353   Wound Etiology Pressure Stage 1 02/28/23 0800   Dressing Status Clean;Dry; Intact 02/26/23 0228   Wound Cleansed Soap and water;Irrigated with saline 02/26/23 0228   Dressing/Treatment Foam;Silver dressing 02/26/23 0228   Dressing Change Due 03/01/23 02/26/23 0228   Wound Length (cm) 2 cm 02/26/23 0228   Wound Width (cm) 2 cm 02/26/23 0228   Wound Surface Area (cm^2) 4 cm^2 02/26/23 0228   Wound Assessment Denuded 02/26/23 0228   Drainage Amount Small 02/26/23 0228   Drainage Description Serosanguinous 02/26/23 0228   Odor None 02/26/23 0228   Viviana-wound Assessment Intact 02/26/23 0228   Margins Attached edges 02/26/23 0228   Number of days: 295       Wound 02/26/23 Breast Lateral;Lower;Right open are under right breast (Active)   Wound Image   02/26/23 0115   Wound Etiology Other 02/28/23 0800   Wound Cleansed Soap and water;Irrigated with saline 02/26/23 2030   Dressing/Treatment Foam;Silver dressing 02/28/23 0800   Dressing Change Due 03/01/23 02/28/23 0800   Wound Length (cm) 1 cm 02/26/23 0115   Wound Width (cm) 1 cm 02/26/23 0115   Wound Surface Area (cm^2) 1 cm^2 02/26/23 0115   Distance Tunneling (cm) 0 cm 02/28/23 0800   Wound Assessment Denuded 02/28/23 0800   Drainage Amount Scant 02/28/23 0800   Drainage Description Serosanguinous 02/28/23 0800   Odor None 02/28/23 0800   Viviana-wound Assessment Intact 02/28/23 0800   Margins Attached edges 02/28/23 0800   Number of days: 2       Wound 02/25/23 Back Lateral;Right;Upper skin splits now open (Active)   Wound Image   02/26/23 0115   Wound Etiology Skin Tear 02/28/23 0800   Dressing Status New dressing applied 02/28/23 0800   Wound Cleansed Cleansed with saline 02/28/23 0800   Dressing/Treatment Foam;Silver dressing 02/28/23 0800   Dressing Change Due 03/01/23 02/28/23 0800   Wound Length (cm) 5 cm 02/26/23 0115   Wound Width (cm) 1 cm 02/26/23 0115   Wound Surface Area (cm^2) 5 cm^2 02/26/23 0115   Distance Tunneling (cm) 0 cm 02/28/23 0800 Undermining Maxium Distance (cm) 0 02/28/23 0800   Wound Assessment Denuded 02/28/23 0800   Drainage Amount None 02/28/23 0800   Odor None 02/28/23 0800   Viviana-wound Assessment Intact 02/28/23 0800   Margins Attached edges 02/28/23 0800   Number of days: 3       Incision 05/10/22 Abdomen Medial (Active)   Number of days: 293        Elimination:  Continence: Bowel: Yes  Bladder: Yes  Urinary Catheter: None   Colostomy/Ileostomy/Ileal Conduit: No       Date of Last BM: 02/28/23     Intake/Output Summary (Last 24 hours) at 2/28/2023 0955  Last data filed at 2/27/2023 1759  Gross per 24 hour   Intake 1440 ml   Output 4862 ml   Net -3422 ml     I/O last 3 completed shifts: In: 2040 [P.O.:1240]  Out: 4862     Safety Concerns: At Risk for Falls    Impairments/Disabilities:      Speech    Nutrition Therapy:  Current Nutrition Therapy:   - Oral Diet:  Dysphagia - Soft and Bite Sized, Renal Diet : low potasssium    Routes of Feeding: Oral  Liquids:  Thin Liquids  Daily Fluid Restriction: no  Last Modified Barium Swallow with Video (Video Swallowing Test): not done    Treatments at the Time of Hospital Discharge:   Respiratory Treatments:   Oxygen Therapy:      Ventilator:    - Ventilator Settings:    Vt (Set, mL): 400 mL  Resp Rate (Set): 20 bmp  FiO2 : 40 %    PEEP/CPAP (cmH2O): 5       Rehab Therapies: Speech/Language Therapy  Weight Bearing Status/Restrictions: No weight bearing restrictions  Other Medical Equipment (for information only, NOT a DME order):  wheelchair  Other Treatments:     Patient's personal belongings (please select all that are sent with patient):  Cell phone     RN SIGNATURE:  Electronically signed by Tami Mckeon RN on 2/28/23 at 12:47 PM EST    CASE MANAGEMENT/SOCIAL WORK SECTION    Inpatient Status Date: 2/26/23    Readmission Risk Assessment Score:  Readmission Risk              Risk of Unplanned Readmission:  54           Discharging to Facility/ Agency   Name:   Name: [facility])  [place] [address] [place], [place], [place] [address]  Fax: [phone]   Address:  Phone:  Fax:    Dialysis Facility (if applicable)   Name:  Address:  Dialysis Schedule:  Phone:  Fax:    / signature: Electronically signed by Brianna Levy RN on 2/28/23 at 9:56 AM EST    PHYSICIAN SECTION    Prognosis: Good    Condition at Discharge: Stable    Rehab Potential (if transferring to Rehab): Good    Recommended Labs or Other Treatments After Discharge: none    Physician Certification: I certify the above information and transfer of Bahman Lemos  is necessary for the continuing treatment of the diagnosis listed and that she requires formerly [facility] for greater 30 days.      Update Admission H&P: No change in H&P    PHYSICIAN SIGNATURE:  Electronically signed by Tess Monroe MD on 2/28/23 at 11:53 AM EST

## 2023-02-28 NOTE — CARE COORDINATION
DISCHARGE ORDER  Date/Time 2023 1:02 PM  Completed by: Carrie Ramos, RN, Case Management    Patient Name: Lorie Sy    : 1950      Admit order Date and Status:INPT 23  Noted discharge order. (verify MD's last order for status of admission/Traditional Medicare 3 [place] Inpatient qualifying stay required for SNF)    Confirmed discharge plan with:              Patient:  Yes              When pt confirms DC plan does any support person need to be contacted by CM Yes if yes who__left vmm for pt dtr, Mireya Kolb. ____                      Discharge to Facility:  [facility] phone number for staff giving report: [phone]   Pre-certification completed: [facility] Exemption Notification (HENS) completed: n/a   Discharge orders and Continuity of Care faxed to facility:  facility to obtain from [facility]      Transportation:               Medical Transport explained with choice list offered to pt/family. Choice:(no preference)  Agency used: [employer]   time:   1700      Pt/family/Nursing/Facility aware of  time:  Yes Names: pt, nurse, , facility. Left hippa compliant vmm for pt dtr. Ambulance form completed:  yes:      Date Last IMM Given: 23    Comments:-    Pt is being d/c'd to Critical access hospital today. Pt's O2 sats are chroic trach/vent 100% on 40% FiO2. Discharge timeout done with [facility] RN. All discharge needs and concerns addressed. Discharging nurse to complete AMADEO, reconcile AVS, and place final copy with patient's discharge packet. Discharging RN to ensure that written prescriptions for  Level II medications are sent with patient to the facility as per protocol.

## 2023-02-28 NOTE — PROGRESS NOTES
08:00 Pt awake a&o x4 (can have periods where she is confused or lethargic @ her baseline, hx stroke lives at [facility]) chronic trach ,on vent support. Assessment as charted   10:49 Pt down for HD tx, pt awake a&o x4, pt stable  @ time of transfer fr HD tx  15:10 Pt back from HD tolerated tx well.  Pt awake a&o ,VSS,  reassessment unchanged and as charted  15:57 Report called to nurse Susi Monroe , preparing for d/c back to [facility]  18:20 Pt left via squad to [facility], pt awake a&o pt stabel @ time of d/c

## 2023-02-28 NOTE — PROGRESS NOTES
Physician Progress Note      PATIENT:               Andre Haddad  CSN #:                  [id number]  :                       1950  ADMIT DATE:       2023 2:51 PM  [address] [place] DATE:  RESPONDING  PROVIDER #:        Robby Dial MD          QUERY TEXT:    Patient admitted with hyperkalemia, noted to have atrial fibrillation and is   maintained on Eliquis. If possible, please document in progress notes and   discharge summary if you are evaluating and/or treating any of the following: The medical record reflects the following:  Risk Factors: DM, HTN, hisotry of CVA, female, advanced age  Clinical Indicators: increased risk of clots d/t afib  Treatment: Eliquis, medical management, supportive care    Thank you,  Aba Rojas RN, CDS  [email or web address]  Options provided:  -- Secondary hypercoagulable state in a patient with atrial fibrillation  -- Other - I will add my own diagnosis  -- Disagree - Not applicable / Not valid  -- Disagree - Clinically unable to determine / Unknown  -- Refer to Clinical Documentation Reviewer    PROVIDER RESPONSE TEXT:    This patient has secondary hypercoagulable state in a patient with atrial   fibrillation.     Query created by: Rollene Cranker on 2023 3:41 PM      Electronically signed by:  Robby Dial MD 2023 7:40 AM

## 2023-02-28 NOTE — PROGRESS NOTES
Pulmonary & Critical Care Medicine ICU Progress Note    CC: Hyperkalemia after incomplete dialysis    Events of Last 24 hours:   HD  Some pain in the lower abdomen     Invasive Lines: 2023 right midline    MV: Chronic  Vent Mode: AC/VC Resp Rate (Set): 20 bmp/Vt (Set, mL): 400 mL/ /FiO2 : 40 %  No results for input(s): PHART, IXL8RTK, PO2ART in the last 72 hours. IV:   sodium chloride      dextrose      dextrose         Vitals:  Blood pressure (!) 140/72, pulse 65, temperature 98 °F (36.7 °C), temperature source Oral, resp. rate (!) 0, height 5' 4\" (1.626 m), weight 211 lb 8 oz (95.9 kg), SpO2 100 %. on vent  Temp  Av.1 °F (36.7 °C)  Min: 97.8 °F (36.6 °C)  Max: 98.3 °F (36.8 °C)    Intake/Output Summary (Last 24 hours) at 2023 0719  Last data filed at 2023 1759  Gross per 24 hour   Intake 2040 ml   Output 4862 ml   Net -2822 ml     EXAM:  Constitutional:  No acute distress. HENT:  Oropharynx is clear and moist. Chronic tracheostomy   Neck: No tracheal deviation present. Cardiovascular: Normal heart sounds. + lower extremity edema. Pulmonary/Chest: No wheezes. + rhonchi. No rales. No decreased breath sounds. No accessory muscle usage or stridor. Musculoskeletal: No cyanosis. No clubbing. Skin: Skin is warm and dry. Psychiatric: Normal mood and affect.   Neurologic: A&O     Scheduled Meds:   epoetin claire-epbx  10,000 Units IntraVENous Q MWF    famotidine (PEPCID) injection  20 mg IntraVENous Daily    traZODone  50 mg Oral Nightly    sertraline  50 mg Oral Daily    metoprolol tartrate  25 mg Oral BID    calcium acetate  2,001 mg Oral TID WC    atorvastatin  40 mg Oral Nightly    apixaban  2.5 mg Oral BID    gabapentin  100 mg Oral TID    midodrine  10 mg Oral TID WC    mupirocin   Nasal BID    lidocaine 1 % injection  5 mL IntraDERmal Once    sodium chloride flush  5-40 mL IntraVENous 2 times per day     PRN Meds:  polyethylene glycol, sodium chloride, ondansetron **OR** ondansetron, acetaminophen **OR** acetaminophen, HYDROcodone 5 mg - acetaminophen, glucose, dextrose bolus **OR** dextrose bolus, glucagon (rDNA), dextrose, OLANZapine, dextrose, [DISCONTINUED] dextrose bolus **OR** dextrose bolus, sodium chloride flush    Results:  CBC:   Recent Labs     02/25/23  1538 02/26/23  0614 02/28/23  0542   WBC 9.0 7.6 7.6   HGB 8.0* 7.4* 7.5*   HCT 25.5* 24.1* 22.5*   .5* 103.7* 98.0    158 192     BMP:   Recent Labs     02/26/23  2243 02/27/23  1635 02/28/23  0542   * 131* 130*   K 6.1* 5.2* 5.9*   CL 95* 94* 92*   CO2 26 26 26   PHOS  --   --  4.8   BUN 77* 54* 61*   CREATININE 6.7* 5.4* 5.9*     LIVER PROFILE:   Recent Labs     02/25/23  1538   AST 12*   ALT 6*   BILITOT 0.4   ALKPHOS 131*       Cultures:  2/25/2023 SARS-CoV-2 and influenza are negative    Films:  CXR was reviewed by me and it showed   CXR 3/26/2023 pulmonary vascular congestion    ASSESSMENT:  ESKD  Chronic respiratory failure with chronic mechanical ventilation/trach  Persistent hyperkalemia  Paroxysmal atrial fibrillation  DM 2  R arm fistula, R arm swelling  H/O CVA     PLAN:  Plan outpatient evaluation for access stenosis per nephrology  Mechanical ventilation as per my orders.    HD again today per nephrology   Prophylaxis: eliquis, pepcid    Likely home today after dialysis

## 2023-02-28 NOTE — PROGRESS NOTES
02/27/23 1957   Patient Observation   Heart Rate 74   Breath Sounds   Right Upper Lobe Diminished   Right Middle Lobe Diminished   Right Lower Lobe Diminished   Left Upper Lobe Diminished   Left Lower Lobe Diminished   Vent Information   Vent Mode AC/VC   Ventilator Settings   Vt (Set, mL) 400 mL   Resp Rate (Set) 20 bmp   PEEP/CPAP (cmH2O) 5   FiO2  40 %   Vent Patient Data (Readings)   Vt (Measured) 421 mL   Peak Inspiratory Pressure (cmH2O) 27 cmH2O   Rate Measured 20 br/min   Minute Volume (L/min) 8.4 Liters   Mean Airway Pressure (cmH2O) 12 cmH20   I:E Ratio 1:2.4   Flow Sensitivity 3 L/min   Vent Alarm Settings   High Pressure (cmH2O) 50 cmH2O   Low Minute Volume (lpm) 2.5 L/min   Low Exhaled Vt (ml) 230 mL   High Exhaled Vt (ml) 0 mL   RR High (bpm) 50 br/min   Apnea (secs) 20 secs   Additional Respiratoray Assessments   Humidification Source Heated wire   Humidification Temp 36.9   Circuit Condensation Drained   Ambu Bag With Mask At Bedside Yes   Airway Clearance   Suction Trach   Subglottic Suction Done No   Suction Device Inline suction catheter   Sputum Method Obtained Tracheal   Sputum Amount Scant   Surgical Airway (Trach) Shiley Distal   No placement date or time found. Surgical Airway Type: Tracheostomy  Brand: Shiley  Style: Distal  Size (mm): 6   Status Secured   Site Assessment Clean;Dry   Ties Assessment Clean;Dry; Intact

## 2023-02-28 NOTE — PROGRESS NOTES
no  [facility] Progress Note           Subjective/   71y.o. year old female who we are seeing in consultation for ESKD. HPI:  Last had HD on 2/27 for 2.5 hours with 2 liters removed, post-weight of 132.5 kg. Seen on dialysis. Orders confirmed. States shortness of breath better, stable on ventilator. ROS:  +weak, no fevers. Objective/   GEN:  Chronically ill, BP (!) 140/72   Pulse 65   Temp 98 °F (36.7 °C) (Oral)   Resp (!) 0   Ht 5' 4\" (1.626 m)   Wt 211 lb 8 oz (95.9 kg)   SpO2 100%   BMI 36.30 kg/m²   HEENT: non-icteric, no JVD  CV: S1, S2 without m/r/g; no LE edema  RESP: CTA B without w/r/r; breathing wnl  ABD: +bs, soft, nt, no hsm  SKIN: warm, no rashes  ACCESS: RUE AVF in use    Data/  Recent Labs     02/25/23  1538 02/26/23  0614 02/28/23  0542   WBC 9.0 7.6 7.6   HGB 8.0* 7.4* 7.5*   HCT 25.5* 24.1* 22.5*   .5* 103.7* 98.0    158 192       Recent Labs     02/25/23  1538 02/25/23  1710 02/26/23  2243 02/27/23  1635 02/28/23  0542      < > 134* 131* 130*   K 5.9*   < > 6.1* 5.2* 5.9*   CL 95*   < > 95* 94* 92*   CO2 28   < > 26 26 26   GLUCOSE 168*   < > 121* 114* 84   PHOS  --   --   --   --  4.8   MG 1.70*  --   --   --   --    BUN 78*   < > 77* 54* 61*   CREATININE 7.0*   < > 6.7* 5.4* 5.9*   LABGLOM 6*   < > 6* 8* 7*    < > = values in this interval not displayed. Assessment/     ESKD. - On HD at [facility]. Last HD prior to admission was on 1/19/23   - Vascular access: RUE AVF. Fistula is working but has arm swelling and likely to have outflow stenosis. Will need to go to the access center. Partial treatment on 2/25     Chronic respiratory failure   S/p trach      Hypotension  No clear infection, after sedation. Monitoring closely for now      Hyperkalemia.  - Potassium level here is high despite partial dialysis, s/p course of lokelma  - ? Some recirculation due to access stenosis/ No EKG changes      Anemia.   - Continue KOKO with HD. Below target   - On Micera 200 mcg IV [id number]       Plan/     - HD today with UF as tolerated, EDW listed at 122.5 kg, but has been closer to ~142 kg of late  - Supplemental KOKO dosing with HD  - Trend labs, bp's    Okay for discharge after HD today    ____________________________________  Manda Mabry MD  [facility]  [email or web address]  Office: [phone]

## 2023-02-28 NOTE — PROGRESS NOTES
02/27/23 2336   Breath Sounds   Right Upper Lobe Diminished   Right Middle Lobe Diminished   Right Lower Lobe Diminished   Left Upper Lobe Diminished   Left Lower Lobe Diminished   Vent Information   Vent Mode AC/VC   Ventilator Settings   Vt (Set, mL) 400 mL   Resp Rate (Set) 20 bmp   PEEP/CPAP (cmH2O) 5   FiO2  40 %   Vent Patient Data (Readings)   Vt (Measured) 408 mL   Peak Inspiratory Pressure (cmH2O) 26 cmH2O   Rate Measured 20 br/min   Minute Volume (L/min) 8 Liters   Mean Airway Pressure (cmH2O) 10 cmH20   I:E Ratio 1:2.4   Flow Sensitivity 3 L/min   Vent Alarm Settings   High Pressure (cmH2O) 50 cmH2O   Low Minute Volume (lpm) 2.5 L/min   Low Exhaled Vt (ml) 230 mL   RR High (bpm) 50 br/min   Apnea (secs) 20 secs   Additional Respiratoray Assessments   Humidification Source Heated wire   Humidification Temp 36.9   Circuit Condensation Drained   Ambu Bag With Mask At Bedside Yes   Surgical Airway (Trach) Marizol Distal   No placement date or time found. Surgical Airway Type: Tracheostomy  Brand: Marizol  Style: Distal  Size (mm): 6   Status Secured   Site Assessment Clean;Dry   Ties Assessment Clean;Dry; Intact

## 2023-02-28 NOTE — PROGRESS NOTES
IM Progress Note    Admit Date:  2/25/2023    ESRD, chronic vent patient from nursing home. Did not complete dialysis yesterday, presented to ER. Noted to have persistent hyperkalemia. Started on MALGORZATA SINGH [facility] and admitted to ICU. Seen by pulmonologist and nephrologist    Subjective:  Ms. Franklin Sellers is currently stable on her chronic vent settings. Awake alert and oriented. No distress. Nephrology is planning on dialysis again today due to persistent hyperkalemia    Objective:   BP (!) 140/72   Pulse 65   Temp 98 °F (36.7 °C) (Oral)   Resp (!) 0   Ht 5' 4\" (1.626 m)   Wt 211 lb 8 oz (95.9 kg)   SpO2 100%   BMI 36.30 kg/m²     Intake/Output Summary (Last 24 hours) at 2/28/2023 0740  Last data filed at 2/27/2023 1759  Gross per 24 hour   Intake 2040 ml   Output 4862 ml   Net -2822 ml           Physical Exam:  General:  Awake, alert, NAD  Obese -American female  Chronic vent. Skin:  Warm and dry  Neck:  JVD absent. Neck supple. Tracheostomy present  Chest: Bilateral equal air entry , mild rhonchi. Cardiovascular:  RRR ,S1S2 normal  Abdomen:  Soft, non tender, non distended, BS +  Extremities:  No edema. Intact peripheral pulses.  Brisk cap refill, < 2 secs  Neuro: non focal      Medications:   Scheduled Meds:   epoetin claire-epbx  10,000 Units IntraVENous Q MWF    famotidine (PEPCID) injection  20 mg IntraVENous Daily    traZODone  50 mg Oral Nightly    sertraline  50 mg Oral Daily    metoprolol tartrate  25 mg Oral BID    calcium acetate  2,001 mg Oral TID WC    atorvastatin  40 mg Oral Nightly    apixaban  2.5 mg Oral BID    gabapentin  100 mg Oral TID    midodrine  10 mg Oral TID WC    mupirocin   Nasal BID    lidocaine 1 % injection  5 mL IntraDERmal Once    sodium chloride flush  5-40 mL IntraVENous 2 times per day       Continuous Infusions:   sodium chloride      dextrose      dextrose         Data:  CBC:   Recent Labs     02/25/23  1538 02/26/23  0614 02/28/23  0542   WBC 9.0 7.6 7.6   RBC 2.49* 2.33* 2.29*   HGB 8.0* 7.4* 7.5*   HCT 25.5* 24.1* 22.5*   .5* 103.7* 98.0   RDW 14.6 15.4 14.5    158 192       BMP:   Recent Labs     02/26/23  2243 02/27/23  1635 02/28/23  0542   * 131* 130*   K 6.1* 5.2* 5.9*   CL 95* 94* 92*   CO2 26 26 26   PHOS  --   --  4.8   BUN 77* 54* 61*   CREATININE 6.7* 5.4* 5.9*       BNP: No results for input(s): BNP in the last 72 hours.  PT/INR:   Recent Labs     02/25/23  1545   PROTIME 14.6*   INR 1.16*       APTT:   Recent Labs     02/25/23  1545   APTT 32.5       CARDIAC ENZYMES:   Recent Labs     02/25/23  1538   TROPONINI 0.18*       FASTING LIPID PANEL:  Lab Results   Component Value Date    CHOL 85 09/16/2022    HDL 23 (L) 09/16/2022    TRIG 140 09/16/2022     LIVER PROFILE:   Recent Labs     02/25/23  1538   AST 12*   ALT 6*   BILITOT 0.4   ALKPHOS 131*            Cultures  COVID/Influenza: not detected       Radiology  IR MIDLINE CATH   Final Result      XR CHEST PORTABLE   Final Result   Stable examination with cardiomegaly and mild central pulmonary vascular   congestion.         XR CHEST PORTABLE   Final Result   Findings most compatible with pulmonary interstitial edema.               Assessment:  Principal Problem:    Hyperkalemia  Active Problems:    Dialysis patient (HCC)    Chronic anemia    Chronic respiratory failure with hypoxia (HCC)    ESRD (end stage renal disease) (HCC)    Chronic respiratory failure, unsp w hypoxia or hypercapnia (HCC)  Resolved Problems:    * No resolved hospital problems. *      Plan:      Hyperkalemia  ESRD, on HD  -Consulted nephrology.  -HD yesterday and again today  -S/p dextrose and insulin ;continue Lokelma.    Chronic respiratory failure status post trach, on vent  -Seen by pulmonology .   patient stable on her chronic vent settings    Paroxysmal atrial fibrillation  -On Eliquis    Elevated troponin level    Anemia of CKD  -Monitor H&H    Major depression  -Continue Zoloft and Ativan as  needed    Diabetes mellitus type 2  -Sliding scale insulin    History of CVA  -Continue aspirin, statins    Chronic pain syndrome  -On gabapentin and hydrocodone     DVT Prophylaxis: Eliquis  Diet: ADULT DIET; Regular; Low Potassium (Less than 3000 mg/day); Low Phosphorus (Less than 1000 mg).   Patient is tolerating diet well  Code Status: Full Code    Transfer to PCU       Roxanne Antunez MD   2/28/2023 7:40 AM

## 2023-02-28 NOTE — FLOWSHEET NOTE
02/28/23 1113 02/28/23 1423   Vital Signs   BP (!) 159/71 (!) 150/68   Temp 99.1 °F (37.3 °C)  --    Heart Rate 66  --    Resp 17  --      Treatment time: 3.5 hours ordered. See summary below. Net UF: 2560 ml    Pre weight: 95.9 kg (bed scale)  Post weight: 93.4 kg (bed scale)  EDW: 122.5 kg    Access used: NELSY AVF  Access function: No problems    Medications or blood products given: Retacrit 95324 units IVP    Regular outpatient schedule: Do ARREOLA    Summary of response to treatment: Pulled out venous needle purposefully. Tx stopped  Dr. Carnes Enter notified. Actual tx time 2:53, RTD 37 minutes. Copy of dialysis treatment record placed in chart, to be scanned into EMR.      Report given to Mariana Ramos RN

## 2023-02-28 NOTE — PROGRESS NOTES
FiO2 decraesed to 30%         02/28/23 1513   Breath Sounds   Right Upper Lobe Diminished   Right Middle Lobe Diminished   Right Lower Lobe Diminished   Left Upper Lobe Diminished   Left Lower Lobe Diminished   Ventilator Settings   Vt (Set, mL) 400 mL   Resp Rate (Set) 20 bmp   PEEP/CPAP (cmH2O) 5   FiO2  30 %   Vent Patient Data (Readings)   Vt (Measured) 417 mL   Peak Inspiratory Pressure (cmH2O) 25 cmH2O   Rate Measured 20 br/min   Minute Volume (L/min) 8.3 Liters   Mean Airway Pressure (cmH2O) 11 cmH20   I:E Ratio 1:2.5   Vent Alarm Settings   High Pressure (cmH2O) 40 cmH2O   Low Minute Volume (lpm) 3 L/min   RR High (bpm) 40 br/min   Apnea (secs) 20 secs   Additional Respiratoray Assessments   Humidification Source Heated wire   Humidification Temp 37   Surgical Airway (Trach) Shiley Distal   No placement date or time found.    Surgical Airway Type: Tracheostomy  Brand: Shiley  Style: Distal  Size (mm): 6   Status Secured   Site Assessment Clean;Dry   Skin Assessment   Skin Assessment Clean, dry, & intact

## 2023-03-17 ENCOUNTER — HOSPITAL ENCOUNTER (EMERGENCY)
Age: 73
Discharge: NURSING, REHAB OR LONG-TERM CARE | DRG: 871 | End: 2023-03-17
Attending: EMERGENCY MEDICINE
Payer: COMMERCIAL

## 2023-03-17 ENCOUNTER — APPOINTMENT (OUTPATIENT)
Dept: GENERAL RADIOLOGY | Age: 73
DRG: 871 | End: 2023-03-17
Payer: COMMERCIAL

## 2023-03-17 VITALS
DIASTOLIC BLOOD PRESSURE: 63 MMHG | OXYGEN SATURATION: 96 % | SYSTOLIC BLOOD PRESSURE: 117 MMHG | HEART RATE: 66 BPM | TEMPERATURE: 99.3 F | WEIGHT: 293 LBS | BODY MASS INDEX: 54.21 KG/M2 | RESPIRATION RATE: 21 BRPM

## 2023-03-17 DIAGNOSIS — N18.6 ESRD (END STAGE RENAL DISEASE) (HCC): ICD-10-CM

## 2023-03-17 DIAGNOSIS — I95.9 HYPOTENSION, UNSPECIFIED HYPOTENSION TYPE: Primary | ICD-10-CM

## 2023-03-17 LAB
ALBUMIN SERPL-MCNC: 3.4 G/DL (ref 3.4–5)
ALBUMIN/GLOB SERPL: 1 {RATIO} (ref 1.1–2.2)
ALP SERPL-CCNC: 77 U/L (ref 40–129)
ALT SERPL-CCNC: 9 U/L (ref 10–40)
ANION GAP SERPL CALCULATED.3IONS-SCNC: 16 MMOL/L (ref 3–16)
APTT BLD: 34.6 SEC (ref 23–34.3)
AST SERPL-CCNC: 6 U/L (ref 15–37)
BASE EXCESS BLDV CALC-SCNC: -3.2 MMOL/L (ref -3–3)
BASOPHILS # BLD: 0 K/UL (ref 0–0.2)
BASOPHILS NFR BLD: 0.2 %
BILIRUB SERPL-MCNC: 0.5 MG/DL (ref 0–1)
BUN SERPL-MCNC: 97 MG/DL (ref 7–20)
CALCIUM SERPL-MCNC: 9.3 MG/DL (ref 8.3–10.6)
CHLORIDE SERPL-SCNC: 100 MMOL/L (ref 99–110)
CO2 BLDV-SCNC: 24 MMOL/L
CO2 SERPL-SCNC: 24 MMOL/L (ref 21–32)
COHGB MFR BLDV: 6.5 % (ref 0–1.5)
CREAT SERPL-MCNC: 6.2 MG/DL (ref 0.6–1.2)
DEPRECATED RDW RBC AUTO: 17 % (ref 12.4–15.4)
EKG ATRIAL RATE: 79 BPM
EKG DIAGNOSIS: NORMAL
EKG P AXIS: 57 DEGREES
EKG P-R INTERVAL: 240 MS
EKG Q-T INTERVAL: 374 MS
EKG QRS DURATION: 76 MS
EKG QTC CALCULATION (BAZETT): 428 MS
EKG R AXIS: -3 DEGREES
EKG T AXIS: 7 DEGREES
EKG VENTRICULAR RATE: 79 BPM
EOSINOPHIL # BLD: 0.1 K/UL (ref 0–0.6)
EOSINOPHIL NFR BLD: 0.9 %
GFR SERPLBLD CREATININE-BSD FMLA CKD-EPI: 7 ML/MIN/{1.73_M2}
GLUCOSE SERPL-MCNC: 115 MG/DL (ref 70–99)
HCO3 BLDV-SCNC: 22.7 MMOL/L (ref 23–29)
HCT VFR BLD AUTO: 23.7 % (ref 36–48)
HGB BLD-MCNC: 7.6 G/DL (ref 12–16)
INR PPP: 1.41 (ref 0.87–1.14)
LACTATE BLDV-SCNC: 0.9 MMOL/L (ref 0.4–1.9)
LYMPHOCYTES # BLD: 1.3 K/UL (ref 1–5.1)
LYMPHOCYTES NFR BLD: 12.5 %
MCH RBC QN AUTO: 32.8 PG (ref 26–34)
MCHC RBC AUTO-ENTMCNC: 32.2 G/DL (ref 31–36)
MCV RBC AUTO: 101.8 FL (ref 80–100)
METHGB MFR BLDV: 0.3 %
MONOCYTES # BLD: 0.6 K/UL (ref 0–1.3)
MONOCYTES NFR BLD: 5.3 %
NEUTROPHILS # BLD: 8.6 K/UL (ref 1.7–7.7)
NEUTROPHILS NFR BLD: 81.1 %
O2 THERAPY: ABNORMAL
PCO2 BLDV: 44.7 MMHG (ref 40–50)
PH BLDV: 7.32 [PH] (ref 7.35–7.45)
PLATELET # BLD AUTO: 189 K/UL (ref 135–450)
PMV BLD AUTO: 8.3 FL (ref 5–10.5)
PO2 BLDV: 119.8 MMHG (ref 25–40)
POTASSIUM SERPL-SCNC: 5.5 MMOL/L (ref 3.5–5.1)
PROT SERPL-MCNC: 6.8 G/DL (ref 6.4–8.2)
PROTHROMBIN TIME: 17.1 SEC (ref 11.7–14.5)
RBC # BLD AUTO: 2.33 M/UL (ref 4–5.2)
SAO2 % BLDV: 98 %
SARS-COV-2 RDRP RESP QL NAA+PROBE: NOT DETECTED
SODIUM SERPL-SCNC: 140 MMOL/L (ref 136–145)
TROPONIN T SERPL-MCNC: 0.27 NG/ML
WBC # BLD AUTO: 10.6 K/UL (ref 4–11)

## 2023-03-17 PROCEDURE — 6370000000 HC RX 637 (ALT 250 FOR IP): Performed by: EMERGENCY MEDICINE

## 2023-03-17 PROCEDURE — 36415 COLL VENOUS BLD VENIPUNCTURE: CPT

## 2023-03-17 PROCEDURE — 85610 PROTHROMBIN TIME: CPT

## 2023-03-17 PROCEDURE — 87150 DNA/RNA AMPLIFIED PROBE: CPT

## 2023-03-17 PROCEDURE — 93010 ELECTROCARDIOGRAM REPORT: CPT | Performed by: INTERNAL MEDICINE

## 2023-03-17 PROCEDURE — 93005 ELECTROCARDIOGRAM TRACING: CPT | Performed by: EMERGENCY MEDICINE

## 2023-03-17 PROCEDURE — 71045 X-RAY EXAM CHEST 1 VIEW: CPT

## 2023-03-17 PROCEDURE — 87186 SC STD MICRODIL/AGAR DIL: CPT

## 2023-03-17 PROCEDURE — 87635 SARS-COV-2 COVID-19 AMP PRB: CPT

## 2023-03-17 PROCEDURE — 84484 ASSAY OF TROPONIN QUANT: CPT

## 2023-03-17 PROCEDURE — 85730 THROMBOPLASTIN TIME PARTIAL: CPT

## 2023-03-17 PROCEDURE — 99285 EMERGENCY DEPT VISIT HI MDM: CPT

## 2023-03-17 PROCEDURE — 82803 BLOOD GASES ANY COMBINATION: CPT

## 2023-03-17 PROCEDURE — 85025 COMPLETE CBC W/AUTO DIFF WBC: CPT

## 2023-03-17 PROCEDURE — 83605 ASSAY OF LACTIC ACID: CPT

## 2023-03-17 PROCEDURE — 2580000003 HC RX 258: Performed by: EMERGENCY MEDICINE

## 2023-03-17 PROCEDURE — 80053 COMPREHEN METABOLIC PANEL: CPT

## 2023-03-17 PROCEDURE — 87040 BLOOD CULTURE FOR BACTERIA: CPT

## 2023-03-17 RX ORDER — 0.9 % SODIUM CHLORIDE 0.9 %
1000 INTRAVENOUS SOLUTION INTRAVENOUS ONCE
Status: COMPLETED | OUTPATIENT
Start: 2023-03-17 | End: 2023-03-17

## 2023-03-17 RX ADMIN — SODIUM ZIRCONIUM CYCLOSILICATE 10 G: 10 POWDER, FOR SUSPENSION ORAL at 14:07

## 2023-03-17 RX ADMIN — SODIUM CHLORIDE 1000 ML: 9 INJECTION, SOLUTION INTRAVENOUS at 11:06

## 2023-03-17 ASSESSMENT — PULMONARY FUNCTION TESTS
PIF_VALUE: 37
PIF_VALUE: 34
PIF_VALUE: 35
PIF_VALUE: 33
PIF_VALUE: 41
PIF_VALUE: 30
PIF_VALUE: 40

## 2023-03-17 NOTE — ED NOTES
Writer unable to scan fluids in due to computer scanner not working and no computer on wheels being avail ible.       Tee Brunson, OSMIN  03/17/23 5059

## 2023-03-17 NOTE — LETTER
[facility]  [address] [address]  Phone: [phone]             March 17, 2023    Patient: Albino Bernheim   YOB: 1950   Date of Visit: 3/17/2023       To Whom It May Concern:    Albino Bernheim was seen and treated in our emergency department on 3/17/2023. She may return to work on 03/17/2023.       Sincerely,             Signature:__________________________________

## 2023-03-17 NOTE — ED NOTES
[facility] given update on patient status.       Gomez Chavira RN  03/17/23 [address] Tyrone Hall RN  03/17/23 1033

## 2023-03-17 NOTE — ED PROVIDER NOTES
[address] ED  EMERGENCY DEPARTMENT ENCOUNTER      Pt Name: David Desouza  MRN: [id number]  [place] 1950  Date of evaluation: 3/17/2023  Provider: Dave Glover MD    CHIEF COMPLAINT       Chief Complaint   Patient presents with    Altered Mental Status     Patient comes in from [facility] due to \"being non responsive\" patient's bp reported to be 90/40 per facility. HISTORY OF PRESENT ILLNESS   (Location/Symptom, Timing/Onset, Context/Setting, Quality, Duration, Modifying Factors, Severity)  Note limiting factors. David Desouza is a 71 y.o. female with past medical history of hypertension, CHF, coronary artery disease, chronic respiratory failure with hypoxia status post tracheostomy and chronic ventilator dependence as well as end-stage renal disease on hemodialysis here today for some altered mental status. Patient presents from her local care facility for reports of some change in mental status. Reportedly has been refusing dialysis which is atypical for her. There is also thought that she could be somewhat more sleepy or confused than usual.  Currently, the patient has no complaints. Denies being headache. Denies chest pain or cough. Denies shortness of breath. Denies abdominal pain, vomiting or diarrhea. She states she just did not feel like going to dialysis. Patient is well-known to the emergency department with multiple frequent visits in the past    HPI    Nursing Notes were reviewed. REVIEW OF SYSTEMS    (2-9 systems for level 4, 10 or more for level 5)     Review of Systems    Please see HPI for pertinent positive and negative review of system findings. A full 10 system ROS was performed and otherwise negative.         PAST MEDICAL HISTORY     Past Medical History:   Diagnosis Date    Acute and chronic respiratory failure (acute-on-chronic) (HCC)     Acute blood loss anemia 07/01/2020    Acute deep vein thrombosis (DVT) of brachial vein of left upper extremity ([facility] 02/20/2019    Formatting of this note might be different from the original. Dx February 2019    Anxiety disorder     Atherosclerotic heart disease of native coronary artery without angina pectoris     Bleeding hemorrhoids 06/29/2020    Cerebellar infarct ([facility] 09/29/2019    Cerebral infarction ([facility])     Chronic pain syndrome     Dependence on renal dialysis ([facility]     Dependence on respirator ([facility])     End stage renal disease ([facility]     Gastro-esophageal reflux disease with esophagitis, without bleeding     Insomnia     Major depressive disorder, recurrent ([facility]     Morbid obesity due to excess calories ([facility])     Muscle weakness     Obstructive sleep apnea (adult) (pediatric)     Other hyperlipidemia     Other voice and resonance disorders     Personal history of COVID-19     Pulmonary hypertension ([facility]     Schizo affective schizophrenia ([facility] 02/04/2022    Tracheostomy status ([facility]     Type 2 diabetes mellitus without complications ([facility]     Unspecified atrial fibrillation ([facility]          SURGICAL HISTORY       Past Surgical History:   Procedure Laterality Date    COLECTOMY N/A 5/10/2022    EXPLORATORY LAPAROTOMY, LYSIS OF ADHESIONS performed by Sivan Hewitt MD at [address] CATH  2/27/2023    IR MIDLINE CATH 2/27/2023 [facility] MEDICATIONS       Discharge Medication List as of 3/17/2023 10:44 PM        CONTINUE these medications which have NOT CHANGED    Details   metoprolol tartrate (LOPRESSOR) 25 MG tablet Take 1 tablet by mouth 2 times daily Hold for SBP less than 110, Disp-60 tablet, R-3Print      gabapentin (NEURONTIN) 300 MG capsule Take 300 mg by mouth 3 times daily. Historical Med      sennosides-docusate sodium (SENOKOT-S) 8.6-50 MG tablet Take 2 tablets by mouth at bedtimeHistorical Med      BISACODYL RE Place rectally daily as neededHistorical Med      Magnesium Hydroxide (DULCOLAX PO) Take 10 mg by mouth daily as neededHistorical Med      sodium phosphate (FLEET) 7-19 GM/118ML Place 1 enema rectally once as neededHistorical Med      UNABLE TO FIND Veltessa 8.4 gm by mouth one time a day every Monday, Wednesday, Friday, Sunday for hyperkalemiaHistorical Med      atorvastatin (LIPITOR) 40 MG tablet Take 1 tablet by mouth nightly, Disp-30 tablet, R-3DC to SNF      carboxymethylcellulose PF (THERATEARS) 1 % GEL ophthalmic gel Place 1 drop into both eyes every 4 hours as needed for Dry EyesDC to SNF      apixaban (ELIQUIS) 2.5 MG TABS tablet Take 2.5 mg by mouth 2 times dailyHistorical Med      loperamide (IMODIUM) 2 MG capsule Take 2 mg by mouth 4 times daily as needed for DiarrheaHistorical Med      promethazine (PHENERGAN) 12.5 MG tablet Take 12.5 mg by mouth every 6 hours as needed for NauseaHistorical Med      traZODone (DESYREL) 50 MG tablet Take 50 mg by mouth nightlyHistorical Med      vitamin B-12 (CYANOCOBALAMIN) 500 MCG tablet Take 500 mcg by mouth dailyHistorical Med      hydrocortisone (ANUSOL-HC) 2.5 % CREA rectal cream Place rectally 2 times daily, Rectal, 2 TIMES DAILY Starting u 3/3/2022, Disp-1 each, R-0, Print      calcium acetate 667 MG TABS Take 3 capsules by mouth 3 times daily (with meals)Historical Med      hydrocortisone 1 % cream Apply topically 2 times daily Apply topically 2 times daily. , Topical, 2 TIMES DAILY, Historical Med      acetaminophen (TYLENOL) 325 MG tablet Take 650 mg by mouth every 6 hours as needed for PainHistorical Med      diphenhydrAMINE (BENADRYL) 25 MG capsule Take 25 mg by mouth every 8 hours as needed Historical Med      polyethylene glycol (GLYCOLAX) 17 g packet Take 17 g by mouth daily as needed for ConstipationHistorical Med      ipratropium-albuterol (DUONEB) 0.5-2.5 (3) MG/3ML SOLN nebulizer solution Inhale 1 vial into the lungs every 4 hoursHistorical Med      midodrine (PROAMATINE) 5 MG tablet Take 10 mg by mouth three times a week Tues thurs sat pre dialysis.  Hold for SBP >130Historical Med omeprazole (PRILOSEC) 20 MG delayed release capsule Take 20 mg by mouth dailyHistorical Med      chlorhexidine (PERIDEX) 0.12 % solution Take 15 mLs by mouth 2 times dailyHistorical Med      sertraline (ZOLOFT) 50 MG tablet Take 50 mg by mouth dailyHistorical Med             ALLERGIES     Haloperidol lactate and Ziprasidone hcl    FAMILY HISTORY     No family history on file. SOCIAL HISTORY       Social History     Socioeconomic History    Marital status:    Tobacco Use    Smoking status: Former    Smokeless tobacco: Never   Vaping Use    Vaping Use: Never used   Substance and Sexual Activity    Alcohol use: Never    Drug use: Never    Sexual activity: Not Currently       SCREENINGS    Deputy Coma Scale  Eye Opening: Spontaneous  Best Verbal Response: Confused  Best Motor Response: Localizes pain  Deputy Coma Scale Score: 13          PHYSICAL EXAM    (up to 7 for level 4, 8 or more for level 5)     ED Triage Vitals   BP Temp Temp Source Heart Rate Resp SpO2 Height Weight   03/17/23 1033 03/17/23 1035 03/17/23 1035 03/17/23 1033 03/17/23 1033 03/17/23 1033 -- --   (!) 85/32 99.3 °F (37.4 °C) Oral 80 16 100 %         Physical Exam    General appearance:  Cooperative. Morbidly obese -American female in no acute distress. Skin:  Warm. Dry. Eye:  Extraocular movements intact. Ears, nose, mouth and throat:  Oral mucosa moist,  Neck:  Trachea midline. Tracheostomy patent    Heart:  Regular rate and rhythm  Perfusion:  intact  Respiratory:  Respirations nonlabored. Lungs clear to auscultation bilaterally. Abdominal:   Non distended. Nontender  Neurological:  Alert and oriented x 3.   Moves all extremities spontaneously  Musculoskeletal:   Normal ROM, no deformities          Psychiatric:  Normal mood      DIAGNOSTIC RESULTS       Labs Reviewed   CULTURE, BLOOD 1 - Abnormal; Notable for the following components:       Result Value    Blood Culture, Routine   (*)     Value: Gram stain Aerobic bottle:  Gram positive cocci in chains and/or pairs  resembling Streptococcus  Information to follow      Organism Enterococcus faecalis DNA Detected (*)     All other components within normal limits    Narrative:     ORDER#: [id number]                          ORDERED BY: Destiny Noel  SOURCE: Blood Antecubital-Lef              COLLECTED:  03/17/23 10:55  ANTIBIOTICS AT INOCENTE.:                      RECEIVED :  03/17/23 16:21  If child <=2 yrs old please draw pediatric bottle. ~Blood Culture 1   CULTURE, BLOOD 2 - Abnormal; Notable for the following components:    Culture, Blood 2   (*)     Value: Gram stain Aerobic bottle:  Gram positive cocci  Information to follow  -SANS      All other components within normal limits    Narrative:     ORDER#: [id number]                          ORDERED BY: Destiny Noel  SOURCE: Blood Hand, Right                  COLLECTED:  03/17/23 10:57  ANTIBIOTICS AT INOCENTE.:                      RECEIVED :  03/17/23 16:21  CALL  M[facility]  [facility] tel. [phone],  Previous panic on this admission - call not needed per SOP, 03/18/2023 05:02,  by [facility]  If child <=2 yrs old please draw pediatric bottle. ~Blood Culture #2   CBC WITH AUTO DIFFERENTIAL - Abnormal; Notable for the following components:    RBC 2.33 (*)     Hemoglobin 7.6 (*)     Hematocrit 23.7 (*)     .8 (*)     RDW 17.0 (*)     Neutrophils Absolute 8.6 (*)     All other components within normal limits   COMPREHENSIVE METABOLIC PANEL W/ REFLEX TO MG FOR LOW K - Abnormal; Notable for the following components:    Potassium reflex Magnesium 5.5 (*)     Glucose 115 (*)     BUN 97 (*)     Creatinine 6.2 (*)     Est, Glom Filt Rate 7 (*)     Albumin/Globulin Ratio 1.0 (*)     ALT 9 (*)     AST 6 (*)     All other components within normal limits    Narrative:     CALL  [facility]  [facility] tel. [phone],  Chemistry results called to and read back by Paty Ramsey RN, 03/17/2023  11:35, by [address] [place] [address], VENOUS - Abnormal; Notable for the following components:    pH, Vickey 7.323 (*)     pO2, Vickey 119.8 (*)     HCO3, Venous 22.7 (*)     Base Excess, Vickey -3.2 (*)     Carboxyhemoglobin 6.5 (*)     All other components within normal limits   TROPONIN - Abnormal; Notable for the following components:    Troponin 0.27 (*)     All other components within normal limits    Narrative:     Katie Skinner tel. [phone],  Chemistry results called to and read back by Edward Julian RN, 03/17/2023  11:35, by Marzena Wheeler - Abnormal; Notable for the following components:    Protime 17.1 (*)     INR 1.41 (*)     All other components within normal limits   APTT - Abnormal; Notable for the following components:    aPTT 34.6 (*)     All other components within normal limits   COVID-19, RAPID   CULTURE, BLOOD, PCR ID PANEL RESULTS REPORT    Narrative:     Jenaro RODRIGUEZ tel. [phone],  Microbiology results called to and read back by magaly stallings, 03/18/2023  05:01, by KAPIL   LACTATE, SEPSIS       Interpretation per the Radiologist below, if obtained/available at the time of this note:    XR CHEST PORTABLE   Final Result   Findings similar to prior study. Mild-to-moderate congestion and/or   infiltrates identified in the lungs. All other labs/imaging were within normal range or not returned as of this dictation. EMERGENCY DEPARTMENT COURSE and DIFFERENTIAL DIAGNOSIS/MDM:   Vitals:    Vitals:    03/17/23 2002 03/17/23 2017 03/17/23 2102 03/17/23 2112   BP: (!) 119/50 128/61 117/63    Pulse:   67 66   Resp:   16 21   Temp:       TempSrc:       SpO2: 100%  96%    Weight:           EKG *Interpreted by me*: Sinus rhythm rate of 79 bpm with first-degree AV block. Low voltage QRS. Nonspecific ST segment abnormality. Differential Diagnosis: Sepsis, pneumonia, UTI, electrolyte abnormality    Patient seen here for some reported confusion and some question of low blood pressure at her care facility. Awake alert and oriented here.   No complaints whatsoever. Blood pressure slightly low. Given that she lives in a chronic care facility and is chronically ventilated and has end-stage renal disease broad work-up was performed. No leukocytosis. Lactic acid normal.  No acidosis. Troponin 0.27 but she had multiple similar troponins previously numerous times. No active chest pain. No EKG no new EKG changes. Chest x-ray questions some congestive changes versus infiltrates. She is got no cough. She is on her baseline ventilator settings. She resting comfortably. She does have end-stage renal disease and I suspect this is likely some mild fluid from this. She was given 1 L of fluid here and any low blood pressures resolved. She has no leukocytosis. Her hemoglobin is low but stable. Discussed the case with nephrology as she does have an elevated BUN and a potassium of 5.5. Nephrology was quite familiar with the patient and did feel that we can give her Jerry Beets but otherwise plan to dialyze her first thing tomorrow morning and did not feel that she warranted admission for this directly. At present no obvious signs of infection. Blood cultures were sent and are pending at this time but as I have no hard and fast signs of infection do feel that she can be discharged home. Does have some underlying risk with bacteremia and should her cultures return positive would likely need to return. Medications and Route:   Medications   0.9 % sodium chloride bolus (0 mLs IntraVENous Stopped 3/17/23 1144)   sodium zirconium cyclosilicate (LOKELMA) oral suspension 10 g (10 g Oral Given 3/17/23 1407)       History From: Patient         Chronic Conditions: Noted in HPI    CONSULTS: (Who and What was discussed)  Jazzy Marks M.D., am the primary clinician of record.      MDM     Amount and/or Complexity of Data Reviewed  Decide to obtain previous medical records or to obtain history from someone other than the patient: yes        CONSULTS     IP CONSULT TO NEPHROLOGY    Critical Care:   None    REASSESSMENT          PROCEDURE     Unless otherwise noted below, none     Procedures      FINAL IMPRESSION      1. Hypotension, unspecified hypotension type    2. ESRD (end stage renal disease) [facility])            DISPOSITION/PLAN   DISPOSITION Decision To Discharge 03/17/2023 01:58:01 PM        PATIENT REFERRED TO:  Guerita Combs MD  [address] [place] [address]  [phone]    Schedule an appointment as soon as possible for a visit       DISCHARGE MEDICATIONS:  Discharge Medication List as of 3/17/2023 10:44 PM        Controlled Substances Monitoring:     No flowsheet data found.     (Please note that portions of this note were completed with a voice recognition program.  Efforts were made to edit the dictations but occasionally words are mis-transcribed.)    Andi Kamara MD (electronically signed)  Attending Emergency Physician            Katerina Caldwell MD  03/18/23 2231

## 2023-03-17 NOTE — ED NOTES
1600- Attempted to set up transport to [facility], called [facility], no availability until Sunday  - Called [facility], no availability until Sunday  - Called [facility], no ventilator medic  - called [facility], no capability for ventilator patient  - [facility], no availability  [facility]  - [facility], no ventilator medic  - Called [facility], no bariatric  - [facility], office closed  - Called [facility], no availability  - Called [facility], told next Wednesday  - [employer], no capability     [facility]  03/17/23 8291

## 2023-03-17 NOTE — DISCHARGE INSTRUCTIONS
Heart Failure Resources:    Heart Failure Interactive Workbook:   Go to www.[employer].com/aha-heartfailure for a Free Heart Failure Interactive Workbook provided by [employer]. This interactive workbook will provide information on Healthier Living with Heart Failure. Please copy and paste link into search bar. Use your mouse to scroll through the pages. HF Helena isadora:   Heart Failure Free smart phone isadora available for iPhone and Android download. Use your phone to track sodium intake, fluid intake, symptoms, and weight. Low Sodium Diet:  Go to www. [employer]. org website for ThumbAd which is Low Sodium! [employer] is a dialysis company, but this website offers free seasonal cookbooks. Each quarter, they will release 25-30 new recipes with a breakdown of calories, sodium, and glucose. Recipes:   Go to www.[employer]/recipes website for free recipes. Home Exercise Program:     Identification of Green/yellow/Red zones: You should be able to identify when you feel good (green zone), if you have 1-2 symptoms of HF (yellow zone), or if you are in need of medical attention (red zone). In your CHF education folder you were provided a stop light tool to outline this information. We want to you to rate your exertion levels: Our therapy team has discussed means of identification with you such as the \"Luann scale. \"  The Luann rating scale ranges from 6 to 20, where 6 means \"no exertion at all\" and 20 means \"maximal exertion. \" The goal is to use this to gauge how much effort it is taking for you to do your normal daily tasks. You should be able to recognize when too much exertion is being expended. Elements of Energy Conservation:   Prioritize/Plan: Decide what needs to be done today, and what can wait for a later date, write to do lists, Plan ahead to avoid extra trips, Gather supplies and equipment needed before starting an activity.    Position: Avoid tiring and awkward posture that may impair breathing  Pace: Slow and steady pace, never rushing! Pursed lip breathing.   Pursed Lip Breathing: \"Smell the roses, blow out the candles\"

## 2023-03-17 NOTE — ED NOTES
1331- [facility] to repage nephrology consult  [phone]- Nephrology returned call, spoke with Dr. Kitty Bullard  03/17/23 Eamon Barraza  03/17/23 7553

## 2023-03-17 NOTE — ED NOTES
Patient clean and resting peacefully at this time. Call light within reach, bed in the lowest and locked position.       Bebe Mitchell RN  03/17/23 5636

## 2023-03-17 NOTE — ED NOTES
Patient's daughter given update on patient status.       Marlo Herrera RN  03/17/23 OSMIN Daly  03/17/23 7153

## 2023-03-17 NOTE — ED NOTES
Writer walked in to patient's vent alarming. Patient requested to be suctioned. Writer suctioned patient but dry secretions was all that came out so writer called respiratory for a lavage. Beth with RT to come down.       Kary Loyola RN  03/17/23 3868

## 2023-03-18 LAB — REPORT: NORMAL

## 2023-03-18 NOTE — PROGRESS NOTES
03/17/23 2112   Patient Observation   Heart Rate 66   Resp 21   Ventilator Settings   Vt (Set, mL) 400 mL   Resp Rate (Set) 20 bmp   PEEP/CPAP (cmH2O) 5   FiO2  50 %   Pressure Support (cm H2O) 0 cm H2O   Vent Patient Data (Readings)   Vt (Measured) 377 mL   Peak Inspiratory Pressure (cmH2O) 34 cmH2O   Rate Measured 20 br/min   Minute Volume (L/min) 7.49 Liters   Mean Airway Pressure (cmH2O) 13 cmH20   Plateau Pressure (cm H2O) 24 cm H2O   Driving Pressure 19   I:E Ratio 1:2.40   Static Compliance (L/cm H2O) 20   Dynamic Compliance (L/cm H2O) 13 L/cm H2O   Vent Alarm Settings   High Pressure (cmH2O) 45 cmH2O   Low Minute Volume (lpm) 2 L/min   RR High (bpm) 40 br/min

## 2023-03-19 LAB
BACTERIA BLD CULT ORG #2: ABNORMAL
BACTERIA BLD CULT ORG #2: ABNORMAL
BACTERIA BLD CULT: ABNORMAL
ORGANISM: ABNORMAL

## 2023-03-20 ENCOUNTER — APPOINTMENT (OUTPATIENT)
Dept: INTERVENTIONAL RADIOLOGY/VASCULAR | Age: 73
DRG: 871 | End: 2023-03-20
Payer: COMMERCIAL

## 2023-03-20 ENCOUNTER — APPOINTMENT (OUTPATIENT)
Dept: GENERAL RADIOLOGY | Age: 73
DRG: 871 | End: 2023-03-20
Payer: COMMERCIAL

## 2023-03-20 ENCOUNTER — APPOINTMENT (OUTPATIENT)
Dept: CT IMAGING | Age: 73
DRG: 871 | End: 2023-03-20
Payer: COMMERCIAL

## 2023-03-20 ENCOUNTER — HOSPITAL ENCOUNTER (INPATIENT)
Age: 73
LOS: 1 days | Discharge: NURSING, REHAB OR LONG-TERM CARE | DRG: 871 | End: 2023-03-21
Attending: EMERGENCY MEDICINE | Admitting: INTERNAL MEDICINE
Payer: COMMERCIAL

## 2023-03-20 DIAGNOSIS — J95.851 VENTILATOR ASSOCIATED PNEUMONIA (HCC): ICD-10-CM

## 2023-03-20 DIAGNOSIS — Z99.2 ESRD (END STAGE RENAL DISEASE) ON DIALYSIS (HCC): ICD-10-CM

## 2023-03-20 DIAGNOSIS — A41.9 SEPTICEMIA (HCC): Primary | ICD-10-CM

## 2023-03-20 DIAGNOSIS — M19.91 PRIMARY OSTEOARTHRITIS, UNSPECIFIED SITE: ICD-10-CM

## 2023-03-20 DIAGNOSIS — N18.6 ESRD (END STAGE RENAL DISEASE) ON DIALYSIS (HCC): ICD-10-CM

## 2023-03-20 PROBLEM — R78.81 BACTEREMIA: Status: ACTIVE | Noted: 2023-03-20

## 2023-03-20 PROBLEM — Z86.718 HX OF DEEP VENOUS THROMBOSIS: Status: ACTIVE | Noted: 2023-03-20

## 2023-03-20 PROBLEM — I48.91 ATRIAL FIBRILLATION (HCC): Status: ACTIVE | Noted: 2023-03-20

## 2023-03-20 PROBLEM — I50.30 DIASTOLIC HEART FAILURE (HCC): Status: ACTIVE | Noted: 2018-10-23

## 2023-03-20 LAB
ALBUMIN SERPL-MCNC: 3.3 G/DL (ref 3.4–5)
ALBUMIN/GLOB SERPL: 0.8 {RATIO} (ref 1.1–2.2)
ALP SERPL-CCNC: 91 U/L (ref 40–129)
ALT SERPL-CCNC: 10 U/L (ref 10–40)
ANION GAP SERPL CALCULATED.3IONS-SCNC: 18 MMOL/L (ref 3–16)
AST SERPL-CCNC: 10 U/L (ref 15–37)
BACTERIA BLD CULT ORG #2: ABNORMAL
BACTERIA BLD CULT ORG #2: ABNORMAL
BACTERIA BLD CULT: ABNORMAL
BACTERIA BLD CULT: ABNORMAL
BASOPHILS # BLD: 0 K/UL (ref 0–0.2)
BASOPHILS NFR BLD: 0.2 %
BILIRUB SERPL-MCNC: 0.4 MG/DL (ref 0–1)
BUN SERPL-MCNC: 83 MG/DL (ref 7–20)
CALCIUM SERPL-MCNC: 9.3 MG/DL (ref 8.3–10.6)
CHLORIDE SERPL-SCNC: 95 MMOL/L (ref 99–110)
CO2 SERPL-SCNC: 25 MMOL/L (ref 21–32)
CREAT SERPL-MCNC: 5.8 MG/DL (ref 0.6–1.2)
DEPRECATED RDW RBC AUTO: 16.9 % (ref 12.4–15.4)
EOSINOPHIL # BLD: 0.4 K/UL (ref 0–0.6)
EOSINOPHIL NFR BLD: 7.1 %
GFR SERPLBLD CREATININE-BSD FMLA CKD-EPI: 7 ML/MIN/{1.73_M2}
GLUCOSE BLD-MCNC: 119 MG/DL (ref 70–99)
GLUCOSE BLD-MCNC: 135 MG/DL (ref 70–99)
GLUCOSE SERPL-MCNC: 152 MG/DL (ref 70–99)
HCT VFR BLD AUTO: 24.5 % (ref 36–48)
HGB BLD-MCNC: 7.6 G/DL (ref 12–16)
INR PPP: 1.19 (ref 0.87–1.14)
IRON SATN MFR SERPL: 29 % (ref 15–50)
IRON SERPL-MCNC: 40 UG/DL (ref 37–145)
LACTATE BLDV-SCNC: 0.9 MMOL/L (ref 0.4–1.9)
LYMPHOCYTES # BLD: 0.6 K/UL (ref 1–5.1)
LYMPHOCYTES NFR BLD: 10.6 %
MCH RBC QN AUTO: 31.9 PG (ref 26–34)
MCHC RBC AUTO-ENTMCNC: 31.2 G/DL (ref 31–36)
MCV RBC AUTO: 102.2 FL (ref 80–100)
MONOCYTES # BLD: 0.4 K/UL (ref 0–1.3)
MONOCYTES NFR BLD: 6.3 %
NEUTROPHILS # BLD: 4.6 K/UL (ref 1.7–7.7)
NEUTROPHILS NFR BLD: 75.8 %
ORGANISM: ABNORMAL
PERFORMED ON: ABNORMAL
PERFORMED ON: ABNORMAL
PLATELET # BLD AUTO: 164 K/UL (ref 135–450)
PMV BLD AUTO: 8.8 FL (ref 5–10.5)
POTASSIUM SERPL-SCNC: 4.5 MMOL/L (ref 3.5–5.1)
PROCALCITONIN SERPL IA-MCNC: 1.98 NG/ML (ref 0–0.15)
PROT SERPL-MCNC: 7.7 G/DL (ref 6.4–8.2)
PROTHROMBIN TIME: 15 SEC (ref 11.7–14.5)
RBC # BLD AUTO: 2.4 M/UL (ref 4–5.2)
SARS-COV-2 RDRP RESP QL NAA+PROBE: NOT DETECTED
SODIUM SERPL-SCNC: 138 MMOL/L (ref 136–145)
TIBC SERPL-MCNC: 139 UG/DL (ref 260–445)
WBC # BLD AUTO: 6 K/UL (ref 4–11)

## 2023-03-20 PROCEDURE — 80074 ACUTE HEPATITIS PANEL: CPT

## 2023-03-20 PROCEDURE — 71045 X-RAY EXAM CHEST 1 VIEW: CPT

## 2023-03-20 PROCEDURE — 85610 PROTHROMBIN TIME: CPT

## 2023-03-20 PROCEDURE — 36415 COLL VENOUS BLD VENIPUNCTURE: CPT

## 2023-03-20 PROCEDURE — 2700000000 HC OXYGEN THERAPY PER DAY

## 2023-03-20 PROCEDURE — 99223 1ST HOSP IP/OBS HIGH 75: CPT

## 2023-03-20 PROCEDURE — 83550 IRON BINDING TEST: CPT

## 2023-03-20 PROCEDURE — 80053 COMPREHEN METABOLIC PANEL: CPT

## 2023-03-20 PROCEDURE — 2500000003 HC RX 250 WO HCPCS

## 2023-03-20 PROCEDURE — 6370000000 HC RX 637 (ALT 250 FOR IP)

## 2023-03-20 PROCEDURE — 85025 COMPLETE CBC W/AUTO DIFF WBC: CPT

## 2023-03-20 PROCEDURE — 94002 VENT MGMT INPAT INIT DAY: CPT

## 2023-03-20 PROCEDURE — 87635 SARS-COV-2 COVID-19 AMP PRB: CPT

## 2023-03-20 PROCEDURE — 83605 ASSAY OF LACTIC ACID: CPT

## 2023-03-20 PROCEDURE — 84145 PROCALCITONIN (PCT): CPT

## 2023-03-20 PROCEDURE — 94640 AIRWAY INHALATION TREATMENT: CPT

## 2023-03-20 PROCEDURE — 2060000000 HC ICU INTERMEDIATE R&B

## 2023-03-20 PROCEDURE — 99285 EMERGENCY DEPT VISIT HI MDM: CPT

## 2023-03-20 PROCEDURE — 5A1935Z RESPIRATORY VENTILATION, LESS THAN 24 CONSECUTIVE HOURS: ICD-10-PCS | Performed by: INTERNAL MEDICINE

## 2023-03-20 PROCEDURE — 83540 ASSAY OF IRON: CPT

## 2023-03-20 PROCEDURE — 94761 N-INVAS EAR/PLS OXIMETRY MLT: CPT

## 2023-03-20 PROCEDURE — 74176 CT ABD & PELVIS W/O CONTRAST: CPT

## 2023-03-20 PROCEDURE — 99223 1ST HOSP IP/OBS HIGH 75: CPT | Performed by: INTERNAL MEDICINE

## 2023-03-20 PROCEDURE — 86706 HEP B SURFACE ANTIBODY: CPT

## 2023-03-20 PROCEDURE — 83036 HEMOGLOBIN GLYCOSYLATED A1C: CPT

## 2023-03-20 RX ORDER — OXYCODONE HYDROCHLORIDE 5 MG/1
5 TABLET ORAL 2 TIMES DAILY
Status: DISCONTINUED | OUTPATIENT
Start: 2023-03-20 | End: 2023-03-21 | Stop reason: HOSPADM

## 2023-03-20 RX ORDER — TRAZODONE HYDROCHLORIDE 50 MG/1
50 TABLET ORAL NIGHTLY
Status: DISCONTINUED | OUTPATIENT
Start: 2023-03-20 | End: 2023-03-21 | Stop reason: HOSPADM

## 2023-03-20 RX ORDER — ONDANSETRON 2 MG/ML
4 INJECTION INTRAMUSCULAR; INTRAVENOUS EVERY 6 HOURS PRN
Status: DISCONTINUED | OUTPATIENT
Start: 2023-03-20 | End: 2023-03-21 | Stop reason: HOSPADM

## 2023-03-20 RX ORDER — PANTOPRAZOLE SODIUM 40 MG/1
40 TABLET, DELAYED RELEASE ORAL
Status: DISCONTINUED | OUTPATIENT
Start: 2023-03-21 | End: 2023-03-21 | Stop reason: HOSPADM

## 2023-03-20 RX ORDER — ONDANSETRON 4 MG/1
4 TABLET, ORALLY DISINTEGRATING ORAL EVERY 8 HOURS PRN
Status: DISCONTINUED | OUTPATIENT
Start: 2023-03-20 | End: 2023-03-21 | Stop reason: HOSPADM

## 2023-03-20 RX ORDER — OXYCODONE HYDROCHLORIDE 5 MG/1
5 TABLET ORAL 2 TIMES DAILY
Status: ON HOLD | COMMUNITY
Start: 2023-03-19 | End: 2023-03-21 | Stop reason: SDUPTHER

## 2023-03-20 RX ORDER — SODIUM CHLORIDE 9 MG/ML
INJECTION, SOLUTION INTRAVENOUS PRN
Status: DISCONTINUED | OUTPATIENT
Start: 2023-03-20 | End: 2023-03-21 | Stop reason: HOSPADM

## 2023-03-20 RX ORDER — BISACODYL 10 MG
10 SUPPOSITORY, RECTAL RECTAL DAILY PRN
Status: DISCONTINUED | OUTPATIENT
Start: 2023-03-20 | End: 2023-03-21 | Stop reason: HOSPADM

## 2023-03-20 RX ORDER — SODIUM CHLORIDE 9 MG/ML
25 INJECTION, SOLUTION INTRAVENOUS PRN
Status: DISCONTINUED | OUTPATIENT
Start: 2023-03-20 | End: 2023-03-20 | Stop reason: SDUPTHER

## 2023-03-20 RX ORDER — MAGNESIUM SULFATE 1 G/100ML
1000 INJECTION INTRAVENOUS PRN
Status: DISCONTINUED | OUTPATIENT
Start: 2023-03-20 | End: 2023-03-20

## 2023-03-20 RX ORDER — INSULIN LISPRO 100 [IU]/ML
0-4 INJECTION, SOLUTION INTRAVENOUS; SUBCUTANEOUS
Status: DISCONTINUED | OUTPATIENT
Start: 2023-03-20 | End: 2023-03-21 | Stop reason: HOSPADM

## 2023-03-20 RX ORDER — MIDODRINE HYDROCHLORIDE 10 MG/1
10 TABLET ORAL
Status: DISCONTINUED | OUTPATIENT
Start: 2023-03-20 | End: 2023-03-21 | Stop reason: HOSPADM

## 2023-03-20 RX ORDER — POTASSIUM CHLORIDE 20 MEQ/1
40 TABLET, EXTENDED RELEASE ORAL PRN
Status: DISCONTINUED | OUTPATIENT
Start: 2023-03-20 | End: 2023-03-20

## 2023-03-20 RX ORDER — SENNA AND DOCUSATE SODIUM 50; 8.6 MG/1; MG/1
2 TABLET, FILM COATED ORAL NIGHTLY
Status: DISCONTINUED | OUTPATIENT
Start: 2023-03-20 | End: 2023-03-21 | Stop reason: HOSPADM

## 2023-03-20 RX ORDER — ACETAMINOPHEN 650 MG/1
650 SUPPOSITORY RECTAL EVERY 6 HOURS PRN
Status: DISCONTINUED | OUTPATIENT
Start: 2023-03-20 | End: 2023-03-21 | Stop reason: HOSPADM

## 2023-03-20 RX ORDER — CARBOXYMETHYLCELLULOSE SODIUM 10 MG/ML
1 GEL OPHTHALMIC EVERY 4 HOURS PRN
Status: DISCONTINUED | OUTPATIENT
Start: 2023-03-20 | End: 2023-03-21 | Stop reason: HOSPADM

## 2023-03-20 RX ORDER — ACETAMINOPHEN 325 MG/1
650 TABLET ORAL EVERY 6 HOURS PRN
Status: DISCONTINUED | OUTPATIENT
Start: 2023-03-20 | End: 2023-03-21 | Stop reason: HOSPADM

## 2023-03-20 RX ORDER — ALBUTEROL SULFATE 2.5 MG/3ML
2.5 SOLUTION RESPIRATORY (INHALATION) EVERY 4 HOURS PRN
Status: DISCONTINUED | OUTPATIENT
Start: 2023-03-20 | End: 2023-03-21 | Stop reason: HOSPADM

## 2023-03-20 RX ORDER — ATORVASTATIN CALCIUM 40 MG/1
40 TABLET, FILM COATED ORAL NIGHTLY
Status: DISCONTINUED | OUTPATIENT
Start: 2023-03-20 | End: 2023-03-21 | Stop reason: HOSPADM

## 2023-03-20 RX ORDER — SODIUM CHLORIDE 0.9 % (FLUSH) 0.9 %
5-40 SYRINGE (ML) INJECTION EVERY 12 HOURS SCHEDULED
Status: DISCONTINUED | OUTPATIENT
Start: 2023-03-20 | End: 2023-03-21 | Stop reason: HOSPADM

## 2023-03-20 RX ORDER — POTASSIUM CHLORIDE 7.45 MG/ML
10 INJECTION INTRAVENOUS PRN
Status: DISCONTINUED | OUTPATIENT
Start: 2023-03-20 | End: 2023-03-20

## 2023-03-20 RX ORDER — SODIUM CHLORIDE 0.9 % (FLUSH) 0.9 %
5-40 SYRINGE (ML) INJECTION EVERY 12 HOURS SCHEDULED
Status: DISCONTINUED | OUTPATIENT
Start: 2023-03-20 | End: 2023-03-20 | Stop reason: SDUPTHER

## 2023-03-20 RX ORDER — POLYETHYLENE GLYCOL 3350 17 G/17G
17 POWDER, FOR SOLUTION ORAL DAILY PRN
Status: DISCONTINUED | OUTPATIENT
Start: 2023-03-20 | End: 2023-03-21 | Stop reason: HOSPADM

## 2023-03-20 RX ORDER — SODIUM CHLORIDE 0.9 % (FLUSH) 0.9 %
10 SYRINGE (ML) INJECTION PRN
Status: DISCONTINUED | OUTPATIENT
Start: 2023-03-20 | End: 2023-03-21 | Stop reason: HOSPADM

## 2023-03-20 RX ORDER — DEXTROSE MONOHYDRATE 100 MG/ML
INJECTION, SOLUTION INTRAVENOUS CONTINUOUS PRN
Status: DISCONTINUED | OUTPATIENT
Start: 2023-03-20 | End: 2023-03-21 | Stop reason: HOSPADM

## 2023-03-20 RX ORDER — CALCIUM ACETATE 667 MG/1
3 CAPSULE ORAL
Status: DISCONTINUED | OUTPATIENT
Start: 2023-03-20 | End: 2023-03-21 | Stop reason: HOSPADM

## 2023-03-20 RX ORDER — CHLORHEXIDINE GLUCONATE ORAL RINSE 1.2 MG/ML
15 SOLUTION DENTAL 2 TIMES DAILY
Status: DISCONTINUED | OUTPATIENT
Start: 2023-03-20 | End: 2023-03-21 | Stop reason: HOSPADM

## 2023-03-20 RX ORDER — HYDROCORTISONE 25 MG/G
CREAM TOPICAL 2 TIMES DAILY
Status: DISCONTINUED | OUTPATIENT
Start: 2023-03-20 | End: 2023-03-21 | Stop reason: HOSPADM

## 2023-03-20 RX ORDER — LIDOCAINE HYDROCHLORIDE 10 MG/ML
5 INJECTION, SOLUTION INFILTRATION; PERINEURAL ONCE
Status: DISCONTINUED | OUTPATIENT
Start: 2023-03-20 | End: 2023-03-21 | Stop reason: HOSPADM

## 2023-03-20 RX ORDER — GABAPENTIN 300 MG/1
300 CAPSULE ORAL DAILY
Status: DISCONTINUED | OUTPATIENT
Start: 2023-03-20 | End: 2023-03-21 | Stop reason: HOSPADM

## 2023-03-20 RX ORDER — IPRATROPIUM BROMIDE AND ALBUTEROL SULFATE 2.5; .5 MG/3ML; MG/3ML
1 SOLUTION RESPIRATORY (INHALATION) EVERY 4 HOURS
Status: DISCONTINUED | OUTPATIENT
Start: 2023-03-20 | End: 2023-03-21 | Stop reason: HOSPADM

## 2023-03-20 RX ORDER — INSULIN LISPRO 100 [IU]/ML
0-4 INJECTION, SOLUTION INTRAVENOUS; SUBCUTANEOUS NIGHTLY
Status: DISCONTINUED | OUTPATIENT
Start: 2023-03-20 | End: 2023-03-21 | Stop reason: HOSPADM

## 2023-03-20 RX ORDER — SODIUM CHLORIDE 0.9 % (FLUSH) 0.9 %
5-40 SYRINGE (ML) INJECTION PRN
Status: DISCONTINUED | OUTPATIENT
Start: 2023-03-20 | End: 2023-03-20 | Stop reason: SDUPTHER

## 2023-03-20 RX ADMIN — STANDARDIZED SENNA CONCENTRATE AND DOCUSATE SODIUM 2 TABLET: 8.6; 5 TABLET ORAL at 21:54

## 2023-03-20 RX ADMIN — METOPROLOL TARTRATE 25 MG: 25 TABLET, FILM COATED ORAL at 21:54

## 2023-03-20 RX ADMIN — IPRATROPIUM BROMIDE AND ALBUTEROL SULFATE 3 ML: .5; 2.5 SOLUTION RESPIRATORY (INHALATION) at 23:12

## 2023-03-20 RX ADMIN — OXYCODONE HYDROCHLORIDE 5 MG: 5 TABLET ORAL at 21:54

## 2023-03-20 RX ADMIN — CALCIUM ACETATE 2001 MG: 667 CAPSULE ORAL at 17:31

## 2023-03-20 RX ADMIN — SERTRALINE HYDROCHLORIDE 50 MG: 50 TABLET ORAL at 17:31

## 2023-03-20 RX ADMIN — IPRATROPIUM BROMIDE AND ALBUTEROL SULFATE 3 ML: .5; 2.5 SOLUTION RESPIRATORY (INHALATION) at 19:40

## 2023-03-20 RX ADMIN — GABAPENTIN 300 MG: 300 CAPSULE ORAL at 17:31

## 2023-03-20 RX ADMIN — APIXABAN 2.5 MG: 5 TABLET, FILM COATED ORAL at 21:54

## 2023-03-20 RX ADMIN — CHLORHEXIDINE GLUCONATE 0.12% ORAL RINSE 15 ML: 1.2 LIQUID ORAL at 21:55

## 2023-03-20 RX ADMIN — IPRATROPIUM BROMIDE AND ALBUTEROL SULFATE 3 ML: .5; 2.5 SOLUTION RESPIRATORY (INHALATION) at 15:23

## 2023-03-20 RX ADMIN — ATORVASTATIN CALCIUM 40 MG: 40 TABLET, FILM COATED ORAL at 21:54

## 2023-03-20 RX ADMIN — TRAZODONE HYDROCHLORIDE 50 MG: 50 TABLET ORAL at 21:54

## 2023-03-20 RX ADMIN — HYDROCORTISONE: 25 CREAM TOPICAL at 21:55

## 2023-03-20 ASSESSMENT — PULMONARY FUNCTION TESTS
PIF_VALUE: 29
PIF_VALUE: 28
PIF_VALUE: 39
PIF_VALUE: 30
PIF_VALUE: 40

## 2023-03-20 ASSESSMENT — PAIN - FUNCTIONAL ASSESSMENT: PAIN_FUNCTIONAL_ASSESSMENT: NONE - DENIES PAIN

## 2023-03-20 ASSESSMENT — PAIN SCALES - GENERAL: PAINLEVEL_OUTOF10: 2

## 2023-03-20 NOTE — ED NOTES
1211-Call placed back to Nephrology requested by Dr. Christie Butt. Angelica Lal  03/20/23 1211  1219-Call back received from Dr. Anton Joiner spoke with Dr. Christie Butt.       Angelica Lal  03/20/23 1220

## 2023-03-20 NOTE — PROGRESS NOTES
03/20/23 1940   Breath Sounds   Right Upper Lobe Diminished   Right Middle Lobe Diminished   Right Lower Lobe Diminished   Left Upper Lobe Diminished   Left Lower Lobe Diminished   Vent Information   Vent Mode AC/VC   Ventilator Settings   FiO2  50 %   Vt (Set, mL) 400 mL   Resp Rate (Set) 20 bmp   PEEP/CPAP (cmH2O) 5   Vent Patient Data (Readings)   Vt (Measured) 390 mL   Peak Inspiratory Pressure (cmH2O) 39 cmH2O   Rate Measured 20 br/min   Minute Volume (L/min) 7.78 Liters   Peak Inspiratory Flow (lpm) 50 L/sec   Mean Airway Pressure (cmH2O) 15 cmH20   I:E Ratio 1:2.4   Flow Sensitivity 3 L/min   Vent Alarm Settings   High Pressure (cmH2O) 45 cmH2O   Low Minute Volume (lpm) 4 L/min   High Minute Volume (lpm) 20 L/min   Low Exhaled Vt (ml) 200 mL   High Exhaled Vt (ml) 900 mL   RR High (bpm) 40 br/min   Apnea (secs) 20 secs   Additional Respiratoray Assessments   Humidification Source Heated wire   Humidification Temp 37   Circuit Condensation Drained   Treatment   $Treatment Type $Inhaled Therapy/Meds

## 2023-03-20 NOTE — ED NOTES
1047-Call placed to Nephrology for consult placed by Dr. Lieutenant Brantley. Bry Pearson  03/20/23 1044  1059-Call back received from Dr. Felicita Bynum Nephrology spoke with Dr. Lieutenant Brantley regarding consult.      Bry Pearson  03/20/23 1103

## 2023-03-20 NOTE — CARE COORDINATION
Case Management Assessment  Initial Evaluation    Date/Time of Evaluation: 3/20/2023 2:49 PM  Assessment Completed by: Juan Moore RN    If patient is discharged prior to next notation, then this note serves as note for discharge by case management. Patient Name: Mary Menon                   YOB: 1950  Diagnosis: Bacteremia [R78.81]  Septicemia ([facility] [A41.9]  Ventilator associated pneumonia ([facility] [J95.851]  ESRD (end stage renal disease) on dialysis ([facility] [N18.6, Z99.2]                   Date / Time: 3/20/2023 10:38 AM    Patient Admission Status: Inpatient   Readmission Risk (Low < 19, Mod (19-27), High > 27): Readmission Risk Score: 33    Current PCP: Sharlet Aschoff, MD  PCP verified by CM? Yes    Chart Reviewed: Yes      History Provided by: Child/Family  Patient Orientation: Unable to Assess (Alert and oriented at baseline, Remote CM assessment spoke w/daughter to confirn DC back to LTC at [facility])    Patient Cognition: Alert    Hospitalization in the last 30 days (Readmission):  Yes    If yes, Readmission Assessment in CM Navigator will be completed. Advance Directives:      Code Status: Full Code   Patient's Primary Decision Maker is: Legal Next of Kin    Primary Decision Maker: Tom Pham [place]acs - [phone]    Discharge Planning:    Patient lives with:  Other (Comment) ([facility]) Type of Home: Long-Term Care  Primary Care Giver: Family  Patient Support Systems include: Children   Current Financial resources: Medicaid  Current community resources: ECF/Home Care  Current services prior to admission: (P) [address], Other (Comment) (OP Dialysis at [facility] [address])            Current DME:              Type of Home Care services:  (P) None    ADLS  Prior functional level: Bathing, Dressing, Toileting, Mobility  Current functional level: Bathing, Dressing, Toileting, Mobility    PT AM-PAC:   /24  OT AM-PAC: /24    Family can provide assistance at DC: No  Would you like Case Management to discuss the discharge plan with any other family members/significant others, and if so, who? Yes (daughter Shante Diaz)  Plans to Return to Present Housing: Yes  Other Identified Issues/Barriers to RETURNING to current housing: NONE  Potential Assistance needed at discharge: ([address] [facility]            Potential DME:    Patient expects to discharge to: (P) Other (comment) [facility])  Plan for transportation at discharge: (P) Family    Financial    Payor: Violeta Guevara / Plan: Monica Donahue / Product Type: *No Product type* /     Does insurance require precert for SNF: No    Potential assistance Purchasing Medications: (P) No  [facility]        [phone] [phone]  [address] [address]  Phone: [phone] Fax: [phone]      Notes:    Factors facilitating achievement of predicted outcomes: Caregiver support    Barriers to discharge: Medical complications/readmissions    Additional Case Management Notes: Chart reviewed. Met with pt's daughter via phone (remote) and explained the role of the CM. Plan: [facility] and [facility]. DCP confirmed with daughter, Shante Diaz and Juno Weathers [facility] admissions)      The Plan for Transition of Care is related to the following treatment goals of Bacteremia [R78.81]  Septicemia ([facility] [facility] [A41.9]  Ventilator associated pneumonia ([facility] [address] [J95.851]  ESRD (end stage renal disease) on dialysis ([facility] [address] [N18.6, T78.5]    IF APPLICABLE: The Patient and/or patient representative Mamadou Clayton and her family were provided with a choice of provider and agrees with the discharge plan.  Freedom of choice list with basic dialogue that supports the patient's individualized plan of care/goals and shares the quality data associated with the providers was provided to: (P) Patient Representative   Patient

## 2023-03-20 NOTE — PROGRESS NOTES
Standard electrolyte (KCL/Magnesium) replacement orders were discontinued, as patient does not meet criteria for standard electrolyte replacement orders. Patient's CrCl is less than 30 ml/min. Orders must be placed on a dose specific basis.

## 2023-03-20 NOTE — H&P
Hospital Medicine History & Physical      PCP: Gala Fletcher MD    Date of Admission: 3/20/2023    Date of Service: Pt seen/examined on 3/20/2023     Chief Complaint:    Chief Complaint   Patient presents with    Other     Patient was seen in ER 3 days ago and discharged back to nursing home facility. Per nursing home, patient was sent to ER for an order for ATB due to blood cultures coming back as positive for enterococcus. History Of Present Illness: The patient is a 71 y.o. female with pmhx of chronic vent trach, hx of DVT, CAD, hx of CVA, chronic pain, ESRD on HD, schizophrenia, DM, atrial fibrillation who presented to [facility] ED, patient was seen 3 days ago and had blood cultures drawn at the time. Returned back 2/2 to + blood cultures with unknown source, + enterococcus faecalis. Patient denies any symptoms, no worsening, cough, chest pain, shortness of breath, vomiting, diarrhea, painful urination, abdominal pain, chills. She is stable on baseline vent settings.      Past Medical History:        Diagnosis Date    Acute and chronic respiratory failure (acute-on-chronic) ([facility])     Acute blood loss anemia 07/01/2020    Acute deep vein thrombosis (DVT) of brachial vein of left upper extremity ([facility] 02/20/2019    Formatting of this note might be different from the original. Dx February 2019    Anxiety disorder     Atherosclerotic heart disease of native coronary artery without angina pectoris     Bleeding hemorrhoids 06/29/2020    Cerebellar infarct ([facility] 09/29/2019    Cerebral infarction ([facility])     Chronic pain syndrome     Dependence on renal dialysis ([address]     Dependence on respirator ([facility])     End stage renal disease ([address]     Gastro-esophageal reflux disease with esophagitis, without bleeding     Insomnia     Major depressive disorder, recurrent ([address]     Morbid obesity due to excess calories ([facility])     Muscle weakness     Obstructive sleep apnea (adult) (pediatric) midodrine 3x weekly     #Chronic diastolic HF   -last echo as above     #Hx of DVT   -on eliquis     #Hx of CVA   -on eliquis and statin     #Chronic pain   -on gabapentin and oxycodone     DVT Prophylaxis: Eliquis   Diet: ADULT DIET; Dysphagia - Pureed; Low Potassium (Less than 3000 mg/day);  Low Phosphorus (Less than 1000 mg)  Code Status: Full Code    NIKKO Mike  03/20/23  4:08 PM

## 2023-03-20 NOTE — PROGRESS NOTES
Spoke to Pt' nurse Boy Andrews. She states PA Justine Artis informed her that PICC order would be DCed and pt would receive her ABX during dialysis. Asked Boy Andrews if pt would need a peripheral line. Boy Andrews states PA would place a note stating that no peripheral access was needed. No lines placed at this time.

## 2023-03-20 NOTE — ED NOTES
Vent alarming; connections secured; suction provided; alarms continue to go off,  RT called.       Khushboo Shi RN  03/20/23 4536

## 2023-03-20 NOTE — ACP (ADVANCE CARE PLANNING)
Advance Care Planning     General Advance Care Planning (ACP) Conversation    Date of Conversation: 3/20/2023  Conducted with: Patient with Decision Making Capacity    Healthcare Decision Maker:    Primary Decision Maker: Toni Christensen - [facility] - [phone]  Click here to complete [address] including selection of the Healthcare Decision Maker Relationship (ie \"Primary\"). Today we documented Decision Maker(s) consistent with Legal Next of Kin hierarchy.     Content/Action Overview:  Has NO ACP documents/care preferences - information provided, considering goals and options  Reviewed DNR/DNI and patient elects Full Code (Attempt Resuscitation)    Length of Voluntary ACP Conversation in minutes:  <16 minutes (Non-Billable)    Nia Siu RN

## 2023-03-20 NOTE — PROGRESS NOTES
[facility] Pharmacokinetic Monitoring Service - Vancomycin     Jenelle Morgan is a 71 y.o. female starting on vancomycin therapy for VAP. Pharmacy consulted by Yemi Bueno for monitoring and adjustment. Target Concentration: Dosing based on anticipated concentration <15 mg/L due to renal impairment/insufficiency    Additional Antimicrobials: Cefepime    Pertinent Laboratory Values: Wt Readings from Last 1 Encounters:   03/20/23 (!) 315 lb (142.9 kg)     Temp Readings from Last 1 Encounters:   03/20/23 97.6 °F (36.4 °C) (Oral)     Estimated Creatinine Clearance: 13 mL/min (A) (based on SCr of 5.8 mg/dL [facility])). Recent Labs     03/20/23  1150   CREATININE 5.8*   WBC 6.0     Procalcitonin: 1.98    Pertinent Cultures:  Culture Date Source Results   3/20         MRSA Nasal Swab: not ordered. Order placed by pharmacy. Plan:  Concentration-guided dosing due to renal impairment/insufficiency  Start vancomycin 1500mg times one    Renal labs as indicated   Vancomycin concentration ordered for 3/21 @ 0600   Pharmacy will continue to monitor patient and adjust therapy as indicated  HD normal days Tu,Th and Sat.  May need additional , will wait before ordering additional Vanc labs  Thank you for the consult,  EMIR Spencer [facility]  3/20/2023 3:24 PM

## 2023-03-20 NOTE — PROGRESS NOTES
03/20/23 1940   RT Protocol   History Pulmonary Disease 2   Respiratory pattern 4   Breath sounds 2   Cough 0   Indications for Bronchodilator Therapy Decreased or absent breath sounds   Bronchodilator Assessment Score 8   RT Inhaler-Nebulizer Bronchodilator Protocol Note    There is a bronchodilator order in the chart from a provider indicating to follow the RT Bronchodilator Protocol and there is an Initiate RT Inhaler-Nebulizer Bronchodilator Protocol order as well (see protocol at bottom of note). CXR Findings:  XR CHEST PORTABLE    Result Date: 3/20/2023  Mild right basilar airspace disease. This could represent atelectasis or pneumonia in the appropriate clinical setting. The findings from the last RT Protocol Assessment were as follows:   History Pulmonary Disease: Chronic pulmonary disease  Respiratory Pattern: Mild dyspnea at rest, irregular pattern, or RR 21-25 bpm  Breath Sounds: Slightly diminished and/or crackles  Cough: Strong, spontaneous, non-productive  Indication for Bronchodilator Therapy: Decreased or absent breath sounds  Bronchodilator Assessment Score: 8    Aerosolized bronchodilator medication orders have been revised according to the RT Inhaler-Nebulizer Bronchodilator Protocol below. Respiratory Therapist to perform RT Therapy Protocol Assessment initially then follow the protocol. Repeat RT Therapy Protocol Assessment PRN for score 0-3 or on second treatment, BID, and PRN for scores above 3. No Indications - adjust the frequency to every 6 hours PRN wheezing or bronchospasm, if no treatments needed after 48 hours then discontinue using Per Protocol order mode. If indication present, adjust the RT bronchodilator orders based on the Bronchodilator Assessment Score as indicated below.   Use Inhaler orders unless patient has one or more of the following: on home nebulizer, not able to hold breath for 10 seconds, is not alert and oriented, cannot activate and use MDI correctly, or respiratory rate 25 breaths per minute or more, then use the equivalent nebulizer order(s) with same Frequency and PRN reasons based on the score. If a patient is on this medication at home then do not decrease Frequency below that used at home. 0-3 - enter or revise RT bronchodilator order(s) to equivalent RT Bronchodilator order with Frequency of every 4 hours PRN for wheezing or increased work of breathing using Per Protocol order mode. 4-6 - enter or revise RT Bronchodilator order(s) to two equivalent RT bronchodilator orders with one order with BID Frequency and one order with Frequency of every 4 hours PRN wheezing or increased work of breathing using Per Protocol order mode. 7-10 - enter or revise RT Bronchodilator order(s) to two equivalent RT bronchodilator orders with one order with TID Frequency and one order with Frequency of every 4 hours PRN wheezing or increased work of breathing using Per Protocol order mode. 11-13 - enter or revise RT Bronchodilator order(s) to one equivalent RT bronchodilator order with QID Frequency and an Albuterol order with Frequency of every 4 hours PRN wheezing or increased work of breathing using Per Protocol order mode. Greater than 13 - enter or revise RT Bronchodilator order(s) to one equivalent RT bronchodilator order with every 4 hours Frequency and an Albuterol order with Frequency of every 2 hours PRN wheezing or increased work of breathing using Per Protocol order mode.        Electronically signed by Janice Curtis RCP on 3/20/2023 at 7:44 PM

## 2023-03-20 NOTE — ED NOTES
Nursing home called to send medication list; to fax medication list to Josiane Crenshaw RN  03/20/23 3418

## 2023-03-20 NOTE — PLAN OF CARE
Presented with concern for + blood culture results that were drawn in the ED     Organism Enterococcus faecalis DNA Detected Abnormal     Blood Culture, Routine Donovan/B gene not detected. See additional report for complete BCID panel. Negative results for this marker does not indicate susceptibility,   as multiple mechanisms of resistance to Vancomycin exist.   Please see full susceptibility report.     Organism Enterococcus faecalis Abnormal     Blood Culture, Routine POSITIVE for   Isolated two of two sets        -sensitive to vancomycin       -suspected by ED provider to be VAP    Pt stable on baseline trach settings     Nephrology and pulmonology consulted     -CXR with mild right bibasilar disease     NPO- swallow eval,hx of mucus plugging     PCU admit       NIKKO Pro  03/20/23  1:54 PM

## 2023-03-20 NOTE — ED PROVIDER NOTES
weakness     Obstructive sleep apnea (adult) (pediatric)     Other hyperlipidemia     Other voice and resonance disorders     Personal history of COVID-19     Pulmonary hypertension ([facility]     Schizo affective schizophrenia ([facility] [facility] 02/04/2022    Tracheostomy status ([facility] [facility]     Type 2 diabetes mellitus without complications ([facility]     Unspecified atrial fibrillation ([facility] [facility]        Past surgical history:   Past Surgical History:   Procedure Laterality Date    COLECTOMY N/A 5/10/2022    EXPLORATORY LAPAROTOMY, LYSIS OF ADHESIONS performed by Maxim Al MD at [address] CATH  2/27/2023    IR MIDLINE CATH 2/27/2023 [address] SPECIAL PROCEDURES       Home medications:   Prior to Admission medications    Medication Sig Start Date End Date Taking? Authorizing Provider   metoprolol tartrate (LOPRESSOR) 25 MG tablet Take 1 tablet by mouth 2 times daily Hold for SBP less than 110 1/24/23   Kelsey Sheffield MD   gabapentin (NEURONTIN) 300 MG capsule Take 300 mg by mouth 3 times daily.     Historical Provider, MD   sennosides-docusate sodium (SENOKOT-S) 8.6-50 MG tablet Take 2 tablets by mouth at bedtime    Historical Provider, MD   BISACODYL RE Place rectally daily as needed    Historical Provider, MD   Magnesium Hydroxide (DULCOLAX PO) Take 10 mg by mouth daily as needed    Historical Provider, MD   sodium phosphate (FLEET) 7-19 GM/118ML Place 1 enema rectally once as needed    Historical Provider, MD   UNABLE TO FIND Veltessa 8.4 gm by mouth one time a day every Monday, Wednesday, Friday, Sunday for hyperkalemia    Historical Provider, MD   atorvastatin (LIPITOR) 40 MG tablet Take 1 tablet by mouth nightly 10/28/22   Haider Booth MD   carboxymethylcellulose PF (THERATEARS) 1 % GEL ophthalmic gel Place 1 drop into both eyes every 4 hours as needed for Dry Eyes 10/28/22   Haider Booth MD   apixaban (ELIQUIS) 2.5 MG TABS tablet Take 2.5 mg by mouth 2 times daily    Historical Provider, MD within normal range or not returned as of this dictation    RADIOLOGY:     Non-plain film images such as CT, Ultrasound and MRI are read by the radiologist. Plain radiographic images personally reviewed. Interpretation per the Radiologist below, if available at the time of this note:  XR CHEST PORTABLE    (Results Pending)     XR CHEST PORTABLE    Result Date: 3/17/2023  EXAMINATION: ONE XRAY VIEW OF THE CHEST 3/17/2023 10:49 am COMPARISON: February 26, 2023 HISTORY: ORDERING SYSTEM PROVIDED HISTORY: cough, sob TECHNOLOGIST PROVIDED HISTORY: Reason for exam:->cough, sob Reason for Exam: cough, sob FINDINGS: Mild cardiomegaly. Mild-to-moderate congestion and/or infiltrates identified in the lungs. A tracheostomy is identified. No pneumothorax. Significant osteopenic changes and degenerative changes noted in the bony structures. Findings similar to prior study. Mild-to-moderate congestion and/or infiltrates identified in the lungs. Bedside Ultrasound, as interpreted by me, if performed:    No results found. PROCEDURES     Unless otherwise noted below, none     Procedures    CRITICAL CARE TIME     I personally spent a total of 30 minutes of critical care time in obtaining history, performing a physical exam, bedside monitoring of interventions, collecting and interpreting tests and discussion with consultants but excluding time spent performing procedures, treating other patients and teaching time. EMERGENCY DEPARTMENT COURSE and DIFFERENTIAL DIAGNOSIS/MDM:     Patient seen and evaluated. At presentation, patient placed on home vent setting by respiratory therapy and satting >90%. Denies feeling short of breath, having chest pain, or abdominal pain. Her prior ED note was personally reviewed. I reviewed her labs including the blood cultures.     Per chart review, she had positive blood cultures

## 2023-03-20 NOTE — CONSULTS
Pharmacy Note  Vancomycin Consult    Michael Jaramillo is a 71 y.o. female started on Vancomycin for pneumonia (VAP); consult received from [place], [place],  to manage therapy. Also receiving the following antibiotics: Cefepime. Allergies:  Haloperidol lactate and Ziprasidone hcl     Tmax:     Recent Labs     03/20/23  1150   CREATININE 5.8*       Recent Labs     03/20/23  1150   WBC 6.0       Estimated Creatinine Clearance: 13 mL/min (A) (based on SCr of 5.8 mg/dL [facility] CARE AT [facility])). Intake/Output Summary (Last 24 hours) at 3/20/2023 1940  Last data filed at 3/20/2023 1819  Gross per 24 hour   Intake 240 ml   Output --   Net 240 ml       Wt Readings from Last 1 Encounters:   03/20/23 (!) 315 lb (142.9 kg)         Body mass index is 54.07 kg/m². Culture Date      Source                       Results      Loading dose (critically ill or in ICU, require dialysis or renal replacement therapy): Vancomycin 25 mg/kg IVPB x 1 (maximum 2500 mg). Maintenance dose: 15 mg/kg (maximum: 2000 mg/dose and 4500 mg/day) starting at the next dosing interval determined by renal function  Pulse dose: fluctuating renal function, DEWEY, ESRD   Goal Vancomycin trough: 10-15 mcg/mL or 15-20 mcg/mL   Goal Vancomycin AUC: 400-600     Assessment/Plan:  Will initiate Vancomycin with a one time loading dose of 1500 mg x1, followed by pulse dosing for hemodialysis patient. Calculated -600. Vancomycin level ordered daily. Timing of trough level will be determined based on culture results, renal function, and clinical response. Thank you for the consult.   LEANA Jama.Ph.3/20/39421:43 PM

## 2023-03-20 NOTE — ED NOTES
1223-Call placed to [facility] for consult. Stated they \"would need Plant[place], INR, and a note from nephrology in the chart that the patient is clear for the PICC. Stated soonest availability would be approximately 1430 for the procedure\". This writer made RN aware of all information.          Maureen Sanchez  03/20/23 9531

## 2023-03-20 NOTE — PROGRESS NOTES
Admission assessment completed. Scheduled medications given per MAR. VSS trach vent, A/O x4, able to verbalize and or lip response. Patient does not appear in distress, and denies any needs at this time. Call light in reach, will monitor, bed alarm on.

## 2023-03-20 NOTE — CONSULTS
01/21/2023    Septic shock ([address] 01/22/2023    Hyperkalemia 02/25/2023    Dialysis patient [facility]) 02/27/2023    Chronic anemia 02/27/2023    Chronic respiratory failure with hypoxia ([address] 02/27/2023    ESRD (end stage renal disease) ([address] 02/27/2023    Chronic respiratory failure, unsp w hypoxia or hypercapnia ([facility]) 02/27/2023     Resolved Ambulatory Problems     Diagnosis Date Noted    Chest pain 06/17/2021    Elevated troponin     Abnormal chest x-ray     Acute blood loss anemia 07/01/2020    Acute deep vein thrombosis (DVT) of brachial vein of left upper extremity ([address] 02/20/2019    Bleeding hemorrhoids 06/29/2020    Cerebellar infarct ([address] 09/29/2019     Past Medical History:   Diagnosis Date    Acute and chronic respiratory failure (acute-on-chronic) ([facility])     Anxiety disorder     Atherosclerotic heart disease of native coronary artery without angina pectoris     Cerebral infarction ([facility])     Chronic pain syndrome     Dependence on renal dialysis ([address]     Dependence on respirator ([facility])     End stage renal disease ([address]     Gastro-esophageal reflux disease with esophagitis, without bleeding     Insomnia     Major depressive disorder, recurrent ([address]     Morbid obesity due to excess calories ([facility])     Muscle weakness     Obstructive sleep apnea (adult) (pediatric)     Other hyperlipidemia     Other voice and resonance disorders     Personal history of COVID-19     Pulmonary hypertension ([address]     Schizo affective schizophrenia ([address] 02/04/2022    Tracheostomy status ([address]     Type 2 diabetes mellitus without complications ([address]     Unspecified atrial fibrillation ([address]          Review of Systems     Constitutional:  No weight loss, no fever/chills  Eyes:  No eye pain, no eye redness  Cardiovascular:  No chest pain, no worsening of edema  Respiratory:  No hemoptysis, no stridor  Gastrointestinal:  No blood in stool, no n/v, no diarrhea  Genitoruinary:  No hematuria, no difficulty with urination  Musculoskeletal:

## 2023-03-20 NOTE — PROGRESS NOTES
decrease Frequency below that used at home. 0-3 - enter or revise RT bronchodilator order(s) to equivalent RT Bronchodilator order with Frequency of every 4 hours PRN for wheezing or increased work of breathing using Per Protocol order mode. 4-6 - enter or revise RT Bronchodilator order(s) to two equivalent RT bronchodilator orders with one order with BID Frequency and one order with Frequency of every 4 hours PRN wheezing or increased work of breathing using Per Protocol order mode. 7-10 - enter or revise RT Bronchodilator order(s) to two equivalent RT bronchodilator orders with one order with TID Frequency and one order with Frequency of every 4 hours PRN wheezing or increased work of breathing using Per Protocol order mode. 11-13 - enter or revise RT Bronchodilator order(s) to one equivalent RT bronchodilator order with QID Frequency and an Albuterol order with Frequency of every 4 hours PRN wheezing or increased work of breathing using Per Protocol order mode. Greater than 13 - enter or revise RT Bronchodilator order(s) to one equivalent RT bronchodilator order with every 4 hours Frequency and an Albuterol order with Frequency of every 2 hours PRN wheezing or increased work of breathing using Per Protocol order mode. RT to enter RT Home Evaluation for COPD & MDI Assessment order using Per Protocol order mode.     Electronically signed by Chris Gonzalez RCP on 3/20/2023 at 3:20 PM

## 2023-03-20 NOTE — ED NOTES
1317- Call placed to PICC to make aware Dr. Anup Zelaya will have a delay in the note for PICC line to be placed but gave Dr. Reji Diamond a verbal. .  Spoke to Diane Banda [facility]  03/20/23 2074

## 2023-03-20 NOTE — ED NOTES
1248-Call to Nephrology requested by Dr. Julia Mendoza. 1250-Call back received from Dr. Alma Rosa [facility] spoke with Dr. Julia Mendoza regarding consult.       Altamese Lesches  03/20/23 1257

## 2023-03-20 NOTE — PROGRESS NOTES
Patient admitted to room 312 from er. Patient oriented to room, call light, bed rails, phone, lights and bathroom. Patient instructed about the schedule of the day including: vital sign frequency, lab draws, possible tests, frequency of MD and staff rounds, daily weights, I &O's and prescribed diet. Telemetry box in place, patient aware of placement and reason. Bed locked, in lowest position, side rails up 2/4, call light within reach. Recliner Assessment  Patient is not able to demonstrated the ability to move from a reclining position to an upright position within the recliner. however patient is alert, oriented and able to provide informed consent       4 Eyes Skin Assessment     The patient is being assess for   Admission    I agree that 2 RN's have performed a thorough Head to Toe Skin Assessment on the patient. ALL assessment sites listed below have been assessed. Areas assessed for pressure by both nurses:   [x]   Head, Face, and Ears   [x]   Shoulders, Back, and Chest, Abdomen  [x]   Arms, Elbows, and Hands   [x]   Coccyx, Sacrum, and Ischium  [x]   Legs, Feet, and Heels        Skin Assessed Under all Medical Devices by both nurses:  Heel wound to coccyx, left chest skin tear. Scattered bruising and abrasion. All Mepilex Borders were peeled back and area peeked at by both nurses:  No: na  Please list where Mepilex Borders are located:  na             **SHARE this note so that the co-signing nurse is able to place an eSignature**    Co-signer eSignature: Electronically signed by John Hansen RN on 3/20/23 at 6:46 PM EDT    Does the Patient have Skin Breakdown related to pressure?   No     (Insert Photo here)         Stu Prevention initiated:  No   Wound Care Orders initiated:  No      [facility] nurse consulted for Pressure Injury (Stage 3,4, Unstageable, DTI, NWPT, Complex wounds)and New or Established Ostomies:  No      Primary Nurse eSignature: Electronically signed by Rolando Nielsen OSMIN Gongora on 3/20/23 at 6:39 PM EDT

## 2023-03-20 NOTE — ED NOTES
1243-Perfect Serve sent by Dr. Mingo Mane to Dr. Jeannine Wong for consult. Dio Shelton  03/20/23 1245  1242-Call back received from [facility] spoke with Dr. Mingo Mane regarding consult.       Dio Shelton  03/20/23 1835

## 2023-03-21 VITALS
HEART RATE: 75 BPM | RESPIRATION RATE: 20 BRPM | WEIGHT: 293 LBS | BODY MASS INDEX: 50.02 KG/M2 | HEIGHT: 64 IN | DIASTOLIC BLOOD PRESSURE: 53 MMHG | SYSTOLIC BLOOD PRESSURE: 122 MMHG | TEMPERATURE: 98.9 F | OXYGEN SATURATION: 100 %

## 2023-03-21 LAB
ANION GAP SERPL CALCULATED.3IONS-SCNC: 20 MMOL/L (ref 3–16)
BASOPHILS # BLD: 0 K/UL (ref 0–0.2)
BASOPHILS NFR BLD: 0.2 %
BUN SERPL-MCNC: 90 MG/DL (ref 7–20)
CALCIUM SERPL-MCNC: 9.1 MG/DL (ref 8.3–10.6)
CHLORIDE SERPL-SCNC: 95 MMOL/L (ref 99–110)
CO2 SERPL-SCNC: 21 MMOL/L (ref 21–32)
CREAT SERPL-MCNC: 6 MG/DL (ref 0.6–1.2)
DEPRECATED RDW RBC AUTO: 16.6 % (ref 12.4–15.4)
EOSINOPHIL # BLD: 0.3 K/UL (ref 0–0.6)
EOSINOPHIL NFR BLD: 2.8 %
EST. AVERAGE GLUCOSE BLD GHB EST-MCNC: 111.2 MG/DL
GFR SERPLBLD CREATININE-BSD FMLA CKD-EPI: 7 ML/MIN/{1.73_M2}
GLUCOSE BLD-MCNC: 110 MG/DL (ref 70–99)
GLUCOSE BLD-MCNC: 137 MG/DL (ref 70–99)
GLUCOSE SERPL-MCNC: 96 MG/DL (ref 70–99)
HAV IGM SERPL QL IA: NORMAL
HBA1C MFR BLD: 5.5 %
HBV CORE IGM SERPL QL IA: NORMAL
HBV SURFACE AB SERPL IA-ACNC: 42.03 MIU/ML
HBV SURFACE AG SERPL QL IA: NORMAL
HCT VFR BLD AUTO: 21.9 % (ref 36–48)
HCV AB SERPL QL IA: NORMAL
HGB BLD-MCNC: 7 G/DL (ref 12–16)
LYMPHOCYTES # BLD: 0.9 K/UL (ref 1–5.1)
LYMPHOCYTES NFR BLD: 8.1 %
MCH RBC QN AUTO: 31.8 PG (ref 26–34)
MCHC RBC AUTO-ENTMCNC: 32 G/DL (ref 31–36)
MCV RBC AUTO: 99.3 FL (ref 80–100)
MONOCYTES # BLD: 0.4 K/UL (ref 0–1.3)
MONOCYTES NFR BLD: 3.6 %
NEUTROPHILS # BLD: 9.7 K/UL (ref 1.7–7.7)
NEUTROPHILS NFR BLD: 85.3 %
PERFORMED ON: ABNORMAL
PERFORMED ON: ABNORMAL
PLATELET # BLD AUTO: 162 K/UL (ref 135–450)
PMV BLD AUTO: 9.1 FL (ref 5–10.5)
POTASSIUM SERPL-SCNC: 4.8 MMOL/L (ref 3.5–5.1)
RBC # BLD AUTO: 2.21 M/UL (ref 4–5.2)
SODIUM SERPL-SCNC: 136 MMOL/L (ref 136–145)
VANCOMYCIN SERPL-MCNC: <4 UG/ML
WBC # BLD AUTO: 11.3 K/UL (ref 4–11)

## 2023-03-21 PROCEDURE — 6370000000 HC RX 637 (ALT 250 FOR IP)

## 2023-03-21 PROCEDURE — 99239 HOSP IP/OBS DSCHRG MGMT >30: CPT | Performed by: INTERNAL MEDICINE

## 2023-03-21 PROCEDURE — 6360000002 HC RX W HCPCS: Performed by: INTERNAL MEDICINE

## 2023-03-21 PROCEDURE — 94761 N-INVAS EAR/PLS OXIMETRY MLT: CPT

## 2023-03-21 PROCEDURE — 94640 AIRWAY INHALATION TREATMENT: CPT

## 2023-03-21 PROCEDURE — 94003 VENT MGMT INPAT SUBQ DAY: CPT

## 2023-03-21 PROCEDURE — 80202 ASSAY OF VANCOMYCIN: CPT

## 2023-03-21 PROCEDURE — 85025 COMPLETE CBC W/AUTO DIFF WBC: CPT

## 2023-03-21 PROCEDURE — 5A1D70Z PERFORMANCE OF URINARY FILTRATION, INTERMITTENT, LESS THAN 6 HOURS PER DAY: ICD-10-PCS | Performed by: INTERNAL MEDICINE

## 2023-03-21 PROCEDURE — 87040 BLOOD CULTURE FOR BACTERIA: CPT

## 2023-03-21 PROCEDURE — 80048 BASIC METABOLIC PNL TOTAL CA: CPT

## 2023-03-21 PROCEDURE — 6360000002 HC RX W HCPCS

## 2023-03-21 PROCEDURE — 36415 COLL VENOUS BLD VENIPUNCTURE: CPT

## 2023-03-21 PROCEDURE — 2700000000 HC OXYGEN THERAPY PER DAY

## 2023-03-21 PROCEDURE — P9047 ALBUMIN (HUMAN), 25%, 50ML: HCPCS | Performed by: INTERNAL MEDICINE

## 2023-03-21 RX ORDER — ALBUMIN (HUMAN) 12.5 G/50ML
12.5 SOLUTION INTRAVENOUS DAILY PRN
Status: DISCONTINUED | OUTPATIENT
Start: 2023-03-21 | End: 2023-03-21 | Stop reason: HOSPADM

## 2023-03-21 RX ORDER — OXYCODONE HYDROCHLORIDE 5 MG/1
5 TABLET ORAL 2 TIMES DAILY
Qty: 4 TABLET | Refills: 0 | Status: SHIPPED | OUTPATIENT
Start: 2023-03-21 | End: 2023-03-23

## 2023-03-21 RX ADMIN — SERTRALINE HYDROCHLORIDE 50 MG: 50 TABLET ORAL at 09:49

## 2023-03-21 RX ADMIN — IPRATROPIUM BROMIDE AND ALBUTEROL SULFATE 3 ML: .5; 2.5 SOLUTION RESPIRATORY (INHALATION) at 12:00

## 2023-03-21 RX ADMIN — IPRATROPIUM BROMIDE AND ALBUTEROL SULFATE 3 ML: .5; 2.5 SOLUTION RESPIRATORY (INHALATION) at 03:03

## 2023-03-21 RX ADMIN — APIXABAN 2.5 MG: 5 TABLET, FILM COATED ORAL at 09:49

## 2023-03-21 RX ADMIN — ALBUMIN (HUMAN) 12.5 G: 0.25 INJECTION, SOLUTION INTRAVENOUS at 11:36

## 2023-03-21 RX ADMIN — MIDODRINE HYDROCHLORIDE 10 MG: 10 TABLET ORAL at 09:50

## 2023-03-21 RX ADMIN — IPRATROPIUM BROMIDE AND ALBUTEROL SULFATE 3 ML: .5; 2.5 SOLUTION RESPIRATORY (INHALATION) at 07:43

## 2023-03-21 RX ADMIN — Medication 1500 MG: at 12:30

## 2023-03-21 RX ADMIN — ALBUMIN (HUMAN) 12.5 G: 0.25 INJECTION, SOLUTION INTRAVENOUS at 10:14

## 2023-03-21 ASSESSMENT — PULMONARY FUNCTION TESTS
PIF_VALUE: 32
PIF_VALUE: 32
PIF_VALUE: 33

## 2023-03-21 NOTE — PROGRESS NOTES
Pt sitting in bed watching TV. Pt asked to be cleaned up and as she was turned, this writer noticed 2 areas (R lower scapula and under R breast) with mepilex covering. Wounds redressed with opticell AG and mepilex. Assessment complete-see flowsheet. Medications given-see MAR. Pt denies further needs, call light and bedside table within reach. Will continue to monitor.

## 2023-03-21 NOTE — DISCHARGE INSTR - COC
Abnormal chest x-ray R93.89    Vaginal bleeding N93.9    SBO (small bowel obstruction) ([facility]) K56.609    Fecal impaction ([facility]) K56.41    Chest pain R07.9    Coronary artery disease involving native heart with unstable angina pectoris ([facility]) I25.110    Mixed hyperlipidemia E78.2    ESRD on hemodialysis ([facility]) N18.6, Z99.2    Tracheostomy dependence ([facility]) Z93.0    Leukocytosis D72.829    Macrocytic anemia D53.9    Acute encephalopathy G93.40    HCAP (healthcare-associated pneumonia) J18.9    Severe sepsis ([facility]) A41.9, R65.20    Mucus plug in respiratory tract T17.998A    Aspiration of foreign body T17.908A    Septic shock ([facility]) A41.9, R65.21    Hyperkalemia E87.5    Dialysis patient [facility]) Z99.2    Chronic anemia D64.9    Chronic respiratory failure with hypoxia ([address] J96.11    ESRD (end stage renal disease) ([facility]) N18.6    Chronic respiratory failure, unsp w hypoxia or hypercapnia ([facility]) J96.10    Septicemia ([facility]) A41.9    Atrial fibrillation ([facility]) I48.91    Hx of deep venous thrombosis Z86.718       Isolation/Infection:   Isolation            Contact          Patient Infection Status       Infection Onset Added Last Indicated Last Indicated By Review Planned Expiration Resolved Resolved By    Tasia auris 05/26/22 10/27/22 10/27/22 Mayelin Flores RN        Added from external infection. MDRO (multi-drug resistant organism) 02/17/22 02/20/22 02/17/22 Culture, Respiratory        MRSA 01/22/21 02/17/22 01/20/23 MRSA DNA Probe, Nasal        Added from external infection.     Resolved    COVID-19 (Rule Out) 03/17/23 03/17/23 03/17/23 COVID-19, Rapid (Ordered)   03/17/23 Rule-Out Test Resulted    COVID-19 (Rule Out) 02/25/23 02/25/23 02/25/23 COVID-19 & Influenza Combo (Ordered)   02/25/23 Rule-Out Test Resulted    COVID-19 (Rule Out) 01/19/23 01/19/23 01/19/23 COVID-19 & Influenza Combo (Ordered)   01/19/23 Rule-Out Test Resulted    COVID-19 (Rule Out) 10/25/22 10/25/22 10/26/22 COVID-19, Rapid (Ordered)   10/26/22 2200   Wound Cleansed Cleansed with saline 02/28/23 1754   Dressing/Treatment Foam;Silver dressing 03/20/23 2200   Dressing Change Due 03/01/23 02/28/23 1754   Wound Length (cm) 5 cm 02/26/23 0115   Wound Width (cm) 1 cm 02/26/23 0115   Wound Surface Area (cm^2) 5 cm^2 02/26/23 0115   Distance Tunneling (cm) 0 cm 02/28/23 1754   Undermining Maxium Distance (cm) 0 02/28/23 1754   Wound Assessment Denuded 03/20/23 2200   Drainage Amount Small 03/20/23 2200   Drainage Description Yellow 03/20/23 2200   Odor None 03/20/23 2200   Viviana-wound Assessment Intact 03/20/23 2200   Margins Attached edges 03/20/23 2200   Number of days: 24       Incision 05/10/22 Abdomen Medial (Active)   Number of days: 314        Elimination:  Continence: Bowel: Yes  Bladder: Anuric  Urinary Catheter: None   Colostomy/Ileostomy/Ileal Conduit: No       Date of Last BM: 3/21/23    Intake/Output Summary (Last 24 hours) at 3/21/2023 0912  Last data filed at 3/21/2023 0829  Gross per 24 hour   Intake 360 ml   Output --   Net 360 ml     I/O last 3 completed shifts: In: 240 [P.O.:240]  Out: -     Safety Concerns:     None    Impairments/Disabilities:      None    Nutrition Therapy:  Current Nutrition Therapy:   - Oral Diet:  Dysphagia - Pureed    Routes of Feeding: Oral  Liquids:  Thin Liquids  Daily Fluid Restriction: yes - amount 1500 FR  Last Modified Barium Swallow with Video (Video Swallowing Test): done on 1/23/23/     Treatments at the Time of Hospital Discharge:   Respiratory Treatments:   Oxygen Therapy:   Trach/Vent  Ventilator:    - Ventilator Settings:    Vt (Set, mL): 400 mL  Resp Rate (Set): 20 bmp  FiO2 : 40 %    PEEP/CPAP (cmH2O): 5       Rehab Therapies: NA  Weight Bearing Status/Restrictions: No weight bearing restrictions - BEDREST - Non-Ambulatory  Other Medical Equipment (for information only, NOT a DME order):  NA  Other Treatments: Vancomycin admiinstration    Patient's personal belongings (please select all that are sent with

## 2023-03-21 NOTE — PROGRESS NOTES
Patient educated on discharge instructions as well as new medications use, dosage, administration and possible side effects. Patient verified knowledge. IV removed without difficulty and dry dressing in place. Telemetry monitor removed and returned to [facility]. Pt left facility in stable condition to [facility] with all of their personal belongings.

## 2023-03-21 NOTE — DISCHARGE SUMMARY
ELIQUIS     atorvastatin 40 MG tablet  Commonly known as: LIPITOR  Take 1 tablet by mouth nightly     BISACODYL RE     calcium acetate 667 MG Tabs     carboxymethylcellulose PF 1 % Gel ophthalmic gel  Commonly known as: THERATEARS  Place 1 drop into both eyes every 4 hours as needed for Dry Eyes     chlorhexidine 0.12 % solution  Commonly known as: PERIDEX     diphenhydrAMINE 25 MG capsule  Commonly known as: BENADRYL     gabapentin 300 MG capsule  Commonly known as: NEURONTIN     hydrocortisone 1 % cream     hydrocortisone 2.5 % Crea rectal cream  Commonly known as: ANUSOL-HC  Place rectally 2 times daily     ipratropium-albuterol 0.5-2.5 (3) MG/3ML Soln nebulizer solution  Commonly known as: DUONEB     loperamide 2 MG capsule  Commonly known as: IMODIUM     metoprolol tartrate 25 MG tablet  Commonly known as: LOPRESSOR  Take 1 tablet by mouth 2 times daily Hold for SBP less than 110     midodrine 5 MG tablet  Commonly known as: PROAMATINE     omeprazole 20 MG delayed release capsule  Commonly known as: PRILOSEC     polyethylene glycol 17 g packet  Commonly known as: GLYCOLAX     promethazine 12.5 MG tablet  Commonly known as: PHENERGAN     sennosides-docusate sodium 8.6-50 MG tablet  Commonly known as: SENOKOT-S     sertraline 50 MG tablet  Commonly known as: ZOLOFT     traZODone 50 MG tablet  Commonly known as: DESYREL     UNABLE TO FIND     vitamin B-12 500 MCG tablet  Commonly known as: CYANOCOBALAMIN            STOP taking these medications      furosemide 20 MG tablet  Commonly known as: LASIX     warfarin 3 MG tablet  Commonly known as: COUMADIN               Where to Get Your Medications        You can get these medications from any pharmacy    Bring a paper prescription for each of these medications  oxyCODONE 5 MG immediate release tablet       Information about where to get these medications is not yet available    Ask your nurse or doctor about these medications  vancomycin  infusion           Discharge Condition/Location: Stable to NH    Follow Up: Follow up with PCP.     Total time spent on discharge is 35 minutes      Ernie Damico MD 3/21/2023 9:13 AM

## 2023-03-21 NOTE — PROGRESS NOTES
RT Inhaler-Nebulizer Bronchodilator Protocol Note    There is a bronchodilator order in the chart from a provider indicating to follow the RT Bronchodilator Protocol and there is an Initiate RT Inhaler-Nebulizer Bronchodilator Protocol order as well (see protocol at bottom of note). CXR Findings:  XR CHEST PORTABLE    Result Date: 3/20/2023  Mild right basilar airspace disease. This could represent atelectasis or pneumonia in the appropriate clinical setting. The findings from the last RT Protocol Assessment were as follows:   History Pulmonary Disease: (P) Chronic pulmonary disease  Respiratory Pattern: (P) Mild dyspnea at rest, irregular pattern, or RR 21-25 bpm  Breath Sounds: (P) Slightly diminished and/or crackles  Cough: (P) Strong, spontaneous, non-productive  Indication for Bronchodilator Therapy: (P) Decreased or absent breath sounds  Bronchodilator Assessment Score: (P) 8    Aerosolized bronchodilator medication orders have been revised according to the RT Inhaler-Nebulizer Bronchodilator Protocol below. Respiratory Therapist to perform RT Therapy Protocol Assessment initially then follow the protocol. Repeat RT Therapy Protocol Assessment PRN for score 0-3 or on second treatment, BID, and PRN for scores above 3. No Indications - adjust the frequency to every 6 hours PRN wheezing or bronchospasm, if no treatments needed after 48 hours then discontinue using Per Protocol order mode. If indication present, adjust the RT bronchodilator orders based on the Bronchodilator Assessment Score as indicated below. Use Inhaler orders unless patient has one or more of the following: on home nebulizer, not able to hold breath for 10 seconds, is not alert and oriented, cannot activate and use MDI correctly, or respiratory rate 25 breaths per minute or more, then use the equivalent nebulizer order(s) with same Frequency and PRN reasons based on the score.   If a patient is on this medication at home then do not decrease Frequency below that used at home. 0-3 - enter or revise RT bronchodilator order(s) to equivalent RT Bronchodilator order with Frequency of every 4 hours PRN for wheezing or increased work of breathing using Per Protocol order mode. 4-6 - enter or revise RT Bronchodilator order(s) to two equivalent RT bronchodilator orders with one order with BID Frequency and one order with Frequency of every 4 hours PRN wheezing or increased work of breathing using Per Protocol order mode. 7-10 - enter or revise RT Bronchodilator order(s) to two equivalent RT bronchodilator orders with one order with TID Frequency and one order with Frequency of every 4 hours PRN wheezing or increased work of breathing using Per Protocol order mode. 11-13 - enter or revise RT Bronchodilator order(s) to one equivalent RT bronchodilator order with QID Frequency and an Albuterol order with Frequency of every 4 hours PRN wheezing or increased work of breathing using Per Protocol order mode. Greater than 13 - enter or revise RT Bronchodilator order(s) to one equivalent RT bronchodilator order with every 4 hours Frequency and an Albuterol order with Frequency of every 2 hours PRN wheezing or increased work of breathing using Per Protocol order mode. RT to enter RT Home Evaluation for COPD & MDI Assessment order using Per Protocol order mode.     Electronically signed by Priti Kang RCP on 3/21/2023 at 7:49 AM

## 2023-03-21 NOTE — DISCHARGE INSTRUCTIONS
Heart Failure Resources:    Heart Failure Interactive Workbook:   Go to www.[employer].com/aha-heartfailure for a Free Heart Failure Interactive Workbook provided by [employer]. This interactive workbook will provide information on Healthier Living with Heart Failure. Please copy and paste link into search bar. Use your mouse to scroll through the pages. HF [place] isadora:   Heart Failure Free smart phone isadora available for iPhone and Android download. Use your phone to track sodium intake, fluid intake, symptoms, and weight. Low Sodium Diet:  Go to www. [employer]. org website for [employer] which is Low Sodium! [employer] is a dialysis company, but this website offers free seasonal cookbooks. Each quarter, they will release 25-30 new recipes with a breakdown of calories, sodium, and glucose. Recipes:   Go to www.[employer]/recipes website for free recipes. that may impair breathing  Pace: Slow and steady pace, never rushing! Pursed lip breathing.   Pursed Lip Breathing: \"Smell the roses, blow out the candles\"

## 2023-03-21 NOTE — PROGRESS NOTES
Paper prescription sent to [facility], pt had no belongings in room to be sent back home with her.

## 2023-03-21 NOTE — PROGRESS NOTES
Pt returned from HD, transport here to take patient to [facility]. Vitals stable. Dr. Eliz Espinoza stated patient was okay to leave and go home.     Vitals:    03/21/23 1330   BP: (!) 122/53   Pulse: 75   Resp: 20   Temp: 98.9 °F (37.2 °C)   SpO2:

## 2023-03-21 NOTE — PROCEDURES
Treatment time: 3 hours  Net UF: 0 ml     Pre weight: 150.8kg kg bedscale  Post weight:149.9 kg bedscale  EDW: 122.5kg kg    Access used: RUE AVF    Access function: good with  ml/min     Medications or blood products given: Albumin 25g, Vancomycin 1500mg     Regular outpatient schedule: TTS     Summary of response to treatment: Patient tolerated treatment ok, hypotension at the beginning of tx. Patient remained stable after medications given, then the rest of treatment and upon exiting the multi suite via transport.      Report given to  Lashon Olivares RN and copy of dialysis treatment record placed in chart, to be scanned into EMR.    03/21/23 1030 03/21/23 1330   Treatment   Time On 1030  --    Time Off  --  1330   Treatment Goal 0  --    Vital Signs   BP (!) 102/37 (!) 122/53   Temp 99.1 °F (37.3 °C) 98.9 °F (37.2 °C)   Heart Rate (!) 111 75   Resp 20 20

## 2023-03-21 NOTE — PROGRESS NOTES
03/20/23 4002   Patient Observation   Heart Rate 84   Resp 20   SpO2 98 %   Breath Sounds   Right Upper Lobe Diminished   Right Middle Lobe Diminished   Right Lower Lobe Diminished   Left Upper Lobe Diminished   Left Lower Lobe Diminished   Vent Information   Vent Mode AC/VC   Ventilator Settings   FiO2  (S)  40 %   Vt (Set, mL) 400 mL   Resp Rate (Set) 20 bmp   PEEP/CPAP (cmH2O) 5   Vent Patient Data (Readings)   Vt (Measured) 403 mL   Peak Inspiratory Pressure (cmH2O) 28 cmH2O   Rate Measured 21 br/min   Minute Volume (L/min) 8.25 Liters   Peak Inspiratory Flow (lpm) 50 L/sec   Mean Airway Pressure (cmH2O) 11 cmH20   I:E Ratio 1:2.4   Flow Sensitivity 3 L/min   Vent Alarm Settings   High Pressure (cmH2O) 45 cmH2O   Low Minute Volume (lpm) 4 L/min   High Minute Volume (lpm) 20 L/min   Low Exhaled Vt (ml) 200 mL   High Exhaled Vt (ml) 900 mL   RR High (bpm) 40 br/min   Apnea (secs) 20 secs   Additional Respiratoray Assessments   Humidification Source Heated wire   Humidification Temp 37   Circuit Condensation Drained   Airway Clearance   Suction Trach   Subglottic Suction Done Yes   Suction Device Inline suction catheter   Sputum Method Obtained Tracheal   Sputum Amount Scant   Sputum Color/Odor Tan; White   Sputum Consistency Thick   Treatment   $Treatment Type $Inhaled Therapy/Meds

## 2023-03-21 NOTE — PROGRESS NOTES
Vitals:    03/21/23 0808   BP: (!) 103/47   Pulse: 87   Resp: 19   Temp: 99.9 °F (37.7 °C)   SpO2: 99%     Pt A&O x 4 this morning, eating breakfast independently. Pt checked and changed this morning per PCA. Pt going to HD this morning, then likely to discharge later this afternoon.

## 2023-03-21 NOTE — DISCHARGE INSTR - DIET

## 2023-03-21 NOTE — PROGRESS NOTES
PELVIS WO CONTRAST Additional Contrast? None   Final Result   1. Bilateral pleural effusions and consolidation at the bases. 2. Diffuse edema subcutaneous tissues most pronounced of the right breast   which is incompletely evaluated. 3. Multiple nodules in the kidneys bilaterally. 4. Multiple fibroids of the uterus. 5. Cholelithiasis. XR CHEST PORTABLE   Final Result   Mild right basilar airspace disease. This could represent atelectasis or   pneumonia in the appropriate clinical setting.                Assessment & Plan:     Patient Active Problem List    Diagnosis Date Noted    Septicemia ([address] 03/20/2023    Atrial fibrillation ([address] 03/20/2023    Hx of deep venous thrombosis 03/20/2023    Dialysis patient ([address] 02/27/2023    Chronic anemia 02/27/2023    Chronic respiratory failure with hypoxia ([address] 02/27/2023    ESRD (end stage renal disease) ([address] 02/27/2023    Chronic respiratory failure, unsp w hypoxia or hypercapnia (HCC) 02/27/2023    Hyperkalemia 02/25/2023    Septic shock ([address] 01/22/2023    Severe sepsis ([address] 01/21/2023    Mucus plug in respiratory tract 01/21/2023    Aspiration of foreign body 01/21/2023    Leukocytosis 01/20/2023    Macrocytic anemia 01/20/2023    Acute encephalopathy 01/20/2023    HCAP (healthcare-associated pneumonia) 01/20/2023    Tracheostomy dependence ([address] 10/27/2022    Coronary artery disease involving native heart with unstable angina pectoris ([address] 09/19/2022    Mixed hyperlipidemia 09/19/2022    ESRD on hemodialysis ([address] 09/19/2022    Chest pain 09/15/2022    Fecal impaction (HCC)     SBO (small bowel obstruction) ([address] 05/06/2022    Vaginal bleeding 04/26/2022    Abnormal chest x-ray     Hospital-acquired pneumonia     Unresponsive episode     Syncope     Acute respiratory failure with hypoxia and hypercapnia (HCC)     Pneumonia due to infectious organism     ESRD (end stage renal disease) on dialysis ([address]     Chronic respiratory failure requiring continuous mechanical ventilation through tracheostomy ([facility]     Acute on chronic respiratory failure with hypoxia (HCC)     Acute pulmonary edema (HCC)     Rectal pain     History of CVA (cerebrovascular accident)     Anxiety 10/10/2021    GERD (gastroesophageal reflux disease) 10/10/2021    Morbid obesity with BMI of 50.0-59.9, adult ([facility] 10/10/2021    Anemia, chronic disease 02/08/2021    SILVER (obstructive sleep apnea) 01/31/2021    Dysphagia, pharyngeal phase 77/63/8798    Diastolic heart failure ([facility] 10/23/2018    Primary hypertension 08/03/2017     #Bacteremia   -do not have underlying source? Possible pulm?  -cultures as above with enterococcus faecalis sensitive to vanc   -IV vancomycin D #2   -procalc elevated but lower than possible   -urine studies pending  -sputum culture pending  -CT abdomen reviewed   -per nephearl and Dr. Michelle freeman for vancomycin with dialysis 2/2 to unable to obtain good access   -pulmonology consulted      #Chronic respiratory failure- vent-trach dependent   -stable on current settings  -pulmonology consulted for management     #Anemia of chronic disease   -Hgb ~ baseline   -denies any active bleeding   -continue to monitor      #ESRD on HD   -Tues, Thurs, Sat   -on phoslo   -renal diet   -nephrology consulted      #Depression   -on zoloft      #DM type 2   -SSI   -monitor BG   -no home regimen      #Atrial fibrillation   -on lopressor   -on eliquis for [facility]      #CAD   -on ASA, statin, BB      #Hypotension   -on midodrine 3x weekly      #Chronic diastolic HF   -last echo as above      #Hx of DVT   -on eliquis      #Hx of CVA   -on eliquis and statin      #Chronic pain   -on gabapentin and oxycodone      DVT Prophylaxis: Eliquis   Diet: ADULT DIET; Dysphagia - Pureed; Low Potassium (Less than 3000 mg/day);  Low Phosphorus (Less than 1000 mg)  Code Status: Full Code      Dc on IV vanc after HD for 3 more doses     Joe Worthington MD

## 2023-03-21 NOTE — PROGRESS NOTES
the access center. Chronic respiratory failure   S/p trach      Bacteremia:   No clear symptoms. Enterococcus which is sensitive to vancomycin  CT abdomen with contrast with no clear source      Hyperkalemia.  - Potassium level here is high despite partial dialysis, s/p course of lokelma  - ? Some recirculation due to access stenosis/ No EKG changes      Anemia.  - Continue KOKO with HD.   Below target   - On Micera 200 mcg IV [id number]    Plan/     HD today  IV Vancomycin   Continue IV vancomycin with dialysis as does not have IV access  Will need 2 weeks coverage and then can recheck cultures         Thank you for asking us to participate in the management of your patient, please do not hesitate to contact me for any concerns regarding my recommendations as outlined above.    -----------------------------  Brittany Mirza M.D.   [facility]

## 2023-03-21 NOTE — PROGRESS NOTES
Pt left floor for HD, BP soft. Vanc delivered to HD nurse to be given through fistula. CMU aware patient is off the floor.

## 2023-03-21 NOTE — CARE COORDINATION
DISCHARGE ORDER  Date/Time 3/21/2023 11:43 AM  Completed by: Denise Sanz, RN, Case Management    Patient Name: Toya Luna    : 1950      Admit order Date and Status:INPT 3/20/23  Noted discharge order. (verify MD's last order for status of admission/Traditional Medicare 3 [place] Inpatient qualifying stay required for SNF)    Confirmed discharge plan with:              Patient:  Yes              When pt confirms DC plan does any support person need to be contacted by CM Yes if yes who_pt, pt dtr Keisha_____                      Discharge to Facility:  [place] phone number for staff giving report: [phone]   Pre-certification completed: [facility] Exemption Notification (HENS) completed: n/a   Discharge orders and Continuity of Care faxed to facility:  facility to obtain from [employer]. Transportation:               Medical Transport explained with choice list offered to pt/family. Choice:(no preference)  Agency used: [employer]   time:   2599-9909      Pt/family/Nursing/Facility aware of  time:   Yes Names: pt, dtr-Keisha, nurse, , facility  Ambulance form completed:  yes:      Date Last IMM Given: 3/20/23    Comments:-    [facility] being [facility]'d to [facility]. Pt's O2 sats are 98% on 40% FiO2. Bonnie King Discharge timeout done with [facility]. All discharge needs and concerns addressed. Discharging nurse to complete AMADEO, reconcile AVS, and place final copy with patient's discharge packet. Discharging RN to ensure that written prescriptions for  Level II medications are sent with patient to the facility as per protocol.

## 2023-03-21 NOTE — PROGRESS NOTES
03/21/23 0303   Patient Observation   Heart Rate 81   Resp 20   SpO2 96 %   Breath Sounds   Right Upper Lobe Diminished   Right Middle Lobe Diminished   Right Lower Lobe Diminished   Left Upper Lobe Diminished   Left Lower Lobe Diminished   Vent Information   Vent Mode AC/VC   Ventilator Settings   FiO2  40 %   Vt (Set, mL) 400 mL   Resp Rate (Set) 20 bmp   PEEP/CPAP (cmH2O) 5   Vent Patient Data (Readings)   Vt (Measured) 389 mL   Peak Inspiratory Pressure (cmH2O) 32 cmH2O   Rate Measured 20 br/min   Minute Volume (L/min) 7.8 Liters   Peak Inspiratory Flow (lpm) 50 L/sec   Mean Airway Pressure (cmH2O) 12 cmH20   I:E Ratio 1:2.4   Flow Sensitivity 3 L/min   Vent Alarm Settings   High Pressure (cmH2O) 45 cmH2O   Low Minute Volume (lpm) 4 L/min   High Minute Volume (lpm) 20 L/min   Low Exhaled Vt (ml) 200 mL   High Exhaled Vt (ml) 900 mL   RR High (bpm) 40 br/min   Apnea (secs) 20 secs   Treatment   $Treatment Type $Inhaled Therapy/Meds

## 2023-03-21 NOTE — PROGRESS NOTES
Speech Language Pathology  SLP Brief    Name: Ludwig Reyes  : 1950  Medical Diagnosis: Bacteremia [R78.81]  Septicemia ([facility] [A41.9]  Ventilator associated pneumonia ([facility] [J95.851]  ESRD (end stage renal disease) on dialysis ([facility] [N18.6, Z99.2]      0950: Orders received, chart reviewed. Pt's most recent swallow evaluation here was 23 per FEES, with recommendations for Soft and Bite sized solids, thin liquids with cues for slow rate of intake as pt noted to be impulsive with self-feeding rate. It was recommended that PO only be offered when pt tolerating cuff deflation and use of PMV for improved oropharyngeal sensation. Pt is off the unit for dialysis. Note order placed for pt to return to her ECF. Will follow for assessment as pt is available. 1354: second attempt. Pt transportation service present and awaiting pt's return from HD to return to her ECF. Evaluation deferred with impeding discharge. Discussed with MOODY Lopez MS CCC-SLP  Speech Language Pathologist

## 2023-03-21 NOTE — PLAN OF CARE
Problem: Discharge Planning  Goal: Discharge to home or other facility with appropriate resources  3/21/2023 0812 by Joel Sahu RN  Outcome: Progressing  3/21/2023 0213 by Joel Sahu RN  Outcome: Progressing     Problem: Safety - Adult  Goal: Free from fall injury  3/21/2023 0812 by Joel Sahu RN  Outcome: Progressing  3/21/2023 0213 by Joel Sahu RN  Outcome: Progressing     Problem: ABCDS Injury Assessment  Goal: Absence of physical injury  3/21/2023 2948 by Joel Sahu RN  Outcome: Progressing  3/21/2023 0213 by Joel Sahu RN  Outcome: Progressing     Problem: Skin/Tissue Integrity  Goal: Absence of new skin breakdown  Description: 1. Monitor for areas of redness and/or skin breakdown  2. Assess vascular access sites hourly  3. Every 4-6 hours minimum:  Change oxygen saturation probe site  4. Every 4-6 hours:  If on nasal continuous positive airway pressure, respiratory therapy assess nares and determine need for appliance change or resting period.   3/21/2023 8305 by Joel Sahu RN  Outcome: Progressing  3/21/2023 0213 by Joel Sahu RN  Outcome: Progressing     Problem: Chronic Conditions and Co-morbidities  Goal: Patient's chronic conditions and co-morbidity symptoms are monitored and maintained or improved  Outcome: Progressing     Problem: Cardiovascular - Adult  Goal: Maintains optimal cardiac output and hemodynamic stability  Outcome: Progressing  Goal: Absence of cardiac dysrhythmias or at baseline  Outcome: Progressing

## 2023-03-21 NOTE — PROGRESS NOTES
4 Eyes Skin Assessment     The patient is being assess for   Initial shift assessment    I agree that 2 RN's have performed a thorough Head to Toe Skin Assessment on the patient. ALL assessment sites listed below have been assessed. Areas assessed by both nurses:   [x]   Head, Face, and Ears   [x]   Shoulders, Back, and Chest, Abdomen  [x]   Arms, Elbows, and Hands   [x]   Coccyx, Sacrum, and Ischium  [x]   Legs, Feet, and Heels        -Skin tear to L upper chest, abrasion/tear to R FA.    -Stage 2 area below R shoulder blade. Opticell AG and mepilex placed.  -Skin shearing with drainage under R breast.  Opticell AG and mepilex placed.  -Skin shearing to RUQ directly across from skin shearing area under R breast.      Under R breast    R side of breast/bottom of R shoulder blade    Side of R breast      **SHARE this note so that the co-signing nurse is able to place an eSignature**    Co-signer eSignature: Electronically signed by Nilam Flores RN on 3/21/23 at 2:15 AM EDT    Does the Patient have Skin Breakdown?   Yes LDA WOUND CARE was Initiated documentation include the Viviana-wound, Wound Assessment, Measurements, Dressing Treatment, Drainage, and Color\",          Stu Prevention initiated:  Yes   Wound Care Orders initiated:  No      WOC nurse consulted for Pressure Injury (Stage 3,4, Unstageable, DTI, NWPT, Complex wounds)and New or Established Ostomies:  No      Primary Nurse eSignature: Electronically signed by Nancy Hernandez RN on 3/20/23 at 10:16 PM EDT

## 2023-03-21 NOTE — PLAN OF CARE
Problem: Discharge Planning  Goal: Discharge to home or other facility with appropriate resources  3/21/2023 1023 by Karen Mims RN  Outcome: Progressing  3/21/2023 0812 by Jose E Almonte RN  Outcome: Progressing  3/21/2023 0213 by Jose E Almonte RN  Outcome: Progressing     Problem: Safety - Adult  Goal: Free from fall injury  3/21/2023 1023 by Karen Mims RN  Outcome: Progressing  3/21/2023 0812 by Jose E Almonte RN  Outcome: Progressing  3/21/2023 0213 by Jose E Almonte RN  Outcome: Progressing     Problem: ABCDS Injury Assessment  Goal: Absence of physical injury  3/21/2023 0812 by Jose E Almonte RN  Outcome: Progressing  3/21/2023 0213 by Jose E Almonte RN  Outcome: Progressing     Problem: Skin/Tissue Integrity  Goal: Absence of new skin breakdown  Description: 1. Monitor for areas of redness and/or skin breakdown  2. Assess vascular access sites hourly  3. Every 4-6 hours minimum:  Change oxygen saturation probe site  4. Every 4-6 hours:  If on nasal continuous positive airway pressure, respiratory therapy assess nares and determine need for appliance change or resting period.   3/21/2023 9333 by Jose E Almonte RN  Outcome: Progressing  3/21/2023 0213 by Jose E Almonte RN  Outcome: Progressing     Problem: Chronic Conditions and Co-morbidities  Goal: Patient's chronic conditions and co-morbidity symptoms are monitored and maintained or improved  3/21/2023 0812 by Jose E Almonte RN  Outcome: Progressing     Problem: Cardiovascular - Adult  Goal: Maintains optimal cardiac output and hemodynamic stability  3/21/2023 1023 by Karen Mims RN  Outcome: Progressing  3/21/2023 0812 by Jose E Almonte RN  Outcome: Progressing  Goal: Absence of cardiac dysrhythmias or at baseline  3/21/2023 1023 by Karen Mims RN  Outcome: Progressing  3/21/2023 0812 by Jose E Almonte RN  Outcome: Progressing

## 2023-03-25 LAB
BACTERIA BLD CULT ORG #2: NORMAL
BACTERIA BLD CULT: NORMAL

## 2023-03-30 NOTE — PROGRESS NOTES
Physician Progress Note      PATIENT:               Lazaro Wild  CSN #:                  [id number]  :                       1950  ADMIT DATE:       3/20/2023 10:38 AM  DISCH DATE:        3/21/2023 2:47 PM  RESPONDING  PROVIDER #:        Kelsy Gabriel MD          QUERY TEXT:    Pt admitted with bacteremia. Noted documentation of sepsis per ED and reasons   for exams and septicemia as principle problem. If possible, please document   in progress notes and discharge summary:    The medical record reflects the following:  Risk Factors: bacteremia  Clinical Indicators: D/C notes- Bacteremia -do not have underlying source? Possible pulm?, cultures as above with enterococcus faecalis. temp 99.3 and   WBC 11.3  Treatment: -IV vancomycin , nephrology and pulmonology consult    Thank You Karen Reyes RN, [email or web address]  Options provided:  -- Bacterial infection confirmed present on admission and sepsis ruled out  -- Bacterial infection confirmed present on admission and sepsis ruled in as   evidenced by, please document. -- Other - I will add my own diagnosis  -- Disagree - Not applicable / Not valid  -- Disagree - Clinically unable to determine / Unknown  -- Refer to Clinical Documentation Reviewer    PROVIDER RESPONSE TEXT:    The diagnosis of bacterial infection was confirmed present on admission and   sepsis ruled out.     Query created by: Gsu Madsen on 3/30/2023 2:39 PM      Electronically signed by:  Kelsy Gabriel MD 3/30/2023 4:43 PM

## 2023-05-14 NOTE — PROGRESS NOTES
Treatment time: 3    Net UF: 1600    Pre weight: 118.9  Post weight: 117.4  EDW: tbd    Access used: avf  Access function: well    Medications or blood products given: epogen [id number]    Regular outpatient schedule: TTS    Summary of response to treatment: tolerated fair. BP decrease at times, goal decreased. Pt yelling out whole tx not wanting to do HD. Pt off slightly early d/t pt insistent on coming off HD. 1:1 without effects. Report called to primary RN. Pt stable upon leaving unit    Copy of dialysis treatment record placed in chart, to be scanned into EMR. No

## 2023-07-07 ENCOUNTER — APPOINTMENT (OUTPATIENT)
Dept: GENERAL RADIOLOGY | Age: 73
End: 2023-07-07
Payer: COMMERCIAL

## 2023-07-07 ENCOUNTER — HOSPITAL ENCOUNTER (EMERGENCY)
Age: 73
Discharge: NURSING, REHAB OR LONG-TERM CARE | End: 2023-07-07
Attending: EMERGENCY MEDICINE
Payer: COMMERCIAL

## 2023-07-07 VITALS
TEMPERATURE: 97.5 F | RESPIRATION RATE: 18 BRPM | BODY MASS INDEX: 57.06 KG/M2 | OXYGEN SATURATION: 100 % | SYSTOLIC BLOOD PRESSURE: 107 MMHG | HEART RATE: 69 BPM | DIASTOLIC BLOOD PRESSURE: 55 MMHG | HEIGHT: 64 IN

## 2023-07-07 DIAGNOSIS — Z43.0 ENCOUNTER FOR TRACHEOSTOMY TUBE CHANGE (HCC): Primary | ICD-10-CM

## 2023-07-07 LAB
ALBUMIN SERPL-MCNC: 2.9 G/DL (ref 3.4–5)
ALBUMIN/GLOB SERPL: 0.6 {RATIO} (ref 1.1–2.2)
ALP SERPL-CCNC: 126 U/L (ref 40–129)
ALT SERPL-CCNC: 17 U/L (ref 10–40)
ANION GAP SERPL CALCULATED.3IONS-SCNC: 13 MMOL/L (ref 3–16)
AST SERPL-CCNC: 27 U/L (ref 15–37)
BASOPHILS # BLD: 0.1 K/UL (ref 0–0.2)
BASOPHILS NFR BLD: 1.2 %
BILIRUB SERPL-MCNC: 0.5 MG/DL (ref 0–1)
BUN SERPL-MCNC: 52 MG/DL (ref 7–20)
CALCIUM SERPL-MCNC: 9.1 MG/DL (ref 8.3–10.6)
CHLORIDE SERPL-SCNC: 95 MMOL/L (ref 99–110)
CO2 SERPL-SCNC: 24 MMOL/L (ref 21–32)
CREAT SERPL-MCNC: 6 MG/DL (ref 0.6–1.2)
DEPRECATED RDW RBC AUTO: 18.8 % (ref 12.4–15.4)
EOSINOPHIL # BLD: 0.1 K/UL (ref 0–0.6)
EOSINOPHIL NFR BLD: 2.2 %
GFR SERPLBLD CREATININE-BSD FMLA CKD-EPI: 7 ML/MIN/{1.73_M2}
GLUCOSE SERPL-MCNC: 205 MG/DL (ref 70–99)
HCT VFR BLD AUTO: 39.7 % (ref 36–48)
HGB BLD-MCNC: 12 G/DL (ref 12–16)
LYMPHOCYTES # BLD: 0.7 K/UL (ref 1–5.1)
LYMPHOCYTES NFR BLD: 10.1 %
MCH RBC QN AUTO: 29.4 PG (ref 26–34)
MCHC RBC AUTO-ENTMCNC: 30.1 G/DL (ref 31–36)
MCV RBC AUTO: 97.5 FL (ref 80–100)
MONOCYTES # BLD: 0.4 K/UL (ref 0–1.3)
MONOCYTES NFR BLD: 6.6 %
NEUTROPHILS # BLD: 5.4 K/UL (ref 1.7–7.7)
NEUTROPHILS NFR BLD: 79.9 %
PLATELET # BLD AUTO: 136 K/UL (ref 135–450)
PLATELET BLD QL SMEAR: ADEQUATE
PMV BLD AUTO: 8.3 FL (ref 5–10.5)
POTASSIUM SERPL-SCNC: 5.9 MMOL/L (ref 3.5–5.1)
PROT SERPL-MCNC: 7.7 G/DL (ref 6.4–8.2)
RBC # BLD AUTO: 4.07 M/UL (ref 4–5.2)
SLIDE REVIEW: ABNORMAL
SODIUM SERPL-SCNC: 132 MMOL/L (ref 136–145)
WBC # BLD AUTO: 6.7 K/UL (ref 4–11)

## 2023-07-07 PROCEDURE — 6360000002 HC RX W HCPCS: Performed by: EMERGENCY MEDICINE

## 2023-07-07 PROCEDURE — 96374 THER/PROPH/DIAG INJ IV PUSH: CPT

## 2023-07-07 PROCEDURE — 31502 CHANGE OF WINDPIPE AIRWAY: CPT

## 2023-07-07 PROCEDURE — 36415 COLL VENOUS BLD VENIPUNCTURE: CPT

## 2023-07-07 PROCEDURE — 96375 TX/PRO/DX INJ NEW DRUG ADDON: CPT

## 2023-07-07 PROCEDURE — 71045 X-RAY EXAM CHEST 1 VIEW: CPT

## 2023-07-07 PROCEDURE — 99285 EMERGENCY DEPT VISIT HI MDM: CPT

## 2023-07-07 PROCEDURE — 85025 COMPLETE CBC W/AUTO DIFF WBC: CPT

## 2023-07-07 PROCEDURE — 80053 COMPREHEN METABOLIC PANEL: CPT

## 2023-07-07 RX ORDER — LORAZEPAM 2 MG/ML
1 INJECTION INTRAMUSCULAR ONCE
Status: COMPLETED | OUTPATIENT
Start: 2023-07-07 | End: 2023-07-07

## 2023-07-07 RX ORDER — FENTANYL CITRATE 50 UG/ML
75 INJECTION, SOLUTION INTRAMUSCULAR; INTRAVENOUS ONCE
Status: COMPLETED | OUTPATIENT
Start: 2023-07-07 | End: 2023-07-07

## 2023-07-07 RX ADMIN — LORAZEPAM 1 MG: 2 INJECTION INTRAMUSCULAR; INTRAVENOUS at 17:53

## 2023-07-07 RX ADMIN — FENTANYL CITRATE 75 MCG: 50 INJECTION, SOLUTION INTRAMUSCULAR; INTRAVENOUS at 17:54

## 2023-07-07 ASSESSMENT — PULMONARY FUNCTION TESTS
PIF_VALUE: 40
PIF_VALUE: 34

## 2023-07-07 ASSESSMENT — PAIN SCALES - GENERAL: PAINLEVEL_OUTOF10: 0

## 2023-07-07 NOTE — PROGRESS NOTES
07/07/23 1613   Adult IBW   Height 5' 4\" (1.626 m)   IBW/kg (Calculated) 54.7   Patient Observation   Pulse 50   SpO2 97 %   Breath Sounds   Right Upper Lobe Diminished   Right Middle Lobe Diminished   Right Lower Lobe Diminished   Left Upper Lobe Diminished   Left Lower Lobe Diminished   Vent Information   Vent Mode AC/VC   $Ventilation $Initial Day   Ventilator Settings   FiO2  40 %   Vt (Set, mL) 400 mL   Resp Rate (Set) 18 bmp   PEEP/CPAP (cmH2O) 5   Vent Patient Data (Readings)   Vt Spont (mL) 406 mL   Peak Inspiratory Pressure (cmH2O) 34 cmH2O   Rate Measured 22 br/min   Minute Volume (L/min) 8.93 Liters   Peak Expiratory Flow (lpm) 60 L/min   Mean Airway Pressure (cmH2O) 13 cmH20   I:E Ratio 1:2   Vent Alarm Settings   High Pressure (cmH2O) 40 cmH2O   Low Minute Volume (lpm) 2 L/min   High Minute Volume (lpm) 20 L/min   Low Exhaled Vt (ml) 210 mL   High Exhaled Vt (ml) 1000 mL   RR High (bpm) 40 br/min   Apnea (secs) 20 secs   Airway Clearance   Suction ET Tube   Suction Device Inline suction catheter   Sputum Amount Small

## 2023-07-07 NOTE — PROGRESS NOTES
Pt came in through ER, pt pulled out trach. EMT attempted size 6 unsuccessfully, upon arrival pt had a lot bleeding around site. Pt is vent dependent, had to do emergency trach replacement with 6 Shiley xlt distal cuffed. Which is what she wears normally. Er doctor at bedside. Trach change successful by RT. Placed on vent. Pt stable at this time.

## 2023-07-07 NOTE — PROGRESS NOTES
Called to ER for continuous peak pressure alarm. Patient not receiving appropriate VT at this time. After conversation with Dr. Luan Belcher, patient placed in PC ventilation with targeted VT of 342mL. Current settings are ACPC/18/35/1.05/40%/+5.

## 2023-07-07 NOTE — ED PROVIDER NOTES
Emergency Department Attending Physician Note  Location: [address] [place] [address] ED  7/7/2023       Pt Name: Padmini Mantilla  MRN: [id number]  [address] [facility] [place], [facility] 1950    Date of evaluation: 7/7/2023  Provider: Sulma Hatfield DO  PCP: Vicky Watson MD    Note Started: 3:52 PM EDT 7/7/23    CHIEF COMPLAINT  Chief Complaint   Patient presents with    Tracheostomy Tube Change     Pt brought in by EMS due to pulling out trach cannula. [facility] advised that they replaced trach cannula with size smaller. Pt states she pulled it out but unintentional. EMS was bagging pt upon arrival. RT at bedside bagging pt during triage. HISTORY OF PRESENT ILLNESS:  History obtained by patient. Limitations to history : None. Padmini Mantilla is a 68 y.o. female with a significant PMHx of preeti failure, chronically ventilated with a trach, history of previous DVT PE, other comorbidities as below, presenting emergency department today after she removed her trach at her nursing home. Patient wears a 6 cuffed trach, and they were able to immediately replace it with a 5 uncuffed trach tube and bring her to the emergency department. Patient has a little bit of blood oozing around the site, but no hemorrhage reported on scene, no obvious hemorrhage on arrival.  Patient is being bagged easily, has bilateral breath sounds, and is 96%. She is at her baseline mentation, well-known to staff. Otherwise, no recent concerns. History is limited secondary to patient's nonverbal status. Staff on scene say this was a complete accident. Nursing Notes were all reviewed and agreed with or any disagreements were addressed in the HPI.       MEDICAL HISTORY  Past Medical History:   Diagnosis Date    Acute and chronic respiratory failure (acute-on-chronic) (HCC)     Acute blood loss anemia 07/01/2020    Acute deep vein thrombosis (DVT) of brachial vein of left upper extremity ([address]) 02/20/2019    Formatting of this note might be different

## 2023-07-08 NOTE — PROGRESS NOTES
Pt discharged. Tried to call report to [facility], but no answer on her unit. Squad here, pt loaded up and switched to their vent with no issue.  Care transferred at this time

## 2023-07-08 NOTE — ED NOTES
Keisha Aguillon (Child)   [phone]    Calling requesting a updated.             Lang Ballard RN  07/07/23 2055

## 2023-07-11 ENCOUNTER — APPOINTMENT (OUTPATIENT)
Dept: GENERAL RADIOLOGY | Age: 73
End: 2023-07-11
Payer: COMMERCIAL

## 2023-07-11 ENCOUNTER — APPOINTMENT (OUTPATIENT)
Dept: CT IMAGING | Age: 73
End: 2023-07-11
Payer: COMMERCIAL

## 2023-07-11 ENCOUNTER — HOSPITAL ENCOUNTER (INPATIENT)
Age: 73
LOS: 3 days | Discharge: NURSING, REHAB OR LONG-TERM CARE | End: 2023-07-14
Attending: STUDENT IN AN ORGANIZED HEALTH CARE EDUCATION/TRAINING PROGRAM | Admitting: INTERNAL MEDICINE
Payer: COMMERCIAL

## 2023-07-11 DIAGNOSIS — Z99.2 ESRD ON DIALYSIS (HCC): Primary | ICD-10-CM

## 2023-07-11 DIAGNOSIS — F41.9 ANXIETY: ICD-10-CM

## 2023-07-11 DIAGNOSIS — E87.5 HYPERKALEMIA: ICD-10-CM

## 2023-07-11 DIAGNOSIS — N18.6 ESRD ON DIALYSIS (HCC): Primary | ICD-10-CM

## 2023-07-11 LAB
ALBUMIN SERPL-MCNC: 3.1 G/DL (ref 3.4–5)
ALBUMIN SERPL-MCNC: 3.1 G/DL (ref 3.4–5)
ALBUMIN/GLOB SERPL: 0.6 {RATIO} (ref 1.1–2.2)
ALBUMIN/GLOB SERPL: 0.7 {RATIO} (ref 1.1–2.2)
ALP SERPL-CCNC: 110 U/L (ref 40–129)
ALP SERPL-CCNC: 122 U/L (ref 40–129)
ALT SERPL-CCNC: 8 U/L (ref 10–40)
ALT SERPL-CCNC: 9 U/L (ref 10–40)
ANION GAP SERPL CALCULATED.3IONS-SCNC: 13 MMOL/L (ref 3–16)
ANION GAP SERPL CALCULATED.3IONS-SCNC: 14 MMOL/L (ref 3–16)
AST SERPL-CCNC: 8 U/L (ref 15–37)
AST SERPL-CCNC: 8 U/L (ref 15–37)
BASE EXCESS BLDV CALC-SCNC: -2.9 MMOL/L (ref -3–3)
BASOPHILS # BLD: 0.1 K/UL (ref 0–0.2)
BASOPHILS NFR BLD: 1.1 %
BILIRUB SERPL-MCNC: 0.5 MG/DL (ref 0–1)
BILIRUB SERPL-MCNC: 0.6 MG/DL (ref 0–1)
BUN SERPL-MCNC: 80 MG/DL (ref 7–20)
BUN SERPL-MCNC: 83 MG/DL (ref 7–20)
CALCIUM SERPL-MCNC: 9.4 MG/DL (ref 8.3–10.6)
CALCIUM SERPL-MCNC: 9.5 MG/DL (ref 8.3–10.6)
CHLORIDE SERPL-SCNC: 94 MMOL/L (ref 99–110)
CHLORIDE SERPL-SCNC: 95 MMOL/L (ref 99–110)
CO2 BLDV-SCNC: 28 MMOL/L
CO2 SERPL-SCNC: 25 MMOL/L (ref 21–32)
CO2 SERPL-SCNC: 26 MMOL/L (ref 21–32)
COHGB MFR BLDV: 1.6 % (ref 0–1.5)
CREAT SERPL-MCNC: 8 MG/DL (ref 0.6–1.2)
CREAT SERPL-MCNC: 8.3 MG/DL (ref 0.6–1.2)
DEPRECATED RDW RBC AUTO: 18.3 % (ref 12.4–15.4)
EKG ATRIAL RATE: 68 BPM
EKG DIAGNOSIS: NORMAL
EKG P AXIS: 61 DEGREES
EKG P-R INTERVAL: 258 MS
EKG Q-T INTERVAL: 416 MS
EKG QRS DURATION: 86 MS
EKG QTC CALCULATION (BAZETT): 442 MS
EKG R AXIS: -3 DEGREES
EKG T AXIS: 16 DEGREES
EKG VENTRICULAR RATE: 68 BPM
EOSINOPHIL # BLD: 0.3 K/UL (ref 0–0.6)
EOSINOPHIL NFR BLD: 4.3 %
GFR SERPLBLD CREATININE-BSD FMLA CKD-EPI: 5 ML/MIN/{1.73_M2}
GFR SERPLBLD CREATININE-BSD FMLA CKD-EPI: 5 ML/MIN/{1.73_M2}
GLUCOSE BLD-MCNC: 109 MG/DL (ref 70–99)
GLUCOSE BLD-MCNC: 131 MG/DL
GLUCOSE BLD-MCNC: 131 MG/DL (ref 70–99)
GLUCOSE BLD-MCNC: 140 MG/DL (ref 70–99)
GLUCOSE BLD-MCNC: 141 MG/DL
GLUCOSE BLD-MCNC: 153 MG/DL
GLUCOSE BLD-MCNC: 153 MG/DL (ref 70–99)
GLUCOSE BLD-MCNC: 217 MG/DL
GLUCOSE SERPL-MCNC: 101 MG/DL (ref 70–99)
GLUCOSE SERPL-MCNC: 161 MG/DL (ref 70–99)
HCO3 BLDV-SCNC: 25.8 MMOL/L (ref 23–29)
HCT VFR BLD AUTO: 38 % (ref 36–48)
HGB BLD-MCNC: 11.8 G/DL (ref 12–16)
LYMPHOCYTES # BLD: 1 K/UL (ref 1–5.1)
LYMPHOCYTES NFR BLD: 14 %
MCH RBC QN AUTO: 29.3 PG (ref 26–34)
MCHC RBC AUTO-ENTMCNC: 31.1 G/DL (ref 31–36)
MCV RBC AUTO: 94 FL (ref 80–100)
METHGB MFR BLDV: 0.3 %
MONOCYTES # BLD: 0.4 K/UL (ref 0–1.3)
MONOCYTES NFR BLD: 5.5 %
NEUTROPHILS # BLD: 5.2 K/UL (ref 1.7–7.7)
NEUTROPHILS NFR BLD: 75.1 %
NT-PROBNP SERPL-MCNC: ABNORMAL PG/ML (ref 0–124)
O2 CT VFR BLDV CALC: 10 VOL %
O2 THERAPY: ABNORMAL
PCO2 BLDV: 63.3 MMHG (ref 40–50)
PERFORMED ON: ABNORMAL
PH BLDV: 7.23 [PH] (ref 7.35–7.45)
PLATELET # BLD AUTO: 148 K/UL (ref 135–450)
PMV BLD AUTO: 8.4 FL (ref 5–10.5)
PO2 BLDV: 34.3 MMHG (ref 25–40)
POTASSIUM SERPL-SCNC: 6.1 MMOL/L (ref 3.5–5.1)
POTASSIUM SERPL-SCNC: 6.1 MMOL/L (ref 3.5–5.1)
PROT SERPL-MCNC: 7.8 G/DL (ref 6.4–8.2)
PROT SERPL-MCNC: 8.5 G/DL (ref 6.4–8.2)
RBC # BLD AUTO: 4.04 M/UL (ref 4–5.2)
REASON FOR REJECTION: NORMAL
REJECTED TEST: NORMAL
SAO2 % BLDV: 54 %
SODIUM SERPL-SCNC: 132 MMOL/L (ref 136–145)
SODIUM SERPL-SCNC: 135 MMOL/L (ref 136–145)
TROPONIN, HIGH SENSITIVITY: 208 NG/L (ref 0–14)
TROPONIN, HIGH SENSITIVITY: 214 NG/L (ref 0–14)
TROPONIN, HIGH SENSITIVITY: 217 NG/L (ref 0–14)
WBC # BLD AUTO: 7 K/UL (ref 4–11)

## 2023-07-11 PROCEDURE — 96374 THER/PROPH/DIAG INJ IV PUSH: CPT

## 2023-07-11 PROCEDURE — 6370000000 HC RX 637 (ALT 250 FOR IP)

## 2023-07-11 PROCEDURE — 2580000003 HC RX 258: Performed by: STUDENT IN AN ORGANIZED HEALTH CARE EDUCATION/TRAINING PROGRAM

## 2023-07-11 PROCEDURE — 2000000000 HC ICU R&B

## 2023-07-11 PROCEDURE — 85025 COMPLETE CBC W/AUTO DIFF WBC: CPT

## 2023-07-11 PROCEDURE — 70450 CT HEAD/BRAIN W/O DYE: CPT

## 2023-07-11 PROCEDURE — 83880 ASSAY OF NATRIURETIC PEPTIDE: CPT

## 2023-07-11 PROCEDURE — 80053 COMPREHEN METABOLIC PANEL: CPT

## 2023-07-11 PROCEDURE — 2580000003 HC RX 258

## 2023-07-11 PROCEDURE — 84484 ASSAY OF TROPONIN QUANT: CPT

## 2023-07-11 PROCEDURE — 94761 N-INVAS EAR/PLS OXIMETRY MLT: CPT

## 2023-07-11 PROCEDURE — 99285 EMERGENCY DEPT VISIT HI MDM: CPT

## 2023-07-11 PROCEDURE — 94640 AIRWAY INHALATION TREATMENT: CPT

## 2023-07-11 PROCEDURE — 6370000000 HC RX 637 (ALT 250 FOR IP): Performed by: INTERNAL MEDICINE

## 2023-07-11 PROCEDURE — 82803 BLOOD GASES ANY COMBINATION: CPT

## 2023-07-11 PROCEDURE — 99223 1ST HOSP IP/OBS HIGH 75: CPT | Performed by: INTERNAL MEDICINE

## 2023-07-11 PROCEDURE — 36415 COLL VENOUS BLD VENIPUNCTURE: CPT

## 2023-07-11 PROCEDURE — 6370000000 HC RX 637 (ALT 250 FOR IP): Performed by: STUDENT IN AN ORGANIZED HEALTH CARE EDUCATION/TRAINING PROGRAM

## 2023-07-11 PROCEDURE — 93005 ELECTROCARDIOGRAM TRACING: CPT | Performed by: STUDENT IN AN ORGANIZED HEALTH CARE EDUCATION/TRAINING PROGRAM

## 2023-07-11 PROCEDURE — 83036 HEMOGLOBIN GLYCOSYLATED A1C: CPT

## 2023-07-11 PROCEDURE — 93010 ELECTROCARDIOGRAM REPORT: CPT | Performed by: INTERNAL MEDICINE

## 2023-07-11 PROCEDURE — 5A1945Z RESPIRATORY VENTILATION, 24-96 CONSECUTIVE HOURS: ICD-10-PCS

## 2023-07-11 PROCEDURE — 94002 VENT MGMT INPAT INIT DAY: CPT

## 2023-07-11 PROCEDURE — 2700000000 HC OXYGEN THERAPY PER DAY

## 2023-07-11 PROCEDURE — 71045 X-RAY EXAM CHEST 1 VIEW: CPT

## 2023-07-11 RX ORDER — POLYETHYLENE GLYCOL 3350 17 G/17G
17 POWDER, FOR SOLUTION ORAL DAILY PRN
Status: DISCONTINUED | OUTPATIENT
Start: 2023-07-11 | End: 2023-07-14 | Stop reason: HOSPADM

## 2023-07-11 RX ORDER — CALCIUM GLUCONATE 20 MG/ML
1000 INJECTION, SOLUTION INTRAVENOUS ONCE
Status: COMPLETED | OUTPATIENT
Start: 2023-07-11 | End: 2023-07-12

## 2023-07-11 RX ORDER — GABAPENTIN 100 MG/1
100 CAPSULE ORAL 3 TIMES DAILY
Status: DISCONTINUED | OUTPATIENT
Start: 2023-07-11 | End: 2023-07-14 | Stop reason: HOSPADM

## 2023-07-11 RX ORDER — HYDROCORTISONE 25 MG/G
CREAM TOPICAL 2 TIMES DAILY
Status: DISCONTINUED | OUTPATIENT
Start: 2023-07-11 | End: 2023-07-14 | Stop reason: HOSPADM

## 2023-07-11 RX ORDER — SENNA AND DOCUSATE SODIUM 50; 8.6 MG/1; MG/1
2 TABLET, FILM COATED ORAL NIGHTLY
Status: DISCONTINUED | OUTPATIENT
Start: 2023-07-11 | End: 2023-07-14 | Stop reason: HOSPADM

## 2023-07-11 RX ORDER — DEXTROSE MONOHYDRATE 100 MG/ML
INJECTION, SOLUTION INTRAVENOUS CONTINUOUS PRN
Status: DISCONTINUED | OUTPATIENT
Start: 2023-07-11 | End: 2023-07-14 | Stop reason: HOSPADM

## 2023-07-11 RX ORDER — CALCIUM ACETATE 667 MG/1
3 CAPSULE ORAL
Status: DISCONTINUED | OUTPATIENT
Start: 2023-07-11 | End: 2023-07-14 | Stop reason: HOSPADM

## 2023-07-11 RX ORDER — LORAZEPAM 0.5 MG/1
0.5 TABLET ORAL EVERY 8 HOURS PRN
Status: ON HOLD | COMMUNITY
End: 2023-07-14 | Stop reason: SDUPTHER

## 2023-07-11 RX ORDER — BISACODYL 10 MG
10 SUPPOSITORY, RECTAL RECTAL DAILY PRN
Status: DISCONTINUED | OUTPATIENT
Start: 2023-07-11 | End: 2023-07-14 | Stop reason: HOSPADM

## 2023-07-11 RX ORDER — SODIUM CHLORIDE 0.9 % (FLUSH) 0.9 %
10 SYRINGE (ML) INJECTION PRN
Status: DISCONTINUED | OUTPATIENT
Start: 2023-07-11 | End: 2023-07-14 | Stop reason: HOSPADM

## 2023-07-11 RX ORDER — MIDODRINE HYDROCHLORIDE 10 MG/1
10 TABLET ORAL
Status: DISCONTINUED | OUTPATIENT
Start: 2023-07-12 | End: 2023-07-14 | Stop reason: HOSPADM

## 2023-07-11 RX ORDER — LOPERAMIDE HYDROCHLORIDE 2 MG/1
2 CAPSULE ORAL 4 TIMES DAILY PRN
COMMUNITY

## 2023-07-11 RX ORDER — INSULIN LISPRO 100 [IU]/ML
0-4 INJECTION, SOLUTION INTRAVENOUS; SUBCUTANEOUS
Status: DISCONTINUED | OUTPATIENT
Start: 2023-07-11 | End: 2023-07-14 | Stop reason: HOSPADM

## 2023-07-11 RX ORDER — SODIUM CHLORIDE 9 MG/ML
INJECTION, SOLUTION INTRAVENOUS PRN
Status: DISCONTINUED | OUTPATIENT
Start: 2023-07-11 | End: 2023-07-14 | Stop reason: HOSPADM

## 2023-07-11 RX ORDER — ACETAMINOPHEN 325 MG/1
650 TABLET ORAL EVERY 6 HOURS PRN
Status: DISCONTINUED | OUTPATIENT
Start: 2023-07-11 | End: 2023-07-14 | Stop reason: HOSPADM

## 2023-07-11 RX ORDER — DEXTROSE MONOHYDRATE 100 MG/ML
INJECTION, SOLUTION INTRAVENOUS CONTINUOUS PRN
Status: DISCONTINUED | OUTPATIENT
Start: 2023-07-11 | End: 2023-07-11

## 2023-07-11 RX ORDER — SODIUM CHLORIDE 0.9 % (FLUSH) 0.9 %
5-40 SYRINGE (ML) INJECTION EVERY 12 HOURS SCHEDULED
Status: DISCONTINUED | OUTPATIENT
Start: 2023-07-11 | End: 2023-07-14 | Stop reason: HOSPADM

## 2023-07-11 RX ORDER — GUAIFENESIN 600 MG/1
600 TABLET, EXTENDED RELEASE ORAL 2 TIMES DAILY
COMMUNITY

## 2023-07-11 RX ORDER — ACETAMINOPHEN 650 MG/1
650 SUPPOSITORY RECTAL EVERY 6 HOURS PRN
Status: DISCONTINUED | OUTPATIENT
Start: 2023-07-11 | End: 2023-07-14 | Stop reason: HOSPADM

## 2023-07-11 RX ORDER — INSULIN LISPRO 100 [IU]/ML
0-4 INJECTION, SOLUTION INTRAVENOUS; SUBCUTANEOUS NIGHTLY
Status: DISCONTINUED | OUTPATIENT
Start: 2023-07-11 | End: 2023-07-14 | Stop reason: HOSPADM

## 2023-07-11 RX ORDER — TRAZODONE HYDROCHLORIDE 50 MG/1
50 TABLET ORAL NIGHTLY
Status: DISCONTINUED | OUTPATIENT
Start: 2023-07-11 | End: 2023-07-14 | Stop reason: HOSPADM

## 2023-07-11 RX ORDER — OXYCODONE HYDROCHLORIDE 5 MG/1
5 TABLET ORAL 2 TIMES DAILY
Status: ON HOLD | COMMUNITY
End: 2023-07-14 | Stop reason: HOSPADM

## 2023-07-11 RX ORDER — ATORVASTATIN CALCIUM 40 MG/1
40 TABLET, FILM COATED ORAL NIGHTLY
Status: DISCONTINUED | OUTPATIENT
Start: 2023-07-11 | End: 2023-07-14 | Stop reason: HOSPADM

## 2023-07-11 RX ORDER — GABAPENTIN 300 MG/1
300 CAPSULE ORAL 3 TIMES DAILY
Status: DISCONTINUED | OUTPATIENT
Start: 2023-07-11 | End: 2023-07-11

## 2023-07-11 RX ORDER — CARBOXYMETHYLCELLULOSE SODIUM 10 MG/ML
1 GEL OPHTHALMIC EVERY 4 HOURS PRN
Status: DISCONTINUED | OUTPATIENT
Start: 2023-07-11 | End: 2023-07-14 | Stop reason: HOSPADM

## 2023-07-11 RX ORDER — MIDODRINE HYDROCHLORIDE 5 MG/1
5 TABLET ORAL EVERY 12 HOURS PRN
COMMUNITY

## 2023-07-11 RX ORDER — PANTOPRAZOLE SODIUM 40 MG/1
40 TABLET, DELAYED RELEASE ORAL
Status: DISCONTINUED | OUTPATIENT
Start: 2023-07-12 | End: 2023-07-14 | Stop reason: HOSPADM

## 2023-07-11 RX ORDER — LOPERAMIDE HYDROCHLORIDE 2 MG/1
2 CAPSULE ORAL 4 TIMES DAILY PRN
Status: DISCONTINUED | OUTPATIENT
Start: 2023-07-11 | End: 2023-07-14 | Stop reason: HOSPADM

## 2023-07-11 RX ORDER — ONDANSETRON 2 MG/ML
4 INJECTION INTRAMUSCULAR; INTRAVENOUS EVERY 6 HOURS PRN
Status: DISCONTINUED | OUTPATIENT
Start: 2023-07-11 | End: 2023-07-14 | Stop reason: HOSPADM

## 2023-07-11 RX ORDER — ONDANSETRON 4 MG/1
4 TABLET, ORALLY DISINTEGRATING ORAL EVERY 8 HOURS PRN
Status: DISCONTINUED | OUTPATIENT
Start: 2023-07-11 | End: 2023-07-14 | Stop reason: HOSPADM

## 2023-07-11 RX ORDER — CHLORHEXIDINE GLUCONATE 0.12 MG/ML
15 RINSE ORAL 2 TIMES DAILY
Status: DISCONTINUED | OUTPATIENT
Start: 2023-07-11 | End: 2023-07-14 | Stop reason: HOSPADM

## 2023-07-11 RX ORDER — IPRATROPIUM BROMIDE AND ALBUTEROL SULFATE 2.5; .5 MG/3ML; MG/3ML
1 SOLUTION RESPIRATORY (INHALATION) EVERY 4 HOURS
Status: DISCONTINUED | OUTPATIENT
Start: 2023-07-11 | End: 2023-07-14 | Stop reason: HOSPADM

## 2023-07-11 RX ADMIN — CHLORHEXIDINE GLUCONATE 0.12% ORAL RINSE 15 ML: 1.2 LIQUID ORAL at 21:41

## 2023-07-11 RX ADMIN — APIXABAN 2.5 MG: 5 TABLET, FILM COATED ORAL at 21:40

## 2023-07-11 RX ADMIN — METOPROLOL TARTRATE 25 MG: 25 TABLET, FILM COATED ORAL at 21:40

## 2023-07-11 RX ADMIN — TRAZODONE HYDROCHLORIDE 50 MG: 50 TABLET ORAL at 21:40

## 2023-07-11 RX ADMIN — Medication 10 ML: at 21:56

## 2023-07-11 RX ADMIN — IPRATROPIUM BROMIDE AND ALBUTEROL SULFATE 1 DOSE: .5; 2.5 SOLUTION RESPIRATORY (INHALATION) at 22:54

## 2023-07-11 RX ADMIN — DEXTROSE MONOHYDRATE 250 ML: 100 INJECTION, SOLUTION INTRAVENOUS at 14:05

## 2023-07-11 RX ADMIN — GABAPENTIN 100 MG: 100 CAPSULE ORAL at 21:52

## 2023-07-11 RX ADMIN — HYDROCORTISONE 2.5%: 25 CREAM TOPICAL at 22:30

## 2023-07-11 RX ADMIN — SODIUM ZIRCONIUM CYCLOSILICATE 10 G: 10 POWDER, FOR SUSPENSION ORAL at 15:14

## 2023-07-11 RX ADMIN — IPRATROPIUM BROMIDE AND ALBUTEROL SULFATE 1 DOSE: .5; 2.5 SOLUTION RESPIRATORY (INHALATION) at 19:28

## 2023-07-11 RX ADMIN — ATORVASTATIN CALCIUM 40 MG: 40 TABLET, FILM COATED ORAL at 21:40

## 2023-07-11 RX ADMIN — SENNOSIDES AND DOCUSATE SODIUM 2 TABLET: 50; 8.6 TABLET ORAL at 21:40

## 2023-07-11 RX ADMIN — INSULIN HUMAN 10 UNITS: 100 INJECTION, SOLUTION PARENTERAL at 14:09

## 2023-07-11 ASSESSMENT — PAIN SCALES - GENERAL: PAINLEVEL_OUTOF10: 0

## 2023-07-11 ASSESSMENT — PULMONARY FUNCTION TESTS
PIF_VALUE: 25
PIF_VALUE: 36
PIF_VALUE: 35

## 2023-07-11 NOTE — PLAN OF CARE
Chronic vent/trach patient   ESRD on HD   Hypotension       Refused 3 dialysis sessions   K 6.1  Cr 8.0  Given hyperK protocol     Nephro and critical care consulted   ICU admit     Repeat BMP in couple hours     Thais Benton  07/11/23  5:39 PM

## 2023-07-11 NOTE — ED NOTES
Bedside report given at this time. RT and admitting RN taking pt to admitting room.       Nany Akers RN  07/11/23 4213

## 2023-07-11 NOTE — ED NOTES
Assisted [place] IR nurse as she attempted iv placement with ultrasound. Unsuccessful and Dr Denver Cabello updated.   Miriam Connelly BSN, RN

## 2023-07-11 NOTE — ED NOTES
3109- Call returned from nephrology and spoke with Dr Sarthak Monson.      Rolanda Price  07/11/23 9093

## 2023-07-11 NOTE — ED PROVIDER NOTES
preliminarily interpreted by the ED Provider with the below findings:   Interpretation per the Radiologist below, if available at the time of this note:     XR CHEST PORTABLE   Final Result   Moderate congestion and/or infiltrates identified in the lungs. Small right   pleural effusion         CT HEAD WO CONTRAST    (Results Pending)      XR CHEST PORTABLE    Result Date: 7/7/2023  EXAMINATION: ONE XRAY VIEW OF THE CHEST 7/7/2023 4:33 pm COMPARISON: Chest x-ray dated 20 March 2023 HISTORY: ORDERING SYSTEM PROVIDED HISTORY: confirm trach placement TECHNOLOGIST PROVIDED HISTORY: Reason for exam:->confirm trach placement Reason for Exam: trach placement FINDINGS: There is a tracheostomy tube in place with the tip in the upper trachea. No pneumothorax. Probable small right pleural effusion. Right basilar airspace opacities. Stable cardiomediastinal silhouette     1. Tracheostomy tube in place with the tip in the upper trachea. 2.  Small right pleural pleural effusion with right lower lobe airspace opacities which could reflect atelectasis versus pneumonia           EKG: The Ekg interpreted by me shows  normal sinus rhythm with a rate of 68  Axis is   Left axis deviation  QTc is  normal  Intervals and Durations are unremarkable. ST Segments: nonspecific changes  No significant change from prior EKG dated 2/17/2023    PROCEDURES   Unless otherwise noted below, none     CRITICAL CARE TIME   The total Critical Care time is 35 minutes which excludes separately billable procedures. Vitals:    Vitals:    07/11/23 1255 07/11/23 1402 07/11/23 1425 07/11/23 1431   BP: (!) 133/108 (!) 145/72  (!) 151/72   Pulse: 66 63 66 64   Resp: 20 20 16 21   Temp:       TempSrc:       SpO2: 100% 99% 97% 98%             Is this patient to be included in the SEP-1 Core Measure due to severe sepsis or septic shock? No   Exclusion criteria - the patient is NOT to be included for SEP-1 Core Measure due to:   Infection is not

## 2023-07-11 NOTE — ED NOTES
Pt returned to room. Pt's IV infiltrated. MD made aware. Attempting new access at this time.       Rolanda Celis, RN  07/11/23 5452

## 2023-07-11 NOTE — ED NOTES
Attempted iv access to pt left arm w/o success. R arm has dialysis shunt. Call placed to IR nurse to attempt access.   Rangel Barajas BSN, RN

## 2023-07-12 LAB
ANION GAP SERPL CALCULATED.3IONS-SCNC: 15 MMOL/L (ref 3–16)
BASE EXCESS BLDV CALC-SCNC: -4.3 MMOL/L (ref -3–3)
BASOPHILS # BLD: 0.1 K/UL (ref 0–0.2)
BASOPHILS NFR BLD: 0.9 %
BUN SERPL-MCNC: 82 MG/DL (ref 7–20)
CALCIUM SERPL-MCNC: 9.2 MG/DL (ref 8.3–10.6)
CHLORIDE SERPL-SCNC: 95 MMOL/L (ref 99–110)
CO2 BLDV-SCNC: 22 MMOL/L
CO2 SERPL-SCNC: 24 MMOL/L (ref 21–32)
COHGB MFR BLDV: 1.2 % (ref 0–1.5)
CREAT SERPL-MCNC: 8.2 MG/DL (ref 0.6–1.2)
DEPRECATED RDW RBC AUTO: 18.4 % (ref 12.4–15.4)
EOSINOPHIL # BLD: 0.3 K/UL (ref 0–0.6)
EOSINOPHIL NFR BLD: 5.1 %
EST. AVERAGE GLUCOSE BLD GHB EST-MCNC: 205.9 MG/DL
GFR SERPLBLD CREATININE-BSD FMLA CKD-EPI: 5 ML/MIN/{1.73_M2}
GLUCOSE BLD-MCNC: 103 MG/DL (ref 70–99)
GLUCOSE BLD-MCNC: 73 MG/DL (ref 70–99)
GLUCOSE BLD-MCNC: 80 MG/DL (ref 70–99)
GLUCOSE BLD-MCNC: 91 MG/DL (ref 70–99)
GLUCOSE BLD-MCNC: 96 MG/DL (ref 70–99)
GLUCOSE SERPL-MCNC: 85 MG/DL (ref 70–99)
HBA1C MFR BLD: 8.8 %
HCO3 BLDV-SCNC: 20.4 MMOL/L (ref 23–29)
HCT VFR BLD AUTO: 33.2 % (ref 36–48)
HGB BLD-MCNC: 10.6 G/DL (ref 12–16)
LYMPHOCYTES # BLD: 1.6 K/UL (ref 1–5.1)
LYMPHOCYTES NFR BLD: 26.9 %
MCH RBC QN AUTO: 29.4 PG (ref 26–34)
MCHC RBC AUTO-ENTMCNC: 31.9 G/DL (ref 31–36)
MCV RBC AUTO: 92.1 FL (ref 80–100)
METHGB MFR BLDV: 0.3 %
MONOCYTES # BLD: 0.4 K/UL (ref 0–1.3)
MONOCYTES NFR BLD: 6.3 %
NEUTROPHILS # BLD: 3.7 K/UL (ref 1.7–7.7)
NEUTROPHILS NFR BLD: 60.8 %
O2 CT VFR BLDV CALC: 16 VOL %
O2 THERAPY: ABNORMAL
PCO2 BLDV: 36.4 MMHG (ref 40–50)
PERFORMED ON: ABNORMAL
PERFORMED ON: NORMAL
PH BLDV: 7.37 [PH] (ref 7.35–7.45)
PLATELET # BLD AUTO: 140 K/UL (ref 135–450)
PMV BLD AUTO: 8.4 FL (ref 5–10.5)
PO2 BLDV: 232.7 MMHG (ref 25–40)
POTASSIUM SERPL-SCNC: 5.6 MMOL/L (ref 3.5–5.1)
RBC # BLD AUTO: 3.61 M/UL (ref 4–5.2)
SAO2 % BLDV: 100 %
SODIUM SERPL-SCNC: 134 MMOL/L (ref 136–145)
WBC # BLD AUTO: 6.1 K/UL (ref 4–11)

## 2023-07-12 PROCEDURE — 80048 BASIC METABOLIC PNL TOTAL CA: CPT

## 2023-07-12 PROCEDURE — 2000000000 HC ICU R&B

## 2023-07-12 PROCEDURE — 90935 HEMODIALYSIS ONE EVALUATION: CPT

## 2023-07-12 PROCEDURE — 94003 VENT MGMT INPAT SUBQ DAY: CPT

## 2023-07-12 PROCEDURE — 5A1D70Z PERFORMANCE OF URINARY FILTRATION, INTERMITTENT, LESS THAN 6 HOURS PER DAY: ICD-10-PCS | Performed by: INTERNAL MEDICINE

## 2023-07-12 PROCEDURE — 82803 BLOOD GASES ANY COMBINATION: CPT

## 2023-07-12 PROCEDURE — 94761 N-INVAS EAR/PLS OXIMETRY MLT: CPT

## 2023-07-12 PROCEDURE — 99291 CRITICAL CARE FIRST HOUR: CPT | Performed by: INTERNAL MEDICINE

## 2023-07-12 PROCEDURE — 2700000000 HC OXYGEN THERAPY PER DAY

## 2023-07-12 PROCEDURE — 85025 COMPLETE CBC W/AUTO DIFF WBC: CPT

## 2023-07-12 PROCEDURE — 6370000000 HC RX 637 (ALT 250 FOR IP)

## 2023-07-12 PROCEDURE — 2580000003 HC RX 258

## 2023-07-12 PROCEDURE — 6370000000 HC RX 637 (ALT 250 FOR IP): Performed by: INTERNAL MEDICINE

## 2023-07-12 PROCEDURE — 94640 AIRWAY INHALATION TREATMENT: CPT

## 2023-07-12 PROCEDURE — 36415 COLL VENOUS BLD VENIPUNCTURE: CPT

## 2023-07-12 PROCEDURE — 2500000003 HC RX 250 WO HCPCS

## 2023-07-12 PROCEDURE — 2500000003 HC RX 250 WO HCPCS: Performed by: INTERNAL MEDICINE

## 2023-07-12 RX ADMIN — MUPIROCIN: 20 OINTMENT TOPICAL at 20:01

## 2023-07-12 RX ADMIN — PANTOPRAZOLE SODIUM 40 MG: 40 TABLET, DELAYED RELEASE ORAL at 08:00

## 2023-07-12 RX ADMIN — IPRATROPIUM BROMIDE AND ALBUTEROL SULFATE 1 DOSE: .5; 2.5 SOLUTION RESPIRATORY (INHALATION) at 19:27

## 2023-07-12 RX ADMIN — SENNOSIDES AND DOCUSATE SODIUM 2 TABLET: 50; 8.6 TABLET ORAL at 20:01

## 2023-07-12 RX ADMIN — SERTRALINE HYDROCHLORIDE 50 MG: 50 TABLET ORAL at 07:59

## 2023-07-12 RX ADMIN — ATORVASTATIN CALCIUM 40 MG: 40 TABLET, FILM COATED ORAL at 20:00

## 2023-07-12 RX ADMIN — GABAPENTIN 100 MG: 100 CAPSULE ORAL at 07:59

## 2023-07-12 RX ADMIN — CALCIUM GLUCONATE 1000 MG: 20 INJECTION, SOLUTION INTRAVENOUS at 02:11

## 2023-07-12 RX ADMIN — GABAPENTIN 100 MG: 100 CAPSULE ORAL at 20:01

## 2023-07-12 RX ADMIN — APIXABAN 2.5 MG: 5 TABLET, FILM COATED ORAL at 08:00

## 2023-07-12 RX ADMIN — IPRATROPIUM BROMIDE AND ALBUTEROL SULFATE 1 DOSE: .5; 2.5 SOLUTION RESPIRATORY (INHALATION) at 07:47

## 2023-07-12 RX ADMIN — TRAZODONE HYDROCHLORIDE 50 MG: 50 TABLET ORAL at 20:01

## 2023-07-12 RX ADMIN — IPRATROPIUM BROMIDE AND ALBUTEROL SULFATE 1 DOSE: .5; 2.5 SOLUTION RESPIRATORY (INHALATION) at 15:31

## 2023-07-12 RX ADMIN — IPRATROPIUM BROMIDE AND ALBUTEROL SULFATE 1 DOSE: .5; 2.5 SOLUTION RESPIRATORY (INHALATION) at 02:37

## 2023-07-12 RX ADMIN — CHLORHEXIDINE GLUCONATE 0.12% ORAL RINSE 15 ML: 1.2 LIQUID ORAL at 08:02

## 2023-07-12 RX ADMIN — IPRATROPIUM BROMIDE AND ALBUTEROL SULFATE 1 DOSE: .5; 2.5 SOLUTION RESPIRATORY (INHALATION) at 22:45

## 2023-07-12 RX ADMIN — HYDROCORTISONE 2.5%: 25 CREAM TOPICAL at 20:25

## 2023-07-12 RX ADMIN — Medication 10 ML: at 20:02

## 2023-07-12 RX ADMIN — METOPROLOL TARTRATE 25 MG: 25 TABLET, FILM COATED ORAL at 20:01

## 2023-07-12 RX ADMIN — APIXABAN 2.5 MG: 5 TABLET, FILM COATED ORAL at 20:01

## 2023-07-12 RX ADMIN — IPRATROPIUM BROMIDE AND ALBUTEROL SULFATE 1 DOSE: .5; 2.5 SOLUTION RESPIRATORY (INHALATION) at 11:13

## 2023-07-12 RX ADMIN — CHLORHEXIDINE GLUCONATE 0.12% ORAL RINSE 15 ML: 1.2 LIQUID ORAL at 20:01

## 2023-07-12 RX ADMIN — METOPROLOL TARTRATE 25 MG: 25 TABLET, FILM COATED ORAL at 08:00

## 2023-07-12 RX ADMIN — CALCIUM ACETATE 2001 MG: 667 CAPSULE ORAL at 07:59

## 2023-07-12 RX ADMIN — Medication 10 ML: at 08:02

## 2023-07-12 ASSESSMENT — PAIN SCALES - GENERAL
PAINLEVEL_OUTOF10: 0
PAINLEVEL_OUTOF10: 0

## 2023-07-12 ASSESSMENT — PULMONARY FUNCTION TESTS
PIF_VALUE: 36
PIF_VALUE: 41
PIF_VALUE: 31
PIF_VALUE: 35
PIF_VALUE: 35
PIF_VALUE: 25

## 2023-07-12 NOTE — ACP (ADVANCE CARE PLANNING)
Advance Care Planning     General Advance Care Planning (ACP) Conversation    Date of Conversation: 7/11/2023  Conducted with: Patient with Decision Making Capacity    Healthcare Decision Maker:    Primary Decision Maker: Ayleen Seay - Child - [phone]  Click here to complete [address] including selection of the Healthcare Decision Maker Relationship (ie \"Primary\"). Today we documented Decision Maker(s). The patient will provide ACP documents.     Content/Action Overview:  Pt dtr to bring in  Reviewed DNR/DNI and patient elects Full Code (Attempt Resuscitation)        Length of Voluntary ACP Conversation in minutes:  <16 minutes (Non-Billable)    Ivanna Weathers RN

## 2023-07-12 NOTE — CONSULTS
Thank you to requesting provider:  NIKKO Silva  , for asking us to see Kvng Berger  Reason for consultation:  ESRD  Chief Complaint:  Facial twitching     History of Presenting Illness      67 y/o with history of ESRD and chronic respiratory failure s/p trach now at [address] [place] [address] Rd home. She has refused her last 3 dialysis treatments but now appears too confused to have a conversation about withdrawal of dialysis. Potassium is elevated and she has facial twitching with intermittent responses to questions. Arm chronically swollen.         Past Medical/Surgical History      Active Ambulatory Problems     Diagnosis Date Noted    Unresponsive episode     Syncope     Acute respiratory failure with hypoxia and hypercapnia (HCC)     Pneumonia due to infectious organism     ESRD (end stage renal disease) on dialysis [facility])     Chronic respiratory failure requiring continuous mechanical ventilation through tracheostomy ([address])     Acute on chronic respiratory failure with hypoxia (HCC)     Acute pulmonary edema (HCC)     Rectal pain     History of CVA (cerebrovascular accident)     Hospital-acquired pneumonia     Anemia, chronic disease 02/08/2021    Anxiety 64/09/9495    Diastolic heart failure ([address]) 10/23/2018    Dysphagia, pharyngeal phase 12/26/2020    Primary hypertension 08/03/2017    GERD (gastroesophageal reflux disease) 10/10/2021    Morbid obesity with BMI of 50.0-59.9, adult ([address]) 10/10/2021    SILVER (obstructive sleep apnea) 01/31/2021    Abnormal chest x-ray     Vaginal bleeding 04/26/2022    SBO (small bowel obstruction) ([address]) 05/06/2022    Fecal impaction ([address])     Chest pain 09/15/2022    Coronary artery disease involving native heart with unstable angina pectoris ([address]) 09/19/2022    Mixed hyperlipidemia 09/19/2022    ESRD on hemodialysis ([address]) 09/19/2022    Tracheostomy dependence ([address]) 10/27/2022    Leukocytosis 01/20/2023    Macrocytic anemia 01/20/2023    Acute encephalopathy 01/20/2023
input(s): APTT in the last 72 hours. MV Settings:  Vent Mode: AC/VC Resp Rate (Set): 20 bmp/Vt (Set, mL): 400 mL/ /FiO2 : 40 %    IV:   sodium chloride      dextrose             Medications:  Scheduled Meds:   apixaban  2.5 mg Oral BID    atorvastatin  40 mg Oral Nightly    calcium acetate  3 capsule Oral TID     chlorhexidine  15 mL Mouth/Throat BID    hydrocortisone   Rectal BID    ipratropium 0.5 mg-albuterol 2.5 mg  1 Dose Inhalation Q4H    metoprolol tartrate  25 mg Oral BID    [START ON 7/12/2023] midodrine  10 mg Oral Once per day on Mon Wed Fri    [START ON 7/12/2023] pantoprazole  40 mg Oral QAM AC    sennosides-docusate sodium  2 tablet Oral Nightly    sertraline  50 mg Oral Daily    traZODone  50 mg Oral Nightly    sodium chloride flush  5-40 mL IntraVENous 2 times per day    insulin lispro  0-4 Units SubCUTAneous TID WC    insulin lispro  0-4 Units SubCUTAneous Nightly    gabapentin  100 mg Oral TID       PRN Meds:  bisacodyl, carboxymethylcellulose PF, loperamide, polyethylene glycol, sodium chloride flush, sodium chloride, ondansetron **OR** ondansetron, acetaminophen **OR** acetaminophen, glucose, dextrose bolus **OR** dextrose bolus, glucagon (rDNA), dextrose    Results:  CBC:   Recent Labs     07/11/23 1317   WBC 7.0   HGB 11.8*   HCT 38.0   MCV 94.0        BMP:   Recent Labs     07/11/23 1317 07/11/23 2113   * 132*   K 6.1* 6.1*   CL 95* 94*   CO2 26 25   BUN 83* 80*   CREATININE 8.0* 8.3*     LIVER PROFILE:   Recent Labs     07/11/23 1317 07/11/23 2113   AST 8* 8*   ALT 9* 8*   BILITOT 0.5 0.6   ALKPHOS 122 110     PT/INR:   No results for input(s): PROTIME, INR in the last 72 hours. APTT: No results for input(s): APTT in the last 72 hours.   UA:No results for input(s): NITRITE, COLORU, PHUR, LABCAST, WBCUA, RBCUA, MUCUS, TRICHOMONAS, YEAST, BACTERIA, CLARITYU, SPECGRAV, LEUKOCYTESUR, UROBILINOGEN, BILIRUBINUR, BLOODU, GLUCOSEU, AMORPHOUS in the last 72

## 2023-07-13 LAB
ANION GAP SERPL CALCULATED.3IONS-SCNC: 17 MMOL/L (ref 3–16)
BASOPHILS # BLD: 0.1 K/UL (ref 0–0.2)
BASOPHILS NFR BLD: 0.8 %
BUN SERPL-MCNC: 58 MG/DL (ref 7–20)
CALCIUM SERPL-MCNC: 8.9 MG/DL (ref 8.3–10.6)
CHLORIDE SERPL-SCNC: 96 MMOL/L (ref 99–110)
CO2 SERPL-SCNC: 18 MMOL/L (ref 21–32)
CREAT SERPL-MCNC: 6.8 MG/DL (ref 0.6–1.2)
DEPRECATED RDW RBC AUTO: 18.4 % (ref 12.4–15.4)
EOSINOPHIL # BLD: 0.3 K/UL (ref 0–0.6)
EOSINOPHIL NFR BLD: 4.7 %
GFR SERPLBLD CREATININE-BSD FMLA CKD-EPI: 6 ML/MIN/{1.73_M2}
GLUCOSE BLD-MCNC: 76 MG/DL (ref 70–99)
GLUCOSE BLD-MCNC: 79 MG/DL (ref 70–99)
GLUCOSE BLD-MCNC: 81 MG/DL (ref 70–99)
GLUCOSE BLD-MCNC: 89 MG/DL (ref 70–99)
GLUCOSE BLD-MCNC: 89 MG/DL (ref 70–99)
GLUCOSE SERPL-MCNC: 76 MG/DL (ref 70–99)
HCT VFR BLD AUTO: 34.4 % (ref 36–48)
HGB BLD-MCNC: 10.8 G/DL (ref 12–16)
LYMPHOCYTES # BLD: 1.5 K/UL (ref 1–5.1)
LYMPHOCYTES NFR BLD: 21.4 %
MCH RBC QN AUTO: 29.2 PG (ref 26–34)
MCHC RBC AUTO-ENTMCNC: 31.2 G/DL (ref 31–36)
MCV RBC AUTO: 93.3 FL (ref 80–100)
MONOCYTES # BLD: 0.6 K/UL (ref 0–1.3)
MONOCYTES NFR BLD: 8.9 %
NEUTROPHILS # BLD: 4.4 K/UL (ref 1.7–7.7)
NEUTROPHILS NFR BLD: 64.2 %
PERFORMED ON: NORMAL
PLATELET # BLD AUTO: 129 K/UL (ref 135–450)
PMV BLD AUTO: 8.2 FL (ref 5–10.5)
POTASSIUM SERPL-SCNC: 5.6 MMOL/L (ref 3.5–5.1)
RBC # BLD AUTO: 3.69 M/UL (ref 4–5.2)
SODIUM SERPL-SCNC: 131 MMOL/L (ref 136–145)
WBC # BLD AUTO: 6.9 K/UL (ref 4–11)

## 2023-07-13 PROCEDURE — 94761 N-INVAS EAR/PLS OXIMETRY MLT: CPT

## 2023-07-13 PROCEDURE — 2580000003 HC RX 258

## 2023-07-13 PROCEDURE — 90935 HEMODIALYSIS ONE EVALUATION: CPT

## 2023-07-13 PROCEDURE — 92610 EVALUATE SWALLOWING FUNCTION: CPT

## 2023-07-13 PROCEDURE — 89220 SPUTUM SPECIMEN COLLECTION: CPT

## 2023-07-13 PROCEDURE — 87070 CULTURE OTHR SPECIMN AEROBIC: CPT

## 2023-07-13 PROCEDURE — 2700000000 HC OXYGEN THERAPY PER DAY

## 2023-07-13 PROCEDURE — 87205 SMEAR GRAM STAIN: CPT

## 2023-07-13 PROCEDURE — 2500000003 HC RX 250 WO HCPCS

## 2023-07-13 PROCEDURE — 94640 AIRWAY INHALATION TREATMENT: CPT

## 2023-07-13 PROCEDURE — 6370000000 HC RX 637 (ALT 250 FOR IP): Performed by: INTERNAL MEDICINE

## 2023-07-13 PROCEDURE — 80048 BASIC METABOLIC PNL TOTAL CA: CPT

## 2023-07-13 PROCEDURE — 94003 VENT MGMT INPAT SUBQ DAY: CPT

## 2023-07-13 PROCEDURE — 6370000000 HC RX 637 (ALT 250 FOR IP)

## 2023-07-13 PROCEDURE — 2000000000 HC ICU R&B

## 2023-07-13 PROCEDURE — 99233 SBSQ HOSP IP/OBS HIGH 50: CPT | Performed by: INTERNAL MEDICINE

## 2023-07-13 PROCEDURE — 92526 ORAL FUNCTION THERAPY: CPT

## 2023-07-13 PROCEDURE — 85025 COMPLETE CBC W/AUTO DIFF WBC: CPT

## 2023-07-13 RX ADMIN — CALCIUM ACETATE 2001 MG: 667 CAPSULE ORAL at 20:26

## 2023-07-13 RX ADMIN — IPRATROPIUM BROMIDE AND ALBUTEROL SULFATE 1 DOSE: .5; 2.5 SOLUTION RESPIRATORY (INHALATION) at 11:51

## 2023-07-13 RX ADMIN — TRAZODONE HYDROCHLORIDE 50 MG: 50 TABLET ORAL at 19:56

## 2023-07-13 RX ADMIN — CALCIUM ACETATE 2001 MG: 667 CAPSULE ORAL at 08:49

## 2023-07-13 RX ADMIN — SERTRALINE HYDROCHLORIDE 50 MG: 50 TABLET ORAL at 08:49

## 2023-07-13 RX ADMIN — METOPROLOL TARTRATE 25 MG: 25 TABLET, FILM COATED ORAL at 08:49

## 2023-07-13 RX ADMIN — IPRATROPIUM BROMIDE AND ALBUTEROL SULFATE 1 DOSE: .5; 2.5 SOLUTION RESPIRATORY (INHALATION) at 07:16

## 2023-07-13 RX ADMIN — METOPROLOL TARTRATE 25 MG: 25 TABLET, FILM COATED ORAL at 19:56

## 2023-07-13 RX ADMIN — APIXABAN 2.5 MG: 5 TABLET, FILM COATED ORAL at 19:56

## 2023-07-13 RX ADMIN — CHLORHEXIDINE GLUCONATE 0.12% ORAL RINSE 15 ML: 1.2 LIQUID ORAL at 09:03

## 2023-07-13 RX ADMIN — ATORVASTATIN CALCIUM 40 MG: 40 TABLET, FILM COATED ORAL at 19:56

## 2023-07-13 RX ADMIN — GABAPENTIN 100 MG: 100 CAPSULE ORAL at 08:49

## 2023-07-13 RX ADMIN — CHLORHEXIDINE GLUCONATE 0.12% ORAL RINSE 15 ML: 1.2 LIQUID ORAL at 19:56

## 2023-07-13 RX ADMIN — PANTOPRAZOLE SODIUM 40 MG: 40 TABLET, DELAYED RELEASE ORAL at 06:13

## 2023-07-13 RX ADMIN — GABAPENTIN 100 MG: 100 CAPSULE ORAL at 19:56

## 2023-07-13 RX ADMIN — APIXABAN 2.5 MG: 5 TABLET, FILM COATED ORAL at 08:49

## 2023-07-13 RX ADMIN — IPRATROPIUM BROMIDE AND ALBUTEROL SULFATE 1 DOSE: .5; 2.5 SOLUTION RESPIRATORY (INHALATION) at 19:50

## 2023-07-13 RX ADMIN — Medication 10 ML: at 20:22

## 2023-07-13 RX ADMIN — MUPIROCIN: 20 OINTMENT TOPICAL at 09:04

## 2023-07-13 RX ADMIN — HYDROCORTISONE 2.5%: 25 CREAM TOPICAL at 19:59

## 2023-07-13 RX ADMIN — IPRATROPIUM BROMIDE AND ALBUTEROL SULFATE 1 DOSE: .5; 2.5 SOLUTION RESPIRATORY (INHALATION) at 22:34

## 2023-07-13 RX ADMIN — IPRATROPIUM BROMIDE AND ALBUTEROL SULFATE 1 DOSE: .5; 2.5 SOLUTION RESPIRATORY (INHALATION) at 03:04

## 2023-07-13 RX ADMIN — IPRATROPIUM BROMIDE AND ALBUTEROL SULFATE 1 DOSE: .5; 2.5 SOLUTION RESPIRATORY (INHALATION) at 15:30

## 2023-07-13 RX ADMIN — SENNOSIDES AND DOCUSATE SODIUM 2 TABLET: 50; 8.6 TABLET ORAL at 19:56

## 2023-07-13 RX ADMIN — MUPIROCIN: 20 OINTMENT TOPICAL at 20:21

## 2023-07-13 ASSESSMENT — PULMONARY FUNCTION TESTS
PIF_VALUE: 38
PIF_VALUE: 32
PIF_VALUE: 34
PIF_VALUE: 39
PIF_VALUE: 33
PIF_VALUE: 27

## 2023-07-13 ASSESSMENT — PAIN SCALES - GENERAL
PAINLEVEL_OUTOF10: 0
PAINLEVEL_OUTOF10: 0

## 2023-07-13 NOTE — PLAN OF CARE
Problem: Safety - Adult  Goal: Free from fall injury  Outcome: Progressing     Problem: Skin/Tissue Integrity  Goal: Absence of new skin breakdown  Description: 1. Monitor for areas of redness and/or skin breakdown  2. Assess vascular access sites hourly  3. Every 4-6 hours minimum:  Change oxygen saturation probe site  4. Every 4-6 hours:  If on nasal continuous positive airway pressure, respiratory therapy assess nares and determine need for appliance change or resting period.   Outcome: Progressing ambulatory

## 2023-07-13 NOTE — PLAN OF CARE
HEART FAILURE CARE PLAN:    Comorbidities Reviewed: Yes   Patient has a past medical history of Abnormal uterine and vaginal bleeding, unspecified, Acute and chronic respiratory failure (acute-on-chronic) ([facility]), Acute blood loss anemia, Acute deep vein thrombosis (DVT) of brachial vein of left upper extremity ([address]), Anxiety disorder, Atherosclerotic heart disease of native coronary artery without angina pectoris, Bleeding hemorrhoids, Cerebellar infarct ([address]), Cerebral infarction ([address]), Chronic diastolic (congestive) heart failure ([address]), Chronic pain syndrome, Constipation, Dependence on renal dialysis ([address]), Dependence on respirator ([address]), Dysphasia, End stage renal disease ([address]), Essential (primary) hypertension, Gastro-esophageal reflux disease with esophagitis, without bleeding, Insomnia, Major depressive disorder, recurrent ([address]), Morbid obesity due to excess calories ([address]), Muscle weakness, Obstructive sleep apnea (adult) (pediatric), Other hyperlipidemia, Other voice and resonance disorders, Personal history of COVID-19, Pulmonary hypertension ([address]), Schizo affective schizophrenia ([address]), Tracheostomy status ([address]), Type 2 diabetes mellitus without complications ([address]), Unspecified atrial fibrillation ([address]), and Unspecified hemorrhoids. ECHOCARDIOGRAM Reviewed: Yes   Patient's Ejection Fraction (EF) is greater than 40%    Weights Reviewed:  Yes   Admission weight: (!) 358 lb 9.6 oz (162.7 kg)   Wt Readings from Last 3 Encounters:   07/13/23 (!) 366 lb 1.6 oz (166.1 kg)   03/20/23 (!) 332 lb 6.4 oz (150.8 kg)   03/17/23 (!) 315 lb 12.8 oz (143.2 kg)     Intake & Output Reviewed: Yes     Intake/Output Summary (Last 24 hours) at 7/13/2023 9186  Last data filed at 7/13/2023 1243  Gross per 24 hour   Intake 60 ml   Output --   Net 60 ml     Medications Reviewed: Yes   SCHEDULED HOSPITAL MEDICATIONS:   mupirocin   Each Nostril BID    apixaban  2.5 mg Oral BID    atorvastatin  40 mg Oral Nightly    calcium acetate

## 2023-07-13 NOTE — DIALYSIS
Treatment time: 3 hours  Net UF: 1900 ml     Pre weight: 166 kg  Post weight:164 kg    Access used: R AVF    Access function: well with  ml/min     Medications or blood products given: None      Regular outpatient schedule: TTS     Summary of response to treatment: Patient tolerated treatment fairly well  with BP ranging from mildly hypotensive to hypertensive. Patient remained stable throughout entire treatment and upon the dialysis RN exiting the ICU. Report given to  Carlos Ramon RN and copy of dialysis treatment record placed in chart, to be scanned into EMR.

## 2023-07-13 NOTE — PLAN OF CARE
Problem: SLP Adult - Impaired Swallowing  Goal: By Discharge: Advance to least restrictive diet without signs or symptoms of aspiration for planned discharge setting. See evaluation for individualized goals. Note: SLP completed evaluation. Please refer to notes in EMR.      Jung Borrero   SLP  Clinician

## 2023-07-14 VITALS
OXYGEN SATURATION: 99 % | SYSTOLIC BLOOD PRESSURE: 158 MMHG | HEART RATE: 79 BPM | TEMPERATURE: 97.7 F | DIASTOLIC BLOOD PRESSURE: 69 MMHG | BODY MASS INDEX: 69.35 KG/M2 | RESPIRATION RATE: 10 BRPM | WEIGHT: 293 LBS

## 2023-07-14 LAB
ANION GAP SERPL CALCULATED.3IONS-SCNC: 15 MMOL/L (ref 3–16)
BASOPHILS # BLD: 0 K/UL (ref 0–0.2)
BASOPHILS NFR BLD: 0.6 %
BUN SERPL-MCNC: 42 MG/DL (ref 7–20)
CALCIUM SERPL-MCNC: 9.2 MG/DL (ref 8.3–10.6)
CHLORIDE SERPL-SCNC: 94 MMOL/L (ref 99–110)
CO2 SERPL-SCNC: 23 MMOL/L (ref 21–32)
CREAT SERPL-MCNC: 5.4 MG/DL (ref 0.6–1.2)
DEPRECATED RDW RBC AUTO: 18 % (ref 12.4–15.4)
EOSINOPHIL # BLD: 0.3 K/UL (ref 0–0.6)
EOSINOPHIL NFR BLD: 4.6 %
GFR SERPLBLD CREATININE-BSD FMLA CKD-EPI: 8 ML/MIN/{1.73_M2}
GLUCOSE BLD-MCNC: 60 MG/DL (ref 70–99)
GLUCOSE BLD-MCNC: 79 MG/DL (ref 70–99)
GLUCOSE BLD-MCNC: 84 MG/DL (ref 70–99)
GLUCOSE BLD-MCNC: 84 MG/DL (ref 70–99)
GLUCOSE SERPL-MCNC: 91 MG/DL (ref 70–99)
HCT VFR BLD AUTO: 32.2 % (ref 36–48)
HGB BLD-MCNC: 10.3 G/DL (ref 12–16)
LYMPHOCYTES # BLD: 1 K/UL (ref 1–5.1)
LYMPHOCYTES NFR BLD: 16 %
MCH RBC QN AUTO: 29.4 PG (ref 26–34)
MCHC RBC AUTO-ENTMCNC: 31.9 G/DL (ref 31–36)
MCV RBC AUTO: 92.1 FL (ref 80–100)
MONOCYTES # BLD: 0.5 K/UL (ref 0–1.3)
MONOCYTES NFR BLD: 8.1 %
NEUTROPHILS # BLD: 4.4 K/UL (ref 1.7–7.7)
NEUTROPHILS NFR BLD: 70.7 %
PERFORMED ON: ABNORMAL
PERFORMED ON: NORMAL
PLATELET # BLD AUTO: 129 K/UL (ref 135–450)
PMV BLD AUTO: 8.1 FL (ref 5–10.5)
POTASSIUM SERPL-SCNC: 4.7 MMOL/L (ref 3.5–5.1)
RBC # BLD AUTO: 3.5 M/UL (ref 4–5.2)
SODIUM SERPL-SCNC: 132 MMOL/L (ref 136–145)
WBC # BLD AUTO: 6.2 K/UL (ref 4–11)

## 2023-07-14 PROCEDURE — 99233 SBSQ HOSP IP/OBS HIGH 50: CPT | Performed by: INTERNAL MEDICINE

## 2023-07-14 PROCEDURE — 6370000000 HC RX 637 (ALT 250 FOR IP): Performed by: INTERNAL MEDICINE

## 2023-07-14 PROCEDURE — 94003 VENT MGMT INPAT SUBQ DAY: CPT

## 2023-07-14 PROCEDURE — 6370000000 HC RX 637 (ALT 250 FOR IP)

## 2023-07-14 PROCEDURE — 94761 N-INVAS EAR/PLS OXIMETRY MLT: CPT

## 2023-07-14 PROCEDURE — 36415 COLL VENOUS BLD VENIPUNCTURE: CPT

## 2023-07-14 PROCEDURE — 80048 BASIC METABOLIC PNL TOTAL CA: CPT

## 2023-07-14 PROCEDURE — 94640 AIRWAY INHALATION TREATMENT: CPT

## 2023-07-14 PROCEDURE — 85025 COMPLETE CBC W/AUTO DIFF WBC: CPT

## 2023-07-14 PROCEDURE — 2500000003 HC RX 250 WO HCPCS

## 2023-07-14 PROCEDURE — 2700000000 HC OXYGEN THERAPY PER DAY

## 2023-07-14 RX ORDER — LORAZEPAM 0.5 MG/1
0.5 TABLET ORAL EVERY 8 HOURS PRN
Qty: 3 TABLET | Refills: 0 | Status: SHIPPED | OUTPATIENT
Start: 2023-07-14 | End: 2023-07-16

## 2023-07-14 RX ADMIN — HYDROCORTISONE 2.5%: 25 CREAM TOPICAL at 12:09

## 2023-07-14 RX ADMIN — IPRATROPIUM BROMIDE AND ALBUTEROL SULFATE 1 DOSE: .5; 2.5 SOLUTION RESPIRATORY (INHALATION) at 03:16

## 2023-07-14 RX ADMIN — IPRATROPIUM BROMIDE AND ALBUTEROL SULFATE 1 DOSE: .5; 2.5 SOLUTION RESPIRATORY (INHALATION) at 15:50

## 2023-07-14 RX ADMIN — CALCIUM ACETATE 2001 MG: 667 CAPSULE ORAL at 12:09

## 2023-07-14 RX ADMIN — GABAPENTIN 100 MG: 100 CAPSULE ORAL at 14:29

## 2023-07-14 RX ADMIN — CHLORHEXIDINE GLUCONATE 0.12% ORAL RINSE 15 ML: 1.2 LIQUID ORAL at 08:43

## 2023-07-14 RX ADMIN — PANTOPRAZOLE SODIUM 40 MG: 40 TABLET, DELAYED RELEASE ORAL at 05:50

## 2023-07-14 RX ADMIN — MUPIROCIN: 20 OINTMENT TOPICAL at 12:08

## 2023-07-14 RX ADMIN — IPRATROPIUM BROMIDE AND ALBUTEROL SULFATE 1 DOSE: .5; 2.5 SOLUTION RESPIRATORY (INHALATION) at 08:04

## 2023-07-14 RX ADMIN — IPRATROPIUM BROMIDE AND ALBUTEROL SULFATE 1 DOSE: .5; 2.5 SOLUTION RESPIRATORY (INHALATION) at 11:28

## 2023-07-14 RX ADMIN — CALCIUM ACETATE 2001 MG: 667 CAPSULE ORAL at 08:42

## 2023-07-14 RX ADMIN — SERTRALINE HYDROCHLORIDE 50 MG: 50 TABLET ORAL at 08:43

## 2023-07-14 RX ADMIN — APIXABAN 2.5 MG: 5 TABLET, FILM COATED ORAL at 08:42

## 2023-07-14 RX ADMIN — METOPROLOL TARTRATE 25 MG: 25 TABLET, FILM COATED ORAL at 08:42

## 2023-07-14 RX ADMIN — GABAPENTIN 100 MG: 100 CAPSULE ORAL at 08:43

## 2023-07-14 ASSESSMENT — PULMONARY FUNCTION TESTS
PIF_VALUE: 29
PIF_VALUE: 32
PIF_VALUE: 33
PIF_VALUE: 36

## 2023-07-14 NOTE — CARE COORDINATION
CM delivered 2nd IMM and provided verbal explanation at bedside. Pt voiced understanding of discharge MCR rights and is agreeable to discharge within 4 hours of delivery of the IMM notice.        07/14/23 4466   IMM Letter   IMM Letter given to Patient/Family/Significant other/Guardian/POA/by: Bernardino Rushing RN  (see attached)   IMM Letter date given: 07/14/23   IMM Letter time given: 1500
DISCHARGE ORDER  Date/Time 2023 12:41 PM  Completed by: Aggie Crisostomo RN, Case Management    Patient Name: Alonzo Anand    : 1950      Admit order Date and Status: IP 2023  Noted discharge order. (verify MD's last order for status of admission/Traditional Medicare 3 [place] Inpatient qualifying stay required for SNF)    Confirmed discharge plan with: Pt                                 Discharge to Facility:     Name: [facility])  Address: [address] [place] [address] Miriam Costello, [place], [place], [facility] [address]  Fax: [phone]    Facility phone number for staff giving report: see above   Pre-certification completed: [address] [facility] [address] [facility] [address] Notification (HENS) completed: NA   Discharge orders and Continuity of Care faxed to facility:    [facility]      Transportation:               Medical Transport explained with choice list offered to pt/family. Choice: (no preference)  Agency used: [id number] up time: 15:00 PM      Pt/family/Nursing/Facility aware of  time:  Yes, CM spoke with daughter Rene Yoo RN  Good RN- ICU charge nurse  Jonnie Gabriel formerly [facility] admissions  Ambulance form completed: YES     Date Last IMM Given:2023    Comments:    Pt is being d/c'd to [facility] today. Pt's O2 sats are 100% on vent/trach. Discharge timeout done with [facility] RN. All discharge needs and concerns addressed. Discharging nurse to complete AMADEO, reconcile AVS, and place final copy with patient's discharge packet. Discharging RN to ensure that written prescriptions for  Level II medications are sent with patient to the facility as per protocol.
family members/significant others, and if so, who? Yes (dtrGet, is primary decision maker)  Plans to Return to Present Housing: Yes  Other Identified Issues/Barriers to RETURNING to current housing: n/a  Potential Assistance needed at discharge: [address] DME:  per facility  Patient expects to discharge to: Long-term care  Plan for transportation at discharge:  via squad to [address] @ [facility]. Financial    Payor: Phil Castellanos / Plan: Bryson MEDRANO DUAL / Product Type: *No Product type* /     Does insurance require precert for SNF: No    Potential assistance Purchasing Medications: No  Meds-to-Beds request:  no      [address] [facility], [address]  Phone: [phone] Fax: [phone]      Notes:    Factors facilitating achievement of predicted outcomes: LTC bed at [facility]    Barriers to discharge: Medical complications    Additional Case Management Notes: CM reviewed chart and met with pt dtr, Get Moseley, via telephone. Get Moseley states that pt is LTC at [facility] and will return there at discharge. ADELITA spoke with [facility]  with [facility] who states that pt can return at discharge and will not need a precert. Pt is chronic vent/trach and is active with Son Remy for HD on T,TH, Sat. CM will cont to follow. The Plan for Transition of Care is related to the following treatment goals of Hyperkalemia [E87.5]  ESRD on dialysis ([address]) [N18.6, I86.0]    IF APPLICABLE: The Patient and/or patient representative Indira Peters and her family were provided with a choice of provider and agrees with the discharge plan. Freedom of choice list with basic dialogue that supports the patient's individualized plan of care/goals and shares the quality data associated with the providers was provided to:     Patient Representative Name:       The Patient and/or Patient Representative Agree with the Discharge Plan?

## 2023-07-14 NOTE — PLAN OF CARE
Problem: Safety - Adult  Goal: Free from fall injury  Outcome: Progressing     Problem: Skin/Tissue Integrity  Goal: Absence of new skin breakdown  Description: 1. Monitor for areas of redness and/or skin breakdown  2. Assess vascular access sites hourly  3. Every 4-6 hours minimum:  Change oxygen saturation probe site  4. Every 4-6 hours:  If on nasal continuous positive airway pressure, respiratory therapy assess nares and determine need for appliance change or resting period.   7/13/2023 2215 by Maria Guadalupe Lowery RN  Outcome: Progressing  7/13/2023 1858 by Wyatt Richardson RN  Outcome: Progressing     Problem: Respiratory - Adult  Goal: Achieves optimal ventilation and oxygenation  7/13/2023 2215 by Maria Guadalupe Lowery RN  Outcome: Progressing  7/13/2023 1858 by Wyatt Richardson RN  Outcome: Progressing

## 2023-07-15 LAB
BACTERIA SPEC RESP CULT: NORMAL
GRAM STN SPEC: NORMAL

## 2023-07-17 NOTE — DISCHARGE SUMMARY
extremities. No rash on exposed extremities. M/S: right arm graft, right arm edema +  DP 1 +  Neuro: Awake. Nonfocal  Psych: Oriented x 2. No anxiety or agitation.           Disposition: [facility]    Patient Instructions:      Medication List        CONTINUE taking these medications      acetaminophen 325 MG tablet  Commonly known as: TYLENOL     apixaban 2.5 MG Tabs tablet  Commonly known as: ELIQUIS     atorvastatin 40 MG tablet  Commonly known as: LIPITOR  Take 1 tablet by mouth nightly     BISACODYL RE     calcium acetate 667 MG Tabs     carboxymethylcellulose PF 1 % Gel ophthalmic gel  Commonly known as: THERATEARS  Place 1 drop into both eyes every 4 hours as needed for Dry Eyes     chlorhexidine 0.12 % solution  Commonly known as: PERIDEX     diphenhydrAMINE 25 MG capsule  Commonly known as: BENADRYL     gabapentin 300 MG capsule  Commonly known as: NEURONTIN     guaiFENesin 600 MG extended release tablet  Commonly known as: MUCINEX     hydrocortisone 1 % cream     hydrocortisone 2.5 % Crea rectal cream  Commonly known as: ANUSOL-HC  Place rectally 2 times daily     ipratropium 0.5 mg-albuterol 2.5 mg 0.5-2.5 (3) MG/3ML Soln nebulizer solution  Commonly known as: DUONEB     loperamide 2 MG capsule  Commonly known as: IMODIUM     metoprolol tartrate 25 MG tablet  Commonly known as: LOPRESSOR  Take 1 tablet by mouth 2 times daily Hold for SBP less than 110     * midodrine 5 MG tablet  Commonly known as: PROAMATINE     * midodrine 5 MG tablet  Commonly known as: PROAMATINE     omeprazole 20 MG delayed release capsule  Commonly known as: PRILOSEC     polyethylene glycol 17 g packet  Commonly known as: GLYCOLAX     promethazine 12.5 MG tablet  Commonly known as: PHENERGAN     sennosides-docusate sodium 8.6-50 MG tablet  Commonly known as: SENOKOT-S     sertraline 50 MG tablet  Commonly known as: ZOLOFT     traZODone 50 MG tablet  Commonly known as: DESYREL     UNABLE TO FIND     vitamin B-12 500 MCG tablet  Commonly

## 2023-08-14 NOTE — PROGRESS NOTES
Continued Stay Note  [facility]     Patient Name: Joy Bueno  MRN: [id number]  Today's Date: 8/22/2019    Admit Date: 8/22/2019    Discharge Plan     Row Name 08/22/19 2585       Plan    Plan Comments  Patient is from [place] and transported here by [facility] with her caregiver, Laura. Spoke with [place] to confirm this information. ROGELIO Manuel, consulted to call [facility] to confirm arrangements for return to [place] today.     Row Name 08/22/19 3533       Plan    Plan  Social work called [employer] at [facility] and they have verified that the patient came to the hospital with [facility].        Discharge Codes    No documentation.       Expected Discharge Date and Time     Expected Discharge Date Expected Discharge Time    Aug 22, 2019             Sandy Kuhn RN     normal

## 2024-02-02 ENCOUNTER — INPATIENT HOSPITAL (OUTPATIENT)
Dept: URBAN - METROPOLITAN AREA HOSPITAL 138 | Facility: HOSPITAL | Age: 74
End: 2024-02-02
Payer: MEDICAID

## 2024-02-02 DIAGNOSIS — D64.9 ANEMIA, UNSPECIFIED: ICD-10-CM

## 2024-02-02 DIAGNOSIS — K92.1 MELENA: ICD-10-CM

## 2024-02-02 PROCEDURE — 99222 1ST HOSP IP/OBS MODERATE 55: CPT | Mod: GC | Performed by: INTERNAL MEDICINE

## 2024-02-03 ENCOUNTER — INPATIENT HOSPITAL (OUTPATIENT)
Dept: URBAN - METROPOLITAN AREA HOSPITAL 138 | Facility: HOSPITAL | Age: 74
End: 2024-02-03
Payer: MEDICAID

## 2024-02-03 DIAGNOSIS — R19.7 DIARRHEA, UNSPECIFIED: ICD-10-CM

## 2024-02-03 PROCEDURE — 99232 SBSQ HOSP IP/OBS MODERATE 35: CPT | Performed by: INTERNAL MEDICINE

## 2024-06-06 NOTE — PROGRESS NOTES
[facility] notified of procedure. Two [facility] techs currently en route, Security//ER aware of patient arriving via [employer]. Culture is unsuppressed

## (undated) DEVICE — SUTURE PERMAHAND SZ 3-0 L18IN NONABSORBABLE BLK L26MM SH C013D

## (undated) DEVICE — ULTRACLEAN ACCESSORY ELECTRODE, 6 INCH COATED BLADE WITH EXTENDED INSULATION: Brand: ULTRACLEAN

## (undated) DEVICE — BASIC SINGLE BASIN 1-LF: Brand: MEDLINE INDUSTRIES, INC.

## (undated) DEVICE — SUTURE PERMAHAND SZ 3-0 L30IN NONABSORBABLE BLK SILK BRAID A304H

## (undated) DEVICE — TOWEL,OR,DSP,ST,BLUE,DLX,10/PK,8PK/CS: Brand: MEDLINE

## (undated) DEVICE — DRAPE THER FLUID WARMING 66X44 IN FLAT SLUSH DBL DISC ORS

## (undated) DEVICE — ANTIBACTERIAL UNDYED BRAIDED (POLYGLACTIN 910), SYNTHETIC ABSORBABLE SUTURE: Brand: COATED VICRYL

## (undated) DEVICE — SUT ETHLN 2/0 PS 18IN 585H

## (undated) DEVICE — SUT SILK 4/0 TIES 18IN A183H

## (undated) DEVICE — INTRAOPERATIVE COVER KIT, 10 PACK: Brand: SITE-RITE

## (undated) DEVICE — PENCIL SMK EVAC TELSCP 3 M TBNG

## (undated) DEVICE — KT ORCA ORCAPOD DISP STRL

## (undated) DEVICE — COVER,LIGHT HANDLE,FLX,1/PK: Brand: MEDLINE INDUSTRIES, INC.

## (undated) DEVICE — INTRO ACCSR BLNT TP

## (undated) DEVICE — SUT PROLN 7/0 BV1 D/A 24IN 8702H

## (undated) DEVICE — SUT SILK 2/0 TIES 18IN A185H

## (undated) DEVICE — DRSNG TELFA PAD NONADH STR 1S 3X8IN

## (undated) DEVICE — GLV SURG SENSICARE PI MIC PF SZ7.5 LF STRL

## (undated) DEVICE — DRAINBAG,ANTI-REFLUX TOWER,L/F,2000ML,LL: Brand: MEDLINE

## (undated) DEVICE — NDL HYPO ECLPS SFTY 18G 1 1/2IN

## (undated) DEVICE — SNAP KOVER: Brand: UNBRANDED

## (undated) DEVICE — ENCORE® LATEX MICRO SIZE 7.5, STERILE LATEX POWDER-FREE SURGICAL GLOVE: Brand: ENCORE

## (undated) DEVICE — PK VASC 10

## (undated) DEVICE — SPNG VERSALON 4X4 4PLY NONSTRL LF BG/200

## (undated) DEVICE — CONN STD FOR/O2 TBG

## (undated) DEVICE — THE BITE BLOCK MAXI, LATEX FREE STRAP IS USED TO PROTECT THE ENDOSCOPE INSERTION TUBE FROM BEING BITTEN BY THE PATIENT.

## (undated) DEVICE — HYBRID CO2 TUBING/CAP SET FOR OLYMPUS® SCOPES & CO2 SOURCE: Brand: ERBE

## (undated) DEVICE — SOL IRR H2O BTL 1000ML STRL

## (undated) DEVICE — ADHS LIQ MASTISOL 2/3ML

## (undated) DEVICE — Device

## (undated) DEVICE — PICO 7 10CM X 30CM: Brand: PICO™ 7

## (undated) DEVICE — SPONGE LAP W18XL18IN WHT COT 4 PLY FLD STRUNG RADPQ DISP ST

## (undated) DEVICE — SUTURE PERMAHAND SZ 2-0 L30IN NONABSORBABLE BLK SILK W/O A305H

## (undated) DEVICE — LUBE GEL ENDOGLIDE 1.1OZ

## (undated) DEVICE — SPNG GZ WOVN 4X4IN 12PLY 10/BX STRL

## (undated) DEVICE — INTENDED FOR TISSUE SEPARATION, AND OTHER PROCEDURES THAT REQUIRE A SHARP SURGICAL BLADE TO PUNCTURE OR CUT.: Brand: BARD-PARKER ® STAINLESS STEEL BLADES

## (undated) DEVICE — SKIN AFFIX SURG ADHESIVE 72/CS 0.55ML: Brand: MEDLINE

## (undated) DEVICE — ST EXT MICROBORE FIX M LL 38IN

## (undated) DEVICE — SOL LR 1000ML

## (undated) DEVICE — HDRST POSTIN FM CRDL TRACH SLOT 9X8X4IN

## (undated) DEVICE — SHEET,DRAPE,53X77,STERILE: Brand: MEDLINE

## (undated) DEVICE — SUT SILK 3/0 SH CR8 18IN C013D

## (undated) DEVICE — TP CLTH MEDIPORE SFT 1IN 10YD

## (undated) DEVICE — STCKNT STRL 4X48 IN

## (undated) DEVICE — SUT SILK 3/0 TIES 18IN A184H

## (undated) DEVICE — MERCY FAIRFIELD TURNOVER KIT: Brand: MEDLINE INDUSTRIES, INC.

## (undated) DEVICE — PROXIMATE RH ROTATING HEAD SKIN STAPLERS (35 WIDE) CONTAINS 35 STAINLESS STEEL STAPLES: Brand: PROXIMATE

## (undated) DEVICE — AIRWY 90MM NO9

## (undated) DEVICE — 3M™ IOBAN™ 2 ANTIMICROBIAL INCISE DRAPE 6650EZ: Brand: IOBAN™ 2

## (undated) DEVICE — CANNULA,ADULT,SOFT-TOUCH,7'TUBE,UC: Brand: PENDING

## (undated) DEVICE — PK MINOR SPLT 10

## (undated) DEVICE — DECANT BG O JET

## (undated) DEVICE — SYR LUERLOK 50ML

## (undated) DEVICE — TOTAL TRAY, DB, 100% SILI FOLEY, 16FR 10: Brand: MEDLINE

## (undated) DEVICE — TUBING, SUCTION, 1/4" X 10', STRAIGHT: Brand: MEDLINE

## (undated) DEVICE — INTENDED TO BE USED TO OCCLUDE, RETRACT AND IDENTIFY ARTERIES, VEINS, TENDONS AND NERVES IN SURGICAL PROCEDURES: Brand: STERION®  VESSEL LOOP

## (undated) DEVICE — CVR HNDL LIGHT RIGID

## (undated) DEVICE — SYR LL 3CC

## (undated) DEVICE — SYR CONTRL LUERLOK 10CC

## (undated) DEVICE — KT CATH TAL PALINDROME RUBY 14.5F23CM

## (undated) DEVICE — GLV SURG PREMIERPRO MIC LTX PF SZ8 BRN

## (undated) DEVICE — SUTURE VCRL + SZ 3-0 L18IN ABSRB UD SH 1/2 CIR TAPERCUT NDL VCP864D

## (undated) DEVICE — STAPLER INT L28CM 60MM 12 FIRING B FRM PWD + ECHELON FLX

## (undated) DEVICE — DRAPE,HAND,STERILE: Brand: MEDLINE

## (undated) DEVICE — SUT SILK 2/0 SH 30IN K833H

## (undated) DEVICE — SENSR O2 OXIMAX FNGR A/ 18IN NONSTR

## (undated) DEVICE — CONTN GRAD MEAS TRIANG 32OZ BLK

## (undated) DEVICE — PAD HEMOST NEPTUNE 2X2IN

## (undated) DEVICE — MEDI-VAC YANKAUER SUCTION HANDLE W/BULBOUS TIP: Brand: CARDINAL HEALTH

## (undated) DEVICE — CATH IV INSYTE AUTOGARD SHLD 24G .56IN

## (undated) DEVICE — GLV SURG SENSICARE PI MIC PF SZ7 LF STRL

## (undated) DEVICE — GOWN SIRUS NONREIN LG W/TWL: Brand: MEDLINE INDUSTRIES, INC.

## (undated) DEVICE — COVER LT HNDL BLU PLAS

## (undated) DEVICE — GLV SURG PREMIERPRO MIC LTX PF SZ7.5 BRN

## (undated) DEVICE — SUT MNCRYL PLS ANTIB UD 4/0 PC3 18IN

## (undated) DEVICE — ENCORE® LATEX MICRO SIZE 8, STERILE LATEX POWDER-FREE SURGICAL GLOVE: Brand: ENCORE

## (undated) DEVICE — ALLEVYN TRACHEOSTOMY 3.5X3.5" CTN 10: Brand: ALLEVYN TRACHEOSTOMY

## (undated) DEVICE — CATH IV ANGIOCATH/AUTOGARD 22G 1IN BLU

## (undated) DEVICE — BANDAGE,GAUZE,BULKEE II,4.5"X4.1YD,STRL: Brand: MEDLINE

## (undated) DEVICE — "MH-438 A/W VLVE F/140 EVIS-140": Brand: AIR/WATER VALVE

## (undated) DEVICE — BIPOLAR ELECTROHEMOSTASIS CATHETER: Brand: GOLD PROBE 350CM

## (undated) DEVICE — DRSNG SURESITE WNDW 2.38X2.75

## (undated) DEVICE — TUBING,OXYGEN,CRUSH RES,7',CLEAR,UC: Brand: MEDLINE INDUSTRIES, INC.

## (undated) DEVICE — ANTIBACTERIAL UNDYED BRAIDED (POLYGLACTIN 910), SYNTHETIC ABSORBABLE SURGICAL SUTURE: Brand: COATED VICRYL

## (undated) DEVICE — 3M™ STERI-STRIP™ REINFORCED ADHESIVE SKIN CLOSURES, R1547, 1/2 IN X 4 IN (12 MM X 100 MM), 6 STRIPS/ENVELOPE: Brand: 3M™ STERI-STRIP™

## (undated) DEVICE — GOWN,PREVENTION PLUS,XXLARGE,STERILE: Brand: MEDLINE

## (undated) DEVICE — CANN NASL CO2 DIVIDED A/

## (undated) DEVICE — APPLICATOR MEDICATED 26 CC SOLUTION HI LT ORNG CHLORAPREP

## (undated) DEVICE — 3M™ TEGADERM™ IV ADVANCED SECUREMENT DRESSING 4 INCHES X 4 3/4 INCHES 50 DRESSINGS/CARTON 4 CARTONS/CASE 1688: Brand: 3M™ TEGADERM™

## (undated) DEVICE — PERCUTANEOUS ENDOSCOPIC GASTROSTOMY KIT: Brand: ENDOVIVE SAFETY PEG KIT

## (undated) DEVICE — GLOVE SURG SZ 6.5 L11.2IN FNGR THK9.8MIL STRW LTX POLYMER

## (undated) DEVICE — CATH IV ANGIOCATH/AUTOGARD 18G 1.25IN GR

## (undated) DEVICE — SPNG GZ STRL 2S 4X4 12PLY

## (undated) DEVICE — APPL CHLORAPREP TINTED 26ML TEAL

## (undated) DEVICE — SPNG LAP PREWSH SFTPK 18X18IN STRL PK/5

## (undated) DEVICE — SINGLE-USE BIOPSY FORCEPS: Brand: RADIAL JAW 4

## (undated) DEVICE — SUT MNCRYL PLS ANTIB UD 4/0 PS2 18IN

## (undated) DEVICE — DRESSING,OPTIFOAM,NON-ADHESIVE,4X4: Brand: MEDLINE

## (undated) DEVICE — SHT AIR TRANSFR COMFRT GLIDE LT LAT 40X80IN

## (undated) DEVICE — Device: Brand: ENDOGATOR

## (undated) DEVICE — SUT PROLN 2/0 PC3 8833H

## (undated) DEVICE — SOL NACL 0.9PCT 1000ML

## (undated) DEVICE — SOLUTION IRRIG 1000ML 0.9% SOD CHL USP POUR PLAS BTL

## (undated) DEVICE — DRAPE,LAP,CHOLE,W/TROUGHS,STERILE: Brand: MEDLINE

## (undated) DEVICE — BLADE ES ELASTOMERIC COAT INSUL DURABLE BEND UPTO 90DEG

## (undated) DEVICE — 3M™ TEGADERM™ CHG DRESSING 25/CARTON 4 CARTONS/CASE 1658: Brand: TEGADERM™

## (undated) DEVICE — TRY SKINPREP DRYPREP

## (undated) DEVICE — SYR LL TP 10ML STRL

## (undated) DEVICE — ELECTRODE,ECG,FOAM, 3/PK: Brand: MEDLINE

## (undated) DEVICE — SYR LUERLOK 20CC

## (undated) DEVICE — SUT PROLN 6/0 BV1 D/A 30IN 8709H

## (undated) DEVICE — TBG 02 CRUSH RESIST LF CLR 7FT

## (undated) DEVICE — "MH-443 SUCTION VALVE F/EVIS140 EVIS160": Brand: SUCTION VALVE

## (undated) DEVICE — CATH IV ANGIOCATH/AUTOGARD 20G 1IN PNK

## (undated) DEVICE — SUTURE VCRL + SZ 0 L18IN ABSRB CLR VCP112G

## (undated) DEVICE — ENDOGATOR HYBRID TUBING KIT FOR USE WITH ENDOGATOR IRRIGATION PUMP, OLYMPUS PUMP, GI4000 ESU, AND TORRENT IRRIGATION PUMP.: Brand: ENDOGATOR KIT

## (undated) DEVICE — TOWEL,OR,DSP,ST,BLUE,STD,4/PK,20PK/CS: Brand: MEDLINE

## (undated) DEVICE — MAJOR SET UP PK

## (undated) DEVICE — "MH-948 A/W CHANNEL CLEANING ADPTR -VIDEO": Brand: AW CHANNEL CLEANING ADAPTE

## (undated) DEVICE — SUTURE ABSORBABLE MONOFILAMENT 0 CTX 60 IN VIO PDS + PDP990G

## (undated) DEVICE — BINDER ABD UNISX 12IN 85IN 3XL UNIV

## (undated) DEVICE — GAUZE,SPONGE,4"X4",8PLY,STRL,LF,10/TRAY: Brand: MEDLINE

## (undated) DEVICE — SUCTION CANISTER, 1000CC,SAFELINER: Brand: DEROYAL

## (undated) DEVICE — ST INF 10DRP 4 PORT 4WY/STPCOCK 112IN